# Patient Record
Sex: MALE | Race: WHITE | Employment: OTHER | URBAN - METROPOLITAN AREA
[De-identification: names, ages, dates, MRNs, and addresses within clinical notes are randomized per-mention and may not be internally consistent; named-entity substitution may affect disease eponyms.]

---

## 2017-01-18 ENCOUNTER — HOSPITAL ENCOUNTER (INPATIENT)
Age: 67
LOS: 1 days | Discharge: HOME | DRG: 287 | End: 2017-01-20
Attending: EMERGENCY MEDICINE | Admitting: INTERNAL MEDICINE
Payer: SELF-PAY

## 2017-01-18 ENCOUNTER — APPOINTMENT (OUTPATIENT)
Dept: GENERAL RADIOLOGY | Age: 67
DRG: 287 | End: 2017-01-18
Attending: EMERGENCY MEDICINE
Payer: SELF-PAY

## 2017-01-18 DIAGNOSIS — I50.9 CONGESTIVE HEART FAILURE, UNSPECIFIED CONGESTIVE HEART FAILURE CHRONICITY, UNSPECIFIED CONGESTIVE HEART FAILURE TYPE: Primary | ICD-10-CM

## 2017-01-18 LAB
NRBC # BLD: 0 K/UL (ref 0–0.01)
NRBC BLD-RTO: 0 PER 100 WBC

## 2017-01-18 PROCEDURE — 36415 COLL VENOUS BLD VENIPUNCTURE: CPT | Performed by: EMERGENCY MEDICINE

## 2017-01-18 PROCEDURE — 82550 ASSAY OF CK (CPK): CPT | Performed by: EMERGENCY MEDICINE

## 2017-01-18 PROCEDURE — 83880 ASSAY OF NATRIURETIC PEPTIDE: CPT | Performed by: EMERGENCY MEDICINE

## 2017-01-18 PROCEDURE — 80053 COMPREHEN METABOLIC PANEL: CPT | Performed by: EMERGENCY MEDICINE

## 2017-01-18 PROCEDURE — 51798 US URINE CAPACITY MEASURE: CPT

## 2017-01-18 PROCEDURE — 83036 HEMOGLOBIN GLYCOSYLATED A1C: CPT | Performed by: INTERNAL MEDICINE

## 2017-01-18 PROCEDURE — 93005 ELECTROCARDIOGRAM TRACING: CPT

## 2017-01-18 PROCEDURE — 83735 ASSAY OF MAGNESIUM: CPT | Performed by: EMERGENCY MEDICINE

## 2017-01-18 PROCEDURE — 84443 ASSAY THYROID STIM HORMONE: CPT | Performed by: INTERNAL MEDICINE

## 2017-01-18 PROCEDURE — 71020 XR CHEST PA LAT: CPT

## 2017-01-18 PROCEDURE — 99285 EMERGENCY DEPT VISIT HI MDM: CPT

## 2017-01-18 PROCEDURE — 85025 COMPLETE CBC W/AUTO DIFF WBC: CPT | Performed by: EMERGENCY MEDICINE

## 2017-01-18 NOTE — IP AVS SNAPSHOT
[address] [phone] Patient: Cassandra Robb. MRN: [id number] PVA:[id number] You are allergic to the following No active allergies Recent Documentation Height Weight BMI  
  
  
 1.765 m 81 kg 25.99 kg/m2 Emergency Contacts Name Discharge Info Relation Home Work Mobile Reji Members     [phone] About your hospitalization You were admitted on:  January 19, 2017 You last received care in the:  [facility] 3 PROG CARE TELE 1 You were discharged on:  January 20, 2017 Unit phone number:  [phone] Why you were hospitalized Your primary diagnosis was:  Acute Systolic (Congestive) Heart Failure (Hcc) Your diagnoses also included:  Bilateral Lower Extremity Edema, Shortness Of Breath, Acute Renal Failure (Arf) (Hcc), Hyperglycemia Due To Type 2 Diabetes Mellitus (Hcc), Venous Stasis Dermatitis Of Both Lower Extremities, Hypoxia, Pulmonary Edema, Sinus Tachycardia, Ischemic Cardiomyopathy Providers Seen During Your Hospitalizations Provider Role Specialty Primary office phone Maggy Faith MD Attending Provider Emergency Medicine [phone] Naveed Fall DO Attending Provider Emergency Medicine [phone] Dinesh Oswald DO Attending Provider Internal Medicine [phone] Patti Dallas MD Attending Provider Internal Medicine [phone] Your Primary Care Physician (PCP) Primary Care Physician Office Phone Office Fax NONE ** None ** ** None ** Follow-up Information Follow up With Details Comments Contact Info Shanique Estrada NP On 1/27/2017 9:30am [address] [facility] [address] [place] [address] Road [address] [phone] None   None ([phone]) Patient stated that they have no PCP Your Appointments  Friday January 27, 2017  9:30 AM EST  
ESTABLISHED PATIENT with Shanique Estrada NP  
 [facility] ([facility] SCHEDULING) [address] [id number] [facility]  
[phone] Current Discharge Medication List  
  
STOP taking these medications ADVIL 200 mg tablet Generic drug:  ibuprofen  
   
  
 hydroCHLOROthiazide 25 mg tablet Commonly known as:  HYDRODIURIL Discharge Instructions Learning About Coronary Artery Disease (CAD) What is coronary artery disease? Coronary artery disease (CAD) occurs when plaque builds up in the arteries that bring oxygen-rich blood to your heart. Plaque is a fatty substance made of cholesterol, calcium, and other substances in the blood. This process is called hardening of the arteries, or atherosclerosis. What happens when you have coronary artery disease? · Plaque may narrow the coronary arteries. Narrowed arteries cause poor blood flow. This can lead to angina symptoms such as chest pain or discomfort. If blood flow is completely blocked, you could have a heart attack. · You can slow CAD and reduce the risk of future problems by making changes in your lifestyle. These include quitting smoking and eating heart-healthy foods. · Treatments for CAD, along with changes in your lifestyle, can help you live a longer and healthier life. How can you prevent coronary artery disease? · Do not smoke. It may be the best thing you can do to prevent heart disease. If you need help quitting, talk to your doctor about stop-smoking programs and medicines. These can increase your chances of quitting for good. · Be active. Get at least 30 minutes of exercise on most days of the week. Walking is a good choice. You also may want to do other activities, such as running, swimming, cycling, or playing tennis or team sports. · Eat heart-healthy foods. Eat more fruits and vegetables and less foods that contain saturated and trans fats. Limit alcohol, sodium, and sweets. · Stay at a healthy weight. Lose weight if you need to. · Manage other health problems such as diabetes, high blood pressure, and high cholesterol. · Manage stress. Stress can hurt your heart. To keep stress low, talk about your problems and feelings. Don't keep your feelings hidden. · If you have talked about it with your doctor, take a low-dose aspirin every day. Aspirin can help certain people lower their risk of a heart attack or stroke. But taking aspirin isn't right for everyone, because it can cause serious bleeding. Do not start taking daily aspirin unless your doctor knows about it. How is coronary artery disease treated? · Your doctor will suggest that you make lifestyle changes. For example, your doctor may ask you to eat healthy foods, quit smoking, lose extra weight, and be more active. · You will have to take medicines. · Your doctor may suggest a procedure to open narrowed or blocked arteries. This is called angioplasty. Or your doctor may suggest using healthy blood vessels to create detours around narrowed or blocked arteries. This is called bypass surgery. Follow-up care is a key part of your treatment and safety. Be sure to make and go to all appointments, and call your doctor if you are having problems. It's also a good idea to know your test results and keep a list of the medicines you take. Where can you learn more? Go to http://erik-dianna.info/. Enter [phone] 6928 [id number] in the search box to learn more about \"Learning About Coronary Artery Disease (CAD). \" Current as of: January 27, 2016 Content Version: 11.1 © 9820-2808 [employer]. Care instructions adapted under license by [employer] (which disclaims liability or warranty for this information). If you have questions about a medical condition or this instruction, always ask your healthcare professional. [address] any warranty or liability for your use of this information. Avoiding Triggers With Heart Failure: Care Instructions Your Care Instructions Triggers are anything that make your heart failure flare up. A flare-up is also called \"sudden heart failure\" or \"acute heart failure. \" When you have a flare-up, fluid builds up in your lungs, and you have problems breathing. You might need to go to the hospital. By watching for changes in your condition and avoiding triggers, you can prevent heart failure flare-ups. Follow-up care is a key part of your treatment and safety. Be sure to make and go to all appointments, and call your doctor if you are having problems. It's also a good idea to know your test results and keep a list of the medicines you take. How can you care for yourself at home? Watch for changes in your weight and condition · Weigh yourself without clothing at the same time each day. Record your weight. Call your doctor if you gain 3 pounds or more in 2 to 3 days. A sudden weight gain may mean that your heart failure is getting worse. · Keep a daily record of your symptoms. Write down any changes in how you feel, such as new shortness of breath, cough, or problems eating. Also record if your ankles are more swollen than usual and if you have to urinate in the night more often. Note anything that you ate or did that could have triggered these changes. Limit sodium Sodium causes your body to hold on to water, making it harder for your heart to pump. People get most of their sodium from processed foods. Fast food and restaurant meals also tend to be very high in sodium. · Your doctor may suggest that you limit sodium to 2,000 milligrams (mg) a day or less. That is less than 1 teaspoon of salt a day, including all the salt you eat in cooking or in packaged foods. · Read food labels on cans and food packages. They tell you how much sodium you get in one serving. Check the serving size. If you eat more than one serving, you are getting more sodium. · Be aware that sodium can come in forms other than salt, including monosodium glutamate (MSG), sodium citrate, and sodium bicarbonate (baking soda). MSG is often added to Asian food. You can sometimes ask for food without MSG or salt. · Slowly reducing salt will help you adjust to the taste. Take the salt shaker off the table. · Flavor your food with garlic, lemon juice, onion, vinegar, herbs, and spices instead of salt. Do not use soy sauce, steak sauce, onion salt, garlic salt, mustard, or ketchup on your food, unless it is labeled \"low-sodium\" or \"low-salt. \" 
· Make your own salad dressings, sauces, and ketchup without adding salt. · Use fresh or frozen ingredients, instead of canned ones, whenever you can. Choose low-sodium canned goods. · Eat less processed food and food from restaurants, including fast food. Exercise as directed Moderate, regular exercise is very good for your heart. It improves your blood flow and helps control your weight. But too much exercise can stress your heart and cause a heart failure flare-up. · Check with your doctor before you start an exercise program. 
· Walking is an easy way to get exercise. Start out slowly. Gradually increase the length and pace of your walk. Swimming, riding a bike, and using a treadmill are also good forms of exercise. · When you exercise, watch for signs that your heart is working too hard. You are pushing yourself too hard if you cannot talk while you are exercising. If you become short of breath or dizzy or have chest pain, stop, sit down, and rest. 
· Do not exercise when you do not feel well. Take medicines correctly · Take your medicines exactly as prescribed. Call your doctor if you think you are having a problem with your medicine. · Make a list of all the medicines you take.  Include those prescribed to you by other doctors and any over-the-counter medicines, vitamins, or supplements you take. Take this list with you when you go to any doctor. · Take your medicines at the same time every day. It may help you to post a list of all the medicines you take every day and what time of day you take them. · Make taking your medicine as simple as you can. Plan times to take your medicines when you are doing other things, such as eating a meal or getting ready for bed. This will make it easier to remember to take your medicines. · Get organized. Use helpful tools, such as daily or weekly pill containers. When should you call for help? Call 911 if you have symptoms of sudden heart failure such as: 
· You have severe trouble breathing. · You cough up pink, foamy mucus. · You have a new irregular or rapid heartbeat. Call your doctor now or seek immediate medical care if: 
· You have new or increased shortness of breath. · You are dizzy or lightheaded, or you feel like you may faint. · You have sudden weight gain, such as 3 pounds or more in 2 to 3 days. · You have increased swelling in your legs, ankles, or feet. · You are suddenly so tired or weak that you cannot do your usual activities. Watch closely for changes in your health, and be sure to contact your doctor if you develop new symptoms. Where can you learn more? Go to http://erik-dianna.info/. Enter M075 in the search box to learn more about \"Avoiding Triggers With Heart Failure: Care Instructions. \" Current as of: April 27, 2016 Content Version: 11.1 © 8331-4388 [employer]. Care instructions adapted under license by [employer] (which disclaims liability or warranty for this information). If you have questions about a medical condition or this instruction, always ask your healthcare professional. [place] [address] any warranty or liability for your use of this information.  
Cardiology Follow up with Davida Henriquez NP on January 27th, 2017 at 9:30am. 
 [address] [facility] [address] 
[phone] Cardiac Catheterization/Angiography Discharge Instructions *Check the puncture site frequently for swelling or bleeding. If you see any bleeding, lie down and apply pressure over the area with a clean town or washcloth. Notify your doctor for any redness, swelling, drainage or oozing from the puncture site. Notify your doctor for any fever or chills. *If the leg or arm with the puncture becomes cold, numb or painful, call Dr Sania Pabon at  [facility] in [place] *Activity should be limited for the next 48 hours. Climb stairs as little as possible and avoid any stooping, bending or strenuous activity for 48 hours. No heavy lifting (anything over 10 pounds) for three days. *Do not drive for 48 hours. *You may resume your usual diet. Drink more fluids than usual. 
 
*Have a responsible person drive you home and stay with you for at least 24 hours after your heart catheterization/angiography. *You may remove the bandage from your {ARM/GROIN:74024} in 24 hours. You may shower in 24 hours. No tub baths, hot tubs or swimming for one week. Do not place any lotions, creams, powders, ointments over the puncture site for one week. You may place a clean band-aid over the puncture site each day for 5 days. Change this daily. Discharge Orders None [employer] Announcement We are excited to announce that we are making your provider's discharge notes available to you in AdYouNet. You will see these notes when they are completed and signed by the physician that discharged you from your recent hospital stay. If you have any questions or concerns about any information you see in AdYouNet, please call the Health Information Department where you were seen or reach out to your Primary Care Provider for more information about your plan of care. Introducing [facility]! Sindy Starks introduces MightyText patient portal. Now you can access parts of your medical record, email your doctor's office, and request medication refills online. 1. In your internet browser, go to [email or web address] 2. Click on the First Time User? Click Here link in the Sign In box. You will see the New Member Sign Up page. 3. Enter your MightyText Access Code exactly as it appears below. You will not need to use this code after youve completed the sign-up process. If you do not sign up before the expiration date, you must request a new code. · MightyText Access Code: [email or web address] Expires: 4/18/2017 10:53 PM 
 
4. Enter the last four digits of your Social Security Number (xxxx) and Date of Birth (mm/dd/yyyy) as indicated and click Submit. You will be taken to the next sign-up page. 5. Create a MightyText ID. This will be your MightyText login ID and cannot be changed, so think of one that is secure and easy to remember. 6. Create a MightyText password. You can change your password at any time. 7. Enter your Password Reset Question and Answer. This can be used at a later time if you forget your password. 8. Enter your e-mail address. You will receive e-mail notification when new information is available in [address] 9. Click Sign Up. You can now view and download portions of your medical record. 10. Click the Download Summary menu link to download a portable copy of your medical information. If you have questions, please visit the Frequently Asked Questions section of the MightyText website. Remember, MightyText is NOT to be used for urgent needs. For medical emergencies, dial 911. Now available from your iPhone and Android! General Information Please provide this summary of care documentation to your next provider. Patient Signature:  ____________________________________________________________ Date:  ____________________________________________________________  
  
Ashlie Audrey Provider Signature:  ____________________________________________________________ Date:  ____________________________________________________________

## 2017-01-19 ENCOUNTER — APPOINTMENT (OUTPATIENT)
Dept: ULTRASOUND IMAGING | Age: 67
DRG: 287 | End: 2017-01-19
Attending: INTERNAL MEDICINE
Payer: SELF-PAY

## 2017-01-19 ENCOUNTER — APPOINTMENT (OUTPATIENT)
Dept: CT IMAGING | Age: 67
DRG: 287 | End: 2017-01-19
Attending: INTERNAL MEDICINE
Payer: SELF-PAY

## 2017-01-19 PROBLEM — N17.9 ACUTE RENAL FAILURE (ARF) (HCC): Status: ACTIVE | Noted: 2017-01-19

## 2017-01-19 PROBLEM — J81.1 PULMONARY EDEMA: Status: ACTIVE | Noted: 2017-01-19

## 2017-01-19 PROBLEM — R00.0 SINUS TACHYCARDIA: Status: ACTIVE | Noted: 2017-01-19

## 2017-01-19 PROBLEM — I87.2 VENOUS STASIS DERMATITIS OF BOTH LOWER EXTREMITIES: Status: ACTIVE | Noted: 2017-01-19

## 2017-01-19 PROBLEM — R09.02 HYPOXIA: Status: ACTIVE | Noted: 2017-01-19

## 2017-01-19 PROBLEM — R60.0 BILATERAL LOWER EXTREMITY EDEMA: Status: ACTIVE | Noted: 2017-01-19

## 2017-01-19 PROBLEM — E11.65 HYPERGLYCEMIA DUE TO TYPE 2 DIABETES MELLITUS (HCC): Status: ACTIVE | Noted: 2017-01-19

## 2017-01-19 PROBLEM — I50.21 ACUTE SYSTOLIC (CONGESTIVE) HEART FAILURE (HCC): Status: ACTIVE | Noted: 2017-01-19

## 2017-01-19 PROBLEM — R06.02 SHORTNESS OF BREATH: Status: ACTIVE | Noted: 2017-01-19

## 2017-01-19 PROBLEM — I50.9 CONGESTIVE HEART FAILURE (HCC): Status: ACTIVE | Noted: 2017-01-19

## 2017-01-19 LAB
ALBUMIN SERPL BCP-MCNC: 2.9 G/DL (ref 3.5–5)
ALBUMIN/GLOB SERPL: 0.7 {RATIO} (ref 1.1–2.2)
ALP SERPL-CCNC: 83 U/L (ref 45–117)
ALT SERPL-CCNC: 13 U/L (ref 12–78)
ANION GAP BLD CALC-SCNC: 11 MMOL/L (ref 5–15)
APPEARANCE UR: CLEAR
AST SERPL W P-5'-P-CCNC: 10 U/L (ref 15–37)
ATRIAL RATE: 108 BPM
BACTERIA URNS QL MICRO: ABNORMAL /HPF
BASOPHILS # BLD AUTO: 0 K/UL (ref 0–0.1)
BASOPHILS # BLD: 0 % (ref 0–1)
BILIRUB SERPL-MCNC: 0.9 MG/DL (ref 0.2–1)
BILIRUB UR QL CFM: NEGATIVE
BNP SERPL-MCNC: 9250 PG/ML (ref 0–125)
BUN SERPL-MCNC: 40 MG/DL (ref 6–20)
BUN/CREAT SERPL: 17 (ref 12–20)
CALCIUM SERPL-MCNC: 8.8 MG/DL (ref 8.5–10.1)
CALCULATED P AXIS, ECG09: 56 DEGREES
CALCULATED R AXIS, ECG10: 23 DEGREES
CALCULATED T AXIS, ECG11: 124 DEGREES
CHLORIDE SERPL-SCNC: 90 MMOL/L (ref 97–108)
CHOLEST SERPL-MCNC: 120 MG/DL
CK MB CFR SERPL CALC: 5.7 % (ref 0–2.5)
CK MB SERPL-MCNC: 2.9 NG/ML (ref 5–25)
CK SERPL-CCNC: 51 U/L (ref 39–308)
CO2 SERPL-SCNC: 28 MMOL/L (ref 21–32)
COLOR UR: ABNORMAL
CREAT SERPL-MCNC: 2.33 MG/DL (ref 0.7–1.3)
CREAT UR-MCNC: 190.36 MG/DL
DIAGNOSIS, 93000: NORMAL
DIFFERENTIAL METHOD BLD: ABNORMAL
EOSINOPHIL # BLD: 0.1 K/UL (ref 0–0.4)
EOSINOPHIL NFR BLD: 1 % (ref 0–7)
EPITH CASTS URNS QL MICRO: ABNORMAL /LPF
ERYTHROCYTE [DISTWIDTH] IN BLOOD BY AUTOMATED COUNT: 15.7 % (ref 11.5–14.5)
EST. AVERAGE GLUCOSE BLD GHB EST-MCNC: 338 MG/DL
EST. AVERAGE GLUCOSE BLD GHB EST-MCNC: 341 MG/DL
GLOBULIN SER CALC-MCNC: 4 G/DL (ref 2–4)
GLUCOSE BLD STRIP.AUTO-MCNC: 127 MG/DL (ref 65–100)
GLUCOSE BLD STRIP.AUTO-MCNC: 182 MG/DL (ref 65–100)
GLUCOSE BLD STRIP.AUTO-MCNC: 200 MG/DL (ref 65–100)
GLUCOSE BLD STRIP.AUTO-MCNC: 423 MG/DL (ref 65–100)
GLUCOSE SERPL-MCNC: 491 MG/DL (ref 65–100)
GLUCOSE UR STRIP.AUTO-MCNC: >1000 MG/DL
HBA1C MFR BLD: 13.4 % (ref 4.2–6.3)
HBA1C MFR BLD: 13.5 % (ref 4.2–6.3)
HCT VFR BLD AUTO: 40.8 % (ref 36.6–50.3)
HDLC SERPL-MCNC: 19 MG/DL
HDLC SERPL: 6.3 {RATIO} (ref 0–5)
HGB BLD-MCNC: 13.6 G/DL (ref 12.1–17)
HGB UR QL STRIP: NEGATIVE
HYALINE CASTS URNS QL MICRO: ABNORMAL /LPF (ref 0–5)
KETONES UR QL STRIP.AUTO: NEGATIVE MG/DL
LDLC SERPL CALC-MCNC: 76 MG/DL (ref 0–100)
LEUKOCYTE ESTERASE UR QL STRIP.AUTO: NEGATIVE
LIPID PROFILE,FLP: ABNORMAL
LYMPHOCYTES # BLD AUTO: 7 % (ref 12–49)
LYMPHOCYTES # BLD: 0.6 K/UL (ref 0.8–3.5)
MAGNESIUM SERPL-MCNC: 1.6 MG/DL (ref 1.6–2.4)
MCH RBC QN AUTO: 25.6 PG (ref 26–34)
MCHC RBC AUTO-ENTMCNC: 33.3 G/DL (ref 30–36.5)
MCV RBC AUTO: 76.7 FL (ref 80–99)
MONOCYTES # BLD: 0.9 K/UL (ref 0–1)
MONOCYTES NFR BLD AUTO: 10 % (ref 5–13)
NEUTS SEG # BLD: 6.9 K/UL (ref 1.8–8)
NEUTS SEG NFR BLD AUTO: 82 % (ref 32–75)
NITRITE UR QL STRIP.AUTO: NEGATIVE
P-R INTERVAL, ECG05: 158 MS
PH UR STRIP: 5 [PH] (ref 5–8)
PLATELET # BLD AUTO: 254 K/UL (ref 150–400)
POTASSIUM SERPL-SCNC: 3.5 MMOL/L (ref 3.5–5.1)
PROT SERPL-MCNC: 6.9 G/DL (ref 6.4–8.2)
PROT UR STRIP-MCNC: 100 MG/DL
PROT UR-MCNC: 153 MG/DL (ref 0–11.9)
PROT/CREAT UR-RTO: 0.8
Q-T INTERVAL, ECG07: 374 MS
QRS DURATION, ECG06: 130 MS
QTC CALCULATION (BEZET), ECG08: 501 MS
RBC # BLD AUTO: 5.32 M/UL (ref 4.1–5.7)
RBC #/AREA URNS HPF: ABNORMAL /HPF (ref 0–5)
RBC MORPH BLD: ABNORMAL
SERVICE CMNT-IMP: ABNORMAL
SODIUM SERPL-SCNC: 129 MMOL/L (ref 136–145)
SODIUM UR-SCNC: 14 MMOL/L
SP GR UR REFRACTOMETRY: 1.03 (ref 1–1.03)
TRIGL SERPL-MCNC: 125 MG/DL (ref ?–150)
TROPONIN I SERPL-MCNC: 0.04 NG/ML
TROPONIN I SERPL-MCNC: <0.04 NG/ML
TROPONIN I SERPL-MCNC: <0.04 NG/ML
TSH SERPL DL<=0.05 MIU/L-ACNC: 1.49 UIU/ML (ref 0.36–3.74)
UA: UC IF INDICATED,UAUC: ABNORMAL
UROBILINOGEN UR QL STRIP.AUTO: 1 EU/DL (ref 0.2–1)
VENTRICULAR RATE, ECG03: 108 BPM
VLDLC SERPL CALC-MCNC: 25 MG/DL
WBC # BLD AUTO: 8.5 K/UL (ref 4.1–11.1)
WBC URNS QL MICRO: ABNORMAL /HPF (ref 0–4)

## 2017-01-19 PROCEDURE — 80061 LIPID PANEL: CPT | Performed by: INTERNAL MEDICINE

## 2017-01-19 PROCEDURE — 81001 URINALYSIS AUTO W/SCOPE: CPT | Performed by: EMERGENCY MEDICINE

## 2017-01-19 PROCEDURE — 74011250637 HC RX REV CODE- 250/637: Performed by: INTERNAL MEDICINE

## 2017-01-19 PROCEDURE — 74011250636 HC RX REV CODE- 250/636: Performed by: INTERNAL MEDICINE

## 2017-01-19 PROCEDURE — 74011250636 HC RX REV CODE- 250/636: Performed by: EMERGENCY MEDICINE

## 2017-01-19 PROCEDURE — 84156 ASSAY OF PROTEIN URINE: CPT | Performed by: INTERNAL MEDICINE

## 2017-01-19 PROCEDURE — 74011636637 HC RX REV CODE- 636/637: Performed by: INTERNAL MEDICINE

## 2017-01-19 PROCEDURE — C8923 2D TTE W OR W/O FOL W/CON,CO: HCPCS

## 2017-01-19 PROCEDURE — 87086 URINE CULTURE/COLONY COUNT: CPT | Performed by: EMERGENCY MEDICINE

## 2017-01-19 PROCEDURE — 74011000250 HC RX REV CODE- 250: Performed by: INTERNAL MEDICINE

## 2017-01-19 PROCEDURE — 65660000000 HC RM CCU STEPDOWN

## 2017-01-19 PROCEDURE — 82962 GLUCOSE BLOOD TEST: CPT

## 2017-01-19 PROCEDURE — 74011250636 HC RX REV CODE- 250/636: Performed by: SPECIALIST

## 2017-01-19 PROCEDURE — 71250 CT THORAX DX C-: CPT

## 2017-01-19 PROCEDURE — 84484 ASSAY OF TROPONIN QUANT: CPT | Performed by: INTERNAL MEDICINE

## 2017-01-19 PROCEDURE — 76770 US EXAM ABDO BACK WALL COMP: CPT

## 2017-01-19 PROCEDURE — 83036 HEMOGLOBIN GLYCOSYLATED A1C: CPT | Performed by: INTERNAL MEDICINE

## 2017-01-19 PROCEDURE — 36415 COLL VENOUS BLD VENIPUNCTURE: CPT | Performed by: INTERNAL MEDICINE

## 2017-01-19 PROCEDURE — 84300 ASSAY OF URINE SODIUM: CPT | Performed by: INTERNAL MEDICINE

## 2017-01-19 RX ORDER — ONDANSETRON 2 MG/ML
6 INJECTION INTRAMUSCULAR; INTRAVENOUS
Status: DISCONTINUED | OUTPATIENT
Start: 2017-01-19 | End: 2017-01-20 | Stop reason: HOSPADM

## 2017-01-19 RX ORDER — BUMETANIDE 0.25 MG/ML
1 INJECTION INTRAMUSCULAR; INTRAVENOUS 2 TIMES DAILY
Status: DISCONTINUED | OUTPATIENT
Start: 2017-01-19 | End: 2017-01-20 | Stop reason: HOSPADM

## 2017-01-19 RX ORDER — IBUPROFEN 200 MG
400 TABLET ORAL 2 TIMES DAILY
COMMUNITY
End: 2017-01-20

## 2017-01-19 RX ORDER — TRAMADOL HYDROCHLORIDE 50 MG/1
50 TABLET ORAL
Status: DISCONTINUED | OUTPATIENT
Start: 2017-01-19 | End: 2017-01-20 | Stop reason: HOSPADM

## 2017-01-19 RX ORDER — HYDROMORPHONE HYDROCHLORIDE 1 MG/ML
1 INJECTION, SOLUTION INTRAMUSCULAR; INTRAVENOUS; SUBCUTANEOUS
Status: DISCONTINUED | OUTPATIENT
Start: 2017-01-19 | End: 2017-01-20 | Stop reason: HOSPADM

## 2017-01-19 RX ORDER — ACETAMINOPHEN 325 MG/1
650 TABLET ORAL
Status: DISCONTINUED | OUTPATIENT
Start: 2017-01-19 | End: 2017-01-20 | Stop reason: HOSPADM

## 2017-01-19 RX ORDER — SODIUM CHLORIDE 0.9 % (FLUSH) 0.9 %
5-10 SYRINGE (ML) INJECTION AS NEEDED
Status: CANCELLED | OUTPATIENT
Start: 2017-01-19

## 2017-01-19 RX ORDER — MAGNESIUM SULFATE 100 %
4 CRYSTALS MISCELLANEOUS AS NEEDED
Status: DISCONTINUED | OUTPATIENT
Start: 2017-01-19 | End: 2017-01-20 | Stop reason: HOSPADM

## 2017-01-19 RX ORDER — MORPHINE SULFATE 2 MG/ML
2 INJECTION, SOLUTION INTRAMUSCULAR; INTRAVENOUS
Status: DISCONTINUED | OUTPATIENT
Start: 2017-01-19 | End: 2017-01-19

## 2017-01-19 RX ORDER — HYDROCHLOROTHIAZIDE 25 MG/1
25 TABLET ORAL DAILY
COMMUNITY
End: 2017-01-20

## 2017-01-19 RX ORDER — HYDROMORPHONE HYDROCHLORIDE 1 MG/ML
2 INJECTION, SOLUTION INTRAMUSCULAR; INTRAVENOUS; SUBCUTANEOUS
Status: DISCONTINUED | OUTPATIENT
Start: 2017-01-19 | End: 2017-01-20 | Stop reason: HOSPADM

## 2017-01-19 RX ORDER — DIPHENHYDRAMINE HCL 25 MG
25 CAPSULE ORAL
Status: DISCONTINUED | OUTPATIENT
Start: 2017-01-19 | End: 2017-01-20 | Stop reason: HOSPADM

## 2017-01-19 RX ORDER — CARVEDILOL 3.12 MG/1
3.12 TABLET ORAL 2 TIMES DAILY WITH MEALS
Status: DISCONTINUED | OUTPATIENT
Start: 2017-01-19 | End: 2017-01-20 | Stop reason: HOSPADM

## 2017-01-19 RX ORDER — INSULIN LISPRO 100 [IU]/ML
INJECTION, SOLUTION INTRAVENOUS; SUBCUTANEOUS
Status: DISCONTINUED | OUTPATIENT
Start: 2017-01-19 | End: 2017-01-20 | Stop reason: HOSPADM

## 2017-01-19 RX ORDER — ASPIRIN 81 MG/1
81 TABLET ORAL DAILY
Status: DISCONTINUED | OUTPATIENT
Start: 2017-01-19 | End: 2017-01-20 | Stop reason: HOSPADM

## 2017-01-19 RX ORDER — IBUPROFEN 200 MG
1 TABLET ORAL DAILY
Status: DISCONTINUED | OUTPATIENT
Start: 2017-01-19 | End: 2017-01-20 | Stop reason: HOSPADM

## 2017-01-19 RX ORDER — SODIUM CHLORIDE 0.9 % (FLUSH) 0.9 %
5-10 SYRINGE (ML) INJECTION EVERY 8 HOURS
Status: CANCELLED | OUTPATIENT
Start: 2017-01-19

## 2017-01-19 RX ORDER — HYDROMORPHONE HYDROCHLORIDE 2 MG/ML
1-2 INJECTION, SOLUTION INTRAMUSCULAR; INTRAVENOUS; SUBCUTANEOUS
Status: DISCONTINUED | OUTPATIENT
Start: 2017-01-19 | End: 2017-01-19

## 2017-01-19 RX ORDER — DEXTROSE 50 % IN WATER (D50W) INTRAVENOUS SYRINGE
12.5-25 AS NEEDED
Status: DISCONTINUED | OUTPATIENT
Start: 2017-01-19 | End: 2017-01-20 | Stop reason: HOSPADM

## 2017-01-19 RX ORDER — FUROSEMIDE 10 MG/ML
80 INJECTION INTRAMUSCULAR; INTRAVENOUS
Status: COMPLETED | OUTPATIENT
Start: 2017-01-19 | End: 2017-01-19

## 2017-01-19 RX ORDER — INSULIN GLARGINE 100 [IU]/ML
10 INJECTION, SOLUTION SUBCUTANEOUS DAILY
Status: DISCONTINUED | OUTPATIENT
Start: 2017-01-19 | End: 2017-01-20 | Stop reason: HOSPADM

## 2017-01-19 RX ORDER — HEPARIN SODIUM 5000 [USP'U]/ML
5000 INJECTION, SOLUTION INTRAVENOUS; SUBCUTANEOUS EVERY 8 HOURS
Status: DISCONTINUED | OUTPATIENT
Start: 2017-01-19 | End: 2017-01-20 | Stop reason: HOSPADM

## 2017-01-19 RX ADMIN — HEPARIN SODIUM 5000 UNITS: 5000 INJECTION, SOLUTION INTRAVENOUS; SUBCUTANEOUS at 08:41

## 2017-01-19 RX ADMIN — BUMETANIDE 1 MG: 0.25 INJECTION, SOLUTION INTRAMUSCULAR; INTRAVENOUS at 09:53

## 2017-01-19 RX ADMIN — ASPIRIN 81 MG: 81 TABLET, COATED ORAL at 09:55

## 2017-01-19 RX ADMIN — PERFLUTREN 2 ML: 6.52 INJECTION, SUSPENSION INTRAVENOUS at 09:22

## 2017-01-19 RX ADMIN — TRAMADOL HYDROCHLORIDE 50 MG: 50 TABLET, FILM COATED ORAL at 18:56

## 2017-01-19 RX ADMIN — CARVEDILOL 3.12 MG: 3.12 TABLET, FILM COATED ORAL at 09:55

## 2017-01-19 RX ADMIN — Medication 2 MG: at 06:29

## 2017-01-19 RX ADMIN — FUROSEMIDE 80 MG: 10 INJECTION, SOLUTION INTRAMUSCULAR; INTRAVENOUS at 01:40

## 2017-01-19 RX ADMIN — INSULIN LISPRO 12 UNITS: 100 INJECTION, SOLUTION INTRAVENOUS; SUBCUTANEOUS at 08:40

## 2017-01-19 RX ADMIN — Medication 2 MG: at 09:49

## 2017-01-19 RX ADMIN — ACETAMINOPHEN 650 MG: 325 TABLET ORAL at 10:45

## 2017-01-19 RX ADMIN — HYDROMORPHONE HYDROCHLORIDE 1 MG: 1 INJECTION, SOLUTION INTRAMUSCULAR; INTRAVENOUS; SUBCUTANEOUS at 14:25

## 2017-01-19 RX ADMIN — INSULIN GLARGINE 10 UNITS: 100 INJECTION, SOLUTION SUBCUTANEOUS at 10:42

## 2017-01-19 RX ADMIN — CARVEDILOL 3.12 MG: 3.12 TABLET, FILM COATED ORAL at 18:56

## 2017-01-19 RX ADMIN — HYDROMORPHONE HYDROCHLORIDE 1 MG: 1 INJECTION, SOLUTION INTRAMUSCULAR; INTRAVENOUS; SUBCUTANEOUS at 10:59

## 2017-01-19 RX ADMIN — INSULIN LISPRO 2 UNITS: 100 INJECTION, SOLUTION INTRAVENOUS; SUBCUTANEOUS at 12:05

## 2017-01-19 RX ADMIN — HEPARIN SODIUM 5000 UNITS: 5000 INJECTION, SOLUTION INTRAVENOUS; SUBCUTANEOUS at 16:21

## 2017-01-19 NOTE — PROGRESS NOTES
Physical Therapy:  Orders received and chart reviewed. Patient currently with 10/10 pain, patient receiving additional IV pain medication from nurse. He declined out of bed or participation with PT/OT due to pain. Agreed to participate once his pain we better managed. We will continue to follow and re-attempt later as able.   Thank you  Nahed Oglesby PT,DPT

## 2017-01-19 NOTE — CARDIO/PULMONARY
Cardiac Rehab: 62 yo male admitted with SOB (1/19). Per Dr Rachid Carlson, dx includes: acute CHF, ARF & new DM2. S/P cath with severe 3VD, per Dr Familia Moore (1/20). LVEF 5-10% by echo (1/19/17). 1/19/2017 Received consult for CHF teaching on pt in ED; awaiting bed assignment. Also consult for smoking cessation counseling. Info attached to AVS on CHF & smoking cessation. 1/20/2017 Pt off floor for cath. Plan for CTS consult, per Dr Familia Moore.

## 2017-01-19 NOTE — ED NOTES
11:34 PM  Change of shift. Care of patient taken over from Dr. Lalo Melvin by Dr. Ruma العلي; H&P reviewed, handoff complete. Awaiting admission. 12:20 AM - d/w Dr Corina Peacock - he will see pt.

## 2017-01-19 NOTE — CONSULTS
[address] Miguel Willis. YOB: 1950     Assessment & Plan:   1. Elevated CR: CHACORTA Vs CKD  · No prior cr  · Last MD visit > 3 yrs ago  · Might be CKD due to daily NSAID use and DM  · US: No hydro, ok size kidneys  · UA: Glycosuria and urine prt/cr < 1 gm  · ACE if cr remains stable  · Needs CKD visit  · No NSAIDs  · DM control dw pt  2. Hyponatremia with hyperglycemia  · Corrected : mild hyponatremia  · Edema: so hypervolemic hyponatremia  · Monitor    3. DM  · Not managed and treated    4. Ex smoker    5. Edema with low albumin, Pleural effusion  · Urine prt/ct 0.8  · Use loops   · CHF likley: Echo          Subjective:   CHIEF COMPLAIN: edema  HPI:  Mr. Mal Lawler is a 61 y.o.  male with no significant medical history and general avoidance of the healthcare system who is admitted with new onset congestive heart failure, acute renal failure and  diabetes mellitus type II. He has not seen MD for > 3 Yrs. He was admitted at [address] [place] [address] but no records. He takes NSAIDs daily. He denies CKD. No FH CKD. He was told to have Hep C but ? They can not find it:? May be neg serologies. He presented to the Emergency Department today complaining of a few weeks of worsening lower extremity swelling. He reports that the last time he sought medical care was for leg swelling and SOB over 3 yrs ago at [address] [place] [address] but cannot recall anything. He reports worsening NAVARRO/SOB/PND/4 pillow orthopnea that has waxed and waned for those 3 years but progressively worsened over 3 weeks. Also notes sternal chest pressure, radiating to back and arms. Happens in AM. Nothing makes better or worse. Cannot be provoked. Also notes worsening LE edema with weeping. We will admit for further work-up and management. Review of Systems  A comprehensive review of systems was negative except for that written in the HPI. History reviewed. No pertinent past medical history. History reviewed. No pertinent past surgical history. Social History     Social History    Marital status:      Spouse name: N/A    Number of children: N/A    Years of education: N/A     Occupational History    Not on file. Social History Main Topics    Smoking status: Not on file    Smokeless tobacco: Not on file    Alcohol use Not on file    Drug use: Not on file    Sexual activity: Not on file     Other Topics Concern    Not on file     Social History Narrative    No narrative on file      No family history on file. Prior to Admission medications    Medication Sig Start Date End Date Taking? Authorizing Provider   ibuprofen (ADVIL) 200 mg tablet Take 400 mg by mouth two (2) times a day. Yes Historical Provider   hydroCHLOROthiazide (HYDRODIURIL) 25 mg tablet Take 25 mg by mouth daily. Yes Historical Provider     No Known Allergies    Objective:     Vitals:  Blood pressure 110/69, pulse (!) 105, temperature 98.3 °F (36.8 °C), resp. rate 19, height 5' 9.5\" (1.765 m), weight 77.1 kg (170 lb), SpO2 (!) 86 %.   Temp (24hrs), Av.1 °F (36.7 °C), Min:97.8 °F (36.6 °C), Max:98.3 °F (36.8 °C)      Intake and Output:   0701 -  1900  In: -   Out: 80 [Urine:410]       Physical Exam:                Patient is intubated:  no    Physical Examination:   GENERAL ASSESSMENT: cachetic  SKIN: SKIN LESION  HEAD: normocephalic  EYES: normal eyes  NECK: normal  CHEST: normal air exchange, no rales, no rhonchi, no wheezes, respiratory effort normal with no retractions  HEART: regular rate and rhythm, normal S1/S2  ABDOMEN: soft, non-distended, no masses  :Gordillo: no  EXTREMITY: no joint swelling, 2 EDEMA  NEURO: gross motor exam normal by observation      ECG/rhythm[de-identified] Rev:yes  Xray/CT/US/MRI REV:yes  Data Review   Recent Results (from the past 72 hour(s))   EKG, 12 LEAD, INITIAL    Collection Time: 17 11:07 PM   Result Value Ref Range    Ventricular Rate 108 BPM    Atrial Rate 108 BPM    P-R Interval 158 ms    QRS Duration 130 ms    Q-T Interval 374 ms    QTC Calculation (Bezet) 501 ms    Calculated P Axis 56 degrees    Calculated R Axis 23 degrees    Calculated T Axis 124 degrees    Diagnosis       Sinus tachycardia with occasional premature ventricular complexes  Possible Left atrial enlargement  Nonspecific intraventricular block  Abnormal ECG  Confirmed by Doc Aguilar MD ([id number]) on 1/19/2017 9:39:27 AM     CBC WITH AUTOMATED DIFF    Collection Time: 01/18/17 11:11 PM   Result Value Ref Range    WBC 8.5 4.1 - 11.1 K/uL    RBC 5.32 4.10 - 5.70 M/uL    HGB 13.6 12.1 - 17.0 g/dL    HCT 40.8 36.6 - 50.3 %    MCV 76.7 (L) 80.0 - 99.0 FL    MCH 25.6 (L) 26.0 - 34.0 PG    MCHC 33.3 30.0 - 36.5 g/dL    RDW 15.7 (H) 11.5 - 14.5 %    PLATELET 182 701 - 243 K/uL    NEUTROPHILS 82 (H) 32 - 75 %    LYMPHOCYTES 7 (L) 12 - 49 %    MONOCYTES 10 5 - 13 %    EOSINOPHILS 1 0 - 7 %    BASOPHILS 0 0 - 1 %    ABS. NEUTROPHILS 6.9 1.8 - 8.0 K/UL    ABS. LYMPHOCYTES 0.6 (L) 0.8 - 3.5 K/UL    ABS. MONOCYTES 0.9 0.0 - 1.0 K/UL    ABS. EOSINOPHILS 0.1 0.0 - 0.4 K/UL    ABS. BASOPHILS 0.0 0.0 - 0.1 K/UL    DF SMEAR SCANNED      RBC COMMENTS ANISOCYTOSIS  1+       METABOLIC PANEL, COMPREHENSIVE    Collection Time: 01/18/17 11:11 PM   Result Value Ref Range    Sodium 129 (L) 136 - 145 mmol/L    Potassium 3.5 3.5 - 5.1 mmol/L    Chloride 90 (L) 97 - 108 mmol/L    CO2 28 21 - 32 mmol/L    Anion gap 11 5 - 15 mmol/L    Glucose 491 (H) 65 - 100 mg/dL    BUN 40 (H) 6 - 20 MG/DL    Creatinine 2.33 (H) 0.70 - 1.30 MG/DL    BUN/Creatinine ratio 17 12 - 20      GFR est AA 34 (L) >60 ml/min/1.73m2    GFR est non-AA 28 (L) >60 ml/min/1.73m2    Calcium 8.8 8.5 - 10.1 MG/DL    Bilirubin, total 0.9 0.2 - 1.0 MG/DL    ALT 13 12 - 78 U/L    AST 10 (L) 15 - 37 U/L    Alk.  phosphatase 83 45 - 117 U/L    Protein, total 6.9 6.4 - 8.2 g/dL    Albumin 2.9 (L) 3.5 - 5.0 g/dL    Globulin 4.0 2.0 - 4.0 g/dL    A-G Ratio 0.7 (L) 1.1 - 2.2     CK W/ CKMB & INDEX Collection Time: 01/18/17 11:11 PM   Result Value Ref Range    CK 51 39 - 308 U/L    CK - MB 2.9 <3.6 NG/ML    CK-MB Index 5.7 (H) 0 - 2.5     TROPONIN I    Collection Time: 01/18/17 11:11 PM   Result Value Ref Range    Troponin-I, Qt. <0.04 <0.05 ng/mL   PRO-BNP    Collection Time: 01/18/17 11:11 PM   Result Value Ref Range    NT pro-BNP 9250 (H) 0 - 125 PG/ML   MAGNESIUM    Collection Time: 01/18/17 11:11 PM   Result Value Ref Range    Magnesium 1.6 1.6 - 2.4 mg/dL   NUCLEATED RBC    Collection Time: 01/18/17 11:11 PM   Result Value Ref Range    NRBC 0.0 0  WBC    ABSOLUTE NRBC 0.00 0.00 - 0.01 K/uL   HEMOGLOBIN A1C WITH EAG    Collection Time: 01/18/17 11:11 PM   Result Value Ref Range    Hemoglobin A1c 13.4 (H) 4.2 - 6.3 %    Est. average glucose 338 mg/dL   TSH 3RD GENERATION    Collection Time: 01/18/17 11:12 PM   Result Value Ref Range    TSH 1.49 0.36 - 3.74 uIU/mL   URINALYSIS W/ REFLEX CULTURE    Collection Time: 01/19/17  1:35 AM   Result Value Ref Range    Color DARK YELLOW      Appearance CLEAR CLEAR      Specific gravity 1.029 1.003 - 1.030      pH (UA) 5.0 5.0 - 8.0      Protein 100 (A) NEG mg/dL    Glucose >1000 (A) NEG mg/dL    Ketone NEGATIVE  NEG mg/dL    Blood NEGATIVE  NEG      Urobilinogen 1.0 0.2 - 1.0 EU/dL    Nitrites NEGATIVE  NEG      Leukocyte Esterase NEGATIVE  NEG      WBC 0-4 0 - 4 /hpf    RBC 0-5 0 - 5 /hpf    Epithelial cells FEW FEW /lpf    Bacteria 1+ (A) NEG /hpf    UA:UC IF INDICATED URINE CULTURE ORDERED (A) CNI      Hyaline Cast 10-20 0 - 5 /lpf   SODIUM, UR, RANDOM    Collection Time: 01/19/17  1:35 AM   Result Value Ref Range    Sodium urine, random 14 MMOL/L   PROTEIN/CREATININE RATIO, URINE    Collection Time: 01/19/17  1:35 AM   Result Value Ref Range    Protein, urine random 153 (H) 0.0 - 11.9 mg/dL    Creatinine, urine 190.36 mg/dL    Protein/Creat.  urine Ratio 0.8     BILIRUBIN, CONFIRM    Collection Time: 01/19/17  1:35 AM   Result Value Ref Range    Bilirubin UA, confirm NEGATIVE  NEG     LIPID PANEL    Collection Time: 01/19/17  3:50 AM   Result Value Ref Range    LIPID PROFILE          Cholesterol, total 120 <200 MG/DL    Triglyceride 125 <150 MG/DL    HDL Cholesterol 19 MG/DL    LDL, calculated 76 0 - 100 MG/DL    VLDL, calculated 25 MG/DL    CHOL/HDL Ratio 6.3 (H) 0 - 5.0     GLUCOSE, POC    Collection Time: 01/19/17  8:01 AM   Result Value Ref Range    Glucose (POC) 423 (H) 65 - 100 mg/dL    Performed by Ai Kaplan    GLUCOSE, POC    Collection Time: 01/19/17 11:30 AM   Result Value Ref Range    Glucose (POC) 200 (H) 65 - 100 mg/dL    Performed by Caludette Fortune (PCT)    TROPONIN I    Collection Time: 01/19/17 11:33 AM   Result Value Ref Range    Troponin-I, Qt. <0.04 <0.05 ng/mL       Discussed with:    Patient, Family and Nurse  Thank you so much to allow us to participate in this patient's care. We will follow.  : Madeline Darby MD  1/19/2017      [facility]:  [email or web address]  Berenice Patel office:  [address], [address] [place] [address],[address]  [place], [address] [place] [address]  Phone: [phone]  Fax :     [phone]    [place] office:  [address] [facility]  Heber Victor  Phone - [phone]  Fax - [phone]

## 2017-01-19 NOTE — H&P
Admission History and Physical      NAME:  Steph Person. :   1950   MRN:  [id number]     PCP:  None     Date/Time:  2017           Assessment/Plan:       Active Problems:    Congestive heart failure ([facility] unspecified / Shortness of breath / Bilateral lower extremity edema . Likely systolic. Admit to telemetry. Cardiology consult. IV diuresis. TTE. Serial Mayco. Daily weight. Strict Is and Os. BP and ARF precludes standard measures. Will start low-dose coreg. Lipid panel and likely statin. Start ASA. Acute renal failure (ARF) ([facility]): likely secondary to CHF v. DM2. Check urine lytes. Renal US given hx of urinary retention. Monitor Is and Os and UOP closely. Renally dose meds and check serial BMPs. Hyperglycemia due to type 2 diabetes mellitus. New diagnosis. Will start conservative weight based lantus. FSBG. SSI. IP diabetes management team to assist with new diagnosis and meter/injection training. Can consider metformin v. Sulfonylurea if EF okay and renal function improves. Tobacco abuse: 2 ppd smoker for many years, Approximately 3 minutes in addition to HPI was spent solely on smoking cessation counseling. He is interested in smoking cessation and would like to try a nicotine patch. Left lower lobe and lingular pulmonary infiltrates: low suspicion for any air space disease but has pleural effusions that make parenchymal evaluation difficult. Will get CT chest to further evaluate. Hold on any ABx. Subjective:     CHIEF COMPLAINT: \"My legs have been swelling really bad for a few weeks\"    HISTORY OF PRESENT ILLNESS:     Mr. Soha Gaitan is a 61 y.o.  male with no significant medical history and general avoidance of the healthcare system who is admitted with new onset congestive heart failure, acute renal failure and new diagnosis of diabetes mellitus type II.   Mr. Soha Gaitan presented to the Emergency Department today complaining of a few weeks of worsening lower extremity swelling. He reports that the last time he sought medical care was for leg swelling and SOB over 3 yrs ago at [facility] but cannot recall anything. He has not seen a PCP since then. He takes no medication. He reports worsening NAVARRO/SOB/PND/4 pillow orthopnea that has waxed and waned for those 3 years but progressively worsened over 3 weeks. Also notes sternal chest pressure, radiating to back and arms. Happens in AM. Nothing makes better or worse. Cannot be provoked. Also notes worsening LE edema with weeping. We will admit for further work-up and management. Past medical History: Denies    Past surgical Hx: Denies    Social History   Substance Use Topics    Smoking status: Current smoker, 2 packs/day    Smokeless tobacco: Not on file    Alcohol use Denies        Denies family hx of early CAD    No Known Allergies     Prior to Admission medications    Not on File         Review of Systems:  (bold if positive, if negative)    Gen:  Weight gainEyes:  ENT:  CVS:  Palpitations, chest painedema, PNDPulm:  Cough, dyspneaGI:    :    MS:  Skin:  Psych:  Endo:    Hem:  Renal:    Neuro:            Objective:      VITALS:    Vital signs reviewed; most recent are:    Visit Vitals    /80 (BP 1 Location: Left arm, BP Patient Position: At rest)    Pulse 93    Temp 98.3 °F (36.8 °C)    Resp 22    Ht 5' 9.5\" (1.765 m)    Wt 77.1 kg (170 lb)    SpO2 92%    BMI 24.74 kg/m2     SpO2 Readings from Last 6 Encounters:   01/19/17 92%        No intake or output data in the 24 hours ending 01/19/17 0518         Exam:     Physical Exam:    Gen:  Well-developed, well-nourished, in no acute distress  HEENT:  Pink conjunctivae, PERRL, hearing intact to voice, moist mucous membranes  Neck:  Supple, without masses, ++JVD, + hepatojugular reflux  Resp:  No accessory muscle use, crackles appreciated bilaterally, no increased work of breathing  Card:  No murmurs, normal S1, S2 without thrills, bruits.  +3 weeping/pitting edema to above knees with chronic venous stasis changes  Abd:  Soft, non-tender, non-distended, normoactive bowel sounds are present  Lymph:  No cervical adenopathy  Musc:  No cyanosis or clubbing  Skin:  No rashes or ulcers, skin turgor is good  Neuro:  Cranial nerves 3-12 are grossly intact,  strength is 5/5 bilaterally, dorsi / plantarflexion strength is 5/5 bilaterally, follows commands appropriately  Psych:  Alert with good insight. Oriented to person, place, and time             Labs:    Recent Labs      01/18/17   2311   WBC  8.5   HGB  13.6   HCT  40.8   PLT  254     Recent Labs      01/18/17   2311   NA  129*   K  3.5   CL  90*   CO2  28   GLU  491*   BUN  40*   CREA  2.33*   CA  8.8   MG  1.6   ALB  2.9*   TBILI  0.9   SGOT  10*   ALT  13     No results found for: GLUCPOC  No results for input(s): PH, PCO2, PO2, HCO3, FIO2 in the last 72 hours. No results for input(s): INR in the last 72 hours. No lab exists for component: INREXT    Chest Xray:  I have personally evaluated this image. Left sided pleural effusion, ? infiltrates/consolidations on left  EKG reviewed:   Sinus tachycardia. PVC. Non-specific TW changes    Medical records reviewed in preparation for this admission: Nursing notes.     Surrogate decision maker:  patient and spouse    Total time spent with patient: [address] discussed with: Patient, Family, Nursing Staff and >50% of time spent in counseling and coordination of care    Discussed:  Care Plan and D/C Planning    Prophylaxis:  Hep SQ    Probable Disposition:  Home w/Family           ___________________________________________________    Attending Physician: Cindy Burgess,

## 2017-01-19 NOTE — ED NOTES
TRANSFER - OUT REPORT:    Verbal report given to Brodie Lester RN (name) on [facility].  being transferred to [facility] ([facility] for routine progression of care       Report consisted of patients Situation, Background, Assessment and   Recommendations(SBAR). Information from the following report(s) SBAR, ED Summary and MAR was reviewed with the receiving nurse. Lines:   Peripheral IV 01/18/17 Right Forearm (Active)   Site Assessment Clean, dry, & intact 1/18/2017 11:09 PM   Phlebitis Assessment 0 1/18/2017 11:09 PM   Infiltration Assessment 0 1/18/2017 11:09 PM   Dressing Status Clean, dry, & intact 1/18/2017 11:09 PM   Dressing Type Transparent 1/18/2017 11:09 PM   Hub Color/Line Status Pink 1/18/2017 11:09 PM        Opportunity for questions and clarification was provided.       Patient transported with:   Monitor  Registered Nurse

## 2017-01-19 NOTE — PROGRESS NOTES
[facility]: MED RECONCILIATION    Comments:   Patient has been using his significant other's Hydrochlorothiazide (probably 25 mg) once daily for last 2 months for noted swelling in the legs. He stated that he does not have a doctor and will get a PCP now. Allergies: Review of patient's allergies indicates no known allergies. Prior to Admission Medications   Prescriptions Last Dose Informant Patient Reported? Taking?   hydroCHLOROthiazide (HYDRODIURIL) 25 mg tablet  Self Yes Yes   Sig: Take 25 mg by mouth daily. ibuprofen (ADVIL) 200 mg tablet  Self Yes Yes   Sig: Take 400 mg by mouth two (2) times a day.            [address] [facility], PHARMD   [id number]

## 2017-01-19 NOTE — ED PROVIDER NOTES
HPI Comments: 61 y.o. male with past medical history significant for CHF who presents to the ED with chief complaint of leg swelling. Pt reports bilateral leg swelling \"from his feet up to his knees\" with drainage onset about 2 weeks ago accompanied by intermittent chest pain and chronic SOB. Pt states he has been unable to lay flat due to SOB for the past month. Pt states he has hx of leg swelling but says his current sx are worse. Pt states he has no PCP and has not been seen by a physician for his sx. Pt denies hx of MI. Pt denies taking any regular medications. Pt denies fever, abdominal pain, or abdominal swelling. There are no other acute medical complaints voiced at this time. Social Hx: Heavy smoker. Former EtOH use (not heavy). PCP: No primary care provider on file. Note written by Timmy Madrigal, as dictated by Joanne Connor MD 10:24 PM     The history is provided by the patient. No past medical history on file. No past surgical history on file. No family history on file. Social History     Social History    Marital status: N/A     Spouse name: N/A    Number of children: N/A    Years of education: N/A     Occupational History    Not on file. Social History Main Topics    Smoking status: Not on file    Smokeless tobacco: Not on file    Alcohol use Not on file    Drug use: Not on file    Sexual activity: Not on file     Other Topics Concern    Not on file     Social History Narrative         ALLERGIES: Review of patient's allergies indicates no known allergies. Review of Systems   Constitutional: Negative for fever. Respiratory: Positive for shortness of breath (chronic). Cardiovascular: Positive for chest pain (intermittent) and leg swelling (bilateral). Gastrointestinal: Negative for abdominal distention and abdominal pain. Skin:        +drainage from legs   All other systems reviewed and are negative.       Vitals:    01/18/17 2121   BP: 103/73   Pulse: (!) 116   Resp: 20   Temp: 97.8 °F (36.6 °C)   SpO2: 97%   Weight: 77.1 kg (170 lb)   Height: 5' 9.5\" (1.765 m)            Physical Exam   Physical Examination: General appearance - alert, chronically ill appearing, mild distress, oriented to person, place, and time and normal appearing weight  Eyes - pupils equal and reactive, extraocular eye movements intact  Neck - supple, no significant adenopathy  Chest - clear to auscultation, no wheezes, rales or rhonchi, symmetric air entry  Heart - regular tachycardia, normal S1, S2, no murmurs, rubs, clicks or gallops  Abdomen - soft, nontender, nondistended, no masses or organomegaly, reducible periumbilical hernia, nontender  Back exam - full range of motion, no tenderness, palpable spasm or pain on motion  Neurological - alert, oriented, normal speech, no focal findings or movement disorder noted  Musculoskeletal - no joint tenderness, deformity or swelling  Extremities - peripheral pulses normal, pitting edema to b/l LE, no clubbing or cyanosis  Skin - normal coloration and turgor, no rashes, no suspicious skin lesions noted  MDM  Number of Diagnoses or Management Options     Amount and/or Complexity of Data Reviewed  Clinical lab tests: ordered and reviewed  Tests in the radiology section of CPT®: ordered and reviewed  Obtain history from someone other than the patient: yes (wife)  Discuss the patient with other providers: yes (ED physician)  Independent visualization of images, tracings, or specimens: yes    Patient Progress  Patient progress: stable    ED Course       Procedures  EKG interpretation: (Preliminary)  Rhythm: sinus tachycardia; and irregular. Rate (approx.): 108; Axis: normal; AL interval: normal; QRS interval: prolonged; ST/T wave: non-specific changes; PVCs, no old EKG for comparison    11:43 PM  Pt signed out to Dr. Tonya Calles pending labs and dispo. Plan for admission.

## 2017-01-19 NOTE — PROGRESS NOTES
1480 Assumed care of pt. Pt. Resting in stretcher with call bell at bedside. Identified self as primary nurse. Answered questions and discussed plan of care at this time. Will continue to monitor. Admission orders released. 0345 AM Labs drawn. Database complete. Pt. Resting in bed with wife at bedside. Will continue to monitor. 5415 Pt. Complaining of burning, pain in legs. Spoke with Dr. Angela Sparrow, placed orders for pain medications. 8/10 pain scale. 0720 Bedside and Verbal shift change report given to [address] [place] [address] (oncoming nurse) by Vitaliy Pantoja RN (offgoing nurse). Report included the following information SBAR, ED Summary, MAR and Cardiac Rhythm nsr.

## 2017-01-19 NOTE — PROGRESS NOTES
1720 - TRANSFER - IN REPORT:    Verbal report received from [address] RN(name) on [employer].  being received from ER(unit) for routine progression of care      Report consisted of patients Situation, Background, Assessment and   Recommendations(SBAR). Information from the following report(s) SBAR, Kardex, Intake/Output, Accordion and Recent Results was reviewed with the receiving nurse. Opportunity for questions and clarification was provided. Assessment completed upon patients arrival to unit and care assumed. 1800 - Pt arrived to floor from ER. Pt accompanied by wife. Pt A&O x 4. Respirations even and unlabored at the moment but pt does c/o SOB with some exertion and when laying flat. Pt and wife oriented to staff and policies. Bed in lowest position and call bell within reach. 1846 - Pt BP 88/66 on L arm, 86/64 on R arm with bumex and coreg due and pt requesting Dilaudid. MD acknowledged. Phone order to hold Bumex and Dilaudid and give Coreg. Order also rec'd to give Ultram 50mg PO Q6H PRN for pain instead of Dilaudid. Orderd RB&V and entered. See Leonidastraeusebio 15 - Bedside and Verbal shift change report given to Melisa THOMAS (oncoming nurse) by Katrin Morales (offgoing nurse). Report included the following information SBAR, Kardex, Intake/Output, Accordion, Recent Results and Cardiac Rhythm NSR.

## 2017-01-19 NOTE — PROGRESS NOTES
Sudhakar Claudio vy Dickson [address]  [address] [facility], [place], [address] [place] [address]  [phone]      Medical Progress Note      NAME: Lauryn Tao. :  1950  MRM:  [id number]    Date/Time: 2017  12:44 PM       Assessment and Plan:     Acute systolic (congestive) heart failure / Sinus tachycardia - ECHO showed \"The ventricle was moderately dilated. Systolic function was severely reduced. Ejection fraction was estimated in the range of 5 % to 10 %. There was severe diffuse hypokinesis. \" [address] cardiology consult. Diuresis with bumex if tolerated. Started ASA, coreg    Pulmonary edema / Shortness of breath / Hypoxia - POA due to CHF. Oxygen as needed. Will need 6 minute walk    Acute renal failure - POA, unclear chronicity, likely related to DM, CHF, NSAID, HCTZ. Renal consulted. Monitor    Hyperglycemia due to type 2 diabetes mellitus, with vascular and renal complications - Diabetic/CHF diet and counseling. SSI per protocol. Start Lantus. A1c over 13. Bilateral lower extremity edema / Venous stasis dermatitis of both lower extremities - Supportive care. Wound consult    Tobacco abuse - Nicoderm patch. Subjective:     Chief Complaint:  Leg pain and mild dyspnea    ROS:  (bold if positive, if negative)    SOB/NAVARRO  Tolerating some PT  Tolerating Diet        Objective:     Last 24hrs VS reviewed since prior progress note.  Most recent are:    Visit Vitals    /86    Pulse (!) 107    Temp 98.5 °F (36.9 °C)    Resp 17    Ht 5' 9.5\" (1.765 m)    Wt 77.1 kg (170 lb)    SpO2 93%    BMI 24.74 kg/m2     SpO2 Readings from Last 6 Encounters:   17 93%          Intake/Output Summary (Last 24 hours) at 17 1244  Last data filed at 17 8115   Gross per 24 hour   Intake                0 ml   Output              410 ml   Net             -410 ml        Physical Exam:    Gen:  Well-developed, well-nourished, in mild acute distress  HEENT:  Pink conjunctivae, PERRL, hearing intact to voice, moist mucous membranes  Neck:  Supple, without masses, thyroid non-tender  Resp:  No accessory muscle use, clear breath sounds with rales  Card:  No murmurs, tachycardic S1, S2 without thrills, bruits, 1+ peripheral edema  Abd:  Soft, non-tender, non-distended, normoactive bowel sounds are present, no mass  Lymph:  No cervical or inguinal adenopathy  Musc:  No cyanosis or clubbing  Skin:  Bilateral chronic stasis dermatitis, skin turgor is good  Neuro:  Cranial nerves are grossly intact, mild motor weakness, follows commands appropriately  Psych:   Moderate insight, oriented to person, place and time, alert    Telemetry reviewed:   normal sinus rhythm  __________________________________________________________________  Medications Reviewed: (see below)  Medications:     Current Facility-Administered Medications   Medication Dose Route Frequency    bumetanide (BUMEX) injection 1 mg  1 mg IntraVENous BID    aspirin delayed-release tablet 81 mg  81 mg Oral DAILY    glucose chewable tablet 16 g  4 Tab Oral PRN    dextrose (D50W) injection syrg 12.5-25 g  12.5-25 g IntraVENous PRN    glucagon (GLUCAGEN) injection 1 mg  1 mg IntraMUSCular PRN    insulin lispro (HUMALOG) injection   SubCUTAneous AC&HS    carvedilol (COREG) tablet 3.125 mg  3.125 mg Oral BID WITH MEALS    insulin glargine (LANTUS) injection 10 Units  10 Units SubCUTAneous DAILY    nicotine (NICODERM CQ) 21 mg/24 hr patch 1 Patch  1 Patch TransDERmal DAILY    acetaminophen (TYLENOL) tablet 650 mg  650 mg Oral Q6H PRN    diphenhydrAMINE (BENADRYL) capsule 25 mg  25 mg Oral Q6H PRN    heparin (porcine) injection 5,000 Units  5,000 Units SubCUTAneous Q8H    ondansetron (ZOFRAN) injection 6 mg  6 mg IntraVENous Q6H PRN    HYDROmorphone (PF) (DILAUDID) injection 1 mg  1 mg IntraVENous Q4H PRN    Or    HYDROmorphone (PF) (DILAUDID) injection 2 mg  2 mg IntraVENous Q4H PRN     Current Outpatient Prescriptions Medication Sig    ibuprofen (ADVIL) 200 mg tablet Take 400 mg by mouth two (2) times a day.  hydroCHLOROthiazide (HYDRODIURIL) 25 mg tablet Take 25 mg by mouth daily. Lab Data Reviewed: (see below)  Lab Review:     Recent Labs      01/18/17   2311   WBC  8.5   HGB  13.6   HCT  40.8   PLT  254     Recent Labs      01/18/17   2311   NA  129*   K  3.5   CL  90*   CO2  28   GLU  491*   BUN  40*   CREA  2.33*   CA  8.8   MG  1.6   ALB  2.9*   TBILI  0.9   SGOT  10*   ALT  13     Lab Results   Component Value Date/Time    Glucose (POC) 200 01/19/2017 11:30 AM    Glucose (POC) 423 01/19/2017 08:01 AM     No results for input(s): PH, PCO2, PO2, HCO3, FIO2 in the last 72 hours. No results for input(s): INR in the last 72 hours. No lab exists for component: INREXT  All Micro Results     Procedure Component Value Units Date/Time    CULTURE, URINE [[id number]] Collected:  01/19/17 0135    Order Status:  Completed Specimen:  Urine Updated:  01/19/17 0905          I have reviewed notes of prior 24hr.     Other pertinent lab: none    Total time spent with patient: [address] discussed with: Patient, Family, Care Manager, Nursing Staff, Consultant/Specialist and >50% of time spent in counseling and coordination of care    Discussed:  Care Plan    Prophylaxis:  H2B/PPI    Disposition:  Home w/Family           ___________________________________________________    Attending Physician: Walker Degroot MD

## 2017-01-19 NOTE — ED NOTES
Verbal shift change report given to Jeramy Davis RN (oncoming nurse) by Cindy Torres RN (offgoing nurse). Report included the following information SBAR, ED Summary, MAR and Recent Results.

## 2017-01-19 NOTE — CONSULTS
Shon Lema MD, [address] [facility]  Office [phone]    Date of  Admission: 1/18/2017 10:16 PM         IMPRESSION and RECOMMENDATIONS     1. Dyspnea/edema:  Clinical picture looks to be CHF. Not much in the way of interstitial edema on CXR, left pleural effusion. Heart size normal.  Agree with coreg, iv loops, ASA, and echo. Further mgmt based on echo. Smoking cessation. I have discussed this plan with the patient. He appears to understand this plan and wishes to proceed ahead. Problem List  Date Reviewed: 1/19/2017          Codes Class Noted    Congestive heart failure ([facility] ICD-10-CM: I50.9  ICD-9-CM: 428.0  1/19/2017        Bilateral lower extremity edema ICD-10-CM: R60.0  ICD-9-CM: 782.3  1/19/2017        Shortness of breath ICD-10-CM: R06.02  ICD-9-CM: 786.05  1/19/2017        Acute renal failure (ARF) ([facility]) ICD-10-CM: N17.9  ICD-9-CM: 584.9  1/19/2017        Hyperglycemia due to type 2 diabetes mellitus ([facility] ICD-10-CM: E11.65  ICD-9-CM: 250.00  1/19/2017              History of Present Illness:     Fernanda Land. is a 61 y.o. male with the above problem list who was admitted for Congestive heart failure ([facility] Pt presents with SOB, NAVARRO, orthopnea, LE edema, worsening over the past 3 weeks. Apparently, has been seen for similar symptoms at [address] [place] [address] 3y ago, but no f/u and doesn't recall what happened. Now, writhing in pain due to left calf \"cramping\". LE edema began \"weeping\" so came to ER. He denies anginal-sounding chest pain/discomfort, palpitations, syncope, or near-syncope.     Current Facility-Administered Medications   Medication Dose Route Frequency    bumetanide (BUMEX) injection 1 mg  1 mg IntraVENous BID    aspirin delayed-release tablet 81 mg  81 mg Oral DAILY    glucose chewable tablet 16 g  4 Tab Oral PRN    dextrose (D50W) injection syrg 12.5-25 g  12.5-25 g IntraVENous PRN    glucagon (GLUCAGEN) injection 1 mg  1 mg IntraMUSCular PRN    insulin lispro (HUMALOG) injection   SubCUTAneous AC&HS    carvedilol (COREG) tablet 3.125 mg  3.125 mg Oral BID WITH MEALS    insulin glargine (LANTUS) injection 10 Units  10 Units SubCUTAneous DAILY    nicotine (NICODERM CQ) 21 mg/24 hr patch 1 Patch  1 Patch TransDERmal DAILY    acetaminophen (TYLENOL) tablet 650 mg  650 mg Oral Q6H PRN    diphenhydrAMINE (BENADRYL) capsule 25 mg  25 mg Oral Q6H PRN    heparin (porcine) injection 5,000 Units  5,000 Units SubCUTAneous Q8H    ondansetron (ZOFRAN) injection 6 mg  6 mg IntraVENous Q6H PRN    HYDROmorphone (PF) (DILAUDID) injection 1-2 mg  1-2 mg IntraVENous Q4H PRN     Current Outpatient Prescriptions   Medication Sig    ibuprofen (ADVIL) 200 mg tablet Take 400 mg by mouth two (2) times a day.  hydroCHLOROthiazide (HYDRODIURIL) 25 mg tablet Take 25 mg by mouth daily. No Known Allergies   No family history on file. Social History     Social History    Marital status:      Spouse name: N/A    Number of children: N/A    Years of education: N/A     Occupational History    Not on file.      Social History Main Topics    Smoking status: Not on file    Smokeless tobacco: Not on file    Alcohol use Not on file    Drug use: Not on file    Sexual activity: Not on file     Other Topics Concern    Not on file     Social History Narrative    No narrative on file       Physical Exam:     Patient Vitals for the past 16 hrs:   BP Temp Pulse Resp SpO2 Height Weight   01/19/17 0845 110/69 - (!) 105 19 (!) 86 % - -   01/19/17 0615 110/76 - (!) 110 24 92 % - -   01/19/17 0342 104/80 98.3 °F (36.8 °C) 93 22 92 % - -   01/19/17 0200 106/79 - (!) 102 26 91 % - -   01/19/17 0145 112/84 - (!) 103 25 (!) 89 % - -   01/19/17 0140 107/81 - (!) 102 - - - -   01/18/17 2330 - - - - 93 % - -   01/18/17 2330 103/74 - - - 93 % - -   01/18/17 2121 103/73 97.8 °F (36.6 °C) (!) 116 20 97 % 5' 9.5\" (1.765 m) 170 lb (77.1 kg)       HEENT Exam:     Normocephalic, atraumatic. EOMI. Oropharynx negative. Neck supple. No lymphadenopathy. Lung Exam:     The patient is not dyspneic. There is no cough. Decreased BS at left base. There are no wheezes, rales, rhonchi, or rubs heard on auscultation. Heart Exam:     The rhythm is regular. The PMI is in the 5th intercostal space of the MCL. Apical impulse is normal. S1 is regular. S2 is physiologic. There is no S3, S4 gallop, murmur, click, or rub. Abdomen Exam:     Bowel sounds are normoactive. Abdomen soft in all quadrants. No tenderness. No palpable masses. No organomegaly. No hernias noted. No bruits or pulsatile mass. Extremities Exam:     There is no clubbing, cyanosis, ulcers, varicose veins, noted in the extremities. Mild edema of legs with sig increased warmth and erythema. Vascular Exam:     The radial, brachial, are equal and strong bilaterally The carotids are equal bilaterally without bruits. Labs:     Lab Results   Component Value Date/Time    Glucose 491 01/18/2017 11:11 PM    Sodium 129 01/18/2017 11:11 PM    Potassium 3.5 01/18/2017 11:11 PM    Chloride 90 01/18/2017 11:11 PM    CO2 28 01/18/2017 11:11 PM    BUN 40 01/18/2017 11:11 PM    Creatinine 2.33 01/18/2017 11:11 PM    Calcium 8.8 01/18/2017 11:11 PM     Recent Labs      01/18/17 2311   WBC  8.5   HGB  13.6   HCT  40.8   PLT  254     Recent Labs      01/18/17 2311   SGOT  10*   ALT  13   AP  83   TBILI  0.9   TP  6.9   ALB  2.9*   GLOB  4.0     No results for input(s): INR, PTP, APTT in the last 72 hours.     No lab exists for component: INREXT   Recent Labs      01/18/17 2311   CPK  51   CKMB  2.9     Recent Labs      01/18/17 2311   TROIQ  <0.04     No results found for: CHOL, CHOLX, CHLST, CHOLV, HDL, LDL, DLDL, LDLC, DLDLP, TGL, TGLX, TRIGL, TRIGP, CHHD, CHHDX    EKG:  ST @ 108, PAC, PVC, ANGE, LBBB, NSSTTW abn.

## 2017-01-19 NOTE — PROGRESS NOTES
Occupational Therapy Note:  Orders acknowledged, chart reviewed, and spoke with nursing. Spoke with patient who is presenting with c/o excruciating pain to B LE in the form of cramping + burning. Patient reports he is unable to tolerate any OOB activity due to pain. RN in room administering additional pain medication. Will continue to follow for OT evaluation as patient is able to tolerate. Thank you.   Tapan Lott OTR/L

## 2017-01-19 NOTE — PROGRESS NOTES
1/19/2017   CARE MANAGEMENT NOTE:  CM is following pt in the ER for initial discharge planning. EMR reviewed. CM met with pt and his wife at bedside to obtain hx for this needs assessment. Reportedly, pt resides with his wife ([phone]) in a one story [place] home. PTA, pt was ambulatory, indepn with ADLs but has been doing limited driving recently. Pt is retired and he does not have insurance coverage. Wife states that the government insurance plans were not affordable. Pt has the Care Card info and was encouraged to complete the application as soon as possible once it is received. Pt does not have any home healthcare nor DME for home use. PCP - none. Pt has been refusing to see a physician. Plan is to return home when medically stable. He would benefit from [facility].   Hector

## 2017-01-19 NOTE — DIABETES MGMT
DTC Consult Note     POC Glucose last 24hrs:     Lab Results   Component Value Date/Time     (H) 01/18/2017 11:11 PM    GLUCPOC 423 (H) 01/19/2017 08:01 AM        Recommendations/ Comments: Given cost issues, please consider NPH twice a day (7 units), if pt A1C is >8. May wish to start with a sulfonylurea otherwise, such as Glimepiride 4mg - which is a good choice given current kidney function. Noted A1C ordered - result pending. Pt was agreeable to suggestions, engaged during interview. Was given an opportunity to ask questions. Verbalized understanding of suggestions. Wife of 21 years at bedside, states \"he won't do it. \" Echo also going on during consult. [facility]  [facility]    Assessment of home management  []    Meal planning  []    Meter / Monitoring  [x]    New Diagnosis  []    Insulin education  []    Insulin pump notification  []    Medication recommendations  []    Hospital glucose management  []    Nicolas Cota  []    Outpatient Education - Information forwarded to [employer] who will follow-up with pt 1 week after discharge     Hospital (inpatient) medications:  1. Correction Scale: Lispro (Humalog) High Sensitivity scale (thin, ESRD) to cover for glucose > 199 mg/dL  before meals and for glucose >199 at bedtime    2. Lantus 10 units daily     Assessment / Plan:     Chart reviewed and initial evaluation complete on Fernanda Willis. Gregoria Bush. is a 62 yo  male with newly diagnosed  DM, [facility].  Per patient and past medical records home medications are  -none for DM            Upon admission: Glucose 491, AG 11  UA: + Glucose, - Ketones     A1C:   No results found for: HBA1C, HGBE8, [id number]    Assessed and instructed patient on the following  · Diabetes: disease process   · Blood sugar goals   · Causes of high / low blood glucose and treatment  · Lab results: A1C - target - result pending. Discussed importance of long-term glucose management   · Blood sugar monitoring: discussed monitoring twice a day and values   · Sharps disposal  · Sick day guidelines  · Meal planning: currently, pt eats 1 large meals a day. There is conflicting information between Mr. And Mrs. Bhumika Glass as to how often and what he eats. · Activity guidelines   · Foot care  · Chronic complications and Standards of Care  · Delayed healing      Provided patient with the following: [x]    Diabetes Self-Care Guide                [x]    Outpatient DTC contact number               []    Glucometer                [x]    Insulin Education Guide               []    Carbohydrate Counting Guide               []    Sample meal plan                 []    Chair exercises guide               [x]    [employer] Reli-On Product Catalog      Will follow up for insulin instruction. Patient was able to give return demonstration of []    glucometer, []    saline injection with [x]    no/ []    minimal assistance needed. [x]    Nurse to have patient self inject prior to discharge. Thank you. Roberta Yadav, Certified Diabetes Educator    [phone]      Addendum: A1C resulted. Noted Lantus to be started. Pt does not have insurance, so Lantus is not feasible. Please consider the followin. Insulin resistant scale ac and hs  2. NPH 7 units before breakfast and before dinner   Pt will require prescriptions for the followin. Reli-On Meter  2. Reli-On Test Strips #100  3. Reli-On Insulin Syringes 31G #100  4. Reli-On Lancets - Ultra Thin Plus  5. Reli-On Humulin N  (NPH)  6. Reli-On Humulin R (insulin Regular)   Pt was able to return demonstrate insulin injection usage. Olivia Shine.  KATHRIN Gale, [address] [place] [address]   [phone] (office)  [phone] (pager)

## 2017-01-20 ENCOUNTER — APPOINTMENT (OUTPATIENT)
Dept: GENERAL RADIOLOGY | Age: 67
DRG: 215 | End: 2017-01-20
Attending: THORACIC SURGERY (CARDIOTHORACIC VASCULAR SURGERY)
Payer: SELF-PAY

## 2017-01-20 ENCOUNTER — HOSPITAL ENCOUNTER (INPATIENT)
Age: 67
LOS: 33 days | Discharge: HOME HEALTH | DRG: 215 | End: 2017-02-22
Attending: THORACIC SURGERY (CARDIOTHORACIC VASCULAR SURGERY) | Admitting: THORACIC SURGERY (CARDIOTHORACIC VASCULAR SURGERY)
Payer: SELF-PAY

## 2017-01-20 VITALS
DIASTOLIC BLOOD PRESSURE: 72 MMHG | WEIGHT: 178.57 LBS | RESPIRATION RATE: 18 BRPM | SYSTOLIC BLOOD PRESSURE: 104 MMHG | BODY MASS INDEX: 25.56 KG/M2 | OXYGEN SATURATION: 98 % | HEART RATE: 88 BPM | TEMPERATURE: 97.9 F | HEIGHT: 70 IN

## 2017-01-20 DIAGNOSIS — Z71.89 COUNSELING REGARDING ADVANCED DIRECTIVES AND GOALS OF CARE: ICD-10-CM

## 2017-01-20 DIAGNOSIS — R60.0 BILATERAL LOWER EXTREMITY EDEMA: ICD-10-CM

## 2017-01-20 DIAGNOSIS — R06.02 SHORTNESS OF BREATH: ICD-10-CM

## 2017-01-20 DIAGNOSIS — M79.605 PAIN IN BOTH LOWER EXTREMITIES: ICD-10-CM

## 2017-01-20 DIAGNOSIS — R76.8 HCV ANTIBODY POSITIVE: ICD-10-CM

## 2017-01-20 DIAGNOSIS — M79.604 PAIN IN BOTH LOWER EXTREMITIES: ICD-10-CM

## 2017-01-20 PROBLEM — I42.9 CARDIOMYOPATHY (HCC): Status: ACTIVE | Noted: 2017-01-20

## 2017-01-20 PROBLEM — I50.20 SYSTOLIC HEART FAILURE (HCC): Status: ACTIVE | Noted: 2017-01-20

## 2017-01-20 PROBLEM — I25.5 ISCHEMIC CARDIOMYOPATHY: Status: ACTIVE | Noted: 2017-01-20

## 2017-01-20 LAB
ANION GAP BLD CALC-SCNC: 13 MMOL/L (ref 5–15)
APTT PPP: 20.2 SEC (ref 22.1–32.5)
ARTERIAL PATENCY WRIST A: ABNORMAL
BACTERIA SPEC CULT: NORMAL
BASE DEFICIT BLDV-SCNC: 1 MMOL/L
BDY SITE: ABNORMAL
BILIRUB UR QL CFM: POSITIVE
BUN SERPL-MCNC: 45 MG/DL (ref 6–20)
BUN/CREAT SERPL: 21 (ref 12–20)
CALCIUM SERPL-MCNC: 8.7 MG/DL (ref 8.5–10.1)
CC UR VC: NORMAL
CHLORIDE SERPL-SCNC: 91 MMOL/L (ref 97–108)
CO2 SERPL-SCNC: 24 MMOL/L (ref 21–32)
CREAT SERPL-MCNC: 2.12 MG/DL (ref 0.7–1.3)
ERYTHROCYTE [DISTWIDTH] IN BLOOD BY AUTOMATED COUNT: 15.6 % (ref 11.5–14.5)
GAS FLOW.O2 O2 DELIVERY SYS: ABNORMAL L/MIN
GAS FLOW.O2 SETTING OXYMISER: 3 L/M
GLUCOSE BLD STRIP.AUTO-MCNC: 156 MG/DL (ref 65–100)
GLUCOSE BLD STRIP.AUTO-MCNC: 197 MG/DL (ref 65–100)
GLUCOSE BLD STRIP.AUTO-MCNC: 229 MG/DL (ref 65–100)
GLUCOSE BLD STRIP.AUTO-MCNC: 234 MG/DL (ref 65–100)
GLUCOSE SERPL-MCNC: 151 MG/DL (ref 65–100)
HCO3 BLDV-SCNC: 24.1 MMOL/L (ref 23–28)
HCT VFR BLD AUTO: 39.5 % (ref 36.6–50.3)
HGB BLD-MCNC: 13.3 G/DL (ref 12.1–17)
MAGNESIUM SERPL-MCNC: 1.6 MG/DL (ref 1.6–2.4)
MAGNESIUM SERPL-MCNC: 1.7 MG/DL (ref 1.6–2.4)
MCH RBC QN AUTO: 25.2 PG (ref 26–34)
MCHC RBC AUTO-ENTMCNC: 33.7 G/DL (ref 30–36.5)
MCV RBC AUTO: 75 FL (ref 80–99)
PCO2 BLDV: 40.6 MMHG (ref 41–51)
PH BLDV: 7.38 [PH] (ref 7.32–7.42)
PLATELET # BLD AUTO: 233 K/UL (ref 150–400)
PO2 BLDV: 24 MMHG (ref 25–40)
POTASSIUM SERPL-SCNC: 2.9 MMOL/L (ref 3.5–5.1)
POTASSIUM SERPL-SCNC: 4.1 MMOL/L (ref 3.5–5.1)
RBC # BLD AUTO: 5.27 M/UL (ref 4.1–5.7)
SAO2 % BLDV: 41 % (ref 65–88)
SERVICE CMNT-IMP: ABNORMAL
SERVICE CMNT-IMP: NORMAL
SODIUM SERPL-SCNC: 128 MMOL/L (ref 136–145)
SPECIMEN TYPE: ABNORMAL
THERAPEUTIC RANGE,PTTT: ABNORMAL SECS (ref 58–77)
WBC # BLD AUTO: 7 K/UL (ref 4.1–11.1)

## 2017-01-20 PROCEDURE — 71010 XR CHEST PORT: CPT

## 2017-01-20 PROCEDURE — 81001 URINALYSIS AUTO W/SCOPE: CPT | Performed by: THORACIC SURGERY (CARDIOTHORACIC VASCULAR SURGERY)

## 2017-01-20 PROCEDURE — 82962 GLUCOSE BLOOD TEST: CPT

## 2017-01-20 PROCEDURE — 05HM33Z INSERTION OF INFUSION DEVICE INTO RIGHT INTERNAL JUGULAR VEIN, PERCUTANEOUS APPROACH: ICD-10-PCS | Performed by: ANESTHESIOLOGY

## 2017-01-20 PROCEDURE — C1751 CATH, INF, PER/CENT/MIDLINE: HCPCS

## 2017-01-20 PROCEDURE — 74011250636 HC RX REV CODE- 250/636: Performed by: NURSE PRACTITIONER

## 2017-01-20 PROCEDURE — 93458 L HRT ARTERY/VENTRICLE ANGIO: CPT

## 2017-01-20 PROCEDURE — 74011250637 HC RX REV CODE- 250/637: Performed by: INTERNAL MEDICINE

## 2017-01-20 PROCEDURE — 85730 THROMBOPLASTIN TIME PARTIAL: CPT | Performed by: NURSE PRACTITIONER

## 2017-01-20 PROCEDURE — 74011636320 HC RX REV CODE- 636/320: Performed by: SPECIALIST

## 2017-01-20 PROCEDURE — 80048 BASIC METABOLIC PNL TOTAL CA: CPT | Performed by: INTERNAL MEDICINE

## 2017-01-20 PROCEDURE — 4A023N8 MEASUREMENT OF CARDIAC SAMPLING AND PRESSURE, BILATERAL, PERCUTANEOUS APPROACH: ICD-10-PCS | Performed by: SPECIALIST

## 2017-01-20 PROCEDURE — 74011000250 HC RX REV CODE- 250: Performed by: NURSE PRACTITIONER

## 2017-01-20 PROCEDURE — 74011000258 HC RX REV CODE- 258: Performed by: NURSE PRACTITIONER

## 2017-01-20 PROCEDURE — 36415 COLL VENOUS BLD VENIPUNCTURE: CPT | Performed by: INTERNAL MEDICINE

## 2017-01-20 PROCEDURE — 77030029211 HC GEL MEDIH TU INLC -B

## 2017-01-20 PROCEDURE — 77030013798 HC KT TRNSDUC PRSSR EDWD -B

## 2017-01-20 PROCEDURE — 74011250636 HC RX REV CODE- 250/636: Performed by: SPECIALIST

## 2017-01-20 PROCEDURE — 77030029065 HC DRSG HEMO QCLOT ZMED -B

## 2017-01-20 PROCEDURE — 65610000003 HC RM ICU SURGICAL

## 2017-01-20 PROCEDURE — 77030004870 HC CATH CV SWN EDWD -C

## 2017-01-20 PROCEDURE — 74011636637 HC RX REV CODE- 636/637: Performed by: NURSE PRACTITIONER

## 2017-01-20 PROCEDURE — 74011250637 HC RX REV CODE- 250/637: Performed by: NURSE PRACTITIONER

## 2017-01-20 PROCEDURE — 74011636637 HC RX REV CODE- 636/637: Performed by: THORACIC SURGERY (CARDIOTHORACIC VASCULAR SURGERY)

## 2017-01-20 PROCEDURE — 74011250637 HC RX REV CODE- 250/637: Performed by: SPECIALIST

## 2017-01-20 PROCEDURE — 82803 BLOOD GASES ANY COMBINATION: CPT

## 2017-01-20 PROCEDURE — 85027 COMPLETE CBC AUTOMATED: CPT | Performed by: INTERNAL MEDICINE

## 2017-01-20 PROCEDURE — 74011000250 HC RX REV CODE- 250: Performed by: SPECIALIST

## 2017-01-20 PROCEDURE — 83735 ASSAY OF MAGNESIUM: CPT | Performed by: INTERNAL MEDICINE

## 2017-01-20 PROCEDURE — 74011636637 HC RX REV CODE- 636/637: Performed by: INTERNAL MEDICINE

## 2017-01-20 PROCEDURE — B2111ZZ FLUOROSCOPY OF MULTIPLE CORONARY ARTERIES USING LOW OSMOLAR CONTRAST: ICD-10-PCS | Performed by: SPECIALIST

## 2017-01-20 PROCEDURE — 77030034850

## 2017-01-20 PROCEDURE — 77030011256 HC DRSG MEPILEX <16IN NO BORD MOLN -A

## 2017-01-20 PROCEDURE — 83735 ASSAY OF MAGNESIUM: CPT | Performed by: NURSE PRACTITIONER

## 2017-01-20 PROCEDURE — 84132 ASSAY OF SERUM POTASSIUM: CPT | Performed by: NURSE PRACTITIONER

## 2017-01-20 PROCEDURE — 36415 COLL VENOUS BLD VENIPUNCTURE: CPT | Performed by: NURSE PRACTITIONER

## 2017-01-20 PROCEDURE — 99152 MOD SED SAME PHYS/QHP 5/>YRS: CPT | Performed by: SPECIALIST

## 2017-01-20 RX ORDER — OXYCODONE AND ACETAMINOPHEN 5; 325 MG/1; MG/1
2 TABLET ORAL
Status: DISCONTINUED | OUTPATIENT
Start: 2017-01-20 | End: 2017-02-15

## 2017-01-20 RX ORDER — SODIUM CHLORIDE 450 MG/100ML
10 INJECTION, SOLUTION INTRAVENOUS CONTINUOUS
Status: DISCONTINUED | OUTPATIENT
Start: 2017-01-20 | End: 2017-01-22

## 2017-01-20 RX ORDER — DIPHENHYDRAMINE HCL 25 MG
25 CAPSULE ORAL
Status: DISCONTINUED | OUTPATIENT
Start: 2017-01-20 | End: 2017-02-22 | Stop reason: HOSPADM

## 2017-01-20 RX ORDER — HEPARIN SODIUM 200 [USP'U]/100ML
500 INJECTION, SOLUTION INTRAVENOUS ONCE
Status: COMPLETED | OUTPATIENT
Start: 2017-01-20 | End: 2017-01-20

## 2017-01-20 RX ORDER — LIDOCAINE HYDROCHLORIDE 10 MG/ML
10-30 INJECTION INFILTRATION; PERINEURAL
Status: DISCONTINUED | OUTPATIENT
Start: 2017-01-20 | End: 2017-01-20 | Stop reason: HOSPADM

## 2017-01-20 RX ORDER — LANOLIN ALCOHOL/MO/W.PET/CERES
400 CREAM (GRAM) TOPICAL DAILY
Status: DISCONTINUED | OUTPATIENT
Start: 2017-01-21 | End: 2017-02-22 | Stop reason: HOSPADM

## 2017-01-20 RX ORDER — MORPHINE SULFATE 2 MG/ML
2 INJECTION, SOLUTION INTRAMUSCULAR; INTRAVENOUS
Status: DISCONTINUED | OUTPATIENT
Start: 2017-01-20 | End: 2017-01-31

## 2017-01-20 RX ORDER — SODIUM CHLORIDE 9 MG/ML
75 INJECTION, SOLUTION INTRAVENOUS CONTINUOUS
Status: DISCONTINUED | OUTPATIENT
Start: 2017-01-20 | End: 2017-01-20 | Stop reason: HOSPADM

## 2017-01-20 RX ORDER — DEXTROSE 50 % IN WATER (D50W) INTRAVENOUS SYRINGE
12.5-25 AS NEEDED
Status: CANCELLED | OUTPATIENT
Start: 2017-01-20

## 2017-01-20 RX ORDER — BUMETANIDE 0.25 MG/ML
1 INJECTION INTRAMUSCULAR; INTRAVENOUS 2 TIMES DAILY
Status: DISCONTINUED | OUTPATIENT
Start: 2017-01-20 | End: 2017-01-20

## 2017-01-20 RX ORDER — TRAMADOL HYDROCHLORIDE 50 MG/1
50 TABLET ORAL
Status: CANCELLED | OUTPATIENT
Start: 2017-01-20

## 2017-01-20 RX ORDER — SODIUM CHLORIDE 9 MG/ML
3 INJECTION, SOLUTION INTRAVENOUS CONTINUOUS
Status: DISCONTINUED | OUTPATIENT
Start: 2017-01-20 | End: 2017-02-15

## 2017-01-20 RX ORDER — POTASSIUM CHLORIDE 750 MG/1
40 TABLET, FILM COATED, EXTENDED RELEASE ORAL ONCE
Status: CANCELLED | OUTPATIENT
Start: 2017-01-20 | End: 2017-01-21

## 2017-01-20 RX ORDER — INSULIN LISPRO 100 [IU]/ML
INJECTION, SOLUTION INTRAVENOUS; SUBCUTANEOUS
Status: CANCELLED | OUTPATIENT
Start: 2017-01-20

## 2017-01-20 RX ORDER — INSULIN GLARGINE 100 [IU]/ML
10 INJECTION, SOLUTION SUBCUTANEOUS DAILY
Status: DISCONTINUED | OUTPATIENT
Start: 2017-01-21 | End: 2017-01-24

## 2017-01-20 RX ORDER — ASPIRIN 81 MG/1
81 TABLET ORAL DAILY
Status: DISCONTINUED | OUTPATIENT
Start: 2017-01-21 | End: 2017-02-22 | Stop reason: HOSPADM

## 2017-01-20 RX ORDER — SODIUM CHLORIDE 0.9 % (FLUSH) 0.9 %
5-10 SYRINGE (ML) INJECTION AS NEEDED
Status: DISCONTINUED | OUTPATIENT
Start: 2017-01-20 | End: 2017-02-15

## 2017-01-20 RX ORDER — HEPARIN SODIUM 5000 [USP'U]/ML
5000 INJECTION, SOLUTION INTRAVENOUS; SUBCUTANEOUS EVERY 8 HOURS
Status: DISCONTINUED | OUTPATIENT
Start: 2017-01-20 | End: 2017-01-20

## 2017-01-20 RX ORDER — SODIUM CHLORIDE 0.9 % (FLUSH) 0.9 %
5-10 SYRINGE (ML) INJECTION EVERY 8 HOURS
Status: DISCONTINUED | OUTPATIENT
Start: 2017-01-20 | End: 2017-02-15

## 2017-01-20 RX ORDER — ALBUTEROL SULFATE 0.83 MG/ML
2.5 SOLUTION RESPIRATORY (INHALATION)
Status: DISCONTINUED | OUTPATIENT
Start: 2017-01-20 | End: 2017-02-19

## 2017-01-20 RX ORDER — POTASSIUM CHLORIDE 750 MG/1
40 TABLET, FILM COATED, EXTENDED RELEASE ORAL ONCE
Status: COMPLETED | OUTPATIENT
Start: 2017-01-20 | End: 2017-01-20

## 2017-01-20 RX ORDER — VANCOMYCIN 1.75 GRAM/500 ML IN 0.9 % SODIUM CHLORIDE INTRAVENOUS
1750 ONCE
Status: COMPLETED | OUTPATIENT
Start: 2017-01-20 | End: 2017-01-21

## 2017-01-20 RX ORDER — MILRINONE LACTATE 0.2 MG/ML
0.3 INJECTION, SOLUTION INTRAVENOUS CONTINUOUS
Status: DISCONTINUED | OUTPATIENT
Start: 2017-01-20 | End: 2017-01-31

## 2017-01-20 RX ORDER — SODIUM CHLORIDE 0.9 % (FLUSH) 0.9 %
SYRINGE (ML) INJECTION
Status: COMPLETED
Start: 2017-01-20 | End: 2017-01-20

## 2017-01-20 RX ORDER — LANOLIN ALCOHOL/MO/W.PET/CERES
400 CREAM (GRAM) TOPICAL DAILY
Status: CANCELLED | OUTPATIENT
Start: 2017-01-21

## 2017-01-20 RX ORDER — LANOLIN ALCOHOL/MO/W.PET/CERES
400 CREAM (GRAM) TOPICAL DAILY
Status: DISCONTINUED | OUTPATIENT
Start: 2017-01-20 | End: 2017-01-20 | Stop reason: HOSPADM

## 2017-01-20 RX ORDER — FAMOTIDINE 10 MG/ML
20 INJECTION INTRAVENOUS
Status: DISCONTINUED | OUTPATIENT
Start: 2017-01-20 | End: 2017-01-20 | Stop reason: HOSPADM

## 2017-01-20 RX ORDER — TRAMADOL HYDROCHLORIDE 50 MG/1
50 TABLET ORAL
Status: DISCONTINUED | OUTPATIENT
Start: 2017-01-20 | End: 2017-01-21 | Stop reason: SDUPTHER

## 2017-01-20 RX ORDER — HEPARIN SODIUM 5000 [USP'U]/ML
5000 INJECTION, SOLUTION INTRAVENOUS; SUBCUTANEOUS EVERY 8 HOURS
Status: CANCELLED | OUTPATIENT
Start: 2017-01-20

## 2017-01-20 RX ORDER — INSULIN LISPRO 100 [IU]/ML
INJECTION, SOLUTION INTRAVENOUS; SUBCUTANEOUS ONCE
Status: COMPLETED | OUTPATIENT
Start: 2017-01-20 | End: 2017-01-20

## 2017-01-20 RX ORDER — ASPIRIN 81 MG/1
81 TABLET ORAL DAILY
Status: CANCELLED | OUTPATIENT
Start: 2017-01-21

## 2017-01-20 RX ORDER — SODIUM CHLORIDE 0.9 % (FLUSH) 0.9 %
5-10 SYRINGE (ML) INJECTION EVERY 8 HOURS
Status: DISCONTINUED | OUTPATIENT
Start: 2017-01-20 | End: 2017-01-31

## 2017-01-20 RX ORDER — POTASSIUM CHLORIDE 7.45 MG/ML
10 INJECTION INTRAVENOUS
Status: DISPENSED | OUTPATIENT
Start: 2017-01-20 | End: 2017-01-20

## 2017-01-20 RX ORDER — CARVEDILOL 3.12 MG/1
3.12 TABLET ORAL 2 TIMES DAILY WITH MEALS
Status: DISCONTINUED | OUTPATIENT
Start: 2017-01-20 | End: 2017-01-20

## 2017-01-20 RX ORDER — HEPARIN SODIUM 10000 [USP'U]/100ML
12-25 INJECTION, SOLUTION INTRAVENOUS
Status: DISCONTINUED | OUTPATIENT
Start: 2017-01-20 | End: 2017-01-31

## 2017-01-20 RX ORDER — SODIUM CHLORIDE 0.9 % (FLUSH) 0.9 %
5-10 SYRINGE (ML) INJECTION EVERY 8 HOURS
Status: DISCONTINUED | OUTPATIENT
Start: 2017-01-20 | End: 2017-01-20 | Stop reason: HOSPADM

## 2017-01-20 RX ORDER — SODIUM CHLORIDE 0.9 % (FLUSH) 0.9 %
5-10 SYRINGE (ML) INJECTION AS NEEDED
Status: DISCONTINUED | OUTPATIENT
Start: 2017-01-20 | End: 2017-01-20 | Stop reason: HOSPADM

## 2017-01-20 RX ORDER — INSULIN GLARGINE 100 [IU]/ML
10 INJECTION, SOLUTION SUBCUTANEOUS DAILY
Status: CANCELLED | OUTPATIENT
Start: 2017-01-21

## 2017-01-20 RX ORDER — SODIUM CHLORIDE 9 MG/ML
75 INJECTION, SOLUTION INTRAVENOUS CONTINUOUS
Status: CANCELLED | OUTPATIENT
Start: 2017-01-20 | End: 2017-01-20

## 2017-01-20 RX ORDER — BUMETANIDE 0.25 MG/ML
1 INJECTION INTRAMUSCULAR; INTRAVENOUS 2 TIMES DAILY
Status: CANCELLED | OUTPATIENT
Start: 2017-01-20

## 2017-01-20 RX ORDER — ACETAMINOPHEN 325 MG/1
650 TABLET ORAL
Status: DISCONTINUED | OUTPATIENT
Start: 2017-01-20 | End: 2017-02-22 | Stop reason: HOSPADM

## 2017-01-20 RX ORDER — SODIUM CHLORIDE 0.9 % (FLUSH) 0.9 %
5-10 SYRINGE (ML) INJECTION AS NEEDED
Status: CANCELLED | OUTPATIENT
Start: 2017-01-20

## 2017-01-20 RX ORDER — IBUPROFEN 200 MG
1 TABLET ORAL EVERY 24 HOURS
Status: DISCONTINUED | OUTPATIENT
Start: 2017-01-21 | End: 2017-01-31

## 2017-01-20 RX ORDER — CARVEDILOL 3.12 MG/1
3.12 TABLET ORAL 2 TIMES DAILY WITH MEALS
Status: CANCELLED | OUTPATIENT
Start: 2017-01-20

## 2017-01-20 RX ORDER — ACETAMINOPHEN 325 MG/1
650 TABLET ORAL
Status: CANCELLED | OUTPATIENT
Start: 2017-01-20

## 2017-01-20 RX ORDER — MAGNESIUM SULFATE 100 %
4 CRYSTALS MISCELLANEOUS AS NEEDED
Status: CANCELLED | OUTPATIENT
Start: 2017-01-20

## 2017-01-20 RX ORDER — POTASSIUM CHLORIDE 750 MG/1
40 TABLET, FILM COATED, EXTENDED RELEASE ORAL ONCE
Status: DISCONTINUED | OUTPATIENT
Start: 2017-01-20 | End: 2017-01-20 | Stop reason: HOSPADM

## 2017-01-20 RX ORDER — DIPHENHYDRAMINE HYDROCHLORIDE 50 MG/ML
25 INJECTION, SOLUTION INTRAMUSCULAR; INTRAVENOUS
Status: DISCONTINUED | OUTPATIENT
Start: 2017-01-20 | End: 2017-01-20 | Stop reason: HOSPADM

## 2017-01-20 RX ORDER — FENTANYL CITRATE 50 UG/ML
25-200 INJECTION, SOLUTION INTRAMUSCULAR; INTRAVENOUS
Status: DISCONTINUED | OUTPATIENT
Start: 2017-01-20 | End: 2017-01-20 | Stop reason: HOSPADM

## 2017-01-20 RX ORDER — BUMETANIDE 0.25 MG/ML
2 INJECTION INTRAMUSCULAR; INTRAVENOUS 2 TIMES DAILY
Status: DISCONTINUED | OUTPATIENT
Start: 2017-01-20 | End: 2017-01-20

## 2017-01-20 RX ORDER — AMIODARONE HYDROCHLORIDE 200 MG/1
400 TABLET ORAL EVERY 12 HOURS
Status: CANCELLED | OUTPATIENT
Start: 2017-01-20

## 2017-01-20 RX ORDER — CEFAZOLIN SODIUM IN 0.9 % NACL 2 G/50 ML
2 INTRAVENOUS SOLUTION, PIGGYBACK (ML) INTRAVENOUS
Status: CANCELLED | OUTPATIENT
Start: 2017-01-20

## 2017-01-20 RX ORDER — IBUPROFEN 200 MG
1 TABLET ORAL DAILY
Status: CANCELLED | OUTPATIENT
Start: 2017-01-21

## 2017-01-20 RX ORDER — CHLORHEXIDINE GLUCONATE 1.2 MG/ML
15 RINSE ORAL EVERY 12 HOURS
Status: CANCELLED | OUTPATIENT
Start: 2017-01-20

## 2017-01-20 RX ORDER — HYDROCORTISONE SODIUM SUCCINATE 100 MG/2ML
100 INJECTION, POWDER, FOR SOLUTION INTRAMUSCULAR; INTRAVENOUS
Status: DISCONTINUED | OUTPATIENT
Start: 2017-01-20 | End: 2017-01-20 | Stop reason: HOSPADM

## 2017-01-20 RX ORDER — MAGNESIUM SULFATE 1 G/100ML
1 INJECTION INTRAVENOUS ONCE
Status: COMPLETED | OUTPATIENT
Start: 2017-01-21 | End: 2017-01-21

## 2017-01-20 RX ORDER — MAGNESIUM SULFATE 100 %
4 CRYSTALS MISCELLANEOUS AS NEEDED
Status: DISCONTINUED | OUTPATIENT
Start: 2017-01-20 | End: 2017-02-15 | Stop reason: SDUPTHER

## 2017-01-20 RX ORDER — POTASSIUM CHLORIDE 750 MG/1
40 TABLET, FILM COATED, EXTENDED RELEASE ORAL
Status: COMPLETED | OUTPATIENT
Start: 2017-01-20 | End: 2017-01-20

## 2017-01-20 RX ORDER — MIDAZOLAM HYDROCHLORIDE 1 MG/ML
.5-1 INJECTION, SOLUTION INTRAMUSCULAR; INTRAVENOUS
Status: DISCONTINUED | OUTPATIENT
Start: 2017-01-20 | End: 2017-01-20 | Stop reason: HOSPADM

## 2017-01-20 RX ORDER — SODIUM CHLORIDE 0.9 % (FLUSH) 0.9 %
5-10 SYRINGE (ML) INJECTION EVERY 8 HOURS
Status: CANCELLED | OUTPATIENT
Start: 2017-01-20

## 2017-01-20 RX ORDER — INSULIN LISPRO 100 [IU]/ML
INJECTION, SOLUTION INTRAVENOUS; SUBCUTANEOUS
Status: DISCONTINUED | OUTPATIENT
Start: 2017-01-20 | End: 2017-02-15

## 2017-01-20 RX ORDER — SODIUM CHLORIDE 0.9 % (FLUSH) 0.9 %
5-10 SYRINGE (ML) INJECTION AS NEEDED
Status: DISCONTINUED | OUTPATIENT
Start: 2017-01-20 | End: 2017-01-31

## 2017-01-20 RX ADMIN — LIDOCAINE HYDROCHLORIDE 20 ML: 10 INJECTION, SOLUTION INFILTRATION; PERINEURAL at 10:07

## 2017-01-20 RX ADMIN — Medication 2 MG: at 18:07

## 2017-01-20 RX ADMIN — MAGNESIUM GLUCONATE 500 MG ORAL TABLET 400 MG: 500 TABLET ORAL at 08:53

## 2017-01-20 RX ADMIN — SODIUM CHLORIDE 9 ML/HR: 900 INJECTION, SOLUTION INTRAVENOUS at 21:46

## 2017-01-20 RX ADMIN — Medication 10 ML: at 17:58

## 2017-01-20 RX ADMIN — VANCOMYCIN HYDROCHLORIDE 1750 MG: 10 INJECTION, POWDER, LYOPHILIZED, FOR SOLUTION INTRAVENOUS at 19:02

## 2017-01-20 RX ADMIN — Medication 10 ML: at 17:57

## 2017-01-20 RX ADMIN — HEPARIN SODIUM 1000 UNITS: 200 INJECTION, SOLUTION INTRAVENOUS at 09:52

## 2017-01-20 RX ADMIN — OXYCODONE HYDROCHLORIDE AND ACETAMINOPHEN 2 TABLET: 5; 325 TABLET ORAL at 21:17

## 2017-01-20 RX ADMIN — ASPIRIN 81 MG: 81 TABLET, COATED ORAL at 08:53

## 2017-01-20 RX ADMIN — MILRINONE LACTATE 0.38 MCG/KG/MIN: 200 INJECTION, SOLUTION INTRAVENOUS at 21:15

## 2017-01-20 RX ADMIN — MIDAZOLAM HYDROCHLORIDE 1 MG: 1 INJECTION, SOLUTION INTRAMUSCULAR; INTRAVENOUS at 10:06

## 2017-01-20 RX ADMIN — Medication 10 ML: at 22:47

## 2017-01-20 RX ADMIN — IOPAMIDOL 75 ML: 755 INJECTION, SOLUTION INTRAVENOUS at 10:27

## 2017-01-20 RX ADMIN — MIDAZOLAM HYDROCHLORIDE 1 MG: 1 INJECTION, SOLUTION INTRAMUSCULAR; INTRAVENOUS at 10:10

## 2017-01-20 RX ADMIN — Medication 10 ML: at 13:08

## 2017-01-20 RX ADMIN — FENTANYL CITRATE 25 MCG: 50 INJECTION, SOLUTION INTRAMUSCULAR; INTRAVENOUS at 10:19

## 2017-01-20 RX ADMIN — FENTANYL CITRATE 50 MCG: 50 INJECTION, SOLUTION INTRAMUSCULAR; INTRAVENOUS at 10:01

## 2017-01-20 RX ADMIN — SODIUM CHLORIDE 1 MG/HR: 900 INJECTION, SOLUTION INTRAVENOUS at 21:51

## 2017-01-20 RX ADMIN — POTASSIUM CHLORIDE 40 MEQ: 750 TABLET, FILM COATED, EXTENDED RELEASE ORAL at 08:54

## 2017-01-20 RX ADMIN — INSULIN LISPRO 2 UNITS: 100 INJECTION, SOLUTION INTRAVENOUS; SUBCUTANEOUS at 19:32

## 2017-01-20 RX ADMIN — POTASSIUM CHLORIDE 10 MEQ: 10 INJECTION, SOLUTION INTRAVENOUS at 08:59

## 2017-01-20 RX ADMIN — INSULIN LISPRO 1 UNITS: 100 INJECTION, SOLUTION INTRAVENOUS; SUBCUTANEOUS at 22:56

## 2017-01-20 RX ADMIN — POTASSIUM CHLORIDE 40 MEQ: 750 TABLET, FILM COATED, EXTENDED RELEASE ORAL at 17:57

## 2017-01-20 RX ADMIN — FENTANYL CITRATE 25 MCG: 50 INJECTION, SOLUTION INTRAMUSCULAR; INTRAVENOUS at 10:07

## 2017-01-20 RX ADMIN — HEPARIN SODIUM AND DEXTROSE 12 UNITS/KG/HR: 10000; 5 INJECTION INTRAVENOUS at 21:34

## 2017-01-20 RX ADMIN — SODIUM CHLORIDE 75 ML/HR: 900 INJECTION, SOLUTION INTRAVENOUS at 10:46

## 2017-01-20 RX ADMIN — INSULIN GLARGINE 10 UNITS: 100 INJECTION, SOLUTION SUBCUTANEOUS at 08:58

## 2017-01-20 RX ADMIN — CARVEDILOL 3.12 MG: 3.12 TABLET, FILM COATED ORAL at 08:53

## 2017-01-20 RX ADMIN — MIDAZOLAM HYDROCHLORIDE 1 MG: 1 INJECTION, SOLUTION INTRAMUSCULAR; INTRAVENOUS at 10:19

## 2017-01-20 RX ADMIN — Medication 10 ML: at 17:56

## 2017-01-20 NOTE — PROGRESS NOTES
Problem: Diabetes Self-Management  Goal: *Developing strategies to promote health/change behavior  Outcome: Progressing Towards Goal  Diet modifications  Goal: *Prevention, detection, treatment of acute complications  List symptoms of hyper- and hypoglycemia; describe how to treat low blood sugar and actions for lowering high blood glucose level.    Outcome: Progressing Towards Goal  Hyper/Hypoglycemia Signs and symptoms  Goal: *Developing strategies to address psychosocial issues  Describe feelings about living with diabetes; identify support needed and support network   Outcome: Progressing Towards Goal  Family assistance    Problem: Falls - Risk of  Goal: *Absence of falls  Outcome: Progressing Towards Goal  Informed to call for assistance

## 2017-01-20 NOTE — H&P
[facility] Consultation Note      NAME:  Mickey Castle. :   1950   MRN:   [id number]   PCP:  None  CARD:   Dr. Michaelle Castillo    Date:  2017     Fernanda Thornton. is a 61 y.o. male with a who presents for further evaluation of severe CAD and systolic heart failure. Subjective:   He was initially seen at [address] [place] [address] 3 years ago and told that he had heart failure with LVEF 30%. He was lost to follow up due to lack of insurance and has not been seen by a physician in the interim. He complains of progressive fatigue, NAVARRO, PND, and edema over the past year, prompting presentation to [facility]. He also complains of lower extremity bullae, weeping, and pain. He denies palpitations, presyncope, syncope, or chest pain. On admission to [facility], he was found to have diabetes with Hga1c 13.5 and severe native CAD on heart cath by Dr. Michaelle Castillo. He was transferred to [facility] for consideration of high risk surgical revascularization. 30+ minutes spent reviewing imaging and labs prior to the patient interview and examination. General Review of Systems: No nausea, indigestion, vomiting, pain, cough, sputum. No bleeding. No new neurological symptoms. Taking po. Appetite normal. All other systems are negative except as noted in the HPI. Objective:     Visit Vitals    /71 (BP 1 Location: Right arm)    Pulse 93    Temp 98.3 °F (36.8 °C)    Resp 20    SpO2 95%          General:  fatigued    HEENT: Normocephalic, EOMI, PERRLA, Hearing intact, trachea mid-line    Neck:  supple, no significant adenopathy, carotids upstroke normal bilaterally, no bruits    CVP:  12  cm  ( ++ ) HJR    Heart:  Diminished PMI    Normal S1 and S2, S3 gallop    Murmur: 2/6 systolic murmur    Lungs: rales bilaterally    Abdomen: soft, non-tender.  Bowel sounds normal. +HSM    Extremity: Warm, red on the right lower extremity with 4+ pitting edema bilaterally    Neuro: Alert and oriented to person, place, and time; normal strength and tone. Normal symmetric reflexes. Normal coordination and gait         O2 Flow Rate (L/min): 3 l/min O2 Device: Nasal cannula  Temp (24hrs), Av.2 °F (36.8 °C), Min:97.6 °F (36.4 °C), Max:98.5 °F (36.9 °C)        Care Plan discussed with:    Comments   Patient x    Family  x    RN x    Care Manager                    Consultant:          Past History:     Past Medical History   Diagnosis Date    Cardiomyopathy ([address] 2017     A. Echo (17):  EF 5-10% with severe GHK,. Mildly dil LA. Mild TR. PASP 46. No past surgical history on file. Social History   Substance Use Topics    Smoking status: Not on file    Smokeless tobacco: Not on file    Alcohol use Not on file        No family history on file. Allergies:   No Known Allergies       Data Review:     CXR:   CXR Results  (Last 48 hours)               17 2230  XR CHEST PA LAT Final result    Impression:  Impression: Small left effusion with lingular and left lower lobe infiltrates. Narrative:  Exam:  2 view chest       Indication: Chest pain, shortness of breath, lower extremity edema       PA and lateral views demonstrate normal heart size. There is a small left   pleural effusion with left lower lobe and lingular infiltrates. The osseous   structures are unremarkable.                    Echocardiogram:   Echo Results  (Last 48 hours)               17 0000  2D ECHO COMPLETE ADULT (TTE) W OR WO CONTR Final result    Narrative:  [address]   [address] [facility], [id number] [place] [address] [place]   [phone]       Transthoracic Echocardiogram       Patient: Linden Cagle   MRN: [id number]   ACCT #: [de-identified]   : 1950   Age: 61 years   Gender: Male   Height: 69 in   Weight: 169.6 lb   BSA: 1.93 m squared   BP: 110 / 76 mmHg   Study date: 2017   Status: Routine   Location: Emergency department   [facility] #: [id number]       Allergies: NO KNOWN ALLERGIES       Technician: Sarika Vargas [facility]   Ordering Physician:  Dr. Manisha Macedo   Reading Group:  *CAV Group   Reading Physician:  Giovanni Lyons MD       SUMMARY:   Procedure information: Intravenous contrast (Definity) was administered to   opacify the left ventricle. Left ventricle: The ventricle was moderately dilated. Systolic function   was severely reduced. Ejection fraction was estimated in the range of 5 %   to 10 %. There was severe diffuse hypokinesis. Left atrium: The atrium was mildly dilated. Tricuspid valve: There was mild regurgitation. There was mild pulmonary   hypertension. Inferior vena cava, hepatic veins: The inferior vena cava was dilated. The   respirophasic change in diameter was less than 50%. Pericardium: There was a left pleural effusion. INDICATIONS: New onset heart failure       HISTORY: Prior history: Bilateral LL edema, SOB, Acute renal failure, DM       PROCEDURE: This was a routine study. The study included complete 2D   imaging, M-mode, complete spectral Doppler, and color Doppler. The heart   rate was 102 bpm, at the start of the study. Systolic blood pressure was   110 mmHg, at the start of the study. Diastolic blood pressure was 76 mmHg,   at the start of the study. Intravenous contrast (Definity) was   administered to opacify the left ventricle. This was a technically   difficult study. LEFT VENTRICLE: The ventricle was moderately dilated. Systolic function   was severely reduced. Ejection fraction was estimated in the range of 5 %   to 10 %. There was severe diffuse hypokinesis. Wall thickness was normal.       RIGHT VENTRICLE: The size was normal.       LEFT ATRIUM: The atrium was mildly dilated. RIGHT ATRIUM: Size was normal.       MITRAL VALVE: Normal valve structure. There was normal leaflet separation. DOPPLER: The transmitral velocity was within the normal range. There was   no evidence for stenosis. There was no regurgitation. AORTIC VALVE: The valve was trileaflet. Leaflets exhibited normal   thickness and normal cuspal separation. DOPPLER: Transaortic velocity was   within the normal range. There was no stenosis. There was no regurgitation. TRICUSPID VALVE: Normal valve structure. There was normal leaflet   separation. DOPPLER: The transtricuspid velocity was within the normal   range. There was no evidence for tricuspid stenosis. There was mild   regurgitation. Pulmonary artery systolic pressure: 46 mmHg. There was mild   pulmonary hypertension. PULMONIC VALVE: Leaflets exhibited normal thickness, no calcification, and   normal cuspal separation. DOPPLER: The transpulmonic velocity was within   the normal range. There was no regurgitation. AORTA: The root exhibited normal size. SYSTEMIC VEINS: IVC: The inferior vena cava was dilated. The respirophasic   change in diameter was less than 50%. PERICARDIUM: There was no pericardial effusion. There was a left pleural   effusion. SYSTEM MEASUREMENT TABLES       2D   LVOT Diam: 2.1 cm   Ao Diam: 3.6 cm   LA Diam: 4 cm   IVSd: 1 cm   LVIDd: 5.8 cm   LVIDs: 5.3 cm   LVPWd: 0.9 cm   SV(Teich): 32.1 ml   RVIDd: 4.1 cm   LAESV Index (A-L): 44.2 ml/m2       CW   AV Vmax: 1 m/s   AV maxPG: 3.7 mmHg   TR Vmax: 2.6 m/s   TR maxP.2 mmHg   RAP: 8 mmHg       PW   LVOT Vmax: 0.5 m/s   RVSP: 36.2 mmHg       Prepared and E-signed by       Clover Santiago MD   Signed 2017 12:33:32                 No results found for this visit on 17. ECG:  EKG: ST with occasional PVC, NSIVCD, LAE    LABS:  Recent Results (from the past 24 hour(s))   GLUCOSE, POC    Collection Time: 17  9:17 PM   Result Value Ref Range    Glucose (POC) 182 (H) 65 - 100 mg/dL    Performed by Bianka Garcia    CBC W/O DIFF    Collection Time: 17  4:50 AM   Result Value Ref Range    WBC 7.0 4.1 - 11.1 K/uL    RBC 5.27 4. 10 - 5.70 M/uL    HGB 13.3 12.1 - 17.0 g/dL    HCT 39.5 36.6 - 50.3 %    MCV 75.0 (L) 80.0 - 99.0 FL    MCH 25.2 (L) 26.0 - 34.0 PG    MCHC 33.7 30.0 - 36.5 g/dL    RDW 15.6 (H) 11.5 - 14.5 %    PLATELET 760 332 - 470 K/uL   MAGNESIUM    Collection Time: 01/20/17  4:50 AM   Result Value Ref Range    Magnesium 1.6 1.6 - 2.4 mg/dL   METABOLIC PANEL, BASIC    Collection Time: 01/20/17  4:50 AM   Result Value Ref Range    Sodium 128 (L) 136 - 145 mmol/L    Potassium 2.9 (L) 3.5 - 5.1 mmol/L    Chloride 91 (L) 97 - 108 mmol/L    CO2 24 21 - 32 mmol/L    Anion gap 13 5 - 15 mmol/L    Glucose 151 (H) 65 - 100 mg/dL    BUN 45 (H) 6 - 20 MG/DL    Creatinine 2.12 (H) 0.70 - 1.30 MG/DL    BUN/Creatinine ratio 21 (H) 12 - 20      GFR est AA 38 (L) >60 ml/min/1.73m2    GFR est non-AA 32 (L) >60 ml/min/1.73m2    Calcium 8.7 8.5 - 10.1 MG/DL   GLUCOSE, POC    Collection Time: 01/20/17  7:28 AM   Result Value Ref Range    Glucose (POC) 156 (H) 65 - 100 mg/dL    Performed by [employer]    GLUCOSE, POC    Collection Time: 01/20/17 11:40 AM   Result Value Ref Range    Glucose (POC) 197 (H) 65 - 100 mg/dL    Performed by "[employer]    GLUCOSE, POC    Collection Time: 01/20/17  6:30 PM   Result Value Ref Range    Glucose (POC) 229 (H) 65 - 100 mg/dL    Performed by Channnig Henley          Medications reviewed:    Current Facility-Administered Medications   Medication Dose Route Frequency    sodium chloride (NS) flush 5-10 mL  5-10 mL InterCATHeter Q8H    sodium chloride (NS) flush 5-10 mL  5-10 mL InterCATHeter PRN    acetaminophen (TYLENOL) tablet 650 mg  650 mg Oral Q6H PRN    [START ON 1/21/2017] aspirin delayed-release tablet 81 mg  81 mg Oral DAILY    glucagon (GLUCAGEN) injection 1 mg  1 mg IntraMUSCular PRN    glucose chewable tablet 16 g  4 Tab Oral PRN    [START ON 1/21/2017] insulin glargine (LANTUS) injection 10 Units  10 Units SubCUTAneous DAILY    insulin lispro (HUMALOG) injection   SubCUTAneous AC&HS    [START ON 1/21/2017] magnesium oxide (MAG-OX) tablet 400 mg  400 mg Oral DAILY    [START ON 1/21/2017] nicotine (NICODERM CQ) 21 mg/24 hr patch 1 Patch  1 Patch TransDERmal Q24H    traMADol (ULTRAM) tablet 50 mg  50 mg Oral Q6H PRN    0.9% sodium chloride infusion  9 mL/hr IntraVENous CONTINUOUS    0.45% sodium chloride infusion  10 mL/hr IntraVENous CONTINUOUS    sodium chloride (NS) flush 5-10 mL  5-10 mL IntraVENous Q8H    sodium chloride (NS) flush 5-10 mL  5-10 mL IntraVENous PRN    albuterol (PROVENTIL VENTOLIN) nebulizer solution 2.5 mg  2.5 mg Nebulization Q4H PRN    heparin 25,000 units in D5W 250 ml infusion  12-25 Units/kg/hr IntraVENous TITRATE    morphine injection 2 mg  2 mg IntraVENous Q4H PRN    diphenhydrAMINE (BENADRYL) capsule 25 mg  25 mg Oral QHS PRN    oxyCODONE-acetaminophen (PERCOCET) 5-325 mg per tablet 2 Tab  2 Tab Oral Q4H PRN    milrinone (PRIMACOR) 20 MG/100 ML D5W infusion  0.375 mcg/kg/min IntraVENous CONTINUOUS    bumetanide (BUMEX) 12.5 mg in 0.9% sodium chloride 100 mL infusion  1 mg/hr IntraVENous CONTINUOUS    ELECTROLYTE REPLACEMENT PROTOCOL  1 Each Other PRN    vancomycin dosing per pharmacy  1 Each Other Rx Dosing/Monitoring    vancomycin (VANCOCIN) 1750 mg in  ml infusion  1,750 mg IntraVENous ONCE    insulin lispro (HUMALOG) injection   SubCUTAneous ONCE           IMPRESSION:   1. Acute on chronic systolic heart failure (ICM) - Stage D, NYHA Class IV  2. Severe native coronary artery disease  3. Diabetes Mellitus  4. History of tobacco abuse  5. Cellulitis  6. CHACORTA on CKD3  7. Pulmonary HTN       PLAN:   1. Start IV milrinone 0.3 mcg/kg/min and IV bumex 1 mg/hour, Follow I/O, Replete electrolytes, place Jaquelin Antione catheter for hemodynamic assessment. Hold ACE-I due to CHACORTA on CKD. Low dose BB   2. Start ASA, statin, low dose BB  3. Myocardial viability assessment once hemodynamically stable. 4. Start lantus and sliding scale insulin, accuchecks, diabetic education  5.  Schedule PFTs, carotid ultrasound, ABIs to further assess risk for surgical revascularization  6. IV vancomycin and consult infectious disease  7. Smoking cessation counseling                 Thank you for letting me see him with you,    Dorie Landis MD, [facility], [address] [place] [address], [address] [place] [address], [address] [place] [address]  Office: [phone]  Fax: [phone]  24 hour VAD/HF Pager: [phone]

## 2017-01-20 NOTE — WOUND CARE
Wound Care Consult:  Initial inpatient wound and skin consult. Patient is alert and oriented and sitting up in bed, wife at bedside. Per patient and wife his legs are much improved since admission. Assessment:  All skin folds and bony prominences assessed and no impairment noted. Bilateral lower legs with dark red chronic discoloration, dry and flaky skin. Right medial lower leg with clustered area of full thickness wounds, wound base with yellow/tan slough and constantine wound intact. No drainage or odor. Left leg medial knee with small area of weeping serous fluid, partial thickness wound. Left foot, 2nd-4th toes with dry scabbed areas. Bilateral heels dry and intact no redness noted. Buttock is red, blanches. No open areas of skin. Treatment:  Right leg wounds covered with medi honey gel and mepilex foam boarder. Left leg partial thickness wound covered with mepilex foam boarder. [place] waffle cushion given for offloading while sitting in chair. Betadine to 2nd-4th toes on left foot. Encouraged patient to turn Q2 hrs and prn.       Flash Sands RN

## 2017-01-20 NOTE — PROGRESS NOTES
Pharmacist Note - Vancomycin Dosing    Consult provided for this 61 y.o. male for indication of skin and soft tissue infection. Antibiotic regimen(s): none at this time    Recent Labs      17   0450  17   2311   WBC  7.0  8.5   CREA  2.12*  2.33*   BUN  45*  40*     Frequency of BMP: daily  Height: 177 cm  Weight: 81 kg  Est CrCl: 36 ml/min  Temp (24hrs), Av.2 °F (36.8 °C), Min:97.6 °F (36.4 °C), Max:98.5 °F (36.9 °C)    Cultures:pending    Goal trough = ~15    Therapy will be initiated with a loading dose of 1750 mg IV x 1   Maintenance dose will be every 24 hours. New dose after am labs. Pharmacy to follow patient daily and order levels / make dose adjustments as appropriate.

## 2017-01-20 NOTE — DISCHARGE SUMMARY
Physician Discharge Summary     Patient ID:  Ryan William  [id number]  61 y.o.  1950    Admit date: 1/18/2017    Discharge date and time: 1/20/2017    Admission Diagnoses: Congestive heart failure [facility])    Discharge Diagnoses:    Principal Diagnosis   Acute systolic (congestive) heart failure ([facility]                                             Other Diagnoses    Bilateral lower extremity edema (1/19/2017)    Shortness of breath (1/19/2017)    Acute renal failure (ARF) ([facility] (1/19/2017)    Hyperglycemia due to type 2 diabetes mellitus ([facility] (1/19/2017)    Venous stasis dermatitis of both lower extremities (1/19/2017)    Hypoxia (1/19/2017)    Pulmonary edema (1/19/2017)    Sinus tachycardia (1/19/2017)    Ischemic cardiomyopathy (1/20/2017)    Hospital Course:   Acute systolic (congestive) heart failure / Sinus tachycardia / Ischemic cardiomyopathy - ECHO showed \"The ventricle was moderately dilated. Systolic function was severely reduced. Ejection fraction was estimated in the range of 5 % to 10 %. There was severe diffuse hypokinesis. \" [address] cardiology consult. Cath today showed severe 3 vessel disease without option for stent. Will Dc to [facility] for cardiac surgery. Diuresis with bumex as tolerated, but IVf post cath. Started ASA, coreg     Pulmonary edema / Shortness of breath / Hypoxia - POA due to CHF. Oxygen as needed. Will need 6 minute walk prior to discharge.     Acute renal failure - POA, unclear chronicity, likely related to DM, CHF, NSAID, HCTZ. Renal consulted, but they do not think any RRT needed. Monitor     Hyperglycemia due to type 2 diabetes mellitus, with vascular and renal complications - Diabetic/CHF diet and counseling. SSI per protocol. Start Lantus. A1c over 13.     Bilateral lower extremity edema / Venous stasis dermatitis of both lower extremities - Supportive care.  Wound consult     Tobacco abuse - Nicoderm patch.       PCP: None    Consults: Cardiology, Nephrology and Cardiac Surgery    Significant Diagnostic Studies: See [facility] Course    Discharged [facility] in stable condition. Discharge Exam:   BP 96/68    Pulse 83    Temp 98.3 °F (36.8 °C)    Resp 18    Ht 5' 9.5\" (1.765 m)    Wt 81 kg (178 lb 9.2 oz)    SpO2 94%    BMI 25.99 kg/m2      Gen: Well-developed, well-nourished, in mild acute distress  HEENT: Pink conjunctivae, PERRL, hearing intact to voice, moist mucous membranes  Neck: Supple, without masses, thyroid non-tender  Resp: No accessory muscle use, clear breath sounds with rales  Card: No murmurs, tachycardic S1, S2 without thrills, bruits, 1+ peripheral edema  Abd: Soft, non-tender, non-distended, normoactive bowel sounds are present, no mass  Lymph: No cervical or inguinal adenopathy  Musc: No cyanosis or clubbing  Skin: Bilateral chronic stasis dermatitis, skin turgor is good  Neuro: Cranial nerves are grossly intact, mild motor weakness, follows commands appropriately  Psych: Moderate insight, oriented to person, place and time, alert    Patient Instructions:   Current Discharge Medication List      STOP taking these medications       ibuprofen (ADVIL) 200 mg tablet Comments:   Reason for Stopping:         hydroCHLOROthiazide (HYDRODIURIL) 25 mg tablet Comments:   Reason for Stopping:             Activity: Activity as tolerated and See surgical instructions  Diet: Cardiac Diet and Low fat, Low cholesterol  Wound Care: Reinforce dressing PRN and As directed    Follow-up with your PCP and cardiology in a few weeks.   Follow-up tests/labs - none    Signed:  Harriett Guzman MD  1/20/2017  11:46 AM

## 2017-01-20 NOTE — DIABETES MGMT
DTC: Pt remains on consult list even after completion. Discussed with Mason Blair RN. Confirmed no additional needs assessed. Asked Katlin Villalba to have Mr. Guevara People continue self injection of insulin to continue reinforcement of site selection and technique. Caden Peacock.  KATHRIN Foley, [address] [place] [address]   [phone] (office)  [phone] (pager)

## 2017-01-20 NOTE — IP AVS SNAPSHOT
[address] [facility] [address] 
[phone] Patient: Beatrice Marcial MRN: [id number] OVF:[id number] You are allergic to the following Allergen Reactions Heparin (Porcine) Unknown (comments) Recent Documentation Height 1.753 m Unresulted Labs Order Current Status HCV RNA ANDRES QUALITATIVE In process Emergency Contacts Name Discharge Info Relation Home Work Mobile Gin Salinas     [phone] About your hospitalization You were admitted on:  January 20, 2017 You last received care in the:  [facility] 4 CV SERVICES UNIT You were discharged on:  February 22, 2017 Unit phone number:  [phone] Why you were hospitalized Your primary diagnosis was:  Not on File Your diagnoses also included:  Systolic Heart Failure (Hcc) Providers Seen During Your Hospitalizations Provider Role Specialty Primary office phone Florence Brown MD Attending Provider Cardiothoracic Surgery [phone] Your Primary Care Physician (PCP) Primary Care Physician Office Phone Office Fax NONE ** None ** ** None ** Follow-up Information Follow up With Details Comments Contact Info Esdras Elkins MD On 3/6/2017 Appointment scheduled for 1:40 pm [address] [place] [address] [phone] [address] [place] [address] 
[phone] None   None ([phone]) Patient stated that they have no PCP [address] [facility] On 2/24/2017 11 am [facility] [address] 
[phone] [facility] 227 On 2/23/2017 skilled nursing services, please call if you have not heard from agency by noon 921 [facility] [address] [phone] [facility]: [address]  home infusion services [address] 
[phone] Your Appointments Thursday February 23, 2017 To Be Determined START OF CARE with Ester Varela RN  
[facility] [address] [facility] ([address]) [address] [facility] ([address]) Monday March 06, 2017  1:40 PM EST HOSPITAL DISCHARGE with Tricia Hernandez MD  
[facility] ([facility] SCHEDULING) [address] [place] [address] 0619 [facility]  
[phone] Current Discharge Medication List  
  
START taking these medications Dose & Instructions Dispensing Information Comments Morning Noon Evening Bedtime  
 amiodarone 200 mg tablet Commonly known as:  CORDARONE Your next dose is: Today, Tomorrow Other:  _________ Dose:  200 mg Take 1 Tab by mouth every twelve (12) hours. Quantity:  60 Tab Refills:  1  
     
   
   
   
  
 aspirin delayed-release 81 mg tablet Your next dose is: Today, Tomorrow Other:  _________ Dose:  81 mg Take 1 Tab by mouth daily. Quantity:  30 Tab Refills:  1  
     
   
   
   
  
 atorvastatin 10 mg tablet Commonly known as:  LIPITOR Your next dose is: Today, Tomorrow Other:  _________ Dose:  10 mg Take 1 Tab by mouth daily. Quantity:  30 Tab Refills:  1  
     
   
   
   
  
 bumetanide 2 mg tablet Commonly known as:  Janie Hove Your next dose is: Today, Tomorrow Other:  _________ Dose:  2 mg Take 1 Tab by mouth two (2) times a day. Quantity:  60 Tab Refills:  1  
     
   
   
   
  
 carvedilol 3.125 mg tablet Commonly known as:  Llana Milo Your next dose is: Today, Tomorrow Other:  _________ Dose:  3.125 mg Take 1 Tab by mouth two (2) times daily (with meals). Quantity:  60 Tab Refills:  1  
     
   
   
   
  
 clopidogrel 75 mg Tab Commonly known as:  PLAVIX Your next dose is: Today, Tomorrow Other:  _________ Dose:  75 mg Take 1 Tab by mouth daily. Quantity:  30 Tab Refills:  1  
     
   
   
   
  
 gabapentin 100 mg capsule Commonly known as:  NEURONTIN Your next dose is: Today, Tomorrow Other:  _________ Dose:  100 mg Take 1 Cap by mouth two (2) times a day. Quantity:  60 Cap Refills:  1  
     
   
   
   
  
 insulin NPH/insulin regular 100 unit/mL (70-30) injection Commonly known as:  NOVOLIN 70/30, HUMULIN 70/30 Your next dose is: Today, Tomorrow Other:  _________ 10 units w/ breakfast 8 units w/ dinner Quantity:  10 mL Refills:  2 Insulin Syringes (Disposable) 1 mL Syrg Your next dose is: Today, Tomorrow Other:  _________ Pt to take 10 units w/ breakfast and 8 units w/ dinner Quantity:  500 Syringe Refills:  1  
     
   
   
   
  
 magnesium oxide 400 mg tablet Commonly known as:  MAG-OX Your next dose is: Today, Tomorrow Other:  _________ Dose:  400 mg Take 1 Tab by mouth daily. Quantity:  30 Tab Refills:  1  
     
   
   
   
  
 milrinone 20 mg/100 mL (200 mcg/mL) infusion Commonly known as:  Yaw Banjessicater Your next dose is: Today, Tomorrow Other:  _________ Dose:  0.375 mcg/kg/min 28.425 mcg/min by IntraVENous route continuous. Quantity:  100 mL Refills:  1  
     
   
   
   
  
 nicotine 14 mg/24 hr patch Commonly known as:  Raffi Morale Your next dose is: Today, Tomorrow Other:  _________ Dose:  1 Patch 1 Patch by TransDERmal route daily for 30 days. Quantity:  30 Patch Refills:  1  
     
   
   
   
  
 spironolactone 25 mg tablet Commonly known as:  ALDACTONE Your next dose is: Today, Tomorrow Other:  _________ Dose:  25 mg Take 1 Tab by mouth daily. Quantity:  30 Tab Refills:  1 Where to Get Your Medications Information on where to get these meds will be given to you by the nurse or doctor. ! Ask your nurse or doctor about these medications  
  amiodarone 200 mg tablet  
 aspirin delayed-release 81 mg tablet  
 atorvastatin 10 mg tablet  
 bumetanide 2 mg tablet  
 carvedilol 3.125 mg tablet  
 clopidogrel 75 mg Tab  
 gabapentin 100 mg capsule  
 insulin NPH/insulin regular 100 unit/mL (70-30) injection Insulin Syringes (Disposable) 1 mL Syrg  
 magnesium oxide 400 mg tablet  
 milrinone 20 mg/100 mL (200 mcg/mL) infusion  
 nicotine 14 mg/24 hr patch  
 spironolactone 25 mg tablet Discharge Instructions [address] 
                                                [phone] Name: Prosper Ribeiro. Procedures: SVO2 swan placed via RFV Impella placed via LFA 
PCI performed via RFA 
   
Successful MIKE pRCA: 2.5x38 Xience + 2.75x12 Xience overlapping. Successful MIKE ost left main: 4.0x12 Xience post-dil with 4.5x12 NC. By Dr. Mcwilliams Shell 
  
Removal percutaneous ventricular  
assist device (Impella pump) at separate and distinct session from  
insertion (CPT CODE 29078) on 2/14/17 by Dr. Jessica Shelton 
  
Colonoscopy and EGT on 2/17/17 by Dr. Pascual Bush 
  
Capsule Study 2/21/17 
   
 
Discharge Date: No discharge date for patient encounter. MEDICATIONS: 
Please see your After Visit Summary for a list of medications. INSTRUCTIONS: 
1. NO SMOKING, NO ALCOHOL, NO DRUGS 2. Weigh yourself each morning. Call if you gain more than 2 pounds in a one day period or 5 pounds in a one week period. ACTIVITY No restrictions FOLLOW UP 
1.  You have a follow up appointment in the [place] [address] on Friday, February 24, 2016 at 11:00am.  Our address is [address] [place] [address], [address] Please contact [phone] if this appointment does not work for you 2. You will need to schedule a follow up appointment with Dr. Coleen Brand - on March 6, 2017 at 1:40pm.  Please call [phone] if this doesn't work for you. 3. If you have any issues, please call [phone] 
a. During office hours, call [phone] 
b. After hours or on weekends, please page [phone] 4. Please contact your primary care physician to make sure your pneumococcal vaccine and flu vaccines are up to date. 5. PLEASE bring a list of all medications you are currently taking with you to your follow up appointments. Signature:___________________________________________________ Discharge Instructions Attachments/References HEART FAILURE: AVOIDING TRIGGERS (ENGLISH) Discharge Orders None Introducing \[facility]\"" & [facility] SERVICES! [facility] introduces Directly patient portal. Now you can access parts of your medical record, email your doctor's office, and request medication refills online. 1. In your internet browser, go to [email or web address] 2. Click on the First Time User? Click Here link in the Sign In box. You will see the New Member Sign Up page. 3. Enter your Directly Access Code exactly as it appears below. You will not need to use this code after youve completed the sign-up process. If you do not sign up before the expiration date, you must request a new code. · Directly Access Code: [email or web address] Expires: 4/18/2017 10:53 PM 
 
4. Enter the last four digits of your Social Security Number (xxxx) and Date of Birth (mm/dd/yyyy) as indicated and click Submit. You will be taken to the next sign-up page. 5. Create a Directly ID. This will be your Directly login ID and cannot be changed, so think of one that is secure and easy to remember. 6. Create a [employer] password. You can change your password at any time. 7. Enter your Password Reset Question and Answer. This can be used at a later time if you forget your password. 8. Enter your e-mail address. You will receive e-mail notification when new information is available in [address] 9. Click Sign Up. You can now view and download portions of your medical record. 10. Click the Download Summary menu link to download a portable copy of your medical information. If you have questions, please visit the Frequently Asked Questions section of the [employer] website. Remember, [employer] is NOT to be used for urgent needs. For medical emergencies, dial 911. Now available from your iPhone and Android! General Information Please provide this summary of care documentation to your next provider. Patient Signature:  ____________________________________________________________ Date:  ____________________________________________________________  
  
Sara Collado Provider Signature:  ____________________________________________________________ Date:  ____________________________________________________________ More Information Avoiding Triggers With Heart Failure: Care Instructions Your Care Instructions Triggers are anything that make your heart failure flare up. A flare-up is also called \"sudden heart failure\" or \"acute heart failure. \" When you have a flare-up, fluid builds up in your lungs, and you have problems breathing. You might need to go to the hospital. By watching for changes in your condition and avoiding triggers, you can prevent heart failure flare-ups. Follow-up care is a key part of your treatment and safety. Be sure to make and go to all appointments, and call your doctor if you are having problems. It's also a good idea to know your test results and keep a list of the medicines you take. How can you care for yourself at home? Watch for changes in your weight and condition · Weigh yourself without clothing at the same time each day. Record your weight. Call your doctor if you gain 3 pounds or more in 2 to 3 days. A sudden weight gain may mean that your heart failure is getting worse. · Keep a daily record of your symptoms. Write down any changes in how you feel, such as new shortness of breath, cough, or problems eating. Also record if your ankles are more swollen than usual and if you have to urinate in the night more often. Note anything that you ate or did that could have triggered these changes. Limit sodium Sodium causes your body to hold on to water, making it harder for your heart to pump. People get most of their sodium from processed foods. Fast food and restaurant meals also tend to be very high in sodium. · Your doctor may suggest that you limit sodium to 2,000 milligrams (mg) a day or less. That is less than 1 teaspoon of salt a day, including all the salt you eat in cooking or in packaged foods. · Read food labels on cans and food packages. They tell you how much sodium you get in one serving. Check the serving size. If you eat more than one serving, you are getting more sodium. · Be aware that sodium can come in forms other than salt, including monosodium glutamate (MSG), sodium citrate, and sodium bicarbonate (baking soda). MSG is often added to Asian food. You can sometimes ask for food without MSG or salt. · Slowly reducing salt will help you adjust to the taste. Take the salt shaker off the table. · Flavor your food with garlic, lemon juice, onion, vinegar, herbs, and spices instead of salt. Do not use soy sauce, steak sauce, onion salt, garlic salt, mustard, or ketchup on your food, unless it is labeled \"low-sodium\" or \"low-salt. \" 
· Make your own salad dressings, sauces, and ketchup without adding salt.  
· Use fresh or frozen ingredients, instead of canned ones, whenever you can. Choose low-sodium canned goods. · Eat less processed food and food from restaurants, including fast food. Exercise as directed Moderate, regular exercise is very good for your heart. It improves your blood flow and helps control your weight. But too much exercise can stress your heart and cause a heart failure flare-up. · Check with your doctor before you start an exercise program. 
· Walking is an easy way to get exercise. Start out slowly. Gradually increase the length and pace of your walk. Swimming, riding a bike, and using a treadmill are also good forms of exercise. · When you exercise, watch for signs that your heart is working too hard. You are pushing yourself too hard if you cannot talk while you are exercising. If you become short of breath or dizzy or have chest pain, stop, sit down, and rest. 
· Do not exercise when you do not feel well. Take medicines correctly · Take your medicines exactly as prescribed. Call your doctor if you think you are having a problem with your medicine. · Make a list of all the medicines you take. Include those prescribed to you by other doctors and any over-the-counter medicines, vitamins, or supplements you take. Take this list with you when you go to any doctor. · Take your medicines at the same time every day. It may help you to post a list of all the medicines you take every day and what time of day you take them. · Make taking your medicine as simple as you can. Plan times to take your medicines when you are doing other things, such as eating a meal or getting ready for bed. This will make it easier to remember to take your medicines. · Get organized. Use helpful tools, such as daily or weekly pill containers. When should you call for help? Call 911 if you have symptoms of sudden heart failure such as: 
· You have severe trouble breathing. · You cough up pink, foamy mucus. · You have a new irregular or rapid heartbeat. Call your doctor now or seek immediate medical care if: 
· You have new or increased shortness of breath. · You are dizzy or lightheaded, or you feel like you may faint. · You have sudden weight gain, such as 3 pounds or more in 2 to 3 days. · You have increased swelling in your legs, ankles, or feet. · You are suddenly so tired or weak that you cannot do your usual activities. Watch closely for changes in your health, and be sure to contact your doctor if you develop new symptoms. Where can you learn more? Go to http://erik-dianna.info/. Enter L560 in the search box to learn more about \"Avoiding Triggers With Heart Failure: Care Instructions. \" Current as of: April 27, 2016 Content Version: 11.1 © 0724-6251 [employer]. Care instructions adapted under license by [employer] (which disclaims liability or warranty for this information). If you have questions about a medical condition or this instruction, always ask your healthcare professional. [address] any warranty or liability for your use of this information.

## 2017-01-20 NOTE — IP AVS SNAPSHOT
Current Discharge Medication List  
  
Take these medications at their scheduled times Dose & Instructions Dispensing Information Comments Morning Noon Evening Bedtime  
 amiodarone 200 mg tablet Commonly known as:  CORDARONE Your next dose is: Today, Tomorrow Other:  ____________ Dose:  200 mg Take 1 Tab by mouth every twelve (12) hours. Quantity:  60 Tab Refills:  1  
     
   
   
   
  
 aspirin delayed-release 81 mg tablet Your next dose is: Today, Tomorrow Other:  ____________ Dose:  81 mg Take 1 Tab by mouth daily. Quantity:  30 Tab Refills:  1  
     
   
   
   
  
 atorvastatin 10 mg tablet Commonly known as:  LIPITOR Your next dose is: Today, Tomorrow Other:  ____________ Dose:  10 mg Take 1 Tab by mouth daily. Quantity:  30 Tab Refills:  1  
     
   
   
   
  
 bumetanide 2 mg tablet Commonly known as:  Shirleyann Li Your next dose is: Today, Tomorrow Other:  ____________ Dose:  2 mg Take 1 Tab by mouth two (2) times a day. Quantity:  60 Tab Refills:  1  
     
   
   
   
  
 carvedilol 3.125 mg tablet Commonly known as:  Marcella Gravel Switch Your next dose is: Today, Tomorrow Other:  ____________ Dose:  3.125 mg Take 1 Tab by mouth two (2) times daily (with meals). Quantity:  60 Tab Refills:  1  
     
   
   
   
  
 clopidogrel 75 mg Tab Commonly known as:  PLAVIX Your next dose is: Today, Tomorrow Other:  ____________ Dose:  75 mg Take 1 Tab by mouth daily. Quantity:  30 Tab Refills:  1  
     
   
   
   
  
 gabapentin 100 mg capsule Commonly known as:  NEURONTIN Your next dose is: Today, Tomorrow Other:  ____________ Dose:  100 mg Take 1 Cap by mouth two (2) times a day. Quantity:  60 Cap Refills:  1  
     
   
   
   
  
 magnesium oxide 400 mg tablet Commonly known as:  MAG-OX Your next dose is: Today, Tomorrow Other:  ____________ Dose:  400 mg Take 1 Tab by mouth daily. Quantity:  30 Tab Refills:  1  
     
   
   
   
  
 nicotine 14 mg/24 hr patch Commonly known as:  Yoko Justice Your next dose is: Today, Tomorrow Other:  ____________ Dose:  1 Patch 1 Patch by TransDERmal route daily for 30 days. Quantity:  30 Patch Refills:  1  
     
   
   
   
  
 spironolactone 25 mg tablet Commonly known as:  ALDACTONE Your next dose is: Today, Tomorrow Other:  ____________ Dose:  25 mg Take 1 Tab by mouth daily. Quantity:  30 Tab Refills:  1 Take these medications as directed Dose & Instructions Dispensing Information Comments Morning Noon Evening Bedtime  
 insulin NPH/insulin regular 100 unit/mL (70-30) injection Commonly known as:  NOVOLIN 70/30, HUMULIN 70/30 Your next dose is: Today, Tomorrow Other:  ____________ 10 units w/ breakfast 8 units w/ dinner Quantity:  10 mL Refills:  2 Insulin Syringes (Disposable) 1 mL Syrg Your next dose is: Today, Tomorrow Other:  ____________ Pt to take 10 units w/ breakfast and 8 units w/ dinner Quantity:  500 Syringe Refills:  1  
     
   
   
   
  
 milrinone 20 mg/100 mL (200 mcg/mL) infusion Commonly known as:  Christine School Your next dose is: Today, Tomorrow Other:  ____________ Dose:  0.375 mcg/kg/min 28.425 mcg/min by IntraVENous route continuous. Quantity:  100 mL Refills:  1 Where to Get Your Medications Information about where to get these medications is not yet available ! Ask your nurse or doctor about these medications  
  amiodarone 200 mg tablet  
 aspirin delayed-release 81 mg tablet  
 atorvastatin 10 mg tablet bumetanide 2 mg tablet  
 carvedilol 3.125 mg tablet  
 clopidogrel 75 mg Tab  
 gabapentin 100 mg capsule  
 insulin NPH/insulin regular 100 unit/mL (70-30) injection Insulin Syringes (Disposable) 1 mL Syrg  
 magnesium oxide 400 mg tablet  
 milrinone 20 mg/100 mL (200 mcg/mL) infusion  
 nicotine 14 mg/24 hr patch  
 spironolactone 25 mg tablet

## 2017-01-20 NOTE — PROGRESS NOTES
1- CASE MANAGEMENT NOTE:  I met briefly with the pt and his wife, Lesvia Amaral ([phone]), to introduce myself and explain the role of case management. It was confirmed that the pt and his wife live in a one story house and he is independent with his ADL's and drives on a limited basis. He does not have a PCP and I gave them a list of [facility]  PCP's. The pt anticipates returning home today and his wife will transport him. Care Management Interventions  PCP Verified by CM:  Yes  Transition of Care Consult (CM Consult): Discharge Planning  Physical Therapy Consult: Yes  Occupational Therapy Consult: Yes  Current Support Network: Lives with Spouse  Plan discussed with Pt/Family/Caregiver: Yes  Discharge Location  Discharge Placement: Home    LYNN Ricardo, CM

## 2017-01-20 NOTE — PROGRESS NOTES
Creatinine stable. Cath today at 10am.  The risks (including but not limited to death, myocardial infarction, cerebrovascular accident, dysrhythmia, renal failure, vascular complication, allergy, and/or need for emergency surgery), benefits, and alternatives have been explained. Verbal informed consent has been obtained.

## 2017-01-20 NOTE — PROGRESS NOTES
1036 TRANSFER - IN REPORT:    Verbal report received from [employer](stephen) on Fernanda Esteban.  being received from cath(unit) for routine progression of care      Report consisted of patients Situation, Background, Assessment and   Recommendations(SBAR). Information from the following report(s) Procedure Summary was reviewed with the receiving nurse. Opportunity for questions and clarification was provided. Assessment completed upon patients arrival to unit and care assumed. 1042 Blood aspirated from sheath then Art and Venious sheath pulled 6 & 7 Fr R Groin. Judieth Kareen applied. Manual pressure held by Ivett Barbosa RN. 1052 Hemostasis achieved at 1052. Dressing applied. Pt voices understanding of post procedure bedrest instructions. 0 Dr Sofia Shaw at bedside speaking with patient. Looked for wife in waiting room to update. Not found    11:26 AM TRANSFER - OUT REPORT:    Verbal report given to Petty(stephen) on Fernanda Esteban.  being transferred to 318(unit) for routine progression of care       Report consisted of patients Situation, Background, Assessment and   Recommendations(SBAR). Information from the following report(s) SBAR, Kardex, Procedure Summary, Intake/Output, MAR and Cardiac Rhythm SR wqrs PVC was reviewed with the receiving nurse.     Lines:   Peripheral IV 01/18/17 Right Forearm (Active)   Site Assessment Clean, dry, & intact 1/20/2017 10:57 AM   Phlebitis Assessment 0 1/20/2017  7:45 AM   Infiltration Assessment 0 1/20/2017  7:45 AM   Dressing Status Clean, dry, & intact 1/20/2017  7:45 AM   Dressing Type Transparent 1/20/2017  7:45 AM   Hub Color/Line Status Pink;Capped;Flushed 1/20/2017  7:45 AM   Alcohol Cap Used Yes 1/20/2017  7:45 AM       Peripheral IV 01/20/17 Right Forearm (Active)   Site Assessment Clean, dry, & intact 1/20/2017 10:57 AM   Phlebitis Assessment 0 1/20/2017  9:48 AM   Dressing Status Clean, dry, & intact 1/20/2017  9:48 AM   Dressing Type Transparent 1/20/2017  9:48 AM        Opportunity for questions and clarification was provided.       Patient transported with:   Monitor  Registered Nurse

## 2017-01-20 NOTE — PROCEDURES
Cath (1/20/17):  LM ost70. LAD m50. D1 80. LCx d70; OM1 99, OM2 90. RCA p100. No AVG. PAP  53/27/36. Severe 3VD. CTSx consult for ? CABG. If not, med mgmt.

## 2017-01-20 NOTE — PROGRESS NOTES
Signed and held orders are for [facility] admission. Please do not release! Thanks.

## 2017-01-20 NOTE — PROGRESS NOTES
Sudhakar Shanon [address] [place] [address]  [address] [facility], [address] [place] [address]  [phone]      Medical Progress Note      NAME: Cassandra Robb. :  1950  MRM:  [id number]    Date/Time: 2017  11:42 AM       Assessment and Plan:     Acute systolic (congestive) heart failure / Sinus tachycardia - ECHO showed \"The ventricle was moderately dilated. Systolic function was severely reduced. Ejection fraction was estimated in the range of 5 % to 10 %. There was severe diffuse hypokinesis. \" [address] cardiology consult. Cath today showed severe 3 vessel disease without option for stent. Will Dc to [facility] for cardiac surgery. Diuresis with bumex as tolerated, but IVf post cath. Started ASA, coreg    Pulmonary edema / Shortness of breath / Hypoxia - POA due to CHF. Oxygen as needed. Will need 6 minute walk prior to discharge. Acute renal failure - POA, unclear chronicity, likely related to DM, CHF, NSAID, HCTZ. Renal consulted, but they do not think any RRT needed. Monitor    Hyperglycemia due to type 2 diabetes mellitus, with vascular and renal complications - Diabetic/CHF diet and counseling. SSI per protocol. Start Lantus. A1c over 13. Bilateral lower extremity edema / Venous stasis dermatitis of both lower extremities - Supportive care. Wound consult    Tobacco abuse - Nicoderm patch. Subjective:     Chief Complaint:  Tolerated cath with mild dyspnea    ROS:  (bold if positive, if negative)    SOB/NAVARRO  Tolerating some PT  NPO        Objective:     Last 24hrs VS reviewed since prior progress note.  Most recent are:    Visit Vitals    BP 96/68    Pulse 83    Temp 98.3 °F (36.8 °C)    Resp 18    Ht 5' 9.5\" (1.765 m)    Wt 81 kg (178 lb 9.2 oz)    SpO2 94%    BMI 25.99 kg/m2     SpO2 Readings from Last 6 Encounters:   17 94%    O2 Flow Rate (L/min): 2 l/min       Intake/Output Summary (Last 24 hours) at 17 8936  Last data filed at 17 5461   Gross per 24 hour   Intake              340 ml   Output              675 ml   Net             -335 ml        Physical Exam:    Gen:  Well-developed, well-nourished, in mild acute distress  HEENT:  Pink conjunctivae, PERRL, hearing intact to voice, moist mucous membranes  Neck:  Supple, without masses, thyroid non-tender  Resp:  No accessory muscle use, clear breath sounds with rales  Card:  No murmurs, tachycardic S1, S2 without thrills, bruits, 1+ peripheral edema  Abd:  Soft, non-tender, non-distended, normoactive bowel sounds are present, no mass  Lymph:  No cervical or inguinal adenopathy  Musc:  No cyanosis or clubbing  Skin:  Bilateral chronic stasis dermatitis, skin turgor is good  Neuro:  Cranial nerves are grossly intact, mild motor weakness, follows commands appropriately  Psych:   Moderate insight, oriented to person, place and time, alert    Telemetry reviewed:   normal sinus rhythm  __________________________________________________________________  Medications Reviewed: (see below)  Medications:     Current Facility-Administered Medications   Medication Dose Route Frequency    magnesium oxide (MAG-OX) tablet 400 mg  400 mg Oral DAILY    sodium chloride (NS) flush 5-10 mL  5-10 mL IntraVENous Q8H    sodium chloride (NS) flush 5-10 mL  5-10 mL IntraVENous PRN    diphenhydrAMINE (BENADRYL) injection 25 mg  25 mg IntraVENous ONCE PRN    famotidine (PF) (PEPCID) injection 20 mg  20 mg IntraVENous ONCE PRN    hydrocortisone Sod Succ (PF) (SOLU-CORTEF) injection 100 mg  100 mg IntraVENous ONCE PRN    0.9% sodium chloride infusion  75 mL/hr IntraVENous CONTINUOUS    potassium chloride SR (KLOR-CON 10) tablet 40 mEq  40 mEq Oral ONCE    bumetanide (BUMEX) injection 1 mg  1 mg IntraVENous BID    aspirin delayed-release tablet 81 mg  81 mg Oral DAILY    glucose chewable tablet 16 g  4 Tab Oral PRN    dextrose (D50W) injection syrg 12.5-25 g  12.5-25 g IntraVENous PRN    glucagon (GLUCAGEN) injection 1 mg  1 mg IntraMUSCular PRN    insulin lispro (HUMALOG) injection   SubCUTAneous AC&HS    carvedilol (COREG) tablet 3.125 mg  3.125 mg Oral BID WITH MEALS    insulin glargine (LANTUS) injection 10 Units  10 Units SubCUTAneous DAILY    nicotine (NICODERM CQ) 21 mg/24 hr patch 1 Patch  1 Patch TransDERmal DAILY    acetaminophen (TYLENOL) tablet 650 mg  650 mg Oral Q6H PRN    diphenhydrAMINE (BENADRYL) capsule 25 mg  25 mg Oral Q6H PRN    heparin (porcine) injection 5,000 Units  5,000 Units SubCUTAneous Q8H    ondansetron (ZOFRAN) injection 6 mg  6 mg IntraVENous Q6H PRN    HYDROmorphone (PF) (DILAUDID) injection 1 mg  1 mg IntraVENous Q4H PRN    Or    HYDROmorphone (PF) (DILAUDID) injection 2 mg  2 mg IntraVENous Q4H PRN    traMADol (ULTRAM) tablet 50 mg  50 mg Oral Q6H PRN        Lab Data Reviewed: (see below)  Lab Review:     Recent Labs      01/20/17   0450  01/18/17   2311   WBC  7.0  8.5   HGB  13.3  13.6   HCT  39.5  40.8   PLT  233  254     Recent Labs      01/20/17   0450  01/18/17   2311   NA  128*  129*   K  2.9*  3.5   CL  91*  90*   CO2  24  28   GLU  151*  491*   BUN  45*  40*   CREA  2.12*  2.33*   CA  8.7  8.8   MG  1.6  1.6   ALB   --   2.9*   TBILI   --   0.9   SGOT   --   10*   ALT   --   13     Lab Results   Component Value Date/Time    Glucose (POC) 156 01/20/2017 07:28 AM    Glucose (POC) 182 01/19/2017 09:17 PM    Glucose (POC) 127 01/19/2017 04:36 PM    Glucose (POC) 200 01/19/2017 11:30 AM    Glucose (POC) 423 01/19/2017 08:01 AM     No results for input(s): PH, PCO2, PO2, HCO3, FIO2 in the last 72 hours. No results for input(s): INR in the last 72 hours.     No lab exists for component: Kit Just  All Micro Results     Procedure Component Value Units Date/Time    CULTURE, URINE [[id number]] Collected:  01/19/17 0135    Order Status:  Completed Specimen:  Urine Updated:  01/20/17 1003     Special Requests: NO SPECIAL REQUESTS        [place] Count --        <10,000  COLONIES/mL Culture result: NO SIGNIFICANT GROWTH             I have reviewed notes of prior 24hr.     Other pertinent lab: none    Total time spent with patient: 79Wang Coronado discussed with: Patient, Family, Care Manager, Nursing Staff, Consultant/Specialist and >50% of time spent in counseling and coordination of care    Discussed:  Care Plan and D/C Planning    Prophylaxis:  H2B/PPI    Disposition:  Home w/Family           ___________________________________________________    Attending Physician: Fatoumata Galvan MD

## 2017-01-20 NOTE — PROGRESS NOTES
[address]. YOB: 1950          Assessment & Plan:   ARF/CKD   · Baseline Cr unknown  · Poor medical f/u  · Cr a bit better today. At risk of ARF due to cath etc    CMP  · Severely reduced LVEF    CAD  · MVCAD, to transfer to [facility] for consideration of CABG    DM  · Previously untreated       Subjective:   CC: f/u CKD  HPI: Renal function a bit better today. Cath showed MVCAD and he is going to [facility] for poss CABG.   ROS: no n/v/sob  Current Facility-Administered Medications   Medication Dose Route Frequency    magnesium oxide (MAG-OX) tablet 400 mg  400 mg Oral DAILY    sodium chloride (NS) flush 5-10 mL  5-10 mL IntraVENous Q8H    sodium chloride (NS) flush 5-10 mL  5-10 mL IntraVENous PRN    diphenhydrAMINE (BENADRYL) injection 25 mg  25 mg IntraVENous ONCE PRN    famotidine (PF) (PEPCID) injection 20 mg  20 mg IntraVENous ONCE PRN    hydrocortisone Sod Succ (PF) (SOLU-CORTEF) injection 100 mg  100 mg IntraVENous ONCE PRN    0.9% sodium chloride infusion  75 mL/hr IntraVENous CONTINUOUS    potassium chloride SR (KLOR-CON 10) tablet 40 mEq  40 mEq Oral ONCE    bumetanide (BUMEX) injection 1 mg  1 mg IntraVENous BID    aspirin delayed-release tablet 81 mg  81 mg Oral DAILY    glucose chewable tablet 16 g  4 Tab Oral PRN    dextrose (D50W) injection syrg 12.5-25 g  12.5-25 g IntraVENous PRN    glucagon (GLUCAGEN) injection 1 mg  1 mg IntraMUSCular PRN    insulin lispro (HUMALOG) injection   SubCUTAneous AC&HS    carvedilol (COREG) tablet 3.125 mg  3.125 mg Oral BID WITH MEALS    insulin glargine (LANTUS) injection 10 Units  10 Units SubCUTAneous DAILY    nicotine (NICODERM CQ) 21 mg/24 hr patch 1 Patch  1 Patch TransDERmal DAILY    acetaminophen (TYLENOL) tablet 650 mg  650 mg Oral Q6H PRN    diphenhydrAMINE (BENADRYL) capsule 25 mg  25 mg Oral Q6H PRN    heparin (porcine) injection 5,000 Units  5,000 Units SubCUTAneous Q8H    ondansetron (ZOFRAN) injection 6 mg  6 mg IntraVENous Q6H PRN    HYDROmorphone (PF) (DILAUDID) injection 1 mg  1 mg IntraVENous Q4H PRN    Or    HYDROmorphone (PF) (DILAUDID) injection 2 mg  2 mg IntraVENous Q4H PRN    traMADol (ULTRAM) tablet 50 mg  50 mg Oral Q6H PRN          Objective:     Vitals:  Blood pressure 104/72, pulse 88, temperature 97.9 °F (36.6 °C), resp. rate 18, height 5' 9.5\" (1.765 m), weight 81 kg (178 lb 9.2 oz), SpO2 98 %. Temp (24hrs), Av.1 °F (36.7 °C), Min:97.6 °F (36.4 °C), Max:98.5 °F (36.9 °C)      Intake and Output:   07 -  190  In: -   Out: 072 [RYMNC:200]  1901 -  0700  In: 340 [P.O.:340]  Out: 785 [Urine:785]    Physical Exam:               GENERAL ASSESSMENT: NAD  CHEST: CTA  HEART: S1S2  ABDOMEN: Soft,NT  EXTREMITY: +EDEMA          ECG/rhythm:    Data Review      No results for input(s): TNIPOC in the last 72 hours. No lab exists for component: ITNL   Recent Labs      17   1743  17   1133  17   2311   CPK   --    --   51   CKMB   --    --   2.9   TROIQ  0.04  <0.04  <0.04     Recent Labs      17   0450  17   2311   NA  128*  129*   K  2.9*  3.5   CL  91*  90*   CO2  24  28   BUN  45*  40*   CREA  2.12*  2.33*   GLU  151*  491*   MG  1.6  1.6   CA  8.7  8.8   ALB   --   2.9*   WBC  7.0  8.5   HGB  13.3  13.6   HCT  39.5  40.8   PLT  233  254      No results for input(s): INR, PTP, APTT in the last 72 hours. No lab exists for component: INREXT  Needs: urine analysis, urine sodium, protein and creatinine  Lab Results   Component Value Date/Time    Sodium urine, random 14 2017 01:35 AM    Creatinine, urine 190.36 2017 01:35 AM         : Lesvia Stovall MD  2017        [facility]:  [email or web address] [email or web address]  Beatriz Dollar office:  [address] [address] [place] [address], [address] [place] [address]  Phone: [phone]  Fax :     [phone] office:  [address] [facility] [place]  Phone - [phone]  Fax - [phone]

## 2017-01-20 NOTE — PROGRESS NOTES
Occupational Therapy order received and acknowledged. Chart reviewed, nursing consulted. Patient off unit in cath lab, will continue to follow and complete evaluation as available/appropriate.

## 2017-01-20 NOTE — PROGRESS NOTES
0715 - Bedside and Verbal shift change report given to Marshall Machado (oncoming nurse) by Jae Keys RN (offgoing nurse). Report included the following information SBAR, Kardex, Intake/Output, Accordion, Recent Results and Cardiac Rhythm NSR. Pt awake in bed. Respirations even and unlabored on RA. No acute distress noted. Pt aware of NPO status for scheduled cath. 0830 - Dr. Dean Ledbetter on floor notified re: pt low K of 2.9. IV and PO potassium received. 12 - Dr. Dean Ledbetter notified re: pt low BP and scheduled IV lasix. Per APRYL MAC to hold this dose. 1125 - TRANSFER - IN REPORT:    Verbal report received from [address] RN(name) on [employer].  being received from cath lab(unit) for routine progression of care      Report consisted of patients Situation, Background, Assessment and   Recommendations(SBAR). Information from the following report(s) SBAR, Kardex, Accordion, Recent Results and Cardiac Rhythm NSR with PVCs was reviewed with the receiving nurse. Opportunity for questions and clarification was provided. Assessment completed upon patients arrival to unit and care assumed. 1140 - Pt arrived back to floor from cath lab. Pt &O x 4. Pt currently on 2L O2 via NC. Wife at bedside. Cath site on R groin. Dressing CD&I. No bleeding or hematoma. Dr. Erin Clemente also in room. R pedal pulse not palpable. Pt R foot warm, able to move and sensation present. Hemostasis achieved at 1052 via clotting pad and manual pressure per WILLIAM Palm. Pt to be supine until 1152. Pt made aware. 1220 - Bed assignment rec'd from [facility]. Pt to got to CVICU room 32. Report given to Rangel Sweeney RN. EMTALA and face sheet faxed for transfer. Awaiting call back re: ETA for transport. 1339 - [facility] ambulance in to take pt to [facility]. Pt in no acute distress. Personal belongings accounted for and taken by wife.

## 2017-01-20 NOTE — PROGRESS NOTES
Physical Therapy:  Patient currently off the floor for a cardiac cath. We will continue to follow and re-attempt later as able.   Thank you  Vickie Alcaraz PT,DPT

## 2017-01-20 NOTE — NURSE NAVIGATOR
Chart reviewed by Heart Failure Nurse Navigator. Heart Failure database completed. Echo  1/19 shows EF 5-10% with LV moderately dilated and severe diffuse hypokinesis. ACEi/ARB: Currently contraindicated with Cr 2.12. BB: Carvedilol 3.125 mg BID. CRT -not indicated at this time. NYHA Functional Class III symptoms on admission with orthopnea, increasing edema, and increasing NAVARRO ongoing for about 2 weeks prior to admission. Heart Failure Teach Back in Patient Education. Heart Failure Avoiding Triggers on Discharge Instructions. Patient stated he was at [address] [place] [address] several years ago with SOB and edema but has not seen a medical provider since then. He is uninsured and per CM note his wife stated government insurance plans were not affordable. Per pharmacy note, medications prior to admission included ibuprofen daily and his wife's HCTZ of 25 mg daily which he started taking about 2 months ago. He was admitted to [facility] with new heart failure, new onset diabetes with HgBA1C of 13.5. Cardiac cath today has shown severe 3 vessel disease not amenable to stents. Cardiac surgery consulted and plan in place to transfer to [facility] for further management.

## 2017-01-20 NOTE — PROGRESS NOTES
SHIFT CHANGE REPORT:  1 Verbal report received from [facility]  Report consisted of patients Situation, Background, Assessment and    Recommendations(SBAR), Kardex, and Rhythm. Opportunity for questions and clarification was provided.       SHIFT SUMMARY:  3527 Pt has now voided 375  0000 Pt NPO  No significant events and no complaints. END OF SHIFT REPORT:  0700 Bedside shift change report given to [facility] (oncoming nurse) by Arianna Maradiaga RN (offgoing nurse).  Report included the following information SBAR, Kardex, Intake/Output, MAR, Recent Results and Cardiac Rhythm SR

## 2017-01-20 NOTE — DISCHARGE INSTRUCTIONS
Learning About Coronary Artery Disease (CAD)  What is coronary artery disease? Coronary artery disease (CAD) occurs when plaque builds up in the arteries that bring oxygen-rich blood to your heart. Plaque is a fatty substance made of cholesterol, calcium, and other substances in the blood. This process is called hardening of the arteries, or atherosclerosis. What happens when you have coronary artery disease? · Plaque may narrow the coronary arteries. Narrowed arteries cause poor blood flow. This can lead to angina symptoms such as chest pain or discomfort. If blood flow is completely blocked, you could have a heart attack. · You can slow CAD and reduce the risk of future problems by making changes in your lifestyle. These include quitting smoking and eating heart-healthy foods. · Treatments for CAD, along with changes in your lifestyle, can help you live a longer and healthier life. How can you prevent coronary artery disease? · Do not smoke. It may be the best thing you can do to prevent heart disease. If you need help quitting, talk to your doctor about stop-smoking programs and medicines. These can increase your chances of quitting for good. · Be active. Get at least 30 minutes of exercise on most days of the week. Walking is a good choice. You also may want to do other activities, such as running, swimming, cycling, or playing tennis or team sports. · Eat heart-healthy foods. Eat more fruits and vegetables and less foods that contain saturated and trans fats. Limit alcohol, sodium, and sweets. · Stay at a healthy weight. Lose weight if you need to. · Manage other health problems such as diabetes, high blood pressure, and high cholesterol. · Manage stress. Stress can hurt your heart. To keep stress low, talk about your problems and feelings. Don't keep your feelings hidden. · If you have talked about it with your doctor, take a low-dose aspirin every day.  Aspirin can help certain people lower their risk of a heart attack or stroke. But taking aspirin isn't right for everyone, because it can cause serious bleeding. Do not start taking daily aspirin unless your doctor knows about it. How is coronary artery disease treated? · Your doctor will suggest that you make lifestyle changes. For example, your doctor may ask you to eat healthy foods, quit smoking, lose extra weight, and be more active. · You will have to take medicines. · Your doctor may suggest a procedure to open narrowed or blocked arteries. This is called angioplasty. Or your doctor may suggest using healthy blood vessels to create detours around narrowed or blocked arteries. This is called bypass surgery. Follow-up care is a key part of your treatment and safety. Be sure to make and go to all appointments, and call your doctor if you are having problems. It's also a good idea to know your test results and keep a list of the medicines you take. Where can you learn more? Go to http://erikCitizenShipperdianna.info/. Enter [id number] in the search box to learn more about \"Learning About Coronary Artery Disease (CAD). \"  Current as of: January 27, 2016  Content Version: 11.1  © 7487-8350 [employer]. Care instructions adapted under license by [employer] (which disclaims liability or warranty for this information). If you have questions about a medical condition or this instruction, always ask your healthcare professional. [place] [address] any warranty or liability for your use of this information. Avoiding Triggers With Heart Failure: Care Instructions  Your Care Instructions  Triggers are anything that make your heart failure flare up. A flare-up is also called \"sudden heart failure\" or \"acute heart failure. \" When you have a flare-up, fluid builds up in your lungs, and you have problems breathing.  You might need to go to the hospital. By watching for changes in your condition and avoiding triggers, you can prevent heart failure flare-ups. Follow-up care is a key part of your treatment and safety. Be sure to make and go to all appointments, and call your doctor if you are having problems. It's also a good idea to know your test results and keep a list of the medicines you take. How can you care for yourself at home? Watch for changes in your weight and condition  · Weigh yourself without clothing at the same time each day. Record your weight. Call your doctor if you gain 3 pounds or more in 2 to 3 days. A sudden weight gain may mean that your heart failure is getting worse. · Keep a daily record of your symptoms. Write down any changes in how you feel, such as new shortness of breath, cough, or problems eating. Also record if your ankles are more swollen than usual and if you have to urinate in the night more often. Note anything that you ate or did that could have triggered these changes. Limit sodium  Sodium causes your body to hold on to water, making it harder for your heart to pump. People get most of their sodium from processed foods. Fast food and restaurant meals also tend to be very high in sodium. · Your doctor may suggest that you limit sodium to 2,000 milligrams (mg) a day or less. That is less than 1 teaspoon of salt a day, including all the salt you eat in cooking or in packaged foods. · Read food labels on cans and food packages. They tell you how much sodium you get in one serving. Check the serving size. If you eat more than one serving, you are getting more sodium. · Be aware that sodium can come in forms other than salt, including monosodium glutamate (MSG), sodium citrate, and sodium bicarbonate (baking soda). MSG is often added to Asian food. You can sometimes ask for food without MSG or salt. · Slowly reducing salt will help you adjust to the taste. Take the salt shaker off the table.   · Flavor your food with garlic, lemon juice, onion, vinegar, herbs, and spices instead of salt. Do not use soy sauce, steak sauce, onion salt, garlic salt, mustard, or ketchup on your food, unless it is labeled \"low-sodium\" or \"low-salt. \"  · Make your own salad dressings, sauces, and ketchup without adding salt. · Use fresh or frozen ingredients, instead of canned ones, whenever you can. Choose low-sodium canned goods. · Eat less processed food and food from restaurants, including fast food. Exercise as directed  Moderate, regular exercise is very good for your heart. It improves your blood flow and helps control your weight. But too much exercise can stress your heart and cause a heart failure flare-up. · Check with your doctor before you start an exercise program.  · Walking is an easy way to get exercise. Start out slowly. Gradually increase the length and pace of your walk. Swimming, riding a bike, and using a treadmill are also good forms of exercise. · When you exercise, watch for signs that your heart is working too hard. You are pushing yourself too hard if you cannot talk while you are exercising. If you become short of breath or dizzy or have chest pain, stop, sit down, and rest.  · Do not exercise when you do not feel well. Take medicines correctly  · Take your medicines exactly as prescribed. Call your doctor if you think you are having a problem with your medicine. · Make a list of all the medicines you take. Include those prescribed to you by other doctors and any over-the-counter medicines, vitamins, or supplements you take. Take this list with you when you go to any doctor. · Take your medicines at the same time every day. It may help you to post a list of all the medicines you take every day and what time of day you take them. · Make taking your medicine as simple as you can. Plan times to take your medicines when you are doing other things, such as eating a meal or getting ready for bed. This will make it easier to remember to take your medicines.   · Get organized. Use helpful tools, such as daily or weekly pill containers. When should you call for help? Call 911 if you have symptoms of sudden heart failure such as:  · You have severe trouble breathing. · You cough up pink, foamy mucus. · You have a new irregular or rapid heartbeat. Call your doctor now or seek immediate medical care if:  · You have new or increased shortness of breath. · You are dizzy or lightheaded, or you feel like you may faint. · You have sudden weight gain, such as 3 pounds or more in 2 to 3 days. · You have increased swelling in your legs, ankles, or feet. · You are suddenly so tired or weak that you cannot do your usual activities. Watch closely for changes in your health, and be sure to contact your doctor if you develop new symptoms. Where can you learn more? Go to http://erik-dianna.info/. Enter C681 in the search box to learn more about \"Avoiding Triggers With Heart Failure: Care Instructions. \"  Current as of: April 27, 2016  Content Version: 11.1  © 5604-8651 [employer]. Care instructions adapted under license by [employer] (which disclaims liability or warranty for this information). If you have questions about a medical condition or this instruction, always ask your healthcare professional. [place] [address] any warranty or liability for your use of this information. Cardiology Follow up with Deyanira Donahue NP on January 27th, 2017 at 9:30am.  [address] [facility]  [address]  [phone]                       Cardiac Catheterization/Angiography Discharge Instructions    *Check the puncture site frequently for swelling or bleeding. If you see any bleeding, lie down and apply pressure over the area with a clean town or washcloth. Notify your doctor for any redness, swelling, drainage or oozing from the puncture site. Notify your doctor for any fever or chills.     *If the leg or arm with the puncture becomes cold, numb or painful, call your physician    *Activity should be limited for the next 48 hours. Climb stairs as little as possible and avoid any stooping, bending or strenuous activity for 48 hours. No heavy lifting (anything over 10 pounds) for three days. *Do not drive for 48 hours. *You may resume your usual diet. Drink more fluids than usual.    *Have a responsible person drive you home and stay with you for at least 24 hours after your heart catheterization/angiography. *You may remove the bandage from your Right in 24 hours. You may shower in 24 hours. No tub baths, hot tubs or swimming for one week. Do not place any lotions, creams, powders, ointments over the puncture site for one week. You may place a clean band-aid over the puncture site each day for 5 days. Change this daily.

## 2017-01-20 NOTE — PROGRESS NOTES
1430- Pt arrived via EMS. No issues noted during transport per EMS. Patient arrived alert and oriented, on 2 liters nasal cannula, nicotine patch on left shoulder and two peripheral IV's on right arm  1435- Primary Nurse Consuelo Cedeño, RN and [facility], RN performed a dual skin assessment on this patient Impairment noted- see wound doc flow sheet  Trey score is 18.  1445- Stand test passed with no dificulties  1640- Spoke with Dr. Parmjit Cisse about orders for patient. Stated that patient needs a swan and arterial line catheters. Stated Paresh Lara NP would be over later to put in the rest of the orders. 1645- Obtained consent from patient. 1647- Anesthesia called. 46- Dr. Katya Martin at bedside. Updated patient and family. Discussed plan of care. [address] [facility] with Aki Lester NP about orders and condition of patient. Stated to not start the Heparin drip until after the placement of swan and arterial line. She will add orders for patient. [address] with Dr. Katya Martin. Stated we will start Bumex, Vanc, Milrinone and Heparin for patient. Patient will need a cardiac MRI next week. 2000- Bedside and Verbal shift change report given to Doctor Kimmie Zendejas (oncoming nurse) by Jaun Paz RN (offgoing nurse). Report included the following information SBAR, OR Summary, Procedure Summary, Intake/Output, Med Rec Status and Cardiac Rhythm NSR with PVC's.

## 2017-01-21 ENCOUNTER — APPOINTMENT (OUTPATIENT)
Dept: GENERAL RADIOLOGY | Age: 67
DRG: 215 | End: 2017-01-21
Attending: NURSE PRACTITIONER
Payer: SELF-PAY

## 2017-01-21 LAB
ALBUMIN SERPL BCP-MCNC: 2.7 G/DL (ref 3.5–5)
ALBUMIN/GLOB SERPL: 0.7 {RATIO} (ref 1.1–2.2)
ALP SERPL-CCNC: 91 U/L (ref 45–117)
ALT SERPL-CCNC: 9 U/L (ref 12–78)
ANION GAP BLD CALC-SCNC: 13 MMOL/L (ref 5–15)
APPEARANCE UR: ABNORMAL
APTT PPP: 31.8 SEC (ref 22.1–32.5)
APTT PPP: 33.8 SEC (ref 22.1–32.5)
APTT PPP: 43.1 SEC (ref 22.1–32.5)
ARTERIAL PATENCY WRIST A: ABNORMAL
AST SERPL W P-5'-P-CCNC: 12 U/L (ref 15–37)
BACTERIA URNS QL MICRO: NEGATIVE /HPF
BASE DEFICIT BLDV-SCNC: 1 MMOL/L
BDY SITE: ABNORMAL
BILIRUB SERPL-MCNC: 0.9 MG/DL (ref 0.2–1)
BNP SERPL-MCNC: 1209 PG/ML (ref 0–100)
BNP SERPL-MCNC: 7635 PG/ML (ref 0–125)
BUN SERPL-MCNC: 45 MG/DL (ref 6–20)
BUN/CREAT SERPL: 25 (ref 12–20)
CALCIUM SERPL-MCNC: 8.5 MG/DL (ref 8.5–10.1)
CHLORIDE SERPL-SCNC: 91 MMOL/L (ref 97–108)
CO2 SERPL-SCNC: 26 MMOL/L (ref 21–32)
COLOR UR: ABNORMAL
CREAT SERPL-MCNC: 1.82 MG/DL (ref 0.7–1.3)
EPITH CASTS URNS QL MICRO: ABNORMAL /LPF
ERYTHROCYTE [DISTWIDTH] IN BLOOD BY AUTOMATED COUNT: 15.6 % (ref 11.5–14.5)
GAS FLOW.O2 O2 DELIVERY SYS: ABNORMAL L/MIN
GAS FLOW.O2 SETTING OXYMISER: 3 L/M
GLOBULIN SER CALC-MCNC: 3.8 G/DL (ref 2–4)
GLUCOSE BLD STRIP.AUTO-MCNC: 189 MG/DL (ref 65–100)
GLUCOSE BLD STRIP.AUTO-MCNC: 267 MG/DL (ref 65–100)
GLUCOSE BLD STRIP.AUTO-MCNC: 292 MG/DL (ref 65–100)
GLUCOSE BLD STRIP.AUTO-MCNC: 311 MG/DL (ref 65–100)
GLUCOSE SERPL-MCNC: 187 MG/DL (ref 65–100)
GLUCOSE UR STRIP.AUTO-MCNC: NEGATIVE MG/DL
HCO3 BLDV-SCNC: 23.8 MMOL/L (ref 23–28)
HCT VFR BLD AUTO: 38.6 % (ref 36.6–50.3)
HGB BLD-MCNC: 12.7 G/DL (ref 12.1–17)
HGB UR QL STRIP: ABNORMAL
KETONES UR QL STRIP.AUTO: NEGATIVE MG/DL
LEUKOCYTE ESTERASE UR QL STRIP.AUTO: NEGATIVE
MAGNESIUM SERPL-MCNC: 1.7 MG/DL (ref 1.6–2.4)
MAGNESIUM SERPL-MCNC: 1.7 MG/DL (ref 1.6–2.4)
MAGNESIUM SERPL-MCNC: 2.3 MG/DL (ref 1.6–2.4)
MCH RBC QN AUTO: 24.7 PG (ref 26–34)
MCHC RBC AUTO-ENTMCNC: 32.9 G/DL (ref 30–36.5)
MCV RBC AUTO: 75.1 FL (ref 80–99)
NITRITE UR QL STRIP.AUTO: NEGATIVE
PCO2 BLDV: 38.8 MMHG (ref 41–51)
PH BLDV: 7.39 [PH] (ref 7.32–7.42)
PH UR STRIP: 5.5 [PH] (ref 5–8)
PLATELET # BLD AUTO: 226 K/UL (ref 150–400)
PO2 BLDV: 30 MMHG (ref 25–40)
POTASSIUM SERPL-SCNC: 3.1 MMOL/L (ref 3.5–5.1)
POTASSIUM SERPL-SCNC: 3.5 MMOL/L (ref 3.5–5.1)
POTASSIUM SERPL-SCNC: 4.1 MMOL/L (ref 3.5–5.1)
PROT SERPL-MCNC: 6.5 G/DL (ref 6.4–8.2)
PROT UR STRIP-MCNC: 100 MG/DL
RBC # BLD AUTO: 5.14 M/UL (ref 4.1–5.7)
RBC #/AREA URNS HPF: ABNORMAL /HPF (ref 0–5)
SAO2 % BLDV: 58 % (ref 65–88)
SERVICE CMNT-IMP: ABNORMAL
SODIUM SERPL-SCNC: 130 MMOL/L (ref 136–145)
SP GR UR REFRACTOMETRY: 1.03 (ref 1–1.03)
SPECIMEN TYPE: ABNORMAL
THERAPEUTIC RANGE,PTTT: ABNORMAL SECS (ref 58–77)
THERAPEUTIC RANGE,PTTT: ABNORMAL SECS (ref 58–77)
THERAPEUTIC RANGE,PTTT: NORMAL SECS (ref 58–77)
UA: UC IF INDICATED,UAUC: ABNORMAL
UROBILINOGEN UR QL STRIP.AUTO: 1 EU/DL (ref 0.2–1)
WBC # BLD AUTO: 8.7 K/UL (ref 4.1–11.1)
WBC URNS QL MICRO: ABNORMAL /HPF (ref 0–4)

## 2017-01-21 PROCEDURE — 82803 BLOOD GASES ANY COMBINATION: CPT

## 2017-01-21 PROCEDURE — 97162 PT EVAL MOD COMPLEX 30 MIN: CPT

## 2017-01-21 PROCEDURE — 65610000003 HC RM ICU SURGICAL

## 2017-01-21 PROCEDURE — 83880 ASSAY OF NATRIURETIC PEPTIDE: CPT | Performed by: THORACIC SURGERY (CARDIOTHORACIC VASCULAR SURGERY)

## 2017-01-21 PROCEDURE — 74011250636 HC RX REV CODE- 250/636: Performed by: THORACIC SURGERY (CARDIOTHORACIC VASCULAR SURGERY)

## 2017-01-21 PROCEDURE — 82962 GLUCOSE BLOOD TEST: CPT

## 2017-01-21 PROCEDURE — 74011000258 HC RX REV CODE- 258: Performed by: NURSE PRACTITIONER

## 2017-01-21 PROCEDURE — 85730 THROMBOPLASTIN TIME PARTIAL: CPT | Performed by: THORACIC SURGERY (CARDIOTHORACIC VASCULAR SURGERY)

## 2017-01-21 PROCEDURE — 83735 ASSAY OF MAGNESIUM: CPT | Performed by: NURSE PRACTITIONER

## 2017-01-21 PROCEDURE — 85730 THROMBOPLASTIN TIME PARTIAL: CPT | Performed by: NURSE PRACTITIONER

## 2017-01-21 PROCEDURE — 80053 COMPREHEN METABOLIC PANEL: CPT | Performed by: NURSE PRACTITIONER

## 2017-01-21 PROCEDURE — 36415 COLL VENOUS BLD VENIPUNCTURE: CPT | Performed by: NURSE PRACTITIONER

## 2017-01-21 PROCEDURE — 74011250637 HC RX REV CODE- 250/637: Performed by: NURSE PRACTITIONER

## 2017-01-21 PROCEDURE — 74011000258 HC RX REV CODE- 258: Performed by: THORACIC SURGERY (CARDIOTHORACIC VASCULAR SURGERY)

## 2017-01-21 PROCEDURE — 74011250636 HC RX REV CODE- 250/636: Performed by: NURSE PRACTITIONER

## 2017-01-21 PROCEDURE — 85027 COMPLETE CBC AUTOMATED: CPT | Performed by: NURSE PRACTITIONER

## 2017-01-21 PROCEDURE — 71010 XR CHEST PORT: CPT

## 2017-01-21 PROCEDURE — 83880 ASSAY OF NATRIURETIC PEPTIDE: CPT | Performed by: NURSE PRACTITIONER

## 2017-01-21 PROCEDURE — 03HB33Z INSERTION OF INFUSION DEVICE INTO RIGHT RADIAL ARTERY, PERCUTANEOUS APPROACH: ICD-10-PCS | Performed by: ANESTHESIOLOGY

## 2017-01-21 PROCEDURE — 74011000250 HC RX REV CODE- 250: Performed by: NURSE PRACTITIONER

## 2017-01-21 PROCEDURE — 84132 ASSAY OF SERUM POTASSIUM: CPT | Performed by: NURSE PRACTITIONER

## 2017-01-21 PROCEDURE — 74011636637 HC RX REV CODE- 636/637: Performed by: NURSE PRACTITIONER

## 2017-01-21 RX ORDER — HEPARIN SODIUM 5000 [USP'U]/ML
4000 INJECTION, SOLUTION INTRAVENOUS; SUBCUTANEOUS ONCE
Status: COMPLETED | OUTPATIENT
Start: 2017-01-21 | End: 2017-01-21

## 2017-01-21 RX ORDER — MAGNESIUM SULFATE 1 G/100ML
1 INJECTION INTRAVENOUS ONCE
Status: COMPLETED | OUTPATIENT
Start: 2017-01-21 | End: 2017-01-21

## 2017-01-21 RX ORDER — POTASSIUM CHLORIDE 29.8 MG/ML
20 INJECTION INTRAVENOUS
Status: COMPLETED | OUTPATIENT
Start: 2017-01-21 | End: 2017-01-21

## 2017-01-21 RX ORDER — LEVOFLOXACIN 5 MG/ML
750 INJECTION, SOLUTION INTRAVENOUS
Status: DISCONTINUED | OUTPATIENT
Start: 2017-01-21 | End: 2017-01-23

## 2017-01-21 RX ORDER — HEPARIN SODIUM 5000 [USP'U]/ML
2000 INJECTION, SOLUTION INTRAVENOUS; SUBCUTANEOUS ONCE
Status: COMPLETED | OUTPATIENT
Start: 2017-01-21 | End: 2017-01-21

## 2017-01-21 RX ORDER — POTASSIUM CHLORIDE 29.8 MG/ML
20 INJECTION INTRAVENOUS
Status: COMPLETED | OUTPATIENT
Start: 2017-01-21 | End: 2017-01-22

## 2017-01-21 RX ORDER — VANCOMYCIN HYDROCHLORIDE
1250 EVERY 24 HOURS
Status: DISCONTINUED | OUTPATIENT
Start: 2017-01-21 | End: 2017-01-22

## 2017-01-21 RX ADMIN — SODIUM CHLORIDE 1 MG/HR: 900 INJECTION, SOLUTION INTRAVENOUS at 08:05

## 2017-01-21 RX ADMIN — Medication 2 MG: at 13:11

## 2017-01-21 RX ADMIN — VASOPRESSIN 0.02 UNITS/MIN: 20 INJECTION INTRAVENOUS at 14:57

## 2017-01-21 RX ADMIN — Medication 81 MG: at 09:19

## 2017-01-21 RX ADMIN — Medication 10 ML: at 05:57

## 2017-01-21 RX ADMIN — HEPARIN SODIUM 2000 UNITS: 5000 INJECTION, SOLUTION INTRAVENOUS; SUBCUTANEOUS at 22:06

## 2017-01-21 RX ADMIN — INSULIN LISPRO 4 UNITS: 100 INJECTION, SOLUTION INTRAVENOUS; SUBCUTANEOUS at 16:37

## 2017-01-21 RX ADMIN — INSULIN GLARGINE 10 UNITS: 100 INJECTION, SOLUTION SUBCUTANEOUS at 09:18

## 2017-01-21 RX ADMIN — SODIUM CHLORIDE 10 ML/HR: 450 INJECTION, SOLUTION INTRAVENOUS at 00:36

## 2017-01-21 RX ADMIN — OXYCODONE HYDROCHLORIDE AND ACETAMINOPHEN 2 TABLET: 5; 325 TABLET ORAL at 11:40

## 2017-01-21 RX ADMIN — VANCOMYCIN HYDROCHLORIDE 1250 MG: 10 INJECTION, POWDER, LYOPHILIZED, FOR SOLUTION INTRAVENOUS at 19:52

## 2017-01-21 RX ADMIN — Medication 10 ML: at 15:34

## 2017-01-21 RX ADMIN — DIPHENHYDRAMINE HYDROCHLORIDE 25 MG: 25 CAPSULE ORAL at 19:53

## 2017-01-21 RX ADMIN — HEPARIN SODIUM AND DEXTROSE 20 UNITS/KG/HR: 10000; 5 INJECTION INTRAVENOUS at 16:55

## 2017-01-21 RX ADMIN — POTASSIUM CHLORIDE 20 MEQ: 400 INJECTION, SOLUTION INTRAVENOUS at 23:03

## 2017-01-21 RX ADMIN — INSULIN LISPRO 2 UNITS: 100 INJECTION, SOLUTION INTRAVENOUS; SUBCUTANEOUS at 13:12

## 2017-01-21 RX ADMIN — POTASSIUM CHLORIDE 20 MEQ: 400 INJECTION, SOLUTION INTRAVENOUS at 09:02

## 2017-01-21 RX ADMIN — HEPARIN SODIUM 4000 UNITS: 5000 INJECTION, SOLUTION INTRAVENOUS; SUBCUTANEOUS at 05:46

## 2017-01-21 RX ADMIN — SODIUM CHLORIDE 1 MG/HR: 900 INJECTION, SOLUTION INTRAVENOUS at 20:13

## 2017-01-21 RX ADMIN — HEPARIN SODIUM 4000 UNITS: 5000 INJECTION, SOLUTION INTRAVENOUS; SUBCUTANEOUS at 13:38

## 2017-01-21 RX ADMIN — POTASSIUM CHLORIDE 20 MEQ: 400 INJECTION, SOLUTION INTRAVENOUS at 07:04

## 2017-01-21 RX ADMIN — MILRINONE LACTATE 0.38 MCG/KG/MIN: 200 INJECTION, SOLUTION INTRAVENOUS at 19:50

## 2017-01-21 RX ADMIN — Medication 10 ML: at 21:48

## 2017-01-21 RX ADMIN — Medication 2 MG: at 17:56

## 2017-01-21 RX ADMIN — MAGNESIUM SULFATE HEPTAHYDRATE 1 G: 1 INJECTION, SOLUTION INTRAVENOUS at 00:38

## 2017-01-21 RX ADMIN — DIPHENHYDRAMINE HYDROCHLORIDE 25 MG: 25 CAPSULE ORAL at 00:34

## 2017-01-21 RX ADMIN — INSULIN LISPRO 2 UNITS: 100 INJECTION, SOLUTION INTRAVENOUS; SUBCUTANEOUS at 22:52

## 2017-01-21 RX ADMIN — MAGNESIUM SULFATE HEPTAHYDRATE 1 G: 1 INJECTION, SOLUTION INTRAVENOUS at 21:48

## 2017-01-21 RX ADMIN — OXYCODONE HYDROCHLORIDE AND ACETAMINOPHEN 2 TABLET: 5; 325 TABLET ORAL at 08:08

## 2017-01-21 RX ADMIN — MILRINONE LACTATE 0.38 MCG/KG/MIN: 200 INJECTION, SOLUTION INTRAVENOUS at 08:09

## 2017-01-21 RX ADMIN — LEVOFLOXACIN 750 MG: 5 INJECTION, SOLUTION INTRAVENOUS at 15:32

## 2017-01-21 RX ADMIN — Medication 400 MG: at 09:19

## 2017-01-21 RX ADMIN — POTASSIUM CHLORIDE 20 MEQ: 400 INJECTION, SOLUTION INTRAVENOUS at 05:54

## 2017-01-21 RX ADMIN — MAGNESIUM SULFATE HEPTAHYDRATE 1 G: 1 INJECTION, SOLUTION INTRAVENOUS at 13:34

## 2017-01-21 NOTE — PROCEDURES
Arterial Line  Risks, benefits, alternatives explained and patient agrees to proceed. Collateral perfusion verified. Sterile prep with Chlorhexidine. 1 mL 1% Lidocaine placed at insertion site. The right radial artery cannulated x 1 attempt utilizing the Seldinger technique. Sterile throughout. Catheter secured. Sterile dressing placed. Derrell and flushed with ease. Patient without complaints.

## 2017-01-21 NOTE — PROGRESS NOTES
Bedside and Verbal shift change report given to [address] [facility] (oncoming nurse) by Loy Velasquez (offgoing nurse). Report included the following information SBAR, Kardex, MAR, Recent Results and Cardiac Rhythm NSR.

## 2017-01-21 NOTE — PROGRESS NOTES
Day #1 of Levofloxacin  Indication: CAP  Current regimen: 808 mg IV Q 24 hours  ID Following ?: YES  Frequency of BMP?: Daily        Recent Labs      17  0352 17  0450 17  2311   WBC 8.7 7.0 8.5   CREA 1.82* 2.12* 2.33*   BUN 45* 45* 40*   Est CrCl: 41.7 ml/min; UO: >1 ml/kg/hr  Temp (24hrs), Av.1 °F (36.7 °C), Min:97.6 °F (36.4 °C), Max:98.5 °F (36.9 °C)     Cultures:    Urine - NG - Final  No new Cx this admission        Plan: Change to 750 mg IV Q 48 hours for CrCl < 50 ml/min. Continue to monitor.

## 2017-01-21 NOTE — PROGRESS NOTES
Problem: Mobility Impaired (Adult and Pediatric)  Goal: *Acute Goals and Plan of Care (Insert Text)  Physical Therapy Goals  Initiated 1/21/2017  1. Patient will move from supine to sit and sit to supine in bed with modified independence within 7 day(s). 2. Patient will transfer from bed to chair and chair to bed with modified independence using the least restrictive device within 7 day(s). 3. Patient will perform sit to stand with modified independence within 7 day(s). 4. Patient will ambulate with modified independence for 200 feet with the least restrictive device within 7 day(s). 5. Patient will ascend/descend 4 stairs with 0 handrail(s) with modified independence within 7 day(s). PHYSICAL THERAPY EVALUATION  Patient: Azalia Michael (64 y.o. male)  Date: 1/21/2017  Primary Diagnosis: CAD  Systolic heart failure (HCC)        Precautions: None         ASSESSMENT :  Based on the objective data described below, the patient presents with LE swelling and pain causing moderate difficulty with overall ROM and strength. He reports independence with mobility until recent illness that has caused increased difficulty over the last 2 weeks from increasing LE swelling and pain at the onset of cellulitis. He is currently limited by a Thana Avers cath making gait training and activity beyond transfers difficult. Encouraged LE activity while sitting and he is completing mild UE exercises independently. He is currently undergoing workup to assess for cardiac surgery needs. PT will follow at a lower frequency at this time due to higher PLOF and medical limitations during his cardiac assessment. Patient will benefit from skilled intervention to address the above impairments.   Patients rehabilitation potential is considered to be Good  Factors which may influence rehabilitation potential include:   [ ]         None noted  [ ]         Mental ability/status  [X]         Medical condition  [ ]         Home/family situation and support systems  [ ]         Safety awareness  [ ]         Pain tolerance/management  [X]         Other: lines/leads       PLAN :  Recommendations and Planned Interventions:  [X]           Bed Mobility Training             [ ]    Neuromuscular Re-Education  [X]           Transfer Training                   [ ]    Orthotic/Prosthetic Training  [X]           Gait Training                         [ ]    Modalities  [X]           Therapeutic Exercises           [ ]    Edema Management/Control  [X]           Therapeutic Activities            [ ]    Patient and Family Training/Education  [ ]           Other (comment):     Frequency/Duration: Patient will be followed by physical therapy  3 times a week to address goals. Discharge Recommendations: To Be Determined  Further Equipment Recommendations for Discharge: None       SUBJECTIVE:   Patient stated My legs hurt when I first stand up because of the swelling.       OBJECTIVE DATA SUMMARY:   HISTORY:    Past Medical History   Diagnosis Date    Cardiomyopathy ([facility] [address] 1/20/2017       A. Echo (1/19/17):  EF 5-10% with severe GHK,. Mildly dil LA. Mild TR. PASP 46. No past surgical history on file.   Prior Level of Function/Home Situation: independent and active, lives with his wife  Personal factors and/or comorbidities impacting plan of care:      Home Situation  Home Environment: Private residence  # Steps to Enter: 4  Rails to Enter: No  One/Two Story Residence: One story  Living Alone: No  Support Systems: Spouse/Significant Other/Partner, Child(duane)  Patient Expects to be Discharged to[de-identified] Private residence  Current DME Used/Available at Home: Blood pressure cuff, Raised toilet seat, Walker, rolling, Cane, straight     EXAMINATION/PRESENTATION/DECISION MAKING:   Critical Behavior:  Neurologic State: Alert  Orientation Level: Oriented X4        Strength:    Strength: Generally decreased, functional                    Tone & Sensation: Sensation: Impaired (decreased light touch B feet, ? neuropathy)               Range Of Motion:  AROM: Generally decreased, functional (painful LEs d/t swelling, UE limited by Wing Eaton)                       Coordination:  Coordination: Generally decreased, functional     Functional Mobility:  Bed Mobility:              Transfers:  Sit to Stand: Contact guard assistance;Minimum assistance  Stand to Sit: Contact guard assistance                       Balance:   Sitting: Intact  Standing: With support; Impaired  Standing - Static: Fair  Standing - Dynamic : Fair  Ambulation/Gait Training:  Distance (ft): 3 Feet (ft)     Ambulation - Level of Assistance: Minimal assistance     Gait Description (WDL): Exceptions to WDL           Base of Support: Narrowed     Speed/Veronica: Slow                                Side stepping from chair to bed                      Physical Therapy Evaluation Charge Determination   History Examination Presentation Decision-Making   HIGH Complexity :3+ comorbidities / personal factors will impact the outcome/ POC  MEDIUM Complexity : 3 Standardized tests and measures addressing body structure, function, activity limitation and / or participation in recreation  MEDIUM Complexity : Evolving with changing characteristics  MEDIUM Complexity : FOTO score of 26-74      Based on the above components, the patient evaluation is determined to be of the following complexity level: MEDIUM     Pain:  Pain Scale 1: Numeric (0 - 10)  Pain Intensity 1: 6  Pain Location 1: Leg  Pain Orientation 1: Other (comment) (BLE)  Pain Description 1: Aching  Pain Intervention(s) 1: Medication (see MAR)  Activity Tolerance:   Vitals stable per monitor.  No signs of orthostatic hypotension     After treatment:   [X]         Patient left in no apparent distress sitting up in chair  [ ]         Patient left in no apparent distress in bed  [X]         Call bell left within reach  [X]         Nursing notified  [ ] Caregiver present  [ ]         Bed alarm activated      COMMUNICATION/EDUCATION:   The patients plan of care was discussed with: Registered Nurse.  [X]         Fall prevention education was provided and the patient/caregiver indicated understanding. [X]         Patient/family have participated as able in goal setting and plan of care. [ ]         Patient/family agree to work toward stated goals and plan of care. [ ]         Patient understands intent and goals of therapy, but is neutral about his/her participation. [ ]         Patient is unable to participate in goal setting and plan of care.      Thank you for this referral.  Roxana Kaur, PT, DPT   Time Calculation: 16 mins

## 2017-01-21 NOTE — PROGRESS NOTES
2200 bumex, heparin, and milrinone initiated post verification of line placement. Initial CI 1.0-1.3, sv02 41-46%.  Gordillo catheter placed post bladder scan 300mL - no void since prior to admission and unable to void, also for strict intake and output while on bumex gtt      0100 CI >2, sv02 >65%

## 2017-01-21 NOTE — PROGRESS NOTES
[facility]  Progress Note      Date: 2017     Admit Date: 2017       Subjective:   Feels ok today. More tachy than yesterday. On Milrinone and Bumex.        Objective:     Visit Vitals    /74    Pulse (!) 112    Temp 97.6 °F (36.4 °C)    Resp 20    Wt 178 lb 2.1 oz (80.8 kg)    SpO2 96%    BMI 25.93 kg/m2       Temp (24hrs), Av.2 °F (36.8 °C), Min:97.6 °F (36.4 °C), Max:98.5 °F (36.9 °C)    Hemodynamics:   CO: CO (l/min): 4 l/min   CI: CI (l/min/m2): 2.3 l/min/m2   CVP: CVP (mmHg): 4 mmHg (17)   SVR: SVR (dyne*sec)/cm5: 1596 (dyne*sec)/cm5 (17 1054)   PAP Systolic: PAP Systolic: 59 (92/42/24 3710)   PAP Diastolic: PAP Diastolic: 27 (23/98/60 2106)   PVR:     SV02: SVO2 (%): 60 % (17)   SCV02:         Oxygen Therapy:  Oxygen Therapy  O2 Sat (%): 96 % (17)  Pulse via Oximetry: 112 beats per minute (17)  O2 Device: Nasal cannula (17)  O2 Flow Rate (L/min): 3 l/min (17)      Admission Weight: Last Weight   Weight: 178 lb 2.1 oz (80.8 kg) Weight: 178 lb 2.1 oz (80.8 kg)           Intake / Output / Drain:  Last 24 hrs.:   Intake/Output Summary (Last 24 hours) at 17  Last data filed at 17   Gross per 24 hour   Intake           582.05 ml   Output             4377 ml   Net         -3794.95 ml         EXAM:  Neuro: A&O  Resp: CTA  CV: tachy  GI: +BS Soft NT/ND  : voiding  Ext: erythematous right leg      Recent Results (from the past 24 hour(s))   GLUCOSE, POC    Collection Time: 17 11:40 AM   Result Value Ref Range    Glucose (POC) 197 (H) 65 - 100 mg/dL    Performed by [address] [place] [address], POC    Collection Time: 17  6:30 PM   Result Value Ref Range    Glucose (POC) 229 (H) 65 - 100 mg/dL    Performed by Niranjan Logan    PTT    Collection Time: 17  8:05 PM   Result Value Ref Range    aPTT 20.2 (L) 22.1 - 32.5 sec    aPTT, therapeutic range 58.0 - 77.0 SECS   POTASSIUM    Collection Time: 01/20/17  8:05 PM   Result Value Ref Range    Potassium 4.1 3.5 - 5.1 mmol/L   MAGNESIUM    Collection Time: 01/20/17  8:05 PM   Result Value Ref Range    Magnesium 1.7 1.6 - 2.4 mg/dL   POC VENOUS BLOOD GAS    Collection Time: 01/20/17  8:14 PM   Result Value Ref Range    Device: NASAL CANNULA      Flow rate (POC) 3 L/M    pH, venous (POC) 7.382 7.32 - 7.42      pCO2, venous (POC) 40.6 (L) 41 - 51 MMHG    pO2, venous (POC) 24 (L) 25 - 40 mmHg    HCO3, venous (POC) 24.1 23.0 - 28.0 MMOL/L    sO2, venous (POC) 41 (L) 65 - 88 %    Base deficit, venous (POC) 1 mmol/L    Allens test (POC) N/A      Site SWAN TAVIA      Specimen type (POC) MIXED VENOUS     GLUCOSE, POC    Collection Time: 01/20/17 10:53 PM   Result Value Ref Range    Glucose (POC) 234 (H) 65 - 100 mg/dL    Performed by SINDY SLAUGHTER    URINALYSIS W/ REFLEX CULTURE    Collection Time: 01/20/17 11:08 PM   Result Value Ref Range    Color YELLOW/STRAW      Appearance CLOUDY (A) CLEAR      Specific gravity 1.030 1.003 - 1.030      pH (UA) 5.5 5.0 - 8.0      Protein 100 (A) NEG mg/dL    Glucose NEGATIVE  NEG mg/dL    Ketone NEGATIVE  NEG mg/dL    Blood MODERATE (A) NEG      Urobilinogen 1.0 0.2 - 1.0 EU/dL    Nitrites NEGATIVE  NEG      Leukocyte Esterase NEGATIVE  NEG      WBC 0-4 0 - 4 /hpf    RBC 5-10 0 - 5 /hpf    Epithelial cells MODERATE (A) FEW /lpf    Bacteria NEGATIVE  NEG /hpf    UA:UC IF INDICATED CULTURE NOT INDICATED BY UA RESULT CNI     BILIRUBIN, CONFIRM    Collection Time: 01/20/17 11:08 PM   Result Value Ref Range    Bilirubin UA, confirm POSITIVE (A) NEG     METABOLIC PANEL, COMPREHENSIVE    Collection Time: 01/21/17  3:52 AM   Result Value Ref Range    Sodium 130 (L) 136 - 145 mmol/L    Potassium 3.1 (L) 3.5 - 5.1 mmol/L    Chloride 91 (L) 97 - 108 mmol/L    CO2 26 21 - 32 mmol/L    Anion gap 13 5 - 15 mmol/L    Glucose 187 (H) 65 - 100 mg/dL    BUN 45 (H) 6 - 20 MG/DL    Creatinine 1.82 (H) 0.70 - 1.30 MG/DL    BUN/Creatinine ratio 25 (H) 12 - 20      GFR est AA 46 (L) >60 ml/min/1.73m2    GFR est non-AA 38 (L) >60 ml/min/1.73m2    Calcium 8.5 8.5 - 10.1 MG/DL    Bilirubin, total 0.9 0.2 - 1.0 MG/DL    ALT 9 (L) 12 - 78 U/L    AST 12 (L) 15 - 37 U/L    Alk.  phosphatase 91 45 - 117 U/L    Protein, total 6.5 6.4 - 8.2 g/dL    Albumin 2.7 (L) 3.5 - 5.0 g/dL    Globulin 3.8 2.0 - 4.0 g/dL    A-G Ratio 0.7 (L) 1.1 - 2.2     MAGNESIUM    Collection Time: 01/21/17  3:52 AM   Result Value Ref Range    Magnesium 2.3 1.6 - 2.4 mg/dL   CBC W/O DIFF    Collection Time: 01/21/17  3:52 AM   Result Value Ref Range    WBC 8.7 4.1 - 11.1 K/uL    RBC 5.14 4.10 - 5.70 M/uL    HGB 12.7 12.1 - 17.0 g/dL    HCT 38.6 36.6 - 50.3 %    MCV 75.1 (L) 80.0 - 99.0 FL    MCH 24.7 (L) 26.0 - 34.0 PG    MCHC 32.9 30.0 - 36.5 g/dL    RDW 15.6 (H) 11.5 - 14.5 %    PLATELET 077 878 - 488 K/uL   PTT    Collection Time: 01/21/17  3:52 AM   Result Value Ref Range    aPTT 31.8 22.1 - 32.5 sec    aPTT, therapeutic range     58.0 - 77.0 SECS   PRO-BNP    Collection Time: 01/21/17  3:52 AM   Result Value Ref Range    NT pro-BNP 7635 (H) 0 - 125 PG/ML   POC VENOUS BLOOD GAS    Collection Time: 01/21/17  4:22 AM   Result Value Ref Range    Device: NASAL CANNULA      Flow rate (POC) 3 L/M    pH, venous (POC) 7.395 7.32 - 7.42      pCO2, venous (POC) 38.8 (L) 41 - 51 MMHG    pO2, venous (POC) 30 25 - 40 mmHg    HCO3, venous (POC) 23.8 23.0 - 28.0 MMOL/L    sO2, venous (POC) 58 (L) 65 - 88 %    Base deficit, venous (POC) 1 mmol/L    Allens test (POC) N/A      Site SWAN TAVIA      Specimen type (POC) MIXED VENOUS     GLUCOSE, POC    Collection Time: 01/21/17  9:07 AM   Result Value Ref Range    Glucose (POC) 189 (H) 65 - 100 mg/dL    Performed by Sophy Catalan          Current Facility-Administered Medications:     potassium chloride 20 mEq in 50 ml IVPB, 20 mEq, IntraVENous, Q1H, Charan Cordova MD, Last Rate: 50 mL/hr at 01/21/17 0902, 20 mEq at 01/21/17 0902    sodium chloride (NS) flush 5-10 mL, 5-10 mL, InterCATHeter, Q8H, Patrick Cheney MD, 10 mL at 01/21/17 0557    sodium chloride (NS) flush 5-10 mL, 5-10 mL, InterCATHeter, PRN, Patrick Cheney MD    acetaminophen (TYLENOL) tablet 650 mg, 650 mg, Oral, Q6H PRN, Ciara Newby NP    aspirin delayed-release tablet 81 mg, 81 mg, Oral, DAILY, Ciara Newby NP, 81 mg at 01/21/17 0919    glucagon (GLUCAGEN) injection 1 mg, 1 mg, IntraMUSCular, PRN, Ciara Newby NP    glucose chewable tablet 16 g, 4 Tab, Oral, PRN, Ciara Newby NP    insulin glargine (LANTUS) injection 10 Units, 10 Units, SubCUTAneous, DAILY, Ciara Newby NP, 10 Units at 01/21/17 0918    insulin lispro (HUMALOG) injection, , SubCUTAneous, AC&HS, Ciara Newby NP, Stopped at 01/21/17 0730    magnesium oxide (MAG-OX) tablet 400 mg, 400 mg, Oral, DAILY, George Peacock NP, 400 mg at 01/21/17 0919    nicotine (NICODERM CQ) 21 mg/24 hr patch 1 Patch, 1 Patch, TransDERmal, Q24H, Ciara Newby NP, 1 Patch at 01/21/17 0811    traMADol (ULTRAM) tablet 50 mg, 50 mg, Oral, Q6H PRN, Ciara Newby NP    0.9% sodium chloride infusion, 9 mL/hr, IntraVENous, CONTINUOUS, Ciara Newby NP, Last Rate: 9 mL/hr at 01/20/17 2146, 9 mL/hr at 01/20/17 2146    0.45% sodium chloride infusion, 10 mL/hr, IntraVENous, CONTINUOUS, Ciara Newby, NP, Last Rate: 10 mL/hr at 01/21/17 0812, 10 mL/hr at 01/21/17 0349    sodium chloride (NS) flush 5-10 mL, 5-10 mL, IntraVENous, Q8H, George Peacock NP, 10 mL at 01/21/17 0557    sodium chloride (NS) flush 5-10 mL, 5-10 mL, IntraVENous, PRN, Ciara Newby NP    albuterol (PROVENTIL VENTOLIN) nebulizer solution 2.5 mg, 2.5 mg, Nebulization, Q4H PRN, Ciara Newby NP    heparin 25,000 units in D5W 250 ml infusion, 12-25 Units/kg/hr, IntraVENous, TITRATE, Ciara Newby NP, Last Rate: 13 mL/hr at 01/21/17 0812, 16 Units/kg/hr at 01/21/17 0812    morphine injection 2 mg, 2 mg, IntraVENous, Q4H PRN, Darrold Yara, NP, 2 mg at 01/20/17 1807    diphenhydrAMINE (BENADRYL) capsule 25 mg, 25 mg, Oral, QHS PRN, Darrold Yara, NP, 25 mg at 01/21/17 0034    oxyCODONE-acetaminophen (PERCOCET) 5-325 mg per tablet 2 Tab, 2 Tab, Oral, Q4H PRN, Darrold Yara, NP, 2 Tab at 01/21/17 0808    milrinone (PRIMACOR) 20 MG/100 ML D5W infusion, 0.375 mcg/kg/min, IntraVENous, CONTINUOUS, Iris Hidden Polliard, NP, Last Rate: 9.1 mL/hr at 01/21/17 0809, 0.375 mcg/kg/min at 01/21/17 0809    bumetanide (BUMEX) 12.5 mg in 0.9% sodium chloride 100 mL infusion, 1 mg/hr, IntraVENous, CONTINUOUS, Iris Hidden Polliard, NP, Last Rate: 8 mL/hr at 01/21/17 0805, 1 mg/hr at 01/21/17 0805    ELECTROLYTE REPLACEMENT PROTOCOL, 1 Each, Other, PRN, Darrold Yara, NP    vancomycin dosing per pharmacy, 1 Each, Other, Rx Dosing/Monitoring, Darrold Yara, NP       IMPRESSION:   1. Acute on chronic systolic heart failure (ICM) - Stage D, NYHA Class IV  2. Severe native coronary artery disease  3. Diabetes Mellitus  4. History of tobacco abuse  5. Cellulitis  6. CHACORTA on CKD3  7. Pulmonary HTN      PLAN:   1. IV milrinone 0.3 mcg/kg/min and IV bumex 1 mg/hour, Follow I/O, Hold ACE-I due to CHACORTA on CKD. Low dose BB   2. ASA, statin, low dose BB  3. Myocardial viability assessment once hemodynamically stable. 4. Lantus and sliding scale insulin, accuchecks, diabetic education  5. Schedule PFTs, carotid ultrasound, ABIs to further assess risk for surgical revascularization  6. IV vancomycin and consult infectious disease  7.  Smoking cessation counseling              Risk of morbidity and mortality - high  Medical decision making - high complexity        Signed By: Layne Lopez MD

## 2017-01-21 NOTE — PROGRESS NOTES
Problem: Falls - Risk of  Goal: *Absence of falls  Outcome: Progressing Towards Goal  Low fall risk, non skid socks on, fall risk band on, belongings and call bell within reach, nursing rounding every hour        Problem: Pressure Ulcer - Risk of  Goal: *Prevention of pressure ulcer  Outcome: Progressing Towards Goal  Turning q2h, monitoring glucose ACHS, nutrition status good, on TCS bed        Problem: Heart Failure: Day 1  Goal: Activity/Safety  Outcome: Progressing Towards Goal  OOB with assistance, NAVARRO noted  Goal: Consults, if ordered  Outcome: Progressing Towards Goal  Renal consulted, needs diabetes management for new diagnosis, seen by HF MDs  Goal: Diagnostic Test/Procedures  Outcome: Progressing Towards Goal  Urinalysis sent post mendoza insertion,   Labs drawn daily  PTT q6 hr per heparin protocol  Goal: Nutrition/Diet  Outcome: Progressing Towards Goal  Tolerating cardiac/diabetic diet  Goal: Medications  Outcome: Progressing Towards Goal  On bumex gtt  No ace d/t renal insufficiency  Replacing potassium per protocol  On milrinone  Percocet for pain  Heparin gtt for anticoagulation  Goal: Respiratory  Outcome: Progressing Towards Goal  POX >94% on 3L NC

## 2017-01-21 NOTE — PROGRESS NOTES
PA Catheter Procedure Note    Indication: CHF    Risks, benefits, and alternatives explained and patient agrees to proceed. Patient positioned in Trendelenburg position. 7-Step Sterility Protocol followed. (cap, mask, sterile gown, sterile gloves, large sterile sheet, hand hygiene, 2% chlorhexidine for cutaneous antisepsis)  Local with 5 mL 1% Lidocaine injected at insertion site. Right internal jugular cannulated x 1 attempt(s) utilizing sterile Seldinger technique. Venous cannulation confirmed with column drop test.    MAC inserted and secured. PA catheter introduced to the PA. Catheter secured & Biopatch applied. Sterile Tegaderm placed. CXR pending.

## 2017-01-21 NOTE — PROGRESS NOTES
Day #2 of Vancomycin  Indication:  SSTI  Current regimen:  Maintenance dose to be determined  ID Following ?: YES  Concomitant nephrotoxic drugs (requires more frequent monitoring):  Loop diuretics  Frequency of BMP?: Daily  Recent Labs      17   0352  17   0450  17   2311   WBC  8.7  7.0  8.5   CREA  1.82*  2.12*  2.33*   BUN  45*  45*  40*   Est CrCl: 41.7 ml/min; UO: >1 ml/kg/hr  Temp (24hrs), Av.1 °F (36.7 °C), Min:97.6 °F (36.4 °C), Max:98.5 °F (36.9 °C)    Cultures:    Urine - NG - Final  No new Cx this admission      Goal trough = 10 - 15 mcg/mL    Recent trough history (date/time/level/dose/action taken):  None thus far    Plan: Initiate a Maintenance dose of 1250 mg Q 24hrs

## 2017-01-21 NOTE — PROGRESS NOTES
Spoke with the patient and the patient's wife at length. Mr Luke Fallon feels much better this afternoon. Still has this dull ache in his lower extremities, right worse than left. His breathing is much less labored overall. Denies any chest pain. The plan is to get his heart function better with IV milrinone and bumex; repeat his ECHO on Monday and if his EF is better, get a cardiac MRI to check for viability. Informed the patient and his wife that he would be at extremely high risk for any invasive procedure given his extremely poor EF (likely a 20-25% risk of mortality). They want to proceed. Should we decide to proceed with CABG, he will need either an Impella or IABP pre-op. Will need to take vein from the left side as the right side would have a very hard time healing. Mild TR and no MR on ECHO       Cardiac cath shows left main and 3 vessel disease     I/O's - (-) 900 cc since this am    Neuro - A and O x 3; moves all extremities, no deficits    Cardiac - acute on chronic systolic heart failure, NYHA class IV, stage D (EF 5-10%); pulmonary HTN; on milrinone 0.375, Bumex gtt @ 1; BP 90/50's, HR 90's, PA's 50/20's, CVP 10-15; serial troponins were negative on 1/19/2017; BNP 1200; occasional PVCs; pressure a little low, will start vasopressin if it drops any further; heparin for tight left main disease     Pulmonary - left lower lobe infiltrate (pneumonia) and left sided effusion on chest CT; will place him on levofloxacin in addition to Vancomycin; will need formal PFTs before considering any surgery.     Heme - Hct 39, platelets 005, PTT 33; on heparin     ID - WBCs 9; lower extremity cellulitis / pneumonia - on levofloxacin and Vancomycin    Renal - acute renal injury (GFR 38, stage III acute kidney disease); creatinine down to 1.82 (2.33)    M/S - cellulitis - very erythematous right lower extremity     Psych - overall in good spirits     GI - LFTs normal; albumin 2.7    Critical care time - 85 minutes ([facility] [address], [id number])

## 2017-01-22 ENCOUNTER — APPOINTMENT (OUTPATIENT)
Dept: ULTRASOUND IMAGING | Age: 67
DRG: 215 | End: 2017-01-22
Attending: INTERNAL MEDICINE
Payer: SELF-PAY

## 2017-01-22 ENCOUNTER — APPOINTMENT (OUTPATIENT)
Dept: GENERAL RADIOLOGY | Age: 67
DRG: 215 | End: 2017-01-22
Attending: NURSE PRACTITIONER
Payer: SELF-PAY

## 2017-01-22 LAB
ALBUMIN SERPL BCP-MCNC: 2.7 G/DL (ref 3.5–5)
ALBUMIN/GLOB SERPL: 0.8 {RATIO} (ref 1.1–2.2)
ALP SERPL-CCNC: 83 U/L (ref 45–117)
ALT SERPL-CCNC: 10 U/L (ref 12–78)
ANION GAP BLD CALC-SCNC: 9 MMOL/L (ref 5–15)
APTT PPP: 54.3 SEC (ref 22.1–32.5)
APTT PPP: 57.6 SEC (ref 22.1–32.5)
APTT PPP: 64.1 SEC (ref 22.1–32.5)
ARTERIAL PATENCY WRIST A: ABNORMAL
AST SERPL W P-5'-P-CCNC: 10 U/L (ref 15–37)
BASE EXCESS BLDV CALC-SCNC: 8 MMOL/L
BDY SITE: ABNORMAL
BILIRUB SERPL-MCNC: 1 MG/DL (ref 0.2–1)
BNP SERPL-MCNC: 6090 PG/ML (ref 0–125)
BUN SERPL-MCNC: 35 MG/DL (ref 6–20)
BUN/CREAT SERPL: 23 (ref 12–20)
CALCIUM SERPL-MCNC: 8.2 MG/DL (ref 8.5–10.1)
CHLORIDE SERPL-SCNC: 86 MMOL/L (ref 97–108)
CO2 SERPL-SCNC: 32 MMOL/L (ref 21–32)
CORTIS AM PEAK SERPL-MCNC: 29.3 UG/DL (ref 4.3–22.4)
CREAT SERPL-MCNC: 1.52 MG/DL (ref 0.7–1.3)
CREAT UR-MCNC: 42.3 MG/DL
ERYTHROCYTE [DISTWIDTH] IN BLOOD BY AUTOMATED COUNT: 15.5 % (ref 11.5–14.5)
GAS FLOW.O2 O2 DELIVERY SYS: ABNORMAL L/MIN
GAS FLOW.O2 SETTING OXYMISER: 3 L/M
GLOBULIN SER CALC-MCNC: 3.4 G/DL (ref 2–4)
GLUCOSE BLD STRIP.AUTO-MCNC: 234 MG/DL (ref 65–100)
GLUCOSE BLD STRIP.AUTO-MCNC: 253 MG/DL (ref 65–100)
GLUCOSE BLD STRIP.AUTO-MCNC: 298 MG/DL (ref 65–100)
GLUCOSE BLD STRIP.AUTO-MCNC: 303 MG/DL (ref 65–100)
GLUCOSE SERPL-MCNC: 248 MG/DL (ref 65–100)
HCO3 BLDV-SCNC: 31.5 MMOL/L (ref 23–28)
HCT VFR BLD AUTO: 36.2 % (ref 36.6–50.3)
HGB BLD-MCNC: 11.9 G/DL (ref 12.1–17)
MAGNESIUM SERPL-MCNC: 1.8 MG/DL (ref 1.6–2.4)
MAGNESIUM SERPL-MCNC: 1.8 MG/DL (ref 1.6–2.4)
MAGNESIUM SERPL-MCNC: 2 MG/DL (ref 1.6–2.4)
MCH RBC QN AUTO: 24.5 PG (ref 26–34)
MCHC RBC AUTO-ENTMCNC: 32.9 G/DL (ref 30–36.5)
MCV RBC AUTO: 74.5 FL (ref 80–99)
OSMOLALITY UR: 322 MOSM/KG H2O
PCO2 BLDV: 43.2 MMHG (ref 41–51)
PH BLDV: 7.47 [PH] (ref 7.32–7.42)
PLATELET # BLD AUTO: 205 K/UL (ref 150–400)
PO2 BLDV: 29 MMHG (ref 25–40)
POTASSIUM SERPL-SCNC: 3.7 MMOL/L (ref 3.5–5.1)
POTASSIUM SERPL-SCNC: 3.7 MMOL/L (ref 3.5–5.1)
POTASSIUM SERPL-SCNC: 4.3 MMOL/L (ref 3.5–5.1)
POTASSIUM UR-SCNC: 44 MMOL/L
PROT SERPL-MCNC: 6.1 G/DL (ref 6.4–8.2)
RBC # BLD AUTO: 4.86 M/UL (ref 4.1–5.7)
SAO2 % BLDV: 59 % (ref 65–88)
SERVICE CMNT-IMP: ABNORMAL
SODIUM SERPL-SCNC: 127 MMOL/L (ref 136–145)
SODIUM UR-SCNC: 44 MMOL/L
SPECIMEN TYPE: ABNORMAL
THERAPEUTIC RANGE,PTTT: ABNORMAL SECS (ref 58–77)
TSH SERPL DL<=0.05 MIU/L-ACNC: 2.04 UIU/ML (ref 0.36–3.74)
WBC # BLD AUTO: 8.5 K/UL (ref 4.1–11.1)

## 2017-01-22 PROCEDURE — 82570 ASSAY OF URINE CREATININE: CPT | Performed by: INTERNAL MEDICINE

## 2017-01-22 PROCEDURE — 74011250636 HC RX REV CODE- 250/636: Performed by: THORACIC SURGERY (CARDIOTHORACIC VASCULAR SURGERY)

## 2017-01-22 PROCEDURE — 77010033678 HC OXYGEN DAILY

## 2017-01-22 PROCEDURE — 36415 COLL VENOUS BLD VENIPUNCTURE: CPT | Performed by: NURSE PRACTITIONER

## 2017-01-22 PROCEDURE — 85730 THROMBOPLASTIN TIME PARTIAL: CPT | Performed by: NURSE PRACTITIONER

## 2017-01-22 PROCEDURE — 84133 ASSAY OF URINE POTASSIUM: CPT | Performed by: INTERNAL MEDICINE

## 2017-01-22 PROCEDURE — 74011000258 HC RX REV CODE- 258: Performed by: THORACIC SURGERY (CARDIOTHORACIC VASCULAR SURGERY)

## 2017-01-22 PROCEDURE — 65610000003 HC RM ICU SURGICAL

## 2017-01-22 PROCEDURE — 76705 ECHO EXAM OF ABDOMEN: CPT

## 2017-01-22 PROCEDURE — 84443 ASSAY THYROID STIM HORMONE: CPT | Performed by: INTERNAL MEDICINE

## 2017-01-22 PROCEDURE — 82803 BLOOD GASES ANY COMBINATION: CPT

## 2017-01-22 PROCEDURE — 83735 ASSAY OF MAGNESIUM: CPT | Performed by: NURSE PRACTITIONER

## 2017-01-22 PROCEDURE — 83880 ASSAY OF NATRIURETIC PEPTIDE: CPT | Performed by: NURSE PRACTITIONER

## 2017-01-22 PROCEDURE — 84300 ASSAY OF URINE SODIUM: CPT | Performed by: INTERNAL MEDICINE

## 2017-01-22 PROCEDURE — 85730 THROMBOPLASTIN TIME PARTIAL: CPT | Performed by: THORACIC SURGERY (CARDIOTHORACIC VASCULAR SURGERY)

## 2017-01-22 PROCEDURE — 74011250637 HC RX REV CODE- 250/637: Performed by: NURSE PRACTITIONER

## 2017-01-22 PROCEDURE — 82962 GLUCOSE BLOOD TEST: CPT

## 2017-01-22 PROCEDURE — 74011250636 HC RX REV CODE- 250/636: Performed by: NURSE PRACTITIONER

## 2017-01-22 PROCEDURE — 74011636637 HC RX REV CODE- 636/637: Performed by: NURSE PRACTITIONER

## 2017-01-22 PROCEDURE — 84132 ASSAY OF SERUM POTASSIUM: CPT | Performed by: NURSE PRACTITIONER

## 2017-01-22 PROCEDURE — 85027 COMPLETE CBC AUTOMATED: CPT | Performed by: NURSE PRACTITIONER

## 2017-01-22 PROCEDURE — 83935 ASSAY OF URINE OSMOLALITY: CPT | Performed by: INTERNAL MEDICINE

## 2017-01-22 PROCEDURE — 80053 COMPREHEN METABOLIC PANEL: CPT | Performed by: NURSE PRACTITIONER

## 2017-01-22 PROCEDURE — 71010 XR CHEST PORT: CPT

## 2017-01-22 PROCEDURE — 74011000258 HC RX REV CODE- 258: Performed by: NURSE PRACTITIONER

## 2017-01-22 PROCEDURE — 77030011256 HC DRSG MEPILEX <16IN NO BORD MOLN -A

## 2017-01-22 PROCEDURE — 82533 TOTAL CORTISOL: CPT | Performed by: INTERNAL MEDICINE

## 2017-01-22 RX ORDER — MAGNESIUM SULFATE 1 G/100ML
1 INJECTION INTRAVENOUS ONCE
Status: COMPLETED | OUTPATIENT
Start: 2017-01-22 | End: 2017-01-22

## 2017-01-22 RX ORDER — SODIUM CHLORIDE 9 MG/ML
10 INJECTION, SOLUTION INTRAVENOUS CONTINUOUS
Status: DISCONTINUED | OUTPATIENT
Start: 2017-01-22 | End: 2017-02-15

## 2017-01-22 RX ORDER — POTASSIUM CHLORIDE 29.8 MG/ML
20 INJECTION INTRAVENOUS
Status: COMPLETED | OUTPATIENT
Start: 2017-01-22 | End: 2017-01-22

## 2017-01-22 RX ORDER — VANCOMYCIN HYDROCHLORIDE
1250
Status: DISCONTINUED | OUTPATIENT
Start: 2017-01-22 | End: 2017-01-23

## 2017-01-22 RX ADMIN — Medication 10 ML: at 14:38

## 2017-01-22 RX ADMIN — Medication 2 MG: at 01:57

## 2017-01-22 RX ADMIN — MAGNESIUM SULFATE HEPTAHYDRATE 1 G: 1 INJECTION, SOLUTION INTRAVENOUS at 19:26

## 2017-01-22 RX ADMIN — VASOPRESSIN 0.01 UNITS/MIN: 20 INJECTION INTRAVENOUS at 06:07

## 2017-01-22 RX ADMIN — MILRINONE LACTATE 0.38 MCG/KG/MIN: 200 INJECTION, SOLUTION INTRAVENOUS at 18:46

## 2017-01-22 RX ADMIN — INSULIN LISPRO 1 UNITS: 100 INJECTION, SOLUTION INTRAVENOUS; SUBCUTANEOUS at 22:58

## 2017-01-22 RX ADMIN — INSULIN LISPRO 3 UNITS: 100 INJECTION, SOLUTION INTRAVENOUS; SUBCUTANEOUS at 08:42

## 2017-01-22 RX ADMIN — OXYCODONE HYDROCHLORIDE AND ACETAMINOPHEN 2 TABLET: 5; 325 TABLET ORAL at 02:28

## 2017-01-22 RX ADMIN — Medication 400 MG: at 08:42

## 2017-01-22 RX ADMIN — MAGNESIUM SULFATE HEPTAHYDRATE 1 G: 1 INJECTION, SOLUTION INTRAVENOUS at 06:09

## 2017-01-22 RX ADMIN — POTASSIUM CHLORIDE 20 MEQ: 400 INJECTION, SOLUTION INTRAVENOUS at 20:55

## 2017-01-22 RX ADMIN — VANCOMYCIN HYDROCHLORIDE 1250 MG: 10 INJECTION, POWDER, LYOPHILIZED, FOR SOLUTION INTRAVENOUS at 14:38

## 2017-01-22 RX ADMIN — Medication 10 ML: at 07:37

## 2017-01-22 RX ADMIN — POTASSIUM CHLORIDE 20 MEQ: 400 INJECTION, SOLUTION INTRAVENOUS at 22:38

## 2017-01-22 RX ADMIN — OXYCODONE HYDROCHLORIDE AND ACETAMINOPHEN 2 TABLET: 5; 325 TABLET ORAL at 22:38

## 2017-01-22 RX ADMIN — SODIUM CHLORIDE 10 ML/HR: 450 INJECTION, SOLUTION INTRAVENOUS at 06:09

## 2017-01-22 RX ADMIN — Medication 81 MG: at 08:42

## 2017-01-22 RX ADMIN — POTASSIUM CHLORIDE 20 MEQ: 400 INJECTION, SOLUTION INTRAVENOUS at 08:43

## 2017-01-22 RX ADMIN — Medication 10 ML: at 21:08

## 2017-01-22 RX ADMIN — MILRINONE LACTATE 0.38 MCG/KG/MIN: 200 INJECTION, SOLUTION INTRAVENOUS at 07:41

## 2017-01-22 RX ADMIN — Medication 2 MG: at 16:28

## 2017-01-22 RX ADMIN — INSULIN GLARGINE 10 UNITS: 100 INJECTION, SOLUTION SUBCUTANEOUS at 08:59

## 2017-01-22 RX ADMIN — POTASSIUM CHLORIDE 20 MEQ: 400 INJECTION, SOLUTION INTRAVENOUS at 00:14

## 2017-01-22 RX ADMIN — INSULIN LISPRO 4 UNITS: 100 INJECTION, SOLUTION INTRAVENOUS; SUBCUTANEOUS at 11:28

## 2017-01-22 RX ADMIN — OXYCODONE HYDROCHLORIDE AND ACETAMINOPHEN 2 TABLET: 5; 325 TABLET ORAL at 14:35

## 2017-01-22 RX ADMIN — OXYCODONE HYDROCHLORIDE AND ACETAMINOPHEN 2 TABLET: 5; 325 TABLET ORAL at 09:07

## 2017-01-22 RX ADMIN — OXYCODONE HYDROCHLORIDE AND ACETAMINOPHEN 2 TABLET: 5; 325 TABLET ORAL at 18:27

## 2017-01-22 RX ADMIN — SODIUM CHLORIDE 9 ML/HR: 900 INJECTION, SOLUTION INTRAVENOUS at 06:09

## 2017-01-22 RX ADMIN — DIPHENHYDRAMINE HYDROCHLORIDE 25 MG: 25 CAPSULE ORAL at 20:49

## 2017-01-22 RX ADMIN — HEPARIN SODIUM AND DEXTROSE 23 UNITS/KG/HR: 10000; 5 INJECTION INTRAVENOUS at 07:42

## 2017-01-22 RX ADMIN — HEPARIN SODIUM AND DEXTROSE 24 UNITS/KG/HR: 10000; 5 INJECTION INTRAVENOUS at 20:54

## 2017-01-22 RX ADMIN — Medication 2 MG: at 07:40

## 2017-01-22 RX ADMIN — POTASSIUM CHLORIDE 20 MEQ: 400 INJECTION, SOLUTION INTRAVENOUS at 07:35

## 2017-01-22 RX ADMIN — INSULIN LISPRO 3 UNITS: 100 INJECTION, SOLUTION INTRAVENOUS; SUBCUTANEOUS at 17:27

## 2017-01-22 RX ADMIN — SODIUM CHLORIDE 10 ML/HR: 900 INJECTION, SOLUTION INTRAVENOUS at 11:23

## 2017-01-22 NOTE — PROGRESS NOTES
2000 report received from [facility]; patient resting comfortably in bed at this time; gtts verified  0157 prn morphine given for 10/10 BLE pain  0228 pain not resolved, still 8/10; prn percocet given   0600 CI noted 1.6, SvO2 62; /60 (70); vaso weaned to 0.01 as SVR is high   0615 CI now 2; SVR 1282; ABP 100s/50s; will monitor     Bedside and Verbal shift change report given to [facility] by Claudy Fuchs RN. Report included the following information SBAR, Kardex, Intake/Output, MAR, Accordion, Recent Results and Cardiac Rhythm sinus rhythm to sinus tachycardia. Call bell and personal items within patient's reach.         Problem: Falls - Risk of  Goal: *Absence of falls  Outcome: Progressing Towards Goal  Bed in low and locked position; call bell and personal belongings within reach, side rails up x3, nonskid footwear, patient A/Ox4 and appropriately using call bell  Goal: *Knowledge of fall prevention  Outcome: Progressing Towards Goal  Patient educated on use of call bell and instructed to call for assistance; patient verbalized understanding of instructions and demonstrated appropriate use    Problem: Pressure Ulcer - Risk of  Goal: *Prevention of pressure ulcer  Outcome: Progressing Towards Goal  Pressure ulcer risk assessment tool in use; Patient turned q2 and as needed, pillows and pressure reducing mattress in use; blood glucose monitored AC/HS; wound and skin care performed as ordered        Problem: Heart Failure: Day 2  Goal: *Oxygen saturation within defined limits  Outcome: Progressing Towards Goal  SpO2 >94% on 3L NC  Goal: *Hemodynamically stable  Outcome: Progressing Towards Goal  Patient hemodynamically stable though requiring milrinone, vasopressin and a bumex gtt for constant diuresis  Goal: *Optimal pain control at patients stated goal  Outcome: Progressing Towards Goal  PRN pain medications provided as needed; patient reports constant pain to BLE

## 2017-01-22 NOTE — CONSULTS
[facility]   [address] [address], [facility] [address], [place]  Phone: [phone]   [phone]       Nephrology Progress Note  Yimi Pierre.     1950     [id number]  Date of Admission : 1/20/2017 01/22/17    CC: Follow up for CKD/ARF      Assessment and Plan   CHACORTA on CKD :  · CHACORTA 2/2 Cardiorenal syndrome   · Baseline degree of CKD - unknown, but likely has Diabetic Nephropathy given his Proteinuria and untreated Diabetes   · Cr trending down with Inotropes and Diuresis ---> good sign ! · Would continue diuresis at reduced rate     Hyponatremia :  · Suspect has baseline Chronic Hyponatremia from Chronic Alcoholism   · On presentation, he was in florid CHF and we cant differentiate the picture  · He has been well diuresed and Na is worse and I donot suspect its due to diuresis   · He has VENANCIO -PNA and can cause SIADH  · Check Urine chemistries, TSH, Cortisol, RUQ US for fatty liver/ cirrhosis   · Hyponatremia pre op will increase of complications for  Anesthesia and surgery. · Would like to see Na > 130 prior to CABG   · If all w/u negative, will give him  One dose of Tolvaptan     Acute on Chronic Systolic CHF w/ severe CMP  - echo with EF 5-10%, severely dilated LV  - Multivessel CAD : being evaluated for CABG   - On Milrinone     Cellulitis RLE w/ Pustular lesions     Pulm HTN     Chronic smoker w/VENANCIO -PNA     Interval History:  Seen and examined   Followed by my partners at [facility] prior to transfer   His Cr trended down , but Na also trended down   10L UOP in last 2 days since arrival on Bumex gtt   I was asked to follow up in his renal issues     Review of Systems: Pertinent items are noted in HPI.     Current Medications:   Current Facility-Administered Medications   Medication Dose Route Frequency    vancomycin (VANCOCIN) 1250 mg in  ml infusion  1,250 mg IntraVENous Q24H    vasopressin (VASOSTRICT) 20 Units in 0.9% sodium chloride 100 mL infusion  0.01-0.04 Units/min IntraVENous TITRATE    levoFLOXacin (LEVAQUIN) 750 mg in D5W IVPB  750 mg IntraVENous Q48H    sodium chloride (NS) flush 5-10 mL  5-10 mL InterCATHeter Q8H    sodium chloride (NS) flush 5-10 mL  5-10 mL InterCATHeter PRN    acetaminophen (TYLENOL) tablet 650 mg  650 mg Oral Q6H PRN    aspirin delayed-release tablet 81 mg  81 mg Oral DAILY    glucagon (GLUCAGEN) injection 1 mg  1 mg IntraMUSCular PRN    glucose chewable tablet 16 g  4 Tab Oral PRN    insulin glargine (LANTUS) injection 10 Units  10 Units SubCUTAneous DAILY    insulin lispro (HUMALOG) injection   SubCUTAneous AC&HS    magnesium oxide (MAG-OX) tablet 400 mg  400 mg Oral DAILY    nicotine (NICODERM CQ) 21 mg/24 hr patch 1 Patch  1 Patch TransDERmal Q24H    0.9% sodium chloride infusion  9 mL/hr IntraVENous CONTINUOUS    0.45% sodium chloride infusion  10 mL/hr IntraVENous CONTINUOUS    sodium chloride (NS) flush 5-10 mL  5-10 mL IntraVENous Q8H    sodium chloride (NS) flush 5-10 mL  5-10 mL IntraVENous PRN    albuterol (PROVENTIL VENTOLIN) nebulizer solution 2.5 mg  2.5 mg Nebulization Q4H PRN    heparin 25,000 units in D5W 250 ml infusion  12-25 Units/kg/hr IntraVENous TITRATE    morphine injection 2 mg  2 mg IntraVENous Q4H PRN    diphenhydrAMINE (BENADRYL) capsule 25 mg  25 mg Oral QHS PRN    oxyCODONE-acetaminophen (PERCOCET) 5-325 mg per tablet 2 Tab  2 Tab Oral Q4H PRN    milrinone (PRIMACOR) 20 MG/100 ML D5W infusion  0.375 mcg/kg/min IntraVENous CONTINUOUS    bumetanide (BUMEX) 12.5 mg in 0.9% sodium chloride 100 mL infusion  1 mg/hr IntraVENous CONTINUOUS    ELECTROLYTE REPLACEMENT PROTOCOL  1 Each Other PRN    vancomycin dosing per pharmacy  1 Each Other Rx Dosing/Monitoring      No Known Allergies    Objective:  Vitals:    Vitals:    01/22/17 0600 01/22/17 0700 01/22/17 0800 01/22/17 0900   BP:       Pulse: (!) 104 98 (!) 108 (!) 109   Resp: 15 19 14 21   Temp:   97.6 °F (36.4 °C)    SpO2: 95% 97% 95% 98% Weight:         Intake and Output:  01/22 0701 - 01/22 1900  In: 215.4 [I.V.:215.4]  Out: 640 [Urine:950]  01/20 1901 - 01/22 0700  In: 3031.9 [P.O.:440; I.V.:2591.9]  Out: 56562 [Urine:01254]    Physical Examination:  General: Awake, alert  Neck:  JVD +  Resp:  diminished on L side   CV:  RRR,  no murmur or rub,+ LE edema  GI:  Soft, NT, + Bowel sounds, no hepatosplenomegaly  Neurologic:  Non focal  Skin:  RLE cellulitis w/ pustules   :  No mendoza    []    High complexity decision making was performed  []    Patient is at high-risk of decompensation with multiple organ involvement    Lab Data Personally Reviewed: I have reviewed all the pertinent labs, microbiology data and radiology studies during assessment.     Recent Labs      01/22/17 0401 01/21/17 1922 01/21/17   1225 01/21/17 0352 01/20/17 2005 01/20/17 0450   NA  127*   --    --   130*   --   128*   K  3.7  3.5  4.1  3.1*  4.1  2.9*   CL  86*   --    --   91*   --   91*   CO2  32   --    --   26   --   24   GLU  248*   --    --   187*   --   151*   BUN  35*   --    --   45*   --   45*   CREA  1.52*   --    --   1.82*   --   2.12*   CA  8.2*   --    --   8.5   --   8.7   MG  1.8  1.7  1.7  2.3  1.7  1.6   ALB  2.7*   --    --   2.7*   --    --    SGOT  10*   --    --   12*   --    --    ALT  10*   --    --   9*   --    --      Recent Labs      01/22/17 0401 01/21/17 0352 01/20/17 0450   WBC  8.5  8.7  7.0   HGB  11.9*  12.7  13.3   HCT  36.2*  38.6  39.5   PLT  205  226  233     No results found for: SDES  Lab Results   Component Value Date/Time    Culture result: NO SIGNIFICANT GROWTH 01/19/2017 01:35 AM     Recent Results (from the past 24 hour(s))   GLUCOSE, POC    Collection Time: 01/21/17 12:24 PM   Result Value Ref Range    Glucose (POC) 267 (H) 65 - 100 mg/dL    Performed by Jimbo Quiles      POTASSIUM    Collection Time: 01/21/17 12:25 PM   Result Value Ref Range    Potassium 4.1 3.5 - 5.1 mmol/L   MAGNESIUM    Collection Time: 01/21/17 12:25 PM   Result Value Ref Range    Magnesium 1.7 1.6 - 2.4 mg/dL   PTT    Collection Time: 01/21/17 12:25 PM   Result Value Ref Range    aPTT 33.8 (H) 22.1 - 32.5 sec    aPTT, therapeutic range     58.0 - 77.0 SECS   BNP    Collection Time: 01/21/17 12:25 PM   Result Value Ref Range    BNP 1209 (H) 0 - 100 pg/mL   GLUCOSE, POC    Collection Time: 01/21/17  4:29 PM   Result Value Ref Range    Glucose (POC) 311 (H) 65 - 100 mg/dL    Performed by Gigi De Leon      POTASSIUM    Collection Time: 01/21/17  7:22 PM   Result Value Ref Range    Potassium 3.5 3.5 - 5.1 mmol/L   MAGNESIUM    Collection Time: 01/21/17  7:22 PM   Result Value Ref Range    Magnesium 1.7 1.6 - 2.4 mg/dL   PTT    Collection Time: 01/21/17  7:26 PM   Result Value Ref Range    aPTT 43.1 (H) 22.1 - 32.5 sec    aPTT, therapeutic range     58.0 - 77.0 SECS   GLUCOSE, POC    Collection Time: 01/21/17 10:08 PM   Result Value Ref Range    Glucose (POC) 292 (H) 65 - 100 mg/dL    Performed by Anamaria BRYANT VENOUS BLOOD GAS    Collection Time: 01/22/17  2:35 AM   Result Value Ref Range    Device: NASAL CANNULA      Flow rate (POC) 3 L/M    pH, venous (POC) 7.471 (H) 7.32 - 7.42      pCO2, venous (POC) 43.2 41 - 51 MMHG    pO2, venous (POC) 29 25 - 40 mmHg    HCO3, venous (POC) 31.5 (H) 23.0 - 28.0 MMOL/L    sO2, venous (POC) 59 (L) 65 - 88 %    Base excess, venous (POC) 8 mmol/L    Allens test (POC) N/A      Site [facility]      Specimen type (POC) MIXED VENOUS     METABOLIC PANEL, COMPREHENSIVE    Collection Time: 01/22/17  4:01 AM   Result Value Ref Range    Sodium 127 (L) 136 - 145 mmol/L    Potassium 3.7 3.5 - 5.1 mmol/L    Chloride 86 (L) 97 - 108 mmol/L    CO2 32 21 - 32 mmol/L    Anion gap 9 5 - 15 mmol/L    Glucose 248 (H) 65 - 100 mg/dL    BUN 35 (H) 6 - 20 MG/DL    Creatinine 1.52 (H) 0.70 - 1.30 MG/DL    BUN/Creatinine ratio 23 (H) 12 - 20      GFR est AA 56 (L) >60 ml/min/1.73m2    GFR est non-AA 46 (L) >60 ml/min/1.73m2    Calcium 8.2 (L) 8.5 - 10.1 MG/DL    Bilirubin, total 1.0 0.2 - 1.0 MG/DL    ALT 10 (L) 12 - 78 U/L    AST 10 (L) 15 - 37 U/L    Alk. phosphatase 83 45 - 117 U/L    Protein, total 6.1 (L) 6.4 - 8.2 g/dL    Albumin 2.7 (L) 3.5 - 5.0 g/dL    Globulin 3.4 2.0 - 4.0 g/dL    A-G Ratio 0.8 (L) 1.1 - 2.2     MAGNESIUM    Collection Time: 01/22/17  4:01 AM   Result Value Ref Range    Magnesium 1.8 1.6 - 2.4 mg/dL   CBC W/O DIFF    Collection Time: 01/22/17  4:01 AM   Result Value Ref Range    WBC 8.5 4.1 - 11.1 K/uL    RBC 4.86 4.10 - 5.70 M/uL    HGB 11.9 (L) 12.1 - 17.0 g/dL    HCT 36.2 (L) 36.6 - 50.3 %    MCV 74.5 (L) 80.0 - 99.0 FL    MCH 24.5 (L) 26.0 - 34.0 PG    MCHC 32.9 30.0 - 36.5 g/dL    RDW 15.5 (H) 11.5 - 14.5 %    PLATELET 891 012 - 633 K/uL   PTT    Collection Time: 01/22/17  4:01 AM   Result Value Ref Range    aPTT 54.3 (H) 22.1 - 32.5 sec    aPTT, therapeutic range     58.0 - 77.0 SECS   PRO-BNP    Collection Time: 01/22/17  4:01 AM   Result Value Ref Range    NT pro-BNP 6090 (H) 0 - 125 PG/ML   GLUCOSE, POC    Collection Time: 01/22/17  7:39 AM   Result Value Ref Range    Glucose (POC) 253 (H) 65 - 100 mg/dL    Performed by Gilberto Agrawal            I have reviewed the flowsheets. Chart and Pertinent Notes have been reviewed. No change in PMH ,family and social history from Consult note.       Billie Lott MD

## 2017-01-22 NOTE — PROGRESS NOTES
[facility]  Progress Note      Date: 2017     Admit Date: 2017       Subjective:   Looks better this am.  Milrinone and Bumex. Na 127.        Objective:     Visit Vitals    /63 (BP 1 Location: Left arm, BP Patient Position: At rest)    Pulse (!) 108    Temp 97.6 °F (36.4 °C)    Resp 14    Wt 178 lb 2.1 oz (80.8 kg)    SpO2 95%    BMI 25.93 kg/m2         Temp (24hrs), Av.9 °F (36.6 °C), Min:97.6 °F (36.4 °C), Max:98.4 °F (36.9 °C)    Oxygen Therapy:  Oxygen Therapy  O2 Sat (%): 95 % (17)  Pulse via Oximetry: 108 beats per minute (17)  O2 Device: Nasal cannula (17)  O2 Flow Rate (L/min): 3 l/min (17)      Admission Weight: Last Weight   Weight: 178 lb 2.1 oz (80.8 kg) Weight: 178 lb 2.1 oz (80.8 kg)           Intake / Output / Drain:  Last 24 hrs.:   Intake/Output Summary (Last 24 hours) at 17 0853  Last data filed at 17 0735   Gross per 24 hour   Intake          2499.81 ml   Output             6080 ml   Net         -3580.19 ml     EXAM:  Neuro: A&O  Resp: CTA  CV: tachy  GI: +BS Soft NT/ND  : voiding  Ext: erythematous right leg    Recent Results (from the past 24 hour(s))   GLUCOSE, POC    Collection Time: 17  9:07 AM   Result Value Ref Range    Glucose (POC) 189 (H) 65 - 100 mg/dL    Performed by Juanetta Route      GLUCOSE, POC    Collection Time: 17 12:24 PM   Result Value Ref Range    Glucose (POC) 267 (H) 65 - 100 mg/dL    Performed by Juanetta Route      POTASSIUM    Collection Time: 17 12:25 PM   Result Value Ref Range    Potassium 4.1 3.5 - 5.1 mmol/L   MAGNESIUM    Collection Time: 17 12:25 PM   Result Value Ref Range    Magnesium 1.7 1.6 - 2.4 mg/dL   PTT    Collection Time: 17 12:25 PM   Result Value Ref Range    aPTT 33.8 (H) 22.1 - 32.5 sec    aPTT, therapeutic range     58.0 - 77.0 SECS   BNP    Collection Time: 17 12:25 PM   Result Value Ref Range BNP 1209 (H) 0 - 100 pg/mL   GLUCOSE, POC    Collection Time: 01/21/17  4:29 PM   Result Value Ref Range    Glucose (POC) 311 (H) 65 - 100 mg/dL    Performed by Penny Garcia      POTASSIUM    Collection Time: 01/21/17  7:22 PM   Result Value Ref Range    Potassium 3.5 3.5 - 5.1 mmol/L   MAGNESIUM    Collection Time: 01/21/17  7:22 PM   Result Value Ref Range    Magnesium 1.7 1.6 - 2.4 mg/dL   PTT    Collection Time: 01/21/17  7:26 PM   Result Value Ref Range    aPTT 43.1 (H) 22.1 - 32.5 sec    aPTT, therapeutic range     58.0 - 77.0 SECS   GLUCOSE, POC    Collection Time: 01/21/17 10:08 PM   Result Value Ref Range    Glucose (POC) 292 (H) 65 - 100 mg/dL    Performed by Araceli Mccain    POC VENOUS BLOOD GAS    Collection Time: 01/22/17  2:35 AM   Result Value Ref Range    Device: NASAL CANNULA      Flow rate (POC) 3 L/M    pH, venous (POC) 7.471 (H) 7.32 - 7.42      pCO2, venous (POC) 43.2 41 - 51 MMHG    pO2, venous (POC) 29 25 - 40 mmHg    HCO3, venous (POC) 31.5 (H) 23.0 - 28.0 MMOL/L    sO2, venous (POC) 59 (L) 65 - 88 %    Base excess, venous (POC) 8 mmol/L    Allens test (POC) N/A      Site [facility]      Specimen type (POC) MIXED VENOUS     METABOLIC PANEL, COMPREHENSIVE    Collection Time: 01/22/17  4:01 AM   Result Value Ref Range    Sodium 127 (L) 136 - 145 mmol/L    Potassium 3.7 3.5 - 5.1 mmol/L    Chloride 86 (L) 97 - 108 mmol/L    CO2 32 21 - 32 mmol/L    Anion gap 9 5 - 15 mmol/L    Glucose 248 (H) 65 - 100 mg/dL    BUN 35 (H) 6 - 20 MG/DL    Creatinine 1.52 (H) 0.70 - 1.30 MG/DL    BUN/Creatinine ratio 23 (H) 12 - 20      GFR est AA 56 (L) >60 ml/min/1.73m2    GFR est non-AA 46 (L) >60 ml/min/1.73m2    Calcium 8.2 (L) 8.5 - 10.1 MG/DL    Bilirubin, total 1.0 0.2 - 1.0 MG/DL    ALT 10 (L) 12 - 78 U/L    AST 10 (L) 15 - 37 U/L    Alk.  phosphatase 83 45 - 117 U/L    Protein, total 6.1 (L) 6.4 - 8.2 g/dL    Albumin 2.7 (L) 3.5 - 5.0 g/dL    Globulin 3.4 2.0 - 4.0 g/dL    A-G Ratio 0.8 (L) 1.1 - 2.2 MAGNESIUM    Collection Time: 01/22/17  4:01 AM   Result Value Ref Range    Magnesium 1.8 1.6 - 2.4 mg/dL   CBC W/O DIFF    Collection Time: 01/22/17  4:01 AM   Result Value Ref Range    WBC 8.5 4.1 - 11.1 K/uL    RBC 4.86 4.10 - 5.70 M/uL    HGB 11.9 (L) 12.1 - 17.0 g/dL    HCT 36.2 (L) 36.6 - 50.3 %    MCV 74.5 (L) 80.0 - 99.0 FL    MCH 24.5 (L) 26.0 - 34.0 PG    MCHC 32.9 30.0 - 36.5 g/dL    RDW 15.5 (H) 11.5 - 14.5 %    PLATELET 017 752 - 146 K/uL   PTT    Collection Time: 01/22/17  4:01 AM   Result Value Ref Range    aPTT 54.3 (H) 22.1 - 32.5 sec    aPTT, therapeutic range     58.0 - 77.0 SECS   GLUCOSE, POC    Collection Time: 01/22/17  7:39 AM   Result Value Ref Range    Glucose (POC) 253 (H) 65 - 100 mg/dL    Performed by Merna Og        Current Facility-Administered Medications:     potassium chloride 20 mEq in 50 ml IVPB, 20 mEq, IntraVENous, Q1H, Rocío Hernandez MD, Last Rate: 50 mL/hr at 01/22/17 0843, 20 mEq at 01/22/17 0843    vancomycin (VANCOCIN) 1250 mg in  ml infusion, 1,250 mg, IntraVENous, Q24H, Bibi Peacock NP, Last Rate: 125 mL/hr at 01/1950, 1,250 mg at 01/1950    vasopressin (VASOSTRICT) 20 Units in 0.9% sodium chloride 100 mL infusion, 0.01-0.04 Units/min, IntraVENous, TITRATE, Rocío Hernandez MD, Last Rate: 3 mL/hr at 01/22/17 0751, 0.01 Units/min at 01/22/17 0751    levoFLOXacin (LEVAQUIN) 750 mg in D5W IVPB, 750 mg, IntraVENous, Q48H, Rocío Hernandez MD, Last Rate: 100 mL/hr at 01/21/17 1532, 750 mg at 01/21/17 1532    sodium chloride (NS) flush 5-10 mL, 5-10 mL, InterCATHeter, Q8H, Rocío Hernandez MD, 10 mL at 01/22/17 0737    sodium chloride (NS) flush 5-10 mL, 5-10 mL, InterCATHeter, PRN, Rocío Hernandez MD    acetaminophen (TYLENOL) tablet 650 mg, 650 mg, Oral, Q6H PRN, Fco Isaacs NP    aspirin delayed-release tablet 81 mg, 81 mg, Oral, DAILY, Bibi Peacock NP, 81 mg at 01/22/17 0842    glucagon (GLUCAGEN) injection 1 mg, 1 mg, IntraMUSCular, PRN, Linus Neff, NP    glucose chewable tablet 16 g, 4 Tab, Oral, PRN, Linus Neff, NP    insulin glargine (LANTUS) injection 10 Units, 10 Units, SubCUTAneous, DAILY, Linus Neff, MONICA, 10 Units at 01/21/17 0918    insulin lispro (HUMALOG) injection, , SubCUTAneous, AC&HS, Linus Neff, NP, 3 Units at 01/22/17 0842    magnesium oxide (MAG-OX) tablet 400 mg, 400 mg, Oral, DAILY, Linus Neff, NP, 400 mg at 01/22/17 0842    nicotine (NICODERM CQ) 21 mg/24 hr patch 1 Patch, 1 Patch, TransDERmal, Q24H, Linus Neff NP, 1 Patch at 01/22/17 0735    0.9% sodium chloride infusion, 9 mL/hr, IntraVENous, CONTINUOUS, Linus Neff NP, Last Rate: 9 mL/hr at 01/22/17 0753, 9 mL/hr at 01/22/17 0753    0.45% sodium chloride infusion, 10 mL/hr, IntraVENous, CONTINUOUS, Linus Neff NP, Last Rate: 10 mL/hr at 01/22/17 0752, 10 mL/hr at 01/22/17 0752    sodium chloride (NS) flush 5-10 mL, 5-10 mL, IntraVENous, Q8H, Fatuma Peacock, NP, 10 mL at 01/22/17 0737    sodium chloride (NS) flush 5-10 mL, 5-10 mL, IntraVENous, PRN, Linus Neff NP    albuterol (PROVENTIL VENTOLIN) nebulizer solution 2.5 mg, 2.5 mg, Nebulization, Q4H PRN, Linus Neff NP    heparin 25,000 units in D5W 250 ml infusion, 12-25 Units/kg/hr, IntraVENous, TITRATE, Linus Neff NP, Last Rate: 18.6 mL/hr at 01/22/17 0742, 23 Units/kg/hr at 01/22/17 0742    morphine injection 2 mg, 2 mg, IntraVENous, Q4H PRN, Linus Neff, MONICA, 2 mg at 01/22/17 0740    diphenhydrAMINE (BENADRYL) capsule 25 mg, 25 mg, Oral, QHS PRN, Linus Neff NP, 25 mg at 01/21/17 1953    oxyCODONE-acetaminophen (PERCOCET) 5-325 mg per tablet 2 Tab, 2 Tab, Oral, Q4H PRN, Linus Neff NP, 2 Tab at 01/22/17 0228    milrinone (PRIMACOR) 20 MG/100 ML D5W infusion, 0.375 mcg/kg/min, IntraVENous, CONTINUOUS, Fatuma Peacock NP, Last Rate: 9.1 mL/hr at 01/22/17 0751, 0.375 mcg/kg/min at 01/22/17 0751   bumetanide (BUMEX) 12.5 mg in 0.9% sodium chloride 100 mL infusion, 1 mg/hr, IntraVENous, CONTINUOUS, Ama Peacock NP, Last Rate: 8 mL/hr at 01/22/17 0752, 1 mg/hr at 01/22/17 0752    ELECTROLYTE REPLACEMENT PROTOCOL, 1 Each, Other, PRN, Ester Kee NP    vancomycin dosing per pharmacy, 1 Each, Other, Rx Dosing/Monitoring, Ester Kee NP       IMPRESSION:   1. Acute on chronic systolic heart failure (ICM) - Stage D, NYHA Class IV  2. Severe native coronary artery disease  3. Diabetes Mellitus  4. History of tobacco abuse  5. Cellulitis  6. CHACORTA on CKD3  7. Pulmonary HTN       PLAN:   1. IV milrinone 0.3 mcg/kg/min and IV bumex 1 mg/hour, Follow I/O, Hold ACE-I due to CHACORTA on CKD. Low dose BB   2. ASA, statin, low dose BB  3. Myocardial viability assessment once hemodynamically stable. 4. Lantus and sliding scale insulin, accuchecks, diabetic education  5. Schedule PFTs, carotid ultrasound, ABIs to further assess risk for surgical revascularization  6. IV vancomycin and levofloxacin   7.  Smoking cessation counseling             Risk of morbidity and mortality - high  Medical decision making - high complexity     Signed By: Jamie Lara MD

## 2017-01-22 NOTE — PROGRESS NOTES
Day #3 of Vancomycin  Indication:  SSTI  Current regimen:  1250 mg Q 24hrs  ID Following ?: YES  Concomitant nephrotoxic drugs (requires more frequent monitoring): Loop diuretics and Vasopressors  Frequency of BMP?: Daily  Recent Labs      17   0401  17   0352  17   0450   WBC  8.5  8.7  7.0   CREA  1.52*  1.82*  2.12*   BUN  35*  45*  45*   Est CrCl: 49.9 ml/min; UO: >1 ml/kg/hr  Temp (24hrs), Av.1 °F (36.7 °C), Min:97.6 °F (36.4 °C), Max:98.5 °F (36.9 °C)    Cultures:    Urine - NG - Final  No new Cx this admission      Goal trough = 10 - 15 mcg/mL    Recent trough history (date/time/level/dose/action taken):  None thus far    Plan:  Renal function continues to improve.   Will change regimen to 1250 mg Q 18hrs

## 2017-01-22 NOTE — PROGRESS NOTES
Bedside and Verbal shift change report given to Jeni Perez RN (oncoming nurse) by Nova Scheuermann RN (offgoing nurse). Report included the following information SBAR, Kardex, ED Summary, Procedure Summary, Intake/Output, MAR, Accordion, Recent Results, Med Rec Status, Cardiac Rhythm Sinus Tach and Alarm Parameters .

## 2017-01-22 NOTE — PROGRESS NOTES
0800: care of patient taken over. Patient up to the chair. Dr. Yoo Reap rounding. 0900: Eating breakfast.   0940:  at bedside for Renal consult. 1500: Abd Ultrasound performed. 1900: first therapeutic PTT value reached no change to heparin gtt. Will re-check PTT in 6 hrs per protocol. 2000: Bedside and Verbal shift change report given to Mi Whitehead RN (oncoming nurse) by Trenton Ennis RN (offgoing nurse). Report included the following information SBAR, Kardex, ED Summary, OR Summary, Procedure Summary, Intake/Output, MAR, Accordion, Recent Results, Med Rec Status, Cardiac Rhythm Sinus Tach and Alarm Parameters .

## 2017-01-23 ENCOUNTER — APPOINTMENT (OUTPATIENT)
Dept: GENERAL RADIOLOGY | Age: 67
DRG: 215 | End: 2017-01-23
Attending: NURSE PRACTITIONER
Payer: SELF-PAY

## 2017-01-23 LAB
ALBUMIN SERPL BCP-MCNC: 2.6 G/DL (ref 3.5–5)
ALBUMIN SERPL BCP-MCNC: 2.8 G/DL (ref 3.5–5)
ALBUMIN/GLOB SERPL: 0.7 {RATIO} (ref 1.1–2.2)
ALP SERPL-CCNC: 87 U/L (ref 45–117)
ALT SERPL-CCNC: 9 U/L (ref 12–78)
ANION GAP BLD CALC-SCNC: 6 MMOL/L (ref 5–15)
ANION GAP BLD CALC-SCNC: 7 MMOL/L (ref 5–15)
APTT PPP: 60 SEC (ref 22.1–32.5)
APTT PPP: 75.8 SEC (ref 22.1–32.5)
ARTERIAL PATENCY WRIST A: ABNORMAL
ARTERIAL PATENCY WRIST A: ABNORMAL
AST SERPL W P-5'-P-CCNC: 14 U/L (ref 15–37)
ATRIAL RATE: 99 BPM
BASE EXCESS BLDV CALC-SCNC: 10 MMOL/L
BASE EXCESS BLDV CALC-SCNC: 6 MMOL/L
BDY SITE: ABNORMAL
BDY SITE: ABNORMAL
BILIRUB SERPL-MCNC: 0.8 MG/DL (ref 0.2–1)
BNP SERPL-MCNC: 7086 PG/ML (ref 0–125)
BUN SERPL-MCNC: 27 MG/DL (ref 6–20)
BUN SERPL-MCNC: 28 MG/DL (ref 6–20)
BUN/CREAT SERPL: 17 (ref 12–20)
BUN/CREAT SERPL: 18 (ref 12–20)
CALCIUM SERPL-MCNC: 8.4 MG/DL (ref 8.5–10.1)
CALCIUM SERPL-MCNC: 8.5 MG/DL (ref 8.5–10.1)
CALCULATED P AXIS, ECG09: 53 DEGREES
CALCULATED R AXIS, ECG10: 73 DEGREES
CALCULATED T AXIS, ECG11: -63 DEGREES
CHLORIDE SERPL-SCNC: 84 MMOL/L (ref 97–108)
CHLORIDE SERPL-SCNC: 84 MMOL/L (ref 97–108)
CO2 SERPL-SCNC: 33 MMOL/L (ref 21–32)
CO2 SERPL-SCNC: 35 MMOL/L (ref 21–32)
CREAT SERPL-MCNC: 1.6 MG/DL (ref 0.7–1.3)
CREAT SERPL-MCNC: 1.63 MG/DL (ref 0.7–1.3)
DIAGNOSIS, 93000: NORMAL
ERYTHROCYTE [DISTWIDTH] IN BLOOD BY AUTOMATED COUNT: 15.4 % (ref 11.5–14.5)
GAS FLOW.O2 O2 DELIVERY SYS: ABNORMAL L/MIN
GAS FLOW.O2 O2 DELIVERY SYS: ABNORMAL L/MIN
GAS FLOW.O2 SETTING OXYMISER: 2 L/M
GAS FLOW.O2 SETTING OXYMISER: 3 L/M
GLOBULIN SER CALC-MCNC: 3.5 G/DL (ref 2–4)
GLUCOSE BLD STRIP.AUTO-MCNC: 218 MG/DL (ref 65–100)
GLUCOSE BLD STRIP.AUTO-MCNC: 222 MG/DL (ref 65–100)
GLUCOSE BLD STRIP.AUTO-MCNC: 238 MG/DL (ref 65–100)
GLUCOSE BLD STRIP.AUTO-MCNC: 309 MG/DL (ref 65–100)
GLUCOSE SERPL-MCNC: 214 MG/DL (ref 65–100)
GLUCOSE SERPL-MCNC: 244 MG/DL (ref 65–100)
HCO3 BLDV-SCNC: 29.7 MMOL/L (ref 23–28)
HCO3 BLDV-SCNC: 33.8 MMOL/L (ref 23–28)
HCT VFR BLD AUTO: 35.3 % (ref 36.6–50.3)
HGB BLD-MCNC: 11.8 G/DL (ref 12.1–17)
INR PPP: 1.3 (ref 0.9–1.1)
MAGNESIUM SERPL-MCNC: 1.7 MG/DL (ref 1.6–2.4)
MAGNESIUM SERPL-MCNC: 1.8 MG/DL (ref 1.6–2.4)
MAGNESIUM SERPL-MCNC: 2.1 MG/DL (ref 1.6–2.4)
MAGNESIUM SERPL-MCNC: 2.1 MG/DL (ref 1.6–2.4)
MCH RBC QN AUTO: 25 PG (ref 26–34)
MCHC RBC AUTO-ENTMCNC: 33.4 G/DL (ref 30–36.5)
MCV RBC AUTO: 74.8 FL (ref 80–99)
O2/TOTAL GAS SETTING VFR VENT: 28 %
P-R INTERVAL, ECG05: 172 MS
PCO2 BLDV: 42.2 MMHG (ref 41–51)
PCO2 BLDV: 45.9 MMHG (ref 41–51)
PH BLDV: 7.46 [PH] (ref 7.32–7.42)
PH BLDV: 7.47 [PH] (ref 7.32–7.42)
PHOSPHATE SERPL-MCNC: 2.5 MG/DL (ref 2.6–4.7)
PLATELET # BLD AUTO: 174 K/UL (ref 150–400)
PO2 BLDV: 25 MMHG (ref 25–40)
PO2 BLDV: 27 MMHG (ref 25–40)
POTASSIUM SERPL-SCNC: 3.9 MMOL/L (ref 3.5–5.1)
POTASSIUM SERPL-SCNC: 3.9 MMOL/L (ref 3.5–5.1)
POTASSIUM SERPL-SCNC: 4 MMOL/L (ref 3.5–5.1)
POTASSIUM SERPL-SCNC: 4.1 MMOL/L (ref 3.5–5.1)
PROT SERPL-MCNC: 6.1 G/DL (ref 6.4–8.2)
PROTHROMBIN TIME: 13.5 SEC (ref 9–11.1)
Q-T INTERVAL, ECG07: 358 MS
QRS DURATION, ECG06: 112 MS
QTC CALCULATION (BEZET), ECG08: 459 MS
RBC # BLD AUTO: 4.72 M/UL (ref 4.1–5.7)
SAO2 % BLDV: 49 % (ref 65–88)
SAO2 % BLDV: 54 % (ref 65–88)
SERVICE CMNT-IMP: ABNORMAL
SODIUM SERPL-SCNC: 124 MMOL/L (ref 136–145)
SODIUM SERPL-SCNC: 125 MMOL/L (ref 136–145)
SPECIMEN TYPE: ABNORMAL
SPECIMEN TYPE: ABNORMAL
THERAPEUTIC RANGE,PTTT: ABNORMAL SECS (ref 58–77)
THERAPEUTIC RANGE,PTTT: ABNORMAL SECS (ref 58–77)
TOTAL RESP. RATE, ITRR: 16
VENTRICULAR RATE, ECG03: 99 BPM
WBC # BLD AUTO: 6.6 K/UL (ref 4.1–11.1)

## 2017-01-23 PROCEDURE — 74011250636 HC RX REV CODE- 250/636: Performed by: NURSE PRACTITIONER

## 2017-01-23 PROCEDURE — 65610000003 HC RM ICU SURGICAL

## 2017-01-23 PROCEDURE — 36415 COLL VENOUS BLD VENIPUNCTURE: CPT | Performed by: NURSE PRACTITIONER

## 2017-01-23 PROCEDURE — 80053 COMPREHEN METABOLIC PANEL: CPT | Performed by: NURSE PRACTITIONER

## 2017-01-23 PROCEDURE — 93306 TTE W/DOPPLER COMPLETE: CPT

## 2017-01-23 PROCEDURE — 77010033678 HC OXYGEN DAILY

## 2017-01-23 PROCEDURE — 74011250637 HC RX REV CODE- 250/637: Performed by: NURSE PRACTITIONER

## 2017-01-23 PROCEDURE — 85730 THROMBOPLASTIN TIME PARTIAL: CPT | Performed by: THORACIC SURGERY (CARDIOTHORACIC VASCULAR SURGERY)

## 2017-01-23 PROCEDURE — 84132 ASSAY OF SERUM POTASSIUM: CPT | Performed by: NURSE PRACTITIONER

## 2017-01-23 PROCEDURE — 80069 RENAL FUNCTION PANEL: CPT | Performed by: INTERNAL MEDICINE

## 2017-01-23 PROCEDURE — 85610 PROTHROMBIN TIME: CPT | Performed by: NURSE PRACTITIONER

## 2017-01-23 PROCEDURE — 74011250636 HC RX REV CODE- 250/636

## 2017-01-23 PROCEDURE — 74011250636 HC RX REV CODE- 250/636: Performed by: THORACIC SURGERY (CARDIOTHORACIC VASCULAR SURGERY)

## 2017-01-23 PROCEDURE — 93005 ELECTROCARDIOGRAM TRACING: CPT

## 2017-01-23 PROCEDURE — 83880 ASSAY OF NATRIURETIC PEPTIDE: CPT | Performed by: NURSE PRACTITIONER

## 2017-01-23 PROCEDURE — 83735 ASSAY OF MAGNESIUM: CPT | Performed by: NURSE PRACTITIONER

## 2017-01-23 PROCEDURE — 74011000250 HC RX REV CODE- 250: Performed by: THORACIC SURGERY (CARDIOTHORACIC VASCULAR SURGERY)

## 2017-01-23 PROCEDURE — 74011000258 HC RX REV CODE- 258: Performed by: THORACIC SURGERY (CARDIOTHORACIC VASCULAR SURGERY)

## 2017-01-23 PROCEDURE — 82962 GLUCOSE BLOOD TEST: CPT

## 2017-01-23 PROCEDURE — 85027 COMPLETE CBC AUTOMATED: CPT | Performed by: NURSE PRACTITIONER

## 2017-01-23 PROCEDURE — 77030009526 HC GEL CARSYN MDII -A

## 2017-01-23 PROCEDURE — 74011250637 HC RX REV CODE- 250/637: Performed by: THORACIC SURGERY (CARDIOTHORACIC VASCULAR SURGERY)

## 2017-01-23 PROCEDURE — 74011636637 HC RX REV CODE- 636/637: Performed by: NURSE PRACTITIONER

## 2017-01-23 PROCEDURE — 97110 THERAPEUTIC EXERCISES: CPT

## 2017-01-23 PROCEDURE — 77030011256 HC DRSG MEPILEX <16IN NO BORD MOLN -A

## 2017-01-23 PROCEDURE — 71010 XR CHEST PORT: CPT

## 2017-01-23 PROCEDURE — 82803 BLOOD GASES ANY COMBINATION: CPT

## 2017-01-23 PROCEDURE — 97165 OT EVAL LOW COMPLEX 30 MIN: CPT

## 2017-01-23 PROCEDURE — 74011000258 HC RX REV CODE- 258: Performed by: NURSE PRACTITIONER

## 2017-01-23 RX ORDER — AMOXICILLIN 250 MG
1 CAPSULE ORAL DAILY
Status: DISCONTINUED | OUTPATIENT
Start: 2017-01-23 | End: 2017-01-31

## 2017-01-23 RX ORDER — POTASSIUM CHLORIDE 29.8 MG/ML
20 INJECTION INTRAVENOUS ONCE
Status: COMPLETED | OUTPATIENT
Start: 2017-01-23 | End: 2017-01-25

## 2017-01-23 RX ORDER — MAGNESIUM SULFATE 1 G/100ML
1 INJECTION INTRAVENOUS ONCE
Status: COMPLETED | OUTPATIENT
Start: 2017-01-23 | End: 2017-01-25

## 2017-01-23 RX ORDER — TRAZODONE HYDROCHLORIDE 50 MG/1
50 TABLET ORAL
Status: DISCONTINUED | OUTPATIENT
Start: 2017-01-23 | End: 2017-01-26

## 2017-01-23 RX ORDER — POTASSIUM CHLORIDE 29.8 MG/ML
INJECTION INTRAVENOUS
Status: COMPLETED
Start: 2017-01-23 | End: 2017-01-23

## 2017-01-23 RX ORDER — MAGNESIUM SULFATE 1 G/100ML
1 INJECTION INTRAVENOUS ONCE
Status: COMPLETED | OUTPATIENT
Start: 2017-01-23 | End: 2017-01-23

## 2017-01-23 RX ADMIN — OXYCODONE HYDROCHLORIDE AND ACETAMINOPHEN 2 TABLET: 5; 325 TABLET ORAL at 20:08

## 2017-01-23 RX ADMIN — VANCOMYCIN HYDROCHLORIDE 1250 MG: 10 INJECTION, POWDER, LYOPHILIZED, FOR SOLUTION INTRAVENOUS at 09:08

## 2017-01-23 RX ADMIN — MILRINONE LACTATE 0.38 MCG/KG/MIN: 200 INJECTION, SOLUTION INTRAVENOUS at 05:54

## 2017-01-23 RX ADMIN — INSULIN GLARGINE 10 UNITS: 100 INJECTION, SOLUTION SUBCUTANEOUS at 09:08

## 2017-01-23 RX ADMIN — Medication 10 ML: at 22:19

## 2017-01-23 RX ADMIN — HEPARIN SODIUM AND DEXTROSE 24 UNITS/KG/HR: 10000; 5 INJECTION INTRAVENOUS at 11:08

## 2017-01-23 RX ADMIN — POTASSIUM CHLORIDE 20 MEQ: 400 INJECTION, SOLUTION INTRAVENOUS at 20:13

## 2017-01-23 RX ADMIN — INSULIN LISPRO 2 UNITS: 100 INJECTION, SOLUTION INTRAVENOUS; SUBCUTANEOUS at 12:38

## 2017-01-23 RX ADMIN — Medication 10 ML: at 15:28

## 2017-01-23 RX ADMIN — POTASSIUM CHLORIDE 20 MEQ: 29.8 INJECTION INTRAVENOUS at 07:33

## 2017-01-23 RX ADMIN — DOCUSATE SODIUM AND SENNOSIDES 1 TABLET: 8.6; 5 TABLET, FILM COATED ORAL at 17:29

## 2017-01-23 RX ADMIN — MILRINONE LACTATE 0.38 MCG/KG/MIN: 200 INJECTION, SOLUTION INTRAVENOUS at 18:09

## 2017-01-23 RX ADMIN — AMIODARONE HYDROCHLORIDE 1 MG/MIN: 50 INJECTION, SOLUTION INTRAVENOUS at 17:16

## 2017-01-23 RX ADMIN — Medication 2 MG: at 22:05

## 2017-01-23 RX ADMIN — INSULIN LISPRO 4 UNITS: 100 INJECTION, SOLUTION INTRAVENOUS; SUBCUTANEOUS at 08:46

## 2017-01-23 RX ADMIN — POTASSIUM CHLORIDE 20 MEQ: 400 INJECTION, SOLUTION INTRAVENOUS at 07:33

## 2017-01-23 RX ADMIN — OXYCODONE HYDROCHLORIDE AND ACETAMINOPHEN 2 TABLET: 5; 325 TABLET ORAL at 16:13

## 2017-01-23 RX ADMIN — MAGNESIUM SULFATE HEPTAHYDRATE 1 G: 1 INJECTION, SOLUTION INTRAVENOUS at 03:06

## 2017-01-23 RX ADMIN — HEPARIN SODIUM AND DEXTROSE 24 UNITS/KG/HR: 10000; 5 INJECTION INTRAVENOUS at 23:41

## 2017-01-23 RX ADMIN — INSULIN LISPRO 2 UNITS: 100 INJECTION, SOLUTION INTRAVENOUS; SUBCUTANEOUS at 17:29

## 2017-01-23 RX ADMIN — SODIUM CHLORIDE 10 ML/HR: 900 INJECTION, SOLUTION INTRAVENOUS at 05:18

## 2017-01-23 RX ADMIN — OXYCODONE HYDROCHLORIDE AND ACETAMINOPHEN 2 TABLET: 5; 325 TABLET ORAL at 03:04

## 2017-01-23 RX ADMIN — OXYCODONE HYDROCHLORIDE AND ACETAMINOPHEN 2 TABLET: 5; 325 TABLET ORAL at 07:29

## 2017-01-23 RX ADMIN — Medication 10 ML: at 05:17

## 2017-01-23 RX ADMIN — OXYCODONE HYDROCHLORIDE AND ACETAMINOPHEN 2 TABLET: 5; 325 TABLET ORAL at 23:41

## 2017-01-23 RX ADMIN — OXYCODONE HYDROCHLORIDE AND ACETAMINOPHEN 2 TABLET: 5; 325 TABLET ORAL at 11:43

## 2017-01-23 RX ADMIN — Medication 81 MG: at 08:48

## 2017-01-23 RX ADMIN — Medication 400 MG: at 08:48

## 2017-01-23 RX ADMIN — LEVOFLOXACIN 750 MG: 5 INJECTION, SOLUTION INTRAVENOUS at 15:25

## 2017-01-23 RX ADMIN — TRAZODONE HYDROCHLORIDE 50 MG: 50 TABLET ORAL at 21:17

## 2017-01-23 RX ADMIN — AMIODARONE HYDROCHLORIDE 1 MG/MIN: 50 INJECTION, SOLUTION INTRAVENOUS at 09:54

## 2017-01-23 RX ADMIN — SODIUM CHLORIDE 10 ML/HR: 900 INJECTION, SOLUTION INTRAVENOUS at 05:54

## 2017-01-23 RX ADMIN — MAGNESIUM SULFATE HEPTAHYDRATE 1 G: 1 INJECTION, SOLUTION INTRAVENOUS at 20:53

## 2017-01-23 RX ADMIN — AMIODARONE HYDROCHLORIDE 150 MG: 50 INJECTION, SOLUTION INTRAVENOUS at 09:55

## 2017-01-23 RX ADMIN — SODIUM CHLORIDE 0.5 MG/HR: 900 INJECTION, SOLUTION INTRAVENOUS at 05:04

## 2017-01-23 RX ADMIN — INSULIN LISPRO 1 UNITS: 100 INJECTION, SOLUTION INTRAVENOUS; SUBCUTANEOUS at 21:25

## 2017-01-23 NOTE — PROGRESS NOTES
NUTRITION COMPLETE ASSESSMENT    RECOMMENDATIONS:   Increase insulin coverage (DTC recommended 16 units Lantus and adding Amaryl 2 mg)     Interventions/Plan:   Follow    Assessment:   Reason for Assessment:   [x] Provider Consult-General Nutrition management and supplements    Diet: Consistent carb 1800 kcal, Cardiac  Supplements:none  Nutritionally Significant Medications: [x] Reviewed & Includes: KCL, Lantus, magnesium oxide, correction scale insulin, Bumex drip  Meal Intake: Patient Vitals for the past 100 hrs:   % Diet Eaten   01/22/17 1500 100 %   01/21/17 1200 90 %       Subjective: On a scale of 0 to 100 I feel 85% better (since admission). No appetite PTA. Taste is returning since no longer smoking. Objective:  Mr Juwan Sanders was admitted with Systolic heart failure. Noted: Severe CAD and acute on chronic systolic heart failure (EF 5-10%); CHACORTA on CKD 2/2 cardiorenal syndrome; new diagnosis DM 2. PMHx: alcohol and tobacco abuse, CHF. MST negative on admission however pt reports poor appetite PTA. Pt has lost 5 kg (11# ) since admission due diuresis. UBW of 180# noted by patient but question if this includes edema. Currently within IBW range but still has 1+ edema of LE's (?dry weight). Appetite has returned (see above) since pt admitted to hospital. Patient reports feeling much better and food tasting better as well since no longer smoking. Appears motivated to take better care of himself. PO supplement not indicated at this time d/t good po intake. Add Glucerna shake prn. Blood glucose remains elevated-recommend increasing coverage per DTC recommendations earlier today. Magnesium and potassium being replaced. BUN and creatinine elevated but stable (?baseline). Estimated Nutrition Needs:   Kcals/day: 2030 Kcals/day  Protein:  (77-92 (1.0-1.2g/kg))  Fluid: 1540 ml (20 ml/kg)  Based On:  [facility] (MSJ x 1.3)  Weight Used: Actual wt    Pt expected to meet estimated nutrient needs:  [x]   Yes []  No  [] Unable to predict at this time    Nutrition Diagnosis:   1. Inadequate oral food/beverage intake related to severe CAD and acute on chronic heart failure as evidenced by poor appetite PTA (now resolved). 2. Altered nutrition-related lab values related to new diagnosis DM 2 as evidenced by BG >200 mg/dl. Goals:     Consume at least 75% of meals for next 5-7 days. Monitoring & Evaluation:    - Total energy intake   - Glucose profile, Weight/weight change    Previous Nutrition Goals Met:  N/A  Previous Recommendations:      N/A    Education & Discharge Needs:   [x] None Identified   [] Identified and addressed    [x] Participated in care plan, discharge planning, and/or interdisciplinary rounds        Cultural, Tenriism and ethnic food preferences identified:   None    Skin Integrity: [x]Intact  []Other  Edema: []None [x] Generalized trace, 1+ weeping BLE's  Last BM: 1/21  Food Allergies: [x]None []Other    Anthropometrics:    Weight Loss Metrics 1/23/2017 1/20/2017 1/20/2017 1/18/2017   Today's Wt 169 lb 12.1 oz - 178 lb 9.2 oz -   BMI - 24.71 kg/m2 - 25.99 kg/m2      Last 3 Recorded Weights in this Encounter    01/22/17 0800 01/23/17 0700 01/23/17 1338   Weight: 78.5 kg (173 lb 1 oz) 77 kg (169 lb 12.1 oz) 77 kg (169 lb 12.1 oz)      Weight Source: Standing scale (comment)  Height: 5' 9.5\" (176.5 cm),    Body mass index is 24.71 kg/(m^2).   IBW : 73.9 kg (163 lb), % IBW (Calculated): 104.14 %   ,      Labs:  Lab Results   Component Value Date/Time    Sodium 124 01/23/2017 12:09 PM    Potassium 4.1 01/23/2017 12:09 PM    Chloride 84 01/23/2017 12:09 PM    CO2 33 01/23/2017 12:09 PM    Glucose 214 01/23/2017 12:09 PM    BUN 28 01/23/2017 12:09 PM    Creatinine 1.60 01/23/2017 12:09 PM    Calcium 8.5 01/23/2017 12:09 PM    Magnesium 2.1 01/23/2017 12:09 PM    Phosphorus 2.5 01/23/2017 12:09 PM    Albumin 2.8 01/23/2017 12:09 PM     Lab Results   Component Value Date/Time    Hemoglobin A1c 13.5 01/19/2017 05:05 PM     Lab Results   Component Value Date/Time    Glucose (POC) 238 01/23/2017 12:18 PM      Lab Results   Component Value Date/Time    ALT 9 01/23/2017 05:46 AM    AST 14 01/23/2017 05:46 AM    Alk.  phosphatase 87 01/23/2017 05:46 AM    Bilirubin, total 0.8 01/23/2017 05:46 AM        [facility]in Ness [facility]

## 2017-01-23 NOTE — PROGRESS NOTES
Chart reviewed by Heart Failure Nurse Navigator. Heart Failure database completed. EF 5/10%. ACEi/ARB: Contraindicated CHACORTA. BB: Currently on Milrinone. CRT . NYHA Functional Class IV. Heart Failure Teach Back in Patient Education. Heart Failure Avoiding Triggers on Discharge Instructions.

## 2017-01-23 NOTE — PROGRESS NOTES
0800: Assumed care of patient from off going nurse Demetrius Conn. RN  0830: Ellen Schmidt NP and Dr. Lazara Garza  at bedside, update on patient status, orders to turn off vasopressin, and to give an amio bolus, start amiodarone drip at a rate of 1mg. Will have a bedside Echo today and ID will consult. 1000: Wound care nurse at bedside, Applied orme Rubye Guppy to bilateral lower legs, right leg has 4 mepilex borders applied, and left leg has 1 to left lateral ankle . Wound care instructions to follow with order  1200: Bedside echo, patient tolerated well  1300: Dr. Zoie De Leon at bedside, updated on patient status, no new orders at this time  1700: Afternoon rounds with Dr. Zabrina Rahman NP, and Dr. Lazara Garza, updated on patient status, no new orders at this time. 2000:Bedside and Verbal shift change report given to Doctor Kimmie Zendejas (oncoming nurse) by Farhan Moore RN (offgoing nurse). Report included the following information SBAR, Intake/Output, MAR and Recent Results.

## 2017-01-23 NOTE — PROGRESS NOTES
Cardiac Surgery Care Coordinator-  Met with Fernanda Rose. and his wife, Introduced role of the Cardiac Surgery Co-. Reviewed plan of care and began pre-op education, Mr Paola Pacheco has a good understanding of the plan to complete pre-op testing. Discussed day of surgery expectations for the pt and family. Encouraged Fernanda Kraft and his wife to verbalize and offered emotional support.  Lolita Cline RN

## 2017-01-23 NOTE — PROGRESS NOTES
CM met with patient and wife- patient gave verbal consent for information to be shared with him or his wife - they live in a one story rancher home with 4 steps to enter- patient uses no DME and has been independent with ADL's- he uses to be a  when he use to work - he has not been to an MD in 3 years since he was seen at [facility] - he has two adult children who live locally - has never had home health or rehab - had a inpatient substance tx stay over 25 years ago in [address] [place] [address] - uses [facility] in [place] for his meds- expressed no concerns regarding affording meds currently- he is agreeable to home care upon discharge if needed and choice obtained for [facility] post-op- patient would like to be screened for disability -referral made to [facility] in [facility] to screen for disability -provided website info for medication assistance if needed - patient given choice of free clinic vs [facility] and he would prefer [facility] and referral made to CM specialist to follow-up- wife works long hours during the week with her job- wife will be able to assist with transportation upon discharge to home. HERRERA Garcia      Care Management Interventions  PCP Verified by CM:  Yes (has no PCP- will need to explore free clinilc vs [facility])  MyChart Signup: No  Discharge Durable Medical Equipment: No (uses no DME)  Physical Therapy Consult: Yes  Occupational Therapy Consult: Yes  Speech Therapy Consult: No  Current Support Network: Lives with Spouse (Lives with wife who works long hours during the week outside the home - patient also has 2 adult children)  Confirm Follow Up Transport: Family  Plan discussed with Pt/Family/Caregiver: Yes  Freedom of Choice Offered: Yes  Discharge Location  Discharge Placement: Home with home health

## 2017-01-23 NOTE — PROGRESS NOTES
Bedside and Verbal shift change report given to Charbel Aguirre 84 and [employer] student nurse by Lorraine Beasley RN. Report included the following information SBAR, Kardex, Intake/Output, MAR, Accordion, Recent Results and Cardiac Rhythm sinus tachycardia. Call bell and personal items within patient's reach.       Problem: Falls - Risk of  Goal: *Absence of falls  Outcome: Progressing Towards Goal  Bed in low and locked position; call bell and personal belongings within reach, side rails up x3, nonskid footwear, fall precaution bracelet and door identifier in place  Goal: *Knowledge of fall prevention  Outcome: Progressing Towards Goal  Patient educated on use of call bell and instructed to call for assistance; patient verbalized understanding of instructions     Problem: Pressure Ulcer - Risk of  Goal: *Prevention of pressure ulcer  Outcome: Progressing Towards Goal  Pressure ulcer risk assessment tool in use; Patient turned q2, pillows and pressure reducing mattress in use; blood glucose monitored ac/hs; patient encouraged to move independently every two hours        Problem: Heart Failure: Day 3  Goal: *Oxygen saturation within defined limits  Outcome: Progressing Towards Goal  SpO2 maintained >94% on 1-2L NC  Goal: *Hemodynamically stable  Outcome: Progressing Towards Goal  Patient requiring milrinone and vasopressin to remain hemodynamically stable with CI 1.3-3; SvO2 55-60; PA pressures elevated but stable  Goal: *Optimal pain control at patients stated goal  Outcome: Progressing Towards Goal  Pain controlled with PRN percocet and morphine  Goal: *Demonstrates progressive activity  Outcome: Progressing Towards Goal  Able to ambulate OOB to chair with 2 assist; educated on future possibility of sternal precautions

## 2017-01-23 NOTE — PROGRESS NOTES
Problem: Self Care Deficits Care Plan (Adult)  Goal: *Acute Goals and Plan of Care (Insert Text)  Occupational Therapy Goals  Initiated 1/23/2017  1. Patient will perform lower body dressing with modified independence within 7 day(s). 2. Patient will perform standing ADLs 10 mins with supervision/set-up within 7 day(s). 3. Patient will perform bathing with supervision/set-up within 7 day(s). 4. Patient will perform PRN BSC / toilet transfers with supervision/set-up within 7 day(s). 5. Patient will perform all aspects of toileting with supervision/set-up within 7 day(s). 6. Patient will participate in upper extremity therapeutic exercise/activities with medium resistance band supervision/set-up for 5 minutes within 7 day(s). OCCUPATIONAL THERAPY EVALUATION  Patient: Magdaleno Nichols (64 y.o. male)  Date: 1/23/2017  Primary Diagnosis: CAD  Systolic heart failure ([address]        Precautions:          ASSESSMENT :  Based on the objective data described below, the patient presents with independent to moderate A ADLs. ADLs limited by endurance/cardiopulmonary tolerance (EF 10%), strength, ROM, neuropathy B feet, pain and swelling B LEs. Recommend with nursing patient to complete as able in order to maintain strength, endurance and independence: ADLs with supervision/setup, OOB to chair 3x/day and mobilizing to the bathroom for toileting with 1 assist. Thank you for your assistance. Patient will benefit from skilled intervention to address the above impairments.   Patients rehabilitation potential is considered to be Good  Factors which may influence rehabilitation potential include:   [X]             None noted  [ ]             Mental ability/status  [ ]             Medical condition  [ ]             Home/family situation and support systems  [ ]             Safety awareness  [ ]             Pain tolerance/management  [ ]             Other:        PLAN :  Recommendations and Planned Interventions:  [X] Self Care Training                  [X]        Therapeutic Activities  [X]               Functional Mobility Training    [ ]        Cognitive Retraining  [X]               Therapeutic Exercises           [X]        Endurance Activities  [X]               Balance Training                   [ ]        Neuromuscular Re-Education  [ ]               Visual/Perceptual Training     [X]   Home Safety Training  [X]               Patient Education                 [X]        Family Training/Education  [ ]               Other (comment):     Frequency/Duration: Patient will be followed by occupational therapy 5 times a week to address goals. Discharge Recommendations: Rehab and To Be Determined  Further Equipment Recommendations for Discharge: TBD       SUBJECTIVE:   Patient stated I have been in bed for a while.       OBJECTIVE DATA SUMMARY:   HISTORY:   Past Medical History   Diagnosis Date    Cardiomyopathy ([facility] [address] 1/20/2017       A. Echo (1/19/17):  EF 5-10% with severe GHK,. Mildly dil LA. Mild TR. PASP 46. No past surgical history on file. 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    Expanded or extensive additional review of patient history:      Home Situation  Home Environment: Private residence  # Steps to Enter: 4  Rails to Enter: No  One/Two Story Residence: One story  Living Alone: No  Support Systems: Spouse/Significant Other/Partner, Child(duane)  Patient Expects to be Discharged to[de-identified] Private residence  Current DME Used/Available at Home: Blood pressure cuff, Raised toilet seat, Walker, rolling, Cane, straight  Tub or Shower Type: Shower  [X]  Right hand dominant             [ ]  Left hand dominant     EXAMINATION OF PERFORMANCE DEFICITS:  Cognitive/Behavioral Status:  Neurologic State: Alert  Orientation Level: Oriented X4  Cognition: Appropriate decision making           Skin: intact R Hickman Val, R AIV, mendoza cath; B knees to feet red, yellow and purple  Edema: B knees-feet noted pitting edema however not tested 2* pain to touch  Vision/Perceptual:                           Acuity: Impaired near vision    Corrective Lenses: Reading glasses  Range of Motion:  B UEs shoulder flexion 90* 2* Hickman , elbows-digits WDL                          Strength:  B shoulder flexion -3/5, elbows-digits +3/4                 Coordination:     Fine Motor Skills-Upper: Left Intact; Right Intact    Gross Motor Skills-Upper: Left Intact; Right Intact  Tone & Sensation:        Sensation: Impaired (L hand cramps, B feet to ankles neuropathy)                       Balance:  Sitting: Intact; Without support     Functional Mobility and Transfers for ADLs:  Bed Mobility:  Rolling: Independent     Transfers:        ADL Assessment:  Feeding: Independent reported     Oral Facial Hygiene/Grooming: Supervision report setup on beside tray, sitting up in bed/chair     Bathing: Moderate assistance infer from ROM and weight shift in bed     Upper Body Dressing: Minimum assistance lines and leads limiting     Lower Body Dressing: Moderate assistance tailor sitting B LEs while in bed, able to touch B feet with hands in mock doff and don socks; hold 10 seconds each     Toileting: Moderate assistance weight shift side to side, B UE IR intact; unclear standing balance                 ADL Intervention and task modifications:                                                  Therapeutic Exercise:  Patient instructed therapeutic exercise shoulder - digits all planes with medium resistance band to increase functional transfers and independence with ADLs; patient stating no need to demonstrate as he feels he understands.  Patient provided with written and picture handout of exercise program.            Pain:  Pain Scale 1: Numeric (0 - 10)  Pain Intensity 1: 6  Pain Location 1: Ankle  Pain Orientation 1: Lateral  Pain Description 1: Aching;Constant; Hypersensitivity  Pain Intervention(s) 1: Medication (see MAR)  Activity Tolerance:   VSS  Please refer to the flowsheet for vital signs taken during this treatment. After treatment:   [ ] Patient left in no apparent distress sitting up in chair  [X] Patient left in no apparent distress in bed  [X] Call bell left within reach  [X] Nursing notified  [ ] Caregiver present  [ ] Bed alarm activated      COMMUNICATION/EDUCATION:   The patients plan of care was discussed with: Physical Therapist and Registered Nurse.  [X] Home safety education was provided and the patient/caregiver indicated understanding. [X] Patient/family have participated as able in goal setting and plan of care. [X] Patient/family agree to work toward stated goals and plan of care. [ ] Patient understands intent and goals of therapy, but is neutral about his/her participation. [ ] Patient is unable to participate in goal setting and plan of care. This patients plan of care is appropriate for delegation to [facility] [place].      Thank you for this referral.  Jami Somers  Time Calculation: 25 mins

## 2017-01-23 NOTE — WOUND CARE
Wound and Skin Consult / New Consult for bilateral lower extremities weeping wounds- reviewed chart, assessed patient and spoke with nurse, Jett Yoon. Jett Yoon present for assessment. Patient alert but question orientation at times. Complains of pain with leg movement but otherwise no pain. Glenna Jameson aware of wound recommendations. Assessment-supine in bed with head of bed elevated. 1. Sacrum is clear. 2. Heels are dry but geri. 3. Left and right lower leg with rash and wounds: right worse than left. Bilateral pulses are palpable. Left red rash is: 28.0cm x 24.0cm x 0.1cm / achilles with scattered minute sloughed areas. Leg not weeping. Right worse red rash: 36.0cm x 30.0cmx 0.1cm / shin with minute scattered areas of slough and also lower lateral      And medial leg. Cleaned both legs with Makayla Klenz Spray, applied small amount of Carrasyn Gel to sloughed areas and a mepilex      Border dressing. Patient and nurses agree legs are improving. Recommendations-    1. Patient is on a Total Care Upgrade for continuous air flow and pressure redistribution. Use only Air Flow chuxs and draw sheet under patient. 2. Reposition:  Continue to turn every 1-2hrs to reposition for pressure relief, with pillows to protect bony prominences/float heels  / needs assistance with legs. 3.Continence/ mendoza in and asks for bedpan. 4. Continue to monitor pain, nutrition and skin assessment. 5. Wound Care-      - daily clean bilateral lower extremities with Makayla Klenz Spray, dry, apply thin film of carrasyn gel to right shin, right        And left lower (lateral and medial) leg and left achilles.     Latricia Hartmann RN/WOCN

## 2017-01-23 NOTE — PROGRESS NOTES
Problem: Falls - Risk of  Goal: *Absence of falls  Outcome: Progressing Towards Goal  Demonstrates appropriate use of call bell and fall prevention             Goal: *Knowledge of fall prevention  Outcome: Progressing Towards Goal  Understands use of call bell and calls for help when needed to rpevent falls    Problem: Pressure Ulcer - Risk of  Goal: *Prevention of pressure ulcer  Outcome: Progressing Towards Goal  Areas of pressure offloaded with use of pillows and turning often    Problem: Heart Failure: Day 4  Goal: *Oxygen saturation within defined limits  Outcome: Progressing Towards Goal  Currently on 2L NC. Goal: *Hemodynamically stable  Outcome: Progressing Towards Goal  BP is stable.    Goal: *Optimal pain control at patients stated goal  Outcome: Progressing Towards Goal  Using numeric scale to rate pain

## 2017-01-23 NOTE — PROGRESS NOTES
[facility]  Progress Note      Date: January 23, 2017     Admit Date: 1/20/2017    Severe ischemic cardiomyopathy (EF 5-10%)       Subjective:   Feels fine this am.  No real issues overnight. Needs ECHO today.       Objective:     Visit Vitals    /63 (BP 1 Location: Left arm, BP Patient Position: At rest)    Pulse (!) 104    Temp 97.5 °F (36.4 °C)    Resp 20    Wt 169 lb 12.1 oz (77 kg)    SpO2 97%    BMI 24.71 kg/m2        Oxygen Therapy:  Oxygen Therapy  O2 Sat (%): 97 % (01/23/17 1200)  Pulse via Oximetry: 103 beats per minute (01/23/17 1200)  O2 Device: Nasal cannula (01/23/17 1200)  O2 Flow Rate (L/min): 2 l/min (01/23/17 1200)    CXR: persistent LLL opacification     Admission Weight: Last Weight   Weight: 178 lb 2.1 oz (80.8 kg) Weight: 169 lb 12.1 oz (77 kg)     Negative about 10 liters since admission          Intake / Output / Drain:  Last 24 hrs.:   Intake/Output Summary (Last 24 hours) at 01/23/17 1250  Last data filed at 01/23/17 1200   Gross per 24 hour   Intake          2215.87 ml   Output             3875 ml   Net         -1659.13 ml       EXAM:  Neuro: A&O  Resp: CTA  CV: tachy  GI: +BS Soft NT/ND  : voiding  Ext: erythematous right leg    Recent Results (from the past 24 hour(s))   GLUCOSE, POC    Collection Time: 01/22/17  5:09 PM   Result Value Ref Range    Glucose (POC) 298 (H) 65 - 100 mg/dL    Performed by Chloe Hope    POTASSIUM    Collection Time: 01/22/17  6:29 PM   Result Value Ref Range    Potassium 3.7 3.5 - 5.1 mmol/L   MAGNESIUM    Collection Time: 01/22/17  6:29 PM   Result Value Ref Range    Magnesium 1.8 1.6 - 2.4 mg/dL   PTT    Collection Time: 01/22/17  6:29 PM   Result Value Ref Range    aPTT 64.1 (H) 22.1 - 32.5 sec    aPTT, therapeutic range     58.0 - 77.0 SECS   GLUCOSE, POC    Collection Time: 01/22/17 10:45 PM   Result Value Ref Range    Glucose (POC) 234 (H) 65 - 100 mg/dL    Performed by Cristofer Ashton Collection Time: 01/23/17  1:11 AM   Result Value Ref Range    Potassium 4.0 3.5 - 5.1 mmol/L   MAGNESIUM    Collection Time: 01/23/17  1:11 AM   Result Value Ref Range    Magnesium 1.8 1.6 - 2.4 mg/dL   PTT    Collection Time: 01/23/17  1:11 AM   Result Value Ref Range    aPTT 75.8 (H) 22.1 - 32.5 sec    aPTT, therapeutic range     58.0 - 77.0 SECS   POC VENOUS BLOOD GAS    Collection Time: 01/23/17  5:45 AM   Result Value Ref Range    Device: NASAL CANNULA      Flow rate (POC) 3 L/M    pH, venous (POC) 7.475 (H) 7.32 - 7.42      pCO2, venous (POC) 45.9 41 - 51 MMHG    pO2, venous (POC) 27 25 - 40 mmHg    HCO3, venous (POC) 33.8 (H) 23.0 - 28.0 MMOL/L    sO2, venous (POC) 54 (L) 65 - 88 %    Base excess, venous (POC) 10 mmol/L    Allens test (POC) N/A      Site SWAN TAVIA      Specimen type (POC) MIXED VENOUS     METABOLIC PANEL, COMPREHENSIVE    Collection Time: 01/23/17  5:46 AM   Result Value Ref Range    Sodium 125 (L) 136 - 145 mmol/L    Potassium 3.9 3.5 - 5.1 mmol/L    Chloride 84 (L) 97 - 108 mmol/L    CO2 35 (H) 21 - 32 mmol/L    Anion gap 6 5 - 15 mmol/L    Glucose 244 (H) 65 - 100 mg/dL    BUN 27 (H) 6 - 20 MG/DL    Creatinine 1.63 (H) 0.70 - 1.30 MG/DL    BUN/Creatinine ratio 17 12 - 20      GFR est AA 52 (L) >60 ml/min/1.73m2    GFR est non-AA 43 (L) >60 ml/min/1.73m2    Calcium 8.4 (L) 8.5 - 10.1 MG/DL    Bilirubin, total 0.8 0.2 - 1.0 MG/DL    ALT 9 (L) 12 - 78 U/L    AST 14 (L) 15 - 37 U/L    Alk.  phosphatase 87 45 - 117 U/L    Protein, total 6.1 (L) 6.4 - 8.2 g/dL    Albumin 2.6 (L) 3.5 - 5.0 g/dL    Globulin 3.5 2.0 - 4.0 g/dL    A-G Ratio 0.7 (L) 1.1 - 2.2     MAGNESIUM    Collection Time: 01/23/17  5:46 AM   Result Value Ref Range    Magnesium 2.1 1.6 - 2.4 mg/dL   CBC W/O DIFF    Collection Time: 01/23/17  5:46 AM   Result Value Ref Range    WBC 6.6 4.1 - 11.1 K/uL    RBC 4.72 4.10 - 5.70 M/uL    HGB 11.8 (L) 12.1 - 17.0 g/dL    HCT 35.3 (L) 36.6 - 50.3 %    MCV 74.8 (L) 80.0 - 99.0 FL    MCH 25.0 (L) 26.0 - 34.0 PG    MCHC 33.4 30.0 - 36.5 g/dL    RDW 15.4 (H) 11.5 - 14.5 %    PLATELET 873 583 - 833 K/uL   PRO-BNP    Collection Time: 01/23/17  5:46 AM   Result Value Ref Range    NT pro-BNP 7086 (H) 0 - 125 PG/ML   GLUCOSE, POC    Collection Time: 01/23/17  7:09 AM   Result Value Ref Range    Glucose (POC) 309 (H) 65 - 100 mg/dL    Performed by Sherry Jackson    EKG, 12 LEAD, INITIAL    Collection Time: 01/23/17 10:13 AM   Result Value Ref Range    Ventricular Rate 99 BPM    Atrial Rate 99 BPM    P-R Interval 172 ms    QRS Duration 112 ms    Q-T Interval 358 ms    QTC Calculation (Bezet) 459 ms    Calculated P Axis 53 degrees    Calculated R Axis 73 degrees    Calculated T Axis -63 degrees    Diagnosis       Normal sinus rhythm  Possible Left atrial enlargement  T wave abnormality, consider inferolateral ischemia  When compared with ECG of 18-JAN-2017 23:07,  premature ventricular complexes are no longer present  QRS duration has decreased  Inverted T waves have replaced nonspecific T wave abnormality in Inferior   leads     GLUCOSE, POC    Collection Time: 01/23/17 12:18 PM   Result Value Ref Range    Glucose (POC) 238 (H) 65 - 100 mg/dL    Performed by Cherrie Hurd        Current Facility-Administered Medications:     amiodarone (CORDARONE) 450 mg in dextrose 5% 250 mL infusion, 0.5-1 mg/min, IntraVENous, TITRATE, Del Erika, NP, Last Rate: 33.3 mL/hr at 01/23/17 0954, 1 mg/min at 01/23/17 0954    traZODone (DESYREL) tablet 50 mg, 50 mg, Oral, QHS PRN, Jovan Jaems, NP  Vincenzo Oseguera  [START ON 1/24/2017] vancomycin trough on 1/24 03:00 - draw prior to dose administration, , Other, ONCE, Jovan James NP    vancomycin (VANCOCIN) 1250 mg in  ml infusion, 1,250 mg, IntraVENous, Q18H, Jovan James, NP, Last Rate: 125 mL/hr at 01/23/17 0908, 1,250 mg at 01/23/17 0908    0.9% sodium chloride infusion, 10 mL/hr, IntraVENous, CONTINUOUS, Anna Dice, MD, Last Rate: 10 mL/hr at 01/23/17 0800, 10 mL/hr at 01/23/17 0800    vasopressin (VASOSTRICT) 20 Units in 0.9% sodium chloride 100 mL infusion, 0.01-0.04 Units/min, IntraVENous, TITRATE, Radha Delatorre MD, Stopped at 01/23/17 1232    levoFLOXacin (LEVAQUIN) 750 mg in D5W IVPB, 750 mg, IntraVENous, Q48H, Radha Delatorre MD, Last Rate: 100 mL/hr at 01/21/17 1532, 750 mg at 01/21/17 1532    sodium chloride (NS) flush 5-10 mL, 5-10 mL, InterCATHeter, Q8H, Radha Delatorre MD, 10 mL at 01/23/17 0517    sodium chloride (NS) flush 5-10 mL, 5-10 mL, InterCATHeter, PRN, Radha Delatorre MD    acetaminophen (TYLENOL) tablet 650 mg, 650 mg, Oral, Q6H PRN, Fredo Simons NP    aspirin delayed-release tablet 81 mg, 81 mg, Oral, DAILY, Fredo Simons NP, 81 mg at 01/23/17 0848    glucagon (GLUCAGEN) injection 1 mg, 1 mg, IntraMUSCular, PRN, Fredo Simons NP    glucose chewable tablet 16 g, 4 Tab, Oral, PRN, Fredo Simons NP    insulin glargine (LANTUS) injection 10 Units, 10 Units, SubCUTAneous, DAILY, Fredo Simons NP, 10 Units at 01/23/17 0908    insulin lispro (HUMALOG) injection, , SubCUTAneous, AC&HS, Fredo Simons NP, 2 Units at 01/23/17 1238    magnesium oxide (MAG-OX) tablet 400 mg, 400 mg, Oral, DAILY, Fredo Simons NP, 400 mg at 01/23/17 0848    nicotine (NICODERM CQ) 21 mg/24 hr patch 1 Patch, 1 Patch, TransDERmal, Q24H, Fredo Simons NP, 1 Patch at 01/23/17 0730    0.9% sodium chloride infusion, 9 mL/hr, IntraVENous, CONTINUOUS, Frdeo Simons NP, Last Rate: 9 mL/hr at 01/23/17 0800, 9 mL/hr at 01/23/17 0800    sodium chloride (NS) flush 5-10 mL, 5-10 mL, IntraVENous, Q8H, Kori Peacock, MONICA, 10 mL at 01/23/17 0517    sodium chloride (NS) flush 5-10 mL, 5-10 mL, IntraVENous, PRN, Fredo Simons, NP    albuterol (PROVENTIL VENTOLIN) nebulizer solution 2.5 mg, 2.5 mg, Nebulization, Q4H PRN, Fredo Simons, NP    heparin 25,000 units in D5W 250 ml infusion, 12-25 Units/kg/hr, IntraVENous, TITRATE, Linus Neff NP, Last Rate: 19.4 mL/hr at 01/23/17 1108, 24 Units/kg/hr at 01/23/17 1108    morphine injection 2 mg, 2 mg, IntraVENous, Q4H PRN, Linus Neff NP, 2 mg at 01/22/17 1628    diphenhydrAMINE (BENADRYL) capsule 25 mg, 25 mg, Oral, QHS PRN, Linus Neff NP, 25 mg at 01/22/17 2049    oxyCODONE-acetaminophen (PERCOCET) 5-325 mg per tablet 2 Tab, 2 Tab, Oral, Q4H PRN, Linus Neff NP, 2 Tab at 01/23/17 1143    milrinone (PRIMACOR) 20 MG/100 ML D5W infusion, 0.375 mcg/kg/min, IntraVENous, CONTINUOUS, Fatuma Peacock NP, Last Rate: 9.1 mL/hr at 01/23/17 0800, 0.375 mcg/kg/min at 01/23/17 0800    bumetanide (BUMEX) 12.5 mg in 0.9% sodium chloride 100 mL infusion, 0.5 mg/hr, IntraVENous, CONTINUOUS, Lore Arias MD, Last Rate: 4 mL/hr at 01/23/17 0800, 0.5 mg/hr at 01/23/17 0800    ELECTROLYTE REPLACEMENT PROTOCOL, 1 Each, Other, PRN, Linus Neff NP    vancomycin dosing per pharmacy, 1 Each, Other, Rx Dosing/Monitoring, Linus Neff NP    IMPRESSION:   1. Acute on chronic systolic heart failure (ICM) - Stage D, NYHA Class IV  2. Severe native coronary artery disease  3. Diabetes Mellitus  4. History of tobacco abuse  5. Cellulitis  6. CHACORTA on CKD3  7. Pulmonary HTN  8. A-fib this am       PLAN:   1. IV milrinone 0.3 mcg/kg/min and IV bumex 1 mg/hour, Follow I/O, Hold ACE-I due to CHACORTA on CKD. Low dose BB   2. ASA, statin, low dose BB  3. Myocardial viability assessment soon. 4. Lantus and sliding scale insulin, accuchecks, diabetic education  5. PFTs, carotid ultrasound, ABIs this week  6. IV vancomycin and levoflox  7. Smoking cessation counseling  8. ECHO today  9.  Amiodarone for A-fib                   Risk of morbidity and mortality - high  Medical decision making - high complexity       Signed By: Lore Arias MD

## 2017-01-23 NOTE — PROGRESS NOTES
[facility]   [address] [facility], [address], [facility]  Phone: [phone]   [phone]       Nephrology Progress Note  Yimi Pierre.     1950     [id number]  Date of Admission : 1/20/2017 01/23/17    CC: Follow up for CKD/ARF      Assessment and Plan   CHACORTA on CKD :  - CHACORTA likely 2/2 cardiorenal syndrome  - Cr essentially stable since starting milrinone  - cont current care for now  - unclear of baseline Cr but likely has some degree of CKD at baseline from DM2  - at risk for CHACORTA over the next 24-48 hours due to his cardiac cath 1/20    Hyponatremia :  - likely has chronic hyponatremia from CHF + alcoholism  - his PNA can be playing a role as well  - cortisol, TSH ok, urine osms 322 - c/w some excess ADH production  - not a great candidate for tolvaptan given his alcoholism and possible liver disease, but if needed, we can administer to the patient while here    Acute on Chronic Systolic CHF w/ severe CMP  - echo with EF 5-10%, severely dilated LV  - Multivessel CAD : being evaluated for CABG   - On Milrinone     Cellulitis RLE w/ Pustular lesions     Pulm HTN     Chronic smoker w/VENANCIO -PNA     Interval History:  Seen and examined. Feeling ok this AM.  Cr stable at 1.6. UOP > 4.6 liters, net neg 2.6 liters in the past 24 hours. Review of Systems: Pertinent items are noted in HPI.     Current Medications:   Current Facility-Administered Medications   Medication Dose Route Frequency    vancomycin (VANCOCIN) 1250 mg in  ml infusion  1,250 mg IntraVENous Q18H    0.9% sodium chloride infusion  10 mL/hr IntraVENous CONTINUOUS    vasopressin (VASOSTRICT) 20 Units in 0.9% sodium chloride 100 mL infusion  0.01-0.04 Units/min IntraVENous TITRATE    levoFLOXacin (LEVAQUIN) 750 mg in D5W IVPB  750 mg IntraVENous Q48H    sodium chloride (NS) flush 5-10 mL  5-10 mL InterCATHeter Q8H    sodium chloride (NS) flush 5-10 mL  5-10 mL InterCATHeter PRN    acetaminophen (TYLENOL) tablet 650 mg  650 mg Oral Q6H PRN    aspirin delayed-release tablet 81 mg  81 mg Oral DAILY    glucagon (GLUCAGEN) injection 1 mg  1 mg IntraMUSCular PRN    glucose chewable tablet 16 g  4 Tab Oral PRN    insulin glargine (LANTUS) injection 10 Units  10 Units SubCUTAneous DAILY    insulin lispro (HUMALOG) injection   SubCUTAneous AC&HS    magnesium oxide (MAG-OX) tablet 400 mg  400 mg Oral DAILY    nicotine (NICODERM CQ) 21 mg/24 hr patch 1 Patch  1 Patch TransDERmal Q24H    0.9% sodium chloride infusion  9 mL/hr IntraVENous CONTINUOUS    sodium chloride (NS) flush 5-10 mL  5-10 mL IntraVENous Q8H    sodium chloride (NS) flush 5-10 mL  5-10 mL IntraVENous PRN    albuterol (PROVENTIL VENTOLIN) nebulizer solution 2.5 mg  2.5 mg Nebulization Q4H PRN    heparin 25,000 units in D5W 250 ml infusion  12-25 Units/kg/hr IntraVENous TITRATE    morphine injection 2 mg  2 mg IntraVENous Q4H PRN    diphenhydrAMINE (BENADRYL) capsule 25 mg  25 mg Oral QHS PRN    oxyCODONE-acetaminophen (PERCOCET) 5-325 mg per tablet 2 Tab  2 Tab Oral Q4H PRN    milrinone (PRIMACOR) 20 MG/100 ML D5W infusion  0.375 mcg/kg/min IntraVENous CONTINUOUS    bumetanide (BUMEX) 12.5 mg in 0.9% sodium chloride 100 mL infusion  0.5 mg/hr IntraVENous CONTINUOUS    ELECTROLYTE REPLACEMENT PROTOCOL  1 Each Other PRN    vancomycin dosing per pharmacy  1 Each Other Rx Dosing/Monitoring      No Known Allergies    Objective:  Vitals:    Vitals:    01/23/17 0400 01/23/17 0500 01/23/17 0600 01/23/17 0700   BP:       Pulse: (!) 110 (!) 111 (!) 112 (!) 111   Resp: 19 22 21 20   Temp: 98.5 °F (36.9 °C)      SpO2: 97% 93% 96% 94%   Weight:    77 kg (169 lb 12.1 oz)     Intake and Output:     01/21 1901 - 01/23 0700  In: 3317 [P.O.:440; I.V.:2877]  Out: 7795 [Urine:7795]    Physical Examination:  General: Awake, alert  Neck:  JVD +  Resp:  diminished on L side   CV:  RRR,  no murmur or rub,+ LE edema  GI:  Soft, NT, + Bowel sounds, no hepatosplenomegaly  Neurologic:  Non focal  Skin:  RLE cellulitis w/ pustules   :  No mendoza    []    High complexity decision making was performed  []    Patient is at high-risk of decompensation with multiple organ involvement    Lab Data Personally Reviewed: I have reviewed all the pertinent labs, microbiology data and radiology studies during assessment. Recent Labs      01/23/17   0546  01/23/17   0111  01/22/17   1829  01/22/17   1111  01/22/17   0401   01/21/17   0352   NA  125*   --    --    --   127*   --   130*   K  3.9  4.0  3.7  4.3  3.7   < >  3.1*   CL  84*   --    --    --   86*   --   91*   CO2  35*   --    --    --   32   --   26   GLU  244*   --    --    --   248*   --   187*   BUN  27*   --    --    --   35*   --   45*   CREA  1.63*   --    --    --   1.52*   --   1.82*   CA  8.4*   --    --    --   8.2*   --   8.5   MG  2.1  1.8  1.8  2.0  1.8   < >  2.3   ALB  2.6*   --    --    --   2.7*   --   2.7*   SGOT  14*   --    --    --   10*   --   12*   ALT  9*   --    --    --   10*   --   9*    < > = values in this interval not displayed.      Recent Labs      01/23/17   0546  01/22/17   0401  01/21/17   0352   WBC  6.6  8.5  8.7   HGB  11.8*  11.9*  12.7   HCT  35.3*  36.2*  38.6   PLT  174  205  226     No results found for: SDES  Lab Results   Component Value Date/Time    Culture result: NO SIGNIFICANT GROWTH 01/19/2017 01:35 AM     Recent Results (from the past 24 hour(s))   POTASSIUM    Collection Time: 01/22/17 11:11 AM   Result Value Ref Range    Potassium 4.3 3.5 - 5.1 mmol/L   MAGNESIUM    Collection Time: 01/22/17 11:11 AM   Result Value Ref Range    Magnesium 2.0 1.6 - 2.4 mg/dL   SODIUM, UR, RANDOM    Collection Time: 01/22/17 11:11 AM   Result Value Ref Range    Sodium urine, random 44 MMOL/L   CREATININE, UR, RANDOM    Collection Time: 01/22/17 11:11 AM   Result Value Ref Range    Creatinine, urine 42.30 mg/dL   OSMOLALITY, UR    Collection Time: 01/22/17 11:11 AM   Result Value Ref Range    Osmolality,urine 322 MOSM/kg H2O   TSH 3RD GENERATION    Collection Time: 01/22/17 11:11 AM   Result Value Ref Range    TSH 2.04 0.36 - 3.74 uIU/mL   CORTISOL, AM    Collection Time: 01/22/17 11:11 AM   Result Value Ref Range    Cortisol, a.m. 29.3 (H) 4.3 - 22.4 ug/dL   POTASSIUM, UR, RANDOM    Collection Time: 01/22/17 11:12 AM   Result Value Ref Range    Potassium urine, random 44 MMOL/L   GLUCOSE, POC    Collection Time: 01/22/17 11:18 AM   Result Value Ref Range    Glucose (POC) 303 (H) 65 - 100 mg/dL    Performed by Sophy Catalan      PTT    Collection Time: 01/22/17 11:19 AM   Result Value Ref Range    aPTT 57.6 (H) 22.1 - 32.5 sec    aPTT, therapeutic range     58.0 - 77.0 SECS   GLUCOSE, POC    Collection Time: 01/22/17  5:09 PM   Result Value Ref Range    Glucose (POC) 298 (H) 65 - 100 mg/dL    Performed by Elias Brunson    POTASSIUM    Collection Time: 01/22/17  6:29 PM   Result Value Ref Range    Potassium 3.7 3.5 - 5.1 mmol/L   MAGNESIUM    Collection Time: 01/22/17  6:29 PM   Result Value Ref Range    Magnesium 1.8 1.6 - 2.4 mg/dL   PTT    Collection Time: 01/22/17  6:29 PM   Result Value Ref Range    aPTT 64.1 (H) 22.1 - 32.5 sec    aPTT, therapeutic range     58.0 - 77.0 SECS   GLUCOSE, POC    Collection Time: 01/22/17 10:45 PM   Result Value Ref Range    Glucose (POC) 234 (H) 65 - 100 mg/dL    Performed by [place]    Collection Time: 01/23/17  1:11 AM   Result Value Ref Range    Potassium 4.0 3.5 - 5.1 mmol/L   MAGNESIUM    Collection Time: 01/23/17  1:11 AM   Result Value Ref Range    Magnesium 1.8 1.6 - 2.4 mg/dL   PTT    Collection Time: 01/23/17  1:11 AM   Result Value Ref Range    aPTT 75.8 (H) 22.1 - 32.5 sec    aPTT, therapeutic range     58.0 - 77.0 SECS   POC VENOUS BLOOD GAS    Collection Time: 01/23/17  5:45 AM   Result Value Ref Range    Device: NASAL CANNULA      Flow rate (POC) 3 L/M    pH, venous (POC) 7.475 (H) 7.32 - 7.42      pCO2, venous (POC) 45.9 41 - 51 MMHG    pO2, venous (POC) 27 25 - 40 mmHg    HCO3, venous (POC) 33.8 (H) 23.0 - 28.0 MMOL/L    sO2, venous (POC) 54 (L) 65 - 88 %    Base excess, venous (POC) 10 mmol/L    Allens test (POC) N/A      Site SWAN TAVIA      Specimen type (POC) MIXED VENOUS     METABOLIC PANEL, COMPREHENSIVE    Collection Time: 01/23/17  5:46 AM   Result Value Ref Range    Sodium 125 (L) 136 - 145 mmol/L    Potassium 3.9 3.5 - 5.1 mmol/L    Chloride 84 (L) 97 - 108 mmol/L    CO2 35 (H) 21 - 32 mmol/L    Anion gap 6 5 - 15 mmol/L    Glucose 244 (H) 65 - 100 mg/dL    BUN 27 (H) 6 - 20 MG/DL    Creatinine 1.63 (H) 0.70 - 1.30 MG/DL    BUN/Creatinine ratio 17 12 - 20      GFR est AA 52 (L) >60 ml/min/1.73m2    GFR est non-AA 43 (L) >60 ml/min/1.73m2    Calcium 8.4 (L) 8.5 - 10.1 MG/DL    Bilirubin, total 0.8 0.2 - 1.0 MG/DL    ALT 9 (L) 12 - 78 U/L    AST 14 (L) 15 - 37 U/L    Alk. phosphatase 87 45 - 117 U/L    Protein, total 6.1 (L) 6.4 - 8.2 g/dL    Albumin 2.6 (L) 3.5 - 5.0 g/dL    Globulin 3.5 2.0 - 4.0 g/dL    A-G Ratio 0.7 (L) 1.1 - 2.2     MAGNESIUM    Collection Time: 01/23/17  5:46 AM   Result Value Ref Range    Magnesium 2.1 1.6 - 2.4 mg/dL   CBC W/O DIFF    Collection Time: 01/23/17  5:46 AM   Result Value Ref Range    WBC 6.6 4.1 - 11.1 K/uL    RBC 4.72 4.10 - 5.70 M/uL    HGB 11.8 (L) 12.1 - 17.0 g/dL    HCT 35.3 (L) 36.6 - 50.3 %    MCV 74.8 (L) 80.0 - 99.0 FL    MCH 25.0 (L) 26.0 - 34.0 PG    MCHC 33.4 30.0 - 36.5 g/dL    RDW 15.4 (H) 11.5 - 14.5 %    PLATELET 204 516 - 577 K/uL   GLUCOSE, POC    Collection Time: 01/23/17  7:09 AM   Result Value Ref Range    Glucose (POC) 309 (H) 65 - 100 mg/dL    Performed by Carmelo Escoto I have reviewed the flowsheets. Chart and Pertinent Notes have been reviewed. No change in PMH ,family and social history from Consult note.       Leonid Villasenor MD

## 2017-01-23 NOTE — PROGRESS NOTES
Attended IDR in CVICU where patient's care was discussed. [facility] Staff  (Greg Alcantara Patient Care Specialist)   Paging Service [phone]

## 2017-01-23 NOTE — DIABETES MGMT
DTC Progress/Education Note    Recommendations/ Comments: Chart reviewed for hyperglycemia. If appropriate, please consider increasing Lantus dose to 16 units (currently 10 units). Patient has required 10 units of correction in the last 24 hours and blood sugars are ranging 214-309. Also, if patient is eating at least 50% of food on trays, please consider adding Amaryl 2 mg. Patient is newly diagnosed and education was begun at [facility]. Spoke with patient and wife today and discussed education plan, reviewed nutrition. Will follow up for further education once patient is out of ICU. Patient is a 61 y.o. male with newly diagnosed Type 2 Diabetes. A1c:   Lab Results   Component Value Date/Time    Hemoglobin A1c 13.5 01/19/2017 05:05 PM    Hemoglobin A1c 13.4 01/18/2017 11:11 PM       Recent Glucose Results: Lab Results   Component Value Date/Time     (H) 01/23/2017 12:09 PM     (H) 01/23/2017 05:46 AM    GLUCPOC 238 (H) 01/23/2017 12:18 PM    GLUCPOC 309 (H) 01/23/2017 07:09 AM    GLUCPOC 234 (H) 01/22/2017 10:45 PM        Lab Results   Component Value Date/Time    Creatinine 1.60 01/23/2017 12:09 PM       Active Orders   Diet    DIET CARDIAC Regular; Consistent Carb 1800kcal        PO intake: Patient Vitals for the past 72 hrs:   % Diet Eaten   01/22/17 1500 100 %   01/21/17 1200 90 %       Current hospital DM medication: Lantus 10 units, Humalog for correction - high sensitivity    Will continue to follow as needed.     Thank you  Adalgisa Alcantara, MS, RN, CDE

## 2017-01-23 NOTE — H&P
[facility] Progress Note      NAME:  Fernanda Leal :   1950   MRN:   [id number]   PCP:  None  CARD:   Dr. Keisha Beard    Date:  2017     Fernanda Leal is a 61 y.o. male with a who presented for further evaluation of severe CAD and systolic heart failure. Subjective:   He was initially seen at [facility] 3 years ago and told that he had heart failure with LVEF 30%. He was lost to follow up due to lack of insurance and has not been seen by a physician in the interim. He complains of progressive fatigue, NAVARRO, PND, and edema over the past year, prompting presentation to [facility]. He also complains of lower extremity bullae, weeping, and pain. He denies palpitations, presyncope, syncope, or chest pain. Since admission, he has diuresed well on IV milrinone and bumex. He required low dose IV vasopressin over the weekend due to hypotension. Objective:     Visit Vitals    /67 (BP 1 Location: Left leg, BP Patient Position: At rest)    Pulse (!) 105    Temp 98 °F (36.7 °C)    Resp 19    Ht 5' 9.5\" (1.765 m)    Wt 77 kg (169 lb 12.1 oz)    SpO2 98%    BMI 24.71 kg/m2          General:  fatigued    HEENT: Normocephalic, EOMI, PERRLA, Hearing intact, trachea mid-line    Neck:  supple, no significant adenopathy, carotids upstroke normal bilaterally, no bruits    CVP:  12  cm  ( ++ ) HJR    Heart:  Diminished PMI    Normal S1 and S2, S3 gallop    Murmur: 2/6 systolic murmur    Lungs: rales bilaterally    Abdomen: soft, non-tender. Bowel sounds normal. +HSM    Extremity: Warm, red on the right lower extremity with 4+ pitting edema bilaterally    Neuro: Alert and oriented to person, place, and time; normal strength and tone. Normal symmetric reflexes.  Normal coordination and gait       1901 -  0700  In: 4996 [P.O.:440; I.V.:2877]  Out: 7795 [Urine:7795]  O2 Flow Rate (L/min): 2 l/min O2 Device: Nasal cannula  Temp (24hrs), Av °F (36.7 °C), Min:97.5 °F (36.4 °C), Max:98.5 °F (36.9 °C)    Hemodynamics:  CO 4.3 Lpm  CI 2.2 L/min/m2  PAP 63/36 mmHg  SvO2 49%        Care Plan discussed with:    Comments   Patient x    Family  x    RN x    Care Manager                    Consultant:          Past History:     Past Medical History   Diagnosis Date    Cardiomyopathy ([address] 1/20/2017     A. Echo (1/19/17):  EF 5-10% with severe GHK,. Mildly dil LA. Mild TR. PASP 46. No past surgical history on file. Social History   Substance Use Topics    Smoking status: Not on file    Smokeless tobacco: Not on file    Alcohol use Not on file        No family history on file. Allergies:   No Known Allergies       Data Review:     CXR:   CXR Results  (Last 48 hours)               01/23/17 0450  XR CHEST PORT Final result    Impression:  IMPRESSION:    Persistent left lower opacification. Narrative:  INDICATION:  PAC. COMPARISON:  1/22/2017. FINDINGS:     An AP portable view was obtained of the chest.     The right [place]-Val catheter tip right main pulmonary artery is seen in place. .   The heart is enlarged. Persistent left lower opacification compatible with airspace disease and pleural   effusion is seen. 01/22/17 0439  XR CHEST PORT Final result    Impression:  IMPRESSION:    No significant change. Narrative:  INDICATION:    PAC       EXAMINATION:  AP CHEST, PORTABLE       COMPARISON: 1/21/2017       FINDINGS: Single AP portable view of the chest at 0400 hours demonstrates   unchanged right IJ Braz[place]-Val catheter with tip over the proximal right pulmonary   artery. The cardiomediastinal silhouette is stable. Left basilar airspace   disease/effusion is unchanged. No new airspace disease or pneumothorax.                    Echocardiogram:   Echo Results  (Last 48 hours)               01/23/17 1214  2D ECHO COMPLETE ADULT (TTE) W OR WO CONTR Final result    Narrative:  [address] [place], [place] [address]   [phone]       Transthoracic Echocardiogram       Patient: Erlin Lua   MRN: [id number]   ACCT #: [de-identified]   : 1950   Age: 61 years   Gender: Male   Height: 69 in   Weight: 168.7 lb   BSA: 1.92 m squared   BP: 111 / 60 mmHg   Study date: 2017   Status: Routine   Location: [facility] ACC #: [id number]       Allergies: NO KNOWN ALLERGIES       Ordering Physician:  Lauren Alexander. Roberta Sanchez MD   Reading Group:  *CAV Group   Technologist:  Kaila East RDCS   Reading Physician:  Kate Sanchez. STEVE Young Reynaldo:   Left ventricle: Systolic function was severely reduced by EF (biplane   method of disks). Ejection fraction was estimated to be 7 %. There was   severe diffuse hypokinesis. Ventricular septum: There was systolic and diastolic flattening. Right ventricle: The ventricle was moderately to severely dilated. Systolic function was mildly to moderately reduced. Left atrium: The atrium was moderately dilated. Right atrium: The atrium was moderately dilated. Mitral valve: There was mild thickening. 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 There was mild regurgitation. Aortic valve: The valve was trileaflet. Leaflets exhibited sclerosis   without stenosis. Pericardium: There was a large left pleural effusion. INDICATIONS: Assess left ventricular function. PROCEDURE: This was a routine study. The study included complete 2D   imaging, M-mode, complete spectral Doppler, and color Doppler. The heart   rate was 94 bpm, at the start of the study. Systolic blood pressure was   111 mmHg, at the start of the study. Diastolic blood pressure was 60 mmHg,   at the start of the study. Image quality was adequate. LEFT VENTRICLE: Size was normal. Systolic function was severely reduced by   EF (biplane method of disks). Ejection fraction was estimated to be 7 %. There was severe diffuse hypokinesis.  Burgess Carbajal thickness was normal.       VENTRICULAR SEPTUM: There was systolic and diastolic flattening. RIGHT VENTRICLE: The ventricle was moderately to severely dilated. Systolic function was mildly to moderately reduced. A pacing wire was   present. LEFT ATRIUM: The atrium was moderately dilated. ATRIAL SEPTUM: The atrial septum appeared intact. RIGHT ATRIUM: The atrium was moderately dilated. MITRAL VALVE: There was mild thickening. 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 DOPPLER: There was mild   regurgitation. AORTIC VALVE: The valve was trileaflet. Leaflets exhibited sclerosis   without stenosis. DOPPLER: Transaortic velocity was within the normal   range. There was no stenosis. There was no regurgitation. TRICUSPID VALVE: Normal valve structure. DOPPLER: There was trivial   regurgitation. PULMONIC VALVE: Not well visualized, but normal Doppler findings. AORTA: The root exhibited normal size. PERICARDIUM: There was no pericardial effusion. There was a large left   pleural effusion.        SYSTEM MEASUREMENT TABLES       2D   Ao Diam: 3.86 cm   LA Diam: 4.78 cm   LAAs A2C: 29.04 cm2   LAAs A4C: 25.47 cm2   LAESV A-L A2C: 112.72 ml   LAESV A-L A4C: 83.65 ml   LAESV Index (A-L): 51.48 ml/m2   LAESV MOD A2C: 111.33 ml   LAESV MOD A4C: 77.33 ml   LAESV(A-L): 98.85 ml   LALs A2C: 6.35 cm   LALs A4C: 6.58 cm   LVOT Diam: 2.34 cm   %FS: 4.07 %   EDV(Teich): 185.56 ml   EF Biplane: 6.61 %   EF(Teich): 9.07 %   ESV(Teich): 168.73 ml   IVSd: 1.11 cm   LVEDV MOD A2C: 301.28 ml   LVEDV MOD A4C: 199.89 ml   LVEDV MOD BP: 250.58 ml   LVEF MOD A2C: 7.36 %   LVEF MOD A4C: 5.3 %   LVESV MOD A2C: 279.1 ml   LVESV MOD A4C: 189.3 ml   LVESV MOD BP: 234.01 ml   LVIDd: 6.08 cm   LVIDs: 5.83 cm   LVLd A2C: 10.31 cm   LVLd A4C: 9.87 cm   LVLs A2C: 9.59 cm   LVLs A4C: 9.2 cm   LVPWd: 1.14 cm   SV MOD A2C: 22.18 ml   SV MOD A4C: 10.58 ml   SV(Teich): 16.83 ml RVIDd: 4.26 cm       CW   AV Env. Ti: 205.52 ms   AV VTI: 14.74 cm   AV Vmax: 0.89 m/s   AV Vmean: 0.72 m/s   AV maxPG: 3.14 mmHg   AV meanP.22 mmHg   PV Vmax: 0.71 m/s   PV maxP mmHg   TR Vmax: 3.16 m/s   TR maxP.88 mmHg       PW   MIRELLA (VTI): 2.68 cm2   MIRELLA Vmax: 3.27 cm2   AVAI (VTI): 0 cm2/m2   AVAI Vmax: 0 cm2/m2   LVOT Env. Ti: 194.1 ms   LVOT VTI: 9.18 cm   LVOT Vmax: 0.67 m/s   LVOT Vmean: 0.47 m/s   LVOT maxP.81 mmHg   LVOT meanP.05 mmHg   E' Lat: 0.06 m/s   E' Sept: 0.05 m/s   E/E' Lat: 17.82   E/E' Sept: 23.43   MV A Glenn: 0.39 m/s   MV Dec [place]: 12.17 m/s2   MV DecT: 91.66 ms   MV E Glenn: 1.12 m/s   MV E/A Ratio: 2.83   MV PHT: 26.58 ms   MVA By PHT: 8.28 cm2   LVSI Dopp: 20.55 ml/m2   LVSV Dopp: 39.46 ml       Prepared and E-signed by       Miesha Bran M.D. Signed 2017 13:56:52                 No results found for this visit on 17.       ECG:  EKG: ST with occasional PVC, NSIVCD, LAE    LABS:  Recent Results (from the past 24 hour(s))   POTASSIUM    Collection Time: 17  6:29 PM   Result Value Ref Range    Potassium 3.7 3.5 - 5.1 mmol/L   MAGNESIUM    Collection Time: 17  6:29 PM   Result Value Ref Range    Magnesium 1.8 1.6 - 2.4 mg/dL   PTT    Collection Time: 17  6:29 PM   Result Value Ref Range    aPTT 64.1 (H) 22.1 - 32.5 sec    aPTT, therapeutic range     58.0 - 77.0 SECS   GLUCOSE, POC    Collection Time: 17 10:45 PM   Result Value Ref Range    Glucose (POC) 234 (H) 65 - 100 mg/dL    Performed by [place]    Collection Time: 17  1:11 AM   Result Value Ref Range    Potassium 4.0 3.5 - 5.1 mmol/L   MAGNESIUM    Collection Time: 17  1:11 AM   Result Value Ref Range    Magnesium 1.8 1.6 - 2.4 mg/dL   PTT    Collection Time: 17  1:11 AM   Result Value Ref Range    aPTT 75.8 (H) 22.1 - 32.5 sec    aPTT, therapeutic range     58.0 - 77.0 SECS   POC VENOUS BLOOD GAS    Collection Time: 17  5:45 AM Result Value Ref Range    Device: NASAL CANNULA      Flow rate (POC) 3 L/M    pH, venous (POC) 7.475 (H) 7.32 - 7.42      pCO2, venous (POC) 45.9 41 - 51 MMHG    pO2, venous (POC) 27 25 - 40 mmHg    HCO3, venous (POC) 33.8 (H) 23.0 - 28.0 MMOL/L    sO2, venous (POC) 54 (L) 65 - 88 %    Base excess, venous (POC) 10 mmol/L    Allens test (POC) N/A      Site SWAN TAVIA      Specimen type (POC) MIXED VENOUS     METABOLIC PANEL, COMPREHENSIVE    Collection Time: 01/23/17  5:46 AM   Result Value Ref Range    Sodium 125 (L) 136 - 145 mmol/L    Potassium 3.9 3.5 - 5.1 mmol/L    Chloride 84 (L) 97 - 108 mmol/L    CO2 35 (H) 21 - 32 mmol/L    Anion gap 6 5 - 15 mmol/L    Glucose 244 (H) 65 - 100 mg/dL    BUN 27 (H) 6 - 20 MG/DL    Creatinine 1.63 (H) 0.70 - 1.30 MG/DL    BUN/Creatinine ratio 17 12 - 20      GFR est AA 52 (L) >60 ml/min/1.73m2    GFR est non-AA 43 (L) >60 ml/min/1.73m2    Calcium 8.4 (L) 8.5 - 10.1 MG/DL    Bilirubin, total 0.8 0.2 - 1.0 MG/DL    ALT 9 (L) 12 - 78 U/L    AST 14 (L) 15 - 37 U/L    Alk.  phosphatase 87 45 - 117 U/L    Protein, total 6.1 (L) 6.4 - 8.2 g/dL    Albumin 2.6 (L) 3.5 - 5.0 g/dL    Globulin 3.5 2.0 - 4.0 g/dL    A-G Ratio 0.7 (L) 1.1 - 2.2     MAGNESIUM    Collection Time: 01/23/17  5:46 AM   Result Value Ref Range    Magnesium 2.1 1.6 - 2.4 mg/dL   CBC W/O DIFF    Collection Time: 01/23/17  5:46 AM   Result Value Ref Range    WBC 6.6 4.1 - 11.1 K/uL    RBC 4.72 4.10 - 5.70 M/uL    HGB 11.8 (L) 12.1 - 17.0 g/dL    HCT 35.3 (L) 36.6 - 50.3 %    MCV 74.8 (L) 80.0 - 99.0 FL    MCH 25.0 (L) 26.0 - 34.0 PG    MCHC 33.4 30.0 - 36.5 g/dL    RDW 15.4 (H) 11.5 - 14.5 %    PLATELET 744 681 - 012 K/uL   PRO-BNP    Collection Time: 01/23/17  5:46 AM   Result Value Ref Range    NT pro-BNP 7086 (H) 0 - 125 PG/ML   GLUCOSE, POC    Collection Time: 01/23/17  7:09 AM   Result Value Ref Range    Glucose (POC) 309 (H) 65 - 100 mg/dL    Performed by Shayla Tilley    EKG, 12 LEAD, INITIAL    Collection Time: 01/23/17 10:13 AM   Result Value Ref Range    Ventricular Rate 99 BPM    Atrial Rate 99 BPM    P-R Interval 172 ms    QRS Duration 112 ms    Q-T Interval 358 ms    QTC Calculation (Bezet) 459 ms    Calculated P Axis 53 degrees    Calculated R Axis 73 degrees    Calculated T Axis -63 degrees    Diagnosis       Normal sinus rhythm  Possible Left atrial enlargement  T wave abnormality, consider inferolateral ischemia  When compared with ECG of 18-JAN-2017 23:07,  premature ventricular complexes are no longer present  QRS duration has decreased  Inverted T waves have replaced nonspecific T wave abnormality in Inferior   leads  Confirmed by Dominick Emanuel MD ([id number]) on 1/23/2017 5:55:46 PM     MAGNESIUM    Collection Time: 01/23/17 12:09 PM   Result Value Ref Range    Magnesium 2.1 1.6 - 2.4 mg/dL   PTT    Collection Time: 01/23/17 12:09 PM   Result Value Ref Range    aPTT 60.0 (H) 22.1 - 32.5 sec    aPTT, therapeutic range     58.0 - 77.0 SECS   RENAL FUNCTION PANEL    Collection Time: 01/23/17 12:09 PM   Result Value Ref Range    Sodium 124 (L) 136 - 145 mmol/L    Potassium 4.1 3.5 - 5.1 mmol/L    Chloride 84 (L) 97 - 108 mmol/L    CO2 33 (H) 21 - 32 mmol/L    Anion gap 7 5 - 15 mmol/L    Glucose 214 (H) 65 - 100 mg/dL    BUN 28 (H) 6 - 20 MG/DL    Creatinine 1.60 (H) 0.70 - 1.30 MG/DL    BUN/Creatinine ratio 18 12 - 20      GFR est AA 53 (L) >60 ml/min/1.73m2    GFR est non-AA 44 (L) >60 ml/min/1.73m2    Calcium 8.5 8.5 - 10.1 MG/DL    Phosphorus 2.5 (L) 2.6 - 4.7 MG/DL    Albumin 2.8 (L) 3.5 - 5.0 g/dL   PROTHROMBIN TIME + INR    Collection Time: 01/23/17 12:09 PM   Result Value Ref Range    INR 1.3 (H) 0.9 - 1.1      Prothrombin time 13.5 (H) 9.0 - 11.1 sec   GLUCOSE, POC    Collection Time: 01/23/17 12:18 PM   Result Value Ref Range    Glucose (POC) 238 (H) 65 - 100 mg/dL    Performed by Yanet HAYWOOD, POC    Collection Time: 01/23/17  5:20 PM   Result Value Ref Range    Glucose (POC) 222 (H) 65 - 100 mg/dL    Performed by Joanne Cha          Medications reviewed:    Current Facility-Administered Medications   Medication Dose Route Frequency    amiodarone (CORDARONE) 450 mg in dextrose 5% 250 mL infusion  0.5-1 mg/min IntraVENous TITRATE    traZODone (DESYREL) tablet 50 mg  50 mg Oral QHS PRN    [START ON 1/24/2017] vancomycin trough on 1/24 03:00 - draw prior to dose administration   Other ONCE    senna-docusate (PERICOLACE) 8.6-50 mg per tablet 1 Tab  1 Tab Oral DAILY    vancomycin (VANCOCIN) 1250 mg in  ml infusion  1,250 mg IntraVENous Q18H    0.9% sodium chloride infusion  10 mL/hr IntraVENous CONTINUOUS    vasopressin (VASOSTRICT) 20 Units in 0.9% sodium chloride 100 mL infusion  0.01-0.04 Units/min IntraVENous TITRATE    levoFLOXacin (LEVAQUIN) 750 mg in D5W IVPB  750 mg IntraVENous Q48H    sodium chloride (NS) flush 5-10 mL  5-10 mL InterCATHeter Q8H    sodium chloride (NS) flush 5-10 mL  5-10 mL InterCATHeter PRN    acetaminophen (TYLENOL) tablet 650 mg  650 mg Oral Q6H PRN    aspirin delayed-release tablet 81 mg  81 mg Oral DAILY    glucagon (GLUCAGEN) injection 1 mg  1 mg IntraMUSCular PRN    glucose chewable tablet 16 g  4 Tab Oral PRN    insulin glargine (LANTUS) injection 10 Units  10 Units SubCUTAneous DAILY    insulin lispro (HUMALOG) injection   SubCUTAneous AC&HS    magnesium oxide (MAG-OX) tablet 400 mg  400 mg Oral DAILY    nicotine (NICODERM CQ) 21 mg/24 hr patch 1 Patch  1 Patch TransDERmal Q24H    0.9% sodium chloride infusion  9 mL/hr IntraVENous CONTINUOUS    sodium chloride (NS) flush 5-10 mL  5-10 mL IntraVENous Q8H    sodium chloride (NS) flush 5-10 mL  5-10 mL IntraVENous PRN    albuterol (PROVENTIL VENTOLIN) nebulizer solution 2.5 mg  2.5 mg Nebulization Q4H PRN    heparin 25,000 units in D5W 250 ml infusion  12-25 Units/kg/hr IntraVENous TITRATE    morphine injection 2 mg  2 mg IntraVENous Q4H PRN    diphenhydrAMINE (BENADRYL) capsule 25 mg  25 mg Oral QHS PRN    oxyCODONE-acetaminophen (PERCOCET) 5-325 mg per tablet 2 Tab  2 Tab Oral Q4H PRN    milrinone (PRIMACOR) 20 MG/100 ML D5W infusion  0.375 mcg/kg/min IntraVENous CONTINUOUS    bumetanide (BUMEX) 12.5 mg in 0.9% sodium chloride 100 mL infusion  0.5 mg/hr IntraVENous CONTINUOUS    ELECTROLYTE REPLACEMENT PROTOCOL  1 Each Other PRN    vancomycin dosing per pharmacy  1 Each Other Rx Dosing/Monitoring           IMPRESSION:   1. Acute on chronic systolic heart failure (ICM) - Stage D, NYHA Class IV  2. Severe native coronary artery disease  3. Diabetes Mellitus  4. History of tobacco abuse  5. Cellulitis  6. CHACORTA on CKD3  7. Pulmonary HTN       PLAN:   1. Continue IV milrinone 0.3 mcg/kg/min and IV bumex 0.5 mg/hour, Follow I/O, Replete electrolytes, Hold ACE-I due to CHACORTA on CKD. Low dose BB  2. Continue ASA, statin, low dose BB; too high risk for CABG d/t low LVEF and diabetes out of control  3. Start lantus and sliding scale insulin, accuchecks, diabetic education  4. Schedule PFTs, carotid ultrasound, ABIs to further assess risk for surgical revascularization  5. Change IV vancomycin and levaquin to IV ancef  6. Smoking cessation counseling           Critical care was necessary to treat or prevent imminent or life threatening deterioration of the following conditions: cardiac failure, respiratory failure, and CNS failure or compromise. Total critical care time spent:  32 minutes. There was no overlap with other services    Services Provided:   1. Telemetry review and 12 lead ECG interpretation   2. Hemodynamic interpretation, assessment and management   3. Review and interpretation of CXR   4. Review and interpretation of lab values   5. Review and interpretation of microbiologic data and culture results   6. Review of medications and administration   7. Review and interpretation of nutrition requirements and management   8. Discussion of management with other consultants and services   9. Clinical update to family members      Thank you for letting me see him with you,    Dorie Valdez MD, [facility] - [place] [address] Director  Jennifer Roman [address] [place] [address]  Office: [phone]  Fax: [phone]  24 hour VAD/HF Pager: [phone]

## 2017-01-23 NOTE — PROGRESS NOTES
Spiritual Care Assessment/Progress Notes    Janay Curtis [id number]  [id number]    1950  61 y.o.  male    Patient Telephone Number: [phone] (home)   Hoahaoism Affiliation: Luan Free   Language: English   Extended Emergency Contact Information  Primary Emergency Contact: [address]  Mobile Phone: [phone]  Relation: None   Patient Active Problem List    Diagnosis Date Noted    Ischemic cardiomyopathy 50/51/5922    Systolic heart failure ([facility] 32/75/8389    Acute systolic (congestive) heart failure ([address] 01/19/2017    Bilateral lower extremity edema 01/19/2017    Shortness of breath 01/19/2017    Acute renal failure (ARF) ([facility] 01/19/2017    Hyperglycemia due to type 2 diabetes mellitus ([facility] 01/19/2017    Venous stasis dermatitis of both lower extremities 01/19/2017    Hypoxia 01/19/2017    Pulmonary edema 01/19/2017    Sinus tachycardia 01/19/2017        Date: 1/23/2017       Level of Hoahaoism/Spiritual Activity:  []         Involved in juan tradition/spiritual practice    []         Not involved in juan tradition/spiritual practice  [x]         Spiritually oriented    []         Claims no spiritual orientation    []         seeking spiritual identity  []         Feels alienated from Methodist practice/tradition  []         Feels angry about Methodist practice/tradition  [x]         Spirituality is a resource for coping at this time.   []         Not able to assess due to medical condition    Services Provided Today:  []         crisis intervention    []         reading Scriptures  [x]         spiritual assessment    []         prayer  [x]         empathic listening/emotional support  []         rites and rituals (cite in comments)  []         life review     []         Methodist support  []         theological development   []         advocacy  []         ethical dialog     []         blessing  []         bereavement support    []         support to family  []         anticipatory grief support   []         help with AMD  []         spiritual guidance    []         meditation      Spiritual Care Needs  []         Emotional Support  []         Spiritual/Orthodoxy Care  []         Loss/Adjustment  []         Advocacy/Referral                /Ethics  [x]         No needs expressed at               this time  []         Other: (note in               comments)  [address]  []         Follow up visits with               pt/family  []         Provide materials  []         Schedule sacraments  []         Contact Community               Clergy  [x]         Follow up as needed  []         Other: (note in               comments)     Initial spiritual assessment in [place] [address].  visit was welcomed by Mr. Parviz Hernadez (goes by \"Sp[employer]\"). He was alone in his room. Found him to be gracious, talkative, and transparent in dialogue. Spoke of an unfortunate first marriage of 21 years and a second marriage of 21 years that continues to be source of companionship and mutual support. He is unable to work, however is wife has been employed for many years with a local [address] He also has 3 children from his first marriage---2 sons survive. A daughter was killed in a car accident at 33 yo. He was raised in the [employer] tradition and witnessed a split in the Hindu at 17 yo that has permanently colored his opinion of organized Worship. He continues to be a person of deep juan---however he expresses that juan on a personal level---staying conscious of his relationship with God and praying or talking to God several times during the day and always before the conclusion of the day. Indicated some pride in his [employer] and [employer]. No immediate spiritual needs, though expressed gratitude for a promise of prayer. Will revisit as able or as needed. Reminded patient of  availability as needed. [facility] [facility]  ([place] [address] Patient Care Specialist)   Paging Service [phone])

## 2017-01-23 NOTE — PROGRESS NOTES
Chart reviewed. Case discussed with nursing staff. Pt is limited by [place]/ unable to ambulate on the unit. Nursing reporting that patient is mostly independent with transfers to and from chair, just needing assistance with lines. In to talk with patient who is concerned about swelling and \"soreness\" in his legs, most likely secondary to cellulitis. Pt just returned to bed. Will follow up later in the week to assess further. Pt is being worked up for possible CABG. Thanks!     Annie Fong PT, DPT

## 2017-01-24 ENCOUNTER — APPOINTMENT (OUTPATIENT)
Dept: GENERAL RADIOLOGY | Age: 67
DRG: 215 | End: 2017-01-24
Attending: NURSE PRACTITIONER
Payer: SELF-PAY

## 2017-01-24 LAB
ALBUMIN SERPL BCP-MCNC: 2.6 G/DL (ref 3.5–5)
ALBUMIN/GLOB SERPL: 0.7 {RATIO} (ref 1.1–2.2)
ALP SERPL-CCNC: 81 U/L (ref 45–117)
ALT SERPL-CCNC: 12 U/L (ref 12–78)
ANION GAP BLD CALC-SCNC: 7 MMOL/L (ref 5–15)
APTT PPP: 51.7 SEC (ref 22.1–32.5)
APTT PPP: 59 SEC (ref 22.1–32.5)
APTT PPP: 88.9 SEC (ref 22.1–32.5)
ARTERIAL PATENCY WRIST A: ABNORMAL
AST SERPL W P-5'-P-CCNC: 14 U/L (ref 15–37)
BASE EXCESS BLDV CALC-SCNC: 3 MMOL/L
BDY SITE: ABNORMAL
BILIRUB SERPL-MCNC: 0.9 MG/DL (ref 0.2–1)
BNP SERPL-MCNC: 6446 PG/ML (ref 0–125)
BUN SERPL-MCNC: 28 MG/DL (ref 6–20)
BUN/CREAT SERPL: 17 (ref 12–20)
CALCIUM SERPL-MCNC: 8.7 MG/DL (ref 8.5–10.1)
CHLORIDE SERPL-SCNC: 84 MMOL/L (ref 97–108)
CO2 SERPL-SCNC: 32 MMOL/L (ref 21–32)
CREAT SERPL-MCNC: 1.69 MG/DL (ref 0.7–1.3)
DATE LAST DOSE: ABNORMAL
ERYTHROCYTE [DISTWIDTH] IN BLOOD BY AUTOMATED COUNT: 15.6 % (ref 11.5–14.5)
GAS FLOW.O2 O2 DELIVERY SYS: ABNORMAL L/MIN
GAS FLOW.O2 SETTING OXYMISER: 2 L/M
GLOBULIN SER CALC-MCNC: 3.7 G/DL (ref 2–4)
GLUCOSE BLD STRIP.AUTO-MCNC: 182 MG/DL (ref 65–100)
GLUCOSE BLD STRIP.AUTO-MCNC: 194 MG/DL (ref 65–100)
GLUCOSE BLD STRIP.AUTO-MCNC: 196 MG/DL (ref 65–100)
GLUCOSE BLD STRIP.AUTO-MCNC: 201 MG/DL (ref 65–100)
GLUCOSE SERPL-MCNC: 176 MG/DL (ref 65–100)
HCO3 BLDV-SCNC: 26.7 MMOL/L (ref 23–28)
HCT VFR BLD AUTO: 35.8 % (ref 36.6–50.3)
HGB BLD-MCNC: 11.5 G/DL (ref 12.1–17)
MAGNESIUM SERPL-MCNC: 1.9 MG/DL (ref 1.6–2.4)
MAGNESIUM SERPL-MCNC: 2 MG/DL (ref 1.6–2.4)
MCH RBC QN AUTO: 24.2 PG (ref 26–34)
MCHC RBC AUTO-ENTMCNC: 32.1 G/DL (ref 30–36.5)
MCV RBC AUTO: 75.2 FL (ref 80–99)
O2/TOTAL GAS SETTING VFR VENT: 28 %
PCO2 BLDV: 38.5 MMHG (ref 41–51)
PH BLDV: 7.45 [PH] (ref 7.32–7.42)
PLATELET # BLD AUTO: 156 K/UL (ref 150–400)
PO2 BLDV: 31 MMHG (ref 25–40)
POTASSIUM SERPL-SCNC: 4.2 MMOL/L (ref 3.5–5.1)
POTASSIUM SERPL-SCNC: 4.4 MMOL/L (ref 3.5–5.1)
PROT SERPL-MCNC: 6.3 G/DL (ref 6.4–8.2)
RBC # BLD AUTO: 4.76 M/UL (ref 4.1–5.7)
REPORTED DOSE,DOSE: ABNORMAL UNITS
REPORTED DOSE/TIME,TMG: ABNORMAL
SAO2 % BLDV: 62 % (ref 65–88)
SERVICE CMNT-IMP: ABNORMAL
SODIUM SERPL-SCNC: 123 MMOL/L (ref 136–145)
SODIUM SERPL-SCNC: 123 MMOL/L (ref 136–145)
SODIUM SERPL-SCNC: 125 MMOL/L (ref 136–145)
SPECIMEN TYPE: ABNORMAL
THERAPEUTIC RANGE,PTTT: ABNORMAL SECS (ref 58–77)
TOTAL RESP. RATE, ITRR: 14
VANCOMYCIN TROUGH SERPL-MCNC: 20.2 UG/ML (ref 5–10)
WBC # BLD AUTO: 6.4 K/UL (ref 4.1–11.1)

## 2017-01-24 PROCEDURE — 74011000258 HC RX REV CODE- 258: Performed by: THORACIC SURGERY (CARDIOTHORACIC VASCULAR SURGERY)

## 2017-01-24 PROCEDURE — 74011250636 HC RX REV CODE- 250/636: Performed by: INTERNAL MEDICINE

## 2017-01-24 PROCEDURE — 83880 ASSAY OF NATRIURETIC PEPTIDE: CPT | Performed by: NURSE PRACTITIONER

## 2017-01-24 PROCEDURE — 77030011256 HC DRSG MEPILEX <16IN NO BORD MOLN -A

## 2017-01-24 PROCEDURE — 74011000250 HC RX REV CODE- 250: Performed by: THORACIC SURGERY (CARDIOTHORACIC VASCULAR SURGERY)

## 2017-01-24 PROCEDURE — 74011250637 HC RX REV CODE- 250/637: Performed by: THORACIC SURGERY (CARDIOTHORACIC VASCULAR SURGERY)

## 2017-01-24 PROCEDURE — 97535 SELF CARE MNGMENT TRAINING: CPT

## 2017-01-24 PROCEDURE — 65610000003 HC RM ICU SURGICAL

## 2017-01-24 PROCEDURE — 74011250637 HC RX REV CODE- 250/637: Performed by: NURSE PRACTITIONER

## 2017-01-24 PROCEDURE — 82962 GLUCOSE BLOOD TEST: CPT

## 2017-01-24 PROCEDURE — 36415 COLL VENOUS BLD VENIPUNCTURE: CPT | Performed by: NURSE PRACTITIONER

## 2017-01-24 PROCEDURE — 83735 ASSAY OF MAGNESIUM: CPT | Performed by: NURSE PRACTITIONER

## 2017-01-24 PROCEDURE — 97110 THERAPEUTIC EXERCISES: CPT

## 2017-01-24 PROCEDURE — 82803 BLOOD GASES ANY COMBINATION: CPT

## 2017-01-24 PROCEDURE — 74011250636 HC RX REV CODE- 250/636

## 2017-01-24 PROCEDURE — 80053 COMPREHEN METABOLIC PANEL: CPT | Performed by: NURSE PRACTITIONER

## 2017-01-24 PROCEDURE — 85027 COMPLETE CBC AUTOMATED: CPT | Performed by: NURSE PRACTITIONER

## 2017-01-24 PROCEDURE — 74011000258 HC RX REV CODE- 258: Performed by: NURSE PRACTITIONER

## 2017-01-24 PROCEDURE — 85730 THROMBOPLASTIN TIME PARTIAL: CPT | Performed by: THORACIC SURGERY (CARDIOTHORACIC VASCULAR SURGERY)

## 2017-01-24 PROCEDURE — 74011000258 HC RX REV CODE- 258: Performed by: INTERNAL MEDICINE

## 2017-01-24 PROCEDURE — 77010033678 HC OXYGEN DAILY

## 2017-01-24 PROCEDURE — 84295 ASSAY OF SERUM SODIUM: CPT | Performed by: INTERNAL MEDICINE

## 2017-01-24 PROCEDURE — 71010 XR CHEST PORT: CPT

## 2017-01-24 PROCEDURE — 74011250636 HC RX REV CODE- 250/636: Performed by: NURSE PRACTITIONER

## 2017-01-24 PROCEDURE — 80202 ASSAY OF VANCOMYCIN: CPT | Performed by: NURSE PRACTITIONER

## 2017-01-24 PROCEDURE — 74011250636 HC RX REV CODE- 250/636: Performed by: THORACIC SURGERY (CARDIOTHORACIC VASCULAR SURGERY)

## 2017-01-24 PROCEDURE — 74011636637 HC RX REV CODE- 636/637: Performed by: NURSE PRACTITIONER

## 2017-01-24 PROCEDURE — 84132 ASSAY OF SERUM POTASSIUM: CPT | Performed by: NURSE PRACTITIONER

## 2017-01-24 RX ORDER — ONDANSETRON 2 MG/ML
4 INJECTION INTRAMUSCULAR; INTRAVENOUS
Status: DISCONTINUED | OUTPATIENT
Start: 2017-01-24 | End: 2017-01-31

## 2017-01-24 RX ORDER — GLIMEPIRIDE 2 MG/1
2 TABLET ORAL
Status: DISCONTINUED | OUTPATIENT
Start: 2017-01-24 | End: 2017-02-01

## 2017-01-24 RX ORDER — ONDANSETRON 2 MG/ML
INJECTION INTRAMUSCULAR; INTRAVENOUS
Status: COMPLETED
Start: 2017-01-24 | End: 2017-01-24

## 2017-01-24 RX ORDER — TOLVAPTAN 15 MG/1
15 TABLET ORAL ONCE
Status: COMPLETED | OUTPATIENT
Start: 2017-01-24 | End: 2017-01-24

## 2017-01-24 RX ORDER — PANTOPRAZOLE SODIUM 40 MG/1
40 TABLET, DELAYED RELEASE ORAL
Status: DISCONTINUED | OUTPATIENT
Start: 2017-01-24 | End: 2017-01-31

## 2017-01-24 RX ORDER — MAGNESIUM SULFATE 1 G/100ML
INJECTION INTRAVENOUS
Status: COMPLETED
Start: 2017-01-24 | End: 2017-01-26

## 2017-01-24 RX ORDER — BUMETANIDE 0.25 MG/ML
2 INJECTION INTRAMUSCULAR; INTRAVENOUS 2 TIMES DAILY
Status: DISCONTINUED | OUTPATIENT
Start: 2017-01-24 | End: 2017-01-25

## 2017-01-24 RX ORDER — ATORVASTATIN CALCIUM 10 MG/1
10 TABLET, FILM COATED ORAL DAILY
Status: DISCONTINUED | OUTPATIENT
Start: 2017-01-24 | End: 2017-02-22 | Stop reason: HOSPADM

## 2017-01-24 RX ORDER — INSULIN GLARGINE 100 [IU]/ML
16 INJECTION, SOLUTION SUBCUTANEOUS DAILY
Status: DISCONTINUED | OUTPATIENT
Start: 2017-01-24 | End: 2017-02-01

## 2017-01-24 RX ADMIN — CEFAZOLIN SODIUM 1 G: 1 INJECTION, POWDER, FOR SOLUTION INTRAMUSCULAR; INTRAVENOUS at 22:16

## 2017-01-24 RX ADMIN — ATORVASTATIN CALCIUM 10 MG: 10 TABLET, FILM COATED ORAL at 09:10

## 2017-01-24 RX ADMIN — BUMETANIDE 2 MG: 0.25 INJECTION, SOLUTION INTRAMUSCULAR; INTRAVENOUS at 18:04

## 2017-01-24 RX ADMIN — CEFAZOLIN SODIUM 1 G: 1 INJECTION, POWDER, FOR SOLUTION INTRAMUSCULAR; INTRAVENOUS at 00:02

## 2017-01-24 RX ADMIN — Medication 10 ML: at 22:18

## 2017-01-24 RX ADMIN — AMIODARONE HYDROCHLORIDE 1 MG/MIN: 50 INJECTION, SOLUTION INTRAVENOUS at 09:44

## 2017-01-24 RX ADMIN — OXYCODONE HYDROCHLORIDE AND ACETAMINOPHEN 2 TABLET: 5; 325 TABLET ORAL at 09:09

## 2017-01-24 RX ADMIN — Medication 10 ML: at 09:45

## 2017-01-24 RX ADMIN — Medication 2 MG: at 19:57

## 2017-01-24 RX ADMIN — SODIUM CHLORIDE 0.5 MG/HR: 900 INJECTION, SOLUTION INTRAVENOUS at 02:02

## 2017-01-24 RX ADMIN — MILRINONE LACTATE 0.38 MCG/KG/MIN: 200 INJECTION, SOLUTION INTRAVENOUS at 16:21

## 2017-01-24 RX ADMIN — DOCUSATE SODIUM AND SENNOSIDES 1 TABLET: 8.6; 5 TABLET, FILM COATED ORAL at 09:11

## 2017-01-24 RX ADMIN — BUMETANIDE 2 MG: 0.25 INJECTION, SOLUTION INTRAMUSCULAR; INTRAVENOUS at 09:10

## 2017-01-24 RX ADMIN — PANTOPRAZOLE SODIUM 40 MG: 40 TABLET, DELAYED RELEASE ORAL at 12:25

## 2017-01-24 RX ADMIN — Medication 400 MG: at 09:10

## 2017-01-24 RX ADMIN — CEFAZOLIN SODIUM 1 G: 1 INJECTION, POWDER, FOR SOLUTION INTRAMUSCULAR; INTRAVENOUS at 08:26

## 2017-01-24 RX ADMIN — Medication 81 MG: at 09:10

## 2017-01-24 RX ADMIN — ONDANSETRON 4 MG: 2 INJECTION INTRAMUSCULAR; INTRAVENOUS at 18:53

## 2017-01-24 RX ADMIN — MILRINONE LACTATE 0.38 MCG/KG/MIN: 200 INJECTION, SOLUTION INTRAVENOUS at 04:19

## 2017-01-24 RX ADMIN — MAGNESIUM SULFATE HEPTAHYDRATE 1 G: 1 INJECTION, SOLUTION INTRAVENOUS at 19:32

## 2017-01-24 RX ADMIN — OXYCODONE HYDROCHLORIDE AND ACETAMINOPHEN 2 TABLET: 5; 325 TABLET ORAL at 22:16

## 2017-01-24 RX ADMIN — HEPARIN SODIUM AND DEXTROSE 22 UNITS/KG/HR: 10000; 5 INJECTION INTRAVENOUS at 14:11

## 2017-01-24 RX ADMIN — CEFAZOLIN SODIUM 1 G: 1 INJECTION, POWDER, FOR SOLUTION INTRAMUSCULAR; INTRAVENOUS at 14:32

## 2017-01-24 RX ADMIN — TOLVAPTAN 15 MG: 15 TABLET ORAL at 09:10

## 2017-01-24 RX ADMIN — OXYCODONE HYDROCHLORIDE AND ACETAMINOPHEN 2 TABLET: 5; 325 TABLET ORAL at 18:04

## 2017-01-24 RX ADMIN — AMIODARONE HYDROCHLORIDE 1 MG/MIN: 50 INJECTION, SOLUTION INTRAVENOUS at 01:20

## 2017-01-24 RX ADMIN — SODIUM CHLORIDE 9 ML/HR: 900 INJECTION, SOLUTION INTRAVENOUS at 04:23

## 2017-01-24 RX ADMIN — INSULIN GLARGINE 16 UNITS: 100 INJECTION, SOLUTION SUBCUTANEOUS at 09:55

## 2017-01-24 RX ADMIN — AMIODARONE HYDROCHLORIDE 1 MG/MIN: 50 INJECTION, SOLUTION INTRAVENOUS at 18:04

## 2017-01-24 RX ADMIN — INSULIN LISPRO 2 UNITS: 100 INJECTION, SOLUTION INTRAVENOUS; SUBCUTANEOUS at 09:37

## 2017-01-24 RX ADMIN — OXYCODONE HYDROCHLORIDE AND ACETAMINOPHEN 2 TABLET: 5; 325 TABLET ORAL at 04:17

## 2017-01-24 RX ADMIN — TRAZODONE HYDROCHLORIDE 50 MG: 50 TABLET ORAL at 22:16

## 2017-01-24 RX ADMIN — SODIUM CHLORIDE 10 ML/HR: 900 INJECTION, SOLUTION INTRAVENOUS at 04:23

## 2017-01-24 RX ADMIN — Medication 10 ML: at 16:22

## 2017-01-24 RX ADMIN — GLIMEPIRIDE 2 MG: 2 TABLET ORAL at 12:25

## 2017-01-24 RX ADMIN — Medication 2 MG: at 14:27

## 2017-01-24 NOTE — DIABETES MGMT
DTC Progress Note    Recommendations/ Comments: Blood sugars improved since yesterday. Lantus dose increased today and Amaryl was added around lunch time. DTC will continue to follow blood sugars. Chart reviewed on Fernanda Mc[facility]ain. .    Patient is a 61 y.o. male with newly diagnosed Type 2 Diabetes. A1c:   Lab Results   Component Value Date/Time    Hemoglobin A1c 13.5 01/19/2017 05:05 PM    Hemoglobin A1c 13.4 01/18/2017 11:11 PM       Recent Glucose Results: Lab Results   Component Value Date/Time     (H) 01/24/2017 03:56 AM    GLUCPOC 194 (H) 01/24/2017 12:24 PM    GLUCPOC 201 (H) 01/24/2017 08:31 AM    GLUCPOC 218 (H) 01/23/2017 09:17 PM        Lab Results   Component Value Date/Time    Creatinine 1.69 01/24/2017 03:56 AM       Active Orders   Diet    DIET CARDIAC Regular; Consistent Carb 1800kcal; FR 2000ML        PO intake: Patient Vitals for the past 72 hrs:   % Diet Eaten   01/23/17 1800 100 %   01/23/17 1200 100 %   01/23/17 0900 100 %   01/22/17 1500 100 %       Current hospital DM medication: Lantus 16 units, Amaryl 2 mg daily and Humalog for correction, high sensitivity    Will continue to follow as needed.     Thank you  Sunny Flores MS, RN, CDE

## 2017-01-24 NOTE — PROGRESS NOTES
Problem: Diabetes Self-Management  Goal: *Disease process and treatment process  Define diabetes and identify own type of diabetes; list 3 options for treating diabetes. Outcome: Progressing Towards Goal  Discussed oral and insulin meds and effect on blood sugars        Problem: Falls - Risk of  Goal: *Absence of falls  Outcome: Progressing Towards Goal  Remains safe from falls. Compliant with fall prevention measures. Oriented to risk. Problem: Pressure Ulcer - Risk of  Goal: *Prevention of pressure ulcer  Outcome: Progressing Towards Goal  Pt turned and repositioned regularly.      Problem: Heart Failure: Day 4  Goal: Off Pathway (Use only if patient is Off Pathway)  Variance: Patient slowly responding

## 2017-01-24 NOTE — PROGRESS NOTES
[facility]   [address] [address], [facility] [address], [facility] [place]  Phone: [phone]   [phone]       Nephrology Progress Note  Yimi Pierre.     1950     [id number]  Date of Admission : 1/20/2017 01/24/17    CC: Follow up for CKD/ARF      Assessment and Plan   CHACORTA on CKD :  - CHACORTA likely 2/2 cardiorenal syndrome  - Cr essentially stable since starting milrinone  - cont current care for now  - unclear of baseline Cr but likely has some degree of CKD at baseline from DM2    Hyponatremia :  - likely has chronic hyponatremia from CHF + alcoholism  - his PNA can be playing a role as well  - cortisol, TSH ok, urine osms 322 - c/w some excess ADH production  - Na continuing to drop  - tolvaptan 15mg x 1 now  - follow serial Na levels    Acute on Chronic Systolic CHF w/ severe CMP  - echo with EF 5-10%, severely dilated LV  - Multivessel CAD : being evaluated for CABG   - On Milrinone     Cellulitis RLE w/ Pustular lesions     Pulm HTN     Chronic smoker w/VENANCIO -PNA     Interval History:  Seen and examined. Feeling better this AM.  Cr stable. UOP > 3.7 liters, net neg 550cc liters in the past 24 hours. Na continues to drop. Review of Systems: Pertinent items are noted in HPI.     Current Medications:   Current Facility-Administered Medications   Medication Dose Route Frequency    bumetanide (BUMEX) injection 2 mg  2 mg IntraVENous BID    tolvaptan (SAMSCA) tablet 15 mg  15 mg Oral ONCE    amiodarone (CORDARONE) 450 mg in dextrose 5% 250 mL infusion  0.5-1 mg/min IntraVENous TITRATE    traZODone (DESYREL) tablet 50 mg  50 mg Oral QHS PRN    senna-docusate (PERICOLACE) 8.6-50 mg per tablet 1 Tab  1 Tab Oral DAILY    ceFAZolin (ANCEF) 1 g in 0.9% sodium chloride (MBP/ADV) 50 mL  1 g IntraVENous Q8H    0.9% sodium chloride infusion  10 mL/hr IntraVENous CONTINUOUS    vasopressin (VASOSTRICT) 20 Units in 0.9% sodium chloride 100 mL infusion  0.01-0.04 Units/min IntraVENous TITRATE  sodium chloride (NS) flush 5-10 mL  5-10 mL InterCATHeter Q8H    sodium chloride (NS) flush 5-10 mL  5-10 mL InterCATHeter PRN    acetaminophen (TYLENOL) tablet 650 mg  650 mg Oral Q6H PRN    aspirin delayed-release tablet 81 mg  81 mg Oral DAILY    glucagon (GLUCAGEN) injection 1 mg  1 mg IntraMUSCular PRN    glucose chewable tablet 16 g  4 Tab Oral PRN    insulin glargine (LANTUS) injection 10 Units  10 Units SubCUTAneous DAILY    insulin lispro (HUMALOG) injection   SubCUTAneous AC&HS    magnesium oxide (MAG-OX) tablet 400 mg  400 mg Oral DAILY    nicotine (NICODERM CQ) 21 mg/24 hr patch 1 Patch  1 Patch TransDERmal Q24H    0.9% sodium chloride infusion  9 mL/hr IntraVENous CONTINUOUS    sodium chloride (NS) flush 5-10 mL  5-10 mL IntraVENous Q8H    sodium chloride (NS) flush 5-10 mL  5-10 mL IntraVENous PRN    albuterol (PROVENTIL VENTOLIN) nebulizer solution 2.5 mg  2.5 mg Nebulization Q4H PRN    heparin 25,000 units in D5W 250 ml infusion  12-25 Units/kg/hr IntraVENous TITRATE    morphine injection 2 mg  2 mg IntraVENous Q4H PRN    diphenhydrAMINE (BENADRYL) capsule 25 mg  25 mg Oral QHS PRN    oxyCODONE-acetaminophen (PERCOCET) 5-325 mg per tablet 2 Tab  2 Tab Oral Q4H PRN    milrinone (PRIMACOR) 20 MG/100 ML D5W infusion  0.375 mcg/kg/min IntraVENous CONTINUOUS    ELECTROLYTE REPLACEMENT PROTOCOL  1 Each Other PRN      No Known Allergies    Objective:  Vitals:    Vitals:    01/24/17 0400 01/24/17 0500 01/24/17 0600 01/24/17 0700   BP:       Pulse: 99 89 95 91   Resp: 22 16 18 20   Temp: 97.6 °F (36.4 °C)      SpO2: 93% 97% 98% 95%   Weight:    78.3 kg (172 lb 9.9 oz)   Height:         Intake and Output:     01/22 1901 - 01/24 0700  In: 0677 [P.O.:800;  I.V.:3164]  Out: 5820 [Urine:5820]    Physical Examination:  General: Awake, alert  Neck:  JVD +  Resp:  diminished on L side   CV:  RRR,  no murmur or rub,+ LE edema  GI:  Soft, NT, + Bowel sounds, no hepatosplenomegaly  Neurologic:  Non focal  Skin:  RLE cellulitis w/ pustules   :  No mendoza    []    High complexity decision making was performed  []    Patient is at high-risk of decompensation with multiple organ involvement    Lab Data Personally Reviewed: I have reviewed all the pertinent labs, microbiology data and radiology studies during assessment. Recent Labs      01/24/17   0356  01/23/17   1811  01/23/17   1209  01/23/17   0546  01/23/17   0111   01/22/17   0401   NA  123*   --   124*  125*   --    --   127*   K  4.4  3.9  4.1  3.9  4.0   < >  3.7   CL  84*   --   84*  84*   --    --   86*   CO2  32   --   33*  35*   --    --   32   GLU  176*   --   214*  244*   --    --   248*   BUN  28*   --   28*  27*   --    --   35*   CREA  1.69*   --   1.60*  1.63*   --    --   1.52*   CA  8.7   --   8.5  8.4*   --    --   8.2*   MG  2.0  1.7  2.1  2.1  1.8   < >  1.8   PHOS   --    --   2.5*   --    --    --    --    ALB  2.6*   --   2.8*  2.6*   --    --   2.7*   SGOT  14*   --    --   14*   --    --   10*   ALT  12   --    --   9*   --    --   10*   INR   --    --   1.3*   --    --    --    --     < > = values in this interval not displayed.      Recent Labs      01/24/17   0356  01/23/17   0546  01/22/17   0401   WBC  6.4  6.6  8.5   HGB  11.5*  11.8*  11.9*   HCT  35.8*  35.3*  36.2*   PLT  156  174  205     No results found for: SDES  Lab Results   Component Value Date/Time    Culture result: NO SIGNIFICANT GROWTH 01/19/2017 01:35 AM     Recent Results (from the past 24 hour(s))   EKG, 12 LEAD, INITIAL    Collection Time: 01/23/17 10:13 AM   Result Value Ref Range    Ventricular Rate 99 BPM    Atrial Rate 99 BPM    P-R Interval 172 ms    QRS Duration 112 ms    Q-T Interval 358 ms    QTC Calculation (Bezet) 459 ms    Calculated P Axis 53 degrees    Calculated R Axis 73 degrees    Calculated T Axis -63 degrees    Diagnosis       Normal sinus rhythm  Possible Left atrial enlargement  T wave abnormality, consider inferolateral ischemia  When compared with ECG of 18-JAN-2017 23:07,  premature ventricular complexes are no longer present  QRS duration has decreased  Inverted T waves have replaced nonspecific T wave abnormality in Inferior   leads  Confirmed by Francisco Preston MD ([id number]) on 1/23/2017 5:55:46 PM     MAGNESIUM    Collection Time: 01/23/17 12:09 PM   Result Value Ref Range    Magnesium 2.1 1.6 - 2.4 mg/dL   PTT    Collection Time: 01/23/17 12:09 PM   Result Value Ref Range    aPTT 60.0 (H) 22.1 - 32.5 sec    aPTT, therapeutic range     58.0 - 77.0 SECS   RENAL FUNCTION PANEL    Collection Time: 01/23/17 12:09 PM   Result Value Ref Range    Sodium 124 (L) 136 - 145 mmol/L    Potassium 4.1 3.5 - 5.1 mmol/L    Chloride 84 (L) 97 - 108 mmol/L    CO2 33 (H) 21 - 32 mmol/L    Anion gap 7 5 - 15 mmol/L    Glucose 214 (H) 65 - 100 mg/dL    BUN 28 (H) 6 - 20 MG/DL    Creatinine 1.60 (H) 0.70 - 1.30 MG/DL    BUN/Creatinine ratio 18 12 - 20      GFR est AA 53 (L) >60 ml/min/1.73m2    GFR est non-AA 44 (L) >60 ml/min/1.73m2    Calcium 8.5 8.5 - 10.1 MG/DL    Phosphorus 2.5 (L) 2.6 - 4.7 MG/DL    Albumin 2.8 (L) 3.5 - 5.0 g/dL   PROTHROMBIN TIME + INR    Collection Time: 01/23/17 12:09 PM   Result Value Ref Range    INR 1.3 (H) 0.9 - 1.1      Prothrombin time 13.5 (H) 9.0 - 11.1 sec   GLUCOSE, POC    Collection Time: 01/23/17 12:18 PM   Result Value Ref Range    Glucose (POC) 238 (H) 65 - 100 mg/dL    Performed by Joanne Cha    GLUCOSE, POC    Collection Time: 01/23/17  5:20 PM   Result Value Ref Range    Glucose (POC) 222 (H) 65 - 100 mg/dL    Performed by Joanne Cha    POTASSIUM    Collection Time: 01/23/17  6:11 PM   Result Value Ref Range    Potassium 3.9 3.5 - 5.1 mmol/L   MAGNESIUM    Collection Time: 01/23/17  6:11 PM   Result Value Ref Range    Magnesium 1.7 1.6 - 2.4 mg/dL   POC VENOUS BLOOD GAS    Collection Time: 01/23/17  8:54 PM   Result Value Ref Range    Device: NASAL CANNULA      Flow rate (POC) 2 L/M    FIO2 (POC) 28 %    pH, venous (POC) 7.456 (H) 7.32 - 7.42      pCO2, venous (POC) 42.2 41 - 51 MMHG    pO2, venous (POC) 25 25 - 40 mmHg    HCO3, venous (POC) 29.7 (H) 23.0 - 28.0 MMOL/L    sO2, venous (POC) 49 (L) 65 - 88 %    Base excess, venous (POC) 6 mmol/L    Allens test (POC) N/A      Total resp. rate 16      Site SWAN TAVIA      Specimen type (POC) VENOUS BLOOD     GLUCOSE, POC    Collection Time: 01/23/17  9:17 PM   Result Value Ref Range    Glucose (POC) 218 (H) 65 - 100 mg/dL    Performed by Gino Covarrubias    METABOLIC PANEL, COMPREHENSIVE    Collection Time: 01/24/17  3:56 AM   Result Value Ref Range    Sodium 123 (L) 136 - 145 mmol/L    Potassium 4.4 3.5 - 5.1 mmol/L    Chloride 84 (L) 97 - 108 mmol/L    CO2 32 21 - 32 mmol/L    Anion gap 7 5 - 15 mmol/L    Glucose 176 (H) 65 - 100 mg/dL    BUN 28 (H) 6 - 20 MG/DL    Creatinine 1.69 (H) 0.70 - 1.30 MG/DL    BUN/Creatinine ratio 17 12 - 20      GFR est AA 50 (L) >60 ml/min/1.73m2    GFR est non-AA 41 (L) >60 ml/min/1.73m2    Calcium 8.7 8.5 - 10.1 MG/DL    Bilirubin, total 0.9 0.2 - 1.0 MG/DL    ALT 12 12 - 78 U/L    AST 14 (L) 15 - 37 U/L    Alk.  phosphatase 81 45 - 117 U/L    Protein, total 6.3 (L) 6.4 - 8.2 g/dL    Albumin 2.6 (L) 3.5 - 5.0 g/dL    Globulin 3.7 2.0 - 4.0 g/dL    A-G Ratio 0.7 (L) 1.1 - 2.2     MAGNESIUM    Collection Time: 01/24/17  3:56 AM   Result Value Ref Range    Magnesium 2.0 1.6 - 2.4 mg/dL   CBC W/O DIFF    Collection Time: 01/24/17  3:56 AM   Result Value Ref Range    WBC 6.4 4.1 - 11.1 K/uL    RBC 4.76 4.10 - 5.70 M/uL    HGB 11.5 (L) 12.1 - 17.0 g/dL    HCT 35.8 (L) 36.6 - 50.3 %    MCV 75.2 (L) 80.0 - 99.0 FL    MCH 24.2 (L) 26.0 - 34.0 PG    MCHC 32.1 30.0 - 36.5 g/dL    RDW 15.6 (H) 11.5 - 14.5 %    PLATELET 224 321 - 328 K/uL   PRO-BNP    Collection Time: 01/24/17  3:56 AM   Result Value Ref Range    NT pro-BNP 6446 (H) 0 - 125 PG/ML   VANCOMYCIN, TROUGH    Collection Time: 01/24/17  3:56 AM   Result Value Ref Range    Vancomycin,trough 20.2 (HH) 5.0 - 10.0 ug/mL    Reported dose date: NOT PROVIDED      Reported dose time: NOT PROVIDED      Reported dose: NOT PROVIDED UNITS   PTT    Collection Time: 01/24/17  4:31 AM   Result Value Ref Range    aPTT 88.9 (H) 22.1 - 32.5 sec    aPTT, therapeutic range     58.0 - 77.0 SECS   POC VENOUS BLOOD GAS    Collection Time: 01/24/17  5:00 AM   Result Value Ref Range    Device: NASAL CANNULA      Flow rate (POC) 2 L/M    FIO2 (POC) 28 %    pH, venous (POC) 7.448 (H) 7.32 - 7.42      pCO2, venous (POC) 38.5 (L) 41 - 51 MMHG    pO2, venous (POC) 31 25 - 40 mmHg    HCO3, venous (POC) 26.7 23.0 - 28.0 MMOL/L    sO2, venous (POC) 62 (L) 65 - 88 %    Base excess, venous (POC) 3 mmol/L    Allens test (POC) N/A      Total resp. rate 14      Site SWAN TAVIA      Specimen type (POC) VENOUS BLOOD             I have reviewed the flowsheets. Chart and Pertinent Notes have been reviewed. No change in PMH ,family and social history from Consult note.       Suri Benites MD

## 2017-01-24 NOTE — PROGRESS NOTES
[facility]  Progress Note      Date: January 24, 2017     Admit Date: 1/20/2017    Mr. Lori Joseph is a 61year old male patient admitted who was admitted to [facility] on 1/20 and transferred to [facility] on 1/23 for further evaluation of severe CAD and systolic heart failure. Subjective:     Seen with Dr. Abdullahi Garg and Dr. Jess Galvan    Last 24 hours:   Amio gtt initiated for afib  Na+ continues to trend down  (-) 550 mL w/bumex gtt  Abx changed to cefazolin per ID    This AM:   Up in chair, feels a lot better w/diuresis    Current gtts: Milrinone 0.375 mcg/kg/min, bumex 0.5 mg/min, amiodarone 1 mg/min, MIV      Objective:     Visit Vitals    /67 (BP 1 Location: Left leg, BP Patient Position: At rest)    Pulse 91    Temp 97.6 °F (36.4 °C)    Resp 20    Ht 5' 9.5\" (1.765 m)    Wt 172 lb 9.9 oz (78.3 kg)    SpO2 95%    BMI 25.13 kg/m2        Oxygen Therapy:  Oxygen Therapy  O2 Sat (%): 95 % (01/24/17 0700)  Pulse via Oximetry: 91 beats per minute (01/24/17 0700)  O2 Device: Nasal cannula (01/24/17 0400)  O2 Flow Rate (L/min): 2 l/min (01/24/17 0400)    CXR: persistent LLL opacification     Admission Weight: Last Weight   Weight: 178 lb 2.1 oz (80.8 kg) Weight: 172 lb 9.9 oz (78.3 kg)       Intake / Output / Drain:  Last 24 hrs.:     Intake/Output Summary (Last 24 hours) at 01/24/17 0839  Last data filed at 01/24/17 0659   Gross per 24 hour   Intake          3135.51 ml   Output             3545 ml   Net          -409.49 ml     Total: (-) 10.4L since admission    EXAM:  Neuro: A&O  Resp: bibasilar crackles  CV: tachy 100's. S1 S2.    GI: +BS Soft NT/ND  : voiding  Ext: erythematous BLE with weeping bullae covered w/dressings. Slightly improved from yesterday. +2 pitting BLE.      Recent Results (from the past 24 hour(s))   EKG, 12 LEAD, INITIAL    Collection Time: 01/23/17 10:13 AM   Result Value Ref Range    Ventricular Rate 99 BPM    Atrial Rate 99 BPM    P-R Interval 172 ms    QRS Duration 112 ms    Q-T Interval 358 ms    QTC Calculation (Bezet) 459 ms    Calculated P Axis 53 degrees    Calculated R Axis 73 degrees    Calculated T Axis -63 degrees    Diagnosis       Normal sinus rhythm  Possible Left atrial enlargement  T wave abnormality, consider inferolateral ischemia  When compared with ECG of 18-JAN-2017 23:07,  premature ventricular complexes are no longer present  QRS duration has decreased  Inverted T waves have replaced nonspecific T wave abnormality in Inferior   leads  Confirmed by Ady Virgen MD ([id number]) on 1/23/2017 5:55:46 PM     MAGNESIUM    Collection Time: 01/23/17 12:09 PM   Result Value Ref Range    Magnesium 2.1 1.6 - 2.4 mg/dL   PTT    Collection Time: 01/23/17 12:09 PM   Result Value Ref Range    aPTT 60.0 (H) 22.1 - 32.5 sec    aPTT, therapeutic range     58.0 - 77.0 SECS   RENAL FUNCTION PANEL    Collection Time: 01/23/17 12:09 PM   Result Value Ref Range    Sodium 124 (L) 136 - 145 mmol/L    Potassium 4.1 3.5 - 5.1 mmol/L    Chloride 84 (L) 97 - 108 mmol/L    CO2 33 (H) 21 - 32 mmol/L    Anion gap 7 5 - 15 mmol/L    Glucose 214 (H) 65 - 100 mg/dL    BUN 28 (H) 6 - 20 MG/DL    Creatinine 1.60 (H) 0.70 - 1.30 MG/DL    BUN/Creatinine ratio 18 12 - 20      GFR est AA 53 (L) >60 ml/min/1.73m2    GFR est non-AA 44 (L) >60 ml/min/1.73m2    Calcium 8.5 8.5 - 10.1 MG/DL    Phosphorus 2.5 (L) 2.6 - 4.7 MG/DL    Albumin 2.8 (L) 3.5 - 5.0 g/dL   PROTHROMBIN TIME + INR    Collection Time: 01/23/17 12:09 PM   Result Value Ref Range    INR 1.3 (H) 0.9 - 1.1      Prothrombin time 13.5 (H) 9.0 - 11.1 sec   GLUCOSE, POC    Collection Time: 01/23/17 12:18 PM   Result Value Ref Range    Glucose (POC) 238 (H) 65 - 100 mg/dL    Performed by Duane Herman    GLUCOSE, POC    Collection Time: 01/23/17  5:20 PM   Result Value Ref Range    Glucose (POC) 222 (H) 65 - 100 mg/dL    Performed by Duane Herman    POTASSIUM    Collection Time: 01/23/17  6:11 PM   Result Value Ref Range    Potassium 3.9 3.5 - 5.1 mmol/L   MAGNESIUM    Collection Time: 01/23/17  6:11 PM   Result Value Ref Range    Magnesium 1.7 1.6 - 2.4 mg/dL   POC VENOUS BLOOD GAS    Collection Time: 01/23/17  8:54 PM   Result Value Ref Range    Device: NASAL CANNULA      Flow rate (POC) 2 L/M    FIO2 (POC) 28 %    pH, venous (POC) 7.456 (H) 7.32 - 7.42      pCO2, venous (POC) 42.2 41 - 51 MMHG    pO2, venous (POC) 25 25 - 40 mmHg    HCO3, venous (POC) 29.7 (H) 23.0 - 28.0 MMOL/L    sO2, venous (POC) 49 (L) 65 - 88 %    Base excess, venous (POC) 6 mmol/L    Allens test (POC) N/A      Total resp. rate 16      Site SWAN TAVIA      Specimen type (POC) VENOUS BLOOD     GLUCOSE, POC    Collection Time: 01/23/17  9:17 PM   Result Value Ref Range    Glucose (POC) 218 (H) 65 - 100 mg/dL    Performed by Tova Folk    METABOLIC PANEL, COMPREHENSIVE    Collection Time: 01/24/17  3:56 AM   Result Value Ref Range    Sodium 123 (L) 136 - 145 mmol/L    Potassium 4.4 3.5 - 5.1 mmol/L    Chloride 84 (L) 97 - 108 mmol/L    CO2 32 21 - 32 mmol/L    Anion gap 7 5 - 15 mmol/L    Glucose 176 (H) 65 - 100 mg/dL    BUN 28 (H) 6 - 20 MG/DL    Creatinine 1.69 (H) 0.70 - 1.30 MG/DL    BUN/Creatinine ratio 17 12 - 20      GFR est AA 50 (L) >60 ml/min/1.73m2    GFR est non-AA 41 (L) >60 ml/min/1.73m2    Calcium 8.7 8.5 - 10.1 MG/DL    Bilirubin, total 0.9 0.2 - 1.0 MG/DL    ALT 12 12 - 78 U/L    AST 14 (L) 15 - 37 U/L    Alk.  phosphatase 81 45 - 117 U/L    Protein, total 6.3 (L) 6.4 - 8.2 g/dL    Albumin 2.6 (L) 3.5 - 5.0 g/dL    Globulin 3.7 2.0 - 4.0 g/dL    A-G Ratio 0.7 (L) 1.1 - 2.2     MAGNESIUM    Collection Time: 01/24/17  3:56 AM   Result Value Ref Range    Magnesium 2.0 1.6 - 2.4 mg/dL   CBC W/O DIFF    Collection Time: 01/24/17  3:56 AM   Result Value Ref Range    WBC 6.4 4.1 - 11.1 K/uL    RBC 4.76 4.10 - 5.70 M/uL    HGB 11.5 (L) 12.1 - 17.0 g/dL    HCT 35.8 (L) 36.6 - 50.3 %    MCV 75.2 (L) 80.0 - 99.0 FL    MCH 24.2 (L) 26.0 - 34.0 PG    MCHC 32.1 30.0 - 36.5 g/dL    RDW 15.6 (H) 11.5 - 14.5 %    PLATELET 945 654 - 814 K/uL   PRO-BNP    Collection Time: 01/24/17  3:56 AM   Result Value Ref Range    NT pro-BNP 6446 (H) 0 - 125 PG/ML   VANCOMYCIN, TROUGH    Collection Time: 01/24/17  3:56 AM   Result Value Ref Range    Vancomycin,trough 20.2 (HH) 5.0 - 10.0 ug/mL    Reported dose date: NOT PROVIDED      Reported dose time: NOT PROVIDED      Reported dose: NOT PROVIDED UNITS   PTT    Collection Time: 01/24/17  4:31 AM   Result Value Ref Range    aPTT 88.9 (H) 22.1 - 32.5 sec    aPTT, therapeutic range     58.0 - 77.0 SECS   POC VENOUS BLOOD GAS    Collection Time: 01/24/17  5:00 AM   Result Value Ref Range    Device: NASAL CANNULA      Flow rate (POC) 2 L/M    FIO2 (POC) 28 %    pH, venous (POC) 7.448 (H) 7.32 - 7.42      pCO2, venous (POC) 38.5 (L) 41 - 51 MMHG    pO2, venous (POC) 31 25 - 40 mmHg    HCO3, venous (POC) 26.7 23.0 - 28.0 MMOL/L    sO2, venous (POC) 62 (L) 65 - 88 %    Base excess, venous (POC) 3 mmol/L    Allens test (POC) N/A      Total resp.  rate 14      Site SWAN TAVIA      Specimen type (POC) VENOUS BLOOD       Current Facility-Administered Medications   Medication Dose Route Frequency    bumetanide (BUMEX) injection 2 mg  2 mg IntraVENous BID    tolvaptan (SAMSCA) tablet 15 mg  15 mg Oral ONCE    amiodarone (CORDARONE) 450 mg in dextrose 5% 250 mL infusion  0.5-1 mg/min IntraVENous TITRATE    traZODone (DESYREL) tablet 50 mg  50 mg Oral QHS PRN    senna-docusate (PERICOLACE) 8.6-50 mg per tablet 1 Tab  1 Tab Oral DAILY    ceFAZolin (ANCEF) 1 g in 0.9% sodium chloride (MBP/ADV) 50 mL  1 g IntraVENous Q8H    0.9% sodium chloride infusion  10 mL/hr IntraVENous CONTINUOUS    vasopressin (VASOSTRICT) 20 Units in 0.9% sodium chloride 100 mL infusion  0.01-0.04 Units/min IntraVENous TITRATE    sodium chloride (NS) flush 5-10 mL  5-10 mL InterCATHeter Q8H    sodium chloride (NS) flush 5-10 mL  5-10 mL InterCATHeter PRN    acetaminophen (TYLENOL) tablet 650 mg  650 mg Oral Q6H PRN    aspirin delayed-release tablet 81 mg  81 mg Oral DAILY    glucagon (GLUCAGEN) injection 1 mg  1 mg IntraMUSCular PRN    glucose chewable tablet 16 g  4 Tab Oral PRN    insulin glargine (LANTUS) injection 10 Units  10 Units SubCUTAneous DAILY    insulin lispro (HUMALOG) injection   SubCUTAneous AC&HS    magnesium oxide (MAG-OX) tablet 400 mg  400 mg Oral DAILY    nicotine (NICODERM CQ) 21 mg/24 hr patch 1 Patch  1 Patch TransDERmal Q24H    0.9% sodium chloride infusion  9 mL/hr IntraVENous CONTINUOUS    sodium chloride (NS) flush 5-10 mL  5-10 mL IntraVENous Q8H    sodium chloride (NS) flush 5-10 mL  5-10 mL IntraVENous PRN    albuterol (PROVENTIL VENTOLIN) nebulizer solution 2.5 mg  2.5 mg Nebulization Q4H PRN    heparin 25,000 units in D5W 250 ml infusion  12-25 Units/kg/hr IntraVENous TITRATE    morphine injection 2 mg  2 mg IntraVENous Q4H PRN    diphenhydrAMINE (BENADRYL) capsule 25 mg  25 mg Oral QHS PRN    oxyCODONE-acetaminophen (PERCOCET) 5-325 mg per tablet 2 Tab  2 Tab Oral Q4H PRN    milrinone (PRIMACOR) 20 MG/100 ML D5W infusion  0.375 mcg/kg/min IntraVENous CONTINUOUS    ELECTROLYTE REPLACEMENT PROTOCOL  1 Each Other PRN       ASSESSMENT/PLAN:    ICM, Stage D, NYHA Class IV, EF 7%: Continue IV milrinone at 0.3 mcg/kg/min. Transition bumex from gtt to bolus dosing - 2 mg BID. No BB d/t hypotension and no ACE-1/ARB d/t hypotension. NT pro-BNP 6446. Repeat TTE 1/23/17 shows EF 7% with severe diffuse HK, moderate to severe dilation of RV, biatrial enlargement, mild MR, trivial TR, and large left pleural effusion. Continue daily weights, strict I/O, Na+ restricted diet. HF education. Pulmonary hypertension: Continue milrinone. Hyponatremia: Na+ down to 123. Tolvaptan 15 mg x 1 this AM.  Transition bumex gtt to bolus dosing and remove fluid restriction while on Tolvaptan. Keep mendoza in for strict I/O.    Monitor closely for neurological changes. CAD with LM disease: Prohibitive surgical risk d/t low EF. Continue ASA, heparin gtt. Begin statin. No BB d/t hypotension. Consider cardiac MRI - if viability found, will need PFTs, carotids, and ABIs. Smoking cessation counseling in progress. Atrial fibrillation: Continue amiodarone and heparin gtts. DM Type II: A1C 13.5. DTC consulted - will increase Lantus to 16 units and add Amaryl 2 mg ACB per recommendations - appreciate input. Continue AccuChecks AC/HS, continue SSI. Consistent carb diet, continue diabetic education. Tobacco abuse: Nicotine patch. Continue smoking cessation education. Lower extremity venous stasis disease vs. Cellulitis: Improving on antibiotics. Therapy changed from vanc and levaquin to cefazolin 1g q8h per ID - appreciate input. Keep weeping areas clean, dry, and covered. Ppx: Begin pantoprazole for SUP, heparin gtt covers DVT ppx. Lines 1days old. Dispo: Remain in CVICU today.   Possible remove lines in AM.           Signed By: Ciara Newby NP      Saw patient, agree with above  Risk of morbidity and mortality - high  Medical decision making - high complexity

## 2017-01-24 NOTE — PROGRESS NOTES
0800 - Report received from Poly Antoine [facility]  1500 - Morphine given before dressing change, dressings to BLE changed per order, pt tolerated well. 1855 - Pt vomited moderate amount of mostly solid food. Reports he has been nauseated off and on all day. Zofran given, pt reports relief. 2000 - Bedside and Verbal shift change report given to Poly Antoine RN (oncoming nurse) by Magnolia Quintero RN (offgoing nurse). Report included the following information SBAR, Intake/Output, Recent Results and Cardiac Rhythm NSR.

## 2017-01-24 NOTE — ROUTINE PROCESS
Patient listed as not having a primary care physician. Presented patient with the [facility] packet to select a primary care physician within his residential area. Patient has selected [id number] [facility] [phone]. Will schedule follow up appointment prior to discharge.   Eligio Menendez, Care Management Specialist.

## 2017-01-24 NOTE — PROGRESS NOTES
16:45 - 17:25    Mr Boston Quinn is feeling fairly well however his ECHO showed an EF of only 7% despite being over 11 L negative since transfer to our ICU. He had moderate to severe RV dysfunction as well on the ECHO. Overall we still have a ways to go with Mr Boston Quinn. Will keep up the diuretics as long as his creatinine doesn't bump. If we can get his EF up to 20-25%, will get myocardial viability MRI. Until then will continue with current therapy.         Intake/Output Summary (Last 24 hours) at 01/24/17 1712  Last data filed at 01/24/17 1600   Gross per 24 hour   Intake          2678.14 ml   Output             3370 ml   Net          -691.86 ml       Neuro - A x O x 3    Pulmonary - pulmonary edema; still moderate pulmonary congestion on CXR; sat's 97% on 2 L    Cardiac - acute on chronic NYHA class IV systolic heart failure, left main and 3 vessel CAD, A-fib, moderate to severe right heart failure; pulmonary HTN; C.I. 2.0 - 2.3, PA pressures 50'/30's, CVP 10-15; continuing milrinone @ 0.375; on amiodarone gtt     Renal - CHACORTA, CKD (last creatinine 1.7); on Bumex 2 mg IV BID and torsemide; (-) 700 cc over last 24 hours    GI - tolerating regular diet; no issues    M/S - severe cellulitis and erythematous changes in lower extremities; 4+ lower extremity edema (R worse than L)    Psych - no obvious anxiety or depression     Endocrine - DM type II; on SSI and oral meds    Fluids / Electrolytes - hyponatremia (Na 123); started Tolvapten 15 mg today     Infection disease - ancef for cellulitis     Total critical care time - 40 minutes (CPT 05822)

## 2017-01-24 NOTE — PROGRESS NOTES
[facility] Progress Note      NAME:  Fernanda Green :   1950   MRN:   [id number]   PCP:  None  CARD:   Dr. Estee Anglin    Date:  2017     Fernanda Green is a 61 y.o. male with a who presented for further evaluation of severe CAD and systolic heart failure. Subjective:   He was initially seen at [facility] 3 years ago and told that he had heart failure with LVEF 30%. He was lost to follow up due to lack of insurance and has not been seen by a physician in the interim. He complains of progressive fatigue, NAVARRO, PND, and edema over the past year, prompting presentation to [facility]. He also complains of lower extremity bullae, weeping, and pain. He denies palpitations, presyncope, syncope, or chest pain. Since admission, he has diuresed well on IV milrinone and bumex. He was started on IV amiodarone for AF. Objective:     Visit Vitals    /67 (BP 1 Location: Left leg, BP Patient Position: At rest)    Pulse 87    Temp 97.6 °F (36.4 °C)    Resp 19    Ht 5' 9.5\" (1.765 m)    Wt 78.3 kg (172 lb 9.9 oz)    SpO2 97%    BMI 25.13 kg/m2          General:  fatigued    HEENT: Normocephalic, EOMI, PERRLA, Hearing intact, trachea mid-line    Neck:  supple, no significant adenopathy, carotids upstroke normal bilaterally, no bruits    CVP:  12  cm  ( ++ ) HJR    Heart:  Diminished PMI    Normal S1 and S2, S3 gallop    Murmur: 2/6 systolic murmur    Lungs: rales bilaterally    Abdomen: soft, non-tender. Bowel sounds normal. +HSM    Extremity: Warm, red on the right lower extremity with 4+ pitting edema bilaterally    Neuro: Alert and oriented to person, place, and time; normal strength and tone. Normal symmetric reflexes. Normal coordination and gait       1901 -  0700  In: 3964 [P.O.:800;  I.V.:3164]  Out: 5820 [Urine:5820]  O2 Flow Rate (L/min): 2 l/min O2 Device: Nasal cannula  Temp (24hrs), Av.7 °F (36.5 °C), Min:97.4 °F (36.3 °C), Max:98.2 °F (36.8 °C)    Hemodynamics:  CO 4.3 Lpm  CI 2.2 L/min/m2  PAP 63/36 mmHg  SvO2 49%        Care Plan discussed with:    Comments   Patient x    Family  x    RN x    Care Manager                    Consultant:          Past History:     Past Medical History   Diagnosis Date    Cardiomyopathy ([address] 1/20/2017     A. Echo (1/19/17):  EF 5-10% with severe GHK,. Mildly dil LA. Mild TR. PASP 46. No past surgical history on file. Social History   Substance Use Topics    Smoking status: Not on file    Smokeless tobacco: Not on file    Alcohol use Not on file        No family history on file. Allergies:   No Known Allergies       Data Review:     CXR:   CXR Results  (Last 48 hours)               01/24/17 0423  XR CHEST PORT Final result    Impression:  IMPRESSION:    Central pulmonary vascular congestive change. Left pleural effusion and basilar atelectasis/airspace disease. Narrative:  INDICATION:  PAC. COMPARISON:  1/23/2017. FINDINGS:     An AP portable view was obtained of the chest.     The right jugular Sanford-Val catheter remains in place. The heart is minimally enlarged     There is central pulmonary vascular congestive change. Persistent opacification of the left lung base by a left pleural effusion and   airspace disease/atelectasis is seen             01/23/17 0450  XR CHEST PORT Final result    Impression:  IMPRESSION:    Persistent left lower opacification. Narrative:  INDICATION:  PAC. COMPARISON:  1/22/2017. FINDINGS:     An AP portable view was obtained of the chest.     The right Sanford-Val catheter tip right main pulmonary artery is seen in place. .   The heart is enlarged. Persistent left lower opacification compatible with airspace disease and pleural   effusion is seen.                          Echocardiogram:   Echo Results  (Last 48 hours)               01/23/17 1214  2D ECHO COMPLETE ADULT (TTE) W OR WO CONTR Final result Narrative:  Darryl Thakur [address]   [place], [address]   [phone]       Transthoracic Echocardiogram       Patient: Sergey Obregon   MRN: [id number]   ACCT #: [de-identified]   : [id number]   Age: 61 years   Gender: Male   Height: 69 in   Weight: 168.7 lb   BSA: 1.92 m squared   BP: 111 / 60 mmHg   Study date: 2017   Status: Routine   Location: CVI   [facility] ACC #: [id number]       Allergies: NO KNOWN ALLERGIES       Ordering Physician:  Karli Silva. Ramya Brennan MD   Reading Group:  *CAV Group   Technologist:  Deidre Enriquez RDCS   Reading Physician:  Mario Pena. STEVE Ramos Kwabena:   Left ventricle: Systolic function was severely reduced by EF (biplane   method of disks). Ejection fraction was estimated to be 7 %. There was   severe diffuse hypokinesis. Ventricular septum: There was systolic and diastolic flattening. Right ventricle: The ventricle was moderately to severely dilated. Systolic function was mildly to moderately reduced. Left atrium: The atrium was moderately dilated. Right atrium: The atrium was moderately dilated. Mitral valve: There was mild thickening. 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 There was mild regurgitation. Aortic valve: The valve was trileaflet. Leaflets exhibited sclerosis   without stenosis. Pericardium: There was a large left pleural effusion. INDICATIONS: Assess left ventricular function. PROCEDURE: This was a routine study. The study included complete 2D   imaging, M-mode, complete spectral Doppler, and color Doppler. The heart   rate was 94 bpm, at the start of the study. Systolic blood pressure was   111 mmHg, at the start of the study. Diastolic blood pressure was 60 mmHg,   at the start of the study. Image quality was adequate. LEFT VENTRICLE: Size was normal. Systolic function was severely reduced by   EF (biplane method of disks).  Ejection fraction was estimated to be 7 %. There was severe diffuse hypokinesis. Wall thickness was normal.       VENTRICULAR SEPTUM: There was systolic and diastolic flattening. RIGHT VENTRICLE: The ventricle was moderately to severely dilated. Systolic function was mildly to moderately reduced. A pacing wire was   present. LEFT ATRIUM: The atrium was moderately dilated. ATRIAL SEPTUM: The atrial septum appeared intact. RIGHT ATRIUM: The atrium was moderately dilated. MITRAL VALVE: There was mild thickening. 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 DOPPLER: There was mild   regurgitation. AORTIC VALVE: The valve was trileaflet. Leaflets exhibited sclerosis   without stenosis. DOPPLER: Transaortic velocity was within the normal   range. There was no stenosis. There was no regurgitation. TRICUSPID VALVE: Normal valve structure. DOPPLER: There was trivial   regurgitation. PULMONIC VALVE: Not well visualized, but normal Doppler findings. AORTA: The root exhibited normal size. PERICARDIUM: There was no pericardial effusion. There was a large left   pleural effusion.        SYSTEM MEASUREMENT TABLES       2D   Ao Diam: 3.86 cm   LA Diam: 4.78 cm   LAAs A2C: 29.04 cm2   LAAs A4C: 25.47 cm2   LAESV A-L A2C: 112.72 ml   LAESV A-L A4C: 83.65 ml   LAESV Index (A-L): 51.48 ml/m2   LAESV MOD A2C: 111.33 ml   LAESV MOD A4C: 77.33 ml   LAESV(A-L): 98.85 ml   LALs A2C: 6.35 cm   LALs A4C: 6.58 cm   LVOT Diam: 2.34 cm   %FS: 4.07 %   EDV(Teich): 185.56 ml   EF Biplane: 6.61 %   EF(Teich): 9.07 %   ESV(Teich): 168.73 ml   IVSd: 1.11 cm   LVEDV MOD A2C: 301.28 ml   LVEDV MOD A4C: 199.89 ml   LVEDV MOD BP: 250.58 ml   LVEF MOD A2C: 7.36 %   LVEF MOD A4C: 5.3 %   LVESV MOD A2C: 279.1 ml   LVESV MOD A4C: 189.3 ml   LVESV MOD BP: 234.01 ml   LVIDd: 6.08 cm   LVIDs: 5.83 cm   LVLd A2C: 10.31 cm   LVLd A4C: 9.87 cm   LVLs A2C: 9.59 cm   LVLs A4C: 9.2 cm   LVPWd: 1.14 cm SV MOD A2C: 22.18 ml   SV MOD A4C: 10.58 ml   SV(Teich): 16.83 ml   RVIDd: 4.26 cm       CW   AV Env. Ti: 205.52 ms   AV VTI: 14.74 cm   AV Vmax: 0.89 m/s   AV Vmean: 0.72 m/s   AV maxPG: 3.14 mmHg   AV meanP.22 mmHg   PV Vmax: 0.71 m/s   PV maxP mmHg   TR Vmax: 3.16 m/s   TR maxP.88 mmHg       PW   MIRELLA (VTI): 2.68 cm2   MIRELLA Vmax: 3.27 cm2   AVAI (VTI): 0 cm2/m2   AVAI Vmax: 0 cm2/m2   LVOT Env. Ti: 194.1 ms   LVOT VTI: 9.18 cm   LVOT Vmax: 0.67 m/s   LVOT Vmean: 0.47 m/s   LVOT maxP.81 mmHg   LVOT meanP.05 mmHg   E' Lat: 0.06 m/s   E' Sept: 0.05 m/s   E/E' Lat: 17.82   E/E' Sept: 23.43   MV A Glenn: 0.39 m/s   MV Dec Bee: 12.17 m/s2   MV DecT: 91.66 ms   MV E Glenn: 1.12 m/s   MV E/A Ratio: 2.83   MV PHT: 26.58 ms   MVA By PHT: 8.28 cm2   LVSI Dopp: 20.55 ml/m2   LVSV Dopp: 39.46 ml       Prepared and E-signed by       Miesha Kent M.D. Signed 2017 13:56:52                 No results found for this visit on 17. ECG:  EKG: ST with occasional PVC, NSIVCD, LAE    LABS:  Recent Results (from the past 24 hour(s))   GLUCOSE, POC    Collection Time: 17  5:20 PM   Result Value Ref Range    Glucose (POC) 222 (H) 65 - 100 mg/dL    Performed by Jennifer Horan    POTASSIUM    Collection Time: 17  6:11 PM   Result Value Ref Range    Potassium 3.9 3.5 - 5.1 mmol/L   MAGNESIUM    Collection Time: 17  6:11 PM   Result Value Ref Range    Magnesium 1.7 1.6 - 2.4 mg/dL   POC VENOUS BLOOD GAS    Collection Time: 17  8:54 PM   Result Value Ref Range    Device: NASAL CANNULA      Flow rate (POC) 2 L/M    FIO2 (POC) 28 %    pH, venous (POC) 7.456 (H) 7.32 - 7.42      pCO2, venous (POC) 42.2 41 - 51 MMHG    pO2, venous (POC) 25 25 - 40 mmHg    HCO3, venous (POC) 29.7 (H) 23.0 - 28.0 MMOL/L    sO2, venous (POC) 49 (L) 65 - 88 %    Base excess, venous (POC) 6 mmol/L    Allens test (POC) N/A      Total resp.  rate 16      Site [facility]      Specimen type (POC) VENOUS BLOOD GLUCOSE, POC    Collection Time: 01/23/17  9:17 PM   Result Value Ref Range    Glucose (POC) 218 (H) 65 - 100 mg/dL    Performed by Ryder Ratliff    METABOLIC PANEL, COMPREHENSIVE    Collection Time: 01/24/17  3:56 AM   Result Value Ref Range    Sodium 123 (L) 136 - 145 mmol/L    Potassium 4.4 3.5 - 5.1 mmol/L    Chloride 84 (L) 97 - 108 mmol/L    CO2 32 21 - 32 mmol/L    Anion gap 7 5 - 15 mmol/L    Glucose 176 (H) 65 - 100 mg/dL    BUN 28 (H) 6 - 20 MG/DL    Creatinine 1.69 (H) 0.70 - 1.30 MG/DL    BUN/Creatinine ratio 17 12 - 20      GFR est AA 50 (L) >60 ml/min/1.73m2    GFR est non-AA 41 (L) >60 ml/min/1.73m2    Calcium 8.7 8.5 - 10.1 MG/DL    Bilirubin, total 0.9 0.2 - 1.0 MG/DL    ALT 12 12 - 78 U/L    AST 14 (L) 15 - 37 U/L    Alk.  phosphatase 81 45 - 117 U/L    Protein, total 6.3 (L) 6.4 - 8.2 g/dL    Albumin 2.6 (L) 3.5 - 5.0 g/dL    Globulin 3.7 2.0 - 4.0 g/dL    A-G Ratio 0.7 (L) 1.1 - 2.2     MAGNESIUM    Collection Time: 01/24/17  3:56 AM   Result Value Ref Range    Magnesium 2.0 1.6 - 2.4 mg/dL   CBC W/O DIFF    Collection Time: 01/24/17  3:56 AM   Result Value Ref Range    WBC 6.4 4.1 - 11.1 K/uL    RBC 4.76 4.10 - 5.70 M/uL    HGB 11.5 (L) 12.1 - 17.0 g/dL    HCT 35.8 (L) 36.6 - 50.3 %    MCV 75.2 (L) 80.0 - 99.0 FL    MCH 24.2 (L) 26.0 - 34.0 PG    MCHC 32.1 30.0 - 36.5 g/dL    RDW 15.6 (H) 11.5 - 14.5 %    PLATELET 973 613 - 150 K/uL   PRO-BNP    Collection Time: 01/24/17  3:56 AM   Result Value Ref Range    NT pro-BNP 6446 (H) 0 - 125 PG/ML   VANCOMYCIN, TROUGH    Collection Time: 01/24/17  3:56 AM   Result Value Ref Range    Vancomycin,trough 20.2 (HH) 5.0 - 10.0 ug/mL    Reported dose date: NOT PROVIDED      Reported dose time: NOT PROVIDED      Reported dose: NOT PROVIDED UNITS   PTT    Collection Time: 01/24/17  4:31 AM   Result Value Ref Range    aPTT 88.9 (H) 22.1 - 32.5 sec    aPTT, therapeutic range     58.0 - 77.0 SECS   POC VENOUS BLOOD GAS    Collection Time: 01/24/17  5:00 AM Result Value Ref Range    Device: NASAL CANNULA      Flow rate (POC) 2 L/M    FIO2 (POC) 28 %    pH, venous (POC) 7.448 (H) 7.32 - 7.42      pCO2, venous (POC) 38.5 (L) 41 - 51 MMHG    pO2, venous (POC) 31 25 - 40 mmHg    HCO3, venous (POC) 26.7 23.0 - 28.0 MMOL/L    sO2, venous (POC) 62 (L) 65 - 88 %    Base excess, venous (POC) 3 mmol/L    Allens test (POC) N/A      Total resp.  rate 14      Site [facility]      Specimen type (POC) VENOUS BLOOD     GLUCOSE, POC    Collection Time: 01/24/17  8:31 AM   Result Value Ref Range    Glucose (POC) 201 (H) 65 - 100 mg/dL    Performed by Royer Denney, POC    Collection Time: 01/24/17 12:24 PM   Result Value Ref Range    Glucose (POC) 194 (H) 65 - 100 mg/dL    Performed by Manish Cassidy    PTT    Collection Time: 01/24/17  1:19 PM   Result Value Ref Range    aPTT 59.0 (H) 22.1 - 32.5 sec    aPTT, therapeutic range     58.0 - 77.0 SECS   POTASSIUM    Collection Time: 01/24/17  1:19 PM   Result Value Ref Range    Potassium 4.2 3.5 - 5.1 mmol/L   MAGNESIUM    Collection Time: 01/24/17  1:19 PM   Result Value Ref Range    Magnesium 1.9 1.6 - 2.4 mg/dL   SODIUM    Collection Time: 01/24/17  1:19 PM   Result Value Ref Range    Sodium 123 (L) 136 - 145 mmol/L         Medications reviewed:    Current Facility-Administered Medications   Medication Dose Route Frequency    bumetanide (BUMEX) injection 2 mg  2 mg IntraVENous BID    atorvastatin (LIPITOR) tablet 10 mg  10 mg Oral DAILY    insulin glargine (LANTUS) injection 16 Units  16 Units SubCUTAneous DAILY    glimepiride (AMARYL) tablet 2 mg  2 mg Oral ACB    pantoprazole (PROTONIX) tablet 40 mg  40 mg Oral ACB    amiodarone (CORDARONE) 450 mg in dextrose 5% 250 mL infusion  0.5-1 mg/min IntraVENous TITRATE    traZODone (DESYREL) tablet 50 mg  50 mg Oral QHS PRN    senna-docusate (PERICOLACE) 8.6-50 mg per tablet 1 Tab  1 Tab Oral DAILY    ceFAZolin (ANCEF) 1 g in 0.9% sodium chloride (MBP/ADV) 50 mL  1 g IntraVENous Q8H    0.9% sodium chloride infusion  10 mL/hr IntraVENous CONTINUOUS    vasopressin (VASOSTRICT) 20 Units in 0.9% sodium chloride 100 mL infusion  0.01-0.04 Units/min IntraVENous TITRATE    sodium chloride (NS) flush 5-10 mL  5-10 mL InterCATHeter Q8H    sodium chloride (NS) flush 5-10 mL  5-10 mL InterCATHeter PRN    acetaminophen (TYLENOL) tablet 650 mg  650 mg Oral Q6H PRN    aspirin delayed-release tablet 81 mg  81 mg Oral DAILY    glucagon (GLUCAGEN) injection 1 mg  1 mg IntraMUSCular PRN    glucose chewable tablet 16 g  4 Tab Oral PRN    insulin lispro (HUMALOG) injection   SubCUTAneous AC&HS    magnesium oxide (MAG-OX) tablet 400 mg  400 mg Oral DAILY    nicotine (NICODERM CQ) 21 mg/24 hr patch 1 Patch  1 Patch TransDERmal Q24H    0.9% sodium chloride infusion  9 mL/hr IntraVENous CONTINUOUS    sodium chloride (NS) flush 5-10 mL  5-10 mL IntraVENous Q8H    sodium chloride (NS) flush 5-10 mL  5-10 mL IntraVENous PRN    albuterol (PROVENTIL VENTOLIN) nebulizer solution 2.5 mg  2.5 mg Nebulization Q4H PRN    heparin 25,000 units in D5W 250 ml infusion  12-25 Units/kg/hr IntraVENous TITRATE    morphine injection 2 mg  2 mg IntraVENous Q4H PRN    diphenhydrAMINE (BENADRYL) capsule 25 mg  25 mg Oral QHS PRN    oxyCODONE-acetaminophen (PERCOCET) 5-325 mg per tablet 2 Tab  2 Tab Oral Q4H PRN    milrinone (PRIMACOR) 20 MG/100 ML D5W infusion  0.375 mcg/kg/min IntraVENous CONTINUOUS    ELECTROLYTE REPLACEMENT PROTOCOL  1 Each Other PRN           IMPRESSION:   1. Acute on chronic systolic heart failure (ICM) - Stage D, NYHA Class IV  2. Severe native coronary artery disease  3. Diabetes Mellitus, Hga1c 13.5  4. History of tobacco abuse  5. Cellulitis  6. CHACORTA on CKD3  7. Pulmonary HTN  8. Persistent atrial fibrillation  9. Hyponatremia       PLAN:   1. Continue IV milrinone 0.3 mcg/kg/min, change IV bumex to 2 mg bid , Follow I/O, Replete electrolytes, Hold ACE-I due to CHACORTA on CKD.  Hold BB due to hypotension. Tolvaptan  2. Continue ASA, statin, low dose BB; too high risk for CABG d/t low LVEF and diabetes out of control  3. Start lantus and sliding scale insulin, accuchecks, diabetic education  4. Schedule PFTs, carotid ultrasound, ABIs to further assess risk for surgical revascularization  5. Change IV vancomycin and levaquin to IV cefazolin 1 gram q 8 hours  6. Smoking cessation counseling, nicotine patch  7. IV heparin infusion and IV amiodarone infusion           Critical care was necessary to treat or prevent imminent or life threatening deterioration of the following conditions: cardiac failure, respiratory failure, and CNS failure or compromise. Total critical care time spent:  33 minutes. There was no overlap with other services    Services Provided:   1. Telemetry review and 12 lead ECG interpretation   2. Hemodynamic interpretation, assessment and management   3. Review and interpretation of CXR   4. Review and interpretation of lab values   5. Review and interpretation of microbiologic data and culture results   6. Review of medications and administration   7. Review and interpretation of nutrition requirements and management   8. Discussion of management with other consultants and services   9. Clinical update to family members      Thank you for letting me see him with you,    Dorie Grimm MD, [place] [id number] Director  Jennifer Roman [address] [place] [address], [address] [place] [address]  Office: [phone]  Fax: [phone]  24 hour VAD/HF Pager: [phone]

## 2017-01-24 NOTE — CONSULTS
[address]       Name:  Roxanne Myers   MR#:  [id number]   :  1950   Account #:  [de-identified]    Date of Consultation:  2017   Date of Adm:  2017       LOCATION: The patient is in bed 34 of the CVICU. ATTENDING PHYSICIAN: Esme López MD.    CHIEF COMPLAINT: Swelling of both legs with erythema and weeping   of clear fluid. REASON FOR CONSULTATION: Cellulitis of both legs. The   consultation was requested by Dr. Melyssa Benitez. The history is   obtained by interview of the patient and review of the medical records. HISTORY OF PRESENT ILLNESS: This patient is a 81-year-old white   male with a very limited past medical history. Initially, he stated that he   had not seen a physician in 50 years. However, he then recalled that   he had been admitted to [facility] about 3 years ago. He was admitted for   about 3 days and apparently was told that he had congestive heart   failure. He did not undergo cardiac catheterization at that time, but   reportedly had a left ventricular ejection fraction of about 30%. This   patient has been a heavy cigarette smoker. He started smoking when   he was 7years old and smoked 2 to 2.5 packs per day up until the   time of his admission to the hospital. In addition, he used to drink   alcohol heavily. He drank 12 beers a day, but quit 15 to 20 years ago. The patient recalls that he did go to [facility] about 3 years ago because   of some shortness of breath. Recently, over the past \"month or 2\" he   has had intermittent swelling of both legs. He has taken one of his   wife's hydrochlorothiazide pills when his swelling occurs, and that   apparently helps to resolve the swelling. However, about 2 weeks ago,   the patient began to notice a significant increase in the swelling of both   legs.  Both legs developed an associated erythema, and he describes   weeping of clear fluid from multiple sites along both legs up to the hips. It got so bad that he would sit with his feet in a bucket to collect the   drainage. He had no associated fever, chills, sweats, or systemic   symptoms. On the morning of 01/19/2017, the patient went to the emergency room   at [place] [address]. He was admitted to the hospital. An   echocardiogram showed a left ventricular ejection fraction of 5% to   10% with severe diffuse hypokinesis. The patient underwent a cardiac   catheterization on 01/20/2017 that revealed severe diffuse 3-vessel   coronary artery disease and apparently confirmed the dramatically   reduced left ventricular ejection fraction. The patient was transferred to   [facility] on that day. Since arriving at [facility],   the patient has undergone aggressive diuresis, and his output is   currently about 10 liters ahead of his input. In addition, he was started   on vancomycin and Levaquin for possible cellulitis of both legs. On   admission, he was noted to have erythema of both legs, greater on the   right than the left. However, the patient had no fever, chills, sweats, or   systemic symptoms prior to admission, and he had a normal white   blood cell count. Over the past 3 days his legs have improved   dramatically, and much of this improvement is due to the diuresis. The   legs are no longer weeping fluid. He still has fairly prominent erythema   of the right leg greater than the left leg, but it appears somewhat   chronic and scaly instead of an acute cellulitis. We are now asked to   see the patient for possible bilateral lower extremity cellulitis. The   patient has no history of MRSA. PAST MEDICAL HISTORY    TOBACCO: The patient began smoking when he was 7years old and   smoked up to 2 to 2-1/2 packs per day up until the time he was   admitted to [place] [address] on 01/19/2017.      ALCOHOL: He used to drink 12 beers per day, but quit 15 to 20 years ago.    MEDICATIONS PRIOR TO ADMISSION   1. HCTZ that he would occasionally borrow from his wife to treat his   own lower extremity swelling over the past couple of months. 2. Hydrocodone - the patient states that he would purchase   hydrocodone off the street to help manage his chronic low back pain. ALLERGIES: NONE KNOWN. ILLNESSES   1. Organic heart disease - ischemic heart disease, coronary artery   disease, severe congestive heart failure, and pulmonary hypertension   diagnosed this admission. 2. NIDDM - diagnosed this admission, and his hemoglobin A1c was   about 13.4.   3. Probable chronic obstructive pulmonary disease - the patient has   had problems with shortness of breath and attributes this to cigarette   smoking. 4. Probable history of chronic renal failure with acute exacerbation this   admission. SURGERY: None. SOCIAL HISTORY: The patient was a , but he retired 2   years ago, primarily because of his shortness of breath. FAMILY HISTORY: His mother  at age 80 from \"old age\". His   father  of colon cancer at the age of 40. He has one brother who   had an MI in the past, and apparently had pancreatic cancer about 13   to 14 years ago. He had 1 sister who was significantly   mentally retarded and  about 6 months after her mother . Her   mother was her primary caregiver. REVIEW OF SYSTEMS   CONSTITUTIONAL: No fever, chills, sweats. HEENT: No headache or visual change and no sore throat. LYMPHATIC: No history of adenopathy. DERMATOLOGIC: As in HPI. RESPIRATORY: No cough or hemoptysis. CARDIOVASCULAR: No chest pains, but he has had significant   shortness of breath and dyspnea on exertion. GASTROINTESTINAL: No nausea, vomiting, abdominal   pain, diarrhea. GENITOURINARY: No dysuria. MUSCULOSKELETAL: No myalgias or arthralgias. NEUROLOGIC: No history of seizure or stroke.     PHYSICAL EXAMINATION   GENERAL: No acute distress; he looks older than his stated age. VITAL SIGNS: Blood pressure is 114/66, heart rate 96, respiratory rate   20. He is afebrile throughout his hospital stay. Weight 169 pounds. HEENT: Sclerae and conjunctivae benign, EOMI, PERRL. Oropharynx   is unremarkable. NECK: Supple. NODES: No adenopathy. SKIN: No rashes. LUNGS: Basilar rales. CARDIOVASCULAR: Regular rate and rhythm with no murmurs heard. ABDOMEN: Soft, mild tenderness in the right upper quadrant, no   masses are felt, bowel sounds are present. BACK: No CVAT. EXTREMITIES: Lower extremities: The pretibial edema is essentially   resolved. There are multiple  very small punched out ulcers on both   legs, probably from chronic venous insufficiency. There is scaly   somewhat chronic-appearing erythema, greater on the right leg than   the left leg. The areas are not warm and not really tender. I cannot feel   pedal pulses at this time. NEUROLOGIC: Alert and oriented. LABORATORY DATA: CBC reveals a hemoglobin 11.8 with an MCV   of 74.8, MCH 25.0, and MCHC 33.4. Platelet count is normal. White   blood cell count is 6600 and it was 8500 on admission. The chemistry   data shows a BUN of 28 and creatinine 1.60 with essentially normal   liver function tests. Glucose was about 240. Hemoglobin A1c was   about 13.4. BNP was 7086. On admission to the hospital, the BUN was   40, creatinine 2.33. IMPRESSION   1. Bilateral venous stasis disease of the lower extremities and possible   superimposed cellulitis: By description, his legs look much better after   the significant diuresis. He is 10 liters negative since admission. At this   time, I think it is difficult to tell how much of the changes in the lower   extremities are actually due to infection instead of venous stasis   disease. I think it is unlikely that he had a gram-negative cellulitis, and I   also think methicillin resistant Staphylococcus aureus is unlikely.    Therefore, I would like to simplify his antibiotic regimen down to Ancef   alone for now. 2. Organic heart disease - ischemic heart disease, coronary artery   disease, congestive heart failure, and pulmonary hypertension. 3. Chronic renal failure with an acute exacerbation. 4. Non insulin dependent diabetes mellitus - new diagnosis. 5. Probable chronic obstructive pulmonary disease - heavy cigarette   smoking since age. 6. Anemia - hypochromic microcytic. The patient has a positive family   history of colon cancer in his father at the age of 40. He may need a   gastrointestinal evaluation. This patient has never had a colonoscopy. RECOMMENDATIONS   1. Discontinue vancomycin and Levaquin. 2. Begin Ancef 1 gram IV q.8h. Thank you very much for letting us see this patient with you.         MD Chas Kolb / [place]   D:  01/23/2017   22:36   T:  01/24/2017   03:38   Job #:  [id number]

## 2017-01-24 NOTE — PROGRESS NOTES
Bedside and Verbal shift change report given to [facility] [address] (oncoming nurse) by Mary Myers (offgoing nurse). Report included the following information SBAR, Kardex, MAR, Recent Results and Cardiac Rhythm Sinus Rhythm.

## 2017-01-24 NOTE — PROGRESS NOTES
Problem: Self Care Deficits Care Plan (Adult)  Goal: *Acute Goals and Plan of Care (Insert Text)  Occupational Therapy Goals  Initiated 1/23/2017  1. Patient will perform lower body dressing with modified independence within 7 day(s). 2. Patient will perform standing ADLs 10 mins with supervision/set-up within 7 day(s). 3. Patient will perform bathing with supervision/set-up within 7 day(s). 4. Patient will perform PRN BSC / toilet transfers with supervision/set-up within 7 day(s). 5. Patient will perform all aspects of toileting with supervision/set-up within 7 day(s). 6. Patient will participate in upper extremity therapeutic exercise/activities with medium resistance band supervision/set-up for 5 minutes within 7 day(s). OCCUPATIONAL THERAPY TREATMENT  Patient: José Miguel Juárez (64 y.o. male)s   Date: 1/24/2017  Diagnosis: CAD  Systolic heart failure ([address] <principal problem not specified>       Precautions:        ASSESSMENT:  Patient progressing with all areas, standing 5 mins during grooming task min A, bed mobility mod I, and sit<>stand min A hand held A. ADLs limited by active ROM B LEs (edema and pain is decreased today so increased ROM compared to yesterday), standing tolerance, dynamic standing balance, EF 7% and overall endurance. Patient is highly motivated to be independent. Progression toward goals:  [X]       Improving appropriately and progressing toward goals  [ ]       Improving slowly and progressing toward goals  [ ]       Not making progress toward goals and plan of care will be adjusted       PLAN:  Patient continues to benefit from skilled intervention to address the above impairments. Continue treatment per established plan of care. Discharge Recommendations:  Rehab and To Be Determined  Further Equipment Recommendations for Discharge:  TBD       SUBJECTIVE:   Patient stated I will try, this line Sridevi Levy) only goes so far.       OBJECTIVE DATA SUMMARY:   Cognitive/Behavioral Status:  Neurologic State: Alert  Orientation Level: Oriented X4  Cognition: Appropriate decision making; Appropriate for age attention/concentration; Appropriate safety awareness  Perception: Appears intact  Perseveration: No perseveration noted  Safety/Judgement: Awareness of environment     Functional Mobility and Transfers for ADLs:  Bed Mobility:  Supine to Sit: Modified independent  Scooting: Supervision (to EOB)     Transfers:  Sit to Stand: Supervision  Stand to Sit: Supervision  Bed to Chair: Minimum assistance (hand held assist)     Balance:  Sitting: Intact  Standing: Impaired; Without support  Standing - Static: Good  Standing - Dynamic : Fair (one hand on supportive surface)     ADL Intervention:        Grooming  Brushing Teeth: Supervision/set-up (standing at chair 5 mins, dentures; [place] not reach sink)                       Lower Body Dressing Assistance  Socks: Maximum assistance tailor sitting while sitting up in bed           Cognitive Retraining  Safety/Judgement: Awareness of environment     Neuro Re-Education:           Therapeutic Exercises:      Pain:  Pain Scale 1: Numeric (0 - 10)  Pain Intensity 1: 2  Pain Location 1: Leg  Pain Orientation 1: Left;Right  Pain Description 1: Aching  Pain Intervention(s) 1: Declines  Activity Tolerance:   RR 16-28, HR , 98% 2 L NC  Please refer to the flowsheet for vital signs taken during this treatment.   After treatment:   [X] Patient left in no apparent distress sitting up in chair  [ ] Patient left in no apparent distress in bed  [X] Call bell left within reach  [X] Nursing notified  [ ] Caregiver present  [ ] Bed alarm activated      COMMUNICATION/COLLABORATION:   The patients plan of care was discussed with: Physical Therapist and Registered Nurse     Celio Darby  Time Calculation: 24 mins

## 2017-01-24 NOTE — PROGRESS NOTES
Attended IDR in CVICU where patient's care was discussed.     Antwan Simmons  [facility]  ([place] [address] Patient Care Specialist)   Paging Service [phone])

## 2017-01-24 NOTE — PROGRESS NOTES
Na reviewed, remains at 123  Repeat scheduled at 8pm  If still at 123 or less, will dose with tolvaptan again

## 2017-01-24 NOTE — PROGRESS NOTES
Problem: Falls - Risk of  Goal: *Absence of falls  Low fall risk, call bell and belongings within reach, nonskid socks and fall risk band on    Problem: Pressure Ulcer - Risk of  Goal: *Prevention of pressure ulcer  Outcome: Progressing Towards Goal  Turning q2h, monitoring glucose ACHS, on TCS bed, good nutrition.     Problem: Heart Failure: Day 3  Goal: Activity/Safety  Outcome: Progressing Towards Goal  Minimal ambulation d/t swan, ambulates to bedside commode   Goal: Diagnostic Test/Procedures  Outcome: Progressing Towards Goal  Bmp and CBC drawn daily, echo done yesterday  Goal: Nutrition/Diet  Outcome: Progressing Towards Goal  Tolerating low sodium diet, FR changed to 2000  Goal: Medications  Outcome: Progressing Towards Goal  No ACE or ARB d/t renal dysfunction, on milrinon, heparin gtt, stool softener started yesterday       Goal: Respiratory  Outcome: Progressing Towards Goal  On 2L NC, POX >92%

## 2017-01-24 NOTE — PROGRESS NOTES
Infectious Diseases Consultation     Please see dictated note     Impression      1. Bilateral venous stasis disease of the LEs +/- cellulitis: By description, his legs look much better after significant diuresis (10 liters negative since admission) and antibiotics. It is difficult to tell how much of this was due to infection. I would like to simplify his antibiotic regimen. GN cellulitis is unlikely so I think we can stop Levaquin. I also think MRSA is unlikely. I'd like to change him to Ancef alone. 2. OHD -- IHD/CAD; CHF; pulm HTN    3. CRF with acute exacerbation    4. NIDDM -- new diagnosis    5. Probable COPD -- heavy smoking since age 7     8. Anemia -- HC/MC -- positive family history of colon cancer -- may need GI W/U    Recommend:      1. Change to Ancef     Sorry for delay in consult.  I note it was written 1/20 but not called to me til 1/23     Thanks,   Sophie Morales MD  1/23/2017  9:47 PM

## 2017-01-24 NOTE — PROGRESS NOTES
Problem: Mobility Impaired (Adult and Pediatric)  Goal: *Acute Goals and Plan of Care (Insert Text)  Physical Therapy Goals  Initiated 1/21/2017  1. Patient will move from supine to sit and sit to supine in bed with modified independence within 7 day(s). 2. Patient will transfer from bed to chair and chair to bed with modified independence using the least restrictive device within 7 day(s). 3. Patient will perform sit to stand with modified independence within 7 day(s). 4. Patient will ambulate with modified independence for 200 feet with the least restrictive device within 7 day(s). 5. Patient will ascend/descend 4 stairs with 0 handrail(s) with modified independence within 7 day(s). PHYSICAL THERAPY TREATMENT  Patient: Yimi Stanton (64 y.o. male)  Date: 1/24/2017  Diagnosis: CAD  Systolic heart failure ([facility] [address] <principal problem not specified>       Precautions:        ASSESSMENT:  Chart reviewed. Pt deemed appropriate for treatment by nursing staff. Pt continues to progress towards functional physical therapy goals. Pt with Ashkan Fallen so unable to ambulate at this time. Pt c/o of ankle and calf pain. Pt sit to stand, SBA only needing assist with lines. Pt with good standing balance. Chair turned and around for standing exercises. Pt given a handout to perform with supervision of nursing staff to protect the lines. Pt marching, mini squats, hip abduction and attempted toe raises. Pt having too much pain with toe raises to complete. Pt instructed to keep active and limit functional decline while lines are in and patient is limited to bed to chair transfers. Recommend home without need for further services at this time, though disposition may change if patient has a surgery.       Progression toward goals:  [X]    Improving appropriately and progressing toward goals  [ ]    Improving slowly and progressing toward goals  [ ]    Not making progress toward goals and plan of care will be adjusted PLAN:  Patient continues to benefit from skilled intervention to address the above impairments. Continue treatment per established plan of care. Discharge Recommendations:  None  Further Equipment Recommendations for Discharge:  none       SUBJECTIVE:   Patient stated I havent been able to go up on my toes in years.       OBJECTIVE DATA SUMMARY:   Critical Behavior:  Neurologic State: Alert  Orientation Level: Oriented X4  Cognition: Appropriate decision making, Appropriate for age attention/concentration, Appropriate safety awareness, Follows commands     Functional Mobility Training:  Transfers:  Sit to Stand: Stand-by asssistance  Stand to Sit: Supervision  Balance:  Sitting: Intact  Standing: Intact; With support     Pain:  Pain Scale 1: Numeric (0 - 10)  Pain Intensity 1: 2  Pain Location 1: Leg  Pain Orientation 1: Left;Right  Pain Description 1: Aching  Pain Intervention(s) 1: Declines  Activity Tolerance:   Please refer to the flowsheet for vital signs taken during this treatment.   After treatment:   [X]    Patient left in no apparent distress sitting up in chair  [ ]    Patient left in no apparent distress in bed  [X]    Call bell left within reach  [X]    Nursing notified  [ ]    Caregiver present  [ ]    Bed alarm activated      COMMUNICATION/COLLABORATION:   The patients plan of care was discussed with: Registered Nurse     Alfreda Hashimoto PT, DPT   Time Calculation: 15 mins

## 2017-01-25 ENCOUNTER — APPOINTMENT (OUTPATIENT)
Dept: GENERAL RADIOLOGY | Age: 67
DRG: 215 | End: 2017-01-25
Attending: NURSE PRACTITIONER
Payer: SELF-PAY

## 2017-01-25 LAB
ALBUMIN SERPL BCP-MCNC: 2.6 G/DL (ref 3.5–5)
ALBUMIN/GLOB SERPL: 0.7 {RATIO} (ref 1.1–2.2)
ALP SERPL-CCNC: 83 U/L (ref 45–117)
ALT SERPL-CCNC: 10 U/L (ref 12–78)
ANION GAP BLD CALC-SCNC: 9 MMOL/L (ref 5–15)
APTT PPP: 60.2 SEC (ref 22.1–32.5)
APTT PPP: 70 SEC (ref 22.1–32.5)
ARTERIAL PATENCY WRIST A: ABNORMAL
AST SERPL W P-5'-P-CCNC: 11 U/L (ref 15–37)
BASE EXCESS BLD CALC-SCNC: 3 MMOL/L
BDY SITE: ABNORMAL
BILIRUB SERPL-MCNC: 0.6 MG/DL (ref 0.2–1)
BNP SERPL-MCNC: 5308 PG/ML (ref 0–125)
BUN SERPL-MCNC: 30 MG/DL (ref 6–20)
BUN/CREAT SERPL: 16 (ref 12–20)
CALCIUM SERPL-MCNC: 8.5 MG/DL (ref 8.5–10.1)
CHLORIDE SERPL-SCNC: 87 MMOL/L (ref 97–108)
CO2 SERPL-SCNC: 30 MMOL/L (ref 21–32)
CREAT SERPL-MCNC: 1.86 MG/DL (ref 0.7–1.3)
ERYTHROCYTE [DISTWIDTH] IN BLOOD BY AUTOMATED COUNT: 15.7 % (ref 11.5–14.5)
GAS FLOW.O2 O2 DELIVERY SYS: ABNORMAL L/MIN
GAS FLOW.O2 SETTING OXYMISER: 2 L/M
GLOBULIN SER CALC-MCNC: 3.8 G/DL (ref 2–4)
GLUCOSE BLD STRIP.AUTO-MCNC: 100 MG/DL (ref 65–100)
GLUCOSE BLD STRIP.AUTO-MCNC: 132 MG/DL (ref 65–100)
GLUCOSE BLD STRIP.AUTO-MCNC: 276 MG/DL (ref 65–100)
GLUCOSE BLD STRIP.AUTO-MCNC: 280 MG/DL (ref 65–100)
GLUCOSE SERPL-MCNC: 201 MG/DL (ref 65–100)
HCO3 BLD-SCNC: 27 MMOL/L (ref 22–26)
HCT VFR BLD AUTO: 34.9 % (ref 36.6–50.3)
HGB BLD-MCNC: 11.3 G/DL (ref 12.1–17)
MAGNESIUM SERPL-MCNC: 2.2 MG/DL (ref 1.6–2.4)
MCH RBC QN AUTO: 24.3 PG (ref 26–34)
MCHC RBC AUTO-ENTMCNC: 32.4 G/DL (ref 30–36.5)
MCV RBC AUTO: 75.1 FL (ref 80–99)
O2/TOTAL GAS SETTING VFR VENT: 28 %
PCO2 BLD: 41 MMHG (ref 35–45)
PH BLD: 7.43 [PH] (ref 7.35–7.45)
PLATELET # BLD AUTO: 147 K/UL (ref 150–400)
PO2 BLD: 29 MMHG (ref 80–100)
POTASSIUM SERPL-SCNC: 4.1 MMOL/L (ref 3.5–5.1)
PROT SERPL-MCNC: 6.4 G/DL (ref 6.4–8.2)
RBC # BLD AUTO: 4.65 M/UL (ref 4.1–5.7)
SAO2 % BLD: 57 % (ref 92–97)
SERVICE CMNT-IMP: ABNORMAL
SERVICE CMNT-IMP: NORMAL
SODIUM SERPL-SCNC: 122 MMOL/L (ref 136–145)
SODIUM SERPL-SCNC: 123 MMOL/L (ref 136–145)
SODIUM SERPL-SCNC: 126 MMOL/L (ref 136–145)
SPECIMEN TYPE: ABNORMAL
THERAPEUTIC RANGE,PTTT: ABNORMAL SECS (ref 58–77)
THERAPEUTIC RANGE,PTTT: ABNORMAL SECS (ref 58–77)
TOTAL RESP. RATE, ITRR: 15
WBC # BLD AUTO: 6.2 K/UL (ref 4.1–11.1)

## 2017-01-25 PROCEDURE — 77030013797 HC KT TRNSDUC PRSSR EDWD -A

## 2017-01-25 PROCEDURE — 77030020847 HC STATLOK BARD -A

## 2017-01-25 PROCEDURE — 87106 FUNGI IDENTIFICATION YEAST: CPT | Performed by: INTERNAL MEDICINE

## 2017-01-25 PROCEDURE — 87205 SMEAR GRAM STAIN: CPT | Performed by: INTERNAL MEDICINE

## 2017-01-25 PROCEDURE — 74011250636 HC RX REV CODE- 250/636: Performed by: INTERNAL MEDICINE

## 2017-01-25 PROCEDURE — 74011250637 HC RX REV CODE- 250/637: Performed by: THORACIC SURGERY (CARDIOTHORACIC VASCULAR SURGERY)

## 2017-01-25 PROCEDURE — 74011636637 HC RX REV CODE- 636/637: Performed by: NURSE PRACTITIONER

## 2017-01-25 PROCEDURE — 85027 COMPLETE CBC AUTOMATED: CPT | Performed by: NURSE PRACTITIONER

## 2017-01-25 PROCEDURE — 74011250636 HC RX REV CODE- 250/636: Performed by: THORACIC SURGERY (CARDIOTHORACIC VASCULAR SURGERY)

## 2017-01-25 PROCEDURE — 36415 COLL VENOUS BLD VENIPUNCTURE: CPT | Performed by: NURSE PRACTITIONER

## 2017-01-25 PROCEDURE — 77030011256 HC DRSG MEPILEX <16IN NO BORD MOLN -A

## 2017-01-25 PROCEDURE — 74011250637 HC RX REV CODE- 250/637: Performed by: INTERNAL MEDICINE

## 2017-01-25 PROCEDURE — 83735 ASSAY OF MAGNESIUM: CPT | Performed by: NURSE PRACTITIONER

## 2017-01-25 PROCEDURE — 84295 ASSAY OF SERUM SODIUM: CPT | Performed by: INTERNAL MEDICINE

## 2017-01-25 PROCEDURE — 76937 US GUIDE VASCULAR ACCESS: CPT

## 2017-01-25 PROCEDURE — 83880 ASSAY OF NATRIURETIC PEPTIDE: CPT | Performed by: NURSE PRACTITIONER

## 2017-01-25 PROCEDURE — 77030018719 HC DRSG PTCH ANTIMIC J&J -A

## 2017-01-25 PROCEDURE — 85730 THROMBOPLASTIN TIME PARTIAL: CPT | Performed by: NURSE PRACTITIONER

## 2017-01-25 PROCEDURE — 82803 BLOOD GASES ANY COMBINATION: CPT

## 2017-01-25 PROCEDURE — 74011250636 HC RX REV CODE- 250/636: Performed by: NURSE PRACTITIONER

## 2017-01-25 PROCEDURE — 74011000258 HC RX REV CODE- 258: Performed by: INTERNAL MEDICINE

## 2017-01-25 PROCEDURE — 87147 CULTURE TYPE IMMUNOLOGIC: CPT | Performed by: INTERNAL MEDICINE

## 2017-01-25 PROCEDURE — 74011000250 HC RX REV CODE- 250: Performed by: NURSE PRACTITIONER

## 2017-01-25 PROCEDURE — 74011250637 HC RX REV CODE- 250/637: Performed by: NURSE PRACTITIONER

## 2017-01-25 PROCEDURE — C1751 CATH, INF, PER/CENT/MIDLINE: HCPCS

## 2017-01-25 PROCEDURE — 02HV33Z INSERTION OF INFUSION DEVICE INTO SUPERIOR VENA CAVA, PERCUTANEOUS APPROACH: ICD-10-PCS | Performed by: NURSE PRACTITIONER

## 2017-01-25 PROCEDURE — C1894 INTRO/SHEATH, NON-LASER: HCPCS

## 2017-01-25 PROCEDURE — 85730 THROMBOPLASTIN TIME PARTIAL: CPT | Performed by: THORACIC SURGERY (CARDIOTHORACIC VASCULAR SURGERY)

## 2017-01-25 PROCEDURE — 71010 XR CHEST PORT: CPT

## 2017-01-25 PROCEDURE — 65610000003 HC RM ICU SURGICAL

## 2017-01-25 PROCEDURE — 74011000258 HC RX REV CODE- 258: Performed by: NURSE PRACTITIONER

## 2017-01-25 PROCEDURE — 82962 GLUCOSE BLOOD TEST: CPT

## 2017-01-25 PROCEDURE — 80053 COMPREHEN METABOLIC PANEL: CPT | Performed by: NURSE PRACTITIONER

## 2017-01-25 PROCEDURE — 36569 INSJ PICC 5 YR+ W/O IMAGING: CPT | Performed by: NURSE PRACTITIONER

## 2017-01-25 PROCEDURE — 77030020365 HC SOL INJ SOD CL 0.9% 50ML

## 2017-01-25 RX ORDER — BUMETANIDE 0.25 MG/ML
2 INJECTION INTRAMUSCULAR; INTRAVENOUS 3 TIMES DAILY
Status: DISCONTINUED | OUTPATIENT
Start: 2017-01-25 | End: 2017-01-26

## 2017-01-25 RX ORDER — BACITRACIN 500 UNIT/G
1 PACKET (EA) TOPICAL AS NEEDED
Status: DISCONTINUED | OUTPATIENT
Start: 2017-01-25 | End: 2017-02-15

## 2017-01-25 RX ORDER — TOLVAPTAN 15 MG/1
15 TABLET ORAL ONCE
Status: COMPLETED | OUTPATIENT
Start: 2017-01-25 | End: 2017-01-25

## 2017-01-25 RX ADMIN — BUMETANIDE 2 MG: 0.25 INJECTION, SOLUTION INTRAMUSCULAR; INTRAVENOUS at 14:06

## 2017-01-25 RX ADMIN — CEFAZOLIN SODIUM 1 G: 1 INJECTION, POWDER, FOR SOLUTION INTRAMUSCULAR; INTRAVENOUS at 16:10

## 2017-01-25 RX ADMIN — BUMETANIDE 2 MG: 0.25 INJECTION, SOLUTION INTRAMUSCULAR; INTRAVENOUS at 22:00

## 2017-01-25 RX ADMIN — GLIMEPIRIDE 2 MG: 2 TABLET ORAL at 09:45

## 2017-01-25 RX ADMIN — OXYCODONE HYDROCHLORIDE AND ACETAMINOPHEN 2 TABLET: 5; 325 TABLET ORAL at 10:00

## 2017-01-25 RX ADMIN — OXYCODONE HYDROCHLORIDE AND ACETAMINOPHEN 2 TABLET: 5; 325 TABLET ORAL at 04:38

## 2017-01-25 RX ADMIN — BUMETANIDE 2 MG: 0.25 INJECTION, SOLUTION INTRAMUSCULAR; INTRAVENOUS at 09:40

## 2017-01-25 RX ADMIN — DIPHENHYDRAMINE HYDROCHLORIDE 25 MG: 25 CAPSULE ORAL at 00:30

## 2017-01-25 RX ADMIN — HEPARIN SODIUM AND DEXTROSE 23 UNITS/KG/HR: 10000; 5 INJECTION INTRAVENOUS at 04:40

## 2017-01-25 RX ADMIN — TOLVAPTAN 15 MG: 15 TABLET ORAL at 18:17

## 2017-01-25 RX ADMIN — CEFAZOLIN SODIUM 1 G: 1 INJECTION, POWDER, FOR SOLUTION INTRAMUSCULAR; INTRAVENOUS at 23:03

## 2017-01-25 RX ADMIN — Medication 2 MG: at 05:14

## 2017-01-25 RX ADMIN — OXYCODONE HYDROCHLORIDE AND ACETAMINOPHEN 2 TABLET: 5; 325 TABLET ORAL at 14:06

## 2017-01-25 RX ADMIN — ATORVASTATIN CALCIUM 10 MG: 10 TABLET, FILM COATED ORAL at 09:41

## 2017-01-25 RX ADMIN — Medication 400 MG: at 09:40

## 2017-01-25 RX ADMIN — SODIUM CHLORIDE 9 ML/HR: 900 INJECTION, SOLUTION INTRAVENOUS at 04:41

## 2017-01-25 RX ADMIN — Medication 10 ML: at 09:46

## 2017-01-25 RX ADMIN — AMIODARONE HYDROCHLORIDE 0.5 MG/MIN: 50 INJECTION, SOLUTION INTRAVENOUS at 14:06

## 2017-01-25 RX ADMIN — PANTOPRAZOLE SODIUM 40 MG: 40 TABLET, DELAYED RELEASE ORAL at 09:45

## 2017-01-25 RX ADMIN — OXYCODONE HYDROCHLORIDE AND ACETAMINOPHEN 2 TABLET: 5; 325 TABLET ORAL at 20:29

## 2017-01-25 RX ADMIN — SODIUM CHLORIDE 10 ML/HR: 900 INJECTION, SOLUTION INTRAVENOUS at 04:40

## 2017-01-25 RX ADMIN — Medication 81 MG: at 09:41

## 2017-01-25 RX ADMIN — DOCUSATE SODIUM AND SENNOSIDES 1 TABLET: 8.6; 5 TABLET, FILM COATED ORAL at 09:40

## 2017-01-25 RX ADMIN — INSULIN GLARGINE 16 UNITS: 100 INJECTION, SOLUTION SUBCUTANEOUS at 09:40

## 2017-01-25 RX ADMIN — MILRINONE LACTATE 0.38 MCG/KG/MIN: 200 INJECTION, SOLUTION INTRAVENOUS at 02:13

## 2017-01-25 RX ADMIN — MILRINONE LACTATE 0.38 MCG/KG/MIN: 200 INJECTION, SOLUTION INTRAVENOUS at 18:00

## 2017-01-25 RX ADMIN — Medication 10 ML: at 18:18

## 2017-01-25 RX ADMIN — Medication 10 ML: at 22:00

## 2017-01-25 RX ADMIN — CEFAZOLIN SODIUM 1 G: 1 INJECTION, POWDER, FOR SOLUTION INTRAMUSCULAR; INTRAVENOUS at 08:06

## 2017-01-25 RX ADMIN — INSULIN LISPRO 3 UNITS: 100 INJECTION, SOLUTION INTRAVENOUS; SUBCUTANEOUS at 10:10

## 2017-01-25 RX ADMIN — Medication 2 MG: at 16:10

## 2017-01-25 RX ADMIN — AMIODARONE HYDROCHLORIDE 1 MG/MIN: 50 INJECTION, SOLUTION INTRAVENOUS at 02:50

## 2017-01-25 RX ADMIN — INSULIN LISPRO 3 UNITS: 100 INJECTION, SOLUTION INTRAVENOUS; SUBCUTANEOUS at 12:20

## 2017-01-25 NOTE — PROGRESS NOTES
Bedside and Verbal shift change report given to [address] [place] [address] [address] (oncoming nurse) by Yonas Lorenzo (offgoing nurse). Report included the following information SBAR, Kardex, MAR, Recent Results and Cardiac Rhythm Normal Sinus Rhythm.

## 2017-01-25 NOTE — PROGRESS NOTES
[facility]  Progress Note      Date: January 25, 2017     Admit Date: 1/20/2017    Mr. Dustin Velazquez is a 61year old male patient admitted who was admitted to [facility] on 1/20 and transferred to [facility] on 1/23 for further evaluation of severe CAD and systolic heart failure. Subjective:     Seen with Dr. Ramya Brennan, Dr. Jigar Ortiz, and Dr. Kevin Morales. Last 24 hours:   Remains in afib on amio gtt  Na+ 123 -> 126 on tolvaptan  Bumex gtt d/c'd -> BID dosing  Cr 1.86    This AM:   Up in chair, feels better every day. Current gtts: Milrinone 0.375 mcg/kg/min, amiodarone 0.5 mg/min, MIV      Objective:     Visit Vitals    /65    Pulse 94    Temp 97.9 °F (36.6 °C)    Resp 21    Ht 5' 9.5\" (1.765 m)    Wt 177 lb 0.5 oz (80.3 kg)    SpO2 92%    BMI 25.77 kg/m2        Oxygen Therapy:  Oxygen Therapy  O2 Sat (%): 92 % (01/25/17 1100)  Pulse via Oximetry: 94 beats per minute (01/25/17 1100)  O2 Device: Nasal cannula (01/25/17 1200)  O2 Flow Rate (L/min): 2 l/min (01/25/17 1200)    CXR: persistent LLL opacification     Admission Weight: Last Weight   Weight: 178 lb 2.1 oz (80.8 kg) Weight: 177 lb 0.5 oz (80.3 kg)       Intake / Output / Drain:  Last 24 hrs.:     Intake/Output Summary (Last 24 hours) at 01/25/17 1452  Last data filed at 01/25/17 1200   Gross per 24 hour   Intake          2143.33 ml   Output             1890 ml   Net           253.33 ml     Total: (-) 11L since admission    EXAM:  Neuro: A&O  Resp: bibasilar crackles - better  CV: tachy 100's, afib. S1 S2.    GI: +BS Soft NT/ND  : voiding  Ext: erythematous BLE with weeping bullae covered w/dressings. Slightly worse from yesterday. +2 pitting BLE.      Recent Results (from the past 24 hour(s))   GLUCOSE, POC    Collection Time: 01/24/17  5:54 PM   Result Value Ref Range    Glucose (POC) 196 (H) 65 - 100 mg/dL    Performed by Teressa Alvarado    PTT    Collection Time: 01/24/17  7:51 PM   Result Value Ref Range    aPTT 51.7 (H) 22.1 - 32.5 sec    aPTT, therapeutic range     58.0 - 77.0 SECS   SODIUM    Collection Time: 01/24/17  7:51 PM   Result Value Ref Range    Sodium 125 (L) 136 - 145 mmol/L   GLUCOSE, POC    Collection Time: 01/24/17 10:03 PM   Result Value Ref Range    Glucose (POC) 182 (H) 65 - 100 mg/dL    Performed by Dex Ortiz    POC G3 - PUL    Collection Time: 01/25/17  4:20 AM   Result Value Ref Range    FIO2 (POC) 28 %    pH (POC) 7.426 7.35 - 7.45      pCO2 (POC) 41.0 35.0 - 45.0 MMHG    pO2 (POC) 29 (LL) 80 - 100 MMHG    HCO3 (POC) 27.0 (H) 22 - 26 MMOL/L    sO2 (POC) 57 (L) 92 - 97 %    Base excess (POC) 3 mmol/L    Site SWAN TAVIA      Device: NASAL CANNULA      Flow rate (POC) 2.0 L/M    Allens test (POC) N/A      Specimen type (POC) ARTERIAL      Total resp. rate 15     METABOLIC PANEL, COMPREHENSIVE    Collection Time: 01/25/17  4:34 AM   Result Value Ref Range    Sodium 126 (L) 136 - 145 mmol/L    Potassium 4.1 3.5 - 5.1 mmol/L    Chloride 87 (L) 97 - 108 mmol/L    CO2 30 21 - 32 mmol/L    Anion gap 9 5 - 15 mmol/L    Glucose 201 (H) 65 - 100 mg/dL    BUN 30 (H) 6 - 20 MG/DL    Creatinine 1.86 (H) 0.70 - 1.30 MG/DL    BUN/Creatinine ratio 16 12 - 20      GFR est AA 45 (L) >60 ml/min/1.73m2    GFR est non-AA 37 (L) >60 ml/min/1.73m2    Calcium 8.5 8.5 - 10.1 MG/DL    Bilirubin, total 0.6 0.2 - 1.0 MG/DL    ALT 10 (L) 12 - 78 U/L    AST 11 (L) 15 - 37 U/L    Alk.  phosphatase 83 45 - 117 U/L    Protein, total 6.4 6.4 - 8.2 g/dL    Albumin 2.6 (L) 3.5 - 5.0 g/dL    Globulin 3.8 2.0 - 4.0 g/dL    A-G Ratio 0.7 (L) 1.1 - 2.2     MAGNESIUM    Collection Time: 01/25/17  4:34 AM   Result Value Ref Range    Magnesium 2.2 1.6 - 2.4 mg/dL   CBC W/O DIFF    Collection Time: 01/25/17  4:34 AM   Result Value Ref Range    WBC 6.2 4.1 - 11.1 K/uL    RBC 4.65 4.10 - 5.70 M/uL    HGB 11.3 (L) 12.1 - 17.0 g/dL    HCT 34.9 (L) 36.6 - 50.3 %    MCV 75.1 (L) 80.0 - 99.0 FL    MCH 24.3 (L) 26.0 - 34.0 PG    MCHC 32.4 30.0 - 36.5 g/dL    RDW 15.7 (H) 11.5 - 14.5 %    PLATELET 431 (L) 889 - 400 K/uL   PTT    Collection Time: 01/25/17  4:34 AM   Result Value Ref Range    aPTT 70.0 (H) 22.1 - 32.5 sec    aPTT, therapeutic range     58.0 - 77.0 SECS   PRO-BNP    Collection Time: 01/25/17  4:34 AM   Result Value Ref Range    NT pro-BNP 5308 (H) 0 - 125 PG/ML   GLUCOSE, POC    Collection Time: 01/25/17  8:32 AM   Result Value Ref Range    Glucose (POC) 280 (H) 65 - 100 mg/dL    Performed by Kenton Prieto    PTT    Collection Time: 01/25/17 10:18 AM   Result Value Ref Range    aPTT 60.2 (H) 22.1 - 32.5 sec    aPTT, therapeutic range     58.0 - 77.0 SECS   GLUCOSE, POC    Collection Time: 01/25/17 12:13 PM   Result Value Ref Range    Glucose (POC) 276 (H) 65 - 100 mg/dL    Performed by Shahzad Yanes    SODIUM    Collection Time: 01/25/17  2:10 PM   Result Value Ref Range    Sodium 122 (L) 136 - 145 mmol/L     Current Facility-Administered Medications   Medication Dose Route Frequency    bumetanide (BUMEX) injection 2 mg  2 mg IntraVENous TID    atorvastatin (LIPITOR) tablet 10 mg  10 mg Oral DAILY    insulin glargine (LANTUS) injection 16 Units  16 Units SubCUTAneous DAILY    glimepiride (AMARYL) tablet 2 mg  2 mg Oral ACB    pantoprazole (PROTONIX) tablet 40 mg  40 mg Oral ACB    ondansetron (ZOFRAN) injection 4 mg  4 mg IntraVENous Q6H PRN    amiodarone (CORDARONE) 450 mg in dextrose 5% 250 mL infusion  0.5-1 mg/min IntraVENous TITRATE    traZODone (DESYREL) tablet 50 mg  50 mg Oral QHS PRN    senna-docusate (PERICOLACE) 8.6-50 mg per tablet 1 Tab  1 Tab Oral DAILY    ceFAZolin (ANCEF) 1 g in 0.9% sodium chloride (MBP/ADV) 50 mL  1 g IntraVENous Q8H    0.9% sodium chloride infusion  10 mL/hr IntraVENous CONTINUOUS    vasopressin (VASOSTRICT) 20 Units in 0.9% sodium chloride 100 mL infusion  0.01-0.04 Units/min IntraVENous TITRATE    sodium chloride (NS) flush 5-10 mL  5-10 mL InterCATHeter Q8H    sodium chloride (NS) flush 5-10 mL  5-10 mL InterCATHeter PRN    acetaminophen (TYLENOL) tablet 650 mg  650 mg Oral Q6H PRN    aspirin delayed-release tablet 81 mg  81 mg Oral DAILY    glucagon (GLUCAGEN) injection 1 mg  1 mg IntraMUSCular PRN    glucose chewable tablet 16 g  4 Tab Oral PRN    insulin lispro (HUMALOG) injection   SubCUTAneous AC&HS    magnesium oxide (MAG-OX) tablet 400 mg  400 mg Oral DAILY    nicotine (NICODERM CQ) 21 mg/24 hr patch 1 Patch  1 Patch TransDERmal Q24H    0.9% sodium chloride infusion  9 mL/hr IntraVENous CONTINUOUS    sodium chloride (NS) flush 5-10 mL  5-10 mL IntraVENous Q8H    sodium chloride (NS) flush 5-10 mL  5-10 mL IntraVENous PRN    albuterol (PROVENTIL VENTOLIN) nebulizer solution 2.5 mg  2.5 mg Nebulization Q4H PRN    heparin 25,000 units in D5W 250 ml infusion  12-25 Units/kg/hr IntraVENous TITRATE    morphine injection 2 mg  2 mg IntraVENous Q4H PRN    diphenhydrAMINE (BENADRYL) capsule 25 mg  25 mg Oral QHS PRN    oxyCODONE-acetaminophen (PERCOCET) 5-325 mg per tablet 2 Tab  2 Tab Oral Q4H PRN    milrinone (PRIMACOR) 20 MG/100 ML D5W infusion  0.375 mcg/kg/min IntraVENous CONTINUOUS    ELECTROLYTE REPLACEMENT PROTOCOL  1 Each Other PRN       ASSESSMENT/PLAN:    ICM, Stage D, NYHA Class IV, EF 7%: Continue IV milrinone at 0.375 mcg/kg/min. Increase bumex to - 2 mg TID. No BB d/t hypotension and no ACE-1/ARB d/t hypotension. NT pro-BNP ~5000. Repeat TTE 1/23/17 shows EF 7% with severe diffuse HK, moderate to severe dilation of RV, biatrial enlargement, mild MR, trivial TR, and large left pleural effusion. Continue daily weights, strict I/O, Na+ restricted diet. HF education. Pulmonary hypertension: Continue milrinone. Hyponatremia: Na+ up to 126 after Tolvaptan 15 mg x 1 yesterday. Further dosing per renal. Monitor closely for neurological changes. CAD with LM disease: Prohibitive surgical risk d/t low EF. Continue ASA, heparin gtt, statin. No BB d/t hypotension.   Consider cardiac MRI ifEF > 20% - if viability found, will need PFTs, carotids, and ABIs. Smoking cessation counseling in progress. Atrial fibrillation: Continue amiodarone and heparin gtts. DM Type II: A1C 13.5. DTC consulted - increased Lantus to 16 units and added Amaryl 2 mg ACB per recommendations - appreciate input. Continue AccuChecks AC/HS, continue SSI. Consistent carb diet, continue diabetic education. Tobacco abuse: Nicotine patch. Continue smoking cessation education. Lower extremity venous stasis disease vs. Cellulitis: Improving on antibiotics. Continue cefazolin 1g q8h per ID - appreciate input. Keep weeping areas clean, dry, and covered. Ppx: Continue pantoprazole for SUP, heparin gtt covers DVT ppx. Place PICC, pull PAC.        Dispo: Remain in CVICU today.           Signed By: Sole Huang NP    Saw patient, agree with above  Risk of morbidity and mortality - high  Medical decision making - high complexity

## 2017-01-25 NOTE — PROGRESS NOTES
Na down to 122 this afternoon.   Will give tolvaptan 15mg now  Repeat Na at 8pm  May need daily tolvaptan for a few days

## 2017-01-25 NOTE — PROGRESS NOTES
Infectious Diseases Progress Note    Antibiotic Summary:  Levaquin  --   Vancomycin  --   Ancef    -- present      Subjective:     Feels better. Objective:     Vitals:   Visit Vitals    /67 (BP 1 Location: Left leg, BP Patient Position: At rest)    Pulse 93    Temp 97 °F (36.1 °C)    Resp 18    Ht 5' 9.5\" (1.765 m)    Wt 78.3 kg (172 lb 9.9 oz)    SpO2 97%    BMI 25.13 kg/m2        Tmax:  Temp (24hrs), Av.5 °F (36.4 °C), Min:97 °F (36.1 °C), Max:97.8 °F (36.6 °C)      Exam:  General appearance: alert, no distress  Lungs: clear to auscultation bilaterally  Heart: regular rate and rhythm  Abdomen: soft, non-tender. Bowel sounds normal. No masses,  no organomegaly  Extremities: both legs look better    IV Lines: RIJ inserted     Labs:    Recent Labs      17   0356  17   1209  17   0546  17   0401   WBC  6.4   --   6.6  8.5   HGB  11.5*   --   11.8*  11.9*   PLT  156   --   174  205   BUN  28*  28*  27*  35*   CREA  1.69*  1.60*  1.63*  1.52*   TBILI  0.9   --   0.8  1.0   SGOT  14*   --   14*  10*   AP  81   --   87  83       Assessment:     1. Bilateral venous stasis disease of the LEs +/- cellulitis: By description, his legs look much better after significant diuresis (10 liters negative since admission) and antibiotics. It is difficult to tell how much of this was due to infection. I would like to simplify his antibiotic regimen. I'd  changed him to Ancef alone.     2. OHD -- IHD/CAD; CHF; pulm HTN     3. CRF with acute exacerbation     4. NIDDM -- new diagnosis     5. Probable COPD -- heavy smoking since age 7      10. Anemia -- HC/MC -- positive family history of colon cancer -- may need GI W/U    Plan:     1.  Jess Rodriguez MD

## 2017-01-25 NOTE — PROGRESS NOTES
0800 - Report received from Baldev Stovall RN  1000 - JAN Peacock NP and Dr. Katya Martin at bedside, discussed labs, hemodynamics, I/O, plan to continue to diurese and place PICC and transduce CVP through PICC, DC [place]/MAC and art line when PICC in.  1200 - Gordillo removed at 0545 this morning, pt now able to void clear yellow urine in urinal.  1600 - Discussed pt with Dr. Mari aE Neville including sodium level. Order received to place a PICC, order for Samsca received and ok given for PICC line insertion today. 1615 - IV team here to place PICC. Pt requests morphine for leg pain and anticipated discomfort with PICC insertion. 1745 - PICC insertion complete. Sapiens technique used so line approved for immediate use per standard PICC orders through Dr Parmjit Cisse. Pt ready to sit up for dinner. [address] per protocol. Obturator cap placed. CVP transduced via PICC line. 2000 - Bedside and Verbal shift change report given to  Nicole Jones RN (oncoming nurse) by Anahy Torres RN (offgoing nurse). Report included the following information SBAR, Intake/Output, Recent Results and Cardiac Rhythm NSR.

## 2017-01-25 NOTE — PROGRESS NOTES
Attended IDR in CVICU where patient was discussed.     Antwan Simmons  [facility]  ([place] [phone] Patient Care Specialist)   Paging Service 6-Critical access [phone])

## 2017-01-25 NOTE — PROGRESS NOTES
[facility]   [id number] [address], [facility], [facility]  Phone: [phone]   [phone]       Nephrology Progress Note  Ramila Rivera.     1950     [id number]  Date of Admission : 1/20/2017 01/25/17    CC: Follow up for CKD/ARF      Assessment and Plan   CHACORTA on CKD :  - CHACORTA likely 2/2 cardiorenal syndrome  - Cr essentially stable since starting milrinone  - cont current diuretics for now  - if Cr rising tomorrow, will reduce dose    Hyponatremia :  - likely has chronic hyponatremia from CHF + alcoholism  - his PNA can be playing a role as well  - cortisol, TSH ok, urine osms 322 - c/w some excess ADH production  - repeat Na at noon, if not improving, will dose again with tolvaptan    Acute on Chronic Systolic CHF w/ severe CMP  - echo with EF 5-10%, severely dilated LV  - Multivessel CAD : being evaluated for CABG   - On Milrinone     Cellulitis RLE w/ Pustular lesions     Pulm HTN     Chronic smoker w/VENANCIO -PNA     Interval History:  Seen and examined. Feeling better this AM.  Cr stable. UOP stable. Na better at 126 this AM.  Received tolvaptan x 1 yesterday AM.    Review of Systems: Pertinent items are noted in HPI.     Current Medications:   Current Facility-Administered Medications   Medication Dose Route Frequency    bumetanide (BUMEX) injection 2 mg  2 mg IntraVENous BID    atorvastatin (LIPITOR) tablet 10 mg  10 mg Oral DAILY    insulin glargine (LANTUS) injection 16 Units  16 Units SubCUTAneous DAILY    glimepiride (AMARYL) tablet 2 mg  2 mg Oral ACB    pantoprazole (PROTONIX) tablet 40 mg  40 mg Oral ACB    ondansetron (ZOFRAN) injection 4 mg  4 mg IntraVENous Q6H PRN    amiodarone (CORDARONE) 450 mg in dextrose 5% 250 mL infusion  0.5-1 mg/min IntraVENous TITRATE    traZODone (DESYREL) tablet 50 mg  50 mg Oral QHS PRN    senna-docusate (PERICOLACE) 8.6-50 mg per tablet 1 Tab  1 Tab Oral DAILY    ceFAZolin (ANCEF) 1 g in 0.9% sodium chloride (MBP/ADV) 50 mL  1 g IntraVENous Q8H    0.9% sodium chloride infusion  10 mL/hr IntraVENous CONTINUOUS    vasopressin (VASOSTRICT) 20 Units in 0.9% sodium chloride 100 mL infusion  0.01-0.04 Units/min IntraVENous TITRATE    sodium chloride (NS) flush 5-10 mL  5-10 mL InterCATHeter Q8H    sodium chloride (NS) flush 5-10 mL  5-10 mL InterCATHeter PRN    acetaminophen (TYLENOL) tablet 650 mg  650 mg Oral Q6H PRN    aspirin delayed-release tablet 81 mg  81 mg Oral DAILY    glucagon (GLUCAGEN) injection 1 mg  1 mg IntraMUSCular PRN    glucose chewable tablet 16 g  4 Tab Oral PRN    insulin lispro (HUMALOG) injection   SubCUTAneous AC&HS    magnesium oxide (MAG-OX) tablet 400 mg  400 mg Oral DAILY    nicotine (NICODERM CQ) 21 mg/24 hr patch 1 Patch  1 Patch TransDERmal Q24H    0.9% sodium chloride infusion  9 mL/hr IntraVENous CONTINUOUS    sodium chloride (NS) flush 5-10 mL  5-10 mL IntraVENous Q8H    sodium chloride (NS) flush 5-10 mL  5-10 mL IntraVENous PRN    albuterol (PROVENTIL VENTOLIN) nebulizer solution 2.5 mg  2.5 mg Nebulization Q4H PRN    heparin 25,000 units in D5W 250 ml infusion  12-25 Units/kg/hr IntraVENous TITRATE    morphine injection 2 mg  2 mg IntraVENous Q4H PRN    diphenhydrAMINE (BENADRYL) capsule 25 mg  25 mg Oral QHS PRN    oxyCODONE-acetaminophen (PERCOCET) 5-325 mg per tablet 2 Tab  2 Tab Oral Q4H PRN    milrinone (PRIMACOR) 20 MG/100 ML D5W infusion  0.375 mcg/kg/min IntraVENous CONTINUOUS    ELECTROLYTE REPLACEMENT PROTOCOL  1 Each Other PRN      No Known Allergies    Objective:  Vitals:    Vitals:    01/25/17 0100 01/25/17 0200 01/25/17 0300 01/25/17 0727   BP:       Pulse: 84 93 93    Resp: 17 25 28    Temp:       SpO2: 96% 97% 95%    Weight:    80.3 kg (177 lb 0.5 oz)   Height:         Intake and Output:     01/23 1901 - 01/25 0700  In: 3358.1 [P.O.:240;  I.V.:3118.1]  Out: 4500 [Urine:4500]    Physical Examination:  General: Awake, alert  Neck:  JVD +  Resp:  diminished on L side CV:  RRR,  no murmur or rub,+ LE edema  GI:  Soft, NT, + Bowel sounds, no hepatosplenomegaly  Neurologic:  Non focal  Skin:  RLE cellulitis w/ pustules   :  No mendoza    []    High complexity decision making was performed  []    Patient is at high-risk of decompensation with multiple organ involvement    Lab Data Personally Reviewed: I have reviewed all the pertinent labs, microbiology data and radiology studies during assessment.     Recent Labs      01/25/17 0434 01/24/17 1951 01/24/17   1319  01/24/17   0356  01/23/17   1811  01/23/17   1209  01/23/17   0546   NA  126*  125*  123*  123*   --   124*  125*   K  4.1   --   4.2  4.4  3.9  4.1  3.9   CL  87*   --    --   84*   --   84*  84*   CO2  30   --    --   32   --   33*  35*   GLU  201*   --    --   176*   --   214*  244*   BUN  30*   --    --   28*   --   28*  27*   CREA  1.86*   --    --   1.69*   --   1.60*  1.63*   CA  8.5   --    --   8.7   --   8.5  8.4*   MG  2.2   --   1.9  2.0  1.7  2.1  2.1   PHOS   --    --    --    --    --   2.5*   --    ALB  2.6*   --    --   2.6*   --   2.8*  2.6*   SGOT  11*   --    --   14*   --    --   14*   ALT  10*   --    --   12   --    --   9*   INR   --    --    --    --    --   1.3*   --      Recent Labs      01/25/17 0434 01/24/17 0356 01/23/17   0546   WBC  6.2  6.4  6.6   HGB  11.3*  11.5*  11.8*   HCT  34.9*  35.8*  35.3*   PLT  147*  156  174     No results found for: SDES  Lab Results   Component Value Date/Time    Culture result: NO SIGNIFICANT GROWTH 01/19/2017 01:35 AM     Recent Results (from the past 24 hour(s))   GLUCOSE, POC    Collection Time: 01/24/17 12:24 PM   Result Value Ref Range    Glucose (POC) 194 (H) 65 - 100 mg/dL    Performed by [facility]    PTT    Collection Time: 01/24/17  1:19 PM   Result Value Ref Range    aPTT 59.0 (H) 22.1 - 32.5 sec    aPTT, therapeutic range     58.0 - 77.0 SECS   POTASSIUM    Collection Time: 01/24/17  1:19 PM   Result Value Ref Range    Potassium 4.2 3.5 - 5.1 mmol/L   MAGNESIUM    Collection Time: 01/24/17  1:19 PM   Result Value Ref Range    Magnesium 1.9 1.6 - 2.4 mg/dL   SODIUM    Collection Time: 01/24/17  1:19 PM   Result Value Ref Range    Sodium 123 (L) 136 - 145 mmol/L   GLUCOSE, POC    Collection Time: 01/24/17  5:54 PM   Result Value Ref Range    Glucose (POC) 196 (H) 65 - 100 mg/dL    Performed by Koko Pollack    PTT    Collection Time: 01/24/17  7:51 PM   Result Value Ref Range    aPTT 51.7 (H) 22.1 - 32.5 sec    aPTT, therapeutic range     58.0 - 77.0 SECS   SODIUM    Collection Time: 01/24/17  7:51 PM   Result Value Ref Range    Sodium 125 (L) 136 - 145 mmol/L   GLUCOSE, POC    Collection Time: 01/24/17 10:03 PM   Result Value Ref Range    Glucose (POC) 182 (H) 65 - 100 mg/dL    Performed by Paris Gordon    POC G3 - PUL    Collection Time: 01/25/17  4:20 AM   Result Value Ref Range    FIO2 (POC) 28 %    pH (POC) 7.426 7.35 - 7.45      pCO2 (POC) 41.0 35.0 - 45.0 MMHG    pO2 (POC) 29 (LL) 80 - 100 MMHG    HCO3 (POC) 27.0 (H) 22 - 26 MMOL/L    sO2 (POC) 57 (L) 92 - 97 %    Base excess (POC) 3 mmol/L    Site SWAN TAVIA      Device: NASAL CANNULA      Flow rate (POC) 2.0 L/M    Allens test (POC) N/A      Specimen type (POC) ARTERIAL      Total resp. rate 15     METABOLIC PANEL, COMPREHENSIVE    Collection Time: 01/25/17  4:34 AM   Result Value Ref Range    Sodium 126 (L) 136 - 145 mmol/L    Potassium 4.1 3.5 - 5.1 mmol/L    Chloride 87 (L) 97 - 108 mmol/L    CO2 30 21 - 32 mmol/L    Anion gap 9 5 - 15 mmol/L    Glucose 201 (H) 65 - 100 mg/dL    BUN 30 (H) 6 - 20 MG/DL    Creatinine 1.86 (H) 0.70 - 1.30 MG/DL    BUN/Creatinine ratio 16 12 - 20      GFR est AA 45 (L) >60 ml/min/1.73m2    GFR est non-AA 37 (L) >60 ml/min/1.73m2    Calcium 8.5 8.5 - 10.1 MG/DL    Bilirubin, total 0.6 0.2 - 1.0 MG/DL    ALT 10 (L) 12 - 78 U/L    AST 11 (L) 15 - 37 U/L    Alk.  phosphatase 83 45 - 117 U/L    Protein, total 6.4 6.4 - 8.2 g/dL    Albumin 2.6 (L) 3.5 - 5.0 g/dL    Globulin 3.8 2.0 - 4.0 g/dL    A-G Ratio 0.7 (L) 1.1 - 2.2     MAGNESIUM    Collection Time: 01/25/17  4:34 AM   Result Value Ref Range    Magnesium 2.2 1.6 - 2.4 mg/dL   CBC W/O DIFF    Collection Time: 01/25/17  4:34 AM   Result Value Ref Range    WBC 6.2 4.1 - 11.1 K/uL    RBC 4.65 4.10 - 5.70 M/uL    HGB 11.3 (L) 12.1 - 17.0 g/dL    HCT 34.9 (L) 36.6 - 50.3 %    MCV 75.1 (L) 80.0 - 99.0 FL    MCH 24.3 (L) 26.0 - 34.0 PG    MCHC 32.4 30.0 - 36.5 g/dL    RDW 15.7 (H) 11.5 - 14.5 %    PLATELET 100 (L) 595 - 400 K/uL   PTT    Collection Time: 01/25/17  4:34 AM   Result Value Ref Range    aPTT 70.0 (H) 22.1 - 32.5 sec    aPTT, therapeutic range     58.0 - 77.0 SECS   PRO-BNP    Collection Time: 01/25/17  4:34 AM   Result Value Ref Range    NT pro-BNP 5308 (H) 0 - 125 PG/ML   GLUCOSE, POC    Collection Time: 01/25/17  8:32 AM   Result Value Ref Range    Glucose (POC) 280 (H) 65 - 100 mg/dL    Performed by Boris Medellin            I have reviewed the flowsheets. Chart and Pertinent Notes have been reviewed. No change in PMH ,family and social history from Consult note.       Abimael Taylor MD

## 2017-01-25 NOTE — PROCEDURES
PICC Placement Note    PRE-PROCEDURE VERIFICATION  Correct Procedure: yes  Correct Site:  yes  Temperature: Temp: 97.9 °F (36.6 °C), Temperature Source: Temp Source: Pulmonary Artery  Recent Labs      01/25/17   0434   01/23/17   1209   BUN  30*   < >  28*   CREA  1.86*   < >  1.60*   PLT  147*   < >   --    INR   --    --   1.3*   WBC  6.2   < >   --     < > = values in this interval not displayed. Allergies: Review of patient's allergies indicates no known allergies. Education materials, including PICC Booklet, for PICC Care given to patient: yes. See Patient Education activity for further details. PROCEDURE DETAIL  A triple lumen PICC line was started for vascular access. The following documentation is in addition to the PICC properties in the lines/airways flowsheet :  Lot #: [id number]  Was xylocaine 1% used intradermally:  yes  Catheter Length: 44 (cm)  Vein Selection for PICC:left brachial  Central Line Bundle followed yes  Complication Related to Insertion: accessed right basilic vein but difficulty in placing dilator/ case aborted. Cephalic and brachial vein too small. Arm cirm 28 cm. Left brachial accessed successfully. The placement was verified by ECG/Sapiens technology: The  tip location is on the left side and the tip is in the  superior vena cava. See ECG results for PICC tip placement. Report given to nurse Radha Chandler. Line is okay to use.     Juan Mohs, RN

## 2017-01-25 NOTE — DIABETES MGMT
DTC Progress Note    Recommendations/ Comments:   Lantus dose increased yesterday from 10 units to 16 units in the morning and Amaryl was added. Noted BS today > 270 mg/dl today. Pt received 6 units of correction insulin in the past 24 hours. If appropriate, please consider:    - Changing correction insulin to normal sensitivity scale. -  Increase Lantus to 24 units    DTC will continue to follow    Chart reviewed on Fernanda Melara .    Patient is a 61 y.o. male with newly diagnosed Type 2 Diabetes. A1c:   Lab Results   Component Value Date/Time    Hemoglobin A1c 13.5 01/19/2017 05:05 PM    Hemoglobin A1c 13.4 01/18/2017 11:11 PM       Recent Glucose Results:   Lab Results   Component Value Date/Time     (H) 01/25/2017 04:34 AM    GLUCPOC 276 (H) 01/25/2017 12:13 PM    GLUCPOC 280 (H) 01/25/2017 08:32 AM    GLUCPOC 182 (H) 01/24/2017 10:03 PM        Lab Results   Component Value Date/Time    Creatinine 1.86 01/25/2017 04:34 AM       Active Orders   Diet    DIET CARDIAC Regular; Consistent Carb 1800kcal; FR 2000ML        PO intake:   Patient Vitals for the past 72 hrs:   % Diet Eaten   01/25/17 0900 100 %   01/23/17 1800 100 %   01/23/17 1200 100 %   01/23/17 0900 100 %       Current hospital DM medication: Lantus 16 units, Amaryl 2 mg daily and Humalog for correction, high sensitivity    Will continue to follow as needed.     Thank you  Mary Linn RD

## 2017-01-25 NOTE — PROGRESS NOTES
Met with the patient today to introduce role of [facility] . Patient shared he has not worked recently; last worked in 2003 "[employer]" for approximately 30 years. His wife, Lani Hensley, still works full time and the patient is alone during the day time. Shared there is a potential for him to be discharged on inotrope and  will work with him on the financial piece and assisting with home inotrope care. Fernanda's current monthly income consists of his SSA and his wife's employment. [facility] screened him for disability and if his case is accepted by disability unit per [employer] worker he will be screened for Medicaid.  will continue to follow and assist with plan of care.     Gaurav Meadows, MSW, LCSW    Clinical    Jennifer Roman [id number]

## 2017-01-25 NOTE — PROGRESS NOTES
Problem: Falls - Risk of  Goal: *Absence of falls  Outcome: Progressing Towards Goal  Pt remains safe from falls. Compliant with fall prevention measures. Problem: Pressure Ulcer - Risk of  Goal: *Prevention of pressure ulcer  Outcome: Progressing Towards Goal  Pt resistant to turning but does turn self with verbal prompts.     Problem: Heart Failure: Day 4  Goal: Off Pathway (Use only if patient is Off Pathway)  Variance: Patient slowly responding

## 2017-01-26 ENCOUNTER — APPOINTMENT (OUTPATIENT)
Dept: ULTRASOUND IMAGING | Age: 67
DRG: 215 | End: 2017-01-26
Attending: NURSE PRACTITIONER
Payer: SELF-PAY

## 2017-01-26 ENCOUNTER — APPOINTMENT (OUTPATIENT)
Dept: GENERAL RADIOLOGY | Age: 67
DRG: 215 | End: 2017-01-26
Attending: NURSE PRACTITIONER
Payer: SELF-PAY

## 2017-01-26 ENCOUNTER — APPOINTMENT (OUTPATIENT)
Dept: GENERAL RADIOLOGY | Age: 67
DRG: 215 | End: 2017-01-26
Attending: RADIOLOGY
Payer: SELF-PAY

## 2017-01-26 LAB
ALBUMIN SERPL BCP-MCNC: 2.7 G/DL (ref 3.5–5)
ALBUMIN/GLOB SERPL: 0.7 {RATIO} (ref 1.1–2.2)
ALP SERPL-CCNC: 77 U/L (ref 45–117)
ALT SERPL-CCNC: 8 U/L (ref 12–78)
ANION GAP BLD CALC-SCNC: 6 MMOL/L (ref 5–15)
APTT PPP: 47.9 SEC (ref 22.1–32.5)
APTT PPP: 71.4 SEC (ref 22.1–32.5)
AST SERPL W P-5'-P-CCNC: 10 U/L (ref 15–37)
BILIRUB SERPL-MCNC: 0.5 MG/DL (ref 0.2–1)
BNP SERPL-MCNC: 5150 PG/ML (ref 0–125)
BUN SERPL-MCNC: 35 MG/DL (ref 6–20)
BUN/CREAT SERPL: 16 (ref 12–20)
CALCIUM SERPL-MCNC: 8.4 MG/DL (ref 8.5–10.1)
CHLORIDE SERPL-SCNC: 85 MMOL/L (ref 97–108)
CO2 SERPL-SCNC: 32 MMOL/L (ref 21–32)
CREAT SERPL-MCNC: 2.23 MG/DL (ref 0.7–1.3)
ERYTHROCYTE [DISTWIDTH] IN BLOOD BY AUTOMATED COUNT: 15.8 % (ref 11.5–14.5)
GLOBULIN SER CALC-MCNC: 3.7 G/DL (ref 2–4)
GLUCOSE BLD STRIP.AUTO-MCNC: 108 MG/DL (ref 65–100)
GLUCOSE BLD STRIP.AUTO-MCNC: 130 MG/DL (ref 65–100)
GLUCOSE BLD STRIP.AUTO-MCNC: 151 MG/DL (ref 65–100)
GLUCOSE BLD STRIP.AUTO-MCNC: 160 MG/DL (ref 65–100)
GLUCOSE BLD STRIP.AUTO-MCNC: 85 MG/DL (ref 65–100)
GLUCOSE SERPL-MCNC: 122 MG/DL (ref 65–100)
HCT VFR BLD AUTO: 33.3 % (ref 36.6–50.3)
HGB BLD-MCNC: 10.7 G/DL (ref 12.1–17)
MAGNESIUM SERPL-MCNC: 1.9 MG/DL (ref 1.6–2.4)
MCH RBC QN AUTO: 24.3 PG (ref 26–34)
MCHC RBC AUTO-ENTMCNC: 32.1 G/DL (ref 30–36.5)
MCV RBC AUTO: 75.7 FL (ref 80–99)
OSMOLALITY UR: 165 MOSM/KG H2O
PLATELET # BLD AUTO: 139 K/UL (ref 150–400)
POTASSIUM SERPL-SCNC: 4.1 MMOL/L (ref 3.5–5.1)
PROT FLD-MCNC: 2.4 G/DL
PROT SERPL-MCNC: 6.4 G/DL (ref 6.4–8.2)
RBC # BLD AUTO: 4.4 M/UL (ref 4.1–5.7)
SERVICE CMNT-IMP: ABNORMAL
SERVICE CMNT-IMP: NORMAL
SODIUM SERPL-SCNC: 123 MMOL/L (ref 136–145)
SODIUM SERPL-SCNC: 124 MMOL/L (ref 136–145)
SODIUM SERPL-SCNC: 126 MMOL/L (ref 136–145)
SPECIMEN SOURCE FLD: NORMAL
THERAPEUTIC RANGE,PTTT: ABNORMAL SECS (ref 58–77)
THERAPEUTIC RANGE,PTTT: ABNORMAL SECS (ref 58–77)
WBC # BLD AUTO: 6.3 K/UL (ref 4.1–11.1)

## 2017-01-26 PROCEDURE — 83935 ASSAY OF URINE OSMOLALITY: CPT | Performed by: INTERNAL MEDICINE

## 2017-01-26 PROCEDURE — 74011250637 HC RX REV CODE- 250/637: Performed by: INTERNAL MEDICINE

## 2017-01-26 PROCEDURE — 74011250637 HC RX REV CODE- 250/637: Performed by: THORACIC SURGERY (CARDIOTHORACIC VASCULAR SURGERY)

## 2017-01-26 PROCEDURE — 87205 SMEAR GRAM STAIN: CPT | Performed by: NURSE PRACTITIONER

## 2017-01-26 PROCEDURE — 74011636637 HC RX REV CODE- 636/637: Performed by: NURSE PRACTITIONER

## 2017-01-26 PROCEDURE — 74011250636 HC RX REV CODE- 250/636: Performed by: NURSE PRACTITIONER

## 2017-01-26 PROCEDURE — 71010 XR CHEST PORT: CPT

## 2017-01-26 PROCEDURE — 84295 ASSAY OF SERUM SODIUM: CPT | Performed by: INTERNAL MEDICINE

## 2017-01-26 PROCEDURE — 77010033678 HC OXYGEN DAILY

## 2017-01-26 PROCEDURE — 74011250636 HC RX REV CODE- 250/636: Performed by: THORACIC SURGERY (CARDIOTHORACIC VASCULAR SURGERY)

## 2017-01-26 PROCEDURE — 88112 CYTOPATH CELL ENHANCE TECH: CPT | Performed by: THORACIC SURGERY (CARDIOTHORACIC VASCULAR SURGERY)

## 2017-01-26 PROCEDURE — 32555 ASPIRATE PLEURA W/ IMAGING: CPT

## 2017-01-26 PROCEDURE — 74011000258 HC RX REV CODE- 258: Performed by: INTERNAL MEDICINE

## 2017-01-26 PROCEDURE — 74011250636 HC RX REV CODE- 250/636: Performed by: INTERNAL MEDICINE

## 2017-01-26 PROCEDURE — 85027 COMPLETE CBC AUTOMATED: CPT | Performed by: NURSE PRACTITIONER

## 2017-01-26 PROCEDURE — 88305 TISSUE EXAM BY PATHOLOGIST: CPT | Performed by: THORACIC SURGERY (CARDIOTHORACIC VASCULAR SURGERY)

## 2017-01-26 PROCEDURE — 83880 ASSAY OF NATRIURETIC PEPTIDE: CPT | Performed by: NURSE PRACTITIONER

## 2017-01-26 PROCEDURE — 80053 COMPREHEN METABOLIC PANEL: CPT | Performed by: NURSE PRACTITIONER

## 2017-01-26 PROCEDURE — 0W9B3ZX DRAINAGE OF LEFT PLEURAL CAVITY, PERCUTANEOUS APPROACH, DIAGNOSTIC: ICD-10-PCS | Performed by: RADIOLOGY

## 2017-01-26 PROCEDURE — 97116 GAIT TRAINING THERAPY: CPT

## 2017-01-26 PROCEDURE — 85730 THROMBOPLASTIN TIME PARTIAL: CPT | Performed by: NURSE PRACTITIONER

## 2017-01-26 PROCEDURE — 65610000003 HC RM ICU SURGICAL

## 2017-01-26 PROCEDURE — 83735 ASSAY OF MAGNESIUM: CPT | Performed by: NURSE PRACTITIONER

## 2017-01-26 PROCEDURE — 82962 GLUCOSE BLOOD TEST: CPT

## 2017-01-26 PROCEDURE — 84157 ASSAY OF PROTEIN OTHER: CPT | Performed by: NURSE PRACTITIONER

## 2017-01-26 PROCEDURE — 36415 COLL VENOUS BLD VENIPUNCTURE: CPT | Performed by: NURSE PRACTITIONER

## 2017-01-26 PROCEDURE — 74011000258 HC RX REV CODE- 258: Performed by: NURSE PRACTITIONER

## 2017-01-26 PROCEDURE — 77030011256 HC DRSG MEPILEX <16IN NO BORD MOLN -A

## 2017-01-26 PROCEDURE — 85730 THROMBOPLASTIN TIME PARTIAL: CPT | Performed by: THORACIC SURGERY (CARDIOTHORACIC VASCULAR SURGERY)

## 2017-01-26 PROCEDURE — 74011250637 HC RX REV CODE- 250/637: Performed by: NURSE PRACTITIONER

## 2017-01-26 RX ORDER — TOLVAPTAN 15 MG/1
15 TABLET ORAL ONCE
Status: COMPLETED | OUTPATIENT
Start: 2017-01-26 | End: 2017-01-26

## 2017-01-26 RX ORDER — AMIODARONE HYDROCHLORIDE 200 MG/1
400 TABLET ORAL EVERY 12 HOURS
Status: DISCONTINUED | OUTPATIENT
Start: 2017-01-26 | End: 2017-01-27

## 2017-01-26 RX ORDER — TRAZODONE HYDROCHLORIDE 100 MG/1
100 TABLET ORAL
Status: DISCONTINUED | OUTPATIENT
Start: 2017-01-26 | End: 2017-01-31

## 2017-01-26 RX ORDER — HEPARIN SODIUM 1000 [USP'U]/ML
2000 INJECTION, SOLUTION INTRAVENOUS; SUBCUTANEOUS ONCE
Status: COMPLETED | OUTPATIENT
Start: 2017-01-26 | End: 2017-01-26

## 2017-01-26 RX ORDER — POLYETHYLENE GLYCOL 3350 17 G/17G
17 POWDER, FOR SOLUTION ORAL DAILY
Status: DISCONTINUED | OUTPATIENT
Start: 2017-01-26 | End: 2017-01-31

## 2017-01-26 RX ADMIN — POLYETHYLENE GLYCOL 3350 17 G: 17 POWDER, FOR SOLUTION ORAL at 12:29

## 2017-01-26 RX ADMIN — OXYCODONE HYDROCHLORIDE AND ACETAMINOPHEN 2 TABLET: 5; 325 TABLET ORAL at 12:56

## 2017-01-26 RX ADMIN — Medication 400 MG: at 09:03

## 2017-01-26 RX ADMIN — PANTOPRAZOLE SODIUM 40 MG: 40 TABLET, DELAYED RELEASE ORAL at 09:03

## 2017-01-26 RX ADMIN — CEFAZOLIN SODIUM 1 G: 1 INJECTION, POWDER, FOR SOLUTION INTRAMUSCULAR; INTRAVENOUS at 07:48

## 2017-01-26 RX ADMIN — Medication 10 ML: at 20:51

## 2017-01-26 RX ADMIN — TOLVAPTAN 15 MG: 15 TABLET ORAL at 09:04

## 2017-01-26 RX ADMIN — AMIODARONE HYDROCHLORIDE 0.5 MG/MIN: 50 INJECTION, SOLUTION INTRAVENOUS at 09:14

## 2017-01-26 RX ADMIN — OXYCODONE HYDROCHLORIDE AND ACETAMINOPHEN 2 TABLET: 5; 325 TABLET ORAL at 00:12

## 2017-01-26 RX ADMIN — OXYCODONE HYDROCHLORIDE AND ACETAMINOPHEN 2 TABLET: 5; 325 TABLET ORAL at 22:05

## 2017-01-26 RX ADMIN — OXYCODONE HYDROCHLORIDE AND ACETAMINOPHEN 2 TABLET: 5; 325 TABLET ORAL at 04:20

## 2017-01-26 RX ADMIN — MILRINONE LACTATE 0.38 MCG/KG/MIN: 200 INJECTION, SOLUTION INTRAVENOUS at 12:35

## 2017-01-26 RX ADMIN — HEPARIN SODIUM AND DEXTROSE 25 UNITS/KG/HR: 10000; 5 INJECTION INTRAVENOUS at 22:08

## 2017-01-26 RX ADMIN — CEFAZOLIN SODIUM 1 G: 1 INJECTION, POWDER, FOR SOLUTION INTRAMUSCULAR; INTRAVENOUS at 16:02

## 2017-01-26 RX ADMIN — OXYCODONE HYDROCHLORIDE AND ACETAMINOPHEN 2 TABLET: 5; 325 TABLET ORAL at 07:47

## 2017-01-26 RX ADMIN — DOCUSATE SODIUM AND SENNOSIDES 1 TABLET: 8.6; 5 TABLET, FILM COATED ORAL at 09:03

## 2017-01-26 RX ADMIN — AMIODARONE HYDROCHLORIDE 400 MG: 200 TABLET ORAL at 20:50

## 2017-01-26 RX ADMIN — INSULIN GLARGINE 16 UNITS: 100 INJECTION, SOLUTION SUBCUTANEOUS at 09:26

## 2017-01-26 RX ADMIN — GLIMEPIRIDE 2 MG: 2 TABLET ORAL at 09:03

## 2017-01-26 RX ADMIN — ATORVASTATIN CALCIUM 10 MG: 10 TABLET, FILM COATED ORAL at 09:03

## 2017-01-26 RX ADMIN — HEPARIN SODIUM AND DEXTROSE 23 UNITS/KG/HR: 10000; 5 INJECTION INTRAVENOUS at 07:49

## 2017-01-26 RX ADMIN — Medication 81 MG: at 09:03

## 2017-01-26 RX ADMIN — TRAZODONE HYDROCHLORIDE 50 MG: 50 TABLET ORAL at 00:13

## 2017-01-26 RX ADMIN — MILRINONE LACTATE 0.38 MCG/KG/MIN: 200 INJECTION, SOLUTION INTRAVENOUS at 22:09

## 2017-01-26 RX ADMIN — HEPARIN SODIUM 2000 UNITS: 1000 INJECTION, SOLUTION INTRAVENOUS; SUBCUTANEOUS at 22:18

## 2017-01-26 RX ADMIN — MILRINONE LACTATE 0.38 MCG/KG/MIN: 200 INJECTION, SOLUTION INTRAVENOUS at 00:17

## 2017-01-26 RX ADMIN — ONDANSETRON 4 MG: 2 INJECTION INTRAMUSCULAR; INTRAVENOUS at 09:03

## 2017-01-26 RX ADMIN — CEFAZOLIN SODIUM 1 G: 1 INJECTION, POWDER, FOR SOLUTION INTRAMUSCULAR; INTRAVENOUS at 22:06

## 2017-01-26 RX ADMIN — AMIODARONE HYDROCHLORIDE 400 MG: 200 TABLET ORAL at 12:30

## 2017-01-26 RX ADMIN — OXYCODONE HYDROCHLORIDE AND ACETAMINOPHEN 2 TABLET: 5; 325 TABLET ORAL at 18:02

## 2017-01-26 NOTE — PROGRESS NOTES
Received a phone call from patient's wife that was somewhat alarming. She reports the patient shared 's contact information with her and she is very distressed about his potential discharge. She works full time and reportedly has a bad back and cannot assist the patient. Shared with her that per the last PT note the patient requires SBA and  will ask PT/OT to assess him prior to discharge to ensure a safe discharge. Patient's wife indicated her boss gave her an \"ultimateum\" that she has to either quit her job or her  needs to go to a rehab facility.  reiterated the therapists will make recommendations based on their assessment of the patient. Also discussed the patient being screened for disability and potential to be screened for Medicaid. Will continue to follow and assist with plan of care.     Karon Villatoro, MSW, LCSW    Clinical    Jennifer Roman [phone]

## 2017-01-26 NOTE — PROGRESS NOTES
[facility]   [address] [address], [facility]  Phone: [phone]   [phone]       Nephrology Progress Note  Ashanti De Luna     1950     [id number]  Date of Admission : 1/20/2017 01/26/17    CC: Follow up for CKD/ARF      Assessment and Plan   CHACORTA on CKD :  - CHACORTA likely 2/2 cardiorenal syndrome  - Cr rising today  - likely intravascularly dry  - suggest hold diuretics  - follow UOP  - labs in AM    Hyponatremia :  - likely has chronic hyponatremia from CHF + alcoholism  - his PNA can be playing a role as well   - tolvaptan 15mg x 1 now  - hold diuretics as above  - Na levels Q8 for now    Acute on Chronic Systolic CHF w/ severe CMP  - echo with EF 5-10%, severely dilated LV  - Multivessel CAD : being evaluated for CABG   - On Milrinone     Cellulitis RLE w/ Pustular lesions     Pulm HTN     Chronic smoker w/VENANCIO -PNA    Discussed with Dr. Ramya Brennan and nursing     Interval History:  Seen and examined. Feeling ok. UOP stable 2.8 liters. Na only 123. No cp, sob, n/v/d reported. Review of Systems: Pertinent items are noted in HPI.     Current Medications:   Current Facility-Administered Medications   Medication Dose Route Frequency    tolvaptan (SAMSCA) tablet 15 mg  15 mg Oral ONCE    bacitracin 500 unit/gram packet 1 Packet  1 Packet Topical PRN    atorvastatin (LIPITOR) tablet 10 mg  10 mg Oral DAILY    insulin glargine (LANTUS) injection 16 Units  16 Units SubCUTAneous DAILY    glimepiride (AMARYL) tablet 2 mg  2 mg Oral ACB    pantoprazole (PROTONIX) tablet 40 mg  40 mg Oral ACB    ondansetron (ZOFRAN) injection 4 mg  4 mg IntraVENous Q6H PRN    amiodarone (CORDARONE) 450 mg in dextrose 5% 250 mL infusion  0.5-1 mg/min IntraVENous TITRATE    traZODone (DESYREL) tablet 50 mg  50 mg Oral QHS PRN    senna-docusate (PERICOLACE) 8.6-50 mg per tablet 1 Tab  1 Tab Oral DAILY    ceFAZolin (ANCEF) 1 g in 0.9% sodium chloride (MBP/ADV) 50 mL  1 g IntraVENous Q8H    0.9% sodium chloride infusion  10 mL/hr IntraVENous CONTINUOUS    vasopressin (VASOSTRICT) 20 Units in 0.9% sodium chloride 100 mL infusion  0.01-0.04 Units/min IntraVENous TITRATE    sodium chloride (NS) flush 5-10 mL  5-10 mL InterCATHeter Q8H    sodium chloride (NS) flush 5-10 mL  5-10 mL InterCATHeter PRN    acetaminophen (TYLENOL) tablet 650 mg  650 mg Oral Q6H PRN    aspirin delayed-release tablet 81 mg  81 mg Oral DAILY    glucagon (GLUCAGEN) injection 1 mg  1 mg IntraMUSCular PRN    glucose chewable tablet 16 g  4 Tab Oral PRN    insulin lispro (HUMALOG) injection   SubCUTAneous AC&HS    magnesium oxide (MAG-OX) tablet 400 mg  400 mg Oral DAILY    nicotine (NICODERM CQ) 21 mg/24 hr patch 1 Patch  1 Patch TransDERmal Q24H    0.9% sodium chloride infusion  9 mL/hr IntraVENous CONTINUOUS    sodium chloride (NS) flush 5-10 mL  5-10 mL IntraVENous Q8H    sodium chloride (NS) flush 5-10 mL  5-10 mL IntraVENous PRN    albuterol (PROVENTIL VENTOLIN) nebulizer solution 2.5 mg  2.5 mg Nebulization Q4H PRN    heparin 25,000 units in D5W 250 ml infusion  12-25 Units/kg/hr IntraVENous TITRATE    morphine injection 2 mg  2 mg IntraVENous Q4H PRN    diphenhydrAMINE (BENADRYL) capsule 25 mg  25 mg Oral QHS PRN    oxyCODONE-acetaminophen (PERCOCET) 5-325 mg per tablet 2 Tab  2 Tab Oral Q4H PRN    milrinone (PRIMACOR) 20 MG/100 ML D5W infusion  0.375 mcg/kg/min IntraVENous CONTINUOUS    ELECTROLYTE REPLACEMENT PROTOCOL  1 Each Other PRN      No Known Allergies    Objective:  Vitals:    Vitals:    01/26/17 0400 01/26/17 0500 01/26/17 0600 01/26/17 0640   BP: 93/56 93/61 91/63    Pulse: 87 86 99    Resp: 20 18 23    Temp: 98.4 °F (36.9 °C)      SpO2: 91% 97% 94%    Weight:    81 kg (178 lb 9.2 oz)   Height:         Intake and Output:     01/24 1901 - 01/26 0700  In: 3821.3 [P.O.:1200;  I.V.:2621.3]  Out: 3605 [Urine:3605]    Physical Examination:  General: Awake, alert  Resp:  Lungs mostly clear  CV:  RRR,  no murmur or rub,+ LE edema  GI:  Soft, NT, + Bowel sounds, no hepatosplenomegaly  Neurologic:  Non focal  Skin:  RLE cellulitis   :  No mendoza    []    High complexity decision making was performed  []    Patient is at high-risk of decompensation with multiple organ involvement    Lab Data Personally Reviewed: I have reviewed all the pertinent labs, microbiology data and radiology studies during assessment.     Recent Labs      01/26/17 0345 01/25/17   2134  01/25/17   1410  01/25/17   0434  01/24/17   1951 01/24/17   1319  01/24/17   0356  01/23/17   1811  01/23/17   1209   NA  123*  123*  122*  126*  125*  123*  123*   --   124*   K  4.1   --    --   4.1   --   4.2  4.4  3.9  4.1   CL  85*   --    --   87*   --    --   84*   --   84*   CO2  32   --    --   30   --    --   32   --   33*   GLU  122*   --    --   201*   --    --   176*   --   214*   BUN  35*   --    --   30*   --    --   28*   --   28*   CREA  2.23*   --    --   1.86*   --    --   1.69*   --   1.60*   CA  8.4*   --    --   8.5   --    --   8.7   --   8.5   MG  1.9   --    --   2.2   --   1.9  2.0  1.7  2.1   PHOS   --    --    --    --    --    --    --    --   2.5*   ALB  2.7*   --    --   2.6*   --    --   2.6*   --   2.8*   SGOT  10*   --    --   11*   --    --   14*   --    --    ALT  8*   --    --   10*   --    --   12   --    --    INR   --    --    --    --    --    --    --    --   1.3*     Recent Labs      01/26/17 0345 01/25/17 0434 01/24/17   0356   WBC  6.3  6.2  6.4   HGB  10.7*  11.3*  11.5*   HCT  33.3*  34.9*  35.8*   PLT  139*  147*  156     No results found for: SDES  Lab Results   Component Value Date/Time    Culture result: PENDING 01/25/2017 09:34 PM    Culture result: NO SIGNIFICANT GROWTH 01/19/2017 01:35 AM     Recent Results (from the past 24 hour(s))   GLUCOSE, POC    Collection Time: 01/25/17  8:32 AM   Result Value Ref Range    Glucose (POC) 280 (H) 65 - 100 mg/dL    Performed by [place] Oscar    PTT    Collection Time: 01/25/17 10:18 AM   Result Value Ref Range    aPTT 60.2 (H) 22.1 - 32.5 sec    aPTT, therapeutic range     58.0 - 77.0 SECS   GLUCOSE, POC    Collection Time: 01/25/17 12:13 PM   Result Value Ref Range    Glucose (POC) 276 (H) 65 - 100 mg/dL    Performed by Rajan Holcomb    SODIUM    Collection Time: 01/25/17  2:10 PM   Result Value Ref Range    Sodium 122 (L) 136 - 145 mmol/L   GLUCOSE, POC    Collection Time: 01/25/17  6:06 PM   Result Value Ref Range    Glucose (POC) 100 65 - 100 mg/dL    Performed by Clover Alfaro    SODIUM    Collection Time: 01/25/17  9:34 PM   Result Value Ref Range    Sodium 123 (L) 136 - 145 mmol/L   CULTURE, WOUND W GRAM STAIN    Collection Time: 01/25/17  9:34 PM   Result Value Ref Range    Special Requests: NO SPECIAL REQUESTS      GRAM STAIN RARE  WBCS SEEN        GRAM STAIN NO ORGANISMS SEEN      Culture result: PENDING    GLUCOSE, POC    Collection Time: 01/25/17 10:29 PM   Result Value Ref Range    Glucose (POC) 132 (H) 65 - 100 mg/dL    Performed by Sylvester Petersen     METABOLIC PANEL, COMPREHENSIVE    Collection Time: 01/26/17  3:45 AM   Result Value Ref Range    Sodium 123 (L) 136 - 145 mmol/L    Potassium 4.1 3.5 - 5.1 mmol/L    Chloride 85 (L) 97 - 108 mmol/L    CO2 32 21 - 32 mmol/L    Anion gap 6 5 - 15 mmol/L    Glucose 122 (H) 65 - 100 mg/dL    BUN 35 (H) 6 - 20 MG/DL    Creatinine 2.23 (H) 0.70 - 1.30 MG/DL    BUN/Creatinine ratio 16 12 - 20      GFR est AA 36 (L) >60 ml/min/1.73m2    GFR est non-AA 30 (L) >60 ml/min/1.73m2    Calcium 8.4 (L) 8.5 - 10.1 MG/DL    Bilirubin, total 0.5 0.2 - 1.0 MG/DL    ALT 8 (L) 12 - 78 U/L    AST 10 (L) 15 - 37 U/L    Alk.  phosphatase 77 45 - 117 U/L    Protein, total 6.4 6.4 - 8.2 g/dL    Albumin 2.7 (L) 3.5 - 5.0 g/dL    Globulin 3.7 2.0 - 4.0 g/dL    A-G Ratio 0.7 (L) 1.1 - 2.2     MAGNESIUM    Collection Time: 01/26/17  3:45 AM   Result Value Ref Range    Magnesium 1.9 1.6 - 2.4 mg/dL   CBC W/O DIFF Collection Time: 01/26/17  3:45 AM   Result Value Ref Range    WBC 6.3 4.1 - 11.1 K/uL    RBC 4.40 4. 10 - 5.70 M/uL    HGB 10.7 (L) 12.1 - 17.0 g/dL    HCT 33.3 (L) 36.6 - 50.3 %    MCV 75.7 (L) 80.0 - 99.0 FL    MCH 24.3 (L) 26.0 - 34.0 PG    MCHC 32.1 30.0 - 36.5 g/dL    RDW 15.8 (H) 11.5 - 14.5 %    PLATELET 902 (L) 067 - 400 K/uL   PTT    Collection Time: 01/26/17  3:45 AM   Result Value Ref Range    aPTT 71.4 (H) 22.1 - 32.5 sec    aPTT, therapeutic range     58.0 - 77.0 SECS   GLUCOSE, POC    Collection Time: 01/26/17  4:33 AM   Result Value Ref Range    Glucose (POC) 151 (H) 65 - 100 mg/dL    Performed by Rosamaria Burden             I have reviewed the flowsheets. Chart and Pertinent Notes have been reviewed. No change in PMH ,family and social history from Consult note.       Rocío Ly MD

## 2017-01-26 NOTE — PROGRESS NOTES
[facility] Progress Note      NAME:  Estil Antonetta Peabody. :   1950   MRN:   [id number]   PCP:  None  CARD:   Dr. La Elizabeth    Date:  2017     Estil Antonetta Peabody. is a 61 y.o. male with a who presented for further evaluation of severe CAD and systolic heart failure. Subjective:   He was initially seen at [facility] years ago and told that he had heart failure with LVEF 30%. He was lost to follow up due to lack of insurance and has not been seen by a physician in the interim. He complains of progressive fatigue, NAVARRO, PND, and edema over the past year, prompting presentation to [facility]. He also complains of lower extremity bullae, weeping, and pain. He denies palpitations, presyncope, syncope, or chest pain. Past 24 hours:  Complains of productive cough - looks like thick \"prune juice\"  CVP 11  CXR with moderate left pleural effusion  Continues to have edema  Dyspnea has improved  Good appetite        Objective:     Visit Vitals    /66    Pulse 97    Temp 98.6 °F (37 °C)    Resp 17    Ht 5' 9.5\" (1.765 m)    Wt 81 kg (178 lb 9.2 oz)    SpO2 94%    BMI 25.99 kg/m2          General:  fatigued    HEENT: Normocephalic, EOMI, PERRLA, Hearing intact, trachea mid-line    Neck:  supple, no significant adenopathy, carotids upstroke normal bilaterally, no bruits    CVP:  12  cm  ( ++ ) HJR    Heart:  Diminished PMI    Normal S1 and S2, S3 gallop    Murmur: 2/6 systolic murmur    Lungs: Diminished breath sounds left lower lung    Abdomen: soft, non-tender. Bowel sounds normal. +HSM    Extremity: Warm, red on the right lower extremity with 4+ pitting edema bilaterally    Neuro: Alert and oriented to person, place, and time; normal strength and tone. Normal symmetric reflexes. Normal coordination and gait      1901 -  0700  In: 3821.3 [P.O.:1200;  I.V.:2621.3]  Out: 3605 [Urine:3605]  O2 Flow Rate (L/min): 2 l/min O2 Device: Nasal cannula  Temp (24hrs), Av.4 °F (36.9 °C), Min:97.8 °F (36.6 °C), Max:98.6 °F (37 °C)          Care Plan discussed with:    Comments   Patient x    Family      RN x    Care Manager                    Consultant:          Past History:     Past Medical History   Diagnosis Date    Cardiomyopathy ([address] 1/20/2017     A. Echo (1/19/17):  EF 5-10% with severe GHK,. Mildly dil LA. Mild TR. PASP 46. No past surgical history on file. Social History   Substance Use Topics    Smoking status: Not on file    Smokeless tobacco: Not on file    Alcohol use Not on file        No family history on file. Allergies:   No Known Allergies       Data Review:     CXR:   CXR Results  (Last 48 hours)               01/26/17 0451  XR CHEST PORT Final result    Impression:  IMPRESSION: Dense left lower lobe consolidation with left effusion. Appropriate   PICC placement. Narrative:  EXAM:  XR CHEST PORT       INDICATION:  PAC       COMPARISON:  January 25       FINDINGS: A portable AP radiograph of the chest was obtained at 0419 hours. The   patient is on a cardiac monitor. A left upper extremity PICC line tip is in the   region of the superior vena cava. The [place]-Val catheter has been removed. There   is dense left lower lobe consolidation with left effusion. The cardiac and   mediastinal contours and pulmonary vascularity are normal.  The bones and soft   tissues are grossly within normal limits. 01/25/17 0445  XR CHEST PORT Final result    Impression:  IMPRESSION:    Central pulmonary vascular congestive change. Left pleural effusion and basilar atelectasis/airspace disease. Narrative:  Clinical history: Systolic heart failure       INDICATION:  Heart failure. COMPARISON:  1/24/2017. FINDINGS:     An AP portable view was obtained of the chest.     The right jugular [place]-Val catheter remains in place.      The heart is minimally enlarged     There is central pulmonary vascular congestive change. Persistent opacification of the left lung base by a left pleural effusion and   airspace disease/atelectasis is seen                     Echocardiogram:   Echo Results  (Last 48 hours)    None          No results found for this visit on 01/20/17. ECG:  EKG: ST with occasional PVC, NSIVCD, LAE    LABS:  Recent Results (from the past 24 hour(s))   SODIUM    Collection Time: 01/25/17  2:10 PM   Result Value Ref Range    Sodium 122 (L) 136 - 145 mmol/L   GLUCOSE, POC    Collection Time: 01/25/17  6:06 PM   Result Value Ref Range    Glucose (POC) 100 65 - 100 mg/dL    Performed by Scooter Hand    SODIUM    Collection Time: 01/25/17  9:34 PM   Result Value Ref Range    Sodium 123 (L) 136 - 145 mmol/L   CULTURE, WOUND W GRAM STAIN    Collection Time: 01/25/17  9:34 PM   Result Value Ref Range    Special Requests: NO SPECIAL REQUESTS      GRAM STAIN RARE  WBCS SEEN        GRAM STAIN NO ORGANISMS SEEN      Culture result:        LIGHT  STREPTOCOCCI, BETA HEMOLYTIC GROUP C  . .. Penicillin and ampicillin are drugs of choice for treatment of beta-hemolytic streptococcal infections. Susceptibility testing of penicillins and beta-lactams approved by the FDA for treatment of beta-hemolytic streptococcal infections need not be performed routinely, because nonsusceptible isolates are extremely rare.  CLSI 2012      Culture result: LIGHT  YEAST       GLUCOSE, POC    Collection Time: 01/25/17 10:29 PM   Result Value Ref Range    Glucose (POC) 132 (H) 65 - 100 mg/dL    Performed by Mary Kaba     METABOLIC PANEL, COMPREHENSIVE    Collection Time: 01/26/17  3:45 AM   Result Value Ref Range    Sodium 123 (L) 136 - 145 mmol/L    Potassium 4.1 3.5 - 5.1 mmol/L    Chloride 85 (L) 97 - 108 mmol/L    CO2 32 21 - 32 mmol/L    Anion gap 6 5 - 15 mmol/L    Glucose 122 (H) 65 - 100 mg/dL    BUN 35 (H) 6 - 20 MG/DL    Creatinine 2.23 (H) 0.70 - 1.30 MG/DL    BUN/Creatinine ratio 16 12 - 20      GFR est AA 36 (L) >60 ml/min/1.73m2 GFR est non-AA 30 (L) >60 ml/min/1.73m2    Calcium 8.4 (L) 8.5 - 10.1 MG/DL    Bilirubin, total 0.5 0.2 - 1.0 MG/DL    ALT 8 (L) 12 - 78 U/L    AST 10 (L) 15 - 37 U/L    Alk. phosphatase 77 45 - 117 U/L    Protein, total 6.4 6.4 - 8.2 g/dL    Albumin 2.7 (L) 3.5 - 5.0 g/dL    Globulin 3.7 2.0 - 4.0 g/dL    A-G Ratio 0.7 (L) 1.1 - 2.2     MAGNESIUM    Collection Time: 01/26/17  3:45 AM   Result Value Ref Range    Magnesium 1.9 1.6 - 2.4 mg/dL   CBC W/O DIFF    Collection Time: 01/26/17  3:45 AM   Result Value Ref Range    WBC 6.3 4.1 - 11.1 K/uL    RBC 4.40 4. 10 - 5.70 M/uL    HGB 10.7 (L) 12.1 - 17.0 g/dL    HCT 33.3 (L) 36.6 - 50.3 %    MCV 75.7 (L) 80.0 - 99.0 FL    MCH 24.3 (L) 26.0 - 34.0 PG    MCHC 32.1 30.0 - 36.5 g/dL    RDW 15.8 (H) 11.5 - 14.5 %    PLATELET 907 (L) 411 - 400 K/uL   PTT    Collection Time: 01/26/17  3:45 AM   Result Value Ref Range    aPTT 71.4 (H) 22.1 - 32.5 sec    aPTT, therapeutic range     58.0 - 77.0 SECS   PRO-BNP    Collection Time: 01/26/17  3:45 AM   Result Value Ref Range    NT pro-BNP 5150 (H) 0 - 125 PG/ML   GLUCOSE, POC    Collection Time: 01/26/17  4:33 AM   Result Value Ref Range    Glucose (POC) 151 (H) 65 - 100 mg/dL    Performed by Enrique Portillo     GLUCOSE, POC    Collection Time: 01/26/17  8:04 AM   Result Value Ref Range    Glucose (POC) 108 (H) 65 - 100 mg/dL    Performed by Jocelin Guerrero    GLUCOSE, POC    Collection Time: 01/26/17 12:16 PM   Result Value Ref Range    Glucose (POC) 160 (H) 65 - 100 mg/dL    Performed by Jocelin Benavides*          Medications reviewed:    Current Facility-Administered Medications   Medication Dose Route Frequency    traZODone (DESYREL) tablet 100 mg  100 mg Oral QHS PRN    polyethylene glycol (MIRALAX) packet 17 g  17 g Oral DAILY    amiodarone (CORDARONE) tablet 400 mg  400 mg Oral Q12H    bacitracin 500 unit/gram packet 1 Packet  1 Packet Topical PRN    atorvastatin (LIPITOR) tablet 10 mg  10 mg Oral DAILY    insulin glargine (LANTUS) injection 16 Units  16 Units SubCUTAneous DAILY    glimepiride (AMARYL) tablet 2 mg  2 mg Oral ACB    pantoprazole (PROTONIX) tablet 40 mg  40 mg Oral ACB    ondansetron (ZOFRAN) injection 4 mg  4 mg IntraVENous Q6H PRN    senna-docusate (PERICOLACE) 8.6-50 mg per tablet 1 Tab  1 Tab Oral DAILY    ceFAZolin (ANCEF) 1 g in 0.9% sodium chloride (MBP/ADV) 50 mL  1 g IntraVENous Q8H    0.9% sodium chloride infusion  10 mL/hr IntraVENous CONTINUOUS    sodium chloride (NS) flush 5-10 mL  5-10 mL InterCATHeter Q8H    sodium chloride (NS) flush 5-10 mL  5-10 mL InterCATHeter PRN    acetaminophen (TYLENOL) tablet 650 mg  650 mg Oral Q6H PRN    aspirin delayed-release tablet 81 mg  81 mg Oral DAILY    glucagon (GLUCAGEN) injection 1 mg  1 mg IntraMUSCular PRN    glucose chewable tablet 16 g  4 Tab Oral PRN    insulin lispro (HUMALOG) injection   SubCUTAneous AC&HS    magnesium oxide (MAG-OX) tablet 400 mg  400 mg Oral DAILY    nicotine (NICODERM CQ) 21 mg/24 hr patch 1 Patch  1 Patch TransDERmal Q24H    0.9% sodium chloride infusion  3 mL/hr IntraVENous CONTINUOUS    sodium chloride (NS) flush 5-10 mL  5-10 mL IntraVENous Q8H    sodium chloride (NS) flush 5-10 mL  5-10 mL IntraVENous PRN    albuterol (PROVENTIL VENTOLIN) nebulizer solution 2.5 mg  2.5 mg Nebulization Q4H PRN    heparin 25,000 units in D5W 250 ml infusion  12-25 Units/kg/hr IntraVENous TITRATE    morphine injection 2 mg  2 mg IntraVENous Q4H PRN    diphenhydrAMINE (BENADRYL) capsule 25 mg  25 mg Oral QHS PRN    oxyCODONE-acetaminophen (PERCOCET) 5-325 mg per tablet 2 Tab  2 Tab Oral Q4H PRN    milrinone (PRIMACOR) 20 MG/100 ML D5W infusion  0.375 mcg/kg/min IntraVENous CONTINUOUS    ELECTROLYTE REPLACEMENT PROTOCOL  1 Each Other PRN           IMPRESSION:   1. Acute on chronic systolic heart failure (ICM) - Stage D, NYHA Class IV  2. Severe native coronary artery disease  3. Diabetes Mellitus, Hga1c 13.5  4.  History of tobacco abuse  5. Cellulitis  6. CHACORTA on CKD3  7. Pulmonary HTN  8. Persistent atrial fibrillation  9. Hyponatremia  10. Left pleural effusion       PLAN:   1. Continue IV milrinone 0.3 mcg/kg/min, Follow I/O, Replete electrolytes, Hold ACE-I due to CHACORTA on CKD. Hold BB due to hypotension. Hold bumex today  2. Continue ASA, statin, low dose BB; too high risk for CABG d/t low LVEF and diabetes out of control  3. Start lantus and sliding scale insulin, accuchecks, diabetic education  4. Schedule PFTs, carotid ultrasound, ABIs to further assess risk for surgical revascularization  5. IV cefazolin 1 gram q 8 hours  6. Smoking cessation counseling, nicotine patch  7. IV heparin infusion and IV amiodarone infusion  8. Consult IR to perform left thoracentesis, send fluid for gram stain, culture, and cytology - hold IV heparin for thoracentesis           Thank you for letting me see him with you,    Dorie Aggarwal MD, [place] [address] Director  Jennifer Roman [address] [place] [address], [address] [place] [address]  Office: [phone]  Fax: [phone]  24 hour VAD/HF Pager: [phone]

## 2017-01-26 NOTE — PROGRESS NOTES
Problem: Falls - Risk of  Goal: *Absence of falls  Outcome: Progressing Towards Goal  Incorporates appropriate ambulating technique    Problem: Heart Failure: Discharge Outcomes  Goal: *Demonstrates ability to perform prescribed activity without shortness of breath or discomfort  Outcome: Progressing Towards Goal  Improving ambulation today.    Goal: *Verbalizes understanding and describes prescribed diet  Outcome: Progressing Towards Goal  Minimally understands need to monitor diet  Goal: *Understands and describes signs and symptoms to report to providers(Stroke Metric)  Outcome: Progressing Towards Goal  Understands need to weigh self and notify provider for increasing SOB/ Edema

## 2017-01-26 NOTE — PROGRESS NOTES
Bedside and Verbal shift change report given to Madelyn Higuera (oncoming nurse) by   Ck De Jesus (offgoing nurse). Report included the following information SBAR, Kardex, MAR and Cardiac Rhythm NSR.

## 2017-01-26 NOTE — PROGRESS NOTES
0800- bedside report received. Assessment performed. Patient up in chair. 1300- Heparin gtt stopped per / Gregg Russell NP.    [address] at bedside- Removed 1200cc from Thoracentesis- Patient tolerated well. Heparin gtt restarted. 1600- Bedside and Verbal shift change report given to Kayla Mcelroy RN (oncoming nurse) by Dariela Noel RN (offgoing nurse). Report given with SBAR, Kardex, Intake/Output and MAR.

## 2017-01-26 NOTE — PROGRESS NOTES
Chart reviewed, patient received supine in bed. Informed patient now with Sarika Pastures out it opens up options for therapy. Patient stating no therapy at this time as he is emotionally trying to process all of the bad news he just received and that he feels it would also not be good to participate in therapy prior to the procedure. Informed of the benefits of participating pre-op. Patient declining. Instruction on benefits with nursing OOB, toileting at the real commode, completing ADLs as able to prevent further weakness, patient agreed. Will f/u later today if patient available if not tomorrow.  Ebenezer Shelby M.S., OTR/L

## 2017-01-26 NOTE — PROGRESS NOTES
Infectious Diseases Progress Note    Antibiotic Summary:  Levaquin  --   Vancomycin  --   Ancef    -- present      Subjective:     No new symptoms; sat in the chair but legs eventually began to ache. Objective:     Vitals:   Visit Vitals    BP 97/52    Pulse 91    Temp 97.8 °F (36.6 °C)    Resp 16    Ht 5' 9.5\" (1.765 m)    Wt 80.3 kg (177 lb 0.5 oz)    SpO2 98%    BMI 25.77 kg/m2        Tmax:  Temp (24hrs), Av.8 °F (36.6 °C), Min:97.2 °F (36.2 °C), Max:98.2 °F (36.8 °C)      Exam:  General appearance: alert, no distress  Lungs: few basilar rales  Heart: RRR  Abdomen: soft and nontender  Left leg: tiny ulcers are drying up and erythema is much better  Right leg: ulcers range from ~3 mm up to 14 mm with serous ooze and scant purulence at base; erythema is about the same. IV Lines: RIJ inserted     Labs:    Recent Labs      17   0434  17   0356  17   1209  17   0546   WBC  6.2  6.4   --   6.6   HGB  11.3*  11.5*   --   11.8*   PLT  147*  156   --   174   BUN  30*  28*  28*  27*   CREA  1.86*  1.69*  1.60*  1.63*   TBILI  0.6  0.9   --   0.8   SGOT  11*  14*   --   14*   AP  83  81   --   87       Assessment:     1. Bilateral venous stasis disease of the LEs +/- cellulitis: The left leg has improved nicely but the right leg is lagging behind     2. OHD -- IHD/CAD; CHF; pulm HTN     3. CRF with acute exacerbation     4. NIDDM -- new diagnosis     5. Probable COPD -- heavy smoking since age 7      10. Anemia -- HC/MC -- positive family history of colon cancer -- may need GI W/U    Plan:     1. Continue Ancef    2.  I sent a culture of the right leg ulcers    Kathleen Zapien MD

## 2017-01-26 NOTE — CARDIO/PULMONARY
Cardiac Wellness: Heart Failure education folder given to Ambreen. Ray Starks Avoiding Hidden Sodium in Foods education added to folder. Educated using teach back method. Discussed diagnosis definition and assessed patient understanding. Reviewed importance of daily weight monitoring and low sodium diet (less than 1500 mg. daily). Encouraged activity and rest periods within symptom limitations and as ordered by physician. .  Reviewed the \"heart failure zones\" with patient and determined he is in the yellow zone. Discussed importance of reporting signs and symptoms of exacerbation  (moving into the next zone) and when to report them to the doctor to prevent re-hospitalization. Fernanda Kelley. was encouraged to keep all appointments with doctor. Discussed ability to obtain prescription meds and encouraged conversations with physician if unable to do so. Smoking history assessed. Pt is a former smoker. HF teachback questions answered by patient.     Navneet Coronel RN

## 2017-01-26 NOTE — PROGRESS NOTES
Attended IDR in CVICU where care of patient was discussed. [facility] Staff  (Greg Alcantara Patient Care Specialist)   Paging Service [phone])

## 2017-01-26 NOTE — DIABETES MGMT
DTC Progress Note    Recommendations/ Comments: Blood sugars improved since yesterday. DTC will continue to follow and will follow up for further education when patient no longer in ICU. Chart reviewed on Fernanda Nascimento .    Patient is a 61 y.o. male with newly diagnosed Type 2 Diabetes. A1c:   Lab Results   Component Value Date/Time    Hemoglobin A1c 13.5 01/19/2017 05:05 PM    Hemoglobin A1c 13.4 01/18/2017 11:11 PM       Recent Glucose Results:   Lab Results   Component Value Date/Time     (H) 01/26/2017 03:45 AM    GLUCPOC 160 (H) 01/26/2017 12:16 PM    GLUCPOC 108 (H) 01/26/2017 08:04 AM    GLUCPOC 151 (H) 01/26/2017 04:33 AM        Lab Results   Component Value Date/Time    Creatinine 2.23 01/26/2017 03:45 AM       Active Orders   Diet    DIET CARDIAC Regular; Consistent Carb 1800kcal; FR 2000ML        PO intake:   Patient Vitals for the past 72 hrs:   % Diet Eaten   01/25/17 0900 100 %   01/23/17 1800 100 %       Current hospital DM medication: Lantus 16 units, Amaryl 2 mg daily and Humalog for correction, high sensitivity    Will continue to follow as needed.     Thank you  Giovanni Zamarripa, MS, RN, CDE

## 2017-01-26 NOTE — PROGRESS NOTES
Follow up visit with patient in CVICU 34 after the conclusion of IDR. Visit warranted by patient's stated desire for clarity regarding clinical care and interest in dialogue. Mr. Kenneth Rivera heightened state of anxiety appears to be due to his wife, Kingsley Files pressing desire for a clearly defined clinical plan of care and discharge. Mr. RODRIGUES shares a sense of responsibility for the hospitalization and is reactive to his wife's frustration. When offered to provide a forum to air her frustration her responded that she is unlikely to benefit from conversation. Views her is primarily action-oriented in nature, whereas he has a more contemplative temperament. He went on to express aspects of his theological underpinnings that support his spiritual and emotional health even in light of declining physical well-being. Found his explanation to be quite profound.   Provided assurance of prayer and reminded of continued  availability upon request.    [address] [facility],Third Floor  [id number] ([id number])

## 2017-01-26 NOTE — PROGRESS NOTES
Bedside shift change report given to Stephanie Rousseau RN (oncoming nurse) by Rea Rdz RN (offgoing nurse). Report included the following information SBAR, Kardex, Intake/Output, MAR, Recent Results and Cardiac Rhythm NSR.

## 2017-01-26 NOTE — PROGRESS NOTES
[facility]  Progress Note      Date: January 26, 2017     Admit Date: 1/20/2017    Mr. Ileana Strauss is a 61year old male patient admitted who was admitted to [facility] on 1/20 and transferred to [facility] on 1/23 for further evaluation of severe CAD and systolic heart failure. Subjective:     Seen with Dr. Naeem Ro and Dr. Indio Pinedo. Last 24 hours:   Na+ down despite Entresto  Cr up to 2.2 w/TID diuresis  PICC placed, PAC d/c'd    This AM:   Up in chair, feels good, but anxious about plan of care. Current gtts: Milrinone 0.375 mcg/kg/min, MIV      Objective:     Visit Vitals    /66    Pulse 97    Temp 98.6 °F (37 °C)    Resp 17    Ht 5' 9.5\" (1.765 m)    Wt 178 lb 9.2 oz (81 kg)    SpO2 94%    BMI 25.99 kg/m2        Oxygen Therapy:  Oxygen Therapy  O2 Sat (%): 94 % (01/26/17 1000)  Pulse via Oximetry: 97 beats per minute (01/26/17 1000)  O2 Device: Nasal cannula (01/26/17 0800)  O2 Flow Rate (L/min): 2 l/min (01/26/17 0800)    CXR: 01/26/17: LLL effusion, worse than yesterday. Pulmonary edema. Admission Weight: Last Weight   Weight: 178 lb 2.1 oz (80.8 kg) Weight: 178 lb 9.2 oz (81 kg)       Intake / Output / Drain:  Last 24 hrs.:      Intake/Output Summary (Last 24 hours) at 01/26/17 1120  Last data filed at 01/26/17 0907   Gross per 24 hour   Intake          2540.86 ml   Output             3075 ml   Net          -534.14 ml     Total: (-) 11L since admission    EXAM:  Neuro: A&O  Resp: bibasilar crackles and diminished, L>R  CV: NSR 80's. S1/S2. No murmur auscultated. GI: +BS Soft NT/ND  : voiding  Ext: erythematous BLE, R worse than L. Multiple dressings over weeping bullae.  +3 pitting RLE, +2 pitting LLE.       Recent Results (from the past 24 hour(s))   GLUCOSE, POC    Collection Time: 01/25/17 12:13 PM   Result Value Ref Range    Glucose (POC) 276 (H) 65 - 100 mg/dL    Performed by Nhung HARRIS    Collection Time: 01/25/17  2:10 PM   Result Value Ref Range Sodium 122 (L) 136 - 145 mmol/L   GLUCOSE, POC    Collection Time: 01/25/17  6:06 PM   Result Value Ref Range    Glucose (POC) 100 65 - 100 mg/dL    Performed by Jennifer Horan    SODIUM    Collection Time: 01/25/17  9:34 PM   Result Value Ref Range    Sodium 123 (L) 136 - 145 mmol/L   CULTURE, WOUND W GRAM STAIN    Collection Time: 01/25/17  9:34 PM   Result Value Ref Range    Special Requests: NO SPECIAL REQUESTS      GRAM STAIN RARE  WBCS SEEN        GRAM STAIN NO ORGANISMS SEEN      Culture result: PENDING    GLUCOSE, POC    Collection Time: 01/25/17 10:29 PM   Result Value Ref Range    Glucose (POC) 132 (H) 65 - 100 mg/dL    Performed by Rosita Cloud     METABOLIC PANEL, COMPREHENSIVE    Collection Time: 01/26/17  3:45 AM   Result Value Ref Range    Sodium 123 (L) 136 - 145 mmol/L    Potassium 4.1 3.5 - 5.1 mmol/L    Chloride 85 (L) 97 - 108 mmol/L    CO2 32 21 - 32 mmol/L    Anion gap 6 5 - 15 mmol/L    Glucose 122 (H) 65 - 100 mg/dL    BUN 35 (H) 6 - 20 MG/DL    Creatinine 2.23 (H) 0.70 - 1.30 MG/DL    BUN/Creatinine ratio 16 12 - 20      GFR est AA 36 (L) >60 ml/min/1.73m2    GFR est non-AA 30 (L) >60 ml/min/1.73m2    Calcium 8.4 (L) 8.5 - 10.1 MG/DL    Bilirubin, total 0.5 0.2 - 1.0 MG/DL    ALT 8 (L) 12 - 78 U/L    AST 10 (L) 15 - 37 U/L    Alk. phosphatase 77 45 - 117 U/L    Protein, total 6.4 6.4 - 8.2 g/dL    Albumin 2.7 (L) 3.5 - 5.0 g/dL    Globulin 3.7 2.0 - 4.0 g/dL    A-G Ratio 0.7 (L) 1.1 - 2.2     MAGNESIUM    Collection Time: 01/26/17  3:45 AM   Result Value Ref Range    Magnesium 1.9 1.6 - 2.4 mg/dL   CBC W/O DIFF    Collection Time: 01/26/17  3:45 AM   Result Value Ref Range    WBC 6.3 4.1 - 11.1 K/uL    RBC 4.40 4. 10 - 5.70 M/uL    HGB 10.7 (L) 12.1 - 17.0 g/dL    HCT 33.3 (L) 36.6 - 50.3 %    MCV 75.7 (L) 80.0 - 99.0 FL    MCH 24.3 (L) 26.0 - 34.0 PG    MCHC 32.1 30.0 - 36.5 g/dL    RDW 15.8 (H) 11.5 - 14.5 %    PLATELET 844 (L) 699 - 400 K/uL   PTT    Collection Time: 01/26/17  3:45 AM Result Value Ref Range    aPTT 71.4 (H) 22.1 - 32.5 sec    aPTT, therapeutic range     58.0 - 77.0 SECS   PRO-BNP    Collection Time: 01/26/17  3:45 AM   Result Value Ref Range    NT pro-BNP 5150 (H) 0 - 125 PG/ML   GLUCOSE, POC    Collection Time: 01/26/17  4:33 AM   Result Value Ref Range    Glucose (POC) 151 (H) 65 - 100 mg/dL    Performed by Jessica Oliveros     GLUCOSE, POC    Collection Time: 01/26/17  8:04 AM   Result Value Ref Range    Glucose (POC) 108 (H) 65 - 100 mg/dL    Performed by Thalia Samuels*      Current Facility-Administered Medications   Medication Dose Route Frequency    traZODone (DESYREL) tablet 100 mg  100 mg Oral QHS PRN    polyethylene glycol (MIRALAX) packet 17 g  17 g Oral DAILY    amiodarone (CORDARONE) tablet 400 mg  400 mg Oral Q12H    bacitracin 500 unit/gram packet 1 Packet  1 Packet Topical PRN    atorvastatin (LIPITOR) tablet 10 mg  10 mg Oral DAILY    insulin glargine (LANTUS) injection 16 Units  16 Units SubCUTAneous DAILY    glimepiride (AMARYL) tablet 2 mg  2 mg Oral ACB    pantoprazole (PROTONIX) tablet 40 mg  40 mg Oral ACB    ondansetron (ZOFRAN) injection 4 mg  4 mg IntraVENous Q6H PRN    senna-docusate (PERICOLACE) 8.6-50 mg per tablet 1 Tab  1 Tab Oral DAILY    ceFAZolin (ANCEF) 1 g in 0.9% sodium chloride (MBP/ADV) 50 mL  1 g IntraVENous Q8H    0.9% sodium chloride infusion  10 mL/hr IntraVENous CONTINUOUS    sodium chloride (NS) flush 5-10 mL  5-10 mL InterCATHeter Q8H    sodium chloride (NS) flush 5-10 mL  5-10 mL InterCATHeter PRN    acetaminophen (TYLENOL) tablet 650 mg  650 mg Oral Q6H PRN    aspirin delayed-release tablet 81 mg  81 mg Oral DAILY    glucagon (GLUCAGEN) injection 1 mg  1 mg IntraMUSCular PRN    glucose chewable tablet 16 g  4 Tab Oral PRN    insulin lispro (HUMALOG) injection   SubCUTAneous AC&HS    magnesium oxide (MAG-OX) tablet 400 mg  400 mg Oral DAILY    nicotine (NICODERM CQ) 21 mg/24 hr patch 1 Patch  1 Patch TransDERmal Q24H    0.9% sodium chloride infusion  3 mL/hr IntraVENous CONTINUOUS    sodium chloride (NS) flush 5-10 mL  5-10 mL IntraVENous Q8H    sodium chloride (NS) flush 5-10 mL  5-10 mL IntraVENous PRN    albuterol (PROVENTIL VENTOLIN) nebulizer solution 2.5 mg  2.5 mg Nebulization Q4H PRN    heparin 25,000 units in D5W 250 ml infusion  12-25 Units/kg/hr IntraVENous TITRATE    morphine injection 2 mg  2 mg IntraVENous Q4H PRN    diphenhydrAMINE (BENADRYL) capsule 25 mg  25 mg Oral QHS PRN    oxyCODONE-acetaminophen (PERCOCET) 5-325 mg per tablet 2 Tab  2 Tab Oral Q4H PRN    milrinone (PRIMACOR) 20 MG/100 ML D5W infusion  0.375 mcg/kg/min IntraVENous CONTINUOUS    ELECTROLYTE REPLACEMENT PROTOCOL  1 Each Other PRN       ASSESSMENT/PLAN:    ICM, Stage D, NYHA Class IV, EF 7%: Continue IV milrinone at 0.375 mcg/kg/min. Hold diuretics today d/t worsening renal function. No BB d/t hypotension and no ACE-1/ARB d/t hypotension. NT pro-BNP ~5150. Repeat TTE 1/23/17 shows EF 7% with severe diffuse HK, moderate to severe dilation of RV, biatrial enlargement, mild MR, trivial TR, and large left pleural effusion. Continue daily weights, strict I/O, Na+ restricted diet. HF education. Pulmonary hypertension: Continue milrinone. Hyponatremia: Na+ back down to 123 despite Tolvaptan yesterday - repeat. Further dosing per renal. Monitor closely for neurological changes. CAD with LM disease: Prohibitive surgical risk d/t low EF. Continue ASA, heparin gtt, statin. No BB d/t hypotension. Consider cardiac MRI ifEF > 20% - if viability found, will need PFTs, carotids, and ABIs. Smoking cessation counseling in progress. Atrial fibrillation: Transition amiodarone from IV to PO. Continue heparin gtts. DM Type II: A1C 13.5. DTC consulted - increased Lantus to 16 units and added Amaryl 2 mg ACB per recommendations - appreciate input. Continue AccuChecks AC/HS, continue SSI.   Consistent carb diet, continue diabetic education. Thrombocytopenia: Platelets slowly drifting down - 139 today - on ASA, heparin gtt. Monitor. Tobacco abuse: Nicotine patch. Continue smoking cessation education. Constipation: Add Miralax today. Continue docusate. Lower extremity venous stasis disease vs. Cellulitis: Improving on antibiotics. Continue cefazolin 1g q8h per ID - appreciate input. RLE ulcer wound pending. Keep weeping areas clean, dry, and covered. Ppx: Continue pantoprazole for SUP, heparin gtt covers DVT ppx. PICC placed yesterday. Dispo: Remain in CVICU today.   Wife updated with plan of care.          Signed By: Jean Jackson NP    Saw patient, agree with above  Risk of morbidity and mortality - high  Medical decision making - high complexity

## 2017-01-26 NOTE — PROGRESS NOTES
[facility] Progress Note      NAME:  Fernanda Reece. :   1950   MRN:   [id number]   PCP:  None  CARD:   Dr. Albert Brown    Date:  2017     Fernanda Reece. is a 61 y.o. male with a who presented for further evaluation of severe CAD and systolic heart failure. Subjective:   He was initially seen at [facility] 3 years ago and told that he had heart failure with LVEF 30%. He was lost to follow up due to lack of insurance and has not been seen by a physician in the interim. He complains of progressive fatigue, NAVARRO, PND, and edema over the past year, prompting presentation to [facility]. He also complains of lower extremity bullae, weeping, and pain. He denies palpitations, presyncope, syncope, or chest pain. Past 24 hours:  Received tolvaptan 15 mg yesterday  Continues to have edema  Dyspnea has improved  Good appetite  Complains of leg pain      Objective:     Visit Vitals    /63    Pulse 91    Temp 98.6 °F (37 °C)    Resp 18    Ht 5' 9.5\" (1.765 m)    Wt 177 lb 0.5 oz (80.3 kg)    SpO2 98%    BMI 25.77 kg/m2          General:  fatigued    HEENT: Normocephalic, EOMI, PERRLA, Hearing intact, trachea mid-line    Neck:  supple, no significant adenopathy, carotids upstroke normal bilaterally, no bruits    CVP:  12  cm  ( ++ ) HJR    Heart:  Diminished PMI    Normal S1 and S2, S3 gallop    Murmur: 2/6 systolic murmur    Lungs: rales bilaterally    Abdomen: soft, non-tender. Bowel sounds normal. +HSM    Extremity: Warm, red on the right lower extremity with 4+ pitting edema bilaterally    Neuro: Alert and oriented to person, place, and time; normal strength and tone. Normal symmetric reflexes.  Normal coordination and gait      701 -  1900  In: 3516.7 [P.O.:480; I.V.:3036.7]  Out: 3150 [Urine:3150]  O2 Flow Rate (L/min): 2 l/min O2 Device: Nasal cannula  Temp (24hrs), Av.9 °F (36.6 °C), Min:97.2 °F (36.2 °C), Max:98.6 °F (37 °C)    Hemodynamics:  CI 2.3 L/min/m2  PAP 55/25 mmHg  SvO2 53%        Care Plan discussed with:    Comments   Patient x    Family  x    RN x    Care Manager                    Consultant:          Past History:     Past Medical History   Diagnosis Date    Cardiomyopathy ([address] 1/20/2017     A. Echo (1/19/17):  EF 5-10% with severe GHK,. Mildly dil LA. Mild TR. PASP 46. No past surgical history on file. Social History   Substance Use Topics    Smoking status: Not on file    Smokeless tobacco: Not on file    Alcohol use Not on file        No family history on file. Allergies:   No Known Allergies       Data Review:     CXR:   CXR Results  (Last 48 hours)               01/25/17 0445  XR CHEST PORT Final result    Impression:  IMPRESSION:    Central pulmonary vascular congestive change. Left pleural effusion and basilar atelectasis/airspace disease. Narrative:  Clinical history: Systolic heart failure       INDICATION:  Heart failure. COMPARISON:  1/24/2017. FINDINGS:     An AP portable view was obtained of the chest.     The right jugular [place]-Val catheter remains in place. The heart is minimally enlarged     There is central pulmonary vascular congestive change. Persistent opacification of the left lung base by a left pleural effusion and   airspace disease/atelectasis is seen             01/24/17 0423  XR CHEST PORT Final result    Impression:  IMPRESSION:    Central pulmonary vascular congestive change. Left pleural effusion and basilar atelectasis/airspace disease. Narrative:  INDICATION:  PAC. COMPARISON:  1/23/2017. FINDINGS:     An AP portable view was obtained of the chest.     The right jugular [place]-Val catheter remains in place. The heart is minimally enlarged     There is central pulmonary vascular congestive change.    Persistent opacification of the left lung base by a left pleural effusion and   airspace disease/atelectasis is seen                     Echocardiogram:   Echo Results  (Last 48 hours)    None          No results found for this visit on 01/20/17. ECG:  EKG: ST with occasional PVC, NSIVCD, LAE    LABS:  Recent Results (from the past 24 hour(s))   POC G3 - PUL    Collection Time: 01/25/17  4:20 AM   Result Value Ref Range    FIO2 (POC) 28 %    pH (POC) 7.426 7.35 - 7.45      pCO2 (POC) 41.0 35.0 - 45.0 MMHG    pO2 (POC) 29 (LL) 80 - 100 MMHG    HCO3 (POC) 27.0 (H) 22 - 26 MMOL/L    sO2 (POC) 57 (L) 92 - 97 %    Base excess (POC) 3 mmol/L    Site SWAN TAVIA      Device: NASAL CANNULA      Flow rate (POC) 2.0 L/M    Allens test (POC) N/A      Specimen type (POC) ARTERIAL      Total resp. rate 15     METABOLIC PANEL, COMPREHENSIVE    Collection Time: 01/25/17  4:34 AM   Result Value Ref Range    Sodium 126 (L) 136 - 145 mmol/L    Potassium 4.1 3.5 - 5.1 mmol/L    Chloride 87 (L) 97 - 108 mmol/L    CO2 30 21 - 32 mmol/L    Anion gap 9 5 - 15 mmol/L    Glucose 201 (H) 65 - 100 mg/dL    BUN 30 (H) 6 - 20 MG/DL    Creatinine 1.86 (H) 0.70 - 1.30 MG/DL    BUN/Creatinine ratio 16 12 - 20      GFR est AA 45 (L) >60 ml/min/1.73m2    GFR est non-AA 37 (L) >60 ml/min/1.73m2    Calcium 8.5 8.5 - 10.1 MG/DL    Bilirubin, total 0.6 0.2 - 1.0 MG/DL    ALT 10 (L) 12 - 78 U/L    AST 11 (L) 15 - 37 U/L    Alk.  phosphatase 83 45 - 117 U/L    Protein, total 6.4 6.4 - 8.2 g/dL    Albumin 2.6 (L) 3.5 - 5.0 g/dL    Globulin 3.8 2.0 - 4.0 g/dL    A-G Ratio 0.7 (L) 1.1 - 2.2     MAGNESIUM    Collection Time: 01/25/17  4:34 AM   Result Value Ref Range    Magnesium 2.2 1.6 - 2.4 mg/dL   CBC W/O DIFF    Collection Time: 01/25/17  4:34 AM   Result Value Ref Range    WBC 6.2 4.1 - 11.1 K/uL    RBC 4.65 4.10 - 5.70 M/uL    HGB 11.3 (L) 12.1 - 17.0 g/dL    HCT 34.9 (L) 36.6 - 50.3 %    MCV 75.1 (L) 80.0 - 99.0 FL    MCH 24.3 (L) 26.0 - 34.0 PG    MCHC 32.4 30.0 - 36.5 g/dL    RDW 15.7 (H) 11.5 - 14.5 %    PLATELET 107 (L) 357 - 400 K/uL   PTT    Collection Time: 01/25/17  4:34 AM   Result Value Ref Range    aPTT 70.0 (H) 22.1 - 32.5 sec    aPTT, therapeutic range     58.0 - 77.0 SECS   PRO-BNP    Collection Time: 01/25/17  4:34 AM   Result Value Ref Range    NT pro-BNP 5308 (H) 0 - 125 PG/ML   GLUCOSE, POC    Collection Time: 01/25/17  8:32 AM   Result Value Ref Range    Glucose (POC) 280 (H) 65 - 100 mg/dL    Performed by Nicky Velez    PTT    Collection Time: 01/25/17 10:18 AM   Result Value Ref Range    aPTT 60.2 (H) 22.1 - 32.5 sec    aPTT, therapeutic range     58.0 - 77.0 SECS   GLUCOSE, POC    Collection Time: 01/25/17 12:13 PM   Result Value Ref Range    Glucose (POC) 276 (H) 65 - 100 mg/dL    Performed by Almon Hodgkins    SODIUM    Collection Time: 01/25/17  2:10 PM   Result Value Ref Range    Sodium 122 (L) 136 - 145 mmol/L   GLUCOSE, POC    Collection Time: 01/25/17  6:06 PM   Result Value Ref Range    Glucose (POC) 100 65 - 100 mg/dL    Performed by Tiffanie Swenson          Medications reviewed:    Current Facility-Administered Medications   Medication Dose Route Frequency    bumetanide (BUMEX) injection 2 mg  2 mg IntraVENous TID    bacitracin 500 unit/gram packet 1 Packet  1 Packet Topical PRN    atorvastatin (LIPITOR) tablet 10 mg  10 mg Oral DAILY    insulin glargine (LANTUS) injection 16 Units  16 Units SubCUTAneous DAILY    glimepiride (AMARYL) tablet 2 mg  2 mg Oral ACB    pantoprazole (PROTONIX) tablet 40 mg  40 mg Oral ACB    ondansetron (ZOFRAN) injection 4 mg  4 mg IntraVENous Q6H PRN    amiodarone (CORDARONE) 450 mg in dextrose 5% 250 mL infusion  0.5-1 mg/min IntraVENous TITRATE    traZODone (DESYREL) tablet 50 mg  50 mg Oral QHS PRN    senna-docusate (PERICOLACE) 8.6-50 mg per tablet 1 Tab  1 Tab Oral DAILY    ceFAZolin (ANCEF) 1 g in 0.9% sodium chloride (MBP/ADV) 50 mL  1 g IntraVENous Q8H    0.9% sodium chloride infusion  10 mL/hr IntraVENous CONTINUOUS    vasopressin (VASOSTRICT) 20 Units in 0.9% sodium chloride 100 mL infusion  0.01-0.04 Units/min IntraVENous TITRATE    sodium chloride (NS) flush 5-10 mL  5-10 mL InterCATHeter Q8H    sodium chloride (NS) flush 5-10 mL  5-10 mL InterCATHeter PRN    acetaminophen (TYLENOL) tablet 650 mg  650 mg Oral Q6H PRN    aspirin delayed-release tablet 81 mg  81 mg Oral DAILY    glucagon (GLUCAGEN) injection 1 mg  1 mg IntraMUSCular PRN    glucose chewable tablet 16 g  4 Tab Oral PRN    insulin lispro (HUMALOG) injection   SubCUTAneous AC&HS    magnesium oxide (MAG-OX) tablet 400 mg  400 mg Oral DAILY    nicotine (NICODERM CQ) 21 mg/24 hr patch 1 Patch  1 Patch TransDERmal Q24H    0.9% sodium chloride infusion  9 mL/hr IntraVENous CONTINUOUS    sodium chloride (NS) flush 5-10 mL  5-10 mL IntraVENous Q8H    sodium chloride (NS) flush 5-10 mL  5-10 mL IntraVENous PRN    albuterol (PROVENTIL VENTOLIN) nebulizer solution 2.5 mg  2.5 mg Nebulization Q4H PRN    heparin 25,000 units in D5W 250 ml infusion  12-25 Units/kg/hr IntraVENous TITRATE    morphine injection 2 mg  2 mg IntraVENous Q4H PRN    diphenhydrAMINE (BENADRYL) capsule 25 mg  25 mg Oral QHS PRN    oxyCODONE-acetaminophen (PERCOCET) 5-325 mg per tablet 2 Tab  2 Tab Oral Q4H PRN    milrinone (PRIMACOR) 20 MG/100 ML D5W infusion  0.375 mcg/kg/min IntraVENous CONTINUOUS    ELECTROLYTE REPLACEMENT PROTOCOL  1 Each Other PRN           IMPRESSION:   1. Acute on chronic systolic heart failure (ICM) - Stage D, NYHA Class IV  2. Severe native coronary artery disease  3. Diabetes Mellitus, Hga1c 13.5  4. History of tobacco abuse  5. Cellulitis  6. CHACORTA on CKD3  7. Pulmonary HTN  8. Persistent atrial fibrillation  9. Hyponatremia       PLAN:   1. Continue IV milrinone 0.3 mcg/kg/min, change IV bumex to 2 mg bid , Follow I/O, Replete electrolytes, Hold ACE-I due to CHACORTA on CKD. Hold BB due to hypotension. Redose Tolvaptan  2. Remove Karlynn Jeison catheter  3.  Continue ASA, statin, low dose BB; too high risk for CABG d/t low LVEF and diabetes out of control  4. Start lantus and sliding scale insulin, accuchecks, diabetic education  5. Schedule PFTs, carotid ultrasound, ABIs to further assess risk for surgical revascularization  6. IV cefazolin 1 gram q 8 hours  7. Smoking cessation counseling, nicotine patch  8. IV heparin infusion and IV amiodarone infusion           Thank you for letting me see him with you,    Roberta C. Lolly Schirmer, MD, [place] [address] Director  Jennifer Roman [address] [place] [address], [address] [place] [address]  Office: [phone]  Fax: [phone]  24 hour VAD/HF Pager: [phone]

## 2017-01-26 NOTE — PROGRESS NOTES
Problem: Heart Failure: Day 5  Goal: Off Pathway (Use only if patient is Off Pathway)  Outcome: Progressing Towards Goal  Discussed Dietary restrictions and choices. Also discussed Lifestyle modifications and activity pacing.

## 2017-01-26 NOTE — PROGRESS NOTES
Problem: Mobility Impaired (Adult and Pediatric)  Goal: *Acute Goals and Plan of Care (Insert Text)  Physical Therapy Goals  Initiated 1/21/2017  1. Patient will move from supine to sit and sit to supine in bed with modified independence within 7 day(s). 2. Patient will transfer from bed to chair and chair to bed with modified independence using the least restrictive device within 7 day(s). 3. Patient will perform sit to stand with modified independence within 7 day(s). 4. Patient will ambulate with modified independence for 200 feet with the least restrictive device within 7 day(s). 5. Patient will ascend/descend 4 stairs with 0 handrail(s) with modified independence within 7 day(s). PHYSICAL THERAPY TREATMENT  Patient: Pat Lane. (64 y.o. male)  Date: 1/26/2017  Diagnosis: CAD  Systolic heart failure ([facility] [address] <principal problem not specified>       Precautions:        ASSESSMENT:  Chart reviewed. Pt deemed appropriate for treatment by nursing staff. Pt continues to progress towards functional physical therapy goals. [place] removed and patient able to ambulate on the unit. Pt amb x 220 feet, CGA/SBA without device. Some scissoring noted, however no episodes of LOB. Educated on slowing down and widening MESERET to decrease fall risk with good reception and results. Pt demonstrating directional changes and obstacle negotiation without assist. Pt scored a 25/28 on Tinetti balance, indicating low fall risk. Recommend home discharge without need for further services at this time. Nursing and  notified. Discussed discharge disposition with OT. OT considered a rehab placement upon evaluation when patient was limited by the Cassidy Merino, however agrees that patient is too high level at this time and recommends a home discharge.       Progression toward goals:  [X]    Improving appropriately and progressing toward goals  [ ]    Improving slowly and progressing toward goals  [ ]    Not making progress toward goals and plan of care will be adjusted       PLAN:  Patient continues to benefit from skilled intervention to address the above impairments. Continue treatment per established plan of care. Discharge Recommendations:  None  Further Equipment Recommendations for Discharge:  none       SUBJECTIVE:   Patient stated I only fall if the ladder comes out from under me.       OBJECTIVE DATA SUMMARY:   Critical Behavior:  Neurologic State: Alert, Appropriate for age  Orientation Level: Oriented X4  Cognition: Appropriate decision making, Appropriate for age attention/concentration, Appropriate safety awareness  Safety/Judgement: Awareness of environment  Functional Mobility Training:     Transfers:  Sit to Stand: Stand-by asssistance  Stand to Sit: Stand-by asssistance  Bed to Chair: Stand-by asssistance  Balance:  Sitting: Intact  Standing - Static: Good  Standing - Dynamic : Good  Ambulation/Gait Training:  Distance (ft): 220 Feet (ft)  Ambulation - Level of Assistance: Contact guard assistance;Stand-by asssistance  Base of Support: Narrowed  Neuro Re-Education:  Tinetti test:      Sitting Balance: 1  Arises: 2  Attempts to Rise: 2  Immediate Standing Balance: 2  Standing Balance: 2  Nudged: 2  Eyes Closed: 1  Turn 360 Degrees - Continuous/Discontinuous: 1  Turn 360 Degrees - Steady/Unsteady: 1  Sitting Down: 1  Balance Score: 15  Indication of Gait: 1  R Step Length/Height: 1  L Step Length/Height: 1  R Foot Clearance: 1  L Foot Clearance: 1  Step Symmetry: 1  Step Continuity: 1  Path: 1  Trunk: 1  Walking Time: 1  Gait Score: 10  Total Score: 25         Tinetti Test and G-code impairment scale:  Percentage of Impairment CH     0%    CI     1-19% CJ     20-39% CK     40-59% CL     60-79% CM     80-99% CN      100%   Tinetti  Score 0-28 28 23-27 17-22 12-16 6-11 1-5 0          Tinetti Tool Score Risk of Falls  <19 = High Fall Risk  19-24 = Moderate Fall Risk  25-28 = Low Fall Risk  Tinetti ME.  Performance-Oriented Assessment of Mobility Problems in Elderly Patients. [facility] 66; [id number]. (Scoring Description: PT Bulletin Feb. 10, 1993)     Older adults: Karis Kaba et al, 2009; n = 1000 [facility] elderly evaluated with ABC, YUSRA, ADL, and IADL)  · Mean YUSRA score for males aged 69-68 years = 26.21(3.40)  · Mean YUSRA score for females age 69-68 years = 25.16(4.30)  · Mean YUSRA score for males over 80 years = 23.29(6.02)  · Mean YUSRA score for females over 80 years = 17.20(8.32)         Pain:  Pain Scale 1: Numeric (0 - 10)  Pain Intensity 1: 3  Pain Location 1: Leg  Pain Orientation 1: Left;Right  Pain Description 1: Constant  Pain Intervention(s) 1: Medication (see MAR)  Activity Tolerance: On 2L O2. Will need to wean to RA prior to discharge. Please refer to the flowsheet for vital signs taken during this treatment.   After treatment:   [X]    Patient left in no apparent distress sitting up in chair  [ ]    Patient left in no apparent distress in bed  [X]    Call bell left within reach  [X]    Nursing notified  [ ]    Caregiver present  [ ]    Bed alarm activated      COMMUNICATION/COLLABORATION:   The patients plan of care was discussed with: Registered Nurse     Jarrett Lino PT, DPT   Time Calculation: 14 mins

## 2017-01-26 NOTE — PROGRESS NOTES
Cardiac Surgery Care Coordinator- Met with Fernanda Doe. Reviewed plan of care, he has an excellent understanding of his plan of care. Using the teachback method reviewed heart failure diagnosis. He understands he has heart failure and the importance of following a low sodium diet. Discussed daily weights,  he is able to verbalize the importance of daily weight monitoring and the potential ramifications of untreated weight gain. Cardiac Wellness nurse at the bedside to review HF education. Will continue to follow and update.  Marily Yee RN

## 2017-01-27 LAB
ALBUMIN SERPL BCP-MCNC: 2.8 G/DL (ref 3.5–5)
ALBUMIN/GLOB SERPL: 0.7 {RATIO} (ref 1.1–2.2)
ALP SERPL-CCNC: 85 U/L (ref 45–117)
ALT SERPL-CCNC: 6 U/L (ref 12–78)
ANION GAP BLD CALC-SCNC: 7 MMOL/L (ref 5–15)
APTT PPP: 57.4 SEC (ref 22.1–32.5)
APTT PPP: 59.6 SEC (ref 22.1–32.5)
AST SERPL W P-5'-P-CCNC: 15 U/L (ref 15–37)
BACTERIA SPEC CULT: NORMAL
BACTERIA SPEC CULT: NORMAL
BILIRUB SERPL-MCNC: 0.5 MG/DL (ref 0.2–1)
BNP SERPL-MCNC: 5427 PG/ML (ref 0–125)
BUN SERPL-MCNC: 34 MG/DL (ref 6–20)
BUN/CREAT SERPL: 19 (ref 12–20)
CALCIUM SERPL-MCNC: 8.9 MG/DL (ref 8.5–10.1)
CHLORIDE SERPL-SCNC: 91 MMOL/L (ref 97–108)
CO2 SERPL-SCNC: 33 MMOL/L (ref 21–32)
CREAT SERPL-MCNC: 1.83 MG/DL (ref 0.7–1.3)
ERYTHROCYTE [DISTWIDTH] IN BLOOD BY AUTOMATED COUNT: 15.8 % (ref 11.5–14.5)
GLOBULIN SER CALC-MCNC: 3.9 G/DL (ref 2–4)
GLUCOSE BLD STRIP.AUTO-MCNC: 103 MG/DL (ref 65–100)
GLUCOSE BLD STRIP.AUTO-MCNC: 121 MG/DL (ref 65–100)
GLUCOSE BLD STRIP.AUTO-MCNC: 72 MG/DL (ref 65–100)
GLUCOSE BLD STRIP.AUTO-MCNC: 83 MG/DL (ref 65–100)
GLUCOSE SERPL-MCNC: 74 MG/DL (ref 65–100)
GRAM STN SPEC: NORMAL
GRAM STN SPEC: NORMAL
HCT VFR BLD AUTO: 33.9 % (ref 36.6–50.3)
HGB BLD-MCNC: 10.8 G/DL (ref 12.1–17)
MAGNESIUM SERPL-MCNC: 2 MG/DL (ref 1.6–2.4)
MCH RBC QN AUTO: 24.4 PG (ref 26–34)
MCHC RBC AUTO-ENTMCNC: 31.9 G/DL (ref 30–36.5)
MCV RBC AUTO: 76.5 FL (ref 80–99)
PLATELET # BLD AUTO: 142 K/UL (ref 150–400)
POTASSIUM SERPL-SCNC: 4.1 MMOL/L (ref 3.5–5.1)
PROT SERPL-MCNC: 6.7 G/DL (ref 6.4–8.2)
RBC # BLD AUTO: 4.43 M/UL (ref 4.1–5.7)
SERVICE CMNT-IMP: ABNORMAL
SERVICE CMNT-IMP: ABNORMAL
SERVICE CMNT-IMP: NORMAL
SODIUM SERPL-SCNC: 131 MMOL/L (ref 136–145)
THERAPEUTIC RANGE,PTTT: ABNORMAL SECS (ref 58–77)
THERAPEUTIC RANGE,PTTT: ABNORMAL SECS (ref 58–77)
WBC # BLD AUTO: 8 K/UL (ref 4.1–11.1)

## 2017-01-27 PROCEDURE — 74011000258 HC RX REV CODE- 258: Performed by: INTERNAL MEDICINE

## 2017-01-27 PROCEDURE — 65610000003 HC RM ICU SURGICAL

## 2017-01-27 PROCEDURE — 85027 COMPLETE CBC AUTOMATED: CPT | Performed by: NURSE PRACTITIONER

## 2017-01-27 PROCEDURE — 83880 ASSAY OF NATRIURETIC PEPTIDE: CPT | Performed by: NURSE PRACTITIONER

## 2017-01-27 PROCEDURE — 74011250637 HC RX REV CODE- 250/637: Performed by: THORACIC SURGERY (CARDIOTHORACIC VASCULAR SURGERY)

## 2017-01-27 PROCEDURE — 74011636637 HC RX REV CODE- 636/637: Performed by: NURSE PRACTITIONER

## 2017-01-27 PROCEDURE — 85730 THROMBOPLASTIN TIME PARTIAL: CPT | Performed by: THORACIC SURGERY (CARDIOTHORACIC VASCULAR SURGERY)

## 2017-01-27 PROCEDURE — 36415 COLL VENOUS BLD VENIPUNCTURE: CPT | Performed by: NURSE PRACTITIONER

## 2017-01-27 PROCEDURE — 74011250636 HC RX REV CODE- 250/636: Performed by: THORACIC SURGERY (CARDIOTHORACIC VASCULAR SURGERY)

## 2017-01-27 PROCEDURE — 74011250636 HC RX REV CODE- 250/636: Performed by: NURSE PRACTITIONER

## 2017-01-27 PROCEDURE — 85730 THROMBOPLASTIN TIME PARTIAL: CPT | Performed by: NURSE PRACTITIONER

## 2017-01-27 PROCEDURE — 36592 COLLECT BLOOD FROM PICC: CPT

## 2017-01-27 PROCEDURE — 80053 COMPREHEN METABOLIC PANEL: CPT | Performed by: NURSE PRACTITIONER

## 2017-01-27 PROCEDURE — 82962 GLUCOSE BLOOD TEST: CPT

## 2017-01-27 PROCEDURE — 83735 ASSAY OF MAGNESIUM: CPT | Performed by: NURSE PRACTITIONER

## 2017-01-27 PROCEDURE — 74011250636 HC RX REV CODE- 250/636: Performed by: INTERNAL MEDICINE

## 2017-01-27 PROCEDURE — 97530 THERAPEUTIC ACTIVITIES: CPT

## 2017-01-27 PROCEDURE — 93306 TTE W/DOPPLER COMPLETE: CPT

## 2017-01-27 PROCEDURE — 77010033678 HC OXYGEN DAILY

## 2017-01-27 PROCEDURE — 74011250637 HC RX REV CODE- 250/637: Performed by: NURSE PRACTITIONER

## 2017-01-27 RX ORDER — CARVEDILOL 3.12 MG/1
3.12 TABLET ORAL 2 TIMES DAILY WITH MEALS
Status: DISCONTINUED | OUTPATIENT
Start: 2017-01-27 | End: 2017-02-22 | Stop reason: HOSPADM

## 2017-01-27 RX ORDER — AMIODARONE HYDROCHLORIDE 200 MG/1
200 TABLET ORAL EVERY 12 HOURS
Status: DISCONTINUED | OUTPATIENT
Start: 2017-01-27 | End: 2017-02-22 | Stop reason: HOSPADM

## 2017-01-27 RX ADMIN — OXYCODONE HYDROCHLORIDE AND ACETAMINOPHEN 2 TABLET: 5; 325 TABLET ORAL at 09:08

## 2017-01-27 RX ADMIN — AMIODARONE HYDROCHLORIDE 400 MG: 200 TABLET ORAL at 09:02

## 2017-01-27 RX ADMIN — DIPHENHYDRAMINE HYDROCHLORIDE 25 MG: 25 CAPSULE ORAL at 09:02

## 2017-01-27 RX ADMIN — Medication 10 ML: at 15:08

## 2017-01-27 RX ADMIN — Medication 10 ML: at 06:20

## 2017-01-27 RX ADMIN — CEFAZOLIN SODIUM 1 G: 1 INJECTION, POWDER, FOR SOLUTION INTRAMUSCULAR; INTRAVENOUS at 15:08

## 2017-01-27 RX ADMIN — INSULIN GLARGINE 16 UNITS: 100 INJECTION, SOLUTION SUBCUTANEOUS at 09:03

## 2017-01-27 RX ADMIN — HEPARIN SODIUM AND DEXTROSE 25 UNITS/KG/HR: 10000; 5 INJECTION INTRAVENOUS at 20:39

## 2017-01-27 RX ADMIN — MILRINONE LACTATE 0.3 MCG/KG/MIN: 200 INJECTION, SOLUTION INTRAVENOUS at 12:42

## 2017-01-27 RX ADMIN — AMIODARONE HYDROCHLORIDE 200 MG: 200 TABLET ORAL at 20:18

## 2017-01-27 RX ADMIN — Medication 10 ML: at 06:15

## 2017-01-27 RX ADMIN — CARVEDILOL 3.12 MG: 3.12 TABLET, FILM COATED ORAL at 18:42

## 2017-01-27 RX ADMIN — SODIUM CHLORIDE 10 ML/HR: 900 INJECTION, SOLUTION INTRAVENOUS at 20:39

## 2017-01-27 RX ADMIN — Medication 20 ML: at 04:01

## 2017-01-27 RX ADMIN — POLYETHYLENE GLYCOL 3350 17 G: 17 POWDER, FOR SOLUTION ORAL at 09:03

## 2017-01-27 RX ADMIN — ATORVASTATIN CALCIUM 10 MG: 10 TABLET, FILM COATED ORAL at 09:02

## 2017-01-27 RX ADMIN — Medication 400 MG: at 09:02

## 2017-01-27 RX ADMIN — CEFAZOLIN SODIUM 1 G: 1 INJECTION, POWDER, FOR SOLUTION INTRAMUSCULAR; INTRAVENOUS at 06:03

## 2017-01-27 RX ADMIN — OXYCODONE HYDROCHLORIDE AND ACETAMINOPHEN 2 TABLET: 5; 325 TABLET ORAL at 20:18

## 2017-01-27 RX ADMIN — GLIMEPIRIDE 2 MG: 2 TABLET ORAL at 07:06

## 2017-01-27 RX ADMIN — PANTOPRAZOLE SODIUM 40 MG: 40 TABLET, DELAYED RELEASE ORAL at 07:06

## 2017-01-27 RX ADMIN — HEPARIN SODIUM AND DEXTROSE 25 UNITS/KG/HR: 10000; 5 INJECTION INTRAVENOUS at 10:46

## 2017-01-27 RX ADMIN — DOCUSATE SODIUM AND SENNOSIDES 1 TABLET: 8.6; 5 TABLET, FILM COATED ORAL at 09:02

## 2017-01-27 RX ADMIN — Medication 81 MG: at 09:02

## 2017-01-27 RX ADMIN — MILRINONE LACTATE 0.3 MCG/KG/MIN: 200 INJECTION, SOLUTION INTRAVENOUS at 20:40

## 2017-01-27 NOTE — PROGRESS NOTES
0800 - Bedside report received from Trinity Skinner RN.   0900 - PRN benadryl given for L eye irritation, runny nose. PRN percocet given for bilat LE pain. 0945 - Milrinone decreased to 0.3 mcg/kg/min per Arelis Welch NP.   1200 - ECHO completed. 2030 - Bedside shift change report given to Mick Plascencia RN (oncoming nurse) by Meghan Santacruz RN (offgoing nurse). Report included the following information SBAR, Kardex, Intake/Output, MAR, Recent Results and Cardiac Rhythm NSR.

## 2017-01-27 NOTE — PROGRESS NOTES
Problem: Self Care Deficits Care Plan (Adult)  Goal: *Acute Goals and Plan of Care (Insert Text)  Occupational Therapy Goals  Initiated 1/23/2017  1. Patient will perform lower body dressing with modified independence within 7 day(s). 2. Patient will perform standing ADLs 10 mins with supervision/set-up within 7 day(s). 3. Patient will perform bathing with supervision/set-up within 7 day(s). 4. Patient will perform PRN BSC / toilet transfers with supervision/set-up within 7 day(s). 5. Patient will perform all aspects of toileting with supervision/set-up within 7 day(s). 6. Patient will participate in upper extremity therapeutic exercise/activities with medium resistance band supervision/set-up for 5 minutes within 7 day(s). OCCUPATIONAL THERAPY TREATMENT/DISCHARGE  Patient: José Miguel Juárez (64 y.o. male)  Date: 1/27/2017  Diagnosis: CAD  Systolic heart failure ([address] <principal problem not specified>       Precautions:        ASSESSMENT:  Patient progressing with all basic ADLs is modified independence with functional mobility and ADLs. Patient to discharge home with wife A PRN for instrumental ADLs. Education completed. Progression toward goals:  [X]            Improving appropriately and progressing toward goals  [ ]            Improving slowly and progressing toward goals  [ ]            Not making progress toward goals and plan of care will be adjusted       PLAN:  Patient will be discharged from occupational therapy at this time. Rationale for discharge:  [X]   Goals Achieved  [ ]   Monserrat Gomez  [ ]   Patient not participating in therapy  [ ]   Other:  Discharge Recommendations:  None  Further Equipment Recommendations for Discharge:  None noted       SUBJECTIVE:   Patient stated Sure let's go.       OBJECTIVE DATA SUMMARY:   Cognitive/Behavioral Status:  Neurologic State: Appropriate for age  Orientation Level: Oriented X4  Cognition: Appropriate decision making; Appropriate for age attention/concentration; Appropriate safety awareness; Follows commands              Functional Mobility and Transfers for ADLs:  Bed Mobility:  Supine to Sit: Modified independent  Scooting: Modified independent     Transfers:  Sit to Stand: Modified independent  Bed to Chair: Modified independent     Balance:  Sitting: Intact; Without support  Standing: Without support; Intact     ADL Intervention:     Patient demonstrated standing 5 mins to groom, reach in cabinets high and low as mock for gathering ADL objects. Grooming  Brushing Teeth: Modified independent  Patient stating he is now completing all ADLs on own/with nursing supervision s/p [place] removal.                     Lower Body Dressing Assistance  Socks: Modified independent  Leg Crossed Method Used: Yes  Position Performed: Seated in chair       Patient instructed and indicated understanding energy conservation techniques to increase independence and safety during ADLs with visual handout provided. Neuro Re-Education:           Therapeutic Exercises:   Patient instructed and demonstrated standing 10 reps 1 set flexion, abduction and then same with 1 lb 5 mins standing with modified independence. Encouragement to complete daily to decrease O2 supplemental use, maintain endurance for ADLs. Pain:  Pain Scale 1: Numeric (0 - 10)  Pain Intensity 1: 0  Pain Location 1: Leg  Pain Orientation 1: Left;Right  Pain Description 1: Aching  Pain Intervention(s) 1: Medication (see MAR)  Activity Tolerance:   2L RR 16-21, HR 96,   Please refer to the flowsheet for vital signs taken during this treatment.   After treatment:   [X] Patient left in no apparent distress sitting up in chair  [ ] Patient left in no apparent distress in bed  [X] Call bell left within reach  [X] Nursing notified  [ ] Caregiver present  [ ] Bed alarm activated      COMMUNICATION/COLLABORATION:   The patients plan of care was discussed with: Physical Therapist and Registered Nurse Florecita Pack  Time Calculation: 18 mins

## 2017-01-27 NOTE — PROGRESS NOTES
[facility]  Progress Note      Date: January 27, 2017     Admit Date: 1/20/2017    Mr. Ashley Kc is a 61year old male patient admitted who was admitted to [facility] on 1/20 and transferred to [facility] on 1/23 for further evaluation of severe CAD and systolic heart failure. Subjective:     Seen with Dr. Man Keyes and Dr. Anshu Cortez. Last 24 hours:    Thoracentesis - 1.2L   Na+ up with Tolvaptan  Cr improved (1.8) - diuretics held   RLE wound cx pending     This AM:   In bed, c/o rhinorrhea. Legs feel better. Current gtts: Milrinone 0.375 mcg/kg/min, MIV      Objective:     Visit Vitals    /59    Pulse 98    Temp 97.7 °F (36.5 °C)    Resp 17    Ht 5' 9.5\" (1.765 m)    Wt 177 lb 7.5 oz (80.5 kg)    SpO2 97%    BMI 25.83 kg/m2        Oxygen Therapy:  Oxygen Therapy  O2 Sat (%): 97 % (01/27/17 1000)  Pulse via Oximetry: 98 beats per minute (01/27/17 1000)  O2 Device: Nasal cannula (01/27/17 0700)  O2 Flow Rate (L/min): 2 l/min (01/27/17 0700)    CXR: 01/27/17: LLL effusion, worse than yesterday. Pulmonary edema. Admission Weight: Last Weight   Weight: 178 lb 2.1 oz (80.8 kg) Weight: 177 lb 7.5 oz (80.5 kg)       Intake / Output / Drain:  Last 24 hrs.:      Intake/Output Summary (Last 24 hours) at 01/27/17 1124  Last data filed at 01/27/17 0900   Gross per 24 hour   Intake          1679.03 ml   Output             3100 ml   Net         -1420.97 ml     Total: (-) 12L since admission    EXAM:  Neuro: A&O  Resp: improved  CV: 's. S1/S2. GI: +BS Soft NT/ND  : voiding  Ext: erythematous BLE, skin flaky, peeling. R worse than L. Multiple dressings over weeping bullae.  +3 pitting RLE, +2 pitting LLE.       Recent Results (from the past 24 hour(s))   OSMOLALITY, UR    Collection Time: 01/26/17 12:07 PM   Result Value Ref Range    Osmolality,urine 165 MOSM/kg H2O   SODIUM    Collection Time: 01/26/17 12:07 PM   Result Value Ref Range    Sodium 124 (L) 136 - 145 mmol/L   GLUCOSE, POC    Collection Time: 01/26/17 12:16 PM   Result Value Ref Range    Glucose (POC) 160 (H) 65 - 100 mg/dL    Performed by [address] [place] [address], BODY FLUID [address]    Collection Time: 01/26/17  3:05 PM   Result Value Ref Range    Special Requests: NO SPECIAL REQUESTS      GRAM STAIN OCCASIONAL  WBCS SEEN        GRAM STAIN NO ORGANISMS SEEN      Culture result: PENDING    PROTEIN TOTAL, FLUID    Collection Time: 01/26/17  3:05 PM   Result Value Ref Range    Fluid Type: THORCENFLD     Protein total, body fld.  2.4 g/dL   GLUCOSE, POC    Collection Time: 01/26/17  4:10 PM   Result Value Ref Range    Glucose (POC) 130 (H) 65 - 100 mg/dL    Performed by Thomas Pickett    PTT    Collection Time: 01/26/17  8:34 PM   Result Value Ref Range    aPTT 47.9 (H) 22.1 - 32.5 sec    aPTT, therapeutic range     58.0 - 77.0 SECS   SODIUM    Collection Time: 01/26/17  8:34 PM   Result Value Ref Range    Sodium 126 (L) 136 - 145 mmol/L   GLUCOSE, POC    Collection Time: 01/26/17  9:14 PM   Result Value Ref Range    Glucose (POC) 85 65 - 100 mg/dL    Performed by Merna Og    CBC W/O DIFF    Collection Time: 01/27/17  3:56 AM   Result Value Ref Range    WBC 8.0 4.1 - 11.1 K/uL    RBC 4.43 4.10 - 5.70 M/uL    HGB 10.8 (L) 12.1 - 17.0 g/dL    HCT 33.9 (L) 36.6 - 50.3 %    MCV 76.5 (L) 80.0 - 99.0 FL    MCH 24.4 (L) 26.0 - 34.0 PG    MCHC 31.9 30.0 - 36.5 g/dL    RDW 15.8 (H) 11.5 - 14.5 %    PLATELET 215 (L) 993 - 400 K/uL   MAGNESIUM    Collection Time: 01/27/17  3:56 AM   Result Value Ref Range    Magnesium 2.0 1.6 - 2.4 mg/dL   METABOLIC PANEL, COMPREHENSIVE    Collection Time: 01/27/17  3:56 AM   Result Value Ref Range    Sodium 131 (L) 136 - 145 mmol/L    Potassium 4.1 3.5 - 5.1 mmol/L    Chloride 91 (L) 97 - 108 mmol/L    CO2 33 (H) 21 - 32 mmol/L    Anion gap 7 5 - 15 mmol/L    Glucose 74 65 - 100 mg/dL    BUN 34 (H) 6 - 20 MG/DL    Creatinine 1.83 (H) 0.70 - 1.30 MG/DL    BUN/Creatinine ratio 19 12 - 20      GFR est AA 45 (L) >60 ml/min/1.73m2    GFR est non-AA 37 (L) >60 ml/min/1.73m2    Calcium 8.9 8.5 - 10.1 MG/DL    Bilirubin, total 0.5 0.2 - 1.0 MG/DL    ALT 6 (L) 12 - 78 U/L    AST 15 15 - 37 U/L    Alk.  phosphatase 85 45 - 117 U/L    Protein, total 6.7 6.4 - 8.2 g/dL    Albumin 2.8 (L) 3.5 - 5.0 g/dL    Globulin 3.9 2.0 - 4.0 g/dL    A-G Ratio 0.7 (L) 1.1 - 2.2     PTT    Collection Time: 01/27/17  3:56 AM   Result Value Ref Range    aPTT 57.4 (H) 22.1 - 32.5 sec    aPTT, therapeutic range     58.0 - 77.0 SECS   GLUCOSE, POC    Collection Time: 01/27/17  7:01 AM   Result Value Ref Range    Glucose (POC) 83 65 - 100 mg/dL    Performed by Demarco Guzman    PTT    Collection Time: 01/27/17 10:01 AM   Result Value Ref Range    aPTT 59.6 (H) 22.1 - 32.5 sec    aPTT, therapeutic range     58.0 - 77.0 SECS     Current Facility-Administered Medications   Medication Dose Route Frequency    carvedilol (COREG) tablet 3.125 mg  3.125 mg Oral BID WITH MEALS    amiodarone (CORDARONE) tablet 200 mg  200 mg Oral Q12H    traZODone (DESYREL) tablet 100 mg  100 mg Oral QHS PRN    polyethylene glycol (MIRALAX) packet 17 g  17 g Oral DAILY    bacitracin 500 unit/gram packet 1 Packet  1 Packet Topical PRN    atorvastatin (LIPITOR) tablet 10 mg  10 mg Oral DAILY    insulin glargine (LANTUS) injection 16 Units  16 Units SubCUTAneous DAILY    glimepiride (AMARYL) tablet 2 mg  2 mg Oral ACB    pantoprazole (PROTONIX) tablet 40 mg  40 mg Oral ACB    ondansetron (ZOFRAN) injection 4 mg  4 mg IntraVENous Q6H PRN    senna-docusate (PERICOLACE) 8.6-50 mg per tablet 1 Tab  1 Tab Oral DAILY    ceFAZolin (ANCEF) 1 g in 0.9% sodium chloride (MBP/ADV) 50 mL  1 g IntraVENous Q8H    0.9% sodium chloride infusion  10 mL/hr IntraVENous CONTINUOUS    sodium chloride (NS) flush 5-10 mL  5-10 mL InterCATHeter Q8H    sodium chloride (NS) flush 5-10 mL  5-10 mL InterCATHeter PRN    acetaminophen (TYLENOL) tablet 650 mg  650 mg Oral Q6H PRN    aspirin delayed-release tablet 81 mg  81 mg Oral DAILY    glucagon (GLUCAGEN) injection 1 mg  1 mg IntraMUSCular PRN    glucose chewable tablet 16 g  4 Tab Oral PRN    insulin lispro (HUMALOG) injection   SubCUTAneous AC&HS    magnesium oxide (MAG-OX) tablet 400 mg  400 mg Oral DAILY    nicotine (NICODERM CQ) 21 mg/24 hr patch 1 Patch  1 Patch TransDERmal Q24H    0.9% sodium chloride infusion  3 mL/hr IntraVENous CONTINUOUS    sodium chloride (NS) flush 5-10 mL  5-10 mL IntraVENous Q8H    sodium chloride (NS) flush 5-10 mL  5-10 mL IntraVENous PRN    albuterol (PROVENTIL VENTOLIN) nebulizer solution 2.5 mg  2.5 mg Nebulization Q4H PRN    heparin 25,000 units in D5W 250 ml infusion  12-25 Units/kg/hr IntraVENous TITRATE    morphine injection 2 mg  2 mg IntraVENous Q4H PRN    diphenhydrAMINE (BENADRYL) capsule 25 mg  25 mg Oral QHS PRN    oxyCODONE-acetaminophen (PERCOCET) 5-325 mg per tablet 2 Tab  2 Tab Oral Q4H PRN    milrinone (PRIMACOR) 20 MG/100 ML D5W infusion  0.3 mcg/kg/min IntraVENous CONTINUOUS    ELECTROLYTE REPLACEMENT PROTOCOL  1 Each Other PRN       ASSESSMENT/PLAN:    ICM, Stage D, NYHA Class IV, EF 7%: Wean milrinone to 0.3 mcg/kg/min. Continue to hold diuretics today - may resume PO over weekend PRN. Begin low dose carvedilol - hold addition of ACE-1/ARB until BB tolerates and renal function stabilizes. TTE today to re-eval EF today - last TTE 1/23/17 shows EF 7% with severe diffuse HK, moderate to severe dilation of RV, biatrial enlargement, mild MR, trivial TR, and large left pleural effusion. Continue daily weights, strict I/O, Na+ restricted diet. HF education. Left pleural effusion: S/p thoracentesis, -1200 mL. Fluid sent for cytology, protein, and culture/gram stain. PCXR in AM.     Pulmonary hypertension: Weaning milrinone slowly. Hyponatremia: Due to HF vs. IVVD vs. Former ETOH abuse. Improved to 131 p 3 doses Tolvaptan.   Hold diuretic today - may resume PO PRN over the weekend. Appreciate renal input. CAD with LM disease: Repeat TTE today to assess EF - if it has not improved, will not be a candidate for CABG. Scheduled for cardiac MRI 11:00 am at [facility] to assess viability for high risk PCI. Continue ASA, heparin gtt, statin. Begin low dose BB. Smoking cessation counseling in progress. Atrial fibrillation: Continue amio PO. Continue heparin gtt until decision made re: PCI - will need warfarin long term. DM Type II: A1C 13.5. DTC consulted - continue Lantus and Amaryl ACB per recommendations - appreciate input. Continue AccuChecks AC/HS, continue SSI. Consistent carb diet, continue diabetic education. Thrombocytopenia: Platelets stable - 543 today - on ASA, heparin gtt. Monitor. Tobacco abuse: Nicotine patch. Continue smoking cessation education. Constipation: Resolved. Continue miralax, docusate. Lower extremity venous stasis disease vs. Cellulitis: Improving on antibiotics. Continue cefazolin 1g q8h per ID - appreciate input. RLE ulcer wound pending. Keep weeping areas clean, dry, and covered. Ppx: Continue pantoprazole for SUP, heparin gtt covers DVT ppx. PICC placed yesterday. Dispo: Remain in CVICU today.   Wife updated with plan of care.          Signed By: Krystin Livingston NP    Saw patient, agree with above  Risk of morbidity and mortality - high  Medical decision making - high complexity

## 2017-01-27 NOTE — PROGRESS NOTES
[facility] Progress Note      NAME:  Fernanda Sebastian :   1950   MRN:   [id number]   PCP:  None  CARD:   Dr. Lelia Gutierrez    Date:  2017     Fernanda Sebastian is a 61 y.o. male with a who presented for further evaluation of severe CAD and systolic heart failure. Subjective:   He was initially seen at [facility] 3 years ago and told that he had heart failure with LVEF 30%. He was lost to follow up due to lack of insurance and has not been seen by a physician in the interim. He complains of progressive fatigue, NAVARRO, PND, and edema over the past year, prompting presentation to [facility]. He also complains of lower extremity bullae, weeping, and pain. He denies palpitations, presyncope, syncope, or chest pain. Past 24 hours:  Left sided thoracentesis with removal of 1200 cc  CVP 18  CXR with significant reduction in left pleural effusion  Continues to have edema  Dyspnea has improved  Good appetite  Denies chest pain        Objective:     Visit Vitals    BP 99/82    Pulse 100    Temp 97.7 °F (36.5 °C)    Resp 22    Ht 5' 9.5\" (1.765 m)    Wt 80.5 kg (177 lb 7.5 oz)    SpO2 98%    BMI 25.83 kg/m2          General:  fatigued    HEENT: Normocephalic, EOMI, PERRLA, Hearing intact, trachea mid-line    Neck:  supple, no significant adenopathy, carotids upstroke normal bilaterally, no bruits    CVP:  12  cm  ( ++ ) HJR    Heart:  Diminished PMI    Normal S1 and S2, S3 gallop    Murmur: 2/6 systolic murmur    Lungs: Diminished breath sounds left lower lung    Abdomen: soft, non-tender. Bowel sounds normal. +HSM    Extremity: Warm, red on the right lower extremity with 4+ pitting edema bilaterally    Neuro: Alert and oriented to person, place, and time; normal strength and tone. Normal symmetric reflexes. Normal coordination and gait      1901 -  0700  In: 3621.3 [P.O.:1800;  I.V.:1821.3]  Out: 4925 [Urine:4925]  O2 Flow Rate (L/min): 2 l/min O2 Device: Nasal cannula  Temp (24hrs), Av.6 °F (36.4 °C), Min:96.1 °F (35.6 °C), Max:98.4 °F (36.9 °C)          Care Plan discussed with:    Comments   Patient x    Family      RN x    Care Manager                    Consultant:          Past History:     Past Medical History   Diagnosis Date    Cardiomyopathy ([address] 2017     A. Echo (17):  EF 5-10% with severe GHK,. Mildly dil LA. Mild TR. PASP 46. No past surgical history on file. Social History   Substance Use Topics    Smoking status: Not on file    Smokeless tobacco: Not on file    Alcohol use Not on file        No family history on file. Allergies:   No Known Allergies       Data Review:     CXR:   CXR Results  (Last 48 hours)               17 1448  XR CHEST PORT Final result    Impression:  IMPRESSION: Significant interval improvement in left pleural effusion and left   basilar atelectasis following thoracentesis. No pneumothorax. Narrative:  INDICATION: Status post left thoracentesis       COMPARISON: 2017 at 4:19 AM       FINDINGS: AP portable imaging of the chest performed at 2:37 PM demonstrates a   stable cardiomediastinal silhouette. There is a small left pleural effusion,   which is significantly decreased in size since the prior study. There is   significantly improved left basilar atelectasis. No pneumothorax is seen. Left   arm PICC line is unchanged in position. No significant osseous abnormalities are   seen. 17 0451  XR CHEST PORT Final result    Impression:  IMPRESSION: Dense left lower lobe consolidation with left effusion. Appropriate   PICC placement. Narrative:  EXAM:  XR CHEST PORT       INDICATION:  PAC       COMPARISON:         FINDINGS: A portable AP radiograph of the chest was obtained at 0419 hours. The   patient is on a cardiac monitor. A left upper extremity PICC line tip is in the   region of the superior vena cava.  The [place]-Val catheter has been removed. There   is dense left lower lobe consolidation with left effusion. The cardiac and   mediastinal contours and pulmonary vascularity are normal.  The bones and soft   tissues are grossly within normal limits. Echocardiogram:   Echo Results  (Last 48 hours)               17 1146  2D ECHO COMPLETE ADULT (TTE) W OR WO CONTR Final result    Narrative:  Darryl Thakur [address]   [address], [address]   [phone]       Transthoracic Echocardiogram       Patient: Alona Serrano   MRN: [id number]   ACCT #: [de-identified]   : [id number]   Age: 61 years   Gender: Male   Height: 69 in   Weight: 176.7 lb   BSA: 1.96 m squared   BP: 113 / 59 mmHg   Study date: 2017   Status: Routine   Location: [facility] ACC #: [id number]       Allergies: NO KNOWN ALLERGIES       Ordering Physician:  Roseanne Canseco. Sebas Henderson MD   Reading Group:  *CAV Group   Technologist:  Adin Matos RDCS   Reading Physician:  Mary Salas. STEVE Goodwin Miles:   Left ventricle: The ventricle was severely dilated. Systolic function was   severely reduced. Ejection fraction was estimated in the range of 5 % to   10 %. There was severe diffuse hypokinesis. Doppler parameters were   consistent with a reversible restrictive pattern, indicative of decreased   left ventricular diastolic compliance and/or increased left atrial   pressure (grade 3 diastolic dysfunction). Right ventricle: The ventricle was mildly dilated. Systolic function was   reduced. Mitral valve: There was mild regurgitation. Tricuspid valve: There was mild to moderate regurgitation. Pulmonary   artery systolic pressure: 50 mmHg. There was mild to moderate pulmonary   hypertension. INDICATIONS: Assess left ventricular function. PROCEDURE: This was a routine study. The study included complete 2D   imaging, M-mode, complete spectral Doppler, and color Doppler. The heart   rate was 90 bpm, at the start of the study. Systolic blood pressure was   113 mmHg, at the start of the study. Diastolic blood pressure was 59 mmHg,   at the start of the study. Image quality was adequate. LEFT VENTRICLE: The ventricle was severely dilated. Systolic function was   severely reduced. Ejection fraction was estimated in the range of 5 % to   10 %. There was severe diffuse hypokinesis. Wall thickness was normal.   DOPPLER: Doppler parameters were consistent with a reversible restrictive   pattern, indicative of decreased left ventricular diastolic compliance   and/or increased left atrial pressure (grade 3 diastolic dysfunction). RIGHT VENTRICLE: The ventricle was mildly dilated. Systolic function was   reduced. Wall thickness was normal.       LEFT ATRIUM: The atrium was mildly to moderately dilated. RIGHT ATRIUM: The atrium was mildly to moderately dilated. MITRAL VALVE: Normal valve structure. There was normal leaflet separation. DOPPLER: The transmitral velocity was within the normal range. There was   no evidence for stenosis. There was mild regurgitation. AORTIC VALVE: The valve was trileaflet. Leaflets exhibited normal   thickness and normal cuspal separation. DOPPLER: Transaortic velocity was   within the normal range. There was no stenosis. There was no regurgitation. TRICUSPID VALVE: Normal valve structure. There was normal leaflet   separation. DOPPLER: The transtricuspid velocity was within the normal   range. There was no evidence for tricuspid stenosis. There was mild to   moderate regurgitation. Pulmonary artery systolic pressure: 50 mmHg. There   was mild to moderate pulmonary hypertension. PULMONIC VALVE: Not well visualized, but normal Doppler findings. AORTA: The root exhibited normal size. PERICARDIUM: Insignificant pericardial effusion and/or pericardial fat was   present.        SYSTEM MEASUREMENT TABLES       2D   Ao Diam: 4.08 cm   LA Diam: 4.72 cm   LAAs A2C: 24.82 cm2 LAAs A4C: 27.13 cm2   LAESV A-L A2C: 91.65 ml   LAESV A-L A4C: 91.2 ml   LAESV Index (A-L): 51.11 ml/m2   LAESV MOD A2C: 89.14 ml   LAESV MOD A4C: 85.8 ml   LAESV(A-L): 100.18 ml   LALs A2C: 5.71 cm   LALs A4C: 6.85 cm   LVOT Diam: 2.52 cm   %FS: 7.2 %   EDV(Teich): 240.5 ml   EF Biplane: 10.73 %   EF(Teich): 15.59 %   ESV(Teich): 203.01 ml   IVSd: 1.03 cm   LVEDV MOD A2C: 260.49 ml   LVEDV MOD A4C: 177.33 ml   LVEDV MOD BP: 214.43 ml   LVEF MOD A2C: 16.02 %   LVEF MOD A4C: 8.53 %   LVESV MOD A2C: 218.76 ml   LVESV MOD A4C: 162.19 ml   LVESV MOD BP: 191.43 ml   LVIDd: 6.82 cm   LVIDs: 6.32 cm   LVLd A2C: 10.04 cm   LVLd A4C: 10.05 cm   LVLs A2C: 9.55 cm   LVLs A4C: 9.21 cm   LVPWd: 1.07 cm   SV MOD A2C: 41.73 ml   SV MOD A4C: 15.13 ml   SV(Teich): 37.5 ml   RA Diam: 5.21 cm   RVIDd: 5.59 cm       CW   AV Env. Ti: 251.19 ms   AV VTI: 17.84 cm   AV Vmax: 0.89 m/s   AV Vmean: 0.71 m/s   AV maxPG: 3.2 mmHg   AV meanP.13 mmHg   MR Vmax: 4.4 m/s   MR maxP.55 mmHg   PV Vmax: 0.75 m/s   PV maxP.27 mmHg   TR Vmax: 3 m/s   TR maxP mmHg       MM   TAPSE: 1.39 cm       PW   MIRELLA (VTI): 3.09 cm2   MIRELLA Vmax: 3.78 cm2   AVAI (VTI): 0 cm2/m2   AVAI Vmax: 0 cm2/m2   LVOT Env. Ti: 213.13 ms   LVOT VTI: 11.04 cm   LVOT Vmax: 0.68 m/s   LVOT Vmean: 0.52 m/s   LVOT maxP.83 mmHg   LVOT meanP.19 mmHg   E' Lat: 0.1 m/s   E' Sept: 0.04 m/s   E/E' Lat: 12.1   E/E' Sept: 27.48   MV A Glenn: 0.52 m/s   MV Dec [place]: 8.28 m/s2   MV DecT: 142.54 ms   MV E Glenn: 1.18 m/s   MV E/A Ratio: 2.36   MV PHT: 41.34 ms   MVA By PHT: 5.32 cm2   LVSI Dopp: 28.11 ml/m2   LVSV Dopp: 55.11 ml       Prepared and E-signed by       Katelin Jacob. Jose Montano M.D. Signed 2017 14:22:17                 No results found for this visit on 17.       ECG:  EKG: ST with occasional PVC, NSIVCD, LAE    LABS:  Recent Results (from the past 24 hour(s))   GLUCOSE, POC    Collection Time: 17  4:10 PM   Result Value Ref Range    Glucose (POC) 130 (H) 65 - 100 mg/dL    Performed by Mckenzie Lewis    PTT    Collection Time: 01/26/17  8:34 PM   Result Value Ref Range    aPTT 47.9 (H) 22.1 - 32.5 sec    aPTT, therapeutic range     58.0 - 77.0 SECS   SODIUM    Collection Time: 01/26/17  8:34 PM   Result Value Ref Range    Sodium 126 (L) 136 - 145 mmol/L   GLUCOSE, POC    Collection Time: 01/26/17  9:14 PM   Result Value Ref Range    Glucose (POC) 85 65 - 100 mg/dL    Performed by Cristobal Vasquez    PRO-BNP    Collection Time: 01/27/17  3:56 AM   Result Value Ref Range    NT pro-BNP 5427 (H) 0 - 125 PG/ML   CBC W/O DIFF    Collection Time: 01/27/17  3:56 AM   Result Value Ref Range    WBC 8.0 4.1 - 11.1 K/uL    RBC 4.43 4.10 - 5.70 M/uL    HGB 10.8 (L) 12.1 - 17.0 g/dL    HCT 33.9 (L) 36.6 - 50.3 %    MCV 76.5 (L) 80.0 - 99.0 FL    MCH 24.4 (L) 26.0 - 34.0 PG    MCHC 31.9 30.0 - 36.5 g/dL    RDW 15.8 (H) 11.5 - 14.5 %    PLATELET 528 (L) 714 - 400 K/uL   MAGNESIUM    Collection Time: 01/27/17  3:56 AM   Result Value Ref Range    Magnesium 2.0 1.6 - 2.4 mg/dL   METABOLIC PANEL, COMPREHENSIVE    Collection Time: 01/27/17  3:56 AM   Result Value Ref Range    Sodium 131 (L) 136 - 145 mmol/L    Potassium 4.1 3.5 - 5.1 mmol/L    Chloride 91 (L) 97 - 108 mmol/L    CO2 33 (H) 21 - 32 mmol/L    Anion gap 7 5 - 15 mmol/L    Glucose 74 65 - 100 mg/dL    BUN 34 (H) 6 - 20 MG/DL    Creatinine 1.83 (H) 0.70 - 1.30 MG/DL    BUN/Creatinine ratio 19 12 - 20      GFR est AA 45 (L) >60 ml/min/1.73m2    GFR est non-AA 37 (L) >60 ml/min/1.73m2    Calcium 8.9 8.5 - 10.1 MG/DL    Bilirubin, total 0.5 0.2 - 1.0 MG/DL    ALT 6 (L) 12 - 78 U/L    AST 15 15 - 37 U/L    Alk.  phosphatase 85 45 - 117 U/L    Protein, total 6.7 6.4 - 8.2 g/dL    Albumin 2.8 (L) 3.5 - 5.0 g/dL    Globulin 3.9 2.0 - 4.0 g/dL    A-G Ratio 0.7 (L) 1.1 - 2.2     PTT    Collection Time: 01/27/17  3:56 AM   Result Value Ref Range    aPTT 57.4 (H) 22.1 - 32.5 sec    aPTT, therapeutic range     58.0 - 77.0 SECS GLUCOSE, POC    Collection Time: 01/27/17  7:01 AM   Result Value Ref Range    Glucose (POC) 83 65 - 100 mg/dL    Performed by Yahaira Juarez    PTT    Collection Time: 01/27/17 10:01 AM   Result Value Ref Range    aPTT 59.6 (H) 22.1 - 32.5 sec    aPTT, therapeutic range     58.0 - 77.0 SECS   GLUCOSE, POC    Collection Time: 01/27/17 12:44 PM   Result Value Ref Range    Glucose (POC) 103 (H) 65 - 100 mg/dL    Performed by Mendez Bhatia          Medications reviewed:    Current Facility-Administered Medications   Medication Dose Route Frequency    carvedilol (COREG) tablet 3.125 mg  3.125 mg Oral BID WITH MEALS    amiodarone (CORDARONE) tablet 200 mg  200 mg Oral Q12H    traZODone (DESYREL) tablet 100 mg  100 mg Oral QHS PRN    polyethylene glycol (MIRALAX) packet 17 g  17 g Oral DAILY    bacitracin 500 unit/gram packet 1 Packet  1 Packet Topical PRN    atorvastatin (LIPITOR) tablet 10 mg  10 mg Oral DAILY    insulin glargine (LANTUS) injection 16 Units  16 Units SubCUTAneous DAILY    glimepiride (AMARYL) tablet 2 mg  2 mg Oral ACB    pantoprazole (PROTONIX) tablet 40 mg  40 mg Oral ACB    ondansetron (ZOFRAN) injection 4 mg  4 mg IntraVENous Q6H PRN    senna-docusate (PERICOLACE) 8.6-50 mg per tablet 1 Tab  1 Tab Oral DAILY    ceFAZolin (ANCEF) 1 g in 0.9% sodium chloride (MBP/ADV) 50 mL  1 g IntraVENous Q8H    0.9% sodium chloride infusion  10 mL/hr IntraVENous CONTINUOUS    sodium chloride (NS) flush 5-10 mL  5-10 mL InterCATHeter Q8H    sodium chloride (NS) flush 5-10 mL  5-10 mL InterCATHeter PRN    acetaminophen (TYLENOL) tablet 650 mg  650 mg Oral Q6H PRN    aspirin delayed-release tablet 81 mg  81 mg Oral DAILY    glucagon (GLUCAGEN) injection 1 mg  1 mg IntraMUSCular PRN    glucose chewable tablet 16 g  4 Tab Oral PRN    insulin lispro (HUMALOG) injection   SubCUTAneous AC&HS    magnesium oxide (MAG-OX) tablet 400 mg  400 mg Oral DAILY    nicotine (NICODERM CQ) 21 mg/24 hr patch 1 Patch  1 Patch TransDERmal Q24H    0.9% sodium chloride infusion  3 mL/hr IntraVENous CONTINUOUS    sodium chloride (NS) flush 5-10 mL  5-10 mL IntraVENous Q8H    sodium chloride (NS) flush 5-10 mL  5-10 mL IntraVENous PRN    albuterol (PROVENTIL VENTOLIN) nebulizer solution 2.5 mg  2.5 mg Nebulization Q4H PRN    heparin 25,000 units in D5W 250 ml infusion  12-25 Units/kg/hr IntraVENous TITRATE    morphine injection 2 mg  2 mg IntraVENous Q4H PRN    diphenhydrAMINE (BENADRYL) capsule 25 mg  25 mg Oral QHS PRN    oxyCODONE-acetaminophen (PERCOCET) 5-325 mg per tablet 2 Tab  2 Tab Oral Q4H PRN    milrinone (PRIMACOR) 20 MG/100 ML D5W infusion  0.3 mcg/kg/min IntraVENous CONTINUOUS    ELECTROLYTE REPLACEMENT PROTOCOL  1 Each Other PRN           IMPRESSION:   1. Acute on chronic systolic heart failure (ICM) - Stage D, NYHA Class IV  2. Severe native coronary artery disease  3. Diabetes Mellitus, Hga1c 13.5  4. History of tobacco abuse  5. Cellulitis  6. CHACORTA on CKD3  7. Pulmonary HTN  8. Persistent atrial fibrillation  9. Hyponatremia  10. Left pleural effusion       PLAN:   1. Continue IV milrinone 0.3 mcg/kg/min, Follow I/O, Replete electrolytes, Hold ACE-I due to CHACORTA on CKD. Start low dose carvedilol. Hold bumex today  2. Continue ASA, statin, low dose BB; too high risk for CABG d/t low LVEF and diabetes out of control  3. Consider protected left main PCI once medically optimized  4. Lantus and sliding scale insulin, accuchecks, diabetic education  5. IV cefazolin 1 gram q 8 hours  6. Smoking cessation counseling, nicotine patch  7. Change IV amiodarone to po amiodarone  8. Sent pleural fluid for gram stain, culture, and cytology          Thank you for letting me see him with you,    Dorie Wharton MD, [place] [address] Director  Jennifer Roman [phone] [facility], [address] [place] [address], [address] [place] [address]  Office: [phone]  Fax: [phone]  24 hour VAD/HF Pager: [phone]

## 2017-01-27 NOTE — PROGRESS NOTES
Infectious Diseases Progress Note    Antibiotic Summary:  Levaquin  --   Vancomycin  --   Ancef    -- present      Subjective:     Legs feel better    Objective:     Vitals:   Visit Vitals    /64    Pulse 93    Temp 96.1 °F (35.6 °C)    Resp 19    Ht 5' 9.5\" (1.765 m)    Wt 81 kg (178 lb 9.2 oz)    SpO2 98%    BMI 25.99 kg/m2        Tmax:  Temp (24hrs), Av.9 °F (36.6 °C), Min:96.1 °F (35.6 °C), Max:98.6 °F (37 °C)      Exam:  General appearance: alert, no distress  Lungs: few rales  Heart: RRR  Abdomen: soft; nontender  Left leg: looks better  Right leg: looks better today    IV Lines: RIJ inserted     Labs:    Recent Labs      17   0345  17   0434  17   0356   WBC  6.3  6.2  6.4   HGB  10.7*  11.3*  11.5*   PLT  139*  147*  156   BUN  35*  30*  28*   CREA  2.23*  1.86*  1.69*   TBILI  0.5  0.6  0.9   SGOT  10*  11*  14*   AP  77  83  81     Culture right leg ulcers  = group C Streptococcus    Assessment:     1. Bilateral venous stasis disease of the LEs +/- cellulitis: Improving     2. OHD -- IHD/CAD; CHF; pulm HTN     3. CRF with acute exacerbation     4. NIDDM -- new diagnosis     5. Probable COPD -- heavy smoking since age 7      10. Anemia -- HC/MC -- positive family history of colon cancer -- may need GI W/U    Plan:     1.  Susan Strong MD

## 2017-01-27 NOTE — PROGRESS NOTES
2030 Received report from [address] [place] [address] and assumed care. VSS, denies pain. PTT and Na labs drawn. No needs or complaints at this time. 2200 PTT 47.9, Heparin drip increased to 25u/kg/hr and 2000u bolus given. Percocet given for LE pain. 2330 Bedside and Verbal shift change report given to [address] [place] [address] (oncoming nurse) by Sheridan Crain (offgoing nurse). Report included the following information SBAR, Kardex, Intake/Output, MAR, Recent Results and Cardiac Rhythm NSR.

## 2017-01-27 NOTE — PROGRESS NOTES
Problem: Diabetes Self-Management  Goal: *Incorporating physical activity into lifestyle  Outcome: Progressing Towards Goal  Up ad amelie    Problem: Falls - Risk of  Goal: *Absence of falls  Outcome: Progressing Towards Goal  Steady gate    Problem: Heart Failure: Discharge Outcomes  Goal: *Demonstrates ability to perform prescribed activity without shortness of breath or discomfort  Outcome: Progressing Towards Goal  Increasing activity

## 2017-01-27 NOTE — PROGRESS NOTES
Contacted the patient's wife today and shared that per the PT he is functionally not going to require any heavy lifting once d/c to home. Currently, no home health recommendations from therapy or DME procurement necessary. Discussed the need to complete a card card application and patient's wife to bring in the necessary documentation to complete the application. Will continue to follow and assist with plan of care. Provided support to the patient's wife over the phone as she verbalized frustration with working while her  faces critical medical needs.      Yuniel Sheffield, MSW, LCSW    Clinical    Jennifer Roman [phone]

## 2017-01-27 NOTE — PROGRESS NOTES
16:20 - 16:52    Despite aggressive diuresis (over 10 liters off since admission) and inotropes, we cannot improve Mr Vora's EF from 5-10% based on today's ECHO. Just do not feel comfortable taking him to surgery given his overwhelmingly high risk of death. I think we should at least evaluate him for high risk left main stenting at this point. Contacted Dr Joanna Mendez at [facility] for cardiac MRI early next week. If his heart looks viable, probably worth high risk left main stenting with Impella support. If not viable, will go with medical therapy. Will try to increase coreg over the weekend; will try to wean his milrinone if possible; LFTs elevated so decreasing amiodarone; on PO Bumex      Intake/Output Summary (Last 24 hours) at 01/27/17 1647  Last data filed at 01/27/17 1200   Gross per 24 hour   Intake          1143.67 ml   Output             2475 ml   Net         -1331.33 ml       Neuro - A x O x 3      Pulmonary - pulmonary edema / pleural effusion; significant improvement in pleural effusion following thoracentesis; pulmonary congestion is much improved; sat's 97% on 2 L     Cardiac - acute on chronic NYHA class IV systolic heart failure (EF 7%), left main and 3 vessel CAD, A-fib, moderate to severe right heart failure; pulmonary HTN; C.I., CVP 13-19; continuing milrinone @ 0.375; on PO amiodarone 200 BID     Renal - CHACORTA, CKD (last creatinine 1.8); holding Bumex for now; still Tolvapten; (-) 1300 cc over last 24 hours     GI - tolerating regular diet; no issues     M/S - severe cellulitis and erythematous changes in lower extremities; 2+ lower extremity edema (much improved since admisison);  wound dressings applied to lower extremity blisters     Psych - no obvious anxiety or depression      Endocrine - DM type II; on SSI and oral meds     Fluids / Electrolytes - hyponatremia (Na 131);  Tolvapten      Infectious disease - Ancef for cellulitis     Heme - Hct 34, platelets 111     Total critical care time - 32 minutes

## 2017-01-27 NOTE — PROGRESS NOTES
Problem: Falls - Risk of  Goal: *Absence of falls  Outcome: Progressing Towards Goal  steady    Problem: Pressure Ulcer - Risk of  Goal: *Prevention of pressure ulcer  Outcome: Progressing Towards Goal  LE wound care for cellulitis, wound care following

## 2017-01-27 NOTE — PROGRESS NOTES
[facility]   [address] [facility], [facility] [address], [facility]  Phone: [phone]   [phone]       Nephrology Progress Note  David Howell.     1950     [id number]  Date of Admission : 1/20/2017 01/27/17    CC: Follow up for CKD/ARF      Assessment and Plan   CHACORTA on CKD :  - CHACORTA likely 2/2 cardiorenal syndrome + mild volume depletion from diuretics  - Cr stable  - cont current care  - labs in AM    Hyponatremia :  - likely has chronic hyponatremia from CHF + alcoholism  - his PNA can be playing a role as well   - Na up to 131 (8meq in 24 hours) which is appropriate correction  - hold on tolvaptan  - repeat Na in AM    Acute on Chronic Systolic CHF w/ severe CMP  - echo with EF 5-10%, severely dilated LV  - Multivessel CAD : being evaluated for CABG   - On Milrinone     Cellulitis RLE w/ Pustular lesions     Pulm HTN     Chronic smoker w/VENANCIO -PNA    Discussed with Dr. Sebas Henderson and nursing     Interval History:  Seen and examined. Feeling ok. Stable UOP off diuretics. Na up to 131 this AM.  No cp, sob, n/v/d reported. Review of Systems: Pertinent items are noted in HPI.     Current Medications:   Current Facility-Administered Medications   Medication Dose Route Frequency    carvedilol (COREG) tablet 3.125 mg  3.125 mg Oral BID WITH MEALS    amiodarone (CORDARONE) tablet 200 mg  200 mg Oral Q12H    traZODone (DESYREL) tablet 100 mg  100 mg Oral QHS PRN    polyethylene glycol (MIRALAX) packet 17 g  17 g Oral DAILY    bacitracin 500 unit/gram packet 1 Packet  1 Packet Topical PRN    atorvastatin (LIPITOR) tablet 10 mg  10 mg Oral DAILY    insulin glargine (LANTUS) injection 16 Units  16 Units SubCUTAneous DAILY    glimepiride (AMARYL) tablet 2 mg  2 mg Oral ACB    pantoprazole (PROTONIX) tablet 40 mg  40 mg Oral ACB    ondansetron (ZOFRAN) injection 4 mg  4 mg IntraVENous Q6H PRN    senna-docusate (PERICOLACE) 8.6-50 mg per tablet 1 Tab  1 Tab Oral DAILY    ceFAZolin (ANCEF) 1 g in 0.9% sodium chloride (MBP/ADV) 50 mL  1 g IntraVENous Q8H    0.9% sodium chloride infusion  10 mL/hr IntraVENous CONTINUOUS    sodium chloride (NS) flush 5-10 mL  5-10 mL InterCATHeter Q8H    sodium chloride (NS) flush 5-10 mL  5-10 mL InterCATHeter PRN    acetaminophen (TYLENOL) tablet 650 mg  650 mg Oral Q6H PRN    aspirin delayed-release tablet 81 mg  81 mg Oral DAILY    glucagon (GLUCAGEN) injection 1 mg  1 mg IntraMUSCular PRN    glucose chewable tablet 16 g  4 Tab Oral PRN    insulin lispro (HUMALOG) injection   SubCUTAneous AC&HS    magnesium oxide (MAG-OX) tablet 400 mg  400 mg Oral DAILY    nicotine (NICODERM CQ) 21 mg/24 hr patch 1 Patch  1 Patch TransDERmal Q24H    0.9% sodium chloride infusion  3 mL/hr IntraVENous CONTINUOUS    sodium chloride (NS) flush 5-10 mL  5-10 mL IntraVENous Q8H    sodium chloride (NS) flush 5-10 mL  5-10 mL IntraVENous PRN    albuterol (PROVENTIL VENTOLIN) nebulizer solution 2.5 mg  2.5 mg Nebulization Q4H PRN    heparin 25,000 units in D5W 250 ml infusion  12-25 Units/kg/hr IntraVENous TITRATE    morphine injection 2 mg  2 mg IntraVENous Q4H PRN    diphenhydrAMINE (BENADRYL) capsule 25 mg  25 mg Oral QHS PRN    oxyCODONE-acetaminophen (PERCOCET) 5-325 mg per tablet 2 Tab  2 Tab Oral Q4H PRN    milrinone (PRIMACOR) 20 MG/100 ML D5W infusion  0.3 mcg/kg/min IntraVENous CONTINUOUS    ELECTROLYTE REPLACEMENT PROTOCOL  1 Each Other PRN      No Known Allergies    Objective:  Vitals:    Vitals:    01/27/17 0700 01/27/17 0800 01/27/17 0900 01/27/17 1000   BP: 113/75 106/60  113/59   Pulse: 97 92 (!) 102 98   Resp: 21 17 23 17   Temp:  97.7 °F (36.5 °C)     SpO2: 97% 98% 98% 97%   Weight:       Height:         Intake and Output:  01/27 0701 - 01/27 1900  In: -   Out: 450 [Urine:450]  01/25 1901 - 01/27 0700  In: 3612.3 [P.O.:1800;  I.V.:1812.3]  Out: 2897 [Urine:4925]    Physical Examination:  General: Awake, alert  Resp:  Lungs mostly clear  CV:  RRR, no murmur or rub,+ LE edema  GI:  Soft, NT, + Bowel sounds, no hepatosplenomegaly  Neurologic:  Non focal  Skin:  RLE cellulitis   :  No mendoza    []    High complexity decision making was performed  []    Patient is at high-risk of decompensation with multiple organ involvement    Lab Data Personally Reviewed: I have reviewed all the pertinent labs, microbiology data and radiology studies during assessment. Recent Labs      01/27/17   0356  01/26/17 2034  01/26/17   1207  01/26/17 0345 01/25/17   2134   01/25/17   0434   01/24/17   1319   NA  131*  126*  124*  123*  123*   < >  126*   < >  123*   K  4.1   --    --   4.1   --    --   4.1   --   4.2   CL  91*   --    --   85*   --    --   87*   --    --    CO2  33*   --    --   32   --    --   30   --    --    GLU  74   --    --   122*   --    --   201*   --    --    BUN  34*   --    --   35*   --    --   30*   --    --    CREA  1.83*   --    --   2.23*   --    --   1.86*   --    --    CA  8.9   --    --   8.4*   --    --   8.5   --    --    MG  2.0   --    --   1.9   --    --   2.2   --   1.9   ALB  2.8*   --    --   2.7*   --    --   2.6*   --    --    SGOT  15   --    --   10*   --    --   11*   --    --    ALT  6*   --    --   8*   --    --   10*   --    --     < > = values in this interval not displayed. Recent Labs      01/27/17   0356 01/26/17 0345 01/25/17   0434   WBC  8.0  6.3  6.2   HGB  10.8*  10.7*  11.3*   HCT  33.9*  33.3*  34.9*   PLT  142*  139*  147*     No results found for: SDES  Lab Results   Component Value Date/Time    Culture result: PENDING 01/26/2017 03:05 PM    Culture result:  01/25/2017 09:34 PM     LIGHT  STREPTOCOCCI, BETA HEMOLYTIC GROUP C  . .. Penicillin and ampicillin are drugs of choice for treatment of beta-hemolytic streptococcal infections.  Susceptibility testing of penicillins and beta-lactams approved by the FDA for treatment of beta-hemolytic streptococcal infections need not be performed routinely, because nonsusceptible isolates are extremely rare. CLSI 2012      Culture result: LIGHT  YEAST   01/25/2017 09:34 PM    Culture result: NO SIGNIFICANT GROWTH 01/19/2017 01:35 AM     Recent Results (from the past 24 hour(s))   OSMOLALITY, UR    Collection Time: 01/26/17 12:07 PM   Result Value Ref Range    Osmolality,urine 165 MOSM/kg H2O   SODIUM    Collection Time: 01/26/17 12:07 PM   Result Value Ref Range    Sodium 124 (L) 136 - 145 mmol/L   GLUCOSE, POC    Collection Time: 01/26/17 12:16 PM   Result Value Ref Range    Glucose (POC) 160 (H) 65 - 100 mg/dL    Performed by 2 Max Zander    Collection Time: 01/26/17  3:05 PM   Result Value Ref Range    Special Requests: NO SPECIAL REQUESTS      GRAM STAIN OCCASIONAL  WBCS SEEN        GRAM STAIN NO ORGANISMS SEEN      Culture result: PENDING    PROTEIN TOTAL, FLUID    Collection Time: 01/26/17  3:05 PM   Result Value Ref Range    Fluid Type: THORCENFLD     Protein total, body fld.  2.4 g/dL   GLUCOSE, POC    Collection Time: 01/26/17  4:10 PM   Result Value Ref Range    Glucose (POC) 130 (H) 65 - 100 mg/dL    Performed by Clementine Flowers    PTT    Collection Time: 01/26/17  8:34 PM   Result Value Ref Range    aPTT 47.9 (H) 22.1 - 32.5 sec    aPTT, therapeutic range     58.0 - 77.0 SECS   SODIUM    Collection Time: 01/26/17  8:34 PM   Result Value Ref Range    Sodium 126 (L) 136 - 145 mmol/L   GLUCOSE, POC    Collection Time: 01/26/17  9:14 PM   Result Value Ref Range    Glucose (POC) 85 65 - 100 mg/dL    Performed by [facility]    CBC W/O DIFF    Collection Time: 01/27/17  3:56 AM   Result Value Ref Range    WBC 8.0 4.1 - 11.1 K/uL    RBC 4.43 4.10 - 5.70 M/uL    HGB 10.8 (L) 12.1 - 17.0 g/dL    HCT 33.9 (L) 36.6 - 50.3 %    MCV 76.5 (L) 80.0 - 99.0 FL    MCH 24.4 (L) 26.0 - 34.0 PG    MCHC 31.9 30.0 - 36.5 g/dL    RDW 15.8 (H) 11.5 - 14.5 %    PLATELET 309 (L) 794 - 400 K/uL   MAGNESIUM    Collection Time: 01/27/17  3:56 AM Result Value Ref Range    Magnesium 2.0 1.6 - 2.4 mg/dL   METABOLIC PANEL, COMPREHENSIVE    Collection Time: 01/27/17  3:56 AM   Result Value Ref Range    Sodium 131 (L) 136 - 145 mmol/L    Potassium 4.1 3.5 - 5.1 mmol/L    Chloride 91 (L) 97 - 108 mmol/L    CO2 33 (H) 21 - 32 mmol/L    Anion gap 7 5 - 15 mmol/L    Glucose 74 65 - 100 mg/dL    BUN 34 (H) 6 - 20 MG/DL    Creatinine 1.83 (H) 0.70 - 1.30 MG/DL    BUN/Creatinine ratio 19 12 - 20      GFR est AA 45 (L) >60 ml/min/1.73m2    GFR est non-AA 37 (L) >60 ml/min/1.73m2    Calcium 8.9 8.5 - 10.1 MG/DL    Bilirubin, total 0.5 0.2 - 1.0 MG/DL    ALT 6 (L) 12 - 78 U/L    AST 15 15 - 37 U/L    Alk. phosphatase 85 45 - 117 U/L    Protein, total 6.7 6.4 - 8.2 g/dL    Albumin 2.8 (L) 3.5 - 5.0 g/dL    Globulin 3.9 2.0 - 4.0 g/dL    A-G Ratio 0.7 (L) 1.1 - 2.2     PTT    Collection Time: 01/27/17  3:56 AM   Result Value Ref Range    aPTT 57.4 (H) 22.1 - 32.5 sec    aPTT, therapeutic range     58.0 - 77.0 SECS   GLUCOSE, POC    Collection Time: 01/27/17  7:01 AM   Result Value Ref Range    Glucose (POC) 83 65 - 100 mg/dL    Performed by Zurdo Agosto    PTT    Collection Time: 01/27/17 10:01 AM   Result Value Ref Range    aPTT 59.6 (H) 22.1 - 32.5 sec    aPTT, therapeutic range     58.0 - 77.0 SECS           I have reviewed the flowsheets. Chart and Pertinent Notes have been reviewed. No change in PMH ,family and social history from Consult note.       Jasvir Yanes MD

## 2017-01-27 NOTE — PROGRESS NOTES
0000- Bedside and Verbal shift change report given to [address] (oncoming nurse) by Jana Moran RN (offgoing nurse). Report included the following information SBAR, Procedure Summary, Intake/Output, Recent Results and Cardiac Rhythm Sinus Rythm. 0200- Pt had large BM. Stated he does not want a stool softener in AM.  0300- Pt resting quietly. 0400- AM labs retrieved. Pt tolerated well  0600- Pt weighed on standing scale. Tolerated movement and transferring to chair with one stand by assist.  0800- Bedside and Verbal shift change report given to Rickie Am[address] (oncoming nurse) by Juliana Walker RN (offgoing nurse). Report included the following information SBAR, Intake/Output, Recent Results and Cardiac Rhythm Sinus Rythm with BBB.

## 2017-01-27 NOTE — PROGRESS NOTES
1600 - Bedside report received from Mikaela Gonzales RN.   2000 - Bedside shift change report given to Jeimy Rodriguez RN (oncoming nurse) by Ehsan Aldridge RN (offgoing nurse). Report included the following information SBAR, Kardex, OR Summary, Procedure Summary, Intake/Output, MAR, Recent Results and Cardiac Rhythm NSR.

## 2017-01-27 NOTE — DIABETES MGMT
DTC Progress Note    Recommendations/ Comments: Pt hypoglycemic on chemistry this am and now running low normal. If appropriate, please consider decreasing Lantus dose to 10 units. DTC will continue to follow and will follow up for further education when patient no longer in ICU. Chart reviewed on Fern[facility] Rose. .    Patient is a 61 y.o. male with newly diagnosed Type 2 Diabetes. A1c:   Lab Results   Component Value Date/Time    Hemoglobin A1c 13.5 01/19/2017 05:05 PM    Hemoglobin A1c 13.4 01/18/2017 11:11 PM       Recent Glucose Results:   Lab Results   Component Value Date/Time    GLU 74 01/27/2017 03:56 AM    GLUCPOC 103 (H) 01/27/2017 12:44 PM    GLUCPOC 83 01/27/2017 07:01 AM    GLUCPOC 85 01/26/2017 09:14 PM        Lab Results   Component Value Date/Time    Creatinine 1.83 01/27/2017 03:56 AM       Active Orders   Diet    DIET CARDIAC Regular; Consistent Carb 1800kcal; FR 2000ML        PO intake:   Patient Vitals for the past 72 hrs:   % Diet Eaten   01/26/17 1800 100 %   01/26/17 0802 5 %   01/25/17 0900 100 %       Current hospital DM medication: Lantus 16 units, Amaryl 2 mg daily and Humalog for correction, high sensitivity    Will continue to follow as needed.     Thank you  Da Bowling, MS, RN, CDE

## 2017-01-28 ENCOUNTER — APPOINTMENT (OUTPATIENT)
Dept: GENERAL RADIOLOGY | Age: 67
DRG: 215 | End: 2017-01-28
Attending: NURSE PRACTITIONER
Payer: SELF-PAY

## 2017-01-28 LAB
ALBUMIN SERPL BCP-MCNC: 2.4 G/DL (ref 3.5–5)
ALBUMIN/GLOB SERPL: 0.6 {RATIO} (ref 1.1–2.2)
ALP SERPL-CCNC: 79 U/L (ref 45–117)
ALT SERPL-CCNC: 7 U/L (ref 12–78)
ANION GAP BLD CALC-SCNC: 8 MMOL/L (ref 5–15)
APTT PPP: 72.5 SEC (ref 22.1–32.5)
AST SERPL W P-5'-P-CCNC: 11 U/L (ref 15–37)
BILIRUB SERPL-MCNC: 0.5 MG/DL (ref 0.2–1)
BNP SERPL-MCNC: 6876 PG/ML (ref 0–125)
BUN SERPL-MCNC: 31 MG/DL (ref 6–20)
BUN/CREAT SERPL: 19 (ref 12–20)
CALCIUM SERPL-MCNC: 8.4 MG/DL (ref 8.5–10.1)
CHLORIDE SERPL-SCNC: 93 MMOL/L (ref 97–108)
CO2 SERPL-SCNC: 30 MMOL/L (ref 21–32)
CREAT SERPL-MCNC: 1.67 MG/DL (ref 0.7–1.3)
ERYTHROCYTE [DISTWIDTH] IN BLOOD BY AUTOMATED COUNT: 16.2 % (ref 11.5–14.5)
GLOBULIN SER CALC-MCNC: 3.9 G/DL (ref 2–4)
GLUCOSE BLD STRIP.AUTO-MCNC: 113 MG/DL (ref 65–100)
GLUCOSE BLD STRIP.AUTO-MCNC: 129 MG/DL (ref 65–100)
GLUCOSE BLD STRIP.AUTO-MCNC: 141 MG/DL (ref 65–100)
GLUCOSE BLD STRIP.AUTO-MCNC: 226 MG/DL (ref 65–100)
GLUCOSE SERPL-MCNC: 124 MG/DL (ref 65–100)
HCT VFR BLD AUTO: 31.7 % (ref 36.6–50.3)
HGB BLD-MCNC: 10 G/DL (ref 12.1–17)
MAGNESIUM SERPL-MCNC: 1.9 MG/DL (ref 1.6–2.4)
MCH RBC QN AUTO: 24.2 PG (ref 26–34)
MCHC RBC AUTO-ENTMCNC: 31.5 G/DL (ref 30–36.5)
MCV RBC AUTO: 76.8 FL (ref 80–99)
PLATELET # BLD AUTO: 131 K/UL (ref 150–400)
POTASSIUM SERPL-SCNC: 4 MMOL/L (ref 3.5–5.1)
PROT SERPL-MCNC: 6.3 G/DL (ref 6.4–8.2)
RBC # BLD AUTO: 4.13 M/UL (ref 4.1–5.7)
SERVICE CMNT-IMP: ABNORMAL
SODIUM SERPL-SCNC: 131 MMOL/L (ref 136–145)
THERAPEUTIC RANGE,PTTT: ABNORMAL SECS (ref 58–77)
WBC # BLD AUTO: 6.7 K/UL (ref 4.1–11.1)

## 2017-01-28 PROCEDURE — 85027 COMPLETE CBC AUTOMATED: CPT | Performed by: NURSE PRACTITIONER

## 2017-01-28 PROCEDURE — 74011636637 HC RX REV CODE- 636/637: Performed by: NURSE PRACTITIONER

## 2017-01-28 PROCEDURE — 85730 THROMBOPLASTIN TIME PARTIAL: CPT | Performed by: NURSE PRACTITIONER

## 2017-01-28 PROCEDURE — 74011250636 HC RX REV CODE- 250/636: Performed by: THORACIC SURGERY (CARDIOTHORACIC VASCULAR SURGERY)

## 2017-01-28 PROCEDURE — 74011000258 HC RX REV CODE- 258: Performed by: INTERNAL MEDICINE

## 2017-01-28 PROCEDURE — 83735 ASSAY OF MAGNESIUM: CPT | Performed by: NURSE PRACTITIONER

## 2017-01-28 PROCEDURE — 74011250637 HC RX REV CODE- 250/637: Performed by: THORACIC SURGERY (CARDIOTHORACIC VASCULAR SURGERY)

## 2017-01-28 PROCEDURE — 82962 GLUCOSE BLOOD TEST: CPT

## 2017-01-28 PROCEDURE — 71010 XR CHEST PORT: CPT

## 2017-01-28 PROCEDURE — 80053 COMPREHEN METABOLIC PANEL: CPT | Performed by: NURSE PRACTITIONER

## 2017-01-28 PROCEDURE — 77010033678 HC OXYGEN DAILY

## 2017-01-28 PROCEDURE — 74011250636 HC RX REV CODE- 250/636: Performed by: NURSE PRACTITIONER

## 2017-01-28 PROCEDURE — 74011250637 HC RX REV CODE- 250/637: Performed by: NURSE PRACTITIONER

## 2017-01-28 PROCEDURE — 83880 ASSAY OF NATRIURETIC PEPTIDE: CPT | Performed by: NURSE PRACTITIONER

## 2017-01-28 PROCEDURE — 77030011256 HC DRSG MEPILEX <16IN NO BORD MOLN -A

## 2017-01-28 PROCEDURE — 74011250636 HC RX REV CODE- 250/636: Performed by: INTERNAL MEDICINE

## 2017-01-28 PROCEDURE — 36415 COLL VENOUS BLD VENIPUNCTURE: CPT | Performed by: NURSE PRACTITIONER

## 2017-01-28 PROCEDURE — 65610000003 HC RM ICU SURGICAL

## 2017-01-28 RX ORDER — MAGNESIUM SULFATE 1 G/100ML
1 INJECTION INTRAVENOUS ONCE
Status: COMPLETED | OUTPATIENT
Start: 2017-01-28 | End: 2017-01-30

## 2017-01-28 RX ADMIN — CEFAZOLIN SODIUM 1 G: 1 INJECTION, POWDER, FOR SOLUTION INTRAMUSCULAR; INTRAVENOUS at 00:03

## 2017-01-28 RX ADMIN — AMIODARONE HYDROCHLORIDE 200 MG: 200 TABLET ORAL at 08:45

## 2017-01-28 RX ADMIN — SODIUM CHLORIDE 3 ML/HR: 900 INJECTION, SOLUTION INTRAVENOUS at 20:44

## 2017-01-28 RX ADMIN — Medication 10 ML: at 06:44

## 2017-01-28 RX ADMIN — AMIODARONE HYDROCHLORIDE 200 MG: 200 TABLET ORAL at 22:07

## 2017-01-28 RX ADMIN — CEFAZOLIN SODIUM 1 G: 1 INJECTION, POWDER, FOR SOLUTION INTRAMUSCULAR; INTRAVENOUS at 15:01

## 2017-01-28 RX ADMIN — CARVEDILOL 3.12 MG: 3.12 TABLET, FILM COATED ORAL at 08:44

## 2017-01-28 RX ADMIN — Medication 81 MG: at 08:44

## 2017-01-28 RX ADMIN — SODIUM CHLORIDE 10 ML/HR: 900 INJECTION, SOLUTION INTRAVENOUS at 01:31

## 2017-01-28 RX ADMIN — TRAZODONE HYDROCHLORIDE 100 MG: 50 TABLET ORAL at 23:46

## 2017-01-28 RX ADMIN — MAGNESIUM SULFATE 1 G: 1 INJECTION INTRAVENOUS at 07:40

## 2017-01-28 RX ADMIN — CARVEDILOL 3.12 MG: 3.12 TABLET, FILM COATED ORAL at 16:51

## 2017-01-28 RX ADMIN — ATORVASTATIN CALCIUM 10 MG: 10 TABLET, FILM COATED ORAL at 08:45

## 2017-01-28 RX ADMIN — SODIUM CHLORIDE 10 ML/HR: 900 INJECTION, SOLUTION INTRAVENOUS at 20:47

## 2017-01-28 RX ADMIN — DOCUSATE SODIUM AND SENNOSIDES 1 TABLET: 8.6; 5 TABLET, FILM COATED ORAL at 08:45

## 2017-01-28 RX ADMIN — OXYCODONE HYDROCHLORIDE AND ACETAMINOPHEN 2 TABLET: 5; 325 TABLET ORAL at 19:52

## 2017-01-28 RX ADMIN — MILRINONE LACTATE 0.3 MCG/KG/MIN: 200 INJECTION, SOLUTION INTRAVENOUS at 01:32

## 2017-01-28 RX ADMIN — OXYCODONE HYDROCHLORIDE AND ACETAMINOPHEN 2 TABLET: 5; 325 TABLET ORAL at 00:28

## 2017-01-28 RX ADMIN — Medication 400 MG: at 08:45

## 2017-01-28 RX ADMIN — INSULIN LISPRO 2 UNITS: 100 INJECTION, SOLUTION INTRAVENOUS; SUBCUTANEOUS at 13:09

## 2017-01-28 RX ADMIN — INSULIN GLARGINE 16 UNITS: 100 INJECTION, SOLUTION SUBCUTANEOUS at 08:42

## 2017-01-28 RX ADMIN — HEPARIN SODIUM AND DEXTROSE 25 UNITS/KG/HR: 10000; 5 INJECTION INTRAVENOUS at 00:09

## 2017-01-28 RX ADMIN — MILRINONE LACTATE 0.2 MCG/KG/MIN: 200 INJECTION, SOLUTION INTRAVENOUS at 19:28

## 2017-01-28 RX ADMIN — POLYETHYLENE GLYCOL 3350 17 G: 17 POWDER, FOR SOLUTION ORAL at 08:44

## 2017-01-28 RX ADMIN — CEFAZOLIN SODIUM 1 G: 1 INJECTION, POWDER, FOR SOLUTION INTRAMUSCULAR; INTRAVENOUS at 23:06

## 2017-01-28 RX ADMIN — CEFAZOLIN SODIUM 1 G: 1 INJECTION, POWDER, FOR SOLUTION INTRAMUSCULAR; INTRAVENOUS at 06:40

## 2017-01-28 RX ADMIN — PANTOPRAZOLE SODIUM 40 MG: 40 TABLET, DELAYED RELEASE ORAL at 07:33

## 2017-01-28 RX ADMIN — TRAZODONE HYDROCHLORIDE 100 MG: 50 TABLET ORAL at 00:03

## 2017-01-28 RX ADMIN — OXYCODONE HYDROCHLORIDE AND ACETAMINOPHEN 2 TABLET: 5; 325 TABLET ORAL at 14:58

## 2017-01-28 RX ADMIN — Medication 10 ML: at 00:03

## 2017-01-28 RX ADMIN — GLIMEPIRIDE 2 MG: 2 TABLET ORAL at 07:37

## 2017-01-28 RX ADMIN — HEPARIN SODIUM AND DEXTROSE 25 UNITS/KG/HR: 10000; 5 INJECTION INTRAVENOUS at 13:01

## 2017-01-28 RX ADMIN — OXYCODONE HYDROCHLORIDE AND ACETAMINOPHEN 2 TABLET: 5; 325 TABLET ORAL at 08:24

## 2017-01-28 NOTE — PROGRESS NOTES
Problem: Heart Failure: Discharge Outcomes  Goal: *Demonstrates ability to perform prescribed activity without shortness of breath or discomfort  Outcome: Progressing Towards Goal  Slowly the patient is increasing and tolerating his ADL. He does sleep a lot of the day  Goal: *Left ventricular function assessment completed prior to or during stay, or planned for post-discharge  Outcome: Not Progressing Towards Goal  The patient is still on milrinone  Goal: *Verbalizes understanding and describes prescribed diet  Outcome: Not Progressing Towards Goal  The patient has a fair understanding of his heart failure medications and the necessary things he needs to monitor such as weight and fluid intake  Goal: *Describes/verbalizes understanding of follow-up/return appt  (eg: to physicians, diabetes treatment coordinator, and other resources   Outcome: Not Progressing Towards Goal  Patient is still a long way from discharge.  In the past he has not seeked out medical treatments    Problem: Impaired Skin Integrity/Pressure Ulcer Treatment  Goal: *Improvement of existing pressure ulcer  Outcome: Progressing Towards Goal  Wounds are slowly improving

## 2017-01-28 NOTE — PROGRESS NOTES
[facility]  Progress Note      Date: 2017     Admit Date: 2017    Mr. Sharath Caruso is a 61year old male patient admitted who was admitted to [facility] on  and transferred to [facility] on  for further evaluation of severe left main and 3 vessel CAD and systolic heart failure (EF 5-10%). Subjective:   No events overnight. Resting comfortably. Objective:     Visit Vitals    BP 90/56    Pulse (!) 101    Temp 98.2 °F (36.8 °C)    Resp 19    Ht 5' 9.5\" (1.765 m)    Wt 183 lb 13.8 oz (83.4 kg)    SpO2 91%    BMI 26.76 kg/m2         Temp (24hrs), Av.1 °F (36.7 °C), Min:97.7 °F (36.5 °C), Max:98.4 °F (36.9 °C)    Oxygen Therapy:  Oxygen Therapy  O2 Sat (%): 91 % (17)  Pulse via Oximetry: 101 beats per minute (17)  O2 Device: Nasal cannula (17)  O2 Flow Rate (L/min): 2 l/min (pt keeps taking off nasal cannula) (17)    Admission Weight: Last Weight   Weight: 178 lb 2.1 oz (80.8 kg) Weight: 183 lb 13.8 oz (83.4 kg)     Intake / Output / Drain:  Last 24 hrs.:   Intake/Output Summary (Last 24 hours) at 17 0659  Last data filed at 17 0656   Gross per 24 hour   Intake          2900.84 ml   Output             1700 ml   Net          1200.84 ml     EXAM:  Neuro: A&O  Resp: improved  CV: 's. S1/S2. GI: +BS Soft NT/ND  : voiding  Ext: erythematous BLE, skin flaky, peeling. R worse than L. Multiple dressings over weeping bullae. +3 pitting RLE, +2 pitting LLE.      CXR - mild pulmonary congestion       Recent Results (from the past 24 hour(s))   PTT    Collection Time: 17 10:01 AM   Result Value Ref Range    aPTT 59.6 (H) 22.1 - 32.5 sec    aPTT, therapeutic range     58.0 - 77.0 SECS   GLUCOSE, POC    Collection Time: 17 12:44 PM   Result Value Ref Range    Glucose (POC) 103 (H) 65 - 100 mg/dL    Performed by Sean Lam    GLUCOSE, POC    Collection Time: 17  6:37 PM   Result Value Ref Range Glucose (POC) 72 65 - 100 mg/dL    Performed by [facility] POC    Collection Time: 01/27/17 10:05 PM   Result Value Ref Range    Glucose (POC) 121 (H) 65 - 100 mg/dL    Performed by [address] [place] [address]    Collection Time: 01/28/17  3:38 AM   Result Value Ref Range    Sodium 131 (L) 136 - 145 mmol/L    Potassium 4.0 3.5 - 5.1 mmol/L    Chloride 93 (L) 97 - 108 mmol/L    CO2 30 21 - 32 mmol/L    Anion gap 8 5 - 15 mmol/L    Glucose 124 (H) 65 - 100 mg/dL    BUN 31 (H) 6 - 20 MG/DL    Creatinine 1.67 (H) 0.70 - 1.30 MG/DL    BUN/Creatinine ratio 19 12 - 20      GFR est AA 50 (L) >60 ml/min/1.73m2    GFR est non-AA 42 (L) >60 ml/min/1.73m2    Calcium 8.4 (L) 8.5 - 10.1 MG/DL    Bilirubin, total 0.5 0.2 - 1.0 MG/DL    ALT 7 (L) 12 - 78 U/L    AST 11 (L) 15 - 37 U/L    Alk.  phosphatase 79 45 - 117 U/L    Protein, total 6.3 (L) 6.4 - 8.2 g/dL    Albumin 2.4 (L) 3.5 - 5.0 g/dL    Globulin 3.9 2.0 - 4.0 g/dL    A-G Ratio 0.6 (L) 1.1 - 2.2     MAGNESIUM    Collection Time: 01/28/17  3:38 AM   Result Value Ref Range    Magnesium 1.9 1.6 - 2.4 mg/dL   CBC W/O DIFF    Collection Time: 01/28/17  3:38 AM   Result Value Ref Range    WBC 6.7 4.1 - 11.1 K/uL    RBC 4.13 4.10 - 5.70 M/uL    HGB 10.0 (L) 12.1 - 17.0 g/dL    HCT 31.7 (L) 36.6 - 50.3 %    MCV 76.8 (L) 80.0 - 99.0 FL    MCH 24.2 (L) 26.0 - 34.0 PG    MCHC 31.5 30.0 - 36.5 g/dL    RDW 16.2 (H) 11.5 - 14.5 %    PLATELET 446 (L) 060 - 400 K/uL   PTT    Collection Time: 01/28/17  3:38 AM   Result Value Ref Range    aPTT 72.5 (H) 22.1 - 32.5 sec    aPTT, therapeutic range     58.0 - 77.0 SECS       Current Facility-Administered Medications:     magnesium sulfate 1 g/100 ml IVPB (premix or compounded), 1 g, IntraVENous, ONCE, Kristopher Villarreal MD    carvedilol (COREG) tablet 3.125 mg, 3.125 mg, Oral, BID WITH MEALS, Ruth Peacock NP, 3.125 mg at 01/27/17 1842    amiodarone (CORDARONE) tablet 200 mg, 200 mg, Oral, Q12H, Chrissy Kruse T Polliard, NP, 200 mg at 01/27/17 2018    traZODone (DESYREL) tablet 100 mg, 100 mg, Oral, QHS PRN, Adam People, NP, 100 mg at 01/28/17 0003    polyethylene glycol (MIRALAX) packet 17 g, 17 g, Oral, DAILY, Magalene Hives Polliard, NP, 17 g at 01/27/17 0903    bacitracin 500 unit/gram packet 1 Packet, 1 Packet, Topical, PRN, Adam People, NP    atorvastatin (LIPITOR) tablet 10 mg, 10 mg, Oral, DAILY, Magalene Hives Polliard, NP, 10 mg at 01/27/17 0902    insulin glargine (LANTUS) injection 16 Units, 16 Units, SubCUTAneous, DAILY, Adam People, NP, 16 Units at 01/27/17 0903    glimepiride (AMARYL) tablet 2 mg, 2 mg, Oral, ACB, Magalene Hives Polliard, NP, 2 mg at 01/27/17 0706    pantoprazole (PROTONIX) tablet 40 mg, 40 mg, Oral, ACB, Magalene Hives Polliard, NP, 40 mg at 01/27/17 0706    ondansetron (ZOFRAN) injection 4 mg, 4 mg, IntraVENous, Q6H PRN, Florence Brown MD, 4 mg at 01/26/17 0903    senna-docusate (PERICOLACE) 8.6-50 mg per tablet 1 Tab, 1 Tab, Oral, DAILY, Florence Brown MD, 1 Tab at 01/27/17 0902    ceFAZolin (ANCEF) 1 g in 0.9% sodium chloride (MBP/ADV) 50 mL, 1 g, IntraVENous, Q8H, Helon Sacks., MD, Last Rate: 100 mL/hr at 01/28/17 0640, 1 g at 01/28/17 0640    0.9% sodium chloride infusion, 10 mL/hr, IntraVENous, CONTINUOUS, Florence Brown MD, Last Rate: 10 mL/hr at 01/28/17 0131, 10 mL/hr at 01/28/17 0131    sodium chloride (NS) flush 5-10 mL, 5-10 mL, InterCATHeter, Q8H, Florence Brown MD, 10 mL at 01/28/17 0644    sodium chloride (NS) flush 5-10 mL, 5-10 mL, InterCATHeter, PRN, Florence Brown MD, 10 mL at 01/26/17 2051    acetaminophen (TYLENOL) tablet 650 mg, 650 mg, Oral, Q6H PRN, Adam People, NP    aspirin delayed-release tablet 81 mg, 81 mg, Oral, DAILY, Ruben Peacock, MONICA, 81 mg at 01/27/17 0902    glucagon (GLUCAGEN) injection 1 mg, 1 mg, IntraMUSCular, PRN, Adam People, NP    glucose chewable tablet 16 g, 4 Tab, Oral, PRN, Adam People, NP    insulin lispro (HUMALOG) injection, , SubCUTAneous, AC&HS, Cordelia Pink NP, Stopped at 01/25/17 1630    magnesium oxide (MAG-OX) tablet 400 mg, 400 mg, Oral, DAILY, Alexus Peacock NP, 400 mg at 01/27/17 0902    nicotine (NICODERM CQ) 21 mg/24 hr patch 1 Patch, 1 Patch, TransDERmal, Q24H, Cordelia Pink NP, 1 Patch at 01/28/17 0644    0.9% sodium chloride infusion, 3 mL/hr, IntraVENous, CONTINUOUS, Cordelia Pink NP, Last Rate: 3 mL/hr at 01/27/17 0820, 3 mL/hr at 01/27/17 0820    sodium chloride (NS) flush 5-10 mL, 5-10 mL, IntraVENous, Q8H, Cordelia Pink NP, Stopped at 01/28/17 0600    sodium chloride (NS) flush 5-10 mL, 5-10 mL, IntraVENous, PRN, Alexus Peacock NP, 20 mL at 01/27/17 0401    albuterol (PROVENTIL VENTOLIN) nebulizer solution 2.5 mg, 2.5 mg, Nebulization, Q4H PRN, Cordelia Pink NP    heparin 25,000 units in D5W 250 ml infusion, 12-25 Units/kg/hr, IntraVENous, TITRATE, Cordelia Pink NP, Last Rate: 20.3 mL/hr at 01/28/17 0009, 25 Units/kg/hr at 01/28/17 0009    morphine injection 2 mg, 2 mg, IntraVENous, Q4H PRN, Cordelia Pink NP, 2 mg at 01/25/17 1610    diphenhydrAMINE (BENADRYL) capsule 25 mg, 25 mg, Oral, QHS PRN, Cordelia Pink NP, 25 mg at 01/27/17 0902    oxyCODONE-acetaminophen (PERCOCET) 5-325 mg per tablet 2 Tab, 2 Tab, Oral, Q4H PRN, Cordelia Pink NP, 2 Tab at 01/28/17 0028    milrinone (PRIMACOR) 20 MG/100 ML D5W infusion, 0.3 mcg/kg/min, IntraVENous, CONTINUOUS, Alexus Peacock NP, Last Rate: 7.3 mL/hr at 01/28/17 0132, 0.3 mcg/kg/min at 01/28/17 0132    ELECTROLYTE REPLACEMENT PROTOCOL, 1 Each, Other, PRN, Cordelia Pink NP     ASSESSMENT/PLAN:    ICM, Stage D, NYHA Class IV, EF 7%: Wean milrinone to 0.2 mcg/kg/min today. Continue to hold diuretics - PO over weekend PRN. On low dose carvedilol - hold ACE-I / ARB until BB tolerates and renal function stabilizes. Continue daily weights, strict I/O, Na+ restricted diet.  HF education.      Left pleural effusion: recent thoracentesis, -1200 mL. Check cytology, protein, and culture/gram stain.       Pulmonary hypertension: Weaning milrinone.      Hyponatremia: Due to HF vs. IVVD vs. Former ETOH abuse. Na 131 today. Holding diuretic - may resume PO PRN over the weekend. Appreciate renal input.       CAD with LM disease:  Cardiac MRI 11:00 am [facility] on Monday to assess viability for high risk PCI. Continue ASA, heparin gtt, statin, and low dose Coreg. Smoking cessation counseling in progress.      Atrial fibrillation: Continue Amio  gm BID. Continue heparin gtt until decision made re: PCI - will need warfarin long term.      DM Type II: A1C 13.5. DTC consulted - continue Lantus and Amaryl ACB per recommendations - appreciate input. Continue AccuChecks AC/HS, continue SSI. Consistent carb diet, continue diabetic education.      Thrombocytopenia: Platelets stable - 316 today - on ASA, heparin gtt. Monitor.      Tobacco abuse: Nicotine patch. Continue smoking cessation education.      Constipation: Resolved. Continue miralax, docusate.      Lower extremity venous stasis disease vs. Cellulitis: Improving on antibiotics. Continue cefazolin 1g q8h per ID - appreciate input. RLE ulcer wound pending. Keep weeping areas clean, dry, and covered.      Ppx: Continue pantoprazole for SUP, heparin gtt covers DVT ppx.  PICC placed yesterday.      Dispo: Remain in CVICU today.           Risk of morbidity and mortality - high  Medical decision making - high complexity      Signed By: Alexi Ignacio MD

## 2017-01-28 NOTE — PROGRESS NOTES
RNA F/U Noted     CC:  CHACORTA on CKD      -  Cr is better  - Na is stable   - legs hurt but he reports this is a chronic problem  - no sob/cp  - no f/c/ns     ROS: otherwise negative     Meds: Reviewed from EHR     PE:  VS noted  Gen: NAD  HEENT: OP clear   Chest: CTAB  Heart: rrr  Abdomen: soft, NT  Extrem: + edema  Neuro: axox3  Skin warm and dry     Labs:   Reviewed from EHR     Impression:  CHACORTA - better  Hyponatremia - stalbe  A/C systolic HF  Cellulitis  Edema  Pulm HTN     Plan:  Diuretics PRN if edema worse   ICU care and support

## 2017-01-28 NOTE — PROGRESS NOTES
Infectious Diseases Progress Note    Antibiotic Summary:  Levaquin  --   Vancomycin  --   Ancef    -- present      Subjective:     No new complaints. Leg swelling seems better    Objective:     Vitals:   Visit Vitals    BP 98/61 (BP 1 Location: Right arm, BP Patient Position: At rest)    Pulse (!) 102    Temp 98.4 °F (36.9 °C)    Resp 20    Ht 5' 9.5\" (1.765 m)    Wt 80.5 kg (177 lb 7.5 oz)    SpO2 95%    BMI 25.83 kg/m2        Tmax:  Temp (24hrs), Av.2 °F (36.8 °C), Min:97.7 °F (36.5 °C), Max:98.4 °F (36.9 °C)      Exam:  General appearance: alert, no distress  Lungs: rales at lung bases  Heart: RRR  Abdomen: soft and nontender  Left leg: erythema improving  Right leg: less weeping and looks drier    IV Lines: RIJ inserted     Labs:    Recent Labs      17   0356  17   0345  17   0434   WBC  8.0  6.3  6.2   HGB  10.8*  10.7*  11.3*   PLT  142*  139*  147*   BUN  34*  35*  30*   CREA  1.83*  2.23*  1.86*   TBILI  0.5  0.5  0.6   SGOT  15  10*  11*   AP  85  77  83     Culture right leg ulcers  = group C Streptococcus    Assessment:     1. Bilateral venous stasis disease of the LEs +/- cellulitis: Improving     2. OHD -- IHD/CAD; CHF; pulm HTN     3. CRF with acute exacerbation     4. NIDDM -- new diagnosis     5. Probable COPD -- heavy smoking since age 7      10. Anemia -- HC/MC -- positive family history of colon cancer -- may need GI W/U    Plan:     1. Continue Ancef      I'll check the patient again on Monday. Please call if problems arise over the weekend.     Gabbi Haas MD

## 2017-01-28 NOTE — PROGRESS NOTES
0815: Bedside and Verbal shift change report given to Tracee RN (oncoming nurse) by Magda Gunderson RN (offgoing nurse). Report included the following information SBAR, Kardex, Procedure Summary, Intake/Output, MAR, Accordion, Recent Results, Med Rec Status and Cardiac Rhythm Sinus Tach/ Sinus Rhythm.

## 2017-01-29 LAB
ALBUMIN SERPL BCP-MCNC: 2.5 G/DL (ref 3.5–5)
ALBUMIN/GLOB SERPL: 0.6 {RATIO} (ref 1.1–2.2)
ALP SERPL-CCNC: 81 U/L (ref 45–117)
ALT SERPL-CCNC: <6 U/L (ref 12–78)
ANION GAP BLD CALC-SCNC: 9 MMOL/L (ref 5–15)
APTT PPP: 76.1 SEC (ref 22.1–32.5)
AST SERPL W P-5'-P-CCNC: 12 U/L (ref 15–37)
BILIRUB SERPL-MCNC: 0.5 MG/DL (ref 0.2–1)
BNP SERPL-MCNC: 6168 PG/ML (ref 0–125)
BUN SERPL-MCNC: 37 MG/DL (ref 6–20)
BUN/CREAT SERPL: 21 (ref 12–20)
CALCIUM SERPL-MCNC: 8.8 MG/DL (ref 8.5–10.1)
CHLORIDE SERPL-SCNC: 94 MMOL/L (ref 97–108)
CO2 SERPL-SCNC: 29 MMOL/L (ref 21–32)
CREAT SERPL-MCNC: 1.76 MG/DL (ref 0.7–1.3)
ERYTHROCYTE [DISTWIDTH] IN BLOOD BY AUTOMATED COUNT: 16.2 % (ref 11.5–14.5)
GLOBULIN SER CALC-MCNC: 4.1 G/DL (ref 2–4)
GLUCOSE BLD STRIP.AUTO-MCNC: 105 MG/DL (ref 65–100)
GLUCOSE BLD STRIP.AUTO-MCNC: 137 MG/DL (ref 65–100)
GLUCOSE BLD STRIP.AUTO-MCNC: 175 MG/DL (ref 65–100)
GLUCOSE BLD STRIP.AUTO-MCNC: 200 MG/DL (ref 65–100)
GLUCOSE SERPL-MCNC: 96 MG/DL (ref 65–100)
HCT VFR BLD AUTO: 32.8 % (ref 36.6–50.3)
HGB BLD-MCNC: 10.3 G/DL (ref 12.1–17)
MAGNESIUM SERPL-MCNC: 2.1 MG/DL (ref 1.6–2.4)
MCH RBC QN AUTO: 24.1 PG (ref 26–34)
MCHC RBC AUTO-ENTMCNC: 31.4 G/DL (ref 30–36.5)
MCV RBC AUTO: 76.6 FL (ref 80–99)
PLATELET # BLD AUTO: 122 K/UL (ref 150–400)
POTASSIUM SERPL-SCNC: 4 MMOL/L (ref 3.5–5.1)
PROT SERPL-MCNC: 6.6 G/DL (ref 6.4–8.2)
RBC # BLD AUTO: 4.28 M/UL (ref 4.1–5.7)
SERVICE CMNT-IMP: ABNORMAL
SODIUM SERPL-SCNC: 132 MMOL/L (ref 136–145)
THERAPEUTIC RANGE,PTTT: ABNORMAL SECS (ref 58–77)
WBC # BLD AUTO: 5.8 K/UL (ref 4.1–11.1)

## 2017-01-29 PROCEDURE — 77030011256 HC DRSG MEPILEX <16IN NO BORD MOLN -A

## 2017-01-29 PROCEDURE — 82962 GLUCOSE BLOOD TEST: CPT

## 2017-01-29 PROCEDURE — 74011250637 HC RX REV CODE- 250/637: Performed by: NURSE PRACTITIONER

## 2017-01-29 PROCEDURE — 83880 ASSAY OF NATRIURETIC PEPTIDE: CPT | Performed by: NURSE PRACTITIONER

## 2017-01-29 PROCEDURE — 65610000003 HC RM ICU SURGICAL

## 2017-01-29 PROCEDURE — 85730 THROMBOPLASTIN TIME PARTIAL: CPT | Performed by: NURSE PRACTITIONER

## 2017-01-29 PROCEDURE — 74011250636 HC RX REV CODE- 250/636: Performed by: INTERNAL MEDICINE

## 2017-01-29 PROCEDURE — 74011250636 HC RX REV CODE- 250/636: Performed by: THORACIC SURGERY (CARDIOTHORACIC VASCULAR SURGERY)

## 2017-01-29 PROCEDURE — 74011000258 HC RX REV CODE- 258: Performed by: INTERNAL MEDICINE

## 2017-01-29 PROCEDURE — 80053 COMPREHEN METABOLIC PANEL: CPT | Performed by: NURSE PRACTITIONER

## 2017-01-29 PROCEDURE — 74011636637 HC RX REV CODE- 636/637: Performed by: NURSE PRACTITIONER

## 2017-01-29 PROCEDURE — 74011250637 HC RX REV CODE- 250/637: Performed by: THORACIC SURGERY (CARDIOTHORACIC VASCULAR SURGERY)

## 2017-01-29 PROCEDURE — 85027 COMPLETE CBC AUTOMATED: CPT | Performed by: NURSE PRACTITIONER

## 2017-01-29 PROCEDURE — 74011250636 HC RX REV CODE- 250/636: Performed by: NURSE PRACTITIONER

## 2017-01-29 PROCEDURE — 36592 COLLECT BLOOD FROM PICC: CPT

## 2017-01-29 PROCEDURE — 83735 ASSAY OF MAGNESIUM: CPT | Performed by: NURSE PRACTITIONER

## 2017-01-29 PROCEDURE — 36415 COLL VENOUS BLD VENIPUNCTURE: CPT | Performed by: NURSE PRACTITIONER

## 2017-01-29 RX ORDER — BUMETANIDE 1 MG/1
2 TABLET ORAL 2 TIMES DAILY
Status: DISCONTINUED | OUTPATIENT
Start: 2017-01-29 | End: 2017-01-31

## 2017-01-29 RX ADMIN — BUMETANIDE 2 MG: 1 TABLET ORAL at 09:34

## 2017-01-29 RX ADMIN — AMIODARONE HYDROCHLORIDE 200 MG: 200 TABLET ORAL at 22:09

## 2017-01-29 RX ADMIN — SODIUM CHLORIDE 10 ML/HR: 900 INJECTION, SOLUTION INTRAVENOUS at 14:36

## 2017-01-29 RX ADMIN — MILRINONE LACTATE 0.2 MCG/KG/MIN: 200 INJECTION, SOLUTION INTRAVENOUS at 18:14

## 2017-01-29 RX ADMIN — CEFAZOLIN SODIUM 1 G: 1 INJECTION, POWDER, FOR SOLUTION INTRAMUSCULAR; INTRAVENOUS at 14:36

## 2017-01-29 RX ADMIN — INSULIN GLARGINE 16 UNITS: 100 INJECTION, SOLUTION SUBCUTANEOUS at 09:26

## 2017-01-29 RX ADMIN — OXYCODONE HYDROCHLORIDE AND ACETAMINOPHEN 2 TABLET: 5; 325 TABLET ORAL at 09:34

## 2017-01-29 RX ADMIN — HEPARIN SODIUM AND DEXTROSE 25 UNITS/KG/HR: 10000; 5 INJECTION INTRAVENOUS at 01:17

## 2017-01-29 RX ADMIN — SODIUM CHLORIDE 3 ML/HR: 900 INJECTION, SOLUTION INTRAVENOUS at 21:00

## 2017-01-29 RX ADMIN — Medication 400 MG: at 09:29

## 2017-01-29 RX ADMIN — GLIMEPIRIDE 2 MG: 2 TABLET ORAL at 08:25

## 2017-01-29 RX ADMIN — CEFAZOLIN SODIUM 1 G: 1 INJECTION, POWDER, FOR SOLUTION INTRAMUSCULAR; INTRAVENOUS at 06:36

## 2017-01-29 RX ADMIN — TRAZODONE HYDROCHLORIDE 100 MG: 50 TABLET ORAL at 22:09

## 2017-01-29 RX ADMIN — ATORVASTATIN CALCIUM 10 MG: 10 TABLET, FILM COATED ORAL at 09:27

## 2017-01-29 RX ADMIN — PANTOPRAZOLE SODIUM 40 MG: 40 TABLET, DELAYED RELEASE ORAL at 08:25

## 2017-01-29 RX ADMIN — Medication 10 ML: at 16:58

## 2017-01-29 RX ADMIN — CARVEDILOL 3.12 MG: 3.12 TABLET, FILM COATED ORAL at 09:28

## 2017-01-29 RX ADMIN — POLYETHYLENE GLYCOL 3350 17 G: 17 POWDER, FOR SOLUTION ORAL at 09:27

## 2017-01-29 RX ADMIN — OXYCODONE HYDROCHLORIDE AND ACETAMINOPHEN 2 TABLET: 5; 325 TABLET ORAL at 14:37

## 2017-01-29 RX ADMIN — OXYCODONE HYDROCHLORIDE AND ACETAMINOPHEN 2 TABLET: 5; 325 TABLET ORAL at 04:24

## 2017-01-29 RX ADMIN — DOCUSATE SODIUM AND SENNOSIDES 1 TABLET: 8.6; 5 TABLET, FILM COATED ORAL at 09:28

## 2017-01-29 RX ADMIN — CARVEDILOL 3.12 MG: 3.12 TABLET, FILM COATED ORAL at 16:55

## 2017-01-29 RX ADMIN — AMIODARONE HYDROCHLORIDE 200 MG: 200 TABLET ORAL at 09:28

## 2017-01-29 RX ADMIN — BUMETANIDE 2 MG: 1 TABLET ORAL at 16:55

## 2017-01-29 RX ADMIN — CEFAZOLIN SODIUM 1 G: 1 INJECTION, POWDER, FOR SOLUTION INTRAMUSCULAR; INTRAVENOUS at 23:29

## 2017-01-29 RX ADMIN — OXYCODONE HYDROCHLORIDE AND ACETAMINOPHEN 2 TABLET: 5; 325 TABLET ORAL at 20:28

## 2017-01-29 RX ADMIN — INSULIN LISPRO 2 UNITS: 100 INJECTION, SOLUTION INTRAVENOUS; SUBCUTANEOUS at 17:06

## 2017-01-29 RX ADMIN — HEPARIN SODIUM AND DEXTROSE 25 UNITS/KG/HR: 10000; 5 INJECTION INTRAVENOUS at 13:12

## 2017-01-29 RX ADMIN — Medication 81 MG: at 09:28

## 2017-01-29 NOTE — PROGRESS NOTES
[facility]  Progress Note      Date: 2017     Admit Date: 2017    Mr. Juwan Sanders is a 61year old male patient admitted who was admitted to [facility] on  and transferred to [facility] on  for further evaluation of severe left main and 3 vessel CAD and systolic heart failure (EF 5-10%).         Subjective:   No events overnight. Resting comfortably. Up on weight and fluid status over the last 24 hours. Sat's dropped last night when he slept. Heparin, milrinone 0.2, Amio 200 BID, Coreg 3.125 BID, ASA 81 QD, Bumex held, lipitor, creatinine 1.8.       Objective:     Visit Vitals    /69    Pulse 96    Temp 98 °F (36.7 °C)    Resp 23    Ht 5' 9.5\" (1.765 m)    Wt 184 lb 15.5 oz (83.9 kg)    SpO2 (!) 89%    BMI 26.92 kg/m2       Temp (24hrs), Av.1 °F (36.7 °C), Min:97.9 °F (36.6 °C), Max:98.5 °F (36.9 °C)       Oxygen Therapy:  Oxygen Therapy  O2 Sat (%): (!) 89 % (17 0700)  Pulse via Oximetry: 95 beats per minute (17 0700)  O2 Device: Room air (17 0400)  O2 Flow Rate (L/min): 2 l/min (17 0800)      Admission Weight: Last Weight   Weight: 178 lb 2.1 oz (80.8 kg) Weight: 184 lb 15.5 oz (83.9 kg)     Intake / Output / Drain:  Last 24 hrs.:   Intake/Output Summary (Last 24 hours) at 17 0716  Last data filed at 17 0700   Gross per 24 hour   Intake          2208.08 ml   Output              850 ml   Net          1358.08 ml     EXAM:  Neuro: A&O x 3  Resp: some crackles in the bases  CV: tachy   GI: soft   : voiding  Ext: erythematous BLE, R worse than L, multiple dressings over weeping bullae. +2 pitting RLE, +1 pitting LLE.      CXR - mild pulmonary congestion      Recent Results (from the past 24 hour(s))   GLUCOSE, POC    Collection Time: 17  7:32 AM   Result Value Ref Range    Glucose (POC) 141 (H) 65 - 100 mg/dL    Performed by Sonia Youssef ([address])  Erinn    GLUCOSE, POC    Collection Time: 17  1:05 PM   Result Value Ref Range    Glucose (POC) 226 (H) 65 - 100 mg/dL    Performed by Royer Denney, POC    Collection Time: 01/28/17  4:48 PM   Result Value Ref Range    Glucose (POC) 129 (H) 65 - 100 mg/dL    Performed by Royer Denney, POC    Collection Time: 01/28/17 10:01 PM   Result Value Ref Range    Glucose (POC) 113 (H) 65 - 100 mg/dL    Performed by MARY JANE AMBROCIO    METABOLIC PANEL, COMPREHENSIVE    Collection Time: 01/29/17  4:18 AM   Result Value Ref Range    Sodium 132 (L) 136 - 145 mmol/L    Potassium 4.0 3.5 - 5.1 mmol/L    Chloride 94 (L) 97 - 108 mmol/L    CO2 29 21 - 32 mmol/L    Anion gap 9 5 - 15 mmol/L    Glucose 96 65 - 100 mg/dL    BUN 37 (H) 6 - 20 MG/DL    Creatinine 1.76 (H) 0.70 - 1.30 MG/DL    BUN/Creatinine ratio 21 (H) 12 - 20      GFR est AA 47 (L) >60 ml/min/1.73m2    GFR est non-AA 39 (L) >60 ml/min/1.73m2    Calcium 8.8 8.5 - 10.1 MG/DL    Bilirubin, total 0.5 0.2 - 1.0 MG/DL    ALT <6 (L) 12 - 78 U/L    AST 12 (L) 15 - 37 U/L    Alk.  phosphatase 81 45 - 117 U/L    Protein, total 6.6 6.4 - 8.2 g/dL    Albumin 2.5 (L) 3.5 - 5.0 g/dL    Globulin 4.1 (H) 2.0 - 4.0 g/dL    A-G Ratio 0.6 (L) 1.1 - 2.2     MAGNESIUM    Collection Time: 01/29/17  4:18 AM   Result Value Ref Range    Magnesium 2.1 1.6 - 2.4 mg/dL   CBC W/O DIFF    Collection Time: 01/29/17  4:18 AM   Result Value Ref Range    WBC 5.8 4.1 - 11.1 K/uL    RBC 4.28 4.10 - 5.70 M/uL    HGB 10.3 (L) 12.1 - 17.0 g/dL    HCT 32.8 (L) 36.6 - 50.3 %    MCV 76.6 (L) 80.0 - 99.0 FL    MCH 24.1 (L) 26.0 - 34.0 PG    MCHC 31.4 30.0 - 36.5 g/dL    RDW 16.2 (H) 11.5 - 14.5 %    PLATELET 141 (L) 757 - 400 K/uL   PTT    Collection Time: 01/29/17  4:18 AM   Result Value Ref Range    aPTT 76.1 (H) 22.1 - 32.5 sec    aPTT, therapeutic range     58.0 - 77.0 SECS       Current Facility-Administered Medications:     carvedilol (COREG) tablet 3.125 mg, 3.125 mg, Oral, BID WITH MEALS, George Peacock NP, 3.125 mg at 01/28/17 7619   amiodarone (CORDARONE) tablet 200 mg, 200 mg, Oral, Q12H, Tierra Peacock, NP, 200 mg at 01/28/17 2207    traZODone (DESYREL) tablet 100 mg, 100 mg, Oral, QHS PRN, Bossman German, MONICA, 100 mg at 01/28/17 2346    polyethylene glycol (MIRALAX) packet 17 g, 17 g, Oral, DAILY, Kaylae Alissa Riverod, NP, 17 g at 01/28/17 0844    bacitracin 500 unit/gram packet 1 Packet, 1 Packet, Topical, PRN, Bossman German, NP    atorvastatin (LIPITOR) tablet 10 mg, 10 mg, Oral, DAILY, Tierra Peacock, NP, 10 mg at 01/28/17 0845    insulin glargine (LANTUS) injection 16 Units, 16 Units, SubCUTAneous, DAILY, Tierra Peacock NP, 16 Units at 01/28/17 0842    glimepiride (AMARYL) tablet 2 mg, 2 mg, Oral, ACB, Tierra Peacock, NP, 2 mg at 01/28/17 0737    pantoprazole (PROTONIX) tablet 40 mg, 40 mg, Oral, ACB, Tierra Peacock, NP, 40 mg at 01/28/17 0733    ondansetron (ZOFRAN) injection 4 mg, 4 mg, IntraVENous, Q6H PRN, Sam Pacheco MD, 4 mg at 01/26/17 0903    senna-docusate (PERICOLACE) 8.6-50 mg per tablet 1 Tab, 1 Tab, Oral, DAILY, Sam Pacheco MD, 1 Tab at 01/28/17 0845    ceFAZolin (ANCEF) 1 g in 0.9% sodium chloride (MBP/ADV) 50 mL, 1 g, IntraVENous, Q8H, Cosme Lay MD, Last Rate: 100 mL/hr at 01/29/17 0636, 1 g at 01/29/17 0636    0.9% sodium chloride infusion, 10 mL/hr, IntraVENous, CONTINUOUS, Sam Pacheco MD, Last Rate: 10 mL/hr at 01/28/17 2047, 10 mL/hr at 01/28/17 2047    sodium chloride (NS) flush 5-10 mL, 5-10 mL, InterCATHeter, Q8H, Sam Pacheco MD, Stopped at 01/28/17 1400    sodium chloride (NS) flush 5-10 mL, 5-10 mL, InterCATHeter, PRN, Sam Pacheco MD, 10 mL at 01/26/17 2051    acetaminophen (TYLENOL) tablet 650 mg, 650 mg, Oral, Q6H PRN, Bossman German NP    aspirin delayed-release tablet 81 mg, 81 mg, Oral, DAILY, Tierra Peacock NP, 81 mg at 01/28/17 0844    glucagon (GLUCAGEN) injection 1 mg, 1 mg, IntraMUSCular, PRN, Bossman German NP    glucose chewable tablet 16 g, 4 Tab, Oral, PRN, Quin Wasserman NP    insulin lispro (HUMALOG) injection, , SubCUTAneous, AC&HS, Quin Wasserman NP, Stopped at 01/28/17 1630    magnesium oxide (MAG-OX) tablet 400 mg, 400 mg, Oral, DAILY, Floyd Scales Polliard, NP, 400 mg at 01/28/17 0845    nicotine (NICODERM CQ) 21 mg/24 hr patch 1 Patch, 1 Patch, TransDERmal, Q24H, Quin Wasserman NP, 1 Patch at 01/29/17 0610    0.9% sodium chloride infusion, 3 mL/hr, IntraVENous, CONTINUOUS, Quin Wasserman NP, Last Rate: 3 mL/hr at 01/28/17 2044, 3 mL/hr at 01/28/17 2044    sodium chloride (NS) flush 5-10 mL, 5-10 mL, IntraVENous, Q8H, Quin Wasserman NP, Stopped at 01/28/17 0600    sodium chloride (NS) flush 5-10 mL, 5-10 mL, IntraVENous, PRN, Quin Wasserman NP, 20 mL at 01/27/17 0401    albuterol (PROVENTIL VENTOLIN) nebulizer solution 2.5 mg, 2.5 mg, Nebulization, Q4H PRN, Quin Wasserman NP    heparin 25,000 units in D5W 250 ml infusion, 12-25 Units/kg/hr, IntraVENous, TITRATE, Quin Wasserman NP, Last Rate: 20.3 mL/hr at 01/29/17 0545, 25 Units/kg/hr at 01/29/17 0545    morphine injection 2 mg, 2 mg, IntraVENous, Q4H PRN, Quin Wasserman NP, 2 mg at 01/25/17 1610    diphenhydrAMINE (BENADRYL) capsule 25 mg, 25 mg, Oral, QHS PRN, Quin Wasserman NP, 25 mg at 01/27/17 0902    oxyCODONE-acetaminophen (PERCOCET) 5-325 mg per tablet 2 Tab, 2 Tab, Oral, Q4H PRN, Quin Wasserman NP, 2 Tab at 01/29/17 0424    milrinone (PRIMACOR) 20 MG/100 ML D5W infusion, 0.3 mcg/kg/min, IntraVENous, CONTINUOUS, Quin Wasserman NP, Last Rate: 4.9 mL/hr at 01/28/17 1953, 0.2 mcg/kg/min at 01/28/17 1953    ELECTROLYTE REPLACEMENT PROTOCOL, 1 Each, Other, PRN, Quin Wasserman NP    ASSESSMENT/PLAN:    ICM, Stage D, NYHA Class IV, EF 7%: milrinone 0.2 mcg/kg/min (holding off on weaning milrinone due to fluid and weight gain), carvedilol 3.125 BID, starting Bumex 2 mg PO BID due to fluid retention and weight gain.  Holding ACE-I / ARB until tolerates BB and renal function stabilizes. Continue daily weights, strict I/O, Na+ restricted diet. HF education.       Left pleural effusion: recent thoracentesis, -1200 mL. Check cytology, protein, and culture/gram stain.      Pulmonary hypertension: milrinone.       Hyponatremia: Due to HF vs. IVVD vs. Former ETOH abuse. Na 132 today. Appreciate renal input.       CAD with LM disease:  Cardiac MRI 11:00 am at [facility] on Monday to assess viability for high risk PCI. Continue ASA, heparin gtt, statin, and low dose Coreg. Smoking cessation counseling in progress.       Atrial fibrillation: Continue Amio  gm BID. Continue heparin gtt until decision made re: PCI - will need warfarin long term.       DM Type II: A1C 13.5. DTC consulted - continue Lantus and Amaryl ACB per recommendations - appreciate input. Continue AccuChecks AC/HS, continue SSI. Consistent carb diet, continue diabetic education.       Thrombocytopenia: Platelets stable - 047 today - on ASA, heparin gtt. Monitor.       Tobacco abuse: Nicotine patch. Continue smoking cessation education.       Constipation: Resolved. Continue miralax, docusate.       Lower extremity venous stasis disease vs. Cellulitis: Improving on antibiotics. Continue cefazolin 1g q8h per ID - appreciate input. RLE ulcer wound pending. Keep weeping areas clean, dry, and covered.       Ppx: Continue pantoprazole for SUP, heparin gtt covers DVT ppx.  PICC placed yesterday.       Dispo: Remain in CVICU today.             Risk of morbidity and mortality - high  Medical decision making - high complexity           Signed By: Anna Chun MD

## 2017-01-29 NOTE — PROGRESS NOTES
2000 Received report and assumed care of patient. Drips verified. Medicated with Percocet for bilateral leg pain. 2100 Up to [facility] with assistance one person to help with IV lines. Patient had soft formed BM per patient. [id number] Dozing intermittently as well as watching TV.  0400 Noted during the night when patient sleeping pulse oxygen saturation drops to 85-89 % but comes back to baseline 93-96 %  on own.  0845 Bedside and Verbal shift change report given to Arturo Kaur (oncoming nurse) by Bran Huang (offgoing nurse). Report included the following information SBAR, Kardex, Procedure Summary, Intake/Output, Recent Results and Cardiac Rhythm Sinus rhythm with occasional PVCs noted. Deacon Davis

## 2017-01-29 NOTE — PROGRESS NOTES
Bedside shift change report given to Beba Armenta (oncoming nurse) by Bradley Russell (offgoing nurse). Report included the following information SBAR, Kardex, Intake/Output, MAR and Recent Results.

## 2017-01-29 NOTE — PROGRESS NOTES
RNA F/U Noted      CC:  CHACORTA on CKD       - Cr is stable   - Na is stable   - back on bumex  - no sob/cp  - no f/c/ns      ROS: otherwise negative      Meds: Reviewed from EHR      PE:  VS noted  Gen: NAD  HEENT: OP clear   Chest: CTAB  Heart: rrr  Abdomen: soft, NT  Extrem: + edema  Neuro: axox3  Skin warm and dry      Labs:   Reviewed from EHR      Impression:  CHACORTA - better  Hyponatremia - stable  A/C systolic HF  Cellulitis  Edema  Pulm HTN     Plan:  Continue diuretics   ICU care and support

## 2017-01-29 NOTE — PROGRESS NOTES
Problem: Falls - Risk of  Goal: *Absence of falls  Outcome: Progressing Towards Goal  Patient has been without fall this shift. Goal: *Knowledge of fall prevention  Outcome: Progressing Towards Goal  Bed in lowest position. Call bell, bedside table within easy reach. Patient uses call bell appropriately for assistance. Problem: Heart Failure: Day 5  Goal: Off Pathway (Use only if patient is Off Pathway)  Outcome: Progressing Towards Goal  Patient is slowly being weaned off Primacor drip. Patient increasing activity to walk in unit occasionally. Patient is aware of importance of monitoring weight daily, following up with MDS even though has not done so in past. Patient needs reinforcement with use of Coreg, side effects. Problem: Impaired Skin Integrity/Pressure Ulcer Treatment  Goal: *Improvement of existing pressure ulcer  Outcome: Progressing Towards Goal  Bilateral lower leg wounds slowly improving with daily wound care. Right leg weeping less.

## 2017-01-29 NOTE — PROGRESS NOTES
Contacted Transport Team at 56. Spoke with Aundrea Adkins RN about plans to transport patient to [facility] in AM. Arrival time 1030.

## 2017-01-29 NOTE — PROGRESS NOTES
Problem: Diabetes Self-Management  Goal: *Disease process and treatment process  Define diabetes and identify own type of diabetes; list 3 options for treating diabetes. Outcome: Progressing Towards Goal  Patient verbalized understanding of a slower healing process of his leg wounds due to his diabetes. Goal: *Incorporating nutritional management into lifestyle  Describe effect of type, amount and timing of food on blood glucose; list 3 methods for planning meals. Outcome: Progressing Towards Goal  Patient educated on importance of eating well-balanced, nutritious meals 3x a day. Goal: *Incorporating physical activity into lifestyle  Outcome: Progressing Towards Goal  Patient educated on importance of walking or exercising everyday for at least 30 minutes a day  Goal: *Developing strategies to promote health/change behavior  Outcome: Progressing Towards Goal  Patient is becoming more conscientious about what he is eating as evidenced by refusing salted peanuts as a snack because of the sodium level. Goal: *Using medications safely  State effect of diabetes medications on diabetes; name diabetes medication taking, action and side effects. Outcome: Progressing Towards Goal  Patient educated on long acting insulin coverage and short acting. Goal: *Monitoring blood glucose, interpreting and using results  Identify recommended blood glucose targets and personal targets. Outcome: Progressing Towards Goal  Patient educated on healthy blood glucose target levels. Goal: *Prevention, detection, treatment of acute complications  List symptoms of hyper- and hypoglycemia; describe how to treat low blood sugar and actions for lowering high blood glucose level. Outcome: Progressing Towards Goal  Educated on signs of hypoglycemia such as perspiration, dizziness and nausea.   Goal: *Prevention, detection and treatment of chronic complications  Define the natural course of diabetes and describe the relationship of blood glucose levels to long term complications of diabetes.    Outcome: Progressing Towards Goal  Educated on the importance of monitoring blood glucose levels between meals to maintain blood glucose within healthy levels  Goal: *Developing strategies to address psychosocial issues  Describe feelings about living with diabetes; identify support needed and support network   Outcome: Progressing Towards Goal  Patient seems motivated to learn to eat healthier    Problem: Falls - Risk of  Goal: *Absence of falls  Outcome: Progressing Towards Goal  No falls on my shift  Goal: *Knowledge of fall prevention  Outcome: Progressing Towards Goal  Patient calls for assistance    Problem: Pressure Ulcer - Risk of  Goal: *Prevention of pressure ulcer  Outcome: Progressing Towards Goal  No signs of pressure ulcers    Problem: Impaired Skin Integrity/Pressure Ulcer Treatment  Goal: *Improvement of existing pressure ulcer  Outcome: Progressing Towards Goal  No signs of pressure ulcers    Problem: Discharge Planning  Goal: *Discharge to safe environment  Outcome: Progressing Towards Goal  Patient understands that home health will be needed when discharged

## 2017-01-30 ENCOUNTER — APPOINTMENT (OUTPATIENT)
Dept: GENERAL RADIOLOGY | Age: 67
DRG: 215 | End: 2017-01-30
Attending: INTERNAL MEDICINE
Payer: SELF-PAY

## 2017-01-30 ENCOUNTER — APPOINTMENT (OUTPATIENT)
Dept: GENERAL RADIOLOGY | Age: 67
DRG: 215 | End: 2017-01-30
Attending: PHYSICIAN ASSISTANT
Payer: SELF-PAY

## 2017-01-30 LAB
ALBUMIN SERPL BCP-MCNC: 2.5 G/DL (ref 3.5–5)
ALBUMIN/GLOB SERPL: 0.6 {RATIO} (ref 1.1–2.2)
ALP SERPL-CCNC: 85 U/L (ref 45–117)
ALT SERPL-CCNC: 8 U/L (ref 12–78)
ANION GAP BLD CALC-SCNC: 8 MMOL/L (ref 5–15)
APTT PPP: 72.9 SEC (ref 22.1–32.5)
AST SERPL W P-5'-P-CCNC: 14 U/L (ref 15–37)
BACTERIA SPEC CULT: NORMAL
BILIRUB SERPL-MCNC: 0.5 MG/DL (ref 0.2–1)
BNP SERPL-MCNC: 6094 PG/ML (ref 0–125)
BUN SERPL-MCNC: 40 MG/DL (ref 6–20)
BUN/CREAT SERPL: 21 (ref 12–20)
CALCIUM SERPL-MCNC: 8.5 MG/DL (ref 8.5–10.1)
CHLORIDE SERPL-SCNC: 94 MMOL/L (ref 97–108)
CO2 SERPL-SCNC: 29 MMOL/L (ref 21–32)
CREAT SERPL-MCNC: 1.94 MG/DL (ref 0.7–1.3)
ERYTHROCYTE [DISTWIDTH] IN BLOOD BY AUTOMATED COUNT: 16.3 % (ref 11.5–14.5)
GLOBULIN SER CALC-MCNC: 3.9 G/DL (ref 2–4)
GLUCOSE BLD STRIP.AUTO-MCNC: 106 MG/DL (ref 65–100)
GLUCOSE BLD STRIP.AUTO-MCNC: 107 MG/DL (ref 65–100)
GLUCOSE BLD STRIP.AUTO-MCNC: 127 MG/DL (ref 65–100)
GLUCOSE BLD STRIP.AUTO-MCNC: 88 MG/DL (ref 65–100)
GLUCOSE SERPL-MCNC: 82 MG/DL (ref 65–100)
GRAM STN SPEC: NORMAL
GRAM STN SPEC: NORMAL
HCT VFR BLD AUTO: 31.8 % (ref 36.6–50.3)
HEMOCCULT STL QL: NEGATIVE
HGB BLD-MCNC: 10 G/DL (ref 12.1–17)
MAGNESIUM SERPL-MCNC: 2 MG/DL (ref 1.6–2.4)
MCH RBC QN AUTO: 24 PG (ref 26–34)
MCHC RBC AUTO-ENTMCNC: 31.4 G/DL (ref 30–36.5)
MCV RBC AUTO: 76.4 FL (ref 80–99)
PLATELET # BLD AUTO: 111 K/UL (ref 150–400)
POTASSIUM SERPL-SCNC: 3.9 MMOL/L (ref 3.5–5.1)
PROT SERPL-MCNC: 6.4 G/DL (ref 6.4–8.2)
RBC # BLD AUTO: 4.16 M/UL (ref 4.1–5.7)
SERVICE CMNT-IMP: ABNORMAL
SERVICE CMNT-IMP: NORMAL
SERVICE CMNT-IMP: NORMAL
SODIUM SERPL-SCNC: 131 MMOL/L (ref 136–145)
THERAPEUTIC RANGE,PTTT: ABNORMAL SECS (ref 58–77)
WBC # BLD AUTO: 5.7 K/UL (ref 4.1–11.1)

## 2017-01-30 PROCEDURE — 36415 COLL VENOUS BLD VENIPUNCTURE: CPT | Performed by: NURSE PRACTITIONER

## 2017-01-30 PROCEDURE — 74011250636 HC RX REV CODE- 250/636: Performed by: THORACIC SURGERY (CARDIOTHORACIC VASCULAR SURGERY)

## 2017-01-30 PROCEDURE — 74011000258 HC RX REV CODE- 258: Performed by: INTERNAL MEDICINE

## 2017-01-30 PROCEDURE — 85027 COMPLETE CBC AUTOMATED: CPT | Performed by: NURSE PRACTITIONER

## 2017-01-30 PROCEDURE — 74011250636 HC RX REV CODE- 250/636: Performed by: INTERNAL MEDICINE

## 2017-01-30 PROCEDURE — 74011250637 HC RX REV CODE- 250/637: Performed by: THORACIC SURGERY (CARDIOTHORACIC VASCULAR SURGERY)

## 2017-01-30 PROCEDURE — 74011250637 HC RX REV CODE- 250/637: Performed by: NURSE PRACTITIONER

## 2017-01-30 PROCEDURE — 77030011256 HC DRSG MEPILEX <16IN NO BORD MOLN -A

## 2017-01-30 PROCEDURE — 74000 XR ABD PORT  1 V: CPT

## 2017-01-30 PROCEDURE — 74011250636 HC RX REV CODE- 250/636: Performed by: NURSE PRACTITIONER

## 2017-01-30 PROCEDURE — 86022 PLATELET ANTIBODIES: CPT | Performed by: INTERNAL MEDICINE

## 2017-01-30 PROCEDURE — 97116 GAIT TRAINING THERAPY: CPT

## 2017-01-30 PROCEDURE — 74011636637 HC RX REV CODE- 636/637: Performed by: NURSE PRACTITIONER

## 2017-01-30 PROCEDURE — 85730 THROMBOPLASTIN TIME PARTIAL: CPT | Performed by: NURSE PRACTITIONER

## 2017-01-30 PROCEDURE — 82272 OCCULT BLD FECES 1-3 TESTS: CPT | Performed by: PHYSICIAN ASSISTANT

## 2017-01-30 PROCEDURE — 71020 XR CHEST PA LAT: CPT

## 2017-01-30 PROCEDURE — 82962 GLUCOSE BLOOD TEST: CPT

## 2017-01-30 PROCEDURE — 83880 ASSAY OF NATRIURETIC PEPTIDE: CPT | Performed by: NURSE PRACTITIONER

## 2017-01-30 PROCEDURE — 80053 COMPREHEN METABOLIC PANEL: CPT | Performed by: NURSE PRACTITIONER

## 2017-01-30 PROCEDURE — 65660000000 HC RM CCU STEPDOWN

## 2017-01-30 PROCEDURE — 83735 ASSAY OF MAGNESIUM: CPT | Performed by: NURSE PRACTITIONER

## 2017-01-30 RX ADMIN — DOCUSATE SODIUM AND SENNOSIDES 1 TABLET: 8.6; 5 TABLET, FILM COATED ORAL at 09:59

## 2017-01-30 RX ADMIN — AMIODARONE HYDROCHLORIDE 200 MG: 200 TABLET ORAL at 20:46

## 2017-01-30 RX ADMIN — AMIODARONE HYDROCHLORIDE 200 MG: 200 TABLET ORAL at 09:59

## 2017-01-30 RX ADMIN — BUMETANIDE 2 MG: 1 TABLET ORAL at 20:39

## 2017-01-30 RX ADMIN — CARVEDILOL 3.12 MG: 3.12 TABLET, FILM COATED ORAL at 20:39

## 2017-01-30 RX ADMIN — SODIUM CHLORIDE 10 ML/HR: 900 INJECTION, SOLUTION INTRAVENOUS at 14:04

## 2017-01-30 RX ADMIN — PANTOPRAZOLE SODIUM 40 MG: 40 TABLET, DELAYED RELEASE ORAL at 07:14

## 2017-01-30 RX ADMIN — MILRINONE LACTATE 0.2 MCG/KG/MIN: 200 INJECTION, SOLUTION INTRAVENOUS at 10:03

## 2017-01-30 RX ADMIN — CEFAZOLIN SODIUM 1 G: 1 INJECTION, POWDER, FOR SOLUTION INTRAMUSCULAR; INTRAVENOUS at 07:11

## 2017-01-30 RX ADMIN — INSULIN GLARGINE 16 UNITS: 100 INJECTION, SOLUTION SUBCUTANEOUS at 09:59

## 2017-01-30 RX ADMIN — CEFAZOLIN SODIUM 1 G: 1 INJECTION, POWDER, FOR SOLUTION INTRAMUSCULAR; INTRAVENOUS at 16:00

## 2017-01-30 RX ADMIN — TRAZODONE HYDROCHLORIDE 100 MG: 50 TABLET ORAL at 23:03

## 2017-01-30 RX ADMIN — OXYCODONE HYDROCHLORIDE AND ACETAMINOPHEN 2 TABLET: 5; 325 TABLET ORAL at 10:03

## 2017-01-30 RX ADMIN — BUMETANIDE 2 MG: 1 TABLET ORAL at 09:59

## 2017-01-30 RX ADMIN — ATORVASTATIN CALCIUM 10 MG: 10 TABLET, FILM COATED ORAL at 09:59

## 2017-01-30 RX ADMIN — GLIMEPIRIDE 2 MG: 2 TABLET ORAL at 07:14

## 2017-01-30 RX ADMIN — Medication 81 MG: at 09:59

## 2017-01-30 RX ADMIN — OXYCODONE HYDROCHLORIDE AND ACETAMINOPHEN 2 TABLET: 5; 325 TABLET ORAL at 04:23

## 2017-01-30 RX ADMIN — Medication 10 ML: at 23:07

## 2017-01-30 RX ADMIN — HEPARIN SODIUM AND DEXTROSE 25 UNITS/KG/HR: 10000; 5 INJECTION INTRAVENOUS at 01:58

## 2017-01-30 RX ADMIN — POLYETHYLENE GLYCOL 3350 17 G: 17 POWDER, FOR SOLUTION ORAL at 09:59

## 2017-01-30 RX ADMIN — HEPARIN SODIUM AND DEXTROSE 25 UNITS/KG/HR: 10000; 5 INJECTION INTRAVENOUS at 14:03

## 2017-01-30 RX ADMIN — CEFAZOLIN SODIUM 1 G: 1 INJECTION, POWDER, FOR SOLUTION INTRAMUSCULAR; INTRAVENOUS at 23:03

## 2017-01-30 RX ADMIN — CARVEDILOL 3.12 MG: 3.12 TABLET, FILM COATED ORAL at 09:59

## 2017-01-30 RX ADMIN — OXYCODONE HYDROCHLORIDE AND ACETAMINOPHEN 2 TABLET: 5; 325 TABLET ORAL at 16:09

## 2017-01-30 RX ADMIN — OXYCODONE HYDROCHLORIDE AND ACETAMINOPHEN 2 TABLET: 5; 325 TABLET ORAL at 20:46

## 2017-01-30 RX ADMIN — Medication 400 MG: at 09:59

## 2017-01-30 NOTE — WOUND CARE
Wound and Skin Consult / reassessment for bilateral lower extremities weeping wounds- reviewed chart, assessed patient and spoke with nurse, Solo Leslie. Solo Leslie present for assessment. Patient alert but question orientation at times. Complains of pain with leg movement but otherwise no pain. No change in wound recommendations, due to improvement.     Assessment-supine in bed with head of bed elevated.     1. Sacrum is clear. 2. Heels are dry but geri. 3. Left and right lower leg with rash and wounds: right worse than left. Bilateral pulses are palpable. Left lower leg red rash is: 24.0cm x 20.0cm x 0.1cm / achilles with scattered minute sloughed areas. Leg not weeping.  right worse red rash: 30.0cm x 26.0cmx 0.1cm / right medial lower leg and left posterior lower leg improving but  still angry looking. Cleaned both legs with Makayla Klenz Spray, applied small amount of Carrasyn Gel to sloughed areas and a mepilex Border dressing. Patient and nurses agree legs are much improved.        Recommendations-     1. Patient is on a Total Care Upgrade for continuous air flow and pressure redistribution. Use only Air Flow chuxs and draw sheet under patient. 2. Reposition: Continue to turn every 1-2hrs to reposition for pressure relief, with pillows to protect bony prominences/float heels / needs assistance with legs. 3.Continence/ mendoza in and asks for bedpan. 4. Continue to monitor pain, nutrition and skin assessment.   5. Wound Care-  - daily clean bilateral lower extremities with Makayla Klenz Spray, dry, apply thin film of carrasyn gel to right shin, right medial and left lower posterior leg and left achilles.     Brody Yang RN/WOCN

## 2017-01-30 NOTE — PROGRESS NOTES
NUTRITION COMPLETE ASSESSMENT    RECOMMENDATIONS:   Continue current diet     Interventions/Plan:   Food/Nutrient Delivery:  Modify diet/texture/consistency/nutrients            Assessment:   Reason for Assessment:   [x]Reassessment     Diet: Cardiac Consistent carb 1800 kcal, 2000 ml Fluid Restriciton  Supplements: none  Nutritionally Significant Medications: [x] Reviewed & Includes: Bumex, Lantus, correction scale insulin, magnesium oxide, Miralax, Pericolace  Meal Intake: Patient Vitals for the past 100 hrs:   % Diet Eaten   01/30/17 0800 (P) 100 %   01/29/17 1800 100 %   01/29/17 1230 100 %   01/29/17 0800 100 %   01/28/17 1800 100 %   01/28/17 1303 100 %   01/28/17 0800 200 %   01/27/17 1600 100 %   01/27/17 1200 100 %   01/27/17 0900 100 %   01/26/17 1800 100 %   01/26/17 0802 5 %       Subjective:  Now that I can breathe and taste my food better I'm able to eat. Does not always receive tray with all selected items. Objective:  Chart reviewed for follow-up; discussed with RN. Noted: ICM, Stage D, NYHA Class IV, EF 7%-medical management; new dx DM 2; CHACORTA on CKD; SIADH. Appetite is very good (see above). Patient reports having BM's now which helps. No nutritional intervention indicated at this time. Weight up 7.6 kg in the past week; being diuresed. Remains edematous. Hyponatremia-renal following. Fluid restriction ordered. Blood sugar fairly well controlled. Estimated Nutrition Needs:   Kcals/day: 2030 Kcals/day  Protein:  (77-92 (1.0-1.2g/kg))  Fluid: 1540 ml (20 ml/kg)  Based On: [place] (MSJ x 1.3)  Weight Used: Actual wt    Pt expected to meet estimated nutrient needs:  [x]   Yes     []  No  [] Unable to predict at this time    Nutrition Diagnosis:   1. Inadequate oral food/beverage intake related to severe CAD and acute on chronic heart failure as evidenced by poor appetite PTA (now resolved).     2. Altered nutrition-related lab values related to new diagnosis DM 2 as evidenced by BG >200 mg/dl. Goals:     Consume at least 75% of meals for next 5-7 days. Monitoring & Evaluation:    - Total energy intake   - Glucose profile, Weight/weight change    Previous Nutrition Goals Met:  N/A  Previous Recommendations:      N/A    Education & Discharge Needs:   [x] None Identified   [] Identified and addressed    [x] Participated in care plan, discharge planning, and/or interdisciplinary rounds        Cultural, Druze and ethnic food preferences identified:   None    Skin Integrity: [x]Intact  []Other  Edema: []None [x] NP Generalized, BUE's, 2+ BLE's  Last BM: 1/30  Food Allergies: [x]None []Other    Anthropometrics:    Weight Loss Metrics 1/30/2017 1/20/2017 1/20/2017 1/18/2017   Today's Wt 186 lb 8.2 oz - 178 lb 9.2 oz -   BMI - 27.54 kg/m2 - 25.99 kg/m2      Last 3 Recorded Weights in this Encounter    01/30/17 0030 01/30/17 0725 01/30/17 1154   Weight: 85 kg (187 lb 6.3 oz) 84.6 kg (186 lb 8.2 oz) 84.6 kg (186 lb 8.2 oz)      Weight Source: Bed  Height: 5' 9\" (175.3 cm),    Body mass index is 27.54 kg/(m^2).   IBW : 72.6 kg (160 lb), % IBW (Calculated): 116.57 %   ,      Labs:  Lab Results   Component Value Date/Time    Sodium 131 01/30/2017 04:16 AM    Potassium 3.9 01/30/2017 04:16 AM    Chloride 94 01/30/2017 04:16 AM    CO2 29 01/30/2017 04:16 AM    Glucose 82 01/30/2017 04:16 AM    BUN 40 01/30/2017 04:16 AM    Creatinine 1.94 01/30/2017 04:16 AM    Calcium 8.5 01/30/2017 04:16 AM    Magnesium 2.0 01/30/2017 04:16 AM    Phosphorus 2.5 01/23/2017 12:09 PM    Albumin 2.5 01/30/2017 04:16 AM     Lab Results   Component Value Date/Time    Hemoglobin A1c 13.5 01/19/2017 05:05 PM     Lab Results   Component Value Date/Time    Glucose (POC) 88 01/30/2017 07:16 AM         [facility], AMANDA TYLER

## 2017-01-30 NOTE — PROGRESS NOTES
[facility]   [address] [facility], [facility] [address], [facility]  Phone: [phone]   [phone]       Nephrology Progress Note  Magdaleno James.     1950     [id number]  Date of Admission : 1/20/2017 01/30/17    CC: Follow up for CKD/ARF      Assessment and Plan   CHACORTA on CKD :  - CHACORTA likely 2/2 cardiorenal syndrome + IVVD from diuretics   - Cr stable with diuresis . - He should not be off diuretics at this time. Continue Bumex 2 mg BID     Worsening Thrombocytopenia :  - although no prior Heparin exposure-- > still at risk for CECILIA  - CECILIA ab screen , if +ve --> REJI  - probably has some hypersplenism at baseline     Hyponatremia :  - combination of  CHF + SIADH from chronic alcoholism  - Na at 131 after 2 doses of Tolvaptan   - Diuresis w/ loops . AVOID THIAZIDES   - Tolvaptan when Na < 130    Acute on Chronic Systolic CHF w/ severe CMP  - echo with EF 5-10%, severely dilated LV  - Multivessel CAD : being evaluated for CABG   - weaning down on Milrinone     Cellulitis RLE w/ Pustular lesions : On Ancef per ID    Pulm HTN     Chronic smoker w/VENANCIO -PNA    Discussed with Nursing     Interval History:  Seen and examined. For cardiac MRI today. Bumex restarted yesterday after weight increased. Abdominal pain and distended   No cp, sob, n/v/d reported. Review of Systems: Pertinent items are noted in HPI.     Current Medications:   Current Facility-Administered Medications   Medication Dose Route Frequency    bumetanide (BUMEX) tablet 2 mg  2 mg Oral BID    carvedilol (COREG) tablet 3.125 mg  3.125 mg Oral BID WITH MEALS    amiodarone (CORDARONE) tablet 200 mg  200 mg Oral Q12H    traZODone (DESYREL) tablet 100 mg  100 mg Oral QHS PRN    polyethylene glycol (MIRALAX) packet 17 g  17 g Oral DAILY    bacitracin 500 unit/gram packet 1 Packet  1 Packet Topical PRN    atorvastatin (LIPITOR) tablet 10 mg  10 mg Oral DAILY    insulin glargine (LANTUS) injection 16 Units  16 Units SubCUTAneous DAILY    glimepiride (AMARYL) tablet 2 mg  2 mg Oral ACB    pantoprazole (PROTONIX) tablet 40 mg  40 mg Oral ACB    ondansetron (ZOFRAN) injection 4 mg  4 mg IntraVENous Q6H PRN    senna-docusate (PERICOLACE) 8.6-50 mg per tablet 1 Tab  1 Tab Oral DAILY    ceFAZolin (ANCEF) 1 g in 0.9% sodium chloride (MBP/ADV) 50 mL  1 g IntraVENous Q8H    0.9% sodium chloride infusion  10 mL/hr IntraVENous CONTINUOUS    sodium chloride (NS) flush 5-10 mL  5-10 mL InterCATHeter Q8H    sodium chloride (NS) flush 5-10 mL  5-10 mL InterCATHeter PRN    acetaminophen (TYLENOL) tablet 650 mg  650 mg Oral Q6H PRN    aspirin delayed-release tablet 81 mg  81 mg Oral DAILY    glucagon (GLUCAGEN) injection 1 mg  1 mg IntraMUSCular PRN    glucose chewable tablet 16 g  4 Tab Oral PRN    insulin lispro (HUMALOG) injection   SubCUTAneous AC&HS    magnesium oxide (MAG-OX) tablet 400 mg  400 mg Oral DAILY    nicotine (NICODERM CQ) 21 mg/24 hr patch 1 Patch  1 Patch TransDERmal Q24H    0.9% sodium chloride infusion  3 mL/hr IntraVENous CONTINUOUS    sodium chloride (NS) flush 5-10 mL  5-10 mL IntraVENous Q8H    sodium chloride (NS) flush 5-10 mL  5-10 mL IntraVENous PRN    albuterol (PROVENTIL VENTOLIN) nebulizer solution 2.5 mg  2.5 mg Nebulization Q4H PRN    heparin 25,000 units in D5W 250 ml infusion  12-25 Units/kg/hr IntraVENous TITRATE    morphine injection 2 mg  2 mg IntraVENous Q4H PRN    diphenhydrAMINE (BENADRYL) capsule 25 mg  25 mg Oral QHS PRN    oxyCODONE-acetaminophen (PERCOCET) 5-325 mg per tablet 2 Tab  2 Tab Oral Q4H PRN    milrinone (PRIMACOR) 20 MG/100 ML D5W infusion  0.3 mcg/kg/min IntraVENous CONTINUOUS    ELECTROLYTE REPLACEMENT PROTOCOL  1 Each Other PRN      No Known Allergies    Objective:  Vitals:    Vitals:    01/30/17 0300 01/30/17 0400 01/30/17 0423 01/30/17 0500   BP: 102/63 110/73  108/63   Pulse: 89 94 90 91   Resp: 20 21 20 20   Temp:  98.3 °F (36.8 °C)     SpO2: 93% 94% 91% 90%   Weight:       Height:         Intake and Output:  01/29 1901 - 01/30 0700  In: 070 [P.O.:290; I.V.:432]  Out: 850 [Urine:850]  01/28 0701 - 01/29 1900  In: 3256.5 [P.O.:1540; I.V.:1716.5]  Out: 2050 [Urine:2050]    Physical Examination:  General: Awake, alert  Resp:  Lungs mostly clear  CV:  RRR,  no murmur or rub,+ LE edema  GI:  Soft, NT, + Bowel sounds, no hepatosplenomegaly  Neurologic:  Non focal  Skin:  RLE cellulitis   :  No mendoza    []    High complexity decision making was performed  []    Patient is at high-risk of decompensation with multiple organ involvement    Lab Data Personally Reviewed: I have reviewed all the pertinent labs, microbiology data and radiology studies during assessment. Recent Labs      01/30/17 0416 01/29/17 0418 01/28/17 0338   NA  131*  132*  131*   K  3.9  4.0  4.0   CL  94*  94*  93*   CO2  29  29  30   GLU  82  96  124*   BUN  40*  37*  31*   CREA  1.94*  1.76*  1.67*   CA  8.5  8.8  8.4*   MG  2.0  2.1  1.9   ALB  2.5*  2.5*  2.4*   SGOT  14*  12*  11*   ALT  8*  <6*  7*     Recent Labs      01/30/17 0416 01/29/17 0418 01/28/17 0338   WBC  5.7  5.8  6.7   HGB  10.0*  10.3*  10.0*   HCT  31.8*  32.8*  31.7*   PLT  111*  122*  131*     No results found for: SDES  Lab Results   Component Value Date/Time    Culture result: NO GROWTH 3 DAYS 01/26/2017 03:05 PM    Culture result:  01/25/2017 09:34 PM     LIGHT  STREPTOCOCCI, BETA HEMOLYTIC GROUP C  . .. Penicillin and ampicillin are drugs of choice for treatment of beta-hemolytic streptococcal infections. Susceptibility testing of penicillins and beta-lactams approved by the FDA for treatment of beta-hemolytic streptococcal infections need not be performed routinely, because nonsusceptible isolates are extremely rare.  CLSI 2012      Culture result: MODERATE  CANDIDA TROPICALIS   01/25/2017 09:34 PM    Culture result: NO SIGNIFICANT GROWTH 01/19/2017 01:35 AM     Recent Results (from the past 24 hour(s)) GLUCOSE, POC    Collection Time: 01/29/17  8:14 AM   Result Value Ref Range    Glucose (POC) 105 (H) 65 - 100 mg/dL    Performed by [address], POC    Collection Time: 01/29/17 12:38 PM   Result Value Ref Range    Glucose (POC) 175 (H) 65 - 100 mg/dL    Performed by [facility], POC    Collection Time: 01/29/17  4:57 PM   Result Value Ref Range    Glucose (POC) 200 (H) 65 - 100 mg/dL    Performed by [facility], POC    Collection Time: 01/29/17 10:12 PM   Result Value Ref Range    Glucose (POC) 137 (H) 65 - 100 mg/dL    Performed by MARY JANE AMBROCIO    METABOLIC PANEL, COMPREHENSIVE    Collection Time: 01/30/17  4:16 AM   Result Value Ref Range    Sodium 131 (L) 136 - 145 mmol/L    Potassium 3.9 3.5 - 5.1 mmol/L    Chloride 94 (L) 97 - 108 mmol/L    CO2 29 21 - 32 mmol/L    Anion gap 8 5 - 15 mmol/L    Glucose 82 65 - 100 mg/dL    BUN 40 (H) 6 - 20 MG/DL    Creatinine 1.94 (H) 0.70 - 1.30 MG/DL    BUN/Creatinine ratio 21 (H) 12 - 20      GFR est AA 42 (L) >60 ml/min/1.73m2    GFR est non-AA 35 (L) >60 ml/min/1.73m2    Calcium 8.5 8.5 - 10.1 MG/DL    Bilirubin, total 0.5 0.2 - 1.0 MG/DL    ALT 8 (L) 12 - 78 U/L    AST 14 (L) 15 - 37 U/L    Alk.  phosphatase 85 45 - 117 U/L    Protein, total 6.4 6.4 - 8.2 g/dL    Albumin 2.5 (L) 3.5 - 5.0 g/dL    Globulin 3.9 2.0 - 4.0 g/dL    A-G Ratio 0.6 (L) 1.1 - 2.2     MAGNESIUM    Collection Time: 01/30/17  4:16 AM   Result Value Ref Range    Magnesium 2.0 1.6 - 2.4 mg/dL   CBC W/O DIFF    Collection Time: 01/30/17  4:16 AM   Result Value Ref Range    WBC 5.7 4.1 - 11.1 K/uL    RBC 4.16 4.10 - 5.70 M/uL    HGB 10.0 (L) 12.1 - 17.0 g/dL    HCT 31.8 (L) 36.6 - 50.3 %    MCV 76.4 (L) 80.0 - 99.0 FL    MCH 24.0 (L) 26.0 - 34.0 PG    MCHC 31.4 30.0 - 36.5 g/dL    RDW 16.3 (H) 11.5 - 14.5 %    PLATELET 946 (L) 287 - 400 K/uL   PTT    Collection Time: 01/30/17  4:16 AM   Result Value Ref Range    aPTT 72.9 (H) 22.1 - 32.5 sec    aPTT, therapeutic range     58.0 - 77.0 SECS           I have reviewed the flowsheets. Chart and Pertinent Notes have been reviewed. No change in PMH ,family and social history from Consult note.       Glenn Fabian MD

## 2017-01-30 NOTE — PROGRESS NOTES
Problem: Falls - Risk of  Goal: *Knowledge of fall prevention  Outcome: Progressing Towards Goal  Patient has been without falls this shift. Bed in lowest position. Call bell, bedside table within easy reach. Problem: Heart Failure: Day 5  Goal: Off Pathway (Use only if patient is Off Pathway)  Outcome: Progressing Towards Goal  Patient up to chair and around in room. Edema improving bilateral lower legs. Patient scheduled for cardiac MRI today. Patient needs reinforcement for heart failure meds such as Coreg. Patient on room air with pulse oxygen saturations adequate. Problem: Impaired Skin Integrity/Pressure Ulcer Treatment  Goal: *Improvement of existing pressure ulcer  Outcome: Progressing Towards Goal  Patient with improving lesions bilateral lower legs. Being followed by wound care management with daily wound care ordered.

## 2017-01-30 NOTE — PROGRESS NOTES
Attended IDR in CVICU where care of patient was discussed.     Antwan Simmons  [facility]  ([place] [id number] Patient Care Specialist)   Paging Service [phone]

## 2017-01-30 NOTE — PROGRESS NOTES
[facility]  Progress Note      Date: 2017     Admit Date: 2017    Mr. Abimael Quach is a 61year old male patient admitted who was admitted to [facility] on  and transferred to [facility] on  for further evaluation of severe left main and 3 vessel CAD and systolic heart failure (EF 5-10%).         Subjective:   Pt sitting up eating breakfast. Denies c/o. No SOB or CP. Ready to have test performed.  Weight down 1 lb     Objective:     Visit Vitals    BP 98/63    Pulse 94    Temp 97.7 °F (36.5 °C)    Resp 20    Ht 5' 9\" (1.753 m)    Wt 186 lb 8.2 oz (84.6 kg)  Comment: RN weighed    SpO2 96%    BMI 27.54 kg/m2       Temp (24hrs), Av °F (36.7 °C), Min:97.7 °F (36.5 °C), Max:98.3 °F (36.8 °C)       Oxygen Therapy:  Oxygen Therapy  O2 Sat (%): 96 % (17 1300)  Pulse via Oximetry: 94 beats per minute (17 1300)  O2 Device: Room air (17 0800)  O2 Flow Rate (L/min): 2 l/min (17 0800)      Admission Weight: Last Weight   Weight: 178 lb 2.1 oz (80.8 kg) Weight: 186 lb 8.2 oz (84.6 kg) (RN weighed)     Intake / Output / Drain:  Last 24 hrs.:     Intake/Output Summary (Last 24 hours) at 17 1404  Last data filed at 17 0800   Gross per 24 hour   Intake           1534.6 ml   Output             1225 ml   Net            309.6 ml     EXAM:  Gen:  WA, WN, NAD  HEENT:   EOMI  CVS:  S1/S2  Pul:  Slightly decreased b/l bases  Abd:  +BS, soft, NT  Ext:  1+ b/l LE edema, multiple dressings in place, +erythema  Neuro:  No obvious deficits    Recent Results (from the past 24 hour(s))   GLUCOSE, POC    Collection Time: 17  4:57 PM   Result Value Ref Range    Glucose (POC) 200 (H) 65 - 100 mg/dL    Performed by Greg POC    Collection Time: 17 10:12 PM   Result Value Ref Range    Glucose (POC) 137 (H) 65 - 100 mg/dL    Performed by FERN Suárez    Collection Time: 17  4:16 AM   Result Value Ref Range    Sodium 131 (L) 136 - 145 mmol/L    Potassium 3.9 3.5 - 5.1 mmol/L    Chloride 94 (L) 97 - 108 mmol/L    CO2 29 21 - 32 mmol/L    Anion gap 8 5 - 15 mmol/L    Glucose 82 65 - 100 mg/dL    BUN 40 (H) 6 - 20 MG/DL    Creatinine 1.94 (H) 0.70 - 1.30 MG/DL    BUN/Creatinine ratio 21 (H) 12 - 20      GFR est AA 42 (L) >60 ml/min/1.73m2    GFR est non-AA 35 (L) >60 ml/min/1.73m2    Calcium 8.5 8.5 - 10.1 MG/DL    Bilirubin, total 0.5 0.2 - 1.0 MG/DL    ALT 8 (L) 12 - 78 U/L    AST 14 (L) 15 - 37 U/L    Alk. phosphatase 85 45 - 117 U/L    Protein, total 6.4 6.4 - 8.2 g/dL    Albumin 2.5 (L) 3.5 - 5.0 g/dL    Globulin 3.9 2.0 - 4.0 g/dL    A-G Ratio 0.6 (L) 1.1 - 2.2     MAGNESIUM    Collection Time: 01/30/17  4:16 AM   Result Value Ref Range    Magnesium 2.0 1.6 - 2.4 mg/dL   CBC W/O DIFF    Collection Time: 01/30/17  4:16 AM   Result Value Ref Range    WBC 5.7 4.1 - 11.1 K/uL    RBC 4.16 4.10 - 5.70 M/uL    HGB 10.0 (L) 12.1 - 17.0 g/dL    HCT 31.8 (L) 36.6 - 50.3 %    MCV 76.4 (L) 80.0 - 99.0 FL    MCH 24.0 (L) 26.0 - 34.0 PG    MCHC 31.4 30.0 - 36.5 g/dL    RDW 16.3 (H) 11.5 - 14.5 %    PLATELET 696 (L) 314 - 400 K/uL   PRO-BNP    Collection Time: 01/30/17  4:16 AM   Result Value Ref Range    NT pro-BNP 6094 (H) 0 - 125 PG/ML   PTT    Collection Time: 01/30/17  4:16 AM   Result Value Ref Range    aPTT 72.9 (H) 22.1 - 32.5 sec    aPTT, therapeutic range     58.0 - 77.0 SECS   GLUCOSE, POC    Collection Time: 01/30/17  7:16 AM   Result Value Ref Range    Glucose (POC) 88 65 - 100 mg/dL    Performed by [address], POC    Collection Time: 01/30/17 12:20 PM   Result Value Ref Range    Glucose (POC) 107 (H) 65 - 100 mg/dL    Performed by Jose Alberto Damico            ASSESSMENT/PLAN:    ICM, Stage D, NYHA Class IV, EF 7%: will increase milrinone to .3 mcg/kg/min. Cont. Diuretics. Consult palliative care. Hyponatremia:  Due to HF vs IVVD - Tolvaptan for sodiums below 130. Cont.  To monitor    Anemia: Will heme check stools, trend H/H    Left pleural effusion: repeat PA&lateral CXR tomorrow. F/U  cytology, protein, and culture/gram stain.      Pulmonary hypertension: cont. milrinone.       CAD with LM disease:  Cardiac MRI moved to Tuesday at [facility] d/t scheduling issues. Cont. ASA, Coreg, and statin for now. Is on Heparin gtt. CHACORTA on CKD:  Cont. Diuretics, renal following - appreciate input. Trend labs      Atrial fibrillation: remains on Amiodarone 200 mg BID. To remain on Heparin for now.       DM Type II: DTC following - appreciate input, cont. accuchecks and SSIC.     Thrombocytopenia: Platelets dropping, CECILIA panel sent, will cont. ASA and Heparin for now      Tobacco abuse: Nicotine patch. Encourage pt. To abstain from smoking.      Lower extremity venous stasis disease vs. Cellulitis: cont. Ancef per ID, keep areas covered.      Ppx: on Protonix and Heparin gtt.       Dispo: Transfer to stepdown unit today           Pt seen with Dr. Leia Chanel and Dr. Renée Dunn By: Mell Zhao PAFRANKY    Saw patient, agree with above  Risk of morbidity and mortality - high  Medical decision making - high complexity

## 2017-01-30 NOTE — CONSULTS
Palliative Medicine Consult  [place]: [phone] ([phone])    Patient Name: Lety Landis. YOB: 1950    Date of Initial Consult: 1/31/17  Reason for Consult: Advanced heart failure  Requesting Provider: Opal Huffman  Primary Care Physician: None      SUMMARY:   Lety Landis. \"Spud\"  is a 61y.o. year old with a past history of heart failure dx 3 years ago at [address] [place] [address] who was lost to follow up due to lack of insurance who was admitted on 1/17/16  to [facility] with SOB and fatigue. Found to have severe ischemic cardiomyopathy with severe 3 vessel disease on cath and EF 5-10%, uncontrolled DM (A1c 13.5), LE cellulitis. Pt tx to [facility] on 1/20/17 for surgical evaluation. Medical management has yet to improve pt's EF, high risk of mortality with surgery. On Milrinone. Cardiac viability study 1/31 showing viable areas in LAD and RCA territories, LCx w/ limited viability. Deciding on PCI. Current medical issues leading to Palliative Medicine involvement include: care decisions given severe ICM and other comorbidities. PALLIATIVE DIAGNOSES:   1. Shortness of breath  2. Edema b/l LE w/ pain   3. Fatigue        PLAN:   1. Meet pt along w/ Bne BUENROSTROW- alert and engaging in conversation. He has a good understanding of situation, discusses the viability MRI results, the 50:50 chances of mortality w/ PCI at this point. 2. Life has been difficult the past 6 months. Pt did not seek medical care most of his life and the primary reason behind this was financial. Was resigned to dying at home because he did not have insurance and he never wanted to leave debt for his wife and 2 sons that he could not pay off. However finally went to the ED upon assistance from his wife and since has had time to think- he does want to live, is not ready to give up on himself any longer because realizes he wants to live for his family incl 15 grandchildren and some great grands.  Wants to be an example to his family- as to why they should not smoke and drink. 3. Having open discussions w/ his wife Ricardo Swann and is not surprised by our conversation surrounding AMD and code status. Has had friends who have been DNR. Wants to have quality of life. 4. Plan: Cont to follow for support, will touch base w/ pt tmrw after he has had a chance to speak to his wife to see if they want to complete advanced directives. 5. Initial consult note routed to primary continuity provider  6. Communicated plan of care with: Palliative IDT; Uma Wright care management; Sarai THOMAS       GOALS OF CARE / TREATMENT PREFERENCES:   [====Goals of Care====]  GOALS OF CARE:  Patient / health care proxy stated goals: recovery as possible      TREATMENT PREFERENCES:   Code Status: Full Code    Advance Care Planning:  Advance Care Planning 1/20/2017   Patient's Healthcare Decision Maker is: Legal Next of Onur 71   Primary Decision Maker Name Miesha Samuels   Primary Decision Maker Phone Number [phone]   Primary Decision Maker Relationship to Patient Spouse   Confirm Advance Directive None       Other:    The palliative care team has discussed with patient / health care proxy about goals of care / treatment preferences for patient.  [====Goals of Care====]         HISTORY:     History obtained from: Pt, chart, staff    CHIEF COMPLAINT: I'm trying to get stronger     HPI/SUBJECTIVE:    The patient is:   [x] Verbal and participatory  [] Non-participatory due to:     Pt in NAD sitting in bed, today worked w/ PT and walked a few loops around hallway.      Clinical Pain Assessment (nonverbal scale for severity on nonverbal patients):   [++++ Clinical Pain Assessment++++]  [++++Pain Severity++++]: Pain: 2  [++++Pain Character++++]: aching   [++++Pain Duration++++]: several weeks  [++++Pain Effect++++]: Decr function   [++++Pain Factors++++]: edema from HF  [++++Pain Frequency++++]: constant   [++++Pain Location++++]: b/l LE  [++++ Clinical Pain Assessment++++]     FUNCTIONAL ASSESSMENT:     Palliative Performance Scale (PPS):  PPS: 50       PSYCHOSOCIAL/SPIRITUAL SCREENING:     Advance Care Planning:  Advance Care Planning 1/20/2017   Patient's Healthcare Decision Maker is: Legal Next of Onur Ruiz   Primary Decision Maker Name Martha Cabrera   Primary Decision Maker Phone Number [phone]   Primary Decision Maker Relationship to Patient Spouse   Confirm Advance Directive None        Any spiritual / Episcopalian concerns:  [] Yes /  [x] No    Caregiver Burnout:  [] Yes /  [x] No /  [] No Caregiver Present      Anticipatory grief assessment:   [x] Normal  / [] Maladaptive       ESAS Anxiety: Anxiety: 0    ESAS Depression: Depression: 0        REVIEW OF SYSTEMS:     Positive and pertinent negative findings in ROS are noted above in HPI. The following systems were [x] reviewed / [] unable to be reviewed as noted in HPI  Other findings are noted below. Systems: constitutional, ears/nose/mouth/throat, respiratory, gastrointestinal, genitourinary, musculoskeletal, integumentary, neurologic, psychiatric, endocrine. Positive findings noted below. Modified ESAS Completed by: provider   Fatigue: 3 Drowsiness: 0   Depression: 0 Pain: 2   Anxiety: 0 Nausea: 0   Anorexia: 0 Dyspnea: 2     Constipation: No     Stool Occurrence(s): 1        PHYSICAL EXAM:     From RN flowsheet:  Wt Readings from Last 3 Encounters:   02/01/17 189 lb 6 oz (85.9 kg)   01/20/17 178 lb 9.2 oz (81 kg)     Blood pressure 104/67, pulse 85, temperature 98.4 °F (36.9 °C), resp. rate 18, height 5' 9\" (1.753 m), weight 189 lb 6 oz (85.9 kg), SpO2 94 %.     Pain Scale 1: Numeric (0 - 10)  Pain Intensity 1: 4  Pain Onset 1: chronic  Pain Location 1: Leg, Foot  Pain Orientation 1: Left, Right  Pain Description 1: Constant, Throbbing  Pain Intervention(s) 1: Other (comment), Encouraged PCA (Pt will wait for percocet @1310)    Constitutional: awake, alert, engaging   Eyes: pupils equal, anicteric  ENMT: no nasal discharge, moist mucous membranes  Respiratory: breathing not labored at rest  Musculoskeletal: no deformity  Skin: warm, dry  Ext: ++ edema b/l LE w/ some erythema   Neurologic: following commands, moving all extremities  Psychiatric: full affect, no hallucinations         HISTORY:     Active Problems:    Systolic heart failure ([facility] [address] (1/20/2017)      Past Medical History   Diagnosis Date    Cardiomyopathy ([facility] [address] 1/20/2017     A. Echo (1/19/17):  EF 5-10% with severe GHK,. Mildly dil LA. Mild TR. PASP 46. No past surgical history on file. No family history on file. History reviewed, no pertinent family history.   Social History   Substance Use Topics    Smoking status: Not on file    Smokeless tobacco: Not on file    Alcohol use Not on file     No Known Allergies   Current Facility-Administered Medications   Medication Dose Route Frequency    [START ON 2/2/2017] glimepiride (AMARYL) tablet 4 mg  4 mg Oral ACB    [START ON 2/2/2017] insulin glargine (LANTUS) injection 10 Units  10 Units SubCUTAneous DAILY    gabapentin (NEURONTIN) capsule 100 mg  100 mg Oral BID    bumetanide (BUMEX) tablet 2 mg  2 mg Oral TID    milrinone (PRIMACOR) 20 MG/100 ML D5W infusion  0.3 mcg/kg/min IntraVENous CONTINUOUS    carvedilol (COREG) tablet 3.125 mg  3.125 mg Oral BID WITH MEALS    amiodarone (CORDARONE) tablet 200 mg  200 mg Oral Q12H    bacitracin 500 unit/gram packet 1 Packet  1 Packet Topical PRN    atorvastatin (LIPITOR) tablet 10 mg  10 mg Oral DAILY    ceFAZolin (ANCEF) 1 g in 0.9% sodium chloride (MBP/ADV) 50 mL  1 g IntraVENous Q8H    0.9% sodium chloride infusion  10 mL/hr IntraVENous CONTINUOUS    acetaminophen (TYLENOL) tablet 650 mg  650 mg Oral Q6H PRN    aspirin delayed-release tablet 81 mg  81 mg Oral DAILY    glucagon (GLUCAGEN) injection 1 mg  1 mg IntraMUSCular PRN    glucose chewable tablet 16 g  4 Tab Oral PRN    insulin lispro (HUMALOG) injection   SubCUTAneous AC&HS    magnesium oxide (MAG-OX) tablet 400 mg  400 mg Oral DAILY    0.9% sodium chloride infusion  3 mL/hr IntraVENous CONTINUOUS    sodium chloride (NS) flush 5-10 mL  5-10 mL IntraVENous Q8H    sodium chloride (NS) flush 5-10 mL  5-10 mL IntraVENous PRN    albuterol (PROVENTIL VENTOLIN) nebulizer solution 2.5 mg  2.5 mg Nebulization Q4H PRN    diphenhydrAMINE (BENADRYL) capsule 25 mg  25 mg Oral QHS PRN    oxyCODONE-acetaminophen (PERCOCET) 5-325 mg per tablet 2 Tab  2 Tab Oral Q4H PRN    ELECTROLYTE REPLACEMENT PROTOCOL  1 Each Other PRN          LAB AND IMAGING FINDINGS:     Lab Results   Component Value Date/Time    WBC 5.8 02/01/2017 05:27 AM    HGB 10.5 02/01/2017 05:27 AM    PLATELET 99 27/80/5706 05:27 AM     Lab Results   Component Value Date/Time    Sodium 132 02/01/2017 05:27 AM    Potassium 4.1 02/01/2017 05:27 AM    Chloride 96 02/01/2017 05:27 AM    CO2 27 02/01/2017 05:27 AM    BUN 38 02/01/2017 05:27 AM    Creatinine 1.84 02/01/2017 05:27 AM    Calcium 9.1 02/01/2017 05:27 AM    Magnesium 2.1 02/01/2017 05:27 AM    Phosphorus 2.5 01/23/2017 12:09 PM      Lab Results   Component Value Date/Time    AST (SGOT) 13 02/01/2017 05:27 AM    Alk. phosphatase 89 02/01/2017 05:27 AM    Protein, total 6.5 02/01/2017 05:27 AM    Albumin 2.6 02/01/2017 05:27 AM    Globulin 3.9 02/01/2017 05:27 AM     Lab Results   Component Value Date/Time    INR 1.3 01/23/2017 12:09 PM    Prothrombin time 13.5 01/23/2017 12:09 PM    aPTT 30.3 02/01/2017 05:27 AM      No results found for: IRON, FE, TIBC, IBCT, PSAT, FERR   No results found for: PH, PCO2, PO2  No components found for: [place]   Lab Results   Component Value Date/Time    CK 51 01/18/2017 11:11 PM    CK - MB 2.9 01/18/2017 11:11 PM                Total time: 70 min   Counseling / coordination time: 50 min   > 50% counseling / coordination?: yes    Prolonged service was provided for  []30 min   []75 min in face to face time in the presence of the patient.   Time Start:   Time End:   Note: this can only be billed with 42686 (initial) or 90396 (follow up). If multiple start / stop times, list each separately.

## 2017-01-30 NOTE — CONSULTS
Palliative Medicine: Thank you for this consult- chart reviewed, pt w/ NYHA class IV CHF, EF 7% on milrinone currently. Mult comorbidities. Plan on seeing pt tmrw w/ social work. Note that pt does not have advanced medical directive on file.

## 2017-01-30 NOTE — PROGRESS NOTES
Bedside and Verbal shift change report given to ELVIRA London  (oncoming nurse) by Luiza Shaw RN (offgoing nurse). Report included the following information SBAR, OR Summary, Intake/Output, MAR, Recent Results and Cardiac Rhythm NSR.

## 2017-01-30 NOTE — PROGRESS NOTES
0800: Assumed care of patient from off going nurse   1000: Dr. Roberta Sanchez, Dr. Guanako Hayes, and Maria E JENKINS at bedside, updated on patient status. Order to increase milrinone drip to 0.3. Patient will remain on Heparin at this time. 1145: KUB at bedside, updated Dr. Roberta Sanchez that it was a single view KUB. No new orders at this time. 1300: Wound care nurse at bedside, dressings changed bilateral lower extremities. 1630: RN took patient to radiology for PA and Lateral Chest xray per AA. [facility].  2000: Bedside and Verbal shift change report given to Harmeet Madrid RN (oncoming nurse) by Saul Torres RN (offgoing nurse). Report included the following information SBAR, Intake/Output, MAR and Recent Results.

## 2017-01-30 NOTE — PROGRESS NOTES
2000 Received report and assumed care of patient. Drips verified. Patient comfortable watching TV and eating snack. 2215 Medicated with Trazodone 100 mg for sleep. 0000 Up to [facility]. Had BM and voided in toilet per patient. 0300 Noted while sleeping pulse oxygenation saturations on room air drop briefly to mid 80s but return to baseline 92-95 % before any treatment is necessary,  0630 Checked with MRI at [facility] concerning whether pt needed to be NPO for cardiac MRI later this AM. Spoke with Mani who stated that patient could eat and have breakfast. Dr. Morales Shirts in to see patient and informed him of patient's continued drop in platelet count While on heparin drip and pt's increase in abdominal distension. Orders received. 0800 Bedside and Verbal shift change report given to Nicole Wasserman (oncoming nurse) by Saad Hodges (offgoing nurse). Report included the following information SBAR, Kardex, Procedure Summary, Intake/Output, MAR and Recent Results. Cardiac monitor showing sinus rhythm with occasional PVCs noted. Caden White

## 2017-01-30 NOTE — PROGRESS NOTES
[facility] Progress Note      NAME:  Fernanda Graham. :   1950   MRN:   [id number]   PCP:  None  CARD:   Dr. Fernando Mclean    Date:  2017     Fernanda Graham. is a 61 y.o. male with a who presented for further evaluation of severe CAD and systolic heart failure. Subjective:   He was initially seen at [facility] 3 years ago and told that he had heart failure with LVEF 30%. He was lost to follow up due to lack of insurance and has not been seen by a physician in the interim. He complains of progressive fatigue, NAVARRO, PND, and edema over the past year, prompting presentation to [facility]. He also complains of lower extremity bullae, weeping, and pain. He denies palpitations, presyncope, syncope, or chest pain. Past 24 hours:  Denies orthopnea, PND  Less dyspnea  Denies chest pain  Ambulated in ICU today      Objective:     Visit Vitals    /69    Pulse 95    Temp 98.1 °F (36.7 °C)    Resp 24    Ht 5' 9\" (1.753 m)    Wt 84.6 kg (186 lb 8.2 oz)    SpO2 95%    BMI 27.54 kg/m2          General:  fatigued    HEENT: Normocephalic, EOMI, PERRLA, Hearing intact, trachea mid-line    Neck:  supple, no significant adenopathy, carotids upstroke normal bilaterally, no bruits    CVP:  10  cm  ( ++ ) HJR    Heart:  Diminished PMI    Normal S1 and S2, S3 gallop    Murmur: 2/6 systolic murmur    Lungs: Diminished breath sounds left lower lung    Abdomen: soft, non-tender. Bowel sounds normal. +HSM    Extremity: Warm, red on the right lower extremity with 2-3+ pitting edema bilaterally    Neuro: Alert and oriented to person, place, and time; normal strength and tone. Normal symmetric reflexes. Normal coordination and gait      1901 -  0700  In: 2723.4 [P.O.:1110;  I.V.:1613.4]  Out: 2475 [Urine:2475]  O2 Flow Rate (L/min): 2 l/min O2 Device: Room air  Temp (24hrs), Av.1 °F (36.7 °C), Min:97.9 °F (36.6 °C), Max:98.3 °F (36.8 °C)          Care Plan discussed with: Comments   Patient x    Family      RN x    Care Manager                    Consultant:          Past History:     Past Medical History   Diagnosis Date    Cardiomyopathy ([address] 1/20/2017     A. Echo (1/19/17):  EF 5-10% with severe GHK,. Mildly dil LA. Mild TR. PASP 46. No past surgical history on file. Social History   Substance Use Topics    Smoking status: Not on file    Smokeless tobacco: Not on file    Alcohol use Not on file        No family history on file. Allergies:   No Known Allergies       Data Review:     CXR:   CXR Results  (Last 48 hours)    None            Echocardiogram:   Echo Results  (Last 48 hours)    None          No results found for this visit on 01/20/17. ECG:  EKG: ST with occasional PVC, NSIVCD, LAE    LABS:  Recent Results (from the past 24 hour(s))   GLUCOSE, POC    Collection Time: 01/29/17 12:38 PM   Result Value Ref Range    Glucose (POC) 175 (H) 65 - 100 mg/dL    Performed by [facility], POC    Collection Time: 01/29/17  4:57 PM   Result Value Ref Range    Glucose (POC) 200 (H) 65 - 100 mg/dL    Performed by [facility], POC    Collection Time: 01/29/17 10:12 PM   Result Value Ref Range    Glucose (POC) 137 (H) 65 - 100 mg/dL    Performed by [facility]    METABOLIC PANEL, COMPREHENSIVE    Collection Time: 01/30/17  4:16 AM   Result Value Ref Range    Sodium 131 (L) 136 - 145 mmol/L    Potassium 3.9 3.5 - 5.1 mmol/L    Chloride 94 (L) 97 - 108 mmol/L    CO2 29 21 - 32 mmol/L    Anion gap 8 5 - 15 mmol/L    Glucose 82 65 - 100 mg/dL    BUN 40 (H) 6 - 20 MG/DL    Creatinine 1.94 (H) 0.70 - 1.30 MG/DL    BUN/Creatinine ratio 21 (H) 12 - 20      GFR est AA 42 (L) >60 ml/min/1.73m2    GFR est non-AA 35 (L) >60 ml/min/1.73m2    Calcium 8.5 8.5 - 10.1 MG/DL    Bilirubin, total 0.5 0.2 - 1.0 MG/DL    ALT 8 (L) 12 - 78 U/L    AST 14 (L) 15 - 37 U/L    Alk.  phosphatase 85 45 - 117 U/L    Protein, total 6.4 6.4 - 8.2 g/dL    Albumin 2.5 (L) 3.5 - 5.0 g/dL    Globulin 3.9 2.0 - 4.0 g/dL    A-G Ratio 0.6 (L) 1.1 - 2.2     MAGNESIUM    Collection Time: 01/30/17  4:16 AM   Result Value Ref Range    Magnesium 2.0 1.6 - 2.4 mg/dL   CBC W/O DIFF    Collection Time: 01/30/17  4:16 AM   Result Value Ref Range    WBC 5.7 4.1 - 11.1 K/uL    RBC 4.16 4.10 - 5.70 M/uL    HGB 10.0 (L) 12.1 - 17.0 g/dL    HCT 31.8 (L) 36.6 - 50.3 %    MCV 76.4 (L) 80.0 - 99.0 FL    MCH 24.0 (L) 26.0 - 34.0 PG    MCHC 31.4 30.0 - 36.5 g/dL    RDW 16.3 (H) 11.5 - 14.5 %    PLATELET 337 (L) 006 - 400 K/uL   PTT    Collection Time: 01/30/17  4:16 AM   Result Value Ref Range    aPTT 72.9 (H) 22.1 - 32.5 sec    aPTT, therapeutic range     58.0 - 77.0 SECS   GLUCOSE, POC    Collection Time: 01/30/17  7:16 AM   Result Value Ref Range    Glucose (POC) 88 65 - 100 mg/dL    Performed by [facility] [place]      All Micro Results     Procedure Component Value Units Date/Time    CULTURE, BODY FLUID Jaquelin Champagne STAIN [[id number]] Collected:  01/26/17 1505    Order Status:  Completed Specimen:  Thoracentesis Updated:  01/30/17 1057     Special Requests: NO SPECIAL REQUESTS        GRAM STAIN OCCASIONAL  WBCS SEEN         NO ORGANISMS SEEN        Culture result: NO GROWTH 4 DAYS       CULTURE, Berkley Sorto STAIN [[id number]] Collected:  01/25/17 2134    Order Status:  Completed Specimen:  Leg Updated:  01/27/17 1440     Special Requests: NO SPECIAL REQUESTS        GRAM STAIN RARE  WBCS SEEN         NO ORGANISMS SEEN        Culture result: LIGHT  STREPTOCOCCI, BETA HEMOLYTIC GROUP C  . .. Penicillin and ampicillin are drugs of choice for treatment of beta-hemolytic streptococcal infections. Susceptibility testing of penicillins and beta-lactams approved by the FDA for treatment of beta-hemolytic streptococcal infections need not be performed routinely, because nonsusceptible isolates are extremely rare.  CLSI 2012         MODERATE  BETTIE TROPICALIS               Medications reviewed:    Current Facility-Administered Medications   Medication Dose Route Frequency    bumetanide (BUMEX) tablet 2 mg  2 mg Oral BID    carvedilol (COREG) tablet 3.125 mg  3.125 mg Oral BID WITH MEALS    amiodarone (CORDARONE) tablet 200 mg  200 mg Oral Q12H    traZODone (DESYREL) tablet 100 mg  100 mg Oral QHS PRN    polyethylene glycol (MIRALAX) packet 17 g  17 g Oral DAILY    bacitracin 500 unit/gram packet 1 Packet  1 Packet Topical PRN    atorvastatin (LIPITOR) tablet 10 mg  10 mg Oral DAILY    insulin glargine (LANTUS) injection 16 Units  16 Units SubCUTAneous DAILY    glimepiride (AMARYL) tablet 2 mg  2 mg Oral ACB    pantoprazole (PROTONIX) tablet 40 mg  40 mg Oral ACB    ondansetron (ZOFRAN) injection 4 mg  4 mg IntraVENous Q6H PRN    senna-docusate (PERICOLACE) 8.6-50 mg per tablet 1 Tab  1 Tab Oral DAILY    ceFAZolin (ANCEF) 1 g in 0.9% sodium chloride (MBP/ADV) 50 mL  1 g IntraVENous Q8H    0.9% sodium chloride infusion  10 mL/hr IntraVENous CONTINUOUS    sodium chloride (NS) flush 5-10 mL  5-10 mL InterCATHeter Q8H    sodium chloride (NS) flush 5-10 mL  5-10 mL InterCATHeter PRN    acetaminophen (TYLENOL) tablet 650 mg  650 mg Oral Q6H PRN    aspirin delayed-release tablet 81 mg  81 mg Oral DAILY    glucagon (GLUCAGEN) injection 1 mg  1 mg IntraMUSCular PRN    glucose chewable tablet 16 g  4 Tab Oral PRN    insulin lispro (HUMALOG) injection   SubCUTAneous AC&HS    magnesium oxide (MAG-OX) tablet 400 mg  400 mg Oral DAILY    nicotine (NICODERM CQ) 21 mg/24 hr patch 1 Patch  1 Patch TransDERmal Q24H    0.9% sodium chloride infusion  3 mL/hr IntraVENous CONTINUOUS    sodium chloride (NS) flush 5-10 mL  5-10 mL IntraVENous Q8H    sodium chloride (NS) flush 5-10 mL  5-10 mL IntraVENous PRN    albuterol (PROVENTIL VENTOLIN) nebulizer solution 2.5 mg  2.5 mg Nebulization Q4H PRN    heparin 25,000 units in D5W 250 ml infusion  12-25 Units/kg/hr IntraVENous TITRATE    morphine injection 2 mg  2 mg IntraVENous Q4H PRN    diphenhydrAMINE (BENADRYL) capsule 25 mg  25 mg Oral QHS PRN    oxyCODONE-acetaminophen (PERCOCET) 5-325 mg per tablet 2 Tab  2 Tab Oral Q4H PRN    milrinone (PRIMACOR) 20 MG/100 ML D5W infusion  0.3 mcg/kg/min IntraVENous CONTINUOUS    ELECTROLYTE REPLACEMENT PROTOCOL  1 Each Other PRN           IMPRESSION:   1. Acute on chronic systolic heart failure (ICM) - Stage D, NYHA Class IV  2. Severe native coronary artery disease  3. Diabetes Mellitus, Hga1c 13.5  4. History of tobacco abuse  5. Cellulitis  6. CHACORTA on CKD3  7. Pulmonary HTN  8. Persistent atrial fibrillation  9. Hyponatremia  10. Left pleural effusion       PLAN:   1. Increase IV milrinone 0.3 mcg/kg/min, Follow I/O, Replete electrolytes, Hold ACE-I due to CHACORTA on CKD. Continue low dose coreg and bumex  2. Continue ASA, statin, low dose BB; too high risk for CABG d/t low LVEF and diabetes out of control  3. Awaiting cardiac MRI for viability  4. Consider protected left main PCI once medically optimized  5. Lantus and sliding scale insulin, accuchecks, diabetic education  6. IV cefazolin 1 gram q 8 hours  7. Smoking cessation counseling, nicotine patch  8. Cytology for pleural fluid is still pending           Thank you for letting me see him with you,    Dorie Acevedo MD, [place] [address] Director  Jennifer Roman [address] [place] [address], [address] [place], [place] [address]  Office: [phone]  Fax: [phone]  24 hour VAD/HF Pager: [phone]

## 2017-01-30 NOTE — PROGRESS NOTES
I have read and agree with Katrin Cortes RN documentation and care. I have reviewed the [facility] and all flowsheets associated with patient's care today.

## 2017-01-30 NOTE — PROGRESS NOTES
Problem: Mobility Impaired (Adult and Pediatric)  Goal: *Acute Goals and Plan of Care (Insert Text)  Physical Therapy Goals  Initiated 1/21/2017  1. Patient will move from supine to sit and sit to supine in bed with modified independence within 7 day(s). 2. Patient will transfer from bed to chair and chair to bed with modified independence using the least restrictive device within 7 day(s). 3. Patient will perform sit to stand with modified independence within 7 day(s). 4. Patient will ambulate with modified independence for 200 feet with the least restrictive device within 7 day(s). 5. Patient will ascend/descend 4 stairs with 0 handrail(s) with modified independence within 7 day(s). PHYSICAL THERAPY TREATMENT  Patient: Gabriella Hauser. (64 y.o. male)  Date: 1/30/2017  Diagnosis: CAD  Systolic heart failure (HCC) <principal problem not specified>       Precautions:        ASSESSMENT:  Pt is a 62 y/o man presenting with CAD however no c/o SOB or NAVARRO today. Pt recd sitting in chair agreeable to therapy today. Pt is making good progress with therapy and was Supervision today with mobility and gait training. Pt gait trained 200' and performed 2 steps twice CGA. Pt had some buckling first time ascending stairs with no rails stating it was weakness. Pt performed stairs again this time using B/L rails with no buckling or struggle noted. Pt has B/L rails going in the front of his house, but none in the back and uses the door for support going up. Pt c/o pain on the bottoms of his feet while ambulating and thinks it's d/t lack of support the socks give him. Pt left sitting in chair with all needs in reach. Pt will benefit from skilled therapy to increase leg strength to be more independent with stairs and increase his overall independence with ADLs.       Progression toward goals:  [X]    Improving appropriately and progressing toward goals  [ ]    Improving slowly and progressing toward goals  [ ]    Not making progress toward goals and plan of care will be adjusted       PLAN:  Patient continues to benefit from skilled intervention to address the above impairments. Continue treatment per established plan of care. Discharge Recommendations:  None  Further Equipment Recommendations for Discharge:  None       SUBJECTIVE:   Patient stated It's just weakness in my legs.       OBJECTIVE DATA SUMMARY:   Critical Behavior:  Neurologic State: Alert, Appropriate for age  Orientation Level: Oriented X4  Cognition: Appropriate decision making, Appropriate for age attention/concentration, Appropriate safety awareness, Follows commands  Safety/Judgement: Awareness of environment  Functional Mobility Training:  Bed Mobility:  Transfers:  Sit to Stand: Supervision  Stand to Sit: Supervision        Balance:  Sitting: Intact  Standing: Intact  Standing - Static: Good  Standing - Dynamic : Good  Ambulation/Gait Training:  Distance (ft): 200 Feet (ft)  Assistive Device: Gait belt (only used during stairs)  Ambulation - Level of Assistance: Supervision     Gait Description (WDL): Exceptions to WDL  Gait Abnormalities: Trunk sway increased        Base of Support: Narrowed     Speed/Veronica: Pace decreased (<100 feet/min) (limited by lines)        Stairs:  Number of Stairs Trained: 4 (2 stairs completed twice)  Stairs - Level of Assistance: Contact guard assistance              Rail Use: Both (tried none first with noted weakness ascending)     Pain:  Pain Scale 1: Numeric (0 - 10)  Pain Intensity 1: 4  Pain Location 1: Leg  Pain Orientation 1: Lower;Left;Right  Pain Description 1: Aching;Constant  Pain Intervention(s) 1: Relaxation technique  Activity Tolerance:   Good  Please refer to the flowsheet for vital signs taken during this treatment.   After treatment:   [X]    Patient left in no apparent distress sitting up in chair  [ ]    Patient left in no apparent distress in bed  [X]    Call bell left within reach  [X]    Nursing notified  [ ]    Caregiver present  [ ]    Bed alarm activated      COMMUNICATION/COLLABORATION:   The patients plan of care was discussed with: Registered Nurse     Gordon Abbasi PT,DPT

## 2017-01-31 ENCOUNTER — HOSPITAL ENCOUNTER (OUTPATIENT)
Dept: MRI IMAGING | Age: 67
Discharge: HOME | End: 2017-01-31
Attending: NURSE PRACTITIONER
Payer: COMMERCIAL

## 2017-01-31 LAB
ALBUMIN SERPL BCP-MCNC: 2.6 G/DL (ref 3.5–5)
ALBUMIN/GLOB SERPL: 0.6 {RATIO} (ref 1.1–2.2)
ALP SERPL-CCNC: 86 U/L (ref 45–117)
ALT SERPL-CCNC: 7 U/L (ref 12–78)
ANION GAP BLD CALC-SCNC: 8 MMOL/L (ref 5–15)
APTT PPP: 66.7 SEC (ref 22.1–32.5)
AST SERPL W P-5'-P-CCNC: 12 U/L (ref 15–37)
BILIRUB SERPL-MCNC: 0.4 MG/DL (ref 0.2–1)
BNP SERPL-MCNC: 6258 PG/ML (ref 0–125)
BUN SERPL-MCNC: 42 MG/DL (ref 6–20)
BUN/CREAT SERPL: 21 (ref 12–20)
CALCIUM SERPL-MCNC: 9.1 MG/DL (ref 8.5–10.1)
CHLORIDE SERPL-SCNC: 97 MMOL/L (ref 97–108)
CO2 SERPL-SCNC: 30 MMOL/L (ref 21–32)
CREAT SERPL-MCNC: 1.98 MG/DL (ref 0.7–1.3)
ERYTHROCYTE [DISTWIDTH] IN BLOOD BY AUTOMATED COUNT: 16.5 % (ref 11.5–14.5)
GLOBULIN SER CALC-MCNC: 4.1 G/DL (ref 2–4)
GLUCOSE BLD STRIP.AUTO-MCNC: 142 MG/DL (ref 65–100)
GLUCOSE BLD STRIP.AUTO-MCNC: 174 MG/DL (ref 65–100)
GLUCOSE BLD STRIP.AUTO-MCNC: 197 MG/DL (ref 65–100)
GLUCOSE SERPL-MCNC: 109 MG/DL (ref 65–100)
HCT VFR BLD AUTO: 31.9 % (ref 36.6–50.3)
HGB BLD-MCNC: 10 G/DL (ref 12.1–17)
MAGNESIUM SERPL-MCNC: 1.9 MG/DL (ref 1.6–2.4)
MCH RBC QN AUTO: 24 PG (ref 26–34)
MCHC RBC AUTO-ENTMCNC: 31.3 G/DL (ref 30–36.5)
MCV RBC AUTO: 76.5 FL (ref 80–99)
PF4 HEPARIN CMPLX AB SER-ACNC: 2.73 OD (ref 0–0.4)
PLATELET # BLD AUTO: 86 K/UL (ref 150–400)
POTASSIUM SERPL-SCNC: 4.5 MMOL/L (ref 3.5–5.1)
PROT SERPL-MCNC: 6.7 G/DL (ref 6.4–8.2)
RBC # BLD AUTO: 4.17 M/UL (ref 4.1–5.7)
SERVICE CMNT-IMP: ABNORMAL
SODIUM SERPL-SCNC: 135 MMOL/L (ref 136–145)
THERAPEUTIC RANGE,PTTT: ABNORMAL SECS (ref 58–77)
WBC # BLD AUTO: 5.4 K/UL (ref 4.1–11.1)

## 2017-01-31 PROCEDURE — 74011250636 HC RX REV CODE- 250/636: Performed by: THORACIC SURGERY (CARDIOTHORACIC VASCULAR SURGERY)

## 2017-01-31 PROCEDURE — 83880 ASSAY OF NATRIURETIC PEPTIDE: CPT | Performed by: NURSE PRACTITIONER

## 2017-01-31 PROCEDURE — A9579 GAD-BASE MR CONTRAST NOS,1ML: HCPCS | Performed by: NURSE PRACTITIONER

## 2017-01-31 PROCEDURE — 74011250637 HC RX REV CODE- 250/637: Performed by: NURSE PRACTITIONER

## 2017-01-31 PROCEDURE — 74011250637 HC RX REV CODE- 250/637: Performed by: INTERNAL MEDICINE

## 2017-01-31 PROCEDURE — 74011250636 HC RX REV CODE- 250/636: Performed by: INTERNAL MEDICINE

## 2017-01-31 PROCEDURE — 65660000000 HC RM CCU STEPDOWN

## 2017-01-31 PROCEDURE — 74011250636 HC RX REV CODE- 250/636

## 2017-01-31 PROCEDURE — 74011250636 HC RX REV CODE- 250/636: Performed by: NURSE PRACTITIONER

## 2017-01-31 PROCEDURE — 74011636320 HC RX REV CODE- 636/320: Performed by: NURSE PRACTITIONER

## 2017-01-31 PROCEDURE — 36415 COLL VENOUS BLD VENIPUNCTURE: CPT | Performed by: NURSE PRACTITIONER

## 2017-01-31 PROCEDURE — 85730 THROMBOPLASTIN TIME PARTIAL: CPT | Performed by: NURSE PRACTITIONER

## 2017-01-31 PROCEDURE — 74011636637 HC RX REV CODE- 636/637: Performed by: NURSE PRACTITIONER

## 2017-01-31 PROCEDURE — 75561 CARDIAC MRI FOR MORPH W/DYE: CPT

## 2017-01-31 PROCEDURE — 74011000258 HC RX REV CODE- 258: Performed by: INTERNAL MEDICINE

## 2017-01-31 PROCEDURE — 83735 ASSAY OF MAGNESIUM: CPT | Performed by: NURSE PRACTITIONER

## 2017-01-31 PROCEDURE — 76450000000

## 2017-01-31 PROCEDURE — 85027 COMPLETE CBC AUTOMATED: CPT | Performed by: NURSE PRACTITIONER

## 2017-01-31 PROCEDURE — 74011250637 HC RX REV CODE- 250/637: Performed by: THORACIC SURGERY (CARDIOTHORACIC VASCULAR SURGERY)

## 2017-01-31 PROCEDURE — 82962 GLUCOSE BLOOD TEST: CPT

## 2017-01-31 PROCEDURE — 80053 COMPREHEN METABOLIC PANEL: CPT | Performed by: NURSE PRACTITIONER

## 2017-01-31 RX ORDER — GABAPENTIN 100 MG/1
100 CAPSULE ORAL 2 TIMES DAILY
Status: DISCONTINUED | OUTPATIENT
Start: 2017-01-31 | End: 2017-02-22 | Stop reason: HOSPADM

## 2017-01-31 RX ORDER — MAGNESIUM SULFATE 1 G/100ML
1 INJECTION INTRAVENOUS ONCE
Status: COMPLETED | OUTPATIENT
Start: 2017-01-31 | End: 2017-01-31

## 2017-01-31 RX ORDER — MILRINONE LACTATE 0.2 MG/ML
0.38 INJECTION, SOLUTION INTRAVENOUS CONTINUOUS
Status: DISCONTINUED | OUTPATIENT
Start: 2017-01-31 | End: 2017-02-12

## 2017-01-31 RX ORDER — MAGNESIUM SULFATE 1 G/100ML
INJECTION INTRAVENOUS
Status: COMPLETED
Start: 2017-01-31 | End: 2017-01-31

## 2017-01-31 RX ORDER — BUMETANIDE 1 MG/1
2 TABLET ORAL 3 TIMES DAILY
Status: DISCONTINUED | OUTPATIENT
Start: 2017-01-31 | End: 2017-02-03

## 2017-01-31 RX ADMIN — ATORVASTATIN CALCIUM 10 MG: 10 TABLET, FILM COATED ORAL at 08:28

## 2017-01-31 RX ADMIN — HEPARIN SODIUM AND DEXTROSE 25 UNITS/KG/HR: 10000; 5 INJECTION INTRAVENOUS at 03:08

## 2017-01-31 RX ADMIN — POLYETHYLENE GLYCOL 3350 17 G: 17 POWDER, FOR SOLUTION ORAL at 08:29

## 2017-01-31 RX ADMIN — Medication 10 ML: at 22:24

## 2017-01-31 RX ADMIN — OXYCODONE HYDROCHLORIDE AND ACETAMINOPHEN 2 TABLET: 5; 325 TABLET ORAL at 04:49

## 2017-01-31 RX ADMIN — CARVEDILOL 3.12 MG: 3.12 TABLET, FILM COATED ORAL at 08:29

## 2017-01-31 RX ADMIN — PANTOPRAZOLE SODIUM 40 MG: 40 TABLET, DELAYED RELEASE ORAL at 07:34

## 2017-01-31 RX ADMIN — OXYCODONE HYDROCHLORIDE AND ACETAMINOPHEN 2 TABLET: 5; 325 TABLET ORAL at 19:53

## 2017-01-31 RX ADMIN — Medication 81 MG: at 08:28

## 2017-01-31 RX ADMIN — GABAPENTIN 100 MG: 100 CAPSULE ORAL at 10:04

## 2017-01-31 RX ADMIN — GADOPENTETATE DIMEGLUMINE 20 ML: 469.01 INJECTION INTRAVENOUS at 13:32

## 2017-01-31 RX ADMIN — SODIUM CHLORIDE 10 ML/HR: 900 INJECTION, SOLUTION INTRAVENOUS at 05:03

## 2017-01-31 RX ADMIN — Medication 10 ML: at 06:49

## 2017-01-31 RX ADMIN — AMIODARONE HYDROCHLORIDE 200 MG: 200 TABLET ORAL at 22:36

## 2017-01-31 RX ADMIN — SODIUM CHLORIDE 3 ML/HR: 900 INJECTION, SOLUTION INTRAVENOUS at 05:01

## 2017-01-31 RX ADMIN — BUMETANIDE 2 MG: 1 TABLET ORAL at 22:37

## 2017-01-31 RX ADMIN — MILRINONE LACTATE 0.3 MCG/KG/MIN: 200 INJECTION, SOLUTION INTRAVENOUS at 00:47

## 2017-01-31 RX ADMIN — Medication 400 MG: at 08:29

## 2017-01-31 RX ADMIN — GABAPENTIN 100 MG: 100 CAPSULE ORAL at 18:22

## 2017-01-31 RX ADMIN — INSULIN GLARGINE 10 UNITS: 100 INJECTION, SOLUTION SUBCUTANEOUS at 10:04

## 2017-01-31 RX ADMIN — MAGNESIUM SULFATE 1 G: 1 INJECTION INTRAVENOUS at 07:35

## 2017-01-31 RX ADMIN — DOCUSATE SODIUM AND SENNOSIDES 1 TABLET: 8.6; 5 TABLET, FILM COATED ORAL at 08:29

## 2017-01-31 RX ADMIN — CARVEDILOL 3.12 MG: 3.12 TABLET, FILM COATED ORAL at 16:54

## 2017-01-31 RX ADMIN — AMIODARONE HYDROCHLORIDE 200 MG: 200 TABLET ORAL at 08:29

## 2017-01-31 RX ADMIN — CEFAZOLIN SODIUM 1 G: 1 INJECTION, POWDER, FOR SOLUTION INTRAMUSCULAR; INTRAVENOUS at 06:48

## 2017-01-31 RX ADMIN — OXYCODONE HYDROCHLORIDE AND ACETAMINOPHEN 2 TABLET: 5; 325 TABLET ORAL at 10:04

## 2017-01-31 RX ADMIN — BUMETANIDE 2 MG: 1 TABLET ORAL at 08:28

## 2017-01-31 RX ADMIN — GLIMEPIRIDE 2 MG: 2 TABLET ORAL at 07:34

## 2017-01-31 RX ADMIN — CEFAZOLIN SODIUM 1 G: 1 INJECTION, POWDER, FOR SOLUTION INTRAMUSCULAR; INTRAVENOUS at 15:33

## 2017-01-31 RX ADMIN — BUMETANIDE 2 MG: 1 TABLET ORAL at 15:33

## 2017-01-31 RX ADMIN — CEFAZOLIN SODIUM 1 G: 1 INJECTION, POWDER, FOR SOLUTION INTRAMUSCULAR; INTRAVENOUS at 22:23

## 2017-01-31 RX ADMIN — OXYCODONE HYDROCHLORIDE AND ACETAMINOPHEN 2 TABLET: 5; 325 TABLET ORAL at 16:53

## 2017-01-31 RX ADMIN — DIPHENHYDRAMINE HYDROCHLORIDE 25 MG: 25 CAPSULE ORAL at 22:23

## 2017-01-31 NOTE — PROGRESS NOTES
Primary Nurse Syl Ortega and Ghulam Grossman, RN & Mercedes Snow RN performed a dual skin assessment on this patient No impairment noted  Trey score is 19

## 2017-01-31 NOTE — PROGRESS NOTES
TRANSFER - OUT REPORT:    Verbal report given to Karlos Mead RN(name) on [employer].  being transferred to [facility] [id number] [facility] (unit) for ordered procedure       Report consisted of patients Situation, Background, Assessment and   Recommendations(SBAR). Information from the following report(s) SBAR was reviewed with the receiving nurse. Lines:   PICC Triple Lumen 01/25/17 Left;Brachial (Active)   Central Line Being Utilized Yes 1/31/2017  8:00 AM   Criteria for Appropriate Use Limited/no vessel suitable for conventional peripheral access 1/31/2017  8:00 AM   Site Assessment Other (Comment) 1/31/2017  8:00 AM   Phlebitis Assessment 0 1/31/2017  8:00 AM   Infiltration Assessment 0 1/31/2017  8:00 AM   Date of Last Dressing Change 01/25/17 1/31/2017  8:00 AM   Dressing Status Clean, dry, & intact 1/31/2017  8:00 AM   External Catheter Length (cm) 0 centimeters 1/31/2017  8:00 AM   Dressing Type Disk with Chlorhexadine Gluconate (CHG); Transparent 1/31/2017  8:00 AM   Action Taken Open ports on tubing capped 1/31/2017  8:00 AM   Hub Color/Line Status White; Infusing 1/31/2017  8:00 AM   Positive Blood Return (Site #1) Yes 1/31/2017  8:00 AM   Hub Color/Line Status [place]; Infusing 1/31/2017  8:00 AM   Positive Blood Return (Site #2) Yes 1/31/2017  8:00 AM   Hub Color/Line Status Red 1/31/2017  8:00 AM   Positive Blood Return (Site #3) Yes 1/31/2017  8:00 AM   Alcohol Cap Used Yes 1/31/2017  8:00 AM       Peripheral IV 01/18/17 Right Forearm (Active)   Site Assessment Clean, dry, & intact 1/31/2017  8:00 AM   Phlebitis Assessment 0 1/31/2017  8:00 AM   Infiltration Assessment 0 1/31/2017  8:00 AM   Dressing Status Clean, dry, & intact 1/31/2017  8:00 AM   Dressing Type Transparent 1/31/2017  8:00 AM   Hub Color/Line Status Pink;Capped 1/31/2017  8:00 AM   Action Taken Open ports on tubing capped 1/31/2017  8:00 AM   Alcohol Cap Used Yes 1/31/2017  8:00 AM        Opportunity for questions and clarification was provided. Patient transported with:   Monitor  Registered Nurse

## 2017-01-31 NOTE — PROGRESS NOTES
[facility]  Progress Note      Date: 2017     Admit Date: 2017    Mr. Paola Pacheco is a 61year old male patient admitted who was admitted to [facility] on  and transferred to [facility] on  for further evaluation of severe left main and 3 vessel CAD and systolic heart failure (EF 5-10%).         Subjective:   Pt sitting up in bed. Had some pain in the bottom of his feet last night with standing. Denies pain now. Denies CP or SOB. Nurse reports he was apneic some during the night while asleep.  Weight up 2 lbs from yesterday but I/O negative a little bit     Objective:     Visit Vitals    BP 98/54 (BP 1 Location: Right arm, BP Patient Position: At rest)    Pulse 94    Temp 98.1 °F (36.7 °C)    Resp 19    Ht 5' 9\" (1.753 m)    Wt 188 lb 11.4 oz (85.6 kg)    SpO2 91%    BMI 27.87 kg/m2       Temp (24hrs), Av °F (36.7 °C), Min:97.7 °F (36.5 °C), Max:98.3 °F (36.8 °C)       Oxygen Therapy:  Oxygen Therapy  O2 Sat (%): 91 % (17 08)  Pulse via Oximetry: 94 beats per minute (17 0800)  O2 Device: Room air (17 08)  O2 Flow Rate (L/min): 2 l/min (17 08)      Admission Weight: Last Weight   Weight: 178 lb 2.1 oz (80.8 kg) Weight: 188 lb 11.4 oz (85.6 kg)     Intake / Output / Drain:  Last 24 hrs.:     Intake/Output Summary (Last 24 hours) at 17 1629  Last data filed at 17 0800   Gross per 24 hour   Intake          1427.12 ml   Output             1900 ml   Net          -472.88 ml     EXAM:  Gen:  WA, WN, NAD  HEENT:   EOMI  CVS:  S1/S2,  +S3  Pul:  Slightly decreased L base  Abd:  +BS, soft, NT  Ext:  1-2+ b/l LE edema, multiple dressings in place, +erythema  Neuro:  No obvious deficits    Recent Results (from the past 24 hour(s))   GLUCOSE, POC    Collection Time: 17 12:20 PM   Result Value Ref Range    Glucose (POC) 107 (H) 65 - 100 mg/dL    Performed by [facility] [employer],[address], STOOL    Collection Time: 17  5:20 PM   Result Value Ref Range    Occult blood, stool NEGATIVE  NEG     GLUCOSE, POC    Collection Time: 01/30/17  5:33 PM   Result Value Ref Range    Glucose (POC) 106 (H) 65 - 100 mg/dL    Performed by Monik Denney, POC    Collection Time: 01/30/17 10:05 PM   Result Value Ref Range    Glucose (POC) 127 (H) 65 - 100 mg/dL    Performed by Harinder Dorsey    METABOLIC PANEL, COMPREHENSIVE    Collection Time: 01/31/17  4:52 AM   Result Value Ref Range    Sodium 135 (L) 136 - 145 mmol/L    Potassium 4.5 3.5 - 5.1 mmol/L    Chloride 97 97 - 108 mmol/L    CO2 30 21 - 32 mmol/L    Anion gap 8 5 - 15 mmol/L    Glucose 109 (H) 65 - 100 mg/dL    BUN 42 (H) 6 - 20 MG/DL    Creatinine 1.98 (H) 0.70 - 1.30 MG/DL    BUN/Creatinine ratio 21 (H) 12 - 20      GFR est AA 41 (L) >60 ml/min/1.73m2    GFR est non-AA 34 (L) >60 ml/min/1.73m2    Calcium 9.1 8.5 - 10.1 MG/DL    Bilirubin, total 0.4 0.2 - 1.0 MG/DL    ALT 7 (L) 12 - 78 U/L    AST 12 (L) 15 - 37 U/L    Alk.  phosphatase 86 45 - 117 U/L    Protein, total 6.7 6.4 - 8.2 g/dL    Albumin 2.6 (L) 3.5 - 5.0 g/dL    Globulin 4.1 (H) 2.0 - 4.0 g/dL    A-G Ratio 0.6 (L) 1.1 - 2.2     MAGNESIUM    Collection Time: 01/31/17  4:52 AM   Result Value Ref Range    Magnesium 1.9 1.6 - 2.4 mg/dL   CBC W/O DIFF    Collection Time: 01/31/17  4:52 AM   Result Value Ref Range    WBC 5.4 4.1 - 11.1 K/uL    RBC 4.17 4.10 - 5.70 M/uL    HGB 10.0 (L) 12.1 - 17.0 g/dL    HCT 31.9 (L) 36.6 - 50.3 %    MCV 76.5 (L) 80.0 - 99.0 FL    MCH 24.0 (L) 26.0 - 34.0 PG    MCHC 31.3 30.0 - 36.5 g/dL    RDW 16.5 (H) 11.5 - 14.5 %    PLATELET 86 (L) 962 - 400 K/uL   PTT    Collection Time: 01/31/17  4:52 AM   Result Value Ref Range    aPTT 66.7 (H) 22.1 - 32.5 sec    aPTT, therapeutic range     58.0 - 77.0 SECS   PRO-BNP    Collection Time: 01/31/17  4:52 AM   Result Value Ref Range    NT pro-BNP 6258 (H) 0 - 125 PG/ML   GLUCOSE, POC    Collection Time: 01/31/17  8:55 AM   Result Value Ref Range    Glucose (POC) 142 (H) 65 - 100 mg/dL    Performed by Ramya Horan      CXR on 1/30/17 - Persistent left basilar airspace disease, compatible with pneumonia or  atelectasis. New small to moderate left pleural effusion. ASSESSMENT/PLAN:    ICM, Stage D, NYHA Class IV, EF 7%: cont. Milrinone and diuretics. Increased diuretics to Bumex 2 mg po TID  Trend labs. Palliative care following. Pro-BNP slightly increased. Hemodynamics stable    Hyponatremia:  Due to HF vs IVVD -  Na improving, monitor, will dose Tolvaptan for sodiums below 130. Cont. To monitor    Anemia:  Hemoccult was negative - stable H/H, cont. To monitor    Thrombocytopenia: Platelets dropping, CECILIA pending, will d/c Heparin and cont. ASA. Left pleural effusion: small re-accumulation. Monitor. Cont. Increase diuretics to TID      Pulmonary hypertension: cont. milrinone.       CAD with LM disease:  Cardiac MRI today. Pt has not had CP - will stop Heparin, Pt on milrinone, ASA, Coreg and statin, No ACEi d/t hypotension and CHACORTA. CHACORTA on CKD:  Increased diuretics, renal following - appreciate input. BUN/Cr - stable      Atrial fibrillation: remains on Amiodarone 200 mg BID. Stop Heparin gtt    B/l Feet pain - gabapentin started. Monitor      DM Type II: DTC following - appreciate input, cont. accuchecks and SSIC.       Tobacco abuse: Nicotine patch. Smoking cessation      Lower extremity venous stasis disease vs. Cellulitis: leg growing group C strep, Ancef per ID, keep areas covered. Ppx: on Protonix, stop Heparin for now.   Order b/l SCDs      Dispo: Transferred to stepdown yesterday - no bed availability           Pt seen with Dr. Stephanie Wills d/w Dr. Karina Rodriguez By: Renate Briones PA-C    Saw patient, agree with above  Risk of morbidity and mortality - high  Medical decision making - high complexity

## 2017-01-31 NOTE — PROGRESS NOTES
[facility] Progress Note      NAME:  Fernanda Kelley. :   1950   MRN:   [id number]   PCP:  None  CARD:   Dr. Asa Cannon    Date:  2017     Fernanda Kelley. is a 61 y.o. male with a who presented for further evaluation of severe CAD and systolic heart failure. Subjective:   He was initially seen at [facility] 3 years ago and told that he had heart failure with LVEF 30%. He was lost to follow up due to lack of insurance and has not been seen by a physician in the interim. He complains of progressive fatigue, NAVARRO, PND, and edema over the past year, prompting presentation to [facility]. He also complains of lower extremity bullae, weeping, and pain. He denies palpitations, presyncope, syncope, or chest pain. Past 24 hours:  Cardiac MRI with significant viability  No malignant cells in pleural fluid  Denies dyspnea  Complains of fatigue      Objective:     Visit Vitals    /69 (BP 1 Location: Right arm, BP Patient Position: Sitting)    Pulse 87    Temp 97.4 °F (36.3 °C)    Resp 20    Ht 5' 9\" (1.753 m)    Wt 85.6 kg (188 lb 11.4 oz)    SpO2 98%    BMI 27.87 kg/m2          General:  fatigued    HEENT: Normocephalic, EOMI, PERRLA, Hearing intact, trachea mid-line    Neck:  supple, no significant adenopathy, carotids upstroke normal bilaterally, no bruits    CVP:  10  cm  ( + ) HJR    Heart:  Diminished PMI    Normal S1 and S2, S3 gallop    Murmur: 2/6 systolic murmur    Lungs: Diminished breath sounds left lower lung    Abdomen: soft, non-tender. Bowel sounds normal. +HSM    Extremity: Warm, red on the right lower extremity with 2+ pitting edema bilaterally    Neuro: Alert and oriented to person, place, and time; normal strength and tone. Normal symmetric reflexes. Normal coordination and gait       1901 -  0700  In: 2553.7 [P.O.:890;  I.V.:1663.7]  Out: 2725 [Urine:2725]  O2 Flow Rate (L/min): 2 l/min O2 Device: Room air  Temp (24hrs), Av.8 °F (36.6 °C), Min:97.4 °F (36.3 °C), Max:98.1 °F (36.7 °C)          Care Plan discussed with:    Comments   Patient x    Family      RN x    Care Manager                    Consultant:          Past History:     Past Medical History   Diagnosis Date    Cardiomyopathy ([address] 1/20/2017     A. Echo (1/19/17):  EF 5-10% with severe GHK,. Mildly dil LA. Mild TR. PASP 46. No past surgical history on file. Social History   Substance Use Topics    Smoking status: Not on file    Smokeless tobacco: Not on file    Alcohol use Not on file        No family history on file. Allergies:   No Known Allergies       Data Review:     CXR:   CXR Results  (Last 48 hours)               01/30/17 1702  XR CHEST PA LAT Final result    Impression:  IMPRESSION:    Persistent left basilar airspace disease, compatible with pneumonia or   atelectasis. New small to moderate left pleural effusion. Narrative:  INDICATION: CHF       COMPARISON: January 28, 2017       FINDINGS: PA and lateral views of the chest demonstrate a stable   cardiomediastinal silhouette. Left arm PICC line is unchanged in position, with   its tip overlying the superior vena cava. Left basilar airspace disease is   unchanged. There is a new small to moderate left pleural effusion. The   visualized osseous structures are unremarkable. Echocardiogram:   Echo Results  (Last 48 hours)    None          No results found for this visit on 01/20/17.       ECG:  EKG: ST with occasional PVC, NSIVCD, LAE    LABS:  Recent Results (from the past 24 hour(s))   OCCULT BLOOD, STOOL    Collection Time: 01/30/17  5:20 PM   Result Value Ref Range    Occult blood, stool NEGATIVE  NEG     GLUCOSE, POC    Collection Time: 01/30/17  5:33 PM   Result Value Ref Range    Glucose (POC) 106 (H) 65 - 100 mg/dL    Performed by Monik DailyBurn Олег, POC    Collection Time: 01/30/17 10:05 PM   Result Value Ref Range    Glucose (POC) 127 (H) 65 - 100 mg/dL    Performed by JIM Mcpherson    METABOLIC PANEL, COMPREHENSIVE    Collection Time: 01/31/17  4:52 AM   Result Value Ref Range    Sodium 135 (L) 136 - 145 mmol/L    Potassium 4.5 3.5 - 5.1 mmol/L    Chloride 97 97 - 108 mmol/L    CO2 30 21 - 32 mmol/L    Anion gap 8 5 - 15 mmol/L    Glucose 109 (H) 65 - 100 mg/dL    BUN 42 (H) 6 - 20 MG/DL    Creatinine 1.98 (H) 0.70 - 1.30 MG/DL    BUN/Creatinine ratio 21 (H) 12 - 20      GFR est AA 41 (L) >60 ml/min/1.73m2    GFR est non-AA 34 (L) >60 ml/min/1.73m2    Calcium 9.1 8.5 - 10.1 MG/DL    Bilirubin, total 0.4 0.2 - 1.0 MG/DL    ALT 7 (L) 12 - 78 U/L    AST 12 (L) 15 - 37 U/L    Alk.  phosphatase 86 45 - 117 U/L    Protein, total 6.7 6.4 - 8.2 g/dL    Albumin 2.6 (L) 3.5 - 5.0 g/dL    Globulin 4.1 (H) 2.0 - 4.0 g/dL    A-G Ratio 0.6 (L) 1.1 - 2.2     MAGNESIUM    Collection Time: 01/31/17  4:52 AM   Result Value Ref Range    Magnesium 1.9 1.6 - 2.4 mg/dL   CBC W/O DIFF    Collection Time: 01/31/17  4:52 AM   Result Value Ref Range    WBC 5.4 4.1 - 11.1 K/uL    RBC 4.17 4.10 - 5.70 M/uL    HGB 10.0 (L) 12.1 - 17.0 g/dL    HCT 31.9 (L) 36.6 - 50.3 %    MCV 76.5 (L) 80.0 - 99.0 FL    MCH 24.0 (L) 26.0 - 34.0 PG    MCHC 31.3 30.0 - 36.5 g/dL    RDW 16.5 (H) 11.5 - 14.5 %    PLATELET 86 (L) 781 - 400 K/uL   PTT    Collection Time: 01/31/17  4:52 AM   Result Value Ref Range    aPTT 66.7 (H) 22.1 - 32.5 sec    aPTT, therapeutic range     58.0 - 77.0 SECS   PRO-BNP    Collection Time: 01/31/17  4:52 AM   Result Value Ref Range    NT pro-BNP 6258 (H) 0 - 125 PG/ML   GLUCOSE, POC    Collection Time: 01/31/17  8:55 AM   Result Value Ref Range    Glucose (POC) 142 (H) 65 - 100 mg/dL    Performed by Eder Godinez      All Micro Results     Procedure Component Value Units Date/Time    CULTURE, BODY FLUID W Zabrina Waters [[id number]] Collected:  01/26/17 1505    Order Status:  Completed Specimen:  Thoracentesis Updated:  01/30/17 1057     Special Requests: NO SPECIAL REQUESTS        GRAM STAIN OCCASIONAL  WBCS SEEN         NO ORGANISMS SEEN        Culture result: NO GROWTH 4 DAYS       CULTURE, Roberto Arredondo [[id number]] Collected:  01/25/17 7700    Order Status:  Completed Specimen:  Leg Updated:  01/27/17 1440     Special Requests: NO SPECIAL REQUESTS        GRAM STAIN RARE  WBCS SEEN         NO ORGANISMS SEEN        Culture result: LIGHT  STREPTOCOCCI, BETA HEMOLYTIC GROUP C  . .. Penicillin and ampicillin are drugs of choice for treatment of beta-hemolytic streptococcal infections. Susceptibility testing of penicillins and beta-lactams approved by the FDA for treatment of beta-hemolytic streptococcal infections need not be performed routinely, because nonsusceptible isolates are extremely rare. CLSI 2012         MODERATE  BETTIE TROPICALIS             Specimen Source   1: Pleural Fluid   CYTOLOGIC INTERPRETATION:   Scattered mesothelial cells with degenerative changes and mixed inflammatory cells   General Categorization   No cells diagnostic for malignancy   Specimen Adequacy   Satisfactory for evaluation        IMPRESSION  1. Markedly dilated left ventricle with 3-D end-diastolic volume index to body  surface area of 153 mL/sq m. Mild eccentric left ventricular hypertrophy with  3-D mass index to body surface area of 85 g/sq m. Severe left ventricular  systolic dysfunction. Severe global hypokinesis with regional variation. 3-D  LVEF 10%. 2. Moderately dilated right ventricle by 3-D volumetric assessment. RV end  diastolic volume indexed to body surface area of 138 mL/sq m. Moderate to severe  right ventricular systolic dysfunction. Global hypokinesis. 3-D RVEF 25%. 3. Apical a tethered mitral valve leaflets. Mild mitral regurgitation. 4. Moderately severe 3+ tricuspid regurgitation. 5. On LGE study, the anterior wall, anteroseptal wall, anteroapical wall, and  anterior lateral wall are largely viable without significant myocardial  infarction.  The entire inferior wall and inferoseptal wall are completely viable  without any infarct. The base to mid inferolateral wall demonstrate a near  transmural greater than 75% thickness infarct with minimal or no viable  myocardium in this territory. The distal inferolateral wall, apical lateral wall  does not demonstrate any infarct and are largely viable. Based on the viability  imaging, the entire LAD territory is largely viable and should recover upon  revascularization. The entire RCA territory is largely viable and should recover  upon revascularization. The LCx territory demonstrate medium-size infarct with  limited viability. 6. Large left-sided pleural effusion with passive atelectasis of the left lung. 7. Dilated branch pulmonary arteries suggest pulmonary hypertension. 8. Markedly dilated left atrium measuring 59 x 55 mm. Moderately dilated right  atrium measuring 46 x 56 mm.     Medications reviewed:    Current Facility-Administered Medications   Medication Dose Route Frequency    gabapentin (NEURONTIN) capsule 100 mg  100 mg Oral BID    bumetanide (BUMEX) tablet 2 mg  2 mg Oral TID    carvedilol (COREG) tablet 3.125 mg  3.125 mg Oral BID WITH MEALS    amiodarone (CORDARONE) tablet 200 mg  200 mg Oral Q12H    traZODone (DESYREL) tablet 100 mg  100 mg Oral QHS PRN    polyethylene glycol (MIRALAX) packet 17 g  17 g Oral DAILY    bacitracin 500 unit/gram packet 1 Packet  1 Packet Topical PRN    atorvastatin (LIPITOR) tablet 10 mg  10 mg Oral DAILY    insulin glargine (LANTUS) injection 16 Units  16 Units SubCUTAneous DAILY    glimepiride (AMARYL) tablet 2 mg  2 mg Oral ACB    pantoprazole (PROTONIX) tablet 40 mg  40 mg Oral ACB    ondansetron (ZOFRAN) injection 4 mg  4 mg IntraVENous Q6H PRN    senna-docusate (PERICOLACE) 8.6-50 mg per tablet 1 Tab  1 Tab Oral DAILY    ceFAZolin (ANCEF) 1 g in 0.9% sodium chloride (MBP/ADV) 50 mL  1 g IntraVENous Q8H    0.9% sodium chloride infusion  10 mL/hr IntraVENous CONTINUOUS    sodium chloride (NS) flush 5-10 mL  5-10 mL InterCATHeter Q8H    sodium chloride (NS) flush 5-10 mL  5-10 mL InterCATHeter PRN    acetaminophen (TYLENOL) tablet 650 mg  650 mg Oral Q6H PRN    aspirin delayed-release tablet 81 mg  81 mg Oral DAILY    glucagon (GLUCAGEN) injection 1 mg  1 mg IntraMUSCular PRN    glucose chewable tablet 16 g  4 Tab Oral PRN    insulin lispro (HUMALOG) injection   SubCUTAneous AC&HS    magnesium oxide (MAG-OX) tablet 400 mg  400 mg Oral DAILY    nicotine (NICODERM CQ) 21 mg/24 hr patch 1 Patch  1 Patch TransDERmal Q24H    0.9% sodium chloride infusion  3 mL/hr IntraVENous CONTINUOUS    sodium chloride (NS) flush 5-10 mL  5-10 mL IntraVENous Q8H    sodium chloride (NS) flush 5-10 mL  5-10 mL IntraVENous PRN    albuterol (PROVENTIL VENTOLIN) nebulizer solution 2.5 mg  2.5 mg Nebulization Q4H PRN    morphine injection 2 mg  2 mg IntraVENous Q4H PRN    diphenhydrAMINE (BENADRYL) capsule 25 mg  25 mg Oral QHS PRN    oxyCODONE-acetaminophen (PERCOCET) 5-325 mg per tablet 2 Tab  2 Tab Oral Q4H PRN    milrinone (PRIMACOR) 20 MG/100 ML D5W infusion  0.3 mcg/kg/min IntraVENous CONTINUOUS    ELECTROLYTE REPLACEMENT PROTOCOL  1 Each Other PRN           IMPRESSION:   1. Acute on chronic systolic heart failure (ICM) - Stage D, NYHA Class IV  2. Severe native coronary artery disease  3. Diabetes Mellitus, Hga1c 13.5  4. History of tobacco abuse  5. Cellulitis  6. CHACORTA on CKD3  7. Pulmonary HTN  8. Persistent atrial fibrillation  9. Hyponatremia  10. Left pleural effusion       PLAN:   1. Increase IV milrinone 0.3 mcg/kg/min, Follow I/O, Replete electrolytes, Hold ACE-I due to CHACORTA on CKD. Continue low dose coreg; increase IV bumex to 2 mg tid  2. Continue ASA, statin, low dose BB; too high risk for CABG d/t low LVEF and diabetes out of control  3. Viable LAD and RCA territory and limited LCx viability - will discuss high risk PCI with Mary López and Vishal Douglass  4.  Lantus and sliding scale insulin, accuchecks, diabetic education  5. IV cefazolin 1 gram q 8 hours  6. Smoking cessation counseling, nicotine patch         Thank you for letting me see him with you,    Dorie Wen MD, [place] [address] Director  Jennifer Roman 172  [address]  Office: [phone]  Fax: [phone]  24 hour VAD/HF Pager: [phone]

## 2017-01-31 NOTE — PROGRESS NOTES
Infectious Diseases Progress Note    Antibiotic Summary:  Levaquin  --   Vancomycin  --   Ancef    -- present      Subjective:     His legs feel better. He has noted SOB at times. Objective:     Vitals:   Visit Vitals    BP (P) 107/66 (BP 1 Location: Right arm, BP Patient Position: At rest)    Pulse 92    Temp 98.3 °F (36.8 °C)    Resp 18    Ht 5' 9\" (1.753 m)    Wt 84.6 kg (186 lb 8.2 oz)  Comment: RN weighed    SpO2 94%    BMI 27.54 kg/m2        Tmax:  Temp (24hrs), Av.1 °F (36.7 °C), Min:97.7 °F (36.5 °C), Max:98.3 °F (36.8 °C)      Exam:  General appearance: alert, no distress  Lungs: rales at bases  Heart: RRR; HR 90s  Abdomen: soft and nontender  Left leg: erythema improving; no ulcers  Right leg: erythema better; superficial ulcers on the right leg are drying up. IV Lines: Left PICC inserted     Labs:    Recent Labs      17   0416  17   0418  17   0338   WBC  5.7  5.8  6.7   HGB  10.0*  10.3*  10.0*   PLT  111*  122*  131*   BUN  40*  37*  31*   CREA  1.94*  1.76*  1.67*   TBILI  0.5  0.5  0.5   SGOT  14*  12*  11*   AP  85  81  79     Culture right leg ulcers  = group C Streptococcus    Assessment:     1. Bilateral venous stasis disease of the LEs +/- cellulitis: Improving     2. OHD -- IHD/CAD; CHF; pulm HTN     3. CRF with acute exacerbation     4. NIDDM -- new diagnosis     5. Probable COPD -- heavy smoking since age 7      10. Anemia -- HC/MC -- positive family history of colon cancer -- may need GI W/U    Plan:     1.  Shmuel Vaz MD

## 2017-01-31 NOTE — PROGRESS NOTES
[facility]   [address] [facility], [facility] [address], [facility]  Phone: [phone]   [phone]       Nephrology Progress Note  Magdaleno James.     1950     [id number]  Date of Admission : 1/20/2017 01/31/17    CC: Follow up for CKD/ARF      Assessment and Plan   CHACORTA on CKD :  - CHACORTA likely 2/2 cardiorenal syndrome + IVVD from diuretics   - Cr stable with diuresis . - Increased Bumex to 2 mg TID    Worsening Thrombocytopenia :  - exclude CECILIA  - possibly 2/2 Ancef   - may have hypersplenism from alcoholism    Peripheral Neuropathy : start low dose Gabapentin     Hyponatremia :  - combination of  CHF + SIADH from chronic alcoholism  - Na at 135 today   - Diuresis w/ loops . AVOID THIAZIDES   - Tolvaptan when Na < 130    Acute on Chronic Systolic CHF w/ severe CMP  - echo with EF 5-10%, severely dilated LV  - Multivessel CAD : being evaluated for CABG   - on Milrinone     Cellulitis RLE w/ Pustular lesions : On Ancef per ID    Pulm HTN     Chronic smoker w/VENANCIO -PNA    Discussed with Nursing     Interval History: For cardiac MRI   Reports Neuropathy pain in both feet   No cp, sob, n/v/d reported. Review of Systems: Pertinent items are noted in HPI.     Current Medications:   Current Facility-Administered Medications   Medication Dose Route Frequency    gabapentin (NEURONTIN) capsule 100 mg  100 mg Oral BID    bumetanide (BUMEX) tablet 2 mg  2 mg Oral TID    carvedilol (COREG) tablet 3.125 mg  3.125 mg Oral BID WITH MEALS    amiodarone (CORDARONE) tablet 200 mg  200 mg Oral Q12H    traZODone (DESYREL) tablet 100 mg  100 mg Oral QHS PRN    polyethylene glycol (MIRALAX) packet 17 g  17 g Oral DAILY    bacitracin 500 unit/gram packet 1 Packet  1 Packet Topical PRN    atorvastatin (LIPITOR) tablet 10 mg  10 mg Oral DAILY    insulin glargine (LANTUS) injection 16 Units  16 Units SubCUTAneous DAILY    glimepiride (AMARYL) tablet 2 mg  2 mg Oral ACB    pantoprazole (PROTONIX) tablet 40 mg  40 mg Oral ACB    ondansetron (ZOFRAN) injection 4 mg  4 mg IntraVENous Q6H PRN    senna-docusate (PERICOLACE) 8.6-50 mg per tablet 1 Tab  1 Tab Oral DAILY    ceFAZolin (ANCEF) 1 g in 0.9% sodium chloride (MBP/ADV) 50 mL  1 g IntraVENous Q8H    0.9% sodium chloride infusion  10 mL/hr IntraVENous CONTINUOUS    sodium chloride (NS) flush 5-10 mL  5-10 mL InterCATHeter Q8H    sodium chloride (NS) flush 5-10 mL  5-10 mL InterCATHeter PRN    acetaminophen (TYLENOL) tablet 650 mg  650 mg Oral Q6H PRN    aspirin delayed-release tablet 81 mg  81 mg Oral DAILY    glucagon (GLUCAGEN) injection 1 mg  1 mg IntraMUSCular PRN    glucose chewable tablet 16 g  4 Tab Oral PRN    insulin lispro (HUMALOG) injection   SubCUTAneous AC&HS    magnesium oxide (MAG-OX) tablet 400 mg  400 mg Oral DAILY    nicotine (NICODERM CQ) 21 mg/24 hr patch 1 Patch  1 Patch TransDERmal Q24H    0.9% sodium chloride infusion  3 mL/hr IntraVENous CONTINUOUS    sodium chloride (NS) flush 5-10 mL  5-10 mL IntraVENous Q8H    sodium chloride (NS) flush 5-10 mL  5-10 mL IntraVENous PRN    albuterol (PROVENTIL VENTOLIN) nebulizer solution 2.5 mg  2.5 mg Nebulization Q4H PRN    heparin 25,000 units in D5W 250 ml infusion  12-25 Units/kg/hr IntraVENous TITRATE    morphine injection 2 mg  2 mg IntraVENous Q4H PRN    diphenhydrAMINE (BENADRYL) capsule 25 mg  25 mg Oral QHS PRN    oxyCODONE-acetaminophen (PERCOCET) 5-325 mg per tablet 2 Tab  2 Tab Oral Q4H PRN    milrinone (PRIMACOR) 20 MG/100 ML D5W infusion  0.3 mcg/kg/min IntraVENous CONTINUOUS    ELECTROLYTE REPLACEMENT PROTOCOL  1 Each Other PRN      No Known Allergies    Objective:  Vitals:    Vitals:    01/31/17 0500 01/31/17 0600 01/31/17 0700 01/31/17 0800   BP: 111/68 99/52 97/55 98/54   Pulse: 92 83 84 94   Resp: 22 17 19 19   Temp:    98.1 °F (36.7 °C)   SpO2: 93% (!) 86% (!) 85% 91%   Weight:       Height:         Intake and Output:  01/31 0701 - 01/31 1900  In: - Out: 400 [Urine:400]  01/29 1901 - 01/31 0700  In: 2553.7 [P.O.:890; I.V.:1663.7]  Out: 0531 [Urine:2725]    Physical Examination:  General: Awake, alert  Resp:  Lungs mostly clear  CV:  RRR,  no murmur or rub,+ LE edema  GI:  Soft, NT, + Bowel sounds, no hepatosplenomegaly  Neurologic:  Non focal  Skin:  RLE cellulitis   :  No mendoza    []    High complexity decision making was performed  []    Patient is at high-risk of decompensation with multiple organ involvement    Lab Data Personally Reviewed: I have reviewed all the pertinent labs, microbiology data and radiology studies during assessment. Recent Labs      01/31/17 0452 01/30/17 0416 01/29/17 0418   NA  135*  131*  132*   K  4.5  3.9  4.0   CL  97  94*  94*   CO2  30  29  29   GLU  109*  82  96   BUN  42*  40*  37*   CREA  1.98*  1.94*  1.76*   CA  9.1  8.5  8.8   MG  1.9  2.0  2.1   ALB  2.6*  2.5*  2.5*   SGOT  12*  14*  12*   ALT  7*  8*  <6*     Recent Labs      01/31/17 0452 01/30/17 0416 01/29/17 0418   WBC  5.4  5.7  5.8   HGB  10.0*  10.0*  10.3*   HCT  31.9*  31.8*  32.8*   PLT  86*  111*  122*     No results found for: SDES  Lab Results   Component Value Date/Time    Culture result: NO GROWTH 4 DAYS 01/26/2017 03:05 PM    Culture result:  01/25/2017 09:34 PM     LIGHT  STREPTOCOCCI, BETA HEMOLYTIC GROUP C  . .. Penicillin and ampicillin are drugs of choice for treatment of beta-hemolytic streptococcal infections. Susceptibility testing of penicillins and beta-lactams approved by the FDA for treatment of beta-hemolytic streptococcal infections need not be performed routinely, because nonsusceptible isolates are extremely rare.  CLSI 2012      Culture result: MODERATE  CANDIDA TROPICALIS   01/25/2017 09:34 PM    Culture result: NO SIGNIFICANT GROWTH 01/19/2017 01:35 AM     Recent Results (from the past 24 hour(s))   GLUCOSE, POC    Collection Time: 01/30/17 12:20 PM   Result Value Ref Range    Glucose (POC) 107 (H) 65 - 100 mg/dL Performed by Litzy Dailey    OCCULT BLOOD, STOOL    Collection Time: 01/30/17  5:20 PM   Result Value Ref Range    Occult blood, stool NEGATIVE  NEG     GLUCOSE, POC    Collection Time: 01/30/17  5:33 PM   Result Value Ref Range    Glucose (POC) 106 (H) 65 - 100 mg/dL    Performed by Litzy Dailey    GLUCOSE, POC    Collection Time: 01/30/17 10:05 PM   Result Value Ref Range    Glucose (POC) 127 (H) 65 - 100 mg/dL    Performed by Cassidy Tao    METABOLIC PANEL, COMPREHENSIVE    Collection Time: 01/31/17  4:52 AM   Result Value Ref Range    Sodium 135 (L) 136 - 145 mmol/L    Potassium 4.5 3.5 - 5.1 mmol/L    Chloride 97 97 - 108 mmol/L    CO2 30 21 - 32 mmol/L    Anion gap 8 5 - 15 mmol/L    Glucose 109 (H) 65 - 100 mg/dL    BUN 42 (H) 6 - 20 MG/DL    Creatinine 1.98 (H) 0.70 - 1.30 MG/DL    BUN/Creatinine ratio 21 (H) 12 - 20      GFR est AA 41 (L) >60 ml/min/1.73m2    GFR est non-AA 34 (L) >60 ml/min/1.73m2    Calcium 9.1 8.5 - 10.1 MG/DL    Bilirubin, total 0.4 0.2 - 1.0 MG/DL    ALT 7 (L) 12 - 78 U/L    AST 12 (L) 15 - 37 U/L    Alk.  phosphatase 86 45 - 117 U/L    Protein, total 6.7 6.4 - 8.2 g/dL    Albumin 2.6 (L) 3.5 - 5.0 g/dL    Globulin 4.1 (H) 2.0 - 4.0 g/dL    A-G Ratio 0.6 (L) 1.1 - 2.2     MAGNESIUM    Collection Time: 01/31/17  4:52 AM   Result Value Ref Range    Magnesium 1.9 1.6 - 2.4 mg/dL   CBC W/O DIFF    Collection Time: 01/31/17  4:52 AM   Result Value Ref Range    WBC 5.4 4.1 - 11.1 K/uL    RBC 4.17 4.10 - 5.70 M/uL    HGB 10.0 (L) 12.1 - 17.0 g/dL    HCT 31.9 (L) 36.6 - 50.3 %    MCV 76.5 (L) 80.0 - 99.0 FL    MCH 24.0 (L) 26.0 - 34.0 PG    MCHC 31.3 30.0 - 36.5 g/dL    RDW 16.5 (H) 11.5 - 14.5 %    PLATELET 86 (L) 122 - 400 K/uL   PTT    Collection Time: 01/31/17  4:52 AM   Result Value Ref Range    aPTT 66.7 (H) 22.1 - 32.5 sec    aPTT, therapeutic range     58.0 - 77.0 SECS   PRO-BNP    Collection Time: 01/31/17  4:52 AM   Result Value Ref Range    NT pro-BNP 6258 (H) 0 - 125 PG/ML GLUCOSE, POC    Collection Time: 01/31/17  8:55 AM   Result Value Ref Range    Glucose (POC) 142 (H) 65 - 100 mg/dL    Performed by Home Monge I have reviewed the flowsheets. Chart and Pertinent Notes have been reviewed. No change in PMH ,family and social history from Consult note.       Quin Camarena MD

## 2017-01-31 NOTE — PROGRESS NOTES
Brief visit with Mr. Parviz Hernadez ([address]) in CVICU 0057. He appeared to be upbeat today. Looking forward to transferring out to a step down unit. Shared that his wife was feeling better about the situation. Realizes that there is a shortage of bed space and that staff are doing all they can to help. Spoke again of how is juan continues to sustain and support him. Desires continued  support. [facility]'s Staff  ([facility] 5 Patient Care Specialist)   Paging Service [phone])

## 2017-01-31 NOTE — PROGRESS NOTES
Cardiac Surgery Care Coordinator- Met with Mr Abimael Quach, reviewed plan of care, he has an excellent understanding of the plan for the day. He will be traveling to [address] [place] [address] to have his cardiac MRI. Will continue to follow for educational and emotional support.  Kwesi Bahena RN

## 2017-01-31 NOTE — PROGRESS NOTES
Physical Therapy    Pt currently transferred to [facility] for procedure. Will follow up with patient tomorrow.     Thank Rene Elias PT,DPT

## 2017-01-31 NOTE — PROGRESS NOTES
Met with the patient this morning. He has no major questions or concerns for  at this time. Requested palliative care consult to follow up with the patient on his plan of care/ wishes for future medical care (currently no advance directive on file). Per Stanley Vazquez of [facility] he will be screened for Medicaid as the disability team has accepted his case. Informed him of the f/u for Medicaid screening. Will continue to follow and assist with plan of care. Referral made to [facility] should the patient need inotrope upon d/c.     Jess Neff, MSW, LCSW    Clinical    Jennifer Roman [phone]

## 2017-01-31 NOTE — PALLIATIVE CARE
PALLIATIVE MEDICINE          Consult noted and appreciated. Arrived to see the pt but pt transferred to [facility] for a procedure. Will follow up tomorrow. [facility] Duty.  [address] MSN, FNP-BC, [facility]

## 2017-01-31 NOTE — PROGRESS NOTES
2000 report received from Charbel Aguirre 82  00 report given to Akhil THOMAS    Problem: Falls - Risk of  Goal: *Absence of falls  Outcome: Progressing Towards Goal  Bed in low and locked position; call bell and personal belongings within reach, side rails up x3, nonskid footwear, fall precaution bracelet and door identifier in place  Goal: *Knowledge of fall prevention  Outcome: Progressing Towards Goal  Patient educated on use of call bell and instructed to call for assistance; patient verbalized understanding of instructions     Problem: Pressure Ulcer - Risk of  Goal: *Prevention of pressure ulcer  Outcome: Progressing Towards Goal  Pressure ulcer risk assessment tool in use; Patient turned as needed, pillows and pressure reducing mattress in use; blood glucose monitored ac/hs; skin monitored for breakdown

## 2017-02-01 LAB
ALBUMIN SERPL BCP-MCNC: 2.6 G/DL (ref 3.5–5)
ALBUMIN/GLOB SERPL: 0.7 {RATIO} (ref 1.1–2.2)
ALP SERPL-CCNC: 89 U/L (ref 45–117)
ALT SERPL-CCNC: <6 U/L (ref 12–78)
ANION GAP BLD CALC-SCNC: 9 MMOL/L (ref 5–15)
APTT PPP: 30.3 SEC (ref 22.1–32.5)
AST SERPL W P-5'-P-CCNC: 13 U/L (ref 15–37)
BILIRUB SERPL-MCNC: 0.4 MG/DL (ref 0.2–1)
BUN SERPL-MCNC: 38 MG/DL (ref 6–20)
BUN/CREAT SERPL: 21 (ref 12–20)
CALCIUM SERPL-MCNC: 9.1 MG/DL (ref 8.5–10.1)
CHLORIDE SERPL-SCNC: 96 MMOL/L (ref 97–108)
CO2 SERPL-SCNC: 27 MMOL/L (ref 21–32)
CREAT SERPL-MCNC: 1.84 MG/DL (ref 0.7–1.3)
ERYTHROCYTE [DISTWIDTH] IN BLOOD BY AUTOMATED COUNT: 16.5 % (ref 11.5–14.5)
GLOBULIN SER CALC-MCNC: 3.9 G/DL (ref 2–4)
GLUCOSE BLD STRIP.AUTO-MCNC: 121 MG/DL (ref 65–100)
GLUCOSE BLD STRIP.AUTO-MCNC: 165 MG/DL (ref 65–100)
GLUCOSE BLD STRIP.AUTO-MCNC: 90 MG/DL (ref 65–100)
GLUCOSE BLD STRIP.AUTO-MCNC: 93 MG/DL (ref 65–100)
GLUCOSE SERPL-MCNC: 99 MG/DL (ref 65–100)
HCT VFR BLD AUTO: 33.5 % (ref 36.6–50.3)
HGB BLD-MCNC: 10.5 G/DL (ref 12.1–17)
MAGNESIUM SERPL-MCNC: 2.1 MG/DL (ref 1.6–2.4)
MCH RBC QN AUTO: 24.1 PG (ref 26–34)
MCHC RBC AUTO-ENTMCNC: 31.3 G/DL (ref 30–36.5)
MCV RBC AUTO: 77 FL (ref 80–99)
PLATELET # BLD AUTO: 99 K/UL (ref 150–400)
POTASSIUM SERPL-SCNC: 4.1 MMOL/L (ref 3.5–5.1)
PROT SERPL-MCNC: 6.5 G/DL (ref 6.4–8.2)
RBC # BLD AUTO: 4.35 M/UL (ref 4.1–5.7)
SERVICE CMNT-IMP: ABNORMAL
SERVICE CMNT-IMP: ABNORMAL
SERVICE CMNT-IMP: NORMAL
SERVICE CMNT-IMP: NORMAL
SODIUM SERPL-SCNC: 132 MMOL/L (ref 136–145)
THERAPEUTIC RANGE,PTTT: NORMAL SECS (ref 58–77)
WBC # BLD AUTO: 5.8 K/UL (ref 4.1–11.1)

## 2017-02-01 PROCEDURE — 36415 COLL VENOUS BLD VENIPUNCTURE: CPT | Performed by: PHYSICIAN ASSISTANT

## 2017-02-01 PROCEDURE — 74011250636 HC RX REV CODE- 250/636: Performed by: THORACIC SURGERY (CARDIOTHORACIC VASCULAR SURGERY)

## 2017-02-01 PROCEDURE — 85027 COMPLETE CBC AUTOMATED: CPT | Performed by: PHYSICIAN ASSISTANT

## 2017-02-01 PROCEDURE — 80053 COMPREHEN METABOLIC PANEL: CPT | Performed by: PHYSICIAN ASSISTANT

## 2017-02-01 PROCEDURE — 74011636637 HC RX REV CODE- 636/637: Performed by: NURSE PRACTITIONER

## 2017-02-01 PROCEDURE — 74011250637 HC RX REV CODE- 250/637: Performed by: NURSE PRACTITIONER

## 2017-02-01 PROCEDURE — 82962 GLUCOSE BLOOD TEST: CPT

## 2017-02-01 PROCEDURE — 83735 ASSAY OF MAGNESIUM: CPT | Performed by: PHYSICIAN ASSISTANT

## 2017-02-01 PROCEDURE — 82542 COL CHROMOTOGRAPHY QUAL/QUAN: CPT | Performed by: INTERNAL MEDICINE

## 2017-02-01 PROCEDURE — 74011250637 HC RX REV CODE- 250/637: Performed by: INTERNAL MEDICINE

## 2017-02-01 PROCEDURE — 94010 BREATHING CAPACITY TEST: CPT

## 2017-02-01 PROCEDURE — 74011250636 HC RX REV CODE- 250/636: Performed by: INTERNAL MEDICINE

## 2017-02-01 PROCEDURE — 85730 THROMBOPLASTIN TIME PARTIAL: CPT | Performed by: PHYSICIAN ASSISTANT

## 2017-02-01 PROCEDURE — 97116 GAIT TRAINING THERAPY: CPT

## 2017-02-01 PROCEDURE — 65660000000 HC RM CCU STEPDOWN

## 2017-02-01 PROCEDURE — 74011000258 HC RX REV CODE- 258: Performed by: INTERNAL MEDICINE

## 2017-02-01 RX ORDER — GLIMEPIRIDE 2 MG/1
4 TABLET ORAL
Status: DISCONTINUED | OUTPATIENT
Start: 2017-02-02 | End: 2017-02-15

## 2017-02-01 RX ORDER — INSULIN GLARGINE 100 [IU]/ML
10 INJECTION, SOLUTION SUBCUTANEOUS DAILY
Status: DISCONTINUED | OUTPATIENT
Start: 2017-02-02 | End: 2017-02-15

## 2017-02-01 RX ADMIN — GABAPENTIN 100 MG: 100 CAPSULE ORAL at 09:05

## 2017-02-01 RX ADMIN — SODIUM CHLORIDE 10 ML/HR: 900 INJECTION, SOLUTION INTRAVENOUS at 13:23

## 2017-02-01 RX ADMIN — MILRINONE LACTATE 0.3 MCG/KG/MIN: 200 INJECTION, SOLUTION INTRAVENOUS at 00:31

## 2017-02-01 RX ADMIN — CEFAZOLIN SODIUM 1 G: 1 INJECTION, POWDER, FOR SOLUTION INTRAMUSCULAR; INTRAVENOUS at 14:40

## 2017-02-01 RX ADMIN — Medication 400 MG: at 09:05

## 2017-02-01 RX ADMIN — OXYCODONE HYDROCHLORIDE AND ACETAMINOPHEN 2 TABLET: 5; 325 TABLET ORAL at 13:21

## 2017-02-01 RX ADMIN — MILRINONE LACTATE 0.3 MCG/KG/MIN: 200 INJECTION, SOLUTION INTRAVENOUS at 14:40

## 2017-02-01 RX ADMIN — GLIMEPIRIDE 2 MG: 2 TABLET ORAL at 07:13

## 2017-02-01 RX ADMIN — OXYCODONE HYDROCHLORIDE AND ACETAMINOPHEN 2 TABLET: 5; 325 TABLET ORAL at 09:09

## 2017-02-01 RX ADMIN — Medication 10 ML: at 13:20

## 2017-02-01 RX ADMIN — Medication 10 ML: at 22:21

## 2017-02-01 RX ADMIN — Medication 10 ML: at 12:14

## 2017-02-01 RX ADMIN — ATORVASTATIN CALCIUM 10 MG: 10 TABLET, FILM COATED ORAL at 09:05

## 2017-02-01 RX ADMIN — INSULIN GLARGINE 16 UNITS: 100 INJECTION, SOLUTION SUBCUTANEOUS at 09:05

## 2017-02-01 RX ADMIN — OXYCODONE HYDROCHLORIDE AND ACETAMINOPHEN 2 TABLET: 5; 325 TABLET ORAL at 00:32

## 2017-02-01 RX ADMIN — Medication 10 ML: at 05:30

## 2017-02-01 RX ADMIN — BUMETANIDE 2 MG: 1 TABLET ORAL at 18:00

## 2017-02-01 RX ADMIN — CARVEDILOL 3.12 MG: 3.12 TABLET, FILM COATED ORAL at 18:00

## 2017-02-01 RX ADMIN — AMIODARONE HYDROCHLORIDE 200 MG: 200 TABLET ORAL at 22:16

## 2017-02-01 RX ADMIN — AMIODARONE HYDROCHLORIDE 200 MG: 200 TABLET ORAL at 09:05

## 2017-02-01 RX ADMIN — OXYCODONE HYDROCHLORIDE AND ACETAMINOPHEN 2 TABLET: 5; 325 TABLET ORAL at 22:17

## 2017-02-01 RX ADMIN — GABAPENTIN 100 MG: 100 CAPSULE ORAL at 18:00

## 2017-02-01 RX ADMIN — CEFAZOLIN SODIUM 1 G: 1 INJECTION, POWDER, FOR SOLUTION INTRAMUSCULAR; INTRAVENOUS at 22:16

## 2017-02-01 RX ADMIN — CARVEDILOL 3.12 MG: 3.12 TABLET, FILM COATED ORAL at 09:05

## 2017-02-01 RX ADMIN — BUMETANIDE 2 MG: 1 TABLET ORAL at 22:16

## 2017-02-01 RX ADMIN — Medication 81 MG: at 09:04

## 2017-02-01 RX ADMIN — CEFAZOLIN SODIUM 1 G: 1 INJECTION, POWDER, FOR SOLUTION INTRAMUSCULAR; INTRAVENOUS at 07:14

## 2017-02-01 RX ADMIN — BUMETANIDE 2 MG: 1 TABLET ORAL at 09:04

## 2017-02-01 NOTE — PROGRESS NOTES
0730 Bedside shift change report given to Albert Granados RN (oncoming nurse) by Nereida Roldan RN (offgoing nurse). Report included the following information SBAR, Kardex, Procedure Summary, Intake/Output, MAR, Recent Results and Med Rec Status. 0950 Pt KIKE for PFTs. Returned @6889.    [phone] [id number] Bedside shift change report given to Nereida Roldan RN (oncoming nurse) by Albert Granados RN (offgoing nurse). Report included the following information SBAR, Kardex, ED Summary, Procedure Summary, Intake/Output, MAR, Recent Results and Med Rec Status. Problem: Diabetes Self-Management  Goal: *Using medications safely  State effect of diabetes medications on diabetes; name diabetes medication taking, action and side effects. Outcome: Progressing Towards Goal  Pt safely uses medications to manage his DM. Goal: *Monitoring blood glucose, interpreting and using results  Identify recommended blood glucose targets and personal targets. Outcome: Progressing Towards Goal  Pt is monitoring his blood glucose levels to determine need for SS insulin while in the inpatient setting. Problem: Falls - Risk of  Goal: *Absence of falls  Outcome: Progressing Towards Goal  Pt has remained free of falls since admission. He is up with assistance. Goal: *Knowledge of fall prevention  Outcome: Progressing Towards Goal  Pt demonstrates appropriate safety awareness. He calls for assistance when needed. Problem: Pressure Ulcer - Risk of  Goal: *Prevention of pressure ulcer  Outcome: Progressing Towards Goal  No ulcer noted. Problem: Heart Failure: Discharge Outcomes  Goal: *Left ventricular function assessment completed prior to or during stay, or planned for post-discharge  Outcome: Resolved/Met Date Met:  02/01/17  LVEF 5-10%  Goal: *Verbalizes understanding/describes prescribed medications  Outcome: Progressing Towards Goal  Pt is continuing to learn and verbalize medications for his diagnosis.    Goal: *Describes smoking cessation resources  Outcome: Progressing Towards Goal  Pt tells nurses that he has quit smoking.

## 2017-02-01 NOTE — DIABETES MGMT
DTC Consult / Follow up Note    Recommendations/ Comments: Recommendations provided to Orville JENKINS: decreasing Lantus from 16 to 10 units daily and increase glimepiride to 4 mg daily. DTC will continue to follow. Consult received for:       [x]      Medication Recommendations and recent DM diagnosis, additional education provided today  Will return if pt discharged on insulin for insulin instruction. Chart reviewed and initial evaluation complete on Fernanda Ross. Patient is a 61 y.o. male with new onset of  Type 2 Diabetes on none at home.      Assessed and instructed patient on the following:   ·  interpretation of lab results, blood sugar goals, hypoglycemia prevention and treatment, SMBG skills, nutrition, site rotation     Encouraged the following:   · dietary modifications: avoid concentrated sweets (eliminate regular soda, fruit juice, etc), regular blood sugar monitorin-3 times daily    Provided patient with the following: [x]             Survival skills education materials                           [x]             Outpatient DTC contact number               [x]             Glucometer - Free Style- instructed pt on use of meter                    A1c:   Lab Results   Component Value Date/Time    Hemoglobin A1c 13.5 2017 05:05 PM       Recent Glucose Results: Lab Results   Component Value Date/Time    GLU 99 2017 05:27 AM    GLUCPOC 165 (H) 2017 12:07 PM    GLUCPOC 121 (H) 2017 07:12 AM    GLUCPOC 174 (H) 2017 09:32 PM        Lab Results   Component Value Date/Time    Creatinine 1.84 2017 05:27 AM       Active Orders   Diet    DIET CARDIAC Regular; Consistent Carb 1800kcal; FR 2000ML        PO intake: Patient Vitals for the past 72 hrs:   % Diet Eaten   17 0906 100 %   17 0800 100 %   17 1800 100 %   17 1200 100 %   17 0800 100 %   17 1800 100 %       Current hospital DM medication: Lantus 16 units daily and Humalog correction, normal sensitivity scale, Glimepiride 2 mg daily    Will continue to follow as needed. Thank you.     Robinson Linn, RD

## 2017-02-01 NOTE — PALLIATIVE CARE
Palliative Medicine Social Work    Dr. Benitez Beavers and I met with patient. He was alert, oriented and engaging. He related an accurate understanding of his heart failure and option for stent. He related, as well, an accurate understanding of his high risk for dying (50%). He has some regrets for neglecting his health for so long. He states the last year has been very difficult. He was quite aware of his declining health and was resigned to his \"terminal\" condition, \"just waiting to die\". He states that financial concerns were the main reason he did not seek medical care, as he was fearful of leaving his wife with debt. He is thankful he made decision to come to the hospital and for this new perspective on life. He has a desire to live and enjoy his family. He is well supported by his wife with whom he lives. He has two sons who live locally and are supportive, as well as 15 grandchildren. He lost a daughter several years ago in MVA. He worked as a  until his health forced him to quite about a year ago. His goal for now is to get stronger so that he can take PO medications and increase the odds for surviving surgery. He plans to talk more with his wife tonight about all decisions. Engaged in discussion about the importance of completing an AMD.  [address] Directive for Healthcare for [place] and Living Will. Also discussed realities and consequences of resuscitative efforts in the context of advanced heart failure. Patient asked for second copy of document for his wife to complete. They will talk more tonight and know that we are available to assist if needed. Will follow up as needed. Thank you for the opportunity to be involved in the care of Mr. Abimael Quach (ud). Blaire Reese, PORTERW, [facility]-  Palliative Medicine   Respecting Choices ® ACP Facilitator   [phone]

## 2017-02-01 NOTE — PROGRESS NOTES
[facility] Progress Note      NAME:  Fernanda Nascimento :   1950   MRN:   [id number]   PCP:  None  CARD:   Dr. Hernandez De La Rosa    Date:  2017     Fernanda Nascimento is a 61 y.o. male with a who presented for further evaluation of severe CAD and systolic heart failure. Subjective:   He was initially seen at [facility] 3 years ago and told that he had heart failure with LVEF 30%. He was lost to follow up due to lack of insurance and has not been seen by a physician in the interim. He complains of progressive fatigue, NAVARRO, PND, and edema over the past year, prompting presentation to [facility]. Currently he is resting comfortably without complaints. He continues with lower extremity bullae which are slightly improved on antibiotics. He denies CP, SOB, lightheadedness, or dizziness. Past 24 hours:  Hemoydnamics stable on milrinone gtt  Weight increased 1 lb from yesterday      Objective:     Visit Vitals    /76 (BP 1 Location: Right arm, BP Patient Position: At rest)    Pulse 94    Temp 96.6 °F (35.9 °C)    Resp 18    Ht 5' 9\" (1.753 m)    Wt 189 lb 6 oz (85.9 kg)    SpO2 97%    BMI 27.97 kg/m2        Physical Exam:    Gen:  WA, WN, NAD  HEENT:  EOMI  Neck:  +JVD  CVS:  S1/S2,  +S3  Pul:  Decreased L base, R side clear  Abd:  Soft, NT/ND  Ext:  1-2+ b/l LE edema, multiple bandages in place, +erythema  Neuro:  No obvious deficits    1901 -  0700  In: 1351.9 [P.O.:599; I.V.:752.9]  Out: 3150 [Urine:3150]  O2 Flow Rate (L/min): 2 l/min O2 Device: Room air  Temp (24hrs), Av.4 °F (36.3 °C), Min:96.6 °F (35.9 °C), Max:97.8 °F (36.6 °C)          Care Plan discussed with:    Comments   Patient x    Family      RN x    Care Manager                    Consultant:          Past History:     Past Medical History   Diagnosis Date    Cardiomyopathy ([address] 2017     A. Echo (17):  EF 5-10% with severe GHK,. Mildly dil LA. Mild TR. PASP 46.        No past surgical history on file. Social History   Substance Use Topics    Smoking status: Not on file    Smokeless tobacco: Not on file    Alcohol use Not on file        No family history on file. Allergies:   No Known Allergies       Data Review:     CXR:   CXR Results  (Last 48 hours)               01/30/17 1702  XR CHEST PA LAT Final result    Impression:  IMPRESSION:    Persistent left basilar airspace disease, compatible with pneumonia or   atelectasis. New small to moderate left pleural effusion. Narrative:  INDICATION: CHF       COMPARISON: January 28, 2017       FINDINGS: PA and lateral views of the chest demonstrate a stable   cardiomediastinal silhouette. Left arm PICC line is unchanged in position, with   its tip overlying the superior vena cava. Left basilar airspace disease is   unchanged. There is a new small to moderate left pleural effusion. The   visualized osseous structures are unremarkable. Echocardiogram:   Echo Results  (Last 48 hours)    None          No results found for this visit on 01/20/17.       ECG:  EKG: ST with occasional PVC, NSIVCD, LAE    LABS:  Recent Results (from the past 24 hour(s))   GLUCOSE, POC    Collection Time: 01/31/17  4:51 PM   Result Value Ref Range    Glucose (POC) 197 (H) 65 - 100 mg/dL    Performed by [address], POC    Collection Time: 01/31/17  9:32 PM   Result Value Ref Range    Glucose (POC) 174 (H) 65 - 100 mg/dL    Performed by Unkown     METABOLIC PANEL, COMPREHENSIVE    Collection Time: 02/01/17  5:27 AM   Result Value Ref Range    Sodium 132 (L) 136 - 145 mmol/L    Potassium 4.1 3.5 - 5.1 mmol/L    Chloride 96 (L) 97 - 108 mmol/L    CO2 27 21 - 32 mmol/L    Anion gap 9 5 - 15 mmol/L    Glucose 99 65 - 100 mg/dL    BUN 38 (H) 6 - 20 MG/DL    Creatinine 1.84 (H) 0.70 - 1.30 MG/DL    BUN/Creatinine ratio 21 (H) 12 - 20      GFR est AA 45 (L) >60 ml/min/1.73m2    GFR est non-AA 37 (L) >60 ml/min/1.73m2 Calcium 9.1 8.5 - 10.1 MG/DL    Bilirubin, total 0.4 0.2 - 1.0 MG/DL    ALT <6 (L) 12 - 78 U/L    AST 13 (L) 15 - 37 U/L    Alk. phosphatase 89 45 - 117 U/L    Protein, total 6.5 6.4 - 8.2 g/dL    Albumin 2.6 (L) 3.5 - 5.0 g/dL    Globulin 3.9 2.0 - 4.0 g/dL    A-G Ratio 0.7 (L) 1.1 - 2.2     MAGNESIUM    Collection Time: 02/01/17  5:27 AM   Result Value Ref Range    Magnesium 2.1 1.6 - 2.4 mg/dL   CBC W/O DIFF    Collection Time: 02/01/17  5:27 AM   Result Value Ref Range    WBC 5.8 4.1 - 11.1 K/uL    RBC 4.35 4.10 - 5.70 M/uL    HGB 10.5 (L) 12.1 - 17.0 g/dL    HCT 33.5 (L) 36.6 - 50.3 %    MCV 77.0 (L) 80.0 - 99.0 FL    MCH 24.1 (L) 26.0 - 34.0 PG    MCHC 31.3 30.0 - 36.5 g/dL    RDW 16.5 (H) 11.5 - 14.5 %    PLATELET 99 (L) 004 - 400 K/uL   PTT    Collection Time: 02/01/17  5:27 AM   Result Value Ref Range    aPTT 30.3 22.1 - 32.5 sec    aPTT, therapeutic range     58.0 - 77.0 SECS   GLUCOSE, POC    Collection Time: 02/01/17  7:12 AM   Result Value Ref Range    Glucose (POC) 121 (H) 65 - 100 mg/dL    Performed by Norm Hull      All Micro Results     Procedure Component Value Units Date/Time    CULTURE, BODY FLUID Canda Oven STAIN [[id number]] Collected:  01/26/17 1505    Order Status:  Completed Specimen:  Thoracentesis Updated:  01/30/17 1057     Special Requests: NO SPECIAL REQUESTS        GRAM STAIN OCCASIONAL  WBCS SEEN         NO ORGANISMS SEEN        Culture result: NO GROWTH 4 DAYS       CULTURE, Shalini Solitario STAIN [[id number]] Collected:  01/25/17 6176    Order Status:  Completed Specimen:  Leg Updated:  01/27/17 1440     Special Requests: NO SPECIAL REQUESTS        GRAM STAIN RARE  WBCS SEEN         NO ORGANISMS SEEN        Culture result: LIGHT  STREPTOCOCCI, BETA HEMOLYTIC GROUP C  . .. Penicillin and ampicillin are drugs of choice for treatment of beta-hemolytic streptococcal infections.  Susceptibility testing of penicillins and beta-lactams approved by the FDA for treatment of beta-hemolytic streptococcal infections need not be performed routinely, because nonsusceptible isolates are extremely rare. CLSI 2012         MODERATE  BETTIE TROPICALIS             Specimen Source   1: Pleural Fluid   CYTOLOGIC INTERPRETATION:   Scattered mesothelial cells with degenerative changes and mixed inflammatory cells   General Categorization   No cells diagnostic for malignancy   Specimen Adequacy   Satisfactory for evaluation        IMPRESSION  1. Markedly dilated left ventricle with 3-D end-diastolic volume index to body  surface area of 153 mL/sq m. Mild eccentric left ventricular hypertrophy with  3-D mass index to body surface area of 85 g/sq m. Severe left ventricular  systolic dysfunction. Severe global hypokinesis with regional variation. 3-D  LVEF 10%. 2. Moderately dilated right ventricle by 3-D volumetric assessment. RV end  diastolic volume indexed to body surface area of 138 mL/sq m. Moderate to severe  right ventricular systolic dysfunction. Global hypokinesis. 3-D RVEF 25%. 3. Apical a tethered mitral valve leaflets. Mild mitral regurgitation. 4. Moderately severe 3+ tricuspid regurgitation. 5. On LGE study, the anterior wall, anteroseptal wall, anteroapical wall, and  anterior lateral wall are largely viable without significant myocardial  infarction. The entire inferior wall and inferoseptal wall are completely viable  without any infarct. The base to mid inferolateral wall demonstrate a near  transmural greater than 75% thickness infarct with minimal or no viable  myocardium in this territory. The distal inferolateral wall, apical lateral wall  does not demonstrate any infarct and are largely viable. Based on the viability  imaging, the entire LAD territory is largely viable and should recover upon  revascularization. The entire RCA territory is largely viable and should recover  upon revascularization. The LCx territory demonstrate medium-size infarct with  limited viability.   6. Large left-sided pleural effusion with passive atelectasis of the left lung. 7. Dilated branch pulmonary arteries suggest pulmonary hypertension. 8. Markedly dilated left atrium measuring 59 x 55 mm. Moderately dilated right  atrium measuring 46 x 56 mm.     Medications reviewed:    Current Facility-Administered Medications   Medication Dose Route Frequency    gabapentin (NEURONTIN) capsule 100 mg  100 mg Oral BID    bumetanide (BUMEX) tablet 2 mg  2 mg Oral TID    milrinone (PRIMACOR) 20 MG/100 ML D5W infusion  0.3 mcg/kg/min IntraVENous CONTINUOUS    carvedilol (COREG) tablet 3.125 mg  3.125 mg Oral BID WITH MEALS    amiodarone (CORDARONE) tablet 200 mg  200 mg Oral Q12H    bacitracin 500 unit/gram packet 1 Packet  1 Packet Topical PRN    atorvastatin (LIPITOR) tablet 10 mg  10 mg Oral DAILY    insulin glargine (LANTUS) injection 16 Units  16 Units SubCUTAneous DAILY    glimepiride (AMARYL) tablet 2 mg  2 mg Oral ACB    ceFAZolin (ANCEF) 1 g in 0.9% sodium chloride (MBP/ADV) 50 mL  1 g IntraVENous Q8H    0.9% sodium chloride infusion  10 mL/hr IntraVENous CONTINUOUS    acetaminophen (TYLENOL) tablet 650 mg  650 mg Oral Q6H PRN    aspirin delayed-release tablet 81 mg  81 mg Oral DAILY    glucagon (GLUCAGEN) injection 1 mg  1 mg IntraMUSCular PRN    glucose chewable tablet 16 g  4 Tab Oral PRN    insulin lispro (HUMALOG) injection   SubCUTAneous AC&HS    magnesium oxide (MAG-OX) tablet 400 mg  400 mg Oral DAILY    0.9% sodium chloride infusion  3 mL/hr IntraVENous CONTINUOUS    sodium chloride (NS) flush 5-10 mL  5-10 mL IntraVENous Q8H    sodium chloride (NS) flush 5-10 mL  5-10 mL IntraVENous PRN    albuterol (PROVENTIL VENTOLIN) nebulizer solution 2.5 mg  2.5 mg Nebulization Q4H PRN    diphenhydrAMINE (BENADRYL) capsule 25 mg  25 mg Oral QHS PRN    oxyCODONE-acetaminophen (PERCOCET) 5-325 mg per tablet 2 Tab  2 Tab Oral Q4H PRN    ELECTROLYTE REPLACEMENT PROTOCOL  1 Each Other PRN IMPRESSION:   1. Acute on chronic systolic heart failure (ICM) - Stage D, NYHA Class IV  2. Severe native coronary artery disease w/ viability  3. Heparin induced antibody +  4. Diabetes Mellitus, A1C of 13.5  5. History of tobacco abuse  6. Cellulitis  7. CHACORTA on CKD3  8. Pulmonary HTN  9. Persistent atrial fibrillation  10. Hyponatremia  11. Left pleural effusion  12. Anemia       PLAN:   1. Continue milrinone at 0.3 mcg/kg/min, Continue low dose Coreg and Bumex, Strict I/Os, Replete electrolytes prn, Hold ACE-I due to CHACORTA on CKD. Daily weights  2. Continue ASA, statin, low dose BB; no ACEi d/t renal dysfunction,  too high risk for CABG d/t low LVEF and diabetes out of control - considering high risk PCI Dr. Rosalio Rios and Dr. Eve Plascencia d/w cardiology  3. Hep antibody positive - REJI sent. 4. Consult DTC clinic for recommendations  5. IV cefazolin 1 gram q 8 hours per ID  6. Smoking cessation counseling, nicotine patch  7. B/L foot pumps for DVT prophylaxis - pt will not tolerate SCDs d/t bullae on lower legs, f/u REJI results  8. F/U PFTs  9.   Trend H/H has remained stable       Pt seen with Dr. Eve Plascencia and Dr. Mani Martinez    Thank you for letting me see him with you,    Rivka Short PA-C    Saw patient, agree with above  Risk of morbidity and mortality - high  Medical decision making - high complexity

## 2017-02-01 NOTE — PROGRESS NOTES
Problem: Falls - Risk of  Goal: *Absence of falls  Outcome: Progressing Towards Goal  Using call bell to ask for assistance, gripper socks on    Problem: Pressure Ulcer - Risk of  Goal: *Prevention of pressure ulcer  Outcome: Progressing Towards Goal  Up to chair, turned, areas of pressure offloaded.

## 2017-02-01 NOTE — PROGRESS NOTES
1930: Bedside shift change report given to Cami Gee (oncoming nurse) by Radha Jordan RN (offgoing nurse). Report included the following information SBAR, Kardex, ED Summary, Procedure Summary, Intake/Output, MAR, Recent Results, Med Rec Status, Cardiac Rhythm NSR and Alarm Parameters . 2330:Bedside shift change report given to Gabriel Monaco (oncoming nurse) by Cami Gee (offgoing nurse). Report included the following information SBAR, Kardex, ED Summary, Procedure Summary, Intake/Output, MAR, Accordion, Recent Results, Med Rec Status, Cardiac Rhythm NSR and Alarm Parameters .

## 2017-02-01 NOTE — PROGRESS NOTES
[facility]   [address] [facility], [address] [place] [address], [facility]  Phone: [phone]   [phone]       Nephrology Progress Note  Prosper Ribeiro.     1950     [id number]  Date of Admission : 1/20/2017 02/01/17    CC: Follow up for CKD/ARF      Assessment and Plan   CHACORTA on CKD :  - CHACORTA likely 2/2 cardiorenal syndrome + IVVD from diuretics   - Cr stable with diuresis . - continue Bumex to 2 mg TID    Thrombocytopenia :  -  CECILIA Ab +ve. Ordered REJI  -  Other possibilities :  Ancef , hypersplenism from alcoholism    Peripheral Neuropathy :  On Gabapentin     Hyponatremia :  - combination of  CHF + SIADH from chronic alcoholism  - Na at 132 today   - Diuresis w/ loops . AVOID THIAZIDES   - Tolvaptan when Na < 130    Acute on Chronic Systolic CHF w/ severe CMP  - echo with EF 5-10%, severely dilated LV, severe TR  - Multivessel CAD : MRI showed viable myocardium   - on Milrinone     Cellulitis RLE w/ Pustular lesions : On Ancef per ID    Pulm HTN     Chronic smoker w/VENANCIO -PNA         Interval History:  Cardiac MRI findings noted   He is on the phone with Dr Oj Collazo discussing PCI risk   Abdominal distension improving  No cp, sob, n/v/d reported. Review of Systems: Pertinent items are noted in HPI.     Current Medications:   Current Facility-Administered Medications   Medication Dose Route Frequency    gabapentin (NEURONTIN) capsule 100 mg  100 mg Oral BID    bumetanide (BUMEX) tablet 2 mg  2 mg Oral TID    milrinone (PRIMACOR) 20 MG/100 ML D5W infusion  0.3 mcg/kg/min IntraVENous CONTINUOUS    carvedilol (COREG) tablet 3.125 mg  3.125 mg Oral BID WITH MEALS    amiodarone (CORDARONE) tablet 200 mg  200 mg Oral Q12H    bacitracin 500 unit/gram packet 1 Packet  1 Packet Topical PRN    atorvastatin (LIPITOR) tablet 10 mg  10 mg Oral DAILY    insulin glargine (LANTUS) injection 16 Units  16 Units SubCUTAneous DAILY    glimepiride (AMARYL) tablet 2 mg  2 mg Oral ACB    ceFAZolin (ANCEF) 1 g in 0.9% sodium chloride (MBP/ADV) 50 mL  1 g IntraVENous Q8H    0.9% sodium chloride infusion  10 mL/hr IntraVENous CONTINUOUS    acetaminophen (TYLENOL) tablet 650 mg  650 mg Oral Q6H PRN    aspirin delayed-release tablet 81 mg  81 mg Oral DAILY    glucagon (GLUCAGEN) injection 1 mg  1 mg IntraMUSCular PRN    glucose chewable tablet 16 g  4 Tab Oral PRN    insulin lispro (HUMALOG) injection   SubCUTAneous AC&HS    magnesium oxide (MAG-OX) tablet 400 mg  400 mg Oral DAILY    0.9% sodium chloride infusion  3 mL/hr IntraVENous CONTINUOUS    sodium chloride (NS) flush 5-10 mL  5-10 mL IntraVENous Q8H    sodium chloride (NS) flush 5-10 mL  5-10 mL IntraVENous PRN    albuterol (PROVENTIL VENTOLIN) nebulizer solution 2.5 mg  2.5 mg Nebulization Q4H PRN    diphenhydrAMINE (BENADRYL) capsule 25 mg  25 mg Oral QHS PRN    oxyCODONE-acetaminophen (PERCOCET) 5-325 mg per tablet 2 Tab  2 Tab Oral Q4H PRN    ELECTROLYTE REPLACEMENT PROTOCOL  1 Each Other PRN      No Known Allergies    Objective:  Vitals:    Vitals:    01/31/17 2337 02/01/17 0028 02/01/17 0450 02/01/17 0831   BP: 105/64  120/68 116/76   Pulse: 86 84 87 94   Resp: 18  18 18   Temp: 97.4 °F (36.3 °C)  97.8 °F (36.6 °C) 96.6 °F (35.9 °C)   SpO2: 95%  92% 97%   Weight:   85.9 kg (189 lb 6 oz)    Height:         Intake and Output:  02/01 0701 - 02/01 1900  In: 647.4 [P.O.:240;  I.V.:407.4]  Out: 450 [Urine:450]  01/30 1901 - 02/01 0700  In: 1351.9 [P.O.:599; I.V.:752.9]  Out: 3150 [Urine:3150]    Physical Examination:  General: Awake, alert  Resp:  Lungs mostly clear  CV:  RRR,  no murmur or rub,+ LE edema  GI:  Soft, NT, + Bowel sounds, no hepatosplenomegaly  Neurologic:  Non focal  Skin:  RLE cellulitis   :  No mendoza    []    High complexity decision making was performed  []    Patient is at high-risk of decompensation with multiple organ involvement    Lab Data Personally Reviewed: I have reviewed all the pertinent labs, microbiology data and radiology studies during assessment. Recent Labs      02/01/17 0527 01/31/17 0452  01/30/17   0416   NA  132*  135*  131*   K  4.1  4.5  3.9   CL  96*  97  94*   CO2  27  30  29   GLU  99  109*  82   BUN  38*  42*  40*   CREA  1.84*  1.98*  1.94*   CA  9.1  9.1  8.5   MG  2.1  1.9  2.0   ALB  2.6*  2.6*  2.5*   SGOT  13*  12*  14*   ALT  <6*  7*  8*     Recent Labs      02/01/17 0527 01/31/17 0452 01/30/17   0416   WBC  5.8  5.4  5.7   HGB  10.5*  10.0*  10.0*   HCT  33.5*  31.9*  31.8*   PLT  99*  86*  111*     No results found for: SDES  Lab Results   Component Value Date/Time    Culture result: NO GROWTH 4 DAYS 01/26/2017 03:05 PM    Culture result:  01/25/2017 09:34 PM     LIGHT  STREPTOCOCCI, BETA HEMOLYTIC GROUP C  . .. Penicillin and ampicillin are drugs of choice for treatment of beta-hemolytic streptococcal infections. Susceptibility testing of penicillins and beta-lactams approved by the FDA for treatment of beta-hemolytic streptococcal infections need not be performed routinely, because nonsusceptible isolates are extremely rare.  CLSI 2012      Culture result: MODERATE  CANDIDA TROPICALIS   01/25/2017 09:34 PM    Culture result: NO SIGNIFICANT GROWTH 01/19/2017 01:35 AM     Recent Results (from the past 24 hour(s))   GLUCOSE, POC    Collection Time: 01/31/17  4:51 PM   Result Value Ref Range    Glucose (POC) 197 (H) 65 - 100 mg/dL    Performed by Monik Denney, POC    Collection Time: 01/31/17  9:32 PM   Result Value Ref Range    Glucose (POC) 174 (H) 65 - 100 mg/dL    Performed by Scottwvivian     METABOLIC PANEL, COMPREHENSIVE    Collection Time: 02/01/17  5:27 AM   Result Value Ref Range    Sodium 132 (L) 136 - 145 mmol/L    Potassium 4.1 3.5 - 5.1 mmol/L    Chloride 96 (L) 97 - 108 mmol/L    CO2 27 21 - 32 mmol/L    Anion gap 9 5 - 15 mmol/L    Glucose 99 65 - 100 mg/dL    BUN 38 (H) 6 - 20 MG/DL    Creatinine 1.84 (H) 0.70 - 1.30 MG/DL    BUN/Creatinine ratio 21 (H) 12 - 20      GFR est AA 45 (L) >60 ml/min/1.73m2    GFR est non-AA 37 (L) >60 ml/min/1.73m2    Calcium 9.1 8.5 - 10.1 MG/DL    Bilirubin, total 0.4 0.2 - 1.0 MG/DL    ALT <6 (L) 12 - 78 U/L    AST 13 (L) 15 - 37 U/L    Alk. phosphatase 89 45 - 117 U/L    Protein, total 6.5 6.4 - 8.2 g/dL    Albumin 2.6 (L) 3.5 - 5.0 g/dL    Globulin 3.9 2.0 - 4.0 g/dL    A-G Ratio 0.7 (L) 1.1 - 2.2     MAGNESIUM    Collection Time: 02/01/17  5:27 AM   Result Value Ref Range    Magnesium 2.1 1.6 - 2.4 mg/dL   CBC W/O DIFF    Collection Time: 02/01/17  5:27 AM   Result Value Ref Range    WBC 5.8 4.1 - 11.1 K/uL    RBC 4.35 4.10 - 5.70 M/uL    HGB 10.5 (L) 12.1 - 17.0 g/dL    HCT 33.5 (L) 36.6 - 50.3 %    MCV 77.0 (L) 80.0 - 99.0 FL    MCH 24.1 (L) 26.0 - 34.0 PG    MCHC 31.3 30.0 - 36.5 g/dL    RDW 16.5 (H) 11.5 - 14.5 %    PLATELET 99 (L) 349 - 400 K/uL   PTT    Collection Time: 02/01/17  5:27 AM   Result Value Ref Range    aPTT 30.3 22.1 - 32.5 sec    aPTT, therapeutic range     58.0 - 77.0 SECS   GLUCOSE, POC    Collection Time: 02/01/17  7:12 AM   Result Value Ref Range    Glucose (POC) 121 (H) 65 - 100 mg/dL    Performed by Ty Barragan            I have reviewed the flowsheets. Chart and Pertinent Notes have been reviewed. No change in PMH ,family and social history from Consult note.       Roddy Sandoval MD

## 2017-02-01 NOTE — PROGRESS NOTES
Problem: Falls - Risk of  Goal: *Absence of falls  Outcome: Progressing Towards Goal  Call bell within reach, personal belongings in reach, bed locked and in low position. Non skid footwear in placed. Goal: *Knowledge of fall prevention  Outcome: Progressing Towards Goal  Uses the call bell appropriately to call for assistance         Problem: Pressure Ulcer - Risk of  Goal: *Prevention of pressure ulcer  Outcome: Progressing Towards Goal  Q4H skin assessed, Turns self at appropriate intervals and blood glucose controlled         Bedside and Verbal shift change report given to Quan Dennis RN (oncoming nurse) by Charan Ji RN (offgoing nurse). Report included the following information SBAR, Kardex, ED Summary, Intake/Output, MAR, Recent Results, Med Rec Status and Cardiac Rhythm NSR.

## 2017-02-01 NOTE — PROGRESS NOTES
Problem: Mobility Impaired (Adult and Pediatric)  Goal: *Acute Goals and Plan of Care (Insert Text)  Physical Therapy Goals  1/30/2017-Goals re-evaluated on 1/30/2017      1. Patient will ambulate with modified independence for 300 feet with the least restrictive device within 7 day(s). 2. Patient will ascend/descend 4 stairs with 0 handrail(s) with modified independence within 7 day(s). Initiated 1/21/2017  1. Patient will move from supine to sit and sit to supine in bed with modified independence within 7 day(s). -MET  2. Patient will transfer from bed to chair and chair to bed with modified independence using the least restrictive device within 7 day(s). -MET  3. Patient will perform sit to stand with modified independence within 7 day(s). -MET  4. Patient will ambulate with modified independence for 200 feet with the least restrictive device within 7 day(s). -MET  5. Patient will ascend/descend 4 stairs with 0 handrail(s) with modified independence within 7 day(s). -continue, LOB with stairs on 1/30/2017     PHYSICAL THERAPY TREATMENT  Patient: Daphne Fuller (64 y.o. male)  Date: 2/1/2017  Diagnosis: CAD  Systolic heart failure (HCC) <principal problem not specified>       Precautions:  standard      ASSESSMENT: Patient standing in room on arrival.  Patient able to ambulate 200 feet with supervision and negotiate 4 steps with 2 rails with supervision. Patient safe to ambulate in hallway with nursing. He is close to independent. Progression toward goals:  [X]    Improving appropriately and progressing toward goals  [ ]    Improving slowly and progressing toward goals  [ ]    Not making progress toward goals and plan of care will be adjusted       PLAN:  Patient continues to benefit from skilled intervention to address the above impairments. Continue treatment per established plan of care.   Discharge Recommendations:  Outpatient, None and To Be Determined  Further Equipment Recommendations for Discharge: none       SUBJECTIVE:   Patient stated I can walk.       OBJECTIVE DATA SUMMARY:   Critical Behavior:  Neurologic State: Alert  Orientation Level: Oriented X4  Cognition: Appropriate for age attention/concentration, Appropriate safety awareness, Follows commands  Safety/Judgement: Awareness of environment  Functional Mobility Training:  Bed Mobility:   Not assessed, patient standing in room on arrival and returned to chair   Transfers:  Sit to Stand: Supervision  Stand to Sit: Supervision      Balance:  Sitting: Intact  Standing: Intact  Standing - Static: Good  Standing - Dynamic : Good  Ambulation/Gait Training:  Distance (ft): 200 Feet (ft)   Ambulation - Level of Assistance: Supervision   Gait Abnormalities: Decreased step clearance   Base of Support: Narrowed   Speed/Veronica: Slow      Stairs:  Number of Stairs Trained: 4  Stairs - Level of Assistance: Supervision              Rail Use: Both     Pain:  Pain Scale 1: Numeric (0 - 10)  Pain Intensity 1: 4  Pain Location 1: Leg;Foot  Pain Orientation 1: Left;Right  Pain Description 1: Constant; Throbbing  Pain Intervention(s) 1: Medication (see MAR)  Activity Tolerance:      Please refer to the flowsheet for vital signs taken during this treatment.   After treatment:   [X]    Patient left in no apparent distress sitting up in chair  [ ]    Patient left in no apparent distress in bed  [X]    Call bell left within reach  [X]    Nursing notified  [ ]    Caregiver present  [ ]    Bed alarm activated      COMMUNICATION/COLLABORATION:   The patients plan of care was discussed with: Registered Nurse     Beverly Forte PT   Time Calculation: 16 mins

## 2017-02-01 NOTE — PROGRESS NOTES
1900: TRANSFER - IN REPORT:    Verbal report received from Mick (name) on Fernanda Booth.  being received from [facility] (unit) for routine progression of care      Report consisted of patients Situation, Background, Assessment and   Recommendations(SBAR). Information from the following report(s) SBAR, Kardex, [facility] and Recent Results was reviewed with the receiving nurse. Opportunity for questions and clarification was provided. Assessment completed upon patients arrival to unit and care assumed. 1915: Received pt from [facility] via wheelchair, tele reapplied and vitals obtained. Oriented to new room and surroundings, no needs. Call bell in reach. 1930: Bedside and Verbal shift change report given to Cathy Barajas (oncoming nurse) by Bj Gann (offgoing nurse). Report included the following information SBAR, Kardex, MAR and Recent Results.

## 2017-02-01 NOTE — PROGRESS NOTES
I spoke with Mr. Javier Chung via phone regarding high-risk PCI of left main with Impella. He is currently thinking about it. No decision thus far.

## 2017-02-01 NOTE — PROGRESS NOTES
Infectious Diseases Progress Note    Antibiotic Summary:  Levaquin  --   Vancomycin  --   Ancef    -- present      Subjective:     He says his legs feel better daily    Objective:     Vitals:   Visit Vitals    /62 (BP 1 Location: Right arm, BP Patient Position: Sitting)    Pulse 80    Temp 97.7 °F (36.5 °C)    Resp 18    Ht 5' 9\" (1.753 m)    Wt 85.6 kg (188 lb 11.4 oz)    SpO2 95%    BMI 27.87 kg/m2        Tmax:  Temp (24hrs), Av.8 °F (36.6 °C), Min:97.4 °F (36.3 °C), Max:98.1 °F (36.7 °C)      Exam:  General appearance: alert, no distress  Lungs: few rales at bases  Heart: RRR  Abdomen: soft and nontender  Left leg: erythema improving; no ulcers  Right leg: erythema better; superficial ulcers on the right leg are drying up. IV Lines: Left PICC inserted     Labs:    Recent Labs      17   0452  17   0416  17   0418   WBC  5.4  5.7  5.8   HGB  10.0*  10.0*  10.3*   PLT  86*  111*  122*   BUN  42*  40*  37*   CREA  1.98*  1.94*  1.76*   TBILI  0.4  0.5  0.5   SGOT  12*  14*  12*   AP  86  85  81     Culture right leg ulcers  = group C Streptococcus    Assessment:     1. Bilateral venous stasis disease of the LEs +/- cellulitis: Improving     2. OHD -- IHD/CAD; CHF; pulm HTN     3. CRF with acute exacerbation     4. NIDDM -- new diagnosis     5. Probable COPD -- heavy smoking since age 7      10. Anemia -- HC/MC -- positive family history of colon cancer -- may need GI W/U    Plan:     1.  Ellen Taylor MD

## 2017-02-01 NOTE — PROGRESS NOTES
[facility] Progress Note      NAME:  Fernanda Funk. :   1950   MRN:   [id number]   PCP:  None  CARD:   Dr. Hannah Real    Date:  2017     Fernanda Funk. is a 61 y.o. male with a who presented for further evaluation of severe CAD and systolic heart failure. Subjective:   He was initially seen at [address] [place] [address] 3 years ago and told that he had heart failure with LVEF 30%. He was lost to follow up due to lack of insurance and has not been seen by a physician in the interim. He complains of progressive fatigue, NAVARRO, PND, and edema over the past year, prompting presentation to [facility]. He also complains of lower extremity bullae, weeping, and pain. He denies palpitations, presyncope, syncope, or chest pain. Past 24 hours:  Cardiac MRI with significant viability  No malignant cells in pleural fluid  Denies dyspnea  Complains of fatigue      Objective:     Visit Vitals    /73 (BP 1 Location: Right arm, BP Patient Position: Sitting; At rest)    Pulse 92    Temp 97.6 °F (36.4 °C)    Resp 20    Ht 5' 9\" (1.753 m)    Wt 85.9 kg (189 lb 6 oz)    SpO2 92%    BMI 27.97 kg/m2          General:  fatigued    HEENT: Normocephalic, EOMI, PERRLA, Hearing intact, trachea mid-line    Neck:  supple, no significant adenopathy, carotids upstroke normal bilaterally, no bruits    CVP:  10  cm  ( + ) HJR    Heart:  Diminished PMI    Normal S1 and S2, S3 gallop    Murmur: 2/6 systolic murmur    Lungs: Diminished breath sounds left lower lung    Abdomen: soft, non-tender. Bowel sounds normal. +HSM    Extremity: Warm, red on the right lower extremity with 2+ pitting edema bilaterally    Neuro: Alert and oriented to person, place, and time; normal strength and tone. Normal symmetric reflexes.  Normal coordination and gait      1901 -  0700  In: 1351.9 [P.O.:599; I.V.:752.9]  Out: 3150 [Urine:3150]  O2 Flow Rate (L/min): 2 l/min O2 Device: Room air  Temp (24hrs), Av.6 °F (36.4 °C), Min:96.6 °F (35.9 °C), Max:98.4 °F (36.9 °C)          Care Plan discussed with:    Comments   Patient x    Family      RN x    Care Manager                    Consultant:          Past History:     Past Medical History   Diagnosis Date    Cardiomyopathy ([address] 1/20/2017     A. Echo (1/19/17):  EF 5-10% with severe GHK,. Mildly dil LA. Mild TR. PASP 46. No past surgical history on file. Social History   Substance Use Topics    Smoking status: Not on file    Smokeless tobacco: Not on file    Alcohol use Not on file        No family history on file. Allergies:   No Known Allergies       Data Review:     CXR:   CXR Results  (Last 48 hours)    None            Echocardiogram:   Echo Results  (Last 48 hours)    None          No results found for this visit on 01/20/17. ECG:  EKG: ST with occasional PVC, NSIVCD, LAE    LABS:  Recent Results (from the past 24 hour(s))   GLUCOSE, POC    Collection Time: 01/31/17  9:32 PM   Result Value Ref Range    Glucose (POC) 174 (H) 65 - 100 mg/dL    Performed by Unkown     METABOLIC PANEL, COMPREHENSIVE    Collection Time: 02/01/17  5:27 AM   Result Value Ref Range    Sodium 132 (L) 136 - 145 mmol/L    Potassium 4.1 3.5 - 5.1 mmol/L    Chloride 96 (L) 97 - 108 mmol/L    CO2 27 21 - 32 mmol/L    Anion gap 9 5 - 15 mmol/L    Glucose 99 65 - 100 mg/dL    BUN 38 (H) 6 - 20 MG/DL    Creatinine 1.84 (H) 0.70 - 1.30 MG/DL    BUN/Creatinine ratio 21 (H) 12 - 20      GFR est AA 45 (L) >60 ml/min/1.73m2    GFR est non-AA 37 (L) >60 ml/min/1.73m2    Calcium 9.1 8.5 - 10.1 MG/DL    Bilirubin, total 0.4 0.2 - 1.0 MG/DL    ALT (SGPT) <6 (L) 12 - 78 U/L    AST (SGOT) 13 (L) 15 - 37 U/L    Alk.  phosphatase 89 45 - 117 U/L    Protein, total 6.5 6.4 - 8.2 g/dL    Albumin 2.6 (L) 3.5 - 5.0 g/dL    Globulin 3.9 2.0 - 4.0 g/dL    A-G Ratio 0.7 (L) 1.1 - 2.2     MAGNESIUM    Collection Time: 02/01/17  5:27 AM   Result Value Ref Range    Magnesium 2.1 1.6 - 2.4 mg/dL   CBC W/O DIFF    Collection Time: 02/01/17  5:27 AM   Result Value Ref Range    WBC 5.8 4.1 - 11.1 K/uL    RBC 4.35 4.10 - 5.70 M/uL    HGB 10.5 (L) 12.1 - 17.0 g/dL    HCT 33.5 (L) 36.6 - 50.3 %    MCV 77.0 (L) 80.0 - 99.0 FL    MCH 24.1 (L) 26.0 - 34.0 PG    MCHC 31.3 30.0 - 36.5 g/dL    RDW 16.5 (H) 11.5 - 14.5 %    PLATELET 99 (L) 189 - 400 K/uL   PTT    Collection Time: 02/01/17  5:27 AM   Result Value Ref Range    aPTT 30.3 22.1 - 32.5 sec    aPTT, therapeutic range     58.0 - 77.0 SECS   GLUCOSE, POC    Collection Time: 02/01/17  7:12 AM   Result Value Ref Range    Glucose (POC) 121 (H) 65 - 100 mg/dL    Performed by Bernie Tovar    GLUCOSE, POC    Collection Time: 02/01/17 12:07 PM   Result Value Ref Range    Glucose (POC) 165 (H) 65 - 100 mg/dL    Performed by Héctor Mg 92, POC    Collection Time: 02/01/17  4:40 PM   Result Value Ref Range    Glucose (POC) 93 65 - 100 mg/dL    Performed by Unkown       All Micro Results     Procedure Component Value Units Date/Time    CULTURE, BODY FLUID Earlis Lines STAIN [[id number]] Collected:  01/26/17 1505    Order Status:  Completed Specimen:  Thoracentesis Updated:  01/30/17 1057     Special Requests: NO SPECIAL REQUESTS        GRAM STAIN OCCASIONAL  WBCS SEEN         NO ORGANISMS SEEN        Culture result: NO GROWTH 4 DAYS       CULTURE, Hilda Mo STAIN [[id number]] Collected:  01/25/17 2134    Order Status:  Completed Specimen:  Leg Updated:  01/27/17 1440     Special Requests: NO SPECIAL REQUESTS        GRAM STAIN RARE  WBCS SEEN         NO ORGANISMS SEEN        Culture result: LIGHT  STREPTOCOCCI, BETA HEMOLYTIC GROUP C  . .. Penicillin and ampicillin are drugs of choice for treatment of beta-hemolytic streptococcal infections. Susceptibility testing of penicillins and beta-lactams approved by the FDA for treatment of beta-hemolytic streptococcal infections need not be performed routinely, because nonsusceptible isolates are extremely rare.  CLSI 2012         MODERATE  BETTIE TROPICALIS             Specimen Source   1: Pleural Fluid   CYTOLOGIC INTERPRETATION:   Scattered mesothelial cells with degenerative changes and mixed inflammatory cells   General Categorization   No cells diagnostic for malignancy   Specimen Adequacy   Satisfactory for evaluation        IMPRESSION  1. Markedly dilated left ventricle with 3-D end-diastolic volume index to body  surface area of 153 mL/sq m. Mild eccentric left ventricular hypertrophy with  3-D mass index to body surface area of 85 g/sq m. Severe left ventricular  systolic dysfunction. Severe global hypokinesis with regional variation. 3-D  LVEF 10%. 2. Moderately dilated right ventricle by 3-D volumetric assessment. RV end  diastolic volume indexed to body surface area of 138 mL/sq m. Moderate to severe  right ventricular systolic dysfunction. Global hypokinesis. 3-D RVEF 25%. 3. Apical a tethered mitral valve leaflets. Mild mitral regurgitation. 4. Moderately severe 3+ tricuspid regurgitation. 5. On LGE study, the anterior wall, anteroseptal wall, anteroapical wall, and  anterior lateral wall are largely viable without significant myocardial  infarction. The entire inferior wall and inferoseptal wall are completely viable  without any infarct. The base to mid inferolateral wall demonstrate a near  transmural greater than 75% thickness infarct with minimal or no viable  myocardium in this territory. The distal inferolateral wall, apical lateral wall  does not demonstrate any infarct and are largely viable. Based on the viability  imaging, the entire LAD territory is largely viable and should recover upon  revascularization. The entire RCA territory is largely viable and should recover  upon revascularization. The LCx territory demonstrate medium-size infarct with  limited viability. 6. Large left-sided pleural effusion with passive atelectasis of the left lung.   7. Dilated branch pulmonary arteries suggest pulmonary hypertension. 8. Markedly dilated left atrium measuring 59 x 55 mm. Moderately dilated right  atrium measuring 46 x 56 mm. Medications reviewed:    Current Facility-Administered Medications   Medication Dose Route Frequency    [START ON 2/2/2017] glimepiride (AMARYL) tablet 4 mg  4 mg Oral ACB    [START ON 2/2/2017] insulin glargine (LANTUS) injection 10 Units  10 Units SubCUTAneous DAILY    gabapentin (NEURONTIN) capsule 100 mg  100 mg Oral BID    bumetanide (BUMEX) tablet 2 mg  2 mg Oral TID    milrinone (PRIMACOR) 20 MG/100 ML D5W infusion  0.3 mcg/kg/min IntraVENous CONTINUOUS    carvedilol (COREG) tablet 3.125 mg  3.125 mg Oral BID WITH MEALS    amiodarone (CORDARONE) tablet 200 mg  200 mg Oral Q12H    bacitracin 500 unit/gram packet 1 Packet  1 Packet Topical PRN    atorvastatin (LIPITOR) tablet 10 mg  10 mg Oral DAILY    ceFAZolin (ANCEF) 1 g in 0.9% sodium chloride (MBP/ADV) 50 mL  1 g IntraVENous Q8H    0.9% sodium chloride infusion  10 mL/hr IntraVENous CONTINUOUS    acetaminophen (TYLENOL) tablet 650 mg  650 mg Oral Q6H PRN    aspirin delayed-release tablet 81 mg  81 mg Oral DAILY    glucagon (GLUCAGEN) injection 1 mg  1 mg IntraMUSCular PRN    glucose chewable tablet 16 g  4 Tab Oral PRN    insulin lispro (HUMALOG) injection   SubCUTAneous AC&HS    magnesium oxide (MAG-OX) tablet 400 mg  400 mg Oral DAILY    0.9% sodium chloride infusion  3 mL/hr IntraVENous CONTINUOUS    sodium chloride (NS) flush 5-10 mL  5-10 mL IntraVENous Q8H    sodium chloride (NS) flush 5-10 mL  5-10 mL IntraVENous PRN    albuterol (PROVENTIL VENTOLIN) nebulizer solution 2.5 mg  2.5 mg Nebulization Q4H PRN    diphenhydrAMINE (BENADRYL) capsule 25 mg  25 mg Oral QHS PRN    oxyCODONE-acetaminophen (PERCOCET) 5-325 mg per tablet 2 Tab  2 Tab Oral Q4H PRN    ELECTROLYTE REPLACEMENT PROTOCOL  1 Each Other PRN           IMPRESSION:   1.  Acute on chronic systolic heart failure (ICM) - Stage D, NYHA Class IV  2. Severe native coronary artery disease  3. Diabetes Mellitus, Hga1c 13.5  4. History of tobacco abuse  5. Cellulitis  6. CHACORTA on CKD3  7. Pulmonary HTN  8. Persistent atrial fibrillation  9. Hyponatremia  10. Left pleural effusion       PLAN:   1. Increase IV milrinone 0.3 mcg/kg/min, Follow I/O, Replete electrolytes, Hold ACE-I due to CHACORTA on CKD. Continue low dose coreg and IV bumex to 2 mg tid  2. Continue ASA, statin, low dose BB; too high risk for CABG d/t low LVEF and diabetes out of control  3. Viable LAD and RCA territory and limited LCx viability - discussed high risk Impella supported Left Main PCI with Mary López and Asa Cannon  4. Lantus and sliding scale insulin, accuchecks, diabetic education  5. IV cefazolin 1 gram q 8 hours  6. Smoking cessation counseling, nicotine patch         Thank you for letting me see him with you,    Dorie Cortez MD, [place] [address] Director  Jennifer Roman [address] [place] [address], [address] [place] [address]  Office: [phone]  Fax: [phone]  24 hour VAD/HF Pager: [phone]

## 2017-02-01 NOTE — PROGRESS NOTES
0800: Assumed care of patient from off going nurse. 1000: Dr. Carmen Gatica at bedside, updated on patient status, new orders placed. 1010: IVANNA JENKINS at bedside, updated on patient status, updated on patient status, no new orders at this time. 1040: Patient taken by critical care transport team to [facility] for a cardiac MRI. Patient taken on a milrinone drip. 1450: patient returned to unit. 1800: Dr. Kehinde Crawford at bedside, updated on patient status. Order for PFT's tomorrow morning. 1845:TRANSFER - OUT REPORT:    Verbal report given to [facility] RN(name) on [employer].  being transferred to [facility](unit) for routine progression of care       Report consisted of patients Situation, Background, Assessment and   Recommendations(SBAR). Information from the following report(s) SBAR and ED Summary was reviewed with the receiving nurse. Lines:   PICC Triple Lumen 01/25/17 Left;Brachial (Active)   Central Line Being Utilized Yes 1/31/2017  8:00 AM   Criteria for Appropriate Use Limited/no vessel suitable for conventional peripheral access 1/31/2017  8:00 AM   Site Assessment Other (Comment) 1/31/2017  8:00 AM   Phlebitis Assessment 0 1/31/2017  8:00 AM   Infiltration Assessment 0 1/31/2017  8:00 AM   Date of Last Dressing Change 01/25/17 1/31/2017  8:00 AM   Dressing Status Clean, dry, & intact 1/31/2017  8:00 AM   External Catheter Length (cm) 0 centimeters 1/31/2017  8:00 AM   Dressing Type Disk with Chlorhexadine Gluconate (CHG); Transparent 1/31/2017  8:00 AM   Action Taken Open ports on tubing capped 1/31/2017  8:00 AM   Hub Color/Line Status White; Infusing 1/31/2017  8:00 AM   Positive Blood Return (Site #1) Yes 1/31/2017  8:00 AM   Hub Color/Line Status Elbridge Prophet; Infusing 1/31/2017  8:00 AM   Positive Blood Return (Site #2) Yes 1/31/2017  8:00 AM   Hub Color/Line Status Red 1/31/2017  8:00 AM   Positive Blood Return (Site #3) Yes 1/31/2017  8:00 AM   Alcohol Cap Used Yes 1/31/2017  8:00 AM       Peripheral IV 01/18/17 Right Forearm (Active)   Site Assessment Other (Comment) 1/31/2017  3:00 PM   Phlebitis Assessment 0 1/31/2017  8:00 AM   Infiltration Assessment 0 1/31/2017  8:00 AM   Dressing Status Clean, dry, & intact 1/31/2017  8:00 AM   Dressing Type Transparent 1/31/2017  8:00 AM   Hub Color/Line Status Pink;Capped 1/31/2017  8:00 AM   Action Taken Open ports on tubing capped 1/31/2017  8:00 AM   Alcohol Cap Used Yes 1/31/2017  8:00 AM        Opportunity for questions and clarification was provided.       Patient transported with:   Monitor  Registered Nurse

## 2017-02-02 LAB
ALBUMIN SERPL BCP-MCNC: 2.6 G/DL (ref 3.5–5)
ALBUMIN/GLOB SERPL: 0.6 {RATIO} (ref 1.1–2.2)
ALP SERPL-CCNC: 99 U/L (ref 45–117)
ALT SERPL-CCNC: <6 U/L (ref 12–78)
ANION GAP BLD CALC-SCNC: 7 MMOL/L (ref 5–15)
APTT PPP: 28.1 SEC (ref 22.1–32.5)
AST SERPL W P-5'-P-CCNC: 11 U/L (ref 15–37)
BILIRUB SERPL-MCNC: 0.5 MG/DL (ref 0.2–1)
BUN SERPL-MCNC: 40 MG/DL (ref 6–20)
BUN/CREAT SERPL: 20 (ref 12–20)
CALCIUM SERPL-MCNC: 9.2 MG/DL (ref 8.5–10.1)
CHLORIDE SERPL-SCNC: 97 MMOL/L (ref 97–108)
CO2 SERPL-SCNC: 29 MMOL/L (ref 21–32)
CREAT SERPL-MCNC: 1.96 MG/DL (ref 0.7–1.3)
ERYTHROCYTE [DISTWIDTH] IN BLOOD BY AUTOMATED COUNT: 16.4 % (ref 11.5–14.5)
GLOBULIN SER CALC-MCNC: 4.3 G/DL (ref 2–4)
GLUCOSE BLD STRIP.AUTO-MCNC: 107 MG/DL (ref 65–100)
GLUCOSE BLD STRIP.AUTO-MCNC: 113 MG/DL (ref 65–100)
GLUCOSE BLD STRIP.AUTO-MCNC: 80 MG/DL (ref 65–100)
GLUCOSE BLD STRIP.AUTO-MCNC: 91 MG/DL (ref 65–100)
GLUCOSE SERPL-MCNC: 168 MG/DL (ref 65–100)
HCT VFR BLD AUTO: 31.8 % (ref 36.6–50.3)
HGB BLD-MCNC: 10 G/DL (ref 12.1–17)
MAGNESIUM SERPL-MCNC: 1.8 MG/DL (ref 1.6–2.4)
MCH RBC QN AUTO: 24.2 PG (ref 26–34)
MCHC RBC AUTO-ENTMCNC: 31.4 G/DL (ref 30–36.5)
MCV RBC AUTO: 76.8 FL (ref 80–99)
PLATELET # BLD AUTO: 113 K/UL (ref 150–400)
POTASSIUM SERPL-SCNC: 4.2 MMOL/L (ref 3.5–5.1)
PROT SERPL-MCNC: 6.9 G/DL (ref 6.4–8.2)
RBC # BLD AUTO: 4.14 M/UL (ref 4.1–5.7)
SERVICE CMNT-IMP: ABNORMAL
SERVICE CMNT-IMP: ABNORMAL
SERVICE CMNT-IMP: NORMAL
SERVICE CMNT-IMP: NORMAL
SODIUM SERPL-SCNC: 133 MMOL/L (ref 136–145)
THERAPEUTIC RANGE,PTTT: NORMAL SECS (ref 58–77)
WBC # BLD AUTO: 8.7 K/UL (ref 4.1–11.1)

## 2017-02-02 PROCEDURE — 97116 GAIT TRAINING THERAPY: CPT

## 2017-02-02 PROCEDURE — 82962 GLUCOSE BLOOD TEST: CPT

## 2017-02-02 PROCEDURE — 74011250637 HC RX REV CODE- 250/637: Performed by: NURSE PRACTITIONER

## 2017-02-02 PROCEDURE — 85027 COMPLETE CBC AUTOMATED: CPT | Performed by: PHYSICIAN ASSISTANT

## 2017-02-02 PROCEDURE — 65660000000 HC RM CCU STEPDOWN

## 2017-02-02 PROCEDURE — 83735 ASSAY OF MAGNESIUM: CPT | Performed by: PHYSICIAN ASSISTANT

## 2017-02-02 PROCEDURE — 74011000258 HC RX REV CODE- 258: Performed by: INTERNAL MEDICINE

## 2017-02-02 PROCEDURE — 36415 COLL VENOUS BLD VENIPUNCTURE: CPT | Performed by: PHYSICIAN ASSISTANT

## 2017-02-02 PROCEDURE — 85730 THROMBOPLASTIN TIME PARTIAL: CPT | Performed by: PHYSICIAN ASSISTANT

## 2017-02-02 PROCEDURE — 74011250637 HC RX REV CODE- 250/637: Performed by: INTERNAL MEDICINE

## 2017-02-02 PROCEDURE — 80053 COMPREHEN METABOLIC PANEL: CPT | Performed by: PHYSICIAN ASSISTANT

## 2017-02-02 PROCEDURE — 74011250636 HC RX REV CODE- 250/636: Performed by: INTERNAL MEDICINE

## 2017-02-02 PROCEDURE — 74011250637 HC RX REV CODE- 250/637: Performed by: PHYSICIAN ASSISTANT

## 2017-02-02 PROCEDURE — 74011636637 HC RX REV CODE- 636/637: Performed by: PHYSICIAN ASSISTANT

## 2017-02-02 RX ORDER — IBUPROFEN 200 MG
1 TABLET ORAL DAILY
Status: DISCONTINUED | OUTPATIENT
Start: 2017-02-02 | End: 2017-02-12

## 2017-02-02 RX ORDER — CEPHALEXIN 250 MG/1
250 CAPSULE ORAL 4 TIMES DAILY
Status: DISCONTINUED | OUTPATIENT
Start: 2017-02-03 | End: 2017-02-04

## 2017-02-02 RX ADMIN — Medication 10 ML: at 13:20

## 2017-02-02 RX ADMIN — INSULIN GLARGINE 10 UNITS: 100 INJECTION, SOLUTION SUBCUTANEOUS at 08:59

## 2017-02-02 RX ADMIN — GABAPENTIN 100 MG: 100 CAPSULE ORAL at 16:53

## 2017-02-02 RX ADMIN — GABAPENTIN 100 MG: 100 CAPSULE ORAL at 08:59

## 2017-02-02 RX ADMIN — OXYCODONE HYDROCHLORIDE AND ACETAMINOPHEN 2 TABLET: 5; 325 TABLET ORAL at 04:34

## 2017-02-02 RX ADMIN — BUMETANIDE 2 MG: 1 TABLET ORAL at 21:08

## 2017-02-02 RX ADMIN — OXYCODONE HYDROCHLORIDE AND ACETAMINOPHEN 2 TABLET: 5; 325 TABLET ORAL at 16:55

## 2017-02-02 RX ADMIN — OXYCODONE HYDROCHLORIDE AND ACETAMINOPHEN 2 TABLET: 5; 325 TABLET ORAL at 11:25

## 2017-02-02 RX ADMIN — Medication 10 ML: at 06:00

## 2017-02-02 RX ADMIN — OXYCODONE HYDROCHLORIDE AND ACETAMINOPHEN 2 TABLET: 5; 325 TABLET ORAL at 21:17

## 2017-02-02 RX ADMIN — AMIODARONE HYDROCHLORIDE 200 MG: 200 TABLET ORAL at 21:09

## 2017-02-02 RX ADMIN — CARVEDILOL 3.12 MG: 3.12 TABLET, FILM COATED ORAL at 08:59

## 2017-02-02 RX ADMIN — BUMETANIDE 2 MG: 1 TABLET ORAL at 08:59

## 2017-02-02 RX ADMIN — BUMETANIDE 2 MG: 1 TABLET ORAL at 16:53

## 2017-02-02 RX ADMIN — MILRINONE LACTATE 0.3 MCG/KG/MIN: 200 INJECTION, SOLUTION INTRAVENOUS at 17:43

## 2017-02-02 RX ADMIN — GLIMEPIRIDE 4 MG: 2 TABLET ORAL at 07:08

## 2017-02-02 RX ADMIN — CEFAZOLIN SODIUM 1 G: 1 INJECTION, POWDER, FOR SOLUTION INTRAMUSCULAR; INTRAVENOUS at 22:28

## 2017-02-02 RX ADMIN — MILRINONE LACTATE 0.3 MCG/KG/MIN: 200 INJECTION, SOLUTION INTRAVENOUS at 04:13

## 2017-02-02 RX ADMIN — CARVEDILOL 3.12 MG: 3.12 TABLET, FILM COATED ORAL at 16:53

## 2017-02-02 RX ADMIN — AMIODARONE HYDROCHLORIDE 200 MG: 200 TABLET ORAL at 08:59

## 2017-02-02 RX ADMIN — Medication 10 ML: at 21:09

## 2017-02-02 RX ADMIN — CEFAZOLIN SODIUM 1 G: 1 INJECTION, POWDER, FOR SOLUTION INTRAMUSCULAR; INTRAVENOUS at 07:06

## 2017-02-02 RX ADMIN — Medication 10 ML: at 13:24

## 2017-02-02 RX ADMIN — CEFAZOLIN SODIUM 1 G: 1 INJECTION, POWDER, FOR SOLUTION INTRAMUSCULAR; INTRAVENOUS at 15:07

## 2017-02-02 RX ADMIN — ATORVASTATIN CALCIUM 10 MG: 10 TABLET, FILM COATED ORAL at 08:59

## 2017-02-02 RX ADMIN — Medication 81 MG: at 08:59

## 2017-02-02 RX ADMIN — Medication 10 ML: at 13:25

## 2017-02-02 RX ADMIN — Medication 400 MG: at 08:59

## 2017-02-02 NOTE — PROGRESS NOTES
[facility] Progress Note      NAME:  Fernanda Rose. :   1950   MRN:   [id number]   PCP:  None  CARD:   Dr. Main Mccann    Date:  2017     Fernanda Rose. is a 61 y.o. male with a who presented for further evaluation of severe CAD and systolic heart failure. Subjective:   He was initially seen at [facility] 3 years ago and told that he had heart failure with LVEF 30%. He was lost to follow up due to lack of insurance and has not been seen by a physician in the interim. He complains of progressive fatigue, NAVARRO, PND, and edema over the past year, prompting presentation to [facility]. Currently he is resting comfortably without complaints. His legs are a little better today. He is worried about making a decision about what to do. Past 24 hours:  Denies CP or SOB  Remains on milrinone gtt    Objective:     Visit Vitals    /64 (BP 1 Location: Right arm, BP Patient Position: Sitting)    Pulse 84    Temp 98 °F (36.7 °C)    Resp 18    Ht 5' 9\" (1.753 m)    Wt 188 lb 11.4 oz (85.6 kg)    SpO2 97%    BMI 27.87 kg/m2        Physical Exam:    Gen:  WA, WN, NAD  HEENT:  EOMI  Neck:  (+) JVD  CVS:  S1/S2,  +S3  Pul:  Decreased L base, R side clear  Abd:  Soft, NT/ND  Ext:  1-2+ b/l LE edema, multiple bandages in place, +erythema  Neuro:  No obvious deficits    1901 -  0700  In: 1974.8 [P.O.:1324; I.V.:650.8]  Out: 3475 [Urine:3475]  O2 Flow Rate (L/min): 1 l/min O2 Device: Room air  Temp (24hrs), Av.2 °F (36.8 °C), Min:97.4 °F (36.3 °C), Max:98.7 °F (37.1 °C)          Care Plan discussed with:    Comments   Patient x    Family      RN x    Care Manager                    Consultant:          Past History:     Past Medical History   Diagnosis Date    Cardiomyopathy ([facility] [address] 2017     A. Echo (17):  EF 5-10% with severe GHK,. Mildly dil LA. Mild TR. PASP 46. No past surgical history on file.     Social History   Substance Use Topics    Smoking status: Not on file    Smokeless tobacco: Not on file    Alcohol use Not on file        No family history on file. Allergies:   No Known Allergies       Data Review:     CXR:   CXR Results  (Last 48 hours)    None            Echocardiogram:   Echo Results  (Last 48 hours)    None          No results found for this visit on 01/20/17. ECG:  EKG: ST with occasional PVC, NSIVCD, LAE    LABS:  Recent Results (from the past 24 hour(s))   GLUCOSE, POC    Collection Time: 02/01/17  4:40 PM   Result Value Ref Range    Glucose (POC) 93 65 - 100 mg/dL    Performed by Unkown     GLUCOSE, POC    Collection Time: 02/01/17  9:40 PM   Result Value Ref Range    Glucose (POC) 90 65 - 100 mg/dL    Performed by Unkown     METABOLIC PANEL, COMPREHENSIVE    Collection Time: 02/02/17  4:29 AM   Result Value Ref Range    Sodium 133 (L) 136 - 145 mmol/L    Potassium 4.2 3.5 - 5.1 mmol/L    Chloride 97 97 - 108 mmol/L    CO2 29 21 - 32 mmol/L    Anion gap 7 5 - 15 mmol/L    Glucose 168 (H) 65 - 100 mg/dL    BUN 40 (H) 6 - 20 MG/DL    Creatinine 1.96 (H) 0.70 - 1.30 MG/DL    BUN/Creatinine ratio 20 12 - 20      GFR est AA 42 (L) >60 ml/min/1.73m2    GFR est non-AA 35 (L) >60 ml/min/1.73m2    Calcium 9.2 8.5 - 10.1 MG/DL    Bilirubin, total 0.5 0.2 - 1.0 MG/DL    ALT (SGPT) <6 (L) 12 - 78 U/L    AST (SGOT) 11 (L) 15 - 37 U/L    Alk.  phosphatase 99 45 - 117 U/L    Protein, total 6.9 6.4 - 8.2 g/dL    Albumin 2.6 (L) 3.5 - 5.0 g/dL    Globulin 4.3 (H) 2.0 - 4.0 g/dL    A-G Ratio 0.6 (L) 1.1 - 2.2     MAGNESIUM    Collection Time: 02/02/17  4:29 AM   Result Value Ref Range    Magnesium 1.8 1.6 - 2.4 mg/dL   CBC W/O DIFF    Collection Time: 02/02/17  4:29 AM   Result Value Ref Range    WBC 8.7 4.1 - 11.1 K/uL    RBC 4.14 4.10 - 5.70 M/uL    HGB 10.0 (L) 12.1 - 17.0 g/dL    HCT 31.8 (L) 36.6 - 50.3 %    MCV 76.8 (L) 80.0 - 99.0 FL    MCH 24.2 (L) 26.0 - 34.0 PG    MCHC 31.4 30.0 - 36.5 g/dL    RDW 16.4 (H) 11.5 - 14.5 %    PLATELET 331 (L) 205 - 400 K/uL   PTT    Collection Time: 02/02/17  4:29 AM   Result Value Ref Range    aPTT 28.1 22.1 - 32.5 sec    aPTT, therapeutic range     58.0 - 77.0 SECS   GLUCOSE, POC    Collection Time: 02/02/17  7:04 AM   Result Value Ref Range    Glucose (POC) 113 (H) 65 - 100 mg/dL    Performed by Gillian Torres    GLUCOSE, POC    Collection Time: 02/02/17 11:41 AM   Result Value Ref Range    Glucose (POC) 91 65 - 100 mg/dL    Performed by Zabrina Lepe      All Micro Results     Procedure Component Value Units Date/Time    CULTURE, BODY FLUID Quillian Chill STAIN [[id number]] Collected:  01/26/17 1505    Order Status:  Completed Specimen:  Thoracentesis Updated:  01/30/17 1057     Special Requests: NO SPECIAL REQUESTS        GRAM STAIN OCCASIONAL  WBCS SEEN         NO ORGANISMS SEEN        Culture result: NO GROWTH 4 DAYS       CULTURE, Thomrox Reidck STAIN [[id number]] Collected:  01/25/17 2134    Order Status:  Completed Specimen:  Leg Updated:  01/27/17 1440     Special Requests: NO SPECIAL REQUESTS        GRAM STAIN RARE  WBCS SEEN         NO ORGANISMS SEEN        Culture result: LIGHT  STREPTOCOCCI, BETA HEMOLYTIC GROUP C  . .. Penicillin and ampicillin are drugs of choice for treatment of beta-hemolytic streptococcal infections. Susceptibility testing of penicillins and beta-lactams approved by the FDA for treatment of beta-hemolytic streptococcal infections need not be performed routinely, because nonsusceptible isolates are extremely rare. CLSI 2012         MODERATE  BETTIE TROPICALIS             Specimen Source   1: Pleural Fluid   CYTOLOGIC INTERPRETATION:   Scattered mesothelial cells with degenerative changes and mixed inflammatory cells   General Categorization   No cells diagnostic for malignancy   Specimen Adequacy   Satisfactory for evaluation        IMPRESSION  1. Markedly dilated left ventricle with 3-D end-diastolic volume index to body  surface area of 153 mL/sq m.  Mild eccentric left ventricular hypertrophy with  3-D mass index to body surface area of 85 g/sq m. Severe left ventricular  systolic dysfunction. Severe global hypokinesis with regional variation. 3-D  LVEF 10%. 2. Moderately dilated right ventricle by 3-D volumetric assessment. RV end  diastolic volume indexed to body surface area of 138 mL/sq m. Moderate to severe  right ventricular systolic dysfunction. Global hypokinesis. 3-D RVEF 25%. 3. Apical a tethered mitral valve leaflets. Mild mitral regurgitation. 4. Moderately severe 3+ tricuspid regurgitation. 5. On LGE study, the anterior wall, anteroseptal wall, anteroapical wall, and  anterior lateral wall are largely viable without significant myocardial  infarction. The entire inferior wall and inferoseptal wall are completely viable  without any infarct. The base to mid inferolateral wall demonstrate a near  transmural greater than 75% thickness infarct with minimal or no viable  myocardium in this territory. The distal inferolateral wall, apical lateral wall  does not demonstrate any infarct and are largely viable. Based on the viability  imaging, the entire LAD territory is largely viable and should recover upon  revascularization. The entire RCA territory is largely viable and should recover  upon revascularization. The LCx territory demonstrate medium-size infarct with  limited viability. 6. Large left-sided pleural effusion with passive atelectasis of the left lung. 7. Dilated branch pulmonary arteries suggest pulmonary hypertension. 8. Markedly dilated left atrium measuring 59 x 55 mm. Moderately dilated right  atrium measuring 46 x 56 mm.     Medications reviewed:    Current Facility-Administered Medications   Medication Dose Route Frequency    nicotine (NICODERM CQ) 21 mg/24 hr patch 1 Patch  1 Patch TransDERmal DAILY    glimepiride (AMARYL) tablet 4 mg  4 mg Oral ACB    insulin glargine (LANTUS) injection 10 Units  10 Units SubCUTAneous DAILY    gabapentin (NEURONTIN) capsule 100 mg  100 mg Oral BID    bumetanide (BUMEX) tablet 2 mg  2 mg Oral TID    milrinone (PRIMACOR) 20 MG/100 ML D5W infusion  0.3 mcg/kg/min IntraVENous CONTINUOUS    carvedilol (COREG) tablet 3.125 mg  3.125 mg Oral BID WITH MEALS    amiodarone (CORDARONE) tablet 200 mg  200 mg Oral Q12H    bacitracin 500 unit/gram packet 1 Packet  1 Packet Topical PRN    atorvastatin (LIPITOR) tablet 10 mg  10 mg Oral DAILY    ceFAZolin (ANCEF) 1 g in 0.9% sodium chloride (MBP/ADV) 50 mL  1 g IntraVENous Q8H    0.9% sodium chloride infusion  10 mL/hr IntraVENous CONTINUOUS    acetaminophen (TYLENOL) tablet 650 mg  650 mg Oral Q6H PRN    aspirin delayed-release tablet 81 mg  81 mg Oral DAILY    glucagon (GLUCAGEN) injection 1 mg  1 mg IntraMUSCular PRN    glucose chewable tablet 16 g  4 Tab Oral PRN    insulin lispro (HUMALOG) injection   SubCUTAneous AC&HS    magnesium oxide (MAG-OX) tablet 400 mg  400 mg Oral DAILY    0.9% sodium chloride infusion  3 mL/hr IntraVENous CONTINUOUS    sodium chloride (NS) flush 5-10 mL  5-10 mL IntraVENous Q8H    sodium chloride (NS) flush 5-10 mL  5-10 mL IntraVENous PRN    albuterol (PROVENTIL VENTOLIN) nebulizer solution 2.5 mg  2.5 mg Nebulization Q4H PRN    diphenhydrAMINE (BENADRYL) capsule 25 mg  25 mg Oral QHS PRN    oxyCODONE-acetaminophen (PERCOCET) 5-325 mg per tablet 2 Tab  2 Tab Oral Q4H PRN    ELECTROLYTE REPLACEMENT PROTOCOL  1 Each Other PRN     PFTs  FVC 2.62 60% predicted             FEV1 1.51 50% predicted             FEV1/FVC 54.57      IMPRESSION:   1. Acute on chronic systolic heart failure (ICM) - Stage D, NYHA Class IV  2. Severe native coronary artery disease w/ viability  3. Heparin induced antibody +  4. Diabetes Mellitus, A1C of 13.5  5. History of tobacco abuse  6. Cellulitis  7. CHACORTA on CKD3  8. Pulmonary HTN  9. Persistent atrial fibrillation  10. Hyponatremia  11. Left pleural effusion  12. Anemia       PLAN:   1.  Continue milrinone at 0.3 mcg/kg/min, Continue low dose Coreg and Bumex, Strict I/Os, Replete electrolytes prn, Hold ACE-I due to CHACORTA on CKD. Daily weights  2. Continue ASA, statin, low dose BB; no ACEi d/t renal dysfunction,  too high risk for CABG d/t low LVEF and diabetes out of control - considering high risk PCI it would be performed next week. 3. Hep antibody positive - REJI pending. 4. DTC recommendation for BS managament  5. IV cefazolin 1 gram q 8 hours per ID  6. Smoking cessation counseling, nicotine patch  7. B/L foot pumps for DVT prophylaxis - pt will not tolerate SCDs d/t bullae on lower legs, f/u REJI results  8. Trend H/H has remained stable  9.  Monitor renal function       Pt seen with Dr. Katya Martin and Dr. Parmjit Cisse    Thank you for letting me see him with you,    GREGORY Davis    Saw patient, agree with above  Risk of morbidity and mortality - high  Medical decision making - high complexity

## 2017-02-02 NOTE — PROGRESS NOTES
Palliative Medicine Consult  Harjeet: [phone] ([phone])    Patient Name: Mary Gan. YOB: 1950    Date of Initial Consult: 1/31/17  Reason for Consult: Advanced heart failure  Requesting Provider: Steven Pate  Primary Care Physician: None      SUMMARY:   Mary Gan. \"Edwar\"  is a 61y.o. year old with a past history of heart failure dx 3 years ago at [facility] who was lost to follow up due to lack of insurance who was admitted on 1/17/16  to [facility] with SOB and fatigue. Found to have severe ischemic cardiomyopathy with severe 3 vessel disease on cath and EF 5-10%, uncontrolled DM (A1c 13.5), LE cellulitis. Pt tx to [facility] on 1/20/17 for surgical evaluation. Medical management has yet to improve pt's EF, high risk of mortality with surgery. On Milrinone. Cardiac viability study 1/31 showing viable areas in LAD and RCA territories, LCx w/ limited viability. Deciding on PCI. Current medical issues leading to Palliative Medicine involvement include: care decisions given severe ICM and other comorbidities. PALLIATIVE DIAGNOSES:   1. Shortness of breath  2. Edema b/l LE w/ pain   3. Fatigue        PLAN:   1. Talk w/ pt along w/ Yecenia Roger LCSW. Feeling less hopeful than he did yesterday. Spent time talking about the procedure and AMD w/ his wife yest and she was very overwhelmed and did not want to be involved- her way of dealing w/ events is not talking about them, he says. 2. Pt is DNR and signed the DDNR. Does not want resuscitation in the event of a cardiopulmonary arrest. We talked about how code status is reversed during any cardiac procedure or surgery and he is okay w/ this. He is clear that he does not want to live prolonged on machines- but does not want to complete AMD because his wife does not want to be part of it. 3. Pt still wishes to cont all measures for recovery as possible.  See Blaire's note for more info regarding family dynamics, lack of support emotionally perhaps. 4. Will cont to follow for support. 5. Plan: Cont all measures and interventions that lead to good function; DNR (but reverse during PCI)l support. 6. Initial consult note routed to primary continuity provider  7. Communicated plan of care with: Palliative IDT; Lily THOMAS; Dr Dmitriy Edwards; Ying Sahu; Al JENKINS       GOALS OF CARE / TREATMENT PREFERENCES:   [====Goals of Care====]  GOALS OF CARE:  Patient / health care proxy stated goals: recovery as possible      TREATMENT PREFERENCES:   Code Status: Full Code    Advance Care Planning:  Advance Care Planning 2/2/2017   Patient's Healthcare Decision Maker is: Legal Next of Onur Ruiz   Primary Decision Maker Name Nasreen Plascencia   Primary Decision Maker Phone Number [phone]   Primary Decision Maker Relationship to Patient -   Confirm Advance Directive None   Patient Would Like to Complete Advance Directive No   Does the patient have other document types Do Not Resuscitate       Other:    The palliative care team has discussed with patient / health care proxy about goals of care / treatment preferences for patient.  [====Goals of Care====]         HISTORY:       HPI/SUBJECTIVE:    The patient is:   [x] Verbal and participatory  [] Non-participatory due to:     Pt feeling a little sad today, but remains hopeful about all procedures and is doing all he can to get better. Walking the hallways w/ PT.     Clinical Pain Assessment (nonverbal scale for severity on nonverbal patients):   [++++ Clinical Pain Assessment++++]  [++++Pain Severity++++]: Pain: 2  [++++Pain Character++++]: aching   [++++Pain Duration++++]: several weeks  [++++Pain Effect++++]: Decr function   [++++Pain Factors++++]: edema from HF  [++++Pain Frequency++++]: constant   [++++Pain Location++++]: b/l LE  [++++ Clinical Pain Assessment++++]     FUNCTIONAL ASSESSMENT:     Palliative Performance Scale (PPS):  PPS: 60       PSYCHOSOCIAL/SPIRITUAL SCREENING:     Advance Care Planning:  Advance Care Planning 2/2/2017   Patient's Healthcare Decision Maker is: Legal Next of Onur Ruiz   Primary Decision Maker Name Queen Sulema   Primary Decision Maker Phone Number [phone]   Primary Decision Maker Relationship to Patient -   Confirm Advance Directive None   Patient Would Like to Complete Advance Directive No   Does the patient have other document types Do Not Resuscitate        Any spiritual / Mormonism concerns:  [] Yes /  [x] No    Caregiver Burnout:  [] Yes /  [x] No /  [] No Caregiver Present      Anticipatory grief assessment:   [x] Normal  / [] Maladaptive       ESAS Anxiety: Anxiety: 0    ESAS Depression: Depression: 0        REVIEW OF SYSTEMS:     Positive and pertinent negative findings in ROS are noted above in HPI. The following systems were [x] reviewed / [] unable to be reviewed as noted in HPI  Other findings are noted below. Systems: constitutional, ears/nose/mouth/throat, respiratory, gastrointestinal, genitourinary, musculoskeletal, integumentary, neurologic, psychiatric, endocrine. Positive findings noted below. Modified ESAS Completed by: provider   Fatigue: 3 Drowsiness: 0   Depression: 0 Pain: 2   Anxiety: 0 Nausea: 0   Anorexia: 0 Dyspnea: 2     Constipation: No     Stool Occurrence(s): 1        PHYSICAL EXAM:     From RN flowsheet:  Wt Readings from Last 3 Encounters:   02/02/17 188 lb 11.4 oz (85.6 kg)   01/20/17 178 lb 9.2 oz (81 kg)     Blood pressure 105/71, pulse 89, temperature 98.2 °F (36.8 °C), resp. rate 18, height 5' 9\" (1.753 m), weight 188 lb 11.4 oz (85.6 kg), SpO2 98 %.     Pain Scale 1: Numeric (0 - 10)  Pain Intensity 1: 2  Pain Onset 1: chronic  Pain Location 1: Ankle, Foot  Pain Orientation 1: Left, Right  Pain Description 1: Constant, Throbbing  Pain Intervention(s) 1: Medication (see MAR)    Constitutional: awake, alert, engaging   Eyes: pupils equal, anicteric  ENMT: no nasal discharge, moist mucous membranes  Respiratory: breathing not labored at rest  Musculoskeletal: no deformity  Skin: warm, dry  Ext: ++ edema b/l LE w/ some erythema   Neurologic: following commands, moving all extremities  Psychiatric: full affect, no hallucinations         HISTORY:     Active Problems:    Systolic heart failure ([facility] [address] (1/20/2017)      Past Medical History   Diagnosis Date    Cardiomyopathy ([facility] [address] 1/20/2017     A. Echo (1/19/17):  EF 5-10% with severe GHK,. Mildly dil LA. Mild TR. PASP 46. No past surgical history on file. No family history on file. History reviewed, no pertinent family history.   Social History   Substance Use Topics    Smoking status: Not on file    Smokeless tobacco: Not on file    Alcohol use Not on file     No Known Allergies   Current Facility-Administered Medications   Medication Dose Route Frequency    glimepiride (AMARYL) tablet 4 mg  4 mg Oral ACB    insulin glargine (LANTUS) injection 10 Units  10 Units SubCUTAneous DAILY    gabapentin (NEURONTIN) capsule 100 mg  100 mg Oral BID    bumetanide (BUMEX) tablet 2 mg  2 mg Oral TID    milrinone (PRIMACOR) 20 MG/100 ML D5W infusion  0.3 mcg/kg/min IntraVENous CONTINUOUS    carvedilol (COREG) tablet 3.125 mg  3.125 mg Oral BID WITH MEALS    amiodarone (CORDARONE) tablet 200 mg  200 mg Oral Q12H    bacitracin 500 unit/gram packet 1 Packet  1 Packet Topical PRN    atorvastatin (LIPITOR) tablet 10 mg  10 mg Oral DAILY    ceFAZolin (ANCEF) 1 g in 0.9% sodium chloride (MBP/ADV) 50 mL  1 g IntraVENous Q8H    0.9% sodium chloride infusion  10 mL/hr IntraVENous CONTINUOUS    acetaminophen (TYLENOL) tablet 650 mg  650 mg Oral Q6H PRN    aspirin delayed-release tablet 81 mg  81 mg Oral DAILY    glucagon (GLUCAGEN) injection 1 mg  1 mg IntraMUSCular PRN    glucose chewable tablet 16 g  4 Tab Oral PRN    insulin lispro (HUMALOG) injection   SubCUTAneous AC&HS    magnesium oxide (MAG-OX) tablet 400 mg  400 mg Oral DAILY    0.9% sodium chloride infusion  3 mL/hr IntraVENous CONTINUOUS    sodium chloride (NS) flush 5-10 mL  5-10 mL IntraVENous Q8H    sodium chloride (NS) flush 5-10 mL  5-10 mL IntraVENous PRN    albuterol (PROVENTIL VENTOLIN) nebulizer solution 2.5 mg  2.5 mg Nebulization Q4H PRN    diphenhydrAMINE (BENADRYL) capsule 25 mg  25 mg Oral QHS PRN    oxyCODONE-acetaminophen (PERCOCET) 5-325 mg per tablet 2 Tab  2 Tab Oral Q4H PRN    ELECTROLYTE REPLACEMENT PROTOCOL  1 Each Other PRN          LAB AND IMAGING FINDINGS:     Lab Results   Component Value Date/Time    WBC 8.7 02/02/2017 04:29 AM    HGB 10.0 02/02/2017 04:29 AM    PLATELET 964 15/78/3069 04:29 AM     Lab Results   Component Value Date/Time    Sodium 133 02/02/2017 04:29 AM    Potassium 4.2 02/02/2017 04:29 AM    Chloride 97 02/02/2017 04:29 AM    CO2 29 02/02/2017 04:29 AM    BUN 40 02/02/2017 04:29 AM    Creatinine 1.96 02/02/2017 04:29 AM    Calcium 9.2 02/02/2017 04:29 AM    Magnesium 1.8 02/02/2017 04:29 AM    Phosphorus 2.5 01/23/2017 12:09 PM      Lab Results   Component Value Date/Time    AST (SGOT) 11 02/02/2017 04:29 AM    Alk. phosphatase 99 02/02/2017 04:29 AM    Protein, total 6.9 02/02/2017 04:29 AM    Albumin 2.6 02/02/2017 04:29 AM    Globulin 4.3 02/02/2017 04:29 AM     Lab Results   Component Value Date/Time    INR 1.3 01/23/2017 12:09 PM    Prothrombin time 13.5 01/23/2017 12:09 PM    aPTT 28.1 02/02/2017 04:29 AM      No results found for: IRON, FE, TIBC, IBCT, PSAT, FERR   No results found for: PH, PCO2, PO2  No components found for: [place]   Lab Results   Component Value Date/Time    CK 51 01/18/2017 11:11 PM    CK - MB 2.9 01/18/2017 11:11 PM                Total time: 35 min   Counseling / coordination time: 25 min   > 50% counseling / coordination?: yes    Prolonged service was provided for  []30 min   []75 min in face to face time in the presence of the patient. Time Start:   Time End:   Note: this can only be billed with 09599 (initial) or 71730 (follow up).   If multiple start / stop times, list each separately.

## 2017-02-02 NOTE — PROGRESS NOTES
Infectious Diseases Progress Note    Antibiotic Summary:  Levaquin  --   Vancomycin  --   Ancef    -- present      Subjective:     No new complaints    Objective:     Vitals:   Visit Vitals    /71 (BP 1 Location: Right arm, BP Patient Position: At rest)    Pulse 91    Temp 97.4 °F (36.3 °C)    Resp 18    Ht 5' 9\" (1.753 m)    Wt 85.9 kg (189 lb 6 oz)    SpO2 97%    BMI 27.97 kg/m2        Tmax:  Temp (24hrs), Av.5 °F (36.4 °C), Min:96.6 °F (35.9 °C), Max:98.4 °F (36.9 °C)      Exam:  General appearance: alert, no distress  Lungs: few basilar rales  Heart: RRR  Abdomen: nontender  Left leg: erythema improving; no ulcers  Right leg: erythema better; superficial ulcers on the right leg are drying up. IV Lines: Left PICC inserted     Labs:    Recent Labs      17   0527  17   0452  17   0416   WBC  5.8  5.4  5.7   HGB  10.5*  10.0*  10.0*   PLT  99*  86*  111*   BUN  38*  42*  40*   CREA  1.84*  1.98*  1.94*   TBILI  0.4  0.4  0.5   SGOT  13*  12*  14*   AP  89  86  85     Culture right leg ulcers  = group C Streptococcus    Assessment:     1. Bilateral venous stasis disease of the LEs +/- cellulitis: Improving     2. OHD -- IHD/CAD; CHF; pulm HTN     3. CRF with acute exacerbation     4. NIDDM -- new diagnosis     5. Probable COPD -- heavy smoking since age 7      10. Anemia -- HC/MC -- positive family history of colon cancer -- may need GI W/U    Plan:     1.  Continue Ancef -- will plan to change to po Keflex soon      Esme Neil MD

## 2017-02-02 NOTE — ACP (ADVANCE CARE PLANNING)
Dr. Liz Holley and I met with patient this morning. He was alert and oriented. He states that he and his wife, Hilda Lopez, reviewed AMD we left for him. He states she was very uncomfortable in assuming the role of mPOA. He states she does not want to be responsible for any decisions that may cause or lead to his death. Patient has made a decision for DNR and signed DDNR. Talked more about the importance of naming mPOA if he does not want his wife in that position, as she is his NOK and would legally assume that role if he loses capacity. Patient states he does not trust anyone with that role. He plans to make his own decisions and hopes that the DNR will be sufficient to communicate his wishes if he is not able to direct his care. He states he does not want to have life prolonged in a vegetative state. DDNR placed in chart. ACP tab updated to reflect changes.

## 2017-02-02 NOTE — PROGRESS NOTES
Problem: Mobility Impaired (Adult and Pediatric)  Goal: *Acute Goals and Plan of Care (Insert Text)  Physical Therapy Goals  1/30/2017-Goals re-evaluated on 1/30/2017      1. Patient will ambulate with modified independence for 300 feet with the least restrictive device within 7 day(s). 2. Patient will ascend/descend 4 stairs with 0 handrail(s) with modified independence within 7 day(s). Initiated 1/21/2017  1. Patient will move from supine to sit and sit to supine in bed with modified independence within 7 day(s). -MET  2. Patient will transfer from bed to chair and chair to bed with modified independence using the least restrictive device within 7 day(s). -MET  3. Patient will perform sit to stand with modified independence within 7 day(s). -MET  4. Patient will ambulate with modified independence for 200 feet with the least restrictive device within 7 day(s). -MET  5. Patient will ascend/descend 4 stairs with 0 handrail(s) with modified independence within 7 day(s). -continue, LOB with stairs on 1/30/2017     PHYSICAL THERAPY TREATMENT WITH 6MWT RESULTS     Patient: Martin Spence (64 y.o. male)  Date: 2/2/2017  Diagnosis: CAD  Systolic heart failure ([facility]) <principal problem not specified>       Precautions:    Chart, physical therapy assessment, plan of care and goals were reviewed. ASSESSMENT:  Pt reports mobilizing in the hallway last night. Pt is mobilizing at a independent to supervision level. Pt slightly unsteady but not overt LOB. Pt was able to complete 6MWT 583feet. Pt did reports calf and hamstring \"burning\" with gait. Will continue to progess as able .  Pt can ambualate with nursing   Progression toward goals:  [X]      Improving appropriately and progressing toward goals  [ ]      Improving slowly and progressing toward goals  [ ]      Not making progress toward goals and plan of care will be adjusted       PLAN:  Patient continues to benefit from skilled intervention to address the above impairments. Continue treatment per established plan of care. Discharge Recommendations:  Outpatient and None  Further Equipment Recommendations for Discharge:  none       SUBJECTIVE:   Patient stated I am ready .       OBJECTIVE DATA SUMMARY:   Critical Behavior:  Neurologic State: Alert, Appropriate for age  Orientation Level: Oriented X4  Cognition: Appropriate for age attention/concentration, Appropriate safety awareness, Appropriate decision making  Safety/Judgement: Awareness of environment  Functional Mobility Training:  Bed Mobility:     Supine to Sit: Modified independent                          Transfers:  Sit to Stand: Modified independent  Stand to Sit: Modified independent                             Balance:  Sitting: Intact  Standing: Intact  Ambulation/Gait Training:  Distance (ft): 583 Feet (ft)  Assistive Device: Gait belt  Ambulation - Level of Assistance: Independent;Supervision        Gait Abnormalities: Decreased step clearance        Base of Support: Narrowed     Speed/Veronica: Pace decreased (<100 feet/min); Slow                                  Stairs:              6 MWT results:  Distance Walked in Feet (ft): 583 ft. Pre O2 Saturation: 97% (on room air)           t       Post O2 Saturation: 95%  (on room air)     Assistive device used: Assistive Device: Gait belt            Normative data: 30-57 years old = 0 feet; Men 39-80 years old = 1889 feet; Women 3680 years ums=4895 feet  Modified 10 point Evans RPE scale utilized where 0 = no breathlessness at all; 10 = maximum exertion  Please refer to the flowsheet for any additional vital signs taken during this treatment.             Neuro Re-Education:     Therapeutic Exercises:      Pain:  Pain Scale 1: Numeric (0 - 10)  Pain Intensity 1: 5  Pain Location 1: Leg  Pain Orientation 1: Left;Right  Pain Description 1: Constant  Pain Intervention(s) 1: Medication (see MAR)  Activity Tolerance:   Limited   Please refer to the flowsheet for vital signs taken during this treatment.   After treatment:   [X] Patient left in no apparent distress sitting up in chair  [ ] Patient left in no apparent distress in bed  [X] Call bell left within reach  [X] Nursing notified  [ ] Caregiver present  [ ] Bed alarm activated      COMMUNICATION/COLLABORATION:   The patients plan of care was discussed with: Registered Nurse     Claire Quintana PTA   Time Calculation: 15 mins

## 2017-02-02 NOTE — PROGRESS NOTES
Problem: Falls - Risk of  Goal: *Absence of falls  Outcome: Progressing Towards Goal  Call bell within reach, personal belongings in reach, bed locked and in low position. Non skid footwear in placed. Goal: *Knowledge of fall prevention  Outcome: Progressing Towards Goal  Uses the call bell appropriately to call for assistance         Problem: Pressure Ulcer - Risk of  Goal: *Prevention of pressure ulcer  Outcome: Progressing Towards Goal  Q4H skin assessed, Turns self at appropriate intervals and blood glucose controlled         Bedside and Verbal shift change report given to Joie Dorman RN (oncoming nurse) by Paulo Grover RN (offgoing nurse). Report included the following information SBAR, Kardex, ED Summary, Procedure Summary, Intake/Output, MAR, Recent Results and Med Rec Status.

## 2017-02-02 NOTE — PROGRESS NOTES
[facility]   [address] [facility], [address] [place] [address], [facility]  Phone: [phone]   [phone]       Nephrology Progress Note  Martin Kilgore.     1950     [id number]  Date of Admission : 1/20/2017 02/02/17    CC: Follow up for CKD/ARF      Assessment and Plan   CHACORTA on CKD :  - CHACORTA likely 2/2 cardiorenal syndrome + IVVD from diuretics   - continue Bumex to 2 mg TID    CKD :  · Baseline Cr unknown   · Presumed to be 2/2 untreated DM, HTN and CMP at this time     Thrombocytopenia :  -  CECILIA Ab +ve. Ordered REJI  -  Other possibilities :  Ancef , hypersplenism from alcoholism    Peripheral Neuropathy :  On Gabapentin     Hyponatremia :  - combination of  CHF + SIADH from chronic alcoholism  - Na stable after 2 doses of Tolvaptan   - Diuresis w/ loops . AVOID THIAZIDES     Acute on Chronic Systolic CHF w/ severe CMP  - echo with EF 5-10%, severely dilated LV, severe TR  - Multivessel CAD : MRI showed viable myocardium   - on Milrinone  - High PCI planned      Cellulitis RLE w/ Pustular lesions : On Ancef per ID    Pulm HTN     Chronic smoker w/VENANCIO -PNA         Interval History:  He chose to proceed with high risk PCI  Reports legs feeling better   No cp, sob, n/v/d reported. Review of Systems: Pertinent items are noted in HPI.     Current Medications:   Current Facility-Administered Medications   Medication Dose Route Frequency    glimepiride (AMARYL) tablet 4 mg  4 mg Oral ACB    insulin glargine (LANTUS) injection 10 Units  10 Units SubCUTAneous DAILY    gabapentin (NEURONTIN) capsule 100 mg  100 mg Oral BID    bumetanide (BUMEX) tablet 2 mg  2 mg Oral TID    milrinone (PRIMACOR) 20 MG/100 ML D5W infusion  0.3 mcg/kg/min IntraVENous CONTINUOUS    carvedilol (COREG) tablet 3.125 mg  3.125 mg Oral BID WITH MEALS    amiodarone (CORDARONE) tablet 200 mg  200 mg Oral Q12H    bacitracin 500 unit/gram packet 1 Packet  1 Packet Topical PRN    atorvastatin (LIPITOR) tablet 10 mg  10 mg Oral DAILY    ceFAZolin (ANCEF) 1 g in 0.9% sodium chloride (MBP/ADV) 50 mL  1 g IntraVENous Q8H    0.9% sodium chloride infusion  10 mL/hr IntraVENous CONTINUOUS    acetaminophen (TYLENOL) tablet 650 mg  650 mg Oral Q6H PRN    aspirin delayed-release tablet 81 mg  81 mg Oral DAILY    glucagon (GLUCAGEN) injection 1 mg  1 mg IntraMUSCular PRN    glucose chewable tablet 16 g  4 Tab Oral PRN    insulin lispro (HUMALOG) injection   SubCUTAneous AC&HS    magnesium oxide (MAG-OX) tablet 400 mg  400 mg Oral DAILY    0.9% sodium chloride infusion  3 mL/hr IntraVENous CONTINUOUS    sodium chloride (NS) flush 5-10 mL  5-10 mL IntraVENous Q8H    sodium chloride (NS) flush 5-10 mL  5-10 mL IntraVENous PRN    albuterol (PROVENTIL VENTOLIN) nebulizer solution 2.5 mg  2.5 mg Nebulization Q4H PRN    diphenhydrAMINE (BENADRYL) capsule 25 mg  25 mg Oral QHS PRN    oxyCODONE-acetaminophen (PERCOCET) 5-325 mg per tablet 2 Tab  2 Tab Oral Q4H PRN    ELECTROLYTE REPLACEMENT PROTOCOL  1 Each Other PRN      No Known Allergies    Objective:  Vitals:    Vitals:    02/01/17 2336 02/02/17 0047 02/02/17 0414 02/02/17 0825   BP: 106/61  113/67 105/71   Pulse: 94 94 95 89   Resp: 18  18 18   Temp: 98.6 °F (37 °C)  98.7 °F (37.1 °C) 98.2 °F (36.8 °C)   SpO2: 94%  96% 98%   Weight:   85.6 kg (188 lb 11.4 oz)    Height:         Intake and Output:     01/31 1901 - 02/02 0700  In: 1974.8 [P.O.:1324;  I.V.:650.8]  Out: 4045 [Urine:3475]    Physical Examination:  General: Appears stated age  Resp:  Lungs mostly clear  CV:  RRR,  no murmur or rub,+ LE edema  GI:  Soft, distended, NT, + Bowel sounds, no hepatosplenomegaly  Neurologic:  Non focal  Skin:  RLE cellulitis - resolving   :  No mendoza    []    High complexity decision making was performed  []    Patient is at high-risk of decompensation with multiple organ involvement    Lab Data Personally Reviewed: I have reviewed all the pertinent labs, microbiology data and radiology studies during assessment. Recent Labs      02/02/17 0429 02/01/17 0527 01/31/17   0452   NA  133*  132*  135*   K  4.2  4.1  4.5   CL  97  96*  97   CO2  29  27  30   GLU  168*  99  109*   BUN  40*  38*  42*   CREA  1.96*  1.84*  1.98*   CA  9.2  9.1  9.1   MG  1.8  2.1  1.9   ALB  2.6*  2.6*  2.6*   SGOT  11*  13*  12*   ALT  <6*  <6*  7*     Recent Labs      02/02/17 0429 02/01/17 0527 01/31/17   0452   WBC  8.7  5.8  5.4   HGB  10.0*  10.5*  10.0*   HCT  31.8*  33.5*  31.9*   PLT  113*  99*  86*     No results found for: SDES  Lab Results   Component Value Date/Time    Culture result: NO GROWTH 4 DAYS 01/26/2017 03:05 PM    Culture result:  01/25/2017 09:34 PM     LIGHT  STREPTOCOCCI, BETA HEMOLYTIC GROUP C  . .. Penicillin and ampicillin are drugs of choice for treatment of beta-hemolytic streptococcal infections. Susceptibility testing of penicillins and beta-lactams approved by the FDA for treatment of beta-hemolytic streptococcal infections need not be performed routinely, because nonsusceptible isolates are extremely rare.  CLSI 2012      Culture result: MODERATE  CANDIDA TROPICALIS   01/25/2017 09:34 PM    Culture result: NO SIGNIFICANT GROWTH 01/19/2017 01:35 AM     Recent Results (from the past 24 hour(s))   GLUCOSE, POC    Collection Time: 02/01/17 12:07 PM   Result Value Ref Range    Glucose (POC) 165 (H) 65 - 100 mg/dL    Performed by Kristina Sanchez, POC    Collection Time: 02/01/17  4:40 PM   Result Value Ref Range    Glucose (POC) 93 65 - 100 mg/dL    Performed by Unkown     GLUCOSE, POC    Collection Time: 02/01/17  9:40 PM   Result Value Ref Range    Glucose (POC) 90 65 - 100 mg/dL    Performed by Unkown     METABOLIC PANEL, COMPREHENSIVE    Collection Time: 02/02/17  4:29 AM   Result Value Ref Range    Sodium 133 (L) 136 - 145 mmol/L    Potassium 4.2 3.5 - 5.1 mmol/L    Chloride 97 97 - 108 mmol/L    CO2 29 21 - 32 mmol/L    Anion gap 7 5 - 15 mmol/L Glucose 168 (H) 65 - 100 mg/dL    BUN 40 (H) 6 - 20 MG/DL    Creatinine 1.96 (H) 0.70 - 1.30 MG/DL    BUN/Creatinine ratio 20 12 - 20      GFR est AA 42 (L) >60 ml/min/1.73m2    GFR est non-AA 35 (L) >60 ml/min/1.73m2    Calcium 9.2 8.5 - 10.1 MG/DL    Bilirubin, total 0.5 0.2 - 1.0 MG/DL    ALT (SGPT) <6 (L) 12 - 78 U/L    AST (SGOT) 11 (L) 15 - 37 U/L    Alk. phosphatase 99 45 - 117 U/L    Protein, total 6.9 6.4 - 8.2 g/dL    Albumin 2.6 (L) 3.5 - 5.0 g/dL    Globulin 4.3 (H) 2.0 - 4.0 g/dL    A-G Ratio 0.6 (L) 1.1 - 2.2     MAGNESIUM    Collection Time: 02/02/17  4:29 AM   Result Value Ref Range    Magnesium 1.8 1.6 - 2.4 mg/dL   CBC W/O DIFF    Collection Time: 02/02/17  4:29 AM   Result Value Ref Range    WBC 8.7 4.1 - 11.1 K/uL    RBC 4.14 4.10 - 5.70 M/uL    HGB 10.0 (L) 12.1 - 17.0 g/dL    HCT 31.8 (L) 36.6 - 50.3 %    MCV 76.8 (L) 80.0 - 99.0 FL    MCH 24.2 (L) 26.0 - 34.0 PG    MCHC 31.4 30.0 - 36.5 g/dL    RDW 16.4 (H) 11.5 - 14.5 %    PLATELET 554 (L) 848 - 400 K/uL   PTT    Collection Time: 02/02/17  4:29 AM   Result Value Ref Range    aPTT 28.1 22.1 - 32.5 sec    aPTT, therapeutic range     58.0 - 77.0 SECS   GLUCOSE, POC    Collection Time: 02/02/17  7:04 AM   Result Value Ref Range    Glucose (POC) 113 (H) 65 - 100 mg/dL    Performed by [facility]            I have reviewed the flowsheets. Chart and Pertinent Notes have been reviewed. No change in PMH ,family and social history from Consult note.       Quin Camarena MD

## 2017-02-02 NOTE — PROCEDURES
[address]   [address], [address]   PULMONARY FUNCTION       Name:  Adolph Gilmore   MR#:  [id number]   :  1950   Account #:  [de-identified]    Date of Procedure:  2017   Date of Adm:  2017       Spirometry reveals moderate airflow obstruction. Shape of the flow   volume loop indicates both expiratory airflow obstruction and possible   variable inspiratory airflow obstruction. Further clinical correlation is   advised. Cause of inspiratory airflow obstruction include vocal cord   dysfunction or glottic stenosis.          MD NAEEM Hung / Tj.Remberto   D:  2017   15:23   T:  2017   15:37   Job #:  [id number]

## 2017-02-02 NOTE — PROGRESS NOTES
Discussed plan of care for this patient with palliative care  as Mr. Lori Joseph has decided to become a DNR.  will continue to follow for plan of care. Due to financial constraints and lack of social support the patient appears to be a very poor LVAD candidate.     Niranjan Alcazar, MSW, LCSW    Clinical    Jennifer Roman [phone]

## 2017-02-02 NOTE — PALLIATIVE CARE
Palliative Medicine Social Work    Dr. Trey Peralta and I met with patient this morning. He was alert and very pointed in his conversation. States he was just provided some more information about procedure and plan for stent. Related a frustrating visit with his wife last night. States they reviewed the AMD and she become overwhelmed anticipating her responsibility as decision-maker. He states he plans to make all decisions on his own. He trusts no one else in this role. He does not feel well supported by his two sons who live \"just down the road\". He states they have been consumed with their own lives and haven't been around for months. He is not sure they know about his condition or hospitalization. They have not come to see him, and he states he has no desire for them to come now. Inquired more about the importance of emotional resolve/forgiveness in the chance that he may not survive procedure. He states, Richard Franz will just have to live with regrets\". He feels he may have desire to re-engage with family if he makes it through. Patient made decision for DNR and signed DDNR. He is not willing to name anyone as mPOA. Knows that this role defaults to his wife if he loses capacity. He does not plan to lost capacity and hopes the DNR will convey his wishes otherwise. Communicated information to LVAD Program . Thank you for the opportunity to be involved in the care of Spud. Blaire Reese, PORTERW, [facility]-  Palliative Medicine   Respecting Choices ® ACP Facilitator   [phone]

## 2017-02-02 NOTE — PROGRESS NOTES
Follow up visit at patient's request.  He discussed the surgery that he is expecting to have next week. Expressed understandable worry associated with heart surgery. Is reluctant to speak with his sons about it. Younger son, tends to become quite emotional, while older son is increasingly distant and unresponsive. Both live in the [facility]. Has a sense of being alone in his decision making and in practical terms. Shared regrets for having treated his body with disregard while trying to make a living as a younger man. Reported a sense of ambiguity---desiring to live in this world, though his body is failing---while looking forward to meeting God in the world beyond. Came to the difficult decision to attempt surgery after much soul-searching. Seeking to trust surgery, while understanding the limitations of medical technology. Shared a stated desire for continued  support while hospitalized. Will continue to visit as able to provide emotional and spiritual support. [phone]'s Staff  (Greg Alcantara Patient Care Specialist)   Paging Service [phone])

## 2017-02-02 NOTE — CARDIO/PULMONARY
Cardiac Wellness: Visited with Fernanda Oates to provide Krames Coronary Artery procedures education and review plan of care. Fernanda Oates is aware that he will have a Impella supported PCI sometime next week. Discussed the purpose of the procedure and the routine including npo status, moderate sedation, possible treatments/interventions and post arterial site management with bedrest. Fernanda Oates verbalized understanding and questions answered. Encouraged enrollment into the Cardiac Wellness program after discharge as well as the importance of medication compliance, regular follow up with cardiologist, as well as following a Heart Healthy low sodium diet and daily weight checks. Fernanda Oates verbalized full understanding and will continue to follow to educate. Danae Edwards RN

## 2017-02-03 LAB
ALBUMIN SERPL BCP-MCNC: 2.7 G/DL (ref 3.5–5)
ALBUMIN/GLOB SERPL: 0.6 {RATIO} (ref 1.1–2.2)
ALP SERPL-CCNC: 161 U/L (ref 45–117)
ALT SERPL-CCNC: 10 U/L (ref 12–78)
ANION GAP BLD CALC-SCNC: 7 MMOL/L (ref 5–15)
APTT PPP: 28.7 SEC (ref 22.1–32.5)
AST SERPL W P-5'-P-CCNC: 26 U/L (ref 15–37)
BILIRUB SERPL-MCNC: 0.5 MG/DL (ref 0.2–1)
BUN SERPL-MCNC: 45 MG/DL (ref 6–20)
BUN/CREAT SERPL: 24 (ref 12–20)
CALCIUM SERPL-MCNC: 8.6 MG/DL (ref 8.5–10.1)
CHLORIDE SERPL-SCNC: 92 MMOL/L (ref 97–108)
CO2 SERPL-SCNC: 30 MMOL/L (ref 21–32)
CREAT SERPL-MCNC: 1.85 MG/DL (ref 0.7–1.3)
ERYTHROCYTE [DISTWIDTH] IN BLOOD BY AUTOMATED COUNT: 16.4 % (ref 11.5–14.5)
GLOBULIN SER CALC-MCNC: 4.2 G/DL (ref 2–4)
GLUCOSE BLD STRIP.AUTO-MCNC: 178 MG/DL (ref 65–100)
GLUCOSE BLD STRIP.AUTO-MCNC: 182 MG/DL (ref 65–100)
GLUCOSE BLD STRIP.AUTO-MCNC: 84 MG/DL (ref 65–100)
GLUCOSE SERPL-MCNC: 180 MG/DL (ref 65–100)
HCT VFR BLD AUTO: 31.2 % (ref 36.6–50.3)
HGB BLD-MCNC: 9.7 G/DL (ref 12.1–17)
MAGNESIUM SERPL-MCNC: 2 MG/DL (ref 1.6–2.4)
MCH RBC QN AUTO: 23.9 PG (ref 26–34)
MCHC RBC AUTO-ENTMCNC: 31.1 G/DL (ref 30–36.5)
MCV RBC AUTO: 76.8 FL (ref 80–99)
PLATELET # BLD AUTO: 103 K/UL (ref 150–400)
POTASSIUM SERPL-SCNC: 4 MMOL/L (ref 3.5–5.1)
PROT SERPL-MCNC: 6.9 G/DL (ref 6.4–8.2)
RBC # BLD AUTO: 4.06 M/UL (ref 4.1–5.7)
SERVICE CMNT-IMP: ABNORMAL
SERVICE CMNT-IMP: ABNORMAL
SERVICE CMNT-IMP: NORMAL
SODIUM SERPL-SCNC: 129 MMOL/L (ref 136–145)
SODIUM SERPL-SCNC: 131 MMOL/L (ref 136–145)
SRA 100IU/ML UFH SER-ACNC: 2 % (ref 0–20)
SRA UFH SER-IMP: ABNORMAL
THERAPEUTIC RANGE,PTTT: NORMAL SECS (ref 58–77)
UNFRAC HEPARIN LOW DOSE: 75 % (ref 0–20)
WBC # BLD AUTO: 7.1 K/UL (ref 4.1–11.1)

## 2017-02-03 PROCEDURE — 74011250637 HC RX REV CODE- 250/637: Performed by: NURSE PRACTITIONER

## 2017-02-03 PROCEDURE — 74011250637 HC RX REV CODE- 250/637: Performed by: INTERNAL MEDICINE

## 2017-02-03 PROCEDURE — 85027 COMPLETE CBC AUTOMATED: CPT | Performed by: PHYSICIAN ASSISTANT

## 2017-02-03 PROCEDURE — 83735 ASSAY OF MAGNESIUM: CPT | Performed by: PHYSICIAN ASSISTANT

## 2017-02-03 PROCEDURE — 80053 COMPREHEN METABOLIC PANEL: CPT | Performed by: PHYSICIAN ASSISTANT

## 2017-02-03 PROCEDURE — 85730 THROMBOPLASTIN TIME PARTIAL: CPT | Performed by: PHYSICIAN ASSISTANT

## 2017-02-03 PROCEDURE — 84295 ASSAY OF SERUM SODIUM: CPT | Performed by: INTERNAL MEDICINE

## 2017-02-03 PROCEDURE — 82962 GLUCOSE BLOOD TEST: CPT

## 2017-02-03 PROCEDURE — 36415 COLL VENOUS BLD VENIPUNCTURE: CPT | Performed by: PHYSICIAN ASSISTANT

## 2017-02-03 PROCEDURE — 74011250636 HC RX REV CODE- 250/636: Performed by: INTERNAL MEDICINE

## 2017-02-03 PROCEDURE — 74011250637 HC RX REV CODE- 250/637: Performed by: PHYSICIAN ASSISTANT

## 2017-02-03 PROCEDURE — 65660000000 HC RM CCU STEPDOWN

## 2017-02-03 PROCEDURE — 74011636637 HC RX REV CODE- 636/637: Performed by: PHYSICIAN ASSISTANT

## 2017-02-03 RX ORDER — TOLVAPTAN 15 MG/1
15 TABLET ORAL ONCE
Status: COMPLETED | OUTPATIENT
Start: 2017-02-03 | End: 2017-02-03

## 2017-02-03 RX ORDER — BUMETANIDE 1 MG/1
2 TABLET ORAL 2 TIMES DAILY
Status: DISCONTINUED | OUTPATIENT
Start: 2017-02-03 | End: 2017-02-04

## 2017-02-03 RX ADMIN — Medication 81 MG: at 09:19

## 2017-02-03 RX ADMIN — Medication 10 ML: at 21:21

## 2017-02-03 RX ADMIN — MILRINONE LACTATE 0.3 MCG/KG/MIN: 200 INJECTION, SOLUTION INTRAVENOUS at 17:08

## 2017-02-03 RX ADMIN — GABAPENTIN 100 MG: 100 CAPSULE ORAL at 17:18

## 2017-02-03 RX ADMIN — CEPHALEXIN 250 MG: 250 CAPSULE ORAL at 21:21

## 2017-02-03 RX ADMIN — INSULIN GLARGINE 10 UNITS: 100 INJECTION, SOLUTION SUBCUTANEOUS at 09:18

## 2017-02-03 RX ADMIN — Medication 400 MG: at 09:19

## 2017-02-03 RX ADMIN — CARVEDILOL 3.12 MG: 3.12 TABLET, FILM COATED ORAL at 17:19

## 2017-02-03 RX ADMIN — BUMETANIDE 2 MG: 1 TABLET ORAL at 17:18

## 2017-02-03 RX ADMIN — TOLVAPTAN 15 MG: 15 TABLET ORAL at 10:24

## 2017-02-03 RX ADMIN — OXYCODONE HYDROCHLORIDE AND ACETAMINOPHEN 2 TABLET: 5; 325 TABLET ORAL at 21:21

## 2017-02-03 RX ADMIN — ATORVASTATIN CALCIUM 10 MG: 10 TABLET, FILM COATED ORAL at 09:19

## 2017-02-03 RX ADMIN — BUMETANIDE 2 MG: 1 TABLET ORAL at 09:19

## 2017-02-03 RX ADMIN — GLIMEPIRIDE 4 MG: 2 TABLET ORAL at 06:25

## 2017-02-03 RX ADMIN — MILRINONE LACTATE 0.3 MCG/KG/MIN: 200 INJECTION, SOLUTION INTRAVENOUS at 06:13

## 2017-02-03 RX ADMIN — AMIODARONE HYDROCHLORIDE 200 MG: 200 TABLET ORAL at 21:21

## 2017-02-03 RX ADMIN — CEPHALEXIN 250 MG: 250 CAPSULE ORAL at 17:19

## 2017-02-03 RX ADMIN — CARVEDILOL 3.12 MG: 3.12 TABLET, FILM COATED ORAL at 06:24

## 2017-02-03 RX ADMIN — GABAPENTIN 100 MG: 100 CAPSULE ORAL at 09:19

## 2017-02-03 RX ADMIN — OXYCODONE HYDROCHLORIDE AND ACETAMINOPHEN 2 TABLET: 5; 325 TABLET ORAL at 12:41

## 2017-02-03 RX ADMIN — OXYCODONE HYDROCHLORIDE AND ACETAMINOPHEN 2 TABLET: 5; 325 TABLET ORAL at 17:22

## 2017-02-03 RX ADMIN — CEPHALEXIN 250 MG: 250 CAPSULE ORAL at 12:41

## 2017-02-03 RX ADMIN — AMIODARONE HYDROCHLORIDE 200 MG: 200 TABLET ORAL at 09:19

## 2017-02-03 RX ADMIN — CEPHALEXIN 250 MG: 250 CAPSULE ORAL at 09:20

## 2017-02-03 RX ADMIN — Medication 10 ML: at 17:08

## 2017-02-03 RX ADMIN — Medication 10 ML: at 06:13

## 2017-02-03 RX ADMIN — OXYCODONE HYDROCHLORIDE AND ACETAMINOPHEN 2 TABLET: 5; 325 TABLET ORAL at 02:57

## 2017-02-03 RX ADMIN — CARVEDILOL 3.12 MG: 3.12 TABLET, FILM COATED ORAL at 09:24

## 2017-02-03 RX ADMIN — OXYCODONE HYDROCHLORIDE AND ACETAMINOPHEN 2 TABLET: 5; 325 TABLET ORAL at 09:24

## 2017-02-03 NOTE — WOUND CARE
Wound and Skin Consult / New Consult- for bilateral weepy leg wounds. Following patient since 1-23-17, with wound care orders in place. Actually wounds improved except for right medial lower ankle, which has had the most slough and very tender. Wound care orders adjusted for that area. Continue to follow and assess wounds.  Cayla Ny RN/WOCN

## 2017-02-03 NOTE — PROGRESS NOTES
Infectious Diseases Progress Note    Antibiotic Summary:  Levaquin  --   Vancomycin  --   Ancef    -- 2/3  Keflex   To begin 2/3      Subjective:     No new problems    Objective:     Vitals:   Visit Vitals    BP 97/65 (BP 1 Location: Right arm, BP Patient Position: At rest;Sitting)    Pulse 89    Temp 98.6 °F (37 °C)    Resp 18    Ht 5' 9\" (1.753 m)    Wt 85.6 kg (188 lb 11.4 oz)    SpO2 98%    BMI 27.87 kg/m2        Tmax:  Temp (24hrs), Av.4 °F (36.9 °C), Min:98 °F (36.7 °C), Max:98.7 °F (37.1 °C)      Exam:  General appearance: alert, no distress  Lungs: few rales at bases  Heart: RRR  Abdomen: nontender  Left leg: improving  Right leg: improving    IV Lines: Left PICC inserted     Labs:    Recent Labs      17   0429  17   0527  17   0452   WBC  8.7  5.8  5.4   HGB  10.0*  10.5*  10.0*   PLT  113*  99*  86*   BUN  40*  38*  42*   CREA  1.96*  1.84*  1.98*   TBILI  0.5  0.4  0.4   SGOT  11*  13*  12*   AP  99  89  86     Culture right leg ulcers  = group C Streptococcus    Assessment:     1. Bilateral venous stasis disease of the LEs +/- cellulitis: Improving     2. OHD -- IHD/CAD; CHF; pulm HTN     3. CRF with acute exacerbation     4. NIDDM -- new diagnosis     5. Probable COPD -- heavy smoking since age 7      10. Anemia -- HC/MC -- positive family history of colon cancer -- may need GI W/U    Plan:     1.  Change to po Keflex 250 mg po qid      Kathleen Zapien MD

## 2017-02-03 NOTE — PROGRESS NOTES
Spoke at length with Mr Loida Velazquez and his wife who was on the telephone. Based on his MRI, his anterior and lateral wall are viable. Unfortunately, his EF is still in the 5-10% range and he has moderate to severe RV dysfunction despite milrinone and aggressive diuresis. At this stage I think his best chance for survival is a high risk left main stent with Impella support. Currently organizing a team for this approach. Shooting for getting this done hopefully on Tuesday. Patient has some specific requests. He does not want to be on prolonged support or be a burden to his family. He is willing to undergo intubation. If he goes into V-tach he is willing to be shocked. Should he require CPR he does not want it to be prolonged.

## 2017-02-03 NOTE — PROGRESS NOTES
[facility] Progress Note      NAME:  Fernanda Booth. :   1950   MRN:   [id number]   PCP:  None  CARD:   Dr. Thea Allen    Date:  February 3, 2017     Fernanda Booth. is a 61 y.o. male with a who presented for further evaluation of severe CAD and systolic heart failure. Subjective:   He was initially seen at [facility] 3 years ago and told that he had heart failure with LVEF 30%. He was lost to follow up due to lack of insurance and has not been seen by a physician in the interim. He complains of progressive fatigue, NAVARRO, PND, and edema over the past year, prompting presentation to [facility]. Resting comfortable - denies c/o    Past 24 hours:  Denies significant c/o - lots of questions about PCI procedure  Remains on milrinone gtt  Hemodynamics stable    Objective:     Visit Vitals    /59 (BP 1 Location: Right arm, BP Patient Position: At rest)    Pulse 84    Temp 97.7 °F (36.5 °C)    Resp 16    Ht 5' 9\" (1.753 m)    Wt 189 lb 9.5 oz (86 kg)    SpO2 94%    BMI 28 kg/m2        Physical Exam:    Gen:  WA, WN, NAD  HEENT:  EOMI  Neck:  (+) JVD  CVS:  S1/S2,  +S3  Pul:  Decreased L base, R side clear  Abd:  Soft, NT/ND  Ext:  1+ b/l LE edema, multiple bandages in place, +erythema  Neuro:  No obvious deficits     1901 -  0700  In: 3109.5 [P.O.:2468; I.V.:641.5]  Out: 3400 [Urine:3400]  O2 Flow Rate (L/min): 1 l/min O2 Device: Nasal cannula  Temp (24hrs), Av.8 °F (36.6 °C), Min:97.4 °F (36.3 °C), Max:98.6 °F (37 °C)          Care Plan discussed with:    Comments   Patient x    Family      RN x    Care Manager                    Consultant:          Past History:     Past Medical History   Diagnosis Date    Cardiomyopathy ([facility] [address] 2017     A. Echo (17):  EF 5-10% with severe GHK,. Mildly dil LA. Mild TR. PASP 46. No past surgical history on file.     Social History   Substance Use Topics    Smoking status: Not on file    Smokeless tobacco: Not on file    Alcohol use Not on file        No family history on file. Allergies:   No Known Allergies       Data Review:     CXR:   CXR Results  (Last 48 hours)    None        Echocardiogram:   Echo Results  (Last 48 hours)    None        No results found for this visit on 01/20/17. ECG: sinus rhythm on telemetry  LABS:  Recent Results (from the past 24 hour(s))   GLUCOSE, POC    Collection Time: 02/02/17  4:28 PM   Result Value Ref Range    Glucose (POC) 80 65 - 100 mg/dL    Performed by Unkown     GLUCOSE, POC    Collection Time: 02/02/17  9:22 PM   Result Value Ref Range    Glucose (POC) 107 (H) 65 - 100 mg/dL    Performed by Unkown     METABOLIC PANEL, COMPREHENSIVE    Collection Time: 02/03/17  2:59 AM   Result Value Ref Range    Sodium 129 (L) 136 - 145 mmol/L    Potassium 4.0 3.5 - 5.1 mmol/L    Chloride 92 (L) 97 - 108 mmol/L    CO2 30 21 - 32 mmol/L    Anion gap 7 5 - 15 mmol/L    Glucose 180 (H) 65 - 100 mg/dL    BUN 45 (H) 6 - 20 MG/DL    Creatinine 1.85 (H) 0.70 - 1.30 MG/DL    BUN/Creatinine ratio 24 (H) 12 - 20      GFR est AA 45 (L) >60 ml/min/1.73m2    GFR est non-AA 37 (L) >60 ml/min/1.73m2    Calcium 8.6 8.5 - 10.1 MG/DL    Bilirubin, total 0.5 0.2 - 1.0 MG/DL    ALT (SGPT) 10 (L) 12 - 78 U/L    AST (SGOT) 26 15 - 37 U/L    Alk.  phosphatase 161 (H) 45 - 117 U/L    Protein, total 6.9 6.4 - 8.2 g/dL    Albumin 2.7 (L) 3.5 - 5.0 g/dL    Globulin 4.2 (H) 2.0 - 4.0 g/dL    A-G Ratio 0.6 (L) 1.1 - 2.2     MAGNESIUM    Collection Time: 02/03/17  2:59 AM   Result Value Ref Range    Magnesium 2.0 1.6 - 2.4 mg/dL   CBC W/O DIFF    Collection Time: 02/03/17  2:59 AM   Result Value Ref Range    WBC 7.1 4.1 - 11.1 K/uL    RBC 4.06 (L) 4.10 - 5.70 M/uL    HGB 9.7 (L) 12.1 - 17.0 g/dL    HCT 31.2 (L) 36.6 - 50.3 %    MCV 76.8 (L) 80.0 - 99.0 FL    MCH 23.9 (L) 26.0 - 34.0 PG    MCHC 31.1 30.0 - 36.5 g/dL    RDW 16.4 (H) 11.5 - 14.5 %    PLATELET 803 (L) 509 - 400 K/uL   PTT    Collection Time: 02/03/17  2:59 AM   Result Value Ref Range    aPTT 28.7 22.1 - 32.5 sec    aPTT, therapeutic range     58.0 - 77.0 SECS   GLUCOSE, POC    Collection Time: 02/03/17  6:23 AM   Result Value Ref Range    Glucose (POC) 84 65 - 100 mg/dL    Performed by SAADIA DONALD(LPN student)    GLUCOSE, POC    Collection Time: 02/03/17 11:42 AM   Result Value Ref Range    Glucose (POC) 182 (H) 65 - 100 mg/dL    Performed by Hal Michelle    SODIUM    Collection Time: 02/03/17 12:44 PM   Result Value Ref Range    Sodium 131 (L) 136 - 145 mmol/L     All Micro Results     Procedure Component Value Units Date/Time    CULTURE, BODY FLUID Quillian Chill STAIN [[id number]] Collected:  01/26/17 1505    Order Status:  Completed Specimen:  Thoracentesis Updated:  01/30/17 1057     Special Requests: NO SPECIAL REQUESTS        GRAM STAIN OCCASIONAL  WBCS SEEN         NO ORGANISMS SEEN        Culture result: NO GROWTH 4 DAYS       CULTURE, Sai Reidck STAIN [[id number]] Collected:  01/25/17 2134    Order Status:  Completed Specimen:  Leg Updated:  01/27/17 1440     Special Requests: NO SPECIAL REQUESTS        GRAM STAIN RARE  WBCS SEEN         NO ORGANISMS SEEN        Culture result: LIGHT  STREPTOCOCCI, BETA HEMOLYTIC GROUP C  . .. Penicillin and ampicillin are drugs of choice for treatment of beta-hemolytic streptococcal infections. Susceptibility testing of penicillins and beta-lactams approved by the FDA for treatment of beta-hemolytic streptococcal infections need not be performed routinely, because nonsusceptible isolates are extremely rare. CLSI 2012         MODERATE  BETTIE TROPICALIS             Specimen Source   1: Pleural Fluid   CYTOLOGIC INTERPRETATION:   Scattered mesothelial cells with degenerative changes and mixed inflammatory cells   General Categorization   No cells diagnostic for malignancy   Specimen Adequacy   Satisfactory for evaluation        IMPRESSION  1.  Markedly dilated left ventricle with 3-D end-diastolic volume index to body  surface area of 153 mL/sq m. Mild eccentric left ventricular hypertrophy with  3-D mass index to body surface area of 85 g/sq m. Severe left ventricular  systolic dysfunction. Severe global hypokinesis with regional variation. 3-D  LVEF 10%. 2. Moderately dilated right ventricle by 3-D volumetric assessment. RV end  diastolic volume indexed to body surface area of 138 mL/sq m. Moderate to severe  right ventricular systolic dysfunction. Global hypokinesis. 3-D RVEF 25%. 3. Apical a tethered mitral valve leaflets. Mild mitral regurgitation. 4. Moderately severe 3+ tricuspid regurgitation. 5. On LGE study, the anterior wall, anteroseptal wall, anteroapical wall, and  anterior lateral wall are largely viable without significant myocardial  infarction. The entire inferior wall and inferoseptal wall are completely viable  without any infarct. The base to mid inferolateral wall demonstrate a near  transmural greater than 75% thickness infarct with minimal or no viable  myocardium in this territory. The distal inferolateral wall, apical lateral wall  does not demonstrate any infarct and are largely viable. Based on the viability  imaging, the entire LAD territory is largely viable and should recover upon  revascularization. The entire RCA territory is largely viable and should recover  upon revascularization. The LCx territory demonstrate medium-size infarct with  limited viability. 6. Large left-sided pleural effusion with passive atelectasis of the left lung. 7. Dilated branch pulmonary arteries suggest pulmonary hypertension. 8. Markedly dilated left atrium measuring 59 x 55 mm. Moderately dilated right  atrium measuring 46 x 56 mm.     Medications reviewed:    Current Facility-Administered Medications   Medication Dose Route Frequency    bumetanide (BUMEX) tablet 2 mg  2 mg Oral BID    nicotine (NICODERM CQ) 21 mg/24 hr patch 1 Patch  1 Patch TransDERmal DAILY    cephALEXin (KEFLEX) capsule 250 mg  250 mg Oral QID    glimepiride (AMARYL) tablet 4 mg  4 mg Oral ACB    insulin glargine (LANTUS) injection 10 Units  10 Units SubCUTAneous DAILY    gabapentin (NEURONTIN) capsule 100 mg  100 mg Oral BID    milrinone (PRIMACOR) 20 MG/100 ML D5W infusion  0.3 mcg/kg/min IntraVENous CONTINUOUS    carvedilol (COREG) tablet 3.125 mg  3.125 mg Oral BID WITH MEALS    amiodarone (CORDARONE) tablet 200 mg  200 mg Oral Q12H    bacitracin 500 unit/gram packet 1 Packet  1 Packet Topical PRN    atorvastatin (LIPITOR) tablet 10 mg  10 mg Oral DAILY    0.9% sodium chloride infusion  10 mL/hr IntraVENous CONTINUOUS    acetaminophen (TYLENOL) tablet 650 mg  650 mg Oral Q6H PRN    aspirin delayed-release tablet 81 mg  81 mg Oral DAILY    glucagon (GLUCAGEN) injection 1 mg  1 mg IntraMUSCular PRN    glucose chewable tablet 16 g  4 Tab Oral PRN    insulin lispro (HUMALOG) injection   SubCUTAneous AC&HS    magnesium oxide (MAG-OX) tablet 400 mg  400 mg Oral DAILY    0.9% sodium chloride infusion  3 mL/hr IntraVENous CONTINUOUS    sodium chloride (NS) flush 5-10 mL  5-10 mL IntraVENous Q8H    sodium chloride (NS) flush 5-10 mL  5-10 mL IntraVENous PRN    albuterol (PROVENTIL VENTOLIN) nebulizer solution 2.5 mg  2.5 mg Nebulization Q4H PRN    diphenhydrAMINE (BENADRYL) capsule 25 mg  25 mg Oral QHS PRN    oxyCODONE-acetaminophen (PERCOCET) 5-325 mg per tablet 2 Tab  2 Tab Oral Q4H PRN    ELECTROLYTE REPLACEMENT PROTOCOL  1 Each Other PRN     PFTs  FVC 2.62 60% predicted             FEV1 1.51 50% predicted             FEV1/FVC 54.57      IMPRESSION/Plan:   Acute on chronic systolic heart failure (ICM) - Stage D, NYHA Class IV - Continue milrinone at   0.3 mcg/kg/min, Continue low dose Coreg and Bumex, Strict I/Os, Replete electrolytes prn, Hold ACE-I due to CHACORTA on CKD.  Daily weight    Severe native coronary artery disease w/ viability - Continue ASA, statin, low dose BB; no ACEi d/t renal dysfunction, tentative plan for high risk PCI w/ Impella support on Tuesday. Pt had larissa discussion w/ Dr. Sebas Henderson and Dr. Kehinde Crawford and potential risks to PCI. Elevated Alk Phos w/ RUQ tednerness - abdominal ultrasound    CECILIA - REJI positive, platlets 103 - close f/u    Hyponatremia - sodium down today - one dose of Tolvaptan per renal.  Close monitoring    Diabetes Mellitus - cont. Current treatment coverage, close monitor of BS. DTC following    Cellulitis - abx changed to po per ID.   Legs improving    H/o Tobacco abuse - smoking cessation, nicotine patch    CHACORTA on CKD3 - renal following, diuretic decreased per renal, slight decrease in BUN/Cr    Anemia - monitor    Pulmonary HTN -          Pt seen with Dr. Kehinde Crawford and Dr. Sebas Henderson    Thank you for letting me see him with you,    KENYETTA Schwartz 1721      Saw patient, agree with above  Risk of morbidity and mortality - high  Medical decision making - high complexity

## 2017-02-03 NOTE — PROGRESS NOTES
Patient was appreciative of 's visit. Patient preferred to speak with  Mirian Nieto as they have already covered significant subject and built the trust needed to talk about sensitive issues. Patient overall was in good spirits and enjoying a pop cicle at time of chaplains visit. Court Lockhart M.S., M.Div.   [address] ([id number])

## 2017-02-03 NOTE — PROGRESS NOTES
0620:pt given morning medications, pt's BS 84, pt requesting cereal and popsicle, pt refused bath this morning stated he wanted it at a later time,pt urinated 450 ml, standing weight done on pt, pt sitting on the side of the bed, cardiac monitor on. Bedside and Verbal shift change report given to hortencia albarran (oncoming nurse) by Jenn Medina (offgoing nurse). Report included the following information SBAR, Kardex, ED Summary, MAR, Recent Results and Cardiac Rhythm nsr bbb.

## 2017-02-03 NOTE — PROGRESS NOTES
[facility]   [address] [address] [address], Formerly named [facility]  Phone: [phone]   [phone]       Nephrology Progress Note  Martin Kilgore.     1950     [id number]  Date of Admission : 1/20/2017 02/03/17    CC: Follow up for CKD/ARF      Assessment and Plan   CHACORTA on CKD :  - CHACORTA likely 2/2 cardiorenal syndrome + IVVD from diuretics   - reduced Bumex to 2 mg BID today as we are giving him a dose of tolvaptan     CKD :  · Baseline Cr unknown   · Presumed to be 2/2 untreated DM, HTN and CMP at this time     Thrombocytopenia :  -  CECILIA Ab +ve. REJI pending   -  Other possibilities :  Ancef , hypersplenism from alcoholism    Peripheral Neuropathy :  On Gabapentin     Hyponatremia :  - combination of  CHF + SIADH from chronic alcoholism  - Na stable after 2 doses of Tolvaptan   - Diuresis w/ loops . AVOID THIAZIDES   - Na at 129 and more fluid overloaded  -- one dose of Tolvaptan today     Acute on Chronic Systolic CHF w/ severe CMP  - echo with EF 5-10%, severely dilated LV, severe TR  - Multivessel CAD : MRI showed viable myocardium   - on Milrinone  - High PCI planned from 2/7    Cellulitis RLE w/ Pustular lesions : On Ancef per ID    Pulm HTN     Chronic smoker w/VENANCIO -PNA         Interval History:  High risk PCI planned for next week   1 lb weight gain and edema despite good diuresis   Na dropped   No cp, sob, n/v/d reported. Review of Systems: Pertinent items are noted in HPI.     Current Medications:   Current Facility-Administered Medications   Medication Dose Route Frequency    bumetanide (BUMEX) tablet 2 mg  2 mg Oral BID    tolvaptan (SAMSCA) tablet 15 mg  15 mg Oral ONCE    nicotine (NICODERM CQ) 21 mg/24 hr patch 1 Patch  1 Patch TransDERmal DAILY    cephALEXin (KEFLEX) capsule 250 mg  250 mg Oral QID    glimepiride (AMARYL) tablet 4 mg  4 mg Oral ACB    insulin glargine (LANTUS) injection 10 Units  10 Units SubCUTAneous DAILY    gabapentin (NEURONTIN) capsule 100 mg  100 mg Oral BID    milrinone (PRIMACOR) 20 MG/100 ML D5W infusion  0.3 mcg/kg/min IntraVENous CONTINUOUS    carvedilol (COREG) tablet 3.125 mg  3.125 mg Oral BID WITH MEALS    amiodarone (CORDARONE) tablet 200 mg  200 mg Oral Q12H    bacitracin 500 unit/gram packet 1 Packet  1 Packet Topical PRN    atorvastatin (LIPITOR) tablet 10 mg  10 mg Oral DAILY    0.9% sodium chloride infusion  10 mL/hr IntraVENous CONTINUOUS    acetaminophen (TYLENOL) tablet 650 mg  650 mg Oral Q6H PRN    aspirin delayed-release tablet 81 mg  81 mg Oral DAILY    glucagon (GLUCAGEN) injection 1 mg  1 mg IntraMUSCular PRN    glucose chewable tablet 16 g  4 Tab Oral PRN    insulin lispro (HUMALOG) injection   SubCUTAneous AC&HS    magnesium oxide (MAG-OX) tablet 400 mg  400 mg Oral DAILY    0.9% sodium chloride infusion  3 mL/hr IntraVENous CONTINUOUS    sodium chloride (NS) flush 5-10 mL  5-10 mL IntraVENous Q8H    sodium chloride (NS) flush 5-10 mL  5-10 mL IntraVENous PRN    albuterol (PROVENTIL VENTOLIN) nebulizer solution 2.5 mg  2.5 mg Nebulization Q4H PRN    diphenhydrAMINE (BENADRYL) capsule 25 mg  25 mg Oral QHS PRN    oxyCODONE-acetaminophen (PERCOCET) 5-325 mg per tablet 2 Tab  2 Tab Oral Q4H PRN    ELECTROLYTE REPLACEMENT PROTOCOL  1 Each Other PRN      No Known Allergies    Objective:  Vitals:    Vitals:    02/02/17 2339 02/03/17 0256 02/03/17 0621 02/03/17 0743   BP: 114/71 115/75  105/65   Pulse: 85 87  87   Resp: 18 20  18   Temp: 97.4 °F (36.3 °C) 97.5 °F (36.4 °C)  97.7 °F (36.5 °C)   SpO2: 95% 97%  98%   Weight:   86 kg (189 lb 9.5 oz)    Height:         Intake and Output:     02/01 1901 - 02/03 0700  In: 3109.5 [P.O.:2468; I.V.:641.5]  Out: 3400 [Urine:3400]    Physical Examination:  General: Appears stated age  Resp:  Lungs mostly clear  CV:  RRR,  no murmur or rub,+ LE edema  GI:  Soft, distended, NT, + Bowel sounds, no hepatosplenomegaly  Neurologic:  Non focal  Skin:  RLE cellulitis - resolving :  No mendoza    []    High complexity decision making was performed  []    Patient is at high-risk of decompensation with multiple organ involvement    Lab Data Personally Reviewed: I have reviewed all the pertinent labs, microbiology data and radiology studies during assessment. Recent Labs      02/03/17 0259 02/02/17 0429 02/01/17 0527   NA  129*  133*  132*   K  4.0  4.2  4.1   CL  92*  97  96*   CO2  30  29  27   GLU  180*  168*  99   BUN  45*  40*  38*   CREA  1.85*  1.96*  1.84*   CA  8.6  9.2  9.1   MG  2.0  1.8  2.1   ALB  2.7*  2.6*  2.6*   SGOT  26  11*  13*   ALT  10*  <6*  <6*     Recent Labs      02/03/17 0259 02/02/17 0429 02/01/17 0527   WBC  7.1  8.7  5.8   HGB  9.7*  10.0*  10.5*   HCT  31.2*  31.8*  33.5*   PLT  103*  113*  99*     No results found for: SDES  Lab Results   Component Value Date/Time    Culture result: NO GROWTH 4 DAYS 01/26/2017 03:05 PM    Culture result:  01/25/2017 09:34 PM     LIGHT  STREPTOCOCCI, BETA HEMOLYTIC GROUP C  . .. Penicillin and ampicillin are drugs of choice for treatment of beta-hemolytic streptococcal infections. Susceptibility testing of penicillins and beta-lactams approved by the FDA for treatment of beta-hemolytic streptococcal infections need not be performed routinely, because nonsusceptible isolates are extremely rare.  CLSI 2012      Culture result: MODERATE  CANDIDA TROPICALIS   01/25/2017 09:34 PM    Culture result: NO SIGNIFICANT GROWTH 01/19/2017 01:35 AM     Recent Results (from the past 24 hour(s))   GLUCOSE, POC    Collection Time: 02/02/17 11:41 AM   Result Value Ref Range    Glucose (POC) 91 65 - 100 mg/dL    Performed by Lesly Ridley, POC    Collection Time: 02/02/17  4:28 PM   Result Value Ref Range    Glucose (POC) 80 65 - 100 mg/dL    Performed by Unkown     GLUCOSE, POC    Collection Time: 02/02/17  9:22 PM   Result Value Ref Range    Glucose (POC) 107 (H) 65 - 100 mg/dL    Performed by Unkown  METABOLIC PANEL, COMPREHENSIVE    Collection Time: 02/03/17  2:59 AM   Result Value Ref Range    Sodium 129 (L) 136 - 145 mmol/L    Potassium 4.0 3.5 - 5.1 mmol/L    Chloride 92 (L) 97 - 108 mmol/L    CO2 30 21 - 32 mmol/L    Anion gap 7 5 - 15 mmol/L    Glucose 180 (H) 65 - 100 mg/dL    BUN 45 (H) 6 - 20 MG/DL    Creatinine 1.85 (H) 0.70 - 1.30 MG/DL    BUN/Creatinine ratio 24 (H) 12 - 20      GFR est AA 45 (L) >60 ml/min/1.73m2    GFR est non-AA 37 (L) >60 ml/min/1.73m2    Calcium 8.6 8.5 - 10.1 MG/DL    Bilirubin, total 0.5 0.2 - 1.0 MG/DL    ALT (SGPT) 10 (L) 12 - 78 U/L    AST (SGOT) 26 15 - 37 U/L    Alk. phosphatase 161 (H) 45 - 117 U/L    Protein, total 6.9 6.4 - 8.2 g/dL    Albumin 2.7 (L) 3.5 - 5.0 g/dL    Globulin 4.2 (H) 2.0 - 4.0 g/dL    A-G Ratio 0.6 (L) 1.1 - 2.2     MAGNESIUM    Collection Time: 02/03/17  2:59 AM   Result Value Ref Range    Magnesium 2.0 1.6 - 2.4 mg/dL   CBC W/O DIFF    Collection Time: 02/03/17  2:59 AM   Result Value Ref Range    WBC 7.1 4.1 - 11.1 K/uL    RBC 4.06 (L) 4.10 - 5.70 M/uL    HGB 9.7 (L) 12.1 - 17.0 g/dL    HCT 31.2 (L) 36.6 - 50.3 %    MCV 76.8 (L) 80.0 - 99.0 FL    MCH 23.9 (L) 26.0 - 34.0 PG    MCHC 31.1 30.0 - 36.5 g/dL    RDW 16.4 (H) 11.5 - 14.5 %    PLATELET 488 (L) 814 - 400 K/uL   PTT    Collection Time: 02/03/17  2:59 AM   Result Value Ref Range    aPTT 28.7 22.1 - 32.5 sec    aPTT, therapeutic range     58.0 - 77.0 SECS   GLUCOSE, POC    Collection Time: 02/03/17  6:23 AM   Result Value Ref Range    Glucose (POC) 84 65 - 100 mg/dL    Performed by SAADIA DONALD(LPN student)            I have reviewed the flowsheets. Chart and Pertinent Notes have been reviewed. No change in PMH ,family and social history from Consult note.       Jose L Whipple MD

## 2017-02-04 ENCOUNTER — APPOINTMENT (OUTPATIENT)
Dept: ULTRASOUND IMAGING | Age: 67
DRG: 215 | End: 2017-02-04
Attending: PHYSICIAN ASSISTANT
Payer: SELF-PAY

## 2017-02-04 ENCOUNTER — APPOINTMENT (OUTPATIENT)
Dept: GENERAL RADIOLOGY | Age: 67
DRG: 215 | End: 2017-02-04
Attending: PHYSICIAN ASSISTANT
Payer: SELF-PAY

## 2017-02-04 LAB
ALBUMIN SERPL BCP-MCNC: 2.7 G/DL (ref 3.5–5)
ALBUMIN/GLOB SERPL: 0.6 {RATIO} (ref 1.1–2.2)
ALP SERPL-CCNC: 171 U/L (ref 45–117)
ALT SERPL-CCNC: 11 U/L (ref 12–78)
ANION GAP BLD CALC-SCNC: 9 MMOL/L (ref 5–15)
APTT PPP: 29.4 SEC (ref 22.1–32.5)
AST SERPL W P-5'-P-CCNC: 25 U/L (ref 15–37)
BILIRUB SERPL-MCNC: 0.5 MG/DL (ref 0.2–1)
BUN SERPL-MCNC: 49 MG/DL (ref 6–20)
BUN/CREAT SERPL: 27 (ref 12–20)
CALCIUM SERPL-MCNC: 8.9 MG/DL (ref 8.5–10.1)
CHLORIDE SERPL-SCNC: 95 MMOL/L (ref 97–108)
CO2 SERPL-SCNC: 28 MMOL/L (ref 21–32)
CREAT SERPL-MCNC: 1.82 MG/DL (ref 0.7–1.3)
ERYTHROCYTE [DISTWIDTH] IN BLOOD BY AUTOMATED COUNT: 16.5 % (ref 11.5–14.5)
GLOBULIN SER CALC-MCNC: 4.3 G/DL (ref 2–4)
GLUCOSE BLD STRIP.AUTO-MCNC: 125 MG/DL (ref 65–100)
GLUCOSE BLD STRIP.AUTO-MCNC: 130 MG/DL (ref 65–100)
GLUCOSE BLD STRIP.AUTO-MCNC: 181 MG/DL (ref 65–100)
GLUCOSE BLD STRIP.AUTO-MCNC: 201 MG/DL (ref 65–100)
GLUCOSE SERPL-MCNC: 161 MG/DL (ref 65–100)
HCT VFR BLD AUTO: 32.3 % (ref 36.6–50.3)
HGB BLD-MCNC: 10.1 G/DL (ref 12.1–17)
MAGNESIUM SERPL-MCNC: 2.1 MG/DL (ref 1.6–2.4)
MCH RBC QN AUTO: 24.2 PG (ref 26–34)
MCHC RBC AUTO-ENTMCNC: 31.3 G/DL (ref 30–36.5)
MCV RBC AUTO: 77.5 FL (ref 80–99)
PLATELET # BLD AUTO: 124 K/UL (ref 150–400)
POTASSIUM SERPL-SCNC: 4.4 MMOL/L (ref 3.5–5.1)
PROT SERPL-MCNC: 7 G/DL (ref 6.4–8.2)
RBC # BLD AUTO: 4.17 M/UL (ref 4.1–5.7)
SERVICE CMNT-IMP: ABNORMAL
SODIUM SERPL-SCNC: 132 MMOL/L (ref 136–145)
THERAPEUTIC RANGE,PTTT: NORMAL SECS (ref 58–77)
WBC # BLD AUTO: 5.3 K/UL (ref 4.1–11.1)

## 2017-02-04 PROCEDURE — 74011250636 HC RX REV CODE- 250/636: Performed by: PHYSICIAN ASSISTANT

## 2017-02-04 PROCEDURE — 36415 COLL VENOUS BLD VENIPUNCTURE: CPT | Performed by: PHYSICIAN ASSISTANT

## 2017-02-04 PROCEDURE — 74011000258 HC RX REV CODE- 258: Performed by: PHYSICIAN ASSISTANT

## 2017-02-04 PROCEDURE — 74011250637 HC RX REV CODE- 250/637: Performed by: PHYSICIAN ASSISTANT

## 2017-02-04 PROCEDURE — 65660000000 HC RM CCU STEPDOWN

## 2017-02-04 PROCEDURE — 82962 GLUCOSE BLOOD TEST: CPT

## 2017-02-04 PROCEDURE — 80053 COMPREHEN METABOLIC PANEL: CPT | Performed by: PHYSICIAN ASSISTANT

## 2017-02-04 PROCEDURE — 77010033678 HC OXYGEN DAILY

## 2017-02-04 PROCEDURE — 74011250637 HC RX REV CODE- 250/637: Performed by: INTERNAL MEDICINE

## 2017-02-04 PROCEDURE — 74011250636 HC RX REV CODE- 250/636: Performed by: INTERNAL MEDICINE

## 2017-02-04 PROCEDURE — 74011636637 HC RX REV CODE- 636/637: Performed by: PHYSICIAN ASSISTANT

## 2017-02-04 PROCEDURE — 76705 ECHO EXAM OF ABDOMEN: CPT

## 2017-02-04 PROCEDURE — 85027 COMPLETE CBC AUTOMATED: CPT | Performed by: PHYSICIAN ASSISTANT

## 2017-02-04 PROCEDURE — 74011636637 HC RX REV CODE- 636/637: Performed by: NURSE PRACTITIONER

## 2017-02-04 PROCEDURE — 74011250637 HC RX REV CODE- 250/637: Performed by: NURSE PRACTITIONER

## 2017-02-04 PROCEDURE — 83735 ASSAY OF MAGNESIUM: CPT | Performed by: PHYSICIAN ASSISTANT

## 2017-02-04 PROCEDURE — 71010 XR CHEST PORT: CPT

## 2017-02-04 PROCEDURE — 85730 THROMBOPLASTIN TIME PARTIAL: CPT | Performed by: PHYSICIAN ASSISTANT

## 2017-02-04 RX ORDER — BUMETANIDE 1 MG/1
2 TABLET ORAL 3 TIMES DAILY
Status: DISCONTINUED | OUTPATIENT
Start: 2017-02-04 | End: 2017-02-07

## 2017-02-04 RX ADMIN — ATORVASTATIN CALCIUM 10 MG: 10 TABLET, FILM COATED ORAL at 09:45

## 2017-02-04 RX ADMIN — Medication 10 ML: at 18:55

## 2017-02-04 RX ADMIN — CARVEDILOL 3.12 MG: 3.12 TABLET, FILM COATED ORAL at 09:45

## 2017-02-04 RX ADMIN — BUMETANIDE 2 MG: 1 TABLET ORAL at 09:45

## 2017-02-04 RX ADMIN — OXYCODONE HYDROCHLORIDE AND ACETAMINOPHEN 2 TABLET: 5; 325 TABLET ORAL at 20:17

## 2017-02-04 RX ADMIN — AMIODARONE HYDROCHLORIDE 200 MG: 200 TABLET ORAL at 09:45

## 2017-02-04 RX ADMIN — CEPHALEXIN 250 MG: 250 CAPSULE ORAL at 09:45

## 2017-02-04 RX ADMIN — INSULIN GLARGINE 10 UNITS: 100 INJECTION, SOLUTION SUBCUTANEOUS at 09:46

## 2017-02-04 RX ADMIN — Medication 81 MG: at 09:45

## 2017-02-04 RX ADMIN — GABAPENTIN 100 MG: 100 CAPSULE ORAL at 17:23

## 2017-02-04 RX ADMIN — CARVEDILOL 3.12 MG: 3.12 TABLET, FILM COATED ORAL at 17:23

## 2017-02-04 RX ADMIN — INSULIN LISPRO 2 UNITS: 100 INJECTION, SOLUTION INTRAVENOUS; SUBCUTANEOUS at 17:23

## 2017-02-04 RX ADMIN — OXYCODONE HYDROCHLORIDE AND ACETAMINOPHEN 2 TABLET: 5; 325 TABLET ORAL at 01:06

## 2017-02-04 RX ADMIN — PIPERACILLIN AND TAZOBACTAM 3.38 G: 3; .375 INJECTION, POWDER, FOR SOLUTION INTRAVENOUS at 22:11

## 2017-02-04 RX ADMIN — BUMETANIDE 2 MG: 1 TABLET ORAL at 22:11

## 2017-02-04 RX ADMIN — Medication 10 ML: at 22:11

## 2017-02-04 RX ADMIN — OXYCODONE HYDROCHLORIDE AND ACETAMINOPHEN 2 TABLET: 5; 325 TABLET ORAL at 16:14

## 2017-02-04 RX ADMIN — GABAPENTIN 100 MG: 100 CAPSULE ORAL at 09:45

## 2017-02-04 RX ADMIN — BUMETANIDE 2 MG: 1 TABLET ORAL at 16:13

## 2017-02-04 RX ADMIN — Medication 10 ML: at 14:36

## 2017-02-04 RX ADMIN — Medication 400 MG: at 09:45

## 2017-02-04 RX ADMIN — PIPERACILLIN AND TAZOBACTAM 3.38 G: 3; .375 INJECTION, POWDER, FOR SOLUTION INTRAVENOUS at 14:35

## 2017-02-04 RX ADMIN — MILRINONE LACTATE 0.3 MCG/KG/MIN: 200 INJECTION, SOLUTION INTRAVENOUS at 04:52

## 2017-02-04 RX ADMIN — AMIODARONE HYDROCHLORIDE 200 MG: 200 TABLET ORAL at 20:17

## 2017-02-04 RX ADMIN — OXYCODONE HYDROCHLORIDE AND ACETAMINOPHEN 2 TABLET: 5; 325 TABLET ORAL at 09:45

## 2017-02-04 RX ADMIN — OXYCODONE HYDROCHLORIDE AND ACETAMINOPHEN 2 TABLET: 5; 325 TABLET ORAL at 04:52

## 2017-02-04 RX ADMIN — MILRINONE LACTATE 0.3 MCG/KG/MIN: 200 INJECTION, SOLUTION INTRAVENOUS at 20:17

## 2017-02-04 NOTE — PROGRESS NOTES
[facility] Progress Note      NAME:  Fernanda Zeng. :   1950   MRN:   [id number]   PCP:  None  CARD:   Dr. Venessa Chandler    Date:  2017     Fernanda Oates is a 61 y.o. male with a who presented for further evaluation of severe CAD and systolic heart failure. Subjective:   He was initially seen at [facility] 3 years ago and told that he had heart failure with LVEF 30%. He was lost to follow up due to lack of insurance and has not been seen by a physician in the interim. He complains of progressive fatigue, NAVARRO, PND, and edema over the past year, prompting presentation to [facility]. Pt c/o upper abdominal pain - reports \"it feels like burning across the top of my belly\". Hemodynamics stable    Past 24 hours:  Increased abd. Pain  Ultrasound today - pt deferred last evening b/c wanted to eat dinner  Remains on milrinone gtt    Objective:     Visit Vitals    /73 (BP 1 Location: Right arm, BP Patient Position: Sitting)    Pulse 90    Temp 98 °F (36.7 °C)    Resp 18    Ht 5' 9\" (1.753 m)    Wt 189 lb 9.5 oz (86 kg)    SpO2 99%    BMI 28 kg/m2        Physical Exam:    Gen:  WA, WN, NAD  HEENT:  EOMI  Neck:  (+) JVD  CVS:  S1/S2,  +S3  Pul:  Decreased L base, R side clear  Abd:  Soft,+ tenderness upper extremity to palp. Ext:  1+ b/l LE edema, multiple bandages in place, +erythema  Neuro:  No obvious deficits     1901 -  0700  In: 2947.6 [P.O.:2343; I.V.:604.6]  Out: 3975 [Urine:3975]  O2 Flow Rate (L/min): 2 l/min O2 Device: Room air  Temp (24hrs), Av.1 °F (36.7 °C), Min:97.7 °F (36.5 °C), Max:98.7 °F (37.1 °C)    Past History:     Past Medical History   Diagnosis Date    Cardiomyopathy ([address] 2017     A. Echo (17):  EF 5-10% with severe GHK,. Mildly dil LA. Mild TR. PASP 46. No past surgical history on file.     Social History   Substance Use Topics    Smoking status: Not on file    Smokeless tobacco: Not on file    Alcohol use Not on file        No family history on file. Allergies:   No Known Allergies       Data Review:     CXR:   CXR Results  (Last 48 hours)               02/04/17 0421  XR CHEST PORT Final result    Impression:  IMPRESSION: Worsening left basilar consolidation and increased left basilar   pleural effusion. Narrative:  EXAM:  XR CHEST PORT       INDICATION:  L pleural effusion, HF       COMPARISON:  January 30, 2017       FINDINGS: A portable AP radiograph of the chest was obtained at 0354 hours. Left   PICC line position is unchanged terminating in the superior vena cava. There is   increased opacification at the left mid and lower lung with increased pleural   effusion and basilar consolidation/atelectasis. The right lung is clear. There   is no pneumothorax. The visualized cardiac and mediastinal contours are stable. Echocardiogram:   Echo Results  (Last 48 hours)    None        No results found for this visit on 01/20/17.       ECG: sinus rhythm on telemetry  LABS:  Recent Results (from the past 24 hour(s))   GLUCOSE, POC    Collection Time: 02/03/17 11:42 AM   Result Value Ref Range    Glucose (POC) 182 (H) 65 - 100 mg/dL    Performed by [facility]    SODIUM    Collection Time: 02/03/17 12:44 PM   Result Value Ref Range    Sodium 131 (L) 136 - 145 mmol/L   GLUCOSE, POC    Collection Time: 02/03/17  5:07 PM   Result Value Ref Range    Glucose (POC) 178 (H) 65 - 100 mg/dL    Performed by [facility]    METABOLIC PANEL, COMPREHENSIVE    Collection Time: 02/04/17  4:36 AM   Result Value Ref Range    Sodium 132 (L) 136 - 145 mmol/L    Potassium 4.4 3.5 - 5.1 mmol/L    Chloride 95 (L) 97 - 108 mmol/L    CO2 28 21 - 32 mmol/L    Anion gap 9 5 - 15 mmol/L    Glucose 161 (H) 65 - 100 mg/dL    BUN 49 (H) 6 - 20 MG/DL    Creatinine 1.82 (H) 0.70 - 1.30 MG/DL    BUN/Creatinine ratio 27 (H) 12 - 20      GFR est AA 46 (L) >60 ml/min/1.73m2    GFR est non-AA 38 (L) >60 ml/min/1.73m2 Calcium 8.9 8.5 - 10.1 MG/DL    Bilirubin, total 0.5 0.2 - 1.0 MG/DL    ALT (SGPT) 11 (L) 12 - 78 U/L    AST (SGOT) 25 15 - 37 U/L    Alk. phosphatase 171 (H) 45 - 117 U/L    Protein, total 7.0 6.4 - 8.2 g/dL    Albumin 2.7 (L) 3.5 - 5.0 g/dL    Globulin 4.3 (H) 2.0 - 4.0 g/dL    A-G Ratio 0.6 (L) 1.1 - 2.2     MAGNESIUM    Collection Time: 02/04/17  4:36 AM   Result Value Ref Range    Magnesium 2.1 1.6 - 2.4 mg/dL   CBC W/O DIFF    Collection Time: 02/04/17  4:36 AM   Result Value Ref Range    WBC 5.3 4.1 - 11.1 K/uL    RBC 4.17 4.10 - 5.70 M/uL    HGB 10.1 (L) 12.1 - 17.0 g/dL    HCT 32.3 (L) 36.6 - 50.3 %    MCV 77.5 (L) 80.0 - 99.0 FL    MCH 24.2 (L) 26.0 - 34.0 PG    MCHC 31.3 30.0 - 36.5 g/dL    RDW 16.5 (H) 11.5 - 14.5 %    PLATELET 615 (L) 223 - 400 K/uL   PTT    Collection Time: 02/04/17  4:36 AM   Result Value Ref Range    aPTT 29.4 22.1 - 32.5 sec    aPTT, therapeutic range     58.0 - 77.0 SECS   GLUCOSE, POC    Collection Time: 02/04/17  7:01 AM   Result Value Ref Range    Glucose (POC) 125 (H) 65 - 100 mg/dL    Performed by [facility]      All Micro Results     Procedure Component Value Units Date/Time    CULTURE, BODY FLUID Summer Leighickson STAIN [[id number]] Collected:  01/26/17 1505    Order Status:  Completed Specimen:  Thoracentesis Updated:  01/30/17 5828     Special Requests: NO SPECIAL REQUESTS        GRAM STAIN OCCASIONAL  WBCS SEEN         NO ORGANISMS SEEN        Culture result: NO GROWTH 4 DAYS       CULTURE, Ryan Marvin STAIN [[id number]] Collected:  01/25/17 1798    Order Status:  Completed Specimen:  Leg Updated:  01/27/17 9561     Special Requests: NO SPECIAL REQUESTS        GRAM STAIN RARE  WBCS SEEN         NO ORGANISMS SEEN        Culture result: LIGHT  STREPTOCOCCI, BETA HEMOLYTIC GROUP C  . .. Penicillin and ampicillin are drugs of choice for treatment of beta-hemolytic streptococcal infections.  Susceptibility testing of penicillins and beta-lactams approved by the FDA for treatment of beta-hemolytic streptococcal infections need not be performed routinely, because nonsusceptible isolates are extremely rare. CLSI 2012         MODERATE  BETTIE TROPICALIS             Specimen Source   1: Pleural Fluid   CYTOLOGIC INTERPRETATION:   Scattered mesothelial cells with degenerative changes and mixed inflammatory cells   General Categorization   No cells diagnostic for malignancy   Specimen Adequacy   Satisfactory for evaluation        IMPRESSION  1. Markedly dilated left ventricle with 3-D end-diastolic volume index to body  surface area of 153 mL/sq m. Mild eccentric left ventricular hypertrophy with  3-D mass index to body surface area of 85 g/sq m. Severe left ventricular  systolic dysfunction. Severe global hypokinesis with regional variation. 3-D  LVEF 10%. 2. Moderately dilated right ventricle by 3-D volumetric assessment. RV end  diastolic volume indexed to body surface area of 138 mL/sq m. Moderate to severe  right ventricular systolic dysfunction. Global hypokinesis. 3-D RVEF 25%. 3. Apical a tethered mitral valve leaflets. Mild mitral regurgitation. 4. Moderately severe 3+ tricuspid regurgitation. 5. On LGE study, the anterior wall, anteroseptal wall, anteroapical wall, and  anterior lateral wall are largely viable without significant myocardial  infarction. The entire inferior wall and inferoseptal wall are completely viable  without any infarct. The base to mid inferolateral wall demonstrate a near  transmural greater than 75% thickness infarct with minimal or no viable  myocardium in this territory. The distal inferolateral wall, apical lateral wall  does not demonstrate any infarct and are largely viable. Based on the viability  imaging, the entire LAD territory is largely viable and should recover upon  revascularization. The entire RCA territory is largely viable and should recover  upon revascularization. The LCx territory demonstrate medium-size infarct with  limited viability.   6. Large left-sided pleural effusion with passive atelectasis of the left lung. 7. Dilated branch pulmonary arteries suggest pulmonary hypertension. 8. Markedly dilated left atrium measuring 59 x 55 mm. Moderately dilated right  atrium measuring 46 x 56 mm.     Medications reviewed:    Current Facility-Administered Medications   Medication Dose Route Frequency    bumetanide (BUMEX) tablet 2 mg  2 mg Oral BID    nicotine (NICODERM CQ) 21 mg/24 hr patch 1 Patch  1 Patch TransDERmal DAILY    cephALEXin (KEFLEX) capsule 250 mg  250 mg Oral QID    glimepiride (AMARYL) tablet 4 mg  4 mg Oral ACB    insulin glargine (LANTUS) injection 10 Units  10 Units SubCUTAneous DAILY    gabapentin (NEURONTIN) capsule 100 mg  100 mg Oral BID    milrinone (PRIMACOR) 20 MG/100 ML D5W infusion  0.3 mcg/kg/min IntraVENous CONTINUOUS    carvedilol (COREG) tablet 3.125 mg  3.125 mg Oral BID WITH MEALS    amiodarone (CORDARONE) tablet 200 mg  200 mg Oral Q12H    bacitracin 500 unit/gram packet 1 Packet  1 Packet Topical PRN    atorvastatin (LIPITOR) tablet 10 mg  10 mg Oral DAILY    0.9% sodium chloride infusion  10 mL/hr IntraVENous CONTINUOUS    acetaminophen (TYLENOL) tablet 650 mg  650 mg Oral Q6H PRN    aspirin delayed-release tablet 81 mg  81 mg Oral DAILY    glucagon (GLUCAGEN) injection 1 mg  1 mg IntraMUSCular PRN    glucose chewable tablet 16 g  4 Tab Oral PRN    insulin lispro (HUMALOG) injection   SubCUTAneous AC&HS    magnesium oxide (MAG-OX) tablet 400 mg  400 mg Oral DAILY    0.9% sodium chloride infusion  3 mL/hr IntraVENous CONTINUOUS    sodium chloride (NS) flush 5-10 mL  5-10 mL IntraVENous Q8H    sodium chloride (NS) flush 5-10 mL  5-10 mL IntraVENous PRN    albuterol (PROVENTIL VENTOLIN) nebulizer solution 2.5 mg  2.5 mg Nebulization Q4H PRN    diphenhydrAMINE (BENADRYL) capsule 25 mg  25 mg Oral QHS PRN    oxyCODONE-acetaminophen (PERCOCET) 5-325 mg per tablet 2 Tab  2 Tab Oral Q4H PRN    ELECTROLYTE REPLACEMENT PROTOCOL  1 Each Other PRN     PFTs  FVC 2.62 60% predicted             FEV1 1.51 50% predicted             FEV1/FVC 54.57    Ltd abd ultrasouond - FINDINGS: The gallbladder is moderately distended. The gallbladder wall appears  thickened measuring 5 mm. There is a small amount of pericholecystic fluid. There is tenderness to probe palpation over the gallbladder fossa. No gallstone  is shown. No intrahepatic biliary ductal dilation is demonstrated. Common bile  duct diameter measures 10 mm, similar to that measured previously.     The liver is again shown to be enlarged and diffusely echogenic in texture  though without indication of focal hepatic mass lesion. Portal venous flow is  hepatopedal. The visualized pancreatic head and right kidney appear normal with  right renal length 10.6 cm.     IMPRESSION  IMPRESSION: Distended gallbladder with gallbladder wall thickening,  pericholecystic fluid and tenderness to probe palpation during exam. Findings  are consistent with acalculus cholecystitis. IMPRESSION/Plan:   Acute on chronic systolic heart failure (ICM) - Stage D, NYHA Class IV - Stable, Continue milrinone at   0.3 mcg/kg/min, Continue low dose Coreg and Bumex, Strict I/Os, Replete electrolytes prn, Hold ACE-I due to CHACORTA on CKD. Daily weights - unchanged today    Severe native coronary artery disease w/ viability - on ASA, statin, low dose BB; no ACEi d/t renal dysfunction, tentative plan for high risk PCI w/ Impella support on Tuesday. L pleural effusion - fluid is re-accumulating, had thoracentesis earlier. Follow w/ x-rays - increase diuretics    Acalculus Cholecystitis - check blood cultures, abx changed to Zosyn per ID recommendations - pt would not be good surgical candidate - if necessary would place drain by SASCHA BROOKS - REJI positive, platlets 124 - close f/u    Hyponatremia - sodium improved w/ dose of Tolvaptan, still low - monitor    Diabetes Mellitus - cont.  Current treatment coverage, close monitor of BS. DTC following    Cellulitis - cont. Po abx per ID.   Legs improving    H/o Tobacco abuse - smoking cessation, nicotine patch    CHACORTA on CKD3 - renal following, stable w/ decreased diuretic dose    Anemia - monitor    Pulmonary HTN -          Pt d/w Dr. Manish Barragan and Dr Mark Pelletier    Thank you for letting me see him with you,    KENYETTA Yusuf Roman 1721        Saw patient, agree with above  Risk of morbidity and mortality - high  Medical decision making - high complexity

## 2017-02-04 NOTE — PROGRESS NOTES
[facility] Progress Note      NAME:  Fernanda Lowery :   1950   MRN:   [id number]   PCP:  None  CARD:   Dr. Shade Alford    Date:  2017     Fernanda Lowery is a 61 y.o. male with a who presented for further evaluation of severe CAD and systolic heart failure. Subjective:   He was initially seen at [address] [place] [address] 3 years ago and told that he had heart failure with LVEF 30%. He was lost to follow up due to lack of insurance and has not been seen by a physician in the interim. He complains of progressive fatigue, NAVARRO, PND, and edema over the past year, prompting presentation to [facility]. He also complains of lower extremity bullae, weeping, and pain. He denies palpitations, presyncope, syncope, or chest pain. Past 24 hours:  Denies dyspnea  Complains of fatigue  Tearful when discussing the risks of unprotected left main stent - would like to proceed and rescind his DNR status during the procedure      Objective:     Visit Vitals    /64 (BP 1 Location: Right arm, BP Patient Position: At rest)    Pulse 83    Temp 98 °F (36.7 °C)    Resp 20    Ht 5' 9\" (1.753 m)    Wt 86 kg (189 lb 9.5 oz)    SpO2 94%    BMI 28 kg/m2          General:  fatigued    HEENT: Normocephalic, EOMI, PERRLA, Hearing intact, trachea mid-line    Neck:  supple, no significant adenopathy, carotids upstroke normal bilaterally, no bruits    CVP:  10  cm  ( + ) HJR    Heart:  Diminished PMI    Normal S1 and S2, S3 gallop    Murmur: 2/6 systolic murmur    Lungs: Diminished breath sounds left lower lung    Abdomen: soft, non-tender. Bowel sounds normal. +HSM    Extremity: Mild erythema bilateral lower extremities with 1-2+ pitting edema bilaterally    Neuro: Alert and oriented to person, place, and time; normal strength and tone. Normal symmetric reflexes.  Normal coordination and gait      701 -  1900  In: 3122.6 [P.O.:2518; I.V.:604.6]  Out: 3525 [Urine:3525]  O2 Flow Rate (L/min): 1.5 l/min O2 Device: Nasal cannula  Temp (24hrs), Av.9 °F (36.6 °C), Min:97.5 °F (36.4 °C), Max:98.3 °F (36.8 °C)          Care Plan discussed with:    Comments   Patient x    Family      RN x    Care Manager                    Consultant:          Past History:     Past Medical History   Diagnosis Date    Cardiomyopathy ([address] 2017     A. Echo (17):  EF 5-10% with severe GHK,. Mildly dil LA. Mild TR. PASP 46. No past surgical history on file. Social History   Substance Use Topics    Smoking status: Not on file    Smokeless tobacco: Not on file    Alcohol use Not on file        No family history on file. Allergies:   No Known Allergies       Data Review:     CXR:   CXR Results  (Last 48 hours)    None            Echocardiogram:   Echo Results  (Last 48 hours)    None          No results found for this visit on 17. ECG:  EKG: ST with occasional PVC, NSIVCD, LAE    LABS:  Recent Results (from the past 24 hour(s))   METABOLIC PANEL, COMPREHENSIVE    Collection Time: 17  2:59 AM   Result Value Ref Range    Sodium 129 (L) 136 - 145 mmol/L    Potassium 4.0 3.5 - 5.1 mmol/L    Chloride 92 (L) 97 - 108 mmol/L    CO2 30 21 - 32 mmol/L    Anion gap 7 5 - 15 mmol/L    Glucose 180 (H) 65 - 100 mg/dL    BUN 45 (H) 6 - 20 MG/DL    Creatinine 1.85 (H) 0.70 - 1.30 MG/DL    BUN/Creatinine ratio 24 (H) 12 - 20      GFR est AA 45 (L) >60 ml/min/1.73m2    GFR est non-AA 37 (L) >60 ml/min/1.73m2    Calcium 8.6 8.5 - 10.1 MG/DL    Bilirubin, total 0.5 0.2 - 1.0 MG/DL    ALT (SGPT) 10 (L) 12 - 78 U/L    AST (SGOT) 26 15 - 37 U/L    Alk.  phosphatase 161 (H) 45 - 117 U/L    Protein, total 6.9 6.4 - 8.2 g/dL    Albumin 2.7 (L) 3.5 - 5.0 g/dL    Globulin 4.2 (H) 2.0 - 4.0 g/dL    A-G Ratio 0.6 (L) 1.1 - 2.2     MAGNESIUM    Collection Time: 17  2:59 AM   Result Value Ref Range    Magnesium 2.0 1.6 - 2.4 mg/dL   CBC W/O DIFF    Collection Time: 17  2:59 AM   Result Value Ref Range WBC 7.1 4.1 - 11.1 K/uL    RBC 4.06 (L) 4.10 - 5.70 M/uL    HGB 9.7 (L) 12.1 - 17.0 g/dL    HCT 31.2 (L) 36.6 - 50.3 %    MCV 76.8 (L) 80.0 - 99.0 FL    MCH 23.9 (L) 26.0 - 34.0 PG    MCHC 31.1 30.0 - 36.5 g/dL    RDW 16.4 (H) 11.5 - 14.5 %    PLATELET 890 (L) 245 - 400 K/uL   PTT    Collection Time: 02/03/17  2:59 AM   Result Value Ref Range    aPTT 28.7 22.1 - 32.5 sec    aPTT, therapeutic range     58.0 - 77.0 SECS   GLUCOSE, POC    Collection Time: 02/03/17  6:23 AM   Result Value Ref Range    Glucose (POC) 84 65 - 100 mg/dL    Performed by SAADIA DONALD(N student)    GLUCOSE, POC    Collection Time: 02/03/17 11:42 AM   Result Value Ref Range    Glucose (POC) 182 (H) 65 - 100 mg/dL    Performed by Gorge Villagran    SODIUM    Collection Time: 02/03/17 12:44 PM   Result Value Ref Range    Sodium 131 (L) 136 - 145 mmol/L   GLUCOSE, POC    Collection Time: 02/03/17  5:07 PM   Result Value Ref Range    Glucose (POC) 178 (H) 65 - 100 mg/dL    Performed by Gorge Villagran      All Micro Results     Procedure Component Value Units Date/Time    CULTURE, BODY FLUID Lisa Irving STAIN [[id number]] Collected:  01/26/17 1505    Order Status:  Completed Specimen:  Thoracentesis Updated:  01/30/17 1057     Special Requests: NO SPECIAL REQUESTS        GRAM STAIN OCCASIONAL  WBCS SEEN         NO ORGANISMS SEEN        Culture result: NO GROWTH 4 DAYS       SIENNA, Reynaldo Basurto STAIN [[id number]] Collected:  01/25/17 2134    Order Status:  Completed Specimen:  Leg Updated:  01/27/17 1440     Special Requests: NO SPECIAL REQUESTS        GRAM STAIN RARE  WBCS SEEN         NO ORGANISMS SEEN        Culture result: LIGHT  STREPTOCOCCI, BETA HEMOLYTIC GROUP C  . .. Penicillin and ampicillin are drugs of choice for treatment of beta-hemolytic streptococcal infections.  Susceptibility testing of penicillins and beta-lactams approved by the FDA for treatment of beta-hemolytic streptococcal infections need not be performed routinely, because nonsusceptible isolates are extremely rare. CLSI 2012         MODERATE  BETTIE TROPICALIS             Specimen Source   1: Pleural Fluid   CYTOLOGIC INTERPRETATION:   Scattered mesothelial cells with degenerative changes and mixed inflammatory cells   General Categorization   No cells diagnostic for malignancy   Specimen Adequacy   Satisfactory for evaluation        IMPRESSION  1. Markedly dilated left ventricle with 3-D end-diastolic volume index to body  surface area of 153 mL/sq m. Mild eccentric left ventricular hypertrophy with  3-D mass index to body surface area of 85 g/sq m. Severe left ventricular  systolic dysfunction. Severe global hypokinesis with regional variation. 3-D  LVEF 10%. 2. Moderately dilated right ventricle by 3-D volumetric assessment. RV end  diastolic volume indexed to body surface area of 138 mL/sq m. Moderate to severe  right ventricular systolic dysfunction. Global hypokinesis. 3-D RVEF 25%. 3. Apical a tethered mitral valve leaflets. Mild mitral regurgitation. 4. Moderately severe 3+ tricuspid regurgitation. 5. On LGE study, the anterior wall, anteroseptal wall, anteroapical wall, and  anterior lateral wall are largely viable without significant myocardial  infarction. The entire inferior wall and inferoseptal wall are completely viable  without any infarct. The base to mid inferolateral wall demonstrate a near  transmural greater than 75% thickness infarct with minimal or no viable  myocardium in this territory. The distal inferolateral wall, apical lateral wall  does not demonstrate any infarct and are largely viable. Based on the viability  imaging, the entire LAD territory is largely viable and should recover upon  revascularization. The entire RCA territory is largely viable and should recover  upon revascularization. The LCx territory demonstrate medium-size infarct with  limited viability. 6. Large left-sided pleural effusion with passive atelectasis of the left lung.   7. Dilated branch pulmonary arteries suggest pulmonary hypertension. 8. Markedly dilated left atrium measuring 59 x 55 mm. Moderately dilated right  atrium measuring 46 x 56 mm. Medications reviewed:    Current Facility-Administered Medications   Medication Dose Route Frequency    bumetanide (BUMEX) tablet 2 mg  2 mg Oral BID    nicotine (NICODERM CQ) 21 mg/24 hr patch 1 Patch  1 Patch TransDERmal DAILY    cephALEXin (KEFLEX) capsule 250 mg  250 mg Oral QID    glimepiride (AMARYL) tablet 4 mg  4 mg Oral ACB    insulin glargine (LANTUS) injection 10 Units  10 Units SubCUTAneous DAILY    gabapentin (NEURONTIN) capsule 100 mg  100 mg Oral BID    milrinone (PRIMACOR) 20 MG/100 ML D5W infusion  0.3 mcg/kg/min IntraVENous CONTINUOUS    carvedilol (COREG) tablet 3.125 mg  3.125 mg Oral BID WITH MEALS    amiodarone (CORDARONE) tablet 200 mg  200 mg Oral Q12H    bacitracin 500 unit/gram packet 1 Packet  1 Packet Topical PRN    atorvastatin (LIPITOR) tablet 10 mg  10 mg Oral DAILY    0.9% sodium chloride infusion  10 mL/hr IntraVENous CONTINUOUS    acetaminophen (TYLENOL) tablet 650 mg  650 mg Oral Q6H PRN    aspirin delayed-release tablet 81 mg  81 mg Oral DAILY    glucagon (GLUCAGEN) injection 1 mg  1 mg IntraMUSCular PRN    glucose chewable tablet 16 g  4 Tab Oral PRN    insulin lispro (HUMALOG) injection   SubCUTAneous AC&HS    magnesium oxide (MAG-OX) tablet 400 mg  400 mg Oral DAILY    0.9% sodium chloride infusion  3 mL/hr IntraVENous CONTINUOUS    sodium chloride (NS) flush 5-10 mL  5-10 mL IntraVENous Q8H    sodium chloride (NS) flush 5-10 mL  5-10 mL IntraVENous PRN    albuterol (PROVENTIL VENTOLIN) nebulizer solution 2.5 mg  2.5 mg Nebulization Q4H PRN    diphenhydrAMINE (BENADRYL) capsule 25 mg  25 mg Oral QHS PRN    oxyCODONE-acetaminophen (PERCOCET) 5-325 mg per tablet 2 Tab  2 Tab Oral Q4H PRN    ELECTROLYTE REPLACEMENT PROTOCOL  1 Each Other PRN           IMPRESSION:   1.  Acute on chronic systolic heart failure (ICM) - Stage D, NYHA Class IV  2. Severe native coronary artery disease  3. Diabetes Mellitus, Hga1c 13.5  4. History of tobacco abuse  5. Cellulitis  6. CHACORTA on CKD3  7. Pulmonary HTN  8. Persistent atrial fibrillation  9. Hyponatremia  10. Left pleural effusion       PLAN:   1. Continue  IV milrinone 0.3 mcg/kg/min, Follow I/O, Replete electrolytes, Hold ACE-I due to CHACORTA on CKD. Continue low dose coreg and IV bumex to 2 mg tid  2. Continue ASA, statin, low dose BB; too high risk for CABG d/t low LVEF and diabetes out of control  3. Viable LAD and RCA territory and limited LCx viability - discussed high risk Impella supported Left Main PCI with Mary López and Johan Jackson  4. Discussed risks/benefits including death for left main stent as well as the risk of death with medical therapy alone - encouraged patient to discuss his ACP with his wife and family  11. Lantus and sliding scale insulin, accuchecks, diabetic education  6. IV cefazolin 1 gram q 8 hours  7. Smoking cessation counseling, nicotine patch         Thank you for letting me see him with you,    Roberta C. Marianna Sacks, MD, [facility] [address] Director  Jennifer Roman [address] [place] [address], [facility] E Josephine Norton, [facility]  Office: [phone]  Fax: [phone]  24 hour VAD/HF Pager: [phone]

## 2017-02-04 NOTE — PROGRESS NOTES
0118 - Patient requested pain meds for legs    0219 - Patient resting quietly     Bedside and Verbal shift change report given to Manuel Primrose (oncoming nurse) by Earnest Ryan RN (offgoing nurse). Report included the following information SBAR, Kardex, MAR, Accordion, Recent Results and Cardiac Rhythm NSR BBB.

## 2017-02-04 NOTE — PROGRESS NOTES
Infectious Diseases Progress Note    Antibiotic Summary:  Levaquin  --   Vancomycin  --   Ancef    -- 2/3  Keflex   2/3 -- present      Subjective:     Comfortable at rest without SOB    Objective:     Vitals:   Visit Vitals    /64 (BP 1 Location: Right arm, BP Patient Position: At rest)    Pulse 82    Temp 98.3 °F (36.8 °C)    Resp 18    Ht 5' 9\" (1.753 m)    Wt 86 kg (189 lb 9.5 oz)    SpO2 98%    BMI 28 kg/m2        Tmax:  Temp (24hrs), Av.8 °F (36.6 °C), Min:97.4 °F (36.3 °C), Max:98.3 °F (36.8 °C)      Exam:  General appearance: alert, no distress  Lungs: few rales at bases  Heart: RRR  Abdomen: nontender  Left leg: much improved  Right leg: much improved    IV Lines: Left PICC inserted     Labs:    Recent Labs      17   0259  17   0429  17   0527   WBC  7.1  8.7  5.8   HGB  9.7*  10.0*  10.5*   PLT  103*  113*  99*   BUN  45*  40*  38*   CREA  1.85*  1.96*  1.84*   TBILI  0.5  0.5  0.4   SGOT  26  11*  13*   AP  161*  99  89     Culture right leg ulcers  = group C Streptococcus    Assessment:     1. Bilateral venous stasis disease of the LEs +/- cellulitis: Much better and changed to Keflex today     2. OHD -- IHD/CAD; CHF; pulm HTN     3. CRF with acute exacerbation     4. NIDDM -- new diagnosis     5. Probable COPD -- heavy smoking since age 7      10. Anemia -- HC/MC -- positive family history of colon cancer -- may need GI W/U    Plan:     1.  Continue Keflex 250 mg po qid      Jocelyn Leone MD

## 2017-02-04 NOTE — PROGRESS NOTES
[facility]   [address] [facility], [address] [place] [address], [facility]  Phone: [phone]   [phone]       Nephrology Progress Note  Jenna Mcwilliams.     1950     [id number]  Date of Admission : 1/20/2017 02/04/17    CC: Follow up for CKD/ARF      Assessment and Plan   CHACORTA on CKD :  - CHACORTA likely 2/2 cardiorenal syndrome + IVVD from diuretics   - Cr stable  - cont with current diuresis     CKD :  · Baseline Cr unknown   · Presumed to be 2/2 untreated DM, HTN and CMP at this time     Thrombocytopenia :  -  CECILIA Ab +ve. REJI pending   -  Other possibilities :  Ancef , hypersplenism from alcoholism    Peripheral Neuropathy :  On Gabapentin     Hyponatremia :  - combination of  CHF + SIADH from chronic alcoholism  - No further tolvaptan for now  - daily Na levels for now    Acute on Chronic Systolic CHF w/ severe CMP  - echo with EF 5-10%, severely dilated LV, severe TR  - Multivessel CAD : MRI showed viable myocardium   - on Milrinone  - High PCI planned from 2/7    Cellulitis RLE w/ Pustular lesions : On Ancef per ID    Pulm HTN     Chronic smoker w/VENANCIO -PNA         Interval History:  Seen and examined. Feeling ok. Na better today at 132. No cp, sob, n/v/d, fever or chills. Review of Systems: Pertinent items are noted in HPI.     Current Medications:   Current Facility-Administered Medications   Medication Dose Route Frequency    piperacillin-tazobactam (ZOSYN) 3.375 g in 0.9% sodium chloride (MBP/ADV) 100 mL  3.375 g IntraVENous Q8H    bumetanide (BUMEX) tablet 2 mg  2 mg Oral TID    nicotine (NICODERM CQ) 21 mg/24 hr patch 1 Patch  1 Patch TransDERmal DAILY    glimepiride (AMARYL) tablet 4 mg  4 mg Oral ACB    insulin glargine (LANTUS) injection 10 Units  10 Units SubCUTAneous DAILY    gabapentin (NEURONTIN) capsule 100 mg  100 mg Oral BID    milrinone (PRIMACOR) 20 MG/100 ML D5W infusion  0.3 mcg/kg/min IntraVENous CONTINUOUS    carvedilol (COREG) tablet 3.125 mg  3.125 mg Oral BID WITH MEALS    amiodarone (CORDARONE) tablet 200 mg  200 mg Oral Q12H    bacitracin 500 unit/gram packet 1 Packet  1 Packet Topical PRN    atorvastatin (LIPITOR) tablet 10 mg  10 mg Oral DAILY    0.9% sodium chloride infusion  10 mL/hr IntraVENous CONTINUOUS    acetaminophen (TYLENOL) tablet 650 mg  650 mg Oral Q6H PRN    aspirin delayed-release tablet 81 mg  81 mg Oral DAILY    glucagon (GLUCAGEN) injection 1 mg  1 mg IntraMUSCular PRN    glucose chewable tablet 16 g  4 Tab Oral PRN    insulin lispro (HUMALOG) injection   SubCUTAneous AC&HS    magnesium oxide (MAG-OX) tablet 400 mg  400 mg Oral DAILY    0.9% sodium chloride infusion  3 mL/hr IntraVENous CONTINUOUS    sodium chloride (NS) flush 5-10 mL  5-10 mL IntraVENous Q8H    sodium chloride (NS) flush 5-10 mL  5-10 mL IntraVENous PRN    albuterol (PROVENTIL VENTOLIN) nebulizer solution 2.5 mg  2.5 mg Nebulization Q4H PRN    diphenhydrAMINE (BENADRYL) capsule 25 mg  25 mg Oral QHS PRN    oxyCODONE-acetaminophen (PERCOCET) 5-325 mg per tablet 2 Tab  2 Tab Oral Q4H PRN    ELECTROLYTE REPLACEMENT PROTOCOL  1 Each Other PRN      No Known Allergies    Objective:  Vitals:    Vitals:    02/04/17 0358 02/04/17 0405 02/04/17 0754 02/04/17 1116   BP: 100/65  110/73 115/76   Pulse: 88  90 85   Resp: 18  18 18   Temp: 98.7 °F (37.1 °C)  98 °F (36.7 °C) 97.8 °F (36.6 °C)   SpO2: 99%  99% 100%   Weight:  86 kg (189 lb 9.5 oz)     Height:         Intake and Output:  02/04 0701 - 02/04 1900  In: -   Out: 500 [Urine:500]  02/02 1901 - 02/04 0700  In: 2947.6 [P.O.:2343; I.V.:604.6]  Out: 6620 [Urine:3975]    Physical Examination:  General: Appears stated age  Resp:  Lungs mostly clear  CV:  RRR,  no murmur or rub,+ LE edema  GI:  Soft, distended, NT, + Bowel sounds, no hepatosplenomegaly  Neurologic:  Non focal  Skin:  RLE cellulitis - resolving   :  No mendoza    []    High complexity decision making was performed  []    Patient is at high-risk of decompensation with multiple organ involvement    Lab Data Personally Reviewed: I have reviewed all the pertinent labs, microbiology data and radiology studies during assessment. Recent Labs      02/04/17 0436 02/03/17 1244  02/03/17 0259 02/02/17 0429   NA  132*  131*  129*  133*   K  4.4   --   4.0  4.2   CL  95*   --   92*  97   CO2  28   --   30  29   GLU  161*   --   180*  168*   BUN  49*   --   45*  40*   CREA  1.82*   --   1.85*  1.96*   CA  8.9   --   8.6  9.2   MG  2.1   --   2.0  1.8   ALB  2.7*   --   2.7*  2.6*   SGOT  25   --   26  11*   ALT  11*   --   10*  <6*     Recent Labs      02/04/17 0436 02/03/17 0259 02/02/17 0429   WBC  5.3  7.1  8.7   HGB  10.1*  9.7*  10.0*   HCT  32.3*  31.2*  31.8*   PLT  124*  103*  113*     No results found for: SDES  Lab Results   Component Value Date/Time    Culture result: NO GROWTH 4 DAYS 01/26/2017 03:05 PM    Culture result:  01/25/2017 09:34 PM     LIGHT  STREPTOCOCCI, BETA HEMOLYTIC GROUP C  . .. Penicillin and ampicillin are drugs of choice for treatment of beta-hemolytic streptococcal infections. Susceptibility testing of penicillins and beta-lactams approved by the FDA for treatment of beta-hemolytic streptococcal infections need not be performed routinely, because nonsusceptible isolates are extremely rare.  CLSI 2012      Culture result: MODERATE  CANDIDA TROPICALIS   01/25/2017 09:34 PM    Culture result: NO SIGNIFICANT GROWTH 01/19/2017 01:35 AM     Recent Results (from the past 24 hour(s))   GLUCOSE, POC    Collection Time: 02/03/17 11:42 AM   Result Value Ref Range    Glucose (POC) 182 (H) 65 - 100 mg/dL    Performed by Hal Michelle    SODIUM    Collection Time: 02/03/17 12:44 PM   Result Value Ref Range    Sodium 131 (L) 136 - 145 mmol/L   GLUCOSE, POC    Collection Time: 02/03/17  5:07 PM   Result Value Ref Range    Glucose (POC) 178 (H) 65 - 100 mg/dL    Performed by [facility], COMPREHENSIVE    Collection Time: 02/04/17  4:36 AM   Result Value Ref Range    Sodium 132 (L) 136 - 145 mmol/L    Potassium 4.4 3.5 - 5.1 mmol/L    Chloride 95 (L) 97 - 108 mmol/L    CO2 28 21 - 32 mmol/L    Anion gap 9 5 - 15 mmol/L    Glucose 161 (H) 65 - 100 mg/dL    BUN 49 (H) 6 - 20 MG/DL    Creatinine 1.82 (H) 0.70 - 1.30 MG/DL    BUN/Creatinine ratio 27 (H) 12 - 20      GFR est AA 46 (L) >60 ml/min/1.73m2    GFR est non-AA 38 (L) >60 ml/min/1.73m2    Calcium 8.9 8.5 - 10.1 MG/DL    Bilirubin, total 0.5 0.2 - 1.0 MG/DL    ALT (SGPT) 11 (L) 12 - 78 U/L    AST (SGOT) 25 15 - 37 U/L    Alk. phosphatase 171 (H) 45 - 117 U/L    Protein, total 7.0 6.4 - 8.2 g/dL    Albumin 2.7 (L) 3.5 - 5.0 g/dL    Globulin 4.3 (H) 2.0 - 4.0 g/dL    A-G Ratio 0.6 (L) 1.1 - 2.2     MAGNESIUM    Collection Time: 02/04/17  4:36 AM   Result Value Ref Range    Magnesium 2.1 1.6 - 2.4 mg/dL   CBC W/O DIFF    Collection Time: 02/04/17  4:36 AM   Result Value Ref Range    WBC 5.3 4.1 - 11.1 K/uL    RBC 4.17 4.10 - 5.70 M/uL    HGB 10.1 (L) 12.1 - 17.0 g/dL    HCT 32.3 (L) 36.6 - 50.3 %    MCV 77.5 (L) 80.0 - 99.0 FL    MCH 24.2 (L) 26.0 - 34.0 PG    MCHC 31.3 30.0 - 36.5 g/dL    RDW 16.5 (H) 11.5 - 14.5 %    PLATELET 246 (L) 691 - 400 K/uL   PTT    Collection Time: 02/04/17  4:36 AM   Result Value Ref Range    aPTT 29.4 22.1 - 32.5 sec    aPTT, therapeutic range     58.0 - 77.0 SECS   GLUCOSE, POC    Collection Time: 02/04/17  7:01 AM   Result Value Ref Range    Glucose (POC) 125 (H) 65 - 100 mg/dL    Performed by [address] [place] [address], POC    Collection Time: 02/04/17 11:19 AM   Result Value Ref Range    Glucose (POC) 181 (H) 65 - 100 mg/dL    Performed by [facility]            I have reviewed the flowsheets. Chart and Pertinent Notes have been reviewed. No change in PMH ,family and social history from Consult note.       Lizy Gracia MD

## 2017-02-04 NOTE — PROGRESS NOTES
Problem: Heart Failure: Day 5  Goal: Activity/Safety  Outcome: Progressing Towards Goal  Sitting on side of bed;has not ambulated in hallway  Goal: Diagnostic Test/Procedures  Outcome: Progressing Towards Goal  For possible diagnostic/interventional cath early next week  Goal: Nutrition/Diet  Outcome: Progressing Towards Goal  Cooperating with [facility] Of [address] [place] [address]  Goal: Treatments/Interventions/Procedures  Outcome: Progressing Towards Goal  Blood sugar monitoring continues and Pt participating  In using lancet to obtain sample

## 2017-02-05 ENCOUNTER — APPOINTMENT (OUTPATIENT)
Dept: GENERAL RADIOLOGY | Age: 67
DRG: 215 | End: 2017-02-05
Attending: PHYSICIAN ASSISTANT
Payer: SELF-PAY

## 2017-02-05 LAB
ALBUMIN SERPL BCP-MCNC: 2.8 G/DL (ref 3.5–5)
ALBUMIN/GLOB SERPL: 0.7 {RATIO} (ref 1.1–2.2)
ALP SERPL-CCNC: 175 U/L (ref 45–117)
ALT SERPL-CCNC: 17 U/L (ref 12–78)
ANION GAP BLD CALC-SCNC: 10 MMOL/L (ref 5–15)
APTT PPP: 29.3 SEC (ref 22.1–32.5)
AST SERPL W P-5'-P-CCNC: 24 U/L (ref 15–37)
BILIRUB SERPL-MCNC: 0.5 MG/DL (ref 0.2–1)
BUN SERPL-MCNC: 50 MG/DL (ref 6–20)
BUN/CREAT SERPL: 27 (ref 12–20)
CALCIUM SERPL-MCNC: 9 MG/DL (ref 8.5–10.1)
CHLORIDE SERPL-SCNC: 97 MMOL/L (ref 97–108)
CO2 SERPL-SCNC: 29 MMOL/L (ref 21–32)
CREAT SERPL-MCNC: 1.86 MG/DL (ref 0.7–1.3)
ERYTHROCYTE [DISTWIDTH] IN BLOOD BY AUTOMATED COUNT: 16.4 % (ref 11.5–14.5)
GLOBULIN SER CALC-MCNC: 4.3 G/DL (ref 2–4)
GLUCOSE BLD STRIP.AUTO-MCNC: 119 MG/DL (ref 65–100)
GLUCOSE BLD STRIP.AUTO-MCNC: 148 MG/DL (ref 65–100)
GLUCOSE BLD STRIP.AUTO-MCNC: 163 MG/DL (ref 65–100)
GLUCOSE BLD STRIP.AUTO-MCNC: 163 MG/DL (ref 65–100)
GLUCOSE SERPL-MCNC: 130 MG/DL (ref 65–100)
HCT VFR BLD AUTO: 32.7 % (ref 36.6–50.3)
HGB BLD-MCNC: 10.2 G/DL (ref 12.1–17)
MAGNESIUM SERPL-MCNC: 2.1 MG/DL (ref 1.6–2.4)
MCH RBC QN AUTO: 24.2 PG (ref 26–34)
MCHC RBC AUTO-ENTMCNC: 31.2 G/DL (ref 30–36.5)
MCV RBC AUTO: 77.5 FL (ref 80–99)
PLATELET # BLD AUTO: 153 K/UL (ref 150–400)
POTASSIUM SERPL-SCNC: 4.2 MMOL/L (ref 3.5–5.1)
PROT SERPL-MCNC: 7.1 G/DL (ref 6.4–8.2)
RBC # BLD AUTO: 4.22 M/UL (ref 4.1–5.7)
SERVICE CMNT-IMP: ABNORMAL
SODIUM SERPL-SCNC: 136 MMOL/L (ref 136–145)
THERAPEUTIC RANGE,PTTT: NORMAL SECS (ref 58–77)
WBC # BLD AUTO: 5.6 K/UL (ref 4.1–11.1)

## 2017-02-05 PROCEDURE — 74011250636 HC RX REV CODE- 250/636: Performed by: PHYSICIAN ASSISTANT

## 2017-02-05 PROCEDURE — 83735 ASSAY OF MAGNESIUM: CPT | Performed by: PHYSICIAN ASSISTANT

## 2017-02-05 PROCEDURE — 87040 BLOOD CULTURE FOR BACTERIA: CPT | Performed by: PHYSICIAN ASSISTANT

## 2017-02-05 PROCEDURE — 74011250637 HC RX REV CODE- 250/637: Performed by: THORACIC SURGERY (CARDIOTHORACIC VASCULAR SURGERY)

## 2017-02-05 PROCEDURE — 74011250637 HC RX REV CODE- 250/637: Performed by: PHYSICIAN ASSISTANT

## 2017-02-05 PROCEDURE — 82962 GLUCOSE BLOOD TEST: CPT

## 2017-02-05 PROCEDURE — 74011250637 HC RX REV CODE- 250/637: Performed by: INTERNAL MEDICINE

## 2017-02-05 PROCEDURE — 65660000000 HC RM CCU STEPDOWN

## 2017-02-05 PROCEDURE — 74011250636 HC RX REV CODE- 250/636: Performed by: INTERNAL MEDICINE

## 2017-02-05 PROCEDURE — 74011250637 HC RX REV CODE- 250/637: Performed by: NURSE PRACTITIONER

## 2017-02-05 PROCEDURE — 85730 THROMBOPLASTIN TIME PARTIAL: CPT | Performed by: PHYSICIAN ASSISTANT

## 2017-02-05 PROCEDURE — 85027 COMPLETE CBC AUTOMATED: CPT | Performed by: PHYSICIAN ASSISTANT

## 2017-02-05 PROCEDURE — 36415 COLL VENOUS BLD VENIPUNCTURE: CPT | Performed by: PHYSICIAN ASSISTANT

## 2017-02-05 PROCEDURE — 74011636637 HC RX REV CODE- 636/637: Performed by: PHYSICIAN ASSISTANT

## 2017-02-05 PROCEDURE — 71010 XR CHEST PORT: CPT

## 2017-02-05 PROCEDURE — 74011000258 HC RX REV CODE- 258: Performed by: PHYSICIAN ASSISTANT

## 2017-02-05 PROCEDURE — 77010033678 HC OXYGEN DAILY

## 2017-02-05 PROCEDURE — 80053 COMPREHEN METABOLIC PANEL: CPT | Performed by: PHYSICIAN ASSISTANT

## 2017-02-05 RX ORDER — DOCUSATE SODIUM 100 MG/1
100 CAPSULE, LIQUID FILLED ORAL
Status: DISCONTINUED | OUTPATIENT
Start: 2017-02-05 | End: 2017-02-22 | Stop reason: HOSPADM

## 2017-02-05 RX ADMIN — CARVEDILOL 3.12 MG: 3.12 TABLET, FILM COATED ORAL at 17:27

## 2017-02-05 RX ADMIN — PIPERACILLIN AND TAZOBACTAM 3.38 G: 3; .375 INJECTION, POWDER, FOR SOLUTION INTRAVENOUS at 14:16

## 2017-02-05 RX ADMIN — MILRINONE LACTATE 0.3 MCG/KG/MIN: 200 INJECTION, SOLUTION INTRAVENOUS at 23:43

## 2017-02-05 RX ADMIN — OXYCODONE HYDROCHLORIDE AND ACETAMINOPHEN 2 TABLET: 5; 325 TABLET ORAL at 21:42

## 2017-02-05 RX ADMIN — GABAPENTIN 100 MG: 100 CAPSULE ORAL at 09:03

## 2017-02-05 RX ADMIN — AMIODARONE HYDROCHLORIDE 200 MG: 200 TABLET ORAL at 09:03

## 2017-02-05 RX ADMIN — ATORVASTATIN CALCIUM 10 MG: 10 TABLET, FILM COATED ORAL at 09:03

## 2017-02-05 RX ADMIN — BUMETANIDE 2 MG: 1 TABLET ORAL at 17:27

## 2017-02-05 RX ADMIN — Medication 400 MG: at 09:03

## 2017-02-05 RX ADMIN — OXYCODONE HYDROCHLORIDE AND ACETAMINOPHEN 2 TABLET: 5; 325 TABLET ORAL at 00:09

## 2017-02-05 RX ADMIN — PIPERACILLIN AND TAZOBACTAM 3.38 G: 3; .375 INJECTION, POWDER, FOR SOLUTION INTRAVENOUS at 05:41

## 2017-02-05 RX ADMIN — Medication 10 ML: at 21:35

## 2017-02-05 RX ADMIN — BUMETANIDE 2 MG: 1 TABLET ORAL at 09:03

## 2017-02-05 RX ADMIN — BUMETANIDE 2 MG: 1 TABLET ORAL at 21:31

## 2017-02-05 RX ADMIN — PIPERACILLIN AND TAZOBACTAM 3.38 G: 3; .375 INJECTION, POWDER, FOR SOLUTION INTRAVENOUS at 21:32

## 2017-02-05 RX ADMIN — Medication 10 ML: at 06:45

## 2017-02-05 RX ADMIN — AMIODARONE HYDROCHLORIDE 200 MG: 200 TABLET ORAL at 21:31

## 2017-02-05 RX ADMIN — CARVEDILOL 3.12 MG: 3.12 TABLET, FILM COATED ORAL at 09:03

## 2017-02-05 RX ADMIN — OXYCODONE HYDROCHLORIDE AND ACETAMINOPHEN 2 TABLET: 5; 325 TABLET ORAL at 14:24

## 2017-02-05 RX ADMIN — INSULIN GLARGINE 10 UNITS: 100 INJECTION, SOLUTION SUBCUTANEOUS at 09:03

## 2017-02-05 RX ADMIN — GABAPENTIN 100 MG: 100 CAPSULE ORAL at 17:28

## 2017-02-05 RX ADMIN — DOCUSATE SODIUM 100 MG: 100 CAPSULE, LIQUID FILLED ORAL at 17:28

## 2017-02-05 RX ADMIN — Medication 10 ML: at 14:00

## 2017-02-05 RX ADMIN — Medication 81 MG: at 09:03

## 2017-02-05 RX ADMIN — OXYCODONE HYDROCHLORIDE AND ACETAMINOPHEN 2 TABLET: 5; 325 TABLET ORAL at 05:40

## 2017-02-05 RX ADMIN — MILRINONE LACTATE 0.3 MCG/KG/MIN: 200 INJECTION, SOLUTION INTRAVENOUS at 10:28

## 2017-02-05 RX ADMIN — GLIMEPIRIDE 4 MG: 2 TABLET ORAL at 06:45

## 2017-02-05 RX ADMIN — OXYCODONE HYDROCHLORIDE AND ACETAMINOPHEN 2 TABLET: 5; 325 TABLET ORAL at 10:28

## 2017-02-05 NOTE — PROGRESS NOTES
2330: Bedside shift change report [facility] [address] [facility]coming nurse[facility] by Stefany Montejo RN (offgoing nurse). Report included the following information SBAR, Intake/Output, MAR, Recent Results and Cardiac Rhythm NSR. Care assumed of pt, pt reports pain of 6/10 in lower legs/feet. Pt requested pain medication and a popsicle. 0009: Pt given popsicle and diet ginger ale with percocet. 7422: Pt awake, called out for urinal to be emptied. Antibiotic completed. Morning labs and paired blood cultures drawn on pt. Pt given cereal and milk. 0730: Bedside shift change report given to [address] [place] [address] (oncoming nurse) by [address] (offgoing nurse). Report included the following information SBAR, Intake/Output, MAR, Recent Results and Cardiac Rhythm NSR.

## 2017-02-05 NOTE — PROGRESS NOTES
Infectious Diseases Progress Note    Antibiotic Summary:  Levaquin  --   Vancomycin  --   Ancef    -- 2/3  Keflex   2/3 --   Zosyn   -- present      Subjective:     He notes RUQ pain and tenderness but says this has been present for years and has not recently increased or changed    Objective:     Vitals:   Visit Vitals    /72 (BP 1 Location: Right arm, BP Patient Position: Sitting)    Pulse 81    Temp 97.8 °F (36.6 °C)    Resp 18    Ht 5' 9\" (1.753 m)    Wt 86 kg (189 lb 9.5 oz)    SpO2 98%    BMI 28 kg/m2        Tmax:  Temp (24hrs), Av °F (36.7 °C), Min:97.6 °F (36.4 °C), Max:98.7 °F (37.1 °C)      Exam:  General appearance: alert, no distress  Lungs: few rales at bases  Heart: RRR  Abdomen: tender RUQ  Left leg: much improved  Right leg: much improved    IV Lines: Left PICC inserted     Labs:    Recent Labs      17   0436  17   0259  17   0429   WBC  5.3  7.1  8.7   HGB  10.1*  9.7*  10.0*   PLT  124*  103*  113*   BUN  49*  45*  40*   CREA  1.82*  1.85*  1.96*   TBILI  0.5  0.5  0.5   SGOT  25  26  11*   AP  171*  161*  99     Culture right leg ulcers  = group C Streptococcus    US abdomen 2/3 = \"Distended gallbladder with gallbladder wall thickening,  pericholecystic fluid and tenderness to probe palpation during exam. Findings  are consistent with acalculus cholecystitis\"    Assessment:     1. Bilateral venous stasis disease of the LEs +/- cellulitis: Much better     2. RUQ tenderness and abnormal GB US: Clinically, he says the symptoms are unchanged for months or even years     3. OHD -- IHD/CAD; CHF; pulm HTN     4. CRF with acute exacerbation     5. NIDDM -- new diagnosis     6. Probable COPD -- heavy smoking since age 7      9. Anemia -- HC/MC -- positive family history of colon cancer -- may need GI W/U    Plan:     1. Changed to Zosyn due to concerns for possible cholecystitis    2.  Cholecystosomy tube would be a potential option      Frances Pulido MD

## 2017-02-05 NOTE — PROGRESS NOTES
[facility]   [address] [facility], [facility] [address], [facility]  Phone: [phone]   [phone]       Nephrology Progress Note  Josh Jeffrey.     1950     [id number]  Date of Admission : 1/20/2017 02/05/17    CC: Follow up for CKD/ARF      Assessment and Plan   CHACORTA on CKD :  - CHACORTA likely 2/2 cardiorenal syndrome + IVVD from diuretics   - Cr stable  - cont with current care for now  - daily labs     CKD :  · Baseline Cr unknown   · Presumed to be 2/2 untreated DM, HTN and CMP at this time     Thrombocytopenia :  -  CECILIA Ab +ve. REJI pending   -  Other possibilities :  Ancef , hypersplenism from alcoholism    Peripheral Neuropathy :  On Gabapentin     Hyponatremia :  - combination of  CHF + SIADH from chronic alcoholism  - Na stable at 136 this AM  - No further tolvaptan for now  - daily Na levels for now    Acute on Chronic Systolic CHF w/ severe CMP  - echo with EF 5-10%, severely dilated LV, severe TR  - Multivessel CAD : MRI showed viable myocardium   - on Milrinone  - High PCI planned from 2/7    Cellulitis RLE w/ Pustular lesions : On Ancef per ID    Pulm HTN     Chronic smoker w/VENANCIO -PNA         Interval History:  Seen and examined. Feeling ok. Na better today at 136. UOP > 4 liters, net neg 3 liters in the past 24 hours. No cp, sob, n/v/d, fever or chills. Review of Systems: Pertinent items are noted in HPI.     Current Medications:   Current Facility-Administered Medications   Medication Dose Route Frequency    piperacillin-tazobactam (ZOSYN) 3.375 g in 0.9% sodium chloride (MBP/ADV) 100 mL  3.375 g IntraVENous Q8H    bumetanide (BUMEX) tablet 2 mg  2 mg Oral TID    nicotine (NICODERM CQ) 21 mg/24 hr patch 1 Patch  1 Patch TransDERmal DAILY    glimepiride (AMARYL) tablet 4 mg  4 mg Oral ACB    insulin glargine (LANTUS) injection 10 Units  10 Units SubCUTAneous DAILY    gabapentin (NEURONTIN) capsule 100 mg  100 mg Oral BID    milrinone (PRIMACOR) 20 MG/100 ML D5W infusion  0.3 mcg/kg/min IntraVENous CONTINUOUS    carvedilol (COREG) tablet 3.125 mg  3.125 mg Oral BID WITH MEALS    amiodarone (CORDARONE) tablet 200 mg  200 mg Oral Q12H    bacitracin 500 unit/gram packet 1 Packet  1 Packet Topical PRN    atorvastatin (LIPITOR) tablet 10 mg  10 mg Oral DAILY    0.9% sodium chloride infusion  10 mL/hr IntraVENous CONTINUOUS    acetaminophen (TYLENOL) tablet 650 mg  650 mg Oral Q6H PRN    aspirin delayed-release tablet 81 mg  81 mg Oral DAILY    glucagon (GLUCAGEN) injection 1 mg  1 mg IntraMUSCular PRN    glucose chewable tablet 16 g  4 Tab Oral PRN    insulin lispro (HUMALOG) injection   SubCUTAneous AC&HS    magnesium oxide (MAG-OX) tablet 400 mg  400 mg Oral DAILY    0.9% sodium chloride infusion  3 mL/hr IntraVENous CONTINUOUS    sodium chloride (NS) flush 5-10 mL  5-10 mL IntraVENous Q8H    sodium chloride (NS) flush 5-10 mL  5-10 mL IntraVENous PRN    albuterol (PROVENTIL VENTOLIN) nebulizer solution 2.5 mg  2.5 mg Nebulization Q4H PRN    diphenhydrAMINE (BENADRYL) capsule 25 mg  25 mg Oral QHS PRN    oxyCODONE-acetaminophen (PERCOCET) 5-325 mg per tablet 2 Tab  2 Tab Oral Q4H PRN    ELECTROLYTE REPLACEMENT PROTOCOL  1 Each Other PRN      No Known Allergies    Objective:  Vitals:    Vitals:    02/04/17 2300 02/04/17 2319 02/05/17 0305 02/05/17 0836   BP:  110/64 120/66 107/59   Pulse: 85 83 86 79   Resp:  18 18 18   Temp:  97.9 °F (36.6 °C) 97.9 °F (36.6 °C) 98 °F (36.7 °C)   SpO2:  100% 100% 97%   Weight:   86.3 kg (190 lb 4.1 oz)    Height:         Intake and Output:     02/03 1901 - 02/05 0700  In: 2106.1 [P.O.:1625;  I.V.:481.1]  Out: 5650 [Urine:5650]    Physical Examination:  General: Appears stated age  Resp:  Lungs mostly clear  CV:  RRR,  no murmur or rub,+ LE edema  GI:  Soft, distended, NT, + Bowel sounds, no hepatosplenomegaly  Neurologic:  Non focal  Skin:  RLE cellulitis - resolving   :  No mendoza    []    High complexity decision making was performed  []    Patient is at high-risk of decompensation with multiple organ involvement    Lab Data Personally Reviewed: I have reviewed all the pertinent labs, microbiology data and radiology studies during assessment. Recent Labs      02/05/17 0322 02/04/17 0436 02/03/17   1244  02/03/17 0259   NA  136  132*  131*  129*   K  4.2  4.4   --   4.0   CL  97  95*   --   92*   CO2  29  28   --   30   GLU  130*  161*   --   180*   BUN  50*  49*   --   45*   CREA  1.86*  1.82*   --   1.85*   CA  9.0  8.9   --   8.6   MG  2.1  2.1   --   2.0   ALB  2.8*  2.7*   --   2.7*   SGOT  24  25   --   26   ALT  17  11*   --   10*     Recent Labs      02/05/17 0322 02/04/17 0436 02/03/17   0259   WBC  5.6  5.3  7.1   HGB  10.2*  10.1*  9.7*   HCT  32.7*  32.3*  31.2*   PLT  153  124*  103*     No results found for: SDES  Lab Results   Component Value Date/Time    Culture result: NO GROWTH 4 DAYS 01/26/2017 03:05 PM    Culture result:  01/25/2017 09:34 PM     LIGHT  STREPTOCOCCI, BETA HEMOLYTIC GROUP C  . .. Penicillin and ampicillin are drugs of choice for treatment of beta-hemolytic streptococcal infections. Susceptibility testing of penicillins and beta-lactams approved by the FDA for treatment of beta-hemolytic streptococcal infections need not be performed routinely, because nonsusceptible isolates are extremely rare.  CLSI 2012      Culture result: MODERATE  CANDIDA TROPICALIS   01/25/2017 09:34 PM    Culture result: NO SIGNIFICANT GROWTH 01/19/2017 01:35 AM     Recent Results (from the past 24 hour(s))   GLUCOSE, POC    Collection Time: 02/04/17 11:19 AM   Result Value Ref Range    Glucose (POC) 181 (H) 65 - 100 mg/dL    Performed by Gilford Neer, POC    Collection Time: 02/04/17  4:41 PM   Result Value Ref Range    Glucose (POC) 201 (H) 65 - 100 mg/dL    Performed by [address] [place] [address], POC    Collection Time: 02/04/17  9:46 PM   Result Value Ref Range    Glucose (POC) 130 (H) 65 - 100 mg/dL    Performed by FERN Espinal    Collection Time: 02/05/17  3:22 AM   Result Value Ref Range    Sodium 136 136 - 145 mmol/L    Potassium 4.2 3.5 - 5.1 mmol/L    Chloride 97 97 - 108 mmol/L    CO2 29 21 - 32 mmol/L    Anion gap 10 5 - 15 mmol/L    Glucose 130 (H) 65 - 100 mg/dL    BUN 50 (H) 6 - 20 MG/DL    Creatinine 1.86 (H) 0.70 - 1.30 MG/DL    BUN/Creatinine ratio 27 (H) 12 - 20      GFR est AA 45 (L) >60 ml/min/1.73m2    GFR est non-AA 37 (L) >60 ml/min/1.73m2    Calcium 9.0 8.5 - 10.1 MG/DL    Bilirubin, total 0.5 0.2 - 1.0 MG/DL    ALT (SGPT) 17 12 - 78 U/L    AST (SGOT) 24 15 - 37 U/L    Alk. phosphatase 175 (H) 45 - 117 U/L    Protein, total 7.1 6.4 - 8.2 g/dL    Albumin 2.8 (L) 3.5 - 5.0 g/dL    Globulin 4.3 (H) 2.0 - 4.0 g/dL    A-G Ratio 0.7 (L) 1.1 - 2.2     MAGNESIUM    Collection Time: 02/05/17  3:22 AM   Result Value Ref Range    Magnesium 2.1 1.6 - 2.4 mg/dL   CBC W/O DIFF    Collection Time: 02/05/17  3:22 AM   Result Value Ref Range    WBC 5.6 4.1 - 11.1 K/uL    RBC 4.22 4.10 - 5.70 M/uL    HGB 10.2 (L) 12.1 - 17.0 g/dL    HCT 32.7 (L) 36.6 - 50.3 %    MCV 77.5 (L) 80.0 - 99.0 FL    MCH 24.2 (L) 26.0 - 34.0 PG    MCHC 31.2 30.0 - 36.5 g/dL    RDW 16.4 (H) 11.5 - 14.5 %    PLATELET 882 670 - 481 K/uL   PTT    Collection Time: 02/05/17  3:22 AM   Result Value Ref Range    aPTT 29.3 22.1 - 32.5 sec    aPTT, therapeutic range     58.0 - 77.0 SECS   GLUCOSE, POC    Collection Time: 02/05/17  6:40 AM   Result Value Ref Range    Glucose (POC) 163 (H) 65 - 100 mg/dL    Performed by Harman Chaparro I have reviewed the flowsheets. Chart and Pertinent Notes have been reviewed. No change in PMH ,family and social history from Consult note.       Mario Orellana MD

## 2017-02-05 NOTE — PROGRESS NOTES
[facility] Progress Note      NAME:  Fernanda Sadler. :   1950   MRN:   [id number]   PCP:  None  CARD:   Dr. Morgan Sheldon    Date:  2017     Fernanda rGoss is a 61 y.o. male with a who presented for further evaluation of severe CAD and systolic heart failure. Subjective:   He was initially seen at [facility] 3 years ago and told that he had heart failure with LVEF 30%. He was lost to follow up due to lack of insurance and has not been seen by a physician in the interim. He complains of progressive fatigue, NAVARRO, PND, and edema over the past year, prompting presentation to [facility]. Pt c/o upper abdominal pain - reports \"it feels like burning across the top of my belly\". Abd. pain slightly worse than yesterday - states it's worse after eating. +BM yesterday. Pt denies CP or SOB. Past 24 hours:  Slightly increased abd. Pain  Remains on milrinone gtt    Objective:     Visit Vitals    /69 (BP 1 Location: Right arm, BP Patient Position: At rest)    Pulse 80    Temp 98 °F (36.7 °C)    Resp 16    Ht 5' 9\" (1.753 m)    Wt 190 lb 4.1 oz (86.3 kg)    SpO2 97%    BMI 28.1 kg/m2        Physical Exam:    Gen:  WA, WN, NAD  HEENT:  EOMI  Neck:  (+) JVD  CVS:  S1/S2,  +S3  Pul:  Decreased L base, R side clear  Abd:  Soft,+ tenderness upper quad. to palp. Increased from yesterday, +BS  Ext:  1+ b/l LE edema, multiple bandages in place, +erythema  Neuro:  No obvious deficits    1901 -  0700  In: 2106.1 [P.O.:1625; I.V.:481.1]  Out: 5650 [Urine:5650]  O2 Flow Rate (L/min): 2 l/min O2 Device: Nasal cannula  Temp (24hrs), Av.8 °F (36.6 °C), Min:97.6 °F (36.4 °C), Max:98 °F (36.7 °C)    Past History:     Past Medical History   Diagnosis Date    Cardiomyopathy ([address] 2017     A. Echo (17):  EF 5-10% with severe GHK,. Mildly dil LA. Mild TR. PASP 46. No past surgical history on file.     Social History   Substance Use Topics    Smoking status: Not on file    Smokeless tobacco: Not on file    Alcohol use Not on file        No family history on file. Allergies:   No Known Allergies       Data Review:     CXR:   CXR Results  (Last 48 hours)               02/05/17 0425  XR CHEST PORT Final result    Impression:  IMPRESSION: No significant change. Narrative:  INDICATION:  Chest tube       EXAM: CXR Portable. FINDINGS: Portable chest shows stable appearance including PICC line since   yesterday. There is no apparent pneumothorax. Lungs show left base   consolidation. Heart size is large. There is no overt pulmonary edema. 02/04/17 0421  XR CHEST PORT Final result    Impression:  IMPRESSION: Worsening left basilar consolidation and increased left basilar   pleural effusion. Narrative:  EXAM:  XR CHEST PORT       INDICATION:  L pleural effusion, HF       COMPARISON:  January 30, 2017       FINDINGS: A portable AP radiograph of the chest was obtained at 0354 hours. Left   PICC line position is unchanged terminating in the superior vena cava. There is   increased opacification at the left mid and lower lung with increased pleural   effusion and basilar consolidation/atelectasis. The right lung is clear. There   is no pneumothorax. The visualized cardiac and mediastinal contours are stable. Echocardiogram:   Echo Results  (Last 48 hours)    None        No results found for this visit on 01/20/17.       ECG: sinus rhythm on telemetry  LABS:  Recent Results (from the past 24 hour(s))   GLUCOSE, POC    Collection Time: 02/04/17  4:41 PM   Result Value Ref Range    Glucose (POC) 201 (H) 65 - 100 mg/dL    Performed by [address] [place] [address], POC    Collection Time: 02/04/17  9:46 PM   Result Value Ref Range    Glucose (POC) 130 (H) 65 - 100 mg/dL    Performed by FERN Espinal    Collection Time: 02/05/17  3:22 AM   Result Value Ref Range    Sodium 136 136 - 145 mmol/L    Potassium 4.2 3.5 - 5.1 mmol/L    Chloride 97 97 - 108 mmol/L    CO2 29 21 - 32 mmol/L    Anion gap 10 5 - 15 mmol/L    Glucose 130 (H) 65 - 100 mg/dL    BUN 50 (H) 6 - 20 MG/DL    Creatinine 1.86 (H) 0.70 - 1.30 MG/DL    BUN/Creatinine ratio 27 (H) 12 - 20      GFR est AA 45 (L) >60 ml/min/1.73m2    GFR est non-AA 37 (L) >60 ml/min/1.73m2    Calcium 9.0 8.5 - 10.1 MG/DL    Bilirubin, total 0.5 0.2 - 1.0 MG/DL    ALT (SGPT) 17 12 - 78 U/L    AST (SGOT) 24 15 - 37 U/L    Alk.  phosphatase 175 (H) 45 - 117 U/L    Protein, total 7.1 6.4 - 8.2 g/dL    Albumin 2.8 (L) 3.5 - 5.0 g/dL    Globulin 4.3 (H) 2.0 - 4.0 g/dL    A-G Ratio 0.7 (L) 1.1 - 2.2     MAGNESIUM    Collection Time: 02/05/17  3:22 AM   Result Value Ref Range    Magnesium 2.1 1.6 - 2.4 mg/dL   CBC W/O DIFF    Collection Time: 02/05/17  3:22 AM   Result Value Ref Range    WBC 5.6 4.1 - 11.1 K/uL    RBC 4.22 4.10 - 5.70 M/uL    HGB 10.2 (L) 12.1 - 17.0 g/dL    HCT 32.7 (L) 36.6 - 50.3 %    MCV 77.5 (L) 80.0 - 99.0 FL    MCH 24.2 (L) 26.0 - 34.0 PG    MCHC 31.2 30.0 - 36.5 g/dL    RDW 16.4 (H) 11.5 - 14.5 %    PLATELET 471 260 - 800 K/uL   PTT    Collection Time: 02/05/17  3:22 AM   Result Value Ref Range    aPTT 29.3 22.1 - 32.5 sec    aPTT, therapeutic range     58.0 - 77.0 SECS   GLUCOSE, POC    Collection Time: 02/05/17  6:40 AM   Result Value Ref Range    Glucose (POC) 163 (H) 65 - 100 mg/dL    Performed by Avis May    GLUCOSE, POC    Collection Time: 02/05/17 11:00 AM   Result Value Ref Range    Glucose (POC) 148 (H) 65 - 100 mg/dL    Performed by Mikey Villafana      All Micro Results     Procedure Component Value Units Date/Time    CULTURE, BLOOD, PAIRED [[id number]] Collected:  02/05/17 0322    Order Status:  Completed Specimen:  Blood Updated:  02/05/17 0647    CULTURE, BODY FLUID NIKOLAY Merrillstefanie Man [[id number]] Collected:  01/26/17 1505    Order Status:  Completed Specimen:  Thoracentesis Updated:  01/30/17 1057     Special Requests: NO SPECIAL REQUESTS        GRAM STAIN OCCASIONAL  WBCS SEEN         NO ORGANISMS SEEN        Culture result: NO GROWTH 4 DAYS       CULTURE, Tyra Keys [[id number]] Collected:  01/25/17 2138    Order Status:  Completed Specimen:  Leg Updated:  01/27/17 1440     Special Requests: NO SPECIAL REQUESTS        GRAM STAIN RARE  WBCS SEEN         NO ORGANISMS SEEN        Culture result: LIGHT  STREPTOCOCCI, BETA HEMOLYTIC GROUP C  . .. Penicillin and ampicillin are drugs of choice for treatment of beta-hemolytic streptococcal infections. Susceptibility testing of penicillins and beta-lactams approved by the FDA for treatment of beta-hemolytic streptococcal infections need not be performed routinely, because nonsusceptible isolates are extremely rare. CLSI 2012         MODERATE  BETTIE TROPICALIS             Specimen Source   1: Pleural Fluid   CYTOLOGIC INTERPRETATION:   Scattered mesothelial cells with degenerative changes and mixed inflammatory cells   General Categorization   No cells diagnostic for malignancy   Specimen Adequacy   Satisfactory for evaluation        IMPRESSION  1. Markedly dilated left ventricle with 3-D end-diastolic volume index to body  surface area of 153 mL/sq m. Mild eccentric left ventricular hypertrophy with  3-D mass index to body surface area of 85 g/sq m. Severe left ventricular  systolic dysfunction. Severe global hypokinesis with regional variation. 3-D  LVEF 10%. 2. Moderately dilated right ventricle by 3-D volumetric assessment. RV end  diastolic volume indexed to body surface area of 138 mL/sq m. Moderate to severe  right ventricular systolic dysfunction. Global hypokinesis. 3-D RVEF 25%. 3. Apical a tethered mitral valve leaflets. Mild mitral regurgitation. 4. Moderately severe 3+ tricuspid regurgitation. 5.  On LGE study, the anterior wall, anteroseptal wall, anteroapical wall, and  anterior lateral wall are largely viable without significant myocardial  infarction. The entire inferior wall and inferoseptal wall are completely viable  without any infarct. The base to mid inferolateral wall demonstrate a near  transmural greater than 75% thickness infarct with minimal or no viable  myocardium in this territory. The distal inferolateral wall, apical lateral wall  does not demonstrate any infarct and are largely viable. Based on the viability  imaging, the entire LAD territory is largely viable and should recover upon  revascularization. The entire RCA territory is largely viable and should recover  upon revascularization. The LCx territory demonstrate medium-size infarct with  limited viability. 6. Large left-sided pleural effusion with passive atelectasis of the left lung. 7. Dilated branch pulmonary arteries suggest pulmonary hypertension. 8. Markedly dilated left atrium measuring 59 x 55 mm. Moderately dilated right  atrium measuring 46 x 56 mm.     Medications reviewed:    Current Facility-Administered Medications   Medication Dose Route Frequency    piperacillin-tazobactam (ZOSYN) 3.375 g in 0.9% sodium chloride (MBP/ADV) 100 mL  3.375 g IntraVENous Q8H    bumetanide (BUMEX) tablet 2 mg  2 mg Oral TID    nicotine (NICODERM CQ) 21 mg/24 hr patch 1 Patch  1 Patch TransDERmal DAILY    glimepiride (AMARYL) tablet 4 mg  4 mg Oral ACB    insulin glargine (LANTUS) injection 10 Units  10 Units SubCUTAneous DAILY    gabapentin (NEURONTIN) capsule 100 mg  100 mg Oral BID    milrinone (PRIMACOR) 20 MG/100 ML D5W infusion  0.3 mcg/kg/min IntraVENous CONTINUOUS    carvedilol (COREG) tablet 3.125 mg  3.125 mg Oral BID WITH MEALS    amiodarone (CORDARONE) tablet 200 mg  200 mg Oral Q12H    bacitracin 500 unit/gram packet 1 Packet  1 Packet Topical PRN    atorvastatin (LIPITOR) tablet 10 mg  10 mg Oral DAILY    0.9% sodium chloride infusion  10 mL/hr IntraVENous CONTINUOUS    acetaminophen (TYLENOL) tablet 650 mg  650 mg Oral Q6H PRN    aspirin delayed-release tablet 81 mg  81 mg Oral DAILY    glucagon (GLUCAGEN) injection 1 mg  1 mg IntraMUSCular PRN    glucose chewable tablet 16 g  4 Tab Oral PRN    insulin lispro (HUMALOG) injection   SubCUTAneous AC&HS    magnesium oxide (MAG-OX) tablet 400 mg  400 mg Oral DAILY    0.9% sodium chloride infusion  3 mL/hr IntraVENous CONTINUOUS    sodium chloride (NS) flush 5-10 mL  5-10 mL IntraVENous Q8H    sodium chloride (NS) flush 5-10 mL  5-10 mL IntraVENous PRN    albuterol (PROVENTIL VENTOLIN) nebulizer solution 2.5 mg  2.5 mg Nebulization Q4H PRN    diphenhydrAMINE (BENADRYL) capsule 25 mg  25 mg Oral QHS PRN    oxyCODONE-acetaminophen (PERCOCET) 5-325 mg per tablet 2 Tab  2 Tab Oral Q4H PRN    ELECTROLYTE REPLACEMENT PROTOCOL  1 Each Other PRN     PFTs  FVC 2.62 60% predicted             FEV1 1.51 50% predicted             FEV1/FVC 54.57    Ltd abd ultrasouond - FINDINGS: The gallbladder is moderately distended. The gallbladder wall appears  thickened measuring 5 mm. There is a small amount of pericholecystic fluid. There is tenderness to probe palpation over the gallbladder fossa. No gallstone  is shown. No intrahepatic biliary ductal dilation is demonstrated. Common bile  duct diameter measures 10 mm, similar to that measured previously.     The liver is again shown to be enlarged and diffusely echogenic in texture  though without indication of focal hepatic mass lesion. Portal venous flow is  hepatopedal. The visualized pancreatic head and right kidney appear normal with  right renal length 10.6 cm.     IMPRESSION  IMPRESSION: Distended gallbladder with gallbladder wall thickening,  pericholecystic fluid and tenderness to probe palpation during exam. Findings  are consistent with acalculus cholecystitis.       IMPRESSION/Plan:   Acute on chronic systolic heart failure (ICM) - Stage D, NYHA Class IV - continue Milrinone at   0.3 mcg/kg/min, Continue low dose Coreg and Bumex, Strict I/Os, Replete electrolytes prn, Hold ACE-I due to CHACORTA on CKD. Daily weights +1 lb today     Severe native coronary artery disease w/ viability - on ASA, statin, low dose BB; no ACEi d/t renal dysfunction, tentative plan for high risk PCI w/ Impella support on Tuesday. Acalculus Cholecystitis - slightly more tender today, cont. Zosyn per ID recommendations - pt would not be good surgical candidate - if necessary would place drain by IR. GI consult tomorrow    L pleural effusion - stable, cont. Current dose of diuretics. Follow w/ x-rays    CECILIA - REJI positive, platlets 130 - close f/u    Hyponatremia - imiproved, close monitoring    Diabetes Mellitus - cont. Current treatment coverage, close monitor of BS. DTC following    Cellulitis - cont. Po abx per ID.   Legs improving    H/o Tobacco abuse - smoking cessation, nicotine patch    CHACORTA on CKD3 - renal following, stable w/ decreased diuretic dose    Anemia - stable, monitor    Pulmonary HTN -          Pt d/w Dr. Gisell Moyer     Thank you for letting me see him with you,    KENYETTA Estrada 1721        Saw patient, agree with above  Risk of morbidity and mortality - high  Medical decision making - high complexity

## 2017-02-06 LAB
ALBUMIN SERPL BCP-MCNC: 2.7 G/DL (ref 3.5–5)
ALBUMIN/GLOB SERPL: 0.6 {RATIO} (ref 1.1–2.2)
ALP SERPL-CCNC: 180 U/L (ref 45–117)
ALT SERPL-CCNC: 19 U/L (ref 12–78)
ANION GAP BLD CALC-SCNC: 9 MMOL/L (ref 5–15)
APTT PPP: 28.7 SEC (ref 22.1–32.5)
AST SERPL W P-5'-P-CCNC: 30 U/L (ref 15–37)
BILIRUB SERPL-MCNC: 0.5 MG/DL (ref 0.2–1)
BUN SERPL-MCNC: 45 MG/DL (ref 6–20)
BUN/CREAT SERPL: 24 (ref 12–20)
CALCIUM SERPL-MCNC: 8.8 MG/DL (ref 8.5–10.1)
CHLORIDE SERPL-SCNC: 97 MMOL/L (ref 97–108)
CO2 SERPL-SCNC: 27 MMOL/L (ref 21–32)
CREAT SERPL-MCNC: 1.89 MG/DL (ref 0.7–1.3)
ERYTHROCYTE [DISTWIDTH] IN BLOOD BY AUTOMATED COUNT: 16.3 % (ref 11.5–14.5)
GLOBULIN SER CALC-MCNC: 4.4 G/DL (ref 2–4)
GLUCOSE BLD STRIP.AUTO-MCNC: 144 MG/DL (ref 65–100)
GLUCOSE BLD STRIP.AUTO-MCNC: 148 MG/DL (ref 65–100)
GLUCOSE BLD STRIP.AUTO-MCNC: 203 MG/DL (ref 65–100)
GLUCOSE BLD STRIP.AUTO-MCNC: 215 MG/DL (ref 65–100)
GLUCOSE SERPL-MCNC: 173 MG/DL (ref 65–100)
HCT VFR BLD AUTO: 31.9 % (ref 36.6–50.3)
HGB BLD-MCNC: 9.9 G/DL (ref 12.1–17)
MAGNESIUM SERPL-MCNC: 2.1 MG/DL (ref 1.6–2.4)
MCH RBC QN AUTO: 24.1 PG (ref 26–34)
MCHC RBC AUTO-ENTMCNC: 31 G/DL (ref 30–36.5)
MCV RBC AUTO: 77.6 FL (ref 80–99)
PLATELET # BLD AUTO: 163 K/UL (ref 150–400)
POTASSIUM SERPL-SCNC: 3.9 MMOL/L (ref 3.5–5.1)
PROT SERPL-MCNC: 7.1 G/DL (ref 6.4–8.2)
RBC # BLD AUTO: 4.11 M/UL (ref 4.1–5.7)
SERVICE CMNT-IMP: ABNORMAL
SODIUM SERPL-SCNC: 133 MMOL/L (ref 136–145)
THERAPEUTIC RANGE,PTTT: NORMAL SECS (ref 58–77)
WBC # BLD AUTO: 5.9 K/UL (ref 4.1–11.1)

## 2017-02-06 PROCEDURE — 74011250636 HC RX REV CODE- 250/636: Performed by: PHYSICIAN ASSISTANT

## 2017-02-06 PROCEDURE — 74011636637 HC RX REV CODE- 636/637: Performed by: NURSE PRACTITIONER

## 2017-02-06 PROCEDURE — 85730 THROMBOPLASTIN TIME PARTIAL: CPT | Performed by: PHYSICIAN ASSISTANT

## 2017-02-06 PROCEDURE — 74011250637 HC RX REV CODE- 250/637: Performed by: INTERNAL MEDICINE

## 2017-02-06 PROCEDURE — 74011250637 HC RX REV CODE- 250/637: Performed by: PHYSICIAN ASSISTANT

## 2017-02-06 PROCEDURE — 82962 GLUCOSE BLOOD TEST: CPT

## 2017-02-06 PROCEDURE — 65660000000 HC RM CCU STEPDOWN

## 2017-02-06 PROCEDURE — 74011250636 HC RX REV CODE- 250/636: Performed by: INTERNAL MEDICINE

## 2017-02-06 PROCEDURE — 74011250637 HC RX REV CODE- 250/637: Performed by: NURSE PRACTITIONER

## 2017-02-06 PROCEDURE — 74011636637 HC RX REV CODE- 636/637: Performed by: PHYSICIAN ASSISTANT

## 2017-02-06 PROCEDURE — 74011000258 HC RX REV CODE- 258: Performed by: PHYSICIAN ASSISTANT

## 2017-02-06 PROCEDURE — 36415 COLL VENOUS BLD VENIPUNCTURE: CPT | Performed by: PHYSICIAN ASSISTANT

## 2017-02-06 PROCEDURE — 83735 ASSAY OF MAGNESIUM: CPT | Performed by: PHYSICIAN ASSISTANT

## 2017-02-06 PROCEDURE — 85027 COMPLETE CBC AUTOMATED: CPT | Performed by: PHYSICIAN ASSISTANT

## 2017-02-06 PROCEDURE — 80053 COMPREHEN METABOLIC PANEL: CPT | Performed by: PHYSICIAN ASSISTANT

## 2017-02-06 RX ADMIN — OXYCODONE HYDROCHLORIDE AND ACETAMINOPHEN 2 TABLET: 5; 325 TABLET ORAL at 08:46

## 2017-02-06 RX ADMIN — INSULIN LISPRO 1 UNITS: 100 INJECTION, SOLUTION INTRAVENOUS; SUBCUTANEOUS at 21:23

## 2017-02-06 RX ADMIN — MILRINONE LACTATE 0.3 MCG/KG/MIN: 200 INJECTION, SOLUTION INTRAVENOUS at 13:33

## 2017-02-06 RX ADMIN — CARVEDILOL 3.12 MG: 3.12 TABLET, FILM COATED ORAL at 08:47

## 2017-02-06 RX ADMIN — INSULIN LISPRO 2 UNITS: 100 INJECTION, SOLUTION INTRAVENOUS; SUBCUTANEOUS at 17:56

## 2017-02-06 RX ADMIN — Medication 81 MG: at 08:47

## 2017-02-06 RX ADMIN — OXYCODONE HYDROCHLORIDE AND ACETAMINOPHEN 2 TABLET: 5; 325 TABLET ORAL at 13:41

## 2017-02-06 RX ADMIN — Medication 10 ML: at 06:36

## 2017-02-06 RX ADMIN — ATORVASTATIN CALCIUM 10 MG: 10 TABLET, FILM COATED ORAL at 08:46

## 2017-02-06 RX ADMIN — PIPERACILLIN AND TAZOBACTAM 3.38 G: 3; .375 INJECTION, POWDER, FOR SOLUTION INTRAVENOUS at 21:20

## 2017-02-06 RX ADMIN — GABAPENTIN 100 MG: 100 CAPSULE ORAL at 08:46

## 2017-02-06 RX ADMIN — AMIODARONE HYDROCHLORIDE 200 MG: 200 TABLET ORAL at 21:17

## 2017-02-06 RX ADMIN — GLIMEPIRIDE 4 MG: 2 TABLET ORAL at 06:41

## 2017-02-06 RX ADMIN — Medication 400 MG: at 08:46

## 2017-02-06 RX ADMIN — BUMETANIDE 2 MG: 1 TABLET ORAL at 08:46

## 2017-02-06 RX ADMIN — CARVEDILOL 3.12 MG: 3.12 TABLET, FILM COATED ORAL at 17:55

## 2017-02-06 RX ADMIN — INSULIN GLARGINE 10 UNITS: 100 INJECTION, SOLUTION SUBCUTANEOUS at 10:02

## 2017-02-06 RX ADMIN — GABAPENTIN 100 MG: 100 CAPSULE ORAL at 17:56

## 2017-02-06 RX ADMIN — PIPERACILLIN AND TAZOBACTAM 3.38 G: 3; .375 INJECTION, POWDER, FOR SOLUTION INTRAVENOUS at 15:12

## 2017-02-06 RX ADMIN — BUMETANIDE 2 MG: 1 TABLET ORAL at 15:12

## 2017-02-06 RX ADMIN — OXYCODONE HYDROCHLORIDE AND ACETAMINOPHEN 2 TABLET: 5; 325 TABLET ORAL at 21:17

## 2017-02-06 RX ADMIN — PIPERACILLIN AND TAZOBACTAM 3.38 G: 3; .375 INJECTION, POWDER, FOR SOLUTION INTRAVENOUS at 06:33

## 2017-02-06 RX ADMIN — OXYCODONE HYDROCHLORIDE AND ACETAMINOPHEN 2 TABLET: 5; 325 TABLET ORAL at 01:08

## 2017-02-06 RX ADMIN — Medication 10 ML: at 21:24

## 2017-02-06 RX ADMIN — BUMETANIDE 2 MG: 1 TABLET ORAL at 21:17

## 2017-02-06 RX ADMIN — Medication 10 ML: at 15:12

## 2017-02-06 RX ADMIN — AMIODARONE HYDROCHLORIDE 200 MG: 200 TABLET ORAL at 08:46

## 2017-02-06 NOTE — PROGRESS NOTES
[facility] Progress Note      NAME:  Fernanda Jean :   1950   MRN:   [id number]   PCP:  None  CARD:   Dr. Zoe Han    Date:  2017     Fernanda Jean is a 61 y.o. male with a who presented for further evaluation of severe CAD and systolic heart failure. Subjective:   He was initially seen at [facility] 3 years ago and told that he had heart failure with LVEF 30%. He was lost to follow up due to lack of insurance and has not been seen by a physician in the interim. He complains of progressive fatigue, NAVARRO, PND, and edema over the past year, prompting presentation to [facility]. Pt reports abdominal pain is less today. Denies N/V. Has some SOB when oxygen is off +orthopnea. Peeled a piece of skin off his heel which is causing a significant amount of pain. Past 24 hours:  Abdominal pain lessening  Remains on milrinone gtt    Objective:     Visit Vitals    /66 (BP 1 Location: Right arm, BP Patient Position: Head of bed elevated (Comment degrees))    Pulse 76    Temp 97.7 °F (36.5 °C)    Resp 18    Ht 5' 9\" (1.753 m)    Wt 187 lb 6.3 oz (85 kg)    SpO2 99%    BMI 27.67 kg/m2        Physical Exam:    Gen:  WA, WN, NAD  HEENT:  EOMI  Neck:  (+) JVD  CVS:  S1/S2,  +S3  Pul:  Decreased L base, R side clear  Abd:  Soft, mild tenderness upper quadrants, decreased since yesterday  Ext:  1+ b/l LE edema, multiple bandages in place, +erythema  Neuro:  No obvious deficits    1901 -  0700  In: 1966.8 [P.O.:1170; I.V.:796.8]  Out: 5375 [Urine:5375]  O2 Flow Rate (L/min): 2 l/min O2 Device: Nasal cannula  Temp (24hrs), Av.8 °F (36.6 °C), Min:97.2 °F (36.2 °C), Max:98.6 °F (37 °C)    Past History:     Past Medical History   Diagnosis Date    Cardiomyopathy ([address] 2017     A. Echo (17):  EF 5-10% with severe GHK,. Mildly dil LA. Mild TR. PASP 46. No past surgical history on file.     Social History   Substance Use Topics    Smoking status: Not on file    Smokeless tobacco: Not on file    Alcohol use Not on file        No family history on file. Allergies:   No Known Allergies       Data Review:     CXR:   CXR Results  (Last 48 hours)               02/05/17 0425  XR CHEST PORT Final result    Impression:  IMPRESSION: No significant change. Narrative:  INDICATION:  Chest tube       EXAM: CXR Portable. FINDINGS: Portable chest shows stable appearance including PICC line since   yesterday. There is no apparent pneumothorax. Lungs show left base   consolidation. Heart size is large. There is no overt pulmonary edema. Echocardiogram:   Echo Results  (Last 48 hours)    None        No results found for this visit on 01/20/17. ECG: sinus rhythm on telemetry  LABS:  Recent Results (from the past 24 hour(s))   GLUCOSE, POC    Collection Time: 02/05/17  4:34 PM   Result Value Ref Range    Glucose (POC) 119 (H) 65 - 100 mg/dL    Performed by [id number] [place] [address], POC    Collection Time: 02/05/17  9:46 PM   Result Value Ref Range    Glucose (POC) 163 (H) 65 - 100 mg/dL    Performed by [id number] [place] [address], COMPREHENSIVE    Collection Time: 02/06/17  3:26 AM   Result Value Ref Range    Sodium 133 (L) 136 - 145 mmol/L    Potassium 3.9 3.5 - 5.1 mmol/L    Chloride 97 97 - 108 mmol/L    CO2 27 21 - 32 mmol/L    Anion gap 9 5 - 15 mmol/L    Glucose 173 (H) 65 - 100 mg/dL    BUN 45 (H) 6 - 20 MG/DL    Creatinine 1.89 (H) 0.70 - 1.30 MG/DL    BUN/Creatinine ratio 24 (H) 12 - 20      GFR est AA 44 (L) >60 ml/min/1.73m2    GFR est non-AA 36 (L) >60 ml/min/1.73m2    Calcium 8.8 8.5 - 10.1 MG/DL    Bilirubin, total 0.5 0.2 - 1.0 MG/DL    ALT (SGPT) 19 12 - 78 U/L    AST (SGOT) 30 15 - 37 U/L    Alk.  phosphatase 180 (H) 45 - 117 U/L    Protein, total 7.1 6.4 - 8.2 g/dL    Albumin 2.7 (L) 3.5 - 5.0 g/dL    Globulin 4.4 (H) 2.0 - 4.0 g/dL    A-G Ratio 0.6 (L) 1.1 - 2.2     MAGNESIUM    Collection Time: 02/06/17  3:26 AM   Result Value Ref Range    Magnesium 2.1 1.6 - 2.4 mg/dL   CBC W/O DIFF    Collection Time: 02/06/17  3:26 AM   Result Value Ref Range    WBC 5.9 4.1 - 11.1 K/uL    RBC 4.11 4.10 - 5.70 M/uL    HGB 9.9 (L) 12.1 - 17.0 g/dL    HCT 31.9 (L) 36.6 - 50.3 %    MCV 77.6 (L) 80.0 - 99.0 FL    MCH 24.1 (L) 26.0 - 34.0 PG    MCHC 31.0 30.0 - 36.5 g/dL    RDW 16.3 (H) 11.5 - 14.5 %    PLATELET 750 037 - 697 K/uL   PTT    Collection Time: 02/06/17  3:26 AM   Result Value Ref Range    aPTT 28.7 22.1 - 32.5 sec    aPTT, therapeutic range     58.0 - 77.0 SECS   GLUCOSE, POC    Collection Time: 02/06/17  6:41 AM   Result Value Ref Range    Glucose (POC) 144 (H) 65 - 100 mg/dL    Performed by [address] [place] [address], POC    Collection Time: 02/06/17 11:41 AM   Result Value Ref Range    Glucose (POC) 148 (H) 65 - 100 mg/dL    Performed by Zabrina Lepe      All Micro Results     Procedure Component Value Units Date/Time    CULTURE, BLOOD, PAIRED [[id number]] Collected:  02/05/17 0322    Order Status:  Completed Specimen:  Blood Updated:  02/06/17 0510     Special Requests: NO SPECIAL REQUESTS        Culture result: NO GROWTH AFTER 22 HOURS       CULTURE, BODY FLUID Quillian Chill STAIN [[id number]] Collected:  01/26/17 1505    Order Status:  Completed Specimen:  Thoracentesis Updated:  01/30/17 1057     Special Requests: NO SPECIAL REQUESTS        GRAM STAIN OCCASIONAL  WBCS SEEN         NO ORGANISMS SEEN        Culture result: NO GROWTH 4 DAYS       CULTURE, Sai Dorado STAIN [[id number]] Collected:  01/25/17 2137    Order Status:  Completed Specimen:  Leg Updated:  01/27/17 1440     Special Requests: NO SPECIAL REQUESTS        GRAM STAIN RARE  WBCS SEEN         NO ORGANISMS SEEN        Culture result: LIGHT  STREPTOCOCCI, BETA HEMOLYTIC GROUP C  . .. Penicillin and ampicillin are drugs of choice for treatment of beta-hemolytic streptococcal infections.  Susceptibility testing of penicillins and beta-lactams approved by the FDA for treatment of beta-hemolytic streptococcal infections need not be performed routinely, because nonsusceptible isolates are extremely rare. CLSI 2012         MODERATE  BETTIE TROPICALIS             Specimen Source   1: Pleural Fluid   CYTOLOGIC INTERPRETATION:   Scattered mesothelial cells with degenerative changes and mixed inflammatory cells   General Categorization   No cells diagnostic for malignancy   Specimen Adequacy   Satisfactory for evaluation        IMPRESSION  1. Markedly dilated left ventricle with 3-D end-diastolic volume index to body  surface area of 153 mL/sq m. Mild eccentric left ventricular hypertrophy with  3-D mass index to body surface area of 85 g/sq m. Severe left ventricular  systolic dysfunction. Severe global hypokinesis with regional variation. 3-D  LVEF 10%. 2. Moderately dilated right ventricle by 3-D volumetric assessment. RV end  diastolic volume indexed to body surface area of 138 mL/sq m. Moderate to severe  right ventricular systolic dysfunction. Global hypokinesis. 3-D RVEF 25%. 3. Apical a tethered mitral valve leaflets. Mild mitral regurgitation. 4. Moderately severe 3+ tricuspid regurgitation. 5. On LGE study, the anterior wall, anteroseptal wall, anteroapical wall, and  anterior lateral wall are largely viable without significant myocardial  infarction. The entire inferior wall and inferoseptal wall are completely viable  without any infarct. The base to mid inferolateral wall demonstrate a near  transmural greater than 75% thickness infarct with minimal or no viable  myocardium in this territory. The distal inferolateral wall, apical lateral wall  does not demonstrate any infarct and are largely viable. Based on the viability  imaging, the entire LAD territory is largely viable and should recover upon  revascularization. The entire RCA territory is largely viable and should recover  upon revascularization.  The LCx territory demonstrate medium-size infarct with  limited viability. 6. Large left-sided pleural effusion with passive atelectasis of the left lung. 7. Dilated branch pulmonary arteries suggest pulmonary hypertension. 8. Markedly dilated left atrium measuring 59 x 55 mm. Moderately dilated right  atrium measuring 46 x 56 mm.     Medications reviewed:    Current Facility-Administered Medications   Medication Dose Route Frequency    docusate sodium (COLACE) capsule 100 mg  100 mg Oral DAILY PRN    piperacillin-tazobactam (ZOSYN) 3.375 g in 0.9% sodium chloride (MBP/ADV) 100 mL  3.375 g IntraVENous Q8H    bumetanide (BUMEX) tablet 2 mg  2 mg Oral TID    nicotine (NICODERM CQ) 21 mg/24 hr patch 1 Patch  1 Patch TransDERmal DAILY    glimepiride (AMARYL) tablet 4 mg  4 mg Oral ACB    insulin glargine (LANTUS) injection 10 Units  10 Units SubCUTAneous DAILY    gabapentin (NEURONTIN) capsule 100 mg  100 mg Oral BID    milrinone (PRIMACOR) 20 MG/100 ML D5W infusion  0.3 mcg/kg/min IntraVENous CONTINUOUS    carvedilol (COREG) tablet 3.125 mg  3.125 mg Oral BID WITH MEALS    amiodarone (CORDARONE) tablet 200 mg  200 mg Oral Q12H    bacitracin 500 unit/gram packet 1 Packet  1 Packet Topical PRN    atorvastatin (LIPITOR) tablet 10 mg  10 mg Oral DAILY    0.9% sodium chloride infusion  10 mL/hr IntraVENous CONTINUOUS    acetaminophen (TYLENOL) tablet 650 mg  650 mg Oral Q6H PRN    aspirin delayed-release tablet 81 mg  81 mg Oral DAILY    glucagon (GLUCAGEN) injection 1 mg  1 mg IntraMUSCular PRN    glucose chewable tablet 16 g  4 Tab Oral PRN    insulin lispro (HUMALOG) injection   SubCUTAneous AC&HS    magnesium oxide (MAG-OX) tablet 400 mg  400 mg Oral DAILY    0.9% sodium chloride infusion  3 mL/hr IntraVENous CONTINUOUS    sodium chloride (NS) flush 5-10 mL  5-10 mL IntraVENous Q8H    sodium chloride (NS) flush 5-10 mL  5-10 mL IntraVENous PRN    albuterol (PROVENTIL VENTOLIN) nebulizer solution 2.5 mg  2.5 mg Nebulization Q4H PRN    diphenhydrAMINE (BENADRYL) capsule 25 mg  25 mg Oral QHS PRN    oxyCODONE-acetaminophen (PERCOCET) 5-325 mg per tablet 2 Tab  2 Tab Oral Q4H PRN    ELECTROLYTE REPLACEMENT PROTOCOL  1 Each Other PRN     PFTs  FVC 2.62 60% predicted             FEV1 1.51 50% predicted             FEV1/FVC 54.57    Ltd abd ultrasouond - FINDINGS: The gallbladder is moderately distended. The gallbladder wall appears  thickened measuring 5 mm. There is a small amount of pericholecystic fluid. There is tenderness to probe palpation over the gallbladder fossa. No gallstone  is shown. No intrahepatic biliary ductal dilation is demonstrated. Common bile  duct diameter measures 10 mm, similar to that measured previously.     The liver is again shown to be enlarged and diffusely echogenic in texture  though without indication of focal hepatic mass lesion. Portal venous flow is  hepatopedal. The visualized pancreatic head and right kidney appear normal with  right renal length 10.6 cm.     IMPRESSION  IMPRESSION: Distended gallbladder with gallbladder wall thickening,  pericholecystic fluid and tenderness to probe palpation during exam. Findings  are consistent with acalculus cholecystitis. IMPRESSION/Plan:   Acute on chronic systolic heart failure (ICM) - Stage D, NYHA Class IV - continue Milrinone at   0.3 mcg/kg/min, Continue low dose Coreg and Bumex, Strict I/Os, Replete electrolytes prn, Hold ACE-I due to CHACORTA on CKD. Daily weights. Weight down 3 lbs today. Severe native coronary artery disease w/ viability - on ASA, statin, low dose BB; no ACEi d/t renal dysfunction, tentative plan for high risk PCI w/ Impella support on Thursday    Acalculus Cholecystitis - consult GI, Consult IR - for drain placement. Cont. Zosyn    L pleural effusion - stable, cont. Current dose of diuretics.  Check CXR tomorrow    CECILIA - REJI positive, platlets 130 - close f/u NO HEPARIN    Hyponatremia - slightly decreased, close monitoring    Diabetes Mellitus - cont. Current treatment coverage, close monitor of BS. DTC following    Cellulitis - cont. Po abx per ID.   Legs improving    H/o Tobacco abuse - smoking cessation, nicotine patch    CHACORTA on CKD3 - renal following - stable on current dose of diuretics    Anemia - stable, monitor    Pulmonary HTN -          Pt seen with Dr. Ester Aggarwal and Dr. Demetria Fonseca    Thank you for letting me see him with you,    KENYETTA Nam Roman 1721    Saw patient, agree with above  Risk of morbidity and mortality - high  Medical decision making - high complexity

## 2017-02-06 NOTE — PROGRESS NOTES
0730:  Bedside shift change report given to Oz Nguyen (oncoming nurse) by Yisel Bauer (offgoing nurse). Report included the following information SBAR, Kardex, MAR and Recent Results.

## 2017-02-06 NOTE — PROGRESS NOTES
[facility] Progress Note      NAME:  Fernanda Nascimento :   1950   MRN:   [id number]   PCP:  None  CARD:   Dr. Hernandez De La Rosa    Date:  2017     Fernanda Nascimento is a 61 y.o. male with a who presented for further evaluation of severe CAD and systolic heart failure. Subjective:   He was initially seen at [facility] 3 years ago and told that he had heart failure with LVEF 30%. He was lost to follow up due to lack of insurance and has not been seen by a physician in the interim. He complains of progressive fatigue, NAVARRO, PND, and edema over the past year, prompting presentation to [facility]. He also complained of lower extremity bullae, weeping, and pain. He denied palpitations, presyncope, syncope, or chest pain. Past 24 hours:  Denies dyspnea  Complains of fatigue  RUQ pain over the weekend with acalculous cholecystitis on US - improved with IV zosyn  Denies nausea and vomitting      Objective:     Visit Vitals    /66 (BP 1 Location: Right arm, BP Patient Position: Head of bed elevated (Comment degrees))    Pulse 76    Temp 97.7 °F (36.5 °C)    Resp 18    Ht 5' 9\" (1.753 m)    Wt 187 lb 6.3 oz (85 kg)    SpO2 99%    BMI 27.67 kg/m2          General:  fatigued    HEENT: Normocephalic, EOMI, PERRLA, Hearing intact, trachea mid-line    Neck:  supple, no significant adenopathy, carotids upstroke normal bilaterally, no bruits    CVP:  10  cm  ( + ) HJR    Heart:  Diminished PMI    Normal S1 and S2, S3 gallop    Murmur: 2/6 systolic murmur    Lungs: Diminished breath sounds left lower lung    Abdomen: soft, non-tender. Bowel sounds normal. +HSM    Extremity: Mild erythema bilateral lower extremities with 1-2+ pitting edema bilaterally    Neuro: Alert and oriented to person, place, and time; normal strength and tone. Normal symmetric reflexes. Normal coordination and gait       1901 -  0700  In: 1966.8 [P.O.:1170;  I.V.:796.8]  Out: 5375 [Urine:5375]  O2 Flow Rate (L/min): 2 l/min O2 Device: Nasal cannula  Temp (24hrs), Av.8 °F (36.6 °C), Min:97.2 °F (36.2 °C), Max:98.6 °F (37 °C)          Care Plan discussed with:    Comments   Patient x    Family      RN x    Care Manager                    Consultant:          Past History:     Past Medical History   Diagnosis Date    Cardiomyopathy ([address] 2017     A. Echo (17):  EF 5-10% with severe GHK,. Mildly dil LA. Mild TR. PASP 46. No past surgical history on file. Social History   Substance Use Topics    Smoking status: Not on file    Smokeless tobacco: Not on file    Alcohol use Not on file        No family history on file. Allergies:   No Known Allergies       Data Review:     CXR:   CXR Results  (Last 48 hours)               17 0425  XR CHEST PORT Final result    Impression:  IMPRESSION: No significant change. Narrative:  INDICATION:  Chest tube       EXAM: CXR Portable. FINDINGS: Portable chest shows stable appearance including PICC line since   yesterday. There is no apparent pneumothorax. Lungs show left base   consolidation. Heart size is large. There is no overt pulmonary edema. Echocardiogram:   Echo Results  (Last 48 hours)    None          No results found for this visit on 17.       ECG:  EKG: ST with occasional PVC, NSIVCD, LAE    LABS:  Recent Results (from the past 24 hour(s))   GLUCOSE, POC    Collection Time: 17  4:34 PM   Result Value Ref Range    Glucose (POC) 119 (H) 65 - 100 mg/dL    Performed by [id number] [place] [address], POC    Collection Time: 17  9:46 PM   Result Value Ref Range    Glucose (POC) 163 (H) 65 - 100 mg/dL    Performed by [id number] [facility], COMPREHENSIVE    Collection Time: 17  3:26 AM   Result Value Ref Range    Sodium 133 (L) 136 - 145 mmol/L    Potassium 3.9 3.5 - 5.1 mmol/L    Chloride 97 97 - 108 mmol/L    CO2 27 21 - 32 mmol/L    Anion gap 9 5 - 15 mmol/L    Glucose 173 (H) 65 - 100 mg/dL    BUN 45 (H) 6 - 20 MG/DL    Creatinine 1.89 (H) 0.70 - 1.30 MG/DL    BUN/Creatinine ratio 24 (H) 12 - 20      GFR est AA 44 (L) >60 ml/min/1.73m2    GFR est non-AA 36 (L) >60 ml/min/1.73m2    Calcium 8.8 8.5 - 10.1 MG/DL    Bilirubin, total 0.5 0.2 - 1.0 MG/DL    ALT (SGPT) 19 12 - 78 U/L    AST (SGOT) 30 15 - 37 U/L    Alk.  phosphatase 180 (H) 45 - 117 U/L    Protein, total 7.1 6.4 - 8.2 g/dL    Albumin 2.7 (L) 3.5 - 5.0 g/dL    Globulin 4.4 (H) 2.0 - 4.0 g/dL    A-G Ratio 0.6 (L) 1.1 - 2.2     MAGNESIUM    Collection Time: 02/06/17  3:26 AM   Result Value Ref Range    Magnesium 2.1 1.6 - 2.4 mg/dL   CBC W/O DIFF    Collection Time: 02/06/17  3:26 AM   Result Value Ref Range    WBC 5.9 4.1 - 11.1 K/uL    RBC 4.11 4.10 - 5.70 M/uL    HGB 9.9 (L) 12.1 - 17.0 g/dL    HCT 31.9 (L) 36.6 - 50.3 %    MCV 77.6 (L) 80.0 - 99.0 FL    MCH 24.1 (L) 26.0 - 34.0 PG    MCHC 31.0 30.0 - 36.5 g/dL    RDW 16.3 (H) 11.5 - 14.5 %    PLATELET 090 111 - 692 K/uL   PTT    Collection Time: 02/06/17  3:26 AM   Result Value Ref Range    aPTT 28.7 22.1 - 32.5 sec    aPTT, therapeutic range     58.0 - 77.0 SECS   GLUCOSE, POC    Collection Time: 02/06/17  6:41 AM   Result Value Ref Range    Glucose (POC) 144 (H) 65 - 100 mg/dL    Performed by [address] [place] [address], POC    Collection Time: 02/06/17 11:41 AM   Result Value Ref Range    Glucose (POC) 148 (H) 65 - 100 mg/dL    Performed by [employer]      All Micro Results     Procedure Component Value Units Date/Time    CULTURE, BLOOD, PAIRED [[id number]] Collected:  02/05/17 0322    Order Status:  Completed Specimen:  Blood Updated:  02/06/17 0510     Special Requests: NO SPECIAL REQUESTS        Culture result: NO GROWTH AFTER 22 HOURS       CULTURE, BODY FLUID Linford Lama STAIN [[id number]] Collected:  01/26/17 1505    Order Status:  Completed Specimen:  Thoracentesis Updated:  01/30/17 1057     Special Requests: NO SPECIAL REQUESTS        GRAM STAIN OCCASIONAL  WBCS SEEN NO ORGANISMS SEEN        Culture result: NO GROWTH 4 DAYS       CULTURE, Silvestre Thomas [[id number]] Collected:  01/25/17 2134    Order Status:  Completed Specimen:  Leg Updated:  01/27/17 1440     Special Requests: NO SPECIAL REQUESTS        GRAM STAIN RARE  WBCS SEEN         NO ORGANISMS SEEN        Culture result: LIGHT  STREPTOCOCCI, BETA HEMOLYTIC GROUP C  . .. Penicillin and ampicillin are drugs of choice for treatment of beta-hemolytic streptococcal infections. Susceptibility testing of penicillins and beta-lactams approved by the FDA for treatment of beta-hemolytic streptococcal infections need not be performed routinely, because nonsusceptible isolates are extremely rare. CLSI 2012         MODERATE  BETTIE TROPICALIS           Abdominal Ultrasound 2/4/17  IMPRESSION: Distended gallbladder with gallbladder wall thickening,  pericholecystic fluid and tenderness to probe palpation during exam. Findings  are consistent with acalculus cholecystitis. Thoracentesis 1/26/17  Specimen Source   1: Pleural Fluid   CYTOLOGIC INTERPRETATION:   Scattered mesothelial cells with degenerative changes and mixed inflammatory cells   General Categorization   No cells diagnostic for malignancy   Specimen Adequacy   Satisfactory for evaluation       Cardiac MRI 1/31/17  IMPRESSION  1. Markedly dilated left ventricle with 3-D end-diastolic volume index to body  surface area of 153 mL/sq m. Mild eccentric left ventricular hypertrophy with  3-D mass index to body surface area of 85 g/sq m. Severe left ventricular  systolic dysfunction. Severe global hypokinesis with regional variation. 3-D  LVEF 10%. 2. Moderately dilated right ventricle by 3-D volumetric assessment. RV end  diastolic volume indexed to body surface area of 138 mL/sq m. Moderate to severe  right ventricular systolic dysfunction. Global hypokinesis. 3-D RVEF 25%. 3. Apical a tethered mitral valve leaflets. Mild mitral regurgitation.   4. Moderately severe 3+ tricuspid regurgitation. 5. On LGE study, the anterior wall, anteroseptal wall, anteroapical wall, and  anterior lateral wall are largely viable without significant myocardial  infarction. The entire inferior wall and inferoseptal wall are completely viable  without any infarct. The base to mid inferolateral wall demonstrate a near  transmural greater than 75% thickness infarct with minimal or no viable  myocardium in this territory. The distal inferolateral wall, apical lateral wall  does not demonstrate any infarct and are largely viable. Based on the viability  imaging, the entire LAD territory is largely viable and should recover upon  revascularization. The entire RCA territory is largely viable and should recover  upon revascularization. The LCx territory demonstrate medium-size infarct with  limited viability. 6. Large left-sided pleural effusion with passive atelectasis of the left lung. 7. Dilated branch pulmonary arteries suggest pulmonary hypertension. 8. Markedly dilated left atrium measuring 59 x 55 mm. Moderately dilated right  atrium measuring 46 x 56 mm.     Medications reviewed:    Current Facility-Administered Medications   Medication Dose Route Frequency    docusate sodium (COLACE) capsule 100 mg  100 mg Oral DAILY PRN    piperacillin-tazobactam (ZOSYN) 3.375 g in 0.9% sodium chloride (MBP/ADV) 100 mL  3.375 g IntraVENous Q8H    bumetanide (BUMEX) tablet 2 mg  2 mg Oral TID    nicotine (NICODERM CQ) 21 mg/24 hr patch 1 Patch  1 Patch TransDERmal DAILY    glimepiride (AMARYL) tablet 4 mg  4 mg Oral ACB    insulin glargine (LANTUS) injection 10 Units  10 Units SubCUTAneous DAILY    gabapentin (NEURONTIN) capsule 100 mg  100 mg Oral BID    milrinone (PRIMACOR) 20 MG/100 ML D5W infusion  0.3 mcg/kg/min IntraVENous CONTINUOUS    carvedilol (COREG) tablet 3.125 mg  3.125 mg Oral BID WITH MEALS    amiodarone (CORDARONE) tablet 200 mg  200 mg Oral Q12H    bacitracin 500 unit/gram packet 1 Packet  1 Packet Topical PRN    atorvastatin (LIPITOR) tablet 10 mg  10 mg Oral DAILY    0.9% sodium chloride infusion  10 mL/hr IntraVENous CONTINUOUS    acetaminophen (TYLENOL) tablet 650 mg  650 mg Oral Q6H PRN    aspirin delayed-release tablet 81 mg  81 mg Oral DAILY    glucagon (GLUCAGEN) injection 1 mg  1 mg IntraMUSCular PRN    glucose chewable tablet 16 g  4 Tab Oral PRN    insulin lispro (HUMALOG) injection   SubCUTAneous AC&HS    magnesium oxide (MAG-OX) tablet 400 mg  400 mg Oral DAILY    0.9% sodium chloride infusion  3 mL/hr IntraVENous CONTINUOUS    sodium chloride (NS) flush 5-10 mL  5-10 mL IntraVENous Q8H    sodium chloride (NS) flush 5-10 mL  5-10 mL IntraVENous PRN    albuterol (PROVENTIL VENTOLIN) nebulizer solution 2.5 mg  2.5 mg Nebulization Q4H PRN    diphenhydrAMINE (BENADRYL) capsule 25 mg  25 mg Oral QHS PRN    oxyCODONE-acetaminophen (PERCOCET) 5-325 mg per tablet 2 Tab  2 Tab Oral Q4H PRN    ELECTROLYTE REPLACEMENT PROTOCOL  1 Each Other PRN           IMPRESSION:   1. Acute on chronic systolic heart failure (ICM) - Stage D, NYHA Class IV  2. Severe native coronary artery disease  3. Diabetes Mellitus, Hga1c 13.5  4. History of tobacco abuse  5. Cellulitis  6. CHACORTA on CKD3  7. Pulmonary HTN  8. Persistent atrial fibrillation  9. Hyponatremia  10. Left pleural effusion  11. Acalculous Cholecystitis       PLAN:   1. Continue  IV milrinone 0.3 mcg/kg/min, Follow I/O, Replete electrolytes, Hold ACE-I due to CHACORTA on CKD. Continue low dose coreg and IV bumex to 2 mg tid  2. Continue ASA, statin, low dose BB; too high risk for CABG d/t low LVEF and diabetes out of control  3. Viable LAD and RCA territory and limited LCx viability - discussed high risk Impella supported Left Main PCI with Mary López and Miriam Reed, tenatively scheduled for Thursday 2/9/17  4.  Discussed risks/benefits including death for left main stent as well as the risk of death with medical therapy alone - encouraged patient to discuss his ACP with his wife and family  11. Lantus and sliding scale insulin, accuchecks, diabetic education  6. IV cefazolin 1 gram q 8 hours for cellulitis and IV zosyn 3.375 g q 8 hours for choleycystitis  7. Smoking cessation counseling, nicotine patch         Thank you for letting me see him with you,    Dorie Galvan MD, [place] [address] Director  Jennifer Roman [address] [place] [address]  Office: [phone]  Fax: [phone]  24 hour VAD/HF Pager: [phone]

## 2017-02-06 NOTE — PROGRESS NOTES
Infectious Diseases Progress Note    Antibiotic Summary:  Levaquin  --   Vancomycin  --   Ancef    -- 2/3  Keflex   2/3 --   Zosyn   -- present      Subjective:     Still has epigastric pain but appetite is excellent and he has no N or V.    Objective:     Vitals:   Visit Vitals    /69 (BP 1 Location: Right arm, BP Patient Position: At rest)    Pulse 80    Temp 97.5 °F (36.4 °C)    Resp 18    Ht 5' 9\" (1.753 m)    Wt 86.3 kg (190 lb 4.1 oz)    SpO2 99%    BMI 28.1 kg/m2        Tmax:  Temp (24hrs), Av.8 °F (36.6 °C), Min:97.5 °F (36.4 °C), Max:98 °F (36.7 °C)      Exam:  General appearance: alert, no distress  Lungs: few rales at bases  Heart: RRR  Abdomen: tender RUQ and epigastrium  Left leg: much improved  Right leg: much improved    IV Lines: Left PICC inserted     Labs:    Recent Labs      17   0322  17   0436  17   0259   WBC  5.6  5.3  7.1   HGB  10.2*  10.1*  9.7*   PLT  153  124*  103*   BUN  50*  49*  45*   CREA  1.86*  1.82*  1.85*   TBILI  0.5  0.5  0.5   SGOT  24  25  26   AP  175*  171*  161*     Culture right leg ulcers  = group C Streptococcus    US abdomen 2/3 = \"Distended gallbladder with gallbladder wall thickening,  pericholecystic fluid and tenderness to probe palpation during exam. Findings  are consistent with acalculus cholecystitis\"    Assessment:     1. Bilateral venous stasis disease of the LEs +/- cellulitis: Much better     2. RUQ tenderness and abnormal GB US: Clinically, he says the symptoms are unchanged for months or even years. Now on Zosyn     3. OHD -- IHD/CAD; CHF; pulm HTN     4. CRF with acute exacerbation     5. NIDDM -- new diagnosis     6. Probable COPD -- heavy smoking since age 7      9. Anemia -- HC/MC -- positive family history of colon cancer -- may need GI W/U    Plan:     1.  Brionna Garcia MD

## 2017-02-06 NOTE — PROGRESS NOTES
0800 Bedside shift change report given to Aba Franklin RN  (oncoming nurse) by Albert Granados RN (offgoing nurse). Report included the following information SBAR, Kardex, Procedure Summary, Intake/Output, MAR, Recent Results, Med Rec Status and Cardiac Rhythm NSR with BBB. Problem: Diabetes Self-Management  Goal: *Disease process and treatment process  Define diabetes and identify own type of diabetes; list 3 options for treating diabetes. Outcome: Progressing Towards Goal  Blood glucose is being managed. Problem: Falls - Risk of  Goal: *Absence of falls  Outcome: Progressing Towards Goal  Patient is free from falls. Problem: Pressure Ulcer - Risk of  Goal: *Prevention of pressure ulcer  Outcome: Progressing Towards Goal  Patient moves side to side with ease.

## 2017-02-06 NOTE — PROGRESS NOTES
[facility]   [address] [facility], [facility] [address], [facility]  Phone: [phone]   [phone]       Nephrology Progress Note  Beatrice Marcial     1950     [id number]  Date of Admission : 1/20/2017 02/06/17    CC: Follow up for CKD/ARF      Assessment and Plan   CHACORTA on CKD :  - CHACORTA likely 2/2 cardiorenal syndrome  - Cr stable w/ diuresis  - continue Bumex 2 mg TID    CKD :  · Baseline Cr unknown   · Presumed to be 2/2 untreated DM, HTN and CMP at this time     Thrombocytopenia :  -  CECILIA Ab +ve. REJI +VE -- avoid Heparin in future     Hyponatremia :  - combination of  CHF + SIADH from chronic alcoholism  - Na at 133 today  - No further tolvaptan for now  - daily Na levels for now    Acute on Chronic Systolic CHF w/ severe CMP  - echo with EF 5-10%, severely dilated LV, severe TR  - Multivessel CAD : MRI showed viable myocardium   - on Milrinone  - High PCI planned from 2/7    Cellulitis RLE w/ Pustular lesions : On Ancef per ID    Pulm HTN     Chronic smoker w/VENANCIO -PNA         Interval History:  Seen and examined. Abdomen feels better  No cp, sob, n/v/d, fever or chills. Review of Systems: Pertinent items are noted in HPI.     Current Medications:   Current Facility-Administered Medications   Medication Dose Route Frequency    docusate sodium (COLACE) capsule 100 mg  100 mg Oral DAILY PRN    piperacillin-tazobactam (ZOSYN) 3.375 g in 0.9% sodium chloride (MBP/ADV) 100 mL  3.375 g IntraVENous Q8H    bumetanide (BUMEX) tablet 2 mg  2 mg Oral TID    nicotine (NICODERM CQ) 21 mg/24 hr patch 1 Patch  1 Patch TransDERmal DAILY    glimepiride (AMARYL) tablet 4 mg  4 mg Oral ACB    insulin glargine (LANTUS) injection 10 Units  10 Units SubCUTAneous DAILY    gabapentin (NEURONTIN) capsule 100 mg  100 mg Oral BID    milrinone (PRIMACOR) 20 MG/100 ML D5W infusion  0.3 mcg/kg/min IntraVENous CONTINUOUS    carvedilol (COREG) tablet 3.125 mg  3.125 mg Oral BID WITH MEALS    amiodarone (CORDARONE) tablet 200 mg  200 mg Oral Q12H    bacitracin 500 unit/gram packet 1 Packet  1 Packet Topical PRN    atorvastatin (LIPITOR) tablet 10 mg  10 mg Oral DAILY    0.9% sodium chloride infusion  10 mL/hr IntraVENous CONTINUOUS    acetaminophen (TYLENOL) tablet 650 mg  650 mg Oral Q6H PRN    aspirin delayed-release tablet 81 mg  81 mg Oral DAILY    glucagon (GLUCAGEN) injection 1 mg  1 mg IntraMUSCular PRN    glucose chewable tablet 16 g  4 Tab Oral PRN    insulin lispro (HUMALOG) injection   SubCUTAneous AC&HS    magnesium oxide (MAG-OX) tablet 400 mg  400 mg Oral DAILY    0.9% sodium chloride infusion  3 mL/hr IntraVENous CONTINUOUS    sodium chloride (NS) flush 5-10 mL  5-10 mL IntraVENous Q8H    sodium chloride (NS) flush 5-10 mL  5-10 mL IntraVENous PRN    albuterol (PROVENTIL VENTOLIN) nebulizer solution 2.5 mg  2.5 mg Nebulization Q4H PRN    diphenhydrAMINE (BENADRYL) capsule 25 mg  25 mg Oral QHS PRN    oxyCODONE-acetaminophen (PERCOCET) 5-325 mg per tablet 2 Tab  2 Tab Oral Q4H PRN    ELECTROLYTE REPLACEMENT PROTOCOL  1 Each Other PRN      No Known Allergies    Objective:  Vitals:    Vitals:    02/05/17 1926 02/05/17 2341 02/06/17 0321 02/06/17 0752   BP: 111/69 113/64 107/54 126/74   Pulse: 80 90 85 86   Resp: 18 20 20 18   Temp: 97.5 °F (36.4 °C) 98.3 °F (36.8 °C) 98.6 °F (37 °C) 97.2 °F (36.2 °C)   SpO2: 99% 96% 99% 98%   Weight:   85 kg (187 lb 6.3 oz)    Height:         Intake and Output:     02/04 1901 - 02/06 0700  In: 1966.8 [P.O.:1170;  I.V.:796.8]  Out: 5375 [Urine:5375]    Physical Examination:  General: Appears stated age  Resp:  Lungs mostly clear  CV:  RRR,  no murmur or rub,+ LE edema  GI:  Soft, distended, NT, + Bowel sounds, no hepatosplenomegaly  Neurologic:  Non focal  Skin:  RLE cellulitis - resolving   :  No mendoza    []    High complexity decision making was performed  []    Patient is at high-risk of decompensation with multiple organ involvement    Lab Data Personally Reviewed: I have reviewed all the pertinent labs, microbiology data and radiology studies during assessment. Recent Labs      02/06/17 0326 02/05/17 0322 02/04/17   0436  02/03/17   1244   NA  133*  136  132*  131*   K  3.9  4.2  4.4   --    CL  97  97  95*   --    CO2  27  29  28   --    GLU  173*  130*  161*   --    BUN  45*  50*  49*   --    CREA  1.89*  1.86*  1.82*   --    CA  8.8  9.0  8.9   --    MG  2.1  2.1  2.1   --    ALB  2.7*  2.8*  2.7*   --    SGOT  30  24  25   --    ALT  19  17  11*   --      Recent Labs      02/06/17 0326 02/05/17 0322 02/04/17   0436   WBC  5.9  5.6  5.3   HGB  9.9*  10.2*  10.1*   HCT  31.9*  32.7*  32.3*   PLT  163  153  124*     No results found for: SDES  Lab Results   Component Value Date/Time    Culture result: NO GROWTH AFTER 22 HOURS 02/05/2017 03:22 AM    Culture result: NO GROWTH 4 DAYS 01/26/2017 03:05 PM    Culture result:  01/25/2017 09:34 PM     LIGHT  STREPTOCOCCI, BETA HEMOLYTIC GROUP C  . .. Penicillin and ampicillin are drugs of choice for treatment of beta-hemolytic streptococcal infections. Susceptibility testing of penicillins and beta-lactams approved by the FDA for treatment of beta-hemolytic streptococcal infections need not be performed routinely, because nonsusceptible isolates are extremely rare.  CLSI 2012      Culture result: MODERATE  CANDIDA TROPICALIS   01/25/2017 09:34 PM    Culture result: NO SIGNIFICANT GROWTH 01/19/2017 01:35 AM     Recent Results (from the past 24 hour(s))   GLUCOSE, POC    Collection Time: 02/05/17 11:00 AM   Result Value Ref Range    Glucose (POC) 148 (H) 65 - 100 mg/dL    Performed by [facility], POC    Collection Time: 02/05/17  4:34 PM   Result Value Ref Range    Glucose (POC) 119 (H) 65 - 100 mg/dL    Performed by [address] [place] [address], POC    Collection Time: 02/05/17  9:46 PM   Result Value Ref Range    Glucose (POC) 163 (H) 65 - 100 mg/dL    Performed by Suzan Selby Graceila Mar    METABOLIC PANEL, COMPREHENSIVE    Collection Time: 02/06/17  3:26 AM   Result Value Ref Range    Sodium 133 (L) 136 - 145 mmol/L    Potassium 3.9 3.5 - 5.1 mmol/L    Chloride 97 97 - 108 mmol/L    CO2 27 21 - 32 mmol/L    Anion gap 9 5 - 15 mmol/L    Glucose 173 (H) 65 - 100 mg/dL    BUN 45 (H) 6 - 20 MG/DL    Creatinine 1.89 (H) 0.70 - 1.30 MG/DL    BUN/Creatinine ratio 24 (H) 12 - 20      GFR est AA 44 (L) >60 ml/min/1.73m2    GFR est non-AA 36 (L) >60 ml/min/1.73m2    Calcium 8.8 8.5 - 10.1 MG/DL    Bilirubin, total 0.5 0.2 - 1.0 MG/DL    ALT (SGPT) 19 12 - 78 U/L    AST (SGOT) 30 15 - 37 U/L    Alk. phosphatase 180 (H) 45 - 117 U/L    Protein, total 7.1 6.4 - 8.2 g/dL    Albumin 2.7 (L) 3.5 - 5.0 g/dL    Globulin 4.4 (H) 2.0 - 4.0 g/dL    A-G Ratio 0.6 (L) 1.1 - 2.2     MAGNESIUM    Collection Time: 02/06/17  3:26 AM   Result Value Ref Range    Magnesium 2.1 1.6 - 2.4 mg/dL   CBC W/O DIFF    Collection Time: 02/06/17  3:26 AM   Result Value Ref Range    WBC 5.9 4.1 - 11.1 K/uL    RBC 4.11 4.10 - 5.70 M/uL    HGB 9.9 (L) 12.1 - 17.0 g/dL    HCT 31.9 (L) 36.6 - 50.3 %    MCV 77.6 (L) 80.0 - 99.0 FL    MCH 24.1 (L) 26.0 - 34.0 PG    MCHC 31.0 30.0 - 36.5 g/dL    RDW 16.3 (H) 11.5 - 14.5 %    PLATELET 561 134 - 020 K/uL   PTT    Collection Time: 02/06/17  3:26 AM   Result Value Ref Range    aPTT 28.7 22.1 - 32.5 sec    aPTT, therapeutic range     58.0 - 77.0 SECS   GLUCOSE, POC    Collection Time: 02/06/17  6:41 AM   Result Value Ref Range    Glucose (POC) 144 (H) 65 - 100 mg/dL    Performed by Flo Salas            I have reviewed the flowsheets. Chart and Pertinent Notes have been reviewed. No change in PMH ,family and social history from Consult note.       Sahra Mars MD

## 2017-02-06 NOTE — CONSULTS
Surgery Consult    Subjective:      Surgical consultation was called on Fernanda Vargas. who is a 61 y.o. male with acute on Chronic CHF, LVH/ CAD, pulmonary HTN, DM  who is admitted to [facility] Cardiac service for treatment of systolic heart failure and work up for CABG vs. Stenting . During this admission pt reported a long hx of  Intermittent  RUQ abdominal pain. Abdominal US has suggested acalculous cholecystitis. Pt is not currently a cardiac surgical candidate  to due cardiac dysfunction, he has been started on IV abx, has a HIDA scan pending to see if perc cholecystostomy tube is warranted and GI is following. Patient's pain is located RUQ and under right ribs. .   Pt reports no nausea and no vomiting and no fever or chills. The pain is described as \"ghost pain\" \"shadow pain, it only hurts sometimes if I push on it or I eat a really big meal\"  and is 5/10 in intensity. Pain is nonradiating Onset was a few years ago. Symptoms have been gradually improving since, he reports the pain is much better and less frequent since this admission,  Excellent appetite, no changes in his bowel movements, voiding ok. Currently no CP, no SOB on O2 , appears comfortable, tolerating regular food. This evening he reports that he feels quite well. Denies melena or hematochezia. Pt denies any significant previous abdominal surgery. Past Medical History   Diagnosis Date    Cardiomyopathy ([address] 1/20/2017     A. Echo (1/19/17):  EF 5-10% with severe GHK,. Mildly dil LA. Mild TR. PASP 46. No past surgical history on file.    Social History     Social History    Marital status:      Spouse name: N/A    Number of children: N/A    Years of education: N/A     Social History Main Topics    Smoking status: Not on file    Smokeless tobacco: Not on file    Alcohol use Not on file    Drug use: Not on file    Sexual activity: Not on file     Other Topics Concern    Not on file     Social History Narrative      Current Facility-Administered Medications   Medication Dose Route Frequency    docusate sodium (COLACE) capsule 100 mg  100 mg Oral DAILY PRN    piperacillin-tazobactam (ZOSYN) 3.375 g in 0.9% sodium chloride (MBP/ADV) 100 mL  3.375 g IntraVENous Q8H    bumetanide (BUMEX) tablet 2 mg  2 mg Oral TID    nicotine (NICODERM CQ) 21 mg/24 hr patch 1 Patch  1 Patch TransDERmal DAILY    glimepiride (AMARYL) tablet 4 mg  4 mg Oral ACB    insulin glargine (LANTUS) injection 10 Units  10 Units SubCUTAneous DAILY    gabapentin (NEURONTIN) capsule 100 mg  100 mg Oral BID    milrinone (PRIMACOR) 20 MG/100 ML D5W infusion  0.3 mcg/kg/min IntraVENous CONTINUOUS    carvedilol (COREG) tablet 3.125 mg  3.125 mg Oral BID WITH MEALS    amiodarone (CORDARONE) tablet 200 mg  200 mg Oral Q12H    bacitracin 500 unit/gram packet 1 Packet  1 Packet Topical PRN    atorvastatin (LIPITOR) tablet 10 mg  10 mg Oral DAILY    0.9% sodium chloride infusion  10 mL/hr IntraVENous CONTINUOUS    acetaminophen (TYLENOL) tablet 650 mg  650 mg Oral Q6H PRN    aspirin delayed-release tablet 81 mg  81 mg Oral DAILY    glucagon (GLUCAGEN) injection 1 mg  1 mg IntraMUSCular PRN    glucose chewable tablet 16 g  4 Tab Oral PRN    insulin lispro (HUMALOG) injection   SubCUTAneous AC&HS    magnesium oxide (MAG-OX) tablet 400 mg  400 mg Oral DAILY    0.9% sodium chloride infusion  3 mL/hr IntraVENous CONTINUOUS    sodium chloride (NS) flush 5-10 mL  5-10 mL IntraVENous Q8H    sodium chloride (NS) flush 5-10 mL  5-10 mL IntraVENous PRN    albuterol (PROVENTIL VENTOLIN) nebulizer solution 2.5 mg  2.5 mg Nebulization Q4H PRN    diphenhydrAMINE (BENADRYL) capsule 25 mg  25 mg Oral QHS PRN    oxyCODONE-acetaminophen (PERCOCET) 5-325 mg per tablet 2 Tab  2 Tab Oral Q4H PRN    ELECTROLYTE REPLACEMENT PROTOCOL  1 Each Other PRN        No Known Allergies    Review of Systems:  Pertinent items are noted in the History of Present Illness. Objective:      Patient Vitals for the past 8 hrs:   BP Temp Pulse Resp SpO2   17 1548 108/63 98.5 °F (36.9 °C) 79 18 94 %   17 1142 117/66 97.7 °F (36.5 °C) 76 18 99 %       Temp (24hrs), Av °F (36.7 °C), Min:97.2 °F (36.2 °C), Max:98.6 °F (37 °C)      Physical Exam:   alert, cooperative, no distress, appears comfortable, O2 in place via NC   Cardiac exam:  Regular rate                         Lungs: clear anterior   Abdomen: soft, protuberant, nondistended, normal bowel sounds, nontender, without guarding, without rebound  Incisions:  na  Neuro: alert, oriented x 3, no defects noted in general exam.  Extremities:  edema BLE with erythema and skin breakdown bilateral shins. Labs:   CBC: Lab Results   Component Value Date/Time    WBC 5.9 2017 03:26 AM    RBC 4.11 2017 03:26 AM    HGB 9.9 2017 03:26 AM    HCT 31.9 2017 03:26 AM    PLATELET 622  03:26 AM     BMP: Lab Results   Component Value Date/Time    Glucose 173 2017 03:26 AM    Sodium 133 2017 03:26 AM    Potassium 3.9 2017 03:26 AM    Chloride 97 2017 03:26 AM    CO2 27 2017 03:26 AM    BUN 45 2017 03:26 AM    Creatinine 1.89 2017 03:26 AM    Calcium 8.8 2017 03:26 AM     CMP:  Lab Results   Component Value Date/Time    Glucose 173 2017 03:26 AM    Sodium 133 2017 03:26 AM    Potassium 3.9 2017 03:26 AM    Chloride 97 2017 03:26 AM    CO2 27 2017 03:26 AM    BUN 45 2017 03:26 AM    Creatinine 1.89 2017 03:26 AM    Calcium 8.8 2017 03:26 AM    Anion gap 9 2017 03:26 AM    BUN/Creatinine ratio 24 2017 03:26 AM    Alk.  phosphatase 180 2017 03:26 AM    Protein, total 7.1 2017 03:26 AM    Albumin 2.7 2017 03:26 AM    Globulin 4.4 2017 03:26 AM    A-G Ratio 0.6 2017 03:26 AM       Radiology review: US    IMPRESSION  IMPRESSION: Distended gallbladder with gallbladder wall thickening,  pericholecystic fluid and tenderness to probe palpation during exam. Findings  are consistent with acalculus cholecystitis.       Assessment:     61 y.o. male who presents with decompensated  CHF, LVH, CAD    DM  Hx RUQ pain, US with findings suggestive of acalculous cholecystitis          Plan:   He is quite comfortable with benign abdominal exam this evening  HIDA is pending   Diet:as tolerated   Antibiotics: Zosyn  Meds: continue cardiac meds   No acute surgical plan, F/U HIDA results   TC Cohn NP     Further plan per Dr. Azael Laura PA-C  Signed By: GREGORY Correa     February 6, 2017

## 2017-02-06 NOTE — CONSULTS
[address] [address]        GASTROENTEROLOGY CONSULTATION NOTE  Massiel Siemens, [facility]  [phone] office  [phone] NP in-hospital cell phone M-F until 4:30  After 5pm or on weekends, please call  for physician on call        NAME:  Estil Patric Mohs. :   1950   MRN:   [id number]       Referring Physician: GREGORY Rodriguez    Consult Date: 2017 4:39 PM    Chief Complaint: abdominal pain     History of Present Illness:  Patient is a 61 y.o. who is seen in consultation at the request of Kwabena Rodriguez for acalculous cholecystitis. Pt PMH as below. Pt presented for CHF decompensation. Having abdominal pain in band across abdomen. Does not get worse with eating, but does with palpation. Pt denies gray/white stool or dark urine. No nausea or vomiting. Stated has had this pain for years but did not know what it was. Denies any known history of gallbladder stones. I have reviewed the emergency room note, hospital admission note, notes by all other physicians who have seen the patient during this hospitalization to date. I have reviewed the problem list and the reason for this hospitalization. I have reviewed the allergies and the medications the patient was taking at home prior to this hospitalization. PMH:  Past Medical History   Diagnosis Date    Cardiomyopathy ([address] 2017     A. Echo (17):  EF 5-10% with severe GHK,. Mildly dil LA. Mild TR. PASP 46. PSH:  No past surgical history on file.     Allergies:  No Known Allergies    Home Medications:  None       Hospital Medications:  Current Facility-Administered Medications   Medication Dose Route Frequency    docusate sodium (COLACE) capsule 100 mg  100 mg Oral DAILY PRN    piperacillin-tazobactam (ZOSYN) 3.375 g in 0.9% sodium chloride (MBP/ADV) 100 mL  3.375 g IntraVENous Q8H    bumetanide (BUMEX) tablet 2 mg  2 mg Oral TID    nicotine (NICODERM CQ) 21 mg/24 hr patch 1 Patch  1 Patch TransDERmal DAILY    glimepiride (AMARYL) tablet 4 mg  4 mg Oral ACB    insulin glargine (LANTUS) injection 10 Units  10 Units SubCUTAneous DAILY    gabapentin (NEURONTIN) capsule 100 mg  100 mg Oral BID    milrinone (PRIMACOR) 20 MG/100 ML D5W infusion  0.3 mcg/kg/min IntraVENous CONTINUOUS    carvedilol (COREG) tablet 3.125 mg  3.125 mg Oral BID WITH MEALS    amiodarone (CORDARONE) tablet 200 mg  200 mg Oral Q12H    bacitracin 500 unit/gram packet 1 Packet  1 Packet Topical PRN    atorvastatin (LIPITOR) tablet 10 mg  10 mg Oral DAILY    0.9% sodium chloride infusion  10 mL/hr IntraVENous CONTINUOUS    acetaminophen (TYLENOL) tablet 650 mg  650 mg Oral Q6H PRN    aspirin delayed-release tablet 81 mg  81 mg Oral DAILY    glucagon (GLUCAGEN) injection 1 mg  1 mg IntraMUSCular PRN    glucose chewable tablet 16 g  4 Tab Oral PRN    insulin lispro (HUMALOG) injection   SubCUTAneous AC&HS    magnesium oxide (MAG-OX) tablet 400 mg  400 mg Oral DAILY    0.9% sodium chloride infusion  3 mL/hr IntraVENous CONTINUOUS    sodium chloride (NS) flush 5-10 mL  5-10 mL IntraVENous Q8H    sodium chloride (NS) flush 5-10 mL  5-10 mL IntraVENous PRN    albuterol (PROVENTIL VENTOLIN) nebulizer solution 2.5 mg  2.5 mg Nebulization Q4H PRN    diphenhydrAMINE (BENADRYL) capsule 25 mg  25 mg Oral QHS PRN    oxyCODONE-acetaminophen (PERCOCET) 5-325 mg per tablet 2 Tab  2 Tab Oral Q4H PRN    ELECTROLYTE REPLACEMENT PROTOCOL  1 Each Other PRN       Social History:  Social History   Substance Use Topics    Smoking status: Not on file    Smokeless tobacco: Not on file    Alcohol use Not on file       Family History:  No family history on file.     Review of Systems:  Constitutional: negative fever, negative chills, negative weight loss  Eyes:   negative visual changes  ENT:   negative sore throat, tongue or lip swelling  Respiratory:  negative cough, negative dyspnea (above baseline)  Cards: negative for chest pain, palpitations, lower extremity edema  GI:   See HPI  :  negative for frequency, dysuria  Integument:  negative for rash and pruritus  Heme:  negative for easy bruising and gum/nose bleeding  Musculoskel: negative for myalgias, back pain and muscle weakness  Neuro: negative for headaches, dizziness, vertigo  Psych:  negative for feelings of anxiety, depression     Objective:   Patient Vitals for the past 8 hrs:   BP Temp Pulse Resp SpO2   02/06/17 1548 108/63 98.5 °F (36.9 °C) 79 18 94 %   02/06/17 1142 117/66 97.7 °F (36.5 °C) 76 18 99 %     02/06 0701 - 02/06 1900  In: 1753.6 [P.O.:480; I.V.:1273.6]  Out: 1475 [Urine:1475]  02/04 1901 - 02/06 0700  In: 1966.8 [P.O.:1170; I.V.:796.8]  Out: 5375 [Urine:5375]    EXAM:     CONST:  Pleasant male in bed in no acute distress   NEURO:  alert and oriented x 3   HEENT: EOMI, no scleral icterus   LUNGS: clear to ausculation, (-) wheeze   CARD:  regular rate and rhythm, S1 S2   ABD:  soft, tender, no rebound, bowel sounds (+) all 4 quadrants, no masses, non distended   EXT:  ++ edema, warm   PSYCH: full, not anxious     Data Review     Recent Labs      02/06/17 0326 02/05/17 0322   WBC  5.9  5.6   HGB  9.9*  10.2*   HCT  31.9*  32.7*   PLT  163  153     Recent Labs      02/06/17 0326 02/05/17 0322   NA  133*  136   K  3.9  4.2   CL  97  97   CO2  27  29   BUN  45*  50*   CREA  1.89*  1.86*   GLU  173*  130*   CA  8.8  9.0     Recent Labs      02/06/17 0326 02/05/17 0322   SGOT  30  24   AP  180*  175*   TP  7.1  7.1   ALB  2.7*  2.8*   GLOB  4.4*  4.3*     Recent Labs      02/06/17 0326 02/05/17 0322   APTT  28.7  29.3          Assessment:   · Acalculous cholecystitis: no signs of obstruction, obvious cholecystitis on ultrasound. MRCP and IR rell drain placement ordered. No laboratory findings of obstruction.      Patient Active Problem List   Diagnosis Code    Acute systolic (congestive) heart failure (HCC) I50.21    Bilateral lower extremity edema R60.0    Shortness of breath R06.02    Acute renal failure (ARF) (HCC) N17.9    Hyperglycemia due to type 2 diabetes mellitus (HCC) E11.65    Venous stasis dermatitis of both lower extremities I83.11, I83.12    Hypoxia R09.02    Pulmonary edema J81.1    Sinus tachycardia R00.0    Ischemic cardiomyopathy R72.8    Systolic heart failure (HCC) I50.20     Plan:   · Follow up on MRCP findings  · Agree with drain placement  · Surgery consult ordered  · Pain medication as needed  · Thank you for allowing me to participate in care of Kacey Caro. Signed By: Shannon Raza. Janet Heath NP     2/6/2017  4:39 PM       GI Attending: Patient seen and examined. TTP in RUQ. US with acalculous cholecystitis. Will request surgery consultation. If patient is not a candidate for cholecystectomy, would consider IR-guided percutaneous cholecystostomy tube. Will defer to surgery about further imaging. Would consider HIDA scan over MRCP based on clinical presentation and labs. Will follow. Evangelist Angela MD

## 2017-02-06 NOTE — PROGRESS NOTES
NUTRITION- DIETETIC tECHnICIAN    Pt seen for:       [x]                  Rescreen  []                  Food preferences/tolerances  []                  Food Allergies  []                  PO intake check  []                  Supplements  []                  Diet order clarification  []                  Education  []                  Other     Rescreen:    [x]                  Not at Nutrition Risk, rescreen per screening protocol  []                  At Nutrition Risk- RD referral         SUBJECTIVE/OBJECTIVE:     Information obtained from:  patient      Diet:  Regular Consistent Carbohydrate 2000 Kcal, 2 gm Na, 2000 ml FR - Double proteins      Intake: excellent    Patient Vitals for the past 100 hrs:   % Diet Eaten   02/05/17 1102 80 %   02/05/17 0836 50 %   02/04/17 1724 100 %   02/04/17 0945 100 %   02/02/17 1859 100 %   02/02/17 1302 100 %       Weight Changes: Wt Readings from Last 3 Encounters:   02/06/17 85 kg (187 lb 6.3 oz)   01/20/17 81 kg (178 lb 9.2 oz)     Problems Identified      [x]                  Poor fitting dentures makes eating some foods difficult but refused a texture change. Complains of being hungry frequently . Suggested patient order double protein at meals as recommended previously by RD. Will change texture of some snacks per patient preference as too difficult to chew.    []                  Specified food preferences   []                  Dislikes supplements              []                  Allergies:   []                  Difficulty chewing      []                  Dentition    []                  Nausea/Vomiting   []                  Constipation   []                  Diarrhea    PLAN:     [x]                   Continue current diet and encourage intake  [x]                   Change texture of snack foods   []                   Dislikes supplements will try a substitution  []                   Modify diet for food allergies  []                   Adjust texture due to difficulty chewing   []                   Educated patient  []                   RD Referral  [x]                   Rescreen per screening protocol          Shireen Tapia DTR

## 2017-02-06 NOTE — PROGRESS NOTES
Follow up visit with patient who has had surgery delayed. Shared some disappointment about that turn of events. Also shared some memories of his childhood, early marriage, and past indiscretions. Remains hopeful while aware of his physical frailty. Desires continued visits. Will visit as able.      [facility]  [id number] ([id number])

## 2017-02-06 NOTE — WOUND CARE
WOCN Note:   reconsult placed by RN for leg wound care; Chart reviewed prior to assessment;     Assessment:   Patient is alert, verbal, patient stated right lower leg wound is tender; Patient repositioned from sitting on side of bed to supine on total care bed; Pt tolerated assessment and procedure well. Not able to palpate DP pulses bilaterally, feet warm, pink with good capillary refill; Wound Assessment: present on admission. 1. Right medial lower leg wound measured 4.5cm x 2.4cm x 0.1cm area is soft, yellow scab that is blistered on edges, small serous drainage; cleansed with Carraklenz, applied Carrasyn gel, covered with mepilex;   2. Left lower leg with scattered dry, stable scabs;   3.  Right heel with minute red places with scant blood from where patient reported that he peeled skin; mepilex applied; Wound Care Recommendations:    1. Daily cleanse right medial lower leg with Carraklenz, apply Carrasyn gel, cover with mepilex; Skin Care/Prevention Recommendations:  1. Continue continence checks;    2. Reposition patient approximately every 2 hours. 3. Assess/protect skin in contact with medical devices. Keep skin moisturized. 4. Float Heels with pillow under calves. 5. Continue on total care bed for pressure redistribution;   6.  Ensure that patient is repositioning in chair shifting weight approximately every 15 minutes and standing up every hour;       Discussed above assessment and recommendations with Sulaiman Suresh (RN);    Caden Snow RN, BSN, [facility]  Certified Wound, Ostomy, Continence Nurse  office [phone]  pager [phone] [facility], RN                    Makayla Meyer Spray, dry, apply thin film of carrasyn gel to right shin, right medial

## 2017-02-06 NOTE — PROGRESS NOTES
Met with the patient to touch base. He reports his feelings have not changed; he is content with remaining a DNR and is hopeful for his upcoming surgery. Shared it is likely postponed due to abdominal pain.  offered emotional support. Completed a care card application with the patient. He informed  his wife received a phone call indicating he is not being screened for disability/ Medicaid due to being over the income threshold.  spoke with Choco Naranjo of [facility] who confirmed this information. Will follow up regarding disability determination and Medicaid application.     Jeanette De Leon, MSW, LCSW    Clinical    Jennifer Roman [phone]

## 2017-02-06 NOTE — PROGRESS NOTES
Bedside shift change report given to Neda Perez (oncoming nurse) by Solo Bueno (offgoing nurse). Report included the following information SBAR, Procedure Summary, Intake/Output, MAR and Recent Results.

## 2017-02-07 ENCOUNTER — APPOINTMENT (OUTPATIENT)
Dept: NUCLEAR MEDICINE | Age: 67
DRG: 215 | End: 2017-02-07
Attending: PHYSICIAN ASSISTANT
Payer: SELF-PAY

## 2017-02-07 ENCOUNTER — APPOINTMENT (OUTPATIENT)
Dept: GENERAL RADIOLOGY | Age: 67
DRG: 215 | End: 2017-02-07
Attending: PHYSICIAN ASSISTANT
Payer: SELF-PAY

## 2017-02-07 LAB
ALBUMIN SERPL BCP-MCNC: 2.7 G/DL (ref 3.5–5)
ALBUMIN/GLOB SERPL: 0.7 {RATIO} (ref 1.1–2.2)
ALP SERPL-CCNC: 161 U/L (ref 45–117)
ALT SERPL-CCNC: 19 U/L (ref 12–78)
ANION GAP BLD CALC-SCNC: 6 MMOL/L (ref 5–15)
APTT PPP: 28.3 SEC (ref 22.1–32.5)
AST SERPL W P-5'-P-CCNC: 23 U/L (ref 15–37)
BILIRUB SERPL-MCNC: 0.4 MG/DL (ref 0.2–1)
BUN SERPL-MCNC: 45 MG/DL (ref 6–20)
BUN/CREAT SERPL: 22 (ref 12–20)
CALCIUM SERPL-MCNC: 8.8 MG/DL (ref 8.5–10.1)
CHLORIDE SERPL-SCNC: 98 MMOL/L (ref 97–108)
CO2 SERPL-SCNC: 30 MMOL/L (ref 21–32)
CREAT SERPL-MCNC: 2.06 MG/DL (ref 0.7–1.3)
ERYTHROCYTE [DISTWIDTH] IN BLOOD BY AUTOMATED COUNT: 16.4 % (ref 11.5–14.5)
GLOBULIN SER CALC-MCNC: 4 G/DL (ref 2–4)
GLUCOSE BLD STRIP.AUTO-MCNC: 175 MG/DL (ref 65–100)
GLUCOSE BLD STRIP.AUTO-MCNC: 283 MG/DL (ref 65–100)
GLUCOSE BLD STRIP.AUTO-MCNC: 80 MG/DL (ref 65–100)
GLUCOSE SERPL-MCNC: 96 MG/DL (ref 65–100)
HCT VFR BLD AUTO: 30.6 % (ref 36.6–50.3)
HGB BLD-MCNC: 9.4 G/DL (ref 12.1–17)
MAGNESIUM SERPL-MCNC: 2 MG/DL (ref 1.6–2.4)
MCH RBC QN AUTO: 24 PG (ref 26–34)
MCHC RBC AUTO-ENTMCNC: 30.7 G/DL (ref 30–36.5)
MCV RBC AUTO: 78.1 FL (ref 80–99)
PLATELET # BLD AUTO: 164 K/UL (ref 150–400)
POTASSIUM SERPL-SCNC: 3.7 MMOL/L (ref 3.5–5.1)
PROT SERPL-MCNC: 6.7 G/DL (ref 6.4–8.2)
RBC # BLD AUTO: 3.92 M/UL (ref 4.1–5.7)
SERVICE CMNT-IMP: ABNORMAL
SERVICE CMNT-IMP: ABNORMAL
SERVICE CMNT-IMP: NORMAL
SODIUM SERPL-SCNC: 134 MMOL/L (ref 136–145)
THERAPEUTIC RANGE,PTTT: NORMAL SECS (ref 58–77)
WBC # BLD AUTO: 5.2 K/UL (ref 4.1–11.1)

## 2017-02-07 PROCEDURE — 74011250637 HC RX REV CODE- 250/637: Performed by: NURSE PRACTITIONER

## 2017-02-07 PROCEDURE — 74011250636 HC RX REV CODE- 250/636: Performed by: THORACIC SURGERY (CARDIOTHORACIC VASCULAR SURGERY)

## 2017-02-07 PROCEDURE — 74011250636 HC RX REV CODE- 250/636: Performed by: PHYSICIAN ASSISTANT

## 2017-02-07 PROCEDURE — 74011250637 HC RX REV CODE- 250/637: Performed by: INTERNAL MEDICINE

## 2017-02-07 PROCEDURE — 71020 XR CHEST PA LAT: CPT

## 2017-02-07 PROCEDURE — 78227 HEPATOBIL SYST IMAGE W/DRUG: CPT

## 2017-02-07 PROCEDURE — 77010033678 HC OXYGEN DAILY

## 2017-02-07 PROCEDURE — 74011000258 HC RX REV CODE- 258: Performed by: THORACIC SURGERY (CARDIOTHORACIC VASCULAR SURGERY)

## 2017-02-07 PROCEDURE — 80053 COMPREHEN METABOLIC PANEL: CPT | Performed by: PHYSICIAN ASSISTANT

## 2017-02-07 PROCEDURE — 83735 ASSAY OF MAGNESIUM: CPT | Performed by: PHYSICIAN ASSISTANT

## 2017-02-07 PROCEDURE — 65660000000 HC RM CCU STEPDOWN

## 2017-02-07 PROCEDURE — 74011250636 HC RX REV CODE- 250/636: Performed by: INTERNAL MEDICINE

## 2017-02-07 PROCEDURE — 82962 GLUCOSE BLOOD TEST: CPT

## 2017-02-07 PROCEDURE — 74011636637 HC RX REV CODE- 636/637: Performed by: NURSE PRACTITIONER

## 2017-02-07 PROCEDURE — 85027 COMPLETE CBC AUTOMATED: CPT | Performed by: PHYSICIAN ASSISTANT

## 2017-02-07 PROCEDURE — 36415 COLL VENOUS BLD VENIPUNCTURE: CPT | Performed by: PHYSICIAN ASSISTANT

## 2017-02-07 PROCEDURE — 74011000258 HC RX REV CODE- 258: Performed by: PHYSICIAN ASSISTANT

## 2017-02-07 PROCEDURE — 74011250637 HC RX REV CODE- 250/637: Performed by: PHYSICIAN ASSISTANT

## 2017-02-07 PROCEDURE — 85730 THROMBOPLASTIN TIME PARTIAL: CPT | Performed by: PHYSICIAN ASSISTANT

## 2017-02-07 RX ORDER — SODIUM CHLORIDE 9 MG/ML
25 INJECTION, SOLUTION INTRAVENOUS
Status: COMPLETED | OUTPATIENT
Start: 2017-02-07 | End: 2017-02-09

## 2017-02-07 RX ORDER — BUMETANIDE 1 MG/1
2 TABLET ORAL 2 TIMES DAILY
Status: DISCONTINUED | OUTPATIENT
Start: 2017-02-07 | End: 2017-02-08

## 2017-02-07 RX ORDER — SODIUM CHLORIDE 0.9 % (FLUSH) 0.9 %
10 SYRINGE (ML) INJECTION
Status: COMPLETED | OUTPATIENT
Start: 2017-02-07 | End: 2017-02-07

## 2017-02-07 RX ADMIN — PIPERACILLIN AND TAZOBACTAM 3.38 G: 3; .375 INJECTION, POWDER, FOR SOLUTION INTRAVENOUS at 21:25

## 2017-02-07 RX ADMIN — OXYCODONE HYDROCHLORIDE AND ACETAMINOPHEN 2 TABLET: 5; 325 TABLET ORAL at 06:06

## 2017-02-07 RX ADMIN — PIPERACILLIN AND TAZOBACTAM 3.38 G: 3; .375 INJECTION, POWDER, FOR SOLUTION INTRAVENOUS at 14:24

## 2017-02-07 RX ADMIN — Medication 10 ML: at 14:25

## 2017-02-07 RX ADMIN — BUMETANIDE 2 MG: 1 TABLET ORAL at 14:26

## 2017-02-07 RX ADMIN — OXYCODONE HYDROCHLORIDE AND ACETAMINOPHEN 2 TABLET: 5; 325 TABLET ORAL at 18:47

## 2017-02-07 RX ADMIN — AMIODARONE HYDROCHLORIDE 200 MG: 200 TABLET ORAL at 21:25

## 2017-02-07 RX ADMIN — OXYCODONE HYDROCHLORIDE AND ACETAMINOPHEN 2 TABLET: 5; 325 TABLET ORAL at 22:33

## 2017-02-07 RX ADMIN — AMIODARONE HYDROCHLORIDE 200 MG: 200 TABLET ORAL at 14:25

## 2017-02-07 RX ADMIN — ATORVASTATIN CALCIUM 10 MG: 10 TABLET, FILM COATED ORAL at 14:25

## 2017-02-07 RX ADMIN — BUMETANIDE 2 MG: 1 TABLET ORAL at 18:47

## 2017-02-07 RX ADMIN — CARVEDILOL 3.12 MG: 3.12 TABLET, FILM COATED ORAL at 14:26

## 2017-02-07 RX ADMIN — OXYCODONE HYDROCHLORIDE AND ACETAMINOPHEN 2 TABLET: 5; 325 TABLET ORAL at 14:24

## 2017-02-07 RX ADMIN — GABAPENTIN 100 MG: 100 CAPSULE ORAL at 14:25

## 2017-02-07 RX ADMIN — PIPERACILLIN AND TAZOBACTAM 3.38 G: 3; .375 INJECTION, POWDER, FOR SOLUTION INTRAVENOUS at 06:04

## 2017-02-07 RX ADMIN — GLIMEPIRIDE 4 MG: 2 TABLET ORAL at 14:26

## 2017-02-07 RX ADMIN — Medication 81 MG: at 14:26

## 2017-02-07 RX ADMIN — Medication 10 ML: at 21:25

## 2017-02-07 RX ADMIN — Medication 400 MG: at 14:25

## 2017-02-07 RX ADMIN — INSULIN LISPRO 3 UNITS: 100 INJECTION, SOLUTION INTRAVENOUS; SUBCUTANEOUS at 19:09

## 2017-02-07 RX ADMIN — MILRINONE LACTATE 0.3 MCG/KG/MIN: 200 INJECTION, SOLUTION INTRAVENOUS at 18:45

## 2017-02-07 RX ADMIN — MILRINONE LACTATE 0.3 MCG/KG/MIN: 200 INJECTION, SOLUTION INTRAVENOUS at 02:38

## 2017-02-07 RX ADMIN — GABAPENTIN 100 MG: 100 CAPSULE ORAL at 18:47

## 2017-02-07 RX ADMIN — Medication 10 ML: at 06:04

## 2017-02-07 RX ADMIN — SINCALIDE 1.86 MCG: 5 INJECTION, POWDER, LYOPHILIZED, FOR SOLUTION INTRAVENOUS at 11:32

## 2017-02-07 RX ADMIN — SODIUM CHLORIDE 25 ML: 900 INJECTION, SOLUTION INTRAVENOUS at 12:00

## 2017-02-07 RX ADMIN — OXYCODONE HYDROCHLORIDE AND ACETAMINOPHEN 2 TABLET: 5; 325 TABLET ORAL at 01:08

## 2017-02-07 NOTE — PROGRESS NOTES
[address] at patient bedside. Okay to hold all morning medications until after Nuclear med scan. Hold this morning Lantus 10units due to BG of 80mg/dl. Chest XR will be done today. 3006- Patient's oxygen via nasal cannula titrated to 1 liter- patient sat 99 to 100%  0950- Patient's oxygen via nasal cannula removed- Patient is on room air with 100% oxygen saturation. Patient uses ncentive spirometer going as high as 2500.   1030- Oxygen remains 100% on room air. Patient says he would like us to check it tonight. He is worried he may need oxygen while sleeping.

## 2017-02-07 NOTE — PROGRESS NOTES
[facility] Progress Note      NAME:  Fernanda Wang. :   1950   MRN:   [id number]   PCP:  None  CARD:   Dr. Theron Escamilla    Date:  2017     Fernanda Wang. is a 61 y.o. male with a who presented for further evaluation of severe CAD and systolic heart failure. Subjective:   He was initially seen at [facility] 3 years ago and told that he had heart failure with LVEF 30%. He was lost to follow up due to lack of insurance and has not been seen by a physician in the interim. He complains of progressive fatigue, NAVARRO, PND, and edema over the past year, prompting presentation to [facility]. Pt has some SOB which hasn't increased. Denies N/V. Past 24 hours:  No significant changes - HIDA scan today  Remains on milrinone gtt    Objective:     Visit Vitals    /67 (BP 1 Location: Right arm, BP Patient Position: Sitting)    Pulse 82    Temp 98.3 °F (36.8 °C)    Resp 18    Ht 5' 9\" (1.753 m)    Wt 205 lb 0.4 oz (93 kg)    SpO2 96%    BMI 30.28 kg/m2        Physical Exam:    Gen:  WA, WN, NAD  HEENT:  EOMI  Neck:  (+) JVD  CVS:  S1/S2,  +S3  Pul:  Decreased L base, soft R basilar crackles  Abd:  Soft, minimal tenderness upper quadrants, decreased since yesterday  Ext:  1+ b/l LE edema, multiple bandages in place, +erythema  Neuro:  No obvious deficits     1901 -  0700  In: .7 [P.O.:720; I.V.:1308.7]  Out: 5375 [Urine:5375]  O2 Flow Rate (L/min): 2 l/min (pt puts on self when sleeping ) O2 Device: Nasal cannula  Temp (24hrs), Av.1 °F (36.7 °C), Min:97.5 °F (36.4 °C), Max:98.5 °F (36.9 °C)    Past History:     Past Medical History   Diagnosis Date    Cardiomyopathy ([address] 2017     A. Echo (1/19/17):  EF 5-10% with severe GHK,. Mildly dil LA. Mild TR. PASP 46. No past surgical history on file.     Social History   Substance Use Topics    Smoking status: Not on file    Smokeless tobacco: Not on file    Alcohol use Not on file        No family history on file. Allergies:   No Known Allergies       Data Review:     CXR:   CXR Results  (Last 48 hours)    None        Echocardiogram:   Echo Results  (Last 48 hours)    None        No results found for this visit on 01/20/17. ECG: sinus rhythm on telemetry  LABS:  Recent Results (from the past 24 hour(s))   GLUCOSE, POC    Collection Time: 02/06/17 11:41 AM   Result Value Ref Range    Glucose (POC) 148 (H) 65 - 100 mg/dL    Performed by Héctor Velasquez, POC    Collection Time: 02/06/17  4:57 PM   Result Value Ref Range    Glucose (POC) 203 (H) 65 - 100 mg/dL    Performed by Logan Juarez    GLUCOSE, POC    Collection Time: 02/06/17  9:19 PM   Result Value Ref Range    Glucose (POC) 215 (H) 65 - 100 mg/dL    Performed by [address] [place] [address]    Collection Time: 02/07/17  2:49 AM   Result Value Ref Range    Sodium 134 (L) 136 - 145 mmol/L    Potassium 3.7 3.5 - 5.1 mmol/L    Chloride 98 97 - 108 mmol/L    CO2 30 21 - 32 mmol/L    Anion gap 6 5 - 15 mmol/L    Glucose 96 65 - 100 mg/dL    BUN 45 (H) 6 - 20 MG/DL    Creatinine 2.06 (H) 0.70 - 1.30 MG/DL    BUN/Creatinine ratio 22 (H) 12 - 20      GFR est AA 40 (L) >60 ml/min/1.73m2    GFR est non-AA 33 (L) >60 ml/min/1.73m2    Calcium 8.8 8.5 - 10.1 MG/DL    Bilirubin, total 0.4 0.2 - 1.0 MG/DL    ALT (SGPT) 19 12 - 78 U/L    AST (SGOT) 23 15 - 37 U/L    Alk.  phosphatase 161 (H) 45 - 117 U/L    Protein, total 6.7 6.4 - 8.2 g/dL    Albumin 2.7 (L) 3.5 - 5.0 g/dL    Globulin 4.0 2.0 - 4.0 g/dL    A-G Ratio 0.7 (L) 1.1 - 2.2     MAGNESIUM    Collection Time: 02/07/17  2:49 AM   Result Value Ref Range    Magnesium 2.0 1.6 - 2.4 mg/dL   CBC W/O DIFF    Collection Time: 02/07/17  2:49 AM   Result Value Ref Range    WBC 5.2 4.1 - 11.1 K/uL    RBC 3.92 (L) 4.10 - 5.70 M/uL    HGB 9.4 (L) 12.1 - 17.0 g/dL    HCT 30.6 (L) 36.6 - 50.3 %    MCV 78.1 (L) 80.0 - 99.0 FL    MCH 24.0 (L) 26.0 - 34.0 PG    MCHC 30.7 30.0 - 36.5 g/dL    RDW 16.4 (H) 11.5 - 14.5 %    PLATELET 832 812 - 975 K/uL   PTT    Collection Time: 02/07/17  2:49 AM   Result Value Ref Range    aPTT 28.3 22.1 - 32.5 sec    aPTT, therapeutic range     58.0 - 77.0 SECS   GLUCOSE, POC    Collection Time: 02/07/17  7:19 AM   Result Value Ref Range    Glucose (POC) 80 65 - 100 mg/dL    Performed by Fan Ritter      All Micro Results     Procedure Component Value Units Date/Time    CULTURE, BLOOD, PAIRED [[id number]] Collected:  02/05/17 0322    Order Status:  Completed Specimen:  Blood Updated:  02/07/17 0618     Special Requests: NO SPECIAL REQUESTS        Culture result: NO GROWTH 2 DAYS       CULTURE, BODY FLUID Lay Clark [[id number]] Collected:  01/26/17 1505    Order Status:  Completed Specimen:  Thoracentesis Updated:  01/30/17 1057     Special Requests: NO SPECIAL REQUESTS        GRAM STAIN OCCASIONAL  WBCS SEEN         NO ORGANISMS SEEN        Culture result: NO GROWTH 4 DAYS       CULTURE, Rhonda Smart STAIN [[id number]] Collected:  01/25/17 2134    Order Status:  Completed Specimen:  Leg Updated:  01/27/17 1440     Special Requests: NO SPECIAL REQUESTS        GRAM STAIN RARE  WBCS SEEN         NO ORGANISMS SEEN        Culture result: LIGHT  STREPTOCOCCI, BETA HEMOLYTIC GROUP C  . .. Penicillin and ampicillin are drugs of choice for treatment of beta-hemolytic streptococcal infections. Susceptibility testing of penicillins and beta-lactams approved by the FDA for treatment of beta-hemolytic streptococcal infections need not be performed routinely, because nonsusceptible isolates are extremely rare. CLSI 2012         MODERATE  BETTIE TROPICALIS             Specimen Source   1: Pleural Fluid   CYTOLOGIC INTERPRETATION:   Scattered mesothelial cells with degenerative changes and mixed inflammatory cells   General Categorization   No cells diagnostic for malignancy   Specimen Adequacy   Satisfactory for evaluation        IMPRESSION  1.  Markedly dilated left ventricle with 3-D end-diastolic volume index to body  surface area of 153 mL/sq m. Mild eccentric left ventricular hypertrophy with  3-D mass index to body surface area of 85 g/sq m. Severe left ventricular  systolic dysfunction. Severe global hypokinesis with regional variation. 3-D  LVEF 10%. 2. Moderately dilated right ventricle by 3-D volumetric assessment. RV end  diastolic volume indexed to body surface area of 138 mL/sq m. Moderate to severe  right ventricular systolic dysfunction. Global hypokinesis. 3-D RVEF 25%. 3. Apical a tethered mitral valve leaflets. Mild mitral regurgitation. 4. Moderately severe 3+ tricuspid regurgitation. 5. On LGE study, the anterior wall, anteroseptal wall, anteroapical wall, and  anterior lateral wall are largely viable without significant myocardial  infarction. The entire inferior wall and inferoseptal wall are completely viable  without any infarct. The base to mid inferolateral wall demonstrate a near  transmural greater than 75% thickness infarct with minimal or no viable  myocardium in this territory. The distal inferolateral wall, apical lateral wall  does not demonstrate any infarct and are largely viable. Based on the viability  imaging, the entire LAD territory is largely viable and should recover upon  revascularization. The entire RCA territory is largely viable and should recover  upon revascularization. The LCx territory demonstrate medium-size infarct with  limited viability. 6. Large left-sided pleural effusion with passive atelectasis of the left lung. 7. Dilated branch pulmonary arteries suggest pulmonary hypertension. 8. Markedly dilated left atrium measuring 59 x 55 mm. Moderately dilated right  atrium measuring 46 x 56 mm.     Medications reviewed:    Current Facility-Administered Medications   Medication Dose Route Frequency    docusate sodium (COLACE) capsule 100 mg  100 mg Oral DAILY PRN    piperacillin-tazobactam (ZOSYN) 3.375 g in 0.9% sodium chloride (MBP/ADV) 100 mL  3.375 g IntraVENous Q8H    bumetanide (BUMEX) tablet 2 mg  2 mg Oral TID    nicotine (NICODERM CQ) 21 mg/24 hr patch 1 Patch  1 Patch TransDERmal DAILY    glimepiride (AMARYL) tablet 4 mg  4 mg Oral ACB    insulin glargine (LANTUS) injection 10 Units  10 Units SubCUTAneous DAILY    gabapentin (NEURONTIN) capsule 100 mg  100 mg Oral BID    milrinone (PRIMACOR) 20 MG/100 ML D5W infusion  0.3 mcg/kg/min IntraVENous CONTINUOUS    carvedilol (COREG) tablet 3.125 mg  3.125 mg Oral BID WITH MEALS    amiodarone (CORDARONE) tablet 200 mg  200 mg Oral Q12H    bacitracin 500 unit/gram packet 1 Packet  1 Packet Topical PRN    atorvastatin (LIPITOR) tablet 10 mg  10 mg Oral DAILY    0.9% sodium chloride infusion  10 mL/hr IntraVENous CONTINUOUS    acetaminophen (TYLENOL) tablet 650 mg  650 mg Oral Q6H PRN    aspirin delayed-release tablet 81 mg  81 mg Oral DAILY    glucagon (GLUCAGEN) injection 1 mg  1 mg IntraMUSCular PRN    glucose chewable tablet 16 g  4 Tab Oral PRN    insulin lispro (HUMALOG) injection   SubCUTAneous AC&HS    magnesium oxide (MAG-OX) tablet 400 mg  400 mg Oral DAILY    0.9% sodium chloride infusion  3 mL/hr IntraVENous CONTINUOUS    sodium chloride (NS) flush 5-10 mL  5-10 mL IntraVENous Q8H    sodium chloride (NS) flush 5-10 mL  5-10 mL IntraVENous PRN    albuterol (PROVENTIL VENTOLIN) nebulizer solution 2.5 mg  2.5 mg Nebulization Q4H PRN    diphenhydrAMINE (BENADRYL) capsule 25 mg  25 mg Oral QHS PRN    oxyCODONE-acetaminophen (PERCOCET) 5-325 mg per tablet 2 Tab  2 Tab Oral Q4H PRN    ELECTROLYTE REPLACEMENT PROTOCOL  1 Each Other PRN     PFTs  FVC 2.62 60% predicted             FEV1 1.51 50% predicted             FEV1/FVC 54.57    Ltd abd ultrasouond - FINDINGS: The gallbladder is moderately distended. The gallbladder wall appears  thickened measuring 5 mm. There is a small amount of pericholecystic fluid.   There is tenderness to probe palpation over the gallbladder fossa. No gallstone  is shown. No intrahepatic biliary ductal dilation is demonstrated. Common bile  duct diameter measures 10 mm, similar to that measured previously.     The liver is again shown to be enlarged and diffusely echogenic in texture  though without indication of focal hepatic mass lesion. Portal venous flow is  hepatopedal. The visualized pancreatic head and right kidney appear normal with  right renal length 10.6 cm.     IMPRESSION  IMPRESSION: Distended gallbladder with gallbladder wall thickening,  pericholecystic fluid and tenderness to probe palpation during exam. Findings  are consistent with acalculus cholecystitis. IMPRESSION/Plan:   Acute on chronic systolic heart failure (ICM) - Stage D, NYHA Class IV - continue Milrinone at   0.3 mcg/kg/min, Continue low dose Coreg and Bumex, Strict I/Os, Replete electrolytes prn, Hold ACE-I due to CHACORTA on CKD. Daily weights - don't trust weight from today - will have nursing staff repeat. Severe native coronary artery disease w/ viability - on ASA, statin, low dose BB; no ACEi d/t renal dysfunction, tentative plan for high risk PCI w/ Impella support on Thursday at 8:15am    Acalculus Cholecystitis - seen by GI, surgery and IR - HIDA scan pending. Treatment plan pending results of scan. L pleural effusion - stable, cont. Current dose of diuretics. PA&lateral CXR today    CECILIA - REJI positive, platlets 164 - close f/u NO HEPARIN    Hyponatremia - stable, cont. To monitor    Diabetes Mellitus - cont. Current treatment coverage, close monitor of BS. DTC following - hold AM Lantus as patient is NPO    Cellulitis - cont. Po abx per ID.   Legs improving    H/o Tobacco abuse - smoking cessation, nicotine patch    CHACORTA on CKD3 - renal following - decreased diuretics to BID bump in BUN/Cr    Anemia - stable, monitor    Pulmonary HTN -        Pt seen with Dr. Javier Valdez and Dr. Shonna Carmona, PA-C  [address] patient, agree with above  Risk of morbidity and mortality - high  Medical decision making - high complexity

## 2017-02-07 NOTE — PROGRESS NOTES
[address]  [address], [address]       GI PROGRESS NOTE  Anand Mejía, [facility]  [phone] office  [phone] NP in-hospital cell phone M-F until 4:30  After 5pm or on weekends, please call  for physician on call      NAME: Fernanda Tran. :  1950   MRN:  [id number]       Subjective:     Complaining of foot pain after wound care. Objective:     VITALS:   Last 24hrs VS reviewed since prior progress note. Most recent are:  Visit Vitals    /67 (BP 1 Location: Right arm, BP Patient Position: Sitting)    Pulse 82    Temp 98.3 °F (36.8 °C)    Resp 18    Ht 5' 9\" (1.753 m)    Wt 93 kg (205 lb 0.4 oz)    SpO2 96%    BMI 30.28 kg/m2       PHYSICAL EXAM:  General: Cooperative, no acute distress    Neurologic:  Alert and oriented X 3. HEENT: PERRL, EOMI. Lungs:  CTA bilaterally. No wheezing  Heart:  S1 S2, regular rhythm, no murmur   Abdomen: Soft, distended, tender with deep palpation. +Bowel sounds  Extremities: No edema  Psych:   Good insight. Not anxious or agitated.     Lab Data Reviewed:     Recent Results (from the past 24 hour(s))   GLUCOSE, POC    Collection Time: 17  4:57 PM   Result Value Ref Range    Glucose (POC) 203 (H) 65 - 100 mg/dL    Performed by Miguel Banks    GLUCOSE, POC    Collection Time: 17  9:19 PM   Result Value Ref Range    Glucose (POC) 215 (H) 65 - 100 mg/dL    Performed by [address] [place] [address]    Collection Time: 17  2:49 AM   Result Value Ref Range    Sodium 134 (L) 136 - 145 mmol/L    Potassium 3.7 3.5 - 5.1 mmol/L    Chloride 98 97 - 108 mmol/L    CO2 30 21 - 32 mmol/L    Anion gap 6 5 - 15 mmol/L    Glucose 96 65 - 100 mg/dL    BUN 45 (H) 6 - 20 MG/DL    Creatinine 2.06 (H) 0.70 - 1.30 MG/DL    BUN/Creatinine ratio 22 (H) 12 - 20      GFR est AA 40 (L) >60 ml/min/1.73m2    GFR est non-AA 33 (L) >60 ml/min/1.73m2    Calcium 8.8 8.5 - 10.1 MG/DL    Bilirubin, total 0.4 0.2 - 1.0 MG/DL    ALT (SGPT) 19 12 - 78 U/L    AST (SGOT) 23 15 - 37 U/L    Alk. phosphatase 161 (H) 45 - 117 U/L    Protein, total 6.7 6.4 - 8.2 g/dL    Albumin 2.7 (L) 3.5 - 5.0 g/dL    Globulin 4.0 2.0 - 4.0 g/dL    A-G Ratio 0.7 (L) 1.1 - 2.2     MAGNESIUM    Collection Time: 02/07/17  2:49 AM   Result Value Ref Range    Magnesium 2.0 1.6 - 2.4 mg/dL   CBC W/O DIFF    Collection Time: 02/07/17  2:49 AM   Result Value Ref Range    WBC 5.2 4.1 - 11.1 K/uL    RBC 3.92 (L) 4.10 - 5.70 M/uL    HGB 9.4 (L) 12.1 - 17.0 g/dL    HCT 30.6 (L) 36.6 - 50.3 %    MCV 78.1 (L) 80.0 - 99.0 FL    MCH 24.0 (L) 26.0 - 34.0 PG    MCHC 30.7 30.0 - 36.5 g/dL    RDW 16.4 (H) 11.5 - 14.5 %    PLATELET 206 075 - 149 K/uL   PTT    Collection Time: 02/07/17  2:49 AM   Result Value Ref Range    aPTT 28.3 22.1 - 32.5 sec    aPTT, therapeutic range     58.0 - 77.0 SECS   GLUCOSE, POC    Collection Time: 02/07/17  7:19 AM   Result Value Ref Range    Glucose (POC) 80 65 - 100 mg/dL    Performed by Alden Felder          ________________________________________________________________________       Assessment:   · Cholecystitis: HIDA abnormal     Patient Active Problem List   Diagnosis Code    Acute systolic (congestive) heart failure (HCC) I50.21    Bilateral lower extremity edema R60.0    Shortness of breath R06.02    Acute renal failure (ARF) (HCC) N17.9    Hyperglycemia due to type 2 diabetes mellitus (HCC) E11.65    Venous stasis dermatitis of both lower extremities I83.11, I83.12    Hypoxia R09.02    Pulmonary edema J81.1    Sinus tachycardia R00.0    Ischemic cardiomyopathy Y34.2    Systolic heart failure (HCC) I50.20     Plan:   · Appreciate surgical recs  · Likely IR placed tube     Signed By: Tyron Velazquez. Nasir Lopez NP     2/7/2017  11:42 AM           GI Attending: Will await surgical team's input regarding next step. HIDA scan abnormal. Alk blayne minimally elevated. Other LFT's normal. Will follow. Evangelist Valencia, MD

## 2017-02-07 NOTE — PALLIATIVE CARE
Palliative Medicine Social Work      Reviewed chart and spoke to team on unit about his progress. Nursing noted code status order incorrect. Patient should be listed as Partial code, as willing to be intubated in context of respiratory failure. Also willing to receive pressors. Patient's surgery is delayed secondary to abdominal pain suspicious for cholecystitis. He is on antibiotics now and hopeful for resolve soon. Will continue to support as he awaits surgery. Thank you for the opportunity to be involved in the care of Spud. Blaire Reese, SAM, [facility]-  Palliative Medicine   [employer] ® ACP Facilitator   [phone]

## 2017-02-07 NOTE — PROGRESS NOTES
Problem: Diabetes Self-Management  Goal: *Monitoring blood glucose, interpreting and using results  Identify recommended blood glucose targets and personal targets. Outcome: Progressing Towards Goal  Discussed pt's goal BS control and how often to check at home        Problem: Falls - Risk of  Goal: *Absence of falls  Outcome: Progressing Towards Goal  Pt is up ad amelie, walks with steady gait and no assistance. Grippy socks on, call bell within reach     Problem: Pressure Ulcer - Risk of  Goal: *Prevention of pressure ulcer  Outcome: Progressing Towards Goal  Pt able to turn and ambulate self independently. Pt applying lotion to dry areas and blood glucose monitoring is under control. Problem: Infection - Risk of, Central Venous Catheter-Associated Bloodstream Infection  Goal: *Absence of infection signs and symptoms  Outcome: Progressing Towards Goal  PICC line dressing c/d/i. Pt encouraged to use hand  after using bathroom.

## 2017-02-07 NOTE — PROGRESS NOTES
[facility] Progress Note      NAME:  Fernanda Cain. :   1950   MRN:   [id number]   PCP:  None  CARD:   Dr. Sara Song    Date:  2017     Fernanda Cain. is a 61 y.o. male with a who presented for further evaluation of severe CAD and systolic heart failure. Subjective:   He was initially seen at [address] [place] [address] 3 years ago and told that he had heart failure with LVEF 30%. He was lost to follow up due to lack of insurance and has not been seen by a physician in the interim. He complains of progressive fatigue, NAVARRO, PND, and edema over the past year, prompting presentation to [facility]. He also complained of lower extremity bullae, weeping, and pain. He denied palpitations, presyncope, syncope, or chest pain. Past 24 hours:  Abdominal pain has resolved  Denies fever, nausea, chills  Edema continues to improve  Anxious about upcoming left main stent    Objective:     Visit Vitals    /69 (BP 1 Location: Right arm, BP Patient Position: At rest;Sitting)    Pulse 91    Temp 97.6 °F (36.4 °C)    Resp 18    Ht 5' 9\" (1.753 m)    Wt 205 lb 0.4 oz (93 kg)    SpO2 96%    BMI 30.28 kg/m2          General:  fatigued    HEENT: Normocephalic, EOMI, PERRLA, Hearing intact, trachea mid-line    Neck:  supple, no significant adenopathy, carotids upstroke normal bilaterally, no bruits    CVP:  8  cm  ( + ) HJR    Heart:  Diminished PMI    Normal S1 and S2, S3 gallop    Murmur: 2/6 systolic murmur    Lungs: Diminished breath sounds left lower lung    Abdomen: soft, non-tender. Bowel sounds normal. +HSM    Extremity: Mild erythema bilateral lower extremities with 1+ pitting edema bilaterally    Neuro: Alert and oriented to person, place, and time; normal strength and tone. Normal symmetric reflexes. Normal coordination and gait       1901 -  0700  In: .7 [P.O.:720;  I.V.:1308.7]  Out: 5375 [Urine:5375]  O2 Flow Rate (L/min): 2 l/min O2 Device: Room air  Temp (24hrs), Av.1 °F (36.7 °C), Min:97.5 °F (36.4 °C), Max:98.5 °F (36.9 °C)          Care Plan discussed with:    Comments   Patient x    Family      RN x    Care Manager                    Consultant:          Past History:     Past Medical History   Diagnosis Date    Cardiomyopathy ([address] 2017     A. Echo (17):  EF 5-10% with severe GHK,. Mildly dil LA. Mild TR. PASP 46. No past surgical history on file. Social History   Substance Use Topics    Smoking status: Not on file    Smokeless tobacco: Not on file    Alcohol use Not on file        No family history on file. Allergies: Allergies   Allergen Reactions    Heparin (Porcine) Unknown (comments)          Data Review:     CXR:   CXR Results  (Last 48 hours)    None            Echocardiogram:   Echo Results  (Last 48 hours)    None          No results found for this visit on 17. ECG:  EKG: ST with occasional PVC, NSIVCD, LAE    LABS:  Recent Results (from the past 24 hour(s))   GLUCOSE, POC    Collection Time: 17  4:57 PM   Result Value Ref Range    Glucose (POC) 203 (H) 65 - 100 mg/dL    Performed by Logan Juarez    GLUCOSE, POC    Collection Time: 17  9:19 PM   Result Value Ref Range    Glucose (POC) 215 (H) 65 - 100 mg/dL    Performed by [address] [place] [address]    Collection Time: 17  2:49 AM   Result Value Ref Range    Sodium 134 (L) 136 - 145 mmol/L    Potassium 3.7 3.5 - 5.1 mmol/L    Chloride 98 97 - 108 mmol/L    CO2 30 21 - 32 mmol/L    Anion gap 6 5 - 15 mmol/L    Glucose 96 65 - 100 mg/dL    BUN 45 (H) 6 - 20 MG/DL    Creatinine 2.06 (H) 0.70 - 1.30 MG/DL    BUN/Creatinine ratio 22 (H) 12 - 20      GFR est AA 40 (L) >60 ml/min/1.73m2    GFR est non-AA 33 (L) >60 ml/min/1.73m2    Calcium 8.8 8.5 - 10.1 MG/DL    Bilirubin, total 0.4 0.2 - 1.0 MG/DL    ALT (SGPT) 19 12 - 78 U/L    AST (SGOT) 23 15 - 37 U/L    Alk.  phosphatase 161 (H) 45 - 117 U/L    Protein, total 6.7 6.4 - 8.2 g/dL    Albumin 2.7 (L) 3.5 - 5.0 g/dL    Globulin 4.0 2.0 - 4.0 g/dL    A-G Ratio 0.7 (L) 1.1 - 2.2     MAGNESIUM    Collection Time: 02/07/17  2:49 AM   Result Value Ref Range    Magnesium 2.0 1.6 - 2.4 mg/dL   CBC W/O DIFF    Collection Time: 02/07/17  2:49 AM   Result Value Ref Range    WBC 5.2 4.1 - 11.1 K/uL    RBC 3.92 (L) 4.10 - 5.70 M/uL    HGB 9.4 (L) 12.1 - 17.0 g/dL    HCT 30.6 (L) 36.6 - 50.3 %    MCV 78.1 (L) 80.0 - 99.0 FL    MCH 24.0 (L) 26.0 - 34.0 PG    MCHC 30.7 30.0 - 36.5 g/dL    RDW 16.4 (H) 11.5 - 14.5 %    PLATELET 112 609 - 019 K/uL   PTT    Collection Time: 02/07/17  2:49 AM   Result Value Ref Range    aPTT 28.3 22.1 - 32.5 sec    aPTT, therapeutic range     58.0 - 77.0 SECS   GLUCOSE, POC    Collection Time: 02/07/17  7:19 AM   Result Value Ref Range    Glucose (POC) 80 65 - 100 mg/dL    Performed by Celsa Xie      All Micro Results     Procedure Component Value Units Date/Time    CULTURE, BLOOD, PAIRED [[id number]] Collected:  02/05/17 0322    Order Status:  Completed Specimen:  Blood Updated:  02/07/17 0618     Special Requests: NO SPECIAL REQUESTS        Culture result: NO GROWTH 2 DAYS       CULTURE, BODY FLUID Joe Lama STAIN [[id number]] Collected:  01/26/17 1505    Order Status:  Completed Specimen:  Thoracentesis Updated:  01/30/17 1057     Special Requests: NO SPECIAL REQUESTS        GRAM STAIN OCCASIONAL  WBCS SEEN         NO ORGANISMS SEEN        Culture result: NO GROWTH 4 DAYS       CULTURE, Star Dami STAIN [[id number]] Collected:  01/25/17 5821    Order Status:  Completed Specimen:  Leg Updated:  01/27/17 1440     Special Requests: NO SPECIAL REQUESTS        GRAM STAIN RARE  WBCS SEEN         NO ORGANISMS SEEN        Culture result: LIGHT  STREPTOCOCCI, BETA HEMOLYTIC GROUP C  . .. Penicillin and ampicillin are drugs of choice for treatment of beta-hemolytic streptococcal infections.  Susceptibility testing of penicillins and beta-lactams approved by the FDA for treatment of beta-hemolytic streptococcal infections need not be performed routinely, because nonsusceptible isolates are extremely rare. CLSI 2012         MODERATE  BETTIE TROPICALIS           Abdominal Ultrasound 2/4/17  IMPRESSION: Distended gallbladder with gallbladder wall thickening,  pericholecystic fluid and tenderness to probe palpation during exam. Findings  are consistent with acalculus cholecystitis. Thoracentesis 1/26/17  Specimen Source   1: Pleural Fluid   CYTOLOGIC INTERPRETATION:   Scattered mesothelial cells with degenerative changes and mixed inflammatory cells   General Categorization   No cells diagnostic for malignancy   Specimen Adequacy   Satisfactory for evaluation       Cardiac MRI 1/31/17  IMPRESSION  1. Markedly dilated left ventricle with 3-D end-diastolic volume index to body  surface area of 153 mL/sq m. Mild eccentric left ventricular hypertrophy with  3-D mass index to body surface area of 85 g/sq m. Severe left ventricular  systolic dysfunction. Severe global hypokinesis with regional variation. 3-D  LVEF 10%. 2. Moderately dilated right ventricle by 3-D volumetric assessment. RV end  diastolic volume indexed to body surface area of 138 mL/sq m. Moderate to severe  right ventricular systolic dysfunction. Global hypokinesis. 3-D RVEF 25%. 3. Apical a tethered mitral valve leaflets. Mild mitral regurgitation. 4. Moderately severe 3+ tricuspid regurgitation. 5. On LGE study, the anterior wall, anteroseptal wall, anteroapical wall, and  anterior lateral wall are largely viable without significant myocardial  infarction. The entire inferior wall and inferoseptal wall are completely viable  without any infarct. The base to mid inferolateral wall demonstrate a near  transmural greater than 75% thickness infarct with minimal or no viable  myocardium in this territory. The distal inferolateral wall, apical lateral wall  does not demonstrate any infarct and are largely viable.  Based on the viability  imaging, the entire LAD territory is largely viable and should recover upon  revascularization. The entire RCA territory is largely viable and should recover  upon revascularization. The LCx territory demonstrate medium-size infarct with  limited viability. 6. Large left-sided pleural effusion with passive atelectasis of the left lung. 7. Dilated branch pulmonary arteries suggest pulmonary hypertension. 8. Markedly dilated left atrium measuring 59 x 55 mm. Moderately dilated right  atrium measuring 46 x 56 mm.     Medications reviewed:    Current Facility-Administered Medications   Medication Dose Route Frequency    bumetanide (BUMEX) tablet 2 mg  2 mg Oral BID    docusate sodium (COLACE) capsule 100 mg  100 mg Oral DAILY PRN    piperacillin-tazobactam (ZOSYN) 3.375 g in 0.9% sodium chloride (MBP/ADV) 100 mL  3.375 g IntraVENous Q8H    nicotine (NICODERM CQ) 21 mg/24 hr patch 1 Patch  1 Patch TransDERmal DAILY    glimepiride (AMARYL) tablet 4 mg  4 mg Oral ACB    insulin glargine (LANTUS) injection 10 Units  10 Units SubCUTAneous DAILY    gabapentin (NEURONTIN) capsule 100 mg  100 mg Oral BID    milrinone (PRIMACOR) 20 MG/100 ML D5W infusion  0.3 mcg/kg/min IntraVENous CONTINUOUS    carvedilol (COREG) tablet 3.125 mg  3.125 mg Oral BID WITH MEALS    amiodarone (CORDARONE) tablet 200 mg  200 mg Oral Q12H    bacitracin 500 unit/gram packet 1 Packet  1 Packet Topical PRN    atorvastatin (LIPITOR) tablet 10 mg  10 mg Oral DAILY    0.9% sodium chloride infusion  10 mL/hr IntraVENous CONTINUOUS    acetaminophen (TYLENOL) tablet 650 mg  650 mg Oral Q6H PRN    aspirin delayed-release tablet 81 mg  81 mg Oral DAILY    glucagon (GLUCAGEN) injection 1 mg  1 mg IntraMUSCular PRN    glucose chewable tablet 16 g  4 Tab Oral PRN    insulin lispro (HUMALOG) injection   SubCUTAneous AC&HS    magnesium oxide (MAG-OX) tablet 400 mg  400 mg Oral DAILY    0.9% sodium chloride infusion  3 mL/hr IntraVENous CONTINUOUS    sodium chloride (NS) flush 5-10 mL  5-10 mL IntraVENous Q8H    sodium chloride (NS) flush 5-10 mL  5-10 mL IntraVENous PRN    albuterol (PROVENTIL VENTOLIN) nebulizer solution 2.5 mg  2.5 mg Nebulization Q4H PRN    diphenhydrAMINE (BENADRYL) capsule 25 mg  25 mg Oral QHS PRN    oxyCODONE-acetaminophen (PERCOCET) 5-325 mg per tablet 2 Tab  2 Tab Oral Q4H PRN    ELECTROLYTE REPLACEMENT PROTOCOL  1 Each Other PRN     HIDA Scan 2/7/17  Gallbladder emptying study was performed per protocol utilizing 5. 2mCi Tc-99 M  Choletec and 1.86 mcg CCK intravenously. There is normal tracer distribution  throughout the liver. Activity is noted in the gallbladder, common bile duct,  and small bowel. The gallbladder ejection fraction is 31% (normal greater than  or equal to 35%).    IMPRESSION  IMPRESSION: Abnormal gallbladder emptying study with an EF of only 31%.        IMPRESSION:   1. Acute on chronic systolic heart failure (ICM) - Stage D, NYHA Class IV  2. Severe native coronary artery disease  3. Diabetes Mellitus, Hga1c 13.5  4. History of tobacco abuse  5. Cellulitis  6. CHACORTA on CKD3  7. Pulmonary HTN  8. Persistent atrial fibrillation  9. Hyponatremia  10. Left pleural effusion  11. Acalculous Cholecystitis       PLAN:   1. Continue  IV milrinone 0.3 mcg/kg/min, Follow I/O, Replete electrolytes, Hold ACE-I due to CHACORTA on CKD. Continue low dose coreg and reduce to po bumex 2 mg bid  2. Continue ASA, statin, low dose BB; too high risk for CABG d/t low LVEF and diabetes out of control  3. Viable LAD and RCA territory and limited LCx viability - discussed high risk Impella supported Left Main PCI with Mary López and Shade Alford, tenatively scheduled for Thursday 2/9/17  4. Discussed risks/benefits including death for left main stent as well as the risk of death with medical therapy alone - encouraged patient to discuss his ACP with his wife and family  11.  Lantus and sliding scale insulin, accuchecks, diabetic education  6. IV cefazolin 1 gram q 8 hours for cellulitis and IV zosyn 3.375 g q 8 hours for choleycystitis  7.   8. Smoking cessation counseling, nicotine patch         Thank you for letting me see him with you,    Dorie Galvan MD, [place] [address] Director  [facility]  [address]  Office: [phone]  Fax: [phone]  24 hour VAD/HF Pager: [phone]

## 2017-02-07 NOTE — PROGRESS NOTES
Infectious Diseases Progress Note    Antibiotic Summary:  Levaquin  --   Vancomycin  --   Ancef    -- 2/3  Keflex   2/3 --   Zosyn   -- present      Subjective:     No new symptoms    Objective:     Vitals:   Visit Vitals    /65 (BP 1 Location: Right arm, BP Patient Position: At rest)    Pulse 85    Temp 97.9 °F (36.6 °C)    Resp 18    Ht 5' 9\" (1.753 m)    Wt 85 kg (187 lb 6.3 oz)    SpO2 96%    BMI 27.67 kg/m2        Tmax:  Temp (24hrs), Av °F (36.7 °C), Min:97.2 °F (36.2 °C), Max:98.6 °F (37 °C)      Exam:  General appearance: alert, no distress  Lungs: few rales at bases  Heart: RRR  Abdomen: tender in the RUQ and epigastrium  Left leg: much improved  Right leg: much improved    IV Lines: Left PICC inserted     Labs:    Recent Labs      17   0326  17   0322  17   0436   WBC  5.9  5.6  5.3   HGB  9.9*  10.2*  10.1*   PLT  163  153  124*   BUN  45*  50*  49*   CREA  1.89*  1.86*  1.82*   TBILI  0.5  0.5  0.5   SGOT  30  24  25   AP  180*  175*  171*     Culture right leg ulcers  = group C Streptococcus    US abdomen 2/3 = \"Distended gallbladder with gallbladder wall thickening,  pericholecystic fluid and tenderness to probe palpation during exam. Findings  are consistent with acalculus cholecystitis\"    Assessment:     1. Bilateral venous stasis disease of the LEs +/- cellulitis: Much better     2. RUQ tenderness and abnormal GB US: Clinically, he says the symptoms are unchanged for months or even years. Remains on Zosyn     3. OHD -- IHD/CAD; CHF; pulm HTN     4. CRF with acute exacerbation     5. NIDDM -- new diagnosis     6. Probable COPD -- heavy smoking since age 7      9. Anemia -- HC/MC -- positive family history of colon cancer -- may need GI W/U    Plan:     1.  Reinaldo Munoz MD

## 2017-02-07 NOTE — PROGRESS NOTES
Physical Therapy  2/7/17    Per PT/PTA, pt is at a supervision level for ambulation. Continue to recommend ambulation with nursing staff 3x/day. Will discontinue orders for now. Please re-consult (new orders) for PT re-evaluation following Thursday's procedure (High risk PCI with Impella Support). Thank you.   Mary Ahn, PT, DPT

## 2017-02-07 NOTE — PROGRESS NOTES
[facility]   [address] [facility], [address] [place] [address], [facility]  Phone: [phone]   [phone]       Nephrology Progress Note  José Miguel De La O.     1950     [id number]  Date of Admission : 1/20/2017 02/07/17    CC: Follow up for CKD/ARF      Assessment and Plan   CHACORTA on CKD :  - Cr up a little, watch for now  - continue Bumex 2 mg TID    CKD :  Baseline Cr unknown   Presumed to be 2/2 untreated DM, HTN and CMP at this time     Thrombocytopenia :  -  CECILIA Ab +ve. REJI +VE --> avoid Heparin in future     Hyponatremia :  - combination of  CHF + SIADH from chronic alcoholism  - Na at 134 today  - No further tolvaptan for now  - daily Na levels for now    Acute on Chronic Systolic CHF w/ severe CMP  - echo with EF 5-10%, severely dilated LV, severe TR  - Multivessel CAD : MRI showed viable myocardium   - on Milrinone  - High PCI planned from 2/7    Cellulitis RLE w/ Pustular lesions : resolved    Pulm HTN     Chronic smoker w/VENANCIO -PNA    Acalculous Cholecystitis : per surgery       Interval History:  Noted plans for HIDA   PCI postponed to Thursday   Good diuresis and has not been weighed yet   C/o Knot on left side of neck and neck pain       Review of Systems: Pertinent items are noted in HPI.     Current Medications:   Current Facility-Administered Medications   Medication Dose Route Frequency    docusate sodium (COLACE) capsule 100 mg  100 mg Oral DAILY PRN    piperacillin-tazobactam (ZOSYN) 3.375 g in 0.9% sodium chloride (MBP/ADV) 100 mL  3.375 g IntraVENous Q8H    bumetanide (BUMEX) tablet 2 mg  2 mg Oral TID    nicotine (NICODERM CQ) 21 mg/24 hr patch 1 Patch  1 Patch TransDERmal DAILY    glimepiride (AMARYL) tablet 4 mg  4 mg Oral ACB    insulin glargine (LANTUS) injection 10 Units  10 Units SubCUTAneous DAILY    gabapentin (NEURONTIN) capsule 100 mg  100 mg Oral BID    milrinone (PRIMACOR) 20 MG/100 ML D5W infusion  0.3 mcg/kg/min IntraVENous CONTINUOUS    carvedilol (COREG) tablet 3.125 mg  3.125 mg Oral BID WITH MEALS    amiodarone (CORDARONE) tablet 200 mg  200 mg Oral Q12H    bacitracin 500 unit/gram packet 1 Packet  1 Packet Topical PRN    atorvastatin (LIPITOR) tablet 10 mg  10 mg Oral DAILY    0.9% sodium chloride infusion  10 mL/hr IntraVENous CONTINUOUS    acetaminophen (TYLENOL) tablet 650 mg  650 mg Oral Q6H PRN    aspirin delayed-release tablet 81 mg  81 mg Oral DAILY    glucagon (GLUCAGEN) injection 1 mg  1 mg IntraMUSCular PRN    glucose chewable tablet 16 g  4 Tab Oral PRN    insulin lispro (HUMALOG) injection   SubCUTAneous AC&HS    magnesium oxide (MAG-OX) tablet 400 mg  400 mg Oral DAILY    0.9% sodium chloride infusion  3 mL/hr IntraVENous CONTINUOUS    sodium chloride (NS) flush 5-10 mL  5-10 mL IntraVENous Q8H    sodium chloride (NS) flush 5-10 mL  5-10 mL IntraVENous PRN    albuterol (PROVENTIL VENTOLIN) nebulizer solution 2.5 mg  2.5 mg Nebulization Q4H PRN    diphenhydrAMINE (BENADRYL) capsule 25 mg  25 mg Oral QHS PRN    oxyCODONE-acetaminophen (PERCOCET) 5-325 mg per tablet 2 Tab  2 Tab Oral Q4H PRN    ELECTROLYTE REPLACEMENT PROTOCOL  1 Each Other PRN      No Known Allergies    Objective:  Vitals:    Vitals:    02/06/17 1943 02/07/17 0028 02/07/17 0240 02/07/17 0609   BP: 113/65 113/62 106/60    Pulse: 85 83 81    Resp: 18 18 18    Temp: 97.9 °F (36.6 °C) 97.5 °F (36.4 °C) 98.5 °F (36.9 °C)    SpO2: 96% 95% 97%    Weight:    93 kg (205 lb 0.4 oz)   Height:         Intake and Output:     02/05 1901 - 02/07 0700  In: 2028.7 [P.O.:720;  I.V.:1308.7]  Out: 5375 [Urine:5375]    Physical Examination:  General: Appears stated age  Resp:  Lungs mostly clear  CV:  RRR,  no murmur or rub,+ LE edema  GI:  Soft, distended, NT, + Bowel sounds, no hepatosplenomegaly  Neurologic:  Non focal  Skin:  RLE cellulitis - resolving   :  No mendoza    []    High complexity decision making was performed  []    Patient is at high-risk of decompensation with multiple organ involvement    Lab Data Personally Reviewed: I have reviewed all the pertinent labs, microbiology data and radiology studies during assessment. Recent Labs      02/07/17   0249 02/06/17 0326  02/05/17 0322   NA  134*  133*  136   K  3.7  3.9  4.2   CL  98  97  97   CO2  30  27  29   GLU  96  173*  130*   BUN  45*  45*  50*   CREA  2.06*  1.89*  1.86*   CA  8.8  8.8  9.0   MG  2.0  2.1  2.1   ALB  2.7*  2.7*  2.8*   SGOT  23  30  24   ALT  19  19  17     Recent Labs      02/07/17 0249 02/06/17 0326 02/05/17 0322   WBC  5.2  5.9  5.6   HGB  9.4*  9.9*  10.2*   HCT  30.6*  31.9*  32.7*   PLT  164  163  153     No results found for: SDES  Lab Results   Component Value Date/Time    Culture result: NO GROWTH 2 DAYS 02/05/2017 03:22 AM    Culture result: NO GROWTH 4 DAYS 01/26/2017 03:05 PM    Culture result:  01/25/2017 09:34 PM     LIGHT  STREPTOCOCCI, BETA HEMOLYTIC GROUP C  . .. Penicillin and ampicillin are drugs of choice for treatment of beta-hemolytic streptococcal infections. Susceptibility testing of penicillins and beta-lactams approved by the FDA for treatment of beta-hemolytic streptococcal infections need not be performed routinely, because nonsusceptible isolates are extremely rare.  CLSI 2012      Culture result: MODERATE  CANDIDA TROPICALIS   01/25/2017 09:34 PM    Culture result: NO SIGNIFICANT GROWTH 01/19/2017 01:35 AM     Recent Results (from the past 24 hour(s))   GLUCOSE, POC    Collection Time: 02/06/17 11:41 AM   Result Value Ref Range    Glucose (POC) 148 (H) 65 - 100 mg/dL    Performed by Jaguar Bailon, POC    Collection Time: 02/06/17  4:57 PM   Result Value Ref Range    Glucose (POC) 203 (H) 65 - 100 mg/dL    Performed by [address] [place] [address], POC    Collection Time: 02/06/17  9:19 PM   Result Value Ref Range    Glucose (POC) 215 (H) 65 - 100 mg/dL    Performed by [address] [place] [address], COMPREHENSIVE    Collection Time: 02/07/17 2:49 AM   Result Value Ref Range    Sodium 134 (L) 136 - 145 mmol/L    Potassium 3.7 3.5 - 5.1 mmol/L    Chloride 98 97 - 108 mmol/L    CO2 30 21 - 32 mmol/L    Anion gap 6 5 - 15 mmol/L    Glucose 96 65 - 100 mg/dL    BUN 45 (H) 6 - 20 MG/DL    Creatinine 2.06 (H) 0.70 - 1.30 MG/DL    BUN/Creatinine ratio 22 (H) 12 - 20      GFR est AA 40 (L) >60 ml/min/1.73m2    GFR est non-AA 33 (L) >60 ml/min/1.73m2    Calcium 8.8 8.5 - 10.1 MG/DL    Bilirubin, total 0.4 0.2 - 1.0 MG/DL    ALT (SGPT) 19 12 - 78 U/L    AST (SGOT) 23 15 - 37 U/L    Alk. phosphatase 161 (H) 45 - 117 U/L    Protein, total 6.7 6.4 - 8.2 g/dL    Albumin 2.7 (L) 3.5 - 5.0 g/dL    Globulin 4.0 2.0 - 4.0 g/dL    A-G Ratio 0.7 (L) 1.1 - 2.2     MAGNESIUM    Collection Time: 02/07/17  2:49 AM   Result Value Ref Range    Magnesium 2.0 1.6 - 2.4 mg/dL   CBC W/O DIFF    Collection Time: 02/07/17  2:49 AM   Result Value Ref Range    WBC 5.2 4.1 - 11.1 K/uL    RBC 3.92 (L) 4.10 - 5.70 M/uL    HGB 9.4 (L) 12.1 - 17.0 g/dL    HCT 30.6 (L) 36.6 - 50.3 %    MCV 78.1 (L) 80.0 - 99.0 FL    MCH 24.0 (L) 26.0 - 34.0 PG    MCHC 30.7 30.0 - 36.5 g/dL    RDW 16.4 (H) 11.5 - 14.5 %    PLATELET 329 649 - 776 K/uL   PTT    Collection Time: 02/07/17  2:49 AM   Result Value Ref Range    aPTT 28.3 22.1 - 32.5 sec    aPTT, therapeutic range     58.0 - 77.0 SECS   GLUCOSE, POC    Collection Time: 02/07/17  7:19 AM   Result Value Ref Range    Glucose (POC) 80 65 - 100 mg/dL    Performed by Dennie Sieving            I have reviewed the flowsheets. Chart and Pertinent Notes have been reviewed. No change in PMH ,family and social history from Consult note.       Diego Valentino MD

## 2017-02-07 NOTE — PROGRESS NOTES
1930: Bedside shift change report given to [address] (oncoming nurse) by Neda Perez RN (offgoing nurse). Report included the following information SBAR, Procedure Summary, Intake/Output, MAR, Recent Results and Cardiac Rhythm NSR.    0028: Pt complained of unrelenting pain. Informed pt that he couldn't get percocet until 0115. When offered to get him something he informed RN that he couldn't have anything. Pt was very adamant that he was having an\"MRI\" and he couldn't eat or drink anything. Will continue to monitor. 0700: Uneventful shift. Pt accepted percocet at [place]. Nuclear med called and said no medications this morning before the scan, they would be up to get him at 1030am.     0730: Bedside shift change report given to Mayela Forbes 90 (oncoming nurse) by [address] (offgoing nurse). Report included the following information SBAR, Intake/Output, MAR, Recent Results and Cardiac Rhythm NSR.

## 2017-02-08 LAB
ALBUMIN SERPL BCP-MCNC: 2.8 G/DL (ref 3.5–5)
ALBUMIN/GLOB SERPL: 0.6 {RATIO} (ref 1.1–2.2)
ALP SERPL-CCNC: 158 U/L (ref 45–117)
ALT SERPL-CCNC: 21 U/L (ref 12–78)
ANION GAP BLD CALC-SCNC: 9 MMOL/L (ref 5–15)
APTT PPP: 27.7 SEC (ref 22.1–32.5)
AST SERPL W P-5'-P-CCNC: 20 U/L (ref 15–37)
BILIRUB SERPL-MCNC: 0.4 MG/DL (ref 0.2–1)
BUN SERPL-MCNC: 42 MG/DL (ref 6–20)
BUN/CREAT SERPL: 23 (ref 12–20)
CALCIUM SERPL-MCNC: 8.9 MG/DL (ref 8.5–10.1)
CHLORIDE SERPL-SCNC: 97 MMOL/L (ref 97–108)
CO2 SERPL-SCNC: 29 MMOL/L (ref 21–32)
CREAT SERPL-MCNC: 1.83 MG/DL (ref 0.7–1.3)
ERYTHROCYTE [DISTWIDTH] IN BLOOD BY AUTOMATED COUNT: 16.4 % (ref 11.5–14.5)
GLOBULIN SER CALC-MCNC: 4.4 G/DL (ref 2–4)
GLUCOSE BLD STRIP.AUTO-MCNC: 128 MG/DL (ref 65–100)
GLUCOSE BLD STRIP.AUTO-MCNC: 156 MG/DL (ref 65–100)
GLUCOSE BLD STRIP.AUTO-MCNC: 216 MG/DL (ref 65–100)
GLUCOSE BLD STRIP.AUTO-MCNC: 219 MG/DL (ref 65–100)
GLUCOSE SERPL-MCNC: 181 MG/DL (ref 65–100)
HCT VFR BLD AUTO: 34.3 % (ref 36.6–50.3)
HGB BLD-MCNC: 10.3 G/DL (ref 12.1–17)
MAGNESIUM SERPL-MCNC: 2.3 MG/DL (ref 1.6–2.4)
MCH RBC QN AUTO: 23.7 PG (ref 26–34)
MCHC RBC AUTO-ENTMCNC: 30 G/DL (ref 30–36.5)
MCV RBC AUTO: 78.9 FL (ref 80–99)
PLATELET # BLD AUTO: 170 K/UL (ref 150–400)
POTASSIUM SERPL-SCNC: 3.9 MMOL/L (ref 3.5–5.1)
PROT SERPL-MCNC: 7.2 G/DL (ref 6.4–8.2)
RBC # BLD AUTO: 4.35 M/UL (ref 4.1–5.7)
SERVICE CMNT-IMP: ABNORMAL
SODIUM SERPL-SCNC: 135 MMOL/L (ref 136–145)
THERAPEUTIC RANGE,PTTT: NORMAL SECS (ref 58–77)
WBC # BLD AUTO: 5.4 K/UL (ref 4.1–11.1)

## 2017-02-08 PROCEDURE — 74011250636 HC RX REV CODE- 250/636: Performed by: INTERNAL MEDICINE

## 2017-02-08 PROCEDURE — 74011636637 HC RX REV CODE- 636/637: Performed by: PHYSICIAN ASSISTANT

## 2017-02-08 PROCEDURE — 82962 GLUCOSE BLOOD TEST: CPT

## 2017-02-08 PROCEDURE — 80053 COMPREHEN METABOLIC PANEL: CPT | Performed by: PHYSICIAN ASSISTANT

## 2017-02-08 PROCEDURE — 36415 COLL VENOUS BLD VENIPUNCTURE: CPT | Performed by: PHYSICIAN ASSISTANT

## 2017-02-08 PROCEDURE — 74011250636 HC RX REV CODE- 250/636: Performed by: PHYSICIAN ASSISTANT

## 2017-02-08 PROCEDURE — 85730 THROMBOPLASTIN TIME PARTIAL: CPT | Performed by: PHYSICIAN ASSISTANT

## 2017-02-08 PROCEDURE — 74011000258 HC RX REV CODE- 258: Performed by: PHYSICIAN ASSISTANT

## 2017-02-08 PROCEDURE — 65660000000 HC RM CCU STEPDOWN

## 2017-02-08 PROCEDURE — 74011250637 HC RX REV CODE- 250/637: Performed by: NURSE PRACTITIONER

## 2017-02-08 PROCEDURE — 85027 COMPLETE CBC AUTOMATED: CPT | Performed by: PHYSICIAN ASSISTANT

## 2017-02-08 PROCEDURE — 74011250637 HC RX REV CODE- 250/637: Performed by: PHYSICIAN ASSISTANT

## 2017-02-08 PROCEDURE — 77010033678 HC OXYGEN DAILY

## 2017-02-08 PROCEDURE — 74011636637 HC RX REV CODE- 636/637: Performed by: NURSE PRACTITIONER

## 2017-02-08 PROCEDURE — 83735 ASSAY OF MAGNESIUM: CPT | Performed by: PHYSICIAN ASSISTANT

## 2017-02-08 PROCEDURE — 74011250637 HC RX REV CODE- 250/637: Performed by: INTERNAL MEDICINE

## 2017-02-08 RX ORDER — BUMETANIDE 1 MG/1
2 TABLET ORAL 3 TIMES DAILY
Status: DISCONTINUED | OUTPATIENT
Start: 2017-02-08 | End: 2017-02-13

## 2017-02-08 RX ADMIN — BUMETANIDE 2 MG: 1 TABLET ORAL at 21:32

## 2017-02-08 RX ADMIN — CARVEDILOL 3.12 MG: 3.12 TABLET, FILM COATED ORAL at 16:39

## 2017-02-08 RX ADMIN — PIPERACILLIN AND TAZOBACTAM 3.38 G: 3; .375 INJECTION, POWDER, FOR SOLUTION INTRAVENOUS at 14:25

## 2017-02-08 RX ADMIN — INSULIN LISPRO 2 UNITS: 100 INJECTION, SOLUTION INTRAVENOUS; SUBCUTANEOUS at 11:37

## 2017-02-08 RX ADMIN — PIPERACILLIN AND TAZOBACTAM 3.38 G: 3; .375 INJECTION, POWDER, FOR SOLUTION INTRAVENOUS at 21:32

## 2017-02-08 RX ADMIN — BUMETANIDE 2 MG: 1 TABLET ORAL at 16:39

## 2017-02-08 RX ADMIN — ATORVASTATIN CALCIUM 10 MG: 10 TABLET, FILM COATED ORAL at 08:15

## 2017-02-08 RX ADMIN — MILRINONE LACTATE 0.3 MCG/KG/MIN: 200 INJECTION, SOLUTION INTRAVENOUS at 06:05

## 2017-02-08 RX ADMIN — INSULIN LISPRO 2 UNITS: 100 INJECTION, SOLUTION INTRAVENOUS; SUBCUTANEOUS at 07:18

## 2017-02-08 RX ADMIN — Medication 81 MG: at 08:15

## 2017-02-08 RX ADMIN — Medication 10 ML: at 14:25

## 2017-02-08 RX ADMIN — INSULIN GLARGINE 10 UNITS: 100 INJECTION, SOLUTION SUBCUTANEOUS at 08:15

## 2017-02-08 RX ADMIN — Medication 10 ML: at 21:32

## 2017-02-08 RX ADMIN — GABAPENTIN 100 MG: 100 CAPSULE ORAL at 08:15

## 2017-02-08 RX ADMIN — AMIODARONE HYDROCHLORIDE 200 MG: 200 TABLET ORAL at 21:32

## 2017-02-08 RX ADMIN — GABAPENTIN 100 MG: 100 CAPSULE ORAL at 18:08

## 2017-02-08 RX ADMIN — OXYCODONE HYDROCHLORIDE AND ACETAMINOPHEN 2 TABLET: 5; 325 TABLET ORAL at 06:14

## 2017-02-08 RX ADMIN — Medication 400 MG: at 08:15

## 2017-02-08 RX ADMIN — GLIMEPIRIDE 4 MG: 2 TABLET ORAL at 07:18

## 2017-02-08 RX ADMIN — OXYCODONE HYDROCHLORIDE AND ACETAMINOPHEN 2 TABLET: 5; 325 TABLET ORAL at 21:32

## 2017-02-08 RX ADMIN — OXYCODONE HYDROCHLORIDE AND ACETAMINOPHEN 2 TABLET: 5; 325 TABLET ORAL at 18:08

## 2017-02-08 RX ADMIN — Medication 10 ML: at 06:14

## 2017-02-08 RX ADMIN — CARVEDILOL 3.12 MG: 3.12 TABLET, FILM COATED ORAL at 08:15

## 2017-02-08 RX ADMIN — MILRINONE LACTATE 0.3 MCG/KG/MIN: 200 INJECTION, SOLUTION INTRAVENOUS at 16:39

## 2017-02-08 RX ADMIN — AMIODARONE HYDROCHLORIDE 200 MG: 200 TABLET ORAL at 08:15

## 2017-02-08 RX ADMIN — OXYCODONE HYDROCHLORIDE AND ACETAMINOPHEN 2 TABLET: 5; 325 TABLET ORAL at 02:30

## 2017-02-08 RX ADMIN — OXYCODONE HYDROCHLORIDE AND ACETAMINOPHEN 2 TABLET: 5; 325 TABLET ORAL at 11:37

## 2017-02-08 RX ADMIN — BUMETANIDE 2 MG: 1 TABLET ORAL at 08:15

## 2017-02-08 RX ADMIN — PIPERACILLIN AND TAZOBACTAM 3.38 G: 3; .375 INJECTION, POWDER, FOR SOLUTION INTRAVENOUS at 06:00

## 2017-02-08 NOTE — PROGRESS NOTES
[facility] Progress Note      NAME:  Fernanda Jena :   1950   MRN:   [id number]   PCP:  None  CARD:   Dr. Zoe Han    Date:  2017     Fernanda Jean is a 61 y.o. male with a who presented for further evaluation of severe CAD and systolic heart failure. Subjective:   He was initially seen at [address] [place] [address] 3 years ago and told that he had heart failure with LVEF 30%. He was lost to follow up due to lack of insurance and has not been seen by a physician in the interim. He complains of progressive fatigue, NAVARRO, PND, and edema over the past year, prompting presentation to [facility]. He also complained of lower extremity bullae, weeping, and pain. He denied palpitations, presyncope, syncope, or chest pain. Past 24 hours:  Increase edema today  Anxious about upcoming left main stent    Objective:     Visit Vitals    /69 (BP 1 Location: Right arm, BP Patient Position: At rest)    Pulse 85    Temp 98.6 °F (37 °C)    Resp 18    Ht 5' 9\" (1.753 m)    Wt 86 kg (189 lb 9.5 oz)    SpO2 96%    BMI 28 kg/m2          General:  fatigued    HEENT: Normocephalic, EOMI, PERRLA, Hearing intact, trachea mid-line    Neck:  supple, no significant adenopathy, carotids upstroke normal bilaterally, no bruits    CVP:  8  cm  ( + ) HJR    Heart:  Diminished PMI    Normal S1 and S2, S3 gallop    Murmur: 2/6 systolic murmur    Lungs: Diminished breath sounds left lower lung    Abdomen: soft, non-tender. Bowel sounds normal. +HSM    Extremity: Mild erythema bilateral lower extremities with 1-2+ pitting edema bilaterally    Neuro: Alert and oriented to person, place, and time; normal strength and tone. Normal symmetric reflexes.  Normal coordination and gait       1901 -  0700  In: 1205.6 [P.O.:1080; I.V.:125.6]  Out: 2530 [Urine:2530]  O2 Flow Rate (L/min): 2 l/min O2 Device: Room air  Temp (24hrs), Av.9 °F (36.6 °C), Min:97.5 °F (36.4 °C), Max:98.6 °F (37 °C)          Care Plan discussed with:    Comments   Patient x    Family      RN x    Care Manager                    Consultant:          Past History:     Past Medical History   Diagnosis Date    Cardiomyopathy ([address] 1/20/2017     A. Echo (1/19/17):  EF 5-10% with severe GHK,. Mildly dil LA. Mild TR. PASP 46. No past surgical history on file. Social History   Substance Use Topics    Smoking status: Not on file    Smokeless tobacco: Not on file    Alcohol use Not on file        No family history on file. Allergies: Allergies   Allergen Reactions    Heparin (Porcine) Unknown (comments)          Data Review:     CXR:   CXR Results  (Last 48 hours)               02/07/17 1620  XR CHEST PA LAT Final result    Impression:  Impression: Some improvement in the left effusion and improved aeration in the   left lower lobe. Narrative:  Exam:  2 view chest       Indication: Follow-up left pleural effusion       Comparison to 2/5/2017. PA and lateral views demonstrate normal heart size. The left effusion has   decreased in size compared to the prior exam and there has been an improvement   in aeration in the left lower lobe in the interval. PICC line is unchanged in   position. Echocardiogram:   Echo Results  (Last 48 hours)    None          No results found for this visit on 01/20/17.       ECG:  EKG: ST with occasional PVC, NSIVCD, LAE    LABS:  Recent Results (from the past 24 hour(s))   GLUCOSE, POC    Collection Time: 02/07/17  6:58 PM   Result Value Ref Range    Glucose (POC) 283 (H) 65 - 100 mg/dL    Performed by Rafa Zee    GLUCOSE, POC    Collection Time: 02/07/17  9:25 PM   Result Value Ref Range    Glucose (POC) 175 (H) 65 - 100 mg/dL    Performed by Selena Phliippe    METABOLIC PANEL, COMPREHENSIVE    Collection Time: 02/08/17  3:53 AM   Result Value Ref Range    Sodium 135 (L) 136 - 145 mmol/L    Potassium 3.9 3.5 - 5.1 mmol/L    Chloride 97 97 - 108 mmol/L    CO2 29 21 - 32 mmol/L    Anion gap 9 5 - 15 mmol/L    Glucose 181 (H) 65 - 100 mg/dL    BUN 42 (H) 6 - 20 MG/DL    Creatinine 1.83 (H) 0.70 - 1.30 MG/DL    BUN/Creatinine ratio 23 (H) 12 - 20      GFR est AA 45 (L) >60 ml/min/1.73m2    GFR est non-AA 37 (L) >60 ml/min/1.73m2    Calcium 8.9 8.5 - 10.1 MG/DL    Bilirubin, total 0.4 0.2 - 1.0 MG/DL    ALT (SGPT) 21 12 - 78 U/L    AST (SGOT) 20 15 - 37 U/L    Alk.  phosphatase 158 (H) 45 - 117 U/L    Protein, total 7.2 6.4 - 8.2 g/dL    Albumin 2.8 (L) 3.5 - 5.0 g/dL    Globulin 4.4 (H) 2.0 - 4.0 g/dL    A-G Ratio 0.6 (L) 1.1 - 2.2     MAGNESIUM    Collection Time: 02/08/17  3:53 AM   Result Value Ref Range    Magnesium 2.3 1.6 - 2.4 mg/dL   CBC W/O DIFF    Collection Time: 02/08/17  3:53 AM   Result Value Ref Range    WBC 5.4 4.1 - 11.1 K/uL    RBC 4.35 4.10 - 5.70 M/uL    HGB 10.3 (L) 12.1 - 17.0 g/dL    HCT 34.3 (L) 36.6 - 50.3 %    MCV 78.9 (L) 80.0 - 99.0 FL    MCH 23.7 (L) 26.0 - 34.0 PG    MCHC 30.0 30.0 - 36.5 g/dL    RDW 16.4 (H) 11.5 - 14.5 %    PLATELET 063 057 - 997 K/uL   PTT    Collection Time: 02/08/17  3:53 AM   Result Value Ref Range    aPTT 27.7 22.1 - 32.5 sec    aPTT, therapeutic range     58.0 - 77.0 SECS   GLUCOSE, POC    Collection Time: 02/08/17  6:53 AM   Result Value Ref Range    Glucose (POC) 219 (H) 65 - 100 mg/dL    Performed by [address] [place] [address] Results     Procedure Component Value Units Date/Time    CULTURE, BLOOD, PAIRED [[id number]] Collected:  02/05/17 0322    Order Status:  Completed Specimen:  Blood Updated:  02/08/17 0502     Special Requests: NO SPECIAL REQUESTS        Culture result: NO GROWTH 3 DAYS       CULTURE, BODY FLUID Gui Gonzalez STAIN [[id number]] Collected:  01/26/17 1505    Order Status:  Completed Specimen:  Thoracentesis Updated:  01/30/17 1057     Special Requests: NO SPECIAL REQUESTS        GRAM STAIN OCCASIONAL  WBCS SEEN         NO ORGANISMS SEEN        Culture result: NO GROWTH 4 DAYS       CULTURE, WOUND Gui Gonzalez STAIN [[id number]] Collected:  01/25/17 2134    Order Status:  Completed Specimen:  Leg Updated:  01/27/17 1440     Special Requests: NO SPECIAL REQUESTS        GRAM STAIN RARE  WBCS SEEN         NO ORGANISMS SEEN        Culture result: LIGHT  STREPTOCOCCI, BETA HEMOLYTIC GROUP C  . .. Penicillin and ampicillin are drugs of choice for treatment of beta-hemolytic streptococcal infections. Susceptibility testing of penicillins and beta-lactams approved by the FDA for treatment of beta-hemolytic streptococcal infections need not be performed routinely, because nonsusceptible isolates are extremely rare. CLSI 2012         MODERATE  BETTIE TROPICALIS           Abdominal Ultrasound 2/4/17  IMPRESSION: Distended gallbladder with gallbladder wall thickening,  pericholecystic fluid and tenderness to probe palpation during exam. Findings  are consistent with acalculus cholecystitis. Thoracentesis 1/26/17  Specimen Source   1: Pleural Fluid   CYTOLOGIC INTERPRETATION:   Scattered mesothelial cells with degenerative changes and mixed inflammatory cells   General Categorization   No cells diagnostic for malignancy   Specimen Adequacy   Satisfactory for evaluation       Cardiac MRI 1/31/17  IMPRESSION  1. Markedly dilated left ventricle with 3-D end-diastolic volume index to body  surface area of 153 mL/sq m. Mild eccentric left ventricular hypertrophy with  3-D mass index to body surface area of 85 g/sq m. Severe left ventricular  systolic dysfunction. Severe global hypokinesis with regional variation. 3-D  LVEF 10%. 2. Moderately dilated right ventricle by 3-D volumetric assessment. RV end  diastolic volume indexed to body surface area of 138 mL/sq m. Moderate to severe  right ventricular systolic dysfunction. Global hypokinesis. 3-D RVEF 25%. 3. Apical a tethered mitral valve leaflets. Mild mitral regurgitation. 4. Moderately severe 3+ tricuspid regurgitation. 5.  On LGE study, the anterior wall, anteroseptal wall, anteroapical wall, and  anterior lateral wall are largely viable without significant myocardial  infarction. The entire inferior wall and inferoseptal wall are completely viable  without any infarct. The base to mid inferolateral wall demonstrate a near  transmural greater than 75% thickness infarct with minimal or no viable  myocardium in this territory. The distal inferolateral wall, apical lateral wall  does not demonstrate any infarct and are largely viable. Based on the viability  imaging, the entire LAD territory is largely viable and should recover upon  revascularization. The entire RCA territory is largely viable and should recover  upon revascularization. The LCx territory demonstrate medium-size infarct with  limited viability. 6. Large left-sided pleural effusion with passive atelectasis of the left lung. 7. Dilated branch pulmonary arteries suggest pulmonary hypertension. 8. Markedly dilated left atrium measuring 59 x 55 mm. Moderately dilated right  atrium measuring 46 x 56 mm.     Medications reviewed:    Current Facility-Administered Medications   Medication Dose Route Frequency    bumetanide (BUMEX) tablet 2 mg  2 mg Oral TID    docusate sodium (COLACE) capsule 100 mg  100 mg Oral DAILY PRN    piperacillin-tazobactam (ZOSYN) 3.375 g in 0.9% sodium chloride (MBP/ADV) 100 mL  3.375 g IntraVENous Q8H    nicotine (NICODERM CQ) 21 mg/24 hr patch 1 Patch  1 Patch TransDERmal DAILY    glimepiride (AMARYL) tablet 4 mg  4 mg Oral ACB    insulin glargine (LANTUS) injection 10 Units  10 Units SubCUTAneous DAILY    gabapentin (NEURONTIN) capsule 100 mg  100 mg Oral BID    milrinone (PRIMACOR) 20 MG/100 ML D5W infusion  0.3 mcg/kg/min IntraVENous CONTINUOUS    carvedilol (COREG) tablet 3.125 mg  3.125 mg Oral BID WITH MEALS    amiodarone (CORDARONE) tablet 200 mg  200 mg Oral Q12H    bacitracin 500 unit/gram packet 1 Packet  1 Packet Topical PRN    atorvastatin (LIPITOR) tablet 10 mg  10 mg Oral DAILY    0.9% sodium chloride infusion  10 mL/hr IntraVENous CONTINUOUS    acetaminophen (TYLENOL) tablet 650 mg  650 mg Oral Q6H PRN    aspirin delayed-release tablet 81 mg  81 mg Oral DAILY    glucagon (GLUCAGEN) injection 1 mg  1 mg IntraMUSCular PRN    glucose chewable tablet 16 g  4 Tab Oral PRN    insulin lispro (HUMALOG) injection   SubCUTAneous AC&HS    magnesium oxide (MAG-OX) tablet 400 mg  400 mg Oral DAILY    0.9% sodium chloride infusion  3 mL/hr IntraVENous CONTINUOUS    sodium chloride (NS) flush 5-10 mL  5-10 mL IntraVENous Q8H    sodium chloride (NS) flush 5-10 mL  5-10 mL IntraVENous PRN    albuterol (PROVENTIL VENTOLIN) nebulizer solution 2.5 mg  2.5 mg Nebulization Q4H PRN    diphenhydrAMINE (BENADRYL) capsule 25 mg  25 mg Oral QHS PRN    oxyCODONE-acetaminophen (PERCOCET) 5-325 mg per tablet 2 Tab  2 Tab Oral Q4H PRN    ELECTROLYTE REPLACEMENT PROTOCOL  1 Each Other PRN     HIDA Scan 2/7/17  Gallbladder emptying study was performed per protocol utilizing 5. 2mCi Tc-99 M  Choletec and 1.86 mcg CCK intravenously. There is normal tracer distribution  throughout the liver. Activity is noted in the gallbladder, common bile duct,  and small bowel. The gallbladder ejection fraction is 31% (normal greater than  or equal to 35%).    IMPRESSION: Abnormal gallbladder emptying study with an EF of only 31%.        IMPRESSION:   1. Acute on chronic systolic heart failure (ICM) - Stage D, NYHA Class IV  2. Severe native coronary artery disease  3. Diabetes Mellitus, Hga1c 13.5  4. History of tobacco abuse  5. Cellulitis  6. CHACORTA on CKD3  7. Pulmonary HTN  8. Persistent atrial fibrillation  9. Hyponatremia  10. Left pleural effusion  11. Acalculous Cholecystitis       PLAN:   1. Continue  IV milrinone 0.3 mcg/kg/min, Follow I/O, Replete electrolytes, Hold ACE-I due to CHACORTA on CKD. Continue low dose coreg and reduce to po bumex 2 mg bid  2.  Continue ASA, statin, low dose BB; too high risk for CABG d/t low LVEF and diabetes out of control  3. Viable LAD and RCA territory and limited LCx viability - discussed high risk Impella supported Left Main PCI with Mary López and Katty, tenatively scheduled for Thursday 2/9/17  4. Discussed risks/benefits including death for left main stent as well as the risk of death with medical therapy alone - encouraged patient to discuss his ACP with his wife and family  11. Lantus and sliding scale insulin, accuchecks, diabetic education  6. IV cefazolin 1 gram q 8 hours for cellulitis and IV zosyn 3.375 g q 8 hours for choleycystitis  7. Smoking cessation counseling, nicotine patch         30 minutes of time were spent with Mr. David Cole explaining the impella supported left main PCI produre and the potential risks and benefits including the risk of VT, cardiac arrest, and even death. He is currently IV milrinone dependent and has considerable viability of myocardium but too high risk for CABG. He understands the risks and benefits as well as alternatives of medical therapy alone and wishes to process with high risk PCI. Thank you for letting me see him with you,    Dorie Rosas MD, [place] [address] Director  Jennifer Roman [address] [place] [address], [address] [place] [address], [address] [place] [address]  Office: [phone]  Fax: [phone]  24 hour VAD/HF Pager: [phone]

## 2017-02-08 NOTE — PROGRESS NOTES
[facility] Progress Note      NAME:  Fernanda Palm. :   1950   MRN:   [id number]   PCP:  None  CARD:   Dr. Courtney Lorenzo    Date:  2017     Fernanda Mijares is a 61 y.o. male with a who presented for further evaluation of severe CAD and systolic heart failure. Subjective:   He was initially seen at [facility] 3 years ago and told that he had heart failure with LVEF 30%. He was lost to follow up due to lack of insurance and has not been seen by a physician in the interim. He complains of progressive fatigue, NAVARRO, PND, and edema over the past year, prompting presentation to [facility]. Pt denies SOB at rest, some NAVARRO when walks to bathroom. Remains on milrinone gtt. Legs improving. Denies N/V or abdominal pain. Past 24 hours:  No significant changes   Remains on milrinone gtt    Objective:     Visit Vitals    /63 (BP 1 Location: Right arm, BP Patient Position: At rest)    Pulse 76    Temp 97.6 °F (36.4 °C)    Resp 16    Ht 5' 9\" (1.753 m)    Wt 189 lb 9.5 oz (86 kg)    SpO2 97%    BMI 28 kg/m2        Physical Exam:    Gen:  WA, WN, NAD  HEENT:  EOMI  Neck:  (+) JVD  CVS:  S1/S2,  +S3  Pul:  Decreased L base, R base occ. Crackles  Abd:  Soft, no tenderness today  Ext:  1+ b/l LE edema, multiple bandages in place, +erythema  Neuro:  No obvious deficits     1901 -  0700  In: 1205.6 [P.O.:1080; I.V.:125.6]  Out: 2530 [Urine:2530]  O2 Flow Rate (L/min): 2 l/min O2 Device: Room air  Temp (24hrs), Av.9 °F (36.6 °C), Min:97.5 °F (36.4 °C), Max:98.6 °F (37 °C)    Past History:     Past Medical History   Diagnosis Date    Cardiomyopathy ([facility] [address] 2017     A. Echo (17):  EF 5-10% with severe GHK,. Mildly dil LA. Mild TR. PASP 46. No past surgical history on file. Social History   Substance Use Topics    Smoking status: Not on file    Smokeless tobacco: Not on file    Alcohol use Not on file        No family history on file. Allergies: Allergies   Allergen Reactions    Heparin (Porcine) Unknown (comments)          Data Review:     CXR:   CXR Results  (Last 48 hours)               02/07/17 1620  XR CHEST PA LAT Final result    Impression:  Impression: Some improvement in the left effusion and improved aeration in the   left lower lobe. Narrative:  Exam:  2 view chest       Indication: Follow-up left pleural effusion       Comparison to 2/5/2017. PA and lateral views demonstrate normal heart size. The left effusion has   decreased in size compared to the prior exam and there has been an improvement   in aeration in the left lower lobe in the interval. PICC line is unchanged in   position. Echocardiogram:   Echo Results  (Last 48 hours)    None        No results found for this visit on 01/20/17. ECG: sinus rhythm on telemetry  LABS:  Recent Results (from the past 24 hour(s))   GLUCOSE, POC    Collection Time: 02/07/17  6:58 PM   Result Value Ref Range    Glucose (POC) 283 (H) 65 - 100 mg/dL    Performed by Toya Gaitan    GLUCOSE, POC    Collection Time: 02/07/17  9:25 PM   Result Value Ref Range    Glucose (POC) 175 (H) 65 - 100 mg/dL    Performed by Unkown     METABOLIC PANEL, COMPREHENSIVE    Collection Time: 02/08/17  3:53 AM   Result Value Ref Range    Sodium 135 (L) 136 - 145 mmol/L    Potassium 3.9 3.5 - 5.1 mmol/L    Chloride 97 97 - 108 mmol/L    CO2 29 21 - 32 mmol/L    Anion gap 9 5 - 15 mmol/L    Glucose 181 (H) 65 - 100 mg/dL    BUN 42 (H) 6 - 20 MG/DL    Creatinine 1.83 (H) 0.70 - 1.30 MG/DL    BUN/Creatinine ratio 23 (H) 12 - 20      GFR est AA 45 (L) >60 ml/min/1.73m2    GFR est non-AA 37 (L) >60 ml/min/1.73m2    Calcium 8.9 8.5 - 10.1 MG/DL    Bilirubin, total 0.4 0.2 - 1.0 MG/DL    ALT (SGPT) 21 12 - 78 U/L    AST (SGOT) 20 15 - 37 U/L    Alk.  phosphatase 158 (H) 45 - 117 U/L    Protein, total 7.2 6.4 - 8.2 g/dL    Albumin 2.8 (L) 3.5 - 5.0 g/dL    Globulin 4.4 (H) 2.0 - 4.0 g/dL    A-G Ratio 0.6 (L) 1.1 - 2.2     MAGNESIUM    Collection Time: 02/08/17  3:53 AM   Result Value Ref Range    Magnesium 2.3 1.6 - 2.4 mg/dL   CBC W/O DIFF    Collection Time: 02/08/17  3:53 AM   Result Value Ref Range    WBC 5.4 4.1 - 11.1 K/uL    RBC 4.35 4.10 - 5.70 M/uL    HGB 10.3 (L) 12.1 - 17.0 g/dL    HCT 34.3 (L) 36.6 - 50.3 %    MCV 78.9 (L) 80.0 - 99.0 FL    MCH 23.7 (L) 26.0 - 34.0 PG    MCHC 30.0 30.0 - 36.5 g/dL    RDW 16.4 (H) 11.5 - 14.5 %    PLATELET 703 725 - 247 K/uL   PTT    Collection Time: 02/08/17  3:53 AM   Result Value Ref Range    aPTT 27.7 22.1 - 32.5 sec    aPTT, therapeutic range     58.0 - 77.0 SECS   GLUCOSE, POC    Collection Time: 02/08/17  6:53 AM   Result Value Ref Range    Glucose (POC) 219 (H) 65 - 100 mg/dL    Performed by [address] [place] [address], POC    Collection Time: 02/08/17 11:33 AM   Result Value Ref Range    Glucose (POC) 216 (H) 65 - 100 mg/dL    Performed by Sridhar Nelson      All Micro Results     Procedure Component Value Units Date/Time    CULTURE, BLOOD, PAIRED [[id number]] Collected:  02/05/17 0322    Order Status:  Completed Specimen:  Blood Updated:  02/08/17 0502     Special Requests: NO SPECIAL REQUESTS        Culture result: NO GROWTH 3 DAYS       CULTURE, BODY FLUID Remonia Glascock STAIN [[id number]] Collected:  01/26/17 1505    Order Status:  Completed Specimen:  Thoracentesis Updated:  01/30/17 1057     Special Requests: NO SPECIAL REQUESTS        GRAM STAIN OCCASIONAL  WBCS SEEN         NO ORGANISMS SEEN        Culture result: NO GROWTH 4 DAYS       CULTURE, Dex Thorpe STAIN [[id number]] Collected:  01/25/17 2134    Order Status:  Completed Specimen:  Leg Updated:  01/27/17 1440     Special Requests: NO SPECIAL REQUESTS        GRAM STAIN RARE  WBCS SEEN         NO ORGANISMS SEEN        Culture result: LIGHT  STREPTOCOCCI, BETA HEMOLYTIC GROUP C  . .. Penicillin and ampicillin are drugs of choice for treatment of beta-hemolytic streptococcal infections. Susceptibility testing of penicillins and beta-lactams approved by the FDA for treatment of beta-hemolytic streptococcal infections need not be performed routinely, because nonsusceptible isolates are extremely rare. CLSI 2012         MODERATE  BETTIE TROPICALIS             Specimen Source   1: Pleural Fluid   CYTOLOGIC INTERPRETATION:   Scattered mesothelial cells with degenerative changes and mixed inflammatory cells   General Categorization   No cells diagnostic for malignancy   Specimen Adequacy   Satisfactory for evaluation        IMPRESSION  1. Markedly dilated left ventricle with 3-D end-diastolic volume index to body  surface area of 153 mL/sq m. Mild eccentric left ventricular hypertrophy with  3-D mass index to body surface area of 85 g/sq m. Severe left ventricular  systolic dysfunction. Severe global hypokinesis with regional variation. 3-D  LVEF 10%. 2. Moderately dilated right ventricle by 3-D volumetric assessment. RV end  diastolic volume indexed to body surface area of 138 mL/sq m. Moderate to severe  right ventricular systolic dysfunction. Global hypokinesis. 3-D RVEF 25%. 3. Apical a tethered mitral valve leaflets. Mild mitral regurgitation. 4. Moderately severe 3+ tricuspid regurgitation. 5. On LGE study, the anterior wall, anteroseptal wall, anteroapical wall, and  anterior lateral wall are largely viable without significant myocardial  infarction. The entire inferior wall and inferoseptal wall are completely viable  without any infarct. The base to mid inferolateral wall demonstrate a near  transmural greater than 75% thickness infarct with minimal or no viable  myocardium in this territory. The distal inferolateral wall, apical lateral wall  does not demonstrate any infarct and are largely viable. Based on the viability  imaging, the entire LAD territory is largely viable and should recover upon  revascularization.  The entire RCA territory is largely viable and should recover  upon revascularization. The LCx territory demonstrate medium-size infarct with  limited viability. 6. Large left-sided pleural effusion with passive atelectasis of the left lung. 7. Dilated branch pulmonary arteries suggest pulmonary hypertension. 8. Markedly dilated left atrium measuring 59 x 55 mm. Moderately dilated right  atrium measuring 46 x 56 mm.     Medications reviewed:    Current Facility-Administered Medications   Medication Dose Route Frequency    bumetanide (BUMEX) tablet 2 mg  2 mg Oral TID    docusate sodium (COLACE) capsule 100 mg  100 mg Oral DAILY PRN    piperacillin-tazobactam (ZOSYN) 3.375 g in 0.9% sodium chloride (MBP/ADV) 100 mL  3.375 g IntraVENous Q8H    nicotine (NICODERM CQ) 21 mg/24 hr patch 1 Patch  1 Patch TransDERmal DAILY    glimepiride (AMARYL) tablet 4 mg  4 mg Oral ACB    insulin glargine (LANTUS) injection 10 Units  10 Units SubCUTAneous DAILY    gabapentin (NEURONTIN) capsule 100 mg  100 mg Oral BID    milrinone (PRIMACOR) 20 MG/100 ML D5W infusion  0.3 mcg/kg/min IntraVENous CONTINUOUS    carvedilol (COREG) tablet 3.125 mg  3.125 mg Oral BID WITH MEALS    amiodarone (CORDARONE) tablet 200 mg  200 mg Oral Q12H    bacitracin 500 unit/gram packet 1 Packet  1 Packet Topical PRN    atorvastatin (LIPITOR) tablet 10 mg  10 mg Oral DAILY    0.9% sodium chloride infusion  10 mL/hr IntraVENous CONTINUOUS    acetaminophen (TYLENOL) tablet 650 mg  650 mg Oral Q6H PRN    aspirin delayed-release tablet 81 mg  81 mg Oral DAILY    glucagon (GLUCAGEN) injection 1 mg  1 mg IntraMUSCular PRN    glucose chewable tablet 16 g  4 Tab Oral PRN    insulin lispro (HUMALOG) injection   SubCUTAneous AC&HS    magnesium oxide (MAG-OX) tablet 400 mg  400 mg Oral DAILY    0.9% sodium chloride infusion  3 mL/hr IntraVENous CONTINUOUS    sodium chloride (NS) flush 5-10 mL  5-10 mL IntraVENous Q8H    sodium chloride (NS) flush 5-10 mL  5-10 mL IntraVENous PRN    albuterol (PROVENTIL VENTOLIN) nebulizer solution 2.5 mg  2.5 mg Nebulization Q4H PRN    diphenhydrAMINE (BENADRYL) capsule 25 mg  25 mg Oral QHS PRN    oxyCODONE-acetaminophen (PERCOCET) 5-325 mg per tablet 2 Tab  2 Tab Oral Q4H PRN    ELECTROLYTE REPLACEMENT PROTOCOL  1 Each Other PRN     PFTs  FVC 2.62 60% predicted             FEV1 1.51 50% predicted             FEV1/FVC 54.57    Ltd abd ultrasouond - FINDINGS: The gallbladder is moderately distended. The gallbladder wall appears  thickened measuring 5 mm. There is a small amount of pericholecystic fluid. There is tenderness to probe palpation over the gallbladder fossa. No gallstone  is shown. No intrahepatic biliary ductal dilation is demonstrated. Common bile  duct diameter measures 10 mm, similar to that measured previously.     The liver is again shown to be enlarged and diffusely echogenic in texture  though without indication of focal hepatic mass lesion. Portal venous flow is  hepatopedal. The visualized pancreatic head and right kidney appear normal with  right renal length 10.6 cm.     IMPRESSION  IMPRESSION: Distended gallbladder with gallbladder wall thickening,  pericholecystic fluid and tenderness to probe palpation during exam. Findings  are consistent with acalculus cholecystitis. CXR pa/lateral from 2/7/17 - Impression: Some improvement in the left effusion and improved aeration in the  left lower lobe.       IMPRESSION/Plan:   Acute on chronic systolic heart failure (ICM) - Stage D, NYHA Class IV - continue Milrinone at   0.3 mcg/kg/min, Continue low dose Coreg, Bumex to 2 mg TID, Strict I/Os, Replete electrolytes prn, Hold ACE-I due to CHACORTA on CKD. Daily weights - weight up 1 lb from yesterday. Severe native coronary artery disease w/ viability - on ASA, statin, low dose BB; no ACEi d/t renal dysfunction, tentative plan for high risk PCI w/ Impella support on Thursday at 8:15am by Dr. Ralph Kirkland.   Pt w/ multiple questions concerning risks of procedure, will see if  Emmy Bliss can call patient to discuss. Dr. Indio Pinedo    Acalculus Cholecystitis - no drain required for no, close monitor, continue Zosyn. GI and surgery following. ALP improved. HIDA showed EF 31%. L pleural effusion - slightly improved, cont. Diuretics, monitor      CECILIA - REJI positive, platlets 170 today - close f/u NO HEPARIN    Hyponatremia - improved, monitor    Diabetes Mellitus - cont. Current treatment coverage, close monitor of BS. DTC following    Cellulitis - cont. Po abx per ID.   Legs improving - abx per ID    H/o Tobacco abuse - smoking cessation, nicotine patch    CHACORTA on CKD3 - renal following - Bumex to 3 mg TID    Anemia - stable, monitor    Pulmonary HTN -      Pt seen with Dr. Naeem Ro and d/w Dr. Raza , PA-C  Jennifer Roman [id number]    Saw patient, agree with above  Risk of morbidity and mortality - high  Medical decision making - high complexity

## 2017-02-08 NOTE — PROGRESS NOTES
Infectious Diseases Progress Note    Antibiotic Summary:  Levaquin  --   Vancomycin  --   Ancef    -- 2/3  Keflex   2/3 --   Zosyn   -- present      Subjective:     Epigastric and RUQ pain is about the same and has been present for months    Objective:     Vitals:   Visit Vitals    /76 (BP 1 Location: Right arm, BP Patient Position: At rest;Sitting)    Pulse 85    Temp 98.4 °F (36.9 °C)    Resp 18    Ht 5' 9\" (1.753 m)    Wt 85.6 kg (188 lb 11.4 oz)    SpO2 100%    BMI 27.87 kg/m2        Tmax:  Temp (24hrs), Av °F (36.7 °C), Min:97.5 °F (36.4 °C), Max:98.5 °F (36.9 °C)      Exam:  General appearance: alert, no distress  Lungs: few rales at bases  Heart: RRR  Abdomen: tender in the RUQ  epigastrium  Left leg: much improved  Right leg: much improved    IV Lines: Left PICC inserted     Labs:    Recent Labs      17   0249  17   0326  17   0322   WBC  5.2  5.9  5.6   HGB  9.4*  9.9*  10.2*   PLT  164  163  153   BUN  45*  45*  50*   CREA  2.06*  1.89*  1.86*   TBILI  0.4  0.5  0.5   SGOT  23  30  24   AP  161*  180*  175*     Culture right leg ulcers  = group C Streptococcus    US abdomen 2/3 = \"Distended gallbladder with gallbladder wall thickening,  pericholecystic fluid and tenderness to probe palpation during exam. Findings  are consistent with acalculus cholecystitis\"    HIDA on  = visualization with filling of the GB, CBD, and small bowel -- GB EF 31%    Assessment:     1. Bilateral venous stasis disease of the LEs +/- cellulitis: Much better     2. RUQ tenderness and abnormal GB US: Clinically, he says the symptoms are unchanged for months or even years. Remains on Zosyn. HIDA shows visualization of the GB with decreased EF     3. OHD -- IHD/CAD; CHF; pulm HTN     4. CRF with acute exacerbation     5. NIDDM -- new diagnosis     6. Probable COPD -- heavy smoking since age 7      9.  Anemia -- HC/MC -- positive family history of colon cancer -- may need GI W/U    Plan:     1.  Sandoval Vivas MD

## 2017-02-08 NOTE — PROGRESS NOTES
1930: Bedside and Verbal shift change report given to Vishal Calles RN (oncoming nurse) by Marizol Gaines (offgoing nurse). Report included the following information SBAR, Kardex, Procedure Summary, Intake/Output, MAR and Recent Results.

## 2017-02-08 NOTE — PROGRESS NOTES
[facility]   [address] [address] [facility] [address], [facility] [address], [facility]  Phone: [phone]   [phone]       Nephrology Progress Note  Martin Kilgore.     1950     [id number]  Date of Admission : 1/20/2017 02/08/17    CC: Follow up for CKD/ARF      Assessment and Plan   CHACORTA on CKD :  -changed him back to Bumex 2 mg TID  - serial labs     CKD :  Baseline Cr unknown   Presumed to be 2/2 untreated DM, HTN and CMP at this time     Thrombocytopenia :  -  CECILIA Ab +ve. REJI +VE --> avoid Heparin in future     Hyponatremia :  - combination of  CHF + SIADH from chronic alcoholism  - Na at 134 today  - No further tolvaptan for now  - daily Na levels for now    Acute on Chronic Systolic CHF w/ severe CMP  - echo with EF 5-10%, severely dilated LV, severe TR  - Multivessel CAD : MRI showed viable myocardium   - on Milrinone  - High PCI planned from 2/7    Cellulitis RLE w/ Pustular lesions : resolved    Pulm HTN     Chronic smoker w/VENANCIO -PNA    Acalculous Cholecystitis : per surgery       Interval History:  Seen and examined   D/w Dr Thorpe Seen   He has more edema today and UOP dropped  No N/V/CP      Review of Systems: Pertinent items are noted in HPI.     Current Medications:   Current Facility-Administered Medications   Medication Dose Route Frequency    bumetanide (BUMEX) tablet 2 mg  2 mg Oral TID    docusate sodium (COLACE) capsule 100 mg  100 mg Oral DAILY PRN    piperacillin-tazobactam (ZOSYN) 3.375 g in 0.9% sodium chloride (MBP/ADV) 100 mL  3.375 g IntraVENous Q8H    nicotine (NICODERM CQ) 21 mg/24 hr patch 1 Patch  1 Patch TransDERmal DAILY    glimepiride (AMARYL) tablet 4 mg  4 mg Oral ACB    insulin glargine (LANTUS) injection 10 Units  10 Units SubCUTAneous DAILY    gabapentin (NEURONTIN) capsule 100 mg  100 mg Oral BID    milrinone (PRIMACOR) 20 MG/100 ML D5W infusion  0.3 mcg/kg/min IntraVENous CONTINUOUS    carvedilol (COREG) tablet 3.125 mg  3.125 mg Oral BID WITH MEALS    amiodarone (CORDARONE) tablet 200 mg  200 mg Oral Q12H    bacitracin 500 unit/gram packet 1 Packet  1 Packet Topical PRN    atorvastatin (LIPITOR) tablet 10 mg  10 mg Oral DAILY    0.9% sodium chloride infusion  10 mL/hr IntraVENous CONTINUOUS    acetaminophen (TYLENOL) tablet 650 mg  650 mg Oral Q6H PRN    aspirin delayed-release tablet 81 mg  81 mg Oral DAILY    glucagon (GLUCAGEN) injection 1 mg  1 mg IntraMUSCular PRN    glucose chewable tablet 16 g  4 Tab Oral PRN    insulin lispro (HUMALOG) injection   SubCUTAneous AC&HS    magnesium oxide (MAG-OX) tablet 400 mg  400 mg Oral DAILY    0.9% sodium chloride infusion  3 mL/hr IntraVENous CONTINUOUS    sodium chloride (NS) flush 5-10 mL  5-10 mL IntraVENous Q8H    sodium chloride (NS) flush 5-10 mL  5-10 mL IntraVENous PRN    albuterol (PROVENTIL VENTOLIN) nebulizer solution 2.5 mg  2.5 mg Nebulization Q4H PRN    diphenhydrAMINE (BENADRYL) capsule 25 mg  25 mg Oral QHS PRN    oxyCODONE-acetaminophen (PERCOCET) 5-325 mg per tablet 2 Tab  2 Tab Oral Q4H PRN    ELECTROLYTE REPLACEMENT PROTOCOL  1 Each Other PRN      Allergies   Allergen Reactions    Heparin (Porcine) Unknown (comments)       Objective:  Vitals:    Vitals:    02/08/17 0345 02/08/17 0349 02/08/17 0738 02/08/17 1106   BP: 116/74  118/69 116/63   Pulse: 85  85 76   Resp: 18  18 16   Temp: 97.6 °F (36.4 °C)  98.6 °F (37 °C) 97.6 °F (36.4 °C)   SpO2: 96%  96% 97%   Weight:  86 kg (189 lb 9.5 oz)     Height:         Intake and Output:  02/08 0701 - 02/08 1900  In: 887.9 [P.O.:720;  I.V.:167.9]  Out: 200 [Urine:200]  02/06 1901 - 02/08 0700  In: 1205.6 [P.O.:1080; I.V.:125.6]  Out: 2530 [Urine:2530]    Physical Examination:  General: Appears stated age  Resp:  Lungs mostly clear  CV:  RRR,  no murmur or rub,+ LE edema  GI:  Soft, distended, NT, + Bowel sounds, no hepatosplenomegaly  Neurologic:  Non focal  Skin:  RLE cellulitis - resolving   :  No mendoza    []    High complexity decision making was performed  []    Patient is at high-risk of decompensation with multiple organ involvement    Lab Data Personally Reviewed: I have reviewed all the pertinent labs, microbiology data and radiology studies during assessment. Recent Labs      02/08/17 0353  02/07/17 0249 02/06/17 0326   NA  135*  134*  133*   K  3.9  3.7  3.9   CL  97  98  97   CO2  29  30  27   GLU  181*  96  173*   BUN  42*  45*  45*   CREA  1.83*  2.06*  1.89*   CA  8.9  8.8  8.8   MG  2.3  2.0  2.1   ALB  2.8*  2.7*  2.7*   SGOT  20  23  30   ALT  21  19  19     Recent Labs      02/08/17 0353 02/07/17 0249 02/06/17   0326   WBC  5.4  5.2  5.9   HGB  10.3*  9.4*  9.9*   HCT  34.3*  30.6*  31.9*   PLT  170  164  163     No results found for: SDES  Lab Results   Component Value Date/Time    Culture result: NO GROWTH 3 DAYS 02/05/2017 03:22 AM    Culture result: NO GROWTH 4 DAYS 01/26/2017 03:05 PM    Culture result:  01/25/2017 09:34 PM     LIGHT  STREPTOCOCCI, BETA HEMOLYTIC GROUP C  . .. Penicillin and ampicillin are drugs of choice for treatment of beta-hemolytic streptococcal infections. Susceptibility testing of penicillins and beta-lactams approved by the FDA for treatment of beta-hemolytic streptococcal infections need not be performed routinely, because nonsusceptible isolates are extremely rare.  CLSI 2012      Culture result: MODERATE  CANDIDA TROPICALIS   01/25/2017 09:34 PM    Culture result: NO SIGNIFICANT GROWTH 01/19/2017 01:35 AM     Recent Results (from the past 24 hour(s))   GLUCOSE, POC    Collection Time: 02/07/17  6:58 PM   Result Value Ref Range    Glucose (POC) 283 (H) 65 - 100 mg/dL    Performed by Lanny Ngo    GLUCOSE, POC    Collection Time: 02/07/17  9:25 PM   Result Value Ref Range    Glucose (POC) 175 (H) 65 - 100 mg/dL    Performed by Scottwvivian Philippe    METABOLIC PANEL, COMPREHENSIVE    Collection Time: 02/08/17  3:53 AM   Result Value Ref Range    Sodium 135 (L) 136 - 145 mmol/L    Potassium 3.9 3.5 - 5.1 mmol/L    Chloride 97 97 - 108 mmol/L    CO2 29 21 - 32 mmol/L    Anion gap 9 5 - 15 mmol/L    Glucose 181 (H) 65 - 100 mg/dL    BUN 42 (H) 6 - 20 MG/DL    Creatinine 1.83 (H) 0.70 - 1.30 MG/DL    BUN/Creatinine ratio 23 (H) 12 - 20      GFR est AA 45 (L) >60 ml/min/1.73m2    GFR est non-AA 37 (L) >60 ml/min/1.73m2    Calcium 8.9 8.5 - 10.1 MG/DL    Bilirubin, total 0.4 0.2 - 1.0 MG/DL    ALT (SGPT) 21 12 - 78 U/L    AST (SGOT) 20 15 - 37 U/L    Alk. phosphatase 158 (H) 45 - 117 U/L    Protein, total 7.2 6.4 - 8.2 g/dL    Albumin 2.8 (L) 3.5 - 5.0 g/dL    Globulin 4.4 (H) 2.0 - 4.0 g/dL    A-G Ratio 0.6 (L) 1.1 - 2.2     MAGNESIUM    Collection Time: 02/08/17  3:53 AM   Result Value Ref Range    Magnesium 2.3 1.6 - 2.4 mg/dL   CBC W/O DIFF    Collection Time: 02/08/17  3:53 AM   Result Value Ref Range    WBC 5.4 4.1 - 11.1 K/uL    RBC 4.35 4.10 - 5.70 M/uL    HGB 10.3 (L) 12.1 - 17.0 g/dL    HCT 34.3 (L) 36.6 - 50.3 %    MCV 78.9 (L) 80.0 - 99.0 FL    MCH 23.7 (L) 26.0 - 34.0 PG    MCHC 30.0 30.0 - 36.5 g/dL    RDW 16.4 (H) 11.5 - 14.5 %    PLATELET 138 033 - 042 K/uL   PTT    Collection Time: 02/08/17  3:53 AM   Result Value Ref Range    aPTT 27.7 22.1 - 32.5 sec    aPTT, therapeutic range     58.0 - 77.0 SECS   GLUCOSE, POC    Collection Time: 02/08/17  6:53 AM   Result Value Ref Range    Glucose (POC) 219 (H) 65 - 100 mg/dL    Performed by [id number], POC    Collection Time: 02/08/17 11:33 AM   Result Value Ref Range    Glucose (POC) 216 (H) 65 - 100 mg/dL    Performed by Armand Dejesus            I have reviewed the flowsheets. Chart and Pertinent Notes have been reviewed. No change in PMH ,family and social history from Consult note.       Black Gallego MD

## 2017-02-08 NOTE — PROGRESS NOTES
Follow up visit at patient request.  Delon Lam reported that he is scheduled for heart cath procedure tomorrow morning at 8:15. Expressed some concern about the procedure while maintaining a hopeful demeanor. Shared that he has been praying more than usual regarding this procedure. Requested and was provided spoken  prayer. Will attempt to visit tomorrow post surgery. [facility]'s Staff  (Greg Alcantara Patient Care Specialist)   Paging Service [phone])

## 2017-02-08 NOTE — PROGRESS NOTES
Problem: Falls - Risk of  Goal: *Absence of falls  Outcome: Progressing Towards Goal  Pt remained free from falls overnight. Goal: *Knowledge of fall prevention  Outcome: Progressing Towards Goal  Pt aware of all fall prevention methods in place and acts accordingly. Problem: Pressure Ulcer - Risk of  Goal: *Prevention of pressure ulcer  Outcome: Progressing Towards Goal  Pt aware of pressure reducing methods in place and acts in a way demonstrating knowledge and compliance. Problem: Heart Failure: Day 5  Goal: Off Pathway (Use only if patient is Off Pathway)  Outcome: Progressing Towards Goal  Pt is hemodynamically stable, tolerating the diet and progressively ambulating and participating in his care.

## 2017-02-08 NOTE — PROGRESS NOTES
[address]  [address], [address]       GI PROGRESS NOTE  Kathy Saleh, [facility]  [phone] office  [phone] NP in-hospital cell phone M-F until 4:30  After 5pm or on weekends, please call  for physician on call      NAME: Fernanda Wang. :  1950   MRN:  [id number]       Subjective:     No complaints    Objective:     VITALS:   Last 24hrs VS reviewed since prior progress note. Most recent are:  Visit Vitals    /72 (BP 1 Location: Right arm, BP Patient Position: At rest)    Pulse 79    Temp 97.8 °F (36.6 °C)    Resp 16    Ht 5' 9\" (1.753 m)    Wt 86 kg (189 lb 9.5 oz)    SpO2 95%    BMI 28 kg/m2       PHYSICAL EXAM:  General: Cooperative, no acute distress    Neurologic:  Alert and oriented X 3. HEENT: EOMI. Lungs:  CTA bilaterally. No wheezing  Heart:  S1 S2, regular rhythm, no murmur   Abdomen: Soft, distended, non-tender. +Bowel sounds  Extremities: ++ edema  Psych:   Good insight. Not anxious or agitated.     Lab Data Reviewed:     Recent Results (from the past 24 hour(s))   GLUCOSE, POC    Collection Time: 17  6:58 PM   Result Value Ref Range    Glucose (POC) 283 (H) 65 - 100 mg/dL    Performed by Hillary López    GLUCOSE, POC    Collection Time: 17  9:25 PM   Result Value Ref Range    Glucose (POC) 175 (H) 65 - 100 mg/dL    Performed by Selena Philippe    METABOLIC PANEL, COMPREHENSIVE    Collection Time: 17  3:53 AM   Result Value Ref Range    Sodium 135 (L) 136 - 145 mmol/L    Potassium 3.9 3.5 - 5.1 mmol/L    Chloride 97 97 - 108 mmol/L    CO2 29 21 - 32 mmol/L    Anion gap 9 5 - 15 mmol/L    Glucose 181 (H) 65 - 100 mg/dL    BUN 42 (H) 6 - 20 MG/DL    Creatinine 1.83 (H) 0.70 - 1.30 MG/DL    BUN/Creatinine ratio 23 (H) 12 - 20      GFR est AA 45 (L) >60 ml/min/1.73m2    GFR est non-AA 37 (L) >60 ml/min/1.73m2    Calcium 8.9 8.5 - 10.1 MG/DL    Bilirubin, total 0.4 0.2 - 1.0 MG/DL    ALT (SGPT) 21 12 - 78 U/L AST (SGOT) 20 15 - 37 U/L    Alk. phosphatase 158 (H) 45 - 117 U/L    Protein, total 7.2 6.4 - 8.2 g/dL    Albumin 2.8 (L) 3.5 - 5.0 g/dL    Globulin 4.4 (H) 2.0 - 4.0 g/dL    A-G Ratio 0.6 (L) 1.1 - 2.2     MAGNESIUM    Collection Time: 02/08/17  3:53 AM   Result Value Ref Range    Magnesium 2.3 1.6 - 2.4 mg/dL   CBC W/O DIFF    Collection Time: 02/08/17  3:53 AM   Result Value Ref Range    WBC 5.4 4.1 - 11.1 K/uL    RBC 4.35 4.10 - 5.70 M/uL    HGB 10.3 (L) 12.1 - 17.0 g/dL    HCT 34.3 (L) 36.6 - 50.3 %    MCV 78.9 (L) 80.0 - 99.0 FL    MCH 23.7 (L) 26.0 - 34.0 PG    MCHC 30.0 30.0 - 36.5 g/dL    RDW 16.4 (H) 11.5 - 14.5 %    PLATELET 625 299 - 536 K/uL   PTT    Collection Time: 02/08/17  3:53 AM   Result Value Ref Range    aPTT 27.7 22.1 - 32.5 sec    aPTT, therapeutic range     58.0 - 77.0 SECS   GLUCOSE, POC    Collection Time: 02/08/17  6:53 AM   Result Value Ref Range    Glucose (POC) 219 (H) 65 - 100 mg/dL    Performed by [address] [place] [address], POC    Collection Time: 02/08/17 11:33 AM   Result Value Ref Range    Glucose (POC) 216 (H) 65 - 100 mg/dL    Performed by Ab Alexander, POC    Collection Time: 02/08/17  4:45 PM   Result Value Ref Range    Glucose (POC) 156 (H) 65 - 100 mg/dL    Performed by Kiley Powers           Assessment:   · Cholecystitis: HIDA abnormal  · Elevated AP: improving     Patient Active Problem List   Diagnosis Code    Acute systolic (congestive) heart failure (HCC) I50.21    Bilateral lower extremity edema R60.0    Shortness of breath R06.02    Acute renal failure (ARF) ([address] N17.9    Hyperglycemia due to type 2 diabetes mellitus ([facility] E11.65    Venous stasis dermatitis of both lower extremities I83.11, I83.12    Hypoxia R09.02    Pulmonary edema J81.1    Sinus tachycardia R00.0    Ischemic cardiomyopathy X96.1    Systolic heart failure (HCC) I50.20     Plan:   · AP improved  · Continue to trend  · No surgical intervention     Signed By: Chapman Phalen. Niki Jones NP     2/8/2017  11:42 AM         GI Attending: No plan for intervention per Surgery team. No indication for further GI studies at this time. Will sign off for now. Please call back with questions. Evangelist Hong MD

## 2017-02-08 NOTE — DIABETES MGMT
DTC Progress and Education Note    Chart reviewed on Fernanda Jesus Lee. Due to hyperglycemia and carbohydrate counting education provided. Pt will be NPO starting midnight, prior to surgery. Recommendations/ Comments: If appropriate, consider: changing correction insulin to normal sensitivity scale. Patient is a 61 y.o. male with new onset of diabetes. Visited pt today to provide insulin education. Pt stated that he has been practicing while in the hospital. Also, he was provided insulin instruction on 1/19 and he remembers well. Today discussed carbohydrate counting, pt voiced understanding. A1c:   Lab Results   Component Value Date/Time    Hemoglobin A1c 13.5 01/19/2017 05:05 PM    Hemoglobin A1c 13.4 01/18/2017 11:11 PM       Recent Glucose Results: Lab Results   Component Value Date/Time     (H) 02/08/2017 03:53 AM    GLUCPOC 216 (H) 02/08/2017 11:33 AM    GLUCPOC 219 (H) 02/08/2017 06:53 AM    GLUCPOC 175 (H) 02/07/2017 09:25 PM        Lab Results   Component Value Date/Time    Creatinine 1.83 02/08/2017 03:53 AM       Active Orders   Diet    DIET DIABETIC WITH OPTIONS Consistent Carb 2200kcal; Regular; 2 GM NA (House Low NA); FR 2000ML        PO intake: Patient Vitals for the past 72 hrs:   % Diet Eaten   02/08/17 1043 100 %   02/07/17 1400 100 %   02/07/17 0900 0 %   02/06/17 1240 100 %   02/06/17 0852 100 %       Current hospital DM medication: Lantus 10 units, Glimepiride 4 mg daily and Humalog for correction, high sensitivity scale. Will continue to follow as needed.     Thank you,  Justin Linn RD

## 2017-02-08 NOTE — PROGRESS NOTES
Called to pt's room for PRN respiratory TX. Pt sitting on edge of bed eating dinner. States he wahts to continue eating at this time instead of receiving respiratory tx. Pt in NAD.  Will return later at pt's request.

## 2017-02-08 NOTE — PROGRESS NOTES
Tolerating diet without increase in pain. No indication for cholecystectomy or cholecystostomy. Continue current treatment plan. Will sign off.

## 2017-02-08 NOTE — PROGRESS NOTES
0730: Received report from Toma Aragon, assumed care. Pt sitting up in chair watching tv, no needs. 1930: Uneventful shift. Bedside and Verbal shift change report given to Toma Aragon (oncoming nurse) by Jenna Castañeda (offgoing nurse). Report included the following information SBAR, Kardex, MAR and Recent Results. 1945: Wound care completed per orders.

## 2017-02-08 NOTE — PROGRESS NOTES
Reduced UOP   Worsening LE edema   HIDA scan noted and surgery opinion pending   Increased Bumex to 2 mg TID  Full note to follow   Will d/w F team       Maria Isabel Chan MD, [facility].   Office :[phone]  Fax: [phone]

## 2017-02-09 ENCOUNTER — APPOINTMENT (OUTPATIENT)
Dept: GENERAL RADIOLOGY | Age: 67
DRG: 215 | End: 2017-02-09
Attending: PHYSICIAN ASSISTANT
Payer: SELF-PAY

## 2017-02-09 LAB
ACT BLD: 301 SECS (ref 79–138)
ACT BLD: 363 SECS (ref 79–138)
ALBUMIN SERPL BCP-MCNC: 2.8 G/DL (ref 3.5–5)
ALBUMIN SERPL BCP-MCNC: 2.9 G/DL (ref 3.5–5)
ALBUMIN/GLOB SERPL: 0.7 {RATIO} (ref 1.1–2.2)
ALBUMIN/GLOB SERPL: 0.7 {RATIO} (ref 1.1–2.2)
ALP SERPL-CCNC: 139 U/L (ref 45–117)
ALP SERPL-CCNC: 155 U/L (ref 45–117)
ALT SERPL-CCNC: 18 U/L (ref 12–78)
ALT SERPL-CCNC: 20 U/L (ref 12–78)
ANION GAP BLD CALC-SCNC: 10 MMOL/L (ref 5–15)
ANION GAP BLD CALC-SCNC: 8 MMOL/L (ref 5–15)
APTT PPP: 113.9 SEC (ref 22.1–32.5)
APTT PPP: 28 SEC (ref 22.1–32.5)
APTT PPP: 65 SEC (ref 22.1–32.5)
APTT PPP: 75.8 SEC (ref 22.1–32.5)
APTT PPP: 86.1 SEC (ref 22.1–32.5)
AST SERPL W P-5'-P-CCNC: 17 U/L (ref 15–37)
AST SERPL W P-5'-P-CCNC: 26 U/L (ref 15–37)
BILIRUB SERPL-MCNC: 0.5 MG/DL (ref 0.2–1)
BILIRUB SERPL-MCNC: 0.8 MG/DL (ref 0.2–1)
BUN SERPL-MCNC: 41 MG/DL (ref 6–20)
BUN SERPL-MCNC: 46 MG/DL (ref 6–20)
BUN/CREAT SERPL: 22 (ref 12–20)
BUN/CREAT SERPL: 22 (ref 12–20)
CALCIUM SERPL-MCNC: 8.1 MG/DL (ref 8.5–10.1)
CALCIUM SERPL-MCNC: 8.7 MG/DL (ref 8.5–10.1)
CHLORIDE SERPL-SCNC: 98 MMOL/L (ref 97–108)
CHLORIDE SERPL-SCNC: 98 MMOL/L (ref 97–108)
CO2 SERPL-SCNC: 27 MMOL/L (ref 21–32)
CO2 SERPL-SCNC: 28 MMOL/L (ref 21–32)
CREAT SERPL-MCNC: 1.85 MG/DL (ref 0.7–1.3)
CREAT SERPL-MCNC: 2.05 MG/DL (ref 0.7–1.3)
ERYTHROCYTE [DISTWIDTH] IN BLOOD BY AUTOMATED COUNT: 16.6 % (ref 11.5–14.5)
ERYTHROCYTE [DISTWIDTH] IN BLOOD BY AUTOMATED COUNT: 16.6 % (ref 11.5–14.5)
GLOBULIN SER CALC-MCNC: 3.9 G/DL (ref 2–4)
GLOBULIN SER CALC-MCNC: 4.4 G/DL (ref 2–4)
GLUCOSE BLD STRIP.AUTO-MCNC: 101 MG/DL (ref 65–100)
GLUCOSE BLD STRIP.AUTO-MCNC: 106 MG/DL (ref 65–100)
GLUCOSE BLD STRIP.AUTO-MCNC: 116 MG/DL (ref 65–100)
GLUCOSE BLD STRIP.AUTO-MCNC: 130 MG/DL (ref 65–100)
GLUCOSE SERPL-MCNC: 109 MG/DL (ref 65–100)
GLUCOSE SERPL-MCNC: 133 MG/DL (ref 65–100)
HCT VFR BLD AUTO: 30.9 % (ref 36.6–50.3)
HCT VFR BLD AUTO: 31.7 % (ref 36.6–50.3)
HGB BLD-MCNC: 9.4 G/DL (ref 12.1–17)
HGB BLD-MCNC: 9.8 G/DL (ref 12.1–17)
LDH SERPL L TO P-CCNC: 327 U/L (ref 85–241)
MAGNESIUM SERPL-MCNC: 2.2 MG/DL (ref 1.6–2.4)
MCH RBC QN AUTO: 23.5 PG (ref 26–34)
MCH RBC QN AUTO: 23.9 PG (ref 26–34)
MCHC RBC AUTO-ENTMCNC: 30.4 G/DL (ref 30–36.5)
MCHC RBC AUTO-ENTMCNC: 30.9 G/DL (ref 30–36.5)
MCV RBC AUTO: 77.3 FL (ref 80–99)
MCV RBC AUTO: 77.3 FL (ref 80–99)
PLATELET # BLD AUTO: 155 K/UL (ref 150–400)
PLATELET # BLD AUTO: 161 K/UL (ref 150–400)
POTASSIUM SERPL-SCNC: 3.7 MMOL/L (ref 3.5–5.1)
POTASSIUM SERPL-SCNC: 4 MMOL/L (ref 3.5–5.1)
PROT SERPL-MCNC: 6.7 G/DL (ref 6.4–8.2)
PROT SERPL-MCNC: 7.3 G/DL (ref 6.4–8.2)
RBC # BLD AUTO: 4 M/UL (ref 4.1–5.7)
RBC # BLD AUTO: 4.1 M/UL (ref 4.1–5.7)
SERVICE CMNT-IMP: ABNORMAL
SODIUM SERPL-SCNC: 134 MMOL/L (ref 136–145)
SODIUM SERPL-SCNC: 135 MMOL/L (ref 136–145)
THERAPEUTIC RANGE,PTTT: ABNORMAL SECS (ref 58–77)
THERAPEUTIC RANGE,PTTT: NORMAL SECS (ref 58–77)
WBC # BLD AUTO: 5.7 K/UL (ref 4.1–11.1)
WBC # BLD AUTO: 5.8 K/UL (ref 4.1–11.1)

## 2017-02-09 PROCEDURE — C1887 CATHETER, GUIDING: HCPCS

## 2017-02-09 PROCEDURE — 83615 LACTATE (LD) (LDH) ENZYME: CPT | Performed by: PHYSICIAN ASSISTANT

## 2017-02-09 PROCEDURE — 74011250636 HC RX REV CODE- 250/636: Performed by: INTERNAL MEDICINE

## 2017-02-09 PROCEDURE — C1894 INTRO/SHEATH, NON-LASER: HCPCS

## 2017-02-09 PROCEDURE — C1769 GUIDE WIRE: HCPCS

## 2017-02-09 PROCEDURE — 74011000258 HC RX REV CODE- 258: Performed by: PHYSICIAN ASSISTANT

## 2017-02-09 PROCEDURE — C1725 CATH, TRANSLUMIN NON-LASER: HCPCS

## 2017-02-09 PROCEDURE — 77030004870 HC CATH CV SWN EDWD -C

## 2017-02-09 PROCEDURE — 80053 COMPREHEN METABOLIC PANEL: CPT | Performed by: PHYSICIAN ASSISTANT

## 2017-02-09 PROCEDURE — 93306 TTE W/DOPPLER COMPLETE: CPT

## 2017-02-09 PROCEDURE — 027137Z DILATION OF CORONARY ARTERY, TWO ARTERIES WITH FOUR OR MORE DRUG-ELUTING INTRALUMINAL DEVICES, PERCUTANEOUS APPROACH: ICD-10-PCS | Performed by: SPECIALIST

## 2017-02-09 PROCEDURE — 85027 COMPLETE CBC AUTOMATED: CPT | Performed by: PHYSICIAN ASSISTANT

## 2017-02-09 PROCEDURE — 77030013744

## 2017-02-09 PROCEDURE — C1751 CATH, INF, PER/CENT/MIDLINE: HCPCS

## 2017-02-09 PROCEDURE — 77030029641 HC PMP CARD PERC IMPELLA CP ABIM -L

## 2017-02-09 PROCEDURE — 85730 THROMBOPLASTIN TIME PARTIAL: CPT | Performed by: SPECIALIST

## 2017-02-09 PROCEDURE — 74011250637 HC RX REV CODE- 250/637: Performed by: PHYSICIAN ASSISTANT

## 2017-02-09 PROCEDURE — 65610000003 HC RM ICU SURGICAL

## 2017-02-09 PROCEDURE — 5A0221D ASSISTANCE WITH CARDIAC OUTPUT USING IMPELLER PUMP, CONTINUOUS: ICD-10-PCS | Performed by: SPECIALIST

## 2017-02-09 PROCEDURE — 85730 THROMBOPLASTIN TIME PARTIAL: CPT | Performed by: THORACIC SURGERY (CARDIOTHORACIC VASCULAR SURGERY)

## 2017-02-09 PROCEDURE — 36415 COLL VENOUS BLD VENIPUNCTURE: CPT | Performed by: THORACIC SURGERY (CARDIOTHORACIC VASCULAR SURGERY)

## 2017-02-09 PROCEDURE — 99152 MOD SED SAME PHYS/QHP 5/>YRS: CPT

## 2017-02-09 PROCEDURE — C1874 STENT, COATED/COV W/DEL SYS: HCPCS

## 2017-02-09 PROCEDURE — 93005 ELECTROCARDIOGRAM TRACING: CPT

## 2017-02-09 PROCEDURE — 74011636320 HC RX REV CODE- 636/320: Performed by: SPECIALIST

## 2017-02-09 PROCEDURE — 85730 THROMBOPLASTIN TIME PARTIAL: CPT | Performed by: PHYSICIAN ASSISTANT

## 2017-02-09 PROCEDURE — 74011000258 HC RX REV CODE- 258: Performed by: SPECIALIST

## 2017-02-09 PROCEDURE — 71010 XR CHEST PORT: CPT

## 2017-02-09 PROCEDURE — 74011250637 HC RX REV CODE- 250/637: Performed by: NURSE PRACTITIONER

## 2017-02-09 PROCEDURE — 82962 GLUCOSE BLOOD TEST: CPT

## 2017-02-09 PROCEDURE — 77030018729 HC ELECTRD DEFIB PAD CARD -B

## 2017-02-09 PROCEDURE — 74011250637 HC RX REV CODE- 250/637: Performed by: INTERNAL MEDICINE

## 2017-02-09 PROCEDURE — 83735 ASSAY OF MAGNESIUM: CPT | Performed by: PHYSICIAN ASSISTANT

## 2017-02-09 PROCEDURE — 77030002996 HC SUT SLK J&J -A

## 2017-02-09 PROCEDURE — 74011250636 HC RX REV CODE- 250/636: Performed by: PHYSICIAN ASSISTANT

## 2017-02-09 PROCEDURE — 85347 COAGULATION TIME ACTIVATED: CPT

## 2017-02-09 PROCEDURE — 74011636637 HC RX REV CODE- 636/637: Performed by: PHYSICIAN ASSISTANT

## 2017-02-09 PROCEDURE — 74011000250 HC RX REV CODE- 250: Performed by: SPECIALIST

## 2017-02-09 PROCEDURE — 74011250636 HC RX REV CODE- 250/636: Performed by: SPECIALIST

## 2017-02-09 PROCEDURE — 4A023N8 MEASUREMENT OF CARDIAC SAMPLING AND PRESSURE, BILATERAL, PERCUTANEOUS APPROACH: ICD-10-PCS | Performed by: SPECIALIST

## 2017-02-09 PROCEDURE — 02HA3RZ INSERTION OF SHORT-TERM EXTERNAL HEART ASSIST SYSTEM INTO HEART, PERCUTANEOUS APPROACH: ICD-10-PCS | Performed by: SPECIALIST

## 2017-02-09 RX ORDER — CLOPIDOGREL 300 MG/1
600 TABLET, FILM COATED ORAL ONCE
Status: DISCONTINUED | OUTPATIENT
Start: 2017-02-09 | End: 2017-02-09 | Stop reason: HOSPADM

## 2017-02-09 RX ORDER — SODIUM CHLORIDE 0.9 % (FLUSH) 0.9 %
5-10 SYRINGE (ML) INJECTION AS NEEDED
Status: DISCONTINUED | OUTPATIENT
Start: 2017-02-09 | End: 2017-02-09

## 2017-02-09 RX ORDER — LIDOCAINE HYDROCHLORIDE 10 MG/ML
10-30 INJECTION INFILTRATION; PERINEURAL
Status: DISCONTINUED | OUTPATIENT
Start: 2017-02-09 | End: 2017-02-09

## 2017-02-09 RX ORDER — DEXTROSE MONOHYDRATE 50 MG/ML
14 INJECTION, SOLUTION INTRAVENOUS CONTINUOUS
Status: DISCONTINUED | OUTPATIENT
Start: 2017-02-09 | End: 2017-02-15

## 2017-02-09 RX ORDER — SODIUM CHLORIDE 0.9 % (FLUSH) 0.9 %
5-10 SYRINGE (ML) INJECTION AS NEEDED
Status: DISCONTINUED | OUTPATIENT
Start: 2017-02-09 | End: 2017-02-15

## 2017-02-09 RX ORDER — NITROGLYCERIN 0.4 MG/1
0.4 TABLET SUBLINGUAL
Status: DISCONTINUED | OUTPATIENT
Start: 2017-02-09 | End: 2017-02-15

## 2017-02-09 RX ORDER — SODIUM CHLORIDE 0.9 % (FLUSH) 0.9 %
5-10 SYRINGE (ML) INJECTION EVERY 8 HOURS
Status: DISCONTINUED | OUTPATIENT
Start: 2017-02-09 | End: 2017-02-09 | Stop reason: HOSPADM

## 2017-02-09 RX ORDER — CLOPIDOGREL BISULFATE 75 MG/1
75 TABLET ORAL DAILY
Status: DISCONTINUED | OUTPATIENT
Start: 2017-02-10 | End: 2017-02-22 | Stop reason: HOSPADM

## 2017-02-09 RX ORDER — SODIUM CHLORIDE 9 MG/ML
1.5 INJECTION, SOLUTION INTRAVENOUS CONTINUOUS
Status: DISCONTINUED | OUTPATIENT
Start: 2017-02-09 | End: 2017-02-09 | Stop reason: HOSPADM

## 2017-02-09 RX ORDER — HEPARIN SODIUM 1000 [USP'U]/ML
1000-5000 INJECTION, SOLUTION INTRAVENOUS; SUBCUTANEOUS
Status: DISCONTINUED | OUTPATIENT
Start: 2017-02-09 | End: 2017-02-09

## 2017-02-09 RX ORDER — HYDROCORTISONE SODIUM SUCCINATE 100 MG/2ML
100 INJECTION, POWDER, FOR SOLUTION INTRAMUSCULAR; INTRAVENOUS
Status: DISCONTINUED | OUTPATIENT
Start: 2017-02-09 | End: 2017-02-15

## 2017-02-09 RX ORDER — ATROPINE SULFATE 0.1 MG/ML
.5-1 INJECTION INTRAVENOUS AS NEEDED
Status: DISCONTINUED | OUTPATIENT
Start: 2017-02-09 | End: 2017-02-09

## 2017-02-09 RX ORDER — SODIUM CHLORIDE 9 MG/ML
3 INJECTION, SOLUTION INTRAVENOUS CONTINUOUS
Status: DISCONTINUED | OUTPATIENT
Start: 2017-02-09 | End: 2017-02-09 | Stop reason: HOSPADM

## 2017-02-09 RX ORDER — MIDAZOLAM HYDROCHLORIDE 1 MG/ML
.5-1 INJECTION, SOLUTION INTRAMUSCULAR; INTRAVENOUS
Status: DISCONTINUED | OUTPATIENT
Start: 2017-02-09 | End: 2017-02-09

## 2017-02-09 RX ORDER — HEPARIN SODIUM 200 [USP'U]/100ML
2000 INJECTION, SOLUTION INTRAVENOUS AS NEEDED
Status: DISCONTINUED | OUTPATIENT
Start: 2017-02-09 | End: 2017-02-09

## 2017-02-09 RX ORDER — FENTANYL CITRATE 50 UG/ML
25-200 INJECTION, SOLUTION INTRAMUSCULAR; INTRAVENOUS
Status: DISCONTINUED | OUTPATIENT
Start: 2017-02-09 | End: 2017-02-09

## 2017-02-09 RX ORDER — SODIUM CHLORIDE 0.9 % (FLUSH) 0.9 %
5-10 SYRINGE (ML) INJECTION EVERY 8 HOURS
Status: DISCONTINUED | OUTPATIENT
Start: 2017-02-09 | End: 2017-02-15

## 2017-02-09 RX ADMIN — Medication 10 ML: at 17:19

## 2017-02-09 RX ADMIN — BIVALIRUDIN 151.55 MG/HR: 250 INJECTION, POWDER, LYOPHILIZED, FOR SOLUTION INTRAVENOUS at 09:35

## 2017-02-09 RX ADMIN — OXYCODONE HYDROCHLORIDE AND ACETAMINOPHEN 2 TABLET: 5; 325 TABLET ORAL at 18:19

## 2017-02-09 RX ADMIN — MIDAZOLAM HYDROCHLORIDE 2 MG: 1 INJECTION, SOLUTION INTRAMUSCULAR; INTRAVENOUS at 10:14

## 2017-02-09 RX ADMIN — BUMETANIDE 2 MG: 1 TABLET ORAL at 18:09

## 2017-02-09 RX ADMIN — OXYCODONE HYDROCHLORIDE AND ACETAMINOPHEN 2 TABLET: 5; 325 TABLET ORAL at 22:01

## 2017-02-09 RX ADMIN — NITROGLYCERIN 100 MCG: 5 INJECTION, SOLUTION INTRAVENOUS at 10:29

## 2017-02-09 RX ADMIN — FENTANYL CITRATE 25 MCG: 50 INJECTION, SOLUTION INTRAMUSCULAR; INTRAVENOUS at 10:14

## 2017-02-09 RX ADMIN — OXYCODONE HYDROCHLORIDE AND ACETAMINOPHEN 2 TABLET: 5; 325 TABLET ORAL at 13:27

## 2017-02-09 RX ADMIN — BUMETANIDE 2 MG: 1 TABLET ORAL at 22:01

## 2017-02-09 RX ADMIN — MIDAZOLAM HYDROCHLORIDE 2 MG: 1 INJECTION, SOLUTION INTRAMUSCULAR; INTRAVENOUS at 09:06

## 2017-02-09 RX ADMIN — MIDAZOLAM HYDROCHLORIDE 2 MG: 1 INJECTION, SOLUTION INTRAMUSCULAR; INTRAVENOUS at 10:57

## 2017-02-09 RX ADMIN — AMIODARONE HYDROCHLORIDE 200 MG: 200 TABLET ORAL at 22:01

## 2017-02-09 RX ADMIN — BIVALIRUDIN 1.75 MG/KG/HR: 250 INJECTION, POWDER, LYOPHILIZED, FOR SOLUTION INTRAVENOUS at 14:01

## 2017-02-09 RX ADMIN — Medication 81 MG: at 14:30

## 2017-02-09 RX ADMIN — PIPERACILLIN AND TAZOBACTAM 3.38 G: 3; .375 INJECTION, POWDER, FOR SOLUTION INTRAVENOUS at 14:19

## 2017-02-09 RX ADMIN — FENTANYL CITRATE 50 MCG: 50 INJECTION, SOLUTION INTRAMUSCULAR; INTRAVENOUS at 09:06

## 2017-02-09 RX ADMIN — ATORVASTATIN CALCIUM 10 MG: 10 TABLET, FILM COATED ORAL at 14:30

## 2017-02-09 RX ADMIN — GABAPENTIN 100 MG: 100 CAPSULE ORAL at 18:09

## 2017-02-09 RX ADMIN — FENTANYL CITRATE 25 MCG: 50 INJECTION, SOLUTION INTRAMUSCULAR; INTRAVENOUS at 09:37

## 2017-02-09 RX ADMIN — AMIODARONE HYDROCHLORIDE 200 MG: 200 TABLET ORAL at 14:30

## 2017-02-09 RX ADMIN — Medication 400 MG: at 14:30

## 2017-02-09 RX ADMIN — INSULIN GLARGINE 10 UNITS: 100 INJECTION, SOLUTION SUBCUTANEOUS at 14:31

## 2017-02-09 RX ADMIN — MIDAZOLAM HYDROCHLORIDE 1 MG: 1 INJECTION, SOLUTION INTRAMUSCULAR; INTRAVENOUS at 09:37

## 2017-02-09 RX ADMIN — SODIUM CHLORIDE 3 ML/KG/HR: 900 INJECTION, SOLUTION INTRAVENOUS at 08:49

## 2017-02-09 RX ADMIN — BIVALIRUDIN 151.55 MG/HR: 250 INJECTION, POWDER, LYOPHILIZED, FOR SOLUTION INTRAVENOUS at 10:45

## 2017-02-09 RX ADMIN — LIDOCAINE HYDROCHLORIDE 10 ML: 10 INJECTION, SOLUTION INFILTRATION; PERINEURAL at 09:06

## 2017-02-09 RX ADMIN — BIVALIRUDIN 1 MG/KG/HR: 250 INJECTION, POWDER, LYOPHILIZED, FOR SOLUTION INTRAVENOUS at 17:13

## 2017-02-09 RX ADMIN — BUMETANIDE 2 MG: 1 TABLET ORAL at 14:37

## 2017-02-09 RX ADMIN — IOPAMIDOL 50 ML: 755 INJECTION, SOLUTION INTRAVENOUS at 09:55

## 2017-02-09 RX ADMIN — PIPERACILLIN AND TAZOBACTAM 3.38 G: 3; .375 INJECTION, POWDER, FOR SOLUTION INTRAVENOUS at 22:00

## 2017-02-09 RX ADMIN — OXYCODONE HYDROCHLORIDE AND ACETAMINOPHEN 2 TABLET: 5; 325 TABLET ORAL at 04:07

## 2017-02-09 RX ADMIN — MILRINONE LACTATE 0.3 MCG/KG/MIN: 200 INJECTION, SOLUTION INTRAVENOUS at 06:28

## 2017-02-09 RX ADMIN — CARVEDILOL 3.12 MG: 3.12 TABLET, FILM COATED ORAL at 18:09

## 2017-02-09 RX ADMIN — SODIUM CHLORIDE 1.5 ML/KG/HR: 900 INJECTION, SOLUTION INTRAVENOUS at 11:44

## 2017-02-09 RX ADMIN — GABAPENTIN 100 MG: 100 CAPSULE ORAL at 14:30

## 2017-02-09 RX ADMIN — MILRINONE LACTATE 0.38 MCG/KG/MIN: 200 INJECTION, SOLUTION INTRAVENOUS at 19:38

## 2017-02-09 NOTE — PROGRESS NOTES
[facility] Progress Note      NAME:  Fernanda Bhatt. :   1950   MRN:   [id number]   PCP:  None  CARD:   Dr. Green Found    Date:  2017     Fernanda Phillips is a 61 y.o. male with a who presented for further evaluation of severe CAD and systolic heart failure. Subjective:   He was initially seen at [facility] 3 years ago and told that he had heart failure with LVEF 30%. He was lost to follow up due to lack of insurance and has not been seen by a physician in the interim. He complains of progressive fatigue, NAVARRO, PND, and edema over the past year, prompting presentation to [facility]. He also complained of lower extremity bullae, weeping, and pain. He denied palpitations, presyncope, syncope, or chest pain. Past 24 hours:  Impella supported PCI to RCA and Left Main - hemodynamically stable  Denies chest pain or SOB  Impella in place with P7    Objective:     Visit Vitals    /78    Pulse 84    Temp 97 °F (36.1 °C)    Resp 21    Ht 5' 9\" (1.753 m)    Wt 190 lb 14.7 oz (86.6 kg)    SpO2 97%    BMI 28.19 kg/m2          General:  fatigued    HEENT: Normocephalic, EOMI, PERRLA, Hearing intact, trachea mid-line    Neck:  supple, no significant adenopathy, carotids upstroke normal bilaterally, no bruits    CVP:  8  cm  ( + ) HJR    Heart:  Diminished PMI    Normal S1 and S2, S3 gallop    Impella at P7 - Flow 2.7 L    Murmur: 2/6 systolic murmur    Lungs: Diminished breath sounds left lower lung    Abdomen: soft, non-tender. Bowel sounds normal. +HSM    Extremity: Mild erythema bilateral lower extremities with 1-2+ pitting edema bilaterally    Neuro: Alert and oriented to person, place, and time; normal strength and tone. Normal symmetric reflexes.  Normal coordination and gait       1901 -  0700  In: 2297 [P.O.:2520; I.V.:224]  Out: 3580 [Urine:3580]  O2 Flow Rate (L/min): 4 l/min O2 Device: Nasal cannula  Temp (24hrs), Av.6 °F (36.4 °C), Min:97 °F (36.1 °C), Max:97.8 °F (36.6 °C)          Care Plan discussed with:    Comments   Patient x    Family      RN x    Care Manager                    Consultant:          Past History:     Past Medical History   Diagnosis Date    Cardiomyopathy ([address] 2017     A. Echo (17):  EF 5-10% with severe GHK,. Mildly dil LA. Mild TR. PASP 46. No past surgical history on file. Social History   Substance Use Topics    Smoking status: Not on file    Smokeless tobacco: Not on file    Alcohol use Not on file        No family history on file. Allergies: Allergies   Allergen Reactions    Heparin (Porcine) Unknown (comments)          Data Review:     CXR:   CXR Results  (Last 48 hours)    None            Echocardiogram:   Echo Results  (Last 48 hours)               17 0000  2D ECHO LIMTED ADULT W OR WO CONTR Final result    Narrative:  [address]   [address], [address]   [phone]       Transthoracic Echocardiogram       Patient: Gaurav Perdue   MRN: [id number]   ACCT #: [de-identified]   : 1950   Age: 61 years   Gender: Male   Height: 69 in   Weight: 189.6 lb   BSA: 2.02 m squared   BP: 112 / 78 mmHg   Study date: 2017   Status: Routine   Location: [address] ACC #: [id number]       Allergies: HEPARIN (PORCINE)       Ordering Physician:  Mehul Torres. Jessica Mendez MD   Reading Group:  *[facility] Group   Technologist:  Breana Saunders   Reading Physician:  Elvin Madrid. STEVE Foxzarine:   Left ventricle: The ventricle was dilated. Systolic function was severely   reduced. Ejection fraction was estimated to be 10 %. There was severe   diffuse hypokinesis. Ventricular septum: There was moderate paradoxical motion. Right ventricle: Systolic function was mildly reduced. Left atrium: The atrium was dilated. Right atrium: The atrium was mildly dilated. Mitral valve: There was moderate regurgitation. Tricuspid valve:  There was mild to moderate regurgitation. INDICATIONS: Impelia placement       PROCEDURE: This was a routine study. The study included complete 2D   imaging, complete spectral Doppler, and color Doppler. The heart rate was   158 bpm, at the start of the study. Systolic blood pressure was 112 mmHg,   at the start of the study. Diastolic blood pressure was 78 mmHg, at the   start of the study. Image quality was suboptimal.       LEFT VENTRICLE: The ventricle was dilated. Systolic function was severely   reduced. Ejection fraction was estimated to be 10 %. There was severe   diffuse hypokinesis. VENTRICULAR SEPTUM: There was moderate paradoxical motion. RIGHT VENTRICLE: The size was normal. Systolic function was mildly   reduced. LEFT ATRIUM: The atrium was dilated. RIGHT ATRIUM: The atrium was mildly dilated. MITRAL VALVE: There was mild-moderate diffuse thickening. There was mildly   reduced leaflet separation. DOPPLER: There was moderate regurgitation. AORTIC VALVE: Not well visualized due to catheter across valve. TRICUSPID VALVE: Normal valve structure. There was normal leaflet   separation. DOPPLER: The transtricuspid velocity was within the normal   range. There was no evidence for tricuspid stenosis. There was mild to   moderate regurgitation. Pulmonary artery systolic pressure: 50 mmHg. PULMONIC VALVE: Not well visualized. AORTA: The root exhibited normal size. PERICARDIUM: There was no pericardial effusion. The pericardium was normal   in appearance.        SYSTEM MEASUREMENT TABLES       2D   Ao Diam: 3.95 cm   LA Diam: 5.03 cm   LAAs A2C: 39.04 cm2   LAAs A4C: 32.91 cm2   LAESV A-L A2C: 186.27 ml   LAESV A-L A4C: 130.25 ml   LAESV Index (A-L): 77.74 ml/m2   LAESV MOD A2C: 179.91 ml   LAESV MOD A4C: 121.13 ml   LAESV(A-L): 157.03 ml   LALs A2C: 6.94 cm   LALs A4C: 7.06 cm   %FS: 7.49 %   EDV(Teich): 173.63 ml   EF Biplane: 22.81 %   EF(Teich): 16.38 % ESV(Teich): 145.18 ml   IVSd: 1.06 cm   LVEDV MOD A2C: 249.39 ml   LVEDV MOD A4C: 157.73 ml   LVEDV MOD BP: 204.78 ml   LVEF MOD A2C: 29.37 %   LVEF MOD A4C: 14.61 %   LVESV MOD A2C: 176.14 ml   LVESV MOD A4C: 134.69 ml   LVESV MOD BP: 158.07 ml   LVIDd: 5.91 cm   LVIDs: 5.46 cm   LVLd A2C: 10.43 cm   LVLd A4C: 9.75 cm   LVLs A2C: 9.41 cm   LVLs A4C: 8.9 cm   LVPWd: 0.62 cm   SV MOD A2C: 73.25 ml   SV MOD A4C: 23.05 ml   SV(Teich): 28.45 ml   RVIDd: 4.33 cm       CW   TR Vmax: 3.07 m/s   TR maxP.62 mmHg       PW   E' Lat: 0.09 m/s   E' Sept: 0.04 m/s   E/E' Lat: 9.56   E/E' Sept: 21.11   MV A Glenn: 0.87 m/s   MV Dec [place]: 8.98 m/s2   MV DecT: 100.15 ms   MV E Glenn: 0.9 m/s   MV E/A Ratio: 1.03   MV PHT: 29.04 ms   MVA By PHT: 7.57 cm2   PV Vmax: 0.41 m/s   PV maxP.67 mmHg       Prepared and E-signed by       Ace Otero M.D. Signed 2017 15:47:55                 No results found for this visit on 17.       ECG:  EKG: ST with occasional PVC, NSIVCD, LAE    LABS:  Recent Results (from the past 24 hour(s))   GLUCOSE, POC    Collection Time: 17  4:45 PM   Result Value Ref Range    Glucose (POC) 156 (H) 65 - 100 mg/dL    Performed by [address] [place] [address], POC    Collection Time: 17  9:21 PM   Result Value Ref Range    Glucose (POC) 128 (H) 65 - 100 mg/dL    Performed by [facility]    METABOLIC PANEL, COMPREHENSIVE    Collection Time: 17  4:01 AM   Result Value Ref Range    Sodium 135 (L) 136 - 145 mmol/L    Potassium 3.7 3.5 - 5.1 mmol/L    Chloride 98 97 - 108 mmol/L    CO2 27 21 - 32 mmol/L    Anion gap 10 5 - 15 mmol/L    Glucose 109 (H) 65 - 100 mg/dL    BUN 46 (H) 6 - 20 MG/DL    Creatinine 2.05 (H) 0.70 - 1.30 MG/DL    BUN/Creatinine ratio 22 (H) 12 - 20      GFR est AA 40 (L) >60 ml/min/1.73m2    GFR est non-AA 33 (L) >60 ml/min/1.73m2    Calcium 8.7 8.5 - 10.1 MG/DL    Bilirubin, total 0.5 0.2 - 1.0 MG/DL    ALT (SGPT) 20 12 - 78 U/L    AST (SGOT) 17 15 - 37 U/L    Alk. phosphatase 155 (H) 45 - 117 U/L    Protein, total 7.3 6.4 - 8.2 g/dL    Albumin 2.9 (L) 3.5 - 5.0 g/dL    Globulin 4.4 (H) 2.0 - 4.0 g/dL    A-G Ratio 0.7 (L) 1.1 - 2.2     MAGNESIUM    Collection Time: 02/09/17  4:01 AM   Result Value Ref Range    Magnesium 2.2 1.6 - 2.4 mg/dL   CBC W/O DIFF    Collection Time: 02/09/17  4:01 AM   Result Value Ref Range    WBC 5.8 4.1 - 11.1 K/uL    RBC 4.10 4. 10 - 5.70 M/uL    HGB 9.8 (L) 12.1 - 17.0 g/dL    HCT 31.7 (L) 36.6 - 50.3 %    MCV 77.3 (L) 80.0 - 99.0 FL    MCH 23.9 (L) 26.0 - 34.0 PG    MCHC 30.9 30.0 - 36.5 g/dL    RDW 16.6 (H) 11.5 - 14.5 %    PLATELET 171 553 - 791 K/uL   PTT    Collection Time: 02/09/17  4:01 AM   Result Value Ref Range    aPTT 28.0 22.1 - 32.5 sec    aPTT, therapeutic range     58.0 - 77.0 SECS   GLUCOSE, POC    Collection Time: 02/09/17  7:56 AM   Result Value Ref Range    Glucose (POC) 106 (H) 65 - 100 mg/dL    Performed by Evans Montejo    POC ACTIVATED CLOTTING TIME    Collection Time: 02/09/17  9:42 AM   Result Value Ref Range    Activated Clotting Time (POC) 301 (H) 79 - 138 SECS   POC ACTIVATED CLOTTING TIME    Collection Time: 02/09/17 11:27 AM   Result Value Ref Range    Activated Clotting Time (POC) 363 (H) 79 - 138 SECS   GLUCOSE, POC    Collection Time: 02/09/17  1:57 PM   Result Value Ref Range    Glucose (POC) 101 (H) 65 - 100 mg/dL    Performed by Ck Sims*    PTT    Collection Time: 02/09/17  2:16 PM   Result Value Ref Range    aPTT 86.1 (H) 22.1 - 32.5 sec    aPTT, therapeutic range     58.0 - 77.0 SECS     All Micro Results     Procedure Component Value Units Date/Time    CULTURE, BLOOD, PAIRED [[id number]] Collected:  02/05/17 0322    Order Status:  Completed Specimen:  Blood Updated:  02/09/17 0504     Special Requests: NO SPECIAL REQUESTS        Culture result: NO GROWTH 4 DAYS       CULTURE, BODY FLUID Chadwick Neri [[id number]] Collected:  01/26/17 1505    Order Status:  Completed Specimen: Thoracentesis Updated:  01/30/17 1057     Special Requests: NO SPECIAL REQUESTS        GRAM STAIN OCCASIONAL  WBCS SEEN         NO ORGANISMS SEEN        Culture result: NO GROWTH 4 DAYS       CULTURE, Freddy Cruz [[id number]] Collected:  01/25/17 2134    Order Status:  Completed Specimen:  Leg Updated:  01/27/17 1440     Special Requests: NO SPECIAL REQUESTS        GRAM STAIN RARE  WBCS SEEN         NO ORGANISMS SEEN        Culture result: LIGHT  STREPTOCOCCI, BETA HEMOLYTIC GROUP C  . .. Penicillin and ampicillin are drugs of choice for treatment of beta-hemolytic streptococcal infections. Susceptibility testing of penicillins and beta-lactams approved by the FDA for treatment of beta-hemolytic streptococcal infections need not be performed routinely, because nonsusceptible isolates are extremely rare. CLSI 2012         MODERATE  BETTIE TROPICALIS           Abdominal Ultrasound 2/4/17  IMPRESSION: Distended gallbladder with gallbladder wall thickening,  pericholecystic fluid and tenderness to probe palpation during exam. Findings  are consistent with acalculus cholecystitis. Thoracentesis 1/26/17  Specimen Source   1: Pleural Fluid   CYTOLOGIC INTERPRETATION:   Scattered mesothelial cells with degenerative changes and mixed inflammatory cells   General Categorization   No cells diagnostic for malignancy   Specimen Adequacy   Satisfactory for evaluation       Cardiac MRI 1/31/17  IMPRESSION  1. Markedly dilated left ventricle with 3-D end-diastolic volume index to body  surface area of 153 mL/sq m. Mild eccentric left ventricular hypertrophy with  3-D mass index to body surface area of 85 g/sq m. Severe left ventricular  systolic dysfunction. Severe global hypokinesis with regional variation. 3-D  LVEF 10%. 2. Moderately dilated right ventricle by 3-D volumetric assessment. RV end  diastolic volume indexed to body surface area of 138 mL/sq m. Moderate to severe  right ventricular systolic dysfunction. Global hypokinesis. 3-D RVEF 25%. 3. Apical a tethered mitral valve leaflets. Mild mitral regurgitation. 4. Moderately severe 3+ tricuspid regurgitation. 5. On LGE study, the anterior wall, anteroseptal wall, anteroapical wall, and  anterior lateral wall are largely viable without significant myocardial  infarction. The entire inferior wall and inferoseptal wall are completely viable  without any infarct. The base to mid inferolateral wall demonstrate a near  transmural greater than 75% thickness infarct with minimal or no viable  myocardium in this territory. The distal inferolateral wall, apical lateral wall  does not demonstrate any infarct and are largely viable. Based on the viability  imaging, the entire LAD territory is largely viable and should recover upon  revascularization. The entire RCA territory is largely viable and should recover  upon revascularization. The LCx territory demonstrate medium-size infarct with  limited viability. 6. Large left-sided pleural effusion with passive atelectasis of the left lung. 7. Dilated branch pulmonary arteries suggest pulmonary hypertension. 8. Markedly dilated left atrium measuring 59 x 55 mm. Moderately dilated right  atrium measuring 46 x 56 mm.     Medications reviewed:    Current Facility-Administered Medications   Medication Dose Route Frequency    sodium chloride (NS) flush 5-10 mL  5-10 mL IntraVENous Q8H    sodium chloride (NS) flush 5-10 mL  5-10 mL IntraVENous PRN    hydrocortisone Sod Succ (PF) (SOLU-CORTEF) injection 100 mg  100 mg IntraVENous ONCE PRN    nitroglycerin (NITROSTAT) tablet 0.4 mg  0.4 mg SubLINGual Q5MIN PRN    [START ON 2/10/2017] clopidogrel (PLAVIX) tablet 75 mg  75 mg Oral DAILY    bivalirudin (ANGIOMAX) 250 mg in 0.9% sodium chloride (MBP/ADV) 50 mL  0.2-2.5 mg/kg/hr IntraVENous CONTINUOUS    bumetanide (BUMEX) tablet 2 mg  2 mg Oral TID    docusate sodium (COLACE) capsule 100 mg  100 mg Oral DAILY PRN    piperacillin-tazobactam (ZOSYN) 3.375 g in 0.9% sodium chloride (MBP/ADV) 100 mL  3.375 g IntraVENous Q8H    nicotine (NICODERM CQ) 21 mg/24 hr patch 1 Patch  1 Patch TransDERmal DAILY    glimepiride (AMARYL) tablet 4 mg  4 mg Oral ACB    insulin glargine (LANTUS) injection 10 Units  10 Units SubCUTAneous DAILY    gabapentin (NEURONTIN) capsule 100 mg  100 mg Oral BID    milrinone (PRIMACOR) 20 MG/100 ML D5W infusion  0.3 mcg/kg/min IntraVENous CONTINUOUS    carvedilol (COREG) tablet 3.125 mg  3.125 mg Oral BID WITH MEALS    amiodarone (CORDARONE) tablet 200 mg  200 mg Oral Q12H    bacitracin 500 unit/gram packet 1 Packet  1 Packet Topical PRN    atorvastatin (LIPITOR) tablet 10 mg  10 mg Oral DAILY    0.9% sodium chloride infusion  10 mL/hr IntraVENous CONTINUOUS    acetaminophen (TYLENOL) tablet 650 mg  650 mg Oral Q6H PRN    aspirin delayed-release tablet 81 mg  81 mg Oral DAILY    glucagon (GLUCAGEN) injection 1 mg  1 mg IntraMUSCular PRN    glucose chewable tablet 16 g  4 Tab Oral PRN    insulin lispro (HUMALOG) injection   SubCUTAneous AC&HS    magnesium oxide (MAG-OX) tablet 400 mg  400 mg Oral DAILY    0.9% sodium chloride infusion  3 mL/hr IntraVENous CONTINUOUS    sodium chloride (NS) flush 5-10 mL  5-10 mL IntraVENous Q8H    sodium chloride (NS) flush 5-10 mL  5-10 mL IntraVENous PRN    albuterol (PROVENTIL VENTOLIN) nebulizer solution 2.5 mg  2.5 mg Nebulization Q4H PRN    diphenhydrAMINE (BENADRYL) capsule 25 mg  25 mg Oral QHS PRN    oxyCODONE-acetaminophen (PERCOCET) 5-325 mg per tablet 2 Tab  2 Tab Oral Q4H PRN    ELECTROLYTE REPLACEMENT PROTOCOL  1 Each Other PRN     HIDA Scan 2/7/17  Gallbladder emptying study was performed per protocol utilizing 5. 2mCi Tc-99 M  Choletec and 1.86 mcg CCK intravenously. There is normal tracer distribution  throughout the liver. Activity is noted in the gallbladder, common bile duct,  and small bowel.  The gallbladder ejection fraction is 31% (normal greater than  or equal to 35%).    IMPRESSION: Abnormal gallbladder emptying study with an EF of only 31%.        IMPRESSION:   1. Acute on chronic systolic heart failure (ICM) - Stage D, NYHA Class IV  2. Severe native coronary artery disease - s/p PCI to RCA and Left Main with Impella support  3. Diabetes Mellitus, Hga1c 13.5  4. History of tobacco abuse  5. Cellulitis  6. CHACORTA on CKD3  7. Pulmonary HTN  8. Persistent atrial fibrillation  9. Hyponatremia  10. Left pleural effusion  11. Acalculous Cholecystitis       PLAN:   1. Increasee  IV milrinone 0.375 mcg/kg/min, Follow I/O, Replete electrolytes, Hold ACE-I due to CHACORTA on CKD. Continue low dose coreg and reduce to po bumex 2 mg bid  2. Continue ASA, clopidogrel, bivalrudin, statin, low dose BB  3. Continue Impella support at P7  4. Lantus and sliding scale insulin, accuchecks, diabetic education  5. IV cefazolin 1 gram q 8 hours for cellulitis and IV zosyn 3.375 g q 8 hours for choleycystitis  6. Smoking cessation counseling, nicotine patch         Thank you for letting me see him with you,    Dorie Alcantar MD, [facility] [address] Director  Jennifer Roman [address] [place] [address]  Office: [phone]  Fax: [phone]  24 hour VAD/HF Pager: [phone]

## 2017-02-09 NOTE — PROGRESS NOTES
[facility]   [address] [facility], [address] [place] [address], [facility]  Phone: [phone]   [phone]       Nephrology Progress Note  Barron Veloz.     1950     [id number]  Date of Admission : 1/20/2017 02/09/17    CC: Follow up for CKD/ARF      Assessment and Plan   CHACORTA   - Continue Bumex 2 mg TID today   - labs in am     CKD :  Baseline Cr unknown   Presumed to be 2/2 untreated DM, HTN    Thrombocytopenia :  -  CECILIA Ab +ve. 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     Current Medications:   Current Facility-Administered Medications   Medication Dose Route Frequency    sodium chloride (NS) flush 5-10 mL  5-10 mL IntraVENous Q8H    sodium chloride (NS) flush 5-10 mL  5-10 mL IntraVENous PRN    hydrocortisone Sod Succ (PF) (SOLU-CORTEF) injection 100 mg  100 mg IntraVENous ONCE PRN    nitroglycerin (NITROSTAT) tablet 0.4 mg  0.4 mg SubLINGual Q5MIN PRN    [START ON 2/10/2017] clopidogrel (PLAVIX) tablet 75 mg  75 mg Oral DAILY    bivalirudin (ANGIOMAX) 250 mg in 0.9% sodium chloride (MBP/ADV) 50 mL  0.2-2.5 mg/kg/hr IntraVENous CONTINUOUS    bumetanide (BUMEX) tablet 2 mg  2 mg Oral TID    docusate sodium (COLACE) capsule 100 mg  100 mg Oral DAILY PRN    piperacillin-tazobactam (ZOSYN) 3.375 g in 0.9% sodium chloride (MBP/ADV) 100 mL  3.375 g IntraVENous Q8H    nicotine (NICODERM CQ) 21 mg/24 hr patch 1 Patch  1 Patch TransDERmal DAILY    glimepiride (AMARYL) tablet 4 mg  4 mg Oral ACB    insulin glargine (LANTUS) injection 10 Units  10 Units SubCUTAneous DAILY    gabapentin (NEURONTIN) capsule 100 mg  100 mg Oral BID    milrinone (PRIMACOR) 20 MG/100 ML D5W infusion  0.3 mcg/kg/min IntraVENous CONTINUOUS    carvedilol (COREG) tablet 3.125 mg  3.125 mg Oral BID WITH MEALS    amiodarone (CORDARONE) tablet 200 mg  200 mg Oral Q12H    bacitracin 500 unit/gram packet 1 Packet  1 Packet Topical PRN    atorvastatin (LIPITOR) tablet 10 mg  10 mg Oral DAILY    0.9% sodium chloride infusion  10 mL/hr IntraVENous CONTINUOUS    acetaminophen (TYLENOL) tablet 650 mg  650 mg Oral Q6H PRN    aspirin delayed-release tablet 81 mg  81 mg Oral DAILY    glucagon (GLUCAGEN) injection 1 mg  1 mg IntraMUSCular PRN    glucose chewable tablet 16 g  4 Tab Oral PRN    insulin lispro (HUMALOG) injection   SubCUTAneous AC&HS    magnesium oxide (MAG-OX) tablet 400 mg  400 mg Oral DAILY    0.9% sodium chloride infusion  3 mL/hr IntraVENous CONTINUOUS    sodium chloride (NS) flush 5-10 mL  5-10 mL IntraVENous Q8H    sodium chloride (NS) flush 5-10 mL  5-10 mL IntraVENous PRN    albuterol (PROVENTIL VENTOLIN) nebulizer solution 2.5 mg  2.5 mg Nebulization Q4H PRN    diphenhydrAMINE (BENADRYL) capsule 25 mg  25 mg Oral QHS PRN    oxyCODONE-acetaminophen (PERCOCET) 5-325 mg per tablet 2 Tab  2 Tab Oral Q4H PRN    ELECTROLYTE REPLACEMENT PROTOCOL  1 Each Other PRN      Allergies   Allergen Reactions    Heparin (Porcine) Unknown (comments)       Objective:  Vitals:    Vitals:    02/09/17 1330 02/09/17 1400 02/09/17 1430 02/09/17 1500   BP:       Pulse: 86 86 87 84   Resp: 20 19 20 21   Temp:       SpO2: 93% 97% 96% 97%   Weight:       Height:         Intake and Output:  02/09 0701 - 02/09 1900  In: -   Out: 300 [Urine:300]  02/07 1901 - 02/09 0700  In: 2744 [P.O.:2520; I.V.:224]  Out: 3580 [Urine:3580]    Physical Examination:  General: Appears stated age  Resp:  Lungs CTA  CV:  RRR,  no murmur or rub,+ LE edema  GI:  Soft, distended, NT, + Bowel sounds, no hepatosplenomegaly  Neurologic:  Non focal  Skin:  RLE cellulitis - resolving   :  No mendoza    []    High complexity decision making was performed  []    Patient is at high-risk of decompensation with multiple organ involvement    Lab Data Personally Reviewed: I have reviewed all the pertinent labs, microbiology data and radiology studies during assessment. Recent Labs      02/09/17   0401  02/08/17   0353  02/07/17   0249   NA  135*  135*  134*   K  3.7  3.9  3.7   CL  98  97  98   CO2  27  29  30   GLU  109*  181*  96   BUN  46*  42*  45*   CREA  2.05*  1.83*  2.06*   CA  8.7  8.9  8.8   MG  2.2  2.3  2.0   ALB  2.9*  2.8*  2.7*   SGOT  17  20  23   ALT  20  21  19     Recent Labs      02/09/17   0401  02/08/17   0353  02/07/17   0249   WBC  5.8  5.4  5.2   HGB  9.8*  10.3*  9.4*   HCT  31.7*  34.3*  30.6*   PLT  161  170  164     No results found for: SDES  Lab Results   Component Value Date/Time    Culture result: NO GROWTH 4 DAYS 02/05/2017 03:22 AM    Culture result: NO GROWTH 4 DAYS 01/26/2017 03:05 PM    Culture result:  01/25/2017 09:34 PM     LIGHT  STREPTOCOCCI, BETA HEMOLYTIC GROUP C  . .. Penicillin and ampicillin are drugs of choice for treatment of beta-hemolytic streptococcal infections. Susceptibility testing of penicillins and beta-lactams approved by the FDA for treatment of beta-hemolytic streptococcal infections need not be performed routinely, because nonsusceptible isolates are extremely rare.  CLSI 2012      Culture result: MODERATE  CANDIDA TROPICALIS   01/25/2017 09:34 PM    Culture result: NO SIGNIFICANT GROWTH 01/19/2017 01:35 AM     Recent Results (from the past 24 hour(s))   GLUCOSE, POC    Collection Time: 02/08/17  4:45 PM   Result Value Ref Range    Glucose (POC) 156 (H) 65 - 100 mg/dL    Performed by [address] [place] [address], POC    Collection Time: 02/08/17  9:21 PM   Result Value Ref Range    Glucose (POC) 128 (H) 65 - 100 mg/dL    Performed by Unkown     METABOLIC PANEL, COMPREHENSIVE    Collection Time: 02/09/17  4:01 AM   Result Value Ref Range    Sodium 135 (L) 136 - 145 mmol/L    Potassium 3.7 3.5 - 5.1 mmol/L    Chloride 98 97 - 108 mmol/L    CO2 27 21 - 32 mmol/L    Anion gap 10 5 - 15 mmol/L    Glucose 109 (H) 65 - 100 mg/dL    BUN 46 (H) 6 - 20 MG/DL    Creatinine 2.05 (H) 0.70 - 1.30 MG/DL    BUN/Creatinine ratio 22 (H) 12 - 20      GFR est AA 40 (L) >60 ml/min/1.73m2    GFR est non-AA 33 (L) >60 ml/min/1.73m2    Calcium 8.7 8.5 - 10.1 MG/DL    Bilirubin, total 0.5 0.2 - 1.0 MG/DL    ALT (SGPT) 20 12 - 78 U/L    AST (SGOT) 17 15 - 37 U/L    Alk. phosphatase 155 (H) 45 - 117 U/L    Protein, total 7.3 6.4 - 8.2 g/dL    Albumin 2.9 (L) 3.5 - 5.0 g/dL    Globulin 4.4 (H) 2.0 - 4.0 g/dL    A-G Ratio 0.7 (L) 1.1 - 2.2     MAGNESIUM    Collection Time: 02/09/17  4:01 AM   Result Value Ref Range    Magnesium 2.2 1.6 - 2.4 mg/dL   CBC W/O DIFF    Collection Time: 02/09/17  4:01 AM   Result Value Ref Range    WBC 5.8 4.1 - 11.1 K/uL    RBC 4.10 4. 10 - 5.70 M/uL    HGB 9.8 (L) 12.1 - 17.0 g/dL    HCT 31.7 (L) 36.6 - 50.3 %    MCV 77.3 (L) 80.0 - 99.0 FL    MCH 23.9 (L) 26.0 - 34.0 PG    MCHC 30.9 30.0 - 36.5 g/dL    RDW 16.6 (H) 11.5 - 14.5 %    PLATELET 777 105 - 876 K/uL   PTT    Collection Time: 02/09/17  4:01 AM   Result Value Ref Range    aPTT 28.0 22.1 - 32.5 sec    aPTT, therapeutic range     58.0 - 77.0 SECS   GLUCOSE, POC    Collection Time: 02/09/17  7:56 AM   Result Value Ref Range    Glucose (POC) 106 (H) 65 - 100 mg/dL    Performed by Sudeep Patel    POC ACTIVATED CLOTTING TIME    Collection Time: 02/09/17  9:42 AM   Result Value Ref Range    Activated Clotting Time (POC) 301 (H) 79 - 138 SECS   POC ACTIVATED CLOTTING TIME    Collection Time: 02/09/17 11:27 AM   Result Value Ref Range    Activated Clotting Time (POC) 363 (H) 79 - 138 SECS   GLUCOSE, POC Collection Time: 02/09/17  1:57 PM   Result Value Ref Range    Glucose (POC) 101 (H) 65 - 100 mg/dL    Performed by Kiana Varghese*    PTT    Collection Time: 02/09/17  2:16 PM   Result Value Ref Range    aPTT 86.1 (H) 22.1 - 32.5 sec    aPTT, therapeutic range     58.0 - 77.0 SECS           I have reviewed the flowsheets. Chart and Pertinent Notes have been reviewed. No change in PMH ,family and social history from Consult note.       Billie Lott MD

## 2017-02-09 NOTE — PROGRESS NOTES
Per Anibal Mendozas of [employer] the patient's case will proceed with DDS for medical necessity disability review per [facility] 's request. Will continue to follow; patient determination will take quite some time followed by likely Medicaid spend down. Will inform the patient's wife of this information and follow up on care card application income verifications.     Ashlie Ojeda, MSW, LCSW    Clinical    Jennifer Roman [phone]

## 2017-02-09 NOTE — PROGRESS NOTES
Cardiac Cath Lab Recovery Arrival Note:      Fernanda Quintero. arrived to Cardiac Cath Lab, Recovery Area. Staff introduced to patient. Patient identifiers verified with NAME and DATE OF BIRTH. Procedure verified with patient. Consent forms reviewed and signed by patient or authorized representative and verified. Allergies verified. Patient and family oriented to department. Patient and family informed of procedure and plan of care. Questions answered with review. Patient prepped for procedure, per orders from physician, prior to arrival.    Patient on cardiac monitor, non-invasive blood pressure, SPO2 monitor. On O2@ LPM via N/C. Patient is A&Ox 4. Patient reports no pain. Patient in stretcher, in low position, with side rails up, call bell within reach, patient instructed to call if assistance as needed. Patient prep in: [address], [place] [address]. Patient family has pager #  Family in: . Prep by: CAMPOS Lambert RN

## 2017-02-09 NOTE — PROGRESS NOTES
1930: Bedside and Verbal shift change report given to Gayatri Gray RN (oncoming nurse) by Danielle Keane RN (offgoing nurse). Report included the following information SBAR, Kardex, Procedure Summary, Intake/Output, MAR and Recent Results. 7399: TRANSFER - OUT REPORT:    Verbal report given to Ama Herzog RN(name) on [employer].  being transferred to Cath Lab(unit) for ordered procedure       Report consisted of patients Situation, Background, Assessment and   Recommendations(SBAR). Information from the following report(s) SBAR, Kardex, ED Summary, Procedure Summary, Intake/Output, MAR and Recent Results was reviewed with the receiving nurse. Lines:   PICC Triple Lumen 01/25/17 Left;Brachial (Active)   Central Line Being Utilized Yes 2/8/2017  7:35 PM   Criteria for Appropriate Use Long term IV/antibiotic administration 2/8/2017  7:35 PM   Site Assessment Clean, dry, & intact 2/8/2017  7:35 PM   Phlebitis Assessment 0 2/8/2017  7:35 PM   Infiltration Assessment 0 2/8/2017  7:35 PM   Date of Last Dressing Change 02/08/17 2/8/2017  7:35 PM   Dressing Status Clean, dry, & intact 2/8/2017  7:35 PM   External Catheter Length (cm) 0 centimeters 2/3/2017  4:47 AM   Dressing Type Disk with Chlorhexadine Gluconate (CHG); Transparent 2/8/2017  7:35 PM   Action Taken Open ports on tubing capped 2/8/2017  7:35 PM   Hub Color/Line Status White;Capped 2/8/2017  7:35 PM   Positive Blood Return (Site #1) Yes 2/8/2017  7:35 PM   Hub Color/Line Status Gray;Capped 2/8/2017  7:35 PM   Positive Blood Return (Site #2) Yes 2/8/2017  7:35 PM   Hub Color/Line Status Red; Infusing 2/8/2017  7:35 PM   Positive Blood Return (Site #3) Yes 2/8/2017  7:35 PM   Alcohol Cap Used Yes 2/8/2017  7:35 PM       Peripheral IV 01/18/17 Right Forearm (Active)   Site Assessment Clean, dry, & intact 2/8/2017  7:35 PM   Phlebitis Assessment 0 2/8/2017  7:35 PM   Infiltration Assessment 0 2/8/2017  7:35 PM   Dressing Status Clean, dry, & intact 2/8/2017  7:35 PM   Dressing Type Transparent;Tape 2/8/2017  7:35 PM   Hub Color/Line Status Pink;Capped 2/8/2017  7:35 PM   Action Taken Open ports on tubing capped 2/8/2017  7:35 PM   Alcohol Cap Used Yes 2/8/2017  7:35 PM        Opportunity for questions and clarification was provided. Patient transported with:   Monitor  O2 @ 1 liters    0730: Bedside and Verbal shift change report given to [address] [place] [address] (oncoming nurse) by Damion Umaña RN (offgoing nurse). Report included the following information SBAR, Kardex, ED Summary, Procedure Summary, Intake/Output, MAR and Recent Results.

## 2017-02-09 NOTE — PROGRESS NOTES
Advanced Heart Failure ICU Note      Admit Date: 2017       Procedure:  SVO2 swan placed via RFV  Impella placed via LFA  PCI performed via RFA     Successful MIKE pRCA: 2.5x38 Xience + 2.75x12 Xience overlapping. Successful MIKE ost left main: 4.0x12 Xience post-dil with 4.5x12 NC. By Dr. Scooter English        Subjective:   Pt resting comfortably following procedure. Reports he feels better. On Milrinone at .3 mcg/kg min and Angiomax gtt. He reports his belly does not hurt any more. He denies CP or SOB.       IMPRESSION/Plan:   s/p PCI w/ Impella support - Currently at P7 level of support on Impella. On Angiomax d/t CECILIA, on ASA and Plavix per cardiology, continue milrinone. Remain on statin, low dose BB; no ACEi d/t renal dysfunction. Will plan to wean Impella tomorrow. Acute on chronic systolic heart failure (ICM) - Stage D, NYHA Class IV - remain on Milrinone at  0.3 mcg/kg/min, diurese today, continue low dose Coreg, Bumex to 2 mg TID, Strict I/Os, Replete electrolytes prn, Hold ACE-I due to CHACORTA on CKD. Daily weights      Acalculus Cholecystitis - no drain required, ALP improving, pts. Symptoms improving. continue Zosyn. HIDA showed EF 31%.      L pleural effusion - slightly improved, cont. Diuretics, monitor      CECILIA - REJI positive, platelets 945 today - close f/u NO HEPARIN     Hyponatremia - improved, monitor     Diabetes Mellitus - cont. Current treatment coverage, close monitor of BS. DTC following     Cellulitis - cont. Po abx per ID. Legs improving - abx per ID     H/o Tobacco abuse - smoking cessation, nicotine patch     CHACORTA on CKD3 - renal following - Bumex to 3 mg TID     Anemia - stable, monitor     Pulmonary HTN -           Objective:   Vitals:  Blood pressure 112/78, pulse 88, temperature 98.2 °F (36.8 °C), resp. rate 18, height 5' 9\" (1.753 m), weight 190 lb 14.7 oz (86.6 kg), SpO2 96 %.   Temp (24hrs), Av.7 °F (36.5 °C), Min:97 °F (36.1 °C), Max:98.2 °F (36.8 °C)      Hemodynamics:   CO: CO (l/min): 4.6 l/min   CI: CI (l/min/m2): 2.7 l/min/m2   CVP: CVP (mmHg): 16 mmHg (02/09/17 1800)   SVR: SVR (dyne*sec)/cm5: 1173 (dyne*sec)/cm5 (02/09/17 6244)   PAP Systolic: PAP Systolic: 59 (38/25/83 7067)   PAP Diastolic: PAP Diastolic: 27 (74/00/18 9859)   PVR:     SV02: SVO2 (%): 50 % (02/09/17 1800)   SCV02:           Oxygen Therapy:  Oxygen Therapy  O2 Sat (%): 96 % (02/09/17 1800)  Pulse via Oximetry: 87 beats per minute (02/09/17 1800)  O2 Device: Nasal cannula (02/09/17 1300)  O2 Flow Rate (L/min): 4 l/min (02/09/17 1300)  ETCO2 (mmHg): 1 mmHg (02/07/17 0821)    EKG: sinus tach    Admission Weight: Last Weight   Weight: 178 lb 2.1 oz (80.8 kg) Weight: 190 lb 14.7 oz (86.6 kg)     Intake / Output / Drain:  Current Shift: 02/09 0701 - 02/09 1900  In: 1999.2 [P.O.:480; I.V.:1519.2]  Out: 1500 [Urine:1500]  Last 24 hrs.: 02/07 1901 - 02/09 0700  In: 2784 [P.O.:2520; I.V.:224]  Out: 3580 [Urine:3580]      EXAM:    HEENT:  EOMI       CVS:  S1/S2       LUNGS:  Clear b/l ant.                   ABD:  +BS, soft, NT                  EXT:  1+ b/l LE edema, +erythema       Labs:   Lab Results   Component Value Date/Time    Glucose (POC) 116 02/09/2017 04:51 PM    Glucose (POC) 101 02/09/2017 01:57 PM    Glucose (POC) 106 02/09/2017 07:56 AM    Glucose (POC) 128 02/08/2017 09:21 PM    Glucose (POC) 156 02/08/2017 04:45 PM     Recent Labs      02/09/17   0401  02/08/17   0353  02/07/17   0249   NA  135*  135*  134*   K  3.7  3.9  3.7   CL  98  97  98   CO2  27  29  30   BUN  46*  42*  45*   CREA  2.05*  1.83*  2.06*   GLU  109*  181*  96   CA  8.7  8.9  8.8   ALB  2.9*  2.8*  2.7*   WBC  5.8  5.4  5.2   HGB  9.8*  10.3*  9.4*   HCT  31.7*  34.3*  30.6*   PLT  161  170  164       Pt seen w/ Dr. Micah Wharton    Signed By: Caleb Keating PA-C    Saw patient, agree with above  Risk of morbidity and mortality - high  Medical decision making - high complexity

## 2017-02-09 NOTE — PROGRESS NOTES
Rounded on [employer] patients and provided Anointing of the Sick at request of patient    Fr. Nicole Combs

## 2017-02-09 NOTE — PROCEDURES
PCI:  SVO2 swan placed via RFV  Impella placed via LFA  PCI performed via RFA    Successful MIKE pRCA:  2.5x38 Xience + 2.75x12 Xience overlapping. Successful MIKE ost left main: 4.0x12 Xience post-dil with 4.5x12 NC. All sheaths sewn in. Run angiomax until impella out (likely overnight). ASA/plavix, statin.

## 2017-02-09 NOTE — DIABETES MGMT
DTC Progress Note    Recommendations/ Comments: Chart reviewed for glucose management with patient now post op. Blood sugars currently in target. If appropriate, please consider changing diet to consistent carbohydrate. Chart reviewed on Fernanda Ruiz. .    Patient is a 61 y.o. male with newly diagnosed Type 2 Diabetes. A1c:   Lab Results   Component Value Date/Time    Hemoglobin A1c 13.5 01/19/2017 05:05 PM    Hemoglobin A1c 13.4 01/18/2017 11:11 PM       Recent Glucose Results:   Lab Results   Component Value Date/Time     (H) 02/09/2017 04:01 AM    GLUCPOC 101 (H) 02/09/2017 01:57 PM    GLUCPOC 106 (H) 02/09/2017 07:56 AM    GLUCPOC 128 (H) 02/08/2017 09:21 PM        Lab Results   Component Value Date/Time    Creatinine 2.05 02/09/2017 04:01 AM       Active Orders   Diet    DIET CARDIAC Regular        PO intake:   Patient Vitals for the past 72 hrs:   % Diet Eaten   02/08/17 1907 75 %   02/08/17 1300 100 %   02/08/17 1043 100 %   02/07/17 1400 100 %   02/07/17 0900 0 %       Current hospital DM medication: Lantus 10 units, Amaryl 4 mg daily and Humalog for correction, high sensitivity    Will continue to follow as needed.     Thank you  Rashida Jacques MS, RN, CDE

## 2017-02-09 NOTE — PROGRESS NOTES
0730: Received report from [facility], pt already down in cath lab at this time. 1038: Pt to CVI after procedure.

## 2017-02-09 NOTE — ROUTINE PROCESS
TRANSFER - OUT REPORT:    Verbal report given to WILLIAM Zayas(name) on [employer].  being transferred to CVICU(unit) for routine post - op       Report consisted of patients Situation, Background, Assessment and   Recommendations(SBAR). Information from the following report(s) SBAR, Kardex, Procedure Summary, MAR and Cardiac Rhythm NSR was reviewed with the receiving nurse. Lines:   PICC Triple Lumen 01/25/17 Left;Brachial (Active)   Central Line Being Utilized Yes 2/8/2017  7:35 PM   Criteria for Appropriate Use Long term IV/antibiotic administration 2/8/2017  7:35 PM   Site Assessment Clean, dry, & intact 2/8/2017  7:35 PM   Phlebitis Assessment 0 2/8/2017  7:35 PM   Infiltration Assessment 0 2/8/2017  7:35 PM   Date of Last Dressing Change 02/08/17 2/8/2017  7:35 PM   Dressing Status Clean, dry, & intact 2/8/2017  7:35 PM   External Catheter Length (cm) 0 centimeters 2/3/2017  4:47 AM   Dressing Type Disk with Chlorhexadine Gluconate (CHG); Transparent 2/8/2017  7:35 PM   Action Taken Open ports on tubing capped 2/8/2017  7:35 PM   Hub Color/Line Status White;Capped 2/8/2017  7:35 PM   Positive Blood Return (Site #1) Yes 2/8/2017  7:35 PM   Hub Color/Line Status Gray;Capped 2/8/2017  7:35 PM   Positive Blood Return (Site #2) Yes 2/8/2017  7:35 PM   Hub Color/Line Status Red; Infusing 2/8/2017  7:35 PM   Positive Blood Return (Site #3) Yes 2/8/2017  7:35 PM   Alcohol Cap Used Yes 2/8/2017  7:35 PM       Peripheral IV 01/18/17 Right Forearm (Active)   Site Assessment Clean, dry, & intact 2/8/2017  7:35 PM   Phlebitis Assessment 0 2/8/2017  7:35 PM   Infiltration Assessment 0 2/8/2017  7:35 PM   Dressing Status Clean, dry, & intact 2/8/2017  7:35 PM   Dressing Type Transparent;Tape 2/8/2017  7:35 PM   Hub Color/Line Status Pink;Capped 2/8/2017  7:35 PM   Action Taken Open ports on tubing capped 2/8/2017  7:35 PM   Alcohol Cap Used Yes 2/8/2017  7:35 PM        Opportunity for questions and clarification was provided.       Patient transported with:   O2 @ 6 liters  Registered Nurse  [place]

## 2017-02-09 NOTE — PROGRESS NOTES
Cardiac Cath Lab Procedure Area Arrival Note:    Fernanda Palm. arrived to Cardiac Cath Lab, Procedure Area. Patient identifiers verified with NAME and DATE OF BIRTH. Procedure verified with patient. Consent forms verified. Allergies verified. Patient informed of procedure and plan of care. Questions answered with review. Patient voiced understanding of procedure and plan of care. Patient on cardiac monitor, non-invasive blood pressure, SPO2 monitor. On O2 @ 2 lpm via nasal cannula. IV of normal saline on pump at 10 ml/hr. Patient status doing well without problems. Patient is A&Ox 4. Patient reports no pain. Patient medicated during procedure with orders obtained and verified by Dr. Mag Carson. Refer to patients Cardiac Cath Lab PROCEDURE REPORT for vital signs, assessment, status, and response during procedure, printed at end of case. Printed report on chart or scanned into chart.

## 2017-02-09 NOTE — PROGRESS NOTES
Post-surgery follow up visit. Edwar was in very good spirits. Offered affirmation of juan and assurance of prayer. Spoke with wife Fly Chávez about recent health-related events. Will visit as able. [phone] Staff  (Greg Alcantara Patient Care Specialist)   Paging Service [address] [place] [phone])

## 2017-02-10 ENCOUNTER — ANESTHESIA EVENT (OUTPATIENT)
Dept: ENDOSCOPY | Age: 67
DRG: 215 | End: 2017-02-10
Payer: SELF-PAY

## 2017-02-10 ENCOUNTER — ANESTHESIA (OUTPATIENT)
Dept: ENDOSCOPY | Age: 67
DRG: 215 | End: 2017-02-10
Payer: SELF-PAY

## 2017-02-10 ENCOUNTER — APPOINTMENT (OUTPATIENT)
Dept: GENERAL RADIOLOGY | Age: 67
DRG: 215 | End: 2017-02-10
Attending: PHYSICIAN ASSISTANT
Payer: SELF-PAY

## 2017-02-10 LAB
ALBUMIN SERPL BCP-MCNC: 2.8 G/DL (ref 3.5–5)
ALBUMIN/GLOB SERPL: 0.7 {RATIO} (ref 1.1–2.2)
ALP SERPL-CCNC: 139 U/L (ref 45–117)
ALT SERPL-CCNC: 19 U/L (ref 12–78)
ANION GAP BLD CALC-SCNC: 9 MMOL/L (ref 5–15)
APTT PPP: 46.2 SEC (ref 22.1–32.5)
APTT PPP: 66.4 SEC (ref 22.1–32.5)
APTT PPP: 72.2 SEC (ref 22.1–32.5)
APTT PPP: 77.6 SEC (ref 22.1–32.5)
APTT PPP: 79.8 SEC (ref 22.1–32.5)
ARTERIAL PATENCY WRIST A: ABNORMAL
AST SERPL W P-5'-P-CCNC: 30 U/L (ref 15–37)
ATRIAL RATE: 85 BPM
BACTERIA SPEC CULT: NORMAL
BASE EXCESS BLD CALC-SCNC: 1 MMOL/L
BDY SITE: ABNORMAL
BILIRUB SERPL-MCNC: 0.6 MG/DL (ref 0.2–1)
BUN SERPL-MCNC: 39 MG/DL (ref 6–20)
BUN/CREAT SERPL: 22 (ref 12–20)
CALCIUM SERPL-MCNC: 8.6 MG/DL (ref 8.5–10.1)
CALCULATED P AXIS, ECG09: 50 DEGREES
CALCULATED R AXIS, ECG10: 7 DEGREES
CALCULATED T AXIS, ECG11: 140 DEGREES
CHLORIDE SERPL-SCNC: 99 MMOL/L (ref 97–108)
CO2 SERPL-SCNC: 27 MMOL/L (ref 21–32)
CREAT SERPL-MCNC: 1.78 MG/DL (ref 0.7–1.3)
DIAGNOSIS, 93000: NORMAL
ERYTHROCYTE [DISTWIDTH] IN BLOOD BY AUTOMATED COUNT: 16.8 % (ref 11.5–14.5)
GAS FLOW.O2 O2 DELIVERY SYS: ABNORMAL L/MIN
GAS FLOW.O2 SETTING OXYMISER: 4 L/M
GLOBULIN SER CALC-MCNC: 3.9 G/DL (ref 2–4)
GLUCOSE BLD STRIP.AUTO-MCNC: 116 MG/DL (ref 65–100)
GLUCOSE BLD STRIP.AUTO-MCNC: 141 MG/DL (ref 65–100)
GLUCOSE BLD STRIP.AUTO-MCNC: 83 MG/DL (ref 65–100)
GLUCOSE BLD STRIP.AUTO-MCNC: 88 MG/DL (ref 65–100)
GLUCOSE SERPL-MCNC: 86 MG/DL (ref 65–100)
HCO3 BLD-SCNC: 25.1 MMOL/L (ref 22–26)
HCT VFR BLD AUTO: 29.8 % (ref 36.6–50.3)
HCT VFR BLD AUTO: 30 % (ref 36.6–50.3)
HCT VFR BLD AUTO: 31 % (ref 36.6–50.3)
HGB BLD-MCNC: 9.2 G/DL (ref 12.1–17)
HGB BLD-MCNC: 9.2 G/DL (ref 12.1–17)
HGB BLD-MCNC: 9.6 G/DL (ref 12.1–17)
LDH SERPL L TO P-CCNC: 366 U/L (ref 85–241)
LDH SERPL L TO P-CCNC: 391 U/L (ref 85–241)
LDH SERPL L TO P-CCNC: 433 U/L (ref 85–241)
MAGNESIUM SERPL-MCNC: 2.3 MG/DL (ref 1.6–2.4)
MCH RBC QN AUTO: 23.9 PG (ref 26–34)
MCHC RBC AUTO-ENTMCNC: 30.7 G/DL (ref 30–36.5)
MCV RBC AUTO: 77.9 FL (ref 80–99)
O2/TOTAL GAS SETTING VFR VENT: 36 %
P-R INTERVAL, ECG05: 190 MS
PCO2 BLD: 39 MMHG (ref 35–45)
PH BLD: 7.42 [PH] (ref 7.35–7.45)
PLATELET # BLD AUTO: 155 K/UL (ref 150–400)
PO2 BLD: 25 MMHG (ref 80–100)
POTASSIUM SERPL-SCNC: 3.6 MMOL/L (ref 3.5–5.1)
PROT SERPL-MCNC: 6.7 G/DL (ref 6.4–8.2)
Q-T INTERVAL, ECG07: 414 MS
QRS DURATION, ECG06: 128 MS
QTC CALCULATION (BEZET), ECG08: 492 MS
RBC # BLD AUTO: 3.85 M/UL (ref 4.1–5.7)
SAO2 % BLD: 47 % (ref 92–97)
SERVICE CMNT-IMP: ABNORMAL
SERVICE CMNT-IMP: ABNORMAL
SERVICE CMNT-IMP: NORMAL
SODIUM SERPL-SCNC: 135 MMOL/L (ref 136–145)
SPECIMEN TYPE: ABNORMAL
THERAPEUTIC RANGE,PTTT: ABNORMAL SECS (ref 58–77)
TOTAL RESP. RATE, ITRR: 15
VENTRICULAR RATE, ECG03: 85 BPM
WBC # BLD AUTO: 5.9 K/UL (ref 4.1–11.1)

## 2017-02-10 PROCEDURE — 93306 TTE W/DOPPLER COMPLETE: CPT

## 2017-02-10 PROCEDURE — 74011250637 HC RX REV CODE- 250/637: Performed by: NURSE PRACTITIONER

## 2017-02-10 PROCEDURE — 77030011256 HC DRSG MEPILEX <16IN NO BORD MOLN -A

## 2017-02-10 PROCEDURE — 83735 ASSAY OF MAGNESIUM: CPT | Performed by: PHYSICIAN ASSISTANT

## 2017-02-10 PROCEDURE — 77010033678 HC OXYGEN DAILY

## 2017-02-10 PROCEDURE — 74011250636 HC RX REV CODE- 250/636: Performed by: NURSE PRACTITIONER

## 2017-02-10 PROCEDURE — 85730 THROMBOPLASTIN TIME PARTIAL: CPT | Performed by: THORACIC SURGERY (CARDIOTHORACIC VASCULAR SURGERY)

## 2017-02-10 PROCEDURE — 71010 XR CHEST PORT: CPT

## 2017-02-10 PROCEDURE — 82803 BLOOD GASES ANY COMBINATION: CPT

## 2017-02-10 PROCEDURE — 74011250637 HC RX REV CODE- 250/637: Performed by: PHYSICIAN ASSISTANT

## 2017-02-10 PROCEDURE — 65610000003 HC RM ICU SURGICAL

## 2017-02-10 PROCEDURE — 85018 HEMOGLOBIN: CPT | Performed by: PHYSICIAN ASSISTANT

## 2017-02-10 PROCEDURE — 85027 COMPLETE CBC AUTOMATED: CPT | Performed by: PHYSICIAN ASSISTANT

## 2017-02-10 PROCEDURE — 74011250636 HC RX REV CODE- 250/636: Performed by: PHYSICIAN ASSISTANT

## 2017-02-10 PROCEDURE — 74011250636 HC RX REV CODE- 250/636: Performed by: THORACIC SURGERY (CARDIOTHORACIC VASCULAR SURGERY)

## 2017-02-10 PROCEDURE — 74011250636 HC RX REV CODE- 250/636

## 2017-02-10 PROCEDURE — 85730 THROMBOPLASTIN TIME PARTIAL: CPT | Performed by: PHYSICIAN ASSISTANT

## 2017-02-10 PROCEDURE — 74011000258 HC RX REV CODE- 258: Performed by: PHYSICIAN ASSISTANT

## 2017-02-10 PROCEDURE — 80053 COMPREHEN METABOLIC PANEL: CPT | Performed by: PHYSICIAN ASSISTANT

## 2017-02-10 PROCEDURE — 74011250637 HC RX REV CODE- 250/637: Performed by: SPECIALIST

## 2017-02-10 PROCEDURE — 36415 COLL VENOUS BLD VENIPUNCTURE: CPT | Performed by: PHYSICIAN ASSISTANT

## 2017-02-10 PROCEDURE — 82962 GLUCOSE BLOOD TEST: CPT

## 2017-02-10 PROCEDURE — 83615 LACTATE (LD) (LDH) ENZYME: CPT | Performed by: PHYSICIAN ASSISTANT

## 2017-02-10 PROCEDURE — 74011250637 HC RX REV CODE- 250/637: Performed by: THORACIC SURGERY (CARDIOTHORACIC VASCULAR SURGERY)

## 2017-02-10 PROCEDURE — 74011250636 HC RX REV CODE- 250/636: Performed by: INTERNAL MEDICINE

## 2017-02-10 PROCEDURE — 74011250637 HC RX REV CODE- 250/637: Performed by: INTERNAL MEDICINE

## 2017-02-10 PROCEDURE — 74011636637 HC RX REV CODE- 636/637: Performed by: PHYSICIAN ASSISTANT

## 2017-02-10 RX ORDER — FACIAL-BODY WIPES
10 EACH TOPICAL DAILY PRN
Status: DISCONTINUED | OUTPATIENT
Start: 2017-02-10 | End: 2017-02-22 | Stop reason: HOSPADM

## 2017-02-10 RX ORDER — POTASSIUM CHLORIDE 29.8 MG/ML
20 INJECTION INTRAVENOUS
Status: COMPLETED | OUTPATIENT
Start: 2017-02-10 | End: 2017-02-10

## 2017-02-10 RX ORDER — MORPHINE SULFATE 2 MG/ML
1-2 INJECTION, SOLUTION INTRAMUSCULAR; INTRAVENOUS
Status: ACTIVE | OUTPATIENT
Start: 2017-02-10 | End: 2017-02-13

## 2017-02-10 RX ORDER — ADHESIVE BANDAGE
30 BANDAGE TOPICAL DAILY PRN
Status: DISCONTINUED | OUTPATIENT
Start: 2017-02-10 | End: 2017-02-22 | Stop reason: HOSPADM

## 2017-02-10 RX ADMIN — INSULIN GLARGINE 10 UNITS: 100 INJECTION, SOLUTION SUBCUTANEOUS at 09:11

## 2017-02-10 RX ADMIN — MILRINONE LACTATE 0.38 MCG/KG/MIN: 200 INJECTION, SOLUTION INTRAVENOUS at 18:41

## 2017-02-10 RX ADMIN — Medication 10 ML: at 22:11

## 2017-02-10 RX ADMIN — OXYCODONE HYDROCHLORIDE AND ACETAMINOPHEN 2 TABLET: 5; 325 TABLET ORAL at 07:45

## 2017-02-10 RX ADMIN — BUMETANIDE 2 MG: 1 TABLET ORAL at 22:19

## 2017-02-10 RX ADMIN — AMIODARONE HYDROCHLORIDE 200 MG: 200 TABLET ORAL at 22:19

## 2017-02-10 RX ADMIN — ATORVASTATIN CALCIUM 10 MG: 10 TABLET, FILM COATED ORAL at 09:12

## 2017-02-10 RX ADMIN — Medication 81 MG: at 09:12

## 2017-02-10 RX ADMIN — GABAPENTIN 100 MG: 100 CAPSULE ORAL at 18:42

## 2017-02-10 RX ADMIN — BIVALIRUDIN 0.38 MG/KG/HR: 250 INJECTION, POWDER, LYOPHILIZED, FOR SOLUTION INTRAVENOUS at 18:41

## 2017-02-10 RX ADMIN — BUMETANIDE 2 MG: 1 TABLET ORAL at 09:12

## 2017-02-10 RX ADMIN — BIVALIRUDIN 0.38 MG/KG/HR: 250 INJECTION, POWDER, LYOPHILIZED, FOR SOLUTION INTRAVENOUS at 10:36

## 2017-02-10 RX ADMIN — CLOPIDOGREL BISULFATE 75 MG: 75 TABLET, FILM COATED ORAL at 09:12

## 2017-02-10 RX ADMIN — Medication 10 ML: at 06:05

## 2017-02-10 RX ADMIN — BISACODYL 10 MG: 10 SUPPOSITORY RECTAL at 16:31

## 2017-02-10 RX ADMIN — OXYCODONE HYDROCHLORIDE AND ACETAMINOPHEN 2 TABLET: 5; 325 TABLET ORAL at 15:48

## 2017-02-10 RX ADMIN — OXYCODONE HYDROCHLORIDE AND ACETAMINOPHEN 2 TABLET: 5; 325 TABLET ORAL at 11:35

## 2017-02-10 RX ADMIN — POTASSIUM CHLORIDE 20 MEQ: 400 INJECTION, SOLUTION INTRAVENOUS at 07:48

## 2017-02-10 RX ADMIN — BUMETANIDE 2 MG: 1 TABLET ORAL at 15:48

## 2017-02-10 RX ADMIN — BIVALIRUDIN 0.5 MG/KG/HR: 250 INJECTION, POWDER, LYOPHILIZED, FOR SOLUTION INTRAVENOUS at 03:12

## 2017-02-10 RX ADMIN — OXYCODONE HYDROCHLORIDE AND ACETAMINOPHEN 2 TABLET: 5; 325 TABLET ORAL at 03:21

## 2017-02-10 RX ADMIN — SODIUM CHLORIDE 9 ML/HR: 900 INJECTION, SOLUTION INTRAVENOUS at 21:00

## 2017-02-10 RX ADMIN — CARVEDILOL 3.12 MG: 3.12 TABLET, FILM COATED ORAL at 16:31

## 2017-02-10 RX ADMIN — AMIODARONE HYDROCHLORIDE 200 MG: 200 TABLET ORAL at 09:12

## 2017-02-10 RX ADMIN — SODIUM CHLORIDE 3 ML/HR: 900 INJECTION, SOLUTION INTRAVENOUS at 14:38

## 2017-02-10 RX ADMIN — OXYCODONE HYDROCHLORIDE AND ACETAMINOPHEN 2 TABLET: 5; 325 TABLET ORAL at 19:27

## 2017-02-10 RX ADMIN — POTASSIUM CHLORIDE 20 MEQ: 400 INJECTION, SOLUTION INTRAVENOUS at 09:12

## 2017-02-10 RX ADMIN — CARVEDILOL 3.12 MG: 3.12 TABLET, FILM COATED ORAL at 09:12

## 2017-02-10 RX ADMIN — PIPERACILLIN AND TAZOBACTAM 3.38 G: 3; .375 INJECTION, POWDER, FOR SOLUTION INTRAVENOUS at 14:30

## 2017-02-10 RX ADMIN — GABAPENTIN 100 MG: 100 CAPSULE ORAL at 09:12

## 2017-02-10 RX ADMIN — SODIUM CHLORIDE 10 ML/HR: 900 INJECTION, SOLUTION INTRAVENOUS at 06:06

## 2017-02-10 RX ADMIN — Medication 10 ML: at 15:48

## 2017-02-10 RX ADMIN — GLIMEPIRIDE 4 MG: 2 TABLET ORAL at 09:12

## 2017-02-10 RX ADMIN — BIVALIRUDIN 0.45 MG/KG/HR: 250 INJECTION, POWDER, LYOPHILIZED, FOR SOLUTION INTRAVENOUS at 23:52

## 2017-02-10 RX ADMIN — PIPERACILLIN AND TAZOBACTAM 3.38 G: 3; .375 INJECTION, POWDER, FOR SOLUTION INTRAVENOUS at 22:19

## 2017-02-10 RX ADMIN — PIPERACILLIN AND TAZOBACTAM 3.38 G: 3; .375 INJECTION, POWDER, FOR SOLUTION INTRAVENOUS at 06:04

## 2017-02-10 RX ADMIN — Medication 400 MG: at 09:12

## 2017-02-10 NOTE — PROGRESS NOTES
0800 - Report received from [place], [address] [facility] Flow reduced to P-5, discussed hemodynamics. No titration parameters received, leave flow at p-5 for now. 1000 - Dr. Corea, Dr. Myra Hebert and Daniel Matutema at bedside. Flow reduced to P-4,order received for bedside echo and will receive further weaning orders after echo. Echo tech called to notify test is needed ASAP.  1300 - Dr. Corea at bedside, flow turned down to P-3.  1350 - Echo tech here for bedside echo. 1440 - Echo complete. Flow increased to P-6 by Dr Corea. 1600 - Pt resistant to turning, states it gives him back pain. Encouraged pt to turn and used pillows to support entire body to keep his spine straight, he states this is ok. 1630 - Pt expressing frustration with constipation. Suppository given. 1830 - Pt has been successful in having BM.

## 2017-02-10 NOTE — PROGRESS NOTES
Bedside and Verbal shift change report given to [address] [place] [address] [address] (oncoming nurse) by Dinorah Serrano (offgoing nurse). Report included the following information SBAR, Kardex, MAR and Recent Results.

## 2017-02-10 NOTE — PROGRESS NOTES
Problem: Pressure Ulcer - Risk of  Goal: *Prevention of pressure ulcer  Outcome: Progressing Towards Goal  Understands need to turn Q2H    Problem: Heart Failure: Discharge Outcomes  Goal: *Verbalizes understanding and describes prescribed diet  Outcome: Progressing Towards Goal  Patient practices and understands need to minimize NA intake

## 2017-02-10 NOTE — PROGRESS NOTES
1500 - 15:35    S/P high risk PCI for  of RCA and left main stent with Impella support. Cardiac index is improved and SvO2 looks better. Weaned Impella down to P3 over the course of the day. Unfortunately had ECHO performed this afternoon which still showed an EF in the 5-10% range. Will hold off on pulling the Impella for now. Will plan on GETA and groin cutdown for Impella removal sometime next week. Patient concerned about having a BM with the Impella in. Will try to work with him on this. Increased his Impella back up to P6 this afternoon. 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, stage D); ECHO today showed EF 5-10%; moderate to severe failure;  Impella at P6; milrinone at 0.375; PO Bumex 2 mg TID; will leave on Impella support over the weekend to rest the heart; cardiac index 2.5, SvO2 50's; PA 40/20's    Pulmonary - severe COPD (FEV-1 51% predicted); pulmonary HTN; no acute problems    Renal - acute on chronic renal insufficiency (stage III CKD)    Intake/Output Summary (Last 24 hours) at 02/10/17 1520  Last data filed at 02/10/17 1500   Gross per 24 hour   Intake          3736.87 ml   Output             5000 ml   Net         -1263.13 ml     Has been negative 800 cc over the course of the day    GI - acalculous cholecystitis / biliary akinesia; on Zosyn for now; alkaline phosphatase down to 139; abdominal pain much less today    Heme - + CECILIA; Hgb 9.2, platelets 402; on bivalirudin for Impella; ASA / plavix for stents    ID - Zosyn for acalculous cholecystitis; WBC 5.9; lower extremity cellulitis much improved    F/E - hyponatremia (Na 134) likely due to diuresis; hypokalemia (K 3.6) on replacement protocol    Nutrition - tolerating a normal diet    Endocrine - DM; DTC following       Total critical care time - 35 minutes

## 2017-02-10 NOTE — PROGRESS NOTES
[facility] Progress Note      NAME:  Fernanda Funk. :   1950   MRN:   [id number]   PCP:  None  CARD:   Dr. Hannah Real    Date:  February 10, 2017     Fernanda Funk. is a 61 y.o. male with a who presented for further evaluation of severe CAD and systolic heart failure. Subjective:   He was initially seen at [address] [place] [address] 3 years ago and told that he had heart failure with LVEF 30%. He was lost to follow up due to lack of insurance and has not been seen by a physician in the interim. He complains of progressive fatigue, NAVARRO, PND, and edema over the past year, prompting presentation to [facility]. He also complained of lower extremity bullae, weeping, and pain. He denied palpitations, presyncope, syncope, or chest pain. Past 24 hours:  Impella supported PCI to  of RCA and Left Main - hemodynamically stable  Impella in place at P5  Weaned to P3 - bedside echo without any improvement in LVEF    Pt complains of constipation and difficulty having a BM while lying flat  Also complains of lower back discomfort with turning    Objective:     Visit Vitals    /78    Pulse 88    Temp 98.4 °F (36.9 °C)    Resp 21    Ht 5' 9\" (1.753 m)    Wt 83.4 kg (183 lb 13.8 oz)    SpO2 98%    BMI 27.15 kg/m2      Hemodynamics:  Cardiac Index 3.4 L/min/m2  PAP 51/20 31 mmHg  SvO2 52%  SVR 1099 dynes*sec/cm5    General:  fatigued    HEENT: Normocephalic, EOMI, PERRLA, Hearing intact, trachea mid-line    Neck:  supple, no significant adenopathy, carotids upstroke normal bilaterally, no bruits    CVP:  8 cm  ( + ) HJR    Heart:  Diminished PMI    Normal S1 and S2, S3 gallop    Impella at P7 - Flow 2.7 L    Murmur: 2/6 systolic murmur    Lungs: Diminished breath sounds left lower lung    Abdomen: soft, non-tender.  Bowel sounds normal. +HSM    Extremity: Mild erythema bilateral lower extremities with 1-2+ pitting edema bilaterally    Neuro: Alert and oriented to person, place, and time; normal strength and tone. Normal symmetric reflexes. Normal coordination and gait       1901 - 02/10 0700  In: 3692.5 [P.O.:1680; I.V.:2012.5]  Out: 5475 [Urine:5475]  O2 Flow Rate (L/min): 4 l/min O2 Device: Nasal cannula  Temp (24hrs), Av.1 °F (36.7 °C), Min:97.6 °F (36.4 °C), Max:98.5 °F (36.9 °C)          Care Plan discussed with:    Comments   Patient x    Family      RN x    Care Manager                    Consultant:          Past History:     Past Medical History   Diagnosis Date    Cardiomyopathy ([address] 2017     A. Echo (17):  EF 5-10% with severe GHK,. Mildly dil LA. Mild TR. PASP 46. No past surgical history on file. Social History   Substance Use Topics    Smoking status: Not on file    Smokeless tobacco: Not on file    Alcohol use Not on file        No family history on file. Allergies: Allergies   Allergen Reactions    Heparin (Porcine) Unknown (comments)          Data Review:     CXR:   CXR Results  (Last 48 hours)               02/10/17 0429  XR CHEST PORT Final result    Impression:  IMPRESSION:    Unchanged life support lines and tubes including a an patella device. Slight   increase in left-sided pleural effusion may be accentuated by positional   differences. Narrative:  INDICATION:    impella, CHF,       EXAMINATION:  AP CHEST, PORTABLE       COMPARISON: 2017       FINDINGS: Single AP portable view of the chest at 0353 hours demonstrates no   change in position of the lines and tubes. The cardiomediastinal silhouette is   stable. Slight increase in left pleural effusion though this could be due to or   accentuated by change in positioning of the patient. No evidence of   pneumothorax. 17 1827  XR CHEST PORT Final result    Impression:  IMPRESSION:    1. Cardiomegaly. 2. Left-sided airspace disease. 3. Impella device in expected position. 4. Femoral Carbon-Val catheter with tip over the right pulmonary artery. Narrative:  EXAM:  XR CHEST PORT. INDICATION: Status post PCI within Impella support. COMPARISON: 2017. FINDINGS:    A portable AP radiograph of the chest was obtained at 1823 hours. Lines and tubes: The patient is on a cardiac monitor. There is a left arm PICC   line with tip over the superior vena cava which is unchanged. There is a new   intrapelvic device overlying the left ventricle and aortic outflow tract. There   is a Newcomb-Val catheter in the inferior vena cava with tip in the right   pulmonary artery. Lungs: There is left-sided airspace disease. Pleura: There is no pneumothorax or pleural effusion. Mediastinum: The cardiac silhouette is enlarged and the aorta is   atherosclerotic. Bones and soft tissues: The bones and soft tissues are grossly within normal   limits. Echocardiogram:   Echo Results  (Last 48 hours)               02/10/17 1349  2D ECHO COMPLETE ADULT (TTE) W OR WO CONTR Final result    Narrative:  Darryl Thakur [address]   [address], [address]   [phone]       Transthoracic Echocardiogram       Patient: Erlin Lua   MRN: [id number]   ACCT #: [de-identified]   : 1950   Age: 61 years   Gender: Male   Height: 69 in   Weight: 189.6 lb   BSA: 2.02 m squared   BP: 112 / 78 mmHg   Study date: 10-Feb-2017   Status: Routine   Location: [address] ACC #: [id number]       Allergies: HEPARIN (PORCINE)       Ordering Physician:  Lauren Alexander. Roberta Sanchez MD   Reading Group:  *CAV Group   Technologist:  Stephen Preston   Reading Physician:  Shivani Gutiérrez MD       SUMMARY:   Left ventricle: What appears to be an Impella catheter is seen crossing   the aortic valve and ending in the LV cavity with continous flow by   Doppler out the LV outflow tract. The ventricle was moderately dilated. Systolic function was severely reduced. Ejection fraction was estimated to   be 10 %. There was severe diffuse hypokinesis. Left atrium:  The atrium was moderately dilated. Mitral valve: There was moderate to severe regurgitation. Tricuspid valve: There was mild to moderate regurgitation with mildly   increased velocity suggesting RV systolic pressure about 78UOA above RA. INDICATIONS: Re eval impelia       PROCEDURE: This was a routine study. The study included complete 2D   imaging, complete spectral Doppler, and color Doppler. The heart rate was   81 bpm, at the start of the study. Systolic blood pressure was 112 mmHg,   at the start of the study. Diastolic blood pressure was 78 mmHg, at the   start of the study. LEFT VENTRICLE: What appears to be an Impella catheter is seen crossing   the aortic valve and ending in the LV cavity with continous flow by   Doppler out the LV outflow tract. The ventricle was moderately dilated. Systolic function was severely reduced. Ejection fraction was estimated to   be 10 %. There was severe diffuse hypokinesis. Wall thickness was normal.       RIGHT VENTRICLE: The size was normal. Systolic function was normal. Wall   thickness was normal. A catheter was present. LEFT ATRIUM: The atrium was moderately dilated. RIGHT ATRIUM: Size was normal. A catheter was present. MITRAL VALVE: There was mild thickening. DOPPLER: There was moderate to   severe regurgitation. AORTIC VALVE: Not well visualized. TRICUSPID VALVE: Normal valve structure. DOPPLER: There was mild to   moderate regurgitation with mildly increased velocity suggesting RV   systolic pressure about 36ETT above RA. PULMONIC VALVE: Not well visualized. AORTA: The root exhibited normal size. PERICARDIUM: There was no pericardial effusion. The pericardium was normal   in appearance.        MEASUREMENT TABLES       M-mode measurements   Left atrium   (Reference normals)   AP dim   46 mm   (19-40)       SYSTEM MEASUREMENT TABLES       2D   Ao Diam: 3.94 cm   LA Diam: 4.6 cm   LAAs A2C: 38.06 cm2 LAAs A4C: 38.34 cm2   LAESV A-L A2C: 169.29 ml   LAESV A-L A4C: 173.45 ml   LAESV Index (A-L): 85.23 ml/m2   LAESV MOD A2C: 164.13 ml   LAESV MOD A4C: 158.79 ml   LAESV(A-L): 172.16 ml   LALs A2C: 7.26 cm   LALs A4C: 7.19 cm   %FS: 10.06 %   EDV(Teich): 212.15 ml   EF(Teich): 21.49 %   ESV(Teich): 166.56 ml   IVSd: 0.81 cm   LVIDd: 6.45 cm   LVIDs: 5.8 cm   LVPWd: 0.69 cm   SV(Teich): 45.59 ml   RA Diam: 6.09 cm   RVIDd: 4.12 cm       CW   AV Vmax: 0.86 m/s   AV maxP.96 mmHg   TR Vmax: 3.09 m/s   TR maxP.2 mmHg       PW   E' Lat: 0.09 m/s   E' Sept: 0.05 m/s   E/E' Lat: 11.9   E/E' Sept: 19.33   MV A Glenn: 0.72 m/s   MV Dec Marshall: 4.75 m/s2   MV DecT: 213.2 ms   MV E Glenn: 1.01 m/s   MV E/A Ratio: 1.41   MV PHT: 61.83 ms   MVA By PHT: 3.56 cm2       Prepared and E-signed by       Arturo Duggan MD   Signed 10-Feb-2017 16:24:30           17 0000  2D ECHO LIMTED ADULT W OR WO CONTR Final result    Narrative:  Darryl Thakur [address]   [address]   [phone]       Transthoracic Echocardiogram       Patient: Josseline Soto   MRN: [id number]   ACCT #: [de-identified]   : 1950   Age: 61 years   Gender: Male   Height: 69 in   Weight: 189.6 lb   BSA: 2.02 m squared   BP: 112 / 78 mmHg   Study date: 2017   Status: Routine   Location: [facility]   [facility] ACC #: [id number]       Allergies: HEPARIN (PORCINE)       Ordering Physician:  Spencer Matamoros. Manish Barragan MD   Reading Group:  *CAV Group   Technologist:  Rain Turpin   Reading Physician:  La Carrillo. Magaly Murillo M.D.       Darrell Larose:   Left ventricle: The ventricle was dilated. Systolic function was severely   reduced. Ejection fraction was estimated to be 10 %. There was severe   diffuse hypokinesis. Ventricular septum: There was moderate paradoxical motion. Right ventricle: Systolic function was mildly reduced. Left atrium: The atrium was dilated. Right atrium: The atrium was mildly dilated.        Mitral valve: There was moderate regurgitation. Tricuspid valve: There was mild to moderate regurgitation. INDICATIONS: Impelia placement       PROCEDURE: This was a routine study. The study included complete 2D   imaging, complete spectral Doppler, and color Doppler. The heart rate was   158 bpm, at the start of the study. Systolic blood pressure was 112 mmHg,   at the start of the study. Diastolic blood pressure was 78 mmHg, at the   start of the study. Image quality was suboptimal.       LEFT VENTRICLE: The ventricle was dilated. Systolic function was severely   reduced. Ejection fraction was estimated to be 10 %. There was severe   diffuse hypokinesis. VENTRICULAR SEPTUM: There was moderate paradoxical motion. RIGHT VENTRICLE: The size was normal. Systolic function was mildly   reduced. LEFT ATRIUM: The atrium was dilated. RIGHT ATRIUM: The atrium was mildly dilated. MITRAL VALVE: There was mild-moderate diffuse thickening. There was mildly   reduced leaflet separation. DOPPLER: There was moderate regurgitation. AORTIC VALVE: Not well visualized due to catheter across valve. TRICUSPID VALVE: Normal valve structure. There was normal leaflet   separation. DOPPLER: The transtricuspid velocity was within the normal   range. There was no evidence for tricuspid stenosis. There was mild to   moderate regurgitation. Pulmonary artery systolic pressure: 50 mmHg. PULMONIC VALVE: Not well visualized. AORTA: The root exhibited normal size. PERICARDIUM: There was no pericardial effusion. The pericardium was normal   in appearance.        SYSTEM MEASUREMENT TABLES       2D   Ao Diam: 3.95 cm   LA Diam: 5.03 cm   LAAs A2C: 39.04 cm2   LAAs A4C: 32.91 cm2   LAESV A-L A2C: 186.27 ml   LAESV A-L A4C: 130.25 ml   LAESV Index (A-L): 77.74 ml/m2   LAESV MOD A2C: 179.91 ml   LAESV MOD A4C: 121.13 ml   LAESV(A-L): 157.03 ml   LALs A2C: 6.94 cm   LALs A4C: 7.06 cm   %FS: 7.49 % EDV(Teich): 173.63 ml   EF Biplane: 22.81 %   EF(Teich): 16.38 %   ESV(Teich): 145.18 ml   IVSd: 1.06 cm   LVEDV MOD A2C: 249.39 ml   LVEDV MOD A4C: 157.73 ml   LVEDV MOD BP: 204.78 ml   LVEF MOD A2C: 29.37 %   LVEF MOD A4C: 14.61 %   LVESV MOD A2C: 176.14 ml   LVESV MOD A4C: 134.69 ml   LVESV MOD BP: 158.07 ml   LVIDd: 5.91 cm   LVIDs: 5.46 cm   LVLd A2C: 10.43 cm   LVLd A4C: 9.75 cm   LVLs A2C: 9.41 cm   LVLs A4C: 8.9 cm   LVPWd: 0.62 cm   SV MOD A2C: 73.25 ml   SV MOD A4C: 23.05 ml   SV(Teich): 28.45 ml   RVIDd: 4.33 cm       CW   TR Vmax: 3.07 m/s   TR maxP.62 mmHg       PW   E' Lat: 0.09 m/s   E' Sept: 0.04 m/s   E/E' Lat: 9.56   E/E' Sept: 21.11   MV A Glenn: 0.87 m/s   MV Dec Walsh: 8.98 m/s2   MV DecT: 100.15 ms   MV E Glenn: 0.9 m/s   MV E/A Ratio: 1.03   MV PHT: 29.04 ms   MVA By PHT: 7.57 cm2   PV Vmax: 0.41 m/s   PV maxP.67 mmHg       Prepared and E-signed by       Shalini Gilbert. Leopold Corrente, M.D. Signed 2017 15:47:55                 No results found for this visit on 17.       ECG:  EKG: ST with occasional PVC, NSIVCD, LAE    LABS:  Recent Results (from the past 24 hour(s))   LD    Collection Time: 17  6:46 PM   Result Value Ref Range     (H) 85 - 241 U/L   CBC W/O DIFF    Collection Time: 17  6:46 PM   Result Value Ref Range    WBC 5.7 4.1 - 11.1 K/uL    RBC 4.00 (L) 4.10 - 5.70 M/uL    HGB 9.4 (L) 12.1 - 17.0 g/dL    HCT 30.9 (L) 36.6 - 50.3 %    MCV 77.3 (L) 80.0 - 99.0 FL    MCH 23.5 (L) 26.0 - 34.0 PG    MCHC 30.4 30.0 - 36.5 g/dL    RDW 16.6 (H) 11.5 - 14.5 %    PLATELET 414 274 - 575 K/uL   METABOLIC PANEL, COMPREHENSIVE    Collection Time: 17  6:46 PM   Result Value Ref Range    Sodium 134 (L) 136 - 145 mmol/L    Potassium 4.0 3.5 - 5.1 mmol/L    Chloride 98 97 - 108 mmol/L    CO2 28 21 - 32 mmol/L    Anion gap 8 5 - 15 mmol/L    Glucose 133 (H) 65 - 100 mg/dL    BUN 41 (H) 6 - 20 MG/DL    Creatinine 1.85 (H) 0.70 - 1.30 MG/DL    BUN/Creatinine ratio 22 (H) 12 - 20      GFR est AA 45 (L) >60 ml/min/1.73m2    GFR est non-AA 37 (L) >60 ml/min/1.73m2    Calcium 8.1 (L) 8.5 - 10.1 MG/DL    Bilirubin, total 0.8 0.2 - 1.0 MG/DL    ALT (SGPT) 18 12 - 78 U/L    AST (SGOT) 26 15 - 37 U/L    Alk.  phosphatase 139 (H) 45 - 117 U/L    Protein, total 6.7 6.4 - 8.2 g/dL    Albumin 2.8 (L) 3.5 - 5.0 g/dL    Globulin 3.9 2.0 - 4.0 g/dL    A-G Ratio 0.7 (L) 1.1 - 2.2     PTT    Collection Time: 02/09/17  8:19 PM   Result Value Ref Range    aPTT 75.8 (H) 22.1 - 32.5 sec    aPTT, therapeutic range     58.0 - 77.0 SECS   EKG, 12 LEAD, INITIAL    Collection Time: 02/09/17  8:25 PM   Result Value Ref Range    Ventricular Rate 85 BPM    Atrial Rate 85 BPM    P-R Interval 190 ms    QRS Duration 128 ms    Q-T Interval 414 ms    QTC Calculation (Bezet) 492 ms    Calculated P Axis 50 degrees    Calculated R Axis 7 degrees    Calculated T Axis 140 degrees    Diagnosis       Sinus rhythm with fusion complexes  Possible Left atrial enlargement  Nonspecific intraventricular block  Cannot rule out Anterior infarct , age undetermined  When compared with ECG of 23-JAN-2017 10:13,  fusion complexes are now present  QRS duration has increased  Nonspecific T wave abnormality has replaced inverted T waves in Inferior   leads  Confirmed by Karmen Almeida MD, [place] ([id number]) on 2/10/2017 9:37:13 AM     GLUCOSE, POC    Collection Time: 02/09/17  9:19 PM   Result Value Ref Range    Glucose (POC) 130 (H) 65 - 100 mg/dL    Performed by Wendi Lopez    PTT    Collection Time: 02/09/17 10:10 PM   Result Value Ref Range    aPTT 65.0 (H) 22.1 - 32.5 sec    aPTT, therapeutic range     58.0 - 41.5 SECS   METABOLIC PANEL, COMPREHENSIVE    Collection Time: 02/10/17  3:25 AM   Result Value Ref Range    Sodium 135 (L) 136 - 145 mmol/L    Potassium 3.6 3.5 - 5.1 mmol/L    Chloride 99 97 - 108 mmol/L    CO2 27 21 - 32 mmol/L    Anion gap 9 5 - 15 mmol/L    Glucose 86 65 - 100 mg/dL    BUN 39 (H) 6 - 20 MG/DL    Creatinine 1.78 (H) 0.70 - 1.30 MG/DL    BUN/Creatinine ratio 22 (H) 12 - 20      GFR est AA 47 (L) >60 ml/min/1.73m2    GFR est non-AA 39 (L) >60 ml/min/1.73m2    Calcium 8.6 8.5 - 10.1 MG/DL    Bilirubin, total 0.6 0.2 - 1.0 MG/DL    ALT (SGPT) 19 12 - 78 U/L    AST (SGOT) 30 15 - 37 U/L    Alk. phosphatase 139 (H) 45 - 117 U/L    Protein, total 6.7 6.4 - 8.2 g/dL    Albumin 2.8 (L) 3.5 - 5.0 g/dL    Globulin 3.9 2.0 - 4.0 g/dL    A-G Ratio 0.7 (L) 1.1 - 2.2     MAGNESIUM    Collection Time: 02/10/17  3:25 AM   Result Value Ref Range    Magnesium 2.3 1.6 - 2.4 mg/dL   CBC W/O DIFF    Collection Time: 02/10/17  3:25 AM   Result Value Ref Range    WBC 5.9 4.1 - 11.1 K/uL    RBC 3.85 (L) 4.10 - 5.70 M/uL    HGB 9.2 (L) 12.1 - 17.0 g/dL    HCT 30.0 (L) 36.6 - 50.3 %    MCV 77.9 (L) 80.0 - 99.0 FL    MCH 23.9 (L) 26.0 - 34.0 PG    MCHC 30.7 30.0 - 36.5 g/dL    RDW 16.8 (H) 11.5 - 14.5 %    PLATELET 801 926 - 814 K/uL   PTT    Collection Time: 02/10/17  3:25 AM   Result Value Ref Range    aPTT 77.6 (H) 22.1 - 32.5 sec    aPTT, therapeutic range     58.0 - 77.0 SECS   LD    Collection Time: 02/10/17  3:25 AM   Result Value Ref Range     (H) 85 - 241 U/L   POC G3 - PUL    Collection Time: 02/10/17  3:37 AM   Result Value Ref Range    FIO2 (POC) 36 %    pH (POC) 7.416 7.35 - 7.45      pCO2 (POC) 39.0 35.0 - 45.0 MMHG    pO2 (POC) 25 (LL) 80 - 100 MMHG    HCO3 (POC) 25.1 22 - 26 MMOL/L    sO2 (POC) 47 (L) 92 - 97 %    Base excess (POC) 1 mmol/L    Site SWAN TAVIA      Device: NASAL CANNULA      Flow rate (POC) 4 L/M    Allens test (POC) N/A      Specimen type (POC) ARTERIAL      Total resp.  rate 15     PTT    Collection Time: 02/10/17  5:42 AM   Result Value Ref Range    aPTT 79.8 (H) 22.1 - 32.5 sec    aPTT, therapeutic range     58.0 - 77.0 SECS   PTT    Collection Time: 02/10/17 10:15 AM   Result Value Ref Range    aPTT 72.2 (H) 22.1 - 32.5 sec    aPTT, therapeutic range     58.0 - 77.0 SECS   HGB & HCT    Collection Time: 02/10/17 10:15 AM   Result Value Ref Range    HGB 9.2 (L) 12.1 - 17.0 g/dL    HCT 29.8 (L) 36.6 - 50.3 %   LD    Collection Time: 02/10/17 10:15 AM   Result Value Ref Range     (H) 85 - 241 U/L   GLUCOSE, POC    Collection Time: 02/10/17  1:31 PM   Result Value Ref Range    Glucose (POC) 141 (H) 65 - 100 mg/dL    Performed by Gerg, POC    Collection Time: 02/10/17  5:44 PM   Result Value Ref Range    Glucose (POC) 88 65 - 100 mg/dL    Performed by Lenore Olsen      All Micro Results     Procedure Component Value Units Date/Time    CULTURE, BLOOD, PAIRED [[id number]] Collected:  02/05/17 0322    Order Status:  Completed Specimen:  Blood Updated:  02/10/17 0542     Special Requests: NO SPECIAL REQUESTS        Culture result: NO GROWTH 5 DAYS       CULTURE, BODY FLUID Car Agosto STAIN [[id number]] Collected:  01/26/17 1505    Order Status:  Completed Specimen:  Thoracentesis Updated:  01/30/17 1057     Special Requests: NO SPECIAL REQUESTS        GRAM STAIN OCCASIONAL  WBCS SEEN         NO ORGANISMS SEEN        Culture result: NO GROWTH 4 DAYS       CULTURE, Franco Diana STAIN [[id number]] Collected:  01/25/17 2134    Order Status:  Completed Specimen:  Leg Updated:  01/27/17 1440     Special Requests: NO SPECIAL REQUESTS        GRAM STAIN RARE  WBCS SEEN         NO ORGANISMS SEEN        Culture result: LIGHT  STREPTOCOCCI, BETA HEMOLYTIC GROUP C  . .. Penicillin and ampicillin are drugs of choice for treatment of beta-hemolytic streptococcal infections. Susceptibility testing of penicillins and beta-lactams approved by the FDA for treatment of beta-hemolytic streptococcal infections need not be performed routinely, because nonsusceptible isolates are extremely rare.  CLSI 2012         MODERATE  BETTIE TROPICALIS           Abdominal Ultrasound 2/4/17  IMPRESSION: Distended gallbladder with gallbladder wall thickening,  pericholecystic fluid and tenderness to probe palpation during exam. Findings  are consistent with acalculus cholecystitis. Thoracentesis 1/26/17  Specimen Source   1: Pleural Fluid   CYTOLOGIC INTERPRETATION:   Scattered mesothelial cells with degenerative changes and mixed inflammatory cells   General Categorization   No cells diagnostic for malignancy   Specimen Adequacy   Satisfactory for evaluation       Cardiac MRI 1/31/17  IMPRESSION  1. Markedly dilated left ventricle with 3-D end-diastolic volume index to body  surface area of 153 mL/sq m. Mild eccentric left ventricular hypertrophy with  3-D mass index to body surface area of 85 g/sq m. Severe left ventricular  systolic dysfunction. Severe global hypokinesis with regional variation. 3-D  LVEF 10%. 2. Moderately dilated right ventricle by 3-D volumetric assessment. RV end  diastolic volume indexed to body surface area of 138 mL/sq m. Moderate to severe  right ventricular systolic dysfunction. Global hypokinesis. 3-D RVEF 25%. 3. Apical a tethered mitral valve leaflets. Mild mitral regurgitation. 4. Moderately severe 3+ tricuspid regurgitation. 5. On LGE study, the anterior wall, anteroseptal wall, anteroapical wall, and  anterior lateral wall are largely viable without significant myocardial  infarction. The entire inferior wall and inferoseptal wall are completely viable  without any infarct. The base to mid inferolateral wall demonstrate a near  transmural greater than 75% thickness infarct with minimal or no viable  myocardium in this territory. The distal inferolateral wall, apical lateral wall  does not demonstrate any infarct and are largely viable. Based on the viability  imaging, the entire LAD territory is largely viable and should recover upon  revascularization. The entire RCA territory is largely viable and should recover  upon revascularization. The LCx territory demonstrate medium-size infarct with  limited viability. 6. Large left-sided pleural effusion with passive atelectasis of the left lung.   7. Dilated branch pulmonary arteries suggest pulmonary hypertension. 8. Markedly dilated left atrium measuring 59 x 55 mm. Moderately dilated right  atrium measuring 46 x 56 mm.     Medications reviewed:    Current Facility-Administered Medications   Medication Dose Route Frequency    bisacodyl (DULCOLAX) suppository 10 mg  10 mg Rectal DAILY PRN    magnesium hydroxide (MILK OF MAGNESIA) 400 mg/5 mL oral suspension 30 mL  30 mL Oral DAILY PRN    morphine injection 1-2 mg  1-2 mg IntraVENous Q3H PRN    sodium chloride (NS) flush 5-10 mL  5-10 mL IntraVENous Q8H    sodium chloride (NS) flush 5-10 mL  5-10 mL IntraVENous PRN    hydrocortisone Sod Succ (PF) (SOLU-CORTEF) injection 100 mg  100 mg IntraVENous ONCE PRN    nitroglycerin (NITROSTAT) tablet 0.4 mg  0.4 mg SubLINGual Q5MIN PRN    clopidogrel (PLAVIX) tablet 75 mg  75 mg Oral DAILY    bivalirudin (ANGIOMAX) 250 mg in 0.9% sodium chloride (MBP/ADV) 50 mL  0.2-2.5 mg/kg/hr IntraVENous CONTINUOUS    dextrose 5% infusion  14 mL/hr IntraVENous CONTINUOUS    bumetanide (BUMEX) tablet 2 mg  2 mg Oral TID    docusate sodium (COLACE) capsule 100 mg  100 mg Oral DAILY PRN    piperacillin-tazobactam (ZOSYN) 3.375 g in 0.9% sodium chloride (MBP/ADV) 100 mL  3.375 g IntraVENous Q8H    nicotine (NICODERM CQ) 21 mg/24 hr patch 1 Patch  1 Patch TransDERmal DAILY    glimepiride (AMARYL) tablet 4 mg  4 mg Oral ACB    insulin glargine (LANTUS) injection 10 Units  10 Units SubCUTAneous DAILY    gabapentin (NEURONTIN) capsule 100 mg  100 mg Oral BID    milrinone (PRIMACOR) 20 MG/100 ML D5W infusion  0.375 mcg/kg/min IntraVENous CONTINUOUS    carvedilol (COREG) tablet 3.125 mg  3.125 mg Oral BID WITH MEALS    amiodarone (CORDARONE) tablet 200 mg  200 mg Oral Q12H    bacitracin 500 unit/gram packet 1 Packet  1 Packet Topical PRN    atorvastatin (LIPITOR) tablet 10 mg  10 mg Oral DAILY    0.9% sodium chloride infusion  10 mL/hr IntraVENous CONTINUOUS    acetaminophen (TYLENOL) tablet 650 mg  650 mg Oral Q6H PRN    aspirin delayed-release tablet 81 mg  81 mg Oral DAILY    glucagon (GLUCAGEN) injection 1 mg  1 mg IntraMUSCular PRN    glucose chewable tablet 16 g  4 Tab Oral PRN    insulin lispro (HUMALOG) injection   SubCUTAneous AC&HS    magnesium oxide (MAG-OX) tablet 400 mg  400 mg Oral DAILY    0.9% sodium chloride infusion  3 mL/hr IntraVENous CONTINUOUS    sodium chloride (NS) flush 5-10 mL  5-10 mL IntraVENous Q8H    sodium chloride (NS) flush 5-10 mL  5-10 mL IntraVENous PRN    albuterol (PROVENTIL VENTOLIN) nebulizer solution 2.5 mg  2.5 mg Nebulization Q4H PRN    diphenhydrAMINE (BENADRYL) capsule 25 mg  25 mg Oral QHS PRN    oxyCODONE-acetaminophen (PERCOCET) 5-325 mg per tablet 2 Tab  2 Tab Oral Q4H PRN    ELECTROLYTE REPLACEMENT PROTOCOL  1 Each Other PRN     HIDA Scan 2/7/17  Gallbladder emptying study was performed per protocol utilizing 5. 2mCi Tc-99 M  Choletec and 1.86 mcg CCK intravenously. There is normal tracer distribution  throughout the liver. Activity is noted in the gallbladder, common bile duct,  and small bowel. The gallbladder ejection fraction is 31% (normal greater than  or equal to 35%).    IMPRESSION: Abnormal gallbladder emptying study with an EF of only 31%.        IMPRESSION:   1. Acute on chronic systolic heart failure (ICM) - Stage D, NYHA Class IV  2. Severe native coronary artery disease - s/p PCI to RCA and Left Main with Impella support  3. Diabetes Mellitus, Hga1c 13.5  4. History of tobacco abuse  5. Cellulitis  6. CHACORTA on CKD3  7. Pulmonary HTN  8. Persistent atrial fibrillation  9. Hyponatremia  10. Left pleural effusion  11. Acalculous Cholecystitis       PLAN:   1. Increasee  IV milrinone 0.375 mcg/kg/min, Follow I/O, Replete electrolytes, Hold ACE-I due to CHACORTA on CKD. Continue low dose coreg and reduce to po bumex 2 mg bid  2. Continue ASA, clopidogrel, bivalrudin, statin, low dose BB  3.  Continue Impella support at P6 - plan to remove on Monday with groin cutdown  4. Lantus and sliding scale insulin, accuchecks, diabetic education  5. IV cefazolin 1 gram q 8 hours for cellulitis and IV zosyn 3.375 g q 8 hours for choleycystitis  6. Smoking cessation counseling, nicotine patch         Critical care was necessary to treat or prevent imminent or life threatening deterioration of the following conditions: cardiac failure, respiratory failure and CNS failure or compromise    Total Critical Care time spent:  45 minutes. There was no overlap with other services    Services Provided:  1. Telemetry review and 12 lead ECG interpretation  2. Hemodynamic interpretation, assessment, and management  3. Review and interpretation of CXR  4. Review and interpretation of lab values  5. Review and interpretation of microbiologic data and culture results  6. Review of medications and administration  7. Review and interpretation of nutrition requirements and management  8. Discussion of management withother consultants and services  9. Clinical update to family members    Yung Alcantar MD, [place] [address] Director    [address] [place] [address], [address] [place] [address]  Office [phone]  Fax [phone]  24 hour VAD/HF Pager: [phone]

## 2017-02-10 NOTE — PALLIATIVE CARE
Palliative Medicine Social Work    Checked on patient today. He was resting comfortably and glad to have surgery behind him. Glad as well that family was here. He is eager for catheters to be removed later today. No other issues for our team to address at present. Will sign off. Please re-consult if needed. Thank you for the opportunity to be involved in the care of Spud. Blaire Reees, SAM, [facility]-SW  Palliative Medicine   Respecting Choices ® ACP Facilitator   [phone]

## 2017-02-10 NOTE — PROGRESS NOTES
[facility]   [address] [facility], [facility] [place] [facility], [facility]  Phone: [phone]   [phone]       Nephrology Progress Note  Gwyn Shukla.     1950     [id number]  Date of Admission : 1/20/2017  02/10/17    CC: Follow up for CKD/ARF      Assessment and Plan   CHACORTA   - Cr stable post PCI   - Continue Bumex 2 mg TID today as well   - May be able to lower bumex dose tomorrow, will see how it goes after removing impella   - labs in  The  afternoon     Lytes : replete K and Mg     CKD :  Baseline Cr unknown   Presumed to be 2/2 untreated DM, HTN    Thrombocytopenia :  -  CECILIA Ab +ve. REJI +VE --> avoid Heparin     Hyponatremia :  - combination of  CHF + SIADH from chronic alcoholism  - Na stable    Acute on Chronic Systolic CHF w/ severe CMP  - echo with EF 5-10%, severely dilated LV, severe TR  - Multivessel CAD : MRI showed viable myocardium   - on Milrinone and has Impella   - s/p LAD and RCA stents 2/9    Cellulitis RLE w/ Pustular lesions : resolved    Pulm HTN     Chronic smoker w/VENANCIO -PNA    Biliary Akinesia        Interval History:  DID WELL overnight   Good diuresis   Renalo function stable   Remained On impella support   R foot- DP +, PT- not felt-- chronic   No N/V/CP      Review of Systems: Pertinent items are noted in HPI.     Current Medications:   Current Facility-Administered Medications   Medication Dose Route Frequency    potassium chloride 20 mEq in 50 ml IVPB  20 mEq IntraVENous Q1H    sodium chloride (NS) flush 5-10 mL  5-10 mL IntraVENous Q8H    sodium chloride (NS) flush 5-10 mL  5-10 mL IntraVENous PRN    hydrocortisone Sod Succ (PF) (SOLU-CORTEF) injection 100 mg  100 mg IntraVENous ONCE PRN    nitroglycerin (NITROSTAT) tablet 0.4 mg  0.4 mg SubLINGual Q5MIN PRN    clopidogrel (PLAVIX) tablet 75 mg  75 mg Oral DAILY    bivalirudin (ANGIOMAX) 250 mg in 0.9% sodium chloride (MBP/ADV) 50 mL  0.2-2.5 mg/kg/hr IntraVENous CONTINUOUS    dextrose 5% infusion 14 mL/hr IntraVENous CONTINUOUS    bumetanide (BUMEX) tablet 2 mg  2 mg Oral TID    docusate sodium (COLACE) capsule 100 mg  100 mg Oral DAILY PRN    piperacillin-tazobactam (ZOSYN) 3.375 g in 0.9% sodium chloride (MBP/ADV) 100 mL  3.375 g IntraVENous Q8H    nicotine (NICODERM CQ) 21 mg/24 hr patch 1 Patch  1 Patch TransDERmal DAILY    glimepiride (AMARYL) tablet 4 mg  4 mg Oral ACB    insulin glargine (LANTUS) injection 10 Units  10 Units SubCUTAneous DAILY    gabapentin (NEURONTIN) capsule 100 mg  100 mg Oral BID    milrinone (PRIMACOR) 20 MG/100 ML D5W infusion  0.3 mcg/kg/min IntraVENous CONTINUOUS    carvedilol (COREG) tablet 3.125 mg  3.125 mg Oral BID WITH MEALS    amiodarone (CORDARONE) tablet 200 mg  200 mg Oral Q12H    bacitracin 500 unit/gram packet 1 Packet  1 Packet Topical PRN    atorvastatin (LIPITOR) tablet 10 mg  10 mg Oral DAILY    0.9% sodium chloride infusion  10 mL/hr IntraVENous CONTINUOUS    acetaminophen (TYLENOL) tablet 650 mg  650 mg Oral Q6H PRN    aspirin delayed-release tablet 81 mg  81 mg Oral DAILY    glucagon (GLUCAGEN) injection 1 mg  1 mg IntraMUSCular PRN    glucose chewable tablet 16 g  4 Tab Oral PRN    insulin lispro (HUMALOG) injection   SubCUTAneous AC&HS    magnesium oxide (MAG-OX) tablet 400 mg  400 mg Oral DAILY    0.9% sodium chloride infusion  3 mL/hr IntraVENous CONTINUOUS    sodium chloride (NS) flush 5-10 mL  5-10 mL IntraVENous Q8H    sodium chloride (NS) flush 5-10 mL  5-10 mL IntraVENous PRN    albuterol (PROVENTIL VENTOLIN) nebulizer solution 2.5 mg  2.5 mg Nebulization Q4H PRN    diphenhydrAMINE (BENADRYL) capsule 25 mg  25 mg Oral QHS PRN    oxyCODONE-acetaminophen (PERCOCET) 5-325 mg per tablet 2 Tab  2 Tab Oral Q4H PRN    ELECTROLYTE REPLACEMENT PROTOCOL  1 Each Other PRN      Allergies   Allergen Reactions    Heparin (Porcine) Unknown (comments)       Objective:  Vitals:    Vitals:    02/10/17 0500 02/10/17 0544 02/10/17 0600 02/10/17 0800   BP:       Pulse: 80  80 80   Resp: 17 17 17   Temp:       SpO2: 96%  95% 96%   Weight:  83.4 kg (183 lb 13.8 oz)     Height:         Intake and Output:     02/08 1901 - 02/10 0700  In: 3692.5 [P.O.:1680; I.V.:2012.5]  Out: 5475 [Urine:5475]    Physical Examination:  General: Appears stated age  Resp:  Lungs CTA  CV:  RRR,  no murmur or rub,+ LE edema  GI:  Soft, distended, NT, + Bowel sounds, no hepatosplenomegaly  Neurologic:  Non focal  Skin:  RLE cellulitis - resolving   Ext                   Edema +, R groin impella catheter       []    High complexity decision making was performed  []    Patient is at high-risk of decompensation with multiple organ involvement    Lab Data Personally Reviewed: I have reviewed all the pertinent labs, microbiology data and radiology studies during assessment. Recent Labs      02/10/17   0325  02/09/17   1846  02/09/17   0401 02/08/17   0353   NA  135*  134*  135*  135*   K  3.6  4.0  3.7  3.9   CL  99  98  98  97   CO2  27  28  27  29   GLU  86  133*  109*  181*   BUN  39*  41*  46*  42*   CREA  1.78*  1.85*  2.05*  1.83*   CA  8.6  8.1*  8.7  8.9   MG  2.3   --   2.2  2.3   ALB  2.8*  2.8*  2.9*  2.8*   SGOT  30  26  17  20   ALT  19  18  20  21     Recent Labs      02/10/17   0325  02/09/17   1846  02/09/17   0401  02/08/17   0353   WBC  5.9  5.7  5.8  5.4   HGB  9.2*  9.4*  9.8*  10.3*   HCT  30.0*  30.9*  31.7*  34.3*   PLT  155  155  161  170     No results found for: SDES  Lab Results   Component Value Date/Time    Culture result: NO GROWTH 5 DAYS 02/05/2017 03:22 AM    Culture result: NO GROWTH 4 DAYS 01/26/2017 03:05 PM    Culture result:  01/25/2017 09:34 PM     LIGHT  STREPTOCOCCI, BETA HEMOLYTIC GROUP C  . .. Penicillin and ampicillin are drugs of choice for treatment of beta-hemolytic streptococcal infections.  Susceptibility testing of penicillins and beta-lactams approved by the FDA for treatment of beta-hemolytic streptococcal infections need not be performed routinely, because nonsusceptible isolates are extremely rare.  CLSI 2012      Culture result: MODERATE  CANDIDA TROPICALIS   01/25/2017 09:34 PM    Culture result: NO SIGNIFICANT GROWTH 01/19/2017 01:35 AM     Recent Results (from the past 24 hour(s))   POC ACTIVATED CLOTTING TIME    Collection Time: 02/09/17  9:42 AM   Result Value Ref Range    Activated Clotting Time (POC) 301 (H) 79 - 138 SECS   POC ACTIVATED CLOTTING TIME    Collection Time: 02/09/17 11:27 AM   Result Value Ref Range    Activated Clotting Time (POC) 363 (H) 79 - 138 SECS   GLUCOSE, POC    Collection Time: 02/09/17  1:57 PM   Result Value Ref Range    Glucose (POC) 101 (H) 65 - 100 mg/dL    Performed by Maryan Laguna*    PTT    Collection Time: 02/09/17  2:16 PM   Result Value Ref Range    aPTT 86.1 (H) 22.1 - 32.5 sec    aPTT, therapeutic range     58.0 - 77.0 SECS   GLUCOSE, POC    Collection Time: 02/09/17  4:51 PM   Result Value Ref Range    Glucose (POC) 116 (H) 65 - 100 mg/dL    Performed by Maryan Laguna*    PTT    Collection Time: 02/09/17  4:52 PM   Result Value Ref Range    aPTT 113.9 (HH) 22.1 - 32.5 sec    aPTT, therapeutic range     58.0 - 77.0 SECS   LD    Collection Time: 02/09/17  6:46 PM   Result Value Ref Range     (H) 85 - 241 U/L   CBC W/O DIFF    Collection Time: 02/09/17  6:46 PM   Result Value Ref Range    WBC 5.7 4.1 - 11.1 K/uL    RBC 4.00 (L) 4.10 - 5.70 M/uL    HGB 9.4 (L) 12.1 - 17.0 g/dL    HCT 30.9 (L) 36.6 - 50.3 %    MCV 77.3 (L) 80.0 - 99.0 FL    MCH 23.5 (L) 26.0 - 34.0 PG    MCHC 30.4 30.0 - 36.5 g/dL    RDW 16.6 (H) 11.5 - 14.5 %    PLATELET 959 646 - 673 K/uL   METABOLIC PANEL, COMPREHENSIVE    Collection Time: 02/09/17  6:46 PM   Result Value Ref Range    Sodium 134 (L) 136 - 145 mmol/L    Potassium 4.0 3.5 - 5.1 mmol/L    Chloride 98 97 - 108 mmol/L    CO2 28 21 - 32 mmol/L    Anion gap 8 5 - 15 mmol/L    Glucose 133 (H) 65 - 100 mg/dL    BUN 41 (H) 6 - 20 MG/DL    Creatinine 1.85 (H) 0.70 - 1.30 MG/DL    BUN/Creatinine ratio 22 (H) 12 - 20      GFR est AA 45 (L) >60 ml/min/1.73m2    GFR est non-AA 37 (L) >60 ml/min/1.73m2    Calcium 8.1 (L) 8.5 - 10.1 MG/DL    Bilirubin, total 0.8 0.2 - 1.0 MG/DL    ALT (SGPT) 18 12 - 78 U/L    AST (SGOT) 26 15 - 37 U/L    Alk.  phosphatase 139 (H) 45 - 117 U/L    Protein, total 6.7 6.4 - 8.2 g/dL    Albumin 2.8 (L) 3.5 - 5.0 g/dL    Globulin 3.9 2.0 - 4.0 g/dL    A-G Ratio 0.7 (L) 1.1 - 2.2     PTT    Collection Time: 02/09/17  8:19 PM   Result Value Ref Range    aPTT 75.8 (H) 22.1 - 32.5 sec    aPTT, therapeutic range     58.0 - 77.0 SECS   EKG, 12 LEAD, INITIAL    Collection Time: 02/09/17  8:25 PM   Result Value Ref Range    Ventricular Rate 85 BPM    Atrial Rate 85 BPM    P-R Interval 190 ms    QRS Duration 128 ms    Q-T Interval 414 ms    QTC Calculation (Bezet) 492 ms    Calculated P Axis 50 degrees    Calculated R Axis 7 degrees    Calculated T Axis 140 degrees    Diagnosis       Sinus rhythm with fusion complexes  Possible Left atrial enlargement  Nonspecific intraventricular block  Cannot rule out Anterior infarct , age undetermined  When compared with ECG of 23-JAN-2017 10:13,  fusion complexes are now present  QRS duration has increased  Nonspecific T wave abnormality has replaced inverted T waves in Inferior   leads     GLUCOSE, POC    Collection Time: 02/09/17  9:19 PM   Result Value Ref Range    Glucose (POC) 130 (H) 65 - 100 mg/dL    Performed by Yrn Hernández    PTT    Collection Time: 02/09/17 10:10 PM   Result Value Ref Range    aPTT 65.0 (H) 22.1 - 32.5 sec    aPTT, therapeutic range     58.0 - 06.1 SECS   METABOLIC PANEL, COMPREHENSIVE    Collection Time: 02/10/17  3:25 AM   Result Value Ref Range    Sodium 135 (L) 136 - 145 mmol/L    Potassium 3.6 3.5 - 5.1 mmol/L    Chloride 99 97 - 108 mmol/L    CO2 27 21 - 32 mmol/L    Anion gap 9 5 - 15 mmol/L    Glucose 86 65 - 100 mg/dL    BUN 39 (H) 6 - 20 MG/DL    Creatinine 1.78 (H) 0.70 - 1.30 MG/DL    BUN/Creatinine ratio 22 (H) 12 - 20      GFR est AA 47 (L) >60 ml/min/1.73m2    GFR est non-AA 39 (L) >60 ml/min/1.73m2    Calcium 8.6 8.5 - 10.1 MG/DL    Bilirubin, total 0.6 0.2 - 1.0 MG/DL    ALT (SGPT) 19 12 - 78 U/L    AST (SGOT) 30 15 - 37 U/L    Alk. phosphatase 139 (H) 45 - 117 U/L    Protein, total 6.7 6.4 - 8.2 g/dL    Albumin 2.8 (L) 3.5 - 5.0 g/dL    Globulin 3.9 2.0 - 4.0 g/dL    A-G Ratio 0.7 (L) 1.1 - 2.2     MAGNESIUM    Collection Time: 02/10/17  3:25 AM   Result Value Ref Range    Magnesium 2.3 1.6 - 2.4 mg/dL   CBC W/O DIFF    Collection Time: 02/10/17  3:25 AM   Result Value Ref Range    WBC 5.9 4.1 - 11.1 K/uL    RBC 3.85 (L) 4.10 - 5.70 M/uL    HGB 9.2 (L) 12.1 - 17.0 g/dL    HCT 30.0 (L) 36.6 - 50.3 %    MCV 77.9 (L) 80.0 - 99.0 FL    MCH 23.9 (L) 26.0 - 34.0 PG    MCHC 30.7 30.0 - 36.5 g/dL    RDW 16.8 (H) 11.5 - 14.5 %    PLATELET 340 815 - 011 K/uL   PTT    Collection Time: 02/10/17  3:25 AM   Result Value Ref Range    aPTT 77.6 (H) 22.1 - 32.5 sec    aPTT, therapeutic range     58.0 - 77.0 SECS   LD    Collection Time: 02/10/17  3:25 AM   Result Value Ref Range     (H) 85 - 241 U/L   POC G3 - PUL    Collection Time: 02/10/17  3:37 AM   Result Value Ref Range    FIO2 (POC) 36 %    pH (POC) 7.416 7.35 - 7.45      pCO2 (POC) 39.0 35.0 - 45.0 MMHG    pO2 (POC) 25 (LL) 80 - 100 MMHG    HCO3 (POC) 25.1 22 - 26 MMOL/L    sO2 (POC) 47 (L) 92 - 97 %    Base excess (POC) 1 mmol/L    Site SWAN TAVIA      Device: NASAL CANNULA      Flow rate (POC) 4 L/M    Allens test (POC) N/A      Specimen type (POC) ARTERIAL      Total resp. rate 15     PTT    Collection Time: 02/10/17  5:42 AM   Result Value Ref Range    aPTT 79.8 (H) 22.1 - 32.5 sec    aPTT, therapeutic range     58.0 - 77.0 SECS           I have reviewed the flowsheets. Chart and Pertinent Notes have been reviewed. No change in PMH ,family and social history from Consult note.       Leslie Gibson MD

## 2017-02-10 NOTE — PROGRESS NOTES
Advanced Heart Failure ICU Note      Admit Date: 2017       Procedure:  SVO2 swan placed via RFV  Impella placed via LFA  PCI performed via RFA     Successful MIKE pRCA: 2.5x38 Xience + 2.75x12 Xience overlapping. Successful MIKE ost left main: 4.0x12 Xience post-dil with 4.5x12 NC. By Dr. Michaelle Castillo        Subjective:     Pt reports he feels good today - anxious to get Impella out today. Remains on Milrinone at .375 mcg/kg/min and Angiomax. Hemodynamics stable. Weight down 7 lbs, negative 677.     IMPRESSION/Plan:   s/p PCI w/ Impella support - Impella support decreased to P5 this morning - will turn down Impella support and check echocardiogram.  Cont. Angiomax d/t CECILIA, on ASA and Plavix per cardiology, continue milrinone. Remain on statin, low dose BB; no ACEi d/t renal dysfunction. Possible removal of Impella today depending on echo. Acute on chronic systolic heart failure (ICM) - Stage D, NYHA Class IV - increase   Milrinone 0.375 mcg/kg/min, cont. Current dosage of Bumex 2 mg TID, continue low dose Coreg, Strict I/Os, Replete electrolytes prn, Hold ACE-I due to CHACORTA on CKD. Daily weights      Acalculus Cholecystitis - improving, cont. To monitor, continue Zosyn. HIDA showed EF 31%.      L pleural effusion - check upright x-ray when Impella removed, cont. Diuretics     CECILIA - REJI positive, platelets 310 today - close f/u NO HEPARIN     Hyponatremia - improving, trend     Diabetes Mellitus - cont. Current treatment coverage, close monitor of BS. DTC following     Cellulitis - cont. Po abx per ID. Legs improving - abx per ID     H/o Tobacco abuse - smoking cessation, nicotine patch     CHACORTA on CKD3 - stable, renal following - Bumex to 3 mg TID for now      Anemia - stable, monitor     Pulmonary HTN -           Objective:   Vitals:  Blood pressure 112/78, pulse 85, temperature 97.6 °F (36.4 °C), resp. rate 20, height 5' 9\" (1.753 m), weight 183 lb 13.8 oz (83.4 kg), SpO2 95 %.   Temp (24hrs), Av °F (36.7 °C), Min:97 °F (36.1 °C), Max:98.5 °F (36.9 °C)      Hemodynamics:   CO: CO (l/min): 5.7 l/min   CI: CI (l/min/m2): 2.5 l/min/m2   CVP: CVP (mmHg): 11 mmHg (02/10/17 0900)   SVR: SVR (dyne*sec)/cm5: 1069 (dyne*sec)/cm5 (02/10/17 2776)   PAP Systolic: PAP Systolic: 56 (47/62/80 5619)   PAP Diastolic: PAP Diastolic: 25 (52/92/03 2263)   PVR:     SV02: SVO2 (%): 47 % (02/10/17 0900)   SCV02:           Oxygen Therapy:  Oxygen Therapy  O2 Sat (%): 95 % (02/10/17 0900)  Pulse via Oximetry: 85 beats per minute (02/10/17 0900)  O2 Device: Nasal cannula (02/10/17 0800)  O2 Flow Rate (L/min): 4 l/min (02/10/17 0800)  ETCO2 (mmHg): 1 mmHg (02/07/17 0821)    EKG: sinus tach    CXR - Unchanged life support lines and tubes including a an Impella device. Slight  increase in left-sided pleural effusion may be accentuated by positional  differences. Admission Weight: Last Weight   Weight: 178 lb 2.1 oz (80.8 kg) Weight: 183 lb 13.8 oz (83.4 kg)     Intake / Output / Drain:  Current Shift:    Last 24 hrs.: 02/08 1901 - 02/10 0700  In: 3692.5 [P.O.:1680; I.V.:2012.5]  Out: 5475 [Urine:5475]      EXAM:    HEENT:  EOMI       CVS:  S1/S2       LUNGS:  B/l crackles anter.                   ABD:  +BS, soft, NT/ND                  EXT:  1+ b/l LE edema, +erythema       Labs:   Lab Results   Component Value Date/Time    Glucose (POC) 130 02/09/2017 09:19 PM    Glucose (POC) 116 02/09/2017 04:51 PM    Glucose (POC) 101 02/09/2017 01:57 PM    Glucose (POC) 106 02/09/2017 07:56 AM    Glucose (POC) 128 02/08/2017 09:21 PM     Recent Labs      02/10/17   0325  02/09/17   1846  02/09/17   0401   NA  135*  134*  135*   K  3.6  4.0  3.7   CL  99  98  98   CO2  27  28  27   BUN  39*  41*  46*   CREA  1.78*  1.85*  2.05*   GLU  86  133*  109*   CA  8.6  8.1*  8.7   ALB  2.8*  2.8*  2.9*   WBC  5.9  5.7  5.8   HGB  9.2*  9.4*  9.8*   HCT  30.0*  30.9*  31.7*   PLT  155  155  161       Pt seen w/ Dr. Jessica Mendez and Dr. Dmitriy Edwards    Signed By: Bobbi Jimenez Vanessa Brooks PA-C    Saw patient, agree with above  Risk of morbidity and mortality - high  Medical decision making - high complexity

## 2017-02-10 NOTE — PROGRESS NOTES
Problem: Falls - Risk of  Goal: *Absence of falls  Currently on bedrest, alert/oriented x4- no attempts to arise from bed. No falls on admission thus far, belongings and call bell within reach     Problem: Pressure Ulcer - Risk of  Goal: *Prevention of pressure ulcer  Outcome: Progressing Towards Goal  Appetite good, on TCS bed, monitoring glucose ACHS, turning q2h    Problem: Heart Failure: Day 5  Goal: Off Pathway (Use only if patient is Off Pathway)  Outcome: Not Progressing Towards Goal  In ICU on impella support post cath, CI >2.0, SV02 >45% majority of night.  Remains on milrinone for inotrope support

## 2017-02-11 ENCOUNTER — APPOINTMENT (OUTPATIENT)
Dept: GENERAL RADIOLOGY | Age: 67
DRG: 215 | End: 2017-02-11
Attending: PHYSICIAN ASSISTANT
Payer: SELF-PAY

## 2017-02-11 LAB
ALBUMIN SERPL BCP-MCNC: 2.8 G/DL (ref 3.5–5)
ALBUMIN/GLOB SERPL: 0.8 {RATIO} (ref 1.1–2.2)
ALP SERPL-CCNC: 118 U/L (ref 45–117)
ALT SERPL-CCNC: 17 U/L (ref 12–78)
ANION GAP BLD CALC-SCNC: 8 MMOL/L (ref 5–15)
APTT PPP: 68.8 SEC (ref 22.1–32.5)
APTT PPP: 71.7 SEC (ref 22.1–32.5)
APTT PPP: 72.3 SEC (ref 22.1–32.5)
APTT PPP: 78 SEC (ref 22.1–32.5)
ARTERIAL PATENCY WRIST A: ABNORMAL
AST SERPL W P-5'-P-CCNC: 22 U/L (ref 15–37)
BASE EXCESS BLDV CALC-SCNC: 5 MMOL/L
BDY SITE: ABNORMAL
BILIRUB SERPL-MCNC: 0.7 MG/DL (ref 0.2–1)
BUN SERPL-MCNC: 35 MG/DL (ref 6–20)
BUN/CREAT SERPL: 22 (ref 12–20)
CALCIUM SERPL-MCNC: 8.5 MG/DL (ref 8.5–10.1)
CHLORIDE SERPL-SCNC: 97 MMOL/L (ref 97–108)
CO2 SERPL-SCNC: 30 MMOL/L (ref 21–32)
CREAT SERPL-MCNC: 1.62 MG/DL (ref 0.7–1.3)
ERYTHROCYTE [DISTWIDTH] IN BLOOD BY AUTOMATED COUNT: 17 % (ref 11.5–14.5)
GAS FLOW.O2 O2 DELIVERY SYS: ABNORMAL L/MIN
GAS FLOW.O2 SETTING OXYMISER: 3 L/M
GLOBULIN SER CALC-MCNC: 3.7 G/DL (ref 2–4)
GLUCOSE BLD STRIP.AUTO-MCNC: 103 MG/DL (ref 65–100)
GLUCOSE BLD STRIP.AUTO-MCNC: 132 MG/DL (ref 65–100)
GLUCOSE BLD STRIP.AUTO-MCNC: 159 MG/DL (ref 65–100)
GLUCOSE BLD STRIP.AUTO-MCNC: 160 MG/DL (ref 65–100)
GLUCOSE SERPL-MCNC: 186 MG/DL (ref 65–100)
HCO3 BLDV-SCNC: 30.3 MMOL/L (ref 23–28)
HCT VFR BLD AUTO: 29.3 % (ref 36.6–50.3)
HCT VFR BLD AUTO: 29.7 % (ref 36.6–50.3)
HCT VFR BLD AUTO: 29.8 % (ref 36.6–50.3)
HCT VFR BLD AUTO: 30.4 % (ref 36.6–50.3)
HGB BLD-MCNC: 9.1 G/DL (ref 12.1–17)
HGB BLD-MCNC: 9.2 G/DL (ref 12.1–17)
HGB BLD-MCNC: 9.2 G/DL (ref 12.1–17)
HGB BLD-MCNC: 9.3 G/DL (ref 12.1–17)
LDH SERPL L TO P-CCNC: 396 U/L (ref 85–241)
LDH SERPL L TO P-CCNC: 404 U/L (ref 85–241)
LDH SERPL L TO P-CCNC: 502 U/L (ref 85–241)
MAGNESIUM SERPL-MCNC: 2.2 MG/DL (ref 1.6–2.4)
MCH RBC QN AUTO: 24 PG (ref 26–34)
MCHC RBC AUTO-ENTMCNC: 30.9 G/DL (ref 30–36.5)
MCV RBC AUTO: 77.8 FL (ref 80–99)
O2/TOTAL GAS SETTING VFR VENT: 32 %
PCO2 BLDV: 48.8 MMHG (ref 41–51)
PH BLDV: 7.4 [PH] (ref 7.32–7.42)
PLATELET # BLD AUTO: 145 K/UL (ref 150–400)
PO2 BLDV: 32 MMHG (ref 25–40)
POTASSIUM SERPL-SCNC: 3.5 MMOL/L (ref 3.5–5.1)
PROT SERPL-MCNC: 6.5 G/DL (ref 6.4–8.2)
RBC # BLD AUTO: 3.83 M/UL (ref 4.1–5.7)
SAO2 % BLDV: 60 % (ref 65–88)
SERVICE CMNT-IMP: ABNORMAL
SODIUM SERPL-SCNC: 135 MMOL/L (ref 136–145)
SPECIMEN TYPE: ABNORMAL
THERAPEUTIC RANGE,PTTT: ABNORMAL SECS (ref 58–77)
TOTAL RESP. RATE, ITRR: 16
WBC # BLD AUTO: 7.6 K/UL (ref 4.1–11.1)

## 2017-02-11 PROCEDURE — 83615 LACTATE (LD) (LDH) ENZYME: CPT | Performed by: PHYSICIAN ASSISTANT

## 2017-02-11 PROCEDURE — 82803 BLOOD GASES ANY COMBINATION: CPT

## 2017-02-11 PROCEDURE — 71010 XR CHEST PORT: CPT

## 2017-02-11 PROCEDURE — 74011000258 HC RX REV CODE- 258: Performed by: PHYSICIAN ASSISTANT

## 2017-02-11 PROCEDURE — 77010033678 HC OXYGEN DAILY

## 2017-02-11 PROCEDURE — 74011250637 HC RX REV CODE- 250/637: Performed by: INTERNAL MEDICINE

## 2017-02-11 PROCEDURE — 82962 GLUCOSE BLOOD TEST: CPT

## 2017-02-11 PROCEDURE — 74011636637 HC RX REV CODE- 636/637: Performed by: PHYSICIAN ASSISTANT

## 2017-02-11 PROCEDURE — 83735 ASSAY OF MAGNESIUM: CPT | Performed by: PHYSICIAN ASSISTANT

## 2017-02-11 PROCEDURE — 74011000258 HC RX REV CODE- 258: Performed by: SPECIALIST

## 2017-02-11 PROCEDURE — 74011250636 HC RX REV CODE- 250/636: Performed by: NURSE PRACTITIONER

## 2017-02-11 PROCEDURE — 85730 THROMBOPLASTIN TIME PARTIAL: CPT | Performed by: PHYSICIAN ASSISTANT

## 2017-02-11 PROCEDURE — 85027 COMPLETE CBC AUTOMATED: CPT | Performed by: PHYSICIAN ASSISTANT

## 2017-02-11 PROCEDURE — 65610000003 HC RM ICU SURGICAL

## 2017-02-11 PROCEDURE — 74011250636 HC RX REV CODE- 250/636: Performed by: THORACIC SURGERY (CARDIOTHORACIC VASCULAR SURGERY)

## 2017-02-11 PROCEDURE — 74011250637 HC RX REV CODE- 250/637: Performed by: NURSE PRACTITIONER

## 2017-02-11 PROCEDURE — 74011250637 HC RX REV CODE- 250/637: Performed by: PHYSICIAN ASSISTANT

## 2017-02-11 PROCEDURE — 85730 THROMBOPLASTIN TIME PARTIAL: CPT | Performed by: THORACIC SURGERY (CARDIOTHORACIC VASCULAR SURGERY)

## 2017-02-11 PROCEDURE — 74011250637 HC RX REV CODE- 250/637: Performed by: SPECIALIST

## 2017-02-11 PROCEDURE — 36415 COLL VENOUS BLD VENIPUNCTURE: CPT | Performed by: PHYSICIAN ASSISTANT

## 2017-02-11 PROCEDURE — 74011250636 HC RX REV CODE- 250/636: Performed by: INTERNAL MEDICINE

## 2017-02-11 PROCEDURE — 85018 HEMOGLOBIN: CPT | Performed by: PHYSICIAN ASSISTANT

## 2017-02-11 PROCEDURE — 80053 COMPREHEN METABOLIC PANEL: CPT | Performed by: PHYSICIAN ASSISTANT

## 2017-02-11 PROCEDURE — 74011250636 HC RX REV CODE- 250/636: Performed by: PHYSICIAN ASSISTANT

## 2017-02-11 PROCEDURE — 77030013797 HC KT TRNSDUC PRSSR EDWD -A

## 2017-02-11 RX ORDER — POTASSIUM CHLORIDE 29.8 MG/ML
20 INJECTION INTRAVENOUS
Status: COMPLETED | OUTPATIENT
Start: 2017-02-11 | End: 2017-02-11

## 2017-02-11 RX ORDER — POTASSIUM CHLORIDE 750 MG/1
40 TABLET, FILM COATED, EXTENDED RELEASE ORAL
Status: COMPLETED | OUTPATIENT
Start: 2017-02-11 | End: 2017-02-11

## 2017-02-11 RX ORDER — SPIRONOLACTONE 25 MG/1
25 TABLET ORAL DAILY
Status: DISCONTINUED | OUTPATIENT
Start: 2017-02-12 | End: 2017-02-22 | Stop reason: HOSPADM

## 2017-02-11 RX ADMIN — BUMETANIDE 2 MG: 1 TABLET ORAL at 18:27

## 2017-02-11 RX ADMIN — OXYCODONE HYDROCHLORIDE AND ACETAMINOPHEN 2 TABLET: 5; 325 TABLET ORAL at 20:23

## 2017-02-11 RX ADMIN — Medication 10 ML: at 06:21

## 2017-02-11 RX ADMIN — PIPERACILLIN AND TAZOBACTAM 3.38 G: 3; .375 INJECTION, POWDER, FOR SOLUTION INTRAVENOUS at 15:31

## 2017-02-11 RX ADMIN — SODIUM CHLORIDE 10 ML/HR: 900 INJECTION, SOLUTION INTRAVENOUS at 06:00

## 2017-02-11 RX ADMIN — OXYCODONE HYDROCHLORIDE AND ACETAMINOPHEN 2 TABLET: 5; 325 TABLET ORAL at 06:06

## 2017-02-11 RX ADMIN — AMIODARONE HYDROCHLORIDE 200 MG: 200 TABLET ORAL at 09:13

## 2017-02-11 RX ADMIN — AMIODARONE HYDROCHLORIDE 200 MG: 200 TABLET ORAL at 21:43

## 2017-02-11 RX ADMIN — BIVALIRUDIN 0.37 MG/KG/HR: 250 INJECTION, POWDER, LYOPHILIZED, FOR SOLUTION INTRAVENOUS at 14:18

## 2017-02-11 RX ADMIN — ATORVASTATIN CALCIUM 10 MG: 10 TABLET, FILM COATED ORAL at 09:13

## 2017-02-11 RX ADMIN — GABAPENTIN 100 MG: 100 CAPSULE ORAL at 18:24

## 2017-02-11 RX ADMIN — Medication 10 ML: at 14:00

## 2017-02-11 RX ADMIN — POTASSIUM CHLORIDE 20 MEQ: 400 INJECTION, SOLUTION INTRAVENOUS at 11:58

## 2017-02-11 RX ADMIN — POTASSIUM CHLORIDE 40 MEQ: 750 TABLET, FILM COATED, EXTENDED RELEASE ORAL at 09:13

## 2017-02-11 RX ADMIN — OXYCODONE HYDROCHLORIDE AND ACETAMINOPHEN 2 TABLET: 5; 325 TABLET ORAL at 00:15

## 2017-02-11 RX ADMIN — BIVALIRUDIN 0.37 MG/KG/HR: 250 INJECTION, POWDER, LYOPHILIZED, FOR SOLUTION INTRAVENOUS at 22:59

## 2017-02-11 RX ADMIN — BUMETANIDE 2 MG: 1 TABLET ORAL at 09:12

## 2017-02-11 RX ADMIN — GABAPENTIN 100 MG: 100 CAPSULE ORAL at 09:13

## 2017-02-11 RX ADMIN — SODIUM CHLORIDE 9 ML/HR: 900 INJECTION, SOLUTION INTRAVENOUS at 05:55

## 2017-02-11 RX ADMIN — GLIMEPIRIDE 4 MG: 2 TABLET ORAL at 08:30

## 2017-02-11 RX ADMIN — MILRINONE LACTATE 0.38 MCG/KG/MIN: 200 INJECTION, SOLUTION INTRAVENOUS at 16:36

## 2017-02-11 RX ADMIN — MILRINONE LACTATE 0.38 MCG/KG/MIN: 200 INJECTION, SOLUTION INTRAVENOUS at 08:09

## 2017-02-11 RX ADMIN — CLOPIDOGREL BISULFATE 75 MG: 75 TABLET, FILM COATED ORAL at 09:13

## 2017-02-11 RX ADMIN — Medication 81 MG: at 09:12

## 2017-02-11 RX ADMIN — BIVALIRUDIN 0.37 MG/KG/HR: 250 INJECTION, POWDER, LYOPHILIZED, FOR SOLUTION INTRAVENOUS at 07:58

## 2017-02-11 RX ADMIN — PIPERACILLIN AND TAZOBACTAM 3.38 G: 3; .375 INJECTION, POWDER, FOR SOLUTION INTRAVENOUS at 21:43

## 2017-02-11 RX ADMIN — MILRINONE LACTATE 0.38 MCG/KG/MIN: 200 INJECTION, SOLUTION INTRAVENOUS at 05:30

## 2017-02-11 RX ADMIN — PIPERACILLIN AND TAZOBACTAM 3.38 G: 3; .375 INJECTION, POWDER, FOR SOLUTION INTRAVENOUS at 06:04

## 2017-02-11 RX ADMIN — DEXTROSE MONOHYDRATE 14 ML/HR: 5 INJECTION, SOLUTION INTRAVENOUS at 18:22

## 2017-02-11 RX ADMIN — CARVEDILOL 3.12 MG: 3.12 TABLET, FILM COATED ORAL at 18:24

## 2017-02-11 RX ADMIN — DEXTROSE MONOHYDRATE 14 ML/HR: 5 INJECTION, SOLUTION INTRAVENOUS at 04:05

## 2017-02-11 RX ADMIN — OXYCODONE HYDROCHLORIDE AND ACETAMINOPHEN 2 TABLET: 5; 325 TABLET ORAL at 16:28

## 2017-02-11 RX ADMIN — Medication 400 MG: at 09:13

## 2017-02-11 RX ADMIN — INSULIN GLARGINE 10 UNITS: 100 INJECTION, SOLUTION SUBCUTANEOUS at 09:59

## 2017-02-11 RX ADMIN — DEXTROSE MONOHYDRATE 14 ML/HR: 5 INJECTION, SOLUTION INTRAVENOUS at 08:10

## 2017-02-11 RX ADMIN — POTASSIUM CHLORIDE 20 MEQ: 400 INJECTION, SOLUTION INTRAVENOUS at 09:12

## 2017-02-11 RX ADMIN — CARVEDILOL 3.12 MG: 3.12 TABLET, FILM COATED ORAL at 09:13

## 2017-02-11 RX ADMIN — BUMETANIDE 2 MG: 1 TABLET ORAL at 21:43

## 2017-02-11 NOTE — CARDIO/PULMONARY
Cardiac Wellness; Several attempts have been made to see pt for follow up education reinforcement. His nurse has finished dressing change but he is now sleeping soundly.

## 2017-02-11 NOTE — PROGRESS NOTES
Infectious Diseases Progress Note    Antibiotic Summary:  Levaquin  --   Vancomycin  --   Ancef    -- 2/3  Keflex   2/3 --   Zosyn   -- present      Subjective:     Remains in the ICU with no new symptoms. Objective:     Vitals:   Visit Vitals    /78    Pulse 81    Temp 99.3 °F (37.4 °C)    Resp 18    Ht 5' 9\" (1.753 m)    Wt 83.4 kg (183 lb 13.8 oz)    SpO2 96%    BMI 27.15 kg/m2        Tmax:  Temp (24hrs), Av.3 °F (36.8 °C), Min:97.6 °F (36.4 °C), Max:99.3 °F (37.4 °C)      Exam:  General appearance: alert, no distress  Lungs: clear  Heart: RRR  Abdomen: nontender  Left leg: much improved; no active cellulitis  Right leg: much improved; no active cellulitis    IV Lines: Left PICC inserted     Labs:    Recent Labs      02/10/17   1833  02/10/17   1015  02/10/17   0325  17   1846  17   0401  17   0353   WBC   --    --   5.9  5.7  5.8  5.4   HGB  9.6*  9.2*  9.2*  9.4*  9.8*  10.3*   PLT   --    --   155  155  161  170   BUN   --    --   39*  41*  46*  42*   CREA   --    --   1.78*  1.85*  2.05*  1.83*   TBILI   --    --   0.6  0.8  0.5  0.4   SGOT   --    --   30  26  17  20   AP   --    --   139*  139*  155*  158*     Culture right leg ulcers  = group C Streptococcus    US abdomen 2/3 = \"Distended gallbladder with gallbladder wall thickening,  pericholecystic fluid and tenderness to probe palpation during exam. Findings  are consistent with acalculus cholecystitis\"    HIDA on  = visualization with filling of the GB, CBD, and small bowel -- GB EF 31%    Assessment:     1. Bilateral venous stasis disease of the LEs +/- cellulitis: Much better     2. RUQ tenderness and abnormal GB US: Clinically, he says the symptoms have been for months or even years. Remains on Zosyn. HIDA shows visualization of the GB with mildly decreased EF of 31%. The pain seems to have resolved     3. OHD -- IHD/CAD; CHF; pulm HTN     4.  CRF with acute exacerbation     5. NIDDM -- new diagnosis     6. Probable COPD -- heavy smoking since age 7      9. Anemia -- HC/MC -- positive family history of colon cancer -- may need GI W/U    Plan:     1. Continue Zosyn -- hope to stop it ~ 2/14      I'll check the patient again on Monday. Please call if problems arise over the weekend.     Jocelyn Leone MD

## 2017-02-11 NOTE — PROGRESS NOTES
[address] saying that the to replace D5 purge bag. Bag replaced per instructions. 1825 now impella saying placement signal lumen blocked. Called the impella rep and he gave advice to troubleshoot over the phone. Minimal blood aspirated when attempted with 20cc syringe. Placement signal now without waveform on impella moitor screen. Purge pressure 459 purge flow 13.6 with flow 2.5. Will continue to monitor. Per Mirtha Veloz RN similar event the other day when impella placed. Agreed to watch for now and see if it corrects itself as it did the other day. CHUNG Lux    2157 relayed to Impella rep that above did not resolve issue.  Dean Posada said that it could be a micro clot in the line but go by other numbers on the monitor such as purge flow, motor current etc. Chung Lux

## 2017-02-11 NOTE — PROGRESS NOTES
Problem: Falls - Risk of  Goal: *Absence of falls  Outcome: Progressing Towards Goal  Compliant with fall prevention measures. Remains safe from falls. Problem: Pressure Ulcer - Risk of  Goal: *Prevention of pressure ulcer  Outcome: Progressing Towards Goal  Turned q 2 hours, pt does not like to turn, treated pain with PRN meds and used pillows for support to make turning more tolerable for pt. Problem: Heart Failure: Day 5  Goal: Activity/Safety  Outcome: Progressing Towards Goal  Pt unable to advance activity d/t having impella in place. Goal: Diagnostic Test/Procedures  Outcome: Progressing Towards Goal  Echo performed at bedside. Goal: Nutrition/Diet  Outcome: Progressing Towards Goal  Pt tolerates diet, reports good appetite. Goal: Medications  Outcome: Progressing Towards Goal  Pt trying to remember his meds and understand their purpose. Goal: Respiratory  Outcome: Progressing Towards Goal  O2 sats remain 90's on 4lpm O2, SVO2 40-60. Goal: Psychosocial  Outcome: Progressing Towards Goal  Reporting frustration with setbacks. Problem: Heart Failure: Discharge Outcomes  Goal: *Demonstrates ability to perform prescribed activity without shortness of breath or discomfort  Outcome: Progressing Towards Goal  Tolerating activity in bed with minor SOA.

## 2017-02-11 NOTE — PROGRESS NOTES
[facility]   [address] [facility], [address] [place] [address], [facility] [place]  Phone: [phone]   [phone]       Nephrology Progress Note  Beatrice Marcial     1950     [id number]  Date of Admission : 1/20/2017 02/11/17    CC: Follow up for CKD/ARF      Assessment and Plan   CHACORTA   - Cr stable post PCI   - Continue Bumex 2 mg TID today as well       Hypokalemia  Po kdur      Hyponatremia :  - combination of  CHF + SIADH from chronic alcoholism  - Na stable    Acute on Chronic Systolic CHF w/ severe CMP  - echo with EF 5-10%, severely dilated LV, severe TR  - 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 Stable  Having bleeding from groin  No sob  No nausea  No fever      Review of Systems: Pertinent items are noted in HPI.     Current Medications:   Current Facility-Administered Medications   Medication Dose Route Frequency    potassium chloride 20 mEq in 50 ml IVPB  20 mEq IntraVENous Q1H    bisacodyl (DULCOLAX) suppository 10 mg  10 mg Rectal DAILY PRN    magnesium hydroxide (MILK OF MAGNESIA) 400 mg/5 mL oral suspension 30 mL  30 mL Oral DAILY PRN    morphine injection 1-2 mg  1-2 mg IntraVENous Q3H PRN    sodium chloride (NS) flush 5-10 mL  5-10 mL IntraVENous Q8H    sodium chloride (NS) flush 5-10 mL  5-10 mL IntraVENous PRN    hydrocortisone Sod Succ (PF) (SOLU-CORTEF) injection 100 mg  100 mg IntraVENous ONCE PRN    nitroglycerin (NITROSTAT) tablet 0.4 mg  0.4 mg SubLINGual Q5MIN PRN    clopidogrel (PLAVIX) tablet 75 mg  75 mg Oral DAILY    bivalirudin (ANGIOMAX) 250 mg in 0.9% sodium chloride (MBP/ADV) 50 mL  0.2-2.5 mg/kg/hr IntraVENous CONTINUOUS    dextrose 5% infusion  14 mL/hr IntraVENous CONTINUOUS    bumetanide (BUMEX) tablet 2 mg  2 mg Oral TID    docusate sodium (COLACE) capsule 100 mg 100 mg Oral DAILY PRN    piperacillin-tazobactam (ZOSYN) 3.375 g in 0.9% sodium chloride (MBP/ADV) 100 mL  3.375 g IntraVENous Q8H    nicotine (NICODERM CQ) 21 mg/24 hr patch 1 Patch  1 Patch TransDERmal DAILY    glimepiride (AMARYL) tablet 4 mg  4 mg Oral ACB    insulin glargine (LANTUS) injection 10 Units  10 Units SubCUTAneous DAILY    gabapentin (NEURONTIN) capsule 100 mg  100 mg Oral BID    milrinone (PRIMACOR) 20 MG/100 ML D5W infusion  0.375 mcg/kg/min IntraVENous CONTINUOUS    carvedilol (COREG) tablet 3.125 mg  3.125 mg Oral BID WITH MEALS    amiodarone (CORDARONE) tablet 200 mg  200 mg Oral Q12H    bacitracin 500 unit/gram packet 1 Packet  1 Packet Topical PRN    atorvastatin (LIPITOR) tablet 10 mg  10 mg Oral DAILY    0.9% sodium chloride infusion  10 mL/hr IntraVENous CONTINUOUS    acetaminophen (TYLENOL) tablet 650 mg  650 mg Oral Q6H PRN    aspirin delayed-release tablet 81 mg  81 mg Oral DAILY    glucagon (GLUCAGEN) injection 1 mg  1 mg IntraMUSCular PRN    glucose chewable tablet 16 g  4 Tab Oral PRN    insulin lispro (HUMALOG) injection   SubCUTAneous AC&HS    magnesium oxide (MAG-OX) tablet 400 mg  400 mg Oral DAILY    0.9% sodium chloride infusion  3 mL/hr IntraVENous CONTINUOUS    sodium chloride (NS) flush 5-10 mL  5-10 mL IntraVENous Q8H    sodium chloride (NS) flush 5-10 mL  5-10 mL IntraVENous PRN    albuterol (PROVENTIL VENTOLIN) nebulizer solution 2.5 mg  2.5 mg Nebulization Q4H PRN    diphenhydrAMINE (BENADRYL) capsule 25 mg  25 mg Oral QHS PRN    oxyCODONE-acetaminophen (PERCOCET) 5-325 mg per tablet 2 Tab  2 Tab Oral Q4H PRN    ELECTROLYTE REPLACEMENT PROTOCOL  1 Each Other PRN      Allergies   Allergen Reactions    Heparin (Porcine) Unknown (comments)       Objective:  Vitals:    Vitals:    02/11/17 0500 02/11/17 0600 02/11/17 0700 02/11/17 0800   BP:       Pulse: 89 89 87 89   Resp: 15 19 15 17   Temp:    98.7 °F (37.1 °C)   SpO2: 95% 95% 96% 96%   Weight: 78.8 kg (173 lb 11.6 oz)     Height:         Intake and Output:     02/09 1901 - 02/11 0700  In: 4508.5 [P.O.:2160; I.V.:2348.5]  Out: 8925 [Urine:8925]    Physical Examination:  General: Appears stated age  Resp:  Lungs CTA  CV:  RRR,  no murmur or rub,+ LE edema  GI:  Soft, NT, + Bowel sounds,  Neurologic:  Non focal  Skin:  RLE cellulitis - resolving   Ext                   Edema +,      []    High complexity decision making was performed  []    Patient is at high-risk of decompensation with multiple organ involvement    Lab Data Personally Reviewed: I have reviewed all the pertinent labs, microbiology data and radiology studies during assessment. Recent Labs      02/11/17   0413  02/10/17   0325  02/09/17   1846 02/09/17   0401   NA  135*  135*  134*  135*   K  3.5  3.6  4.0  3.7   CL  97  99  98  98   CO2  30  27  28  27   GLU  186*  86  133*  109*   BUN  35*  39*  41*  46*   CREA  1.62*  1.78*  1.85*  2.05*   CA  8.5  8.6  8.1*  8.7   MG  2.2  2.3   --   2.2   ALB  2.8*  2.8*  2.8*  2.9*   SGOT  22  30  26  17   ALT  17  19  18  20     Recent Labs      02/11/17   0413  02/11/17   0233  02/10/17   1833  02/10/17   1015  02/10/17   0325  02/09/17   1846 02/09/17   0401   WBC  7.6   --    --    --   5.9  5.7  5.8   HGB  9.2*  9.3*  9.6*  9.2*  9.2*  9.4*  9.8*   HCT  29.8*  30.4*  31.0*  29.8*  30.0*  30.9*  31.7*   PLT  145*   --    --    --   155  155  161     No results found for: SDES  Lab Results   Component Value Date/Time    Culture result: NO GROWTH 5 DAYS 02/05/2017 03:22 AM    Culture result: NO GROWTH 4 DAYS 01/26/2017 03:05 PM    Culture result:  01/25/2017 09:34 PM     LIGHT  STREPTOCOCCI, BETA HEMOLYTIC GROUP C  . .. Penicillin and ampicillin are drugs of choice for treatment of beta-hemolytic streptococcal infections.  Susceptibility testing of penicillins and beta-lactams approved by the FDA for treatment of beta-hemolytic streptococcal infections need not be performed routinely, because nonsusceptible isolates are extremely rare.  CLSI 2012      Culture result: MODERATE  CANDIDA TROPICALIS   01/25/2017 09:34 PM    Culture result: NO SIGNIFICANT GROWTH 01/19/2017 01:35 AM     Recent Results (from the past 24 hour(s))   PTT    Collection Time: 02/10/17 10:15 AM   Result Value Ref Range    aPTT 72.2 (H) 22.1 - 32.5 sec    aPTT, therapeutic range     58.0 - 77.0 SECS   HGB & HCT    Collection Time: 02/10/17 10:15 AM   Result Value Ref Range    HGB 9.2 (L) 12.1 - 17.0 g/dL    HCT 29.8 (L) 36.6 - 50.3 %   LD    Collection Time: 02/10/17 10:15 AM   Result Value Ref Range     (H) 85 - 241 U/L   GLUCOSE, POC    Collection Time: 02/10/17  1:31 PM   Result Value Ref Range    Glucose (POC) 141 (H) 65 - 100 mg/dL    Performed by [facility], POC    Collection Time: 02/10/17  5:44 PM   Result Value Ref Range    Glucose (POC) 88 65 - 100 mg/dL    Performed by Franklyn Esparza    HGB & HCT    Collection Time: 02/10/17  6:33 PM   Result Value Ref Range    HGB 9.6 (L) 12.1 - 17.0 g/dL    HCT 31.0 (L) 36.6 - 50.3 %   LD    Collection Time: 02/10/17  6:33 PM   Result Value Ref Range     (H) 85 - 241 U/L   PTT    Collection Time: 02/10/17  6:33 PM   Result Value Ref Range    aPTT 46.2 (H) 22.1 - 32.5 sec    aPTT, therapeutic range     58.0 - 77.0 SECS   PTT    Collection Time: 02/10/17 10:05 PM   Result Value Ref Range    aPTT 66.4 (H) 22.1 - 32.5 sec    aPTT, therapeutic range     58.0 - 77.0 SECS   GLUCOSE, POC    Collection Time: 02/10/17 10:10 PM   Result Value Ref Range    Glucose (POC) 83 65 - 100 mg/dL    Performed by [address] [place] [address], POC    Collection Time: 02/10/17 11:14 PM   Result Value Ref Range    Glucose (POC) 116 (H) 65 - 100 mg/dL    Performed by Loli Westbrook    HGB & HCT    Collection Time: 02/11/17  2:33 AM   Result Value Ref Range    HGB 9.3 (L) 12.1 - 17.0 g/dL    HCT 30.4 (L) 36.6 - 50.3 %   LD    Collection Time: 02/11/17  2:33 AM   Result Value Ref Range    LD 396 (H) 85 - 241 U/L   PTT    Collection Time: 02/11/17  2:33 AM   Result Value Ref Range    aPTT 78.0 (H) 22.1 - 32.5 sec    aPTT, therapeutic range     58.0 - 19.4 SECS   METABOLIC PANEL, COMPREHENSIVE    Collection Time: 02/11/17  4:13 AM   Result Value Ref Range    Sodium 135 (L) 136 - 145 mmol/L    Potassium 3.5 3.5 - 5.1 mmol/L    Chloride 97 97 - 108 mmol/L    CO2 30 21 - 32 mmol/L    Anion gap 8 5 - 15 mmol/L    Glucose 186 (H) 65 - 100 mg/dL    BUN 35 (H) 6 - 20 MG/DL    Creatinine 1.62 (H) 0.70 - 1.30 MG/DL    BUN/Creatinine ratio 22 (H) 12 - 20      GFR est AA 52 (L) >60 ml/min/1.73m2    GFR est non-AA 43 (L) >60 ml/min/1.73m2    Calcium 8.5 8.5 - 10.1 MG/DL    Bilirubin, total 0.7 0.2 - 1.0 MG/DL    ALT (SGPT) 17 12 - 78 U/L    AST (SGOT) 22 15 - 37 U/L    Alk.  phosphatase 118 (H) 45 - 117 U/L    Protein, total 6.5 6.4 - 8.2 g/dL    Albumin 2.8 (L) 3.5 - 5.0 g/dL    Globulin 3.7 2.0 - 4.0 g/dL    A-G Ratio 0.8 (L) 1.1 - 2.2     MAGNESIUM    Collection Time: 02/11/17  4:13 AM   Result Value Ref Range    Magnesium 2.2 1.6 - 2.4 mg/dL   CBC W/O DIFF    Collection Time: 02/11/17  4:13 AM   Result Value Ref Range    WBC 7.6 4.1 - 11.1 K/uL    RBC 3.83 (L) 4.10 - 5.70 M/uL    HGB 9.2 (L) 12.1 - 17.0 g/dL    HCT 29.8 (L) 36.6 - 50.3 %    MCV 77.8 (L) 80.0 - 99.0 FL    MCH 24.0 (L) 26.0 - 34.0 PG    MCHC 30.9 30.0 - 36.5 g/dL    RDW 17.0 (H) 11.5 - 14.5 %    PLATELET 873 (L) 056 - 400 K/uL   PTT    Collection Time: 02/11/17  4:13 AM   Result Value Ref Range    aPTT 68.8 (H) 22.1 - 32.5 sec    aPTT, therapeutic range     58.0 - 77.0 SECS   POC VENOUS BLOOD GAS    Collection Time: 02/11/17  4:49 AM   Result Value Ref Range    Device: NASAL CANNULA      Flow rate (POC) 3 L/M    FIO2 (POC) 32 %    pH, venous (POC) 7.400 7.32 - 7.42      pCO2, venous (POC) 48.8 41 - 51 MMHG    pO2, venous (POC) 32 25 - 40 mmHg    HCO3, venous (POC) 30.3 (H) 23.0 - 28.0 MMOL/L    sO2, venous (POC) 60 (L) 65 - 88 %    Base excess, venous (POC) 5 mmol/L    Allens test (POC) N/A      Total resp. rate 16      Site AN TAVIA      Specimen type (POC) MIXED VENOUS     PTT    Collection Time: 02/11/17  8:14 AM   Result Value Ref Range    aPTT 71.7 (H) 22.1 - 32.5 sec    aPTT, therapeutic range     58.0 - 77.0 SECS   GLUCOSE, POC    Collection Time: 02/11/17  8:17 AM   Result Value Ref Range    Glucose (POC) 103 (H) 65 - 100 mg/dL    Performed by Lucio Vee I have reviewed the flowsheets. Chart and Pertinent Notes have been reviewed. No change in PMH ,family and social history from Consult note.       Olga Walden MD

## 2017-02-11 NOTE — PROGRESS NOTES
Bedside and Verbal shift change report given to Herrera Johnston RN (oncoming nurse) by Hany Kramer (offgoing nurse).  Report included the following information SBAR, Kardex, Procedure Summary, Intake/Output, MAR, Recent Results and Cardiac Rhythm SR.

## 2017-02-11 NOTE — PROGRESS NOTES
15:00 - 15:35    S/P high risk PCI for  of RCA and left main stent with Impella support. Resting heart on Impella support. Will try to pull Impella early next week. Had several BMs yesterday so he feels much more comfortable. Pulled PA catheter this am due to frayed cable. CVP in the 12-15 range (PICC line). Creatinine down to 1.62. Alkaline phosphatase down to 118. EF still 5-10% on ECHO yesterday. Resting comfortably this afternoon. Neuro - A and O x 3     Cardiovascular - acute on chronic systolic heart (NYHA class IV, stage D); EF 5-10%; RV failure improved;  Impella at P6; milrinone at 0.375; PO Bumex 2 mg BID; spironolactone will start tomorrow; extremity with Impella is warm      Pulmonary - severe COPD (FEV-1 51% predicted); pulmonary HTN; no acute problems     Renal - acute on chronic renal insufficiency (stage III CKD); creatinine 1.62; has had good diuresis so lowering diuretic dose     Intake/Output Summary (Last 24 hours) at 02/11/17 1528  Last data filed at 02/11/17 1300   Gross per 24 hour   Intake          2389.91 ml   Output             6925 ml   Net         -4535.09 ml     Is negative 1500 cc since this am.    GI - acalculous cholecystitis / biliary akinesia; on Zosyn ; alkaline phosphatase 118; minimal abdominal pain today; + several BMs yesterday.       Heme - + CECILIA; Hgb 9.1, platelets 920; on bivalirudin for Impella; ASA / plavix for stents     ID - Zosyn for acalculous cholecystitis; WBC 7.6; mild lower extremity cellulitis; 1-2+ lower extremity edema      F/E - hyponatremia (Na 135) likely due to diuresis; hypokalemia (K 3.5) on replacement protocol     Nutrition - tolerating a normal diet     Endocrine - DM; DTC following       Total critical care time - 35 minutes (CPT 89780)

## 2017-02-11 NOTE — PROGRESS NOTES
Problem: Heart Failure: Day 5  Goal: Off Pathway (Use only if patient is Off Pathway)  Outcome: Not Progressing Towards Goal  Patient continues on Milrinone @ 0.375 mg /kg/hr and Impella @ P6 . Cardiac output and Cardiac index WNL. Continues to diuresis from Bumex 2mg po x3 daily.

## 2017-02-11 NOTE — PROGRESS NOTES
Problem: Diabetes Self-Management  Goal: *Disease process and treatment process  Define diabetes and identify own type of diabetes; list 3 options for treating diabetes.    Outcome: Progressing Towards Goal  Talked about sugar on frosted flakes could effect sugar        Problem: Pressure Ulcer - Risk of  Goal: *Prevention of pressure ulcer  Outcome: Progressing Towards Goal  No pressure ulcer noted

## 2017-02-12 ENCOUNTER — APPOINTMENT (OUTPATIENT)
Dept: GENERAL RADIOLOGY | Age: 67
DRG: 215 | End: 2017-02-12
Attending: PHYSICIAN ASSISTANT
Payer: SELF-PAY

## 2017-02-12 LAB
ALBUMIN SERPL BCP-MCNC: 2.9 G/DL (ref 3.5–5)
ALBUMIN/GLOB SERPL: 0.7 {RATIO} (ref 1.1–2.2)
ALP SERPL-CCNC: 120 U/L (ref 45–117)
ALT SERPL-CCNC: 18 U/L (ref 12–78)
ANION GAP BLD CALC-SCNC: 10 MMOL/L (ref 5–15)
APTT PPP: 73.4 SEC (ref 22.1–32.5)
APTT PPP: 76.7 SEC (ref 22.1–32.5)
AST SERPL W P-5'-P-CCNC: 26 U/L (ref 15–37)
BILIRUB SERPL-MCNC: 0.7 MG/DL (ref 0.2–1)
BUN SERPL-MCNC: 32 MG/DL (ref 6–20)
BUN/CREAT SERPL: 20 (ref 12–20)
CALCIUM SERPL-MCNC: 8.5 MG/DL (ref 8.5–10.1)
CHLORIDE SERPL-SCNC: 97 MMOL/L (ref 97–108)
CO2 SERPL-SCNC: 28 MMOL/L (ref 21–32)
CREAT SERPL-MCNC: 1.62 MG/DL (ref 0.7–1.3)
ERYTHROCYTE [DISTWIDTH] IN BLOOD BY AUTOMATED COUNT: 17.2 % (ref 11.5–14.5)
GLOBULIN SER CALC-MCNC: 3.9 G/DL (ref 2–4)
GLUCOSE BLD STRIP.AUTO-MCNC: 112 MG/DL (ref 65–100)
GLUCOSE BLD STRIP.AUTO-MCNC: 167 MG/DL (ref 65–100)
GLUCOSE BLD STRIP.AUTO-MCNC: 202 MG/DL (ref 65–100)
GLUCOSE BLD STRIP.AUTO-MCNC: 226 MG/DL (ref 65–100)
GLUCOSE SERPL-MCNC: 143 MG/DL (ref 65–100)
HCT VFR BLD AUTO: 28 % (ref 36.6–50.3)
HCT VFR BLD AUTO: 28.9 % (ref 36.6–50.3)
HCT VFR BLD AUTO: 30 % (ref 36.6–50.3)
HGB BLD-MCNC: 8.5 G/DL (ref 12.1–17)
HGB BLD-MCNC: 9 G/DL (ref 12.1–17)
HGB BLD-MCNC: 9.3 G/DL (ref 12.1–17)
LDH SERPL L TO P-CCNC: 431 U/L (ref 85–241)
LDH SERPL L TO P-CCNC: 444 U/L (ref 85–241)
LDH SERPL L TO P-CCNC: 455 U/L (ref 85–241)
MAGNESIUM SERPL-MCNC: 2.1 MG/DL (ref 1.6–2.4)
MCH RBC QN AUTO: 24 PG (ref 26–34)
MCHC RBC AUTO-ENTMCNC: 31 G/DL (ref 30–36.5)
MCV RBC AUTO: 77.5 FL (ref 80–99)
PLATELET # BLD AUTO: 142 K/UL (ref 150–400)
POTASSIUM SERPL-SCNC: 3.5 MMOL/L (ref 3.5–5.1)
PROT SERPL-MCNC: 6.8 G/DL (ref 6.4–8.2)
RBC # BLD AUTO: 3.87 M/UL (ref 4.1–5.7)
SERVICE CMNT-IMP: ABNORMAL
SODIUM SERPL-SCNC: 135 MMOL/L (ref 136–145)
THERAPEUTIC RANGE,PTTT: ABNORMAL SECS (ref 58–77)
THERAPEUTIC RANGE,PTTT: ABNORMAL SECS (ref 58–77)
WBC # BLD AUTO: 8.7 K/UL (ref 4.1–11.1)

## 2017-02-12 PROCEDURE — 74011636637 HC RX REV CODE- 636/637: Performed by: PHYSICIAN ASSISTANT

## 2017-02-12 PROCEDURE — 74011000258 HC RX REV CODE- 258: Performed by: SPECIALIST

## 2017-02-12 PROCEDURE — 82962 GLUCOSE BLOOD TEST: CPT

## 2017-02-12 PROCEDURE — 74011250636 HC RX REV CODE- 250/636: Performed by: PHYSICIAN ASSISTANT

## 2017-02-12 PROCEDURE — 85730 THROMBOPLASTIN TIME PARTIAL: CPT | Performed by: THORACIC SURGERY (CARDIOTHORACIC VASCULAR SURGERY)

## 2017-02-12 PROCEDURE — 71010 XR CHEST PORT: CPT

## 2017-02-12 PROCEDURE — 74011250637 HC RX REV CODE- 250/637: Performed by: SPECIALIST

## 2017-02-12 PROCEDURE — 85027 COMPLETE CBC AUTOMATED: CPT | Performed by: PHYSICIAN ASSISTANT

## 2017-02-12 PROCEDURE — 83735 ASSAY OF MAGNESIUM: CPT | Performed by: PHYSICIAN ASSISTANT

## 2017-02-12 PROCEDURE — 74011250637 HC RX REV CODE- 250/637: Performed by: INTERNAL MEDICINE

## 2017-02-12 PROCEDURE — 74011000258 HC RX REV CODE- 258: Performed by: PHYSICIAN ASSISTANT

## 2017-02-12 PROCEDURE — P9047 ALBUMIN (HUMAN), 25%, 50ML: HCPCS | Performed by: THORACIC SURGERY (CARDIOTHORACIC VASCULAR SURGERY)

## 2017-02-12 PROCEDURE — 74011250636 HC RX REV CODE- 250/636: Performed by: THORACIC SURGERY (CARDIOTHORACIC VASCULAR SURGERY)

## 2017-02-12 PROCEDURE — 83615 LACTATE (LD) (LDH) ENZYME: CPT | Performed by: PHYSICIAN ASSISTANT

## 2017-02-12 PROCEDURE — 65610000003 HC RM ICU SURGICAL

## 2017-02-12 PROCEDURE — 74011250637 HC RX REV CODE- 250/637: Performed by: NURSE PRACTITIONER

## 2017-02-12 PROCEDURE — 77010033678 HC OXYGEN DAILY

## 2017-02-12 PROCEDURE — 74011250636 HC RX REV CODE- 250/636: Performed by: INTERNAL MEDICINE

## 2017-02-12 PROCEDURE — 77030011256 HC DRSG MEPILEX <16IN NO BORD MOLN -A

## 2017-02-12 PROCEDURE — 80053 COMPREHEN METABOLIC PANEL: CPT | Performed by: PHYSICIAN ASSISTANT

## 2017-02-12 PROCEDURE — 36415 COLL VENOUS BLD VENIPUNCTURE: CPT | Performed by: PHYSICIAN ASSISTANT

## 2017-02-12 PROCEDURE — 85018 HEMOGLOBIN: CPT | Performed by: PHYSICIAN ASSISTANT

## 2017-02-12 PROCEDURE — 74011636637 HC RX REV CODE- 636/637: Performed by: NURSE PRACTITIONER

## 2017-02-12 PROCEDURE — 74011250636 HC RX REV CODE- 250/636: Performed by: NURSE PRACTITIONER

## 2017-02-12 PROCEDURE — 85730 THROMBOPLASTIN TIME PARTIAL: CPT | Performed by: PHYSICIAN ASSISTANT

## 2017-02-12 PROCEDURE — 74011250637 HC RX REV CODE- 250/637: Performed by: PHYSICIAN ASSISTANT

## 2017-02-12 RX ORDER — ALBUMIN HUMAN 250 G/1000ML
12.5 SOLUTION INTRAVENOUS ONCE
Status: COMPLETED | OUTPATIENT
Start: 2017-02-12 | End: 2017-02-12

## 2017-02-12 RX ORDER — IBUPROFEN 200 MG
1 TABLET ORAL DAILY
Status: DISCONTINUED | OUTPATIENT
Start: 2017-02-12 | End: 2017-02-22 | Stop reason: HOSPADM

## 2017-02-12 RX ORDER — MILRINONE LACTATE 0.2 MG/ML
0.38 INJECTION, SOLUTION INTRAVENOUS CONTINUOUS
Status: DISCONTINUED | OUTPATIENT
Start: 2017-02-12 | End: 2017-02-22 | Stop reason: HOSPADM

## 2017-02-12 RX ORDER — POTASSIUM CHLORIDE 29.8 MG/ML
20 INJECTION INTRAVENOUS
Status: COMPLETED | OUTPATIENT
Start: 2017-02-12 | End: 2017-02-12

## 2017-02-12 RX ADMIN — OXYCODONE HYDROCHLORIDE AND ACETAMINOPHEN 2 TABLET: 5; 325 TABLET ORAL at 23:25

## 2017-02-12 RX ADMIN — CLOPIDOGREL BISULFATE 75 MG: 75 TABLET, FILM COATED ORAL at 08:50

## 2017-02-12 RX ADMIN — ATORVASTATIN CALCIUM 10 MG: 10 TABLET, FILM COATED ORAL at 08:51

## 2017-02-12 RX ADMIN — INSULIN LISPRO 2 UNITS: 100 INJECTION, SOLUTION INTRAVENOUS; SUBCUTANEOUS at 17:56

## 2017-02-12 RX ADMIN — OXYCODONE HYDROCHLORIDE AND ACETAMINOPHEN 2 TABLET: 5; 325 TABLET ORAL at 12:21

## 2017-02-12 RX ADMIN — BIVALIRUDIN 0.37 MG/KG/HR: 250 INJECTION, POWDER, LYOPHILIZED, FOR SOLUTION INTRAVENOUS at 14:01

## 2017-02-12 RX ADMIN — Medication 10 ML: at 14:05

## 2017-02-12 RX ADMIN — GABAPENTIN 100 MG: 100 CAPSULE ORAL at 17:08

## 2017-02-12 RX ADMIN — PIPERACILLIN AND TAZOBACTAM 3.38 G: 3; .375 INJECTION, POWDER, FOR SOLUTION INTRAVENOUS at 22:08

## 2017-02-12 RX ADMIN — OXYCODONE HYDROCHLORIDE AND ACETAMINOPHEN 2 TABLET: 5; 325 TABLET ORAL at 03:12

## 2017-02-12 RX ADMIN — MILRINONE LACTATE 0.38 MCG/KG/MIN: 200 INJECTION, SOLUTION INTRAVENOUS at 17:01

## 2017-02-12 RX ADMIN — BUMETANIDE 2 MG: 1 TABLET ORAL at 08:50

## 2017-02-12 RX ADMIN — POTASSIUM CHLORIDE 20 MEQ: 400 INJECTION, SOLUTION INTRAVENOUS at 08:49

## 2017-02-12 RX ADMIN — Medication 400 MG: at 08:51

## 2017-02-12 RX ADMIN — BIVALIRUDIN 0.37 MG/KG/HR: 250 INJECTION, POWDER, LYOPHILIZED, FOR SOLUTION INTRAVENOUS at 23:28

## 2017-02-12 RX ADMIN — AMIODARONE HYDROCHLORIDE 200 MG: 200 TABLET ORAL at 08:51

## 2017-02-12 RX ADMIN — INSULIN LISPRO 2 UNITS: 100 INJECTION, SOLUTION INTRAVENOUS; SUBCUTANEOUS at 12:21

## 2017-02-12 RX ADMIN — INSULIN GLARGINE 10 UNITS: 100 INJECTION, SOLUTION SUBCUTANEOUS at 08:51

## 2017-02-12 RX ADMIN — OXYCODONE HYDROCHLORIDE AND ACETAMINOPHEN 2 TABLET: 5; 325 TABLET ORAL at 08:54

## 2017-02-12 RX ADMIN — OXYCODONE HYDROCHLORIDE AND ACETAMINOPHEN 2 TABLET: 5; 325 TABLET ORAL at 19:36

## 2017-02-12 RX ADMIN — SODIUM CHLORIDE 3 ML/HR: 900 INJECTION, SOLUTION INTRAVENOUS at 19:51

## 2017-02-12 RX ADMIN — Medication 81 MG: at 08:50

## 2017-02-12 RX ADMIN — SPIRONOLACTONE 25 MG: 25 TABLET, FILM COATED ORAL at 08:51

## 2017-02-12 RX ADMIN — POTASSIUM CHLORIDE 20 MEQ: 400 INJECTION, SOLUTION INTRAVENOUS at 09:54

## 2017-02-12 RX ADMIN — GABAPENTIN 100 MG: 100 CAPSULE ORAL at 08:50

## 2017-02-12 RX ADMIN — BUMETANIDE 2 MG: 1 TABLET ORAL at 22:08

## 2017-02-12 RX ADMIN — PIPERACILLIN AND TAZOBACTAM 3.38 G: 3; .375 INJECTION, POWDER, FOR SOLUTION INTRAVENOUS at 14:01

## 2017-02-12 RX ADMIN — DEXTROSE MONOHYDRATE 14 ML/HR: 5 INJECTION, SOLUTION INTRAVENOUS at 10:06

## 2017-02-12 RX ADMIN — CARVEDILOL 3.12 MG: 3.12 TABLET, FILM COATED ORAL at 17:08

## 2017-02-12 RX ADMIN — Medication 10 ML: at 14:00

## 2017-02-12 RX ADMIN — ALBUMIN (HUMAN) 12.5 G: 0.25 INJECTION, SOLUTION INTRAVENOUS at 10:02

## 2017-02-12 RX ADMIN — AMIODARONE HYDROCHLORIDE 200 MG: 200 TABLET ORAL at 22:08

## 2017-02-12 RX ADMIN — PIPERACILLIN AND TAZOBACTAM 3.38 G: 3; .375 INJECTION, POWDER, FOR SOLUTION INTRAVENOUS at 06:14

## 2017-02-12 RX ADMIN — CARVEDILOL 3.12 MG: 3.12 TABLET, FILM COATED ORAL at 08:50

## 2017-02-12 RX ADMIN — Medication 10 ML: at 23:22

## 2017-02-12 RX ADMIN — MILRINONE LACTATE 0.38 MCG/KG/MIN: 200 INJECTION, SOLUTION INTRAVENOUS at 04:44

## 2017-02-12 RX ADMIN — BUMETANIDE 2 MG: 1 TABLET ORAL at 17:03

## 2017-02-12 RX ADMIN — GLIMEPIRIDE 4 MG: 2 TABLET ORAL at 08:51

## 2017-02-12 RX ADMIN — BIVALIRUDIN 0.37 MG/KG/HR: 250 INJECTION, POWDER, LYOPHILIZED, FOR SOLUTION INTRAVENOUS at 08:43

## 2017-02-12 NOTE — PROGRESS NOTES
[address]     NAME:Fernanda Fleming. OEW:[id number]   SQT:09/44/4547   -   Labs reviewed. I/o net negative  Cr. Stable  No new suggestions. Nargis Walker, [address] [facility]  [facility]  [address]enn[address], Joy Lucie Ernstu, [address] [facility]  Phone - [phone]         Fax - [phone] [facility] Office  [address] [place] [address]  Phone - [phone]        Fax - [phone]     [email or web address]

## 2017-02-12 NOTE — PROGRESS NOTES
[facility] Progress Note      NAME:  Fernnada Bonner. :   1950   MRN:   [id number]   PCP:  None  CARD:   Dr. Coleen Brand    Date:  2017     Fernanda Burnett is a 61 y.o. male with a who presented for further evaluation of severe CAD and systolic heart failure. Subjective:   He was initially seen at [address] [place] [address] 3 years ago and told that he had heart failure with LVEF 30%. He was lost to follow up due to lack of insurance and has not been seen by a physician in the interim. He complains of progressive fatigue, NAVARRO, PND, and edema over the past year, prompting presentation to [facility]. He also complained of lower extremity bullae, weeping, and pain. He denied palpitations, presyncope, syncope, or chest pain. Past 24 hours:  Impella supported PCI to  of RCA and Left Main - hemodynamically stable  Impella in place at P6 - 2.5 Liters   [place] removed yesterday, CVP 8-9  Feels much better  Diuresed well yesterday    Objective:     Visit Vitals    BP 96/62 (BP 1 Location: Right arm, BP Patient Position: At rest)    Pulse 84    Temp 98.1 °F (36.7 °C)    Resp 18    Ht 5' 9\" (1.753 m)    Wt 75.8 kg (167 lb 1.7 oz)    SpO2 98%    BMI 24.68 kg/m2      Hemodynamics:  CVP 8-9 mmHg    General:  fatigued    HEENT: Normocephalic, EOMI, PERRLA, Hearing intact, trachea mid-line    Neck:  supple, no significant adenopathy, carotids upstroke normal bilaterally, no bruits    CVP:  8 cm      Heart:  Diminished PMI    Normal S1 and S2, S3 gallop    Impella at P6 - Flow 2.5 L    Murmur: 1/6 systolic murmur    Lungs: Diminished breath sounds left lower lung    Abdomen: soft, non-tender. Bowel sounds normal. +HSM    Extremity: Trace edema bilaterally    Neuro: Alert and oriented to person, place, and time; normal strength and tone. Normal symmetric reflexes. Normal coordination and gait      02/10 1901 -  0700  In: 4038.7 [P.O.:1640;  I.V.:2398.7]  Out: 9800 [Urine:9800]  O2 Flow Rate (L/min): 3 l/min O2 Device: Nasal cannula  Temp (24hrs), Av.2 °F (36.8 °C), Min:98 °F (36.7 °C), Max:98.7 °F (37.1 °C)          Care Plan discussed with:    Comments   Patient x    Family      RN x    Care Manager                    Consultant:          Past History:     Past Medical History   Diagnosis Date    Cardiomyopathy ([address] 2017     A. Echo (17):  EF 5-10% with severe GHK,. Mildly dil LA. Mild TR. PASP 46. No past surgical history on file. Social History   Substance Use Topics    Smoking status: Not on file    Smokeless tobacco: Not on file    Alcohol use Not on file        No family history on file. Allergies: Allergies   Allergen Reactions    Heparin (Porcine) Unknown (comments)          Data Review:     CXR:   CXR Results  (Last 48 hours)               17 0441  XR CHEST PORT Final result    Impression:  Impression: No interval change. Narrative: Indication: Follow-up abnormal chest x-ray, pleural effusion       Comparison to 2017. Portable exam obtained at 359 demonstrates little   change in the left pleural effusion and underlying consolidation compared to   prior examination. 17 0423  XR CHEST PORT Final result    Impression:  IMPRESSION:        Slight improvement in left basilar fluid and atelectasis. Narrative:  EXAM:  XR CHEST PORT       INDICATION:  impella, CHF,       COMPARISON:  2/10/2017       FINDINGS:       A portable AP radiograph of the chest was obtained at 355 hours. The tip of the   leg PICC line remains in the region of the SVC. There is a catheter ascending   from the IVC the tip of which is in the region of the right hilar pulmonary   artery. There has been slight interval improvement in left basilar pleural   fluid and atelectasis. The right lung remains clear. The cardiomediastinal   silhouette is unchanged.                    Echocardiogram:   Echo Results  (Last 48 hours)               02/10/17 1349  2D ECHO COMPLETE ADULT (TTE) W OR WO CONTR Final result    Narrative:  Dar[address] Th[address]   [address], [address]   [phone]       Transthoracic Echocardiogram       Patient: Jorge L Grover   MRN: [id number]   ACCT #: [de-identified]   : 1950   Age: 61 years   Gender: Male   Height: 69 in   Weight: 189.6 lb   BSA: 2.02 m squared   BP: 112 / 78 mmHg   Study date: 10-Feb-2017   Status: Routine   Location: CVI [facility]   [facility] ACC #: [id number]       Allergies: HEPARIN (PORCINE)       Ordering Physician:  Esme Florence. Stephanie Wills MD   Reading Group:  *CAV Group   Technologist:  Loi Esparza   Reading Physician:  Mary Gutierrez MD       SUMMARY:   Left ventricle: What appears to be an Impella catheter is seen crossing   the aortic valve and ending in the LV cavity with continous flow by   Doppler out the LV outflow tract. The ventricle was moderately dilated. Systolic function was severely reduced. Ejection fraction was estimated to   be 10 %. There was severe diffuse hypokinesis. Left atrium: The atrium was moderately dilated. Mitral valve: There was moderate to severe regurgitation. Tricuspid valve: There was mild to moderate regurgitation with mildly   increased velocity suggesting RV systolic pressure about 03NKS above RA. INDICATIONS: Re eval impelia       PROCEDURE: This was a routine study. The study included complete 2D   imaging, complete spectral Doppler, and color Doppler. The heart rate was   81 bpm, at the start of the study. Systolic blood pressure was 112 mmHg,   at the start of the study. Diastolic blood pressure was 78 mmHg, at the   start of the study. LEFT VENTRICLE: What appears to be an Impella catheter is seen crossing   the aortic valve and ending in the LV cavity with continous flow by   Doppler out the LV outflow tract. The ventricle was moderately dilated. Systolic function was severely reduced.  Ejection fraction was estimated to   be 10 %. There was severe diffuse hypokinesis. Wall thickness was normal.       RIGHT VENTRICLE: The size was normal. Systolic function was normal. Wall   thickness was normal. A catheter was present. LEFT ATRIUM: The atrium was moderately dilated. RIGHT ATRIUM: Size was normal. A catheter was present. MITRAL VALVE: There was mild thickening. DOPPLER: There was moderate to   severe regurgitation. AORTIC VALVE: Not well visualized. TRICUSPID VALVE: Normal valve structure. DOPPLER: There was mild to   moderate regurgitation with mildly increased velocity suggesting RV   systolic pressure about 60FGN above RA. PULMONIC VALVE: Not well visualized. AORTA: The root exhibited normal size. PERICARDIUM: There was no pericardial effusion. The pericardium was normal   in appearance.        MEASUREMENT TABLES       M-mode measurements   Left atrium   (Reference normals)   AP dim   46 mm   (19-40)       SYSTEM MEASUREMENT TABLES       2D   Ao Diam: 3.94 cm   LA Diam: 4.6 cm   LAAs A2C: 38.06 cm2   LAAs A4C: 38.34 cm2   LAESV A-L A2C: 169.29 ml   LAESV A-L A4C: 173.45 ml   LAESV Index (A-L): 85.23 ml/m2   LAESV MOD A2C: 164.13 ml   LAESV MOD A4C: 158.79 ml   LAESV(A-L): 172.16 ml   LALs A2C: 7.26 cm   LALs A4C: 7.19 cm   %FS: 10.06 %   EDV(Teich): 212.15 ml   EF(Teich): 21.49 %   ESV(Teich): 166.56 ml   IVSd: 0.81 cm   LVIDd: 6.45 cm   LVIDs: 5.8 cm   LVPWd: 0.69 cm   SV(Teich): 45.59 ml   RA Diam: 6.09 cm   RVIDd: 4.12 cm       CW   AV Vmax: 0.86 m/s   AV maxP.96 mmHg   TR Vmax: 3.09 m/s   TR maxP.2 mmHg       PW   E' Lat: 0.09 m/s   E' Sept: 0.05 m/s   E/E' Lat: 11.9   E/E' Sept: 19.33   MV A Glenn: 0.72 m/s   MV Dec Banner: 4.75 m/s2   MV DecT: 213.2 ms   MV E Glenn: 1.01 m/s   MV E/A Ratio: 1.41   MV PHT: 61.83 ms   MVA By PHT: 3.56 cm2       Prepared and E-signed by       Saturnino Moore MD   Signed 10-Feb-2017 16:24:30                 No results found for this visit on 01/20/17. ECG:  EKG: ST with occasional PVC, NSIVCD, LAE    LABS:  Recent Results (from the past 24 hour(s))   HGB & HCT    Collection Time: 02/11/17 11:50 AM   Result Value Ref Range    HGB 9.1 (L) 12.1 - 17.0 g/dL    HCT 29.3 (L) 36.6 - 50.3 %   LD    Collection Time: 02/11/17 11:50 AM   Result Value Ref Range     (H) 85 - 241 U/L   PTT    Collection Time: 02/11/17 11:53 AM   Result Value Ref Range    aPTT 72.3 (H) 22.1 - 32.5 sec    aPTT, therapeutic range     58.0 - 77.0 SECS   GLUCOSE, POC    Collection Time: 02/11/17 11:55 AM   Result Value Ref Range    Glucose (POC) 159 (H) 65 - 100 mg/dL    Performed by Lupe Frances    GLUCOSE, POC    Collection Time: 02/11/17  6:26 PM   Result Value Ref Range    Glucose (POC) 132 (H) 65 - 100 mg/dL    Performed by Lupe Frances    HGB & HCT    Collection Time: 02/11/17  8:00 PM   Result Value Ref Range    HGB 9.2 (L) 12.1 - 17.0 g/dL    HCT 29.7 (L) 36.6 - 50.3 %   LD    Collection Time: 02/11/17  8:00 PM   Result Value Ref Range     (H) 85 - 241 U/L   GLUCOSE, POC    Collection Time: 02/11/17  9:48 PM   Result Value Ref Range    Glucose (POC) 160 (H) 65 - 100 mg/dL    Performed by Marli Iyer     PTT    Collection Time: 02/12/17  2:04 AM   Result Value Ref Range    aPTT 73.4 (H) 22.1 - 32.5 sec    aPTT, therapeutic range     58.0 - 58.4 SECS   METABOLIC PANEL, COMPREHENSIVE    Collection Time: 02/12/17  4:18 AM   Result Value Ref Range    Sodium 135 (L) 136 - 145 mmol/L    Potassium 3.5 3.5 - 5.1 mmol/L    Chloride 97 97 - 108 mmol/L    CO2 28 21 - 32 mmol/L    Anion gap 10 5 - 15 mmol/L    Glucose 143 (H) 65 - 100 mg/dL    BUN 32 (H) 6 - 20 MG/DL    Creatinine 1.62 (H) 0.70 - 1.30 MG/DL    BUN/Creatinine ratio 20 12 - 20      GFR est AA 52 (L) >60 ml/min/1.73m2    GFR est non-AA 43 (L) >60 ml/min/1.73m2    Calcium 8.5 8.5 - 10.1 MG/DL    Bilirubin, total 0.7 0.2 - 1.0 MG/DL    ALT (SGPT) 18 12 - 78 U/L    AST (SGOT) 26 15 - 37 U/L    Alk.  phosphatase 120 (H) 45 - 117 U/L    Protein, total 6.8 6.4 - 8.2 g/dL    Albumin 2.9 (L) 3.5 - 5.0 g/dL    Globulin 3.9 2.0 - 4.0 g/dL    A-G Ratio 0.7 (L) 1.1 - 2.2     MAGNESIUM    Collection Time: 02/12/17  4:18 AM   Result Value Ref Range    Magnesium 2.1 1.6 - 2.4 mg/dL   CBC W/O DIFF    Collection Time: 02/12/17  4:18 AM   Result Value Ref Range    WBC 8.7 4.1 - 11.1 K/uL    RBC 3.87 (L) 4.10 - 5.70 M/uL    HGB 9.3 (L) 12.1 - 17.0 g/dL    HCT 30.0 (L) 36.6 - 50.3 %    MCV 77.5 (L) 80.0 - 99.0 FL    MCH 24.0 (L) 26.0 - 34.0 PG    MCHC 31.0 30.0 - 36.5 g/dL    RDW 17.2 (H) 11.5 - 14.5 %    PLATELET 019 (L) 256 - 400 K/uL   LD    Collection Time: 02/12/17  4:18 AM   Result Value Ref Range     (H) 85 - 241 U/L   GLUCOSE, POC    Collection Time: 02/12/17  8:38 AM   Result Value Ref Range    Glucose (POC) 167 (H) 65 - 100 mg/dL    Performed by Mickey Pedro      All Micro Results     Procedure Component Value Units Date/Time    CULTURE, BLOOD, PAIRED [[id number]] Collected:  02/05/17 0322    Order Status:  Completed Specimen:  Blood Updated:  02/10/17 0542     Special Requests: NO SPECIAL REQUESTS        Culture result: NO GROWTH 5 DAYS       CULTURE, BODY FLUID Car Agosto STAIN [[id number]] Collected:  01/26/17 1505    Order Status:  Completed Specimen:  Thoracentesis Updated:  01/30/17 1057     Special Requests: NO SPECIAL REQUESTS        GRAM STAIN OCCASIONAL  WBCS SEEN         NO ORGANISMS SEEN        Culture result: NO GROWTH 4 DAYS       CULTURE, Franco Diana STAIN [[id number]] Collected:  01/25/17 2134    Order Status:  Completed Specimen:  Leg Updated:  01/27/17 1440     Special Requests: NO SPECIAL REQUESTS        GRAM STAIN RARE  WBCS SEEN         NO ORGANISMS SEEN        Culture result: LIGHT  STREPTOCOCCI, BETA HEMOLYTIC GROUP C  . .. Penicillin and ampicillin are drugs of choice for treatment of beta-hemolytic streptococcal infections.  Susceptibility testing of penicillins and beta-lactams approved by the FDA for treatment of beta-hemolytic streptococcal infections need not be performed routinely, because nonsusceptible isolates are extremely rare. CLSI 2012         MODERATE  BETTIE TROPICALIS           Abdominal Ultrasound 2/4/17  IMPRESSION: Distended gallbladder with gallbladder wall thickening,  pericholecystic fluid and tenderness to probe palpation during exam. Findings  are consistent with acalculus cholecystitis. Thoracentesis 1/26/17  Specimen Source   1: Pleural Fluid   CYTOLOGIC INTERPRETATION:   Scattered mesothelial cells with degenerative changes and mixed inflammatory cells   General Categorization   No cells diagnostic for malignancy   Specimen Adequacy   Satisfactory for evaluation       Cardiac MRI 1/31/17  IMPRESSION  1. Markedly dilated left ventricle with 3-D end-diastolic volume index to body  surface area of 153 mL/sq m. Mild eccentric left ventricular hypertrophy with  3-D mass index to body surface area of 85 g/sq m. Severe left ventricular  systolic dysfunction. Severe global hypokinesis with regional variation. 3-D  LVEF 10%. 2. Moderately dilated right ventricle by 3-D volumetric assessment. RV end  diastolic volume indexed to body surface area of 138 mL/sq m. Moderate to severe  right ventricular systolic dysfunction. Global hypokinesis. 3-D RVEF 25%. 3. Apical a tethered mitral valve leaflets. Mild mitral regurgitation. 4. Moderately severe 3+ tricuspid regurgitation. 5. On LGE study, the anterior wall, anteroseptal wall, anteroapical wall, and  anterior lateral wall are largely viable without significant myocardial  infarction. The entire inferior wall and inferoseptal wall are completely viable  without any infarct. The base to mid inferolateral wall demonstrate a near  transmural greater than 75% thickness infarct with minimal or no viable  myocardium in this territory. The distal inferolateral wall, apical lateral wall  does not demonstrate any infarct and are largely viable.  Based on the viability  imaging, the entire LAD territory is largely viable and should recover upon  revascularization. The entire RCA territory is largely viable and should recover  upon revascularization. The LCx territory demonstrate medium-size infarct with  limited viability. 6. Large left-sided pleural effusion with passive atelectasis of the left lung. 7. Dilated branch pulmonary arteries suggest pulmonary hypertension. 8. Markedly dilated left atrium measuring 59 x 55 mm. Moderately dilated right  atrium measuring 46 x 56 mm.     Medications reviewed:    Current Facility-Administered Medications   Medication Dose Route Frequency    potassium chloride 20 mEq in 50 ml IVPB  20 mEq IntraVENous Q1H    spironolactone (ALDACTONE) tablet 25 mg  25 mg Oral DAILY    bisacodyl (DULCOLAX) suppository 10 mg  10 mg Rectal DAILY PRN    magnesium hydroxide (MILK OF MAGNESIA) 400 mg/5 mL oral suspension 30 mL  30 mL Oral DAILY PRN    morphine injection 1-2 mg  1-2 mg IntraVENous Q3H PRN    sodium chloride (NS) flush 5-10 mL  5-10 mL IntraVENous Q8H    sodium chloride (NS) flush 5-10 mL  5-10 mL IntraVENous PRN    hydrocortisone Sod Succ (PF) (SOLU-CORTEF) injection 100 mg  100 mg IntraVENous ONCE PRN    nitroglycerin (NITROSTAT) tablet 0.4 mg  0.4 mg SubLINGual Q5MIN PRN    clopidogrel (PLAVIX) tablet 75 mg  75 mg Oral DAILY    bivalirudin (ANGIOMAX) 250 mg in 0.9% sodium chloride (MBP/ADV) 50 mL  0.2-2.5 mg/kg/hr IntraVENous CONTINUOUS    dextrose 5% infusion  14 mL/hr IntraVENous CONTINUOUS    bumetanide (BUMEX) tablet 2 mg  2 mg Oral TID    docusate sodium (COLACE) capsule 100 mg  100 mg Oral DAILY PRN    piperacillin-tazobactam (ZOSYN) 3.375 g in 0.9% sodium chloride (MBP/ADV) 100 mL  3.375 g IntraVENous Q8H    nicotine (NICODERM CQ) 21 mg/24 hr patch 1 Patch  1 Patch TransDERmal DAILY    glimepiride (AMARYL) tablet 4 mg  4 mg Oral ACB    insulin glargine (LANTUS) injection 10 Units  10 Units SubCUTAneous DAILY  gabapentin (NEURONTIN) capsule 100 mg  100 mg Oral BID    milrinone (PRIMACOR) 20 MG/100 ML D5W infusion  0.375 mcg/kg/min IntraVENous CONTINUOUS    carvedilol (COREG) tablet 3.125 mg  3.125 mg Oral BID WITH MEALS    amiodarone (CORDARONE) tablet 200 mg  200 mg Oral Q12H    bacitracin 500 unit/gram packet 1 Packet  1 Packet Topical PRN    atorvastatin (LIPITOR) tablet 10 mg  10 mg Oral DAILY    0.9% sodium chloride infusion  10 mL/hr IntraVENous CONTINUOUS    acetaminophen (TYLENOL) tablet 650 mg  650 mg Oral Q6H PRN    aspirin delayed-release tablet 81 mg  81 mg Oral DAILY    glucagon (GLUCAGEN) injection 1 mg  1 mg IntraMUSCular PRN    glucose chewable tablet 16 g  4 Tab Oral PRN    insulin lispro (HUMALOG) injection   SubCUTAneous AC&HS    magnesium oxide (MAG-OX) tablet 400 mg  400 mg Oral DAILY    0.9% sodium chloride infusion  3 mL/hr IntraVENous CONTINUOUS    sodium chloride (NS) flush 5-10 mL  5-10 mL IntraVENous Q8H    sodium chloride (NS) flush 5-10 mL  5-10 mL IntraVENous PRN    albuterol (PROVENTIL VENTOLIN) nebulizer solution 2.5 mg  2.5 mg Nebulization Q4H PRN    diphenhydrAMINE (BENADRYL) capsule 25 mg  25 mg Oral QHS PRN    oxyCODONE-acetaminophen (PERCOCET) 5-325 mg per tablet 2 Tab  2 Tab Oral Q4H PRN    ELECTROLYTE REPLACEMENT PROTOCOL  1 Each Other PRN     HIDA Scan 2/7/17  Gallbladder emptying study was performed per protocol utilizing 5. 2mCi Tc-99 M  Choletec and 1.86 mcg CCK intravenously. There is normal tracer distribution  throughout the liver. Activity is noted in the gallbladder, common bile duct,  and small bowel. The gallbladder ejection fraction is 31% (normal greater than  or equal to 35%).    IMPRESSION: Abnormal gallbladder emptying study with an EF of only 31%.        IMPRESSION / PLAN:    1. CAD - severe native vessel disease, s/p PCI for  of RCA and left main with Impella support   Stable on ASA, clopidogrel, bivalrudin, BB, and statin    2.  Acute on chronic systolic heart failure (ICM with LVEF 5-10%) - Stage D, NYHA Class IV   Currently inotropic dependent   On Iv milrinone 0.375, bumex 2 mg tid, and spironolactone 25 mg daily   Continue Impella support at P6 with plans to remove by Dr. Radha Roberson tomorrow - stable , Hgb 9.3   Readjusted milrinone to reflect new weigh   IV albumin due to asymptomatic hypotension following aggressive diuresis    3. Persistent atrial fibrillation   On IV bivalrudin   Resume warfarin prior to discharge    4. DM2 with Hga1c 13.5   On lantus, SSI, and glimepiride   Diabetic education   Accuchecks    5. Cellutitis   Improving on IV cefazolin    6. CKD3   Serum creatinine improving on inotropic support and Impella   Continue diuresis    7. COPD with FEV1 51% predicted    8. Acalculous CHolecystitis   On IV zosyn     9. Hyponatremia   Reflects severity of heart failure and volume overload    10. Nicotine Addiction   Smoking cessation counseling   Nicotine patch - reduce dose to 14 mg/24 hour    11. CECILIA    Stable on IV bivalirudin    Hgb 9.3, plt 142      Critical care was necessary to treat or prevent imminent or life threatening deterioration of the following conditions: cardiac failure, respiratory failure and CNS failure or compromise    Total Critical Care time spent:  47 minutes. There was no overlap with other services  [id number]    Services Provided:  1. Telemetry review and 12 lead ECG interpretation  2. Hemodynamic interpretation, assessment, and management  3. Review and interpretation of CXR  4. Review and interpretation of lab values  5. Review and interpretation of microbiologic data and culture results  6. Review of medications and administration  7. Review and interpretation of nutrition requirements and management  8. Discussion of management withother consultants and services  9. Clinical update to family members    Cruz Lizbeth.  Janie Borja MD, [facility] - [address] [facility] [place]  [address] [place] [address], [address] [facility], [address] [place] [address]  Office [phone]  Fax [phone]  24 hour VAD/HF Pager: [phone]

## 2017-02-12 NOTE — PROGRESS NOTES
2/11/17 2000 Received report and assumed care of patient.  -Impella 3.5/CP, flow 2.4-2.6 L/m  -Impella catheter at 89 cm.  -Impella placement signal unknown. Abiomed aware and have trouble shooted, without success. Flows within normal range and hemodynamics stable at this time. Will continue to trend placement signal readings and flows. 2/12/17    0400 Left groin oozing blood. Light pressure applied and dressing changed. Bleeding control at this time. 0800 Bedside shift change report given to Karlene Mcrae RN (oncoming nurse) by Samira Tejeda RN (offgoing nurse). Report included the following information SBAR, Intake/Output, MAR, Recent Results and Cardiac Rhythm NSR.

## 2017-02-12 NOTE — PROGRESS NOTES
1202 [facility] here and updated on pt condition. Pt a little hypotensive with BP's in the 70's with maps on the 55 range. Order received to update milrinone dose with todays weight and a to give a small bottle of Albumin. If pressure not better call Dorys and she may start something else.  Shakeel Pérez

## 2017-02-12 NOTE — PROGRESS NOTES
Problem: Heart Failure: Day 5  Goal: Off Pathway (Use only if patient is Off Pathway)  Outcome: Progressing Towards Goal  Discussed activity pacing and body mechanics, but patient remains on bed rest due to Impella. Discussed diet and lifestyle changes and choices. Also discussed importance of daily weights and fluid restriction.

## 2017-02-13 ENCOUNTER — APPOINTMENT (OUTPATIENT)
Dept: GENERAL RADIOLOGY | Age: 67
DRG: 215 | End: 2017-02-13
Attending: PHYSICIAN ASSISTANT
Payer: SELF-PAY

## 2017-02-13 LAB
ALBUMIN SERPL BCP-MCNC: 3 G/DL (ref 3.5–5)
ALBUMIN/GLOB SERPL: 0.8 {RATIO} (ref 1.1–2.2)
ALP SERPL-CCNC: 113 U/L (ref 45–117)
ALT SERPL-CCNC: 19 U/L (ref 12–78)
ANION GAP BLD CALC-SCNC: 8 MMOL/L (ref 5–15)
APTT PPP: 73.5 SEC (ref 22.1–32.5)
APTT PPP: 76.1 SEC (ref 22.1–32.5)
AST SERPL W P-5'-P-CCNC: 24 U/L (ref 15–37)
BILIRUB SERPL-MCNC: 0.7 MG/DL (ref 0.2–1)
BUN SERPL-MCNC: 36 MG/DL (ref 6–20)
BUN/CREAT SERPL: 20 (ref 12–20)
CALCIUM SERPL-MCNC: 8.5 MG/DL (ref 8.5–10.1)
CHLORIDE SERPL-SCNC: 98 MMOL/L (ref 97–108)
CO2 SERPL-SCNC: 29 MMOL/L (ref 21–32)
CREAT SERPL-MCNC: 1.84 MG/DL (ref 0.7–1.3)
ERYTHROCYTE [DISTWIDTH] IN BLOOD BY AUTOMATED COUNT: 17.4 % (ref 11.5–14.5)
GLOBULIN SER CALC-MCNC: 3.9 G/DL (ref 2–4)
GLUCOSE BLD STRIP.AUTO-MCNC: 119 MG/DL (ref 65–100)
GLUCOSE BLD STRIP.AUTO-MCNC: 173 MG/DL (ref 65–100)
GLUCOSE BLD STRIP.AUTO-MCNC: 299 MG/DL (ref 65–100)
GLUCOSE BLD STRIP.AUTO-MCNC: 97 MG/DL (ref 65–100)
GLUCOSE SERPL-MCNC: 86 MG/DL (ref 65–100)
HCT VFR BLD AUTO: 27.8 % (ref 36.6–50.3)
HCT VFR BLD AUTO: 28.2 % (ref 36.6–50.3)
HEMOCCULT STL QL: POSITIVE
HGB BLD-MCNC: 8.7 G/DL (ref 12.1–17)
HGB BLD-MCNC: 8.7 G/DL (ref 12.1–17)
LDH SERPL L TO P-CCNC: 412 U/L (ref 85–241)
LDH SERPL L TO P-CCNC: 432 U/L (ref 85–241)
MAGNESIUM SERPL-MCNC: 2.3 MG/DL (ref 1.6–2.4)
MCH RBC QN AUTO: 24 PG (ref 26–34)
MCHC RBC AUTO-ENTMCNC: 30.9 G/DL (ref 30–36.5)
MCV RBC AUTO: 77.9 FL (ref 80–99)
PLATELET # BLD AUTO: 130 K/UL (ref 150–400)
POTASSIUM SERPL-SCNC: 3.8 MMOL/L (ref 3.5–5.1)
PROT SERPL-MCNC: 6.9 G/DL (ref 6.4–8.2)
RBC # BLD AUTO: 3.62 M/UL (ref 4.1–5.7)
SERVICE CMNT-IMP: ABNORMAL
SERVICE CMNT-IMP: NORMAL
SODIUM SERPL-SCNC: 135 MMOL/L (ref 136–145)
THERAPEUTIC RANGE,PTTT: ABNORMAL SECS (ref 58–77)
THERAPEUTIC RANGE,PTTT: ABNORMAL SECS (ref 58–77)
WBC # BLD AUTO: 8.9 K/UL (ref 4.1–11.1)

## 2017-02-13 PROCEDURE — 74011250637 HC RX REV CODE- 250/637: Performed by: SPECIALIST

## 2017-02-13 PROCEDURE — 85730 THROMBOPLASTIN TIME PARTIAL: CPT | Performed by: THORACIC SURGERY (CARDIOTHORACIC VASCULAR SURGERY)

## 2017-02-13 PROCEDURE — 74011250636 HC RX REV CODE- 250/636: Performed by: THORACIC SURGERY (CARDIOTHORACIC VASCULAR SURGERY)

## 2017-02-13 PROCEDURE — 74011636637 HC RX REV CODE- 636/637: Performed by: NURSE PRACTITIONER

## 2017-02-13 PROCEDURE — 74011250637 HC RX REV CODE- 250/637: Performed by: INTERNAL MEDICINE

## 2017-02-13 PROCEDURE — 74011250637 HC RX REV CODE- 250/637: Performed by: PHYSICIAN ASSISTANT

## 2017-02-13 PROCEDURE — 74011636637 HC RX REV CODE- 636/637: Performed by: PHYSICIAN ASSISTANT

## 2017-02-13 PROCEDURE — 65610000003 HC RM ICU SURGICAL

## 2017-02-13 PROCEDURE — 83735 ASSAY OF MAGNESIUM: CPT | Performed by: PHYSICIAN ASSISTANT

## 2017-02-13 PROCEDURE — 77030018719 HC DRSG PTCH ANTIMIC J&J -A

## 2017-02-13 PROCEDURE — 83615 LACTATE (LD) (LDH) ENZYME: CPT | Performed by: THORACIC SURGERY (CARDIOTHORACIC VASCULAR SURGERY)

## 2017-02-13 PROCEDURE — 80053 COMPREHEN METABOLIC PANEL: CPT | Performed by: PHYSICIAN ASSISTANT

## 2017-02-13 PROCEDURE — 71010 XR CHEST PORT: CPT

## 2017-02-13 PROCEDURE — 85027 COMPLETE CBC AUTOMATED: CPT | Performed by: PHYSICIAN ASSISTANT

## 2017-02-13 PROCEDURE — 74011250637 HC RX REV CODE- 250/637: Performed by: NURSE PRACTITIONER

## 2017-02-13 PROCEDURE — 77030013798 HC KT TRNSDUC PRSSR EDWD -B

## 2017-02-13 PROCEDURE — 74011250636 HC RX REV CODE- 250/636: Performed by: INTERNAL MEDICINE

## 2017-02-13 PROCEDURE — 82962 GLUCOSE BLOOD TEST: CPT

## 2017-02-13 PROCEDURE — 82272 OCCULT BLD FECES 1-3 TESTS: CPT | Performed by: THORACIC SURGERY (CARDIOTHORACIC VASCULAR SURGERY)

## 2017-02-13 PROCEDURE — 74011250636 HC RX REV CODE- 250/636: Performed by: NURSE PRACTITIONER

## 2017-02-13 PROCEDURE — 77010033678 HC OXYGEN DAILY

## 2017-02-13 PROCEDURE — 93306 TTE W/DOPPLER COMPLETE: CPT

## 2017-02-13 PROCEDURE — 85018 HEMOGLOBIN: CPT | Performed by: THORACIC SURGERY (CARDIOTHORACIC VASCULAR SURGERY)

## 2017-02-13 PROCEDURE — 36415 COLL VENOUS BLD VENIPUNCTURE: CPT | Performed by: PHYSICIAN ASSISTANT

## 2017-02-13 PROCEDURE — 74011000258 HC RX REV CODE- 258: Performed by: PHYSICIAN ASSISTANT

## 2017-02-13 PROCEDURE — 74011250636 HC RX REV CODE- 250/636: Performed by: PHYSICIAN ASSISTANT

## 2017-02-13 PROCEDURE — 77030014007 HC SPNG HEMSTAT J&J -B

## 2017-02-13 RX ORDER — HEPARIN SOD,PORCINE/0.9 % NACL 30K/1000ML
50-1000 INTRAVENOUS SOLUTION INTRAVENOUS AS NEEDED
Status: DISCONTINUED | OUTPATIENT
Start: 2017-02-14 | End: 2017-02-14 | Stop reason: HOSPADM

## 2017-02-13 RX ORDER — POTASSIUM CHLORIDE 29.8 MG/ML
20 INJECTION INTRAVENOUS ONCE
Status: DISCONTINUED | OUTPATIENT
Start: 2017-02-14 | End: 2017-02-14 | Stop reason: HOSPADM

## 2017-02-13 RX ORDER — BUMETANIDE 1 MG/1
2 TABLET ORAL 2 TIMES DAILY
Status: DISCONTINUED | OUTPATIENT
Start: 2017-02-13 | End: 2017-02-16

## 2017-02-13 RX ORDER — MAGNESIUM SULFATE HEPTAHYDRATE 40 MG/ML
2 INJECTION, SOLUTION INTRAVENOUS ONCE
Status: DISCONTINUED | OUTPATIENT
Start: 2017-02-14 | End: 2017-02-14 | Stop reason: HOSPADM

## 2017-02-13 RX ORDER — PROTAMINE SULFATE 10 MG/ML
500 INJECTION, SOLUTION INTRAVENOUS ONCE
Status: DISCONTINUED | OUTPATIENT
Start: 2017-02-14 | End: 2017-02-14 | Stop reason: HOSPADM

## 2017-02-13 RX ORDER — ALBUMIN HUMAN 50 G/1000ML
25 SOLUTION INTRAVENOUS ONCE
Status: DISCONTINUED | OUTPATIENT
Start: 2017-02-14 | End: 2017-02-14 | Stop reason: HOSPADM

## 2017-02-13 RX ORDER — DOBUTAMINE HYDROCHLORIDE 200 MG/100ML
2.5-1 INJECTION INTRAVENOUS
Status: DISCONTINUED | OUTPATIENT
Start: 2017-02-14 | End: 2017-02-14

## 2017-02-13 RX ORDER — DOPAMINE HYDROCHLORIDE 320 MG/100ML
5-20 INJECTION, SOLUTION INTRAVENOUS
Status: DISCONTINUED | OUTPATIENT
Start: 2017-02-14 | End: 2017-02-14

## 2017-02-13 RX ORDER — NITROGLYCERIN 20 MG/100ML
16.5 INJECTION INTRAVENOUS CONTINUOUS
Status: DISCONTINUED | OUTPATIENT
Start: 2017-02-14 | End: 2017-02-14

## 2017-02-13 RX ADMIN — PIPERACILLIN AND TAZOBACTAM 3.38 G: 3; .375 INJECTION, POWDER, FOR SOLUTION INTRAVENOUS at 14:24

## 2017-02-13 RX ADMIN — SODIUM CHLORIDE 10 ML/HR: 900 INJECTION, SOLUTION INTRAVENOUS at 19:23

## 2017-02-13 RX ADMIN — GABAPENTIN 100 MG: 100 CAPSULE ORAL at 10:30

## 2017-02-13 RX ADMIN — GABAPENTIN 100 MG: 100 CAPSULE ORAL at 19:05

## 2017-02-13 RX ADMIN — ATORVASTATIN CALCIUM 10 MG: 10 TABLET, FILM COATED ORAL at 10:30

## 2017-02-13 RX ADMIN — CLOPIDOGREL BISULFATE 75 MG: 75 TABLET, FILM COATED ORAL at 10:30

## 2017-02-13 RX ADMIN — CARVEDILOL 3.12 MG: 3.12 TABLET, FILM COATED ORAL at 10:32

## 2017-02-13 RX ADMIN — GLIMEPIRIDE 4 MG: 2 TABLET ORAL at 07:15

## 2017-02-13 RX ADMIN — OXYCODONE HYDROCHLORIDE AND ACETAMINOPHEN 2 TABLET: 5; 325 TABLET ORAL at 07:19

## 2017-02-13 RX ADMIN — AMIODARONE HYDROCHLORIDE 200 MG: 200 TABLET ORAL at 10:30

## 2017-02-13 RX ADMIN — BUMETANIDE 2 MG: 1 TABLET ORAL at 10:30

## 2017-02-13 RX ADMIN — BIVALIRUDIN 0.37 MG/KG/HR: 250 INJECTION, POWDER, LYOPHILIZED, FOR SOLUTION INTRAVENOUS at 20:26

## 2017-02-13 RX ADMIN — Medication 10 ML: at 15:24

## 2017-02-13 RX ADMIN — PIPERACILLIN AND TAZOBACTAM 3.38 G: 3; .375 INJECTION, POWDER, FOR SOLUTION INTRAVENOUS at 22:14

## 2017-02-13 RX ADMIN — MILRINONE LACTATE 0.38 MCG/KG/MIN: 200 INJECTION, SOLUTION INTRAVENOUS at 15:51

## 2017-02-13 RX ADMIN — SODIUM CHLORIDE 3 ML/HR: 900 INJECTION, SOLUTION INTRAVENOUS at 19:23

## 2017-02-13 RX ADMIN — INSULIN GLARGINE 10 UNITS: 100 INJECTION, SOLUTION SUBCUTANEOUS at 10:31

## 2017-02-13 RX ADMIN — OXYCODONE HYDROCHLORIDE AND ACETAMINOPHEN 2 TABLET: 5; 325 TABLET ORAL at 20:34

## 2017-02-13 RX ADMIN — SPIRONOLACTONE 25 MG: 25 TABLET, FILM COATED ORAL at 10:30

## 2017-02-13 RX ADMIN — CARVEDILOL 3.12 MG: 3.12 TABLET, FILM COATED ORAL at 19:05

## 2017-02-13 RX ADMIN — Medication 81 MG: at 10:30

## 2017-02-13 RX ADMIN — Medication 400 MG: at 10:30

## 2017-02-13 RX ADMIN — INSULIN LISPRO 2 UNITS: 100 INJECTION, SOLUTION INTRAVENOUS; SUBCUTANEOUS at 19:05

## 2017-02-13 RX ADMIN — MILRINONE LACTATE 0.38 MCG/KG/MIN: 200 INJECTION, SOLUTION INTRAVENOUS at 03:52

## 2017-02-13 RX ADMIN — BIVALIRUDIN 0.37 MG/KG/HR: 250 INJECTION, POWDER, LYOPHILIZED, FOR SOLUTION INTRAVENOUS at 09:15

## 2017-02-13 RX ADMIN — PIPERACILLIN AND TAZOBACTAM 3.38 G: 3; .375 INJECTION, POWDER, FOR SOLUTION INTRAVENOUS at 06:03

## 2017-02-13 RX ADMIN — OXYCODONE HYDROCHLORIDE AND ACETAMINOPHEN 2 TABLET: 5; 325 TABLET ORAL at 15:21

## 2017-02-13 RX ADMIN — BUMETANIDE 2 MG: 1 TABLET ORAL at 19:05

## 2017-02-13 RX ADMIN — AMIODARONE HYDROCHLORIDE 200 MG: 200 TABLET ORAL at 20:34

## 2017-02-13 NOTE — PROGRESS NOTES
[facility] Progress Note      NAME:  Fernanda Mason. :   1950   MRN:   [id number]   PCP:  None  CARD:   Dr. Miriam Reed    Date:  2017     Fernanda Mason. is a 61 y.o. male with a who presented for further evaluation of severe CAD and systolic heart failure. Subjective:   He was initially seen at [facility] 3 years ago and told that he had heart failure with LVEF 30%. He was lost to follow up due to lack of insurance and has not been seen by a physician in the interim. He complains of progressive fatigue, NAVARRO, PND, and edema over the past year, prompting presentation to [facility]. He also complained of lower extremity bullae, weeping, and pain. He denied palpitations, presyncope, syncope, or chest pain. Past 24 hours:  Impella supported PCI to  of RCA and Left Main - hemodynamically stable  Impella in place at P6 - 2.5 Liters   Feels much better  Diuresed well yesterday    Objective:     Visit Vitals    BP 94/53 (BP 1 Location: Right arm, BP Patient Position: At rest)    Pulse 83    Temp 98.1 °F (36.7 °C)    Resp 19    Ht 5' 9\" (1.753 m)    Wt 76.2 kg (167 lb 15.9 oz)    SpO2 97%    BMI 24.81 kg/m2      Hemodynamics:  CVP 8-9 mmHg    General:  fatigued    HEENT: Normocephalic, EOMI, PERRLA, Hearing intact, trachea mid-line    Neck:  supple, no significant adenopathy, carotids upstroke normal bilaterally, no bruits    CVP:  8 cm      Heart:  Diminished PMI    Normal S1 and S2, S3 gallop    Impella at P6 - Flow 2.5 L    Murmur: 1/6 systolic murmur    Lungs: Diminished breath sounds left lower lung    Abdomen: soft, non-tender. Bowel sounds normal. +HSM    Extremity: Trace edema bilaterally    Neuro: Alert and oriented to person, place, and time; normal strength and tone. Normal symmetric reflexes.  Normal coordination and gait       1901 -  0700  In: 4496.5 [P.O.:2180; I.V.:2316.5]  Out: 8325 [Urine:8325]  O2 Flow Rate (L/min): 1 l/min O2 Device: Nasal cannula  Temp (24hrs), Av.2 °F (36.8 °C), Min:98 °F (36.7 °C), Max:98.3 °F (36.8 °C)          Care Plan discussed with:    Comments   Patient x    Family      RN x    Care Manager                    Consultant:          Past History:     Past Medical History   Diagnosis Date    Cardiomyopathy ([address] 2017     A. Echo (17):  EF 5-10% with severe GHK,. Mildly dil LA. Mild TR. PASP 46. No past surgical history on file. Social History   Substance Use Topics    Smoking status: Not on file    Smokeless tobacco: Not on file    Alcohol use Not on file        No family history on file. Allergies: Allergies   Allergen Reactions    Heparin (Porcine) Unknown (comments)          Data Review:     CXR:   CXR Results  (Last 48 hours)               17 0422  XR CHEST PORT Final result    Impression:  IMPRESSION:   1. No change left pleural effusion or left lower lobe atelectasis. Narrative:  EXAM:  XR CHEST PORT       INDICATION:  impella, CHF,       COMPARISON:  2017       FINDINGS: A portable AP radiograph of the chest was obtained at 0353 hours. The   patient is on a cardiac monitor. The Impella cardiac assist device is noted. The   PICC line overlies the SVC. The heart size is at the upper limits of normal.       The left pleural effusion and left lower lobe atelectasis are unchanged. Right   lung is clear. No pulmonary edema. 17 0441  XR CHEST PORT Final result    Impression:  Impression: No interval change. Narrative: Indication: Follow-up abnormal chest x-ray, pleural effusion       Comparison to 2017. Portable exam obtained at 359 demonstrates little   change in the left pleural effusion and underlying consolidation compared to   prior examination.                    Echocardiogram:   Echo Results  (Last 48 hours)               17 0000  2D ECHO COMPLETE ADULT (TTE) W OR WO CONTR Final result    Narrative:  Marty Mayes [address] [place] [address]   [phone]       Transthoracic Echocardiogram       Patient: Josseline Soto   MRN: [id number]   ACCT #: [de-identified]   : [id number]   Age: 61 years   Gender: Male   Height: 70 in   Weight: 179.5 lb   BSA: 2 m squared   BP: 94 / 53 mmHg   Study date: 2017   Status: Routine   Location: [address] ACC #: [id number]       Allergies: HEPARIN (PORCINE)       Reading Group:  *CA[facility] Group   Technologist:  Leilani Rm [facility]   Reading Physician:  [place] Carrillo. STEVE Watkins:   Left ventricle: The ventricle was moderately to severely dilated. Systolic   function was severely reduced. Ejection fraction was estimated to be 10 %. There was severe diffuse hypokinesis. There was moderate spontaneous echo   contrast, indicative of stasis. Ventricular septum: There was moderate paradoxical motion. Left atrium: The atrium was dilated. Mitral valve: There was moderate regurgitation. Tricuspid valve: There was mild to moderate regurgitation. Pericardium: There was a moderate-sized left pleural effusion. INDICATIONS: Assess left ventricular function. HISTORY: Prior history: Eval EF       PROCEDURE: This was a routine study. The study included complete 2D   imaging, M-mode, complete spectral Doppler, and color Doppler. The heart   rate was 83 bpm, at the start of the study. Systolic blood pressure was 94   mmHg, at the start of the study. Diastolic blood pressure was 53 mmHg, at   the start of the study. Image quality was fair. LEFT VENTRICLE: The ventricle was moderately to severely dilated. Systolic   function was severely reduced. Ejection fraction was estimated to be 10 %. There was severe diffuse hypokinesis. There was moderate spontaneous echo   contrast, indicative of stasis. VENTRICULAR SEPTUM: There was moderate paradoxical motion.        RIGHT VENTRICLE: The size was normal. Systolic function was normal. Wall   thickness was normal.       LEFT ATRIUM: The atrium was dilated. RIGHT ATRIUM: Size was normal.       MITRAL VALVE: Normal valve structure. There was normal leaflet separation. DOPPLER: The transmitral velocity was within the normal range. There was   no evidence for stenosis. There was moderate regurgitation. AORTIC VALVE: Not well visualized due to Impella caatheter. TRICUSPID VALVE: Normal valve structure. There was normal leaflet   separation. DOPPLER: The transtricuspid velocity was within the normal   range. There was no evidence for tricuspid stenosis. There was mild to   moderate regurgitation. PULMONIC VALVE: Leaflets exhibited normal thickness, no calcification, and   normal cuspal separation. DOPPLER: The transpulmonic velocity was within   the normal range. There was no regurgitation. AORTA: The root exhibited normal size. PERICARDIUM: There was a moderate-sized left pleural effusion. SYSTEM MEASUREMENT TABLES       2D   %FS: 4.11 %   EDV(Teich): 214.26 ml   EF(Teich): 9.11 %   ESV(Teich): 194.75 ml   IVSd: 1.11 cm   LVEDV MOD A4C: 142.48 ml   LVEF MOD A4C: 23.62 %   LVESV MOD A4C: 108.83 ml   LVIDd: 6.48 cm   LVIDs: 6.21 cm   LVLd A4C: 9.49 cm   LVLs A4C: 8.08 cm   LVPWd: 0.86 cm   SV MOD A4C: 33.66 ml   SV(Teich): 19.51 ml       CW   AV Vmax: 0.93 m/s   AV maxPG: 3.48 mmHg   TR Vmax: 1.62 m/s   TR maxPG: 10.95 mmHg       MM   Ao Diam: 2.41 cm   LA Diam: 3.81 cm   LA/Ao: 1.58       PW   LVOT Vmax: 0.9 m/s   LVOT maxPG: 3.22 mmHg   E' Lat: 0.1 m/s   E' Sept: 0.04 m/s   E/E' Lat: 8.86   E/E' Sept: 22.31   MV A Glenn: 1.05 m/s   MV Dec [place]: 3.07 m/s2   MV DecT: 274.81 ms   MV E Glenn: 0.84 m/s   MV E/A Ratio: 0.8   MV PHT: 79.7 ms   MVA By PHT: 2.76 cm2   PV Vmax: 0.86 m/s   PV maxP.96 mmHg       Prepared and E-signed by       Jason Reyes. Joey Santiago M.D. Signed 2017 11:37:08                 No results found for this visit on 17.       ECG: EKG: ST with occasional PVC, NSIVCD, LAE    LABS:  Recent Results (from the past 24 hour(s))   GLUCOSE, POC    Collection Time: 02/12/17  5:19 PM   Result Value Ref Range    Glucose (POC) 226 (H) 65 - 100 mg/dL    Performed by Fátiam Mehta    HGB & HCT    Collection Time: 02/12/17  7:54 PM   Result Value Ref Range    HGB 8.5 (L) 12.1 - 17.0 g/dL    HCT 28.0 (L) 36.6 - 50.3 %   LD    Collection Time: 02/12/17  7:54 PM   Result Value Ref Range     (H) 85 - 241 U/L   GLUCOSE, POC    Collection Time: 02/12/17  9:35 PM   Result Value Ref Range    Glucose (POC) 112 (H) 65 - 100 mg/dL    Performed by FERN Poe    Collection Time: 02/13/17  3:54 AM   Result Value Ref Range    Sodium 135 (L) 136 - 145 mmol/L    Potassium 3.8 3.5 - 5.1 mmol/L    Chloride 98 97 - 108 mmol/L    CO2 29 21 - 32 mmol/L    Anion gap 8 5 - 15 mmol/L    Glucose 86 65 - 100 mg/dL    BUN 36 (H) 6 - 20 MG/DL    Creatinine 1.84 (H) 0.70 - 1.30 MG/DL    BUN/Creatinine ratio 20 12 - 20      GFR est AA 45 (L) >60 ml/min/1.73m2    GFR est non-AA 37 (L) >60 ml/min/1.73m2    Calcium 8.5 8.5 - 10.1 MG/DL    Bilirubin, total 0.7 0.2 - 1.0 MG/DL    ALT (SGPT) 19 12 - 78 U/L    AST (SGOT) 24 15 - 37 U/L    Alk.  phosphatase 113 45 - 117 U/L    Protein, total 6.9 6.4 - 8.2 g/dL    Albumin 3.0 (L) 3.5 - 5.0 g/dL    Globulin 3.9 2.0 - 4.0 g/dL    A-G Ratio 0.8 (L) 1.1 - 2.2     MAGNESIUM    Collection Time: 02/13/17  3:54 AM   Result Value Ref Range    Magnesium 2.3 1.6 - 2.4 mg/dL   CBC W/O DIFF    Collection Time: 02/13/17  3:54 AM   Result Value Ref Range    WBC 8.9 4.1 - 11.1 K/uL    RBC 3.62 (L) 4.10 - 5.70 M/uL    HGB 8.7 (L) 12.1 - 17.0 g/dL    HCT 28.2 (L) 36.6 - 50.3 %    MCV 77.9 (L) 80.0 - 99.0 FL    MCH 24.0 (L) 26.0 - 34.0 PG    MCHC 30.9 30.0 - 36.5 g/dL    RDW 17.4 (H) 11.5 - 14.5 %    PLATELET 610 (L) 217 - 400 K/uL   PTT    Collection Time: 02/13/17  3:54 AM   Result Value Ref Range    aPTT 76.1 (H) 22.1 - 32.5 sec    aPTT, therapeutic range     58.0 - 77.0 SECS   LD    Collection Time: 02/13/17  3:54 AM   Result Value Ref Range     (H) 85 - 241 U/L   GLUCOSE, POC    Collection Time: 02/13/17  7:13 AM   Result Value Ref Range    Glucose (POC) 97 65 - 100 mg/dL    Performed by Kristyn [facility], POC    Collection Time: 02/13/17 12:30 PM   Result Value Ref Range    Glucose (POC) 119 (H) 65 - 100 mg/dL    Performed by Franklyn Esparza      All Micro Results     Procedure Component Value Units Date/Time    CULTURE, BLOOD, PAIRED [[id number]] Collected:  02/05/17 0322    Order Status:  Completed Specimen:  Blood Updated:  02/10/17 0542     Special Requests: NO SPECIAL REQUESTS        Culture result: NO GROWTH 5 DAYS       CULTURE, BODY FLUID Chadwick Neri [[id number]] Collected:  01/26/17 1505    Order Status:  Completed Specimen:  Thoracentesis Updated:  01/30/17 1057     Special Requests: NO SPECIAL REQUESTS        GRAM STAIN OCCASIONAL  WBCS SEEN         NO ORGANISMS SEEN        Culture result: NO GROWTH 4 DAYS       CULTURE, Ezequiel Huh STAIN [[id number]] Collected:  01/25/17 2134    Order Status:  Completed Specimen:  Leg Updated:  01/27/17 1440     Special Requests: NO SPECIAL REQUESTS        GRAM STAIN RARE  WBCS SEEN         NO ORGANISMS SEEN        Culture result: LIGHT  STREPTOCOCCI, BETA HEMOLYTIC GROUP C  . .. Penicillin and ampicillin are drugs of choice for treatment of beta-hemolytic streptococcal infections. Susceptibility testing of penicillins and beta-lactams approved by the FDA for treatment of beta-hemolytic streptococcal infections need not be performed routinely, because nonsusceptible isolates are extremely rare.  CLSI 2012         MODERATE  BETTIE TROPICALIS           Abdominal Ultrasound 2/4/17  IMPRESSION: Distended gallbladder with gallbladder wall thickening,  pericholecystic fluid and tenderness to probe palpation during exam. Findings  are consistent with acalculus cholecystitis. Thoracentesis 1/26/17  Specimen Source   1: Pleural Fluid   CYTOLOGIC INTERPRETATION:   Scattered mesothelial cells with degenerative changes and mixed inflammatory cells   General Categorization   No cells diagnostic for malignancy   Specimen Adequacy   Satisfactory for evaluation       Cardiac MRI 1/31/17  IMPRESSION  1. Markedly dilated left ventricle with 3-D end-diastolic volume index to body  surface area of 153 mL/sq m. Mild eccentric left ventricular hypertrophy with  3-D mass index to body surface area of 85 g/sq m. Severe left ventricular  systolic dysfunction. Severe global hypokinesis with regional variation. 3-D  LVEF 10%. 2. Moderately dilated right ventricle by 3-D volumetric assessment. RV end  diastolic volume indexed to body surface area of 138 mL/sq m. Moderate to severe  right ventricular systolic dysfunction. Global hypokinesis. 3-D RVEF 25%. 3. Apical a tethered mitral valve leaflets. Mild mitral regurgitation. 4. Moderately severe 3+ tricuspid regurgitation. 5. On LGE study, the anterior wall, anteroseptal wall, anteroapical wall, and  anterior lateral wall are largely viable without significant myocardial  infarction. The entire inferior wall and inferoseptal wall are completely viable  without any infarct. The base to mid inferolateral wall demonstrate a near  transmural greater than 75% thickness infarct with minimal or no viable  myocardium in this territory. The distal inferolateral wall, apical lateral wall  does not demonstrate any infarct and are largely viable. Based on the viability  imaging, the entire LAD territory is largely viable and should recover upon  revascularization. The entire RCA territory is largely viable and should recover  upon revascularization. The LCx territory demonstrate medium-size infarct with  limited viability. 6. Large left-sided pleural effusion with passive atelectasis of the left lung.   7. Dilated branch pulmonary arteries suggest pulmonary hypertension. 8. Markedly dilated left atrium measuring 59 x 55 mm. Moderately dilated right  atrium measuring 46 x 56 mm.     Medications reviewed:    Current Facility-Administered Medications   Medication Dose Route Frequency    bumetanide (BUMEX) tablet 2 mg  2 mg Oral BID    milrinone (PRIMACOR) 20 MG/100 ML D5W infusion  0.375 mcg/kg/min IntraVENous CONTINUOUS    nicotine (NICODERM CQ) 14 mg/24 hr patch 1 Patch  1 Patch TransDERmal DAILY    spironolactone (ALDACTONE) tablet 25 mg  25 mg Oral DAILY    bisacodyl (DULCOLAX) suppository 10 mg  10 mg Rectal DAILY PRN    magnesium hydroxide (MILK OF MAGNESIA) 400 mg/5 mL oral suspension 30 mL  30 mL Oral DAILY PRN    morphine injection 1-2 mg  1-2 mg IntraVENous Q3H PRN    sodium chloride (NS) flush 5-10 mL  5-10 mL IntraVENous Q8H    sodium chloride (NS) flush 5-10 mL  5-10 mL IntraVENous PRN    hydrocortisone Sod Succ (PF) (SOLU-CORTEF) injection 100 mg  100 mg IntraVENous ONCE PRN    nitroglycerin (NITROSTAT) tablet 0.4 mg  0.4 mg SubLINGual Q5MIN PRN    clopidogrel (PLAVIX) tablet 75 mg  75 mg Oral DAILY    bivalirudin (ANGIOMAX) 250 mg in 0.9% sodium chloride (MBP/ADV) 50 mL  0.2-2.5 mg/kg/hr IntraVENous CONTINUOUS    dextrose 5% infusion  14 mL/hr IntraVENous CONTINUOUS    docusate sodium (COLACE) capsule 100 mg  100 mg Oral DAILY PRN    piperacillin-tazobactam (ZOSYN) 3.375 g in 0.9% sodium chloride (MBP/ADV) 100 mL  3.375 g IntraVENous Q8H    glimepiride (AMARYL) tablet 4 mg  4 mg Oral ACB    insulin glargine (LANTUS) injection 10 Units  10 Units SubCUTAneous DAILY    gabapentin (NEURONTIN) capsule 100 mg  100 mg Oral BID    carvedilol (COREG) tablet 3.125 mg  3.125 mg Oral BID WITH MEALS    amiodarone (CORDARONE) tablet 200 mg  200 mg Oral Q12H    bacitracin 500 unit/gram packet 1 Packet  1 Packet Topical PRN    atorvastatin (LIPITOR) tablet 10 mg  10 mg Oral DAILY    0.9% sodium chloride infusion  10 mL/hr IntraVENous CONTINUOUS    acetaminophen (TYLENOL) tablet 650 mg  650 mg Oral Q6H PRN    aspirin delayed-release tablet 81 mg  81 mg Oral DAILY    glucagon (GLUCAGEN) injection 1 mg  1 mg IntraMUSCular PRN    glucose chewable tablet 16 g  4 Tab Oral PRN    insulin lispro (HUMALOG) injection   SubCUTAneous AC&HS    magnesium oxide (MAG-OX) tablet 400 mg  400 mg Oral DAILY    0.9% sodium chloride infusion  3 mL/hr IntraVENous CONTINUOUS    sodium chloride (NS) flush 5-10 mL  5-10 mL IntraVENous Q8H    sodium chloride (NS) flush 5-10 mL  5-10 mL IntraVENous PRN    albuterol (PROVENTIL VENTOLIN) nebulizer solution 2.5 mg  2.5 mg Nebulization Q4H PRN    diphenhydrAMINE (BENADRYL) capsule 25 mg  25 mg Oral QHS PRN    oxyCODONE-acetaminophen (PERCOCET) 5-325 mg per tablet 2 Tab  2 Tab Oral Q4H PRN    ELECTROLYTE REPLACEMENT PROTOCOL  1 Each Other PRN     HIDA Scan 2/7/17  Gallbladder emptying study was performed per protocol utilizing 5. 2mCi Tc-99 M  Choletec and 1.86 mcg CCK intravenously. There is normal tracer distribution  throughout the liver. Activity is noted in the gallbladder, common bile duct,  and small bowel. The gallbladder ejection fraction is 31% (normal greater than  or equal to 35%).    IMPRESSION: Abnormal gallbladder emptying study with an EF of only 31%.        IMPRESSION / PLAN:    1. CAD - severe native vessel disease, s/p PCI for  of RCA and left main with Impella support   Stable on ASA, clopidogrel, bivalrudin, BB, and statin    2. Acute on chronic systolic heart failure (ICM with LVEF 5-10%) - Stage D, NYHA Class IV   Currently inotropic dependent, repeat echo with improved LVEF 20-24%   On IV milrinone 0.375, bumex 2 mg tid, and spironolactone 25 mg daily   Continue Impella support at P6 with plans to remove by Dr. Ashkan Green tomorrow am  - stable , Hgb 8.7   Hold bivalrudin at 0300   Readjusted milrinone to reflect new weight   Decrease bumex to 2 mg bid    3.  Persistent atrial fibrillation   On IV bivalrudin   Resume warfarin prior to discharge    4. DM2 with Hga1c 13.5   On lantus, SSI, and glimepiride   Diabetic education   Accuchecks    5. Cellutitis   Improving on IV cefazolin    6. CKD3   Serum creatinine improving on inotropic support and Impella   Continue diuresis    7. COPD with FEV1 51% predicted    8. Acalculous CHolecystitis   On IV zosyn     9. Hyponatremia   Reflects severity of heart failure and volume overload    10. Nicotine Addiction   Smoking cessation counseling   Nicotine patch - reduce dose to 14 mg/24 hour    11. CECILIA    Stable on IV bivalirudin    Hgb 9.3, plt 142      Critical care was necessary to treat or prevent imminent or life threatening deterioration of the following conditions: cardiac failure, respiratory failure and CNS failure or compromise    Total Critical Care time spent:  65 minutes. There was no overlap with other services  5607-3021, 6401-2749    Services Provided:  1. Telemetry review and 12 lead ECG interpretation  2. Hemodynamic interpretation, assessment, and management  3. Review and interpretation of CXR  4. Review and interpretation of lab values  5. Review and interpretation of microbiologic data and culture results  6. Review of medications and administration  7. Review and interpretation of nutrition requirements and management  8. Discussion of management withother consultants and services  9. Clinical update to family members    Josh Rogers.  Pan Grimm MD, [place] [address] Director     [facility], [address] [place] [address], [address] [place] [address]  Office [phone]  Fax [phone]  24 hour VAD/HF Pager: [phone]

## 2017-02-13 NOTE — PROGRESS NOTES
16:30 - 17:03    Repeated ECHO and EF up to 10-15% range today compared to 5-10% range last week. He feels a little bit better but is tired of laying on his back and has been having trouble having BM's. Overall he looks pretty good. Not sure if we are going to be able to improve his EF much more at this point. Will plan on holding his angiomax tomorrow at 4 am for Impella removal / cutdown at 7:30. Will try and have the Impella rep's present for cutdown. Neuro - A and O x 3      Cardiovascular - acute on chronic systolic heart (NYHA class IV, stage D); EF 10-15%; ; Impella at P6; milrinone at 0.375; PO Bumex 2 mg PO BID; spironolactone 25 mg QD; extremity with Impella is warm       Pulmonary - severe COPD (FEV-1 51% predicted); pulmonary HTN; CXR - shows persistent small left pleural effusion      Renal - acute on chronic renal insufficiency (stage III CKD); creatinine 1.84       Intake/Output Summary (Last 24 hours) at 02/13/17 1656  Last data filed at 02/13/17 1400   Gross per 24 hour   Intake           2782.1 ml   Output             5525 ml   Net          -2742.9 ml     Is (-) 1300 cc since this am       GI - acalculous cholecystitis / biliary akinesia; on Zosyn ; alkaline phosphatase 113; minimal abdominal pain; has had several BMs.        Heme - + CECILIA; Hgb 8.7, platelets 230; on bivalirudin for Impella; ASA / plavix for stents      ID - Zosyn for acalculous cholecystitis; WBC 8.9; minimal lower extremity cellulitis; 1+ lower extremity edema       F/E - hyponatremia (Na 135) likely due to diuresis / CHF; hypokalemia (K 3.8) on replacement protocol      Nutrition - tolerating a normal diet      Endocrine - DM; DTC following      Total critical care time - 33 minutes (CPT 11788)

## 2017-02-13 NOTE — PROGRESS NOTES
Problem: Heart Failure: Day 5  Goal: Activity/Safety  Outcome: Progressing Towards Goal  Continues to stay on bed rest due to Impella on left groin and lines on right groin. Patient understands fall precautions and assists in turning.

## 2017-02-13 NOTE — WOUND CARE
Wound and Skin Consult / reassessment for bilateral lower extremities weeping wounds- reviewed chart, assessed patient and spoke with nurse, Nai More. Nai More present for assessment. Patient alert but question orientation at times. Complains of pain with leg movement but otherwise no pain. No change in wound recommendations, due to improvement.      Assessment-supine in bed with head of bed elevated.      1. Sacrum is clear. 2. Heels are dry but geri. Right heel peeling skin noted because patient stated awhile ago he was peeling the skin       While here in the hospital.  3. Left and right lower legs much improved, not red a little dry. Ulcers are healed except for right medial lower leg. 4. Right medial lower leg removed mepilex border dressing with scant yellow drainage, no odor. Intact red skin with scabbing covering an area of: 6.0cm x 3.0cm x 0.0cm. With in this red scabbed area in the center      Is a wound: 1.2cm x 1.0cm x 0.0cm / 100% slough. Cleaned with Makayla Klenz Spray, applied, Carrasyn Gel and       mepilex border dressing daily.         Recommendations-      1. Patient is on a Total Care Sport for continuous air flow and pressure redistribution. Use only Air Flow chuxs and draw sheet under patient. 2. Reposition: Continue to turn every 1-2hrs to reposition for pressure relief, with pillows to protect bony prominences/float heels / needs assistance with legs/ 2 assist post-op. 3.Continence/ mendoza in and asks for bedpan. 4. Continue to monitor pain, nutrition and skin assessment. 5. Wound Care-  - daily clean right medial lower extremity with Makayla Klenz Spray, dry, apply thin film of carrasyn gel to right  medial     Lower leg and mepilex border dressing. Change daily.     Kike Ramos, WILLIAM/WOCN

## 2017-02-13 NOTE — PROGRESS NOTES
Called the impella rep. To dicuss current impella values. Concern was the purge flow gradually decreasing. The rep. Stated it is normal for purge flow to decreas 2-3ml in a day. But continue to monitor.

## 2017-02-13 NOTE — PROGRESS NOTES
2/12/17 2000 Received report and assumed care of patient.  -Impella 3.5/CP flowing at 2.4-2.5 L/m  -Purge flow decreased, but normal.  -No placement signal, but trending placement readings. Otherwise hemodynamically stable. 2/13/17    0330 Left groin sheath (Impella) bleeding constantly. Dressing removed and site cleaned. Surgecel applied with 4x4's, abd bad, and transparent dressings using sterile technique. Bleeding controlled at this time. 4240 Dr Man Keyes and Vitaliy Field NP at bedside for morning rounds. Plan to have ECHO today with Impella on P3. Will reevaluate need for Impella support after Echo.    0800 Bedside shift change report given to Sierra Rajput RN (oncoming nurse) by Simi Hope RN (offgoing nurse). Report included the following information SBAR, Kardex, Procedure Summary, Intake/Output, MAR, Recent Results and Cardiac Rhythm NSR.

## 2017-02-13 NOTE — PROGRESS NOTES
[facility]   [address] [facility], [address] [place] [address], [facility]  Phone: [phone]   [phone]       Nephrology Progress Note  Era Friend.     1950     [id number]  Date of Admission : 1/20/2017 02/13/17    CC: Follow up for CKD/ARF      Assessment and Plan   CHACORTA on CKD:  - Cr appears stable and likely at baseline  - cont current diuretics for now  - daily labs while here    Hypokalemia:  - K stable  - replete as needed    Hyponatremia :  - combination of  CHF + SIADH from chronic alcoholism  - Na stable    Acute on Chronic Systolic CHF w/ severe CMP  - echo with EF 5-10%, severely dilated LV, severe TR  - Multivessel CAD : MRI showed viable myocardium   - on Milrinone and has Impella   - s/p LAD and RCA stents 2/9    Cellulitis RLE w/ Pustular lesions : resolved       Interval History:  Seen and examined. Stable. Good UOP, Cr stable. No cp, sob, n/v/d reported. Review of Systems: Pertinent items are noted in HPI.     Current Medications:   Current Facility-Administered Medications   Medication Dose Route Frequency    bumetanide (BUMEX) tablet 2 mg  2 mg Oral BID    milrinone (PRIMACOR) 20 MG/100 ML D5W infusion  0.375 mcg/kg/min IntraVENous CONTINUOUS    nicotine (NICODERM CQ) 14 mg/24 hr patch 1 Patch  1 Patch TransDERmal DAILY    spironolactone (ALDACTONE) tablet 25 mg  25 mg Oral DAILY    bisacodyl (DULCOLAX) suppository 10 mg  10 mg Rectal DAILY PRN    magnesium hydroxide (MILK OF MAGNESIA) 400 mg/5 mL oral suspension 30 mL  30 mL Oral DAILY PRN    morphine injection 1-2 mg  1-2 mg IntraVENous Q3H PRN    sodium chloride (NS) flush 5-10 mL  5-10 mL IntraVENous Q8H    sodium chloride (NS) flush 5-10 mL  5-10 mL IntraVENous PRN    hydrocortisone Sod Succ (PF) (SOLU-CORTEF) injection 100 mg  100 mg IntraVENous ONCE PRN    nitroglycerin (NITROSTAT) tablet 0.4 mg  0.4 mg SubLINGual Q5MIN PRN    clopidogrel (PLAVIX) tablet 75 mg  75 mg Oral DAILY    bivalirudin (ANGIOMAX) 250 mg in 0.9% sodium chloride (MBP/ADV) 50 mL  0.2-2.5 mg/kg/hr IntraVENous CONTINUOUS    dextrose 5% infusion  14 mL/hr IntraVENous CONTINUOUS    docusate sodium (COLACE) capsule 100 mg  100 mg Oral DAILY PRN    piperacillin-tazobactam (ZOSYN) 3.375 g in 0.9% sodium chloride (MBP/ADV) 100 mL  3.375 g IntraVENous Q8H    glimepiride (AMARYL) tablet 4 mg  4 mg Oral ACB    insulin glargine (LANTUS) injection 10 Units  10 Units SubCUTAneous DAILY    gabapentin (NEURONTIN) capsule 100 mg  100 mg Oral BID    carvedilol (COREG) tablet 3.125 mg  3.125 mg Oral BID WITH MEALS    amiodarone (CORDARONE) tablet 200 mg  200 mg Oral Q12H    bacitracin 500 unit/gram packet 1 Packet  1 Packet Topical PRN    atorvastatin (LIPITOR) tablet 10 mg  10 mg Oral DAILY    0.9% sodium chloride infusion  10 mL/hr IntraVENous CONTINUOUS    acetaminophen (TYLENOL) tablet 650 mg  650 mg Oral Q6H PRN    aspirin delayed-release tablet 81 mg  81 mg Oral DAILY    glucagon (GLUCAGEN) injection 1 mg  1 mg IntraMUSCular PRN    glucose chewable tablet 16 g  4 Tab Oral PRN    insulin lispro (HUMALOG) injection   SubCUTAneous AC&HS    magnesium oxide (MAG-OX) tablet 400 mg  400 mg Oral DAILY    0.9% sodium chloride infusion  3 mL/hr IntraVENous CONTINUOUS    sodium chloride (NS) flush 5-10 mL  5-10 mL IntraVENous Q8H    sodium chloride (NS) flush 5-10 mL  5-10 mL IntraVENous PRN    albuterol (PROVENTIL VENTOLIN) nebulizer solution 2.5 mg  2.5 mg Nebulization Q4H PRN    diphenhydrAMINE (BENADRYL) capsule 25 mg  25 mg Oral QHS PRN    oxyCODONE-acetaminophen (PERCOCET) 5-325 mg per tablet 2 Tab  2 Tab Oral Q4H PRN    ELECTROLYTE REPLACEMENT PROTOCOL  1 Each Other PRN      Allergies   Allergen Reactions    Heparin (Porcine) Unknown (comments)       Objective:  Vitals:    Vitals:    02/13/17 0700 02/13/17 0756 02/13/17 0800 02/13/17 0900   BP:  94/53     Pulse: 83  88 84   Resp: 17  15 16   Temp:   98 °F (36.7 °C)    SpO2: 96%  95% 95%   Weight:       Height:         Intake and Output:  02/13 0701 - 02/13 1900  In: 83.8 [I.V.:83.8]  Out: 600 [Urine:600]  02/11 1901 - 02/13 0700  In: 4496.5 [P.O.:2180; I.V.:2316.5]  Out: 8325 [Urine:8325]    Physical Examination:  General: In NAD  Resp:  Lungs CTA  CV:  RRR,  no murmur or rub,+ LE edema  GI:  Soft, NT, + Bowel sounds,  Neurologic:  Non focal  Skin:  RLE cellulitis - resolving   Ext                   Edema +,      []    High complexity decision making was performed  []    Patient is at high-risk of decompensation with multiple organ involvement    Lab Data Personally Reviewed: I have reviewed all the pertinent labs, microbiology data and radiology studies during assessment. Recent Labs      02/13/17 0354 02/12/17 0418 02/11/17 0413   NA  135*  135*  135*   K  3.8  3.5  3.5   CL  98  97  97   CO2  29  28  30   GLU  86  143*  186*   BUN  36*  32*  35*   CREA  1.84*  1.62*  1.62*   CA  8.5  8.5  8.5   MG  2.3  2.1  2.2   ALB  3.0*  2.9*  2.8*   SGOT  24  26  22   ALT  19  18  17     Recent Labs      02/13/17   0354  02/12/17 1954 02/12/17   1226  02/12/17 0418  02/11/17 2000 02/11/17 0413   WBC  8.9   --    --   8.7   --    --   7.6   HGB  8.7*  8.5*  9.0*  9.3*  9.2*   < >  9.2*   HCT  28.2*  28.0*  28.9*  30.0*  29.7*   < >  29.8*   PLT  130*   --    --   142*   --    --   145*    < > = values in this interval not displayed. No results found for: SDES  Lab Results   Component Value Date/Time    Culture result: NO GROWTH 5 DAYS 02/05/2017 03:22 AM    Culture result: NO GROWTH 4 DAYS 01/26/2017 03:05 PM    Culture result:  01/25/2017 09:34 PM     LIGHT  STREPTOCOCCI, BETA HEMOLYTIC GROUP C  . .. Penicillin and ampicillin are drugs of choice for treatment of beta-hemolytic streptococcal infections.  Susceptibility testing of penicillins and beta-lactams approved by the FDA for treatment of beta-hemolytic streptococcal infections need not be performed routinely, because nonsusceptible isolates are extremely rare. CLSI 2012      Culture result: MODERATE  CANDIDA TROPICALIS   01/25/2017 09:34 PM    Culture result: NO SIGNIFICANT GROWTH 01/19/2017 01:35 AM     Recent Results (from the past 24 hour(s))   GLUCOSE, POC    Collection Time: 02/12/17 12:15 PM   Result Value Ref Range    Glucose (POC) 202 (H) 65 - 100 mg/dL    Performed by Tigist Muro    HGB & HCT    Collection Time: 02/12/17 12:26 PM   Result Value Ref Range    HGB 9.0 (L) 12.1 - 17.0 g/dL    HCT 28.9 (L) 36.6 - 50.3 %   LD    Collection Time: 02/12/17 12:26 PM   Result Value Ref Range     (H) 85 - 241 U/L   PTT    Collection Time: 02/12/17  3:43 PM   Result Value Ref Range    aPTT 76.7 (H) 22.1 - 32.5 sec    aPTT, therapeutic range     58.0 - 77.0 SECS   GLUCOSE, POC    Collection Time: 02/12/17  5:19 PM   Result Value Ref Range    Glucose (POC) 226 (H) 65 - 100 mg/dL    Performed by Leydi Marcus    HGB & HCT    Collection Time: 02/12/17  7:54 PM   Result Value Ref Range    HGB 8.5 (L) 12.1 - 17.0 g/dL    HCT 28.0 (L) 36.6 - 50.3 %   LD    Collection Time: 02/12/17  7:54 PM   Result Value Ref Range     (H) 85 - 241 U/L   GLUCOSE, POC    Collection Time: 02/12/17  9:35 PM   Result Value Ref Range    Glucose (POC) 112 (H) 65 - 100 mg/dL    Performed by Tima Barron, COMPREHENSIVE    Collection Time: 02/13/17  3:54 AM   Result Value Ref Range    Sodium 135 (L) 136 - 145 mmol/L    Potassium 3.8 3.5 - 5.1 mmol/L    Chloride 98 97 - 108 mmol/L    CO2 29 21 - 32 mmol/L    Anion gap 8 5 - 15 mmol/L    Glucose 86 65 - 100 mg/dL    BUN 36 (H) 6 - 20 MG/DL    Creatinine 1.84 (H) 0.70 - 1.30 MG/DL    BUN/Creatinine ratio 20 12 - 20      GFR est AA 45 (L) >60 ml/min/1.73m2    GFR est non-AA 37 (L) >60 ml/min/1.73m2    Calcium 8.5 8.5 - 10.1 MG/DL    Bilirubin, total 0.7 0.2 - 1.0 MG/DL    ALT (SGPT) 19 12 - 78 U/L    AST (SGOT) 24 15 - 37 U/L    Alk.  phosphatase 113 45 - 117 U/L Protein, total 6.9 6.4 - 8.2 g/dL    Albumin 3.0 (L) 3.5 - 5.0 g/dL    Globulin 3.9 2.0 - 4.0 g/dL    A-G Ratio 0.8 (L) 1.1 - 2.2     MAGNESIUM    Collection Time: 02/13/17  3:54 AM   Result Value Ref Range    Magnesium 2.3 1.6 - 2.4 mg/dL   CBC W/O DIFF    Collection Time: 02/13/17  3:54 AM   Result Value Ref Range    WBC 8.9 4.1 - 11.1 K/uL    RBC 3.62 (L) 4.10 - 5.70 M/uL    HGB 8.7 (L) 12.1 - 17.0 g/dL    HCT 28.2 (L) 36.6 - 50.3 %    MCV 77.9 (L) 80.0 - 99.0 FL    MCH 24.0 (L) 26.0 - 34.0 PG    MCHC 30.9 30.0 - 36.5 g/dL    RDW 17.4 (H) 11.5 - 14.5 %    PLATELET 797 (L) 217 - 400 K/uL   PTT    Collection Time: 02/13/17  3:54 AM   Result Value Ref Range    aPTT 76.1 (H) 22.1 - 32.5 sec    aPTT, therapeutic range     58.0 - 77.0 SECS   LD    Collection Time: 02/13/17  3:54 AM   Result Value Ref Range     (H) 85 - 241 U/L   GLUCOSE, POC    Collection Time: 02/13/17  7:13 AM   Result Value Ref Range    Glucose (POC) 97 65 - 100 mg/dL    Performed by Rosamaria Burden             I have reviewed the flowsheets. Chart and Pertinent Notes have been reviewed. No change in PMH ,family and social history from Consult note.       Rocío Ly MD

## 2017-02-13 NOTE — PROGRESS NOTES
Attended IDR in CVICU where patient's condition and care were discussed.  [facility]'s Staff  ([place] [address] Patient Care Specialist)   Paging Service [phone])

## 2017-02-13 NOTE — CARDIO/PULMONARY
Cardiac Wellness: CAD education folder is at the bedside of Kacey Cowart Dalton Carol Ann He had a repeat echo today post stenting and hopes to have the results soon so that the Impella can be removed. Educated using teach back method. Reviewed CAD diagnosis definition and purpose of intervention. This is a new diagnosis for Fernanda Balderas., he has a fair understanding of CAD diagnosis and treatment. Identified pertinent CAD risk factors including diabetes, high cholesterol. .  Patient is a current smoker. Reviewed importance of medication compliance and follow up appointments with cardiologist.  Discussed purpose of plavix and potential side effects, signs and symptoms to report to physician after discharge. Emphasized value of cardiac rehab, discussed Cardiac Wellness Program and encouraged enrollment. Fernanda Kna is interested in the program. The  helped to fill out the Care Card application since he is uninsured. Patti Soliz. verbalized understanding with questions answered.     Harriet Houston RN

## 2017-02-13 NOTE — PROGRESS NOTES
Advanced Heart Failure ICU Note      Admit Date: 2017       Procedure:  SVO2 swan placed via RFV  Impella placed via LFA  PCI performed via RFA     Successful MIKE pRCA: 2.5x38 Xience + 2.75x12 Xience overlapping. Successful MIKE ost left main: 4.0x12 Xience post-dil with 4.5x12 NC. By Dr. Zoe Han        Subjective:     Pt lying in bed - anxious to get Impella out. Hemoydynamics stable - remains on Milrinone at .375 mcg/kg/min and Angiomax. Denies N/V, CP or SOB. Impella at P6 this morning.     IMPRESSION/Plan:   s/p PCI w/ Impella support - repeat echo with Impella turned to P3 - no significant change in EF. Will hold Angiomax for possible plan for removal of Impella in OR w/ Dr. Yoo Reap this afternoon. Cont. ASA and Plavix per cardiology, continue milrinone. Remain on statin, low dose BB; no ACEi d/t renal dysfunction. Acute on chronic systolic heart failure (ICM) - Stage D, NYHA Class IV - maintian Milrinone   0.375 mcg/kg/min, cont. Will reduce Bumex to 2 mg BID continue low dose Coreg, Strict I/Os, Replete electrolytes prn, Hold ACE-I due to CHACORTA on CKD. Daily weights      Acalculus Cholecystitis - improving, cont. To monitor, continue Zosyn. HIDA showed EF 31%.      L pleural effusion - check upright x-ray when Impella removed, cont. Diuretics     CECILIA - REJI positive, platelets 600 today - close f/u NO HEPARIN     Hyponatremia - improving, trend     Diabetes Mellitus - cont. Current treatment coverage, close monitor of BS. DTC following     Cellulitis - on Ancef and Zosyn per ID. Legs look much better today     H/o Tobacco abuse - smoking cessation, nicotine patch     CHACORTA on CKD3 - stable, renal following - decrease Bumex to 2 mg TID for now      Anemia - stable, monitor     Pulmonary HTN -           Objective:   Vitals:  Blood pressure 94/53, pulse 83, temperature 98 °F (36.7 °C), resp. rate 12, height 5' 9\" (1.753 m), weight 167 lb 15.9 oz (76.2 kg), SpO2 95 %.   Temp (24hrs), Av.2 °F (36.8 °C), Min:98 °F (36.7 °C), Max:98.3 °F (36.8 °C)    Oxygen Therapy:  Oxygen Therapy  O2 Sat (%): 95 % (02/13/17 1000)  Pulse via Oximetry: 83 beats per minute (02/13/17 1000)  O2 Device: Nasal cannula (02/13/17 0800)  O2 Flow Rate (L/min): 1 l/min (02/13/17 0800)  ETCO2 (mmHg): 1 mmHg (02/07/17 0821)    EKG: sinus tach    CXR -No change left pleural effusion or left lower lobe atelectasis. Admission Weight: Last Weight   Weight: 178 lb 2.1 oz (80.8 kg) Weight: 167 lb 15.9 oz (76.2 kg)     Intake / Output / Drain:  Current Shift: 02/13 0701 - 02/13 1900  In: 83.8 [I.V.:83.8]  Out: 600 [Urine:600]  Last 24 hrs.: 02/11 1901 - 02/13 0700  In: 4496.5 [P.O.:2180; I.V.:2316.5]  Out: 8325 [Urine:8325]      EXAM:    HEENT:  EOMI       CVS:  S1/S2       LUNGS:  Decreased L side axillary, R side clear                  ABD:  +BS, soft, NT/ND                  EXT:  1+ b/l LE edema, +erythema                  Neuro:  No obvious deficits       Labs:   Lab Results   Component Value Date/Time    Glucose (POC) 97 02/13/2017 07:13 AM    Glucose (POC) 112 02/12/2017 09:35 PM    Glucose (POC) 226 02/12/2017 05:19 PM    Glucose (POC) 202 02/12/2017 12:15 PM    Glucose (POC) 167 02/12/2017 08:38 AM     Recent Labs      02/13/17   0354  02/12/17   1954  02/12/17   1226  02/12/17   0418   02/11/17   0413   NA  135*   --    --   135*   --   135*   K  3.8   --    --   3.5   --   3.5   CL  98   --    --   97   --   97   CO2  29   --    --   28   --   30   BUN  36*   --    --   32*   --   35*   CREA  1.84*   --    --   1.62*   --   1.62*   GLU  86   --    --   143*   --   186*   CA  8.5   --    --   8.5   --   8.5   ALB  3.0*   --    --   2.9*   --   2.8*   WBC  8.9   --    --   8.7   --   7.6   HGB  8.7*  8.5*  9.0*  9.3*   < >  9.2*   HCT  28.2*  28.0*  28.9*  30.0*   < >  29.8*   PLT  130*   --    --   142*   --   145*    < > = values in this interval not displayed.        Pt seen w/ Dr. Sebas Henderson and Dr. Marco A Cottrell By: Poncho aMya, PA-TIMOTHY      Saw patient, agree with above  Risk of morbidity and mortality - high  Medical decision making - high complexity

## 2017-02-13 NOTE — PROGRESS NOTES
0800 - Report received from Maribel Rubio [address]. 54. - Echo tech here to perform echo. Daniel Hatchma at bedside, order received to reduce Impella to P-3 during echo then increase back to P-5.  0915 - Echo complete. Impella increased back to P-5. Dr. Micah Wharton at bedside, order received to hold Angiomax for possible Impella removal this afternoon. 0930 - Impella decreased to p-4 by Dr. Micah Wharton.  1200 - Spoke with THANIA [place] regarding pt's coagulation status, clarified that angiomax is to remain off at this time. This was confirmed. 80 - Dr. Shauna Aquino at bedside, order received to restart Angiomax for now, Impella to come out tomorrow. Pt had stool that appeared maroon in color, order received from Dr. Shauna Aquino to send ia. 1730 - ptt therapeutic, no change to angiomax. 1800 - Bilateral groin dressings changed, old dressings saturated with serosanguinous fluid. 2000 - Bedside and Verbal shift change report given to Emir Harmon RN (oncoming nurse) by Yasmin Michaud RN (offgoing nurse). Report included the following information SBAR, Intake/Output, Recent Results and Cardiac Rhythm NSR.

## 2017-02-13 NOTE — PROGRESS NOTES
Bedside shift change report given to Lazaro Ji  (oncoming nurse) by Ileana Rehman (offgoing nurse). Report included the following information SBAR, Kardex, MAR and Recent Results.

## 2017-02-14 ENCOUNTER — ANESTHESIA EVENT (OUTPATIENT)
Dept: CARDIOTHORACIC SURGERY | Age: 67
DRG: 215 | End: 2017-02-14
Payer: SELF-PAY

## 2017-02-14 ENCOUNTER — APPOINTMENT (OUTPATIENT)
Dept: GENERAL RADIOLOGY | Age: 67
DRG: 215 | End: 2017-02-14
Attending: PHYSICIAN ASSISTANT
Payer: SELF-PAY

## 2017-02-14 ENCOUNTER — ANESTHESIA (OUTPATIENT)
Dept: CARDIOTHORACIC SURGERY | Age: 67
DRG: 215 | End: 2017-02-14
Payer: SELF-PAY

## 2017-02-14 LAB
ALBUMIN SERPL BCP-MCNC: 2.9 G/DL (ref 3.5–5)
ALBUMIN/GLOB SERPL: 0.8 {RATIO} (ref 1.1–2.2)
ALP SERPL-CCNC: 103 U/L (ref 45–117)
ALT SERPL-CCNC: 19 U/L (ref 12–78)
AMPHET UR QL SCN: NEGATIVE
ANION GAP BLD CALC-SCNC: 7 MMOL/L (ref 5–15)
APTT PPP: 80.2 SEC (ref 22.1–32.5)
AST SERPL W P-5'-P-CCNC: 21 U/L (ref 15–37)
BARBITURATES UR QL SCN: NEGATIVE
BENZODIAZ UR QL: NEGATIVE
BILIRUB SERPL-MCNC: 0.6 MG/DL (ref 0.2–1)
BUN SERPL-MCNC: 38 MG/DL (ref 6–20)
BUN/CREAT SERPL: 24 (ref 12–20)
CALCIUM SERPL-MCNC: 8.6 MG/DL (ref 8.5–10.1)
CANNABINOIDS UR QL SCN: NEGATIVE
CHLORIDE SERPL-SCNC: 98 MMOL/L (ref 97–108)
CO2 SERPL-SCNC: 30 MMOL/L (ref 21–32)
COCAINE UR QL SCN: NEGATIVE
CREAT SERPL-MCNC: 1.61 MG/DL (ref 0.7–1.3)
DRUG SCRN COMMENT,DRGCM: ABNORMAL
ERYTHROCYTE [DISTWIDTH] IN BLOOD BY AUTOMATED COUNT: 17.7 % (ref 11.5–14.5)
GLOBULIN SER CALC-MCNC: 3.8 G/DL (ref 2–4)
GLUCOSE BLD STRIP.AUTO-MCNC: 112 MG/DL (ref 65–100)
GLUCOSE BLD STRIP.AUTO-MCNC: 177 MG/DL (ref 65–100)
GLUCOSE BLD STRIP.AUTO-MCNC: 211 MG/DL (ref 65–100)
GLUCOSE SERPL-MCNC: 107 MG/DL (ref 65–100)
HCT VFR BLD AUTO: 24.8 % (ref 36.6–50.3)
HCT VFR BLD AUTO: 25.5 % (ref 36.6–50.3)
HGB BLD-MCNC: 7.7 G/DL (ref 12.1–17)
HGB BLD-MCNC: 7.9 G/DL (ref 12.1–17)
LDH SERPL L TO P-CCNC: 367 U/L (ref 85–241)
MAGNESIUM SERPL-MCNC: 2.4 MG/DL (ref 1.6–2.4)
MCH RBC QN AUTO: 24.2 PG (ref 26–34)
MCHC RBC AUTO-ENTMCNC: 31 G/DL (ref 30–36.5)
MCV RBC AUTO: 78.2 FL (ref 80–99)
METHADONE UR QL: NEGATIVE
OPIATES UR QL: POSITIVE
PCP UR QL: NEGATIVE
PLATELET # BLD AUTO: 113 K/UL (ref 150–400)
POTASSIUM SERPL-SCNC: 3.6 MMOL/L (ref 3.5–5.1)
PROT SERPL-MCNC: 6.7 G/DL (ref 6.4–8.2)
RBC # BLD AUTO: 3.26 M/UL (ref 4.1–5.7)
SERVICE CMNT-IMP: ABNORMAL
SODIUM SERPL-SCNC: 135 MMOL/L (ref 136–145)
THERAPEUTIC RANGE,PTTT: ABNORMAL SECS (ref 58–77)
WBC # BLD AUTO: 8.4 K/UL (ref 4.1–11.1)

## 2017-02-14 PROCEDURE — 74011000258 HC RX REV CODE- 258: Performed by: PHYSICIAN ASSISTANT

## 2017-02-14 PROCEDURE — 77030020061 HC IV BLD WRMR ADMIN SET 3M -B: Performed by: NURSE ANESTHETIST, CERTIFIED REGISTERED

## 2017-02-14 PROCEDURE — 77030014008 HC SPNG HEMSTAT J&J -C: Performed by: THORACIC SURGERY (CARDIOTHORACIC VASCULAR SURGERY)

## 2017-02-14 PROCEDURE — 76010000129 HC CV SURG 1.5 TO 2 HR: Performed by: THORACIC SURGERY (CARDIOTHORACIC VASCULAR SURGERY)

## 2017-02-14 PROCEDURE — 74011250637 HC RX REV CODE- 250/637: Performed by: NURSE PRACTITIONER

## 2017-02-14 PROCEDURE — 85027 COMPLETE CBC AUTOMATED: CPT | Performed by: PHYSICIAN ASSISTANT

## 2017-02-14 PROCEDURE — 77030018822 HC SLV COMPR FT COVD -B

## 2017-02-14 PROCEDURE — 74011000250 HC RX REV CODE- 250

## 2017-02-14 PROCEDURE — 74011000272 HC RX REV CODE- 272: Performed by: THORACIC SURGERY (CARDIOTHORACIC VASCULAR SURGERY)

## 2017-02-14 PROCEDURE — 82962 GLUCOSE BLOOD TEST: CPT

## 2017-02-14 PROCEDURE — 74011636637 HC RX REV CODE- 636/637: Performed by: PHYSICIAN ASSISTANT

## 2017-02-14 PROCEDURE — 86702 HIV-2 ANTIBODY: CPT | Performed by: PHYSICIAN ASSISTANT

## 2017-02-14 PROCEDURE — 74011250636 HC RX REV CODE- 250/636

## 2017-02-14 PROCEDURE — 77010033678 HC OXYGEN DAILY

## 2017-02-14 PROCEDURE — 77030018836 HC SOL IRR NACL ICUM -A: Performed by: THORACIC SURGERY (CARDIOTHORACIC VASCULAR SURGERY)

## 2017-02-14 PROCEDURE — 83735 ASSAY OF MAGNESIUM: CPT | Performed by: PHYSICIAN ASSISTANT

## 2017-02-14 PROCEDURE — 80074 ACUTE HEPATITIS PANEL: CPT | Performed by: PHYSICIAN ASSISTANT

## 2017-02-14 PROCEDURE — 74011000250 HC RX REV CODE- 250: Performed by: THORACIC SURGERY (CARDIOTHORACIC VASCULAR SURGERY)

## 2017-02-14 PROCEDURE — 80053 COMPREHEN METABOLIC PANEL: CPT | Performed by: PHYSICIAN ASSISTANT

## 2017-02-14 PROCEDURE — 85018 HEMOGLOBIN: CPT | Performed by: THORACIC SURGERY (CARDIOTHORACIC VASCULAR SURGERY)

## 2017-02-14 PROCEDURE — 74011250636 HC RX REV CODE- 250/636: Performed by: INTERNAL MEDICINE

## 2017-02-14 PROCEDURE — 74011250637 HC RX REV CODE- 250/637: Performed by: INTERNAL MEDICINE

## 2017-02-14 PROCEDURE — 85730 THROMBOPLASTIN TIME PARTIAL: CPT | Performed by: THORACIC SURGERY (CARDIOTHORACIC VASCULAR SURGERY)

## 2017-02-14 PROCEDURE — 80307 DRUG TEST PRSMV CHEM ANLYZR: CPT | Performed by: PHYSICIAN ASSISTANT

## 2017-02-14 PROCEDURE — 02PA3RZ REMOVAL OF SHORT-TERM EXTERNAL HEART ASSIST SYSTEM FROM HEART, PERCUTANEOUS APPROACH: ICD-10-PCS | Performed by: THORACIC SURGERY (CARDIOTHORACIC VASCULAR SURGERY)

## 2017-02-14 PROCEDURE — 74011250636 HC RX REV CODE- 250/636: Performed by: THORACIC SURGERY (CARDIOTHORACIC VASCULAR SURGERY)

## 2017-02-14 PROCEDURE — 74011636637 HC RX REV CODE- 636/637: Performed by: NURSE PRACTITIONER

## 2017-02-14 PROCEDURE — 86900 BLOOD TYPING SEROLOGIC ABO: CPT | Performed by: THORACIC SURGERY (CARDIOTHORACIC VASCULAR SURGERY)

## 2017-02-14 PROCEDURE — 77030002933 HC SUT MCRYL J&J -A: Performed by: THORACIC SURGERY (CARDIOTHORACIC VASCULAR SURGERY)

## 2017-02-14 PROCEDURE — 71010 XR CHEST PORT: CPT

## 2017-02-14 PROCEDURE — 86923 COMPATIBILITY TEST ELECTRIC: CPT | Performed by: THORACIC SURGERY (CARDIOTHORACIC VASCULAR SURGERY)

## 2017-02-14 PROCEDURE — 65610000003 HC RM ICU SURGICAL

## 2017-02-14 PROCEDURE — 77030026438 HC STYL ET INTUB CARD -A: Performed by: NURSE ANESTHETIST, CERTIFIED REGISTERED

## 2017-02-14 PROCEDURE — 83615 LACTATE (LD) (LDH) ENZYME: CPT | Performed by: PHYSICIAN ASSISTANT

## 2017-02-14 PROCEDURE — 36415 COLL VENOUS BLD VENIPUNCTURE: CPT | Performed by: PHYSICIAN ASSISTANT

## 2017-02-14 PROCEDURE — 77030008684 HC TU ET CUF COVD -B: Performed by: NURSE ANESTHETIST, CERTIFIED REGISTERED

## 2017-02-14 PROCEDURE — 77030003029 HC SUT VCRL J&J -B: Performed by: THORACIC SURGERY (CARDIOTHORACIC VASCULAR SURGERY)

## 2017-02-14 PROCEDURE — 77030011640 HC PAD GRND REM COVD -A: Performed by: THORACIC SURGERY (CARDIOTHORACIC VASCULAR SURGERY)

## 2017-02-14 PROCEDURE — 76060000034 HC ANESTHESIA 1.5 TO 2 HR: Performed by: THORACIC SURGERY (CARDIOTHORACIC VASCULAR SURGERY)

## 2017-02-14 PROCEDURE — 74011250636 HC RX REV CODE- 250/636: Performed by: PHYSICIAN ASSISTANT

## 2017-02-14 PROCEDURE — 77030002986 HC SUT PROL J&J -A: Performed by: THORACIC SURGERY (CARDIOTHORACIC VASCULAR SURGERY)

## 2017-02-14 PROCEDURE — 74011250637 HC RX REV CODE- 250/637: Performed by: SPECIALIST

## 2017-02-14 RX ORDER — FENTANYL CITRATE 50 UG/ML
INJECTION, SOLUTION INTRAMUSCULAR; INTRAVENOUS AS NEEDED
Status: DISCONTINUED | OUTPATIENT
Start: 2017-02-14 | End: 2017-02-14 | Stop reason: HOSPADM

## 2017-02-14 RX ORDER — ROCURONIUM BROMIDE 10 MG/ML
INJECTION, SOLUTION INTRAVENOUS AS NEEDED
Status: DISCONTINUED | OUTPATIENT
Start: 2017-02-14 | End: 2017-02-14 | Stop reason: HOSPADM

## 2017-02-14 RX ORDER — HYDROMORPHONE HYDROCHLORIDE 1 MG/ML
0.2 INJECTION, SOLUTION INTRAMUSCULAR; INTRAVENOUS; SUBCUTANEOUS
Status: CANCELLED | OUTPATIENT
Start: 2017-02-14 | End: 2017-02-14

## 2017-02-14 RX ORDER — DIPHENHYDRAMINE HYDROCHLORIDE 50 MG/ML
12.5 INJECTION, SOLUTION INTRAMUSCULAR; INTRAVENOUS AS NEEDED
Status: CANCELLED | OUTPATIENT
Start: 2017-02-14 | End: 2017-02-14

## 2017-02-14 RX ORDER — MORPHINE SULFATE 4 MG/ML
INJECTION, SOLUTION INTRAMUSCULAR; INTRAVENOUS
Status: DISPENSED
Start: 2017-02-14 | End: 2017-02-14

## 2017-02-14 RX ORDER — SODIUM CHLORIDE 9 MG/ML
25 INJECTION, SOLUTION INTRAVENOUS CONTINUOUS
Status: CANCELLED | OUTPATIENT
Start: 2017-02-14

## 2017-02-14 RX ORDER — GLYCOPYRROLATE 0.2 MG/ML
INJECTION INTRAMUSCULAR; INTRAVENOUS AS NEEDED
Status: DISCONTINUED | OUTPATIENT
Start: 2017-02-14 | End: 2017-02-14 | Stop reason: HOSPADM

## 2017-02-14 RX ORDER — ROPIVACAINE HYDROCHLORIDE 5 MG/ML
150 INJECTION, SOLUTION EPIDURAL; INFILTRATION; PERINEURAL AS NEEDED
Status: CANCELLED | OUTPATIENT
Start: 2017-02-14

## 2017-02-14 RX ORDER — MIDAZOLAM HYDROCHLORIDE 1 MG/ML
1 INJECTION, SOLUTION INTRAMUSCULAR; INTRAVENOUS AS NEEDED
Status: CANCELLED | OUTPATIENT
Start: 2017-02-14

## 2017-02-14 RX ORDER — TRAMADOL HYDROCHLORIDE 50 MG/1
50-100 TABLET ORAL
Status: DISCONTINUED | OUTPATIENT
Start: 2017-02-14 | End: 2017-02-22 | Stop reason: HOSPADM

## 2017-02-14 RX ORDER — FENTANYL CITRATE 50 UG/ML
50 INJECTION, SOLUTION INTRAMUSCULAR; INTRAVENOUS AS NEEDED
Status: CANCELLED | OUTPATIENT
Start: 2017-02-14

## 2017-02-14 RX ORDER — SODIUM CHLORIDE 0.9 % (FLUSH) 0.9 %
5-10 SYRINGE (ML) INJECTION EVERY 8 HOURS
Status: CANCELLED | OUTPATIENT
Start: 2017-02-14

## 2017-02-14 RX ORDER — SODIUM CHLORIDE 9 MG/ML
250 INJECTION, SOLUTION INTRAVENOUS AS NEEDED
Status: DISCONTINUED | OUTPATIENT
Start: 2017-02-14 | End: 2017-02-15

## 2017-02-14 RX ORDER — PHENYLEPHRINE HCL IN 0.9% NACL 0.4MG/10ML
SYRINGE (ML) INTRAVENOUS AS NEEDED
Status: DISCONTINUED | OUTPATIENT
Start: 2017-02-14 | End: 2017-02-14 | Stop reason: HOSPADM

## 2017-02-14 RX ORDER — LIDOCAINE HYDROCHLORIDE 10 MG/ML
0.1 INJECTION, SOLUTION EPIDURAL; INFILTRATION; INTRACAUDAL; PERINEURAL AS NEEDED
Status: CANCELLED | OUTPATIENT
Start: 2017-02-14

## 2017-02-14 RX ORDER — SODIUM CHLORIDE, SODIUM LACTATE, POTASSIUM CHLORIDE, CALCIUM CHLORIDE 600; 310; 30; 20 MG/100ML; MG/100ML; MG/100ML; MG/100ML
100 INJECTION, SOLUTION INTRAVENOUS CONTINUOUS
Status: CANCELLED | OUTPATIENT
Start: 2017-02-14

## 2017-02-14 RX ORDER — SODIUM CHLORIDE 0.9 % (FLUSH) 0.9 %
5-10 SYRINGE (ML) INJECTION AS NEEDED
Status: CANCELLED | OUTPATIENT
Start: 2017-02-14

## 2017-02-14 RX ORDER — SODIUM CHLORIDE 9 MG/ML
25 INJECTION, SOLUTION INTRAVENOUS CONTINUOUS
Status: CANCELLED | OUTPATIENT
Start: 2017-02-14 | End: 2017-02-15

## 2017-02-14 RX ORDER — SODIUM CHLORIDE, SODIUM LACTATE, POTASSIUM CHLORIDE, CALCIUM CHLORIDE 600; 310; 30; 20 MG/100ML; MG/100ML; MG/100ML; MG/100ML
1000 INJECTION, SOLUTION INTRAVENOUS CONTINUOUS
Status: CANCELLED | OUTPATIENT
Start: 2017-02-14 | End: 2017-02-15

## 2017-02-14 RX ORDER — OXYCODONE HYDROCHLORIDE 5 MG/1
5 TABLET ORAL AS NEEDED
Status: CANCELLED | OUTPATIENT
Start: 2017-02-14

## 2017-02-14 RX ORDER — ONDANSETRON 2 MG/ML
4 INJECTION INTRAMUSCULAR; INTRAVENOUS AS NEEDED
Status: CANCELLED | OUTPATIENT
Start: 2017-02-14

## 2017-02-14 RX ORDER — POTASSIUM CHLORIDE 29.8 MG/ML
20 INJECTION INTRAVENOUS
Status: COMPLETED | OUTPATIENT
Start: 2017-02-14 | End: 2017-02-14

## 2017-02-14 RX ORDER — ONDANSETRON 2 MG/ML
INJECTION INTRAMUSCULAR; INTRAVENOUS AS NEEDED
Status: DISCONTINUED | OUTPATIENT
Start: 2017-02-14 | End: 2017-02-14 | Stop reason: HOSPADM

## 2017-02-14 RX ORDER — MORPHINE SULFATE 10 MG/ML
2 INJECTION, SOLUTION INTRAMUSCULAR; INTRAVENOUS
Status: CANCELLED | OUTPATIENT
Start: 2017-02-14

## 2017-02-14 RX ORDER — SODIUM CHLORIDE 9 MG/ML
INJECTION, SOLUTION INTRAVENOUS
Status: DISCONTINUED | OUTPATIENT
Start: 2017-02-14 | End: 2017-02-14 | Stop reason: HOSPADM

## 2017-02-14 RX ORDER — NEOSTIGMINE METHYLSULFATE 1 MG/ML
INJECTION INTRAVENOUS AS NEEDED
Status: DISCONTINUED | OUTPATIENT
Start: 2017-02-14 | End: 2017-02-14 | Stop reason: HOSPADM

## 2017-02-14 RX ORDER — LIDOCAINE HYDROCHLORIDE 20 MG/ML
INJECTION, SOLUTION EPIDURAL; INFILTRATION; INTRACAUDAL; PERINEURAL AS NEEDED
Status: DISCONTINUED | OUTPATIENT
Start: 2017-02-14 | End: 2017-02-14 | Stop reason: HOSPADM

## 2017-02-14 RX ORDER — SUCCINYLCHOLINE CHLORIDE 20 MG/ML
INJECTION INTRAMUSCULAR; INTRAVENOUS AS NEEDED
Status: DISCONTINUED | OUTPATIENT
Start: 2017-02-14 | End: 2017-02-14 | Stop reason: HOSPADM

## 2017-02-14 RX ORDER — FENTANYL CITRATE 50 UG/ML
25 INJECTION, SOLUTION INTRAMUSCULAR; INTRAVENOUS
Status: CANCELLED | OUTPATIENT
Start: 2017-02-14

## 2017-02-14 RX ORDER — PROPOFOL 10 MG/ML
INJECTION, EMULSION INTRAVENOUS AS NEEDED
Status: DISCONTINUED | OUTPATIENT
Start: 2017-02-14 | End: 2017-02-14 | Stop reason: HOSPADM

## 2017-02-14 RX ORDER — MIDAZOLAM HYDROCHLORIDE 1 MG/ML
0.5 INJECTION, SOLUTION INTRAMUSCULAR; INTRAVENOUS
Status: CANCELLED | OUTPATIENT
Start: 2017-02-14

## 2017-02-14 RX ORDER — MORPHINE SULFATE 4 MG/ML
4 INJECTION, SOLUTION INTRAMUSCULAR; INTRAVENOUS
Status: DISCONTINUED | OUTPATIENT
Start: 2017-02-14 | End: 2017-02-15

## 2017-02-14 RX ADMIN — PROPOFOL 100 MG: 10 INJECTION, EMULSION INTRAVENOUS at 07:53

## 2017-02-14 RX ADMIN — MILRINONE LACTATE 0.38 MCG/KG/MIN: 200 INJECTION, SOLUTION INTRAVENOUS at 02:21

## 2017-02-14 RX ADMIN — Medication 80 MCG: at 07:56

## 2017-02-14 RX ADMIN — CLOPIDOGREL BISULFATE 75 MG: 75 TABLET, FILM COATED ORAL at 11:25

## 2017-02-14 RX ADMIN — POTASSIUM CHLORIDE 20 MEQ: 400 INJECTION, SOLUTION INTRAVENOUS at 05:36

## 2017-02-14 RX ADMIN — Medication 120 MCG: at 08:04

## 2017-02-14 RX ADMIN — SODIUM CHLORIDE: 9 INJECTION, SOLUTION INTRAVENOUS at 07:53

## 2017-02-14 RX ADMIN — BUMETANIDE 2 MG: 1 TABLET ORAL at 18:40

## 2017-02-14 RX ADMIN — Medication 120 MCG: at 08:00

## 2017-02-14 RX ADMIN — PIPERACILLIN AND TAZOBACTAM 3.38 G: 3; .375 INJECTION, POWDER, FOR SOLUTION INTRAVENOUS at 14:46

## 2017-02-14 RX ADMIN — ROCURONIUM BROMIDE 10 MG: 10 INJECTION, SOLUTION INTRAVENOUS at 07:53

## 2017-02-14 RX ADMIN — SPIRONOLACTONE 25 MG: 25 TABLET, FILM COATED ORAL at 11:25

## 2017-02-14 RX ADMIN — POTASSIUM CHLORIDE 20 MEQ: 400 INJECTION, SOLUTION INTRAVENOUS at 04:43

## 2017-02-14 RX ADMIN — Medication 81 MG: at 11:25

## 2017-02-14 RX ADMIN — OXYCODONE HYDROCHLORIDE AND ACETAMINOPHEN 2 TABLET: 5; 325 TABLET ORAL at 13:02

## 2017-02-14 RX ADMIN — Medication 80 MCG: at 07:53

## 2017-02-14 RX ADMIN — NEOSTIGMINE METHYLSULFATE 2.5 MG: 1 INJECTION INTRAVENOUS at 08:44

## 2017-02-14 RX ADMIN — LIDOCAINE HYDROCHLORIDE 100 MG: 20 INJECTION, SOLUTION EPIDURAL; INFILTRATION; INTRACAUDAL; PERINEURAL at 07:53

## 2017-02-14 RX ADMIN — PIPERACILLIN AND TAZOBACTAM 3.38 G: 3; .375 INJECTION, POWDER, FOR SOLUTION INTRAVENOUS at 05:31

## 2017-02-14 RX ADMIN — SUCCINYLCHOLINE CHLORIDE 140 MG: 20 INJECTION INTRAMUSCULAR; INTRAVENOUS at 07:53

## 2017-02-14 RX ADMIN — ROCURONIUM BROMIDE 25 MG: 10 INJECTION, SOLUTION INTRAVENOUS at 08:10

## 2017-02-14 RX ADMIN — AMIODARONE HYDROCHLORIDE 200 MG: 200 TABLET ORAL at 22:06

## 2017-02-14 RX ADMIN — FENTANYL CITRATE 100 MCG: 50 INJECTION, SOLUTION INTRAMUSCULAR; INTRAVENOUS at 07:53

## 2017-02-14 RX ADMIN — INSULIN LISPRO 2 UNITS: 100 INJECTION, SOLUTION INTRAVENOUS; SUBCUTANEOUS at 16:50

## 2017-02-14 RX ADMIN — Medication 400 MG: at 11:24

## 2017-02-14 RX ADMIN — AMIODARONE HYDROCHLORIDE 200 MG: 200 TABLET ORAL at 11:25

## 2017-02-14 RX ADMIN — CARVEDILOL 3.12 MG: 3.12 TABLET, FILM COATED ORAL at 11:32

## 2017-02-14 RX ADMIN — BUMETANIDE 2 MG: 1 TABLET ORAL at 11:25

## 2017-02-14 RX ADMIN — GLYCOPYRROLATE 0.4 MG: 0.2 INJECTION INTRAMUSCULAR; INTRAVENOUS at 08:44

## 2017-02-14 RX ADMIN — GABAPENTIN 100 MG: 100 CAPSULE ORAL at 18:40

## 2017-02-14 RX ADMIN — CARVEDILOL 3.12 MG: 3.12 TABLET, FILM COATED ORAL at 18:40

## 2017-02-14 RX ADMIN — Medication 10 ML: at 22:07

## 2017-02-14 RX ADMIN — OXYCODONE HYDROCHLORIDE AND ACETAMINOPHEN 2 TABLET: 5; 325 TABLET ORAL at 01:25

## 2017-02-14 RX ADMIN — MORPHINE SULFATE 4 MG: 4 INJECTION, SOLUTION INTRAMUSCULAR; INTRAVENOUS at 09:31

## 2017-02-14 RX ADMIN — ONDANSETRON 4 MG: 2 INJECTION INTRAMUSCULAR; INTRAVENOUS at 08:44

## 2017-02-14 RX ADMIN — MILRINONE LACTATE 0.38 MCG/KG/MIN: 200 INJECTION, SOLUTION INTRAVENOUS at 13:28

## 2017-02-14 RX ADMIN — GABAPENTIN 100 MG: 100 CAPSULE ORAL at 11:24

## 2017-02-14 RX ADMIN — ATORVASTATIN CALCIUM 10 MG: 10 TABLET, FILM COATED ORAL at 11:25

## 2017-02-14 RX ADMIN — Medication 5 ML: at 22:07

## 2017-02-14 RX ADMIN — OXYCODONE HYDROCHLORIDE AND ACETAMINOPHEN 2 TABLET: 5; 325 TABLET ORAL at 05:31

## 2017-02-14 RX ADMIN — INSULIN GLARGINE 10 UNITS: 100 INJECTION, SOLUTION SUBCUTANEOUS at 11:47

## 2017-02-14 NOTE — PROGRESS NOTES
Attended IDR in CVICU where patient's condition and care were discussed.  Cindy Simmons  [place]'s Staff  ([facility])   Paging Service [phone])

## 2017-02-14 NOTE — PROGRESS NOTES
2/13/17 2000 Received report and assumed care of patient.  -Discontinue Angiomax at 0300  -Patient to OR at 0730 for groin cutdown and removal of Impella  -Will consent patient. 2/14/17    0000 Patient NPO for procedure in morning. Consent signed and witnessed by RN.    Lennox Rodriguez Impella weaned from P6 to P5.    0400 Angiomax discontinued per MD order for Impella removal this morning. Impella decreased from P5 to P4.    0600 Impella decreased from P4 to P3.     0645 Spoke to Dr David Hawthorne (Anesthesia). Update given on patient, current lines/drips. History of patient discussed and current Impella settings. 657 Estefani Higuera RN (OR) at bedside speaking to patient. Update given on patient. Per Dr Ailin Cummings, order 2 units pRBC and keep ahead two units. WILLIAM Tsai to  two units for OR.    0740 Patient transported to OR, accompanied by Perfusion, Transporters, and primary RN. 0800 Bedside shift change report given to Albert Raya RN (oncoming nurse) by Bharathi Leonard RN (offgoing nurse). Report included the following information SBAR, Kardex, Procedure Summary, Intake/Output, MAR, Recent Results and Cardiac Rhythm NSR.

## 2017-02-14 NOTE — PERIOP NOTES
Patient arrived to the operating room via stretcher. All wheels locked and patient transferred to the OR table with the assistance of four people. [facility] on OR table. Unit # [id number]. Pressure set according to the patient's weight. Temp set at 39 C and adjusted as needed. Fibrillar used. Exp:06/30/2019 RONNIE#[id number]    Fluids:  0.9 % Sodium Chloride:  2000 ml was used for warm irrigation   2000 ml was used to produce slush. Sterile water:  1000 ml in splash basin for instrument care.

## 2017-02-14 NOTE — BRIEF OP NOTE
BRIEF OPERATIVE NOTE    Date of Procedure: 2/14/2017   Preoperative Diagnosis: congestive heart failure  Postoperative Diagnosis: congestive heart failure    Procedure(s):  LEFT FEMORAL CUT DOWN AND IMPALLA REMOVAL  Surgeon(s) and Role:     * Layne Lopez MD - Primary       Physician Assistant: Frances Ledesma PA-C    Event Time In   Incision Start 3723   Incision Close 0903     Anesthesia: General   Estimated Blood Loss: 10cc  Specimens: * No specimens in log *   Findings: see op note  Complications: none

## 2017-02-14 NOTE — OP NOTES
[address]   [address] [place] [address], [address]   OP NOTE       Name:  Jovani Garibay   MR#:  [id number]   :  1950   Account #:  [de-identified]    Surgery Date:  2017   Date of Adm:  2017       PREOPERATIVE DIAGNOSIS: Previous insertion of percutaneous left   ventricular assist device (Impella device). POSTOPERATIVE DIAGNOSIS: Previous insertion of percutaneous   left ventricular assist device (Impella device). PROCEDURES PERFORMED: Removal percutaneous ventricular   assist device (Impella pump) at separate and distinct session from   insertion (CPT CODE 32742). SURGEON: Wilner Brown. Naeem Ro MD    COMPLICATIONS: None. ASSISTANT: Esha Aj PA-C    ANESTHESIA: General endotracheal anesthesia. ESTIMATED BLOOD LOSS: 10 mL. SPECIMENS REMOVED: None. PROCEDURE: The patient is a very pleasant 62-year-old gentleman   who underwent high risk PCI last week, who has a percutaneous   ventricular assist device (Impella device) for support following the   intervention. He is now being brought to the operating room to have   this removed from open exposure. The patient was brought to the   operating room and had a right radial A line placed without   complication. Next, the patient's groin was prepped and draped in the   usual sterile fashion. A counter incision was made in the patient's left   groin and cautery dissection used to dissect down to the level of the   common femoral artery, common femoral artery was then dissected   out. Next, 5-0 pursestring sutures were then placed around the Impella   device. We then turned the Impella down to P2, removed it to the edge   of the sheath and then pulled the sheath and the Impella device out   concurrently. The pursestring suture was tied down at that time. Hemostasis was applied. A layer Vicryl suture closure was then   performed in the left groin.         MD JAVI Seay / ARUNA   D:  2017   10:40 T:  02/14/2017   11:17   Job #:  [id number]

## 2017-02-14 NOTE — PROGRESS NOTES
Infectious Diseases Progress Note    Antibiotic Summary:  Levaquin  --   Vancomycin  --   Ancef    -- 2/3  Keflex   2/3 --   Zosyn   -- present      Subjective:     No new symptoms    Objective:     Vitals:   Visit Vitals    BP 94/53 (BP 1 Location: Right arm, BP Patient Position: At rest)    Pulse 83    Temp 97.9 °F (36.6 °C)    Resp 21    Ht 5' 9\" (1.753 m)    Wt 76.2 kg (167 lb 15.9 oz)    SpO2 98%    BMI 24.81 kg/m2        Tmax:  Temp (24hrs), Av.1 °F (36.7 °C), Min:97.9 °F (36.6 °C), Max:98.3 °F (36.8 °C)      Exam:  General appearance: alert, no distress  Lungs: clear  Heart: RRR  Abdomen: nontender  Left leg: no cellulitis  Right leg: no cellulitis    IV Lines: Left PICC inserted     Labs:    Recent Labs      17   1701  17   0354  17   1954  17   1226  17   0418   17   0413   WBC   --   8.9   --    --   8.7   --   7.6   HGB  8.7*  8.7*  8.5*  9.0*  9.3*   < >  9.2*   PLT   --   130*   --    --   142*   --   145*   BUN   --   36*   --    --   32*   --   35*   CREA   --   1.84*   --    --   1.62*   --   1.62*   TBILI   --   0.7   --    --   0.7   --   0.7   SGOT   --   24   --    --   26   --   22   AP   --   113   --    --   120*   --   118*    < > = values in this interval not displayed. Culture right leg ulcers  = group C Streptococcus    US abdomen 2/3 = \"Distended gallbladder with gallbladder wall thickening,  pericholecystic fluid and tenderness to probe palpation during exam. Findings  are consistent with acalculus cholecystitis\"    HIDA on  = visualization with filling of the GB, CBD, and small bowel -- GB EF 31%    Assessment:     1. Bilateral venous stasis disease of the LEs +/- cellulitis: Much better     2. RUQ tenderness and abnormal GB US: Clinically, he says the symptoms have been for months or even years. Remains on Zosyn. HIDA shows visualization of the GB with mildly decreased EF of 31%.  The pain has resolved     3. OHD -- IHD/CAD; CHF; pulm HTN     4. CRF with acute exacerbation     5. NIDDM -- new diagnosis     6. Probable COPD -- heavy smoking since age 7      9. Anemia -- HC/MC -- positive family history of colon cancer -- may need GI W/U    Plan:     1.  Continue Zosyn -- plan to stop it 2/14      Arianna Cagle MD

## 2017-02-14 NOTE — PROGRESS NOTES
Post-procedure follow up visit with patient and wife Johanne Mcnulty. Mr. RODRIGUES continues to exude gratitude for successful surgery and is looking forward to returning home. Offered he and [facility] of support and affirmation. Mr. RODRIGUES requests continued  visits. Will follow as able. [phone] Staff  (Greg Alcantara Patient Care Specialist)   Paging Service [phone])

## 2017-02-14 NOTE — PROGRESS NOTES
16:45 - 17:25    Mr Sharath Caruso had his Impella removed by femoral artery cutdown early this am.  Have been closely monitoring his BP over the course of the day. He dropped his MAPs down about 15-20 mmHg and dropped his systolic BP 26-04 mmHg since we pulled the Impella which is a little concerning. I think he definitely misses the support. Still alert and mentating well. Groin site does not show any significant hematoma. Milrinone is at 0.375. We pulled his right femoral sheath this am in the ICU using a fem-stop. There is no significant hematoma at the site. Will add vasopressin if his BP continues to drop. Will also get a Hct to make sure he is not bleeding from any of the surgical / interventional sites. Will continue the Bumex for now as I think this is really benefiting his heart failure. Family member relayed to our NP today that Mr Garry Maradiaga recreational drug use may be a little more significant than previously thought. Not sure we can send him home with a PICC line. Very complicated situation. Will need an open discussion with him about this situation prior to discharge.     Neuro - A and O x 3      Cardiovascular - acute on chronic systolic heart (NYHA class IV, stage D); EF 10-15%; milrinone at 0.375; PO Bumex 2 mg PO BID; spironolactone 25 mg QD      Pulmonary - severe COPD (FEV-1 51% predicted); pulmonary HTN; CXR - small left pleural effusion      Renal - acute on chronic renal insufficiency (stage III CKD); creatinine 1.61      Intake/Output Summary (Last 24 hours) at 02/14/17 1710  Last data filed at 02/14/17 1556   Gross per 24 hour   Intake          1021.57 ml   Output             2425 ml   Net         -1403.43 ml     (-) 700 cc over the course of the day    GI - acalculous cholecystitis / biliary akinesia; on Zosyn ; alkaline phosphatase 103; minimal abdominal pain.       Heme - + CECILIA; Hgb 7.9, platelets 276; ASA / plavix for stents      ID - Zosyn for acalculous cholecystitis; WBC 8.4; minimal lower extremity cellulitis; 1+ lower extremity edema       F/E - hyponatremia (Na 135) likely due to diuresis / CHF; hypokalemia (K 3.6) on replacement protocol      Nutrition - tolerating a normal diet      Endocrine - DM; DTC following      Total critical care time - 40 minutes (CPT 36172)

## 2017-02-14 NOTE — PROGRESS NOTES
[facility]   [address] [address] [facility], [facility] [facility], [facility]  Phone: [phone]   [phone]       Nephrology Progress Note  Marli Myles.     1950     [id number]  Date of Admission : 1/20/2017 02/14/17    CC: Follow up for CKD/ARF      Assessment and Plan   CHACORTA on CKD:  - Cr appears stable and likely at baseline  - cont current care  - diuretics reduced yesterday    Hypokalemia:  - K stable  - replete as needed    Hyponatremia :  - combination of  CHF + SIADH from chronic alcoholism  - Na stable    Acute on Chronic Systolic CHF w/ severe CMP  - echo with EF 5-10%, severely dilated LV, severe TR  - Multivessel CAD : MRI showed viable myocardium   - on Milrinone  - s/p LAD and RCA stents 2/9    Cellulitis RLE w/ Pustular lesions : resolved       Interval History:  Seen and examined. Impella removed this AM.   Good UOP, Cr stable. No cp, sob, n/v/d reported. Review of Systems: Pertinent items are noted in HPI.     Current Medications:   Current Facility-Administered Medications   Medication Dose Route Frequency    0.9% sodium chloride infusion 250 mL  250 mL IntraVENous PRN    morphine injection 4 mg  4 mg IntraVENous Q4H PRN    morphine 4 mg/mL injection        bumetanide (BUMEX) tablet 2 mg  2 mg Oral BID    DOBUTamine (DOBUTREX) 500 mg/250 mL (2,000 mcg/mL) infusion  2.5-10 mcg/kg/min IntraVENous TITRATE    DOPamine (INTROPIN) 800 mg/250 mL (3,200 mcg/mL) infusion  5-20 mcg/kg/min IntraVENous TITRATE    nitroglycerin (Tridil) 200 mcg/ml infusion  16.5 mcg/min IntraVENous CONTINUOUS    dexmedetomidine (PRECEDEX) 400 mcg in 0.9% sodium chloride 100 mL infusion  0.1-0.9 mcg/kg/hr IntraVENous TITRATE    insulin regular (NOVOLIN R, HUMULIN R) 100 Units in 0.9% sodium chloride 100 mL infusion  1-50 Units/hr IntraVENous TITRATE    PHENYLephrine (NEOSYNEPHRINE) 30,000 mcg in 0.9% sodium chloride 250 mL infusion   mcg/min IntraVENous TITRATE    niCARdipine (CARDENE) 50 mg in 0.9% sodium chloride 100 mL infusion  5-15 mg/hr IntraVENous TITRATE    amiodarone (CORDARONE) 450 mg in dextrose 5% 250 mL infusion  0.5-1 mg/min IntraVENous TITRATE    milrinone (PRIMACOR) 20 MG/100 ML D5W infusion  0.375 mcg/kg/min IntraVENous CONTINUOUS    nicotine (NICODERM CQ) 14 mg/24 hr patch 1 Patch  1 Patch TransDERmal DAILY    spironolactone (ALDACTONE) tablet 25 mg  25 mg Oral DAILY    bisacodyl (DULCOLAX) suppository 10 mg  10 mg Rectal DAILY PRN    magnesium hydroxide (MILK OF MAGNESIA) 400 mg/5 mL oral suspension 30 mL  30 mL Oral DAILY PRN    sodium chloride (NS) flush 5-10 mL  5-10 mL IntraVENous Q8H    sodium chloride (NS) flush 5-10 mL  5-10 mL IntraVENous PRN    hydrocortisone Sod Succ (PF) (SOLU-CORTEF) injection 100 mg  100 mg IntraVENous ONCE PRN    nitroglycerin (NITROSTAT) tablet 0.4 mg  0.4 mg SubLINGual Q5MIN PRN    clopidogrel (PLAVIX) tablet 75 mg  75 mg Oral DAILY    dextrose 5% infusion  14 mL/hr IntraVENous CONTINUOUS    docusate sodium (COLACE) capsule 100 mg  100 mg Oral DAILY PRN    piperacillin-tazobactam (ZOSYN) 3.375 g in 0.9% sodium chloride (MBP/ADV) 100 mL  3.375 g IntraVENous Q8H    glimepiride (AMARYL) tablet 4 mg  4 mg Oral ACB    insulin glargine (LANTUS) injection 10 Units  10 Units SubCUTAneous DAILY    gabapentin (NEURONTIN) capsule 100 mg  100 mg Oral BID    carvedilol (COREG) tablet 3.125 mg  3.125 mg Oral BID WITH MEALS    amiodarone (CORDARONE) tablet 200 mg  200 mg Oral Q12H    bacitracin 500 unit/gram packet 1 Packet  1 Packet Topical PRN    atorvastatin (LIPITOR) tablet 10 mg  10 mg Oral DAILY    0.9% sodium chloride infusion  10 mL/hr IntraVENous CONTINUOUS    acetaminophen (TYLENOL) tablet 650 mg  650 mg Oral Q6H PRN    aspirin delayed-release tablet 81 mg  81 mg Oral DAILY    glucagon (GLUCAGEN) injection 1 mg  1 mg IntraMUSCular PRN    glucose chewable tablet 16 g  4 Tab Oral PRN    insulin lispro (HUMALOG) injection   SubCUTAneous AC&HS    magnesium oxide (MAG-OX) tablet 400 mg  400 mg Oral DAILY    0.9% sodium chloride infusion  3 mL/hr IntraVENous CONTINUOUS    sodium chloride (NS) flush 5-10 mL  5-10 mL IntraVENous Q8H    sodium chloride (NS) flush 5-10 mL  5-10 mL IntraVENous PRN    albuterol (PROVENTIL VENTOLIN) nebulizer solution 2.5 mg  2.5 mg Nebulization Q4H PRN    diphenhydrAMINE (BENADRYL) capsule 25 mg  25 mg Oral QHS PRN    oxyCODONE-acetaminophen (PERCOCET) 5-325 mg per tablet 2 Tab  2 Tab Oral Q4H PRN    ELECTROLYTE REPLACEMENT PROTOCOL  1 Each Other PRN      Allergies   Allergen Reactions    Heparin (Porcine) Unknown (comments)       Objective:  Vitals:    Vitals:    02/14/17 0925 02/14/17 1000 02/14/17 1100 02/14/17 1200   BP:       Pulse: 90 92 93 93   Resp: 20 15 17 17   Temp: 97.4 °F (36.3 °C)   97.9 °F (36.6 °C)   SpO2: 96% 97% 97% 98%   Weight:       Height:         Intake and Output:  02/14 0701 - 02/14 1900  In: 100 [I.V.:100]  Out: 620 [Urine:600]  02/12 1901 - 02/14 0700  In: 3126.6 [P.O.:960; I.V.:2166.6]  Out: 6905 [Urine:6905]    Physical Examination:  General: In NAD  Resp:  Lungs CTA  CV:  RRR,  no murmur or rub,+ LE edema  GI:  Soft, NT, + Bowel sounds,  Neurologic:  Non focal  Skin:  RLE cellulitis - resolving   Ext                   Edema +,      []    High complexity decision making was performed  []    Patient is at high-risk of decompensation with multiple organ involvement    Lab Data Personally Reviewed: I have reviewed all the pertinent labs, microbiology data and radiology studies during assessment.     Recent Labs      02/14/17   0303  02/13/17   0354  02/12/17   0418   NA  135*  135*  135*   K  3.6  3.8  3.5   CL  98  98  97   CO2  30  29  28   GLU  107*  86  143*   BUN  38*  36*  32*   CREA  1.61*  1.84*  1.62*   CA  8.6  8.5  8.5   MG  2.4  2.3  2.1   ALB  2.9*  3.0*  2.9*   SGOT  21  24  26   ALT  19  19  18     Recent Labs      02/14/17   0303  02/13/17   1703 02/13/17   0354  02/12/17   1954  02/12/17   1226  02/12/17   0418   WBC  8.4   --   8.9   --    --   8.7   HGB  7.9*  8.7*  8.7*  8.5*  9.0*  9.3*   HCT  25.5*  27.8*  28.2*  28.0*  28.9*  30.0*   PLT  113*   --   130*   --    --   142*     No results found for: SDES  Lab Results   Component Value Date/Time    Culture result: NO GROWTH 5 DAYS 02/05/2017 03:22 AM    Culture result: NO GROWTH 4 DAYS 01/26/2017 03:05 PM    Culture result:  01/25/2017 09:34 PM     LIGHT  STREPTOCOCCI, BETA HEMOLYTIC GROUP C  . .. Penicillin and ampicillin are drugs of choice for treatment of beta-hemolytic streptococcal infections. Susceptibility testing of penicillins and beta-lactams approved by the FDA for treatment of beta-hemolytic streptococcal infections need not be performed routinely, because nonsusceptible isolates are extremely rare.  CLSI 2012      Culture result: MODERATE  CANDIDA TROPICALIS   01/25/2017 09:34 PM    Culture result: NO SIGNIFICANT GROWTH 01/19/2017 01:35 AM     Recent Results (from the past 24 hour(s))   GLUCOSE, POC    Collection Time: 02/13/17 12:30 PM   Result Value Ref Range    Glucose (POC) 119 (H) 65 - 100 mg/dL    Performed by [facility]    OCCULT BLOOD, STOOL    Collection Time: 02/13/17  4:55 PM   Result Value Ref Range    Occult blood, stool POSITIVE (A) NEG     HGB & HCT    Collection Time: 02/13/17  5:01 PM   Result Value Ref Range    HGB 8.7 (L) 12.1 - 17.0 g/dL    HCT 27.8 (L) 36.6 - 50.3 %   LD    Collection Time: 02/13/17  5:01 PM   Result Value Ref Range     (H) 85 - 241 U/L   PTT    Collection Time: 02/13/17  5:01 PM   Result Value Ref Range    aPTT 73.5 (H) 22.1 - 32.5 sec    aPTT, therapeutic range     58.0 - 77.0 SECS   GLUCOSE, POC    Collection Time: 02/13/17  6:13 PM   Result Value Ref Range    Glucose (POC) 299 (H) 65 - 100 mg/dL    Performed by [id number], POC    Collection Time: 02/13/17  9:22 PM   Result Value Ref Range    Glucose (POC) 173 (H) 65 - 100 mg/dL    Performed by [facility]     METABOLIC PANEL, COMPREHENSIVE    Collection Time: 02/14/17  3:03 AM   Result Value Ref Range    Sodium 135 (L) 136 - 145 mmol/L    Potassium 3.6 3.5 - 5.1 mmol/L    Chloride 98 97 - 108 mmol/L    CO2 30 21 - 32 mmol/L    Anion gap 7 5 - 15 mmol/L    Glucose 107 (H) 65 - 100 mg/dL    BUN 38 (H) 6 - 20 MG/DL    Creatinine 1.61 (H) 0.70 - 1.30 MG/DL    BUN/Creatinine ratio 24 (H) 12 - 20      GFR est AA 53 (L) >60 ml/min/1.73m2    GFR est non-AA 43 (L) >60 ml/min/1.73m2    Calcium 8.6 8.5 - 10.1 MG/DL    Bilirubin, total 0.6 0.2 - 1.0 MG/DL    ALT (SGPT) 19 12 - 78 U/L    AST (SGOT) 21 15 - 37 U/L    Alk.  phosphatase 103 45 - 117 U/L    Protein, total 6.7 6.4 - 8.2 g/dL    Albumin 2.9 (L) 3.5 - 5.0 g/dL    Globulin 3.8 2.0 - 4.0 g/dL    A-G Ratio 0.8 (L) 1.1 - 2.2     MAGNESIUM    Collection Time: 02/14/17  3:03 AM   Result Value Ref Range    Magnesium 2.4 1.6 - 2.4 mg/dL   CBC W/O DIFF    Collection Time: 02/14/17  3:03 AM   Result Value Ref Range    WBC 8.4 4.1 - 11.1 K/uL    RBC 3.26 (L) 4.10 - 5.70 M/uL    HGB 7.9 (L) 12.1 - 17.0 g/dL    HCT 25.5 (L) 36.6 - 50.3 %    MCV 78.2 (L) 80.0 - 99.0 FL    MCH 24.2 (L) 26.0 - 34.0 PG    MCHC 31.0 30.0 - 36.5 g/dL    RDW 17.7 (H) 11.5 - 14.5 %    PLATELET 045 (L) 874 - 400 K/uL   LD    Collection Time: 02/14/17  3:03 AM   Result Value Ref Range     (H) 85 - 241 U/L   TYPE & SCREEN    Collection Time: 02/14/17  3:03 AM   Result Value Ref Range    Crossmatch Expiration 02/17/2017     ABO/Rh(D) A POSITIVE     Antibody screen NEG     Unit number [id number]     Blood component type RC LR AS1     Unit division 00     Status of unit REL FROM [facility]     Crossmatch result Compatible     Unit number [id number]     Blood component type RC LR AS3     Unit division 00     Status of unit REL FROM [facility]     Crossmatch result Compatible    PTT    Collection Time: 02/14/17  3:03 AM   Result Value Ref Range    aPTT 80.2 (H) 22.1 - 32.5 sec aPTT, therapeutic range     58.0 - 77.0 SECS   GLUCOSE, POC    Collection Time: 02/14/17 11:42 AM   Result Value Ref Range    Glucose (POC) 112 (H) 65 - 100 mg/dL    Performed by Joanne Cha            I have reviewed the flowsheets. Chart and Pertinent Notes have been reviewed. No change in PMH ,family and social history from Consult note.       Ivone Monique MD

## 2017-02-14 NOTE — ANESTHESIA PREPROCEDURE EVALUATION
Anesthetic History   No history of anesthetic complications            Review of Systems / Medical History  Patient summary reviewed, nursing notes reviewed and pertinent labs reviewed    Pulmonary  Within defined limits                 Neuro/Psych   Within defined limits           Cardiovascular          CHF    CAD      Comments: ICM - EF 10%   GI/Hepatic/Renal         Renal disease: CRI       Endo/Other  Within defined limits           Other Findings            Physical Exam    Airway  Mallampati: II  TM Distance: > 6 cm  Neck ROM: normal range of motion   Mouth opening: Normal     Cardiovascular  Regular rate and rhythm,  S1 and S2 normal,  no murmur, click, rub, or gallop             Dental  No notable dental hx       Pulmonary  Breath sounds clear to auscultation               Abdominal  GI exam deferred       Other Findings            Anesthetic Plan    ASA: 4  Anesthesia type: general    Monitoring Plan: Arterial line, LUÍS, CVP and Herndon-Val      Induction: Intravenous  Anesthetic plan and risks discussed with: Patient

## 2017-02-14 NOTE — PROGRESS NOTES
Advanced Heart Failure ICU Note      Admit Date: 1/20/2017       Procedures:  SVO2 swan placed via RFV  Impella placed via LFA  PCI performed via RFA     Successful MIKE pRCA: 2.5x38 Xience + 2.75x12 Xience overlapping. Successful MIKE ost left main: 4.0x12 Xience post-dil with 4.5x12 NC. By Dr. Shade Alford    Removal percutaneous ventricular   assist device (Impella pump) at separate and distinct session from   insertion (CPT CODE 59610) on 2/14/17 by Dr. Ramya Brennan        Subjective:     Pt returned from OR this morning - anxious to get right arterial sheath out. Denies CP or SOB. Remains on   Milrinone . 375 mc/gk/gmin     IMPRESSION/Plan:   s/p PCI w/ Impella support - Imperlla removed today via femoral cut down. Cont. ASA and Plavix. On low dose Coreg,  No ACEi d/t renal dysfunction. Continue statin. Acute on chronic systolic heart failure (ICM) - Stage D, NYHA Class IV - maintian Milrinone   0.375 mcg/kg/min, cont. Attempt slow wean of milrinone, will continue low dose Coreg, no ACEi d/t renal insufficiency, Daily weights      Acalculus Cholecystitis - resolved, last day of Zosyn today per ID.      L pleural effusion - check upright x-ray tomorrow.     CECILIA - REJI positive, platelets 679 today - close f/u NO HEPARIN     Hyponatremia - stable, cont. To trend     Diabetes Mellitus - cont. Current treatment coverage, close monitor of BS. DTC following     Cellulitis - on Ancef and Zosyn to stop today per ID, Legs look much better today     H/o Tobacco abuse - smoking cessation, nicotine patch     CHACORTA on CKD3 - stable, renal following - Bumex 2 mg BID    Anemia - decreased today, trend, transfuse < 7.0     Pulmonary HTN -      Removed right femoral arterial sheath and MAC cathether without issues earlier this morning. Bleeding controlled with fem stop. 2+ R DPP pulses. Nurse will decrease fem stop per protocol. Objective:   Vitals:  Blood pressure 94/53, pulse 83, temperature 98 °F (36.7 °C), resp.  rate 12, height 5' 9\" (1.753 m), weight 167 lb 15.9 oz (76.2 kg), SpO2 95 %.   Temp (24hrs), Av.2 °F (36.8 °C), Min:98 °F (36.7 °C), Max:98.3 °F (36.8 °C)    Oxygen Therapy:  Oxygen Therapy  O2 Sat (%): 95 % (17 1000)  Pulse via Oximetry: 83 beats per minute (17 1000)  O2 Device: Nasal cannula (17)  O2 Flow Rate (L/min): 1 l/min (17)  ETCO2 (mmHg): 1 mmHg (17)    EKG: sinus tach    CXR -Minimal decreased left pleural effusion with some improved aeration in the  left lower lobe    Admission Weight: Last Weight   Weight: 178 lb 2.1 oz (80.8 kg) Weight: 167 lb 15.9 oz (76.2 kg)     Intake / Output / Drain:  Current Shift: 701 - 1900  In: 83.8 [I.V.:83.8]  Out: 600 [Urine:600]  Last 24 hrs.: 1901 - 700  In: 4496.5 [P.O.:2180; I.V.:2316.5]  Out: 8325 [Urine:8325]      EXAM:    HEENT:  EOMI       CVS:  S1/S2       LUNGS:  Decreased b/l bases, axillary                  ABD:  +BS, soft, NT/ND                  EXT:  1+ b/l LE edema, +erythema                  Neuro:  No obvious deficits       Labs:   Lab Results   Component Value Date/Time    Glucose (POC) 97 2017 07:13 AM    Glucose (POC) 112 2017 09:35 PM    Glucose (POC) 226 2017 05:19 PM    Glucose (POC) 202 2017 12:15 PM    Glucose (POC) 167 2017 08:38 AM     Recent Labs      17   0354  17   1954  17   1226  17   0418   17   0413   NA  135*   --    --   135*   --   135*   K  3.8   --    --   3.5   --   3.5   CL  98   --    --   97   --   97   CO2  29   --    --   28   --   30   BUN  36*   --    --   32*   --   35*   CREA  1.84*   --    --   1.62*   --   1.62*   GLU  86   --    --   143*   --   186*   CA  8.5   --    --   8.5   --   8.5   ALB  3.0*   --    --   2.9*   --   2.8*   WBC  8.9   --    --   8.7   --   7.6   HGB  8.7*  8.5*  9.0*  9.3*   < >  9.2*   HCT  28.2*  28.0*  28.9*  30.0*   < >  29.8*   PLT  130*   --    --   142*   --   145* < > = values in this interval not displayed.        Pt seen w/ Dr. Caryle Na and Dr. Farhat Fitch By: Karine Mcwilliams PA-C    Saw patient, agree with above  Risk of morbidity and mortality - high  Medical decision making - high complexity

## 2017-02-14 NOTE — PROGRESS NOTES
TRANSFER - IN [id number]    Verbal report received from [facility]) on [employer].  being received from OR(unit) for routine progression of care      Report consisted of patients Situation, Background, Assessment and   Recommendations(SBAR). Information from the following report(s) SBAR, OR Summary, Procedure Summary, Intake/Output and MAR was reviewed with the receiving nurse. Opportunity for questions and clarification was provided. Assessment completed upon patients arrival to unit and care assumed. 0920: Patient arrived to unit. Left groin incision is intact, no bleeding, or hematoma. 1030: ALVA Patterson at bedside, removing right femoral arterial sheath and double lumen catheter. Fem stop applied, no hematoma, pulses palpable   1300: Dr Ines Posada at bedside, updated on patient status. No new orders  1700: Dr Ines Posada at bedside, updated on patient status. New orders for H & H  1800: ALVA JENKINS updated on patient status, orders to apply duncan hose bilaterally and use foot pump.    1900: Patient complaining of groin pain, offered tramadol per ALVA JENKINS. Patient refused stating he would rather not have anything if he couldn't have percocets. 2000: Bedside and Verbal shift change [facility] [address] [place] (oncoming nurse) by Pamela Hedrick RN (offgoing nurse). Report included the following information SBAR, Intake/Output, Recent Results and Cardiac Rhythm Sinus.

## 2017-02-14 NOTE — ANESTHESIA POSTPROCEDURE EVALUATION
Post-Anesthesia Evaluation and Assessment    Patient: Jerod Vinson MRN: [id number]  SSN: [id number]    YOB: 1950  Age: 61 y.o. Sex: male       Cardiovascular Function/Vital Signs  Visit Vitals    /51    Pulse 92    Temp 36.3 °C (97.4 °F)    Resp 15    Ht 5' 9\" (1.753 m)    Wt 74.1 kg (163 lb 5.8 oz)    SpO2 97%    BMI 24.12 kg/m2       Patient is status post general anesthesia for Procedure(s):  LEFT FEMORAL CUT DOWN AND IMPALLA REMOVAL. Nausea/Vomiting: None    Postoperative hydration reviewed and adequate. Pain:  Pain Scale 1: Numeric (0 - 10) (02/14/17 0600)  Pain Intensity 1: 2 (02/14/17 0600)   Managed    Neurological Status:   Neuro  Neurologic State: Alert (02/13/17 2000)  Orientation Level: Oriented X4 (02/13/17 2000)  Cognition: Appropriate decision making; Appropriate for age attention/concentration; Appropriate safety awareness; Follows commands (02/13/17 2000)  Speech: Clear (02/13/17 2000)  Assessment L Pupil: Brisk;Round (02/03/17 0027)  Assessment R Pupil: Brisk;Round (02/03/17 0027)  LUE Motor Response: Purposeful (02/13/17 2000)  LLE Motor Response: Purposeful (02/13/17 2000)  RUE Motor Response: Purposeful (02/13/17 2000)  RLE Motor Response: Purposeful (02/13/17 2000)   At baseline    Mental Status and Level of Consciousness: Arousable    Pulmonary Status:   O2 Device: Nasal cannula (02/14/17 0922)   Adequate oxygenation and airway patent    Complications related to anesthesia: None    Post-anesthesia assessment completed.  No concerns    Signed By: Stephen Tyler MD     February 14, 2017

## 2017-02-14 NOTE — PROGRESS NOTES
Problem: Falls - Risk of  Goal: *Absence of falls  Outcome: Progressing Towards Goal  Patient verbalizes and understands bed rest due to Impella. Problem: Heart Failure: Day 5  Goal: Off Pathway (Use only if patient is Off Pathway)  Outcome: Progressing Towards Goal  Patient remains on bed rest due to Impella device and groin lines. Education provided on medications, weight monitoring, HF medications, and psychological impact. Patient on 1 L/m NC with normal O2 saturation.

## 2017-02-15 ENCOUNTER — APPOINTMENT (OUTPATIENT)
Dept: GENERAL RADIOLOGY | Age: 67
DRG: 215 | End: 2017-02-15
Attending: PHYSICIAN ASSISTANT
Payer: SELF-PAY

## 2017-02-15 ENCOUNTER — APPOINTMENT (OUTPATIENT)
Dept: CT IMAGING | Age: 67
DRG: 215 | End: 2017-02-15
Attending: PHYSICIAN ASSISTANT
Payer: SELF-PAY

## 2017-02-15 LAB
ALBUMIN SERPL BCP-MCNC: 2.8 G/DL (ref 3.5–5)
ALBUMIN/GLOB SERPL: 0.7 {RATIO} (ref 1.1–2.2)
ALP SERPL-CCNC: 98 U/L (ref 45–117)
ALT SERPL-CCNC: 17 U/L (ref 12–78)
ANION GAP BLD CALC-SCNC: 9 MMOL/L (ref 5–15)
APTT PPP: 28.2 SEC (ref 22.1–32.5)
AST SERPL W P-5'-P-CCNC: 15 U/L (ref 15–37)
BILIRUB SERPL-MCNC: 0.4 MG/DL (ref 0.2–1)
BUN SERPL-MCNC: 41 MG/DL (ref 6–20)
BUN/CREAT SERPL: 21 (ref 12–20)
CALCIUM SERPL-MCNC: 8.5 MG/DL (ref 8.5–10.1)
CHLORIDE SERPL-SCNC: 97 MMOL/L (ref 97–108)
CO2 SERPL-SCNC: 27 MMOL/L (ref 21–32)
CREAT SERPL-MCNC: 1.98 MG/DL (ref 0.7–1.3)
ERYTHROCYTE [DISTWIDTH] IN BLOOD BY AUTOMATED COUNT: 17.9 % (ref 11.5–14.5)
GLOBULIN SER CALC-MCNC: 3.9 G/DL (ref 2–4)
GLUCOSE BLD STRIP.AUTO-MCNC: 165 MG/DL (ref 65–100)
GLUCOSE BLD STRIP.AUTO-MCNC: 181 MG/DL (ref 65–100)
GLUCOSE BLD STRIP.AUTO-MCNC: 194 MG/DL (ref 65–100)
GLUCOSE BLD STRIP.AUTO-MCNC: 219 MG/DL (ref 65–100)
GLUCOSE SERPL-MCNC: 167 MG/DL (ref 65–100)
HAV IGM SERPL QL IA: NONREACTIVE
HBV CORE IGM SER QL: NONREACTIVE
HBV SURFACE AG SER QL: 0.25 INDEX
HBV SURFACE AG SER QL: NEGATIVE
HCT VFR BLD AUTO: 23.1 % (ref 36.6–50.3)
HCT VFR BLD AUTO: 24.6 % (ref 36.6–50.3)
HCV AB SER IA-ACNC: >11.9 INDEX
HCV AB SERPL QL IA: REACTIVE
HCV COMMENT,HCGAC: ABNORMAL
HGB BLD-MCNC: 7.4 G/DL (ref 12.1–17)
HGB BLD-MCNC: 7.7 G/DL (ref 12.1–17)
INR PPP: 1.2 (ref 0.9–1.1)
MAGNESIUM SERPL-MCNC: 2.5 MG/DL (ref 1.6–2.4)
MCH RBC QN AUTO: 24.9 PG (ref 26–34)
MCHC RBC AUTO-ENTMCNC: 32 G/DL (ref 30–36.5)
MCV RBC AUTO: 77.8 FL (ref 80–99)
PLATELET # BLD AUTO: 97 K/UL (ref 150–400)
POTASSIUM SERPL-SCNC: 4.3 MMOL/L (ref 3.5–5.1)
PROT SERPL-MCNC: 6.7 G/DL (ref 6.4–8.2)
PROTHROMBIN TIME: 12 SEC (ref 9–11.1)
RBC # BLD AUTO: 2.97 M/UL (ref 4.1–5.7)
SERVICE CMNT-IMP: ABNORMAL
SODIUM SERPL-SCNC: 133 MMOL/L (ref 136–145)
SP1: ABNORMAL
SP2: ABNORMAL
SP3: ABNORMAL
THERAPEUTIC RANGE,PTTT: NORMAL SECS (ref 58–77)
WBC # BLD AUTO: 7.7 K/UL (ref 4.1–11.1)

## 2017-02-15 PROCEDURE — 30233N1 TRANSFUSION OF NONAUTOLOGOUS RED BLOOD CELLS INTO PERIPHERAL VEIN, PERCUTANEOUS APPROACH: ICD-10-PCS | Performed by: THORACIC SURGERY (CARDIOTHORACIC VASCULAR SURGERY)

## 2017-02-15 PROCEDURE — 74176 CT ABD & PELVIS W/O CONTRAST: CPT

## 2017-02-15 PROCEDURE — 74011250637 HC RX REV CODE- 250/637: Performed by: THORACIC SURGERY (CARDIOTHORACIC VASCULAR SURGERY)

## 2017-02-15 PROCEDURE — 74011250637 HC RX REV CODE- 250/637: Performed by: PHYSICIAN ASSISTANT

## 2017-02-15 PROCEDURE — 82962 GLUCOSE BLOOD TEST: CPT

## 2017-02-15 PROCEDURE — 85018 HEMOGLOBIN: CPT | Performed by: PHYSICIAN ASSISTANT

## 2017-02-15 PROCEDURE — 74011250637 HC RX REV CODE- 250/637: Performed by: INTERNAL MEDICINE

## 2017-02-15 PROCEDURE — 77030013797 HC KT TRNSDUC PRSSR EDWD -A

## 2017-02-15 PROCEDURE — 74011250637 HC RX REV CODE- 250/637: Performed by: NURSE PRACTITIONER

## 2017-02-15 PROCEDURE — 74011250637 HC RX REV CODE- 250/637: Performed by: SPECIALIST

## 2017-02-15 PROCEDURE — 85027 COMPLETE CBC AUTOMATED: CPT | Performed by: PHYSICIAN ASSISTANT

## 2017-02-15 PROCEDURE — 80053 COMPREHEN METABOLIC PANEL: CPT | Performed by: PHYSICIAN ASSISTANT

## 2017-02-15 PROCEDURE — 74011636637 HC RX REV CODE- 636/637: Performed by: NURSE PRACTITIONER

## 2017-02-15 PROCEDURE — 77010033678 HC OXYGEN DAILY

## 2017-02-15 PROCEDURE — 71010 XR CHEST PORT: CPT

## 2017-02-15 PROCEDURE — 74011636637 HC RX REV CODE- 636/637: Performed by: PHYSICIAN ASSISTANT

## 2017-02-15 PROCEDURE — 36430 TRANSFUSION BLD/BLD COMPNT: CPT

## 2017-02-15 PROCEDURE — 74011250636 HC RX REV CODE- 250/636: Performed by: INTERNAL MEDICINE

## 2017-02-15 PROCEDURE — 65660000000 HC RM CCU STEPDOWN

## 2017-02-15 PROCEDURE — P9016 RBC LEUKOCYTES REDUCED: HCPCS | Performed by: THORACIC SURGERY (CARDIOTHORACIC VASCULAR SURGERY)

## 2017-02-15 PROCEDURE — 36415 COLL VENOUS BLD VENIPUNCTURE: CPT | Performed by: PHYSICIAN ASSISTANT

## 2017-02-15 PROCEDURE — 85610 PROTHROMBIN TIME: CPT | Performed by: PHYSICIAN ASSISTANT

## 2017-02-15 PROCEDURE — 83735 ASSAY OF MAGNESIUM: CPT | Performed by: PHYSICIAN ASSISTANT

## 2017-02-15 PROCEDURE — 85730 THROMBOPLASTIN TIME PARTIAL: CPT | Performed by: THORACIC SURGERY (CARDIOTHORACIC VASCULAR SURGERY)

## 2017-02-15 RX ORDER — MAGNESIUM SULFATE 100 %
4 CRYSTALS MISCELLANEOUS AS NEEDED
Status: DISCONTINUED | OUTPATIENT
Start: 2017-02-15 | End: 2017-02-22 | Stop reason: HOSPADM

## 2017-02-15 RX ORDER — INSULIN LISPRO 100 [IU]/ML
INJECTION, SOLUTION INTRAVENOUS; SUBCUTANEOUS
Status: DISCONTINUED | OUTPATIENT
Start: 2017-02-15 | End: 2017-02-15 | Stop reason: SDUPTHER

## 2017-02-15 RX ORDER — SODIUM CHLORIDE 0.9 % (FLUSH) 0.9 %
SYRINGE (ML) INJECTION
Status: DISPENSED
Start: 2017-02-15 | End: 2017-02-16

## 2017-02-15 RX ORDER — INSULIN LISPRO 100 [IU]/ML
INJECTION, SOLUTION INTRAVENOUS; SUBCUTANEOUS
Status: DISCONTINUED | OUTPATIENT
Start: 2017-02-15 | End: 2017-02-22 | Stop reason: HOSPADM

## 2017-02-15 RX ORDER — OXYCODONE AND ACETAMINOPHEN 5; 325 MG/1; MG/1
2 TABLET ORAL
Status: DISCONTINUED | OUTPATIENT
Start: 2017-02-15 | End: 2017-02-22 | Stop reason: HOSPADM

## 2017-02-15 RX ORDER — DEXTROSE 50 % IN WATER (D50W) INTRAVENOUS SYRINGE
12.5-25 AS NEEDED
Status: DISCONTINUED | OUTPATIENT
Start: 2017-02-15 | End: 2017-02-22 | Stop reason: HOSPADM

## 2017-02-15 RX ORDER — SODIUM CHLORIDE 9 MG/ML
250 INJECTION, SOLUTION INTRAVENOUS AS NEEDED
Status: DISCONTINUED | OUTPATIENT
Start: 2017-02-15 | End: 2017-02-15

## 2017-02-15 RX ADMIN — ATORVASTATIN CALCIUM 10 MG: 10 TABLET, FILM COATED ORAL at 09:37

## 2017-02-15 RX ADMIN — OXYCODONE HYDROCHLORIDE AND ACETAMINOPHEN 2 TABLET: 5; 325 TABLET ORAL at 12:19

## 2017-02-15 RX ADMIN — GABAPENTIN 100 MG: 100 CAPSULE ORAL at 09:37

## 2017-02-15 RX ADMIN — MILRINONE LACTATE 0.38 MCG/KG/MIN: 200 INJECTION, SOLUTION INTRAVENOUS at 00:38

## 2017-02-15 RX ADMIN — BUMETANIDE 2 MG: 1 TABLET ORAL at 18:19

## 2017-02-15 RX ADMIN — GLIMEPIRIDE 4 MG: 2 TABLET ORAL at 07:13

## 2017-02-15 RX ADMIN — Medication 10 ML: at 14:03

## 2017-02-15 RX ADMIN — AMIODARONE HYDROCHLORIDE 200 MG: 200 TABLET ORAL at 09:37

## 2017-02-15 RX ADMIN — OXYCODONE HYDROCHLORIDE AND ACETAMINOPHEN 2 TABLET: 5; 325 TABLET ORAL at 21:49

## 2017-02-15 RX ADMIN — OXYCODONE HYDROCHLORIDE AND ACETAMINOPHEN 1 TABLET: 5; 325 TABLET ORAL at 07:22

## 2017-02-15 RX ADMIN — GABAPENTIN 100 MG: 100 CAPSULE ORAL at 18:19

## 2017-02-15 RX ADMIN — INSULIN LISPRO 2 UNITS: 100 INJECTION, SOLUTION INTRAVENOUS; SUBCUTANEOUS at 12:20

## 2017-02-15 RX ADMIN — Medication 81 MG: at 09:37

## 2017-02-15 RX ADMIN — OXYCODONE HYDROCHLORIDE AND ACETAMINOPHEN 1 TABLET: 5; 325 TABLET ORAL at 04:53

## 2017-02-15 RX ADMIN — SPIRONOLACTONE 25 MG: 25 TABLET, FILM COATED ORAL at 09:37

## 2017-02-15 RX ADMIN — AMIODARONE HYDROCHLORIDE 200 MG: 200 TABLET ORAL at 20:33

## 2017-02-15 RX ADMIN — Medication 10 ML: at 06:13

## 2017-02-15 RX ADMIN — INSULIN GLARGINE 10 UNITS: 100 INJECTION, SOLUTION SUBCUTANEOUS at 09:48

## 2017-02-15 RX ADMIN — CARVEDILOL 3.12 MG: 3.12 TABLET, FILM COATED ORAL at 18:19

## 2017-02-15 RX ADMIN — Medication 400 MG: at 09:37

## 2017-02-15 RX ADMIN — CARVEDILOL 3.12 MG: 3.12 TABLET, FILM COATED ORAL at 09:37

## 2017-02-15 RX ADMIN — BUMETANIDE 2 MG: 1 TABLET ORAL at 09:37

## 2017-02-15 RX ADMIN — CLOPIDOGREL BISULFATE 75 MG: 75 TABLET, FILM COATED ORAL at 09:37

## 2017-02-15 NOTE — PROGRESS NOTES
[facility] Progress Note      NAME:  Fernanda Doe. :   1950   MRN:   [id number]   PCP:  None  CARD:   Dr. Jessica Fraga    Date:  February 15, 2017     Fernanda Low is a 61 y.o. male with a who presented for further evaluation of severe CAD and systolic heart failure. Subjective:   He was initially seen at [facility] 3 years ago and told that he had heart failure with LVEF 30%. He was lost to follow up due to lack of insurance and has not been seen by a physician in the interim. He complains of progressive fatigue, NAAVRRO, PND, and edema over the past year, prompting presentation to [facility]. He also complained of lower extremity bullae, weeping, and pain. He denied palpitations, presyncope, syncope, or chest pain. Past 24 hours:  Sitting in chair  Denies dyspnea, edema, groin pain      Objective:     Visit Vitals    /58 (BP 1 Location: Right arm, BP Patient Position: At rest)    Pulse 80    Temp 97.1 °F (36.2 °C)    Resp 18    Ht 5' 9\" (1.753 m)    Wt 75.1 kg (165 lb 9.1 oz)    SpO2 95%    BMI 24.45 kg/m2      Hemodynamics:  CVP 5 mmHg    General:  fatigued    HEENT: Normocephalic, EOMI, PERRLA, Hearing intact, trachea mid-line    Neck:  supple, no significant adenopathy, carotids upstroke normal bilaterally, no bruits    CVP:  5 cm      Heart:  Diminished PMI    Normal S1 and S2, S3 gallop    Murmur: 1/6 systolic murmur    Lungs: Diminished breath sounds left lower lung    Abdomen: soft, non-tender. Bowel sounds normal. +HSM    Extremity: Trace edema bilaterally    Neuro: Alert and oriented to person, place, and time; normal strength and tone. Normal symmetric reflexes. Normal coordination and gait      1901 - 02/15 0700  In: 0971 [P.O.:240;  I.V.:1403]  Out: 3700 [Urine:3680]  O2 Flow Rate (L/min): 2 l/min O2 Device: Room air  Temp (24hrs), Av.8 °F (36.6 °C), Min:97.1 °F (36.2 °C), Max:98.9 °F (37.2 °C)          Care Plan discussed with:    Comments Patient x    Family      RN x    Care Manager                    Consultant:          Past History:     Past Medical History   Diagnosis Date    Cardiomyopathy ([address] 1/20/2017     A. Echo (1/19/17):  EF 5-10% with severe GHK,. Mildly dil LA. Mild TR. PASP 46. History reviewed. No pertinent past surgical history. Social History   Substance Use Topics    Smoking status: Not on file    Smokeless tobacco: Not on file    Alcohol use Not on file        History reviewed. No pertinent family history. Allergies: Allergies   Allergen Reactions    Heparin (Porcine) Unknown (comments)          Data Review:     CXR:   CXR Results  (Last 48 hours)               02/15/17 0432  XR CHEST PORT Final result    Impression:  IMPRESSION:   1. No significant change in the appearance of the chest and remaining support   hardware. Narrative:  INDICATION: . impella, CHF,   COMPARISON: Previous chest xray, yesterday. LIMITATIONS: Portable technique. Manisha Nuris FINDINGS: Single frontal view of the chest.    .   Lines/tubes/surgical: Left-sided PICC. Interval removal of a balloon pump. Heart/mediastinum: Calcifications in the aortic arch. Lungs/pleura: Diffuse interstitial prominence. Opacity of the left midlung and   left base with obscured left hemidiaphragm. No visible pneumothorax. Additional Comments: None. Manisha Nuris 02/14/17 0416  XR CHEST PORT Final result    Impression:  IMPRESSION:   1. Minimal decreased left pleural effusion with some improved aeration in the   left lower lobe. Narrative:  EXAM:  XR CHEST PORT       INDICATION:  impella, CHF,       COMPARISON:  2/13/2017       FINDINGS: A portable AP radiograph of the chest was obtained at 0357 hours. The   patient is on a cardiac monitor. The PICC line overlies the SVC. The cardiac   assist device, Impella, is unchanged.  The left pleural effusion and left lower   lobe atelectasis persists but there has been some improvement in both.                   Echocardiogram:   Echo Results  (Last 48 hours)    None          No results found for this visit on 01/20/17. ECG:  EKG: ST with occasional PVC, NSIVCD, LAE    LABS:  Recent Results (from the past 24 hour(s))   DRUG SCREEN, URINE    Collection Time: 02/14/17  5:24 PM   Result Value Ref Range    AMPHETAMINE NEGATIVE  NEG      BARBITURATES NEGATIVE  NEG      BENZODIAZEPINE NEGATIVE  NEG      COCAINE NEGATIVE  NEG      METHADONE NEGATIVE  NEG      OPIATES POSITIVE (A) NEG      PCP(PHENCYCLIDINE) NEGATIVE  NEG      THC (TH-CANNABINOL) NEGATIVE  NEG      Drug screen comment (NOTE)    HGB & HCT    Collection Time: 02/14/17  5:40 PM   Result Value Ref Range    HGB 7.7 (L) 12.1 - 17.0 g/dL    HCT 24.8 (L) 36.6 - 50.3 %   GLUCOSE, POC    Collection Time: 02/14/17 10:07 PM   Result Value Ref Range    Glucose (POC) 177 (H) 65 - 100 mg/dL    Performed by Dex Ortiz    METABOLIC PANEL, COMPREHENSIVE    Collection Time: 02/15/17  4:24 AM   Result Value Ref Range    Sodium 133 (L) 136 - 145 mmol/L    Potassium 4.3 3.5 - 5.1 mmol/L    Chloride 97 97 - 108 mmol/L    CO2 27 21 - 32 mmol/L    Anion gap 9 5 - 15 mmol/L    Glucose 167 (H) 65 - 100 mg/dL    BUN 41 (H) 6 - 20 MG/DL    Creatinine 1.98 (H) 0.70 - 1.30 MG/DL    BUN/Creatinine ratio 21 (H) 12 - 20      GFR est AA 41 (L) >60 ml/min/1.73m2    GFR est non-AA 34 (L) >60 ml/min/1.73m2    Calcium 8.5 8.5 - 10.1 MG/DL    Bilirubin, total 0.4 0.2 - 1.0 MG/DL    ALT (SGPT) 17 12 - 78 U/L    AST (SGOT) 15 15 - 37 U/L    Alk.  phosphatase 98 45 - 117 U/L    Protein, total 6.7 6.4 - 8.2 g/dL    Albumin 2.8 (L) 3.5 - 5.0 g/dL    Globulin 3.9 2.0 - 4.0 g/dL    A-G Ratio 0.7 (L) 1.1 - 2.2     MAGNESIUM    Collection Time: 02/15/17  4:24 AM   Result Value Ref Range    Magnesium 2.5 (H) 1.6 - 2.4 mg/dL   CBC W/O DIFF    Collection Time: 02/15/17  4:24 AM   Result Value Ref Range    WBC 7.7 4.1 - 11.1 K/uL    RBC 2.97 (L) 4.10 - 5.70 M/uL    HGB 7.4 (L) 12.1 - 17.0 g/dL    HCT 23.1 (L) 36.6 - 50.3 %    MCV 77.8 (L) 80.0 - 99.0 FL    MCH 24.9 (L) 26.0 - 34.0 PG    MCHC 32.0 30.0 - 36.5 g/dL    RDW 17.9 (H) 11.5 - 14.5 %    PLATELET 97 (L) 158 - 400 K/uL   PTT    Collection Time: 02/15/17  4:24 AM   Result Value Ref Range    aPTT 28.2 22.1 - 32.5 sec    aPTT, therapeutic range     58.0 - 77.0 SECS   GLUCOSE, POC    Collection Time: 02/15/17  7:18 AM   Result Value Ref Range    Glucose (POC) 181 (H) 65 - 100 mg/dL    Performed by Brittany Bryant, POC    Collection Time: 02/15/17 12:00 PM   Result Value Ref Range    Glucose (POC) 219 (H) 65 - 100 mg/dL    Performed by Napoleon Diego    GLUCOSE, POC    Collection Time: 02/15/17  4:48 PM   Result Value Ref Range    Glucose (POC) 194 (H) 65 - 100 mg/dL    Performed by [address] Results     Procedure Component Value Units Date/Time    CULTURE, BLOOD, PAIRED [[id number]] Collected:  02/05/17 0322    Order Status:  Completed Specimen:  Blood Updated:  02/10/17 0542     Special Requests: NO SPECIAL REQUESTS        Culture result: NO GROWTH 5 DAYS       CULTURE, BODY FLUID Carole Nicholson STAIN [[id number]] Collected:  01/26/17 1505    Order Status:  Completed Specimen:  Thoracentesis Updated:  01/30/17 1057     Special Requests: NO SPECIAL REQUESTS        GRAM STAIN OCCASIONAL  WBCS SEEN         NO ORGANISMS SEEN        Culture result: NO GROWTH 4 DAYS       CULTURE, Elsie Delgado STAIN [[id number]] Collected:  01/25/17 2134    Order Status:  Completed Specimen:  Leg Updated:  01/27/17 1440     Special Requests: NO SPECIAL REQUESTS        GRAM STAIN RARE  WBCS SEEN         NO ORGANISMS SEEN        Culture result: LIGHT  STREPTOCOCCI, BETA HEMOLYTIC GROUP C  . .. Penicillin and ampicillin are drugs of choice for treatment of beta-hemolytic streptococcal infections.  Susceptibility testing of penicillins and beta-lactams approved by the FDA for treatment of beta-hemolytic streptococcal infections need not be performed routinely, because nonsusceptible isolates are extremely rare. CLSI 2012         MODERATE  BETTIE TROPICALIS           Abdominal Ultrasound 2/4/17  IMPRESSION: Distended gallbladder with gallbladder wall thickening,  pericholecystic fluid and tenderness to probe palpation during exam. Findings  are consistent with acalculus cholecystitis. Thoracentesis 1/26/17  Specimen Source   1: Pleural Fluid   CYTOLOGIC INTERPRETATION:   Scattered mesothelial cells with degenerative changes and mixed inflammatory cells   General Categorization   No cells diagnostic for malignancy   Specimen Adequacy   Satisfactory for evaluation       Cardiac MRI 1/31/17  IMPRESSION  1. Markedly dilated left ventricle with 3-D end-diastolic volume index to body  surface area of 153 mL/sq m. Mild eccentric left ventricular hypertrophy with  3-D mass index to body surface area of 85 g/sq m. Severe left ventricular  systolic dysfunction. Severe global hypokinesis with regional variation. 3-D  LVEF 10%. 2. Moderately dilated right ventricle by 3-D volumetric assessment. RV end  diastolic volume indexed to body surface area of 138 mL/sq m. Moderate to severe  right ventricular systolic dysfunction. Global hypokinesis. 3-D RVEF 25%. 3. Apical a tethered mitral valve leaflets. Mild mitral regurgitation. 4. Moderately severe 3+ tricuspid regurgitation. 5. On LGE study, the anterior wall, anteroseptal wall, anteroapical wall, and  anterior lateral wall are largely viable without significant myocardial  infarction. The entire inferior wall and inferoseptal wall are completely viable  without any infarct. The base to mid inferolateral wall demonstrate a near  transmural greater than 75% thickness infarct with minimal or no viable  myocardium in this territory. The distal inferolateral wall, apical lateral wall  does not demonstrate any infarct and are largely viable.  Based on the viability  imaging, the entire LAD territory is largely viable and should recover upon  revascularization. The entire RCA territory is largely viable and should recover  upon revascularization. The LCx territory demonstrate medium-size infarct with  limited viability. 6. Large left-sided pleural effusion with passive atelectasis of the left lung. 7. Dilated branch pulmonary arteries suggest pulmonary hypertension. 8. Markedly dilated left atrium measuring 59 x 55 mm. Moderately dilated right  atrium measuring 46 x 56 mm.     Medications reviewed:    Current Facility-Administered Medications   Medication Dose Route Frequency    [START ON 2/16/2017] insulin lispro protamine/insulin lispro (HUMALOG MIX 75/25) injection 10 Units  10 Units SubCUTAneous ACB    [START ON 2/16/2017] insulin lispro protamine/insulin lispro (HUMALOG MIX 75/25) injection 8 Units  8 Units SubCUTAneous ACD    glucose chewable tablet 16 g  4 Tab Oral PRN    dextrose (D50W) injection syrg 12.5-25 g  12.5-25 g IntraVENous PRN    glucagon (GLUCAGEN) injection 1 mg  1 mg IntraMUSCular PRN    insulin lispro (HUMALOG) injection   SubCUTAneous AC&HS    traMADol (ULTRAM) tablet  mg   mg Oral Q4H PRN    bumetanide (BUMEX) tablet 2 mg  2 mg Oral BID    milrinone (PRIMACOR) 20 MG/100 ML D5W infusion  0.375 mcg/kg/min IntraVENous CONTINUOUS    nicotine (NICODERM CQ) 14 mg/24 hr patch 1 Patch  1 Patch TransDERmal DAILY    spironolactone (ALDACTONE) tablet 25 mg  25 mg Oral DAILY    bisacodyl (DULCOLAX) suppository 10 mg  10 mg Rectal DAILY PRN    magnesium hydroxide (MILK OF MAGNESIA) 400 mg/5 mL oral suspension 30 mL  30 mL Oral DAILY PRN    clopidogrel (PLAVIX) tablet 75 mg  75 mg Oral DAILY    docusate sodium (COLACE) capsule 100 mg  100 mg Oral DAILY PRN    gabapentin (NEURONTIN) capsule 100 mg  100 mg Oral BID    carvedilol (COREG) tablet 3.125 mg  3.125 mg Oral BID WITH MEALS    amiodarone (CORDARONE) tablet 200 mg  200 mg Oral Q12H    atorvastatin (LIPITOR) tablet 10 mg  10 mg Oral DAILY    acetaminophen (TYLENOL) tablet 650 mg  650 mg Oral Q6H PRN    aspirin delayed-release tablet 81 mg  81 mg Oral DAILY    magnesium oxide (MAG-OX) tablet 400 mg  400 mg Oral DAILY    albuterol (PROVENTIL VENTOLIN) nebulizer solution 2.5 mg  2.5 mg Nebulization Q4H PRN    diphenhydrAMINE (BENADRYL) capsule 25 mg  25 mg Oral QHS PRN    ELECTROLYTE REPLACEMENT PROTOCOL  1 Each Other PRN     HIDA Scan 2/7/17  Gallbladder emptying study was performed per protocol utilizing 5. 2mCi Tc-99 M  Choletec and 1.86 mcg CCK intravenously. There is normal tracer distribution  throughout the liver. Activity is noted in the gallbladder, common bile duct,  and small bowel. The gallbladder ejection fraction is 31% (normal greater than  or equal to 35%).    IMPRESSION: Abnormal gallbladder emptying study with an EF of only 31%.        IMPRESSION / PLAN:    1. CAD - severe native vessel disease, s/p PCI for  of RCA and left main with Impella support   Stable on ASA, clopidogrel, BB, and statin    2. Acute on chronic systolic heart failure (ICM with LVEF 5-10%) - Stage D, NYHA Class IV   Currently inotropic dependent, repeat echo with improved LVEF 20-24%   On IV milrinone 0.375, bumex 2 mg bid, and spironolactone 25 mg daily   Transfer to CVSU    3. Paroxysmal atrial fibrillation   Resume warfarin - goal INR 2-3    4. DM2 with Hga1c 13.5   On lantus, SSI, and glimepiride   Diabetic education   Accuchecks    5. Cellutitis   Resolved after treatment with cefazolin    6. CKD3   Serum creatinine improving on inotropic support and Impella   Continue diuresis    7. COPD with FEV1 51% predicted    8. Acalculous CHolecystitis   Completed course of IV zosyn     9. Hyponatremia   Improved with diuresis    10. Nicotine Addiction   Smoking cessation counseling   Nicotine patch - reduce dose to 14 mg/24 hour    11. CECILIA    Hgb 7.4, plt 97   Transfuse 1 unit prbcs    Dorie Ellis MD, [address] [address] Director    Daysi Bhat  [address] [place] [address], [address] [facility], [address] [place] [address]  Office [phone]  Fax [phone]  24 hour VAD/HF Pager: [phone]

## 2017-02-15 NOTE — PROGRESS NOTES
Problem: Falls - Risk of  Goal: *Absence of falls  Outcome: Progressing Towards Goal  Pt us x 1 for standby , call bell within reach, non slip footwear in place    Problem: Pressure Ulcer - Risk of  Goal: *Prevention of pressure ulcer  Outcome: Progressing Towards Goal  Pt remains free of pressure ulcers    Problem: Patient Education: Go to Patient Education Activity  Goal: Patient/Family Education  Outcome: Progressing Towards Goal  Pt moves independently in bed

## 2017-02-15 NOTE — PROGRESS NOTES
Primary Nurse Geovanni Robert RN and Antionette Coates RN performed a dual skin assessment on this patient No impairment noted  Trey score is 17

## 2017-02-15 NOTE — PROGRESS NOTES
Infectious Diseases Progress Note    Antibiotic Summary:  Levaquin  --   Vancomycin  --   Ancef    -- 2/3  Keflex   2/3 --   Zosyn   --       Subjective:     He is resting comfortably and he feels well. No SOB at rest.    Objective:     Vitals:   Visit Vitals    /51    Pulse 86    Temp 97.9 °F (36.6 °C)    Resp 23    Ht 5' 9\" (1.753 m)    Wt 74.1 kg (163 lb 5.8 oz)    SpO2 100%    BMI 24.12 kg/m2        Tmax:  Temp (24hrs), Av.9 °F (36.6 °C), Min:97.4 °F (36.3 °C), Max:98.3 °F (36.8 °C)      Exam:  General appearance: alert, no distress  Lungs: clear  Heart: RRR  Abdomen: nontender  Left leg: cellulitis resolved  Right leg: cellulitis resolved    IV Lines: Left PICC inserted     Labs:    Recent Labs      17   1740  17   0303  17   1701  17   0354  17   1954   17   0418   WBC   --   8.4   --   8.9   --    --   8.7   HGB  7.7*  7.9*  8.7*  8.7*  8.5*   < >  9.3*   PLT   --   113*   --   130*   --    --   142*   BUN   --   38*   --   36*   --    --   32*   CREA   --   1.61*   --   1.84*   --    --   1.62*   TBILI   --   0.6   --   0.7   --    --   0.7   SGOT   --   21   --   24   --    --   26   AP   --   103   --   113   --    --   120*    < > = values in this interval not displayed. Culture right leg ulcers  = group C Streptococcus    US abdomen 2/3 = \"Distended gallbladder with gallbladder wall thickening,  pericholecystic fluid and tenderness to probe palpation during exam. Findings  are consistent with acalculus cholecystitis\"    HIDA on  = visualization with filling of the GB, CBD, and small bowel -- GB EF 31%    Assessment:     1. Bilateral venous stasis disease of the LEs +/- cellulitis: Much better     2. RUQ tenderness and abnormal GB US: Clinically, he says the symptoms have been for months or even years. Remains on Zosyn. HIDA shows visualization of the GB with mildly decreased EF of 31%.  The pain has resolved for the first time in \"5 years\"     3. OHD -- IHD/CAD; CHF; pulm HTN     4. CRF with acute exacerbation     5. NIDDM -- new diagnosis     6. Probable COPD -- heavy smoking since age 7      9. Anemia -- HC/MC -- positive family history of colon cancer -- may need GI W/U    Plan:     1.  Digna Alcantar MD

## 2017-02-15 NOTE — PROGRESS NOTES
Per Dr. Heather Villareal, pt can be transferred to step down today. Arterial line also removed per verbal order given by Dr. Heather Villareal. Occlusive dressing applied after pressure held. No s/sx of hematoma or bleeding.

## 2017-02-15 NOTE — PROGRESS NOTES
[facility] Progress Note      NAME:  Fernanda Villela. :   1950   MRN:   [id number]   PCP:  None  CARD:   Dr. Ranjana Villalba    Date:  2017     Fernanda Villela. is a 61 y.o. male with a who presented for further evaluation of severe CAD and systolic heart failure. Subjective:   He was initially seen at [facility] 3 years ago and told that he had heart failure with LVEF 30%. He was lost to follow up due to lack of insurance and has not been seen by a physician in the interim. He complains of progressive fatigue, NAVARRO, PND, and edema over the past year, prompting presentation to [facility]. He also complained of lower extremity bullae, weeping, and pain. He denied palpitations, presyncope, syncope, or chest pain. Past 24 hours:  Removed Impella in the OR by Dr. Nghia Curran in right groin pulled - hemostasis achieved with manual compression  Currently sitting up    Objective:     Visit Vitals    /51    Pulse 83    Temp 97.9 °F (36.6 °C)    Resp 19    Ht 5' 9\" (1.753 m)    Wt 163 lb 5.8 oz (74.1 kg)    SpO2 100%    BMI 24.12 kg/m2      Hemodynamics:  CVP 8-9 mmHg    General:  fatigued    HEENT: Normocephalic, EOMI, PERRLA, Hearing intact, trachea mid-line    Neck:  supple, no significant adenopathy, carotids upstroke normal bilaterally, no bruits    CVP:  5 cm      Heart:  Diminished PMI    Normal S1 and S2, S3 gallop    Murmur: 1/6 systolic murmur    Lungs: Diminished breath sounds left lower lung    Abdomen: soft, non-tender. Bowel sounds normal. +HSM    Extremity: Trace edema bilaterally    Neuro: Alert and oriented to person, place, and time; normal strength and tone. Normal symmetric reflexes.  Normal coordination and gait      701 -  1900  In: 1740.2 [I.V.:1740.2]  Out: 4725 [Urine:4705]  O2 Flow Rate (L/min): 6 l/min O2 Device: Nasal cannula  Temp (24hrs), Av.9 °F (36.6 °C), Min:97.4 °F (36.3 °C), Max:98.3 °F (36.8 °C)          Care Plan discussed with:    Comments   Patient x    Family      RN x    Care Manager                    Consultant:          Past History:     Past Medical History   Diagnosis Date    Cardiomyopathy ([address] 1/20/2017     A. Echo (1/19/17):  EF 5-10% with severe GHK,. Mildly dil LA. Mild TR. PASP 46. History reviewed. No pertinent past surgical history. Social History   Substance Use Topics    Smoking status: Not on file    Smokeless tobacco: Not on file    Alcohol use Not on file        History reviewed. No pertinent family history. Allergies: Allergies   Allergen Reactions    Heparin (Porcine) Unknown (comments)          Data Review:     CXR:   CXR Results  (Last 48 hours)               02/14/17 0416  XR CHEST PORT Final result    Impression:  IMPRESSION:   1. Minimal decreased left pleural effusion with some improved aeration in the   left lower lobe. Narrative:  EXAM:  XR CHEST PORT       INDICATION:  impella, CHF,       COMPARISON:  2/13/2017       FINDINGS: A portable AP radiograph of the chest was obtained at 0357 hours. The   patient is on a cardiac monitor. The PICC line overlies the SVC. The cardiac   assist device, Impella, is unchanged. The left pleural effusion and left lower   lobe atelectasis persists but there has been some improvement in both.           02/13/17 0422  XR CHEST PORT Final result    Impression:  IMPRESSION:   1. No change left pleural effusion or left lower lobe atelectasis. Narrative:  EXAM:  XR CHEST PORT       INDICATION:  impella, CHF,       COMPARISON:  February 12, 2017       FINDINGS: A portable AP radiograph of the chest was obtained at 0353 hours. The   patient is on a cardiac monitor. The Impella cardiac assist device is noted. The   PICC line overlies the SVC. The heart size is at the upper limits of normal.       The left pleural effusion and left lower lobe atelectasis are unchanged. Right   lung is clear. No pulmonary edema. Echocardiogram:   Echo Results  (Last 48 hours)               17 0000  2D ECHO COMPLETE ADULT (TTE) W OR WO CONTR Final result    Narrative:  Darryl Thakur [address]   [address], [address]   [phone]       Transthoracic Echocardiogram       Patient: Travis Braga   MRN: [id number]   ACCT #: [de-identified]   : 1950   Age: 61 years   Gender: Male   Height: 70 in   Weight: 179.5 lb   BSA: 2 m squared   BP: 94 / 53 mmHg   Study date: 2017   Status: Routine   Location: [facility]   [facility] ACC #: [id number]       Allergies: HEPARIN (PORCINE)       Reading Group:  *[facility]   Technologist:  TIFFANY Harris   Reading Physician:  Deedee Smith. STEVE Garcia:   Left ventricle: The ventricle was moderately to severely dilated. Systolic   function was severely reduced. Ejection fraction was estimated to be 10 %. There was severe diffuse hypokinesis. There was moderate spontaneous echo   contrast, indicative of stasis. Ventricular septum: There was moderate paradoxical motion. Left atrium: The atrium was dilated. Mitral valve: There was moderate regurgitation. Tricuspid valve: There was mild to moderate regurgitation. Pericardium: There was a moderate-sized left pleural effusion. INDICATIONS: Assess left ventricular function. HISTORY: Prior history: Eval EF       PROCEDURE: This was a routine study. The study included complete 2D   imaging, M-mode, complete spectral Doppler, and color Doppler. The heart   rate was 83 bpm, at the start of the study. Systolic blood pressure was 94   mmHg, at the start of the study. Diastolic blood pressure was 53 mmHg, at   the start of the study. Image quality was fair. LEFT VENTRICLE: The ventricle was moderately to severely dilated. Systolic   function was severely reduced. Ejection fraction was estimated to be 10 %. There was severe diffuse hypokinesis.  There was moderate spontaneous echo contrast, indicative of stasis. VENTRICULAR SEPTUM: There was moderate paradoxical motion. RIGHT VENTRICLE: The size was normal. Systolic function was normal. Wall   thickness was normal.       LEFT ATRIUM: The atrium was dilated. RIGHT ATRIUM: Size was normal.       MITRAL VALVE: Normal valve structure. There was normal leaflet separation. DOPPLER: The transmitral velocity was within the normal range. There was   no evidence for stenosis. There was moderate regurgitation. AORTIC VALVE: Not well visualized due to Impella caatheter. TRICUSPID VALVE: Normal valve structure. There was normal leaflet   separation. DOPPLER: The transtricuspid velocity was within the normal   range. There was no evidence for tricuspid stenosis. There was mild to   moderate regurgitation. PULMONIC VALVE: Leaflets exhibited normal thickness, no calcification, and   normal cuspal separation. DOPPLER: The transpulmonic velocity was within   the normal range. There was no regurgitation. AORTA: The root exhibited normal size. PERICARDIUM: There was a moderate-sized left pleural effusion.        SYSTEM MEASUREMENT TABLES       2D   %FS: 4.11 %   EDV(Teich): 214.26 ml   EF(Teich): 9.11 %   ESV(Teich): 194.75 ml   IVSd: 1.11 cm   LVEDV MOD A4C: 142.48 ml   LVEF MOD A4C: 23.62 %   LVESV MOD A4C: 108.83 ml   LVIDd: 6.48 cm   LVIDs: 6.21 cm   LVLd A4C: 9.49 cm   LVLs A4C: 8.08 cm   LVPWd: 0.86 cm   SV MOD A4C: 33.66 ml   SV(Teich): 19.51 ml       CW   AV Vmax: 0.93 m/s   AV maxPG: 3.48 mmHg   TR Vmax: 1.62 m/s   TR maxPG: 10.95 mmHg       MM   Ao Diam: 2.41 cm   LA Diam: 3.81 cm   LA/Ao: 1.58       PW   LVOT Vmax: 0.9 m/s   LVOT maxPG: 3.22 mmHg   E' Lat: 0.1 m/s   E' Sept: 0.04 m/s   E/E' Lat: 8.86   E/E' Sept: 22.31   MV A Glenn: 1.05 m/s   MV Dec [place]: 3.07 m/s2   MV DecT: 274.81 ms   MV E Glenn: 0.84 m/s   MV E/A Ratio: 0.8   MV PHT: 79.7 ms   MVA By PHT: 2.76 cm2   PV Vmax: 0.86 m/s   PV maxPG: 2.96 mmHg       Prepared and E-signed by       Young Rios. Nicholas Youngblood M.D. Signed 13-Feb-2017 11:37:08                 No results found for this visit on 01/20/17. ECG:  EKG: ST with occasional PVC, NSIVCD, LAE    LABS:  Recent Results (from the past 24 hour(s))   GLUCOSE, POC    Collection Time: 02/13/17  9:22 PM   Result Value Ref Range    Glucose (POC) 173 (H) 65 - 100 mg/dL    Performed by Albert Esquivel     METABOLIC PANEL, COMPREHENSIVE    Collection Time: 02/14/17  3:03 AM   Result Value Ref Range    Sodium 135 (L) 136 - 145 mmol/L    Potassium 3.6 3.5 - 5.1 mmol/L    Chloride 98 97 - 108 mmol/L    CO2 30 21 - 32 mmol/L    Anion gap 7 5 - 15 mmol/L    Glucose 107 (H) 65 - 100 mg/dL    BUN 38 (H) 6 - 20 MG/DL    Creatinine 1.61 (H) 0.70 - 1.30 MG/DL    BUN/Creatinine ratio 24 (H) 12 - 20      GFR est AA 53 (L) >60 ml/min/1.73m2    GFR est non-AA 43 (L) >60 ml/min/1.73m2    Calcium 8.6 8.5 - 10.1 MG/DL    Bilirubin, total 0.6 0.2 - 1.0 MG/DL    ALT (SGPT) 19 12 - 78 U/L    AST (SGOT) 21 15 - 37 U/L    Alk.  phosphatase 103 45 - 117 U/L    Protein, total 6.7 6.4 - 8.2 g/dL    Albumin 2.9 (L) 3.5 - 5.0 g/dL    Globulin 3.8 2.0 - 4.0 g/dL    A-G Ratio 0.8 (L) 1.1 - 2.2     MAGNESIUM    Collection Time: 02/14/17  3:03 AM   Result Value Ref Range    Magnesium 2.4 1.6 - 2.4 mg/dL   CBC W/O DIFF    Collection Time: 02/14/17  3:03 AM   Result Value Ref Range    WBC 8.4 4.1 - 11.1 K/uL    RBC 3.26 (L) 4.10 - 5.70 M/uL    HGB 7.9 (L) 12.1 - 17.0 g/dL    HCT 25.5 (L) 36.6 - 50.3 %    MCV 78.2 (L) 80.0 - 99.0 FL    MCH 24.2 (L) 26.0 - 34.0 PG    MCHC 31.0 30.0 - 36.5 g/dL    RDW 17.7 (H) 11.5 - 14.5 %    PLATELET 314 (L) 957 - 400 K/uL   LD    Collection Time: 02/14/17  3:03 AM   Result Value Ref Range     (H) 85 - 241 U/L   TYPE & SCREEN    Collection Time: 02/14/17  3:03 AM   Result Value Ref Range    Crossmatch Expiration 02/17/2017     ABO/Rh(D) A POSITIVE     Antibody screen NEG     Unit number [id number] Blood component type RC LR AS1     Unit division 00     Status of unit REL FROM [facility]     Crossmatch result Compatible     Unit number [id number]     Blood component type RC LR AS3     Unit division 00     Status of unit REL FROM [facility]     Crossmatch result Compatible    PTT    Collection Time: 02/14/17  3:03 AM   Result Value Ref Range    aPTT 80.2 (H) 22.1 - 32.5 sec    aPTT, therapeutic range     58.0 - 77.0 SECS   GLUCOSE, POC    Collection Time: 02/14/17 11:42 AM   Result Value Ref Range    Glucose (POC) 112 (H) 65 - 100 mg/dL    Performed by Harriet Carvalho    GLUCOSE, POC    Collection Time: 02/14/17  4:43 PM   Result Value Ref Range    Glucose (POC) 211 (H) 65 - 100 mg/dL    Performed by [employer], URINE    Collection Time: 02/14/17  5:24 PM   Result Value Ref Range    AMPHETAMINE NEGATIVE  NEG      BARBITURATES NEGATIVE  NEG      BENZODIAZEPINE NEGATIVE  NEG      COCAINE NEGATIVE  NEG      METHADONE NEGATIVE  NEG      OPIATES POSITIVE (A) NEG      PCP(PHENCYCLIDINE) NEGATIVE  NEG      THC (TH-CANNABINOL) NEGATIVE  NEG      Drug screen comment (NOTE)    HGB & HCT    Collection Time: 02/14/17  5:40 PM   Result Value Ref Range    HGB 7.7 (L) 12.1 - 17.0 g/dL    HCT 24.8 (L) 36.6 - 50.3 %     All Micro Results     Procedure Component Value Units Date/Time    CULTURE, BLOOD, PAIRED [[id number]] Collected:  02/05/17 0322    Order Status:  Completed Specimen:  Blood Updated:  02/10/17 0542     Special Requests: NO SPECIAL REQUESTS        Culture result: NO GROWTH 5 DAYS       CULTURE, BODY FLUID [place]is [place] STAIN [[id number]] Collected:  01/26/17 1505    Order Status:  Completed Specimen:  Thoracentesis Updated:  01/30/17 1057     Special Requests: NO SPECIAL REQUESTS        GRAM STAIN OCCASIONAL  WBCS SEEN         NO ORGANISMS SEEN        Culture result: NO GROWTH 4 DAYS       CULTURE, Madison Poe STAIN [[id number]] Collected:  01/25/17 2134    Order Status:  Completed Specimen:  Leg Updated:  01/27/17 1440     Special Requests: NO SPECIAL REQUESTS        GRAM STAIN RARE  WBCS SEEN         NO ORGANISMS SEEN        Culture result: LIGHT  STREPTOCOCCI, BETA HEMOLYTIC GROUP C  . .. Penicillin and ampicillin are drugs of choice for treatment of beta-hemolytic streptococcal infections. Susceptibility testing of penicillins and beta-lactams approved by the FDA for treatment of beta-hemolytic streptococcal infections need not be performed routinely, because nonsusceptible isolates are extremely rare. CLSI 2012         MODERATE  BETTIE TROPICALIS           Abdominal Ultrasound 2/4/17  IMPRESSION: Distended gallbladder with gallbladder wall thickening,  pericholecystic fluid and tenderness to probe palpation during exam. Findings  are consistent with acalculus cholecystitis. Thoracentesis 1/26/17  Specimen Source   1: Pleural Fluid   CYTOLOGIC INTERPRETATION:   Scattered mesothelial cells with degenerative changes and mixed inflammatory cells   General Categorization   No cells diagnostic for malignancy   Specimen Adequacy   Satisfactory for evaluation       Cardiac MRI 1/31/17  IMPRESSION  1. Markedly dilated left ventricle with 3-D end-diastolic volume index to body  surface area of 153 mL/sq m. Mild eccentric left ventricular hypertrophy with  3-D mass index to body surface area of 85 g/sq m. Severe left ventricular  systolic dysfunction. Severe global hypokinesis with regional variation. 3-D  LVEF 10%. 2. Moderately dilated right ventricle by 3-D volumetric assessment. RV end  diastolic volume indexed to body surface area of 138 mL/sq m. Moderate to severe  right ventricular systolic dysfunction. Global hypokinesis. 3-D RVEF 25%. 3. Apical a tethered mitral valve leaflets. Mild mitral regurgitation. 4. Moderately severe 3+ tricuspid regurgitation. 5. On LGE study, the anterior wall, anteroseptal wall, anteroapical wall, and  anterior lateral wall are largely viable without significant myocardial  infarction. The entire inferior wall and inferoseptal wall are completely viable  without any infarct. The base to mid inferolateral wall demonstrate a near  transmural greater than 75% thickness infarct with minimal or no viable  myocardium in this territory. The distal inferolateral wall, apical lateral wall  does not demonstrate any infarct and are largely viable. Based on the viability  imaging, the entire LAD territory is largely viable and should recover upon  revascularization. The entire RCA territory is largely viable and should recover  upon revascularization. The LCx territory demonstrate medium-size infarct with  limited viability. 6. Large left-sided pleural effusion with passive atelectasis of the left lung. 7. Dilated branch pulmonary arteries suggest pulmonary hypertension. 8. Markedly dilated left atrium measuring 59 x 55 mm. Moderately dilated right  atrium measuring 46 x 56 mm.     Medications reviewed:    Current Facility-Administered Medications   Medication Dose Route Frequency    0.9% sodium chloride infusion 250 mL  250 mL IntraVENous PRN    morphine injection 4 mg  4 mg IntraVENous Q4H PRN    morphine 4 mg/mL injection        traMADol (ULTRAM) tablet  mg   mg Oral Q4H PRN    bumetanide (BUMEX) tablet 2 mg  2 mg Oral BID    milrinone (PRIMACOR) 20 MG/100 ML D5W infusion  0.375 mcg/kg/min IntraVENous CONTINUOUS    nicotine (NICODERM CQ) 14 mg/24 hr patch 1 Patch  1 Patch TransDERmal DAILY    spironolactone (ALDACTONE) tablet 25 mg  25 mg Oral DAILY    bisacodyl (DULCOLAX) suppository 10 mg  10 mg Rectal DAILY PRN    magnesium hydroxide (MILK OF MAGNESIA) 400 mg/5 mL oral suspension 30 mL  30 mL Oral DAILY PRN    sodium chloride (NS) flush 5-10 mL  5-10 mL IntraVENous Q8H    sodium chloride (NS) flush 5-10 mL  5-10 mL IntraVENous PRN    hydrocortisone Sod Succ (PF) (SOLU-CORTEF) injection 100 mg  100 mg IntraVENous ONCE PRN    nitroglycerin (NITROSTAT) tablet 0.4 mg  0.4 mg SubLINGual Q5MIN PRN    clopidogrel (PLAVIX) tablet 75 mg  75 mg Oral DAILY    dextrose 5% infusion  14 mL/hr IntraVENous CONTINUOUS    docusate sodium (COLACE) capsule 100 mg  100 mg Oral DAILY PRN    glimepiride (AMARYL) tablet 4 mg  4 mg Oral ACB    insulin glargine (LANTUS) injection 10 Units  10 Units SubCUTAneous DAILY    gabapentin (NEURONTIN) capsule 100 mg  100 mg Oral BID    carvedilol (COREG) tablet 3.125 mg  3.125 mg Oral BID WITH MEALS    amiodarone (CORDARONE) tablet 200 mg  200 mg Oral Q12H    bacitracin 500 unit/gram packet 1 Packet  1 Packet Topical PRN    atorvastatin (LIPITOR) tablet 10 mg  10 mg Oral DAILY    0.9% sodium chloride infusion  10 mL/hr IntraVENous CONTINUOUS    acetaminophen (TYLENOL) tablet 650 mg  650 mg Oral Q6H PRN    aspirin delayed-release tablet 81 mg  81 mg Oral DAILY    glucagon (GLUCAGEN) injection 1 mg  1 mg IntraMUSCular PRN    glucose chewable tablet 16 g  4 Tab Oral PRN    insulin lispro (HUMALOG) injection   SubCUTAneous AC&HS    magnesium oxide (MAG-OX) tablet 400 mg  400 mg Oral DAILY    0.9% sodium chloride infusion  3 mL/hr IntraVENous CONTINUOUS    sodium chloride (NS) flush 5-10 mL  5-10 mL IntraVENous Q8H    sodium chloride (NS) flush 5-10 mL  5-10 mL IntraVENous PRN    albuterol (PROVENTIL VENTOLIN) nebulizer solution 2.5 mg  2.5 mg Nebulization Q4H PRN    diphenhydrAMINE (BENADRYL) capsule 25 mg  25 mg Oral QHS PRN    oxyCODONE-acetaminophen (PERCOCET) 5-325 mg per tablet 2 Tab  2 Tab Oral Q4H PRN    ELECTROLYTE REPLACEMENT PROTOCOL  1 Each Other PRN     HIDA Scan 2/7/17  Gallbladder emptying study was performed per protocol utilizing 5. 2mCi Tc-99 M  Choletec and 1.86 mcg CCK intravenously. There is normal tracer distribution  throughout the liver. Activity is noted in the gallbladder, common bile duct,  and small bowel. The gallbladder ejection fraction is 31% (normal greater than  or equal to 35%).      IMPRESSION: Abnormal gallbladder emptying study with an EF of only 31%.        IMPRESSION / PLAN:    1. CAD - severe native vessel disease, s/p PCI for  of RCA and left main with Impella support   Stable on ASA, clopidogrel, bivalrudin, BB, and statin    2. Acute on chronic systolic heart failure (ICM with LVEF 5-10%) - Stage D, NYHA Class IV   Currently inotropic dependent, repeat echo with improved LVEF 20-24%   On IV milrinone 0.375, bumex 2 mg bid, and spironolactone 25 mg daily    3. Paroxysmal atrial fibrillation   Resume warfarin     4. DM2 with Hga1c 13.5   On lantus, SSI, and glimepiride   Diabetic education   Accuchecks    5. Cellutitis   Resolved after treatment with cefazolin    6. CKD3   Serum creatinine improving on inotropic support and Impella   Continue diuresis    7. COPD with FEV1 51% predicted    8. Acalculous CHolecystitis   Completed course of IV zosyn     9. Hyponatremia   Improved with diuresis    10. Nicotine Addiction   Smoking cessation counseling   Nicotine patch - reduce dose to 14 mg/24 hour    11. CECILIA    Hgb 7.7, plt 113    Dorie Galvan MD, [facility], [place] [address] Director    [address] [place] [address], [address] [facility], [address] [place] [address]  Office [phone]  Fax [phone]  24 hour VAD/HF Pager: [phone]

## 2017-02-15 NOTE — PROGRESS NOTES
Music Therapy Assessment    Jessika [facility] [id number]  [id number]    1950  61 y.o.  male    Patient Telephone Number: [phone] (home)   Amish Affiliation: [place]   Language: English   Extended Emergency Contact Information  Primary Emergency Contact: [address]  Mobile Phone: [phone]  Relation: None   Patient Active Problem List    Diagnosis Date Noted    Ischemic cardiomyopathy 46/38/9506    Systolic heart failure ([facility] 62/85/3043    Acute systolic (congestive) heart failure ([address] 01/19/2017    Bilateral lower extremity edema 01/19/2017    Shortness of breath 01/19/2017    Acute renal failure (ARF) ([facility] 01/19/2017    Hyperglycemia due to type 2 diabetes mellitus ([facility] [address] 01/19/2017    Venous stasis dermatitis of both lower extremities 01/19/2017    Hypoxia 01/19/2017    Pulmonary edema 01/19/2017    Sinus tachycardia 01/19/2017        Date: 2/15/2017       Mental Status:   [x] Alert [  ] Forgetful [  ]  Confused     Communication Status: [  ] Impaired Speech [  ] Nonverbal     Physical Status:   [  ] Hard of Hearing [  ] Oxygen in use [  ] Ambulatory   [  ] Ambulatory with assistance  [  ] Non-ambulatory -N/A    Music Preferences, Background: Rock from the 1960s and 76s, including [employer], The Beatles, The Rolling Stones, [employer]. Clinical Problem addressed: Positive social interaction, promote self-expression, improve/enhance mood. Goal(s) met in session:  Physical/Pain management (Scale of 1-10):    Pre-session rating: Pt denied pain. Post-session rating: Pt denied pain.     [  ] Increased relaxation   [  ] Regulated breathing patterns  [  ] Minimized physical distress   [  ] Decreased nausea discomfort     Emotional/Psychological:  [  ] Expression of feelings   [  ] Decreased aggressive behavior   [  ] Decreased sadness   [  ] Increased range of coping skills     [  ] Improved mood    [  ] Decreased withdrawn behavior     Social:  [  ] Decreased feelings of isolation  [x] Positive social interaction   [  ] Improved strained family relationships    Spiritual:  [  ] Spiritual support    [  ] Expressed peace     Techniques Utilized (Check all that apply):   [  ] Procedural support MT [  ] Music for relaxation [  ] Patient preferred music  [  ] Cleo analysis  [  ] Lord Myles choice  [  ] Music for validation  [  ] Music listening  [  ] Progressive relaxation [  ] Guided imagery  [  ] Bobby Friday  [  ] Evins Effort   [  ] Lord Myles writing  [  ] Sophia Catching along   [  ] Manuela Han  [  ] Sensory stimulation  [x] Active Listening  [  ] Music for spiritual support [  ] Making of CDs as gifts    Session Observations:  Referral from [address]. The patient (pt) was observed to be sitting comfortably in a chair and he denied pain. This music therapist (MT) observed that a black and white movie was playing on the pt's television and asked if he enjoyed old movies. The pt increased self-expression in response to this, as evidenced by (AEB) saying they're his passion and sharing about some of his favorites. The MT provided active listening, and then asked about the pt's music preferences. He shared these and about concerts he's enjoyed attending. The MT offered a music therapy session, but the pt politely declined this. He expressed gratitude for the MT's visit. Will follow as able.     Cole Chen MT-BC (Music Therapist-Board Certified)  Spiritual Care Department  Referral-based service

## 2017-02-15 NOTE — PROGRESS NOTES
Bedside report received from  Sharlene Billy, [address] [place] [address] (offgoing nurse). Assumed care of patient. No immediate concerns, patient resting with call bell within reach.

## 2017-02-15 NOTE — DIABETES MGMT
DTC Progress Note    Recommendations/ Comments: Chart reviewed for glucose management. Spoke with GREGORY Becerra and due to patient not having insurance, plan is to transition patient to  Humalog 75/25 so that he can go home on a generic 70/30 insulin. Lantus and Amaryl will be discontinued and tomorrow he will start 75/25 10 units with breakfast and 8 units with dinner. Chart reviewed on [facility]. .    Patient is a 61 y.o. male with newly diagnosed Type 2 Diabetes. A1c:   Lab Results   Component Value Date/Time    Hemoglobin A1c 13.5 01/19/2017 05:05 PM    Hemoglobin A1c 13.4 01/18/2017 11:11 PM       Recent Glucose Results:   Lab Results   Component Value Date/Time     (H) 02/15/2017 04:24 AM    GLUCPOC 181 (H) 02/15/2017 07:18 AM    GLUCPOC 177 (H) 02/14/2017 10:07 PM    GLUCPOC 211 (H) 02/14/2017 04:43 PM        Lab Results   Component Value Date/Time    Creatinine 1.98 02/15/2017 04:24 AM       Active Orders   Diet    DIET CARDIAC Regular; No Conc. Sweets        PO intake:   Patient Vitals for the past 72 hrs:   % Diet Eaten   02/15/17 0929 100 %   02/12/17 1700 100 %       Current hospital DM medication: Lantus 10 units, Amaryl 4 mg daily and Humalog for correction, high sensitivity    Will continue to follow as needed.     Thank you  Maral Morelos, MS, RN, CDE

## 2017-02-15 NOTE — PROGRESS NOTES
1212: TRANSFER - IN REPORT:    Verbal report received from [address] [place] [address] Rd rn(name) on [employer].  being received from cvicu(unit) for routine progression of care. Report consisted of patients Situation, Background, Assessment and   Recommendations(SBAR). Information from the following report(s) SBAR, Kardex, ED Summary, Procedure Summary, Intake/Output, MAR and Recent Results was reviewed with the receiving nurse. Opportunity for questions and clarification was provided. Assessment completed upon patients arrival to unit and care assumed. 1930:Bedside and Verbal shift change report given to Lena Meagan rn (oncoming nurse) by Kirti Thurman rn (offgoing nurse). Report included the following information SBAR, Kardex, ED Summary, Procedure Summary, Intake/Output, MAR and Recent Results.

## 2017-02-15 NOTE — PROGRESS NOTES
[facility]   [address] [facility], [address] [place] [address], [facility]  Phone: [phone]   [phone]       Nephrology Progress Note  [facility].     1950     [id number]  Date of Admission : 1/20/2017  02/15/17    CC: Follow up for CKD/ARF      Assessment and Plan   CHACORTA on CKD:  - Cr essentially stable and still within his baseline  - BP stable and diuresing well  - cont current dose of po bumex  - labs in AM    Hypokalemia:  - K stable  - replete as needed    Hyponatremia :  - combination of  CHF + SIADH from chronic alcoholism  - Na stable    Acute on Chronic Systolic CHF w/ severe CMP  - echo with EF 5-10%, severely dilated LV, severe TR  - Multivessel CAD : MRI showed viable myocardium   - on Milrinone  - s/p LAD and RCA stents 2/9       Interval History:  Seen and examined. Feeling well. Good UOP overnight. Cr up slightly. BP stable over the past 24 hours. No cp, sob, n/v/d reported. Review of Systems: Pertinent items are noted in HPI.     Current Medications:   Current Facility-Administered Medications   Medication Dose Route Frequency    0.9% sodium chloride infusion 250 mL  250 mL IntraVENous PRN    morphine injection 4 mg  4 mg IntraVENous Q4H PRN    traMADol (ULTRAM) tablet  mg   mg Oral Q4H PRN    bumetanide (BUMEX) tablet 2 mg  2 mg Oral BID    milrinone (PRIMACOR) 20 MG/100 ML D5W infusion  0.375 mcg/kg/min IntraVENous CONTINUOUS    nicotine (NICODERM CQ) 14 mg/24 hr patch 1 Patch  1 Patch TransDERmal DAILY    spironolactone (ALDACTONE) tablet 25 mg  25 mg Oral DAILY    bisacodyl (DULCOLAX) suppository 10 mg  10 mg Rectal DAILY PRN    magnesium hydroxide (MILK OF MAGNESIA) 400 mg/5 mL oral suspension 30 mL  30 mL Oral DAILY PRN    sodium chloride (NS) flush 5-10 mL  5-10 mL IntraVENous Q8H    sodium chloride (NS) flush 5-10 mL  5-10 mL IntraVENous PRN    hydrocortisone Sod Succ (PF) (SOLU-CORTEF) injection 100 mg  100 mg IntraVENous ONCE PRN  nitroglycerin (NITROSTAT) tablet 0.4 mg  0.4 mg SubLINGual Q5MIN PRN    clopidogrel (PLAVIX) tablet 75 mg  75 mg Oral DAILY    dextrose 5% infusion  14 mL/hr IntraVENous CONTINUOUS    docusate sodium (COLACE) capsule 100 mg  100 mg Oral DAILY PRN    glimepiride (AMARYL) tablet 4 mg  4 mg Oral ACB    insulin glargine (LANTUS) injection 10 Units  10 Units SubCUTAneous DAILY    gabapentin (NEURONTIN) capsule 100 mg  100 mg Oral BID    carvedilol (COREG) tablet 3.125 mg  3.125 mg Oral BID WITH MEALS    amiodarone (CORDARONE) tablet 200 mg  200 mg Oral Q12H    bacitracin 500 unit/gram packet 1 Packet  1 Packet Topical PRN    atorvastatin (LIPITOR) tablet 10 mg  10 mg Oral DAILY    0.9% sodium chloride infusion  10 mL/hr IntraVENous CONTINUOUS    acetaminophen (TYLENOL) tablet 650 mg  650 mg Oral Q6H PRN    aspirin delayed-release tablet 81 mg  81 mg Oral DAILY    glucagon (GLUCAGEN) injection 1 mg  1 mg IntraMUSCular PRN    glucose chewable tablet 16 g  4 Tab Oral PRN    insulin lispro (HUMALOG) injection   SubCUTAneous AC&HS    magnesium oxide (MAG-OX) tablet 400 mg  400 mg Oral DAILY    0.9% sodium chloride infusion  3 mL/hr IntraVENous CONTINUOUS    sodium chloride (NS) flush 5-10 mL  5-10 mL IntraVENous Q8H    sodium chloride (NS) flush 5-10 mL  5-10 mL IntraVENous PRN    albuterol (PROVENTIL VENTOLIN) nebulizer solution 2.5 mg  2.5 mg Nebulization Q4H PRN    diphenhydrAMINE (BENADRYL) capsule 25 mg  25 mg Oral QHS PRN    oxyCODONE-acetaminophen (PERCOCET) 5-325 mg per tablet 2 Tab  2 Tab Oral Q4H PRN    ELECTROLYTE REPLACEMENT PROTOCOL  1 Each Other PRN      Allergies   Allergen Reactions    Heparin (Porcine) Unknown (comments)       Objective:  Vitals:    Vitals:    02/15/17 0400 02/15/17 0500 02/15/17 0600 02/15/17 0700   BP: 98/56      Pulse: 92 92 91 92   Resp: 19 22 19 22   Temp: 98.9 °F (37.2 °C)      SpO2: 100% 99% 99% 97%   Weight: 75.1 kg (165 lb 9.1 oz)      Height: Intake and Output:  02/15 0701 - 02/15 1900  In: -   Out: 400 [Urine:400]  02/13 1901 - 02/15 0700  In: 7242 [P.O.:240; I.V.:1403]  Out: 3700 [Urine:3680]    Physical Examination:  General: In NAD  Resp:  Lungs CTA  CV:  RRR,  no murmur or rub,+ LE edema  GI:  Soft, NT, + Bowel sounds,  Neurologic:  Non focal  Skin:  RLE cellulitis - resolving   Ext                   Edema +,      []    High complexity decision making was performed  []    Patient is at high-risk of decompensation with multiple organ involvement    Lab Data Personally Reviewed: I have reviewed all the pertinent labs, microbiology data and radiology studies during assessment. Recent Labs      02/15/17   0424  02/14/17   0303  02/13/17   0354   NA  133*  135*  135*   K  4.3  3.6  3.8   CL  97  98  98   CO2  27  30  29   GLU  167*  107*  86   BUN  41*  38*  36*   CREA  1.98*  1.61*  1.84*   CA  8.5  8.6  8.5   MG  2.5*  2.4  2.3   ALB  2.8*  2.9*  3.0*   SGOT  15  21  24   ALT  17  19  19     Recent Labs      02/15/17   0424  02/14/17   1740  02/14/17 0303 02/13/17   1701  02/13/17   0354   WBC  7.7   --   8.4   --   8.9   HGB  7.4*  7.7*  7.9*  8.7*  8.7*   HCT  23.1*  24.8*  25.5*  27.8*  28.2*   PLT  97*   --   113*   --   130*     No results found for: SDES  Lab Results   Component Value Date/Time    Culture result: NO GROWTH 5 DAYS 02/05/2017 03:22 AM    Culture result: NO GROWTH 4 DAYS 01/26/2017 03:05 PM    Culture result:  01/25/2017 09:34 PM     LIGHT  STREPTOCOCCI, BETA HEMOLYTIC GROUP C  . .. Penicillin and ampicillin are drugs of choice for treatment of beta-hemolytic streptococcal infections. Susceptibility testing of penicillins and beta-lactams approved by the FDA for treatment of beta-hemolytic streptococcal infections need not be performed routinely, because nonsusceptible isolates are extremely rare.  CLSI 2012      Culture result: MODERATE  CANDIDA TROPICALIS   01/25/2017 09:34 PM    Culture result: NO SIGNIFICANT GROWTH 01/19/2017 01:35 AM     Recent Results (from the past 24 hour(s))   GLUCOSE, POC    Collection Time: 02/14/17 11:42 AM   Result Value Ref Range    Glucose (POC) 112 (H) 65 - 100 mg/dL    Performed by Xiomara Newton    GLUCOSE, POC    Collection Time: 02/14/17  4:43 PM   Result Value Ref Range    Glucose (POC) 211 (H) 65 - 100 mg/dL    Performed by [address] [place] [address], URINE    Collection Time: 02/14/17  5:24 PM   Result Value Ref Range    AMPHETAMINE NEGATIVE  NEG      BARBITURATES NEGATIVE  NEG      BENZODIAZEPINE NEGATIVE  NEG      COCAINE NEGATIVE  NEG      METHADONE NEGATIVE  NEG      OPIATES POSITIVE (A) NEG      PCP(PHENCYCLIDINE) NEGATIVE  NEG      THC (TH-CANNABINOL) NEGATIVE  NEG      Drug screen comment (NOTE)    HGB & HCT    Collection Time: 02/14/17  5:40 PM   Result Value Ref Range    HGB 7.7 (L) 12.1 - 17.0 g/dL    HCT 24.8 (L) 36.6 - 50.3 %   GLUCOSE, POC    Collection Time: 02/14/17 10:07 PM   Result Value Ref Range    Glucose (POC) 177 (H) 65 - 100 mg/dL    Performed by Merna Phoenix    METABOLIC PANEL, COMPREHENSIVE    Collection Time: 02/15/17  4:24 AM   Result Value Ref Range    Sodium 133 (L) 136 - 145 mmol/L    Potassium 4.3 3.5 - 5.1 mmol/L    Chloride 97 97 - 108 mmol/L    CO2 27 21 - 32 mmol/L    Anion gap 9 5 - 15 mmol/L    Glucose 167 (H) 65 - 100 mg/dL    BUN 41 (H) 6 - 20 MG/DL    Creatinine 1.98 (H) 0.70 - 1.30 MG/DL    BUN/Creatinine ratio 21 (H) 12 - 20      GFR est AA 41 (L) >60 ml/min/1.73m2    GFR est non-AA 34 (L) >60 ml/min/1.73m2    Calcium 8.5 8.5 - 10.1 MG/DL    Bilirubin, total 0.4 0.2 - 1.0 MG/DL    ALT (SGPT) 17 12 - 78 U/L    AST (SGOT) 15 15 - 37 U/L    Alk.  phosphatase 98 45 - 117 U/L    Protein, total 6.7 6.4 - 8.2 g/dL    Albumin 2.8 (L) 3.5 - 5.0 g/dL    Globulin 3.9 2.0 - 4.0 g/dL    A-G Ratio 0.7 (L) 1.1 - 2.2     MAGNESIUM    Collection Time: 02/15/17  4:24 AM   Result Value Ref Range    Magnesium 2.5 (H) 1.6 - 2.4 mg/dL   CBC W/O DIFF    Collection Time: 02/15/17  4:24 AM   Result Value Ref Range    WBC 7.7 4.1 - 11.1 K/uL    RBC 2.97 (L) 4.10 - 5.70 M/uL    HGB 7.4 (L) 12.1 - 17.0 g/dL    HCT 23.1 (L) 36.6 - 50.3 %    MCV 77.8 (L) 80.0 - 99.0 FL    MCH 24.9 (L) 26.0 - 34.0 PG    MCHC 32.0 30.0 - 36.5 g/dL    RDW 17.9 (H) 11.5 - 14.5 %    PLATELET 97 (L) 285 - 400 K/uL   PTT    Collection Time: 02/15/17  4:24 AM   Result Value Ref Range    aPTT 28.2 22.1 - 32.5 sec    aPTT, therapeutic range     58.0 - 77.0 SECS   GLUCOSE, POC    Collection Time: 02/15/17  7:18 AM   Result Value Ref Range    Glucose (POC) 181 (H) 65 - 100 mg/dL    Performed by Rosana Alcantara            I have reviewed the flowsheets. Chart and Pertinent Notes have been reviewed. No change in PMH ,family and social history from Consult note.       Syed Head MD

## 2017-02-15 NOTE — PROGRESS NOTES
TRANSFER - OUT REPORT:    Verbal report given to [facility], RN(name) on [employer].  being transferred to CVSU(unit) for routine progression of care       Report consisted of patients Situation, Background, Assessment and   Recommendations(SBAR). Information from the following report(s) SBAR, Kardex, Intake/Output, MAR, Recent Results and Cardiac Rhythm NSR was reviewed with the receiving nurse. Lines:   PICC Triple Lumen 01/25/17 Left;Brachial (Active)   Central Line Being Utilized Yes 2/15/2017  8:27 AM   Criteria for Appropriate Use Hemodynamically unstable, requiring monitoring lines, vasopressors, or volume resuscitation 2/15/2017  8:27 AM   Site Assessment Clean, dry, & intact 2/15/2017  8:27 AM   Phlebitis Assessment 0 2/15/2017  8:27 AM   Infiltration Assessment 0 2/15/2017  8:27 AM   Date of Last Dressing Change 02/14/17 2/15/2017  8:27 AM   Dressing Status Clean, dry, & intact 2/15/2017  8:27 AM   External Catheter Length (cm) 0 centimeters 2/15/2017  8:27 AM   Dressing Type Disk with Chlorhexadine Gluconate (CHG); Transparent 2/15/2017  8:27 AM   Action Taken Open ports on tubing capped 2/15/2017  8:27 AM   Hub Color/Line Status White; Infusing;Patent 2/15/2017  8:27 AM   Positive Blood Return (Site #1) Yes 2/14/2017 10:00 AM   Hub Color/Line Status Gray;Capped 2/15/2017  8:27 AM   Positive Blood Return (Site #2) Yes 2/14/2017  8:00 PM   Hub Color/Line Status Red; Infusing;Patent 2/15/2017  8:27 AM   Positive Blood Return (Site #3) Yes 2/14/2017  8:00 PM   Alcohol Cap Used Yes 2/15/2017  8:27 AM        Opportunity for questions and clarification was provided.       Patient transported with:   Monitor  O2 @ 2 liters  Registered Nurse

## 2017-02-15 NOTE — PROGRESS NOTES
Bedside and Verbal shift change report given to Jari Peabody (oncoming nurse) .  Report included the following information SBAR, Kardex, Procedure Summary, Intake/Output, MAR, Recent Results and Cardiac Rhythm SR.

## 2017-02-15 NOTE — PROGRESS NOTES
Advanced Heart Failure ICU Note      Admit Date: 2017       Procedures:  SVO2 swan placed via RFV  Impella placed via LFA  PCI performed via RFA     Successful MIKE pRCA: 2.5x38 Xience + 2.75x12 Xience overlapping. Successful MIKE ost left main: 4.0x12 Xience post-dil with 4.5x12 NC. By Dr. Lelia Gutierrez    Removal percutaneous ventricular   assist device (Impella pump) at separate and distinct session from   insertion (CPT CODE 96505) on 17 by Dr. Demetria Fonseca        Subjective:     Pt sitting up in chair - feels good this morning. Denies CP or SOB.     IMPRESSION/Plan:   s/p PCI w/ Impella support -  Cont. ASA and Plavix. On low dose Coreg,  No ACEi d/t renal dysfunction. Continue statin. Acute on chronic systolic heart failure (ICM) - Stage D, NYHA Class IV - Will continue Milrinone   0.375 mcg/kg/min - no wean today d/t hypotension and worsening renal function,  will continue low dose Coreg, no ACEi d/t renal insufficiency, Daily weights - transfer to stepdown unit.     Acalculus Cholecystitis - resolved, Zosyn complete per ID     L pleural effusion - stable - monitor    Anemia - transfuse 1 U PRBC today, trend H/H    H/O PAF - no recent AF but will start Coumadin. Daily PT/INR     CECILIA - REJI positive, platelets 97 today - close f/u NO HEPARIN     Hyponatremia - stable, cont. To trend     Diabetes Mellitus - will transition to 75/25 insulin starting tomorrow 10 units every morning and 8 units every evening. Will d/c Amaryl per DTC recommendations. Appreciate input.      Cellulitis - on Ancef ID, Legs look much better today     H/o Tobacco abuse - smoking cessation, nicotine patch     CHACORTA on CKD3 - stable, renal following - continue current dose of Bumex 2 mg BID    Pulmonary HTN -           Objective:   Vitals:  Blood pressure 94/53, pulse 83, temperature 98 °F (36.7 °C), resp. rate 12, height 5' 9\" (1.753 m), weight 167 lb 15.9 oz (76.2 kg), SpO2 95 %.   Temp (24hrs), Av.2 °F (36.8 °C), Min:98 °F (36.7 °C), Max:98.3 °F (36.8 °C)    Oxygen Therapy:  Oxygen Therapy  O2 Sat (%): 95 % (02/13/17 1000)  Pulse via Oximetry: 83 beats per minute (02/13/17 1000)  O2 Device: Nasal cannula (02/13/17 0800)  O2 Flow Rate (L/min): 1 l/min (02/13/17 0800)  ETCO2 (mmHg): 1 mmHg (02/07/17 0821)    EKG: sinus tach    CXR -Lines/tubes/surgical: Left-sided PICC. Interval removal of a balloon pump. Heart/mediastinum: Calcifications in the aortic arch. Lungs/pleura: Diffuse interstitial prominence. Opacity of the left midlung and  left base with obscured left hemidiaphragm. No visible pneumothorax.         Admission Weight: Last Weight   Weight: 178 lb 2.1 oz (80.8 kg) Weight: 167 lb 15.9 oz (76.2 kg)     Intake / Output / Drain:  Current Shift: 02/13 0701 - 02/13 1900  In: 83.8 [I.V.:83.8]  Out: 600 [Urine:600]  Last 24 hrs.: 02/11 1901 - 02/13 0700  In: 4496.5 [P.O.:2180; I.V.:2316.5]  Out: 8325 [Urine:8325]      EXAM:    HEENT:  EOMI       CVS:  S1/S2       LUNGS:  Decreased L base, R clear                   ABD:  +BS, soft, NT/ND                  EXT:  1+ b/l LE edema, +erythema                  Neuro:  No obvious deficits       Labs:   Lab Results   Component Value Date/Time    Glucose (POC) 97 02/13/2017 07:13 AM    Glucose (POC) 112 02/12/2017 09:35 PM    Glucose (POC) 226 02/12/2017 05:19 PM    Glucose (POC) 202 02/12/2017 12:15 PM    Glucose (POC) 167 02/12/2017 08:38 AM     Recent Labs      02/13/17   0354  02/12/17   1954  02/12/17   1226  02/12/17   0418   02/11/17   0413   NA  135*   --    --   135*   --   135*   K  3.8   --    --   3.5   --   3.5   CL  98   --    --   97   --   97   CO2  29   --    --   28   --   30   BUN  36*   --    --   32*   --   35*   CREA  1.84*   --    --   1.62*   --   1.62*   GLU  86   --    --   143*   --   186*   CA  8.5   --    --   8.5   --   8.5   ALB  3.0*   --    --   2.9*   --   2.8*   WBC  8.9   --    --   8.7   --   7.6   HGB  8.7*  8.5*  9.0*  9.3*   < >  9.2*   HCT  28.2*  28.0* 28.9*  30.0*   < >  29.8*   PLT  130*   --    --   142*   --   145*    < > = values in this interval not displayed.        Pt seen w/ Dr. Parmjit Cisse and Dr. Lendon Opitz By: Salina Stark PA-C    Saw patient, agree with above  Risk of morbidity and mortality - high  Medical decision making - high complexity

## 2017-02-15 NOTE — PROGRESS NOTES
Problem: Heart Failure: Day 5  Goal: Off Pathway (Use only if patient is Off Pathway)  Outcome: Progressing Towards Goal  Impella removed ,hemodynamics stable. Continues on Milrinone @ 0.375mcg/kg/min

## 2017-02-16 LAB
ALBUMIN SERPL BCP-MCNC: 2.8 G/DL (ref 3.5–5)
ALBUMIN/GLOB SERPL: 0.7 {RATIO} (ref 1.1–2.2)
ALP SERPL-CCNC: 95 U/L (ref 45–117)
ALT SERPL-CCNC: 16 U/L (ref 12–78)
ANION GAP BLD CALC-SCNC: 9 MMOL/L (ref 5–15)
AST SERPL W P-5'-P-CCNC: 16 U/L (ref 15–37)
BILIRUB SERPL-MCNC: 0.5 MG/DL (ref 0.2–1)
BUN SERPL-MCNC: 44 MG/DL (ref 6–20)
BUN/CREAT SERPL: 23 (ref 12–20)
CALCIUM SERPL-MCNC: 8.8 MG/DL (ref 8.5–10.1)
CHLORIDE SERPL-SCNC: 92 MMOL/L (ref 97–108)
CO2 SERPL-SCNC: 28 MMOL/L (ref 21–32)
CREAT SERPL-MCNC: 1.94 MG/DL (ref 0.7–1.3)
ERYTHROCYTE [DISTWIDTH] IN BLOOD BY AUTOMATED COUNT: 18.2 % (ref 11.5–14.5)
GLOBULIN SER CALC-MCNC: 4 G/DL (ref 2–4)
GLUCOSE BLD STRIP.AUTO-MCNC: 155 MG/DL (ref 65–100)
GLUCOSE BLD STRIP.AUTO-MCNC: 169 MG/DL (ref 65–100)
GLUCOSE BLD STRIP.AUTO-MCNC: 174 MG/DL (ref 65–100)
GLUCOSE BLD STRIP.AUTO-MCNC: 93 MG/DL (ref 65–100)
GLUCOSE SERPL-MCNC: 209 MG/DL (ref 65–100)
HCT VFR BLD AUTO: 23.6 % (ref 36.6–50.3)
HEMOCCULT STL QL: POSITIVE
HGB BLD-MCNC: 7.6 G/DL (ref 12.1–17)
HIV 2 ANTIBODY,HIV2: NEGATIVE
INR PPP: 1.2 (ref 0.9–1.1)
MAGNESIUM SERPL-MCNC: 2.2 MG/DL (ref 1.6–2.4)
MCH RBC QN AUTO: 25.4 PG (ref 26–34)
MCHC RBC AUTO-ENTMCNC: 32.2 G/DL (ref 30–36.5)
MCV RBC AUTO: 78.9 FL (ref 80–99)
PLATELET # BLD AUTO: 123 K/UL (ref 150–400)
POTASSIUM SERPL-SCNC: 3.8 MMOL/L (ref 3.5–5.1)
PROT SERPL-MCNC: 6.8 G/DL (ref 6.4–8.2)
PROTHROMBIN TIME: 12.1 SEC (ref 9–11.1)
RBC # BLD AUTO: 2.99 M/UL (ref 4.1–5.7)
SERVICE CMNT-IMP: ABNORMAL
SERVICE CMNT-IMP: NORMAL
SODIUM SERPL-SCNC: 129 MMOL/L (ref 136–145)
WBC # BLD AUTO: 6.5 K/UL (ref 4.1–11.1)

## 2017-02-16 PROCEDURE — 74011250637 HC RX REV CODE- 250/637: Performed by: PHYSICIAN ASSISTANT

## 2017-02-16 PROCEDURE — 80053 COMPREHEN METABOLIC PANEL: CPT | Performed by: PHYSICIAN ASSISTANT

## 2017-02-16 PROCEDURE — 85027 COMPLETE CBC AUTOMATED: CPT | Performed by: PHYSICIAN ASSISTANT

## 2017-02-16 PROCEDURE — 74011250637 HC RX REV CODE- 250/637: Performed by: NURSE PRACTITIONER

## 2017-02-16 PROCEDURE — 74011250637 HC RX REV CODE- 250/637: Performed by: INTERNAL MEDICINE

## 2017-02-16 PROCEDURE — 74011636637 HC RX REV CODE- 636/637: Performed by: PHYSICIAN ASSISTANT

## 2017-02-16 PROCEDURE — 74011250637 HC RX REV CODE- 250/637: Performed by: THORACIC SURGERY (CARDIOTHORACIC VASCULAR SURGERY)

## 2017-02-16 PROCEDURE — 85610 PROTHROMBIN TIME: CPT | Performed by: PHYSICIAN ASSISTANT

## 2017-02-16 PROCEDURE — 74011000250 HC RX REV CODE- 250: Performed by: NURSE PRACTITIONER

## 2017-02-16 PROCEDURE — 82272 OCCULT BLD FECES 1-3 TESTS: CPT | Performed by: PHYSICIAN ASSISTANT

## 2017-02-16 PROCEDURE — 36415 COLL VENOUS BLD VENIPUNCTURE: CPT | Performed by: PHYSICIAN ASSISTANT

## 2017-02-16 PROCEDURE — 82962 GLUCOSE BLOOD TEST: CPT

## 2017-02-16 PROCEDURE — 74011250637 HC RX REV CODE- 250/637: Performed by: SPECIALIST

## 2017-02-16 PROCEDURE — 74011250636 HC RX REV CODE- 250/636: Performed by: INTERNAL MEDICINE

## 2017-02-16 PROCEDURE — 65660000000 HC RM CCU STEPDOWN

## 2017-02-16 PROCEDURE — 83735 ASSAY OF MAGNESIUM: CPT | Performed by: PHYSICIAN ASSISTANT

## 2017-02-16 RX ORDER — SODIUM CHLORIDE 0.9 % (FLUSH) 0.9 %
5-10 SYRINGE (ML) INJECTION EVERY 8 HOURS
Status: DISCONTINUED | OUTPATIENT
Start: 2017-02-16 | End: 2017-02-22 | Stop reason: HOSPADM

## 2017-02-16 RX ORDER — BUMETANIDE 1 MG/1
2 TABLET ORAL 3 TIMES DAILY
Status: DISCONTINUED | OUTPATIENT
Start: 2017-02-16 | End: 2017-02-19

## 2017-02-16 RX ADMIN — GABAPENTIN 100 MG: 100 CAPSULE ORAL at 18:18

## 2017-02-16 RX ADMIN — MILRINONE LACTATE 0.38 MCG/KG/MIN: 200 INJECTION, SOLUTION INTRAVENOUS at 03:00

## 2017-02-16 RX ADMIN — OXYCODONE HYDROCHLORIDE AND ACETAMINOPHEN 2 TABLET: 5; 325 TABLET ORAL at 22:28

## 2017-02-16 RX ADMIN — Medication 20 ML: at 15:26

## 2017-02-16 RX ADMIN — Medication 10 ML: at 22:28

## 2017-02-16 RX ADMIN — CARVEDILOL 3.12 MG: 3.12 TABLET, FILM COATED ORAL at 17:16

## 2017-02-16 RX ADMIN — BUMETANIDE 2 MG: 1 TABLET ORAL at 17:16

## 2017-02-16 RX ADMIN — Medication 10 ML: at 05:30

## 2017-02-16 RX ADMIN — Medication 400 MG: at 09:07

## 2017-02-16 RX ADMIN — ATORVASTATIN CALCIUM 10 MG: 10 TABLET, FILM COATED ORAL at 09:10

## 2017-02-16 RX ADMIN — Medication 81 MG: at 09:06

## 2017-02-16 RX ADMIN — OXYCODONE HYDROCHLORIDE AND ACETAMINOPHEN 2 TABLET: 5; 325 TABLET ORAL at 05:40

## 2017-02-16 RX ADMIN — OXYCODONE HYDROCHLORIDE AND ACETAMINOPHEN 2 TABLET: 5; 325 TABLET ORAL at 09:48

## 2017-02-16 RX ADMIN — POLYETHYLENE GLYCOL 3350, SODIUM SULFATE ANHYDROUS, SODIUM BICARBONATE, SODIUM CHLORIDE, POTASSIUM CHLORIDE 4000 ML: 236; 22.74; 6.74; 5.86; 2.97 POWDER, FOR SOLUTION ORAL at 15:06

## 2017-02-16 RX ADMIN — GABAPENTIN 100 MG: 100 CAPSULE ORAL at 09:11

## 2017-02-16 RX ADMIN — SPIRONOLACTONE 25 MG: 25 TABLET, FILM COATED ORAL at 09:10

## 2017-02-16 RX ADMIN — OXYCODONE HYDROCHLORIDE AND ACETAMINOPHEN 2 TABLET: 5; 325 TABLET ORAL at 14:24

## 2017-02-16 RX ADMIN — CARVEDILOL 3.12 MG: 3.12 TABLET, FILM COATED ORAL at 09:07

## 2017-02-16 RX ADMIN — Medication 10 ML: at 15:24

## 2017-02-16 RX ADMIN — AMIODARONE HYDROCHLORIDE 200 MG: 200 TABLET ORAL at 20:51

## 2017-02-16 RX ADMIN — BUMETANIDE 2 MG: 1 TABLET ORAL at 22:27

## 2017-02-16 RX ADMIN — CLOPIDOGREL BISULFATE 75 MG: 75 TABLET, FILM COATED ORAL at 09:09

## 2017-02-16 RX ADMIN — BUMETANIDE 2 MG: 1 TABLET ORAL at 09:06

## 2017-02-16 RX ADMIN — MILRINONE LACTATE 0.38 MCG/KG/MIN: 200 INJECTION, SOLUTION INTRAVENOUS at 15:27

## 2017-02-16 RX ADMIN — AMIODARONE HYDROCHLORIDE 200 MG: 200 TABLET ORAL at 09:09

## 2017-02-16 RX ADMIN — OXYCODONE HYDROCHLORIDE AND ACETAMINOPHEN 2 TABLET: 5; 325 TABLET ORAL at 18:19

## 2017-02-16 RX ADMIN — INSULIN LISPRO 10 UNITS: 100 INJECTION, SUSPENSION SUBCUTANEOUS at 07:30

## 2017-02-16 NOTE — PROGRESS NOTES
Infectious Diseases Progress Note    Antibiotic Summary:  Levaquin  --   Vancomycin  --   Ancef    -- 2/3  Keflex   2/3 --   Zosyn   --       Subjective:     No complaints. He has had a good day. Objective:     Vitals:   Visit Vitals    /59 (BP 1 Location: Right arm, BP Patient Position: At rest;Head of bed elevated (Comment degrees))    Pulse 82    Temp 96.5 °F (35.8 °C)  Comment: eating ice    Resp 18    Ht 5' 9\" (1.753 m)    Wt 75.1 kg (165 lb 9.1 oz)    SpO2 97%    BMI 24.45 kg/m2        Tmax:  Temp (24hrs), Av.7 °F (36.5 °C), Min:96.5 °F (35.8 °C), Max:98.9 °F (37.2 °C)      Exam:  General appearance: alert, no distress  Lungs: clear  Heart: RRR  Abdomen: nontender  Left leg: cellulitis resolved  Right leg: cellulitis resolved    IV Lines: Left PICC inserted     Labs:    Recent Labs      02/15/17   1935  02/15/17   0424  17   1740  17   0303  17   1701  17   0354   WBC   --   7.7   --   8.4   --   8.9   HGB  7.7*  7.4*  7.7*  7.9*  8.7*  8.7*   PLT   --   97*   --   113*   --   130*   BUN   --   41*   --   38*   --   36*   CREA   --   1.98*   --   1.61*   --   1.84*   TBILI   --   0.4   --   0.6   --   0.7   SGOT   --   15   --   21   --   24   AP   --   98   --   103   --   113     Culture right leg ulcers  = group C Streptococcus    US abdomen 2/3 = \"Distended gallbladder with gallbladder wall thickening,  pericholecystic fluid and tenderness to probe palpation during exam. Findings  are consistent with acalculus cholecystitis\"    HIDA on  = visualization with filling of the GB, CBD, and small bowel -- GB EF 31%    Assessment:     1. Bilateral venous stasis disease of the LEs +/- cellulitis: Resolved     2. RUQ tenderness and abnormal GB US: Clinically, he says the symptoms have been for months or even years. Remains on Zosyn. HIDA shows visualization of the GB with mildly decreased EF of 31%.  The pain has resolved for the first time in \"5 years\"     3. OHD -- IHD/CAD; CHF; pulm HTN     4. CRF with acute exacerbation     5. NIDDM -- new diagnosis     6. Probable COPD -- heavy smoking since age 7      9. Anemia -- HC/MC -- positive family history of colon cancer -- may need GI W/U    Plan:     1.  Watch off antibiotics      Daphne Patten MD

## 2017-02-16 NOTE — PROGRESS NOTES
Advanced Heart Failure Progress Note      Admit Date: 1/20/2017       Procedures:  SVO2 swan placed via RFV  Impella placed via LFA  PCI performed via RFA     Successful MIKE pRCA: 2.5x38 Xience + 2.75x12 Xience overlapping. Successful MIKE ost left main: 4.0x12 Xience post-dil with 4.5x12 NC. By Dr. Theron Escamilla    Removal percutaneous ventricular   assist device (Impella pump) at separate and distinct session from   insertion (CPT CODE 89632) on 2/14/17 by Dr. Griffin Jacobs        Subjective:     Seen with Dr. Griffin Jacobs. Last 24 hours:   Hgb stable p 1 unit PRBCs  Cr up slightly 1.65  Increasing activity    This AM: Sitting in bed, eating lunch. Frustrated that he has been hospitalized for one month.       IMPRESSION/Plan:   s/p PCI w/ Impella support -  Cont. ASA and Plavix. On low dose Coreg,  No ACEi d/t renal dysfunction. Continue statin. Acute on chronic systolic heart failure (ICM) - Stage D, NYHA Class IV - continue Milrinone   0.375 mcg/kg/min - no wean now until we determine cause of anemia and Cr stabilizes. Sodium stable, wieght up 1 lb - continue current dose diuretic. Consider increasing in AM if weight continues to trend up. Will continue to dose Coreg, no ACEi d/t renal insufficiency, Daily weights. Strict I/Os     Anemia - Hgb 8.5 p 1 unit PRBCs. Full liquid diet tomorrow in anticipation of M2A on Monday. Acalculous Cholecystitis - resolved, Zosyn complete per ID.     L pleural effusion - Improved post thoracentesis. Monitor. H/O PAF - no recent AF - no anticoagulation for now d/t anemia - will start Coumadin when appropriate and when M2A capsule results are back.       CECILIA - REJI positive, platelets 566 today - close f/u NO HEPARIN     Hyponatremia - decreased today, will increase diuretic, cont.  To monitor     Diabetes Mellitus - continue current treatments - Appreciate  DTC input.      Cellulitis - on Ancef ID, improved     H/o Tobacco abuse - smoking cessation, nicotine patch     CHACORTA on CKD3 - stable, renal following - increase current dose of to Bumex 2 mg TID            Objective:   Vitals:  Patient Vitals for the past 24 hrs:   Temp Pulse Resp BP SpO2   02/18/17 1624 97.5 °F (36.4 °C) 84 18 94/61 100 %   02/18/17 1355 98 °F (36.7 °C) 89 18 112/67 97 %   02/18/17 0702 97.5 °F (36.4 °C) 86 18 118/69 100 %   02/18/17 0349 98.7 °F (37.1 °C) 92 16 108/71 97 %   02/17/17 2340 98.6 °F (37 °C) 91 16 108/65 95 %   02/17/17 2240 98.4 °F (36.9 °C) 93 16 109/64 95 %   02/17/17 2204 98.5 °F (36.9 °C) 93 16 102/65 96 %   02/17/17 2115 98.6 °F (37 °C) 95 16 106/59 95 %   02/17/17 2025 98.3 °F (36.8 °C) 91 16 100/65 96 %   02/17/17 2010 98.7 °F (37.1 °C) 89 16 98/57 98 %   02/17/17 1936 98.1 °F (36.7 °C) 90 16 98/58 99 %     EKG: sinus tach    Last 3 Recorded Weights in this Encounter    02/16/17 0532 02/17/17 0411 02/18/17 0345   Weight: 169 lb 1.5 oz (76.7 kg) 170 lb 10.2 oz (77.4 kg) 171 lb 8.3 oz (77.8 kg)       Intake / Output / Drain:    Intake/Output Summary (Last 24 hours) at 02/18/17 1729  Last data filed at 02/18/17 1623   Gross per 24 hour   Intake          1190.62 ml   Output             2130 ml   Net          -939.38 ml       EXAM:    HEENT:  EOMI       CVS:  S1/S2       LUNGS: soft bibasilar crackles                  ABD:  +BS, soft, NT/ND                  EXT:  Trace - 1+ b/l LE edema. TEDs in place.                     Neuro:  No obvious deficits       Labs:   Lab Results   Component Value Date/Time    Sodium 129 02/16/2017 05:13 AM    Potassium 3.8 02/16/2017 05:13 AM    Chloride 92 02/16/2017 05:13 AM    CO2 28 02/16/2017 05:13 AM    Anion gap 9 02/16/2017 05:13 AM    Glucose 209 02/16/2017 05:13 AM    BUN 44 02/16/2017 05:13 AM    Creatinine 1.94 02/16/2017 05:13 AM    BUN/Creatinine ratio 23 02/16/2017 05:13 AM    GFR est AA 42 02/16/2017 05:13 AM    GFR est non-AA 35 02/16/2017 05:13 AM    Calcium 8.8 02/16/2017 05:13 AM     Lab Results   Component Value Date/Time    WBC 6.5 02/16/2017 05:13 AM    HGB 7.6 02/16/2017 05:13 AM    HCT 23.6 02/16/2017 05:13 AM    PLATELET 861 36/32/9966 05:13 AM    MCV 78.9 02/16/2017 05:13 AM         Abd/pelvis CT - LUNG BASES: There is a small left pleural effusion with left lower lobe  atelectasis. INCIDENTALLY IMAGED HEART AND MEDIASTINUM: Unremarkable. LIVER: No mass or biliary dilatation. GALLBLADDER: Unremarkable. SPLEEN: There is a 17 mm splenic cyst.  PANCREAS: No mass or ductal dilatation. ADRENALS: Unremarkable. KIDNEYS/URETERS: Punctate nonobstructing left renal stone. No mass lesion or  hydronephrosis. STOMACH: Unremarkable. SMALL BOWEL: No dilatation or wall thickening. COLON: Moderate fecal stasis is noted. APPENDIX: Unremarkable. PERITONEUM: No ascites or pneumoperitoneum. RETROPERITONEUM: No evidence of retroperitoneal hematoma. REPRODUCTIVE ORGANS: No pelvic mass or lymphadenopathy. URINARY BLADDER: No mass or calculus. BONES: Degenerative changes are seen in the thoracolumbar spine. ADDITIONAL COMMENTS: Postprocedural changes in the left inguinal region without  evidence of significant hematoma.     IMPRESSION  IMPRESSION:  No evidence of inguinal or retroperitoneal hematoma. Small left pleural effusion  with left lower lobe atelectasis.       Signed By: [facility]Paul Burleson [facility]                      [facility]      Saw patient, agree with above  Risk of morbidity and mortality - high  Medical decision making - high complexity

## 2017-02-16 NOTE — CONSULTS
[address] [address]        GASTROENTEROLOGY CONSULTATION NOTE  Nannette Montiel, [facility]  [phone] office  [phone] NP in-hospital cell phone M-F until 4:30  After 5pm or on weekends, please call  for physician on call        NAME:  Fernanda Quintero. :   1950   MRN:   [id number]       Referring Physician: GREGORY Taylor    Consult Date: 2017 10:10 AM    Chief Complaint: anemia     History of Present Illness:  Patient is a 61 y.o. who is seen in consultation at the request of GREGORY Taylor for GI bleed. Pt PMH as below. Pt known to us from previous admission. Pt is anemic and was transfused a unit without expected increase in hemoglobin. Checked his stool and was hemoccult positive. Pt is on Plavix. He had a nosebleed this admission and pt believes that is why his stool is heme positive. Pt states he takes Advil \"by the handful\", denies: abdominal pain, black stool, reflux, heartburn, nausea, vomiting, dysphagia, alcohol, smoking. No previous EGD, colonoscopy \"years ago. \"    I have reviewed the emergency room note, hospital admission note, notes by all other physicians who have seen the patient during this hospitalization to date. I have reviewed the problem list and the reason for this hospitalization. I have reviewed the allergies and the medications the patient was taking at home prior to this hospitalization. PMH:  Past Medical History   Diagnosis Date    Cardiomyopathy ([address] 2017     A. Echo (17):  EF 5-10% with severe GHK,. Mildly dil LA. Mild TR. PASP 46. PSH:  History reviewed. No pertinent past surgical history. Allergies:   Allergies   Allergen Reactions    Heparin (Porcine) Unknown (comments)       Home Medications:  None       Hospital Medications:  Current Facility-Administered Medications   Medication Dose Route Frequency    sodium chloride (NS) flush 5-10 mL  5-10 mL InterCATHeter Q8H    insulin lispro protamine/insulin lispro (HUMALOG MIX 75/25) injection 10 Units  10 Units SubCUTAneous ACB    insulin lispro protamine/insulin lispro (HUMALOG MIX 75/25) injection 8 Units  8 Units SubCUTAneous ACD    glucose chewable tablet 16 g  4 Tab Oral PRN    dextrose (D50W) injection syrg 12.5-25 g  12.5-25 g IntraVENous PRN    glucagon (GLUCAGEN) injection 1 mg  1 mg IntraMUSCular PRN    insulin lispro (HUMALOG) injection   SubCUTAneous AC&HS    oxyCODONE-acetaminophen (PERCOCET) 5-325 mg per tablet 2 Tab  2 Tab Oral Q4H PRN    traMADol (ULTRAM) tablet  mg   mg Oral Q4H PRN    bumetanide (BUMEX) tablet 2 mg  2 mg Oral BID    milrinone (PRIMACOR) 20 MG/100 ML D5W infusion  0.375 mcg/kg/min IntraVENous CONTINUOUS    nicotine (NICODERM CQ) 14 mg/24 hr patch 1 Patch  1 Patch TransDERmal DAILY    spironolactone (ALDACTONE) tablet 25 mg  25 mg Oral DAILY    bisacodyl (DULCOLAX) suppository 10 mg  10 mg Rectal DAILY PRN    magnesium hydroxide (MILK OF MAGNESIA) 400 mg/5 mL oral suspension 30 mL  30 mL Oral DAILY PRN    clopidogrel (PLAVIX) tablet 75 mg  75 mg Oral DAILY    docusate sodium (COLACE) capsule 100 mg  100 mg Oral DAILY PRN    gabapentin (NEURONTIN) capsule 100 mg  100 mg Oral BID    carvedilol (COREG) tablet 3.125 mg  3.125 mg Oral BID WITH MEALS    amiodarone (CORDARONE) tablet 200 mg  200 mg Oral Q12H    atorvastatin (LIPITOR) tablet 10 mg  10 mg Oral DAILY    acetaminophen (TYLENOL) tablet 650 mg  650 mg Oral Q6H PRN    aspirin delayed-release tablet 81 mg  81 mg Oral DAILY    magnesium oxide (MAG-OX) tablet 400 mg  400 mg Oral DAILY    albuterol (PROVENTIL VENTOLIN) nebulizer solution 2.5 mg  2.5 mg Nebulization Q4H PRN    diphenhydrAMINE (BENADRYL) capsule 25 mg  25 mg Oral QHS PRN    ELECTROLYTE REPLACEMENT PROTOCOL  1 Each Other PRN       Social History:  Social History   Substance Use Topics    Smoking status: Not on file    Smokeless tobacco: Not on file    Alcohol use Not on file       Family History:  History reviewed. No pertinent family history.     Review of Systems:  Constitutional: negative fever, negative chills, negative weight loss  Eyes:   negative visual changes  ENT:   negative sore throat, tongue or lip swelling  Respiratory:  negative cough, + dyspnea  Cards:  negative for chest pain, palpitations, + lower extremity edema  GI:   See HPI  :  negative for frequency, dysuria  Integument:  negative for rash and pruritus  Heme:  negative for easy bruising and gum/nose bleeding  Musculoskel: negative for myalgias, back pain and muscle weakness  Neuro: negative for headaches, dizziness, vertigo  Psych:  negative for feelings of anxiety, depression     Objective:   Patient Vitals for the past 8 hrs:   BP Temp Pulse Resp SpO2 Weight   02/16/17 0906 102/65 - 86 - - -   02/16/17 0810 94/62 98.3 °F (36.8 °C) 87 18 96 % -   02/16/17 0807 151/79 98.7 °F (37.1 °C) 97 16 95 % -   02/16/17 0532 - - - - - 76.7 kg (169 lb 1.5 oz)   02/16/17 0459 107/70 98.6 °F (37 °C) 92 16 95 % -     02/16 0701 - 02/16 1900  In: 440 [P.O.:440]  Out: -   02/14 1901 - 02/16 0700  In: 1859.7 [P.O.:1560; I.V.:299.7]  Out: 4050 [Urine:4050]    EXAM:     CONST:  Chronically-ill male in bed in no acute distress   NEURO:  alert and oriented x 3   HEENT: EOMI, no scleral icterus   LUNGS: clear to ausculation, (-) wheeze   CARD:  regular rate and rhythm, S1 S2   ABD:  soft, no tenderness, no rebound, bowel sounds (+) all 4 quadrants, no masses, non distended   EXT:  no edema, warm   PSYCH: full, not anxious     Data Review     Recent Labs      02/16/17   0513  02/15/17   1935  02/15/17   0424   WBC  6.5   --   7.7   HGB  7.6*  7.7*  7.4*   HCT  23.6*  24.6*  23.1*   PLT  123*   --   97*     Recent Labs      02/16/17   0513  02/15/17   0424   NA  129*  133*   K  3.8  4.3   CL  92*  97   CO2  28  27   BUN  44*  41*   CREA  1.94*  1.98*   GLU  209*  167*   CA  8.8  8.5 Recent Labs      02/16/17   0513  02/15/17   0424   SGOT  16  15   AP  95  98   TP  6.8  6.7   ALB  2.8*  2.8*   GLOB  4.0  3.9     Recent Labs      02/16/17   0513  02/15/17   1935  02/15/17   0424  02/14/17   0303   INR  1.2*  1.2*   --    --    PTP  12.1*  12.0*   --    --    APTT   --    --   28.2  80.2*          Assessment:   · Possible GI bleed: anemic with hemoccult positive stool. Patient Active Problem List   Diagnosis Code    Acute systolic (congestive) heart failure ([facility]) I50.21    Bilateral lower extremity edema R60.0    Shortness of breath R06.02    Acute renal failure (ARF) ([facility]) N17.9    Hyperglycemia due to type 2 diabetes mellitus ([facility]) E11.65    Venous stasis dermatitis of both lower extremities I83.11, I83.12    Hypoxia R09.02    Pulmonary edema J81.1    Sinus tachycardia R00.0    Ischemic cardiomyopathy O10.1    Systolic heart failure ([facility]) I50.20     Plan:   · EGD and colonoscopy tomorrow with Dr. Cassandra Salcedo  · Likely unable to hold Plavix due to fresh stent  · NPO at midnight  · Clears today  · BID PPI  · Trend H/H  · Transfuse as necessary  · If pt begins to bleed briskly, order CT scan GI bleed protocol and immediately consult IR for intervention if the bleeding scan is positive  · Thank you for allowing me to participate in care of Kacey Caro. Signed By: Zara Fung. Unique Way NP     2/16/2017  10:10 AM         GI Attending: Agree with above. Patient reports last colonoscopy 12 years ago at which time a large polyp was removed that was located \"high up in the colon. \" He endorses significant OTC NSAID use since his last hospitalization. He is having melenic stool this afternoon. Plan for EGD and colonoscopy tomorrow. Evangelist Salcedo MD

## 2017-02-16 NOTE — ROUTINE PROCESS
0730- Verbal shift change report given to Carmelita Nelson RN & Ankit Smith, RN (oncoming nurse) by Magy Whitehead RN (offgoing nurse). Report included the following information SBAR, Kardex, Procedure Summary, Intake/Output, MAR, Accordion and Recent Results. 0230- Provided patient with medication education. Handouts were provided, reviewed, and placed in patient's chart. Patient expressed concern about having to take so many medications as he is not used to having to do so. We discussed the use of a pill box to help him keep organized. 9288- Patient met with Jarod Virgen NP for GI consult. No new orders. 1115-  Patient had a BM in the hat. Difficulty with managing pain in groin. Nephrology came by. MD stated interest in blood transfusion. 1430- Go-Lytely started. 1500- Consents signed for colonoscopy and endoscopy tomorrow. 1700- Wound care completed. Dressing changed. 1945- Bedside and Verbal shift change report given to Magy Whitehead RN (oncoming nurse) by Carmelita Nelson RN & Ankit Smith RN (offgoing nurse). Report included the following information SBAR, Kardex, Intake/Output, MAR, Accordion and Recent Results.

## 2017-02-16 NOTE — PROGRESS NOTES
[facility]   [address] [address], [address], [facility]  Phone: [phone]   [phone]       Nephrology Progress Note  Alvaro Keenan.     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 Cr stable. C/o fatigue.  hgb unchanged after a unit of blood yesterday. GI eval appreciated. No cp or sob, fevers or chills reported. Review of Systems: Pertinent items are noted in HPI.     Current Medications:   Current Facility-Administered Medications   Medication Dose Route Frequency    sodium chloride (NS) flush 5-10 mL  5-10 mL InterCATHeter Q8H    peg 3350-electrolytes (COLYTE) 4000 mL  4,000 mL Oral ONCE    insulin lispro protamine/insulin lispro (HUMALOG MIX 75/25) injection 10 Units  10 Units SubCUTAneous ACB    insulin lispro protamine/insulin lispro (HUMALOG MIX 75/25) injection 8 Units  8 Units SubCUTAneous ACD    glucose chewable tablet 16 g  4 Tab Oral PRN    dextrose (D50W) injection syrg 12.5-25 g  12.5-25 g IntraVENous PRN    glucagon (GLUCAGEN) injection 1 mg  1 mg IntraMUSCular PRN    insulin lispro (HUMALOG) injection   SubCUTAneous AC&HS    oxyCODONE-acetaminophen (PERCOCET) 5-325 mg per tablet 2 Tab  2 Tab Oral Q4H PRN    traMADol (ULTRAM) tablet  mg   mg Oral Q4H PRN    bumetanide (BUMEX) tablet 2 mg  2 mg Oral BID    milrinone (PRIMACOR) 20 MG/100 ML D5W infusion  0.375 mcg/kg/min IntraVENous CONTINUOUS    nicotine (NICODERM CQ) 14 mg/24 hr patch 1 Patch  1 Patch TransDERmal DAILY    spironolactone (ALDACTONE) tablet 25 mg  25 mg Oral DAILY    bisacodyl (DULCOLAX) suppository 10 mg  10 mg Rectal DAILY PRN    magnesium hydroxide (MILK OF MAGNESIA) 400 mg/5 mL oral suspension 30 mL  30 mL Oral DAILY PRN    clopidogrel (PLAVIX) tablet 75 mg  75 mg Oral DAILY    docusate sodium (COLACE) capsule 100 mg  100 mg Oral DAILY PRN    gabapentin (NEURONTIN) capsule 100 mg  100 mg Oral BID    carvedilol (COREG) tablet 3.125 mg  3.125 mg Oral BID WITH MEALS    amiodarone (CORDARONE) tablet 200 mg  200 mg Oral Q12H    atorvastatin (LIPITOR) tablet 10 mg  10 mg Oral DAILY    acetaminophen (TYLENOL) tablet 650 mg  650 mg Oral Q6H PRN    aspirin delayed-release tablet 81 mg  81 mg Oral DAILY    magnesium oxide (MAG-OX) tablet 400 mg  400 mg Oral DAILY    albuterol (PROVENTIL VENTOLIN) nebulizer solution 2.5 mg  2.5 mg Nebulization Q4H PRN    diphenhydrAMINE (BENADRYL) capsule 25 mg  25 mg Oral QHS PRN    ELECTROLYTE REPLACEMENT PROTOCOL  1 Each Other PRN      Allergies   Allergen Reactions    Heparin (Porcine) Unknown (comments)       Objective:  Vitals:    Vitals:    02/16/17 0807 02/16/17 0810 02/16/17 0906 02/16/17 1110   BP: 151/79 94/62 102/65 92/57   Pulse: 97 87 86 83   Resp: 16 18 16   Temp: 98.7 °F (37.1 °C) 98.3 °F (36.8 °C)  97.7 °F (36.5 °C)   SpO2: 95% 96%  97%   Weight:       Height:         Intake and Output:  02/16 0701 - 02/16 1900  In: 440 [P.O.:440]  Out: 525 [Urine:525]  02/14 1901 - 02/16 0700  In: 1859.7 [P.O.:1560; I.V.:299.7]  Out: 4050 [Urine:4050]    Physical Examination:  General: In NAD  Resp:  Lungs CTA  CV:  RRR,  no murmur or rub,+ LE edema  GI:  Soft, NT, + Bowel sounds,  Neurologic:  Non focal  Skin:  RLE cellulitis - resolving   Ext                   Edema +,      []    High complexity decision making was performed  []    Patient is at high-risk of decompensation with multiple organ involvement    Lab Data Personally Reviewed: I have reviewed all the pertinent labs, microbiology data and radiology studies during assessment. Recent Labs      02/16/17   0513  02/15/17   1935  02/15/17   0424  02/14/17   0303   NA  129*   --   133*  135*   K  3.8   --   4.3  3.6   CL  92*   --   97  98   CO2  28   --   27  30   GLU  209*   --   167*  107*   BUN  44*   --   41*  38*   CREA  1.94*   --   1.98*  1.61*   CA  8.8   --   8.5  8.6   MG  2.2   --   2.5*  2.4   ALB  2.8*   --   2.8*  2.9*   SGOT  16   --   15  21   ALT  16   --   17  19   INR  1.2*  1.2*   --    --      Recent Labs      02/16/17   0513  02/15/17   1935  02/15/17   0424  02/14/17   1740  02/14/17   0303   WBC  6.5   --   7.7   --   8.4   HGB  7.6*  7.7*  7.4*  7.7*  7.9*   HCT  23.6*  24.6*  23.1*  24.8*  25.5*   PLT  123*   --   97*   --   113*     No results found for: SDES  Lab Results   Component Value Date/Time    Culture result: NO GROWTH 5 DAYS 02/05/2017 03:22 AM    Culture result: NO GROWTH 4 DAYS 01/26/2017 03:05 PM    Culture result:  01/25/2017 09:34 PM     LIGHT  STREPTOCOCCI, BETA HEMOLYTIC GROUP C  . .. Penicillin and ampicillin are drugs of choice for treatment of beta-hemolytic streptococcal infections. Susceptibility testing of penicillins and beta-lactams approved by the FDA for treatment of beta-hemolytic streptococcal infections need not be performed routinely, because nonsusceptible isolates are extremely rare.  CLSI 2012      Culture result: MODERATE  CANDIDA TROPICALIS   01/25/2017 09:34 PM    Culture result: NO SIGNIFICANT GROWTH 01/19/2017 01:35 AM     Recent Results (from the past 24 hour(s))   GLUCOSE, POC    Collection Time: 02/15/17 12:00 PM   Result Value Ref Range    Glucose (POC) 219 (H) 65 - 100 mg/dL Performed by Michael Gipson    GLUCOSE, POC    Collection Time: 02/15/17  4:48 PM   Result Value Ref Range    Glucose (POC) 194 (H) 65 - 100 mg/dL    Performed by Brian Higuera + INR    Collection Time: 02/15/17  7:35 PM   Result Value Ref Range    INR 1.2 (H) 0.9 - 1.1      Prothrombin time 12.0 (H) 9.0 - 11.1 sec   HGB & HCT    Collection Time: 02/15/17  7:35 PM   Result Value Ref Range    HGB 7.7 (L) 12.1 - 17.0 g/dL    HCT 24.6 (L) 36.6 - 50.3 %   GLUCOSE, POC    Collection Time: 02/15/17  9:35 PM   Result Value Ref Range    Glucose (POC) 165 (H) 65 - 100 mg/dL    Performed by Teresa García    METABOLIC PANEL, COMPREHENSIVE    Collection Time: 02/16/17  5:13 AM   Result Value Ref Range    Sodium 129 (L) 136 - 145 mmol/L    Potassium 3.8 3.5 - 5.1 mmol/L    Chloride 92 (L) 97 - 108 mmol/L    CO2 28 21 - 32 mmol/L    Anion gap 9 5 - 15 mmol/L    Glucose 209 (H) 65 - 100 mg/dL    BUN 44 (H) 6 - 20 MG/DL    Creatinine 1.94 (H) 0.70 - 1.30 MG/DL    BUN/Creatinine ratio 23 (H) 12 - 20      GFR est AA 42 (L) >60 ml/min/1.73m2    GFR est non-AA 35 (L) >60 ml/min/1.73m2    Calcium 8.8 8.5 - 10.1 MG/DL    Bilirubin, total 0.5 0.2 - 1.0 MG/DL    ALT (SGPT) 16 12 - 78 U/L    AST (SGOT) 16 15 - 37 U/L    Alk.  phosphatase 95 45 - 117 U/L    Protein, total 6.8 6.4 - 8.2 g/dL    Albumin 2.8 (L) 3.5 - 5.0 g/dL    Globulin 4.0 2.0 - 4.0 g/dL    A-G Ratio 0.7 (L) 1.1 - 2.2     MAGNESIUM    Collection Time: 02/16/17  5:13 AM   Result Value Ref Range    Magnesium 2.2 1.6 - 2.4 mg/dL   CBC W/O DIFF    Collection Time: 02/16/17  5:13 AM   Result Value Ref Range    WBC 6.5 4.1 - 11.1 K/uL    RBC 2.99 (L) 4.10 - 5.70 M/uL    HGB 7.6 (L) 12.1 - 17.0 g/dL    HCT 23.6 (L) 36.6 - 50.3 %    MCV 78.9 (L) 80.0 - 99.0 FL    MCH 25.4 (L) 26.0 - 34.0 PG    MCHC 32.2 30.0 - 36.5 g/dL    RDW 18.2 (H) 11.5 - 14.5 %    PLATELET 980 (L) 820 - 400 K/uL   PROTHROMBIN TIME + INR    Collection Time: 02/16/17  5:13 AM   Result Value Ref Range    INR 1.2 (H) 0.9 - 1.1      Prothrombin time 12.1 (H) 9.0 - 11.1 sec   GLUCOSE, POC    Collection Time: 02/16/17  6:40 AM   Result Value Ref Range    Glucose (POC) 155 (H) 65 - 100 mg/dL    Performed by Shane Hudson I have reviewed the flowsheets. Chart and Pertinent Notes have been reviewed. No change in PMH ,family and social history from Consult note.       Leonid Villasenor MD

## 2017-02-16 NOTE — PROGRESS NOTES
NUTRITION- DIETETIC tECHnICIAN    Pt seen for:       [x]                  Rescreen  []                  Food preferences/tolerances  []                  Food Allergies  []                  PO intake check  []                  Supplements  []                  Diet order clarification  []                  Education  []                  Other     Rescreen:    [x]                  Not at Nutrition Risk, rescreen per screening protocol  []                  At Nutrition Risk- RD referral         SUBJECTIVE/OBJECTIVE:     Information obtained from:  patient      Diet:  Clear Liquid (Was Regular Cardiac NCS)    Intake: excellent    Patient Vitals for the past 100 hrs:   % Diet Eaten   02/16/17 0942 100 %   02/15/17 1820 100 %   02/15/17 1325 100 %   02/15/17 0929 100 %   02/12/17 1700 100 %       Weight Changes:       Wt Readings from Last 3 Encounters:   02/16/17 76.7 kg (169 lb 1.5 oz)   01/20/17 81 kg (178 lb 9.2 oz)     Problems Identified      [x]                  No problems identified    []                  Specified food preferences   []                  Dislikes supplements              []                  Allergies:   []                  Difficulty chewing      []                  Dentition    []                  Nausea/Vomiting   []                  Constipation   []                  Diarrhea    PLAN:     [x]                   Continue current diet and encourage intake  []                   Obtained/adjusted food preferences/tolerances and/or snacks options   []                   Dislikes supplements will try a substitution  []                   Modify diet for food allergies  []                   Adjust texture due to difficulty chewing   []                   Educated patient  []                   RD Referral  [x]                   Rescreen per screening protocol          Ciara Garcia DTR

## 2017-02-16 NOTE — DIABETES MGMT
DTC Education Note    Recommendations/ Comments: No recommendations at this time. DTC will continue to follow. Chart reviewed on Fernanda Balderas. Patient is a 61 y.o. male with newly diagnosed Type 2 Diabetes. Pt has been seen by DTC in multiple occasions during admission and extensive DM education have been provided. Today saw patient again to educate him on Humulin 70/30. Discussed/ encouraged the following:  - Insulin action, when to take it (30 minutes before breakfast and dinner)  - Importance of having regular meals and having snacks available if unable to eat at regular time  - Treatment of hypoglycemia  - Provided information on REliOn meter and possibility of buying the Humulin 70/30 from [facility] for $25     A1c:   Lab Results   Component Value Date/Time    Hemoglobin A1c 13.5 01/19/2017 05:05 PM    Hemoglobin A1c 13.4 01/18/2017 11:11 PM       Recent Glucose Results: Lab Results   Component Value Date/Time     (H) 02/16/2017 05:13 AM    GLUCPOC 93 02/16/2017 12:10 PM    GLUCPOC 155 (H) 02/16/2017 06:40 AM    GLUCPOC 165 (H) 02/15/2017 09:35 PM        Lab Results   Component Value Date/Time    Creatinine 1.94 02/16/2017 05:13 AM       Active Orders   Diet    DIET CLEAR LIQUID    DIET NPO        PO intake: Patient Vitals for the past 72 hrs:   % Diet Eaten   02/16/17 0942 100 %   02/15/17 1820 100 %   02/15/17 1325 100 %   02/15/17 0929 100 %       Current hospital DM medication: Humalog 75/25 10 units before breakfast and 8 units before dinner, Humalog for correction, high sensitivity scale. Will continue to follow as needed.     Thank you,    Karlene Linn RD

## 2017-02-16 NOTE — PROGRESS NOTES
1600: I have read and agree with the RN ORIENTEE'S ASSESSMENT AND documentation.     Juan Herring RN

## 2017-02-16 NOTE — PROGRESS NOTES
1845: Betsey Arellano for INR and HgB results. Will check hemoccult if has BM and monitor groin sites. No coumadin ordered at this time. Merlyn Campos to be in hospital in AM to f/u with H&H results. 2020: Groin sites are soft to touch, no hematoma present. LEFT groin site very tender to touch but remains clean and dry and intact with no s/sxs of infection or hematoma. 2115: Pt off unit to CT with RN.  2130: Paged Dr Lavern Walden for pt c/o pain, percocet d/c'd from tx to [facility]. Given order to renew percocet order. 0730: Bedside and Verbal shift change report given to Boston Luong (oncoming nurse) by Heike Moseley (offgoing nurse). Report included the following information SBAR, Kardex, ED Summary, Procedure Summary, Intake/Output, MAR, Recent Results and Cardiac Rhythm NSR.

## 2017-02-16 NOTE — PROGRESS NOTES
Follow up visit with patient in [facility]. He shared concern about recent medical changes and expected diagnostic procedures. Continues to express gratitude for  support and reliance on prayer and God's mercy and commitment.   Provided assurance of  prayer and reminded of continued  availability upon request.    [facility],Third Floor  [address] (1450)

## 2017-02-16 NOTE — PROGRESS NOTES
6463- Provided patient with medication education. Handouts were provided, reviewed, and placed in patient's chart. Patient expressed concern about having to take so many medications as he is not used to having to do so. We discussed the use of a pill box to help him keep organized.

## 2017-02-17 ENCOUNTER — APPOINTMENT (OUTPATIENT)
Dept: GENERAL RADIOLOGY | Age: 67
DRG: 215 | End: 2017-02-17
Attending: PHYSICIAN ASSISTANT
Payer: SELF-PAY

## 2017-02-17 LAB
ALBUMIN SERPL BCP-MCNC: 3 G/DL (ref 3.5–5)
ALBUMIN/GLOB SERPL: 0.7 {RATIO} (ref 1.1–2.2)
ALP SERPL-CCNC: 99 U/L (ref 45–117)
ALT SERPL-CCNC: 20 U/L (ref 12–78)
ANION GAP BLD CALC-SCNC: 9 MMOL/L (ref 5–15)
AST SERPL W P-5'-P-CCNC: 18 U/L (ref 15–37)
BILIRUB SERPL-MCNC: 0.6 MG/DL (ref 0.2–1)
BUN SERPL-MCNC: 37 MG/DL (ref 6–20)
BUN/CREAT SERPL: 23 (ref 12–20)
CALCIUM SERPL-MCNC: 8.6 MG/DL (ref 8.5–10.1)
CHLORIDE SERPL-SCNC: 93 MMOL/L (ref 97–108)
CO2 SERPL-SCNC: 30 MMOL/L (ref 21–32)
CREAT SERPL-MCNC: 1.58 MG/DL (ref 0.7–1.3)
ERYTHROCYTE [DISTWIDTH] IN BLOOD BY AUTOMATED COUNT: 18.6 % (ref 11.5–14.5)
GLOBULIN SER CALC-MCNC: 4.2 G/DL (ref 2–4)
GLUCOSE BLD STRIP.AUTO-MCNC: 113 MG/DL (ref 65–100)
GLUCOSE BLD STRIP.AUTO-MCNC: 248 MG/DL (ref 65–100)
GLUCOSE BLD STRIP.AUTO-MCNC: 255 MG/DL (ref 65–100)
GLUCOSE SERPL-MCNC: 98 MG/DL (ref 65–100)
HCT VFR BLD AUTO: 24.8 % (ref 36.6–50.3)
HGB BLD-MCNC: 7.9 G/DL (ref 12.1–17)
INR PPP: 1.2 (ref 0.9–1.1)
MAGNESIUM SERPL-MCNC: 2.2 MG/DL (ref 1.6–2.4)
MCH RBC QN AUTO: 25.4 PG (ref 26–34)
MCHC RBC AUTO-ENTMCNC: 31.9 G/DL (ref 30–36.5)
MCV RBC AUTO: 79.7 FL (ref 80–99)
PLATELET # BLD AUTO: 161 K/UL (ref 150–400)
POTASSIUM SERPL-SCNC: 3.5 MMOL/L (ref 3.5–5.1)
PROT SERPL-MCNC: 7.2 G/DL (ref 6.4–8.2)
PROTHROMBIN TIME: 12.4 SEC (ref 9–11.1)
RBC # BLD AUTO: 3.11 M/UL (ref 4.1–5.7)
SERVICE CMNT-IMP: ABNORMAL
SODIUM SERPL-SCNC: 132 MMOL/L (ref 136–145)
WBC # BLD AUTO: 5.8 K/UL (ref 4.1–11.1)

## 2017-02-17 PROCEDURE — 74011250637 HC RX REV CODE- 250/637: Performed by: PHYSICIAN ASSISTANT

## 2017-02-17 PROCEDURE — 77030027957 HC TBNG IRR ENDOGTR BUSS -B: Performed by: INTERNAL MEDICINE

## 2017-02-17 PROCEDURE — 76060000031 HC ANESTHESIA FIRST 0.5 HR: Performed by: INTERNAL MEDICINE

## 2017-02-17 PROCEDURE — 80053 COMPREHEN METABOLIC PANEL: CPT | Performed by: PHYSICIAN ASSISTANT

## 2017-02-17 PROCEDURE — 74011250637 HC RX REV CODE- 250/637: Performed by: INTERNAL MEDICINE

## 2017-02-17 PROCEDURE — 71020 XR CHEST PA LAT: CPT

## 2017-02-17 PROCEDURE — 76040000019: Performed by: INTERNAL MEDICINE

## 2017-02-17 PROCEDURE — 85027 COMPLETE CBC AUTOMATED: CPT | Performed by: PHYSICIAN ASSISTANT

## 2017-02-17 PROCEDURE — 74011250636 HC RX REV CODE- 250/636: Performed by: INTERNAL MEDICINE

## 2017-02-17 PROCEDURE — 83735 ASSAY OF MAGNESIUM: CPT | Performed by: PHYSICIAN ASSISTANT

## 2017-02-17 PROCEDURE — 85610 PROTHROMBIN TIME: CPT | Performed by: PHYSICIAN ASSISTANT

## 2017-02-17 PROCEDURE — 0DJ08ZZ INSPECTION OF UPPER INTESTINAL TRACT, VIA NATURAL OR ARTIFICIAL OPENING ENDOSCOPIC: ICD-10-PCS | Performed by: INTERNAL MEDICINE

## 2017-02-17 PROCEDURE — 74011250637 HC RX REV CODE- 250/637: Performed by: THORACIC SURGERY (CARDIOTHORACIC VASCULAR SURGERY)

## 2017-02-17 PROCEDURE — 36415 COLL VENOUS BLD VENIPUNCTURE: CPT | Performed by: PHYSICIAN ASSISTANT

## 2017-02-17 PROCEDURE — P9016 RBC LEUKOCYTES REDUCED: HCPCS | Performed by: THORACIC SURGERY (CARDIOTHORACIC VASCULAR SURGERY)

## 2017-02-17 PROCEDURE — 0DJD8ZZ INSPECTION OF LOWER INTESTINAL TRACT, VIA NATURAL OR ARTIFICIAL OPENING ENDOSCOPIC: ICD-10-PCS | Performed by: INTERNAL MEDICINE

## 2017-02-17 PROCEDURE — 74011250636 HC RX REV CODE- 250/636

## 2017-02-17 PROCEDURE — 82962 GLUCOSE BLOOD TEST: CPT

## 2017-02-17 PROCEDURE — 36430 TRANSFUSION BLD/BLD COMPNT: CPT

## 2017-02-17 PROCEDURE — 74011250637 HC RX REV CODE- 250/637: Performed by: NURSE PRACTITIONER

## 2017-02-17 PROCEDURE — 74011636637 HC RX REV CODE- 636/637: Performed by: PHYSICIAN ASSISTANT

## 2017-02-17 PROCEDURE — 65660000000 HC RM CCU STEPDOWN

## 2017-02-17 RX ORDER — FLUMAZENIL 0.1 MG/ML
0.2 INJECTION INTRAVENOUS
Status: DISCONTINUED | OUTPATIENT
Start: 2017-02-17 | End: 2017-02-17 | Stop reason: HOSPADM

## 2017-02-17 RX ORDER — FENTANYL CITRATE 50 UG/ML
100 INJECTION, SOLUTION INTRAMUSCULAR; INTRAVENOUS
Status: DISCONTINUED | OUTPATIENT
Start: 2017-02-17 | End: 2017-02-17 | Stop reason: HOSPADM

## 2017-02-17 RX ORDER — SODIUM CHLORIDE 0.9 % (FLUSH) 0.9 %
5-10 SYRINGE (ML) INJECTION EVERY 8 HOURS
Status: COMPLETED | OUTPATIENT
Start: 2017-02-17 | End: 2017-02-17

## 2017-02-17 RX ORDER — SODIUM CHLORIDE 9 MG/ML
100 INJECTION, SOLUTION INTRAVENOUS CONTINUOUS
Status: DISPENSED | OUTPATIENT
Start: 2017-02-17 | End: 2017-02-17

## 2017-02-17 RX ORDER — DIPHENHYDRAMINE HYDROCHLORIDE 50 MG/ML
50 INJECTION, SOLUTION INTRAMUSCULAR; INTRAVENOUS ONCE
Status: DISCONTINUED | OUTPATIENT
Start: 2017-02-17 | End: 2017-02-17 | Stop reason: HOSPADM

## 2017-02-17 RX ORDER — SODIUM CHLORIDE 9 MG/ML
250 INJECTION, SOLUTION INTRAVENOUS AS NEEDED
Status: DISCONTINUED | OUTPATIENT
Start: 2017-02-17 | End: 2017-02-18

## 2017-02-17 RX ORDER — ATROPINE SULFATE 0.1 MG/ML
0.5 INJECTION INTRAVENOUS
Status: DISCONTINUED | OUTPATIENT
Start: 2017-02-17 | End: 2017-02-17 | Stop reason: HOSPADM

## 2017-02-17 RX ORDER — SODIUM CHLORIDE 9 MG/ML
INJECTION, SOLUTION INTRAVENOUS
Status: DISCONTINUED | OUTPATIENT
Start: 2017-02-17 | End: 2017-02-17 | Stop reason: HOSPADM

## 2017-02-17 RX ORDER — MIDAZOLAM HYDROCHLORIDE 1 MG/ML
.25-1 INJECTION, SOLUTION INTRAMUSCULAR; INTRAVENOUS
Status: DISCONTINUED | OUTPATIENT
Start: 2017-02-17 | End: 2017-02-17 | Stop reason: HOSPADM

## 2017-02-17 RX ORDER — PROPOFOL 10 MG/ML
INJECTION, EMULSION INTRAVENOUS AS NEEDED
Status: DISCONTINUED | OUTPATIENT
Start: 2017-02-17 | End: 2017-02-17 | Stop reason: HOSPADM

## 2017-02-17 RX ORDER — NALOXONE HYDROCHLORIDE 0.4 MG/ML
0.4 INJECTION, SOLUTION INTRAMUSCULAR; INTRAVENOUS; SUBCUTANEOUS
Status: DISCONTINUED | OUTPATIENT
Start: 2017-02-17 | End: 2017-02-17 | Stop reason: HOSPADM

## 2017-02-17 RX ORDER — EPINEPHRINE 0.1 MG/ML
1 INJECTION INTRACARDIAC; INTRAVENOUS
Status: DISCONTINUED | OUTPATIENT
Start: 2017-02-17 | End: 2017-02-17 | Stop reason: HOSPADM

## 2017-02-17 RX ORDER — PHENYLEPHRINE HCL IN 0.9% NACL 0.4MG/10ML
SYRINGE (ML) INTRAVENOUS AS NEEDED
Status: DISCONTINUED | OUTPATIENT
Start: 2017-02-17 | End: 2017-02-17 | Stop reason: HOSPADM

## 2017-02-17 RX ORDER — DEXTROMETHORPHAN/PSEUDOEPHED 2.5-7.5/.8
1.2 DROPS ORAL
Status: DISCONTINUED | OUTPATIENT
Start: 2017-02-17 | End: 2017-02-17 | Stop reason: HOSPADM

## 2017-02-17 RX ORDER — SODIUM CHLORIDE 0.9 % (FLUSH) 0.9 %
5-10 SYRINGE (ML) INJECTION AS NEEDED
Status: ACTIVE | OUTPATIENT
Start: 2017-02-17 | End: 2017-02-17

## 2017-02-17 RX ADMIN — PROPOFOL 20 MG: 10 INJECTION, EMULSION INTRAVENOUS at 11:30

## 2017-02-17 RX ADMIN — MILRINONE LACTATE 0.38 MCG/KG/MIN: 200 INJECTION, SOLUTION INTRAVENOUS at 01:01

## 2017-02-17 RX ADMIN — SODIUM CHLORIDE: 9 INJECTION, SOLUTION INTRAVENOUS at 11:10

## 2017-02-17 RX ADMIN — PROPOFOL 20 MG: 10 INJECTION, EMULSION INTRAVENOUS at 11:34

## 2017-02-17 RX ADMIN — Medication 40 MCG: at 11:44

## 2017-02-17 RX ADMIN — Medication 40 MCG: at 11:30

## 2017-02-17 RX ADMIN — PROPOFOL 20 MG: 10 INJECTION, EMULSION INTRAVENOUS at 11:44

## 2017-02-17 RX ADMIN — Medication 10 ML: at 14:00

## 2017-02-17 RX ADMIN — Medication 10 ML: at 06:41

## 2017-02-17 RX ADMIN — OXYCODONE HYDROCHLORIDE AND ACETAMINOPHEN 2 TABLET: 5; 325 TABLET ORAL at 21:23

## 2017-02-17 RX ADMIN — OXYCODONE HYDROCHLORIDE AND ACETAMINOPHEN 2 TABLET: 5; 325 TABLET ORAL at 06:13

## 2017-02-17 RX ADMIN — SPIRONOLACTONE 25 MG: 25 TABLET, FILM COATED ORAL at 09:13

## 2017-02-17 RX ADMIN — PROPOFOL 40 MG: 10 INJECTION, EMULSION INTRAVENOUS at 11:29

## 2017-02-17 RX ADMIN — PROPOFOL 20 MG: 10 INJECTION, EMULSION INTRAVENOUS at 11:31

## 2017-02-17 RX ADMIN — OXYCODONE HYDROCHLORIDE AND ACETAMINOPHEN 2 TABLET: 5; 325 TABLET ORAL at 16:37

## 2017-02-17 RX ADMIN — AMIODARONE HYDROCHLORIDE 200 MG: 200 TABLET ORAL at 21:23

## 2017-02-17 RX ADMIN — MILRINONE LACTATE 0.38 MCG/KG/MIN: 200 INJECTION, SOLUTION INTRAVENOUS at 12:12

## 2017-02-17 RX ADMIN — BUMETANIDE 2 MG: 1 TABLET ORAL at 16:37

## 2017-02-17 RX ADMIN — CARVEDILOL 3.12 MG: 3.12 TABLET, FILM COATED ORAL at 09:13

## 2017-02-17 RX ADMIN — GABAPENTIN 100 MG: 100 CAPSULE ORAL at 22:10

## 2017-02-17 RX ADMIN — Medication 80 MCG: at 11:43

## 2017-02-17 RX ADMIN — BUMETANIDE 2 MG: 1 TABLET ORAL at 21:23

## 2017-02-17 RX ADMIN — Medication 80 MCG: at 11:37

## 2017-02-17 RX ADMIN — INSULIN LISPRO 1 UNITS: 100 INJECTION, SOLUTION INTRAVENOUS; SUBCUTANEOUS at 22:16

## 2017-02-17 RX ADMIN — MILRINONE LACTATE 0.38 MCG/KG/MIN: 200 INJECTION, SOLUTION INTRAVENOUS at 23:58

## 2017-02-17 RX ADMIN — AMIODARONE HYDROCHLORIDE 200 MG: 200 TABLET ORAL at 09:13

## 2017-02-17 RX ADMIN — CARVEDILOL 3.12 MG: 3.12 TABLET, FILM COATED ORAL at 16:37

## 2017-02-17 NOTE — PROCEDURES
[address] [facility] 12, [address] [facility]        Colonoscopy Operative Report    Opal Ambrocio  [id number]  1950      Procedure Type:   Colonoscopy --diagnostic     Indications:  Anemia, melena, history of colon polyps        Pre-operative Diagnosis: see indication above    Post-operative Diagnosis:  See findings below    :  Mason Lennon. Margoth Drummond MD      Referring Provider: Leon Alcantara MD    Sedation:  MAC anesthesia Propofol      Procedure Details:  After informed consent was obtained with all risks and benefits of procedure explained and preoperative exam completed, the patient was taken to the endoscopy suite and placed in the left lateral decubitus position. Upon sequential sedation as per above, a digital rectal exam was performed demonstrating internal hemorrhoids. The Olympus adult videocolonoscope  was inserted in the rectum and carefully advanced to the cecum, which was identified by the ileocecal valve and appendiceal orifice. The cecum was identified by the ileocecal valve and appendiceal orifice. The quality of preparation was adequate. The colonoscope was slowly withdrawn with careful evaluation between folds. Retroflexion in the rectum was completed . Findings:   Rectum: small internal hemorrhoids  Sigmoid: mild diverticulosis  Descending Colon: normal  Transverse Colon: normal  Ascending Colon: single, sessile 3-4 mm polyp in distal ascending colon - not removed in setting of Plavix  Cecum: non-bleeding AVM 2-3 mm in size - not ablated in setting of Plavix  Terminal Ileum: not intubated      Specimen Removed:  none    Complications: None. EBL:  None. Impression:    1. Ascending colon polyp - not removed  2. 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 Internal hemorhoids  4.  Mild left-sided diverticulosis    Recommendations:  Repeat colonoscopy when he can have Plavix held for 5 days  If melena/anemia persist over the weekend, we can proceed with capsule endoscopy on Monday. Signed By: Debo Medina.  Rodrigue Macedo MD     2/17/2017  12:49 PM

## 2017-02-17 NOTE — PROGRESS NOTES
0800 Bedside shift change report given to Margie Short RN  (oncoming nurse) by Sherlyn Jones RN (offgoing nurse). Report included the following information SBAR, ED Summary, OR Summary, Procedure Summary, Intake/Output, MAR, Recent Results, Med Rec Status and Cardiac Rhythm NSR. Problem: Diabetes Self-Management  Goal: *Disease process and treatment process  Define diabetes and identify own type of diabetes; list 3 options for treating diabetes. Outcome: Progressing Towards Goal  Patient's blood glucose is being monitored. Problem: Falls - Risk of  Goal: *Absence of falls  Outcome: Progressing Towards Goal  Patient is free from falls. Problem: Pressure Ulcer - Risk of  Goal: *Prevention of pressure ulcer  Outcome: Progressing Towards Goal  Patient moves side to side with ease.

## 2017-02-17 NOTE — PROGRESS NOTES
[facility]   [address] [facility], [facility] [address], [facility]  Phone: [phone]   [phone]       Nephrology Progress Note  Kehinde Diamond.     1950     [id number]  Date of Admission : 1/20/2017 02/17/17    CC: Follow up for CKD/ARF      Assessment and Plan   CHACORTA on CKD:  - Cr essentially stable and still within his baseline  - BP stable and diuresing well  - cont current dose of po bumex  - labs in AM    Hypokalemia:  - K stable  - replete as needed    Hyponatremia :  - combination of  CHF + SIADH from chronic alcoholism  - Na stable at 132    Acute on Chronic Systolic CHF w/ severe CMP  - echo with EF 5-10%, severely dilated LV, severe TR  - Multivessel CAD : MRI showed viable myocardium   - s/p LAD and RCA stents 2/9 with Impella support  - on milrinone now    Anemia:  - transfused 1 unit on 2/15  - EGD/Colon with no obvious bleeding - polyp in colon that was not removed  - transfusion per CTS     Interval History:  Seen and examined. Cr stable. EGD and colon results noted. Feeling better today. Good UOP. BP stable. On milrinone. Review of Systems: Pertinent items are noted in HPI.     Current Medications:   Current Facility-Administered Medications   Medication Dose Route Frequency    0.9% sodium chloride infusion  100 mL/hr IntraVENous CONTINUOUS    sodium chloride (NS) flush 5-10 mL  5-10 mL IntraVENous Q8H    sodium chloride (NS) flush 5-10 mL  5-10 mL IntraVENous PRN    sodium chloride (NS) flush 5-10 mL  5-10 mL InterCATHeter Q8H    bumetanide (BUMEX) tablet 2 mg  2 mg Oral TID    insulin lispro protamine/insulin lispro (HUMALOG MIX 75/25) injection 10 Units  10 Units SubCUTAneous ACB    insulin lispro protamine/insulin lispro (HUMALOG MIX 75/25) injection 8 Units  8 Units SubCUTAneous ACD    glucose chewable tablet 16 g  4 Tab Oral PRN    dextrose (D50W) injection syrg 12.5-25 g  12.5-25 g IntraVENous PRN    glucagon (GLUCAGEN) injection 1 mg  1 mg IntraMUSCular PRN    insulin lispro (HUMALOG) injection   SubCUTAneous AC&HS    oxyCODONE-acetaminophen (PERCOCET) 5-325 mg per tablet 2 Tab  2 Tab Oral Q4H PRN    traMADol (ULTRAM) tablet  mg   mg Oral Q4H PRN    milrinone (PRIMACOR) 20 MG/100 ML D5W infusion  0.375 mcg/kg/min IntraVENous CONTINUOUS    nicotine (NICODERM CQ) 14 mg/24 hr patch 1 Patch  1 Patch TransDERmal DAILY    spironolactone (ALDACTONE) tablet 25 mg  25 mg Oral DAILY    bisacodyl (DULCOLAX) suppository 10 mg  10 mg Rectal DAILY PRN    magnesium hydroxide (MILK OF MAGNESIA) 400 mg/5 mL oral suspension 30 mL  30 mL Oral DAILY PRN    clopidogrel (PLAVIX) tablet 75 mg  75 mg Oral DAILY    docusate sodium (COLACE) capsule 100 mg  100 mg Oral DAILY PRN    gabapentin (NEURONTIN) capsule 100 mg  100 mg Oral BID    carvedilol (COREG) tablet 3.125 mg  3.125 mg Oral BID WITH MEALS    amiodarone (CORDARONE) tablet 200 mg  200 mg Oral Q12H    atorvastatin (LIPITOR) tablet 10 mg  10 mg Oral DAILY    acetaminophen (TYLENOL) tablet 650 mg  650 mg Oral Q6H PRN    aspirin delayed-release tablet 81 mg  81 mg Oral DAILY    magnesium oxide (MAG-OX) tablet 400 mg  400 mg Oral DAILY    albuterol (PROVENTIL VENTOLIN) nebulizer solution 2.5 mg  2.5 mg Nebulization Q4H PRN    diphenhydrAMINE (BENADRYL) capsule 25 mg  25 mg Oral QHS PRN    ELECTROLYTE REPLACEMENT PROTOCOL  1 Each Other PRN      Allergies   Allergen Reactions    Heparin (Porcine) Unknown (comments)       Objective:  Vitals:    Vitals:    02/17/17 1212 02/17/17 1223 02/17/17 1229 02/17/17 1237   BP: 107/67 102/61 102/61 100/58   Pulse: 82 82  85   Resp:    16   Temp:    98.4 °F (36.9 °C)   SpO2: 99% 96%  95%   Weight:       Height:         Intake and Output:  02/17 0701 - 02/17 1900  In: 190.2 [I.V.:190.2]  Out: -   02/15 1901 - 02/17 0700  In: 1599.4 [P.O.:1380; I.V.:219.4]  Out: 3275 [Urine:3275]    Physical Examination:  General: In NAD  Resp:  Lungs CTA  CV:  RRR,  no murmur or rub,+ LE edema  GI:  Soft, NT, + Bowel sounds,  Neurologic:  Non focal  Skin:  RLE cellulitis - resolving   Ext                   Edema +,      []    High complexity decision making was performed  []    Patient is at high-risk of decompensation with multiple organ involvement    Lab Data Personally Reviewed: I have reviewed all the pertinent labs, microbiology data and radiology studies during assessment. Recent Labs      02/17/17   0320  02/16/17   0513  02/15/17   1935  02/15/17   0424   NA  132*  129*   --   133*   K  3.5  3.8   --   4.3   CL  93*  92*   --   97   CO2  30  28   --   27   GLU  98  209*   --   167*   BUN  37*  44*   --   41*   CREA  1.58*  1.94*   --   1.98*   CA  8.6  8.8   --   8.5   MG  2.2  2.2   --   2.5*   ALB  3.0*  2.8*   --   2.8*   SGOT  18  16   --   15   ALT  20  16   --   17   INR  1.2*  1.2*  1.2*   --      Recent Labs      02/17/17   0320  02/16/17   0513  02/15/17   1935  02/15/17   0424  02/14/17   1740   WBC  5.8  6.5   --   7.7   --    HGB  7.9*  7.6*  7.7*  7.4*  7.7*   HCT  24.8*  23.6*  24.6*  23.1*  24.8*   PLT  161  123*   --   97*   --      No results found for: SDES  Lab Results   Component Value Date/Time    Culture result: NO GROWTH 5 DAYS 02/05/2017 03:22 AM    Culture result: NO GROWTH 4 DAYS 01/26/2017 03:05 PM    Culture result:  01/25/2017 09:34 PM     LIGHT  STREPTOCOCCI, BETA HEMOLYTIC GROUP C  . .. Penicillin and ampicillin are drugs of choice for treatment of beta-hemolytic streptococcal infections. Susceptibility testing of penicillins and beta-lactams approved by the FDA for treatment of beta-hemolytic streptococcal infections need not be performed routinely, because nonsusceptible isolates are extremely rare.  CLSI 2012      Culture result: MODERATE  CANDIDA TROPICALIS   01/25/2017 09:34 PM    Culture result: NO SIGNIFICANT GROWTH 01/19/2017 01:35 AM     Recent Results (from the past 24 hour(s))   GLUCOSE, POC    Collection Time: 02/16/17  5:08 PM Result Value Ref Range    Glucose (POC) 174 (H) 65 - 100 mg/dL    Performed by Giovanna Manzano (S0N)    GLUCOSE, POC    Collection Time: 02/16/17  9:45 PM   Result Value Ref Range    Glucose (POC) 169 (H) 65 - 100 mg/dL    Performed by Unkown     METABOLIC PANEL, COMPREHENSIVE    Collection Time: 02/17/17  3:20 AM   Result Value Ref Range    Sodium 132 (L) 136 - 145 mmol/L    Potassium 3.5 3.5 - 5.1 mmol/L    Chloride 93 (L) 97 - 108 mmol/L    CO2 30 21 - 32 mmol/L    Anion gap 9 5 - 15 mmol/L    Glucose 98 65 - 100 mg/dL    BUN 37 (H) 6 - 20 MG/DL    Creatinine 1.58 (H) 0.70 - 1.30 MG/DL    BUN/Creatinine ratio 23 (H) 12 - 20      GFR est AA 54 (L) >60 ml/min/1.73m2    GFR est non-AA 44 (L) >60 ml/min/1.73m2    Calcium 8.6 8.5 - 10.1 MG/DL    Bilirubin, total 0.6 0.2 - 1.0 MG/DL    ALT (SGPT) 20 12 - 78 U/L    AST (SGOT) 18 15 - 37 U/L    Alk. phosphatase 99 45 - 117 U/L    Protein, total 7.2 6.4 - 8.2 g/dL    Albumin 3.0 (L) 3.5 - 5.0 g/dL    Globulin 4.2 (H) 2.0 - 4.0 g/dL    A-G Ratio 0.7 (L) 1.1 - 2.2     MAGNESIUM    Collection Time: 02/17/17  3:20 AM   Result Value Ref Range    Magnesium 2.2 1.6 - 2.4 mg/dL   CBC W/O DIFF    Collection Time: 02/17/17  3:20 AM   Result Value Ref Range    WBC 5.8 4.1 - 11.1 K/uL    RBC 3.11 (L) 4.10 - 5.70 M/uL    HGB 7.9 (L) 12.1 - 17.0 g/dL    HCT 24.8 (L) 36.6 - 50.3 %    MCV 79.7 (L) 80.0 - 99.0 FL    MCH 25.4 (L) 26.0 - 34.0 PG    MCHC 31.9 30.0 - 36.5 g/dL    RDW 18.6 (H) 11.5 - 14.5 %    PLATELET 617 553 - 677 K/uL   PROTHROMBIN TIME + INR    Collection Time: 02/17/17  3:20 AM   Result Value Ref Range    INR 1.2 (H) 0.9 - 1.1      Prothrombin time 12.4 (H) 9.0 - 11.1 sec   GLUCOSE, POC    Collection Time: 02/17/17  6:41 AM   Result Value Ref Range    Glucose (POC) 113 (H) 65 - 100 mg/dL    Performed by [place]            I have reviewed the flowsheets. Chart and Pertinent Notes have been reviewed.    No change in PMH ,family and social history from Consult note.       Abimael Taylor MD

## 2017-02-17 NOTE — PROGRESS NOTES
[facility] Progress Note      NAME:  Fernanda Leal :   1950   MRN:   [id number]   PCP:  None  CARD:   Dr. Keisha Beard    Date:  2017     Fernanda Leal is a 61 y.o. male with a who presented for further evaluation of severe CAD and systolic heart failure. Subjective:   He was initially seen at [facility] 3 years ago and told that he had heart failure with LVEF 30%. He was lost to follow up due to lack of insurance and has not been seen by a physician in the interim. He complains of progressive fatigue, NAVARRO, PND, and edema over the past year, prompting presentation to [facility]. He also complained of lower extremity bullae, weeping, and pain. He denied palpitations, presyncope, syncope, or chest pain. Past 24 hours:  Complains of melena  Starting prep for colonoscopy    Objective:     Visit Vitals    /72 (BP 1 Location: Left arm, BP Patient Position: Sitting)    Pulse 82    Temp 97.5 °F (36.4 °C)    Resp 16    Ht 5' 9\" (1.753 m)    Wt 76.7 kg (169 lb 1.5 oz)    SpO2 98%    BMI 24.97 kg/m2      Hemodynamics:  CVP 5 mmHg    General:  fatigued    HEENT: Normocephalic, EOMI, PERRLA, Hearing intact, trachea mid-line    Neck:  supple, no significant adenopathy, carotids upstroke normal bilaterally, no bruits    CVP:  5 cm      Heart:  Diminished PMI    Normal S1 and S2, S3 gallop    Murmur: 1/6 systolic murmur    Lungs: Diminished breath sounds left lower lung    Abdomen: soft, non-tender. Bowel sounds normal. +HSM    Extremity: Trace edema bilaterally    Neuro: Alert and oriented to person, place, and time; normal strength and tone. Normal symmetric reflexes. Normal coordination and gait      02/15 0701 -  1900  In: 1859.2 [P.O.:1760;  I.V.:99.2]  Out: 3600 [Urine:3600]  O2 Flow Rate (L/min): 2 l/min O2 Device: Room air  Temp (24hrs), Av.1 °F (36.7 °C), Min:97.5 °F (36.4 °C), Max:98.7 °F (37.1 °C)          Care Plan discussed with:    Comments   Patient x    Family      RN x    Care Manager                    Consultant:          Past History:     Past Medical History   Diagnosis Date    Cardiomyopathy ([address] 1/20/2017     A. Echo (1/19/17):  EF 5-10% with severe GHK,. Mildly dil LA. Mild TR. PASP 46. History reviewed. No pertinent past surgical history. Social History   Substance Use Topics    Smoking status: Not on file    Smokeless tobacco: Not on file    Alcohol use Not on file        History reviewed. No pertinent family history. Allergies: Allergies   Allergen Reactions    Heparin (Porcine) Unknown (comments)          Data Review:     CXR:   CXR Results  (Last 48 hours)               02/15/17 0432  XR CHEST PORT Final result    Impression:  IMPRESSION:   1. No significant change in the appearance of the chest and remaining support   hardware. Narrative:  INDICATION: . impella, CHF,   COMPARISON: Previous chest xray, yesterday. LIMITATIONS: Portable technique. Zafar Green FINDINGS: Single frontal view of the chest.    .   Lines/tubes/surgical: Left-sided PICC. Interval removal of a balloon pump. Heart/mediastinum: Calcifications in the aortic arch. Lungs/pleura: Diffuse interstitial prominence. Opacity of the left midlung and   left base with obscured left hemidiaphragm. No visible pneumothorax. Additional Comments: None. .               Echocardiogram:   Echo Results  (Last 48 hours)    None          No results found for this visit on 01/20/17.       ECG:  EKG: ST with occasional PVC, NSIVCD, LAE    LABS:  Recent Results (from the past 24 hour(s))   METABOLIC PANEL, COMPREHENSIVE    Collection Time: 02/16/17  5:13 AM   Result Value Ref Range    Sodium 129 (L) 136 - 145 mmol/L    Potassium 3.8 3.5 - 5.1 mmol/L    Chloride 92 (L) 97 - 108 mmol/L    CO2 28 21 - 32 mmol/L    Anion gap 9 5 - 15 mmol/L    Glucose 209 (H) 65 - 100 mg/dL    BUN 44 (H) 6 - 20 MG/DL    Creatinine 1.94 (H) 0.70 - 1.30 MG/DL BUN/Creatinine ratio 23 (H) 12 - 20      GFR est AA 42 (L) >60 ml/min/1.73m2    GFR est non-AA 35 (L) >60 ml/min/1.73m2    Calcium 8.8 8.5 - 10.1 MG/DL    Bilirubin, total 0.5 0.2 - 1.0 MG/DL    ALT (SGPT) 16 12 - 78 U/L    AST (SGOT) 16 15 - 37 U/L    Alk.  phosphatase 95 45 - 117 U/L    Protein, total 6.8 6.4 - 8.2 g/dL    Albumin 2.8 (L) 3.5 - 5.0 g/dL    Globulin 4.0 2.0 - 4.0 g/dL    A-G Ratio 0.7 (L) 1.1 - 2.2     MAGNESIUM    Collection Time: 02/16/17  5:13 AM   Result Value Ref Range    Magnesium 2.2 1.6 - 2.4 mg/dL   CBC W/O DIFF    Collection Time: 02/16/17  5:13 AM   Result Value Ref Range    WBC 6.5 4.1 - 11.1 K/uL    RBC 2.99 (L) 4.10 - 5.70 M/uL    HGB 7.6 (L) 12.1 - 17.0 g/dL    HCT 23.6 (L) 36.6 - 50.3 %    MCV 78.9 (L) 80.0 - 99.0 FL    MCH 25.4 (L) 26.0 - 34.0 PG    MCHC 32.2 30.0 - 36.5 g/dL    RDW 18.2 (H) 11.5 - 14.5 %    PLATELET 401 (L) 172 - 400 K/uL   PROTHROMBIN TIME + INR    Collection Time: 02/16/17  5:13 AM   Result Value Ref Range    INR 1.2 (H) 0.9 - 1.1      Prothrombin time 12.1 (H) 9.0 - 11.1 sec   GLUCOSE, POC    Collection Time: 02/16/17  6:40 AM   Result Value Ref Range    Glucose (POC) 155 (H) 65 - 100 mg/dL    Performed by Sindy Patino, POC    Collection Time: 02/16/17 12:10 PM   Result Value Ref Range    Glucose (POC) 93 65 - 100 mg/dL    Performed by Adalgisa Chan (S0N)    OCCULT BLOOD, STOOL    Collection Time: 02/16/17 12:29 PM   Result Value Ref Range    Occult blood, stool POSITIVE (A) NEG     GLUCOSE, POC    Collection Time: 02/16/17  5:08 PM   Result Value Ref Range    Glucose (POC) 174 (H) 65 - 100 mg/dL    Performed by Adalgisa Chan (S0N)    GLUCOSE, POC    Collection Time: 02/16/17  9:45 PM   Result Value Ref Range    Glucose (POC) 169 (H) 65 - 100 mg/dL    Performed by Unkown       All Micro Results     Procedure Component Value Units Date/Time    CULTURE, BLOOD, PAIRED [[id number]] Collected:  02/05/17 0322    Order Status:  Completed Specimen:  Blood Updated:  02/10/17 0542     Special Requests: NO SPECIAL REQUESTS        Culture result: NO GROWTH 5 DAYS       CULTURE, BODY FLUID Janie Palm STAIN [[id number]] Collected:  01/26/17 1505    Order Status:  Completed Specimen:  Thoracentesis Updated:  01/30/17 1057     Special Requests: NO SPECIAL REQUESTS        GRAM STAIN OCCASIONAL  WBCS SEEN         NO ORGANISMS SEEN        Culture result: NO GROWTH 4 DAYS       CULTURE, Alex Rogers STAIN [[id number]] Collected:  01/25/17 2134    Order Status:  Completed Specimen:  Leg Updated:  01/27/17 1440     Special Requests: NO SPECIAL REQUESTS        GRAM STAIN RARE  WBCS SEEN         NO ORGANISMS SEEN        Culture result: LIGHT  STREPTOCOCCI, BETA HEMOLYTIC GROUP C  . .. Penicillin and ampicillin are drugs of choice for treatment of beta-hemolytic streptococcal infections. Susceptibility testing of penicillins and beta-lactams approved by the FDA for treatment of beta-hemolytic streptococcal infections need not be performed routinely, because nonsusceptible isolates are extremely rare. CLSI 2012         MODERATE  BETTIE TROPICALIS           Abdominal Ultrasound 2/4/17  IMPRESSION: Distended gallbladder with gallbladder wall thickening,  pericholecystic fluid and tenderness to probe palpation during exam. Findings  are consistent with acalculus cholecystitis. Thoracentesis 1/26/17  Specimen Source   1: Pleural Fluid   CYTOLOGIC INTERPRETATION:   Scattered mesothelial cells with degenerative changes and mixed inflammatory cells   General Categorization   No cells diagnostic for malignancy   Specimen Adequacy   Satisfactory for evaluation       Cardiac MRI 1/31/17  IMPRESSION  1. Markedly dilated left ventricle with 3-D end-diastolic volume index to body  surface area of 153 mL/sq m. Mild eccentric left ventricular hypertrophy with  3-D mass index to body surface area of 85 g/sq m. Severe left ventricular  systolic dysfunction.  Severe global hypokinesis with regional variation. 3-D  LVEF 10%. 2. Moderately dilated right ventricle by 3-D volumetric assessment. RV end  diastolic volume indexed to body surface area of 138 mL/sq m. Moderate to severe  right ventricular systolic dysfunction. Global hypokinesis. 3-D RVEF 25%. 3. Apical a tethered mitral valve leaflets. Mild mitral regurgitation. 4. Moderately severe 3+ tricuspid regurgitation. 5. On LGE study, the anterior wall, anteroseptal wall, anteroapical wall, and  anterior lateral wall are largely viable without significant myocardial  infarction. The entire inferior wall and inferoseptal wall are completely viable  without any infarct. The base to mid inferolateral wall demonstrate a near  transmural greater than 75% thickness infarct with minimal or no viable  myocardium in this territory. The distal inferolateral wall, apical lateral wall  does not demonstrate any infarct and are largely viable. Based on the viability  imaging, the entire LAD territory is largely viable and should recover upon  revascularization. The entire RCA territory is largely viable and should recover  upon revascularization. The LCx territory demonstrate medium-size infarct with  limited viability. 6. Large left-sided pleural effusion with passive atelectasis of the left lung. 7. Dilated branch pulmonary arteries suggest pulmonary hypertension. 8. Markedly dilated left atrium measuring 59 x 55 mm. Moderately dilated right  atrium measuring 46 x 56 mm.     Medications reviewed:    Current Facility-Administered Medications   Medication Dose Route Frequency    sodium chloride (NS) flush 5-10 mL  5-10 mL InterCATHeter Q8H    bumetanide (BUMEX) tablet 2 mg  2 mg Oral TID    insulin lispro protamine/insulin lispro (HUMALOG MIX 75/25) injection 10 Units  10 Units SubCUTAneous ACB    insulin lispro protamine/insulin lispro (HUMALOG MIX 75/25) injection 8 Units  8 Units SubCUTAneous ACD    glucose chewable tablet 16 g  4 Tab Oral PRN  dextrose (D50W) injection syrg 12.5-25 g  12.5-25 g IntraVENous PRN    glucagon (GLUCAGEN) injection 1 mg  1 mg IntraMUSCular PRN    insulin lispro (HUMALOG) injection   SubCUTAneous AC&HS    oxyCODONE-acetaminophen (PERCOCET) 5-325 mg per tablet 2 Tab  2 Tab Oral Q4H PRN    traMADol (ULTRAM) tablet  mg   mg Oral Q4H PRN    milrinone (PRIMACOR) 20 MG/100 ML D5W infusion  0.375 mcg/kg/min IntraVENous CONTINUOUS    nicotine (NICODERM CQ) 14 mg/24 hr patch 1 Patch  1 Patch TransDERmal DAILY    spironolactone (ALDACTONE) tablet 25 mg  25 mg Oral DAILY    bisacodyl (DULCOLAX) suppository 10 mg  10 mg Rectal DAILY PRN    magnesium hydroxide (MILK OF MAGNESIA) 400 mg/5 mL oral suspension 30 mL  30 mL Oral DAILY PRN    clopidogrel (PLAVIX) tablet 75 mg  75 mg Oral DAILY    docusate sodium (COLACE) capsule 100 mg  100 mg Oral DAILY PRN    gabapentin (NEURONTIN) capsule 100 mg  100 mg Oral BID    carvedilol (COREG) tablet 3.125 mg  3.125 mg Oral BID WITH MEALS    amiodarone (CORDARONE) tablet 200 mg  200 mg Oral Q12H    atorvastatin (LIPITOR) tablet 10 mg  10 mg Oral DAILY    acetaminophen (TYLENOL) tablet 650 mg  650 mg Oral Q6H PRN    aspirin delayed-release tablet 81 mg  81 mg Oral DAILY    magnesium oxide (MAG-OX) tablet 400 mg  400 mg Oral DAILY    albuterol (PROVENTIL VENTOLIN) nebulizer solution 2.5 mg  2.5 mg Nebulization Q4H PRN    diphenhydrAMINE (BENADRYL) capsule 25 mg  25 mg Oral QHS PRN    ELECTROLYTE REPLACEMENT PROTOCOL  1 Each Other PRN     HIDA Scan 2/7/17  Gallbladder emptying study was performed per protocol utilizing 5. 2mCi Tc-99 M  Choletec and 1.86 mcg CCK intravenously. There is normal tracer distribution  throughout the liver. Activity is noted in the gallbladder, common bile duct,  and small bowel. The gallbladder ejection fraction is 31% (normal greater than  or equal to 35%).      IMPRESSION: Abnormal gallbladder emptying study with an EF of only 31%.        IMPRESSION / PLAN:    1. CAD - severe native vessel disease, s/p PCI for  of RCA and left main with Impella support   Stable on ASA, clopidogrel, BB, and statin    2. Acute on chronic systolic heart failure (ICM with LVEF 5-10%) - Stage D, NYHA Class IV   Currently inotropic dependent, repeat echo with improved LVEF 20-24%   On IV milrinone 0.375, bumex 2 mg bid, and spironolactone 25 mg daily   Transfer to CVSU    3. Paroxysmal atrial fibrillation   Resume warfarin - goal INR 2-3    4. DM2 with Hga1c 13.5   On lantus, SSI, and glimepiride   Diabetic education   Accuchecks    5. Cellutitis   Resolved after treatment with cefazolin    6. CKD3   Serum creatinine improving on inotropic support and Impella   Continue diuresis    7. COPD with FEV1 51% predicted    8. Acalculous CHolecystitis   Completed course of IV zosyn     9. Hyponatremia   Improved with diuresis    10. Nicotine Addiction   Smoking cessation counseling   Nicotine patch - reduce dose to 14 mg/24 hour    11. CECILIA    Hgb 7.6, plt 123   Transfuse 1 unit prbcs    12. Anemia due to gi bleeding   Transfused 1 unit prbc   CT of abdomen/pelvis is negative for retroperitoneal bleed   Colonscopy tomorrow      Dorie Cortez MD, [place] [address] Director    [address] [place] [address], [address] [place] [address], [address] [place] [address]  Office [phone]  Fax [phone]  24 hour VAD/HF Pager: [phone]

## 2017-02-17 NOTE — PROGRESS NOTES
Jenna Mcwilliams.  1950  [id number]    Situation:    Scheduled Procedure: Procedure(s):  ESOPHAGOGASTRODUODENOSCOPY (EGD)  COLONOSCOPY  Verbal report received from: Smiley Wayne, WILLIAM  Preoperative diagnosis: Anemia  Blood in stool    Background:    Procedure: Procedure(s):  ESOPHAGOGASTRODUODENOSCOPY (EGD)  COLONOSCOPY  Physician performing procedure; Dr. Nader Man PO Intake: 2/16/17    Pregnancy Test:Not applicable If yes, result: none    Is the patient taking Blood Thinners: YES If yes, list: plavix, asa and last taken 2/16/17  Is the patient diabetic:yes       If yes, what was the last BS:  113  Time taken?  9810  Anything given? no           Does the patient have a Pacemaker/Defibrillator in place?: no   Does the patient need antibiotics before/during/after procedure: no   If the patient is having a colon, How much prep was drank? 100   What were the Colon prep results? Clear per RN   Does the patient have SCD in place:no   Is patient on CONTACT precautions:no        If yes, what kind of CONTACT precautions:     Assessment:  Are the vital signs stable prior to patient coming to ENDO?  yes  Is the patient alert/oriented and able to sign consent for the procedures:yes  How does the patient's abdomen feel prior to coming to ENDO? round and soft yes   Does the patient have a patient IV in place?  Yes, picc       Recommendation:  Family or Friend present yes     Permission to share finding with Family or Friend yes

## 2017-02-17 NOTE — ANESTHESIA POSTPROCEDURE EVALUATION
Post-Anesthesia Evaluation and Assessment    Patient: Yana Kent. MRN: [id number]  SSN: [id number]    YOB: 1950  Age: 61 y.o. Sex: male       Cardiovascular Function/Vital Signs  Visit Vitals    BP 90/48    Pulse 77    Temp 36.7 °C (98.1 °F)    Resp 16    Ht 5' 9\" (1.753 m)    Wt 77.4 kg (170 lb 10.2 oz)    SpO2 100%    BMI 25.2 kg/m2       Patient is status post MAC anesthesia for Procedure(s):  ESOPHAGOGASTRODUODENOSCOPY (EGD)  COLONOSCOPY. Nausea/Vomiting: None    Postoperative hydration reviewed and adequate. Pain:  Pain Scale 1: Numeric (0 - 10) (02/17/17 0316)  Pain Intensity 1: 2 (02/17/17 0316)   Managed    Neurological Status:   Neuro  Neurologic State: Alert; Appropriate for age (02/16/17 1359)  Orientation Level: Oriented X4 (02/16/17 0810)  Cognition: Appropriate decision making; Appropriate for age attention/concentration; Follows commands (02/16/17 0810)  Speech: Clear (02/16/17 0810)  Assessment L Pupil: Brisk;Round (02/03/17 0027)  Assessment R Pupil: Brisk;Round (02/03/17 0027)  LUE Motor Response: Purposeful (02/16/17 0810)  LLE Motor Response: Purposeful (02/16/17 0810)  RUE Motor Response: Purposeful (02/16/17 0810)  RLE Motor Response: Purposeful (02/16/17 0810)   At baseline    Mental Status and Level of Consciousness: Arousable    Pulmonary Status:   O2 Device: Nasal cannula (02/17/17 1150)   Adequate oxygenation and airway patent    Complications related to anesthesia: None    Post-anesthesia assessment completed.  No concerns    Signed By: Roni Mejía MD     February 17, 2017

## 2017-02-17 NOTE — ANESTHESIA POSTPROCEDURE EVALUATION
Post-Anesthesia Evaluation and Assessment    Patient: Gretchen Edwards. MRN: [id number]  SSN: [id number]    YOB: 1950  Age: 61 y.o. Sex: male       Cardiovascular Function/Vital Signs  Visit Vitals    BP 90/48    Pulse 77    Temp 36.7 °C (98.1 °F)    Resp 16    Ht 5' 9\" (1.753 m)    Wt 77.4 kg (170 lb 10.2 oz)    SpO2 100%    BMI 25.2 kg/m2       Patient is status post MAC anesthesia for Procedure(s):  ESOPHAGOGASTRODUODENOSCOPY (EGD)  COLONOSCOPY. Nausea/Vomiting: None    Postoperative hydration reviewed and adequate. Pain:  Pain Scale 1: Numeric (0 - 10) (02/17/17 0316)  Pain Intensity 1: 2 (02/17/17 0316)   Managed    Neurological Status:   Neuro  Neurologic State: Alert; Appropriate for age (02/16/17 1755)  Orientation Level: Oriented X4 (02/16/17 0810)  Cognition: Appropriate decision making; Appropriate for age attention/concentration; Follows commands (02/16/17 0810)  Speech: Clear (02/16/17 0810)  Assessment L Pupil: Brisk;Round (02/03/17 0027)  Assessment R Pupil: Brisk;Round (02/03/17 0027)  LUE Motor Response: Purposeful (02/16/17 0810)  LLE Motor Response: Purposeful (02/16/17 0810)  RUE Motor Response: Purposeful (02/16/17 0810)  RLE Motor Response: Purposeful (02/16/17 0810)   At baseline    Mental Status and Level of Consciousness: Arousable    Pulmonary Status:   O2 Device: Nasal cannula (02/17/17 1150)   Adequate oxygenation and airway patent    Complications related to anesthesia: None    Post-anesthesia assessment completed.  No concerns    Signed By: Luana Hand MD     February 17, 2017

## 2017-02-17 NOTE — ANESTHESIA PREPROCEDURE EVALUATION
Anesthetic History        Pertinent negatives: No increased risk of difficult airway, PONV, pseudocholinesterase deficiency and history of awareness of surgery under anesthesia       Review of Systems / Medical History  Patient summary reviewed, nursing notes reviewed and pertinent labs reviewed    Pulmonary              Pertinent negatives: No pneumonia, COPD, asthma, shortness of breath, recent URI, sleep apnea and smoker     Neuro/Psych           Pertinent negatives: No seizures, neuromuscular disease, TIA, CVA, headaches, psychiatric history and dementia   Cardiovascular                Pertinent negatives: No pacemaker  Exercise tolerance: <4 METS  Comments: He is on a milrinone drip. EF 5-10% per intraop LUÍS. GI/Hepatic/Renal             Pertinent negatives: No hiatal hernia, GERD, PUD, hepatitis, liver disease and renal disease  Comments: Anemia of unknown etiology Endo/Other    Diabetes: well controlled, type 2      Pertinent negatives: No hypothyroidism, hyperthyroidism, obesity, morbid obesity, blood dyscrasia, arthritis, cancer and anemia  Comments: anemia Other Findings   Comments: S/p femoral artery surgery 2/14/2017. No complications. LUÍS done intraop EF 10%. Pt states he is active and can walk to the mailbox. Breathing is baseline this morning. Pt does have a mild cough. He does not appear to be in distress.          Physical Exam    Airway  Mallampati: III  TM Distance: > 6 cm  Neck ROM: normal range of motion   Mouth opening: Normal     Cardiovascular    Rhythm: regular  Rate: normal         Dental    Dentition: Full lower dentures and Full upper dentures     Pulmonary      Decreased breath sounds           Abdominal  Abdominal exam normal       Other Findings            Anesthetic Plan    ASA: 4  Anesthesia type: MAC          Induction: Intravenous  Anesthetic plan and risks discussed with: Patient and Spouse

## 2017-02-17 NOTE — PROGRESS NOTES
TRANSFER - OUT REPORT:    Verbal report given to Savanah(name) on Fernanda Holley.  being transferred to [facility](unit) for routine progression of care       Report consisted of patients Situation, Background, Assessment and   Recommendations(SBAR). Information from the following report(s) Procedure Summary was reviewed with the receiving nurse. Lines:   PICC Triple Lumen 01/25/17 Left;Brachial (Active)   Central Line Being Utilized Yes 2/17/2017 11:37 AM   Criteria for Appropriate Use Long term IV/antibiotic administration 2/17/2017 11:37 AM   Site Assessment Clean, dry, & intact 2/17/2017 11:37 AM   Phlebitis Assessment 0 2/16/2017  8:40 PM   Infiltration Assessment 0 2/16/2017  8:40 PM   Date of Last Dressing Change 02/14/17 2/16/2017  8:40 PM   Dressing Status Clean, dry, & intact 2/16/2017  8:40 PM   External Catheter Length (cm) 0 centimeters 2/15/2017  8:27 AM   Dressing Type Disk with Chlorhexadine Gluconate (CHG); Transparent 2/16/2017  8:40 PM   Action Taken Open ports on tubing capped 2/16/2017  8:40 PM   Hub Color/Line Status White;Flushed;Capped 2/16/2017  8:40 PM   Positive Blood Return (Site #1) Yes 2/16/2017  8:40 PM   Hub Color/Line Status Sabine Saritha; Infusing 2/17/2017 11:37 AM   Positive Blood Return (Site #2) Yes 2/16/2017  8:40 PM   Hub Color/Line Status Red;Flushed;Capped 2/16/2017  8:40 PM   Positive Blood Return (Site #3) Yes 2/16/2017  8:40 PM   Alcohol Cap Used Yes 2/16/2017  8:40 PM        Opportunity for questions and clarification was provided.       Patient transported with:   Registered Nurse

## 2017-02-17 NOTE — PROCEDURES
[address] [facility] 12, [address] (EGD) Procedure Note    Azalia Pacheco.  1950  [id number]      Procedure: Endoscopic Gastroduodenoscopy --diagnostic    Indication:  melena     Pre-operative Diagnosis: see indication above    Post-operative Diagnosis: see findings below    : Lian Salter. Aditi Angela MD    Referring Provider:  None      Anesthesia/Sedation:  MAC anesthesia Propofol        Procedure Details     After informed consent was obtained for the procedure, with all risks and benefits of procedure explained the patient was taken to the endoscopy suite and placed in the left lateral decubitus position. Following sequential administration of sedation as per above, the endoscope was inserted into the mouth and advanced under direct vision to second portion of the duodenum. A careful inspection was made as the gastroscope was withdrawn, including a retroflexed view of the proximal stomach; findings and interventions are described below. Findings:   Esophagus:normal  Stomach: normal   Duodenum: normal      Therapies:  none    Specimens: none         EBL: None      Complications:   None; patient tolerated the procedure well. Impression:    -Normal upper endoscopy, with no endoscopic evidence of neoplasia or mucosal abnormality. Recommendations:  -Proceed to colonoscopy    Signed By: Lian Salter.  Aditi Angela MD     2/17/2017  12:47 PM

## 2017-02-17 NOTE — ROUTINE PROCESS
Blair Elliott.  1950  [id number]    Situation:  Verbal report received from: Santos Valdes  Procedure: Procedure(s):  ESOPHAGOGASTRODUODENOSCOPY (EGD)  COLONOSCOPY    Background:    Preoperative diagnosis: Anemia  Blood in stool  Postoperative diagnosis: 1. normal egd  2.. diverticulosis  3. ascending colon polyp not removed  4. cecal AVM non-bleeding  5. internal hemorrhoids      :  Dr. Katrin Gonzales  Assistant(s): Endoscopy RN-1: Missy Mcneill RN  Endoscopy RN-2: Linwood Dias RN    Specimens: * No specimens in log *  H. Pylori  no    Assessment:  Intra-procedure medications   Anesthesia gave intra-procedure sedation and medications, see anesthesia flow sheet yes    Intravenous fluids: NS@ Lutricia Spruce     Vital signs stable yes    Abdominal assessment: round and soft yes    Recommendation:  Discharge patient per MD order na  Return to floor yes  Family or Friend na  Permission to share finding with family or friend yes and n/a

## 2017-02-17 NOTE — PROGRESS NOTES
Advanced Heart Failure Progress Note      Admit Date: 2017       Procedures:  SVO2 swan placed via RFV  Impella placed via LFA  PCI performed via RFA     Successful MIKE pRCA: 2.5x38 Xience + 2.75x12 Xience overlapping. Successful MIKE ost left main: 4.0x12 Xience post-dil with 4.5x12 NC. By Dr. Theron Escamilla    Removal percutaneous ventricular   assist device (Impella pump) at separate and distinct session from   insertion (CPT CODE 29094) on 17 by Dr. Griffin Jacobs        Subjective:     Pt heading to have his endoscopy performed - denies   IMPRESSION/Plan:   s/p PCI w/ Impella support -  Cont. ASA and Plavix. On low dose Coreg,  No ACEi d/t renal dysfunction. Continue statin. Acute on chronic systolic heart failure (ICM) - Stage D, NYHA Class IV - continue Milrinone   0.375 mcg/kg/min - sodium improved  today, wieght up 1 lbs will increase diuretics. Will continue to dose Coreg, no ACEi d/t renal insufficiency, Daily weights. Strict I/Os     Anemia - no significant increase in Hg w/ 1 U PRBC - CT negative for retroperitoneal bleed. Stool heme +, consult GI. Acalculus Cholecystitis - resolved, Zosyn complete per ID     L pleural effusion - will check PA & lateral CXR tomorrow     H/O PAF - no recent AF - no anticoagulation for now d/t anemia - will start Coumadin when appropriate     CECILIA - REJI positive, platelets 201 today - close f/u NO HEPARIN     Hyponatremia - decreased today, will increase diuretic, cont. To monitor     Diabetes Mellitus - continue current treatments - Appreciate  DTC input.      Cellulitis - on Ancef ID, improved     H/o Tobacco abuse - smoking cessation, nicotine patch     CHACORTA on CKD3 - stable, renal following - increase current dose of to Bumex 2 mg TID    Pulmonary HTN -           Objective:   Vitals:  Blood pressure 94/53, pulse 83, temperature 98 °F (36.7 °C), resp. rate 12, height 5' 9\" (1.753 m), weight 167 lb 15.9 oz (76.2 kg), SpO2 95 %.   Temp (24hrs), Av.2 °F (36.8 °C), Min:98 °F (36.7 °C), Max:98.3 °F (36.8 °C)    Oxygen Therapy:  Oxygen Therapy  O2 Sat (%): 95 % (02/13/17 1000)  Pulse via Oximetry: 83 beats per minute (02/13/17 1000)  O2 Device: Nasal cannula (02/13/17 0800)  O2 Flow Rate (L/min): 1 l/min (02/13/17 0800)  ETCO2 (mmHg): 1 mmHg (02/07/17 0821)    EKG: sinus tach    Admission Weight: Last Weight   Weight: 178 lb 2.1 oz (80.8 kg) Weight: 167 lb 15.9 oz (76.2 kg)     Intake / Output / Drain:  Current Shift: 02/13 0701 - 02/13 1900  In: 83.8 [I.V.:83.8]  Out: 600 [Urine:600]  Last 24 hrs.: 02/11 1901 - 02/13 0700  In: 4496.5 [P.O.:2180; I.V.:2316.5]  Out: 8325 [Urine:8325]      EXAM:    HEENT:  EOMI       CVS:  S1/S2       LUNGS: soft bibasilar crackles                  ABD:  +BS, soft, NT/ND                  EXT:  Trace -b/l LE edema, + mild erythema                  Neuro:  No obvious deficits       Labs:   Lab Results   Component Value Date/Time    Sodium 132 02/17/2017 03:20 AM    Potassium 3.5 02/17/2017 03:20 AM    Chloride 93 02/17/2017 03:20 AM    CO2 30 02/17/2017 03:20 AM    Anion gap 9 02/17/2017 03:20 AM    Glucose 98 02/17/2017 03:20 AM    BUN 37 02/17/2017 03:20 AM    Creatinine 1.58 02/17/2017 03:20 AM    BUN/Creatinine ratio 23 02/17/2017 03:20 AM    GFR est AA 54 02/17/2017 03:20 AM    GFR est non-AA 44 02/17/2017 03:20 AM    Calcium 8.6 02/17/2017 03:20 AM     Lab Results   Component Value Date/Time    WBC 5.8 02/17/2017 03:20 AM    HGB 7.9 02/17/2017 03:20 AM    HCT 24.8 02/17/2017 03:20 AM    PLATELET 192 74/68/2425 03:20 AM    MCV 79.7 02/17/2017 03:20 AM       Pt seen w/ Dr. Abdullahi Garg and Dr. Adeline Woodard By: KENYETTA Beck 1721    Saw patient, agree with above  Risk of morbidity and mortality - high  Medical decision making - high complexity

## 2017-02-17 NOTE — PROGRESS NOTES
Infectious Diseases Progress Note    Antibiotic Summary:  Levaquin  --   Vancomycin  --   Ancef    -- 2/3  Keflex   2/3 --   Zosyn   --       Subjective:     Events noted with evidence of GI blood loss. No abd pain, N, V, D    Objective:     Vitals:   Visit Vitals    /72 (BP 1 Location: Left arm, BP Patient Position: Sitting)    Pulse 82    Temp 97.5 °F (36.4 °C)    Resp 16    Ht 5' 9\" (1.753 m)    Wt 76.7 kg (169 lb 1.5 oz)    SpO2 98%    BMI 24.97 kg/m2        Tmax:  Temp (24hrs), Av.1 °F (36.7 °C), Min:97.5 °F (36.4 °C), Max:98.7 °F (37.1 °C)      Exam:  General appearance: alert, no distress  Lungs: clear  Heart: RRR  Abdomen: nontender  Left leg: cellulitis resolved  Right leg: cellulitis resolved    IV Lines: Left PICC inserted     Labs:    Recent Labs      17   0513  02/15/17   1935  02/15/17   0424  17   1740  17   0303   WBC  6.5   --   7.7   --   8.4   HGB  7.6*  7.7*  7.4*  7.7*  7.9*   PLT  123*   --   97*   --   113*   BUN  44*   --   41*   --   38*   CREA  1.94*   --   1.98*   --   1.61*   TBILI  0.5   --   0.4   --   0.6   SGOT  16   --   15   --   21   AP  95   --   98   --   103     Culture right leg ulcers  = group C Streptococcus    US abdomen 2/3 = \"Distended gallbladder with gallbladder wall thickening,  pericholecystic fluid and tenderness to probe palpation during exam. Findings  are consistent with acalculus cholecystitis\"    HIDA on  = visualization with filling of the GB, CBD, and small bowel -- GB EF 31%    Assessment:     1. Bilateral venous stasis disease of the LEs +/- cellulitis: Resolved     2. RUQ tenderness and abnormal GB US: Clinically, he says the symptoms have been for months or even years. Remains on Zosyn. HIDA shows visualization of the GB with mildly decreased EF of 31%. The pain has resolved for the first time in \"5 years\"     3. OHD -- IHD/CAD; CHF; pulm HTN     4.  CRF with acute exacerbation     5. NIDDM -- new diagnosis     6. Probable COPD -- heavy smoking since age 7      9. Anemia -- HC/MC -- positive family history of colon cancer -- may need GI W/U    Plan:     1.  Watch off antibiotics      Kathleen Zapien MD

## 2017-02-18 LAB
ABO + RH BLD: NORMAL
ALBUMIN SERPL BCP-MCNC: 2.8 G/DL (ref 3.5–5)
ALBUMIN/GLOB SERPL: 0.7 {RATIO} (ref 1.1–2.2)
ALP SERPL-CCNC: 97 U/L (ref 45–117)
ALT SERPL-CCNC: 23 U/L (ref 12–78)
ANION GAP BLD CALC-SCNC: 13 MMOL/L (ref 5–15)
AST SERPL W P-5'-P-CCNC: 26 U/L (ref 15–37)
BILIRUB SERPL-MCNC: 0.7 MG/DL (ref 0.2–1)
BLD PROD TYP BPU: NORMAL
BLOOD GROUP ANTIBODIES SERPL: NORMAL
BPU ID: NORMAL
BUN SERPL-MCNC: 32 MG/DL (ref 6–20)
BUN/CREAT SERPL: 19 (ref 12–20)
CALCIUM SERPL-MCNC: 8.4 MG/DL (ref 8.5–10.1)
CHLORIDE SERPL-SCNC: 94 MMOL/L (ref 97–108)
CO2 SERPL-SCNC: 27 MMOL/L (ref 21–32)
CREAT SERPL-MCNC: 1.65 MG/DL (ref 0.7–1.3)
CROSSMATCH RESULT,%XM: NORMAL
ERYTHROCYTE [DISTWIDTH] IN BLOOD BY AUTOMATED COUNT: 18.7 % (ref 11.5–14.5)
GLOBULIN SER CALC-MCNC: 4 G/DL (ref 2–4)
GLUCOSE BLD STRIP.AUTO-MCNC: 149 MG/DL (ref 65–100)
GLUCOSE BLD STRIP.AUTO-MCNC: 160 MG/DL (ref 65–100)
GLUCOSE BLD STRIP.AUTO-MCNC: 165 MG/DL (ref 65–100)
GLUCOSE BLD STRIP.AUTO-MCNC: 210 MG/DL (ref 65–100)
GLUCOSE SERPL-MCNC: 122 MG/DL (ref 65–100)
HCT VFR BLD AUTO: 26.8 % (ref 36.6–50.3)
HGB BLD-MCNC: 8.5 G/DL (ref 12.1–17)
INR PPP: 1.3 (ref 0.9–1.1)
MAGNESIUM SERPL-MCNC: 2.2 MG/DL (ref 1.6–2.4)
MCH RBC QN AUTO: 25.4 PG (ref 26–34)
MCHC RBC AUTO-ENTMCNC: 31.7 G/DL (ref 30–36.5)
MCV RBC AUTO: 80.2 FL (ref 80–99)
PLATELET # BLD AUTO: 201 K/UL (ref 150–400)
POTASSIUM SERPL-SCNC: 4.1 MMOL/L (ref 3.5–5.1)
PROT SERPL-MCNC: 6.8 G/DL (ref 6.4–8.2)
PROTHROMBIN TIME: 12.8 SEC (ref 9–11.1)
RBC # BLD AUTO: 3.34 M/UL (ref 4.1–5.7)
SERVICE CMNT-IMP: ABNORMAL
SODIUM SERPL-SCNC: 134 MMOL/L (ref 136–145)
SPECIMEN EXP DATE BLD: NORMAL
STATUS OF UNIT,%ST: NORMAL
UNIT DIVISION, %UDIV: 0
WBC # BLD AUTO: 6.5 K/UL (ref 4.1–11.1)

## 2017-02-18 PROCEDURE — 85027 COMPLETE CBC AUTOMATED: CPT | Performed by: THORACIC SURGERY (CARDIOTHORACIC VASCULAR SURGERY)

## 2017-02-18 PROCEDURE — 74011250637 HC RX REV CODE- 250/637: Performed by: THORACIC SURGERY (CARDIOTHORACIC VASCULAR SURGERY)

## 2017-02-18 PROCEDURE — 74011250637 HC RX REV CODE- 250/637: Performed by: NURSE PRACTITIONER

## 2017-02-18 PROCEDURE — 74011636637 HC RX REV CODE- 636/637: Performed by: PHYSICIAN ASSISTANT

## 2017-02-18 PROCEDURE — 65660000000 HC RM CCU STEPDOWN

## 2017-02-18 PROCEDURE — 74011250636 HC RX REV CODE- 250/636: Performed by: INTERNAL MEDICINE

## 2017-02-18 PROCEDURE — 74011250637 HC RX REV CODE- 250/637: Performed by: PHYSICIAN ASSISTANT

## 2017-02-18 PROCEDURE — 74011250637 HC RX REV CODE- 250/637: Performed by: INTERNAL MEDICINE

## 2017-02-18 PROCEDURE — 80053 COMPREHEN METABOLIC PANEL: CPT | Performed by: PHYSICIAN ASSISTANT

## 2017-02-18 PROCEDURE — 82962 GLUCOSE BLOOD TEST: CPT

## 2017-02-18 PROCEDURE — 83735 ASSAY OF MAGNESIUM: CPT | Performed by: PHYSICIAN ASSISTANT

## 2017-02-18 PROCEDURE — 74011250637 HC RX REV CODE- 250/637: Performed by: SPECIALIST

## 2017-02-18 PROCEDURE — 36415 COLL VENOUS BLD VENIPUNCTURE: CPT | Performed by: PHYSICIAN ASSISTANT

## 2017-02-18 PROCEDURE — 85610 PROTHROMBIN TIME: CPT | Performed by: PHYSICIAN ASSISTANT

## 2017-02-18 RX ADMIN — INSULIN LISPRO 8 UNITS: 100 INJECTION, SUSPENSION SUBCUTANEOUS at 18:38

## 2017-02-18 RX ADMIN — Medication 400 MG: at 09:50

## 2017-02-18 RX ADMIN — Medication 81 MG: at 09:50

## 2017-02-18 RX ADMIN — CARVEDILOL 3.12 MG: 3.12 TABLET, FILM COATED ORAL at 09:50

## 2017-02-18 RX ADMIN — SPIRONOLACTONE 25 MG: 25 TABLET, FILM COATED ORAL at 09:51

## 2017-02-18 RX ADMIN — Medication 10 ML: at 06:00

## 2017-02-18 RX ADMIN — OXYCODONE HYDROCHLORIDE AND ACETAMINOPHEN 2 TABLET: 5; 325 TABLET ORAL at 04:09

## 2017-02-18 RX ADMIN — BUMETANIDE 2 MG: 1 TABLET ORAL at 09:49

## 2017-02-18 RX ADMIN — INSULIN LISPRO 10 UNITS: 100 INJECTION, SUSPENSION SUBCUTANEOUS at 09:50

## 2017-02-18 RX ADMIN — OXYCODONE HYDROCHLORIDE AND ACETAMINOPHEN 2 TABLET: 5; 325 TABLET ORAL at 09:49

## 2017-02-18 RX ADMIN — MILRINONE LACTATE 0.38 MCG/KG/MIN: 200 INJECTION, SOLUTION INTRAVENOUS at 10:52

## 2017-02-18 RX ADMIN — Medication 10 ML: at 14:00

## 2017-02-18 RX ADMIN — OXYCODONE HYDROCHLORIDE AND ACETAMINOPHEN 2 TABLET: 5; 325 TABLET ORAL at 18:37

## 2017-02-18 RX ADMIN — OXYCODONE HYDROCHLORIDE AND ACETAMINOPHEN 2 TABLET: 5; 325 TABLET ORAL at 23:42

## 2017-02-18 RX ADMIN — CLOPIDOGREL BISULFATE 75 MG: 75 TABLET, FILM COATED ORAL at 09:50

## 2017-02-18 RX ADMIN — ATORVASTATIN CALCIUM 10 MG: 10 TABLET, FILM COATED ORAL at 09:51

## 2017-02-18 RX ADMIN — AMIODARONE HYDROCHLORIDE 200 MG: 200 TABLET ORAL at 22:00

## 2017-02-18 RX ADMIN — BUMETANIDE 2 MG: 1 TABLET ORAL at 19:15

## 2017-02-18 RX ADMIN — INSULIN LISPRO 2 UNITS: 100 INJECTION, SOLUTION INTRAVENOUS; SUBCUTANEOUS at 13:26

## 2017-02-18 RX ADMIN — MILRINONE LACTATE 0.38 MCG/KG/MIN: 200 INJECTION, SOLUTION INTRAVENOUS at 22:07

## 2017-02-18 RX ADMIN — GABAPENTIN 100 MG: 100 CAPSULE ORAL at 18:37

## 2017-02-18 RX ADMIN — GABAPENTIN 100 MG: 100 CAPSULE ORAL at 09:50

## 2017-02-18 RX ADMIN — CARVEDILOL 3.12 MG: 3.12 TABLET, FILM COATED ORAL at 18:37

## 2017-02-18 RX ADMIN — AMIODARONE HYDROCHLORIDE 200 MG: 200 TABLET ORAL at 09:50

## 2017-02-18 RX ADMIN — Medication 10 ML: at 22:11

## 2017-02-18 NOTE — PROGRESS NOTES
Problem: Falls - Risk of  Goal: *Absence of falls  Outcome: Progressing Towards Goal  Pt bedside table and call bell within reach. Pt aware to call for assistance as needed. Pt wearing gripper socks.  Pt has steady gait and up walking in hallway

## 2017-02-18 NOTE — PROGRESS NOTES
Infectious Diseases Progress Note    Antibiotic Summary:  Levaquin  --   Vancomycin  --   Ancef    -- 2/3  Keflex   2/3 --   Zosyn   --       Subjective:     No complaints    Objective:     Vitals:   Visit Vitals    /59 (BP 1 Location: Right arm, BP Patient Position: At rest)    Pulse 95    Temp 98.6 °F (37 °C)    Resp 16    Ht 5' 9\" (1.753 m)    Wt 77.4 kg (170 lb 10.2 oz)    SpO2 95%    BMI 25.2 kg/m2        Tmax:  Temp (24hrs), Av.1 °F (36.7 °C), Min:97.6 °F (36.4 °C), Max:98.7 °F (37.1 °C)      Exam:  General appearance: alert, no distress  Lungs: clear  Heart: RRR  Abdomen: nontender  Left leg: cellulitis resolved  Right leg: cellulitis resolved    IV Lines: Left PICC inserted     Labs:    Recent Labs      17   0320  17   0513  02/15/17   1935  02/15/17   0424   WBC  5.8  6.5   --   7.7   HGB  7.9*  7.6*  7.7*  7.4*   PLT  161  123*   --   97*   BUN  37*  44*   --   41*   CREA  1.58*  1.94*   --   1.98*   TBILI  0.6  0.5   --   0.4   SGOT  18  16   --   15   AP  99  95   --   98     Culture right leg ulcers  = group C Streptococcus    US abdomen 2/3 = \"Distended gallbladder with gallbladder wall thickening,  pericholecystic fluid and tenderness to probe palpation during exam. Findings  are consistent with acalculus cholecystitis\"    HIDA on  = visualization with filling of the GB, CBD, and small bowel -- GB EF 31%    Assessment:     1. Bilateral venous stasis disease of the LEs +/- cellulitis: Resolved     2. RUQ tenderness and abnormal GB US: Clinically, he says the symptoms have been for months or even years. Remains on Zosyn. HIDA shows visualization of the GB with mildly decreased EF of 31%. The pain has resolved for the first time in \"5 years\"     3. OHD -- IHD/CAD; CHF; pulm HTN     4. CRF with acute exacerbation     5. NIDDM -- new diagnosis     6. Probable COPD -- heavy smoking since age 7      9.  Anemia -- HC/MC -- positive family history of colon cancer -- may need GI W/U    Plan:     1. Watch off antibiotics      I'll check the patient again on Monday. Please call if problems arise over the weekend.     Kathleen Zapien MD

## 2017-02-18 NOTE — PROGRESS NOTES
2240: Blood transfusion complete without any issues. 2300: Pt sitting in bed with head down. Asked pt if he's doing ok and he states \"yeah I'm fine. I'm just really lonesome\". Spoke with pt about option of pastoral care visiting and pt declined. 0540: CBC clotted. Redraw with peripheral stick and sent. 0730: Bedside and Verbal shift change report given to Loida Sims (oncoming nurse) by Judy Hughes (offgoing nurse). Report included the following information SBAR, Kardex, Procedure Summary, Intake/Output, MAR, Recent Results and Cardiac Rhythm NSR.

## 2017-02-18 NOTE — PROGRESS NOTES
[facility] [address] M.D.  [phone]               GASTROENTEROLOGY PROGRESS NOTE        NAME: José Miguel Juárez :  1950   MRN:  [id number]       Subjective:   No BMs since EGD/colonoscopy. Objective:   VITALS:   Last 24hrs VS reviewed. Most recent are:  Visit Vitals    /69 (BP 1 Location: Right arm, BP Patient Position: Sitting)    Pulse 86    Temp 97.5 °F (36.4 °C)    Resp 18    Ht 5' 9\" (1.753 m)    Wt 77.8 kg (171 lb 8.3 oz)    SpO2 100%    BMI 25.33 kg/m2       Intake/Output Summary (Last 24 hours) at 17 0956  Last data filed at 17 0703   Gross per 24 hour   Intake          1618.44 ml   Output             1650 ml   Net           -31.56 ml       PHYSICAL EXAM:  General: Alert, in no acute distress    Lungs:            CTA Bilaterally  Heart:  Normal S1, S2    Abdomen: Soft, Non distended, Non tender. Normoactive bowel sounds, no HSM,   no rebound/guarding  MSK:   Normal muscle tone  Skin:   Warm to touch  Psych:   Good insight. Not anxious nor agitated. Lab Data Reviewed:   Recent Labs      17   0530  17   0320   WBC  6.5  5.8   HGB  8.5*  7.9*   HCT  26.8*  24.8*   PLT  201  161     Recent Labs      17   0352  17   0320   NA  134*  132*   K  4.1  3.5   CL  94*  93*   CO2  27  30   BUN  32*  37*   CREA  1.65*  1.58*   GLU  122*  98   CA  8.4*  8.6   MG  2.2  2.2     Recent Labs      17   0352  17   0320   SGOT  26  18   AP  97  99   TP  6.8  7.2   ALB  2.8*  3.0*   GLOB  4.0  4.2*     Recent Labs      17   0352  17   0320   INR  1.3*  1.2*   PTP  12.8*  12.4*      No results for input(s): FE, TIBC, PSAT, FERR in the last 72 hours. No results for input(s): CPK, CKMB in the last 72 hours.     No lab exists for component: SIS    See Electronic Medical Record for all procedure/radiology reports and details which were not copied into this note but were reviewed prior to the creation of the Plan. Assessment:   · Anemia with + FOBT and possible melena. No obvious source from EGD/colonoscopy. · Systolic congestive heart failure-ASA/Plavix  · HCV Ab positive. Will see if active infection. Patient Active Problem List   Diagnosis Code    Acute systolic (congestive) heart failure (HCC) I50.21    Bilateral lower extremity edema R60.0    Shortness of breath R06.02    Acute renal failure (ARF) (HCC) N17.9    Hyperglycemia due to type 2 diabetes mellitus ([facility] [address] E11.65    Venous stasis dermatitis of both lower extremities I83.11, I83.12    Hypoxia R09.02    Pulmonary edema J81.1    Sinus tachycardia R00.0    Ischemic cardiomyopathy X71.7    Systolic heart failure (HCC) I50.20       Plan:   · FLD tomorrow in preparation for M2A capsule on Monday. · Daily hemoglobin, transfuse PRN. · HCV viral load       Signed by:  Libra Foy MD         2/18/2017  9:56 AM

## 2017-02-19 LAB
ALBUMIN SERPL BCP-MCNC: 2.8 G/DL (ref 3.5–5)
ALBUMIN/GLOB SERPL: 0.7 {RATIO} (ref 1.1–2.2)
ALP SERPL-CCNC: 101 U/L (ref 45–117)
ALT SERPL-CCNC: 20 U/L (ref 12–78)
ANION GAP BLD CALC-SCNC: 13 MMOL/L (ref 5–15)
AST SERPL W P-5'-P-CCNC: 16 U/L (ref 15–37)
BILIRUB SERPL-MCNC: 0.4 MG/DL (ref 0.2–1)
BUN SERPL-MCNC: 35 MG/DL (ref 6–20)
BUN/CREAT SERPL: 18 (ref 12–20)
CALCIUM SERPL-MCNC: 8.9 MG/DL (ref 8.5–10.1)
CHLORIDE SERPL-SCNC: 95 MMOL/L (ref 97–108)
CO2 SERPL-SCNC: 26 MMOL/L (ref 21–32)
CREAT SERPL-MCNC: 1.99 MG/DL (ref 0.7–1.3)
ERYTHROCYTE [DISTWIDTH] IN BLOOD BY AUTOMATED COUNT: 18.6 % (ref 11.5–14.5)
GLOBULIN SER CALC-MCNC: 4.2 G/DL (ref 2–4)
GLUCOSE BLD STRIP.AUTO-MCNC: 153 MG/DL (ref 65–100)
GLUCOSE BLD STRIP.AUTO-MCNC: 164 MG/DL (ref 65–100)
GLUCOSE BLD STRIP.AUTO-MCNC: 222 MG/DL (ref 65–100)
GLUCOSE BLD STRIP.AUTO-MCNC: 72 MG/DL (ref 65–100)
GLUCOSE BLD STRIP.AUTO-MCNC: 82 MG/DL (ref 65–100)
GLUCOSE SERPL-MCNC: 165 MG/DL (ref 65–100)
HCT VFR BLD AUTO: 27.2 % (ref 36.6–50.3)
HGB BLD-MCNC: 8.6 G/DL (ref 12.1–17)
INR PPP: 1.3 (ref 0.9–1.1)
MAGNESIUM SERPL-MCNC: 2.2 MG/DL (ref 1.6–2.4)
MCH RBC QN AUTO: 25.7 PG (ref 26–34)
MCHC RBC AUTO-ENTMCNC: 31.6 G/DL (ref 30–36.5)
MCV RBC AUTO: 81.2 FL (ref 80–99)
PLATELET # BLD AUTO: 222 K/UL (ref 150–400)
POTASSIUM SERPL-SCNC: 3.6 MMOL/L (ref 3.5–5.1)
PROT SERPL-MCNC: 7 G/DL (ref 6.4–8.2)
PROTHROMBIN TIME: 12.8 SEC (ref 9–11.1)
RBC # BLD AUTO: 3.35 M/UL (ref 4.1–5.7)
SERVICE CMNT-IMP: ABNORMAL
SERVICE CMNT-IMP: NORMAL
SERVICE CMNT-IMP: NORMAL
SODIUM SERPL-SCNC: 134 MMOL/L (ref 136–145)
WBC # BLD AUTO: 5.5 K/UL (ref 4.1–11.1)

## 2017-02-19 PROCEDURE — 74011000250 HC RX REV CODE- 250: Performed by: THORACIC SURGERY (CARDIOTHORACIC VASCULAR SURGERY)

## 2017-02-19 PROCEDURE — 74011250637 HC RX REV CODE- 250/637: Performed by: INTERNAL MEDICINE

## 2017-02-19 PROCEDURE — 74011250636 HC RX REV CODE- 250/636: Performed by: THORACIC SURGERY (CARDIOTHORACIC VASCULAR SURGERY)

## 2017-02-19 PROCEDURE — 65660000000 HC RM CCU STEPDOWN

## 2017-02-19 PROCEDURE — 36415 COLL VENOUS BLD VENIPUNCTURE: CPT | Performed by: PHYSICIAN ASSISTANT

## 2017-02-19 PROCEDURE — 74011250637 HC RX REV CODE- 250/637: Performed by: THORACIC SURGERY (CARDIOTHORACIC VASCULAR SURGERY)

## 2017-02-19 PROCEDURE — 85610 PROTHROMBIN TIME: CPT | Performed by: PHYSICIAN ASSISTANT

## 2017-02-19 PROCEDURE — 3E03317 INTRODUCTION OF OTHER THROMBOLYTIC INTO PERIPHERAL VEIN, PERCUTANEOUS APPROACH: ICD-10-PCS | Performed by: THORACIC SURGERY (CARDIOTHORACIC VASCULAR SURGERY)

## 2017-02-19 PROCEDURE — 85027 COMPLETE CBC AUTOMATED: CPT | Performed by: PHYSICIAN ASSISTANT

## 2017-02-19 PROCEDURE — 74011250637 HC RX REV CODE- 250/637: Performed by: PHYSICIAN ASSISTANT

## 2017-02-19 PROCEDURE — 74011250637 HC RX REV CODE- 250/637: Performed by: NURSE PRACTITIONER

## 2017-02-19 PROCEDURE — 87522 HEPATITIS C REVRS TRNSCRPJ: CPT | Performed by: INTERNAL MEDICINE

## 2017-02-19 PROCEDURE — 74011250637 HC RX REV CODE- 250/637: Performed by: SPECIALIST

## 2017-02-19 PROCEDURE — 83735 ASSAY OF MAGNESIUM: CPT | Performed by: PHYSICIAN ASSISTANT

## 2017-02-19 PROCEDURE — 74011636637 HC RX REV CODE- 636/637: Performed by: PHYSICIAN ASSISTANT

## 2017-02-19 PROCEDURE — 74011250636 HC RX REV CODE- 250/636: Performed by: INTERNAL MEDICINE

## 2017-02-19 PROCEDURE — 82962 GLUCOSE BLOOD TEST: CPT

## 2017-02-19 PROCEDURE — 80053 COMPREHEN METABOLIC PANEL: CPT | Performed by: PHYSICIAN ASSISTANT

## 2017-02-19 RX ORDER — BUMETANIDE 1 MG/1
2 TABLET ORAL 2 TIMES DAILY
Status: DISCONTINUED | OUTPATIENT
Start: 2017-02-19 | End: 2017-02-22 | Stop reason: HOSPADM

## 2017-02-19 RX ADMIN — ALTEPLASE 1 MG: 2.2 INJECTION, POWDER, LYOPHILIZED, FOR SOLUTION INTRAVENOUS at 23:54

## 2017-02-19 RX ADMIN — OXYCODONE HYDROCHLORIDE AND ACETAMINOPHEN 2 TABLET: 5; 325 TABLET ORAL at 16:17

## 2017-02-19 RX ADMIN — OXYCODONE HYDROCHLORIDE AND ACETAMINOPHEN 2 TABLET: 5; 325 TABLET ORAL at 08:51

## 2017-02-19 RX ADMIN — OXYCODONE HYDROCHLORIDE AND ACETAMINOPHEN 2 TABLET: 5; 325 TABLET ORAL at 04:00

## 2017-02-19 RX ADMIN — INSULIN LISPRO 8 UNITS: 100 INJECTION, SUSPENSION SUBCUTANEOUS at 18:49

## 2017-02-19 RX ADMIN — INSULIN LISPRO 10 UNITS: 100 INJECTION, SUSPENSION SUBCUTANEOUS at 08:50

## 2017-02-19 RX ADMIN — SPIRONOLACTONE 25 MG: 25 TABLET, FILM COATED ORAL at 08:51

## 2017-02-19 RX ADMIN — MILRINONE LACTATE 0.38 MCG/KG/MIN: 200 INJECTION, SOLUTION INTRAVENOUS at 20:56

## 2017-02-19 RX ADMIN — Medication 10 ML: at 07:00

## 2017-02-19 RX ADMIN — Medication 10 ML: at 23:24

## 2017-02-19 RX ADMIN — MILRINONE LACTATE 0.38 MCG/KG/MIN: 200 INJECTION, SOLUTION INTRAVENOUS at 08:52

## 2017-02-19 RX ADMIN — CARVEDILOL 3.12 MG: 3.12 TABLET, FILM COATED ORAL at 16:17

## 2017-02-19 RX ADMIN — OXYCODONE HYDROCHLORIDE AND ACETAMINOPHEN 2 TABLET: 5; 325 TABLET ORAL at 20:59

## 2017-02-19 RX ADMIN — GABAPENTIN 100 MG: 100 CAPSULE ORAL at 18:49

## 2017-02-19 RX ADMIN — AMIODARONE HYDROCHLORIDE 200 MG: 200 TABLET ORAL at 08:51

## 2017-02-19 RX ADMIN — Medication 400 MG: at 08:51

## 2017-02-19 RX ADMIN — Medication 81 MG: at 08:51

## 2017-02-19 RX ADMIN — INSULIN LISPRO 2 UNITS: 100 INJECTION, SOLUTION INTRAVENOUS; SUBCUTANEOUS at 07:12

## 2017-02-19 RX ADMIN — ATORVASTATIN CALCIUM 10 MG: 10 TABLET, FILM COATED ORAL at 08:51

## 2017-02-19 RX ADMIN — CARVEDILOL 3.12 MG: 3.12 TABLET, FILM COATED ORAL at 08:51

## 2017-02-19 RX ADMIN — AMIODARONE HYDROCHLORIDE 200 MG: 200 TABLET ORAL at 20:56

## 2017-02-19 RX ADMIN — Medication 10 ML: at 14:00

## 2017-02-19 RX ADMIN — BUMETANIDE 2 MG: 1 TABLET ORAL at 18:50

## 2017-02-19 RX ADMIN — CLOPIDOGREL BISULFATE 75 MG: 75 TABLET, FILM COATED ORAL at 08:51

## 2017-02-19 RX ADMIN — GABAPENTIN 100 MG: 100 CAPSULE ORAL at 08:51

## 2017-02-19 RX ADMIN — BUMETANIDE 2 MG: 1 TABLET ORAL at 08:51

## 2017-02-19 NOTE — PROGRESS NOTES
0800 Bedside shift change report given to Tim Villegas RN  (oncoming nurse) by Aixa Wick RN (offgoing nurse). Report included the following information SBAR, Kardex, ED Summary, OR Summary, Procedure Summary, Intake/Output, MAR, Recent Results, Med Rec Status and Cardiac Rhythm NSR.     1150 Dr. Ines Posada at bedside. Verbal for CathFlow. Patient's blood glucose 72 gave Orange Juice. Will recheck in 15 min. 1600 Patient bathed, lines changed, wound care, lotion to legs and new TEDs hose. Problem: Diabetes Self-Management  Goal: *Disease process and treatment process  Define diabetes and identify own type of diabetes; list 3 options for treating diabetes. Outcome: Progressing Towards Goal  Patient's blood glucose is being controlled. Problem: Falls - Risk of  Goal: *Absence of falls  Outcome: Progressing Towards Goal  Patient is free from falls. Problem: Infection - Risk of, Central Venous Catheter-Associated Bloodstream Infection  Goal: *Absence of infection signs and symptoms  Outcome: Progressing Towards Goal  No signs of infections.

## 2017-02-19 NOTE — PROGRESS NOTES
Advanced Heart Failure Progress Note      Admit Date: 1/20/2017       Procedures:  SVO2 swan placed via RFV  Impella placed via LFA  PCI performed via RFA     Successful MIKE pRCA: 2.5x38 Xience + 2.75x12 Xience overlapping. Successful MIKE ost left main: 4.0x12 Xience post-dil with 4.5x12 NC. By Dr. Courtney Lorenzo    Removal percutaneous ventricular   assist device (Impella pump) at separate and distinct session from   insertion (CPT CODE 92244) on 2/14/17 by Dr. Parmjit Cisse        Subjective:     Seen with Dr. Parmjit Cisse. Last 24 hrs:   Cr worsening  (-)1.3L on TID bumex  Hgb stable    This AM:   Ambulated around unit. Assessed sitting in bed. Frustrated with progress. IMPRESSION/Plan:   s/p PCI w/ Impella support -  Cont. ASA and Plavix. On low dose Coreg,  No ACEi d/t renal dysfunction. Continue statin. Acute on chronic systolic heart failure (ICM) - Stage D, NYHA Class IV - continue Milrinone   0.375 mcg/kg/min - sodium improving but Cr worse. Decrease Bumex to 2 mg BID. Continue Coreg. No ACE-I d/t renal insufficiency. Daily weights. Strict I/Os     Anemia - Hgb/Hct stable. FLD for M2A - NPO p MN for M2A study in AM.  Further recs per GI. Acalculous Cholecystitis - resolved, Zosyn complete per ID.     L pleural effusion - improved on CXR. H/O PAF - no recent AF - no anticoagulation for now - will await results of M2A prior to beginning Coumadin. CECILIA - REJI positive, platelets OK today - close f/u NO HEPARIN     Hyponatremia - Stable - decrease Bumex to BID. Continue to monitor     Diabetes Mellitus - continue current treatments - Appreciate  DTC input.      Cellulitis - on Ancef ID, improved     H/o Tobacco abuse - smoking cessation, nicotine patch.     CHACORTA on CKD3 - stable, renal following - decrease Bumex to BID.   Urology consult to r/o post-renal cause of cr elevation - pt reports difficulty with urine stream.              Objective:   Vitals:  Patient Vitals for the past 12 hrs:   Temp Pulse Resp BP SpO2   02/19/17 1143 98.1 °F (36.7 °C) 76 18 - 98 %   02/19/17 0749 97.8 °F (36.6 °C) 79 18 104/63 97 %   02/19/17 0353 98.1 °F (36.7 °C) 83 18 107/68 96 %     EKG: sinus tach    Admission Weight: Last Weight   Weight: 178 lb 2.1 oz (80.8 kg) Weight: 167 lb 15.9 oz (76.2 kg)     Intake / Output / Drain:    Intake/Output Summary (Last 24 hours) at 02/19/17 1435  Last data filed at 02/19/17 1143   Gross per 24 hour   Intake            960.6 ml   Output             3005 ml   Net          -2044.4 ml         EXAM:    HEENT:  EOMI       CVS:  S1/S2        LUNGS: crackles bilateral bases. Good inspiratory effort. RA.                   ABD:  +BS, NTTP. EXT:  +1 non-pitting LE edema. TEDs in place.                    Neuro:  No obvious deficits    Labs:   Lab Results   Component Value Date/Time    Sodium 134 02/19/2017 03:48 AM    Potassium 3.6 02/19/2017 03:48 AM    Chloride 95 02/19/2017 03:48 AM    CO2 26 02/19/2017 03:48 AM    Anion gap 13 02/19/2017 03:48 AM    Glucose 165 02/19/2017 03:48 AM    BUN 35 02/19/2017 03:48 AM    Creatinine 1.99 02/19/2017 03:48 AM    BUN/Creatinine ratio 18 02/19/2017 03:48 AM    GFR est AA 41 02/19/2017 03:48 AM    GFR est non-AA 34 02/19/2017 03:48 AM    Calcium 8.9 02/19/2017 03:48 AM     Lab Results   Component Value Date/Time    WBC 5.5 02/19/2017 03:48 AM    HGB 8.6 02/19/2017 03:48 AM    HCT 27.2 02/19/2017 03:48 AM    PLATELET 466 87/28/7361 03:48 AM    MCV 81.2 02/19/2017 03:48 AM       Signed By: ZENAIDA Rollins-BC    Saw patient, agree with above  Risk of morbidity and mortality - high  Medical decision making - high complexity

## 2017-02-19 NOTE — PROGRESS NOTES
Jennifer Mccollum [address] M.D.  [phone]               GASTROENTEROLOGY PROGRESS NOTE        NAME: Lenny Calvert. :  1950   MRN:  [id number]       Subjective:   No BMs since EGD/colonoscopy. Objective:   VITALS:   Last 24hrs VS reviewed. Most recent are:  Visit Vitals    /63 (BP 1 Location: Right arm, BP Patient Position: At rest)    Pulse 79    Temp 97.8 °F (36.6 °C)    Resp 18    Ht 5' 9\" (1.753 m)    Wt 77.6 kg (171 lb 1.2 oz)    SpO2 97%    BMI 25.26 kg/m2       Intake/Output Summary (Last 24 hours) at 17 0956  Last data filed at 17 0530   Gross per 24 hour   Intake           894.01 ml   Output             2005 ml   Net         -1110.99 ml       PHYSICAL EXAM:  General: Alert, in no acute distress    Lungs:            CTA Bilaterally  Heart:  Normal S1, S2    Abdomen: Soft, Non distended, Non tender. Normoactive bowel sounds, no HSM,   no rebound/guarding  MSK:   Normal muscle tone  Skin:   Warm to touch  Psych:   Good insight. Not anxious nor agitated. Lab Data Reviewed:   Recent Labs      178  17   0530   WBC  5.5  6.5   HGB  8.6*  8.5*   HCT  27.2*  26.8*   PLT  222  201     Recent Labs      178  17   0352   NA  134*  134*   K  3.6  4.1   CL  95*  94*   CO2  26  27   BUN  35*  32*   CREA  1.99*  1.65*   GLU  165*  122*   CA  8.9  8.4*   MG  2.2  2.2     Recent Labs      178  17   0352   SGOT  16  26   AP  101  97   TP  7.0  6.8   ALB  2.8*  2.8*   GLOB  4.2*  4.0     Recent Labs      178  17   0352   INR  1.3*  1.3*   PTP  12.8*  12.8*      No results for input(s): FE, TIBC, PSAT, FERR in the last 72 hours. No results for input(s): CPK, CKMB in the last 72 hours.     No lab exists for component: SIS    See Electronic Medical Record for all procedure/radiology reports and details which were not copied into this note but were reviewed prior to the creation of the Plan. Assessment:   · Anemia with + FOBT and possible melena. No obvious source from EGD/colonoscopy. · Systolic congestive heart failure-ASA/Plavix  · HCV Ab positive. Will see if active infection. Patient Active Problem List   Diagnosis Code    Acute systolic (congestive) heart failure (HCC) I50.21    Bilateral lower extremity edema R60.0    Shortness of breath R06.02    Acute renal failure (ARF) (HCC) N17.9    Hyperglycemia due to type 2 diabetes mellitus ([facility] [address] E11.65    Venous stasis dermatitis of both lower extremities I83.11, I83.12    Hypoxia R09.02    Pulmonary edema J81.1    Sinus tachycardia R00.0    Ischemic cardiomyopathy W09.5    Systolic heart failure (HCC) I50.20       Plan:   · NPO after midnight for M2A capsule on Monday by Dr. Marcel Ho. · Daily hemoglobin, transfuse PRN. · HCV viral load pending       Signed by:  Julia Drake MD         2/19/2017  9:56 AM

## 2017-02-19 NOTE — PROGRESS NOTES
Problem: Falls - Risk of  Goal: *Absence of falls  Outcome: Progressing Towards Goal  Call bell within reach, personal belongings in reach, bed locked and in low position. Non skid footwear in placed. Goal: *Knowledge of fall prevention  Outcome: Progressing Towards Goal  Uses the call bell appropriately to call for assistance         Problem: Pressure Ulcer - Risk of  Goal: *Prevention of pressure ulcer  Outcome: Progressing Towards Goal  Q4H skin assessed, Turns self at appropriate intervals and blood glucose controlled         Bedside and Verbal shift change report given to Damien Stiles RN (oncoming nurse) by Paulo Grover RN (offgoing nurse). Report included the following information SBAR, Kardex, ED Summary, Procedure Summary, Intake/Output, MAR, Recent Results and Med Rec Status.

## 2017-02-19 NOTE — PROGRESS NOTES
Advanced Heart Failure Progress Note      Admit Date: 2017       Procedures:  SVO2 swan placed via RFV  Impella placed via LFA  PCI performed via RFA     Successful MIKE pRCA: 2.5x38 Xience + 2.75x12 Xience overlapping. Successful MIKE ost left main: 4.0x12 Xience post-dil with 4.5x12 NC. By Dr. Jessica Fraga    Removal percutaneous ventricular   assist device (Impella pump) at separate and distinct session from   insertion (CPT CODE 62330) on 17 by Dr. Crespo Hearing        Subjective:     Pt heading to have his endoscopy performed - denies   IMPRESSION/Plan:   s/p PCI w/ Impella support -  Cont. ASA and Plavix. On low dose Coreg,  No ACEi d/t renal dysfunction. Continue statin. Acute on chronic systolic heart failure (ICM) - Stage D, NYHA Class IV - continue Milrinone   0.375 mcg/kg/min - sodium continues to improve - continue current dose of Bumex. Will continue to dose Coreg, no ACEi d/t renal insufficiency, Daily weights. Strict I/Os     Anemia - Hgb/Hct stable. CT negative for retroperitoneal bleed. FLD in AM for M2A Monday per GI. Acalculous Cholecystitis - resolved, Zosyn complete per ID     L pleural effusion - improved on CXR. H/O PAF - no recent AF - no anticoagulation for now - will await results of M2A prior to beginning Coumadin. CECILIA - REJI positive, platelets OK today - close f/u NO HEPARIN     Hyponatremia - Stable. Continue current dose diuretic. Cont. To monitor     Diabetes Mellitus - continue current treatments - Appreciate  DTC input.      Cellulitis - on Ancef ID, improved     H/o Tobacco abuse - smoking cessation, nicotine patch     CHACORTA on CKD3 - stable, renal following - increase current dose of to Bumex 2 mg TID            Objective:   Vitals:  Blood pressure 94/53, pulse 83, temperature 98 °F (36.7 °C), resp. rate 12, height 5' 9\" (1.753 m), weight 167 lb 15.9 oz (76.2 kg), SpO2 95 %.   Temp (24hrs), Av.2 °F (36.8 °C), Min:98 °F (36.7 °C), Max:98.3 °F (36.8 °C)    Oxygen Therapy:  Oxygen Therapy  O2 Sat (%): 95 % (02/13/17 1000)  Pulse via Oximetry: 83 beats per minute (02/13/17 1000)  O2 Device: Nasal cannula (02/13/17 0800)  O2 Flow Rate (L/min): 1 l/min (02/13/17 0800)  ETCO2 (mmHg): 1 mmHg (02/07/17 0821)    EKG: sinus tach    Admission Weight: Last Weight   Weight: 178 lb 2.1 oz (80.8 kg) Weight: 167 lb 15.9 oz (76.2 kg)     Intake / Output / Drain:  Current Shift: 02/13 0701 - 02/13 1900  In: 83.8 [I.V.:83.8]  Out: 600 [Urine:600]  Last 24 hrs.: 02/11 1901 - 02/13 0700  In: 4496.5 [P.O.:2180; I.V.:2316.5]  Out: 8325 [Urine:8325]      EXAM:    HEENT:  EOMI       CVS:  S1/S2       LUNGS: soft bibasilar crackles                  ABD:  +BS, soft, NT/ND                  EXT:  Trace -b/l LE edema, + mild erythema                  Neuro:  No obvious deficits       Labs:   Lab Results   Component Value Date/Time    Sodium 134 02/19/2017 03:48 AM    Potassium 3.6 02/19/2017 03:48 AM    Chloride 95 02/19/2017 03:48 AM    CO2 26 02/19/2017 03:48 AM    Anion gap 13 02/19/2017 03:48 AM    Glucose 165 02/19/2017 03:48 AM    BUN 35 02/19/2017 03:48 AM    Creatinine 1.99 02/19/2017 03:48 AM    BUN/Creatinine ratio 18 02/19/2017 03:48 AM    GFR est AA 41 02/19/2017 03:48 AM    GFR est non-AA 34 02/19/2017 03:48 AM    Calcium 8.9 02/19/2017 03:48 AM     Lab Results   Component Value Date/Time    WBC 5.5 02/19/2017 03:48 AM    HGB 8.6 02/19/2017 03:48 AM    HCT 27.2 02/19/2017 03:48 AM    PLATELET 308 10/16/8590 03:48 AM    MCV 81.2 02/19/2017 03:48 AM         Signed By: ANGELA RollinsCNP-BC

## 2017-02-20 ENCOUNTER — APPOINTMENT (OUTPATIENT)
Dept: ULTRASOUND IMAGING | Age: 67
DRG: 215 | End: 2017-02-20
Attending: UROLOGY
Payer: SELF-PAY

## 2017-02-20 LAB
ALBUMIN SERPL BCP-MCNC: 3.1 G/DL (ref 3.5–5)
ALBUMIN/GLOB SERPL: 0.8 {RATIO} (ref 1.1–2.2)
ALP SERPL-CCNC: 105 U/L (ref 45–117)
ALT SERPL-CCNC: 21 U/L (ref 12–78)
ANION GAP BLD CALC-SCNC: 8 MMOL/L (ref 5–15)
AST SERPL W P-5'-P-CCNC: 18 U/L (ref 15–37)
BILIRUB SERPL-MCNC: 0.6 MG/DL (ref 0.2–1)
BUN SERPL-MCNC: 27 MG/DL (ref 6–20)
BUN/CREAT SERPL: 16 (ref 12–20)
CALCIUM SERPL-MCNC: 9 MG/DL (ref 8.5–10.1)
CHLORIDE SERPL-SCNC: 94 MMOL/L (ref 97–108)
CO2 SERPL-SCNC: 30 MMOL/L (ref 21–32)
CREAT SERPL-MCNC: 1.66 MG/DL (ref 0.7–1.3)
ERYTHROCYTE [DISTWIDTH] IN BLOOD BY AUTOMATED COUNT: 18.6 % (ref 11.5–14.5)
GLOBULIN SER CALC-MCNC: 4 G/DL (ref 2–4)
GLUCOSE BLD STRIP.AUTO-MCNC: 115 MG/DL (ref 65–100)
GLUCOSE BLD STRIP.AUTO-MCNC: 174 MG/DL (ref 65–100)
GLUCOSE BLD STRIP.AUTO-MCNC: 198 MG/DL (ref 65–100)
GLUCOSE BLD STRIP.AUTO-MCNC: 212 MG/DL (ref 65–100)
GLUCOSE SERPL-MCNC: 175 MG/DL (ref 65–100)
HCT VFR BLD AUTO: 27.8 % (ref 36.6–50.3)
HCV GENOTYPE: NORMAL
HCV RNA SERPL NAA+PROBE-ACNC: NORMAL IU/ML
HCV RNA SERPL NAA+PROBE-LOG IU: NORMAL LOG10 IU/ML
HGB BLD-MCNC: 8.8 G/DL (ref 12.1–17)
INR PPP: 1.3 (ref 0.9–1.1)
MAGNESIUM SERPL-MCNC: 2.1 MG/DL (ref 1.6–2.4)
MCH RBC QN AUTO: 25.4 PG (ref 26–34)
MCHC RBC AUTO-ENTMCNC: 31.7 G/DL (ref 30–36.5)
MCV RBC AUTO: 80.3 FL (ref 80–99)
PLATELET # BLD AUTO: 246 K/UL (ref 150–400)
POTASSIUM SERPL-SCNC: 3.5 MMOL/L (ref 3.5–5.1)
PROT SERPL-MCNC: 7.1 G/DL (ref 6.4–8.2)
PROTHROMBIN TIME: 12.9 SEC (ref 9–11.1)
RBC # BLD AUTO: 3.46 M/UL (ref 4.1–5.7)
SERVICE CMNT-IMP: ABNORMAL
SODIUM SERPL-SCNC: 132 MMOL/L (ref 136–145)
TEST INFORMATION, 550045: NORMAL
WBC # BLD AUTO: 5.5 K/UL (ref 4.1–11.1)

## 2017-02-20 PROCEDURE — 85027 COMPLETE CBC AUTOMATED: CPT | Performed by: PHYSICIAN ASSISTANT

## 2017-02-20 PROCEDURE — 74011250637 HC RX REV CODE- 250/637: Performed by: INTERNAL MEDICINE

## 2017-02-20 PROCEDURE — 82962 GLUCOSE BLOOD TEST: CPT

## 2017-02-20 PROCEDURE — 85610 PROTHROMBIN TIME: CPT | Performed by: PHYSICIAN ASSISTANT

## 2017-02-20 PROCEDURE — 51798 US URINE CAPACITY MEASURE: CPT

## 2017-02-20 PROCEDURE — 65660000000 HC RM CCU STEPDOWN

## 2017-02-20 PROCEDURE — 80053 COMPREHEN METABOLIC PANEL: CPT | Performed by: PHYSICIAN ASSISTANT

## 2017-02-20 PROCEDURE — 74011250636 HC RX REV CODE- 250/636: Performed by: INTERNAL MEDICINE

## 2017-02-20 PROCEDURE — 74011250637 HC RX REV CODE- 250/637: Performed by: THORACIC SURGERY (CARDIOTHORACIC VASCULAR SURGERY)

## 2017-02-20 PROCEDURE — 83735 ASSAY OF MAGNESIUM: CPT | Performed by: PHYSICIAN ASSISTANT

## 2017-02-20 PROCEDURE — 74011250637 HC RX REV CODE- 250/637: Performed by: NURSE PRACTITIONER

## 2017-02-20 PROCEDURE — 76040000019: Performed by: INTERNAL MEDICINE

## 2017-02-20 PROCEDURE — 74011250637 HC RX REV CODE- 250/637: Performed by: SPECIALIST

## 2017-02-20 PROCEDURE — 74011636637 HC RX REV CODE- 636/637: Performed by: PHYSICIAN ASSISTANT

## 2017-02-20 PROCEDURE — 36415 COLL VENOUS BLD VENIPUNCTURE: CPT | Performed by: PHYSICIAN ASSISTANT

## 2017-02-20 PROCEDURE — 77030035622: Performed by: INTERNAL MEDICINE

## 2017-02-20 RX ADMIN — MILRINONE LACTATE 0.38 MCG/KG/MIN: 200 INJECTION, SOLUTION INTRAVENOUS at 20:33

## 2017-02-20 RX ADMIN — Medication 20 ML: at 22:58

## 2017-02-20 RX ADMIN — INSULIN LISPRO 8 UNITS: 100 INJECTION, SUSPENSION SUBCUTANEOUS at 17:30

## 2017-02-20 RX ADMIN — INSULIN LISPRO 10 UNITS: 100 INJECTION, SUSPENSION SUBCUTANEOUS at 11:09

## 2017-02-20 RX ADMIN — CARVEDILOL 3.12 MG: 3.12 TABLET, FILM COATED ORAL at 11:13

## 2017-02-20 RX ADMIN — Medication 81 MG: at 11:13

## 2017-02-20 RX ADMIN — CLOPIDOGREL BISULFATE 75 MG: 75 TABLET, FILM COATED ORAL at 11:11

## 2017-02-20 RX ADMIN — SPIRONOLACTONE 25 MG: 25 TABLET, FILM COATED ORAL at 11:12

## 2017-02-20 RX ADMIN — Medication 400 MG: at 11:11

## 2017-02-20 RX ADMIN — CARVEDILOL 3.12 MG: 3.12 TABLET, FILM COATED ORAL at 17:31

## 2017-02-20 RX ADMIN — MILRINONE LACTATE 0.38 MCG/KG/MIN: 200 INJECTION, SOLUTION INTRAVENOUS at 08:47

## 2017-02-20 RX ADMIN — BUMETANIDE 2 MG: 1 TABLET ORAL at 17:30

## 2017-02-20 RX ADMIN — Medication 10 ML: at 16:19

## 2017-02-20 RX ADMIN — AMIODARONE HYDROCHLORIDE 200 MG: 200 TABLET ORAL at 11:15

## 2017-02-20 RX ADMIN — AMIODARONE HYDROCHLORIDE 200 MG: 200 TABLET ORAL at 22:57

## 2017-02-20 RX ADMIN — Medication 10 ML: at 07:13

## 2017-02-20 RX ADMIN — OXYCODONE HYDROCHLORIDE AND ACETAMINOPHEN 2 TABLET: 5; 325 TABLET ORAL at 19:11

## 2017-02-20 RX ADMIN — INSULIN LISPRO 1 UNITS: 100 INJECTION, SOLUTION INTRAVENOUS; SUBCUTANEOUS at 22:57

## 2017-02-20 RX ADMIN — GABAPENTIN 100 MG: 100 CAPSULE ORAL at 08:47

## 2017-02-20 RX ADMIN — GABAPENTIN 100 MG: 100 CAPSULE ORAL at 17:31

## 2017-02-20 RX ADMIN — ATORVASTATIN CALCIUM 10 MG: 10 TABLET, FILM COATED ORAL at 11:12

## 2017-02-20 RX ADMIN — BUMETANIDE 2 MG: 1 TABLET ORAL at 11:10

## 2017-02-20 RX ADMIN — OXYCODONE HYDROCHLORIDE AND ACETAMINOPHEN 2 TABLET: 5; 325 TABLET ORAL at 22:56

## 2017-02-20 RX ADMIN — OXYCODONE HYDROCHLORIDE AND ACETAMINOPHEN 2 TABLET: 5; 325 TABLET ORAL at 08:47

## 2017-02-20 NOTE — PROGRESS NOTES
[address]  [address]       GI PROGRESS NOTE  Dolores Nys, [facility]  [phone] office  [phone] NP in-hospital cell phone M-F until 4:30  After 5pm or on weekends, please call  for physician on call      NAME: Fernanda Mason. :  1950   MRN:  [id number]       Subjective:     Pt has no complaints. Hgb stable. Objective:     VITALS:   Last 24hrs VS reviewed since prior progress note. Most recent are:  Visit Vitals    /70 (BP 1 Location: Right arm, BP Patient Position: At rest)    Pulse 88    Temp 97.6 °F (36.4 °C)    Resp 16    Ht 5' 9\" (1.753 m)    Wt 76.4 kg (168 lb 6.9 oz)    SpO2 96%    BMI 24.87 kg/m2       PHYSICAL EXAM:  CONST:  Chronically-ill male in bed in no acute distress.  Pale  NEURO:  alert and oriented x 3  HEENT: EOMI, no scleral icterus  LUNGS: clear to ausculation, (-) wheeze  CARD:  regular rate and rhythm, S1 S2  ABD:  soft, no tenderness, no rebound, bowel sounds (+) all 4 quadrants, no masses, non distended  EXT:  no edema, warm  PSYCH: full, not anxious    Lab Data Reviewed:     Recent Results (from the past 24 hour(s))   GLUCOSE, POC    Collection Time: 17  4:36 PM   Result Value Ref Range    Glucose (POC) 164 (H) 65 - 100 mg/dL    Performed by Yolanda, POC    Collection Time: 17  9:54 PM   Result Value Ref Range    Glucose (POC) 153 (H) 65 - 100 mg/dL    Performed by [address] [place] [address], [facility]    Collection Time: 17  4:00 AM   Result Value Ref Range    Sodium 132 (L) 136 - 145 mmol/L    Potassium 3.5 3.5 - 5.1 mmol/L    Chloride 94 (L) 97 - 108 mmol/L    CO2 30 21 - 32 mmol/L    Anion gap 8 5 - 15 mmol/L    Glucose 175 (H) 65 - 100 mg/dL    BUN 27 (H) 6 - 20 MG/DL    Creatinine 1.66 (H) 0.70 - 1.30 MG/DL    BUN/Creatinine ratio 16 12 - 20      GFR est AA 51 (L) >60 ml/min/1.73m2    GFR est non-AA 42 (L) >60 ml/min/1.73m2    Calcium 9.0 8.5 - 10.1 MG/DL    Bilirubin, total 0.6 0.2 - 1.0 MG/DL    ALT (SGPT) 21 12 - 78 U/L    AST (SGOT) 18 15 - 37 U/L    Alk. phosphatase 105 45 - 117 U/L    Protein, total 7.1 6.4 - 8.2 g/dL    Albumin 3.1 (L) 3.5 - 5.0 g/dL    Globulin 4.0 2.0 - 4.0 g/dL    A-G Ratio 0.8 (L) 1.1 - 2.2     MAGNESIUM    Collection Time: 02/20/17  4:00 AM   Result Value Ref Range    Magnesium 2.1 1.6 - 2.4 mg/dL   CBC W/O DIFF    Collection Time: 02/20/17  4:00 AM   Result Value Ref Range    WBC 5.5 4.1 - 11.1 K/uL    RBC 3.46 (L) 4.10 - 5.70 M/uL    HGB 8.8 (L) 12.1 - 17.0 g/dL    HCT 27.8 (L) 36.6 - 50.3 %    MCV 80.3 80.0 - 99.0 FL    MCH 25.4 (L) 26.0 - 34.0 PG    MCHC 31.7 30.0 - 36.5 g/dL    RDW 18.6 (H) 11.5 - 14.5 %    PLATELET 731 577 - 117 K/uL   PROTHROMBIN TIME + INR    Collection Time: 02/20/17  4:00 AM   Result Value Ref Range    INR 1.3 (H) 0.9 - 1.1      Prothrombin time 12.9 (H) 9.0 - 11.1 sec   GLUCOSE, POC    Collection Time: 02/20/17  7:12 AM   Result Value Ref Range    Glucose (POC) 115 (H) 65 - 100 mg/dL    Performed by [address], POC    Collection Time: 02/20/17 12:07 PM   Result Value Ref Range    Glucose (POC) 198 (H) 65 - 100 mg/dL    Performed by Francisco Peralta        Assessment:   · Anemia: no obvious source of bleeding from EGD/CY     Patient Active Problem List   Diagnosis Code    Acute systolic (congestive) heart failure (HCC) I50.21    Bilateral lower extremity edema R60.0    Shortness of breath R06.02    Acute renal failure (ARF) ([facility] [address] N17.9    Hyperglycemia due to type 2 diabetes mellitus ([facility] [address] E11.65    Venous stasis dermatitis of both lower extremities I83.11, I83.12    Hypoxia R09.02    Pulmonary edema J81.1    Sinus tachycardia R00.0    Ischemic cardiomyopathy X55.4    Systolic heart failure (HCC) I50.20     Plan:   · Capsule endoscopy today; will read tomorrow  · Trend H/H  · Transfuse per primary team  · HCV quant pending  · Following     Signed By: Ajit Laughlin.  Leonarda Nicole NP     2/20/2017 12:34 PM         GI Attending: Agree with above. Will read M2A capsule endoscopy tomorrow. Will follow. Evangelist Rios MD

## 2017-02-20 NOTE — PROGRESS NOTES
5737 - in for VS and to assess patient, ENDO nurse Hussain Goodwin, RN) at bedside educating patient on capsule study, OK per Hussein Perry RN to give patient percocet and Neurontin as patient is c/o pain (6/10) in his feet. Informed must hold all other meds x2 hours. 3122 - Capsule study started by Hussein Perry (ENDO RN). [address], [place] & Dr. Ciara Sullivan at bedside, informed that must hold meds [id number]. 1108 - bladder scan completed - post void residual of 28 ml.  1126 - monitor tech reported a 11 beat run of v tach, patient asymptomatic, Dr. Jordyn Salinas at the bedside. 2035 - spoke with GREGORY Basurto, informed of 11 beat run.

## 2017-02-20 NOTE — ROUTINE PROCESS
Patient swallowed endoscopic capsule without difficulty. Pt / Nurse given written instructions regarding diet, activity, and end of exam time.

## 2017-02-20 NOTE — PROGRESS NOTES
Capsule data recording device removed. .  Patient denies any complaints at this time in regards to capsule procedure. Unit nurses notified of removal.  Device returned to unit.

## 2017-02-20 NOTE — CONSULTS
Urology Consult    Subjective:     Date of Consultation:  February 20, 2017    Referring Physician: Jessica Shelton    Reason for Consultation:  Renal failure    Patient Name: Prosper Ribeiro. MRN: [id number]    History of Present Illness:   Patient is a 61 y.o.  male who is being seen for renal failure. He was admitted to the hospital for CAD  Systolic heart failure ([facility] [address]  unknown  UNKNOWN  Anemia Blood in stool  Anemia. Past Medical History   Diagnosis Date    Cardiomyopathy ([address] 1/20/2017     A. Echo (1/19/17):  EF 5-10% with severe GHK,. Mildly dil LA. Mild TR. PASP 46.      Past Surgical History   Procedure Laterality Date    Colonoscopy N/A 2/17/2017     COLONOSCOPY performed by Leilani Schultz MD at [facility] ENDOSCOPY      History reviewed. No pertinent family history. Social History   Substance Use Topics    Smoking status: Not on file    Smokeless tobacco: Not on file    Alcohol use Not on file     Allergies   Allergen Reactions    Heparin (Porcine) Unknown (comments)      Prior to Admission medications    Not on File         Review of Systems:  A comprehensive review of systems was negative except for that written in the HPI.     Objective:     Data Review (Labs):    Recent Labs      02/20/17   0400  02/19/17   0348  02/18/17   0530  02/18/17   0352   WBC  5.5  5.5  6.5   --    HGB  8.8*  8.6*  8.5*   --    MCV  80.3  81.2  80.2   --    PLT  246  222  201   --    NA  132*  134*   --   134*   K  3.5  3.6   --   4.1   CREA  1.66*  1.99*   --   1.65*   BUN  27*  35*   --   32*   ALB  3.1*  2.8*   --   2.8*   TBILI  0.6  0.4   --   0.7   SGOT  18  16   --   26   ALT  21  20   --   23   AP  105  101   --   97   INR  1.3*  1.3*   --   1.3*       Patient Vitals for the past 8 hrs:   BP Temp Pulse Resp SpO2 Weight   02/20/17 0854 125/65 97.9 °F (36.6 °C) 91 18 95 % -   02/20/17 0847 125/65 97.9 °F (36.6 °C) 89 18 99 % -   02/20/17 0357 121/74 97.6 °F (36.4 °C) 91 18 99 % 76.4 kg (168 lb 6.9 oz) Temp (24hrs), Av.8 °F (36.6 °C), Min:97.6 °F (36.4 °C), Max:98.1 °F (36.7 °C)      Intake and Output:    1901 -  0700  In: 987.2 [P.O.:685; I.V.:302.2]  Out: 5300 [Urine:5300]    Physical Exam:            General:    alert, cooperative, no distress, appears stated age                     Skin:  Normal.   Lymph nodes:  Cervical, supraclavicular, and axillary nodes normal.             Abdomen[de-identified]  soft, non-tender. Bowel sounds normal. No masses,  no organomegaly             Genitalia:  defer exam. Pt refused to have prostate exam today. May agree later          Extremities:  negative       Assessment:     Active Problems:    Systolic heart failure ([address] (2017)          Has had rise in serum creat although better today. Pt reports slow urine stream .  Stable. Has never had prostate checked and refuses today. Review of CT shows full bladder and dilated upper tracts. Plan:     Bladder scan post void to check emptying. May be a candidate for alpha blocker therapy if ok from cardiol standpoint. Should get a RUPALI whenever he will agree.       Signed By: Thea Abbasi MD                         2017

## 2017-02-20 NOTE — PROGRESS NOTES
Problem: Falls - Risk of  Goal: *Absence of falls  Outcome: Progressing Towards Goal  Pt bedside table and call bell within reach. Pt aware to call for assistance as needed. Pt wearing gripper socks. Problem: Infection - Risk of, Central Venous Catheter-Associated Bloodstream Infection  Goal: *Absence of infection signs and symptoms  Outcome: Progressing Towards Goal  No s/sx of infection. Cathflow used in white and grey ports, pos blood return     0730: Bedside and Verbal shift change report given to Ginna (oncoming nurse) by [facility] (offgoing nurse). Report included the following information SBAR, Kardex, ED Summary, OR Summary, Intake/Output, MAR, Recent Results and Cardiac Rhythm NSR.

## 2017-02-20 NOTE — PROGRESS NOTES
[facility] Progress Note      NAME:  Fernanda Mason. :   1950   MRN:   [id number]   PCP:  None  CARD:   Dr. Miriam Reed    Date:  2017     Fernanda Mason. is a 61 y.o. male with a who presented for further evaluation of severe CAD and systolic heart failure. Subjective:   He was initially seen at [facility] 3 years ago and told that he had heart failure with LVEF 30%. He was lost to follow up due to lack of insurance and has not been seen by a physician in the interim. He complains of progressive fatigue, NAVARRO, PND, and edema over the past year, prompting presentation to [facility]. He also complained of lower extremity bullae, weeping, and pain. He denied palpitations, presyncope, syncope, or chest pain. Past 24 hours:  Currently having capsule study performed  No BM since colonoscopy  Denies dyspnea    Objective:     Visit Vitals    /64    Pulse 87    Temp 97.9 °F (36.6 °C)    Resp 16    Ht 5' 9\" (1.753 m)    Wt 76.4 kg (168 lb 6.9 oz)    SpO2 95%    BMI 24.87 kg/m2          General:  fatigued    HEENT: Normocephalic, EOMI, PERRLA, Hearing intact, trachea mid-line    Neck:  supple, no significant adenopathy, carotids upstroke normal bilaterally, no bruits    CVP:  5 cm      Heart:  Diminished PMI    Normal S1 and S2, S3 gallop    Murmur: 1/6 systolic murmur    Lungs: Diminished breath sounds left lower lung    Abdomen: soft, non-tender. Bowel sounds normal. +HSM, camera attached to abdomen    Extremity: No edema bilaterally    Neuro: Alert and oriented to person, place, and time; normal strength and tone. Normal symmetric reflexes.  Normal coordination and gait      1901 -  0700  In: 1013 [P.O.:685; I.V.:328]  Out: 5300 [Urine:5300]  O2 Flow Rate (L/min): 2 l/min O2 Device: Room air  Temp (24hrs), Av.8 °F (36.6 °C), Min:97.6 °F (36.4 °C), Max:97.9 °F (36.6 °C)          Care Plan discussed with:    Comments   Patient x    Family      RN x Care Manager                    Consultant:          Past History:     Past Medical History   Diagnosis Date    Cardiomyopathy ([address] 1/20/2017     A. Echo (1/19/17):  EF 5-10% with severe GHK,. Mildly dil LA. Mild TR. PASP 46.       Past Surgical History   Procedure Laterality Date    Colonoscopy N/A 2/17/2017     COLONOSCOPY performed by Cassie Robert MD at [facility] ENDOSCOPY       Social History   Substance Use Topics    Smoking status: Not on file    Smokeless tobacco: Not on file    Alcohol use Not on file        History reviewed. No pertinent family history. Allergies: Allergies   Allergen Reactions    Heparin (Porcine) Unknown (comments)          Data Review:     CXR:   CXR Results  (Last 48 hours)    None            Echocardiogram:   Echo Results  (Last 48 hours)    None          No results found for this visit on 01/20/17. ECG:  EKG: ST with occasional PVC, NSIVCD, LAE    LABS:  Recent Results (from the past 24 hour(s))   GLUCOSE, POC    Collection Time: 02/19/17  9:54 PM   Result Value Ref Range    Glucose (POC) 153 (H) 65 - 100 mg/dL    Performed by [address] [place] [address], [facility]    Collection Time: 02/20/17  4:00 AM   Result Value Ref Range    Sodium 132 (L) 136 - 145 mmol/L    Potassium 3.5 3.5 - 5.1 mmol/L    Chloride 94 (L) 97 - 108 mmol/L    CO2 30 21 - 32 mmol/L    Anion gap 8 5 - 15 mmol/L    Glucose 175 (H) 65 - 100 mg/dL    BUN 27 (H) 6 - 20 MG/DL    Creatinine 1.66 (H) 0.70 - 1.30 MG/DL    BUN/Creatinine ratio 16 12 - 20      GFR est AA 51 (L) >60 ml/min/1.73m2    GFR est non-AA 42 (L) >60 ml/min/1.73m2    Calcium 9.0 8.5 - 10.1 MG/DL    Bilirubin, total 0.6 0.2 - 1.0 MG/DL    ALT (SGPT) 21 12 - 78 U/L    AST (SGOT) 18 15 - 37 U/L    Alk.  phosphatase 105 45 - 117 U/L    Protein, total 7.1 6.4 - 8.2 g/dL    Albumin 3.1 (L) 3.5 - 5.0 g/dL    Globulin 4.0 2.0 - 4.0 g/dL    A-G Ratio 0.8 (L) 1.1 - 2.2     MAGNESIUM    Collection Time: 02/20/17  4:00 AM Result Value Ref Range    Magnesium 2.1 1.6 - 2.4 mg/dL   CBC W/O DIFF    Collection Time: 02/20/17  4:00 AM   Result Value Ref Range    WBC 5.5 4.1 - 11.1 K/uL    RBC 3.46 (L) 4.10 - 5.70 M/uL    HGB 8.8 (L) 12.1 - 17.0 g/dL    HCT 27.8 (L) 36.6 - 50.3 %    MCV 80.3 80.0 - 99.0 FL    MCH 25.4 (L) 26.0 - 34.0 PG    MCHC 31.7 30.0 - 36.5 g/dL    RDW 18.6 (H) 11.5 - 14.5 %    PLATELET 511 989 - 887 K/uL   PROTHROMBIN TIME + INR    Collection Time: 02/20/17  4:00 AM   Result Value Ref Range    INR 1.3 (H) 0.9 - 1.1      Prothrombin time 12.9 (H) 9.0 - 11.1 sec   GLUCOSE, POC    Collection Time: 02/20/17  7:12 AM   Result Value Ref Range    Glucose (POC) 115 (H) 65 - 100 mg/dL    Performed by [address], [facility]    Collection Time: 02/20/17 12:07 PM   Result Value Ref Range    Glucose (POC) 198 (H) 65 - 100 mg/dL    Performed by Joyce Benitez        Results for Chico Brewer (MRN [id number]) as of 2/20/2017 16:43   Ref. Range 2/14/2017 16:29   Hepatitis A, IgM Latest Ref Range: NR   NONREACTIVE   Hepatitis B surface Ag Latest Units: Index 0.25   Hep B surface Ag Interp. Latest Ref Range: NEG   NEGATIVE   Hepatitis B core, IgM Latest Ref Range: NR   NONREACTIVE   Hepatitis C virus Ab Latest Units: Index >11.90   Hep C  virus Ab Interp. Latest Ref Range: NR   REACTIVE (A)   Hep C  virus Ab comment Latest Units:   Method used is Si. ..        All Micro Results     Procedure Component Value Units Date/Time    CULTURE, BLOOD, PAIRED [[id number]] Collected:  02/05/17 0322    Order Status:  Completed Specimen:  Blood Updated:  02/10/17 0542     Special Requests: NO SPECIAL REQUESTS        Culture result: NO GROWTH 5 DAYS       CULTURE, BODY FLUID Caitlin Dillon [[id number]] Collected:  01/26/17 1505    Order Status:  Completed Specimen:  Thoracentesis Updated:  01/30/17 1057     Special Requests: NO SPECIAL REQUESTS        GRAM STAIN OCCASIONAL  WBCS SEEN         NO ORGANISMS SEEN        Culture result: NO GROWTH 4 DAYS       CULTURE, Scottie Cake STAIN [[id number]] Collected:  01/25/17 2134    Order Status:  Completed Specimen:  Leg Updated:  01/27/17 1440     Special Requests: NO SPECIAL REQUESTS        GRAM STAIN RARE  WBCS SEEN         NO ORGANISMS SEEN        Culture result: LIGHT  STREPTOCOCCI, BETA HEMOLYTIC GROUP C  . .. Penicillin and ampicillin are drugs of choice for treatment of beta-hemolytic streptococcal infections. Susceptibility testing of penicillins and beta-lactams approved by the FDA for treatment of beta-hemolytic streptococcal infections need not be performed routinely, because nonsusceptible isolates are extremely rare. CLSI 2012         MODERATE  BETTIE TROPICALIS           Abdominal Ultrasound 2/4/17  IMPRESSION: Distended gallbladder with gallbladder wall thickening,  pericholecystic fluid and tenderness to probe palpation during exam. Findings  are consistent with acalculus cholecystitis. Thoracentesis 1/26/17  Specimen Source   1: Pleural Fluid   CYTOLOGIC INTERPRETATION:   Scattered mesothelial cells with degenerative changes and mixed inflammatory cells   General Categorization   No cells diagnostic for malignancy   Specimen Adequacy   Satisfactory for evaluation       Cardiac MRI 1/31/17  IMPRESSION  1. Markedly dilated left ventricle with 3-D end-diastolic volume index to body  surface area of 153 mL/sq m. Mild eccentric left ventricular hypertrophy with  3-D mass index to body surface area of 85 g/sq m. Severe left ventricular  systolic dysfunction. Severe global hypokinesis with regional variation. 3-D  LVEF 10%. 2. Moderately dilated right ventricle by 3-D volumetric assessment. RV end  diastolic volume indexed to body surface area of 138 mL/sq m. Moderate to severe  right ventricular systolic dysfunction. Global hypokinesis. 3-D RVEF 25%. 3. Apical a tethered mitral valve leaflets. Mild mitral regurgitation. 4. Moderately severe 3+ tricuspid regurgitation. 5.  On LGE study, the anterior wall, anteroseptal wall, anteroapical wall, and  anterior lateral wall are largely viable without significant myocardial  infarction. The entire inferior wall and inferoseptal wall are completely viable  without any infarct. The base to mid inferolateral wall demonstrate a near  transmural greater than 75% thickness infarct with minimal or no viable  myocardium in this territory. The distal inferolateral wall, apical lateral wall  does not demonstrate any infarct and are largely viable. Based on the viability  imaging, the entire LAD territory is largely viable and should recover upon  revascularization. The entire RCA territory is largely viable and should recover  upon revascularization. The LCx territory demonstrate medium-size infarct with  limited viability. 6. Large left-sided pleural effusion with passive atelectasis of the left lung. 7. Dilated branch pulmonary arteries suggest pulmonary hypertension. 8. Markedly dilated left atrium measuring 59 x 55 mm. Moderately dilated right  atrium measuring 46 x 56 mm.     Medications reviewed:    Current Facility-Administered Medications   Medication Dose Route Frequency    bumetanide (BUMEX) tablet 2 mg  2 mg Oral BID    alteplase (CATHFLO) 1 mg in sterile water (preservative free) 1 mL injection  1 mg InterCATHeter PRN    sodium chloride (NS) flush 5-10 mL  5-10 mL InterCATHeter Q8H    insulin lispro protamine/insulin lispro (HUMALOG MIX 75/25) injection 10 Units  10 Units SubCUTAneous ACB    insulin lispro protamine/insulin lispro (HUMALOG MIX 75/25) injection 8 Units  8 Units SubCUTAneous ACD    glucose chewable tablet 16 g  4 Tab Oral PRN    dextrose (D50W) injection syrg 12.5-25 g  12.5-25 g IntraVENous PRN    glucagon (GLUCAGEN) injection 1 mg  1 mg IntraMUSCular PRN    insulin lispro (HUMALOG) injection   SubCUTAneous AC&HS    oxyCODONE-acetaminophen (PERCOCET) 5-325 mg per tablet 2 Tab  2 Tab Oral Q4H PRN    traMADol (ULTRAM) tablet  mg   mg Oral Q4H PRN    milrinone (PRIMACOR) 20 MG/100 ML D5W infusion  0.375 mcg/kg/min IntraVENous CONTINUOUS    nicotine (NICODERM CQ) 14 mg/24 hr patch 1 Patch  1 Patch TransDERmal DAILY    spironolactone (ALDACTONE) tablet 25 mg  25 mg Oral DAILY    bisacodyl (DULCOLAX) suppository 10 mg  10 mg Rectal DAILY PRN    magnesium hydroxide (MILK OF MAGNESIA) 400 mg/5 mL oral suspension 30 mL  30 mL Oral DAILY PRN    clopidogrel (PLAVIX) tablet 75 mg  75 mg Oral DAILY    docusate sodium (COLACE) capsule 100 mg  100 mg Oral DAILY PRN    gabapentin (NEURONTIN) capsule 100 mg  100 mg Oral BID    carvedilol (COREG) tablet 3.125 mg  3.125 mg Oral BID WITH MEALS    amiodarone (CORDARONE) tablet 200 mg  200 mg Oral Q12H    atorvastatin (LIPITOR) tablet 10 mg  10 mg Oral DAILY    acetaminophen (TYLENOL) tablet 650 mg  650 mg Oral Q6H PRN    aspirin delayed-release tablet 81 mg  81 mg Oral DAILY    magnesium oxide (MAG-OX) tablet 400 mg  400 mg Oral DAILY    diphenhydrAMINE (BENADRYL) capsule 25 mg  25 mg Oral QHS PRN    ELECTROLYTE REPLACEMENT PROTOCOL  1 Each Other PRN     HIDA Scan 2/7/17  Gallbladder emptying study was performed per protocol utilizing 5. 2mCi Tc-99 M  Choletec and 1.86 mcg CCK intravenously. There is normal tracer distribution  throughout the liver. Activity is noted in the gallbladder, common bile duct,  and small bowel. The gallbladder ejection fraction is 31% (normal greater than  or equal to 35%).      IMPRESSION: Abnormal gallbladder emptying study with an EF of only 31%.      Colonsocopy (2/17/17) - Dr. Keli Mares  Rectum: small internal hemorrhoids  Sigmoid: mild diverticulosis  Descending Colon: normal  Transverse Colon: normal  Ascending Colon: single, sessile 3-4 mm polyp in distal ascending colon - not removed in setting of Plavix  Cecum: non-bleeding AVM 2-3 mm in size - not ablated in setting of Plavix  Terminal Ileum: not intubated         IMPRESSION / PLAN:    1. CAD - severe native vessel disease, s/p PCI for  of RCA and left main with Impella support   Stable on ASA, clopidogrel, BB, and statin    2. Acute on chronic systolic heart failure (ICM with LVEF 5-10%) - Stage D, NYHA Class IV   Currently inotropic dependent, repeat echo with improved LVEF 20-24%   On IV milrinone 0.375, bumex 2 mg bid, and spironolactone 25 mg daily    3. Paroxysmal atrial fibrillation   Hold on warfarin due to gi bleeding   Maintaining SR on amiodarone    4. DM2 with Hga1c 13.5   On lantus, SSI, and glimepiride   Diabetic education   Accuchecks    5. Cellutitis   Resolved after treatment with cefazolin    6. CKD3   Serum creatinine improving on inotropic support and Impella   Continue diuresis    7. COPD with FEV1 51% predicted    8. Acalculous Cholecystitis   Completed course of IV zosyn     9. Hyponatremia   Improved with diuresis    10. Nicotine Addiction   Smoking cessation counseling   Nicotine patch - reduce dose to 14 mg/24 hour    11. CECILIA    Hgb 8.8, plt 246   No heparin    12. Anemia due to gi bleeding   Await capsule study    13. Hepatitis C   Discussed hepatitis screening results with patient   Await PCR of hepatitis C to assess viral load   Will refer to [facility] for further therapy as outpatient    Dorie Martin MD, [facility] [place] [id number] Director     [facility]  [address] [facility], [address] [place] [address], [address] [place] [address]  Office [phone]  Fax [phone]  24 hour VAD/HF Pager: [phone]

## 2017-02-20 NOTE — PROGRESS NOTES
[facility]   [address] [address], [facility] [address], [facility]  Phone: [phone]   [phone]       Nephrology Progress Note  Gabriella Hauser.     1950     [id number]  Date of Admission : 1/20/2017 02/20/17    CC: Follow up for CHACORTA on CKD    Assessment and Plan   CHACORTA on CKD:  - Cr stable, appears to be at baseline  - cont current diuretics  - labs in AM    Hypokalemia:  - K stable  - replete as needed    Hyponatremia :  - combination of  CHF + SIADH from chronic alcoholism  - Na stable at 132    Acute on Chronic Systolic CHF w/ severe CMP  - echo with EF 5-10%, severely dilated LV, severe TR  - Multivessel CAD : MRI showed viable myocardium   - s/p LAD and RCA stents 2/9 with Impella support  - on milrinone now    Anemia:  - transfused 1 unit on 2/15  - EGD/Colon with no obvious bleeding - polyp in colon that was not removed  - capsule study underway  - transfusion per CTS     Interval History:  Seen and examined. Cr stable today. Mild bump over the weekend. No cp or sob. For capsule study today. Review of Systems: Pertinent items are noted in HPI.     Current Medications:   Current Facility-Administered Medications   Medication Dose Route Frequency    bumetanide (BUMEX) tablet 2 mg  2 mg Oral BID    alteplase (CATHFLO) 1 mg in sterile water (preservative free) 1 mL injection  1 mg InterCATHeter PRN    sodium chloride (NS) flush 5-10 mL  5-10 mL InterCATHeter Q8H    insulin lispro protamine/insulin lispro (HUMALOG MIX 75/25) injection 10 Units  10 Units SubCUTAneous ACB    insulin lispro protamine/insulin lispro (HUMALOG MIX 75/25) injection 8 Units  8 Units SubCUTAneous ACD    glucose chewable tablet 16 g  4 Tab Oral PRN    dextrose (D50W) injection syrg 12.5-25 g  12.5-25 g IntraVENous PRN    glucagon (GLUCAGEN) injection 1 mg  1 mg IntraMUSCular PRN    insulin lispro (HUMALOG) injection   SubCUTAneous AC&HS    oxyCODONE-acetaminophen (PERCOCET) 5-325 mg per tablet 2 Tab  2 Tab Oral Q4H PRN    traMADol (ULTRAM) tablet  mg   mg Oral Q4H PRN    milrinone (PRIMACOR) 20 MG/100 ML D5W infusion  0.375 mcg/kg/min IntraVENous CONTINUOUS    nicotine (NICODERM CQ) 14 mg/24 hr patch 1 Patch  1 Patch TransDERmal DAILY    spironolactone (ALDACTONE) tablet 25 mg  25 mg Oral DAILY    bisacodyl (DULCOLAX) suppository 10 mg  10 mg Rectal DAILY PRN    magnesium hydroxide (MILK OF MAGNESIA) 400 mg/5 mL oral suspension 30 mL  30 mL Oral DAILY PRN    clopidogrel (PLAVIX) tablet 75 mg  75 mg Oral DAILY    docusate sodium (COLACE) capsule 100 mg  100 mg Oral DAILY PRN    gabapentin (NEURONTIN) capsule 100 mg  100 mg Oral BID    carvedilol (COREG) tablet 3.125 mg  3.125 mg Oral BID WITH MEALS    amiodarone (CORDARONE) tablet 200 mg  200 mg Oral Q12H    atorvastatin (LIPITOR) tablet 10 mg  10 mg Oral DAILY    acetaminophen (TYLENOL) tablet 650 mg  650 mg Oral Q6H PRN    aspirin delayed-release tablet 81 mg  81 mg Oral DAILY    magnesium oxide (MAG-OX) tablet 400 mg  400 mg Oral DAILY    diphenhydrAMINE (BENADRYL) capsule 25 mg  25 mg Oral QHS PRN    ELECTROLYTE REPLACEMENT PROTOCOL  1 Each Other PRN      Allergies   Allergen Reactions    Heparin (Porcine) Unknown (comments)       Objective:  Vitals:    Vitals:    02/20/17 0357 02/20/17 0847 02/20/17 0854 02/20/17 1110   BP: 121/74 125/65 125/65 110/70   Pulse: 91 89 91 88   Resp: 18 18 18 16   Temp: 97.6 °F (36.4 °C) 97.9 °F (36.6 °C) 97.9 °F (36.6 °C) 97.6 °F (36.4 °C)   SpO2: 99% 99% 95% 96%   Weight: 76.4 kg (168 lb 6.9 oz)      Height:         Intake and Output:  02/20 0701 - 02/20 1900  In: 535.6 [P.O.:500;  I.V.:35.6]  Out: 950 [Urine:950]  02/18 1901 - 02/20 0700  In: 1013 [P.O.:685; I.V.:328]  Out: 5300 [Urine:5300]    Physical Examination:  General: In NAD  Resp:  Lungs CTA  CV:  RRR,  no murmur or rub,+ LE edema  GI:  Soft, NT, + Bowel sounds,  Neurologic:  Non focal  Skin:  RLE cellulitis - resolving Ext                   Edema +,      []    High complexity decision making was performed  []    Patient is at high-risk of decompensation with multiple organ involvement    Lab Data Personally Reviewed: I have reviewed all the pertinent labs, microbiology data and radiology studies during assessment. Recent Labs      02/20/17 0400 02/19/17 0348 02/18/17   0352   NA  132*  134*  134*   K  3.5  3.6  4.1   CL  94*  95*  94*   CO2  30  26  27   GLU  175*  165*  122*   BUN  27*  35*  32*   CREA  1.66*  1.99*  1.65*   CA  9.0  8.9  8.4*   MG  2.1  2.2  2.2   ALB  3.1*  2.8*  2.8*   SGOT  18  16  26   ALT  21  20  23   INR  1.3*  1.3*  1.3*     Recent Labs      02/20/17   0400 02/19/17   0348 02/18/17   0530   WBC  5.5  5.5  6.5   HGB  8.8*  8.6*  8.5*   HCT  27.8*  27.2*  26.8*   PLT  246  222  201     No results found for: SDES  Lab Results   Component Value Date/Time    Culture result: NO GROWTH 5 DAYS 02/05/2017 03:22 AM    Culture result: NO GROWTH 4 DAYS 01/26/2017 03:05 PM    Culture result:  01/25/2017 09:34 PM     LIGHT  STREPTOCOCCI, BETA HEMOLYTIC GROUP C  . .. Penicillin and ampicillin are drugs of choice for treatment of beta-hemolytic streptococcal infections. Susceptibility testing of penicillins and beta-lactams approved by the FDA for treatment of beta-hemolytic streptococcal infections need not be performed routinely, because nonsusceptible isolates are extremely rare.  CLSI 2012      Culture result: MODERATE  CANDIDA TROPICALIS   01/25/2017 09:34 PM    Culture result: NO SIGNIFICANT GROWTH 01/19/2017 01:35 AM     Recent Results (from the past 24 hour(s))   GLUCOSE, POC    Collection Time: 02/19/17 11:55 AM   Result Value Ref Range    Glucose (POC) 72 65 - 100 mg/dL    Performed by Yolanda POC    Collection Time: 02/19/17 12:18 PM   Result Value Ref Range    Glucose (POC) 82 65 - 100 mg/dL    Performed by Yolanda, POC    Collection Time: 02/19/17  4:36 PM   Result Value Ref Range    Glucose (POC) 164 (H) 65 - 100 mg/dL    Performed by MANNY Guerrero    Collection Time: 02/19/17  9:54 PM   Result Value Ref Range    Glucose (POC) 153 (H) 65 - 100 mg/dL    Performed by FERN Mahajan    Collection Time: 02/20/17  4:00 AM   Result Value Ref Range    Sodium 132 (L) 136 - 145 mmol/L    Potassium 3.5 3.5 - 5.1 mmol/L    Chloride 94 (L) 97 - 108 mmol/L    CO2 30 21 - 32 mmol/L    Anion gap 8 5 - 15 mmol/L    Glucose 175 (H) 65 - 100 mg/dL    BUN 27 (H) 6 - 20 MG/DL    Creatinine 1.66 (H) 0.70 - 1.30 MG/DL    BUN/Creatinine ratio 16 12 - 20      GFR est AA 51 (L) >60 ml/min/1.73m2    GFR est non-AA 42 (L) >60 ml/min/1.73m2    Calcium 9.0 8.5 - 10.1 MG/DL    Bilirubin, total 0.6 0.2 - 1.0 MG/DL    ALT (SGPT) 21 12 - 78 U/L    AST (SGOT) 18 15 - 37 U/L    Alk. phosphatase 105 45 - 117 U/L    Protein, total 7.1 6.4 - 8.2 g/dL    Albumin 3.1 (L) 3.5 - 5.0 g/dL    Globulin 4.0 2.0 - 4.0 g/dL    A-G Ratio 0.8 (L) 1.1 - 2.2     MAGNESIUM    Collection Time: 02/20/17  4:00 AM   Result Value Ref Range    Magnesium 2.1 1.6 - 2.4 mg/dL   CBC W/O DIFF    Collection Time: 02/20/17  4:00 AM   Result Value Ref Range    WBC 5.5 4.1 - 11.1 K/uL    RBC 3.46 (L) 4.10 - 5.70 M/uL    HGB 8.8 (L) 12.1 - 17.0 g/dL    HCT 27.8 (L) 36.6 - 50.3 %    MCV 80.3 80.0 - 99.0 FL    MCH 25.4 (L) 26.0 - 34.0 PG    MCHC 31.7 30.0 - 36.5 g/dL    RDW 18.6 (H) 11.5 - 14.5 %    PLATELET 206 520 - 978 K/uL   PROTHROMBIN TIME + INR    Collection Time: 02/20/17  4:00 AM   Result Value Ref Range    INR 1.3 (H) 0.9 - 1.1      Prothrombin time 12.9 (H) 9.0 - 11.1 sec   GLUCOSE, POC    Collection Time: 02/20/17  7:12 AM   Result Value Ref Range    Glucose (POC) 115 (H) 65 - 100 mg/dL    Performed by Andrea Shafer            I have reviewed the flowsheets. Chart and Pertinent Notes have been reviewed.    No change in PMH ,family and social history from Consult note.      Rachelle Young MD

## 2017-02-20 NOTE — DIABETES MGMT
DTC Progress Note    Recommendations/ Comments: Chart reviewed for hypoglycemia yesterday. It appears patient may have had food prior to blood sugar check and blood sugar improved today, no changes suggested at this time. Chart reviewed on Fernanda Aviles. .    Patient is a 61 y.o. male with newly diagnosed Type 2 Diabetes. A1c:   Lab Results   Component Value Date/Time    Hemoglobin A1c 13.5 01/19/2017 05:05 PM    Hemoglobin A1c 13.4 01/18/2017 11:11 PM       Recent Glucose Results:   Lab Results   Component Value Date/Time     (H) 02/20/2017 04:00 AM    GLUCPOC 198 (H) 02/20/2017 12:07 PM    GLUCPOC 115 (H) 02/20/2017 07:12 AM    GLUCPOC 153 (H) 02/19/2017 09:54 PM        Lab Results   Component Value Date/Time    Creatinine 1.66 02/20/2017 04:00 AM       Active Orders   Diet    DIET CARDIAC Regular; 2 GM NA (House Low NA)        PO intake:   Patient Vitals for the past 72 hrs:   % Diet Eaten   02/20/17 0847 0 %   02/19/17 0749 100 %   02/18/17 1624 100 %   02/17/17 1638 100 %       Current hospital DM medication:Humalog 75/25 10 units with breakfast, 8 units with dinner Humalog for correction, high sensitivity    Will continue to follow as needed.     Thank you  Juan Arnett, MS, RN, CDE

## 2017-02-20 NOTE — PROGRESS NOTES
Advanced Heart Failure Progress Note      Admit Date: 1/20/2017       Procedures:  SVO2 swan placed via RFV  Impella placed via LFA  PCI performed via RFA     Successful MIKE pRCA: 2.5x38 Xience + 2.75x12 Xience overlapping. Successful MIKE ost left main: 4.0x12 Xience post-dil with 4.5x12 NC. By Dr. Venessa Chandler    Removal percutaneous ventricular   assist device (Impella pump) at separate and distinct session from   insertion (CPT CODE 47599) on 2/14/17 by Dr. Angelito Ramirez        Subjective:     Pt resting in bed - capsule study in progress. No melena or BRBPR. Pt denies SOB or CP. No BM since last week. Negative 3 Liters, weight down 3 lbs. Remains on Milrinone . 375 mcg/kg/min    IMPRESSION/Plan:   s/p PCI w/ Impella support - On ASA and Plavix. Cont. low dose Coreg,  No ACEi d/t renal dysfunction. Continue statin. Acute on chronic systolic heart failure (ICM) - Stage D, NYHA Class IV - continue Milrinone   0.375 mcg/kg/min - pt is inotrope dependent and will require milrinone as outpatient. Trying to arrange. He will also need a Life Vest.  Cont. Bumex to 2 mg BID and low dose Coreg. No ACEi d/t renal insufficiency. Daily weights. Strict I/Os. Trend labs. [facility] LCSW working on home inotropes and life vest.     Chronic Anemia - Hg stable, capsule study in progress, GI following - appreciate input. +Hep C Ab - further studies pending, consult to Dr. Heike Lott    Acalculous Cholecystitis - resolved treated w/ Zosyn     L pleural effusion - improved on last CXR. H/O PAF - no recent AF - no anticoagulation d/t anemia and possible GI bleeding. CECILIA - REJI positive, platelets good - close f/u NO HEPARIN     Hyponatremia - slightly decreased, cont. Diuretics, trend     Diabetes Mellitus - continue current treatments - Appreciate  DTC input.      Cellulitis - on Ancef per ID, improved     H/o Tobacco abuse - smoking cessation, nicotine patch.     CHACORTA on CKD3 - stable, renal following, cont.  Bumex and current dose     Weak urinary stream - urology consult - pt refused RUPALI to assess prostate.   Will check post-void residual.               Objective:   Vitals:  Patient Vitals for the past 12 hrs:   Temp Pulse Resp BP SpO2   02/20/17 1110 97.6 °F (36.4 °C) 88 16 110/70 96 %   02/20/17 0854 97.9 °F (36.6 °C) 91 18 125/65 95 %   02/20/17 0847 97.9 °F (36.6 °C) 89 18 125/65 99 %   02/20/17 0357 97.6 °F (36.4 °C) 91 18 121/74 99 %     EKG: sinus tach    Admission Weight: Last Weight   Weight: 178 lb 2.1 oz (80.8 kg) Weight: 167 lb 15.9 oz (76.2 kg)     Intake / Output / Drain:    Intake/Output Summary (Last 24 hours) at 02/20/17 1540  Last data filed at 02/20/17 1419   Gross per 24 hour   Intake          1019.32 ml   Output             3725 ml   Net         -2705.68 ml         EXAM:    HEENT:  Anicteric, EOMI       CVS:  S1/S2        LUNGS: bibasilar crackles, slightly decreased L base                  ABD:  +BS, NT/ND                  EXT:  Trace b/l LE edema, b/l TEDs in place                  Neuro:  No obvious deficits    Labs:   Lab Results   Component Value Date/Time    Sodium 132 02/20/2017 04:00 AM    Potassium 3.5 02/20/2017 04:00 AM    Chloride 94 02/20/2017 04:00 AM    CO2 30 02/20/2017 04:00 AM    Anion gap 8 02/20/2017 04:00 AM    Glucose 175 02/20/2017 04:00 AM    BUN 27 02/20/2017 04:00 AM    Creatinine 1.66 02/20/2017 04:00 AM    BUN/Creatinine ratio 16 02/20/2017 04:00 AM    GFR est AA 51 02/20/2017 04:00 AM    GFR est non-AA 42 02/20/2017 04:00 AM    Calcium 9.0 02/20/2017 04:00 AM     Lab Results   Component Value Date/Time    WBC 5.5 02/20/2017 04:00 AM    HGB 8.8 02/20/2017 04:00 AM    HCT 27.8 02/20/2017 04:00 AM    PLATELET 191 64/93/3659 04:00 AM    MCV 80.3 02/20/2017 04:00 AM     Pt seen with Dr. Morena Sky By: Jane Ruiz, [facility]    Saw patient, agree with above  Risk of morbidity and mortality - high  Medical decision making - high complexity

## 2017-02-21 ENCOUNTER — HOME HEALTH ADMISSION (OUTPATIENT)
Dept: HOME HEALTH SERVICES | Facility: HOME HEALTH | Age: 67
End: 2017-02-21

## 2017-02-21 LAB
ALBUMIN SERPL BCP-MCNC: 2.9 G/DL (ref 3.5–5)
ALBUMIN/GLOB SERPL: 0.7 {RATIO} (ref 1.1–2.2)
ALP SERPL-CCNC: 101 U/L (ref 45–117)
ALT SERPL-CCNC: 20 U/L (ref 12–78)
ANION GAP BLD CALC-SCNC: 11 MMOL/L (ref 5–15)
AST SERPL W P-5'-P-CCNC: 16 U/L (ref 15–37)
BILIRUB SERPL-MCNC: 0.5 MG/DL (ref 0.2–1)
BUN SERPL-MCNC: 29 MG/DL (ref 6–20)
BUN/CREAT SERPL: 16 (ref 12–20)
CALCIUM SERPL-MCNC: 8.6 MG/DL (ref 8.5–10.1)
CHLORIDE SERPL-SCNC: 96 MMOL/L (ref 97–108)
CO2 SERPL-SCNC: 26 MMOL/L (ref 21–32)
CREAT SERPL-MCNC: 1.82 MG/DL (ref 0.7–1.3)
ERYTHROCYTE [DISTWIDTH] IN BLOOD BY AUTOMATED COUNT: 18.2 % (ref 11.5–14.5)
GLOBULIN SER CALC-MCNC: 4.2 G/DL (ref 2–4)
GLUCOSE BLD STRIP.AUTO-MCNC: 140 MG/DL (ref 65–100)
GLUCOSE BLD STRIP.AUTO-MCNC: 145 MG/DL (ref 65–100)
GLUCOSE BLD STRIP.AUTO-MCNC: 176 MG/DL (ref 65–100)
GLUCOSE BLD STRIP.AUTO-MCNC: 224 MG/DL (ref 65–100)
GLUCOSE SERPL-MCNC: 132 MG/DL (ref 65–100)
HCT VFR BLD AUTO: 27.4 % (ref 36.6–50.3)
HGB BLD-MCNC: 8.6 G/DL (ref 12.1–17)
INR PPP: 1.3 (ref 0.9–1.1)
MAGNESIUM SERPL-MCNC: 2.2 MG/DL (ref 1.6–2.4)
MCH RBC QN AUTO: 25.3 PG (ref 26–34)
MCHC RBC AUTO-ENTMCNC: 31.4 G/DL (ref 30–36.5)
MCV RBC AUTO: 80.6 FL (ref 80–99)
PLATELET # BLD AUTO: 262 K/UL (ref 150–400)
POTASSIUM SERPL-SCNC: 3.9 MMOL/L (ref 3.5–5.1)
PROT SERPL-MCNC: 7.1 G/DL (ref 6.4–8.2)
PROTHROMBIN TIME: 13.3 SEC (ref 9–11.1)
RBC # BLD AUTO: 3.4 M/UL (ref 4.1–5.7)
SERVICE CMNT-IMP: ABNORMAL
SODIUM SERPL-SCNC: 133 MMOL/L (ref 136–145)
WBC # BLD AUTO: 5.5 K/UL (ref 4.1–11.1)

## 2017-02-21 PROCEDURE — 74011250637 HC RX REV CODE- 250/637: Performed by: INTERNAL MEDICINE

## 2017-02-21 PROCEDURE — 74011250637 HC RX REV CODE- 250/637: Performed by: NURSE PRACTITIONER

## 2017-02-21 PROCEDURE — 74011250637 HC RX REV CODE- 250/637: Performed by: THORACIC SURGERY (CARDIOTHORACIC VASCULAR SURGERY)

## 2017-02-21 PROCEDURE — 74011636637 HC RX REV CODE- 636/637: Performed by: PHYSICIAN ASSISTANT

## 2017-02-21 PROCEDURE — 0DJ07ZZ INSPECTION OF UPPER INTESTINAL TRACT, VIA NATURAL OR ARTIFICIAL OPENING: ICD-10-PCS | Performed by: INTERNAL MEDICINE

## 2017-02-21 PROCEDURE — 36415 COLL VENOUS BLD VENIPUNCTURE: CPT | Performed by: PHYSICIAN ASSISTANT

## 2017-02-21 PROCEDURE — 74011250636 HC RX REV CODE- 250/636: Performed by: INTERNAL MEDICINE

## 2017-02-21 PROCEDURE — 82962 GLUCOSE BLOOD TEST: CPT

## 2017-02-21 PROCEDURE — 85027 COMPLETE CBC AUTOMATED: CPT | Performed by: PHYSICIAN ASSISTANT

## 2017-02-21 PROCEDURE — 85610 PROTHROMBIN TIME: CPT | Performed by: PHYSICIAN ASSISTANT

## 2017-02-21 PROCEDURE — 80053 COMPREHEN METABOLIC PANEL: CPT | Performed by: PHYSICIAN ASSISTANT

## 2017-02-21 PROCEDURE — 74011250637 HC RX REV CODE- 250/637: Performed by: SPECIALIST

## 2017-02-21 PROCEDURE — 65660000000 HC RM CCU STEPDOWN

## 2017-02-21 PROCEDURE — 83735 ASSAY OF MAGNESIUM: CPT | Performed by: PHYSICIAN ASSISTANT

## 2017-02-21 RX ADMIN — OXYCODONE HYDROCHLORIDE AND ACETAMINOPHEN 2 TABLET: 5; 325 TABLET ORAL at 03:07

## 2017-02-21 RX ADMIN — Medication 400 MG: at 09:05

## 2017-02-21 RX ADMIN — CLOPIDOGREL BISULFATE 75 MG: 75 TABLET, FILM COATED ORAL at 09:05

## 2017-02-21 RX ADMIN — OXYCODONE HYDROCHLORIDE AND ACETAMINOPHEN 2 TABLET: 5; 325 TABLET ORAL at 14:48

## 2017-02-21 RX ADMIN — MILRINONE LACTATE 0.38 MCG/KG/MIN: 200 INJECTION, SOLUTION INTRAVENOUS at 07:28

## 2017-02-21 RX ADMIN — SPIRONOLACTONE 25 MG: 25 TABLET, FILM COATED ORAL at 09:05

## 2017-02-21 RX ADMIN — CARVEDILOL 3.12 MG: 3.12 TABLET, FILM COATED ORAL at 09:05

## 2017-02-21 RX ADMIN — AMIODARONE HYDROCHLORIDE 200 MG: 200 TABLET ORAL at 20:28

## 2017-02-21 RX ADMIN — AMIODARONE HYDROCHLORIDE 200 MG: 200 TABLET ORAL at 09:05

## 2017-02-21 RX ADMIN — GABAPENTIN 100 MG: 100 CAPSULE ORAL at 18:06

## 2017-02-21 RX ADMIN — Medication 10 ML: at 14:48

## 2017-02-21 RX ADMIN — INSULIN LISPRO 2 UNITS: 100 INJECTION, SOLUTION INTRAVENOUS; SUBCUTANEOUS at 13:21

## 2017-02-21 RX ADMIN — BUMETANIDE 2 MG: 1 TABLET ORAL at 09:05

## 2017-02-21 RX ADMIN — INSULIN LISPRO 2 UNITS: 100 INJECTION, SOLUTION INTRAVENOUS; SUBCUTANEOUS at 18:06

## 2017-02-21 RX ADMIN — Medication 10 ML: at 23:42

## 2017-02-21 RX ADMIN — Medication 81 MG: at 09:05

## 2017-02-21 RX ADMIN — Medication 10 ML: at 07:00

## 2017-02-21 RX ADMIN — GABAPENTIN 100 MG: 100 CAPSULE ORAL at 09:05

## 2017-02-21 RX ADMIN — INSULIN LISPRO 8 UNITS: 100 INJECTION, SUSPENSION SUBCUTANEOUS at 18:06

## 2017-02-21 RX ADMIN — BUMETANIDE 2 MG: 1 TABLET ORAL at 20:28

## 2017-02-21 RX ADMIN — CARVEDILOL 3.12 MG: 3.12 TABLET, FILM COATED ORAL at 18:06

## 2017-02-21 RX ADMIN — ATORVASTATIN CALCIUM 10 MG: 10 TABLET, FILM COATED ORAL at 09:05

## 2017-02-21 RX ADMIN — OXYCODONE HYDROCHLORIDE AND ACETAMINOPHEN 2 TABLET: 5; 325 TABLET ORAL at 19:30

## 2017-02-21 NOTE — PROGRESS NOTES
Met with the patient yesterday and called his wife this morning to share discharge will likely occur tomorrow 2/22 to home. Referrals made to [facility] for home inotrope and to [facility] for lifevest. Awaiting contract to be signed in order for [facility] to process and fit the patient. Referral made to [address] [place] [address] for skilled nursing. Patient has no questions at this time & his wife's only concern is ensuring she is available for training/education on inotrope and lifevest.  will continue to follow and assist with plan of care.     Leesa Bush, MSW, LCSW    Clinical    Jennifer Roman [phone]

## 2017-02-21 NOTE — WOUND CARE
Wound reassessment for right lower extremity- reviewed chart, assessed patient and spoke with nurse, Karlos Mcintyre RN;   Patient alert experiencing pain rated 3/10 and requested pain relief medication; Karlos Mcintyre RN aware;  Patient positioned to left visiting with spiritual care at completion of assessment;       1. Right medial lower leg:  removed mepilex border dressing with scant yellow drainage, no odor. Cleansed with normal saline, applied Mepilex dressing;     2. Right heel area 4cm x 5cm x 0.1cm is an area that patient peeled dry skin off; stable dry skin peeling with fissures;       Recommendations-      1. Continue on current surface;   2. Encourage patient to reposition approximately every 2 hours;   3. Float heels on pillows;   4. Protect skin from device injury; Wound Care:   - daily clean right medial lower extremity with Makayla Klenz Spray, dry, apply thin film of carrasyn gel to right medial   Lower leg and mepilex border dressing. Change daily.     Discussed with Karlos Mcintyre RN;     KAHTRIN Boles, RN, [facility]  PAGER [phone] [facility] WILLIAM Velez

## 2017-02-21 NOTE — PROGRESS NOTES
[facility]    Home Infusions Order    Pt will require Milrinone 0.375 mck/kg/min continuously via PICC line. Management per home infusion and home health care nursing. Marya Matt PA-C      Patient seen and examined. Data and note reviewed. I have discussed and agree with the plans as noted. Thank you for allowing us to participate in your patient's care. Dorie Valdez MD, [place] [phone] Director    [address] [place] [address]  Office [phone]  Fax [phone]

## 2017-02-21 NOTE — PROGRESS NOTES
[facility]   [address] [facility], [facility] [address], [facility]  Phone: [phone]   [phone]       Nephrology Progress Note  Pat Lane.     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 BP dropped yesterday evening, stable this AM.  Cr stable, good UOP. Review of Systems: Pertinent items are noted in HPI.     Current Medications:   Current Facility-Administered Medications   Medication Dose Route Frequency    bumetanide (BUMEX) tablet 2 mg  2 mg Oral BID    alteplase (CATHFLO) 1 mg in sterile water (preservative free) 1 mL injection  1 mg InterCATHeter PRN    sodium chloride (NS) flush 5-10 mL  5-10 mL InterCATHeter Q8H    insulin lispro protamine/insulin lispro (HUMALOG MIX 75/25) injection 10 Units  10 Units SubCUTAneous ACB    insulin lispro protamine/insulin lispro (HUMALOG MIX 75/25) injection 8 Units  8 Units SubCUTAneous ACD    glucose chewable tablet 16 g  4 Tab Oral PRN    dextrose (D50W) injection syrg 12.5-25 g  12.5-25 g IntraVENous PRN    glucagon (GLUCAGEN) injection 1 mg  1 mg IntraMUSCular PRN    insulin lispro (HUMALOG) injection   SubCUTAneous AC&HS    oxyCODONE-acetaminophen (PERCOCET) 5-325 mg per tablet 2 Tab  2 Tab Oral Q4H PRN    traMADol (ULTRAM) tablet  mg   mg Oral Q4H PRN    milrinone (PRIMACOR) 20 MG/100 ML D5W infusion  0.375 mcg/kg/min IntraVENous CONTINUOUS    nicotine (NICODERM CQ) 14 mg/24 hr patch 1 Patch  1 Patch TransDERmal DAILY    spironolactone (ALDACTONE) tablet 25 mg  25 mg Oral DAILY    bisacodyl (DULCOLAX) suppository 10 mg  10 mg Rectal DAILY PRN    magnesium hydroxide (MILK OF MAGNESIA) 400 mg/5 mL oral suspension 30 mL  30 mL Oral DAILY PRN    clopidogrel (PLAVIX) tablet 75 mg  75 mg Oral DAILY    docusate sodium (COLACE) capsule 100 mg  100 mg Oral DAILY PRN    gabapentin (NEURONTIN) capsule 100 mg  100 mg Oral BID    carvedilol (COREG) tablet 3.125 mg  3.125 mg Oral BID WITH MEALS    amiodarone (CORDARONE) tablet 200 mg  200 mg Oral Q12H    atorvastatin (LIPITOR) tablet 10 mg  10 mg Oral DAILY    acetaminophen (TYLENOL) tablet 650 mg  650 mg Oral Q6H PRN    aspirin delayed-release tablet 81 mg  81 mg Oral DAILY    magnesium oxide (MAG-OX) tablet 400 mg  400 mg Oral DAILY    diphenhydrAMINE (BENADRYL) capsule 25 mg  25 mg Oral QHS PRN    ELECTROLYTE REPLACEMENT PROTOCOL  1 Each Other PRN      Allergies   Allergen Reactions    Heparin (Porcine) Unknown (comments)       Objective:  Vitals:    Vitals:    02/20/17 2055 02/20/17 2320 02/21/17 0308 02/21/17 0802   BP: 111/63 110/74 106/66 115/68   Pulse: 88 84 83 79   Resp:  16 18 18   Temp:  97.9 °F (36.6 °C) 97.8 °F (36.6 °C)    SpO2:  99% 99% 98%   Weight:   76.8 kg (169 lb 5 oz)    Height:         Intake and Output:  02/21 0701 - 02/21 1900  In: 281.7 [P.O.:240; I.V.:41.7]  Out: 530 [Urine:530]  02/19 1901 - 02/21 0700  In: 1945.8 [P.O.:1649;  I.V.:296.8]  Out: 4500 [Urine:4500]    Physical Examination:  General: In NAD  Resp:  Lungs CTA  CV:  RRR,  no murmur or rub,+ LE edema  GI:  Soft, NT, + Bowel sounds,  Neurologic:  Non focal  Skin:  RLE cellulitis - resolving   Ext                   Edema +,      []    High complexity decision making was performed  []    Patient is at high-risk of decompensation with multiple organ involvement    Lab Data Personally Reviewed: I have reviewed all the pertinent labs, microbiology data and radiology studies during assessment. Recent Labs      02/21/17 0303 02/20/17 0400 02/19/17 0348   NA  133*  132*  134*   K  3.9  3.5  3.6   CL  96*  94*  95*   CO2  26  30  26   GLU  132*  175*  165*   BUN  29*  27*  35*   CREA  1.82*  1.66*  1.99*   CA  8.6  9.0  8.9   MG  2.2  2.1  2.2   ALB  2.9*  3.1*  2.8*   SGOT  16  18  16   ALT  20  21  20   INR  1.3*  1.3*  1.3*     Recent Labs      02/21/17 0303 02/20/17 0400 02/19/17   0348   WBC  5.5  5.5  5.5   HGB  8.6*  8.8*  8.6*   HCT  27.4*  27.8*  27.2*   PLT  262  246  222     No results found for: SDES  Lab Results   Component Value Date/Time    Culture result: NO GROWTH 5 DAYS 02/05/2017 03:22 AM    Culture result: NO GROWTH 4 DAYS 01/26/2017 03:05 PM    Culture result:  01/25/2017 09:34 PM     LIGHT  STREPTOCOCCI, BETA HEMOLYTIC GROUP C  . .. Penicillin and ampicillin are drugs of choice for treatment of beta-hemolytic streptococcal infections. Susceptibility testing of penicillins and beta-lactams approved by the FDA for treatment of beta-hemolytic streptococcal infections need not be performed routinely, because nonsusceptible isolates are extremely rare.  CLSI 2012      Culture result: MODERATE  CANDIDA TROPICALIS   01/25/2017 09:34 PM    Culture result: NO SIGNIFICANT GROWTH 01/19/2017 01:35 AM     Recent Results (from the past 24 hour(s))   GLUCOSE, POC    Collection Time: 02/20/17 12:07 PM   Result Value Ref Range    Glucose (POC) 198 (H) 65 - 100 mg/dL    Performed by Héctor Mg , POC    Collection Time: 02/20/17  5:12 PM   Result Value Ref Range    Glucose (POC) 174 (H) 65 - 100 mg/dL    Performed by [address] [place] [address], POC    Collection Time: 02/20/17 10:47 PM   Result Value Ref Range    Glucose (POC) 212 (H) 65 - 100 mg/dL    Performed by Abrahan Rodriguez    METABOLIC PANEL, COMPREHENSIVE    Collection Time: 02/21/17  3:03 AM   Result Value Ref Range    Sodium 133 (L) 136 - 145 mmol/L    Potassium 3.9 3.5 - 5.1 mmol/L    Chloride 96 (L) 97 - 108 mmol/L    CO2 26 21 - 32 mmol/L    Anion gap 11 5 - 15 mmol/L    Glucose 132 (H) 65 - 100 mg/dL    BUN 29 (H) 6 - 20 MG/DL    Creatinine 1.82 (H) 0.70 - 1.30 MG/DL    BUN/Creatinine ratio 16 12 - 20      GFR est AA 46 (L) >60 ml/min/1.73m2    GFR est non-AA 38 (L) >60 ml/min/1.73m2    Calcium 8.6 8.5 - 10.1 MG/DL    Bilirubin, total 0.5 0.2 - 1.0 MG/DL    ALT (SGPT) 20 12 - 78 U/L    AST (SGOT) 16 15 - 37 U/L    Alk. phosphatase 101 45 - 117 U/L    Protein, total 7.1 6.4 - 8.2 g/dL    Albumin 2.9 (L) 3.5 - 5.0 g/dL    Globulin 4.2 (H) 2.0 - 4.0 g/dL    A-G Ratio 0.7 (L) 1.1 - 2.2     MAGNESIUM    Collection Time: 02/21/17  3:03 AM   Result Value Ref Range    Magnesium 2.2 1.6 - 2.4 mg/dL   CBC W/O DIFF    Collection Time: 02/21/17  3:03 AM   Result Value Ref Range    WBC 5.5 4.1 - 11.1 K/uL    RBC 3.40 (L) 4.10 - 5.70 M/uL    HGB 8.6 (L) 12.1 - 17.0 g/dL    HCT 27.4 (L) 36.6 - 50.3 %    MCV 80.6 80.0 - 99.0 FL    MCH 25.3 (L) 26.0 - 34.0 PG    MCHC 31.4 30.0 - 36.5 g/dL    RDW 18.2 (H) 11.5 - 14.5 %    PLATELET 370 601 - 903 K/uL   PROTHROMBIN TIME + INR    Collection Time: 02/21/17  3:03 AM   Result Value Ref Range    INR 1.3 (H) 0.9 - 1.1      Prothrombin time 13.3 (H) 9.0 - 11.1 sec   GLUCOSE, POC    Collection Time: 02/21/17  7:15 AM   Result Value Ref Range    Glucose (POC) 145 (H) 65 - 100 mg/dL    Performed by Sara Burton I have reviewed the flowsheets. Chart and Pertinent Notes have been reviewed. No change in PMH ,family and social history from Consult note.       Issa Cruz MD

## 2017-02-21 NOTE — PROGRESS NOTES
Advanced Heart Failure Progress Note      Admit Date: 1/20/2017       Procedures:  SVO2 swan placed via RFV  Impella placed via LFA  PCI performed via RFA     Successful MIKE pRCA: 2.5x38 Xience + 2.75x12 Xience overlapping. Successful MIKE ost left main: 4.0x12 Xience post-dil with 4.5x12 NC. By Dr. Paul Faulkner    Removal percutaneous ventricular   assist device (Impella pump) at separate and distinct session from   insertion (CPT CODE 85502) on 2/14/17 by Dr. Radha Roberson    Colonoscopy and EGT on 2/17/17 by Dr. Lia Alcantar Study 2/21/17        Subjective:     Pt resting in bed on Milrinone . 375mcg/kg/min - denies CP, SOB, BRBPR, or melena. Feels good - ready to go home. IMPRESSION/Plan:   s/p PCI w/ Impella support - On ASA and Plavix. Cont. low dose Coreg,  No ACEi d/t renal dysfunction. Continue statin. Will need to f/u with cardiology as outpatient. Acute on chronic systolic heart failure (ICM) - Stage D, NYHA Class IV - continue Milrinone   0.375 mcg/kg/min - pt is inotrope dependent and will require milrinone as outpatient. Discharge w/ Life Vest - pt needs GDMT and if EF remains low will proceeds with ICD. Cont. Current dose of Bumex at 2 mg BID and low dose Coreg. No ACEi d/t renal insufficiency. Daily weights. Strict I/Os. Trend labs. Hope to have Life Vest arranged for tomorrow - pt will be discharged once this is available. He will require close f/u in [facility].     Chronic Anemia - Hg stable, no active bleeding. Capsule study was negative for active bleeding. Pt will need cauterizing of AVM and polyp removal at some point as outpatient - GI would like to do off Plavix and we are unable to stop Plavix at this time d/t recent stents. Pt will need to f/u w/ GI as outpatient. +Hep C Ab - HCV RNA negative - Dr. Sherrie Sebastian feels this was exposure w/ clearance of Hep C infection. Pt needs one more negative HCV RNA to confirm no Hep C infection.     Acalculous Cholecystitis - resolved treated w/ Zosyn     L pleural effusion - improved on last CXR. H/O PAF - no recent AF - no anticoagulation d/t anemia and GI bleeding    CECILIA - REJI positive, platelets good - close f/u NO HEPARIN     Hyponatremia - stable, cont. Current dose of diuretics     Diabetes Mellitus - continue current treatments - Appreciate  DTC input.      Cellulitis - on Ancef per ID, improved     H/o Tobacco abuse - smoking cessation, nicotine patch.     CHACORTA on CKD3 - remains fairly stable, renal following, cont. Bumex and current dose     Weak urinary stream - postvoid residual ok.   No further w/u recommended per renal            Objective:   Vitals:  Patient Vitals for the past 12 hrs:   Temp Pulse Resp BP SpO2   02/21/17 1125 97.5 °F (36.4 °C) 85 18 107/65 95 %   02/21/17 0802 - 79 18 115/68 98 %   02/21/17 0308 97.8 °F (36.6 °C) 83 18 106/66 99 %     EKG: sinus tach    Admission Weight: Last Weight   Weight: 178 lb 2.1 oz (80.8 kg) Weight: 167 lb 15.9 oz (76.2 kg)     Intake / Output / Drain:    Intake/Output Summary (Last 24 hours) at 02/21/17 1240  Last data filed at 02/21/17 1127   Gross per 24 hour   Intake          1366.37 ml   Output             2405 ml   Net         -1038.63 ml         EXAM:    HEENT:  Anicteric, EOMI       CVS:  S1/S2        LUNGS: slightly decreased L base - otherwise clear                  ABD:  +BS, NT/ND                  EXT:  Trace-1+ b/l LE edema, b/l TEDs in place                  Neuro:  No obvious deficits    Labs:   Lab Results   Component Value Date/Time    Sodium 133 02/21/2017 03:03 AM    Potassium 3.9 02/21/2017 03:03 AM    Chloride 96 02/21/2017 03:03 AM    CO2 26 02/21/2017 03:03 AM    Anion gap 11 02/21/2017 03:03 AM    Glucose 132 02/21/2017 03:03 AM    BUN 29 02/21/2017 03:03 AM    Creatinine 1.82 02/21/2017 03:03 AM    BUN/Creatinine ratio 16 02/21/2017 03:03 AM    GFR est AA 46 02/21/2017 03:03 AM    GFR est non-AA 38 02/21/2017 03:03 AM    Calcium 8.6 02/21/2017 03:03 AM     Lab Results Component Value Date/Time    WBC 5.5 02/21/2017 03:03 AM    HGB 8.6 02/21/2017 03:03 AM    HCT 27.4 02/21/2017 03:03 AM    PLATELET 727 41/04/3116 03:03 AM    MCV 80.6 02/21/2017 03:03 AM     Pt seen with Dr. Marely Lucio and Dr. Leal Box By: Lisa Cohn, [facility]    Saw patient, agree with above  Risk of morbidity and mortality - high  Medical decision making - high complexity

## 2017-02-21 NOTE — PROGRESS NOTES
Infectious Diseases Progress Note    Antibiotic Summary:  Levaquin  --   Vancomycin  --   Ancef    -- 2/3  Keflex   2/3 --   Zosyn   --       Subjective:     Pain on bottom of feet; no other complaints    Objective:     Vitals:   Visit Vitals    /63 (BP 1 Location: Right arm, BP Patient Position: At rest)    Pulse 88    Temp 97.5 °F (36.4 °C)    Resp 16    Ht 5' 9\" (1.753 m)    Wt 76.4 kg (168 lb 6.9 oz)    SpO2 96%    BMI 24.87 kg/m2        Tmax:  Temp (24hrs), Av.7 °F (36.5 °C), Min:97.5 °F (36.4 °C), Max:97.9 °F (36.6 °C)      Exam:  General appearance: alert, no distress  Lungs: clear  Heart: RRR  Abdomen: nontender  Left leg: cellulitis resolved  Right leg: cellulitis resolved    IV Lines: Left PICC inserted     Labs:    Recent Labs      17   0400  17   0348  17   0530  17   0352   WBC  5.5  5.5  6.5   --    HGB  8.8*  8.6*  8.5*   --    PLT  246  222  201   --    BUN  27*  35*   --   32*   CREA  1.66*  1.99*   --   1.65*   TBILI  0.6  0.4   --   0.7   SGOT  18  16   --   26   AP  105  101   --   97     Culture right leg ulcers  = group C Streptococcus    US abdomen 2/3 = \"Distended gallbladder with gallbladder wall thickening,  pericholecystic fluid and tenderness to probe palpation during exam. Findings  are consistent with acalculus cholecystitis\"    HIDA on  = visualization with filling of the GB, CBD, and small bowel -- GB EF 31%    Assessment:     1. Bilateral venous stasis disease of the LEs +/- cellulitis: Resolved     2. RUQ tenderness and abnormal GB US: Clinically, he says the symptoms have been for months or even years. Remains on Zosyn. HIDA shows visualization of the GB with mildly decreased EF of 31%. The pain has resolved for the first time in \"5 years\"     3. OHD -- IHD/CAD; CHF; pulm HTN     4. CRF with acute exacerbation     5. NIDDM -- new diagnosis     6. Probable COPD -- heavy smoking since age 7      9. Anemia -- HC/MC -- positive family history of colon cancer -- may need GI W/U    Plan:     1.  Watch off antibiotics      Karla Chery MD

## 2017-02-21 NOTE — PROGRESS NOTES
[address]  174 [facility], [address]       GI PROGRESS NOTE  Kunal Fleming [facility]  [phone]    NAME: Alvaro Keenan. :  1950   MRN:  [id number]       Subjective:     \"Trying to sleep\", no GI complaints or signs of bleeding overnight    Objective:     VITALS:   Last 24hrs VS reviewed since prior progress note. Most recent are:  Visit Vitals    /68 (BP 1 Location: Right arm, BP Patient Position: At rest)    Pulse 79    Temp 97.8 °F (36.6 °C)    Resp 18    Ht 5' 9\" (1.753 m)    Wt 76.8 kg (169 lb 5 oz)    SpO2 98%    BMI 25 kg/m2       PHYSICAL EXAM:  General: Cooperative, no acute distress    Neurologic:  Alert and oriented X 3. HEENT: PERRLA, EOMI. Lungs:  CTA Bilaterally. No Wheezing  Heart:  s1 s2  Abdomen: Soft, Non distended, Non tender.  +Bowel sounds  Extremities: No edema  Psych:   Good insight. Not anxious nor agitated.     Lab Data Reviewed:     Recent Results (from the past 24 hour(s))   GLUCOSE, POC    Collection Time: 17 12:07 PM   Result Value Ref Range    Glucose (POC) 198 (H) 65 - 100 mg/dL    Performed by Santino Kumar    GLUCOSE, POC    Collection Time: 17  5:12 PM   Result Value Ref Range    Glucose (POC) 174 (H) 65 - 100 mg/dL    Performed by [address] [place] [address], POC    Collection Time: 17 10:47 PM   Result Value Ref Range    Glucose (POC) 212 (H) 65 - 100 mg/dL    Performed by Blanca Patino    METABOLIC PANEL, COMPREHENSIVE    Collection Time: 17  3:03 AM   Result Value Ref Range    Sodium 133 (L) 136 - 145 mmol/L    Potassium 3.9 3.5 - 5.1 mmol/L    Chloride 96 (L) 97 - 108 mmol/L    CO2 26 21 - 32 mmol/L    Anion gap 11 5 - 15 mmol/L    Glucose 132 (H) 65 - 100 mg/dL    BUN 29 (H) 6 - 20 MG/DL    Creatinine 1.82 (H) 0.70 - 1.30 MG/DL    BUN/Creatinine ratio 16 12 - 20      GFR est AA 46 (L) >60 ml/min/1.73m2    GFR est non-AA 38 (L) >60 ml/min/1.73m2    Calcium 8.6 8.5 - 10.1 MG/DL    Bilirubin, total 0.5 0.2 - 1.0 MG/DL    ALT (SGPT) 20 12 - 78 U/L    AST (SGOT) 16 15 - 37 U/L    Alk. phosphatase 101 45 - 117 U/L    Protein, total 7.1 6.4 - 8.2 g/dL    Albumin 2.9 (L) 3.5 - 5.0 g/dL    Globulin 4.2 (H) 2.0 - 4.0 g/dL    A-G Ratio 0.7 (L) 1.1 - 2.2     MAGNESIUM    Collection Time: 02/21/17  3:03 AM   Result Value Ref Range    Magnesium 2.2 1.6 - 2.4 mg/dL   CBC W/O DIFF    Collection Time: 02/21/17  3:03 AM   Result Value Ref Range    WBC 5.5 4.1 - 11.1 K/uL    RBC 3.40 (L) 4.10 - 5.70 M/uL    HGB 8.6 (L) 12.1 - 17.0 g/dL    HCT 27.4 (L) 36.6 - 50.3 %    MCV 80.6 80.0 - 99.0 FL    MCH 25.3 (L) 26.0 - 34.0 PG    MCHC 31.4 30.0 - 36.5 g/dL    RDW 18.2 (H) 11.5 - 14.5 %    PLATELET 113 949 - 010 K/uL   PROTHROMBIN TIME + INR    Collection Time: 02/21/17  3:03 AM   Result Value Ref Range    INR 1.3 (H) 0.9 - 1.1      Prothrombin time 13.3 (H) 9.0 - 11.1 sec   GLUCOSE, POC    Collection Time: 02/21/17  7:15 AM   Result Value Ref Range    Glucose (POC) 145 (H) 65 - 100 mg/dL    Performed by Lani Amador          ________________________________________________________________________       Assessment:   · Heme + Anemia - EGD/Colonoscopy/ M2A capsule all negative for active bleeding. A colon polyp and cecal AVM found on colonoscopy and he will need an out patient colonoscopy off Plavix.      Patient Active Problem List   Diagnosis Code    Acute systolic (congestive) heart failure (HCC) I50.21    Bilateral lower extremity edema R60.0    Shortness of breath R06.02    Acute renal failure (ARF) (HCC) N17.9    Hyperglycemia due to type 2 diabetes mellitus ([facility] E11.65    Venous stasis dermatitis of both lower extremities I83.11, I83.12    Hypoxia R09.02    Pulmonary edema J81.1    Sinus tachycardia R00.0    Ischemic cardiomyopathy X33.1    Systolic heart failure (HCC) I50.20     Plan:   · No signs of active bleeding, no hgb check since 2/18  · Out patient Colonoscopy for polypectomy and AVM ablation off plavix   · OK to DC home per GI standpoint     Signed By: Tessie Choe NP     2/21/2017  9:56 AM

## 2017-02-21 NOTE — CONSULTS
60 Moisés Gentile MD, [address]     April MONICA Epperson PA-C Rip Cleaver, MD, MD Luisa Dior NP Pleas Pluck, NP Laan [facility] N[facility]w [facility], [address] [address]  22.     [phone]     FAX: [address] [place] [address], [address] [place] [address], [address]     [phone]     FAX: [phone]         HEPATOLOGY CONSULT NOTE  I was asked to see this patient in consultation by Marcel JENKINS for management of liver disease. I have reviewed the Emergency room note, Hospital admission note, Notes by all other physicians who have seen the patient during this hospitalization to date. I have reviewed the problem list and the reason for this hospitalization. I have reviewed the allergies and the medications the patient was taking at home prior to this hospitalization. HISTORY:  The patient is a 61year old  male who was hospitalized at [facility] in mid-January 2017 with CHF. A cardiac catheterization demonstrated extensive 3 vessel CAD. EF was 10-15%. He was transferred to [facility] for heart failure management. He was tested for HCV and found to be positive for anti-HCV. He does have risk factors for HCV using IVDA in his 25s. There was no history of ever having acute icteric hepatitis. He has not used IVD in over 25 years. HCV RNA was undetected. The liver enzymes have been normal.  The platelet count is normal.    ASSESSMENT AND PLAN:  Anti-HCV positive  The patient is anti-HCV positive and has therefore been exposed to HCV but is HCV RNA undetectable. This means he had spontaneous clearance of HCV after exposure. He does not have HCV. Will repeat HCV RNA to make certain the first test was not false negative.   Once he has 2 negative HCV RNA tests than no further testing for HCV is needed. SYSTEM REVIEW:  Constitution systems: Negative for fever, chills, weight gain, weight loss. Eyes: Negative for visual changes. ENT: Negative for sore throat, painful swallowing. Respiratory: Negative for cough, hemoptysis, SOB. Cardiology: Negative for chest pain, palpitations. GI:  Negative for constipation or diarrhea. : Negative for urinary frequency, dysuria, hematuria, nocturia. Skin: Negative for rash. Hematology: Negative for easy bruising, blood clots. Musculo-skelatal: Negative for back pain, muscle pain, weakness. Neurologic: Negative for headaches, dizziness, vertigo, memory problems not related to HE. Psychology: Negative for anxiety, depression. FAMILY HISTORY:  The father  of colon cancer. The mother  of CHF. There is no family history of liver disease. SOCIAL HISTORY:  The patient is . The patient has 2 living children, and 1 child who  in [address] The patient stopped using tobacco products in 2017 when he was first hospitalized. The patient has never consumed significant amounts of alcohol. The patient used to work as a . He stopped working in  because of SOB and NAVARRO. PHYSICAL EXAMINATION:  VS: per nursing note  General:  No acute distress. Eyes:  Sclera anicteric. ENT:  No oral lesions. Thyroid normal.  Nodes:  No adenopathy. Skin:  No spider angiomata. No jaundice. Respiratory:  Lungs clear to auscultation. Cardiovascular:  Regular heart rate. Abdomen:  Soft non-tender, No obvious ascites. Extremities:  No lower extremity edema. Neurologic:  Alert and oriented. Cranial nerves grossly intact. No asterixis. LABORATORY:  Results for Miriam Brothers (MRN [id number]) as of 2017 06:18   Ref.  Range 2017 03:52 2017 03:48 2017 04:00 2017 03:03   WBC Latest Ref Range: 4.1 - 11.1 K/uL 6.5 5.5 5.5 5.5   HGB Latest Ref Range: 12.1 - 17.0 g/dL 8.5 (L) 8.6 (L) 8.8 (L) 8.6 (L)   PLATELET Latest Ref Range: 150 - 400 K/uL 201 222 246 262   INR Latest Ref Range: 0.9 - 1.1   1.3 (H) 1.3 (H) 1.3 (H) 1.3 (H)   Sodium Latest Ref Range: 136 - 145 mmol/L 134 (L) 134 (L) 132 (L) 133 (L)   Potassium Latest Ref Range: 3.5 - 5.1 mmol/L 4.1 3.6 3.5 3.9   CO2 Latest Ref Range: 21 - 32 mmol/L 27 26 30 26   Chloride Latest Ref Range: 97 - 108 mmol/L 94 (L) 95 (L) 94 (L) 96 (L)   Glucose Latest Ref Range: 65 - 100 mg/dL 122 (H) 165 (H) 175 (H) 132 (H)   BUN Latest Ref Range: 6 - 20 MG/DL 32 (H) 35 (H) 27 (H) 29 (H)   Creatinine Latest Ref Range: 0.70 - 1.30 MG/DL 1.65 (H) 1.99 (H) 1.66 (H) 1.82 (H)   Bilirubin, total Latest Ref Range: 0.2 - 1.0 MG/DL 0.7 0.4 0.6 0.5   Albumin Latest Ref Range: 3.5 - 5.0 g/dL 2.8 (L) 2.8 (L) 3.1 (L) 2.9 (L)   ALT Latest Ref Range: 12 - 78 U/L 23 20 21 20   AST Latest Ref Range: 15 - 37 U/L 26 16 18 16   Alk. phosphatase Latest Ref Range: 45 - 117 U/L 97 101 105 101       RADIOLOGY:  CT scan abdomen without IV contrast.  Normal appearing liver. No liver mass lesions. Normal spleen. No ascites.     Javad Funes MD  [facility] [place] [facility] [employer] [address] [facility] 7  [address] [facility] 22. [phone]  '

## 2017-02-21 NOTE — PROGRESS NOTES
Spiritual Care Partner Volunteer visited patient in [place] [address] on 2/21/17. Documented by:  Slade Dugan M.Div.    Paging Service [phone])

## 2017-02-21 NOTE — PROGRESS NOTES
[facility] Progress Note      NAME:  Fernanda Ruiz. :   1950   MRN:   [id number]   PCP:  None  CARD:   Dr. Keisha Beard    Date:  2017     Fernanda Leal is a 61 y.o. male with a who presented for further evaluation of severe CAD and systolic heart failure. Subjective:   He was initially seen at [facility] 3 years ago and told that he had heart failure with LVEF 30%. He was lost to follow up due to lack of insurance and has not been seen by a physician in the interim. He complains of progressive fatigue, NAVARRO, PND, and edema over the past year, prompting presentation to [facility]. He also complained of lower extremity bullae, weeping, and pain. He denied palpitations, presyncope, syncope, or chest pain. Past 24 hours:  Negative capsule study  Denies dyspnea  Ambulating in hallway    Objective:     Visit Vitals    /65 (BP 1 Location: Right arm, BP Patient Position: At rest)    Pulse 85    Temp 97.5 °F (36.4 °C)    Resp 18    Ht 5' 9\" (1.753 m)    Wt 169 lb 5 oz (76.8 kg)    SpO2 95%    BMI 25 kg/m2          General:  fatigued    HEENT: Normocephalic, EOMI, PERRLA, Hearing intact, trachea mid-line    Neck:  supple, no significant adenopathy, carotids upstroke normal bilaterally, no bruits    CVP:  5 cm      Heart:  Diminished PMI    Normal S1 and S2, S3 gallop    Murmur: 1/6 systolic murmur    Lungs: Diminished breath sounds left lower lung    Abdomen: soft, non-tender. Bowel sounds normal. +HSM, camera attached to abdomen    Extremity: No edema bilaterally    Neuro: Alert and oriented to person, place, and time; normal strength and tone. Normal symmetric reflexes. Normal coordination and gait      1901 -  0700  In: 1945.8 [P.O.:1649;  I.V.:296.8]  Out: 4500 [Urine:4500]  O2 Flow Rate (L/min): 2 l/min O2 Device: Room air  Temp (24hrs), Av.7 °F (36.5 °C), Min:97.5 °F (36.4 °C), Max:97.9 °F (36.6 °C)          Care Plan discussed with:    Comments Patient x    Family      RN x    Care Manager                    Consultant:          Past History:     Past Medical History   Diagnosis Date    Cardiomyopathy ([address] 1/20/2017     A. Echo (1/19/17):  EF 5-10% with severe GHK,. Mildly dil LA. Mild TR. PASP 46.       Past Surgical History   Procedure Laterality Date    Colonoscopy N/A 2/17/2017     COLONOSCOPY performed by Mason Lennon. Margoth Drummond MD at [facility] ENDOSCOPY       Social History   Substance Use Topics    Smoking status: Not on file    Smokeless tobacco: Not on file    Alcohol use Not on file        History reviewed. No pertinent family history. Allergies: Allergies   Allergen Reactions    Heparin (Porcine) Unknown (comments)          Data Review:     CXR:   CXR Results  (Last 48 hours)    None        Echocardiogram:   Echo Results  (Last 48 hours)    None        No results found for this visit on 01/20/17. ECG:  EKG: ST with occasional PVC, NSIVCD, LAE    LABS:  Recent Results (from the past 24 hour(s))   GLUCOSE, POC    Collection Time: 02/20/17  5:12 PM   Result Value Ref Range    Glucose (POC) 174 (H) 65 - 100 mg/dL    Performed by [address] [place] [address], POC    Collection Time: 02/20/17 10:47 PM   Result Value Ref Range    Glucose (POC) 212 (H) 65 - 100 mg/dL    Performed by [employer]    METABOLIC PANEL, COMPREHENSIVE    Collection Time: 02/21/17  3:03 AM   Result Value Ref Range    Sodium 133 (L) 136 - 145 mmol/L    Potassium 3.9 3.5 - 5.1 mmol/L    Chloride 96 (L) 97 - 108 mmol/L    CO2 26 21 - 32 mmol/L    Anion gap 11 5 - 15 mmol/L    Glucose 132 (H) 65 - 100 mg/dL    BUN 29 (H) 6 - 20 MG/DL    Creatinine 1.82 (H) 0.70 - 1.30 MG/DL    BUN/Creatinine ratio 16 12 - 20      GFR est AA 46 (L) >60 ml/min/1.73m2    GFR est non-AA 38 (L) >60 ml/min/1.73m2    Calcium 8.6 8.5 - 10.1 MG/DL    Bilirubin, total 0.5 0.2 - 1.0 MG/DL    ALT (SGPT) 20 12 - 78 U/L    AST (SGOT) 16 15 - 37 U/L    Alk.  phosphatase 101 45 - 117 U/L Protein, total 7.1 6.4 - 8.2 g/dL    Albumin 2.9 (L) 3.5 - 5.0 g/dL    Globulin 4.2 (H) 2.0 - 4.0 g/dL    A-G Ratio 0.7 (L) 1.1 - 2.2     MAGNESIUM    Collection Time: 02/21/17  3:03 AM   Result Value Ref Range    Magnesium 2.2 1.6 - 2.4 mg/dL   CBC W/O DIFF    Collection Time: 02/21/17  3:03 AM   Result Value Ref Range    WBC 5.5 4.1 - 11.1 K/uL    RBC 3.40 (L) 4.10 - 5.70 M/uL    HGB 8.6 (L) 12.1 - 17.0 g/dL    HCT 27.4 (L) 36.6 - 50.3 %    MCV 80.6 80.0 - 99.0 FL    MCH 25.3 (L) 26.0 - 34.0 PG    MCHC 31.4 30.0 - 36.5 g/dL    RDW 18.2 (H) 11.5 - 14.5 %    PLATELET 897 955 - 942 K/uL   PROTHROMBIN TIME + INR    Collection Time: 02/21/17  3:03 AM   Result Value Ref Range    INR 1.3 (H) 0.9 - 1.1      Prothrombin time 13.3 (H) 9.0 - 11.1 sec   GLUCOSE, POC    Collection Time: 02/21/17  7:15 AM   Result Value Ref Range    Glucose (POC) 145 (H) 65 - 100 mg/dL    Performed by [facility]    GLUCOSE, POC    Collection Time: 02/21/17 11:25 AM   Result Value Ref Range    Glucose (POC) 224 (H) 65 - 100 mg/dL    Performed by [facility]        Results for Shefali Lyons (MRN [id number]) as of 2/20/2017 16:43   Ref. Range 2/14/2017 16:29   Hepatitis A, IgM Latest Ref Range: NR   NONREACTIVE   Hepatitis B surface Ag Latest Units: Index 0.25   Hep B surface Ag Interp. Latest Ref Range: NEG   NEGATIVE   Hepatitis B core, IgM Latest Ref Range: NR   NONREACTIVE   Hepatitis C virus Ab Latest Units: Index >11.90   Hep C  virus Ab Interp. Latest Ref Range: NR   REACTIVE (A)   Hep C  virus Ab comment Latest Units:   Method used is Si. ..        All Micro Results     Procedure Component Value Units Date/Time    CULTURE, BLOOD, PAIRED [[id number]] Collected:  02/05/17 0322    Order Status:  Completed Specimen:  Blood Updated:  02/10/17 0542     Special Requests: NO SPECIAL REQUESTS        Culture result: NO GROWTH 5 DAYS       CULTURE, BODY FLUID Ramonita Bentley [[id number]] Collected:  01/26/17 1505    Order Status:  Completed Specimen:  Thoracentesis Updated:  01/30/17 1057     Special Requests: NO SPECIAL REQUESTS        GRAM STAIN OCCASIONAL  WBCS SEEN         NO ORGANISMS SEEN        Culture result: NO GROWTH 4 DAYS       CULTURE, Joaquin Aguirre [[id number]] Collected:  01/25/17 2134    Order Status:  Completed Specimen:  Leg Updated:  01/27/17 1440     Special Requests: NO SPECIAL REQUESTS        GRAM STAIN RARE  WBCS SEEN         NO ORGANISMS SEEN        Culture result: LIGHT  STREPTOCOCCI, BETA HEMOLYTIC GROUP C  . .. Penicillin and ampicillin are drugs of choice for treatment of beta-hemolytic streptococcal infections. Susceptibility testing of penicillins and beta-lactams approved by the FDA for treatment of beta-hemolytic streptococcal infections need not be performed routinely, because nonsusceptible isolates are extremely rare. CLSI 2012         MODERATE  BETTIE TROPICALIS           Abdominal Ultrasound 2/4/17  IMPRESSION: Distended gallbladder with gallbladder wall thickening,  pericholecystic fluid and tenderness to probe palpation during exam. Findings  are consistent with acalculus cholecystitis. Thoracentesis 1/26/17  Specimen Source   1: Pleural Fluid   CYTOLOGIC INTERPRETATION:   Scattered mesothelial cells with degenerative changes and mixed inflammatory cells   General Categorization   No cells diagnostic for malignancy   Specimen Adequacy   Satisfactory for evaluation       Cardiac MRI 1/31/17  IMPRESSION  1. Markedly dilated left ventricle with 3-D end-diastolic volume index to body  surface area of 153 mL/sq m. Mild eccentric left ventricular hypertrophy with  3-D mass index to body surface area of 85 g/sq m. Severe left ventricular  systolic dysfunction. Severe global hypokinesis with regional variation. 3-D  LVEF 10%. 2. Moderately dilated right ventricle by 3-D volumetric assessment. RV end  diastolic volume indexed to body surface area of 138 mL/sq m.  Moderate to severe  right ventricular systolic dysfunction. Global hypokinesis. 3-D RVEF 25%. 3. Apical a tethered mitral valve leaflets. Mild mitral regurgitation. 4. Moderately severe 3+ tricuspid regurgitation. 5. On LGE study, the anterior wall, anteroseptal wall, anteroapical wall, and  anterior lateral wall are largely viable without significant myocardial  infarction. The entire inferior wall and inferoseptal wall are completely viable  without any infarct. The base to mid inferolateral wall demonstrate a near  transmural greater than 75% thickness infarct with minimal or no viable  myocardium in this territory. The distal inferolateral wall, apical lateral wall  does not demonstrate any infarct and are largely viable. Based on the viability  imaging, the entire LAD territory is largely viable and should recover upon  revascularization. The entire RCA territory is largely viable and should recover  upon revascularization. The LCx territory demonstrate medium-size infarct with  limited viability. 6. Large left-sided pleural effusion with passive atelectasis of the left lung. 7. Dilated branch pulmonary arteries suggest pulmonary hypertension. 8. Markedly dilated left atrium measuring 59 x 55 mm. Moderately dilated right  atrium measuring 46 x 56 mm.     Medications reviewed:    Current Facility-Administered Medications   Medication Dose Route Frequency    bumetanide (BUMEX) tablet 2 mg  2 mg Oral BID    alteplase (CATHFLO) 1 mg in sterile water (preservative free) 1 mL injection  1 mg InterCATHeter PRN    sodium chloride (NS) flush 5-10 mL  5-10 mL InterCATHeter Q8H    insulin lispro protamine/insulin lispro (HUMALOG MIX 75/25) injection 10 Units  10 Units SubCUTAneous ACB    insulin lispro protamine/insulin lispro (HUMALOG MIX 75/25) injection 8 Units  8 Units SubCUTAneous ACD    glucose chewable tablet 16 g  4 Tab Oral PRN    dextrose (D50W) injection syrg 12.5-25 g  12.5-25 g IntraVENous PRN    glucagon (GLUCAGEN) injection 1 mg  1 mg IntraMUSCular PRN    insulin lispro (HUMALOG) injection   SubCUTAneous AC&HS    oxyCODONE-acetaminophen (PERCOCET) 5-325 mg per tablet 2 Tab  2 Tab Oral Q4H PRN    traMADol (ULTRAM) tablet  mg   mg Oral Q4H PRN    milrinone (PRIMACOR) 20 MG/100 ML D5W infusion  0.375 mcg/kg/min IntraVENous CONTINUOUS    nicotine (NICODERM CQ) 14 mg/24 hr patch 1 Patch  1 Patch TransDERmal DAILY    spironolactone (ALDACTONE) tablet 25 mg  25 mg Oral DAILY    bisacodyl (DULCOLAX) suppository 10 mg  10 mg Rectal DAILY PRN    magnesium hydroxide (MILK OF MAGNESIA) 400 mg/5 mL oral suspension 30 mL  30 mL Oral DAILY PRN    clopidogrel (PLAVIX) tablet 75 mg  75 mg Oral DAILY    docusate sodium (COLACE) capsule 100 mg  100 mg Oral DAILY PRN    gabapentin (NEURONTIN) capsule 100 mg  100 mg Oral BID    carvedilol (COREG) tablet 3.125 mg  3.125 mg Oral BID WITH MEALS    amiodarone (CORDARONE) tablet 200 mg  200 mg Oral Q12H    atorvastatin (LIPITOR) tablet 10 mg  10 mg Oral DAILY    acetaminophen (TYLENOL) tablet 650 mg  650 mg Oral Q6H PRN    aspirin delayed-release tablet 81 mg  81 mg Oral DAILY    magnesium oxide (MAG-OX) tablet 400 mg  400 mg Oral DAILY    diphenhydrAMINE (BENADRYL) capsule 25 mg  25 mg Oral QHS PRN    ELECTROLYTE REPLACEMENT PROTOCOL  1 Each Other PRN     HIDA Scan 2/7/17  Gallbladder emptying study was performed per protocol utilizing 5. 2mCi Tc-99 M  Choletec and 1.86 mcg CCK intravenously. There is normal tracer distribution  throughout the liver. Activity is noted in the gallbladder, common bile duct,  and small bowel. The gallbladder ejection fraction is 31% (normal greater than  or equal to 35%).      IMPRESSION: Abnormal gallbladder emptying study with an EF of only 31%.      Colonsocopy (2/17/17) - Dr. Margoth Drummond  Rectum: small internal hemorrhoids  Sigmoid: mild diverticulosis  Descending Colon: normal  Transverse Colon: normal  Ascending Colon: single, sessile 3-4 mm polyp in distal ascending colon - not removed in setting of Plavix  Cecum: non-bleeding AVM 2-3 mm in size - not ablated in setting of Plavix  Terminal Ileum: not intubated         IMPRESSION / PLAN:    1. CAD - severe native vessel disease, s/p PCI for  of RCA and left main with Impella support   Stable on ASA, clopidogrel, BB, and statin    2. Acute on chronic systolic heart failure (ICM with LVEF 5-10%) - Stage D, NYHA Class IV   Currently inotropic dependent, repeat echo with improved LVEF 20-24%   On IV milrinone 0.375 mcg/kg/min, bumex 2 mg bid, and spironolactone 25 mg daily    3. Paroxysmal atrial fibrillation   No warfarin due to gi bleeding   Maintaining SR on amiodarone    4. DM2 with Hga1c 13.5   On lantus, SSI, and glimepiride   Diabetic education   Accuchecks    5. Cellutitis   Resolved after treatment with cefazolin    6. CKD3   Serum creatinine improving on inotropic support and Impella   Continue diuresis    7. COPD with FEV1 51% predicted    8. Acalculous Cholecystitis   Completed course of IV zosyn     9. Hyponatremia   Improved with diuresis    10. Nicotine Addiction   Smoking cessation counseling   Nicotine patch - reduce dose to 14 mg/24 hour    11. CECILIA    Hgb 8.8, plt 246   No heparin    12. Anemia due to gi bleeding   One colonic AVM and one colonic polyp - no further therapy until off plavix    13. Hepatitis C   Hepatitis C RNA undetectable   Recheck Hep C RNA as outpatient to confirm negative results are not a false negative      Dorie Cortez MD, [facility] [place] [address] Director    [address] [place] [address], [address] [place] [address], [address] [place] [address]  Office [phone]  Fax [phone]  24 hour VAD/HF Pager: [phone]

## 2017-02-21 NOTE — PROGRESS NOTES
Problem: Falls - Risk of  Goal: *Absence of falls  Outcome: Progressing Towards Goal  Call bell within reach, personal belongings in reach, bed locked and in low position. Non skid footwear in placed. Goal: *Knowledge of fall prevention  Outcome: Progressing Towards Goal  Uses the call bell appropriately to call for assistance         Problem: Pressure Ulcer - Risk of  Goal: *Prevention of pressure ulcer  Outcome: Progressing Towards Goal  Q4H skin assessed, Turns self at appropriate intervals and blood glucose controlled         Bedside and Verbal shift change report given to Carolina Gilmore RN (oncoming nurse) by Man Mandujano RN (offgoing nurse). Report included the following information SBAR, Kardex, ED Summary, Procedure Summary, Intake/Output, MAR, Recent Results and Med Rec Status.

## 2017-02-21 NOTE — PROCEDURES
[address]  [phone]        M2A Capsule Endoscopy Procedure    NAME:  Daphne Aguilar. :   1950   MRN:   [id number]       Date/Time:  2017   Procedure Type: M2A Capsule  Indications:  Melena, anemia, normal EGD, colonoscopy with non-bleeding cecal AVM    Pre-operative Diagnosis: See indication above    Post-operative Diagnosis:  See findings below    : Alesia Richardson. Marcel Ho MD    Referring Provider: None    Anethesia/Sedation: none      Procedure Details   PLEASE REFER TO THE SCANNED REPORT FOR MORE DETAILS    Findings:     First duodenal image = 00:28:15  First ileocecal valve image= 06:43:00  First cecal image=06:44:00  Small Bowel Passage time: 06:16:00    No blood or high risk lesion was seen in small bowel. Preparation was adequate. Impression:            1. As above    Recommendations:  1. Colonoscopy off of Plavix to remove colon polyp and ablate cecal AVM  2. Monitor CBC  3. Advance diet  4. Will follow      Alesia Richardson.  Marcel Ho MD  2017  7:56 AM

## 2017-02-21 NOTE — PROGRESS NOTES
0730:  Bedside shift change report given to Lore Lay (oncoming nurse) by Tala Capps (offgoing nurse). Report included the following information SBAR, Kardex, MAR and Recent Results. 1930:  Bedside shift change report given to Chiquita Rangel (oncoming nurse) by Lore Lay (offgoing nurse). Report included the following information SBAR, Kardex, MAR and Recent Results.

## 2017-02-22 VITALS
SYSTOLIC BLOOD PRESSURE: 96 MMHG | TEMPERATURE: 98.2 F | DIASTOLIC BLOOD PRESSURE: 60 MMHG | HEART RATE: 81 BPM | OXYGEN SATURATION: 96 % | HEIGHT: 69 IN | WEIGHT: 168.65 LBS | BODY MASS INDEX: 24.98 KG/M2 | RESPIRATION RATE: 18 BRPM

## 2017-02-22 LAB
ALBUMIN SERPL BCP-MCNC: 2.9 G/DL (ref 3.5–5)
ALBUMIN/GLOB SERPL: 0.7 {RATIO} (ref 1.1–2.2)
ALP SERPL-CCNC: 99 U/L (ref 45–117)
ALT SERPL-CCNC: 21 U/L (ref 12–78)
ANION GAP BLD CALC-SCNC: 9 MMOL/L (ref 5–15)
AST SERPL W P-5'-P-CCNC: 14 U/L (ref 15–37)
BILIRUB SERPL-MCNC: 0.5 MG/DL (ref 0.2–1)
BUN SERPL-MCNC: 28 MG/DL (ref 6–20)
BUN/CREAT SERPL: 16 (ref 12–20)
CALCIUM SERPL-MCNC: 8.9 MG/DL (ref 8.5–10.1)
CHLORIDE SERPL-SCNC: 96 MMOL/L (ref 97–108)
CO2 SERPL-SCNC: 28 MMOL/L (ref 21–32)
CREAT SERPL-MCNC: 1.8 MG/DL (ref 0.7–1.3)
ERYTHROCYTE [DISTWIDTH] IN BLOOD BY AUTOMATED COUNT: 18.1 % (ref 11.5–14.5)
GLOBULIN SER CALC-MCNC: 4.4 G/DL (ref 2–4)
GLUCOSE BLD STRIP.AUTO-MCNC: 163 MG/DL (ref 65–100)
GLUCOSE BLD STRIP.AUTO-MCNC: 164 MG/DL (ref 65–100)
GLUCOSE SERPL-MCNC: 155 MG/DL (ref 65–100)
HCT VFR BLD AUTO: 28.6 % (ref 36.6–50.3)
HGB BLD-MCNC: 9 G/DL (ref 12.1–17)
INR PPP: 1.3 (ref 0.9–1.1)
MAGNESIUM SERPL-MCNC: 2.2 MG/DL (ref 1.6–2.4)
MCH RBC QN AUTO: 25.4 PG (ref 26–34)
MCHC RBC AUTO-ENTMCNC: 31.5 G/DL (ref 30–36.5)
MCV RBC AUTO: 80.6 FL (ref 80–99)
PLATELET # BLD AUTO: 282 K/UL (ref 150–400)
POTASSIUM SERPL-SCNC: 3.7 MMOL/L (ref 3.5–5.1)
PROT SERPL-MCNC: 7.3 G/DL (ref 6.4–8.2)
PROTHROMBIN TIME: 12.9 SEC (ref 9–11.1)
RBC # BLD AUTO: 3.55 M/UL (ref 4.1–5.7)
SERVICE CMNT-IMP: ABNORMAL
SERVICE CMNT-IMP: ABNORMAL
SODIUM SERPL-SCNC: 133 MMOL/L (ref 136–145)
WBC # BLD AUTO: 5 K/UL (ref 4.1–11.1)

## 2017-02-22 PROCEDURE — 74011250637 HC RX REV CODE- 250/637: Performed by: INTERNAL MEDICINE

## 2017-02-22 PROCEDURE — 74011250636 HC RX REV CODE- 250/636: Performed by: INTERNAL MEDICINE

## 2017-02-22 PROCEDURE — 74011250637 HC RX REV CODE- 250/637: Performed by: SPECIALIST

## 2017-02-22 PROCEDURE — 85027 COMPLETE CBC AUTOMATED: CPT | Performed by: PHYSICIAN ASSISTANT

## 2017-02-22 PROCEDURE — 74011250637 HC RX REV CODE- 250/637: Performed by: THORACIC SURGERY (CARDIOTHORACIC VASCULAR SURGERY)

## 2017-02-22 PROCEDURE — 74011250637 HC RX REV CODE- 250/637: Performed by: NURSE PRACTITIONER

## 2017-02-22 PROCEDURE — 80053 COMPREHEN METABOLIC PANEL: CPT | Performed by: PHYSICIAN ASSISTANT

## 2017-02-22 PROCEDURE — 74011636637 HC RX REV CODE- 636/637: Performed by: PHYSICIAN ASSISTANT

## 2017-02-22 PROCEDURE — 83735 ASSAY OF MAGNESIUM: CPT | Performed by: PHYSICIAN ASSISTANT

## 2017-02-22 PROCEDURE — 36415 COLL VENOUS BLD VENIPUNCTURE: CPT | Performed by: PHYSICIAN ASSISTANT

## 2017-02-22 PROCEDURE — 87521 HEPATITIS C PROBE&RVRS TRNSC: CPT | Performed by: INTERNAL MEDICINE

## 2017-02-22 PROCEDURE — 82962 GLUCOSE BLOOD TEST: CPT

## 2017-02-22 PROCEDURE — 85610 PROTHROMBIN TIME: CPT | Performed by: PHYSICIAN ASSISTANT

## 2017-02-22 RX ORDER — MILRINONE LACTATE 0.2 MG/ML
0.38 INJECTION, SOLUTION INTRAVENOUS CONTINUOUS
Qty: 100 ML | Refills: 1 | Status: SHIPPED | OUTPATIENT
Start: 2017-02-22 | End: 2017-07-25 | Stop reason: SDUPTHER

## 2017-02-22 RX ORDER — BUMETANIDE 2 MG/1
2 TABLET ORAL 2 TIMES DAILY
Qty: 60 TAB | Refills: 1 | Status: SHIPPED | OUTPATIENT
Start: 2017-02-22 | End: 2017-03-01 | Stop reason: SDUPTHER

## 2017-02-22 RX ORDER — AMIODARONE HYDROCHLORIDE 200 MG/1
200 TABLET ORAL EVERY 12 HOURS
Qty: 60 TAB | Refills: 1 | Status: SHIPPED | OUTPATIENT
Start: 2017-02-22 | End: 2017-04-19 | Stop reason: SDUPTHER

## 2017-02-22 RX ORDER — ASPIRIN 81 MG/1
81 TABLET ORAL DAILY
Qty: 30 TAB | Refills: 1 | Status: SHIPPED | OUTPATIENT
Start: 2017-02-22 | End: 2018-03-13

## 2017-02-22 RX ORDER — ATORVASTATIN CALCIUM 10 MG/1
10 TABLET, FILM COATED ORAL DAILY
Qty: 30 TAB | Refills: 1 | Status: SHIPPED | OUTPATIENT
Start: 2017-02-22 | End: 2017-04-19 | Stop reason: SDUPTHER

## 2017-02-22 RX ORDER — SPIRONOLACTONE 25 MG/1
25 TABLET ORAL DAILY
Qty: 30 TAB | Refills: 1 | Status: SHIPPED | OUTPATIENT
Start: 2017-02-22 | End: 2017-04-19 | Stop reason: SDUPTHER

## 2017-02-22 RX ORDER — CARVEDILOL 3.12 MG/1
3.12 TABLET ORAL 2 TIMES DAILY WITH MEALS
Qty: 60 TAB | Refills: 1 | Status: SHIPPED | OUTPATIENT
Start: 2017-02-22 | End: 2017-04-19 | Stop reason: SDUPTHER

## 2017-02-22 RX ORDER — IBUPROFEN 200 MG
1 TABLET ORAL DAILY
Qty: 30 PATCH | Refills: 1 | Status: SHIPPED | OUTPATIENT
Start: 2017-02-22 | End: 2017-03-24

## 2017-02-22 RX ORDER — LANOLIN ALCOHOL/MO/W.PET/CERES
400 CREAM (GRAM) TOPICAL DAILY
Qty: 30 TAB | Refills: 1 | Status: SHIPPED | OUTPATIENT
Start: 2017-02-22 | End: 2017-09-05 | Stop reason: ALTCHOICE

## 2017-02-22 RX ORDER — GABAPENTIN 100 MG/1
100 CAPSULE ORAL 2 TIMES DAILY
Qty: 60 CAP | Refills: 1 | Status: SHIPPED | OUTPATIENT
Start: 2017-02-22 | End: 2017-02-24 | Stop reason: DRUGHIGH

## 2017-02-22 RX ORDER — CLOPIDOGREL BISULFATE 75 MG/1
75 TABLET ORAL DAILY
Qty: 30 TAB | Refills: 1 | Status: SHIPPED | OUTPATIENT
Start: 2017-02-22 | End: 2017-04-19 | Stop reason: SDUPTHER

## 2017-02-22 RX ADMIN — SPIRONOLACTONE 25 MG: 25 TABLET, FILM COATED ORAL at 08:13

## 2017-02-22 RX ADMIN — Medication 10 ML: at 06:03

## 2017-02-22 RX ADMIN — Medication 400 MG: at 08:13

## 2017-02-22 RX ADMIN — GABAPENTIN 100 MG: 100 CAPSULE ORAL at 08:12

## 2017-02-22 RX ADMIN — ATORVASTATIN CALCIUM 10 MG: 10 TABLET, FILM COATED ORAL at 08:13

## 2017-02-22 RX ADMIN — AMIODARONE HYDROCHLORIDE 200 MG: 200 TABLET ORAL at 08:13

## 2017-02-22 RX ADMIN — Medication 81 MG: at 08:13

## 2017-02-22 RX ADMIN — CARVEDILOL 3.12 MG: 3.12 TABLET, FILM COATED ORAL at 08:13

## 2017-02-22 RX ADMIN — INSULIN LISPRO 8 UNITS: 100 INJECTION, SUSPENSION SUBCUTANEOUS at 18:40

## 2017-02-22 RX ADMIN — INSULIN LISPRO 10 UNITS: 100 INJECTION, SUSPENSION SUBCUTANEOUS at 08:12

## 2017-02-22 RX ADMIN — OXYCODONE HYDROCHLORIDE AND ACETAMINOPHEN 2 TABLET: 5; 325 TABLET ORAL at 07:01

## 2017-02-22 RX ADMIN — BUMETANIDE 2 MG: 1 TABLET ORAL at 08:13

## 2017-02-22 RX ADMIN — MILRINONE LACTATE 0.38 MCG/KG/MIN: 200 INJECTION, SOLUTION INTRAVENOUS at 02:00

## 2017-02-22 RX ADMIN — CLOPIDOGREL BISULFATE 75 MG: 75 TABLET, FILM COATED ORAL at 08:13

## 2017-02-22 NOTE — PROGRESS NOTES
0730:  Bedside shift change report given to [address] (oncoming nurse) by Christiano Nunez (offgoing nurse). Report included the following information SBAR, Kardex, MAR and Recent Results. I have reviewed   1830:  discharge instructions with the patient and spouse. The patient and spouse verbalized understanding. Discharge medications reviewed with patient and spouse and appropriate educational materials and side effects teaching were provided.

## 2017-02-22 NOTE — PROGRESS NOTES
[facility] Progress Note      NAME:  Fernanda Wang. :   1950   MRN:   [id number]   PCP:  None  CARD:   Dr. Theron Escamilla    Date:  2017     Fernanda Wang. is a 61 y.o. male with a who presented for further evaluation of severe CAD and systolic heart failure. Subjective:   He was initially seen at [facility] 3 years ago and told that he had heart failure with LVEF 30%. He was lost to follow up due to lack of insurance and has not been seen by a physician in the interim. He complains of progressive fatigue, NAVARRO, PND, and edema over the past year, prompting presentation to [facility]. He also complained of lower extremity bullae, weeping, and pain. He denied palpitations, presyncope, syncope, or chest pain. Past 24 hours:  Negative capsule study  Denies dyspnea  Ambulating in hallway    Objective:     Visit Vitals    BP 96/60 (BP 1 Location: Right arm, BP Patient Position: At rest)    Pulse 81    Temp 98.2 °F (36.8 °C)    Resp 18    Ht 5' 9\" (1.753 m)    Wt 76.5 kg (168 lb 10.4 oz)    SpO2 96%    BMI 24.91 kg/m2          General:  fatigued    HEENT: Normocephalic, EOMI, PERRLA, Hearing intact, trachea mid-line    Neck:  supple, no significant adenopathy, carotids upstroke normal bilaterally, no bruits    CVP:  5 cm      Heart:  Diminished PMI    Normal S1 and S2, S3 gallop    Murmur: 1/6 systolic murmur    Lungs: Diminished breath sounds left lower lung    Abdomen: soft, non-tender. Bowel sounds normal. +HSM, camera attached to abdomen    Extremity: No edema bilaterally    Neuro: Alert and oriented to person, place, and time; normal strength and tone. Normal symmetric reflexes.  Normal coordination and gait      1 -  0700  In: 7298 [P.O.:949; I.V.:244]  Out: 3080 [Urine:3080]  O2 Flow Rate (L/min): 2 l/min O2 Device: Room air  Temp (24hrs), Av.1 °F (36.7 °C), Min:97.8 °F (36.6 °C), Max:98.3 °F (36.8 °C)          Care Plan discussed with:    Comments Patient x    Family      RN x    Care Manager                    Consultant:          Past History:     Past Medical History:   Diagnosis Date    Cardiomyopathy ([address] 1/20/2017    A. Echo (1/19/17):  EF 5-10% with severe GHK,. Mildly dil LA. Mild TR. PASP 46.       Past Surgical History:   Procedure Laterality Date    COLONOSCOPY N/A 2/17/2017    COLONOSCOPY performed by Nader Simental. Anisa Calvo MD at [facility] ENDOSCOPY       Social History   Substance Use Topics    Smoking status: Not on file    Smokeless tobacco: Not on file    Alcohol use Not on file        History reviewed. No pertinent family history. Allergies: Allergies   Allergen Reactions    Heparin (Porcine) Unknown (comments)          Data Review:     CXR:   CXR Results  (Last 48 hours)    None        Echocardiogram:   Echo Results  (Last 48 hours)    None        No results found for this visit on 01/20/17. ECG:  EKG: ST with occasional PVC, NSIVCD, LAE    LABS:  Recent Results (from the past 24 hour(s))   GLUCOSE, POC    Collection Time: 02/21/17  5:42 PM   Result Value Ref Range    Glucose (POC) 176 (H) 65 - 100 mg/dL    Performed by [employer], POC    Collection Time: 02/21/17  9:12 PM   Result Value Ref Range    Glucose (POC) 140 (H) 65 - 100 mg/dL    Performed by [facility]    METABOLIC PANEL, COMPREHENSIVE    Collection Time: 02/22/17  3:16 AM   Result Value Ref Range    Sodium 133 (L) 136 - 145 mmol/L    Potassium 3.7 3.5 - 5.1 mmol/L    Chloride 96 (L) 97 - 108 mmol/L    CO2 28 21 - 32 mmol/L    Anion gap 9 5 - 15 mmol/L    Glucose 155 (H) 65 - 100 mg/dL    BUN 28 (H) 6 - 20 MG/DL    Creatinine 1.80 (H) 0.70 - 1.30 MG/DL    BUN/Creatinine ratio 16 12 - 20      GFR est AA 46 (L) >60 ml/min/1.73m2    GFR est non-AA 38 (L) >60 ml/min/1.73m2    Calcium 8.9 8.5 - 10.1 MG/DL    Bilirubin, total 0.5 0.2 - 1.0 MG/DL    ALT (SGPT) 21 12 - 78 U/L    AST (SGOT) 14 (L) 15 - 37 U/L    Alk.  phosphatase 99 45 - 117 U/L Protein, total 7.3 6.4 - 8.2 g/dL    Albumin 2.9 (L) 3.5 - 5.0 g/dL    Globulin 4.4 (H) 2.0 - 4.0 g/dL    A-G Ratio 0.7 (L) 1.1 - 2.2     MAGNESIUM    Collection Time: 02/22/17  3:16 AM   Result Value Ref Range    Magnesium 2.2 1.6 - 2.4 mg/dL   CBC W/O DIFF    Collection Time: 02/22/17  3:16 AM   Result Value Ref Range    WBC 5.0 4.1 - 11.1 K/uL    RBC 3.55 (L) 4.10 - 5.70 M/uL    HGB 9.0 (L) 12.1 - 17.0 g/dL    HCT 28.6 (L) 36.6 - 50.3 %    MCV 80.6 80.0 - 99.0 FL    MCH 25.4 (L) 26.0 - 34.0 PG    MCHC 31.5 30.0 - 36.5 g/dL    RDW 18.1 (H) 11.5 - 14.5 %    PLATELET 445 090 - 172 K/uL   PROTHROMBIN TIME + INR    Collection Time: 02/22/17  3:16 AM   Result Value Ref Range    INR 1.3 (H) 0.9 - 1.1      Prothrombin time 12.9 (H) 9.0 - 11.1 sec   GLUCOSE, POC    Collection Time: 02/22/17  6:56 AM   Result Value Ref Range    Glucose (POC) 164 (H) 65 - 100 mg/dL    Performed by Kristi Busatmante, POC    Collection Time: 02/22/17 11:01 AM   Result Value Ref Range    Glucose (POC) 163 (H) 65 - 100 mg/dL    Performed by Johnie Almeida        Results for Nhung Raza (MRN [id number]) as of 2/20/2017 16:43   Ref. Range 2/14/2017 16:29   Hepatitis A, IgM Latest Ref Range: NR   NONREACTIVE   Hepatitis B surface Ag Latest Units: Index 0.25   Hep B surface Ag Interp. Latest Ref Range: NEG   NEGATIVE   Hepatitis B core, IgM Latest Ref Range: NR   NONREACTIVE   Hepatitis C virus Ab Latest Units: Index >11.90   Hep C  virus Ab Interp. Latest Ref Range: NR   REACTIVE (A)   Hep C  virus Ab comment Latest Units:   Method used is Si. ..        All Micro Results     Procedure Component Value Units Date/Time    CULTURE, BLOOD, PAIRED [[id number]] Collected:  02/05/17 0322    Order Status:  Completed Specimen:  Blood Updated:  02/10/17 0542     Special Requests: NO SPECIAL REQUESTS        Culture result: NO GROWTH 5 DAYS       CULTURE, BODY FLUID Jennifer Trammell [[id number]] Collected:  01/26/17 1505    Order Status:  Completed Specimen:  Thoracentesis Updated:  01/30/17 1057     Special Requests: NO SPECIAL REQUESTS        GRAM STAIN OCCASIONAL  WBCS SEEN         NO ORGANISMS SEEN        Culture result: NO GROWTH 4 DAYS       CULTURE, Tyra Keys [[id number]] Collected:  01/25/17 2134    Order Status:  Completed Specimen:  Leg Updated:  01/27/17 1440     Special Requests: NO SPECIAL REQUESTS        GRAM STAIN RARE  WBCS SEEN         NO ORGANISMS SEEN        Culture result:       LIGHT  STREPTOCOCCI, BETA HEMOLYTIC GROUP C  . .. Penicillin and ampicillin are drugs of choice for treatment of beta-hemolytic streptococcal infections. Susceptibility testing of penicillins and beta-lactams approved by the FDA for treatment of beta-hemolytic streptococcal infections need not be performed routinely, because nonsusceptible isolates are extremely rare. CLSI 2012        MODERATE  BETTIE TROPICALIS           Abdominal Ultrasound 2/4/17  IMPRESSION: Distended gallbladder with gallbladder wall thickening,  pericholecystic fluid and tenderness to probe palpation during exam. Findings  are consistent with acalculus cholecystitis. Thoracentesis 1/26/17  Specimen Source   1: Pleural Fluid   CYTOLOGIC INTERPRETATION:   Scattered mesothelial cells with degenerative changes and mixed inflammatory cells   General Categorization   No cells diagnostic for malignancy   Specimen Adequacy   Satisfactory for evaluation       Cardiac MRI 1/31/17  IMPRESSION  1. Markedly dilated left ventricle with 3-D end-diastolic volume index to body  surface area of 153 mL/sq m. Mild eccentric left ventricular hypertrophy with  3-D mass index to body surface area of 85 g/sq m. Severe left ventricular  systolic dysfunction. Severe global hypokinesis with regional variation. 3-D  LVEF 10%. 2. Moderately dilated right ventricle by 3-D volumetric assessment. RV end  diastolic volume indexed to body surface area of 138 mL/sq m.  Moderate to severe  right ventricular systolic dysfunction. Global hypokinesis. 3-D RVEF 25%. 3. Apical a tethered mitral valve leaflets. Mild mitral regurgitation. 4. Moderately severe 3+ tricuspid regurgitation. 5. On LGE study, the anterior wall, anteroseptal wall, anteroapical wall, and  anterior lateral wall are largely viable without significant myocardial  infarction. The entire inferior wall and inferoseptal wall are completely viable  without any infarct. The base to mid inferolateral wall demonstrate a near  transmural greater than 75% thickness infarct with minimal or no viable  myocardium in this territory. The distal inferolateral wall, apical lateral wall  does not demonstrate any infarct and are largely viable. Based on the viability  imaging, the entire LAD territory is largely viable and should recover upon  revascularization. The entire RCA territory is largely viable and should recover  upon revascularization. The LCx territory demonstrate medium-size infarct with  limited viability. 6. Large left-sided pleural effusion with passive atelectasis of the left lung. 7. Dilated branch pulmonary arteries suggest pulmonary hypertension. 8. Markedly dilated left atrium measuring 59 x 55 mm. Moderately dilated right  atrium measuring 46 x 56 mm.     Medications reviewed:    Current Facility-Administered Medications   Medication Dose Route Frequency    bumetanide (BUMEX) tablet 2 mg  2 mg Oral BID    alteplase (CATHFLO) 1 mg in sterile water (preservative free) 1 mL injection  1 mg InterCATHeter PRN    sodium chloride (NS) flush 5-10 mL  5-10 mL InterCATHeter Q8H    insulin lispro protamine/insulin lispro (HUMALOG MIX 75/25) injection 10 Units  10 Units SubCUTAneous ACB    insulin lispro protamine/insulin lispro (HUMALOG MIX 75/25) injection 8 Units  8 Units SubCUTAneous ACD    glucose chewable tablet 16 g  4 Tab Oral PRN    dextrose (D50W) injection syrg 12.5-25 g  12.5-25 g IntraVENous PRN    glucagon (GLUCAGEN) injection 1 mg  1 mg IntraMUSCular PRN    insulin lispro (HUMALOG) injection   SubCUTAneous AC&HS    oxyCODONE-acetaminophen (PERCOCET) 5-325 mg per tablet 2 Tab  2 Tab Oral Q4H PRN    traMADol (ULTRAM) tablet  mg   mg Oral Q4H PRN    milrinone (PRIMACOR) 20 MG/100 ML D5W infusion  0.375 mcg/kg/min IntraVENous CONTINUOUS    nicotine (NICODERM CQ) 14 mg/24 hr patch 1 Patch  1 Patch TransDERmal DAILY    spironolactone (ALDACTONE) tablet 25 mg  25 mg Oral DAILY    bisacodyl (DULCOLAX) suppository 10 mg  10 mg Rectal DAILY PRN    magnesium hydroxide (MILK OF MAGNESIA) 400 mg/5 mL oral suspension 30 mL  30 mL Oral DAILY PRN    clopidogrel (PLAVIX) tablet 75 mg  75 mg Oral DAILY    docusate sodium (COLACE) capsule 100 mg  100 mg Oral DAILY PRN    gabapentin (NEURONTIN) capsule 100 mg  100 mg Oral BID    carvedilol (COREG) tablet 3.125 mg  3.125 mg Oral BID WITH MEALS    amiodarone (CORDARONE) tablet 200 mg  200 mg Oral Q12H    atorvastatin (LIPITOR) tablet 10 mg  10 mg Oral DAILY    acetaminophen (TYLENOL) tablet 650 mg  650 mg Oral Q6H PRN    aspirin delayed-release tablet 81 mg  81 mg Oral DAILY    magnesium oxide (MAG-OX) tablet 400 mg  400 mg Oral DAILY    diphenhydrAMINE (BENADRYL) capsule 25 mg  25 mg Oral QHS PRN    ELECTROLYTE REPLACEMENT PROTOCOL  1 Each Other PRN     HIDA Scan 2/7/17  Gallbladder emptying study was performed per protocol utilizing 5. 2mCi Tc-99 M  Choletec and 1.86 mcg CCK intravenously. There is normal tracer distribution  throughout the liver. Activity is noted in the gallbladder, common bile duct,  and small bowel. The gallbladder ejection fraction is 31% (normal greater than  or equal to 35%).      IMPRESSION: Abnormal gallbladder emptying study with an EF of only 31%.      Colonsocopy (2/17/17) - Dr. Pj Delarosa  Rectum: small internal hemorrhoids  Sigmoid: mild diverticulosis  Descending Colon: normal  Transverse Colon: normal  Ascending Colon: single, sessile 3-4 mm polyp in distal ascending colon - not removed in setting of Plavix  Cecum: non-bleeding AVM 2-3 mm in size - not ablated in setting of Plavix  Terminal Ileum: not intubated         IMPRESSION / PLAN:    1. CAD - severe native vessel disease, s/p PCI for  of RCA and left main with Impella support   Stable on ASA, clopidogrel, BB, and statin    2. Acute on chronic systolic heart failure (ICM with LVEF 5-10%) - Stage D, NYHA Class IV   Currently inotropic dependent, repeat echo with improved LVEF 20-24%   On IV milrinone 0.375 mcg/kg/min, bumex 2 mg bid, and spironolactone 25 mg daily   Discharge home today    3. Paroxysmal atrial fibrillation   No warfarin due to gi bleeding   Maintaining SR on amiodarone    4. DM2 with Hga1c 13.5   On lantus, SSI, and glimepiride   Diabetic education   Accuchecks    5. Cellutitis   Resolved after treatment with cefazolin    6. CKD3   Serum creatinine improving on inotropic support and Impella   Continue diuresis    7. COPD with FEV1 51% predicted    8. Acalculous Cholecystitis   Completed course of IV zosyn     9. Hyponatremia   Improved with diuresis    10. Nicotine Addiction   Smoking cessation counseling   Nicotine patch - reduce dose to 14 mg/24 hour    11. CECILIA    Hgb 8.8, plt 246   No heparin    12. Anemia due to gi bleeding   One colonic AVM and one colonic polyp - no further therapy until off plavix    13. Hepatitis C   Hepatitis C RNA undetectable   Recheck Hep C RNA as outpatient to confirm negative results are not a false negative    14. High Risk of SCD   Life Vest    Dorie Plascencia MD, [facility] [place] [address] Director     [facility] [address], [address] [place] [address], [address] [place] [address]  Office [phone]  Fax [phone]  24 hour VAD/HF Pager: [phone]

## 2017-02-22 NOTE — PROGRESS NOTES
Problem: Falls - Risk of  Goal: *Absence of falls  Outcome: Progressing Towards Goal  Up ad amelie with steady gait. Call bell and personal effects within reach. Bed locked and in low position. Non skid footwear in place. Goal: *Knowledge of fall prevention  Outcome: Progressing Towards Goal  Uses call bell appropriately to request assistance. Problem: Pressure Ulcer - Risk of  Goal: *Prevention of pressure ulcer  Outcome: Progressing Towards Goal  Turns self at appropriate intervals; skin assessed Q4H; blood glucose controlled        0730: Bedside and Verbal shift change report given to Yoanna Thorpe (oncoming nurse) by Bernadette Brewer (offgoing nurse). Report included the following information SBAR, Kardex, Procedure Summary, Intake/Output, MAR, Recent Results and Cardiac Rhythm NSR with BBB.

## 2017-02-22 NOTE — DIABETES MGMT
DTC Follow up/Progress Note    Recommendations/ Comments: Met with patient and wife to review diabetes material that has been taught over the last month. Reviewed monitoring, blood sugar goals, insulin dose and timing, and hypoglycemia. Also, due to lack of insurance, discussed purchasing generic insulin, meter and test strips. Patient without further questions and has DTC # for questions. Chart reviewed on Fern[facility]. .    Patient is a 61 y.o. male with newly diagnosed Type 2 Diabetes. A1c:   Lab Results   Component Value Date/Time    Hemoglobin A1c 13.5 01/19/2017 05:05 PM    Hemoglobin A1c 13.4 01/18/2017 11:11 PM       Recent Glucose Results:   Lab Results   Component Value Date/Time     (H) 02/22/2017 03:16 AM    GLUCPOC 163 (H) 02/22/2017 11:01 AM    GLUCPOC 164 (H) 02/22/2017 06:56 AM    GLUCPOC 140 (H) 02/21/2017 09:12 PM        Lab Results   Component Value Date/Time    Creatinine 1.80 02/22/2017 03:16 AM       Active Orders   Diet    DIET CARDIAC Regular; 2 GM NA (House Low NA)        PO intake:   Patient Vitals for the past 72 hrs:   % Diet Eaten   02/22/17 1310 100 %   02/22/17 0905 100 %   02/21/17 0915 100 %   02/20/17 1830 100 %   02/20/17 1419 100 %   02/20/17 0847 0 %       Current hospital DM medication:Humalog 75/25 10 units with breakfast, 8 units with dinner Humalog for correction, high sensitivity    Will continue to follow as needed.     Thank you  Kan Urrutia, MS, RN, CDE

## 2017-02-22 NOTE — DISCHARGE INSTRUCTIONS
[address] [address]                                                  [phone]      Name: Malissa Henriquez. Procedures: SVO2 swan placed via RFV  Impella placed via LFA  PCI performed via RFA      Successful MIKE pRCA: 2.5x38 Xience + 2.75x12 Xience overlapping. Successful MIKE ost left main: 4.0x12 Xience post-dil with 4.5x12 NC. By Dr. Lelia Gutierrez     Removal percutaneous ventricular   assist device (Impella pump) at separate and distinct session from   insertion (CPT CODE 69786) on 2/14/17 by Dr. Demetria Fonseca     Colonoscopy and EGT on 2/17/17 by Dr. Chivo Lay     Capsule Study 2/21/17        Discharge Date: No discharge date for patient encounter. MEDICATIONS:  Please see your After Visit Summary for a list of medications. INSTRUCTIONS:  1. NO SMOKING, NO ALCOHOL, NO DRUGS  2. Weigh yourself each morning. Call if you gain more than 2 pounds in a one day period or 5 pounds in a one week period. ACTIVITY  No restrictions         FOLLOW UP  1. You have a follow up appointment in the [place] [address] on Friday, February 24, 2016 at 11:00am.  Our address is [address] [place] [address], [address] [place] [address], [address] [place] [address]. Please contact [phone] if this appointment does not work for you  2. You will need to schedule a follow up appointment with Dr. Lelia Gutierrez - on March 6, 2017 at 1:40pm.  Please call [phone] if this doesn't work for you. 3. If you have any issues, please call [phone]  a. During office hours, call [phone]  b. After hours or on weekends, please page [phone]  4. Please contact your primary care physician to make sure your pneumococcal vaccine and flu vaccines are up to date. 5. PLEASE bring a list of all medications you are currently taking with you to your follow up appointments. Signature:___________________________________________________

## 2017-02-22 NOTE — PROGRESS NOTES
[facility]   [address] [facility], [facility] [address], [facility]  Phone: [phone]   [phone]       Nephrology Progress Note  Gwyn Shukla.     1950     [id number]  Date of Admission : 1/20/2017 02/22/17    CC: Follow up for CHACORTA on CKD    Assessment and Plan   CHACORTA on CKD:  - Cr fluctuating with diuresis but remaining overall stable  - ok for d/c from renal standpoint  - will set him up for follow up within a week after his d/c  - would cont current diuretics at d/c    Hypokalemia:  - K stable    Hyponatremia :  - combination of  CHF + SIADH from chronic alcoholism  - Na stable     Acute on Chronic Systolic CHF w/ severe CMP  - echo with EF 5-10%, severely dilated LV, severe TR  - Multivessel CAD : MRI showed viable myocardium   - s/p LAD and RCA stents 2/9 with Impella support  - on milrinone now    Anemia:  - hgb stable  - transfused 1 unit on 2/15  - EGD/Colon with no obvious bleeding - polyp in colon that was not removed  - capsule study normal     Interval History:  Seen and examined. Possible d/c today. Cr stable. Feeling ok. No n/v/d, cp or sob reported. .      Review of Systems: Pertinent items are noted in HPI.     Current Medications:   Current Facility-Administered Medications   Medication Dose Route Frequency    bumetanide (BUMEX) tablet 2 mg  2 mg Oral BID    alteplase (CATHFLO) 1 mg in sterile water (preservative free) 1 mL injection  1 mg InterCATHeter PRN    sodium chloride (NS) flush 5-10 mL  5-10 mL InterCATHeter Q8H    insulin lispro protamine/insulin lispro (HUMALOG MIX 75/25) injection 10 Units  10 Units SubCUTAneous ACB    insulin lispro protamine/insulin lispro (HUMALOG MIX 75/25) injection 8 Units  8 Units SubCUTAneous ACD    glucose chewable tablet 16 g  4 Tab Oral PRN    dextrose (D50W) injection syrg 12.5-25 g  12.5-25 g IntraVENous PRN    glucagon (GLUCAGEN) injection 1 mg  1 mg IntraMUSCular PRN    insulin lispro (HUMALOG) injection SubCUTAneous AC&HS    oxyCODONE-acetaminophen (PERCOCET) 5-325 mg per tablet 2 Tab  2 Tab Oral Q4H PRN    traMADol (ULTRAM) tablet  mg   mg Oral Q4H PRN    milrinone (PRIMACOR) 20 MG/100 ML D5W infusion  0.375 mcg/kg/min IntraVENous CONTINUOUS    nicotine (NICODERM CQ) 14 mg/24 hr patch 1 Patch  1 Patch TransDERmal DAILY    spironolactone (ALDACTONE) tablet 25 mg  25 mg Oral DAILY    bisacodyl (DULCOLAX) suppository 10 mg  10 mg Rectal DAILY PRN    magnesium hydroxide (MILK OF MAGNESIA) 400 mg/5 mL oral suspension 30 mL  30 mL Oral DAILY PRN    clopidogrel (PLAVIX) tablet 75 mg  75 mg Oral DAILY    docusate sodium (COLACE) capsule 100 mg  100 mg Oral DAILY PRN    gabapentin (NEURONTIN) capsule 100 mg  100 mg Oral BID    carvedilol (COREG) tablet 3.125 mg  3.125 mg Oral BID WITH MEALS    amiodarone (CORDARONE) tablet 200 mg  200 mg Oral Q12H    atorvastatin (LIPITOR) tablet 10 mg  10 mg Oral DAILY    acetaminophen (TYLENOL) tablet 650 mg  650 mg Oral Q6H PRN    aspirin delayed-release tablet 81 mg  81 mg Oral DAILY    magnesium oxide (MAG-OX) tablet 400 mg  400 mg Oral DAILY    diphenhydrAMINE (BENADRYL) capsule 25 mg  25 mg Oral QHS PRN    ELECTROLYTE REPLACEMENT PROTOCOL  1 Each Other PRN      Allergies   Allergen Reactions    Heparin (Porcine) Unknown (comments)       Objective:  Vitals:    Vitals:    02/21/17 2029 02/21/17 2339 02/22/17 0330 02/22/17 0757   BP:  102/62 124/76 119/65   Pulse: 84 81 86 86   Resp: 16  16 18   Temp:  98.2 °F (36.8 °C) 97.8 °F (36.6 °C) 98.3 °F (36.8 °C)   SpO2:  97% 99% 97%   Weight:   76.5 kg (168 lb 10.4 oz)    Height:         Intake and Output:     02/20 1901 - 02/22 0700  In: 1193 [P.O.:949; I.V.:244]  Out: 3080 [Urine:3080]    Physical Examination:  General: In NAD  Resp:  Lungs CTA  CV:  RRR,  no murmur or rub,+ LE edema  GI:  Soft, NT, + Bowel sounds,  Neurologic:  Non focal  Skin:  RLE cellulitis - resolving   Ext                   Edema +,      []    High complexity decision making was performed  []    Patient is at high-risk of decompensation with multiple organ involvement    Lab Data Personally Reviewed: I have reviewed all the pertinent labs, microbiology data and radiology studies during assessment. Recent Labs      02/22/17 0316 02/21/17   0303  02/20/17   0400   NA  133*  133*  132*   K  3.7  3.9  3.5   CL  96*  96*  94*   CO2  28  26  30   GLU  155*  132*  175*   BUN  28*  29*  27*   CREA  1.80*  1.82*  1.66*   CA  8.9  8.6  9.0   MG  2.2  2.2  2.1   ALB  2.9*  2.9*  3.1*   SGOT  14*  16  18   ALT  21  20  21   INR  1.3*  1.3*  1.3*     Recent Labs      02/22/17 0316 02/21/17   0303  02/20/17   0400   WBC  5.0  5.5  5.5   HGB  9.0*  8.6*  8.8*   HCT  28.6*  27.4*  27.8*   PLT  282  262  246     No results found for: SDES  Lab Results   Component Value Date/Time    Culture result: NO GROWTH 5 DAYS 02/05/2017 03:22 AM    Culture result: NO GROWTH 4 DAYS 01/26/2017 03:05 PM    Culture result:  01/25/2017 09:34 PM     LIGHT  STREPTOCOCCI, BETA HEMOLYTIC GROUP C  . .. Penicillin and ampicillin are drugs of choice for treatment of beta-hemolytic streptococcal infections. Susceptibility testing of penicillins and beta-lactams approved by the FDA for treatment of beta-hemolytic streptococcal infections need not be performed routinely, because nonsusceptible isolates are extremely rare.  CLSI 2012      Culture result: MODERATE  CANDIDA TROPICALIS   01/25/2017 09:34 PM    Culture result: NO SIGNIFICANT GROWTH 01/19/2017 01:35 AM     Recent Results (from the past 24 hour(s))   GLUCOSE, POC    Collection Time: 02/21/17 11:25 AM   Result Value Ref Range    Glucose (POC) 224 (H) 65 - 100 mg/dL    Performed by [address] [place] [address], POC    Collection Time: 02/21/17  5:42 PM   Result Value Ref Range    Glucose (POC) 176 (H) 65 - 100 mg/dL    Performed by Rosanna Pinedo, POC    Collection Time: 02/21/17  9:12 PM   Result Value Ref Range    Glucose (POC) 140 (H) 65 - 100 mg/dL    Performed by [facility]    METABOLIC PANEL, COMPREHENSIVE    Collection Time: 02/22/17  3:16 AM   Result Value Ref Range    Sodium 133 (L) 136 - 145 mmol/L    Potassium 3.7 3.5 - 5.1 mmol/L    Chloride 96 (L) 97 - 108 mmol/L    CO2 28 21 - 32 mmol/L    Anion gap 9 5 - 15 mmol/L    Glucose 155 (H) 65 - 100 mg/dL    BUN 28 (H) 6 - 20 MG/DL    Creatinine 1.80 (H) 0.70 - 1.30 MG/DL    BUN/Creatinine ratio 16 12 - 20      GFR est AA 46 (L) >60 ml/min/1.73m2    GFR est non-AA 38 (L) >60 ml/min/1.73m2    Calcium 8.9 8.5 - 10.1 MG/DL    Bilirubin, total 0.5 0.2 - 1.0 MG/DL    ALT (SGPT) 21 12 - 78 U/L    AST (SGOT) 14 (L) 15 - 37 U/L    Alk. phosphatase 99 45 - 117 U/L    Protein, total 7.3 6.4 - 8.2 g/dL    Albumin 2.9 (L) 3.5 - 5.0 g/dL    Globulin 4.4 (H) 2.0 - 4.0 g/dL    A-G Ratio 0.7 (L) 1.1 - 2.2     MAGNESIUM    Collection Time: 02/22/17  3:16 AM   Result Value Ref Range    Magnesium 2.2 1.6 - 2.4 mg/dL   CBC W/O DIFF    Collection Time: 02/22/17  3:16 AM   Result Value Ref Range    WBC 5.0 4.1 - 11.1 K/uL    RBC 3.55 (L) 4.10 - 5.70 M/uL    HGB 9.0 (L) 12.1 - 17.0 g/dL    HCT 28.6 (L) 36.6 - 50.3 %    MCV 80.6 80.0 - 99.0 FL    MCH 25.4 (L) 26.0 - 34.0 PG    MCHC 31.5 30.0 - 36.5 g/dL    RDW 18.1 (H) 11.5 - 14.5 %    PLATELET 125 796 - 476 K/uL   PROTHROMBIN TIME + INR    Collection Time: 02/22/17  3:16 AM   Result Value Ref Range    INR 1.3 (H) 0.9 - 1.1      Prothrombin time 12.9 (H) 9.0 - 11.1 sec   GLUCOSE, POC    Collection Time: 02/22/17  6:56 AM   Result Value Ref Range    Glucose (POC) 164 (H) 65 - 100 mg/dL    Performed by Arnie Khan            I have reviewed the flowsheets. Chart and Pertinent Notes have been reviewed. No change in PMH ,family and social history from Consult note.       Rocío Ly MD

## 2017-02-22 NOTE — PROGRESS NOTES
Infectious Diseases Progress Note    Antibiotic Summary:  Levaquin  --   Vancomycin  --   Ancef    -- 2/3  Keflex   2/3 --   Zosyn   --       Subjective:     He has had a good day with no abd pain, N, V, SOB. Objective:     Vitals:   Visit Vitals    /71 (BP 1 Location: Right arm, BP Patient Position: Head of bed elevated (Comment degrees))    Pulse 84    Temp 97.9 °F (36.6 °C)    Resp 16    Ht 5' 9\" (1.753 m)    Wt 76.8 kg (169 lb 5 oz)    SpO2 98%    BMI 25 kg/m2        Tmax:  Temp (24hrs), Av.8 °F (36.6 °C), Min:97.5 °F (36.4 °C), Max:97.9 °F (36.6 °C)      Exam:  General appearance: alert, no distress  Lungs: clear  Heart: RRR  Abdomen: nontender  Left leg: cellulitis resolved  Right leg: cellulitis resolved    IV Lines: Left PICC inserted     Labs:    Recent Labs      17   0303  17   0400  17   0348   WBC  5.5  5.5  5.5   HGB  8.6*  8.8*  8.6*   PLT  262  246  222   BUN  29*  27*  35*   CREA  1.82*  1.66*  1.99*   TBILI  0.5  0.6  0.4   SGOT  16  18  16   AP  101  105  101     Culture right leg ulcers  = group C Streptococcus    Assessment:     1. Bilateral venous stasis disease of the LEs +/- cellulitis: Resolved     2. RUQ tenderness and abnormal GB US: Clinically, he says the symptoms have been for months or even years. Remains on Zosyn. HIDA shows visualization of the GB with mildly decreased EF of 31%. The pain has resolved for the first time in \"5 years\"     3. OHD -- IHD/CAD; CHF; pulm HTN     4. CRF with acute exacerbation     5. NIDDM -- new diagnosis     6. Probable COPD -- heavy smoking since age 7      9. Anemia -- HC/MC -- positive family history of colon cancer -- may need GI W/U    Plan:     1.  Doing well off antibiotics; note plans for possible discharge      Carlos Enrique Snow MD

## 2017-02-22 NOTE — PROGRESS NOTES
Advanced Heart Failure Progress Note      Admit Date: 1/20/2017       Procedures:  SVO2 swan placed via RFV  Impella placed via LFA  PCI performed via RFA     Successful MIKE pRCA: 2.5x38 Xience + 2.75x12 Xience overlapping. Successful MIKE ost left main: 4.0x12 Xience post-dil with 4.5x12 NC. By Dr. Paul Faulkner    Removal percutaneous ventricular   assist device (Impella pump) at separate and distinct session from   insertion (CPT CODE 43682) on 2/14/17 by Dr. Radha Roberson    Colonoscopy and EGT on 2/17/17 by Dr. Lia Alcantar Study 2/21/17        Subjective:     Pt  Eating breakfast - denies SOB. Ready to go home. Remains on Milrinone 0.375 mcg/kg/min. No issues w/ GI bleeding. IMPRESSION/Plan:   s/p PCI w/ Impella support - On ASA and Plavix. Cont. low dose Coreg,  No ACEi d/t renal dysfunction. Continue statin. Will determine timing of f/u with cardiology as outpatient. Acute on chronic systolic heart failure (ICM) - Stage D, NYHA Class IV - discharge on  Milrinone   0.375 mcg/kg/min - pt and family need training on infusion pump, arranging for Life Vest - pt needs GDMT for 90 days and if EF remains low will proceeds with ICD. Cont. Current dose of Bumex at 2 mg BID and low dose Coreg. No ACEi d/t renal insufficiency. Plan to [facility] today if Life Vest can be arranged. As outpatient continue daily weights, low sodium diet and fluid restriction to 2 liters daily. Stressed importance of no drinking, smoking or illegal drug use. Chronic Anemia - Hg stable - no further bleeding. Pt and family aware he will need f/u with GI as outpatient. cauterizing of AVM and polyp removal at some point as outpatient - GI would like to do off Plavix and we are unable to stop Plavix at this time d/t recent stents. Pt will need to f/u w/ GI as outpatient.        +Hep C Ab - HCV RNA negative - needs one more negative HCV RNA to confirm no Hep C infection per Dr. Sherrie Sebastian     Acalculous Cholecystitis - resolved treated w/ Zosyn     L pleural effusion - stable, improved on last CXR. H/O PAF - no recent AF - no anticoagulation d/t anemia and GI bleeding    CECILIA - REJI positive, platelets good - close f/u NO HEPARIN     Hyponatremia - stable, cont. Current dose of diuretics     Diabetes Mellitus - continue current treatments - Appreciate  DTC input.      Cellulitis - resolved     H/o Tobacco abuse - smoking cessation, nicotine patch.     CHACORTA on CKD3 - stable, renal following, cont.  Bumex and current dose                 Objective:   Vitals:  Patient Vitals for the past 12 hrs:   Temp Pulse Resp BP SpO2   02/22/17 1100 98.2 °F (36.8 °C) 81 18 96/60 96 %   02/22/17 0757 98.3 °F (36.8 °C) 86 18 119/65 97 %   02/22/17 0330 97.8 °F (36.6 °C) 86 16 124/76 99 %     EKG: sinus tach    Admission Weight: Last Weight   Weight: 178 lb 2.1 oz (80.8 kg) Weight: 167 lb 15.9 oz (76.2 kg)     Intake / Output / Drain:    Intake/Output Summary (Last 24 hours) at 02/22/17 1350  Last data filed at 02/22/17 1107   Gross per 24 hour   Intake           534.25 ml   Output             2150 ml   Net         -1615.75 ml         EXAM:    HEENT:  Anicteric, EOMI       CVS:  S1/S2        LUNGS: slightly decreased L base - otherwise clear                  ABD:  +BS, NT/ND                  EXT:  Trace-1+ b/l LE edema, b/l TEDs in place                  Neuro:  No obvious deficits    Labs:   Lab Results   Component Value Date/Time    Sodium 133 02/22/2017 03:16 AM    Potassium 3.7 02/22/2017 03:16 AM    Chloride 96 02/22/2017 03:16 AM    CO2 28 02/22/2017 03:16 AM    Anion gap 9 02/22/2017 03:16 AM    Glucose 155 02/22/2017 03:16 AM    BUN 28 02/22/2017 03:16 AM    Creatinine 1.80 02/22/2017 03:16 AM    BUN/Creatinine ratio 16 02/22/2017 03:16 AM    GFR est AA 46 02/22/2017 03:16 AM    GFR est non-AA 38 02/22/2017 03:16 AM    Calcium 8.9 02/22/2017 03:16 AM     Lab Results   Component Value Date/Time    WBC 5.0 02/22/2017 03:16 AM    HGB 9.0 02/22/2017 03:16 AM HCT 28.6 02/22/2017 03:16 AM    PLATELET 049 05/69/3925 03:16 AM    MCV 80.6 02/22/2017 03:16 AM     Pt seen with Dr. Ramya Brennan and Dr. Lulú Gallardo By: Pee Kemp, [facility]    Saw patient, agree with above  Risk of morbidity and mortality - high  Medical decision making - high complexity

## 2017-02-22 NOTE — PROGRESS NOTES
[address]  [address], [address]       GI PROGRESS NOTE  Yoon Lundberg, [facility]  [phone] office  [phone] NP in-hospital cell phone M-F until 4:30  After 5pm or on weekends, please call  for physician on call      NAME: Estil Antonetta Peabody. :  1950   MRN:  [id number]       Subjective:     No complaints    Objective:     VITALS:   Last 24hrs VS reviewed since prior progress note. Most recent are:  Visit Vitals    BP 96/60 (BP 1 Location: Right arm, BP Patient Position: At rest)    Pulse 81    Temp 98.2 °F (36.8 °C)    Resp 18    Ht 5' 9\" (1.753 m)    Wt 76.5 kg (168 lb 10.4 oz)    SpO2 96%    BMI 24.91 kg/m2       PHYSICAL EXAM:  CONST: Chronically-ill male in bed in no acute distress.  Pale  NEURO: alert and oriented x 3  HEENT: EOMI, no scleral icterus  LUNGS: clear to ausculation, (-) wheeze  CARD: regular rate and rhythm, S1 S2  ABD: soft, no tenderness, no rebound, bowel sounds (+) all 4 quadrants, no masses, non distended  EXT: no edema, warm  PSYCH: full, not anxious    Lab Data Reviewed:     Recent Results (from the past 24 hour(s))   GLUCOSE, POC    Collection Time: 17  5:42 PM   Result Value Ref Range    Glucose (POC) 176 (H) 65 - 100 mg/dL    Performed by Irene 44, POC    Collection Time: 17  9:12 PM   Result Value Ref Range    Glucose (POC) 140 (H) 65 - 100 mg/dL    Performed by Lancaster Sol    METABOLIC PANEL, COMPREHENSIVE    Collection Time: 17  3:16 AM   Result Value Ref Range    Sodium 133 (L) 136 - 145 mmol/L    Potassium 3.7 3.5 - 5.1 mmol/L    Chloride 96 (L) 97 - 108 mmol/L    CO2 28 21 - 32 mmol/L    Anion gap 9 5 - 15 mmol/L    Glucose 155 (H) 65 - 100 mg/dL    BUN 28 (H) 6 - 20 MG/DL    Creatinine 1.80 (H) 0.70 - 1.30 MG/DL    BUN/Creatinine ratio 16 12 - 20      GFR est AA 46 (L) >60 ml/min/1.73m2    GFR est non-AA 38 (L) >60 ml/min/1.73m2    Calcium 8.9 8.5 - 10.1 MG/DL    Bilirubin, total 0.5 0.2 - 1.0 MG/DL    ALT (SGPT) 21 12 - 78 U/L    AST (SGOT) 14 (L) 15 - 37 U/L    Alk. phosphatase 99 45 - 117 U/L    Protein, total 7.3 6.4 - 8.2 g/dL    Albumin 2.9 (L) 3.5 - 5.0 g/dL    Globulin 4.4 (H) 2.0 - 4.0 g/dL    A-G Ratio 0.7 (L) 1.1 - 2.2     MAGNESIUM    Collection Time: 02/22/17  3:16 AM   Result Value Ref Range    Magnesium 2.2 1.6 - 2.4 mg/dL   CBC W/O DIFF    Collection Time: 02/22/17  3:16 AM   Result Value Ref Range    WBC 5.0 4.1 - 11.1 K/uL    RBC 3.55 (L) 4.10 - 5.70 M/uL    HGB 9.0 (L) 12.1 - 17.0 g/dL    HCT 28.6 (L) 36.6 - 50.3 %    MCV 80.6 80.0 - 99.0 FL    MCH 25.4 (L) 26.0 - 34.0 PG    MCHC 31.5 30.0 - 36.5 g/dL    RDW 18.1 (H) 11.5 - 14.5 %    PLATELET 915 429 - 949 K/uL   PROTHROMBIN TIME + INR    Collection Time: 02/22/17  3:16 AM   Result Value Ref Range    INR 1.3 (H) 0.9 - 1.1      Prothrombin time 12.9 (H) 9.0 - 11.1 sec   GLUCOSE, POC    Collection Time: 02/22/17  6:56 AM   Result Value Ref Range    Glucose (POC) 164 (H) 65 - 100 mg/dL    Performed by Kristi Bustamante, POC    Collection Time: 02/22/17 11:01 AM   Result Value Ref Range    Glucose (POC) 163 (H) 65 - 100 mg/dL    Performed by Malini Cramer          ________________________________________________________________________       Assessment:   · Anemia: all GI work up negative; no signs of active bleeding. · Colon polyp: colon polyp and cecal AVM found. Will need outpatient colon to do intervention off Plavix.      Patient Active Problem List   Diagnosis Code    Acute systolic (congestive) heart failure (HCC) I50.21    Bilateral lower extremity edema R60.0    Shortness of breath R06.02    Acute renal failure (ARF) ([facility]) N17.9    Hyperglycemia due to type 2 diabetes mellitus ([facility]) E11.65    Venous stasis dermatitis of both lower extremities I83.11, I83.12    Hypoxia R09.02    Pulmonary edema J81.1    Sinus tachycardia R00.0    Ischemic cardiomyopathy E32.2    Systolic heart failure (HCC) I50.20 Plan:   · GI will sign off  · F/u with office in 3 months. · Needs cardiac tune up and treatment; CY is not urgent. Signed By: Dianelys Cardenas. Roberto Torres NP     2/22/2017  3:06 PM           GI Attending: Agree with above. Plan for colonoscopy when patient can hold Plavix for 5 days. Evangelist Sears MD

## 2017-02-22 NOTE — PROGRESS NOTES
LifeVest authorization received; fitting to occur at 5:00pm for the patient to be discharged to home. Patient, his wife and sister-in-law completed inotrope [facility] provide skilled nursing services. Patient signed choice letter on this date. Patient completed deborah application for [facility] [employer] ([address]) and given 30 day supply of medications. Advised to stick to generic medications at [facility] for future refills.  will continue to follow the patient at [facility].     Dima Woodward, MSW, LCSW    Clinical    Jennifer Roman [phone]

## 2017-02-23 ENCOUNTER — HOME CARE VISIT (OUTPATIENT)
Dept: SCHEDULING | Facility: HOME HEALTH | Age: 67
End: 2017-02-23

## 2017-02-23 VITALS
DIASTOLIC BLOOD PRESSURE: 74 MMHG | RESPIRATION RATE: 18 BRPM | OXYGEN SATURATION: 99 % | TEMPERATURE: 99.1 F | SYSTOLIC BLOOD PRESSURE: 110 MMHG | HEART RATE: 87 BPM

## 2017-02-23 PROCEDURE — 400013 HH SOC

## 2017-02-23 PROCEDURE — A6212 FOAM DRG <=16 SQ IN W/BORDER: HCPCS

## 2017-02-23 PROCEDURE — G0299 HHS/HOSPICE OF RN EA 15 MIN: HCPCS

## 2017-02-24 ENCOUNTER — TELEPHONE (OUTPATIENT)
Dept: CARDIOLOGY | Age: 67
End: 2017-02-24

## 2017-02-24 ENCOUNTER — OFFICE VISIT (OUTPATIENT)
Dept: CARDIOLOGY CLINIC | Age: 67
End: 2017-02-24

## 2017-02-24 VITALS
BODY MASS INDEX: 24.05 KG/M2 | DIASTOLIC BLOOD PRESSURE: 76 MMHG | RESPIRATION RATE: 18 BRPM | WEIGHT: 168 LBS | HEIGHT: 70 IN | SYSTOLIC BLOOD PRESSURE: 110 MMHG | HEART RATE: 84 BPM

## 2017-02-24 DIAGNOSIS — E11.40 CONTROLLED TYPE 2 DIABETES MELLITUS WITH DIABETIC NEUROPATHY, WITHOUT LONG-TERM CURRENT USE OF INSULIN (HCC): ICD-10-CM

## 2017-02-24 DIAGNOSIS — I50.41 ACUTE COMBINED SYSTOLIC AND DIASTOLIC ACC/AHA STAGE C CONGESTIVE HEART FAILURE (HCC): ICD-10-CM

## 2017-02-24 DIAGNOSIS — I25.5 ISCHEMIC CARDIOMYOPATHY: Primary | ICD-10-CM

## 2017-02-24 DIAGNOSIS — E13.40 NEUROPATHY DUE TO SECONDARY DIABETES (HCC): ICD-10-CM

## 2017-02-24 DIAGNOSIS — K92.2 LOWER GI BLEEDING: ICD-10-CM

## 2017-02-24 LAB — HCV RNA SERPL QL NAA+PROBE: NEGATIVE

## 2017-02-24 RX ORDER — GABAPENTIN 300 MG/1
300 CAPSULE ORAL 3 TIMES DAILY
Qty: 90 CAP | Refills: 3 | Status: SHIPPED | OUTPATIENT
Start: 2017-02-24 | End: 2017-02-24

## 2017-02-24 RX ORDER — GABAPENTIN 300 MG/1
300 CAPSULE ORAL 3 TIMES DAILY
Qty: 90 CAP | Refills: 5 | Status: SHIPPED | OUTPATIENT
Start: 2017-02-24 | End: 2017-03-06 | Stop reason: DRUGHIGH

## 2017-02-24 RX ORDER — GABAPENTIN 300 MG/1
300 CAPSULE ORAL 3 TIMES DAILY
Qty: 90 CAP | Refills: 5 | Status: SHIPPED | OUTPATIENT
Start: 2017-02-24 | End: 2017-02-24 | Stop reason: SDUPTHER

## 2017-02-24 NOTE — PATIENT INSTRUCTIONS
1. Increase neurontin to 100 mg three times a day x 1 day then 200 mg three times a day x 2 days then 300 mg three times a day  2. Take tylenol 500 mg, 2 tablets every 6 hours as needed for pain  3. Start entresto 24/26 mg, 1 tablet twice daily  4. Blood work next week  5.  Follow up in the Advanced Heart Failure clinic next week

## 2017-02-24 NOTE — PROGRESS NOTES
[facility] Consultation Note      NAME:  Angelo Urrutia. :   1950   MRN:   [id number]   PCP:  None    Date:  2017     IMPRESSION/PLAN:  1. CAD s/p PCI ( of RCA, Left main): ASA, clopidogrel, BB, statin     2. ICM (LVEF 10%): on IV milrinone, carvedilol, spironolactone, bumex        Start entresto 24/26 mg bid    3. PAF: maintaining SR on amiodarone, no anticoagulation d/t gi bleeding    4. Lower GI Bleed: colonic polyp and AVM, no melena since hospital discharge    5. Nicotine Addiction: abstaining, continue nicotine patch 14 mg/hr    6. Diabetes: on insulin    7. Diabetic neuropathy: increase neurontin to 300 mg tid    8. CKD3: stable    9. Hepatitis C: undetectable viral load        HPI  61year old male with a history of CAD s/p PCI to left main and  of RCA 0n 17,  ICM (LVEF 10%), PAF, nicotine addiction, Hep C (undetectable viral load), gi bleed with colonic polyp and AVM, who presents to clinic for follow up after a recent hospital admission for ADHF and treatment with Impella supported PCIs. He was discharged home on IV milrinone and continues to have orthopnea requiring him to sleep in a recliner. He denies any increase in his baseline dyspnea and worsening edema. He continues to have bilateral leg pain (6/10 intensity) and neck pain (6/10). He is compliant with wearing his LifeVest.          Subjective:   Cardiac ROS: Patient denies any current exertional chest pain, dyspnea at rest, palpitations, syncope. He admits to orthopnea, mild edema, and paroxysmal nocturnal dyspnea. General Review of Systems: No nausea, indigestion, vomiting, pain, cough, sputum. No bleeding. No new neurological symptoms. Taking po. Appetite normal. All other systems are negative except as noted in the HPI.         Objective:     Visit Vitals    /76    Pulse 84    Resp 18    Ht 5' 10\" (1.778 m)    Wt 168 lb (76.2 kg)    BMI 24.11 kg/m2 General:  alert, cooperative, no distress    HEENT: Normocephalic, EOMI, PERRLA, Hearing intact, trachea mid-line    Neck:  supple, no significant adenopathy, carotids upstroke normal bilaterally, no bruits    CVP:  8 cm  ( + ) HJR    Heart:  Diminished PMI    No normal inspection and palpation, prominent apical impulse    regular rate and rhythm, S1, S2 normal, S3 gallop    Murmur: Grade 2/6 sysolic murmur    Lungs: normal percussion bilaterally, diminished breath sounds bilaterally    Abdomen: soft, non-tender. Bowel sounds normal. Mild hepatomegaly    Extremity: edema 1+ bilaterally    Neuro: Alert and oriented to person, place, and time; normal strength and tone. Normal symmetric reflexes. Normal coordination and gait             Past History:     Past Medical History:   Diagnosis Date    Cardiomyopathy ([address] 1/20/2017    A. Echo (1/19/17):  EF 5-10% with severe GHK,. Mildly dil LA. Mild TR. PASP 46.       Past Surgical History:   Procedure Laterality Date    COLONOSCOPY N/A 2/17/2017    COLONOSCOPY performed by Jolene Walker MD at [facility] ENDOSCOPY       Social History   Substance Use Topics    Smoking status: Former Smoker     Packs/day: 2.00     Years: 45.00     Quit date: 1/20/2017    Smokeless tobacco: Not on file    Alcohol use No        No family history on file. Allergies: Allergies   Allergen Reactions    Heparin (Porcine) Unknown (comments)        Data Review:       Echocardiogram (2/14/17): Left ventricle: The ventricle was moderately to severely dilated. Systolic  function was severely reduced. Ejection fraction was estimated to be 10 %. There was severe diffuse hypokinesis. There was moderate spontaneous echo  contrast, indicative of stasis. Ventricular septum: There was moderate paradoxical motion. Left atrium: The atrium was dilated. Mitral valve: There was moderate regurgitation. Tricuspid valve: There was mild to moderate regurgitation.     Pericardium: There was a moderate-sized left pleural effusion      ECG:   Sinus rhythm with fusion complexes   Possible Left atrial enlargement   Nonspecific intraventricular block   Cannot rule out Anterior infarct , age undetermined   When compared with ECG of 23-JAN-2017 10:13,   fusion complexes are now present   QRS duration has increased   Nonspecific T wave abnormality has replaced inverted T waves in Inferior   leads     CMP:   Lab Results   Component Value Date/Time    Glucose 155 02/22/2017 03:16 AM    Sodium 133 02/22/2017 03:16 AM    Potassium 3.7 02/22/2017 03:16 AM    Chloride 96 02/22/2017 03:16 AM    CO2 28 02/22/2017 03:16 AM    BUN 28 02/22/2017 03:16 AM    Creatinine 1.80 02/22/2017 03:16 AM    Calcium 8.9 02/22/2017 03:16 AM    Anion gap 9 02/22/2017 03:16 AM    BUN/Creatinine ratio 16 02/22/2017 03:16 AM    Bilirubin, total 0.5 02/22/2017 03:16 AM    ALT (SGPT) 21 02/22/2017 03:16 AM    AST (SGOT) 14 02/22/2017 03:16 AM    Alk. phosphatase 99 02/22/2017 03:16 AM    Protein, total 7.3 02/22/2017 03:16 AM     02/14/2017 03:03 AM    Albumin 2.9 02/22/2017 03:16 AM    Globulin 4.4 02/22/2017 03:16 AM    A-G Ratio 0.7 02/22/2017 03:16 AM   , PHOS:   Lab Results   Component Value Date/Time    Phosphorus 2.5 01/23/2017 12:09 PM   ,  Recent Labs      02/22/17   0316   NA  133*   K  3.7   CO2  28   BUN  28*   CREA  1.80*   GLU  155*   MG  2.2   WBC  5.0   HGB  9.0*   HCT  28.6*   PLT  282     Recent Labs      02/22/17   0316   INR  1.3*   PTP  12.9*     No lab exists for component: PBNP  No results for input(s): BNP in the last 72 hours. Medications reviewed:    Current Outpatient Prescriptions   Medication Sig    amiodarone (CORDARONE) 200 mg tablet Take 1 Tab by mouth every twelve (12) hours.  aspirin delayed-release 81 mg tablet Take 1 Tab by mouth daily.  atorvastatin (LIPITOR) 10 mg tablet Take 1 Tab by mouth daily.     carvedilol (COREG) 3.125 mg tablet Take 1 Tab by mouth two (2) times daily (with meals).  clopidogrel (PLAVIX) 75 mg tab Take 1 Tab by mouth daily.  bumetanide (BUMEX) 2 mg tablet Take 1 Tab by mouth two (2) times a day.  gabapentin (NEURONTIN) 100 mg capsule Take 1 Cap by mouth two (2) times a day.  magnesium oxide (MAG-OX) 400 mg tablet Take 1 Tab by mouth daily.  nicotine (NICODERM CQ) 14 mg/24 hr patch 1 Patch by TransDERmal route daily for 30 days.  spironolactone (ALDACTONE) 25 mg tablet Take 1 Tab by mouth daily.  milrinone (PRIMACOR) 20 mg/100 mL (200 mcg/mL) infusion 28.425 mcg/min by IntraVENous route continuous.  insulin NPH/insulin regular (NOVOLIN 70/30, HUMULIN 70/30) 100 unit/mL (70-30) injection 10 units w/ breakfast  8 units w/ dinner    Insulin Syringes, Disposable, 1 mL syrg Pt to take 10 units w/ breakfast and 8 units w/ dinner     No current facility-administered medications for this visit. Thank you for letting me see him with you,      Thank you for letting us see him with you,    Dorie Alba M.D.  [facility] [place] [address]  [address] [address]  Dept: [phone]  Dept Fax: [phone]: [phone] Fax: [phone]  24 hour VAD/HF Pager: [phone]

## 2017-02-24 NOTE — MR AVS SNAPSHOT
Visit Information Date & Time Provider Department Dept. Phone Encounter #  
 2/24/2017 11:00 AM Damaris Bonner MD [address] [id number] Follow-up Instructions Return in about 1 week (around 3/3/2017). Your Appointments 3/6/2017  1:40 PM  
HOSPITAL DISCHARGE with Maggy Pacheco MD  
[facility] ([facility] SCHEDULING) Appt Note: [phone] [facility]  
54 [facility] [place] [address] [address] Upcoming Health Maintenance Date Due  
 FOOT EXAM Q1 11/23/1962 MICROALBUMIN Q1 11/23/1962 EYE EXAM RETINAL OR DILATED Q1 11/23/1962 Pneumococcal 19-64 Highest Risk (1 of 3 - PCV13) 11/23/1971 DTaP/Tdap/Td series (1 - Tdap) 11/23/1973 ZOSTER VACCINE AGE 60> 11/23/2012 INFLUENZA AGE 9 TO ADULT 8/1/2016 HEMOGLOBIN A1C Q6M 7/19/2017 LIPID PANEL Q1 1/19/2018 FOBT Q 1 YEAR AGE 50-75 2/16/2018 Allergies as of 2/24/2017  Review Complete On: 2/24/2017 By: Damaris Bonner MD  
  
 Severity Noted Reaction Type Reactions Heparin (Porcine)  02/07/2017    Unknown (comments) Current Immunizations  Reviewed on 2/14/2017 No immunizations on file. Not reviewed this visit You Were Diagnosed With   
  
 Codes Comments Ischemic cardiomyopathy    -  Primary ICD-10-CM: I25.5 ICD-9-CM: 414.8 Acute combined systolic and diastolic ACC/AHA stage C congestive heart failure (HCC)     ICD-10-CM: I50.41 ICD-9-CM: 428.41, 428.0 Neuropathy due to secondary diabetes ([facility] [address]     ICD-10-CM: E13.40 ICD-9-CM: 249.60, 357.2 Controlled type 2 diabetes mellitus with diabetic neuropathy, without long-term current use of insulin (HCC)     ICD-10-CM: E11.40 ICD-9-CM: 250.60, 357.2 Lower GI bleeding     ICD-10-CM: K92.2 ICD-9-CM: 578.9 Vitals BP  
  
  
  
  
  
 110/76 BMI and BSA Data Body Mass Index Body Surface Area  
 24.11 kg/m 2 1.94 m 2 Preferred Pharmacy Pharmacy Name Phone [facility] [address] [facility] [phone] Your Updated Medication List  
  
   
This list is accurate as of: 2/24/17 12:44 PM.  Always use your most recent med list.  
  
  
  
  
 amiodarone 200 mg tablet Commonly known as:  CORDARONE Take 1 Tab by mouth every twelve (12) hours. aspirin delayed-release 81 mg tablet Take 1 Tab by mouth daily. atorvastatin 10 mg tablet Commonly known as:  LIPITOR Take 1 Tab by mouth daily. bumetanide 2 mg tablet Commonly known as:  Jett Grupo Take 1 Tab by mouth two (2) times a day. carvedilol 3.125 mg tablet Commonly known as:  Kelle Locket Take 1 Tab by mouth two (2) times daily (with meals). clopidogrel 75 mg Tab Commonly known as:  PLAVIX Take 1 Tab by mouth daily. gabapentin 300 mg capsule Commonly known as:  NEURONTIN Take 1 Cap by mouth three (3) times daily. insulin NPH/insulin regular 100 unit/mL (70-30) injection Commonly known as:  NOVOLIN 70/30, HUMULIN 70/30  
10 units w/ breakfast 8 units w/ dinner Insulin Syringes (Disposable) 1 mL Syrg Pt to take 10 units w/ breakfast and 8 units w/ dinner  
  
 magnesium oxide 400 mg tablet Commonly known as:  MAG-OX Take 1 Tab by mouth daily. milrinone 20 mg/100 mL (200 mcg/mL) infusion Commonly known as:  PRIMACOR  
28.425 mcg/min by IntraVENous route continuous. nicotine 14 mg/24 hr patch Commonly known as:  NICODERM CQ  
1 Patch by TransDERmal route daily for 30 days. sacubitril-valsartan 24-26 mg tablet Commonly known as:  ENTRESTO Take 1 Tab by mouth two (2) times a day. spironolactone 25 mg tablet Commonly known as:  ALDACTONE Take 1 Tab by mouth daily. Prescriptions Sent to Pharmacy Refills  
 sacubitril-valsartan (ENTRESTO) 24 mg/26 mg tablet 3 Sig: Take 1 Tab by mouth two (2) times a day. Class: Normal  
 Pharmacy: [id number] [facility] mail order [address], 600 [facility] [place] mail services Ph #: [phone] Route: Oral  
 gabapentin (NEURONTIN) 300 mg capsule 5 Sig: Take 1 Cap by mouth three (3) times daily. Class: Normal  
 Pharmacy: [id number] [facility] mail order [address], 600 [facility] [place] mail services Ph #: [phone] Route: Oral  
  
We Performed the Following CBC W/O DIFF [86202 CPT(R)] METABOLIC PANEL, BASIC [11841 CPT(R)] PRO-BNP [id number] CPT(R)] Follow-up Instructions Return in about 1 week (around 3/3/2017). To-Do List   
 02/26/2017 To Be Determined Appointment with Nathan Emanuel RN at [place] [address]  
  
 03/02/2017 To Be Determined Appointment with Nathan Emanuel RN at [place] [address]  
  
 03/06/2017 To Be Determined Appointment with Nathan Emanuel RN at [place] [address]  
  
 03/09/2017 To Be Determined Appointment with Nathan Emanuel RN at [place] [address]  
  
 03/16/2017 To Be Determined Appointment with Nathan Emanuel RN at [place] [address]  
  
 03/23/2017 To Be Determined Appointment with Nathan Emanuel RN at [place] [address]  
  
 03/30/2017 To Be Determined Appointment with Nathan Emanuel RN at [place] [address]  
  
 04/06/2017 To Be Determined Appointment with Nathan Emanuel RN at [place] [address]  
  
 04/13/2017 To Be Determined Appointment with Nathan Emanuel RN at [place] [address]  
  
 04/20/2017 To Be Determined Appointment with Magan Holman RN at [place] [address] Patient Instructions 1. Increase neurontin to 100 mg three times a day x 1 day then 200 mg three times a day x 2 days then 300 mg three times a day 2. Take tylenol 500 mg, 2 tablets every 6 hours as needed for pain 3. Start entresto 24/26 mg, 1 tablet twice daily 4. Blood work next week 5. Follow up in the [facility] clinic next week Introducing [facility]! [facility] introduces Shop pirate patient portal. Now you can access parts of your medical record, email your doctor's office, and request medication refills online. 1. In your internet browser, go to [email or web address] 2. Click on the First Time User? Click Here link in the Sign In box. You will see the New Member Sign Up page. 3. Enter your Shop pirate Access Code exactly as it appears below. You will not need to use this code after youve completed the sign-up process. If you do not sign up before the expiration date, you must request a new code. · Shop pirate Access Code: [email or web address] Expires: 4/18/2017 10:53 PM 
 
4. Enter the last four digits of your Social Security Number (xxxx) and Date of Birth (mm/dd/yyyy) as indicated and click Submit. You will be taken to the next sign-up page. 5. Create a Shop pirate ID. This will be your Shop pirate login ID and cannot be changed, so think of one that is secure and easy to remember. 6. Create a Shop pirate password. You can change your password at any time. 7. Enter your Password Reset Question and Answer. This can be used at a later time if you forget your password. 8. Enter your e-mail address. You will receive e-mail notification when new information is available in [address] 9. Click Sign Up. You can now view and download portions of your medical record. 10. Click the Download Summary menu link to download a portable copy of your medical information. If you have questions, please visit the Frequently Asked Questions section of the Jellycoastert website. Remember, [employer]arMed is NOT to be used for urgent needs. For medical emergencies, dial 911. Now available from your iPhone and Android! Please provide this summary of care documentation to your next provider. Your primary care clinician is listed as NONE. If you have any questions after today's visit, please call [phone].

## 2017-02-26 ENCOUNTER — HOME CARE VISIT (OUTPATIENT)
Dept: SCHEDULING | Facility: HOME HEALTH | Age: 67
End: 2017-02-26

## 2017-02-26 PROCEDURE — G0299 HHS/HOSPICE OF RN EA 15 MIN: HCPCS

## 2017-02-27 ENCOUNTER — HOME CARE VISIT (OUTPATIENT)
Dept: HOME HEALTH SERVICES | Facility: HOME HEALTH | Age: 67
End: 2017-02-27

## 2017-02-27 VITALS
TEMPERATURE: 98.3 F | RESPIRATION RATE: 18 BRPM | HEART RATE: 87 BPM | OXYGEN SATURATION: 99 % | SYSTOLIC BLOOD PRESSURE: 106 MMHG | DIASTOLIC BLOOD PRESSURE: 72 MMHG

## 2017-02-28 ENCOUNTER — TELEPHONE (OUTPATIENT)
Dept: CARDIOLOGY CLINIC | Age: 67
End: 2017-02-28

## 2017-02-28 LAB
BUN SERPL-MCNC: 31 MG/DL (ref 8–27)
BUN/CREAT SERPL: 19 (ref 10–22)
CALCIUM SERPL-MCNC: 9.2 MG/DL (ref 8.6–10.2)
CHLORIDE SERPL-SCNC: 93 MMOL/L (ref 96–106)
CO2 SERPL-SCNC: 22 MMOL/L (ref 18–29)
CREAT SERPL-MCNC: 1.64 MG/DL (ref 0.76–1.27)
ERYTHROCYTE [DISTWIDTH] IN BLOOD BY AUTOMATED COUNT: 19.2 % (ref 12.3–15.4)
GLUCOSE SERPL-MCNC: 204 MG/DL (ref 65–99)
HCT VFR BLD AUTO: 34.4 % (ref 37.5–51)
HGB BLD-MCNC: 10.9 G/DL (ref 12.6–17.7)
MCH RBC QN AUTO: 24.9 PG (ref 26.6–33)
MCHC RBC AUTO-ENTMCNC: 31.7 G/DL (ref 31.5–35.7)
MCV RBC AUTO: 79 FL (ref 79–97)
NT-PROBNP SERPL-MCNC: 2681 PG/ML (ref 0–210)
PLATELET # BLD AUTO: 291 X10E3/UL (ref 150–379)
POTASSIUM SERPL-SCNC: 4.6 MMOL/L (ref 3.5–5.2)
RBC # BLD AUTO: 4.37 X10E6/UL (ref 4.14–5.8)
SODIUM SERPL-SCNC: 134 MMOL/L (ref 134–144)
WBC # BLD AUTO: 7 X10E3/UL (ref 3.4–10.8)

## 2017-02-28 NOTE — TELEPHONE ENCOUNTER
Reviewed recent labwork with patient. Will determine next repeat at his next visit.     Lab Results   Component Value Date/Time    Sodium 134 02/27/2017 01:30 PM    Potassium 4.6 02/27/2017 01:30 PM    Chloride 93 02/27/2017 01:30 PM    CO2 22 02/27/2017 01:30 PM    Anion gap 9 02/22/2017 03:16 AM    Glucose 204 02/27/2017 01:30 PM    BUN 31 02/27/2017 01:30 PM    Creatinine 1.64 02/27/2017 01:30 PM    BUN/Creatinine ratio 19 02/27/2017 01:30 PM    GFR est AA 50 02/27/2017 01:30 PM    GFR est non-AA 44 02/27/2017 01:30 PM    Calcium 9.2 02/27/2017 01:30 PM

## 2017-03-01 ENCOUNTER — OFFICE VISIT (OUTPATIENT)
Dept: CARDIOLOGY CLINIC | Age: 67
End: 2017-03-01

## 2017-03-01 VITALS
DIASTOLIC BLOOD PRESSURE: 60 MMHG | HEART RATE: 80 BPM | HEIGHT: 70 IN | SYSTOLIC BLOOD PRESSURE: 100 MMHG | BODY MASS INDEX: 23.71 KG/M2 | RESPIRATION RATE: 16 BRPM | WEIGHT: 165.6 LBS | OXYGEN SATURATION: 96 %

## 2017-03-01 DIAGNOSIS — E13.40 NEUROPATHY DUE TO SECONDARY DIABETES (HCC): ICD-10-CM

## 2017-03-01 DIAGNOSIS — I50.41 ACUTE COMBINED SYSTOLIC AND DIASTOLIC ACC/AHA STAGE C CONGESTIVE HEART FAILURE (HCC): ICD-10-CM

## 2017-03-01 DIAGNOSIS — I25.5 ISCHEMIC CARDIOMYOPATHY: ICD-10-CM

## 2017-03-01 DIAGNOSIS — E11.40 CONTROLLED TYPE 2 DIABETES MELLITUS WITH DIABETIC NEUROPATHY, WITHOUT LONG-TERM CURRENT USE OF INSULIN (HCC): ICD-10-CM

## 2017-03-01 DIAGNOSIS — K92.2 LOWER GI BLEEDING: ICD-10-CM

## 2017-03-01 RX ORDER — BUMETANIDE 2 MG/1
2 TABLET ORAL DAILY
Qty: 60 TAB | Refills: 1 | Status: SHIPPED | OUTPATIENT
Start: 2017-03-01 | End: 2017-03-14 | Stop reason: SDUPTHER

## 2017-03-01 RX ORDER — DULOXETIN HYDROCHLORIDE 30 MG/1
30 CAPSULE, DELAYED RELEASE ORAL DAILY
Qty: 30 CAP | Refills: 5 | Status: SHIPPED | OUTPATIENT
Start: 2017-03-01 | End: 2018-03-06 | Stop reason: SDUPTHER

## 2017-03-01 NOTE — PATIENT INSTRUCTIONS
1. Reduce bumex to 2 mg, 1 tablet daily  2. Check BMP, pro BNP prior to next clinic visit  3. Start cymbalta 30 mg daily  4. Low sodium diet  5.  Follow up in the [facility] in 2 weeks

## 2017-03-01 NOTE — MR AVS SNAPSHOT
Visit Information Date & Time Provider Department Dept. Phone Encounter #  
 3/1/2017  3:30 PM Damaris Bonner MD [address] [id number] Follow-up Instructions Return in about 2 weeks (around 3/15/2017). Follow-up and Disposition History Your Appointments 3/6/2017  1:40 PM  
HOSPITAL DISCHARGE with Maggy Pacheco MD  
[facility] ([facility] SCHEDULING) Appt Note: [address] [facility]  
54 [facility] [place] [address] [address] Upcoming Health Maintenance Date Due  
 FOOT EXAM Q1 11/23/1962 MICROALBUMIN Q1 11/23/1962 EYE EXAM RETINAL OR DILATED Q1 11/23/1962 Pneumococcal 19-64 Highest Risk (1 of 3 - PCV13) 11/23/1971 DTaP/Tdap/Td series (1 - Tdap) 11/23/1973 ZOSTER VACCINE AGE 60> 11/23/2012 INFLUENZA AGE 9 TO ADULT 8/1/2016 HEMOGLOBIN A1C Q6M 7/19/2017 LIPID PANEL Q1 1/19/2018 FOBT Q 1 YEAR AGE 50-75 2/16/2018 Allergies as of 3/1/2017  Review Complete On: 3/1/2017 By: Damaris Bonner MD  
  
 Severity Noted Reaction Type Reactions Heparin (Porcine)  02/07/2017    Unknown (comments) Current Immunizations  Reviewed on 2/14/2017 No immunizations on file. Not reviewed this visit You Were Diagnosed With   
  
 Codes Comments Ischemic cardiomyopathy     ICD-10-CM: I25.5 ICD-9-CM: 414.8 Acute combined systolic and diastolic ACC/AHA stage C congestive heart failure (HCC)     ICD-10-CM: I50.41 ICD-9-CM: 428.41, 428.0 Neuropathy due to secondary diabetes ([facility] [address]     ICD-10-CM: E13.40 ICD-9-CM: 249.60, 357.2 Controlled type 2 diabetes mellitus with diabetic neuropathy, without long-term current use of insulin (HCC)     ICD-10-CM: E11.40 ICD-9-CM: 250.60, 357.2 Lower GI bleeding     ICD-10-CM: K92.2 ICD-9-CM: 578.9 Vitals  BP  
  
  
  
  
  
 100/60 (BP 1 Location: Left arm, BP Patient Position: Sitting) Vitals History BMI and BSA Data Body Mass Index Body Surface Area  
 23.76 kg/m 2 1.93 m 2 Preferred Pharmacy Pharmacy Name Phone [facility] -[phone] Your Updated Medication List  
  
   
This list is accurate as of: 3/1/17  4:25 PM.  Always use your most recent med list.  
  
  
  
  
 amiodarone 200 mg tablet Commonly known as:  CORDARONE Take 1 Tab by mouth every twelve (12) hours. aspirin delayed-release 81 mg tablet Take 1 Tab by mouth daily. atorvastatin 10 mg tablet Commonly known as:  LIPITOR Take 1 Tab by mouth daily. bumetanide 2 mg tablet Commonly known as:  Romana  Take 1 Tab by mouth daily. carvedilol 3.125 mg tablet Commonly known as:  Ari [place] Take 1 Tab by mouth two (2) times daily (with meals). clopidogrel 75 mg Tab Commonly known as:  PLAVIX Take 1 Tab by mouth daily. DULoxetine 30 mg capsule Commonly known as:  CYMBALTA Take 1 Cap by mouth daily. gabapentin 300 mg capsule Commonly known as:  NEURONTIN Take 1 Cap by mouth three (3) times daily. insulin NPH/insulin regular 100 unit/mL (70-30) injection Commonly known as:  NOVOLIN 70/30, HUMULIN 70/30  
10 units w/ breakfast 8 units w/ dinner Insulin Syringes (Disposable) 1 mL Syrg Pt to take 10 units w/ breakfast and 8 units w/ dinner  
  
 magnesium oxide 400 mg tablet Commonly known as:  MAG-OX Take 1 Tab by mouth daily. milrinone 20 mg/100 mL (200 mcg/mL) infusion Commonly known as:  PRIMACOR  
28.425 mcg/min by IntraVENous route continuous. nicotine 14 mg/24 hr patch Commonly known as:  NICODERM CQ  
1 Patch by TransDERmal route daily for 30 days. sacubitril-valsartan 24-26 mg tablet Commonly known as:  ENTRESTO Take 1 Tab by mouth two (2) times a day. spironolactone 25 mg tablet Commonly known as:  ALDACTONE Take 1 Tab by mouth daily. Prescriptions Sent to Pharmacy Refills  
 sacubitril-valsartan (ENTRESTO) 24 mg/26 mg tablet 3 Sig: Take 1 Tab by mouth two (2) times a day. Class: Normal  
 Pharmacy: [facility] [address] [facility] [address] Ph #: [phone] Route: Oral  
 gabapentin (NEURONTIN) 300 mg capsule 5 Sig: Take 1 Cap by mouth three (3) times daily. Class: Normal  
 Pharmacy: [facility] [address] [facility] [address], [address] Ph #: [phone] Route: Oral  
 DULoxetine (CYMBALTA) 30 mg capsule 5 Sig: Take 1 Cap by mouth daily. Class: Normal  
 Pharmacy: [facility] [address] [facility] [address], [address] Ph #: [phone] Route: Oral  
 bumetanide (BUMEX) 2 mg tablet 1 Sig: Take 1 Tab by mouth daily. Class: Normal  
 Pharmacy: [facility] [address] [facility] [address], [address] Ph #: [phone] Route: Oral  
  
Follow-up Instructions Return in about 2 weeks (around 3/15/2017). To-Do List   
 03/02/2017 To Be Determined Appointment with Lindy Alcantar RN at [place] [address]  
  
 03/06/2017 To Be Determined Appointment with Lindy Alcantar RN at [place] [address]  
  
 03/09/2017 To Be Determined Appointment with Lindy Alcantar RN at [place] [address]  
  
 03/16/2017 To Be Determined Appointment with Lindy Alcantar RN at [place] [address]  
  
 03/23/2017 To Be Determined Appointment with Lindy Alcantar RN at [place] [address]  
  
 03/30/2017 To Be Determined Appointment with Lindy Alcantar RN at [place] [address]  
  
 04/06/2017 To Be Determined   Appointment with Lindy Alcantar RN at [place] [address]  
  
 04/13/2017 To Be Determined Appointment with Bj Mcdermott RN at [place] [address]  
  
 04/20/2017 To Be Determined Appointment with Bj Mcdermott RN at [place] [address] Patient Instructions 1. Reduce bumex to 2 mg, 1 tablet daily 2. Check BMP, pro BNP prior to next clinic visit 3. Start cymbalta 30 mg daily 4. Low sodium diet 5. Follow up in the [facility] in 2 weeks Patient Instructions History Introducing [facility]! [employer]ohn introduces Easy Voyage patient portal. Now you can access parts of your medical record, email your doctor's office, and request medication refills online. 1. In your internet browser, go to [email or web address] 2. Click on the First Time User? Click Here link in the Sign In box. You will see the New Member Sign Up page. 3. Enter your Easy Voyage Access Code exactly as it appears below. You will not need to use this code after youve completed the sign-up process. If you do not sign up before the expiration date, you must request a new code. · Easy Voyage Access Code: [email or web address] Expires: 4/18/2017 10:53 PM 
 
4. Enter the last four digits of your Social Security Number (xxxx) and Date of Birth (mm/dd/yyyy) as indicated and click Submit. You will be taken to the next sign-up page. 5. Create a Easy Voyage ID. This will be your Easy Voyage login ID and cannot be changed, so think of one that is secure and easy to remember. 6. Create a Easy Voyage password. You can change your password at any time. 7. Enter your Password Reset Question and Answer. This can be used at a later time if you forget your password. 8. Enter your e-mail address. You will receive e-mail notification when new information is available in [address] 9. Click Sign Up. You can now view and download portions of your medical record.  
10. Click the Download Summary menu link to download a portable copy of your medical information. If you have questions, please visit the Frequently Asked Questions section of the Reqlut website. Remember, Reqlut is NOT to be used for urgent needs. For medical emergencies, dial 911. Now available from your iPhone and Android! Please provide this summary of care documentation to your next provider. Your primary care clinician is listed as NONE. If you have any questions after today's visit, please call [phone].

## 2017-03-01 NOTE — COMMUNICATION BODY
[facility] Clinic Note      NAME:  Fernanda De La Vega. :   1950   MRN:   [id number]   PCP:  None    Date:  2017     IMPRESSION/PLAN:  1. CAD s/p PCI ( of RCA, Left main): ASA, clopidogrel, BB, statin     2. ICM (LVEF 10%): on IV milrinone, carvedilol, spironolactone, bumex        Start entresto 24/26 mg bid        Reduce bumex to 2 mg, take 1 tablet daily in the morning    3. PAF: maintaining SR on amiodarone, no anticoagulation d/t gi bleeding    4. Lower GI Bleed: colonic polyp and AVM, no melena since hospital discharge    5. Nicotine Addiction: abstaining, continue nicotine patch 14 mg/hr    6. Diabetes: on insulin    7. Diabetic neuropathy: increase neurontin to 300 mg tid        Start cymbalta 30 mg daily    8. CKD3: stable    9. Hepatitis C: undetectable viral load        HPI  61year old male with a history of CAD s/p PCI to left main and  of RCA 0n 17,  ICM (LVEF 10%), PAF, nicotine addiction, Hep C (undetectable viral load), gi bleed with colonic polyp and AVM, who presents to clinic for follow up after a recent hospital admission for ADHF and treatment with Impella supported PCIs. He was discharged home on IV milrinone and continues to have orthopnea requiring him to sleep in a recliner. He denies any increase in his baseline dyspnea and worsening edema. He continues to have bilateral leg pain (6/10 intensity) and neck pain (6/10). He is compliant with wearing his LifeVest.          Subjective:   Cardiac ROS: Patient denies any current exertional chest pain, dyspnea at rest, palpitations, syncope. He admits to orthopnea, mild edema, and paroxysmal nocturnal dyspnea. He continues to have diabetic neuropathy pain, 6/10, in both legs and feet. He has not had any improvement with an increase in his neurontin dose. General Review of Systems: No nausea, indigestion, vomiting, pain, cough, sputum. No bleeding.  No new neurological symptoms. Taking po. Appetite normal. All other systems are negative except as noted in the HPI. Objective:     Visit Vitals    /60 (BP 1 Location: Left arm, BP Patient Position: Sitting)    Pulse 80    Resp 16    Ht 5' 10\" (1.778 m)    Wt 165 lb 9.6 oz (75.1 kg)    SpO2 96%    BMI 23.76 kg/m2          General:  alert, cooperative, no distress    HEENT: Normocephalic, EOMI, PERRLA, Hearing intact, trachea mid-line    Neck:  supple, no significant adenopathy, carotids upstroke normal bilaterally, no bruits    CVP:  8 cm  ( + ) HJR    Heart:  Diminished PMI    No normal inspection and palpation, prominent apical impulse    regular rate and rhythm, S1, S2 normal, S3 gallop    Murmur: Grade 2/6 sysolic murmur    Lungs: normal percussion bilaterally, diminished breath sounds bilaterally    Abdomen: soft, non-tender. Bowel sounds normal. Mild hepatomegaly    Extremity: No edema bilaterally    Neuro: Alert and oriented to person, place, and time; normal strength and tone. Normal symmetric reflexes. Normal coordination and gait             Past History:     Past Medical History:   Diagnosis Date    Cardiomyopathy ([facility] [address] 1/20/2017    A. Echo (1/19/17):  EF 5-10% with severe GHK,. Mildly dil LA. Mild TR. PASP 46.       Past Surgical History:   Procedure Laterality Date    COLONOSCOPY N/A 2/17/2017    COLONOSCOPY performed by Pepito Kumar. Charley Cam MD at [facility] ENDOSCOPY       Social History   Substance Use Topics    Smoking status: Former Smoker     Packs/day: 2.00     Years: 45.00     Quit date: 1/20/2017    Smokeless tobacco: Not on file    Alcohol use No        History reviewed. No pertinent family history. Allergies: Allergies   Allergen Reactions    Heparin (Porcine) Unknown (comments)        Data Review:       Echocardiogram (2/14/17): Left ventricle: The ventricle was moderately to severely dilated. Systolic  function was severely reduced.  Ejection fraction was estimated to be 10 %.  There was severe diffuse hypokinesis. There was moderate spontaneous echo  contrast, indicative of stasis. Ventricular septum: There was moderate paradoxical motion. Left atrium: The atrium was dilated. Mitral valve: There was moderate regurgitation. Tricuspid valve: There was mild to moderate regurgitation. Pericardium: There was a moderate-sized left pleural effusion      ECG:   Sinus rhythm with fusion complexes   Possible Left atrial enlargement   Nonspecific intraventricular block   Cannot rule out Anterior infarct , age undetermined   When compared with ECG of 23-JAN-2017 10:13,   fusion complexes are now present   QRS duration has increased   Nonspecific T wave abnormality has replaced inverted T waves in Inferior   leads     CMP:   Lab Results   Component Value Date/Time    Glucose 204 02/27/2017 01:30 PM    Sodium 134 02/27/2017 01:30 PM    Potassium 4.6 02/27/2017 01:30 PM    Chloride 93 02/27/2017 01:30 PM    CO2 22 02/27/2017 01:30 PM    BUN 31 02/27/2017 01:30 PM    Creatinine 1.64 02/27/2017 01:30 PM    Calcium 9.2 02/27/2017 01:30 PM    Anion gap 9 02/22/2017 03:16 AM    BUN/Creatinine ratio 19 02/27/2017 01:30 PM    Bilirubin, total 0.5 02/22/2017 03:16 AM    ALT (SGPT) 21 02/22/2017 03:16 AM    AST (SGOT) 14 02/22/2017 03:16 AM    Alk. phosphatase 99 02/22/2017 03:16 AM    Protein, total 7.3 02/22/2017 03:16 AM     02/14/2017 03:03 AM    Albumin 2.9 02/22/2017 03:16 AM    Globulin 4.4 02/22/2017 03:16 AM    A-G Ratio 0.7 02/22/2017 03:16 AM   , PHOS:   Lab Results   Component Value Date/Time    Phosphorus 2.5 01/23/2017 12:09 PM   ,  Recent Labs      02/27/17   1330   NA  134   K  4.6   CO2  22   BUN  31*   CREA  1.64*   GLU  204*   WBC  7.0   HGB  10.9*   HCT  34.4*   PLT  291     No results for input(s): INR, PTP, APTT in the last 72 hours.     No lab exists for component: INREXT, INREXT  No lab exists for component: PBNP  No results for input(s): BNP in the last 72 hours.      Medications reviewed:    Current Outpatient Prescriptions   Medication Sig    sacubitril-valsartan (ENTRESTO) 24 mg/26 mg tablet Take 1 Tab by mouth two (2) times a day.  gabapentin (NEURONTIN) 300 mg capsule Take 1 Cap by mouth three (3) times daily.  amiodarone (CORDARONE) 200 mg tablet Take 1 Tab by mouth every twelve (12) hours.  aspirin delayed-release 81 mg tablet Take 1 Tab by mouth daily.  atorvastatin (LIPITOR) 10 mg tablet Take 1 Tab by mouth daily.  carvedilol (COREG) 3.125 mg tablet Take 1 Tab by mouth two (2) times daily (with meals).  clopidogrel (PLAVIX) 75 mg tab Take 1 Tab by mouth daily.  bumetanide (BUMEX) 2 mg tablet Take 1 Tab by mouth two (2) times a day.  magnesium oxide (MAG-OX) 400 mg tablet Take 1 Tab by mouth daily.  nicotine (NICODERM CQ) 14 mg/24 hr patch 1 Patch by TransDERmal route daily for 30 days.  spironolactone (ALDACTONE) 25 mg tablet Take 1 Tab by mouth daily.  milrinone (PRIMACOR) 20 mg/100 mL (200 mcg/mL) infusion 28.425 mcg/min by IntraVENous route continuous.  insulin NPH/insulin regular (NOVOLIN 70/30, HUMULIN 70/30) 100 unit/mL (70-30) injection 10 units w/ breakfast  8 units w/ dinner    Insulin Syringes, Disposable, 1 mL syrg Pt to take 10 units w/ breakfast and 8 units w/ dinner     No current facility-administered medications for this visit. Thank you for letting us see him with you,    Dorie [facility] Mera M.D.  [facility] [address] [address]  Dept: [phone]  Dept Fax: [phone]: [phone] Fax: [phone]  24 hour VAD/HF Pager: [phone]

## 2017-03-01 NOTE — PROGRESS NOTES
[facility] Clinic Note      NAME:  Fernanda Freeman. :   1950   MRN:   [id number]   PCP:  None    Date:  2017     IMPRESSION/PLAN:  1. CAD s/p PCI ( of RCA, Left main): ASA, clopidogrel, BB, statin     2. ICM (LVEF 10%): on IV milrinone, carvedilol, spironolactone, bumex        Start entresto 24/26 mg bid        Reduce bumex to 2 mg, take 1 tablet daily in the morning    3. PAF: maintaining SR on amiodarone, no anticoagulation d/t gi bleeding    4. Lower GI Bleed: colonic polyp and AVM, no melena since hospital discharge    5. Nicotine Addiction: abstaining, continue nicotine patch 14 mg/hr    6. Diabetes: on insulin    7. Diabetic neuropathy: increase neurontin to 300 mg tid        Start cymbalta 30 mg daily    8. CKD3: stable    9. Hepatitis C: undetectable viral load        HPI  61year old male with a history of CAD s/p PCI to left main and  of RCA 0n 17,  ICM (LVEF 10%), PAF, nicotine addiction, Hep C (undetectable viral load), gi bleed with colonic polyp and AVM, who presents to clinic for follow up after a recent hospital admission for ADHF and treatment with Impella supported PCIs. He was discharged home on IV milrinone and continues to have orthopnea requiring him to sleep in a recliner. He denies any increase in his baseline dyspnea and worsening edema. He continues to have bilateral leg pain (6/10 intensity) and neck pain (6/10). He is compliant with wearing his LifeVest.          Subjective:   Cardiac ROS: Patient denies any current exertional chest pain, dyspnea at rest, palpitations, syncope. He admits to orthopnea, mild edema, and paroxysmal nocturnal dyspnea. He continues to have diabetic neuropathy pain, 6/10, in both legs and feet. He has not had any improvement with an increase in his neurontin dose. General Review of Systems: No nausea, indigestion, vomiting, pain, cough, sputum. No bleeding.  No new neurological symptoms. Taking po. Appetite normal. All other systems are negative except as noted in the HPI. Objective:     Visit Vitals    /60 (BP 1 Location: Left arm, BP Patient Position: Sitting)    Pulse 80    Resp 16    Ht 5' 10\" (1.778 m)    Wt 165 lb 9.6 oz (75.1 kg)    SpO2 96%    BMI 23.76 kg/m2          General:  alert, cooperative, no distress    HEENT: Normocephalic, EOMI, PERRLA, Hearing intact, trachea mid-line    Neck:  supple, no significant adenopathy, carotids upstroke normal bilaterally, no bruits    CVP:  8 cm  ( + ) HJR    Heart:  Diminished PMI    No normal inspection and palpation, prominent apical impulse    regular rate and rhythm, S1, S2 normal, S3 gallop    Murmur: Grade 2/6 sysolic murmur    Lungs: normal percussion bilaterally, diminished breath sounds bilaterally    Abdomen: soft, non-tender. Bowel sounds normal. Mild hepatomegaly    Extremity: No edema bilaterally    Neuro: Alert and oriented to person, place, and time; normal strength and tone. Normal symmetric reflexes. Normal coordination and gait             Past History:     Past Medical History:   Diagnosis Date    Cardiomyopathy ([facility] [address] 1/20/2017    A. Echo (1/19/17):  EF 5-10% with severe GHK,. Mildly dil LA. Mild TR. PASP 46.       Past Surgical History:   Procedure Laterality Date    COLONOSCOPY N/A 2/17/2017    COLONOSCOPY performed by Sameer Ya. Yang Harris MD at [facility] ENDOSCOPY       Social History   Substance Use Topics    Smoking status: Former Smoker     Packs/day: 2.00     Years: 45.00     Quit date: 1/20/2017    Smokeless tobacco: Not on file    Alcohol use No        History reviewed. No pertinent family history. Allergies: Allergies   Allergen Reactions    Heparin (Porcine) Unknown (comments)        Data Review:       Echocardiogram (2/14/17): Left ventricle: The ventricle was moderately to severely dilated. Systolic  function was severely reduced.  Ejection fraction was estimated to be 10 %.  There was severe diffuse hypokinesis. There was moderate spontaneous echo  contrast, indicative of stasis. Ventricular septum: There was moderate paradoxical motion. Left atrium: The atrium was dilated. Mitral valve: There was moderate regurgitation. Tricuspid valve: There was mild to moderate regurgitation. Pericardium: There was a moderate-sized left pleural effusion      ECG:   Sinus rhythm with fusion complexes   Possible Left atrial enlargement   Nonspecific intraventricular block   Cannot rule out Anterior infarct , age undetermined   When compared with ECG of 23-JAN-2017 10:13,   fusion complexes are now present   QRS duration has increased   Nonspecific T wave abnormality has replaced inverted T waves in Inferior   leads     CMP:   Lab Results   Component Value Date/Time    Glucose 204 02/27/2017 01:30 PM    Sodium 134 02/27/2017 01:30 PM    Potassium 4.6 02/27/2017 01:30 PM    Chloride 93 02/27/2017 01:30 PM    CO2 22 02/27/2017 01:30 PM    BUN 31 02/27/2017 01:30 PM    Creatinine 1.64 02/27/2017 01:30 PM    Calcium 9.2 02/27/2017 01:30 PM    Anion gap 9 02/22/2017 03:16 AM    BUN/Creatinine ratio 19 02/27/2017 01:30 PM    Bilirubin, total 0.5 02/22/2017 03:16 AM    ALT (SGPT) 21 02/22/2017 03:16 AM    AST (SGOT) 14 02/22/2017 03:16 AM    Alk. phosphatase 99 02/22/2017 03:16 AM    Protein, total 7.3 02/22/2017 03:16 AM     02/14/2017 03:03 AM    Albumin 2.9 02/22/2017 03:16 AM    Globulin 4.4 02/22/2017 03:16 AM    A-G Ratio 0.7 02/22/2017 03:16 AM   , PHOS:   Lab Results   Component Value Date/Time    Phosphorus 2.5 01/23/2017 12:09 PM   ,  Recent Labs      02/27/17   1330   NA  134   K  4.6   CO2  22   BUN  31*   CREA  1.64*   GLU  204*   WBC  7.0   HGB  10.9*   HCT  34.4*   PLT  291     No results for input(s): INR, PTP, APTT in the last 72 hours.     No lab exists for component: INREXT, INREXT  No lab exists for component: PBNP  No results for input(s): BNP in the last 72 hours.      Medications reviewed:    Current Outpatient Prescriptions   Medication Sig    sacubitril-valsartan (ENTRESTO) 24 mg/26 mg tablet Take 1 Tab by mouth two (2) times a day.  gabapentin (NEURONTIN) 300 mg capsule Take 1 Cap by mouth three (3) times daily.  amiodarone (CORDARONE) 200 mg tablet Take 1 Tab by mouth every twelve (12) hours.  aspirin delayed-release 81 mg tablet Take 1 Tab by mouth daily.  atorvastatin (LIPITOR) 10 mg tablet Take 1 Tab by mouth daily.  carvedilol (COREG) 3.125 mg tablet Take 1 Tab by mouth two (2) times daily (with meals).  clopidogrel (PLAVIX) 75 mg tab Take 1 Tab by mouth daily.  bumetanide (BUMEX) 2 mg tablet Take 1 Tab by mouth two (2) times a day.  magnesium oxide (MAG-OX) 400 mg tablet Take 1 Tab by mouth daily.  nicotine (NICODERM CQ) 14 mg/24 hr patch 1 Patch by TransDERmal route daily for 30 days.  spironolactone (ALDACTONE) 25 mg tablet Take 1 Tab by mouth daily.  milrinone (PRIMACOR) 20 mg/100 mL (200 mcg/mL) infusion 28.425 mcg/min by IntraVENous route continuous.  insulin NPH/insulin regular (NOVOLIN 70/30, HUMULIN 70/30) 100 unit/mL (70-30) injection 10 units w/ breakfast  8 units w/ dinner    Insulin Syringes, Disposable, 1 mL syrg Pt to take 10 units w/ breakfast and 8 units w/ dinner     No current facility-administered medications for this visit. Thank you for letting us see him with you,    Dorie Cisneros M.D.  [address] [address]  [address] [address]  Dept: [phone]  Dept Fax: [phone]: [phone] Fax: [phone]  24 hour VAD/HF Pager: [phone]

## 2017-03-02 ENCOUNTER — HOME CARE VISIT (OUTPATIENT)
Dept: SCHEDULING | Facility: HOME HEALTH | Age: 67
End: 2017-03-02

## 2017-03-02 PROCEDURE — G0299 HHS/HOSPICE OF RN EA 15 MIN: HCPCS

## 2017-03-03 ENCOUNTER — HOME CARE VISIT (OUTPATIENT)
Dept: HOME HEALTH SERVICES | Facility: HOME HEALTH | Age: 67
End: 2017-03-03

## 2017-03-03 VITALS
WEIGHT: 160.4 LBS | BODY MASS INDEX: 23.02 KG/M2 | DIASTOLIC BLOOD PRESSURE: 56 MMHG | SYSTOLIC BLOOD PRESSURE: 94 MMHG | TEMPERATURE: 98 F | OXYGEN SATURATION: 95 % | RESPIRATION RATE: 18 BRPM | HEART RATE: 78 BPM

## 2017-03-03 PROCEDURE — G0155 HHCP-SVS OF CSW,EA 15 MIN: HCPCS

## 2017-03-04 NOTE — DISCHARGE SUMMARY
[facility] Discharge Summary     Patient ID:  Estrella Reynoso  [id number]  61 y.o.  1950    Admit date: 1/20/2017    Discharge date: 3/3/2017     Admitting Physician: Kristopher Villarreal MD     Referring Cardiologist:  Idalia Mcmullen. sAa Cannon    PCP:  none    Admitting Diagnoses: CAD, chronic heart failure, ICM EF 10%    Discharge Diagnoses:     Hospital Problems  Date Reviewed: 2/17/2017          Codes Class Noted POA    Systolic heart failure ([facility] [address] ICD-10-CM: I50.20  ICD-9-CM: 428.20  1/20/2017 Unknown              Discharged Condition:  improved    Procedures for this admission:    2/9/17 SVO2 swan placed via RFV, Impella placed via LFA, PCI performed via RFA   Successful MIKE pRCA: 2.5x38 Xience + 2.75x12 Xience overlapping. Successful MIKE ost left main: 4.0x12 Xience post-dil with 4.5x12 NC. By Dr. Asa Cannon    2/14/17 Removal percutaneous ventricular   assist device (Impella pump) at separate and distinct session from   Insertion by Dr. Man Keyes     2/17/17 EGD by Dr. Aditi Angela    2/17/17 Colonoscopy by Dr. Aditi Angela    2/21/17 Capsule study by Dr. Aditi Angela    Discharge Medications:   Discharge Medication List as of 2/22/2017  4:39 PM      START taking these medications    Details   amiodarone (CORDARONE) 200 mg tablet Take 1 Tab by mouth every twelve (12) hours. , Print, Disp-60 Tab, R-1      aspirin delayed-release 81 mg tablet Take 1 Tab by mouth daily. , Print, Disp-30 Tab, R-1      atorvastatin (LIPITOR) 10 mg tablet Take 1 Tab by mouth daily. , Print, Disp-30 Tab, R-1      carvedilol (COREG) 3.125 mg tablet Take 1 Tab by mouth two (2) times daily (with meals). , Print, Disp-60 Tab, R-1      clopidogrel (PLAVIX) 75 mg tab Take 1 Tab by mouth daily. , Print, Disp-30 Tab, R-1      magnesium oxide (MAG-OX) 400 mg tablet Take 1 Tab by mouth daily. , Print, Disp-30 Tab, R-1      nicotine (NICODERM CQ) 14 mg/24 hr patch 1 Patch by TransDERmal route daily for 30 days. , Print, Disp-30 Patch, R-1      spironolactone (ALDACTONE) 25 mg tablet Take 1 Tab by mouth daily. , Print, Disp-30 Tab, R-1      milrinone (PRIMACOR) 20 mg/100 mL (200 mcg/mL) infusion 28.425 mcg/min by IntraVENous route continuous. , Print, Disp-100 mL, R-1      insulin NPH/insulin regular (NOVOLIN 70/30, HUMULIN 70/30) 100 unit/mL (70-30) injection 10 units w/ breakfast  8 units w/ dinner, Print, Disp-10 mL, R-2      Insulin Syringes, Disposable, 1 mL syrg Pt to take 10 units w/ breakfast and 8 units w/ dinner, Print, Disp-500 Syringe, R-1      bumetanide (BUMEX) 2 mg tablet Take 1 Tab by mouth two (2) times a day., Print, Disp-60 Tab, R-1      gabapentin (NEURONTIN) 100 mg capsule Take 1 Cap by mouth two (2) times a day., Print, Disp-60 Cap, R-1           HPI:  He was initially seen at [facility] 3 years ago and told that he had heart failure with LVEF 30%. He was lost to follow up due to lack of insurance and has not been seen by a physician in the interim. He complains of progressive fatigue, NAVARRO, PND, and edema over the past year, prompting presentation to [facility]. He also complains of lower extremity bullae, weeping, and pain. He denies palpitations, presyncope, syncope, or chest pain.     On admission to [facility], he was found to have diabetes with Hga1c 13.5 and severe native CAD on heart cath by Dr. Norma Roe. He was transferred to [facility] for consideration of high risk surgical revascularization.     30+ minutes spent reviewing imaging and labs prior to the patient interview and examination.     General Review of Systems: No nausea, indigestion, vomiting, pain, cough, sputum. No bleeding. No new neurological symptoms. Taking po. Appetite normal. All other systems are negative except as noted in the HPI. Hospital Course:   Mr. Tati Moran was trasnferred from [facility] for evaluation for CABG d/t significant CAD. He was started on milrinone and had an MRI performed on 1/31/17. Please see results listed below.   Due to his EF of 10% and other issues it was determined he was too high surgical risk for open heart surgical revascularization. He remained in the hospital awaiting timing of his PCI. His diuretics were adjusted to volume overload. He was treated for some cellulitis by ID in his b/l LE extremities. During this period, he developed some abdominal tenderness and elevated LFTs. The pt had  an abdominal ultraosound. It was thought he could have acalculous cholecystitis. A surgical consult was obtained, and he was not a surgical candidate. His abx were adjusted by ID and his abdominal tenderness eventually resolved after treatment with Zosyn by ID. His LFTs decreased. 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 This was managed by using Angiomax instead of Heparin. On 2/9/17, he was taken to the cath lab where he underwent placement of an Impella device and two high risk PCI by Dr. Mulugeta Taylor. Please see full procedure note for details. He was transferred to CVICU in stable condition. Due to his significantly reduced ejection fraction, he remained on Impella support and inotropes. He was taken to the operating room on 2/14/17 where he underwent removal of his Impella device by Dr. Corea. Please see full operative note for details. He remained in the CVICU on milrinone in stable condition. Due to the severity of his heart failure, it was determined that the patient was inotrope dependent so his milrinone was continued. Once transferred to the CVSU, he continued to experience a low Hg count and an eventual GI consult was obtained w/ an EGD and colonoscopy being performed. The colonoscopy discovered an AVM and colonic polyp that was not removed since pt. Was on Plavix. It was determined this could be dealt with as an outpatient. He continued to have dark stools so a capsule study was performed which was negative for active bleeding.    Repeat echos, determined the pts EF to remain significantly reduced and he was fitted with a Life Vest. He also received a PICC line. His medications were adjusted based on his labwork and BP. He was discaharged to home with a Life Vest and home milrinone. The patient will be followed closely in the [facility] clinic. Discharge Vital Signs:   Visit Vitals    BP 96/60 (BP 1 Location: Right arm, BP Patient Position: At rest)    Pulse 81    Temp 98.2 °F (36.8 °C)    Resp 18    Ht 5' 9\" (1.753 m)    Wt 168 lb 10.4 oz (76.5 kg)    SpO2 96%    BMI 24.91 kg/m2     Diagnostics:   MRI 1/31/17 -    1. Markedly dilated left ventricle with 3-D end-diastolic volume index to body  surface area of 153 mL/sq m. Mild eccentric left ventricular hypertrophy with  3-D mass index to body surface area of 85 g/sq m. Severe left ventricular  systolic dysfunction. Severe global hypokinesis with regional variation. 3-D  LVEF 10%. 2. Moderately dilated right ventricle by 3-D volumetric assessment. RV end  diastolic volume indexed to body surface area of 138 mL/sq m. Moderate to severe  right ventricular systolic dysfunction. Global hypokinesis. 3-D RVEF 25%. 3. Apical a tethered mitral valve leaflets. Mild mitral regurgitation. 4. Moderately severe 3+ tricuspid regurgitation. 5. On LGE study, the anterior wall, anteroseptal wall, anteroapical wall, and  anterior lateral wall are largely viable without significant myocardial  infarction. The entire inferior wall and inferoseptal wall are completely viable  without any infarct. The base to mid inferolateral wall demonstrate a near  transmural greater than 75% thickness infarct with minimal or no viable  myocardium in this territory. The distal inferolateral wall, apical lateral wall  does not demonstrate any infarct and are largely viable. Based on the viability  imaging, the entire LAD territory is largely viable and should recover upon  revascularization. The entire RCA territory is largely viable and should recover  upon revascularization.  The LCx territory demonstrate medium-size infarct with  limited viability. 6. Large left-sided pleural effusion with passive atelectasis of the left lung. 7. Dilated branch pulmonary arteries suggest pulmonary hypertension. 8. Markedly dilated left atrium measuring 59 x 55 mm. Moderately dilated right  atrium measuring 46 x 56 mm.     CARDIAC CHAMBERS:     Left Atrium: 59 x 55 mm (<40mm)  Left Ventricular Size: LVEDD: 62 mm (Normal 37-55mm)  Left Ventricular Size: LVESD: 55 mm (Normal <40mm)  LVEF: 10 % (Normal 55-75%)  LV Hypertrophy: Mild eccentric left ventricular hypertrophy. LV septal wall = 8  mm, LV posterior wall = 7 mm (Normal <11mm)      Right Atrium: 46 mm (<40mm)  Right Ventricular Size: RVEDD: 50 mm (Normal 26-45mm)  Right Ventricular Size: RVESD: 44 mm (Normal <25mm)  RVEF: 25 % (Normal 50-60%)  RV Hypertrophy: None     GREAT VESSELS       Ascending Aorta: 36 mm (normal 22-37mm)   Aortic Arch: 28 mm (normal 18-28mm)   Descending Aorta: 27 mm (normal 14-26mm)   MPA: 31 mm (normal 16-33mm)   LPA: 26 mm (normal 13-22mm)   RPA: 25 mm (normal 12-23mm)       Mass/Tumor/Thrombus: None. Dissection: None. Atherosclerosis: None. Inferior Vena Cava: Normal.  Inter Atrial Septum: Normal.     VALVULAR:     Mitral Valve: Normal mitral valve leaflets. Mild mitral regurgitation. Aortic Valve: Trileaflet aortic valve. Normal leaflet mobility. Tricuspid Valve: Normal tricuspid valve. Moderate to severe 3 + tricuspid  regurgitation. Pulmonary Valve: Normal pulmonic valve.     Pericardium: Normal. (<3.5mm)   Pericardial Effusion: None.   Pleural Effusion: Large left-sided pleural effusion with atelectasis of the  left lung.     VOLUMETRIC DATA:     LVEDVI: 153 ml/m2   LVESVI: 138 ml/m2   LVSVI: 15 ml/m2   LVMI: 85 g/m2        RVEDVI: 138 ml/m2   RVESVI: 103 ml/m2   RVSVI: 35 ml/m2     2/17/17 Interval improvement of left basilar patchy airspace disease and  small left pleural effusion.            Patient Instructions/Follow up care:  Discharge instructions were reviewed with the patient and family present. Questions were also answered at this time. Prescriptions and medications were reviewed. The patient has a follow up appointment with [address] [place] [address] on February 24, 2017 at 11:00am.  The patient was also instructed to follow up with his primary care physician as needed. The patient and family were encouraged to call with any questions or concerns.         Signed:  Hasmukh Dietrich PA-C    Saw patient, agree with above  Risk of morbidity and mortality - high  Medical decision making - high complexity

## 2017-03-06 ENCOUNTER — OFFICE VISIT (OUTPATIENT)
Dept: CARDIOLOGY CLINIC | Age: 67
End: 2017-03-06

## 2017-03-06 ENCOUNTER — HOME CARE VISIT (OUTPATIENT)
Dept: SCHEDULING | Facility: HOME HEALTH | Age: 67
End: 2017-03-06

## 2017-03-06 VITALS
DIASTOLIC BLOOD PRESSURE: 62 MMHG | HEIGHT: 70 IN | SYSTOLIC BLOOD PRESSURE: 106 MMHG | WEIGHT: 167 LBS | HEART RATE: 75 BPM | BODY MASS INDEX: 23.91 KG/M2 | OXYGEN SATURATION: 99 % | RESPIRATION RATE: 18 BRPM

## 2017-03-06 DIAGNOSIS — I25.5 ISCHEMIC CARDIOMYOPATHY: ICD-10-CM

## 2017-03-06 DIAGNOSIS — E78.5 DYSLIPIDEMIA: ICD-10-CM

## 2017-03-06 DIAGNOSIS — N18.9 CHRONIC RENAL INSUFFICIENCY, UNSPECIFIED STAGE: ICD-10-CM

## 2017-03-06 DIAGNOSIS — I48.0 PAROXYSMAL ATRIAL FIBRILLATION (HCC): Primary | ICD-10-CM

## 2017-03-06 DIAGNOSIS — E11.8 TYPE 2 DIABETES MELLITUS WITH COMPLICATION, UNSPECIFIED LONG TERM INSULIN USE STATUS: ICD-10-CM

## 2017-03-06 PROBLEM — B19.20 HEPATITIS C: Status: ACTIVE | Noted: 2017-03-06

## 2017-03-06 PROBLEM — N17.9 ACUTE RENAL FAILURE (ARF) (HCC): Status: RESOLVED | Noted: 2017-01-19 | Resolved: 2017-03-06

## 2017-03-06 PROBLEM — E11.9 DIABETES MELLITUS (HCC): Status: ACTIVE | Noted: 2017-03-06

## 2017-03-06 PROBLEM — R09.02 HYPOXIA: Status: RESOLVED | Noted: 2017-01-19 | Resolved: 2017-03-06

## 2017-03-06 PROBLEM — I87.2 VENOUS STASIS DERMATITIS OF BOTH LOWER EXTREMITIES: Status: RESOLVED | Noted: 2017-01-19 | Resolved: 2017-03-06

## 2017-03-06 PROBLEM — R60.0 BILATERAL LOWER EXTREMITY EDEMA: Status: RESOLVED | Noted: 2017-01-19 | Resolved: 2017-03-06

## 2017-03-06 PROBLEM — I50.21 ACUTE SYSTOLIC (CONGESTIVE) HEART FAILURE (HCC): Status: RESOLVED | Noted: 2017-01-19 | Resolved: 2017-03-06

## 2017-03-06 PROBLEM — J81.1 PULMONARY EDEMA: Status: RESOLVED | Noted: 2017-01-19 | Resolved: 2017-03-06

## 2017-03-06 PROBLEM — Z87.19 H/O: GI BLEED: Status: ACTIVE | Noted: 2017-03-06

## 2017-03-06 PROBLEM — R00.0 SINUS TACHYCARDIA: Status: RESOLVED | Noted: 2017-01-19 | Resolved: 2017-03-06

## 2017-03-06 PROBLEM — I50.20 SYSTOLIC HEART FAILURE (HCC): Status: RESOLVED | Noted: 2017-01-20 | Resolved: 2017-03-06

## 2017-03-06 PROBLEM — R06.02 SHORTNESS OF BREATH: Status: RESOLVED | Noted: 2017-01-19 | Resolved: 2017-03-06

## 2017-03-06 PROBLEM — E11.65 HYPERGLYCEMIA DUE TO TYPE 2 DIABETES MELLITUS (HCC): Status: RESOLVED | Noted: 2017-01-19 | Resolved: 2017-03-06

## 2017-03-06 RX ORDER — GABAPENTIN 300 MG/1
900 CAPSULE ORAL 3 TIMES DAILY
COMMUNITY
End: 2017-05-24

## 2017-03-06 NOTE — MR AVS SNAPSHOT
Visit Information Date & Time Provider Department Dept. Phone Encounter #  
 3/6/2017  1:40 PM Herman Stiles MD [facility] Abner Valderrama [phone] [id number] Upcoming Health Maintenance Date Due  
 FOOT EXAM Q1 11/23/1962 MICROALBUMIN Q1 11/23/1962 EYE EXAM RETINAL OR DILATED Q1 11/23/1962 Pneumococcal 19-64 Medium Risk (1 of 1 - PPSV23) 11/23/1971 DTaP/Tdap/Td series (1 - Tdap) 11/23/1973 ZOSTER VACCINE AGE 60> 11/23/2012 INFLUENZA AGE 9 TO ADULT 8/1/2016 HEMOGLOBIN A1C Q6M 7/19/2017 LIPID PANEL Q1 1/19/2018 FOBT Q 1 YEAR AGE 50-75 2/16/2018 Allergies as of 3/6/2017  Review Complete On: 3/6/2017 By: Herman Stiles MD  
  
 Severity Noted Reaction Type Reactions Heparin (Porcine)  02/07/2017    Unknown (comments) Current Immunizations  Reviewed on 2/14/2017 No immunizations on file. Not reviewed this visit You Were Diagnosed With   
  
 Codes Comments Paroxysmal atrial fibrillation (HCC)    -  Primary ICD-10-CM: I48.0 ICD-9-CM: 427.31 Vitals BP Pulse Resp Height(growth percentile) Weight(growth percentile) SpO2  
 106/62 (BP 1 Location: Left arm) 75 18 5' 10\" (1.778 m) 167 lb (75.8 kg) 99% BMI Smoking Status 23.96 kg/m2 Former Smoker Vitals History BMI and BSA Data Body Mass Index Body Surface Area  
 23.96 kg/m 2 1.93 m 2 Preferred Pharmacy Pharmacy Name Phone [facility] [place], [address] [phone] Your Updated Medication List  
  
   
This list is accurate as of: 3/6/17  2:06 PM.  Always use your most recent med list.  
  
  
  
  
 amiodarone 200 mg tablet Commonly known as:  CORDARONE Take 1 Tab by mouth every twelve (12) hours. aspirin delayed-release 81 mg tablet Take 1 Tab by mouth daily. atorvastatin 10 mg tablet Commonly known as:  LIPITOR Take 1 Tab by mouth daily. bumetanide 2 mg tablet Commonly known as:  Severiano Bue Take 1 Tab by mouth daily. carvedilol 3.125 mg tablet Commonly known as:  Haskel Scarce Take 1 Tab by mouth two (2) times daily (with meals). clopidogrel 75 mg Tab Commonly known as:  PLAVIX Take 1 Tab by mouth daily. DULoxetine 30 mg capsule Commonly known as:  CYMBALTA Take 1 Cap by mouth daily. gabapentin 300 mg capsule Commonly known as:  NEURONTIN Take 900 mg by mouth three (3) times daily. insulin NPH/insulin regular 100 unit/mL (70-30) injection Commonly known as:  NOVOLIN 70/30, HUMULIN 70/30  
10 units w/ breakfast 8 units w/ dinner Insulin Syringes (Disposable) 1 mL Syrg Pt to take 10 units w/ breakfast and 8 units w/ dinner  
  
 magnesium oxide 400 mg tablet Commonly known as:  MAG-OX Take 1 Tab by mouth daily. milrinone 20 mg/100 mL (200 mcg/mL) infusion Commonly known as:  PRIMACOR  
28.425 mcg/min by IntraVENous route continuous. nicotine 14 mg/24 hr patch Commonly known as:  NICODERM CQ  
1 Patch by TransDERmal route daily for 30 days. sacubitril-valsartan 24-26 mg tablet Commonly known as:  ENTRESTO Take 1 Tab by mouth two (2) times a day. spironolactone 25 mg tablet Commonly known as:  ALDACTONE Take 1 Tab by mouth daily. We Performed the Following AMB POC EKG ROUTINE W/ 12 LEADS, INTER & REP [08658 CPT(R)] To-Do List   
 03/09/2017 To Be Determined Appointment with Jossue Vera RN at [place] [address]  
  
 03/16/2017 To Be Determined Appointment with Jossue Vera RN at [place] [address]  
  
 03/23/2017 To Be Determined Appointment with Jossue Vera RN at [place] [address]  
  
 03/30/2017 To Be Determined Appointment with Jossue Vera RN at [place] [address]  
  
 04/06/2017 To Be Determined Appointment with Rich Mejía RN at [place] [address]  
  
 04/13/2017 To Be Determined Appointment with Rich Mejía RN at [place] [address]  
  
 04/20/2017 To Be Determined Appointment with Rich Mejía RN at [address] [facility]! [facility] introduces [employer] patient portal. Now you can access parts of your medical record, email your doctor's office, and request medication refills online. 1. In your internet browser, go to [email or web address] [email or web address] 2. Click on the First Time User? Click Here link in the Sign In box. You will see the New Member Sign Up page. 3. Enter your [employer] Access Code exactly as it appears below. You will not need to use this code after youve completed the sign-up process. If you do not sign up before the expiration date, you must request a new code. · [employer] Access Code: [email or web address] Expires: 4/18/2017 10:53 PM 
 
4. Enter the last four digits of your Social Security Number (xxxx) and Date of Birth (mm/dd/yyyy) as indicated and click Submit. You will be taken to the next sign-up page. 5. Create a [employer] ID. This will be your [employer] login ID and cannot be changed, so think of one that is secure and easy to remember. 6. Create a [employer] password. You can change your password at any time. 7. Enter your Password Reset Question and Answer. This can be used at a later time if you forget your password. 8. Enter your e-mail address. You will receive e-mail notification when new information is available in [address] 9. Click Sign Up. You can now view and download portions of your medical record. 10. Click the Download Summary menu link to download a portable copy of your medical information.  
 
If you have questions, please visit the Frequently Asked Questions section of the [employer]. Remember, [facility] is NOT to be used for urgent needs. For medical emergencies, dial 911. Now available from your iPhone and Android! Please provide this summary of care documentation to your next provider. Your primary care clinician is listed as NONE. If you have any questions after today's visit, please call [phone].

## 2017-03-06 NOTE — PROGRESS NOTES
Subjective:     Problem List  Date Reviewed: 3/6/2017          Codes Class Noted    Paroxysmal atrial fibrillation (HCC) ICD-10-CM: I48.0  ICD-9-CM: 427.31  3/6/2017        H/O: GI bleed ICD-10-CM: Z87.19  ICD-9-CM: V12.79  3/6/2017        Diabetes mellitus ([facility] [address] ICD-10-CM: E11.9  ICD-9-CM: 250.00  3/6/2017        Hepatitis C ICD-10-CM: B19.20  ICD-9-CM: 070.70  3/6/2017        Chronic renal insufficiency ICD-10-CM: N18.9  ICD-9-CM: 585.9  3/6/2017        Dyslipidemia ICD-10-CM: E78.5  ICD-9-CM: 272.4  3/6/2017    Overview Signed 3/6/2017  1:47 PM by MD SILVER Patricio. FLP (1/19/17): Tot 120, , HDL 19, LDL 76 (no Rx). Ischemic cardiomyopathy ICD-10-CM: I25.5  ICD-9-CM: 414.8  1/20/2017    Overview Addendum 3/6/2017  1:40 PM by MD SILVER Patricio. Echo (1/19/17):  EF 5-10% with severe GHK,. Mildly dil LA. Mild TR. PASP 46. B. Cath (1/20/17):  LM ost70. LAD m50. D1 80. LCx d70; OM1 99, OM2 90. RCA p100. No AVG. PAP  53/27/36. C.  PCI (2/9/17): pRCA 2.5x38 Xience + 2.75x12 Xience. ostLM 4.0x12 Xience post-dil with 4.5x12 NC. D.  Echo (2/13/17):  EF 10% with severe GHK, PSM. Dil LA. Mod MR. Mild to mod TR. Left pleural effusion. Mr. Paola Reaves is a 61 y.o. man with the above past medical history, who presents for follow up of his ischemic cardiomyopathy. He is doing well. He is wearing his Milrinone infusion, as well as his Life Vest.  He does some mild physical activity without any active cardiopulmonary symptoms. He does get some bilateral calf pain. He likely has some peripheral vascular disease, but this may be something to visit another day. He denies any chest pain or chest discomfort, shortness of breath, orthopnea, paroxysmal nocturnal dyspnea, lower extremity swelling, palpitations, syncope or near syncope.            History   Smoking Status    Former Smoker    Packs/day: 2.00    Years: 45.00    Quit date: 1/20/2017   Smokeless Tobacco    Not on file       Current Outpatient Prescriptions   Medication Sig Dispense Refill    DULoxetine (CYMBALTA) 30 mg capsule Take 1 Cap by mouth daily. 30 Cap 5    bumetanide (BUMEX) 2 mg tablet Take 1 Tab by mouth daily. 60 Tab 1    sacubitril-valsartan (ENTRESTO) 24 mg/26 mg tablet Take 1 Tab by mouth two (2) times a day. 60 Tab 3    amiodarone (CORDARONE) 200 mg tablet Take 1 Tab by mouth every twelve (12) hours. 60 Tab 1    aspirin delayed-release 81 mg tablet Take 1 Tab by mouth daily. 30 Tab 1    atorvastatin (LIPITOR) 10 mg tablet Take 1 Tab by mouth daily. 30 Tab 1    carvedilol (COREG) 3.125 mg tablet Take 1 Tab by mouth two (2) times daily (with meals). 60 Tab 1    clopidogrel (PLAVIX) 75 mg tab Take 1 Tab by mouth daily. 30 Tab 1    magnesium oxide (MAG-OX) 400 mg tablet Take 1 Tab by mouth daily. 30 Tab 1    nicotine (NICODERM CQ) 14 mg/24 hr patch 1 Patch by TransDERmal route daily for 30 days. 30 Patch 1    spironolactone (ALDACTONE) 25 mg tablet Take 1 Tab by mouth daily. 30 Tab 1    milrinone (PRIMACOR) 20 mg/100 mL (200 mcg/mL) infusion 28.425 mcg/min by IntraVENous route continuous. 100 mL 1    insulin NPH/insulin regular (NOVOLIN 70/30, HUMULIN 70/30) 100 unit/mL (70-30) injection 10 units w/ breakfast  8 units w/ dinner 10 mL 2    Insulin Syringes, Disposable, 1 mL syrg Pt to take 10 units w/ breakfast and 8 units w/ dinner 500 Syringe 1    gabapentin (NEURONTIN) 300 mg capsule Take 900 mg by mouth three (3) times daily. Objective:     Visit Vitals    /62 (BP 1 Location: Left arm)    Pulse 75    Resp 18    Ht 5' 10\" (1.778 m)    Wt 167 lb (75.8 kg)    SpO2 99%    BMI 23.96 kg/m2       HEENT Exam:     Normocephalic, atraumatic. EOMI. Oropharynx negative. Neck supple. No lymphadenopathy. Lung Exam:     The patient is not dyspneic. There is no cough. The lungs are clear to percussion. Breath sounds are heard equally in all lung fields.  There are no wheezes, rales, rhonchi, or rubs heard on auscultation. Heart Exam:     The rhythm is regular. The PMI is in the 5th intercostal space of the MCL. Apical impulse is normal. S1 is regular. S2 is physiologic. There is no S3, S4 gallop, murmur, click, or rub. Abdomen Exam:     Bowel sounds are normoactive. Abdomen benign. Extremities Exam:     The extremities are atraumatic appearing. There is no clubbing, cyanosis, edema, ulcers, varicose veins, rash, swelling, erythemia noted in the extremities. The neurovascular status is grossly intact with normal distal sensation and pulses. Vascular Exam:     The radial, brachial, dorsalis pedis, posterior tibial, are equal and strong bilaterally The carotids are equal bilaterally without bruits. EKG: NSR @ 75, IVCD, NSSTTW abn. Assessment/Plan:       Mr. Yury Kirby appears stable from a cardiac standpoint. We are going to stay the course with his current medication regimen, and he is going to follow up with me in five months time. We had a discussion with to his Milrinone drip, as well as his Life Vest.  It is likely that Dr. Sunny Benitez will perform a transthoracic echocardiogram in the future to reassess his left ventricular systolic function and make assessment with regard to ICD placement and/or discontinuation of Milrinone. Plan:  1. Continue outpatient medication regimen. 2. Follow up with me in five months time. 3. Diet and exercise. 4. Call my office, call his primary care physician, or return to the hospital should any concerning symptomatology arise. Mr. Yury Kirby indicated that he understood this plan and wished to proceed ahead.           Patient Care Team:  None as PCP - Luis Shi MD as Referring Provider (Cardiology)  Derek Elmore MD as Surgeon (Cardiothoracic Surgery)  Nimesh Gautam MD (Cardiology)  Daniel Hodges MD (Nephrology)

## 2017-03-06 NOTE — PATIENT INSTRUCTIONS
Drybar Activation    Thank you for requesting access to Drybar. Please follow the instructions below to securely access and download your online medical record. Drybar allows you to send messages to your doctor, view your test results, renew your prescriptions, schedule appointments, and more. How Do I Sign Up? 1. In your internet browser, go to [email or web address]  2. Click on the First Time User? Click Here link in the Sign In box. You will be redirect to the New Member Sign Up page. 3. Enter your Drybar Access Code exactly as it appears below. You will not need to use this code after youve completed the sign-up process. If you do not sign up before the expiration date, you must request a new code. Drybar Access Code: [email or web address]  Expires: 2017 10:53 PM (This is the date your Drybar access code will )    4. Enter the last four digits of your Social Security Number (xxxx) and Date of Birth (mm/dd/yyyy) as indicated and click Submit. You will be taken to the next sign-up page. 5. Create a Drybar ID. This will be your Drybar login ID and cannot be changed, so think of one that is secure and easy to remember. 6. Create a Drybar password. You can change your password at any time. 7. Enter your Password Reset Question and Answer. This can be used at a later time if you forget your password. 8. Enter your e-mail address. You will receive e-mail notification when new information is available in [address]. 9. Click Sign Up. You can now view and download portions of your medical record. 10. Click the Download Summary menu link to download a portable copy of your medical information. Additional Information    If you have questions, please visit the Frequently Asked Questions section of the Drybar website at [email or web address] Remember, Drybar is NOT to be used for urgent needs. For medical emergencies, dial 911.            Atrial Fibrillation: Care Instructions  Your Care Instructions    Atrial fibrillation is an irregular and often fast heartbeat. Treating this condition is important for several reasons. It can cause blood clots, which can travel from your heart to your brain and cause a stroke. If you have a fast heartbeat, you may feel lightheaded, dizzy, and weak. An irregular heartbeat can also increase your risk for heart failure. Atrial fibrillation is often the result of another heart condition, such as high blood pressure or coronary artery disease. Making changes to improve your heart condition will help you stay healthy and active. Follow-up care is a key part of your treatment and safety. Be sure to make and go to all appointments, and call your doctor if you are having problems. It's also a good idea to know your test results and keep a list of the medicines you take. How can you care for yourself at home? Medicines  · Take your medicines exactly as prescribed. Call your doctor if you think you are having a problem with your medicine. You will get more details on the specific medicines your doctor prescribes. · If your doctor has given you a blood thinner to prevent a stroke, be sure you get instructions about how to take your medicine safely. Blood thinners can cause serious bleeding problems. · Do not take any vitamins, over-the-counter drugs, or herbal products without talking to your doctor first.  Lifestyle changes  · Do not smoke. Smoking can increase your chance of a stroke and heart attack. If you need help quitting, talk to your doctor about stop-smoking programs and medicines. These can increase your chances of quitting for good. · Eat a heart-healthy diet. · Stay at a healthy weight. Lose weight if you need to. · Limit alcohol to 2 drinks a day for men and 1 drink a day for women. Too much alcohol can cause health problems. · Avoid colds and flu. Get a pneumococcal vaccine shot.  If you have had one before, ask your doctor whether you need another dose. Get a flu shot every year. If you must be around people with colds or flu, wash your hands often. Activity  · If your doctor recommends it, get more exercise. Walking is a good choice. Bit by bit, increase the amount you walk every day. Try for at least 30 minutes on most days of the week. You also may want to swim, bike, or do other activities. Your doctor may suggest that you join a cardiac rehabilitation program so that you can have help increasing your physical activity safely. · Start light exercise if your doctor says it is okay. Even a small amount will help you get stronger, have more energy, and manage stress. Walking is an easy way to get exercise. Start out by walking a little more than you did in the hospital. Gradually increase the amount you walk. · When you exercise, watch for signs that your heart is working too hard. You are pushing too hard if you cannot talk while you are exercising. If you become short of breath or dizzy or have chest pain, sit down and rest immediately. · Check your pulse regularly. Place two fingers on the artery at the palm side of your wrist, in line with your thumb. If your heartbeat seems uneven or fast, talk to your doctor. When should you call for help? Call 911 anytime you think you may need emergency care. For example, call if:  · You have symptoms of a heart attack. These may include:  ¨ Chest pain or pressure, or a strange feeling in the chest.  ¨ Sweating. ¨ Shortness of breath. ¨ Nausea or vomiting. ¨ Pain, pressure, or a strange feeling in the back, neck, jaw, or upper belly or in one or both shoulders or arms. ¨ Lightheadedness or sudden weakness. ¨ A fast or irregular heartbeat. After you call 911, the  may tell you to chew 1 adult-strength or 2 to 4 low-dose aspirin. Wait for an ambulance. Do not try to drive yourself. · You have symptoms of a stroke.  These may include:  ¨ Sudden numbness, tingling, weakness, or loss of movement in your face, arm, or leg, especially on only one side of your body. ¨ Sudden vision changes. ¨ Sudden trouble speaking. ¨ Sudden confusion or trouble understanding simple statements. ¨ Sudden problems with walking or balance. ¨ A sudden, severe headache that is different from past headaches. · You passed out (lost consciousness). Call your doctor now or seek immediate medical care if:  · You have new or increased shortness of breath. · You feel dizzy or lightheaded, or you feel like you may faint. · Your heart rate becomes irregular. · You can feel your heart flutter in your chest or skip heartbeats. Tell your doctor if these symptoms are new or worse. Watch closely for changes in your health, and be sure to contact your doctor if you have any problems. Where can you learn more? Go to http://erik-dianna.info/. Enter U020 in the search box to learn more about \"Atrial Fibrillation: Care Instructions. \"  Current as of: January 27, 2016  Content Version: 11.1  © 5362-8032 [employer]tleData. Care instructions adapted under license by [employer] (which disclaims liability or warranty for this information). If you have questions about a medical condition or this instruction, always ask your healthcare professional. [address] any warranty or liability for your use of this information.

## 2017-03-09 ENCOUNTER — TELEPHONE (OUTPATIENT)
Dept: CARDIOLOGY CLINIC | Age: 67
End: 2017-03-09

## 2017-03-09 ENCOUNTER — TELEPHONE (OUTPATIENT)
Dept: CARDIOTHORACIC SURGERY | Age: 67
End: 2017-03-09

## 2017-03-09 ENCOUNTER — HOME CARE VISIT (OUTPATIENT)
Dept: SCHEDULING | Facility: HOME HEALTH | Age: 67
End: 2017-03-09

## 2017-03-09 VITALS
DIASTOLIC BLOOD PRESSURE: 64 MMHG | HEART RATE: 79 BPM | TEMPERATURE: 97.9 F | OXYGEN SATURATION: 99 % | SYSTOLIC BLOOD PRESSURE: 104 MMHG

## 2017-03-09 PROCEDURE — G0299 HHS/HOSPICE OF RN EA 15 MIN: HCPCS

## 2017-03-09 NOTE — TELEPHONE ENCOUNTER
Received lab results from Dr. Landon Been office,  They did not include labs ordered by Dr. Gui Green,  Left  for [facility] C Replaced by [facility] (home health nurse) that the labs ordered by Dr. Gui Green will have to be drawn as well and to call.

## 2017-03-09 NOTE — TELEPHONE ENCOUNTER
Left 2nd VM for Farideh (Dr Bharathi Johnson nurse) stating that we would like a copy of labs sent to office.

## 2017-03-10 ENCOUNTER — TELEPHONE (OUTPATIENT)
Dept: CARDIOLOGY CLINIC | Age: 67
End: 2017-03-10

## 2017-03-10 ENCOUNTER — HOME CARE VISIT (OUTPATIENT)
Dept: SCHEDULING | Facility: HOME HEALTH | Age: 67
End: 2017-03-10

## 2017-03-10 PROCEDURE — G0299 HHS/HOSPICE OF RN EA 15 MIN: HCPCS

## 2017-03-10 NOTE — TELEPHONE ENCOUNTER
Spoke with patient to schedule his next follow up with Dr. Dylan Villela.     Patient is scheduled for Tuesday March 14th

## 2017-03-10 NOTE — TELEPHONE ENCOUNTER
Received call from [facility] regarding labs for patient. She will draw his Pro-BNP and fax results to our office prior to patient's appointment with Dr. Fernanda Vasquez.

## 2017-03-12 VITALS
RESPIRATION RATE: 18 BRPM | DIASTOLIC BLOOD PRESSURE: 58 MMHG | OXYGEN SATURATION: 98 % | TEMPERATURE: 98 F | HEART RATE: 76 BPM | SYSTOLIC BLOOD PRESSURE: 92 MMHG

## 2017-03-14 ENCOUNTER — OFFICE VISIT (OUTPATIENT)
Dept: CARDIOLOGY CLINIC | Age: 67
End: 2017-03-14

## 2017-03-14 VITALS
SYSTOLIC BLOOD PRESSURE: 110 MMHG | HEIGHT: 70 IN | DIASTOLIC BLOOD PRESSURE: 62 MMHG | HEART RATE: 74 BPM | OXYGEN SATURATION: 99 % | TEMPERATURE: 97.6 F | WEIGHT: 163.4 LBS | BODY MASS INDEX: 23.39 KG/M2

## 2017-03-14 DIAGNOSIS — K92.2 LOWER GI BLEEDING: ICD-10-CM

## 2017-03-14 DIAGNOSIS — I25.5 ISCHEMIC CARDIOMYOPATHY: ICD-10-CM

## 2017-03-14 DIAGNOSIS — I73.9 CLAUDICATION (HCC): Primary | ICD-10-CM

## 2017-03-14 DIAGNOSIS — E11.40 CONTROLLED TYPE 2 DIABETES MELLITUS WITH DIABETIC NEUROPATHY, WITHOUT LONG-TERM CURRENT USE OF INSULIN (HCC): ICD-10-CM

## 2017-03-14 DIAGNOSIS — I50.41 ACUTE COMBINED SYSTOLIC AND DIASTOLIC ACC/AHA STAGE C CONGESTIVE HEART FAILURE (HCC): ICD-10-CM

## 2017-03-14 DIAGNOSIS — E13.40 NEUROPATHY DUE TO SECONDARY DIABETES (HCC): ICD-10-CM

## 2017-03-14 RX ORDER — NICOTINE 7MG/24HR
1 PATCH, TRANSDERMAL 24 HOURS TRANSDERMAL EVERY 24 HOURS
Qty: 30 PATCH | Refills: 3 | Status: SHIPPED | OUTPATIENT
Start: 2017-03-14 | End: 2017-04-13

## 2017-03-14 RX ORDER — BUMETANIDE 2 MG/1
1 TABLET ORAL DAILY
Qty: 60 TAB | Refills: 1 | Status: ON HOLD | OUTPATIENT
Start: 2017-03-14 | End: 2017-06-12 | Stop reason: CLARIF

## 2017-03-14 NOTE — LETTER
3/14/2017 10:21 PM 
 
Patient:  Chelle Paulson. YOB: 1950 Date of Visit: 3/14/2017 Dear Cindy Holliday MD 
[facility] [address] [id number] VIA Facsimile: [phone] Suzan Ariza MD 
[address] [facility] [phone] [place] [address] VIA In Basket 
 : Thank you for referring Mr. Kateryna Grossman to me for evaluation/treatment. Below are the relevant portions of my assessment and plan of care. [facility]:  Chelle Paulson. :   1950 MRN:   [id number] PCP:  None Date:  2017 IMPRESSION/PLAN: 
 
ICM, EF 10%, home inotrope - cont. Milrinone, Entresto, Coreg, and Spironolactone Plan to increase Entresto at next visit - last creat was 1.81 on 3/8/17 Decrease Bumex to 1 mg daily Will repeat echo towards the end of April first of May - last one was 17 - to re-evaluate EF and determine if      ICD is appropriate Pt To continue with Life Vest 
Pt to return to clinic in 4 weeks, will check labs prior to that visit CAD s/p PCI ( of RCA, Left main): continue ASA, Plavix, Coreg and statin Claudication:  Check ABIs CKD:  Followed by renal, trend renal fxn PAF: remains in SR, cont amiodarone H/O lower GIB:  Will need colonic polyp and AVM treated at some point - denies melena H/O Tobacco Abuse:  Pt doing well with nicotine patch, states no urge to smoke, will decrease patch to 7 mg/hr Diabetes: on insulin, management by PCP Diabetic neuropathy: cont. neurontin 300 mg tid and Cymbalta 30 mg daily Hepatitis C: undetectable viral load, will repeat HCV RNA with next lab draw per hepatologist recommendations Subjective:  
 
Mr. Janell Ware is a 60 yo, C male w/ PMH CAD s/p PCI to left main and  of RCA 0n 17,  ICM (LVEF 10%), PAF, nicotine addiction, Hep C (undetectable viral load), h/o GIB w/ colonic polyp and AVM, who presents today for follow up. He reports overall he is doing better. He is doing well on the milrinone. He is compliant with wearing his LifeVest.  He denies any shocks. He is not smoking and doing well on the nicotine patch, he feels we can decrease the dosage. He denies drug or alcohol use. He is compliant with all his medications. He recently was seen by Dr. Radha Mcintosh and told everything was ok. He is performing daily weights and maintaining between 157-158 lbs. He is watching his sodium and fluid intake. He is sleeping on one pillow and denies orthopnea. He no longer is sleeping in the recliner and is able to lay flat without SOB. When he walks a long way, his calves ache from exertion - he said this is different than his neuropathy pain. He reports the Cymbalta and increased Neurontin has helped with his neuropathy. Pt c/o lightheadedness, non productive cough, and claudication. Pt denies fatigue, fevers, chills, dizziness, syncope, headaches, visual changes, N/V, changes to his appetite, changes to his weight, early satiety, chest pain, palpitations, SOB, constipation/diarrhea, edema, orthopena, melena, or BRBPR, depression or anxiety. Objective:  
 
Visit Vitals  /62 (BP 1 Location: Right arm, BP Patient Position: Sitting)  Pulse 74  Temp 97.6 °F (36.4 °C) (Oral)  Ht 5' 10\" (1.778 m)  Wt 163 lb 6.4 oz (74.1 kg)  SpO2 99%  BMI 23.45 kg/m2 Gen:   WA, WN, NAD HEENT:  EOMI, no obvious oral lesions Neck:  supple, (-) adenopathy (-) JVD 
CVS:     Distant heart sounds, S1/S2, S3 gallop Pul:  CTA b/l (-) r,r,w 
Abd:  +BS, soft, NT Ext:  (-) edema Neuro:   No obvious deficits Past History:  
 
Past Medical History:  
Diagnosis Date  [facility] 1/20/2017 A. Echo (1/19/17):  EF 5-10% with severe GHK,. Mildly dil LA. Mild TR. PASP 46.  
 Chronic renal insufficiency 3/6/2017  Diabetes mellitus ([facility] 3/6/2017  Dyslipidemia 3/6/2017 A. FLP (1/19/17): Tot 120, , HDL 19, LDL 76 (no Rx).  H/O: GI bleed 3/6/2017  Hepatitis C 3/6/2017  Paroxysmal atrial fibrillation ([address] 3/6/2017 Past Surgical History:  
Procedure Laterality Date  COLONOSCOPY N/A 2/17/2017 COLONOSCOPY performed by Sameer Ya. Yang Harris MD at [facility] ENDOSCOPY Social History Substance Use Topics  Smoking status: Former Smoker Packs/day: 2.00 Years: 45.00 Quit date: 1/20/2017  Smokeless tobacco: Not on file  Alcohol use No  
  
 
No family history on file. Allergies: Allergies Allergen Reactions  Heparin (Porcine) Unknown (comments) Data Review:  
 
 
CMP:  
Lab Results Component Value Date/Time Glucose 204 02/27/2017 01:30 PM  
 Sodium 134 02/27/2017 01:30 PM  
 Potassium 4.6 02/27/2017 01:30 PM  
 Chloride 93 02/27/2017 01:30 PM  
 CO2 22 02/27/2017 01:30 PM  
 BUN 31 02/27/2017 01:30 PM  
 Creatinine 1.64 02/27/2017 01:30 PM  
 Calcium 9.2 02/27/2017 01:30 PM  
 Anion gap 9 02/22/2017 03:16 AM  
 BUN/Creatinine ratio 19 02/27/2017 01:30 PM  
 Bilirubin, total 0.5 02/22/2017 03:16 AM  
 ALT (SGPT) 21 02/22/2017 03:16 AM  
 AST (SGOT) 14 02/22/2017 03:16 AM  
 Alk. phosphatase 99 02/22/2017 03:16 AM  
 Protein, total 7.3 02/22/2017 03:16 AM  
  02/14/2017 03:03 AM  
 Albumin 2.9 02/22/2017 03:16 AM  
 Globulin 4.4 02/22/2017 03:16 AM  
 A-G Ratio 0.7 02/22/2017 03:16 AM  
, PHOS:  
Lab Results Component Value Date/Time Phosphorus 2.5 01/23/2017 12:09 PM  
 
 
Medications reviewed: 
 
Current Outpatient Prescriptions Medication Sig  
 sacubitril-valsartan (ENTRESTO) 24 mg/26 mg tablet Take 1 Tab by mouth two (2) times a day.  bumetanide (BUMEX) 2 mg tablet Take 0.5 Tabs by mouth daily.  nicotine (NICODERM CQ) 7 mg/24 hr 1 Patch by TransDERmal route every twenty-four (24) hours for 30 days.   
 gabapentin (NEURONTIN) 300 mg capsule Take 900 mg by mouth three (3) times daily.  DULoxetine (CYMBALTA) 30 mg capsule Take 1 Cap by mouth daily.  sacubitril-valsartan (ENTRESTO) 24 mg/26 mg tablet Take 1 Tab by mouth two (2) times a day.  amiodarone (CORDARONE) 200 mg tablet Take 1 Tab by mouth every twelve (12) hours.  aspirin delayed-release 81 mg tablet Take 1 Tab by mouth daily.  atorvastatin (LIPITOR) 10 mg tablet Take 1 Tab by mouth daily.  carvedilol (COREG) 3.125 mg tablet Take 1 Tab by mouth two (2) times daily (with meals).  clopidogrel (PLAVIX) 75 mg tab Take 1 Tab by mouth daily.  magnesium oxide (MAG-OX) 400 mg tablet Take 1 Tab by mouth daily.  nicotine (NICODERM CQ) 14 mg/24 hr patch 1 Patch by TransDERmal route daily for 30 days.  spironolactone (ALDACTONE) 25 mg tablet Take 1 Tab by mouth daily.  milrinone (PRIMACOR) 20 mg/100 mL (200 mcg/mL) infusion 28.425 mcg/min by IntraVENous route continuous.  insulin NPH/insulin regular (NOVOLIN 70/30, HUMULIN 70/30) 100 unit/mL (70-30) injection 10 units w/ breakfast 
8 units w/ dinner  Insulin Syringes, Disposable, 1 mL syrg Pt to take 10 units w/ breakfast and 8 units w/ dinner No current facility-administered medications for this visit. Pt seen under the direct supervision of KENYETTA Zaragoza [id number] If you have questions, please do not hesitate to call me. I look forward to following Mr. Moseleyanahy Clementeosman along with you. Sincerely, Yanely Ellis MD

## 2017-03-14 NOTE — MR AVS SNAPSHOT
Visit Information Date & Time Provider Department Dept. Phone Encounter #  
 3/14/2017  9:00 AM Arturo Melgar MD [address] [id number] Your Appointments 8/15/2017  3:00 PM  
ESTABLISHED PATIENT with Narciso Lyons MD  
[facility] (S[facility] SCHEDULING) Appt Note: 5 mo fup  
 [address] [facility] [address]  
[address] [place] [address] [id number] [place] [address] Upcoming Health Maintenance Date Due  
 FOOT EXAM Q1 11/23/1962 MICROALBUMIN Q1 11/23/1962 EYE EXAM RETINAL OR DILATED Q1 11/23/1962 Pneumococcal 19-64 Medium Risk (1 of 1 - PPSV23) 11/23/1971 DTaP/Tdap/Td series (1 - Tdap) 11/23/1973 ZOSTER VACCINE AGE 60> 11/23/2012 INFLUENZA AGE 9 TO ADULT 8/1/2016 HEMOGLOBIN A1C Q6M 7/19/2017 LIPID PANEL Q1 1/19/2018 FOBT Q 1 YEAR AGE 50-75 2/16/2018 Allergies as of 3/14/2017  Review Complete On: 3/23/9555 By: GREGORY Ingram Severity Noted Reaction Type Reactions Heparin (Porcine)  02/07/2017    Unknown (comments) Current Immunizations  Reviewed on 2/14/2017 No immunizations on file. Not reviewed this visit You Were Diagnosed With   
  
 Codes Comments Claudication [facility])    -  Primary ICD-10-CM: I73.9 ICD-9-CM: 443.9 Ischemic cardiomyopathy     ICD-10-CM: I25.5 ICD-9-CM: 414.8 Acute combined systolic and diastolic ACC/AHA stage C congestive heart failure (HCC)     ICD-10-CM: I50.41 ICD-9-CM: 428.41, 428.0 Neuropathy due to secondary diabetes ([facility]     ICD-10-CM: E13.40 ICD-9-CM: 249.60, 357.2 Controlled type 2 diabetes mellitus with diabetic neuropathy, without long-term current use of insulin (HCC)     ICD-10-CM: E11.40 ICD-9-CM: 250.60, 357.2 Lower GI bleeding     ICD-10-CM: K92.2 ICD-9-CM: 578.9 Vitals BP Pulse Temp Height(growth percentile) Weight(growth percentile) SpO2  
 110/62 (BP 1 Location: Right arm, BP Patient Position: Sitting) 74 97.6 °F (36.4 °C) (Oral) 5' 10\" (1.778 m) 163 lb 6.4 oz (74.1 kg) 99% BMI Smoking Status 23.45 kg/m2 Former Smoker BMI and BSA Data Body Mass Index Body Surface Area  
 23.45 kg/m 2 1.91 m 2 Preferred Pharmacy Pharmacy Name Phone [facility] Leonor [facility] [place] [phone] Your Updated Medication List  
  
   
This list is accurate as of: 3/14/17 10:15 AM.  Always use your most recent med list.  
  
  
  
  
 amiodarone 200 mg tablet Commonly known as:  CORDARONE Take 1 Tab by mouth every twelve (12) hours. aspirin delayed-release 81 mg tablet Take 1 Tab by mouth daily. atorvastatin 10 mg tablet Commonly known as:  LIPITOR Take 1 Tab by mouth daily. bumetanide 2 mg tablet Commonly known as:  Alecia Baar Take 0.5 Tabs by mouth daily. carvedilol 3.125 mg tablet Commonly known as:  Stanford Lawman Take 1 Tab by mouth two (2) times daily (with meals). clopidogrel 75 mg Tab Commonly known as:  PLAVIX Take 1 Tab by mouth daily. DULoxetine 30 mg capsule Commonly known as:  CYMBALTA Take 1 Cap by mouth daily. gabapentin 300 mg capsule Commonly known as:  NEURONTIN Take 900 mg by mouth three (3) times daily. insulin NPH/insulin regular 100 unit/mL (70-30) injection Commonly known as:  NOVOLIN 70/30, HUMULIN 70/30  
10 units w/ breakfast 8 units w/ dinner Insulin Syringes (Disposable) 1 mL Syrg Pt to take 10 units w/ breakfast and 8 units w/ dinner  
  
 magnesium oxide 400 mg tablet Commonly known as:  MAG-OX Take 1 Tab by mouth daily. milrinone 20 mg/100 mL (200 mcg/mL) infusion Commonly known as:  PRIMACOR  
28.425 mcg/min by IntraVENous route continuous. * nicotine 14 mg/24 hr patch Commonly known as:  NICODERM CQ  
1 Patch by TransDERmal route daily for 30 days. * nicotine 7 mg/24 hr  
Commonly known as:  NICODERM CQ  
1 Patch by TransDERmal route every twenty-four (24) hours for 30 days. * sacubitril-valsartan 24-26 mg tablet Commonly known as:  ENTRESTO Take 1 Tab by mouth two (2) times a day. * sacubitril-valsartan 24-26 mg tablet Commonly known as:  ENTRESTO Take 1 Tab by mouth two (2) times a day. spironolactone 25 mg tablet Commonly known as:  ALDACTONE Take 1 Tab by mouth daily. * Notice: This list has 4 medication(s) that are the same as other medications prescribed for you. Read the directions carefully, and ask your doctor or other care provider to review them with you. Prescriptions Sent to Pharmacy Refills  
 bumetanide (BUMEX) 2 mg tablet 1 Sig: Take 0.5 Tabs by mouth daily. Class: Normal  
 Pharmacy: [address] Ph #: [phone] Route: Oral  
 nicotine (NICODERM CQ) 7 mg/24 hr 3 Si Patch by TransDERmal route every twenty-four (24) hours for 30 days. Class: Normal  
 Pharmacy: [place] [id number] [address] [place] [address] Ph #: [phone] Route: TransDERmal  
  
To-Do List   
 2017 Imaging:  ANKLE BRACHIAL INDEX   
  
 2017 To Be Determined Appointment with lA Valenzuela RN at [place] [address]  
  
 2017 To Be Determined Appointment with Al Valenzuela RN at [place] [address]  
  
 2017 To Be Determined Appointment with Al Valenzuela RN at [place] [address]  
  
 2017 To Be Determined Appointment with Al Valenzuela RN at [place] [address]  
  
 2017 To Be Determined   Appointment with Al Valenzuela RN at [place] [address]  
  
 04/20/2017 To Be Determined Appointment with María Garcia RN at [place] [address] Patient Instructions 1. Decrease Bumex to 1 mg every day 2. Continue all medications as ordered. 3.  Will order RICKY (testing for your leg pain) 4. Continue w/ daily weights, if weight increases let us know 5. Will get labs prior to next visit 6. Return to clinic in 4 weeks 7. Decrease nicotine patch to 7 mg/hr Introducing [facility]! [address] introduces Prime Grid patient portal. Now you can access parts of your medical record, email your doctor's office, and request medication refills online. 1. In your internet browser, go to [email or web address] 2. Click on the First Time User? Click Here link in the Sign In box. You will see the New Member Sign Up page. 3. Enter your Prime Grid Access Code exactly as it appears below. You will not need to use this code after youve completed the sign-up process. If you do not sign up before the expiration date, you must request a new code. · Prime Grid Access Code: [email or web address] Expires: 4/18/2017 11:53 PM 
 
4. Enter the last four digits of your Social Security Number (xxxx) and Date of Birth (mm/dd/yyyy) as indicated and click Submit. You will be taken to the next sign-up page. 5. Create a Prime Grid ID. This will be your Prime Grid login ID and cannot be changed, so think of one that is secure and easy to remember. 6. Create a Prime Grid password. You can change your password at any time. 7. Enter your Password Reset Question and Answer. This can be used at a later time if you forget your password. 8. Enter your e-mail address. You will receive e-mail notification when new information is available in [address] 9. Click Sign Up. You can now view and download portions of your medical record. 10. Click the Download Summary menu link to download a portable copy of your medical information. If you have questions, please visit the Frequently Asked Questions section of the H[employer] website. Remember, [employer] is NOT to be used for urgent needs. For medical emergencies, dial 911. Now available from your iPhone and Android! Please provide this summary of care documentation to your next provider. Your primary care clinician is listed as NONE. If you have any questions after today's visit, please call [phone].

## 2017-03-14 NOTE — PROGRESS NOTES
[facility] Clinic Note      NAME:  Fernanda Terry. :   1950   MRN:   [id number]   PCP:  None    Date:  2017     IMPRESSION/PLAN:    ICM, EF 10%, home inotrope - cont. Milrinone, Entresto, Coreg, and Spironolactone   Plan to increase Entresto at next visit - last creat was 1.81 on 3/8/17  Decrease Bumex to 1 mg daily  Will repeat echo towards the end of April first of May - last one was 17 - to re-evaluate EF and determine if      ICD is appropriate  Pt To continue with Life Vest  Pt to return to clinic in 4 weeks, will check labs prior to that visit    CAD s/p PCI ( of RCA, Left main): continue ASA, Plavix, Coreg and statin          Claudication:  Check ABIs    CKD:  Followed by renal, trend renal fxn    PAF: remains in SR, cont amiodarone    H/O lower GIB:  Will need colonic polyp and AVM treated at some point - denies melena    H/O Tobacco Abuse:  Pt doing well with nicotine patch, states no urge to smoke, will decrease patch to 7 mg/hr    Diabetes: on insulin, management by PCP    Diabetic neuropathy: cont. neurontin 300 mg tid and Cymbalta 30 mg daily        Hepatitis C: undetectable viral load, will repeat HCV RNA with next lab draw per hepatologist recommendations        Subjective:     Mr. Alvaro Snyder is a 60 yo, C male w/ PMH CAD s/p PCI to left main and  of RCA 0n 17,  ICM (LVEF 10%), PAF, nicotine addiction, Hep C (undetectable viral load), h/o GIB w/ colonic polyp and AVM, who presents today for follow up. He reports overall he is doing better. He is doing well on the milrinone. He is compliant with wearing his LifeVest.  He denies any shocks. He is not smoking and doing well on the nicotine patch, he feels we can decrease the dosage. He denies drug or alcohol use. He is compliant with all his medications. He recently was seen by Dr. Raymundo De Los Santos and told everything was ok. He is performing daily weights and maintaining between 157-158 lbs.   He is watching his sodium and fluid intake. He is sleeping on one pillow and denies orthopnea. He no longer is sleeping in the recliner and is able to lay flat without SOB. When he walks a long way, his calves ache from exertion - he said this is different than his neuropathy pain. He reports the Cymbalta and increased Neurontin has helped with his neuropathy. Pt c/o lightheadedness, non productive cough, and claudication. Pt denies fatigue, fevers, chills, dizziness, syncope, headaches, visual changes, N/V, changes to his appetite, changes to his weight, early satiety, chest pain, palpitations, SOB, constipation/diarrhea, edema, orthopena, melena, or BRBPR, depression or anxiety. Objective:     Visit Vitals    /62 (BP 1 Location: Right arm, BP Patient Position: Sitting)    Pulse 74    Temp 97.6 °F (36.4 °C) (Oral)    Ht 5' 10\" (1.778 m)    Wt 163 lb 6.4 oz (74.1 kg)    SpO2 99%    BMI 23.45 kg/m2          Gen:   WA, WN, NAD  HEENT:  EOMI, no obvious oral lesions  Neck:  supple, (-) adenopathy (-) JVD  CVS:     Distant heart sounds, S1/S2, S3 gallop  Pul:  CTA b/l (-) r,r,w  Abd:  +BS, soft, NT  Ext:  (-) edema  Neuro:   No obvious deficits           Past History:     Past Medical History:   Diagnosis Date    Cardiomyopathy ([address] 1/20/2017    A. Echo (1/19/17):  EF 5-10% with severe GHK,. Mildly dil LA. Mild TR. PASP 46.    Chronic renal insufficiency 3/6/2017    Diabetes mellitus ([address] 3/6/2017    Dyslipidemia 3/6/2017    A. FLP (1/19/17): Tot 120, , HDL 19, LDL 76 (no Rx).  H/O: GI bleed 3/6/2017    Hepatitis C 3/6/2017    Paroxysmal atrial fibrillation ([address] 3/6/2017       Past Surgical History:   Procedure Laterality Date    COLONOSCOPY N/A 2/17/2017    COLONOSCOPY performed by Obed Myers.  Yulia Nails MD at [facility]       Social History   Substance Use Topics    Smoking status: Former Smoker     Packs/day: 2.00     Years: 45.00     Quit date: 1/20/2017    Smokeless tobacco: Not on file    Alcohol use No        No family history on file. Allergies: Allergies   Allergen Reactions    Heparin (Porcine) Unknown (comments)        Data Review:       CMP:   Lab Results   Component Value Date/Time    Glucose 204 02/27/2017 01:30 PM    Sodium 134 02/27/2017 01:30 PM    Potassium 4.6 02/27/2017 01:30 PM    Chloride 93 02/27/2017 01:30 PM    CO2 22 02/27/2017 01:30 PM    BUN 31 02/27/2017 01:30 PM    Creatinine 1.64 02/27/2017 01:30 PM    Calcium 9.2 02/27/2017 01:30 PM    Anion gap 9 02/22/2017 03:16 AM    BUN/Creatinine ratio 19 02/27/2017 01:30 PM    Bilirubin, total 0.5 02/22/2017 03:16 AM    ALT (SGPT) 21 02/22/2017 03:16 AM    AST (SGOT) 14 02/22/2017 03:16 AM    Alk. phosphatase 99 02/22/2017 03:16 AM    Protein, total 7.3 02/22/2017 03:16 AM     02/14/2017 03:03 AM    Albumin 2.9 02/22/2017 03:16 AM    Globulin 4.4 02/22/2017 03:16 AM    A-G Ratio 0.7 02/22/2017 03:16 AM   , PHOS:   Lab Results   Component Value Date/Time    Phosphorus 2.5 01/23/2017 12:09 PM       Medications reviewed:    Current Outpatient Prescriptions   Medication Sig    sacubitril-valsartan (ENTRESTO) 24 mg/26 mg tablet Take 1 Tab by mouth two (2) times a day.  bumetanide (BUMEX) 2 mg tablet Take 0.5 Tabs by mouth daily.  nicotine (NICODERM CQ) 7 mg/24 hr 1 Patch by TransDERmal route every twenty-four (24) hours for 30 days.  gabapentin (NEURONTIN) 300 mg capsule Take 900 mg by mouth three (3) times daily.  DULoxetine (CYMBALTA) 30 mg capsule Take 1 Cap by mouth daily.  sacubitril-valsartan (ENTRESTO) 24 mg/26 mg tablet Take 1 Tab by mouth two (2) times a day.  amiodarone (CORDARONE) 200 mg tablet Take 1 Tab by mouth every twelve (12) hours.  aspirin delayed-release 81 mg tablet Take 1 Tab by mouth daily.  atorvastatin (LIPITOR) 10 mg tablet Take 1 Tab by mouth daily.  carvedilol (COREG) 3.125 mg tablet Take 1 Tab by mouth two (2) times daily (with meals).  clopidogrel (PLAVIX) 75 mg tab Take 1 Tab by mouth daily.  magnesium oxide (MAG-OX) 400 mg tablet Take 1 Tab by mouth daily.  nicotine (NICODERM CQ) 14 mg/24 hr patch 1 Patch by TransDERmal route daily for 30 days.  spironolactone (ALDACTONE) 25 mg tablet Take 1 Tab by mouth daily.  milrinone (PRIMACOR) 20 mg/100 mL (200 mcg/mL) infusion 28.425 mcg/min by IntraVENous route continuous.  insulin NPH/insulin regular (NOVOLIN 70/30, HUMULIN 70/30) 100 unit/mL (70-30) injection 10 units w/ breakfast  8 units w/ dinner    Insulin Syringes, Disposable, 1 mL syrg Pt to take 10 units w/ breakfast and 8 units w/ dinner     No current facility-administered medications for this visit.         Pt seen under the direct supervision of Dr. Jamey Khan, PA-C  Jennifer Roman [phone]

## 2017-03-14 NOTE — PATIENT INSTRUCTIONS
1.  Decrease Bumex to 1 mg every day    2. Continue all medications as ordered. 3.  Will order RICKY (testing for your leg pain)    4. Continue w/ daily weights, if weight increases let us know    5. Will get labs prior to next visit    6. Return to clinic in 4 weeks    7.   Decrease nicotine patch to 7 mg/hr

## 2017-03-15 ENCOUNTER — TELEPHONE (OUTPATIENT)
Dept: CARDIOLOGY CLINIC | Age: 67
End: 2017-03-15

## 2017-03-15 NOTE — TELEPHONE ENCOUNTER
----- Message from Josemanuel Blount sent at 3/15/2017  9:06 AM EDT -----  Deejay Alas, can you handle this ? Thanks,    Rick Cruz       ----- Message -----     From: GREGORY Eqsueda     Sent: 3/14/2017  10:11 PM       To: Anaya Resendez    I have scheduled this patient to have ABIs - can we please get them scheduled?     Mika Aguilar

## 2017-03-15 NOTE — COMMUNICATION BODY
[facility] Clinic Note      NAME:  Fernanda Acharya. :   1950   MRN:   [id number]   PCP:  None    Date:  2017     IMPRESSION/PLAN:    ICM, EF 10%, home inotrope - cont. Milrinone, Entresto, Coreg, and Spironolactone   Plan to increase Entresto at next visit - last creat was 1.81 on 3/8/17  Decrease Bumex to 1 mg daily  Will repeat echo towards the end of April first of May - last one was 17 - to re-evaluate EF and determine if      ICD is appropriate  Pt To continue with Life Vest  Pt to return to clinic in 4 weeks, will check labs prior to that visit    CAD s/p PCI ( of RCA, Left main): continue ASA, Plavix, Coreg and statin          Claudication:  Check ABIs    CKD:  Followed by renal, trend renal fxn    PAF: remains in SR, cont amiodarone    H/O lower GIB:  Will need colonic polyp and AVM treated at some point - denies melena    H/O Tobacco Abuse:  Pt doing well with nicotine patch, states no urge to smoke, will decrease patch to 7 mg/hr    Diabetes: on insulin, management by PCP    Diabetic neuropathy: cont. neurontin 300 mg tid and Cymbalta 30 mg daily        Hepatitis C: undetectable viral load, will repeat HCV RNA with next lab draw per hepatologist recommendations        Subjective:     Mr. Ivania Gottlieb is a 60 yo, C male w/ PMH CAD s/p PCI to left main and  of RCA 0n 17,  ICM (LVEF 10%), PAF, nicotine addiction, Hep C (undetectable viral load), h/o GIB w/ colonic polyp and AVM, who presents today for follow up. He reports overall he is doing better. He is doing well on the milrinone. He is compliant with wearing his LifeVest.  He denies any shocks. He is not smoking and doing well on the nicotine patch, he feels we can decrease the dosage. He denies drug or alcohol use. He is compliant with all his medications. He recently was seen by Dr. Rut Ignacio and told everything was ok. He is performing daily weights and maintaining between 157-158 lbs.   He is watching his sodium and fluid intake. He is sleeping on one pillow and denies orthopnea. He no longer is sleeping in the recliner and is able to lay flat without SOB. When he walks a long way, his calves ache from exertion - he said this is different than his neuropathy pain. He reports the Cymbalta and increased Neurontin has helped with his neuropathy. Pt c/o lightheadedness, non productive cough, and claudication. Pt denies fatigue, fevers, chills, dizziness, syncope, headaches, visual changes, N/V, changes to his appetite, changes to his weight, early satiety, chest pain, palpitations, SOB, constipation/diarrhea, edema, orthopena, melena, or BRBPR, depression or anxiety. Objective:     Visit Vitals    /62 (BP 1 Location: Right arm, BP Patient Position: Sitting)    Pulse 74    Temp 97.6 °F (36.4 °C) (Oral)    Ht 5' 10\" (1.778 m)    Wt 163 lb 6.4 oz (74.1 kg)    SpO2 99%    BMI 23.45 kg/m2          Gen:   WA, WN, NAD  HEENT:  EOMI, no obvious oral lesions  Neck:  supple, (-) adenopathy (-) JVD  CVS:     Distant heart sounds, S1/S2, S3 gallop  Pul:  CTA b/l (-) r,r,w  Abd:  +BS, soft, NT  Ext:  (-) edema  Neuro:   No obvious deficits           Past History:     Past Medical History:   Diagnosis Date    Cardiomyopathy ([address] 1/20/2017    A. Echo (1/19/17):  EF 5-10% with severe GHK,. Mildly dil LA. Mild TR. PASP 46.    Chronic renal insufficiency 3/6/2017    Diabetes mellitus ([address] 3/6/2017    Dyslipidemia 3/6/2017    A. FLP (1/19/17): Tot 120, , HDL 19, LDL 76 (no Rx).  H/O: GI bleed 3/6/2017    Hepatitis C 3/6/2017    Paroxysmal atrial fibrillation ([address] 3/6/2017       Past Surgical History:   Procedure Laterality Date    COLONOSCOPY N/A 2/17/2017    COLONOSCOPY performed by Stephanie Villegas.  Samira Philip MD at [facility]       Social History   Substance Use Topics    Smoking status: Former Smoker     Packs/day: 2.00     Years: 45.00     Quit date: 1/20/2017    Smokeless tobacco: Not on file    Alcohol use No        No family history on file. Allergies: Allergies   Allergen Reactions    Heparin (Porcine) Unknown (comments)        Data Review:       CMP:   Lab Results   Component Value Date/Time    Glucose 204 02/27/2017 01:30 PM    Sodium 134 02/27/2017 01:30 PM    Potassium 4.6 02/27/2017 01:30 PM    Chloride 93 02/27/2017 01:30 PM    CO2 22 02/27/2017 01:30 PM    BUN 31 02/27/2017 01:30 PM    Creatinine 1.64 02/27/2017 01:30 PM    Calcium 9.2 02/27/2017 01:30 PM    Anion gap 9 02/22/2017 03:16 AM    BUN/Creatinine ratio 19 02/27/2017 01:30 PM    Bilirubin, total 0.5 02/22/2017 03:16 AM    ALT (SGPT) 21 02/22/2017 03:16 AM    AST (SGOT) 14 02/22/2017 03:16 AM    Alk. phosphatase 99 02/22/2017 03:16 AM    Protein, total 7.3 02/22/2017 03:16 AM     02/14/2017 03:03 AM    Albumin 2.9 02/22/2017 03:16 AM    Globulin 4.4 02/22/2017 03:16 AM    A-G Ratio 0.7 02/22/2017 03:16 AM   , PHOS:   Lab Results   Component Value Date/Time    Phosphorus 2.5 01/23/2017 12:09 PM       Medications reviewed:    Current Outpatient Prescriptions   Medication Sig    sacubitril-valsartan (ENTRESTO) 24 mg/26 mg tablet Take 1 Tab by mouth two (2) times a day.  bumetanide (BUMEX) 2 mg tablet Take 0.5 Tabs by mouth daily.  nicotine (NICODERM CQ) 7 mg/24 hr 1 Patch by TransDERmal route every twenty-four (24) hours for 30 days.  gabapentin (NEURONTIN) 300 mg capsule Take 900 mg by mouth three (3) times daily.  DULoxetine (CYMBALTA) 30 mg capsule Take 1 Cap by mouth daily.  sacubitril-valsartan (ENTRESTO) 24 mg/26 mg tablet Take 1 Tab by mouth two (2) times a day.  amiodarone (CORDARONE) 200 mg tablet Take 1 Tab by mouth every twelve (12) hours.  aspirin delayed-release 81 mg tablet Take 1 Tab by mouth daily.  atorvastatin (LIPITOR) 10 mg tablet Take 1 Tab by mouth daily.  carvedilol (COREG) 3.125 mg tablet Take 1 Tab by mouth two (2) times daily (with meals).  clopidogrel (PLAVIX) 75 mg tab Take 1 Tab by mouth daily.  magnesium oxide (MAG-OX) 400 mg tablet Take 1 Tab by mouth daily.  nicotine (NICODERM CQ) 14 mg/24 hr patch 1 Patch by TransDERmal route daily for 30 days.  spironolactone (ALDACTONE) 25 mg tablet Take 1 Tab by mouth daily.  milrinone (PRIMACOR) 20 mg/100 mL (200 mcg/mL) infusion 28.425 mcg/min by IntraVENous route continuous.  insulin NPH/insulin regular (NOVOLIN 70/30, HUMULIN 70/30) 100 unit/mL (70-30) injection 10 units w/ breakfast  8 units w/ dinner    Insulin Syringes, Disposable, 1 mL syrg Pt to take 10 units w/ breakfast and 8 units w/ dinner     No current facility-administered medications for this visit.         Pt seen under the direct supervision of Dr. Corina Gregory, PAMargaretC  Jennifer Roman [phone]

## 2017-03-16 ENCOUNTER — HOME CARE VISIT (OUTPATIENT)
Dept: SCHEDULING | Facility: HOME HEALTH | Age: 67
End: 2017-03-16

## 2017-03-16 PROCEDURE — G0299 HHS/HOSPICE OF RN EA 15 MIN: HCPCS

## 2017-03-18 VITALS
DIASTOLIC BLOOD PRESSURE: 62 MMHG | WEIGHT: 158 LBS | SYSTOLIC BLOOD PRESSURE: 103 MMHG | OXYGEN SATURATION: 98 % | TEMPERATURE: 98.7 F | RESPIRATION RATE: 18 BRPM | BODY MASS INDEX: 22.67 KG/M2 | HEART RATE: 78 BPM

## 2017-03-20 ENCOUNTER — HOSPITAL ENCOUNTER (OUTPATIENT)
Dept: VASCULAR SURGERY | Age: 67
Discharge: HOME | End: 2017-03-20
Attending: PHYSICIAN ASSISTANT
Payer: SELF-PAY

## 2017-03-20 DIAGNOSIS — I73.9 CLAUDICATION (HCC): ICD-10-CM

## 2017-03-20 PROCEDURE — 93923 UPR/LXTR ART STDY 3+ LVLS: CPT

## 2017-03-20 NOTE — PROCEDURES
[address]  *** FINAL REPORT ***    Name: Harlan Mcdonald  MRN: [id number]    Outpatient  : 1950  HIS Order #: [id number]  [id number] [employer] Visit #: [id number]  Date: 20 Mar 2017    TYPE OF TEST: Peripheral Arterial Testing    REASON FOR TEST  Claudication (both sides)    Right Leg  Segmentals: Abnormal                     mmHg  Brachial         107  High thigh  Low thigh         70  Calf              55  Posterior tibial  51  Dorsalis pedis    62  Peroneal  Metatarsal  Toe pressure  Doppler:  PVR:        Abnormal  Ankle/Brachial: 0.58    Left Leg  Segmentals: Abnormal                     mmHg  Brachial  High thigh  Low thigh         88  Calf              38  Posterior tibial  51  Dorsalis pedis    60  Peroneal  Metatarsal  Toe pressure  Doppler:  PVR:        Abnormal  Ankle/Brachial: 0.56    INTERPRETATION/FINDINGS  PROCEDURE:  Evaluation of lower extremity arteries with multilevel  systolic blood pressure measurements and pulse volume recording (PVR)  plethysmography. Includes calculation of the ankle/brachial pressure  indices (RICKY's). IMPRESSION:  1. Moderate peripheral arterial disease indicated at rest in the right   leg. 2. Moderate peripheral arterial disease indicated at rest in the left  leg. 3. The right ankle/brachial index is 0.58 and the left ankle/brachial  index is 0.56.  4. The right toe/brachial index is 0.34 and the left toe/brachial  index is 0.24. ADDITIONAL COMMENTS    I have personally reviewed the data relevant to the interpretation of  this  study. TECHNOLOGIST: Maxx Hammond RDCS  Signed: 2017 10:46 AM    PHYSICIAN: Madelyn Cheney.  Rick Han MD  Signed: 2017 03:16 PM

## 2017-03-23 ENCOUNTER — TELEPHONE (OUTPATIENT)
Dept: CARDIOLOGY CLINIC | Age: 67
End: 2017-03-23

## 2017-03-23 ENCOUNTER — HOME CARE VISIT (OUTPATIENT)
Dept: SCHEDULING | Facility: HOME HEALTH | Age: 67
End: 2017-03-23

## 2017-03-23 VITALS
DIASTOLIC BLOOD PRESSURE: 62 MMHG | HEART RATE: 75 BPM | TEMPERATURE: 99 F | RESPIRATION RATE: 18 BRPM | BODY MASS INDEX: 22.38 KG/M2 | WEIGHT: 156 LBS | OXYGEN SATURATION: 99 % | SYSTOLIC BLOOD PRESSURE: 108 MMHG

## 2017-03-23 PROCEDURE — G0299 HHS/HOSPICE OF RN EA 15 MIN: HCPCS

## 2017-03-23 NOTE — TELEPHONE ENCOUNTER
Please call nurse back with lab orders for patient. The next time I see him scheduled his April 10th.     Pedro Luis Metcalf can be reached at #[phone]

## 2017-03-30 ENCOUNTER — HOME CARE VISIT (OUTPATIENT)
Dept: SCHEDULING | Facility: HOME HEALTH | Age: 67
End: 2017-03-30

## 2017-03-30 VITALS
DIASTOLIC BLOOD PRESSURE: 51 MMHG | HEART RATE: 73 BPM | WEIGHT: 156.8 LBS | BODY MASS INDEX: 22.5 KG/M2 | RESPIRATION RATE: 18 BRPM | TEMPERATURE: 98.6 F | SYSTOLIC BLOOD PRESSURE: 101 MMHG | OXYGEN SATURATION: 98 %

## 2017-03-30 PROCEDURE — G0299 HHS/HOSPICE OF RN EA 15 MIN: HCPCS

## 2017-04-05 ENCOUNTER — TELEPHONE (OUTPATIENT)
Dept: CARDIAC REHAB | Age: 67
End: 2017-04-05

## 2017-04-05 DIAGNOSIS — B18.2 CHRONIC HEPATITIS C WITHOUT HEPATIC COMA (HCC): ICD-10-CM

## 2017-04-05 DIAGNOSIS — E78.5 DYSLIPIDEMIA: ICD-10-CM

## 2017-04-05 DIAGNOSIS — I25.5 ISCHEMIC CARDIOMYOPATHY: ICD-10-CM

## 2017-04-05 DIAGNOSIS — I48.0 PAROXYSMAL ATRIAL FIBRILLATION (HCC): Primary | ICD-10-CM

## 2017-04-05 NOTE — TELEPHONE ENCOUNTER
4/5/2017 Cardiac Wellness: Called Mr. Daphne Aguilar.  to discuss participation in the Cardiac Wellness Program following shf/stents on 1/20/2017 . Spoke with pt. He has not heard back from [employer] and/or SS disability. Agreed to follow up with Adv.  HF  Rowan Salazar  To check status and we will call on 4/13/2017 Jessica Ceja RN

## 2017-04-07 ENCOUNTER — HOME CARE VISIT (OUTPATIENT)
Dept: SCHEDULING | Facility: HOME HEALTH | Age: 67
End: 2017-04-07

## 2017-04-07 VITALS
HEART RATE: 81 BPM | DIASTOLIC BLOOD PRESSURE: 68 MMHG | SYSTOLIC BLOOD PRESSURE: 108 MMHG | OXYGEN SATURATION: 98 % | WEIGHT: 157 LBS | TEMPERATURE: 98.5 F | BODY MASS INDEX: 22.53 KG/M2 | RESPIRATION RATE: 18 BRPM

## 2017-04-07 PROCEDURE — G0299 HHS/HOSPICE OF RN EA 15 MIN: HCPCS

## 2017-04-11 ENCOUNTER — OFFICE VISIT (OUTPATIENT)
Dept: CARDIOLOGY CLINIC | Age: 67
End: 2017-04-11

## 2017-04-11 VITALS
DIASTOLIC BLOOD PRESSURE: 60 MMHG | OXYGEN SATURATION: 98 % | HEIGHT: 70 IN | HEART RATE: 76 BPM | SYSTOLIC BLOOD PRESSURE: 94 MMHG | WEIGHT: 168.4 LBS | BODY MASS INDEX: 24.11 KG/M2 | RESPIRATION RATE: 20 BRPM | TEMPERATURE: 97.8 F

## 2017-04-11 DIAGNOSIS — I25.5 ISCHEMIC CARDIOMYOPATHY: Primary | ICD-10-CM

## 2017-04-11 DIAGNOSIS — E11.40 CONTROLLED TYPE 2 DIABETES MELLITUS WITH DIABETIC NEUROPATHY, WITHOUT LONG-TERM CURRENT USE OF INSULIN (HCC): ICD-10-CM

## 2017-04-11 DIAGNOSIS — I48.0 PAROXYSMAL ATRIAL FIBRILLATION (HCC): ICD-10-CM

## 2017-04-11 DIAGNOSIS — E78.5 DYSLIPIDEMIA: ICD-10-CM

## 2017-04-11 DIAGNOSIS — K92.2 LOWER GI BLEEDING: ICD-10-CM

## 2017-04-11 DIAGNOSIS — I73.9 CLAUDICATION (HCC): ICD-10-CM

## 2017-04-11 DIAGNOSIS — R76.8 HCV ANTIBODY POSITIVE: ICD-10-CM

## 2017-04-11 NOTE — PATIENT INSTRUCTIONS
Please continue your current medications. Continue to use your Life Vest.    We will schedule an echocardiogram for you and notify you of the date and time. Home Health will draw your labs on Thursday. Please follow up with us in two weeks.

## 2017-04-11 NOTE — MR AVS SNAPSHOT
Visit Information Date & Time Provider Department Dept. Phone Encounter #  
 4/11/2017  9:30 AM Chao Fuller MD [address] [id number] Your Appointments 8/15/2017  3:00 PM  
ESTABLISHED PATIENT with Johny High MD  
[facility] ([facility] SCHEDULING) Appt Note: 5 mo fup  
 [address] [facility] [address]  
[address] [place] [address] [id number] [place] [address] Upcoming Health Maintenance Date Due  
 FOOT EXAM Q1 11/23/1962 MICROALBUMIN Q1 11/23/1962 EYE EXAM RETINAL OR DILATED Q1 11/23/1962 Pneumococcal 19-64 Medium Risk (1 of 1 - PPSV23) 11/23/1971 DTaP/Tdap/Td series (1 - Tdap) 11/23/1973 ZOSTER VACCINE AGE 60> 11/23/2012 INFLUENZA AGE 9 TO ADULT 8/1/2016 HEMOGLOBIN A1C Q6M 7/19/2017 LIPID PANEL Q1 1/19/2018 FOBT Q 1 YEAR AGE 50-75 2/16/2018 Allergies as of 4/11/2017  Review Complete On: 4/11/2017 By: Rachel Rod LPN Severity Noted Reaction Type Reactions Heparin (Porcine)  02/07/2017    Unknown (comments) Current Immunizations  Reviewed on 2/14/2017 No immunizations on file. Not reviewed this visit You Were Diagnosed With   
  
 Codes Comments Ischemic cardiomyopathy    -  Primary ICD-10-CM: I25.5 ICD-9-CM: 414.8 Vitals BP Pulse Temp Resp Height(growth percentile) Weight(growth percentile) 94/60 (BP 1 Location: Right arm, BP Patient Position: Sitting) 76 97.8 °F (36.6 °C) (Oral) 20 5' 10\" (1.778 m) 168 lb 6.4 oz (76.4 kg) SpO2 BMI Smoking Status 98% 24.16 kg/m2 Former Smoker Vitals History BMI and BSA Data Body Mass Index Body Surface Area  
 24.16 kg/m 2 1.94 m 2 Preferred Pharmacy Pharmacy Name Phone [facility] [id number], [address] [phone] Your Updated Medication List  
  
   
 This list is accurate as of: 4/11/17 10:39 AM.  Always use your most recent med list.  
  
  
  
  
 amiodarone 200 mg tablet Commonly known as:  CORDARONE Take 1 Tab by mouth every twelve (12) hours. aspirin delayed-release 81 mg tablet Take 1 Tab by mouth daily. atorvastatin 10 mg tablet Commonly known as:  LIPITOR Take 1 Tab by mouth daily. bumetanide 2 mg tablet Commonly known as:  Tennis Pancake Take 0.5 Tabs by mouth daily. carvedilol 3.125 mg tablet Commonly known as:  [place] Take 1 Tab by mouth two (2) times daily (with meals). clopidogrel 75 mg Tab Commonly known as:  PLAVIX Take 1 Tab by mouth daily. DULoxetine 30 mg capsule Commonly known as:  CYMBALTA Take 1 Cap by mouth daily. gabapentin 300 mg capsule Commonly known as:  NEURONTIN Take 900 mg by mouth three (3) times daily. insulin NPH/insulin regular 100 unit/mL (70-30) injection Commonly known as:  NOVOLIN 70/30, HUMULIN 70/30  
10 units w/ breakfast 8 units w/ dinner Insulin Syringes (Disposable) 1 mL Syrg Pt to take 10 units w/ breakfast and 8 units w/ dinner  
  
 magnesium oxide 400 mg tablet Commonly known as:  MAG-OX Take 1 Tab by mouth daily. milrinone 20 mg/100 mL (200 mcg/mL) infusion Commonly known as:  PRIMACOR  
28.425 mcg/min by IntraVENous route continuous. nicotine 7 mg/24 hr  
Commonly known as:  NICODERM CQ  
1 Patch by TransDERmal route every twenty-four (24) hours for 30 days. * sacubitril-valsartan 24-26 mg tablet Commonly known as:  ENTRESTO Take 1 Tab by mouth two (2) times a day. * sacubitril-valsartan 24-26 mg tablet Commonly known as:  ENTRESTO Take 1 Tab by mouth two (2) times a day. spironolactone 25 mg tablet Commonly known as:  ALDACTONE Take 1 Tab by mouth daily. * Notice:   This list has 2 medication(s) that are the same as other medications prescribed for you. Read the directions carefully, and ask your doctor or other care provider to review them with you. To-Do List   
 04/13/2017 To Be Determined Appointment with Emiliano Newman LPN at [facility]  
  
 04/20/2017 To Be Determined Appointment with Quang Mejía RN at [place] [address]  
  
 04/24/2017 ECHO:  2D ECHO COMPLETE ADULT (TTE) W OR WO CONTR Patient Instructions Please continue your current medications. Continue to use your Life Vest. 
 
We will schedule an echocardiogram for you and notify you of the date and time. [facility] will draw your labs on Thursday. Please follow up with us in two weeks. Introducing [facility]! Dea Cao introduces [employer] patient portal. Now you can access parts of your medical record, email your doctor's office, and request medication refills online. 1. In your internet browser, go to [email or web address] 2. Click on the First Time User? Click Here link in the Sign In box. You will see the New Member Sign Up page. 3. Enter your [employer] Access Code exactly as it appears below. You will not need to use this code after youve completed the sign-up process. If you do not sign up before the expiration date, you must request a new code. · [employer] Access Code: [email or web address] Expires: 4/18/2017 11:53 PM 
 
4. Enter the last four digits of your Social Security Number (xxxx) and Date of Birth (mm/dd/yyyy) as indicated and click Submit. You will be taken to the next sign-up page. 5. Create a [employer] ID. This will be your [employer] login ID and cannot be changed, so think of one that is secure and easy to remember. 6. Create a [employer] password. You can change your password at any time. 7. Enter your Password Reset Question and Answer. This can be used at a later time if you forget your password. 8. Enter your e-mail address. You will receive e-mail notification when new information is available in [address] 9. Click Sign Up. You can now view and download portions of your medical record. 10. Click the Download Summary menu link to download a portable copy of your medical information. If you have questions, please visit the Frequently Asked Questions section of the [employer] website. Remember, [employer] is NOT to be used for urgent needs. For medical emergencies, dial 911. Now available from your iPhone and Android! Please provide this summary of care documentation to your next provider. Your primary care clinician is listed as MICHAEL BALLARD. If you have any questions after today's visit, please call [phone].

## 2017-04-11 NOTE — PROGRESS NOTES
[facility] Clinic Note      NAME:  Fernanda Fleming. :   1950   MRN:   [id number]   PCP:  Kiesha Chapin MD    Date:  2017     IMPRESSION/PLAN:    ICM, EF 10%, home inotrope   Continue milrinone, Entresto, Coreg, and Spironolactone   BP marginal today - repeat in 2 weeks and hopefully increase Entresto at that time  Decrease Bumex to 1 mg qOD  TTE after 90 days of OMM to eval for ICD  Follow up in 2 weeks    CAD s/p PCI  Continue ASA, Plavix, Coreg and statin          Claudication  ABIs c/w moderate PAD bilaterally     CKD  Followed by renal  BMP pending    PAF  Remains in NSR  Continue amiodarone, Coreg    H/O lower GIB  Will need colonic polyp and AVM treated at some point - denies melena    H/O Tobacco Abuse  Pt doing well - has stopped using nicotine patch     Diabetes  Insulin management by PCP    Diabetic neuropathy  Continue neurontin 300 mg TID and Cymbalta 30 mg daily        Hepatitis C  Undetectable viral load, will repeat HCV RNA with next lab draw per hepatologist recommendations      Subjective:     Mr. Johanne Gibson is a 62 yo, C male w/ PMH CAD s/p PCI to left main and  of RCA 0n 17,  ICM (LVEF 10%), PAF, nicotine addiction, Hep C (undetectable viral load), h/o GIB w/ colonic polyp and AVM, who presents today for follow up. He reports overall he is doing better every day. He is doing well on the milrinone and compliant with wearing his LifeVest.  He denies any shocks. He is not smoking and has stopped using the nicotine patch. He denies drug or alcohol use. He is compliant with all his medications. He is staying busy, working around his house, doing yard work, taking out [employer]. He weighs himself daily and has noticed a gradual increase in his weight, but attributes this to a \"great appetite\" and increased PO intake . He reports claudication, occasional NAVARRO, AM dizziness, cough, trace lower extremity edema.   He denies CP, palpitations, orthopnea, syncope, PND. He continues to watch his Na+ intake and fluid status. ROS:   Reports: claudication, NAVARRO, dizziness, cough, trace lower extremity edema  Denies: CP, palpitations, orthopnea, syncope, PND, nausea, vomiting, early satiety, bowel or bladder changes, fevers, chills, malaise. Objective:     Visit Vitals    BP 94/60 (BP 1 Location: Right arm, BP Patient Position: Sitting)    Pulse 76    Temp 97.8 °F (36.6 °C) (Oral)    Resp 20    Ht 5' 10\" (1.778 m)    Wt 168 lb 6.4 oz (76.4 kg)    SpO2 98%    BMI 24.16 kg/m2          Gen:   WA, WN, NAD  HEENT:  EOMI, no obvious oral lesions  Neck:  supple, (-) adenopathy (-) JVD  CVS:     Distant heart sounds, S1/S2  Pul:  CTA b/l (-) r,r,w  Abd:  +BS, soft, NT  Ext:  (-) edema  Neuro:   No obvious deficits           Past History:     Past Medical History:   Diagnosis Date    Cardiomyopathy ([address] 1/20/2017    A. Echo (1/19/17):  EF 5-10% with severe GHK,. Mildly dil LA. Mild TR. PASP 46.    Chronic renal insufficiency 3/6/2017    Diabetes mellitus ([address] 3/6/2017    Dyslipidemia 3/6/2017    A. FLP (1/19/17): Tot 120, , HDL 19, LDL 76 (no Rx).  H/O: GI bleed 3/6/2017    Hepatitis C 3/6/2017    Paroxysmal atrial fibrillation ([address] 3/6/2017     No family history on file. Past Surgical History:   Procedure Laterality Date    COLONOSCOPY N/A 2/17/2017    COLONOSCOPY performed by Carrol Felix. Yoan Luther MD at [facility] ENDOSCOPY       Social History   Substance Use Topics    Smoking status: Former Smoker     Packs/day: 2.00     Years: 45.00     Quit date: 1/20/2017    Smokeless tobacco: Not on file    Alcohol use No        No family history on file. Allergies:      Allergies   Allergen Reactions    Heparin (Porcine) Unknown (comments)        Data Review:       CMP:   Lab Results   Component Value Date/Time    Glucose 204 02/27/2017 01:30 PM    Sodium 134 02/27/2017 01:30 PM    Potassium 4.6 02/27/2017 01:30 PM    Chloride 93 02/27/2017 01:30 PM    CO2 22 02/27/2017 01:30 PM    BUN 31 02/27/2017 01:30 PM    Creatinine 1.64 02/27/2017 01:30 PM    Calcium 9.2 02/27/2017 01:30 PM    Anion gap 9 02/22/2017 03:16 AM    BUN/Creatinine ratio 19 02/27/2017 01:30 PM    Bilirubin, total 0.5 02/22/2017 03:16 AM    ALT (SGPT) 21 02/22/2017 03:16 AM    AST (SGOT) 14 02/22/2017 03:16 AM    Alk. phosphatase 99 02/22/2017 03:16 AM    Protein, total 7.3 02/22/2017 03:16 AM     02/14/2017 03:03 AM    Albumin 2.9 02/22/2017 03:16 AM    Globulin 4.4 02/22/2017 03:16 AM    A-G Ratio 0.7 02/22/2017 03:16 AM   , PHOS:   Lab Results   Component Value Date/Time    Phosphorus 2.5 01/23/2017 12:09 PM       Medications reviewed:    Current Outpatient Prescriptions   Medication Sig    bumetanide (BUMEX) 2 mg tablet Take 0.5 Tabs by mouth daily.  gabapentin (NEURONTIN) 300 mg capsule Take 900 mg by mouth three (3) times daily.  DULoxetine (CYMBALTA) 30 mg capsule Take 1 Cap by mouth daily.  sacubitril-valsartan (ENTRESTO) 24 mg/26 mg tablet Take 1 Tab by mouth two (2) times a day.  amiodarone (CORDARONE) 200 mg tablet Take 1 Tab by mouth every twelve (12) hours.  aspirin delayed-release 81 mg tablet Take 1 Tab by mouth daily.  atorvastatin (LIPITOR) 10 mg tablet Take 1 Tab by mouth daily.  carvedilol (COREG) 3.125 mg tablet Take 1 Tab by mouth two (2) times daily (with meals).  clopidogrel (PLAVIX) 75 mg tab Take 1 Tab by mouth daily.  magnesium oxide (MAG-OX) 400 mg tablet Take 1 Tab by mouth daily.  spironolactone (ALDACTONE) 25 mg tablet Take 1 Tab by mouth daily.  milrinone (PRIMACOR) 20 mg/100 mL (200 mcg/mL) infusion 28.425 mcg/min by IntraVENous route continuous.     insulin NPH/insulin regular (NOVOLIN 70/30, HUMULIN 70/30) 100 unit/mL (70-30) injection 10 units w/ breakfast  8 units w/ dinner    Insulin Syringes, Disposable, 1 mL syrg Pt to take 10 units w/ breakfast and 8 units w/ dinner    nicotine (NICODERM CQ) 7 mg/24 hr 1 Patch by TransDERmal route every twenty-four (24) hours for 30 days. No current facility-administered medications for this visit. Follow-up Disposition:  Return in about 2 weeks (around 4/25/2017).     Thank you for letting us see Mr Sharath Caruso with you,    Jabier Coleman, [facility]  Jennifer Barron Roman [phone]

## 2017-04-11 NOTE — COMMUNICATION BODY
[facility] Clinic Note      NAME:  Fernanda Graham. :   1950   MRN:   [id number]   PCP:  Fabricio Morel MD    Date:  2017     IMPRESSION/PLAN:    ICM, EF 10%, home inotrope   Continue milrinone, Entresto, Coreg, and Spironolactone   BP marginal today - repeat in 2 weeks and hopefully increase Entresto at that time  Decrease Bumex to 1 mg qOD  TTE after 90 days of OMM to eval for ICD  Follow up in 2 weeks    CAD s/p PCI  Continue ASA, Plavix, Coreg and statin          Claudication  ABIs c/w moderate PAD bilaterally     CKD  Followed by renal  BMP pending    PAF  Remains in NSR  Continue amiodarone, Coreg    H/O lower GIB  Will need colonic polyp and AVM treated at some point - denies melena    H/O Tobacco Abuse  Pt doing well - has stopped using nicotine patch     Diabetes  Insulin management by PCP    Diabetic neuropathy  Continue neurontin 300 mg TID and Cymbalta 30 mg daily        Hepatitis C  Undetectable viral load, will repeat HCV RNA with next lab draw per hepatologist recommendations      Subjective:     Mr. Nathen Vinson is a 60 yo, C male w/ PMH CAD s/p PCI to left main and  of RCA 0n 17,  ICM (LVEF 10%), PAF, nicotine addiction, Hep C (undetectable viral load), h/o GIB w/ colonic polyp and AVM, who presents today for follow up. He reports overall he is doing better every day. He is doing well on the milrinone and compliant with wearing his LifeVest.  He denies any shocks. He is not smoking and has stopped using the nicotine patch. He denies drug or alcohol use. He is compliant with all his medications. He is staying busy, working around his house, doing yard work, taking out [employer]. He weighs himself daily and has noticed a gradual increase in his weight, but attributes this to a \"great appetite\" and increased PO intake . He reports claudication, occasional NAVARRO, AM dizziness, cough, trace lower extremity edema.   He denies CP, palpitations, orthopnea, syncope, PND. He continues to watch his Na+ intake and fluid status. ROS:   Reports: claudication, NAVARRO, dizziness, cough, trace lower extremity edema  Denies: CP, palpitations, orthopnea, syncope, PND, nausea, vomiting, early satiety, bowel or bladder changes, fevers, chills, malaise. Objective:     Visit Vitals    BP 94/60 (BP 1 Location: Right arm, BP Patient Position: Sitting)    Pulse 76    Temp 97.8 °F (36.6 °C) (Oral)    Resp 20    Ht 5' 10\" (1.778 m)    Wt 168 lb 6.4 oz (76.4 kg)    SpO2 98%    BMI 24.16 kg/m2          Gen:   WA, WN, NAD  HEENT:  EOMI, no obvious oral lesions  Neck:  supple, (-) adenopathy (-) JVD  CVS:     Distant heart sounds, S1/S2  Pul:  CTA b/l (-) r,r,w  Abd:  +BS, soft, NT  Ext:  (-) edema  Neuro:   No obvious deficits           Past History:     Past Medical History:   Diagnosis Date    Cardiomyopathy ([address] 1/20/2017    A. Echo (1/19/17):  EF 5-10% with severe GHK,. Mildly dil LA. Mild TR. PASP 46.    Chronic renal insufficiency 3/6/2017    Diabetes mellitus ([address] 3/6/2017    Dyslipidemia 3/6/2017    A. FLP (1/19/17): Tot 120, , HDL 19, LDL 76 (no Rx).  H/O: GI bleed 3/6/2017    Hepatitis C 3/6/2017    Paroxysmal atrial fibrillation ([address] 3/6/2017     No family history on file. Past Surgical History:   Procedure Laterality Date    COLONOSCOPY N/A 2/17/2017    COLONOSCOPY performed by Carrol Felix. Yoan Luther MD at [facility] ENDOSCOPY       Social History   Substance Use Topics    Smoking status: Former Smoker     Packs/day: 2.00     Years: 45.00     Quit date: 1/20/2017    Smokeless tobacco: Not on file    Alcohol use No        No family history on file. Allergies:      Allergies   Allergen Reactions    Heparin (Porcine) Unknown (comments)        Data Review:       CMP:   Lab Results   Component Value Date/Time    Glucose 204 02/27/2017 01:30 PM    Sodium 134 02/27/2017 01:30 PM    Potassium 4.6 02/27/2017 01:30 PM    Chloride 93 02/27/2017 01:30 PM    CO2 22 02/27/2017 01:30 PM    BUN 31 02/27/2017 01:30 PM    Creatinine 1.64 02/27/2017 01:30 PM    Calcium 9.2 02/27/2017 01:30 PM    Anion gap 9 02/22/2017 03:16 AM    BUN/Creatinine ratio 19 02/27/2017 01:30 PM    Bilirubin, total 0.5 02/22/2017 03:16 AM    ALT (SGPT) 21 02/22/2017 03:16 AM    AST (SGOT) 14 02/22/2017 03:16 AM    Alk. phosphatase 99 02/22/2017 03:16 AM    Protein, total 7.3 02/22/2017 03:16 AM     02/14/2017 03:03 AM    Albumin 2.9 02/22/2017 03:16 AM    Globulin 4.4 02/22/2017 03:16 AM    A-G Ratio 0.7 02/22/2017 03:16 AM   , PHOS:   Lab Results   Component Value Date/Time    Phosphorus 2.5 01/23/2017 12:09 PM       Medications reviewed:    Current Outpatient Prescriptions   Medication Sig    bumetanide (BUMEX) 2 mg tablet Take 0.5 Tabs by mouth daily.  gabapentin (NEURONTIN) 300 mg capsule Take 900 mg by mouth three (3) times daily.  DULoxetine (CYMBALTA) 30 mg capsule Take 1 Cap by mouth daily.  sacubitril-valsartan (ENTRESTO) 24 mg/26 mg tablet Take 1 Tab by mouth two (2) times a day.  amiodarone (CORDARONE) 200 mg tablet Take 1 Tab by mouth every twelve (12) hours.  aspirin delayed-release 81 mg tablet Take 1 Tab by mouth daily.  atorvastatin (LIPITOR) 10 mg tablet Take 1 Tab by mouth daily.  carvedilol (COREG) 3.125 mg tablet Take 1 Tab by mouth two (2) times daily (with meals).  clopidogrel (PLAVIX) 75 mg tab Take 1 Tab by mouth daily.  magnesium oxide (MAG-OX) 400 mg tablet Take 1 Tab by mouth daily.  spironolactone (ALDACTONE) 25 mg tablet Take 1 Tab by mouth daily.  milrinone (PRIMACOR) 20 mg/100 mL (200 mcg/mL) infusion 28.425 mcg/min by IntraVENous route continuous.     insulin NPH/insulin regular (NOVOLIN 70/30, HUMULIN 70/30) 100 unit/mL (70-30) injection 10 units w/ breakfast  8 units w/ dinner    Insulin Syringes, Disposable, 1 mL syrg Pt to take 10 units w/ breakfast and 8 units w/ dinner    nicotine (NICODERM CQ) 7 mg/24 hr 1 Patch by TransDERmal route every twenty-four (24) hours for 30 days. No current facility-administered medications for this visit. Follow-up Disposition:  Return in about 2 weeks (around 4/25/2017).     Thank you for letting us see Mr Luke Fallon with you,    Niki Darby, AGAP-BC  Jennifer Roman [phone]

## 2017-04-12 ENCOUNTER — DOCUMENTATION ONLY (OUTPATIENT)
Dept: CARDIOLOGY CLINIC | Age: 67
End: 2017-04-12

## 2017-04-12 ENCOUNTER — TELEPHONE (OUTPATIENT)
Dept: CARDIOLOGY CLINIC | Age: 67
End: 2017-04-12

## 2017-04-12 ENCOUNTER — HOME CARE VISIT (OUTPATIENT)
Dept: SCHEDULING | Facility: HOME HEALTH | Age: 67
End: 2017-04-12

## 2017-04-12 PROCEDURE — G0300 HHS/HOSPICE OF LPN EA 15 MIN: HCPCS

## 2017-04-12 NOTE — PROGRESS NOTES
Spoke with the patient a few weeks ago about his Medicaid denial. Shared that per the [employer] worker he is being denied for being over the resource threshold. According to the [employer] worker the patient and his wife have over $20,000 in a money market account. The patient reports this is an inheritance given to his wife by her mother when she passed away.  shared that for a couple the income threshold is $3,000 and they will need to use resources towards medical bills to show the need for Medicaid. The patient reports he cannot do that to his wife and he will just have to figure out another way to pay his medical bills. He has applied for the care card and  helped submit his application for cymbalta PAP. Shared he will need to enroll in Medicare once he turns 74 and pay for a secondary insurance before he can be considered for a VAD. Will continue to follow to provide support.     Bossman Gonzales, MSW, LCSW    Clinical    Jennifer Roman [phone]

## 2017-04-13 ENCOUNTER — DOCUMENTATION ONLY (OUTPATIENT)
Dept: CARDIOLOGY CLINIC | Age: 67
End: 2017-04-13

## 2017-04-14 ENCOUNTER — TELEPHONE (OUTPATIENT)
Dept: CARDIOLOGY CLINIC | Age: 67
End: 2017-04-14

## 2017-04-14 NOTE — TELEPHONE ENCOUNTER
Called patient to review labs. Cr elevated (1.74) but stable at baseline. Will repeat in 2 weeks. Patient verbalizes understanding.

## 2017-04-16 VITALS
TEMPERATURE: 98 F | DIASTOLIC BLOOD PRESSURE: 58 MMHG | OXYGEN SATURATION: 97 % | BODY MASS INDEX: 23.45 KG/M2 | RESPIRATION RATE: 20 BRPM | SYSTOLIC BLOOD PRESSURE: 124 MMHG | HEART RATE: 85 BPM | WEIGHT: 163.4 LBS

## 2017-04-19 ENCOUNTER — HOME CARE VISIT (OUTPATIENT)
Dept: SCHEDULING | Facility: HOME HEALTH | Age: 67
End: 2017-04-19

## 2017-04-19 VITALS
WEIGHT: 160.4 LBS | HEART RATE: 77 BPM | OXYGEN SATURATION: 99 % | BODY MASS INDEX: 23.02 KG/M2 | TEMPERATURE: 98 F | RESPIRATION RATE: 18 BRPM | DIASTOLIC BLOOD PRESSURE: 64 MMHG | SYSTOLIC BLOOD PRESSURE: 118 MMHG

## 2017-04-19 PROCEDURE — G0299 HHS/HOSPICE OF RN EA 15 MIN: HCPCS

## 2017-04-19 RX ORDER — CLOPIDOGREL BISULFATE 75 MG/1
75 TABLET ORAL DAILY
Qty: 30 TAB | Refills: 1 | Status: SHIPPED | OUTPATIENT
Start: 2017-04-19 | End: 2017-06-25 | Stop reason: SDUPTHER

## 2017-04-19 RX ORDER — ATORVASTATIN CALCIUM 10 MG/1
10 TABLET, FILM COATED ORAL DAILY
Qty: 30 TAB | Refills: 1 | Status: SHIPPED | OUTPATIENT
Start: 2017-04-19 | End: 2017-06-25 | Stop reason: SDUPTHER

## 2017-04-19 RX ORDER — AMIODARONE HYDROCHLORIDE 200 MG/1
200 TABLET ORAL EVERY 12 HOURS
Qty: 60 TAB | Refills: 1 | Status: SHIPPED | OUTPATIENT
Start: 2017-04-19 | End: 2017-06-25 | Stop reason: SDUPTHER

## 2017-04-19 RX ORDER — CARVEDILOL 3.12 MG/1
3.12 TABLET ORAL 2 TIMES DAILY WITH MEALS
Qty: 60 TAB | Refills: 1 | Status: SHIPPED | OUTPATIENT
Start: 2017-04-19 | End: 2017-06-25 | Stop reason: SDUPTHER

## 2017-04-19 RX ORDER — SPIRONOLACTONE 25 MG/1
25 TABLET ORAL DAILY
Qty: 30 TAB | Refills: 1 | Status: SHIPPED | OUTPATIENT
Start: 2017-04-19 | End: 2017-06-25 | Stop reason: SDUPTHER

## 2017-04-25 ENCOUNTER — TELEPHONE (OUTPATIENT)
Dept: CARDIOLOGY CLINIC | Age: 67
End: 2017-04-25

## 2017-04-25 ENCOUNTER — OFFICE VISIT (OUTPATIENT)
Dept: CARDIOLOGY CLINIC | Age: 67
End: 2017-04-25

## 2017-04-25 VITALS
SYSTOLIC BLOOD PRESSURE: 100 MMHG | OXYGEN SATURATION: 97 % | HEART RATE: 82 BPM | DIASTOLIC BLOOD PRESSURE: 62 MMHG | WEIGHT: 170 LBS | TEMPERATURE: 97.8 F | BODY MASS INDEX: 24.39 KG/M2

## 2017-04-25 DIAGNOSIS — I25.5 ISCHEMIC CARDIOMYOPATHY: Primary | ICD-10-CM

## 2017-04-25 NOTE — TELEPHONE ENCOUNTER
Called  Kinga Douglasrobbin to make him aware I scheduled his echocardiogram at Dr. Virgilio Salas' office for Monday, May 15th at [address] [place] [address].  If this doesn't work for him he may reschedule. Made him aware Dr. Gautam Serrano wanted him to have his echo prior to his next visit.

## 2017-04-25 NOTE — MR AVS SNAPSHOT
Visit Information Date & Time Provider Department Dept. Phone Encounter #  
 4/25/2017 11:00 AM Nathalia Obrien MD [address] [id number] Your Appointments 8/15/2017  3:00 PM  
ESTABLISHED PATIENT with Danika Santizo MD  
[facility] ([facility] SCHEDULING) Appt Note: 5 mo fup  
 [address] [facility] [address] [facility]  
[address] [facility] [address] [address] Upcoming Health Maintenance Date Due  
 FOOT EXAM Q1 11/23/1962 MICROALBUMIN Q1 11/23/1962 EYE EXAM RETINAL OR DILATED Q1 11/23/1962 Pneumococcal 19-64 Medium Risk (1 of 1 - PPSV23) 11/23/1971 DTaP/Tdap/Td series (1 - Tdap) 11/23/1973 ZOSTER VACCINE AGE 60> 11/23/2012 INFLUENZA AGE 9 TO ADULT 8/1/2016 HEMOGLOBIN A1C Q6M 7/19/2017 LIPID PANEL Q1 1/19/2018 FOBT Q 1 YEAR AGE 50-75 2/16/2018 Allergies as of 4/25/2017  Review Complete On: 5/89/4494 By: Cindy Sandhoff, PA Severity Noted Reaction Type Reactions Heparin (Porcine)  02/07/2017    Unknown (comments) Current Immunizations  Reviewed on 2/14/2017 No immunizations on file. Not reviewed this visit Vitals BP Pulse Temp Weight(growth percentile) SpO2 BMI  
 100/62 82 97.8 °F (36.6 °C) 170 lb (77.1 kg) 97% 24.39 kg/m2 Smoking Status Former Smoker Vitals History BMI and BSA Data Body Mass Index Body Surface Area  
 24.39 kg/m 2 1.95 m 2 Preferred Pharmacy Pharmacy Name Phone [id number] [place], [address] [phone] Your Updated Medication List  
  
   
This list is accurate as of: 4/25/17 11:40 AM.  Always use your most recent med list.  
  
  
  
  
 amiodarone 200 mg tablet Commonly known as:  CORDARONE Take 1 Tab by mouth every twelve (12) hours. aspirin delayed-release 81 mg tablet Take 1 Tab by mouth daily. atorvastatin 10 mg tablet Commonly known as:  LIPITOR Take 1 Tab by mouth daily. bumetanide 2 mg tablet Commonly known as:  Ardyth Maffucci Take 0.5 Tabs by mouth daily. carvedilol 3.125 mg tablet Commonly known as:  Render Blonder Take 1 Tab by mouth two (2) times daily (with meals). clopidogrel 75 mg Tab Commonly known as:  PLAVIX Take 1 Tab by mouth daily. DULoxetine 30 mg capsule Commonly known as:  CYMBALTA Take 1 Cap by mouth daily. gabapentin 300 mg capsule Commonly known as:  NEURONTIN Take 900 mg by mouth three (3) times daily. insulin NPH/insulin regular 100 unit/mL (70-30) injection Commonly known as:  NOVOLIN 70/30, HUMULIN 70/30  
10 units w/ breakfast 8 units w/ dinner Insulin Syringes (Disposable) 1 mL Syrg Pt to take 10 units w/ breakfast and 8 units w/ dinner  
  
 magnesium oxide 400 mg tablet Commonly known as:  MAG-OX Take 1 Tab by mouth daily. milrinone 20 mg/100 mL (200 mcg/mL) infusion Commonly known as:  PRIMACOR  
28.425 mcg/min by IntraVENous route continuous. * sacubitril-valsartan 24-26 mg tablet Commonly known as:  ENTRESTO Take 1 Tab by mouth two (2) times a day. * sacubitril-valsartan 24-26 mg tablet Commonly known as:  ENTRESTO Take 1 Tab by mouth two (2) times a day. spironolactone 25 mg tablet Commonly known as:  ALDACTONE Take 1 Tab by mouth daily. * Notice: This list has 2 medication(s) that are the same as other medications prescribed for you. Read the directions carefully, and ask your doctor or other care provider to review them with you. To-Do List   
 04/27/2017 To Be Determined Appointment with Pepe Blanc RN at [place] [address]  
  
 05/03/2017 To Be Determined Appointment with Pepe Blanc RN at [facility] [address]  
  
 05/10/2017 To Be Determined Appointment with Millicent Kim RN at [place] [address]  
  
 05/17/2017 To Be Determined Appointment with Millicent Kim RN at [place] [address]  
  
 05/24/2017 To Be Determined Appointment with Millicent Kim RN at [place] [address] Patient Instructions 1. Decrease Bumex 1 mg every other day - if  Your weight changes significantly in 1 day (greater than 2 lbs) or you develop symptoms - contact the office. 2.  Will talk with Dr. Kehinde Crawford about timing of echo - whether it will be before or after your next visit. 3.  Continue all other medications as ordered. 4.  Return to clinic in 4 weeks. Introducing [facility]! Heriberto Flores introduces [employer] patient portal. Now you can access parts of your medical record, email your doctor's office, and request medication refills online. 1. In your internet browser, go to [email or web address] 2. Click on the First Time User? Click Here link in the Sign In box. You will see the New Member Sign Up page. 3. Enter your [employer] Access Code exactly as it appears below. You will not need to use this code after youve completed the sign-up process. If you do not sign up before the expiration date, you must request a new code. · [employer] Access Code: [email or web address] Expires: 7/24/2017 11:40 AM 
 
4. Enter the last four digits of your Social Security Number (xxxx) and Date of Birth (mm/dd/yyyy) as indicated and click Submit. You will be taken to the next sign-up page. 5. Create a [employer] ID. This will be your [employer] login ID and cannot be changed, so think of one that is secure and easy to remember. 6. Create a [employer] password. You can change your password at any time. 7. Enter your Password Reset Question and Answer. This can be used at a later time if you forget your password. 8. Enter your e-mail address. You will receive e-mail notification when new information is available in [address] 9. Click Sign Up. You can now view and download portions of your medical record. 10. Click the Download Summary menu link to download a portable copy of your medical information. If you have questions, please visit the Frequently Asked Questions section of the [employer] website. Remember, [employer] is NOT to be used for urgent needs. For medical emergencies, dial 911. Now available from your iPhone and Android! Please provide this summary of care documentation to your next provider. Your primary care clinician is listed as MICHAEL BALLARD. If you have any questions after today's visit, please call [phone].

## 2017-04-25 NOTE — COMMUNICATION BODY
[facility] Clinic Note      NAME:  Fernanda Reece. :   1950   MRN:   [id number]   PCP:  Velasquez Carbajal MD    Date:  2017     IMPRESSION/PLAN:    ICM, EF 10%, home inotrope, NYHA class III  Continue milrinone, Entresto, Coreg, and Spironolactone - unable to increase Entresto d/t hypotension  Decrease Bumex to 1 mg every other day - pt did not do at last visit as instructed. Repeat echo at Dr. Albert Brown' office scheduled for May 15, 2017 at 9am  Return to [facility] in 4 weeks    CAD s/p PCI  Continue ASA, Plavix, Coreg and statin per cardiology          Claudication  ABIs c/w moderate PAD bilaterally   If symptoms worsen, we can arrange appt. w vascular surgery    CKD  Followed by renal  Stable  Recommended decreased Bumex to 1 mg every other day    PAF  SR today  Continue amiodarone, Coreg    H/O lower GIB  Will need colonic polyp and AVM treated at some point - denies BRBPR, melena or any issues  Does not have f/u appt. W/ GI at thistime    H/O Tobacco Abuse  Pt doing well - has stopped using nicotine patch     Diabetes  Insulin management by PCP    Diabetic neuropathy  Continue neurontin 300 mg TID and Cymbalta 30 mg daily  Pt provided w/ free samples of Cymblata today that were sent to our office through the Patient Assitance program        Hepatitis C  Undetectable viral load, will repeat HCV RNA with next lab draw per hepatologist recommendations      Subjective:     Mr. Aditi Mercer is a 60 yo, C male w/ PMH CAD s/p PCI to left main and  of RCA 0n 17,  ICM (LVEF 10%), PAF, nicotine addiction, Hep C (undetectable viral load), h/o GIB w/ colonic polyp and AVM, who presents today for follow up. He reports overall he is doing well and feels better every day. He reports he is performing daily weights and that his weight is \"creeping up\" but he is eating a lot. He does not feel like he is retaining fluid.   He is active doing chores around the house - doing laundry, cleaning etc.  He reports he feels much better. His biggest c/o is pain in his legs when he walks. He reports if he rests the pain goes away. He is compliant with all his medications. He is wearing the Life Vest daily and denies any discharges from the vest.  He is sleeping on 1 pillow and denies orthopnea. Pt denies smoking, drinking or illegal drug use. He stopped using the nictoine patch about 2 weeks ago. He said he \"thinkgs about smoking\" but he doesn't crave it. He reports he is gaining weight about 10 lbs since he left the hospital - he states he is \"eating like a horse\". ROS:   Pt c/o lightheadedness, occ. Cough, pain in legs w/ exertion. Pt denies fatigue, fevers, chills, diaphoresis, dizziness, syncope, N/V, early satiety, chest pain, palpitations, SOB, constipation/diarrhea, edema, depression or anxiety, BRBPR, or melena    Objective:     Visit Vitals    /62    Pulse 82    Temp 97.8 °F (36.6 °C)    Wt 170 lb (77.1 kg)    SpO2 97%    BMI 24.39 kg/m2      Physical Exam  Gen:   WA, WN, NAD  HEENT:  EOMI, no obvious oral lesions  Neck:   supple, (-) adenopathy (-) JVD  CVS:      S1/S2  Pul:  CTA b/l (-) r,r,w - life vest in place  Abd:  +BS, soft, NT  Ext:  (-) edema, L PICC line site (-) erythema (-) swelling  Neuro:   No obvious deficits           Past History:     Past Medical History:   Diagnosis Date    Cardiomyopathy ([facility] [address] 1/20/2017    A. Echo (1/19/17):  EF 5-10% with severe GHK,. Mildly dil LA. Mild TR. PASP 46.    Chronic renal insufficiency 3/6/2017    Diabetes mellitus ([address] 3/6/2017    Dyslipidemia 3/6/2017    A. FLP (1/19/17): Tot 120, , HDL 19, LDL 76 (no Rx).  H/O: GI bleed 3/6/2017    Hepatitis C 3/6/2017    Paroxysmal atrial fibrillation ([address] 3/6/2017     No family history on file. Past Surgical History:   Procedure Laterality Date    COLONOSCOPY N/A 2/17/2017    COLONOSCOPY performed by Alesia Richardson.  Marcel Ho MD at [facility] ENDOSCOPY Social History   Substance Use Topics    Smoking status: Former Smoker     Packs/day: 2.00     Years: 45.00     Quit date: 1/20/2017    Smokeless tobacco: Not on file    Alcohol use No        No family history on file. Allergies: Allergies   Allergen Reactions    Heparin (Porcine) Unknown (comments)        Data Review:    Please see recent labwork scanned in media from 4/12/17      Medications reviewed:    Current Outpatient Prescriptions   Medication Sig    amiodarone (CORDARONE) 200 mg tablet Take 1 Tab by mouth every twelve (12) hours.  spironolactone (ALDACTONE) 25 mg tablet Take 1 Tab by mouth daily.  carvedilol (COREG) 3.125 mg tablet Take 1 Tab by mouth two (2) times daily (with meals).  atorvastatin (LIPITOR) 10 mg tablet Take 1 Tab by mouth daily.  clopidogrel (PLAVIX) 75 mg tab Take 1 Tab by mouth daily.  sacubitril-valsartan (ENTRESTO) 24 mg/26 mg tablet Take 1 Tab by mouth two (2) times a day.  bumetanide (BUMEX) 2 mg tablet Take 0.5 Tabs by mouth daily.  gabapentin (NEURONTIN) 300 mg capsule Take 900 mg by mouth three (3) times daily.  DULoxetine (CYMBALTA) 30 mg capsule Take 1 Cap by mouth daily.  sacubitril-valsartan (ENTRESTO) 24 mg/26 mg tablet Take 1 Tab by mouth two (2) times a day.  aspirin delayed-release 81 mg tablet Take 1 Tab by mouth daily.  magnesium oxide (MAG-OX) 400 mg tablet Take 1 Tab by mouth daily.  milrinone (PRIMACOR) 20 mg/100 mL (200 mcg/mL) infusion 28.425 mcg/min by IntraVENous route continuous.  insulin NPH/insulin regular (NOVOLIN 70/30, HUMULIN 70/30) 100 unit/mL (70-30) injection 10 units w/ breakfast  8 units w/ dinner    Insulin Syringes, Disposable, 1 mL syrg Pt to take 10 units w/ breakfast and 8 units w/ dinner     No current facility-administered medications for this visit. Follow-up Disposition:  Return in about 4 weeks (around 5/23/2017).     Pt seen under the direct supervision of Dr. Chinyere Patel    Thank you for letting us see Mr Ashley Kc with you,    KENYETTA Turcios [id number]

## 2017-04-25 NOTE — PATIENT INSTRUCTIONS
1.  Decrease Bumex 1 mg every other day - if  Your weight changes significantly in 1 day (greater than 2 lbs) or you develop symptoms - contact the office. 2.  Will talk with Dr. Katherine Holcomb about timing of echo - whether it will be before or after your next visit. 3.  Continue all other medications as ordered. 4.  Return to clinic in 4 weeks.

## 2017-04-25 NOTE — PROGRESS NOTES
[facility] Clinic Note      NAME:  Fernanda Quintero. :   1950   MRN:   [id number]   PCP:  Dolly Caballero MD    Date:  2017     IMPRESSION/PLAN:    ICM, EF 10%, home inotrope, NYHA class III  Continue milrinone, Entresto, Coreg, and Spironolactone - unable to increase Entresto d/t hypotension  Decrease Bumex to 1 mg every other day - pt did not do at last visit as instructed. Repeat echo at Dr. Paul Faulkner' office scheduled for May 15, 2017 at 9am  Pt to continue daily weights, low sodium diet and fluid consumption to 2 L/daily  Return to [facility] in 4 weeks    CAD s/p PCI  Continue ASA, Plavix, Coreg and statin per cardiology          Claudication  ABIs c/w moderate PAD bilaterally   If symptoms worsen, we can arrange appt. w vascular surgery    CKD  Followed by renal  Stable  Recommended decreased Bumex to 1 mg every other day    PAF  SR today  Continue amiodarone, Coreg    H/O lower GIB  Will need colonic polyp and AVM treated at some point - denies BRBPR, melena or any issues  Does not have f/u appt. W/ GI at thistime    H/O Tobacco Abuse  Pt doing well - has stopped using nicotine patch     Diabetes  Insulin management by PCP    Diabetic neuropathy  Continue neurontin 300 mg TID and Cymbalta 30 mg daily  Pt provided w/ free samples of Cymblata today that were sent to our office through the Patient Assitance program        Hepatitis C  Undetectable viral load, will repeat HCV RNA with next lab draw per hepatologist recommendations      Subjective:     Mr. Kwadwo Moraes is a 60 yo, C male w/ PMH CAD s/p PCI to left main and  of RCA 0n 17,  ICM (LVEF 10%), PAF, nicotine addiction, Hep C (undetectable viral load), h/o GIB w/ colonic polyp and AVM, who presents today for follow up. He reports overall he is doing well and feels better every day. He reports he is performing daily weights and that his weight is \"creeping up\" but he is eating a lot.   He does not feel like he is retaining fluid. He is active doing chores around the house - doing laundry, cleaning etc.  He reports he feels much better. His biggest c/o is pain in his legs when he walks. He reports if he rests the pain goes away. He is compliant with all his medications. He is watching his salt and fluid consumption. He is wearing the Life Vest daily and denies any discharges from the vest.  He is sleeping on 1 pillow and denies orthopnea. Pt denies smoking, drinking or illegal drug use. He stopped using the nictoine patch about 2 weeks ago. He said he \"thinkgs about smoking\" but he doesn't crave it. He reports he is gaining weight about 10 lbs since he left the hospital - he states he is \"eating like a horse\". ROS:   Pt c/o lightheadedness, occ. Cough, pain in legs w/ exertion. Pt denies fatigue, fevers, chills, diaphoresis, dizziness, syncope, N/V, early satiety, chest pain, palpitations, SOB, constipation/diarrhea, edema, depression or anxiety, BRBPR, or melena    Objective:     Visit Vitals    /62    Pulse 82    Temp 97.8 °F (36.6 °C)    Wt 170 lb (77.1 kg)    SpO2 97%    BMI 24.39 kg/m2      Physical Exam  Gen:   WA, WN, NAD  HEENT:  EOMI, no obvious oral lesions  Neck:   supple, (-) adenopathy (-) JVD  CVS:      S1/S2  Pul:  CTA b/l (-) r,r,w - life vest in place  Abd:  +BS, soft, NT  Ext:  (-) edema, L PICC line site (-) erythema (-) swelling  Neuro:   No obvious deficits           Past History:     Past Medical History:   Diagnosis Date    [facility] 1/20/2017    A. Echo (1/19/17):  EF 5-10% with severe GHK,. Mildly dil LA. Mild TR. PASP 46.    Chronic renal insufficiency 3/6/2017    Diabetes mellitus ([facility] [address] 3/6/2017    Dyslipidemia 3/6/2017    A. FLP (1/19/17): Tot 120, , HDL 19, LDL 76 (no Rx).  H/O: GI bleed 3/6/2017    Hepatitis C 3/6/2017    Paroxysmal atrial fibrillation ([facility] [address] 3/6/2017     No family history on file.       Past Surgical History: Procedure Laterality Date    COLONOSCOPY N/A 2/17/2017    COLONOSCOPY performed by Nader Simental. Anisa Calvo MD at [facility] ENDOSCOPY       Social History   Substance Use Topics    Smoking status: Former Smoker     Packs/day: 2.00     Years: 45.00     Quit date: 1/20/2017    Smokeless tobacco: Not on file    Alcohol use No        No family history on file. Allergies: Allergies   Allergen Reactions    Heparin (Porcine) Unknown (comments)        Data Review:    Please see recent labwork scanned in media from 4/12/17      Medications reviewed:    Current Outpatient Prescriptions   Medication Sig    amiodarone (CORDARONE) 200 mg tablet Take 1 Tab by mouth every twelve (12) hours.  spironolactone (ALDACTONE) 25 mg tablet Take 1 Tab by mouth daily.  carvedilol (COREG) 3.125 mg tablet Take 1 Tab by mouth two (2) times daily (with meals).  atorvastatin (LIPITOR) 10 mg tablet Take 1 Tab by mouth daily.  clopidogrel (PLAVIX) 75 mg tab Take 1 Tab by mouth daily.  sacubitril-valsartan (ENTRESTO) 24 mg/26 mg tablet Take 1 Tab by mouth two (2) times a day.  bumetanide (BUMEX) 2 mg tablet Take 0.5 Tabs by mouth daily.  gabapentin (NEURONTIN) 300 mg capsule Take 900 mg by mouth three (3) times daily.  DULoxetine (CYMBALTA) 30 mg capsule Take 1 Cap by mouth daily.  sacubitril-valsartan (ENTRESTO) 24 mg/26 mg tablet Take 1 Tab by mouth two (2) times a day.  aspirin delayed-release 81 mg tablet Take 1 Tab by mouth daily.  magnesium oxide (MAG-OX) 400 mg tablet Take 1 Tab by mouth daily.  milrinone (PRIMACOR) 20 mg/100 mL (200 mcg/mL) infusion 28.425 mcg/min by IntraVENous route continuous.  insulin NPH/insulin regular (NOVOLIN 70/30, HUMULIN 70/30) 100 unit/mL (70-30) injection 10 units w/ breakfast  8 units w/ dinner    Insulin Syringes, Disposable, 1 mL syrg Pt to take 10 units w/ breakfast and 8 units w/ dinner     No current facility-administered medications for this visit. Follow-up Disposition:  Return in about 4 weeks (around 5/23/2017).     Pt seen under the direct supervision of Dr. Dmitriy Edwards    Thank you for letting us see Mr Damaris Isaias with you,    KENYETTA Elder [id number]

## 2017-04-27 ENCOUNTER — HOME CARE VISIT (OUTPATIENT)
Dept: SCHEDULING | Facility: HOME HEALTH | Age: 67
End: 2017-04-27

## 2017-04-27 PROCEDURE — G0300 HHS/HOSPICE OF LPN EA 15 MIN: HCPCS

## 2017-04-28 VITALS
RESPIRATION RATE: 20 BRPM | BODY MASS INDEX: 23.68 KG/M2 | HEART RATE: 78 BPM | DIASTOLIC BLOOD PRESSURE: 58 MMHG | TEMPERATURE: 98.5 F | WEIGHT: 165 LBS | OXYGEN SATURATION: 98 % | SYSTOLIC BLOOD PRESSURE: 108 MMHG

## 2017-05-04 ENCOUNTER — HOME CARE VISIT (OUTPATIENT)
Dept: SCHEDULING | Facility: HOME HEALTH | Age: 67
End: 2017-05-04

## 2017-05-04 PROCEDURE — G0300 HHS/HOSPICE OF LPN EA 15 MIN: HCPCS

## 2017-05-05 VITALS
TEMPERATURE: 98 F | BODY MASS INDEX: 23.1 KG/M2 | DIASTOLIC BLOOD PRESSURE: 62 MMHG | WEIGHT: 161 LBS | SYSTOLIC BLOOD PRESSURE: 100 MMHG | OXYGEN SATURATION: 98 % | RESPIRATION RATE: 20 BRPM | HEART RATE: 85 BPM

## 2017-05-08 ENCOUNTER — DOCUMENTATION ONLY (OUTPATIENT)
Dept: CARDIOLOGY CLINIC | Age: 67
End: 2017-05-08

## 2017-05-08 NOTE — PROGRESS NOTES
Received phone call from NICHOLASHAYNE with [facility] ([phone]) wanting to know if any labs needed to be drawn for this patient on a weekly basis. Shared this information with Smith Lady who indicated she wants a BMP, CBC without diff and a tox screen completed.  Notified Vazquez of Lisa's request.    Holly Vanessa, MSW, LCSW    Clinical    Jennifer Roman [phone]

## 2017-05-11 ENCOUNTER — HOME CARE VISIT (OUTPATIENT)
Dept: SCHEDULING | Facility: HOME HEALTH | Age: 67
End: 2017-05-11

## 2017-05-11 PROCEDURE — G0300 HHS/HOSPICE OF LPN EA 15 MIN: HCPCS

## 2017-05-14 VITALS
WEIGHT: 161 LBS | TEMPERATURE: 98.3 F | DIASTOLIC BLOOD PRESSURE: 72 MMHG | OXYGEN SATURATION: 98 % | SYSTOLIC BLOOD PRESSURE: 124 MMHG | HEART RATE: 80 BPM | RESPIRATION RATE: 20 BRPM | BODY MASS INDEX: 23.1 KG/M2

## 2017-05-15 ENCOUNTER — CLINICAL SUPPORT (OUTPATIENT)
Dept: CARDIOLOGY CLINIC | Age: 67
End: 2017-05-15

## 2017-05-15 DIAGNOSIS — I25.5 ISCHEMIC CARDIOMYOPATHY: Primary | ICD-10-CM

## 2017-05-17 ENCOUNTER — HOME CARE VISIT (OUTPATIENT)
Dept: SCHEDULING | Facility: HOME HEALTH | Age: 67
End: 2017-05-17

## 2017-05-17 PROCEDURE — G0299 HHS/HOSPICE OF RN EA 15 MIN: HCPCS

## 2017-05-22 ENCOUNTER — OFFICE VISIT (OUTPATIENT)
Dept: CARDIOLOGY CLINIC | Age: 67
End: 2017-05-22

## 2017-05-22 VITALS
SYSTOLIC BLOOD PRESSURE: 118 MMHG | RESPIRATION RATE: 16 BRPM | WEIGHT: 169.6 LBS | HEART RATE: 74 BPM | BODY MASS INDEX: 24.28 KG/M2 | DIASTOLIC BLOOD PRESSURE: 72 MMHG | OXYGEN SATURATION: 98 % | TEMPERATURE: 97.5 F | HEIGHT: 70 IN

## 2017-05-22 DIAGNOSIS — E11.8 TYPE 2 DIABETES MELLITUS WITH COMPLICATION, UNSPECIFIED LONG TERM INSULIN USE STATUS: ICD-10-CM

## 2017-05-22 DIAGNOSIS — N18.9 CHRONIC RENAL INSUFFICIENCY, UNSPECIFIED STAGE: ICD-10-CM

## 2017-05-22 DIAGNOSIS — I25.5 ISCHEMIC CARDIOMYOPATHY: Primary | ICD-10-CM

## 2017-05-22 NOTE — LETTER
2017 2:01 PM 
 
Patient:  Blair Elliott. YOB: 1950 Date of Visit: 2017 Dear Mickie Scott MD 
[address]  [address] [address] VIA In Basket Judi Chapman MD 
[facility] [address] VIA Facsimile: [phone] Merlin Alba, MD 
[address] [facility] [address] [facility] [address] [address] VIA In Basket 
 : Thank you for referring Mr. Ksenia Salcedo to me for evaluation/treatment. Below are the relevant portions of my assessment and plan of care. [facility]:  Blair Elliott. :   1950 MRN:   [id number] PCP:  Mickie Scott MD 
 
Date:  May 22, 2017 IMPRESSION/PLAN: 
 
ICM, EF 25%, home inotrope, NYHA class II-III Continue milrinone, Entresto, Coreg, and Spironolactone - unable to increase Entresto d/t hypotension Cont Bumex to 1 mg every other day - pt did not do at last visit as instructed. Repeat TTE- showed EF improved to 25%, will discuss timing of AICD with Dr. Claus Dixon. Pt to continue daily weights, low sodium diet and fluid consumption to 2 L/daily Return to [facility] in 4-6 weeks CAD s/p PCI Continue ASA, Plavix, Coreg and statin per cardiology Claudication ABIs c/w moderate PAD bilaterally If symptoms worsen, we can arrange appt. w vascular surgery CKD Followed by renal 
Stable, repeat labs today Bumex 1 mg every other day PAF Continue amiodarone, Coreg H/O lower GIB Will need colonic polyp and AVM treated at some point - denies BRBPR, melena or any issues Will schedule follow up with GI after he is off plavix H/O Tobacco Abuse Remains off nicotine patch Diabetes Insulin management by PCP Diabetic neuropathy Continue neurontin 300 mg TID and Cymbalta 30 mg daily Pt provided w/ free samples of Cymblata today that were sent to our office through the Patient Assitance program 
     
Hepatitis C 
 Undetectable viral load, will repeat HCV RNA with next lab draw per hepatologist recommendations Subjective:  
 
Mr. Monique Torres is a 60 yo, C male w/ PMH CAD s/p PCI to left main and  of RCA 0n 2/9/17,  ICM (LVEF 10%), PAF, nicotine addiction, Hep C (undetectable viral load), h/o GIB w/ colonic polyp and AVM, who presents today for follow up. He reports overall he is doing well, he has good days where he can do yard work as well as bad days where he stays in bed. He reports he is performing daily weights and that his weight is trending up but he continues to eat well and he does not feel like he is retaining fluid. He is compliant with all his medications. He is watching his salt and fluid consumption. He is wearing the Life Vest daily and denies any discharges from the vest.  He is sleeping on 1 pillow and denies orthopnea. Pt denies smoking, drinking or illegal drug use. He has done well off the nicotine patch. ROS:  
Pt c/o leg pain with walking long distances, rare headaches when he hasn't eaten. Pt denies HA, fatigue, fevers, chills, diaphoresis, dizziness, syncope, N/V, early satiety, chest pain, palpitations, SOB, constipation/diarrhea, edema, depression or anxiety, BRBPR, or melena Objective:  
 
Visit Vitals  /72 (BP 1 Location: Left arm, BP Patient Position: Sitting)  Pulse 74  Temp 97.5 °F (36.4 °C)  Resp 16  
 Ht 5' 10\" (1.778 m)  Wt 169 lb 9.6 oz (76.9 kg)  SpO2 98%  BMI 24.34 kg/m2 Physical Exam 
Gen:   WA, WN, NAD HEENT:  EOMI, Neck:   supple, (-) adenopathy (-) JVD 
CVS:      S1/S2, no murmur. Pul:  CTA b/l (-) r,r,w, life vest in place Abd:  +BS, soft, NT Ext:  (-) edema, L PICC line site (-) erythema (-) swelling Neuro:   No obvious deficits Past History:  
 
Past Medical History:  
Diagnosis Date  Cardiomyopathy ([facility] [address] 1/20/2017 A. Echo (1/19/17):  EF 5-10% with severe GHK,. Mildly dil LA. Mild TR. PASP 46.  
 Chronic renal insufficiency 3/6/2017  Diabetes mellitus ([facility] [address] 3/6/2017  Dyslipidemia 3/6/2017 A. FLP (1/19/17): Tot 120, , HDL 19, LDL 76 (no Rx).  H/O: GI bleed 3/6/2017  Hepatitis C 3/6/2017  Paroxysmal atrial fibrillation ([facility] [address] 3/6/2017 No family history on file. Past Surgical History:  
Procedure Laterality Date  COLONOSCOPY N/A 2/17/2017 COLONOSCOPY performed by Alexander Paulson. Norris Mcdonough MD at [facility] ENDOSCOPY Social History Substance Use Topics  Smoking status: Former Smoker Packs/day: 2.00 Years: 45.00 Quit date: 1/20/2017  Smokeless tobacco: Never Used  Alcohol use No  
  
 
No family history on file. Allergies: Allergies Allergen Reactions  Heparin (Porcine) Unknown (comments) Data Review: BMP pending today Medications reviewed: 
 
Current Outpatient Prescriptions Medication Sig  
 amiodarone (CORDARONE) 200 mg tablet Take 1 Tab by mouth every twelve (12) hours.  spironolactone (ALDACTONE) 25 mg tablet Take 1 Tab by mouth daily.  carvedilol (COREG) 3.125 mg tablet Take 1 Tab by mouth two (2) times daily (with meals).  atorvastatin (LIPITOR) 10 mg tablet Take 1 Tab by mouth daily.  clopidogrel (PLAVIX) 75 mg tab Take 1 Tab by mouth daily.  bumetanide (BUMEX) 2 mg tablet Take 0.5 Tabs by mouth daily. (Patient taking differently: Take 0.5 Tabs by mouth every other day.)  DULoxetine (CYMBALTA) 30 mg capsule Take 1 Cap by mouth daily.  sacubitril-valsartan (ENTRESTO) 24 mg/26 mg tablet Take 1 Tab by mouth two (2) times a day.  aspirin delayed-release 81 mg tablet Take 1 Tab by mouth daily.  magnesium oxide (MAG-OX) 400 mg tablet Take 1 Tab by mouth daily.  milrinone (PRIMACOR) 20 mg/100 mL (200 mcg/mL) infusion 28.425 mcg/min by IntraVENous route continuous.   
 insulin NPH/insulin regular (NOVOLIN 70/30, HUMULIN 70/30) 100 unit/mL (70-30) injection 10 units w/ breakfast 
 8 units w/ dinner  Insulin Syringes, Disposable, 1 mL syrg Pt to take 10 units w/ breakfast and 8 units w/ dinner  gabapentin (NEURONTIN) 300 mg capsule Take 900 mg by mouth three (3) times daily. No current facility-administered medications for this visit. Follow-up Disposition: 
Return in about 5 weeks (around 6/26/2017). Thank you for letting us see Mr David Cole with you, MONICA Zuluaga Roman [phone] If you have questions, please do not hesitate to call me. I look forward to following Mr. David Cole along with you. Sincerely, Lazaro Nicole MD

## 2017-05-22 NOTE — MR AVS SNAPSHOT
Visit Information Date & Time Provider Department Dept. Phone Encounter #  
 5/22/2017  1:00 PM Nilsa Heath MD [address] [id number] Follow-up Instructions Return in about 5 weeks (around 6/26/2017). Your Appointments 8/15/2017  3:00 PM  
ESTABLISHED PATIENT with Mark Anthony Membreno MD  
[facility] ([facility] SCHEDULING) Appt Note: 5 mo fup  
 320 [facility] [address] [place] [address] Road  
54 [facility] [address] [address] Upcoming Health Maintenance Date Due  
 FOOT EXAM Q1 11/23/1962 MICROALBUMIN Q1 11/23/1962 EYE EXAM RETINAL OR DILATED Q1 11/23/1962 Pneumococcal 19-64 Medium Risk (1 of 1 - PPSV23) 11/23/1971 DTaP/Tdap/Td series (1 - Tdap) 11/23/1973 ZOSTER VACCINE AGE 60> 11/23/2012 HEMOGLOBIN A1C Q6M 7/19/2017 INFLUENZA AGE 9 TO ADULT 8/1/2017 LIPID PANEL Q1 1/19/2018 FOBT Q 1 YEAR AGE 50-75 2/16/2018 Allergies as of 5/22/2017  Review Complete On: 5/22/2017 By: Adalberto Burciaga NP Severity Noted Reaction Type Reactions Heparin (Porcine)  02/07/2017    Unknown (comments) Current Immunizations  Reviewed on 2/14/2017 No immunizations on file. Not reviewed this visit You Were Diagnosed With   
  
 Codes Comments Ischemic cardiomyopathy    -  Primary ICD-10-CM: I25.5 ICD-9-CM: 414.8 Chronic renal insufficiency, unspecified stage     ICD-10-CM: N18.9 ICD-9-CM: 688. 9 Type 2 diabetes mellitus with complication, unspecified long term insulin use status     ICD-10-CM: E11.8 ICD-9-CM: 250.90 Vitals BP Pulse Temp Resp Height(growth percentile) Weight(growth percentile) 118/72 (BP 1 Location: Left arm, BP Patient Position: Sitting) 74 97.5 °F (36.4 °C) 16 5' 10\" (1.778 m) 169 lb 9.6 oz (76.9 kg) SpO2 BMI Smoking Status 98% 24.34 kg/m2 Former Smoker Vitals History BMI and BSA Data Body Mass Index Body Surface Area  
 24.34 kg/m 2 1.95 m 2 Preferred Pharmacy Pharmacy Name Phone [facility] Leonor LOU Malakoff [phone] Your Updated Medication List  
  
   
This list is accurate as of: 5/22/17  1:51 PM.  Always use your most recent med list.  
  
  
  
  
 amiodarone 200 mg tablet Commonly known as:  CORDARONE Take 1 Tab by mouth every twelve (12) hours. aspirin delayed-release 81 mg tablet Take 1 Tab by mouth daily. atorvastatin 10 mg tablet Commonly known as:  LIPITOR Take 1 Tab by mouth daily. bumetanide 2 mg tablet Commonly known as:  Beckey Pancoast Take 0.5 Tabs by mouth daily. carvedilol 3.125 mg tablet Commonly known as:  Geoffrey Shy Take 1 Tab by mouth two (2) times daily (with meals). clopidogrel 75 mg Tab Commonly known as:  PLAVIX Take 1 Tab by mouth daily. DULoxetine 30 mg capsule Commonly known as:  CYMBALTA Take 1 Cap by mouth daily. gabapentin 300 mg capsule Commonly known as:  NEURONTIN Take 900 mg by mouth three (3) times daily. insulin NPH/insulin regular 100 unit/mL (70-30) injection Commonly known as:  NOVOLIN 70/30, HUMULIN 70/30  
10 units w/ breakfast 8 units w/ dinner Insulin Syringes (Disposable) 1 mL Syrg Pt to take 10 units w/ breakfast and 8 units w/ dinner  
  
 magnesium oxide 400 mg tablet Commonly known as:  MAG-OX Take 1 Tab by mouth daily. milrinone 20 mg/100 mL (200 mcg/mL) infusion Commonly known as:  PRIMACOR  
28.425 mcg/min by IntraVENous route continuous. sacubitril-valsartan 24-26 mg tablet Commonly known as:  ENTRESTO Take 1 Tab by mouth two (2) times a day. spironolactone 25 mg tablet Commonly known as:  ALDACTONE Take 1 Tab by mouth daily. We Performed the Following METABOLIC PANEL, BASIC [41548 CPT(R)] Follow-up Instructions Return in about 5 weeks (around 6/26/2017). To-Do List   
 05/24/2017 To Be Determined Appointment with Donnie Bautista RN at [place] [address] Patient Instructions No changes to medications today Please cont your milrinone We will talk with Dr. Albert Brown about the possibility of AICD implant Follow up in 4-6 weeks Labs today Introducing [facility] SERVICES! Miguel Archibald introduces [employer] patient portal. Now you can access parts of your medical record, email your doctor's office, and request medication refills online. 1. In your internet browser, go to [email or web address] 2. Click on the First Time User? Click Here link in the Sign In box. You will see the New Member Sign Up page. 3. Enter your [employer] Access Code exactly as it appears below. You will not need to use this code after youve completed the sign-up process. If you do not sign up before the expiration date, you must request a new code. · [employer] Access Code: [email or web address] Expires: 7/24/2017 11:40 AM 
 
4. Enter the last four digits of your Social Security Number (xxxx) and Date of Birth (mm/dd/yyyy) as indicated and click Submit. You will be taken to the next sign-up page. 5. Create a [employer] ID. This will be your [employer] login ID and cannot be changed, so think of one that is secure and easy to remember. 6. Create a [employer] password. You can change your password at any time. 7. Enter your Password Reset Question and Answer. This can be used at a later time if you forget your password. 8. Enter your e-mail address. You will receive e-mail notification when new information is available in [address] 9. Click Sign Up. You can now view and download portions of your medical record. 10. Click the Download Summary menu link to download a portable copy of your medical information. If you have questions, please visit the Frequently Asked Questions section of the [employer] website. Remember, [employer] is NOT to be used for urgent needs. For medical emergencies, dial 911. Now available from your iPhone and Android! Please provide this summary of care documentation to your next provider. Your primary care clinician is listed as MICHAEL BALLARD. If you have any questions after today's visit, please call [phone].

## 2017-05-22 NOTE — COMMUNICATION BODY
[facility] Clinic Note      NAME:  Fernanda Branham. :   1950   MRN:   [id number]   PCP:  Thalia Samuels MD    Date:  May 22, 2017     IMPRESSION/PLAN:    ICM, EF 25%, home inotrope, NYHA class II-III  Continue milrinone, Entresto, Coreg, and Spironolactone - unable to increase Entresto d/t hypotension  Cont Bumex to 1 mg every other day - pt did not do at last visit as instructed. Repeat TTE- showed EF improved to 25%, will discuss timing of AICD with Dr. Ralph Kirkland. Pt to continue daily weights, low sodium diet and fluid consumption to 2 L/daily  Return to [facility] in 4-6 weeks    CAD s/p PCI  Continue ASA, Plavix, Coreg and statin per cardiology          Claudication  ABIs c/w moderate PAD bilaterally   If symptoms worsen, we can arrange appt. w vascular surgery    CKD  Followed by renal  Stable, repeat labs today    Bumex 1 mg every other day    PAF  Continue amiodarone, Coreg    H/O lower GIB  Will need colonic polyp and AVM treated at some point - denies BRBPR, melena or any issues  Will schedule follow up with GI after he is off plavix    H/O Tobacco Abuse  Remains off nicotine patch     Diabetes  Insulin management by PCP    Diabetic neuropathy  Continue neurontin 300 mg TID and Cymbalta 30 mg daily  Pt provided w/ free samples of Cymblata today that were sent to our office through the Patient Assitance program        Hepatitis C  Undetectable viral load, will repeat HCV RNA with next lab draw per hepatologist recommendations      Subjective:     Mr. Ariadne Edmonds is a 60 yo, C male w/ PMH CAD s/p PCI to left main and  of RCA 0n 17,  ICM (LVEF 10%), PAF, nicotine addiction, Hep C (undetectable viral load), h/o GIB w/ colonic polyp and AVM, who presents today for follow up. He reports overall he is doing well, he has good days where he can do yard work as well as bad days where he stays in bed.     He reports he is performing daily weights and that his weight is trending up but he continues to eat well and he does not feel like he is retaining fluid. He is compliant with all his medications. He is watching his salt and fluid consumption. He is wearing the Life Vest daily and denies any discharges from the vest.  He is sleeping on 1 pillow and denies orthopnea. Pt denies smoking, drinking or illegal drug use. He has done well off the nicotine patch. ROS:   Pt c/o leg pain with walking long distances, rare headaches when he hasn't eaten. Pt denies HA, fatigue, fevers, chills, diaphoresis, dizziness, syncope, N/V, early satiety, chest pain, palpitations, SOB, constipation/diarrhea, edema, depression or anxiety, BRBPR, or melena    Objective:     Visit Vitals    /72 (BP 1 Location: Left arm, BP Patient Position: Sitting)    Pulse 74    Temp 97.5 °F (36.4 °C)    Resp 16    Ht 5' 10\" (1.778 m)    Wt 169 lb 9.6 oz (76.9 kg)    SpO2 98%    BMI 24.34 kg/m2      Physical Exam  Gen:   WA, WN, NAD  HEENT:  EOMI,   Neck:   supple, (-) adenopathy (-) JVD  CVS:      S1/S2, no murmur. Pul:  CTA b/l (-) r,r,w, life vest in place  Abd:  +BS, soft, NT  Ext:  (-) edema, L PICC line site (-) erythema (-) swelling  Neuro:   No obvious deficits           Past History:     Past Medical History:   Diagnosis Date    Cardiomyopathy ([facility] [address] 1/20/2017    A. Echo (1/19/17):  EF 5-10% with severe GHK,. Mildly dil LA. Mild TR. PASP 46.    Chronic renal insufficiency 3/6/2017    Diabetes mellitus ([address] 3/6/2017    Dyslipidemia 3/6/2017    A. FLP (1/19/17): Tot 120, , HDL 19, LDL 76 (no Rx).  H/O: GI bleed 3/6/2017    Hepatitis C 3/6/2017    Paroxysmal atrial fibrillation ([address] 3/6/2017     No family history on file. Past Surgical History:   Procedure Laterality Date    COLONOSCOPY N/A 2/17/2017    COLONOSCOPY performed by Leilani cShultz MD at [facility] ENDOSCOPY       Social History   Substance Use Topics    Smoking status: Former Smoker     Packs/day: 2.00 Years: 37.1     Quit date: 1/20/2017    Smokeless tobacco: Never Used    Alcohol use No        No family history on file. Allergies: Allergies   Allergen Reactions    Heparin (Porcine) Unknown (comments)        Data Review:   BMP pending today       Medications reviewed:    Current Outpatient Prescriptions   Medication Sig    amiodarone (CORDARONE) 200 mg tablet Take 1 Tab by mouth every twelve (12) hours.  spironolactone (ALDACTONE) 25 mg tablet Take 1 Tab by mouth daily.  carvedilol (COREG) 3.125 mg tablet Take 1 Tab by mouth two (2) times daily (with meals).  atorvastatin (LIPITOR) 10 mg tablet Take 1 Tab by mouth daily.  clopidogrel (PLAVIX) 75 mg tab Take 1 Tab by mouth daily.  bumetanide (BUMEX) 2 mg tablet Take 0.5 Tabs by mouth daily. (Patient taking differently: Take 0.5 Tabs by mouth every other day.)    DULoxetine (CYMBALTA) 30 mg capsule Take 1 Cap by mouth daily.  sacubitril-valsartan (ENTRESTO) 24 mg/26 mg tablet Take 1 Tab by mouth two (2) times a day.  aspirin delayed-release 81 mg tablet Take 1 Tab by mouth daily.  magnesium oxide (MAG-OX) 400 mg tablet Take 1 Tab by mouth daily.  milrinone (PRIMACOR) 20 mg/100 mL (200 mcg/mL) infusion 28.425 mcg/min by IntraVENous route continuous.  insulin NPH/insulin regular (NOVOLIN 70/30, HUMULIN 70/30) 100 unit/mL (70-30) injection 10 units w/ breakfast  8 units w/ dinner    Insulin Syringes, Disposable, 1 mL syrg Pt to take 10 units w/ breakfast and 8 units w/ dinner    gabapentin (NEURONTIN) 300 mg capsule Take 900 mg by mouth three (3) times daily. No current facility-administered medications for this visit. Follow-up Disposition:  Return in about 5 weeks (around 6/26/2017).       Thank you for letting us see Mr Boston Quinn with you,    Mikki Kerns, MONICA Roman [phone]

## 2017-05-22 NOTE — PROGRESS NOTES
[facility] Clinic Note      NAME:  Fernanda Nascimento :   1950   MRN:   [id number]   PCP:  Gus Stratton MD    Date:  May 22, 2017     IMPRESSION/PLAN:    ICM, EF 25%, home inotrope, NYHA class II-III  Continue milrinone, Entresto, Coreg, and Spironolactone - unable to increase Entresto d/t hypotension  Cont Bumex to 1 mg every other day - pt did not do at last visit as instructed. Repeat TTE- showed EF improved to 25%, will discuss timing of AICD with Dr. Hernandez De La Rosa. Pt to continue daily weights, low sodium diet and fluid consumption to 2 L/daily  Return to [facility] in 4-6 weeks    CAD s/p PCI  Continue ASA, Plavix, Coreg and statin per cardiology          Claudication  ABIs c/w moderate PAD bilaterally   If symptoms worsen, we can arrange appt. w vascular surgery    CKD  Followed by renal  Stable, repeat labs today    Bumex 1 mg every other day    PAF  Continue amiodarone, Coreg    H/O lower GIB  Will need colonic polyp and AVM treated at some point - denies BRBPR, melena or any issues  Will schedule follow up with GI after he is off plavix    H/O Tobacco Abuse  Remains off nicotine patch     Diabetes  Insulin management by PCP    Diabetic neuropathy  Continue neurontin 300 mg TID and Cymbalta 30 mg daily  Pt provided w/ free samples of Cymblata today that were sent to our office through the Patient Assitance program        Hepatitis C  Undetectable viral load, will repeat HCV RNA with next lab draw per hepatologist recommendations      Subjective:     Mr. Javier Chung is a 60 yo, C male w/ PMH CAD s/p PCI to left main and  of RCA 0n 17,  ICM (LVEF 10%), PAF, nicotine addiction, Hep C (undetectable viral load), h/o GIB w/ colonic polyp and AVM, who presents today for follow up. He reports overall he is doing well, he has good days where he can do yard work as well as bad days where he stays in bed.     He reports he is performing daily weights and that his weight is trending up but he continues to eat well and he does not feel like he is retaining fluid. He is compliant with all his medications. He is watching his salt and fluid consumption. He is wearing the Life Vest daily and denies any discharges from the vest.  He is sleeping on 1 pillow and denies orthopnea. Pt denies smoking, drinking or illegal drug use. He has done well off the nicotine patch. ROS:   Pt c/o leg pain with walking long distances, rare headaches when he hasn't eaten. Pt denies HA, fatigue, fevers, chills, diaphoresis, dizziness, syncope, N/V, early satiety, chest pain, palpitations, SOB, constipation/diarrhea, edema, depression or anxiety, BRBPR, or melena    Objective:     Visit Vitals    /72 (BP 1 Location: Left arm, BP Patient Position: Sitting)    Pulse 74    Temp 97.5 °F (36.4 °C)    Resp 16    Ht 5' 10\" (1.778 m)    Wt 169 lb 9.6 oz (76.9 kg)    SpO2 98%    BMI 24.34 kg/m2      Physical Exam  Gen:   WA, WN, NAD  HEENT:  EOMI,   Neck:   supple, (-) adenopathy (-) JVD  CVS:      S1/S2, no murmur. Pul:  CTA b/l (-) r,r,w, life vest in place  Abd:  +BS, soft, NT  Ext:  (-) edema, L PICC line site (-) erythema (-) swelling  Neuro:   No obvious deficits           Past History:     Past Medical History:   Diagnosis Date    Cardiomyopathy ([facility] [address] 1/20/2017    A. Echo (1/19/17):  EF 5-10% with severe GHK,. Mildly dil LA. Mild TR. PASP 46.    Chronic renal insufficiency 3/6/2017    Diabetes mellitus ([address] 3/6/2017    Dyslipidemia 3/6/2017    A. FLP (1/19/17): Tot 120, , HDL 19, LDL 76 (no Rx).  H/O: GI bleed 3/6/2017    Hepatitis C 3/6/2017    Paroxysmal atrial fibrillation ([address] 3/6/2017     No family history on file. Past Surgical History:   Procedure Laterality Date    COLONOSCOPY N/A 2/17/2017    COLONOSCOPY performed by Nader Lange Ego, MD at [facility] ENDOSCOPY       Social History   Substance Use Topics    Smoking status: Former Smoker     Packs/day: 2.00 Years: 37.1     Quit date: 1/20/2017    Smokeless tobacco: Never Used    Alcohol use No        No family history on file. Allergies: Allergies   Allergen Reactions    Heparin (Porcine) Unknown (comments)        Data Review:   BMP pending today       Medications reviewed:    Current Outpatient Prescriptions   Medication Sig    amiodarone (CORDARONE) 200 mg tablet Take 1 Tab by mouth every twelve (12) hours.  spironolactone (ALDACTONE) 25 mg tablet Take 1 Tab by mouth daily.  carvedilol (COREG) 3.125 mg tablet Take 1 Tab by mouth two (2) times daily (with meals).  atorvastatin (LIPITOR) 10 mg tablet Take 1 Tab by mouth daily.  clopidogrel (PLAVIX) 75 mg tab Take 1 Tab by mouth daily.  bumetanide (BUMEX) 2 mg tablet Take 0.5 Tabs by mouth daily. (Patient taking differently: Take 0.5 Tabs by mouth every other day.)    DULoxetine (CYMBALTA) 30 mg capsule Take 1 Cap by mouth daily.  sacubitril-valsartan (ENTRESTO) 24 mg/26 mg tablet Take 1 Tab by mouth two (2) times a day.  aspirin delayed-release 81 mg tablet Take 1 Tab by mouth daily.  magnesium oxide (MAG-OX) 400 mg tablet Take 1 Tab by mouth daily.  milrinone (PRIMACOR) 20 mg/100 mL (200 mcg/mL) infusion 28.425 mcg/min by IntraVENous route continuous.  insulin NPH/insulin regular (NOVOLIN 70/30, HUMULIN 70/30) 100 unit/mL (70-30) injection 10 units w/ breakfast  8 units w/ dinner    Insulin Syringes, Disposable, 1 mL syrg Pt to take 10 units w/ breakfast and 8 units w/ dinner    gabapentin (NEURONTIN) 300 mg capsule Take 900 mg by mouth three (3) times daily. No current facility-administered medications for this visit. Follow-up Disposition:  Return in about 5 weeks (around 6/26/2017).       Thank you for letting us see Mr Ariadne Edmonds with you,    MONICA Moreno [phone]

## 2017-05-22 NOTE — PATIENT INSTRUCTIONS
No changes to medications today    Please cont your milrinone    We will talk with Dr. Courtney Lorenzo about the possibility of AICD implant    Follow up in 4-6 weeks     Labs today

## 2017-05-23 ENCOUNTER — TELEPHONE (OUTPATIENT)
Dept: CARDIOLOGY CLINIC | Age: 67
End: 2017-05-23

## 2017-05-23 LAB
BUN SERPL-MCNC: 41 MG/DL (ref 8–27)
BUN/CREAT SERPL: 26 (ref 10–24)
CALCIUM SERPL-MCNC: 9.2 MG/DL (ref 8.6–10.2)
CHLORIDE SERPL-SCNC: 98 MMOL/L (ref 96–106)
CO2 SERPL-SCNC: 21 MMOL/L (ref 18–29)
CREAT SERPL-MCNC: 1.58 MG/DL (ref 0.76–1.27)
GLUCOSE SERPL-MCNC: 157 MG/DL (ref 65–99)
POTASSIUM SERPL-SCNC: 4.6 MMOL/L (ref 3.5–5.2)
SODIUM SERPL-SCNC: 140 MMOL/L (ref 134–144)

## 2017-05-24 ENCOUNTER — OFFICE VISIT (OUTPATIENT)
Dept: CARDIOLOGY CLINIC | Age: 67
End: 2017-05-24

## 2017-05-24 VITALS
WEIGHT: 171.6 LBS | SYSTOLIC BLOOD PRESSURE: 136 MMHG | HEART RATE: 72 BPM | HEIGHT: 70 IN | DIASTOLIC BLOOD PRESSURE: 74 MMHG | OXYGEN SATURATION: 98 % | RESPIRATION RATE: 16 BRPM | BODY MASS INDEX: 24.57 KG/M2

## 2017-05-24 DIAGNOSIS — I25.5 ISCHEMIC CARDIOMYOPATHY: ICD-10-CM

## 2017-05-24 DIAGNOSIS — I48.0 PAROXYSMAL ATRIAL FIBRILLATION (HCC): Primary | ICD-10-CM

## 2017-05-24 NOTE — PROGRESS NOTES
Visit Vitals    /74 (BP 1 Location: Right arm, BP Patient Position: Sitting)    Pulse 72    Resp 16    Ht 5' 10\" (1.778 m)    Wt 171 lb 9.6 oz (77.8 kg)    SpO2 98%    BMI 24.62 kg/m2

## 2017-05-24 NOTE — PROGRESS NOTES
HISTORY OF PRESENTING ILLNESS      Fernanda Burden. is a 61 y.o. male with ICM, LVEF 25%, CKD, PAF (Hx GIB), DM, Hepatitis C, CAD s/p  PCI whose LVEF has failed to improve post-revascularization. He is currently on milrinone for NYHA class III symptoms. ACTIVE PROBLEM LIST     Patient Active Problem List    Diagnosis Date Noted    Paroxysmal atrial fibrillation ([address] 03/06/2017    H/O: GI bleed 03/06/2017    Diabetes mellitus ([address] 03/06/2017    Hepatitis C 03/06/2017    Chronic renal insufficiency 03/06/2017    Dyslipidemia 03/06/2017    Ischemic cardiomyopathy 01/20/2017           PAST MEDICAL HISTORY     Past Medical History:   Diagnosis Date    Cardiomyopathy ([address] 1/20/2017    A. Echo (1/19/17):  EF 5-10% with severe GHK,. Mildly dil LA. Mild TR. PASP 46.    Chronic renal insufficiency 3/6/2017    Diabetes mellitus ([address] 3/6/2017    Dyslipidemia 3/6/2017    A. FLP (1/19/17): Tot 120, , HDL 19, LDL 76 (no Rx).  H/O: GI bleed 3/6/2017    Hepatitis C 3/6/2017    Paroxysmal atrial fibrillation ([address] 3/6/2017           PAST SURGICAL HISTORY     Past Surgical History:   Procedure Laterality Date    COLONOSCOPY N/A 2/17/2017    COLONOSCOPY performed by Kamryn Olsen MD at [facility] ENDOSCOPY          ALLERGIES     Allergies   Allergen Reactions    Heparin (Porcine) Unknown (comments)          FAMILY HISTORY     History reviewed. No pertinent family history.  negative for cardiac disease       SOCIAL HISTORY     Social History     Social History    Marital status:      Spouse name: N/A    Number of children: N/A    Years of education: N/A     Social History Main Topics    Smoking status: Former Smoker     Packs/day: 2.00     Years: 45.00     Quit date: 1/20/2017    Smokeless tobacco: Never Used    Alcohol use No    Drug use: None    Sexual activity: Yes     Partners: Female     Other Topics Concern    None     Social History Narrative MEDICATIONS     Current Outpatient Prescriptions   Medication Sig    amiodarone (CORDARONE) 200 mg tablet Take 1 Tab by mouth every twelve (12) hours.  spironolactone (ALDACTONE) 25 mg tablet Take 1 Tab by mouth daily.  carvedilol (COREG) 3.125 mg tablet Take 1 Tab by mouth two (2) times daily (with meals).  atorvastatin (LIPITOR) 10 mg tablet Take 1 Tab by mouth daily.  clopidogrel (PLAVIX) 75 mg tab Take 1 Tab by mouth daily.  bumetanide (BUMEX) 2 mg tablet Take 0.5 Tabs by mouth daily. (Patient taking differently: Take 0.5 Tabs by mouth every other day.)    DULoxetine (CYMBALTA) 30 mg capsule Take 1 Cap by mouth daily.  sacubitril-valsartan (ENTRESTO) 24 mg/26 mg tablet Take 1 Tab by mouth two (2) times a day.  aspirin delayed-release 81 mg tablet Take 1 Tab by mouth daily.  magnesium oxide (MAG-OX) 400 mg tablet Take 1 Tab by mouth daily.  milrinone (PRIMACOR) 20 mg/100 mL (200 mcg/mL) infusion 28.425 mcg/min by IntraVENous route continuous.  insulin NPH/insulin regular (NOVOLIN 70/30, HUMULIN 70/30) 100 unit/mL (70-30) injection 10 units w/ breakfast  8 units w/ dinner    Insulin Syringes, Disposable, 1 mL syrg Pt to take 10 units w/ breakfast and 8 units w/ dinner     No current facility-administered medications for this visit. I have reviewed the nurses notes, vitals, problem list, allergy list, medical history, family, social history and medications. REVIEW OF SYMPTOMS      General: Pt denies excessive weight gain or loss. Pt is able to conduct ADL's  HEENT: Denies blurred vision, headaches, hearing loss, epistaxis and difficulty swallowing. Respiratory: Denies cough, congestion, shortness of breath, NAVARRO, wheezing or stridor.   Cardiovascular: Denies precordial pain, palpitations, edema or PND  Gastrointestinal: Denies poor appetite, indigestion, abdominal pain or blood in stool  Genitourinary: Denies hematuria, dysuria, increased urinary frequency  Musculoskeletal: Denies joint pain or swelling from muscles or joints  Neurologic: Denies tremor, paresthesias, headache, or sensory motor disturbance  Psychiatric: Denies confusion, insomnia, depression  Integumentray: Denies rash, itching or ulcers. Hematologic: Denies easy bruising, bleeding       PHYSICAL EXAMINATION      Vitals:    05/24/17 1535   BP: 136/74   Pulse: 72   Resp: 16   SpO2: 98%   Weight: 171 lb 9.6 oz (77.8 kg)   Height: 5' 10\" (1.778 m)     General: Well developed, in no acute distress. HEENT: No jaundice, oral mucosa moist, no oral ulcers  Neck: Supple, no stiffness, no lymphadenopathy, supple  Heart:  Normal S1/S2 negative S3 or S4. Regular, no murmur, gallop or rub, no jugular venous distention  Respiratory: Clear bilaterally x 4, no wheezing or rales  Abdomen:   Soft, non-tender, bowel sounds are active.   Extremities:  No edema, normal cap refill, no cyanosis. Musculoskeletal: No clubbing, no deformities  Neuro: A&Ox3, speech clear, gait stable, cooperative, no focal neurologic deficits  Skin: Skin color is normal. No rashes or lesions.  Non diaphoretic, moist.  Vascular: 2+ pulses symmetric in all extremities       DIAGNOSTIC DATA      EKG: Sinus rhythm       LABORATORY DATA      Lab Results   Component Value Date/Time    WBC 7.0 02/27/2017 01:30 PM    HGB 10.9 02/27/2017 01:30 PM    HCT 34.4 02/27/2017 01:30 PM    PLATELET 712 49/76/4624 01:30 PM    MCV 79 02/27/2017 01:30 PM      Lab Results   Component Value Date/Time    Sodium 140 05/22/2017 12:00 AM    Potassium 4.6 05/22/2017 12:00 AM    Chloride 98 05/22/2017 12:00 AM    CO2 21 05/22/2017 12:00 AM    Anion gap 9 02/22/2017 03:16 AM    Glucose 157 05/22/2017 12:00 AM    BUN 41 05/22/2017 12:00 AM    Creatinine 1.58 05/22/2017 12:00 AM    BUN/Creatinine ratio 26 05/22/2017 12:00 AM    GFR est AA 53 05/22/2017 12:00 AM    GFR est non-AA 46 05/22/2017 12:00 AM    Calcium 9.2 05/22/2017 12:00 AM    Bilirubin, total 0.5 02/22/2017 03:16 AM    AST (SGOT) 14 02/22/2017 03:16 AM    Alk. phosphatase 99 02/22/2017 03:16 AM    Protein, total 7.3 02/22/2017 03:16 AM    Albumin 2.9 02/22/2017 03:16 AM    Globulin 4.4 02/22/2017 03:16 AM    A-G Ratio 0.7 02/22/2017 03:16 AM    ALT (SGPT) 21 02/22/2017 03:16 AM           ASSESSMENT      1. Cardiomyopathy     A. Ischemic   B. NYHA class III  2. Atrial fibrillation   A. Paroxysmal  3. CAD, native  4. Percutaneous transluminal angioplasty       PLAN     Plan for implantation of a single chamber ICD for primary prevention of sudden death with [employer]. DC LifeVest post procedure. Consider anticoagulation once safe. FOLLOW-UP     1 week post procedure     Thank you, Magda Taylor MD and Dr. Ave Babinski for allowing me to participate in the care of this extraordinarily pleasant male. Please do not hesitate to contact me for further questions/concerns.          Maria Eugenia Zelaya MD  Cardiac Electrophysiology / Cardiology    [facility].  [address] [facility], [facility], [address], [facility] [address] Marti [place].    Marni Victor  [phone] / [phone] Fax   [phone] / [phone] Fax

## 2017-05-24 NOTE — MR AVS SNAPSHOT
Visit Information Date & Time Provider Department Dept. Phone Encounter #  
 5/24/2017  3:40 PM Damian Carmona MD [facility] [phone] [id number] Your Appointments 6/27/2017  2:00 PM  
Follow Up with Coral Heath MD  
[address] [facility] ([address]) [address]  
[phone] [address]  
  
    
 8/15/2017  3:00 PM  
ESTABLISHED PATIENT with Shayne Diamond MD  
[facility] ([facility] SCHEDULING) Appt Note: 5 mo fup  
 [address] [facility] [address] [facility]  
[address] [facility] [place] [id number] [address] Upcoming Health Maintenance Date Due  
 FOOT EXAM Q1 11/23/1962 MICROALBUMIN Q1 11/23/1962 EYE EXAM RETINAL OR DILATED Q1 11/23/1962 Pneumococcal 19-64 Medium Risk (1 of 1 - PPSV23) 11/23/1971 DTaP/Tdap/Td series (1 - Tdap) 11/23/1973 ZOSTER VACCINE AGE 60> 11/23/2012 HEMOGLOBIN A1C Q6M 7/19/2017 INFLUENZA AGE 9 TO ADULT 8/1/2017 LIPID PANEL Q1 1/19/2018 FOBT Q 1 YEAR AGE 50-75 2/16/2018 Allergies as of 5/24/2017  Review Complete On: 5/24/2017 By: Damian Carmona MD  
  
 Severity Noted Reaction Type Reactions Heparin (Porcine)  02/07/2017    Unknown (comments) Current Immunizations  Reviewed on 2/14/2017 No immunizations on file. Not reviewed this visit You Were Diagnosed With   
  
 Codes Comments Paroxysmal atrial fibrillation (HCC)    -  Primary ICD-10-CM: I48.0 ICD-9-CM: 427.31 Ischemic cardiomyopathy     ICD-10-CM: I25.5 ICD-9-CM: 414.8 Vitals BP Pulse Resp Height(growth percentile) Weight(growth percentile) SpO2  
 136/74 (BP 1 Location: Right arm, BP Patient Position: Sitting) 72 16 5' 10\" (1.778 m) 171 lb 9.6 oz (77.8 kg) 98% BMI Smoking Status 24.62 kg/m2 Former Smoker Vitals History BMI and BSA Data Body Mass Index Body Surface Area  
 24.62 kg/m 2 1.96 m 2 Preferred Pharmacy Pharmacy [facility] [phone] Your Updated Medication List  
  
   
This list is accurate as of: 5/24/17  3:55 PM.  Always use your most recent med list.  
  
  
  
  
 amiodarone 200 mg tablet Commonly known as:  CORDARONE Take 1 Tab by mouth every twelve (12) hours. aspirin delayed-release 81 mg tablet Take 1 Tab by mouth daily. atorvastatin 10 mg tablet Commonly known as:  LIPITOR Take 1 Tab by mouth daily. bumetanide 2 mg tablet Commonly known as:  Merribeth Schneiders Take 0.5 Tabs by mouth daily. carvedilol 3.125 mg tablet Commonly known as:  Rosina Breed Take 1 Tab by mouth two (2) times daily (with meals). clopidogrel 75 mg Tab Commonly known as:  PLAVIX Take 1 Tab by mouth daily. DULoxetine 30 mg capsule Commonly known as:  CYMBALTA Take 1 Cap by mouth daily. insulin NPH/insulin regular 100 unit/mL (70-30) injection Commonly known as:  NOVOLIN 70/30, HUMULIN 70/30  
10 units w/ breakfast 8 units w/ dinner Insulin Syringes (Disposable) 1 mL Syrg Pt to take 10 units w/ breakfast and 8 units w/ dinner  
  
 magnesium oxide 400 mg tablet Commonly known as:  MAG-OX Take 1 Tab by mouth daily. milrinone 20 mg/100 mL (200 mcg/mL) infusion Commonly known as:  PRIMACOR  
28.425 mcg/min by IntraVENous route continuous. sacubitril-valsartan 24-26 mg tablet Commonly known as:  ENTRESTO Take 1 Tab by mouth two (2) times a day. spironolactone 25 mg tablet Commonly known as:  ALDACTONE Take 1 Tab by mouth daily. We Performed the Following AMB POC EKG ROUTINE W/ 12 LEADS, INTER & REP [86160 CPT(R)] To-Do List   
 05/25/2017 To Be Determined Appointment with Helen Bernardo RN at [place] [address] Introducing [facility] [place] [facility]! [employer] introduces [employer] patient portal. Now you can access parts of your medical record, email your doctor's office, and request medication refills online. 1. In your internet browser, go to [email or web address] 2. Click on the First Time User? Click Here link in the Sign In box. You will see the New Member Sign Up page. 3. Enter your [employer] Access Code exactly as it appears below. You will not need to use this code after youve completed the sign-up process. If you do not sign up before the expiration date, you must request a new code. · [employer] Access Code: [email or web address] Expires: 7/24/2017 11:40 AM 
 
4. Enter the last four digits of your Social Security Number (xxxx) and Date of Birth (mm/dd/yyyy) as indicated and click Submit. You will be taken to the next sign-up page. 5. Create a [employer] ID. This will be your [employer] login ID and cannot be changed, so think of one that is secure and easy to remember. 6. Create a [employer] password. You can change your password at any time. 7. Enter your Password Reset Question and Answer. This can be used at a later time if you forget your password. 8. Enter your e-mail address. You will receive e-mail notification when new information is available in [address] 9. Click Sign Up. You can now view and download portions of your medical record. 10. Click the Download Summary menu link to download a portable copy of your medical information. If you have questions, please visit the Frequently Asked Questions section of the [employer] website. Remember, [employer] is NOT to be used for urgent needs. For medical emergencies, dial 911. Now available from your iPhone and Android! Please provide this summary of care documentation to your next provider. Your primary care clinician is listed as MICHAEL BALLARD. If you have any questions after today's visit, please call [phone].

## 2017-05-25 ENCOUNTER — HOME CARE VISIT (OUTPATIENT)
Dept: SCHEDULING | Facility: HOME HEALTH | Age: 67
End: 2017-05-25

## 2017-05-25 VITALS
RESPIRATION RATE: 18 BRPM | TEMPERATURE: 99 F | HEART RATE: 83 BPM | WEIGHT: 165 LBS | BODY MASS INDEX: 23.68 KG/M2 | DIASTOLIC BLOOD PRESSURE: 64 MMHG | SYSTOLIC BLOOD PRESSURE: 104 MMHG | OXYGEN SATURATION: 98 %

## 2017-05-25 PROCEDURE — G0299 HHS/HOSPICE OF RN EA 15 MIN: HCPCS

## 2017-05-30 ENCOUNTER — TELEPHONE (OUTPATIENT)
Dept: CARDIOLOGY CLINIC | Age: 67
End: 2017-05-30

## 2017-05-30 DIAGNOSIS — Z01.818 PREOP TESTING: ICD-10-CM

## 2017-05-30 DIAGNOSIS — I25.5 ISCHEMIC CARDIOMYOPATHY: Primary | ICD-10-CM

## 2017-05-31 ENCOUNTER — TELEPHONE (OUTPATIENT)
Dept: CARDIOLOGY CLINIC | Age: 67
End: 2017-05-31

## 2017-05-31 VITALS
RESPIRATION RATE: 18 BRPM | TEMPERATURE: 97.3 F | DIASTOLIC BLOOD PRESSURE: 60 MMHG | OXYGEN SATURATION: 98 % | HEART RATE: 72 BPM | SYSTOLIC BLOOD PRESSURE: 130 MMHG

## 2017-06-01 ENCOUNTER — HOME CARE VISIT (OUTPATIENT)
Dept: SCHEDULING | Facility: HOME HEALTH | Age: 67
End: 2017-06-01

## 2017-06-01 VITALS
OXYGEN SATURATION: 98 % | BODY MASS INDEX: 23.68 KG/M2 | DIASTOLIC BLOOD PRESSURE: 62 MMHG | WEIGHT: 165 LBS | SYSTOLIC BLOOD PRESSURE: 92 MMHG | TEMPERATURE: 99.3 F | HEART RATE: 80 BPM

## 2017-06-01 PROCEDURE — G0299 HHS/HOSPICE OF RN EA 15 MIN: HCPCS

## 2017-06-08 ENCOUNTER — HOME CARE VISIT (OUTPATIENT)
Dept: SCHEDULING | Facility: HOME HEALTH | Age: 67
End: 2017-06-08

## 2017-06-08 ENCOUNTER — TELEPHONE (OUTPATIENT)
Dept: CARDIOLOGY CLINIC | Age: 67
End: 2017-06-08

## 2017-06-08 PROCEDURE — G0299 HHS/HOSPICE OF RN EA 15 MIN: HCPCS

## 2017-06-08 NOTE — TELEPHONE ENCOUNTER
Camryn the [address] Nurse need a call back about a bad cough the pt is having. She can be reached at [phone].  [employer]

## 2017-06-08 NOTE — TELEPHONE ENCOUNTER
Called the patient to share  received his unpaid [employer] and reached out to Kelechi Suresh to collaborate on financial assistance. He reports [employer] contacted him directly following the 's phone call to resolve the high bill. He also reports his ICD surgery will be Monday at [facility].  informed Dr. Giuliana Panda of this information.     Ashly Lino, MSW, LCSW    Clinical    Jennifer Roman [phone]

## 2017-06-08 NOTE — TELEPHONE ENCOUNTER
Spoke with Evelio. Stated Mr. Monique Torres has been having a productive cough. Has been taking mucinex for 1 week with some improvement in symptoms. States lungs are clear and no signs of volume overload. Will have Dr. Mell Guidry review lab from 6/1. Scheduled for defibrillator implant on 6/12.

## 2017-06-09 RX ORDER — SODIUM CHLORIDE 0.9 % (FLUSH) 0.9 %
5-10 SYRINGE (ML) INJECTION EVERY 8 HOURS
Status: CANCELLED | OUTPATIENT
Start: 2017-06-09

## 2017-06-09 RX ORDER — SODIUM CHLORIDE 0.9 % (FLUSH) 0.9 %
5-10 SYRINGE (ML) INJECTION AS NEEDED
Status: CANCELLED | OUTPATIENT
Start: 2017-06-09

## 2017-06-10 VITALS
HEART RATE: 84 BPM | SYSTOLIC BLOOD PRESSURE: 138 MMHG | TEMPERATURE: 98.4 F | RESPIRATION RATE: 18 BRPM | OXYGEN SATURATION: 96 % | DIASTOLIC BLOOD PRESSURE: 68 MMHG | WEIGHT: 168 LBS | BODY MASS INDEX: 24.11 KG/M2

## 2017-06-12 ENCOUNTER — HOSPITAL ENCOUNTER (OUTPATIENT)
Dept: NON INVASIVE DIAGNOSTICS | Age: 67
Setting detail: OBSERVATION
Discharge: HOME | End: 2017-06-13
Attending: INTERNAL MEDICINE | Admitting: INTERNAL MEDICINE
Payer: SELF-PAY

## 2017-06-12 ENCOUNTER — APPOINTMENT (OUTPATIENT)
Dept: GENERAL RADIOLOGY | Age: 67
End: 2017-06-12
Attending: INTERNAL MEDICINE
Payer: SELF-PAY

## 2017-06-12 LAB
ERYTHROCYTE [DISTWIDTH] IN BLOOD BY AUTOMATED COUNT: 15.6 % (ref 11.5–14.5)
GLUCOSE BLD STRIP.AUTO-MCNC: 119 MG/DL (ref 65–100)
GLUCOSE BLD STRIP.AUTO-MCNC: 133 MG/DL (ref 65–100)
GLUCOSE BLD STRIP.AUTO-MCNC: 290 MG/DL (ref 65–100)
HCT VFR BLD AUTO: 33.2 % (ref 36.6–50.3)
HGB BLD-MCNC: 10.8 G/DL (ref 12.1–17)
MCH RBC QN AUTO: 26.6 PG (ref 26–34)
MCHC RBC AUTO-ENTMCNC: 32.5 G/DL (ref 30–36.5)
MCV RBC AUTO: 81.8 FL (ref 80–99)
PLATELET # BLD AUTO: 161 K/UL (ref 150–400)
RBC # BLD AUTO: 4.06 M/UL (ref 4.1–5.7)
SERVICE CMNT-IMP: ABNORMAL
WBC # BLD AUTO: 7.7 K/UL (ref 4.1–11.1)

## 2017-06-12 PROCEDURE — 74011000250 HC RX REV CODE- 250: Performed by: INTERNAL MEDICINE

## 2017-06-12 PROCEDURE — 71020 XR CHEST PA LAT: CPT

## 2017-06-12 PROCEDURE — C1777 LEAD, AICD, ENDO SINGLE COIL: HCPCS | Performed by: INTERNAL MEDICINE

## 2017-06-12 PROCEDURE — 77030012935 HC DRSG AQUACEL BMS -B

## 2017-06-12 PROCEDURE — 74011000272 HC RX REV CODE- 272: Performed by: INTERNAL MEDICINE

## 2017-06-12 PROCEDURE — 77030018673

## 2017-06-12 PROCEDURE — C1892 INTRO/SHEATH,FIXED,PEEL-AWAY: HCPCS

## 2017-06-12 PROCEDURE — 74011636637 HC RX REV CODE- 636/637: Performed by: INTERNAL MEDICINE

## 2017-06-12 PROCEDURE — 74011250636 HC RX REV CODE- 250/636: Performed by: INTERNAL MEDICINE

## 2017-06-12 PROCEDURE — 99153 MOD SED SAME PHYS/QHP EA: CPT | Performed by: INTERNAL MEDICINE

## 2017-06-12 PROCEDURE — 33249 INSJ/RPLCMT DEFIB W/LEAD(S): CPT

## 2017-06-12 PROCEDURE — 99152 MOD SED SAME PHYS/QHP 5/>YRS: CPT | Performed by: INTERNAL MEDICINE

## 2017-06-12 PROCEDURE — 85027 COMPLETE CBC AUTOMATED: CPT | Performed by: INTERNAL MEDICINE

## 2017-06-12 PROCEDURE — 77030018729 HC ELECTRD DEFIB PAD CARD -B

## 2017-06-12 PROCEDURE — 36415 COLL VENOUS BLD VENIPUNCTURE: CPT | Performed by: INTERNAL MEDICINE

## 2017-06-12 PROCEDURE — 74011250637 HC RX REV CODE- 250/637: Performed by: INTERNAL MEDICINE

## 2017-06-12 PROCEDURE — 77030011640 HC PAD GRND REM COVD -A

## 2017-06-12 PROCEDURE — C1722 AICD, SINGLE CHAMBER: HCPCS | Performed by: INTERNAL MEDICINE

## 2017-06-12 PROCEDURE — A4565 SLINGS: HCPCS

## 2017-06-12 PROCEDURE — 99218 HC RM OBSERVATION: CPT

## 2017-06-12 PROCEDURE — 74011000258 HC RX REV CODE- 258: Performed by: SPECIALIST

## 2017-06-12 PROCEDURE — 82962 GLUCOSE BLOOD TEST: CPT

## 2017-06-12 PROCEDURE — 77030010507 HC ADH SKN DERMBND J&J -B

## 2017-06-12 RX ORDER — LIDOCAINE HYDROCHLORIDE AND EPINEPHRINE 10; 10 MG/ML; UG/ML
20 INJECTION, SOLUTION INFILTRATION; PERINEURAL ONCE
Status: COMPLETED | OUTPATIENT
Start: 2017-06-12 | End: 2017-06-12

## 2017-06-12 RX ORDER — FENTANYL CITRATE 50 UG/ML
25-200 INJECTION, SOLUTION INTRAMUSCULAR; INTRAVENOUS
Status: DISCONTINUED | OUTPATIENT
Start: 2017-06-12 | End: 2017-06-12 | Stop reason: HOSPADM

## 2017-06-12 RX ORDER — BUPIVACAINE HYDROCHLORIDE 5 MG/ML
20 INJECTION, SOLUTION EPIDURAL; INTRACAUDAL ONCE
Status: COMPLETED | OUTPATIENT
Start: 2017-06-12 | End: 2017-06-12

## 2017-06-12 RX ORDER — LANOLIN ALCOHOL/MO/W.PET/CERES
400 CREAM (GRAM) TOPICAL DAILY
Status: DISCONTINUED | OUTPATIENT
Start: 2017-06-13 | End: 2017-06-13 | Stop reason: HOSPADM

## 2017-06-12 RX ORDER — SODIUM CHLORIDE 0.9 % (FLUSH) 0.9 %
5-10 SYRINGE (ML) INJECTION EVERY 8 HOURS
Status: DISCONTINUED | OUTPATIENT
Start: 2017-06-12 | End: 2017-06-13 | Stop reason: HOSPADM

## 2017-06-12 RX ORDER — ASPIRIN 81 MG/1
81 TABLET ORAL DAILY
Status: DISCONTINUED | OUTPATIENT
Start: 2017-06-13 | End: 2017-06-13 | Stop reason: HOSPADM

## 2017-06-12 RX ORDER — CARVEDILOL 3.12 MG/1
3.12 TABLET ORAL 2 TIMES DAILY WITH MEALS
Status: DISCONTINUED | OUTPATIENT
Start: 2017-06-12 | End: 2017-06-13 | Stop reason: HOSPADM

## 2017-06-12 RX ORDER — ACETAMINOPHEN 325 MG/1
650 TABLET ORAL
Status: DISCONTINUED | OUTPATIENT
Start: 2017-06-12 | End: 2017-06-13 | Stop reason: HOSPADM

## 2017-06-12 RX ORDER — SODIUM CHLORIDE 0.9 % (FLUSH) 0.9 %
5-10 SYRINGE (ML) INJECTION EVERY 8 HOURS
Status: DISCONTINUED | OUTPATIENT
Start: 2017-06-12 | End: 2017-06-12 | Stop reason: HOSPADM

## 2017-06-12 RX ORDER — AMIODARONE HYDROCHLORIDE 200 MG/1
200 TABLET ORAL 2 TIMES DAILY WITH MEALS
Status: DISCONTINUED | OUTPATIENT
Start: 2017-06-12 | End: 2017-06-13 | Stop reason: HOSPADM

## 2017-06-12 RX ORDER — SODIUM CHLORIDE 0.9 % (FLUSH) 0.9 %
5-10 SYRINGE (ML) INJECTION AS NEEDED
Status: DISCONTINUED | OUTPATIENT
Start: 2017-06-12 | End: 2017-06-13 | Stop reason: HOSPADM

## 2017-06-12 RX ORDER — DULOXETIN HYDROCHLORIDE 30 MG/1
30 CAPSULE, DELAYED RELEASE ORAL DAILY
Status: DISCONTINUED | OUTPATIENT
Start: 2017-06-12 | End: 2017-06-13 | Stop reason: HOSPADM

## 2017-06-12 RX ORDER — ACETAMINOPHEN 500 MG
1000 TABLET ORAL
COMMUNITY
End: 2019-01-01

## 2017-06-12 RX ORDER — SPIRONOLACTONE 25 MG/1
25 TABLET ORAL DAILY
Status: DISCONTINUED | OUTPATIENT
Start: 2017-06-12 | End: 2017-06-13 | Stop reason: HOSPADM

## 2017-06-12 RX ORDER — MILRINONE LACTATE 0.2 MG/ML
0.38 INJECTION, SOLUTION INTRAVENOUS CONTINUOUS
Status: DISPENSED | OUTPATIENT
Start: 2017-06-12 | End: 2017-06-13

## 2017-06-12 RX ORDER — HYDROCODONE BITARTRATE AND ACETAMINOPHEN 5; 325 MG/1; MG/1
1 TABLET ORAL
Status: DISCONTINUED | OUTPATIENT
Start: 2017-06-12 | End: 2017-06-13 | Stop reason: HOSPADM

## 2017-06-12 RX ORDER — BUMETANIDE 1 MG/1
1 TABLET ORAL EVERY OTHER DAY
Status: DISCONTINUED | OUTPATIENT
Start: 2017-06-13 | End: 2017-06-13 | Stop reason: HOSPADM

## 2017-06-12 RX ORDER — MILRINONE LACTATE 0.2 MG/ML
0.38 INJECTION, SOLUTION INTRAVENOUS CONTINUOUS
Status: DISCONTINUED | OUTPATIENT
Start: 2017-06-12 | End: 2017-06-12

## 2017-06-12 RX ORDER — MILRINONE LACTATE 0.2 MG/ML
0.38 INJECTION, SOLUTION INTRAVENOUS CONTINUOUS
Status: DISCONTINUED | OUTPATIENT
Start: 2017-06-12 | End: 2017-06-12 | Stop reason: CLARIF

## 2017-06-12 RX ORDER — LORATADINE 10 MG/1
10 TABLET ORAL DAILY
Status: DISCONTINUED | OUTPATIENT
Start: 2017-06-13 | End: 2017-06-13 | Stop reason: HOSPADM

## 2017-06-12 RX ORDER — ATORVASTATIN CALCIUM 10 MG/1
10 TABLET, FILM COATED ORAL DAILY
Status: DISCONTINUED | OUTPATIENT
Start: 2017-06-12 | End: 2017-06-13 | Stop reason: HOSPADM

## 2017-06-12 RX ORDER — CEFAZOLIN SODIUM IN 0.9 % NACL 2 G/50 ML
2 INTRAVENOUS SOLUTION, PIGGYBACK (ML) INTRAVENOUS ONCE
Status: COMPLETED | OUTPATIENT
Start: 2017-06-12 | End: 2017-06-12

## 2017-06-12 RX ORDER — GENTAMICIN SULFATE 80 MG/100ML
80 INJECTION, SOLUTION INTRAVENOUS ONCE
Status: COMPLETED | OUTPATIENT
Start: 2017-06-12 | End: 2017-06-12

## 2017-06-12 RX ORDER — BUMETANIDE 2 MG/1
1 TABLET ORAL EVERY OTHER DAY
COMMUNITY
End: 2017-07-07 | Stop reason: DRUGHIGH

## 2017-06-12 RX ORDER — CEFAZOLIN SODIUM IN 0.9 % NACL 2 G/50 ML
2 INTRAVENOUS SOLUTION, PIGGYBACK (ML) INTRAVENOUS EVERY 8 HOURS
Status: COMPLETED | OUTPATIENT
Start: 2017-06-12 | End: 2017-06-13

## 2017-06-12 RX ORDER — SODIUM CHLORIDE 0.9 % (FLUSH) 0.9 %
5-10 SYRINGE (ML) INJECTION AS NEEDED
Status: DISCONTINUED | OUTPATIENT
Start: 2017-06-12 | End: 2017-06-12 | Stop reason: HOSPADM

## 2017-06-12 RX ORDER — CLOPIDOGREL BISULFATE 75 MG/1
75 TABLET ORAL DAILY
Status: DISCONTINUED | OUTPATIENT
Start: 2017-06-13 | End: 2017-06-13 | Stop reason: HOSPADM

## 2017-06-12 RX ORDER — MIDAZOLAM HYDROCHLORIDE 1 MG/ML
.5-1 INJECTION, SOLUTION INTRAMUSCULAR; INTRAVENOUS
Status: DISCONTINUED | OUTPATIENT
Start: 2017-06-12 | End: 2017-06-12 | Stop reason: HOSPADM

## 2017-06-12 RX ADMIN — BUPIVACAINE HYDROCHLORIDE 100 MG: 5 INJECTION, SOLUTION EPIDURAL; INTRACAUDAL at 14:42

## 2017-06-12 RX ADMIN — FENTANYL CITRATE 25 MCG: 50 INJECTION, SOLUTION INTRAMUSCULAR; INTRAVENOUS at 14:41

## 2017-06-12 RX ADMIN — FENTANYL CITRATE 50 MCG: 50 INJECTION, SOLUTION INTRAMUSCULAR; INTRAVENOUS at 14:20

## 2017-06-12 RX ADMIN — BACITRACIN: 50000 INJECTION, POWDER, FOR SOLUTION INTRAMUSCULAR at 14:41

## 2017-06-12 RX ADMIN — MIDAZOLAM HYDROCHLORIDE 1 MG: 1 INJECTION, SOLUTION INTRAMUSCULAR; INTRAVENOUS at 14:48

## 2017-06-12 RX ADMIN — SPIRONOLACTONE 25 MG: 25 TABLET ORAL at 17:35

## 2017-06-12 RX ADMIN — Medication 10 ML: at 22:01

## 2017-06-12 RX ADMIN — CARVEDILOL 3.12 MG: 3.12 TABLET, FILM COATED ORAL at 17:33

## 2017-06-12 RX ADMIN — LIDOCAINE HYDROCHLORIDE,EPINEPHRINE BITARTRATE 200 MG: 10; .01 INJECTION, SOLUTION INFILTRATION; PERINEURAL at 14:42

## 2017-06-12 RX ADMIN — MIDAZOLAM HYDROCHLORIDE 1 MG: 1 INJECTION, SOLUTION INTRAMUSCULAR; INTRAVENOUS at 14:40

## 2017-06-12 RX ADMIN — MIDAZOLAM HYDROCHLORIDE 1 MG: 1 INJECTION, SOLUTION INTRAMUSCULAR; INTRAVENOUS at 14:35

## 2017-06-12 RX ADMIN — ATORVASTATIN CALCIUM 10 MG: 10 TABLET, FILM COATED ORAL at 17:33

## 2017-06-12 RX ADMIN — DULOXETINE HYDROCHLORIDE 30 MG: 30 CAPSULE, DELAYED RELEASE ORAL at 17:34

## 2017-06-12 RX ADMIN — HYDROCODONE BITARTRATE AND ACETAMINOPHEN 1 TABLET: 5; 325 TABLET ORAL at 17:41

## 2017-06-12 RX ADMIN — Medication 10 ML: at 15:25

## 2017-06-12 RX ADMIN — SODIUM CHLORIDE 200 ML: 900 INJECTION, SOLUTION INTRAVENOUS at 21:07

## 2017-06-12 RX ADMIN — CEFAZOLIN 2 G: 1 INJECTION, POWDER, FOR SOLUTION INTRAMUSCULAR; INTRAVENOUS; PARENTERAL at 14:14

## 2017-06-12 RX ADMIN — MIDAZOLAM HYDROCHLORIDE 1 MG: 1 INJECTION, SOLUTION INTRAMUSCULAR; INTRAVENOUS at 14:20

## 2017-06-12 RX ADMIN — MIDAZOLAM HYDROCHLORIDE 2 MG: 1 INJECTION, SOLUTION INTRAMUSCULAR; INTRAVENOUS at 14:25

## 2017-06-12 RX ADMIN — CEFAZOLIN 2 G: 1 INJECTION, POWDER, FOR SOLUTION INTRAMUSCULAR; INTRAVENOUS; PARENTERAL at 21:17

## 2017-06-12 RX ADMIN — AMIODARONE HYDROCHLORIDE 200 MG: 200 TABLET ORAL at 17:32

## 2017-06-12 RX ADMIN — MILRINONE LACTATE 0.38 MCG/KG/MIN: 200 INJECTION, SOLUTION INTRAVENOUS at 16:00

## 2017-06-12 RX ADMIN — INSULIN LISPRO 8 UNITS: 100 INJECTION, SUSPENSION SUBCUTANEOUS at 17:30

## 2017-06-12 RX ADMIN — GENTAMICIN SULFATE 80 MG: 80 INJECTION, SOLUTION INTRAVENOUS at 15:07

## 2017-06-12 RX ADMIN — SACUBITRIL AND VALSARTAN 1 TABLET: 24; 26 TABLET, FILM COATED ORAL at 17:34

## 2017-06-12 RX ADMIN — FENTANYL CITRATE 25 MCG: 50 INJECTION, SOLUTION INTRAMUSCULAR; INTRAVENOUS at 14:47

## 2017-06-12 RX ADMIN — HYDROCODONE BITARTRATE AND ACETAMINOPHEN 1 TABLET: 5; 325 TABLET ORAL at 22:00

## 2017-06-12 RX ADMIN — Medication 10 ML: at 13:05

## 2017-06-12 NOTE — IP AVS SNAPSHOT
[address] [facility] [address] [place] [address] 
[phone] Patient: Esthela Gutierrez MRN: [id number] You are allergic to the following Allergen Reactions Heparin (Porcine) Unknown (comments) Recent Documentation Height Weight BMI Smoking Status 1.778 m 77.7 kg 24.58 kg/m2 Former Smoker Emergency Contacts Name Discharge Info Relation Home Work Mobile Ofelia Vora DISCHARGE CAREGIVER [3] Spouse [3] [phone] About your hospitalization You were admitted on:  June 12, 2017 You last received care in the:  [facility] 3 PROG CARE TELE 2 You were discharged on:  June 13, 2017 Unit phone number:  [phone] Why you were hospitalized Your primary diagnosis was:  Not on File Providers Seen During Your Hospitalizations Provider Role Specialty Primary office phone Jenn Kelly MD Attending Provider Cardiology [phone] Your Primary Care Physician (PCP) Primary Care Physician Office Phone Office Fax [facility]ly [facility] [phone] Follow-up Information Follow up With Details Comments Contact Info Jenn Kelly MD On 6/19/2017 9 am  5[address] [facility] [address] [place] [address] 
[phone] Gabriela Webber MD   [id number] [place] [address] 
[phone] Your Appointments Thursday Judit 15, 2017 To Be Determined ROUTINE with Ester Varela RN  
[facility] 1740 [facility] 1400 ([address]) 1740 [facility] 1400 ([address]) Monday June 19, 2017  9:00 AM EDT  
ESTABLISHED PATIENT with Jenn Kelly MD  
[facility] ([facility]) 320 [facility] [address] 600 1007 [facility]  
[phone] Thursday June 22, 2017 To Be Determined SN RECERTIFICATION with Carissa Castro RN  
[facility] 1740 [facility],[address] ([address]) [facility] Tuesday June 27, 2017  2:00 PM EDT Follow Up with Oneil Srinivasan MD  
1229 [facility] ([facility]) [facility] 1400 [address] [place] [address]  
[phone] Current Discharge Medication List  
  
START taking these medications Dose & Instructions Dispensing Information Comments Morning Noon Evening Bedtime  
 cephALEXin 500 mg capsule Commonly known as:  Larina Grise Your last dose was: Your next dose is:    
   
   
 Dose:  500 mg Take 1 Cap by mouth three (3) times daily for 5 days. Quantity:  15 Cap Refills:  0 HYDROcodone-acetaminophen 5-325 mg per tablet Commonly known as:  Gaurav Massac Your last dose was: Your next dose is:    
   
   
 Dose:  1 Tab Take 1 Tab by mouth every six (6) hours as needed. Max Daily Amount: 4 Tabs. Quantity:  10 Tab Refills:  0 CONTINUE these medications which have NOT CHANGED Dose & Instructions Dispensing Information Comments Morning Noon Evening Bedtime  
 amiodarone 200 mg tablet Commonly known as:  CORDARONE Your last dose was: Your next dose is:    
   
   
 Dose:  200 mg Take 1 Tab by mouth every twelve (12) hours. Quantity:  60 Tab Refills:  1  
     
   
   
   
  
 aspirin delayed-release 81 mg tablet Your last dose was: Your next dose is:    
   
   
 Dose:  81 mg Take 1 Tab by mouth daily. Quantity:  30 Tab Refills:  1  
     
   
   
   
  
 atorvastatin 10 mg tablet Commonly known as:  LIPITOR Your last dose was: Your next dose is:    
   
   
 Dose:  10 mg Take 1 Tab by mouth daily. Quantity:  30 Tab Refills:  1 bumetanide 2 mg tablet Commonly known as:  Velton Sandifer Your last dose was: Your next dose is:    
   
   
 Dose:  1 mg Take 1 mg by mouth every other day. Refills:  0  
     
   
   
   
  
 carvedilol 3.125 mg tablet Commonly known as:  Alma Ly Your last dose was: Your next dose is:    
   
   
 Dose:  3.125 mg Take 1 Tab by mouth two (2) times daily (with meals). Quantity:  60 Tab Refills:  1  
     
   
   
   
  
 clopidogrel 75 mg Tab Commonly known as:  PLAVIX Your last dose was: Your next dose is:    
   
   
 Dose:  75 mg Take 1 Tab by mouth daily. Quantity:  30 Tab Refills:  1 DULoxetine 30 mg capsule Commonly known as:  CYMBALTA Your last dose was: Your next dose is:    
   
   
 Dose:  30 mg Take 1 Cap by mouth daily. Quantity:  30 Cap Refills:  5  
     
   
   
   
  
 * insulin NPH/insulin regular 100 unit/mL (70-30) injection Commonly known as:  NOVOLIN 70/30, HUMULIN 70/30 Your last dose was: Your next dose is:    
   
   
 Dose:  8 Units 8 Units by SubCUTAneous route daily (with dinner). Refills:  0  
     
   
   
   
  
 * insulin NPH/insulin regular 100 unit/mL (70-30) injection Commonly known as:  NOVOLIN 70/30, HUMULIN 70/30 Your last dose was: Your next dose is:    
   
   
 Dose:  10 Units 10 Units by SubCUTAneous route daily (with breakfast). Refills:  0 Insulin Syringes (Disposable) 1 mL Syrg Your last dose was: Your next dose is:    
   
   
 Pt to take 10 units w/ breakfast and 8 units w/ dinner Quantity:  500 Syringe Refills:  1  
     
   
   
   
  
 loratadine 10 mg tablet Commonly known as:  Chantel Saldana Your last dose was: Your next dose is:    
   
   
 Dose:  10 mg Take 10 mg by mouth daily. Refills:  0  
     
   
   
   
  
 magnesium oxide 400 mg tablet Commonly known as:  MAG-OX Your last dose was: Your next dose is:    
   
   
 Dose:  400 mg Take 1 Tab by mouth daily. Quantity:  30 Tab Refills:  1  
     
   
   
   
  
 milrinone 20 mg/100 mL (200 mcg/mL) infusion Commonly known as:  Christa Granados Your last dose was: Your next dose is:    
   
   
 Dose:  0.375 mcg/kg/min 28.425 mcg/min by IntraVENous route continuous. Quantity:  100 mL Refills:  1 MUCINEX PO Your last dose was: Your next dose is:    
   
   
 Dose:  600 mg Take 600 mg by mouth daily as needed. Refills:  0  
     
   
   
   
  
 sacubitril-valsartan 24-26 mg tablet Commonly known as:  ENTRESTO Your last dose was: Your next dose is:    
   
   
 Dose:  1 Tab Take 1 Tab by mouth two (2) times a day. Quantity:  60 Tab Refills:  3  
     
   
   
   
  
 spironolactone 25 mg tablet Commonly known as:  ALDACTONE Your last dose was: Your next dose is:    
   
   
 Dose:  25 mg Take 1 Tab by mouth daily. Quantity:  30 Tab Refills:  1 TYLENOL EXTRA STRENGTH 500 mg tablet Generic drug:  acetaminophen Your last dose was: Your next dose is:    
   
   
 Dose:  1000 mg Take 1,000 mg by mouth every six (6) hours as needed for Pain. Indications: BACK PAIN Refills:  0  
     
   
   
   
  
 * Notice: This list has 2 medication(s) that are the same as other medications prescribed for you. Read the directions carefully, and ask your doctor or other care provider to review them with you. Where to Get Your Medications These medications were sent to [address] [address] Phone:  [phone]  
  cephALEXin 500 mg capsule Information on where to get these meds will be given to you by the nurse or doctor. ! Ask your nurse or doctor about these medications HYDROcodone-acetaminophen 5-325 mg per tablet Discharge Instructions ICD Discharge Instructions Please make sure you have received your Temporary ICD identification card with your discharge instructions MEDICATIONS ? Take only the medications prescribed to you at discharge. ACTIVITY ? Return to your normal activity, except as noted below. o Do not lift anything heavier than 10 pounds for 4 weeks with the affected arm. This is how long it takes the muscles to heal, and the leads inside your heart to stabilize their position. o Do not reach above your head with the affected arm for 4 weeks, doing so increases the risk of lead dislodgement. o It is, however, important to move the affected arm to prevent shoulder stiffness and locking. o Avoid tight clothes or unnecessary pressure over your incision (such as bra straps or seat belts). If it is tender or sensitive to clothing, cover the incision with a soft dressing or pad. 
o Questions about driving are individualized and should be discussed with one of the EP Physicians prior to discharge. SHOWERING  
  
 
? Leave the bandage over your incision for 7 days after the ICD implant. You bandage will be removed in clinic in 7 days. ? It is important to keep the bandaged area clean and dry. You may shower around the site until the bandage is removed in clinic. Thereafter, you may shower after the bandage is removed, washing it gently with soap and water. Do not apply any lotions, powders, or perfumes to the incision line. ? Avoid submerging your incision in water (tub baths, hot tubs, or swimming) for four weeks. ? Underneath the dressing. o If you have white steri-strips over your incision (underneath the gauze dressing), they will curl up at the end and fall off, usually within 10 days.   Do not pull them off. 
- OR -  
 o You may have a different type of closure for the incision. If Dermabond Adhesive was used to close your incision, you will receive a separate instruction sheet. DISCHARGE PRECAUTIONS ? Record your temperature every day, at the same time, for 3 weeks after your implant. A temperature of 100.5 F, or higher, can be the first sign of infection. This should be reported to your Doctor immediately. ? You cannot have an MRI. You must be aware that any strong magnet or magnetic field can affect your ICD. In general, be careful of metal detectors, heavy machinery, and any area where arc-welding is performed. Avoid metal detectors such as the ones in security checkpoints at [facility] or [address] [place] [address]. When approaching a security checkpoint show your ICD Identification Card to security personnel and ask to be hand searched. ? Always tell your doctor or dentist that you have an ICD. Antibiotics may be prescribed before certain procedures. ? If you use a cell phone, hold it on the opposite side from where your ICD is implanted. ? Your temporary identification will be given to you with these instructions. Keep your ICD card in your wallet or on your person at all times. You should receive your permanent card in 8 weeks. If you do not receive your permanent card, please call the office at [phone]. TAKING YOUR PULSE ? Take your pulse the same time every day, preferably in the morning. ? Sit down and rest for 5 minutes prior to taking your pulse. ? Take your pulse for 1 full minute, use a clock or stop watch with a second hand. ? To feel your pulse, use the first two fingers of one hand; place them on the thumb side of the wrist of the opposite hand. The pulse will be steady, regular and throbbing. ? Call the office if your pulse is less than 40 beats per minute. SYMPTOMS THAT NEED TO BE REPORTED IMMEDIATELY ? Temperature more than 100.4 F ? Redness or warmth at the incision site, or pain for longer than the first 5 days after the implant. ? Drainage from the incision site. ? Swelling around the incision site. ? Shortness of breath. ? Rapid heart rate or palpitations. ? Dizziness, lightheadedness, fainting. ? Slow pulse below 40 beats per minute. ? REMEMBER: If you feel something is an emergency or cannot be handled over the phone, call 911 or go to the closest emergency room. WHAT TO DO IF YOUR ICD DELIVERS A SHOCK · If you ICD delivers a shock, you should seek attention at the nearest emergency room, call our office or call 911. Eusebio Rowe MD 
Cardiac Electrophysiology / Cardiology 9 [facility] 
15[facility], [address] [place] [address], [address]         14[address] [facility] 
[phone] / [phone] Fax       [phone] / [phone] Fax Implantable Cardioverter-Defibrillator Placement: What to Expect at Home Your Recovery Implantable cardioverter-defibrillator (ICD) placement is surgery to put an ICD in your chest. An ICD is a small, battery-powered device that fixes life-threatening changes in your heartbeat. If the ICD detects a life-threatening rapid heart rhythm, it tries to slow the rhythm to get it back to normal. If the dangerous rhythm does not stop, the ICD sends an electric shock to the heart to restore a normal rhythm. The device then goes back to its watchful mode. Your chest may be sore where the doctor made the cut (incision) and put in the ICD. You also may have a bruise and mild swelling. These symptoms usually get better in 1 to 2 weeks. You may feel a hard ridge along the incision. This usually gets softer in the months after surgery. You probably will be able to see and feel the outline of the ICD under your skin. You will probably be able to go back to work or your usual routine 1 to 2 weeks after surgery. Your doctor will talk to you about how often you will need to have the ICD checked. When you have an ICD, it is important to avoid electrical devices that can stop your ICD from working right. Check with your doctor about what you need to stay away from, what you need to use with care, and what is okay to use. You will need to stay away from things with strong magnetic and electrical fields such as MRI machines, welding equipment, and power generators. You can use a cell phone, but keep it at least 6 inches away from your ICD. You can safely use most household and office electronics such as kitchen appliances, electric power tools, and computers. This care sheet gives you a general idea about how long it will take for you to recover. But each person recovers at a different pace. Follow the steps below to get better as quickly as possible. How can you care for yourself at home? Activity · Rest when you feel tired. Getting enough sleep will help you recover. · Try to walk each day. Start by walking a little more than you did the day before. Bit by bit, increase the amount you walk. Walking boosts blood flow and helps prevent pneumonia and constipation. · For 4 to 6 weeks: ¨ Avoid activities that strain your chest or upper arm muscles. This includes pushing a  or vacuum, mopping floors, swimming, or swinging a golf club or tennis racquet. ¨ Do not raise your arm, on the side of your body where the ICD is located, above shoulder level. ¨ Avoid strenuous activities, such as bicycle riding, jogging, weight lifting, or heavy aerobic exercise. ¨ Avoid lifting anything that would make you strain. This may include heavy grocery bags and milk containers, a heavy briefcase or backpack, cat litter or dog food bags, or a child. · Ask your doctor when you can drive again. · You will probably need to take about 1 to 2 weeks off from work. It depends on the type of work you do and how you feel. · Ask your doctor when it is okay for you to have sex. Diet · You can eat your normal diet. If your stomach is upset, try bland, low-fat foods like plain rice, broiled chicken, toast, and yogurt. · Drink enough fluids to stay well-hydrated, while avoiding fluid overload. Medicines · Your doctor will tell you if and when you can restart your medicines. He or she will also give you instructions about taking any new medicines. · If you take blood thinners, such as warfarin (Coumadin), prasurgel (Effient), or aspirin, be sure to talk to your doctor. He or she will tell you if and when to start taking those medicines again. Make sure that you understand exactly what your doctor wants you to do. · Take pain medicines exactly as directed. ¨ If the doctor gave you a prescription medicine for pain, take it as prescribed. ¨ If you are not taking a prescription pain medicine, ask your doctor if you can take an over-the-counter medicine. ¨ Do not take aspirin, ibuprofen (Advil, Motrin), naproxen (Aleve), or other nonsteroidal anti-inflammatory drugs (NSAIDs) unless your doctor says it is okay. · If you think your pain medicine is making you sick to your stomach: 
¨ Take your medicine after meals (unless your doctor has told you not to). ¨ Ask your doctor for a different pain medicine. · Be sure to take your prescribed antibiotic as directed. Do not stop taking them just because you feel better. You need to take the full course of antibiotics. Incision care · Keep the area clean and dry. Do not take a bath or go in a swimming pool, until approved by your cardiologist.  
· You may take a shower, but be careful to keep the spray to your back. Do not soak the dressing. · Your dressing will be removed at your next cardiology appointment. · Once the dressing is removed, following instructions for incision care. Exercise · Check with your doctor before you start an exercise program. Your doctor may recommend that you work with a physical therapist to learn how to exercise safely with an ICD. Other instructions · Carry your ICD identification card with you at all times. The card should include the ICD  and model information. · Wear medical alert jewelry that states you have an ICD. You can buy this at most drugstores. · Have your ICD checked as often as your doctor recommends. In some cases, this may be done over the phone or the Internet. Your doctor will give you instructions about how to do this. · Talk with your doctor about the possibility of turning off the ICD at the end of life. You can put your wishes about the ICD in an advance directive. Follow-up care is a key part of your treatment and safety. Be sure to make and go to all appointments, and call your doctor if you are having problems. It's also a good idea to know your test results and keep a list of the medicines you take. When should you call for help? Call 911 anytime you think you may need emergency care. For example, call if: 
· You passed out (lost consciousness). · You have severe trouble breathing. · You have sudden chest pain and shortness of breath, or you cough up blood. · You receive more than 1 shock from your ICD. · You have symptoms of a heart attack. These may include: ¨ Chest pain or pressure, or a strange feeling in the chest. 
¨ Sweating. ¨ Shortness of breath. ¨ Nausea or vomiting. ¨ Pain, pressure, or a strange feeling in the back, neck, jaw, or upper belly or in one or both shoulders or arms. ¨ Lightheadedness or sudden weakness. ¨ A fast or irregular heartbeat. After you call 911, the  may tell you to chew 1 adult-strength or 2 to 4 low-dose aspirin. Wait for an ambulance. Do not try to drive yourself. Call your doctor now or seek immediate medical care if: 
· You receive 1 shock from your ICD. · Your heartbeat feels very fast or slow, skips beats, or flutters. · You are dizzy or lightheaded, or you feel like you may faint. · You have new or increased shortness of breath. · You have pain that does not get better after you take pain medicine. · You have loose stitches, or your incision comes open. · Bright red blood has soaked through the bandage over your incision. · You have signs of infection, such as: 
¨ Increased pain, swelling, warmth, or redness. ¨ Red streaks leading from the incision. ¨ Pus draining from the incision. ¨ A fever. · You have signs of a blood clot, such as: 
¨ Pain in your calf, back of the knee, thigh, or groin. ¨ Redness and swelling in your leg or groin. Watch closely for changes in your health, and be sure to contact your doctor if: 
· You have any problems with your ICD. Where can you learn more? Go to http://erikFluoresentricdianna.info/ Enter K184 in the search box to learn more about \"Implantable Cardioverter-Defibrillator Placement: What to Expect at Home. \" 
© 5864-8636 [employer]. Care instructions adapted under license by [employer] (which disclaims liability or warranty for this information). This care instruction is for use with your licensed healthcare professional. If you have questions about a medical condition or this instruction, always ask your healthcare professional. [address] [address] any warranty or liability for your use of this information. Content Version: 16.2.659406; Current as of: January 27, 2016 (modified 10/25/16). Avoiding Triggers With Heart Failure: Care Instructions Your Care Instructions Triggers are anything that make your heart failure flare up. A flare-up is also called \"sudden heart failure\" or \"acute heart failure. \" When you have a flare-up, fluid builds up in your lungs, and you have problems breathing. You might need to go to the hospital. By watching for changes in your condition and avoiding triggers, you can prevent heart failure flare-ups. Follow-up care is a key part of your treatment and safety. Be sure to make and go to all appointments, and call your doctor if you are having problems. It's also a good idea to know your test results and keep a list of the medicines you take. How can you care for yourself at home? Watch for changes in your weight and condition · Weigh yourself without clothing at the same time each day. Record your weight. Call your doctor if you gain 3 pounds or more in 24 hrs or 5 pounds in one week. A sudden weight gain may mean that your heart failure is getting worse. · Keep a daily record of your symptoms. Write down any changes in how you feel, such as new shortness of breath, cough, or problems eating. Also record if your ankles are more swollen than usual and if you have to urinate in the night more often. Note anything that you ate or did that could have triggered these changes. Limit sodium Sodium causes your body to hold on to water, making it harder for your heart to pump. People get most of their sodium from processed foods. Fast food and restaurant meals also tend to be very high in sodium. · Your doctor may suggest that you limit sodium to 1,500 milligrams (mg) a day. That is less than 1 teaspoon of salt a day, including all the salt you eat in cooking or in packaged foods. · Read food labels on cans and food packages. They tell you how much sodium you get in one serving. Check the serving size. If you eat more than one serving, you are getting more sodium. · Be aware that sodium can come in forms other than salt, including monosodium glutamate (MSG), sodium citrate, and sodium bicarbonate (baking soda). MSG is often added to Asian food. You can sometimes ask for food without MSG or salt. · Slowly reducing salt will help you adjust to the taste. Take the salt shaker off the table. · Flavor your food with garlic, lemon juice, onion, vinegar, herbs, and spices instead of salt. Do not use soy sauce, steak sauce, onion salt, garlic salt, mustard, or ketchup on your food, unless it is labeled \"low-sodium\" or \"low-salt. \" 
· Make your own salad dressings, sauces, and ketchup without adding salt. · Use fresh or frozen ingredients, instead of canned ones, whenever you can. Choose low-sodium canned goods. · Eat less processed food and food from restaurants, including fast food. Exercise as directed Moderate, regular exercise is very good for your heart. It improves your blood flow and helps control your weight. But too much exercise can stress your heart and cause a heart failure flare-up. · Check with your doctor before you start an exercise program. 
· Walking is an easy way to get exercise. Start out slowly. Gradually increase the length and pace of your walk. Swimming, riding a bike, and using a treadmill are also good forms of exercise. · When you exercise, watch for signs that your heart is working too hard. You are pushing yourself too hard if you cannot talk while you are exercising. If you become short of breath or dizzy or have chest pain, stop, sit down, and rest. 
· Do not exercise when you do not feel well. Take medicines correctly · Take your medicines exactly as prescribed. Call your doctor if you think you are having a problem with your medicine. · Make a list of all the medicines you take. Include those prescribed to you by other doctors and any over-the-counter medicines, vitamins, or supplements you take. Take this list with you when you go to any doctor. · Take your medicines at the same time every day. It may help you to post a list of all the medicines you take every day and what time of day you take them. · Make taking your medicine as simple as you can. Plan times to take your medicines when you are doing other things, such as eating a meal or getting ready for bed. This will make it easier to remember to take your medicines. · Get organized. Use helpful tools, such as daily or weekly pill containers. When should you call for help? Call 911 if you have symptoms of sudden heart failure such as: 
· You have severe trouble breathing. · You cough up pink, foamy mucus. · You have a new irregular or rapid heartbeat. Call your doctor now or seek immediate medical care if: 
· You have new or increased shortness of breath. · You are dizzy or lightheaded, or you feel like you may faint. · You have sudden weight gain, such as 3 pounds in 24 hours, or 5 pounds in one week. · You have increased swelling in your legs, ankles, or feet. · You are suddenly so tired or weak that you cannot do your usual activities. Watch closely for changes in your health, and be sure to contact your doctor if you develop new symptoms. Where can you learn more? Go to http://erikFishBraindianna.info/ Enter G515 in the search box to learn more about \"Avoiding Triggers With Heart Failure: Care Instructions. \" 
© 1230-9630 Healthwise, Incorporated. Care instructions adapted under license by [employer] (which disclaims liability or warranty for this information). This care instruction is for use with your licensed healthcare professional. If you have questions about a medical condition or this instruction, always ask your healthcare professional. [place] [address] any warranty or liability for your use of this information. Content Version: 56.9.808762; Current as of: January 27, 2016 (modified 10/10/16). Discharge Instructions Attachments/References MEFS - CEPHALEXIN (BIO-CEF, KEFLEX) - (BY MOUTH) (ENGLISH) MEFS - HYDROCODONE/ACETAMINOPHEN (VICODIN, Gillie Breed, LORTAB) - (BY MOUTH) (ENGLISH) Discharge Orders None Introducing [facility]! [employer] introduces [employer]Ov[employer] patient portal. Now you can access parts of your medical record, email your doctor's office, and request medication refills online. 1. In your internet browser, go to [email or web address]://[employer]. [employer] 2. Click on the First Time User? Click Here link in the Sign In box. You will see the New Member Sign Up page. 3. Enter your TriOviz Access Code exactly as it appears below. You will not need to use this code after youve completed the sign-up process. If you do not sign up before the expiration date, you must request a new code. · TriOviz Access Code: [email or web address] Expires: 7/24/2017 11:40 AM 
 
4. Enter the last four digits of your Social Security Number (xxxx) and Date of Birth (mm/dd/yyyy) as indicated and click Submit. You will be taken to the next sign-up page. 5. Create a TriOviz ID. This will be your TriOviz login ID and cannot be changed, so think of one that is secure and easy to remember. 6. Create a TriOviz password. You can change your password at any time. 7. Enter your Password Reset Question and Answer. This can be used at a later time if you forget your password. 8. Enter your e-mail address. You will receive e-mail notification when new information is available in [address] 9. Click Sign Up. You can now view and download portions of your medical record. 10. Click the Download Summary menu link to download a portable copy of your medical information. If you have questions, please visit the Frequently Asked Questions section of the TriOviz website. Remember, [employer]Oviz is NOT to be used for urgent needs. For medical emergencies, dial 911. Now available from your iPhone and Android! General Information Please provide this summary of care documentation to your next provider. Patient Signature:  ____________________________________________________________ Date:  ____________________________________________________________  
  
Michelle Logan Provider Signature:  ____________________________________________________________ Date:  ____________________________________________________________ More Information Cephalexin (Bio-Cef, Keflex) - (By mouth) Why this medicine is used:  
Treats infections. Contact a nurse or doctor right away if you have: · Blistering, peeling, or red skin rash · Severe or bloody diarrhea Common side effects: · Mild diarrhea or nausea © 2017 [address] Information is for End User's use only and may not be sold, redistributed or otherwise used for commercial purposes. Hydrocodone/Acetaminophen (Vicodin, Norco, Lortab) - (By mouth) Why this medicine is used:  
Treats pain. Contact a nurse or doctor right away if you have: · Blistering, peeling, red skin rash · Fast or slow heartbeat, shallow breathing, blue lips, fingernails, or skin · Anxiety, restlessness, muscle spasms, twitching, seeing or hearing things that are not there · Dark urine or pale stools, yellow skin or eyes · Extreme weakness, sweating, seizures, cold or clammy skin · Lightheadedness, dizziness, fainting, fever, sweating Common side effects: 
· Constipation, nausea, vomiting, loss of appetite, stomach pain · Tiredness or sleepiness © 2017 [address] Information is for End User's use only and may not be sold, redistributed or otherwise used for commercial purposes.

## 2017-06-12 NOTE — CARDIO/PULMONARY
Cardiac Rehab: Received consult for cardiac teaching on post-ICD instructions. S/P ICD, by Dr Robinson Jeong (6/12/17). Patient is familiar to Cardiac Rehab RN from prolonged admission with acute CHF & severe CAD (1/20/17), and subsequent transfer to [facility]. S/P PCI with stents to LM & RCA (2/9/17). LVEF 25%. Cardiologist is Dr Miriam Reed. Pt is followed closely at the [facility] clinic. Currently on milrinone gtt for NYHA class III symptoms. Sleeps in recliner. Hx also includes PAF; no anticoag due to GIB. Former smoker (quit 1/20/17). Patient is off floor. Will follow. Cardiac Meds:  ACE/ARB - Entresto  BB - carvedilol  Statin - atorvastatin  ASA - 81 mg  Prior to admission meds also included: Plavix, amiodarone, spironolactone, Bumex, and mag oxide.

## 2017-06-12 NOTE — PROGRESS NOTES
Bedside shift change report given to Luisa RN (oncoming nurse) by Aurelio Dumont RN (offgoing nurse). Report included the following information SBAR, Kardex, Intake/Output, MAR and Recent Results.

## 2017-06-12 NOTE — PROGRESS NOTES
TRANSFER - OUT REPORT:    Verbal report given to Santiago RN (name) on Aravind Hebert.  being transferred to EP Lab (unit) for ordered procedure       Report consisted of patients Situation, Background, Assessment and   Recommendations(SBAR). Information from the following report(s) SBAR was reviewed with the receiving nurse. Lines:   PICC Triple Lumen 01/25/17 Left;Brachial (Active)   Central Line Being Utilized Yes 6/12/2017  1:31 PM   Dressing Type Transparent 6/12/2017  1:31 PM   Positive Blood Return (Site #1) Yes 6/12/2017  1:31 PM   Hub Color/Line Status Red 6/12/2017  1:31 PM        Opportunity for questions and clarification was provided. Patient transported with:   Registered Nurse X 2    FSBS prior to procedure 133.

## 2017-06-12 NOTE — PROGRESS NOTES
Patient received ambulatory from waiting area, verbalizes understanding for scheduled procedure, questions answered, and consent signed. Pt presents with a Life Vest on as well as a home infusion pump for Milrinone with the Milrinone infusing. Infusion interrupted for getting changed into the hospital gown and for ordered lab draw. 1 Dr. Morales Gamma in to see the pt, discussed the procedure, answered his questions, and signed the consent.

## 2017-06-12 NOTE — PROGRESS NOTES
TRANSFER - IN REPORT:    Verbal report received from Jesus THOMAS on [address].  being received from EP Lab for ordered procedure      Report consisted of patients Situation, Background, Assessment and   Recommendations(SBAR). Information from the following report(s) Procedure Summary and MAR was reviewed with the receiving nurse. Opportunity for questions and clarification was provided. Assessment completed upon patients arrival to unit and care assumed. Swelling noted around surgical/implant site. Ice pack and sling applied. 1600 Spouse in to sit with pt.   1630 Received meal tray and consumed all. 1730 Ice pack refreshed, swelling improved. 1800 TRANSFER - OUT REPORT:    Verbal report given to [employer] (name) on [address].  being transferred to [facility] (unit) for routine progression of care       Report consisted of patients Situation, Background, Assessment and   Recommendations(SBAR). Information from the following report(s) Procedure Summary, MAR and Cardiac Rhythm NSR was reviewed with the receiving nurse. Lines:   PICC Triple Lumen 01/25/17 Left;Brachial (Active)   Central Line Being Utilized Yes 6/12/2017  1:31 PM   Dressing Type Transparent 6/12/2017  1:31 PM   Positive Blood Return (Site #1) Yes 6/12/2017  1:31 PM   Hub Color/Line Status Red 6/12/2017  1:31 PM       Peripheral IV 06/12/17 Left Forearm (Active)   Site Assessment Clean, dry, & intact 6/12/2017  2:00 PM   Phlebitis Assessment 0 6/12/2017  2:00 PM   Infiltration Assessment 0 6/12/2017  2:00 PM   Dressing Status New 6/12/2017  2:00 PM   Dressing Type Tape;Transparent 6/12/2017  2:00 PM   Hub Color/Line Status Blue 6/12/2017  2:00 PM   Action Taken Open ports on tubing capped 6/12/2017  2:00 PM   Alcohol Cap Used Yes 6/12/2017  2:00 PM        Opportunity for questions and clarification was provided. Patient transported with:   Monitor  Registered Nurse X 2 @ [phone].

## 2017-06-12 NOTE — H&P
HISTORY OF PRESENTING ILLNESS       Fernanda Phillips is a 61 y.o. male with ICM, LVEF 25%, CKD, PAF (Hx GIB), DM, Hepatitis C, CAD s/p  PCI whose LVEF has failed to improve post-revascularization. He is currently on milrinone for NYHA class III symptoms.         ACTIVE PROBLEM LIST           Patient Active Problem List     Diagnosis Date Noted    Paroxysmal atrial fibrillation ([address] 03/06/2017    H/O: GI bleed 03/06/2017    Diabetes mellitus ([address] 03/06/2017    Hepatitis C 03/06/2017    Chronic renal insufficiency 03/06/2017    Dyslipidemia 03/06/2017    Ischemic cardiomyopathy 01/20/2017            PAST MEDICAL HISTORY           Past Medical History:   Diagnosis Date    Cardiomyopathy ([address] 1/20/2017     A. Echo (1/19/17): EF 5-10% with severe GHK,. Mildly dil LA. Mild TR. PASP 46.    Chronic renal insufficiency 3/6/2017    Diabetes mellitus ([address] 3/6/2017    Dyslipidemia 3/6/2017     A. FLP (1/19/17): Tot 120, , HDL 19, LDL 76 (no Rx).  H/O: GI bleed 3/6/2017    Hepatitis C 3/6/2017    Paroxysmal atrial fibrillation ([address] 3/6/2017            PAST SURGICAL HISTORY            Past Surgical History:   Procedure Laterality Date    COLONOSCOPY N/A 2/17/2017     COLONOSCOPY performed by Alesia Richardson. Marcel oH MD at [facility] ENDOSCOPY            ALLERGIES           Allergies   Allergen Reactions    Heparin (Porcine) Unknown (comments)            FAMILY HISTORY      History reviewed. No pertinent family history.  negative for cardiac disease        SOCIAL HISTORY       Social History                Social History    Marital status:        Spouse name: N/A    Number of children: N/A    Years of education: N/A            Social History Main Topics    Smoking status: Former Smoker       Packs/day: 2.00       Years: 45.00       Quit date: 1/20/2017    Smokeless tobacco: Never Used    Alcohol use No    Drug use: None    Sexual activity: Yes       Partners: Female           Other Topics Concern    None      Social History Narrative               MEDICATIONS           Current Outpatient Prescriptions   Medication Sig    amiodarone (CORDARONE) 200 mg tablet Take 1 Tab by mouth every twelve (12) hours.  spironolactone (ALDACTONE) 25 mg tablet Take 1 Tab by mouth daily.  carvedilol (COREG) 3.125 mg tablet Take 1 Tab by mouth two (2) times daily (with meals).  atorvastatin (LIPITOR) 10 mg tablet Take 1 Tab by mouth daily.  clopidogrel (PLAVIX) 75 mg tab Take 1 Tab by mouth daily.  bumetanide (BUMEX) 2 mg tablet Take 0.5 Tabs by mouth daily. (Patient taking differently: Take 0.5 Tabs by mouth every other day.)    DULoxetine (CYMBALTA) 30 mg capsule Take 1 Cap by mouth daily.  sacubitril-valsartan (ENTRESTO) 24 mg/26 mg tablet Take 1 Tab by mouth two (2) times a day.  aspirin delayed-release 81 mg tablet Take 1 Tab by mouth daily.  magnesium oxide (MAG-OX) 400 mg tablet Take 1 Tab by mouth daily.  milrinone (PRIMACOR) 20 mg/100 mL (200 mcg/mL) infusion 28.425 mcg/min by IntraVENous route continuous.  insulin NPH/insulin regular (NOVOLIN 70/30, HUMULIN 70/30) 100 unit/mL (70-30) injection 10 units w/ breakfast  8 units w/ dinner    Insulin Syringes, Disposable, 1 mL syrg Pt to take 10 units w/ breakfast and 8 units w/ dinner      No current facility-administered medications for this visit.          I have reviewed the nurses notes, vitals, problem list, allergy list, medical history, family, social history and medications.        REVIEW OF SYMPTOMS       General: Pt denies excessive weight gain or loss. Pt is able to conduct ADL's  HEENT: Denies blurred vision, headaches, hearing loss, epistaxis and difficulty swallowing. Respiratory: Denies cough, congestion, shortness of breath, NAVARRO, wheezing or stridor.   Cardiovascular: Denies precordial pain, palpitations, edema or PND  Gastrointestinal: Denies poor appetite, indigestion, abdominal pain or blood in stool  Genitourinary: Denies hematuria, dysuria, increased urinary frequency  Musculoskeletal: Denies joint pain or swelling from muscles or joints  Neurologic: Denies tremor, paresthesias, headache, or sensory motor disturbance  Psychiatric: Denies confusion, insomnia, depression  Integumentray: Denies rash, itching or ulcers. Hematologic: Denies easy bruising, bleeding        PHYSICAL EXAMINATION           Vitals:     05/24/17 1535   BP: 136/74   Pulse: 72   Resp: 16   SpO2: 98%   Weight: 171 lb 9.6 oz (77.8 kg)   Height: 5' 10\" (1.778 m)      General: Well developed, in no acute distress. HEENT: No jaundice, oral mucosa moist, no oral ulcers  Neck: Supple, no stiffness, no lymphadenopathy, supple  Heart:  Normal S1/S2 negative S3 or S4. Regular, no murmur, gallop or rub, no jugular venous distention  Respiratory: Clear bilaterally x 4, no wheezing or rales  Abdomen:   Soft, non-tender, bowel sounds are active.   Extremities: No edema, normal cap refill, no cyanosis. Musculoskeletal: No clubbing, no deformities  Neuro: A&Ox3, speech clear, gait stable, cooperative, no focal neurologic deficits  Skin: Skin color is normal. No rashes or lesions.  Non diaphoretic, moist.  Vascular: 2+ pulses symmetric in all extremities        DIAGNOSTIC DATA       EKG: Sinus rhythm        LABORATORY DATA             Lab Results   Component Value Date/Time     WBC 7.0 02/27/2017 01:30 PM     HGB 10.9 02/27/2017 01:30 PM     HCT 34.4 02/27/2017 01:30 PM     PLATELET 320 98/63/7564 01:30 PM     MCV 79 02/27/2017 01:30 PM            Lab Results   Component Value Date/Time     Sodium 140 05/22/2017 12:00 AM     Potassium 4.6 05/22/2017 12:00 AM     Chloride 98 05/22/2017 12:00 AM     CO2 21 05/22/2017 12:00 AM     Anion gap 9 02/22/2017 03:16 AM     Glucose 157 05/22/2017 12:00 AM     BUN 41 05/22/2017 12:00 AM     Creatinine 1.58 05/22/2017 12:00 AM     BUN/Creatinine ratio 26 05/22/2017 12:00 AM     GFR est AA 53 05/22/2017 12:00 AM   GFR est non-AA 46 05/22/2017 12:00 AM     Calcium 9.2 05/22/2017 12:00 AM     Bilirubin, total 0.5 02/22/2017 03:16 AM     AST (SGOT) 14 02/22/2017 03:16 AM     Alk. phosphatase 99 02/22/2017 03:16 AM     Protein, total 7.3 02/22/2017 03:16 AM     Albumin 2.9 02/22/2017 03:16 AM     Globulin 4.4 02/22/2017 03:16 AM     A-G Ratio 0.7 02/22/2017 03:16 AM     ALT (SGPT) 21 02/22/2017 03:16 AM            ASSESSMENT       1. Cardiomyopathy    A. Ischemic  B. NYHA class III  2. Atrial fibrillation  A. Paroxysmal  3. CAD, native  4. Percutaneous transluminal angioplasty        PLAN      Plan for implantation of a single chamber ICD for primary prevention of sudden death with [employer]. DC LifeVest post procedure. Consider anticoagulation once safe.         FOLLOW-UP      1 week post procedure      Thank you, Dahval Siegel MD and Dr. Drake Weiss for allowing me to participate in the care of this extraordinarily pleasant male. Please do not hesitate to contact me for further questions/concerns.            Fausto Hernandez MD  Cardiac Electrophysiology / Cardiology     [facility].  [address] [facility], [facility], [address] [place] [address], [address] [place] [address]  [phone] / [phone] Fax   [phone] / [phone] Fax           No changes in H/P. Proceed with ICD.     Fausto Hernandez MD

## 2017-06-12 NOTE — PROGRESS NOTES
Consult for pharmacist to continue patients home milrinone infusion following ICD placement  Date: 6/12/2017  Physician: Dr Momo Butts home pump with RN  Concentration of Milrinone bag = 0.4 mg/ml  Dose  = 0.375 mcg/kg/min  Rate of home milrinone = 4.3 ml/hr    In the hospital - we will use a standard concentration premix bag per hospital policy  Concentration = 0.2 mg/ml  Dose = 0.375 mcg/kg/min  Rate of Milrinone in the hospital = 8.7 ml/hr    Note that the dose is exactly the same    Thank you  Amparo Guerrero, PharmD  [phone]

## 2017-06-12 NOTE — PROGRESS NOTES
SHIFT CHANGE:  1930 Bedside and Verbal shift change report given to Luisa THOMAS (oncoming nurse) by Sherice Garrison (offgoing nurse). Report included the following information SBAR, Kardex, MAR and Recent Results. SHIFT SUMMARY:  2000 called Dr Chan Barros about patients BP in 06'A systolic x3. Order given for 200cc bolus. 2300 fresh ice pack applied to incision site  0200  Patient given norco for pain, reporting 4/10 on numeric pain score. 0231  No labs ordered for this am.    END OF SHIFT REPORT:  0730  Bedside and Verbal shift change report given to Dale High (oncoming nurse) by Dragan Han (offgoing nurse). Report included the following information SBAR, Kardex, MAR and Recent Results.

## 2017-06-12 NOTE — PROCEDURES
Cardiac Electrophysiology Report        PATIENT INFORMATION     Patient Name: Alem Mauro. MRN: [id number]     Study Date: 2017    YOB: 1950        Age: 61 y.o. Gender: male      Procedure:  ICD Implantation    Referring Physician:  Antonia Cadena MD and Dr. Bryanna Arzola     Duty Name   Electrophysiologist Haroldo Soriano MD   Monitor Lashell Hill RN   Circulator Anika Kilgore RN; Taniya Mcelroy RN       PATIENT HISTORY     Fernanda Estevez is a 61 y.o. male with ICM, LVEF 25%, CKD, PAF (Hx GIB), DM, Hepatitis C, CAD s/p  PCI whose LVEF has failed to improve post-revascularization. He is currently on milrinone for NYHA class III symptoms. He presents for implantation of an ICD. PROCEDURE     The patient was brought to the Cardiac Electrophysiology laboratory in a post-absorptive, fasting state. Informed consent was obtained. A peripheral IV was in place. Continuous electrocardiographic, blood pressure, O2 saturation and  CO2 monitoring was initiated. Intravenous antibiotics were administered pre-operatively. Self-adhesive cardioversion patches were positioned on the chest.  Conscious sedation was effectuated according to protocol. The patient was then prepped and draped in the usual sterile fashion. A left subclavian venogram was performed and demonstrated patency of the vein. A 50/50 mixture of lidocaine (1%) with epinephrine and bupivicaine (0.5%) was utilized for local anesthesia. An incision was performed medial to the left delto-pectoral groove. Sharp and blunt dissection was carried down to the level of the pre-pectoralis fascia. Hemostasis was maintained with electrocautery. Extra-thoracic, subclavian venous access was obtained and a sheath was placed using the modified Seldinger technique.  A single-coil, DF4 ICD lead was advanced under fluoroscopic guidance and positioned in the right ventricle where stable pacing and sensing characteristics were encountered. The sheath was peeled away and the lead was anchored to the pre-pectoralis fascia using O-ethibond non-absorbable suture material. A device pocket was then fashioned and flushed copiously with antibiotic solution. An ICD generator was connected to the lead and the generator was placed into the pocket. The device was secured to the pocket using O-ethibond, non-absorbable suture material. The pocket was then closed in three layers using 2-O vicryl, 3-O vicryl absorbable suture material and Dermabond. The skin was closed using a sub-cuticular technique. A bio-occlusive dressing were applied to the skin. The patient remained hemodynamically stable, tolerated the procedure well and was transferred in stable condition. There were no immediate complications encountered during the procedure. No specimen were removed; there was minimal blood loss. FINAL PROGRAMMING     Pacing Mode Lower Rate (ppm) Upper Rate (ppm)   VVI 40    VF Rate (bpm) # Antitachycardia Pacing First [employer] (J)   210 Quick Convert 41 x 8   VT Rate (bpm) # Antitachycardia Pacing First [employer] Energy (J)   185 Busts (3) 41 x 6         DEFIBRILLATION THRESHOLD TESTING     Deferred      MEDICATION SUMMARY     Medication Route Unit Total   Gentamycin IV mg 80   Ancef IV grams 2         CONCLUSIONS     1. Successful implantation of a single-chamber ICD. 2. IV antibiotics x 24 hours for 3 doses followed by Keflex 500 mg po tid x 5 days. 3. Monitor overnight on telemetry. 4. CXR (PA & lateral) and device interrogation in AM.  5. Possible discharge in AM.  6. Wound check in EP clinic in 7 days. 7. Follow up in EP clinic in 1 month or earlier if necessary. 8. Follow up with Antonia Cadena MD and Dr. Oliver Moreno as scheduled. Thank you, Antonia Cadena MD and Dr. Oliver Moreno for involving me in the care of this extraordinarily pleasant male.               Haroldo Soriano MD  Cardiac Electrophysiology / Cardiology    [address]  [address] [facility], [address], [address]  Dariel López [address] [place] [address], [facility]  [phone] / [phone] Fax     [phone] / [phone] Fax

## 2017-06-12 NOTE — PROGRESS NOTES
TRANSFER - OUT REPORT:    Verbal report given to Anand Pacheco RN on Fernanda Bonner.  being transferred to [facility] for routine progression of care       Report consisted of patients Situation, Background, Assessment and   Recommendations(SBAR). Information from the following report(s) SBAR was reviewed with the receiving nurse. Lines:   PICC Triple Lumen 01/25/17 Left;Brachial (Active)   Central Line Being Utilized Yes 6/12/2017  1:31 PM   Dressing Type Transparent 6/12/2017  1:31 PM   Positive Blood Return (Site #1) Yes 6/12/2017  1:31 PM   Hub Color/Line Status Red 6/12/2017  1:31 PM        Opportunity for questions and clarification was provided.       Patient transported with:   Registered Nurse

## 2017-06-12 NOTE — IP AVS SNAPSHOT
Current Discharge Medication List  
  
START taking these medications Dose & Instructions Dispensing Information Comments Morning Noon Evening Bedtime  
 cephALEXin 500 mg capsule Commonly known as:  [place] Your last dose was: Your next dose is:    
   
   
 Dose:  500 mg Take 1 Cap by mouth three (3) times daily for 5 days. Quantity:  15 Cap Refills:  0 HYDROcodone-acetaminophen 5-325 mg per tablet Commonly known as:  Crystal Mario Your last dose was: Your next dose is:    
   
   
 Dose:  1 Tab Take 1 Tab by mouth every six (6) hours as needed. Max Daily Amount: 4 Tabs. Quantity:  10 Tab Refills:  0 CONTINUE these medications which have NOT CHANGED Dose & Instructions Dispensing Information Comments Morning Noon Evening Bedtime  
 amiodarone 200 mg tablet Commonly known as:  CORDARONE Your last dose was: Your next dose is:    
   
   
 Dose:  200 mg Take 1 Tab by mouth every twelve (12) hours. Quantity:  60 Tab Refills:  1  
     
   
   
   
  
 aspirin delayed-release 81 mg tablet Your last dose was: Your next dose is:    
   
   
 Dose:  81 mg Take 1 Tab by mouth daily. Quantity:  30 Tab Refills:  1  
     
   
   
   
  
 atorvastatin 10 mg tablet Commonly known as:  LIPITOR Your last dose was: Your next dose is:    
   
   
 Dose:  10 mg Take 1 Tab by mouth daily. Quantity:  30 Tab Refills:  1  
     
   
   
   
  
 bumetanide 2 mg tablet Commonly known as:  Asa [place] Your last dose was: Your next dose is:    
   
   
 Dose:  1 mg Take 1 mg by mouth every other day. Refills:  0  
     
   
   
   
  
 carvedilol 3.125 mg tablet Commonly known as:  Claudette [place] Your last dose was: Your next dose is:    
   
   
 Dose:  3.125 mg Take 1 Tab by mouth two (2) times daily (with meals). Quantity:  60 Tab Refills:  1  
     
   
   
   
  
 clopidogrel 75 mg Tab Commonly known as:  PLAVIX Your last dose was: Your next dose is:    
   
   
 Dose:  75 mg Take 1 Tab by mouth daily. Quantity:  30 Tab Refills:  1 DULoxetine 30 mg capsule Commonly known as:  CYMBALTA Your last dose was: Your next dose is:    
   
   
 Dose:  30 mg Take 1 Cap by mouth daily. Quantity:  30 Cap Refills:  5  
     
   
   
   
  
 * insulin NPH/insulin regular 100 unit/mL (70-30) injection Commonly known as:  NOVOLIN 70/30, HUMULIN 70/30 Your last dose was: Your next dose is:    
   
   
 Dose:  8 Units 8 Units by SubCUTAneous route daily (with dinner). Refills:  0  
     
   
   
   
  
 * insulin NPH/insulin regular 100 unit/mL (70-30) injection Commonly known as:  NOVOLIN 70/30, HUMULIN 70/30 Your last dose was: Your next dose is:    
   
   
 Dose:  10 Units 10 Units by SubCUTAneous route daily (with breakfast). Refills:  0 Insulin Syringes (Disposable) 1 mL Syrg Your last dose was: Your next dose is:    
   
   
 Pt to take 10 units w/ breakfast and 8 units w/ dinner Quantity:  500 Syringe Refills:  1  
     
   
   
   
  
 loratadine 10 mg tablet Commonly known as:  Chantel Saldana Your last dose was: Your next dose is:    
   
   
 Dose:  10 mg Take 10 mg by mouth daily. Refills:  0  
     
   
   
   
  
 magnesium oxide 400 mg tablet Commonly known as:  MAG-OX Your last dose was: Your next dose is:    
   
   
 Dose:  400 mg Take 1 Tab by mouth daily. Quantity:  30 Tab Refills:  1  
     
   
   
   
  
 milrinone 20 mg/100 mL (200 mcg/mL) infusion Commonly known as:  Yvon Grahamin Your last dose was: Your next dose is:    
   
   
 Dose:  0.375 mcg/kg/min 28.425 mcg/min by IntraVENous route continuous. Quantity:  100 mL Refills:  1 MUCINEX PO Your last dose was: Your next dose is:    
   
   
 Dose:  600 mg Take 600 mg by mouth daily as needed. Refills:  0  
     
   
   
   
  
 sacubitril-valsartan 24-26 mg tablet Commonly known as:  ENTRESTO Your last dose was: Your next dose is:    
   
   
 Dose:  1 Tab Take 1 Tab by mouth two (2) times a day. Quantity:  60 Tab Refills:  3  
     
   
   
   
  
 spironolactone 25 mg tablet Commonly known as:  ALDACTONE Your last dose was: Your next dose is:    
   
   
 Dose:  25 mg Take 1 Tab by mouth daily. Quantity:  30 Tab Refills:  1 TYLENOL EXTRA STRENGTH 500 mg tablet Generic drug:  acetaminophen Your last dose was: Your next dose is:    
   
   
 Dose:  1000 mg Take 1,000 mg by mouth every six (6) hours as needed for Pain. Indications: BACK PAIN Refills:  0  
     
   
   
   
  
 * Notice: This list has 2 medication(s) that are the same as other medications prescribed for you. Read the directions carefully, and ask your doctor or other care provider to review them with you. Where to Get Your Medications These medications were sent to [address] [address] Phone:  [phone]  
  cephALEXin 500 mg capsule Information on where to get these meds will be given to you by the nurse or doctor. ! Ask your nurse or doctor about these medications HYDROcodone-acetaminophen 5-325 mg per tablet

## 2017-06-13 VITALS
HEIGHT: 70 IN | RESPIRATION RATE: 18 BRPM | SYSTOLIC BLOOD PRESSURE: 135 MMHG | DIASTOLIC BLOOD PRESSURE: 65 MMHG | BODY MASS INDEX: 24.52 KG/M2 | TEMPERATURE: 98 F | HEART RATE: 78 BPM | WEIGHT: 171.3 LBS | OXYGEN SATURATION: 96 %

## 2017-06-13 LAB
GLUCOSE BLD STRIP.AUTO-MCNC: 148 MG/DL (ref 65–100)
GLUCOSE BLD STRIP.AUTO-MCNC: 226 MG/DL (ref 65–100)
SERVICE CMNT-IMP: ABNORMAL
SERVICE CMNT-IMP: ABNORMAL

## 2017-06-13 PROCEDURE — 82962 GLUCOSE BLOOD TEST: CPT

## 2017-06-13 PROCEDURE — 77030008459 HC STPLR SKN COOP -B

## 2017-06-13 PROCEDURE — 74011000258 HC RX REV CODE- 258: Performed by: INTERNAL MEDICINE

## 2017-06-13 PROCEDURE — 74011250637 HC RX REV CODE- 250/637: Performed by: INTERNAL MEDICINE

## 2017-06-13 PROCEDURE — 74011636637 HC RX REV CODE- 636/637: Performed by: INTERNAL MEDICINE

## 2017-06-13 PROCEDURE — 74011250636 HC RX REV CODE- 250/636: Performed by: INTERNAL MEDICINE

## 2017-06-13 PROCEDURE — 99218 HC RM OBSERVATION: CPT

## 2017-06-13 RX ORDER — HYDROCODONE BITARTRATE AND ACETAMINOPHEN 5; 325 MG/1; MG/1
1 TABLET ORAL
Qty: 10 TAB | Refills: 0 | Status: SHIPPED | OUTPATIENT
Start: 2017-06-13 | End: 2017-06-19 | Stop reason: ALTCHOICE

## 2017-06-13 RX ORDER — CEPHALEXIN 500 MG/1
500 CAPSULE ORAL 3 TIMES DAILY
Qty: 15 CAP | Refills: 0 | Status: SHIPPED | OUTPATIENT
Start: 2017-06-13 | End: 2017-06-19 | Stop reason: ALTCHOICE

## 2017-06-13 RX ADMIN — INSULIN LISPRO 10 UNITS: 100 INJECTION, SUSPENSION SUBCUTANEOUS at 07:55

## 2017-06-13 RX ADMIN — AMIODARONE HYDROCHLORIDE 200 MG: 200 TABLET ORAL at 07:55

## 2017-06-13 RX ADMIN — CLOPIDOGREL 75 MG: 75 TABLET, FILM COATED ORAL at 09:11

## 2017-06-13 RX ADMIN — SACUBITRIL AND VALSARTAN 1 TABLET: 24; 26 TABLET, FILM COATED ORAL at 06:02

## 2017-06-13 RX ADMIN — MAGNESIUM GLUCONATE 500 MG ORAL TABLET 400 MG: 500 TABLET ORAL at 09:11

## 2017-06-13 RX ADMIN — CEFAZOLIN 2 G: 1 INJECTION, POWDER, FOR SOLUTION INTRAMUSCULAR; INTRAVENOUS; PARENTERAL at 04:18

## 2017-06-13 RX ADMIN — SPIRONOLACTONE 25 MG: 25 TABLET ORAL at 09:11

## 2017-06-13 RX ADMIN — CEFAZOLIN 2 G: 1 INJECTION, POWDER, FOR SOLUTION INTRAMUSCULAR; INTRAVENOUS; PARENTERAL at 11:01

## 2017-06-13 RX ADMIN — ASPIRIN 81 MG: 81 TABLET, COATED ORAL at 09:11

## 2017-06-13 RX ADMIN — Medication 10 ML: at 06:02

## 2017-06-13 RX ADMIN — LORATADINE 10 MG: 10 TABLET ORAL at 09:11

## 2017-06-13 RX ADMIN — HYDROCODONE BITARTRATE AND ACETAMINOPHEN 1 TABLET: 5; 325 TABLET ORAL at 09:11

## 2017-06-13 RX ADMIN — SODIUM CHLORIDE 0.38 MCG/KG/MIN: 900 INJECTION, SOLUTION INTRAVENOUS at 01:54

## 2017-06-13 RX ADMIN — CARVEDILOL 3.12 MG: 3.12 TABLET, FILM COATED ORAL at 07:54

## 2017-06-13 RX ADMIN — HYDROCODONE BITARTRATE AND ACETAMINOPHEN 1 TABLET: 5; 325 TABLET ORAL at 01:58

## 2017-06-13 RX ADMIN — BUMETANIDE 1 MG: 1 TABLET ORAL at 07:55

## 2017-06-13 NOTE — PROGRESS NOTES
Cardiology Progress Note    [facility]     NAME:  Treva Jaeger :   1950   MRN:   [id number]     Assessment/Plan:   1. POD # 1 single chamber ICD for prevention of SCD. No issues on chest xray or with device interrogation. Home after last dose of IV abx today. Kelfelx 5 dys post op. Wound ck arranged. 2. ICM:  LVEF 25% cont Entresto , coreg, primacor . 3. CKD         Subjective:   Treva Jaeger is a 61 y.o. male with ICM, LVEF 25%, CKD, PAF (Hx GIB), DM, Hepatitis C, CAD s/p  PCI whose LVEF has failed to improve post-revascularization. He is currently on milrinone for NYHA class III symptoms.             Cardiac ROS: Patient denies any exertional chest pain, dyspnea, palpitations, syncope, orthopnea, edema or paroxysmal nocturnal dyspnea. Overnight events:   none      Review of Systems: No nausea, indigestion, vomiting, pain, cough, sputum. No bleeding. Taking po. Appetite ok. OOB in room. Objective:     Visit Vitals    /65 (BP 1 Location: Right arm, BP Patient Position: At rest)    Pulse 78    Temp 98 °F (36.7 °C)    Resp 18    Ht 5' 10\" (1.778 m)    Wt 171 lb 4.8 oz (77.7 kg)    SpO2 96%    BMI 24.58 kg/m2      O2 Device: Room air    Temp (24hrs), Av.7 °F (36.5 °C), Min:97.4 °F (36.3 °C), Max:98 °F (36.7 °C)           1901 -  0700  In: 768.1 [P.O.:360; I.V.:308.1]  Out: 1150 [Urine:1150]  TELE:  SR     General: AAOx3 cooperative, no acute distress. HEENT: Atraumatic. Pink and moist.  Anicteric sclerae. Neck : Supple, no thyromegaly. Lungs: CTA bilaterally. No wheezing/rhonchi/rales. Heart: R arm sling on. ICD R infraclavicular space dsg D/I. Mild swelling,  no erythema, some tenderness. Regular rhythm, no murmur, no rubs, no gallops. No JVD. Abdomen: Soft, non-distended, non-tender. + Bowel sounds. No bruits. Extremities: No edema, no clubbing, no cyanosis. No calf tenderness  Neurologic: Grossly intact.   Alert and oriented X 3. No acute neurological distress. Psych: Good insight. Not anxious or agitated. Care Plan discussed with:    Comments   Patient x    Family  x spouse   RN x    Care Manager                    Consultant:          Data Review:     No lab exists for component: ITNL   No results for input(s): CPK, CKMB, TROIQ in the last 72 hours. Recent Labs      06/12/17   1317   WBC  7.7   HGB  10.8*   HCT  33.2*   PLT  161     No results for input(s): INR, PTP, APTT in the last 72 hours.     No lab exists for component: INREXT    Medications reviewed  Current Facility-Administered Medications   Medication Dose Route Frequency    sodium chloride (NS) flush 5-10 mL  5-10 mL IntraVENous Q8H    sodium chloride (NS) flush 5-10 mL  5-10 mL IntraVENous PRN    acetaminophen (TYLENOL) tablet 650 mg  650 mg Oral Q4H PRN    HYDROcodone-acetaminophen (NORCO) 5-325 mg per tablet 1 Tab  1 Tab Oral Q4H PRN    ceFAZolin in 0.9% NS (ANCEF) IVPB soln 2 g  2 g IntraVENous Q8H    loratadine (CLARITIN) tablet 10 mg  10 mg Oral DAILY    amiodarone (CORDARONE) tablet 200 mg  200 mg Oral BID WITH MEALS    atorvastatin (LIPITOR) tablet 10 mg  10 mg Oral DAILY    carvedilol (COREG) tablet 3.125 mg  3.125 mg Oral BID WITH MEALS    clopidogrel (PLAVIX) tablet 75 mg  75 mg Oral DAILY    spironolactone (ALDACTONE) tablet 25 mg  25 mg Oral DAILY    bumetanide (BUMEX) tablet 1 mg  1 mg Oral EVERY OTHER DAY    DULoxetine (CYMBALTA) capsule 30 mg  30 mg Oral DAILY    sacubitril-valsartan (ENTRESTO) 24-26 mg tablet 1 Tab  1 Tab Oral BID    aspirin delayed-release tablet 81 mg  81 mg Oral DAILY    magnesium oxide (MAG-OX) tablet 400 mg  400 mg Oral DAILY    insulin lispro protamine/insulin lispro (HUMALOG MIX 75/25) injection 8 Units  8 Units SubCUTAneous DAILY WITH DINNER    insulin lispro protamine/insulin lispro (HUMALOG MIX 75/25) injection 10 Units  10 Units SubCUTAneous DAILY WITH BREAKFAST    milrinone (PRIMACOR) 20 mg/100 mL NS infusion  0.375 mcg/kg/min IntraVENous CONTINUOUS         Fabian Lua NP

## 2017-06-13 NOTE — PROGRESS NOTES
Discharge instructions, including information on post ICD care and information on new medications, were reviewed with patient and his wife. All questions were answered. Heart monitor has been removed. Patient will be discharged home with his PICC line and will continue his Milrinone drip. Patient received a copy on his discharge papers and 1 prescription ( i other prescription was electronically sent to his pharmacy). Patient will be discharged home with his wife.

## 2017-06-13 NOTE — DISCHARGE INSTRUCTIONS
ICD  Discharge Instructions    Please make sure you have received your Temporary ICD identification card with your discharge instructions      MEDICATIONS         Take only the medications prescribed to you at discharge. ACTIVITY         Return to your normal activity, except as noted below. o Do not lift anything heavier than 10 pounds for 4 weeks with the affected arm. This is how long it takes the muscles to heal, and the leads inside your heart to stabilize their position. o Do not reach above your head with the affected arm for 4 weeks, doing so increases the risk of lead dislodgement.    o It is, however, important to move the affected arm to prevent shoulder stiffness and locking. o Avoid tight clothes or unnecessary pressure over your incision (such as bra straps or seat belts). If it is tender or sensitive to clothing, cover the incision with a soft dressing or pad.  o Questions about driving are individualized and should be discussed with one of the EP Physicians prior to discharge. SHOWERING         Leave the bandage over your incision for 7 days after the ICD implant. You bandage will be removed in clinic in 7 days.  It is important to keep the bandaged area clean and dry. You may shower around the site until the bandage is removed in clinic. Thereafter, you may shower after the bandage is removed, washing it gently with soap and water. Do not apply any lotions, powders, or perfumes to the incision line.  Avoid submerging your incision in water (tub baths, hot tubs, or swimming) for four weeks.  Underneath the dressing. o If you have white steri-strips over your incision (underneath the gauze dressing), they will curl up at the end and fall off, usually within 10 days. Do not pull them off.  - OR -   o You may have a different type of closure for the incision.   If Dermabond Adhesive was used to close your incision, you will receive a separate instruction sheet.      DISCHARGE PRECAUTIONS         Record your temperature every day, at the same time, for 3 weeks after your implant. A temperature of 100.5 F, or higher, can be the first sign of infection. This should be reported to your Doctor immediately.  You cannot have an MRI. You must be aware that any strong magnet or magnetic field can affect your ICD. In general, be careful of metal detectors, heavy machinery, and any area where arc-welding is performed. Avoid metal detectors such as the ones in security checkpoints at [facility] or [address] [place] [address]. When approaching a security checkpoint show your ICD Identification Card to security personnel and ask to be hand searched.  Always tell your doctor or dentist that you have an ICD. Antibiotics may be prescribed before certain procedures.  If you use a cell phone, hold it on the opposite side from where your ICD is implanted.  Your temporary identification will be given to you with these instructions. Keep your ICD card in your wallet or on your person at all times. You should receive your permanent card in 8 weeks. If you do not receive your permanent card, please call the office at [phone]. TAKING YOUR PULSE         Take your pulse the same time every day, preferably in the morning.  Sit down and rest for 5 minutes prior to taking your pulse.  Take your pulse for 1 full minute, use a clock or stop watch with a second hand.  To feel your pulse, use the first two fingers of one hand; place them on the thumb side of the wrist of the opposite hand. The pulse will be steady, regular and throbbing.  Call the office if your pulse is less than 40 beats per minute. SYMPTOMS THAT NEED TO BE REPORTED IMMEDIATELY         Temperature more than 100.4 F     Redness or warmth at the incision site, or pain for longer than the first 5 days after the implant.  Drainage from the incision site.      Swelling around the incision site.  Shortness of breath.  Rapid heart rate or palpitations.  Dizziness, lightheadedness, fainting.  Slow pulse below 40 beats per minute.  REMEMBER: If you feel something is an emergency or cannot be handled over the phone, call 911 or go to the closest emergency room. WHAT TO DO IF YOUR ICD DELIVERS A SHOCK     · If you ICD delivers a shock, you should seek attention at the nearest emergency room, call our office or call 911. Eva Cano MD  Cardiac Electrophysiology / Cardiology    [facility].  [address] [place] [address], [facility] [address], [address], [facility]. Marti Westbrook.         [facility], [facility]  [phone] / [phone] Fax       [phone] / [phone] Fax        Implantable Cardioverter-Defibrillator Placement: What to Expect at Home  Your Recovery     Implantable cardioverter-defibrillator (ICD) placement is surgery to put an ICD in your chest. An ICD is a small, battery-powered device that fixes life-threatening changes in your heartbeat. If the ICD detects a life-threatening rapid heart rhythm, it tries to slow the rhythm to get it back to normal. If the dangerous rhythm does not stop, the ICD sends an electric shock to the heart to restore a normal rhythm. The device then goes back to its watchful mode. Your chest may be sore where the doctor made the cut (incision) and put in the ICD. You also may have a bruise and mild swelling. These symptoms usually get better in 1 to 2 weeks. You may feel a hard ridge along the incision. This usually gets softer in the months after surgery. You probably will be able to see and feel the outline of the ICD under your skin. You will probably be able to go back to work or your usual routine 1 to 2 weeks after surgery.  Your doctor will talk to you about how often you will need to have the ICD checked. When you have an ICD, it is important to avoid electrical devices that can stop your ICD from working right. Check with your doctor about what you need to stay away from, what you need to use with care, and what is okay to use. You will need to stay away from things with strong magnetic and electrical fields such as MRI machines, welding equipment, and power generators. You can use a cell phone, but keep it at least 6 inches away from your ICD. You can safely use most household and office electronics such as kitchen appliances, electric power tools, and computers. This care sheet gives you a general idea about how long it will take for you to recover. But each person recovers at a different pace. Follow the steps below to get better as quickly as possible. How can you care for yourself at home? Activity  · Rest when you feel tired. Getting enough sleep will help you recover. · Try to walk each day. Start by walking a little more than you did the day before. Bit by bit, increase the amount you walk. Walking boosts blood flow and helps prevent pneumonia and constipation. · For 4 to 6 weeks:  ¨ Avoid activities that strain your chest or upper arm muscles. This includes pushing a  or vacuum, mopping floors, swimming, or swinging a golf club or tennis racquet. ¨ Do not raise your arm, on the side of your body where the ICD is located, above shoulder level. ¨ Avoid strenuous activities, such as bicycle riding, jogging, weight lifting, or heavy aerobic exercise. ¨ Avoid lifting anything that would make you strain. This may include heavy grocery bags and milk containers, a heavy briefcase or backpack, cat litter or dog food bags, or a child. · Ask your doctor when you can drive again. · You will probably need to take about 1 to 2 weeks off from work. It depends on the type of work you do and how you feel. · Ask your doctor when it is okay for you to have sex.   Diet  · You can eat your normal diet. If your stomach is upset, try bland, low-fat foods like plain rice, broiled chicken, toast, and yogurt. · Drink enough fluids to stay well-hydrated, while avoiding fluid overload. Medicines  · Your doctor will tell you if and when you can restart your medicines. He or she will also give you instructions about taking any new medicines. · If you take blood thinners, such as warfarin (Coumadin), prasurgel (Effient), or aspirin, be sure to talk to your doctor. He or she will tell you if and when to start taking those medicines again. Make sure that you understand exactly what your doctor wants you to do. · Take pain medicines exactly as directed. ¨ If the doctor gave you a prescription medicine for pain, take it as prescribed. ¨ If you are not taking a prescription pain medicine, ask your doctor if you can take an over-the-counter medicine. ¨ Do not take aspirin, ibuprofen (Advil, Motrin), naproxen (Aleve), or other nonsteroidal anti-inflammatory drugs (NSAIDs) unless your doctor says it is okay. · If you think your pain medicine is making you sick to your stomach:  ¨ Take your medicine after meals (unless your doctor has told you not to). ¨ Ask your doctor for a different pain medicine. · Be sure to take your prescribed antibiotic as directed. Do not stop taking them just because you feel better. You need to take the full course of antibiotics. Incision care   · Keep the area clean and dry. Do not take a bath or go in a swimming pool, until approved by your cardiologist.   · You may take a shower, but be careful to keep the spray to your back. Do not soak the dressing. · Your dressing will be removed at your next cardiology appointment. · Once the dressing is removed, following instructions for incision care.    Exercise  · Check with your doctor before you start an exercise program. Your doctor may recommend that you work with a physical therapist to learn how to exercise safely with an ICD.  Other instructions  · Carry your ICD identification card with you at all times. The card should include the ICD  and model information. · Wear medical alert jewelry that states you have an ICD. You can buy this at most drugstores. · Have your ICD checked as often as your doctor recommends. In some cases, this may be done over the phone or the Internet. Your doctor will give you instructions about how to do this. · Talk with your doctor about the possibility of turning off the ICD at the end of life. You can put your wishes about the ICD in an advance directive. Follow-up care is a key part of your treatment and safety. Be sure to make and go to all appointments, and call your doctor if you are having problems. It's also a good idea to know your test results and keep a list of the medicines you take. When should you call for help? Call 911 anytime you think you may need emergency care. For example, call if:  · You passed out (lost consciousness). · You have severe trouble breathing. · You have sudden chest pain and shortness of breath, or you cough up blood. · You receive more than 1 shock from your ICD. · You have symptoms of a heart attack. These may include:  ¨ Chest pain or pressure, or a strange feeling in the chest.  ¨ Sweating. ¨ Shortness of breath. ¨ Nausea or vomiting. ¨ Pain, pressure, or a strange feeling in the back, neck, jaw, or upper belly or in one or both shoulders or arms. ¨ Lightheadedness or sudden weakness. ¨ A fast or irregular heartbeat. After you call 911, the  may tell you to chew 1 adult-strength or 2 to 4 low-dose aspirin. Wait for an ambulance. Do not try to drive yourself. Call your doctor now or seek immediate medical care if:  · You receive 1 shock from your ICD. · Your heartbeat feels very fast or slow, skips beats, or flutters. · You are dizzy or lightheaded, or you feel like you may faint.   · You have new or increased shortness of breath. · You have pain that does not get better after you take pain medicine. · You have loose stitches, or your incision comes open. · Bright red blood has soaked through the bandage over your incision. · You have signs of infection, such as:  ¨ Increased pain, swelling, warmth, or redness. ¨ Red streaks leading from the incision. ¨ Pus draining from the incision. ¨ A fever. · You have signs of a blood clot, such as:  ¨ Pain in your calf, back of the knee, thigh, or groin. ¨ Redness and swelling in your leg or groin. Watch closely for changes in your health, and be sure to contact your doctor if:  · You have any problems with your ICD. Where can you learn more? Go to http://erik-dianna.info/  Enter K184 in the search box to learn more about \"Implantable Cardioverter-Defibrillator Placement: What to Expect at Home. \"  © 6727-3162 [employer]. Care instructions adapted under license by [employer] (which disclaims liability or warranty for this information). This care instruction is for use with your licensed healthcare professional. If you have questions about a medical condition or this instruction, always ask your healthcare professional. [place] [address] any warranty or liability for your use of this information. Content Version: 52.5.700533; Current as of: January 27, 2016 (modified 10/25/16). Avoiding Triggers With Heart Failure: Care Instructions  Your Care Instructions  Triggers are anything that make your heart failure flare up. A flare-up is also called \"sudden heart failure\" or \"acute heart failure. \" When you have a flare-up, fluid builds up in your lungs, and you have problems breathing. You might need to go to the hospital. By watching for changes in your condition and avoiding triggers, you can prevent heart failure flare-ups. Follow-up care is a key part of your treatment and safety.  Be sure to make and go to all appointments, and call your doctor if you are having problems. It's also a good idea to know your test results and keep a list of the medicines you take. How can you care for yourself at home? Watch for changes in your weight and condition  · Weigh yourself without clothing at the same time each day. Record your weight. Call your doctor if you gain 3 pounds or more in 24 hrs or 5 pounds in one week. A sudden weight gain may mean that your heart failure is getting worse. · Keep a daily record of your symptoms. Write down any changes in how you feel, such as new shortness of breath, cough, or problems eating. Also record if your ankles are more swollen than usual and if you have to urinate in the night more often. Note anything that you ate or did that could have triggered these changes. Limit sodium  Sodium causes your body to hold on to water, making it harder for your heart to pump. People get most of their sodium from processed foods. Fast food and restaurant meals also tend to be very high in sodium. · Your doctor may suggest that you limit sodium to 1,500 milligrams (mg) a day. That is less than 1 teaspoon of salt a day, including all the salt you eat in cooking or in packaged foods. · Read food labels on cans and food packages. They tell you how much sodium you get in one serving. Check the serving size. If you eat more than one serving, you are getting more sodium. · Be aware that sodium can come in forms other than salt, including monosodium glutamate (MSG), sodium citrate, and sodium bicarbonate (baking soda). MSG is often added to Asian food. You can sometimes ask for food without MSG or salt. · Slowly reducing salt will help you adjust to the taste. Take the salt shaker off the table. · Flavor your food with garlic, lemon juice, onion, vinegar, herbs, and spices instead of salt.  Do not use soy sauce, steak sauce, onion salt, garlic salt, mustard, or ketchup on your food, unless it is labeled \"low-sodium\" or \"low-salt. \"  · Make your own salad dressings, sauces, and ketchup without adding salt. · Use fresh or frozen ingredients, instead of canned ones, whenever you can. Choose low-sodium canned goods. · Eat less processed food and food from restaurants, including fast food. Exercise as directed  Moderate, regular exercise is very good for your heart. It improves your blood flow and helps control your weight. But too much exercise can stress your heart and cause a heart failure flare-up. · Check with your doctor before you start an exercise program.  · Walking is an easy way to get exercise. Start out slowly. Gradually increase the length and pace of your walk. Swimming, riding a bike, and using a treadmill are also good forms of exercise. · When you exercise, watch for signs that your heart is working too hard. You are pushing yourself too hard if you cannot talk while you are exercising. If you become short of breath or dizzy or have chest pain, stop, sit down, and rest.  · Do not exercise when you do not feel well. Take medicines correctly  · Take your medicines exactly as prescribed. Call your doctor if you think you are having a problem with your medicine. · Make a list of all the medicines you take. Include those prescribed to you by other doctors and any over-the-counter medicines, vitamins, or supplements you take. Take this list with you when you go to any doctor. · Take your medicines at the same time every day. It may help you to post a list of all the medicines you take every day and what time of day you take them. · Make taking your medicine as simple as you can. Plan times to take your medicines when you are doing other things, such as eating a meal or getting ready for bed. This will make it easier to remember to take your medicines. · Get organized. Use helpful tools, such as daily or weekly pill containers. When should you call for help?   Call 911 if you have symptoms of sudden heart failure such as:  · You have severe trouble breathing. · You cough up pink, foamy mucus. · You have a new irregular or rapid heartbeat. Call your doctor now or seek immediate medical care if:  · You have new or increased shortness of breath. · You are dizzy or lightheaded, or you feel like you may faint. · You have sudden weight gain, such as 3 pounds in 24 hours, or 5 pounds in one week. · You have increased swelling in your legs, ankles, or feet. · You are suddenly so tired or weak that you cannot do your usual activities. Watch closely for changes in your health, and be sure to contact your doctor if you develop new symptoms. Where can you learn more? Go to http://erik-dianna.info/  Enter V089 in the search box to learn more about \"Avoiding Triggers With Heart Failure: Care Instructions. \"  © 1638-0495 [employer]. Care instructions adapted under license by [employer] (which disclaims liability or warranty for this information). This care instruction is for use with your licensed healthcare professional. If you have questions about a medical condition or this instruction, always ask your healthcare professional. [place] [address] any warranty or liability for your use of this information. Content Version: 92.8.315441; Current as of: January 27, 2016 (modified 10/10/16).

## 2017-06-13 NOTE — PROGRESS NOTES
6- CASE MANAGEMENT NOTE:  I met with the pt and his wife, Veronika Phelps ([phone]), to determine potential discharge needs. The pt and his wife live in a one story house with 3 entry steps. He has been independent with his ADL's and driving. He has been receiving IV Milrinone at home supplied by [place] and either his wife or neighbor change the IV bag as needed. He stated he was denied for Social Security disability and when I checked with our [employer] representative was told that he was denied as he had already been receiving regular Social Security for the past several years. Per [employer], the pt's Care Card application was also denied as they are over resource limits. I explained thei to the pt and his wife. The pt's PCP is Dr. Izaiah Mcgregor and I notified his nurse navigators of this admission. He has no health insurance (will be eligible for Medicare in Nov., 2017) and gets his medications from [facility] on [address] [facility] [place]. I explained the Observation status, he signed the [employer], was given a copy and the original was placed on his chart. No discharge needs were identified. Care Management Interventions  PCP Verified by CM:  Yes (Dr. Price Corona navigators notified of admission)  Discharge Durable Medical Equipment: No  Physical Therapy Consult: No  Occupational Therapy Consult: No  Speech Therapy Consult: No  Current Support Network: Lives with Spouse, Own Home  Confirm Follow Up Transport: Family  Plan discussed with Pt/Family/Caregiver: Yes  Discharge Location  Discharge Placement:  (Home with wife)    [place], [place], CM

## 2017-06-13 NOTE — CARDIO/PULMONARY
Cardiac rehab:  6/13/2017 @ 0845:  Received consult for cardiac teaching on post-ICD instructions. S/P ICD, by Dr Fahad Angela (6/12/17). Patient is familiar to Cardiac Rehab RN from prolonged admission with acute CHF & severe CAD (1/20/17), and subsequent transfer to [facility]. S/P PCI with stents to LM & RCA (2/9/17). LVEF 25%. Cardiologist is Dr Norma Roe. Pt is followed closely at the [facility] clinic. Currently on milrinone gtt for NYHA class III symptoms. Sleeps in recliner. Hx also includes PAF; no anticoag due to GIB. Former smoker (quit 1/20/17). Met with patient, who was sitting up in bed on [facility]. Wife is present in room and received permission to discuss health issues with wife present. Explained educational role of Cardiac Rehab RN. Cardiac Meds:  ACE/ARB - Entresto  BB - carvedilol  Statin - atorvastatin  ASA - 81 mg  Prior to admission meds also included: Plavix, amiodarone, spironolactone, Bumex, and mag oxide. Discussed pt's understanding of procedure and plan of care. Pt reported awareness of sHF hx and need for ICD   Pt already has ICD booklet/card. Pt reported he has had chest xray and ICD was interrogated this AM.  Reviewed post-ICD insertion instructions, with emphasis on temporary restrictions, signs/symptoms of infection, wound care,  f/u with MD, what to do if device delivers a shock and importance of taking all meds as ordered. Discussed new meds: Keflex and Norco.   Discussed purpose and side effects Attached information on new PO meds to AVS.   Reviewed Heart failure info including heart health/low Na diet, daily weights and taking meds as perscibed. Pt reported he has pill container and is organized with taking meds and monitoring his PICC line and milrinone. Also provided information from [employer] on Eating for Healthier Heart and low Na diet management. Patient/wife verbalized understanding of info provided, and all questions were answered.

## 2017-06-14 ENCOUNTER — HOME CARE VISIT (OUTPATIENT)
Dept: SCHEDULING | Facility: HOME HEALTH | Age: 67
End: 2017-06-14

## 2017-06-14 PROCEDURE — G0299 HHS/HOSPICE OF RN EA 15 MIN: HCPCS

## 2017-06-15 VITALS
HEART RATE: 77 BPM | TEMPERATURE: 98.5 F | OXYGEN SATURATION: 98 % | WEIGHT: 169 LBS | SYSTOLIC BLOOD PRESSURE: 136 MMHG | DIASTOLIC BLOOD PRESSURE: 64 MMHG | BODY MASS INDEX: 24.25 KG/M2 | RESPIRATION RATE: 18 BRPM

## 2017-06-19 ENCOUNTER — OFFICE VISIT (OUTPATIENT)
Dept: CARDIOLOGY CLINIC | Age: 67
End: 2017-06-19

## 2017-06-19 VITALS
DIASTOLIC BLOOD PRESSURE: 76 MMHG | SYSTOLIC BLOOD PRESSURE: 118 MMHG | WEIGHT: 173.4 LBS | RESPIRATION RATE: 16 BRPM | BODY MASS INDEX: 24.82 KG/M2 | HEIGHT: 70 IN | HEART RATE: 78 BPM

## 2017-06-19 DIAGNOSIS — I25.5 ISCHEMIC CARDIOMYOPATHY: Primary | ICD-10-CM

## 2017-06-19 NOTE — PROGRESS NOTES
Patient presents for wound check post-device implantation. The dressing was removed and the site was inspected. The site appeared to be well-healing without ecchymosis/tenderness/erythema. Denies pain, fevers, discharge. Plan:    Steri strip applied to superior portion of incision. Continue follow up in device clinic as planned.      Christina Barcenas NP

## 2017-06-22 ENCOUNTER — HOME CARE VISIT (OUTPATIENT)
Dept: SCHEDULING | Facility: HOME HEALTH | Age: 67
End: 2017-06-22

## 2017-06-22 ENCOUNTER — TELEPHONE (OUTPATIENT)
Dept: CARDIOLOGY CLINIC | Age: 67
End: 2017-06-22

## 2017-06-22 VITALS
OXYGEN SATURATION: 98 % | BODY MASS INDEX: 23.93 KG/M2 | TEMPERATURE: 98.9 F | WEIGHT: 166.8 LBS | RESPIRATION RATE: 18 BRPM | HEART RATE: 83 BPM | SYSTOLIC BLOOD PRESSURE: 122 MMHG | DIASTOLIC BLOOD PRESSURE: 68 MMHG

## 2017-06-22 PROCEDURE — G0299 HHS/HOSPICE OF RN EA 15 MIN: HCPCS

## 2017-06-23 ENCOUNTER — TELEPHONE (OUTPATIENT)
Dept: CARDIOLOGY CLINIC | Age: 67
End: 2017-06-23

## 2017-06-23 NOTE — TELEPHONE ENCOUNTER
Chandler Bronson called cardiac surgery requesting a return call regarding patient.       Please call her back at #[phone]

## 2017-06-23 NOTE — TELEPHONE ENCOUNTER
Returned call to Holden POWELL RN. Gave her orders for weekly labs: CMP, CBC, pro-BNP. He has been re-certified for [place] for an additional 60 days.

## 2017-06-27 ENCOUNTER — TELEPHONE (OUTPATIENT)
Dept: CARDIOLOGY CLINIC | Age: 67
End: 2017-06-27

## 2017-06-27 ENCOUNTER — OFFICE VISIT (OUTPATIENT)
Dept: CARDIOLOGY CLINIC | Age: 67
End: 2017-06-27

## 2017-06-27 VITALS
SYSTOLIC BLOOD PRESSURE: 140 MMHG | HEART RATE: 87 BPM | RESPIRATION RATE: 16 BRPM | WEIGHT: 176.2 LBS | TEMPERATURE: 97.7 F | BODY MASS INDEX: 25.22 KG/M2 | HEIGHT: 70 IN | OXYGEN SATURATION: 98 % | DIASTOLIC BLOOD PRESSURE: 70 MMHG

## 2017-06-27 DIAGNOSIS — E11.40 CONTROLLED TYPE 2 DIABETES MELLITUS WITH DIABETIC NEUROPATHY, WITHOUT LONG-TERM CURRENT USE OF INSULIN (HCC): ICD-10-CM

## 2017-06-27 DIAGNOSIS — I25.5 ISCHEMIC CARDIOMYOPATHY: ICD-10-CM

## 2017-06-27 DIAGNOSIS — K92.2 LOWER GI BLEEDING: ICD-10-CM

## 2017-06-27 DIAGNOSIS — E13.40 NEUROPATHY DUE TO SECONDARY DIABETES (HCC): ICD-10-CM

## 2017-06-27 DIAGNOSIS — I50.41 ACUTE COMBINED SYSTOLIC AND DIASTOLIC ACC/AHA STAGE C CONGESTIVE HEART FAILURE (HCC): ICD-10-CM

## 2017-06-27 NOTE — MR AVS SNAPSHOT
Visit Information Date & Time Provider Department Dept. Phone Encounter #  
 6/27/2017  2:00 PM Raúl Elias MD [address] [id number] Follow-up Instructions Return in about 4 weeks (around 7/25/2017). Follow-up and Disposition History Your Appointments 7/12/2017  9:15 AM  
PACEMAKER with PACEMAKER, ALYSSA  
[facility] ([place] SCHEDULING) Appt Note: 1 mo hosp and device check  sll [address] [facility] [address]  
[phone]  
  
   
 [address] [facility] [address]  
  
    
 7/12/2017  9:20 AM  
HOSPITAL DISCHARGE with Denice Restrepo MD  
[facility] ([facility]) Appt Note: 1 mo hosp and device check  sll [address] [facility] [facility]  
[phone]  
  
   
 [address] [place] [address]  
  
    
 8/15/2017  3:00 PM  
ESTABLISHED PATIENT with Tk Vega MD  
[facility] ([place] SCHEDULING) Appt Note: 5 mo fup  
 [address] [facility]  
[address] [place] [address] [address] [place] Upcoming Health Maintenance Date Due  
 FOOT EXAM Q1 11/23/1962 MICROALBUMIN Q1 11/23/1962 EYE EXAM RETINAL OR DILATED Q1 11/23/1962 Pneumococcal 19-64 Medium Risk (1 of 1 - PPSV23) 11/23/1971 DTaP/Tdap/Td series (1 - Tdap) 11/23/1973 ZOSTER VACCINE AGE 60> 11/23/2012 HEMOGLOBIN A1C Q6M 7/19/2017 INFLUENZA AGE 9 TO ADULT 8/1/2017 LIPID PANEL Q1 1/19/2018 FOBT Q 1 YEAR AGE 50-75 2/16/2018 Allergies as of 6/27/2017  Review Complete On: 6/27/2017 By: Keisha Bentley RN Severity Noted Reaction Type Reactions Heparin (Porcine)  02/07/2017    Unknown (comments) Current Immunizations  Reviewed on 2/14/2017 No immunizations on file. Not reviewed this visit You Were Diagnosed With   
  
 Codes Comments Ischemic cardiomyopathy     ICD-10-CM: I25.5 ICD-9-CM: 414.8 Acute combined systolic and diastolic ACC/AHA stage C congestive heart failure (HCC)     ICD-10-CM: I50.41 ICD-9-CM: 428.41, 428.0 Neuropathy due to secondary diabetes ([facility]     ICD-10-CM: E13.40 ICD-9-CM: 249.60, 357.2 Controlled type 2 diabetes mellitus with diabetic neuropathy, without long-term current use of insulin (HCC)     ICD-10-CM: E11.40 ICD-9-CM: 250.60, 357.2 Lower GI bleeding     ICD-10-CM: K92.2 ICD-9-CM: 578.9 Vitals BP Pulse Temp Resp Height(growth percentile) Weight(growth percentile) 140/70 (BP 1 Location: Right arm, BP Patient Position: Sitting) 87 97.7 °F (36.5 °C) 16 5' 10\" (1.778 m) 176 lb 3.2 oz (79.9 kg) SpO2 BMI Smoking Status 98% 25.28 kg/m2 Former Smoker BMI and BSA Data Body Mass Index Body Surface Area  
 25.28 kg/m 2 1.99 m 2 Preferred Pharmacy Pharmacy Name Phone [facility] [address] [place] [address] [phone] Your Updated Medication List  
  
   
This list is accurate as of: 6/27/17  3:03 PM.  Always use your most recent med list.  
  
  
  
  
 amiodarone 200 mg tablet Commonly known as:  CORDARONE Take 1 Tab by mouth every twelve (12) hours. aspirin delayed-release 81 mg tablet Take 1 Tab by mouth daily. atorvastatin 10 mg tablet Commonly known as:  LIPITOR Take 1 Tab by mouth daily. bumetanide 2 mg tablet Commonly known as:  Merribeth Schneiders Take 1 mg by mouth every other day. carvedilol 3.125 mg tablet Commonly known as:  Rosina Breed Take 1 Tab by mouth two (2) times daily (with meals). clopidogrel 75 mg Tab Commonly known as:  PLAVIX Take 1 Tab by mouth daily. DULoxetine 30 mg capsule Commonly known as:  CYMBALTA Take 1 Cap by mouth daily. insulin NPH/insulin regular 100 unit/mL (70-30) injection Commonly known as:  NOVOLIN 70/30, HUMULIN 70/30 Take 10 units in the morning and 8 units in the evening. Insulin Syringes (Disposable) 1 mL Syrg Pt to take 10 units w/ breakfast and 8 units w/ dinner  
  
 loratadine 10 mg tablet Commonly known as:  Theodora Amble Take 10 mg by mouth daily. magnesium oxide 400 mg tablet Commonly known as:  MAG-OX Take 1 Tab by mouth daily. milrinone 20 mg/100 mL (200 mcg/mL) infusion Commonly known as:  PRIMACOR  
28.425 mcg/min by IntraVENous route continuous. sacubitril-valsartan 49-51 mg Tab tablet Commonly known as:  ENTRESTO Take 1 Tab by mouth two (2) times a day. spironolactone 25 mg tablet Commonly known as:  ALDACTONE Take 1 Tab by mouth daily. TYLENOL EXTRA STRENGTH 500 mg tablet Generic drug:  acetaminophen Take 1,000 mg by mouth every six (6) hours as needed for Pain. Indications: BACK PAIN Prescriptions Sent to Pharmacy Refills  
 sacubitril-valsartan (ENTRESTO) 49 mg/51 mg tablet (Discontinued) 3 Sig: Take 1 Tab by mouth two (2) times a day. Class: Normal  
 Pharmacy: [address] [place] [address], [address] [place] [address] Ph #: [phone] Route: Oral  
 Reason for Discontinue: Reorder Follow-up Instructions Return in about 4 weeks (around 7/25/2017). To-Do List   
 06/29/2017 To Be Determined Appointment with Cathy Torrez RN at [place] [address]  
  
 07/05/2017 To Be Determined Appointment with Cathy Torrez RN at [place] [address]  
  
 07/12/2017 To Be Determined Appointment with Cathy Torrez RN at [place] [address]  
  
 07/19/2017 To Be Determined Appointment with Cathy Torrez RN at [place] [address]  
  
 07/26/2017 To Be Determined Appointment with Marilyn Daly RN at [place] [address]  
  
 08/02/2017 To Be Determined Appointment with Marilyn Daly RN at [place] [address]  
  
 08/09/2017 To Be Determined Appointment with Marilyn Daly RN at [place] [address]  
  
 08/16/2017 To Be Determined Appointment with Marilyn aDly RN at [place] [address] Patient Instructions 1. Increase Entresto 49/51 1 tablet twice a day. 2.  Continue Coreg and Aldactone. 3.  Change Bumex to as needed. Contact the office if you develop swelling, increase in your weight or shortness of breath 4. Continue daily weights, low sodium diet and watching fluid intake 5. Daily exercise. 6.  Return to [place] [address] in 4 weeks. Patient Instructions History Introducing [facility] SERVICES! Brooklyn Alcantara introduces [employer]-Generator patient portal. Now you can access parts of your medical record, email your doctor's office, and request medication refills online. 1. In your internet browser, go to [email or web address] 2. Click on the First Time User? Click Here link in the Sign In box. You will see the New Member Sign Up page. 3. Enter your E-Generator Access Code exactly as it appears below. You will not need to use this code after youve completed the sign-up process. If you do not sign up before the expiration date, you must request a new code. · E-Generator Access Code: [email or web address] Expires: 7/24/2017 11:40 AM 
 
4. Enter the last four digits of your Social Security Number (xxxx) and Date of Birth (mm/dd/yyyy) as indicated and click Submit. You will be taken to the next sign-up page. 5. Create a E-Generator ID. This will be your E-Generator login ID and cannot be changed, so think of one that is secure and easy to remember. 6. Create a [employer] password. You can change your password at any time. 7. Enter your Password Reset Question and Answer. This can be used at a later time if you forget your password. 8. Enter your e-mail address. You will receive e-mail notification when new information is available in [address] 9. Click Sign Up. You can now view and download portions of your medical record. 10. Click the Download Summary menu link to download a portable copy of your medical information. If you have questions, please visit the Frequently Asked Questions section of the [employer] website. Remember, [employer] is NOT to be used for urgent needs. For medical emergencies, dial 911. Now available from your iPhone and Android! Please provide this summary of care documentation to your next provider. Your primary care clinician is listed as MICHAEL BALLARD. If you have any questions after today's visit, please call [phone].

## 2017-06-27 NOTE — LETTER
2017 2:56 PM 
 
Patient:  Aravind Hebert. YOB: 1950 Date of Visit: 2017 Dear Angel Moore MD 
[address] [address] VIA In Basket Lasha Jarrell MD 
[address] [address] VIA In Basket Jasvir Yanes MD 
[facility] [address] [facility] [address] [address] VIA Facsimile: [phone] Han Pretty MD 
[address] [place] [phone] [address] [address] VIA In Basket 
 : Thank you for referring Mr. Kevin Gibson to me for evaluation/treatment. Below are the relevant portions of my assessment and plan of care. [facility] Note NAME:  Aravind Hebert. :   1950 MRN:   [id number] PCP:  Lasha Jarrell MD 
 
Date:  2017 IMPRESSION/PLAN: 
 
ICM, EF 25%, home inotrope, NYHA class II-III Continue milrinone, Coreg, and Spironolactone Increase Entresto 49/51 1 tab po BID Change Bumex to prn Check labs in 2 weeks Pt to continue daily weights, low sodium diet and fluid consumption to 2 L/daily Return to [facility] in 4 weeks HTN Increase Entresto 49/51 1 tab po BID Cont. Coreg at current dose CAD s/p PCI Continue ASA, Plavix, Coreg and statin per cardiology Claudication ABIs c/w moderate PAD bilaterally Refer to vascular surgery if symptoms worsen CKD Followed by renal 
Stable, repeat labs in 2 weeks on higher dose of Entresto PAF Continue amiodarone and Coreg - managed per cardiology H/O lower GIB Will need colonic polyp and AVM treated at some point - denies any bleeding issues Will arrange for f/u once off Plavix H/O Tobacco Abuse Pt denies smoking Reinforced importance of smoking cessation Diabetes Insulin management by PCP Diabetic neuropathy He states neuropathy has resolved and he no longer requires neurontin Cymbalta 30 mg daily Hepatitis C 
 Undetectable viral load, needs second HCV RNA - will repeat with next lab draw per Dr. Pepper De Jesus recommendations. Subjective:  
 
Mr. Ghazala Dominguez is a 62 yo, C male w/ PMH CAD s/p PCI to left main and  of RCA 0n 2/9/17,  ICM (LVEF 10%), h/o PAF, s/p ICD, h/o Tob abuse, Hep C (undetectable viral load), h/o GIB w/ colonic polyp and AVM, who presents today for follow up. He reports overall he feels great. He is cleaning around the house and reports he is very active. He had his ICD placed a few weeks ago. He denies any shocks from it. He is sleeping on one pillow and denies orthopnea. He is performing daily weights. His weight is stable. He has a healthy appetite. He is watching his salt intakes and reading labels. He is drinking about 64 ounces a day. He is compliant with all his medications. He denies any tobacco use. He denies any alcohol or illicit drug use. He has stopped taking the gabapentin - he states he doesn't need it anymore. ROS Pt c/o headaches, cough productive clear sputum, diarrhea, claudication w/ \"great distance\". Pt denies fatigue, fevers, chills, diaphoresis, lightheadedness, dizziness, syncope, N/V, changes to appetite, changes to weight, early satiety, CP, palpitations, SOB, orthopnea, PND, constipation, edema, abdominal fullness, depression or anxiety, hematuria, BRBPR, melena. Objective:  
 
Visit Vitals  /70 (BP 1 Location: Right arm, BP Patient Position: Sitting)  Pulse 87  Temp 97.7 °F (36.5 °C)  Resp 16  
 Ht 5' 10\" (1.778 m)  Wt 176 lb 3.2 oz (79.9 kg)  SpO2 98%  BMI 25.28 kg/m2 Physical Exam 
Gen:   WA, WN, NAD HEENT:  EOMI, Neck:   supple, trache midline, (-) JVD 
CVS:      S1/S2 Pul:  CTA b/l (-) r,r,w 
Abd:  +BS, soft, NT Ext:  (-) edema, L PICC line site (-) erythema (-) swelling Neuro:   No obvious deficits ICD site R SCV:  Inc c/d/i, healing well, (-( erythema Past History: Past Medical History:  
Diagnosis Date  Cardiomyopathy ([facility] 1/20/2017 A. Echo (1/19/17):  EF 5-10% with severe GHK,. Mildly dil LA. Mild TR. PASP 46.  
 Chronic renal insufficiency 3/6/2017  Diabetes mellitus ([facility] 3/6/2017  Dyslipidemia 3/6/2017 A. FLP (1/19/17): Tot 120, , HDL 19, LDL 76 (no Rx).  H/O: GI bleed 3/6/2017  Hepatitis C 3/6/2017  Paroxysmal atrial fibrillation ([facility] 3/6/2017 No family history on file. Past Surgical History:  
Procedure Laterality Date  COLONOSCOPY N/A 2/17/2017 COLONOSCOPY performed by Willi Leahy. Juliocesar Mancini MD at [facility] ENDOSCOPY Social History Substance Use Topics  Smoking status: Former Smoker Packs/day: 2.00 Years: 45.00 Quit date: 1/20/2017  Smokeless tobacco: Never Used  Alcohol use No  
  
 
No family history on file. Allergies: Allergies Allergen Reactions  Heparin (Porcine) Unknown (comments) Data Review:  
 
Medications reviewed: 
 
Current Outpatient Prescriptions Medication Sig  
 acetaminophen (TYLENOL EXTRA STRENGTH) 500 mg tablet Take 1,000 mg by mouth every six (6) hours as needed for Pain. Indications: BACK PAIN  
 bumetanide (BUMEX) 2 mg tablet Take 1 mg by mouth every other day.  insulin NPH/insulin regular (NOVOLIN 70/30, HUMULIN 70/30) 100 unit/mL (70-30) injection Take 10 units in the morning and 8 units in the evening.  amiodarone (CORDARONE) 200 mg tablet Take 1 Tab by mouth every twelve (12) hours.  spironolactone (ALDACTONE) 25 mg tablet Take 1 Tab by mouth daily.  carvedilol (COREG) 3.125 mg tablet Take 1 Tab by mouth two (2) times daily (with meals).  atorvastatin (LIPITOR) 10 mg tablet Take 1 Tab by mouth daily.  clopidogrel (PLAVIX) 75 mg tab Take 1 Tab by mouth daily.  DULoxetine (CYMBALTA) 30 mg capsule Take 1 Cap by mouth daily.  sacubitril-valsartan (ENTRESTO) 24 mg/26 mg tablet Take 1 Tab by mouth two (2) times a day.  aspirin delayed-release 81 mg tablet Take 1 Tab by mouth daily.  magnesium oxide (MAG-OX) 400 mg tablet Take 1 Tab by mouth daily.  milrinone (PRIMACOR) 20 mg/100 mL (200 mcg/mL) infusion 28.425 mcg/min by IntraVENous route continuous.  Insulin Syringes, Disposable, 1 mL syrg Pt to take 10 units w/ breakfast and 8 units w/ dinner  loratadine (CLARITIN) 10 mg tablet Take 10 mg by mouth daily. No current facility-administered medications for this visit. Follow-up Disposition: 
Return in about 4 weeks (around 7/25/2017). Thank you for letting us see Mr Lit Lopez with you, KENYETTA Talley [address] If you have questions, please do not hesitate to call me. I look forward to following Mr. Lit Lopez along with you. Sincerely, Chao Fuller MD

## 2017-06-27 NOTE — COMMUNICATION BODY
[facility] Clinic Note      NAME:  Sudeep Dumont. :   1950   MRN:   [id number]   PCP:  Maryan Laguna MD    Date:  2017     IMPRESSION/PLAN:    ICM, EF 25%, home inotrope, NYHA class II-III  Continue milrinone, Coreg, and Spironolactone   Increase Entresto 49/51 1 tab po BID   Change Bumex to prn  Check labs in 2 weeks  Pt to continue daily weights, low sodium diet and fluid consumption to 2 L/daily  Return to [facility] in 4 weeks    HTN  Increase Entresto 49/51 1 tab po BID  Cont. Coreg at current dose    CAD s/p PCI  Continue ASA, Plavix, Coreg and statin per cardiology          Claudication  ABIs c/w moderate PAD bilaterally   Refer to vascular surgery if symptoms worsen    CKD  Followed by renal  Stable, repeat labs in 2 weeks on higher dose of Entresto    PAF  Continue amiodarone and Coreg - managed per cardiology    H/O lower GIB  Will need colonic polyp and AVM treated at some point - denies any bleeding issues  Will arrange for f/u once off Plavix    H/O Tobacco Abuse  Pt denies smoking  Reinforced importance of smoking cessation    Diabetes  Insulin management by PCP    Diabetic neuropathy  He states neuropathy has resolved and he no longer requires neurontin  Cymbalta 30 mg daily        Hepatitis C  Undetectable viral load, needs second HCV RNA - will repeat with next lab draw per Dr. Parker recommendations. Subjective:     Mr. Gary Hackett is a 60 yo, C male w/ PMH CAD s/p PCI to left main and  of RCA 0n 17,  ICM (LVEF 10%), h/o PAF, s/p ICD, h/o Tob abuse, Hep C (undetectable viral load), h/o GIB w/ colonic polyp and AVM, who presents today for follow up. He reports overall he feels great. He is cleaning around the house and reports he is very active. He had his ICD placed a few weeks ago. He denies any shocks from it. He is sleeping on one pillow and denies orthopnea. He is performing daily weights. His weight is stable.   He has a healthy appetite. He is watching his salt intakes and reading labels. He is drinking about 64 ounces a day. He is compliant with all his medications. He denies any tobacco use. He denies any alcohol or illicit drug use. He has stopped taking the gabapentin - he states he doesn't need it anymore. ROS  Pt c/o headaches, cough productive clear sputum, diarrhea, claudication w/ \"great distance\". Pt denies fatigue, fevers, chills, diaphoresis, lightheadedness, dizziness, syncope, N/V, changes to appetite, changes to weight, early satiety, CP, palpitations, SOB, orthopnea, PND, constipation, edema, abdominal fullness, depression or anxiety, hematuria, BRBPR, melena. Objective:     Visit Vitals    /70 (BP 1 Location: Right arm, BP Patient Position: Sitting)    Pulse 87    Temp 97.7 °F (36.5 °C)    Resp 16    Ht 5' 10\" (1.778 m)    Wt 176 lb 3.2 oz (79.9 kg)    SpO2 98%    BMI 25.28 kg/m2      Physical Exam  Gen:   WA, WN, NAD  HEENT:  EOMI,   Neck:   supple, trache midline, (-) JVD  CVS:      S1/S2  Pul:  CTA b/l (-) r,r,w  Abd:  +BS, soft, NT  Ext:  (-) edema, L PICC line site (-) erythema (-) swelling  Neuro:   No obvious deficits  ICD site R SCV:  Inc c/d/i, healing well, (-( erythema           Past History:     Past Medical History:   Diagnosis Date    Cardiomyopathy ([facility] [address] 1/20/2017    A. Echo (1/19/17):  EF 5-10% with severe GHK,. Mildly dil LA. Mild TR. PASP 46.    Chronic renal insufficiency 3/6/2017    Diabetes mellitus ([address] 3/6/2017    Dyslipidemia 3/6/2017    A. FLP (1/19/17): Tot 120, , HDL 19, LDL 76 (no Rx).  H/O: GI bleed 3/6/2017    Hepatitis C 3/6/2017    Paroxysmal atrial fibrillation ([address] 3/6/2017     No family history on file. Past Surgical History:   Procedure Laterality Date    COLONOSCOPY N/A 2/17/2017    COLONOSCOPY performed by Kamryn Perry.  Augustin Olsen MD at [facility] ENDOSCOPY       Social History   Substance Use Topics    Smoking status: Former Smoker Packs/day: 2.00     Years: 37.1     Quit date: 1/20/2017    Smokeless tobacco: Never Used    Alcohol use No        No family history on file. Allergies: Allergies   Allergen Reactions    Heparin (Porcine) Unknown (comments)        Data Review:     Medications reviewed:    Current Outpatient Prescriptions   Medication Sig    acetaminophen (TYLENOL EXTRA STRENGTH) 500 mg tablet Take 1,000 mg by mouth every six (6) hours as needed for Pain. Indications: BACK PAIN    bumetanide (BUMEX) 2 mg tablet Take 1 mg by mouth every other day.  insulin NPH/insulin regular (NOVOLIN 70/30, HUMULIN 70/30) 100 unit/mL (70-30) injection Take 10 units in the morning and 8 units in the evening.  amiodarone (CORDARONE) 200 mg tablet Take 1 Tab by mouth every twelve (12) hours.  spironolactone (ALDACTONE) 25 mg tablet Take 1 Tab by mouth daily.  carvedilol (COREG) 3.125 mg tablet Take 1 Tab by mouth two (2) times daily (with meals).  atorvastatin (LIPITOR) 10 mg tablet Take 1 Tab by mouth daily.  clopidogrel (PLAVIX) 75 mg tab Take 1 Tab by mouth daily.  DULoxetine (CYMBALTA) 30 mg capsule Take 1 Cap by mouth daily.  sacubitril-valsartan (ENTRESTO) 24 mg/26 mg tablet Take 1 Tab by mouth two (2) times a day.  aspirin delayed-release 81 mg tablet Take 1 Tab by mouth daily.  magnesium oxide (MAG-OX) 400 mg tablet Take 1 Tab by mouth daily.  milrinone (PRIMACOR) 20 mg/100 mL (200 mcg/mL) infusion 28.425 mcg/min by IntraVENous route continuous.  Insulin Syringes, Disposable, 1 mL syrg Pt to take 10 units w/ breakfast and 8 units w/ dinner    loratadine (CLARITIN) 10 mg tablet Take 10 mg by mouth daily. No current facility-administered medications for this visit. Follow-up Disposition:  Return in about 4 weeks (around 7/25/2017).       Thank you for letting us see Mr Javier Chung with you,    KENYETTA Adams [address]

## 2017-06-27 NOTE — PROGRESS NOTES
[facility] Clinic Note      NAME:  Maurice Valenzuela. :   1950   MRN:   [id number]   PCP:  Luz Marina Ojeda MD    Date:  2017     IMPRESSION/PLAN:    ICM, EF 25%, home inotrope, NYHA class II-III  Continue milrinone, Coreg, and Spironolactone   Increase Entresto 49/51 1 tab po BID   Will attempt to wean milrinone once on max dose of Entresto  Change Bumex to prn  Check labs in 2 weeks  Pt to continue daily weights, low sodium diet and fluid consumption to 2 L/daily  Return to [facility] in 4 weeks    HTN  Increase Entresto 49/51 1 tab po BID  Cont. Coreg at current dose    CAD s/p PCI  Continue ASA, Plavix, Coreg and statin per cardiology          Claudication  ABIs c/w moderate PAD bilaterally   Refer to vascular surgery if symptoms worsen    CKD  Followed by renal  Stable, repeat labs in 2 weeks on higher dose of Entresto    PAF  Continue amiodarone and Coreg - managed per cardiology    H/O lower GIB  Will need colonic polyp and AVM treated at some point - denies any bleeding issues  Will arrange for f/u once off Plavix    H/O Tobacco Abuse  Pt denies smoking  Reinforced importance of smoking cessation    Diabetes  Insulin management by PCP    Diabetic neuropathy  He states neuropathy has resolved and he no longer requires neurontin  Cymbalta 30 mg daily        Hepatitis C  Undetectable viral load, needs second HCV RNA - will repeat with next lab draw per Dr. Milton Veliz recommendations. Subjective:     Mr. Honey Miller is a 62 yo, C male w/ PMH CAD s/p PCI to left main and  of RCA 0n 17,  ICM (LVEF 10%), h/o PAF, s/p ICD, h/o Tob abuse, Hep C (undetectable viral load), h/o GIB w/ colonic polyp and AVM, who presents today for follow up. He reports overall he feels great. He is cleaning around the house and reports he is very active. He had his ICD placed a few weeks ago. He denies any shocks from it. He is sleeping on one pillow and denies orthopnea.     He is performing daily weights. His weight is stable. He has a healthy appetite. He is watching his salt intakes and reading labels. He is drinking about 64 ounces a day. He is compliant with all his medications. He denies any tobacco use. He denies any alcohol or illicit drug use. He has stopped taking the gabapentin - he states he doesn't need it anymore. ROS  Pt c/o headaches, cough productive clear sputum, diarrhea, claudication w/ \"great distance\". Pt denies fatigue, fevers, chills, diaphoresis, lightheadedness, dizziness, syncope, N/V, changes to appetite, changes to weight, early satiety, CP, palpitations, SOB, orthopnea, PND, constipation, edema, abdominal fullness, depression or anxiety, hematuria, BRBPR, melena. Objective:     Visit Vitals    /70 (BP 1 Location: Right arm, BP Patient Position: Sitting)    Pulse 87    Temp 97.7 °F (36.5 °C)    Resp 16    Ht 5' 10\" (1.778 m)    Wt 176 lb 3.2 oz (79.9 kg)    SpO2 98%    BMI 25.28 kg/m2      Physical Exam  Gen:   WA, WN, NAD  HEENT:  EOMI,   Neck:   supple, trache midline, (-) JVD  CVS:      S1/S2  Pul:  CTA b/l (-) r,r,w  Abd:  +BS, soft, NT  Ext:  (-) edema, L PICC line site (-) erythema (-) swelling  Neuro:   No obvious deficits  ICD site R SCV:  Inc c/d/i, healing well, (-( erythema           Past History:     Past Medical History:   Diagnosis Date    Cardiomyopathy ([facility] 1/20/2017    A. Echo (1/19/17):  EF 5-10% with severe GHK,. Mildly dil LA. Mild TR. PASP 46.    Chronic renal insufficiency 3/6/2017    Diabetes mellitus ([address] 3/6/2017    Dyslipidemia 3/6/2017    A. FLP (1/19/17): Tot 120, , HDL 19, LDL 76 (no Rx).  H/O: GI bleed 3/6/2017    Hepatitis C 3/6/2017    Paroxysmal atrial fibrillation ([address] 3/6/2017     No family history on file. Past Surgical History:   Procedure Laterality Date    COLONOSCOPY N/A 2/17/2017    COLONOSCOPY performed by Lian Salter.  Aditi Angela MD at [facility] History   Substance Use Topics    Smoking status: Former Smoker     Packs/day: 2.00     Years: 45.00     Quit date: 1/20/2017    Smokeless tobacco: Never Used    Alcohol use No        No family history on file. Allergies: Allergies   Allergen Reactions    Heparin (Porcine) Unknown (comments)        Data Review:     Medications reviewed:    Current Outpatient Prescriptions   Medication Sig    acetaminophen (TYLENOL EXTRA STRENGTH) 500 mg tablet Take 1,000 mg by mouth every six (6) hours as needed for Pain. Indications: BACK PAIN    bumetanide (BUMEX) 2 mg tablet Take 1 mg by mouth every other day.  insulin NPH/insulin regular (NOVOLIN 70/30, HUMULIN 70/30) 100 unit/mL (70-30) injection Take 10 units in the morning and 8 units in the evening.  amiodarone (CORDARONE) 200 mg tablet Take 1 Tab by mouth every twelve (12) hours.  spironolactone (ALDACTONE) 25 mg tablet Take 1 Tab by mouth daily.  carvedilol (COREG) 3.125 mg tablet Take 1 Tab by mouth two (2) times daily (with meals).  atorvastatin (LIPITOR) 10 mg tablet Take 1 Tab by mouth daily.  clopidogrel (PLAVIX) 75 mg tab Take 1 Tab by mouth daily.  DULoxetine (CYMBALTA) 30 mg capsule Take 1 Cap by mouth daily.  sacubitril-valsartan (ENTRESTO) 24 mg/26 mg tablet Take 1 Tab by mouth two (2) times a day.  aspirin delayed-release 81 mg tablet Take 1 Tab by mouth daily.  magnesium oxide (MAG-OX) 400 mg tablet Take 1 Tab by mouth daily.  milrinone (PRIMACOR) 20 mg/100 mL (200 mcg/mL) infusion 28.425 mcg/min by IntraVENous route continuous.  Insulin Syringes, Disposable, 1 mL syrg Pt to take 10 units w/ breakfast and 8 units w/ dinner    loratadine (CLARITIN) 10 mg tablet Take 10 mg by mouth daily. No current facility-administered medications for this visit. Follow-up Disposition:  Return in about 4 weeks (around 7/25/2017).      Pt seen under the direct supervision of Dr. Micah Wharton      Thank you for letting us see Mr Echeverria Shearing with you,    Jerrel Goodell, PA-C [address]

## 2017-06-27 NOTE — PATIENT INSTRUCTIONS
1.  Increase Entresto 49/51 1 tablet twice a day. 2.  Continue Coreg and Aldactone. 3.  Change Bumex to as needed. Contact the office if you develop swelling, increase in your weight or shortness of breath    4. Continue daily weights, low sodium diet and watching fluid intake    5. Daily exercise. 6.  Return to [place] [address] in 4 weeks.

## 2017-06-28 ENCOUNTER — TELEPHONE (OUTPATIENT)
Dept: CARDIOLOGY CLINIC | Age: 67
End: 2017-06-28

## 2017-06-28 RX ORDER — ATORVASTATIN CALCIUM 10 MG/1
TABLET, FILM COATED ORAL
Qty: 30 TAB | Refills: 0 | Status: SHIPPED | OUTPATIENT
Start: 2017-06-28 | End: 2017-07-21 | Stop reason: SDUPTHER

## 2017-06-28 RX ORDER — CLOPIDOGREL BISULFATE 75 MG/1
TABLET ORAL
Qty: 30 TAB | Refills: 0 | Status: SHIPPED | OUTPATIENT
Start: 2017-06-28 | End: 2017-07-21 | Stop reason: SDUPTHER

## 2017-06-28 RX ORDER — SPIRONOLACTONE 25 MG/1
TABLET ORAL
Qty: 30 TAB | Refills: 0 | Status: SHIPPED | OUTPATIENT
Start: 2017-06-28 | End: 2017-07-12 | Stop reason: DRUGHIGH

## 2017-06-28 RX ORDER — CARVEDILOL 3.12 MG/1
TABLET ORAL
Qty: 60 TAB | Refills: 0 | Status: SHIPPED | OUTPATIENT
Start: 2017-06-28 | End: 2017-07-21 | Stop reason: SDUPTHER

## 2017-06-28 RX ORDER — AMIODARONE HYDROCHLORIDE 200 MG/1
TABLET ORAL
Qty: 60 TAB | Refills: 0 | Status: SHIPPED | OUTPATIENT
Start: 2017-06-28 | End: 2017-07-21 | Stop reason: SDUPTHER

## 2017-06-28 NOTE — TELEPHONE ENCOUNTER
Patient identified with 2 identifiers  Patient needing refills on plavix, cordarone, lipitor, I gave him number to Dr. Keisha Beard to request these refills. He will call back if he continues to have difficulty.

## 2017-06-28 NOTE — TELEPHONE ENCOUNTER
Patient has been out of these medications for 2 days now.      Pharmacy verified    90 day supply with refills     Thank you, Re Hernandez

## 2017-06-29 ENCOUNTER — HOME CARE VISIT (OUTPATIENT)
Dept: SCHEDULING | Facility: HOME HEALTH | Age: 67
End: 2017-06-29

## 2017-06-29 PROCEDURE — G0299 HHS/HOSPICE OF RN EA 15 MIN: HCPCS

## 2017-06-30 ENCOUNTER — TELEPHONE (OUTPATIENT)
Dept: CARDIOLOGY CLINIC | Age: 67
End: 2017-06-30

## 2017-06-30 VITALS
BODY MASS INDEX: 24.39 KG/M2 | RESPIRATION RATE: 18 BRPM | SYSTOLIC BLOOD PRESSURE: 118 MMHG | DIASTOLIC BLOOD PRESSURE: 64 MMHG | OXYGEN SATURATION: 98 % | WEIGHT: 170 LBS | HEART RATE: 90 BPM | TEMPERATURE: 98.9 F

## 2017-07-05 ENCOUNTER — TELEPHONE (OUTPATIENT)
Dept: CARDIOLOGY CLINIC | Age: 67
End: 2017-07-05

## 2017-07-05 NOTE — TELEPHONE ENCOUNTER
----- Message from Moraima Calderon NP sent at 7/5/2017  4:21 PM EDT -----  Regarding: RE: [facility]  CMP please!     ----- Message -----     From: Mary Vega RN     Sent: 7/5/2017  10:53 AM       To: Moraima Calderon NP  Subject: FW: [facility]                          What labs would you like me to order on him?    ----- Message -----     From: Jana Wright     Sent: 3/8/4825  10:19 AM       To: Mary Vega RN  Subject: Charlett Carmen labs                              Hi Daisha,    Could you please call Silva Obregon back with [facility] to discuss lab draws on Mr. Denice Clark for tomorrow when she goes to his home? Silva Obregon can be reached at [phone]. Thank you,  Johnie Currie       Returned call to Silva Obregon, she states phone call on 6-23-17 to [facility] states she is to draw weekly CBC, CMP and PRO-BNP. She will continue this until told otherwise. Results found in media-she will fax tomorrow.

## 2017-07-06 ENCOUNTER — HOME CARE VISIT (OUTPATIENT)
Dept: SCHEDULING | Facility: HOME HEALTH | Age: 67
End: 2017-07-06

## 2017-07-06 VITALS
TEMPERATURE: 99 F | RESPIRATION RATE: 18 BRPM | WEIGHT: 170 LBS | BODY MASS INDEX: 24.39 KG/M2 | HEART RATE: 79 BPM | DIASTOLIC BLOOD PRESSURE: 74 MMHG | SYSTOLIC BLOOD PRESSURE: 124 MMHG | OXYGEN SATURATION: 98 %

## 2017-07-06 PROCEDURE — G0299 HHS/HOSPICE OF RN EA 15 MIN: HCPCS

## 2017-07-07 ENCOUNTER — HOME CARE VISIT (OUTPATIENT)
Dept: HOME HEALTH SERVICES | Facility: HOME HEALTH | Age: 67
End: 2017-07-07

## 2017-07-10 DIAGNOSIS — I25.5 ISCHEMIC CARDIOMYOPATHY: ICD-10-CM

## 2017-07-10 DIAGNOSIS — I48.0 PAROXYSMAL ATRIAL FIBRILLATION (HCC): Primary | ICD-10-CM

## 2017-07-11 ENCOUNTER — HOME CARE VISIT (OUTPATIENT)
Dept: SCHEDULING | Facility: HOME HEALTH | Age: 67
End: 2017-07-11

## 2017-07-11 PROCEDURE — G0299 HHS/HOSPICE OF RN EA 15 MIN: HCPCS

## 2017-07-12 ENCOUNTER — CLINICAL SUPPORT (OUTPATIENT)
Dept: CARDIOLOGY CLINIC | Age: 67
End: 2017-07-12

## 2017-07-12 ENCOUNTER — OFFICE VISIT (OUTPATIENT)
Dept: CARDIOLOGY CLINIC | Age: 67
End: 2017-07-12

## 2017-07-12 ENCOUNTER — TELEPHONE (OUTPATIENT)
Dept: CARDIOLOGY CLINIC | Age: 67
End: 2017-07-12

## 2017-07-12 VITALS
OXYGEN SATURATION: 96 % | DIASTOLIC BLOOD PRESSURE: 62 MMHG | RESPIRATION RATE: 16 BRPM | HEART RATE: 76 BPM | HEIGHT: 70 IN | BODY MASS INDEX: 26.63 KG/M2 | WEIGHT: 186 LBS | SYSTOLIC BLOOD PRESSURE: 110 MMHG

## 2017-07-12 VITALS
WEIGHT: 172 LBS | DIASTOLIC BLOOD PRESSURE: 82 MMHG | HEART RATE: 84 BPM | TEMPERATURE: 99.2 F | SYSTOLIC BLOOD PRESSURE: 132 MMHG | BODY MASS INDEX: 24.68 KG/M2 | OXYGEN SATURATION: 99 % | RESPIRATION RATE: 18 BRPM

## 2017-07-12 DIAGNOSIS — I48.0 PAROXYSMAL ATRIAL FIBRILLATION (HCC): ICD-10-CM

## 2017-07-12 DIAGNOSIS — Z95.810 ICD (IMPLANTABLE CARDIOVERTER-DEFIBRILLATOR), SINGLE, IN SITU: ICD-10-CM

## 2017-07-12 DIAGNOSIS — Z95.810 CARDIAC DEFIBRILLATOR IN SITU: Primary | ICD-10-CM

## 2017-07-12 DIAGNOSIS — Z95.810 ICD (IMPLANTABLE CARDIOVERTER-DEFIBRILLATOR) IN PLACE: Primary | ICD-10-CM

## 2017-07-12 RX ORDER — SPIRONOLACTONE 25 MG/1
25 TABLET ORAL DAILY
COMMUNITY
End: 2017-08-22 | Stop reason: SDUPTHER

## 2017-07-12 NOTE — PROGRESS NOTES
HISTORY OF PRESENTING ILLNESS      Gemini Merchant Jr. is a 61 y. o. male with ICM, LVEF 25%, CKD, PAF (Hx GIB), DM, Hepatitis C, CAD s/p  PCI whose LVEF has failed to improve post-revascularization. He is currently on milrinone for NYHA class III symptoms. He underwent implantation of an ICD and now presents for follow up post-procedure. He is doing well without complaints. ACTIVE PROBLEM LIST     Patient Active Problem List    Diagnosis Date Noted    Paroxysmal atrial fibrillation ([address] 03/06/2017    H/O: GI bleed 03/06/2017    Diabetes mellitus ([address] 03/06/2017    Hepatitis C 03/06/2017    Chronic renal insufficiency 03/06/2017    Dyslipidemia 03/06/2017    Ischemic cardiomyopathy 01/20/2017           PAST MEDICAL HISTORY     Past Medical History:   Diagnosis Date    Cardiomyopathy ([facility] 1/20/2017    A. Echo (1/19/17):  EF 5-10% with severe GHK,. Mildly dil LA. Mild TR. PASP 46.    Chronic renal insufficiency 3/6/2017    Diabetes mellitus ([address] 3/6/2017    Dyslipidemia 3/6/2017    A. FLP (1/19/17): Tot 120, , HDL 19, LDL 76 (no Rx).  H/O: GI bleed 3/6/2017    Hepatitis C 3/6/2017    Paroxysmal atrial fibrillation ([address] 3/6/2017           PAST SURGICAL HISTORY     Past Surgical History:   Procedure Laterality Date    COLONOSCOPY N/A 2/17/2017    COLONOSCOPY performed by Jeremy Pressley. Rivka Lomax MD at [facility] ENDOSCOPY          ALLERGIES     Allergies   Allergen Reactions    Heparin (Porcine) Unknown (comments)          FAMILY HISTORY     No family history on file.  negative for cardiac disease       SOCIAL HISTORY     Social History     Social History    Marital status:      Spouse name: N/A    Number of children: N/A    Years of education: N/A     Social History Main Topics    Smoking status: Former Smoker     Packs/day: 2.00     Years: 45.00     Quit date: 1/20/2017    Smokeless tobacco: Never Used    Alcohol use No    Drug use: Not on file    Sexual activity: Yes     Partners: Female     Other Topics Concern    Not on file     Social History Narrative         MEDICATIONS     Current Outpatient Prescriptions   Medication Sig    bumetanide (BUMEX) 1 mg tablet Take 1 mg by mouth as needed.  spironolactone (ALDACTONE) 25 mg tablet Take 12.5 mg by mouth daily.  sodium chloride (NORMAL SALINE FLUSH) 0.9 % 5-10 mL by IntraVENous Push route as needed.  spironolactone (ALDACTONE) 25 mg tablet TAKE ONE TABLET BY MOUTH ONCE DAILY (Patient not taking: No sig reported)    atorvastatin (LIPITOR) 10 mg tablet TAKE ONE TABLET BY MOUTH ONCE DAILY    clopidogrel (PLAVIX) 75 mg tab TAKE ONE TABLET BY MOUTH ONCE DAILY    amiodarone (CORDARONE) 200 mg tablet TAKE ONE TABLET BY MOUTH EVERY 12 HOURS    carvedilol (COREG) 3.125 mg tablet TAKE ONE TABLET BY MOUTH TWICE DAILY WITH MEALS    sacubitril-valsartan (ENTRESTO) 49 mg/51 mg tablet Take 1 Tab by mouth two (2) times a day.  acetaminophen (TYLENOL EXTRA STRENGTH) 500 mg tablet Take 1,000 mg by mouth every six (6) hours as needed for Pain. Indications: BACK PAIN    insulin NPH/insulin regular (NOVOLIN 70/30, HUMULIN 70/30) 100 unit/mL (70-30) injection Take 10 units in the morning and 8 units in the evening.  loratadine (CLARITIN) 10 mg tablet Take 10 mg by mouth daily.  DULoxetine (CYMBALTA) 30 mg capsule Take 1 Cap by mouth daily.  aspirin delayed-release 81 mg tablet Take 1 Tab by mouth daily.  magnesium oxide (MAG-OX) 400 mg tablet Take 1 Tab by mouth daily.  milrinone (PRIMACOR) 20 mg/100 mL (200 mcg/mL) infusion 28.425 mcg/min by IntraVENous route continuous.  Insulin Syringes, Disposable, 1 mL syrg Pt to take 10 units w/ breakfast and 8 units w/ dinner     No current facility-administered medications for this visit. I have reviewed the nurses notes, vitals, problem list, allergy list, medical history, family, social history and medications.        REVIEW OF SYMPTOMS      General: Pt denies excessive weight gain or loss. Pt is able to conduct ADL's  HEENT: Denies blurred vision, headaches, hearing loss, epistaxis and difficulty swallowing. Respiratory: Denies cough, congestion, shortness of breath, NAVARRO, wheezing or stridor. Cardiovascular: Denies precordial pain, palpitations, edema or PND  Gastrointestinal: Denies poor appetite, indigestion, abdominal pain or blood in stool  Genitourinary: Denies hematuria, dysuria, increased urinary frequency  Musculoskeletal: Denies joint pain or swelling from muscles or joints  Neurologic: Denies tremor, paresthesias, headache, or sensory motor disturbance  Psychiatric: Denies confusion, insomnia, depression  Integumentray: Denies rash, itching or ulcers. Hematologic: Denies easy bruising, bleeding       PHYSICAL EXAMINATION      There were no vitals filed for this visit. General: Well developed, in no acute distress. HEENT: No jaundice, oral mucosa moist, no oral ulcers  Neck: Supple, no stiffness, no lymphadenopathy, supple  Heart:  Normal S1/S2 negative S3 or S4. Regular, no murmur, gallop or rub, no jugular venous distention  Respiratory: Clear bilaterally x 4, no wheezing or rales  Abdomen:   Soft, non-tender, bowel sounds are active.   Extremities:  No edema, normal cap refill, no cyanosis. Musculoskeletal: No clubbing, no deformities  Neuro: A&Ox3, speech clear, gait stable, cooperative, no focal neurologic deficits  Skin: Skin color is normal. No rashes or lesions.  Non diaphoretic, moist.  Vascular: 2+ pulses symmetric in all extremities       DIAGNOSTIC DATA      EKG: sinus rhythm with LBBB       LABORATORY DATA      Lab Results   Component Value Date/Time    WBC 7.7 06/12/2017 01:17 PM    HGB 10.8 06/12/2017 01:17 PM    HCT 33.2 06/12/2017 01:17 PM    PLATELET 126 51/43/1858 01:17 PM    MCV 81.8 06/12/2017 01:17 PM      Lab Results   Component Value Date/Time    Sodium 140 05/22/2017 12:00 AM    Potassium 4.6 05/22/2017 12:00 AM    Chloride 98 05/22/2017 12:00 AM    CO2 21 05/22/2017 12:00 AM    Anion gap 9 02/22/2017 03:16 AM    Glucose 157 05/22/2017 12:00 AM    BUN 41 05/22/2017 12:00 AM    Creatinine 1.58 05/22/2017 12:00 AM    BUN/Creatinine ratio 26 05/22/2017 12:00 AM    GFR est AA 53 05/22/2017 12:00 AM    GFR est non-AA 46 05/22/2017 12:00 AM    Calcium 9.2 05/22/2017 12:00 AM    Bilirubin, total 0.5 02/22/2017 03:16 AM    AST (SGOT) 14 02/22/2017 03:16 AM    Alk. phosphatase 99 02/22/2017 03:16 AM    Protein, total 7.3 02/22/2017 03:16 AM    Albumin 2.9 02/22/2017 03:16 AM    Globulin 4.4 02/22/2017 03:16 AM    A-G Ratio 0.7 02/22/2017 03:16 AM    ALT (SGPT) 21 02/22/2017 03:16 AM           ASSESSMENT      1. Cardiomyopathy     A. Ischemic   B. NYHA class III  2. Atrial fibrillation   A. Paroxysmal  3. CAD, native  4. Percutaneous transluminal angioplasty  5. ICD    A. [address] Single chamber       PLAN     Continue with current medical therapy an remote device downloads. FOLLOW-UP   1 year    Thank you, Nisha Brice MD and Dr. Richie De La Rosa for allowing me to participate in the care of this extraordinarily pleasant male. Please do not hesitate to contact me for further questions/concerns.      MONICA Xiao MD  Cardiac Electrophysiology / Cardiology    [address] [place] [address], [address], [address] [place] [address], [facility]  [phone] / [phone] Fax   [phone] / [phone] Fax

## 2017-07-12 NOTE — PROGRESS NOTES
Visit Vitals    /62 (BP 1 Location: Right arm, BP Patient Position: Sitting)    Pulse 76    Resp 16    Ht 5' 10\" (1.778 m)    Wt 186 lb (84.4 kg)    SpO2 96%    BMI 26.69 kg/m2

## 2017-07-12 NOTE — PROGRESS NOTES
A thorough screening of the patient chart was completed to identify inclusion/exclusion criteria per protocol. No exclusions were noted and the patient meets study enrollment requirements to the best of my knowledge. The consent and study expectations were reviewed with the patient and all questions and concerns were addressed. The patient was given as much time as needed to review the consent form and make an informed decision. The patient is willing to participate in the study and has signed the consent form. A copy was provided to the patient and will be scanned into the medical record. The patient is willing to comply with follow-up evaluations as outlined by the study protocol. No study procedures were initiated prior to consent.

## 2017-07-13 ENCOUNTER — TELEPHONE (OUTPATIENT)
Dept: CARDIOLOGY CLINIC | Age: 67
End: 2017-07-13

## 2017-07-13 ENCOUNTER — HOME CARE VISIT (OUTPATIENT)
Dept: SCHEDULING | Facility: HOME HEALTH | Age: 67
End: 2017-07-13

## 2017-07-13 DIAGNOSIS — I50.22 SYSTOLIC CHF, CHRONIC (HCC): Primary | ICD-10-CM

## 2017-07-13 NOTE — TELEPHONE ENCOUNTER
Received call from patient inquiring about lab results. Reviewed BMP with him - notable for improvement in Cr. Will resume lower dose Entresto, 24/26 mg PO BID. Patient to  samples today. Will repeat labs in 1 week.

## 2017-07-19 ENCOUNTER — TELEPHONE (OUTPATIENT)
Dept: CARDIOLOGY CLINIC | Age: 67
End: 2017-07-19

## 2017-07-19 NOTE — TELEPHONE ENCOUNTER
Pt called to see when his next appointment was scheduled - provided him with appointment date and time.

## 2017-07-20 ENCOUNTER — HOME CARE VISIT (OUTPATIENT)
Dept: SCHEDULING | Facility: HOME HEALTH | Age: 67
End: 2017-07-20

## 2017-07-20 PROCEDURE — G0299 HHS/HOSPICE OF RN EA 15 MIN: HCPCS

## 2017-07-21 ENCOUNTER — TELEPHONE (OUTPATIENT)
Dept: CARDIOLOGY CLINIC | Age: 67
End: 2017-07-21

## 2017-07-21 ENCOUNTER — DOCUMENTATION ONLY (OUTPATIENT)
Dept: CARDIOLOGY CLINIC | Age: 67
End: 2017-07-21

## 2017-07-21 VITALS
HEART RATE: 88 BPM | RESPIRATION RATE: 18 BRPM | TEMPERATURE: 98.8 F | SYSTOLIC BLOOD PRESSURE: 118 MMHG | WEIGHT: 169 LBS | OXYGEN SATURATION: 98 % | BODY MASS INDEX: 24.25 KG/M2 | DIASTOLIC BLOOD PRESSURE: 66 MMHG

## 2017-07-21 DIAGNOSIS — I50.22 SYSTOLIC CHF, CHRONIC (HCC): ICD-10-CM

## 2017-07-21 RX ORDER — CARVEDILOL 3.12 MG/1
TABLET ORAL
Qty: 60 TAB | Refills: 4 | Status: SHIPPED | OUTPATIENT
Start: 2017-07-21 | End: 2018-01-09 | Stop reason: SDUPTHER

## 2017-07-21 RX ORDER — ATORVASTATIN CALCIUM 10 MG/1
TABLET, FILM COATED ORAL
Qty: 30 TAB | Refills: 4 | Status: SHIPPED | OUTPATIENT
Start: 2017-07-21 | End: 2018-01-09 | Stop reason: SDUPTHER

## 2017-07-21 RX ORDER — CLOPIDOGREL BISULFATE 75 MG/1
75 TABLET ORAL DAILY
Qty: 30 TAB | Refills: 4 | Status: SHIPPED | OUTPATIENT
Start: 2017-07-21 | End: 2018-01-09 | Stop reason: SDUPTHER

## 2017-07-21 RX ORDER — AMIODARONE HYDROCHLORIDE 200 MG/1
TABLET ORAL
Qty: 60 TAB | Refills: 4 | Status: SHIPPED | OUTPATIENT
Start: 2017-07-21 | End: 2018-01-11 | Stop reason: SDUPTHER

## 2017-07-24 ENCOUNTER — TELEPHONE (OUTPATIENT)
Dept: CARDIOLOGY CLINIC | Age: 67
End: 2017-07-24

## 2017-07-24 NOTE — TELEPHONE ENCOUNTER
Telephone Call RE:  Appointment reminder     Outcome:     [] Patient confirmed appointment   [] Patient rescheduled appointment for    [] Unable to reach   [x] Left message              [] Other:       Ashli Combs

## 2017-07-25 ENCOUNTER — OFFICE VISIT (OUTPATIENT)
Dept: CARDIOLOGY CLINIC | Age: 67
End: 2017-07-25

## 2017-07-25 VITALS
HEIGHT: 70 IN | HEART RATE: 90 BPM | RESPIRATION RATE: 16 BRPM | BODY MASS INDEX: 23.91 KG/M2 | OXYGEN SATURATION: 98 % | SYSTOLIC BLOOD PRESSURE: 106 MMHG | TEMPERATURE: 98.3 F | WEIGHT: 167 LBS | DIASTOLIC BLOOD PRESSURE: 72 MMHG

## 2017-07-25 DIAGNOSIS — I50.22 SYSTOLIC CHF, CHRONIC (HCC): Primary | ICD-10-CM

## 2017-07-25 DIAGNOSIS — R76.8 HCV ANTIBODY POSITIVE: ICD-10-CM

## 2017-07-25 DIAGNOSIS — Z95.810 ICD (IMPLANTABLE CARDIOVERTER-DEFIBRILLATOR) IN PLACE: ICD-10-CM

## 2017-07-25 DIAGNOSIS — N18.9 CHRONIC RENAL INSUFFICIENCY, UNSPECIFIED STAGE: ICD-10-CM

## 2017-07-25 DIAGNOSIS — I73.9 CLAUDICATION (HCC): ICD-10-CM

## 2017-07-25 DIAGNOSIS — I25.5 ISCHEMIC CARDIOMYOPATHY: ICD-10-CM

## 2017-07-25 RX ORDER — MILRINONE LACTATE 0.2 MG/ML
0.2 INJECTION, SOLUTION INTRAVENOUS CONTINUOUS
Qty: 100 ML | Refills: 1 | Status: SHIPPED | OUTPATIENT
Start: 2017-07-25 | End: 2017-07-25 | Stop reason: SDUPTHER

## 2017-07-25 RX ORDER — MILRINONE LACTATE 0.2 MG/ML
0.3 INJECTION, SOLUTION INTRAVENOUS CONTINUOUS
Qty: 100 ML | Refills: 1 | Status: SHIPPED | OUTPATIENT
Start: 2017-07-25 | End: 2017-08-25 | Stop reason: SDUPTHER

## 2017-07-25 NOTE — PATIENT INSTRUCTIONS
We will decrease the rate of your milrinone to 0.2 mcg/kg/min. Please begin taking Veltassa, 1 packet each morning. Continue your other medications as prescribed. Stop taking your magnesium for now. Please stay hydrated. We will set you up for a sleep consultation. Follow up in 2 weeks for a follow up visit.

## 2017-07-25 NOTE — MR AVS SNAPSHOT
Visit Information Date & Time Provider Department Dept. Phone Encounter #  
 7/25/2017  2:00 PM Dodie Olmos MD [address] [id number] Your Appointments 9/18/2017  2:20 PM  
ESTABLISHED PATIENT with Arash Tao MD  
[facility] ([facility] SCHEDULING) Appt Note: 5 mo fup; 5 mo [place]  [address] [facility]  
[phone]  
  
   
 [address] [place] [address]  
  
    
 7/25/2018 10:00 AM  
ESTABLISHED PATIENT with Konrad Guerrero MD  
[facility] ([address]) Appt Note: annual  
 [address] [facility] [address] [facility]  
[address] [address] [address] Upcoming Health Maintenance Date Due  
 FOOT EXAM Q1 11/23/1962 MICROALBUMIN Q1 11/23/1962 EYE EXAM RETINAL OR DILATED Q1 11/23/1962 Pneumococcal 19-64 Medium Risk (1 of 1 - PPSV23) 11/23/1971 DTaP/Tdap/Td series (1 - Tdap) 11/23/1973 ZOSTER VACCINE AGE 60> 9/23/2012 HEMOGLOBIN A1C Q6M 7/19/2017 INFLUENZA AGE 9 TO ADULT 8/1/2017 LIPID PANEL Q1 1/19/2018 FOBT Q 1 YEAR AGE 50-75 2/16/2018 Allergies as of 7/25/2017  Review Complete On: 7/12/2017 By: Konrad Guerrero MD  
  
 Severity Noted Reaction Type Reactions Heparin (Porcine)  02/07/2017    Unknown (comments) Current Immunizations  Reviewed on 2/14/2017 No immunizations on file. Not reviewed this visit Vitals BP Pulse Temp Resp Height(growth percentile) Weight(growth percentile) 106/72 (BP 1 Location: Right arm, BP Patient Position: Sitting) 90 98.3 °F (36.8 °C) (Oral) 16 5' 10\" (1.778 m) 167 lb (75.8 kg) SpO2 BMI Smoking Status 98% 23.96 kg/m2 Former Smoker Vitals History BMI and BSA Data  Body Mass Index Body Surface Area  
 23.96 kg/m 2 1.93 m 2  
  
  
 Preferred Pharmacy Pharmacy Name Phone Replaced by [facility] [phone] Your Updated Medication List  
  
   
This list is accurate as of: 7/25/17  3:24 PM.  Always use your most recent med list.  
  
  
  
  
 amiodarone 200 mg tablet Commonly known as:  CORDARONE  
TAKE ONE TABLET BY MOUTH EVERY 12 HOURS  
  
 aspirin delayed-release 81 mg tablet Take 1 Tab by mouth daily. atorvastatin 10 mg tablet Commonly known as:  LIPITOR  
TAKE ONE TABLET BY MOUTH ONCE DAILY  
  
 bumetanide 1 mg tablet Commonly known as:  Collene Rocher Take 1 mg by mouth as needed. carvedilol 3.125 mg tablet Commonly known as:  COREG  
TAKE ONE TABLET BY MOUTH TWICE DAILY WITH MEALS  
  
 clopidogrel 75 mg Tab Commonly known as:  PLAVIX Take 1 Tab by mouth daily. DULoxetine 30 mg capsule Commonly known as:  CYMBALTA Take 1 Cap by mouth daily. insulin NPH/insulin regular 100 unit/mL (70-30) injection Commonly known as:  NOVOLIN 70/30, HUMULIN 70/30 Take 10 units in the morning and 8 units in the evening. Insulin Syringes (Disposable) 1 mL Syrg Pt to take 10 units w/ breakfast and 8 units w/ dinner  
  
 magnesium oxide 400 mg tablet Commonly known as:  MAG-OX Take 1 Tab by mouth daily. milrinone 20 mg/100 mL (200 mcg/mL) infusion Commonly known as:  PRIMACOR  
15.16 mcg/min by IntraVENous route continuous. NORMAL SALINE FLUSH 0.9 % Generic drug:  sodium chloride 5-10 mL by IntraVENous Push route as needed. sacubitril-valsartan 24-26 mg tablet Commonly known as:  ENTRESTO Take 1 Tab by mouth two (2) times a day. spironolactone 25 mg tablet Commonly known as:  ALDACTONE Take 25 mg by mouth daily. TYLENOL EXTRA STRENGTH 500 mg tablet Generic drug:  acetaminophen Take 1,000 mg by mouth every six (6) hours as needed for Pain.  Indications: BACK PAIN  
  
  
  
  
 Prescriptions Sent to Pharmacy Refills  
 milrinone (PRIMACOR) 20 mg/100 mL (200 mcg/mL) infusion 1 Sig: 15.16 mcg/min by IntraVENous route continuous. Class: Normal  
 Pharmacy: [facility] [address]  #: [phone] Route: IntraVENous To-Do List   
 07/26/2017 To Be Determined Appointment with Gill Epley, RN at [place] [address]  
  
 08/02/2017 To Be Determined Appointment with Gill Epley, RN at [place] [address]  
  
 08/09/2017 To Be Determined Appointment with Gill Epley, RN at [place] [address]  
  
 08/16/2017 To Be Determined Appointment with Gill Epley, RN at [place] [address] Patient Instructions We will decrease the rate of your milrinone to 0.2 mcg/kg/min. Please begin taking Veltassa, 1 packet each morning. Continue your other medications as prescribed. Stop taking your magnesium for now. Please stay hydrated. We will set you up for a sleep consultation. Follow up in 2 weeks for a follow up visit. Introducing \[facility] [place] [facility]\"" & [facility] SERVICES! Shweta Shah introduces Topmission patient portal. Now you can access parts of your medical record, email your doctor's office, and request medication refills online. 1. In your internet browser, go to [email or web address] 2. Click on the First Time User? Click Here link in the Sign In box. You will see the New Member Sign Up page. 3. Enter your Topmission Access Code exactly as it appears below. You will not need to use this code after youve completed the sign-up process. If you do not sign up before the expiration date, you must request a new code. · Topmission Access Code: [email or web address] Expires: 10/22/2017 12:10 PM 
 
4.  Enter the last four digits of your Social Security Number (xxxx) and Date of Birth (mm/dd/yyyy) as indicated and click Submit. You will be taken to the next sign-up page. 5. Create a Refinder by [employer] ID. This will be your [employer] login ID and cannot be changed, so think of one that is secure and easy to remember. 6. Create a [employer] password. You can change your password at any time. 7. Enter your Password Reset Question and Answer. This can be used at a later time if you forget your password. 8. Enter your e-mail address. You will receive e-mail notification when new information is available in [address] 9. Click Sign Up. You can now view and download portions of your medical record. 10. Click the Download Summary menu link to download a portable copy of your medical information. If you have questions, please visit the Frequently Asked Questions section of the [employer] website. Remember, [employer] is NOT to be used for urgent needs. For medical emergencies, dial 911. Now available from your iPhone and Android! Please provide this summary of care documentation to your next provider. Your primary care clinician is listed as MICHAEL BALLARD. If you have any questions after today's visit, please call [phone].

## 2017-07-25 NOTE — PROGRESS NOTES
[facility] Clinic Note      NAME:  Krysten Engel. :   1950   MRN:   [id number]   PCP:  Ilya Zayas MD    Date:  2017     IMPRESSION/PLAN:    ICM, EF 25%, home inotrope, NYHA class II-III  Decrease milrinone to 0.3 mcg/kg/min  Continue Coreg and Spironolactone   Continue Entresto 24/26 mg    Plans to decrease milrinone to 0.25 mcg/kg/min at next visit  Eloy Magana to allow uptitration of Entresto at next visit  Continue Bumex PRN  Check labs in 1 week  Pt to continue daily weights, low sodium diet and fluid consumption to 2 L/daily  Return to [facility] in 2 weeks    HTN  Continue Entresto 24/, Coreg    CAD s/p PCI  Continue ASA, Plavix, Coreg and statin per cardiology          Claudication  ABIs c/w moderate PAD bilaterally   Refer to vascular surgery due to bilateral foot numbness    CKD  Worsening  Repeat Thursday    PAF  Continue amiodarone and Coreg - managed per cardiology    H/O lower GIB  Will need colonic polyp and AVM treated at some point - denies any bleeding issues  Will arrange for f/u once off Plavix    H/O Tobacco Abuse  Pt denies smoking  Reinforced importance of smoking cessation    Diabetes  Insulin management by PCP    Diabetic neuropathy  He states neuropathy has resolved and he no longer requires neurontin  Cymbalta 30 mg daily        Hepatitis C  Undetectable viral load    Subjective:     Mr. Geraldine Daniel is a 62 yo, C male w/ PMH CAD s/p PCI to left main and  of RCA 0n 17,  ICM (LVEF 10%), h/o PAF, s/p ICD, h/o Tob abuse, Hep C (undetectable viral load), h/o GIB w/ colonic polyp and AVM, who presents today for follow up. He presents today for a f/u appt. He reports consistent fatigue which is the same as during his last visit. Mr Geraldine Daniel reports some new bilateral loss of sensation of his feet and persistent claudication. He has not had any troubles with his ICD and follows with Dr Elana Ch as OP.   He sleeps on one pillow and denies orthopnea. He is performing daily weights. His weight is stable. He has a healthy appetite. He is watching his salt intakes and reading labels. He is drinking about 64 ounces a day. He is compliant with all his medications. He denies any tobacco use. He denies any alcohol or illicit drug use. ROS  Pt c/o occasional diarrhea, claudication w/ \"great distance\", fatigue    Pt denies fatigue, fevers, chills, diaphoresis, lightheadedness, dizziness, syncope, N/V, changes to appetite, changes to weight, early satiety, CP, palpitations, SOB, orthopnea, PND, constipation, edema, abdominal fullness, depression or anxiety, hematuria, BRBPR, melena. Objective:     Visit Vitals    /72 (BP 1 Location: Right arm, BP Patient Position: Sitting)    Pulse 90    Temp 98.3 °F (36.8 °C) (Oral)    Resp 16    Ht 5' 10\" (1.778 m)    Wt 167 lb (75.8 kg)    SpO2 98%    BMI 23.96 kg/m2      Physical Exam  Gen:   WA, WN, NAD  HEENT:  EOMI,   Neck:   supple, trache midline, (-) JVD  CVS:      S1/S2  Pul:  CTA b/l (-) r,r,w  Abd:  +BS, soft, NT  Ext:  (-) edema, L PICC line site (-) erythema (-) swelling  Neuro:   No obvious deficits  ICD site R SCV:  Inc c/d/i, healing well, (-( erythema           Past History:     Past Medical History:   Diagnosis Date    [facility] 1/20/2017    A. Echo (1/19/17):  EF 5-10% with severe GHK,. Mildly dil LA. Mild TR. PASP 46.    Chronic renal insufficiency 3/6/2017    Diabetes mellitus ([facility] [address] 3/6/2017    Dyslipidemia 3/6/2017    A. FLP (1/19/17): Tot 120, , HDL 19, LDL 76 (no Rx).  H/O: GI bleed 3/6/2017    Hepatitis C 3/6/2017    Paroxysmal atrial fibrillation ([facility] [address] 3/6/2017     No family history on file. Past Surgical History:   Procedure Laterality Date    COLONOSCOPY N/A 2/17/2017    COLONOSCOPY performed by Rafael Reddy.  Yani Alcantara MD at [facility] ENDOSCOPY       Social History   Substance Use Topics    Smoking status: Former Smoker     Packs/day: 2.00     Years: 37.1     Quit date: 1/20/2017    Smokeless tobacco: Never Used    Alcohol use No        No family history on file. Allergies: Allergies   Allergen Reactions    Heparin (Porcine) Unknown (comments)        Data Review:     Medications reviewed:    Current Outpatient Prescriptions   Medication Sig    sacubitril-valsartan (ENTRESTO) 24 mg/26 mg tablet Take 1 Tab by mouth two (2) times a day.  clopidogrel (PLAVIX) 75 mg tab Take 1 Tab by mouth daily.  amiodarone (CORDARONE) 200 mg tablet TAKE ONE TABLET BY MOUTH EVERY 12 HOURS    atorvastatin (LIPITOR) 10 mg tablet TAKE ONE TABLET BY MOUTH ONCE DAILY    carvedilol (COREG) 3.125 mg tablet TAKE ONE TABLET BY MOUTH TWICE DAILY WITH MEALS    spironolactone (ALDACTONE) 25 mg tablet Take 25 mg by mouth daily.  bumetanide (BUMEX) 1 mg tablet Take 1 mg by mouth as needed.  sodium chloride (NORMAL SALINE FLUSH) 0.9 % 5-10 mL by IntraVENous Push route as needed.  acetaminophen (TYLENOL EXTRA STRENGTH) 500 mg tablet Take 1,000 mg by mouth every six (6) hours as needed for Pain. Indications: BACK PAIN    insulin NPH/insulin regular (NOVOLIN 70/30, HUMULIN 70/30) 100 unit/mL (70-30) injection Take 10 units in the morning and 8 units in the evening.  DULoxetine (CYMBALTA) 30 mg capsule Take 1 Cap by mouth daily.  aspirin delayed-release 81 mg tablet Take 1 Tab by mouth daily.  magnesium oxide (MAG-OX) 400 mg tablet Take 1 Tab by mouth daily.  milrinone (PRIMACOR) 20 mg/100 mL (200 mcg/mL) infusion 28.425 mcg/min by IntraVENous route continuous.  Insulin Syringes, Disposable, 1 mL syrg Pt to take 10 units w/ breakfast and 8 units w/ dinner     No current facility-administered medications for this visit. Follow-up Disposition:  Return in about 2 weeks (around 8/8/2017).     Thank you for letting us see Mr Natalia Elam with you,    Yasmin Rushing, [facility]  Jennifer Roman [phone]

## 2017-07-26 NOTE — COMMUNICATION BODY
[facility] Clinic Note      NAME:  Alta Lemons. :   1950   MRN:   [id number]   PCP:  Omar Rivera MD    Date:  2017     IMPRESSION/PLAN:    ICM, EF 25%, home inotrope, NYHA class II-III  Decrease milrinone to 0.3 mcg/kg/min  Continue Coreg and Spironolactone   Continue Entresto 24/ mg    Plans to decrease milrinone to 0.25 mcg/kg/min at next visit  Media Ran to allow uptitration of Entresto at next visit  Continue Bumex PRN  Check labs in 1 week  Pt to continue daily weights, low sodium diet and fluid consumption to 2 L/daily  Return to [facility] in 2 weeks    HTN  Continue Entresto , Coreg    CAD s/p PCI  Continue ASA, Plavix, Coreg and statin per cardiology          Claudication  ABIs c/w moderate PAD bilaterally   Refer to vascular surgery due to bilateral foot numbness    CKD  Worsening  Repeat Thursday    PAF  Continue amiodarone and Coreg - managed per cardiology    H/O lower GIB  Will need colonic polyp and AVM treated at some point - denies any bleeding issues  Will arrange for f/u once off Plavix    H/O Tobacco Abuse  Pt denies smoking  Reinforced importance of smoking cessation    Diabetes  Insulin management by PCP    Diabetic neuropathy  He states neuropathy has resolved and he no longer requires neurontin  Cymbalta 30 mg daily        Hepatitis C  Undetectable viral load    Subjective:     Mr. Shante Overton is a 62 yo, C male w/ PMH CAD s/p PCI to left main and  of RCA 0n 17,  ICM (LVEF 10%), h/o PAF, s/p ICD, h/o Tob abuse, Hep C (undetectable viral load), h/o GIB w/ colonic polyp and AVM, who presents today for follow up. He presents today for a f/u appt. He reports consistent fatigue which is the same as during his last visit. Mr Shante Overton reports some new bilateral loss of sensation of his feet and persistent claudication. He has not had any troubles with his ICD and follows with Dr Moody Perez as OP.   He sleeps on one pillow and denies orthopnea. He is performing daily weights. His weight is stable. He has a healthy appetite. He is watching his salt intakes and reading labels. He is drinking about 64 ounces a day. He is compliant with all his medications. He denies any tobacco use. He denies any alcohol or illicit drug use. ROS  Pt c/o occasional diarrhea, claudication w/ \"great distance\", fatigue    Pt denies fatigue, fevers, chills, diaphoresis, lightheadedness, dizziness, syncope, N/V, changes to appetite, changes to weight, early satiety, CP, palpitations, SOB, orthopnea, PND, constipation, edema, abdominal fullness, depression or anxiety, hematuria, BRBPR, melena. Objective:     Visit Vitals    /72 (BP 1 Location: Right arm, BP Patient Position: Sitting)    Pulse 90    Temp 98.3 °F (36.8 °C) (Oral)    Resp 16    Ht 5' 10\" (1.778 m)    Wt 167 lb (75.8 kg)    SpO2 98%    BMI 23.96 kg/m2      Physical Exam  Gen:   WA, WN, NAD  HEENT:  EOMI,   Neck:   supple, trache midline, (-) JVD  CVS:      S1/S2  Pul:  CTA b/l (-) r,r,w  Abd:  +BS, soft, NT  Ext:  (-) edema, L PICC line site (-) erythema (-) swelling  Neuro:   No obvious deficits  ICD site R SCV:  Inc c/d/i, healing well, (-( erythema           Past History:     Past Medical History:   Diagnosis Date    Cardiomyopathy ([facility] [address] 1/20/2017    A. Echo (1/19/17):  EF 5-10% with severe GHK,. Mildly dil LA. Mild TR. PASP 46.    Chronic renal insufficiency 3/6/2017    Diabetes mellitus ([address] 3/6/2017    Dyslipidemia 3/6/2017    A. FLP (1/19/17): Tot 120, , HDL 19, LDL 76 (no Rx).  H/O: GI bleed 3/6/2017    Hepatitis C 3/6/2017    Paroxysmal atrial fibrillation ([facility] 3/6/2017     No family history on file. Past Surgical History:   Procedure Laterality Date    COLONOSCOPY N/A 2/17/2017    COLONOSCOPY performed by Isaac Mckinney.  Brianda Bhatia MD at [facility] ENDOSCOPY       Social History   Substance Use Topics    Smoking status: Former Smoker     Packs/day: 2.00     Years: 37.1     Quit date: 1/20/2017    Smokeless tobacco: Never Used    Alcohol use No        No family history on file. Allergies: Allergies   Allergen Reactions    Heparin (Porcine) Unknown (comments)        Data Review:     Medications reviewed:    Current Outpatient Prescriptions   Medication Sig    sacubitril-valsartan (ENTRESTO) 24 mg/26 mg tablet Take 1 Tab by mouth two (2) times a day.  clopidogrel (PLAVIX) 75 mg tab Take 1 Tab by mouth daily.  amiodarone (CORDARONE) 200 mg tablet TAKE ONE TABLET BY MOUTH EVERY 12 HOURS    atorvastatin (LIPITOR) 10 mg tablet TAKE ONE TABLET BY MOUTH ONCE DAILY    carvedilol (COREG) 3.125 mg tablet TAKE ONE TABLET BY MOUTH TWICE DAILY WITH MEALS    spironolactone (ALDACTONE) 25 mg tablet Take 25 mg by mouth daily.  bumetanide (BUMEX) 1 mg tablet Take 1 mg by mouth as needed.  sodium chloride (NORMAL SALINE FLUSH) 0.9 % 5-10 mL by IntraVENous Push route as needed.  acetaminophen (TYLENOL EXTRA STRENGTH) 500 mg tablet Take 1,000 mg by mouth every six (6) hours as needed for Pain. Indications: BACK PAIN    insulin NPH/insulin regular (NOVOLIN 70/30, HUMULIN 70/30) 100 unit/mL (70-30) injection Take 10 units in the morning and 8 units in the evening.  DULoxetine (CYMBALTA) 30 mg capsule Take 1 Cap by mouth daily.  aspirin delayed-release 81 mg tablet Take 1 Tab by mouth daily.  magnesium oxide (MAG-OX) 400 mg tablet Take 1 Tab by mouth daily.  milrinone (PRIMACOR) 20 mg/100 mL (200 mcg/mL) infusion 28.425 mcg/min by IntraVENous route continuous.  Insulin Syringes, Disposable, 1 mL syrg Pt to take 10 units w/ breakfast and 8 units w/ dinner     No current facility-administered medications for this visit. Follow-up Disposition:  Return in about 2 weeks (around 8/8/2017).     Thank you for letting us see Mr Dani High with you,    Caitlin Huerta, [facility]-BC  Jennifer Roman [phone]

## 2017-07-27 ENCOUNTER — HOME CARE VISIT (OUTPATIENT)
Dept: SCHEDULING | Facility: HOME HEALTH | Age: 67
End: 2017-07-27

## 2017-07-27 VITALS
BODY MASS INDEX: 24.11 KG/M2 | HEART RATE: 81 BPM | RESPIRATION RATE: 18 BRPM | DIASTOLIC BLOOD PRESSURE: 78 MMHG | OXYGEN SATURATION: 99 % | SYSTOLIC BLOOD PRESSURE: 128 MMHG | WEIGHT: 168 LBS | TEMPERATURE: 98.5 F

## 2017-07-27 PROCEDURE — G0299 HHS/HOSPICE OF RN EA 15 MIN: HCPCS

## 2017-08-03 ENCOUNTER — HOME CARE VISIT (OUTPATIENT)
Dept: SCHEDULING | Facility: HOME HEALTH | Age: 67
End: 2017-08-03

## 2017-08-03 VITALS
SYSTOLIC BLOOD PRESSURE: 114 MMHG | TEMPERATURE: 99.6 F | OXYGEN SATURATION: 98 % | HEART RATE: 89 BPM | RESPIRATION RATE: 18 BRPM | WEIGHT: 169.2 LBS | DIASTOLIC BLOOD PRESSURE: 66 MMHG | BODY MASS INDEX: 24.28 KG/M2

## 2017-08-03 PROCEDURE — G0299 HHS/HOSPICE OF RN EA 15 MIN: HCPCS

## 2017-08-07 ENCOUNTER — TELEPHONE (OUTPATIENT)
Dept: CARDIOLOGY CLINIC | Age: 67
End: 2017-08-07

## 2017-08-07 DIAGNOSIS — R06.02 SHORTNESS OF BREATH: ICD-10-CM

## 2017-08-07 DIAGNOSIS — I25.5 ISCHEMIC CARDIOMYOPATHY: Primary | ICD-10-CM

## 2017-08-07 NOTE — TELEPHONE ENCOUNTER
Telephone Call RE:  Appointment reminder     Outcome:     [] Patient confirmed appointment   [] Patient rescheduled appointment for    [] Unable to reach   [x] Left message              [] Other:       Shara Webber

## 2017-08-08 ENCOUNTER — OFFICE VISIT (OUTPATIENT)
Dept: CARDIOLOGY CLINIC | Age: 67
End: 2017-08-08

## 2017-08-08 VITALS
WEIGHT: 177 LBS | HEART RATE: 78 BPM | RESPIRATION RATE: 16 BRPM | TEMPERATURE: 97.6 F | OXYGEN SATURATION: 98 % | BODY MASS INDEX: 25.34 KG/M2 | SYSTOLIC BLOOD PRESSURE: 124 MMHG | HEIGHT: 70 IN | DIASTOLIC BLOOD PRESSURE: 76 MMHG

## 2017-08-08 DIAGNOSIS — E11.8 TYPE 2 DIABETES MELLITUS WITH COMPLICATION, UNSPECIFIED LONG TERM INSULIN USE STATUS: ICD-10-CM

## 2017-08-08 DIAGNOSIS — N18.9 CHRONIC RENAL INSUFFICIENCY, UNSPECIFIED STAGE: ICD-10-CM

## 2017-08-08 DIAGNOSIS — I25.5 ISCHEMIC CARDIOMYOPATHY: Primary | ICD-10-CM

## 2017-08-08 NOTE — PROGRESS NOTES
[facility] Clinic Note      NAME:  Chaz Toussaint. :   1950   MRN:   [id number]   PCP:  Nicky Butler MD    Date:  2017     IMPRESSION/PLAN:    ICM, EF 25%, home inotrope, NYHA class II-III  Cont milrinone  0.3 mcg/kg/min  Continue Coreg and Spironolactone   Will try to increase Entresto again to 49/51mg- watch renal function closely  Plans to decrease milrinone to 0.25 mcg/kg/min at next visit if renal function stabilized. Cont [place]  Continue Bumex PRN  Check labs in 1 week  Pt to continue daily weights, low sodium diet and fluid consumption to 2 L/daily  [place] [facility] [place]    CAD s/p PCI  Continue ASA, Plavix, Coreg and statin per cardiology          Claudication  ABIs c/w moderate PAD bilaterally   Refer to vascular surgery due to bilateral foot numbness- patient wants to wait. CKD  Slightly improved  Repeat     PAF  Continue amiodarone and Coreg - managed per cardiology    H/O lower GIB  Will need colonic polyp and AVM treated at some point - denies any bleeding issues  Will arrange for f/u once off Plavix    H/O Tobacco Abuse  Pt denies smoking  Reinforced importance of smoking cessation    Diabetes  Insulin management by PCP- has not seen Dr. Quan Marina yet  Appt scheduled for     Diabetic neuropathy  Cymbalta 30 mg daily        Hepatitis C  Undetectable viral load    Subjective:     Mr. Silvia Anguiano is a 60 yo, C male w/ PMH CAD s/p PCI to left main and  of RCA 0n 17,  ICM (LVEF 10%), h/o PAF, s/p ICD, h/o Tob abuse, Hep C (undetectable viral load), h/o GIB w/ colonic polyp and AVM, who presents today for follow up. He presents today for HF follow up. Since his last appointment he continues to feel well and states that other then his claudication, he feels well. Mr Silvia Anguiano reports some new bilateral loss of sensation of his feet and persistent claudication.   He has not had any troubles with his ICD and follows with  Anup as OP. He sleeps on one pillow and denies orthopnea. He is performing daily weights. His weight is stable. He has a healthy appetite. He is watching his salt intakes and reading labels. He is drinking about 64 ounces a day. He is compliant with all his medications. He denies any tobacco use. He denies any alcohol or illicit drug use. ROS  Pt c/o claudication w/ \"great distance\", fatigue     Pt denies HA, fevers, chills, diaphoresis, lightheadedness, dizziness, syncope, N/V, changes to appetite, changes to weight, early satiety, CP, palpitations, SOB, orthopnea, PND, constipation, edema, abdominal fullness, depression or anxiety, hematuria, BRBPR, melena. Objective:     Visit Vitals    /76 (BP 1 Location: Right arm, BP Patient Position: Sitting)    Pulse 78    Temp 97.6 °F (36.4 °C)    Resp 16    Ht 5' 10\" (1.778 m)    Wt 177 lb (80.3 kg)    SpO2 98%    BMI 25.4 kg/m2      Physical Exam  Gen:   WA, WN, NAD  HEENT:  EOMI,   Neck:   supple, trache midline, (-) JVD  CVS:      S1/S2  Pul:  CTA b/l (-) r,r,w  Abd:  +BS, soft, NT  Ext:  (-) edema, L PICC line site (-) erythema (-) swelling  Neuro:   No obvious deficits  ICD site R SCV:  Inc c/d/i, healing well, (-( erythema           Past History:     Past Medical History:   Diagnosis Date    Cardiomyopathy ([facility] 1/20/2017    A. Echo (1/19/17):  EF 5-10% with severe GHK,. Mildly dil LA. Mild TR. PASP 46.    Chronic renal insufficiency 3/6/2017    Diabetes mellitus ([address] 3/6/2017    Dyslipidemia 3/6/2017    A. FLP (1/19/17): Tot 120, , HDL 19, LDL 76 (no Rx).  H/O: GI bleed 3/6/2017    Hepatitis C 3/6/2017    Paroxysmal atrial fibrillation ([address] 3/6/2017       Past Surgical History:   Procedure Laterality Date    COLONOSCOPY N/A 2/17/2017    COLONOSCOPY performed by Lord Gutierrez  Marcial Matt MD at [facility] ENDOSCOPY       Social History   Substance Use Topics    Smoking status: Former Smoker     Packs/day: 2.00 Years: 37.1     Quit date: 1/20/2017    Smokeless tobacco: Never Used    Alcohol use No        No family history on file. Family history is not known per patient     Allergies: Allergies   Allergen Reactions    Heparin (Porcine) Unknown (comments)        Data Review:     Medications reviewed:    Current Outpatient Prescriptions   Medication Sig    sacubitril-valsartan (ENTRESTO) 49 mg/51 mg tablet Take 1 Tab by mouth two (2) times a day.  milrinone (PRIMACOR) 20 mg/100 mL (200 mcg/mL) infusion 22.74 mcg/min by IntraVENous route continuous.  clopidogrel (PLAVIX) 75 mg tab Take 1 Tab by mouth daily.  amiodarone (CORDARONE) 200 mg tablet TAKE ONE TABLET BY MOUTH EVERY 12 HOURS    atorvastatin (LIPITOR) 10 mg tablet TAKE ONE TABLET BY MOUTH ONCE DAILY    carvedilol (COREG) 3.125 mg tablet TAKE ONE TABLET BY MOUTH TWICE DAILY WITH MEALS    spironolactone (ALDACTONE) 25 mg tablet Take 25 mg by mouth daily.  bumetanide (BUMEX) 1 mg tablet Take 1 mg by mouth as needed.  sodium chloride (NORMAL SALINE FLUSH) 0.9 % 5-10 mL by IntraVENous Push route as needed.  acetaminophen (TYLENOL EXTRA STRENGTH) 500 mg tablet Take 1,000 mg by mouth every six (6) hours as needed for Pain. Indications: BACK PAIN    insulin NPH/insulin regular (NOVOLIN 70/30, HUMULIN 70/30) 100 unit/mL (70-30) injection Take 10 units in the morning and 8 units in the evening.  DULoxetine (CYMBALTA) 30 mg capsule Take 1 Cap by mouth daily.  aspirin delayed-release 81 mg tablet Take 1 Tab by mouth daily.  Insulin Syringes, Disposable, 1 mL syrg Pt to take 10 units w/ breakfast and 8 units w/ dinner    PATIROMER CALCIUM SORBITEX (VELTASSA PO) Take 8.4 mg by mouth daily.  magnesium oxide (MAG-OX) 400 mg tablet Take 1 Tab by mouth daily. No current facility-administered medications for this visit. Follow-up Disposition:  Return in about 3 weeks (around 8/29/2017).     Thank you for letting us see Mr Natalia Elam with you,    Yasmin Rushing, [facility]  Jennifer Roman [phone]

## 2017-08-08 NOTE — LETTER
2017 1:19 PM 
 
Patient:  Ludwin Khan. YOB: 1950 Date of Visit: 2017 Dear Chao Carlin MD 
[address] [address] [facility] [address] VIA In Basket Nicole Medellin MD 
[address] [address] VIA In Basket Beryle Ferns, MD 
[address] Shivam [phone] [address] [address] VIA In Basket Ryder Sawyer MD 
[facility] [address] [address] VIA Facsimile: [phone] 
 : Thank you for referring Mr. New Juárez to me for evaluation/treatment. Below are the relevant portions of my assessment and plan of care. [facility] Note NAME:  Ludwin Khan. :   1950 MRN:   [id number] PCP:  Chao Carlin MD 
 
Date:  2017 IMPRESSION/PLAN: 
 
ICM, EF 25%, home inotrope, NYHA class II-III Cont milrinone  0.3 mcg/kg/min Continue Coreg and Spironolactone Will try to increase Entresto again to 49/51mg- watch renal function closely Plans to decrease milrinone to 0.25 mcg/kg/min at next visit if renal function stabilized. Cont Veltassa Continue Bumex PRN Check labs in 1 week Pt to continue daily weights, low sodium diet and fluid consumption to 2 L/daily Return to [facility] in 2-3 weeks CAD s/p PCI Continue ASA, Plavix, Coreg and statin per cardiology Claudication ABIs c/w moderate PAD bilaterally Refer to vascular surgery due to bilateral foot numbness- patient wants to wait. CKD Slightly improved Repeat  PAF Continue amiodarone and Coreg - managed per cardiology H/O lower GIB Will need colonic polyp and AVM treated at some point - denies any bleeding issues Will arrange for f/u once off Plavix H/O Tobacco Abuse Pt denies smoking Reinforced importance of smoking cessation Diabetes Insulin management by PCP- has not seen Dr. Celsa Eldridge yet Appt scheduled for  Diabetic neuropathy Cymbalta 30 mg daily Hepatitis C Undetectable viral load Subjective:  
 
Mr. Rudy Cain is a 62 yo, C male w/ PMH CAD s/p PCI to left main and  of RCA 0n 2/9/17,  ICM (LVEF 10%), h/o PAF, s/p ICD, h/o Tob abuse, Hep C (undetectable viral load), h/o GIB w/ colonic polyp and AVM, who presents today for follow up. He presents today for HF follow up. Since his last appointment he continues to feel well and states that other then his claudication, he feels well. Mr Rudy Cain reports some new bilateral loss of sensation of his feet and persistent claudication. He has not had any troubles with his ICD and follows with Dr Cristopher Tello as OP. He sleeps on one pillow and denies orthopnea. He is performing daily weights. His weight is stable. He has a healthy appetite. He is watching his salt intakes and reading labels. He is drinking about 64 ounces a day. He is compliant with all his medications. He denies any tobacco use. He denies any alcohol or illicit drug use. ROS Pt c/o claudication w/ \"great distance\", fatigue Pt denies HA, fevers, chills, diaphoresis, lightheadedness, dizziness, syncope, N/V, changes to appetite, changes to weight, early satiety, CP, palpitations, SOB, orthopnea, PND, constipation, edema, abdominal fullness, depression or anxiety, hematuria, BRBPR, melena. Objective:  
 
Visit Vitals  /76 (BP 1 Location: Right arm, BP Patient Position: Sitting)  Pulse 78  Temp 97.6 °F (36.4 °C)  Resp 16  
 Ht 5' 10\" (1.778 m)  Wt 177 lb (80.3 kg)  SpO2 98%  BMI 25.4 kg/m2 Physical Exam 
Gen:   WA, WN, NAD HEENT:  EOMI, Neck:   supple, trache midline, (-) JVD 
CVS:      S1/S2 Pul:  CTA b/l (-) r,r,w 
Abd:  +BS, soft, NT Ext:  (-) edema, L PICC line site (-) erythema (-) swelling Neuro:   No obvious deficits ICD site R SCV:  Inc c/d/i, healing well, (-( erythema Past History:  
 
Past Medical History:  
Diagnosis Date  Cardiomyopathy ([facility] 1/20/2017 A. Echo (1/19/17):  EF 5-10% with severe GHK,. Mildly dil LA. Mild TR. PASP 46.  
 Chronic renal insufficiency 3/6/2017  Diabetes mellitus ([facility] 3/6/2017  Dyslipidemia 3/6/2017 A. FLP (1/19/17): Tot 120, , HDL 19, LDL 76 (no Rx).  H/O: GI bleed 3/6/2017  Hepatitis C 3/6/2017  Paroxysmal atrial fibrillation ([facility] 3/6/2017 Past Surgical History:  
Procedure Laterality Date  COLONOSCOPY N/A 2/17/2017 COLONOSCOPY performed by Mikal Moulton. Lucille De MD at [facility] ENDOSCOPY Social History Substance Use Topics  Smoking status: Former Smoker Packs/day: 2.00 Years: 45.00 Quit date: 1/20/2017  Smokeless tobacco: Never Used  Alcohol use No  
  
 
No family history on file. Family history is not known per patient Allergies: Allergies Allergen Reactions  Heparin (Porcine) Unknown (comments) Data Review:  
 
Medications reviewed: 
 
Current Outpatient Prescriptions Medication Sig  
 sacubitril-valsartan (ENTRESTO) 49 mg/51 mg tablet Take 1 Tab by mouth two (2) times a day.  milrinone (PRIMACOR) 20 mg/100 mL (200 mcg/mL) infusion 22.74 mcg/min by IntraVENous route continuous.  clopidogrel (PLAVIX) 75 mg tab Take 1 Tab by mouth daily.  amiodarone (CORDARONE) 200 mg tablet TAKE ONE TABLET BY MOUTH EVERY 12 HOURS  atorvastatin (LIPITOR) 10 mg tablet TAKE ONE TABLET BY MOUTH ONCE DAILY  carvedilol (COREG) 3.125 mg tablet TAKE ONE TABLET BY MOUTH TWICE DAILY WITH MEALS  spironolactone (ALDACTONE) 25 mg tablet Take 25 mg by mouth daily.  bumetanide (BUMEX) 1 mg tablet Take 1 mg by mouth as needed.  sodium chloride (NORMAL SALINE FLUSH) 0.9 % 5-10 mL by IntraVENous Push route as needed.  acetaminophen (TYLENOL EXTRA STRENGTH) 500 mg tablet Take 1,000 mg by mouth every six (6) hours as needed for Pain.  Indications: BACK PAIN  
  insulin NPH/insulin regular (NOVOLIN 70/30, HUMULIN 70/30) 100 unit/mL (70-30) injection Take 10 units in the morning and 8 units in the evening.  DULoxetine (CYMBALTA) 30 mg capsule Take 1 Cap by mouth daily.  aspirin delayed-release 81 mg tablet Take 1 Tab by mouth daily.  Insulin Syringes, Disposable, 1 mL syrg Pt to take 10 units w/ breakfast and 8 units w/ dinner  PATIROMER CALCIUM SORBITEX (VELTASSA PO) Take 8.4 mg by mouth daily.  magnesium oxide (MAG-OX) 400 mg tablet Take 1 Tab by mouth daily. No current facility-administered medications for this visit. Follow-up Disposition: 
Return in about 3 weeks (around 8/29/2017). Thank you for letting us see Mr Caballero Angry with you, Marti Milner, [facility] Jennifer Roman 0419 If you have questions, please do not hesitate to call me. I look forward to following Mr. Caballero Angry along with you. Sincerely, Dodie Olmos MD

## 2017-08-08 NOTE — MR AVS SNAPSHOT
Visit Information Date & Time Provider Department Dept. Phone Encounter #  
 8/8/2017 11:00 AM Devan Yadav MD [address] [id number] Your Appointments 9/18/2017  2:20 PM  
ESTABLISHED PATIENT with Galdino Yanes MD  
[facility] ([facility] SCHEDULING) Appt Note: 5 mo fup; 5 mo [address] [place] [phone]  
  
   
 [address] [address]  
  
    
 7/25/2018 10:00 AM  
ESTABLISHED PATIENT with Emelina Preston MD  
[facility] ([address]) Appt Note: annual  
 320 [facility] [address] 1007 [facility]  
[address] [facility] [address] [address] Upcoming Health Maintenance Date Due  
 FOOT EXAM Q1 11/23/1962 MICROALBUMIN Q1 11/23/1962 EYE EXAM RETINAL OR DILATED Q1 11/23/1962 Pneumococcal 19-64 Medium Risk (1 of 1 - PPSV23) 11/23/1971 DTaP/Tdap/Td series (1 - Tdap) 11/23/1973 ZOSTER VACCINE AGE 60> 9/23/2012 HEMOGLOBIN A1C Q6M 7/19/2017 INFLUENZA AGE 9 TO ADULT 8/1/2017 LIPID PANEL Q1 1/19/2018 FOBT Q 1 YEAR AGE 50-75 2/16/2018 Allergies as of 8/8/2017  Review Complete On: 8/8/2017 By: Leanna Moran RN Severity Noted Reaction Type Reactions Heparin (Porcine)  02/07/2017    Unknown (comments) Current Immunizations  Reviewed on 2/14/2017 No immunizations on file. Not reviewed this visit You Were Diagnosed With   
  
 Codes Comments Ischemic cardiomyopathy    -  Primary ICD-10-CM: I25.5 ICD-9-CM: 414.8 Type 2 diabetes mellitus with complication, unspecified long term insulin use status (HCC)     ICD-10-CM: E11.8 ICD-9-CM: 250.90 Chronic renal insufficiency, unspecified stage     ICD-10-CM: N18.9 ICD-9-CM: 645. 9 Vitals BP Pulse Temp Resp Height(growth percentile) Weight(growth percentile) 124/76 (BP 1 Location: Right arm, BP Patient Position: Sitting) 78 97.6 °F (36.4 °C) 16 5' 10\" (1.778 m) 177 lb (80.3 kg) SpO2 BMI Smoking Status 98% 25.4 kg/m2 Former Smoker Vitals History BMI and BSA Data Body Mass Index Body Surface Area  
 25.4 kg/m 2 1.99 m 2 Preferred Pharmacy Pharmacy Name Phone [facility] [address] [place] [phone] Your Updated Medication List  
  
   
This list is accurate as of: 8/8/17 11:56 AM.  Always use your most recent med list.  
  
  
  
  
 amiodarone 200 mg tablet Commonly known as:  CORDARONE  
TAKE ONE TABLET BY MOUTH EVERY 12 HOURS  
  
 aspirin delayed-release 81 mg tablet Take 1 Tab by mouth daily. atorvastatin 10 mg tablet Commonly known as:  LIPITOR  
TAKE ONE TABLET BY MOUTH ONCE DAILY  
  
 bumetanide 1 mg tablet Commonly known as:  Jenna Lathe Take 1 mg by mouth as needed. carvedilol 3.125 mg tablet Commonly known as:  COREG  
TAKE ONE TABLET BY MOUTH TWICE DAILY WITH MEALS  
  
 clopidogrel 75 mg Tab Commonly known as:  PLAVIX Take 1 Tab by mouth daily. DULoxetine 30 mg capsule Commonly known as:  CYMBALTA Take 1 Cap by mouth daily. insulin NPH/insulin regular 100 unit/mL (70-30) injection Commonly known as:  NOVOLIN 70/30, HUMULIN 70/30 Take 10 units in the morning and 8 units in the evening. Insulin Syringes (Disposable) 1 mL Syrg Pt to take 10 units w/ breakfast and 8 units w/ dinner  
  
 magnesium oxide 400 mg tablet Commonly known as:  MAG-OX Take 1 Tab by mouth daily. milrinone 20 mg/100 mL (200 mcg/mL) infusion Commonly known as:  PRIMACOR  
22.74 mcg/min by IntraVENous route continuous. NORMAL SALINE FLUSH 0.9 % Generic drug:  sodium chloride 5-10 mL by IntraVENous Push route as needed. sacubitril-valsartan 49-51 mg Tab tablet Commonly known as:  ENTRESTO Take 1 Tab by mouth two (2) times a day. spironolactone 25 mg tablet Commonly known as:  ALDACTONE Take 25 mg by mouth daily. TYLENOL EXTRA STRENGTH 500 mg tablet Generic drug:  acetaminophen Take 1,000 mg by mouth every six (6) hours as needed for Pain. Indications: BACK PAIN  
  
 VELTASSA PO Take 8.4 mg by mouth daily. To-Do List   
 08/10/2017 To Be Determined Appointment with Micah Torres, RN at [place] [address]  
  
 08/16/2017 To Be Determined Appointment with Lula Savage RN at [place] [address] Patient Instructions Please increase your entresto to 49/51mg (1 pill) twice a day Follow up in 2-3 weeks Labs in 1 week- can be drawn by [facility] nurse Please call the office if you noticed an increase in dizziness or lightheadedness. Introducing [facility]! Jennifer Russell introduces [email or web address] patient portal. Now you can access parts of your medical record, email your doctor's office, and request medication refills online. 1. In your internet browser, go to [email or web address] 2. Click on the First Time User? Click Here link in the Sign In box. You will see the New Member Sign Up page. 3. Enter your [email or web address] Access Code exactly as it appears below. You will not need to use this code after youve completed the sign-up process. If you do not sign up before the expiration date, you must request a new code. · [email or web address] Access Code: [email or web address] Expires: 10/22/2017 12:10 PM 
 
4. Enter the last four digits of your Social Security Number (xxxx) and Date of Birth (mm/dd/yyyy) as indicated and click Submit. You will be taken to the next sign-up page. 5. Create a [email or web address] ID. This will be your [email or web address] login ID and cannot be changed, so think of one that is secure and easy to remember. 6. Create a [employer] password. You can change your password at any time. 7. Enter your Password Reset Question and Answer. This can be used at a later time if you forget your password. 8. Enter your e-mail address. You will receive e-mail notification when new information is available in [address] 9. Click Sign Up. You can now view and download portions of your medical record. 10. Click the Download Summary menu link to download a portable copy of your medical information. If you have questions, please visit the Frequently Asked Questions section of the [employer] website. Remember, [employer] is NOT to be used for urgent needs. For medical emergencies, dial 911. Now available from your iPhone and Android! Please provide this summary of care documentation to your next provider. Your primary care clinician is listed as MICHAEL BALLARD. If you have any questions after today's visit, please call [phone].

## 2017-08-08 NOTE — COMMUNICATION BODY
[facility] Note      NAME:  Mandy Costello. :   1950   MRN:   [id number]   PCP:  Maribell Peters MD    Date:  2017     IMPRESSION/PLAN:    ICM, EF 25%, home inotrope, NYHA class II-III  Cont milrinone  0.3 mcg/kg/min  Continue Coreg and Spironolactone   Will try to increase Entresto again to 49/51mg- watch renal function closely  Plans to decrease milrinone to 0.25 mcg/kg/min at next visit if renal function stabilized. Cont Veltassa  Continue Bumex PRN  Check labs in 1 week  Pt to continue daily weights, low sodium diet and fluid consumption to 2 L/daily  Return to [facility] in 2-3 weeks    CAD s/p PCI  Continue ASA, Plavix, Coreg and statin per cardiology          Claudication  ABIs c/w moderate PAD bilaterally   Refer to vascular surgery due to bilateral foot numbness- patient wants to wait. CKD  Slightly improved  Repeat     PAF  Continue amiodarone and Coreg - managed per cardiology    H/O lower GIB  Will need colonic polyp and AVM treated at some point - denies any bleeding issues  Will arrange for f/u once off Plavix    H/O Tobacco Abuse  Pt denies smoking  Reinforced importance of smoking cessation    Diabetes  Insulin management by PCP- has not seen Dr. Emmy Briggs yet  Appt scheduled for     Diabetic neuropathy  Cymbalta 30 mg daily        Hepatitis C  Undetectable viral load    Subjective:     Mr. Edgar Echevarria is a 62 yo, C male w/ PMH CAD s/p PCI to left main and  of RCA 0n 17,  ICM (LVEF 10%), h/o PAF, s/p ICD, h/o Tob abuse, Hep C (undetectable viral load), h/o GIB w/ colonic polyp and AVM, who presents today for follow up. He presents today for HF follow up. Since his last appointment he continues to feel well and states that other then his claudication, he feels well. Mr Edgar Echevarria reports some new bilateral loss of sensation of his feet and persistent claudication.   He has not had any troubles with his ICD and follows with  Anup as OP. He sleeps on one pillow and denies orthopnea. He is performing daily weights. His weight is stable. He has a healthy appetite. He is watching his salt intakes and reading labels. He is drinking about 64 ounces a day. He is compliant with all his medications. He denies any tobacco use. He denies any alcohol or illicit drug use. ROS  Pt c/o claudication w/ \"great distance\", fatigue     Pt denies HA, fevers, chills, diaphoresis, lightheadedness, dizziness, syncope, N/V, changes to appetite, changes to weight, early satiety, CP, palpitations, SOB, orthopnea, PND, constipation, edema, abdominal fullness, depression or anxiety, hematuria, BRBPR, melena. Objective:     Visit Vitals    /76 (BP 1 Location: Right arm, BP Patient Position: Sitting)    Pulse 78    Temp 97.6 °F (36.4 °C)    Resp 16    Ht 5' 10\" (1.778 m)    Wt 177 lb (80.3 kg)    SpO2 98%    BMI 25.4 kg/m2      Physical Exam  Gen:   WA, WN, NAD  HEENT:  EOMI,   Neck:   supple, trache midline, (-) JVD  CVS:      S1/S2  Pul:  CTA b/l (-) r,r,w  Abd:  +BS, soft, NT  Ext:  (-) edema, L PICC line site (-) erythema (-) swelling  Neuro:   No obvious deficits  ICD site R SCV:  Inc c/d/i, healing well, (-( erythema           Past History:     Past Medical History:   Diagnosis Date    Cardiomyopathy ([facility] 1/20/2017    A. Echo (1/19/17):  EF 5-10% with severe GHK,. Mildly dil LA. Mild TR. PASP 46.    Chronic renal insufficiency 3/6/2017    Diabetes mellitus ([address] 3/6/2017    Dyslipidemia 3/6/2017    A. FLP (1/19/17): Tot 120, , HDL 19, LDL 76 (no Rx).  H/O: GI bleed 3/6/2017    Hepatitis C 3/6/2017    Paroxysmal atrial fibrillation ([address] 3/6/2017       Past Surgical History:   Procedure Laterality Date    COLONOSCOPY N/A 2/17/2017    COLONOSCOPY performed by Humaira Monaco.  Rafa Baum MD at [facility] ENDOSCOPY       Social History   Substance Use Topics    Smoking status: Former Smoker     Packs/day: 2.00 Years: 37.1     Quit date: 1/20/2017    Smokeless tobacco: Never Used    Alcohol use No        No family history on file. Family history is not known per patient     Allergies: Allergies   Allergen Reactions    Heparin (Porcine) Unknown (comments)        Data Review:     Medications reviewed:    Current Outpatient Prescriptions   Medication Sig    sacubitril-valsartan (ENTRESTO) 49 mg/51 mg tablet Take 1 Tab by mouth two (2) times a day.  milrinone (PRIMACOR) 20 mg/100 mL (200 mcg/mL) infusion 22.74 mcg/min by IntraVENous route continuous.  clopidogrel (PLAVIX) 75 mg tab Take 1 Tab by mouth daily.  amiodarone (CORDARONE) 200 mg tablet TAKE ONE TABLET BY MOUTH EVERY 12 HOURS    atorvastatin (LIPITOR) 10 mg tablet TAKE ONE TABLET BY MOUTH ONCE DAILY    carvedilol (COREG) 3.125 mg tablet TAKE ONE TABLET BY MOUTH TWICE DAILY WITH MEALS    spironolactone (ALDACTONE) 25 mg tablet Take 25 mg by mouth daily.  bumetanide (BUMEX) 1 mg tablet Take 1 mg by mouth as needed.  sodium chloride (NORMAL SALINE FLUSH) 0.9 % 5-10 mL by IntraVENous Push route as needed.  acetaminophen (TYLENOL EXTRA STRENGTH) 500 mg tablet Take 1,000 mg by mouth every six (6) hours as needed for Pain. Indications: BACK PAIN    insulin NPH/insulin regular (NOVOLIN 70/30, HUMULIN 70/30) 100 unit/mL (70-30) injection Take 10 units in the morning and 8 units in the evening.  DULoxetine (CYMBALTA) 30 mg capsule Take 1 Cap by mouth daily.  aspirin delayed-release 81 mg tablet Take 1 Tab by mouth daily.  Insulin Syringes, Disposable, 1 mL syrg Pt to take 10 units w/ breakfast and 8 units w/ dinner    PATIROMER CALCIUM SORBITEX (VELTASSA PO) Take 8.4 mg by mouth daily.  magnesium oxide (MAG-OX) 400 mg tablet Take 1 Tab by mouth daily. No current facility-administered medications for this visit. Follow-up Disposition:  Return in about 3 weeks (around 8/29/2017).     Thank you for letting us see Mr Stella Oh with you,    Austin Rivers, [facility]  Jennifer Roman [address]1

## 2017-08-08 NOTE — PATIENT INSTRUCTIONS
Please increase your entresto to 49/51mg (1 pill) twice a day    Follow up in 2-3 weeks    Labs in 1 week- can be drawn by [facility] nurse    Please call the office if you noticed an increase in dizziness or lightheadedness.

## 2017-08-10 ENCOUNTER — HOME CARE VISIT (OUTPATIENT)
Dept: SCHEDULING | Facility: HOME HEALTH | Age: 67
End: 2017-08-10

## 2017-08-10 ENCOUNTER — TELEPHONE (OUTPATIENT)
Dept: CARDIOLOGY CLINIC | Age: 67
End: 2017-08-10

## 2017-08-10 VITALS
SYSTOLIC BLOOD PRESSURE: 132 MMHG | BODY MASS INDEX: 24.39 KG/M2 | DIASTOLIC BLOOD PRESSURE: 76 MMHG | HEART RATE: 78 BPM | RESPIRATION RATE: 14 BRPM | WEIGHT: 170 LBS | OXYGEN SATURATION: 98 % | TEMPERATURE: 98.7 F

## 2017-08-10 PROCEDURE — G0299 HHS/HOSPICE OF RN EA 15 MIN: HCPCS

## 2017-08-16 ENCOUNTER — HOME CARE VISIT (OUTPATIENT)
Dept: SCHEDULING | Facility: HOME HEALTH | Age: 67
End: 2017-08-16

## 2017-08-16 PROCEDURE — G0299 HHS/HOSPICE OF RN EA 15 MIN: HCPCS

## 2017-08-17 ENCOUNTER — HOME CARE VISIT (OUTPATIENT)
Dept: SCHEDULING | Facility: HOME HEALTH | Age: 67
End: 2017-08-17

## 2017-08-17 ENCOUNTER — OFFICE VISIT (OUTPATIENT)
Dept: FAMILY MEDICINE CLINIC | Age: 67
End: 2017-08-17

## 2017-08-17 ENCOUNTER — TELEPHONE (OUTPATIENT)
Dept: CARDIOLOGY CLINIC | Age: 67
End: 2017-08-17

## 2017-08-17 VITALS
OXYGEN SATURATION: 97 % | RESPIRATION RATE: 14 BRPM | TEMPERATURE: 98.7 F | BODY MASS INDEX: 24.82 KG/M2 | WEIGHT: 173 LBS | HEART RATE: 84 BPM | DIASTOLIC BLOOD PRESSURE: 62 MMHG | SYSTOLIC BLOOD PRESSURE: 114 MMHG

## 2017-08-17 VITALS
BODY MASS INDEX: 25.62 KG/M2 | SYSTOLIC BLOOD PRESSURE: 130 MMHG | DIASTOLIC BLOOD PRESSURE: 73 MMHG | RESPIRATION RATE: 20 BRPM | OXYGEN SATURATION: 99 % | HEIGHT: 70 IN | WEIGHT: 179 LBS | HEART RATE: 88 BPM | TEMPERATURE: 98 F

## 2017-08-17 DIAGNOSIS — I25.5 ISCHEMIC CARDIOMYOPATHY: ICD-10-CM

## 2017-08-17 DIAGNOSIS — N18.9 CHRONIC RENAL INSUFFICIENCY, UNSPECIFIED STAGE: ICD-10-CM

## 2017-08-17 DIAGNOSIS — B18.2 CHRONIC HEPATITIS C WITHOUT HEPATIC COMA (HCC): ICD-10-CM

## 2017-08-17 DIAGNOSIS — I50.22 SYSTOLIC CHF, CHRONIC (HCC): ICD-10-CM

## 2017-08-17 DIAGNOSIS — E11.8 TYPE 2 DIABETES MELLITUS WITH COMPLICATION, UNSPECIFIED LONG TERM INSULIN USE STATUS: ICD-10-CM

## 2017-08-17 DIAGNOSIS — I25.10 CORONARY ARTERY DISEASE INVOLVING NATIVE HEART WITHOUT ANGINA PECTORIS, UNSPECIFIED VESSEL OR LESION TYPE: Primary | ICD-10-CM

## 2017-08-17 DIAGNOSIS — E78.5 DYSLIPIDEMIA: ICD-10-CM

## 2017-08-17 DIAGNOSIS — I48.0 PAROXYSMAL ATRIAL FIBRILLATION (HCC): ICD-10-CM

## 2017-08-17 PROBLEM — E11.9 DIABETES MELLITUS (HCC): Status: RESOLVED | Noted: 2017-03-06 | Resolved: 2017-08-17

## 2017-08-17 LAB — HBA1C MFR BLD HPLC: 6.4 %

## 2017-08-17 PROCEDURE — G0299 HHS/HOSPICE OF RN EA 15 MIN: HCPCS

## 2017-08-17 NOTE — MR AVS SNAPSHOT
Visit Information Date & Time Provider Department Dept. Phone Encounter #  
 8/17/2017 10:00 AM Darleen Davis MD [address] [facility] [phone] [id number] Follow-up Instructions Return in about 4 months (around 12/17/2017). Your Appointments 8/21/2017 10:30 AM  
Follow Up with Alicia Marie MD  
[address] [facility] ([facility] [address]  
[phone]  
  
   
 [address] [facility] 401 [facility] [address]  
  
    
 9/18/2017  2:20 PM  
ESTABLISHED PATIENT with Marvin Rivera MD  
[facility] ([facility] SCHEDULING) Appt Note: 5 mo fup; 5 mo [address] [place] [address] [facility]  
[phone]  
  
   
 [address] [facility] [place] [facility] [address]  
  
    
 7/25/2018 10:00 AM  
ESTABLISHED PATIENT with Rhonda Moseley MD  
[facility] ([facility]) Appt Note: annual  
 [address] [facility] [address] 1007 [facility]  
[address] [place] [address] [facility] [address] Upcoming Health Maintenance Date Due  
 FOOT EXAM Q1 11/23/1962 MICROALBUMIN Q1 11/23/1962 EYE EXAM RETINAL OR DILATED Q1 11/23/1962 Pneumococcal 19-64 Medium Risk (1 of 1 - PPSV23) 11/23/1971 DTaP/Tdap/Td series (1 - Tdap) 11/23/1973 ZOSTER VACCINE AGE 60> 9/23/2012 HEMOGLOBIN A1C Q6M 7/19/2017 INFLUENZA AGE 9 TO ADULT 8/1/2017 LIPID PANEL Q1 1/19/2018 FOBT Q 1 YEAR AGE 50-75 2/16/2018 Allergies as of 8/17/2017  Review Complete On: 8/17/2017 By: Darleen Davis MD  
  
 Severity Noted Reaction Type Reactions Heparin (Porcine)  02/07/2017    Unknown (comments) Current Immunizations  Reviewed on 2/14/2017 No immunizations on file. Not reviewed this visit You Were Diagnosed With   
  
 Codes Comments Coronary artery disease involving native heart without angina pectoris, unspecified vessel or lesion type    -  Primary ICD-10-CM: I25.10 ICD-9-CM: 414.01 Systolic CHF, chronic (HCC)     ICD-10-CM: I50.22 ICD-9-CM: 428.22, 428.0 Type 2 diabetes mellitus with complication, unspecified long term insulin use status (HCC)     ICD-10-CM: E11.8 ICD-9-CM: 250.90 Dyslipidemia     ICD-10-CM: E78.5 ICD-9-CM: 272.4 Chronic renal insufficiency, unspecified stage     ICD-10-CM: N18.9 ICD-9-CM: 585.9 Ischemic cardiomyopathy     ICD-10-CM: I25.5 ICD-9-CM: 414.8 Chronic hepatitis C without hepatic coma (HCC)     ICD-10-CM: B18.2 ICD-9-CM: 070.54 Paroxysmal atrial fibrillation (HCC)     ICD-10-CM: I48.0 ICD-9-CM: 427.31 Vitals BP Pulse Temp Resp Height(growth percentile) Weight(growth percentile) 130/73 88 98 °F (36.7 °C) 20 5' 10\" (1.778 m) 179 lb (81.2 kg) SpO2 BMI Smoking Status 99% 25.68 kg/m2 Former Smoker Vitals History BMI and BSA Data Body Mass Index Body Surface Area  
 25.68 kg/m 2 2 m 2 Preferred Pharmacy Pharmacy Name Phone [address] [phone] Your Updated Medication List  
  
   
This list is accurate as of: 8/17/17 10:55 AM.  Always use your most recent med list.  
  
  
  
  
 amiodarone 200 mg tablet Commonly known as:  CORDARONE  
TAKE ONE TABLET BY MOUTH EVERY 12 HOURS  
  
 aspirin delayed-release 81 mg tablet Take 1 Tab by mouth daily. atorvastatin 10 mg tablet Commonly known as:  LIPITOR  
TAKE ONE TABLET BY MOUTH ONCE DAILY  
  
 bumetanide 1 mg tablet Commonly known as:  Leanord Miu Take 1 mg by mouth as needed. carvedilol 3.125 mg tablet Commonly known as:  COREG  
TAKE ONE TABLET BY MOUTH TWICE DAILY WITH MEALS  
  
 clopidogrel 75 mg Tab Commonly known as:  PLAVIX Take 1 Tab by mouth daily. DULoxetine 30 mg capsule Commonly known as:  CYMBALTA Take 1 Cap by mouth daily. insulin NPH/insulin regular 100 unit/mL (70-30) injection Commonly known as:  NOVOLIN 70/30, HUMULIN 70/30  
10 Units by SubCUTAneous route two (2) times a day. Take 10 units in the morning and 8 units in the evening. Insulin Syringes (Disposable) 1 mL Syrg Pt to take 10 units w/ breakfast and 8 units w/ dinner  
  
 magnesium oxide 400 mg tablet Commonly known as:  MAG-OX Take 1 Tab by mouth daily. milrinone 20 mg/100 mL (200 mcg/mL) infusion Commonly known as:  PRIMACOR  
22.74 mcg/min by IntraVENous route continuous. NORMAL SALINE FLUSH 0.9 % Generic drug:  sodium chloride 5-10 mL by IntraVENous Push route as needed. patiromer calcium sorbitex 8.4 gram powder Commonly known as:  VELTASSA Take 8.4 g by mouth daily. sacubitril-valsartan 49-51 mg Tab tablet Commonly known as:  ENTRESTO Take 1 Tab by mouth two (2) times a day. spironolactone 25 mg tablet Commonly known as:  ALDACTONE Take 25 mg by mouth daily. TYLENOL EXTRA STRENGTH 500 mg tablet Generic drug:  acetaminophen Take 1,000 mg by mouth every six (6) hours as needed for Pain. Indications: BACK PAIN We Performed the Following AMB POC HEMOGLOBIN A1C [11000 CPT(R)] Follow-up Instructions Return in about 4 months (around 12/17/2017). To-Do List   
 08/18/2017 To Be Determined Appointment with Ann Naranjo RN at [address] [facility]! Cindy Bauer introduces [employer] patient portal. Now you can access parts of your medical record, email your doctor's office, and request medication refills online. 1. In your internet browser, go to [email or web address] 2. Click on the First Time User? Click Here link in the Sign In box. You will see the New Member Sign Up page. 3. Enter your Mc[employer] Access Code exactly as it appears below. You will not need to use this code after youve completed the sign-up process. If you do not sign up before the expiration date, you must request a new code. · Mc4 Access Code: [email or web address] Expires: 10/22/2017 12:10 PM 
 
4. Enter the last four digits of your Social Security Number (xxxx) and Date of Birth (mm/dd/yyyy) as indicated and click Submit. You will be taken to the next sign-up page. 5. Create a Mc4 ID. This will be your Mc4 login ID and cannot be changed, so think of one that is secure and easy to remember. 6. Create a Mc4 password. You can change your password at any time. 7. Enter your Password Reset Question and Answer. This can be used at a later time if you forget your password. 8. Enter your e-mail address. You will receive e-mail notification when new information is available in [address] 9. Click Sign Up. You can now view and download portions of your medical record. 10. Click the Download Summary menu link to download a portable copy of your medical information. If you have questions, please visit the Frequently Asked Questions section of the Mc[employer] website. Remember, Mc4 is NOT to be used for urgent needs. For medical emergencies, dial 911. Now available from your iPhone and Android! Please provide this summary of care documentation to your next provider. Your primary care clinician is listed as MICHAEL BALLARD. If you have any questions after today's visit, please call [phone].

## 2017-08-17 NOTE — PROGRESS NOTES
New patient here to [facility]. He was admitted to [facility] in 2/2017 for edema and advanced heart failure. It was 5% working. He had surgery, ICD placed and two stents placed while he was admitted. He was discharged with Milrinone through PICC line in left upper arm. [address] come and change dressings weekly. Patient states he feels ok and has desire to do things but the low blood flow in his legs make it hard for him to walk for a long period of time. Otherwise, feeling ok. Cardiologist is Dr. Ian Grant. Has hx of severe CHF and has an ICD    EF was 5% and now at 25%    Has DM and needs A1C and is on insulin          No results found for: Rebecca Bustamante, MCA2, MCA3, MCAU   Chief Complaint   Patient presents with   [address] [facility] patient here to [facility]. he is a 61y.o. year old male who presents for evaluation. See Diabetic Report Card listed above. Patient Active Problem List    Diagnosis    Coronary artery disease involving native heart without angina pectoris    Systolic CHF, chronic (HCC)    Type 2 diabetes mellitus with complication ([facility]    ICD (implantable cardioverter-defibrillator), single, in situ    Paroxysmal atrial fibrillation ([facility]    H/O: GI bleed    Hepatitis C    Chronic renal insufficiency    Dyslipidemia     A. FLP (1/19/17): Tot 120, , HDL 19, LDL 76 (no Rx).  Ischemic cardiomyopathy     A.  Echo (1/19/17):  EF 5-10% with severe GHK,. Mildly dil LA. Mild TR. PASP 46. B. Cath (1/20/17):  LM ost70. LAD m50. D1 80. LCx d70; OM1 99, OM2 90. RCA p100. No AVG. PAP  53/27/36. C.  PCI (2/9/17): pRCA 2.5x38 Xience + 2.75x12 Xience. ostLM 4.0x12 Xience post-dil with 4.5x12 NC. D.  Echo (2/13/17):  EF 10% with severe GHK, PSM. Dil LA. Mod MR. Mild to mod TR. Left pleural effusion. E.  Echo (5/15/17): EF 25% with severe GHK and basl-mid inf AK, mildly dil LV, DD. Sev dil LA. Mod MR. Mild TR. PASP 35.          Nurses notes were copied in to this note. Reviewed PmHx, RxHx, FmHx, SocHx, AllgHx--dated and updated in the chart. Review of Systems - negative except as listed above in the HPI    Objective:     Vitals:    08/17/17 1024   BP: 130/73   Pulse: 88   Resp: 20   Temp: 98 °F (36.7 °C)   SpO2: 99%   Weight: 179 lb (81.2 kg)   Height: 5' 10\" (1.778 m)     Physical Examination: General appearance - alert, well appearing, and in no distress  Eyes - pupils equal and reactive, extraocular eye movements intact  Ears - bilateral TM's and external ear canals normal  Nose - normal and patent, no erythema, discharge or polyps  Mouth - mucous membranes moist, pharynx normal without lesions  Neck - supple, no significant adenopathy  Chest - clear to auscultation, no wheezes, rales or rhonchi, symmetric air entry  Heart - normal rate, regular rhythm, normal S1, S2, no murmurs, rubs, clicks or gallops  Abdomen - soft, nontender, nondistended, no masses or organomegaly  Extremities - pic line left arm    Assessment/ Plan:   Diagnoses and all orders for this visit:    1. Coronary artery disease involving native heart without angina pectoris, unspecified vessel or lesion type  2. Systolic CHF, chronic ([address]  3. Type 2 diabetes mellitus with complication, unspecified long term insulin use status  -     AMB POC HEMOGLOBIN A1C  -  Results for orders placed or performed in visit on 08/17/17   AMB POC HEMOGLOBIN A1C   Result Value Ref Range    Hemoglobin A1c (POC) 6.4 %     -at goal    4. Dyslipidemia  5. Chronic renal insufficiency, unspecified stage  6. Ischemic cardiomyopathy  7. Chronic hepatitis C without hepatic coma (HCC)  8. Paroxysmal atrial fibrillation ([address]  -hold labs due to no insurance until October       Follow-up Disposition:  Return in about 4 months (around 12/17/2017).   Lab Results   Component Value Date/Time    Cholesterol, total 120 01/19/2017 03:50 AM    HDL Cholesterol 19 01/19/2017 03:50 AM    LDL, calculated 76 01/19/2017 03:50 AM    Triglyceride 125 01/19/2017 03:50 AM    CHOL/HDL Ratio 6.3 01/19/2017 03:50 AM     Lab Results   Component Value Date/Time    Hemoglobin A1c 13.5 01/19/2017 05:05 PM    Hemoglobin A1c 13.4 01/18/2017 11:11 PM    LDL, calculated 76 01/19/2017 03:50 AM    Creatinine 1.58 05/22/2017 12:00 AM          Discussed with patient goal of Diabetes to include:  HgA1C <7, LDL cholesterol <70, Blood pressure <130/80. Discussed with patient diet and weight management and to get regular exercise. Recommend yearly eye exams and daily foot care. The patient understands and agrees with the plan. I have discussed the diagnosis with the patient and the intended plan as seen in the above orders. The patient has received an after-visit summary and questions were answered concerning future plans. Medication Side Effects and Warnings were discussed with patient  Patient Labs were reviewed and or requested  Patient Past Records were reviewed and or requested    Adalberto Lee M.D. [address] [facility]    There are no Patient Instructions on file for this visit.

## 2017-08-17 NOTE — TELEPHONE ENCOUNTER
Telephone Call RE:  Appointment reminder     Outcome:     [x] Patient confirmed appointment   [] Patient rescheduled appointment for    [] Unable to reach   [] Left message              [] Other:       Mariaa Menendez

## 2017-08-21 ENCOUNTER — HOME CARE VISIT (OUTPATIENT)
Dept: SCHEDULING | Facility: HOME HEALTH | Age: 67
End: 2017-08-21

## 2017-08-21 PROCEDURE — G0299 HHS/HOSPICE OF RN EA 15 MIN: HCPCS

## 2017-08-22 ENCOUNTER — OFFICE VISIT (OUTPATIENT)
Dept: CARDIOLOGY CLINIC | Age: 67
End: 2017-08-22

## 2017-08-22 VITALS
HEIGHT: 70 IN | TEMPERATURE: 97.9 F | RESPIRATION RATE: 16 BRPM | BODY MASS INDEX: 25.14 KG/M2 | OXYGEN SATURATION: 98 % | HEART RATE: 75 BPM | WEIGHT: 175.6 LBS | SYSTOLIC BLOOD PRESSURE: 92 MMHG | DIASTOLIC BLOOD PRESSURE: 62 MMHG

## 2017-08-22 DIAGNOSIS — Z95.810 ICD (IMPLANTABLE CARDIOVERTER-DEFIBRILLATOR) IN PLACE: ICD-10-CM

## 2017-08-22 DIAGNOSIS — M79.605 PAIN IN BOTH LOWER EXTREMITIES: ICD-10-CM

## 2017-08-22 DIAGNOSIS — E13.40 NEUROPATHY DUE TO SECONDARY DIABETES (HCC): ICD-10-CM

## 2017-08-22 DIAGNOSIS — I25.5 ISCHEMIC CARDIOMYOPATHY: Primary | ICD-10-CM

## 2017-08-22 DIAGNOSIS — E11.8 TYPE 2 DIABETES MELLITUS WITH COMPLICATION, UNSPECIFIED LONG TERM INSULIN USE STATUS: ICD-10-CM

## 2017-08-22 DIAGNOSIS — I48.0 PAROXYSMAL ATRIAL FIBRILLATION (HCC): ICD-10-CM

## 2017-08-22 DIAGNOSIS — M79.604 PAIN IN BOTH LOWER EXTREMITIES: ICD-10-CM

## 2017-08-22 RX ORDER — SPIRONOLACTONE 25 MG/1
25 TABLET ORAL DAILY
Qty: 90 TAB | Refills: 1 | Status: SHIPPED | OUTPATIENT
Start: 2017-08-22 | End: 2018-03-29 | Stop reason: SDUPTHER

## 2017-08-22 NOTE — PROGRESS NOTES
[facility] Note      NAME:  Radha Nayak. :   1950   MRN:   [id number]   PCP:  Tereza Macias MD    Date:  2017     IMPRESSION/PLAN:    ICM, EF 25%, home inotrope, NYHA class II-III  Cont milrinone  0.3 mcg/kg/min  Continue Coreg and Spironolactone   Will try to increase Entresto again to 49/51mg- watch renal function closely  Plans to decrease milrinone to 0.25 mcg/kg/min at next visit if renal function stabilized. Cont Veltassa  Continue Bumex PRN  Check labs in 1 week  Pt to continue daily weights, low sodium diet and fluid consumption to 2 L/daily  Return to [facility] in 2-3 weeks    CAD s/p PCI  Continue ASA, Plavix, Coreg and statin per cardiology          Claudication  ABIs c/w moderate PAD bilaterally   Refer to vascular surgery due to bilateral foot numbness- patient wants to wait. CKD  Creatinine 1.9        PAF  Continue amiodarone and Coreg - managed per cardiology. Taper down protocol for    H/O lower GIB  Will need colonic polyp and AVM treated at some point - denies any bleeding issues  Will arrange for f/u once off Plavix    H/O Tobacco Abuse  Pt denies smoking  Reinforced importance of smoking cessation    Diabetes  Insulin management by PCP- has not seen Dr. Trevino Ours   Diabetic neuropathy Monitor Glucose; Discussed low sugar foods  Cymbalta 30 mg daily        Hepatitis C  Undetectable viral load    Subjective:     Mr. Ajay Kurtz is a 62 yo, C male w/ PMH CAD s/p PCI to left main and  of RCA 0n 17,  ICM (LVEF 10%), h/o PAF, s/p ICD, h/o Tob abuse, Hep C (undetectable viral load), h/o GIB w/ colonic polyp and AVM, who presents today for follow up. He presents today for HF follow up. Since his last appointment he continues to feel well and states that other then his claudication, he feels well. Mr Ajay Kurtz reports some new bilateral loss of sensation of his feet and persistent claudication.   He has not had any troubles with his ICD and follows with Dr Martins Said as OP. He sleeps on one pillow and denies orthopnea. He is performing daily weights. His weight is stable. He has a healthy appetite. He is watching his salt intakes and reading labels. He is drinking about 64 ounces a day. He is compliant with all his medications. He denies any tobacco use. He denies any alcohol or illicit drug use. ROS  Pt is admits he is not as active as he would like. He stated he enjoys fishing. He notes his BP and weight are down, but denies orthostatic sx. Pt denies HA, fevers, chills, diaphoresis, lightheadedness, dizziness, syncope, N/V, changes to appetite, changes to weight, early satiety, CP, palpitations, SOB, orthopnea, PND, constipation, edema, abdominal fullness, depression or anxiety, hematuria, BRBPR, melena. Objective:     Visit Vitals    BP 92/62 (BP 1 Location: Left arm, BP Patient Position: Sitting)    Pulse 75    Temp 97.9 °F (36.6 °C) (Oral)    Resp 16    Ht 5' 10\" (1.778 m)    Wt 175 lb 9.6 oz (79.7 kg)    SpO2 98%    BMI 25.2 kg/m2      Physical Exam  Gen:   WA, WN, NAD  HEENT:  EOMI,   Neck:   supple, trache midline, (-) JVD  CVS:      S1/S2  Pul:  CTA b/l (-) r,r,w  Abd:  +BS, soft, NT  Ext:  (-) edema, L PICC line site (-) erythema (-) swelling  Neuro:   No obvious deficits  ICD site R SCV:  Inc c/d/i, healing well, (-( erythema           Past History:     Past Medical History:   Diagnosis Date    Cardiomyopathy [facility] 1/20/2017    A. Echo (1/19/17):  EF 5-10% with severe GHK,. Mildly dil LA. Mild TR. PASP 46.    Chronic renal insufficiency 3/6/2017    Diabetes mellitus ([facility] 3/6/2017    Dyslipidemia 3/6/2017    A. FLP (1/19/17): Tot 120, , HDL 19, LDL 76 (no Rx).  H/O: GI bleed 3/6/2017    Hepatitis C 3/6/2017    Paroxysmal atrial fibrillation ([facility] 3/6/2017       Past Surgical History:   Procedure Laterality Date    COLONOSCOPY N/A 2/17/2017    COLONOSCOPY performed by Al Lee.  Vadim Medina MD at [facility] ENDOSCOPY    HX COLONOSCOPY      [facility] 2/2017       Social History   Substance Use Topics    Smoking status: Former Smoker     Packs/day: 2.00     Years: 45.00     Quit date: 1/20/2017    Smokeless tobacco: Never Used    Alcohol use No        Family History   Problem Relation Age of Onset    No Known Problems Mother     Cancer Father     Colon Cancer Father      Family history is not known per patient     Allergies: Allergies   Allergen Reactions    Heparin (Porcine) Unknown (comments)        Data Review:     Medications reviewed:    Current Outpatient Prescriptions   Medication Sig    spironolactone (ALDACTONE) 25 mg tablet Take 1 Tab by mouth daily.  insulin NPH/insulin regular (NOVOLIN 70/30, HUMULIN 70/30) 100 unit/mL (70-30) injection 10 Units by SubCUTAneous route two (2) times a day. Take 10 units in the morning and 8 units in the evening.  sacubitril-valsartan (ENTRESTO) 49 mg/51 mg tablet Take 1 Tab by mouth two (2) times a day.  milrinone (PRIMACOR) 20 mg/100 mL (200 mcg/mL) infusion 22.74 mcg/min by IntraVENous route continuous.  clopidogrel (PLAVIX) 75 mg tab Take 1 Tab by mouth daily.  amiodarone (CORDARONE) 200 mg tablet TAKE ONE TABLET BY MOUTH EVERY 12 HOURS    atorvastatin (LIPITOR) 10 mg tablet TAKE ONE TABLET BY MOUTH ONCE DAILY    carvedilol (COREG) 3.125 mg tablet TAKE ONE TABLET BY MOUTH TWICE DAILY WITH MEALS    bumetanide (BUMEX) 1 mg tablet Take 1 mg by mouth as needed.  sodium chloride (NORMAL SALINE FLUSH) 0.9 % 5-10 mL by IntraVENous Push route as needed.  acetaminophen (TYLENOL EXTRA STRENGTH) 500 mg tablet Take 1,000 mg by mouth every six (6) hours as needed for Pain. Indications: BACK PAIN    DULoxetine (CYMBALTA) 30 mg capsule Take 1 Cap by mouth daily.  aspirin delayed-release 81 mg tablet Take 1 Tab by mouth daily.     Insulin Syringes, Disposable, 1 mL syrg Pt to take 10 units w/ breakfast and 8 units w/ dinner    patiromer calcium sorbitex (VELTASSA) 8.4 gram powder Take 8.4 g by mouth daily.  magnesium oxide (MAG-OX) 400 mg tablet Take 1 Tab by mouth daily. No current facility-administered medications for this visit. Follow-up Disposition:  Return in about 2 weeks (around 9/5/2017).       Thank you for letting us see Mr Stella Oh with you,    Elina Cook, [facility] [address]

## 2017-08-24 NOTE — COMMUNICATION BODY
[facility] Clinic Note      NAME:  Grey Valenzuela. :   1950   MRN:   [id number]   PCP:  Karon Ho MD    Date:  2017     IMPRESSION/PLAN:    ICM, EF 25%, home inotrope, NYHA class II-III  Cont milrinone  0.3 mcg/kg/min  Continue Coreg and Spironolactone   Will try to increase Entresto again to 49/51mg- watch renal function closely  Plans to decrease milrinone to 0.25 mcg/kg/min at next visit if renal function stabilized. Cont Veltassa  Continue Bumex PRN  Check labs in 1 week  Pt to continue daily weights, low sodium diet and fluid consumption to 2 L/daily  Return to [facility] in 2-3 weeks    CAD s/p PCI  Continue ASA, Plavix, Coreg and statin per cardiology          Claudication  ABIs c/w moderate PAD bilaterally   Refer to vascular surgery due to bilateral foot numbness- patient wants to wait. CKD  Creatinine 1.9        PAF  Continue amiodarone and Coreg - managed per cardiology. Taper down protocol for    H/O lower GIB  Will need colonic polyp and AVM treated at some point - denies any bleeding issues  Will arrange for f/u once off Plavix    H/O Tobacco Abuse  Pt denies smoking  Reinforced importance of smoking cessation    Diabetes  Insulin management by PCP- has not seen Dr. Vazquez Reasons   Diabetic neuropathy Monitor Glucose; Discussed low sugar foods  Cymbalta 30 mg daily        Hepatitis C  Undetectable viral load    Subjective:     Mr. Stella Oh is a 60 yo, C male w/ PMH CAD s/p PCI to left main and  of RCA 0n 17,  ICM (LVEF 10%), h/o PAF, s/p ICD, h/o Tob abuse, Hep C (undetectable viral load), h/o GIB w/ colonic polyp and AVM, who presents today for follow up. He presents today for HF follow up. Since his last appointment he continues to feel well and states that other then his claudication, he feels well. Mr Stella Oh reports some new bilateral loss of sensation of his feet and persistent claudication.   He has not had any troubles with his ICD and follows with Dr Esthela Montalvo as OP. He sleeps on one pillow and denies orthopnea. He is performing daily weights. His weight is stable. He has a healthy appetite. He is watching his salt intakes and reading labels. He is drinking about 64 ounces a day. He is compliant with all his medications. He denies any tobacco use. He denies any alcohol or illicit drug use. ROS  Pt is admits he is not as active as he would like. He stated he enjoys fishing. He notes his BP and weight are down, but denies orthostatic sx. Pt denies HA, fevers, chills, diaphoresis, lightheadedness, dizziness, syncope, N/V, changes to appetite, changes to weight, early satiety, CP, palpitations, SOB, orthopnea, PND, constipation, edema, abdominal fullness, depression or anxiety, hematuria, BRBPR, melena. Objective:     Visit Vitals    BP 92/62 (BP 1 Location: Left arm, BP Patient Position: Sitting)    Pulse 75    Temp 97.9 °F (36.6 °C) (Oral)    Resp 16    Ht 5' 10\" (1.778 m)    Wt 175 lb 9.6 oz (79.7 kg)    SpO2 98%    BMI 25.2 kg/m2      Physical Exam  Gen:   WA, WN, NAD  HEENT:  EOMI,   Neck:   supple, trache midline, (-) JVD  CVS:      S1/S2  Pul:  CTA b/l (-) r,r,w  Abd:  +BS, soft, NT  Ext:  (-) edema, L PICC line site (-) erythema (-) swelling  Neuro:   No obvious deficits  ICD site R SCV:  Inc c/d/i, healing well, (-( erythema           Past History:     Past Medical History:   Diagnosis Date    [facility]    A. Echo (1/19/17):  EF 5-10% with severe GHK,. Mildly dil LA. Mild TR. PASP 46.    Chronic renal insufficiency 3/6/2017    Diabetes mellitus ([facility] 3/6/2017    Dyslipidemia 3/6/2017    A. FLP (1/19/17): Tot 120, , HDL 19, LDL 76 (no Rx).  H/O: GI bleed 3/6/2017    Hepatitis C 3/6/2017    Paroxysmal atrial fibrillation ([facility] 3/6/2017       Past Surgical History:   Procedure Laterality Date    COLONOSCOPY N/A 2/17/2017    COLONOSCOPY performed by Glorianne Livers. Marylee Awkward, MD at [facility] ENDOSCOPY    HX COLONOSCOPY      [facility] 2/2017       Social History   Substance Use Topics    Smoking status: Former Smoker     Packs/day: 2.00     Years: 45.00     Quit date: 1/20/2017    Smokeless tobacco: Never Used    Alcohol use No        Family History   Problem Relation Age of Onset    No Known Problems Mother     Cancer Father     Colon Cancer Father      Family history is not known per patient     Allergies: Allergies   Allergen Reactions    Heparin (Porcine) Unknown (comments)        Data Review:     Medications reviewed:    Current Outpatient Prescriptions   Medication Sig    spironolactone (ALDACTONE) 25 mg tablet Take 1 Tab by mouth daily.  insulin NPH/insulin regular (NOVOLIN 70/30, HUMULIN 70/30) 100 unit/mL (70-30) injection 10 Units by SubCUTAneous route two (2) times a day. Take 10 units in the morning and 8 units in the evening.  sacubitril-valsartan (ENTRESTO) 49 mg/51 mg tablet Take 1 Tab by mouth two (2) times a day.  milrinone (PRIMACOR) 20 mg/100 mL (200 mcg/mL) infusion 22.74 mcg/min by IntraVENous route continuous.  clopidogrel (PLAVIX) 75 mg tab Take 1 Tab by mouth daily.  amiodarone (CORDARONE) 200 mg tablet TAKE ONE TABLET BY MOUTH EVERY 12 HOURS    atorvastatin (LIPITOR) 10 mg tablet TAKE ONE TABLET BY MOUTH ONCE DAILY    carvedilol (COREG) 3.125 mg tablet TAKE ONE TABLET BY MOUTH TWICE DAILY WITH MEALS    bumetanide (BUMEX) 1 mg tablet Take 1 mg by mouth as needed.  sodium chloride (NORMAL SALINE FLUSH) 0.9 % 5-10 mL by IntraVENous Push route as needed.  acetaminophen (TYLENOL EXTRA STRENGTH) 500 mg tablet Take 1,000 mg by mouth every six (6) hours as needed for Pain. Indications: BACK PAIN    DULoxetine (CYMBALTA) 30 mg capsule Take 1 Cap by mouth daily.  aspirin delayed-release 81 mg tablet Take 1 Tab by mouth daily.     Insulin Syringes, Disposable, 1 mL syrg Pt to take 10 units w/ breakfast and 8 units w/ dinner    patiromer calcium sorbitex (VELTASSA) 8.4 gram powder Take 8.4 g by mouth daily.  magnesium oxide (MAG-OX) 400 mg tablet Take 1 Tab by mouth daily. No current facility-administered medications for this visit. Follow-up Disposition:  Return in about 2 weeks (around 9/5/2017).       Thank you for letting us see Mr Erica Monroe with you,    Valeriy Castañeda, [facility]-BC  Jennifer Roman [phone]

## 2017-08-25 ENCOUNTER — HOME CARE VISIT (OUTPATIENT)
Dept: HOME HEALTH SERVICES | Facility: HOME HEALTH | Age: 67
End: 2017-08-25

## 2017-08-25 DIAGNOSIS — I25.5 ISCHEMIC CARDIOMYOPATHY: Primary | ICD-10-CM

## 2017-08-25 RX ORDER — MILRINONE LACTATE 0.2 MG/ML
0.25 INJECTION, SOLUTION INTRAVENOUS CONTINUOUS
Qty: 100 ML | Refills: 1 | Status: SHIPPED | OUTPATIENT
Start: 2017-08-25 | End: 2017-09-06 | Stop reason: SDUPTHER

## 2017-08-26 ENCOUNTER — HOME CARE VISIT (OUTPATIENT)
Dept: SCHEDULING | Facility: HOME HEALTH | Age: 67
End: 2017-08-26

## 2017-08-26 VITALS
HEART RATE: 78 BPM | OXYGEN SATURATION: 98 % | RESPIRATION RATE: 16 BRPM | DIASTOLIC BLOOD PRESSURE: 65 MMHG | TEMPERATURE: 98.1 F | SYSTOLIC BLOOD PRESSURE: 119 MMHG

## 2017-08-26 PROCEDURE — G0299 HHS/HOSPICE OF RN EA 15 MIN: HCPCS

## 2017-08-28 VITALS
SYSTOLIC BLOOD PRESSURE: 104 MMHG | HEART RATE: 82 BPM | TEMPERATURE: 97.9 F | DIASTOLIC BLOOD PRESSURE: 68 MMHG | RESPIRATION RATE: 18 BRPM | OXYGEN SATURATION: 97 %

## 2017-08-29 ENCOUNTER — TELEPHONE (OUTPATIENT)
Dept: CARDIOLOGY CLINIC | Age: 67
End: 2017-08-29

## 2017-08-29 ENCOUNTER — DOCUMENTATION ONLY (OUTPATIENT)
Dept: CARDIOLOGY CLINIC | Age: 67
End: 2017-08-29

## 2017-08-29 NOTE — TELEPHONE ENCOUNTER
I spoke with patient regarding his patient assistance for Veltassa. Per Marilin Steward, patient has been approved. He needs to call the company and arrange for delivery of the medication. Patient has the needed information and he will call for his delivery.

## 2017-08-29 NOTE — PROGRESS NOTES
called [employer] and was informed the patient was approved for free veltassa and they have made multiple attempts to reach the patient and schedule his first shipment. They have been unable to get in touch with the patient.  confirmed they have the correct phone numbers on file for the patient.  called and left the patient a voicemail asking him to return 's phone call. He called back and shared he doesn't answer phone calls/voicemails from people he doesn't know as he does not like telemarketers.  gave him the [employer] phone number and asked him to call and schedule his first shipment. He reports he will do so today.     Ciara Segura, MSW, LCSW    Clinical    Jennifer Roman [phone]

## 2017-08-31 ENCOUNTER — HOME CARE VISIT (OUTPATIENT)
Dept: SCHEDULING | Facility: HOME HEALTH | Age: 67
End: 2017-08-31

## 2017-08-31 ENCOUNTER — TELEPHONE (OUTPATIENT)
Dept: CARDIOLOGY CLINIC | Age: 67
End: 2017-08-31

## 2017-08-31 VITALS
HEART RATE: 66 BPM | OXYGEN SATURATION: 98 % | WEIGHT: 173 LBS | RESPIRATION RATE: 14 BRPM | SYSTOLIC BLOOD PRESSURE: 118 MMHG | DIASTOLIC BLOOD PRESSURE: 58 MMHG | TEMPERATURE: 98.4 F | BODY MASS INDEX: 24.82 KG/M2

## 2017-08-31 PROCEDURE — G0299 HHS/HOSPICE OF RN EA 15 MIN: HCPCS

## 2017-08-31 NOTE — TELEPHONE ENCOUNTER
Telephone Call RE:  Appointment reminder     Outcome:     [x] Patient confirmed appointment   [] Patient rescheduled appointment for    [] Unable to reach   [] Left message              [] Other:     Patient had to be added on Tuesday.   He was on my calendar and not the other      [facility]

## 2017-09-05 ENCOUNTER — OFFICE VISIT (OUTPATIENT)
Dept: CARDIOLOGY CLINIC | Age: 67
End: 2017-09-05

## 2017-09-05 VITALS
OXYGEN SATURATION: 98 % | RESPIRATION RATE: 16 BRPM | HEIGHT: 70 IN | HEART RATE: 80 BPM | BODY MASS INDEX: 25.34 KG/M2 | DIASTOLIC BLOOD PRESSURE: 64 MMHG | TEMPERATURE: 98 F | SYSTOLIC BLOOD PRESSURE: 108 MMHG | WEIGHT: 177 LBS

## 2017-09-05 DIAGNOSIS — E11.40 CONTROLLED TYPE 2 DIABETES MELLITUS WITH DIABETIC NEUROPATHY, WITHOUT LONG-TERM CURRENT USE OF INSULIN (HCC): ICD-10-CM

## 2017-09-05 DIAGNOSIS — I25.5 ISCHEMIC CARDIOMYOPATHY: Primary | ICD-10-CM

## 2017-09-05 DIAGNOSIS — R76.8 HCV ANTIBODY POSITIVE: ICD-10-CM

## 2017-09-05 DIAGNOSIS — E87.5 HYPERKALEMIA: ICD-10-CM

## 2017-09-05 DIAGNOSIS — Z95.810 ICD (IMPLANTABLE CARDIOVERTER-DEFIBRILLATOR) IN PLACE: ICD-10-CM

## 2017-09-05 NOTE — MR AVS SNAPSHOT
Visit Information Date & Time Provider Department Dept. Phone Encounter #  
 9/5/2017  1:00 PM Filiberto Ashton MD [address] [id number] Your Appointments 9/18/2017  2:20 PM  
ESTABLISHED PATIENT with Harpreet Craft MD  
[facility] ([facility] SCHEDULING) Appt Note: 5 mo fup; 5 mo [address] [facility] [address]  
[phone]  
  
   
 [address] [facility] [address] [facility] [address]  
  
    
 12/4/2017  9:30 AM  
ESTABLISHED PATIENT with Rodríguez Willis MD  
[address] [facility]) Appt Note: 4mo fuv for labs [address] [address]  
[phone]  
  
   
 [address] [address]  
  
    
 7/25/2018 10:00 AM  
ESTABLISHED PATIENT with Panchito Hendricks MD  
[facility] ([facility]) Appt Note: annual  
 [address] [facility] [address] [facility]  
[address] [facility] [id number] [place] [address] Upcoming Health Maintenance Date Due  
 FOOT EXAM Q1 11/23/1962 MICROALBUMIN Q1 11/23/1962 EYE EXAM RETINAL OR DILATED Q1 11/23/1962 Pneumococcal 19-64 Medium Risk (1 of 1 - PPSV23) 11/23/1971 DTaP/Tdap/Td series (1 - Tdap) 11/23/1973 ZOSTER VACCINE AGE 60> 9/23/2012 INFLUENZA AGE 9 TO ADULT 8/1/2017 LIPID PANEL Q1 1/19/2018 FOBT Q 1 YEAR AGE 50-75 2/16/2018 HEMOGLOBIN A1C Q6M 2/17/2018 Allergies as of 9/5/2017  Review Complete On: 9/5/2017 By: Zenobia Salas RN Severity Noted Reaction Type Reactions Heparin (Porcine)  02/07/2017    Unknown (comments) Current Immunizations  Reviewed on 2/14/2017 No immunizations on file. Not reviewed this visit You Were Diagnosed With   
  
 Codes Comments Ischemic cardiomyopathy    -  Primary ICD-10-CM: I25.5 ICD-9-CM: 414.8 Hyperkalemia     ICD-10-CM: E87.5 ICD-9-CM: 276.7 Vitals BP Pulse Temp Resp Height(growth percentile) Weight(growth percentile) 108/64 (BP 1 Location: Right arm, BP Patient Position: Sitting) 80 98 °F (36.7 °C) 16 5' 10\" (1.778 m) 177 lb (80.3 kg) SpO2 BMI Smoking Status 98% 25.4 kg/m2 Former Smoker BMI and BSA Data Body Mass Index Body Surface Area  
 25.4 kg/m 2 1.99 m 2 Preferred Pharmacy Pharmacy Name Phone [facility] [phone] Your Updated Medication List  
  
   
This list is accurate as of: 9/5/17  2:13 PM.  Always use your most recent med list.  
  
  
  
  
 amiodarone 200 mg tablet Commonly known as:  CORDARONE  
TAKE ONE TABLET BY MOUTH EVERY 12 HOURS  
  
 aspirin delayed-release 81 mg tablet Take 1 Tab by mouth daily. atorvastatin 10 mg tablet Commonly known as:  LIPITOR  
TAKE ONE TABLET BY MOUTH ONCE DAILY  
  
 bumetanide 1 mg tablet Commonly known as:  Garfield Cowen Take 1 mg by mouth as needed. carvedilol 3.125 mg tablet Commonly known as:  COREG  
TAKE ONE TABLET BY MOUTH TWICE DAILY WITH MEALS  
  
 clopidogrel 75 mg Tab Commonly known as:  PLAVIX Take 1 Tab by mouth daily. DULoxetine 30 mg capsule Commonly known as:  CYMBALTA Take 1 Cap by mouth daily. insulin NPH/insulin regular 100 unit/mL (70-30) injection Commonly known as:  NOVOLIN 70/30, HUMULIN 70/30  
10 Units by SubCUTAneous route two (2) times a day. Take 10 units in the morning and 8 units in the evening. Insulin Syringes (Disposable) 1 mL Syrg Pt to take 10 units w/ breakfast and 8 units w/ dinner  
  
 milrinone 20 mg/100 mL (200 mcg/mL) infusion Commonly known as:  PRIMACOR  
18.95 mcg/min by IntraVENous route continuous. NORMAL SALINE FLUSH 0.9 % Generic drug:  sodium chloride 5-10 mL by IntraVENous Push route as needed. * patiromer calcium sorbitex 8.4 gram powder Commonly known as:  VELTASSA Take 8.4 g by mouth daily. * patiromer calcium sorbitex 8.4 gram powder Commonly known as:  VELTASSA Take 8.4 g by mouth now for 1 dose. sacubitril-valsartan 49-51 mg Tab tablet Commonly known as:  ENTRESTO Take 1 Tab by mouth two (2) times a day. spironolactone 25 mg tablet Commonly known as:  ALDACTONE Take 1 Tab by mouth daily. TYLENOL EXTRA STRENGTH 500 mg tablet Generic drug:  acetaminophen Take 1,000 mg by mouth every six (6) hours as needed for Pain. Indications: BACK PAIN  
  
 * Notice: This list has 2 medication(s) that are the same as other medications prescribed for you. Read the directions carefully, and ask your doctor or other care provider to review them with you. We Performed the Following METABOLIC PANEL, COMPREHENSIVE [88254 CPT(R)] NT-PRO BNP [id number] CPT(R)] To-Do List   
 09/07/2017 To Be Determined Appointment with Ann Naranjo RN at [place] [address]  
  
 09/11/2017 To Be Determined Appointment with Ann Naranjo RN at [place] [address]  
  
 09/18/2017 To Be Determined Appointment with Ann Naranjo RN at [place] [address]  
  
 09/25/2017 To Be Determined Appointment with Ann Naranjo RN at [place] [address]  
  
 10/02/2017 To Be Determined Appointment with Ann Naranjo RN at [place] [address]  
  
 10/09/2017 To Be Determined Appointment with Ann Naranjo RN at [place] [address]  
  
 10/16/2017 To Be Determined Appointment with Ann Naranjo RN at [place] [address] Patient Instructions Continue your current medications. We will review your lab results with you tomorrow - if they are acceptable, your milrinone will be decreased. Continue to weigh yourself daily. Call us for weight gain > 2 lbs in one night or > 5 lbs in one week. Please call the [address] [place] [address] representatives to receive your shipment. Call our office if you are unable to reach them. Follow up in 2 weeks. Introducing [facility]! [facility] introduces [employer] patient portal. Now you can access parts of your medical record, email your doctor's office, and request medication refills online. 1. In your internet browser, go to [email or web address] 2. Click on the First Time User? Click Here link in the Sign In box. You will see the New Member Sign Up page. 3. Enter your [employer] Access Code exactly as it appears below. You will not need to use this code after youve completed the sign-up process. If you do not sign up before the expiration date, you must request a new code. · [employer] Access Code: [email or web address] Expires: 10/22/2017 12:10 PM 
 
4. Enter the last four digits of your Social Security Number (xxxx) and Date of Birth (mm/dd/yyyy) as indicated and click Submit. You will be taken to the next sign-up page. 5. Create a [employer] ID. This will be your [employer] login ID and cannot be changed, so think of one that is secure and easy to remember. 6. Create a [employer] password. You can change your password at any time. 7. Enter your Password Reset Question and Answer. This can be used at a later time if you forget your password. 8. Enter your e-mail address. You will receive e-mail notification when new information is available in [address] 9. Click Sign Up. You can now view and download portions of your medical record. 10. Click the Download Summary menu link to download a portable copy of your medical information.  
 
If you have questions, please visit the Frequently Asked Questions section of the [facility]. Remember, [employer] is NOT to be used for urgent needs. For medical emergencies, dial 911. Now available from your iPhone and Android! Please provide this summary of care documentation to your next provider. Your primary care clinician is listed as MICHAEL BALLARD. If you have any questions after today's visit, please call [phone].

## 2017-09-05 NOTE — PROGRESS NOTES
[facility] Clinic Note      NAME:  Krysten Engel. :   1950   MRN:   [id number]   PCP:  Ilya Zayas MD    Date:  2017     IMPRESSION/PLAN:    ICM, EF 25%, home inotrope, NYHA class II-III  Decrease milrinone to 0.2 mcg/kg/min if Cr OK  Continue Coreg and Spironolactone   Continue Entresto 49/51mg - watch renal function closely  Cont Veltassa - samples given, patient instructed to complete his requirements for patient assistance program  Continue Bumex PRN - rarely uses  Labs today  Pt to continue daily weights, low sodium diet and fluid consumption to 2 L/daily  Return to [facility] in 2    CAD s/p PCI  Continue ASA, Plavix, Coreg and statin per cardiology          Claudication  ABIs c/w moderate PAD bilaterally   Refer to vascular surgery due to bilateral foot numbness- patient wants to wait. CKD  Creatinine 1.58  Monitor closely while weaning milrinone     PAF  Continue amiodarone and Coreg - managed per cardiology. H/O lower GIB  Will need colonic polyp and AVM treated at some point - denies any bleeding issues  Will arrange for f/u once off Plavix    H/O Tobacco Abuse  Pt denies smoking  Reinforced importance of smoking cessation    Diabetes  Insulin management by PCP- has not seen Dr. Dueñas Locus   Cymbalta 30 mg daily        Hepatitis C  Undetectable viral load    Subjective:     Mr. Geraldine Daniel is a 60 yo, C male w/ PMH CAD s/p PCI to left main and  of RCA 0n 17,  ICM (LVEF 10%), h/o PAF, s/p ICD, h/o Tob abuse, Hep C (undetectable viral load), h/o GIB w/ colonic polyp and AVM, who presents today for follow up. Mr Geraldine Daniel feels \"better each time\" his milrinone is decreased. He has increased energy levels and his leg pain has resolved. He does note some palpitations with exercise. He stays active throughout the day by walking around [employer], performing housework, and grilling for his family. He is compliant with his medications and daily weights. He denies NAVARRO, dizziness, CP, PND, edema, orthopnea, early satiety, or N/V. He has a healthy appetite. He is watching his salt intakes and reading labels. He is drinking about 64 ounces a day. He denies any tobacco use. He denies any alcohol or illicit drug use. ROS  Pt denies HA, fevers, chills, diaphoresis, lightheadedness, dizziness, syncope, N/V, changes to appetite, changes to weight, early satiety, CP, palpitations, SOB, orthopnea, PND, constipation, edema, abdominal fullness, depression or anxiety, hematuria, BRBPR, melena. Objective:     Visit Vitals    /64 (BP 1 Location: Right arm, BP Patient Position: Sitting)    Pulse 80    Temp 98 °F (36.7 °C)    Resp 16    Ht 5' 10\" (1.778 m)    Wt 177 lb (80.3 kg)    SpO2 98%    BMI 25.4 kg/m2      Physical Exam  Gen:   WA, WN, NAD  HEENT:  EOMI,   Neck:   supple, trache midline, (-) JVD  CVS:      S1/S2  Pul:  CTA b/l (-) r,r,w  Abd:  +BS, soft, NT  Ext:  (-) edema, L PICC line site (-) erythema (-) swelling  Neuro:   No obvious deficits  ICD site R SCV:  Inc c/d/i, healing well, (-( erythema           Past History:     Past Medical History:   Diagnosis Date    Cardiomyopathy ([facility] 1/20/2017    A. Echo (1/19/17):  EF 5-10% with severe GHK,. Mildly dil LA. Mild TR. PASP 46.    Chronic renal insufficiency 3/6/2017    Diabetes mellitus ([address] 3/6/2017    Dyslipidemia 3/6/2017    A. FLP (1/19/17): Tot 120, , HDL 19, LDL 76 (no Rx).  H/O: GI bleed 3/6/2017    Hepatitis C 3/6/2017    Paroxysmal atrial fibrillation ([facility] 3/6/2017       Past Surgical History:   Procedure Laterality Date    COLONOSCOPY N/A 2/17/2017    COLONOSCOPY performed by Lord Garcia.  Joseph Jackson MD at [facility] ENDOSCOPY    HX COLONOSCOPY      [facility] 2/2017       Social History   Substance Use Topics    Smoking status: Former Smoker     Packs/day: 2.00     Years: 45.00     Quit date: 1/20/2017    Smokeless tobacco: Never Used    Alcohol use No        Family History   Problem Relation Age of Onset    No Known Problems Mother     Cancer Father     Colon Cancer Father      Family history is not known per patient     Allergies: Allergies   Allergen Reactions    Heparin (Porcine) Unknown (comments)        Data Review:     Medications reviewed:    Current Outpatient Prescriptions   Medication Sig    patiromer calcium sorbitex (VELTASSA) 8.4 gram powder Take 8.4 g by mouth daily.  milrinone (PRIMACOR) 20 mg/100 mL (200 mcg/mL) infusion 18.95 mcg/min by IntraVENous route continuous.  spironolactone (ALDACTONE) 25 mg tablet Take 1 Tab by mouth daily.  insulin NPH/insulin regular (NOVOLIN 70/30, HUMULIN 70/30) 100 unit/mL (70-30) injection 10 Units by SubCUTAneous route two (2) times a day. Take 10 units in the morning and 8 units in the evening.  sacubitril-valsartan (ENTRESTO) 49 mg/51 mg tablet Take 1 Tab by mouth two (2) times a day.  clopidogrel (PLAVIX) 75 mg tab Take 1 Tab by mouth daily.  amiodarone (CORDARONE) 200 mg tablet TAKE ONE TABLET BY MOUTH EVERY 12 HOURS    atorvastatin (LIPITOR) 10 mg tablet TAKE ONE TABLET BY MOUTH ONCE DAILY    carvedilol (COREG) 3.125 mg tablet TAKE ONE TABLET BY MOUTH TWICE DAILY WITH MEALS    bumetanide (BUMEX) 1 mg tablet Take 1 mg by mouth as needed.  sodium chloride (NORMAL SALINE FLUSH) 0.9 % 5-10 mL by IntraVENous Push route as needed.  acetaminophen (TYLENOL EXTRA STRENGTH) 500 mg tablet Take 1,000 mg by mouth every six (6) hours as needed for Pain. Indications: BACK PAIN    DULoxetine (CYMBALTA) 30 mg capsule Take 1 Cap by mouth daily.  aspirin delayed-release 81 mg tablet Take 1 Tab by mouth daily.  Insulin Syringes, Disposable, 1 mL syrg Pt to take 10 units w/ breakfast and 8 units w/ dinner     No current facility-administered medications for this visit. Follow-up Disposition:  Return in about 2 weeks (around 9/19/2017).       Thank you for letting us see Mr Tom Serna with hanane,    Jocelyn Kebede, [facility]-BC  Jennifer Roman 1721

## 2017-09-05 NOTE — PATIENT INSTRUCTIONS
Continue your current medications. We will review your lab results with you tomorrow - if they are acceptable, your milrinone will be decreased. Continue to weigh yourself daily. Call us for weight gain > 2 lbs in one night or > 5 lbs in one week. Please call the [address] [place] [address] representatives to receive your shipment. Call our office if you are unable to reach them. Follow up in 2 weeks.

## 2017-09-06 ENCOUNTER — TELEPHONE (OUTPATIENT)
Dept: CARDIOLOGY CLINIC | Age: 67
End: 2017-09-06

## 2017-09-06 DIAGNOSIS — I25.5 ISCHEMIC CARDIOMYOPATHY: ICD-10-CM

## 2017-09-06 LAB
ALBUMIN SERPL-MCNC: 4 G/DL (ref 3.6–4.8)
ALBUMIN/GLOB SERPL: 1.5 {RATIO} (ref 1.2–2.2)
ALP SERPL-CCNC: 71 IU/L (ref 39–117)
ALT SERPL-CCNC: 30 IU/L (ref 0–44)
AST SERPL-CCNC: 25 IU/L (ref 0–40)
BILIRUB SERPL-MCNC: 0.4 MG/DL (ref 0–1.2)
BUN SERPL-MCNC: 29 MG/DL (ref 8–27)
BUN/CREAT SERPL: 18 (ref 10–24)
CALCIUM SERPL-MCNC: 8.8 MG/DL (ref 8.6–10.2)
CHLORIDE SERPL-SCNC: 106 MMOL/L (ref 96–106)
CO2 SERPL-SCNC: 21 MMOL/L (ref 18–29)
CREAT SERPL-MCNC: 1.65 MG/DL (ref 0.76–1.27)
GLOBULIN SER CALC-MCNC: 2.6 G/DL (ref 1.5–4.5)
GLUCOSE SERPL-MCNC: 165 MG/DL (ref 65–99)
NT-PROBNP SERPL-MCNC: 598 PG/ML (ref 0–210)
POTASSIUM SERPL-SCNC: 4.3 MMOL/L (ref 3.5–5.2)
PROT SERPL-MCNC: 6.6 G/DL (ref 6–8.5)
SODIUM SERPL-SCNC: 142 MMOL/L (ref 134–144)

## 2017-09-06 RX ORDER — MILRINONE LACTATE 0.2 MG/ML
0.2 INJECTION, SOLUTION INTRAVENOUS CONTINUOUS
Qty: 100 ML | Refills: 1 | Status: SHIPPED | OUTPATIENT
Start: 2017-09-06 | End: 2017-10-19 | Stop reason: ALTCHOICE

## 2017-09-06 NOTE — TELEPHONE ENCOUNTER
----- Message from Vic Jarrell NP sent at 9/6/2017  1:35 PM EDT -----  Correction - his milrinone is currently 0.25 mcg/kg/min. I'm decreasing it to 0.2 mcg/kg/min. Thanks! I spoke with [facility] and updated the order with them-they will have [facility] RN adjust the infusion tomorrow.

## 2017-09-09 ENCOUNTER — HOME CARE VISIT (OUTPATIENT)
Dept: SCHEDULING | Facility: HOME HEALTH | Age: 67
End: 2017-09-09

## 2017-09-09 PROCEDURE — G0300 HHS/HOSPICE OF LPN EA 15 MIN: HCPCS

## 2017-09-11 VITALS
OXYGEN SATURATION: 99 % | TEMPERATURE: 98.3 F | HEART RATE: 79 BPM | SYSTOLIC BLOOD PRESSURE: 110 MMHG | DIASTOLIC BLOOD PRESSURE: 62 MMHG

## 2017-09-12 ENCOUNTER — HOME CARE VISIT (OUTPATIENT)
Dept: SCHEDULING | Facility: HOME HEALTH | Age: 67
End: 2017-09-12

## 2017-09-12 PROCEDURE — G0299 HHS/HOSPICE OF RN EA 15 MIN: HCPCS

## 2017-09-14 ENCOUNTER — TELEPHONE (OUTPATIENT)
Dept: CARDIOLOGY CLINIC | Age: 67
End: 2017-09-14

## 2017-09-14 NOTE — TELEPHONE ENCOUNTER
Called patient to notify him that his cymbalta has arrived. He will pick it up next week when he comes for an appointment.

## 2017-09-15 ENCOUNTER — HOME CARE VISIT (OUTPATIENT)
Dept: SCHEDULING | Facility: HOME HEALTH | Age: 67
End: 2017-09-15

## 2017-09-15 PROCEDURE — G0299 HHS/HOSPICE OF RN EA 15 MIN: HCPCS

## 2017-09-17 VITALS
OXYGEN SATURATION: 98 % | HEART RATE: 76 BPM | WEIGHT: 173 LBS | SYSTOLIC BLOOD PRESSURE: 102 MMHG | TEMPERATURE: 98.1 F | RESPIRATION RATE: 18 BRPM | DIASTOLIC BLOOD PRESSURE: 52 MMHG | BODY MASS INDEX: 24.82 KG/M2

## 2017-09-17 VITALS
HEART RATE: 80 BPM | TEMPERATURE: 98.4 F | RESPIRATION RATE: 18 BRPM | OXYGEN SATURATION: 98 % | DIASTOLIC BLOOD PRESSURE: 64 MMHG | SYSTOLIC BLOOD PRESSURE: 120 MMHG

## 2017-09-18 ENCOUNTER — OFFICE VISIT (OUTPATIENT)
Dept: CARDIOLOGY CLINIC | Age: 67
End: 2017-09-18

## 2017-09-18 VITALS
SYSTOLIC BLOOD PRESSURE: 112 MMHG | RESPIRATION RATE: 16 BRPM | DIASTOLIC BLOOD PRESSURE: 62 MMHG | BODY MASS INDEX: 25.43 KG/M2 | WEIGHT: 177.6 LBS | HEIGHT: 70 IN | OXYGEN SATURATION: 99 % | HEART RATE: 68 BPM

## 2017-09-18 DIAGNOSIS — I25.10 CORONARY ARTERY DISEASE INVOLVING NATIVE HEART WITHOUT ANGINA PECTORIS, UNSPECIFIED VESSEL OR LESION TYPE: Primary | ICD-10-CM

## 2017-09-18 DIAGNOSIS — I73.9 PERIPHERAL VASCULAR DISEASE (HCC): ICD-10-CM

## 2017-09-18 DIAGNOSIS — I25.5 ISCHEMIC CARDIOMYOPATHY: ICD-10-CM

## 2017-09-18 DIAGNOSIS — E78.5 DYSLIPIDEMIA: ICD-10-CM

## 2017-09-18 DIAGNOSIS — E11.8 TYPE 2 DIABETES MELLITUS WITH COMPLICATION, UNSPECIFIED LONG TERM INSULIN USE STATUS: ICD-10-CM

## 2017-09-18 DIAGNOSIS — I48.0 PAROXYSMAL ATRIAL FIBRILLATION (HCC): ICD-10-CM

## 2017-09-18 PROBLEM — Z95.810 ICD (IMPLANTABLE CARDIOVERTER-DEFIBRILLATOR), SINGLE, IN SITU: Status: RESOLVED | Noted: 2017-07-12 | Resolved: 2017-09-18

## 2017-09-18 NOTE — PATIENT INSTRUCTIONS
popAD Activation    Thank you for requesting access to popAD. Please follow the instructions below to securely access and download your online medical record. popAD allows you to send messages to your doctor, view your test results, renew your prescriptions, schedule appointments, and more. How Do I Sign Up? 1. In your internet browser, go to [email or web address]  2. Click on the First Time User? Click Here link in the Sign In box. You will be redirect to the New Member Sign Up page. 3. Enter your popAD Access Code exactly as it appears below. You will not need to use this code after youve completed the sign-up process. If you do not sign up before the expiration date, you must request a new code. popAD Access Code: [email or web address]  Expires: 10/22/2017 12:10 PM (This is the date your popAD access code will )    4. Enter the last four digits of your Social Security Number (xxxx) and Date of Birth (mm/dd/yyyy) as indicated and click Submit. You will be taken to the next sign-up page. 5. Create a popAD ID. This will be your popAD login ID and cannot be changed, so think of one that is secure and easy to remember. 6. Create a popAD password. You can change your password at any time. 7. Enter your Password Reset Question and Answer. This can be used at a later time if you forget your password. 8. Enter your e-mail address. You will receive e-mail notification when new information is available in [address]. 9. Click Sign Up. You can now view and download portions of your medical record. 10. Click the Download Summary menu link to download a portable copy of your medical information. Additional Information    If you have questions, please visit the Frequently Asked Questions section of the popAD website at [email or web address] Remember, popAD is NOT to be used for urgent needs. For medical emergencies, dial 911.            Learning About Coronary Artery Disease (CAD)  What is coronary artery disease? Coronary artery disease (CAD) occurs when plaque builds up in the arteries that bring oxygen-rich blood to your heart. Plaque is a fatty substance made of cholesterol, calcium, and other substances in the blood. This process is called hardening of the arteries, or atherosclerosis. What happens when you have coronary artery disease? · Plaque may narrow the coronary arteries. Narrowed arteries cause poor blood flow. This can lead to angina symptoms such as chest pain or discomfort. If blood flow is completely blocked, you could have a heart attack. · You can slow CAD and reduce the risk of future problems by making changes in your lifestyle. These include quitting smoking and eating heart-healthy foods. · Treatments for CAD, along with changes in your lifestyle, can help you live a longer and healthier life. How can you prevent coronary artery disease? · Do not smoke. It may be the best thing you can do to prevent heart disease. If you need help quitting, talk to your doctor about stop-smoking programs and medicines. These can increase your chances of quitting for good. · Be active. Get at least 30 minutes of exercise on most days of the week. Walking is a good choice. You also may want to do other activities, such as running, swimming, cycling, or playing tennis or team sports. · Eat heart-healthy foods. Eat more fruits and vegetables and less foods that contain saturated and trans fats. Limit alcohol, sodium, and sweets. · Stay at a healthy weight. Lose weight if you need to. · Manage other health problems such as diabetes, high blood pressure, and high cholesterol. · Manage stress. Stress can hurt your heart. To keep stress low, talk about your problems and feelings. Don't keep your feelings hidden. · If you have talked about it with your doctor, take a low-dose aspirin every day.  Aspirin can help certain people lower their risk of a heart attack or stroke. But taking aspirin isn't right for everyone, because it can cause serious bleeding. Do not start taking daily aspirin unless your doctor knows about it. How is coronary artery disease treated? · Your doctor will suggest that you make lifestyle changes. For example, your doctor may ask you to eat healthy foods, quit smoking, lose extra weight, and be more active. · You will have to take medicines. · Your doctor may suggest a procedure to open narrowed or blocked arteries. This is called angioplasty. Or your doctor may suggest using healthy blood vessels to create detours around narrowed or blocked arteries. This is called bypass surgery. Follow-up care is a key part of your treatment and safety. Be sure to make and go to all appointments, and call your doctor if you are having problems. It's also a good idea to know your test results and keep a list of the medicines you take. Where can you learn more? Go to http://erik-dianna.info/. Enter [phone] in the search box to learn more about \"Learning About Coronary Artery Disease (CAD). \"  Current as of: December 28, 2016  Content Version: 11.3  © 7728-2378 [employer]. Care instructions adapted under license by [employer] (which disclaims liability or warranty for this information). If you have questions about a medical condition or this instruction, always ask your healthcare professional. [address] any warranty or liability for your use of this information.

## 2017-09-18 NOTE — PROGRESS NOTES
Subjective:     Problem List  Date Reviewed: 9/18/2017          Codes Class Noted    Peripheral vascular disease ([facility] ICD-10-CM: I73.9  ICD-9-CM: 443.9  9/18/2017    Overview Signed 9/18/2017  3:05 PM by MD JOSSIE Anderson  RICKY (3/20/17): Right 0.58, Left 0.56. Coronary artery disease involving native heart without angina pectoris ICD-10-CM: I25.10  ICD-9-CM: 414.01  3/73/1385        Systolic CHF, chronic (HCC) ICD-10-CM: I50.22  ICD-9-CM: 428.22, 428.0  8/17/2017        Type 2 diabetes mellitus with complication ([facility] BQV-07-GH: E11.8  ICD-9-CM: 250.90  8/17/2017        Paroxysmal atrial fibrillation (HCC) ICD-10-CM: I48.0  ICD-9-CM: 427.31  3/6/2017        H/O: GI bleed ICD-10-CM: Z87.19  ICD-9-CM: V12.79  3/6/2017        Hepatitis C ICD-10-CM: B19.20  ICD-9-CM: 070.70  3/6/2017        Chronic renal insufficiency ICD-10-CM: N18.9  ICD-9-CM: 585.9  3/6/2017        Dyslipidemia ICD-10-CM: E78.5  ICD-9-CM: 272.4  3/6/2017    Overview Signed 3/6/2017  1:47 PM by MD SILVER Anderson. FLP (1/19/17): Tot 120, , HDL 19, LDL 76 (no Rx). Ischemic cardiomyopathy ICD-10-CM: I25.5  ICD-9-CM: 414.8  1/20/2017    Overview Addendum 9/18/2017  2:24 PM by MD JOSSIE Anderson Echo (1/19/17):  EF 5-10% with severe GHK,. Mildly dil LA. Mild TR. PASP 46. B. Cath (1/20/17):  LM ost70. LAD m50. D1 80. LCx d70; OM1 99, OM2 90. RCA p100. No AVG. PAP  53/27/36. C.  PCI (2/9/17): pRCA 2.5x38 Xience + 2.75x12 Xience. ostLM 4.0x12 Xience post-dil with 4.5x12 NC. D.  Echo (2/13/17):  EF 10% with severe GHK, PSM. Dil LA. Mod MR. Mild to mod TR. Left pleural effusion. E.  Echo (5/15/17): EF 25% with severe GHK and basl-mid inf AK, mildly dil LV, DD. Sev dil LA. Mod MR. Mild TR. PASP 35. F.  ICD (6/12/17, Anup): [employer].                    Mr. Tiffanie Avila is a 61 y.o. man with the above past medical history, who presents for follow up of his ischemic cardiomyopathy He is doing well from a cardiac standpoint. He has slowly gotten much better. His left ventricular systolic function has slowly improved, and he has no significant symptoms of heart failure at this time. He is currently on a Milrinone drip chronically. They are slowly weaning this off. He denies any chest pain, chest discomfort, shortness of breath, orthopnea, paroxysmal nocturnal dyspnea, lower extremity swelling, palpitations, syncope or near syncope. He has some claudication after walking several blocks. History   Smoking Status    Former Smoker    Packs/day: 2.00    Years: 45.00    Quit date: 1/20/2017   Smokeless Tobacco    Never Used       Current Outpatient Prescriptions   Medication Sig Dispense Refill    milrinone (PRIMACOR) 20 mg/100 mL (200 mcg/mL) infusion 15.16 mcg/min by IntraVENous route continuous. 100 mL 1    patiromer calcium sorbitex (VELTASSA) 8.4 gram powder Take 8.4 g by mouth daily. 4 Packet 0    spironolactone (ALDACTONE) 25 mg tablet Take 1 Tab by mouth daily. 90 Tab 1    insulin NPH/insulin regular (NOVOLIN 70/30, HUMULIN 70/30) 100 unit/mL (70-30) injection 10 Units by SubCUTAneous route two (2) times a day. Take 10 units in the morning and 8 units in the evening. 10 mL 0    sacubitril-valsartan (ENTRESTO) 49 mg/51 mg tablet Take 1 Tab by mouth two (2) times a day. 60 Tab 6    clopidogrel (PLAVIX) 75 mg tab Take 1 Tab by mouth daily. 30 Tab 4    amiodarone (CORDARONE) 200 mg tablet TAKE ONE TABLET BY MOUTH EVERY 12 HOURS 60 Tab 4    atorvastatin (LIPITOR) 10 mg tablet TAKE ONE TABLET BY MOUTH ONCE DAILY 30 Tab 4    carvedilol (COREG) 3.125 mg tablet TAKE ONE TABLET BY MOUTH TWICE DAILY WITH MEALS 60 Tab 4    bumetanide (BUMEX) 1 mg tablet Take 1 mg by mouth as needed.  sodium chloride (NORMAL SALINE FLUSH) 0.9 % 5-10 mL by IntraVENous Push route as needed.       acetaminophen (TYLENOL EXTRA STRENGTH) 500 mg tablet Take 1,000 mg by mouth every six (6) hours as needed for Pain. Indications: BACK PAIN      DULoxetine (CYMBALTA) 30 mg capsule Take 1 Cap by mouth daily. 30 Cap 5    aspirin delayed-release 81 mg tablet Take 1 Tab by mouth daily. 30 Tab 1    Insulin Syringes, Disposable, 1 mL syrg Pt to take 10 units w/ breakfast and 8 units w/ dinner 500 Syringe 1       Objective:     Visit Vitals    /62 (BP 1 Location: Right arm)    Pulse 68    Resp 16    Ht 5' 10\" (1.778 m)    Wt 177 lb 9.6 oz (80.6 kg)    SpO2 99%    BMI 25.48 kg/m2       HEENT Exam:     Normocephalic, atraumatic. EOMI. Oropharynx negative. Neck supple. No lymphadenopathy. Lung Exam:     The patient is not dyspneic. There is no cough. The lungs are clear to percussion. Breath sounds are heard equally in all lung fields. There are no wheezes, rales, rhonchi, or rubs heard on auscultation. Heart Exam:     The rhythm is regular. The PMI is in the 5th intercostal space of the MCL. Apical impulse is normal. S1 is regular. S2 is physiologic. There is no S3, S4 gallop, murmur, click, or rub. Abdomen Exam:     Bowel sounds are normoactive. Abdomen benign. Extremities Exam:     The extremities are atraumatic appearing. There is no clubbing, cyanosis, edema, ulcers, varicose veins, rash, erythemia noted in the extremities. The neurovascular status is grossly intact with normal distal sensation and pulses. Vascular Exam:     The radial, brachial, dorsalis pedis, posterior tibial, are equal and strong bilaterally The carotids are equal bilaterally without bruits. EKG: Ismael Murillo Assessment/Plan:     Mr. Jorge A Haney appears stable from a cardiac standpoint. We are going to stay the course with his current medication regimen, and he is going to follow up with me in the future. We are not going to set a follow up time at this moment because he is following up with the [address] [place] [address] every two week.   At some point he will likely follow up with me. We discussed diet and exercise, and his not smoking. Plan:  1. Continue outpatient medication regimen. 2. Follow up with me in the future. 3. Diet and exercise. 4. Call my office, call his primary care physician, or return to the hospital should any concerning symptomatology arise. Mr. Tony Henley indicated that he understood this plan and wished to proceed ahead.             Patient Care Team:  Sandra Tuttle MD as PCP - General (Family Practice)  Yury Garcia MD as Referring Provider (Cardiology)  Charlesetta Angelucci, MD as Surgeon (Cardiothoracic Surgery)  Juan Hodge MD (Cardiology)  Reji Manning MD (Nephrology)

## 2017-09-18 NOTE — MR AVS SNAPSHOT
Visit Information Date & Time Provider Department Dept. Phone Encounter #  
 9/18/2017  2:20 PM Marni Jason MD [facility] Zaina [facility] [phone] [id number] Your Appointments 9/21/2017  9:00 AM  
Follow Up with Louis Bolanos MD  
[address] [facility] ([facility]) [facility] [address]  
[phone] [address] [address]  
  
    
 12/4/2017  9:30 AM  
ESTABLISHED PATIENT with Karon Ho MD  
[address] [facility]) Appt Note: 4mo fuv for labs [id number] [place] [address] Road [id number] [phone]  
  
   
 [address] [facility] [id number] [place] [address] Road [address]  
  
    
 7/25/2018 10:00 AM  
ESTABLISHED PATIENT with Zeb Hamlin MD  
[facility] ([facility]) Appt Note: annual  
 [address] [facility] [address] [place] [address] Road  
[address] [facility] [address] [address] Upcoming Health Maintenance Date Due  
 FOOT EXAM Q1 11/23/1962 MICROALBUMIN Q1 11/23/1962 EYE EXAM RETINAL OR DILATED Q1 11/23/1962 Pneumococcal 19-64 Medium Risk (1 of 1 - PPSV23) 11/23/1971 DTaP/Tdap/Td series (1 - Tdap) 11/23/1973 ZOSTER VACCINE AGE 60> 9/23/2012 INFLUENZA AGE 9 TO ADULT 8/1/2017 LIPID PANEL Q1 1/19/2018 FOBT Q 1 YEAR AGE 50-75 2/16/2018 HEMOGLOBIN A1C Q6M 2/17/2018 Allergies as of 9/18/2017  Review Complete On: 9/18/2017 By: Ruben Kirby Severity Noted Reaction Type Reactions Heparin (Porcine)  02/07/2017    Unknown (comments) Current Immunizations  Reviewed on 2/14/2017 No immunizations on file. Not reviewed this visit You Were Diagnosed With   
  
 Codes Comments Ischemic cardiomyopathy     ICD-10-CM: I25.5 ICD-9-CM: 414.8 Vitals BP Pulse Resp Height(growth percentile) Weight(growth percentile) SpO2 112/62 (BP 1 Location: Right arm) 68 16 5' 10\" (1.778 m) 177 lb 9.6 oz (80.6 kg) 99% BMI Smoking Status 25.48 kg/m2 Former Smoker Vitals History BMI and BSA Data Body Mass Index Body Surface Area  
 25.48 kg/m 2 2 m 2 Preferred Pharmacy Pharmacy Name Phone [facility] [place] [phone] Your Updated Medication List  
  
   
This list is accurate as of: 9/18/17  2:28 PM.  Always use your most recent med list.  
  
  
  
  
 amiodarone 200 mg tablet Commonly known as:  CORDARONE  
TAKE ONE TABLET BY MOUTH EVERY 12 HOURS  
  
 aspirin delayed-release 81 mg tablet Take 1 Tab by mouth daily. atorvastatin 10 mg tablet Commonly known as:  LIPITOR  
TAKE ONE TABLET BY MOUTH ONCE DAILY  
  
 bumetanide 1 mg tablet Commonly known as:  Buddy Pack Take 1 mg by mouth as needed. carvedilol 3.125 mg tablet Commonly known as:  COREG  
TAKE ONE TABLET BY MOUTH TWICE DAILY WITH MEALS  
  
 clopidogrel 75 mg Tab Commonly known as:  PLAVIX Take 1 Tab by mouth daily. DULoxetine 30 mg capsule Commonly known as:  CYMBALTA Take 1 Cap by mouth daily. insulin NPH/insulin regular 100 unit/mL (70-30) injection Commonly known as:  NOVOLIN 70/30, HUMULIN 70/30  
10 Units by SubCUTAneous route two (2) times a day. Take 10 units in the morning and 8 units in the evening. Insulin Syringes (Disposable) 1 mL Syrg Pt to take 10 units w/ breakfast and 8 units w/ dinner  
  
 milrinone 20 mg/100 mL (200 mcg/mL) infusion Commonly known as:  PRIMACOR  
15.16 mcg/min by IntraVENous route continuous. NORMAL SALINE FLUSH 0.9 % Generic drug:  sodium chloride 5-10 mL by IntraVENous Push route as needed. patiromer calcium sorbitex 8.4 gram powder Commonly known as:  VELTASSA Take 8.4 g by mouth daily. sacubitril-valsartan 49-51 mg Tab tablet Commonly known as:  ENTRESTO Take 1 Tab by mouth two (2) times a day. spironolactone 25 mg tablet Commonly known as:  ALDACTONE Take 1 Tab by mouth daily. TYLENOL EXTRA STRENGTH 500 mg tablet Generic drug:  acetaminophen Take 1,000 mg by mouth every six (6) hours as needed for Pain. Indications: BACK PAIN To-Do List   
 09/21/2017 To Be Determined Appointment with Todd Street RN at [place] [address]  
  
 09/25/2017 To Be Determined Appointment with Todd Street RN at [place] [address]  
  
 10/02/2017 To Be Determined Appointment with Todd Street RN at [place] [address]  
  
 10/09/2017 To Be Determined Appointment with Todd Street RN at [place] [address]  
  
 10/16/2017 To Be Determined Appointment with Todd Street RN at [place] [address] Introducing [facility]! [facility] introduces Paytrail patient portal. Now you can access parts of your medical record, email your doctor's office, and request medication refills online. 1. In your internet browser, go to [email or web address] 2. Click on the First Time User? Click Here link in the Sign In box. You will see the New Member Sign Up page. 3. Enter your Paytrail Access Code exactly as it appears below. You will not need to use this code after youve completed the sign-up process. If you do not sign up before the expiration date, you must request a new code. · Paytrail Access Code: [email or web address] Expires: 10/22/2017 12:10 PM 
 
4. Enter the last four digits of your Social Security Number (xxxx) and Date of Birth (mm/dd/yyyy) as indicated and click Submit. You will be taken to the next sign-up page. 5. Create a Paytrail ID. This will be your Paytrail login ID and cannot be changed, so think of one that is secure and easy to remember. 6. Create a [employer] password. You can change your password at any time. 7. Enter your Password Reset Question and Answer. This can be used at a later time if you forget your password. 8. Enter your e-mail address. You will receive e-mail notification when new information is available in [address] 9. Click Sign Up. You can now view and download portions of your medical record. 10. Click the Download Summary menu link to download a portable copy of your medical information. If you have questions, please visit the Frequently Asked Questions section of the [employer] website. Remember, [employer] is NOT to be used for urgent needs. For medical emergencies, dial 911. Now available from your iPhone and Android! Please provide this summary of care documentation to your next provider. Your primary care clinician is listed as MICHAEL BALLARD. If you have any questions after today's visit, please call [phone].

## 2017-09-19 ENCOUNTER — TELEPHONE (OUTPATIENT)
Dept: CARDIOLOGY CLINIC | Age: 67
End: 2017-09-19

## 2017-09-21 ENCOUNTER — HOME CARE VISIT (OUTPATIENT)
Dept: SCHEDULING | Facility: HOME HEALTH | Age: 67
End: 2017-09-21

## 2017-09-21 ENCOUNTER — OFFICE VISIT (OUTPATIENT)
Dept: CARDIOLOGY CLINIC | Age: 67
End: 2017-09-21

## 2017-09-21 VITALS
HEART RATE: 71 BPM | OXYGEN SATURATION: 94 % | DIASTOLIC BLOOD PRESSURE: 65 MMHG | RESPIRATION RATE: 14 BRPM | SYSTOLIC BLOOD PRESSURE: 110 MMHG | TEMPERATURE: 98.5 F

## 2017-09-21 VITALS
HEIGHT: 70 IN | OXYGEN SATURATION: 91 % | BODY MASS INDEX: 25.71 KG/M2 | RESPIRATION RATE: 20 BRPM | HEART RATE: 75 BPM | WEIGHT: 179.6 LBS | TEMPERATURE: 97.8 F | DIASTOLIC BLOOD PRESSURE: 64 MMHG | SYSTOLIC BLOOD PRESSURE: 128 MMHG

## 2017-09-21 DIAGNOSIS — I25.5 ISCHEMIC CARDIOMYOPATHY: Primary | ICD-10-CM

## 2017-09-21 DIAGNOSIS — I50.22 SYSTOLIC CHF, CHRONIC (HCC): ICD-10-CM

## 2017-09-21 DIAGNOSIS — N18.9 CHRONIC RENAL INSUFFICIENCY, UNSPECIFIED STAGE: ICD-10-CM

## 2017-09-21 DIAGNOSIS — I73.9 PERIPHERAL VASCULAR DISEASE (HCC): ICD-10-CM

## 2017-09-21 PROCEDURE — G0299 HHS/HOSPICE OF RN EA 15 MIN: HCPCS

## 2017-09-21 NOTE — PROGRESS NOTES
[facility] Consultation Note      DOS:  9/21/2017  PRIMARY CARE PHYSICIAN: Kenney Aden MD      HPI: 61y.o. year old male with a history of chronic systolic heart failure secondary to ICM. His history is also pertinent for CAD s/p PCI of LM,  of RCA, PAF s/p ICD, Hep C, past GI bleed, and tobacco abuse. He is on home milrinone and presents to clinic today for follow up. He was last seen in our clinic 2 weeks ago and at that time was feeling well and had his milrinone infusion decreased from 0.25mcg/kg/min to 0.2mcg/kg/min. Today he reports feeling well, denies dyspnea, fatigue, orthopnea, PND, edema. Reports a healthy appetite and normal bowel movements. ROS:  As noted in HPI    Chief Complaint   Patient presents with    Follow-up     no complaints today       Impression / Plan:  ICM, EF 25%, home inotrope, NYHA class II-III  Pt denies any heart failure symptoms  Decrease milrinone to 0.125mcg/kg/min if Cr stable  Continue Coreg and Spironolactone   Continue Entresto 49/51mg - with very close monitoring of renal function  Cont Veltassa  Continue Bumex PRN  Labs today BMP  Continue daily weights, sodium and fluid restriction  Follow up in 2 weeks     CAD s/p PCI  Continue ASA, Plavix, Coreg and statin per cardiology      CKD  Last Creatinine slightly up to 1.65, will recheck today  Monitor closely while weaning milrinone and on Entresto      PAF  managed per cardiology  Continue amiodarone and Coreg      H/O lower GIB  Still has colonic polyp and AVM which will require treatment at some point      Diabetes  Insulin management by PCP      History:  Past Medical History:   Diagnosis Date    Cardiomyopathy ([address] 1/20/2017    A. Echo (1/19/17):  EF 5-10% with severe GHK,. Mildly dil LA. Mild TR. PASP 46.    Chronic renal insufficiency 3/6/2017    Diabetes mellitus ([address] 3/6/2017    Dyslipidemia 3/6/2017    A. FLP (1/19/17): Tot 120, , HDL 19, LDL 76 (no Rx).  H/O: GI bleed 3/6/2017    Hepatitis C 3/6/2017    [facility] 3/6/2017    Peripheral vascular disease ([facility] 9/18/2017    A. RICKY (3/20/17): Right 0.58, Left 0.56. Past Surgical History:   Procedure Laterality Date    COLONOSCOPY N/A 2/17/2017    COLONOSCOPY performed by Bri Shaikh. Slava Gonzalez MD at [facility] ENDOSCOPY   [facility] COLONOSCOPY      [facility] 2/2017     Social History     Social History    Marital status:      Spouse name: N/A    Number of children: N/A    Years of education: N/A     Occupational History    Not on file. Social History Main Topics    Smoking status: Former Smoker     Packs/day: 2.00     Years: 45.00     Quit date: 1/20/2017    Smokeless tobacco: Never Used    Alcohol use No    Drug use: No    Sexual activity: No     Other Topics Concern    Not on file     Social History Narrative     Family History   Problem Relation Age of Onset    No Known Problems Mother     Cancer Father     Colon Cancer Father        Current Medications:   Current Outpatient Prescriptions   Medication Sig Dispense Refill    milrinone (PRIMACOR) 20 mg/100 mL (200 mcg/mL) infusion 15.16 mcg/min by IntraVENous route continuous. 100 mL 1    patiromer calcium sorbitex (VELTASSA) 8.4 gram powder Take 8.4 g by mouth daily. 4 Packet 0    spironolactone (ALDACTONE) 25 mg tablet Take 1 Tab by mouth daily. 90 Tab 1    insulin NPH/insulin regular (NOVOLIN 70/30, HUMULIN 70/30) 100 unit/mL (70-30) injection 10 Units by SubCUTAneous route two (2) times a day. Take 10 units in the morning and 8 units in the evening. 10 mL 0    sacubitril-valsartan (ENTRESTO) 49 mg/51 mg tablet Take 1 Tab by mouth two (2) times a day. 60 Tab 6    clopidogrel (PLAVIX) 75 mg tab Take 1 Tab by mouth daily.  30 Tab 4    atorvastatin (LIPITOR) 10 mg tablet TAKE ONE TABLET BY MOUTH ONCE DAILY 30 Tab 4    carvedilol (COREG) 3.125 mg tablet TAKE ONE TABLET BY MOUTH TWICE DAILY WITH MEALS 60 Tab 4    bumetanide (BUMEX) 1 mg tablet Take 1 mg by mouth as needed.  sodium chloride (NORMAL SALINE FLUSH) 0.9 % 5-10 mL by IntraVENous Push route as needed.  acetaminophen (TYLENOL EXTRA STRENGTH) 500 mg tablet Take 1,000 mg by mouth every six (6) hours as needed for Pain. Indications: BACK PAIN      DULoxetine (CYMBALTA) 30 mg capsule Take 1 Cap by mouth daily. 30 Cap 5    aspirin delayed-release 81 mg tablet Take 1 Tab by mouth daily. 30 Tab 1    Insulin Syringes, Disposable, 1 mL syrg Pt to take 10 units w/ breakfast and 8 units w/ dinner 500 Syringe 1    amiodarone (CORDARONE) 200 mg tablet TAKE ONE TABLET BY MOUTH EVERY 12 HOURS 60 Tab 4       Allergies: Allergies   Allergen Reactions    Heparin (Porcine) Unknown (comments)       Physical Exam:   Vitals:    Visit Vitals    /64 (BP 1 Location: Right arm, BP Patient Position: Sitting)    Pulse 75    Temp 97.8 °F (36.6 °C) (Oral)    Resp 20    Ht 5' 10\" (1.778 m)    Wt 179 lb 9.6 oz (81.5 kg)    SpO2 91%    BMI 25.77 kg/m2       General:  alert, cooperative, no distress  HEENT: PERRLA and Sclera clear, anicteric  Neck:  supple, no significant adenopathy  Cardiac: regular rate and rhythm, S1, S2 normal, no murmur, click, rub or gallop. No JVD appreciated, no hepatojugular reflux. Chest: breath sounds clear and equal bilaterally  Abdomen: soft, non-tender.  Bowel sounds normal.   Extremity: extremities normal, atraumatic, no cyanosis or edema  Neuro: Grossly normal      Recent Labs:   Labs Latest Ref Rng & Units 9/5/2017   Albumin 3.6 - 4.8 g/dL 4.0   Calcium 8.6 - 10.2 mg/dL 8.8   SGOT 0 - 40 IU/L 25   Glucose 65 - 99 mg/dL 165(H)   BUN 8 - 27 mg/dL 29(H)   Creatinine 0.76 - 1.27 mg/dL 1.65(H)   Sodium 134 - 144 mmol/L 142   Potassium 3.5 - 5.2 mmol/L 4.3   Some recent data might be hidden       MONICA [address]  [address] [facility] [address] [address] [place] [address] [address]  24 hour VAD/HF Pager: [phone]

## 2017-09-21 NOTE — PATIENT INSTRUCTIONS
We will follow up on your lab results and call you when they are in. If your kidney function is stable, we will decrease the dose of your milrinone. Continue to weigh yourself every day and watch your salt and fluid intake. Call us if you gain more than 2 lbs in a day or 5 lbs in a week, or have any worsening shortness of breath or swelling. Learning About Heart Failure Zones  What are heart failure zones? Heart failure zones give you an easy way to see changes in your heart failure symptoms. They also tell you when you need to get help. Check every day to see which zone you are in. Green zone. You are doing well. This is where you want to be. · Your weight is stable. This means it is not going up or down. · You breathe easily. · You are sleeping well. You are able to lie flat without shortness of breath. · You can do your usual activities. Yellow zone. Be careful. Your symptoms are changing. Call your doctor. · You have new or increased shortness of breath. · You are dizzy or lightheaded, or you feel like you may faint. · You have sudden weight gain, such as more than 2 to 3 pounds in a day or 5 pounds in a week. (Your doctor may suggest a different range of weight gain.)  · You have increased swelling in your legs, ankles, or feet. · You are so tired or weak that you cannot do your usual activities. · You are not sleeping well. Shortness of breath wakes you up at night. You need extra pillows. Your doctor's name: ____________________________________________________________  Your doctor's contact information: _________________________________________________  Red zone. This is an emergency. Call 911. You have symptoms of sudden heart failure, such as:  · You have severe trouble breathing. · You cough up pink, foamy mucus. · You have a new irregular or fast heartbeat. You have symptoms of a heart attack.  These may include:  · Chest pain or pressure, or a strange feeling in the chest.  · Sweating. · Shortness of breath. · Nausea or vomiting. · Pain, pressure, or a strange feeling in the back, neck, jaw, or upper belly or in one or both shoulders or arms. · Lightheadedness or sudden weakness. · A fast or irregular heartbeat. If you have symptoms of a heart attack: After you call 911, the  may tell you to chew 1 adult-strength or 2 to 4 low-dose aspirin. Wait for an ambulance. Do not try to drive yourself. Follow-up care is a key part of your treatment and safety. Be sure to make and go to all appointments, and call your doctor if you are having problems. It's also a good idea to know your test results and keep a list of the medicines you take. Where can you learn more? Go to http://erik-dianna.info/. Enter T174 in the search box to learn more about \"Learning About Heart Failure Zones. \"  Current as of: February 23, 2017  Content Version: 11.3  © 8112-0599 [employer]. Care instructions adapted under license by [employer] (which disclaims liability or warranty for this information). If you have questions about a medical condition or this instruction, always ask your healthcare professional. [place] [address] any warranty or liability for your use of this information. Peripheral Arterial Disease of the Leg: Care Instructions  Your Care Instructions  Peripheral arterial disease (PAD) occurs when the blood vessels (arteries) that supply blood to the legs, belly, pelvis, arms, or neck get too narrow. This reduces blood flow to that area. The legs are affected most often. Fatty buildup (plaque) in the arteries usually is the cause of PAD. This buildup is also called \"hardening\" of the arteries. Your risk of PAD increases if you smoke or have high cholesterol, high blood pressure, diabetes, or a family history of PAD. One of the main symptoms of PAD is intermittent claudication.  This is a tight, aching, or squeezing pain in the calf, foot, thigh, or buttock that occurs during exercise. The pain usually gets worse during exercise and goes away when you rest. But as PAD gets worse, you may feel pain even at rest.  Medicines and lifestyle changes may help your symptoms and lower your risk of heart attack and stroke. In some cases, surgery or other treatment is needed. It is important that you follow up with your doctor. Follow-up care is a key part of your treatment and safety. Be sure to make and go to all appointments, and call your doctor if you are having problems. It's also a good idea to know your test results and keep a list of the medicines you take. How can you care for yourself at home? · Do not smoke. Smoking can make PAD worse. If you need help quitting, talk to your doctor about stop-smoking programs and medicines. These can increase your chances of quitting for good. · Take your medicines exactly as prescribed. Call your doctor if you think you are having a problem with your medicine. · If you take a blood thinner, such as aspirin, be sure to get instructions about how to take your medicine safely. Blood thinners can cause serious bleeding problems. · Ask your doctor if a cardiac rehab program is right for you. Cardiac rehab can help you make lifestyle changes. In cardiac rehab, a team of health professionals provides education and support to help you make new, healthy habits. · Eat heart-healthy foods such as fruits, vegetables, whole grains, fish, lean meats, and low-fat or nonfat dairy foods. Limit sodium, sugar, and alcohol. · If your doctor recommends it, get more exercise. Walking is a good choice. Bit by bit, increase the amount you walk every day. Try for at least 30 minutes on most days of the week. · Stay at a healthy weight. Lose weight if you need to. · Take good care of your feet. ¨ Treat cuts and scrapes on your legs right away.  Poor blood flow prevents (or slows) quick healing of even small cuts or scrapes. This is even more important if you have diabetes. ¨ Avoid shoes that are too tight or that rub your feet. Shoes should be comfortable and fit well. ¨ Avoid socks or stockings that are tight enough to leave elastic-band marks on your legs. Tight socks can make circulation problems worse. ¨ Keep your feet clean and moisturized to prevent drying and cracking. Place cotton or lamb's wool between your toes to prevent rubbing and to absorb moisture. ¨ If you have a sore on your leg or foot, keep it dry and cover it with a nonstick bandage until you see your doctor. When should you call for help? Call 911 anytime you think you may need emergency care. For example, call if:  · You have symptoms of a heart attack. These may include:  ¨ Chest pain or pressure, or a strange feeling in the chest.  ¨ Sweating. ¨ Shortness of breath. ¨ Nausea or vomiting. ¨ Pain, pressure, or a strange feeling in the back, neck, jaw, or upper belly or in one or both shoulders or arms. ¨ Lightheadedness or sudden weakness. ¨ A fast or irregular heartbeat. After you call 911, the  may tell you to chew 1 adult-strength or 2 to 4 low-dose aspirin. Wait for an ambulance. Do not try to drive yourself. · You have sudden, severe leg pain, and your leg is cool and pale. · You have symptoms of a stroke. These may include:  ¨ Sudden numbness, tingling, weakness, or loss of movement in your face, arm, or leg, especially on only one side of your body. ¨ Sudden vision changes. ¨ Sudden trouble speaking. ¨ Sudden confusion or trouble understanding simple statements. ¨ Sudden problems with walking or balance. ¨ A sudden, severe headache that is different from past headaches. Call your doctor now or seek immediate medical care if:  · You have leg pain that does not go away even if you rest.  · Your leg pain changes or gets worse.  For example, if you have more pain with normal activity or the same pain with decreased activity, you should call. · You have an open sore on your leg or foot that is infected. Signs of infection include:  ¨ Increased pain, swelling, warmth, or redness. ¨ Red streaks leading from the sore. ¨ Pus draining from the sore. ¨ A fever. Watch closely for changes in your health, and be sure to contact your doctor if you have any problems. Where can you learn more? Go to http://erik-dianna.info/. Enter 056 [id number] in the search box to learn more about \"Peripheral Arterial Disease of the Leg: Care Instructions. \"  Current as of: July 19, 2016  Content Version: 11.3  © 8701-5051 [employer]. Care instructions adapted under license by [employer] (which disclaims liability or warranty for this information). If you have questions about a medical condition or this instruction, always ask your healthcare professional. [address] any warranty or liability for your use of this information.

## 2017-09-22 ENCOUNTER — TELEPHONE (OUTPATIENT)
Dept: CARDIOLOGY CLINIC | Age: 67
End: 2017-09-22

## 2017-09-22 LAB
BUN SERPL-MCNC: 38 MG/DL (ref 8–27)
BUN/CREAT SERPL: 21 (ref 10–24)
CALCIUM SERPL-MCNC: 8.8 MG/DL (ref 8.6–10.2)
CHLORIDE SERPL-SCNC: 105 MMOL/L (ref 96–106)
CO2 SERPL-SCNC: 21 MMOL/L (ref 18–29)
CREAT SERPL-MCNC: 1.82 MG/DL (ref 0.76–1.27)
GLUCOSE SERPL-MCNC: 180 MG/DL (ref 65–99)
POTASSIUM SERPL-SCNC: 4.4 MMOL/L (ref 3.5–5.2)
SODIUM SERPL-SCNC: 141 MMOL/L (ref 134–144)

## 2017-09-22 NOTE — COMMUNICATION BODY
[facility] Consultation Note      DOS:  9/21/2017  PRIMARY CARE PHYSICIAN: Karon Ho MD      HPI: 61y.o. year old male with a history of chronic systolic heart failure secondary to ICM. His history is also pertinent for CAD s/p PCI of LM,  of RCA, PAF s/p ICD, Hep C, past GI bleed, and tobacco abuse. He is on home milrinone and presents to clinic today for follow up. He was last seen in our clinic 2 weeks ago and at that time was feeling well and had his milrinone infusion decreased from 0.25mcg/kg/min to 0.2mcg/kg/min. Today he reports feeling well, denies dyspnea, fatigue, orthopnea, PND, edema. Reports a healthy appetite and normal bowel movements. ROS:  As noted in HPI    Chief Complaint   Patient presents with    Follow-up     no complaints today       Impression / Plan:  ICM, EF 25%, home inotrope, NYHA class II-III  Pt denies any heart failure symptoms  Decrease milrinone to 0.125mcg/kg/min if Cr stable  Continue Coreg and Spironolactone   Continue Entresto 49/51mg - with very close monitoring of renal function  Cont Veltassa  Continue Bumex PRN  Labs today BMP  Continue daily weights, sodium and fluid restriction  Follow up in 2 weeks     CAD s/p PCI  Continue ASA, Plavix, Coreg and statin per cardiology      CKD  Last Creatinine slightly up to 1.65, will recheck today  Monitor closely while weaning milrinone and on Entresto      PAF  managed per cardiology  Continue amiodarone and Coreg      H/O lower GIB  Still has colonic polyp and AVM which will require treatment at some point      Diabetes  Insulin management by PCP      History:  Past Medical History:   Diagnosis Date    Cardiomyopathy ([address] 1/20/2017    A. Echo (1/19/17):  EF 5-10% with severe GHK,. Mildly dil LA. Mild TR. PASP 46.    Chronic renal insufficiency 3/6/2017    Diabetes mellitus ([facility] 3/6/2017    Dyslipidemia 3/6/2017    A. FLP (1/19/17): Tot 120, , HDL 19, LDL 76 (no Rx).  H/O: GI bleed 3/6/2017    Hepatitis C 3/6/2017    [facility]ox[facility]mal [facility] ([facility] 3/6/2017    Peripheral vascular disease ([facility] 9/18/2017    A. RICKY (3/20/17): Right 0.58, Left 0.56. Past Surgical History:   Procedure Laterality Date    COLONOSCOPY N/A 2/17/2017    COLONOSCOPY performed by Chasity Carvalho. Mikal Farfan MD at [facility] ENDOSCOPY   [facility] COLONOSCOPY      [facility] 2/2017     Social History     Social History    Marital status:      Spouse name: N/A    Number of children: N/A    Years of education: N/A     Occupational History    Not on file. Social History Main Topics    Smoking status: Former Smoker     Packs/day: 2.00     Years: 45.00     Quit date: 1/20/2017    Smokeless tobacco: Never Used    Alcohol use No    Drug use: No    Sexual activity: No     Other Topics Concern    Not on file     Social History Narrative     Family History   Problem Relation Age of Onset    No Known Problems Mother     Cancer Father     Colon Cancer Father        Current Medications:   Current Outpatient Prescriptions   Medication Sig Dispense Refill    milrinone (PRIMACOR) 20 mg/100 mL (200 mcg/mL) infusion 15.16 mcg/min by IntraVENous route continuous. 100 mL 1    patiromer calcium sorbitex (VELTASSA) 8.4 gram powder Take 8.4 g by mouth daily. 4 Packet 0    spironolactone (ALDACTONE) 25 mg tablet Take 1 Tab by mouth daily. 90 Tab 1    insulin NPH/insulin regular (NOVOLIN 70/30, HUMULIN 70/30) 100 unit/mL (70-30) injection 10 Units by SubCUTAneous route two (2) times a day. Take 10 units in the morning and 8 units in the evening. 10 mL 0    sacubitril-valsartan (ENTRESTO) 49 mg/51 mg tablet Take 1 Tab by mouth two (2) times a day. 60 Tab 6    clopidogrel (PLAVIX) 75 mg tab Take 1 Tab by mouth daily.  30 Tab 4    atorvastatin (LIPITOR) 10 mg tablet TAKE ONE TABLET BY MOUTH ONCE DAILY 30 Tab 4    carvedilol (COREG) 3.125 mg tablet TAKE ONE TABLET BY MOUTH TWICE DAILY WITH MEALS 60 Tab 4    bumetanide (BUMEX) 1 mg tablet Take 1 mg by mouth as needed.  sodium chloride (NORMAL SALINE FLUSH) 0.9 % 5-10 mL by IntraVENous Push route as needed.  acetaminophen (TYLENOL EXTRA STRENGTH) 500 mg tablet Take 1,000 mg by mouth every six (6) hours as needed for Pain. Indications: BACK PAIN      DULoxetine (CYMBALTA) 30 mg capsule Take 1 Cap by mouth daily. 30 Cap 5    aspirin delayed-release 81 mg tablet Take 1 Tab by mouth daily. 30 Tab 1    Insulin Syringes, Disposable, 1 mL syrg Pt to take 10 units w/ breakfast and 8 units w/ dinner 500 Syringe 1    amiodarone (CORDARONE) 200 mg tablet TAKE ONE TABLET BY MOUTH EVERY 12 HOURS 60 Tab 4       Allergies: Allergies   Allergen Reactions    Heparin (Porcine) Unknown (comments)       Physical Exam:   Vitals:    Visit Vitals    /64 (BP 1 Location: Right arm, BP Patient Position: Sitting)    Pulse 75    Temp 97.8 °F (36.6 °C) (Oral)    Resp 20    Ht 5' 10\" (1.778 m)    Wt 179 lb 9.6 oz (81.5 kg)    SpO2 91%    BMI 25.77 kg/m2       General:  alert, cooperative, no distress  HEENT: PERRLA and Sclera clear, anicteric  Neck:  supple, no significant adenopathy  Cardiac: regular rate and rhythm, S1, S2 normal, no murmur, click, rub or gallop. No JVD appreciated, no hepatojugular reflux. Chest: breath sounds clear and equal bilaterally  Abdomen: soft, non-tender.  Bowel sounds normal.   Extremity: extremities normal, atraumatic, no cyanosis or edema  Neuro: Grossly normal      Recent Labs:   Labs Latest Ref Rng & Units 9/5/2017   Albumin 3.6 - 4.8 g/dL 4.0   Calcium 8.6 - 10.2 mg/dL 8.8   SGOT 0 - 40 IU/L 25   Glucose 65 - 99 mg/dL 165(H)   BUN 8 - 27 mg/dL 29(H)   Creatinine 0.76 - 1.27 mg/dL 1.65(H)   Sodium 134 - 144 mmol/L 142   Potassium 3.5 - 5.2 mmol/L 4.3   Some recent data might be hidden       MONICA Del Toro [address] [place] [address] [id number] [place] [address] [address]  24 hour VAD/HF Pager: [phone]

## 2017-09-22 NOTE — TELEPHONE ENCOUNTER
Reviewed labs from 9/21/17. Cr up slightly again to 1.82. Will hold off on any changes to milrinone for now and reassess in 2 weeks at his next follow up visit. He clinically looked great and denies any heart failure symptoms. Will continue Entresto at current dose for now with diuretics only PRN (hasn't needed them). Called and notified pt, verbalized understanding.

## 2017-09-27 ENCOUNTER — HOME CARE VISIT (OUTPATIENT)
Dept: SCHEDULING | Facility: HOME HEALTH | Age: 67
End: 2017-09-27

## 2017-09-27 VITALS
RESPIRATION RATE: 16 BRPM | HEART RATE: 78 BPM | DIASTOLIC BLOOD PRESSURE: 76 MMHG | OXYGEN SATURATION: 98 % | SYSTOLIC BLOOD PRESSURE: 118 MMHG | TEMPERATURE: 98.7 F

## 2017-09-27 PROCEDURE — G0299 HHS/HOSPICE OF RN EA 15 MIN: HCPCS

## 2017-09-28 ENCOUNTER — TELEPHONE (OUTPATIENT)
Dept: CARDIOLOGY CLINIC | Age: 67
End: 2017-09-28

## 2017-09-28 NOTE — TELEPHONE ENCOUNTER
----- Message from Ludmila Liu NP sent at 9/28/2017  2:30 PM EDT -----  Please let him know that his Cr has gone up slightly but I have reviewed his labs w/Dr Rogers Nowak and we will continue to monitor on decreased milrinone dose. Increase PO fluid intake. Thanks!    ----- Message -----     From: Jose Aranda MD     Sent: 9/26/2017   6:42 PM       To: Ludmila Liu NP     Can he tolerate a reduction in his bumex dose?    ----- Message -----     From: Pili Lopez Lab Results In     Sent: 9/22/2017   6:43 AM       To: Jose Aranda MD          Called patient and notified him of above. He states understanding and has no further questions. Has follow up appointment next week, 10/5/17.

## 2017-10-04 ENCOUNTER — TELEPHONE (OUTPATIENT)
Dept: CARDIOLOGY CLINIC | Age: 67
End: 2017-10-04

## 2017-10-04 NOTE — TELEPHONE ENCOUNTER
Telephone Call RE:  Appointment reminder     Outcome:     [] Patient confirmed appointment   [] Patient rescheduled appointment for    [] Unable to reach   [x] Left message              [] Other:       Manish Narayan

## 2017-10-05 ENCOUNTER — OFFICE VISIT (OUTPATIENT)
Dept: CARDIOLOGY CLINIC | Age: 67
End: 2017-10-05

## 2017-10-05 ENCOUNTER — HOME CARE VISIT (OUTPATIENT)
Dept: SCHEDULING | Facility: HOME HEALTH | Age: 67
End: 2017-10-05

## 2017-10-05 VITALS
HEIGHT: 70 IN | SYSTOLIC BLOOD PRESSURE: 134 MMHG | TEMPERATURE: 97.9 F | OXYGEN SATURATION: 96 % | BODY MASS INDEX: 25.25 KG/M2 | DIASTOLIC BLOOD PRESSURE: 70 MMHG | WEIGHT: 176.4 LBS | RESPIRATION RATE: 20 BRPM | HEART RATE: 67 BPM

## 2017-10-05 DIAGNOSIS — I25.5 ISCHEMIC CARDIOMYOPATHY: Primary | ICD-10-CM

## 2017-10-05 DIAGNOSIS — E13.40 NEUROPATHY DUE TO SECONDARY DIABETES (HCC): ICD-10-CM

## 2017-10-05 DIAGNOSIS — R76.8 HCV ANTIBODY POSITIVE: ICD-10-CM

## 2017-10-05 DIAGNOSIS — I73.9 PERIPHERAL VASCULAR DISEASE (HCC): ICD-10-CM

## 2017-10-05 DIAGNOSIS — E87.5 HYPERKALEMIA: ICD-10-CM

## 2017-10-05 DIAGNOSIS — Z95.810 ICD (IMPLANTABLE CARDIOVERTER-DEFIBRILLATOR) IN PLACE: ICD-10-CM

## 2017-10-05 DIAGNOSIS — N18.9 CHRONIC RENAL INSUFFICIENCY, UNSPECIFIED STAGE: ICD-10-CM

## 2017-10-05 DIAGNOSIS — I48.0 PAROXYSMAL ATRIAL FIBRILLATION (HCC): ICD-10-CM

## 2017-10-05 PROCEDURE — G0300 HHS/HOSPICE OF LPN EA 15 MIN: HCPCS

## 2017-10-05 NOTE — PATIENT INSTRUCTIONS
We will have Tiara Smith draw your labs today. If your kidney function is acceptable, we can discontinue the milrinone. Continue to drink plenty of water. Continue to weigh yourself daily, and notify our office for weight gain > 2 lbs in 1 day. Follow up in 2 weeks.

## 2017-10-05 NOTE — PROGRESS NOTES
[facility] Consultation Note      DOS:  10/5/2017  PRIMARY CARE PHYSICIAN: Carolyn Lind MD    HPI: 61y.o. year old male with a history of chronic systolic heart failure secondary to ICM. His history is also pertinent for CAD s/p PCI of LM,  of RCA, PAF s/p ICD, Hep C, past GI bleed, and tobacco abuse. He is on home milrinone and presents to clinic today for follow up. He was last seen in our clinic 2 weeks ago and at that time was feeling well and had his milrinone infusion decreased from 0.2 mcg/kg/min to 0.125 mcg/kg/min. His Cr has been slowly rising but remains < 2.0. Today he reports feeling well, denies dyspnea, fatigue, orthopnea, PND, edema. He has been working outside and feels \"great\". BG are well controlled and he is feeling great. ROS:  As noted in HPI    Chief Complaint   Patient presents with    Follow-up     no complaints       Impression / Plan:  ICM, EF 25%, home inotrope, NYHA class II-III  Pt denies any heart failure symptoms  Consider d/c'ding milrinone if labs are stable  Continue Coreg and Spironolactone   Continue Entresto 49/51mg - with very close monitoring of renal function  Cont V[place]  Continue Bumex PRN - no recent use  Labs today BMP  Continue daily weights, sodium and fluid restriction  Follow up in 2 weeks     CAD s/p PCI  Continue ASA, Plavix, Coreg and statin per cardiology      CKD  Last Creatinine slightly up to 1.8, will recheck today  Monitor closely while weaning milrinone and on Entresto      PAF  Managed per cardiology  Continue amiodarone and Coreg      H/O lower GIB  Still has colonic polyp and AVM which will require treatment at some point      Diabetes  Insulin management by PCP      History:  Past Medical History:   Diagnosis Date    [facility]    A. Echo (1/19/17):  EF 5-10% with severe GHK,. Mildly dil LA. Mild TR.   PASP 46.    Chronic renal insufficiency 3/6/2017    Diabetes mellitus ([facility] 3/6/2017    Dyslipidemia 3/6/2017    A. FLP (1/19/17): Tot 120, , HDL 19, LDL 76 (no Rx).  H/O: GI bleed 3/6/2017    Hepatitis C 3/6/2017    Paroxysmal atrial fibrillation ([facility] 3/6/2017    Peripheral vascular disease ([facility] 9/18/2017    A. RICKY (3/20/17): Right 0.58, Left 0.56. Past Surgical History:   Procedure Laterality Date    COLONOSCOPY N/A 2/17/2017    COLONOSCOPY performed by Merlinda Och. Bartholomew Hasten, MD at [facility] ENDOSCOPY   Benuel Hard COLONOSCOPY      [facility] 2/2017     Social History     Social History    Marital status:      Spouse name: N/A    Number of children: N/A    Years of education: N/A     Occupational History    Not on file. Social History Main Topics    Smoking status: Former Smoker     Packs/day: 2.00     Years: 45.00     Quit date: 1/20/2017    Smokeless tobacco: Never Used    Alcohol use No    Drug use: No    Sexual activity: No     Other Topics Concern    Not on file     Social History Narrative     Family History   Problem Relation Age of Onset    No Known Problems Mother     Cancer Father     Colon Cancer Father        Current Medications:   Current Outpatient Prescriptions   Medication Sig Dispense Refill    milrinone (PRIMACOR) 20 mg/100 mL (200 mcg/mL) infusion 15.16 mcg/min by IntraVENous route continuous. 100 mL 1    patiromer calcium sorbitex (VELTASSA) 8.4 gram powder Take 8.4 g by mouth daily. 4 Packet 0    spironolactone (ALDACTONE) 25 mg tablet Take 1 Tab by mouth daily. 90 Tab 1    insulin NPH/insulin regular (NOVOLIN 70/30, HUMULIN 70/30) 100 unit/mL (70-30) injection 10 Units by SubCUTAneous route two (2) times a day. Take 10 units in the morning and 8 units in the evening. 10 mL 0    sacubitril-valsartan (ENTRESTO) 49 mg/51 mg tablet Take 1 Tab by mouth two (2) times a day. 60 Tab 6    clopidogrel (PLAVIX) 75 mg tab Take 1 Tab by mouth daily.  30 Tab 4    amiodarone (CORDARONE) 200 mg tablet TAKE ONE TABLET BY MOUTH EVERY 12 HOURS 60 Tab 4    atorvastatin (LIPITOR) 10 mg tablet TAKE ONE TABLET BY MOUTH ONCE DAILY 30 Tab 4    carvedilol (COREG) 3.125 mg tablet TAKE ONE TABLET BY MOUTH TWICE DAILY WITH MEALS 60 Tab 4    bumetanide (BUMEX) 1 mg tablet Take 1 mg by mouth as needed.  sodium chloride (NORMAL SALINE FLUSH) 0.9 % 5-10 mL by IntraVENous Push route as needed.  acetaminophen (TYLENOL EXTRA STRENGTH) 500 mg tablet Take 1,000 mg by mouth every six (6) hours as needed for Pain. Indications: BACK PAIN      DULoxetine (CYMBALTA) 30 mg capsule Take 1 Cap by mouth daily. 30 Cap 5    Insulin Syringes, Disposable, 1 mL syrg Pt to take 10 units w/ breakfast and 8 units w/ dinner 500 Syringe 1    aspirin delayed-release 81 mg tablet Take 1 Tab by mouth daily. 30 Tab 1       Allergies: Allergies   Allergen Reactions    Heparin (Porcine) Unknown (comments)       Physical Exam:   Vitals:    Visit Vitals    /70    Pulse 67    Temp 97.9 °F (36.6 °C) (Oral)    Resp 20    Ht 5' 10\" (1.778 m)    Wt 176 lb 6.4 oz (80 kg)    SpO2 96%    BMI 25.31 kg/m2       General:  alert, cooperative, no distress  HEENT: PERRLA and Sclera clear, anicteric  Neck:  supple, no significant adenopathy  Cardiac: regular rate and rhythm, S1, S2 normal, no murmur, click, rub or gallop. No JVD appreciated, no hepatojugular reflux. Chest: breath sounds clear and equal bilaterally  Abdomen: soft, non-tender.  Bowel sounds normal.   Extremity: extremities normal, atraumatic, no cyanosis or edema  Neuro: Grossly normal      Recent Labs:   Labs Latest Ref Rng & Units 9/21/2017 9/5/2017   Albumin 3.6 - 4.8 g/dL - 4.0   Calcium 8.6 - 10.2 mg/dL 8.8 8.8   SGOT 0 - 40 IU/L - 25   Glucose 65 - 99 mg/dL 180(H) 165(H)   BUN 8 - 27 mg/dL 38(H) 29(H)   Creatinine 0.76 - 1.27 mg/dL 1.82(H) 1.65(H)   Sodium 134 - 144 mmol/L 141 142   Potassium 3.5 - 5.2 mmol/L 4.4 4.3   Some recent data might be hidden     Follow-up Disposition:  Return in about 2 weeks (around 10/19/2017).       MONICA Bansal [address] [place] [address]   24 hour VAD/HF Pager: [phone]

## 2017-10-05 NOTE — MR AVS SNAPSHOT
Visit Information Date & Time Provider Department Dept. Phone Encounter #  
 10/5/2017 10:00 AM Yanely Ellis MD [address] [id number] Your Appointments 12/4/2017  9:30 AM  
ESTABLISHED PATIENT with Demetrice Pham MD  
[address] [facility]) Appt Note: 4mo fuv for labs [address] [address] [address] [address]  
[phone]  
  
   
 [address] [address] [address] [address]  
  
    
 7/25/2018 10:00 AM  
ESTABLISHED PATIENT with Kevin Clinton MD  
[facility] ([facility]) Appt Note: annual  
 [address] [facility] [place] [address] [facility]  
[address] [facility] [place] [address] [address] Upcoming Health Maintenance Date Due  
 FOOT EXAM Q1 11/23/1962 MICROALBUMIN Q1 11/23/1962 EYE EXAM RETINAL OR DILATED Q1 11/23/1962 Pneumococcal 19-64 Medium Risk (1 of 1 - PPSV23) 11/23/1971 DTaP/Tdap/Td series (1 - Tdap) 11/23/1973 ZOSTER VACCINE AGE 60> 9/23/2012 INFLUENZA AGE 9 TO ADULT 8/1/2017 LIPID PANEL Q1 1/19/2018 FOBT Q 1 YEAR AGE 50-75 2/16/2018 HEMOGLOBIN A1C Q6M 2/17/2018 Allergies as of 10/5/2017  Review Complete On: 10/5/2017 By: Dea Romano Severity Noted Reaction Type Reactions Heparin (Porcine)  02/07/2017    Unknown (comments) Current Immunizations  Reviewed on 2/14/2017 No immunizations on file. Not reviewed this visit Vitals BP Pulse Temp Resp Height(growth percentile) Weight(growth percentile) 134/70 67 97.9 °F (36.6 °C) (Oral) 20 5' 10\" (1.778 m) 176 lb 6.4 oz (80 kg) SpO2 BMI Smoking Status 96% 25.31 kg/m2 Former Smoker Vitals History BMI and BSA Data Body Mass Index Body Surface Area  
 25.31 kg/m 2 1.99 m 2 Preferred Pharmacy Pharmacy Name Phone [address] [phone] Your Updated Medication List  
  
   
This list is accurate as of: 10/5/17 10:30 AM.  Always use your most recent med list.  
  
  
  
  
 amiodarone 200 mg tablet Commonly known as:  CORDARONE  
TAKE ONE TABLET BY MOUTH EVERY 12 HOURS  
  
 aspirin delayed-release 81 mg tablet Take 1 Tab by mouth daily. atorvastatin 10 mg tablet Commonly known as:  LIPITOR  
TAKE ONE TABLET BY MOUTH ONCE DAILY  
  
 bumetanide 1 mg tablet Commonly known as:  Rebollar Alanna Take 1 mg by mouth as needed. carvedilol 3.125 mg tablet Commonly known as:  COREG  
TAKE ONE TABLET BY MOUTH TWICE DAILY WITH MEALS  
  
 clopidogrel 75 mg Tab Commonly known as:  PLAVIX Take 1 Tab by mouth daily. DULoxetine 30 mg capsule Commonly known as:  CYMBALTA Take 1 Cap by mouth daily. insulin NPH/insulin regular 100 unit/mL (70-30) injection Commonly known as:  NOVOLIN 70/30, HUMULIN 70/30  
10 Units by SubCUTAneous route two (2) times a day. Take 10 units in the morning and 8 units in the evening. Insulin Syringes (Disposable) 1 mL Syrg Pt to take 10 units w/ breakfast and 8 units w/ dinner  
  
 milrinone 20 mg/100 mL (200 mcg/mL) infusion Commonly known as:  PRIMACOR  
15.16 mcg/min by IntraVENous route continuous. NORMAL SALINE FLUSH 0.9 % Generic drug:  sodium chloride 5-10 mL by IntraVENous Push route as needed. patiromer calcium sorbitex 8.4 gram powder Commonly known as:  VELTASSA Take 8.4 g by mouth daily. sacubitril-valsartan 49-51 mg Tab tablet Commonly known as:  ENTRESTO Take 1 Tab by mouth two (2) times a day. spironolactone 25 mg tablet Commonly known as:  ALDACTONE Take 1 Tab by mouth daily. TYLENOL EXTRA STRENGTH 500 mg tablet Generic drug:  acetaminophen Take 1,000 mg by mouth every six (6) hours as needed for Pain.  Indications: BACK PAIN  
  
  
  
  
 To-Do List   
 10/09/2017 To Be Determined Appointment with Wade Bonilla RN at [place] [address]  
  
 10/16/2017 To Be Determined Appointment with Wade Bonilla RN at [place] [address] Patient Instructions We will have Vinicius Hue draw your labs today. If your kidney function is acceptable, we can discontinue the milrinone. Continue to drink plenty of water. Continue to weigh yourself daily, and notify our office for weight gain > 2 lbs in 1 day. Follow up in 2 weeks. Introducing [facility]! [facility] introduces [employer] patient portal. Now you can access parts of your medical record, email your doctor's office, and request medication refills online. 1. In your internet browser, go to [email or web address] 2. Click on the First Time User? Click Here link in the Sign In box. You will see the New Member Sign Up page. 3. Enter your [employer] Access Code exactly as it appears below. You will not need to use this code after youve completed the sign-up process. If you do not sign up before the expiration date, you must request a new code. · [employer] Access Code: [email or web address] Expires: 10/22/2017 12:10 PM 
 
4. Enter the last four digits of your Social Security Number (xxxx) and Date of Birth (mm/dd/yyyy) as indicated and click Submit. You will be taken to the next sign-up page. 5. Create a ithinksportt ID. This will be your [employer] login ID and cannot be changed, so think of one that is secure and easy to remember. 6. Create a [employer] password. You can change your password at any time. 7. Enter your Password Reset Question and Answer. This can be used at a later time if you forget your password. 8. Enter your e-mail address. You will receive e-mail notification when new information is available in [address]. 9. Click Sign Up.  You can now view and download portions of your medical record. 10. Click the Download Summary menu link to download a portable copy of your medical information. If you have questions, please visit the Frequently Asked Questions section of the [employer] website. Remember, [employer] is NOT to be used for urgent needs. For medical emergencies, dial 911. Now available from your iPhone and Android! Please provide this summary of care documentation to your next provider. Your primary care clinician is listed as MICHAEL BALLARD. If you have any questions after today's visit, please call [phone].

## 2017-10-06 ENCOUNTER — TELEPHONE (OUTPATIENT)
Dept: CARDIOLOGY CLINIC | Age: 67
End: 2017-10-06

## 2017-10-06 NOTE — TELEPHONE ENCOUNTER
Patient calling looking for lab results. Please advise, thank you    I called [facility] and they have no record of lab results from labs drawn on 10/5/17 although [place] nurse has documented lab draw. I called [facility] and they will investigate for lab results. Received lab results:  Per Frances August may d/c milrinone and PICC line-I e-mailed [facility] to make a visit tomorrow to do this and have notified patient. He has a follow up visit in [facility] on 10/17.

## 2017-10-07 VITALS
RESPIRATION RATE: 20 BRPM | OXYGEN SATURATION: 98 % | BODY MASS INDEX: 24.39 KG/M2 | DIASTOLIC BLOOD PRESSURE: 76 MMHG | HEART RATE: 65 BPM | TEMPERATURE: 97.9 F | WEIGHT: 170 LBS | SYSTOLIC BLOOD PRESSURE: 120 MMHG

## 2017-10-09 ENCOUNTER — TELEPHONE (OUTPATIENT)
Dept: CARDIOLOGY CLINIC | Age: 67
End: 2017-10-09

## 2017-10-09 RX ORDER — INSULIN HUMAN 100 [IU]/ML
INJECTION, SUSPENSION SUBCUTANEOUS
Qty: 10 VIAL | Refills: 0 | Status: SHIPPED | OUTPATIENT
Start: 2017-10-09 | End: 2017-10-09 | Stop reason: CLARIF

## 2017-10-09 NOTE — TELEPHONE ENCOUNTER
Patient calling in requesting refill for insulin. I advised that Cherelle Mistry has already refilled it but is requesting that he get future refills from PCP. He states that he has an appointment with PCP in December.

## 2017-10-09 NOTE — TELEPHONE ENCOUNTER
Returned call to [address], they needed order ammended, patient is on Novolin, not Humulin. Order was ammended verbally, then in computer.

## 2017-10-09 NOTE — TELEPHONE ENCOUNTER
Received voice message regarding needing clarification on recent order for insulin.     Please call [place] at # [phone]

## 2017-10-10 ENCOUNTER — TELEPHONE (OUTPATIENT)
Dept: CARDIOLOGY CLINIC | Age: 67
End: 2017-10-10

## 2017-10-10 NOTE — TELEPHONE ENCOUNTER
Patient called because home health nurse has not shown up to stop his milrinone and pull his PICC line. [facility]. I called patient and they had already called him and have scheduled him for tomorrow morning.

## 2017-10-10 NOTE — TELEPHONE ENCOUNTER
Returned call to [place] at [facility], informed her that I have notified [facility] to pull PICC line and stop Milrinone today.

## 2017-10-10 NOTE — TELEPHONE ENCOUNTER
Received voice message from Kumar Argueta from [facility] wanting to confirm if patient still needs to continue or stop   Milrinone.     Please return call at # [phone]

## 2017-10-11 ENCOUNTER — TELEPHONE (OUTPATIENT)
Dept: CARDIOLOGY CLINIC | Age: 67
End: 2017-10-11

## 2017-10-11 ENCOUNTER — HOME CARE VISIT (OUTPATIENT)
Dept: SCHEDULING | Facility: HOME HEALTH | Age: 67
End: 2017-10-11

## 2017-10-11 VITALS
HEART RATE: 64 BPM | DIASTOLIC BLOOD PRESSURE: 82 MMHG | RESPIRATION RATE: 15 BRPM | SYSTOLIC BLOOD PRESSURE: 134 MMHG | TEMPERATURE: 98.6 F | OXYGEN SATURATION: 98 %

## 2017-10-11 PROCEDURE — G0299 HHS/HOSPICE OF RN EA 15 MIN: HCPCS

## 2017-10-11 NOTE — TELEPHONE ENCOUNTER
Call from Sheldon with [facility] [phone]. She wanted to be sure it was ok to discharge patient form Home Health. Per Cherelle Mistry, this is fine.

## 2017-10-18 ENCOUNTER — TELEPHONE (OUTPATIENT)
Dept: CARDIOLOGY CLINIC | Age: 67
End: 2017-10-18

## 2017-10-18 NOTE — TELEPHONE ENCOUNTER
Telephone Call RE:  Appointment reminder     Outcome:     [] Patient confirmed appointment   [] Patient rescheduled appointment for    [] Unable to reach   [x] Left message              [] Other:       Vincent Fernandez

## 2017-10-19 ENCOUNTER — DOCUMENTATION ONLY (OUTPATIENT)
Dept: CARDIOLOGY CLINIC | Age: 67
End: 2017-10-19

## 2017-10-19 ENCOUNTER — OFFICE VISIT (OUTPATIENT)
Dept: CARDIOLOGY CLINIC | Age: 67
End: 2017-10-19

## 2017-10-19 VITALS
HEART RATE: 64 BPM | OXYGEN SATURATION: 97 % | HEIGHT: 70 IN | BODY MASS INDEX: 25.17 KG/M2 | RESPIRATION RATE: 16 BRPM | WEIGHT: 175.8 LBS | DIASTOLIC BLOOD PRESSURE: 82 MMHG | TEMPERATURE: 97.6 F | SYSTOLIC BLOOD PRESSURE: 152 MMHG

## 2017-10-19 DIAGNOSIS — I25.5 ISCHEMIC CARDIOMYOPATHY: ICD-10-CM

## 2017-10-19 DIAGNOSIS — I50.22 SYSTOLIC CHF, CHRONIC (HCC): Primary | ICD-10-CM

## 2017-10-19 DIAGNOSIS — N18.9 CHRONIC RENAL INSUFFICIENCY, UNSPECIFIED STAGE: ICD-10-CM

## 2017-10-19 NOTE — PATIENT INSTRUCTIONS
Please restart your Entresto 49/51mg, 1 tablet by mouth twice a week. Continue to monitor your weight every day. Take your bumex as needed if you gain more than 2 lbs in a day or more than 5 lbs in a week. Learning About Heart Failure Zones  What are heart failure zones? Heart failure zones give you an easy way to see changes in your heart failure symptoms. They also tell you when you need to get help. Check every day to see which zone you are in. Green zone. You are doing well. This is where you want to be. · Your weight is stable. This means it is not going up or down. · You breathe easily. · You are sleeping well. You are able to lie flat without shortness of breath. · You can do your usual activities. Yellow zone. Be careful. Your symptoms are changing. Call your doctor. · You have new or increased shortness of breath. · You are dizzy or lightheaded, or you feel like you may faint. · You have sudden weight gain, such as more than 2 to 3 pounds in a day or 5 pounds in a week. (Your doctor may suggest a different range of weight gain.)  · You have increased swelling in your legs, ankles, or feet. · You are so tired or weak that you cannot do your usual activities. · You are not sleeping well. Shortness of breath wakes you up at night. You need extra pillows. Your doctor's name: ____________________________________________________________  Your doctor's contact information: _________________________________________________  Red zone. This is an emergency. Call 911. You have symptoms of sudden heart failure, such as:  · You have severe trouble breathing. · You cough up pink, foamy mucus. · You have a new irregular or fast heartbeat. You have symptoms of a heart attack. These may include:  · Chest pain or pressure, or a strange feeling in the chest.  · Sweating. · Shortness of breath. · Nausea or vomiting.   · Pain, pressure, or a strange feeling in the back, neck, jaw, or upper belly or in one or both shoulders or arms. · Lightheadedness or sudden weakness. · A fast or irregular heartbeat. If you have symptoms of a heart attack: After you call 911, the  may tell you to chew 1 adult-strength or 2 to 4 low-dose aspirin. Wait for an ambulance. Do not try to drive yourself. Follow-up care is a key part of your treatment and safety. Be sure to make and go to all appointments, and call your doctor if you are having problems. It's also a good idea to know your test results and keep a list of the medicines you take. Where can you learn more? Go to http://erik-dianna.info/. Enter T174 in the search box to learn more about \"Learning About Heart Failure Zones. \"  Current as of: February 23, 2017  Content Version: 11.3  © 3194-8510 [employer]. Care instructions adapted under license by [employer] (which disclaims liability or warranty for this information). If you have questions about a medical condition or this instruction, always ask your healthcare professional. [place] [address] any warranty or liability for your use of this information.

## 2017-10-19 NOTE — MR AVS SNAPSHOT
Visit Information Date & Time Provider Department Dept. Phone Encounter #  
 10/19/2017  8:30 AM Damaris Bonner MD [address] [id number] Follow-up Instructions Return in about 1 month (around 11/19/2017) for Heart Failure. Your Appointments 12/4/2017  9:30 AM  
ESTABLISHED PATIENT with Lester Handy MD  
[address] [facility] [address]) Appt Note: 4mo fuv for labs [address] [address] [address]  
[phone]  
  
   
 [address] [address] [address] [address]  
  
    
 7/25/2018 10:00 AM  
ESTABLISHED PATIENT with Jason Simon MD  
[facility] ([address]) Appt Note: annual  
 [address] [facility] [address] [facility]  
[address] [facility] [facility] [address] [address] [address] Upcoming Health Maintenance Date Due  
 FOOT EXAM Q1 11/23/1962 MICROALBUMIN Q1 11/23/1962 EYE EXAM RETINAL OR DILATED Q1 11/23/1962 Pneumococcal 19-64 Medium Risk (1 of 1 - PPSV23) 11/23/1971 DTaP/Tdap/Td series (1 - Tdap) 11/23/1973 ZOSTER VACCINE AGE 60> 9/23/2012 INFLUENZA AGE 9 TO ADULT 8/1/2017 LIPID PANEL Q1 1/19/2018 FOBT Q 1 YEAR AGE 50-75 2/16/2018 HEMOGLOBIN A1C Q6M 2/17/2018 Allergies as of 10/19/2017  Review Complete On: 10/19/2017 By: Eugene Billings NP Severity Noted Reaction Type Reactions Heparin (Porcine)  02/07/2017    Unknown (comments) Current Immunizations  Reviewed on 2/14/2017 No immunizations on file. Not reviewed this visit You Were Diagnosed With   
  
 Codes Comments Systolic CHF, chronic (HCC)    -  Primary ICD-10-CM: V69.72 ICD-9-CM: 428.22, 428.0 Ischemic cardiomyopathy     ICD-10-CM: I25.5 ICD-9-CM: 414.8 Chronic renal insufficiency, unspecified stage     ICD-10-CM: N18.9 ICD-9-CM: 459. 9 Vitals BP Pulse Temp Resp Height(growth percentile) Weight(growth percentile) 152/82 (BP 1 Location: Right arm, BP Patient Position: Sitting) 64 97.6 °F (36.4 °C) (Oral) 16 5' 10\" (1.778 m) 175 lb 12.8 oz (79.7 kg) SpO2 BMI Smoking Status 97% 25.22 kg/m2 Former Smoker Vitals History BMI and BSA Data Body Mass Index Body Surface Area  
 25.22 kg/m 2 1.98 m 2 Preferred Pharmacy Pharmacy Name Phone [facility] [place] [phone] Your Updated Medication List  
  
   
This list is accurate as of: 10/19/17  9:06 AM.  Always use your most recent med list.  
  
  
  
  
 amiodarone 200 mg tablet Commonly known as:  CORDARONE  
TAKE ONE TABLET BY MOUTH EVERY 12 HOURS  
  
 aspirin delayed-release 81 mg tablet Take 1 Tab by mouth daily. atorvastatin 10 mg tablet Commonly known as:  LIPITOR  
TAKE ONE TABLET BY MOUTH ONCE DAILY  
  
 bumetanide 1 mg tablet Commonly known as:  Garlon Salen Take 1 mg by mouth as needed. carvedilol 3.125 mg tablet Commonly known as:  COREG  
TAKE ONE TABLET BY MOUTH TWICE DAILY WITH MEALS  
  
 clopidogrel 75 mg Tab Commonly known as:  PLAVIX Take 1 Tab by mouth daily. DULoxetine 30 mg capsule Commonly known as:  CYMBALTA Take 1 Cap by mouth daily. insulin NPH/insulin regular 100 unit/mL (70-30) injection Commonly known as:  NovoLIN 70/30  
10 units in am and 8 units in pm  
  
 Insulin Syringes (Disposable) 1 mL Syrg Pt to take 10 units w/ breakfast and 8 units w/ dinner NORMAL SALINE FLUSH 0.9 % Generic drug:  sodium chloride 5-10 mL by IntraVENous Push route as needed. patiromer calcium sorbitex 8.4 gram powder Commonly known as:  VELTASSA Take 8.4 g by mouth daily. sacubitril-valsartan 49-51 mg Tab tablet Commonly known as:  ENTRESTO Take 1 Tab by mouth two (2) times a day. spironolactone 25 mg tablet Commonly known as:  ALDACTONE Take 1 Tab by mouth daily. TYLENOL EXTRA STRENGTH 500 mg tablet Generic drug:  acetaminophen Take 1,000 mg by mouth every six (6) hours as needed for Pain. Indications: BACK PAIN We Performed the Following METABOLIC PANEL, BASIC [08475 CPT(R)] Follow-up Instructions Return in about 1 month (around 11/19/2017) for Heart Failure. Patient Instructions Please restart your Entresto 49/51mg, 1 tablet by mouth twice a week. Continue to monitor your weight every day. Take your bumex as needed if you gain more than 2 lbs in a day or more than 5 lbs in a week. Learning About Heart Failure Zones What are heart failure zones? Heart failure zones give you an easy way to see changes in your heart failure symptoms. They also tell you when you need to get help. Check every day to see which zone you are in. Green zone. You are doing well. This is where you want to be. · Your weight is stable. This means it is not going up or down. · You breathe easily. · You are sleeping well. You are able to lie flat without shortness of breath. · You can do your usual activities. Yellow zone. Be careful. Your symptoms are changing. Call your doctor. · You have new or increased shortness of breath. · You are dizzy or lightheaded, or you feel like you may faint. · You have sudden weight gain, such as more than 2 to 3 pounds in a day or 5 pounds in a week. (Your doctor may suggest a different range of weight gain.) · You have increased swelling in your legs, ankles, or feet. · You are so tired or weak that you cannot do your usual activities. · You are not sleeping well. Shortness of breath wakes you up at night. You need extra pillows. Your doctor's name: ____________________________________________________________ Your doctor's contact information: _________________________________________________ Red zone. This is an emergency. Call 911. You have symptoms of sudden heart failure, such as: 
· You have severe trouble breathing. · You cough up pink, foamy mucus. · You have a new irregular or fast heartbeat. You have symptoms of a heart attack. These may include: · Chest pain or pressure, or a strange feeling in the chest. 
· Sweating. · Shortness of breath. · Nausea or vomiting. · Pain, pressure, or a strange feeling in the back, neck, jaw, or upper belly or in one or both shoulders or arms. · Lightheadedness or sudden weakness. · A fast or irregular heartbeat. If you have symptoms of a heart attack: After you call 911, the  may tell you to chew 1 adult-strength or 2 to 4 low-dose aspirin. Wait for an ambulance. Do not try to drive yourself. Follow-up care is a key part of your treatment and safety. Be sure to make and go to all appointments, and call your doctor if you are having problems. It's also a good idea to know your test results and keep a list of the medicines you take. Where can you learn more? Go to http://erik-dianna.info/. Enter T174 in the search box to learn more about \"Learning About Heart Failure Zones. \" Current as of: February 23, 2017 Content Version: 11.3 © 6307-9010 [employer]. Care instructions adapted under license by [employer] (which disclaims liability or warranty for this information). If you have questions about a medical condition or this instruction, always ask your healthcare professional. [place] [address] any warranty or liability for your use of this information. Introducing [facility]! [employer] introduces [employer] patient portal. Now you can access parts of your medical record, email your doctor's office, and request medication refills online. 1. In your internet browser, go to [email or web address] 2. Click on the First Time User? Click Here link in the Sign In box. You will see the New Member Sign Up page. 3. Enter your Schedulicity Access Code exactly as it appears below. You will not need to use this code after youve completed the sign-up process. If you do not sign up before the expiration date, you must request a new code. · [employer] Access Code: [email or web address] Expires: 10/22/2017 12:10 PM 
 
4. Enter the last four digits of your Social Security Number (xxxx) and Date of Birth (mm/dd/yyyy) as indicated and click Submit. You will be taken to the next sign-up page. 5. Create a Schedulicity ID. This will be your Schedulicity login ID and cannot be changed, so think of one that is secure and easy to remember. 6. Create a Schedulicity password. You can change your password at any time. 7. Enter your Password Reset Question and Answer. This can be used at a later time if you forget your password. 8. Enter your e-mail address. You will receive e-mail notification when new information is available in [address] 9. Click Sign Up. You can now view and download portions of your medical record. 10. Click the Download Summary menu link to download a portable copy of your medical information. If you have questions, please visit the Frequently Asked Questions section of the Schedulicity website. Remember, [employer]icity is NOT to be used for urgent needs. For medical emergencies, dial 911. Now available from your iPhone and Android! Please provide this summary of care documentation to your next provider. Your primary care clinician is listed as MICHAEL BALLARD. If you have any questions after today's visit, please call [phone].

## 2017-10-19 NOTE — LETTER
10/19/2017 9:29 AM 
 
Patient:  Disha Workman. YOB: 1950 Date of Visit: 10/19/2017 Dear Lauren Galvan MD 
[address]  [address] [facility] [address] VIA In Basket Harshad Ames MD 
[address] [place] [address] VIA In Basket 
 : Thank you for referring Mr. Bruce Portillo to me for evaluation/treatment. Below are the relevant portions of my assessment and plan of care. [facility] Progress Note DOS:  10/19/2017 REFERRING PROVIDER:  Harshad Ames MD 
PRIMARY CARE PHYSICIAN: Lauren Galvan MD 
 
 
HPI: 61y.o. year old male with a history of chronic systolic heart failure secondary to ICM. His history is also pertinent for CAD s/p PCI of LM,  of RCA, PAF s/p ICD, Hep C, past GI bleed, and tobacco abuse. He had been on home milrinone infusion through a PICC and has been slowly being weaned from this. He was last seen in our clinic 2 weeks ago and after reviewing labs, had the milrinone stopped and the PICC lien removed by home health on 10/11/17. He presents to clinic today for follow up of his heart failure. Since being off the milrinone, he reports that he has been feeling great, has been able to walk through the grocery store, do light work outside and denies any heart failure symptoms such as fatigue, dyspnea, orthopnea, edema, or lightheadedness. He does report that he has not been taking the entresto for about 2 weeks though, as he ran out. Chief Complaint Patient presents with  Follow-up  
  no complaints ROS:   
General:  negative HEENT: negative Pulmonary: no cough, shortness of breath, or wheezing Cardiac: negative for chest pain, chest pressure/discomfort, dyspnea, palpitations, fatigue, orthopnea, paroxysmal nocturnal dyspnea, lower extremity edema, dyspnea on exertion, dizziness GI:  no abdominal pain, change in bowel habits, or black or bloody stools Musculo: negative Neuro: negative Skin:  negative History: 
Past Medical History:  
Diagnosis Date  [facility] 1/20/2017 A. Echo (1/19/17):  EF 5-10% with severe GHK,. Mildly dil LA. Mild TR. PASP 46.  
 Chronic renal insufficiency 3/6/2017  Diabetes mellitus ([facility] [address] 3/6/2017  Dyslipidemia 3/6/2017 A. FLP (1/19/17): Tot 120, , HDL 19, LDL 76 (no Rx).  H/O: GI bleed 3/6/2017  Hepatitis C 3/6/2017  Paroxysmal atrial fibrillation ([facility] 3/6/2017  Peripheral vascular disease ([facility] 9/18/2017 A. RICKY (3/20/17): Right 0.58, Left 0.56. Past Surgical History:  
Procedure Laterality Date  COLONOSCOPY N/A 2/17/2017 COLONOSCOPY performed by Winford Phoenix. Laine Huff MD at [facility] ENDOSCOPY  
 HX COLONOSCOPY [facility] 2/2017 Social History Social History  Marital status:  Spouse name: N/A  
 Number of children: N/A  
 Years of education: N/A Occupational History  Not on file. Social History Main Topics  Smoking status: Former Smoker Packs/day: 2.00 Years: 45.00 Quit date: 1/20/2017  Smokeless tobacco: Never Used  Alcohol use No  
 Drug use: No  
 Sexual activity: No  
 
Other Topics Concern  Not on file Social History Narrative Family History Problem Relation Age of Onset  No Known Problems Mother  Cancer Father  Colon Cancer Father Current Medications:  
Current Outpatient Prescriptions Medication Sig Dispense Refill  insulin NPH/insulin regular (NOVOLIN 70/30) 100 unit/mL (70-30) injection 10 units in am and 8 units in pm 10 mL 1  
 patiromer calcium sorbitex (VELTASSA) 8.4 gram powder Take 8.4 g by mouth daily. 4 Packet 0  
 spironolactone (ALDACTONE) 25 mg tablet Take 1 Tab by mouth daily. 90 Tab 1  clopidogrel (PLAVIX) 75 mg tab Take 1 Tab by mouth daily. 30 Tab 4  
 amiodarone (CORDARONE) 200 mg tablet TAKE ONE TABLET BY MOUTH EVERY 12 HOURS 60 Tab 4  atorvastatin (LIPITOR) 10 mg tablet TAKE ONE TABLET BY MOUTH ONCE DAILY 30 Tab 4  carvedilol (COREG) 3.125 mg tablet TAKE ONE TABLET BY MOUTH TWICE DAILY WITH MEALS 60 Tab 4  
 bumetanide (BUMEX) 1 mg tablet Take 1 mg by mouth as needed.  acetaminophen (TYLENOL EXTRA STRENGTH) 500 mg tablet Take 1,000 mg by mouth every six (6) hours as needed for Pain. Indications: BACK PAIN    
 DULoxetine (CYMBALTA) 30 mg capsule Take 1 Cap by mouth daily. 30 Cap 5  
 aspirin delayed-release 81 mg tablet Take 1 Tab by mouth daily. 30 Tab 1  
 Insulin Syringes, Disposable, 1 mL syrg Pt to take 10 units w/ breakfast and 8 units w/ dinner 500 Syringe 1  
 sacubitril-valsartan (ENTRESTO) 49 mg/51 mg tablet Take 1 Tab by mouth two (2) times a day. 60 Tab 6  
 sodium chloride (NORMAL SALINE FLUSH) 0.9 % 5-10 mL by IntraVENous Push route as needed. Allergies: Allergies Allergen Reactions  Heparin (Porcine) Unknown (comments) Physical Exam:  
Physical Exam  
Constitutional: He is oriented to person, place, and time. He appears well-developed and well-nourished. No distress. HENT:  
Head: Normocephalic and atraumatic. Neck: Neck supple. No JVD present. Cardiovascular: Normal rate, regular rhythm, normal heart sounds and intact distal pulses. Exam reveals no gallop and no friction rub. No murmur heard. Pulmonary/Chest: Effort normal and breath sounds normal. No respiratory distress. Abdominal: Soft. He exhibits no distension. There is no tenderness. Neurological: He is alert and oriented to person, place, and time. Skin: Skin is warm and dry. Psychiatric: He has a normal mood and affect. Vitals:   
Visit Vitals  /82 (BP 1 Location: Right arm, BP Patient Position: Sitting)  Pulse 64  Temp 97.6 °F (36.4 °C) (Oral)  Resp 16  
 Ht 5' 10\" (1.778 m)  Wt 175 lb 12.8 oz (79.7 kg)  SpO2 97%  BMI 25.22 kg/m2 Recent Labs: Labs Latest Ref Rng & Units 9/21/2017 9/5/2017 Albumin 3.6 - 4.8 g/dL - 4.0 Calcium 8.6 - 10.2 mg/dL 8.8 8.8 SGOT 0 - 40 IU/L - 25 Glucose 65 - 99 mg/dL 180(H) 165(H) BUN 8 - 27 mg/dL 38(H) 29(H) Creatinine 0.76 - 1.27 mg/dL 1.82(H) 1.65(H) Sodium 134 - 144 mmol/L 141 142 Potassium 3.5 - 5.2 mmol/L 4.4 4.3 Some recent data might be hidden Impression / Plan: 
Chronic systolic heart failure (EF 25%), d/t ICM, ACC/AHA Stage D, NYHA class II Off milrinone now about 1 week Currently denies any heart failure symptoms Continue Coreg and Spironolactone Has note been taking Entresto for [employer] Resume Entresto 49/51mg - monitor renal function closely BMP today Cont Veltassa Continue Bumex PRN - no recent use Continue daily weights, sodium and fluid restriction Follow up in 1 month 
   
CAD s/p PCI Continue ASA, Plavix, Coreg and statin per cardiology 
   
CKD Last Creatinine slightly up-1.8, will recheck today Monitor closely with recent discontinuation of milrinone and restarting Entresto 
   
PAF Rate controlled Managed per cardiology Continue amiodarone and Coreg  
   
H/O lower GIB Still has colonic polyp and AVM which will require treatment at some point once cardiac situation is stabilized 
   
Diabetes Insulin management by PCP MONICA Dykes [address] [facility] [address] [address] [phone] 
83 hour VAD/HF Pager: [phone] If you have questions, please do not hesitate to call me. I look forward to following Mr. Teresa Roberts along with you. Sincerely, Larry Connor MD

## 2017-10-19 NOTE — COMMUNICATION BODY
[facility] Progress Note      DOS:  10/19/2017  REFERRING PROVIDER:  Mikel Harris MD  PRIMARY CARE PHYSICIAN: Lisa Hook MD      HPI: 61y.o. year old male with a history of chronic systolic heart failure secondary to ICM. His history is also pertinent for CAD s/p PCI of LM,  of RCA, PAF s/p ICD, Hep C, past GI bleed, and tobacco abuse. He had been on home milrinone infusion through a PICC and has been slowly being weaned from this. He was last seen in our clinic 2 weeks ago and after reviewing labs, had the milrinone stopped and the PICC lien removed by home health on 10/11/17. He presents to clinic today for follow up of his heart failure. Since being off the milrinone, he reports that he has been feeling great, has been able to walk through the grocery store, do light work outside and denies any heart failure symptoms such as fatigue, dyspnea, orthopnea, edema, or lightheadedness. He does report that he has not been taking the entresto for about 2 weeks though, as he ran out. Chief Complaint   Patient presents with    Follow-up     no complaints       ROS:    General:  negative  HEENT: negative  Pulmonary: no cough, shortness of breath, or wheezing  Cardiac: negative for chest pain, chest pressure/discomfort, dyspnea, palpitations, fatigue, orthopnea, paroxysmal nocturnal dyspnea, lower extremity edema, dyspnea on exertion, dizziness  GI:  no abdominal pain, change in bowel habits, or black or bloody stools  Musculo: negative  Neuro: negative  Skin:  negative      History:  Past Medical History:   Diagnosis Date    Cardiomyopathy ([facility] 1/20/2017    A. Echo (1/19/17):  EF 5-10% with severe GHK,. Mildly dil LA. Mild TR. PASP 46.    Chronic renal insufficiency 3/6/2017    Diabetes mellitus ([address] 3/6/2017    Dyslipidemia 3/6/2017    A. FLP (1/19/17): Tot 120, , HDL 19, LDL 76 (no Rx).     H/O: GI bleed 3/6/2017    Hepatitis C 3/6/2017    Paroxysmal atrial fibrillation ([facility] 3/6/2017    Peripheral vascular disease ([facility] 9/18/2017    A. RICKY (3/20/17): Right 0.58, Left 0.56. Past Surgical History:   Procedure Laterality Date    COLONOSCOPY N/A 2/17/2017    COLONOSCOPY performed by Cee Aguilar. Teressa Pink MD at [facility] ENDOSCOPY   Concha Schilder COLONOSCOPY      [facility] 2/2017     Social History     Social History    Marital status:      Spouse name: N/A    Number of children: N/A    Years of education: N/A     Occupational History    Not on file. Social History Main Topics    Smoking status: Former Smoker     Packs/day: 2.00     Years: 45.00     Quit date: 1/20/2017    Smokeless tobacco: Never Used    Alcohol use No    Drug use: No    Sexual activity: No     Other Topics Concern    Not on file     Social History Narrative     Family History   Problem Relation Age of Onset    No Known Problems Mother     Cancer Father     Colon Cancer Father        Current Medications:   Current Outpatient Prescriptions   Medication Sig Dispense Refill    insulin NPH/insulin regular (NOVOLIN 70/30) 100 unit/mL (70-30) injection 10 units in am and 8 units in pm 10 mL 1    patiromer calcium sorbitex (VELTASSA) 8.4 gram powder Take 8.4 g by mouth daily. 4 Packet 0    spironolactone (ALDACTONE) 25 mg tablet Take 1 Tab by mouth daily. 90 Tab 1    clopidogrel (PLAVIX) 75 mg tab Take 1 Tab by mouth daily. 30 Tab 4    amiodarone (CORDARONE) 200 mg tablet TAKE ONE TABLET BY MOUTH EVERY 12 HOURS 60 Tab 4    atorvastatin (LIPITOR) 10 mg tablet TAKE ONE TABLET BY MOUTH ONCE DAILY 30 Tab 4    carvedilol (COREG) 3.125 mg tablet TAKE ONE TABLET BY MOUTH TWICE DAILY WITH MEALS 60 Tab 4    bumetanide (BUMEX) 1 mg tablet Take 1 mg by mouth as needed.  acetaminophen (TYLENOL EXTRA STRENGTH) 500 mg tablet Take 1,000 mg by mouth every six (6) hours as needed for Pain. Indications: BACK PAIN      DULoxetine (CYMBALTA) 30 mg capsule Take 1 Cap by mouth daily.  30 Cap 5    aspirin delayed-release 81 mg tablet Take 1 Tab by mouth daily. 30 Tab 1    Insulin Syringes, Disposable, 1 mL syrg Pt to take 10 units w/ breakfast and 8 units w/ dinner 500 Syringe 1    sacubitril-valsartan (ENTRESTO) 49 mg/51 mg tablet Take 1 Tab by mouth two (2) times a day. 60 Tab 6    sodium chloride (NORMAL SALINE FLUSH) 0.9 % 5-10 mL by IntraVENous Push route as needed. Allergies: Allergies   Allergen Reactions    Heparin (Porcine) Unknown (comments)           Physical Exam:   Physical Exam   Constitutional: He is oriented to person, place, and time. He appears well-developed and well-nourished. No distress. HENT:   Head: Normocephalic and atraumatic. Neck: Neck supple. No JVD present. Cardiovascular: Normal rate, regular rhythm, normal heart sounds and intact distal pulses. Exam reveals no gallop and no friction rub. No murmur heard. Pulmonary/Chest: Effort normal and breath sounds normal. No respiratory distress. Abdominal: Soft. He exhibits no distension. There is no tenderness. Neurological: He is alert and oriented to person, place, and time. Skin: Skin is warm and dry. Psychiatric: He has a normal mood and affect.        Vitals:    Visit Vitals    /82 (BP 1 Location: Right arm, BP Patient Position: Sitting)    Pulse 64    Temp 97.6 °F (36.4 °C) (Oral)    Resp 16    Ht 5' 10\" (1.778 m)    Wt 175 lb 12.8 oz (79.7 kg)    SpO2 97%    BMI 25.22 kg/m2           Recent Labs:   Labs Latest Ref Rng & Units 9/21/2017 9/5/2017   Albumin 3.6 - 4.8 g/dL - 4.0   Calcium 8.6 - 10.2 mg/dL 8.8 8.8   SGOT 0 - 40 IU/L - 25   Glucose 65 - 99 mg/dL 180(H) 165(H)   BUN 8 - 27 mg/dL 38(H) 29(H)   Creatinine 0.76 - 1.27 mg/dL 1.82(H) 1.65(H)   Sodium 134 - 144 mmol/L 141 142   Potassium 3.5 - 5.2 mmol/L 4.4 4.3   Some recent data might be hidden         Impression / Plan:  Chronic systolic heart failure (EF 25%), d/t ICM, ACC/AHA Stage D, NYHA class II  Off milrinone now about 1 week  Currently denies any heart failure symptoms  Continue Coreg and Spironolactone   Has note been taking Entresto for [employer] 49/51mg - monitor renal function closely  BMP today  Cont Veltassa  Continue Bumex PRN - no recent use  Continue daily weights, sodium and fluid restriction  Follow up in 1 month      CAD s/p PCI  Continue ASA, Plavix, Coreg and statin per cardiology      CKD  Last Creatinine slightly up-1.8, will recheck today  Monitor closely with recent discontinuation of milrinone and restarting Entresto      PAF  Rate controlled  Managed per cardiology  Continue amiodarone and Coreg       H/O lower GIB  Still has colonic polyp and AVM which will require treatment at some point once cardiac situation is stabilized      Diabetes  Insulin management by PCP      MONICA Nichols [address] [place] [address]   24 hour VAD/HF Pager: [phone]

## 2017-10-19 NOTE — PROGRESS NOTES
[facility] Progress Note      DOS:  10/19/2017  REFERRING PROVIDER:  Celio Miller MD  PRIMARY CARE PHYSICIAN: Brayden Ulloa MD      HPI: 61y.o. year old male with a history of chronic systolic heart failure secondary to ICM. His history is also pertinent for CAD s/p PCI of LM,  of RCA, PAF s/p ICD, Hep C, past GI bleed, and tobacco abuse. He had been on home milrinone infusion through a PICC and has been slowly being weaned from this. He was last seen in our clinic 2 weeks ago and after reviewing labs, had the milrinone stopped and the PICC lien removed by home health on 10/11/17. He presents to clinic today for follow up of his heart failure. Since being off the milrinone, he reports that he has been feeling great, has been able to walk through the grocery store, do light work outside and denies any heart failure symptoms such as fatigue, dyspnea, orthopnea, edema, or lightheadedness. He does report that he has not been taking the entresto for about 2 weeks though, as he ran out. Chief Complaint   Patient presents with    Follow-up     no complaints       ROS:    General:  negative  HEENT: negative  Pulmonary: no cough, shortness of breath, or wheezing  Cardiac: negative for chest pain, chest pressure/discomfort, dyspnea, palpitations, fatigue, orthopnea, paroxysmal nocturnal dyspnea, lower extremity edema, dyspnea on exertion, dizziness  GI:  no abdominal pain, change in bowel habits, or black or bloody stools  Musculo: negative  Neuro: negative  Skin:  negative      History:  Past Medical History:   Diagnosis Date    Cardiomyopathy ([facility] 1/20/2017    A. Echo (1/19/17):  EF 5-10% with severe GHK,. Mildly dil LA. Mild TR. PASP 46.    Chronic renal insufficiency 3/6/2017    Diabetes mellitus ([address] 3/6/2017    Dyslipidemia 3/6/2017    A. FLP (1/19/17): Tot 120, , HDL 19, LDL 76 (no Rx).     H/O: GI bleed 3/6/2017    Hepatitis C 3/6/2017    Paroxysmal atrial fibrillation ([facility] [address] 3/6/2017    Peripheral vascular disease ([facility] [address] 9/18/2017    A. RICKY (3/20/17): Right 0.58, Left 0.56. Past Surgical History:   Procedure Laterality Date    COLONOSCOPY N/A 2/17/2017    COLONOSCOPY performed by Jeanna Arriaga. Coni Bagley MD at [facility] ENDOSCOPY   [facility] COLONOSCOPY      [facility] 2/2017     Social History     Social History    Marital status:      Spouse name: N/A    Number of children: N/A    Years of education: N/A     Occupational History    Not on file. Social History Main Topics    Smoking status: Former Smoker     Packs/day: 2.00     Years: 45.00     Quit date: 1/20/2017    Smokeless tobacco: Never Used    Alcohol use No    Drug use: No    Sexual activity: No     Other Topics Concern    Not on file     Social History Narrative     Family History   Problem Relation Age of Onset    No Known Problems Mother     Cancer Father     Colon Cancer Father        Current Medications:   Current Outpatient Prescriptions   Medication Sig Dispense Refill    insulin NPH/insulin regular (NOVOLIN 70/30) 100 unit/mL (70-30) injection 10 units in am and 8 units in pm 10 mL 1    patiromer calcium sorbitex (VELTASSA) 8.4 gram powder Take 8.4 g by mouth daily. 4 Packet 0    spironolactone (ALDACTONE) 25 mg tablet Take 1 Tab by mouth daily. 90 Tab 1    clopidogrel (PLAVIX) 75 mg tab Take 1 Tab by mouth daily. 30 Tab 4    amiodarone (CORDARONE) 200 mg tablet TAKE ONE TABLET BY MOUTH EVERY 12 HOURS 60 Tab 4    atorvastatin (LIPITOR) 10 mg tablet TAKE ONE TABLET BY MOUTH ONCE DAILY 30 Tab 4    carvedilol (COREG) 3.125 mg tablet TAKE ONE TABLET BY MOUTH TWICE DAILY WITH MEALS 60 Tab 4    bumetanide (BUMEX) 1 mg tablet Take 1 mg by mouth as needed.  acetaminophen (TYLENOL EXTRA STRENGTH) 500 mg tablet Take 1,000 mg by mouth every six (6) hours as needed for Pain. Indications: BACK PAIN      DULoxetine (CYMBALTA) 30 mg capsule Take 1 Cap by mouth daily.  30 Cap 5    aspirin delayed-release 81 mg tablet Take 1 Tab by mouth daily. 30 Tab 1    Insulin Syringes, Disposable, 1 mL syrg Pt to take 10 units w/ breakfast and 8 units w/ dinner 500 Syringe 1    sacubitril-valsartan (ENTRESTO) 49 mg/51 mg tablet Take 1 Tab by mouth two (2) times a day. 60 Tab 6    sodium chloride (NORMAL SALINE FLUSH) 0.9 % 5-10 mL by IntraVENous Push route as needed. Allergies: Allergies   Allergen Reactions    Heparin (Porcine) Unknown (comments)           Physical Exam:   Physical Exam   Constitutional: He is oriented to person, place, and time. He appears well-developed and well-nourished. No distress. HENT:   Head: Normocephalic and atraumatic. Neck: Neck supple. No JVD present. Cardiovascular: Normal rate, regular rhythm, normal heart sounds and intact distal pulses. Exam reveals no gallop and no friction rub. No murmur heard. Pulmonary/Chest: Effort normal and breath sounds normal. No respiratory distress. Abdominal: Soft. He exhibits no distension. There is no tenderness. Neurological: He is alert and oriented to person, place, and time. Skin: Skin is warm and dry. Psychiatric: He has a normal mood and affect.        Vitals:    Visit Vitals    /82 (BP 1 Location: Right arm, BP Patient Position: Sitting)    Pulse 64    Temp 97.6 °F (36.4 °C) (Oral)    Resp 16    Ht 5' 10\" (1.778 m)    Wt 175 lb 12.8 oz (79.7 kg)    SpO2 97%    BMI 25.22 kg/m2           Recent Labs:   Labs Latest Ref Rng & Units 9/21/2017 9/5/2017   Albumin 3.6 - 4.8 g/dL - 4.0   Calcium 8.6 - 10.2 mg/dL 8.8 8.8   SGOT 0 - 40 IU/L - 25   Glucose 65 - 99 mg/dL 180(H) 165(H)   BUN 8 - 27 mg/dL 38(H) 29(H)   Creatinine 0.76 - 1.27 mg/dL 1.82(H) 1.65(H)   Sodium 134 - 144 mmol/L 141 142   Potassium 3.5 - 5.2 mmol/L 4.4 4.3   Some recent data might be hidden         Impression / Plan:  Chronic systolic heart failure (EF 25%), d/t ICM, ACC/AHA Stage D, NYHA class II  Off milrinone now about 1 week  Currently denies any heart failure symptoms  Continue Coreg and Spironolactone   Has note been taking Entresto for SNAPP' 49/51mg - monitor renal function closely  BMP today  Cont Veltassa  Continue Bumex PRN - no recent use  Continue daily weights, sodium and fluid restriction  Follow up in 1 month      CAD s/p PCI  Continue ASA, Plavix, Coreg and statin per cardiology      CKD  Last Creatinine slightly up-1.8, will recheck today  Monitor closely with recent discontinuation of milrinone and restarting Entresto      PAF  Rate controlled  Managed per cardiology  Continue amiodarone and Coreg       H/O lower GIB  Still has colonic polyp and AVM which will require treatment at some point once cardiac situation is stabilized      Diabetes  Insulin management by PCP      Jones Red, MONICA Roman [address]  [address]   24 hour VAD/HF Pager: [phone]

## 2017-10-19 NOTE — PROGRESS NOTES
The patient notified the provider he has not been taking his entresto as he's been awaiting the [facility] calling to have him  his shipment from the office.  shared with the patient that he's never picked up the medication in the past from the office - it's always been delivered to his home from [place].  provided the patient with the [facility] contact number to call and order his entresto.     Terrell Shaw, MSW, LCSW    Clinical    Jennifer Roman [phone]

## 2017-10-20 ENCOUNTER — TELEPHONE (OUTPATIENT)
Dept: CARDIOLOGY CLINIC | Age: 67
End: 2017-10-20

## 2017-10-20 LAB
BUN SERPL-MCNC: 30 MG/DL (ref 6–24)
BUN/CREAT SERPL: 20 (ref 9–20)
CALCIUM SERPL-MCNC: 9.7 MG/DL (ref 8.7–10.2)
CHLORIDE SERPL-SCNC: 101 MMOL/L (ref 96–106)
CO2 SERPL-SCNC: 23 MMOL/L (ref 18–29)
CREAT SERPL-MCNC: 1.47 MG/DL (ref 0.76–1.27)
GFR SERPLBLD CREATININE-BSD FMLA CKD-EPI: 53 ML/MIN/1.73
GFR SERPLBLD CREATININE-BSD FMLA CKD-EPI: 62 ML/MIN/1.73
GLUCOSE SERPL-MCNC: 160 MG/DL (ref 65–99)
POTASSIUM SERPL-SCNC: 4.3 MMOL/L (ref 3.5–5.2)
SODIUM SERPL-SCNC: 142 MMOL/L (ref 134–144)

## 2017-10-20 NOTE — TELEPHONE ENCOUNTER
Called patient and gave him lab results per Errol Blevins:  BUN and Creatinine much better, recheck in one month. Placed on lab list.  Patient states he called patient assistance and arranged for entresto to be delivered.

## 2017-11-13 DIAGNOSIS — R06.02 SHORTNESS OF BREATH: ICD-10-CM

## 2017-11-13 DIAGNOSIS — I50.22 SYSTOLIC CHF, CHRONIC (HCC): Primary | ICD-10-CM

## 2017-11-14 ENCOUNTER — TELEPHONE (OUTPATIENT)
Dept: CARDIOLOGY CLINIC | Age: 67
End: 2017-11-14

## 2017-11-15 ENCOUNTER — TELEPHONE (OUTPATIENT)
Dept: CARDIOLOGY CLINIC | Age: 67
End: 2017-11-15

## 2017-11-15 NOTE — TELEPHONE ENCOUNTER
Telephone Call RE:  Appointment reminder     Outcome:     [] Patient confirmed appointment   [] Patient rescheduled appointment for    [] Unable to reach   [x] Left message              [] Other:       Antonella Juárez

## 2017-11-16 ENCOUNTER — OFFICE VISIT (OUTPATIENT)
Dept: CARDIOLOGY CLINIC | Age: 67
End: 2017-11-16

## 2017-11-16 VITALS
BODY MASS INDEX: 25.88 KG/M2 | DIASTOLIC BLOOD PRESSURE: 76 MMHG | OXYGEN SATURATION: 99 % | WEIGHT: 180.8 LBS | SYSTOLIC BLOOD PRESSURE: 118 MMHG | HEIGHT: 70 IN | TEMPERATURE: 97.6 F | RESPIRATION RATE: 16 BRPM | HEART RATE: 67 BPM

## 2017-11-16 DIAGNOSIS — N18.9 CHRONIC RENAL IMPAIRMENT, UNSPECIFIED CKD STAGE: ICD-10-CM

## 2017-11-16 DIAGNOSIS — I25.5 ISCHEMIC CARDIOMYOPATHY: Primary | ICD-10-CM

## 2017-11-16 DIAGNOSIS — I50.22 SYSTOLIC CHF, CHRONIC (HCC): ICD-10-CM

## 2017-11-16 DIAGNOSIS — I73.9 PERIPHERAL VASCULAR DISEASE (HCC): ICD-10-CM

## 2017-11-16 DIAGNOSIS — E11.8 TYPE 2 DIABETES MELLITUS WITH COMPLICATION, UNSPECIFIED LONG TERM INSULIN USE STATUS: ICD-10-CM

## 2017-11-16 NOTE — COMMUNICATION BODY
[facility] Progress Note      DOS:  11/16/2017  REFERRING PROVIDER:  Celio Miller MD  PRIMARY CARE PHYSICIAN: Brayden Ulloa MD      HPI: 61y.o. year old male with a history of chronic systolic heart failure secondary to ICM. His history is also pertinent for CAD s/p PCI of LM,  of RCA, PAF s/p ICD, Hep C, past GI bleed, and tobacco abuse. He had been on home milrinone infusion through a PICC and has been slowly being weaned from this. He was last seen in our clinic 2 weeks ago and after reviewing labs, had the milrinone stopped and the PICC lien removed by home health on 10/11/17. He presents to clinic today for follow up of his heart failure. Since his last visit, he has been doing well, no acute complaints, doing his usual activities, follow a low sodium diet as best he can. Compliant with his medications. He has been off on milrinone for 6 weeks, states he has more energy, denies dizziness or lightheadedness. Chief Complaint   Patient presents with    Follow-up     no complaints       ROS:    General:  negative  HEENT: negative  Pulmonary: no cough, shortness of breath, or wheezing  Cardiac: negative   GI:  Negative   Musculo: negative  Neuro: negative  Skin:  negative      History:  Past Medical History:   Diagnosis Date    Cardiomyopathy ([address] 1/20/2017    A. Echo (1/19/17):  EF 5-10% with severe GHK,. Mildly dil LA. Mild TR. PASP 46.    Chronic renal insufficiency 3/6/2017    Diabetes mellitus ([address] 3/6/2017    Dyslipidemia 3/6/2017    A. FLP (1/19/17): Tot 120, , HDL 19, LDL 76 (no Rx).  H/O: GI bleed 3/6/2017    Hepatitis C 3/6/2017    Paroxysmal atrial fibrillation ([address] 3/6/2017    Peripheral vascular disease ([address] 9/18/2017    A. RICKY (3/20/17): Right 0.58, Left 0.56. Past Surgical History:   Procedure Laterality Date    COLONOSCOPY N/A 2/17/2017    COLONOSCOPY performed by Ada Potter.  Matt Moser MD at [facility] ENDOSCOPY    HX [facility]ONOSCOPY [facility] 2/2017     Social History     Social History    Marital status:      Spouse name: N/A    Number of children: N/A    Years of education: N/A     Occupational History    Not on file. Social History Main Topics    Smoking status: Former Smoker     Packs/day: 2.00     Years: 45.00     Quit date: 1/20/2017    Smokeless tobacco: Never Used    Alcohol use No    Drug use: No    Sexual activity: No     Other Topics Concern    Not on file     Social History Narrative     Family History   Problem Relation Age of Onset    No Known Problems Mother     Cancer Father     Colon Cancer Father        Current Medications:   Current Outpatient Prescriptions   Medication Sig Dispense Refill    insulin NPH/insulin regular (NOVOLIN 70/30) 100 unit/mL (70-30) injection 10 units in am and 8 units in pm 10 mL 1    patiromer calcium sorbitex (VELTASSA) 8.4 gram powder Take 8.4 g by mouth daily. 4 Packet 0    spironolactone (ALDACTONE) 25 mg tablet Take 1 Tab by mouth daily. 90 Tab 1    sacubitril-valsartan (ENTRESTO) 49 mg/51 mg tablet Take 1 Tab by mouth two (2) times a day. 60 Tab 6    clopidogrel (PLAVIX) 75 mg tab Take 1 Tab by mouth daily. 30 Tab 4    amiodarone (CORDARONE) 200 mg tablet TAKE ONE TABLET BY MOUTH EVERY 12 HOURS 60 Tab 4    atorvastatin (LIPITOR) 10 mg tablet TAKE ONE TABLET BY MOUTH ONCE DAILY 30 Tab 4    carvedilol (COREG) 3.125 mg tablet TAKE ONE TABLET BY MOUTH TWICE DAILY WITH MEALS 60 Tab 4    bumetanide (BUMEX) 1 mg tablet Take 1 mg by mouth as needed.  acetaminophen (TYLENOL EXTRA STRENGTH) 500 mg tablet Take 1,000 mg by mouth every six (6) hours as needed for Pain. Indications: BACK PAIN      DULoxetine (CYMBALTA) 30 mg capsule Take 1 Cap by mouth daily. 30 Cap 5    aspirin delayed-release 81 mg tablet Take 1 Tab by mouth daily.  30 Tab 1    Insulin Syringes, Disposable, 1 mL syrg Pt to take 10 units w/ breakfast and 8 units w/ dinner 500 Syringe 1       Allergies: Allergies   Allergen Reactions    Heparin (Porcine) Unknown (comments)       Physical Exam:   Physical Exam   Constitutional: He is oriented to person, place, and time. He appears well-developed and well-nourished. No distress. HENT:   Head: Normocephalic and atraumatic. Neck: Neck supple. No JVD present. Cardiovascular: Normal rate, regular rhythm, normal heart sounds and intact distal pulses. No murmur heard. Pulmonary/Chest: Effort normal and breath sounds normal. No respiratory distress. Abdominal: Soft. He exhibits no distension. There is no tenderness. Neurological: He is alert and oriented to person, place, and time. Skin: Skin is warm and dry. Psychiatric: He has a normal mood and affect.        Vitals:    Visit Vitals    /76 (BP 1 Location: Right arm, BP Patient Position: Sitting)    Pulse 67    Temp 97.6 °F (36.4 °C) (Oral)    Resp 16    Ht 5' 10\" (1.778 m)    Wt 180 lb 12.8 oz (82 kg)    SpO2 99%    BMI 25.94 kg/m2           Recent Labs:   Labs Latest Ref Rng & Units 10/19/2017 9/21/2017   Calcium 8.7 - 10.2 mg/dL 9.7 8.8   Glucose 65 - 99 mg/dL 160(H) 180(H)   BUN 6 - 24 mg/dL 30(H) 38(H)   Creatinine 0.76 - 1.27 mg/dL 1.47(H) 1.82(H)   Sodium 134 - 144 mmol/L 142 141   Potassium 3.5 - 5.2 mmol/L 4.3 4.4   Some recent data might be hidden         Impression / Plan:  Chronic systolic heart failure (EF 25%), d/t ICM, ACC/AHA Stage D, NYHA class II  Off milrinone now about 6 weeks  Currently denies any heart failure symptoms  Continue Coreg and Spironolactone   Increase Entresto to 97/103mg - monitor renal function closely  CMP today  Cont Veltassa  Continue Bumex PRN - last used months ago  Continue daily weights, sodium and fluid restriction  Follow up in 1 month      CAD s/p PCI  Continue ASA, Plavix, Coreg and statin per cardiology       CKD  Last Creatinine at baseline of 1.47 will recheck today  Monitor closely with Entresto      PAF  Rate controlled  Managed per cardiology  Continue amiodarone and Coreg       H/O lower GIB  Still has colonic polyp and AVM which will require treatment at some point once cardiac situation is stabilized likely Jan/Feb, will need to hold Plavix       Diabetes  Insulin management by PCP      Mariaelena Nikita, NP    Jennifer Anderson Roman [address]  [address] [place] [address]  [phone]  24 hour VAD/HF Pager: [phone]

## 2017-11-16 NOTE — PATIENT INSTRUCTIONS
Increase your Entresto to 97/103mg-  You can double up on the 49/51mg until the new prescription arrives    Labs today and in 2 weeks    Follow up in 1 month    Please cont to use a low sodium diet and be as active as tolerated    Ok to have colonoscopy around Jan/Feb

## 2017-11-16 NOTE — LETTER
11/16/2017 9:56 AM 
 
Patient:  Bina Miles. YOB: 1950 Date of Visit: 11/16/2017 Dear Alethea Herring MD 
[address] [address] VIA In Basket Karmen Sweet MD 
[address] [facility] [address] [facility] [address] [address] VIA In Basket Janie Abarca MD 
[facility] [address] [facility] [address] [address] VIA Facsimile: [phone] Babatunde Siegel MD 
[address] [facility] [address] [facility] [address] [address] VIA In Basket 
 : Thank you for referring Mr. Antwan Grace to me for evaluation/treatment. Below are the relevant portions of my assessment and plan of care. [facility] Progress Note DOS:  11/16/2017 REFERRING PROVIDER:  Karmen Sweet MD 
PRIMARY CARE PHYSICIAN: Alethea Herring MD 
 
 
HPI: 61y.o. year old male with a history of chronic systolic heart failure secondary to ICM. His history is also pertinent for CAD s/p PCI of LM,  of RCA, PAF s/p ICD, Hep C, past GI bleed, and tobacco abuse. He had been on home milrinone infusion through a PICC and has been slowly being weaned from this. He was last seen in our clinic 2 weeks ago and after reviewing labs, had the milrinone stopped and the PICC lien removed by home health on 10/11/17. He presents to clinic today for follow up of his heart failure. Since his last visit, he has been doing well, no acute complaints, doing his usual activities, follow a low sodium diet as best he can. Compliant with his medications. He has been off on milrinone for 6 weeks, states he has more energy, denies dizziness or lightheadedness. Chief Complaint Patient presents with  Follow-up  
  no complaints ROS:   
General:  negative HEENT: negative Pulmonary: no cough, shortness of breath, or wheezing Cardiac: negative GI:  Negative Musculo: negative Neuro: negative Skin:  negative History: 
Past Medical History:  
Diagnosis Date  Cardiomyopathy ([facility] 1/20/2017 A. Echo (1/19/17):  EF 5-10% with severe GHK,. Mildly dil LA. Mild TR. PASP 46.  
 Chronic renal insufficiency 3/6/2017  Diabetes mellitus ([facility] 3/6/2017  Dyslipidemia 3/6/2017 A. FLP (1/19/17): Tot 120, , HDL 19, LDL 76 (no Rx).  H/O: GI bleed 3/6/2017  Hepatitis C 3/6/2017  Paroxysmal atrial fibrillation ([facility] 3/6/2017  Peripheral vascular disease ([facility] 9/18/2017 A. RICKY (3/20/17): Right 0.58, Left 0.56. Past Surgical History:  
Procedure Laterality Date  COLONOSCOPY N/A 2/17/2017 COLONOSCOPY performed by Momo Connelly. Niranjan Vogel MD at [facility] ENDOSCOPY  
 HX COLONOSCOPY [facility] 2/2017 Social History Social History  Marital status:  Spouse name: N/A  
 Number of children: N/A  
 Years of education: N/A Occupational History  Not on file. Social History Main Topics  Smoking status: Former Smoker Packs/day: 2.00 Years: 45.00 Quit date: 1/20/2017  Smokeless tobacco: Never Used  Alcohol use No  
 Drug use: No  
 Sexual activity: No  
 
Other Topics Concern  Not on file Social History Narrative Family History Problem Relation Age of Onset  No Known Problems Mother  Cancer Father  Colon Cancer Father Current Medications:  
Current Outpatient Prescriptions Medication Sig Dispense Refill  insulin NPH/insulin regular (NOVOLIN 70/30) 100 unit/mL (70-30) injection 10 units in am and 8 units in pm 10 mL 1  
 patiromer calcium sorbitex (VELTASSA) 8.4 gram powder Take 8.4 g by mouth daily. 4 Packet 0  
 spironolactone (ALDACTONE) 25 mg tablet Take 1 Tab by mouth daily. 90 Tab 1  
 sacubitril-valsartan (ENTRESTO) 49 mg/51 mg tablet Take 1 Tab by mouth two (2) times a day. 60 Tab 6  clopidogrel (PLAVIX) 75 mg tab Take 1 Tab by mouth daily. 30 Tab 4  
 amiodarone (CORDARONE) 200 mg tablet TAKE ONE TABLET BY MOUTH EVERY 12 HOURS 60 Tab 4  atorvastatin (LIPITOR) 10 mg tablet TAKE ONE TABLET BY MOUTH ONCE DAILY 30 Tab 4  carvedilol (COREG) 3.125 mg tablet TAKE ONE TABLET BY MOUTH TWICE DAILY WITH MEALS 60 Tab 4  
 bumetanide (BUMEX) 1 mg tablet Take 1 mg by mouth as needed.  acetaminophen (TYLENOL EXTRA STRENGTH) 500 mg tablet Take 1,000 mg by mouth every six (6) hours as needed for Pain. Indications: BACK PAIN    
 DULoxetine (CYMBALTA) 30 mg capsule Take 1 Cap by mouth daily. 30 Cap 5  
 aspirin delayed-release 81 mg tablet Take 1 Tab by mouth daily. 30 Tab 1  
 Insulin Syringes, Disposable, 1 mL syrg Pt to take 10 units w/ breakfast and 8 units w/ dinner 500 Syringe 1 Allergies: Allergies Allergen Reactions  Heparin (Porcine) Unknown (comments) Physical Exam:  
Physical Exam  
Constitutional: He is oriented to person, place, and time. He appears well-developed and well-nourished. No distress. HENT:  
Head: Normocephalic and atraumatic. Neck: Neck supple. No JVD present. Cardiovascular: Normal rate, regular rhythm, normal heart sounds and intact distal pulses. No murmur heard. Pulmonary/Chest: Effort normal and breath sounds normal. No respiratory distress. Abdominal: Soft. He exhibits no distension. There is no tenderness. Neurological: He is alert and oriented to person, place, and time. Skin: Skin is warm and dry. Psychiatric: He has a normal mood and affect. Vitals:   
Visit Vitals  /76 (BP 1 Location: Right arm, BP Patient Position: Sitting)  Pulse 67  Temp 97.6 °F (36.4 °C) (Oral)  Resp 16  
 Ht 5' 10\" (1.778 m)  Wt 180 lb 12.8 oz (82 kg)  SpO2 99%  BMI 25.94 kg/m2 Recent Labs:  
Labs Latest Ref Rng & Units 10/19/2017 9/21/2017 Calcium 8.7 - 10.2 mg/dL 9.7 8.8 Glucose 65 - 99 mg/dL 160(H) 180(H) BUN 6 - 24 mg/dL 30(H) 38(H) Creatinine 0.76 - 1.27 mg/dL 1.47(H) 1.82(H) Sodium 134 - 144 mmol/L 142 141 Potassium 3.5 - 5.2 mmol/L 4.3 4.4 Some recent data might be hidden Impression / Plan: 
Chronic systolic heart failure (EF 25%), d/t ICM, ACC/AHA Stage D, NYHA class II Off milrinone now about 6 weeks Currently denies any heart failure symptoms Continue Coreg and Spironolactone Increase Entresto to 97/103mg - monitor renal function closely CMP today Cont Veltassa Continue Bumex PRN - last used months ago Continue daily weights, sodium and fluid restriction Follow up in 1 month 
   
CAD s/p PCI Continue ASA, Plavix, Coreg and statin per cardiology  
   
CKD Last Creatinine at baseline of 1.47 will recheck today Monitor closely with Milka Carr 
PAF Rate controlled Managed per cardiology Continue amiodarone and Coreg  
   
H/O lower GIB Still has colonic polyp and AVM which will require treatment at some point once cardiac situation is stabilized likely Jan/Feb, will need to hold Plavix  
   
Diabetes Insulin management by PCP MONICA Young Roman [address] [facility] [address] [address] 
[phone] 
23 hour VAD/HF Pager: [phone] If you have questions, please do not hesitate to call me. I look forward to following Mr. Itzel Daniels along with you. Sincerely, Shay Vasquez MD

## 2017-11-16 NOTE — MR AVS SNAPSHOT
Visit Information Date & Time Provider Department Dept. Phone Encounter #  
 11/16/2017  9:30 AM Marzena Savage MD [address] [id number] Your Appointments 12/4/2017  9:30 AM  
ESTABLISHED PATIENT with Jose Ramirez MD  
[address] [facility]) Appt Note: 4mo fuv for labs [address] [address] [address] [address]  
[phone]  
  
   
 [address] [address]  
  
    
 7/25/2018 10:00 AM  
ESTABLISHED PATIENT with Cherylene Dull, MD  
[facility] ([facility]) Appt Note: annual  
 [address] [facility] [address] [facility]  
[address] [facility] [place] [address] [address] Upcoming Health Maintenance Date Due  
 FOOT EXAM Q1 11/23/1962 MICROALBUMIN Q1 11/23/1962 EYE EXAM RETINAL OR DILATED Q1 11/23/1962 Pneumococcal 19-64 Medium Risk (1 of 1 - PPSV23) 11/23/1971 DTaP/Tdap/Td series (1 - Tdap) 11/23/1973 ZOSTER VACCINE AGE 60> 9/23/2012 Influenza Age 5 to Adult 8/1/2017 LIPID PANEL Q1 1/19/2018 FOBT Q 1 YEAR AGE 50-75 2/16/2018 HEMOGLOBIN A1C Q6M 2/17/2018 Allergies as of 11/16/2017  Review Complete On: 11/16/2017 By: Loulou Linn Severity Noted Reaction Type Reactions Heparin (Porcine)  02/07/2017    Unknown (comments) Current Immunizations  Reviewed on 2/14/2017 No immunizations on file. Not reviewed this visit You Were Diagnosed With   
  
 Codes Comments Ischemic cardiomyopathy    -  Primary ICD-10-CM: I25.5 ICD-9-CM: 414.8 Systolic CHF, chronic (HCC)     ICD-10-CM: I50.22 ICD-9-CM: 428.22, 428.0 Chronic renal impairment, unspecified CKD stage     ICD-10-CM: N18.9 ICD-9-CM: 910. 9 Type 2 diabetes mellitus with complication, unspecified long term insulin use status (HCC)     ICD-10-CM: E11.8 ICD-9-CM: 250.90 Peripheral vascular disease ([facility]     ICD-10-CM: I73.9 ICD-9-CM: 443. 9 Vitals BP Pulse Temp Resp Height(growth percentile) Weight(growth percentile) 118/76 (BP 1 Location: Right arm, BP Patient Position: Sitting) 67 97.6 °F (36.4 °C) (Oral) 16 5' 10\" (1.778 m) 180 lb 12.8 oz (82 kg) SpO2 BMI Smoking Status 99% 25.94 kg/m2 Former Smoker Vitals History BMI and BSA Data Body Mass Index Body Surface Area  
 25.94 kg/m 2 2.01 m 2 Preferred Pharmacy Pharmacy Name Phone [facility] [place], [address] [phone] Your Updated Medication List  
  
   
This list is accurate as of: 11/16/17  9:52 AM.  Always use your most recent med list.  
  
  
  
  
 amiodarone 200 mg tablet Commonly known as:  CORDARONE  
TAKE ONE TABLET BY MOUTH EVERY 12 HOURS  
  
 aspirin delayed-release 81 mg tablet Take 1 Tab by mouth daily. atorvastatin 10 mg tablet Commonly known as:  LIPITOR  
TAKE ONE TABLET BY MOUTH ONCE DAILY  
  
 bumetanide 1 mg tablet Commonly known as:  May Leslie Take 1 mg by mouth as needed. carvedilol 3.125 mg tablet Commonly known as:  COREG  
TAKE ONE TABLET BY MOUTH TWICE DAILY WITH MEALS  
  
 clopidogrel 75 mg Tab Commonly known as:  PLAVIX Take 1 Tab by mouth daily. DULoxetine 30 mg capsule Commonly known as:  CYMBALTA Take 1 Cap by mouth daily. insulin NPH/insulin regular 100 unit/mL (70-30) injection Commonly known as:  NovoLIN 70/30  
10 units in am and 8 units in pm  
  
 Insulin Syringes (Disposable) 1 mL Syrg Pt to take 10 units w/ breakfast and 8 units w/ dinner  
  
 patiromer calcium sorbitex 8.4 gram powder Commonly known as:  VELTASSA Take 8.4 g by mouth daily. sacubitril-valsartan  mg tablet Commonly known as:  ENTRESTO Take 1 Tab by mouth two (2) times a day. spironolactone 25 mg tablet Commonly known as:  ALDACTONE  
 Take 1 Tab by mouth daily. TYLENOL EXTRA STRENGTH 500 mg tablet Generic drug:  acetaminophen Take 1,000 mg by mouth every six (6) hours as needed for Pain. Indications: BACK PAIN Patient Instructions Increase your Entresto to 97/103mg-  You can double up on the 49/51mg until the new prescription arrives Labs today and in 2 weeks Follow up in 1 month Please cont to use a low sodium diet and be as active as tolerated Ok to have colonoscopy around [facility]! Joana Foreman introduces [employer] patient portal. Now you can access parts of your medical record, email your doctor's office, and request medication refills online. 1. In your internet browser, go to [email or web address] 2. Click on the First Time User? Click Here link in the Sign In box. You will see the New Member Sign Up page. 3. Enter your [employer] Access Code exactly as it appears below. You will not need to use this code after youve completed the sign-up process. If you do not sign up before the expiration date, you must request a new code. · [employer] Access Code: [email or web address] Expires: 1/21/2018  7:14 AM 
 
4. Enter the last four digits of your Social Security Number (xxxx) and Date of Birth (mm/dd/yyyy) as indicated and click Submit. You will be taken to the next sign-up page. 5. Create a [employer] ID. This will be your [employer] login ID and cannot be changed, so think of one that is secure and easy to remember. 6. Create a [employer] password. You can change your password at any time. 7. Enter your Password Reset Question and Answer. This can be used at a later time if you forget your password. 8. Enter your e-mail address. You will receive e-mail notification when new information is available in [address] 9. Click Sign Up. You can now view and download portions of your medical record.  
10. Click the Download Summary menu link to download a portable copy of your medical information. If you have questions, please visit the Frequently Asked Questions section of the [employer] website. Remember, [employer] is NOT to be used for urgent needs. For medical emergencies, dial 911. Now available from your iPhone and Android! Please provide this summary of care documentation to your next provider. Your primary care clinician is listed as MICHAEL BALLARD. If you have any questions after today's visit, please call [phone].

## 2017-11-16 NOTE — PROGRESS NOTES
[facility] Progress Note      DOS:  11/16/2017  REFERRING PROVIDER:  Javier Tolliver MD  PRIMARY CARE PHYSICIAN: Sandy Bray MD      HPI: 61y.o. year old male with a history of chronic systolic heart failure secondary to ICM. His history is also pertinent for CAD s/p PCI of LM,  of RCA, PAF s/p ICD, Hep C, past GI bleed, and tobacco abuse. He had been on home milrinone infusion through a PICC and has been slowly being weaned from this. He was last seen in our clinic 2 weeks ago and after reviewing labs, had the milrinone stopped and the PICC lien removed by home health on 10/11/17. He presents to clinic today for follow up of his heart failure. Since his last visit, he has been doing well, no acute complaints, doing his usual activities, follow a low sodium diet as best he can. Compliant with his medications. He has been off on milrinone for 6 weeks, states he has more energy, denies dizziness or lightheadedness. Chief Complaint   Patient presents with    Follow-up     no complaints       ROS:    General:  negative  HEENT: negative  Pulmonary: no cough, shortness of breath, or wheezing  Cardiac: negative   GI:  Negative   Musculo: negative  Neuro: negative  Skin:  negative      History:  Past Medical History:   Diagnosis Date    Cardiomyopathy ([address] 1/20/2017    A. Echo (1/19/17):  EF 5-10% with severe GHK,. Mildly dil LA. Mild TR. PASP 46.    Chronic renal insufficiency 3/6/2017    Diabetes mellitus ([address] 3/6/2017    Dyslipidemia 3/6/2017    A. FLP (1/19/17): Tot 120, , HDL 19, LDL 76 (no Rx).  H/O: GI bleed 3/6/2017    Hepatitis C 3/6/2017    Paroxysmal atrial fibrillation ([address] 3/6/2017    Peripheral vascular disease ([address] 9/18/2017    A. RICKY (3/20/17): Right 0.58, Left 0.56. Past Surgical History:   Procedure Laterality Date    COLONOSCOPY N/A 2/17/2017    COLONOSCOPY performed by Momo Connelly.  Niranjan Vogel MD at [facility] ENDOSCOPY    HX COLONOSCOPY [facility] [place] 2/2017     Social History     Social History    Marital status:      Spouse name: N/A    Number of children: N/A    Years of education: N/A     Occupational History    Not on file. Social History Main Topics    Smoking status: Former Smoker     Packs/day: 2.00     Years: 45.00     Quit date: 1/20/2017    Smokeless tobacco: Never Used    Alcohol use No    Drug use: No    Sexual activity: No     Other Topics Concern    Not on file     Social History Narrative     Family History   Problem Relation Age of Onset    No Known Problems Mother     Cancer Father     Colon Cancer Father        Current Medications:   Current Outpatient Prescriptions   Medication Sig Dispense Refill    insulin NPH/insulin regular (NOVOLIN 70/30) 100 unit/mL (70-30) injection 10 units in am and 8 units in pm 10 mL 1    patiromer calcium sorbitex (VELTASSA) 8.4 gram powder Take 8.4 g by mouth daily. 4 Packet 0    spironolactone (ALDACTONE) 25 mg tablet Take 1 Tab by mouth daily. 90 Tab 1    sacubitril-valsartan (ENTRESTO) 49 mg/51 mg tablet Take 1 Tab by mouth two (2) times a day. 60 Tab 6    clopidogrel (PLAVIX) 75 mg tab Take 1 Tab by mouth daily. 30 Tab 4    amiodarone (CORDARONE) 200 mg tablet TAKE ONE TABLET BY MOUTH EVERY 12 HOURS 60 Tab 4    atorvastatin (LIPITOR) 10 mg tablet TAKE ONE TABLET BY MOUTH ONCE DAILY 30 Tab 4    carvedilol (COREG) 3.125 mg tablet TAKE ONE TABLET BY MOUTH TWICE DAILY WITH MEALS 60 Tab 4    bumetanide (BUMEX) 1 mg tablet Take 1 mg by mouth as needed.  acetaminophen (TYLENOL EXTRA STRENGTH) 500 mg tablet Take 1,000 mg by mouth every six (6) hours as needed for Pain. Indications: BACK PAIN      DULoxetine (CYMBALTA) 30 mg capsule Take 1 Cap by mouth daily. 30 Cap 5    aspirin delayed-release 81 mg tablet Take 1 Tab by mouth daily.  30 Tab 1    Insulin Syringes, Disposable, 1 mL syrg Pt to take 10 units w/ breakfast and 8 units w/ dinner 500 Syringe 1       Allergies: Allergies   Allergen Reactions    Heparin (Porcine) Unknown (comments)       Physical Exam:   Physical Exam   Constitutional: He is oriented to person, place, and time. He appears well-developed and well-nourished. No distress. HENT:   Head: Normocephalic and atraumatic. Neck: Neck supple. No JVD present. Cardiovascular: Normal rate, regular rhythm, normal heart sounds and intact distal pulses. No murmur heard. Pulmonary/Chest: Effort normal and breath sounds normal. No respiratory distress. Abdominal: Soft. He exhibits no distension. There is no tenderness. Neurological: He is alert and oriented to person, place, and time. Skin: Skin is warm and dry. Psychiatric: He has a normal mood and affect.        Vitals:    Visit Vitals    /76 (BP 1 Location: Right arm, BP Patient Position: Sitting)    Pulse 67    Temp 97.6 °F (36.4 °C) (Oral)    Resp 16    Ht 5' 10\" (1.778 m)    Wt 180 lb 12.8 oz (82 kg)    SpO2 99%    BMI 25.94 kg/m2           Recent Labs:   Labs Latest Ref Rng & Units 10/19/2017 9/21/2017   Calcium 8.7 - 10.2 mg/dL 9.7 8.8   Glucose 65 - 99 mg/dL 160(H) 180(H)   BUN 6 - 24 mg/dL 30(H) 38(H)   Creatinine 0.76 - 1.27 mg/dL 1.47(H) 1.82(H)   Sodium 134 - 144 mmol/L 142 141   Potassium 3.5 - 5.2 mmol/L 4.3 4.4   Some recent data might be hidden         Impression / Plan:  Chronic systolic heart failure (EF 25%), d/t ICM, ACC/AHA Stage D, NYHA class II  Off milrinone now about 6 weeks  Currently denies any heart failure symptoms  Continue Coreg and Spironolactone   Increase Entresto to 97/103mg - monitor renal function closely  CMP today  Cont [place]  Continue Bumex PRN - last used months ago  Continue daily weights, sodium and fluid restriction  Follow up in 1 month      CAD s/p PCI  Continue ASA, Plavix, Coreg and statin per cardiology       CKD  Last Creatinine at baseline of 1.47 will recheck today  Monitor closely with Entresto      PAF  Rate controlled  Managed per cardiology  Continue amiodarone and Coreg       H/O lower GIB  Still has colonic polyp and AVM which will require treatment at some point once cardiac situation is stabilized likely Jan/Feb, will need to hold Plavix       Diabetes  Insulin management by PCP      MONICA Elizabeth [address] [address]  [phone]  24 hour VAD/HF Pager: [phone]

## 2017-11-18 LAB
ALBUMIN SERPL-MCNC: 4.5 G/DL (ref 3.6–4.8)
ALBUMIN/GLOB SERPL: 1.6 {RATIO} (ref 1.2–2.2)
ALP SERPL-CCNC: 66 IU/L (ref 39–117)
ALT SERPL-CCNC: 29 IU/L (ref 0–44)
AST SERPL-CCNC: 24 IU/L (ref 0–40)
BILIRUB SERPL-MCNC: 0.3 MG/DL (ref 0–1.2)
BUN SERPL-MCNC: 42 MG/DL (ref 8–27)
BUN/CREAT SERPL: 24 (ref 10–24)
CALCIUM SERPL-MCNC: 9.1 MG/DL (ref 8.6–10.2)
CHLORIDE SERPL-SCNC: 97 MMOL/L (ref 96–106)
CO2 SERPL-SCNC: 19 MMOL/L (ref 18–29)
CREAT SERPL-MCNC: 1.75 MG/DL (ref 0.76–1.27)
GFR SERPLBLD CREATININE-BSD FMLA CKD-EPI: 40 ML/MIN/1.73
GFR SERPLBLD CREATININE-BSD FMLA CKD-EPI: 47 ML/MIN/1.73
GLOBULIN SER CALC-MCNC: 2.9 G/DL (ref 1.5–4.5)
GLUCOSE SERPL-MCNC: 79 MG/DL (ref 65–99)
NT-PROBNP SERPL-MCNC: 953 PG/ML (ref 0–210)
POTASSIUM SERPL-SCNC: 5.4 MMOL/L (ref 3.5–5.2)
PROT SERPL-MCNC: 7.4 G/DL (ref 6–8.5)
SODIUM SERPL-SCNC: 144 MMOL/L (ref 134–144)

## 2017-11-20 ENCOUNTER — TELEPHONE (OUTPATIENT)
Dept: CARDIOLOGY CLINIC | Age: 67
End: 2017-11-20

## 2017-11-20 NOTE — TELEPHONE ENCOUNTER
I called patient and reviewed lab results. He will try to stay hydrated-states he is already drinking, but will have labs re-drawn next Wednesday. Patient states understanding and has no further questions.

## 2017-11-24 DIAGNOSIS — I50.22 SYSTOLIC CHF, CHRONIC (HCC): Primary | ICD-10-CM

## 2017-11-28 ENCOUNTER — TELEPHONE (OUTPATIENT)
Dept: CARDIOLOGY CLINIC | Age: 67
End: 2017-11-28

## 2017-11-28 NOTE — TELEPHONE ENCOUNTER
I left a message on patient's voice mail asking that he call back with the [facility] location he will go to tomorrow so that his orders can be faxed.

## 2017-12-01 ENCOUNTER — TELEPHONE (OUTPATIENT)
Dept: CARDIOLOGY CLINIC | Age: 67
End: 2017-12-01

## 2017-12-02 LAB
ALBUMIN SERPL-MCNC: 4.1 G/DL (ref 3.6–4.8)
ALBUMIN/GLOB SERPL: 1.7 {RATIO} (ref 1.2–2.2)
ALP SERPL-CCNC: 60 IU/L (ref 39–117)
ALT SERPL-CCNC: 30 IU/L (ref 0–44)
AST SERPL-CCNC: 20 IU/L (ref 0–40)
BILIRUB SERPL-MCNC: 0.3 MG/DL (ref 0–1.2)
BUN SERPL-MCNC: 30 MG/DL (ref 8–27)
BUN/CREAT SERPL: 18 (ref 10–24)
CALCIUM SERPL-MCNC: 8.9 MG/DL (ref 8.6–10.2)
CHLORIDE SERPL-SCNC: 102 MMOL/L (ref 96–106)
CO2 SERPL-SCNC: 25 MMOL/L (ref 18–29)
CREAT SERPL-MCNC: 1.65 MG/DL (ref 0.76–1.27)
GFR SERPLBLD CREATININE-BSD FMLA CKD-EPI: 43 ML/MIN/1.73
GFR SERPLBLD CREATININE-BSD FMLA CKD-EPI: 50 ML/MIN/1.73
GLOBULIN SER CALC-MCNC: 2.4 G/DL (ref 1.5–4.5)
GLUCOSE SERPL-MCNC: 121 MG/DL (ref 65–99)
POTASSIUM SERPL-SCNC: 4.7 MMOL/L (ref 3.5–5.2)
PROT SERPL-MCNC: 6.5 G/DL (ref 6–8.5)
SODIUM SERPL-SCNC: 141 MMOL/L (ref 134–144)

## 2017-12-04 ENCOUNTER — OFFICE VISIT (OUTPATIENT)
Dept: FAMILY MEDICINE CLINIC | Age: 67
End: 2017-12-04

## 2017-12-04 ENCOUNTER — HOSPITAL ENCOUNTER (OUTPATIENT)
Dept: LAB | Age: 67
Discharge: HOME | End: 2017-12-04
Payer: MEDICARE

## 2017-12-04 ENCOUNTER — TELEPHONE (OUTPATIENT)
Dept: CARDIOLOGY CLINIC | Age: 67
End: 2017-12-04

## 2017-12-04 VITALS
WEIGHT: 180 LBS | HEART RATE: 73 BPM | HEIGHT: 70 IN | DIASTOLIC BLOOD PRESSURE: 77 MMHG | SYSTOLIC BLOOD PRESSURE: 122 MMHG | RESPIRATION RATE: 20 BRPM | OXYGEN SATURATION: 98 % | TEMPERATURE: 97.9 F | BODY MASS INDEX: 25.77 KG/M2

## 2017-12-04 DIAGNOSIS — I50.22 SYSTOLIC CHF, CHRONIC (HCC): ICD-10-CM

## 2017-12-04 DIAGNOSIS — I73.9 PERIPHERAL VASCULAR DISEASE (HCC): ICD-10-CM

## 2017-12-04 DIAGNOSIS — Z95.810 ICD (IMPLANTABLE CARDIOVERTER-DEFIBRILLATOR) IN PLACE: ICD-10-CM

## 2017-12-04 DIAGNOSIS — E78.5 DYSLIPIDEMIA: ICD-10-CM

## 2017-12-04 DIAGNOSIS — N18.9 CHRONIC RENAL IMPAIRMENT, UNSPECIFIED CKD STAGE: ICD-10-CM

## 2017-12-04 DIAGNOSIS — E11.8 TYPE 2 DIABETES MELLITUS WITH COMPLICATION, UNSPECIFIED LONG TERM INSULIN USE STATUS: Primary | ICD-10-CM

## 2017-12-04 DIAGNOSIS — I48.0 PAROXYSMAL ATRIAL FIBRILLATION (HCC): ICD-10-CM

## 2017-12-04 PROCEDURE — 80053 COMPREHEN METABOLIC PANEL: CPT

## 2017-12-04 PROCEDURE — 83036 HEMOGLOBIN GLYCOSYLATED A1C: CPT

## 2017-12-04 PROCEDURE — 80061 LIPID PANEL: CPT

## 2017-12-04 PROCEDURE — 85025 COMPLETE CBC W/AUTO DIFF WBC: CPT

## 2017-12-04 PROCEDURE — 84443 ASSAY THYROID STIM HORMONE: CPT

## 2017-12-04 PROCEDURE — 82043 UR ALBUMIN QUANTITATIVE: CPT

## 2017-12-04 NOTE — LETTER
12/6/2017 3:24 PM 
 
Mr. Radha Dixon  
[address] [address] Dear Hina Rushing.: 
 
Please find your most recent results below. Resulted Orders LIPID PANEL Result Value Ref Range Cholesterol, total 171 100 - 199 mg/dL Triglyceride 165 (H) 0 - 149 mg/dL HDL Cholesterol 37 (L) >39 mg/dL VLDL, calculated 33 5 - 40 mg/dL LDL, calculated 101 (H) 0 - 99 mg/dL Narrative Performed at:  [address]  [id number] : Clara Jim MD, Phone:  [phone] METABOLIC PANEL, COMPREHENSIVE Result Value Ref Range Glucose 111 (H) 65 - 99 mg/dL BUN 28 (H) 8 - 27 mg/dL Creatinine 2.07 (H) 0.76 - 1.27 mg/dL GFR est non-AA 33 (L) >59 mL/min/1.73 GFR est AA 38 (L) >59 mL/min/1.73  
 BUN/Creatinine ratio 14 10 - 24 Sodium 140 134 - 144 mmol/L Potassium 4.9 3.5 - 5.2 mmol/L Chloride 101 96 - 106 mmol/L  
 CO2 25 18 - 29 mmol/L Calcium 9.6 8.6 - 10.2 mg/dL Protein, total 7.0 6.0 - 8.5 g/dL Albumin 4.4 3.6 - 4.8 g/dL GLOBULIN, TOTAL 2.6 1.5 - 4.5 g/dL A-G Ratio 1.7 1.2 - 2.2 Bilirubin, total 0.4 0.0 - 1.2 mg/dL Alk. phosphatase 66 39 - 117 IU/L  
 AST (SGOT) 31 0 - 40 IU/L  
 ALT (SGPT) 35 0 - 44 IU/L Narrative Performed at:  [address]  [id number] : Clara Jim MD, Phone:  [phone] CBC WITH AUTOMATED DIFF Result Value Ref Range WBC 7.5 3.4 - 10.8 x10E3/uL  
 RBC 4.92 4.14 - 5.80 x10E6/uL HGB 13.5 13.0 - 17.7 g/dL Comment: **Please note reference interval change** HCT 39.8 37.5 - 51.0 % MCV 81 79 - 97 fL  
 MCH 27.4 26.6 - 33.0 pg  
 MCHC 33.9 31.5 - 35.7 g/dL  
 RDW 15.4 12.3 - 15.4 % PLATELET 933 773 - 387 x10E3/uL NEUTROPHILS 65 Not Estab. % Lymphocytes 18 Not Estab. % MONOCYTES 12 Not Estab. %  EOSINOPHILS 4 Not Estab. %  
 BASOPHILS 1 Not Estab. %  
 ABS. NEUTROPHILS 4.9 1.4 - 7.0 x10E3/uL Abs Lymphocytes 1.4 0.7 - 3.1 x10E3/uL  
 ABS. MONOCYTES 0.9 0.1 - 0.9 x10E3/uL  
 ABS. EOSINOPHILS 0.3 0.0 - 0.4 x10E3/uL  
 ABS. BASOPHILS 0.1 0.0 - 0.2 x10E3/uL IMMATURE GRANULOCYTES 0 Not Estab. %  
 ABS. IMM. GRANS. 0.0 0.0 - 0.1 x10E3/uL Narrative Performed at:  [address]  [id number] : Reyna Parks MD, Phone:  [phone] TSH 3RD GENERATION Result Value Ref Range TSH 3.040 0.450 - 4.500 uIU/mL Narrative Performed at:  [address]  [id number] : Reyna Parks MD, Phone:  [phone] MICROALBUMIN, UR, RAND W/ MICROALBUMIN/CREA RATIO Result Value Ref Range Creatinine, urine 108.6 Not Estab. mg/dL Microalbumin, urine 115.7 Not Estab. ug/mL Microalb/Creat ratio (ug/mg creat.) 106.5 (H) 0.0 - 30.0 mg/g creat Narrative Performed at:  [address]  [id number] : Reyna Parks MD, Phone:  [phone] HEMOGLOBIN A1C WITH EAG Result Value Ref Range Hemoglobin A1c 6.2 (H) 4.8 - 5.6 % Comment:  
            Pre-diabetes: 5.7 - 6.4 Diabetes: >6.4 Glycemic control for adults with diabetes: <7.0 Estimated average glucose 131 mg/dL Narrative Performed at:  [address]  [id number] : Reyna Parks MD, Phone:  [phone] CKD REPORT Result Value Ref Range Interpretation Note Comment:  
   Supplemental report is available. Narrative Performed at:  [address] [place] [address]  [id number] : Maximiliano Santillan PhD, Phone:  [phone] CVD REPORT Result Value Ref Range INTERPRETATION Note Comment:  
   Supplemental report is available. PDF IMAGE Not applicable Narrative Performed at:  [address] [place] [address]  [id number] : Elvis Mayfield PhD, Phone:  [phone] DIABETES PATIENT EDUCATION Result Value Ref Range PDF Image Not applicable Narrative Performed at:  [address] [place] [address]  [id number] : Elvis Mayfield PhD, Phone:  [phone] RECOMMENDATIONS: 
After reviewing your labs, I believe they are within normal  
limits for your age and let us stay with our current plan of action. In addition, you may receive a Press Ganey Survey from our office.    
Thank you in advance for filling this out for us, and if you cannot  
give a 10 on our ratings, please reach out to us and let us know  
what we can do better for you!  We strive for a ten, and while we  
may not be perfect 100% of the time, we always try our best for  
our patients! Anamaria Avitia M.D. Good Help to Those in Need Please call me if you have any questions: [phone] Sincerely, Marcos Huerta MD

## 2017-12-04 NOTE — PROGRESS NOTES
Patient here for dm, fasting labs. Patient also c/o cough, congestion since Thanksgiving. 1. Have you been to the ER, urgent care clinic since your last visit? Hospitalized since your last visit? No    2. Have you seen or consulted any other health care providers outside of the [address] [place] [address] since your last visit? Include any pap smears or colon screening. No     Mirna Carey.  12/4/2017  Provider:   Danny:  Diabetes Report Card   1) Have you seen the eye doctor in past year?no    2) How would you  rate your Diabetic Diet?good   3) How well do you take care of your feet?well  Numb in spots   4) Do you keep your Primary Care Follow Up Appts? yes    5) Do you know your A1C goal?yes    6) Do you take your medications daily? yes    7) Do you check your blood sugars? yes    8) Have you gained weight?no       9) Do you follow an exercise program?no    10) Can you do better?yes      Lab Results   Component Value Date/Time    Cholesterol, total 120 01/19/2017 03:50 AM    HDL Cholesterol 19 01/19/2017 03:50 AM    LDL, calculated 76 01/19/2017 03:50 AM    Triglyceride 125 01/19/2017 03:50 AM    CHOL/HDL Ratio 6.3 01/19/2017 03:50 AM     Lab Results   Component Value Date/Time    Hemoglobin A1c 13.5 01/19/2017 05:05 PM    Hemoglobin A1c 13.4 01/18/2017 11:11 PM    Glucose 121 12/01/2017 01:49 PM    Glucose (POC) 226 06/13/2017 11:39 AM    LDL, calculated 76 01/19/2017 03:50 AM    Creatinine 1.65 12/01/2017 01:49 PM      No results found for: Kaitlyn Lua, MCA2, MCA3, MCAU   Chief Complaint   Patient presents with    Diabetes    Nasal Congestion     chest congestion, cough since Thanksgiving. he is a 74y.o. year old male who presents for evaluation. See Diabetic Report Card listed above. Patient Active Problem List    Diagnosis    Peripheral vascular disease ([facility]     A. RICKY (3/20/17): Right 0.58, Left 0.56.       Coronary artery disease involving native heart without angina pectoris  Systolic CHF, chronic (HCC)    Type 2 diabetes mellitus with complication (HCC)    Paroxysmal atrial fibrillation (HCC)    H/O: GI bleed    Hepatitis C    Chronic renal insufficiency    Dyslipidemia     A. FLP (1/19/17): Tot 120, , HDL 19, LDL 76 (no Rx).  Ischemic cardiomyopathy     A.  Echo (1/19/17):  EF 5-10% with severe GHK,. Mildly dil LA. Mild TR. PASP 46. B. Cath (1/20/17):  LM ost70. LAD m50. D1 80. LCx d70; OM1 99, OM2 90. RCA p100. No AVG. PAP  53/27/36. C.  PCI (2/9/17): pRCA 2.5x38 Xience + 2.75x12 Xience. ostLM 4.0x12 Xience post-dil with 4.5x12 NC. D.  Echo (2/13/17):  EF 10% with severe GHK, PSM. Dil LA. Mod MR. Mild to mod TR. Left pleural effusion. E.  Echo (5/15/17): EF 25% with severe GHK and basl-mid inf AK, mildly dil LV, DD. Sev dil LA. Mod MR. Mild TR. PASP 35. F.  ICD (6/12/17, Anup): [employer]. Reviewed PmHx, RxHx, FmHx, SocHx, AllgHx--dated and updated in the chart. Review of Systems - negative except as listed above in the HPI    Objective:     Vitals:    12/04/17 0943   BP: 122/77   Pulse: 73   Resp: 20   Temp: 97.9 °F (36.6 °C)   SpO2: 98%   Weight: 180 lb (81.6 kg)   Height: 5' 10\" (1.778 m)     Physical Examination: General appearance - alert, well appearing, and in no distress  Neck - supple, no significant adenopathy  Chest - clear to auscultation, no wheezes, rales or rhonchi, symmetric air entry  Heart - normal rate, regular rhythm, normal S1, S2, no murmurs, rubs, clicks or gallops  Abdomen - soft, nontender, nondistended, no masses or organomegaly  Extremities - peripheral pulses normal, no pedal edema, no clubbing or cyanosis  Foot Exam:  Feet were examined, no sensory or circulatory issues, no ulcers noted  Assessment/ Plan:   Diagnoses and all orders for this visit:    1.  Type 2 diabetes mellitus with complication, unspecified long term insulin use status (HCC)  -     LIPID PANEL  -     METABOLIC PANEL, COMPREHENSIVE  -     CBC WITH AUTOMATED DIFF  -     TSH 3RD GENERATION  -     MICROALBUMIN, UR, RAND W/ MICROALBUMIN/CREA RATIO  -     HEMOGLOBIN A1C WITH EAG  -     REFERRAL TO OPHTHALMOLOGY  -doing well on insulin and sugars 100-120 at home      2. Paroxysmal atrial fibrillation (HCC)  -     TSH 3RD GENERATION  -seeing cardio and no palp    3. Systolic CHF, chronic (HCC)  -     METABOLIC PANEL, COMPREHENSIVE  -no chf sxs and has ICD    4. Peripheral vascular disease (HCC)  -stable    5. Chronic renal impairment, unspecified CKD stage  -     METABOLIC PANEL, COMPREHENSIVE  -stable    6. Dyslipidemia  -     LIPID PANEL  -     METABOLIC PANEL, COMPREHENSIVE       Follow-up Disposition:  Return in about 3 months (around 3/4/2018) for dm. Lab Results   Component Value Date/Time    Cholesterol, total 120 01/19/2017 03:50 AM    HDL Cholesterol 19 01/19/2017 03:50 AM    LDL, calculated 76 01/19/2017 03:50 AM    Triglyceride 125 01/19/2017 03:50 AM    CHOL/HDL Ratio 6.3 01/19/2017 03:50 AM     Lab Results   Component Value Date/Time    Hemoglobin A1c 13.5 01/19/2017 05:05 PM    Hemoglobin A1c 13.4 01/18/2017 11:11 PM    LDL, calculated 76 01/19/2017 03:50 AM    Creatinine 1.65 12/01/2017 01:49 PM          Discussed with patient goal of Diabetes to include:  HgA1C <7, LDL cholesterol <70, Blood pressure <130/80. Discussed with patient diet and weight management and to get regular exercise. Recommend yearly eye exams and daily foot care. The patient understands and agrees with the plan. I have discussed the diagnosis with the patient and the intended plan as seen in the above orders. The patient has received an after-visit summary and questions were answered concerning future plans. Medication Side Effects and Warnings were discussed with patient  Patient Labs were reviewed and or requested  Patient Past Records were reviewed and or requested    Venita Pena M.D.   [address]    There are no Patient Instructions on file for this visit.

## 2017-12-04 NOTE — MR AVS SNAPSHOT
Visit Information Date & Time Provider Department Dept. Phone Encounter #  
 12/4/2017  9:30 AM Ubaldo Tejeda MD [address] [facility] [phone] [id number] Follow-up Instructions Return in about 3 months (around 3/4/2018) for dm. Your Appointments 12/14/2017  1:00 PM  
Follow Up with Marta Denny MD  
[address] [facility] ([facility]) [place] [address]  
  
    
 7/25/2018 10:00 AM  
ESTABLISHED PATIENT with Angela Basurto MD  
[facility] ([facility]) Appt Note: annual  
 [address] [facility]  
[phone]  
  
   
 [address] [facility] [address] [facility] [address]  
  
    
 7/25/2018 10:00 AM  
PACEMAKER with PACEMAKER, STFRANCES  
[facility] ([facility] SCHEDULING) Appt Note: jen sci ICD/stf/study  sherin [address] [facility] [address]  
[phone]  
  
   
 [address] [facility] [address] [address] [address] [facility]  
  
    
  
 12/7/2017  9:15 AM  
REMOTE OFFICE VISIT with Huong Mccloud [facility] ([facility] SCHEDULING) Appt Note: Lat ICD/stf/study [address] [facility] 99 [address]  
[phone]  
  
   
 [address] [facility] [address] [facility] [address] [place] [address]  
  
    
 3/22/2018 10:45 AM  
REMOTE OFFICE VISIT with Huong Mccloud [facility] ([facility] SCH[facility]) Appt Note: Lat ICD/stf/study [address] 1007 [facility]  
[phone] Upcoming Health Maintenance Date Due  
 FOOT EXAM Q1 11/23/1962 MICROALBUMIN Q1 11/23/1962 EYE EXAM RETINAL OR DILATED Q1 11/23/1962 DTaP/Tdap/Td series (1 - Tdap) 11/23/1973 ZOSTER VACCINE AGE 60> 9/23/2012 Influenza Age 5 to Adult 8/1/2017 GLAUCOMA SCREENING Q2Y 11/23/2017 Pneumococcal 65+ Low/Medium Risk (1 of 2 - PCV13) 11/23/2017 MEDICARE YEARLY EXAM 11/23/2017 LIPID PANEL Q1 1/19/2018 FOBT Q 1 YEAR AGE 50-75 2/16/2018 HEMOGLOBIN A1C Q6M 2/17/2018 Allergies as of 12/4/2017  Review Complete On: 12/4/2017 By: Marcos Huerta MD  
  
 Severity Noted Reaction Type Reactions Heparin (Porcine)  02/07/2017    Unknown (comments) Current Immunizations  Reviewed on 12/4/2017 Name Date Influenza Nasal Vaccine 11/15/2017 Pneumococcal Conjugate (PCV-13) 11/15/2017 Reviewed by Ciara Reddy LPN on 57/4/2343 at  9:49 AM  
You Were Diagnosed With   
  
 Codes Comments Type 2 diabetes mellitus with complication, unspecified long term insulin use status (HCC)    -  Primary ICD-10-CM: E11.8 ICD-9-CM: 250.90 Paroxysmal atrial fibrillation (HCC)     ICD-10-CM: I48.0 ICD-9-CM: 427.31 Systolic CHF, chronic (HCC)     ICD-10-CM: I50.22 ICD-9-CM: 428.22, 428.0 Peripheral vascular disease ([facility]     ICD-10-CM: I73.9 ICD-9-CM: 443. 9 Chronic renal impairment, unspecified CKD stage     ICD-10-CM: N18.9 ICD-9-CM: 463. 9 Dyslipidemia     ICD-10-CM: E78.5 ICD-9-CM: 272.4 Vitals BP Pulse Temp Resp Height(growth percentile) Weight(growth percentile) 122/77 73 97.9 °F (36.6 °C) 20 5' 10\" (1.778 m) 180 lb (81.6 kg) SpO2 BMI Smoking Status 98% 25.83 kg/m2 Former Smoker Vitals History BMI and BSA Data Body Mass Index Body Surface Area  
 25.83 kg/m 2 2.01 m 2 Preferred Pharmacy Pharmacy Name Phone [facility] [place], [address] [phone] Your Updated Medication List  
  
   
This list is accurate as of: 12/4/17 10:13 AM.  Always use your most recent med list.  
  
  
  
  
 amiodarone 200 mg tablet Commonly known as:  CORDARONE  
TAKE ONE TABLET BY MOUTH EVERY 12 HOURS  
  
 aspirin delayed-release 81 mg tablet Take 1 Tab by mouth daily. atorvastatin 10 mg tablet Commonly known as:  LIPITOR  
TAKE ONE TABLET BY MOUTH ONCE DAILY  
  
 bumetanide 1 mg tablet Commonly known as:  Angelo Yung Take 1 mg by mouth as needed. carvedilol 3.125 mg tablet Commonly known as:  COREG  
TAKE ONE TABLET BY MOUTH TWICE DAILY WITH MEALS  
  
 clopidogrel 75 mg Tab Commonly known as:  PLAVIX Take 1 Tab by mouth daily. DULoxetine 30 mg capsule Commonly known as:  CYMBALTA Take 1 Cap by mouth daily. insulin NPH/insulin regular 100 unit/mL (70-30) injection Commonly known as:  NovoLIN 70/30  
10 units in am and 8 units in pm  
  
 Insulin Syringes (Disposable) 1 mL Syrg Pt to take 10 units w/ breakfast and 8 units w/ dinner  
  
 patiromer calcium sorbitex 8.4 gram powder Commonly known as:  VELTASSA Take 8.4 g by mouth daily. sacubitril-valsartan  mg tablet Commonly known as:  ENTRESTO Take 1 Tab by mouth two (2) times a day. spironolactone 25 mg tablet Commonly known as:  ALDACTONE Take 1 Tab by mouth daily. TYLENOL EXTRA STRENGTH 500 mg tablet Generic drug:  acetaminophen Take 1,000 mg by mouth every six (6) hours as needed for Pain. Indications: BACK PAIN We Performed the Following CBC WITH AUTOMATED DIFF [60152 CPT(R)] HEMOGLOBIN A1C WITH EAG [79559 CPT(R)] LIPID PANEL [35282 CPT(R)] METABOLIC PANEL, COMPREHENSIVE [73023 CPT(R)] MICROALBUMIN, UR, RAND W/ MICROALBUMIN/CREA RATIO [id number] CPT(R)] REFERRAL TO OPHTHALMOLOGY [REF57 Custom] TSH 3RD GENERATION [75451 CPT(R)] Follow-up Instructions Return in about 3 months (around 3/4/2018) for dm. Referral Information Referral ID Referred By Referred To  
  
 [id number] MICHAEL BALLARD Not Available Visits Status Start Date End Date 1 New Request 12/4/17 12/4/18 If your referral has a status of pending review or denied, additional information will be sent to support the outcome of this decision. Introducing [facility] & HEALTH SERVICES! [employer] introduces [place] [employer] patient portal. Now you can access parts of your medical record, email your doctor's office, and request medication refills online. 1. In your internet browser, go to [email or web address] 2. Click on the First Time User? Click Here link in the Sign In box. You will see the New Member Sign Up page. 3. Enter your [place] [employer] Access Code exactly as it appears below. You will not need to use this code after youve completed the sign-up process. If you do not sign up before the expiration date, you must request a new code. · [place] [employer] Access Code: [email or web address] Expires: 1/21/2018  7:14 AM 
 
4. Enter the last four digits of your Social Security Number (xxxx) and Date of Birth (mm/dd/yyyy) as indicated and click Submit. You will be taken to the next sign-up page. 5. Create a [place] [employer] ID. This will be your [place] [employer] login ID and cannot be changed, so think of one that is secure and easy to remember. 6. Create a [place] [employer] password. You can change your password at any time. 7. Enter your Password Reset Question and Answer. This can be used at a later time if you forget your password. 8. Enter your e-mail address. You will receive e-mail notification when new information is available in [address] 9. Click Sign Up. You can now view and download portions of your medical record. 10. Click the Download Summary menu link to download a portable copy of your medical information. If you have questions, please visit the Frequently Asked Questions section of the [place] [employer] website. Remember, [place] [employer] is NOT to be used for urgent needs. For medical emergencies, dial 911. Now available from your iPhone and Android! Please provide this summary of care documentation to your next provider. Your primary care clinician is listed as MICHAEL BALLARD. If you have any questions after today's visit, please call [phone].

## 2017-12-05 LAB
ALBUMIN SERPL-MCNC: 4.4 G/DL (ref 3.6–4.8)
ALBUMIN/CREAT UR: 106.5 MG/G CREAT (ref 0–30)
ALBUMIN/GLOB SERPL: 1.7 {RATIO} (ref 1.2–2.2)
ALP SERPL-CCNC: 66 IU/L (ref 39–117)
ALT SERPL-CCNC: 35 IU/L (ref 0–44)
AST SERPL-CCNC: 31 IU/L (ref 0–40)
BASOPHILS # BLD AUTO: 0.1 X10E3/UL (ref 0–0.2)
BASOPHILS NFR BLD AUTO: 1 %
BILIRUB SERPL-MCNC: 0.4 MG/DL (ref 0–1.2)
BUN SERPL-MCNC: 28 MG/DL (ref 8–27)
BUN/CREAT SERPL: 14 (ref 10–24)
CALCIUM SERPL-MCNC: 9.6 MG/DL (ref 8.6–10.2)
CHLORIDE SERPL-SCNC: 101 MMOL/L (ref 96–106)
CHOLEST SERPL-MCNC: 171 MG/DL (ref 100–199)
CO2 SERPL-SCNC: 25 MMOL/L (ref 18–29)
CREAT SERPL-MCNC: 2.07 MG/DL (ref 0.76–1.27)
CREAT UR-MCNC: 108.6 MG/DL
EOSINOPHIL # BLD AUTO: 0.3 X10E3/UL (ref 0–0.4)
EOSINOPHIL NFR BLD AUTO: 4 %
ERYTHROCYTE [DISTWIDTH] IN BLOOD BY AUTOMATED COUNT: 15.4 % (ref 12.3–15.4)
EST. AVERAGE GLUCOSE BLD GHB EST-MCNC: 131 MG/DL
GFR SERPLBLD CREATININE-BSD FMLA CKD-EPI: 33 ML/MIN/1.73
GFR SERPLBLD CREATININE-BSD FMLA CKD-EPI: 38 ML/MIN/1.73
GLOBULIN SER CALC-MCNC: 2.6 G/DL (ref 1.5–4.5)
GLUCOSE SERPL-MCNC: 111 MG/DL (ref 65–99)
HBA1C MFR BLD: 6.2 % (ref 4.8–5.6)
HCT VFR BLD AUTO: 39.8 % (ref 37.5–51)
HDLC SERPL-MCNC: 37 MG/DL
HGB BLD-MCNC: 13.5 G/DL (ref 13–17.7)
IMM GRANULOCYTES # BLD: 0 X10E3/UL (ref 0–0.1)
IMM GRANULOCYTES NFR BLD: 0 %
INTERPRETATION, 910389: NORMAL
INTERPRETATION: NORMAL
LDLC SERPL CALC-MCNC: 101 MG/DL (ref 0–99)
LYMPHOCYTES # BLD AUTO: 1.4 X10E3/UL (ref 0.7–3.1)
LYMPHOCYTES NFR BLD AUTO: 18 %
Lab: NORMAL
MCH RBC QN AUTO: 27.4 PG (ref 26.6–33)
MCHC RBC AUTO-ENTMCNC: 33.9 G/DL (ref 31.5–35.7)
MCV RBC AUTO: 81 FL (ref 79–97)
MICROALBUMIN UR-MCNC: 115.7 UG/ML
MONOCYTES # BLD AUTO: 0.9 X10E3/UL (ref 0.1–0.9)
MONOCYTES NFR BLD AUTO: 12 %
NEUTROPHILS # BLD AUTO: 4.9 X10E3/UL (ref 1.4–7)
NEUTROPHILS NFR BLD AUTO: 65 %
PDF IMAGE, 910387: NORMAL
PLATELET # BLD AUTO: 158 X10E3/UL (ref 150–379)
POTASSIUM SERPL-SCNC: 4.9 MMOL/L (ref 3.5–5.2)
PROT SERPL-MCNC: 7 G/DL (ref 6–8.5)
RBC # BLD AUTO: 4.92 X10E6/UL (ref 4.14–5.8)
SODIUM SERPL-SCNC: 140 MMOL/L (ref 134–144)
TRIGL SERPL-MCNC: 165 MG/DL (ref 0–149)
TSH SERPL DL<=0.005 MIU/L-ACNC: 3.04 UIU/ML (ref 0.45–4.5)
VLDLC SERPL CALC-MCNC: 33 MG/DL (ref 5–40)
WBC # BLD AUTO: 7.5 X10E3/UL (ref 3.4–10.8)

## 2017-12-06 ENCOUNTER — TELEPHONE (OUTPATIENT)
Dept: CARDIOLOGY CLINIC | Age: 67
End: 2017-12-06

## 2017-12-06 NOTE — PROGRESS NOTES
After reviewing your labs, I believe they are within normal  limits for your age and let us stay with our current plan of action. In addition, you may receive a [employer] from our office. Thank you in advance for filling this out for us, and if you cannot   give a 10 on our ratings, please reach out to us and let us know  what we can do better for you! We strive for a ten, and while we   may not be perfect 100% of the time, we always try our best for  our patients! Myah Washburn M.D.   Good Help to Those in Need

## 2017-12-06 NOTE — PROGRESS NOTES
A letter was sent to the patient at the address on file, with lab results and Dr. Chrystal Escamilla recommendations for the patient.

## 2017-12-06 NOTE — TELEPHONE ENCOUNTER
Left message for patient to return call regarding his Cymbalta coming to office and for him to . Patient returned call and I advised him that Cymbalta is ready to be picked up. He states he has plenty of cymbalta, he needs Entresto. I gave him number to call for refill from patient assistance.

## 2017-12-07 ENCOUNTER — OFFICE VISIT (OUTPATIENT)
Dept: CARDIOLOGY CLINIC | Age: 67
End: 2017-12-07

## 2017-12-07 ENCOUNTER — DOCUMENTATION ONLY (OUTPATIENT)
Dept: CARDIOLOGY CLINIC | Age: 67
End: 2017-12-07

## 2017-12-07 DIAGNOSIS — Z95.810 CARDIAC DEFIBRILLATOR IN SITU: Primary | ICD-10-CM

## 2017-12-07 NOTE — PROGRESS NOTES
Updated entresto patient assistance prescription for 97/103 entresto faxed to [employer] - signed by Carley Diaz NP.     Charmaine Romero, MSW, LCSW    Clinical    Jennifer Roman [phone]   [employer] ® ACP Facilitator

## 2017-12-13 ENCOUNTER — TELEPHONE (OUTPATIENT)
Dept: CARDIOLOGY CLINIC | Age: 67
End: 2017-12-13

## 2017-12-13 NOTE — TELEPHONE ENCOUNTER
Telephone Call RE:  Appointment reminder     Outcome:     [] Patient confirmed appointment   [] Patient rescheduled appointment for    [] Unable to reach   [x] Left message              [] Other:       Oneal Anders

## 2017-12-14 ENCOUNTER — OFFICE VISIT (OUTPATIENT)
Dept: CARDIOLOGY CLINIC | Age: 67
End: 2017-12-14

## 2017-12-14 VITALS
BODY MASS INDEX: 25.88 KG/M2 | HEART RATE: 69 BPM | OXYGEN SATURATION: 96 % | RESPIRATION RATE: 20 BRPM | SYSTOLIC BLOOD PRESSURE: 150 MMHG | WEIGHT: 180.8 LBS | DIASTOLIC BLOOD PRESSURE: 82 MMHG | HEIGHT: 70 IN | TEMPERATURE: 97.8 F

## 2017-12-14 DIAGNOSIS — I25.5 ISCHEMIC CARDIOMYOPATHY: Primary | ICD-10-CM

## 2017-12-14 DIAGNOSIS — I50.22 SYSTOLIC CHF, CHRONIC (HCC): ICD-10-CM

## 2017-12-14 DIAGNOSIS — N18.9 CHRONIC RENAL IMPAIRMENT, UNSPECIFIED CKD STAGE: ICD-10-CM

## 2017-12-14 DIAGNOSIS — E78.5 DYSLIPIDEMIA: ICD-10-CM

## 2017-12-14 DIAGNOSIS — I48.0 PAROXYSMAL ATRIAL FIBRILLATION (HCC): ICD-10-CM

## 2017-12-14 NOTE — LETTER
12/14/2017 3:31 PM 
 
Patient:  Preet Ernandez. YOB: 1950 Date of Visit: 12/14/2017 Dear Jose Dior MD 
[address] [address] [address] [address] VIA In Basket Narciso Lyons MD 
[address] [facility] [address] [facility] [address] [facility] [address] VIA In Basket 
 : Thank you for referring Mr. Paco [place] to me for evaluation/treatment. Below are the relevant portions of my assessment and plan of care. [facility] Progress Note DOS:  12/14/2017 REFERRING PROVIDER:  Narciso Lyons MD 
PRIMARY CARE PHYSICIAN: Jose Dior MD 
 
 
HPI: 74y.o. year old male with a history of chronic systolic heart failure secondary to ICM. His history is also pertinent for CAD s/p PCI of LM,  of RCA, PAF s/p ICD, Hep C, past GI bleed, and tobacco abuse. He had been on home milrinone infusion through a PICC and has been slowly being weaned from this. He was last seen in our clinic 2 weeks ago and after reviewing labs, had the milrinone stopped and the PICC lien removed by home health on 10/11/17. He presents to clinic today for follow up of his heart failure. Since his last visit, he has been doing well, no acute complaints, doing his usual activities, follow a low sodium diet as best he can. Stopped taking both Entresto and Valtessa when he ran out 2 weeks ago but hasn't been able to call the company for a new shipment. He has been off on milrinone for 10 weeks, states he has more energy, denies dizziness or lightheadedness. Chief Complaint Patient presents with  Follow-up  Headache ROS:   
General:  negative HEENT: negative Pulmonary: no cough, shortness of breath, or wheezing Cardiac: negative GI:  Negative Musculo: negative Neuro: negative Skin:  negative History: 
Past Medical History:  
Diagnosis Date  Cardiomyopathy ([address] 1/20/2017 A. Echo (1/19/17):  EF 5-10% with severe GHK,. Mildly dil LA. Mild TR. PASP 46.  Chronic renal insufficiency 3/6/2017  Diabetes mellitus ([facility] 3/6/2017  Dyslipidemia 3/6/2017 A. FLP (1/19/17): Tot 120, , HDL 19, LDL 76 (no Rx).  H/O: GI bleed 3/6/2017  Hepatitis C 3/6/2017  Paroxysmal atrial fibrillation ([facility] 3/6/2017  Peripheral vascular disease ([facility] 9/18/2017 A. RICKY (3/20/17): Right 0.58, Left 0.56. Past Surgical History:  
Procedure Laterality Date  COLONOSCOPY N/A 2/17/2017 COLONOSCOPY performed by Princess Andre. Gloria Miller MD at [facility] ENDOSCOPY  
 HX COLONOSCOPY [facility] 2/2017 Social History Social History  Marital status:  Spouse name: N/A  
 Number of children: N/A  
 Years of education: N/A Occupational History  Not on file. Social History Main Topics  Smoking status: Former Smoker Packs/day: 2.00 Years: 45.00 Quit date: 1/20/2017  Smokeless tobacco: Never Used  Alcohol use No  
 Drug use: No  
 Sexual activity: No  
 
Other Topics Concern  Not on file Social History Narrative Family History Problem Relation Age of Onset  No Known Problems Mother  Cancer Father  Colon Cancer Father Current Medications:  
Current Outpatient Prescriptions Medication Sig Dispense Refill  insulin NPH/insulin regular (NOVOLIN 70/30) 100 unit/mL (70-30) injection 10 units in am and 8 units in pm 10 mL 5  
 spironolactone (ALDACTONE) 25 mg tablet Take 1 Tab by mouth daily. 90 Tab 1  clopidogrel (PLAVIX) 75 mg tab Take 1 Tab by mouth daily. 30 Tab 4  
 amiodarone (CORDARONE) 200 mg tablet TAKE ONE TABLET BY MOUTH EVERY 12 HOURS 60 Tab 4  
 atorvastatin (LIPITOR) 10 mg tablet TAKE ONE TABLET BY MOUTH ONCE DAILY 30 Tab 4  carvedilol (COREG) 3.125 mg tablet TAKE ONE TABLET BY MOUTH TWICE DAILY WITH MEALS 60 Tab 4  
 bumetanide (BUMEX) 1 mg tablet Take 1 mg by mouth as needed.     
 acetaminophen (TYLENOL EXTRA STRENGTH) 500 mg tablet Take 1,000 mg by mouth every six (6) hours as needed for Pain. Indications: BACK PAIN    
 DULoxetine (CYMBALTA) 30 mg capsule Take 1 Cap by mouth daily. 30 Cap 5  
 aspirin delayed-release 81 mg tablet Take 1 Tab by mouth daily. 30 Tab 1  
 Insulin Syringes, Disposable, 1 mL syrg Pt to take 10 units w/ breakfast and 8 units w/ dinner 500 Syringe 1  
 sacubitril-valsartan (ENTRESTO) 97 mg/103 mg tablet Take 1 Tab by mouth two (2) times a day. 180 Tab 4  patiromer calcium sorbitex (VELTASSA) 8.4 gram powder Take 8.4 g by mouth daily. 4 Packet 0 Allergies: Allergies Allergen Reactions  Heparin (Porcine) Unknown (comments) Physical Exam:  
Physical Exam  
Constitutional: He is oriented to person, place, and time. He appears well-developed and well-nourished. No distress. HENT:  
Head: Normocephalic and atraumatic. Neck: Neck supple. No JVD present. Cardiovascular: Normal rate, regular rhythm, normal heart sounds and intact distal pulses. No murmur heard. Pulmonary/Chest: Effort normal and breath sounds normal. No respiratory distress. Abdominal: Soft. He exhibits no distension. There is no tenderness. Neurological: He is alert and oriented to person, place, and time. Skin: Skin is warm and dry. Psychiatric: He has a normal mood and affect. Vitals:   
Visit Vitals  /82 (BP 1 Location: Left arm, BP Patient Position: Sitting)  Pulse 69  Temp 97.8 °F (36.6 °C) (Oral)  Resp 20  
 Ht 5' 10\" (1.778 m)  Wt 180 lb 12.8 oz (82 kg)  SpO2 96%  BMI 25.94 kg/m2 Recent Labs:  
Labs Latest Ref Rng & Units 12/4/2017 12/1/2017 11/16/2017 10/19/2017 WBC 3.4 - 10.8 x10E3/uL 7.5 - - -  
RBC 4.14 - 5.80 x10E6/uL 4.92 - - - Hemoglobin 13.0 - 17.7 g/dL 13.5 - - - Hematocrit 37.5 - 51.0 % 39.8 - - -  
MCV 79 - 97 fL 81 - - - Platelets 685 - 189 U09J7/ - - - Monocytes Not Estab. % 12 - - - Eosinophils Not Estab. % 4 - - - Basophils Not Estab. % 1 - - -  
 Albumin 3.6 - 4.8 g/dL 4.4 4.1 4.5 - Calcium 8.6 - 10.2 mg/dL 9.6 8.9 9.1 9.7 SGOT 0 - 40 IU/L 31 20 24 - Glucose 65 - 99 mg/dL 111(H) 121(H) 79 160(H) BUN 8 - 27 mg/dL 28(H) 30(H) 42(H) 30(H) Creatinine 0.76 - 1.27 mg/dL 2.07(H) 1.65(H) 1.75(H) 1.47(H) Sodium 134 - 144 mmol/L 140 141 144 142 Potassium 3.5 - 5.2 mmol/L 4.9 4.7 5.4(H) 4.3 TSH 0.450 - 4.500 uIU/mL 3.040 - - - Some recent data might be hidden Impression / Plan: 
Chronic systolic heart failure (EF 25%), d/t ICM, ACC/AHA Stage D, NYHA class II Off milrinone now about 10 weeks Currently denies any heart failure symptoms Continue Coreg and Spironolactone Cont Entresto to 97/103mg - monitor renal function closely Labs in 3 weeks Will resume Veltassa Continue Bumex PRN - last used months ago Continue daily weights, sodium and fluid restriction Follow up in 1 month 
   
CAD s/p PCI Continue ASA, Plavix, Coreg and statin per cardiology  
   
CKD Last Creatinine slightly increased at 2.0 Has not been taking diuretic Will recheck in 2 weeks Monitor closely with Kristofer Steven 
PAF Rate controlled Managed per cardiology Continue amiodarone and Coreg  
   
H/O lower GIB Still has colonic polyp and AVM which will require treatment at some point once cardiac situation is stabilized likely Jan/Feb, will need to hold Plavix  
   
Diabetes Insulin management by PCP Timmy Pandya, NP Jennifer Anderson Roman [address] [facility] [address] [phone] 
40 hour VAD/HF Pager: [phone] If you have questions, please do not hesitate to call me. I look forward to following Mr. Colletta Britany along with you. Sincerely, Michelle Putnam MD

## 2017-12-14 NOTE — MR AVS SNAPSHOT
Visit Information Date & Time Provider Department Dept. Phone Encounter #  
 12/14/2017  1:00 PM Sky Zelaya MD [address] [id number] Your Appointments 3/5/2018  9:30 AM  
ESTABLISHED PATIENT with Alexi Montgomery MD  
[address] [facility] [address]) Appt Note: 3mo fuv  
 [address] [address]  
[phone]  
  
   
 [address] [address]  
  
    
 7/25/2018 10:00 AM  
ESTABLISHED PATIENT with Gunnar Solis MD  
[facility] ([facility]) Appt Note: annual  
 [address] [facility]  
[phone]  
  
   
 [address] [facility] [address] [facility] [address] [place] [address]  
  
    
 7/25/2018 10:00 AM  
PACEMAKER with PACEMAKER, TAYANCES  
[facility] ([facility] SCHEDUL[facility]) Appt Note: jen sci ICD/stf/study  sherin [address] [facility] [address] [facility] 99 [address]  
[phone]  
  
   
 [address] [place] [address]  
  
    
  
 3/22/2018 10:45 AM  
REMOTE OFFICE VISIT with Prasanth Davalos [facility] ([facility] SCHEDULING) Appt Note: Lat ICD/stf/study [address] [facility] [address] 100[address] [facility]  
[address] [facility] [address] [address] [address] Upcoming Health Maintenance Date Due  
 FOOT EXAM Q1 11/23/1962 EYE EXAM RETINAL OR DILATED Q1 11/23/1962 DTaP/Tdap/Td series (1 - Tdap) 11/23/1973 ZOSTER VACCINE AGE 60> 9/23/2012 GLAUCOMA SCREENING Q2Y 11/23/2017 MEDICARE YEARLY EXAM 11/23/2017 FOBT Q 1 YEAR AGE 50-75 2/16/2018 HEMOGLOBIN A1C Q6M 6/4/2018 Pneumococcal 65+ Low/Medium Risk (2 of 2 - PPSV23) 11/15/2018 MICROALBUMIN Q1 12/4/2018 LIPID PANEL Q1 12/4/2018 Allergies as of 12/14/2017  Review Complete On: 12/14/2017 By: Jerry Villalpando sacubitril-valsartan  mg tablet Commonly known as:  ENTRESTO Take 1 Tab by mouth two (2) times a day. spironolactone 25 mg tablet Commonly known as:  ALDACTONE Take 1 Tab by mouth daily. TYLENOL EXTRA STRENGTH 500 mg tablet Generic drug:  acetaminophen Take 1,000 mg by mouth every six (6) hours as needed for Pain. Indications: BACK PAIN Patient Instructions Please call GI specialists to schedule follow colonoscopy Labs in 3 weeks Follow up in 1 month Please resume your Harmeet Mclean today and call to get your next shipment Please resume your Priti Neff today Introducing [facility] & HEALTH SERVICES! [address] introduces [employer] patient portal. Now you can access parts of your medical record, email your doctor's office, and request medication refills online. 1. In your internet browser, go to [email or web address] 2. Click on the First Time User? Click Here link in the Sign In box. You will see the New Member Sign Up page. 3. Enter your [employer] Access Code exactly as it appears below. You will not need to use this code after youve completed the sign-up process. If you do not sign up before the expiration date, you must request a new code. · [employer] Access Code: [email or web address] Expires: 1/21/2018  7:14 AM 
 
4. Enter the last four digits of your Social Security Number (xxxx) and Date of Birth (mm/dd/yyyy) as indicated and click Submit. You will be taken to the next sign-up page. 5. Create a [email or web address] ID. This will be your [employer] login ID and cannot be changed, so think of one that is secure and easy to remember. 6. Create a [employer] password. You can change your password at any time. 7. Enter your Password Reset Question and Answer. This can be used at a later time if you forget your password. 8. Enter your e-mail address. You will receive e-mail notification when new information is available in [address] 9. Click Sign Up. You can now view and download portions of your medical record. 10. Click the Download Summary menu link to download a portable copy of your medical information. If you have questions, please visit the Frequently Asked Questions section of the [employer] website. Remember, [employer] is NOT to be used for urgent needs. For medical emergencies, dial 911. Now available from your iPhone and Android! Please provide this summary of care documentation to your next provider. Your primary care clinician is listed as MICHAEL BALLARD. If you have any questions after today's visit, please call [phone].

## 2017-12-14 NOTE — PATIENT INSTRUCTIONS
Please call GI specialists to schedule follow colonoscopy    Labs in 3 weeks    Follow up in 1 month    Please resume your Entresto today and call to get your next shipment    Please resume your Melvin Hasting today

## 2017-12-14 NOTE — COMMUNICATION BODY
[facility] Progress Note      DOS:  12/14/2017  REFERRING PROVIDER:  Deedee Carrillo MD  PRIMARY CARE PHYSICIAN: Carolyn Lind MD      HPI: 74y.o. year old male with a history of chronic systolic heart failure secondary to ICM. His history is also pertinent for CAD s/p PCI of LM,  of RCA, PAF s/p ICD, Hep C, past GI bleed, and tobacco abuse. He had been on home milrinone infusion through a PICC and has been slowly being weaned from this. He was last seen in our clinic 2 weeks ago and after reviewing labs, had the milrinone stopped and the PICC lien removed by home health on 10/11/17. He presents to clinic today for follow up of his heart failure. Since his last visit, he has been doing well, no acute complaints, doing his usual activities, follow a low sodium diet as best he can. Stopped taking both Entresto and Valtessa when he ran out 2 weeks ago but hasn't been able to call the company for a new shipment. He has been off on milrinone for 10 weeks, states he has more energy, denies dizziness or lightheadedness. Chief Complaint   Patient presents with    Follow-up    Headache       ROS:    General:  negative  HEENT: negative  Pulmonary: no cough, shortness of breath, or wheezing  Cardiac: negative   GI:  Negative   Musculo: negative  Neuro: negative  Skin:  negative      History:  Past Medical History:   Diagnosis Date    Cardiomyopathy ([facility] [address] 1/20/2017    A. Echo (1/19/17):  EF 5-10% with severe GHK,. Mildly dil LA. Mild TR. PASP 46.    Chronic renal insufficiency 3/6/2017    Diabetes mellitus ([address] 3/6/2017    Dyslipidemia 3/6/2017    A. FLP (1/19/17): Tot 120, , HDL 19, LDL 76 (no Rx).  H/O: GI bleed 3/6/2017    Hepatitis C 3/6/2017    Paroxysmal atrial fibrillation ([address] 3/6/2017    Peripheral vascular disease ([facility] [address] 9/18/2017    A. RICKY (3/20/17): Right 0.58, Left 0.56.      Past Surgical History:   Procedure Laterality Date    COLONOSCOPY N/A 2/17/2017    COLONOSCOPY performed by Omi Mcclelland. Leona White MD at [facility] ENDOSCOPY   Kiah Booth COLONOSCOPY      [facility] 2/2017     Social History     Social History    Marital status:      Spouse name: N/A    Number of children: N/A    Years of education: N/A     Occupational History    Not on file. Social History Main Topics    Smoking status: Former Smoker     Packs/day: 2.00     Years: 45.00     Quit date: 1/20/2017    Smokeless tobacco: Never Used    Alcohol use No    Drug use: No    Sexual activity: No     Other Topics Concern    Not on file     Social History Narrative     Family History   Problem Relation Age of Onset    No Known Problems Mother     Cancer Father     Colon Cancer Father        Current Medications:   Current Outpatient Prescriptions   Medication Sig Dispense Refill    insulin NPH/insulin regular (NOVOLIN 70/30) 100 unit/mL (70-30) injection 10 units in am and 8 units in pm 10 mL 5    spironolactone (ALDACTONE) 25 mg tablet Take 1 Tab by mouth daily. 90 Tab 1    clopidogrel (PLAVIX) 75 mg tab Take 1 Tab by mouth daily. 30 Tab 4    amiodarone (CORDARONE) 200 mg tablet TAKE ONE TABLET BY MOUTH EVERY 12 HOURS 60 Tab 4    atorvastatin (LIPITOR) 10 mg tablet TAKE ONE TABLET BY MOUTH ONCE DAILY 30 Tab 4    carvedilol (COREG) 3.125 mg tablet TAKE ONE TABLET BY MOUTH TWICE DAILY WITH MEALS 60 Tab 4    bumetanide (BUMEX) 1 mg tablet Take 1 mg by mouth as needed.  acetaminophen (TYLENOL EXTRA STRENGTH) 500 mg tablet Take 1,000 mg by mouth every six (6) hours as needed for Pain. Indications: BACK PAIN      DULoxetine (CYMBALTA) 30 mg capsule Take 1 Cap by mouth daily. 30 Cap 5    aspirin delayed-release 81 mg tablet Take 1 Tab by mouth daily. 30 Tab 1    Insulin Syringes, Disposable, 1 mL syrg Pt to take 10 units w/ breakfast and 8 units w/ dinner 500 Syringe 1    sacubitril-valsartan (ENTRESTO) 97 mg/103 mg tablet Take 1 Tab by mouth two (2) times a day.  180 Tab 4    patiromer calcium sorbitex (VELTASSA) 8.4 gram powder Take 8.4 g by mouth daily. 4 Packet 0       Allergies: Allergies   Allergen Reactions    Heparin (Porcine) Unknown (comments)       Physical Exam:   Physical Exam   Constitutional: He is oriented to person, place, and time. He appears well-developed and well-nourished. No distress. HENT:   Head: Normocephalic and atraumatic. Neck: Neck supple. No JVD present. Cardiovascular: Normal rate, regular rhythm, normal heart sounds and intact distal pulses. No murmur heard. Pulmonary/Chest: Effort normal and breath sounds normal. No respiratory distress. Abdominal: Soft. He exhibits no distension. There is no tenderness. Neurological: He is alert and oriented to person, place, and time. Skin: Skin is warm and dry. Psychiatric: He has a normal mood and affect.        Vitals:    Visit Vitals    /82 (BP 1 Location: Left arm, BP Patient Position: Sitting)    Pulse 69    Temp 97.8 °F (36.6 °C) (Oral)    Resp 20    Ht 5' 10\" (1.778 m)    Wt 180 lb 12.8 oz (82 kg)    SpO2 96%    BMI 25.94 kg/m2           Recent Labs:   Labs Latest Ref Rng & Units 12/4/2017 12/1/2017 11/16/2017 10/19/2017   WBC 3.4 - 10.8 x10E3/uL 7.5 - - -   RBC 4.14 - 5.80 x10E6/uL 4.92 - - -   Hemoglobin 13.0 - 17.7 g/dL 13.5 - - -   Hematocrit 37.5 - 51.0 % 39.8 - - -   MCV 79 - 97 fL 81 - - -   Platelets 713 - 938 Q76J0/ - - -   Monocytes Not Estab. % 12 - - -   Eosinophils Not Estab. % 4 - - -   Basophils Not Estab. % 1 - - -   Albumin 3.6 - 4.8 g/dL 4.4 4.1 4.5 -   Calcium 8.6 - 10.2 mg/dL 9.6 8.9 9.1 9.7   SGOT 0 - 40 IU/L 31 20 24 -   Glucose 65 - 99 mg/dL 111(H) 121(H) 79 160(H)   BUN 8 - 27 mg/dL 28(H) 30(H) 42(H) 30(H)   Creatinine 0.76 - 1.27 mg/dL 2.07(H) 1.65(H) 1.75(H) 1.47(H)   Sodium 134 - 144 mmol/L 140 141 144 142   Potassium 3.5 - 5.2 mmol/L 4.9 4.7 5.4(H) 4.3   TSH 0.450 - 4.500 uIU/mL 3.040 - - -   Some recent data might be hidden         Impression / Plan:  Chronic systolic heart failure (EF 25%), d/t ICM, ACC/AHA Stage D, NYHA class II  Off milrinone now about 10 weeks  Currently denies any heart failure symptoms  Continue Coreg and Spironolactone   Cont Entresto to 97/103mg - monitor renal function closely  Labs in 3 weeks   Will resume Veltassa  Continue Bumex PRN - last used months ago  Continue daily weights, sodium and fluid restriction  Follow up in 1 month      CAD s/p PCI  Continue ASA, Plavix, Coreg and statin per cardiology       CKD  Last Creatinine slightly increased at 2.0  Has not been taking diuretic  Will recheck in 2 weeks  Monitor closely with Entresto      PAF  Rate controlled  Managed per cardiology  Continue amiodarone and Coreg       H/O lower GIB  Still has colonic polyp and AVM which will require treatment at some point once cardiac situation is stabilized likely Jan/Feb, will need to hold Plavix       Diabetes  Insulin management by PCP      Riccardo Cockayne, NP [address]  [address] [phone]  24 hour VAD/HF Pager: [phone]

## 2017-12-14 NOTE — PROGRESS NOTES
[facility] Progress Note      DOS:  12/14/2017  REFERRING PROVIDER:  Sofie Elias MD  PRIMARY CARE PHYSICIAN: Mary Hawkins MD      HPI: 74y.o. year old male with a history of chronic systolic heart failure secondary to ICM. His history is also pertinent for CAD s/p PCI of LM,  of RCA, PAF s/p ICD, Hep C, past GI bleed, and tobacco abuse. He had been on home milrinone infusion through a PICC and has been slowly being weaned from this. He was last seen in our clinic 2 weeks ago and after reviewing labs, had the milrinone stopped and the PICC lien removed by home health on 10/11/17. He presents to clinic today for follow up of his heart failure. Since his last visit, he has been doing well, no acute complaints, doing his usual activities, follow a low sodium diet as best he can. Stopped taking both Entresto and Valtessa when he ran out 2 weeks ago but hasn't been able to call the company for a new shipment. He has been off on milrinone for 10 weeks, states he has more energy, denies dizziness or lightheadedness. Chief Complaint   Patient presents with    Follow-up    Headache       ROS:    General:  negative  HEENT: negative  Pulmonary: no cough, shortness of breath, or wheezing  Cardiac: negative   GI:  Negative   Musculo: negative  Neuro: negative  Skin:  negative      History:  Past Medical History:   Diagnosis Date    Cardiomyopathy ([facility] [address] 1/20/2017    A. Echo (1/19/17):  EF 5-10% with severe GHK,. Mildly dil LA. Mild TR. PASP 46.    Chronic renal insufficiency 3/6/2017    Diabetes mellitus ([address] 3/6/2017    Dyslipidemia 3/6/2017    A. FLP (1/19/17): Tot 120, , HDL 19, LDL 76 (no Rx).  H/O: GI bleed 3/6/2017    Hepatitis C 3/6/2017    Paroxysmal atrial fibrillation ([address] 3/6/2017    Peripheral vascular disease ([facility] [address] 9/18/2017    A. RICKY (3/20/17): Right 0.58, Left 0.56.      Past Surgical History:   Procedure Laterality Date    COLONOSCOPY N/A 2/17/2017    COLONOSCOPY performed by Osvaldo Cerda. Coco Shaw MD at [facility] ENDOSCOPY   [facility] COLONOSCOPY      [facility] 2/2017     Social History     Social History    Marital status:      Spouse name: N/A    Number of children: N/A    Years of education: N/A     Occupational History    Not on file. Social History Main Topics    Smoking status: Former Smoker     Packs/day: 2.00     Years: 45.00     Quit date: 1/20/2017    Smokeless tobacco: Never Used    Alcohol use No    Drug use: No    Sexual activity: No     Other Topics Concern    Not on file     Social History Narrative     Family History   Problem Relation Age of Onset    No Known Problems Mother     Cancer Father     Colon Cancer Father        Current Medications:   Current Outpatient Prescriptions   Medication Sig Dispense Refill    insulin NPH/insulin regular (NOVOLIN 70/30) 100 unit/mL (70-30) injection 10 units in am and 8 units in pm 10 mL 5    spironolactone (ALDACTONE) 25 mg tablet Take 1 Tab by mouth daily. 90 Tab 1    clopidogrel (PLAVIX) 75 mg tab Take 1 Tab by mouth daily. 30 Tab 4    amiodarone (CORDARONE) 200 mg tablet TAKE ONE TABLET BY MOUTH EVERY 12 HOURS 60 Tab 4    atorvastatin (LIPITOR) 10 mg tablet TAKE ONE TABLET BY MOUTH ONCE DAILY 30 Tab 4    carvedilol (COREG) 3.125 mg tablet TAKE ONE TABLET BY MOUTH TWICE DAILY WITH MEALS 60 Tab 4    bumetanide (BUMEX) 1 mg tablet Take 1 mg by mouth as needed.  acetaminophen (TYLENOL EXTRA STRENGTH) 500 mg tablet Take 1,000 mg by mouth every six (6) hours as needed for Pain. Indications: BACK PAIN      DULoxetine (CYMBALTA) 30 mg capsule Take 1 Cap by mouth daily. 30 Cap 5    aspirin delayed-release 81 mg tablet Take 1 Tab by mouth daily. 30 Tab 1    Insulin Syringes, Disposable, 1 mL syrg Pt to take 10 units w/ breakfast and 8 units w/ dinner 500 Syringe 1    sacubitril-valsartan (ENTRESTO) 97 mg/103 mg tablet Take 1 Tab by mouth two (2) times a day.  180 Tab 4    patiromer calcium sorbitex (VELTASSA) 8.4 gram powder Take 8.4 g by mouth daily. 4 Packet 0       Allergies: Allergies   Allergen Reactions    Heparin (Porcine) Unknown (comments)       Physical Exam:   Physical Exam   Constitutional: He is oriented to person, place, and time. He appears well-developed and well-nourished. No distress. HENT:   Head: Normocephalic and atraumatic. Neck: Neck supple. No JVD present. Cardiovascular: Normal rate, regular rhythm, normal heart sounds and intact distal pulses. No murmur heard. Pulmonary/Chest: Effort normal and breath sounds normal. No respiratory distress. Abdominal: Soft. He exhibits no distension. There is no tenderness. Neurological: He is alert and oriented to person, place, and time. Skin: Skin is warm and dry. Psychiatric: He has a normal mood and affect.        Vitals:    Visit Vitals    /82 (BP 1 Location: Left arm, BP Patient Position: Sitting)    Pulse 69    Temp 97.8 °F (36.6 °C) (Oral)    Resp 20    Ht 5' 10\" (1.778 m)    Wt 180 lb 12.8 oz (82 kg)    SpO2 96%    BMI 25.94 kg/m2           Recent Labs:   Labs Latest Ref Rng & Units 12/4/2017 12/1/2017 11/16/2017 10/19/2017   WBC 3.4 - 10.8 x10E3/uL 7.5 - - -   RBC 4.14 - 5.80 x10E6/uL 4.92 - - -   Hemoglobin 13.0 - 17.7 g/dL 13.5 - - -   Hematocrit 37.5 - 51.0 % 39.8 - - -   MCV 79 - 97 fL 81 - - -   Platelets 872 - 280 T12H9/ - - -   Monocytes Not Estab. % 12 - - -   Eosinophils Not Estab. % 4 - - -   Basophils Not Estab. % 1 - - -   Albumin 3.6 - 4.8 g/dL 4.4 4.1 4.5 -   Calcium 8.6 - 10.2 mg/dL 9.6 8.9 9.1 9.7   SGOT 0 - 40 IU/L 31 20 24 -   Glucose 65 - 99 mg/dL 111(H) 121(H) 79 160(H)   BUN 8 - 27 mg/dL 28(H) 30(H) 42(H) 30(H)   Creatinine 0.76 - 1.27 mg/dL 2.07(H) 1.65(H) 1.75(H) 1.47(H)   Sodium 134 - 144 mmol/L 140 141 144 142   Potassium 3.5 - 5.2 mmol/L 4.9 4.7 5.4(H) 4.3   TSH 0.450 - 4.500 uIU/mL 3.040 - - -   Some recent data might be hidden         Impression / Plan:  Chronic systolic heart failure (EF 25%), d/t ICM, ACC/AHA Stage D, NYHA class II  Off milrinone now about 10 weeks  Currently denies any heart failure symptoms  Continue Coreg and Spironolactone   Cont Entresto to 97/103mg - monitor renal function closely  Labs in 3 weeks   Will resume Veltassa  Continue Bumex PRN - last used months ago  Continue daily weights, sodium and fluid restriction  Follow up in 1 month      CAD s/p PCI  Continue ASA, Plavix, Coreg and statin per cardiology       CKD  Last Creatinine slightly increased at 2.0  Has not been taking diuretic  Will recheck in 2 weeks  Monitor closely with Entresto      PAF  Rate controlled  Managed per cardiology  Continue amiodarone and Coreg       H/O lower GIB  Still has colonic polyp and AVM which will require treatment at some point once cardiac situation is stabilized likely Jan/Feb, will need to hold Plavix       Diabetes  Insulin management by PCP      MONICA Cornelius [address] [place] [address]   24 hour VAD/HF Pager: [phone]

## 2017-12-29 DIAGNOSIS — N18.9 CHRONIC RENAL IMPAIRMENT, UNSPECIFIED CKD STAGE: ICD-10-CM

## 2017-12-29 DIAGNOSIS — Z87.19 H/O: GI BLEED: ICD-10-CM

## 2017-12-29 DIAGNOSIS — I50.22 SYSTOLIC CHF, CHRONIC (HCC): Primary | ICD-10-CM

## 2018-01-02 ENCOUNTER — TELEPHONE (OUTPATIENT)
Dept: CARDIOLOGY CLINIC | Age: 68
End: 2018-01-02

## 2018-01-03 LAB
ALBUMIN SERPL-MCNC: 4.2 G/DL (ref 3.6–4.8)
ALBUMIN/GLOB SERPL: 1.7 {RATIO} (ref 1.2–2.2)
ALP SERPL-CCNC: 61 IU/L (ref 39–117)
ALT SERPL-CCNC: 26 IU/L (ref 0–44)
AST SERPL-CCNC: 20 IU/L (ref 0–40)
BILIRUB SERPL-MCNC: 0.2 MG/DL (ref 0–1.2)
BUN SERPL-MCNC: 38 MG/DL (ref 8–27)
BUN/CREAT SERPL: 21 (ref 10–24)
CALCIUM SERPL-MCNC: 9.1 MG/DL (ref 8.6–10.2)
CHLORIDE SERPL-SCNC: 104 MMOL/L (ref 96–106)
CO2 SERPL-SCNC: 23 MMOL/L (ref 18–29)
CREAT SERPL-MCNC: 1.77 MG/DL (ref 0.76–1.27)
GLOBULIN SER CALC-MCNC: 2.5 G/DL (ref 1.5–4.5)
GLUCOSE SERPL-MCNC: 130 MG/DL (ref 65–99)
POTASSIUM SERPL-SCNC: 5.2 MMOL/L (ref 3.5–5.2)
PROT SERPL-MCNC: 6.7 G/DL (ref 6–8.5)
SODIUM SERPL-SCNC: 139 MMOL/L (ref 134–144)
SPECIMEN STATUS REPORT, ROLRST: NORMAL

## 2018-01-04 ENCOUNTER — TELEPHONE (OUTPATIENT)
Dept: CARDIOLOGY CLINIC | Age: 68
End: 2018-01-04

## 2018-01-04 NOTE — TELEPHONE ENCOUNTER
I called patient and reviewed lab results with him. He states understanding and has no further questions.

## 2018-01-09 RX ORDER — ATORVASTATIN CALCIUM 10 MG/1
TABLET, FILM COATED ORAL
Qty: 30 TAB | Refills: 4 | Status: SHIPPED | OUTPATIENT
Start: 2018-01-09 | End: 2018-04-17 | Stop reason: SDUPTHER

## 2018-01-09 RX ORDER — CARVEDILOL 3.12 MG/1
TABLET ORAL
Qty: 60 TAB | Refills: 4 | Status: SHIPPED | OUTPATIENT
Start: 2018-01-09 | End: 2018-05-23 | Stop reason: CLARIF

## 2018-01-09 RX ORDER — CLOPIDOGREL BISULFATE 75 MG/1
75 TABLET ORAL DAILY
Qty: 30 TAB | Refills: 4 | Status: SHIPPED | OUTPATIENT
Start: 2018-01-09 | End: 2018-03-13

## 2018-01-09 NOTE — TELEPHONE ENCOUNTER
----- Message from Lisbet Mosqueda sent at 1/9/2018 12:56 PM EST -----  Regarding: Dr. Chandan Ryan  Pt is requesting a call back confirming refills for \"Atorvastatin 10 mg\", \"Clopidogrel 75 mg\" and \"Carvedilol 3.125 mg\". Pt uses the [facility] on file. Pt best contact [phone].

## 2018-01-10 ENCOUNTER — TELEPHONE (OUTPATIENT)
Dept: CARDIOLOGY CLINIC | Age: 68
End: 2018-01-10

## 2018-01-11 ENCOUNTER — OFFICE VISIT (OUTPATIENT)
Dept: CARDIOLOGY CLINIC | Age: 68
End: 2018-01-11

## 2018-01-11 VITALS
BODY MASS INDEX: 26.69 KG/M2 | SYSTOLIC BLOOD PRESSURE: 130 MMHG | HEART RATE: 76 BPM | DIASTOLIC BLOOD PRESSURE: 80 MMHG | WEIGHT: 186 LBS

## 2018-01-11 DIAGNOSIS — I50.22 CHRONIC SYSTOLIC CHF (CONGESTIVE HEART FAILURE), NYHA CLASS 2 (HCC): Primary | ICD-10-CM

## 2018-01-11 DIAGNOSIS — I48.0 PAF (PAROXYSMAL ATRIAL FIBRILLATION) (HCC): ICD-10-CM

## 2018-01-11 DIAGNOSIS — N18.9 CHRONIC RENAL IMPAIRMENT, UNSPECIFIED CKD STAGE: ICD-10-CM

## 2018-01-11 DIAGNOSIS — I25.10 CORONARY ARTERY DISEASE INVOLVING NATIVE CORONARY ARTERY OF NATIVE HEART WITHOUT ANGINA PECTORIS: ICD-10-CM

## 2018-01-11 DIAGNOSIS — I25.5 ISCHEMIC CARDIOMYOPATHY: ICD-10-CM

## 2018-01-11 DIAGNOSIS — E87.6 HYPOKALEMIA: ICD-10-CM

## 2018-01-11 PROBLEM — E11.21 TYPE 2 DIABETES MELLITUS WITH NEPHROPATHY (HCC): Status: ACTIVE | Noted: 2018-01-11

## 2018-01-11 RX ORDER — AMIODARONE HYDROCHLORIDE 200 MG/1
200 TABLET ORAL DAILY
Qty: 30 TAB | Refills: 3 | Status: SHIPPED | OUTPATIENT
Start: 2018-01-11 | End: 2018-01-11 | Stop reason: SDUPTHER

## 2018-01-11 RX ORDER — AMIODARONE HYDROCHLORIDE 200 MG/1
200 TABLET ORAL DAILY
Qty: 30 TAB | Refills: 3 | Status: SHIPPED | OUTPATIENT
Start: 2018-01-11 | End: 2018-02-26 | Stop reason: SDUPTHER

## 2018-01-11 NOTE — MR AVS SNAPSHOT
Visit Information Date & Time Provider Department Dept. Phone Encounter #  
 1/11/2018  1:30 PM Leonarda Rinne, MD [address] [id number] Follow-up Instructions Return in about 2 months (around 3/11/2018). Your Appointments 3/5/2018  9:30 AM  
ESTABLISHED PATIENT with Fabian Benavides MD  
[address] [facility] [address]) Appt Note: 3mo fuv  
 [address] [address]  
[phone]  
  
   
 [address] [address]  
  
    
 7/25/2018 10:00 AM  
ESTABLISHED PATIENT with Stephen Whalen MD  
[facility] ([address]) Appt Note: annual  
 [address] [facility]  
[phone]  
  
   
 [address] [facility] [address] [place] [phone]  
  
    
 7/25/2018 10:00 AM  
PACEMAKER with PACEMAKER, STFRANCES  
[facility] ([facility] SCHEDULING) Appt Note: jen sci ICD/stf/study  sherin [address] [facility] [facility] [address] [address]  
[phone]  
  
   
 [address] [facility] [address] [place] [phone]  
  
    
  
 3/22/2018 10:45 AM  
REMOTE OFFICE VISIT with Becca Silveira [facility] ([facility] SCHEDULING) Appt Note: Lat ICD/stf/study [address] [facility] [address] [place] [address]  
[address] [facility] [address] [address] [address] Upcoming Health Maintenance Date Due  
 FOOT EXAM Q1 11/23/1962 EYE EXAM RETINAL OR DILATED Q1 11/23/1962 DTaP/Tdap/Td series (1 - Tdap) 11/23/1973 ZOSTER VACCINE AGE 60> 9/23/2012 GLAUCOMA SCREENING Q2Y 11/23/2017 MEDICARE YEARLY EXAM 11/23/2017 FOBT Q 1 YEAR AGE 50-75 2/16/2018 HEMOGLOBIN A1C Q6M 6/4/2018 Pneumococcal 65+ Low/Medium Risk (2 of 2 - PPSV23) 11/15/2018 MICROALBUMIN Q1 12/4/2018 LIPID PANEL Q1 12/4/2018 Allergies as of 1/11/2018  Review Complete On: 1/11/2018 By: JENI De La Torre Severity Noted Reaction Type Reactions Heparin (Porcine)  02/07/2017    Unknown (comments) Current Immunizations  Reviewed on 12/4/2017 Name Date Influenza Nasal Vaccine 11/15/2017 Pneumococcal Conjugate (PCV-13) 11/15/2017 Not reviewed this visit You Were Diagnosed With   
  
 Codes Comments Chronic systolic CHF (congestive heart failure), NYHA class 2 ([facility])    -  Primary ICD-10-CM: M68.22 ICD-9-CM: 428.22, 428.0 PAF (paroxysmal atrial fibrillation) ([facility])     ICD-10-CM: I48.0 ICD-9-CM: 427.31 Ischemic cardiomyopathy     ICD-10-CM: I25.5 ICD-9-CM: 414.8 Coronary artery disease involving native coronary artery of native heart without angina pectoris     ICD-10-CM: I25.10 ICD-9-CM: 414.01 Chronic renal impairment, unspecified CKD stage     ICD-10-CM: N18.9 ICD-9-CM: 704. 9 Hypokalemia     ICD-10-CM: E87.6 ICD-9-CM: 276.8 Vitals BP Pulse Weight(growth percentile) BMI Smoking Status 130/80 76 186 lb (84.4 kg) 26.69 kg/m2 Former Smoker BMI and BSA Data Body Mass Index Body Surface Area  
 26.69 kg/m 2 2.04 m 2 Preferred Pharmacy Pharmacy Name Phone [address] [place] [phone] Your Updated Medication List  
  
   
This list is accurate as of: 1/11/18  1:42 PM.  Always use your most recent med list.  
  
  
  
  
 amiodarone 200 mg tablet Commonly known as:  CORDARONE Take 1 Tab by mouth daily. TAKE ONE TABLET BY MOUTH EVERY 12 HOURS  Indications: PREVENTION OF RECURRENT ATRIAL FIBRILLATION  
  
 aspirin delayed-release 81 mg tablet Take 1 Tab by mouth daily. atorvastatin 10 mg tablet Commonly known as:  LIPITOR  
TAKE ONE TABLET BY MOUTH ONCE DAILY  
  
 bumetanide 1 mg tablet Commonly known as:  Epimenio Watton Take 1 mg by mouth as needed. carvedilol 3.125 mg tablet Commonly known as:  COREG  
TAKE ONE TABLET BY MOUTH TWICE DAILY WITH MEALS  
  
 clopidogrel 75 mg Tab Commonly known as:  PLAVIX Take 1 Tab by mouth daily. DULoxetine 30 mg capsule Commonly known as:  CYMBALTA Take 1 Cap by mouth daily. insulin NPH/insulin regular 100 unit/mL (70-30) injection Commonly known as:  NovoLIN 70/30  
10 units in am and 8 units in pm  
  
 Insulin Syringes (Disposable) 1 mL Syrg Pt to take 10 units w/ breakfast and 8 units w/ dinner  
  
 patiromer calcium sorbitex 8.4 gram powder Commonly known as:  VELTASSA Take 8.4 g by mouth daily. sacubitril-valsartan  mg tablet Commonly known as:  ENTRESTO Take 1 Tab by mouth two (2) times a day. spironolactone 25 mg tablet Commonly known as:  ALDACTONE Take 1 Tab by mouth daily. TYLENOL EXTRA STRENGTH 500 mg tablet Generic drug:  acetaminophen Take 1,000 mg by mouth every six (6) hours as needed for Pain. Indications: BACK PAIN Prescriptions Sent to Pharmacy Refills  
 amiodarone (CORDARONE) 200 mg tablet 3 Sig: Take 1 Tab by mouth daily. TAKE ONE TABLET BY MOUTH EVERY 12 HOURS  Indications: PREVENTION OF RECURRENT ATRIAL FIBRILLATION Class: Normal  
 Pharmacy: [facility] [address] [address], [address] [place] [address] Ph #: [phone] Route: Oral  
  
We Performed the Following AMB POC EKG ROUTINE W/ 12 LEADS, INTER & REP [85471 CPT(R)] AMIODARONE & METABOLITE [id number] CPT(R)] METABOLIC PANEL, BASIC [26449 CPT(R)] Follow-up Instructions Return in about 2 months (around 3/11/2018). To-Do List   
 01/11/2018 ECG:  EKG, 12 LEAD, INITIAL Patient Instructions 1. decrease amiodarone to 1 tablet daily ( 200 mg) 2. Continue other meds 3. See Dr Brittany Bear or Dr Hiro Gilmore at the [address] [facility] location [phone] 4. Lose weight :) 5. Take bumex if needed 6.  Continue daily weights (in the morning, after passing your urine). Notify HF team of overnight weight gains > 2 lbs or weekly >5 lbs or if any of the following Sx. Continue to limit sodium intake & monitor your fluid intake (not to exceed 2L per day). 7. Labs today [facility] for any problems, questions or concerns Introducing [facility]! [employer] introduces [employer] patient portal. Now you can access parts of your medical record, email your doctor's office, and request medication refills online. 1. In your internet browser, go to [email or web address] 2. Click on the First Time User? Click Here link in the Sign In box. You will see the New Member Sign Up page. 3. Enter your Social Strategy 1 Access Code exactly as it appears below. You will not need to use this code after youve completed the sign-up process. If you do not sign up before the expiration date, you must request a new code. · Social Strategy 1 Access Code: [email or web address] Expires: 1/21/2018  7:14 AM 
 
4. Enter the last four digits of your Social Security Number (xxxx) and Date of Birth (mm/dd/yyyy) as indicated and click Submit. You will be taken to the next sign-up page. 5. Create a Social Strategy 1 ID. This will be your Social Strategy 1 login ID and cannot be changed, so think of one that is secure and easy to remember. 6. Create a [employer] 1 password. You can change your password at any time. 7. Enter your Password Reset Question and Answer. This can be used at a later time if you forget your password. 8. Enter your e-mail address. You will receive e-mail notification when new information is available in [address] 9. Click Sign Up. You can now view and download portions of your medical record. 10. Click the Download Summary menu link to download a portable copy of your medical information.  
 
If you have questions, please visit the Frequently Asked Questions section of the [facility]. Remember, [employer] is NOT to be used for urgent needs. For medical emergencies, dial 911. Now available from your iPhone and Android! Please provide this summary of care documentation to your next provider. Your primary care clinician is listed as MICHAEL BALLARD. If you have any questions after today's visit, please call [phone].

## 2018-01-11 NOTE — PROGRESS NOTES
[facility] Progress Note      DOS:  1/11/2018  REFERRING PROVIDER:  Deo Queen MD  PRIMARY CARE PHYSICIAN: Narendra Rueda MD  EP: Norbert Addison MD       Impression / Plan:  1. Chronic systolic heart failure (EF 25%), d/t ICM, ACC/AHA Stage D, NYHA class II- off milrinone sine May   Continue coreg, entresto, aldactone   Prn bumex   Labs today johnny K     Continue Veltassa   Continue Bumex PRN - last used months ago   Continue daily weights, sodium and fluid restriction   Follow up in 2 months, sooner for problems       2. CAD s/p PCI   Continue ASA, Plavix, Coreg and statin per cardiology. I have referred him to Dr Jam Card or Dr. Pierre Hazel at the [facility] office          3. CKD- Cr stable 1.77   monitor      4. PAF-- remains SR   Decrease amiodarone to 200 mg daily- LFT, TSH OK, will ck amio level    Dr Christian Cardona to follow    No anticoag 2/2 GI bleed/AVMs         5. H/O lower GIB   Still has colonic polyp and AVM -plan for colonoscopy 2/5/18- probably OK to stop plavix, but defer to cardiology. His stent was placed 2/9/17    6. Diabetes   Insulin management by PCP  7. XOL- per PCP    Thank you for allowing me to participate in the care of this delightfully pleasant gentleman. Please do not hesitate to call with any questions. Opal Cummings RN, ACNP-BC, [facility]        Chief Complaint   Patient presents with    Follow Up Chronic Condition    Weight Gain     HPI: 74y.o. year old male with a history of chronic systolic heart failure secondary to ICM. His history is also pertinent for CAD s/p PCI of LM,  of RCA, PAF (amiodarone)s/p ICD, Hep C, past GI bleed, and remote tobacco abuse. He presents to clinic today for follow up of his heart failure. He is feeling well. He has been off Milrinone since End of May 2017 and feeling well. He remains active, denies exertional sx. He does not exercise regularly but stays busy with errands, house and yard maintenance.  He is not interested in cardiac rehab. His weight had increased 2/2 food intake. He has not used bumex for past 2 months. Denies exertional chest pain, palpitations, orthopnea, PND, syncope, near syncope, no AICD shocks, no bleeding. He is compliant with medications, low NA diet. He limits his K rich food intake. He is sleeping well, appetite good. He is looking for a new cardiologist now ht at Dr Tamiko Rivas has left [facility]. He is scheduled for colonoscopy 2/5 and asking about stopping plavix. He had upper and lower endo 2/2017, found poly did not remove 2/2 stent and need for DAPT. Capsule (-) as well. CARDIAC EVALUATION   ECHO (2/13/17): EF 10% severe GHK, PSM. Dil LA. Mod MR. Mild to mod TR. Left pleural effusion  ECHO (1/19/17): EF 5-10% with severe GHK,. Mildly dil LA. Mild TR. PASP 46. ECHO: (5/15/17) EF 25%, severe GHK, basal-mid inferior AK, severe LAE, Mod MR, Mild TR,      CATH (1/20/17): LM ost70. LAD m50. D1 80. LCx d70; OM1 99, OM2 90. RCA p100. No AVG. PAP 53/27/36. --- s/p PCI (2/9/17): pRCA 2.5x38 Xience + 2.75x12 Xience. ostLM 4.0x12 Xience post-dil with 4.5x12 NC. ICD (6/12/17, Anup): [employer]    CARDIAC MRI 1/31/17:LVEF 10%, LVH, RV dilated RVEF 25% GHK, marked LAE, mod CB, mild MR, mod-severe TR,  The entire LAD territory : largely viable and should recover upon  revascularization. The entire RCA territory is largely viable and should recover  upon revascularization. The LCx territory demonstrate medium-size infarct with  limited viability. Large left-sided pleural effusion with passive atelectasis of the left lung. RICKY (3/20/17): R: 0.58, L: 0.56      ROS:    General:  negative  HEENT: Constant runny nose   Pulmonary: no cough, shortness of breath, or wheezing  Cardiac: negative   GI:  Negative   Musculo: negative  Neuro: negative  Skin:  negative      History:  Past Medical History:   Diagnosis Date    Cardiomyopathy ([facility] 1/20/2017    A. Echo (1/19/17):  EF 5-10% with severe GHK,. Mildly dil LA. Mild TR. PASP 46.    Chronic renal insufficiency 3/6/2017    Diabetes mellitus ([facility] 3/6/2017    Dyslipidemia 3/6/2017    A. FLP (1/19/17): Tot 120, , HDL 19, LDL 76 (no Rx).  H/O: GI bleed 3/6/2017    Hepatitis C 3/6/2017    [facility] 3/6/2017    Peripheral vascular disease ([facility] 9/18/2017    A. RICKY (3/20/17): Right 0.58, Left 0.56. Past Surgical History:   Procedure Laterality Date    COLONOSCOPY N/A 2/17/2017    COLONOSCOPY performed by Lillian Han. Davion Payan MD at [facility] ENDOSCOPY   Psychiatric COLONOSCOPY      [facility] 2/2017     Social History     Social History    Marital status:      Spouse name: N/A    Number of children: N/A    Years of education: N/A     Occupational History    Not on file. Social History Main Topics    Smoking status: Former Smoker     Packs/day: 2.00     Years: 45.00     Quit date: 1/20/2017    Smokeless tobacco: Never Used    Alcohol use No    Drug use: No    Sexual activity: No     Other Topics Concern    Not on file     Social History Narrative     Family History   Problem Relation Age of Onset    No Known Problems Mother     Cancer Father     Colon Cancer Father        Current Medications:   Current Outpatient Prescriptions   Medication Sig Dispense Refill    carvedilol (COREG) 3.125 mg tablet TAKE ONE TABLET BY MOUTH TWICE DAILY WITH MEALS 60 Tab 4    atorvastatin (LIPITOR) 10 mg tablet TAKE ONE TABLET BY MOUTH ONCE DAILY 30 Tab 4    clopidogrel (PLAVIX) 75 mg tab Take 1 Tab by mouth daily. 30 Tab 4    insulin NPH/insulin regular (NOVOLIN 70/30) 100 unit/mL (70-30) injection 10 units in am and 8 units in pm 10 mL 5    sacubitril-valsartan (ENTRESTO) 97 mg/103 mg tablet Take 1 Tab by mouth two (2) times a day. 180 Tab 4    patiromer calcium sorbitex (VELTASSA) 8.4 gram powder Take 8.4 g by mouth daily. 4 Packet 0    spironolactone (ALDACTONE) 25 mg tablet Take 1 Tab by mouth daily.  90 Tab 1    amiodarone (CORDARONE) 200 mg tablet TAKE ONE TABLET BY MOUTH EVERY 12 HOURS 60 Tab 4    acetaminophen (TYLENOL EXTRA STRENGTH) 500 mg tablet Take 1,000 mg by mouth every six (6) hours as needed for Pain. Indications: BACK PAIN      DULoxetine (CYMBALTA) 30 mg capsule Take 1 Cap by mouth daily. 30 Cap 5    aspirin delayed-release 81 mg tablet Take 1 Tab by mouth daily. 30 Tab 1    Insulin Syringes, Disposable, 1 mL syrg Pt to take 10 units w/ breakfast and 8 units w/ dinner 500 Syringe 1    bumetanide (BUMEX) 1 mg tablet Take 1 mg by mouth as needed. Allergies: Allergies   Allergen Reactions    Heparin (Porcine) Unknown (comments)       Physical Exam:   Physical Exam   Constitutional: A&O x 2, well developed, WM in NAD  -older than his stated age   HENT[de-identified] Normocephalic and atraumatic. mucous membranes pink, moist   Neck: Neck supple. No lymphadenopathy JVD present. Cardiovascular: PMI nondisplaced, AICD R infraclavicular space, RRR, S1 & S2, 2/6 systolic murmur at apex, no S3, JVP 10 cm, no HJR. Pulmonary/Chest: Effort normal and breath sounds normal. No respiratory distress. Abdominal: Soft. He exhibits no distension. There is no tenderness. Neurological: He is alert and oriented to person, place, and time. Skin: Skin is warm and dry. Psychiatric: He has a normal mood and affect.        Vitals:    Visit Vitals    /80    Pulse 76    Wt 186 lb (84.4 kg)    BMI 26.69 kg/m2           Recent Labs:   Lab Results   Component Value Date/Time    WBC 7.5 12/04/2017 10:17 AM    HGB 13.5 12/04/2017 10:17 AM    HCT 39.8 12/04/2017 10:17 AM    PLATELET 708 98/70/7246 10:17 AM    MCV 81 12/04/2017 10:17 AM     Lab Results   Component Value Date/Time    TSH 3.040 12/04/2017 10:17 AM      Lab Results   Component Value Date/Time    BNP 1209 01/21/2017 12:25 PM    NT pro-BNP 6258 01/31/2017 04:52 AM    NT pro-BNP 6094 01/30/2017 04:16 AM    NT pro-BNP 6168 01/29/2017 04:18 AM    NT pro-BNP 6876 01/28/2017 03:38 AM    NT pro-BNP 5427 01/27/2017 03:56 AM      Lab Results   Component Value Date/Time    Sodium 139 01/03/2018 03:38 PM    Potassium 5.2 01/03/2018 03:38 PM    Chloride 104 01/03/2018 03:38 PM    CO2 23 01/03/2018 03:38 PM    Anion gap 9 02/22/2017 03:16 AM    Glucose 130 01/03/2018 03:38 PM    BUN 38 01/03/2018 03:38 PM    Creatinine 1.77 01/03/2018 03:38 PM    BUN/Creatinine ratio 21 01/03/2018 03:38 PM    GFR est AA 46 01/03/2018 03:38 PM    GFR est non-AA 39 01/03/2018 03:38 PM    Calcium 9.1 01/03/2018 03:38 PM    Bilirubin, total 0.2 01/03/2018 03:38 PM    ALT (SGPT) 26 01/03/2018 03:38 PM    AST (SGOT) 20 01/03/2018 03:38 PM    Alk.  phosphatase 61 01/03/2018 03:38 PM    Protein, total 6.7 01/03/2018 03:38 PM    Albumin 4.2 01/03/2018 03:38 PM    Globulin 4.4 02/22/2017 03:16 AM    A-G Ratio 1.7 01/03/2018 03:38 PM      Lab Results   Component Value Date/Time    Hemoglobin A1c 6.2 12/04/2017 10:17 AM    Hemoglobin A1c (POC) 6.4 08/17/2017 11:00 AM        Labs Latest Ref Rng & Units 1/3/2018 12/4/2017 12/1/2017 11/16/2017   WBC 3.4 - 10.8 x10E3/uL - 7.5 - -   RBC 4.14 - 5.80 x10E6/uL - 4.92 - -   Hemoglobin 13.0 - 17.7 g/dL - 13.5 - -   Hematocrit 37.5 - 51.0 % - 39.8 - -   MCV 79 - 97 fL - 81 - -   Platelets 205 - 694 Y25V4/KR - 158 - -   Monocytes Not Estab. % - 12 - -   Eosinophils Not Estab. % - 4 - -   Basophils Not Estab. % - 1 - -   Albumin 3.6 - 4.8 g/dL 4.2 4.4 4.1 4.5   Calcium 8.6 - 10.2 mg/dL 9.1 9.6 8.9 9.1   SGOT 0 - 40 IU/L 20 31 20 24   Glucose 65 - 99 mg/dL 130(H) 111(H) 121(H) 79   BUN 8 - 27 mg/dL 38(H) 28(H) 30(H) 42(H)   Creatinine 0.76 - 1.27 mg/dL 1.77(H) 2.07(H) 1.65(H) 1.75(H)   Sodium 134 - 144 mmol/L 139 140 141 144   Potassium 3.5 - 5.2 mmol/L 5.2 4.9 4.7 5.4(H)   TSH 0.450 - 4.500 uIU/mL - 3.040 - -   Some recent data might be hidden             Yasmeen Blevins, [address] [place] [address]  [phone]  24 hour VAD/HF Pager: [phone]

## 2018-01-11 NOTE — COMMUNICATION BODY
[facility] Progress Note      DOS:  1/11/2018  REFERRING PROVIDER:  Beto Whitehead MD  PRIMARY CARE PHYSICIAN: Mickie Swanson MD  EP: Mone Lunsford MD       Impression / Plan:  1. Chronic systolic heart failure (EF 25%), d/t ICM, ACC/AHA Stage D, NYHA class II- off milrinone sine May   Continue coreg, entresto, aldactone   Prn bumex   Labs today johnny K     Continue Veltassa   Continue Bumex PRN - last used months ago   Continue daily weights, sodium and fluid restriction   Follow up in 2 months, sooner for problems       2. CAD s/p PCI   Continue ASA, Plavix, Coreg and statin per cardiology. I have referred him to Dr Dereck Blackwood or Dr. Jorge Luis Briones at the [facility] office          3. CKD- Cr stable 1.77   monitor      4. PAF-- remains SR   Decrease amiodarone to 200 mg daily- LFT, TSH OK, will ck amio level    Dr Natasha Medina to follow    No anticoag 2/2 GI bleed/AVMs         5. H/O lower GIB   Still has colonic polyp and AVM -plan for colonoscopy 2/5/18- probably OK to stop plavix, but defer to cardiology. His stent was placed 2/9/17    6. Diabetes   Insulin management by PCP  7. XOL- per PCP    Thank you for allowing me to participate in the care of this delightfully pleasant gentleman. Please do not hesitate to call with any questions. Opal Denney  RN, ACNP-BC, [facility]        Chief Complaint   Patient presents with    Follow Up Chronic Condition    Weight Gain     HPI: 74y.o. year old male with a history of chronic systolic heart failure secondary to ICM. His history is also pertinent for CAD s/p PCI of LM,  of RCA, PAF (amiodarone)s/p ICD, Hep C, past GI bleed, and remote tobacco abuse. He presents to clinic today for follow up of his heart failure. He is feeling well. He has been off Milrinone since End of May 2017 and feeling well. He remains active, denies exertional sx. He does not exercise regularly but stays busy with errands, house and yard maintenance.  He is not interested in cardiac rehab. His weight had increased 2/2 food intake. He has not used bumex for past 2 months. Denies exertional chest pain, palpitations, orthopnea, PND, syncope, near syncope, no AICD shocks, no bleeding. He is compliant with medications, low NA diet. He limits his K rich food intake. He is sleeping well, appetite good. He is looking for a new cardiologist now ht at Dr Ute Donohue has left [facility]. He is scheduled for colonoscopy 2/5 and asking about stopping plavix. He had upper and lower endo 2/2017, found poly did not remove 2/2 stent and need for DAPT. Capsule (-) as well. CARDIAC EVALUATION   ECHO (2/13/17): EF 10% severe GHK, PSM. Dil LA. Mod MR. Mild to mod TR. Left pleural effusion  ECHO (1/19/17): EF 5-10% with severe GHK,. Mildly dil LA. Mild TR. PASP 46. ECHO: (5/15/17) EF 25%, severe GHK, basal-mid inferior AK, severe LAE, Mod MR, Mild TR,      CATH (1/20/17): LM ost70. LAD m50. D1 80. LCx d70; OM1 99, OM2 90. RCA p100. No AVG. PAP 53/27/36. --- s/p PCI (2/9/17): pRCA 2.5x38 Xience + 2.75x12 Xience. ostLM 4.0x12 Xience post-dil with 4.5x12 NC. ICD (6/12/17, Anup): [employer]    CARDIAC MRI 1/31/17:LVEF 10%, LVH, RV dilated RVEF 25% GHK, marked LAE, mod CB, mild MR, mod-severe TR,  The entire LAD territory : largely viable and should recover upon  revascularization. The entire RCA territory is largely viable and should recover  upon revascularization. The LCx territory demonstrate medium-size infarct with  limited viability. Large left-sided pleural effusion with passive atelectasis of the left lung. RICKY (3/20/17): R: 0.58, L: 0.56      ROS:    General:  negative  HEENT: Constant runny nose   Pulmonary: no cough, shortness of breath, or wheezing  Cardiac: negative   GI:  Negative   Musculo: negative  Neuro: negative  Skin:  negative      History:  Past Medical History:   Diagnosis Date    Cardiomyopathy ([facility] 1/20/2017    A. Echo (1/19/17):  EF 5-10% with severe GHK,. Mildly dil LA. Mild TR. PASP 46.    Chronic renal insufficiency 3/6/2017    Diabetes mellitus ([facility] 3/6/2017    Dyslipidemia 3/6/2017    A. FLP (17): Tot 120, , HDL 19, LDL 76 (no Rx).  H/O: GI bleed 3/6/2017    Hepatitis C 3/6/2017    [facility] 3/6/2017    [facility] ([email or web address] 2017    A. RICKY (3/20/17): Right 0.58, Left 0.56. Past Surgical History:   Procedure Laterality Date    COLONOSCOPY N/A 2017    COLONOSCOPY performed by Corinne Mireles MD at [facility] ENDOSCOPY   [facility] COLONOSCOPY      [facility] 2017     Social History     Social History    Marital status:      Spouse name: N/A    Number of children: N/A    Years of education: N/A     Occupational History    Not on file. Social History Main Topics    Smoking status: Former Smoker     Packs/day: 2.00     Years: 45.00     Quit date: 2017    Smokeless tobacco: Never Used    Alcohol use No    Drug use: No    Sexual activity: No     Other Topics Concern    Not on file     Social History Narrative     Family History   Problem Relation Age of Onset    No Known Problems Mother     Cancer Father     Colon Cancer Father        Current Medications:   Current Outpatient Prescriptions   Medication Sig Dispense Refill    carvedilol (COREG) 3.125 mg tablet TAKE ONE TABLET BY MOUTH TWICE DAILY WITH MEALS 60 Tab 4    atorvastatin (LIPITOR) 10 mg tablet TAKE ONE TABLET BY MOUTH ONCE DAILY 30 Tab 4    clopidogrel (PLAVIX) 75 mg tab Take 1 Tab by mouth daily. 30 Tab 4    insulin NPH/insulin regular (NOVOLIN 70/30) 100 unit/mL (70-30) injection 10 units in am and 8 units in pm 10 mL 5    sacubitril-valsartan (ENTRESTO) 97 mg/103 mg tablet Take 1 Tab by mouth two (2) times a day. 180 Tab 4    patiromer calcium sorbitex (VELTASSA) 8.4 gram powder Take 8.4 g by mouth daily. 4 Packet 0    spironolactone (ALDACTONE) 25 mg tablet Take 1 Tab by mouth daily.  90 Tab 1    amiodarone (CORDARONE) 200 mg tablet TAKE ONE TABLET BY MOUTH EVERY 12 HOURS 60 Tab 4    acetaminophen (TYLENOL EXTRA STRENGTH) 500 mg tablet Take 1,000 mg by mouth every six (6) hours as needed for Pain. Indications: BACK PAIN      DULoxetine (CYMBALTA) 30 mg capsule Take 1 Cap by mouth daily. 30 Cap 5    aspirin delayed-release 81 mg tablet Take 1 Tab by mouth daily. 30 Tab 1    Insulin Syringes, Disposable, 1 mL syrg Pt to take 10 units w/ breakfast and 8 units w/ dinner 500 Syringe 1    bumetanide (BUMEX) 1 mg tablet Take 1 mg by mouth as needed. Allergies: Allergies   Allergen Reactions    Heparin (Porcine) Unknown (comments)       Physical Exam:   Physical Exam   Constitutional: A&O x 2, well developed, WM in NAD  -older than his stated age   HENT[de-identified] Normocephalic and atraumatic. mucous membranes pink, moist   Neck: Neck supple. No lymphadenopathy JVD present. Cardiovascular: PMI nondisplaced, AICD R infraclavicular space, RRR, S1 & S2, 2/6 systolic murmur at apex, no S3, JVP 10 cm, no HJR. Pulmonary/Chest: Effort normal and breath sounds normal. No respiratory distress. Abdominal: Soft. He exhibits no distension. There is no tenderness. Neurological: He is alert and oriented to person, place, and time. Skin: Skin is warm and dry. Psychiatric: He has a normal mood and affect.        Vitals:    Visit Vitals    /80    Pulse 76    Wt 186 lb (84.4 kg)    BMI 26.69 kg/m2           Recent Labs:   Lab Results   Component Value Date/Time    WBC 7.5 12/04/2017 10:17 AM    HGB 13.5 12/04/2017 10:17 AM    HCT 39.8 12/04/2017 10:17 AM    PLATELET 681 33/80/3294 10:17 AM    MCV 81 12/04/2017 10:17 AM     Lab Results   Component Value Date/Time    TSH 3.040 12/04/2017 10:17 AM      Lab Results   Component Value Date/Time    BNP 1209 01/21/2017 12:25 PM    NT pro-BNP 6258 01/31/2017 04:52 AM    NT pro-BNP 6094 01/30/2017 04:16 AM    NT pro-BNP 6168 01/29/2017 04:18 AM    NT pro-BNP 6876 01/28/2017 03:38 AM    NT pro-BNP 5427 01/27/2017 03:56 AM      Lab Results   Component Value Date/Time    Sodium 139 01/03/2018 03:38 PM    Potassium 5.2 01/03/2018 03:38 PM    Chloride 104 01/03/2018 03:38 PM    CO2 23 01/03/2018 03:38 PM    Anion gap 9 02/22/2017 03:16 AM    Glucose 130 01/03/2018 03:38 PM    BUN 38 01/03/2018 03:38 PM    Creatinine 1.77 01/03/2018 03:38 PM    BUN/Creatinine ratio 21 01/03/2018 03:38 PM    GFR est AA 46 01/03/2018 03:38 PM    GFR est non-AA 39 01/03/2018 03:38 PM    Calcium 9.1 01/03/2018 03:38 PM    Bilirubin, total 0.2 01/03/2018 03:38 PM    ALT (SGPT) 26 01/03/2018 03:38 PM    AST (SGOT) 20 01/03/2018 03:38 PM    Alk.  phosphatase 61 01/03/2018 03:38 PM    Protein, total 6.7 01/03/2018 03:38 PM    Albumin 4.2 01/03/2018 03:38 PM    Globulin 4.4 02/22/2017 03:16 AM    A-G Ratio 1.7 01/03/2018 03:38 PM      Lab Results   Component Value Date/Time    Hemoglobin A1c 6.2 12/04/2017 10:17 AM    Hemoglobin A1c (POC) 6.4 08/17/2017 11:00 AM        Labs Latest Ref Rng & Units 1/3/2018 12/4/2017 12/1/2017 11/16/2017   WBC 3.4 - 10.8 x10E3/uL - 7.5 - -   RBC 4.14 - 5.80 x10E6/uL - 4.92 - -   Hemoglobin 13.0 - 17.7 g/dL - 13.5 - -   Hematocrit 37.5 - 51.0 % - 39.8 - -   MCV 79 - 97 fL - 81 - -   Platelets 027 - 990 J19T1/RC - 158 - -   Monocytes Not Estab. % - 12 - -   Eosinophils Not Estab. % - 4 - -   Basophils Not Estab. % - 1 - -   Albumin 3.6 - 4.8 g/dL 4.2 4.4 4.1 4.5   Calcium 8.6 - 10.2 mg/dL 9.1 9.6 8.9 9.1   SGOT 0 - 40 IU/L 20 31 20 24   Glucose 65 - 99 mg/dL 130(H) 111(H) 121(H) 79   BUN 8 - 27 mg/dL 38(H) 28(H) 30(H) 42(H)   Creatinine 0.76 - 1.27 mg/dL 1.77(H) 2.07(H) 1.65(H) 1.75(H)   Sodium 134 - 144 mmol/L 139 140 141 144   Potassium 3.5 - 5.2 mmol/L 5.2 4.9 4.7 5.4(H)   TSH 0.450 - 4.500 uIU/mL - 3.040 - -   Some recent data might be hidden             Ruy Gaines, [address]  [address] [place] [address]  [phone]  24 hour VAD/HF Pager: [phone]

## 2018-01-11 NOTE — PATIENT INSTRUCTIONS
1. decrease amiodarone to 1 tablet daily ( 200 mg)  2. Continue other meds  3. See Dr Kathy German or Dr Little Chery at the [address] [facility] location [phone]  4. Lose weight :)  5. Take bumex if needed   6. Continue daily weights (in the morning, after passing your urine). Notify HF team of overnight weight gains > 2 lbs or weekly >5 lbs or if any of the following Sx. Continue to limit sodium intake & monitor your fluid intake (not to exceed 2L per day). 7. Labs today     Callus for any problems, questions or concerns    You have an appointment scheduled for Friday, March 9, 9:40am with Dr. Litlte Chery at [facility], [address] [facility] [address]  Phone number is [phone]

## 2018-01-12 LAB
AMIODARONE SERPL-MCNC: NORMAL UG/ML
DESETHYLAMIODARONE SERPL-MCNC: NORMAL MG/L
GLUCOSE SERPL-MCNC: NORMAL MG/DL
SPECIMEN STATUS REPORT, ROLRST: NORMAL

## 2018-01-16 LAB — SPECIMEN STATUS REPORT, ROLRST: NORMAL

## 2018-01-18 ENCOUNTER — TELEPHONE (OUTPATIENT)
Dept: CARDIOLOGY CLINIC | Age: 68
End: 2018-01-18

## 2018-01-18 NOTE — TELEPHONE ENCOUNTER
Tima Lomax called from [place] [id number] stating pt is having surgery and needs clearance since he is on Plavix  Please call [phone]     KWX#[phone]

## 2018-01-18 NOTE — TELEPHONE ENCOUNTER
I returned call to Cassie Maya at [facility] and advised her per Solomon Due to contact Dr. Leverette Boast' office since he manages Plavix due to placement of stents. She states understanding.

## 2018-01-18 NOTE — TELEPHONE ENCOUNTER
Lisa Mclean with Dr cleaning's office called, patient is due to have a colonoscopy 2.5.18 and they need a cardiac clearance for him to come off his blood thinner   Phone: [phone]  Fax: [phone]

## 2018-01-19 NOTE — TELEPHONE ENCOUNTER
Unable to get through to the GI doc office. I did reach the pt on his mobile number and gave instructions.

## 2018-01-19 NOTE — TELEPHONE ENCOUNTER
Mr. Jae Taylor can hold his plavix 5 days prior to colonoscopy and resume as soon as it is safe. His aspirin 81 mg daily needs to continue uninterrupted.      Thanks,   SK (Routing comment)

## 2018-02-02 ENCOUNTER — DOCUMENTATION ONLY (OUTPATIENT)
Dept: CARDIOLOGY CLINIC | Age: 68
End: 2018-02-02

## 2018-02-02 NOTE — PROGRESS NOTES
Called the patient to notify him  received a fax from [employer] that his entresto patient assistance application needs to be renewed. Inquired when he can come to [facility] to sign a new application and asked him to bring proof of income for the income verification. He shared he has a colonoscopy scheduled for Monday but that he can come to the office later in the week.  asked that he call in advance to notify  of when he plans to fill out the paperwork. Will await a return phone call from the patient.     Trena Sumner, MSW, LCSW    Clinical    Jennifer [phone]   [employer] ® ACP Facilitator

## 2018-02-05 ENCOUNTER — ANESTHESIA EVENT (OUTPATIENT)
Dept: ENDOSCOPY | Age: 68
End: 2018-02-05
Payer: MEDICARE

## 2018-02-05 ENCOUNTER — ANESTHESIA (OUTPATIENT)
Dept: ENDOSCOPY | Age: 68
End: 2018-02-05
Payer: MEDICARE

## 2018-02-05 ENCOUNTER — HOSPITAL ENCOUNTER (OUTPATIENT)
Age: 68
Setting detail: OUTPATIENT SURGERY
Discharge: HOME | End: 2018-02-05
Attending: INTERNAL MEDICINE | Admitting: INTERNAL MEDICINE
Payer: MEDICARE

## 2018-02-05 VITALS
WEIGHT: 176 LBS | HEART RATE: 71 BPM | DIASTOLIC BLOOD PRESSURE: 69 MMHG | OXYGEN SATURATION: 99 % | BODY MASS INDEX: 25.2 KG/M2 | HEIGHT: 70 IN | RESPIRATION RATE: 18 BRPM | TEMPERATURE: 97.5 F | SYSTOLIC BLOOD PRESSURE: 116 MMHG

## 2018-02-05 LAB
GLUCOSE BLD STRIP.AUTO-MCNC: 76 MG/DL (ref 65–100)
GLUCOSE BLD STRIP.AUTO-MCNC: 77 MG/DL (ref 65–100)
GLUCOSE BLD STRIP.AUTO-MCNC: 80 MG/DL (ref 65–100)
SERVICE CMNT-IMP: NORMAL

## 2018-02-05 PROCEDURE — 77030013992 HC SNR POLYP ENDOSC BSC -B: Performed by: INTERNAL MEDICINE

## 2018-02-05 PROCEDURE — 77030011640 HC PAD GRND REM COVD -A: Performed by: INTERNAL MEDICINE

## 2018-02-05 PROCEDURE — 82962 GLUCOSE BLOOD TEST: CPT

## 2018-02-05 PROCEDURE — 74011250636 HC RX REV CODE- 250/636: Performed by: INTERNAL MEDICINE

## 2018-02-05 PROCEDURE — 77030009426 HC FCPS BIOP ENDOSC BSC -B: Performed by: INTERNAL MEDICINE

## 2018-02-05 PROCEDURE — 77030027957 HC TBNG IRR ENDOGTR BUSS -B: Performed by: INTERNAL MEDICINE

## 2018-02-05 PROCEDURE — 76060000033 HC ANESTHESIA 1 TO 1.5 HR: Performed by: INTERNAL MEDICINE

## 2018-02-05 PROCEDURE — 88305 TISSUE EXAM BY PATHOLOGIST: CPT | Performed by: INTERNAL MEDICINE

## 2018-02-05 PROCEDURE — 76040000008: Performed by: INTERNAL MEDICINE

## 2018-02-05 PROCEDURE — 74011250636 HC RX REV CODE- 250/636

## 2018-02-05 PROCEDURE — 74011000250 HC RX REV CODE- 250

## 2018-02-05 RX ORDER — SODIUM CHLORIDE 0.9 % (FLUSH) 0.9 %
5-10 SYRINGE (ML) INJECTION AS NEEDED
Status: DISCONTINUED | OUTPATIENT
Start: 2018-02-05 | End: 2018-02-05 | Stop reason: HOSPADM

## 2018-02-05 RX ORDER — SODIUM CHLORIDE 0.9 % (FLUSH) 0.9 %
5-10 SYRINGE (ML) INJECTION EVERY 8 HOURS
Status: DISCONTINUED | OUTPATIENT
Start: 2018-02-05 | End: 2018-02-05 | Stop reason: HOSPADM

## 2018-02-05 RX ORDER — FENTANYL CITRATE 50 UG/ML
100 INJECTION, SOLUTION INTRAMUSCULAR; INTRAVENOUS
Status: DISCONTINUED | OUTPATIENT
Start: 2018-02-05 | End: 2018-02-05 | Stop reason: HOSPADM

## 2018-02-05 RX ORDER — FLUMAZENIL 0.1 MG/ML
0.2 INJECTION INTRAVENOUS
Status: DISCONTINUED | OUTPATIENT
Start: 2018-02-05 | End: 2018-02-05 | Stop reason: HOSPADM

## 2018-02-05 RX ORDER — LIDOCAINE HYDROCHLORIDE 20 MG/ML
INJECTION, SOLUTION EPIDURAL; INFILTRATION; INTRACAUDAL; PERINEURAL AS NEEDED
Status: DISCONTINUED | OUTPATIENT
Start: 2018-02-05 | End: 2018-02-05 | Stop reason: HOSPADM

## 2018-02-05 RX ORDER — EPINEPHRINE 0.1 MG/ML
1 INJECTION INTRACARDIAC; INTRAVENOUS
Status: DISCONTINUED | OUTPATIENT
Start: 2018-02-05 | End: 2018-02-05 | Stop reason: HOSPADM

## 2018-02-05 RX ORDER — ATROPINE SULFATE 0.1 MG/ML
0.5 INJECTION INTRAVENOUS
Status: DISCONTINUED | OUTPATIENT
Start: 2018-02-05 | End: 2018-02-05 | Stop reason: HOSPADM

## 2018-02-05 RX ORDER — NALOXONE HYDROCHLORIDE 0.4 MG/ML
0.4 INJECTION, SOLUTION INTRAMUSCULAR; INTRAVENOUS; SUBCUTANEOUS
Status: DISCONTINUED | OUTPATIENT
Start: 2018-02-05 | End: 2018-02-05 | Stop reason: HOSPADM

## 2018-02-05 RX ORDER — DEXTROMETHORPHAN/PSEUDOEPHED 2.5-7.5/.8
1.2 DROPS ORAL
Status: DISCONTINUED | OUTPATIENT
Start: 2018-02-05 | End: 2018-02-05 | Stop reason: HOSPADM

## 2018-02-05 RX ORDER — DIPHENHYDRAMINE HYDROCHLORIDE 50 MG/ML
50 INJECTION, SOLUTION INTRAMUSCULAR; INTRAVENOUS ONCE
Status: DISCONTINUED | OUTPATIENT
Start: 2018-02-05 | End: 2018-02-05 | Stop reason: HOSPADM

## 2018-02-05 RX ORDER — SODIUM CHLORIDE 9 MG/ML
100 INJECTION, SOLUTION INTRAVENOUS CONTINUOUS
Status: DISCONTINUED | OUTPATIENT
Start: 2018-02-05 | End: 2018-02-05 | Stop reason: HOSPADM

## 2018-02-05 RX ORDER — MIDAZOLAM HYDROCHLORIDE 1 MG/ML
.25-1 INJECTION, SOLUTION INTRAMUSCULAR; INTRAVENOUS
Status: DISCONTINUED | OUTPATIENT
Start: 2018-02-05 | End: 2018-02-05 | Stop reason: HOSPADM

## 2018-02-05 RX ORDER — PROPOFOL 10 MG/ML
INJECTION, EMULSION INTRAVENOUS AS NEEDED
Status: DISCONTINUED | OUTPATIENT
Start: 2018-02-05 | End: 2018-02-05 | Stop reason: HOSPADM

## 2018-02-05 RX ORDER — SODIUM CHLORIDE 9 MG/ML
INJECTION, SOLUTION INTRAVENOUS
Status: DISCONTINUED | OUTPATIENT
Start: 2018-02-05 | End: 2018-02-05 | Stop reason: HOSPADM

## 2018-02-05 RX ADMIN — PROPOFOL 30 MG: 10 INJECTION, EMULSION INTRAVENOUS at 09:42

## 2018-02-05 RX ADMIN — PROPOFOL 20 MG: 10 INJECTION, EMULSION INTRAVENOUS at 09:44

## 2018-02-05 RX ADMIN — PROPOFOL 50 MG: 10 INJECTION, EMULSION INTRAVENOUS at 09:19

## 2018-02-05 RX ADMIN — PROPOFOL 30 MG: 10 INJECTION, EMULSION INTRAVENOUS at 09:30

## 2018-02-05 RX ADMIN — PROPOFOL 40 MG: 10 INJECTION, EMULSION INTRAVENOUS at 09:18

## 2018-02-05 RX ADMIN — PROPOFOL 20 MG: 10 INJECTION, EMULSION INTRAVENOUS at 09:47

## 2018-02-05 RX ADMIN — PROPOFOL 30 MG: 10 INJECTION, EMULSION INTRAVENOUS at 09:23

## 2018-02-05 RX ADMIN — SODIUM CHLORIDE: 9 INJECTION, SOLUTION INTRAVENOUS at 08:51

## 2018-02-05 RX ADMIN — PROPOFOL 30 MG: 10 INJECTION, EMULSION INTRAVENOUS at 09:34

## 2018-02-05 RX ADMIN — PROPOFOL 30 MG: 10 INJECTION, EMULSION INTRAVENOUS at 09:26

## 2018-02-05 RX ADMIN — LIDOCAINE HYDROCHLORIDE 20 MG: 20 INJECTION, SOLUTION EPIDURAL; INFILTRATION; INTRACAUDAL; PERINEURAL at 09:18

## 2018-02-05 RX ADMIN — PROPOFOL 30 MG: 10 INJECTION, EMULSION INTRAVENOUS at 09:39

## 2018-02-05 RX ADMIN — PROPOFOL 30 MG: 10 INJECTION, EMULSION INTRAVENOUS at 09:37

## 2018-02-05 NOTE — IP AVS SNAPSHOT
[address] [facility] [address] 
[phone] Patient: Lisa Butler MRN: [id number] IO:[id number]/[id number] About your hospitalization You were admitted on:  February 5, 2018 You last received care in the:  [facility] ENDOSCOPY You were discharged on:  February 5, 2018 Why you were hospitalized Your primary diagnosis was:  Not on File Follow-up Information None Your Scheduled Appointments Monday March 05, 2018  9:30 AM EST  
ESTABLISHED PATIENT with Divina Martinez MD  
[address] [facility]  [address] [address] [phone] Friday March 09, 2018  9:40 AM EST  
ESTABLISHED PATIENT with Debi Miramontes MD  
[facility] ([facility]) [address] [place] [address]  
[phone] Tuesday March 13, 2018  9:30 AM EDT Follow Up with JENI Sutton [address] [facility] ([facility]  
[phone] Thursday March 22, 2018 10:45 AM EDT  
REMOTE OFFICE VISIT with Minesh Hamilton [facility] ([facility]) [address] [place] [address]  
[phone] Discharge Orders None A check vitaliy indicates which time of day the medication should be taken. My Medications CONTINUE taking these medications Instructions Each Dose to Equal  
 Morning Noon Evening Bedtime  
 amiodarone 200 mg tablet Commonly known as:  CORDARONE Your last dose was: Your next dose is: Take 1 Tab by mouth daily. Indications: PREVENTION OF RECURRENT ATRIAL FIBRILLATION  
 200 mg  
    
   
   
   
  
 aspirin delayed-release 81 mg tablet Your last dose was: Your next dose is: Take 1 Tab by mouth daily. 81 mg  
    
   
   
   
  
 atorvastatin 10 mg tablet Commonly known as:  LIPITOR Your last dose was: Your next dose is: TAKE ONE TABLET BY MOUTH ONCE DAILY  
     
   
   
   
  
 bumetanide 1 mg tablet Commonly known as:  Theodora Grange Your last dose was: Your next dose is: Take 0.5 mg by mouth as needed (fluid retention). 0.5 mg  
    
   
   
   
  
 carvedilol 3.125 mg tablet Commonly known as:  [place] Follette Dine Your last dose was: Your next dose is: TAKE ONE TABLET BY MOUTH TWICE DAILY WITH MEALS  
     
   
   
   
  
 clopidogrel 75 mg Tab Commonly known as:  PLAVIX Your last dose was: Your next dose is: Take 1 Tab by mouth daily. 75 mg DULoxetine 30 mg capsule Commonly known as:  CYMBALTA Your last dose was: Your next dose is: Take 1 Cap by mouth daily. 30 mg Insulin Syringes (Disposable) 1 mL Syrg Your last dose was: Your next dose is:    
   
   
 Pt to take 10 units w/ breakfast and 8 units w/ dinner * NovoLIN 70/30 100 unit/mL (70-30) injection Generic drug:  insulin NPH/insulin regular Your last dose was: Your next dose is:    
   
   
 10 Units by SubCUTAneous route Daily (before breakfast). 10 Units * NovoLIN 70/30 100 unit/mL (70-30) injection Generic drug:  insulin NPH/insulin regular Your last dose was: Your next dose is:    
   
   
 8 Units by SubCUTAneous route Daily (before dinner). 8 Units  
    
   
   
   
  
 patiromer calcium sorbitex 8.4 gram powder Commonly known as:  VELTASSA Your last dose was: Your next dose is: Take 8.4 g by mouth daily. 8.4 g  
    
   
   
   
  
 sacubitril-valsartan  mg tablet Commonly known as:  ENTRESTO Your last dose was: Your next dose is: Take 1 Tab by mouth two (2) times a day. 1 Tab  
    
   
   
   
  
 spironolactone 25 mg tablet Commonly known as:  ALDACTONE Your last dose was: Your next dose is: Take 1 Tab by mouth daily. 25 mg  
    
   
   
   
  
 TYLENOL EXTRA STRENGTH 500 mg tablet Generic drug:  acetaminophen Your last dose was: Your next dose is: Take 1,000 mg by mouth every six (6) hours as needed for Pain. Indications: BACK PAIN  
 1000 mg * Notice: This list has 2 medication(s) that are the same as other medications prescribed for you. Read the directions carefully, and ask your doctor or other care provider to review them with you. Discharge Instructions [address] 
[address] [facility], [address] [place] [address] COLON DISCHARGE INSTRUCTIONS Tiki Ramirez 
[id number] 
1950 Discomfort: 
Redness at IV site- apply warm compress to area; if redness or soreness persist- contact your physician There may be a slight amount of blood passed from the rectum Gaseous discomfort- walking, belching will help relieve any discomfort You may not operate a vehicle for 12 hours You may not engage in an occupation involving machinery or appliances for rest of today You may not drink alcoholic beverages for at least 12 hours Avoid making any critical decisions for at least 24 hour DIET: 
You may resume your regular diet  however -  remember your colon is empty and a heavy meal will produce gas. Avoid these foods:  vegetables, fried / greasy foods, carbonated drinks ACTIVITY: 
You may  resume your normal daily activities it is recommended that you spend the remainder of the day resting -  avoid any strenuous activity. CALL M.D. ANY SIGN OF: Increasing pain, nausea, vomiting Abdominal distension (swelling) New increased bleeding (oral or rectal) Fever (chills) Pain in chest area Bloody discharge from nose or mouth Shortness of breath Follow-up Instructions: 
 Call Dr. Roddie Curling for any questions or problems at [phone] ENDOSCOPY FINDINGS: 
 Your colonoscopy showed 14 polyps, which were moved today. We will contact you about the pathology results and when your next colonoscopy will be due (likely one year). You may resume your aspirin tomorrow. Your may resume your Plavix in 3 days. A benign appearing polyp was seen in the anorectal region. We will refer you to a colorectal surgeon for evaluation. Additionally, given the number of polyps you have, we will refer you for genetic testing for a colon polyposis syndrome. Telephone # [phone] Signed By: Quinn Corona MD   
 2/5/2018  10:01 AM 
  
 
DISCHARGE SUMMARY from Nurse The following personal items collected during your admission are returned to you:  
Dental Appliance: Dental Appliances: Partials, Uppers, Lowers Vision:   
Hearing Aid:   
Jewelry:   
Clothing:   
Other Valuables:   
Valuables sent to safe:   
 
 
 
  
  
  
Introducing [facility]! Fabricioedwina Ana introduces Doist patient portal. Now you can access parts of your medical record, email your doctor's office, and request medication refills online. 1. In your internet browser, go to [email or web address] 2. Click on the First Time User? Click Here link in the Sign In box. You will see the New Member Sign Up page. 3. Enter your Doist Access Code exactly as it appears below. You will not need to use this code after youve completed the sign-up process. If you do not sign up before the expiration date, you must request a new code. · Doist Access Code: [email or web address] Expires: 5/6/2018 10:20 AM 
 
4.  Enter the last four digits of your Social Security Number (xxxx) and Date of Birth (mm/dd/yyyy) as indicated and click Submit. You will be taken to the next sign-up page. 5. Create a Open Me ID. This will be your Open Me login ID and cannot be changed, so think of one that is secure and easy to remember. 6. Create a Open Me password. You can change your password at any time. 7. Enter your Password Reset Question and Answer. This can be used at a later time if you forget your password. 8. Enter your e-mail address. You will receive e-mail notification when new information is available in [address] 9. Click Sign Up. You can now view and download portions of your medical record. 10. Click the Download Summary menu link to download a portable copy of your medical information. If you have questions, please visit the Frequently Asked Questions section of the Open Me website. Remember, Open Me is NOT to be used for urgent needs. For medical emergencies, dial 911. Now available from your iPhone and Android! Providers Seen During Your Hospitalization Provider Specialty Primary office phone Evangelist Nicole MD Gastroenterology [phone] Your Primary Care Physician (PCP) Primary Care Physician Office Phone Office Fax [address] [facility] [phone] You are allergic to the following Allergen Reactions Heparin (Porcine) Other (comments) Was told when in the hospital that if he received heparin again that it would be deadly. Recent Documentation Height Weight BMI Smoking Status 1.778 m 79.8 kg 25.25 kg/m2 Former Smoker Emergency Contacts Name Discharge Info Relation Home Work Mobile Ofelia Vora DISCHARGE CAREGIVER [3] Spouse [3] [phone] Patient Belongings The following personal items are in your possession at time of discharge: 
  Dental Appliances: Partials, Uppers, Lowers Please provide this summary of care documentation to your next provider. Signatures-by signing, you are acknowledging that this After Visit Summary has been reviewed with you and you have received a copy. Patient Signature:  ____________________________________________________________ Date:  ____________________________________________________________  
  
Cherylle Cumins Provider Signature:  ____________________________________________________________ Date:  ____________________________________________________________

## 2018-02-05 NOTE — PROCEDURES
[address], [address] [place] [address]        Colonoscopy Operative Report    Richa Nelson  [id number]  1950      Procedure Type:   Colonoscopy with polypectomy (snare cautery), polypectomy (cold biopsy)     Indications:    Family history of coloretal cancer (screening only), Personal history of colon polyps (screening only)         Pre-operative Diagnosis: see indication above    Post-operative Diagnosis:  See findings below    :  Cam Terry. Valentine Nicole MD      Referring Provider: Divina Martinez MD      Sedation:  MAC anesthesia Propofol      Procedure Details:  After informed consent was obtained with all risks and benefits of procedure explained and preoperative exam completed, the patient was taken to the endoscopy suite and placed in the left lateral decubitus position. Upon sequential sedation as per above, a digital rectal exam was performed demonstrating internal hemorrhoids. The Olympus adult videocolonoscope  was inserted in the rectum and carefully advanced to the cecum, which was identified by the ileocecal valve and appendiceal orifice. The cecum was identified by the ileocecal valve and appendiceal orifice. The quality of preparation was good. The colonoscope was slowly withdrawn with careful evaluation between folds. Retroflexion in the rectum was completed . Findings:   Rectum: below the dentate line, a polypoid lesion was present with a background of internal hemorrhoids  Sigmoid: a single pedunculated 12 mm polyp was removed with hot snare, mild diverticulosis  Descending Colon: two sessile 8-10 mm polyps were removed with hot snare  Transverse Colon: normal  Ascending Colon: eleven sessile polyps 3 mm to 12 mm were removed with a combination of cold biopsy forceps and hot snare polypectomy. Cecum: normal  Terminal Ileum: not intubated      Specimen Removed:  1. Ascending colon polyps, 2. Descending colon polyps, 3.  Sigmoid colon polyp    Complications: None. EBL:  None. Impression:    1. Colon polyps - removed (14 in total)  2. Mild sigmoid diverticulosis  3. Polypoid lesion below dentate line (with internal hemorrhoids)    Recommendations:  1. Follow up surgical pathology  2. Repeat colonoscopy in one year  3. Colorectal surgery evaluation for lesion below dentate line  4. High fiber diet education  5. Restart ASA 81 tomorrow  6. Restart Plavix in three days  7. Referral to Medical Genetics for possible polyposis syndrome and family history of colon cancer    Signed By: Lillian Han.  Davion Payan MD     2/5/2018  10:04 AM

## 2018-02-05 NOTE — DISCHARGE INSTRUCTIONS
[address], [address] [place] [address]    COLON DISCHARGE INSTRUCTIONS    Jonny Bay  [id number]  1950    Discomfort:  Redness at IV site- apply warm compress to area; if redness or soreness persist- contact your physician  There may be a slight amount of blood passed from the rectum  Gaseous discomfort- walking, belching will help relieve any discomfort  You may not operate a vehicle for 12 hours  You may not engage in an occupation involving machinery or appliances for rest of today  You may not drink alcoholic beverages for at least 12 hours  Avoid making any critical decisions for at least 24 hour  DIET:  You may resume your regular diet - however -  remember your colon is empty and a heavy meal will produce gas. Avoid these foods:  vegetables, fried / greasy foods, carbonated drinks     ACTIVITY:  You may  resume your normal daily activities it is recommended that you spend the remainder of the day resting -  avoid any strenuous activity. CALL M.D. ANY SIGN OF:   Increasing pain, nausea, vomiting  Abdominal distension (swelling)  New increased bleeding (oral or rectal)  Fever (chills)  Pain in chest area  Bloody discharge from nose or mouth  Shortness of breath      Follow-up Instructions:   Call Dr. Joshua Balbuena for any questions or problems at [address] [place] [address] St:   Your colonoscopy showed 14 polyps, which were moved today. We will contact you about the pathology results and when your next colonoscopy will be due (likely one year). You may resume your aspirin tomorrow. Your may resume your Plavix in 3 days. A benign appearing polyp was seen in the anorectal region. We will refer you to a colorectal surgeon for evaluation. Additionally, given the number of polyps you have, we will refer you for genetic testing for a colon polyposis syndrome. Telephone # [phone]      Signed By: Kylie Rodríguez MD     2/5/2018  10:01 AM DISCHARGE SUMMARY from Nurse    The following personal items collected during your admission are returned to you:   Dental Appliance: Dental Appliances: Partials, Uppers, Lowers  Vision:    Hearing Aid:    Jewelry:    Clothing:    Other Valuables:    Valuables sent to safe:

## 2018-02-05 NOTE — PERIOP NOTES
Patient has been evaluated by anesthesia pre-procedure. Patient alert and oriented. Vital signs will not be charted by the Endoscopy nurse. All vitals, airway, and loc are monitored by anesthesia staff throughout procedure. Pt denies feeling weak with blood sugar of 80.

## 2018-02-05 NOTE — ANESTHESIA POSTPROCEDURE EVALUATION
Post-Anesthesia Evaluation and Assessment    Patient: Steve Shetty. MRN: [id number]  SSN: [id number]    YOB: 1950  Age: 74 y.o. Sex: male       Cardiovascular Function/Vital Signs  Visit Vitals    BP (!) 81/51    Pulse 62    Temp 36.4 °C (97.6 °F)    Resp 22    Ht 5' 10\" (1.778 m)    Wt 79.8 kg (176 lb)    SpO2 100%    BMI 25.25 kg/m2       Patient is status post MAC anesthesia for Procedure(s):  COLONOSCOPY  ENDOSCOPIC POLYPECTOMY. Nausea/Vomiting: None    Postoperative hydration reviewed and adequate. Pain:  Pain Scale 1: Numeric (0 - 10) (02/05/18 0859)  Pain Intensity 1: 0 (02/05/18 0859)   Managed    Neurological Status: At baseline    Mental Status and Level of Consciousness: Arousable    Pulmonary Status:   O2 Device: Nasal cannula (02/05/18 1003)   Adequate oxygenation and airway patent    Complications related to anesthesia: None    Post-anesthesia assessment completed.  No concerns    Signed By: Maggie Estrada MD     February 5, 2018

## 2018-02-05 NOTE — PROGRESS NOTES
Pt BS 77 after drink 2cans of Ginger Ale and 1pk of peanut butter cracker. Pt states feel fine. Informed Dr. Alfredito Bradshaw and ok to d/c pt to home.

## 2018-02-05 NOTE — ROUTINE PROCESS
Maria Eugenia Galloway.  1950  [id number]    Situation:  Verbal report received from: Pete Cherry RN  Procedure: Procedure(s):  COLONOSCOPY  ENDOSCOPIC POLYPECTOMY    Background:    Preoperative diagnosis: SCREENING COLONOSCOPY  Postoperative diagnosis: Colon polyps, anal rectal polypoid lesion in the presence of hemorrhoids     :  Dr. Hugo Rondon  Assistant(s): Endoscopy Technician-1: Narendra Snyder  Endoscopy RN-1: Laurie Mccoy RN    Specimens:   ID Type Source Tests Collected by Time Destination   1 : ascending colon polyps. 11 Preservative   Evangelist Rondon MD 2/5/2018 8592 Pathology   2 : descending colon polyps, 2 Preservative   Evangelist Rondon MD 2/5/2018 8226 Pathology   3 : sigmoid colon polyp Preservative   Evangelist Rondon MD 2/5/2018 0878 Pathology     H. Pylori  no    Assessment:  Intra-procedure medications   Anesthesia gave intra-procedure sedation and medications, see anesthesia flow sheet yes    Intravenous fluids: NS@ KVO     Vital signs stable     Abdominal assessment: round and soft     Recommendation:  Discharge patient per MD order.     Family or Friend   Permission to share finding with family or friend yes

## 2018-02-05 NOTE — H&P
[address]  174 [facility], [address] [place] [address]      History and Physical       NAME:  Sahil Cifuentes :   1950   MRN:   [id number]             History of Present Illness:  Patient is a 74 y.o. who is seen for history of colon polyps. Last colonoscopy was in  at which time an ascending colon polyp was seen but not removed in the setting of anticoagulation. PMH:  Past Medical History:   Diagnosis Date    Arthritis     hands/fingers    CAD (coronary artery disease)     involving native coronary artery of native heart without angina pectoris    Cardiomyopathy ([facility] 2017    A. Echo (17):  EF 5-10% with severe GHK,. Mildly dil LA. Mild TR. PASP 46./systemic cardiomyopathy    Chronic renal insufficiency 2017    Diabetes mellitus ([facility] 3/6/2017    Dyslipidemia 3/6/2017    A. FLP (17): Tot 120, , HDL 19, LDL 76 (no Rx).  H/O: GI bleed 3/6/2017    Heart failure (HCC)     chronic systolic CHF    Hepatitis C 3/6/2017    Ill-defined condition     hypokalemia    Ill-defined condition     flu 2018     Paroxysmal atrial fibrillation ([facility] 3/6/2017    [facility]    A. RICKY (3/20/17): Right 0.58, Left 0.56. PSH:  Past Surgical History:   Procedure Laterality Date    COLONOSCOPY N/A 2017    COLONOSCOPY performed by Rosalva Terry. Cheli Frances MD at [address] HX COLONOSCOPY      [facility] 2017    HX GI      colonoscopy x 3 - last 2017 - polyp    HX HEART CATHETERIZATION      3 heart stents       Allergies: Allergies   Allergen Reactions    Heparin (Porcine) Other (comments)     Was told when in the hospital that if he received heparin again that it would be deadly. Home Medications:  Prior to Admission Medications   Prescriptions Last Dose Informant Patient Reported? Taking? DULoxetine (CYMBALTA) 30 mg capsule 2018 at Unknown time  No Yes   Sig: Take 1 Cap by mouth daily. Insulin Syringes, Disposable, 1 mL syrg 2018 at Unknown time  No Yes   Sig: Pt to take 10 units w/ breakfast and 8 units w/ dinner   acetaminophen (TYLENOL EXTRA STRENGTH) 500 mg tablet 2/3/2018  Yes Yes   Sig: Take 1,000 mg by mouth every six (6) hours as needed for Pain. Indications: BACK PAIN   amiodarone (CORDARONE) 200 mg tablet 2018 at Unknown time  No Yes   Sig: Take 1 Tab by mouth daily. Indications: PREVENTION OF RECURRENT ATRIAL FIBRILLATION   aspirin delayed-release 81 mg tablet 2018  No Yes   Sig: Take 1 Tab by mouth daily. atorvastatin (LIPITOR) 10 mg tablet   No Yes   Sig: TAKE ONE TABLET BY MOUTH ONCE DAILY   bumetanide (BUMEX) 1 mg tablet Not Taking at Unknown time  Yes No   Sig: Take 0.5 mg by mouth as needed (fluid retention). carvedilol (COREG) 3.125 mg tablet 2018 at Unknown time  No Yes   Sig: TAKE ONE TABLET BY MOUTH TWICE DAILY WITH MEALS   clopidogrel (PLAVIX) 75 mg tab 2018 at Unknown time  No No   Sig: Take 1 Tab by mouth daily. insulin NPH/insulin regular (NOVOLIN 70/30) 100 unit/mL (70-30) injection 2018 at Unknown time  Yes Yes   Sig: 10 Units by SubCUTAneous route Daily (before breakfast). insulin NPH/insulin regular (NOVOLIN 70/30) 100 unit/mL (70-30) injection   Yes Yes   Si Units by SubCUTAneous route Daily (before dinner). patiromer calcium sorbitex (VELTASSA) 8.4 gram powder 2018 at Unknown time  No Yes   Sig: Take 8.4 g by mouth daily. sacubitril-valsartan (ENTRESTO) 97 mg/103 mg tablet 2018 at Unknown time  No Yes   Sig: Take 1 Tab by mouth two (2) times a day. spironolactone (ALDACTONE) 25 mg tablet 2018 at Unknown time  No Yes   Sig: Take 1 Tab by mouth daily.       Facility-Administered Medications: None       Hospital Medications:  Current Facility-Administered Medications   Medication Dose Route Frequency    0.9% sodium chloride infusion  100 mL/hr IntraVENous CONTINUOUS    sodium chloride (NS) flush 5-10 mL  5-10 mL IntraVENous Q8H    sodium chloride (NS) flush 5-10 mL  5-10 mL IntraVENous PRN    midazolam (VERSED) injection 0.25-10 mg  0.25-10 mg IntraVENous Multiple    fentaNYL citrate (PF) injection 100 mcg  100 mcg IntraVENous Multiple    naloxone (NARCAN) injection 0.4 mg  0.4 mg IntraVENous Multiple    flumazenil (ROMAZICON) 0.1 mg/mL injection 0.2 mg  0.2 mg IntraVENous Multiple    simethicone (MYLICON) 40PT/7.6LI oral drops 80 mg  1.2 mL Oral Multiple    diphenhydrAMINE (BENADRYL) injection 50 mg  50 mg IntraVENous ONCE    atropine injection 0.5 mg  0.5 mg IntraVENous ONCE PRN    EPINEPHrine (ADRENALIN) 0.1 mg/mL syringe 1 mg  1 mg Endoscopically ONCE PRN     Facility-Administered Medications Ordered in Other Encounters   Medication Dose Route Frequency    0.9% sodium chloride infusion   IntraVENous CONTINUOUS       Social History:  Social History   Substance Use Topics    Smoking status: Former Smoker     Packs/day: 2.00     Years: 45.00     Quit date: 2017    Smokeless tobacco: Never Used    Alcohol use Yes      Comment: 2 beers in a year/very rare       Family History:  Family History   Problem Relation Age of Onset    Hypertension Mother     Cancer Father     Colon Cancer Father     Other Sister      born totally handicapped/epileptic    Kidney Disease Sister       from renal shutdown    Heart Disease Brother     Other Brother      ?pancreatic cancer or ?pancreatitis    Cancer Maternal Uncle      toes and spread    Cancer Paternal Uncle      stomach    Heart Attack Maternal Grandfather 47    Cancer Paternal Grandfather      bone    Cancer Maternal Uncle      stomach    Cancer Maternal Uncle      lung             Review of Systems:      Constitutional: negative fever, negative chills, negative weight loss  Eyes:   negative visual changes  ENT:   negative sore throat, tongue or lip swelling  Respiratory:  negative cough, negative dyspnea  Cards:  negative for chest pain, palpitations, lower extremity edema  GI:   See HPI  :  negative for frequency, dysuria  Integument:  negative for rash and pruritus  Heme:  negative for easy bruising and gum/nose bleeding  Musculoskel: negative for myalgias,  back pain and muscle weakness  Neuro: negative for headaches, dizziness, vertigo  Psych:  negative for feelings of anxiety, depression       Objective:   Patient Vitals for the past 8 hrs:   BP Temp Pulse SpO2 Height Weight   02/05/18 0859 156/77 97.6 °F (36.4 °C) 75 97 % - -   02/05/18 0700 - - - - 5' 10\" (1.778 m) 79.8 kg (176 lb)             EXAM:     NEURO-a&o   HEENT-wnl   LUNGS-clear    COR-regular rate and rhythym     ABD-soft , no tenderness, no rebound, good bs     EXT-no edema     Data Review     No results for input(s): WBC, HGB, HCT, PLT, HGBEXT, HCTEXT, PLTEXT in the last 72 hours. No results for input(s): NA, K, CL, CO2, BUN, CREA, GLU, PHOS, CA in the last 72 hours. No results for input(s): SGOT, GPT, AP, TBIL, TP, ALB, GLOB, GGT, AML, LPSE in the last 72 hours. No lab exists for component: AMYP, HLPSE  No results for input(s): INR, PTP, APTT in the last 72 hours. No lab exists for component: INREXT       Assessment:   · History of colon polyps     Patient Active Problem List   Diagnosis Code    Ischemic cardiomyopathy I25.5    Paroxysmal atrial fibrillation (HCC) I48.0    H/O: GI bleed Z87.19    Hepatitis C B19.20    Chronic renal insufficiency N18.9    Dyslipidemia E78.5    Coronary artery disease involving native heart without angina pectoris R32.36    Systolic CHF, chronic (HCC) I50.22    Type 2 diabetes mellitus with complication (HCC) G48.4    Peripheral vascular disease (HCC) I73.9    ICD (implantable cardioverter-defibrillator) in place Z95.810    Type 2 diabetes mellitus with nephropathy ([facility] E11.21    Hypokalemia E87.6     Plan:   · Endoscopic procedure with MAC     Signed By: Jeanna Smith.  Aislinn Lunsford MD     2/5/2018  9:13 AM

## 2018-02-23 RX ORDER — AMIODARONE HYDROCHLORIDE 200 MG/1
TABLET ORAL
Qty: 60 TAB | Refills: 4 | OUTPATIENT
Start: 2018-02-23

## 2018-02-26 RX ORDER — AMIODARONE HYDROCHLORIDE 200 MG/1
200 TABLET ORAL DAILY
Qty: 30 TAB | Refills: 6 | Status: SHIPPED | OUTPATIENT
Start: 2018-02-26 | End: 2019-04-29

## 2018-02-28 ENCOUNTER — DOCUMENTATION ONLY (OUTPATIENT)
Dept: CARDIOLOGY CLINIC | Age: 68
End: 2018-02-28

## 2018-02-28 NOTE — PROGRESS NOTES
Called the patient back about completing patient assistance paperwork. He shared he will stop by tomorrow morning with his income verifications to complete the paperwork.     Brianna Jimenez, MSW, LCSW    Clinical    [phone]   [employer] ® ACP Facilitator

## 2018-03-01 ENCOUNTER — DOCUMENTATION ONLY (OUTPATIENT)
Dept: FAMILY MEDICINE CLINIC | Age: 68
End: 2018-03-01

## 2018-03-01 ENCOUNTER — DOCUMENTATION ONLY (OUTPATIENT)
Dept: CARDIOLOGY CLINIC | Age: 68
End: 2018-03-01

## 2018-03-01 NOTE — PROGRESS NOTES
Patient brought in [address] forms to be completed for Cymbalta.     Please call when ready   [phone]    Patient wants to  and submit

## 2018-03-01 NOTE — PROGRESS NOTES
met with the patient today to discuss his applications for entresto patient assistance and cymbalta. Shared Laron Mary would like his PCP to prescribe the cymbalta- provided him with the patient assistance program application to take to his PCP. He completed part of the entresto patient assist application - shared he was uncertain about the household income information. He will take the paperwork home to complete and return it to . He shared he has enough of the medication to make it through until he returns the paperwork.     Fredi Otero, MSW, LCSW    Clinical    [phone]   [employer] ® ACP Facilitator

## 2018-03-05 ENCOUNTER — HOSPITAL ENCOUNTER (OUTPATIENT)
Dept: LAB | Age: 68
Discharge: HOME | End: 2018-03-05
Payer: MEDICARE

## 2018-03-05 ENCOUNTER — OFFICE VISIT (OUTPATIENT)
Dept: FAMILY MEDICINE CLINIC | Age: 68
End: 2018-03-05

## 2018-03-05 VITALS
WEIGHT: 190.1 LBS | HEART RATE: 66 BPM | RESPIRATION RATE: 16 BRPM | OXYGEN SATURATION: 97 % | SYSTOLIC BLOOD PRESSURE: 123 MMHG | BODY MASS INDEX: 27.22 KG/M2 | HEIGHT: 70 IN | DIASTOLIC BLOOD PRESSURE: 72 MMHG | TEMPERATURE: 97.3 F

## 2018-03-05 DIAGNOSIS — E11.8 TYPE 2 DIABETES MELLITUS WITH COMPLICATION, UNSPECIFIED LONG TERM INSULIN USE STATUS: ICD-10-CM

## 2018-03-05 DIAGNOSIS — Z71.89 ADVANCE CARE PLANNING: ICD-10-CM

## 2018-03-05 DIAGNOSIS — E78.5 DYSLIPIDEMIA: ICD-10-CM

## 2018-03-05 DIAGNOSIS — K63.5 POLYP OF COLON, UNSPECIFIED PART OF COLON, UNSPECIFIED TYPE: ICD-10-CM

## 2018-03-05 DIAGNOSIS — E11.21 TYPE 2 DIABETES MELLITUS WITH NEPHROPATHY (HCC): ICD-10-CM

## 2018-03-05 DIAGNOSIS — I73.9 PERIPHERAL VASCULAR DISEASE (HCC): ICD-10-CM

## 2018-03-05 DIAGNOSIS — Z00.00 ROUTINE GENERAL MEDICAL EXAMINATION AT A HEALTH CARE FACILITY: Primary | ICD-10-CM

## 2018-03-05 DIAGNOSIS — I48.0 PAROXYSMAL ATRIAL FIBRILLATION (HCC): ICD-10-CM

## 2018-03-05 DIAGNOSIS — I50.22 SYSTOLIC CHF, CHRONIC (HCC): ICD-10-CM

## 2018-03-05 PROCEDURE — 80061 LIPID PANEL: CPT

## 2018-03-05 PROCEDURE — 83036 HEMOGLOBIN GLYCOSYLATED A1C: CPT

## 2018-03-05 PROCEDURE — 80053 COMPREHEN METABOLIC PANEL: CPT

## 2018-03-05 NOTE — MR AVS SNAPSHOT
[address] [address] 
[phone] Patient: Grey Song. MRN: [id number] Visit Information Date & Time Provider Department Dept. Phone Encounter #  
 3/5/2018  9:30 AM Katia Hobbs MD [address] [facility] [phone] [id number] Follow-up Instructions Return in about 3 months (around 6/5/2018). Your Appointments 3/9/2018  9:40 AM  
ESTABLISHED PATIENT with Gabi Keating MD  
[facility] ([address]) Appt Note: fu per shaan sancehz kna  
 [address] [facility] [address] [facility] [place] [address]  
[address] [place] [address]  
  
    
 3/13/2018  9:30 AM  
Follow Up with Christiano Castro [facility] [address] [facility] ([address]) [place] [address]  
  
    
 7/25/2018 10:00 AM  
ESTABLISHED PATIENT with Klelie Sanchez MD  
[facility] ([facility]) Appt Note: annual  
 [address] [facility] [address] [facility]  
[phone]  
  
   
 [address] [address]  
  
    
 7/25/2018 10:00 AM  
PACEMAKER with ALYSSA LARIOS  
[facility] ([facility]) Appt Note: jen sci ICD/stf/study  sherin [address] [facility] [address] [facility] [address]  
[phone]  
  
   
 [address] [facility] [address] [place] [address]  
  
    
  
 3/22/2018 10:45 AM  
REMOTE OFFICE VISIT with Oren Landry [facility] ([facility] SCHEDUL[facility]) Appt Note: Lat ICD/stf/study [address] [facility] [address] [facility]  
[address] [address] [address] Upcoming Health Maintenance Date Due  
 FOOT EXAM Q1 11/23/1962 EYE EXAM RETINAL OR DILATED Q1 11/23/1962 DTaP/Tdap/Td series (1 - Tdap) 11/23/1973 ZOSTER VACCINE AGE 60> 9/23/2012 GLAUCOMA SCREENING Q2Y 11/23/2017 MEDICARE YEARLY EXAM 11/23/2017 HEMOGLOBIN A1C Q6M 6/4/2018 Pneumococcal 65+ Low/Medium Risk (2 of 2 - PPSV23) 11/15/2018 MICROALBUMIN Q1 12/4/2018 LIPID PANEL Q1 12/4/2018 COLONOSCOPY 2/5/2019 Allergies as of 3/5/2018  Review Complete On: 3/5/2018 By: Madison Acharya MD  
  
 Severity Noted Reaction Type Reactions Heparin (Porcine) High 02/07/2017    Other (comments) Was told when in the hospital that if he received heparin again that it would be deadly. Current Immunizations  Reviewed on 12/4/2017 Name Date Influenza Nasal Vaccine 11/15/2017 Pneumococcal Conjugate (PCV-13) 11/15/2017 Not reviewed this visit You Were Diagnosed With   
  
 Codes Comments Routine general medical examination at a health care facility    -  Primary ICD-10-CM: Z00.00 ICD-9-CM: V70.0 Type 2 diabetes mellitus with nephropathy (HCC)     ICD-10-CM: E11.21 
ICD-9-CM: 250.40, 583.81 Type 2 diabetes mellitus with complication, unspecified long term insulin use status (HCC)     ICD-10-CM: E11.8 ICD-9-CM: 250.90 Dyslipidemia     ICD-10-CM: E78.5 ICD-9-CM: 272.4 Systolic CHF, chronic (HCC)     ICD-10-CM: I50.22 ICD-9-CM: 428.22, 428.0 Peripheral vascular disease ([facility] [address]     ICD-10-CM: I73.9 ICD-9-CM: 443.9 Paroxysmal atrial fibrillation (HCC)     ICD-10-CM: I48.0 ICD-9-CM: 427.31 Polyp of colon, unspecified part of colon, unspecified type     ICD-10-CM: K63.5 ICD-9-CM: 211.3 Advance care planning     ICD-10-CM: Z71.89 ICD-9-CM: V65.49 Vitals BP Pulse Temp Resp Height(growth percentile) Weight(growth percentile) 123/72 66 97.3 °F (36.3 °C) (Oral) 16 5' 10\" (1.778 m) 190 lb 1.6 oz (86.2 kg) SpO2 BMI Smoking Status 97% 27.28 kg/m2 Former Smoker Vitals History BMI and BSA Data Body Mass Index Body Surface Area  
 27.28 kg/m 2 2.06 m 2 Preferred Pharmacy Pharmacy Name Phone [facility] [phone] Your Updated Medication List  
  
   
This list is accurate as of 3/5/18 10:04 AM.  Always use your most recent med list.  
  
  
  
  
 amiodarone 200 mg tablet Commonly known as:  CORDARONE Take 1 Tab by mouth daily. Indications: PREVENTION OF RECURRENT ATRIAL FIBRILLATION  
  
 aspirin delayed-release 81 mg tablet Take 1 Tab by mouth daily. atorvastatin 10 mg tablet Commonly known as:  LIPITOR  
TAKE ONE TABLET BY MOUTH ONCE DAILY  
  
 bumetanide 1 mg tablet Commonly known as:  Theodora Grange Take 0.5 mg by mouth as needed (fluid retention). carvedilol 3.125 mg tablet Commonly known as:  COREG  
TAKE ONE TABLET BY MOUTH TWICE DAILY WITH MEALS  
  
 clopidogrel 75 mg Tab Commonly known as:  PLAVIX Take 1 Tab by mouth daily. DULoxetine 30 mg capsule Commonly known as:  CYMBALTA Take 1 Cap by mouth daily. Insulin Syringes (Disposable) 1 mL Syrg Pt to take 10 units w/ breakfast and 8 units w/ dinner * NovoLIN 70/30 U-100 Insulin 100 unit/mL (70-30) injection Generic drug:  insulin NPH/insulin regular 10 Units by SubCUTAneous route Daily (before breakfast). * NovoLIN 70/30 U-100 Insulin 100 unit/mL (70-30) injection Generic drug:  insulin NPH/insulin regular  
8 Units by SubCUTAneous route Daily (before dinner). patiromer calcium sorbitex 8.4 gram powder Commonly known as:  VELTASSA Take 8.4 g by mouth daily. sacubitril-valsartan  mg tablet Commonly known as:  ENTRESTO Take 1 Tab by mouth two (2) times a day. spironolactone 25 mg tablet Commonly known as:  ALDACTONE Take 1 Tab by mouth daily. TYLENOL EXTRA STRENGTH 500 mg tablet Generic drug:  acetaminophen Take 1,000 mg by mouth every six (6) hours as needed for Pain. Indications: BACK PAIN  
  
 * Notice: This list has 2 medication(s) that are the same as other medications prescribed for you. Read the directions carefully, and ask your doctor or other care provider to review them with you. We Performed the Following HEMOGLOBIN A1C WITH EAG [82646 CPT(R)] LIPID PANEL [97116 CPT(R)] METABOLIC PANEL, COMPREHENSIVE [47719 CPT(R)] REFERRAL TO OPHTHALMOLOGY [REF57 Custom] Follow-up Instructions Return in about 3 months (around 6/5/2018). To-Do List   
 03/08/2018 2:00 PM  
  Appointment with [facility] PAT EXAM RM 2 at [address] [facility] ([phone]) Referral Information Referral ID Referred By Referred To  
  
 [id number] MICHAEL BALLARD Not Available Visits Status Start Date End Date 1 New Request 3/5/18 3/5/19 If your referral has a status of pending review or denied, additional information will be sent to support the outcome of this decision. Introducing [facility]! Kishan Simpson introduces [employer] patient portal. Now you can access parts of your medical record, email your doctor's office, and request medication refills online. 1. In your internet browser, go to [email or web address] 2. Click on the First Time User? Click Here link in the Sign In box. You will see the New Member Sign Up page. 3. Enter your [employer] Access Code exactly as it appears below. You will not need to use this code after youve completed the sign-up process. If you do not sign up before the expiration date, you must request a new code. · [employer] Access Code: [email or web address] Expires: 5/6/2018 10:20 AM 
 
4. Enter the last four digits of your Social Security Number (xxxx) and Date of Birth (mm/dd/yyyy) as indicated and click Submit. You will be taken to the next sign-up page. 5. Create a [employer] ID. This will be your [employer] login ID and cannot be changed, so think of one that is secure and easy to remember. 6. Create a [employer] password. You can change your password at any time. 7. Enter your Password Reset Question and Answer. This can be used at a later time if you forget your password. 8. Enter your e-mail address. You will receive e-mail notification when new information is available in [address] 9. Click Sign Up. You can now view and download portions of your medical record. 10. Click the Download Summary menu link to download a portable copy of your medical information. If you have questions, please visit the Frequently Asked Questions section of the [employer] website. Remember, [employer] is NOT to be used for urgent needs. For medical emergencies, dial 911. Now available from your iPhone and Android! Please provide this summary of care documentation to your next provider. Your primary care clinician is listed as MICHAEL BALLARD. If you have any questions after today's visit, please call [phone].

## 2018-03-05 NOTE — PROGRESS NOTES
Chief Complaint   Patient presents with    Irregular Heart Beat    Diabetes     1. Have you been to the ER, urgent care clinic since your last visit? Hospitalized since your last visit? No    2. Have you seen or consulted any other health care providers outside of the [address] [place] [address] since your last visit? Include any pap smears or colon screening. No       Barbarasandeep Gonzalez.  3/5/2018  Provider:   Danny:  Diabetes Report Card   1) Have you seen the eye doctor in past year?no    2) How would you  rate your Diabetic Diet? GOOD   3) How well do you take care of your feet? WELL   4) Do you keep your Primary Care Follow Up Appts? yes    5) Do you know your A1C goal?yes    6) Do you take your medications daily? yes    7) Do you check your blood sugars? yes    8) Have you gained weight?yes       9) Do you follow an exercise program?no    10) Can you do better?yes      Lab Results   Component Value Date/Time    Cholesterol, total 171 12/04/2017 10:17 AM    HDL Cholesterol 37 (L) 12/04/2017 10:17 AM    LDL, calculated 101 (H) 12/04/2017 10:17 AM    Triglyceride 165 (H) 12/04/2017 10:17 AM    CHOL/HDL Ratio 6.3 (H) 01/19/2017 03:50 AM     Lab Results   Component Value Date/Time    Hemoglobin A1c 6.2 (H) 12/04/2017 10:17 AM    Hemoglobin A1c 13.5 (H) 01/19/2017 05:05 PM    Hemoglobin A1c 13.4 (H) 01/18/2017 11:11 PM    Glucose CANCELED 01/11/2018 02:00 PM    Glucose (POC) 77 02/05/2018 10:30 AM    Microalb/Creat ratio (ug/mg creat.) 106.5 (H) 12/04/2017 10:17 AM    LDL, calculated 101 (H) 12/04/2017 10:17 AM    Creatinine 1.77 (H) 01/03/2018 03:38 PM        Medicare Wellness Exam:    Chief Complaint   Patient presents with    Irregular Heart Beat    Diabetes     he is a 74y.o. year old male who presents for evaluation for their Medicare Wellness Visit.     Fall Screen is completed and assessed=yes  Depression Screen is completed and assessed=yes  Medication list reviewed and adjusted for accuracy=yes  Immunizations reviewed and updated=yes  Health/Preventative Screenings reviewed and updated=yes  ADL Functions reviewed=yes    Patient Active Problem List    Diagnosis    Polyp of colon    Advance care planning    Type 2 diabetes mellitus with nephropathy ([facility]    Hypokalemia    ICD (implantable cardioverter-defibrillator) in place    Peripheral vascular disease ([facility]     A. RICKY (3/20/17): Right 0.58, Left 0.56.  Coronary artery disease involving native heart without angina pectoris    Systolic CHF, chronic (HCC)    Type 2 diabetes mellitus with complication (HCC)    Paroxysmal atrial fibrillation (HCC)    H/O: GI bleed    Hepatitis C    Chronic renal insufficiency    Dyslipidemia     A. FLP (1/19/17): Tot 120, , HDL 19, LDL 76 (no Rx).  Ischemic cardiomyopathy     A.  Echo (1/19/17):  EF 5-10% with severe GHK,. Mildly dil LA. Mild TR. PASP 46. B. Cath (1/20/17):  LM ost70. LAD m50. D1 80. LCx d70; OM1 99, OM2 90. RCA p100. No AVG. PAP  53/27/36. C.  PCI (2/9/17): pRCA 2.5x38 Xience + 2.75x12 Xience. ostLM 4.0x12 Xience post-dil with 4.5x12 NC. D.  Echo (2/13/17):  EF 10% with severe GHK, PSM. Dil LA. Mod MR. Mild to mod TR. Left pleural effusion. E.  Echo (5/15/17): EF 25% with severe GHK and basl-mid inf AK, mildly dil LV, DD. Sev dil LA. Mod MR. Mild TR. PASP 35. F.  ICD (6/12/17, Anup): [employer]. Reviewed PmHx, RxHx, FmHx, SocHx, AllgHx and updated and dated in the chart.     Review of Systems - negative except as listed above in the HPI    Objective:     Vitals:    03/05/18 0936   BP: 123/72   Pulse: 66   Resp: 16   Temp: 97.3 °F (36.3 °C)   TempSrc: Oral   SpO2: 97%   Weight: 190 lb 1.6 oz (86.2 kg)   Height: 5' 10\" (1.778 m)     Physical Examination: General appearance - alert, well appearing, and in no distress  Neck - supple, no significant adenopathy  Chest - clear to auscultation, no wheezes, rales or rhonchi, symmetric air entry  Heart - normal rate, regular rhythm, normal S1, S2, no murmurs, rubs, clicks or gallops  Abdomen - soft, nontender, nondistended, no masses or organomegaly    Assessment/ Plan:   Diagnoses and all orders for this visit:    1. Routine general medical examination at a health care facility  -see below    2. Type 2 diabetes mellitus with nephropathy (HCC)  -     LIPID PANEL  -     METABOLIC PANEL, COMPREHENSIVE  -     HEMOGLOBIN A1C WITH EAG  -     REFERRAL TO OPHTHALMOLOGY    3. Type 2 diabetes mellitus with complication, unspecified long term insulin use status (HCC)  -     LIPID PANEL  -     METABOLIC PANEL, COMPREHENSIVE  -     HEMOGLOBIN A1C WITH EAG  -     REFERRAL TO OPHTHALMOLOGY    4. Dyslipidemia  -     LIPID PANEL  -     METABOLIC PANEL, COMPREHENSIVE    5. Systolic CHF, chronic (HCC)  -stble    6. Peripheral vascular disease ([facility]  -no sxs    7. Paroxysmal atrial fibrillation (HCC)  -occ palp    8. Polyp of colon, unspecified part of colon, unspecified type  -just completed scope    9.  Advance care planning  -I discussed with patient living will and gave a legal form for patient to fill out and bring back to the office.         -Pain evaluation performed in office  -Cognitive Screen performed in office  -Depression Screen, Fall risks (by up and go test)  and ADL functionality were addressed  -Medication list updated and reviewed for any changes   -A comprehensive review of medical issues and a plan was formulated  -End of life planning was addressed with pt   -Health Screenings for preventions were addressed and a plan was formulated  -Shingles Vaccine was recommended  -Discussed with patient cancer risk factors and appropriate screenings for age  -Patient evaluated for colonoscopy and referred if needed per screeing criteria  -Labs from previous visits were discussed with patient   -Discussed with patient diet and exercise and formulated a plan as needed  -An Advanced care plan was developed with the patient.  -Alcohol screening performed and was negative    -Follow-up Disposition: Not on File    I have discussed the diagnosis with the patient and the intended plan as seen in the above orders. The patient understands and agrees with the plan. The patient has received an after-visit summary and questions were answered concerning future plans. Medication Side Effects and Warnings were discussed with patien  Patient Labs were reviewed and or requested  Patient Past Records were reviewed and or requested    There are no Patient Instructions on file for this visit.       Dayanna Gifford M.D.

## 2018-03-06 ENCOUNTER — DOCUMENTATION ONLY (OUTPATIENT)
Dept: FAMILY MEDICINE CLINIC | Age: 68
End: 2018-03-06

## 2018-03-06 DIAGNOSIS — E11.40 CONTROLLED TYPE 2 DIABETES MELLITUS WITH DIABETIC NEUROPATHY, WITHOUT LONG-TERM CURRENT USE OF INSULIN (HCC): ICD-10-CM

## 2018-03-06 DIAGNOSIS — I25.5 ISCHEMIC CARDIOMYOPATHY: ICD-10-CM

## 2018-03-06 DIAGNOSIS — I50.41 ACUTE COMBINED SYSTOLIC AND DIASTOLIC ACC/AHA STAGE C CONGESTIVE HEART FAILURE (HCC): ICD-10-CM

## 2018-03-06 DIAGNOSIS — K92.2 LOWER GI BLEEDING: ICD-10-CM

## 2018-03-06 DIAGNOSIS — E13.40 NEUROPATHY DUE TO SECONDARY DIABETES (HCC): ICD-10-CM

## 2018-03-06 LAB
ALBUMIN SERPL-MCNC: 4.2 G/DL (ref 3.6–4.8)
ALBUMIN/GLOB SERPL: 1.6 {RATIO} (ref 1.2–2.2)
ALP SERPL-CCNC: 59 IU/L (ref 39–117)
ALT SERPL-CCNC: 25 IU/L (ref 0–44)
AST SERPL-CCNC: 24 IU/L (ref 0–40)
BILIRUB SERPL-MCNC: 0.4 MG/DL (ref 0–1.2)
BUN SERPL-MCNC: 39 MG/DL (ref 8–27)
BUN/CREAT SERPL: 20 (ref 10–24)
CALCIUM SERPL-MCNC: 9.3 MG/DL (ref 8.6–10.2)
CHLORIDE SERPL-SCNC: 101 MMOL/L (ref 96–106)
CHOLEST SERPL-MCNC: 188 MG/DL (ref 100–199)
CO2 SERPL-SCNC: 24 MMOL/L (ref 18–29)
CREAT SERPL-MCNC: 2 MG/DL (ref 0.76–1.27)
EST. AVERAGE GLUCOSE BLD GHB EST-MCNC: 137 MG/DL
GFR SERPLBLD CREATININE-BSD FMLA CKD-EPI: 34 ML/MIN/1.73
GFR SERPLBLD CREATININE-BSD FMLA CKD-EPI: 39 ML/MIN/1.73
GLOBULIN SER CALC-MCNC: 2.7 G/DL (ref 1.5–4.5)
GLUCOSE SERPL-MCNC: 116 MG/DL (ref 65–99)
HBA1C MFR BLD: 6.4 % (ref 4.8–5.6)
HDLC SERPL-MCNC: 39 MG/DL
INTERPRETATION, 910389: NORMAL
INTERPRETATION: NORMAL
LDLC SERPL CALC-MCNC: 122 MG/DL (ref 0–99)
Lab: NORMAL
PDF IMAGE, 910387: NORMAL
POTASSIUM SERPL-SCNC: 5.2 MMOL/L (ref 3.5–5.2)
PROT SERPL-MCNC: 6.9 G/DL (ref 6–8.5)
SODIUM SERPL-SCNC: 138 MMOL/L (ref 134–144)
TRIGL SERPL-MCNC: 137 MG/DL (ref 0–149)
VLDLC SERPL CALC-MCNC: 27 MG/DL (ref 5–40)

## 2018-03-06 RX ORDER — DULOXETIN HYDROCHLORIDE 30 MG/1
30 CAPSULE, DELAYED RELEASE ORAL DAILY
Qty: 90 CAP | Refills: 3 | Status: SHIPPED | OUTPATIENT
Start: 2018-03-06 | End: 2019-01-08

## 2018-03-06 NOTE — PROGRESS NOTES
Called and spoke to patient via mobile number([phone]) Patient ,(Estil) States understanding regarding lab results per Dr Carina Murillo: DM at goal, the cholesterol is mildly increased--States willwork on diet changes and keep f/u appt. Yazmin Perez

## 2018-03-08 ENCOUNTER — HOSPITAL ENCOUNTER (OUTPATIENT)
Dept: GENERAL RADIOLOGY | Age: 68
Discharge: HOME | End: 2018-03-08
Attending: COLON & RECTAL SURGERY
Payer: MEDICARE

## 2018-03-08 ENCOUNTER — HOSPITAL ENCOUNTER (OUTPATIENT)
Dept: PREADMISSION TESTING | Age: 68
Discharge: HOME | End: 2018-03-08
Payer: MEDICARE

## 2018-03-08 VITALS
HEART RATE: 74 BPM | WEIGHT: 189 LBS | SYSTOLIC BLOOD PRESSURE: 163 MMHG | TEMPERATURE: 97.9 F | BODY MASS INDEX: 27.06 KG/M2 | HEIGHT: 70 IN | DIASTOLIC BLOOD PRESSURE: 78 MMHG

## 2018-03-08 LAB
ANION GAP SERPL CALC-SCNC: 6 MMOL/L (ref 5–15)
BUN SERPL-MCNC: 28 MG/DL (ref 6–20)
BUN/CREAT SERPL: 15 (ref 12–20)
CALCIUM SERPL-MCNC: 9.1 MG/DL (ref 8.5–10.1)
CHLORIDE SERPL-SCNC: 106 MMOL/L (ref 97–108)
CO2 SERPL-SCNC: 28 MMOL/L (ref 21–32)
CREAT SERPL-MCNC: 1.85 MG/DL (ref 0.7–1.3)
ERYTHROCYTE [DISTWIDTH] IN BLOOD BY AUTOMATED COUNT: 15.1 % (ref 11.5–14.5)
GLUCOSE SERPL-MCNC: 89 MG/DL (ref 65–100)
HCT VFR BLD AUTO: 42 % (ref 36.6–50.3)
HGB BLD-MCNC: 13.8 G/DL (ref 12.1–17)
MCH RBC QN AUTO: 28.6 PG (ref 26–34)
MCHC RBC AUTO-ENTMCNC: 32.9 G/DL (ref 30–36.5)
MCV RBC AUTO: 87 FL (ref 80–99)
NRBC # BLD: 0 K/UL (ref 0–0.01)
NRBC BLD-RTO: 0 PER 100 WBC
PLATELET # BLD AUTO: 161 K/UL (ref 150–400)
PMV BLD AUTO: 10.3 FL (ref 8.9–12.9)
POTASSIUM SERPL-SCNC: 4.8 MMOL/L (ref 3.5–5.1)
RBC # BLD AUTO: 4.83 M/UL (ref 4.1–5.7)
SODIUM SERPL-SCNC: 140 MMOL/L (ref 136–145)
WBC # BLD AUTO: 7.8 K/UL (ref 4.1–11.1)

## 2018-03-08 PROCEDURE — 36415 COLL VENOUS BLD VENIPUNCTURE: CPT | Performed by: COLON & RECTAL SURGERY

## 2018-03-08 PROCEDURE — 71046 X-RAY EXAM CHEST 2 VIEWS: CPT

## 2018-03-08 PROCEDURE — 80048 BASIC METABOLIC PNL TOTAL CA: CPT | Performed by: COLON & RECTAL SURGERY

## 2018-03-08 PROCEDURE — 85027 COMPLETE CBC AUTOMATED: CPT | Performed by: COLON & RECTAL SURGERY

## 2018-03-08 NOTE — PERIOP NOTES
PAT instructions reviewed with patient and given the opportunity to ask questions. Patient given surgical site information FAQs handout and reviewed. Notified Augusto Weber office re: patient has AICD.

## 2018-03-09 ENCOUNTER — TELEPHONE (OUTPATIENT)
Dept: CARDIOLOGY CLINIC | Age: 68
End: 2018-03-09

## 2018-03-09 ENCOUNTER — OFFICE VISIT (OUTPATIENT)
Dept: CARDIOLOGY CLINIC | Age: 68
End: 2018-03-09

## 2018-03-09 VITALS
OXYGEN SATURATION: 99 % | BODY MASS INDEX: 27.35 KG/M2 | HEART RATE: 66 BPM | SYSTOLIC BLOOD PRESSURE: 150 MMHG | RESPIRATION RATE: 16 BRPM | DIASTOLIC BLOOD PRESSURE: 78 MMHG | HEIGHT: 70 IN | WEIGHT: 191 LBS

## 2018-03-09 DIAGNOSIS — E78.5 DYSLIPIDEMIA: ICD-10-CM

## 2018-03-09 DIAGNOSIS — Z87.19 H/O: GI BLEED: ICD-10-CM

## 2018-03-09 DIAGNOSIS — I25.10 CORONARY ARTERY DISEASE INVOLVING NATIVE CORONARY ARTERY OF NATIVE HEART WITHOUT ANGINA PECTORIS: ICD-10-CM

## 2018-03-09 DIAGNOSIS — I48.0 PAROXYSMAL ATRIAL FIBRILLATION (HCC): ICD-10-CM

## 2018-03-09 DIAGNOSIS — I25.5 ISCHEMIC CARDIOMYOPATHY: Primary | ICD-10-CM

## 2018-03-09 NOTE — PROGRESS NOTES
Chief Complaint   Patient presents with    Follow-up     Ischemic cardiomyopathy, PAF, Systolic CHF, chronic      1. Have you been to the ER, urgent care clinic since your last visit? Hospitalized since your last visit? No    2. Have you seen or consulted any other health care providers outside of the [address] [place] [address] since your last visit? Include any pap smears or colon screening.  No\

## 2018-03-09 NOTE — MR AVS SNAPSHOT
[address] 
[phone] Patient: Gigi Funk. MRN: [id number] UO Visit Information Date & Time Provider Department Dept. Phone Encounter #  
 3/9/2018  9:40 AM Yao Byers MD [facility] [phone] [id number] Your Appointments 3/13/2018  9:30 AM  
Follow Up with Kamla Butler, [place] [id number] [facility] [address]  
[phone]  
  
   
 [address] [facility] [address] [place] [address]  
  
    
 2018  9:30 AM  
ESTABLISHED PATIENT with Marysol Steven MD  
[address] [facility] Appt Note: 3mo fuv  
 [address] [address]  
[phone]  
  
   
 [address] [address]  
  
    
 2018 10:00 AM  
ESTABLISHED PATIENT with Lainey Garcia MD  
[facility] ([facility]) Appt Note: annual  
 [address]  
[phone]  
  
   
 [address] [address]  
  
    
 2018 10:00 AM  
PACEMAKER with PACEMAKER, ALYSSA  
[facility] ([facility]) Appt Note: jen sci ICD/stf/study  sherin [address] [facility] [address]  
[phone]  
  
   
 [address] [address]  
  
    
 2018 10:20 AM  
ESTABLISHED PATIENT with Yao Byers MD  
[facility] ([facility]) Appt Note: 6 mo fu  
 [address] [address]  
[phone]  
  
   
 [address] [address]  
  
    
  
 3/22/2018 10:45 AM  
REMOTE OFFICE VISIT with Araceli Suarez [facility] ([facility]) Appt Note: Lat ICD/stf/study [address] [facility] [address] [facility] [address] [address] Upcoming Health Maintenance Date Due  
 FOOT EXAM Q1 11/23/1962 EYE EXAM RETINAL OR DILATED Q1 11/23/1962 DTaP/Tdap/Td series (1 - Tdap) 11/23/1973 ZOSTER VACCINE AGE 60> 9/23/2012 GLAUCOMA SCREENING Q2Y 11/23/2017 Bone Densitometry (Dexa) Screening 11/23/2017 HEMOGLOBIN A1C Q6M 9/5/2018 Pneumococcal 65+ Low/Medium Risk (2 of 2 - PPSV23) 11/15/2018 MICROALBUMIN Q1 12/4/2018 COLONOSCOPY 2/5/2019 LIPID PANEL Q1 3/5/2019 MEDICARE YEARLY EXAM 3/6/2019 Allergies as of 3/9/2018  Review Complete On: 3/9/2018 By: Nadine Almaguer MD  
  
 Severity Noted Reaction Type Reactions Heparin (Porcine) High 02/07/2017    Other (comments) Was told when in the hospital that if he received heparin again that it would be deadly. Current Immunizations  Reviewed on 12/4/2017 Name Date Influenza Nasal Vaccine 11/15/2017 Pneumococcal Conjugate (PCV-13) 11/15/2017 Not reviewed this visit Vitals BP Pulse Resp Height(growth percentile) Weight(growth percentile) SpO2  
 150/78 (BP 1 Location: Left arm, BP Patient Position: Sitting) 66 16 5' 10\" (1.778 m) 191 lb (86.6 kg) 99% BMI Smoking Status 27.41 kg/m2 Former Smoker Vitals History BMI and BSA Data Body Mass Index Body Surface Area  
 27.41 kg/m 2 2.07 m 2 [facility] [place] [place] [address] [phone] Your Updated Medication List  
  
   
This list is accurate as of 3/9/18 11:00 AM.  Always use your most recent med list.  
  
  
  
  
 amiodarone 200 mg tablet Commonly known as:  CORDARONE Take 1 Tab by mouth daily.  Indications: PREVENTION OF RECURRENT ATRIAL FIBRILLATION  
  
 aspirin delayed-release 81 mg tablet Take 1 Tab by mouth daily. atorvastatin 10 mg tablet Commonly known as:  LIPITOR  
TAKE ONE TABLET BY MOUTH ONCE DAILY  
  
 bumetanide 1 mg tablet Commonly known as:  Lenora Crafts Take 0.5 mg by mouth as needed (fluid retention). carvedilol 3.125 mg tablet Commonly known as:  COREG  
TAKE ONE TABLET BY MOUTH TWICE DAILY WITH MEALS  
  
 clopidogrel 75 mg Tab Commonly known as:  PLAVIX Take 1 Tab by mouth daily. DULoxetine 30 mg capsule Commonly known as:  CYMBALTA Take 1 Cap by mouth daily. Insulin Syringes (Disposable) 1 mL Syrg Pt to take 10 units w/ breakfast and 8 units w/ dinner * NovoLIN 70/30 U-100 Insulin 100 unit/mL (70-30) injection Generic drug:  insulin NPH/insulin regular 10 Units by SubCUTAneous route Daily (before breakfast). * NovoLIN 70/30 U-100 Insulin 100 unit/mL (70-30) injection Generic drug:  insulin NPH/insulin regular  
8 Units by SubCUTAneous route Daily (before dinner). patiromer calcium sorbitex 8.4 gram powder Commonly known as:  VELTASSA Take 8.4 g by mouth daily. sacubitril-valsartan  mg tablet Commonly known as:  ENTRESTO Take 1 Tab by mouth two (2) times a day. spironolactone 25 mg tablet Commonly known as:  ALDACTONE Take 1 Tab by mouth daily. TYLENOL EXTRA STRENGTH 500 mg tablet Generic drug:  acetaminophen Take 1,000 mg by mouth every six (6) hours as needed for Pain. Indications: BACK PAIN  
  
 * Notice: This list has 2 medication(s) that are the same as other medications prescribed for you. Read the directions carefully, and ask your doctor or other care provider to review them with you. Introducing [facility]! Beatrice Blackburn introduces [employer] patient portal. Now you can access parts of your medical record, email your doctor's office, and request medication refills online.    
 
1. In your internet browser, go to [email or web address] 2. Click on the First Time User? Click Here link in the Sign In box. You will see the New Member Sign Up page. 3. Enter your [employer] Access Code exactly as it appears below. You will not need to use this code after youve completed the sign-up process. If you do not sign up before the expiration date, you must request a new code. · [employer] Access Code: [email or web address] Expires: 5/6/2018 10:20 AM 
 
4. Enter the last four digits of your Social Security Number (xxxx) and Date of Birth (mm/dd/yyyy) as indicated and click Submit. You will be taken to the next sign-up page. 5. Create a [employer] ID. This will be your [employer] login ID and cannot be changed, so think of one that is secure and easy to remember. 6. Create a [employer] password. You can change your password at any time. 7. Enter your Password Reset Question and Answer. This can be used at a later time if you forget your password. 8. Enter your e-mail address. You will receive e-mail notification when new information is available in [address] 9. Click Sign Up. You can now view and download portions of your medical record. 10. Click the Download Summary menu link to download a portable copy of your medical information. If you have questions, please visit the Frequently Asked Questions section of the [employer] website. Remember, [employer] is NOT to be used for urgent needs. For medical emergencies, dial 911. Now available from your iPhone and Android! Please provide this summary of care documentation to your next provider. Your primary care clinician is listed as MICHAEL BALLARD. If you have any questions after today's visit, please call [phone].

## 2018-03-09 NOTE — PERIOP NOTES
Faxed PAT testing reports to Dr. Sascha Cummings . NOTIFIED Carry Mariner ( DR FAITH`S OFFICE) RE: abnormal BUN ,CREATININE.

## 2018-03-09 NOTE — TELEPHONE ENCOUNTER
Patient is due to have total rectal surgery this coming Tuesday @1:00pm with dr Clinton Linn at [facility] they need the okay from dr Daryle    Phone(pt):[phone]  Fax(dr): [phone]

## 2018-03-09 NOTE — PROGRESS NOTES
Cindy Kuo MD. [facility]              Patient: Sophie Verde. : 1950      Today's Date: 3/9/2018            HISTORY OF PRESENT ILLNESS:     History of Present Illness:  Here for follow-up. No CP or SOB. No orthopnea. No dizziness or lightheadedness. Off of ASA and plavix for colorectal surgery next week ([facility]). Has chronic claudication. PAST MEDICAL HISTORY:     Past Medical History:   Diagnosis Date    Arthritis     hands/fingers    CAD (coronary artery disease)     involving native coronary artery of native heart without angina pectoris    Cardiomyopathy ([facility] 2017    A. Echo (17):  EF 5-10% with severe GHK,. Mildly dil LA. Mild TR. PASP 46./systemic cardiomyopathy    Cardiomyopathy ([facility] [facility]     Chronic kidney disease     Chronic renal insufficiency 2017    Diabetes mellitus ([facility] [facility] 3/6/2017    IDDM    Dyslipidemia 3/6/2017    A. FLP (17): Tot 120, , HDL 19, LDL 76 (no Rx).  H/O: GI bleed 3/6/2017    Heart failure (HCC)     chronic systolic CHF most recent     Hepatitis C 3/6/2017    Ill-defined condition     hypokalemia    Ill-defined condition     flu 2018     Paroxysmal atrial fibrillation ([facility] [facility] 3/6/2017    Peripheral vascular disease ([facility] [address] [facility] 2017    A. RICKY (3/20/17): Right 0.58, Left 0.56. Past Surgical History:   Procedure Laterality Date    COLONOSCOPY N/A 2017    COLONOSCOPY performed by Nicolasa Leyva MD at [address] COLONOSCOPY N/A 2018    COLONOSCOPY performed by Nicolasa Leyva MD at [address] HX COLONOSCOPY      [facility] 2017    HX GI      colonoscopy x 3 - last 2017 - polyp    HX HEART CATHETERIZATION      3 heart stents    HX OTHER SURGICAL  2017    AICD    HX PACEMAKER      AICD           MEDICATIONS:     Current Outpatient Prescriptions   Medication Sig Dispense Refill    DULoxetine (CYMBALTA) 30 mg capsule Take 1 Cap by mouth daily.  (Patient taking differently: Take 30 mg by mouth daily (with dinner). ) 90 Cap 3    amiodarone (CORDARONE) 200 mg tablet Take 1 Tab by mouth daily. Indications: PREVENTION OF RECURRENT ATRIAL FIBRILLATION 30 Tab 6    insulin NPH/insulin regular (NOVOLIN 70/30) 100 unit/mL (70-30) injection 10 Units by SubCUTAneous route Daily (before breakfast).  insulin NPH/insulin regular (NOVOLIN 70/30) 100 unit/mL (70-30) injection 8 Units by SubCUTAneous route Daily (before dinner).  carvedilol (COREG) 3.125 mg tablet TAKE ONE TABLET BY MOUTH TWICE DAILY WITH MEALS 60 Tab 4    atorvastatin (LIPITOR) 10 mg tablet TAKE ONE TABLET BY MOUTH ONCE DAILY (Patient taking differently: Take 10 mg by mouth nightly. TAKE ONE TABLET BY MOUTH ONCE DAILY) 30 Tab 4    sacubitril-valsartan (ENTRESTO) 97 mg/103 mg tablet Take 1 Tab by mouth two (2) times a day. 180 Tab 4    patiromer calcium sorbitex (VELTASSA) 8.4 gram powder Take 8.4 g by mouth daily. 4 Packet 0    spironolactone (ALDACTONE) 25 mg tablet Take 1 Tab by mouth daily. (Patient taking differently: Take 25 mg by mouth daily (with dinner). ) 90 Tab 1    bumetanide (BUMEX) 1 mg tablet Take 0.5 mg by mouth as needed (fluid retention).  acetaminophen (TYLENOL EXTRA STRENGTH) 500 mg tablet Take 1,000 mg by mouth every six (6) hours as needed for Pain. Indications: BACK PAIN      Insulin Syringes, Disposable, 1 mL syrg Pt to take 10 units w/ breakfast and 8 units w/ dinner 500 Syringe 1    clopidogrel (PLAVIX) 75 mg tab Take 1 Tab by mouth daily. 30 Tab 4    aspirin delayed-release 81 mg tablet Take 1 Tab by mouth daily. 30 Tab 1       Allergies   Allergen Reactions    Heparin (Porcine) Other (comments)     Was told when in the hospital that if he received heparin again that it would be deadly.            SOCIAL HISTORY:     Social History   Substance Use Topics    Smoking status: Former Smoker     Packs/day: 2.00     Years: 45.00     Quit date: 1/20/2017    Smokeless tobacco: Never Used    Alcohol use No         FAMILY HISTORY:     Family History   Problem Relation Age of Onset    Hypertension Mother     Heart Disease Mother      MURMUR    Cancer Father      COLON    Colon Cancer Father     Other Sister      born totally handicapped/epileptic    Kidney Disease Sister       from renal shutdown    Heart Disease Brother     Other Brother      ?pancreatic cancer or ?pancreatitis    Cancer Maternal Uncle      toes and spread    Cancer Paternal Uncle      stomach    Heart Attack Maternal Grandfather 47    Cancer Paternal Grandfather      bone    Cancer Maternal Uncle      stomach    Cancer Maternal Uncle      lung    Anesth Problems Neg Hx              REVIEW OF SYMPTOMS:     Review of Symptoms:  Constitutional: Negative for fever, chills  HEENT: Negative for nosebleeds, tinnitus, and vision changes. Respiratory: Negative for cough, wheezing  Cardiovascular: Negative for orthopnea, syncope, and PND. Gastrointestinal: Negative for abdominal pain, diarrhea, melena. Genitourinary: Negative for dysuria  Musculoskeletal: + leg pain when walking long distance   Skin: Negative for rash  Heme: No problems bleeding. Neurological: Negative for speech change and focal weakness. PHYSICAL EXAM:     Physical Exam:  Visit Vitals    /78 (BP 1 Location: Left arm, BP Patient Position: Sitting)    Pulse 66    Resp 16    Ht 5' 10\" (1.778 m)    Wt 191 lb (86.6 kg)    SpO2 99%    BMI 27.41 kg/m2     Patient appears generally well, mood and affect are appropriate and pleasant. HEENT:  Hearing intact, non-icteric, normocephalic, atraumatic. Neck Exam: Supple, No JVD or carotid bruits. Lung Exam: Clear to auscultation, even breath sounds. Cardiac Exam: Regular rate and rhythm with no murmur  Abdomen: Soft, non-tender, normal bowel sounds. No bruits or masses. Extremities: Moves all ext well. No lower extremity edema.   Vascular: weak dorsalis pedis pulses bilaterally. Psych: Appropriate affect  Neuro - Grossly intact        LABS / OTHER STUDIES:       Lab Results   Component Value Date/Time    Sodium 140 03/08/2018 02:42 PM    Potassium 4.8 03/08/2018 02:42 PM    Chloride 106 03/08/2018 02:42 PM    CO2 28 03/08/2018 02:42 PM    Anion gap 6 03/08/2018 02:42 PM    Glucose 89 03/08/2018 02:42 PM    BUN 28 (H) 03/08/2018 02:42 PM    Creatinine 1.85 (H) 03/08/2018 02:42 PM    BUN/Creatinine ratio 15 03/08/2018 02:42 PM    GFR est AA 45 (L) 03/08/2018 02:42 PM    GFR est non-AA 37 (L) 03/08/2018 02:42 PM    Calcium 9.1 03/08/2018 02:42 PM    Bilirubin, total 0.4 03/05/2018 10:07 AM    AST (SGOT) 24 03/05/2018 10:07 AM    Alk. phosphatase 59 03/05/2018 10:07 AM    Protein, total 6.9 03/05/2018 10:07 AM    Albumin 4.2 03/05/2018 10:07 AM    Globulin 4.4 (H) 02/22/2017 03:16 AM    A-G Ratio 1.6 03/05/2018 10:07 AM    ALT (SGPT) 25 03/05/2018 10:07 AM     Lab Results   Component Value Date/Time    WBC 7.8 03/08/2018 02:42 PM    HGB 13.8 03/08/2018 02:42 PM    HCT 42.0 03/08/2018 02:42 PM    PLATELET 037 93/99/7157 02:42 PM    MCV 87.0 03/08/2018 02:42 PM       Lab Results   Component Value Date/Time    Cholesterol, total 188 03/05/2018 10:07 AM    HDL Cholesterol 39 (L) 03/05/2018 10:07 AM    LDL, calculated 122 (H) 03/05/2018 10:07 AM    VLDL, calculated 27 03/05/2018 10:07 AM    Triglyceride 137 03/05/2018 10:07 AM    CHOL/HDL Ratio 6.3 (H) 01/19/2017 03:50 AM             CARDIAC DIAGNOSTICS:     Cardiac Evaluation Includes:    A.  Echo (1/19/17):  EF 5-10% with severe GHK,. Mildly dil LA. Mild TR. PASP 46. B. Cath (1/20/17):  LM ost70. LAD m50. D1 80. LCx d70; OM1 99, OM2 90. RCA p100. No AVG. PAP  53/27/36. C.  PCI (2/9/17): pRCA 2.5x38 Xience + 2.75x12 Xience. ostLM 4.0x12 Xience post-dil with 4.5x12 NC. D.  Echo (2/13/17):  EF 10% with severe GHK, PSM. Dil LA. Mod MR. Mild to mod TR. Left pleural effusion.   E.  Echo (5/15/17): EF 25% with severe GHK and basl-mid inf AK, mildly dil LV, DD. Sev dil LA. Mod MR. Mild TR. PASP 35. F.  ICD (6/12/17, Anup): [employer]. ASSESSMENT AND PLAN:     Assessment and Plan:  1) Chronic systolic heart failure (EF 25%),   - ischemic cardiomyopathy, NYHA class II symptoms   - Continue coreg, entresto, aldactone  - Prn bumex      2) CAD s/p PCI  - Cont Coreg and statin   - denies anginal complaints   - DAPT being held for upcoming colorectal surgery in a few days - would resume aspirin as soon as safe after surgery. Would resume plavix when it is felt to be safe with a low bleeding risk.         3) CKD      4) PAF  - remains SR  - On Amiodarone  - Dr. Jacob Crane following   - No anticoag 2/2 GI bleed/AVMs      5) H/O lower GIB  - colonic polyp and AVM      6) Diabetes  - per PCP    7) Dyslipidemia  - cont statin     8) Preop ([facility])  - Mr. Konstantin Allen does have elevated cardiac risk with surgery, but is on optimal medical management and compensated from cardiac standpoint. He can proceed with his surgery on his cardiac meds. 9) See me back in 6 months. Patient expressed understanding of the plan - questions were answered. Norman Frost MD, [address], [address] [place] [address]  Ph: [phone]   Ph [phone]      ADDENDUM   7/12/2018  Echo 7/11/18 - LV mildly dilated. LVEF 25%. Akinesis of the basal-mid inferior wall(s). Grade 1 diastolic dysfunction. Mild-mod LAE. Mild MR. PASP 42. Sinus of valsalva was 4.3 cm, STJ was 3.4 and ascending aorta was 3.6 cm. Echo with stable findings. Echo similar to prior echo 2017. Will have my nurse call.

## 2018-03-09 NOTE — TELEPHONE ENCOUNTER
Dr Eva Quinn will have his note completed later this PM. It will be in [facility] care for all care givers to see. No answer at the number provided.

## 2018-03-09 NOTE — PERIOP NOTES
EKG done 1-11-18 reviewed by Dr. Corinne Almodovar - Anesthesia and cardiac clearance requested. Telephone call to [address] [place] [address] office to notify cardiac clearance needed.

## 2018-03-12 ENCOUNTER — TELEPHONE (OUTPATIENT)
Dept: CARDIOLOGY CLINIC | Age: 68
End: 2018-03-12

## 2018-03-12 NOTE — PERIOP NOTES
SPOKE WITH , SHE SAID DR. FAITH IS AWARE PATIENT NEEDED CLEARANCE AND SINCE PATIENT RECENTLY SAW HIS CARDIOLOGIST , HE WAS GOING TO CALL OVER TO CARDIOLOGIST TO GET THE CLEARANCE NOTE.

## 2018-03-13 ENCOUNTER — ANESTHESIA EVENT (OUTPATIENT)
Dept: SURGERY | Age: 68
End: 2018-03-13
Payer: MEDICARE

## 2018-03-13 ENCOUNTER — ANESTHESIA (OUTPATIENT)
Dept: SURGERY | Age: 68
End: 2018-03-13
Payer: MEDICARE

## 2018-03-13 ENCOUNTER — HOSPITAL ENCOUNTER (OUTPATIENT)
Age: 68
Setting detail: OUTPATIENT SURGERY
Discharge: HOME | End: 2018-03-13
Attending: COLON & RECTAL SURGERY | Admitting: COLON & RECTAL SURGERY
Payer: MEDICARE

## 2018-03-13 ENCOUNTER — DOCUMENTATION ONLY (OUTPATIENT)
Dept: CARDIOLOGY CLINIC | Age: 68
End: 2018-03-13

## 2018-03-13 VITALS
SYSTOLIC BLOOD PRESSURE: 148 MMHG | WEIGHT: 189 LBS | RESPIRATION RATE: 20 BRPM | HEART RATE: 70 BPM | DIASTOLIC BLOOD PRESSURE: 54 MMHG | TEMPERATURE: 98 F | BODY MASS INDEX: 27.06 KG/M2 | OXYGEN SATURATION: 91 % | HEIGHT: 70 IN

## 2018-03-13 DIAGNOSIS — G89.18 ACUTE POST-OPERATIVE PAIN: Primary | ICD-10-CM

## 2018-03-13 LAB
GLUCOSE BLD STRIP.AUTO-MCNC: 146 MG/DL (ref 65–100)
SERVICE CMNT-IMP: ABNORMAL

## 2018-03-13 PROCEDURE — 77030031139 HC SUT VCRL2 J&J -A: Performed by: COLON & RECTAL SURGERY

## 2018-03-13 PROCEDURE — 76210000016 HC OR PH I REC 1 TO 1.5 HR: Performed by: COLON & RECTAL SURGERY

## 2018-03-13 PROCEDURE — 74011250636 HC RX REV CODE- 250/636

## 2018-03-13 PROCEDURE — 77030020782 HC GWN BAIR PAWS FLX 3M -B

## 2018-03-13 PROCEDURE — 76210000020 HC REC RM PH II FIRST 0.5 HR: Performed by: COLON & RECTAL SURGERY

## 2018-03-13 PROCEDURE — 74011250636 HC RX REV CODE- 250/636: Performed by: ANESTHESIOLOGY

## 2018-03-13 PROCEDURE — 88302 TISSUE EXAM BY PATHOLOGIST: CPT | Performed by: COLON & RECTAL SURGERY

## 2018-03-13 PROCEDURE — 76010000138 HC OR TIME 0.5 TO 1 HR: Performed by: COLON & RECTAL SURGERY

## 2018-03-13 PROCEDURE — 82962 GLUCOSE BLOOD TEST: CPT

## 2018-03-13 PROCEDURE — 77030011640 HC PAD GRND REM COVD -A: Performed by: COLON & RECTAL SURGERY

## 2018-03-13 PROCEDURE — 76060000032 HC ANESTHESIA 0.5 TO 1 HR: Performed by: COLON & RECTAL SURGERY

## 2018-03-13 PROCEDURE — 74011000272 HC RX REV CODE- 272: Performed by: COLON & RECTAL SURGERY

## 2018-03-13 PROCEDURE — 77030018836 HC SOL IRR NACL ICUM -A: Performed by: COLON & RECTAL SURGERY

## 2018-03-13 PROCEDURE — 74011000250 HC RX REV CODE- 250

## 2018-03-13 PROCEDURE — 74011000250 HC RX REV CODE- 250: Performed by: COLON & RECTAL SURGERY

## 2018-03-13 PROCEDURE — 74011250637 HC RX REV CODE- 250/637: Performed by: COLON & RECTAL SURGERY

## 2018-03-13 RX ORDER — SODIUM CHLORIDE 0.9 % (FLUSH) 0.9 %
5-10 SYRINGE (ML) INJECTION AS NEEDED
Status: DISCONTINUED | OUTPATIENT
Start: 2018-03-13 | End: 2018-03-13 | Stop reason: HOSPADM

## 2018-03-13 RX ORDER — LIDOCAINE HYDROCHLORIDE 10 MG/ML
0.1 INJECTION, SOLUTION EPIDURAL; INFILTRATION; INTRACAUDAL; PERINEURAL AS NEEDED
Status: DISCONTINUED | OUTPATIENT
Start: 2018-03-13 | End: 2018-03-13 | Stop reason: HOSPADM

## 2018-03-13 RX ORDER — SODIUM CHLORIDE, SODIUM LACTATE, POTASSIUM CHLORIDE, CALCIUM CHLORIDE 600; 310; 30; 20 MG/100ML; MG/100ML; MG/100ML; MG/100ML
75 INJECTION, SOLUTION INTRAVENOUS CONTINUOUS
Status: DISCONTINUED | OUTPATIENT
Start: 2018-03-13 | End: 2018-03-13 | Stop reason: HOSPADM

## 2018-03-13 RX ORDER — MIDAZOLAM HYDROCHLORIDE 1 MG/ML
0.5 INJECTION, SOLUTION INTRAMUSCULAR; INTRAVENOUS
Status: DISCONTINUED | OUTPATIENT
Start: 2018-03-13 | End: 2018-03-13 | Stop reason: HOSPADM

## 2018-03-13 RX ORDER — ONDANSETRON 2 MG/ML
INJECTION INTRAMUSCULAR; INTRAVENOUS AS NEEDED
Status: DISCONTINUED | OUTPATIENT
Start: 2018-03-13 | End: 2018-03-13 | Stop reason: HOSPADM

## 2018-03-13 RX ORDER — BUPIVACAINE HYDROCHLORIDE AND EPINEPHRINE 5; 5 MG/ML; UG/ML
INJECTION, SOLUTION EPIDURAL; INTRACAUDAL; PERINEURAL AS NEEDED
Status: DISCONTINUED | OUTPATIENT
Start: 2018-03-13 | End: 2018-03-13 | Stop reason: HOSPADM

## 2018-03-13 RX ORDER — SODIUM CHLORIDE 9 MG/ML
50 INJECTION, SOLUTION INTRAVENOUS CONTINUOUS
Status: DISCONTINUED | OUTPATIENT
Start: 2018-03-13 | End: 2018-03-13 | Stop reason: HOSPADM

## 2018-03-13 RX ORDER — SODIUM CHLORIDE 0.9 % (FLUSH) 0.9 %
5-10 SYRINGE (ML) INJECTION EVERY 8 HOURS
Status: DISCONTINUED | OUTPATIENT
Start: 2018-03-13 | End: 2018-03-13 | Stop reason: HOSPADM

## 2018-03-13 RX ORDER — SODIUM CHLORIDE, SODIUM LACTATE, POTASSIUM CHLORIDE, CALCIUM CHLORIDE 600; 310; 30; 20 MG/100ML; MG/100ML; MG/100ML; MG/100ML
INJECTION, SOLUTION INTRAVENOUS
Status: DISCONTINUED | OUTPATIENT
Start: 2018-03-13 | End: 2018-03-13 | Stop reason: HOSPADM

## 2018-03-13 RX ORDER — MIDAZOLAM HYDROCHLORIDE 1 MG/ML
1 INJECTION, SOLUTION INTRAMUSCULAR; INTRAVENOUS AS NEEDED
Status: DISCONTINUED | OUTPATIENT
Start: 2018-03-13 | End: 2018-03-13 | Stop reason: HOSPADM

## 2018-03-13 RX ORDER — OXYCODONE HYDROCHLORIDE 5 MG/1
5 TABLET ORAL ONCE
Status: COMPLETED | OUTPATIENT
Start: 2018-03-13 | End: 2018-03-13

## 2018-03-13 RX ORDER — MORPHINE SULFATE 10 MG/ML
2 INJECTION, SOLUTION INTRAMUSCULAR; INTRAVENOUS
Status: DISCONTINUED | OUTPATIENT
Start: 2018-03-13 | End: 2018-03-13 | Stop reason: HOSPADM

## 2018-03-13 RX ORDER — BUPIVACAINE HYDROCHLORIDE AND EPINEPHRINE 2.5; 5 MG/ML; UG/ML
30 INJECTION, SOLUTION EPIDURAL; INFILTRATION; INTRACAUDAL; PERINEURAL ONCE
Status: DISCONTINUED | OUTPATIENT
Start: 2018-03-13 | End: 2018-03-13 | Stop reason: HOSPADM

## 2018-03-13 RX ORDER — ONDANSETRON 2 MG/ML
4 INJECTION INTRAMUSCULAR; INTRAVENOUS AS NEEDED
Status: DISCONTINUED | OUTPATIENT
Start: 2018-03-13 | End: 2018-03-13 | Stop reason: HOSPADM

## 2018-03-13 RX ORDER — DEXAMETHASONE SODIUM PHOSPHATE 4 MG/ML
INJECTION, SOLUTION INTRA-ARTICULAR; INTRALESIONAL; INTRAMUSCULAR; INTRAVENOUS; SOFT TISSUE AS NEEDED
Status: DISCONTINUED | OUTPATIENT
Start: 2018-03-13 | End: 2018-03-13 | Stop reason: HOSPADM

## 2018-03-13 RX ORDER — OXYCODONE AND ACETAMINOPHEN 5; 325 MG/1; MG/1
1 TABLET ORAL AS NEEDED
Status: DISCONTINUED | OUTPATIENT
Start: 2018-03-13 | End: 2018-03-13 | Stop reason: HOSPADM

## 2018-03-13 RX ORDER — FENTANYL CITRATE 50 UG/ML
50 INJECTION, SOLUTION INTRAMUSCULAR; INTRAVENOUS AS NEEDED
Status: DISCONTINUED | OUTPATIENT
Start: 2018-03-13 | End: 2018-03-13 | Stop reason: HOSPADM

## 2018-03-13 RX ORDER — FENTANYL CITRATE 50 UG/ML
25 INJECTION, SOLUTION INTRAMUSCULAR; INTRAVENOUS
Status: DISCONTINUED | OUTPATIENT
Start: 2018-03-13 | End: 2018-03-13 | Stop reason: HOSPADM

## 2018-03-13 RX ORDER — DIPHENHYDRAMINE HYDROCHLORIDE 50 MG/ML
12.5 INJECTION, SOLUTION INTRAMUSCULAR; INTRAVENOUS AS NEEDED
Status: DISCONTINUED | OUTPATIENT
Start: 2018-03-13 | End: 2018-03-13 | Stop reason: HOSPADM

## 2018-03-13 RX ORDER — HYDROMORPHONE HYDROCHLORIDE 2 MG/ML
INJECTION, SOLUTION INTRAMUSCULAR; INTRAVENOUS; SUBCUTANEOUS AS NEEDED
Status: DISCONTINUED | OUTPATIENT
Start: 2018-03-13 | End: 2018-03-13 | Stop reason: HOSPADM

## 2018-03-13 RX ORDER — HYDROMORPHONE HYDROCHLORIDE 1 MG/ML
0.2 INJECTION, SOLUTION INTRAMUSCULAR; INTRAVENOUS; SUBCUTANEOUS
Status: DISCONTINUED | OUTPATIENT
Start: 2018-03-13 | End: 2018-03-13 | Stop reason: HOSPADM

## 2018-03-13 RX ORDER — MIDAZOLAM HYDROCHLORIDE 1 MG/ML
INJECTION, SOLUTION INTRAMUSCULAR; INTRAVENOUS AS NEEDED
Status: DISCONTINUED | OUTPATIENT
Start: 2018-03-13 | End: 2018-03-13 | Stop reason: HOSPADM

## 2018-03-13 RX ORDER — OXYCODONE HYDROCHLORIDE 5 MG/1
5-10 TABLET ORAL
Qty: 40 TAB | Refills: 0 | Status: SHIPPED | OUTPATIENT
Start: 2018-03-13 | End: 2018-05-23

## 2018-03-13 RX ORDER — ACETAMINOPHEN 10 MG/ML
INJECTION, SOLUTION INTRAVENOUS AS NEEDED
Status: DISCONTINUED | OUTPATIENT
Start: 2018-03-13 | End: 2018-03-13 | Stop reason: HOSPADM

## 2018-03-13 RX ORDER — GLYCOPYRROLATE 0.2 MG/ML
INJECTION INTRAMUSCULAR; INTRAVENOUS AS NEEDED
Status: DISCONTINUED | OUTPATIENT
Start: 2018-03-13 | End: 2018-03-13 | Stop reason: HOSPADM

## 2018-03-13 RX ORDER — PROPOFOL 10 MG/ML
INJECTION, EMULSION INTRAVENOUS
Status: DISCONTINUED | OUTPATIENT
Start: 2018-03-13 | End: 2018-03-13 | Stop reason: HOSPADM

## 2018-03-13 RX ADMIN — SODIUM CHLORIDE, SODIUM LACTATE, POTASSIUM CHLORIDE, AND CALCIUM CHLORIDE 75 ML/HR: 600; 310; 30; 20 INJECTION, SOLUTION INTRAVENOUS at 12:43

## 2018-03-13 RX ADMIN — OXYCODONE HYDROCHLORIDE 5 MG: 5 TABLET ORAL at 15:12

## 2018-03-13 RX ADMIN — GLYCOPYRROLATE 0.2 MG: 0.2 INJECTION INTRAMUSCULAR; INTRAVENOUS at 13:16

## 2018-03-13 RX ADMIN — PROPOFOL 100 MCG/KG/MIN: 10 INJECTION, EMULSION INTRAVENOUS at 13:16

## 2018-03-13 RX ADMIN — ACETAMINOPHEN 1000 MG: 10 INJECTION, SOLUTION INTRAVENOUS at 13:27

## 2018-03-13 RX ADMIN — SODIUM CHLORIDE, SODIUM LACTATE, POTASSIUM CHLORIDE, CALCIUM CHLORIDE: 600; 310; 30; 20 INJECTION, SOLUTION INTRAVENOUS at 11:56

## 2018-03-13 RX ADMIN — MIDAZOLAM HYDROCHLORIDE 2 MG: 1 INJECTION, SOLUTION INTRAMUSCULAR; INTRAVENOUS at 13:07

## 2018-03-13 RX ADMIN — DEXAMETHASONE SODIUM PHOSPHATE 8 MG: 4 INJECTION, SOLUTION INTRA-ARTICULAR; INTRALESIONAL; INTRAMUSCULAR; INTRAVENOUS; SOFT TISSUE at 13:21

## 2018-03-13 RX ADMIN — ONDANSETRON 4 MG: 2 INJECTION INTRAMUSCULAR; INTRAVENOUS at 13:21

## 2018-03-13 RX ADMIN — MIDAZOLAM HYDROCHLORIDE 2 MG: 1 INJECTION, SOLUTION INTRAMUSCULAR; INTRAVENOUS at 13:15

## 2018-03-13 RX ADMIN — HYDROMORPHONE HYDROCHLORIDE 1 MG: 2 INJECTION, SOLUTION INTRAMUSCULAR; INTRAVENOUS; SUBCUTANEOUS at 13:23

## 2018-03-13 NOTE — ANESTHESIA POSTPROCEDURE EVALUATION
Post-Anesthesia Evaluation and Assessment    Patient: Black Andersen MRN: [id number]  SSN: [id number]    YOB: 1950  Age: 74 y.o. Sex: male       Cardiovascular Function/Vital Signs  Visit Vitals    /71    Pulse 67    Temp 36.7 °C (98 °F)    Resp 17    Ht 5' 10\" (1.778 m)    Wt 85.7 kg (189 lb)    SpO2 98%    BMI 27.12 kg/m2       Patient is status post general anesthesia for Procedure(s):  EXCISION HYPERTROPHIC ANAL PAPILLA (CHOICE ANESTH). Nausea/Vomiting: None    Postoperative hydration reviewed and adequate. Pain:  Pain Scale 1: Numeric (0 - 10) (03/13/18 1358)  Pain Intensity 1: 0 (03/13/18 1358)   Managed    Neurological Status:   Neuro (WDL): Within Defined Limits (03/13/18 1358)   At baseline    Mental Status and Level of Consciousness: Arousable    Pulmonary Status:   O2 Device: Nasal cannula (03/13/18 1357)   Adequate oxygenation and airway patent    Complications related to anesthesia: None    Post-anesthesia assessment completed.  No concerns    Signed By: Roxy Clancy MD     March 13, 2018

## 2018-03-13 NOTE — PROGRESS NOTES
Patient's wife returned patient assistance application for entresto on this date.  faxed the application to "[employer] on this date. Patient instructed to complete his cymbalta application with PCP office yet he returned it to [facility]. Faxed application for the patient.     Lucy Ridley, MSW, LCSW    Clinical    Jennifer Roman [phone]   [employer] ® ACP Facilitator

## 2018-03-13 NOTE — H&P
History and Physical (outpatient)    Patient: Janessa Kuhn. 74 y.o. male     Chief Complaint: Hypertrophic anal papilla. History of Present Illness: The patient is a 44-year-old male who underwent a colonoscopy performed by Dr. Adolph Escobar on 2/5/2018. The procedure was performed for colorectal cancer screening, and it revealed mild diverticulosis and several small polyps. All of the polyps that were removed proved to have been tubular adenomas. In the rectum/anal canal, below the dentate line, there was a polypoid lesion that was identified among a background of internal hemorrhoids. Because of its location, that lesion was not biopsied. The patient experiences some prolapse of tissue from the anus at times, and he recalls having had a small amount of rectal bleeding in February 2017. He usually moves his bowels 1 to 2 times per day, and he has no anorectal pain or fecal incontinence. Examination of the perineum in the office on 3/1/2018 revealed a prolapsing, pedunculated, polypoid lesion that appeared to be most consistent with a hypertrophic anal papilla. The lesion was reducible, and on anoscopy it was observed to have a narrow base of approximately 7 mm. There were also some enlarged internal hemorrhoids nearby. History:  Past Medical History:   Diagnosis Date    Arthritis     hands/fingers    CAD (coronary artery disease)     involving native coronary artery of native heart without angina pectoris    Cardiomyopathy ([address] 01/20/2017    A. Echo (1/19/17):  EF 5-10% with severe GHK,. Mildly dil LA. Mild TR. PASP 46./systemic cardiomyopathy    Cardiomyopathy ([address]     Chronic kidney disease     Chronic renal insufficiency 03/06/2017    Diabetes mellitus ([address] 3/6/2017    IDDM    Dyslipidemia 3/6/2017    A. FLP (1/19/17): Tot 120, , HDL 19, LDL 76 (no Rx).     H/O: GI bleed 3/6/2017    Heart failure (HCC)     chronic systolic CHF most recent [id number]    Hepatitis C 3/6/2017    Ill-defined condition     hypokalemia    Ill-defined condition     flu 2018     Paroxysmal atrial fibrillation ([facility] 3/6/2017    Peripheral vascular disease ([facility] [address] [facility] 2017    A. RICKY (3/20/17): Right 0.58, Left 0.56. Past Surgical History:   Procedure Laterality Date    COLONOSCOPY N/A 2017    COLONOSCOPY performed by Christina Armas. Deann Sprague MD at [address] COLONOSCOPY N/A 2018    COLONOSCOPY performed by Christina Armas. Deann Sprague MD at [facility] ENDOSCOPY    HX COLONOSCOPY      [facility] 2017    HX GI      colonoscopy x 3 - last 2017 - polyp    HX HEART CATHETERIZATION      3 heart stents    HX OTHER SURGICAL  2017    AICD    HX PACEMAKER      AICD       Family History   Problem Relation Age of Onset    Hypertension Mother     Heart Disease Mother      MURMUR    Cancer Father      COLON    Colon Cancer Father     Other Sister      born totally handicapped/epileptic    Kidney Disease Sister       from renal shutdown    Heart Disease Brother     Other Brother      ?pancreatic cancer or ?pancreatitis    Cancer Maternal Uncle      toes and spread    Cancer Paternal Uncle      stomach    Heart Attack Maternal Grandfather 47    Cancer Paternal Grandfather      bone    Cancer Maternal Uncle      stomach    Cancer Maternal Uncle      lung    Anesth Problems Neg Hx      Social History     Social History    Marital status:      Spouse name: N/A    Number of children: N/A    Years of education: N/A     Occupational History    Not on file. Social History Main Topics    Smoking status: Former Smoker     Packs/day: 2.00     Years: 45.00     Quit date: 2017    Smokeless tobacco: Never Used    Alcohol use No    Drug use: Yes     Special: Marijuana    Sexual activity: No     Other Topics Concern    Not on file     Social History Narrative       Allergies:    Allergies   Allergen Reactions    Heparin (Porcine) Other (comments)     Was told when in the hospital that if he received heparin again that it would be deadly. Current Medications:  Cannot display prior to admission medications because the patient has not been admitted in this contact. No current facility-administered medications for this encounter. Current Outpatient Prescriptions   Medication Sig    DULoxetine (CYMBALTA) 30 mg capsule Take 1 Cap by mouth daily. (Patient taking differently: Take 30 mg by mouth daily (with dinner). )    amiodarone (CORDARONE) 200 mg tablet Take 1 Tab by mouth daily. Indications: PREVENTION OF RECURRENT ATRIAL FIBRILLATION    insulin NPH/insulin regular (NOVOLIN 70/30) 100 unit/mL (70-30) injection 10 Units by SubCUTAneous route Daily (before breakfast).  insulin NPH/insulin regular (NOVOLIN 70/30) 100 unit/mL (70-30) injection 8 Units by SubCUTAneous route Daily (before dinner).  carvedilol (COREG) 3.125 mg tablet TAKE ONE TABLET BY MOUTH TWICE DAILY WITH MEALS    atorvastatin (LIPITOR) 10 mg tablet TAKE ONE TABLET BY MOUTH ONCE DAILY (Patient taking differently: Take 10 mg by mouth nightly. TAKE ONE TABLET BY MOUTH ONCE DAILY)    clopidogrel (PLAVIX) 75 mg tab Take 1 Tab by mouth daily.  sacubitril-valsartan (ENTRESTO) 97 mg/103 mg tablet Take 1 Tab by mouth two (2) times a day.  patiromer calcium sorbitex (VELTASSA) 8.4 gram powder Take 8.4 g by mouth daily.  spironolactone (ALDACTONE) 25 mg tablet Take 1 Tab by mouth daily. (Patient taking differently: Take 25 mg by mouth daily (with dinner). )    bumetanide (BUMEX) 1 mg tablet Take 0.5 mg by mouth as needed (fluid retention).  acetaminophen (TYLENOL EXTRA STRENGTH) 500 mg tablet Take 1,000 mg by mouth every six (6) hours as needed for Pain. Indications: BACK PAIN    aspirin delayed-release 81 mg tablet Take 1 Tab by mouth daily.     Insulin Syringes, Disposable, 1 mL syrg Pt to take 10 units w/ breakfast and 8 units w/ dinner            Physical Exam:  There were no vitals taken for this visit.  GENERAL:  No apparent distress. CHEST:  Pacemaker in place. LUNGS:  Clear to auscultation bilaterally. HEART:  Regular rate and rhythm with no murmurs, gallops, or rubs. NEUROLOGIC: Alert and oriented. No gross deficits. Alerts:    Hospital Problems  Date Reviewed: 3/9/2018    None          Laboratory:    No results for input(s): HGB, HCT, WBC, PLT, INR, BUN, CREA, K, CRCLT, HGBEXT, HCTEXT, PLTEXT in the last 72 hours. No lab exists for component: PTT, PT, INREXT    Assessment and Plan:  The polypoid lesion appears to be most consistent with a hypertrophic anal papilla, but excision is indicated for diagnostic and potentially therapeutic purposes. We have discussed the risks of the procedure and the nature of the recovery in detail, and he has agreed to proceed.

## 2018-03-13 NOTE — IP AVS SNAPSHOT
[address] [facility] [address] 
[phone] Patient: Akin Mullins. MRN: [id number] WXX:[id number] About your hospitalization You were admitted on:  March 13, 2018 You last received care in the:  [facility] PACU You were discharged on:  March 13, 2018 Why you were hospitalized Your primary diagnosis was:  Not on File Follow-up Information Follow up With Details Comments Contact Info Everette Schirmer, MD   [address] [id number] [phone] Kadeem Fitzpatrick MD Go on 3/27/2018 Appt at 9:30am.  [address] [facility] [address] 
[phone] Your Scheduled Appointments Thursday March 22, 2018 10:45 AM EDT  
REMOTE OFFICE VISIT with Paco Cee [facility] ([facility] SCHEDULING) [address] [facility] 600 3176 [facility]  
[phone] Thursday March 29, 2018  2:00 PM EDT Follow Up with JENI Haddad [address] [place] [address] ([facility]) [address] [place] [address] [address]  
[phone] Discharge Orders None A check vitaliy indicates which time of day the medication should be taken. My Medications START taking these medications Instructions Each Dose to Equal  
 Morning Noon Evening Bedtime  
 oxyCODONE IR 5 mg immediate release tablet Commonly known as:  Michael Phy Your last dose was: Your next dose is: Take 1-2 Tabs by mouth every four (4) hours as needed for Pain. Max Daily Amount: 60 mg.  
 5-10 mg CHANGE how you take these medications Instructions Each Dose to Equal  
 Morning Noon Evening Bedtime  
 atorvastatin 10 mg tablet Commonly known as:  LIPITOR What changed:   
- how much to take 
- how to take this - when to take this 
- additional instructions Your last dose was: Your next dose is: TAKE ONE TABLET BY MOUTH ONCE DAILY DULoxetine 30 mg capsule Commonly known as:  CYMBALTA What changed:  when to take this Your last dose was: Your next dose is: Take 1 Cap by mouth daily. 30 mg  
    
   
   
   
  
 spironolactone 25 mg tablet Commonly known as:  ALDACTONE What changed:  when to take this Your last dose was: Your next dose is: Take 1 Tab by mouth daily. 25 mg CONTINUE taking these medications Instructions Each Dose to Equal  
 Morning Noon Evening Bedtime  
 amiodarone 200 mg tablet Commonly known as:  CORDARONE Your last dose was: Your next dose is: Take 1 Tab by mouth daily. Indications: PREVENTION OF RECURRENT ATRIAL FIBRILLATION  
 200 mg  
    
   
   
   
  
 bumetanide 1 mg tablet Commonly known as:  Haley Huff Your last dose was: Your next dose is: Take 0.5 mg by mouth as needed (fluid retention). 0.5 mg  
    
   
   
   
  
 carvedilol 3.125 mg tablet Commonly known as:  Roselinda Burkitt Your last dose was: Your next dose is: TAKE ONE TABLET BY MOUTH TWICE DAILY WITH MEALS Insulin Syringes (Disposable) 1 mL Syrg Your last dose was: Your next dose is:    
   
   
 Pt to take 10 units w/ breakfast and 8 units w/ dinner * NovoLIN 70/30 U-100 Insulin 100 unit/mL (70-30) injection Generic drug:  insulin NPH/insulin regular Your last dose was: Your next dose is:    
   
   
 10 Units by SubCUTAneous route Daily (before breakfast). 10 Units * NovoLIN 70/30 U-100 Insulin 100 unit/mL (70-30) injection Generic drug:  insulin NPH/insulin regular Your last dose was: Your next dose is: 8 Units by SubCUTAneous route Daily (before dinner). 8 Units  
    
   
   
   
  
 patiromer calcium sorbitex 8.4 gram powder Commonly known as:  VELTASSA Your last dose was: Your next dose is: Take 8.4 g by mouth daily. 8.4 g  
    
   
   
   
  
 sacubitril-valsartan  mg tablet Commonly known as:  ENTRESTO Your last dose was: Your next dose is: Take 1 Tab by mouth two (2) times a day. 1 Tab TYLENOL EXTRA STRENGTH 500 mg tablet Generic drug:  acetaminophen Your last dose was: Your next dose is: Take 1,000 mg by mouth every six (6) hours as needed for Pain. Indications: BACK PAIN  
 1000 mg * Notice: This list has 2 medication(s) that are the same as other medications prescribed for you. Read the directions carefully, and ask your doctor or other care provider to review them with you. STOP taking these medications   
 aspirin delayed-release 81 mg tablet  
   
  
 clopidogrel 75 mg Tab Commonly known as:  PLAVIX Where to Get Your Medications Information on where to get these meds will be given to you by the nurse or doctor. ! Ask your nurse or doctor about these medications  
  oxyCODONE IR 5 mg immediate release tablet Discharge Instructions Post-Operative Instructions 1. Diet:  Consume a high fiber diet as tolerated. Such a diet may include fresh fruits, vegetables, and whole grain cereals and breads. You should also drink 8-10 glasses of water, juice, or other non-alcoholic beverages per day. 2. Fiber Supplements:  Take 1 dose of Metamucil or other over-the-counter fiber supplement (Citrucel, BeneFiber, etc.) as directed each morning and another dose each evening. 3. Medications: Take pain medication as prescribed.   Do not drive, drink alcohol, or operate machinery while taking prescription pain medication. Take docusate (non-prescription stool softener) twice per day as directed. Beginning on the second day after surgery, you may take ibuprofen as directed in addition to or instead of the prescription medication if there has been no significant bleeding. Do not take pain medication on an empty stomach. Do not take aspirin for 10 days following the procedure. You should restart your Plavix on 3/19/2018 if there has not been any significant bleeding. If you are not sure whether or not to take it, please call me. 4. Activity:  Do not engage in any heavy lifting or other strenuous activity until you have been seen for follow-up. You may walk as much as you want, and you may climb stairs. Frequent walking will help prevent constipation. 5. Sitz Baths (soaking):  Remove the dressing on the first day after surgery or just before your next bowel movement (whichever comes first), then begin performing sitz baths 3 times per day and after bowel movements. The water should be as hot as you can tolerate, and you should soak for no longer than 20 minutes at a time. Do not wipe the anal area with dry toilet tissue; instead, use baby wipes. After sitz baths, cover the anal area with a gauze dressing. You may also apply Neosporin, Neosporin + Pain Relief, or Nupercainal ointment (a topical anesthetic). 6. Constipation:  If more than one day passes without a bowel movement, take 1 Tablespoon of Milk of Magnesia. Repeat in 6 hours if you have no results. If taking the second dose of the Milk of Magnesia does not result in a bowel movement, take two to four 5 mg Dulcolax (bisacodyl) tablets. If you have not had a bowel movement by the next morning after taking the bisacodyl, call my office. 7. Bleeding:  A small amount of bright red bleeding can be expected for the next several days.   If bleeding appears to be greater than a cup or seems to be continuous, call my office. 8. Other Problems:  If you experience intolerable pain, fever (temperature of 101 or more), or inability to urinate, call my office. 9. Questions: If you have any questions about your post-operative condition or care, do not hesitate to call my office. 10. Other: For anything other than scheduling, please call [phone]. 11.  Follow-up:  Please return to my office at 9:30 AM on Tuesday 3/27/2018. If you need to change the appointment, please call [phone] on a weekday between 9:00 AM and noon. Carina Rdz M.D. 
[phone] 
 
______________________________________________________________________ Anesthesia Discharge Instructions After general anesthesia or intervenous sedation, for 24 hours or while taking prescription Narcotics: · Limit your activities · Do not drive or operate hazardous machinery · If you have not urinated within 8 hours after discharge, please contact your surgeon on call. · Do not make important personal or business decisions · Do not drink alcoholic beverages Report the following to your surgeon: 
· Excessive pain, swelling, redness or odor of or around the surgical area · Temperature over 100.5 degrees · Nausea and vomiting lasting longer than 4 hours or if unable to take medication · Any signs of decreased circulation or nerve impairment to extremity:  Change in color, persistent numbness, tingling, coldness or increased pain. · Any questions Introducing [facility]! Te Krishnan introduces [facility] patient portal. Now you can access parts of your medical record, email your doctor's office, and request medication refills online. 1. In your internet browser, go to [email or web address] [email or web address]" 2. Click on the First Time User? Click Here link in the Sign In box. You will see the New Member Sign Up page. 3. Enter your SWEEPiO Access Code exactly as it appears below. You will not need to use this code after youve completed the sign-up process. If you do not sign up before the expiration date, you must request a new code. · SWEEPiO Access Code: [email or web address] Expires: 5/6/2018 11:20 AM 
 
4. Enter the last four digits of your Social Security Number (xxxx) and Date of Birth (mm/dd/yyyy) as indicated and click Submit. You will be taken to the next sign-up page. 5. Create a Neighbor[place]t ID. This will be your SWEEPiO login ID and cannot be changed, so think of one that is secure and easy to remember. 6. Create a SWEE[employer]O password. You can change your password at any time. 7. Enter your Password Reset Question and Answer. This can be used at a later time if you forget your password. 8. Enter your e-mail address. You will receive e-mail notification when new information is available in [address] 9. Click Sign Up. You can now view and download portions of your medical record. 10. Click the Download Summary menu link to download a portable copy of your medical information. If you have questions, please visit the Frequently Asked Questions section of the SWEEPiO website. Remember, SWEEPiO is NOT to be used for urgent needs. For medical emergencies, dial 911. Now available from your iPhone and Android! Providers Seen During Your Hospitalization Provider Specialty Primary office phone David Lee MD Colon and Rectal Surgery [phone] Your Primary Care Physician (PCP) Primary Care Physician Office Phone Office Fax [address] [facility] [phone] You are allergic to the following Allergen Reactions Heparin (Porcine) Other (comments) Was told when in the hospital that if he received heparin again that it would be deadly. Recent Documentation Height Weight BMI Smoking Status 1.778 m 85.7 kg 27.12 kg/m2 Former Smoker Emergency Contacts Name Discharge Info Relation Home Work Mobile Ofelia Vora DISCHARGE CAREGIVER [3] Spouse [3] [phone] Patient Belongings The following personal items are in your possession at time of discharge: 
  Dental Appliances: Lowers, Uppers Discharge Instructions Attachments/References MEFS - OXYCODONE, RAPID RELEASE (ETH-OXYDOSE, OXY IR, ROXICODONE) - (BY MOUTH) (ENGLISH) Patient Handouts Oxycodone, Rapid Release (ETH-Oxydose, Oxy IR, Roxicodone) - (By mouth) Why this medicine is used:  
Treats moderate to severe pain. This medicine is a narcotic pain reliever. Contact a nurse or doctor right away if you have: 
· Fast or slow heart beat, shallow breathing, blue lips, skin or fingernails · Anxiety, restlessness, fever, sweating, muscle spasms, twitching, seeing or hearing things that are not there · Extreme weakness, shallow breathing, slow heartbeat · Severe confusion, lightheadedness, dizziness, fainting · Sweating or cold, clammy skin, seizures · Severe constipation, stomach pain, nausea, vomiting Common side effects: · Mild constipation · Sleepiness, tiredness © 2017 [address] Information is for End User's use only and may not be sold, redistributed or otherwise used for commercial purposes. Please provide this summary of care documentation to your next provider. Signatures-by signing, you are acknowledging that this After Visit Summary has been reviewed with you and you have received a copy. Patient Signature:  ____________________________________________________________ Date:  ____________________________________________________________  
  
Jeanette Sleeper Provider Signature:  ____________________________________________________________ Date:  ____________________________________________________________

## 2018-03-13 NOTE — BRIEF OP NOTE
BRIEF OPERATIVE NOTE    Date of Procedure:  3/13/2018   Preoperative Diagnosis:  Hypertrophic anal papilla  Postoperative Diagnosis:  Hemorrhoids and hypertrophic anal papilla. Procedure:  Single column hemorrhoidectomy. Surgeon:  Cassie Donovan MD  Assistant:  Leslie Mendez. ELISSA Nielsen  Anesthesia:  MAC with local (0.5% bupivacaine with epinephrine)  Estimated Blood Loss:  < 10 mL  Crystalloid:  1000 mL  Specimens:   ID Type Source Tests Collected by Time Destination   Hemorroids and hypertropic anal papilla Fresh Anus  Cassie Donovan MD 3/13/2018 133[facility]ology     Tubes and Drains:  None. Findings:  Prolapsing hypertrophic anal papilla on a column of anterior midline internal and external hemorrhoids. Complications: None apparent. Implants:  None.

## 2018-03-13 NOTE — ANESTHESIA PREPROCEDURE EVALUATION
Anesthetic History   No history of anesthetic complications            Review of Systems / Medical History  Patient summary reviewed, nursing notes reviewed and pertinent labs reviewed    Pulmonary  Within defined limits                 Neuro/Psych   Within defined limits           Cardiovascular          CHF  Dysrhythmias : atrial fibrillation  CAD         GI/Hepatic/Renal  Within defined limits              Endo/Other    Diabetes         Other Findings            Physical Exam    Airway  Mallampati: II  TM Distance: > 6 cm  Neck ROM: normal range of motion   Mouth opening: Normal     Cardiovascular  Regular rate and rhythm,  S1 and S2 normal,  no murmur, click, rub, or gallop             Dental  No notable dental hx       Pulmonary  Breath sounds clear to auscultation               Abdominal  GI exam deferred       Other Findings            Anesthetic Plan    ASA: 3  Anesthesia type: MAC          Induction: Intravenous  Anesthetic plan and risks discussed with: Patient

## 2018-03-13 NOTE — DISCHARGE INSTRUCTIONS
Post-Operative Instructions      1. Diet:  Consume a high fiber diet as tolerated. Such a diet may include fresh fruits, vegetables, and whole grain cereals and breads. You should also drink 8-10 glasses of water, juice, or other non-alcoholic beverages per day. 2. Fiber Supplements:  Take 1 dose of Metamucil or other over-the-counter fiber supplement (Citrucel, BeneFiber, etc.) as directed each morning and another dose each evening. 3. Medications: Take pain medication as prescribed. Do not drive, drink alcohol, or operate machinery while taking prescription pain medication. Take docusate (non-prescription stool softener) twice per day as directed. Beginning on the second day after surgery, you may take ibuprofen as directed in addition to or instead of the prescription medication if there has been no significant bleeding. Do not take pain medication on an empty stomach. Do not take aspirin for 10 days following the procedure. You should restart your Plavix on 3/19/2018 if there has not been any significant bleeding. If you are not sure whether or not to take it, please call me. 4. Activity:  Do not engage in any heavy lifting or other strenuous activity until you have been seen for follow-up. You may walk as much as you want, and you may climb stairs. Frequent walking will help prevent constipation. 5. Sitz Baths (soaking):  Remove the dressing on the first day after surgery or just before your next bowel movement (whichever comes first), then begin performing sitz baths 3 times per day and after bowel movements. The water should be as hot as you can tolerate, and you should soak for no longer than 20 minutes at a time. Do not wipe the anal area with dry toilet tissue; instead, use baby wipes. After sitz baths, cover the anal area with a gauze dressing. You may also apply Neosporin, Neosporin + Pain Relief, or Nupercainal ointment (a topical anesthetic).     6. Constipation:  If more than one day passes without a bowel movement, take 1 Tablespoon of Milk of Magnesia. Repeat in 6 hours if you have no results. If taking the second dose of the Milk of Magnesia does not result in a bowel movement, take two to four 5 mg Dulcolax (bisacodyl) tablets. If you have not had a bowel movement by the next morning after taking the bisacodyl, call my office. 7. Bleeding:  A small amount of bright red bleeding can be expected for the next several days. If bleeding appears to be greater than a cup or seems to be continuous, call my office. 8. Other Problems:  If you experience intolerable pain, fever (temperature of 101 or more), or inability to urinate, call my office. 9. Questions: If you have any questions about your post-operative condition or care, do not hesitate to call my office. 10. Other: For anything other than scheduling, please call [phone]. 11.  Follow-up:  Please return to my office at 9:30 AM on Tuesday 3/27/2018. If you need to change the appointment, please call [phone] on a weekday between 9:00 AM and noon. Anshu Rodriguez M.D.  [phone]    ______________________________________________________________________    Anesthesia Discharge Instructions    After general anesthesia or intervenous sedation, for 24 hours or while taking prescription Narcotics:  · Limit your activities  · Do not drive or operate hazardous machinery  · If you have not urinated within 8 hours after discharge, please contact your surgeon on call.   · Do not make important personal or business decisions  · Do not drink alcoholic beverages    Report the following to your surgeon:  · Excessive pain, swelling, redness or odor of or around the surgical area  · Temperature over 100.5 degrees  · Nausea and vomiting lasting longer than 4 hours or if unable to take medication  · Any signs of decreased circulation or nerve impairment to extremity:  Change in color, persistent numbness, tingling, coldness or increased pain.   · Any questions

## 2018-03-14 NOTE — OP NOTES
[address]  ACUTE CARE OP NOTE    Name:KAYLEN Neri  MR#: [id number]  : 1950  ACCOUNT #: [de-identified]    DATE OF SERVICE: 2018    PREOPERATIVE DIAGNOSIS:  Hypertrophic anal papilla. POSTOPERATIVE DIAGNOSES:  Hemorrhoids and hypertrophic anal papilla. PROCEDURES PERFORMED:  Single column hemorrhoidectomy with excision of hypertrophic anal papilla. SURGEON:  Keitha Pallas, MD.    Olya Mckee. Mitcheal Locks. ANESTHESIA:  MAC with local (0.5% bupivacaine with epinephrine). SPECIMENS REMOVED:  Hemorrhoids and hypertrophic anal papilla. TUBES AND DRAINS:  None. ESTIMATED BLOOD LOSS:  Less than 10 mL. CRYSTALLOID:  1000 mL. COMPLICATIONS:  None apparent. IMPLANTS:  None. INDICATION FOR PROCEDURE:  The patient is a 61-year-old male who underwent a colonoscopy performed by Dr. Marla Burgos on 2018. The procedure was performed for colorectal cancer screening, and it revealed mild diverticulosis and several small polyps. All of the polyps that were removed proved to have been tubular adenomas. In the rectum/anal canal, below the dentate line, there was a polypoid lesion that was identified among a background of internal hemorrhoids. Because of its location, that lesion was not biopsied. The patient experiences some prolapse of tissue from the anus at times, and he recalls having had a small amount of rectal bleeding in 2017. He usually moves his bowels 1-2 times per day and he has no anorectal pain or fecal incontinence. Examination of the perineum in the office on 2018 revealed a prolapsing, pedunculated, polypoid lesion that appeared to be most consistent with a hypertrophic anal papilla. The lesion was reducible, and on anoscopy it was observed to have a narrow base of approximately 7 mm. There were also some enlarged internal hemorrhoids nearby.   Excision was indicated for diagnostic and potentially therapeutic purposes. It was thought that with continued growth of this lesion and repeated prolapse, the patient would be more likely to experience significant bleeding, especially because he is normally on Plavix. The risks of surgery were explained in detail and the patient agreed to proceed. OPERATIVE FINDINGS:  There was a prolapsing hypertrophic anal papilla that was arising from the epithelium covering a column of anterior midline internal and external hemorrhoids. DESCRIPTION OF PROCEDURE:  After informed consent was obtained, the patient was taken to the operating room where standard monitoring devices were attached. He had elected to undergo the procedure under MAC. He was therefore allowed to position himself on the operating table. He was positioned prone supported by pillows, and because he was unable to extend his right upper extremity sufficiently, that extremity was left at his side. The lower extremities were fitted with pneumatic compression stockings. The buttocks were retracted bilaterally using tape. The patient was sedated by the anesthetist.  The perineum was prepped and draped in the usual sterile fashion. Visual inspection and digital rectal examination were performed. Exposure within the anal canal was facilitated using the large Hill-Benavidse retractor. The lesion described above was identified. Because the hypertrophic anal papilla was located on top of the hemorrhoidal column, it was necessary to excise that hemorrhoidal column in order to excise the papilla without excessive bleeding. 0.5% bupivacaine with epinephrine was infiltrated, then a 3-0 Vicryl suture was placed proximally to control the vascular pedicle of the internal hemorrhoidal component. The tissue complex was then sharply excised using scalpel and scissors. The resulting wound was closed with running and interrupted 3-0 Vicryl sutures.   Final inspection revealed good hemostasis and 0.5% bupivacaine with epinephrine was infiltrated to provide some postoperative analgesia. Three small strips of Gelfoam were inserted into the anal canal and an external dressing consisting of Xeroform, 4 x 4's, and an ABD was put in place. There were no apparent complications, and the patient appeared to have tolerated the procedure well. At its conclusion, he was extubated and transported in stable condition to the recovery room.         Boom Lyons MD       PG / GN  D: 03/14/2018 03:46     T: 03/14/2018 09:24  JOB #: [id number]  CC: Erlin Rodriguez MD  CC: Dileep Aguilar MD

## 2018-03-15 ENCOUNTER — DOCUMENTATION ONLY (OUTPATIENT)
Dept: FAMILY MEDICINE CLINIC | Age: 68
End: 2018-03-15

## 2018-03-15 NOTE — PROGRESS NOTES
[facility] was placed on Dr Ariana Doll to process.

## 2018-03-22 ENCOUNTER — OFFICE VISIT (OUTPATIENT)
Dept: CARDIOLOGY CLINIC | Age: 68
End: 2018-03-22

## 2018-03-22 DIAGNOSIS — Z95.810 CARDIAC DEFIBRILLATOR IN SITU: Primary | ICD-10-CM

## 2018-03-28 ENCOUNTER — TELEPHONE (OUTPATIENT)
Dept: CARDIOLOGY CLINIC | Age: 68
End: 2018-03-28

## 2018-03-28 NOTE — TELEPHONE ENCOUNTER
Telephone Call RE:  Appointment reminder     Outcome:     [x] Patient confirmed appointment   [] Patient rescheduled appointment for    [] Unable to reach   [] Left message              [] Other:       Kenny Kitchen

## 2018-03-29 ENCOUNTER — OFFICE VISIT (OUTPATIENT)
Dept: CARDIOLOGY CLINIC | Age: 68
End: 2018-03-29

## 2018-03-29 VITALS
WEIGHT: 192 LBS | BODY MASS INDEX: 27.55 KG/M2 | HEART RATE: 72 BPM | RESPIRATION RATE: 18 BRPM | SYSTOLIC BLOOD PRESSURE: 120 MMHG | DIASTOLIC BLOOD PRESSURE: 68 MMHG | OXYGEN SATURATION: 98 %

## 2018-03-29 DIAGNOSIS — I50.22 SYSTOLIC CHF, CHRONIC (HCC): Primary | ICD-10-CM

## 2018-03-29 DIAGNOSIS — I25.5 ISCHEMIC CARDIOMYOPATHY: ICD-10-CM

## 2018-03-29 DIAGNOSIS — R06.02 SOB (SHORTNESS OF BREATH): ICD-10-CM

## 2018-03-29 DIAGNOSIS — I25.10 CORONARY ARTERY DISEASE INVOLVING NATIVE CORONARY ARTERY OF NATIVE HEART WITHOUT ANGINA PECTORIS: ICD-10-CM

## 2018-03-29 DIAGNOSIS — I48.0 PAROXYSMAL ATRIAL FIBRILLATION (HCC): ICD-10-CM

## 2018-03-29 RX ORDER — CLOPIDOGREL BISULFATE 75 MG/1
TABLET ORAL
COMMUNITY
End: 2018-09-27

## 2018-03-29 RX ORDER — SPIRONOLACTONE 25 MG/1
25 TABLET ORAL DAILY
Qty: 90 TAB | Refills: 1 | Status: SHIPPED | OUTPATIENT
Start: 2018-03-29 | End: 2018-11-19

## 2018-03-29 RX ORDER — GUAIFENESIN 100 MG/5ML
81 LIQUID (ML) ORAL DAILY
COMMUNITY
End: 2020-01-01

## 2018-03-29 NOTE — COMMUNICATION BODY
[facility] Progress Note      DOS:  3/29/2018     PRIMARY CARE PHYSICIAN: Mandeep Blum MD  CARDIOLOGIST: Dr. Forest Siddiqui   EP: Shailesh Mcdonald MD       Impression / Plan:  1. Chronic systolic heart failure (EF 25%), d/t ICM, ACC/AHA Stage D, NYHA class II-III off milrinone sine May, activity limited by    Continue coreg, entresto, aldactone   Prn bumex   Labs today    Continue Veltassa   Continue Bumex PRN - has not needed in the past month    Encouraged to be more active    Continue daily weights, sodium and fluid restriction   Follow up in 2 months, sooner for problems    DMV form completed      2. CAD s/p PCI- he has seen Dr. Monse Dias    Continue ASA, Plavix, Coreg and statin per cardiology.         3. CKD- Cr stable 1.85   monitor      4. PAF-- remains SR   Amiodarone to 200 mg daily- LFT, TSH OK   Amio level per Dr [place] Rodriguez to follow    No anticoag 2/2 GI bleed/AVMs         5. PAD   Encouraged to exercise and consider cardiac rehab   Defer to Dr. Monse Dias or discussed seeing Dr. Kevin Rivas ( vascular surgeon)     6. H/O lower GIB/s/p colorectal surgery       7. Diabetes   Insulin management by PCP    8. XOL- per PCP    HF Education: Continue daily weights (in the morning, after voiding). Notify HF team of overnight weight gains > 2 lbs or weekly >5 lbs or if any of the following Sx. Continue to limit sodium intake & monitor your fluid intake (not to exceed 2L per day). I have discussed the diagnosis with  Telma Mccormick and the intended plan as seen in the above orders. Questions were answered concerning future plans. I have discussed medication side effects and warnings with the patient as well. Thank you for allowing me to participate in the care of this delightful  gentleman. Please do not hesitate to call with any questions. Opal Lobato,  RN, ACNP-BC, [facility]         Chief Complaint   Patient presents with    Follow Up Chronic Condition     HPI: 74y.o. year old male with a history of chronic systolic heart failure secondary to ICM. His history is also pertinent for CAD s/p PCI of LM,  of RCA, PAF (amiodarone)s/p ICD, Hep C, past GI bleed, and remote tobacco abuse. He presents to clinic today for follow up of his heart failure. He was last seen 2 months ago, at that time his amiodarone was decreased to 200 mg daily. proBNP was 9200. He is feeling well. Since his last visit he has undergone colorectal surgery for polyp/hemmerhoid removal. He has been seen by [facility] for genetic counseling. He has had no colon CA by multiple polyps and significant family hx. He has also seen Dr. Varun Aguilar as his new cardiologist.    He has been off Milrinone since End of May 2017 and feeling well. He remains active, he notes NAVARRO with significant exertion- if he is rushing and going up an incline. He does not exercise regularly but stays busy with errands, house and yard maintenance. He is not interested in cardiac rehab. His weight has remained stable, mild weight gain over the past several months  2/2 food intake. He has not used bumex for past 2 months. He has chronic claudication that is getting some worse, that limity. The sx resolve with rest.    Denies exertional chest pain, palpitations, orthopnea, PND, syncope, near syncope, no AICD shocks, no bleeding. He is compliant with medications, low NA diet. He limits his K rich food intake. He is sleeping well, appetite good. CARDIAC EVALUATION   ECHO (2/13/17): EF 10% severe GHK, PSM. Dil LA. Mod MR. Mild to mod TR. Left pleural effusion  ECHO (1/19/17): EF 5-10% with severe GHK,. Mildly dil LA. Mild TR. PASP 46. ECHO: (5/15/17) EF 25%, severe GHK, basal-mid inferior AK, severe LAE, Mod MR, Mild TR,      CATH (1/20/17): LM ost70. LAD m50. D1 80. LCx d70; OM1 99, OM2 90. RCA p100. No AVG. PAP 53/27/36. --- s/p PCI (2/9/17): pRCA 2.5x38 Xience + 2.75x12 Xience. ostLM 4.0x12 Xience post-dil with 4.5x12 NC.     ICD (6/12/17, Anup): [employer]    CARDIAC MRI 1/31/17:LVEF 10%, LVH, RV dilated RVEF 25% GHK, marked LAE, mod CB, mild MR, mod-severe TR,  The entire LAD territory : largely viable and should recover upon  revascularization. The entire RCA territory is largely viable and should recover  upon revascularization. The LCx territory demonstrate medium-size infarct with  limited viability. Large left-sided pleural effusion with passive atelectasis of the left lung. RICKY (3/20/17): R: 0.58, L: 0.56      ROS:    General:  negative  HEENT: Constant runny nose   Pulmonary: no cough, shortness of breath, or wheezing  Cardiac: Per HPI   GI:  Negative   Musculo: Arthritis, some neck pain  Neuro: negative  Skin:  negative      History:  Past Medical History:   Diagnosis Date    Arthritis     hands/fingers    CAD (coronary artery disease)     involving native coronary artery of native heart without angina pectoris    Cardiomyopathy ([address] 01/20/2017    A. Echo (1/19/17):  EF 5-10% with severe GHK,. Mildly dil LA. Mild TR. PASP 46./systemic cardiomyopathy    Chronic renal insufficiency 03/06/2017    Diabetes mellitus ([address] 3/6/2017    IDDM    Dyslipidemia 3/6/2017    A. FLP (1/19/17): Tot 120, , HDL 19, LDL 76 (no Rx).  H/O: GI bleed 3/6/2017    Hepatitis C 3/6/2017    Ill-defined condition     hypokalemia    Ill-defined condition     flu 1/2018     Paroxysmal atrial fibrillation ([address] 3/6/2017    Peripheral vascular disease ([address] 9/18/2017    A. RICKY (3/20/17): Right 0.58, Left 0.56. Past Surgical History:   Procedure Laterality Date    COLONOSCOPY N/A 2/17/2017    COLONOSCOPY performed by Kassandra Mcconnell. Christina Clark MD at [address] COLONOSCOPY N/A 2/5/2018    COLONOSCOPY performed by Kassandra Mcconnell.  Christina Clark MD at [facility] ENDOSCOPY    HX COLONOSCOPY      [facility] 2/2017    HX GI      colonoscopy x 3 - last 2/2017 - polyp    HX HEART CATHETERIZATION      3 heart stents    HX OTHER SURGICAL  05/2017    AICD    HX PACEMAKER      AICD     Social History     Social History    Marital status:      Spouse name: N/A    Number of children: N/A    Years of education: N/A     Occupational History    Not on file. Social History Main Topics    Smoking status: Former Smoker     Packs/day: 2.00     Years: 45.00     Quit date: 2017    Smokeless tobacco: Never Used    Alcohol use No    Drug use: Yes     Special: Marijuana    Sexual activity: No     Other Topics Concern    Not on file     Social History Narrative     Family History   Problem Relation Age of Onset    Hypertension Mother     Heart Disease Mother      MURMUR    Cancer Father      COLON    Colon Cancer Father     Other Sister      born totally handicapped/epileptic    Kidney Disease Sister       from renal shutdown    Heart Disease Brother     Other Brother      ?pancreatic cancer or ?pancreatitis    Cancer Maternal Uncle      toes and spread    Cancer Paternal Uncle      stomach    Heart Attack Maternal Grandfather 47    Cancer Paternal Grandfather      bone    Cancer Maternal Uncle      stomach    Cancer Maternal Uncle      lung    Anesth Problems Neg Hx        Current Medications:   Current Outpatient Prescriptions   Medication Sig Dispense Refill    spironolactone (ALDACTONE) 25 mg tablet Take 1 Tab by mouth daily. 90 Tab 1    clopidogrel (PLAVIX) 75 mg tab Take  by mouth.  aspirin 81 mg chewable tablet Take 81 mg by mouth daily.  DULoxetine (CYMBALTA) 30 mg capsule Take 1 Cap by mouth daily. (Patient taking differently: Take 30 mg by mouth daily (with dinner). ) 90 Cap 3    amiodarone (CORDARONE) 200 mg tablet Take 1 Tab by mouth daily. Indications: PREVENTION OF RECURRENT ATRIAL FIBRILLATION 30 Tab 6    insulin NPH/insulin regular (NOVOLIN 70/30) 100 unit/mL (70-30) injection 10 Units by SubCUTAneous route Daily (before breakfast).       insulin NPH/insulin regular (NOVOLIN 70/30) 100 unit/mL (70-30) injection 8 Units by SubCUTAneous route Daily (before dinner).  carvedilol (COREG) 3.125 mg tablet TAKE ONE TABLET BY MOUTH TWICE DAILY WITH MEALS 60 Tab 4    atorvastatin (LIPITOR) 10 mg tablet TAKE ONE TABLET BY MOUTH ONCE DAILY (Patient taking differently: Take 10 mg by mouth nightly. TAKE ONE TABLET BY MOUTH ONCE DAILY) 30 Tab 4    sacubitril-valsartan (ENTRESTO) 97 mg/103 mg tablet Take 1 Tab by mouth two (2) times a day. 180 Tab 4    patiromer calcium sorbitex (VELTASSA) 8.4 gram powder Take 8.4 g by mouth daily. 4 Packet 0    bumetanide (BUMEX) 1 mg tablet Take 0.5 mg by mouth as needed (fluid retention).  acetaminophen (TYLENOL EXTRA STRENGTH) 500 mg tablet Take 1,000 mg by mouth every six (6) hours as needed for Pain. Indications: BACK PAIN      Insulin Syringes, Disposable, 1 mL syrg Pt to take 10 units w/ breakfast and 8 units w/ dinner 500 Syringe 1    oxyCODONE IR (ROXICODONE) 5 mg immediate release tablet Take 1-2 Tabs by mouth every four (4) hours as needed for Pain. Max Daily Amount: 60 mg. 40 Tab 0       Allergies: Allergies   Allergen Reactions    Heparin (Porcine) Other (comments)     Was told when in the hospital that if he received heparin again that it would be deadly. Physical Exam:   Physical Exam   Constitutional: A&O x 2, well developed, WM in NAD  -older than his stated age   HENT[de-identified] Normocephalic and atraumatic. mucous membranes pink, moist   Neck: Neck supple. No lymphadenopathy   Cardiovascular: PMI nondisplaced, AICD R infraclavicular space, RRR, S1 & S2, 2/6 systolic murmur at apex, no S3, JVP 10 cm, mild  HJR. Pulmonary/Chest: Effort normal and breath sounds normal. No respiratory distress. Abdominal: Soft. He exhibits no distension. There is no tenderness. Neurological: He is alert and oriented to person, place, and time. Skin: Skin is warm and dry. Psychiatric: He has a normal mood and affect.        Vitals:    Visit Vitals    /68    Pulse 72    Resp 18  Wt 192 lb (87.1 kg)    SpO2 98%    BMI 27.55 kg/m2           Recent Labs:   Lab Results   Component Value Date/Time    WBC 7.8 03/08/2018 02:42 PM    HGB 13.8 03/08/2018 02:42 PM    HCT 42.0 03/08/2018 02:42 PM    PLATELET 404 91/02/0959 02:42 PM    MCV 87.0 03/08/2018 02:42 PM     Lab Results   Component Value Date/Time    TSH 3.040 12/04/2017 10:17 AM      Lab Results   Component Value Date/Time    BNP 1209 (H) 01/21/2017 12:25 PM    NT pro-BNP 6258 (H) 01/31/2017 04:52 AM    NT pro-BNP 6094 (H) 01/30/2017 04:16 AM    NT pro-BNP 6168 (H) 01/29/2017 04:18 AM    NT pro-BNP 6876 (H) 01/28/2017 03:38 AM    NT pro-BNP 5427 (H) 01/27/2017 03:56 AM      Lab Results   Component Value Date/Time    Sodium 140 03/08/2018 02:42 PM    Potassium 4.8 03/08/2018 02:42 PM    Chloride 106 03/08/2018 02:42 PM    CO2 28 03/08/2018 02:42 PM    Anion gap 6 03/08/2018 02:42 PM    Glucose 89 03/08/2018 02:42 PM    BUN 28 (H) 03/08/2018 02:42 PM    Creatinine 1.85 (H) 03/08/2018 02:42 PM    BUN/Creatinine ratio 15 03/08/2018 02:42 PM    GFR est AA 45 (L) 03/08/2018 02:42 PM    GFR est non-AA 37 (L) 03/08/2018 02:42 PM    Calcium 9.1 03/08/2018 02:42 PM    Bilirubin, total 0.4 03/05/2018 10:07 AM    ALT (SGPT) 25 03/05/2018 10:07 AM    AST (SGOT) 24 03/05/2018 10:07 AM    Alk.  phosphatase 59 03/05/2018 10:07 AM    Protein, total 6.9 03/05/2018 10:07 AM    Albumin 4.2 03/05/2018 10:07 AM    Globulin 4.4 (H) 02/22/2017 03:16 AM    A-G Ratio 1.6 03/05/2018 10:07 AM      Lab Results   Component Value Date/Time    Hemoglobin A1c 6.4 (H) 03/05/2018 10:07 AM    Hemoglobin A1c (POC) 6.4 08/17/2017 11:00 AM                  [facility] [address] [place] [address] [address]  [address] [place] [address]  [phone]  24 hour VAD/HF Pager: [phone]

## 2018-03-29 NOTE — LETTER
3/29/2018 3:05 PM 
 
Patient:  Michell Napier. YOB: 1950 Date of Visit: 3/29/2018 Dear Dean Becerra MD 
[address] [facility] [address] [facility] [phone] VIA Facsimile: [phone] Carlee Lesches, MD 
[address] [address] VIA In Basket Negro De La Vega MD 
[address] [facility] [address] VIA In Basket Dahlia Avitia MD 
[address] [facility] [address] [address] VIA In Basket 
 : Thank you for referring Mr. Chuck Michel to me for evaluation/treatment. Below are the relevant portions of my assessment and plan of care. [facility] Progress Note DOS:  3/29/2018 PRIMARY CARE PHYSICIAN: Carlee Lesches, MD 
CARDIOLOGIST: Dr. Dahlia Avitia  
EP: Lakesha Markham MD  
 
 
Impression / Plan: 1. Chronic systolic heart failure (EF 25%), d/t ICM, ACC/AHA Stage D, NYHA class II-III off milrinone sine May, activity limited by  
 Continue coreg, entresto, aldactone Prn bumex Labs today Continue Veltassa Continue Bumex PRN - has not needed in the past month Encouraged to be more active Continue daily weights, sodium and fluid restriction Follow up in 2 months, sooner for problems DMV form completed 
   
2. CAD s/p PCI- he has seen Dr. Je Beasley  
 Continue ASA, Plavix, Coreg and statin per cardiology.    
   
3. CKD- Cr stable 1.85 
 monitor 
   
4. PAF-- remains SR Amiodarone to 200 mg daily- LFT, TSH OK Amio level per Dr Jennifer Lovelace to follow No anticoag 2/2 GI bleed/AVMs 
  
   
5. PAD Encouraged to exercise and consider cardiac rehab Defer to Dr. Je Beasley or discussed seeing Dr. Jess Coates ( vascular surgeon) 6. H/O lower GIB/s/p colorectal surgery 7. Diabetes Insulin management by PCP 8. XOL- per PCP 
 
HF Education: Continue daily weights (in the morning, after voiding).   Notify HF team of overnight weight gains > 2 lbs or weekly >5 lbs or if any of the following Sx. Continue to limit sodium intake & monitor your fluid intake (not to exceed 2L per day). I have discussed the diagnosis with  Santy Michelle. and the intended plan as seen in the above orders. Questions were answered concerning future plans. I have discussed medication side effects and warnings with the patient as well. Thank you for allowing me to participate in the care of this delightful  gentleman. Please do not hesitate to call with any questions. Opal Salamanca RN, ACNP-BC, [facility] Chief Complaint Patient presents with  Follow Up Chronic Condition HPI: 74y.o. year old male with a history of chronic systolic heart failure secondary to ICM. His history is also pertinent for CAD s/p PCI of LM,  of RCA, PAF (amiodarone)s/p ICD, Hep C, past GI bleed, and remote tobacco abuse. He presents to clinic today for follow up of his heart failure. He was last seen 2 months ago, at that time his amiodarone was decreased to 200 mg daily. proBNP was 9200. He is feeling well. Since his last visit he has undergone colorectal surgery for polyp/hemmerhoid removal. He has been seen by [facility] for genetic counseling. He has had no colon CA by multiple polyps and significant family hx. He has also seen Dr. Marilyn Hamilton as his new cardiologist.  
 He has been off Milrinone since End of May 2017 and feeling well. He remains active, he notes NAVARRO with significant exertion- if he is rushing and going up an incline. He does not exercise regularly but stays busy with errands, house and yard maintenance. He is not interested in cardiac rehab. His weight has remained stable, mild weight gain over the past several months  2/2 food intake. He has not used bumex for past 2 months. He has chronic claudication that is getting some worse, that limity.  The sx resolve with rest. 
 
Denies exertional chest pain, palpitations, orthopnea, PND, syncope, near syncope, no AICD shocks, no bleeding. He is compliant with medications, low NA diet. He limits his K rich food intake. He is sleeping well, appetite good. CARDIAC EVALUATION  
ECHO (2/13/17): EF 10% severe GHK, PSM. Dil LA. Mod MR. Mild to mod TR. Left pleural effusion ECHO (1/19/17): EF 5-10% with severe GHK,. Mildly dil LA. Mild TR. PASP 46. ECHO: (5/15/17) EF 25%, severe GHK, basal-mid inferior AK, severe LAE, Mod MR, Mild TR, CATH (1/20/17): LM ost70. LAD m50. D1 80. LCx d70; OM1 99, OM2 90. RCA p100. No AVG. PAP 53/27/36. --- s/p PCI (2/9/17): pRCA 2.5x38 Xience + 2.75x12 Xience. ostLM 4.0x12 Xience post-dil with 4.5x12 NC. ICD (6/12/17, Anup): [address] CARDIAC MRI 1/31/17:LVEF 10%, LVH, RV dilated RVEF 25% GHK, marked LAE, mod CB, mild MR, mod-severe TR,  The entire LAD territory : largely viable and should recover upon 
revascularization. The entire RCA territory is largely viable and should recover 
upon revascularization. The LCx territory demonstrate medium-size infarct with 
limited viability. Large left-sided pleural effusion with passive atelectasis of the left lung. RICKY (3/20/17): R: 0.58, L: 0.56 
 
 
ROS:   
General:  negative HEENT: Constant runny nose Pulmonary: no cough, shortness of breath, or wheezing Cardiac: Per HPI  
GI:  Negative Musculo: Arthritis, some neck pain Neuro: negative Skin:  negative History: 
Past Medical History:  
Diagnosis Date  Arthritis   
 hands/fingers  CAD (coronary artery disease)   
 involving native coronary artery of native heart without angina pectoris  [facility] 01/20/2017 A. Echo (1/19/17):  EF 5-10% with severe GHK,. Mildly dil LA. Mild TR. PASP 46./systemic cardiomyopathy  Chronic renal insufficiency 03/06/2017  Diabetes mellitus ([address] 3/6/2017 IDDM  Dyslipidemia 3/6/2017 A. FLP (1/19/17): Tot 120, , HDL 19, LDL 76 (no Rx).  H/O: GI bleed 3/6/2017  Hepatitis C 3/6/2017  Ill-defined condition   
 hypokalemia  Ill-defined condition   
 flu 2018  Paroxysmal atrial fibrillation ([facility] 3/6/2017  Peripheral vascular disease ([facility] 2017 A. RICKY (3/20/17): Right 0.58, Left 0.56. Past Surgical History:  
Procedure Laterality Date  COLONOSCOPY N/A 2017 COLONOSCOPY performed by Fam Servin. Vani Asher MD at [facility] ENDOSCOPY  COLONOSCOPY N/A 2018 COLONOSCOPY performed by Fam Servin. Vani Asher MD at [facility] ENDOSCOPY  
 HX COLONOSCOPY [facility] 2017  HX GI    
 colonoscopy x 3 - last 2017 - polyp  HX HEART CATHETERIZATION    
 3 heart stents  HX OTHER SURGICAL  2017 AICD  HX PACEMAKER    
 AICD Social History Social History  Marital status:  Spouse name: N/A  
 Number of children: N/A  
 Years of education: N/A Occupational History  Not on file. Social History Main Topics  Smoking status: Former Smoker Packs/day: 2.00 Years: 45.00 Quit date: 2017  Smokeless tobacco: Never Used  Alcohol use No  
 Drug use: Yes Special: Marijuana  Sexual activity: No  
 
Other Topics Concern  Not on file Social History Narrative Family History Problem Relation Age of Onset  Hypertension Mother  Heart Disease Mother MURMUR  
 Cancer Father COLON  
 Colon Cancer Father  Other Sister   
  born totally handicapped/epileptic  Kidney Disease Sister   
   from renal shutdown  Heart Disease Brother  Other Brother ?pancreatic cancer or ?pancreatitis  Cancer Maternal Uncle   
  toes and spread  Cancer Paternal Uncle   
  stomach  
 Heart Attack Maternal Grandfather 47  Cancer Paternal Grandfather   
  bone  Cancer Maternal Uncle   
  stomach  Cancer Maternal Uncle   
  lung  Anesth Problems Neg Hx Current Medications:  
Current Outpatient Prescriptions Medication Sig Dispense Refill  spironolactone (ALDACTONE) 25 mg tablet Take 1 Tab by mouth daily. 90 Tab 1  clopidogrel (PLAVIX) 75 mg tab Take  by mouth.  aspirin 81 mg chewable tablet Take 81 mg by mouth daily.  DULoxetine (CYMBALTA) 30 mg capsule Take 1 Cap by mouth daily. (Patient taking differently: Take 30 mg by mouth daily (with dinner). ) 90 Cap 3  
 amiodarone (CORDARONE) 200 mg tablet Take 1 Tab by mouth daily. Indications: PREVENTION OF RECURRENT ATRIAL FIBRILLATION 30 Tab 6  
 insulin NPH/insulin regular (NOVOLIN 70/30) 100 unit/mL (70-30) injection 10 Units by SubCUTAneous route Daily (before breakfast).  insulin NPH/insulin regular (NOVOLIN 70/30) 100 unit/mL (70-30) injection 8 Units by SubCUTAneous route Daily (before dinner).  carvedilol (COREG) 3.125 mg tablet TAKE ONE TABLET BY MOUTH TWICE DAILY WITH MEALS 60 Tab 4  
 atorvastatin (LIPITOR) 10 mg tablet TAKE ONE TABLET BY MOUTH ONCE DAILY (Patient taking differently: Take 10 mg by mouth nightly. TAKE ONE TABLET BY MOUTH ONCE DAILY) 30 Tab 4  
 sacubitril-valsartan (ENTRESTO) 97 mg/103 mg tablet Take 1 Tab by mouth two (2) times a day. 180 Tab 4  patiromer calcium sorbitex (VELTASSA) 8.4 gram powder Take 8.4 g by mouth daily. 4 Packet 0  
 bumetanide (BUMEX) 1 mg tablet Take 0.5 mg by mouth as needed (fluid retention).  acetaminophen (TYLENOL EXTRA STRENGTH) 500 mg tablet Take 1,000 mg by mouth every six (6) hours as needed for Pain. Indications: BACK PAIN    
 Insulin Syringes, Disposable, 1 mL syrg Pt to take 10 units w/ breakfast and 8 units w/ dinner 500 Syringe 1  
 oxyCODONE IR (ROXICODONE) 5 mg immediate release tablet Take 1-2 Tabs by mouth every four (4) hours as needed for Pain. Max Daily Amount: 60 mg. 40 Tab 0 Allergies: Allergies Allergen Reactions  Heparin (Porcine) Other (comments) Was told when in the hospital that if he received heparin again that it would be deadly.   
 
 
Physical Exam:  
Physical Exam  
 Constitutional: A&O x 2, well developed, WM in NAD  -older than his stated age HENT[de-identified] Normocephalic and atraumatic. mucous membranes pink, moist  
Neck: Neck supple. No lymphadenopathy Cardiovascular: PMI nondisplaced, AICD R infraclavicular space, RRR, S1 & S2, 2/6 systolic murmur at apex, no S3, JVP 10 cm, mild  HJR. Pulmonary/Chest: Effort normal and breath sounds normal. No respiratory distress. Abdominal: Soft. He exhibits no distension. There is no tenderness. Neurological: He is alert and oriented to person, place, and time. Skin: Skin is warm and dry. Psychiatric: He has a normal mood and affect. Vitals:   
Visit Vitals  /68  Pulse 72  Resp 18  Wt 192 lb (87.1 kg)  SpO2 98%  BMI 27.55 kg/m2 Recent Labs:  
Lab Results Component Value Date/Time WBC 7.8 03/08/2018 02:42 PM  
 HGB 13.8 03/08/2018 02:42 PM  
 HCT 42.0 03/08/2018 02:42 PM  
 PLATELET 584 26/21/2237 02:42 PM  
 MCV 87.0 03/08/2018 02:42 PM  
 
Lab Results Component Value Date/Time TSH 3.040 12/04/2017 10:17 AM  
  
Lab Results Component Value Date/Time BNP 1209 (H) 01/21/2017 12:25 PM  
 NT pro-BNP 6258 (H) 01/31/2017 04:52 AM  
 NT pro-BNP 6094 (H) 01/30/2017 04:16 AM  
 NT pro-BNP 6168 (H) 01/29/2017 04:18 AM  
 NT pro-BNP 6876 (H) 01/28/2017 03:38 AM  
 NT pro-BNP 5427 (H) 01/27/2017 03:56 AM  
  
Lab Results Component Value Date/Time  Sodium 140 03/08/2018 02:42 PM  
 Potassium 4.8 03/08/2018 02:42 PM  
 Chloride 106 03/08/2018 02:42 PM  
 CO2 28 03/08/2018 02:42 PM  
 Anion gap 6 03/08/2018 02:42 PM  
 Glucose 89 03/08/2018 02:42 PM  
 BUN 28 (H) 03/08/2018 02:42 PM  
 Creatinine 1.85 (H) 03/08/2018 02:42 PM  
 BUN/Creatinine ratio 15 03/08/2018 02:42 PM  
 GFR est AA 45 (L) 03/08/2018 02:42 PM  
 GFR est non-AA 37 (L) 03/08/2018 02:42 PM  
 Calcium 9.1 03/08/2018 02:42 PM  
 Bilirubin, total 0.4 03/05/2018 10:07 AM  
 ALT (SGPT) 25 03/05/2018 10:07 AM  
 AST (SGOT) 24 03/05/2018 10:07 AM  
 Alk. phosphatase 59 03/05/2018 10:07 AM  
 Protein, total 6.9 03/05/2018 10:07 AM  
 Albumin 4.2 03/05/2018 10:07 AM  
 Globulin 4.4 (H) 02/22/2017 03:16 AM  
 A-G Ratio 1.6 03/05/2018 10:07 AM  
  
Lab Results Component Value Date/Time Hemoglobin A1c 6.4 (H) 03/05/2018 10:07 AM  
 Hemoglobin A1c (POC) 6.4 08/17/2017 11:00 AM  
  
 
 
 
 
 
 
JENI Ramachandran Roman [address] [place] [phone] 
[phone] 
85 hour VAD/HF Pager: [phone] If you have questions, please do not hesitate to call me. I look forward to following Mr. Holm Sunny along with you. Sincerely, JENI Ramachandran

## 2018-03-29 NOTE — MR AVS SNAPSHOT
[facility] 
 
 
 200 [facility] [address] [place] [address] 
[phone] Patient: Darleen Yates. MRN: [id number] Visit Information Date & Time Provider Department Dept. Phone Encounter #  
 3/29/2018  2:00 PM Ruy Gaines, [address] [id number] Follow-up Instructions Return in about 2 months (around 5/29/2018). Follow-up and Disposition History Your Appointments 6/5/2018  9:30 AM  
ESTABLISHED PATIENT with Gerri Tovar MD  
59[address] [facility] [address]) Appt Note: 3mo fuv  
 [address] [id number] [phone]  
  
   
 [address] [address]  
  
    
 7/25/2018 10:00 AM  
ESTABLISHED PATIENT with Darnell Plascencia MD  
[facility] ([address]) Appt Note: annual  
 [address] 1007 [facility]  
[phone]  
  
   
 354 [facility] Shivam [address] [place] [id number]  
  
    
 7/25/2018 10:00 AM  
PACEMAKER with PACEMAKER, STFRANCES  
[facility] ([facility]) Appt Note: jen sci ICD/stf/study  sherin [address] 600 [facility] [address]  
[phone]  
  
   
 [address] Shivam [address] [place] [phone]  
  
    
 9/27/2018 10:20 AM  
ESTABLISHED PATIENT with Kayla Diaz MD  
[facility] ([address]) Appt Note: 6 mo fu  
 [address] 1007 [facility]  
[address] [facility] Shivam [address] [address] Upcoming Health Maintenance Date Due  
 FOOT EXAM Q1 11/23/1962 EYE EXAM RETINAL OR DILATED Q1 11/23/1962 DTaP/Tdap/Td series (1 - Tdap) 11/23/1973 ZOSTER VACCINE AGE 60> 9/23/2012 GLAUCOMA SCREENING Q2Y 11/23/2017 HEMOGLOBIN A1C Q6M 9/5/2018 Pneumococcal 65+ Low/Medium Risk (2 of 2 - PPSV23) 11/15/2018 MICROALBUMIN Q1 12/4/2018 COLONOSCOPY 2/5/2019 LIPID PANEL Q1 3/5/2019 MEDICARE YEARLY EXAM 3/6/2019 Allergies as of 3/29/2018  Review Complete On: 3/29/2018 By: JENI Bunch Severity Noted Reaction Type Reactions Heparin (Porcine) High 02/07/2017    Other (comments) Was told when in the hospital that if he received heparin again that it would be deadly. Current Immunizations  Reviewed on 12/4/2017 Name Date Influenza Nasal Vaccine 11/15/2017 Pneumococcal Conjugate (PCV-13) 11/15/2017 Not reviewed this visit You Were Diagnosed With   
  
 Codes Comments Systolic CHF, chronic (HCC)    -  Primary ICD-10-CM: P94.98 ICD-9-CM: 428.22, 428.0 Coronary artery disease involving native coronary artery of native heart without angina pectoris     ICD-10-CM: I25.10 ICD-9-CM: 414.01 Paroxysmal atrial fibrillation (HCC)     ICD-10-CM: I48.0 ICD-9-CM: 427.31 Ischemic cardiomyopathy     ICD-10-CM: I25.5 ICD-9-CM: 414.8 SOB (shortness of breath)     ICD-10-CM: R06.02 
ICD-9-CM: 786.05 Vitals BP Pulse Resp Weight(growth percentile) SpO2 BMI  
 120/68 72 18 192 lb (87.1 kg) 98% 27.55 kg/m2 Smoking Status Former Smoker BMI and BSA Data Body Mass Index Body Surface Area  
 27.55 kg/m 2 2.07 m 2 Preferred Pharmacy Pharmacy Name Phone 500 [facility] 52, [address] [place] [phone] Your Updated Medication List  
  
   
This list is accurate as of 3/29/18  3:16 PM.  Always use your most recent med list.  
  
  
  
  
 amiodarone 200 mg tablet Commonly known as:  CORDARONE Take 1 Tab by mouth daily. Indications: PREVENTION OF RECURRENT ATRIAL FIBRILLATION  
  
 aspirin 81 mg chewable tablet Take 81 mg by mouth daily. atorvastatin 10 mg tablet Commonly known as:  LIPITOR TAKE ONE TABLET BY MOUTH ONCE DAILY  
  
 bumetanide 1 mg tablet Commonly known as:  Lenoratatiana Michelle Take 0.5 mg by mouth as needed (fluid retention). carvedilol 3.125 mg tablet Commonly known as:  COREG  
TAKE ONE TABLET BY MOUTH TWICE DAILY WITH MEALS DULoxetine 30 mg capsule Commonly known as:  CYMBALTA Take 1 Cap by mouth daily. Insulin Syringes (Disposable) 1 mL Syrg Pt to take 10 units w/ breakfast and 8 units w/ dinner * NovoLIN 70/30 U-100 Insulin 100 unit/mL (70-30) injection Generic drug:  insulin NPH/insulin regular 10 Units by SubCUTAneous route Daily (before breakfast). * NovoLIN 70/30 U-100 Insulin 100 unit/mL (70-30) injection Generic drug:  insulin NPH/insulin regular  
8 Units by SubCUTAneous route Daily (before dinner). oxyCODONE IR 5 mg immediate release tablet Commonly known as:  Brittany Render Take 1-2 Tabs by mouth every four (4) hours as needed for Pain. Max Daily Amount: 60 mg.  
  
 patiromer calcium sorbitex 8.4 gram powder Commonly known as:  VELTASSA Take 8.4 g by mouth daily. PLAVIX 75 mg Tab Generic drug:  clopidogrel Take  by mouth. sacubitril-valsartan  mg tablet Commonly known as:  ENTRESTO Take 1 Tab by mouth two (2) times a day. spironolactone 25 mg tablet Commonly known as:  ALDACTONE Take 1 Tab by mouth daily. TYLENOL EXTRA STRENGTH 500 mg tablet Generic drug:  acetaminophen Take 1,000 mg by mouth every six (6) hours as needed for Pain. Indications: BACK PAIN  
  
 * Notice: This list has 2 medication(s) that are the same as other medications prescribed for you. Read the directions carefully, and ask your doctor or other care provider to review them with you. We Performed the Following METABOLIC PANEL, BASIC [52194 CPT(R)] NT-PRO BNP [id number] CPT(R)] Follow-up Instructions Return in about 2 months (around 5/29/2018). Patient Instructions 1. Continue current meds 2. Get more exercise, consider cardiac rehab 3. See Dr Zeeshan Palacio to reassess leg circulation or see Dr. Jose Calles ( vascular surgeon) 4. Labs today Continue daily weights (in the morning, after passing your urine). Notify HF team of overnight weight gains > 2 lbs or weekly >5 lbs or if any of the following Sx. Continue to limit sodium intake & monitor your fluid intake (not to exceed 2L per day). Call us for any problems,  questions, or concerns. Introducing \[facility]\"" & HEALTH SERVICES! Rufus Vargas introduces [employer] patient portal. Now you can access parts of your medical record, email your doctor's office, and request medication refills online. 1. In your internet browser, go to https://"[employer]. [employer]/"[employer] 2. Click on the First Time User? Click Here link in the Sign In box. You will see the New Member Sign Up page. 3. Enter your [employer] Access Code exactly as it appears below. You will not need to use this code after youve completed the sign-up process. If you do not sign up before the expiration date, you must request a new code. · [employer] Access Code: [email or web address] Expires: 5/6/2018 11:20 AM 
 
4. Enter the last four digits of your Social Security Number (xxxx) and Date of Birth (mm/dd/yyyy) as indicated and click Submit. You will be taken to the next sign-up page. 5. Create a [employer] ID. This will be your [employer] login ID and cannot be changed, so think of one that is secure and easy to remember. 6. Create a [employer] password. You can change your password at any time. 7. Enter your Password Reset Question and Answer. This can be used at a later time if you forget your password. 8. Enter your e-mail address. You will receive e-mail notification when new information is available in [address] 9. Click Sign Up. You can now view and download portions of your medical record.  
10. Click the Download Summary menu link to download a portable copy of your medical information. If you have questions, please visit the Frequently Asked Questions section of the [employer] website. Remember, [employer] is NOT to be used for urgent needs. For medical emergencies, dial 911. Now available from your iPhone and Android! Please provide this summary of care documentation to your next provider. Your primary care clinician is listed as MICHAEL BALLARD. If you have any questions after today's visit, please call [phone].

## 2018-03-29 NOTE — PATIENT INSTRUCTIONS
1. Continue current meds  2. Get more exercise, consider cardiac rehab  3. See Dr Dayana Neff to reassess leg circulation or see Dr. Wan Middleton ( vascular surgeon)  4. Labs today   Continue daily weights (in the morning, after passing your urine). Notify HF team of overnight weight gains > 2 lbs or weekly >5 lbs or if any of the following Sx. Continue to limit sodium intake & monitor your fluid intake (not to exceed 2L per day). Call us for any problems,  questions, or concerns.

## 2018-03-29 NOTE — TELEPHONE ENCOUNTER
----- Message from Gemma Wu sent at 3/29/2018 12:19 PM EDT -----  Regarding: Dr. Annie Negron  Needs refill of  \"Spironolactone 25mg\" called to [facility] on [address]. Pts number is [phone]. Was prescribed by [facility], but has run out and he was told to go through PCP now.

## 2018-03-29 NOTE — PROGRESS NOTES
[facility] Progress Note      DOS:  3/29/2018     PRIMARY CARE PHYSICIAN: Emre Thakkar MD  CARDIOLOGIST: Dr. Chiquis Graham   EP: Abdirahman Cowart MD       Impression / Plan:  1. Chronic systolic heart failure (EF 25%), d/t ICM, ACC/AHA Stage D, NYHA class II-III off milrinone sine May, activity limited by    Continue coreg, entresto, aldactone   Prn bumex   Labs today    Continue Veltassa   Continue Bumex PRN - has not needed in the past month    Encouraged to be more active    Continue daily weights, sodium and fluid restriction   Follow up in 2 months, sooner for problems    DMV form completed      2. CAD s/p PCI- he has seen Dr. Karla Navarro    Continue ASA, Plavix, Coreg and statin per cardiology.         3. CKD- Cr stable 1.85   monitor      4. PAF-- remains SR   Amiodarone to 200 mg daily- LFT, TSH OK   Amio level per Dr Trini Ambriz to follow    No anticoag 2/2 GI bleed/AVMs         5. PAD   Encouraged to exercise and consider cardiac rehab   Defer to Dr. Karla Navarro or discussed seeing Dr. Aimee Griffith ( vascular surgeon)     6. H/O lower GIB/s/p colorectal surgery       7. Diabetes   Insulin management by PCP    8. XOL- per PCP    HF Education: Continue daily weights (in the morning, after voiding). Notify HF team of overnight weight gains > 2 lbs or weekly >5 lbs or if any of the following Sx. Continue to limit sodium intake & monitor your fluid intake (not to exceed 2L per day). I have discussed the diagnosis with  Marci Sandhu. and the intended plan as seen in the above orders. Questions were answered concerning future plans. I have discussed medication side effects and warnings with the patient as well. Thank you for allowing me to participate in the care of this delightful  gentleman. Please do not hesitate to call with any questions. Opal Carson RN, ACNP-BC, [facility]         Chief Complaint   Patient presents with    Follow Up Chronic Condition     HPI: 74y.o. year old male with a history of chronic systolic heart failure secondary to ICM. His history is also pertinent for CAD s/p PCI of LM,  of RCA, PAF (amiodarone)s/p ICD, Hep C, past GI bleed, and remote tobacco abuse. He presents to clinic today for follow up of his heart failure. He was last seen 2 months ago, at that time his amiodarone was decreased to 200 mg daily. proBNP was 9200. He is feeling well. Since his last visit he has undergone colorectal surgery for polyp/hemmerhoid removal. He has been seen by [facility] for genetic counseling. He has had no colon CA by multiple polyps and significant family hx. He has also seen Dr. Omari Velez as his new cardiologist.    He has been off Milrinone since End of May 2017 and feeling well. He remains active, he notes NAVARRO with significant exertion- if he is rushing and going up an incline. He does not exercise regularly but stays busy with errands, house and yard maintenance. He is not interested in cardiac rehab. His weight has remained stable, mild weight gain over the past several months  2/2 food intake. He has not used bumex for past 2 months. He has chronic claudication that is getting some worse, that limity. The sx resolve with rest.    Denies exertional chest pain, palpitations, orthopnea, PND, syncope, near syncope, no AICD shocks, no bleeding. He is compliant with medications, low NA diet. He limits his K rich food intake. He is sleeping well, appetite good. CARDIAC EVALUATION   ECHO (2/13/17): EF 10% severe GHK, PSM. Dil LA. Mod MR. Mild to mod TR. Left pleural effusion  ECHO (1/19/17): EF 5-10% with severe GHK,. Mildly dil LA. Mild TR. PASP 46. ECHO: (5/15/17) EF 25%, severe GHK, basal-mid inferior AK, severe LAE, Mod MR, Mild TR,      CATH (1/20/17): LM ost70. LAD m50. D1 80. LCx d70; OM1 99, OM2 90. RCA p100. No AVG. PAP 53/27/36. --- s/p PCI (2/9/17): pRCA 2.5x38 Xience + 2.75x12 Xience. ostLM 4.0x12 Xience post-dil with 4.5x12 NC.     ICD (6/12/17, Anup): [employer]    CARDIAC MRI 1/31/17:LVEF 10%, LVH, RV dilated RVEF 25% GHK, marked LAE, mod CB, mild MR, mod-severe TR,  The entire LAD territory : largely viable and should recover upon  revascularization. The entire RCA territory is largely viable and should recover  upon revascularization. The LCx territory demonstrate medium-size infarct with  limited viability. Large left-sided pleural effusion with passive atelectasis of the left lung. RICKY (3/20/17): R: 0.58, L: 0.56      ROS:    General:  negative  HEENT: Constant runny nose   Pulmonary: no cough, shortness of breath, or wheezing  Cardiac: Per HPI   GI:  Negative   Musculo: Arthritis, some neck pain  Neuro: negative  Skin:  negative      History:  Past Medical History:   Diagnosis Date    Arthritis     hands/fingers    CAD (coronary artery disease)     involving native coronary artery of native heart without angina pectoris    Cardiomyopathy ([address] 01/20/2017    A. Echo (1/19/17):  EF 5-10% with severe GHK,. Mildly dil LA. Mild TR. PASP 46./systemic cardiomyopathy    Chronic renal insufficiency 03/06/2017    Diabetes mellitus ([address] 3/6/2017    IDDM    Dyslipidemia 3/6/2017    A. FLP (1/19/17): Tot 120, , HDL 19, LDL 76 (no Rx).  H/O: GI bleed 3/6/2017    Hepatitis C 3/6/2017    Ill-defined condition     hypokalemia    Ill-defined condition     flu 1/2018     Paroxysmal atrial fibrillation ([address] 3/6/2017    Peripheral vascular disease ([address] 9/18/2017    A. RICKY (3/20/17): Right 0.58, Left 0.56. Past Surgical History:   Procedure Laterality Date    COLONOSCOPY N/A 2/17/2017    COLONOSCOPY performed by Karen Almonte. Adolph Escobar MD at [address] COLONOSCOPY N/A 2/5/2018    COLONOSCOPY performed by Karen Almonte.  Adolph Escobar MD at [facility] ENDOSCOPY    HX COLONOSCOPY      [facility] 2/2017    HX GI      colonoscopy x 3 - last 2/2017 - polyp    HX HEART CATHETERIZATION      3 heart stents    HX OTHER SURGICAL  05/2017    AICD    HX PACEMAKER      AICD     Social History     Social History    Marital status:      Spouse name: N/A    Number of children: N/A    Years of education: N/A     Occupational History    Not on file. Social History Main Topics    Smoking status: Former Smoker     Packs/day: 2.00     Years: 45.00     Quit date: 2017    Smokeless tobacco: Never Used    Alcohol use No    Drug use: Yes     Special: Marijuana    Sexual activity: No     Other Topics Concern    Not on file     Social History Narrative     Family History   Problem Relation Age of Onset    Hypertension Mother     Heart Disease Mother      MURMUR    Cancer Father      COLON    Colon Cancer Father     Other Sister      born totally handicapped/epileptic    Kidney Disease Sister       from renal shutdown    Heart Disease Brother     Other Brother      ?pancreatic cancer or ?pancreatitis    Cancer Maternal Uncle      toes and spread    Cancer Paternal Uncle      stomach    Heart Attack Maternal Grandfather 47    Cancer Paternal Grandfather      bone    Cancer Maternal Uncle      stomach    Cancer Maternal Uncle      lung    Anesth Problems Neg Hx        Current Medications:   Current Outpatient Prescriptions   Medication Sig Dispense Refill    spironolactone (ALDACTONE) 25 mg tablet Take 1 Tab by mouth daily. 90 Tab 1    clopidogrel (PLAVIX) 75 mg tab Take  by mouth.  aspirin 81 mg chewable tablet Take 81 mg by mouth daily.  DULoxetine (CYMBALTA) 30 mg capsule Take 1 Cap by mouth daily. (Patient taking differently: Take 30 mg by mouth daily (with dinner). ) 90 Cap 3    amiodarone (CORDARONE) 200 mg tablet Take 1 Tab by mouth daily. Indications: PREVENTION OF RECURRENT ATRIAL FIBRILLATION 30 Tab 6    insulin NPH/insulin regular (NOVOLIN 70/30) 100 unit/mL (70-30) injection 10 Units by SubCUTAneous route Daily (before breakfast).       insulin NPH/insulin regular (NOVOLIN 70/30) 100 unit/mL (70-30) injection 8 Units by SubCUTAneous route Daily (before dinner).  carvedilol (COREG) 3.125 mg tablet TAKE ONE TABLET BY MOUTH TWICE DAILY WITH MEALS 60 Tab 4    atorvastatin (LIPITOR) 10 mg tablet TAKE ONE TABLET BY MOUTH ONCE DAILY (Patient taking differently: Take 10 mg by mouth nightly. TAKE ONE TABLET BY MOUTH ONCE DAILY) 30 Tab 4    sacubitril-valsartan (ENTRESTO) 97 mg/103 mg tablet Take 1 Tab by mouth two (2) times a day. 180 Tab 4    patiromer calcium sorbitex (VELTASSA) 8.4 gram powder Take 8.4 g by mouth daily. 4 Packet 0    bumetanide (BUMEX) 1 mg tablet Take 0.5 mg by mouth as needed (fluid retention).  acetaminophen (TYLENOL EXTRA STRENGTH) 500 mg tablet Take 1,000 mg by mouth every six (6) hours as needed for Pain. Indications: BACK PAIN      Insulin Syringes, Disposable, 1 mL syrg Pt to take 10 units w/ breakfast and 8 units w/ dinner 500 Syringe 1    oxyCODONE IR (ROXICODONE) 5 mg immediate release tablet Take 1-2 Tabs by mouth every four (4) hours as needed for Pain. Max Daily Amount: 60 mg. 40 Tab 0       Allergies: Allergies   Allergen Reactions    Heparin (Porcine) Other (comments)     Was told when in the hospital that if he received heparin again that it would be deadly. Physical Exam:   Physical Exam   Constitutional: A&O x 2, well developed, WM in NAD  -older than his stated age   HENT[de-identified] Normocephalic and atraumatic. mucous membranes pink, moist   Neck: Neck supple. No lymphadenopathy   Cardiovascular: PMI nondisplaced, AICD R infraclavicular space, RRR, S1 & S2, 2/6 systolic murmur at apex, no S3, JVP 10 cm, mild  HJR. Pulmonary/Chest: Effort normal and breath sounds normal. No respiratory distress. Abdominal: Soft. He exhibits no distension. There is no tenderness. Neurological: He is alert and oriented to person, place, and time. Skin: Skin is warm and dry. Psychiatric: He has a normal mood and affect.        Vitals:    Visit Vitals    /68    Pulse 72    Resp 18  Wt 192 lb (87.1 kg)    SpO2 98%    BMI 27.55 kg/m2           Recent Labs:   Lab Results   Component Value Date/Time    WBC 7.8 03/08/2018 02:42 PM    HGB 13.8 03/08/2018 02:42 PM    HCT 42.0 03/08/2018 02:42 PM    PLATELET 815 92/88/8770 02:42 PM    MCV 87.0 03/08/2018 02:42 PM     Lab Results   Component Value Date/Time    TSH 3.040 12/04/2017 10:17 AM      Lab Results   Component Value Date/Time    BNP 1209 (H) 01/21/2017 12:25 PM    NT pro-BNP 6258 (H) 01/31/2017 04:52 AM    NT pro-BNP 6094 (H) 01/30/2017 04:16 AM    NT pro-BNP 6168 (H) 01/29/2017 04:18 AM    NT pro-BNP 6876 (H) 01/28/2017 03:38 AM    NT pro-BNP 5427 (H) 01/27/2017 03:56 AM      Lab Results   Component Value Date/Time    Sodium 140 03/08/2018 02:42 PM    Potassium 4.8 03/08/2018 02:42 PM    Chloride 106 03/08/2018 02:42 PM    CO2 28 03/08/2018 02:42 PM    Anion gap 6 03/08/2018 02:42 PM    Glucose 89 03/08/2018 02:42 PM    BUN 28 (H) 03/08/2018 02:42 PM    Creatinine 1.85 (H) 03/08/2018 02:42 PM    BUN/Creatinine ratio 15 03/08/2018 02:42 PM    GFR est AA 45 (L) 03/08/2018 02:42 PM    GFR est non-AA 37 (L) 03/08/2018 02:42 PM    Calcium 9.1 03/08/2018 02:42 PM    Bilirubin, total 0.4 03/05/2018 10:07 AM    ALT (SGPT) 25 03/05/2018 10:07 AM    AST (SGOT) 24 03/05/2018 10:07 AM    Alk.  phosphatase 59 03/05/2018 10:07 AM    Protein, total 6.9 03/05/2018 10:07 AM    Albumin 4.2 03/05/2018 10:07 AM    Globulin 4.4 (H) 02/22/2017 03:16 AM    A-G Ratio 1.6 03/05/2018 10:07 AM      Lab Results   Component Value Date/Time    Hemoglobin A1c 6.4 (H) 03/05/2018 10:07 AM    Hemoglobin A1c (POC) 6.4 08/17/2017 11:00 AM                  Michelle Ramirez, [facility] [address] [place] [address] [address]  [address] [place] [address]  [phone]  24 hour VAD/HF Pager: [phone]

## 2018-03-30 LAB
BUN SERPL-MCNC: 30 MG/DL (ref 8–27)
BUN/CREAT SERPL: 20 (ref 10–24)
CALCIUM SERPL-MCNC: 8.7 MG/DL (ref 8.6–10.2)
CHLORIDE SERPL-SCNC: 106 MMOL/L (ref 96–106)
CO2 SERPL-SCNC: 20 MMOL/L (ref 18–29)
CREAT SERPL-MCNC: 1.52 MG/DL (ref 0.76–1.27)
GFR SERPLBLD CREATININE-BSD FMLA CKD-EPI: 47 ML/MIN/1.73
GFR SERPLBLD CREATININE-BSD FMLA CKD-EPI: 55 ML/MIN/1.73
GLUCOSE SERPL-MCNC: 67 MG/DL (ref 65–99)
NT-PROBNP SERPL-MCNC: 658 PG/ML (ref 0–376)
POTASSIUM SERPL-SCNC: 4.5 MMOL/L (ref 3.5–5.2)
SODIUM SERPL-SCNC: 144 MMOL/L (ref 134–144)

## 2018-04-02 ENCOUNTER — TELEPHONE (OUTPATIENT)
Dept: CARDIOLOGY CLINIC | Age: 68
End: 2018-04-02

## 2018-04-02 NOTE — TELEPHONE ENCOUNTER
----- Message from JENI Pelayo sent at 4/2/2018 11:51 AM EDT -----  Labs stable  K4.5   continue current plan    I called patient and reviewed lab results, he states understanding, has no questions.

## 2018-04-04 ENCOUNTER — DOCUMENTATION ONLY (OUTPATIENT)
Dept: FAMILY MEDICINE CLINIC | Age: 68
End: 2018-04-04

## 2018-04-04 NOTE — PROGRESS NOTES
[facility] Patient Assistance Program form was received stating they needed additional income information from patient. Patient called and confirmed that this letter was received and they needed additional info. Case number and phone number for Stefany given to patient and he states he will call them. Form scanned in patient's chart.

## 2018-04-13 ENCOUNTER — TELEPHONE (OUTPATIENT)
Dept: CARDIOLOGY CLINIC | Age: 68
End: 2018-04-13

## 2018-04-13 NOTE — TELEPHONE ENCOUNTER
Patient calling to ask if Janell Arenas is delivered to our office-I advised he gets it delivered to his house-he states he will call now.

## 2018-04-17 RX ORDER — ATORVASTATIN CALCIUM 10 MG/1
TABLET, FILM COATED ORAL
Qty: 30 TAB | Refills: 4 | Status: SHIPPED | OUTPATIENT
Start: 2018-04-17 | End: 2018-06-07 | Stop reason: SDUPTHER

## 2018-04-29 NOTE — ROUTINE PROCESS
Bedside shift change report given to Jaxson (oncoming nurse) by Lele Lima RN (offgoing nurse). Report included the following information SBAR, Kardex, Intake/Output, MAR and Recent Results. WDL

## 2018-05-07 ENCOUNTER — DOCUMENTATION ONLY (OUTPATIENT)
Dept: FAMILY MEDICINE CLINIC | Age: 68
End: 2018-05-07

## 2018-05-07 NOTE — PROGRESS NOTES
[facility] Patient assistance program request was put on Dr [employer] to process

## 2018-05-15 ENCOUNTER — TELEPHONE (OUTPATIENT)
Dept: CARDIOLOGY CLINIC | Age: 68
End: 2018-05-15

## 2018-05-16 ENCOUNTER — TELEPHONE (OUTPATIENT)
Dept: CARDIOLOGY CLINIC | Age: 68
End: 2018-05-16

## 2018-05-23 ENCOUNTER — OFFICE VISIT (OUTPATIENT)
Dept: CARDIOLOGY CLINIC | Age: 68
End: 2018-05-23

## 2018-05-23 ENCOUNTER — TELEPHONE (OUTPATIENT)
Dept: CARDIOLOGY CLINIC | Age: 68
End: 2018-05-23

## 2018-05-23 VITALS
BODY MASS INDEX: 28.5 KG/M2 | RESPIRATION RATE: 20 BRPM | OXYGEN SATURATION: 97 % | TEMPERATURE: 98.7 F | WEIGHT: 198.6 LBS | HEART RATE: 76 BPM | DIASTOLIC BLOOD PRESSURE: 80 MMHG | SYSTOLIC BLOOD PRESSURE: 142 MMHG

## 2018-05-23 DIAGNOSIS — Z95.810 ICD (IMPLANTABLE CARDIOVERTER-DEFIBRILLATOR) IN PLACE: ICD-10-CM

## 2018-05-23 DIAGNOSIS — I73.9 PAD (PERIPHERAL ARTERY DISEASE) (HCC): ICD-10-CM

## 2018-05-23 DIAGNOSIS — I48.0 PAF (PAROXYSMAL ATRIAL FIBRILLATION) (HCC): ICD-10-CM

## 2018-05-23 DIAGNOSIS — I50.20 SYSTOLIC CONGESTIVE HEART FAILURE WITH REDUCED LEFT VENTRICULAR FUNCTION, NYHA CLASS 2 (HCC): ICD-10-CM

## 2018-05-23 DIAGNOSIS — I50.20 SYSTOLIC HEART FAILURE, ACC/AHA STAGE C (HCC): ICD-10-CM

## 2018-05-23 DIAGNOSIS — J44.9 CHRONIC OBSTRUCTIVE PULMONARY DISEASE, UNSPECIFIED COPD TYPE (HCC): ICD-10-CM

## 2018-05-23 DIAGNOSIS — I25.5 ISCHEMIC CARDIOMYOPATHY: ICD-10-CM

## 2018-05-23 DIAGNOSIS — N18.9 CHRONIC RENAL IMPAIRMENT, UNSPECIFIED CKD STAGE: ICD-10-CM

## 2018-05-23 DIAGNOSIS — R06.02 SOB (SHORTNESS OF BREATH): ICD-10-CM

## 2018-05-23 DIAGNOSIS — I50.22 SYSTOLIC CHF, CHRONIC (HCC): Primary | ICD-10-CM

## 2018-05-23 DIAGNOSIS — E55.9 VITAMIN D DEFICIENCY: ICD-10-CM

## 2018-05-23 DIAGNOSIS — I44.7 INCOMPLETE LEFT BUNDLE BRANCH BLOCK (LBBB): ICD-10-CM

## 2018-05-23 DIAGNOSIS — E87.5 HYPERKALEMIA: ICD-10-CM

## 2018-05-23 RX ORDER — ALBUTEROL SULFATE 90 UG/1
1 AEROSOL, METERED RESPIRATORY (INHALATION)
Qty: 1 INHALER | Refills: 0 | Status: SHIPPED | OUTPATIENT
Start: 2018-05-23 | End: 2018-09-27 | Stop reason: ALTCHOICE

## 2018-05-23 RX ORDER — CARVEDILOL 3.12 MG/1
TABLET ORAL
Qty: 60 TAB | Refills: 4 | Status: SHIPPED | OUTPATIENT
Start: 2018-05-23 | End: 2018-07-25

## 2018-05-23 RX ORDER — METOPROLOL SUCCINATE 50 MG/1
50 TABLET, EXTENDED RELEASE ORAL DAILY
Qty: 30 TAB | Refills: 3 | Status: SHIPPED | OUTPATIENT
Start: 2018-05-23 | End: 2018-09-11 | Stop reason: SDUPTHER

## 2018-05-23 NOTE — COMMUNICATION BODY
[facility] Progress Note      DOS:  5/23/2018     PRIMARY CARE PHYSICIAN: Miles Zuñiga MD  CARDIOLOGIST: Dr. Shefali Redd   EP: Lynn Kim MD       Impression / Plan:  1. Chronic systolic heart failure (EF 25%), d/t ICM, ACC/AHA Stage D, NYHA class IIb   Continue entresto, aldactone   Use Prn bumex- instructed to take 0.5 mg x 2 days to see if his wheezing improves   Labs today    Continue Veltassa   Echo at [facility] [place]. Iftikhar/CAV   Cardiac rehab if EF still low, otherwise he will qualify under CAD/stent and PAD   Continue daily weights, sodium and fluid restriction   Follow up by phone by Friday   Follow  Up in clinic in 2 months, sooner for problems            2. CAD s/p PCI- he has seen Dr. Flakita Lockwood    Continue ASA, Plavix, BB and statin per cardiology.         3. PAF-- remains SR   Amiodarone to 200 mg daily- LFT, TSH OK   Amio level per Dr Fam Valdes to follow    No anticoag 2/2 GI bleed/AVMs         4. PAD   Encouraged to exercise and consider cardiac rehab   Refer to  Dr. Jeimy Mcgill (vascular surgeon)     5. Suspect KELLY and COPD   Refer to pulmonary for eval of COPD and sleep study   Proventil  Inhaler prn - added     6. Incomplete LBBB  msec      7. CKD- Cr stable 1.52   Monitor    8. H/O lower GIB/s/p colorectal surgery       9. Diabetes recent A1c 6    Insulin management by PCP    10. Paraesthesias    11. XOL- per PCP/cardiology     HF Education: Continue daily weights (in the morning, after voiding). Notify HF team of overnight weight gains > 2 lbs or weekly >5 lbs or if any of the following Sx. Continue to limit sodium intake & monitor your fluid intake (not to exceed 2L per day). I have discussed the diagnosis with  Netta Hamilton and the intended plan as seen in the above orders. Questions were answered concerning future plans. I have discussed medication side effects and warnings with the patient as well.  Thank you for allowing me to participate in the care of this delightful  gentleman. Please do not hesitate to call with any questions. Opal De La Torre RN, ACNP-BC, [facility]         Chief Complaint   Patient presents with    Follow Up Chronic Condition     HPI: 74y.o. year old male with a history of chronic HFrEF (EF 25%) secondary to ICM- CAD s/p PCI of LM,  of RCA s/p AICD complicated by  PAF (amiodarone) , Hep C, past GI bleed, and remote tobacco abuse. He presents to clinic today for follow up of his heart failure. He was last seen 2 months ago, at which time his proBNP had improved to 658. He has been off Milrinone since End of May 2017 and feels  100% better since that time. His most recent echo 5/2017 shows EF 25%   He got a good  report from the colorectal surgeon. He has recovered and is not having any pain. He remains active at home and in the yard. Stable NAVARRO with prolonged walking or stair climbing. He notes some fatigue. He does not exercise regularly. He has not  attended cardiac rehab. His weight has remained stable at home (190 range), and he has not used bumex for past 4 months. He is limiting his sodium, K+,  and sugar, but not as conscious with healthy food choices or portion control. He has chronic claudication that is getting some worse, that limit his activity. He is walking in a different manner trying to not tense his legs, this has improved his distance. The sx resolve with rest.   He sleeps on one pillow and naps most days. He has not been evaluated for KELLY. Wife reports snoring and witnessed apnea. /70 at home. He is compliant with medications. Recent AICD check OK     Denies exertional chest pain, palpitations, orthopnea, PND, syncope, near syncope, no AICD shocks, no bleeding. Fernanda Mclean.  lives with his wife. He is expecting his 6th great grandchild tomorrow. He is retired from industrial pipe welding. He smoked 50 years- quit 1/2017. occasional etoh.  He enjoys old movies, visiting with is 2 adult children and grand children. He enjoys metal [employer] for [employer] relics. Review of Systems: :    General:  Denies fever, chills, fatigue negative  HEENT: Constant runny nose, sinus congestion    Pulmonary: Cough- clear phlegm worse in the morning, some wheezing when he lays down   Cardiac: Per HPI   GI:  Denies N/V, abdominal pain, no bleeding or dark stool    Musculo: Back pain off an on, Arthritis, some neck pain  Neuro: + paraesthesia in feet, Denies TIA or CVA sx, sz, no paraesthesias    Skin:  negative        CARDIAC EVALUATION   ECHO (2/13/17): EF 10% severe GHK, PSM. Dil LA. Mod MR. Mild to mod TR. Left pleural effusion  ECHO (1/19/17): EF 5-10% with severe GHK,. Mildly dil LA. Mild TR. PASP 46. ECHO: (5/15/17) EF 25%, severe GHK, basal-mid inferior AK, severe LAE, Mod MR, Mild TR,      CATH (1/20/17): LM ost70. LAD m50. D1 80. LCx d70; OM1 99, OM2 90. RCA p100. No AVG. PAP 53/27/36. --- s/p PCI (2/9/17): pRCA 2.5x38 Xience + 2.75x12 Xience. ostLM 4.0x12 Xience post-dil with 4.5x12 NC. ICD (6/12/17, Anup): [employer] single lead    CARDIAC MRI 1/31/17:LVEF 10%, LVH, RV dilated RVEF 25% GHK, marked LAE, mod CB, mild MR, mod-severe TR, The entire LAD territory : largely viable and should recover upon  revascularization. The entire RCA territory is largely viable and should recover  upon revascularization. The LCx territory demonstrate medium-size infarct with  limited viability. Large left-sided pleural effusion with passive atelectasis of the left lung. RICKY (3/20/17): R: 0.58, L: 0.56        History:  Past Medical History:   Diagnosis Date    Arthritis     hands/fingers    CAD (coronary artery disease)     involving native coronary artery of native heart without angina pectoris    Cardiomyopathy ([facility] 01/20/2017    A. Echo (1/19/17):  EF 5-10% with severe GHK,. Mildly dil LA. Mild TR.   PASP 46./systemic cardiomyopathy    Chronic renal insufficiency 03/06/2017    Diabetes mellitus ([facility] 3/6/2017    IDDM    Dyslipidemia 3/6/2017    A. FLP (17): Tot 120, , HDL 19, LDL 76 (no Rx).  H/O: GI bleed 3/6/2017    Hepatitis C 3/6/2017    Ill-defined condition     hypokalemia    Ill-defined condition     flu 2018     Paroxysmal atrial fibrillation ([facility] 3/6/2017    [facility] 2017    A. RICKY (3/20/17): Right 0.58, Left 0.56. Past Surgical History:   Procedure Laterality Date    COLONOSCOPY N/A 2017    COLONOSCOPY performed by Cheikh Ngo. Joseline Garduno MD at [address] COLONOSCOPY N/A 2018    COLONOSCOPY performed by Cheikh Ngo. Joseline Garduno MD at [address] HX COLONOSCOPY      [facility] 2017    HX GI      colonoscopy x 3 - last 2017 - polyp    HX HEART CATHETERIZATION      3 heart stents    HX OTHER SURGICAL  2017    AICD    HX PACEMAKER      AICD     Social History     Social History    Marital status:      Spouse name: N/A    Number of children: N/A    Years of education: N/A     Occupational History    Not on file.      Social History Main Topics    Smoking status: Former Smoker     Packs/day: 2.00     Years: 45.00     Quit date: 2017    Smokeless tobacco: Never Used    Alcohol use No    Drug use: Yes     Special: Marijuana    Sexual activity: No     Other Topics Concern    Not on file     Social History Narrative     Family History   Problem Relation Age of Onset    Hypertension Mother     Heart Disease Mother      MURMUR    Cancer Father      COLON    Colon Cancer Father     Other Sister      born totally handicapped/epileptic    Kidney Disease Sister       from renal shutdown    Heart Disease Brother     Other Brother      ?pancreatic cancer or ?pancreatitis    Cancer Maternal Uncle      toes and spread    Cancer Paternal Uncle      stomach    Heart Attack Maternal Grandfather 47    Cancer Paternal Grandfather      bone    Cancer Maternal Uncle      stomach    Cancer Maternal Uncle      lung    Anesth Problems Neg Hx        Current Medications:   Current Outpatient Prescriptions   Medication Sig Dispense Refill    atorvastatin (LIPITOR) 10 mg tablet TAKE ONE TABLET BY MOUTH ONCE DAILY 30 Tab 4    spironolactone (ALDACTONE) 25 mg tablet Take 1 Tab by mouth daily. 90 Tab 1    clopidogrel (PLAVIX) 75 mg tab Take  by mouth.  aspirin 81 mg chewable tablet Take 81 mg by mouth daily.  DULoxetine (CYMBALTA) 30 mg capsule Take 1 Cap by mouth daily. (Patient taking differently: Take 30 mg by mouth daily (with dinner). ) 90 Cap 3    amiodarone (CORDARONE) 200 mg tablet Take 1 Tab by mouth daily. Indications: PREVENTION OF RECURRENT ATRIAL FIBRILLATION 30 Tab 6    insulin NPH/insulin regular (NOVOLIN 70/30) 100 unit/mL (70-30) injection 10 Units by SubCUTAneous route Daily (before breakfast).  insulin NPH/insulin regular (NOVOLIN 70/30) 100 unit/mL (70-30) injection 8 Units by SubCUTAneous route Daily (before dinner).  carvedilol (COREG) 3.125 mg tablet TAKE ONE TABLET BY MOUTH TWICE DAILY WITH MEALS 60 Tab 4    sacubitril-valsartan (ENTRESTO) 97 mg/103 mg tablet Take 1 Tab by mouth two (2) times a day. 180 Tab 4    patiromer calcium sorbitex (VELTASSA) 8.4 gram powder Take 8.4 g by mouth daily. 4 Packet 0    bumetanide (BUMEX) 1 mg tablet Take 0.5 mg by mouth as needed (fluid retention).  acetaminophen (TYLENOL EXTRA STRENGTH) 500 mg tablet Take 1,000 mg by mouth every six (6) hours as needed for Pain. Indications: BACK PAIN      Insulin Syringes, Disposable, 1 mL syrg Pt to take 10 units w/ breakfast and 8 units w/ dinner 500 Syringe 1       Allergies: Allergies   Allergen Reactions    Heparin (Porcine) Other (comments)     Was told when in the hospital that if he received heparin again that it would be deadly.        Physical Exam:     Constitutional: A&O x 2, well developed, WM in NAD  -older than his stated age   HENT[de-identified] Normocephalic and atraumatic. mucous membranes pink, moist   Neck: Neck supple. No lymphadenopathy R carotid bruit   Cardiovascular: PMI nondisplaced, AICD R infraclavicular space, RRR, S1 & S2, 2/6 systolic murmur at apex, no S3, JVP ~10 cm, mild  HJR. Pulmonary/Chest: Effort normal, rhonchi on the left and end insp wheezing noted. .   Abdominal: Soft. Not distended, non-tender, + bowel sounds   Extremities: no edema, no cyanosis, feet warm , + femoral pulses, + bilateral femoral bruits, difficult to palpate distal pedal pulses    Neurological: A&O x 3 nofocal deficits    Skin: Skin is warm and dry. Psychiatric: He has a normal mood and affect.        Vitals:    Visit Vitals    /80    Pulse 76    Temp 98.7 °F (37.1 °C)    Resp 20    Wt 198 lb 9.6 oz (90.1 kg)    SpO2 97%    BMI 28.5 kg/m2           Recent Labs:   Lab Results   Component Value Date/Time    WBC 7.8 03/08/2018 02:42 PM    HGB 13.8 03/08/2018 02:42 PM    HCT 42.0 03/08/2018 02:42 PM    PLATELET 588 24/49/2703 02:42 PM    MCV 87.0 03/08/2018 02:42 PM     Lab Results   Component Value Date/Time    TSH 3.040 12/04/2017 10:17 AM      Lab Results   Component Value Date/Time    BNP 1209 (H) 01/21/2017 12:25 PM    NT pro-BNP 6258 (H) 01/31/2017 04:52 AM    NT pro-BNP 6094 (H) 01/30/2017 04:16 AM    NT pro-BNP 6168 (H) 01/29/2017 04:18 AM    NT pro-BNP 6876 (H) 01/28/2017 03:38 AM    NT pro-BNP 5427 (H) 01/27/2017 03:56 AM      Lab Results   Component Value Date/Time    Sodium 144 03/29/2018 12:00 AM    Potassium 4.5 03/29/2018 12:00 AM    Chloride 106 03/29/2018 12:00 AM    CO2 20 03/29/2018 12:00 AM    Anion gap 6 03/08/2018 02:42 PM    Glucose 67 03/29/2018 12:00 AM    BUN 30 (H) 03/29/2018 12:00 AM    Creatinine 1.52 (H) 03/29/2018 12:00 AM    BUN/Creatinine ratio 20 03/29/2018 12:00 AM    GFR est AA 55 (L) 03/29/2018 12:00 AM    GFR est non-AA 47 (L) 03/29/2018 12:00 AM    Calcium 8.7 03/29/2018 12:00 AM    Bilirubin, total 0.4 03/05/2018 10:07 AM    ALT (SGPT) 25 03/05/2018 10:07 AM    AST (SGOT) 24 03/05/2018 10:07 AM    Alk.  phosphatase 59 03/05/2018 10:07 AM    Protein, total 6.9 03/05/2018 10:07 AM    Albumin 4.2 03/05/2018 10:07 AM    Globulin 4.4 (H) 02/22/2017 03:16 AM    A-G Ratio 1.6 03/05/2018 10:07 AM      Lab Results   Component Value Date/Time    Hemoglobin A1c 6.4 (H) 03/05/2018 10:07 AM    Hemoglobin A1c (POC) 6.4 08/17/2017 11:00 AM              Lynn Haney, [address]  [address] [address]  [address] [place] [address]  [phone]  24 hour VAD/HF Pager: [phone]

## 2018-05-23 NOTE — MR AVS SNAPSHOT
[address] 
[phone] Patient: Prasad Kiser. MRN: [id number] CMK:[id number] Visit Information Date & Time Provider Department Dept. Phone Encounter #  
 5/23/2018 12:30 PM Jen Elias 3. [phone] [id number] Follow-up Instructions Return in about 6 weeks (around 7/4/2018). Your Appointments 6/5/2018  9:30 AM  
ESTABLISHED PATIENT with Karlee Jimenez MD  
[address] [facility]) Appt Note: 3mo fuv  
 [address] [address]  
[phone]  
  
   
 [address] [address]  
  
    
 7/25/2018 10:00 AM  
ESTABLISHED PATIENT with Kiana Diego MD  
[facility] ([facility]) Appt Note: annual  
 [address] [facility]  
[phone]  
  
   
 [address] [place] [address] Street [address]  
  
    
 7/25/2018 10:00 AM  
PACEMAKER with PACEMAKER, STFRANCES  
[facility] ([facility]) Appt Note: jen sci ICD/stf/study  sherin [address] [facility] [address]  
[phone]  
  
   
 [address] [facility] [address] [address]  
  
    
 9/27/2018 10:20 AM  
ESTABLISHED PATIENT with Andrea De León MD  
[facility] ([facility]) Appt Note: 6 mo fu  
 [address] [facility]  
[address] [place] [address] [facility] [address] Upcoming Health Maintenance Date Due  
 FOOT EXAM Q1 11/23/1962 EYE EXAM RETINAL OR DILATED Q1 11/23/1962 DTaP/Tdap/Td series (1 - Tdap) 11/23/1973 ZOSTER VACCINE AGE 60> 9/23/2012 GLAUCOMA SCREENING Q2Y 11/23/2017 Influenza Age 5 to Adult 8/1/2018 HEMOGLOBIN A1C Q6M 9/5/2018 Pneumococcal 65+ Low/Medium Risk (2 of 2 - PPSV23) 11/15/2018 MICROALBUMIN Q1 12/4/2018 COLONOSCOPY 2/5/2019 LIPID PANEL Q1 3/5/2019 MEDICARE YEARLY EXAM 3/6/2019 Allergies as of 5/23/2018  Review Complete On: 5/23/2018 By: JENI Crane Severity Noted Reaction Type Reactions Heparin (Porcine) High 02/07/2017    Other (comments) Was told when in the hospital that if he received heparin again that it would be deadly. Current Immunizations  Reviewed on 12/4/2017 Name Date Influenza Nasal Vaccine 11/15/2017 Pneumococcal Conjugate (PCV-13) 11/15/2017 Not reviewed this visit You Were Diagnosed With   
  
 Codes Comments Systolic CHF, chronic (HCC)    -  Primary ICD-10-CM: P89.40 ICD-9-CM: 428.22, 428.0 Systolic heart failure, ACC/AHA stage C (HCC)     ICD-10-CM: I50.20 ICD-9-CM: 428.20 Systolic congestive heart failure with reduced left ventricular function, NYHA class 2 (HCC)     ICD-10-CM: I50.20 ICD-9-CM: 428.20, 428.0 Ischemic cardiomyopathy     ICD-10-CM: I25.5 ICD-9-CM: 414.8 ICD (implantable cardioverter-defibrillator) in place     ICD-10-CM: Z95.810 ICD-9-CM: V45.02 Hyperkalemia     ICD-10-CM: E87.5 ICD-9-CM: 276.7 Vitamin D deficiency     ICD-10-CM: E55.9 ICD-9-CM: 268.9 Chronic renal impairment, unspecified CKD stage     ICD-10-CM: N18.9 ICD-9-CM: 585.9 SOB (shortness of breath)     ICD-10-CM: R06.02 
ICD-9-CM: 786.05   
 PAF (paroxysmal atrial fibrillation) (HCC)     ICD-10-CM: I48.0 ICD-9-CM: 427.31 Incomplete left bundle branch block (LBBB)     ICD-10-CM: I44.7 ICD-9-CM: 426.2 Chronic obstructive pulmonary disease, unspecified COPD type ([facility]     ICD-10-CM: J44.9 ICD-9-CM: 310 PAD (peripheral artery disease) (HCC)     ICD-10-CM: I73.9 ICD-9-CM: 443. 9 Vitals BP Pulse Temp Resp Weight(growth percentile) SpO2  
 142/80 76 98.7 °F (37.1 °C) 20 198 lb 9.6 oz (90.1 kg) 97% BMI Smoking Status 28.5 kg/m2 Former Smoker Vitals History BMI and BSA Data Body Mass Index Body Surface Area 28.5 kg/m 2 2.11 m 2 Preferred Pharmacy Pharmacy Name [facility] [phone] Your Updated Medication List  
  
   
This list is accurate as of 5/23/18  2:20 PM.  Always use your most recent med list.  
  
  
  
  
 albuterol 90 mcg/actuation inhaler Commonly known as:  PROVENTIL HFA, VENTOLIN HFA, PROAIR HFA Take 1 Puff by inhalation every four (4) hours as needed for Wheezing. Dr Apple Jacobs or Dr. Calin Morrison to refill  
  
 amiodarone 200 mg tablet Commonly known as:  CORDARONE Take 1 Tab by mouth daily. Indications: PREVENTION OF RECURRENT ATRIAL FIBRILLATION  
  
 aspirin 81 mg chewable tablet Take 81 mg by mouth daily. atorvastatin 10 mg tablet Commonly known as:  LIPITOR  
TAKE ONE TABLET BY MOUTH ONCE DAILY  
  
 bumetanide 1 mg tablet Commonly known as:  Rigoberto Engelford Take 0.5 mg by mouth as needed (fluid retention). carvedilol 3.125 mg tablet Commonly known as:  COREG  
TAKE ONE TABLET BY MOUTH TWICE DAILY WITH MEALS DULoxetine 30 mg capsule Commonly known as:  CYMBALTA Take 1 Cap by mouth daily. Insulin Syringes (Disposable) 1 mL Syrg Pt to take 10 units w/ breakfast and 8 units w/ dinner  
  
 metoprolol succinate 50 mg XL tablet Commonly known as:  TOPROL XL Take 1 Tab by mouth daily for 360 days. Indications: chronic heart failure * NovoLIN 70/30 U-100 Insulin 100 unit/mL (70-30) injection Generic drug:  insulin NPH/insulin regular 10 Units by SubCUTAneous route Daily (before breakfast). * NovoLIN 70/30 U-100 Insulin 100 unit/mL (70-30) injection Generic drug:  insulin NPH/insulin regular  
8 Units by SubCUTAneous route Daily (before dinner). patiromer calcium sorbitex 8.4 gram powder Commonly known as:  VELTASSA Take 8.4 g by mouth daily. PLAVIX 75 mg Tab Generic drug:  clopidogrel Take  by mouth. sacubitril-valsartan  mg tablet Commonly known as:  ENTRESTO Take 1 Tab by mouth two (2) times a day. spironolactone 25 mg tablet Commonly known as:  ALDACTONE Take 1 Tab by mouth daily. TYLENOL EXTRA STRENGTH 500 mg tablet Generic drug:  acetaminophen Take 1,000 mg by mouth every six (6) hours as needed for Pain. Indications: BACK PAIN  
  
 * Notice: This list has 2 medication(s) that are the same as other medications prescribed for you. Read the directions carefully, and ask your doctor or other care provider to review them with you. Prescriptions Sent to Pharmacy Refills  
 carvedilol (COREG) 3.125 mg tablet 4 Sig: TAKE ONE TABLET BY MOUTH TWICE DAILY WITH MEALS Class: Normal  
 Pharmacy: [address] [place] [address] Ph #: [phone]  
 metoprolol succinate (TOPROL XL) 50 mg XL tablet 3 Sig: Take 1 Tab by mouth daily for 360 days. Indications: chronic heart failure Class: Normal  
 Pharmacy: [facility] [address] Ph #: [phone] Route: Oral  
 albuterol (PROVENTIL HFA, VENTOLIN HFA, PROAIR HFA) 90 mcg/actuation inhaler 0 Sig: Take 1 Puff by inhalation every four (4) hours as needed for Wheezing. Dr Belinda Hodges or Dr. Favian Christopher to refill Class: Normal  
 Pharmacy: [facility] [address] [address] Ph #: [phone] Route: Inhalation We Performed the Following AMB POC EKG ROUTINE W/ 12 LEADS, INTER & REP [98714 CPT(R)] METABOLIC PANEL, BASIC [63621 CPT(R)] NT-PRO BNP [id number] CPT(R)] REFERRAL TO PULMONARY DISEASE [MGX77 Custom] Comments:  
 COPD, KELLY eval, on amidarone REFERRAL TO VASCULAR SURGERY [UQO545 Custom] Comments:  
 Alka Sauceda ( vascular at [facility]) VITAMIN D, 25-HYDROXY, TOTAL [[id number] Custom] Follow-up Instructions Return in about 6 weeks (around 7/4/2018). To-Do List   
 05/23/2018 ECHO:  2D ECHO COMPLETE ADULT (TTE) W OR WO CONTR Referral Information Referral ID Referred By Referred To  
  
 [id number] BRITTON MENDOZA [facility] [address] [facility] [address] [place], [address] [place] [address] Visits Status Start Date End Date 1 New Request 5/23/18 5/23/19 If your referral has a status of pending review or denied, additional information will be sent to support the outcome of this decision. Referral ID Referred By Referred To  
 [id number] BRITTON MENDOZA Not Available Visits Status Start Date End Date 1 New Request 5/23/18 5/23/19 If your referral has a status of pending review or denied, additional information will be sent to support the outcome of this decision. Patient Instructions   
-You  are doing well overall 
 
- take 1/2 tablet of bumex for 1-2 days to see if your wheezing improves- please call us Friday with an update -sooner if needed  
 
- add proventil inhaler if needed 
 
- STOP coreg and START Toprol XL 50 mg 
 
- See Dr. Katya Marquez (or associate ) for sleep apnea eval and COPD treatment - Labs and EKG today 
 
- see Dr. Suraj Muro (Vascular Surgeon) for your leg pains - Echo to be done at [address] [place] [address] 
 
- continue other meds Introducing [facility]! [facility] introduces [employer] patient portal. Now you can access parts of your medical record, email your doctor's office, and request medication refills online. 1. In your internet browser, go to [email or web address] 2. Click on the First Time User? Click Here link in the Sign In box. You will see the New Member Sign Up page. 3. Enter your [employer] Access Code exactly as it appears below. You will not need to use this code after youve completed the sign-up process.  If you do not sign up before the expiration date, you must request a new code. · [employer] Access Code: [email or web address] Expires: 8/21/2018  2:19 PM 
 
4. Enter the last four digits of your Social Security Number (xxxx) and Date of Birth (mm/dd/yyyy) as indicated and click Submit. You will be taken to the next sign-up page. 5. Create a [employer] ID. This will be your [employer] login ID and cannot be changed, so think of one that is secure and easy to remember. 6. Create a [employer] password. You can change your password at any time. 7. Enter your Password Reset Question and Answer. This can be used at a later time if you forget your password. 8. Enter your e-mail address. You will receive e-mail notification when new information is available in [address] 9. Click Sign Up. You can now view and download portions of your medical record. 10. Click the Download Summary menu link to download a portable copy of your medical information. If you have questions, please visit the Frequently Asked Questions section of the [employer] website. Remember, [employer] is NOT to be used for urgent needs. For medical emergencies, dial 911. Now available from your iPhone and Android! Please provide this summary of care documentation to your next provider. Your primary care clinician is listed as MICHAEL BALLARD. If you have any questions after today's visit, please call [phone].

## 2018-05-23 NOTE — PATIENT INSTRUCTIONS
-You  are doing well overall    - take 1/2 tablet of bumex for 1-2 days to see if your wheezing improves- please call us Friday with an update -sooner if needed     - add proventil inhaler if needed    - STOP coreg and START Toprol XL 50 mg    - See Dr. Nona Rivas (or associate ) for sleep apnea eval and COPD treatment    - Labs and EKG today    - see Dr. Lidia Casiano (Vascular Surgeon) for your leg pains    - Echo to be done at [address] [place] [address]     - continue other meds

## 2018-05-23 NOTE — PROGRESS NOTES
[facility] Progress Note      DOS:  5/23/2018     PRIMARY CARE PHYSICIAN: Edwardo Palm MD  CARDIOLOGIST: Dr. Lenora Matos   EP: Amanda Miranda MD       Impression / Plan:  1. Chronic systolic heart failure (EF 25%), d/t ICM, ACC/AHA Stage D, NYHA class IIb   Continue entresto, aldactone   Use Prn bumex- instructed to take 0.5 mg x 2 days to see if his wheezing improves   Labs today    Continue Veltassa   Echo at [facility]. Iftikhar/CAV   Cardiac rehab if EF still low, otherwise he will qualify under CAD/stent and PAD   Continue daily weights, sodium and fluid restriction   Follow up by phone by Friday   Follow  Up in clinic in 2 months, sooner for problems            2. CAD s/p PCI- he has seen Dr. Sandoval Smith    Continue ASA, Plavix, BB and statin per cardiology.         3. PAF-- remains SR   Amiodarone to 200 mg daily- LFT, TSH OK   Amio level per Dr Martins Said     Dr Martins Said to follow    No anticoag 2/2 GI bleed/AVMs         4. PAD   Encouraged to exercise and consider cardiac rehab   Refer to  Dr. Ranulfo Locke (vascular surgeon)     5. Suspect KELLY and COPD   Refer to pulmonary for eval of COPD and sleep study   Proventil  Inhaler prn - added     6. Incomplete LBBB  msec      7. CKD- Cr stable 1.52   Monitor    8. H/O lower GIB/s/p colorectal surgery       9. Diabetes recent A1c 6    Insulin management by PCP    10. Paraesthesias    11. XOL- per PCP/cardiology     HF Education: Continue daily weights (in the morning, after voiding). Notify HF team of overnight weight gains > 2 lbs or weekly >5 lbs or if any of the following Sx. Continue to limit sodium intake & monitor your fluid intake (not to exceed 2L per day). I have discussed the diagnosis with  Stiven Astorga. and the intended plan as seen in the above orders. Questions were answered concerning future plans. I have discussed medication side effects and warnings with the patient as well.  Thank you for allowing me to participate in the care of this delightful  gentleman. Please do not hesitate to call with any questions. Opal Suarez  RN, ACNP-BC, [facility]         Chief Complaint   Patient presents with    Follow Up Chronic Condition     HPI: 74y.o. year old male with a history of chronic HFrEF (EF 25%) secondary to ICM- CAD s/p PCI of LM,  of RCA s/p AICD complicated by  PAF (amiodarone) , Hep C, past GI bleed, and remote tobacco abuse. He presents to clinic today for follow up of his heart failure. He was last seen 2 months ago, at which time his proBNP had improved to 658. He has been off Milrinone since End of May 2017 and feels  100% better since that time. His most recent echo 5/2017 shows EF 25%   He got a good  report from the colorectal surgeon. He has recovered and is not having any pain. He remains active at home and in the yard. Stable NAVARRO with prolonged walking or stair climbing. He notes some fatigue. He does not exercise regularly. He has not  attended cardiac rehab. His weight has remained stable at home (190 range), and he has not used bumex for past 4 months. He is limiting his sodium, K+,  and sugar, but not as conscious with healthy food choices or portion control. He has chronic claudication that is getting some worse, that limit his activity. He is walking in a different manner trying to not tense his legs, this has improved his distance. The sx resolve with rest.   He sleeps on one pillow and naps most days. He has not been evaluated for KELLY. Wife reports snoring and witnessed apnea. /70 at home. He is compliant with medications. Recent AICD check OK     Denies exertional chest pain, palpitations, orthopnea, PND, syncope, near syncope, no AICD shocks, no bleeding. Fernanda Tan Rai.  lives with his wife. He is expecting his 6th great grandchild tomorrow. He is retired from industrial pipe welding. He smoked 50 years- quit 1/2017. occasional etoh.  He enjoys old movies, visiting with is 2 adult children and grand children. He enjoys metal [employer] for [employer] relics. Review of Systems: :    General:  Denies fever, chills, fatigue negative  HEENT: Constant runny nose, sinus congestion    Pulmonary: Cough- clear phlegm worse in the morning, some wheezing when he lays down   Cardiac: Per HPI   GI:  Denies N/V, abdominal pain, no bleeding or dark stool    Musculo: Back pain off an on, Arthritis, some neck pain  Neuro: + paraesthesia in feet, Denies TIA or CVA sx, sz, no paraesthesias    Skin:  negative        CARDIAC EVALUATION   ECHO (2/13/17): EF 10% severe GHK, PSM. Dil LA. Mod MR. Mild to mod TR. Left pleural effusion  ECHO (1/19/17): EF 5-10% with severe GHK,. Mildly dil LA. Mild TR. PASP 46. ECHO: (5/15/17) EF 25%, severe GHK, basal-mid inferior AK, severe LAE, Mod MR, Mild TR,      CATH (1/20/17): LM ost70. LAD m50. D1 80. LCx d70; OM1 99, OM2 90. RCA p100. No AVG. PAP 53/27/36. --- s/p PCI (2/9/17): pRCA 2.5x38 Xience + 2.75x12 Xience. ostLM 4.0x12 Xience post-dil with 4.5x12 NC. ICD (6/12/17, Anup): [employer] single lead    CARDIAC MRI 1/31/17:LVEF 10%, LVH, RV dilated RVEF 25% GHK, marked LAE, mod CB, mild MR, mod-severe TR, The entire LAD territory : largely viable and should recover upon  revascularization. The entire RCA territory is largely viable and should recover  upon revascularization. The LCx territory demonstrate medium-size infarct with  limited viability. Large left-sided pleural effusion with passive atelectasis of the left lung. RICKY (3/20/17): R: 0.58, L: 0.56        History:  Past Medical History:   Diagnosis Date    Arthritis     hands/fingers    CAD (coronary artery disease)     involving native coronary artery of native heart without angina pectoris    Cardiomyopathy ([facility] [address] 01/20/2017    A. Echo (1/19/17):  EF 5-10% with severe GHK,. Mildly dil LA. Mild TR.   PASP 46./systemic cardiomyopathy    Chronic renal insufficiency 03/06/2017    Diabetes mellitus ([facility] [address] 3/6/2017    IDDM    Dyslipidemia 3/6/2017    A. FLP (17): Tot 120, , HDL 19, LDL 76 (no Rx).  H/O: GI bleed 3/6/2017    Hepatitis C 3/6/2017    Ill-defined condition     hypokalemia    Ill-defined condition     flu 2018     Paroxysmal atrial fibrillation ([facility] [address] 3/6/2017    [facility] 2017    A. RICKY (3/20/17): Right 0.58, Left 0.56. Past Surgical History:   Procedure Laterality Date    COLONOSCOPY N/A 2017    COLONOSCOPY performed by Margie Espana. Thea Huber MD at [address] COLONOSCOPY N/A 2018    COLONOSCOPY performed by Margie Espana. Thea Huber MD at [address] HX COLONOSCOPY      [facility] 2017    HX GI      colonoscopy x 3 - last 2017 - polyp    HX HEART CATHETERIZATION      3 heart stents    HX OTHER SURGICAL  2017    AICD    HX PACEMAKER      AICD     Social History     Social History    Marital status:      Spouse name: N/A    Number of children: N/A    Years of education: N/A     Occupational History    Not on file.      Social History Main Topics    Smoking status: Former Smoker     Packs/day: 2.00     Years: 45.00     Quit date: 2017    Smokeless tobacco: Never Used    Alcohol use No    Drug use: Yes     Special: Marijuana    Sexual activity: No     Other Topics Concern    Not on file     Social History Narrative     Family History   Problem Relation Age of Onset    Hypertension Mother     Heart Disease Mother      MURMUR    Cancer Father      COLON    Colon Cancer Father     Other Sister      born totally handicapped/epileptic    Kidney Disease Sister       from renal shutdown    Heart Disease Brother     Other Brother      ?pancreatic cancer or ?pancreatitis    Cancer Maternal Uncle      toes and spread    Cancer Paternal Uncle      stomach    Heart Attack Maternal Grandfather 47    Cancer Paternal Grandfather      bone    Cancer Maternal Uncle      stomach    Cancer Maternal Uncle      lung    Anesth Problems Neg Hx        Current Medications:   Current Outpatient Prescriptions   Medication Sig Dispense Refill    atorvastatin (LIPITOR) 10 mg tablet TAKE ONE TABLET BY MOUTH ONCE DAILY 30 Tab 4    spironolactone (ALDACTONE) 25 mg tablet Take 1 Tab by mouth daily. 90 Tab 1    clopidogrel (PLAVIX) 75 mg tab Take  by mouth.  aspirin 81 mg chewable tablet Take 81 mg by mouth daily.  DULoxetine (CYMBALTA) 30 mg capsule Take 1 Cap by mouth daily. (Patient taking differently: Take 30 mg by mouth daily (with dinner). ) 90 Cap 3    amiodarone (CORDARONE) 200 mg tablet Take 1 Tab by mouth daily. Indications: PREVENTION OF RECURRENT ATRIAL FIBRILLATION 30 Tab 6    insulin NPH/insulin regular (NOVOLIN 70/30) 100 unit/mL (70-30) injection 10 Units by SubCUTAneous route Daily (before breakfast).  insulin NPH/insulin regular (NOVOLIN 70/30) 100 unit/mL (70-30) injection 8 Units by SubCUTAneous route Daily (before dinner).  carvedilol (COREG) 3.125 mg tablet TAKE ONE TABLET BY MOUTH TWICE DAILY WITH MEALS 60 Tab 4    sacubitril-valsartan (ENTRESTO) 97 mg/103 mg tablet Take 1 Tab by mouth two (2) times a day. 180 Tab 4    patiromer calcium sorbitex (VELTASSA) 8.4 gram powder Take 8.4 g by mouth daily. 4 Packet 0    bumetanide (BUMEX) 1 mg tablet Take 0.5 mg by mouth as needed (fluid retention).  acetaminophen (TYLENOL EXTRA STRENGTH) 500 mg tablet Take 1,000 mg by mouth every six (6) hours as needed for Pain. Indications: BACK PAIN      Insulin Syringes, Disposable, 1 mL syrg Pt to take 10 units w/ breakfast and 8 units w/ dinner 500 Syringe 1       Allergies: Allergies   Allergen Reactions    Heparin (Porcine) Other (comments)     Was told when in the hospital that if he received heparin again that it would be deadly.        Physical Exam:     Constitutional: A&O x 2, well developed, WM in NAD  -older than his stated age   HENT[de-identified] Normocephalic and atraumatic. mucous membranes pink, moist   Neck: Neck supple. No lymphadenopathy R carotid bruit   Cardiovascular: PMI nondisplaced, AICD R infraclavicular space, RRR, S1 & S2, 2/6 systolic murmur at apex, no S3, JVP ~10 cm, mild  HJR. Pulmonary/Chest: Effort normal, rhonchi on the left and end insp wheezing noted. .   Abdominal: Soft. Not distended, non-tender, + bowel sounds   Extremities: no edema, no cyanosis, feet warm , + femoral pulses, + bilateral femoral bruits, difficult to palpate distal pedal pulses    Neurological: A&O x 3 nofocal deficits    Skin: Skin is warm and dry. Psychiatric: He has a normal mood and affect.        Vitals:    Visit Vitals    /80    Pulse 76    Temp 98.7 °F (37.1 °C)    Resp 20    Wt 198 lb 9.6 oz (90.1 kg)    SpO2 97%    BMI 28.5 kg/m2           Recent Labs:   Lab Results   Component Value Date/Time    WBC 7.8 03/08/2018 02:42 PM    HGB 13.8 03/08/2018 02:42 PM    HCT 42.0 03/08/2018 02:42 PM    PLATELET 990 83/05/7092 02:42 PM    MCV 87.0 03/08/2018 02:42 PM     Lab Results   Component Value Date/Time    TSH 3.040 12/04/2017 10:17 AM      Lab Results   Component Value Date/Time    BNP 1209 (H) 01/21/2017 12:25 PM    NT pro-BNP 6258 (H) 01/31/2017 04:52 AM    NT pro-BNP 6094 (H) 01/30/2017 04:16 AM    NT pro-BNP 6168 (H) 01/29/2017 04:18 AM    NT pro-BNP 6876 (H) 01/28/2017 03:38 AM    NT pro-BNP 5427 (H) 01/27/2017 03:56 AM      Lab Results   Component Value Date/Time    Sodium 144 03/29/2018 12:00 AM    Potassium 4.5 03/29/2018 12:00 AM    Chloride 106 03/29/2018 12:00 AM    CO2 20 03/29/2018 12:00 AM    Anion gap 6 03/08/2018 02:42 PM    Glucose 67 03/29/2018 12:00 AM    BUN 30 (H) 03/29/2018 12:00 AM    Creatinine 1.52 (H) 03/29/2018 12:00 AM    BUN/Creatinine ratio 20 03/29/2018 12:00 AM    GFR est AA 55 (L) 03/29/2018 12:00 AM    GFR est non-AA 47 (L) 03/29/2018 12:00 AM    Calcium 8.7 03/29/2018 12:00 AM    Bilirubin, total 0.4 03/05/2018 10:07 AM    ALT (SGPT) 25 03/05/2018 10:07 AM    AST (SGOT) 24 03/05/2018 10:07 AM    Alk.  phosphatase 59 03/05/2018 10:07 AM    Protein, total 6.9 03/05/2018 10:07 AM    Albumin 4.2 03/05/2018 10:07 AM    Globulin 4.4 (H) 02/22/2017 03:16 AM    A-G Ratio 1.6 03/05/2018 10:07 AM      Lab Results   Component Value Date/Time    Hemoglobin A1c 6.4 (H) 03/05/2018 10:07 AM    Hemoglobin A1c (POC) 6.4 08/17/2017 11:00 AM              Alexandra Rdz, [address]  [facility]  [phone]  24 hour VAD/HF Pager: [phone]

## 2018-05-23 NOTE — LETTER
5/23/2018 5:33 PM 
 
Patient:  Tania Gilman. YOB: 1950 Date of Visit: 5/23/2018 Dear Dennis Mendoza MD 
[address] [facility] [address] [address] VIA Facsimile: [phone] Camden Ayala MD 
[address] [address] VIA In Basket Devonte Keen MD 
[address] [facility] [address] [address] VIA In Basket Margot Foy MD 
[address] [address] VIA In Basket Suma Almeida MD 
[address] [facility] [address] [facility] [address] VIA Facsimile: [phone] Adali Anastasiia, [address] [facility] [address] [facility] [address] VIA Facsimile: [phone] 
 : Thank you for referring Mr. Chuy Crane to me for evaluation/treatment. Below are the relevant portions of my assessment and plan of care. [facility] Progress Note DOS:  5/23/2018 PRIMARY CARE PHYSICIAN: Camden Ayala MD 
CARDIOLOGIST: Dr. Margot Foy  
EP: Ellen Garg MD  
 
 
Impression / Plan: 1. Chronic systolic heart failure (EF 25%), d/t ICM, ACC/AHA Stage D, NYHA class IIb Continue entresto, aldactone Use Prn bumex- instructed to take 0.5 mg x 2 days to see if his wheezing improves Labs today Continue [facility] at [address] [place] [address] Cardiac rehab if EF still low, otherwise he will qualify under CAD/stent and PAD Continue daily weights, sodium and fluid restriction Follow up by phone by Friday Follow  Up in clinic in 2 months, sooner for problems  
    
   
2. CAD s/p PCI- he has seen Dr. Vani Avila  
 Continue ASA, Plavix, BB and statin per cardiology.    
   
3. PAF-- remains SR Amiodarone to 200 mg daily- LFT, TSH OK Amio level per Dr Alecia Alford to follow No anticoag 2/2 GI bleed/AVMs 
  
   
4. PAD Encouraged to exercise and consider cardiac rehab Refer to  Dr. Ana Cristina Saleh (vascular surgeon) 5.  Suspect KELLY and COPD 
 Refer to pulmonary for eval of COPD and sleep study Proventil  Inhaler prn - added 6. Incomplete LBBB  msec 7. CKD- Cr stable 1.52 Monitor 8. H/O lower GIB/s/p colorectal surgery 9. Diabetes recent A1c 6 Insulin management by PCP 10. Paraesthesias 11. XOL- per PCP/cardiology HF Education: Continue daily weights (in the morning, after voiding). Notify HF team of overnight weight gains > 2 lbs or weekly >5 lbs or if any of the following Sx. Continue to limit sodium intake & monitor your fluid intake (not to exceed 2L per day). I have discussed the diagnosis with  Sky Quesada. and the intended plan as seen in the above orders. Questions were answered concerning future plans. I have discussed medication side effects and warnings with the patient as well. Thank you for allowing me to participate in the care of this delightful  gentleman. Please do not hesitate to call with any questions. Opal Villanueva RN, ACNP-BC, [facility] Chief Complaint Patient presents with  Follow Up Chronic Condition HPI: 74y.o. year old male with a history of chronic HFrEF (EF 25%) secondary to ICM- CAD s/p PCI of LM,  of RCA s/p AICD complicated by  PAF (amiodarone) , Hep C, past GI bleed, and remote tobacco abuse. He presents to clinic today for follow up of his heart failure. He was last seen 2 months ago, at which time his proBNP had improved to 658. He has been off Milrinone since End of May 2017 and feels  100% better since that time. His most recent echo 5/2017 shows EF 25% He got a good  report from the colorectal surgeon. He has recovered and is not having any pain. He remains active at home and in the yard. Stable NAVARRO with prolonged walking or stair climbing. He notes some fatigue. He does not exercise regularly. He has not  attended cardiac rehab.   
His weight has remained stable at home (190 range), and he has not used bumex for past 4 months. He is limiting his sodium, K+,  and sugar, but not as conscious with healthy food choices or portion control. He has chronic claudication that is getting some worse, that limit his activity. He is walking in a different manner trying to not tense his legs, this has improved his distance. The sx resolve with rest. 
 He sleeps on one pillow and naps most days. He has not been evaluated for KELLY. Wife reports snoring and witnessed apnea. /70 at home. He is compliant with medications. Recent AICD check OK Denies exertional chest pain, palpitations, orthopnea, PND, syncope, near syncope, no AICD shocks, no bleeding. Fernanda Walton.  lives with his wife. He is expecting his 6th great grandchild tomorrow. He is retired from industrial pipe welding. He smoked 50 years- quit 1/2017. occasional etoh. He enjoys old movies, visiting with is 2 adult children and grand children. He enjoys metal [employer] for [employer] relics. Review of Systems: :   
General:  Denies fever, chills, fatigue negative HEENT: Constant runny nose, sinus congestion Pulmonary: Cough- clear phlegm worse in the morning, some wheezing when he lays down Cardiac: Per HPI  
GI:  Denies N/V, abdominal pain, no bleeding or dark stool Musculo: Back pain off an on, Arthritis, some neck pain Neuro: + paraesthesia in feet, Denies TIA or CVA sx, sz, no paraesthesias Skin:  negative CARDIAC EVALUATION  
ECHO (2/13/17): EF 10% severe GHK, PSM. Dil LA. Mod MR. Mild to mod TR. Left pleural effusion ECHO (1/19/17): EF 5-10% with severe GHK,. Mildly dil LA. Mild TR. PASP 46. ECHO: (5/15/17) EF 25%, severe GHK, basal-mid inferior AK, severe LAE, Mod MR, Mild TR, CATH (1/20/17): LM ost70. LAD m50. D1 80. LCx d70; OM1 99, OM2 90. RCA p100. No AVG. PAP 53/27/36. --- s/p PCI (2/9/17): pRCA 2.5x38 Xience + 2.75x12 Xience. ostLM 4.0x12 Xience post-dil with 4.5x12 NC. ICD (6/12/17, Anup): [employer] single lead CARDIAC MRI 1/31/17:LVEF 10%, LVH, RV dilated RVEF 25% GHK, marked LAE, mod CB, mild MR, mod-severe TR, The entire LAD territory : largely viable and should recover upon 
revascularization. The entire RCA territory is largely viable and should recover 
upon revascularization. The LCx territory demonstrate medium-size infarct with 
limited viability. Large left-sided pleural effusion with passive atelectasis of the left lung. RICKY (3/20/17): R: 0.58, L: 0.56 History: 
Past Medical History:  
Diagnosis Date  Arthritis   
 hands/fingers  CAD (coronary artery disease)   
 involving native coronary artery of native heart without angina pectoris  Cardiomyopathy ([facility] [address] 01/20/2017 A. Echo (1/19/17):  EF 5-10% with severe GHK,. Mildly dil LA. Mild TR. PASP 46./systemic cardiomyopathy  Chronic renal insufficiency 03/06/2017  Diabetes mellitus ([facility] [address] 3/6/2017 IDDM  Dyslipidemia 3/6/2017 A. FLP (1/19/17): Tot 120, , HDL 19, LDL 76 (no Rx).  H/O: GI bleed 3/6/2017  Hepatitis C 3/6/2017  Ill-defined condition   
 hypokalemia  Ill-defined condition   
 flu 1/2018  Paroxysmal atrial fibrillation ([facility] [address] 3/6/2017  Peripheral vascular disease ([facility] [address] 9/18/2017 A. RICKY (3/20/17): Right 0.58, Left 0.56. Past Surgical History:  
Procedure Laterality Date  COLONOSCOPY N/A 2/17/2017 COLONOSCOPY performed by Lord Garcia. Joseph Jackson MD at [address] [place] [address] ENDOSCOPY  COLONOSCOPY N/A 2/5/2018 COLONOSCOPY performed by Lord Garcia. Joseph Jackson MD at [address] [place] [address] ENDOSCOPY  
 HX COLONOSCOPY [address] [place] [address] 2/2017  HX GI    
 colonoscopy x 3 - last 2/2017 - polyp  HX HEART CATHETERIZATION    
 3 heart stents  HX OTHER SURGICAL  05/2017 AICD  HX PACEMAKER    
 AICD Social History Social History  Marital status:  Spouse name: N/A  
 Number of children: N/A  
 Years of education: N/A Occupational History  Not on file. Social History Main Topics  Smoking status: Former Smoker Packs/day: 2.00 Years: 45.00 Quit date: 2017  Smokeless tobacco: Never Used  Alcohol use No  
 Drug use: Yes Special: Marijuana  Sexual activity: No  
 
Other Topics Concern  Not on file Social History Narrative Family History Problem Relation Age of Onset  Hypertension Mother  Heart Disease Mother MURMUR  
 Cancer Father COLON  
 Colon Cancer Father  Other Sister   
  born totally handicapped/epileptic  Kidney Disease Sister   
   from renal shutdown  Heart Disease Brother  Other Brother ?pancreatic cancer or ?pancreatitis  Cancer Maternal Uncle   
  toes and spread  Cancer Paternal Uncle   
  stomach  
 Heart Attack Maternal Grandfather 47  Cancer Paternal Grandfather   
  bone  Cancer Maternal Uncle   
  stomach  Cancer Maternal Uncle   
  lung  Anesth Problems Neg Hx Current Medications:  
Current Outpatient Prescriptions Medication Sig Dispense Refill  atorvastatin (LIPITOR) 10 mg tablet TAKE ONE TABLET BY MOUTH ONCE DAILY 30 Tab 4  
 spironolactone (ALDACTONE) 25 mg tablet Take 1 Tab by mouth daily. 90 Tab 1  clopidogrel (PLAVIX) 75 mg tab Take  by mouth.  aspirin 81 mg chewable tablet Take 81 mg by mouth daily.  DULoxetine (CYMBALTA) 30 mg capsule Take 1 Cap by mouth daily. (Patient taking differently: Take 30 mg by mouth daily (with dinner). ) 90 Cap 3  
 amiodarone (CORDARONE) 200 mg tablet Take 1 Tab by mouth daily. Indications: PREVENTION OF RECURRENT ATRIAL FIBRILLATION 30 Tab 6  
 insulin NPH/insulin regular (NOVOLIN 70/30) 100 unit/mL (70-30) injection 10 Units by SubCUTAneous route Daily (before breakfast).  insulin NPH/insulin regular (NOVOLIN 70/30) 100 unit/mL (70-30) injection 8 Units by SubCUTAneous route Daily (before dinner).  carvedilol (COREG) 3.125 mg tablet TAKE ONE TABLET BY MOUTH TWICE DAILY WITH MEALS 60 Tab 4  
 sacubitril-valsartan (ENTRESTO) 97 mg/103 mg tablet Take 1 Tab by mouth two (2) times a day. 180 Tab 4  patiromer calcium sorbitex (VELTASSA) 8.4 gram powder Take 8.4 g by mouth daily. 4 Packet 0  
 bumetanide (BUMEX) 1 mg tablet Take 0.5 mg by mouth as needed (fluid retention).  acetaminophen (TYLENOL EXTRA STRENGTH) 500 mg tablet Take 1,000 mg by mouth every six (6) hours as needed for Pain. Indications: BACK PAIN    
 Insulin Syringes, Disposable, 1 mL syrg Pt to take 10 units w/ breakfast and 8 units w/ dinner 500 Syringe 1 Allergies: Allergies Allergen Reactions  Heparin (Porcine) Other (comments) Was told when in the hospital that if he received heparin again that it would be deadly. Physical Exam:  
 
Constitutional: A&O x 2, well developed, WM in NAD  -older than his stated age HENT[de-identified] Normocephalic and atraumatic. mucous membranes pink, moist  
Neck: Neck supple. No lymphadenopathy R carotid bruit Cardiovascular: PMI nondisplaced, AICD R infraclavicular space, RRR, S1 & S2, 2/6 systolic murmur at apex, no S3, JVP ~10 cm, mild  HJR. Pulmonary/Chest: Effort normal, rhonchi on the left and end insp wheezing noted. .  
Abdominal: Soft. Not distended, non-tender, + bowel sounds Extremities: no edema, no cyanosis, feet warm , + femoral pulses, + bilateral femoral bruits, difficult to palpate distal pedal pulses Neurological: A&O x 3 nofocal deficits Skin: Skin is warm and dry. Psychiatric: He has a normal mood and affect. Vitals:   
Visit Vitals  /80  Pulse 76  Temp 98.7 °F (37.1 °C)  Resp 20  Wt 198 lb 9.6 oz (90.1 kg)  SpO2 97%  BMI 28.5 kg/m2 Recent Labs:  
Lab Results Component Value Date/Time  WBC 7.8 03/08/2018 02:42 PM  
 HGB 13.8 03/08/2018 02:42 PM  
 HCT 42.0 03/08/2018 02:42 PM  
 PLATELET 308 20/86/1959 02:42 PM  
 MCV 87.0 03/08/2018 02:42 PM  
 
Lab Results Component Value Date/Time TSH 3.040 12/04/2017 10:17 AM  
  
Lab Results Component Value Date/Time BNP 1209 (H) 01/21/2017 12:25 PM  
 NT pro-BNP 6258 (H) 01/31/2017 04:52 AM  
 NT pro-BNP 6094 (H) 01/30/2017 04:16 AM  
 NT pro-BNP 6168 (H) 01/29/2017 04:18 AM  
 NT pro-BNP 6876 (H) 01/28/2017 03:38 AM  
 NT pro-BNP 5427 (H) 01/27/2017 03:56 AM  
  
Lab Results Component Value Date/Time Sodium 144 03/29/2018 12:00 AM  
 Potassium 4.5 03/29/2018 12:00 AM  
 Chloride 106 03/29/2018 12:00 AM  
 CO2 20 03/29/2018 12:00 AM  
 Anion gap 6 03/08/2018 02:42 PM  
 Glucose 67 03/29/2018 12:00 AM  
 BUN 30 (H) 03/29/2018 12:00 AM  
 Creatinine 1.52 (H) 03/29/2018 12:00 AM  
 BUN/Creatinine ratio 20 03/29/2018 12:00 AM  
 GFR est AA 55 (L) 03/29/2018 12:00 AM  
 GFR est non-AA 47 (L) 03/29/2018 12:00 AM  
 Calcium 8.7 03/29/2018 12:00 AM  
 Bilirubin, total 0.4 03/05/2018 10:07 AM  
 ALT (SGPT) 25 03/05/2018 10:07 AM  
 AST (SGOT) 24 03/05/2018 10:07 AM  
 Alk. phosphatase 59 03/05/2018 10:07 AM  
 Protein, total 6.9 03/05/2018 10:07 AM  
 Albumin 4.2 03/05/2018 10:07 AM  
 Globulin 4.4 (H) 02/22/2017 03:16 AM  
 A-G Ratio 1.6 03/05/2018 10:07 AM  
  
Lab Results Component Value Date/Time Hemoglobin A1c 6.4 (H) 03/05/2018 10:07 AM  
 Hemoglobin A1c (POC) 6.4 08/17/2017 11:00 AM  
  
 
 
 
 
Vidal Angle, ACNP Jennifer Montebello Roman [address] [facility] [address] [address] 
[phone] 
07 hour VAD/HF Pager: [phone] If you have questions, please do not hesitate to call me. I look forward to following Mr. Eyad Castillo along with you. Sincerely, JENI Kearns

## 2018-05-23 NOTE — TELEPHONE ENCOUNTER
Telephone Call RE:  Appointment reminder     Outcome:     [x] Patient confirmed appointment   [] Patient rescheduled appointment for    [] Unable to reach   [] Left message              [] Other:     Patient confirmed appointment. Rescheduled him to today.       Lloyd Garcia

## 2018-05-24 ENCOUNTER — TELEPHONE (OUTPATIENT)
Dept: CARDIOLOGY CLINIC | Age: 68
End: 2018-05-24

## 2018-05-24 NOTE — TELEPHONE ENCOUNTER
Calvin Robles I scheduled patient to see Dr. Grzegorz Mendez for COPD and sleep evaluation    6-26-18  10:00am arrival for 10:30am appointment     [address] [place] [address]  [phone]    Patient will need a CXR prior to appointment. An packet with his order is being mailed from [facility]. He understands.     Records were faxed to [phone]

## 2018-05-25 ENCOUNTER — TELEPHONE (OUTPATIENT)
Dept: CARDIOLOGY CLINIC | Age: 68
End: 2018-05-25

## 2018-05-25 NOTE — TELEPHONE ENCOUNTER
Per MONICA Kim I scheduled patient an appointment with   Dr. Simi Rosenberg  6-13-18    2:00PM    Jennifer Montano  [address]  [place], [address] [place]    I left this information on patient's voice mail.

## 2018-05-31 LAB
25(OH)D3 SERPL-MCNC: 12 NG/ML
BUN SERPL-MCNC: 24 MG/DL (ref 8–27)
BUN/CREAT SERPL: 16 (ref 10–24)
CALCIUM SERPL-MCNC: 9.1 MG/DL (ref 8.6–10.2)
CHLORIDE SERPL-SCNC: 105 MMOL/L (ref 96–106)
CO2 SERPL-SCNC: 21 MMOL/L (ref 18–29)
CREAT SERPL-MCNC: 1.53 MG/DL (ref 0.76–1.27)
GFR SERPLBLD CREATININE-BSD FMLA CKD-EPI: 47 ML/MIN/1.73
GFR SERPLBLD CREATININE-BSD FMLA CKD-EPI: 54 ML/MIN/1.73
GLUCOSE SERPL-MCNC: 83 MG/DL (ref 65–99)
NT-PROBNP SERPL-MCNC: 1705 PG/ML (ref 0–376)
POTASSIUM SERPL-SCNC: 4 MMOL/L (ref 3.5–5.2)
SODIUM SERPL-SCNC: 142 MMOL/L (ref 134–144)

## 2018-06-01 ENCOUNTER — TELEPHONE (OUTPATIENT)
Dept: CARDIOLOGY CLINIC | Age: 68
End: 2018-06-01

## 2018-06-01 DIAGNOSIS — I50.22 SYSTOLIC CHF, CHRONIC (HCC): Primary | ICD-10-CM

## 2018-06-01 NOTE — TELEPHONE ENCOUNTER
----- Message from JENI Alvarado sent at 5/23/2018  2:16 PM EDT -----  Please arrange  Pulm eval - Dr. April walters for COPD and KELLY eval  Vascular surgery eval - Dr. Estrada Artist - PAD  Echo at [facility]- if they will not do pre cert- let me know     Cardiac rehab for either CHF, CAD//stent. Or PAD- surely one of them will be covered:)  Thank you       I called patient and he has declined cardiac rehab at this time. Patient called back, would like to try cardiac rehab-requested I send it to [facility] faxed request and office notes to [address].

## 2018-06-04 PROBLEM — E55.9 VITAMIN D DEFICIENCY: Status: ACTIVE | Noted: 2018-06-04

## 2018-06-04 RX ORDER — MELATONIN
2000 DAILY
COMMUNITY
End: 2019-04-29

## 2018-06-04 NOTE — PROGRESS NOTES
BMP stable  proBNP up as he appeared a little wet  Vit D low, please add 2000 units daily    Thank you

## 2018-06-05 ENCOUNTER — TELEPHONE (OUTPATIENT)
Dept: CARDIOLOGY CLINIC | Age: 68
End: 2018-06-05

## 2018-06-05 NOTE — TELEPHONE ENCOUNTER
----- Message from JENI Mccain sent at 6/4/2018  5:03 PM EDT -----  BMP stable  proBNP up as he appeared a little wet  Vit D low, please add 2000 units daily    Thank you    I called patient and reviewed lab results, he states understanding and has no questions.

## 2018-06-06 ENCOUNTER — HOSPITAL ENCOUNTER (OUTPATIENT)
Dept: LAB | Age: 68
Discharge: HOME | End: 2018-06-06
Payer: MEDICARE

## 2018-06-06 ENCOUNTER — OFFICE VISIT (OUTPATIENT)
Dept: FAMILY MEDICINE CLINIC | Age: 68
End: 2018-06-06

## 2018-06-06 VITALS
BODY MASS INDEX: 28.2 KG/M2 | WEIGHT: 197 LBS | DIASTOLIC BLOOD PRESSURE: 67 MMHG | RESPIRATION RATE: 20 BRPM | OXYGEN SATURATION: 93 % | HEIGHT: 70 IN | SYSTOLIC BLOOD PRESSURE: 120 MMHG | TEMPERATURE: 97.5 F | HEART RATE: 66 BPM

## 2018-06-06 DIAGNOSIS — R80.9 PROTEINURIA, UNSPECIFIED TYPE: ICD-10-CM

## 2018-06-06 DIAGNOSIS — I10 HYPERTENSION, UNSPECIFIED TYPE: ICD-10-CM

## 2018-06-06 DIAGNOSIS — E11.21 TYPE 2 DIABETES MELLITUS WITH NEPHROPATHY (HCC): Primary | ICD-10-CM

## 2018-06-06 DIAGNOSIS — E78.5 DYSLIPIDEMIA: ICD-10-CM

## 2018-06-06 PROCEDURE — 80053 COMPREHEN METABOLIC PANEL: CPT

## 2018-06-06 PROCEDURE — 83036 HEMOGLOBIN GLYCOSYLATED A1C: CPT

## 2018-06-06 PROCEDURE — 80061 LIPID PANEL: CPT

## 2018-06-06 NOTE — PROGRESS NOTES
Patient here for 3 month f/u, fasting labs. 1. Have you been to the ER, urgent care clinic since your last visit? Hospitalized since your last visit? No    2. Have you seen or consulted any other health care providers outside of the [facility] since your last visit? Include any pap smears or colon screening. No     Tania Gilman.  6/6/2018  Provider:   Danny:  Diabetes Report Card   1) Have you seen the eye doctor in past year?no    2) How would you  rate your Diabetic Diet?ok   3) How well do you take care of your feet?well   4) Do you keep your Primary Care Follow Up Appts? yes    5) Do you know your A1C goal?yes    6) Do you take your medications daily? yes    7) Do you check your blood sugars? yes    8) Have you gained weight?yes       9) Do you follow an exercise program?yes    10) Can you do better?yes      Lab Results   Component Value Date/Time    Cholesterol, total 188 03/05/2018 10:07 AM    HDL Cholesterol 39 (L) 03/05/2018 10:07 AM    LDL, calculated 122 (H) 03/05/2018 10:07 AM    Triglyceride 137 03/05/2018 10:07 AM    CHOL/HDL Ratio 6.3 (H) 01/19/2017 03:50 AM     Lab Results   Component Value Date/Time    Hemoglobin A1c 6.4 (H) 03/05/2018 10:07 AM    Hemoglobin A1c 6.2 (H) 12/04/2017 10:17 AM    Hemoglobin A1c 13.5 (H) 01/19/2017 05:05 PM    Glucose 83 05/23/2018 12:00 AM    Glucose (POC) 146 (H) 03/13/2018 12:11 PM    Microalb/Creat ratio (ug/mg creat.) 106.5 (H) 12/04/2017 10:17 AM    LDL, calculated 122 (H) 03/05/2018 10:07 AM    Creatinine 1.53 (H) 05/23/2018 12:00 AM        Lab Results   Component Value Date/Time    Microalb/Creat ratio (ug/mg creat.) 106.5 (H) 12/04/2017 10:17 AM      Chief Complaint   Patient presents with    Diabetes     3 month f/u, fasting     he is a 74y.o. year old male who presents for evaluation. See Diabetic Report Card listed above.      Patient Active Problem List    Diagnosis    Proteinuria    Vitamin D deficiency    Polyp of colon  Advance care planning    Type 2 diabetes mellitus with nephropathy ([facility] [address]    Hypokalemia    ICD (implantable cardioverter-defibrillator) in place    Peripheral vascular disease ([facility] [address]     A. RICKY (3/20/17): Right 0.58, Left 0.56.  Coronary artery disease involving native heart without angina pectoris    Systolic CHF, chronic (HCC)    Type 2 diabetes mellitus with complication (HCC)    Paroxysmal atrial fibrillation (HCC)    H/O: GI bleed    Hepatitis C    Chronic renal insufficiency    Dyslipidemia     A. FLP (1/19/17): Tot 120, , HDL 19, LDL 76 (no Rx).  Ischemic cardiomyopathy     A.  Echo (1/19/17):  EF 5-10% with severe GHK,. Mildly dil LA. Mild TR. PASP 46. B. Cath (1/20/17):  LM ost70. LAD m50. D1 80. LCx d70; OM1 99, OM2 90. RCA p100. No AVG. PAP  53/27/36. C.  PCI (2/9/17): pRCA 2.5x38 Xience + 2.75x12 Xience. ostLM 4.0x12 Xience post-dil with 4.5x12 NC. D.  Echo (2/13/17):  EF 10% with severe GHK, PSM. Dil LA. Mod MR. Mild to mod TR. Left pleural effusion. E.  Echo (5/15/17): EF 25% with severe GHK and basl-mid inf AK, mildly dil LV, DD. Sev dil LA. Mod MR. Mild TR. PASP 35. F.  ICD (6/12/17, Anup): [employer]. Reviewed PmHx, RxHx, FmHx, SocHx, AllgHx--dated and updated in the chart.     Review of Systems - negative except as listed above in the HPI    Objective:     Vitals:    06/06/18 1029   BP: 120/67   Pulse: 66   Resp: 20   Temp: 97.5 °F (36.4 °C)   SpO2: 93%   Weight: 197 lb (89.4 kg)   Height: 5' 10\" (1.778 m)     Physical Examination: General appearance - alert, well appearing, and in no distress  Neck - supple, no significant adenopathy  Chest - clear to auscultation, no wheezes, rales or rhonchi, symmetric air entry  Heart - normal rate, regular rhythm, normal S1, S2, no murmurs, rubs, clicks or gallops  Abdomen - soft, nontender, nondistended, no masses or organomegaly  Extremities - peripheral pulses normal, no pedal edema, no clubbing or cyanosis    Assessment/ Plan:   Diagnoses and all orders for this visit:    1. Type 2 diabetes mellitus with nephropathy (HCC)  -     LIPID PANEL  -     METABOLIC PANEL, COMPREHENSIVE  -     HEMOGLOBIN A1C WITH EAG  -     FUNDUS PHOTOGRAPHY  -at goal  I discussed with the patient the new \"Diabetes Full [place]\" outreach program.  Patient has agreed to participate and understands expectations and goals  Our brochure, along with the listed current labs and standards of care, was given to the patient. The patient also met with Luisa DOMINGUEZ), who will be contacting the patient outside of their appointments. 2. Dyslipidemia  -     LIPID PANEL  -     METABOLIC PANEL, COMPREHENSIVE  -goal LDL<70 due to CAD and DM hx    3. Hypertension, unspecified type  -     LIPID PANEL  -     METABOLIC PANEL, COMPREHENSIVE    4. Proteinuria, unspecified type  -stable       Follow-up Disposition:  Return in about 3 months (around 9/6/2018) for dm. Lab Results   Component Value Date/Time    Cholesterol, total 188 03/05/2018 10:07 AM    HDL Cholesterol 39 (L) 03/05/2018 10:07 AM    LDL, calculated 122 (H) 03/05/2018 10:07 AM    Triglyceride 137 03/05/2018 10:07 AM    CHOL/HDL Ratio 6.3 (H) 01/19/2017 03:50 AM     Lab Results   Component Value Date/Time    Hemoglobin A1c 6.4 (H) 03/05/2018 10:07 AM    Hemoglobin A1c 6.2 (H) 12/04/2017 10:17 AM    Hemoglobin A1c 13.5 (H) 01/19/2017 05:05 PM    Microalb/Creat ratio (ug/mg creat.) 106.5 (H) 12/04/2017 10:17 AM    LDL, calculated 122 (H) 03/05/2018 10:07 AM    Creatinine 1.53 (H) 05/23/2018 12:00 AM          Discussed with patient goal of Diabetes to include:  HgA1C <7, LDL cholesterol <70, Blood pressure <130/80. Discussed with patient diet and weight management and to get regular exercise. Recommend yearly eye exams and daily foot care. The patient understands and agrees with the plan.     I have discussed the diagnosis with the patient and the intended plan as seen in the above orders. The patient has received an after-visit summary and questions were answered concerning future plans. Medication Side Effects and Warnings were discussed with patient  Patient Labs were reviewed and or requested  Patient Past Records were reviewed and or requested    Kevan Bonilla M.D. [address] [facility]    There are no Patient Instructions on file for this visit.

## 2018-06-06 NOTE — MR AVS SNAPSHOT
[id number] [place] [address] 
[phone] Patient: Radha Nayak. MRN: [id number] Visit Information Date & Time Provider Department Dept. Phone Encounter #  
 6/6/2018 10:00 AM Tereza Macias MD [address] [facility] [phone] [id number] Follow-up Instructions Return in about 3 months (around 9/6/2018) for dm. Your Appointments 7/5/2018 11:00 AM  
Follow Up with JENI Mccain [address] ([address]) [facility] [address]  
[address] [address]  
  
    
 7/25/2018 10:00 AM  
ESTABLISHED PATIENT with Kimmie Frank MD  
[facility] ([address]) Appt Note: annual  
 [address] [facility] [address] Shivam [address] [facility]  
[phone]  
  
   
 320 [facility] Sh[address]am [address] [place] [id number]  
  
    
 7/25/2018 10:00 AM  
PACEMAKER with PACEMAKER, STFRANCES  
[facility] (SABRINA SCHEDULING) Appt Note: jen sci ICD/stf/study  sherin [address] Sh[address] 600 [facility] [address]  
[phone]  
  
   
 [address] [facility] [address] Shivam [address] [place] [id number]  
  
    
 9/27/2018 10:20 AM  
ESTABLISHED PATIENT with Neo Nation MD  
[facility] ([address]) Appt Note: 6 mo fu  
 [address] [facility] [address] Shviam [address] [place] [address]  
[address] [facility] [address] [address] Upcoming Health Maintenance Date Due  
 FOOT EXAM Q1 11/23/1962 EYE EXAM RETINAL OR DILATED Q1 11/23/1962 DTaP/Tdap/Td series (1 - Tdap) 11/23/1973 ZOSTER VACCINE AGE 60> 9/23/2012 GLAUCOMA SCREENING Q2Y 11/23/2017 Influenza Age 5 to Adult 8/1/2018 HEMOGLOBIN A1C Q6M 9/5/2018 Pneumococcal 65+ Low/Medium Risk (2 of 2 - PPSV23) 11/15/2018 MICROALBUMIN Q1 12/4/2018 COLONOSCOPY 2/5/2019 LIPID PANEL Q1 3/5/2019 MEDICARE YEARLY EXAM 3/6/2019 Allergies as of 6/6/2018  Review Complete On: 6/6/2018 By: Umang Rainey MD  
  
 Severity Noted Reaction Type Reactions Heparin (Porcine) High 02/07/2017    Other (comments) Was told when in the hospital that if he received heparin again that it would be deadly. Current Immunizations  Reviewed on 12/4/2017 Name Date Influenza Nasal Vaccine 11/15/2017 Pneumococcal Conjugate (PCV-13) 11/15/2017 Not reviewed this visit You Were Diagnosed With   
  
 Codes Comments Type 2 diabetes mellitus with nephropathy ([facility]    -  Primary ICD-10-CM: E11.21 
ICD-9-CM: 250.40, 583.81 Dyslipidemia     ICD-10-CM: E78.5 ICD-9-CM: 272.4 Hypertension, unspecified type     ICD-10-CM: I10 
ICD-9-CM: 401.9 Proteinuria, unspecified type     ICD-10-CM: R80.9 ICD-9-CM: 791.0 Vitals BP Pulse Temp Resp Height(growth percentile) Weight(growth percentile) 120/67 66 97.5 °F (36.4 °C) 20 5' 10\" (1.778 m) 197 lb (89.4 kg) SpO2 BMI Smoking Status 93% 28.27 kg/m2 Former Smoker Vitals History BMI and BSA Data Body Mass Index Body Surface Area  
 28.27 kg/m 2 2.1 m 2 Preferred Pharmacy Pharmacy Name Phone [address] [phone] Your Updated Medication List  
  
   
This list is accurate as of 6/6/18 10:50 AM.  Always use your most recent med list.  
  
  
  
  
 albuterol 90 mcg/actuation inhaler Commonly known as:  PROVENTIL HFA, VENTOLIN HFA, PROAIR HFA Take 1 Puff by inhalation every four (4) hours as needed for Wheezing. Dr Murali Paniagua or Dr. Windy Park to refill  
  
 amiodarone 200 mg tablet Commonly known as:  CORDARONE Take 1 Tab by mouth daily.  Indications: PREVENTION OF RECURRENT ATRIAL FIBRILLATION  
  
 aspirin 81 mg chewable tablet Take 81 mg by mouth daily. atorvastatin 10 mg tablet Commonly known as:  LIPITOR  
TAKE ONE TABLET BY MOUTH ONCE DAILY  
  
 bumetanide 1 mg tablet Commonly known as:  [place]ora Take 0.5 mg by mouth as needed (fluid retention). carvedilol 3.125 mg tablet Commonly known as:  COREG  
TAKE ONE TABLET BY MOUTH TWICE DAILY WITH MEALS DULoxetine 30 mg capsule Commonly known as:  CYMBALTA Take 1 Cap by mouth daily. Insulin Syringes (Disposable) 1 mL Syrg Pt to take 10 units w/ breakfast and 8 units w/ dinner  
  
 metoprolol succinate 50 mg XL tablet Commonly known as:  TOPROL XL Take 1 Tab by mouth daily for 360 days. Indications: chronic heart failure * NovoLIN 70/30 U-100 Insulin 100 unit/mL (70-30) injection Generic drug:  insulin NPH/insulin regular 10 Units by SubCUTAneous route Daily (before breakfast). * NovoLIN 70/30 U-100 Insulin 100 unit/mL (70-30) injection Generic drug:  insulin NPH/insulin regular  
8 Units by SubCUTAneous route Daily (before dinner). patiromer calcium sorbitex 8.4 gram powder Commonly known as:  VELTASSA Take 8.4 g by mouth daily. PLAVIX 75 mg Tab Generic drug:  clopidogrel Take  by mouth. sacubitril-valsartan  mg tablet Commonly known as:  ENTRESTO Take 1 Tab by mouth two (2) times a day. spironolactone 25 mg tablet Commonly known as:  ALDACTONE Take 1 Tab by mouth daily. TYLENOL EXTRA STRENGTH 500 mg tablet Generic drug:  acetaminophen Take 1,000 mg by mouth every six (6) hours as needed for Pain. Indications: BACK PAIN  
  
 VITAMIN D3 1,000 unit tablet Generic drug:  cholecalciferol Take 2 Tabs by mouth daily. * Notice: This list has 2 medication(s) that are the same as other medications prescribed for you. Read the directions carefully, and ask your doctor or other care provider to review them with you. We Performed the Following FUNDUS PHOTOGRAPHY [id number] CPT(R)] HEMOGLOBIN A1C WITH EAG [36459 CPT(R)] LIPID PANEL [30159 CPT(R)] METABOLIC PANEL, COMPREHENSIVE [76180 CPT(R)] Follow-up Instructions Return in about 3 months (around 9/6/2018) for dm. To-Do List   
 06/06/2018  1:00 PM  
(Arrive by 12:30 PM) Appointment with ECHO LAB [facility] at [facility] NON-INVASIVE CARD ([phone]) Please be prepared to remove everything from the waist up and put on a gown. Introducing [facility]! [employer] introduces [employer] patient portal. Now you can access parts of your medical record, email your doctor's office, and request medication refills online. 1. In your internet browser, go to [email or web address] 2. Click on the First Time User? Click Here link in the Sign In box. You will see the New Member Sign Up page. 3. Enter your [employer] Access Code exactly as it appears below. You will not need to use this code after youve completed the sign-up process. If you do not sign up before the expiration date, you must request a new code. · [employer] Access Code: [email or web address] Expires: 8/21/2018  2:19 PM 
 
4. Enter the last four digits of your Social Security Number (xxxx) and Date of Birth (mm/dd/yyyy) as indicated and click Submit. You will be taken to the next sign-up page. 5. Create a [employer] ID. This will be your [employer] login ID and cannot be changed, so think of one that is secure and easy to remember. 6. Create a [employer] password. You can change your password at any time. 7. Enter your Password Reset Question and Answer. This can be used at a later time if you forget your password. 8. Enter your e-mail address. You will receive e-mail notification when new information is available in [address] 9. Click Sign Up. You can now view and download portions of your medical record.  
10. Click the Download Summary menu link to download a portable copy of your medical information. If you have questions, please visit the Frequently Asked Questions section of the [employer] website. Remember, [employer] is NOT to be used for urgent needs. For medical emergencies, dial 911. Now available from your iPhone and Android! Please provide this summary of care documentation to your next provider. Your primary care clinician is listed as MICHAEL BALLARD. If you have any questions after today's visit, please call [phone].

## 2018-06-07 DIAGNOSIS — E78.5 DYSLIPIDEMIA: Primary | ICD-10-CM

## 2018-06-07 LAB
ALBUMIN SERPL-MCNC: 4.2 G/DL (ref 3.6–4.8)
ALBUMIN/GLOB SERPL: 1.8 {RATIO} (ref 1.2–2.2)
ALP SERPL-CCNC: 59 IU/L (ref 39–117)
ALT SERPL-CCNC: 10 IU/L (ref 0–44)
AST SERPL-CCNC: 12 IU/L (ref 0–40)
BILIRUB SERPL-MCNC: 1 MG/DL (ref 0–1.2)
BUN SERPL-MCNC: 31 MG/DL (ref 8–27)
BUN/CREAT SERPL: 17 (ref 10–24)
CALCIUM SERPL-MCNC: 9.1 MG/DL (ref 8.6–10.2)
CHLORIDE SERPL-SCNC: 102 MMOL/L (ref 96–106)
CHOLEST SERPL-MCNC: 225 MG/DL (ref 100–199)
CO2 SERPL-SCNC: 22 MMOL/L (ref 18–29)
CREAT SERPL-MCNC: 1.79 MG/DL (ref 0.76–1.27)
EST. AVERAGE GLUCOSE BLD GHB EST-MCNC: 137 MG/DL
GFR SERPLBLD CREATININE-BSD FMLA CKD-EPI: 39 ML/MIN/1.73
GFR SERPLBLD CREATININE-BSD FMLA CKD-EPI: 45 ML/MIN/1.73
GLOBULIN SER CALC-MCNC: 2.4 G/DL (ref 1.5–4.5)
GLUCOSE SERPL-MCNC: 151 MG/DL (ref 65–99)
HBA1C MFR BLD: 6.4 % (ref 4.8–5.6)
HDLC SERPL-MCNC: 34 MG/DL
INTERPRETATION, 910389: NORMAL
INTERPRETATION: NORMAL
LDLC SERPL CALC-MCNC: 156 MG/DL (ref 0–99)
Lab: NORMAL
PDF IMAGE, 910387: NORMAL
POTASSIUM SERPL-SCNC: 4.1 MMOL/L (ref 3.5–5.2)
PROT SERPL-MCNC: 6.6 G/DL (ref 6–8.5)
SODIUM SERPL-SCNC: 142 MMOL/L (ref 134–144)
TRIGL SERPL-MCNC: 176 MG/DL (ref 0–149)
VLDLC SERPL CALC-MCNC: 35 MG/DL (ref 5–40)

## 2018-06-07 RX ORDER — ATORVASTATIN CALCIUM 40 MG/1
TABLET, FILM COATED ORAL
Qty: 90 TAB | Refills: 1 | Status: SHIPPED | OUTPATIENT
Start: 2018-06-07 | End: 2019-04-02 | Stop reason: SDUPTHER

## 2018-06-07 NOTE — PROGRESS NOTES
Spoke to pt to review lab results. New cholesterol rx-Lipitor 40 mg sent in to pharm on file. Reviewed possible side effects and moa. Reviewed diet changes to help lower cholesterol. Glucose elevated from diabetes. Kidney fx stable   A1c at goal. Reviewed A1c of less than 7. Pt verbalizes understanding and agrees with plan.

## 2018-06-11 LAB
LEFT EYE DIABETIC RETINOPATHY: NORMAL
LEFT EYE IMAGE QUALITY: NORMAL
LEFT EYE MACULAR EDEMA: NORMAL
LEFT EYE OTHER RETINOPATHY: NORMAL
RESULT: NORMAL
RIGHT EYE DIABETIC RETINOPATHY: NORMAL
RIGHT EYE IMAGE QUALITY: NORMAL
RIGHT EYE MACULAR EDEMA: NORMAL
RIGHT EYE OTHER RETINOPATHY: NORMAL
SEVERITY: NORMAL

## 2018-06-18 ENCOUNTER — TELEPHONE (OUTPATIENT)
Dept: CARDIOLOGY CLINIC | Age: 68
End: 2018-06-18

## 2018-06-18 NOTE — TELEPHONE ENCOUNTER
Request for labs faxed to [place] with Dr. Tobias [facility]  #[phone]

## 2018-07-05 ENCOUNTER — OFFICE VISIT (OUTPATIENT)
Dept: CARDIOLOGY CLINIC | Age: 68
End: 2018-07-05

## 2018-07-05 VITALS
WEIGHT: 203 LBS | RESPIRATION RATE: 20 BRPM | DIASTOLIC BLOOD PRESSURE: 68 MMHG | SYSTOLIC BLOOD PRESSURE: 134 MMHG | HEART RATE: 60 BPM | BODY MASS INDEX: 29.06 KG/M2 | OXYGEN SATURATION: 97 % | HEIGHT: 70 IN | TEMPERATURE: 97.5 F

## 2018-07-05 DIAGNOSIS — I25.5 ISCHEMIC CARDIOMYOPATHY: Primary | ICD-10-CM

## 2018-07-05 DIAGNOSIS — I50.22 SYSTOLIC CHF, CHRONIC (HCC): ICD-10-CM

## 2018-07-05 DIAGNOSIS — E11.21 TYPE 2 DIABETES MELLITUS WITH NEPHROPATHY (HCC): ICD-10-CM

## 2018-07-05 DIAGNOSIS — I25.10 CORONARY ARTERY DISEASE INVOLVING NATIVE CORONARY ARTERY OF NATIVE HEART WITHOUT ANGINA PECTORIS: ICD-10-CM

## 2018-07-05 DIAGNOSIS — N18.9 CHRONIC RENAL IMPAIRMENT, UNSPECIFIED CKD STAGE: ICD-10-CM

## 2018-07-05 DIAGNOSIS — E11.8 TYPE 2 DIABETES MELLITUS WITH COMPLICATION, UNSPECIFIED WHETHER LONG TERM INSULIN USE: ICD-10-CM

## 2018-07-05 DIAGNOSIS — I48.0 PAROXYSMAL ATRIAL FIBRILLATION (HCC): ICD-10-CM

## 2018-07-05 DIAGNOSIS — I73.9 PERIPHERAL VASCULAR DISEASE (HCC): ICD-10-CM

## 2018-07-05 DIAGNOSIS — R06.09 DOE (DYSPNEA ON EXERTION): ICD-10-CM

## 2018-07-05 DIAGNOSIS — I73.9 CLAUDICATION (HCC): ICD-10-CM

## 2018-07-05 RX ORDER — IBUPROFEN 200 MG
1 TABLET ORAL EVERY 24 HOURS
Qty: 30 PATCH | Refills: 1 | Status: SHIPPED | OUTPATIENT
Start: 2018-07-05 | End: 2018-08-04

## 2018-07-05 NOTE — PROGRESS NOTES
[facility]  Note      DOS:  7/5/2018     PRIMARY CARE PHYSICIAN: Sai Harding MD  CARDIOLOGIST: Dr. Usha Alejandre   EP: Abbie Kaminski MD   Vascular: Dr. Bernal Leisure / Plan:  1. Chronic systolic heart failure (EF 25%), d/t ICM, ACC/AHA Stage D, NYHA class IIb   Continue entresto, aldactone, toprol    Use Prn bumex-   Labs today    Continue Veltassa   Echo at [address] since now optimized oGDMT- will follow up as it was ordered in May    Cardiac rehab if EF still low, otherwise he will qualify under CAD/stent and PAD   Continue daily weights, sodium and fluid restriction   Follow  Up in clinic in 2 months, sooner for problems    Encouraged to stop smoking   Labs today             2. PAD   Refer to  Dr. Kamar Franz as Mirna Patino. would like a surgeon at [facility]      Encouraged to exercise and consider cardiac rehab   Smoking cessation   DAPT, statin       3. CAD s/p PCI- he has seen Dr. Yogesh Palomino    Continue ASA, Plavix, BB and statin per cardiology.         4. Recurrent tobacco use   Counseled on smoking cessation   Nicoderm patch     5. PAF-- remains SR   Amiodarone to 200 mg daily- LFT, TSH OK   Amio level per Dr Sana Lind to follow    No anticoag 2/2 GI bleed/AVMs     6. Suspect KELLY and COPD   Refer to pulmonary for eval of COPD and sleep study- advised to reschedule appointment    Proventil  Inhaler prn - added     7. Incomplete LBBB  msec      8. CKD- Cr stable 1.79   Monitor    9. H/O lower GIB/s/p colorectal surgery negative genetics       10. Diabetes recent A1c 6    Insulin management by PCP    11. Paraesthesias 2/2 DM, PAD     12.    Liza Shelby- per PCP/cardiology             Chief Complaint   Patient presents with    Follow-up     HPI: 74y.o. year old male with a history of chronic HFrEF (EF 25%) secondary to ICM- CAD s/p PCI of LM,  of RCA s/p AICD complicated by  PAF (amiodarone) , Hep C, GI bleed (s/p colorectal surgery), and remote tobacco abuse. He presents to clinic today for follow up of his heart failure. He was last seen  ~ 6 weeks ago, pro BNP had increased to 1700 and he notes wheezing, so he was treated with prn bumex without improvement in the wheezing. His last echo  5/2017 shows EF 25%. He has not had the echo done that we ordered at the last visit. [facility] showed significant PAD, not amenable to  PTCA and the recommendations are for vascular surgery. He would like a surgeon at [facility]. He has activity limiting claudication, occurring with minimal activity. Stable NAVARRO with strenuous activity, energy level better. Unfortunately he has resumed smoking, and is smoking 1 ppd. He would like to resume the patch. Stable NAVARRO with prolonged walking or stair climbing. He notes some fatigue. He does not exercise regularly. He has not  attended cardiac rehab. His weight has remained stable at home (197 range), and he has not used bumex for past 6 months. He is limiting his sodium, K+,  and sugar, but not as conscious with healthy food choices or portion control. Glucose 125 range      He sleeps on one pillow and naps most days. He has not been evaluated for KELLY- appointment pending- he missed the appointment. Wife reports snoring and witnessed apnea. /70 range at home. He is compliant with medications. Denies exertional chest pain, palpitations, orthopnea, PND, syncope, near syncope, no AICD shocks, no bleeding. Fernanda Glasgow.  lives with his wife. He recently welcomed his 7th  great grandchild. He is retired from industrial pipe welding. He smoked 50 years- quit 1/2017. occasional etoh. He enjoys old movies, visiting with is 2 adult children and grand children. 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          Review of Systems: :    General:  Denies fever, chills, + fatigue  negative  HEENT: Post nasal gtt, Sinus congestion Pulmonary: Cough- after cigarette- clear phlegm worse in the morning, some wheezing- using proventil inhaler prn prior to bed   Cardiac: Per HPI   GI:  Denies N/V, abdominal pain, no bleeding or dark stool    Musculo: Arthritis, some neck pain  Neuro: + paraesthesia in feet, Denies TIA or CVA sx, sz,    Skin:  negative        CARDIAC EVALUATION   ECHO (2/13/17): EF 10% severe GHK, PSM. Dil LA. Mod MR. Mild to mod TR. Left pleural effusion  ECHO (1/19/17): EF 5-10% with severe GHK,. Mildly dil LA. Mild TR. PASP 46. ECHO: (5/15/17) EF 25%, severe GHK, basal-mid inferior AK, severe LAE, Mod MR, Mild TR,      CATH (1/20/17): LM ost70. LAD m50. D1 80. LCx d70; OM1 99, OM2 90. RCA p100. No AVG. PAP 53/27/36. --- s/p PCI (2/9/17): pRCA 2.5x38 Xience + 2.75x12 Xience. ostLM 4.0x12 Xience post-dil with 4.5x12 NC. ICD (6/12/17, Anup): [employer] single lead    CARDIAC MRI 1/31/17:LVEF 10%, LVH, RV dilated RVEF 25% GHK, marked LAE, mod CB, mild MR, mod-severe TR, The entire LAD territory : largely viable and should recover upon  revascularization. The entire RCA territory is largely viable and should recover  upon revascularization. The LCx territory demonstrate medium-size infarct with  limited viability. Large left-sided pleural effusion with passive atelectasis of the left lung. RICKY (3/20/17): R: 0.58, L: 0.56    EKG 5/3/18: SR,  ms, QTc 449 ms     History:  Past Medical History:   Diagnosis Date    Arthritis     hands/fingers    CAD (coronary artery disease)     involving native coronary artery of native heart without angina pectoris    Cardiomyopathy ([facility] 01/20/2017    A. Echo (1/19/17):  EF 5-10% with severe GHK,. Mildly dil LA. Mild TR. PASP 46./systemic cardiomyopathy    Chronic renal insufficiency 03/06/2017    Diabetes mellitus ([facility] 3/6/2017    IDDM    Dyslipidemia 3/6/2017    A. FLP (1/19/17): Tot 120, , HDL 19, LDL 76 (no Rx).     H/O: GI bleed 3/6/2017    Hepatitis C 3/6/2017    Ill-defined condition     hypokalemia    Ill-defined condition     flu 2018     Paroxysmal atrial fibrillation ([facility] 3/6/2017    Peripheral vascular disease ([facility] 2017    A. RICKY (3/20/17): Right 0.58, Left 0.56.  Vitamin D deficiency 2018     Past Surgical History:   Procedure Laterality Date    COLONOSCOPY N/A 2017    COLONOSCOPY performed by Pepito Kumar. Charley Cam MD at [address] COLONOSCOPY N/A 2018    COLONOSCOPY performed by Pepito Kumar. Charley Cam MD at [address] HX COLONOSCOPY      [facility] 2017    HX GI      colonoscopy x 3 - last 2017 - polyp    HX HEART CATHETERIZATION      3 heart stents    HX OTHER SURGICAL  2017    AICD    HX PACEMAKER      AICD     Social History     Social History    Marital status:      Spouse name: N/A    Number of children: N/A    Years of education: N/A     Occupational History    Not on file.      Social History Main Topics    Smoking status: Current Every Day Smoker     Packs/day: 1.00     Years: 45.00     Last attempt to quit: 2017    Smokeless tobacco: Never Used    Alcohol use No    Drug use: Yes     Special: Marijuana    Sexual activity: No     Other Topics Concern    Not on file     Social History Narrative     Family History   Problem Relation Age of Onset    Hypertension Mother     Heart Disease Mother      MURMUR    Cancer Father      COLON    Colon Cancer Father     Other Sister      born totally handicapped/epileptic    Kidney Disease Sister       from renal shutdown    Heart Disease Brother     Other Brother      ?pancreatic cancer or ?pancreatitis    Cancer Maternal Uncle      toes and spread    Cancer Paternal Uncle      stomach    Heart Attack Maternal Grandfather 47    Cancer Paternal Grandfather      bone    Cancer Maternal Uncle      stomach    Cancer Maternal Uncle      lung    Anesth Problems Neg Hx        Current Medications:   Current Outpatient Prescriptions Medication Sig Dispense Refill    atorvastatin (LIPITOR) 40 mg tablet TAKE ONE TABLET BY MOUTH ONCE DAILY  Indications: hyperlipidemia 90 Tab 1    cholecalciferol (VITAMIN D3) 1,000 unit tablet Take 2 Tabs by mouth daily.  metoprolol succinate (TOPROL XL) 50 mg XL tablet Take 1 Tab by mouth daily for 360 days. Indications: chronic heart failure 30 Tab 3    albuterol (PROVENTIL HFA, VENTOLIN HFA, PROAIR HFA) 90 mcg/actuation inhaler Take 1 Puff by inhalation every four (4) hours as needed for Wheezing. Dr Krishan Looney or Dr. Todd Jesus to refill 1 Inhaler 0    spironolactone (ALDACTONE) 25 mg tablet Take 1 Tab by mouth daily. 90 Tab 1    clopidogrel (PLAVIX) 75 mg tab Take  by mouth.  aspirin 81 mg chewable tablet Take 81 mg by mouth daily.  DULoxetine (CYMBALTA) 30 mg capsule Take 1 Cap by mouth daily. (Patient taking differently: Take 30 mg by mouth daily (with dinner). ) 90 Cap 3    amiodarone (CORDARONE) 200 mg tablet Take 1 Tab by mouth daily. Indications: PREVENTION OF RECURRENT ATRIAL FIBRILLATION 30 Tab 6    insulin NPH/insulin regular (NOVOLIN 70/30) 100 unit/mL (70-30) injection 10 Units by SubCUTAneous route Daily (before breakfast).  insulin NPH/insulin regular (NOVOLIN 70/30) 100 unit/mL (70-30) injection 8 Units by SubCUTAneous route Daily (before dinner).  sacubitril-valsartan (ENTRESTO) 97 mg/103 mg tablet Take 1 Tab by mouth two (2) times a day. 180 Tab 4    patiromer calcium sorbitex (VELTASSA) 8.4 gram powder Take 8.4 g by mouth daily. 4 Packet 0    bumetanide (BUMEX) 1 mg tablet Take 0.5 mg by mouth as needed (fluid retention).  acetaminophen (TYLENOL EXTRA STRENGTH) 500 mg tablet Take 1,000 mg by mouth every six (6) hours as needed for Pain.  Indications: BACK PAIN      carvedilol (COREG) 3.125 mg tablet TAKE ONE TABLET BY MOUTH TWICE DAILY WITH MEALS 60 Tab 4    Insulin Syringes, Disposable, 1 mL syrg Pt to take 10 units w/ breakfast and 8 units w/ dinner 500 Syringe 1       Allergies: Allergies   Allergen Reactions    Heparin (Porcine) Other (comments)     Was told when in the hospital that if he received heparin again that it would be deadly. Physical Exam:     Constitutional: A&O x 3, well developed, WM in NAD  -older than his stated age   HENT[de-identified] Normocephalic and atraumatic. mucous membranes pink, moist   Neck: Neck supple. No lymphadenopathy R carotid bruit   Cardiovascular: PMI nondisplaced, AICD R infraclavicular space, RRR, S1 & S2, 2/6 systolic murmur at apex, no S3, JVP ~10 cm, mild  HJR. Pulmonary/Chest: Effort normal, rhonchi on the left and end insp wheezing noted. .   Abdominal: Soft. Not distended, non-tender, + bowel sounds   Extremities: no edema, no cyanosis, feet warm , + femoral pulses, + bilateral femoral bruits, difficult to palpate distal pedal pulses    Neurological: A&O x 3 nofocal deficits    Skin: Skin is warm and dry. Psychiatric: He has a normal mood and affect.        Vitals:    Visit Vitals    /68 (BP 1 Location: Left arm, BP Patient Position: Sitting)    Pulse 60    Temp 97.5 °F (36.4 °C) (Oral)    Resp 20    Ht 5' 10\" (1.778 m)    Wt 203 lb (92.1 kg)    SpO2 97%    BMI 29.13 kg/m2           Recent Labs:   Lab Results   Component Value Date/Time    WBC 7.8 03/08/2018 02:42 PM    HGB 13.8 03/08/2018 02:42 PM    HCT 42.0 03/08/2018 02:42 PM    PLATELET 225 78/88/2437 02:42 PM    MCV 87.0 03/08/2018 02:42 PM     Lab Results   Component Value Date/Time    TSH 3.040 12/04/2017 10:17 AM      Lab Results   Component Value Date/Time    BNP 1209 (H) 01/21/2017 12:25 PM    NT pro-BNP 6258 (H) 01/31/2017 04:52 AM    NT pro-BNP 6094 (H) 01/30/2017 04:16 AM    NT pro-BNP 6168 (H) 01/29/2017 04:18 AM    NT pro-BNP 6876 (H) 01/28/2017 03:38 AM    NT pro-BNP 5427 (H) 01/27/2017 03:56 AM      Lab Results   Component Value Date/Time    Sodium 142 06/06/2018 10:55 AM    Potassium 4.1 06/06/2018 10:55 AM    Chloride 102 06/06/2018 10:55 AM CO2 22 06/06/2018 10:55 AM    Anion gap 6 03/08/2018 02:42 PM    Glucose 151 (H) 06/06/2018 10:55 AM    BUN 31 (H) 06/06/2018 10:55 AM    Creatinine 1.79 (H) 06/06/2018 10:55 AM    BUN/Creatinine ratio 17 06/06/2018 10:55 AM    GFR est AA 45 (L) 06/06/2018 10:55 AM    GFR est non-AA 39 (L) 06/06/2018 10:55 AM    Calcium 9.1 06/06/2018 10:55 AM    Bilirubin, total 1.0 06/06/2018 10:55 AM    ALT (SGPT) 10 06/06/2018 10:55 AM    AST (SGOT) 12 06/06/2018 10:55 AM    Alk. phosphatase 59 06/06/2018 10:55 AM    Protein, total 6.6 06/06/2018 10:55 AM    Albumin 4.2 06/06/2018 10:55 AM    Globulin 4.4 (H) 02/22/2017 03:16 AM    A-G Ratio 1.8 06/06/2018 10:55 AM      Lab Results   Component Value Date/Time    Hemoglobin A1c 6.4 (H) 06/06/2018 10:55 AM    Hemoglobin A1c (POC) 6.4 08/17/2017 11:00 AM          I have discussed the diagnosis with  Mary Lala. and the intended plan as seen in the above orders. Questions were answered concerning future plans, and written instructions provided. I have discussed medication side effects and warnings with the patient as well. Thank you for allowing me to participate in the care of this delightful gentleman. Please do not hesitate to call with any questions. Opal Hodge  RN, ACNP-BC, [address] [address] [phone]  24 hour VAD/HF Pager: [phone]

## 2018-07-05 NOTE — MR AVS SNAPSHOT
[facility] 
 
 
 [address] [facility] [address] 1400 [address] [place] [address] 
[phone] Patient: Zoila Rodriguez MRN: [id number] CPZ:[id number]/14/[id number] Visit Information Date & Time Provider Department Dept. Phone Encounter #  
 7/5/2018 11:00 AM Jen Ovalles 3. [phone] [id number] Follow-up Instructions Return in about 2 months (around 9/5/2018). Your Appointments 7/25/2018 10:00 AM  
ESTABLISHED PATIENT with Hailey Arriaga MD  
[facility] ([facility]) Appt Note: annual  
 [address] Sh[address] [facility] [address] [facility] [address]  
[phone]  
  
   
 [address] [facility] [address] [facility] [address] [address]  
  
    
 7/25/2018 10:00 AM  
PACEMAKER with PACEMAKER, STFRANCES  
[facility] (SABRINA SCHEDULING) Appt Note: jen sci ICD/stf/study  sherin [address] 600 [facility] [address] [address]  
[phone]  
  
   
 [address] Shivam [address] [place] [address]  
  
    
 9/6/2018  2:00 PM  
Any with Nargis Broussard MD  
[address] [place] [address] [facility] Appt Note: 3mo fuv  
 [address] [address] [id number] [phone]  
  
   
 [address] [address] [address]  
  
    
 9/27/2018 10:20 AM  
ESTABLISHED PATIENT with Christelle Houser MD  
[facility] ([facility]) Appt Note: 6 mo fu  
 [address]  
[address] [facility] Shivam [address] [address] Upcoming Health Maintenance Date Due  
 FOOT EXAM Q1 11/23/1962 DTaP/Tdap/Td series (1 - Tdap) 11/23/1973 ZOSTER VACCINE AGE 60> 9/23/2012 GLAUCOMA SCREENING Q2Y 11/23/2017 Influenza Age 5 to Adult 8/1/2018 Pneumococcal 65+ Low/Medium Risk (2 of 2 - PPSV23) 11/15/2018 MICROALBUMIN Q1 12/4/2018 HEMOGLOBIN A1C Q6M 12/6/2018 COLONOSCOPY 2/5/2019 MEDICARE YEARLY EXAM 3/6/2019 EYE EXAM RETINAL OR DILATED Q1 6/6/2019 LIPID PANEL Q1 6/6/2019 Allergies as of 7/5/2018  Review Complete On: 7/5/2018 By: JENI Shelton Severity Noted Reaction Type Reactions Heparin (Porcine) High 02/07/2017    Other (comments) Was told when in the hospital that if he received heparin again that it would be deadly. Current Immunizations  Reviewed on 12/4/2017 Name Date Influenza Nasal Vaccine 11/15/2017 Pneumococcal Conjugate (PCV-13) 11/15/2017 Not reviewed this visit You Were Diagnosed With   
  
 Codes Comments Ischemic cardiomyopathy    -  Primary ICD-10-CM: I25.5 ICD-9-CM: 414.8 Systolic CHF, chronic (HCC)     ICD-10-CM: I50.22 ICD-9-CM: 428.22, 428.0 Paroxysmal atrial fibrillation (HCC)     ICD-10-CM: I48.0 ICD-9-CM: 427.31 Coronary artery disease involving native coronary artery of native heart without angina pectoris     ICD-10-CM: I25.10 ICD-9-CM: 414.01 Chronic renal impairment, unspecified CKD stage     ICD-10-CM: N18.9 ICD-9-CM: 072. 9 Type 2 diabetes mellitus with complication, unspecified whether long term insulin use (HCC)     ICD-10-CM: E11.8 ICD-9-CM: 250.90 Peripheral vascular disease ([address]     ICD-10-CM: I73.9 ICD-9-CM: 443. 9 Type 2 diabetes mellitus with nephropathy (HCC)     ICD-10-CM: E11.21 
ICD-9-CM: 250.40, 583.81   
 NAVARRO (dyspnea on exertion)     ICD-10-CM: R06.09 
ICD-9-CM: 786.09 Claudication [facility])     ICD-10-CM: I73.9 ICD-9-CM: 443. 9 Vitals BP Pulse Temp Resp Height(growth percentile) Weight(growth percentile) 134/68 (BP 1 Location: Left arm, BP Patient Position: Sitting) 60 97.5 °F (36.4 °C) (Oral) 20 5' 10\" (1.778 m) 203 lb (92.1 kg) SpO2 BMI Smoking Status 97% 29.13 kg/m2 Current Every Day Smoker Vitals History BMI and BSA Data Body Mass Index Body Surface Area 29.13 kg/m 2 2.13 m 2 Preferred Pharmacy Pharmacy Name Phone [address], [address] [phone] Your Updated Medication List  
  
   
This list is accurate as of 7/5/18 12:04 PM.  Always use your most recent med list.  
  
  
  
  
 albuterol 90 mcg/actuation inhaler Commonly known as:  PROVENTIL HFA, VENTOLIN HFA, PROAIR HFA Take 1 Puff by inhalation every four (4) hours as needed for Wheezing. Dr Bianca Foster or Dr. Hallie Griffin to refill  
  
 amiodarone 200 mg tablet Commonly known as:  CORDARONE Take 1 Tab by mouth daily. Indications: PREVENTION OF RECURRENT ATRIAL FIBRILLATION  
  
 aspirin 81 mg chewable tablet Take 81 mg by mouth daily. atorvastatin 40 mg tablet Commonly known as:  LIPITOR  
TAKE ONE TABLET BY MOUTH ONCE DAILY  Indications: hyperlipidemia  
  
 bumetanide 1 mg tablet Commonly known as:  Rebollar Alnana Take 0.5 mg by mouth as needed (fluid retention). carvedilol 3.125 mg tablet Commonly known as:  COREG  
TAKE ONE TABLET BY MOUTH TWICE DAILY WITH MEALS DULoxetine 30 mg capsule Commonly known as:  CYMBALTA Take 1 Cap by mouth daily. Insulin Syringes (Disposable) 1 mL Syrg Pt to take 10 units w/ breakfast and 8 units w/ dinner  
  
 metoprolol succinate 50 mg XL tablet Commonly known as:  TOPROL XL Take 1 Tab by mouth daily for 360 days. Indications: chronic heart failure  
  
 nicotine 21 mg/24 hr  
Commonly known as:  NICODERM CQ  
1 Patch by TransDERmal route every twenty-four (24) hours for 30 days. Tapering dose  Indications: Smoking Cessation * NovoLIN 70/30 U-100 Insulin 100 unit/mL (70-30) injection Generic drug:  insulin NPH/insulin regular 10 Units by SubCUTAneous route Daily (before breakfast). * NovoLIN 70/30 U-100 Insulin 100 unit/mL (70-30) injection Generic drug:  insulin NPH/insulin regular  
8 Units by SubCUTAneous route Daily (before dinner). patiromer calcium sorbitex 8.4 gram powder Commonly known as:  VELTASSA Take 8.4 g by mouth daily. PLAVIX 75 mg Tab Generic drug:  clopidogrel Take  by mouth. sacubitril-valsartan  mg tablet Commonly known as:  ENTRESTO Take 1 Tab by mouth two (2) times a day. spironolactone 25 mg tablet Commonly known as:  ALDACTONE Take 1 Tab by mouth daily. TYLENOL EXTRA STRENGTH 500 mg tablet Generic drug:  acetaminophen Take 1,000 mg by mouth every six (6) hours as needed for Pain. Indications: BACK PAIN  
  
 VITAMIN D3 1,000 unit tablet Generic drug:  cholecalciferol Take 2 Tabs by mouth daily. * Notice: This list has 2 medication(s) that are the same as other medications prescribed for you. Read the directions carefully, and ask your doctor or other care provider to review them with you. Prescriptions Sent to Pharmacy Refills  
 nicotine (NICODERM CQ) 21 mg/24 hr 1 Si Patch by TransDERmal route every twenty-four (24) hours for 30 days. Tapering dose  Indications: Smoking Cessation Class: Normal  
 [facility] #: [phone] Route: TransDERmal  
  
We Performed the Following METABOLIC PANEL, BASIC [77457 CPT(R)] NT-PRO BNP T7571165 CPT(R)] REFERRAL TO VASCULAR SURGERY [BWQ042 Custom] Follow-up Instructions Return in about 2 months (around 2018). Referral Information Referral ID Referred By Referred To  
  
 [id number] [place], [address] [address] [address] [address] [facility], [address] Phone: [phone] Visits Status Start Date End Date 1 New Request 18 If your referral has a status of pending review or denied, additional information will be sent to support the outcome of this decision. Patient Instructions Continue same medications See lung doctors- COPD and sleep apnea Echo at [address] [facility] See vascular surgeon- Dr Rosa Pierre or associate at [address] [place] [address] today STOP smoking Consider cardiac rehab for your heart and legs Continue daily weights (in the morning, after passing your urine). Notify HF team of overnight weight gains > 2 lbs or weekly >5 lbs or if any of the following Sx. Continue to limit sodium intake & monitor your fluid intake (not to exceed 2L per day). Call us for any problems,  questions, or concerns. Introducing [facility]! [facility] introduces Appwapp patient portal. Now you can access parts of your medical record, email your doctor's office, and request medication refills online. 1. In your internet browser, go to [email or web address] 2. Click on the First Time User? Click Here link in the Sign In box. You will see the New Member Sign Up page. 3. Enter your Appwapp Access Code exactly as it appears below. You will not need to use this code after youve completed the sign-up process. If you do not sign up before the expiration date, you must request a new code. · Appwapp Access Code: [email or web address] Expires: 8/21/2018  2:19 PM 
 
4. Enter the last four digits of your Social Security Number (xxxx) and Date of Birth (mm/dd/yyyy) as indicated and click Submit. You will be taken to the next sign-up page. 5. Create a Appwapp ID. This will be your Appwapp login ID and cannot be changed, so think of one that is secure and easy to remember. 6. Create a Appwapp password. You can change your password at any time. 7. Enter your Password Reset Question and Answer. This can be used at a later time if you forget your password. 8. Enter your e-mail address. You will receive e-mail notification when new information is available in [address] 9. Click Sign Up. You can now view and download portions of your medical record. 10. Click the Download Summary menu link to download a portable copy of your medical information. If you have questions, please visit the Frequently Asked Questions section of the Gr[employer] website. Remember, Gr[employer] is NOT to be used for urgent needs. For medical emergencies, dial 911. Now available from your iPhone and Android! Please provide this summary of care documentation to your next provider. Your primary care clinician is listed as MICHAEL BALLARD. If you have any questions after today's visit, please call [phone].

## 2018-07-05 NOTE — COMMUNICATION BODY
[facility]  Note      DOS:  7/5/2018     PRIMARY CARE PHYSICIAN: Jose Dior MD  CARDIOLOGIST: Dr. Oren Garzon   EP: Cornelio Muhammad MD   Vascular: Dr. Abida Das / Plan:  1. Chronic systolic heart failure (EF 25%), d/t ICM, ACC/AHA Stage D, NYHA class IIb   Continue entresto, aldactone, toprol    Use Prn bumex-   Labs today    Continue Veltassa   Echo at [address] since now optimized oGDMT- will follow up as it was ordered in May    Cardiac rehab if EF still low, otherwise he will qualify under CAD/stent and PAD   Continue daily weights, sodium and fluid restriction   Follow  Up in clinic in 2 months, sooner for problems    Encouraged to stop smoking   Labs today             2. PAD   Refer to  Dr. Catina Urrutia as Preet Ernandez. would like a surgeon at [facility]      Encouraged to exercise and consider cardiac rehab   Smoking cessation   DAPT, statin       3. CAD s/p PCI- he has seen Dr. Ty Soria    Continue ASA, Plavix, BB and statin per cardiology.         4. Recurrent tobacco use   Counseled on smoking cessation   Nicoderm patch     5. PAF-- remains SR   Amiodarone to 200 mg daily- LFT, TSH OK   Amio level per Dr Dany August to follow    No anticoag 2/2 GI bleed/AVMs     6. Suspect KELLY and COPD   Refer to pulmonary for eval of COPD and sleep study- advised to reschedule appointment    Proventil  Inhaler prn - added     7. Incomplete LBBB  msec      8. CKD- Cr stable 1.79   Monitor    9. H/O lower GIB/s/p colorectal surgery negative genetics       10. Diabetes recent A1c 6    Insulin management by PCP    11. Paraesthesias 2/2 DM, PAD     12.    Fabián Underwood- per PCP/cardiology             Chief Complaint   Patient presents with    Follow-up     HPI: 74y.o. year old male with a history of chronic HFrEF (EF 25%) secondary to ICM- CAD s/p PCI of LM,  of RCA s/p AICD complicated by  PAF (amiodarone) , Hep C, GI bleed (s/p colorectal surgery), and remote tobacco abuse. He presents to clinic today for follow up of his heart failure. He was last seen  ~ 6 weeks ago, pro BNP had increased to 1700 and he notes wheezing, so he was treated with prn bumex without improvement in the wheezing. His last echo  5/2017 shows EF 25%. He has not had the echo done that we ordered at the last visit. Since last visit seen Dr. Zenaida Tran underwent angiogram that showed significant PAD, not amenable to  PTCA and the recommendations are for vascular surgery. He would like a surgeon at [facility]. He has activity limiting claudication, occurring with minimal activity. Stable NAVARRO with strenuous activity, energy level better. Unfortunately he has resumed smoking, and is smoking 1 ppd. He would like to resume the patch. Stable NAVARRO with prolonged walking or stair climbing. He notes some fatigue. He does not exercise regularly. He has not  attended cardiac rehab. His weight has remained stable at home (197 range), and he has not used bumex for past 6 months. He is limiting his sodium, K+,  and sugar, but not as conscious with healthy food choices or portion control. Glucose 125 range      He sleeps on one pillow and naps most days. He has not been evaluated for KELLY- appointment pending- he missed the appointment. Wife reports snoring and witnessed apnea. /70 range at home. He is compliant with medications. Denies exertional chest pain, palpitations, orthopnea, PND, syncope, near syncope, no AICD shocks, no bleeding. Fernanda Calderon.  lives with his wife. He recently welcomed his 7th  great grandchild. He is retired from industrial pipe welding. He smoked 50 years- quit 1/2017. occasional etoh. He enjoys old movies, visiting with is 2 adult children and grand children. 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          Review of Systems: :    General:  Denies fever, chills, + fatigue  negative  HEENT: Post nasal gtt, Sinus congestion Pulmonary: Cough- after cigarette- clear phlegm worse in the morning, some wheezing- using proventil inhaler prn prior to bed   Cardiac: Per HPI   GI:  Denies N/V, abdominal pain, no bleeding or dark stool    Musculo: Arthritis, some neck pain  Neuro: + paraesthesia in feet, Denies TIA or CVA sx, sz,    Skin:  negative        CARDIAC EVALUATION   ECHO (2/13/17): EF 10% severe GHK, PSM. Dil LA. Mod MR. Mild to mod TR. Left pleural effusion  ECHO (1/19/17): EF 5-10% with severe GHK,. Mildly dil LA. Mild TR. PASP 46. ECHO: (5/15/17) EF 25%, severe GHK, basal-mid inferior AK, severe LAE, Mod MR, Mild TR,      CATH (1/20/17): LM ost70. LAD m50. D1 80. LCx d70; OM1 99, OM2 90. RCA p100. No AVG. PAP 53/27/36. --- s/p PCI (2/9/17): pRCA 2.5x38 Xience + 2.75x12 Xience. ostLM 4.0x12 Xience post-dil with 4.5x12 NC. ICD (6/12/17, Anup): [employer] single lead    CARDIAC MRI 1/31/17:LVEF 10%, LVH, RV dilated RVEF 25% GHK, marked LAE, mod CB, mild MR, mod-severe TR, The entire LAD territory : largely viable and should recover upon  revascularization. The entire RCA territory is largely viable and should recover  upon revascularization. The LCx territory demonstrate medium-size infarct with  limited viability. Large left-sided pleural effusion with passive atelectasis of the left lung. RICKY (3/20/17): R: 0.58, L: 0.56    EKG 5/3/18: SR,  ms, QTc 449 ms     History:  Past Medical History:   Diagnosis Date    Arthritis     hands/fingers    CAD (coronary artery disease)     involving native coronary artery of native heart without angina pectoris    Cardiomyopathy ([facility] 01/20/2017    A. Echo (1/19/17):  EF 5-10% with severe GHK,. Mildly dil LA. Mild TR. PASP 46./systemic cardiomyopathy    Chronic renal insufficiency 03/06/2017    Diabetes mellitus ([facility] 3/6/2017    IDDM    Dyslipidemia 3/6/2017    A. FLP (1/19/17): Tot 120, , HDL 19, LDL 76 (no Rx).     H/O: GI bleed 3/6/2017    Hepatitis C 3/6/2017    Ill-defined condition     hypokalemia    Ill-defined condition     flu 2018     Paroxysmal atrial fibrillation ([facility] 3/6/2017    Peripheral vascular disease ([facility] 2017    A. RICKY (3/20/17): Right 0.58, Left 0.56.  Vitamin D deficiency 2018     Past Surgical History:   Procedure Laterality Date    COLONOSCOPY N/A 2017    COLONOSCOPY performed by Fran Lin. Theron Regalado MD at [address] COLONOSCOPY N/A 2018    COLONOSCOPY performed by Fran Lin. Theron Regalado MD at [address] HX COLONOSCOPY      [facility] 2017    HX GI      colonoscopy x 3 - last 2017 - polyp    HX HEART CATHETERIZATION      3 heart stents    HX OTHER SURGICAL  2017    AICD    HX PACEMAKER      AICD     Social History     Social History    Marital status:      Spouse name: N/A    Number of children: N/A    Years of education: N/A     Occupational History    Not on file.      Social History Main Topics    Smoking status: Current Every Day Smoker     Packs/day: 1.00     Years: 45.00     Last attempt to quit: 2017    Smokeless tobacco: Never Used    Alcohol use No    Drug use: Yes     Special: Marijuana    Sexual activity: No     Other Topics Concern    Not on file     Social History Narrative     Family History   Problem Relation Age of Onset    Hypertension Mother     Heart Disease Mother      MURMUR    Cancer Father      COLON    Colon Cancer Father     Other Sister      born totally handicapped/epileptic    Kidney Disease Sister       from renal shutdown    Heart Disease Brother     Other Brother      ?pancreatic cancer or ?pancreatitis    Cancer Maternal Uncle      toes and spread    Cancer Paternal Uncle      stomach    Heart Attack Maternal Grandfather 47    Cancer Paternal Grandfather      bone    Cancer Maternal Uncle      stomach    Cancer Maternal Uncle      lung    Anesth Problems Neg Hx        Current Medications:   Current Outpatient Prescriptions Medication Sig Dispense Refill    atorvastatin (LIPITOR) 40 mg tablet TAKE ONE TABLET BY MOUTH ONCE DAILY  Indications: hyperlipidemia 90 Tab 1    cholecalciferol (VITAMIN D3) 1,000 unit tablet Take 2 Tabs by mouth daily.  metoprolol succinate (TOPROL XL) 50 mg XL tablet Take 1 Tab by mouth daily for 360 days. Indications: chronic heart failure 30 Tab 3    albuterol (PROVENTIL HFA, VENTOLIN HFA, PROAIR HFA) 90 mcg/actuation inhaler Take 1 Puff by inhalation every four (4) hours as needed for Wheezing. Dr Bianca Foster or Dr. Hallie Griffin to refill 1 Inhaler 0    spironolactone (ALDACTONE) 25 mg tablet Take 1 Tab by mouth daily. 90 Tab 1    clopidogrel (PLAVIX) 75 mg tab Take  by mouth.  aspirin 81 mg chewable tablet Take 81 mg by mouth daily.  DULoxetine (CYMBALTA) 30 mg capsule Take 1 Cap by mouth daily. (Patient taking differently: Take 30 mg by mouth daily (with dinner). ) 90 Cap 3    amiodarone (CORDARONE) 200 mg tablet Take 1 Tab by mouth daily. Indications: PREVENTION OF RECURRENT ATRIAL FIBRILLATION 30 Tab 6    insulin NPH/insulin regular (NOVOLIN 70/30) 100 unit/mL (70-30) injection 10 Units by SubCUTAneous route Daily (before breakfast).  insulin NPH/insulin regular (NOVOLIN 70/30) 100 unit/mL (70-30) injection 8 Units by SubCUTAneous route Daily (before dinner).  sacubitril-valsartan (ENTRESTO) 97 mg/103 mg tablet Take 1 Tab by mouth two (2) times a day. 180 Tab 4    patiromer calcium sorbitex (VELTASSA) 8.4 gram powder Take 8.4 g by mouth daily. 4 Packet 0    bumetanide (BUMEX) 1 mg tablet Take 0.5 mg by mouth as needed (fluid retention).  acetaminophen (TYLENOL EXTRA STRENGTH) 500 mg tablet Take 1,000 mg by mouth every six (6) hours as needed for Pain.  Indications: BACK PAIN      carvedilol (COREG) 3.125 mg tablet TAKE ONE TABLET BY MOUTH TWICE DAILY WITH MEALS 60 Tab 4    Insulin Syringes, Disposable, 1 mL syrg Pt to take 10 units w/ breakfast and 8 units w/ dinner 500 Syringe 1       Allergies: Allergies   Allergen Reactions    Heparin (Porcine) Other (comments)     Was told when in the hospital that if he received heparin again that it would be deadly. Physical Exam:     Constitutional: A&O x 3, well developed, WM in NAD  -older than his stated age   HENT[de-identified] Normocephalic and atraumatic. mucous membranes pink, moist   Neck: Neck supple. No lymphadenopathy R carotid bruit   Cardiovascular: PMI nondisplaced, AICD R infraclavicular space, RRR, S1 & S2, 2/6 systolic murmur at apex, no S3, JVP ~10 cm, mild  HJR. Pulmonary/Chest: Effort normal, rhonchi on the left and end insp wheezing noted. .   Abdominal: Soft. Not distended, non-tender, + bowel sounds   Extremities: no edema, no cyanosis, feet warm , + femoral pulses, + bilateral femoral bruits, difficult to palpate distal pedal pulses    Neurological: A&O x 3 nofocal deficits    Skin: Skin is warm and dry. Psychiatric: He has a normal mood and affect.        Vitals:    Visit Vitals    /68 (BP 1 Location: Left arm, BP Patient Position: Sitting)    Pulse 60    Temp 97.5 °F (36.4 °C) (Oral)    Resp 20    Ht 5' 10\" (1.778 m)    Wt 203 lb (92.1 kg)    SpO2 97%    BMI 29.13 kg/m2           Recent Labs:   Lab Results   Component Value Date/Time    WBC 7.8 03/08/2018 02:42 PM    HGB 13.8 03/08/2018 02:42 PM    HCT 42.0 03/08/2018 02:42 PM    PLATELET 839 36/84/8328 02:42 PM    MCV 87.0 03/08/2018 02:42 PM     Lab Results   Component Value Date/Time    TSH 3.040 12/04/2017 10:17 AM      Lab Results   Component Value Date/Time    BNP 1209 (H) 01/21/2017 12:25 PM    NT pro-BNP 6258 (H) 01/31/2017 04:52 AM    NT pro-BNP 6094 (H) 01/30/2017 04:16 AM    NT pro-BNP 6168 (H) 01/29/2017 04:18 AM    NT pro-BNP 6876 (H) 01/28/2017 03:38 AM    NT pro-BNP 5427 (H) 01/27/2017 03:56 AM      Lab Results   Component Value Date/Time    Sodium 142 06/06/2018 10:55 AM    Potassium 4.1 06/06/2018 10:55 AM    Chloride 102 06/06/2018 10:55 AM CO2 22 06/06/2018 10:55 AM    Anion gap 6 03/08/2018 02:42 PM    Glucose 151 (H) 06/06/2018 10:55 AM    BUN 31 (H) 06/06/2018 10:55 AM    Creatinine 1.79 (H) 06/06/2018 10:55 AM    BUN/Creatinine ratio 17 06/06/2018 10:55 AM    GFR est AA 45 (L) 06/06/2018 10:55 AM    GFR est non-AA 39 (L) 06/06/2018 10:55 AM    Calcium 9.1 06/06/2018 10:55 AM    Bilirubin, total 1.0 06/06/2018 10:55 AM    ALT (SGPT) 10 06/06/2018 10:55 AM    AST (SGOT) 12 06/06/2018 10:55 AM    Alk. phosphatase 59 06/06/2018 10:55 AM    Protein, total 6.6 06/06/2018 10:55 AM    Albumin 4.2 06/06/2018 10:55 AM    Globulin 4.4 (H) 02/22/2017 03:16 AM    A-G Ratio 1.8 06/06/2018 10:55 AM      Lab Results   Component Value Date/Time    Hemoglobin A1c 6.4 (H) 06/06/2018 10:55 AM    Hemoglobin A1c (POC) 6.4 08/17/2017 11:00 AM          I have discussed the diagnosis with  Luciana Carrillo and the intended plan as seen in the above orders. Questions were answered concerning future plans, and written instructions provided. I have discussed medication side effects and warnings with the patient as well. Thank you for allowing me to participate in the care of this delightful gentleman. Please do not hesitate to call with any questions. Opal Mcintyre  RN, ACNP-BC, [address] [place] [address] [phone]  24 hour VAD/HF Pager: [phone]

## 2018-07-05 NOTE — PATIENT INSTRUCTIONS
Continue same medications    See lung doctors- COPD and sleep apnea    Echo at [address] [facility] [address]    See vascular surgeon- Dr Jennifer Gunter or associate at [address] today     STOP smoking    Consider cardiac rehab for your heart and legs      Continue daily weights (in the morning, after passing your urine). Notify HF team of overnight weight gains > 2 lbs or weekly >5 lbs or if any of the following Sx. Continue to limit sodium intake & monitor your fluid intake (not to exceed 2L per day). Call us for any problems,  questions, or concerns.

## 2018-07-05 NOTE — LETTER
7/5/2018 12:12 PM 
 
Patient:  Heath Hahn. YOB: 1950 Date of Visit: 7/5/2018 Dear Marcos Huerta MD 
[address] [address] VIA In Basket Elfego Ritchie MD 
[address] [facility] [address] VIA In Basket Terrie Dey MD 
[address] [address] VIA In Basket Tyrel Chirinos MD 
[address] [facility] [address] [facility] 7 [address] VIA Facsimile: [phone] 
 : Thank you for referring MrDayanara Crawford to me for evaluation/treatment. Below are the relevant portions of my assessment and plan of care. [place]  Note DOS:  7/5/2018 PRIMARY CARE PHYSICIAN: Marcos Huerta MD 
CARDIOLOGIST: Dr. Terrie Dey  
EP: Corey Gerber MD  
Vascular: Dr. Tameka Denny Impression / Plan: 1. Chronic systolic heart failure (EF 25%), d/t ICM, ACC/AHA Stage D, NYHA class IIb Continue entresto, aldactone, toprol Use Prn bumex-   Labs today Continue [place] Echo at [address] since now optimized oGDMT- will follow up as it was ordered in May Cardiac rehab if EF still low, otherwise he will qualify under CAD/stent and PAD Continue daily weights, sodium and fluid restriction Follow  Up in clinic in 2 months, sooner for problems Encouraged to stop smoking Labs today 2. PAD Refer to  Dr. Dilcia Kiser as Heath Hahn. would like a surgeon at [facility] Encouraged to exercise and consider cardiac rehab Smoking cessation DAPT, statin 3. CAD s/p PCI- he has seen Dr. Ajit Goldman  
 Continue ASA, Plavix, BB and statin per cardiology.    
   
4. Recurrent tobacco use Counseled on smoking cessation Nicoderm patch 5. PAF-- remains SR Amiodarone to 200 mg daily- LFT, TSH OK Amio level per Dr Breanne Brown to follow No anticoag 2/2 GI bleed/AVMs 6.  Suspect KELLY and COPD 
 Refer to pulmonary for eval of COPD and sleep study- advised to reschedule appointment Proventil  Inhaler prn - added 7. Incomplete LBBB  msec 8. CKD- Cr stable 1.79 Monitor 9. H/O lower GIB/s/p colorectal surgery negative genetics 10. Diabetes recent A1c 6 Insulin management by PCP 11. Paraesthesias 2/2 DM, PAD 12. XOL- per PCP/cardiology Chief Complaint Patient presents with  Follow-up HPI: 74y.o. year old male with a history of chronic HFrEF (EF 25%) secondary to ICM- CAD s/p PCI of LM,  of RCA s/p AICD complicated by  PAF (amiodarone) , Hep C, GI bleed (s/p colorectal surgery), and remote tobacco abuse. He presents to clinic today for follow up of his heart failure. He was last seen  ~ 6 weeks ago, pro BNP had increased to 1700 and he notes wheezing, so he was treated with prn bumex without improvement in the wheezing. His last echo  5/2017 shows EF 25%. He has not had the echo done that we ordered at the last visit. Since last visit seen Dr. Prasanth Gomez underwent angiogram that showed significant PAD, not amenable to  PTCA and the recommendations are for vascular surgery. He would like a surgeon at [facility]. He has activity limiting claudication, occurring with minimal activity. Stable NAVARRO with strenuous activity, energy level better. Unfortunately he has resumed smoking, and is smoking 1 ppd. He would like to resume the patch. Stable NAVARRO with prolonged walking or stair climbing. He notes some fatigue. He does not exercise regularly. He has not  attended cardiac rehab. His weight has remained stable at home (197 range), and he has not used bumex for past 6 months. He is limiting his sodium, K+,  and sugar, but not as conscious with healthy food choices or portion control. Glucose 125 range He sleeps on one pillow and naps most days.  He has not been evaluated for KELLY- appointment pending- he missed the appointment. Wife reports snoring and witnessed apnea. /70 range at home. He is compliant with medications. Denies exertional chest pain, palpitations, orthopnea, PND, syncope, near syncope, no AICD shocks, no bleeding. Fernanda Gaines.  lives with his wife. He recently welcomed his 7th  great grandchild. He is retired from industrial pipe welding. He smoked 50 years- quit 1/2017. occasional etoh. He enjoys old movies, visiting with is 2 adult children and grand children. He enjoys metal [employer] for [employer] relics. Review of Systems: :   
General:  Denies fever, chills, + fatigue  negative HEENT: Post nasal gtt, Sinus congestion Pulmonary: Cough- after cigarette- clear phlegm worse in the morning, some wheezing- using proventil inhaler prn prior to bed Cardiac: Per HPI  
GI:  Denies N/V, abdominal pain, no bleeding or dark stool Musculo: Arthritis, some neck pain Neuro: + paraesthesia in feet, Denies TIA or CVA sx, sz, Skin:  negative CARDIAC EVALUATION  
ECHO (2/13/17): EF 10% severe GHK, PSM. Dil LA. Mod MR. Mild to mod TR. Left pleural effusion ECHO (1/19/17): EF 5-10% with severe GHK,. Mildly dil LA. Mild TR. PASP 46. ECHO: (5/15/17) EF 25%, severe GHK, basal-mid inferior AK, severe LAE, Mod MR, Mild TR, CATH (1/20/17): LM ost70. LAD m50. D1 80. LCx d70; OM1 99, OM2 90. RCA p100. No AVG. PAP 53/27/36. --- s/p PCI (2/9/17): pRCA 2.5x38 Xience + 2.75x12 Xience. ostLM 4.0x12 Xience post-dil with 4.5x12 NC. ICD (6/12/17, Anup): [employer] single lead CARDIAC MRI 1/31/17:LVEF 10%, LVH, RV dilated RVEF 25% GHK, marked LAE, mod CB, mild MR, mod-severe TR, The entire LAD territory : largely viable and should recover upon 
revascularization. The entire RCA territory is largely viable and should recover 
upon revascularization. The LCx territory demonstrate medium-size infarct with limited viability. Large left-sided pleural effusion with passive atelectasis of the left lung. RICKY (3/20/17): R: 0.58, L: 0.56 EKG 5/3/18: SR,  ms, QTc 449 ms History: 
Past Medical History:  
Diagnosis Date  Arthritis   
 hands/fingers  CAD (coronary artery disease)   
 involving native coronary artery of native heart without angina pectoris  Cardiomyopathy ([facility] 01/20/2017 A. Echo (1/19/17):  EF 5-10% with severe GHK,. Mildly dil LA. Mild TR. PASP 46./systemic cardiomyopathy  Chronic renal insufficiency 03/06/2017  Diabetes mellitus ([facility] 3/6/2017 IDDM  Dyslipidemia 3/6/2017 A. FLP (1/19/17): Tot 120, , HDL 19, LDL 76 (no Rx).  H/O: GI bleed 3/6/2017  Hepatitis C 3/6/2017  Ill-defined condition   
 hypokalemia  Ill-defined condition   
 flu 1/2018  Paroxysmal atrial fibrillation ([facility] 3/6/2017  [facility] 9/18/2017 A. RICKY (3/20/17): Right 0.58, Left 0.56.  Vitamin D deficiency 6/4/2018 Past Surgical History:  
Procedure Laterality Date  COLONOSCOPY N/A 2/17/2017 COLONOSCOPY performed by Hammad Collazo. Margarita Bruno MD at [facility] ENDOSCOPY  COLONOSCOPY N/A 2/5/2018 COLONOSCOPY performed by Hammad Collazo. Margarita Bruno MD at [facility] ENDOSCOPY  
 HX COLONOSCOPY [facility] 2/2017  HX GI    
 colonoscopy x 3 - last 2/2017 - polyp  HX HEART CATHETERIZATION    
 3 heart stents  HX OTHER SURGICAL  05/2017 AICD  HX PACEMAKER    
 AICD Social History Social History  Marital status:  Spouse name: N/A  
 Number of children: N/A  
 Years of education: N/A Occupational History  Not on file. Social History Main Topics  Smoking status: Current Every Day Smoker Packs/day: 1.00 Years: 45.00 Last attempt to quit: 1/20/2017  Smokeless tobacco: Never Used  Alcohol use No  
 Drug use: Yes Special: Marijuana  Sexual activity: No  
 
Other Topics Concern  Not on file Social History Narrative Family History Problem Relation Age of Onset  Hypertension Mother  Heart Disease Mother MURMUR  
 Cancer Father COLON  
 Colon Cancer Father  Other Sister   
  born totally handicapped/epileptic  Kidney Disease Sister   
   from renal shutdown  Heart Disease Brother  Other Brother ?pancreatic cancer or ?pancreatitis  Cancer Maternal Uncle   
  toes and spread  Cancer Paternal Uncle   
  stomach  
 Heart Attack Maternal Grandfather 47  Cancer Paternal Grandfather   
  bone  Cancer Maternal Uncle   
  stomach  Cancer Maternal Uncle   
  lung  Anesth Problems Neg Hx Current Medications:  
Current Outpatient Prescriptions Medication Sig Dispense Refill  atorvastatin (LIPITOR) 40 mg tablet TAKE ONE TABLET BY MOUTH ONCE DAILY  Indications: hyperlipidemia 90 Tab 1  cholecalciferol (VITAMIN D3) 1,000 unit tablet Take 2 Tabs by mouth daily.  metoprolol succinate (TOPROL XL) 50 mg XL tablet Take 1 Tab by mouth daily for 360 days. Indications: chronic heart failure 30 Tab 3  
 albuterol (PROVENTIL HFA, VENTOLIN HFA, PROAIR HFA) 90 mcg/actuation inhaler Take 1 Puff by inhalation every four (4) hours as needed for Wheezing. Dr Shraddha Peng or Dr. Aquiles Sadler to refill 1 Inhaler 0  
 spironolactone (ALDACTONE) 25 mg tablet Take 1 Tab by mouth daily. 90 Tab 1  clopidogrel (PLAVIX) 75 mg tab Take  by mouth.  aspirin 81 mg chewable tablet Take 81 mg by mouth daily.  DULoxetine (CYMBALTA) 30 mg capsule Take 1 Cap by mouth daily. (Patient taking differently: Take 30 mg by mouth daily (with dinner). ) 90 Cap 3  
 amiodarone (CORDARONE) 200 mg tablet Take 1 Tab by mouth daily. Indications: PREVENTION OF RECURRENT ATRIAL FIBRILLATION 30 Tab 6  
 insulin NPH/insulin regular (NOVOLIN 70/30) 100 unit/mL (70-30) injection 10 Units by SubCUTAneous route Daily (before breakfast).  insulin NPH/insulin regular (NOVOLIN 70/30) 100 unit/mL (70-30) injection 8 Units by SubCUTAneous route Daily (before dinner).  sacubitril-valsartan (ENTRESTO) 97 mg/103 mg tablet Take 1 Tab by mouth two (2) times a day. 180 Tab 4  patiromer calcium sorbitex (VELTASSA) 8.4 gram powder Take 8.4 g by mouth daily. 4 Packet 0  
 bumetanide (BUMEX) 1 mg tablet Take 0.5 mg by mouth as needed (fluid retention).  acetaminophen (TYLENOL EXTRA STRENGTH) 500 mg tablet Take 1,000 mg by mouth every six (6) hours as needed for Pain. Indications: BACK PAIN    
 carvedilol (COREG) 3.125 mg tablet TAKE ONE TABLET BY MOUTH TWICE DAILY WITH MEALS 60 Tab 4  
 Insulin Syringes, Disposable, 1 mL syrg Pt to take 10 units w/ breakfast and 8 units w/ dinner 500 Syringe 1 Allergies: Allergies Allergen Reactions  Heparin (Porcine) Other (comments) Was told when in the hospital that if he received heparin again that it would be deadly. Physical Exam:  
 
Constitutional: A&O x 3, well developed, WM in NAD  -older than his stated age HENT[de-identified] Normocephalic and atraumatic. mucous membranes pink, moist  
Neck: Neck supple. No lymphadenopathy R carotid bruit Cardiovascular: PMI nondisplaced, AICD R infraclavicular space, RRR, S1 & S2, 2/6 systolic murmur at apex, no S3, JVP ~10 cm, mild  HJR. Pulmonary/Chest: Effort normal, rhonchi on the left and end insp wheezing noted. .  
Abdominal: Soft. Not distended, non-tender, + bowel sounds Extremities: no edema, no cyanosis, feet warm , + femoral pulses, + bilateral femoral bruits, difficult to palpate distal pedal pulses Neurological: A&O x 3 nofocal deficits Skin: Skin is warm and dry. Psychiatric: He has a normal mood and affect. Vitals:   
Visit Vitals  /68 (BP 1 Location: Left arm, BP Patient Position: Sitting)  Pulse 60  Temp 97.5 °F (36.4 °C) (Oral)  Resp 20  
 Ht 5' 10\" (1.778 m)  Wt 203 lb (92.1 kg)  SpO2 97%  BMI 29.13 kg/m2 Recent Labs:  
Lab Results Component Value Date/Time WBC 7.8 03/08/2018 02:42 PM  
 HGB 13.8 03/08/2018 02:42 PM  
 HCT 42.0 03/08/2018 02:42 PM  
 PLATELET 600 33/39/5181 02:42 PM  
 MCV 87.0 03/08/2018 02:42 PM  
 
Lab Results Component Value Date/Time TSH 3.040 12/04/2017 10:17 AM  
  
Lab Results Component Value Date/Time BNP 1209 (H) 01/21/2017 12:25 PM  
 NT pro-BNP 6258 (H) 01/31/2017 04:52 AM  
 NT pro-BNP 6094 (H) 01/30/2017 04:16 AM  
 NT pro-BNP 6168 (H) 01/29/2017 04:18 AM  
 NT pro-BNP 6876 (H) 01/28/2017 03:38 AM  
 NT pro-BNP 5427 (H) 01/27/2017 03:56 AM  
  
Lab Results Component Value Date/Time Sodium 142 06/06/2018 10:55 AM  
 Potassium 4.1 06/06/2018 10:55 AM  
 Chloride 102 06/06/2018 10:55 AM  
 CO2 22 06/06/2018 10:55 AM  
 Anion gap 6 03/08/2018 02:42 PM  
 Glucose 151 (H) 06/06/2018 10:55 AM  
 BUN 31 (H) 06/06/2018 10:55 AM  
 Creatinine 1.79 (H) 06/06/2018 10:55 AM  
 BUN/Creatinine ratio 17 06/06/2018 10:55 AM  
 GFR est AA 45 (L) 06/06/2018 10:55 AM  
 GFR est non-AA 39 (L) 06/06/2018 10:55 AM  
 Calcium 9.1 06/06/2018 10:55 AM  
 Bilirubin, total 1.0 06/06/2018 10:55 AM  
 ALT (SGPT) 10 06/06/2018 10:55 AM  
 AST (SGOT) 12 06/06/2018 10:55 AM  
 Alk. phosphatase 59 06/06/2018 10:55 AM  
 Protein, total 6.6 06/06/2018 10:55 AM  
 Albumin 4.2 06/06/2018 10:55 AM  
 Globulin 4.4 (H) 02/22/2017 03:16 AM  
 A-G Ratio 1.8 06/06/2018 10:55 AM  
  
Lab Results Component Value Date/Time Hemoglobin A1c 6.4 (H) 06/06/2018 10:55 AM  
 Hemoglobin A1c (POC) 6.4 08/17/2017 11:00 AM  
  
 
 
I have discussed the diagnosis with  Grady Faith. and the intended plan as seen in the above orders. Questions were answered concerning future plans, and written instructions provided. I have discussed medication side effects and warnings with the patient as well. Thank you for allowing me to participate in the care of this delightful gentleman. Please do not hesitate to call with any questions. Opal Boyd  RN, ACNP-BC, [facility] Jenniferzabrina Roman [address] [id number] 
[phone] 
89 hour VAD/HF Pager: [phone] If you have questions, please do not hesitate to call me. I look forward to following Mr. Obdulio Quinonez along with you. Sincerely, PATRICE NavaP

## 2018-07-06 LAB
BUN SERPL-MCNC: 29 MG/DL (ref 8–27)
BUN/CREAT SERPL: 18 (ref 10–24)
CALCIUM SERPL-MCNC: 9.5 MG/DL (ref 8.6–10.2)
CHLORIDE SERPL-SCNC: 105 MMOL/L (ref 96–106)
CO2 SERPL-SCNC: 23 MMOL/L (ref 20–29)
CREAT SERPL-MCNC: 1.6 MG/DL (ref 0.76–1.27)
GLUCOSE SERPL-MCNC: 93 MG/DL (ref 65–99)
NT-PROBNP SERPL-MCNC: 1907 PG/ML (ref 0–376)
POTASSIUM SERPL-SCNC: 4.4 MMOL/L (ref 3.5–5.2)
SODIUM SERPL-SCNC: 143 MMOL/L (ref 134–144)

## 2018-07-09 ENCOUNTER — TELEPHONE (OUTPATIENT)
Dept: CARDIOLOGY CLINIC | Age: 68
End: 2018-07-09

## 2018-07-09 NOTE — TELEPHONE ENCOUNTER
----- Message from Anastacio Shone, ACNP sent at 7/9/2018 11:53 AM EDT -----  Labs stable  Continue Current plan  Thank you    Left message for patient to return call with message as noted above. Patient returned call, I reviewed all labs, he states understanding and has no further questions.

## 2018-07-09 NOTE — TELEPHONE ENCOUNTER
----- Message from JENI Covington sent at 7/5/2018 11:58 AM EDT -----  Hello,   I had ordered an echo at his last visit and he had not completed it, so please follow up- to be done at [facility]    thank you     Echo scheduled for Wednesday 7/11/18 at 2 pm at [facility]. I notified patient's wife who will notify patient.

## 2018-07-11 ENCOUNTER — CLINICAL SUPPORT (OUTPATIENT)
Dept: CARDIOLOGY CLINIC | Age: 68
End: 2018-07-11

## 2018-07-11 DIAGNOSIS — I25.10 CORONARY ARTERY DISEASE INVOLVING NATIVE CORONARY ARTERY OF NATIVE HEART WITHOUT ANGINA PECTORIS: ICD-10-CM

## 2018-07-11 DIAGNOSIS — I73.9 PERIPHERAL VASCULAR DISEASE (HCC): ICD-10-CM

## 2018-07-11 DIAGNOSIS — Z95.810 ICD (IMPLANTABLE CARDIOVERTER-DEFIBRILLATOR) IN PLACE: ICD-10-CM

## 2018-07-11 DIAGNOSIS — I42.9 CARDIOMYOPATHY, UNSPECIFIED TYPE (HCC): Primary | ICD-10-CM

## 2018-07-11 DIAGNOSIS — E78.5 DYSLIPIDEMIA: ICD-10-CM

## 2018-07-11 DIAGNOSIS — I25.5 ISCHEMIC CARDIOMYOPATHY: ICD-10-CM

## 2018-07-11 DIAGNOSIS — I48.0 PAROXYSMAL ATRIAL FIBRILLATION (HCC): ICD-10-CM

## 2018-07-11 DIAGNOSIS — I50.22 SYSTOLIC CHF, CHRONIC (HCC): ICD-10-CM

## 2018-07-11 DIAGNOSIS — E87.6 HYPOKALEMIA: ICD-10-CM

## 2018-07-13 ENCOUNTER — TELEPHONE (OUTPATIENT)
Dept: CARDIOLOGY CLINIC | Age: 68
End: 2018-07-13

## 2018-07-13 NOTE — TELEPHONE ENCOUNTER
----- Message from Nghia Sutton MD sent at 7/12/2018 11:21 PM EDT -----  Regarding: please call patient  Please call patient. Echo 7/11/18 - LV mildly dilated. LVEF 25%. Akinesis of the basal-mid inferior wall(s). Grade 1 diastolic dysfunction. Mild-mod LAE. Mild MR. PASP 42. Sinus of valsalva was 4.3 cm, STJ was 3.4 and ascending aorta was 3.6 cm. Echo with stable findings. Echo similar to prior echo 2017. Will have my nurse call.      Thanks,  SK

## 2018-07-25 ENCOUNTER — OFFICE VISIT (OUTPATIENT)
Dept: CARDIOLOGY CLINIC | Age: 68
End: 2018-07-25

## 2018-07-25 ENCOUNTER — CLINICAL SUPPORT (OUTPATIENT)
Dept: CARDIOLOGY CLINIC | Age: 68
End: 2018-07-25

## 2018-07-25 VITALS
BODY MASS INDEX: 29.15 KG/M2 | SYSTOLIC BLOOD PRESSURE: 160 MMHG | RESPIRATION RATE: 16 BRPM | HEIGHT: 70 IN | DIASTOLIC BLOOD PRESSURE: 92 MMHG | OXYGEN SATURATION: 96 % | WEIGHT: 203.6 LBS | HEART RATE: 80 BPM

## 2018-07-25 DIAGNOSIS — Z95.810 ICD (IMPLANTABLE CARDIOVERTER-DEFIBRILLATOR) IN PLACE: Primary | ICD-10-CM

## 2018-07-25 DIAGNOSIS — I48.0 PAROXYSMAL ATRIAL FIBRILLATION (HCC): Primary | ICD-10-CM

## 2018-07-25 DIAGNOSIS — Z95.810 ICD (IMPLANTABLE CARDIOVERTER-DEFIBRILLATOR) IN PLACE: ICD-10-CM

## 2018-07-25 DIAGNOSIS — I25.10 CORONARY ARTERY DISEASE INVOLVING NATIVE CORONARY ARTERY OF NATIVE HEART WITHOUT ANGINA PECTORIS: ICD-10-CM

## 2018-07-25 DIAGNOSIS — I25.5 ISCHEMIC CARDIOMYOPATHY: ICD-10-CM

## 2018-07-25 NOTE — PROGRESS NOTES
Visit Vitals    BP (!) 160/92 (BP 1 Location: Left arm, BP Patient Position: Sitting)    Pulse 80    Resp 16    Ht 5' 10\" (1.778 m)    Wt 203 lb 9.6 oz (92.4 kg)    SpO2 96%    BMI 29.21 kg/m2     Medication changes made  per verbal order of Dr. Pearley Burkitt

## 2018-07-25 NOTE — PROGRESS NOTES
HISTORY OF PRESENTING ILLNESS      Fernanda Cuellar is a 74 y.o. male with ICM, LVEF 25%, CKD, PAF (Hx GIB), DM, Hepatitis C, CAD s/p  PCI, ICD here for follow up. He remains on amiodarone for AF. Creatinine earlier this month was 1.6. Device interrogation demonstrates normal functioning and he denies cardiac complaints at this time. ACTIVE PROBLEM LIST     Patient Active Problem List    Diagnosis Date Noted    Proteinuria 06/06/2018    Vitamin D deficiency 06/04/2018    Polyp of colon 03/05/2018    Advance care planning 03/05/2018    Type 2 diabetes mellitus with nephropathy ([facility] 01/11/2018    Hypokalemia 01/11/2018    ICD (implantable cardioverter-defibrillator) in place 12/04/2017    Peripheral vascular disease ([facility] 09/18/2017    Coronary artery disease involving native heart without angina pectoris 75/14/9162    Systolic CHF, chronic ([address] 08/17/2017    Type 2 diabetes mellitus with complication ([facility] 34/23/9186    Paroxysmal atrial fibrillation ([facility] 03/06/2017    H/O: GI bleed 03/06/2017    Hepatitis C 03/06/2017    Chronic renal insufficiency 03/06/2017    Dyslipidemia 03/06/2017    Ischemic cardiomyopathy 01/20/2017           PAST MEDICAL HISTORY     Past Medical History:   Diagnosis Date    Arthritis     hands/fingers    CAD (coronary artery disease)     involving native coronary artery of native heart without angina pectoris    Cardiomyopathy ([address] 01/20/2017    A. Echo (1/19/17):  EF 5-10% with severe GHK,. Mildly dil LA. Mild TR. PASP 46./systemic cardiomyopathy    Chronic renal insufficiency 03/06/2017    Diabetes mellitus ([address] 3/6/2017    IDDM    Dyslipidemia 3/6/2017    A. FLP (1/19/17): Tot 120, , HDL 19, LDL 76 (no Rx).     H/O: GI bleed 3/6/2017    Hepatitis C 3/6/2017    Ill-defined condition     hypokalemia    Ill-defined condition     flu 1/2018     Paroxysmal atrial fibrillation ([address] 3/6/2017    Peripheral vascular disease ([address] 2017    A. RICKY (3/20/17): Right 0.58, Left 0.56.  Vitamin D deficiency 2018           PAST SURGICAL HISTORY     Past Surgical History:   Procedure Laterality Date    COLONOSCOPY N/A 2017    COLONOSCOPY performed by [facility]. Suad Chavira MD at [address] [place] [address] COLONOSCOPY N/A 2018    COLONOSCOPY performed by [facility]. Suad Chavira MD at [address] [place] [address] HX COLONOSCOPY      [facility] 2017    HX GI      colonoscopy x 3 - last 2017 - polyp    HX HEART CATHETERIZATION      3 heart stents    HX OTHER SURGICAL  2017    AICD    HX PACEMAKER      AICD          ALLERGIES     Allergies   Allergen Reactions    Heparin (Porcine) Other (comments)     Was told when in the hospital that if he received heparin again that it would be deadly.           FAMILY HISTORY     Family History   Problem Relation Age of Onset    Hypertension Mother     Heart Disease Mother      MURMUR    Cancer Father      COLON    Colon Cancer Father     Other Sister      born totally handicapped/epileptic    Kidney Disease Sister       from renal shutdown    Heart Disease Brother     Other Brother      ?pancreatic cancer or ?pancreatitis    Cancer Maternal Uncle      toes and spread    Cancer Paternal Uncle      stomach    Heart Attack Maternal Grandfather 47    Cancer Paternal Grandfather      bone    Cancer Maternal Uncle      stomach    Cancer Maternal Uncle      lung    Anesth Problems Neg Hx     negative for cardiac disease       SOCIAL HISTORY     Social History     Social History    Marital status:      Spouse name: N/A    Number of children: N/A    Years of education: N/A     Social History Main Topics    Smoking status: Current Every Day Smoker     Packs/day: 1.00     Years: 45.00     Last attempt to quit: 2017    Smokeless tobacco: Never Used    Alcohol use No    Drug use: Yes     Special: Marijuana    Sexual activity: No     Other Topics Concern    Not on file     Social History Narrative         MEDICATIONS     Current Outpatient Prescriptions   Medication Sig    nicotine (NICODERM CQ) 21 mg/24 hr 1 Patch by TransDERmal route every twenty-four (24) hours for 30 days. Tapering dose  Indications: Smoking Cessation    atorvastatin (LIPITOR) 40 mg tablet TAKE ONE TABLET BY MOUTH ONCE DAILY  Indications: hyperlipidemia    cholecalciferol (VITAMIN D3) 1,000 unit tablet Take 2 Tabs by mouth daily.  carvedilol (COREG) 3.125 mg tablet TAKE ONE TABLET BY MOUTH TWICE DAILY WITH MEALS    metoprolol succinate (TOPROL XL) 50 mg XL tablet Take 1 Tab by mouth daily for 360 days. Indications: chronic heart failure    albuterol (PROVENTIL HFA, VENTOLIN HFA, PROAIR HFA) 90 mcg/actuation inhaler Take 1 Puff by inhalation every four (4) hours as needed for Wheezing. Dr Edward Tong or Dr. Pablo Blanchard to refill    spironolactone (ALDACTONE) 25 mg tablet Take 1 Tab by mouth daily.  clopidogrel (PLAVIX) 75 mg tab Take  by mouth.  aspirin 81 mg chewable tablet Take 81 mg by mouth daily.  DULoxetine (CYMBALTA) 30 mg capsule Take 1 Cap by mouth daily. (Patient taking differently: Take 30 mg by mouth daily (with dinner). )    amiodarone (CORDARONE) 200 mg tablet Take 1 Tab by mouth daily. Indications: PREVENTION OF RECURRENT ATRIAL FIBRILLATION    insulin NPH/insulin regular (NOVOLIN 70/30) 100 unit/mL (70-30) injection 10 Units by SubCUTAneous route Daily (before breakfast).  insulin NPH/insulin regular (NOVOLIN 70/30) 100 unit/mL (70-30) injection 8 Units by SubCUTAneous route Daily (before dinner).  sacubitril-valsartan (ENTRESTO) 97 mg/103 mg tablet Take 1 Tab by mouth two (2) times a day.  patiromer calcium sorbitex (VELTASSA) 8.4 gram powder Take 8.4 g by mouth daily.  bumetanide (BUMEX) 1 mg tablet Take 0.5 mg by mouth as needed (fluid retention).  acetaminophen (TYLENOL EXTRA STRENGTH) 500 mg tablet Take 1,000 mg by mouth every six (6) hours as needed for Pain.  Indications: BACK PAIN    Insulin Syringes, Disposable, 1 mL syrg Pt to take 10 units w/ breakfast and 8 units w/ dinner     No current facility-administered medications for this visit. I have reviewed the nurses notes, vitals, problem list, allergy list, medical history, family, social history and medications. REVIEW OF SYMPTOMS      General: Pt denies excessive weight gain or loss. Pt is able to conduct ADL's  HEENT: Denies blurred vision, headaches, hearing loss, epistaxis and difficulty swallowing. Respiratory: Denies cough, congestion, shortness of breath, NAVARRO, wheezing or stridor. Cardiovascular: Denies precordial pain, palpitations, edema or PND  Gastrointestinal: Denies poor appetite, indigestion, abdominal pain or blood in stool  Genitourinary: Denies hematuria, dysuria, increased urinary frequency  Musculoskeletal: Denies joint pain or swelling from muscles or joints  Neurologic: Denies tremor, paresthesias, headache, or sensory motor disturbance  Psychiatric: Denies confusion, insomnia, depression  Integumentray: Denies rash, itching or ulcers. Hematologic: Denies easy bruising, bleeding       PHYSICAL EXAMINATION      There were no vitals filed for this visit. General: Well developed, in no acute distress. HEENT: No jaundice, oral mucosa moist, no oral ulcers  Neck: Supple, no stiffness, no lymphadenopathy, supple  Heart:  Normal S1/S2 negative S3 or S4. Regular, no murmur, gallop or rub, no jugular venous distention  Respiratory: Clear bilaterally x 4, no wheezing or rales  Abdomen:   Soft, non-tender, bowel sounds are active.   Extremities:  No edema, normal cap refill, no cyanosis. Musculoskeletal: No clubbing, no deformities  Neuro: A&Ox3, speech clear, gait stable, cooperative, no focal neurologic deficits  Skin: Skin color is normal. No rashes or lesions.  Non diaphoretic, moist.  Vascular: 2+ pulses symmetric in all extremities       DIAGNOSTIC DATA      EKG: sinus rhythm        LABORATORY DATA      Lab Results   Component Value Date/Time    WBC 7.8 03/08/2018 02:42 PM    HGB 13.8 03/08/2018 02:42 PM    HCT 42.0 03/08/2018 02:42 PM    PLATELET 953 92/46/0116 02:42 PM    MCV 87.0 03/08/2018 02:42 PM      Lab Results   Component Value Date/Time    Sodium 143 07/05/2018 12:00 AM    Potassium 4.4 07/05/2018 12:00 AM    Chloride 105 07/05/2018 12:00 AM    CO2 23 07/05/2018 12:00 AM    Anion gap 6 03/08/2018 02:42 PM    Glucose 93 07/05/2018 12:00 AM    BUN 29 (H) 07/05/2018 12:00 AM    Creatinine 1.60 (H) 07/05/2018 12:00 AM    BUN/Creatinine ratio 18 07/05/2018 12:00 AM    GFR est AA 51 (L) 07/05/2018 12:00 AM    GFR est non-AA 45 (L) 07/05/2018 12:00 AM    Calcium 9.5 07/05/2018 12:00 AM    Bilirubin, total 1.0 06/06/2018 10:55 AM    AST (SGOT) 12 06/06/2018 10:55 AM    Alk. phosphatase 59 06/06/2018 10:55 AM    Protein, total 6.6 06/06/2018 10:55 AM    Albumin 4.2 06/06/2018 10:55 AM    Globulin 4.4 (H) 02/22/2017 03:16 AM    A-G Ratio 1.8 06/06/2018 10:55 AM    ALT (SGPT) 10 06/06/2018 10:55 AM           ASSESSMENT      1. Cardiomyopathy                          A. Ischemic                        B. NYHA class III  2. Atrial fibrillation                        A. Paroxysmal  3. CAD, native  4. Percutaneous transluminal angioplasty  5. ICD                         A. [employer]                        B. Single chamber          PLAN     Continue device checks. FOLLOW-UP       Thank you, Brayden Ulloa MD for allowing me to participate in the care of this extraordinarily pleasant male. Please do not hesitate to contact me for further questions/concerns.      MONICA French MD  Cardiac Electrophysiology / Cardiology    [facility]. [address] [facility], [address]  [phone] / [phone] Fax   [phone] / [phone] Fax

## 2018-09-06 ENCOUNTER — HOSPITAL ENCOUNTER (OUTPATIENT)
Dept: LAB | Age: 68
Discharge: HOME | End: 2018-09-06
Payer: MEDICARE

## 2018-09-06 ENCOUNTER — OFFICE VISIT (OUTPATIENT)
Dept: FAMILY MEDICINE CLINIC | Age: 68
End: 2018-09-06

## 2018-09-06 VITALS
DIASTOLIC BLOOD PRESSURE: 77 MMHG | BODY MASS INDEX: 29.18 KG/M2 | RESPIRATION RATE: 16 BRPM | HEIGHT: 70 IN | WEIGHT: 203.8 LBS | HEART RATE: 83 BPM | TEMPERATURE: 97.8 F | OXYGEN SATURATION: 92 % | SYSTOLIC BLOOD PRESSURE: 135 MMHG

## 2018-09-06 DIAGNOSIS — R80.9 PROTEINURIA, UNSPECIFIED TYPE: ICD-10-CM

## 2018-09-06 DIAGNOSIS — I50.22 SYSTOLIC CHF, CHRONIC (HCC): ICD-10-CM

## 2018-09-06 DIAGNOSIS — E11.21 TYPE 2 DIABETES MELLITUS WITH NEPHROPATHY (HCC): Primary | ICD-10-CM

## 2018-09-06 DIAGNOSIS — E78.5 DYSLIPIDEMIA: ICD-10-CM

## 2018-09-06 PROCEDURE — 80053 COMPREHEN METABOLIC PANEL: CPT

## 2018-09-06 PROCEDURE — 83036 HEMOGLOBIN GLYCOSYLATED A1C: CPT

## 2018-09-06 PROCEDURE — 80061 LIPID PANEL: CPT

## 2018-09-06 NOTE — PROGRESS NOTES
Chief Complaint   Patient presents with    CHF     Stopped all meds    Diabetes    Labs     fasting today-except trail mix     1. Have you been to the ER, urgent care clinic since your last visit? Hospitalized since your last visit? No    2. Have you seen or consulted any other health care providers outside of the [address] [place] [address] since your last visit? Include any pap smears or colon screening. No       Alonso Goldman.  9/6/2018  Provider:   Danny:  Diabetes Report Card   1) Have you seen the eye doctor in past year?yes    2) How would you  rate your Diabetic Diet? Stable   3) How well do you take care of your feet? Self care   4) Do you keep your Primary Care Follow Up Appts? yes    5) Do you know your A1C goal?yes    6) Do you take your medications daily?no    7) Do you check your blood sugars?no    8) Have you gained weight?no       9) Do you follow an exercise program?no    10) Can you do better?yes      Lab Results   Component Value Date/Time    Cholesterol, total 225 (H) 06/06/2018 10:55 AM    HDL Cholesterol 34 (L) 06/06/2018 10:55 AM    LDL, calculated 156 (H) 06/06/2018 10:55 AM    Triglyceride 176 (H) 06/06/2018 10:55 AM    CHOL/HDL Ratio 6.3 (H) 01/19/2017 03:50 AM     Lab Results   Component Value Date/Time    Hemoglobin A1c 6.4 (H) 06/06/2018 10:55 AM    Hemoglobin A1c 6.4 (H) 03/05/2018 10:07 AM    Hemoglobin A1c 6.2 (H) 12/04/2017 10:17 AM    Glucose 93 07/05/2018 12:00 AM    Glucose (POC) 146 (H) 03/13/2018 12:11 PM    Microalb/Creat ratio (ug/mg creat.) 106.5 (H) 12/04/2017 10:17 AM    LDL, calculated 156 (H) 06/06/2018 10:55 AM    Creatinine 1.60 (H) 07/05/2018 12:00 AM          Lab Results   Component Value Date/Time    Microalb/Creat ratio (ug/mg creat.) 106.5 (H) 12/04/2017 10:17 AM      Chief Complaint   Patient presents with    CHF     Stopped all meds    Diabetes    Labs     fasting today-except trail mix     he is a 74y.o. year old male who presents for evaluation. See Diabetic Report Card listed above. Patient Active Problem List    Diagnosis    Proteinuria    Vitamin D deficiency    Polyp of colon    Advance care planning    Type 2 diabetes mellitus with nephropathy [facility]    Hypokalemia    ICD (implantable cardioverter-defibrillator) in place    Peripheral vascular disease ([facility]     A. RICKY (3/20/17): Right 0.58, Left 0.56.  Coronary artery disease involving native heart without angina pectoris    Systolic CHF, chronic (HCC)    Type 2 diabetes mellitus with complication (HCC)    Paroxysmal atrial fibrillation (HCC)    H/O: GI bleed    Hepatitis C    Chronic renal insufficiency    Dyslipidemia     A. FLP (1/19/17): Tot 120, , HDL 19, LDL 76 (no Rx).  Ischemic cardiomyopathy     A.  Echo (1/19/17):  EF 5-10% with severe GHK,. Mildly dil LA. Mild TR. PASP 46. B. Cath (1/20/17):  LM ost70. LAD m50. D1 80. LCx d70; OM1 99, OM2 90. RCA p100. No AVG. PAP  53/27/36. C.  PCI (2/9/17): pRCA 2.5x38 Xience + 2.75x12 Xience. ostLM 4.0x12 Xience post-dil with 4.5x12 NC. D.  Echo (2/13/17):  EF 10% with severe GHK, PSM. Dil LA. Mod MR. Mild to mod TR. Left pleural effusion. E.  Echo (5/15/17): EF 25% with severe GHK and basl-mid inf AK, mildly dil LV, DD. Sev dil LA. Mod MR. Mild TR. PASP 35. F.  ICD (6/12/17, Anup): [employer]. Reviewed PmHx, RxHx, FmHx, SocHx, AllgHx--dated and updated in the chart.     Review of Systems - negative except as listed above in the HPI    Objective:     Vitals:    09/06/18 1410   BP: 135/77   Pulse: 83   Resp: 16   Temp: 97.8 °F (36.6 °C)   TempSrc: Oral   SpO2: 92%   Weight: 203 lb 12.8 oz (92.4 kg)   Height: 5' 10\" (1.778 m)     Physical Examination: General appearance - alert, well appearing, and in no distress  Chest - clear to auscultation, no wheezes, rales or rhonchi, symmetric air entry  Heart - normal rate, regular rhythm, normal S1, S2, no murmurs, rubs, clicks or gallops  Abdomen - soft, nontender, nondistended, no masses or organomegaly    Assessment/ Plan:   Diagnoses and all orders for this visit:    1. Type 2 diabetes mellitus with nephropathy (HCC)  -     LIPID PANEL  -     METABOLIC PANEL, COMPREHENSIVE  -     HEMOGLOBIN A1C WITH EAG   -at goal    2. Dyslipidemia  -     LIPID PANEL  -     METABOLIC PANEL, COMPREHENSIVE    3. Proteinuria, unspecified type  -     METABOLIC PANEL, COMPREHENSIVE    4. Systolic CHF, chronic (HCC)  -     METABOLIC PANEL, COMPREHENSIVE  -off all rx due to cost  -seeing cardio this month  -told pt to keep bumex and take with inc wt and edema       Follow-up Disposition:  Return in about 3 months (around 12/6/2018) for dm. Lab Results   Component Value Date/Time    Cholesterol, total 225 (H) 06/06/2018 10:55 AM    HDL Cholesterol 34 (L) 06/06/2018 10:55 AM    LDL, calculated 156 (H) 06/06/2018 10:55 AM    Triglyceride 176 (H) 06/06/2018 10:55 AM    CHOL/HDL Ratio 6.3 (H) 01/19/2017 03:50 AM     Lab Results   Component Value Date/Time    Hemoglobin A1c 6.4 (H) 06/06/2018 10:55 AM    Hemoglobin A1c 6.4 (H) 03/05/2018 10:07 AM    Hemoglobin A1c 6.2 (H) 12/04/2017 10:17 AM    Microalb/Creat ratio (ug/mg creat.) 106.5 (H) 12/04/2017 10:17 AM    LDL, calculated 156 (H) 06/06/2018 10:55 AM    Creatinine 1.60 (H) 07/05/2018 12:00 AM          Discussed with patient goal of Diabetes to include:  HgA1C <7, LDL cholesterol <100, Blood pressure <140/80. Discussed with patient diet and weight management and to get regular exercise. Recommend yearly eye exams and daily foot care. The patient understands and agrees with the plan. I have discussed the diagnosis with the patient and the intended plan as seen in the above orders. The patient has received an after-visit summary and questions were answered concerning future plans.      Medication Side Effects and Warnings were discussed with patient  Patient Labs were reviewed and or requested  Patient Past Records were reviewed and or requested    Faraz Kent M.D. [facility] [facility]    There are no Patient Instructions on file for this visit.

## 2018-09-06 NOTE — MR AVS SNAPSHOT
[address] [address] [facility] [address] 
[phone] Patient: Luciana Stoner. MRN: [id number] Visit Information Date & Time Provider Department Dept. Phone Encounter #  
 9/6/2018  2:00 PM Nasim Pablo MD [address] [facility] [phone] [id number] Follow-up Instructions Return in about 3 months (around 12/6/2018) for dm. Your Appointments 9/11/2018 12:30 PM  
Follow Up with Anastacio Shone, [facility] [address] [facility] Appt Note: HF follow up [address] [facility], [address], [address] [place] [address]  
  
    
 9/27/2018 10:20 AM  
ESTABLISHED PATIENT with Layne Moore MD  
[facility] ([facility] SCHEDULING) Appt Note: 6 mo fu  
 [address] [facility] [address]  
[phone]  
  
   
 [address] [facility] [address] [place] [address]  
  
    
 6/5/2019 10:15 AM  
PACEMAKER with PACEMAKERTAYANCES  
[facility] ([facility] SCHEDULING) Appt Note: Yuliet sci ICD   STUDY   DONT MOVE CALL CECIL [phone]  
 [address] [facility] [address] [address]  
[phone]  
  
   
 [address] [facility] [address] [place] [phone]  
  
    
  
 11/8/2018  1:00 PM  
REMOTE OFFICE VISIT with Moises Reid [facility] ([facility] SCHEDULING) Appt Note: Lat ICD   study [address] [facility]  
[address] [facility] [place] [address] [address] Upcoming Health Maintenance Date Due  
 FOOT EXAM Q1 11/23/1962 DTaP/Tdap/Td series (1 - Tdap) 11/23/1973 ZOSTER VACCINE AGE 60> 9/23/2012 GLAUCOMA SCREENING Q2Y 11/23/2017 COLONOSCOPY 2/5/2019 Influenza Age 5 to Adult 3/31/2019* Pneumococcal 65+ Low/Medium Risk (2 of 2 - PPSV23) 11/15/2018 MICROALBUMIN Q1 12/4/2018 HEMOGLOBIN A1C Q6M 12/6/2018 MEDICARE YEARLY EXAM 3/6/2019 EYE EXAM RETINAL OR DILATED Q1 6/6/2019 LIPID PANEL Q1 6/6/2019 *Topic was postponed. The date shown is not the original due date. Allergies as of 9/6/2018  Review Complete On: 9/6/2018 By: Jose Dior MD  
  
 Severity Noted Reaction Type Reactions Heparin (Porcine) High 02/07/2017    Other (comments) Was told when in the hospital that if he received heparin again that it would be deadly. Current Immunizations  Reviewed on 12/4/2017 Name Date Influenza Nasal Vaccine 11/15/2017 Pneumococcal Conjugate (PCV-13) 11/15/2017 Not reviewed this visit You Were Diagnosed With   
  
 Codes Comments Type 2 diabetes mellitus with nephropathy ([facility]    -  Primary ICD-10-CM: E11.21 
ICD-9-CM: 250.40, 583.81 Dyslipidemia     ICD-10-CM: E78.5 ICD-9-CM: 272.4 Proteinuria, unspecified type     ICD-10-CM: R80.9 ICD-9-CM: 791.0 Systolic CHF, chronic (HCC)     ICD-10-CM: I50.22 ICD-9-CM: 428.22, 428.0 Vitals BP Pulse Temp Resp Height(growth percentile) Weight(growth percentile) 135/77 83 97.8 °F (36.6 °C) (Oral) 16 5' 10\" (1.778 m) 203 lb 12.8 oz (92.4 kg) SpO2 BMI Smoking Status 92% 29.24 kg/m2 Current Every Day Smoker Vitals History BMI and BSA Data Body Mass Index Body Surface Area  
 29.24 kg/m 2 2.14 m 2 Preferred Pharmacy Pharmacy Name Phone [facility] [phone] Your Updated Medication List  
  
   
This list is accurate as of 9/6/18  2:28 PM.  Always use your most recent med list.  
  
  
  
  
 albuterol 90 mcg/actuation inhaler Commonly known as:  PROVENTIL HFA, VENTOLIN HFA, PROAIR HFA Take 1 Puff by inhalation every four (4) hours as needed for Wheezing. Dr Myron Miranda or Dr. Alicia Goodman to refill amiodarone 200 mg tablet Commonly known as:  CORDARONE Take 1 Tab by mouth daily. Indications: PREVENTION OF RECURRENT ATRIAL FIBRILLATION  
  
 aspirin 81 mg chewable tablet Take 81 mg by mouth daily. atorvastatin 40 mg tablet Commonly known as:  LIPITOR  
TAKE ONE TABLET BY MOUTH ONCE DAILY  Indications: hyperlipidemia  
  
 bumetanide 1 mg tablet Commonly known as:  Ruby Hodgson Take 0.5 mg by mouth as needed (fluid retention). DULoxetine 30 mg capsule Commonly known as:  CYMBALTA Take 1 Cap by mouth daily. Insulin Syringes (Disposable) 1 mL Syrg Pt to take 10 units w/ breakfast and 8 units w/ dinner  
  
 metoprolol succinate 50 mg XL tablet Commonly known as:  TOPROL XL Take 1 Tab by mouth daily for 360 days. Indications: chronic heart failure * NovoLIN 70/30 U-100 Insulin 100 unit/mL (70-30) injection Generic drug:  insulin NPH/insulin regular 10 Units by SubCUTAneous route Daily (before breakfast). * NovoLIN 70/30 U-100 Insulin 100 unit/mL (70-30) injection Generic drug:  insulin NPH/insulin regular  
8 Units by SubCUTAneous route Daily (before dinner). patiromer calcium sorbitex 8.4 gram powder Commonly known as:  VELTASSA Take 8.4 g by mouth daily. PLAVIX 75 mg Tab Generic drug:  clopidogrel Take  by mouth. sacubitril-valsartan  mg tablet Commonly known as:  ENTRESTO Take 1 Tab by mouth two (2) times a day. spironolactone 25 mg tablet Commonly known as:  ALDACTONE Take 1 Tab by mouth daily. TYLENOL EXTRA STRENGTH 500 mg tablet Generic drug:  acetaminophen Take 1,000 mg by mouth every six (6) hours as needed for Pain. Indications: BACK PAIN  
  
 VITAMIN D3 1,000 unit tablet Generic drug:  cholecalciferol Take 2 Tabs by mouth daily. * Notice: This list has 2 medication(s) that are the same as other medications prescribed for you.  Read the directions carefully, and ask your doctor or other care provider to review them with you. We Performed the Following HEMOGLOBIN A1C WITH EAG [81677 CPT(R)] LIPID PANEL [89385 CPT(R)] METABOLIC PANEL, COMPREHENSIVE [18420 CPT(R)] Follow-up Instructions Return in about 3 months (around 12/6/2018) for dm. Introducing [facility] & HE[facility]! [facility] introduces [employer] patient portal. Now you can access parts of your medical record, email your doctor's office, and request medication refills online. 1. In your internet browser, go to [email or web address] 2. Click on the First Time User? Click Here link in the Sign In box. You will see the New Member Sign Up page. 3. Enter your [employer] Access Code exactly as it appears below. You will not need to use this code after youve completed the sign-up process. If you do not sign up before the expiration date, you must request a new code. · [employer] Access Code: [email or web address] Expires: 12/5/2018  2:28 PM 
 
4. Enter the last four digits of your Social Security Number (xxxx) and Date of Birth (mm/dd/yyyy) as indicated and click Submit. You will be taken to the next sign-up page. 5. Create a [employer] ID. This will be your [employer] login ID and cannot be changed, so think of one that is secure and easy to remember. 6. Create a [employer] password. You can change your password at any time. 7. Enter your Password Reset Question and Answer. This can be used at a later time if you forget your password. 8. Enter your e-mail address. You will receive e-mail notification when new information is available in [address] 9. Click Sign Up. You can now view and download portions of your medical record. 10. Click the Download Summary menu link to download a portable copy of your medical information. If you have questions, please visit the Frequently Asked Questions section of the [employer] website.  Remember, [employer] is NOT to be used for urgent needs. For medical emergencies, dial 911. Now available from your iPhone and Android! Please provide this summary of care documentation to your next provider. Your primary care clinician is listed as MICHAEL BALLARD. If you have any questions after today's visit, please call [phone].

## 2018-09-07 LAB
ALBUMIN SERPL-MCNC: 4.4 G/DL (ref 3.6–4.8)
ALBUMIN/GLOB SERPL: 1.8 {RATIO} (ref 1.2–2.2)
ALP SERPL-CCNC: 59 IU/L (ref 39–117)
ALT SERPL-CCNC: 10 IU/L (ref 0–44)
AST SERPL-CCNC: 14 IU/L (ref 0–40)
BILIRUB SERPL-MCNC: 0.4 MG/DL (ref 0–1.2)
BUN SERPL-MCNC: 22 MG/DL (ref 8–27)
BUN/CREAT SERPL: 15 (ref 10–24)
CALCIUM SERPL-MCNC: 9.4 MG/DL (ref 8.6–10.2)
CHLORIDE SERPL-SCNC: 101 MMOL/L (ref 96–106)
CHOLEST SERPL-MCNC: 232 MG/DL (ref 100–199)
CO2 SERPL-SCNC: 24 MMOL/L (ref 20–29)
CREAT SERPL-MCNC: 1.45 MG/DL (ref 0.76–1.27)
EST. AVERAGE GLUCOSE BLD GHB EST-MCNC: 157 MG/DL
GLOBULIN SER CALC-MCNC: 2.5 G/DL (ref 1.5–4.5)
GLUCOSE SERPL-MCNC: 192 MG/DL (ref 65–99)
HBA1C MFR BLD: 7.1 % (ref 4.8–5.6)
HDLC SERPL-MCNC: 32 MG/DL
INTERPRETATION, 910389: NORMAL
INTERPRETATION: NORMAL
LDLC SERPL CALC-MCNC: 142 MG/DL (ref 0–99)
Lab: NORMAL
PDF IMAGE, 910387: NORMAL
POTASSIUM SERPL-SCNC: 4.6 MMOL/L (ref 3.5–5.2)
PROT SERPL-MCNC: 6.9 G/DL (ref 6–8.5)
SODIUM SERPL-SCNC: 142 MMOL/L (ref 134–144)
TRIGL SERPL-MCNC: 288 MG/DL (ref 0–149)
VLDLC SERPL CALC-MCNC: 58 MG/DL (ref 5–40)

## 2018-09-11 ENCOUNTER — OFFICE VISIT (OUTPATIENT)
Dept: CARDIOLOGY CLINIC | Age: 68
End: 2018-09-11

## 2018-09-11 VITALS
TEMPERATURE: 97.7 F | OXYGEN SATURATION: 97 % | HEART RATE: 68 BPM | SYSTOLIC BLOOD PRESSURE: 142 MMHG | HEIGHT: 70 IN | RESPIRATION RATE: 20 BRPM | BODY MASS INDEX: 29.43 KG/M2 | DIASTOLIC BLOOD PRESSURE: 88 MMHG | WEIGHT: 205.6 LBS

## 2018-09-11 DIAGNOSIS — R06.09 DOE (DYSPNEA ON EXERTION): ICD-10-CM

## 2018-09-11 DIAGNOSIS — I48.0 PAROXYSMAL ATRIAL FIBRILLATION (HCC): ICD-10-CM

## 2018-09-11 DIAGNOSIS — Z95.810 ICD (IMPLANTABLE CARDIOVERTER-DEFIBRILLATOR) IN PLACE: ICD-10-CM

## 2018-09-11 DIAGNOSIS — E11.8 TYPE 2 DIABETES MELLITUS WITH COMPLICATION, UNSPECIFIED WHETHER LONG TERM INSULIN USE: ICD-10-CM

## 2018-09-11 DIAGNOSIS — I25.5 ISCHEMIC CARDIOMYOPATHY: ICD-10-CM

## 2018-09-11 DIAGNOSIS — I73.9 PERIPHERAL VASCULAR DISEASE (HCC): ICD-10-CM

## 2018-09-11 DIAGNOSIS — Z91.14 NONCOMPLIANCE WITH MEDICATION REGIMEN: ICD-10-CM

## 2018-09-11 DIAGNOSIS — I50.22 SYSTOLIC CHF, CHRONIC (HCC): Primary | ICD-10-CM

## 2018-09-11 RX ORDER — METOPROLOL SUCCINATE 50 MG/1
50 TABLET, EXTENDED RELEASE ORAL DAILY
Qty: 30 TAB | Refills: 3 | Status: SHIPPED | OUTPATIENT
Start: 2018-09-11 | End: 2018-11-19

## 2018-09-11 NOTE — PROGRESS NOTES
[facility]  Note      DOS:  9/11/2018     PRIMARY CARE PHYSICIAN: Luba Garcia MD  CARDIOLOGIST: Dr. Holly Goodman   EP: Abdulaziz De Leon MD   Vascular: Dr. Anshu Gurrola     Impression/Plan:   Chronic systolic heart failure (EF 25%), d/t ICM, ACC/AHA Stage D, NYHA class IIb   Resume the Entresto 49/51  mg BID and Toprol XL 50mg daily            Hold on aldactone for now   He has not needed the Prn bumex-               Labs today    Cardiac rehab is still recommended and will be beneficial for the PAD   Continue daily weights, sodium and fluid restriction   Follow  Up in clinic in 1 months, sooner for problems    Encouraged to stop smoking- decrease by 1/2    Labs today     PAD   Refer to  Dr. Jennifer Gunter as pt  would like a surgeon at [facility]      Encouraged to exercise and consider cardiac rehab   Smoking cessation   Encouraged to resume DAPT, statin - he will discuss with Dr. Juan Gracia  CAD s/p PCI- he has seen Dr. Kenny Heath ASA, BB resumed             Encouraged to resume Plavix,             Encouraged to discuss statin with Dr. Juan Gracia on 9/27     PAF-- remains SR- Now off Amiodarone     Resume BB   Dr Niall ePlletier to follow    No anticoag 2/2 GI bleed/AVMs    Recurrent tobacco use   Counseled on smoking cessation   Nicoderm patch     Incomplete LBBB  msec    CKD- Cr stable 1.79   Monitor    Suspect KELLY and COPD             Encouraged him to see pulmonary for eval of COPD and sleep study-   Proventil  Inhaler prn -     Diabetes recent A1c 7.1    Insulin management by PCP            Consider Jardiance given his CAD- defer to Dr. Alexandra Michael- per PCP/cardiology     H/O lower GIB/s/p colorectal surgery negative genetics     Paraesthesias 2/2 DM, PAD - better     Med Non-compliance       Long  discussion about his meds, and need for taking HF meds. He met with SW to assist with enrolling in Medicare Part D and Pt asst from [employer]                Thank you for allowing me to participate in the care of this delightful  gentleman. Please do not hesitate to call with any questions. Opal Pugh RN, ACNP-BC, [facility]       Chief Complaint   Patient presents with    Follow-up     HPI: Harjeet Field. is a 74y.o. year old  male with a history of chronic HFrEF (EF 25%) secondary to ICM- CAD s/p PCI of LM,  of RCA s/p AICD complicated by  PAF (amiodarone) , Hep C, GI bleed (s/p colorectal surgery), and remote tobacco abuse. He presents to clinic today for follow up of his heart failure. He was last seen  2 months ago. His last echo 7/2018 showed : LVD, EF 25%, severe GHK, AK  basal-mid inferior  Wall. G1DD, mild- mod LAE, mild MR/TR, RVSP 42 mmHg. AO root dilation 4.3        Last visit changes:  proventil prn added and he uses some times at night for wheezing    Last ProBNP  1900 (7/2018): BMP 9/6/18: Cr 1.45   K 4.6  LFT OK     Since last visit he has been off of his meds. He was kicked out of Medicare part D and has not taken his CV meds since April. His last visit here in July he did tell us he was taking his meds, except ASA and insulin. He will be able to enroll in Part D coverage next month. Seen by PCP and Dr. Isabella Marin. No changes- he is taking the insulin and baby ASA. [place] Heart Failure Symptom Review     Breathing:   Stable NAVARRO with moderate exertion, stair climbing, (-) benopnea, never tested for KELLY- declined-  + cough moderate mostly in the am, clear  Plegm: some wheezing at night, persistent 1 ppd  Tobacco use. He tried the [employer] brand of Nicoderm and could not stop smoking. ADLs: Able to perform ADLs , denies  NAVARRO     Sleep: well, 8-10 hours, denies  Orthopnea- 1 pillow, denies  PND + Nocturia x 1, (+)  Snoring -  Naps ~ 3 times per week    Fatigue: waxes and wanes,   Decreased stamina, feels better off the meds    Weight: follows daily - stable at 200. He admits to gaining 10# since March 2/2 diet choices and amounts.     Fluid: denies abdominal bloating, LE edema      Meds: only taking insulin and ASA   Appetite/Diet:   Appetite:  Good, tries to eat in moderation. Does not add salt, eats out 3-4 times per week. Denies early satiety, nausea,      Diet:  limiting sodium -  flavoring with herbs     Activity :    Activity:   He tries to be active, and  has activity limiting claudication, occurring with minimal activity- resolves with rest.. He saw Dr. Radha Alford underwent angiogram that showed significant PAD, not amenable to  PTCA and the recommendations are for vascular surgery. He would like a surgeon at [facility]. Exercise: not regularly, has never attended cardiac rehab       Home BP:   130-140/70 range          Denies exertional chest pain, palpitations, lightheadedness, dizziness, syncope, near syncope, AICD shock, bleeding. Denies exertional chest pain, palpitations, orthopnea, PND, syncope, near syncope, no AICD shocks, no bleeding. Fernanda Cohen.  lives with his wife. He recently welcomed his 7th  great grandchild. He is retired from industrial pipe welding. He smoked 50 years- quit 1/2017, and has resumed. occasional etoh. He enjoys old movies, visiting with is 2 adult children and grand children. 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 Review of Systems: :    General:  Positive for fatigue, Denies fever, chills,   negative  HEENT: Denies epistaxsis   Pulmonary: Cough- after cigarette- clear phlegm worse in the morning, some wheezing- using proventil inhaler prn prior to bed   Cardiac: Per HPI   GI:  Denies N/V, abdominal pain, no bleeding or dark stool    Musculo: Arthritis, some neck pain  Neuro: + paraesthesia in feet- better , Denies TIA or CVA sx, sz,    Skin:  negative        CARDIAC EVALUATION   ECHO (2/13/17): EF 10% severe GHK, PSM. Dil LA. Mod MR. Mild to mod TR. Left pleural effusion  ECHO (1/19/17): EF 5-10% with severe GHK,. Mildly dil LA. Mild TR. PASP 46.   ECHO: (5/15/17) EF 25%, severe GHK, basal-mid inferior AK, severe LAE, Mod MR, Mild TR,  ECHO 7/11/2018: LVD, EF 25%, severe GHK, AK  basal-mid inferior  Wall. G1DD, mild- mod LAE, mild MR/TR, RVSP 42 mmHg. AO root dilation 4.3       CATH (1/20/17): LM ost70. LAD m50. D1 80. LCx d70; OM1 99, OM2 90. RCA p100. No AVG. PAP 53/27/36. --- s/p PCI (2/9/17): pRCA 2.5x38 Xience + 2.75x12 Xience. ostLM 4.0x12 Xience post-dil with 4.5x12 NC. ICD (6/12/17, Anup): [employer] single lead    CARDIAC MRI 1/31/17:LVEF 10%, LVH, RV dilated RVEF 25% GHK, marked LAE, mod CB, mild MR, mod-severe TR, The entire LAD territory : largely viable and should recover upon  revascularization. The entire RCA territory is largely viable and should recover  upon revascularization. The LCx territory demonstrate medium-size infarct with  limited viability. Large left-sided pleural effusion with passive atelectasis of the left lung. RICKY (3/20/17): R: 0.58, L: 0.56    LE arteriogm 6/2018: bilateral occluded SFA      EKG 5/3/18: SR,  ms, QTc 449 ms     History:  Past Medical History:   Diagnosis Date    Arthritis     hands/fingers    CAD (coronary artery disease)     involving native coronary artery of native heart without angina pectoris    Cardiomyopathy ([address] 01/20/2017    A. Echo (1/19/17):  EF 5-10% with severe GHK,. Mildly dil LA. Mild TR. PASP 46./systemic cardiomyopathy    Chronic renal insufficiency 03/06/2017    Diabetes mellitus ([address] 3/6/2017    IDDM    Dyslipidemia 3/6/2017    A. FLP (1/19/17): Tot 120, , HDL 19, LDL 76 (no Rx).  H/O: GI bleed 3/6/2017    Hepatitis C 3/6/2017    Ill-defined condition     hypokalemia    Ill-defined condition     flu 1/2018     Noncompliance with medication regimen 9/11/2018    Paroxysmal atrial fibrillation ([facility] 3/6/2017    Peripheral vascular disease ([facility] 9/18/2017    A. RICKY (3/20/17): Right 0.58, Left 0.56.     Vitamin D deficiency 6/4/2018     Past Surgical History:   Procedure Laterality Date    COLONOSCOPY N/A 2017    COLONOSCOPY performed by Idalmis Jo. Arturo Mcduffie MD at [address] COLONOSCOPY N/A 2018    COLONOSCOPY performed by Idalmis Jo. Arturo Mcduffie MD at [address] HX COLONOSCOPY      [facility] 2017    HX GI      colonoscopy x 3 - last 2017 - polyp    HX HEART CATHETERIZATION      3 heart stents    HX OTHER SURGICAL  2017    AICD    HX PACEMAKER      AICD     Social History     Social History    Marital status:      Spouse name: N/A    Number of children: N/A    Years of education: N/A     Occupational History    Not on file. Social History Main Topics    Smoking status: Current Every Day Smoker     Packs/day: 0.50     Years: 45.00     Last attempt to quit: 2017    Smokeless tobacco: Never Used      Comment: using Nicotine patches    Alcohol use 1.2 oz/week     2 Cans of beer per week    Drug use: Yes     Special: Marijuana    Sexual activity: No     Other Topics Concern    Not on file     Social History Narrative     Family History   Problem Relation Age of Onset    Hypertension Mother     Heart Disease Mother      MURMUR    Cancer Father      COLON    Colon Cancer Father     Other Sister      born totally handicapped/epileptic    Kidney Disease Sister       from renal shutdown    Heart Disease Brother     Other Brother      ?pancreatic cancer or ?pancreatitis    Cancer Maternal Uncle      toes and spread    Cancer Paternal Uncle      stomach    Heart Attack Maternal Grandfather 47    Cancer Paternal Grandfather      bone    Cancer Maternal Uncle      stomach    Cancer Maternal Uncle      lung    Anesth Problems Neg Hx        Current Medications:   Current Outpatient Prescriptions   Medication Sig Dispense Refill    aspirin 81 mg chewable tablet Take 81 mg by mouth daily.       insulin NPH/insulin regular (NOVOLIN 70/30) 100 unit/mL (70-30) injection 10 Units by SubCUTAneous route Daily (before breakfast).  insulin NPH/insulin regular (NOVOLIN 70/30) 100 unit/mL (70-30) injection 8 Units by SubCUTAneous route Daily (before dinner).  Insulin Syringes, Disposable, 1 mL syrg Pt to take 10 units w/ breakfast and 8 units w/ dinner 500 Syringe 1    atorvastatin (LIPITOR) 40 mg tablet TAKE ONE TABLET BY MOUTH ONCE DAILY  Indications: hyperlipidemia 90 Tab 1    cholecalciferol (VITAMIN D3) 1,000 unit tablet Take 2 Tabs by mouth daily.  metoprolol succinate (TOPROL XL) 50 mg XL tablet Take 1 Tab by mouth daily for 360 days. Indications: chronic heart failure 30 Tab 3    albuterol (PROVENTIL HFA, VENTOLIN HFA, PROAIR HFA) 90 mcg/actuation inhaler Take 1 Puff by inhalation every four (4) hours as needed for Wheezing. Dr Heriberto Kilgore or Dr. Raeann Phan to refill 1 Inhaler 0    spironolactone (ALDACTONE) 25 mg tablet Take 1 Tab by mouth daily. 90 Tab 1    clopidogrel (PLAVIX) 75 mg tab Take  by mouth.  DULoxetine (CYMBALTA) 30 mg capsule Take 1 Cap by mouth daily. (Patient taking differently: Take 30 mg by mouth daily (with dinner). ) 90 Cap 3    amiodarone (CORDARONE) 200 mg tablet Take 1 Tab by mouth daily. Indications: PREVENTION OF RECURRENT ATRIAL FIBRILLATION 30 Tab 6    sacubitril-valsartan (ENTRESTO) 97 mg/103 mg tablet Take 1 Tab by mouth two (2) times a day. 180 Tab 4    patiromer calcium sorbitex (VELTASSA) 8.4 gram powder Take 8.4 g by mouth daily. 4 Packet 0    bumetanide (BUMEX) 1 mg tablet Take 0.5 mg by mouth as needed (fluid retention).  acetaminophen (TYLENOL EXTRA STRENGTH) 500 mg tablet Take 1,000 mg by mouth every six (6) hours as needed for Pain. Indications: BACK PAIN         Allergies: Allergies   Allergen Reactions    Heparin (Porcine) Other (comments)     Was told when in the hospital that if he received heparin again that it would be deadly.      Visit Vitals    /88 (BP 1 Location: Left arm, BP Patient Position: Sitting)    Pulse 68    Temp 97.7 °F (36.5 °C) (Oral)    Resp 20    Ht 5' 10\" (1.778 m)    Wt 205 lb 9.6 oz (93.3 kg)    SpO2 97%    BMI 29.5 kg/m2       Physical Exam:     Constitutional:  A&O x 3, well developed, WM in NAD  -older than his stated age   HENT[de-identified]   Normocephalic and atraumatic. mucous membranes pink, moist   Neck:   Neck supple. No lymphadenopathy R carotid bruit   Cardiovascular:  PMI nondisplaced, AICD R infraclavicular space, RRR, S1 & S2, + S3,  2/6 systolic murmur at apex,   JVP ~10 cm, no  HJR. Pulmonary/Chest:  Effort normal, clear, no wheezing noted. .   Abdominal:   Soft. Not distended, non-tender, + bowel sounds   Extremities:   Distal calf vascular changes, no edema, no cyanosis, feet warm , + femoral pulses, + bilateral femoral bruits, difficult to palpate distal pedal pulses    Neurological:  A&O x 3 nofocal deficits    Skin:    Skin is warm and dry. Psychiatric:   He has a normal mood and affect.        Vitals:    Visit Vitals    /88 (BP 1 Location: Left arm, BP Patient Position: Sitting)    Pulse 68    Temp 97.7 °F (36.5 °C) (Oral)    Resp 20    Ht 5' 10\" (1.778 m)    Wt 205 lb 9.6 oz (93.3 kg)    SpO2 97%    BMI 29.5 kg/m2           Recent Labs:   Lab Results   Component Value Date/Time    WBC 7.8 03/08/2018 02:42 PM    HGB 13.8 03/08/2018 02:42 PM    HCT 42.0 03/08/2018 02:42 PM    PLATELET 030 19/43/9893 02:42 PM    MCV 87.0 03/08/2018 02:42 PM     Lab Results   Component Value Date/Time    TSH 3.040 12/04/2017 10:17 AM      Lab Results   Component Value Date/Time    BNP 1209 (H) 01/21/2017 12:25 PM    NT pro-BNP 6258 (H) 01/31/2017 04:52 AM    NT pro-BNP 6094 (H) 01/30/2017 04:16 AM    NT pro-BNP 6168 (H) 01/29/2017 04:18 AM    NT pro-BNP 6876 (H) 01/28/2017 03:38 AM    NT pro-BNP 5427 (H) 01/27/2017 03:56 AM    PROBNP 1907 (H) 07/05/2018 12:00 AM    PROBNP 1705 (H) 05/23/2018 12:00 AM    PROBNP 658 (H) 03/29/2018 12:00 AM    PROBNP 953 (H) 11/16/2017 12:00 AM    PROBNP 598 (H) 09/05/2017 05:20 PM      Lab Results   Component Value Date/Time    Sodium 142 09/06/2018 02:32 PM    Potassium 4.6 09/06/2018 02:32 PM    Chloride 101 09/06/2018 02:32 PM    CO2 24 09/06/2018 02:32 PM    Anion gap 6 03/08/2018 02:42 PM    Glucose 192 (H) 09/06/2018 02:32 PM    BUN 22 09/06/2018 02:32 PM    Creatinine 1.45 (H) 09/06/2018 02:32 PM    BUN/Creatinine ratio 15 09/06/2018 02:32 PM    GFR est AA 58 (L) 09/06/2018 02:32 PM    GFR est non-AA 50 (L) 09/06/2018 02:32 PM    Calcium 9.4 09/06/2018 02:32 PM    Bilirubin, total 0.4 09/06/2018 02:32 PM    ALT (SGPT) 10 09/06/2018 02:32 PM    AST (SGOT) 14 09/06/2018 02:32 PM    Alk. phosphatase 59 09/06/2018 02:32 PM    Protein, total 6.9 09/06/2018 02:32 PM    Albumin 4.4 09/06/2018 02:32 PM    Globulin 4.4 (H) 02/22/2017 03:16 AM    A-G Ratio 1.8 09/06/2018 02:32 PM      Lab Results   Component Value Date/Time    Hemoglobin A1c 7.1 (H) 09/06/2018 02:32 PM    Hemoglobin A1c (POC) 6.4 08/17/2017 11:00 AM          I have discussed the diagnosis with  Max Montiel. and the intended plan as seen in the above orders. Questions were answered concerning future plans, and written instructions provided. I have discussed medication side effects and warnings with the patient as well. Thank you for allowing me to participate in the care of this delightful gentleman. Please do not hesitate to call with any questions. Opal Mercer RN, ACNP-BC, [address] [phone]  24 hour VAD/HF Pager: [phone]

## 2018-09-11 NOTE — LETTER
9/11/2018 2:19 PM 
 
Patient:  Hina Rushing. YOB: 1950 Date of Visit: 9/11/2018 Dear Roseanne Escoto MD 
[address] [address] VIA In Basket Eldon Fraga MD 
[facility] [address] [facility] [place] [facility] [phone] VIA Facsimile: [phone] Aimee Pacheco MD 
[address] [address] VIA In Basket Sridevi Liu MD 
[address] [facility] [address] VIA In Basket 
 : Thank you for referring Mr. Jaspreet Davis to me for evaluation/treatment. Below are the relevant portions of my assessment and plan of care. If you have questions, please do not hesitate to call me. I look forward to following Mr. Kelli Sousa along with you. Sincerely, JENI Harris

## 2018-09-11 NOTE — MR AVS SNAPSHOT
[address] [facility], [address] 
[phone] Patient: Heath Hahn. MRN: [id number] OMQ:20/16/1466 Visit Information Date & Time Provider Department Dept. Phone Encounter #  
 9/11/2018 12:30 PM Yoselin Aguilar [id number] Follow-up Instructions Return in about 1 month (around 10/11/2018). Your Appointments 9/27/2018 10:20 AM  
ESTABLISHED PATIENT with Terrie Dey MD  
[facility] ([facility]) Appt Note: 6 mo fu  
 [address] [place] [address]  
[phone]  
  
   
 [address] [facility] [address] [address]  
  
    
 12/6/2018  1:20 PM  
ESTABLISHED PATIENT with Marcos Huerta MD  
[address] [facility]) Appt Note: 3mo fuv  
 [address] [address] [address] [address]  
[phone]  
  
   
 [address] [address] [address] [address]  
  
    
 6/5/2019 10:15 AM  
PACEMAKER with PACEMAKER, STANCES  
[facility] ([facility]) Appt Note: Yuliet sci ICD   STUDY   DONT MOVE CALL CECIL [phone]  
 [address] [place] [address]  
[phone]  
  
   
 [address]am [address] [place] [address]  
  
    
  
 11/8/2018  1:00 PM  
REMOTE OFFICE VISIT with Towana Ormond [facility] ([facility]) Appt Note: Lat ICD   study [address] [place] [address] Upcoming Health Maintenance Date Due  
 FOOT EXAM Q1 11/23/1962 DTaP/Tdap/Td series (1 - Tdap) 11/23/1973 ZOSTER VACCINE AGE 60> 9/23/2012 GLAUCOMA SCREENING Q2Y 11/23/2017 COLONOSCOPY 2/5/2019 Influenza Age 5 to Adult 3/31/2019* Pneumococcal 65+ Low/Medium Risk (2 of 2 - PPSV23) 11/15/2018 MICROALBUMIN Q1 12/4/2018 MEDICARE YEARLY EXAM 3/6/2019 HEMOGLOBIN A1C Q6M 3/6/2019 EYE EXAM RETINAL OR DILATED Q1 6/6/2019 LIPID PANEL Q1 9/6/2019 *Topic was postponed. The date shown is not the original due date. Allergies as of 9/11/2018  Review Complete On: 9/11/2018 By: JENI Ham Severity Noted Reaction Type Reactions Heparin (Porcine) High 02/07/2017    Other (comments) Was told when in the hospital that if he received heparin again that it would be deadly. Current Immunizations  Reviewed on 12/4/2017 Name Date Influenza Nasal Vaccine 11/15/2017 Pneumococcal Conjugate (PCV-13) 11/15/2017 Not reviewed this visit You Were Diagnosed With   
  
 Codes Comments Systolic CHF, chronic (HCC)    -  Primary ICD-10-CM: A08.83 ICD-9-CM: 428.22, 428.0 Ischemic cardiomyopathy     ICD-10-CM: I25.5 ICD-9-CM: 414.8 Paroxysmal atrial fibrillation (HCC)     ICD-10-CM: I48.0 ICD-9-CM: 427.31 Peripheral vascular disease ([address]     ICD-10-CM: I73.9 ICD-9-CM: 443. 9 Type 2 diabetes mellitus with complication, unspecified whether long term insulin use (HCC)     ICD-10-CM: E11.8 ICD-9-CM: 250.90 ICD (implantable cardioverter-defibrillator) in place     ICD-10-CM: Z95.810 ICD-9-CM: V45.02 Noncompliance with medication regimen     ICD-10-CM: Z91.14 
ICD-9-CM: V15.81   
 NAVARRO (dyspnea on exertion)     ICD-10-CM: R06.09 
ICD-9-CM: 786.09 Vitals BP Pulse Temp Resp Height(growth percentile) Weight(growth percentile) 142/88 (BP 1 Location: Left arm, BP Patient Position: Sitting) 68 97.7 °F (36.5 °C) (Oral) 20 5' 10\" (1.778 m) 205 lb 9.6 oz (93.3 kg) SpO2 BMI Smoking Status 97% 29.5 kg/m2 Current Every Day Smoker Vitals History BMI and BSA Data Body Mass Index Body Surface Area  
 29.5 kg/m 2 2.15 m 2 Preferred Pharmacy Pharmacy Name Phone [facility] [place] [phone] Your Updated Medication List  
  
   
This list is accurate as of 9/11/18  2:02 PM.  Always use your most recent med list.  
  
  
  
  
 albuterol 90 mcg/actuation inhaler Commonly known as:  PROVENTIL HFA, VENTOLIN HFA, PROAIR HFA Take 1 Puff by inhalation every four (4) hours as needed for Wheezing. Dr Shruthi Shah or Dr. Bhanu Lauren to refill  
  
 amiodarone 200 mg tablet Commonly known as:  CORDARONE Take 1 Tab by mouth daily. Indications: PREVENTION OF RECURRENT ATRIAL FIBRILLATION  
  
 aspirin 81 mg chewable tablet Take 81 mg by mouth daily. atorvastatin 40 mg tablet Commonly known as:  LIPITOR  
TAKE ONE TABLET BY MOUTH ONCE DAILY  Indications: hyperlipidemia  
  
 bumetanide 1 mg tablet Commonly known as:  [place] Take 0.5 mg by mouth as needed (fluid retention). DULoxetine 30 mg capsule Commonly known as:  CYMBALTA Take 1 Cap by mouth daily. Insulin Syringes (Disposable) 1 mL Syrg Pt to take 10 units w/ breakfast and 8 units w/ dinner  
  
 metoprolol succinate 50 mg XL tablet Commonly known as:  TOPROL XL Take 1 Tab by mouth daily for 360 days. Indications: chronic heart failure * NovoLIN 70/30 U-100 Insulin 100 unit/mL (70-30) injection Generic drug:  insulin NPH/insulin regular 10 Units by SubCUTAneous route Daily (before breakfast). * NovoLIN 70/30 U-100 Insulin 100 unit/mL (70-30) injection Generic drug:  insulin NPH/insulin regular  
8 Units by SubCUTAneous route Daily (before dinner). patiromer calcium sorbitex 8.4 gram powder Commonly known as:  VELTASSA Take 8.4 g by mouth daily. PLAVIX 75 mg Tab Generic drug:  clopidogrel Take  by mouth. sacubitril-valsartan 49-51 mg Tab tablet Commonly known as:  ENTRESTO Take 1 Tab by mouth two (2) times a day. Indications: chronic heart failure  
  
 spironolactone 25 mg tablet Commonly known as:  ALDACTONE Take 1 Tab by mouth daily. TYLENOL EXTRA STRENGTH 500 mg tablet Generic drug:  acetaminophen Take 1,000 mg by mouth every six (6) hours as needed for Pain. Indications: BACK PAIN  
  
 VITAMIN D3 1,000 unit tablet Generic drug:  cholecalciferol Take 2 Tabs by mouth daily. * Notice: This list has 2 medication(s) that are the same as other medications prescribed for you. Read the directions carefully, and ask your doctor or other care provider to review them with you. Prescriptions Sent to Pharmacy Refills  
 metoprolol succinate (TOPROL XL) 50 mg XL tablet 3 Sig: Take 1 Tab by mouth daily for 360 days. Indications: chronic heart failure Class: Normal  
 [facility] #: [phone] Route: Oral  
  
We Performed the Following METABOLIC PANEL, BASIC [34021 CPT(R)] NT-PRO BNP Y1031500 CPT(R)] Follow-up Instructions Return in about 1 month (around 10/11/2018). Patient Instructions Resume Entresto at 49/51 mg ( Ok to cut 97/103 mg in half), resume Toprol XL 50 mg  
STOP smoking CONTINUE CURRENT medications Take twice a day medications 12 hours apart with food Labs today BMP, proBNP See Dr. Holly Zeng 9/27 ask him about cholesterol and antiplatelet medication Future Appointments Date Time Provider Shelley Rucker 9/27/2018 10:20 AM MD BEULAH Grove [facility]  
11/8/2018 1:00 PM HAYLEE JUAREZ [facility]  
12/6/2018 1:20 PM Demetrice Pham MD [facility]  
6/5/2019 10:15 AM PACEMAKER, ALYSSA ARORASF [facility] SCH[facility] Limit WHITE foods (flour, sugar, pasta, rice, potatoes) Walk more- consider cardiac rehab this will help your heart and your legs Keep a positive attitude Consider Vascular surgery HF Education: Continue daily weights (in the morning, after voiding). Notify HF team of overnight weight gains > 2 lbs or weekly >5 lbs or if any of the following Sx. Continue to limit sodium intake & monitor your fluid intake . Introducing [facility] & HEALTH SERVICES! Alexus Abdullahi introduces AdTrib patient portal. Now you can access parts of your medical record, email your doctor's office, and request medication refills online. 1. In your internet browser, go to [email or web address] 2. Click on the First Time User? Click Here link in the Sign In box. You will see the New Member Sign Up page. 3. Enter your AdTrib Access Code exactly as it appears below. You will not need to use this code after youve completed the sign-up process. If you do not sign up before the expiration date, you must request a new code. · AdTrib Access Code: [email or web address] Expires: 12/5/2018  2:28 PM 
 
4. Enter the last four digits of your Social Security Number (xxxx) and Date of Birth (mm/dd/yyyy) as indicated and click Submit. You will be taken to the next sign-up page. 5. Create a AdTrib ID. This will be your AdTrib login ID and cannot be changed, so think of one that is secure and easy to remember. 6. Create a AdTrib password. You can change your password at any time. 7. Enter your Password Reset Question and Answer. This can be used at a later time if you forget your password. 8. Enter your e-mail address. You will receive e-mail notification when new information is available in [address] 9. Click Sign Up. You can now view and download portions of your medical record. 10. Click the Download Summary menu link to download a portable copy of your medical information. If you have questions, please visit the Frequently Asked Questions section of the AdTrib website. Remember, AdTrib is NOT to be used for urgent needs. For medical emergencies, dial 911. Now available from your iPhone and Android! Please provide this summary of care documentation to your next provider. Your primary care clinician is listed as MICHAEL BALLARD. If you have any questions after today's visit, please call [phone].

## 2018-09-11 NOTE — PROGRESS NOTES
Patient called and verified. Reviewed cholesterol labs and rec to restart meds. He states he is in the process of consulting with [facility] Dr. Cezar Barbosa about some medication changes and will discuss this and call our office back with all the medication changes. He is ok with starting back on cholesterol meds, but he will consult Dr. Cezar Barbosa for all his other meds.

## 2018-09-11 NOTE — PATIENT INSTRUCTIONS
Resume Entresto at 49/51 mg ( Ok to cut 97/103 mg in half), resume Toprol XL 50 mg   STOP smoking   CONTINUE CURRENT medications    Take twice a day medications 12 hours apart with food    Labs today BMP, proBNP     See Dr. Chan Her 9/27 ask him about cholesterol and antiplatelet medication   Future Appointments  Date Time Provider Shelley Alka   9/27/2018 10:20 AM Mega Saavedra MD [facility]   11/8/2018 1:00 PM REMOTE1, LEACH CAVSF [facility] SCHED   12/6/2018 1:20 PM Fortino Ford MD [facility] Mar[facility] Gottron   6/5/2019 10:15 AM PACEMAKER, STFRANCES CAV[facility] SCHED         Limit WHITE foods (flour, sugar, pasta, rice, potatoes)    Walk more- consider cardiac rehab this will help your heart and your legs     Keep a positive attitude     Consider Vascular surgery     HF Education: Continue daily weights (in the morning, after voiding). Notify HF team of overnight weight gains > 2 lbs or weekly >5 lbs or if any of the following Sx. Continue to limit sodium intake & monitor your fluid intake .

## 2018-09-13 LAB
BUN SERPL-MCNC: 22 MG/DL (ref 8–27)
BUN/CREAT SERPL: 15 (ref 10–24)
CALCIUM SERPL-MCNC: 9.3 MG/DL (ref 8.6–10.2)
CHLORIDE SERPL-SCNC: 102 MMOL/L (ref 96–106)
CO2 SERPL-SCNC: 21 MMOL/L (ref 20–29)
CREAT SERPL-MCNC: 1.5 MG/DL (ref 0.76–1.27)
GLUCOSE SERPL-MCNC: 133 MG/DL (ref 65–99)
NT-PROBNP SERPL-MCNC: 1399 PG/ML (ref 0–376)
POTASSIUM SERPL-SCNC: 4.7 MMOL/L (ref 3.5–5.2)
SODIUM SERPL-SCNC: 142 MMOL/L (ref 134–144)

## 2018-09-13 NOTE — PROGRESS NOTES
Labs stable  Continue Current plan-   Recheck labs in 7-10 days please.    I have CC\"d PCP and nephrology   Thank you

## 2018-09-14 ENCOUNTER — DOCUMENTATION ONLY (OUTPATIENT)
Dept: CARDIOLOGY CLINIC | Age: 68
End: 2018-09-14

## 2018-09-14 ENCOUNTER — TELEPHONE (OUTPATIENT)
Dept: CARDIOLOGY CLINIC | Age: 68
End: 2018-09-14

## 2018-09-14 DIAGNOSIS — I50.22 SYSTOLIC CHF, CHRONIC (HCC): Primary | ICD-10-CM

## 2018-09-14 DIAGNOSIS — R06.02 SHORTNESS OF BREATH: ICD-10-CM

## 2018-09-14 NOTE — PROGRESS NOTES
Patient's novartis patient assistance application for entresto faxed to [facility] on this date.     Kane Forbes, MSW, LCSW    Clinical    Jennifer Roman [phone]   Respecting Cho[employer] ® ACP Facilitator

## 2018-09-14 NOTE — TELEPHONE ENCOUNTER
----- Message from JENI Bejarano sent at 9/13/2018  9:07 AM EDT -----  Labs stable  Continue Current plan-   Recheck labs in 7-10 days please. I have CC\"d PCP and nephrology   Thank you    I called patient and reviewed all labs, he states understanding and has no questions. Labs ordered.

## 2018-09-20 ENCOUNTER — TELEPHONE (OUTPATIENT)
Dept: CARDIOLOGY CLINIC | Age: 68
End: 2018-09-20

## 2018-09-20 DIAGNOSIS — I50.22 SYSTOLIC CHF, CHRONIC (HCC): Primary | ICD-10-CM

## 2018-09-20 DIAGNOSIS — I25.5 ISCHEMIC CARDIOMYOPATHY: ICD-10-CM

## 2018-09-20 NOTE — TELEPHONE ENCOUNTER
Pt in for labs to be drawn. Confirmed he was taking his medications. He receives Entresto from [facility] and is concerned he may run out. Samples provided.  He has refiled paper work with [facility] and we are waiting for approval.

## 2018-09-21 ENCOUNTER — DOCUMENTATION ONLY (OUTPATIENT)
Dept: CARDIOLOGY CLINIC | Age: 68
End: 2018-09-21

## 2018-09-21 LAB
BUN SERPL-MCNC: 20 MG/DL (ref 8–27)
BUN/CREAT SERPL: 13 (ref 10–24)
CALCIUM SERPL-MCNC: 9.4 MG/DL (ref 8.6–10.2)
CHLORIDE SERPL-SCNC: 101 MMOL/L (ref 96–106)
CO2 SERPL-SCNC: 20 MMOL/L (ref 20–29)
CREAT SERPL-MCNC: 1.54 MG/DL (ref 0.76–1.27)
GLUCOSE SERPL-MCNC: 193 MG/DL (ref 65–99)
NT-PROBNP SERPL-MCNC: 1086 PG/ML (ref 0–376)
POTASSIUM SERPL-SCNC: 4 MMOL/L (ref 3.5–5.2)
SODIUM SERPL-SCNC: 142 MMOL/L (ref 134–144)

## 2018-09-21 NOTE — PROGRESS NOTES
called the patient to notify him he's been approved/renrolled in [facility] patient assistance program for free entresto. Provided him with the contact number to call [facility] to schedule his first shipment. He did not articulate any major questions/concerns.      Kane Forbes, MSW, LCSW    Clinical    Jennifer Roman [phone]   [employer] ® ACP Facilitator

## 2018-09-24 ENCOUNTER — TELEPHONE (OUTPATIENT)
Dept: CARDIOLOGY CLINIC | Age: 68
End: 2018-09-24

## 2018-09-24 NOTE — TELEPHONE ENCOUNTER
----- Message from JENI Francois sent at 9/24/2018  8:24 AM EDT -----  Labs stable  Continue Current plan- he is back on his meds:)    Thank you    I called patient and reviewed all labs, he states understanding. He is only taking insulin, entresto, aspiin, and metoprolol.

## 2018-09-27 ENCOUNTER — OFFICE VISIT (OUTPATIENT)
Dept: CARDIOLOGY CLINIC | Age: 68
End: 2018-09-27

## 2018-09-27 VITALS
SYSTOLIC BLOOD PRESSURE: 160 MMHG | HEIGHT: 70 IN | WEIGHT: 208 LBS | BODY MASS INDEX: 29.78 KG/M2 | HEART RATE: 60 BPM | DIASTOLIC BLOOD PRESSURE: 90 MMHG

## 2018-09-27 DIAGNOSIS — I25.5 ISCHEMIC CARDIOMYOPATHY: Primary | ICD-10-CM

## 2018-09-27 DIAGNOSIS — I25.10 CORONARY ARTERY DISEASE INVOLVING NATIVE CORONARY ARTERY OF NATIVE HEART WITHOUT ANGINA PECTORIS: ICD-10-CM

## 2018-09-27 DIAGNOSIS — I48.0 PAROXYSMAL ATRIAL FIBRILLATION (HCC): ICD-10-CM

## 2018-09-27 NOTE — MR AVS SNAPSHOT
[address] [facility] 600 [address] [facility] 
[phone] Patient: Galileo Calle. MRN: [id number] OMQ:88/49/[id number] Visit Information Date & Time Provider Department Dept. Phone Encounter #  
 9/27/2018 10:20 AM Nitin Yen MD [facility] Brenda Oh [phone] [id number] Your Appointments 10/9/2018 11:00 AM  
Follow Up with Hilma Lesches, [facility] [address] [facility] ([facility]) Appt Note: HF follow up [facility] [address]  
411 Replaced by [facility], [facility] [phone]  
  
    
 12/6/2018  1:20 PM  
ESTABLISHED PATIENT with Sandy Bary MD  
5900 [facility]) Appt Note: 3mo fuv  
 [address] [place] [address]  
[phone]  
  
   
 [address] [place] [address]  
  
    
 3/29/2019 10:20 AM  
ESTABLISHED PATIENT with Nitin Yen MD  
[facility] ([facility]) Appt Note: 6 mo fu  
 [address] [facility] [address] 600 [facility] [address] [phone]  
  
   
 [address] [facility] [address]am [address] [place] [address]  
  
    
 6/5/2019 10:15 AM  
PACEMAKER with PACEMAKERALYSSA  
[facility] ([facility] SCHEDULING) Appt Note: Yuliet sci ICD   STUDY   DONT MOVE CALL [facility] [phone]  
 [address] [facility] [address] [place] [address]  
[phone]  
  
   
 320 [facility] [address] 501 [facility] [address]  
  
    
  
 11/8/2018  1:00 PM  
REMOTE OFFICE VISIT with Bland Carrel [facility] ([facility]) Appt Note: Lat ICD   study [address] [facility] [address] 600 1900 [facility]  
[address] [place] [address] [address] Upcoming Health Maintenance Date Due  
 FOOT EXAM Q1 11/23/1962 DTaP/Tdap/Td series (1 - Tdap) 11/23/1973 Shingrix Vaccine Age 50> (1 of 2) 11/23/2002 GLAUCOMA SCREENING Q2Y 11/23/2017 COLONOSCOPY 2/5/2019 Influenza Age 5 to Adult 3/31/2019* Pneumococcal 65+ Low/Medium Risk (2 of 2 - PPSV23) 11/15/2018 MICROALBUMIN Q1 12/4/2018 MEDICARE YEARLY EXAM 3/6/2019 HEMOGLOBIN A1C Q6M 3/6/2019 EYE EXAM RETINAL OR DILATED Q1 6/6/2019 LIPID PANEL Q1 9/6/2019 *Topic was postponed. The date shown is not the original due date. Allergies as of 9/27/2018  Review Complete On: 9/27/2018 By: Usha Alejandre MD  
  
 Severity Noted Reaction Type Reactions Heparin (Porcine) High 02/07/2017    Other (comments) Was told when in the hospital that if he received heparin again that it would be deadly. Current Immunizations  Reviewed on 12/4/2017 Name Date Influenza Nasal Vaccine 11/15/2017 Pneumococcal Conjugate (PCV-13) 11/15/2017 Not reviewed this visit You Were Diagnosed With   
  
 Codes Comments Ischemic cardiomyopathy    -  Primary ICD-10-CM: I25.5 ICD-9-CM: 414.8 Paroxysmal atrial fibrillation (HCC)     ICD-10-CM: I48.0 ICD-9-CM: 427.31 Coronary artery disease involving native coronary artery of native heart without angina pectoris     ICD-10-CM: I25.10 ICD-9-CM: 414.01 Vitals BP Pulse Height(growth percentile) Weight(growth percentile) BMI Smoking Status 160/90 60 5' 10\" (1.778 m) 208 lb (94.3 kg) 29.84 kg/m2 Current Some Day Smoker Vitals History BMI and BSA Data Body Mass Index Body Surface Area  
 29.84 kg/m 2 2.16 m 2 Preferred Pharmacy Pharmacy Name Phone [address], [address] [place] [phone] Your Updated Medication List  
  
   
This list is accurate as of 9/27/18 10:41 AM.  Always use your most recent med list.  
  
  
  
  
 amiodarone 200 mg tablet Commonly known as:  CORDARONE  
 Take 1 Tab by mouth daily. Indications: PREVENTION OF RECURRENT ATRIAL FIBRILLATION  
  
 aspirin 81 mg chewable tablet Take 81 mg by mouth daily. atorvastatin 40 mg tablet Commonly known as:  LIPITOR  
TAKE ONE TABLET BY MOUTH ONCE DAILY  Indications: hyperlipidemia  
  
 bumetanide 1 mg tablet Commonly known as:  [place]athy [place] Take 0.5 mg by mouth as needed (fluid retention). DULoxetine 30 mg capsule Commonly known as:  CYMBALTA Take 1 Cap by mouth daily. Insulin Syringes (Disposable) 1 mL Syrg Pt to take 10 units w/ breakfast and 8 units w/ dinner  
  
 metoprolol succinate 50 mg XL tablet Commonly known as:  TOPROL XL Take 1 Tab by mouth daily for 360 days. Indications: chronic heart failure * NovoLIN 70/30 U-100 Insulin 100 unit/mL (70-30) injection Generic drug:  insulin NPH/insulin regular 10 Units by SubCUTAneous route Daily (before breakfast). * NovoLIN 70/30 U-100 Insulin 100 unit/mL (70-30) injection Generic drug:  insulin NPH/insulin regular  
8 Units by SubCUTAneous route Daily (before dinner). patiromer calcium sorbitex 8.4 gram powder Commonly known as:  VELTASSA Take 8.4 g by mouth daily. * sacubitril-valsartan 49-51 mg Tab tablet Commonly known as:  ENTRESTO Take 1 Tab by mouth two (2) times a day. Indications: chronic heart failure * sacubitril-valsartan  mg tablet Commonly known as:  ENTRESTO Take 1 Tab by mouth two (2) times a day. spironolactone 25 mg tablet Commonly known as:  ALDACTONE Take 1 Tab by mouth daily. TYLENOL EXTRA STRENGTH 500 mg tablet Generic drug:  acetaminophen Take 1,000 mg by mouth every six (6) hours as needed for Pain. Indications: BACK PAIN  
  
 VITAMIN D3 1,000 unit tablet Generic drug:  cholecalciferol Take 2 Tabs by mouth daily. * Notice:   This list has 4 medication(s) that are the same as other medications prescribed for you. Read the directions carefully, and ask your doctor or other care provider to review them with you. Prescriptions Printed Refills  
 sacubitril-valsartan (ENTRESTO) 97 mg/103 mg tablet 3 Sig: Take 1 Tab by mouth two (2) times a day. Class: Print Route: Oral  
  
Introducing [facility] & HEALTH SERVICES! Cathy Larsen introduces Datalot patient portal. Now you can access parts of your medical record, email your doctor's office, and request medication refills online. 1. In your internet browser, go to [email or web address] 2. Click on the First Time User? Click Here link in the Sign In box. You will see the New Member Sign Up page. 3. Enter your Datalot Access Code exactly as it appears below. You will not need to use this code after youve completed the sign-up process. If you do not sign up before the expiration date, you must request a new code. · Datalot Access Code: [email or web address] Expires: 12/5/2018  2:28 PM 
 
4. Enter the last four digits of your Social Security Number (xxxx) and Date of Birth (mm/dd/yyyy) as indicated and click Submit. You will be taken to the next sign-up page. 5. Create a Datalot ID. This will be your Datalot login ID and cannot be changed, so think of one that is secure and easy to remember. 6. Create a Datalot password. You can change your password at any time. 7. Enter your Password Reset Question and Answer. This can be used at a later time if you forget your password. 8. Enter your e-mail address. You will receive e-mail notification when new information is available in [address] 9. Click Sign Up. You can now view and download portions of your medical record. 10. Click the Download Summary menu link to download a portable copy of your medical information. If you have questions, please visit the Frequently Asked Questions section of the Datalot website.  Remember, Datalot is NOT to be used for urgent needs. For medical emergencies, dial 911. Now available from your iPhone and Android! Please provide this summary of care documentation to your next provider. Your primary care clinician is listed as MICHAEL BALLARD. If you have any questions after today's visit, please call [phone].

## 2018-09-27 NOTE — PROGRESS NOTES
Morteza Bolanos MD. [facility]              Patient: Preet Ernandez. : 1950      Today's Date: 2018            HISTORY OF PRESENT ILLNESS:     History of Present Illness:  Here for follow-up. He was kicked off of Medicare Part D for non-payments and he plans on re-enrolling next month. Has been off of some meds while off of insurance. No CP or SOB. Says he feels great. No sig leg pain unless he walks a great distance. BP a little high. PAST MEDICAL HISTORY:     Past Medical History:   Diagnosis Date    Arthritis     hands/fingers    CAD (coronary artery disease)     involving native coronary artery of native heart without angina pectoris    Cardiomyopathy ([facility] [address] 2017    A. Echo (17):  EF 5-10% with severe GHK,. Mildly dil LA. Mild TR. PASP 46./systemic cardiomyopathy    Chronic renal insufficiency 2017    Diabetes mellitus ([facility] [address] 3/6/2017    IDDM    Dyslipidemia 3/6/2017    A. FLP (17): Tot 120, , HDL 19, LDL 76 (no Rx).  H/O: GI bleed 3/6/2017    Hepatitis C 3/6/2017    Ill-defined condition     hypokalemia    Ill-defined condition     flu 2018     Noncompliance with medication regimen 2018    Paroxysmal atrial fibrillation ([facility] [address] 3/6/2017    Peripheral vascular disease ([facility] [address] 2017    A. RICKY (3/20/17): Right 0.58, Left 0.56.  Vitamin D deficiency 2018       Past Surgical History:   Procedure Laterality Date    COLONOSCOPY N/A 2017    COLONOSCOPY performed by Irene Tello. Michelene Kehr, MD at [address] COLONOSCOPY N/A 2018    COLONOSCOPY performed by Irene Tello.  Michelene Kehr, MD at [address] HX COLONOSCOPY      [facility] 2017    HX GI      colonoscopy x 3 - last 2017 - polyp    HX HEART CATHETERIZATION      3 heart stents    HX OTHER SURGICAL  2017    AICD    HX PACEMAKER      AICD           MEDICATIONS:     Current Outpatient Prescriptions   Medication Sig Dispense Refill    sacubitril-valsartan (ENTRESTO) 49 mg/51 mg tablet Take 1 Tab by mouth two (2) times a day. Indications: chronic heart failure 60 Tab 1    metoprolol succinate (TOPROL XL) 50 mg XL tablet Take 1 Tab by mouth daily for 360 days. Indications: chronic heart failure 30 Tab 3    aspirin 81 mg chewable tablet Take 81 mg by mouth daily.  insulin NPH/insulin regular (NOVOLIN 70/30) 100 unit/mL (70-30) injection 10 Units by SubCUTAneous route Daily (before breakfast).  insulin NPH/insulin regular (NOVOLIN 70/30) 100 unit/mL (70-30) injection 8 Units by SubCUTAneous route Daily (before dinner).  bumetanide (BUMEX) 1 mg tablet Take 0.5 mg by mouth as needed (fluid retention).  acetaminophen (TYLENOL EXTRA STRENGTH) 500 mg tablet Take 1,000 mg by mouth every six (6) hours as needed for Pain. Indications: BACK PAIN      atorvastatin (LIPITOR) 40 mg tablet TAKE ONE TABLET BY MOUTH ONCE DAILY  Indications: hyperlipidemia 90 Tab 1    cholecalciferol (VITAMIN D3) 1,000 unit tablet Take 2 Tabs by mouth daily.  spironolactone (ALDACTONE) 25 mg tablet Take 1 Tab by mouth daily. 90 Tab 1    DULoxetine (CYMBALTA) 30 mg capsule Take 1 Cap by mouth daily. (Patient taking differently: Take 30 mg by mouth daily (with dinner). ) 90 Cap 3    amiodarone (CORDARONE) 200 mg tablet Take 1 Tab by mouth daily. Indications: PREVENTION OF RECURRENT ATRIAL FIBRILLATION 30 Tab 6    patiromer calcium sorbitex (VELTASSA) 8.4 gram powder Take 8.4 g by mouth daily. 4 Packet 0    Insulin Syringes, Disposable, 1 mL syrg Pt to take 10 units w/ breakfast and 8 units w/ dinner 500 Syringe 1       Allergies   Allergen Reactions    Heparin (Porcine) Other (comments)     Was told when in the hospital that if he received heparin again that it would be deadly.              SOCIAL HISTORY:     Social History   Substance Use Topics    Smoking status: Current Some Day Smoker     Packs/day: 0.50     Years: 45.00     Last attempt to quit: 2017    Smokeless tobacco: Never Used      Comment: using Nicotine patches    Alcohol use No         FAMILY HISTORY:     Family History   Problem Relation Age of Onset    Hypertension Mother     Heart Disease Mother      MURMUR    Cancer Father      COLON    Colon Cancer Father     Other Sister      born totally handicapped/epileptic    Kidney Disease Sister       from renal shutdown    Heart Disease Brother     Other Brother      ?pancreatic cancer or ?pancreatitis    Cancer Maternal Uncle      toes and spread    Cancer Paternal Uncle      stomach    Heart Attack Maternal Grandfather 47    Cancer Paternal Grandfather      bone    Cancer Maternal Uncle      stomach    Cancer Maternal Uncle      lung    Anesth Problems Neg Hx                REVIEW OF SYMPTOMS:      Review of Symptoms:  Constitutional: Negative for fever, chills  HEENT: Negative for nosebleeds, tinnitus, and vision changes. Respiratory: Negative for cough, wheezing  Cardiovascular: Negative for orthopnea, syncope, and PND. Gastrointestinal: Negative for abdominal pain, diarrhea, melena. Genitourinary: Negative for dysuria  Musculoskeletal: + leg pain when walking long distance   Skin: Negative for rash  Heme: No problems bleeding. Neurological: Negative for speech change and focal weakness.               PHYSICAL EXAM:      Physical Exam:  Visit Vitals    /90    Pulse 60    Ht 5' 10\" (1.778 m)    Wt 208 lb (94.3 kg)    BMI 29.84 kg/m2       Patient appears generally well, mood and affect are appropriate and pleasant. HEENT:  Hearing intact, non-icteric, normocephalic, atraumatic. Neck Exam: Supple, No JVD  Lung Exam: Clear to auscultation, even breath sounds. Cardiac Exam: Regular rate and rhythm with no murmur  Abdomen: Soft, non-tender. Obese   Extremities: Moves all ext well. No lower extremity edema.   Psych: Appropriate affect  Neuro - Grossly intact           LABS / OTHER STUDIES:      Lab Results Component Value Date/Time    Sodium 142 09/20/2018 12:00 AM    Potassium 4.0 09/20/2018 12:00 AM    Chloride 101 09/20/2018 12:00 AM    CO2 20 09/20/2018 12:00 AM    Anion gap 6 03/08/2018 02:42 PM    Glucose 193 (H) 09/20/2018 12:00 AM    BUN 20 09/20/2018 12:00 AM    Creatinine 1.54 (H) 09/20/2018 12:00 AM    BUN/Creatinine ratio 13 09/20/2018 12:00 AM    GFR est AA 54 (L) 09/20/2018 12:00 AM    GFR est non-AA 47 (L) 09/20/2018 12:00 AM    Calcium 9.4 09/20/2018 12:00 AM    Bilirubin, total 0.4 09/06/2018 02:32 PM    AST (SGOT) 14 09/06/2018 02:32 PM    Alk. phosphatase 59 09/06/2018 02:32 PM    Protein, total 6.9 09/06/2018 02:32 PM    Albumin 4.4 09/06/2018 02:32 PM    Globulin 4.4 (H) 02/22/2017 03:16 AM    A-G Ratio 1.8 09/06/2018 02:32 PM    ALT (SGPT) 10 09/06/2018 02:32 PM     Lab Results   Component Value Date/Time    Cholesterol, total 232 (H) 09/06/2018 02:32 PM    HDL Cholesterol 32 (L) 09/06/2018 02:32 PM    LDL, calculated 142 (H) 09/06/2018 02:32 PM    VLDL, calculated 58 (H) 09/06/2018 02:32 PM    Triglyceride 288 (H) 09/06/2018 02:32 PM    CHOL/HDL Ratio 6.3 (H) 01/19/2017 03:50 AM     Lab Results   Component Value Date/Time    TSH 3.040 12/04/2017 10:17 AM                CARDIAC DIAGNOSTICS:      Cardiac Evaluation Includes:     A.  Echo (1/19/17):  EF 5-10% with severe GHK,.  Mildly dil LA.  Mild TR.  PASP 46. Harolyn Ruben (1/20/17):  LM ost70.  LAD m50.  D1 80.  LCx d70; OM1 99, OM2 90.  RCA p100.  No AVG.  PAP  53/27/36. C.  PCI (2/9/17): pRCA 2.5x38 Xience + 2.75x12 Xience.  ostLM 4.0x12 Xience post-dil with 4.5x12 NC. Theron Escobar (2/13/17):  EF 10% with severe GHK, PSM.  Dil LA.  Mod MR.  Mild to mod TR.  Left pleural effusion. Radha Tejal (5/15/17): EF 25% with severe GHK and basl-mid inf AK, mildly dil LV, DD.  Sev dil LA.  Mod MR.  Mild TR.  PASP 35. [facility] Anup[facility] [employer]. Echo 7/11/18 - LV mildly dilated. LVEF 25%. Akinesis of the basal-mid inferior wall(s).  Grade 1 diastolic dysfunction. Mild-mod LAE. Mild MR. PASP 42. Sinus of valsalva was 4.3 cm, STJ was 3.4 and ascending aorta was 3.6 cm.            ASSESSMENT AND PLAN:      Assessment and Plan:  1) Chronic systolic heart failure (EF 25%),   - ischemic cardiomyopathy, NYHA class II symptoms   - Continue coreg, entresto, aldactone (resume when he can)  - Prn bumex  - On 9/27/18, will increase Entresto to 97/103 BID - he is to see Dr. Mary Cordova in a couple of weeks       2) CAD s/p PCI  - Cont Coreg and statin   - denies anginal complaints   - cont ASA (he has stopped plavix due to cost - will keep him off of it going forward)       3) CKD      4) PAF  - remains SR  - On Amiodarone (asked him to resume when he can)   - Dr. Mandi Conner following   - No anticoag 2/2 GI bleed/AVMs      5) H/O lower GIB  - colonic polyp and AVM       6) Diabetes  - per PCP     7) Dyslipidemia  - cont statin      8) Med Non-compliance  - Had discussion about need for taking HF meds. He met with SW to assist with enrolling in Medicare Part D and Pt asst from [employer].    9) See me back in 6 months. Patient expressed understanding of the plan - questions were answered. Lives with his wife. Retired ().        Priscila Lewis MD, [address] [place] [address] Rd., [address]                                   [address]  Ph: [phone]                                                             Ph [phone]

## 2018-10-02 ENCOUNTER — TELEPHONE (OUTPATIENT)
Dept: CARDIOLOGY CLINIC | Age: 68
End: 2018-10-02

## 2018-10-02 NOTE — TELEPHONE ENCOUNTER
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 Explained he can not do this. Pt stated he understands.

## 2018-10-09 ENCOUNTER — OFFICE VISIT (OUTPATIENT)
Dept: CARDIOLOGY CLINIC | Age: 68
End: 2018-10-09

## 2018-10-09 VITALS
BODY MASS INDEX: 29.53 KG/M2 | OXYGEN SATURATION: 95 % | HEART RATE: 74 BPM | RESPIRATION RATE: 12 BRPM | SYSTOLIC BLOOD PRESSURE: 132 MMHG | WEIGHT: 205.8 LBS | DIASTOLIC BLOOD PRESSURE: 80 MMHG | TEMPERATURE: 97.6 F

## 2018-10-09 DIAGNOSIS — I73.9 CLAUDICATION (HCC): ICD-10-CM

## 2018-10-09 DIAGNOSIS — E11.21 TYPE 2 DIABETES MELLITUS WITH NEPHROPATHY (HCC): ICD-10-CM

## 2018-10-09 DIAGNOSIS — Z95.810 ICD (IMPLANTABLE CARDIOVERTER-DEFIBRILLATOR) IN PLACE: ICD-10-CM

## 2018-10-09 DIAGNOSIS — N18.30 CHRONIC RENAL IMPAIRMENT, STAGE 3 (MODERATE) (HCC): ICD-10-CM

## 2018-10-09 DIAGNOSIS — F17.200 TOBACCO DEPENDENCE SYNDROME: ICD-10-CM

## 2018-10-09 DIAGNOSIS — I25.5 ISCHEMIC CARDIOMYOPATHY: Primary | ICD-10-CM

## 2018-10-09 DIAGNOSIS — R06.02 SHORTNESS OF BREATH: ICD-10-CM

## 2018-10-09 DIAGNOSIS — I73.9 PERIPHERAL VASCULAR DISEASE (HCC): ICD-10-CM

## 2018-10-09 DIAGNOSIS — I48.0 PAROXYSMAL ATRIAL FIBRILLATION (HCC): ICD-10-CM

## 2018-10-09 DIAGNOSIS — I50.22 SYSTOLIC CHF, CHRONIC (HCC): ICD-10-CM

## 2018-10-09 DIAGNOSIS — Z91.14 NONCOMPLIANCE WITH MEDICATION REGIMEN: ICD-10-CM

## 2018-10-09 NOTE — MR AVS SNAPSHOT
[address] [place] [address] [address] 
[phone] Patient: Sendy Hayes. MRN: [id number] FVH:[id number]/[id number] Visit Information Date & Time Provider Department Dept. Phone Encounter #  
 10/9/2018 11:00 AM Loy Rodriguez Jen Love 3. [phone] [id number] Your Appointments 12/6/2018  1:20 PM  
ESTABLISHED PATIENT with Krys Hernandez MD  
[address] [facility]) Appt Note: 3mo fuv  
 [address] [address]  
[phone]  
  
   
 [address] [address]  
  
    
 3/29/2019 10:20 AM  
ESTABLISHED PATIENT with Savana Roberts MD  
[facility] ([facility]) Appt Note: 6 mo fu  
 [address] [facility] [id number] [phone]  
  
   
 [address] [facility] [address]  
  
    
 6/5/2019 10:15 AM  
PACEMAKER with PACEMAKER, STFRANCES  
[facility] ([facility] SCHEDULING) Appt Note: Yuliet sci ICD   STUDY   DONT MOVE CALL CECIL [phone]  
 [address] [facility] [id number] [phone]  
  
   
 [address] [place] [id number]  
  
    
  
 11/8/2018  1:00 PM  
REMOTE OFFICE VISIT with Roland Harding [facility] ([facility] SCHEDULING) Appt Note: Lat ICD   study [address] [place] [address] Upcoming Health Maintenance Date Due  
 FOOT EXAM Q1 11/23/1962 DTaP/Tdap/Td series (1 - Tdap) 11/23/1973 Shingrix Vaccine Age 50> (1 of 2) 11/23/2002 GLAUCOMA SCREENING Q2Y 11/23/2017 COLONOSCOPY 2/5/2019 Influenza Age 5 to Adult 3/31/2019* Pneumococcal 65+ Low/Medium Risk (2 of 2 - PPSV23) 11/15/2018 MICROALBUMIN Q1 12/4/2018 MEDICARE YEARLY EXAM 3/6/2019 HEMOGLOBIN A1C Q6M 3/6/2019 EYE EXAM RETINAL OR DILATED Q1 6/6/2019 LIPID PANEL Q1 9/6/2019 *Topic was postponed. The date shown is not the original due date. Allergies as of 10/9/2018  Review Complete On: 10/9/2018 By: JENI Sagastume Severity Noted Reaction Type Reactions Heparin (Porcine) High 02/07/2017    Other (comments) Was told when in the hospital that if he received heparin again that it would be deadly. Current Immunizations  Reviewed on 12/4/2017 Name Date Influenza Nasal Vaccine 11/15/2017 Pneumococcal Conjugate (PCV-13) 11/15/2017 Not reviewed this visit You Were Diagnosed With   
  
 Codes Comments Ischemic cardiomyopathy    -  Primary ICD-10-CM: I25.5 ICD-9-CM: 414.8 Systolic CHF, chronic (HCC)     ICD-10-CM: I50.22 ICD-9-CM: 428.22, 428.0 Paroxysmal atrial fibrillation (HCC)     ICD-10-CM: I48.0 ICD-9-CM: 427.31 Peripheral vascular disease ([facility] [address]     ICD-10-CM: I73.9 ICD-9-CM: 443. 9 Type 2 diabetes mellitus with nephropathy (HCC)     ICD-10-CM: E11.21 
ICD-9-CM: 250.40, 583.81 Chronic renal impairment, stage 3 (moderate) (HCC)     ICD-10-CM: N18.3 ICD-9-CM: 585.3 ICD (implantable cardioverter-defibrillator) in place     ICD-10-CM: Z95.810 ICD-9-CM: V45.02 Shortness of breath     ICD-10-CM: R06.02 
ICD-9-CM: 786.05 Tobacco dependence syndrome     ICD-10-CM: F17.200 ICD-9-CM: 305.1 Claudication [facility])     ICD-10-CM: I73.9 ICD-9-CM: 443.9 Noncompliance with medication regimen     ICD-10-CM: Z91.14 
ICD-9-CM: V15.81 Vitals BP Pulse Temp Resp Weight(growth percentile) SpO2  
 132/80 74 97.6 °F (36.4 °C) 12 205 lb 12.8 oz (93.4 kg) 95% BMI Smoking Status 29.53 kg/m2 Current Some Day Smoker BMI and BSA Data Body Mass Index Body Surface Area  
 29.53 kg/m 2 2.15 m 2 Preferred Pharmacy Pharmacy Name Phone [facility] [address] [phone] Your Updated Medication List  
  
   
This list is accurate as of 10/9/18 11:56 AM.  Always use your most recent med list.  
  
  
  
  
 amiodarone 200 mg tablet Commonly known as:  CORDARONE Take 1 Tab by mouth daily. Indications: PREVENTION OF RECURRENT ATRIAL FIBRILLATION  
  
 aspirin 81 mg chewable tablet Take 81 mg by mouth daily. atorvastatin 40 mg tablet Commonly known as:  LIPITOR  
TAKE ONE TABLET BY MOUTH ONCE DAILY  Indications: hyperlipidemia  
  
 bumetanide 1 mg tablet Commonly known as:  Seretha Martin Take 0.5 mg by mouth as needed (fluid retention). DULoxetine 30 mg capsule Commonly known as:  CYMBALTA Take 1 Cap by mouth daily. Insulin Syringes (Disposable) 1 mL Syrg Pt to take 10 units w/ breakfast and 8 units w/ dinner  
  
 metoprolol succinate 50 mg XL tablet Commonly known as:  TOPROL XL Take 1 Tab by mouth daily for 360 days. Indications: chronic heart failure * NovoLIN 70/30 U-100 Insulin 100 unit/mL (70-30) injection Generic drug:  insulin NPH/insulin regular 10 Units by SubCUTAneous route Daily (before breakfast). * NovoLIN 70/30 U-100 Insulin 100 unit/mL (70-30) injection Generic drug:  insulin NPH/insulin regular  
8 Units by SubCUTAneous route Daily (before dinner). patiromer calcium sorbitex 8.4 gram powder Commonly known as:  VELTASSA Take 8.4 g by mouth daily. sacubitril-valsartan  mg tablet Commonly known as:  ENTRESTO Take 1 Tab by mouth two (2) times a day. spironolactone 25 mg tablet Commonly known as:  ALDACTONE Take 1 Tab by mouth daily. TYLENOL EXTRA STRENGTH 500 mg tablet Generic drug:  acetaminophen Take 1,000 mg by mouth every six (6) hours as needed for Pain. Indications: BACK PAIN  
  
 VITAMIN D3 1,000 unit tablet Generic drug:  cholecalciferol Take 2 Tabs by mouth daily. * Notice: This list has 2 medication(s) that are the same as other medications prescribed for you. Read the directions carefully, and ask your doctor or other care provider to review them with you. We Performed the Following METABOLIC PANEL, BASIC [10094 CPT(R)] NT-PRO BNP [id number] CPT(R)] Patient Instructions You are doing OK Check with [facility] about the cost of plavix, lipitor, aldactone Take 1/2 tablet bumex today or tomorrow to see if your bloating/breathing gets better, OK to take on as needed basis. If you have to take bumex > 2x/week- call us so that we can monitor your labs. CONTINUE CURRENT medications Take twice a day medications 12 hours apart with food Labs today BMP, proBNP Decrease smoking by 1/2 again See Dr. Oren Shelton Phone: [phone]; 
See Dr. Allison Ford about diabetes Limit WHITE foods ( flour, sugar, pasta, rice, potatoes) Walk more Keep a positive attitude Get Flu shot HF Education: Continue daily weights (in the morning, after voiding). Notify HF team of overnight weight gains > 2 lbs or weekly >5 lbs or if any of the following Sx. Continue to limit sodium intake & monitor your fluid intake . Follow up 1 month, sooner if needed Introducing [facility]! [facility] introduces [employer] patient portal. Now you can access parts of your medical record, email your doctor's office, and request medication refills online. 1. In your internet browser, go to [email or web address] 2. Click on the First Time User? Click Here link in the Sign In box. You will see the New Member Sign Up page. 3. Enter your [employer] Access Code exactly as it appears below. You will not need to use this code after youve completed the sign-up process. If you do not sign up before the expiration date, you must request a new code. · [employer] Access Code: [email or web address] Expires: 12/5/2018  2:28 PM 
 
 4. Enter the last four digits of your Social Security Number (xxxx) and Date of Birth (mm/dd/yyyy) as indicated and click Submit. You will be taken to the next sign-up page. 5. Create a [employer] ID. This will be your [employer] login ID and cannot be changed, so think of one that is secure and easy to remember. 6. Create a [employer] password. You can change your password at any time. 7. Enter your Password Reset Question and Answer. This can be used at a later time if you forget your password. 8. Enter your e-mail address. You will receive e-mail notification when new information is available in [address] 9. Click Sign Up. You can now view and download portions of your medical record. 10. Click the Download Summary menu link to download a portable copy of your medical information. If you have questions, please visit the Frequently Asked Questions section of the [employer] website. Remember, [employer] is NOT to be used for urgent needs. For medical emergencies, dial 911. Now available from your iPhone and Android! Please provide this summary of care documentation to your next provider. Your primary care clinician is listed as MICHEAL BALLRAD. If you have any questions after today's visit, please call [phone].

## 2018-10-09 NOTE — PATIENT INSTRUCTIONS
You are doing OK   Check with [facility] about the cost of plavix, lipitor, aldactone    Take 1/2 tablet bumex today or tomorrow to see if your bloating/breathing gets better, OK to take on as needed basis. If you have to take bumex > 2x/week- call us so that we can monitor your labs. CONTINUE CURRENT medications    Take twice a day medications 12 hours apart with food    Labs today BMP, proBNP     Decrease smoking by 1/2 again    See Dr. Murphy [facility] Phone: [phone];  See Dr. Norah Granados about diabetes     Limit WHITE foods ( flour, sugar, pasta, rice, potatoes)    Walk more    Keep a positive attitude     Get Flu shot    HF Education: Continue daily weights (in the morning, after voiding). Notify HF team of overnight weight gains > 2 lbs or weekly >5 lbs or if any of the following Sx. Continue to limit sodium intake & monitor your fluid intake .     Follow up 1 month, sooner if needed

## 2018-10-09 NOTE — LETTER
10/9/2018 12:01 PM 
 
Patient:  Bran Venegas. YOB: 1950 Date of Visit: 10/9/2018 Dear Main Yanez MD 
[address] [address] [facility] [address] VIA In Basket Macho Pina MD 
[facility] [address] [address] VIA Facsimile: [phone] Birdia Ganser, MD 
[address] [facility] [address] [address] VIA In Basket Tiffanie Juarez MD 
[address] [facility] [address] [address] VIA In Basket Jose Eduardo Esqueda MD 
[facility] [address] [address] VIA Facsimile: [phone] 
 : 
 
 
Thank you for referring Mr. Eva Razo to me for evaluation/treatment. Below are the relevant portions of my assessment and plan of care. If you have questions, please do not hesitate to call me. I look forward to following Mr. Wendi Willis along with you. Sincerely, JENI Meier

## 2018-10-09 NOTE — PROGRESS NOTES
[facility]  Note      DOS:  10/9/2018     PRIMARY CARE PHYSICIAN: Shalonda Cobb MD  CARDIOLOGIST: Dr. Leopoldo Zelaya   EP: Lamarr Schaumann, MD     Impression/Plan:   Chronic systolic heart failure (EF 25%), d/t ICM, ACC/AHA , NYHA class IIb   Mild increased volume today- recommend he take bumex 0.5 mg x 1 today or tomorrow and then prn            Continue the Entresto 97/103 mg BID             Continue Toprol XL 50mg daily            Hold on aldactone for now hx hyperkalemia while on GDMT,  and not taking valtessa             Labs today    Cardiac rehab is still recommended and will be beneficial for the PAD   Continue daily weights, sodium and fluid restriction   Follow  Up in clinic in 1 months, sooner for problems    Encouraged to stop smoking- decrease by 1/2    Labs today       PAD- charles showed significant PAD, not amenable to  PTCA and the recommendations are for vascular surgery.  He would like to see [facility]    Refer to  Dr. Joe Velasquez as pt  would like a surgeon at [facility]      Encouraged to exercise and consider cardiac rehab   Smoking cessation   Encouraged to resume DAPT, statin -once medicare part D kicks in        CAD s/p PCI- he has seen Dr. Mamta Andre    Continue ASA, BB             Off plavix and statin 2/2 no part D      PAF-- remains SR- Now off Amiodarone     Cont BB   Dr Hugo Renee to follow    No anticoag 2/2 GI bleed/AVMs    Recurrent tobacco use he has decreased smoking to < 1 ppd   Counseled on smoking cessation   Nicoderm patch       Incomplete LBBB  msec    CKD- Cr better  1.54   Monitor    Suspect KELLY and COPD            Encouraged him to see pulmonary for eval of COPD and sleep study-   Proventil  Inhaler prn -     Diabetes recent A1c 7.1    Insulin management by PCP            Consider Jardiance given his CAD- defer to Dr. Cristin Donohue- per PCP/cardiology     H/O lower GIB/s/p colorectal surgery negative genetics     Paraesthesias 2/2 DM, PAD     Med Non-compliance       Pt asst from [employer] Encouraged to check on med cost at [facility]               Thank you for allowing me to participate in the care of this delightful  gentleman. Please do not hesitate to call with any questions. Opal Barrientos  RN, ACNP-BC, [facility]       Chief Complaint   Patient presents with    Follow Up Chronic Condition     HPI: Elzbieta Richardson. is a 74y.o. year old  male with a history of chronic HFrEF (EF 25%) secondary to ICM- CAD s/p PCI of LM,  of RCA s/p AICD complicated by  PAF (amiodarone) , Hep C, GI bleed (s/p colorectal surgery), and remote tobacco abuse. He presents to clinic today for follow up of his heart failure. He was last seen 1  months ago at which time Shirley Tran was resumed. His last ECHO  7/2018 showed : LVD, EF 25%, severe GHK, AK  basal-mid inferior wall. G1DD, mild- mod LAE, mild MR/TR, RVSP 42 mmHg. AO root dilation 4.3. CATH: (1/20/17): LM ost70. LAD m50. D1 80. LCx d70; OM1 99, OM2 90. RCA p100. No AVG. PAP 53/27/36. s/p PCI (2/9/17): pRCA 2.5x38 Xience + 2.75x12 Xience. ostLM 4.0x12 Xience post-dil with 4.5x12 NC. CARDIAC MRI 1/31/17:LVEF 10%, LVH, RV dilated RVEF 25% GHK, marked LAE, mod CB, mild MR, mod-severe TR       He saw Dr Ludy Torres 2 weeks ago and Entreso increased to 97/103 mg BID. He is not taking several of his meds due to cost and awaiting Medicare part D. He has decreased his smoking to < 1 ppd. Notes more activity limiting  claudication and has would like to see a vascular surgeon at [facility]. Mr. Joshua Young. Reports stable NAVARRO with moderate exertion, stair climbing, (-) benopnea, never tested for KELLY- declined. Denies cough or wheeze. He sleeps well, 8-10 hours, denies  Orthopnea, PND + Nocturia x 1, (+)  Snoring -  Naps ~ 3 times per week. Notes mild abdominal bloating, not taking bumex. He denies LE edema. Getting flu shot this week. Fatigue waxes and wanes.  Weight stable at home 205, diuresing with Entresto. Good, tries to eat in moderation. Does not add salt, eats out 3-4 times per week. Denies early satiety, nausea,        BP at home 140/85    Denies exertional chest pain, palpitations, lightheadedness, dizziness, syncope, near syncope, AICD shock, bleeding. Fernanda Kern.  lives with his wife. Nickname is Edwar. He recently welcomed his 7th great grandchild. He is retired from industrial pipe welding. He smoked 50 years- quit 1/2017, and has resumed. occasional etoh. He enjoys old movies, visiting with is 2 adult children and grand children. 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 Review of Systems: :    General:  Positive for fatigue, Denies fever, chills, negative  HEENT: Denies epistaxis, or angioedema   Pulmonary: Denies cough, wheeze   Cardiac: Per HPI   GI:  Denies N/V, abdominal pain, no bleeding or dark stool    Musculo: Arthritis, some neck pain  Neuro: + paraesthesia in feet-, Denies TIA or CVA sx, sz,    Skin:  negative      CARDIAC EVALUATION   ECHO (2/13/17): EF 10% severe GHK, PSM. Dil LA. Mod MR. Mild to mod TR. Left pleural effusion  ECHO (1/19/17): EF 5-10% with severe GHK,. Mildly dil LA. Mild TR. PASP 46. ECHO: (5/15/17) EF 25%, severe GHK, basal-mid inferior AK, severe LAE, Mod MR, Mild TR,  ECHO 7/11/2018: LVD, EF 25%, severe GHK, AK  basal-mid inferior  Wall. G1DD, mild- mod LAE, mild MR/TR, RVSP 42 mmHg. AO root dilation 4.3       CATH (1/20/17): LM ost70. LAD m50. D1 80. LCx d70; OM1 99, OM2 90. RCA p100. No AVG. PAP 53/27/36. --- s/p PCI (2/9/17): pRCA 2.5x38 Xience + 2.75x12 Xience. ostLM 4.0x12 Xience post-dil with 4.5x12 NC. ICD (6/12/17, Anup): [employer] single lead    CARDIAC MRI 1/31/17:LVEF 10%, LVH, RV dilated RVEF 25% GHK, marked LAE, mod CB, mild MR, mod-severe TR, The entire LAD territory : largely viable and should recover upon  revascularization.  The entire RCA territory is largely viable and should recover  upon revascularization. The LCx territory demonstrate medium-size infarct with  limited viability. Large left-sided pleural effusion with passive atelectasis of the left lung. RICKY (3/20/17): R: 0.58, L: 0.56    LE arteriogm 6/2018: bilateral occluded SFA      EKG 5/3/18: SR,  ms, QTc 449 ms     History:  Past Medical History:   Diagnosis Date    Arthritis     hands/fingers    CAD (coronary artery disease)     involving native coronary artery of native heart without angina pectoris    Cardiomyopathy ([facility] [address] 01/20/2017    A. Echo (1/19/17):  EF 5-10% with severe GHK,. Mildly dil LA. Mild TR. PASP 46./systemic cardiomyopathy    Chronic obstructive pulmonary disease (HCC)     Chronic renal insufficiency 03/06/2017    Congestive heart failure ([facility] [address]     Diabetes mellitus ([facility] [address] 3/6/2017    IDDM    Dyslipidemia 3/6/2017    A. FLP (1/19/17): Tot 120, , HDL 19, LDL 76 (no Rx).  H/O: GI bleed 3/6/2017    Hepatitis C 3/6/2017    Hypokalemia     ICD (implantable cardioverter-defibrillator) in place     Ill-defined condition     flu 1/2018     Noncompliance with medication regimen 9/11/2018    Paroxysmal atrial fibrillation ([facility] [address] 3/6/2017    Peripheral vascular disease ([facility] [address] 9/18/2017    A. RICKY (3/20/17): Right 0.58, Left 0.56.  Vitamin D deficiency 6/4/2018     Past Surgical History:   Procedure Laterality Date    COLONOSCOPY N/A 2/17/2017    COLONOSCOPY performed by Walker James. Ravi Peralta MD at [address] COLONOSCOPY N/A 2/5/2018    COLONOSCOPY performed by Walker James. Ravi Peralta MD at [facility] ENDOSCOPY    HX CORONARY STENT PLACEMENT      LM, RCA x 2     HX GI      colonoscopy x 3 - last 2/2017 - polyp    HX IMPLANTABLE CARDIOVERTER DEFIBRILLATOR       Social History     Social History    Marital status:      Spouse name: N/A    Number of children: N/A    Years of education: N/A     Occupational History    Not on file.      Social History Main Topics    Smoking status: Current Some Day Smoker     Packs/day: 0.50     Years: 45.00     Last attempt to quit: 2017    Smokeless tobacco: Never Used      Comment: using Nicotine patches    Alcohol use No    Drug use: Yes     Special: Marijuana    Sexual activity: No     Other Topics Concern    Not on file     Social History Narrative     Family History   Problem Relation Age of Onset    Hypertension Mother     Heart Disease Mother      MURMUR    Cancer Father      COLON    Colon Cancer Father     Other Sister      born totally handicapped/epileptic    Kidney Disease Sister       from renal shutdown    Heart Disease Brother     Other Brother      ?pancreatic cancer or ?pancreatitis    Cancer Maternal Uncle      toes and spread    Cancer Paternal Uncle      stomach    Heart Attack Maternal Grandfather 47    Cancer Paternal Grandfather      bone    Cancer Maternal Uncle      stomach    Cancer Maternal Uncle      lung    Anesth Problems Neg Hx        Current Medications:   Current Outpatient Prescriptions   Medication Sig Dispense Refill    sacubitril-valsartan (ENTRESTO) 97 mg/103 mg tablet Take 1 Tab by mouth two (2) times a day. 180 Tab 3    metoprolol succinate (TOPROL XL) 50 mg XL tablet Take 1 Tab by mouth daily for 360 days. Indications: chronic heart failure 30 Tab 3    aspirin 81 mg chewable tablet Take 81 mg by mouth daily.  insulin NPH/insulin regular (NOVOLIN 70/30) 100 unit/mL (70-30) injection 10 Units by SubCUTAneous route Daily (before breakfast).  insulin NPH/insulin regular (NOVOLIN 70/30) 100 unit/mL (70-30) injection 8 Units by SubCUTAneous route Daily (before dinner).  acetaminophen (TYLENOL EXTRA STRENGTH) 500 mg tablet Take 1,000 mg by mouth every six (6) hours as needed for Pain.  Indications: BACK PAIN      Insulin Syringes, Disposable, 1 mL syrg Pt to take 10 units w/ breakfast and 8 units w/ dinner 500 Syringe 1    atorvastatin (LIPITOR) 40 mg tablet TAKE ONE TABLET BY MOUTH ONCE DAILY  Indications: hyperlipidemia 90 Tab 1    cholecalciferol (VITAMIN D3) 1,000 unit tablet Take 2 Tabs by mouth daily.  spironolactone (ALDACTONE) 25 mg tablet Take 1 Tab by mouth daily. 90 Tab 1    DULoxetine (CYMBALTA) 30 mg capsule Take 1 Cap by mouth daily. (Patient taking differently: Take 30 mg by mouth daily (with dinner). ) 90 Cap 3    amiodarone (CORDARONE) 200 mg tablet Take 1 Tab by mouth daily. Indications: PREVENTION OF RECURRENT ATRIAL FIBRILLATION 30 Tab 6    patiromer calcium sorbitex (VELTASSA) 8.4 gram powder Take 8.4 g by mouth daily. 4 Packet 0    bumetanide (BUMEX) 1 mg tablet Take 0.5 mg by mouth as needed (fluid retention). Allergies: Allergies   Allergen Reactions    Heparin (Porcine) Other (comments)     Was told when in the hospital that if he received heparin again that it would be deadly. Visit Vitals    /80    Pulse 74    Temp 97.6 °F (36.4 °C)    Resp 12    Wt 205 lb 12.8 oz (93.4 kg)    SpO2 95%    BMI 29.53 kg/m2       Physical Exam:     Constitutional:  A&O x 3, well developed, WM in NAD  -older than his stated age   HENT[de-identified]   Normocephalic and atraumatic. mucous membranes pink, moist   Neck:   Neck supple. No lymphadenopathy R carotid bruit   Cardiovascular:  PMI nondisplaced, AICD R infraclavicular space, RRR, S1 & S2, + S3,  2/6 systolic murmur at apex,   JVP ~12 cm, +  HJR. Pulmonary/Chest:  Effort normal, clear, no wheezing noted. .   Abdominal:   Soft. Mildly distended, non-tender, + bowel sounds, mild hepatomegally   Extremities:   Distal calf vascular changes, no edema, no cyanosis, feet warm , + femoral pulses, + bilateral femoral bruits, difficult to palpate distal pedal pulses    Neurological:  A&O x 3 nofocal deficits    Skin:    Skin is warm and dry. Psychiatric:   He has a normal mood and affect.        Vitals:    Visit Vitals    /80    Pulse 74    Temp 97.6 °F (36.4 °C)    Resp 12    Wt 205 lb 12.8 oz (93.4 kg)    SpO2 95%    BMI 29.53 kg/m2           Recent Labs:   Lab Results   Component Value Date/Time    WBC 7.8 03/08/2018 02:42 PM    HGB 13.8 03/08/2018 02:42 PM    HCT 42.0 03/08/2018 02:42 PM    PLATELET 619 72/88/6497 02:42 PM    MCV 87.0 03/08/2018 02:42 PM     Lab Results   Component Value Date/Time    TSH 3.040 12/04/2017 10:17 AM      Lab Results   Component Value Date/Time    BNP 1209 (H) 01/21/2017 12:25 PM    NT pro-BNP 6258 (H) 01/31/2017 04:52 AM    NT pro-BNP 6094 (H) 01/30/2017 04:16 AM    NT pro-BNP 6168 (H) 01/29/2017 04:18 AM    NT pro-BNP 6876 (H) 01/28/2017 03:38 AM    NT pro-BNP 5427 (H) 01/27/2017 03:56 AM    PROBNP 1086 (H) 09/20/2018 12:00 AM    PROBNP 1399 (H) 09/12/2018 02:30 PM    PROBNP 1907 (H) 07/05/2018 12:00 AM    PROBNP 1705 (H) 05/23/2018 12:00 AM    PROBNP 658 (H) 03/29/2018 12:00 AM      Lab Results   Component Value Date/Time    Sodium 142 09/20/2018 12:00 AM    Potassium 4.0 09/20/2018 12:00 AM    Chloride 101 09/20/2018 12:00 AM    CO2 20 09/20/2018 12:00 AM    Anion gap 6 03/08/2018 02:42 PM    Glucose 193 (H) 09/20/2018 12:00 AM    BUN 20 09/20/2018 12:00 AM    Creatinine 1.54 (H) 09/20/2018 12:00 AM    BUN/Creatinine ratio 13 09/20/2018 12:00 AM    GFR est AA 54 (L) 09/20/2018 12:00 AM    GFR est non-AA 47 (L) 09/20/2018 12:00 AM    Calcium 9.4 09/20/2018 12:00 AM    Bilirubin, total 0.4 09/06/2018 02:32 PM    ALT (SGPT) 10 09/06/2018 02:32 PM    AST (SGOT) 14 09/06/2018 02:32 PM    Alk. phosphatase 59 09/06/2018 02:32 PM    Protein, total 6.9 09/06/2018 02:32 PM    Albumin 4.4 09/06/2018 02:32 PM    Globulin 4.4 (H) 02/22/2017 03:16 AM    A-G Ratio 1.8 09/06/2018 02:32 PM      Lab Results   Component Value Date/Time    Hemoglobin A1c 7.1 (H) 09/06/2018 02:32 PM    Hemoglobin A1c (POC) 6.4 08/17/2017 11:00 AM          I have discussed the diagnosis with  Joel Blackburn. and the intended plan as seen in the above orders.   Questions were answered concerning future plans, and written instructions provided. I have discussed medication side effects and warnings with the patient as well. Thank you for allowing me to participate in the care of this delightful gentleman. Please do not hesitate to call with any questions. Opal Franco  RN, ACNP-BC, [address] [id number] [phone]  24 hour VAD/HF Pager: [phone]

## 2018-10-10 LAB
BUN SERPL-MCNC: 25 MG/DL (ref 8–27)
BUN/CREAT SERPL: 19 (ref 10–24)
CALCIUM SERPL-MCNC: 9.1 MG/DL (ref 8.6–10.2)
CHLORIDE SERPL-SCNC: 104 MMOL/L (ref 96–106)
CO2 SERPL-SCNC: 21 MMOL/L (ref 20–29)
CREAT SERPL-MCNC: 1.29 MG/DL (ref 0.76–1.27)
GLUCOSE SERPL-MCNC: 155 MG/DL (ref 65–99)
NT-PROBNP SERPL-MCNC: 1277 PG/ML (ref 0–376)
POTASSIUM SERPL-SCNC: 4.3 MMOL/L (ref 3.5–5.2)
SODIUM SERPL-SCNC: 143 MMOL/L (ref 134–144)

## 2018-10-10 NOTE — PROGRESS NOTES
Pt notified,   Labs stable  Continue Current plan   Call for any problems or questions related to his HF>  Pt verbalized understanding   Thank you

## 2018-11-09 ENCOUNTER — TELEPHONE (OUTPATIENT)
Dept: CARDIOLOGY CLINIC | Age: 68
End: 2018-11-09

## 2018-11-09 NOTE — TELEPHONE ENCOUNTER
Left message for patient to call. Will need to do a manual transmission on the [facility] home monitoring unit since the remote ICD report did not come through.

## 2018-11-15 ENCOUNTER — TELEPHONE (OUTPATIENT)
Dept: CARDIOLOGY CLINIC | Age: 68
End: 2018-11-15

## 2018-11-15 NOTE — TELEPHONE ENCOUNTER
Call placed to patient to reschedule his missed appointment on November 6th.     Patient is scheduled w/ Kris Hagen NP on Monday November 19th at 3pm.

## 2018-11-19 ENCOUNTER — OFFICE VISIT (OUTPATIENT)
Dept: CARDIOLOGY CLINIC | Age: 68
End: 2018-11-19

## 2018-11-19 VITALS
HEART RATE: 78 BPM | SYSTOLIC BLOOD PRESSURE: 150 MMHG | BODY MASS INDEX: 29.2 KG/M2 | RESPIRATION RATE: 20 BRPM | WEIGHT: 204 LBS | HEIGHT: 70 IN | OXYGEN SATURATION: 98 % | DIASTOLIC BLOOD PRESSURE: 90 MMHG

## 2018-11-19 DIAGNOSIS — N18.30 CHRONIC RENAL IMPAIRMENT, STAGE 3 (MODERATE) (HCC): ICD-10-CM

## 2018-11-19 DIAGNOSIS — R06.09 DOE (DYSPNEA ON EXERTION): ICD-10-CM

## 2018-11-19 DIAGNOSIS — I73.9 PERIPHERAL VASCULAR DISEASE (HCC): ICD-10-CM

## 2018-11-19 DIAGNOSIS — F17.200 TOBACCO DEPENDENCE SYNDROME: ICD-10-CM

## 2018-11-19 DIAGNOSIS — G62.9 NEUROPATHY: ICD-10-CM

## 2018-11-19 DIAGNOSIS — I25.5 ISCHEMIC CARDIOMYOPATHY: Primary | ICD-10-CM

## 2018-11-19 DIAGNOSIS — I50.22 SYSTOLIC CHF, CHRONIC (HCC): ICD-10-CM

## 2018-11-19 DIAGNOSIS — Z91.14 NONCOMPLIANCE WITH MEDICATION REGIMEN: ICD-10-CM

## 2018-11-19 DIAGNOSIS — I25.10 CORONARY ARTERY DISEASE INVOLVING NATIVE CORONARY ARTERY OF NATIVE HEART WITHOUT ANGINA PECTORIS: ICD-10-CM

## 2018-11-19 DIAGNOSIS — I73.9 CLAUDICATION (HCC): ICD-10-CM

## 2018-11-19 DIAGNOSIS — E87.6 HYPOKALEMIA: ICD-10-CM

## 2018-11-19 RX ORDER — METOPROLOL SUCCINATE 100 MG/1
100 TABLET, EXTENDED RELEASE ORAL DAILY
Qty: 90 TAB | Refills: 1 | Status: SHIPPED | OUTPATIENT
Start: 2018-11-19 | End: 2019-04-02 | Stop reason: SDUPTHER

## 2018-11-19 NOTE — PROGRESS NOTES
[facility]  Note      DOS:  11/19/2018     PRIMARY CARE PHYSICIAN: Young Galarza MD  CARDIOLOGIST: Dr. Ish Lester   EP: Mary Kate Cabello MD       Impression/Plan:    Chronic systolic heart failure (EF 25%), d/t ICM, ACC/AHA , NYHA class IIb            Continue the Entresto 97/103 mg BID             Increase  Toprol  mg daily            Hold on aldactone for now hx hyperkalemia while on GDMT, and not taking valtessa             Prn bumex   Labs today    Cardiac rehab is still recommended and will be beneficial for the PAD   Continue daily weights, sodium and fluid restriction   Follow  Up in clinic in 2 weeks, sooner for problems    Encouraged to stop smoking- decrease by 1/2        HTN BP sub-opitmal today and diastolic elevated at home    Entresto   Increase TOPROL XL to 100 mg daily    PAD- charles showed significant PAD,not amenable to  PTCA and the recommendations are for vascular surgery.  He would like to see a vascular surgeon at . [facility]    Refer to  Dr. Olivia Reid or Dr. Elvis Ramirez as pt  would like a surgeon at [facility]      Encouraged to exercise and consider cardiac rehab   Smoking cessation   Encouraged to resume DAPT, statin -once medicare part D kicks in    CAD s/p PCI- he has seen Dr. Ross Cheatham    Continue ASA, BB             Off plavix and statin 2/2 no part D  - encouraged to see [employer],  [employer], or use cover my meds      PAF-- remains SR- Now off Amiodarone     Cont BB   Dr Mock Points to follow    No anticoag 2/2 GI bleed/AVMs    Recurrent tobacco use he has decreased smoking to < 1 ppd   Counseled on smoking cessation   Nicoderm patch       Incomplete LBBB  msec    CKD- Cr better  1.29   Monitor    Suspect KELLY and COPD            Encouraged him to see pulmonary for eval of COPD and sleep study-   Proventil  Inhaler prn -     Diabetes recent A1c 7.1    Insulin management by PCP            Consider Jardiance given his CAD- defer to Dr. Daya Patel- per PCP/cardiology H/O lower GIB/s/p colorectal surgery negative genetics     Paraesthesias/neuropathy 2/2 DM, PAD    Ck B12 and folate / ?vitamin B deficiency     Med Non-compliance       Pt asst from [employer] Encouraged to check on med cost at [place]              Thank you for allowing me to participate in the care of this delightful  gentleman. Please do not hesitate to call with any questions. Opal House RN, ACNP-BC, [facility]       Chief Complaint   Patient presents with    Follow-up    Fatigue     HPI: Alton Mcpherson is a 74y.o. year old  male with a history of chronic HFrEF (EF 25%) secondary to ICM- CAD s/p PCI of LM,  of RCA s/p AICD complicated by  PAF (amiodarone) , Hep C, GI bleed (s/p colorectal surgery), and remote tobacco abuse. He presents to clinic today for follow up of his heart failure. He was last seen 1  month ago. His last ECHO  7/2018 showed : LVD, EF 25%, severe GHK, AK  basal-mid inferior wall. G1DD, mild- mod LAE, mild MR/TR, RVSP 42 mmHg. AO root dilation 4.3. CATH: (1/20/17): LM ost70. LAD m50. D1 80. LCx d70; OM1 99, OM2 90. RCA p100. No AVG. PAP 53/27/36. s/p PCI (2/9/17): pRCA 2.5x38 Xience + 2.75x12 Xience. ostLM 4.0x12 Xience post-dil with 4.5x12 NC. CARDIAC MRI 1/31/17:LVEF 10%, LVH, RV dilated RVEF 25% GHK, marked LAE, mod CB, mild MR, mod-severe TR       He has been compliant with Entresto and Torpol XL, he remains off of aldactone and valtessa. Mr. Hipolito Jack. reports stable NAVARRO with moderate exertion, stair climbing. Fatigue waxes and wanes. His activity is mostly limited by claudication. He continues to smoke  < 1 ppd. Notes more activity limiting  claudication and  would like to see a vascular surgeon at [facility].- waiting for Medicare. He sleeps well, 8-10 hours,   + Nocturia x 1, (+)  Snoring -  Naps ~ 3 times per week. He has lost 10 # according to his home scale. He is diuresing with Entresto. He tries  to eat in moderation. Does not add salt, eats out 3-4 times per week. BP at home 130-135/85-90         Shahzad Arguelles (Spud) lives with his wife. He recently welcomed his 7th great grandchild. He is retired from industrial pipe welding. He smoked 50 years- quit 1/2017, and has resumed. occasional etoh. He enjoys old movies, visiting with is 2 adult children and grand children. 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 Review of Systems: :    General:  Positive for fatigue, Denies fever, chills, negative  HEENT: Denies epistaxis, or angioedema   Pulmonary: Denies cough, wheeze   Cardiac: Denies exertional chest pain, palpitations, lightheadedness, dizziness, syncope, near syncope, AICD shock, bleeding. GI:  Denies N/V, abdominal pain,early satiety,  no bleeding or dark stool    Musculo: Arthritis, some neck pain, myalgias from working on his car this weekend   Neuro: + paraesthesia in feet-, Denies TIA or CVA sx, sz,    Skin:  negative      CARDIAC EVALUATION   ECHO (2/13/17): EF 10% severe GHK, PSM. Dil LA. Mod MR. Mild to mod TR. Left pleural effusion  ECHO (1/19/17): EF 5-10% with severe GHK,. Mildly dil LA. Mild TR. PASP 46. ECHO: (5/15/17) EF 25%, severe GHK, basal-mid inferior AK, severe LAE, Mod MR, Mild TR,  ECHO 7/11/2018: LVD, EF 25%, severe GHK, AK  basal-mid inferior  Wall. G1DD, mild- mod LAE, mild MR/TR, RVSP 42 mmHg. AO root dilation 4.3       CATH (1/20/17): LM ost70. LAD m50. D1 80. LCx d70; OM1 99, OM2 90. RCA p100. No AVG. PAP 53/27/36. --- s/p PCI (2/9/17): pRCA 2.5x38 Xience + 2.75x12 Xience. ostLM 4.0x12 Xience post-dil with 4.5x12 NC. ICD (6/12/17, Anup): [employer] single lead    CARDIAC MRI 1/31/17:LVEF 10%, LVH, RV dilated RVEF 25% GHK, marked LAE, mod CB, mild MR, mod-severe TR, The entire LAD territory : largely viable and should recover upon  revascularization.  The entire RCA territory is largely viable and should recover  upon revascularization. The LCx territory demonstrate medium-size infarct with  limited viability. Large left-sided pleural effusion with passive atelectasis of the left lung. RICKY (3/20/17): R: 0.58, L: 0.56    LE arteriogm 6/2018: bilateral occluded SFA      EKG 5/3/18: SR,  ms, QTc 449 ms     History:  Past Medical History:   Diagnosis Date    Arthritis     hands/fingers    CAD (coronary artery disease)     involving native coronary artery of native heart without angina pectoris    Cardiomyopathy ([facility] 01/20/2017    A. Echo (1/19/17):  EF 5-10% with severe GHK,. Mildly dil LA. Mild TR. PASP 46./systemic cardiomyopathy    Chronic obstructive pulmonary disease (HCC)     Chronic renal insufficiency 03/06/2017    Claudication ([facility] 10/9/2018    Congestive heart failure ([facility]     Diabetes mellitus ([facility] 3/6/2017    IDDM    Dyslipidemia 3/6/2017    A. FLP (1/19/17): Tot 120, , HDL 19, LDL 76 (no Rx).  H/O: GI bleed 3/6/2017    Hepatitis C 3/6/2017    Hypokalemia     ICD (implantable cardioverter-defibrillator) in place     Ill-defined condition     flu 1/2018     Noncompliance with medication regimen 9/11/2018    Paroxysmal atrial fibrillation ([facility] 3/6/2017    Peripheral vascular disease ([facility] 9/18/2017    A. RICKY (3/20/17): Right 0.58, Left 0.56.     Tobacco dependence syndrome 10/9/2018    Vitamin D deficiency 6/4/2018     Past Surgical History:   Procedure Laterality Date    HX CORONARY STENT PLACEMENT      LM, RCA x 2     HX GI      colonoscopy x 3 - last 2/2017 - polyp    HX IMPLANTABLE CARDIOVERTER DEFIBRILLATOR       Social History     Socioeconomic History    Marital status:      Spouse name: Not on file    Number of children: Not on file    Years of education: Not on file    Highest education level: Not on file   Social Needs    Financial resource strain: Not on file    Food insecurity - worry: Not on file    Food insecurity - inability: Not on file   [place] Transportation needs - medical: Not on file   VHX needs - non-medical: Not on file   Occupational History    Not on file   Tobacco Use    Smoking status: Current Some Day Smoker     Packs/day: 0.50     Years: 45.00     Pack years: 22.50     Last attempt to quit: 2017     Years since quittin.8    Smokeless tobacco: Never Used    Tobacco comment: using Nicotine patches   Substance and Sexual Activity    Alcohol use: No    Drug use: Yes     Types: Marijuana    Sexual activity: No     Partners: Female   Other Topics Concern    Not on file   Social History Narrative    Not on file     Family History   Problem Relation Age of Onset    Hypertension Mother     Heart Disease Mother         MURMUR    Cancer Father         COLON    Colon Cancer Father     Other Sister         born totally handicapped/epileptic    Kidney Disease Sister          from renal shutdown    Heart Disease Brother     Other Brother         ?pancreatic cancer or ?pancreatitis    Cancer Maternal Uncle         toes and spread    Cancer Paternal Uncle         stomach    Heart Attack Maternal Grandfather 47    Cancer Paternal Grandfather         bone    Cancer Maternal Uncle         stomach    Cancer Maternal Uncle         lung    Anesth Problems Neg Hx        Current Medications:   Current Outpatient Medications   Medication Sig Dispense Refill    sacubitril-valsartan (ENTRESTO) 97 mg/103 mg tablet Take 1 Tab by mouth two (2) times a day. 180 Tab 3    metoprolol succinate (TOPROL XL) 50 mg XL tablet Take 1 Tab by mouth daily for 360 days. Indications: chronic heart failure 30 Tab 3    aspirin 81 mg chewable tablet Take 81 mg by mouth daily.  insulin NPH/insulin regular (NOVOLIN 70/30) 100 unit/mL (70-30) injection 10 Units by SubCUTAneous route Daily (before breakfast).  insulin NPH/insulin regular (NOVOLIN 70/30) 100 unit/mL (70-30) injection 8 Units by SubCUTAneous route Daily (before dinner).  Insulin Syringes, Disposable, 1 mL syrg Pt to take 10 units w/ breakfast and 8 units w/ dinner 500 Syringe 1    atorvastatin (LIPITOR) 40 mg tablet TAKE ONE TABLET BY MOUTH ONCE DAILY  Indications: hyperlipidemia 90 Tab 1    cholecalciferol (VITAMIN D3) 1,000 unit tablet Take 2 Tabs by mouth daily.  spironolactone (ALDACTONE) 25 mg tablet Take 1 Tab by mouth daily. 90 Tab 1    DULoxetine (CYMBALTA) 30 mg capsule Take 1 Cap by mouth daily. (Patient taking differently: Take 30 mg by mouth daily (with dinner). ) 90 Cap 3    amiodarone (CORDARONE) 200 mg tablet Take 1 Tab by mouth daily. Indications: PREVENTION OF RECURRENT ATRIAL FIBRILLATION 30 Tab 6    patiromer calcium sorbitex (VELTASSA) 8.4 gram powder Take 8.4 g by mouth daily. 4 Packet 0    bumetanide (BUMEX) 1 mg tablet Take 0.5 mg by mouth as needed (fluid retention).  acetaminophen (TYLENOL EXTRA STRENGTH) 500 mg tablet Take 1,000 mg by mouth every six (6) hours as needed for Pain. Indications: BACK PAIN         Allergies: Allergies   Allergen Reactions    Heparin (Porcine) Other (comments)     Was told when in the hospital that if he received heparin again that it would be deadly. Vitals:    Visit Vitals  /90 (BP 1 Location: Right arm, BP Patient Position: Sitting)   Pulse 78   Resp 20   Ht 5' 10\" (1.778 m)   Wt 204 lb (92.5 kg)   SpO2 98%   BMI 29.27 kg/m²       Physical Exam:     Constitutional:  A&O x 3, well developed, WM in NAD  -older than his stated age   HENT[de-identified]   Normocephalic and atraumatic. mucous membranes pink, moist   Neck:   Neck supple. No lymphadenopathy R carotid bruit   Cardiovascular:  PMI nondisplaced, AICD R infraclavicular space, RRR, S1 & S2, + S3,  2/6 systolic murmur at apex,   JVP ~12 cm, -  HJR. Pulmonary/Chest:  Effort normal,  faint end insp wheeze. no rhonchi or rales  Abdominal:   Soft.  Mildly distended, non-tender, + bowel sounds, mild hepatomegally   Extremities:   Distal calf vascular changes, no edema, no cyanosis, feet warm , + femoral pulses, + bilateral femoral bruits, difficult to palpate distal pedal pulses    Neurological:  A&O x 3 nofocal deficits    Skin:    Skin is warm and dry. Psychiatric:   He has a normal mood and affect. Recent Labs:   Lab Results   Component Value Date/Time    WBC 7.8 03/08/2018 02:42 PM    HGB 13.8 03/08/2018 02:42 PM    HCT 42.0 03/08/2018 02:42 PM    PLATELET 724 37/99/8977 02:42 PM    MCV 87.0 03/08/2018 02:42 PM     Lab Results   Component Value Date/Time    TSH 3.040 12/04/2017 10:17 AM      Lab Results   Component Value Date/Time    BNP 1,209 (H) 01/21/2017 12:25 PM    NT pro-BNP 6,258 (H) 01/31/2017 04:52 AM    NT pro-BNP 6,094 (H) 01/30/2017 04:16 AM    NT pro-BNP 6,168 (H) 01/29/2017 04:18 AM    NT pro-BNP 6,876 (H) 01/28/2017 03:38 AM    NT pro-BNP 5,427 (H) 01/27/2017 03:56 AM    PROBNP 1,277 (H) 10/09/2018 12:00 AM    PROBNP 1,086 (H) 09/20/2018 12:00 AM    PROBNP 1,399 (H) 09/12/2018 02:30 PM    PROBNP 1,907 (H) 07/05/2018 12:00 AM    PROBNP 1,705 (H) 05/23/2018 12:00 AM      Lab Results   Component Value Date/Time    Sodium 143 10/09/2018 12:00 AM    Potassium 4.3 10/09/2018 12:00 AM    Chloride 104 10/09/2018 12:00 AM    CO2 21 10/09/2018 12:00 AM    Anion gap 6 03/08/2018 02:42 PM    Glucose 155 (H) 10/09/2018 12:00 AM    BUN 25 10/09/2018 12:00 AM    Creatinine 1.29 (H) 10/09/2018 12:00 AM    BUN/Creatinine ratio 19 10/09/2018 12:00 AM    GFR est AA 67 10/09/2018 12:00 AM    GFR est non-AA 58 (L) 10/09/2018 12:00 AM    Calcium 9.1 10/09/2018 12:00 AM    Bilirubin, total 0.4 09/06/2018 02:32 PM    ALT (SGPT) 10 09/06/2018 02:32 PM    AST (SGOT) 14 09/06/2018 02:32 PM    Alk.  phosphatase 59 09/06/2018 02:32 PM    Protein, total 6.9 09/06/2018 02:32 PM    Albumin 4.4 09/06/2018 02:32 PM    Globulin 4.4 (H) 02/22/2017 03:16 AM    A-G Ratio 1.8 09/06/2018 02:32 PM      Lab Results   Component Value Date/Time    Hemoglobin A1c 7.1 (H) 09/06/2018 02:32 PM    Hemoglobin A1c (POC) 6.4 08/17/2017 11:00 AM          I have discussed the diagnosis with  Anel Pablo. and the intended plan as seen in the above orders. Questions were answered concerning future plans, and written instructions provided. I have discussed medication side effects and warnings with the patient as well. Thank you for allowing me to participate in the care of this delightful gentleman. Please do not hesitate to call with any questions. Opal Miller RN, ACNP-BC, [address] [place] [address]   24 hour VAD/HF Pager: [phone]

## 2018-11-19 NOTE — PATIENT INSTRUCTIONS
You are doing fair      INCREASE TOROL Xl to 100 mg daily      CONTINUE CURRENT medications  Watch the salt in your diet- if you develop fluid Ok to take bumex  if needed    continue to eat small portions,   Weight loss- continue     Take twice a day medications 12 hours apart with food    Labs today BMP, proBNP        Limit WHITE foods ( flour, sugar, pasta, rice, potatoes)    Walk more    Keep a positive attitude     Vascular surgery Dr Thea Bartlett or Dr Anselmo Hoskins    HF Education: Continue daily weights (in the morning, after voiding). Notify HF team of overnight weight gains > 2 lbs or weekly >5 lbs or if any of the following Sx. Continue to limit sodium intake & monitor your fluid intake .

## 2018-11-19 NOTE — LETTER
11/19/2018 3:55 PM 
 
Patient:  Bran Venegas. YOB: 1950 Date of Visit: 11/19/2018 Dear Main Yanez MD 
[address] [address] [facility] [address] VIA In Basket Macho Pina MD 
[facility] [address] [id number] VIA Facsimile: [phone] Shireen Marin MD 
[address] [facility] [address] [facility] [address] [address] VIA In Basket Tiffanie Juarez MD 
[address] [place] [address] [address] [facility] [phone] VIA In Basket 
 : Thank you for referring Mr. Eva Razo to me for evaluation/treatment. Below are the relevant portions of my assessment and plan of care. If you have questions, please do not hesitate to call me. I look forward to following Mr. Wendi Willis along with you. Sincerely, JENI Meier

## 2018-11-20 ENCOUNTER — OFFICE VISIT (OUTPATIENT)
Dept: CARDIOLOGY CLINIC | Age: 68
End: 2018-11-20

## 2018-11-20 ENCOUNTER — TELEPHONE (OUTPATIENT)
Dept: CARDIOLOGY CLINIC | Age: 68
End: 2018-11-20

## 2018-11-20 DIAGNOSIS — Z95.810 CARDIAC DEFIBRILLATOR IN SITU: Primary | ICD-10-CM

## 2018-11-20 LAB
BUN SERPL-MCNC: 14 MG/DL (ref 8–27)
BUN/CREAT SERPL: 10 (ref 10–24)
CALCIUM SERPL-MCNC: 8.8 MG/DL (ref 8.6–10.2)
CHLORIDE SERPL-SCNC: 102 MMOL/L (ref 96–106)
CO2 SERPL-SCNC: 22 MMOL/L (ref 20–29)
CREAT SERPL-MCNC: 1.38 MG/DL (ref 0.76–1.27)
FOLATE SERPL-MCNC: 8.9 NG/ML
GLUCOSE SERPL-MCNC: 115 MG/DL (ref 65–99)
POTASSIUM SERPL-SCNC: 3.9 MMOL/L (ref 3.5–5.2)
SODIUM SERPL-SCNC: 147 MMOL/L (ref 134–144)
VIT B12 SERPL-MCNC: 291 PG/ML (ref 232–1245)

## 2018-11-20 NOTE — TELEPHONE ENCOUNTER
----- Message from JENI Canales sent at 11/20/2018 10:29 AM EST -----  NA  And Cr up a bit- encourage po, and renal fn maybe related to elevated BP  B12 low normal and he may benefit from supplemental B complex vitamins. He is hesitant to take additional meds, but would offer. Continue Current plan  Thank you    I called patient and reviewed all lab results. He states understanding of directions, has no questions.

## 2018-11-20 NOTE — PROGRESS NOTES
NA  And Cr up a bit- encourage po, and renal fn maybe related to elevated BP  B12 low normal and he may benefit from supplemental B complex vitamins. He is hesitant to take additional meds, but would offer.    Continue Current plan  Thank you

## 2018-12-03 ENCOUNTER — OFFICE VISIT (OUTPATIENT)
Dept: CARDIOLOGY CLINIC | Age: 68
End: 2018-12-03

## 2018-12-03 VITALS
OXYGEN SATURATION: 97 % | BODY MASS INDEX: 29.29 KG/M2 | HEIGHT: 70 IN | WEIGHT: 204.6 LBS | DIASTOLIC BLOOD PRESSURE: 68 MMHG | SYSTOLIC BLOOD PRESSURE: 118 MMHG | TEMPERATURE: 97 F | HEART RATE: 58 BPM | RESPIRATION RATE: 24 BRPM

## 2018-12-03 DIAGNOSIS — E11.8 TYPE 2 DIABETES MELLITUS WITH COMPLICATION, UNSPECIFIED WHETHER LONG TERM INSULIN USE: ICD-10-CM

## 2018-12-03 DIAGNOSIS — I48.0 PAROXYSMAL ATRIAL FIBRILLATION (HCC): ICD-10-CM

## 2018-12-03 DIAGNOSIS — Z95.810 ICD (IMPLANTABLE CARDIOVERTER-DEFIBRILLATOR) IN PLACE: ICD-10-CM

## 2018-12-03 DIAGNOSIS — I25.10 CORONARY ARTERY DISEASE INVOLVING NATIVE CORONARY ARTERY OF NATIVE HEART WITHOUT ANGINA PECTORIS: ICD-10-CM

## 2018-12-03 DIAGNOSIS — I50.22 SYSTOLIC CHF, CHRONIC (HCC): ICD-10-CM

## 2018-12-03 DIAGNOSIS — N18.30 CHRONIC RENAL IMPAIRMENT, STAGE 3 (MODERATE) (HCC): ICD-10-CM

## 2018-12-03 DIAGNOSIS — I25.5 ISCHEMIC CARDIOMYOPATHY: Primary | ICD-10-CM

## 2018-12-03 DIAGNOSIS — R06.09 DOE (DYSPNEA ON EXERTION): ICD-10-CM

## 2018-12-03 DIAGNOSIS — I73.9 PERIPHERAL VASCULAR DISEASE (HCC): ICD-10-CM

## 2018-12-03 DIAGNOSIS — I73.9 CLAUDICATION (HCC): ICD-10-CM

## 2018-12-03 DIAGNOSIS — F17.200 TOBACCO DEPENDENCE SYNDROME: ICD-10-CM

## 2018-12-03 NOTE — PATIENT INSTRUCTIONS
You are doing fairly well   Stop smoking- at  [id number] reduce       CONTINUE CURRENT medications  Check Cover my Meds to see where the lowest cost meds are available    Take twice a day medications 12 hours apart with food    Labs today BMP, proBNP      Limit WHITE foods ( flour, sugar, pasta, rice, potatoes)    Walk more 5-10 minutes several times per day and increase your time     Keep a positive attitude       HF Education: Continue daily weights (in the morning, after voiding). Notify HF team of overnight weight gains > 2 lbs or weekly >5 lbs or if any of the following Sx. Continue to limit sodium intake & monitor your fluid intake .

## 2018-12-03 NOTE — PROGRESS NOTES
[facility]  Note      DOS:  12/3/2018     PRIMARY CARE PHYSICIAN: Cadence Gandhi MD  CARDIOLOGIST: Dr. Glen Trammell   EP: Larissa Good MD       Impression/Plan:    Chronic systolic heart failure (EF 25%), d/t ICM, ACC/AHA , NYHA class IIb            Continue the Entresto 97/103 mg BID             Continue  Toprol  mg daily            Hold on aldactone for now hx hyperkalemia while on GDMT, and he does not want to take valtessa             Prn bumex- has not needed    Labs today BMP, proBNP   Cardiac rehab is still recommended and will be beneficial for the PAD   Continue daily weights, sodium and fluid restriction   Follow  Up in clinic in 1 month    Encouraged to stop smoking- decrease by 1/2        HTN BP better on entresto and toprol XL    Continue meds        PAD- charles showed significant PAD,not amenable to  PTCA and the recommendations are for vascular surgery.  He would like to see a vascular surgeon at . [facility]    Refer to  Dr. Carolina Brooks or Dr. Marina Hsu as pt  would like a surgeon at [facility]      Encouraged to exercise and consider cardiac rehab   Smoking cessation   Encouraged to resume DAPT, statin -once medicare part D kicks in    CAD s/p PCI- he has seen Dr. Karyna Middleton    Continue ASA, BB             Off plavix and statin 2/2 no part D  - encouraged to see [employer],  [employer], or use cover my meds      PAF-- remains SR- Now off Amiodarone     Cont BB   Dr Cooper Humphrey to follow    No anticoag 2/2 GI bleed/AVMs    Recurrent tobacco use he has decreased smoking to < 1 ppd   Counseled on smoking cessation   Nicoderm patch       Incomplete LBBB  msec    CKD- Cr better  1.29   Monitor    Suspect KELLY and COPD            Encouraged him to see pulmonary for eval of COPD and sleep study-   Proventil  Inhaler prn -     Diabetes recent A1c 7.1    Insulin management by PCP            Consider Jardiance given his CAD- defer to Dr. Brianda Peralta- per PCP/cardiology     H/O lower GIB/s/p colorectal surgery negative genetics     Paraesthesias/neuropathy 2/2 DM, PAD    Vitamin B levels OK     Med Non-compliance       Pt asst from [employer] Encouraged to check on med cost at [facility], cover my meds or [employer]              Thank you for allowing me to participate in the care of this delightful  gentleman. Please do not hesitate to call with any questions. Opal Dominguez RN, ACNP-BC, [facility]       Chief Complaint   Patient presents with    Follow-up     HPI: Kellen Rosado is a 74y.o. year old  male with a history of chronic HFrEF (EF 25%) secondary to ICM- CAD s/p PCI of LM,  of RCA s/p AICD complicated by  PAF (amiodarone) , Hep C, GI bleed (s/p colorectal surgery), and remote tobacco abuse. He presents to clinic today for follow up of his heart failure. His last ECHO  2018 showed : LVD, EF 25%, severe GHK, AK  basal-mid inferior wall. G1DD, mild- mod LAE, mild MR/TR, RVSP 42 mmHg. AO root dilation 4.3. CATH: (17): LM ost70. LAD m50. D1 80. LCx d70; OM1 99, OM2 90. RCA p100. No AVG. PAP 53//36. s/p PCI (17): pRCA 2.5x38 Xience + 2.75x12 Xience. ostLM 4.0x12 Xience post-dil with 4.5x12 NC. CARDIAC MRI 17:LVEF 10%, LVH, RV dilated RVEF 25% GHK, marked LAE, mod CB, mild MR, mod-severe TR     He was last seen 3 weeks ago. Torpol was increased to 100 mg daily, and he took bumex for 2 days without any change in his sx or weight. Cr 1.38, K 3.9      He has been compliant with Entresto and Torpol XL, he remains off of aldactone and valtessa. Mr. Concetta Cat. reports stable NAVARRO with moderate exertion, stair climbing. Fatigue waxes and wanes. His activity is mostly limited by claudication. He continues to smoke  < 1 ppd. Notes more activity limiting  claudication and  would like to see a vascular surgeon at [facility].- waiting for Medicare. He sleeps well, 8-10 hours,   + Nocturia x 1, (+)  Snoring -  Naps ~ 3 times per week.    His weight is stable 195 at home,  has lost 10 # according to his home scale. He is diuresing with Entresto. He tries  to eat in moderation. Does not add salt, eats out 3-4 times per week. BP at home improving 116-120/75-80         Jack Delgado (Spud) lives with his wife. He recently welcomed his 7th great grandchild. He is retired from industrial pipe welding. He smoked 50 years- quit 1/2017, and has resumed. occasional etoh. He enjoys old movies, visiting with is 2 adult children and grand children. 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 Review of Systems: :    General:  Positive for fatigue, Denies fever, chills, negative  HEENT: Denies epistaxis, or angioedema   Pulmonary: Denies cough, wheeze   Cardiac: Denies exertional chest pain, palpitations, lightheadedness, dizziness, syncope, near syncope, AICD shock, bleeding. GI:  Denies N/V, abdominal pain,early satiety,  no bleeding or dark stool    Musculo: Arthritis, some neck pain at times   Neuro: + paraesthesia in feet-, Denies TIA or CVA sx, sz,    Skin:  negative      CARDIAC EVALUATION   ECHO (2/13/17): EF 10% severe GHK, PSM. Dil LA. Mod MR. Mild to mod TR. Left pleural effusion  ECHO (1/19/17): EF 5-10% with severe GHK,. Mildly dil LA. Mild TR. PASP 46. ECHO: (5/15/17) EF 25%, severe GHK, basal-mid inferior AK, severe LAE, Mod MR, Mild TR,  ECHO 7/11/2018: LVD, EF 25%, severe GHK, AK  basal-mid inferior  Wall. G1DD, mild- mod LAE, mild MR/TR, RVSP 42 mmHg. AO root dilation 4.3       CATH (1/20/17): LM ost70. LAD m50. D1 80. LCx d70; OM1 99, OM2 90. RCA p100. No AVG. PAP 53/27/36. --- s/p PCI (2/9/17): pRCA 2.5x38 Xience + 2.75x12 Xience. ostLM 4.0x12 Xience post-dil with 4.5x12 NC.     ICD (6/12/17, Anup): [employer] single lead    CARDIAC MRI 1/31/17:LVEF 10%, LVH, RV dilated RVEF 25% GHK, marked LAE, mod CB, mild MR, mod-severe TR, The entire LAD territory : largely viable and should recover upon  revascularization. The entire RCA territory is largely viable and should recover  upon revascularization. The LCx territory demonstrate medium-size infarct with  limited viability. Large left-sided pleural effusion with passive atelectasis of the left lung. RICKY (3/20/17): R: 0.58, L: 0.56    LE arteriogm 6/2018: bilateral occluded SFA      EKG 5/3/18: SR,  ms, QTc 449 ms     History:  Past Medical History:   Diagnosis Date    Arthritis     hands/fingers    CAD (coronary artery disease)     involving native coronary artery of native heart without angina pectoris    Cardiomyopathy ([address] 01/20/2017    A. Echo (1/19/17):  EF 5-10% with severe GHK,. Mildly dil LA. Mild TR. PASP 46./systemic cardiomyopathy    Chronic kidney disease     Chronic obstructive pulmonary disease (HCC)     Chronic renal insufficiency 03/06/2017    Claudication ([address] 10/9/2018    Congestive heart failure ([address]     Diabetes mellitus ([address] 3/6/2017    IDDM    Dyslipidemia 3/6/2017    A. FLP (1/19/17): Tot 120, , HDL 19, LDL 76 (no Rx).  H/O: GI bleed 3/6/2017    Hepatitis C 3/6/2017    Hypokalemia     ICD (implantable cardioverter-defibrillator) in place     Ill-defined condition     flu 1/2018     Neuropathy 11/19/2018    Noncompliance with medication regimen 9/11/2018    Paroxysmal atrial fibrillation ([address] 3/6/2017    Peripheral vascular disease ([address] 9/18/2017    A. RICKY (3/20/17): Right 0.58, Left 0.56.  Tobacco dependence syndrome 10/9/2018    Vitamin D deficiency 6/4/2018     Past Surgical History:   Procedure Laterality Date    COLONOSCOPY N/A 2/17/2017    COLONOSCOPY performed by Luly Vieira. Gamaliel Guillen MD at [address] COLONOSCOPY N/A 2/5/2018    COLONOSCOPY performed by Luly Vieira.  Gamaliel Guillen MD at [facility] ENDOSCOPY    HX CORONARY STENT PLACEMENT      LM, RCA x 2     HX GI      colonoscopy x 3 - last 2/2017 - polyp    HX IMPLANTABLE CARDIOVERTER DEFIBRILLATOR       Social History     Socioeconomic History    Marital status:      Spouse name: Not on file    Number of children: Not on file    Years of education: Not on file    Highest education level: Not on file   Social Needs    Financial resource strain: Not on file    Food insecurity - worry: Not on file    Food insecurity - inability: Not on file    Transportation needs - medical: Not on file   [employer] needs - non-medical: Not on file   Occupational History    Not on file   Tobacco Use    Smoking status: Current Some Day Smoker     Packs/day: 0.50     Years: 45.00     Pack years: 22.50     Last attempt to quit: 2017     Years since quittin.8    Smokeless tobacco: Never Used    Tobacco comment: using Nicotine patches   Substance and Sexual Activity    Alcohol use: No    Drug use: Yes     Types: Marijuana    Sexual activity: No     Partners: Female   Other Topics Concern    Not on file   Social History Narrative    Not on file     Family History   Problem Relation Age of Onset    Hypertension Mother     Heart Disease Mother         MURMUR    Cancer Father         COLON    Colon Cancer Father     Other Sister         born totally handicapped/epileptic    Kidney Disease Sister          from renal shutdown    Heart Disease Brother     Other Brother         ?pancreatic cancer or ?pancreatitis    Cancer Maternal Uncle         toes and spread    Cancer Paternal Uncle         stomach    Heart Attack Maternal Grandfather 47    Cancer Paternal Grandfather         bone    Cancer Maternal Uncle         stomach    Cancer Maternal Uncle         lung    Anesth Problems Neg Hx        Current Medications:   Current Outpatient Medications   Medication Sig Dispense Refill    metoprolol succinate (TOPROL-XL) 100 mg tablet Take 1 Tab by mouth daily for 360 days. 90 Tab 1    sacubitril-valsartan (ENTRESTO) 97 mg/103 mg tablet Take 1 Tab by mouth two (2) times a day.  180 Tab 3    aspirin 81 mg chewable tablet Take 81 mg by mouth daily.  insulin NPH/insulin regular (NOVOLIN 70/30) 100 unit/mL (70-30) injection 10 Units by SubCUTAneous route Daily (before breakfast).  insulin NPH/insulin regular (NOVOLIN 70/30) 100 unit/mL (70-30) injection 8 Units by SubCUTAneous route Daily (before dinner).  acetaminophen (TYLENOL EXTRA STRENGTH) 500 mg tablet Take 1,000 mg by mouth every six (6) hours as needed for Pain. Indications: BACK PAIN      Insulin Syringes, Disposable, 1 mL syrg Pt to take 10 units w/ breakfast and 8 units w/ dinner 500 Syringe 1    atorvastatin (LIPITOR) 40 mg tablet TAKE ONE TABLET BY MOUTH ONCE DAILY  Indications: hyperlipidemia 90 Tab 1    cholecalciferol (VITAMIN D3) 1,000 unit tablet Take 2 Tabs by mouth daily.  DULoxetine (CYMBALTA) 30 mg capsule Take 1 Cap by mouth daily. (Patient taking differently: Take 30 mg by mouth daily (with dinner). ) 90 Cap 3    amiodarone (CORDARONE) 200 mg tablet Take 1 Tab by mouth daily. Indications: PREVENTION OF RECURRENT ATRIAL FIBRILLATION 30 Tab 6    patiromer calcium sorbitex (VELTASSA) 8.4 gram powder Take 8.4 g by mouth daily. 4 Packet 0    bumetanide (BUMEX) 1 mg tablet Take 0.5 mg by mouth as needed (fluid retention). Allergies: Allergies   Allergen Reactions    Heparin (Porcine) Other (comments)     Was told when in the hospital that if he received heparin again that it would be deadly. Vitals:    Visit Vitals  /68 (BP 1 Location: Left arm, BP Patient Position: Sitting)   Pulse (!) 58   Temp 97 °F (36.1 °C) (Oral)   Resp 24   Ht 5' 10\" (1.778 m)   Wt 204 lb 9.6 oz (92.8 kg)   SpO2 97%   BMI 29.36 kg/m²       Physical Exam:     Constitutional:  A&O x 3, well developed, WM in NAD  -older than his stated age   HENT[de-identified]   Normocephalic and atraumatic. mucous membranes pink, moist   Neck:   Neck supple.  No lymphadenopathy R carotid bruit   Cardiovascular:  PMI nondisplaced, AICD R infraclavicular space, RRR, S1 & S2, + S3,  2/6 systolic murmur at apex,   JVP ~12 cm, -  HJR. Pulmonary/Chest:  Effort normal,  faint end insp wheeze. no rhonchi or rales  Abdominal:   Soft. Obese, , non-tender, + bowel sounds,    Extremities:   Distal calf vascular changes, no edema, no cyanosis, feet warm , + femoral pulses, + bilateral femoral bruits, difficult to palpate distal pedal pulses    Neurological:  A&O x 3 nofocal deficits    Skin:    Skin is warm and dry. Psychiatric:   He has a normal mood and affect. Recent Labs:   Lab Results   Component Value Date/Time    WBC 7.8 03/08/2018 02:42 PM    HGB 13.8 03/08/2018 02:42 PM    HCT 42.0 03/08/2018 02:42 PM    PLATELET 444 41/18/9272 02:42 PM    MCV 87.0 03/08/2018 02:42 PM     Lab Results   Component Value Date/Time    TSH 3.040 12/04/2017 10:17 AM      Lab Results   Component Value Date/Time    BNP 1,209 (H) 01/21/2017 12:25 PM    NT pro-BNP 6,258 (H) 01/31/2017 04:52 AM    NT pro-BNP 6,094 (H) 01/30/2017 04:16 AM    NT pro-BNP 6,168 (H) 01/29/2017 04:18 AM    NT pro-BNP 6,876 (H) 01/28/2017 03:38 AM    NT pro-BNP 5,427 (H) 01/27/2017 03:56 AM    PROBNP 1,277 (H) 10/09/2018 12:00 AM    PROBNP 1,086 (H) 09/20/2018 12:00 AM    PROBNP 1,399 (H) 09/12/2018 02:30 PM    PROBNP 1,907 (H) 07/05/2018 12:00 AM    PROBNP 1,705 (H) 05/23/2018 12:00 AM      Lab Results   Component Value Date/Time    Sodium 147 (H) 11/19/2018 12:00 AM    Potassium 3.9 11/19/2018 12:00 AM    Chloride 102 11/19/2018 12:00 AM    CO2 22 11/19/2018 12:00 AM    Anion gap 6 03/08/2018 02:42 PM    Glucose 115 (H) 11/19/2018 12:00 AM    BUN 14 11/19/2018 12:00 AM    Creatinine 1.38 (H) 11/19/2018 12:00 AM    BUN/Creatinine ratio 10 11/19/2018 12:00 AM    GFR est AA 62 11/19/2018 12:00 AM    GFR est non-AA 53 (L) 11/19/2018 12:00 AM    Calcium 8.8 11/19/2018 12:00 AM    Bilirubin, total 0.4 09/06/2018 02:32 PM    ALT (SGPT) 10 09/06/2018 02:32 PM    AST (SGOT) 14 09/06/2018 02:32 PM    Alk.  phosphatase 59 09/06/2018 02:32 PM    Protein, total 6.9 09/06/2018 02:32 PM    Albumin 4.4 09/06/2018 02:32 PM    Globulin 4.4 (H) 02/22/2017 03:16 AM    A-G Ratio 1.8 09/06/2018 02:32 PM      Lab Results   Component Value Date/Time    Hemoglobin A1c 7.1 (H) 09/06/2018 02:32 PM    Hemoglobin A1c (POC) 6.4 08/17/2017 11:00 AM          I have discussed the diagnosis with  Priscilla Kim and the intended plan as seen in the above orders. Questions were answered concerning future plans, and written instructions provided. I have discussed medication side effects and warnings with the patient as well. Thank you for allowing me to participate in the care of this delightful gentleman. Please do not hesitate to call with any questions. Opal Madsen RN, ACNP-BC, [address] [phone]  24 hour VAD/HF Pager: [phone]

## 2018-12-03 NOTE — LETTER
12/3/2018 4:05 PM 
 
Patient:  Mayra Alexander. YOB: 1950 Date of Visit: 12/3/2018 Dear Jaymei Melendrez MD 
[address]  [address] [facility] [address] VIA In Basket Mati Connelly MD 
[facility] [address] [address] VIA Facsimile: [phone] Dolph Cooks, MD 
[address] [facility] [address] [place] [address] VIA In Basket Gayla Carter MD 
[address] [place] [address] VIA In Basket Shawna Hollins MD 
[facility] [address] [address] VIA Facsimile: [phone] 
 : 
 
 
Thank you for referring Mr. Suad Acevedo to me for evaluation/treatment. Below are the relevant portions of my assessment and plan of care. If you have questions, please do not hesitate to call me. I look forward to following Mr. Thomson Nicely along with you. Sincerely, JENI Ley

## 2018-12-04 ENCOUNTER — TELEPHONE (OUTPATIENT)
Dept: CARDIOLOGY CLINIC | Age: 68
End: 2018-12-04

## 2018-12-04 LAB
BUN SERPL-MCNC: 22 MG/DL (ref 8–27)
BUN/CREAT SERPL: 18 (ref 10–24)
CALCIUM SERPL-MCNC: 9 MG/DL (ref 8.6–10.2)
CHLORIDE SERPL-SCNC: 106 MMOL/L (ref 96–106)
CO2 SERPL-SCNC: 21 MMOL/L (ref 20–29)
CREAT SERPL-MCNC: 1.23 MG/DL (ref 0.76–1.27)
GLUCOSE SERPL-MCNC: 109 MG/DL (ref 65–99)
POTASSIUM SERPL-SCNC: 4 MMOL/L (ref 3.5–5.2)
SODIUM SERPL-SCNC: 143 MMOL/L (ref 134–144)

## 2018-12-04 NOTE — TELEPHONE ENCOUNTER
----- Message from JENI Bettencourt sent at 12/4/2018 12:40 PM EST -----  Labs stable  Continue Current plan  Thank you    I called patient and reviewed all lab results. He states understanding, has no questions.

## 2018-12-06 ENCOUNTER — HOSPITAL ENCOUNTER (OUTPATIENT)
Dept: LAB | Age: 68
Discharge: HOME | End: 2018-12-06
Payer: MEDICARE

## 2018-12-06 ENCOUNTER — OFFICE VISIT (OUTPATIENT)
Dept: FAMILY MEDICINE CLINIC | Age: 68
End: 2018-12-06

## 2018-12-06 VITALS
SYSTOLIC BLOOD PRESSURE: 152 MMHG | OXYGEN SATURATION: 99 % | BODY MASS INDEX: 29.13 KG/M2 | RESPIRATION RATE: 18 BRPM | TEMPERATURE: 97.6 F | DIASTOLIC BLOOD PRESSURE: 86 MMHG | HEIGHT: 70 IN | WEIGHT: 203.44 LBS | HEART RATE: 59 BPM

## 2018-12-06 DIAGNOSIS — E78.5 DYSLIPIDEMIA: ICD-10-CM

## 2018-12-06 DIAGNOSIS — R80.9 PROTEINURIA, UNSPECIFIED TYPE: ICD-10-CM

## 2018-12-06 DIAGNOSIS — E11.8 TYPE 2 DIABETES MELLITUS WITH COMPLICATION, UNSPECIFIED WHETHER LONG TERM INSULIN USE: Primary | ICD-10-CM

## 2018-12-06 DIAGNOSIS — N18.30 CHRONIC RENAL IMPAIRMENT, STAGE 3 (MODERATE) (HCC): ICD-10-CM

## 2018-12-06 PROBLEM — E11.40 TYPE 2 DIABETES MELLITUS WITH DIABETIC NEUROPATHY (HCC): Status: ACTIVE | Noted: 2018-12-06

## 2018-12-06 PROCEDURE — 85025 COMPLETE CBC W/AUTO DIFF WBC: CPT

## 2018-12-06 PROCEDURE — 80061 LIPID PANEL: CPT

## 2018-12-06 PROCEDURE — 80053 COMPREHEN METABOLIC PANEL: CPT

## 2018-12-06 PROCEDURE — 83036 HEMOGLOBIN GLYCOSYLATED A1C: CPT

## 2018-12-06 PROCEDURE — 82043 UR ALBUMIN QUANTITATIVE: CPT

## 2018-12-06 PROCEDURE — 84443 ASSAY THYROID STIM HORMONE: CPT

## 2018-12-06 NOTE — PROGRESS NOTES
1. Have you been to the ER, urgent care clinic since your last visit? Hospitalized since your last visit? No 
 
2. Have you seen or consulted any other health care providers outside of the [address] [place] [address] since your last visit? Include any pap smears or colon screening. No  
Chief Complaint Patient presents with  Diabetes 3 mos fuv  
 
 
Addison Dates. 
12/6/2018 Provider:  
Danny:  Diabetes Report Card 1) Have you seen the eye doctor in past year?yes 2) How would you  rate your Diabetic Diet?fair 3) How well do you take care of your feet? yes 4) Do you keep your Primary Care Follow Up Appts? yes 5) Do you know your A1C goal?yes 6) Do you take your medications daily? yes 7) Do you check your blood sugars? yes 8) Have you gained weight?no 9) Do you follow an exercise program?yes 10) Can you do better?yes Lab Results Component Value Date/Time Cholesterol, total 232 (H) 09/06/2018 02:32 PM  
 HDL Cholesterol 32 (L) 09/06/2018 02:32 PM  
 LDL, calculated 142 (H) 09/06/2018 02:32 PM  
 Triglyceride 288 (H) 09/06/2018 02:32 PM  
 CHOL/HDL Ratio 6.3 (H) 01/19/2017 03:50 AM  
 
Lab Results Component Value Date/Time Hemoglobin A1c 7.1 (H) 09/06/2018 02:32 PM  
 Hemoglobin A1c 6.4 (H) 06/06/2018 10:55 AM  
 Hemoglobin A1c 6.4 (H) 03/05/2018 10:07 AM  
 Glucose 109 (H) 12/03/2018 12:00 AM  
 Glucose (POC) 146 (H) 03/13/2018 12:11 PM  
 Microalb/Creat ratio (ug/mg creat.) 106.5 (H) 12/04/2017 10:17 AM  
 LDL, calculated 142 (H) 09/06/2018 02:32 PM  
 Creatinine 1.23 12/03/2018 12:00 AM  
  
Lab Results Component Value Date/Time Microalb/Creat ratio (ug/mg creat.) 106.5 (H) 12/04/2017 10:17 AM  
  
Chief Complaint Patient presents with  Diabetes 3 mos fuv  
 
he is a 79y.o. year old male who presents for evaluation. See Diabetic Report Card listed above. Patient Active Problem List  
 Diagnosis  Type 2 diabetes mellitus with diabetic neuropathy (HCC)  Neuropathy  Claudication ([facility]  Tobacco dependence syndrome  Noncompliance with medication regimen  Proteinuria  Vitamin D deficiency  Polyp of colon  Advance care planning  Type 2 diabetes mellitus with nephropathy ([facility]  Hypokalemia  ICD (implantable cardioverter-defibrillator) in place  Peripheral vascular disease ([facility] A. RICKY (3/20/17): Right 0.58, Left 0.56.  Coronary artery disease involving native heart without angina pectoris  Systolic CHF, chronic ([facility]  Type 2 diabetes mellitus with complication ([facility]  Paroxysmal atrial fibrillation (HCC)  H/O: GI bleed  Hepatitis C  
 Chronic renal insufficiency  Dyslipidemia A. FLP (1/19/17): Tot 120, , HDL 19, LDL 76 (no Rx).  Ischemic cardiomyopathy A.  Echo (1/19/17):  EF 5-10% with severe GHK,. Mildly dil LA. Mild TR. PASP 46. B. Cath (1/20/17):  LM ost70. LAD m50. D1 80. LCx d70; OM1 99, OM2 90. RCA p100. No AVG. PAP  53/27/36. C.  PCI (2/9/17): pRCA 2.5x38 Xience + 2.75x12 Xience. ostLM 4.0x12 Xience post-dil with 4.5x12 NC. D.  Echo (2/13/17):  EF 10% with severe GHK, PSM. Dil LA. Mod MR. Mild to mod TR. Left pleural effusion. E.  Echo (5/15/17): EF 25% with severe GHK and basl-mid inf AK, mildly dil LV, DD. Sev dil LA. Mod MR. Mild TR. PASP 35. F.  ICD (6/12/17, Anup): [employer].  NAVARRO (dyspnea on exertion) Reviewed PmHx, RxHx, FmHx, SocHx, AllgHx--dated and updated in the chart. Review of Systems - negative except as listed above in the HPI Objective:  
 
Vitals:  
 12/06/18 1334 BP: 152/86 Pulse: (!) 59 Resp: 18 Temp: 97.6 °F (36.4 °C) TempSrc: Oral  
SpO2: 99% Weight: 203 lb 7 oz (92.3 kg) Height: 5' 10\" (1.778 m) Physical Examination: General appearance - alert, well appearing, and in no distress Neck - supple, no significant adenopathy Chest - clear to auscultation, no wheezes, rales or rhonchi, symmetric air entry Heart - normal rate, regular rhythm, normal S1, S2, no murmurs, rubs, clicks or gallops Abdomen - soft, nontender, nondistended, no masses or organomegaly Extremities - peripheral pulses normal, no pedal edema, no clubbing or cyanosis Assessment/ Plan:  
Diagnoses and all orders for this visit: 
 
1. Type 2 diabetes mellitus with complication, unspecified whether long term insulin use ([facility] -     LIPID PANEL 
-     METABOLIC PANEL, COMPREHENSIVE 
-     CBC WITH AUTOMATED DIFF 
-     TSH 3RD GENERATION 
-     MICROALBUMIN, UR, RAND W/ MICROALB/CREAT RATIO 
-     HEMOGLOBIN A1C WITH EAG 
-at goal 
 
2. Proteinuria, unspecified type -     LIPID PANEL 
-     METABOLIC PANEL, COMPREHENSIVE 3. Chronic renal impairment, stage 3 (moderate) (HCC) -     METABOLIC PANEL, COMPREHENSIVE 4. Dyslipidemia -     LIPID PANEL 
-     METABOLIC PANEL, COMPREHENSIVE Follow-up Disposition: 
Return in about 3 months (around 3/6/2019) for dm. Lab Results Component Value Date/Time Cholesterol, total 232 (H) 09/06/2018 02:32 PM  
 HDL Cholesterol 32 (L) 09/06/2018 02:32 PM  
 LDL, calculated 142 (H) 09/06/2018 02:32 PM  
 Triglyceride 288 (H) 09/06/2018 02:32 PM  
 CHOL/HDL Ratio 6.3 (H) 01/19/2017 03:50 AM  
 
Lab Results Component Value Date/Time Hemoglobin A1c 7.1 (H) 09/06/2018 02:32 PM  
 Hemoglobin A1c 6.4 (H) 06/06/2018 10:55 AM  
 Hemoglobin A1c 6.4 (H) 03/05/2018 10:07 AM  
 Microalb/Creat ratio (ug/mg creat.) 106.5 (H) 12/04/2017 10:17 AM  
 LDL, calculated 142 (H) 09/06/2018 02:32 PM  
 Creatinine 1.23 12/03/2018 12:00 AM  
  
 
 
Discussed with patient goal of Diabetes to include:  HgA1C <7, LDL cholesterol <100, Blood pressure <140/80. Discussed with patient diet and weight management and to get regular exercise. Recommend yearly eye exams and daily foot care. The patient understands and agrees with the plan. I have discussed the diagnosis with the patient and the intended plan as seen in the above orders. The patient has received an after-visit summary and questions were answered concerning future plans. Medication Side Effects and Warnings were discussed with patient Patient Labs were reviewed and or requested Patient Past Records were reviewed and or requested Domingo Garcia M.D. [address] [facility] There are no Patient Instructions on file for this visit.

## 2018-12-08 LAB
ALBUMIN SERPL-MCNC: 4.2 G/DL (ref 3.6–4.8)
ALBUMIN/CREAT UR: 1127.9 MG/G CREAT (ref 0–30)
ALBUMIN/GLOB SERPL: 1.8 {RATIO} (ref 1.2–2.2)
ALP SERPL-CCNC: 56 IU/L (ref 39–117)
ALT SERPL-CCNC: 6 IU/L (ref 0–44)
AST SERPL-CCNC: 11 IU/L (ref 0–40)
BASOPHILS # BLD AUTO: 0 X10E3/UL (ref 0–0.2)
BASOPHILS NFR BLD AUTO: 0 %
BILIRUB SERPL-MCNC: 0.4 MG/DL (ref 0–1.2)
BUN SERPL-MCNC: 16 MG/DL (ref 8–27)
BUN/CREAT SERPL: 13 (ref 10–24)
CALCIUM SERPL-MCNC: 9 MG/DL (ref 8.6–10.2)
CHLORIDE SERPL-SCNC: 106 MMOL/L (ref 96–106)
CHOLEST SERPL-MCNC: 199 MG/DL (ref 100–199)
CO2 SERPL-SCNC: 24 MMOL/L (ref 20–29)
CREAT SERPL-MCNC: 1.28 MG/DL (ref 0.76–1.27)
CREAT UR-MCNC: 54.8 MG/DL
EOSINOPHIL # BLD AUTO: 0.2 X10E3/UL (ref 0–0.4)
EOSINOPHIL NFR BLD AUTO: 3 %
ERYTHROCYTE [DISTWIDTH] IN BLOOD BY AUTOMATED COUNT: 15.2 % (ref 12.3–15.4)
EST. AVERAGE GLUCOSE BLD GHB EST-MCNC: 154 MG/DL
GLOBULIN SER CALC-MCNC: 2.3 G/DL (ref 1.5–4.5)
GLUCOSE SERPL-MCNC: 112 MG/DL (ref 65–99)
HBA1C MFR BLD: 7 % (ref 4.8–5.6)
HCT VFR BLD AUTO: 43.9 % (ref 37.5–51)
HDLC SERPL-MCNC: 30 MG/DL
HGB BLD-MCNC: 14.5 G/DL (ref 13–17.7)
IMM GRANULOCYTES # BLD: 0 X10E3/UL (ref 0–0.1)
IMM GRANULOCYTES NFR BLD: 0 %
INTERPRETATION, 910389: NORMAL
INTERPRETATION: NORMAL
LDLC SERPL CALC-MCNC: 140 MG/DL (ref 0–99)
LYMPHOCYTES # BLD AUTO: 1.1 X10E3/UL (ref 0.7–3.1)
LYMPHOCYTES NFR BLD AUTO: 15 %
Lab: NORMAL
MCH RBC QN AUTO: 27.2 PG (ref 26.6–33)
MCHC RBC AUTO-ENTMCNC: 33 G/DL (ref 31.5–35.7)
MCV RBC AUTO: 82 FL (ref 79–97)
MICROALBUMIN UR-MCNC: 618.1 UG/ML
MONOCYTES # BLD AUTO: 0.7 X10E3/UL (ref 0.1–0.9)
MONOCYTES NFR BLD AUTO: 10 %
NEUTROPHILS # BLD AUTO: 5.1 X10E3/UL (ref 1.4–7)
NEUTROPHILS NFR BLD AUTO: 72 %
PDF IMAGE, 910387: NORMAL
PLATELET # BLD AUTO: 150 X10E3/UL (ref 150–379)
POTASSIUM SERPL-SCNC: 4.4 MMOL/L (ref 3.5–5.2)
PROT SERPL-MCNC: 6.5 G/DL (ref 6–8.5)
RBC # BLD AUTO: 5.33 X10E6/UL (ref 4.14–5.8)
SODIUM SERPL-SCNC: 143 MMOL/L (ref 134–144)
TRIGL SERPL-MCNC: 147 MG/DL (ref 0–149)
TSH SERPL DL<=0.005 MIU/L-ACNC: 1.05 UIU/ML (ref 0.45–4.5)
VLDLC SERPL CALC-MCNC: 29 MG/DL (ref 5–40)
WBC # BLD AUTO: 7.1 X10E3/UL (ref 3.4–10.8)

## 2018-12-11 NOTE — PROGRESS NOTES
DM at goal, there is more protein in the urine and I would like to have the nephrologist see the pt as a baseline.  
 
Ayse Stratton

## 2018-12-31 NOTE — TELEPHONE ENCOUNTER
----- Message from Merritt Almonte sent at 12/31/2018 11:29 AM EST -----  Regarding: Dr. Dom Evans Telephone  Pt is requesting an immediate refill on insulin to be sent to [facility] (on file). Phone: [phone] Pt is completely out of his medication.

## 2019-01-01 ENCOUNTER — TELEPHONE (OUTPATIENT)
Dept: CARDIOLOGY CLINIC | Age: 69
End: 2019-01-01

## 2019-01-01 ENCOUNTER — ANESTHESIA EVENT (OUTPATIENT)
Dept: ENDOSCOPY | Age: 69
End: 2019-01-01
Payer: MEDICARE

## 2019-01-01 ENCOUNTER — OFFICE VISIT (OUTPATIENT)
Dept: CARDIOLOGY CLINIC | Age: 69
End: 2019-01-01

## 2019-01-01 ENCOUNTER — HOSPITAL ENCOUNTER (OUTPATIENT)
Dept: PREADMISSION TESTING | Age: 69
Discharge: HOME | End: 2019-11-14
Payer: MEDICARE

## 2019-01-01 ENCOUNTER — PATIENT OUTREACH (OUTPATIENT)
Dept: CARDIOLOGY CLINIC | Age: 69
End: 2019-01-01

## 2019-01-01 ENCOUNTER — ANESTHESIA EVENT (OUTPATIENT)
Dept: CARDIAC CATH/INVASIVE PROCEDURES | Age: 69
End: 2019-01-01
Payer: MEDICARE

## 2019-01-01 ENCOUNTER — PATIENT OUTREACH (OUTPATIENT)
Dept: FAMILY MEDICINE CLINIC | Age: 69
End: 2019-01-01

## 2019-01-01 ENCOUNTER — HOSPITAL ENCOUNTER (OUTPATIENT)
Dept: NON INVASIVE DIAGNOSTICS | Age: 69
Discharge: HOME | End: 2019-12-27
Payer: MEDICARE

## 2019-01-01 ENCOUNTER — CLINICAL SUPPORT (OUTPATIENT)
Dept: CARDIOLOGY CLINIC | Age: 69
End: 2019-01-01

## 2019-01-01 ENCOUNTER — ANESTHESIA (OUTPATIENT)
Dept: ENDOSCOPY | Age: 69
End: 2019-01-01
Payer: MEDICARE

## 2019-01-01 ENCOUNTER — OFFICE VISIT (OUTPATIENT)
Dept: FAMILY MEDICINE CLINIC | Age: 69
End: 2019-01-01

## 2019-01-01 ENCOUNTER — HOSPITAL ENCOUNTER (OUTPATIENT)
Dept: GENERAL RADIOLOGY | Age: 69
Discharge: HOME | End: 2019-11-14
Attending: INTERNAL MEDICINE
Payer: MEDICARE

## 2019-01-01 ENCOUNTER — HOSPITAL ENCOUNTER (OUTPATIENT)
Dept: NON INVASIVE DIAGNOSTICS | Age: 69
Discharge: HOME | End: 2019-09-27
Attending: SPECIALIST | Admitting: SPECIALIST
Payer: MEDICARE

## 2019-01-01 ENCOUNTER — OFFICE VISIT (OUTPATIENT)
Dept: ENDOCRINOLOGY | Age: 69
End: 2019-01-01

## 2019-01-01 ENCOUNTER — HOSPITAL ENCOUNTER (OUTPATIENT)
Age: 69
Setting detail: OUTPATIENT SURGERY
Discharge: HOME | End: 2019-11-21
Attending: INTERNAL MEDICINE | Admitting: INTERNAL MEDICINE
Payer: MEDICARE

## 2019-01-01 ENCOUNTER — HOSPITAL ENCOUNTER (OUTPATIENT)
Dept: CARDIAC CATH/INVASIVE PROCEDURES | Age: 69
Discharge: HOME | End: 2019-12-27
Attending: SPECIALIST | Admitting: SPECIALIST
Payer: MEDICARE

## 2019-01-01 ENCOUNTER — HOSPITAL ENCOUNTER (OUTPATIENT)
Dept: LAB | Age: 69
Discharge: HOME | End: 2019-09-25

## 2019-01-01 ENCOUNTER — ANESTHESIA (OUTPATIENT)
Dept: CARDIAC CATH/INVASIVE PROCEDURES | Age: 69
End: 2019-01-01
Payer: MEDICARE

## 2019-01-01 ENCOUNTER — HOSPITAL ENCOUNTER (OUTPATIENT)
Age: 69
Setting detail: OUTPATIENT SURGERY
Discharge: HOME | End: 2019-10-22
Attending: INTERNAL MEDICINE | Admitting: INTERNAL MEDICINE
Payer: MEDICARE

## 2019-01-01 ENCOUNTER — PATIENT OUTREACH (OUTPATIENT)
Dept: INTERNAL MEDICINE CLINIC | Age: 69
End: 2019-01-01

## 2019-01-01 ENCOUNTER — HOSPITAL ENCOUNTER (OUTPATIENT)
Age: 69
Setting detail: OBSERVATION
Discharge: HOME | End: 2019-12-17
Attending: INTERNAL MEDICINE | Admitting: INTERNAL MEDICINE
Payer: MEDICARE

## 2019-01-01 ENCOUNTER — APPOINTMENT (OUTPATIENT)
Dept: CARDIAC CATH/INVASIVE PROCEDURES | Age: 69
End: 2019-01-01
Attending: INTERNAL MEDICINE
Payer: MEDICARE

## 2019-01-01 VITALS
OXYGEN SATURATION: 98 % | HEIGHT: 70 IN | BODY MASS INDEX: 28.43 KG/M2 | HEART RATE: 68 BPM | WEIGHT: 198.6 LBS | SYSTOLIC BLOOD PRESSURE: 128 MMHG | DIASTOLIC BLOOD PRESSURE: 76 MMHG | RESPIRATION RATE: 16 BRPM

## 2019-01-01 VITALS
RESPIRATION RATE: 17 BRPM | WEIGHT: 203 LBS | HEIGHT: 70 IN | BODY MASS INDEX: 29.06 KG/M2 | HEART RATE: 68 BPM | OXYGEN SATURATION: 96 % | DIASTOLIC BLOOD PRESSURE: 68 MMHG | SYSTOLIC BLOOD PRESSURE: 128 MMHG

## 2019-01-01 VITALS
BODY MASS INDEX: 28.09 KG/M2 | HEIGHT: 70 IN | WEIGHT: 196.2 LBS | DIASTOLIC BLOOD PRESSURE: 76 MMHG | SYSTOLIC BLOOD PRESSURE: 144 MMHG | HEART RATE: 73 BPM

## 2019-01-01 VITALS
DIASTOLIC BLOOD PRESSURE: 62 MMHG | RESPIRATION RATE: 20 BRPM | HEART RATE: 80 BPM | TEMPERATURE: 97.5 F | WEIGHT: 201.2 LBS | BODY MASS INDEX: 28.8 KG/M2 | HEIGHT: 70 IN | SYSTOLIC BLOOD PRESSURE: 100 MMHG | OXYGEN SATURATION: 96 %

## 2019-01-01 VITALS
TEMPERATURE: 96.8 F | BODY MASS INDEX: 29.2 KG/M2 | OXYGEN SATURATION: 96 % | SYSTOLIC BLOOD PRESSURE: 134 MMHG | HEART RATE: 76 BPM | RESPIRATION RATE: 14 BRPM | WEIGHT: 203.5 LBS | DIASTOLIC BLOOD PRESSURE: 74 MMHG

## 2019-01-01 VITALS
TEMPERATURE: 98 F | SYSTOLIC BLOOD PRESSURE: 114 MMHG | OXYGEN SATURATION: 91 % | HEIGHT: 70 IN | WEIGHT: 210.32 LBS | RESPIRATION RATE: 19 BRPM | BODY MASS INDEX: 30.11 KG/M2 | HEART RATE: 72 BPM | DIASTOLIC BLOOD PRESSURE: 59 MMHG

## 2019-01-01 VITALS
RESPIRATION RATE: 18 BRPM | WEIGHT: 202 LBS | BODY MASS INDEX: 28.92 KG/M2 | SYSTOLIC BLOOD PRESSURE: 124 MMHG | DIASTOLIC BLOOD PRESSURE: 76 MMHG | OXYGEN SATURATION: 96 % | HEART RATE: 68 BPM | HEIGHT: 70 IN

## 2019-01-01 VITALS
TEMPERATURE: 97.8 F | WEIGHT: 202.2 LBS | HEIGHT: 70 IN | HEART RATE: 62 BPM | BODY MASS INDEX: 28.95 KG/M2 | OXYGEN SATURATION: 98 % | SYSTOLIC BLOOD PRESSURE: 138 MMHG | RESPIRATION RATE: 24 BRPM | DIASTOLIC BLOOD PRESSURE: 64 MMHG

## 2019-01-01 VITALS
OXYGEN SATURATION: 88 % | BODY MASS INDEX: 29.06 KG/M2 | WEIGHT: 203 LBS | HEIGHT: 70 IN | TEMPERATURE: 98 F | RESPIRATION RATE: 15 BRPM | SYSTOLIC BLOOD PRESSURE: 90 MMHG | HEART RATE: 69 BPM | DIASTOLIC BLOOD PRESSURE: 44 MMHG

## 2019-01-01 VITALS
RESPIRATION RATE: 18 BRPM | WEIGHT: 204 LBS | HEIGHT: 70 IN | TEMPERATURE: 98.2 F | HEART RATE: 70 BPM | SYSTOLIC BLOOD PRESSURE: 126 MMHG | BODY MASS INDEX: 29.2 KG/M2 | OXYGEN SATURATION: 95 % | DIASTOLIC BLOOD PRESSURE: 74 MMHG

## 2019-01-01 VITALS
HEIGHT: 70 IN | RESPIRATION RATE: 20 BRPM | OXYGEN SATURATION: 95 % | TEMPERATURE: 98 F | HEART RATE: 66 BPM | DIASTOLIC BLOOD PRESSURE: 63 MMHG | SYSTOLIC BLOOD PRESSURE: 97 MMHG | WEIGHT: 203 LBS | BODY MASS INDEX: 29.06 KG/M2

## 2019-01-01 VITALS
WEIGHT: 201.4 LBS | HEIGHT: 70 IN | DIASTOLIC BLOOD PRESSURE: 76 MMHG | HEART RATE: 76 BPM | SYSTOLIC BLOOD PRESSURE: 132 MMHG | RESPIRATION RATE: 18 BRPM | OXYGEN SATURATION: 95 % | BODY MASS INDEX: 28.83 KG/M2

## 2019-01-01 VITALS
SYSTOLIC BLOOD PRESSURE: 158 MMHG | BODY MASS INDEX: 29.63 KG/M2 | RESPIRATION RATE: 20 BRPM | HEART RATE: 65 BPM | DIASTOLIC BLOOD PRESSURE: 86 MMHG | HEIGHT: 70 IN | WEIGHT: 207 LBS

## 2019-01-01 VITALS
WEIGHT: 203.8 LBS | BODY MASS INDEX: 29.18 KG/M2 | SYSTOLIC BLOOD PRESSURE: 136 MMHG | OXYGEN SATURATION: 97 % | DIASTOLIC BLOOD PRESSURE: 90 MMHG | TEMPERATURE: 96.2 F | RESPIRATION RATE: 20 BRPM | HEIGHT: 70 IN | HEART RATE: 76 BPM

## 2019-01-01 VITALS
HEIGHT: 70 IN | TEMPERATURE: 98 F | RESPIRATION RATE: 18 BRPM | WEIGHT: 202.38 LBS | HEART RATE: 62 BPM | DIASTOLIC BLOOD PRESSURE: 67 MMHG | SYSTOLIC BLOOD PRESSURE: 124 MMHG | BODY MASS INDEX: 28.97 KG/M2 | OXYGEN SATURATION: 95 %

## 2019-01-01 DIAGNOSIS — I25.5 ISCHEMIC CARDIOMYOPATHY: ICD-10-CM

## 2019-01-01 DIAGNOSIS — E55.9 VITAMIN D DEFICIENCY: ICD-10-CM

## 2019-01-01 DIAGNOSIS — N18.30 CHRONIC RENAL IMPAIRMENT, STAGE 3 (MODERATE) (HCC): ICD-10-CM

## 2019-01-01 DIAGNOSIS — I50.20 SYSTOLIC HEART FAILURE, ACC/AHA STAGE C (HCC): ICD-10-CM

## 2019-01-01 DIAGNOSIS — I50.20 SYSTOLIC CONGESTIVE HEART FAILURE, UNSPECIFIED HF CHRONICITY (HCC): ICD-10-CM

## 2019-01-01 DIAGNOSIS — I25.10 CORONARY ARTERY DISEASE INVOLVING NATIVE CORONARY ARTERY OF NATIVE HEART WITHOUT ANGINA PECTORIS: ICD-10-CM

## 2019-01-01 DIAGNOSIS — R06.02 SOB (SHORTNESS OF BREATH): ICD-10-CM

## 2019-01-01 DIAGNOSIS — I48.0 PAROXYSMAL ATRIAL FIBRILLATION (HCC): ICD-10-CM

## 2019-01-01 DIAGNOSIS — E11.8 TYPE 2 DIABETES MELLITUS WITH COMPLICATION (HCC): Primary | ICD-10-CM

## 2019-01-01 DIAGNOSIS — I25.5 ISCHEMIC CARDIOMYOPATHY: Primary | ICD-10-CM

## 2019-01-01 DIAGNOSIS — R06.02 SHORTNESS OF BREATH: ICD-10-CM

## 2019-01-01 DIAGNOSIS — I73.9 PERIPHERAL VASCULAR DISEASE (HCC): ICD-10-CM

## 2019-01-01 DIAGNOSIS — I48.0 PAF (PAROXYSMAL ATRIAL FIBRILLATION) (HCC): Primary | ICD-10-CM

## 2019-01-01 DIAGNOSIS — I73.9 CLAUDICATION (HCC): ICD-10-CM

## 2019-01-01 DIAGNOSIS — E56.9 VITAMIN DEFICIENCY: ICD-10-CM

## 2019-01-01 DIAGNOSIS — I48.0 PAF (PAROXYSMAL ATRIAL FIBRILLATION) (HCC): ICD-10-CM

## 2019-01-01 DIAGNOSIS — I50.22 CHRONIC SYSTOLIC HEART FAILURE (HCC): ICD-10-CM

## 2019-01-01 DIAGNOSIS — I50.22 CHRONIC SYSTOLIC CONGESTIVE HEART FAILURE (HCC): Primary | ICD-10-CM

## 2019-01-01 DIAGNOSIS — E11.21 TYPE 2 DIABETES MELLITUS WITH NEPHROPATHY (HCC): ICD-10-CM

## 2019-01-01 DIAGNOSIS — N18.30 CKD (CHRONIC KIDNEY DISEASE) STAGE 3, GFR 30-59 ML/MIN (HCC): ICD-10-CM

## 2019-01-01 DIAGNOSIS — I48.91 ATRIAL FIBRILLATION, UNSPECIFIED TYPE (HCC): ICD-10-CM

## 2019-01-01 DIAGNOSIS — I50.22 CHRONIC SYSTOLIC CONGESTIVE HEART FAILURE, NYHA CLASS 3 (HCC): Primary | ICD-10-CM

## 2019-01-01 DIAGNOSIS — I50.22 CHRONIC SYSTOLIC CONGESTIVE HEART FAILURE (HCC): ICD-10-CM

## 2019-01-01 DIAGNOSIS — E11.8 TYPE 2 DIABETES MELLITUS WITH COMPLICATION, UNSPECIFIED WHETHER LONG TERM INSULIN USE: ICD-10-CM

## 2019-01-01 DIAGNOSIS — R06.09 DOE (DYSPNEA ON EXERTION): ICD-10-CM

## 2019-01-01 DIAGNOSIS — Z23 ENCOUNTER FOR IMMUNIZATION: ICD-10-CM

## 2019-01-01 DIAGNOSIS — I73.9 PERIPHERAL VASCULAR DISEASE, UNSPECIFIED (HCC): ICD-10-CM

## 2019-01-01 DIAGNOSIS — E55.9 VITAMIN D DEFICIENCY: Primary | ICD-10-CM

## 2019-01-01 DIAGNOSIS — E78.5 DYSLIPIDEMIA: ICD-10-CM

## 2019-01-01 DIAGNOSIS — I48.91 ATRIAL FIBRILLATION, UNSPECIFIED TYPE (HCC): Primary | ICD-10-CM

## 2019-01-01 DIAGNOSIS — E05.90 HYPERTHYROIDISM: Primary | ICD-10-CM

## 2019-01-01 DIAGNOSIS — J43.9 PULMONARY EMPHYSEMA, UNSPECIFIED EMPHYSEMA TYPE (HCC): ICD-10-CM

## 2019-01-01 DIAGNOSIS — I50.20 SYSTOLIC CONGESTIVE HEART FAILURE, UNSPECIFIED HF CHRONICITY (HCC): Primary | ICD-10-CM

## 2019-01-01 DIAGNOSIS — I50.20 SYSTOLIC HEART FAILURE, ACC/AHA STAGE C (HCC): Primary | ICD-10-CM

## 2019-01-01 DIAGNOSIS — Z95.810 ICD (IMPLANTABLE CARDIOVERTER-DEFIBRILLATOR) IN PLACE: Primary | ICD-10-CM

## 2019-01-01 DIAGNOSIS — I48.0 PAROXYSMAL ATRIAL FIBRILLATION (HCC): Primary | ICD-10-CM

## 2019-01-01 DIAGNOSIS — Z95.810 ICD (IMPLANTABLE CARDIOVERTER-DEFIBRILLATOR) IN PLACE: ICD-10-CM

## 2019-01-01 DIAGNOSIS — I50.20 SYSTOLIC CONGESTIVE HEART FAILURE WITH REDUCED LEFT VENTRICULAR FUNCTION, NYHA CLASS 2 (HCC): ICD-10-CM

## 2019-01-01 DIAGNOSIS — Z87.19 H/O: GI BLEED: ICD-10-CM

## 2019-01-01 DIAGNOSIS — Z95.810 CARDIAC DEFIBRILLATOR IN SITU: Primary | ICD-10-CM

## 2019-01-01 DIAGNOSIS — I44.7 INCOMPLETE LEFT BUNDLE BRANCH BLOCK (LBBB): ICD-10-CM

## 2019-01-01 DIAGNOSIS — N18.9 CHRONIC RENAL IMPAIRMENT, UNSPECIFIED CKD STAGE: ICD-10-CM

## 2019-01-01 LAB
25(OH)D3+25(OH)D2 SERPL-MCNC: 32.9 NG/ML (ref 30–100)
25(OH)D3+25(OH)D2 SERPL-MCNC: 36.2 NG/ML (ref 30–100)
ALBUMIN SERPL-MCNC: 3.7 G/DL (ref 3.5–5)
ALBUMIN SERPL-MCNC: 4.4 G/DL (ref 3.6–4.8)
ALBUMIN/GLOB SERPL: 1.1 {RATIO} (ref 1.1–2.2)
ALBUMIN/GLOB SERPL: 1.7 {RATIO} (ref 1.2–2.2)
ALP SERPL-CCNC: 60 IU/L (ref 39–117)
ALP SERPL-CCNC: 68 U/L (ref 45–117)
ALT SERPL-CCNC: 15 IU/L (ref 0–44)
ALT SERPL-CCNC: 22 U/L (ref 12–78)
ANION GAP SERPL CALC-SCNC: 4 MMOL/L (ref 5–15)
ANION GAP SERPL CALC-SCNC: 5 MMOL/L (ref 5–15)
APPEARANCE UR: CLEAR
AST SERPL-CCNC: 11 U/L (ref 15–37)
AST SERPL-CCNC: 13 IU/L (ref 0–40)
BACTERIA SPEC CULT: NORMAL
BACTERIA SPEC CULT: NORMAL
BACTERIA URNS QL MICRO: NEGATIVE /HPF
BASOPHILS # BLD: 0.1 K/UL (ref 0–0.1)
BASOPHILS NFR BLD: 1 % (ref 0–1)
BILIRUB SERPL-MCNC: 0.5 MG/DL (ref 0.2–1)
BILIRUB SERPL-MCNC: 0.6 MG/DL (ref 0–1.2)
BILIRUB UR QL: NEGATIVE
BUN SERPL-MCNC: 25 MG/DL (ref 8–27)
BUN SERPL-MCNC: 25 MG/DL (ref 8–27)
BUN SERPL-MCNC: 28 MG/DL (ref 8–27)
BUN SERPL-MCNC: 29 MG/DL (ref 6–20)
BUN SERPL-MCNC: 29 MG/DL (ref 8–27)
BUN SERPL-MCNC: 30 MG/DL (ref 8–27)
BUN SERPL-MCNC: 38 MG/DL (ref 8–27)
BUN SERPL-MCNC: 45 MG/DL (ref 6–20)
BUN/CREAT SERPL: 13 (ref 10–24)
BUN/CREAT SERPL: 14 (ref 10–24)
BUN/CREAT SERPL: 14 (ref 10–24)
BUN/CREAT SERPL: 14 (ref 12–20)
BUN/CREAT SERPL: 16 (ref 10–24)
BUN/CREAT SERPL: 16 (ref 10–24)
BUN/CREAT SERPL: 19 (ref 10–24)
BUN/CREAT SERPL: 21 (ref 12–20)
CALCIUM SERPL-MCNC: 8.7 MG/DL (ref 8.5–10.1)
CALCIUM SERPL-MCNC: 9.1 MG/DL (ref 8.5–10.1)
CALCIUM SERPL-MCNC: 9.3 MG/DL (ref 8.6–10.2)
CALCIUM SERPL-MCNC: 9.3 MG/DL (ref 8.6–10.2)
CALCIUM SERPL-MCNC: 9.4 MG/DL (ref 8.6–10.2)
CALCIUM SERPL-MCNC: 9.6 MG/DL (ref 8.6–10.2)
CALCIUM SERPL-MCNC: 9.8 MG/DL (ref 8.6–10.2)
CALCIUM SERPL-MCNC: 9.9 MG/DL (ref 8.6–10.2)
CHLORIDE SERPL-SCNC: 100 MMOL/L (ref 96–106)
CHLORIDE SERPL-SCNC: 102 MMOL/L (ref 96–106)
CHLORIDE SERPL-SCNC: 103 MMOL/L (ref 96–106)
CHLORIDE SERPL-SCNC: 105 MMOL/L (ref 96–106)
CHLORIDE SERPL-SCNC: 105 MMOL/L (ref 97–108)
CHLORIDE SERPL-SCNC: 107 MMOL/L (ref 97–108)
CHLORIDE SERPL-SCNC: 96 MMOL/L (ref 96–106)
CHLORIDE SERPL-SCNC: 96 MMOL/L (ref 96–106)
CO2 SERPL-SCNC: 22 MMOL/L (ref 20–29)
CO2 SERPL-SCNC: 23 MMOL/L (ref 20–29)
CO2 SERPL-SCNC: 23 MMOL/L (ref 20–29)
CO2 SERPL-SCNC: 25 MMOL/L (ref 21–32)
CO2 SERPL-SCNC: 26 MMOL/L (ref 21–32)
COLOR UR: ABNORMAL
CREAT SERPL-MCNC: 1.58 MG/DL (ref 0.76–1.27)
CREAT SERPL-MCNC: 1.83 MG/DL (ref 0.76–1.27)
CREAT SERPL-MCNC: 2 MG/DL (ref 0.76–1.27)
CREAT SERPL-MCNC: 2.02 MG/DL (ref 0.76–1.27)
CREAT SERPL-MCNC: 2.02 MG/DL (ref 0.76–1.27)
CREAT SERPL-MCNC: 2.06 MG/DL (ref 0.76–1.27)
CREAT SERPL-MCNC: 2.09 MG/DL (ref 0.7–1.3)
CREAT SERPL-MCNC: 2.15 MG/DL (ref 0.7–1.3)
DIFFERENTIAL METHOD BLD: ABNORMAL
ECHO LA MAJOR AXIS: 3.83 CM
ECHO LV INTERNAL DIMENSION DIASTOLIC: 4.69 CM (ref 4.2–5.9)
ECHO LV INTERNAL DIMENSION SYSTOLIC: 4.2 CM
ECHO LV IVSD: 0.9 CM (ref 0.6–1)
ECHO LV MASS 2D: 165.3 G (ref 88–224)
ECHO LV MASS INDEX 2D: 78.7 G/M2 (ref 49–115)
ECHO LV POSTERIOR WALL DIASTOLIC: 0.91 CM (ref 0.6–1)
EOSINOPHIL # BLD: 0.2 K/UL (ref 0–0.4)
EOSINOPHIL NFR BLD: 2 % (ref 0–7)
EPITH CASTS URNS QL MICRO: ABNORMAL /LPF
ERYTHROCYTE [DISTWIDTH] IN BLOOD BY AUTOMATED COUNT: 12.8 % (ref 11.5–14.5)
ERYTHROCYTE [DISTWIDTH] IN BLOOD BY AUTOMATED COUNT: 12.9 % (ref 11.5–14.5)
ERYTHROCYTE [DISTWIDTH] IN BLOOD BY AUTOMATED COUNT: 13 % (ref 11.5–14.5)
EST. AVERAGE GLUCOSE BLD GHB EST-MCNC: 237 MG/DL
GLOBULIN SER CALC-MCNC: 2.6 G/DL (ref 1.5–4.5)
GLOBULIN SER CALC-MCNC: 3.3 G/DL (ref 2–4)
GLUCOSE BLD STRIP.AUTO-MCNC: 173 MG/DL (ref 65–100)
GLUCOSE BLD STRIP.AUTO-MCNC: 185 MG/DL (ref 65–100)
GLUCOSE BLD STRIP.AUTO-MCNC: 191 MG/DL (ref 65–100)
GLUCOSE BLD STRIP.AUTO-MCNC: 232 MG/DL (ref 65–100)
GLUCOSE BLD STRIP.AUTO-MCNC: 255 MG/DL (ref 65–100)
GLUCOSE BLD STRIP.AUTO-MCNC: 276 MG/DL (ref 65–100)
GLUCOSE BLD STRIP.AUTO-MCNC: 280 MG/DL (ref 65–100)
GLUCOSE BLD STRIP.AUTO-MCNC: 313 MG/DL (ref 65–100)
GLUCOSE SERPL-MCNC: 124 MG/DL (ref 65–99)
GLUCOSE SERPL-MCNC: 151 MG/DL (ref 65–99)
GLUCOSE SERPL-MCNC: 171 MG/DL (ref 65–99)
GLUCOSE SERPL-MCNC: 219 MG/DL (ref 65–100)
GLUCOSE SERPL-MCNC: 223 MG/DL (ref 65–99)
GLUCOSE SERPL-MCNC: 227 MG/DL (ref 65–99)
GLUCOSE SERPL-MCNC: 300 MG/DL (ref 65–99)
GLUCOSE SERPL-MCNC: 315 MG/DL (ref 65–100)
GLUCOSE UR STRIP.AUTO-MCNC: >1000 MG/DL
HBA1C MFR BLD: 9.9 % (ref 4–5.6)
HCT VFR BLD AUTO: 40.6 % (ref 36.6–50.3)
HCT VFR BLD AUTO: 41.4 % (ref 36.6–50.3)
HCT VFR BLD AUTO: 41.6 % (ref 36.6–50.3)
HGB BLD-MCNC: 13.4 G/DL (ref 12.1–17)
HGB BLD-MCNC: 13.6 G/DL (ref 12.1–17)
HGB BLD-MCNC: 13.8 G/DL (ref 12.1–17)
HGB UR QL STRIP: ABNORMAL
HYALINE CASTS URNS QL MICRO: ABNORMAL /LPF (ref 0–5)
IMM GRANULOCYTES # BLD AUTO: 0.1 K/UL (ref 0–0.04)
IMM GRANULOCYTES NFR BLD AUTO: 1 % (ref 0–0.5)
KETONES UR QL STRIP.AUTO: NEGATIVE MG/DL
LEUKOCYTE ESTERASE UR QL STRIP.AUTO: NEGATIVE
LYMPHOCYTES # BLD: 1.1 K/UL (ref 0.8–3.5)
LYMPHOCYTES NFR BLD: 13 % (ref 12–49)
MAGNESIUM SERPL-MCNC: 1.8 MG/DL (ref 1.6–2.3)
MAGNESIUM SERPL-MCNC: 2 MG/DL (ref 1.6–2.3)
MAGNESIUM SERPL-MCNC: 2.1 MG/DL (ref 1.6–2.3)
MAGNESIUM SERPL-MCNC: 2.1 MG/DL (ref 1.6–2.3)
MCH RBC QN AUTO: 29.3 PG (ref 26–34)
MCH RBC QN AUTO: 29.6 PG (ref 26–34)
MCH RBC QN AUTO: 29.8 PG (ref 26–34)
MCHC RBC AUTO-ENTMCNC: 32.9 G/DL (ref 30–36.5)
MCHC RBC AUTO-ENTMCNC: 33 G/DL (ref 30–36.5)
MCHC RBC AUTO-ENTMCNC: 33.2 G/DL (ref 30–36.5)
MCV RBC AUTO: 88.3 FL (ref 80–99)
MCV RBC AUTO: 90.2 FL (ref 80–99)
MCV RBC AUTO: 90.2 FL (ref 80–99)
MONOCYTES # BLD: 0.8 K/UL (ref 0–1)
MONOCYTES NFR BLD: 9 % (ref 5–13)
NEUTS SEG # BLD: 6.3 K/UL (ref 1.8–8)
NEUTS SEG NFR BLD: 74 % (ref 32–75)
NITRITE UR QL STRIP.AUTO: NEGATIVE
NRBC # BLD: 0 K/UL (ref 0–0.01)
NRBC BLD-RTO: 0 PER 100 WBC
NT-PROBNP SERPL-MCNC: 1375 PG/ML (ref 0–376)
NT-PROBNP SERPL-MCNC: 1409 PG/ML (ref 0–376)
NT-PROBNP SERPL-MCNC: 1609 PG/ML (ref 0–376)
NT-PROBNP SERPL-MCNC: 709 PG/ML (ref 0–376)
NT-PROBNP SERPL-MCNC: 969 PG/ML (ref 0–376)
PH UR STRIP: 6.5 [PH] (ref 5–8)
PLATELET # BLD AUTO: 126 K/UL (ref 150–400)
PLATELET # BLD AUTO: 139 K/UL (ref 150–400)
PLATELET # BLD AUTO: 142 K/UL (ref 150–400)
PMV BLD AUTO: 10.2 FL (ref 8.9–12.9)
PMV BLD AUTO: 10.4 FL (ref 8.9–12.9)
PMV BLD AUTO: 9.8 FL (ref 8.9–12.9)
POTASSIUM SERPL-SCNC: 4.2 MMOL/L (ref 3.5–5.2)
POTASSIUM SERPL-SCNC: 4.2 MMOL/L (ref 3.5–5.2)
POTASSIUM SERPL-SCNC: 4.3 MMOL/L (ref 3.5–5.2)
POTASSIUM SERPL-SCNC: 4.5 MMOL/L (ref 3.5–5.1)
POTASSIUM SERPL-SCNC: 4.6 MMOL/L (ref 3.5–5.1)
POTASSIUM SERPL-SCNC: 4.6 MMOL/L (ref 3.5–5.2)
POTASSIUM SERPL-SCNC: 4.8 MMOL/L (ref 3.5–5.2)
POTASSIUM SERPL-SCNC: 4.8 MMOL/L (ref 3.5–5.2)
PROT SERPL-MCNC: 7 G/DL (ref 6.4–8.2)
PROT SERPL-MCNC: 7 G/DL (ref 6–8.5)
PROT UR STRIP-MCNC: 30 MG/DL
RBC # BLD AUTO: 4.5 M/UL (ref 4.1–5.7)
RBC # BLD AUTO: 4.59 M/UL (ref 4.1–5.7)
RBC # BLD AUTO: 4.71 M/UL (ref 4.1–5.7)
RBC #/AREA URNS HPF: ABNORMAL /HPF (ref 0–5)
RBC MORPH BLD: ABNORMAL
SERVICE CMNT-IMP: ABNORMAL
SERVICE CMNT-IMP: NORMAL
SODIUM SERPL-SCNC: 136 MMOL/L (ref 136–145)
SODIUM SERPL-SCNC: 136 MMOL/L (ref 136–145)
SODIUM SERPL-SCNC: 139 MMOL/L (ref 134–144)
SODIUM SERPL-SCNC: 140 MMOL/L (ref 134–144)
SODIUM SERPL-SCNC: 140 MMOL/L (ref 134–144)
SODIUM SERPL-SCNC: 142 MMOL/L (ref 134–144)
SODIUM SERPL-SCNC: 142 MMOL/L (ref 134–144)
SODIUM SERPL-SCNC: 143 MMOL/L (ref 134–144)
SP GR UR REFRACTOMETRY: 1.02 (ref 1–1.03)
T3 SERPL-MCNC: 89 NG/DL (ref 71–180)
T4 FREE SERPL-MCNC: 1.69 NG/DL (ref 0.82–1.77)
THYROPEROXIDASE AB SERPL-ACNC: 13 IU/ML (ref 0–34)
TSH RECEP AB SER-ACNC: <1.1 IU/L (ref 0–1.75)
TSH SERPL DL<=0.005 MIU/L-ACNC: 5.26 UIU/ML (ref 0.45–4.5)
TSI SER-ACNC: <0.1 IU/L (ref 0–0.55)
UA: UC IF INDICATED,UAUC: ABNORMAL
UROBILINOGEN UR QL STRIP.AUTO: 1 EU/DL (ref 0.2–1)
WBC # BLD AUTO: 7 K/UL (ref 4.1–11.1)
WBC # BLD AUTO: 7.6 K/UL (ref 4.1–11.1)
WBC # BLD AUTO: 8.6 K/UL (ref 4.1–11.1)
WBC URNS QL MICRO: ABNORMAL /HPF (ref 0–4)

## 2019-01-01 PROCEDURE — 36415 COLL VENOUS BLD VENIPUNCTURE: CPT

## 2019-01-01 PROCEDURE — C1769 GUIDE WIRE: HCPCS | Performed by: INTERNAL MEDICINE

## 2019-01-01 PROCEDURE — 82962 GLUCOSE BLOOD TEST: CPT

## 2019-01-01 PROCEDURE — C1887 CATHETER, GUIDING: HCPCS | Performed by: INTERNAL MEDICINE

## 2019-01-01 PROCEDURE — 99152 MOD SED SAME PHYS/QHP 5/>YRS: CPT | Performed by: SPECIALIST

## 2019-01-01 PROCEDURE — 93325 DOPPLER ECHO COLOR FLOW MAPG: CPT

## 2019-01-01 PROCEDURE — 74011000250 HC RX REV CODE- 250: Performed by: INTERNAL MEDICINE

## 2019-01-01 PROCEDURE — 74011000250 HC RX REV CODE- 250: Performed by: SPECIALIST

## 2019-01-01 PROCEDURE — 74011250636 HC RX REV CODE- 250/636: Performed by: NURSE ANESTHETIST, CERTIFIED REGISTERED

## 2019-01-01 PROCEDURE — 74011250636 HC RX REV CODE- 250/636: Performed by: SPECIALIST

## 2019-01-01 PROCEDURE — 81001 URINALYSIS AUTO W/SCOPE: CPT

## 2019-01-01 PROCEDURE — 83036 HEMOGLOBIN GLYCOSYLATED A1C: CPT

## 2019-01-01 PROCEDURE — 80053 COMPREHEN METABOLIC PANEL: CPT

## 2019-01-01 PROCEDURE — 99153 MOD SED SAME PHYS/QHP EA: CPT | Performed by: INTERNAL MEDICINE

## 2019-01-01 PROCEDURE — C1894 INTRO/SHEATH, NON-LASER: HCPCS | Performed by: INTERNAL MEDICINE

## 2019-01-01 PROCEDURE — 74011250636 HC RX REV CODE- 250/636: Performed by: INTERNAL MEDICINE

## 2019-01-01 PROCEDURE — 74011000250 HC RX REV CODE- 250: Performed by: NURSE ANESTHETIST, CERTIFIED REGISTERED

## 2019-01-01 PROCEDURE — 77030013992 HC SNR POLYP ENDOSC BSC -B: Performed by: INTERNAL MEDICINE

## 2019-01-01 PROCEDURE — 85025 COMPLETE CBC W/AUTO DIFF WBC: CPT

## 2019-01-01 PROCEDURE — 37224 HC PTA FEMPOP UNI: CPT | Performed by: INTERNAL MEDICINE

## 2019-01-01 PROCEDURE — 76060000033 HC ANESTHESIA 1 TO 1.5 HR

## 2019-01-01 PROCEDURE — 93312 ECHO TRANSESOPHAGEAL: CPT

## 2019-01-01 PROCEDURE — 99218 HC RM OBSERVATION: CPT

## 2019-01-01 PROCEDURE — 85027 COMPLETE CBC AUTOMATED: CPT

## 2019-01-01 PROCEDURE — 74011000258 HC RX REV CODE- 258: Performed by: INTERNAL MEDICINE

## 2019-01-01 PROCEDURE — 93320 DOPPLER ECHO COMPLETE: CPT

## 2019-01-01 PROCEDURE — 96374 THER/PROPH/DIAG INJ IV PUSH: CPT

## 2019-01-01 PROCEDURE — 74011636320 HC RX REV CODE- 636/320: Performed by: INTERNAL MEDICINE

## 2019-01-01 PROCEDURE — 75716 ARTERY X-RAYS ARMS/LEGS: CPT | Performed by: INTERNAL MEDICINE

## 2019-01-01 PROCEDURE — 74011250637 HC RX REV CODE- 250/637: Performed by: INTERNAL MEDICINE

## 2019-01-01 PROCEDURE — 71046 X-RAY EXAM CHEST 2 VIEWS: CPT

## 2019-01-01 PROCEDURE — 76040000007: Performed by: INTERNAL MEDICINE

## 2019-01-01 PROCEDURE — 74011636637 HC RX REV CODE- 636/637: Performed by: INTERNAL MEDICINE

## 2019-01-01 PROCEDURE — 76060000032 HC ANESTHESIA 0.5 TO 1 HR: Performed by: INTERNAL MEDICINE

## 2019-01-01 PROCEDURE — 99153 MOD SED SAME PHYS/QHP EA: CPT

## 2019-01-01 PROCEDURE — 80048 BASIC METABOLIC PNL TOTAL CA: CPT

## 2019-01-01 PROCEDURE — 77030013744: Performed by: INTERNAL MEDICINE

## 2019-01-01 PROCEDURE — 88305 TISSUE EXAM BY PATHOLOGIST: CPT

## 2019-01-01 PROCEDURE — 99152 MOD SED SAME PHYS/QHP 5/>YRS: CPT | Performed by: INTERNAL MEDICINE

## 2019-01-01 PROCEDURE — 77030010936 HC CLP LIG BSC -C: Performed by: INTERNAL MEDICINE

## 2019-01-01 PROCEDURE — 77030008684 HC TU ET CUF COVD -B: Performed by: ANESTHESIOLOGY

## 2019-01-01 PROCEDURE — 99152 MOD SED SAME PHYS/QHP 5/>YRS: CPT

## 2019-01-01 PROCEDURE — 77030021532 HC CATH ANGI DX IMPRS MRTM -B: Performed by: INTERNAL MEDICINE

## 2019-01-01 PROCEDURE — 77030026438 HC STYL ET INTUB CARD -A: Performed by: ANESTHESIOLOGY

## 2019-01-01 RX ORDER — TORSEMIDE 10 MG/1
10 TABLET ORAL DAILY
Status: ON HOLD | COMMUNITY
End: 2019-01-01

## 2019-01-01 RX ORDER — EPINEPHRINE 0.1 MG/ML
1 INJECTION INTRACARDIAC; INTRAVENOUS
Status: DISCONTINUED | OUTPATIENT
Start: 2019-01-01 | End: 2019-01-01 | Stop reason: HOSPADM

## 2019-01-01 RX ORDER — SODIUM CHLORIDE 9 MG/ML
1000 INJECTION, SOLUTION INTRAVENOUS CONTINUOUS
Status: DISCONTINUED | OUTPATIENT
Start: 2019-01-01 | End: 2019-01-01 | Stop reason: HOSPADM

## 2019-01-01 RX ORDER — FENTANYL CITRATE 50 UG/ML
25-200 INJECTION, SOLUTION INTRAMUSCULAR; INTRAVENOUS
Status: DISCONTINUED | OUTPATIENT
Start: 2019-01-01 | End: 2019-01-01 | Stop reason: HOSPADM

## 2019-01-01 RX ORDER — BISACODYL 5 MG
5 TABLET, DELAYED RELEASE (ENTERIC COATED) ORAL DAILY PRN
COMMUNITY
End: 2019-01-01

## 2019-01-01 RX ORDER — TORSEMIDE 10 MG/1
10 TABLET ORAL DAILY
Qty: 30 TAB | Refills: 0
Start: 2019-01-01 | End: 2019-01-01

## 2019-01-01 RX ORDER — POTASSIUM CHLORIDE 20 MEQ/1
TABLET, EXTENDED RELEASE ORAL
Qty: 60 TAB | Refills: 3 | Status: SHIPPED | OUTPATIENT
Start: 2019-01-01 | End: 2020-01-01 | Stop reason: SDUPTHER

## 2019-01-01 RX ORDER — SODIUM CHLORIDE 0.9 % (FLUSH) 0.9 %
5-40 SYRINGE (ML) INJECTION EVERY 8 HOURS
Status: DISCONTINUED | OUTPATIENT
Start: 2019-01-01 | End: 2019-01-01 | Stop reason: HOSPADM

## 2019-01-01 RX ORDER — SODIUM CHLORIDE 0.9 % (FLUSH) 0.9 %
5-40 SYRINGE (ML) INJECTION AS NEEDED
Status: CANCELLED | OUTPATIENT
Start: 2019-01-01

## 2019-01-01 RX ORDER — LIDOCAINE 50 MG/G
OINTMENT TOPICAL AS NEEDED
Status: DISCONTINUED | OUTPATIENT
Start: 2019-01-01 | End: 2019-01-01 | Stop reason: HOSPADM

## 2019-01-01 RX ORDER — LIDOCAINE HYDROCHLORIDE 20 MG/ML
JELLY TOPICAL
Status: COMPLETED | OUTPATIENT
Start: 2019-01-01 | End: 2019-01-01

## 2019-01-01 RX ORDER — MIDAZOLAM HYDROCHLORIDE 1 MG/ML
.5-1 INJECTION, SOLUTION INTRAMUSCULAR; INTRAVENOUS
Status: DISCONTINUED | OUTPATIENT
Start: 2019-01-01 | End: 2019-01-01 | Stop reason: HOSPADM

## 2019-01-01 RX ORDER — LIDOCAINE HYDROCHLORIDE 10 MG/ML
INJECTION INFILTRATION; PERINEURAL AS NEEDED
Status: DISCONTINUED | OUTPATIENT
Start: 2019-01-01 | End: 2019-01-01 | Stop reason: HOSPADM

## 2019-01-01 RX ORDER — INSULIN LISPRO 100 [IU]/ML
INJECTION, SOLUTION INTRAVENOUS; SUBCUTANEOUS
Status: DISCONTINUED | OUTPATIENT
Start: 2019-01-01 | End: 2019-01-01 | Stop reason: HOSPADM

## 2019-01-01 RX ORDER — METOPROLOL TARTRATE 5 MG/5ML
INJECTION INTRAVENOUS AS NEEDED
Status: DISCONTINUED | OUTPATIENT
Start: 2019-01-01 | End: 2019-01-01 | Stop reason: HOSPADM

## 2019-01-01 RX ORDER — FLUMAZENIL 0.1 MG/ML
0.2 INJECTION INTRAVENOUS
Status: DISCONTINUED | OUTPATIENT
Start: 2019-01-01 | End: 2019-01-01 | Stop reason: HOSPADM

## 2019-01-01 RX ORDER — TORSEMIDE 20 MG/1
10 TABLET ORAL AS NEEDED
Qty: 180 TAB | Refills: 1 | Status: SHIPPED | OUTPATIENT
Start: 2019-01-01 | End: 2020-01-01 | Stop reason: SDUPTHER

## 2019-01-01 RX ORDER — POTASSIUM CHLORIDE 750 MG/1
20 TABLET, FILM COATED, EXTENDED RELEASE ORAL 2 TIMES DAILY
Status: DISCONTINUED | OUTPATIENT
Start: 2019-01-01 | End: 2019-01-01 | Stop reason: HOSPADM

## 2019-01-01 RX ORDER — PROPOFOL 10 MG/ML
INJECTION, EMULSION INTRAVENOUS AS NEEDED
Status: DISCONTINUED | OUTPATIENT
Start: 2019-01-01 | End: 2019-01-01 | Stop reason: HOSPADM

## 2019-01-01 RX ORDER — SODIUM CHLORIDE 0.9 % (FLUSH) 0.9 %
5-40 SYRINGE (ML) INJECTION EVERY 8 HOURS
Status: CANCELLED | OUTPATIENT
Start: 2019-01-01

## 2019-01-01 RX ORDER — METOPROLOL SUCCINATE 50 MG/1
50 TABLET, EXTENDED RELEASE ORAL DAILY
Status: DISCONTINUED | OUTPATIENT
Start: 2019-01-01 | End: 2019-01-01 | Stop reason: HOSPADM

## 2019-01-01 RX ORDER — LIDOCAINE HYDROCHLORIDE 20 MG/ML
JELLY TOPICAL ONCE
Status: COMPLETED | OUTPATIENT
Start: 2019-01-01 | End: 2019-01-01

## 2019-01-01 RX ORDER — SODIUM CHLORIDE 9 MG/ML
100 INJECTION, SOLUTION INTRAVENOUS CONTINUOUS
Status: DISCONTINUED | OUTPATIENT
Start: 2019-01-01 | End: 2019-01-01 | Stop reason: HOSPADM

## 2019-01-01 RX ORDER — GUAIFENESIN 100 MG/5ML
81 LIQUID (ML) ORAL DAILY
Status: DISCONTINUED | OUTPATIENT
Start: 2019-01-01 | End: 2019-01-01 | Stop reason: HOSPADM

## 2019-01-01 RX ORDER — SODIUM CHLORIDE 0.9 % (FLUSH) 0.9 %
5-40 SYRINGE (ML) INJECTION AS NEEDED
Status: DISCONTINUED | OUTPATIENT
Start: 2019-01-01 | End: 2019-01-01 | Stop reason: HOSPADM

## 2019-01-01 RX ORDER — TORSEMIDE 20 MG/1
10 TABLET ORAL DAILY
Qty: 180 TAB | Refills: 1 | Status: SHIPPED | OUTPATIENT
Start: 2019-01-01 | End: 2019-01-01

## 2019-01-01 RX ORDER — INSULIN GLARGINE 100 [IU]/ML
INJECTION, SOLUTION SUBCUTANEOUS
Qty: 5 PEN | Refills: 1 | Status: ON HOLD | OUTPATIENT
Start: 2019-01-01 | End: 2019-01-01

## 2019-01-01 RX ORDER — TORSEMIDE 20 MG/1
10 TABLET ORAL DAILY PRN
Status: DISCONTINUED | OUTPATIENT
Start: 2019-01-01 | End: 2019-01-01 | Stop reason: HOSPADM

## 2019-01-01 RX ORDER — MIDAZOLAM HYDROCHLORIDE 1 MG/ML
INJECTION, SOLUTION INTRAMUSCULAR; INTRAVENOUS AS NEEDED
Status: DISCONTINUED | OUTPATIENT
Start: 2019-01-01 | End: 2019-01-01 | Stop reason: HOSPADM

## 2019-01-01 RX ORDER — SODIUM CHLORIDE 9 MG/ML
10 INJECTION INTRAMUSCULAR; INTRAVENOUS; SUBCUTANEOUS
Status: DISCONTINUED | OUTPATIENT
Start: 2019-01-01 | End: 2019-01-01 | Stop reason: HOSPADM

## 2019-01-01 RX ORDER — ONDANSETRON 2 MG/ML
4 INJECTION INTRAMUSCULAR; INTRAVENOUS
Status: DISCONTINUED | OUTPATIENT
Start: 2019-01-01 | End: 2019-01-01 | Stop reason: HOSPADM

## 2019-01-01 RX ORDER — LIDOCAINE HYDROCHLORIDE 20 MG/ML
15 SOLUTION OROPHARYNGEAL
Status: COMPLETED | OUTPATIENT
Start: 2019-01-01 | End: 2019-01-01

## 2019-01-01 RX ORDER — NALOXONE HYDROCHLORIDE 0.4 MG/ML
0.4 INJECTION, SOLUTION INTRAMUSCULAR; INTRAVENOUS; SUBCUTANEOUS
Status: DISCONTINUED | OUTPATIENT
Start: 2019-01-01 | End: 2019-01-01 | Stop reason: HOSPADM

## 2019-01-01 RX ORDER — MAGNESIUM SULFATE 100 %
4 CRYSTALS MISCELLANEOUS AS NEEDED
Status: DISCONTINUED | OUTPATIENT
Start: 2019-01-01 | End: 2019-01-01 | Stop reason: HOSPADM

## 2019-01-01 RX ORDER — INSULIN GLARGINE 100 [IU]/ML
10 INJECTION, SOLUTION SUBCUTANEOUS
Status: DISCONTINUED | OUTPATIENT
Start: 2019-01-01 | End: 2019-01-01 | Stop reason: HOSPADM

## 2019-01-01 RX ORDER — MIDAZOLAM HYDROCHLORIDE 1 MG/ML
.25-5 INJECTION, SOLUTION INTRAMUSCULAR; INTRAVENOUS
Status: DISCONTINUED | OUTPATIENT
Start: 2019-01-01 | End: 2019-01-01 | Stop reason: HOSPADM

## 2019-01-01 RX ORDER — CHOLECALCIFEROL (VITAMIN D3) 125 MCG
5000 CAPSULE ORAL DAILY
Qty: 30 CAP | Refills: 2 | Status: SHIPPED | OUTPATIENT
Start: 2019-01-01 | End: 2019-01-01 | Stop reason: SDUPTHER

## 2019-01-01 RX ORDER — DEXAMETHASONE SODIUM PHOSPHATE 4 MG/ML
INJECTION, SOLUTION INTRA-ARTICULAR; INTRALESIONAL; INTRAMUSCULAR; INTRAVENOUS; SOFT TISSUE AS NEEDED
Status: DISCONTINUED | OUTPATIENT
Start: 2019-01-01 | End: 2019-01-01 | Stop reason: HOSPADM

## 2019-01-01 RX ORDER — FENTANYL CITRATE 50 UG/ML
INJECTION, SOLUTION INTRAMUSCULAR; INTRAVENOUS AS NEEDED
Status: DISCONTINUED | OUTPATIENT
Start: 2019-01-01 | End: 2019-01-01 | Stop reason: HOSPADM

## 2019-01-01 RX ORDER — SODIUM CHLORIDE 9 MG/ML
25 INJECTION, SOLUTION INTRAVENOUS CONTINUOUS
Status: DISCONTINUED | OUTPATIENT
Start: 2019-01-01 | End: 2019-01-01 | Stop reason: HOSPADM

## 2019-01-01 RX ORDER — DIPHENHYDRAMINE HYDROCHLORIDE 50 MG/ML
INJECTION, SOLUTION INTRAMUSCULAR; INTRAVENOUS AS NEEDED
Status: DISCONTINUED | OUTPATIENT
Start: 2019-01-01 | End: 2019-01-01 | Stop reason: HOSPADM

## 2019-01-01 RX ORDER — LIDOCAINE HYDROCHLORIDE 20 MG/ML
INJECTION, SOLUTION EPIDURAL; INFILTRATION; INTRACAUDAL; PERINEURAL AS NEEDED
Status: DISCONTINUED | OUTPATIENT
Start: 2019-01-01 | End: 2019-01-01 | Stop reason: HOSPADM

## 2019-01-01 RX ORDER — ESMOLOL HYDROCHLORIDE 10 MG/ML
INJECTION INTRAVENOUS AS NEEDED
Status: DISCONTINUED | OUTPATIENT
Start: 2019-01-01 | End: 2019-01-01 | Stop reason: HOSPADM

## 2019-01-01 RX ORDER — ONDANSETRON 2 MG/ML
INJECTION INTRAMUSCULAR; INTRAVENOUS AS NEEDED
Status: DISCONTINUED | OUTPATIENT
Start: 2019-01-01 | End: 2019-01-01 | Stop reason: HOSPADM

## 2019-01-01 RX ORDER — CHOLECALCIFEROL (VITAMIN D3) 125 MCG
5000 CAPSULE ORAL DAILY
Qty: 30 CAP | Refills: 2 | Status: SHIPPED | OUTPATIENT
Start: 2019-01-01 | End: 2020-01-01 | Stop reason: DRUGHIGH

## 2019-01-01 RX ORDER — LIDOCAINE HYDROCHLORIDE 20 MG/ML
15 SOLUTION OROPHARYNGEAL AS NEEDED
Status: DISCONTINUED | OUTPATIENT
Start: 2019-01-01 | End: 2019-01-01 | Stop reason: HOSPADM

## 2019-01-01 RX ORDER — HYDROCODONE BITARTRATE AND ACETAMINOPHEN 5; 325 MG/1; MG/1
1 TABLET ORAL
Status: DISCONTINUED | OUTPATIENT
Start: 2019-01-01 | End: 2019-01-01 | Stop reason: HOSPADM

## 2019-01-01 RX ORDER — ROCURONIUM BROMIDE 10 MG/ML
INJECTION, SOLUTION INTRAVENOUS AS NEEDED
Status: DISCONTINUED | OUTPATIENT
Start: 2019-01-01 | End: 2019-01-01 | Stop reason: HOSPADM

## 2019-01-01 RX ORDER — MAGNESIUM CITRATE
296 SOLUTION, ORAL ORAL
COMMUNITY
End: 2019-01-01

## 2019-01-01 RX ORDER — INSULIN GLARGINE 100 [IU]/ML
10 INJECTION, SOLUTION SUBCUTANEOUS
Status: DISCONTINUED | OUTPATIENT
Start: 2019-01-01 | End: 2019-01-01

## 2019-01-01 RX ORDER — CEFAZOLIN SODIUM/WATER 2 G/20 ML
2 SYRINGE (ML) INTRAVENOUS ONCE
Status: COMPLETED | OUTPATIENT
Start: 2019-01-01 | End: 2019-01-01

## 2019-01-01 RX ORDER — INSULIN LISPRO 100 [IU]/ML
INJECTION, SOLUTION INTRAVENOUS; SUBCUTANEOUS
Status: DISCONTINUED | OUTPATIENT
Start: 2019-01-01 | End: 2019-01-01

## 2019-01-01 RX ORDER — MELATONIN
5000 DAILY
Status: DISCONTINUED | OUTPATIENT
Start: 2019-01-01 | End: 2019-01-01 | Stop reason: HOSPADM

## 2019-01-01 RX ORDER — LORATADINE 10 MG/1
10 TABLET ORAL DAILY
COMMUNITY
End: 2019-01-01

## 2019-01-01 RX ORDER — POLYETHYLENE GLYCOL 3350, SODIUM CHLORIDE, POTASSIUM CHLORIDE, SODIUM BICARBONATE, AND SODIUM SULFATE 240; 5.84; 2.98; 6.72; 22.72 G/4L; G/4L; G/4L; G/4L; G/4L
POWDER, FOR SOLUTION ORAL
Status: ON HOLD | COMMUNITY
Start: 2019-01-01 | End: 2019-01-01 | Stop reason: CLARIF

## 2019-01-01 RX ORDER — ATROPINE SULFATE 0.1 MG/ML
0.5 INJECTION INTRAVENOUS
Status: DISCONTINUED | OUTPATIENT
Start: 2019-01-01 | End: 2019-01-01 | Stop reason: HOSPADM

## 2019-01-01 RX ORDER — DIPHENHYDRAMINE HYDROCHLORIDE 50 MG/ML
25 INJECTION, SOLUTION INTRAMUSCULAR; INTRAVENOUS
Status: COMPLETED | OUTPATIENT
Start: 2019-01-01 | End: 2019-01-01

## 2019-01-01 RX ORDER — SODIUM CHLORIDE 9 MG/ML
50 INJECTION, SOLUTION INTRAVENOUS CONTINUOUS
Status: DISCONTINUED | OUTPATIENT
Start: 2019-01-01 | End: 2019-01-01 | Stop reason: HOSPADM

## 2019-01-01 RX ORDER — DIPHENHYDRAMINE HYDROCHLORIDE 50 MG/ML
25 INJECTION, SOLUTION INTRAMUSCULAR; INTRAVENOUS
Status: CANCELLED | OUTPATIENT
Start: 2019-01-01 | End: 2019-01-01

## 2019-01-01 RX ORDER — DEXTROMETHORPHAN/PSEUDOEPHED 2.5-7.5/.8
1.2 DROPS ORAL
Status: DISCONTINUED | OUTPATIENT
Start: 2019-01-01 | End: 2019-01-01 | Stop reason: HOSPADM

## 2019-01-01 RX ORDER — INSULIN GLARGINE 100 [IU]/ML
8 INJECTION, SOLUTION SUBCUTANEOUS
Status: DISCONTINUED | OUTPATIENT
Start: 2019-01-01 | End: 2019-01-01

## 2019-01-01 RX ORDER — INSULIN GLARGINE 100 [IU]/ML
INJECTION, SOLUTION SUBCUTANEOUS
Qty: 5 PEN | Refills: 2 | Status: SHIPPED | OUTPATIENT
Start: 2019-01-01 | End: 2019-01-01

## 2019-01-01 RX ORDER — SODIUM CHLORIDE 9 MG/ML
100 INJECTION, SOLUTION INTRAVENOUS CONTINUOUS
Status: CANCELLED | OUTPATIENT
Start: 2019-01-01

## 2019-01-01 RX ORDER — MORPHINE SULFATE 10 MG/ML
2 INJECTION, SOLUTION INTRAMUSCULAR; INTRAVENOUS
Status: DISCONTINUED | OUTPATIENT
Start: 2019-01-01 | End: 2019-01-01 | Stop reason: HOSPADM

## 2019-01-01 RX ORDER — LIDOCAINE 50 MG/G
OINTMENT TOPICAL
Status: DISCONTINUED | OUTPATIENT
Start: 2019-01-01 | End: 2019-01-01 | Stop reason: HOSPADM

## 2019-01-01 RX ORDER — METOPROLOL SUCCINATE 50 MG/1
50 TABLET, EXTENDED RELEASE ORAL DAILY
Qty: 90 TAB | Refills: 3 | Status: SHIPPED | OUTPATIENT
Start: 2019-01-01 | End: 2020-01-01

## 2019-01-01 RX ORDER — TORSEMIDE 20 MG/1
10 TABLET ORAL AS NEEDED
Qty: 180 TAB | Refills: 1
Start: 2019-01-01 | End: 2019-01-01

## 2019-01-01 RX ORDER — SUCCINYLCHOLINE CHLORIDE 20 MG/ML
INJECTION INTRAMUSCULAR; INTRAVENOUS AS NEEDED
Status: DISCONTINUED | OUTPATIENT
Start: 2019-01-01 | End: 2019-01-01 | Stop reason: HOSPADM

## 2019-01-01 RX ORDER — ATORVASTATIN CALCIUM 40 MG/1
40 TABLET, FILM COATED ORAL DAILY
Status: DISCONTINUED | OUTPATIENT
Start: 2019-01-01 | End: 2019-01-01 | Stop reason: HOSPADM

## 2019-01-01 RX ORDER — CLOPIDOGREL BISULFATE 75 MG/1
TABLET ORAL
COMMUNITY
End: 2019-01-01

## 2019-01-01 RX ORDER — DEXTROSE MONOHYDRATE 100 MG/ML
0-250 INJECTION, SOLUTION INTRAVENOUS AS NEEDED
Status: DISCONTINUED | OUTPATIENT
Start: 2019-01-01 | End: 2019-01-01 | Stop reason: HOSPADM

## 2019-01-01 RX ORDER — SODIUM CHLORIDE 9 MG/ML
INJECTION, SOLUTION INTRAVENOUS
Status: DISCONTINUED | OUTPATIENT
Start: 2019-01-01 | End: 2019-01-01 | Stop reason: HOSPADM

## 2019-01-01 RX ADMIN — FENTANYL CITRATE 50 MCG: 50 INJECTION, SOLUTION INTRAMUSCULAR; INTRAVENOUS at 12:59

## 2019-01-01 RX ADMIN — SACUBITRIL AND VALSARTAN 1 TABLET: 49; 51 TABLET, FILM COATED ORAL at 20:05

## 2019-01-01 RX ADMIN — METOPROLOL SUCCINATE 50 MG: 50 TABLET, EXTENDED RELEASE ORAL at 08:49

## 2019-01-01 RX ADMIN — LIDOCAINE HYDROCHLORIDE 40 MG: 20 INJECTION, SOLUTION EPIDURAL; INFILTRATION; INTRACAUDAL; PERINEURAL at 08:54

## 2019-01-01 RX ADMIN — HYDROCODONE BITARTRATE AND ACETAMINOPHEN 1 TABLET: 5; 325 TABLET ORAL at 01:27

## 2019-01-01 RX ADMIN — PROPOFOL 50 MG: 10 INJECTION, EMULSION INTRAVENOUS at 14:15

## 2019-01-01 RX ADMIN — SODIUM CHLORIDE 500 ML: 900 INJECTION, SOLUTION INTRAVENOUS at 17:00

## 2019-01-01 RX ADMIN — MELATONIN 5 TABLET: at 08:48

## 2019-01-01 RX ADMIN — LIDOCAINE HYDROCHLORIDE: 20 JELLY TOPICAL at 13:06

## 2019-01-01 RX ADMIN — MORPHINE SULFATE 2 MG: 10 INJECTION INTRAVENOUS at 01:49

## 2019-01-01 RX ADMIN — POTASSIUM CHLORIDE 20 MEQ: 750 TABLET, FILM COATED, EXTENDED RELEASE ORAL at 08:49

## 2019-01-01 RX ADMIN — MIDAZOLAM 2 MG: 1 INJECTION INTRAMUSCULAR; INTRAVENOUS at 08:02

## 2019-01-01 RX ADMIN — LIDOCAINE HYDROCHLORIDE 2 ML: 20 JELLY TOPICAL at 16:01

## 2019-01-01 RX ADMIN — LIDOCAINE: 50 OINTMENT TOPICAL at 15:43

## 2019-01-01 RX ADMIN — PROPOFOL 100 MG: 10 INJECTION, EMULSION INTRAVENOUS at 08:54

## 2019-01-01 RX ADMIN — SODIUM CHLORIDE 25 ML/HR: 900 INJECTION, SOLUTION INTRAVENOUS at 07:27

## 2019-01-01 RX ADMIN — LIDOCAINE HYDROCHLORIDE 15 ML: 20 SOLUTION ORAL; TOPICAL at 12:41

## 2019-01-01 RX ADMIN — Medication 10 ML: at 05:42

## 2019-01-01 RX ADMIN — MIDAZOLAM 1 MG: 1 INJECTION INTRAMUSCULAR; INTRAVENOUS at 12:59

## 2019-01-01 RX ADMIN — HYDROCODONE BITARTRATE AND ACETAMINOPHEN 1 TABLET: 5; 325 TABLET ORAL at 05:41

## 2019-01-01 RX ADMIN — MIDAZOLAM 1 MG: 1 INJECTION INTRAMUSCULAR; INTRAVENOUS at 15:47

## 2019-01-01 RX ADMIN — INSULIN GLARGINE 10 UNITS: 100 INJECTION, SOLUTION SUBCUTANEOUS at 20:06

## 2019-01-01 RX ADMIN — Medication 10 ML: at 21:29

## 2019-01-01 RX ADMIN — INSULIN LISPRO 2 UNITS: 100 INJECTION, SOLUTION INTRAVENOUS; SUBCUTANEOUS at 06:47

## 2019-01-01 RX ADMIN — PROPOFOL 50 MG: 10 INJECTION, EMULSION INTRAVENOUS at 14:23

## 2019-01-01 RX ADMIN — ESMOLOL HYDROCHLORIDE 20 MG: 10 INJECTION, SOLUTION INTRAVENOUS at 09:20

## 2019-01-01 RX ADMIN — MIDAZOLAM 1 MG: 1 INJECTION INTRAMUSCULAR; INTRAVENOUS at 13:05

## 2019-01-01 RX ADMIN — MIDAZOLAM 2 MG: 1 INJECTION INTRAMUSCULAR; INTRAVENOUS at 15:51

## 2019-01-01 RX ADMIN — LIDOCAINE: 50 OINTMENT TOPICAL at 15:35

## 2019-01-01 RX ADMIN — PROPOFOL 30 MG: 10 INJECTION, EMULSION INTRAVENOUS at 14:30

## 2019-01-01 RX ADMIN — ASPIRIN 81 MG 81 MG: 81 TABLET ORAL at 08:49

## 2019-01-01 RX ADMIN — METOPROLOL TARTRATE 2 MG: 5 INJECTION, SOLUTION INTRAVENOUS at 09:22

## 2019-01-01 RX ADMIN — INSULIN LISPRO 2 UNITS: 100 INJECTION, SOLUTION INTRAVENOUS; SUBCUTANEOUS at 21:29

## 2019-01-01 RX ADMIN — LIDOCAINE: 50 OINTMENT TOPICAL at 12:46

## 2019-01-01 RX ADMIN — PROPOFOL 50 MG: 10 INJECTION, EMULSION INTRAVENOUS at 14:35

## 2019-01-01 RX ADMIN — ONDANSETRON HYDROCHLORIDE 4 MG: 2 INJECTION, SOLUTION INTRAMUSCULAR; INTRAVENOUS at 09:14

## 2019-01-01 RX ADMIN — MIDAZOLAM 0.5 MG: 1 INJECTION INTRAMUSCULAR; INTRAVENOUS at 13:14

## 2019-01-01 RX ADMIN — ESMOLOL HYDROCHLORIDE 20 MG: 10 INJECTION, SOLUTION INTRAVENOUS at 08:47

## 2019-01-01 RX ADMIN — SODIUM CHLORIDE 100 ML/HR: 900 INJECTION, SOLUTION INTRAVENOUS at 09:50

## 2019-01-01 RX ADMIN — ATORVASTATIN CALCIUM 40 MG: 40 TABLET, FILM COATED ORAL at 08:49

## 2019-01-01 RX ADMIN — SUCCINYLCHOLINE CHLORIDE 120 MG: 20 INJECTION, SOLUTION INTRAMUSCULAR; INTRAVENOUS at 08:55

## 2019-01-01 RX ADMIN — HYDROCODONE BITARTRATE AND ACETAMINOPHEN 1 TABLET: 5; 325 TABLET ORAL at 20:05

## 2019-01-01 RX ADMIN — PROPOFOL 40 MG: 10 INJECTION, EMULSION INTRAVENOUS at 08:55

## 2019-01-01 RX ADMIN — APIXABAN 5 MG: 5 TABLET, FILM COATED ORAL at 10:27

## 2019-01-01 RX ADMIN — FENTANYL CITRATE 50 MCG: 50 INJECTION, SOLUTION INTRAMUSCULAR; INTRAVENOUS at 15:46

## 2019-01-01 RX ADMIN — PROPOFOL 100 MG: 10 INJECTION, EMULSION INTRAVENOUS at 14:12

## 2019-01-01 RX ADMIN — MIDAZOLAM 0.5 MG: 1 INJECTION INTRAMUSCULAR; INTRAVENOUS at 13:19

## 2019-01-01 RX ADMIN — FENTANYL CITRATE 50 MCG: 50 INJECTION, SOLUTION INTRAMUSCULAR; INTRAVENOUS at 09:19

## 2019-01-01 RX ADMIN — Medication 2 G: at 08:50

## 2019-01-01 RX ADMIN — SODIUM CHLORIDE: 900 INJECTION, SOLUTION INTRAVENOUS at 14:04

## 2019-01-01 RX ADMIN — PROPOFOL 50 MG: 10 INJECTION, EMULSION INTRAVENOUS at 14:41

## 2019-01-01 RX ADMIN — FENTANYL CITRATE 50 MCG: 50 INJECTION, SOLUTION INTRAMUSCULAR; INTRAVENOUS at 08:54

## 2019-01-01 RX ADMIN — SACUBITRIL AND VALSARTAN 1 TABLET: 49; 51 TABLET, FILM COATED ORAL at 08:49

## 2019-01-01 RX ADMIN — MORPHINE SULFATE 2 MG: 10 INJECTION INTRAVENOUS at 17:08

## 2019-01-01 RX ADMIN — ROCURONIUM BROMIDE 5 MG: 10 SOLUTION INTRAVENOUS at 08:54

## 2019-01-01 RX ADMIN — SODIUM CHLORIDE 10 ML: 9 INJECTION INTRAMUSCULAR; INTRAVENOUS; SUBCUTANEOUS at 13:21

## 2019-01-01 RX ADMIN — LIDOCAINE HYDROCHLORIDE 15 ML: 20 SOLUTION ORAL; TOPICAL at 15:32

## 2019-01-01 RX ADMIN — MIDAZOLAM 1 MG: 1 INJECTION INTRAMUSCULAR; INTRAVENOUS at 13:03

## 2019-01-01 RX ADMIN — POTASSIUM CHLORIDE 20 MEQ: 750 TABLET, FILM COATED, EXTENDED RELEASE ORAL at 20:05

## 2019-01-01 RX ADMIN — DEXAMETHASONE SODIUM PHOSPHATE 4 MG: 4 INJECTION, SOLUTION INTRAMUSCULAR; INTRAVENOUS at 09:14

## 2019-01-01 RX ADMIN — FENTANYL CITRATE 100 MCG: 50 INJECTION, SOLUTION INTRAMUSCULAR; INTRAVENOUS at 08:15

## 2019-01-08 ENCOUNTER — OFFICE VISIT (OUTPATIENT)
Dept: CARDIOLOGY CLINIC | Age: 69
End: 2019-01-08

## 2019-01-08 ENCOUNTER — TELEPHONE (OUTPATIENT)
Dept: CARDIOLOGY CLINIC | Age: 69
End: 2019-01-08

## 2019-01-08 VITALS
DIASTOLIC BLOOD PRESSURE: 72 MMHG | TEMPERATURE: 96.1 F | BODY MASS INDEX: 29.01 KG/M2 | HEIGHT: 70 IN | WEIGHT: 202.6 LBS | HEART RATE: 64 BPM | SYSTOLIC BLOOD PRESSURE: 114 MMHG | OXYGEN SATURATION: 98 % | RESPIRATION RATE: 20 BRPM

## 2019-01-08 DIAGNOSIS — R06.09 DOE (DYSPNEA ON EXERTION): ICD-10-CM

## 2019-01-08 DIAGNOSIS — I50.22 CHRONIC SYSTOLIC CONGESTIVE HEART FAILURE, NYHA CLASS 3 (HCC): ICD-10-CM

## 2019-01-08 DIAGNOSIS — I50.22 SYSTOLIC CHF, CHRONIC (HCC): ICD-10-CM

## 2019-01-08 DIAGNOSIS — Z95.810 ICD (IMPLANTABLE CARDIOVERTER-DEFIBRILLATOR) IN PLACE: ICD-10-CM

## 2019-01-08 DIAGNOSIS — I73.9 PERIPHERAL VASCULAR DISEASE (HCC): ICD-10-CM

## 2019-01-08 DIAGNOSIS — I25.5 ISCHEMIC CARDIOMYOPATHY: Primary | ICD-10-CM

## 2019-01-08 DIAGNOSIS — N18.30 CHRONIC RENAL IMPAIRMENT, STAGE 3 (MODERATE) (HCC): ICD-10-CM

## 2019-01-08 DIAGNOSIS — E11.8 TYPE 2 DIABETES MELLITUS WITH COMPLICATION, UNSPECIFIED WHETHER LONG TERM INSULIN USE: ICD-10-CM

## 2019-01-08 PROBLEM — I50.20 SYSTOLIC HEART FAILURE, ACC/AHA STAGE C (HCC): Status: ACTIVE | Noted: 2017-08-17

## 2019-01-08 NOTE — LETTER
1/8/2019 2:49 PM 
 
Patient:  Cori Padilla. YOB: 1950 Date of Visit: 1/8/2019 Dear Kenney Aden MD 
[address] [address] [facility] [address] VIA In Basket Renata Khan MD 
[facility] [address] [address] VIA Facsimile: [phone] Massimo Parks MD 
[address] [facility] [address] [address] [address] VIA In Basket Guy Kaur MD 
[address] [facility] [address] [address] VIA In Basket Lena Guajardo MD 
[facility] [address] [address] VIA Facsimile: [phone] 
 : 
 
 
Thank you for referring Mr. Tyree Tran to me for evaluation/treatment. Below are the relevant portions of my assessment and plan of care. If you have questions, please do not hesitate to call me. I look forward to following Mr. Galen Thomas along with you. Sincerely, JENI Pelayo

## 2019-01-08 NOTE — PATIENT INSTRUCTIONS
You are stable        CONTINUE CURRENT medications    Take twice a day medications 12 hours apart with food    Labs today BMP, proBNP     See Dr. Coleman Arrieta for your legs Andreas Lazcanoire [phone]  Cardiac rehab referral     Limit WHITE foods (flour, sugar, pasta, rice, potatoes)    Walk more  Decrease smoking     Keep a positive attitude       HF Education: Continue daily weights (in the morning, after voiding). Notify HF team of overnight weight gains > 2 lbs or weekly >5 lbs or if any of the following Sx. Continue to limit sodium intake & monitor your fluid intake .

## 2019-01-08 NOTE — PROGRESS NOTES
[facility]  Note      DOS:  1/8/2019     PRIMARY CARE PHYSICIAN: Nick Kim MD  CARDIOLOGIST: Dr. Lourdes Crowder   EP: Janeann Aschoff, MD       Impression/Plan:    Chronic systolic heart failure (EF 25%), d/t ICM, ACC/AHA , NYHA class III             Continue the Entresto 97/103 mg BID             Continue  Toprol  mg daily             Hold on aldactone for now hx hyperkalemia while on GDMT, and he does not want to take valtessa              Prn bumex- has not needed    Labs today BMP, proBNP   Referred for Cardiac rehab     Continue daily weights, sodium and fluid restriction   Follow  Up in clinic in 1 month    Encouraged to stop smoking- decrease by 1/2        HTN BP better on entresto and toprol XL    Continue meds     PAD- charles showed significant PAD,not amenable to  PTCA and the recommendations are for vascular surgery.  He would like to see a vascular surgeon at [facility]    Refer to  Dr. Corrinne Aase or Dr. Jonah Claros as pt  would like a surgeon at [facility]      Encouraged to exercise and referred to  cardiac rehab   Smoking cessation   Encouraged to resume DAPT, statin -once medicare part D kicks in    CAD s/p PCI-    Appointment with Dr. Lidia Alfaro in March     Continue ASA, BB               Off plavix and statin 2/2 no part D  - encouraged to see [employer],  [employer], or use cover my meds      PAF-- remains SR- Now off Amiodarone     Cont BB   Dr Ninoska Edmonds to follow    No anticoag 2/2 GI bleed/AVMs    Recurrent tobacco use    Counseled on smoking cessation   Nicoderm patch       Incomplete LBBB  msec    CKD- Cr better  1.28   Monitor    Suspect KELLY and COPD            Encouraged him to see pulmonary for eval of COPD and sleep study- he has declined    Proventil  Inhaler prn -     Diabetes recent A1c 7.1    Insulin management by PCP             Pt has declined Dillan Shaw- per PCP/cardiology     H/O lower GIB/s/p colorectal surgery negative genetics Paraesthesias/neuropathy 2/2 DM, PAD    Vitamin B levels OK     Med Non-compliance       Pt asst from [employer] Encouraged to check on med cost at [place], cover my meds or [employer]              Thank you for allowing me to participate in the care of this delightful  gentleman. Please do not hesitate to call with any questions. Opal Saavedra RN, [facility], [facility]       Chief Complaint   Patient presents with    Follow-up    Chest Congestion    Cough     productive of clear sputum     HPI: Stiven Daugherty is a 74y.o. year old  male with a history of chronic HFrEF (EF 25%) secondary to ICM- CAD s/p PCI of LM,  of RCA s/p AICD complicated by  PAF (amiodarone) , Hep C, GI bleed (s/p colorectal surgery), and remote tobacco abuse. He presents to clinic today for follow up of his heart failure. His last ECHO  7/2018 showed : LVD, EF 25%, severe GHK, AK  basal-mid inferior wall. G1DD, mild- mod LAE, mild MR/TR, RVSP 42 mmHg. AO root dilation 4.3. CATH: (1/20/17): LM ost70. LAD m50. D1 80. LCx d70; OM1 99, OM2 90. RCA p100. No AVG. PAP 53/27/36. s/p PCI (2/9/17): pRCA 2.5x38 Xience + 2.75x12 Xience. ostLM 4.0x12 Xience post-dil with 4.5x12 NC. CARDIAC MRI 1/31/17:LVEF 10%, LVH, RV dilated RVEF 25% GHK, marked LAE, mod CB, mild MR, mod-severe TR     He was last seen 1 month ago. Cr 1.28, K 4.4  Since his last visit he has seen Dr. Iggy Bowling who recommended he see a nephrologist for his proteinuria   He has also sustained a GLF when he missed the curb and cracked a rib. He has been compliant with Entresto and Torpol XL, he remains off of aldactone and valtessa. Mr. Brionna Bradford reports stable NAVARRO with moderate exertion, prolonged walking, stair climbing. Fatigue waxes and wanes. His activity is mostly limited by claudication. Recent bilateral hip pain he relates to the weather change    He continues to smoke  ~ 1 ppd.     He now has Welcare so would like to see a vascular surgeon at [facility]      He sleeps well, 8-10 hours,   + Nocturia x 1, (+)  Snoring -  Naps ~ 3 times per week. His weight is stable 197  at home,  has lost 10 # according to his home scale. He is diuresing with Entresto. He tries  to eat in moderation. Does not add salt, eats out 3-4 times per week. BP at home improving 117/72          Fernanda Rowell. (Sp[place]) lives with his wife. He recently welcomed his 7th great grandchild. He is retired from industrial pipe welding. He smoked 50 years- quit 1/2017, and has resumed. occasional etoh. He enjoys old movies, visiting with is 2 adult children and grand children. 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 Review of Systems: :    General:  Positive for fatigue, Denies fever, chills, negative  HEENT: Denies epistaxis, or angioedema   Pulmonary: Denies cough, wheeze left lower lateral chest pain   Cardiac: Denies exertional chest pain, palpitations, lightheadedness, dizziness, syncope, near syncope, AICD shock, bleeding. GI:  Denies N/V, abdominal pain,early satiety,  no bleeding or dark stool    Musculo: Arthritis, some neck pain at times   Neuro: + paraesthesia in feet-, Denies TIA or CVA sx, sz,    Skin:  Negative  Psy:   Feels a bit depressed after the holidays      CARDIAC EVALUATION   ECHO (2/13/17): EF 10% severe GHK, PSM. Dil LA. Mod MR. Mild to mod TR. Left pleural effusion  ECHO (1/19/17): EF 5-10% with severe GHK,. Mildly dil LA. Mild TR. PASP 46. ECHO: (5/15/17) EF 25%, severe GHK, basal-mid inferior AK, severe LAE, Mod MR, Mild TR,  ECHO 7/11/2018: LVD, EF 25%, severe GHK, AK  basal-mid inferior  Wall. G1DD, mild- mod LAE, mild MR/TR, RVSP 42 mmHg. AO root dilation 4.3       CATH (1/20/17): LM ost70. LAD m50. D1 80. LCx d70; OM1 99, OM2 90. RCA p100. No AVG. PAP 53/27/36. --- s/p PCI (2/9/17): pRCA 2.5x38 Xience + 2.75x12 Xience. ostLM 4.0x12 Xience post-dil with 4.5x12 NC.     ICD (6/12/17, Anup): [employer] single lead    CARDIAC MRI 1/31/17:LVEF 10%, LVH, RV dilated RVEF 25% GHK, marked LAE, mod CB, mild MR, mod-severe TR, The entire LAD territory : largely viable and should recover upon  revascularization. The entire RCA territory is largely viable and should recover  upon revascularization. The LCx territory demonstrate medium-size infarct with  limited viability. Large left-sided pleural effusion with passive atelectasis of the left lung. RICKY (3/20/17): R: 0.58, L: 0.56    LE arteriogm 6/2018: bilateral occluded SFA      EKG 5/3/18: SR,  ms, QTc 449 ms     History:  Past Medical History:   Diagnosis Date    Arthritis     hands/fingers    CAD (coronary artery disease)     involving native coronary artery of native heart without angina pectoris    Cardiomyopathy ([address] 01/20/2017    A. Echo (1/19/17):  EF 5-10% with severe GHK,. Mildly dil LA. Mild TR. PASP 46./systemic cardiomyopathy    Chronic kidney disease     Chronic obstructive pulmonary disease (HCC)     Chronic renal insufficiency 03/06/2017    Claudication ([address] 10/9/2018    Congestive heart failure ([address]     Diabetes mellitus ([address] 3/6/2017    IDDM    Dyslipidemia 3/6/2017    A. FLP (1/19/17): Tot 120, , HDL 19, LDL 76 (no Rx).  H/O: GI bleed 3/6/2017    Hepatitis C 3/6/2017    Hypokalemia     ICD (implantable cardioverter-defibrillator) in place     Ill-defined condition     flu 1/2018     Neuropathy 11/19/2018    Noncompliance with medication regimen 9/11/2018    Paroxysmal atrial fibrillation ([address] 3/6/2017    Peripheral vascular disease ([address] 9/18/2017    A. RICKY (3/20/17): Right 0.58, Left 0.56.  Tobacco dependence syndrome 10/9/2018    Vitamin D deficiency 6/4/2018     Past Surgical History:   Procedure Laterality Date    COLONOSCOPY N/A 2/17/2017    COLONOSCOPY performed by Ramonita Navarro.  Lisa Sheth MD at [address] COLONOSCOPY N/A 2/5/2018    COLONOSCOPY performed by Evangelist Zafar MD at [facility] ENDOSCOPY    HX CORONARY STENT PLACEMENT      LM, RCA x 2     HX GI      colonoscopy x 3 - last 2017 - polyp    HX IMPLANTABLE CARDIOVERTER DEFIBRILLATOR       Social History     Socioeconomic History    Marital status:      Spouse name: Not on file    Number of children: Not on file    Years of education: Not on file    Highest education level: Not on file   Social Needs    Financial resource strain: Not on file    Food insecurity - worry: Not on file    Food insecurity - inability: Not on file   ipvive needs - medical: Not on file   ipvive needs - non-medical: Not on file   Occupational History    Not on file   Tobacco Use    Smoking status: Current Some Day Smoker     Packs/day: 0.50     Years: 45.00     Pack years: 22.50     Last attempt to quit: 2017     Years since quittin.9    Smokeless tobacco: Never Used    Tobacco comment: using Nicotine patches   Substance and Sexual Activity    Alcohol use: No    Drug use: Yes     Types: Marijuana    Sexual activity: No     Partners: Female   Other Topics Concern    Not on file   Social History Narrative    Not on file     Family History   Problem Relation Age of Onset    Hypertension Mother     Heart Disease Mother         MURMUR    Cancer Father         COLON    Colon Cancer Father     Other Sister         born totally handicapped/epileptic    Kidney Disease Sister          from renal shutdown    Heart Disease Brother     Other Brother         ?pancreatic cancer or ?pancreatitis    Cancer Maternal Uncle         toes and spread    Cancer Paternal Uncle         stomach    Heart Attack Maternal Grandfather 47    Cancer Paternal Grandfather         bone    Cancer Maternal Uncle         stomach    Cancer Maternal Uncle         lung    Anesth Problems Neg Hx        Current Medications:   Current Outpatient Medications   Medication Sig Dispense Refill    insulin NPH/insulin regular (NOVOLIN 70/30 U-100 INSULIN) 100 unit/mL (70-30) injection INJECT 10 UNITS SUBCUTANEOUSLY IN THE MORNING AND 8 UNIT IN THE EVENING 5 Vial 5    metoprolol succinate (TOPROL-XL) 100 mg tablet Take 1 Tab by mouth daily for 360 days. 90 Tab 1    sacubitril-valsartan (ENTRESTO) 97 mg/103 mg tablet Take 1 Tab by mouth two (2) times a day. 180 Tab 3    aspirin 81 mg chewable tablet Take 81 mg by mouth daily.  insulin NPH/insulin regular (NOVOLIN 70/30) 100 unit/mL (70-30) injection 10 Units by SubCUTAneous route Daily (before breakfast).  insulin NPH/insulin regular (NOVOLIN 70/30) 100 unit/mL (70-30) injection 8 Units by SubCUTAneous route Daily (before dinner).  acetaminophen (TYLENOL EXTRA STRENGTH) 500 mg tablet Take 1,000 mg by mouth every six (6) hours as needed for Pain. Indications: BACK PAIN      Insulin Syringes, Disposable, 1 mL syrg Pt to take 10 units w/ breakfast and 8 units w/ dinner 500 Syringe 1    atorvastatin (LIPITOR) 40 mg tablet TAKE ONE TABLET BY MOUTH ONCE DAILY  Indications: hyperlipidemia 90 Tab 1    cholecalciferol (VITAMIN D3) 1,000 unit tablet Take 2 Tabs by mouth daily.  amiodarone (CORDARONE) 200 mg tablet Take 1 Tab by mouth daily. Indications: PREVENTION OF RECURRENT ATRIAL FIBRILLATION 30 Tab 6    bumetanide (BUMEX) 1 mg tablet Take 0.5 mg by mouth as needed (fluid retention). Allergies: Allergies   Allergen Reactions    Heparin (Porcine) Other (comments)     Was told when in the hospital that if he received heparin again that it would be deadly.        Vitals:    Visit Vitals  /72 (BP 1 Location: Left arm, BP Patient Position: Sitting)   Pulse 64   Temp 96.1 °F (35.6 °C)   Resp 20   Ht 5' 10\" (1.778 m)   Wt 202 lb 9.6 oz (91.9 kg)   SpO2 98%   BMI 29.07 kg/m²       Physical Exam:     Constitutional:  A&O x 3, well developed, WM in NAD  -older than his stated age   HENT[de-identified]   Normocephalic and atraumatic. mucous membranes pink, moist   Neck:   Neck supple. No lymphadenopathy R carotid bruit   Cardiovascular:  PMI nondisplaced, AICD R infraclavicular space, RRR, S1 & S2, + S3,  2/6 systolic murmur at apex,   JVP ~12 cm, +  HJR. Pulmonary/Chest:  Effort normal,  faint end insp wheeze. no rhonchi or rales  Abdominal:   Soft. Obese, , non-tender, + bowel sounds,    Extremities:   Distal calf vascular changes, no edema, no cyanosis, feet warm , + femoral pulses, + bilateral femoral bruits, difficult to palpate distal pedal pulses    Neurological:  A&O x 3 nofocal deficits    Skin:    Skin is warm and dry. Psychiatric:   He has a normal mood and affect.            Recent Labs:   Lab Results   Component Value Date/Time    WBC 7.1 12/06/2018 01:56 PM    HGB 14.5 12/06/2018 01:56 PM    HCT 43.9 12/06/2018 01:56 PM    PLATELET 237 26/92/8838 01:56 PM    MCV 82 12/06/2018 01:56 PM     Lab Results   Component Value Date/Time    TSH 1.050 12/06/2018 01:56 PM      Lab Results   Component Value Date/Time    BNP 1,209 (H) 01/21/2017 12:25 PM    NT pro-BNP 6,258 (H) 01/31/2017 04:52 AM    NT pro-BNP 6,094 (H) 01/30/2017 04:16 AM    NT pro-BNP 6,168 (H) 01/29/2017 04:18 AM    NT pro-BNP 6,876 (H) 01/28/2017 03:38 AM    NT pro-BNP 5,427 (H) 01/27/2017 03:56 AM    PROBNP 1,277 (H) 10/09/2018 12:00 AM    PROBNP 1,086 (H) 09/20/2018 12:00 AM    PROBNP 1,399 (H) 09/12/2018 02:30 PM    PROBNP 1,907 (H) 07/05/2018 12:00 AM    PROBNP 1,705 (H) 05/23/2018 12:00 AM      Lab Results   Component Value Date/Time    Sodium 143 12/06/2018 01:56 PM    Potassium 4.4 12/06/2018 01:56 PM    Chloride 106 12/06/2018 01:56 PM    CO2 24 12/06/2018 01:56 PM    Anion gap 6 03/08/2018 02:42 PM    Glucose 112 (H) 12/06/2018 01:56 PM    BUN 16 12/06/2018 01:56 PM    Creatinine 1.28 (H) 12/06/2018 01:56 PM    BUN/Creatinine ratio 13 12/06/2018 01:56 PM    GFR est AA 67 12/06/2018 01:56 PM    GFR est non-AA 58 (L) 12/06/2018 01:56 PM    Calcium 9.0 12/06/2018 01:56 PM    Bilirubin, total 0.4 12/06/2018 01:56 PM    ALT (SGPT) 6 12/06/2018 01:56 PM    AST (SGOT) 11 12/06/2018 01:56 PM    Alk. phosphatase 56 12/06/2018 01:56 PM    Protein, total 6.5 12/06/2018 01:56 PM    Albumin 4.2 12/06/2018 01:56 PM    Globulin 4.4 (H) 02/22/2017 03:16 AM    A-G Ratio 1.8 12/06/2018 01:56 PM      Lab Results   Component Value Date/Time    Hemoglobin A1c 7.0 (H) 12/06/2018 01:56 PM    Hemoglobin A1c (POC) 6.4 08/17/2017 11:00 AM          I have discussed the diagnosis with  Chaz Toussaint. and the intended plan as seen in the above orders. Questions were answered concerning future plans, and written instructions provided. I have discussed medication side effects and warnings with the patient as well. Thank you for allowing me to participate in the care of this delightful gentleman. Please do not hesitate to call with any questions. Opla De La Torre RN, ACNP-BC, [address] [address] [place] [facility]   24 hour VAD/HF Pager: [phone]

## 2019-01-09 ENCOUNTER — TELEPHONE (OUTPATIENT)
Dept: CARDIOLOGY CLINIC | Age: 69
End: 2019-01-09

## 2019-01-09 ENCOUNTER — TELEPHONE (OUTPATIENT)
Dept: CARDIAC REHAB | Age: 69
End: 2019-01-09

## 2019-01-09 LAB
BUN SERPL-MCNC: 20 MG/DL (ref 8–27)
BUN/CREAT SERPL: 17 (ref 10–24)
CALCIUM SERPL-MCNC: 9.2 MG/DL (ref 8.6–10.2)
CHLORIDE SERPL-SCNC: 104 MMOL/L (ref 96–106)
CO2 SERPL-SCNC: 19 MMOL/L (ref 20–29)
CREAT SERPL-MCNC: 1.19 MG/DL (ref 0.76–1.27)
GLUCOSE SERPL-MCNC: 142 MG/DL (ref 65–99)
POTASSIUM SERPL-SCNC: 4.4 MMOL/L (ref 3.5–5.2)
SODIUM SERPL-SCNC: 142 MMOL/L (ref 134–144)

## 2019-01-09 NOTE — TELEPHONE ENCOUNTER
----- Message from JENI Chou sent at 1/9/2019 11:36 AM EST -----  Labs stable  Continue Current plan  Thank you    I called patient and reviewed lab results, he states understanding. I also advised that I sent off referral for cardiac rehab. He states he will not do cardiac rehab.

## 2019-01-09 NOTE — TELEPHONE ENCOUNTER
1/9/2019 Cardiac Rehab: Called Dayanara Scottie Dineshrobbin. to discuss participation in the Cardiac Rehab Program. At this time . Raquel Cruz has declined to participate in Cardiac Rehab. I left our number with him in case he changes his mind.   Slime Adkins

## 2019-01-09 NOTE — TELEPHONE ENCOUNTER
New referral sent to vascular surgery. Recent office note faxed to # [phone]. Office needs to call patient to discuss payment arrangements since he is currently, in collection.

## 2019-01-29 PROBLEM — E56.9 VITAMIN DEFICIENCY: Status: ACTIVE | Noted: 2019-01-29

## 2019-02-13 ENCOUNTER — DOCUMENTATION ONLY (OUTPATIENT)
Dept: FAMILY MEDICINE CLINIC | Age: 69
End: 2019-02-13

## 2019-02-13 NOTE — PROGRESS NOTES
Faxed 12/06/18 office notes/lab results to Dr Tamara Moss per Gus request. Fax #[phone] confirmation received.

## 2019-03-04 ENCOUNTER — TELEPHONE (OUTPATIENT)
Dept: CARDIOLOGY CLINIC | Age: 69
End: 2019-03-04

## 2019-03-04 DIAGNOSIS — I25.5 ISCHEMIC CARDIOMYOPATHY: ICD-10-CM

## 2019-03-04 DIAGNOSIS — I50.20 SYSTOLIC HEART FAILURE, ACC/AHA STAGE C (HCC): Primary | ICD-10-CM

## 2019-03-04 DIAGNOSIS — R06.09 DOE (DYSPNEA ON EXERTION): ICD-10-CM

## 2019-03-04 NOTE — TELEPHONE ENCOUNTER
Patient called with report that he has a 3 pound weight gain since last visit, 2 pounds in last couple of days,  Associated with swelling \"head to feet\". He denies shortness of breath. He does have bumex to take as needed 0.5 mg.  I asked if he had taken it and he has taken 2 mg a day for past 3 days without any weight loss or symptom resolution. Please advise, thank you. I called patient, advised him per Rahul Ear:  Will draw labs, he would like to come to clinic, as he has a high balance at [facility].  I will draw labs for him tomorrow in [facility].

## 2019-03-21 NOTE — PROGRESS NOTES
[facility] Progress Note      NAME:  Fernanda Vargas. :   1950   MRN:   [id number]   PCP:  None  CARD:   Dr. Jeanie Irene    Date:  2017     Fernanda Vargas. is a 61 y.o. male with a who presented for further evaluation of severe CAD and systolic heart failure. Subjective:   He was initially seen at [facility] 3 years ago and told that he had heart failure with LVEF 30%. He was lost to follow up due to lack of insurance and has not been seen by a physician in the interim. He complains of progressive fatigue, NAVARRO, PND, and edema over the past year, prompting presentation to [facility]. He also complained of lower extremity bullae, weeping, and pain. He denied palpitations, presyncope, syncope, or chest pain. Past 24 hours:  Impella supported PCI to  of RCA and Left Main - hemodynamically stable  Impella in place at P6  Had 5 BMs yesterday - feels much better  SvO2 monitor wire exposed - SvO2 reading is not available    Objective:     Visit Vitals    BP 96/62 (BP 1 Location: Right arm, BP Patient Position: At rest)    Pulse 89    Temp 98 °F (36.7 °C)    Resp 19    Ht 5' 9\" (1.753 m)    Wt 78.8 kg (173 lb 11.6 oz)    SpO2 95%    BMI 25.65 kg/m2      Hemodynamics:  Cardiac Output 7.0 L/min  Cardiac Index 3.4 L/min/m2  PAP 52/27 34 mmHg  CVP 11 mmHg    General:  fatigued    HEENT: Normocephalic, EOMI, PERRLA, Hearing intact, trachea mid-line    Neck:  supple, no significant adenopathy, carotids upstroke normal bilaterally, no bruits    CVP:  11 cm      Heart:  Diminished PMI    Normal S1 and S2, S3 gallop    Impella at P6 - Flow 2.5 L    Murmur: 1/6 systolic murmur    Lungs: Diminished breath sounds left lower lung    Abdomen: soft, non-tender. Bowel sounds normal. +HSM    Extremity: Trace edema bilaterally    Neuro: Alert and oriented to person, place, and time; normal strength and tone. Normal symmetric reflexes.  Normal coordination and gait      1901 -  Date of Service: 03/19/2019    The patient is a 70-year-old woman with no previous history of skin cancer, but family history significant for father with skin cancer, here for skin cancer check.  Has not noticed any concerning lesions, no lesions have changed or become symptomatic.    On examination today, a fair complexioned, middle-aged woman with multiple grayish-brown to tannish-brown, stuck-on papules and plaques scattered on the forehead, bilateral temple and lateral cheeks area, especially on the left side.  No evidence of inflammation noticed.  There are similar lesions also noticed involving the mid sternum, inframammary fold, also scattered on the back and upper outer arm, a few lesions involving the thigh and calf also.  The rest of the examination, including the scalp, face, ears, neck, upper chest, abdomen, back, upper and lower extremities, including hands, feet, nails, hip, buttock and eyelids, show no other concerning lesions for malignancy.    DIAGNOSIS:  Multiple seborrheic keratoses.  Discussed the diagnosis with the patient.  Reassured as long as the lesions are stable without symptoms, certainly can be left alone.  Since the patient questioned about removal of various lesions on the face for cosmetic reasons, I advised the patient to consult Dr. Zambrano for the procedure.    In the meantime, certainly highly advised the patient regarding sun protection, self-examination given her fair complexion and family history of skin cancer.      Dictated By: Monserrat Gandhi MD  Signing Provider: MD TRACY Huitron/elyssa ([id number])  DD: 03/19/2019 15:29:14 TD: 03/20/2019 21:47:28    Copy Sent To:    0700  In: 4508.5 [P.O.:2160; I.V.:2348.5]  Out: 8925 [Urine:8925]  O2 Flow Rate (L/min): 3 l/min O2 Device: Nasal cannula  Temp (24hrs), Av.6 °F (37 °C), Min:98 °F (36.7 °C), Max:99.3 °F (37.4 °C)          Care Plan discussed with:    Comments   Patient x    Family      RN x    Care Manager                    Consultant:          Past History:     Past Medical History   Diagnosis Date    Cardiomyopathy ([address] 2017     A. Echo (17):  EF 5-10% with severe GHK,. Mildly dil LA. Mild TR. PASP 46. No past surgical history on file. Social History   Substance Use Topics    Smoking status: Not on file    Smokeless tobacco: Not on file    Alcohol use Not on file        No family history on file. Allergies: Allergies   Allergen Reactions    Heparin (Porcine) Unknown (comments)          Data Review:     CXR:   CXR Results  (Last 48 hours)               17 0423  XR CHEST PORT Final result    Impression:  IMPRESSION:        Slight improvement in left basilar fluid and atelectasis. Narrative:  EXAM:  XR CHEST PORT       INDICATION:  impella, CHF,       COMPARISON:  2/10/2017       FINDINGS:       A portable AP radiograph of the chest was obtained at 355 hours. The tip of the   leg PICC line remains in the region of the SVC. There is a catheter ascending   from the IVC the tip of which is in the region of the right hilar pulmonary   artery. There has been slight interval improvement in left basilar pleural   fluid and atelectasis. The right lung remains clear. The cardiomediastinal   silhouette is unchanged. 02/10/17 0429  XR CHEST PORT Final result    Impression:  IMPRESSION:    Unchanged life support lines and tubes including a an patella device. Slight   increase in left-sided pleural effusion may be accentuated by positional   differences.            Narrative:  INDICATION:    impella, CHF,       EXAMINATION:  AP CHEST, PORTABLE       COMPARISON: 2017 FINDINGS: Single AP portable view of the chest at 0353 hours demonstrates no   change in position of the lines and tubes. The cardiomediastinal silhouette is   stable. Slight increase in left pleural effusion though this could be due to or   accentuated by change in positioning of the patient. No evidence of   pneumothorax. 17 1827  XR CHEST PORT Final result    Impression:  IMPRESSION:    1. Cardiomegaly. 2. Left-sided airspace disease. 3. Impella device in expected position. 4. Femoral Purling-Val catheter with tip over the right pulmonary artery. Narrative:  EXAM:  XR CHEST PORT. INDICATION: Status post PCI within Impella support. COMPARISON: 2017. FINDINGS:    A portable AP radiograph of the chest was obtained at 1823 hours. Lines and tubes: The patient is on a cardiac monitor. There is a left arm PICC   line with tip over the superior vena cava which is unchanged. There is a new   intrapelvic device overlying the left ventricle and aortic outflow tract. There   is a Purling-Val catheter in the inferior vena cava with tip in the right   pulmonary artery. Lungs: There is left-sided airspace disease. Pleura: There is no pneumothorax or pleural effusion. Mediastinum: The cardiac silhouette is enlarged and the aorta is   atherosclerotic. Bones and soft tissues: The bones and soft tissues are grossly within normal   limits.                    Echocardiogram:   Echo Results  (Last 48 hours)               02/10/17 1349  2D ECHO COMPLETE ADULT (TTE) W OR WO CONTR Final result    Narrative:  [address]   [facility], [address] [place] [address] Ave   [phone]       Transthoracic Echocardiogram       Patient: Diaz Carrington   MRN: [id number]   ACCT #: [de-identified]   : 1950   Age: 61 years   Gender: Male   Height: 69 in   Weight: 189.6 lb   BSA: 2.02 m squared   BP: 112 / 78 mmHg   Study date: 10-Feb-2017   Status: Routine   Location: [facility] #: [id number]       Allergies: HEPARIN (PORCINE)       Ordering Physician:  Chanelle Hsu. Griffin Jacobs MD   Reading Group:  *[facility] Group   Technologist:  Leandra Cadena   Reading Physician:  Saturnino Moore MD       SUMMARY:   Left ventricle: What appears to be an Impella catheter is seen crossing   the aortic valve and ending in the LV cavity with continous flow by   Doppler out the LV outflow tract. The ventricle was moderately dilated. Systolic function was severely reduced. Ejection fraction was estimated to   be 10 %. There was severe diffuse hypokinesis. Left atrium: The atrium was moderately dilated. Mitral valve: There was moderate to severe regurgitation. Tricuspid valve: There was mild to moderate regurgitation with mildly   increased velocity suggesting RV systolic pressure about 15BWO above RA. INDICATIONS: Re eval impelia       PROCEDURE: This was a routine study. The study included complete 2D   imaging, complete spectral Doppler, and color Doppler. The heart rate was   81 bpm, at the start of the study. Systolic blood pressure was 112 mmHg,   at the start of the study. Diastolic blood pressure was 78 mmHg, at the   start of the study. LEFT VENTRICLE: What appears to be an Impella catheter is seen crossing   the aortic valve and ending in the LV cavity with continous flow by   Doppler out the LV outflow tract. The ventricle was moderately dilated. Systolic function was severely reduced. Ejection fraction was estimated to   be 10 %. There was severe diffuse hypokinesis. Wall thickness was normal.       RIGHT VENTRICLE: The size was normal. Systolic function was normal. Wall   thickness was normal. A catheter was present. LEFT ATRIUM: The atrium was moderately dilated. RIGHT ATRIUM: Size was normal. A catheter was present. MITRAL VALVE: There was mild thickening. DOPPLER: There was moderate to   severe regurgitation.        AORTIC VALVE: Not well visualized. TRICUSPID VALVE: Normal valve structure. DOPPLER: There was mild to   moderate regurgitation with mildly increased velocity suggesting RV   systolic pressure about 71STY above RA. PULMONIC VALVE: Not well visualized. AORTA: The root exhibited normal size. PERICARDIUM: There was no pericardial effusion. The pericardium was normal   in appearance. MEASUREMENT TABLES       M-mode measurements   Left atrium   (Reference normals)   AP dim   46 mm   (19-40)       SYSTEM MEASUREMENT TABLES       2D   Ao Diam: 3.94 cm   LA Diam: 4.6 cm   LAAs A2C: 38.06 cm2   LAAs A4C: 38.34 cm2   LAESV A-L A2C: 169.29 ml   LAESV A-L A4C: 173.45 ml   LAESV Index (A-L): 85.23 ml/m2   LAESV MOD A2C: 164.13 ml   LAESV MOD A4C: 158.79 ml   LAESV(A-L): 172.16 ml   LALs A2C: 7.26 cm   LALs A4C: 7.19 cm   %FS: 10.06 %   EDV(Teich): 212.15 ml   EF(Teich): 21.49 %   ESV(Teich): 166.56 ml   IVSd: 0.81 cm   LVIDd: 6.45 cm   LVIDs: 5.8 cm   LVPWd: 0.69 cm   SV(Teich): 45.59 ml   RA Diam: 6.09 cm   RVIDd: 4.12 cm       CW   AV Vmax: 0.86 m/s   AV maxP.96 mmHg   TR Vmax: 3.09 m/s   TR maxP.2 mmHg       PW   E' Lat: 0.09 m/s   E' Sept: 0.05 m/s   E/E' Lat: 11.9   E/E' Sept: 19.33   MV A Glenn: 0.72 m/s   MV Dec Meagher: 4.75 m/s2   MV DecT: 213.2 ms   MV E Glenn: 1.01 m/s   MV E/A Ratio: 1.41   MV PHT: 61.83 ms   MVA By PHT: 3.56 cm2       Prepared and E-signed by       Rogers Leahy MD   Signed 10-Feb-2017 16:24:30                 No results found for this visit on 17.       ECG:  EKG: ST with occasional PVC, NSIVCD, LAE    LABS:  Recent Results (from the past 24 hour(s))   GLUCOSE, POC    Collection Time: 02/10/17  1:31 PM   Result Value Ref Range    Glucose (POC) 141 (H) 65 - 100 mg/dL    Performed by MANNY Garza    Collection Time: 02/10/17  5:44 PM   Result Value Ref Range    Glucose (POC) 88 65 - 100 mg/dL    Performed by JOSE FRANCISCO COHEN    HGB & HCT    Collection Time: 02/10/17  6:33 PM   Result Value Ref Range    HGB 9.6 (L) 12.1 - 17.0 g/dL    HCT 31.0 (L) 36.6 - 50.3 %   LD    Collection Time: 02/10/17  6:33 PM   Result Value Ref Range     (H) 85 - 241 U/L   PTT    Collection Time: 02/10/17  6:33 PM   Result Value Ref Range    aPTT 46.2 (H) 22.1 - 32.5 sec    aPTT, therapeutic range     58.0 - 77.0 SECS   PTT    Collection Time: 02/10/17 10:05 PM   Result Value Ref Range    aPTT 66.4 (H) 22.1 - 32.5 sec    aPTT, therapeutic range     58.0 - 77.0 SECS   GLUCOSE, POC    Collection Time: 02/10/17 10:10 PM   Result Value Ref Range    Glucose (POC) 83 65 - 100 mg/dL    Performed by [address] [place] [address], POC    Collection Time: 02/10/17 11:14 PM   Result Value Ref Range    Glucose (POC) 116 (H) 65 - 100 mg/dL    Performed by Bill Grimes    HGB & HCT    Collection Time: 02/11/17  2:33 AM   Result Value Ref Range    HGB 9.3 (L) 12.1 - 17.0 g/dL    HCT 30.4 (L) 36.6 - 50.3 %   LD    Collection Time: 02/11/17  2:33 AM   Result Value Ref Range     (H) 85 - 241 U/L   PTT    Collection Time: 02/11/17  2:33 AM   Result Value Ref Range    aPTT 78.0 (H) 22.1 - 32.5 sec    aPTT, therapeutic range     58.0 - 92.3 SECS   METABOLIC PANEL, COMPREHENSIVE    Collection Time: 02/11/17  4:13 AM   Result Value Ref Range    Sodium 135 (L) 136 - 145 mmol/L    Potassium 3.5 3.5 - 5.1 mmol/L    Chloride 97 97 - 108 mmol/L    CO2 30 21 - 32 mmol/L    Anion gap 8 5 - 15 mmol/L    Glucose 186 (H) 65 - 100 mg/dL    BUN 35 (H) 6 - 20 MG/DL    Creatinine 1.62 (H) 0.70 - 1.30 MG/DL    BUN/Creatinine ratio 22 (H) 12 - 20      GFR est AA 52 (L) >60 ml/min/1.73m2    GFR est non-AA 43 (L) >60 ml/min/1.73m2    Calcium 8.5 8.5 - 10.1 MG/DL    Bilirubin, total 0.7 0.2 - 1.0 MG/DL    ALT (SGPT) 17 12 - 78 U/L    AST (SGOT) 22 15 - 37 U/L    Alk.  phosphatase 118 (H) 45 - 117 U/L    Protein, total 6.5 6.4 - 8.2 g/dL    Albumin 2.8 (L) 3.5 - 5.0 g/dL    Globulin 3.7 2.0 - 4.0 g/dL    A-G Ratio 0.8 (L) 1.1 - 2.2     MAGNESIUM    Collection Time: 02/11/17  4:13 AM   Result Value Ref Range    Magnesium 2.2 1.6 - 2.4 mg/dL   CBC W/O DIFF    Collection Time: 02/11/17  4:13 AM   Result Value Ref Range    WBC 7.6 4.1 - 11.1 K/uL    RBC 3.83 (L) 4.10 - 5.70 M/uL    HGB 9.2 (L) 12.1 - 17.0 g/dL    HCT 29.8 (L) 36.6 - 50.3 %    MCV 77.8 (L) 80.0 - 99.0 FL    MCH 24.0 (L) 26.0 - 34.0 PG    MCHC 30.9 30.0 - 36.5 g/dL    RDW 17.0 (H) 11.5 - 14.5 %    PLATELET 335 (L) 886 - 400 K/uL   PTT    Collection Time: 02/11/17  4:13 AM   Result Value Ref Range    aPTT 68.8 (H) 22.1 - 32.5 sec    aPTT, therapeutic range     58.0 - 77.0 SECS   POC VENOUS BLOOD GAS    Collection Time: 02/11/17  4:49 AM   Result Value Ref Range    Device: NASAL CANNULA      Flow rate (POC) 3 L/M    FIO2 (POC) 32 %    pH, venous (POC) 7.400 7.32 - 7.42      pCO2, venous (POC) 48.8 41 - 51 MMHG    pO2, venous (POC) 32 25 - 40 mmHg    HCO3, venous (POC) 30.3 (H) 23.0 - 28.0 MMOL/L    sO2, venous (POC) 60 (L) 65 - 88 %    Base excess, venous (POC) 5 mmol/L    Allens test (POC) N/A      Total resp.  rate 16      Site [facility]      Specimen type (POC) MIXED VENOUS     PTT    Collection Time: 02/11/17  8:14 AM   Result Value Ref Range    aPTT 71.7 (H) 22.1 - 32.5 sec    aPTT, therapeutic range     58.0 - 77.0 SECS   GLUCOSE, POC    Collection Time: 02/11/17  8:17 AM   Result Value Ref Range    Glucose (POC) 103 (H) 65 - 100 mg/dL    Performed by Tamera Casper    HGB & HCT    Collection Time: 02/11/17 11:50 AM   Result Value Ref Range    HGB 9.1 (L) 12.1 - 17.0 g/dL    HCT 29.3 (L) 36.6 - 50.3 %   LD    Collection Time: 02/11/17 11:50 AM   Result Value Ref Range     (H) 85 - 241 U/L   PTT    Collection Time: 02/11/17 11:53 AM   Result Value Ref Range    aPTT 72.3 (H) 22.1 - 32.5 sec    aPTT, therapeutic range     58.0 - 77.0 SECS   GLUCOSE, POC    Collection Time: 02/11/17 11:55 AM   Result Value Ref Range    Glucose (POC) 159 (H) 65 - 100 mg/dL    Performed by HIA Chalice Risk      All Micro Results     Procedure Component Value Units Date/Time    CULTURE, BLOOD, PAIRED [[id number]] Collected:  02/05/17 0322    Order Status:  Completed Specimen:  Blood Updated:  02/10/17 0542     Special Requests: NO SPECIAL REQUESTS        Culture result: NO GROWTH 5 DAYS       CULTURE, BODY FLUID Sherif Lombardo [[id number]] Collected:  01/26/17 1505    Order Status:  Completed Specimen:  Thoracentesis Updated:  01/30/17 1057     Special Requests: NO SPECIAL REQUESTS        GRAM STAIN OCCASIONAL  WBCS SEEN         NO ORGANISMS SEEN        Culture result: NO GROWTH 4 DAYS       CULTURE, Reynaldo Basurto STAIN [[id number]] Collected:  01/25/17 2134    Order Status:  Completed Specimen:  Leg Updated:  01/27/17 1440     Special Requests: NO SPECIAL REQUESTS        GRAM STAIN RARE  WBCS SEEN         NO ORGANISMS SEEN        Culture result: LIGHT  STREPTOCOCCI, BETA HEMOLYTIC GROUP C  . .. Penicillin and ampicillin are drugs of choice for treatment of beta-hemolytic streptococcal infections. Susceptibility testing of penicillins and beta-lactams approved by the FDA for treatment of beta-hemolytic streptococcal infections need not be performed routinely, because nonsusceptible isolates are extremely rare. CLSI 2012         MODERATE  BETTIE TROPICALIS           Abdominal Ultrasound 2/4/17  IMPRESSION: Distended gallbladder with gallbladder wall thickening,  pericholecystic fluid and tenderness to probe palpation during exam. Findings  are consistent with acalculus cholecystitis. Thoracentesis 1/26/17  Specimen Source   1: Pleural Fluid   CYTOLOGIC INTERPRETATION:   Scattered mesothelial cells with degenerative changes and mixed inflammatory cells   General Categorization   No cells diagnostic for malignancy   Specimen Adequacy   Satisfactory for evaluation       Cardiac MRI 1/31/17  IMPRESSION  1. Markedly dilated left ventricle with 3-D end-diastolic volume index to body  surface area of 153 mL/sq m.  Mild eccentric left ventricular hypertrophy with  3-D mass index to body surface area of 85 g/sq m. Severe left ventricular  systolic dysfunction. Severe global hypokinesis with regional variation. 3-D  LVEF 10%. 2. Moderately dilated right ventricle by 3-D volumetric assessment. RV end  diastolic volume indexed to body surface area of 138 mL/sq m. Moderate to severe  right ventricular systolic dysfunction. Global hypokinesis. 3-D RVEF 25%. 3. Apical a tethered mitral valve leaflets. Mild mitral regurgitation. 4. Moderately severe 3+ tricuspid regurgitation. 5. On LGE study, the anterior wall, anteroseptal wall, anteroapical wall, and  anterior lateral wall are largely viable without significant myocardial  infarction. The entire inferior wall and inferoseptal wall are completely viable  without any infarct. The base to mid inferolateral wall demonstrate a near  transmural greater than 75% thickness infarct with minimal or no viable  myocardium in this territory. The distal inferolateral wall, apical lateral wall  does not demonstrate any infarct and are largely viable. Based on the viability  imaging, the entire LAD territory is largely viable and should recover upon  revascularization. The entire RCA territory is largely viable and should recover  upon revascularization. The LCx territory demonstrate medium-size infarct with  limited viability. 6. Large left-sided pleural effusion with passive atelectasis of the left lung. 7. Dilated branch pulmonary arteries suggest pulmonary hypertension. 8. Markedly dilated left atrium measuring 59 x 55 mm. Moderately dilated right  atrium measuring 46 x 56 mm.     Medications reviewed:    Current Facility-Administered Medications   Medication Dose Route Frequency    potassium chloride 20 mEq in 50 ml IVPB  20 mEq IntraVENous Q1H    bisacodyl (DULCOLAX) suppository 10 mg  10 mg Rectal DAILY PRN    magnesium hydroxide (MILK OF MAGNESIA) 400 mg/5 mL oral suspension 30 mL  30 mL Oral DAILY PRN    morphine injection 1-2 mg  1-2 mg IntraVENous Q3H PRN    sodium chloride (NS) flush 5-10 mL  5-10 mL IntraVENous Q8H    sodium chloride (NS) flush 5-10 mL  5-10 mL IntraVENous PRN    hydrocortisone Sod Succ (PF) (SOLU-CORTEF) injection 100 mg  100 mg IntraVENous ONCE PRN    nitroglycerin (NITROSTAT) tablet 0.4 mg  0.4 mg SubLINGual Q5MIN PRN    clopidogrel (PLAVIX) tablet 75 mg  75 mg Oral DAILY    bivalirudin (ANGIOMAX) 250 mg in 0.9% sodium chloride (MBP/ADV) 50 mL  0.2-2.5 mg/kg/hr IntraVENous CONTINUOUS    dextrose 5% infusion  14 mL/hr IntraVENous CONTINUOUS    bumetanide (BUMEX) tablet 2 mg  2 mg Oral TID    docusate sodium (COLACE) capsule 100 mg  100 mg Oral DAILY PRN    piperacillin-tazobactam (ZOSYN) 3.375 g in 0.9% sodium chloride (MBP/ADV) 100 mL  3.375 g IntraVENous Q8H    nicotine (NICODERM CQ) 21 mg/24 hr patch 1 Patch  1 Patch TransDERmal DAILY    glimepiride (AMARYL) tablet 4 mg  4 mg Oral ACB    insulin glargine (LANTUS) injection 10 Units  10 Units SubCUTAneous DAILY    gabapentin (NEURONTIN) capsule 100 mg  100 mg Oral BID    milrinone (PRIMACOR) 20 MG/100 ML D5W infusion  0.375 mcg/kg/min IntraVENous CONTINUOUS    carvedilol (COREG) tablet 3.125 mg  3.125 mg Oral BID WITH MEALS    amiodarone (CORDARONE) tablet 200 mg  200 mg Oral Q12H    bacitracin 500 unit/gram packet 1 Packet  1 Packet Topical PRN    atorvastatin (LIPITOR) tablet 10 mg  10 mg Oral DAILY    0.9% sodium chloride infusion  10 mL/hr IntraVENous CONTINUOUS    acetaminophen (TYLENOL) tablet 650 mg  650 mg Oral Q6H PRN    aspirin delayed-release tablet 81 mg  81 mg Oral DAILY    glucagon (GLUCAGEN) injection 1 mg  1 mg IntraMUSCular PRN    glucose chewable tablet 16 g  4 Tab Oral PRN    insulin lispro (HUMALOG) injection   SubCUTAneous AC&HS    magnesium oxide (MAG-OX) tablet 400 mg  400 mg Oral DAILY    0.9% sodium chloride infusion  3 mL/hr IntraVENous CONTINUOUS    sodium chloride (NS) flush 5-10 mL  5-10 mL IntraVENous Q8H    sodium chloride (NS) flush 5-10 mL  5-10 mL IntraVENous PRN    albuterol (PROVENTIL VENTOLIN) nebulizer solution 2.5 mg  2.5 mg Nebulization Q4H PRN    diphenhydrAMINE (BENADRYL) capsule 25 mg  25 mg Oral QHS PRN    oxyCODONE-acetaminophen (PERCOCET) 5-325 mg per tablet 2 Tab  2 Tab Oral Q4H PRN    ELECTROLYTE REPLACEMENT PROTOCOL  1 Each Other PRN     HIDA Scan 2/7/17  Gallbladder emptying study was performed per protocol utilizing 5. 2mCi Tc-99 M  Choletec and 1.86 mcg CCK intravenously. There is normal tracer distribution  throughout the liver. Activity is noted in the gallbladder, common bile duct,  and small bowel. The gallbladder ejection fraction is 31% (normal greater than  or equal to 35%).    IMPRESSION: Abnormal gallbladder emptying study with an EF of only 31%.        IMPRESSION:   1. Acute on chronic systolic heart failure (ICM) - Stage D, NYHA Class IV  2. Severe native coronary artery disease - s/p PCI to RCA and Left Main with Impella support  3. Diabetes Mellitus, Hga1c 13.5  4. History of tobacco abuse  5. Cellulitis  6. CHACORTA on CKD3  7. Pulmonary HTN  8. Persistent atrial fibrillation  9. Hyponatremia  10. Left pleural effusion  11. Acalculous Cholecystitis       PLAN:   1. Increasee  IV milrinone 0.375 mcg/kg/min, Follow I/O, Replete electrolytes, Hold ACE-I due to CHACORTA on CKD. Continue low dose coreg and po bumex 2 mg tid, start spironolactone 25 mg daily  2. Continue ASA, clopidogrel, bivalrudin, statin, low dose BB  3. Continue Impella support at P6 - plan to remove on Monday with groin cutdown  4. Lantus and sliding scale insulin, accuchecks, diabetic education  5. IV cefazolin 1 gram q 8 hours for cellulitis and IV zosyn 3.375 g q 8 hours for choleycystitis  6.  Smoking cessation counseling, nicotine patch         Critical care was necessary to treat or prevent imminent or life threatening deterioration of the following conditions: cardiac failure, respiratory failure and CNS failure or compromise    Total Critical Care time spent:  33 minutes. There was no overlap with other services    Services Provided:  1. Telemetry review and 12 lead ECG interpretation  2. Hemodynamic interpretation, assessment, and management  3. Review and interpretation of CXR  4. Review and interpretation of lab values  5. Review and interpretation of microbiologic data and culture results  6. Review of medications and administration  7. Review and interpretation of nutrition requirements and management  8. Discussion of management withother consultants and services  9. Clinical update to family members    [place] [facility].  Chelle Wen MD, [place] [address] Director    [address] [facility]  [facility], [address] [place] [address], [address] [place] [address]  Office [phone]  Fax [phone]  24 hour VAD/HF Pager: [phone]

## 2019-03-29 ENCOUNTER — TELEPHONE (OUTPATIENT)
Dept: CARDIOLOGY CLINIC | Age: 69
End: 2019-03-29

## 2019-03-30 RX ORDER — TORSEMIDE 20 MG/1
20 TABLET ORAL DAILY
Qty: 60 TAB | Refills: 3 | Status: SHIPPED | OUTPATIENT
Start: 2019-03-30 | End: 2019-04-02 | Stop reason: SDUPTHER

## 2019-03-30 NOTE — TELEPHONE ENCOUNTER
Spoke with patient. He took an \"old\" bumex 0.5 mg without any results. Prescribed torsemide 20 mg daily. Instructed patient that he could take 2 tablets of torsemide 20 mg daily if he did not have a response from 1 tablet. We will see him in the clinic next week. Frances Mary will contact him Monday to schedule a good time.

## 2019-04-01 ENCOUNTER — TELEPHONE (OUTPATIENT)
Dept: CARDIOLOGY CLINIC | Age: 69
End: 2019-04-01

## 2019-04-02 ENCOUNTER — OFFICE VISIT (OUTPATIENT)
Dept: CARDIOLOGY CLINIC | Age: 69
End: 2019-04-02

## 2019-04-02 VITALS
OXYGEN SATURATION: 98 % | HEART RATE: 88 BPM | BODY MASS INDEX: 29.71 KG/M2 | DIASTOLIC BLOOD PRESSURE: 74 MMHG | SYSTOLIC BLOOD PRESSURE: 154 MMHG | RESPIRATION RATE: 20 BRPM | TEMPERATURE: 98.7 F | WEIGHT: 207.5 LBS | HEIGHT: 70 IN

## 2019-04-02 DIAGNOSIS — I48.0 PAROXYSMAL ATRIAL FIBRILLATION (HCC): ICD-10-CM

## 2019-04-02 DIAGNOSIS — I50.22 SYSTOLIC CHF, CHRONIC (HCC): ICD-10-CM

## 2019-04-02 DIAGNOSIS — I25.5 ISCHEMIC CARDIOMYOPATHY: Primary | ICD-10-CM

## 2019-04-02 DIAGNOSIS — E78.5 DYSLIPIDEMIA: ICD-10-CM

## 2019-04-02 RX ORDER — METOPROLOL SUCCINATE 100 MG/1
50 TABLET, EXTENDED RELEASE ORAL DAILY
Qty: 90 TAB | Refills: 1 | Status: SHIPPED | OUTPATIENT
Start: 2019-04-02 | End: 2019-01-01 | Stop reason: DRUGHIGH

## 2019-04-02 RX ORDER — CLOPIDOGREL BISULFATE 75 MG/1
75 TABLET ORAL DAILY
Qty: 90 TAB | Refills: 3 | Status: SHIPPED | OUTPATIENT
Start: 2019-04-02 | End: 2019-01-01

## 2019-04-02 RX ORDER — ATORVASTATIN CALCIUM 40 MG/1
TABLET, FILM COATED ORAL
Qty: 90 TAB | Refills: 3 | Status: SHIPPED | OUTPATIENT
Start: 2019-04-02 | End: 2020-01-01

## 2019-04-02 RX ORDER — TORSEMIDE 20 MG/1
40 TABLET ORAL DAILY
Qty: 120 TAB | Refills: 3 | Status: SHIPPED | OUTPATIENT
Start: 2019-04-02 | End: 2019-04-29

## 2019-04-02 NOTE — PATIENT INSTRUCTIONS
1. Increase torsemide to 40 mg daily 2. Reduce Toprol XL to 50 mg daily 3. REsume lipitor and plavix 4. Blood work today 5. Call the [facility] tomorrow to confirm the response of the increased torsemide dose 6. Follow up in the [facility] in 1 week

## 2019-04-02 NOTE — LETTER
4/2/2019 2:52 PM 
 
Patient:  Hina Rushing. YOB: 1950 Date of Visit: 4/2/2019 Dear Roseanne Escoto MD 
[address] [place] [address] [id number] [address] VIA In Basket 
 : Thank you for referring Mr. Jaspreet Davis to me for evaluation/treatment. Below are the relevant portions of my assessment and plan of care. If you have questions, please do not hesitate to call me. I look forward to following Mr. Kelli Sousa along with you. Sincerely, Ruby Riley MD

## 2019-04-02 NOTE — PROGRESS NOTES
[place]  Note DOS:  4/2/2019 PRIMARY CARE PHYSICIAN: Niels Waldrop MD 
CARDIOLOGIST: Dr. Negar Del Angel  
EP: Richie Schwarz MD  
 
 
Impression/Plan: 
 
Chronic systolic heart failure (EF 25%), d/t ICM, ACC/AHA , NYHA class IV Recommend admission - patient is not prepared today Continue the Entresto 97/103 mg BID Reduce  Toprol XL to 50 mg daily Hold on aldactone for now hx hyperkalemia while on GDMT, and he does not want to take valtessa Increase torsemide to 40 mg daily Labs today CMP, NT-proBNP, CBC Continue daily weights, sodium and fluid restriction Follow  Up in clinic in 1 week HTN - Stage 2 Continue meds Low sodium diet Increase torsemide to 40 mg daily PAD- charles showed significant PAD,not amenable to  PTCA and the recommendations are for vascular surgery. He would like to see a vascular surgeon at [facility], [place] Refer to  Dr. Nancy Lee or Dr. Rajwinder Galarza as pt  would like a surgeon at [facility] [place] Encouraged to exercise and referred to  cardiac rehab Smoking cessation Encouraged to resume DAPT, statin -once medicare part D kicks in 
 
CAD s/p PCI Appointment with Dr. Gabriela Rivas in March Continue ASA, BB Off plavix and statin - resumed PAF On amiodarone 200 mg daily Reduce Toprol XL to 50 mg daily Dr Shaniqua Ray to follow No anticoag 2/2 GI bleed/AVMs Recurrent tobacco use Counseled on smoking cessation Nicoderm patch Incomplete LBBB  msec Not eligible for CRT 
 
CKD3 Check CMP Suspect KELLY and COPD Encouraged him to see pulmonary for eval of COPD and sleep study- he has declined Proventil  Inhaler prn -  
 
Diabetes recent A1c 7.1 Insulin management by PCP Pt has declined [place] XOL- per PCP/cardiology H/O lower GIB/s/p colorectal surgery negative genetics Paraesthesias/neuropathy 2/2 DM, PAD  
 Vitamin B levels OK Med Non-compliance Pt asst from [employer] Encouraged to resume plavix and lipitor Chief Complaint Patient presents with  Follow-up  Leg Swelling  Shortness of Breath  Bloated  Cough  
  productive of \"clear, frothy\" sputum HPI: Beba Betters. is a 74y.o. year old  male with a history of chronic HFrEF (EF 25%) secondary to ICM- CAD s/p PCI of LM,  of RCA s/p AICD complicated by  PAF (amiodarone) , Hep C, GI bleed (s/p colorectal surgery), and remote tobacco abuse. He presents to clinic today for follow up of his heart failure. He presents to clinic today for progressive weight gain and swelling in his lower extremities since January. He was started on torsemide 20 mg daily over the weekend with little response. He also notes NAVARRO and fatigue. He does not add salt but admits to eating out 3-4 times a week. Fernanda Murillo. ([facility]) lives with his wife. He recently welcomed his 7th great grandchild. He is retired from industrial pipe welding. He smoked 50 years- quit 1/2017, and has resumed. occasional etoh. He enjoys old movies, visiting with is 2 adult children and grand children. He enjoys metal [employer] for [employer] relics, and spends his days helping his children. Review of Systems: :   
General:  Positive for fatigue, Denies fever, chills, negative HEENT: Denies epistaxis, or angioedema Pulmonary: Denies cough, wheeze left lower lateral chest pain  
Cardiac: Denies exertional chest pain, palpitations, lightheadedness, dizziness, syncope, near syncope, AICD shock, bleeding. GI:  Denies N/V, abdominal pain,early satiety,  no bleeding or dark stool Musculo: Arthritis, some neck pain at times Neuro: + paraesthesia in feet-, Denies TIA or CVA sx, sz, Skin:  Negative Psy:   Feels a bit depressed after the holidays CARDIAC EVALUATION  
 ECHO (2/13/17): EF 10% severe GHK, PSM. Dil LA. Mod MR. Mild to mod TR. Left pleural effusion ECHO (1/19/17): EF 5-10% with severe GHK,. Mildly dil LA. Mild TR. PASP 46. ECHO: (5/15/17) EF 25%, severe GHK, basal-mid inferior AK, severe LAE, Mod MR, Mild TR, ECHO 7/11/2018: LVD, EF 25%, severe GHK, AK  basal-mid inferior Wall. G1DD, mild- mod LAE, mild MR/TR, RVSP 42 mmHg. AO root dilation 4.3 CATH (1/20/17): LM ost70. LAD m50. D1 80. LCx d70; OM1 99, OM2 90. RCA p100. No AVG. PAP 53/27/36. --- s/p PCI (2/9/17): pRCA 2.5x38 Xience + 2.75x12 Xience. ostLM 4.0x12 Xience post-dil with 4.5x12 NC. ICD (6/12/17, Anup): [employer] single lead CARDIAC MRI 1/31/17:LVEF 10%, LVH, RV dilated RVEF 25% GHK, marked LAE, mod CB, mild MR, mod-severe TR, The entire LAD territory : largely viable and should recover upon 
revascularization. The entire RCA territory is largely viable and should recover 
upon revascularization. The LCx territory demonstrate medium-size infarct with 
limited viability. Large left-sided pleural effusion with passive atelectasis of the left lung. RICKY (3/20/17): R: 0.58, L: 0.56 LE arteriogm 6/2018: bilateral occluded SFA 
 
EKG 5/3/18: SR,  ms, QTc 449 ms History: 
Past Medical History:  
Diagnosis Date  Arthritis   
 hands/fingers  CAD (coronary artery disease)   
 involving native coronary artery of native heart without angina pectoris  Cardiomyopathy ([facility] 01/20/2017 A. Echo (1/19/17):  EF 5-10% with severe GHK,. Mildly dil LA. Mild TR. PASP 46./systemic cardiomyopathy  Chronic kidney disease  Chronic obstructive pulmonary disease ([facility]  Chronic renal insufficiency 03/06/2017  Chronic systolic congestive heart failure, NYHA class 3 ([facility] 1/8/2019  Claudication ([facility] 10/9/2018  Congestive heart failure ([facility]  Diabetes mellitus ([facility] 3/6/2017 IDDM  Dyslipidemia 3/6/2017 A.  FLP (17): Tot 120, , HDL 19, LDL 76 (no Rx).  H/O: GI bleed 3/6/2017  Hepatitis C 3/6/2017  Hypokalemia  ICD (implantable cardioverter-defibrillator) in place  Ill-defined condition   
 flu 2018  Neuropathy 2018  Noncompliance with medication regimen 2018  Paroxysmal atrial fibrillation ([facility] 3/6/2017  Peripheral vascular disease ([facility] 2017 A. RICKY (3/20/17): Right 0.58, Left 0.56.  Tobacco dependence syndrome 10/9/2018  Vitamin D deficiency 2018 Past Surgical History:  
Procedure Laterality Date  COLONOSCOPY N/A 2017 COLONOSCOPY performed by Jimena Borjas. Walton Boxer, MD at [facility] ENDOSCOPY  COLONOSCOPY N/A 2018 COLONOSCOPY performed by Jimena Borjas. Walton Boxer, MD at [facility] ENDOSCOPY  
 HX CORONARY STENT PLACEMENT    
 LM, RCA x 2   
 HX GI    
 colonoscopy x 3 - last 2017 - polyp  HX IMPLANTABLE CARDIOVERTER DEFIBRILLATOR Social History Socioeconomic History  Marital status:  Spouse name: Not on file  Number of children: Not on file  Years of education: Not on file  Highest education level: Not on file Occupational History  Not on file Social Needs  Financial resource strain: Not on file  Food insecurity:  
  Worry: Not on file Inability: Not on file  Transportation needs:  
  Medical: Not on file Non-medical: Not on file Tobacco Use  Smoking status: Current Some Day Smoker Packs/day: 0.50 Years: 45.00 Pack years: 22.50 Last attempt to quit: 2017 Years since quittin.1  Smokeless tobacco: Never Used  Tobacco comment: using Nicotine patches Substance and Sexual Activity  Alcohol use: No  
 Drug use: Yes Types: Marijuana  Sexual activity: Never Partners: Female Lifestyle  Physical activity:  
  Days per week: Not on file Minutes per session: Not on file  Stress: Not on file Relationships  Social connections:  
  Talks on phone: Not on file Gets together: Not on file Attends Jew service: Not on file Active member of club or organization: Not on file Attends meetings of clubs or organizations: Not on file Relationship status: Not on file  Intimate partner violence:  
  Fear of current or ex partner: Not on file Emotionally abused: Not on file Physically abused: Not on file Forced sexual activity: Not on file Other Topics Concern  Not on file Social History Narrative  Not on file Family History Problem Relation Age of Onset  Hypertension Mother  Heart Disease Mother MURMUR  
 Cancer Father COLON  
 Colon Cancer Father  Other Sister   
     born totally handicapped/epileptic  Kidney Disease Sister   
      from renal shutdown  Heart Disease Brother  Other Brother ?pancreatic cancer or ?pancreatitis  Cancer Maternal Uncle   
     toes and spread  Cancer Paternal Uncle   
     stomach  
 Heart Attack Maternal Grandfather 47  Cancer Paternal Grandfather   
     bone  Cancer Maternal Uncle   
     stomach  Cancer Maternal Uncle   
     lung  Anesth Problems Neg Hx Current Medications:  
Current Outpatient Medications Medication Sig Dispense Refill  torsemide (DEMADEX) 20 mg tablet Take 2 Tabs by mouth daily. 120 Tab 3  
 metoprolol succinate (TOPROL-XL) 100 mg tablet Take 0.5 Tabs by mouth daily for 360 days. Indications: chronic heart failure 90 Tab 1  
 insulin NPH/insulin regular (NOVOLIN 70/30 U-100 INSULIN) 100 unit/mL (70-30) injection INJECT 10 UNITS SUBCUTANEOUSLY IN THE MORNING AND 8 UNIT IN THE EVENING 5 Vial 5  
 sacubitril-valsartan (ENTRESTO) 97 mg/103 mg tablet Take 1 Tab by mouth two (2) times a day. 180 Tab 3  
 aspirin 81 mg chewable tablet Take 81 mg by mouth daily.     
 Insulin Syringes, Disposable, 1 mL syrg Pt to take 10 units w/ breakfast and 8 units w/ dinner 500 Syringe 1  
 atorvastatin (LIPITOR) 40 mg tablet TAKE ONE TABLET BY MOUTH ONCE DAILY  Indications: hyperlipidemia 90 Tab 1  cholecalciferol (VITAMIN D3) 1,000 unit tablet Take 2 Tabs by mouth daily.  amiodarone (CORDARONE) 200 mg tablet Take 1 Tab by mouth daily. Indications: PREVENTION OF RECURRENT ATRIAL FIBRILLATION 30 Tab 6  
 insulin NPH/insulin regular (NOVOLIN 70/30) 100 unit/mL (70-30) injection 10 Units by SubCUTAneous route Daily (before breakfast).  insulin NPH/insulin regular (NOVOLIN 70/30) 100 unit/mL (70-30) injection 8 Units by SubCUTAneous route Daily (before dinner).  acetaminophen (TYLENOL EXTRA STRENGTH) 500 mg tablet Take 1,000 mg by mouth every six (6) hours as needed for Pain. Indications: BACK PAIN Allergies: Allergies Allergen Reactions  Heparin (Porcine) Other (comments) Was told when in the hospital that if he received heparin again that it would be deadly. Vitals:   
Visit Vitals /74 (BP 1 Location: Left arm, BP Patient Position: Sitting) Pulse 88 Temp 98.7 °F (37.1 °C) (Oral) Resp 20 Ht 5' 10\" (1.778 m) Wt 207 lb 8 oz (94.1 kg) SpO2 98% BMI 29.77 kg/m² Physical Exam:  
 
Constitutional:  A&O x 3, well developed, WM in NAD  -older than his stated age HENT[de-identified]   Normocephalic and atraumatic. mucous membranes pink, moist  
Neck:   Neck supple. No lymphadenopathy R carotid bruit Cardiovascular:  PMI nondisplaced, AICD R infraclavicular space, RRR, S1 & S2, + S3,  2/6 systolic murmur at apex,   JVP ~12 cm, +  HJR. Pulmonary/Chest:  Effort normal,  faint end insp wheeze. no rhonchi or rales Abdominal:   Soft. Obese, , non-tender, + bowel sounds, Extremities:   Distal calf vascular changes, no edema, no cyanosis, feet warm , + femoral pulses, + bilateral femoral bruits, difficult to palpate distal pedal pulses Neurological:  A&O x 3 nofocal deficits Skin:    Skin is warm and dry. Psychiatric:   He has a normal mood and affect. Recent Labs:  
Lab Results Component Value Date/Time WBC 7.1 12/06/2018 01:56 PM  
 HGB 14.5 12/06/2018 01:56 PM  
 HCT 43.9 12/06/2018 01:56 PM  
 PLATELET 204 42/12/8614 01:56 PM  
 MCV 82 12/06/2018 01:56 PM  
 
Lab Results Component Value Date/Time TSH 1.050 12/06/2018 01:56 PM  
  
Lab Results Component Value Date/Time BNP 1,209 (H) 01/21/2017 12:25 PM  
 NT pro-BNP 6,258 (H) 01/31/2017 04:52 AM  
 NT pro-BNP 6,094 (H) 01/30/2017 04:16 AM  
 NT pro-BNP 6,168 (H) 01/29/2017 04:18 AM  
 NT pro-BNP 6,876 (H) 01/28/2017 03:38 AM  
 NT pro-BNP 5,427 (H) 01/27/2017 03:56 AM  
 PROBNP 1,277 (H) 10/09/2018 12:00 AM  
 PROBNP 1,086 (H) 09/20/2018 12:00 AM  
 PROBNP 1,399 (H) 09/12/2018 02:30 PM  
 PROBNP 1,907 (H) 07/05/2018 12:00 AM  
 PROBNP 1,705 (H) 05/23/2018 12:00 AM  
  
Lab Results Component Value Date/Time Sodium 142 01/08/2019 12:00 AM  
 Potassium 4.4 01/08/2019 12:00 AM  
 Chloride 104 01/08/2019 12:00 AM  
 CO2 19 (L) 01/08/2019 12:00 AM  
 Anion gap 6 03/08/2018 02:42 PM  
 Glucose 142 (H) 01/08/2019 12:00 AM  
 BUN 20 01/08/2019 12:00 AM  
 Creatinine 1.19 01/08/2019 12:00 AM  
 BUN/Creatinine ratio 17 01/08/2019 12:00 AM  
 GFR est AA 73 01/08/2019 12:00 AM  
 GFR est non-AA 63 01/08/2019 12:00 AM  
 Calcium 9.2 01/08/2019 12:00 AM  
 Bilirubin, total 0.4 12/06/2018 01:56 PM  
 ALT (SGPT) 6 12/06/2018 01:56 PM  
 AST (SGOT) 11 12/06/2018 01:56 PM  
 Alk. phosphatase 56 12/06/2018 01:56 PM  
 Protein, total 6.5 12/06/2018 01:56 PM  
 Albumin 4.2 12/06/2018 01:56 PM  
 Globulin 4.4 (H) 02/22/2017 03:16 AM  
 A-G Ratio 1.8 12/06/2018 01:56 PM  
  
Lab Results Component Value Date/Time Hemoglobin A1c 7.0 (H) 12/06/2018 01:56 PM  
 Hemoglobin A1c (POC) 6.4 08/17/2017 11:00 AM  
  
Thank you for letting us see him with you, Dorie Umanzor MD, Alisia Espinoza Chief of Cardiology, [facility] Medical Director Jennifer Roman [address], [address] Office [phone] Fax [phone]

## 2019-04-03 ENCOUNTER — TELEPHONE (OUTPATIENT)
Dept: CARDIOLOGY CLINIC | Age: 69
End: 2019-04-03

## 2019-04-03 DIAGNOSIS — R06.02 SHORTNESS OF BREATH: ICD-10-CM

## 2019-04-03 DIAGNOSIS — I50.20 SYSTOLIC HEART FAILURE, ACC/AHA STAGE C (HCC): Primary | ICD-10-CM

## 2019-04-03 LAB
ALBUMIN SERPL-MCNC: 4.1 G/DL (ref 3.6–4.8)
ALBUMIN/GLOB SERPL: 1.7 {RATIO} (ref 1.2–2.2)
ALP SERPL-CCNC: 60 IU/L (ref 39–117)
ALT SERPL-CCNC: 20 IU/L (ref 0–44)
AST SERPL-CCNC: 16 IU/L (ref 0–40)
BASOPHILS # BLD AUTO: 0 X10E3/UL (ref 0–0.2)
BASOPHILS NFR BLD AUTO: 1 %
BILIRUB SERPL-MCNC: 0.6 MG/DL (ref 0–1.2)
BUN SERPL-MCNC: 25 MG/DL (ref 8–27)
BUN/CREAT SERPL: 18 (ref 10–24)
CALCIUM SERPL-MCNC: 8.7 MG/DL (ref 8.6–10.2)
CHLORIDE SERPL-SCNC: 101 MMOL/L (ref 96–106)
CO2 SERPL-SCNC: 24 MMOL/L (ref 20–29)
CREAT SERPL-MCNC: 1.42 MG/DL (ref 0.76–1.27)
EOSINOPHIL # BLD AUTO: 0.1 X10E3/UL (ref 0–0.4)
EOSINOPHIL NFR BLD AUTO: 2 %
ERYTHROCYTE [DISTWIDTH] IN BLOOD BY AUTOMATED COUNT: 16.9 % (ref 12.3–15.4)
GLOBULIN SER CALC-MCNC: 2.4 G/DL (ref 1.5–4.5)
GLUCOSE SERPL-MCNC: 141 MG/DL (ref 65–99)
HCT VFR BLD AUTO: 33.1 % (ref 37.5–51)
HGB BLD-MCNC: 10.7 G/DL (ref 13–17.7)
IMM GRANULOCYTES # BLD AUTO: 0 X10E3/UL (ref 0–0.1)
IMM GRANULOCYTES NFR BLD AUTO: 0 %
LYMPHOCYTES # BLD AUTO: 0.8 X10E3/UL (ref 0.7–3.1)
LYMPHOCYTES NFR BLD AUTO: 13 %
MCH RBC QN AUTO: 25.2 PG (ref 26.6–33)
MCHC RBC AUTO-ENTMCNC: 32.3 G/DL (ref 31.5–35.7)
MCV RBC AUTO: 78 FL (ref 79–97)
MONOCYTES # BLD AUTO: 0.6 X10E3/UL (ref 0.1–0.9)
MONOCYTES NFR BLD AUTO: 10 %
NEUTROPHILS # BLD AUTO: 4.5 X10E3/UL (ref 1.4–7)
NEUTROPHILS NFR BLD AUTO: 74 %
NT-PROBNP SERPL-MCNC: ABNORMAL PG/ML (ref 0–376)
PLATELET # BLD AUTO: 215 X10E3/UL (ref 150–379)
POTASSIUM SERPL-SCNC: 3.9 MMOL/L (ref 3.5–5.2)
PROT SERPL-MCNC: 6.5 G/DL (ref 6–8.5)
RBC # BLD AUTO: 4.25 X10E6/UL (ref 4.14–5.8)
SODIUM SERPL-SCNC: 142 MMOL/L (ref 134–144)
WBC # BLD AUTO: 6 X10E3/UL (ref 3.4–10.8)

## 2019-04-03 RX ORDER — POTASSIUM CHLORIDE 20 MEQ/1
20 TABLET, EXTENDED RELEASE ORAL 2 TIMES DAILY
Qty: 60 TAB | Refills: 3 | Status: SHIPPED | OUTPATIENT
Start: 2019-04-03 | End: 2019-04-05 | Stop reason: SDUPTHER

## 2019-04-03 NOTE — TELEPHONE ENCOUNTER
Patient is calling to report that he lost 10 pounds overnight, now has no shortness of breath, no dizziness, decreased leg edema. He took 20 mg torsemide yesterday in the morning and 40 mg in the afternoon. Please advise, thank you. I called patient after speaking with Dr. Kaylee Zuñiga, advised him per Dr. Kaylee Zuñiga to take torsemide 40 mg per day, potassium chloride 20 mEQ BID, and have labs drawn tomorrow-patient will come to clinic to have labs drawn.

## 2019-04-04 ENCOUNTER — HOSPITAL ENCOUNTER (OUTPATIENT)
Dept: LAB | Age: 69
Discharge: HOME | End: 2019-04-04
Payer: MEDICARE

## 2019-04-04 LAB
ANION GAP SERPL CALC-SCNC: 7 MMOL/L (ref 5–15)
BUN SERPL-MCNC: 29 MG/DL (ref 6–20)
BUN/CREAT SERPL: 18 (ref 12–20)
CALCIUM SERPL-MCNC: 8.9 MG/DL (ref 8.5–10.1)
CHLORIDE SERPL-SCNC: 101 MMOL/L (ref 97–108)
CO2 SERPL-SCNC: 33 MMOL/L (ref 21–32)
CREAT SERPL-MCNC: 1.59 MG/DL (ref 0.7–1.3)
GLUCOSE SERPL-MCNC: 146 MG/DL (ref 65–100)
POTASSIUM SERPL-SCNC: 3.6 MMOL/L (ref 3.5–5.1)
SODIUM SERPL-SCNC: 141 MMOL/L (ref 136–145)

## 2019-04-04 PROCEDURE — 80048 BASIC METABOLIC PNL TOTAL CA: CPT

## 2019-04-05 ENCOUNTER — TELEPHONE (OUTPATIENT)
Dept: CARDIOLOGY CLINIC | Age: 69
End: 2019-04-05

## 2019-04-05 DIAGNOSIS — I50.20 SYSTOLIC HEART FAILURE, ACC/AHA STAGE C (HCC): ICD-10-CM

## 2019-04-05 DIAGNOSIS — R06.02 SHORTNESS OF BREATH: ICD-10-CM

## 2019-04-05 LAB — NT-PROBNP SERPL-MCNC: ABNORMAL PG/ML (ref 0–376)

## 2019-04-05 RX ORDER — POTASSIUM CHLORIDE 20 MEQ/1
20 TABLET, EXTENDED RELEASE ORAL 3 TIMES DAILY
Qty: 60 TAB | Refills: 3 | Status: SHIPPED | OUTPATIENT
Start: 2019-04-05 | End: 2019-01-01 | Stop reason: SDUPTHER

## 2019-04-05 NOTE — TELEPHONE ENCOUNTER
Reviewed labs with patient. Advised increase potassium to 20 meq TID. He will follow up in the [facility] on 4/10/19.

## 2019-04-09 ENCOUNTER — TELEPHONE (OUTPATIENT)
Dept: CARDIOLOGY CLINIC | Age: 69
End: 2019-04-09

## 2019-04-10 ENCOUNTER — OFFICE VISIT (OUTPATIENT)
Dept: CARDIOLOGY CLINIC | Age: 69
End: 2019-04-10

## 2019-04-10 VITALS
BODY MASS INDEX: 27.57 KG/M2 | DIASTOLIC BLOOD PRESSURE: 56 MMHG | TEMPERATURE: 96.7 F | SYSTOLIC BLOOD PRESSURE: 88 MMHG | WEIGHT: 192.6 LBS | HEART RATE: 76 BPM | HEIGHT: 70 IN | RESPIRATION RATE: 20 BRPM | OXYGEN SATURATION: 96 %

## 2019-04-10 DIAGNOSIS — Z87.19 H/O: GI BLEED: ICD-10-CM

## 2019-04-10 DIAGNOSIS — R06.02 SHORTNESS OF BREATH: Primary | ICD-10-CM

## 2019-04-10 DIAGNOSIS — E78.5 DYSLIPIDEMIA: ICD-10-CM

## 2019-04-10 DIAGNOSIS — I50.22 CHRONIC SYSTOLIC CONGESTIVE HEART FAILURE, NYHA CLASS 3 (HCC): ICD-10-CM

## 2019-04-10 NOTE — PROGRESS NOTES
[address] [facility] [place] [address] in [place], [place] Heart Failure Clinic Note Patient name: Alexei Mays Patient : 1950 Patient MRN: [id number] Date of service: 04/10/19 Primary care physician: Apolonia Butler MD 
Primary cardiologist: Dr. Eder Ivory CHIEF COMPLAINT: 
Chronic systolic heart failure PLAN: 
Continue current medical therapy for heart failure Continue current dose of toprol XL 50mg daily Half the dose of entresto 97/103mg twice daily due to hypotension and worsening renal function Cannot tolerate aldactone due to hyperkalemia; not interested to take valtessa Continue current dose of diuretic, torsemide 40mg daily Continue ASA, plavix and statin for CAD Check uric acid Continue amiodarone for PAF, check thyroid profile every 3 months and PFT/DLCO every year ICD interrogation every 3 months per routine, follow-up by Dr. Letty Booth Consider scheduling annual echocardiogram and EKG at the visit in 2019 Check CBC and iron profile due to drop of HGB Labs: CBC, BMP, LFT, pro-NT-BNP, iron profile, thyroid profile, vitamin D level, lipid profile, CPK Reinforced low salt diet Reinforced fluid restriction to 6 x 8oz glasses per day Discussed tobacco cessation and materials dispensed Discussed abstinence from illicit drugs Provided educational materials \"[facility]\" Advanced care plan present on the chart Counseled to obtain home scale and arm blood pressure cuff Document in diary BP/HR before and 2 hours after taking medications and daily weights Recommend flu vaccination annually and pneumonia vaccine every 5 years Follow-up with vascular surgery Dr. Timur Brownlee or Dr. Renetta Carver (patient will let us know if he decide to follow; prefers to research doctors first) Follow-up with GI on h/o GI bleed and colorectal surgery Follow-up with primary cardiologist 
Follow-up with EP cardiologist, Dr. Letty Booth Follow-up with PCP for diabetes management; patient declined [place] Patient declined referral to pulmonary for COPD and sleep study Return to AHF Clinic in early next week with NP/MD 
 
IMPRESSION: 
Fatigue Hypotension, drug induced Chronic systolic heart failure Stage C, NYHA class IIIA symptoms Coronary artery disease S/p impella assisted high risk PCI with MIKE to 100% p-RCA and 70% LM 2/9/17 by Dr. Bautista Fresh Ischemic cardiomyopathy, LVEF 25% HTN 
PAD not amenable to PTCA Incomplete LBBB, QRS 108ms CKD3 KELLY COPD S/p GI bleed and colorectal surgery Medical noncompliance CARDIAC IMAGING: 
Echo (7/11/18) LVEF 25%, akinesis of basal-mid inferior wall. PA pressure 42mmHg. ICD interrogation (12/6/18) no events, normal device function, good batteries HEMODYNAMICS: 
RHC not done CPEST not done 6MW not done HISTORY OF PRESENT ILLNESS: 
I had the pleasure of seeing Sharyn Catherine in [facility] at [address] [place] [address] in [facility]. Briefly, Sharyn Catherine is a 79 y.o. male with h/o CAD s/p PCI of LM,  of RCA, ischemic cardiomyopathy with LVEF 66%, s/p AICD complicated by  PAF (amiodarone), Hep C, GI bleed (s/p colorectal surgery), and remote tobacco abuse and PVD. INTERVAL HISTORY: 
Today, patient presents for routine clinic visit. Patient is doing alright, but c/o chronic fatigue and having no energy. His BP runs very low on increased dose of entresto, but he did not have symptoms of presyncope or syncope. Patient walked to our clinic from parking garage without having to slow down or stop. Patient can walk more than one block without symptoms of fatigue or shortness of breath. Patient can perform home activities without problem and routinely participates in daily walking for more than 15 minutes.   
 
Patient denies symptoms of volume overload, abdominal bloating or leg edema. Patient's weight remained stable. Patient denies weight gain or weight loss, or change of appetite. Patient denies irregular heart rate or palpitations. ICD has not fired. Patient denies other cardiac symptoms such as chest pain or leg pain with walking. Patient is compliant with fluid restriction and taking medications as prescribed. Patient manages medications himself. REVIEW OF SYSTEMS: 
General: Denies fever, night sweats. Ear, nose and throat: Denies difficulty hearing, sinus problems, runny nose, post-nasal drip, ringing in ears, mouth sores, loose teeth, ear pain, nosebleeds, sore throate, facial pain or numbess Cardiovascular: see above in the interval history Respiratory: Denies cough, wheezing, sputum production, hemoptysis. Gastrointestinal: Denies heartburn, constipation, intolerance to certain foods, diarrhea, abdominal pain, nausea, vomiting, difficulty swallowing, blood in stool Kidney and bladder: Denies painful urination, frequent urination, urgency, prostate problems and impotence Musculoskeletal: Denies joint pain, muscle weakness Skin and hair: Denies change in existing skin lesions, hair loss or increase, breast changes PHYSICAL EXAM: 
Vital signs:  
Visit Vitals BP (!) 88/56 (BP 1 Location: Right arm, BP Patient Position: Sitting) Pulse 76 Temp 96.7 °F (35.9 °C) (Oral) Resp 20 Ht 5' 10\" (1.778 m) Wt 192 lb 9.6 oz (87.4 kg) SpO2 96% BMI 27.64 kg/m² General: Patient is well developed, well-nourished in no acute distress HEENT: Normocephalic and atraumatic. No scleral icterus. Pupils are equal, round and reactive to light and accomodation. No conjunctival injection. Oropharynx is clear. Neck: Supple. No evidence of thyroid enlargements or lymphadenopathy. JVD: Cannot be appreciated Lungs: Breath sounds are equal and clear bilaterally. No wheezes, rhonchi, or rales. Heart: Regular rate and rhythm with normal S1 and S2.  No murmurs, gallops or rubs. Abdomen: Soft, no mass or tenderness. No organomegaly or hernia. Bowel sounds present. Genitourinary and rectal: deferred Extremities: No cyanosis, clubbing, or edema. Neurologic: No focal sensory or motor deficits are noted. Grossly intact. Psychiatric: Awake, alert an doriented x 3. Appropriate mood and affect. Skin: Warm, dry and well perfused. No lesions, nodules or rashes are noted. PAST MEDICAL HISTORY: 
Past Medical History:  
Diagnosis Date  Arthritis   
 hands/fingers  CAD (coronary artery disease)   
 involving native coronary artery of native heart without angina pectoris  Cardiomyopathy ([facility] 01/20/2017 A. Echo (1/19/17):  EF 5-10% with severe GHK,. Mildly dil LA. Mild TR. PASP 46./systemic cardiomyopathy  Chronic kidney disease  Chronic obstructive pulmonary disease ([address]  Chronic renal insufficiency 03/06/2017  Chronic systolic congestive heart failure, NYHA class 3 ([address] 1/8/2019  Claudication ([facility] 10/9/2018  Congestive heart failure ([address]  Diabetes mellitus ([facility] 3/6/2017 IDDM  Dyslipidemia 3/6/2017 A. FLP (1/19/17): Tot 120, , HDL 19, LDL 76 (no Rx).  H/O: GI bleed 3/6/2017  Hepatitis C 3/6/2017  Hypokalemia  ICD (implantable cardioverter-defibrillator) in place  Ill-defined condition   
 flu 1/2018  Neuropathy 11/19/2018  Noncompliance with medication regimen 9/11/2018  Paroxysmal atrial fibrillation ([address] 3/6/2017  Peripheral vascular disease ([facility] 9/18/2017 A. RICKY (3/20/17): Right 0.58, Left 0.56.  Tobacco dependence syndrome 10/9/2018  Vitamin D deficiency 6/4/2018 PAST SURGICAL HISTORY: 
Past Surgical History:  
Procedure Laterality Date  COLONOSCOPY N/A 2/17/2017 COLONOSCOPY performed by Luigi Godinez. Yanick Colmenares MD at [facility] ENDOSCOPY  COLONOSCOPY N/A 2/5/2018 COLONOSCOPY performed by Luigi Godinez. Yanick Colmenares MD at [address] [place] [address] LM, RCA x 2   
 HX GI    
 colonoscopy x 3 - last 2017 - polyp  HX IMPLANTABLE CARDIOVERTER DEFIBRILLATOR    
 
 
FAMILY HISTORY: 
Family History Problem Relation Age of Onset  Hypertension Mother  Heart Disease Mother MURMUR  
 Cancer Father COLON  
 Colon Cancer Father  Other Sister   
     born totally handicapped/epileptic  Kidney Disease Sister   
      from renal shutdown  Heart Disease Brother  Other Brother ?pancreatic cancer or ?pancreatitis  Cancer Maternal Uncle   
     toes and spread  Cancer Paternal Uncle   
     stomach  
 Heart Attack Maternal Grandfather 47  Cancer Paternal Grandfather   
     bone  Cancer Maternal Uncle   
     stomach  Cancer Maternal Uncle   
     lung  Anesth Problems Neg Hx SOCIAL HISTORY: 
Social History Socioeconomic History  Marital status:  Spouse name: Not on file  Number of children: Not on file  Years of education: Not on file  Highest education level: Not on file Tobacco Use  Smoking status: Current Some Day Smoker Packs/day: 0.50 Years: 45.00 Pack years: 22.50 Last attempt to quit: 2017 Years since quittin.2  Smokeless tobacco: Never Used  Tobacco comment: using Nicotine patches Substance and Sexual Activity  Alcohol use: No  
 Drug use: Yes Types: Marijuana  Sexual activity: Never Partners: Female LABORATORY RESULTS: 
  
Labs Latest Ref Rng & Units 2019 WBC 3.4 - 10.8 x10E3/uL - 6.0  
RBC 4.14 - 5.80 x10E6/uL - 4.25 Hemoglobin 13.0 - 17.7 g/dL - 10. 7(L) Hematocrit 37.5 - 51.0 % - 33. 1(L) MCV 79 - 97 fL - 78(L) Platelets 833 - 920 V92V7/DG - 215 Monocytes Not Estab. % - 10 Eosinophils Not Estab. % - 2 Basophils Not Estab. % - 1 Albumin 3.6 - 4.8 g/dL - 4.1 Calcium 8.5 - 10.1 MG/DL 8.9 8.7 SGOT 0 - 40 IU/L - 16 Glucose 65 - 100 mg/dL 146(H) 141(H) BUN 6 - 20 MG/DL 29(H) 25 Creatinine 0.70 - 1.30 MG/DL 1.59(H) 1.42(H) Sodium 136 - 145 mmol/L 141 142 Potassium 3.5 - 5.1 mmol/L 3.6 3.9 Some recent data might be hidden Lab Results Component Value Date/Time TSH 1.050 12/06/2018 01:56 PM  
 TSH 3.040 12/04/2017 10:17 AM  
 TSH 2.04 01/22/2017 11:11 AM  
 TSH 1.49 01/18/2017 11:12 PM  
 
 
CURRENT MEDICATIONS: 
 
Current Outpatient Medications:  
  potassium chloride (K-DUR, KLOR-CON) 20 mEq tablet, Take 1 Tab by mouth three (3) times daily. , Disp: 60 Tab, Rfl: 3 
  torsemide (DEMADEX) 20 mg tablet, Take 2 Tabs by mouth daily. , Disp: 120 Tab, Rfl: 3 
  metoprolol succinate (TOPROL-XL) 100 mg tablet, Take 0.5 Tabs by mouth daily for 360 days. Indications: chronic heart failure, Disp: 90 Tab, Rfl: 1 
  atorvastatin (LIPITOR) 40 mg tablet, TAKE ONE TABLET BY MOUTH ONCE DAILY  Indications: excessive fat in the blood, Disp: 90 Tab, Rfl: 3 
  clopidogrel (PLAVIX) 75 mg tab, Take 1 Tab by mouth daily. , Disp: 90 Tab, Rfl: 3 
  insulin NPH/insulin regular (NOVOLIN 70/30 U-100 INSULIN) 100 unit/mL (70-30) injection, INJECT 10 UNITS SUBCUTANEOUSLY IN THE MORNING AND 8 UNIT IN THE EVENING, Disp: 5 Vial, Rfl: 5 
  sacubitril-valsartan (ENTRESTO) 97 mg/103 mg tablet, Take 1 Tab by mouth two (2) times a day., Disp: 180 Tab, Rfl: 3   cholecalciferol (VITAMIN D3) 1,000 unit tablet, Take 2 Tabs by mouth daily. , Disp: , Rfl:  
  aspirin 81 mg chewable tablet, Take 81 mg by mouth daily. , Disp: , Rfl:  
  amiodarone (CORDARONE) 200 mg tablet, Take 1 Tab by mouth daily. Indications: PREVENTION OF RECURRENT ATRIAL FIBRILLATION, Disp: 30 Tab, Rfl: 6 
  insulin NPH/insulin regular (NOVOLIN 70/30) 100 unit/mL (70-30) injection, 10 Units by SubCUTAneous route Daily (before breakfast). , Disp: , Rfl:  
  insulin NPH/insulin regular (NOVOLIN 70/30) 100 unit/mL (70-30) injection, 8 Units by SubCUTAneous route Daily (before dinner). , Disp: , Rfl:  
   acetaminophen (TYLENOL EXTRA STRENGTH) 500 mg tablet, Take 1,000 mg by mouth every six (6) hours as needed for Pain. Indications: BACK PAIN, Disp: , Rfl:  
  Insulin Syringes, Disposable, 1 mL syrg, Pt to take 10 units w/ breakfast and 8 units w/ dinner, Disp: 500 Syringe, Rfl: 1 Thank you for your referral and allowing me to participate in this patient's care. Marilu Cee MD [address], [address] Phone: [phone] Fax: [phone]

## 2019-04-10 NOTE — PATIENT INSTRUCTIONS
Take 1/2 tab of Entresto twice daily to equal 49/51mg CONTINUE CURRENT medications Take twice a day medications 12 hours apart with food Labs today Follow up with [facility] next week ( Monday or Tuesday) Check BP at home in AM and 2 hours after take medication. Call [facility] clinic with BP readings. HF Education: Continue daily weights (in the morning, after voiding). Notify HF team of overnight weight gains > 2 lbs or weekly >5 lbs or if any of the following Sx. Continue to limit sodium intake & monitor your fluid intake . [facility]: Dr. Allison Munoz or Dr. Babita Menchaca

## 2019-04-11 ENCOUNTER — TELEPHONE (OUTPATIENT)
Dept: CARDIOLOGY CLINIC | Age: 69
End: 2019-04-11

## 2019-04-11 DIAGNOSIS — E05.90 HYPERTHYROIDISM: Primary | ICD-10-CM

## 2019-04-11 LAB
ALBUMIN SERPL-MCNC: 3.8 G/DL (ref 3.6–4.8)
ALP SERPL-CCNC: 56 IU/L (ref 39–117)
ALT SERPL-CCNC: 11 IU/L (ref 0–44)
AST SERPL-CCNC: 12 IU/L (ref 0–40)
BILIRUB DIRECT SERPL-MCNC: 0.23 MG/DL (ref 0–0.4)
BILIRUB SERPL-MCNC: 0.5 MG/DL (ref 0–1.2)
BUN SERPL-MCNC: 35 MG/DL (ref 8–27)
BUN/CREAT SERPL: 23 (ref 10–24)
CALCIUM SERPL-MCNC: 8.8 MG/DL (ref 8.6–10.2)
CHLORIDE SERPL-SCNC: 101 MMOL/L (ref 96–106)
CHOLEST SERPL-MCNC: 108 MG/DL (ref 100–199)
CK SERPL-CCNC: 42 U/L (ref 24–204)
CO2 SERPL-SCNC: 24 MMOL/L (ref 20–29)
CREAT SERPL-MCNC: 1.51 MG/DL (ref 0.76–1.27)
ERYTHROCYTE [DISTWIDTH] IN BLOOD BY AUTOMATED COUNT: 16.6 % (ref 12.3–15.4)
GLUCOSE SERPL-MCNC: 195 MG/DL (ref 65–99)
HCT VFR BLD AUTO: 39.5 % (ref 37.5–51)
HDLC SERPL-MCNC: 32 MG/DL
HGB BLD-MCNC: 12.3 G/DL (ref 13–17.7)
IRON SATN MFR SERPL: 9 % (ref 15–55)
IRON SERPL-MCNC: 29 UG/DL (ref 38–169)
LACTATE SERPL-MCNC: NORMAL MG/DL
LDLC SERPL CALC-MCNC: 59 MG/DL (ref 0–99)
MCH RBC QN AUTO: 25.3 PG (ref 26.6–33)
MCHC RBC AUTO-ENTMCNC: 31.1 G/DL (ref 31.5–35.7)
MCV RBC AUTO: 81 FL (ref 79–97)
MORPHOLOGY BLD-IMP: ABNORMAL
NT-PROBNP SERPL-MCNC: 9533 PG/ML (ref 0–376)
PLATELET # BLD AUTO: 110 X10E3/UL (ref 150–379)
POTASSIUM SERPL-SCNC: 4.5 MMOL/L (ref 3.5–5.2)
PROT SERPL-MCNC: 6.6 G/DL (ref 6–8.5)
RBC # BLD AUTO: 4.87 X10E6/UL (ref 4.14–5.8)
SODIUM SERPL-SCNC: 143 MMOL/L (ref 134–144)
T3FREE SERPL-MCNC: 3.9 PG/ML (ref 2–4.4)
T4 FREE SERPL-MCNC: 2.1 NG/DL (ref 0.82–1.77)
TIBC SERPL-MCNC: 309 UG/DL (ref 250–450)
TRIGL SERPL-MCNC: 86 MG/DL (ref 0–149)
TSH SERPL DL<=0.005 MIU/L-ACNC: 0.05 UIU/ML (ref 0.45–4.5)
UIBC SERPL-MCNC: 280 UG/DL (ref 111–343)
URATE SERPL-MCNC: 8.9 MG/DL (ref 3.7–8.6)
VLDLC SERPL CALC-MCNC: 17 MG/DL (ref 5–40)
WBC # BLD AUTO: 5.4 X10E3/UL (ref 3.4–10.8)

## 2019-04-11 NOTE — TELEPHONE ENCOUNTER
Patient calling with report of BP last night 120/65, this morning 150/80, HR 60. He states he took half dose of entresto this morning. He states his weight is stable at 196 (stable per his scale). He is not feeling nearly as bloated or short of breath. I called patient and advised him per JING Perez,:  Ingrid Passer!  Continue the 1/2 dose of Entresto twice a day and monitoring his BP in the morning and 2 hours after taking medications.  Ask him to bring his BP cuff to his next clinic visit.      he states understanding

## 2019-04-11 NOTE — PROGRESS NOTES
[facility] Progress Note      NAME:  Fernanda Zeng. :   1950   MRN:   [id number]   PCP:  None  CARD:   Dr. Venessa Chandler    Date:  2017     Fernanda Oates is a 61 y.o. male with a who presented for further evaluation of severe CAD and systolic heart failure. Subjective:   He was initially seen at [facility] 3 years ago and told that he had heart failure with LVEF 30%. He was lost to follow up due to lack of insurance and has not been seen by a physician in the interim. He complains of progressive fatigue, NAVARRO, PND, and edema over the past year, prompting presentation to [facility]. He also complains of lower extremity bullae, weeping, and pain. He denies palpitations, presyncope, syncope, or chest pain. Past 24 hours:  Denies dyspnea  Complains of fatigue  Appropriately concerned about risks of LM stent      Objective:     Visit Vitals    /64 (BP 1 Location: Right arm, BP Patient Position: Sitting)    Pulse 84    Temp 98 °F (36.7 °C)    Resp 18    Ht 5' 9\" (1.753 m)    Wt 85.6 kg (188 lb 11.4 oz)    SpO2 97%    BMI 27.87 kg/m2          General:  fatigued    HEENT: Normocephalic, EOMI, PERRLA, Hearing intact, trachea mid-line    Neck:  supple, no significant adenopathy, carotids upstroke normal bilaterally, no bruits    CVP:  10  cm  ( + ) HJR    Heart:  Diminished PMI    Normal S1 and S2, S3 gallop    Murmur: 2/6 systolic murmur    Lungs: Diminished breath sounds left lower lung    Abdomen: soft, non-tender. Bowel sounds normal. +HSM    Extremity: Warm, red on the right lower extremity with 2+ pitting edema bilaterally    Neuro: Alert and oriented to person, place, and time; normal strength and tone. Normal symmetric reflexes. Normal coordination and gait      1901 -  0700  In: 1974.8 [P.O.:1324;  I.V.:650.8]  Out: 3475 [Urine:3475]  O2 Flow Rate (L/min): 1 l/min O2 Device: Room air  Temp (24hrs), Av.2 °F (36.8 °C), Min:97.4 °F (36.3 °C), Patient refused Incentive Spirometry therapy. [facility] has educated the patient on the purpose of and need for this therapy as well as potential negative outcomes associated with deferring treatment.  Despite education, patient maintains refusal. Max:98.7 °F (37.1 °C)          Care Plan discussed with:    Comments   Patient x    Family      RN x    Care Manager                    Consultant:          Past History:     Past Medical History   Diagnosis Date    Cardiomyopathy ([address] 1/20/2017     A. Echo (1/19/17):  EF 5-10% with severe GHK,. Mildly dil LA. Mild TR. PASP 46. No past surgical history on file. Social History   Substance Use Topics    Smoking status: Not on file    Smokeless tobacco: Not on file    Alcohol use Not on file        No family history on file. Allergies:   No Known Allergies       Data Review:     CXR:   CXR Results  (Last 48 hours)    None            Echocardiogram:   Echo Results  (Last 48 hours)    None          No results found for this visit on 01/20/17. ECG:  EKG: ST with occasional PVC, NSIVCD, LAE    LABS:  Recent Results (from the past 24 hour(s))   GLUCOSE, POC    Collection Time: 02/01/17  9:40 PM   Result Value Ref Range    Glucose (POC) 90 65 - 100 mg/dL    Performed by Unkown     METABOLIC PANEL, COMPREHENSIVE    Collection Time: 02/02/17  4:29 AM   Result Value Ref Range    Sodium 133 (L) 136 - 145 mmol/L    Potassium 4.2 3.5 - 5.1 mmol/L    Chloride 97 97 - 108 mmol/L    CO2 29 21 - 32 mmol/L    Anion gap 7 5 - 15 mmol/L    Glucose 168 (H) 65 - 100 mg/dL    BUN 40 (H) 6 - 20 MG/DL    Creatinine 1.96 (H) 0.70 - 1.30 MG/DL    BUN/Creatinine ratio 20 12 - 20      GFR est AA 42 (L) >60 ml/min/1.73m2    GFR est non-AA 35 (L) >60 ml/min/1.73m2    Calcium 9.2 8.5 - 10.1 MG/DL    Bilirubin, total 0.5 0.2 - 1.0 MG/DL    ALT (SGPT) <6 (L) 12 - 78 U/L    AST (SGOT) 11 (L) 15 - 37 U/L    Alk.  phosphatase 99 45 - 117 U/L    Protein, total 6.9 6.4 - 8.2 g/dL    Albumin 2.6 (L) 3.5 - 5.0 g/dL    Globulin 4.3 (H) 2.0 - 4.0 g/dL    A-G Ratio 0.6 (L) 1.1 - 2.2     MAGNESIUM    Collection Time: 02/02/17  4:29 AM   Result Value Ref Range    Magnesium 1.8 1.6 - 2.4 mg/dL   CBC W/O DIFF    Collection Time: 02/02/17  4:29 AM   Result Value Ref Range    WBC 8.7 4.1 - 11.1 K/uL    RBC 4.14 4.10 - 5.70 M/uL    HGB 10.0 (L) 12.1 - 17.0 g/dL    HCT 31.8 (L) 36.6 - 50.3 %    MCV 76.8 (L) 80.0 - 99.0 FL    MCH 24.2 (L) 26.0 - 34.0 PG    MCHC 31.4 30.0 - 36.5 g/dL    RDW 16.4 (H) 11.5 - 14.5 %    PLATELET 650 (L) 494 - 400 K/uL   PTT    Collection Time: 02/02/17  4:29 AM   Result Value Ref Range    aPTT 28.1 22.1 - 32.5 sec    aPTT, therapeutic range     58.0 - 77.0 SECS   GLUCOSE, POC    Collection Time: 02/02/17  7:04 AM   Result Value Ref Range    Glucose (POC) 113 (H) 65 - 100 mg/dL    Performed by Jaguar Jordan    GLUCOSE, POC    Collection Time: 02/02/17 11:41 AM   Result Value Ref Range    Glucose (POC) 91 65 - 100 mg/dL    Performed by Héctor Mg 92, POC    Collection Time: 02/02/17  4:28 PM   Result Value Ref Range    Glucose (POC) 80 65 - 100 mg/dL    Performed by Unkown       All Micro Results     Procedure Component Value Units Date/Time    CULTURE, BODY FLUID Consuelo Umana STAIN [[id number]] Collected:  01/26/17 1505    Order Status:  Completed Specimen:  Thoracentesis Updated:  01/30/17 1057     Special Requests: NO SPECIAL REQUESTS        GRAM STAIN OCCASIONAL  WBCS SEEN         NO ORGANISMS SEEN        Culture result: NO GROWTH 4 DAYS       CULTURE, Melanie Martini STAIN [[id number]] Collected:  01/25/17 2134    Order Status:  Completed Specimen:  Leg Updated:  01/27/17 1440     Special Requests: NO SPECIAL REQUESTS        GRAM STAIN RARE  WBCS SEEN         NO ORGANISMS SEEN        Culture result: LIGHT  STREPTOCOCCI, BETA HEMOLYTIC GROUP C  . .. Penicillin and ampicillin are drugs of choice for treatment of beta-hemolytic streptococcal infections. Susceptibility testing of penicillins and beta-lactams approved by the FDA for treatment of beta-hemolytic streptococcal infections need not be performed routinely, because nonsusceptible isolates are extremely rare.  CLSI 2012         MODERATE  CANDIDA TROPICALIS             Specimen Source   1: Pleural Fluid   CYTOLOGIC INTERPRETATION:   Scattered mesothelial cells with degenerative changes and mixed inflammatory cells   General Categorization   No cells diagnostic for malignancy   Specimen Adequacy   Satisfactory for evaluation        IMPRESSION  1. Markedly dilated left ventricle with 3-D end-diastolic volume index to body  surface area of 153 mL/sq m. Mild eccentric left ventricular hypertrophy with  3-D mass index to body surface area of 85 g/sq m. Severe left ventricular  systolic dysfunction. Severe global hypokinesis with regional variation. 3-D  LVEF 10%. 2. Moderately dilated right ventricle by 3-D volumetric assessment. RV end  diastolic volume indexed to body surface area of 138 mL/sq m. Moderate to severe  right ventricular systolic dysfunction. Global hypokinesis. 3-D RVEF 25%. 3. Apical a tethered mitral valve leaflets. Mild mitral regurgitation. 4. Moderately severe 3+ tricuspid regurgitation. 5. On LGE study, the anterior wall, anteroseptal wall, anteroapical wall, and  anterior lateral wall are largely viable without significant myocardial  infarction. The entire inferior wall and inferoseptal wall are completely viable  without any infarct. The base to mid inferolateral wall demonstrate a near  transmural greater than 75% thickness infarct with minimal or no viable  myocardium in this territory. The distal inferolateral wall, apical lateral wall  does not demonstrate any infarct and are largely viable. Based on the viability  imaging, the entire LAD territory is largely viable and should recover upon  revascularization. The entire RCA territory is largely viable and should recover  upon revascularization. The LCx territory demonstrate medium-size infarct with  limited viability. 6. Large left-sided pleural effusion with passive atelectasis of the left lung. 7. Dilated branch pulmonary arteries suggest pulmonary hypertension.   8. Markedly dilated left atrium measuring 59 x 55 mm. Moderately dilated right  atrium measuring 46 x 56 mm. Medications reviewed:    Current Facility-Administered Medications   Medication Dose Route Frequency    nicotine (NICODERM CQ) 21 mg/24 hr patch 1 Patch  1 Patch TransDERmal DAILY    glimepiride (AMARYL) tablet 4 mg  4 mg Oral ACB    insulin glargine (LANTUS) injection 10 Units  10 Units SubCUTAneous DAILY    gabapentin (NEURONTIN) capsule 100 mg  100 mg Oral BID    bumetanide (BUMEX) tablet 2 mg  2 mg Oral TID    milrinone (PRIMACOR) 20 MG/100 ML D5W infusion  0.3 mcg/kg/min IntraVENous CONTINUOUS    carvedilol (COREG) tablet 3.125 mg  3.125 mg Oral BID WITH MEALS    amiodarone (CORDARONE) tablet 200 mg  200 mg Oral Q12H    bacitracin 500 unit/gram packet 1 Packet  1 Packet Topical PRN    atorvastatin (LIPITOR) tablet 10 mg  10 mg Oral DAILY    ceFAZolin (ANCEF) 1 g in 0.9% sodium chloride (MBP/ADV) 50 mL  1 g IntraVENous Q8H    0.9% sodium chloride infusion  10 mL/hr IntraVENous CONTINUOUS    acetaminophen (TYLENOL) tablet 650 mg  650 mg Oral Q6H PRN    aspirin delayed-release tablet 81 mg  81 mg Oral DAILY    glucagon (GLUCAGEN) injection 1 mg  1 mg IntraMUSCular PRN    glucose chewable tablet 16 g  4 Tab Oral PRN    insulin lispro (HUMALOG) injection   SubCUTAneous AC&HS    magnesium oxide (MAG-OX) tablet 400 mg  400 mg Oral DAILY    0.9% sodium chloride infusion  3 mL/hr IntraVENous CONTINUOUS    sodium chloride (NS) flush 5-10 mL  5-10 mL IntraVENous Q8H    sodium chloride (NS) flush 5-10 mL  5-10 mL IntraVENous PRN    albuterol (PROVENTIL VENTOLIN) nebulizer solution 2.5 mg  2.5 mg Nebulization Q4H PRN    diphenhydrAMINE (BENADRYL) capsule 25 mg  25 mg Oral QHS PRN    oxyCODONE-acetaminophen (PERCOCET) 5-325 mg per tablet 2 Tab  2 Tab Oral Q4H PRN    ELECTROLYTE REPLACEMENT PROTOCOL  1 Each Other PRN           IMPRESSION:   1.  Acute on chronic systolic heart failure (ICM) - Stage D, NYHA Class IV  2. Severe native coronary artery disease  3. Diabetes Mellitus, Hga1c 13.5  4. History of tobacco abuse  5. Cellulitis  6. CHACORTA on CKD3  7. Pulmonary HTN  8. Persistent atrial fibrillation  9. Hyponatremia  10. Left pleural effusion       PLAN:   1. Increase IV milrinone 0.3 mcg/kg/min, Follow I/O, Replete electrolytes, Hold ACE-I due to CHACORTA on CKD. Continue low dose coreg and IV bumex to 2 mg tid  2. Continue ASA, statin, low dose BB; too high risk for CABG d/t low LVEF and diabetes out of control  3. Viable LAD and RCA territory and limited LCx viability - discussed high risk Impella supported Left Main PCI with Mary López and Fernando Mclean  4. Discussed risks/benefits including death of left main stent as well as the risks of death with medical therapy alone - encouraged patient to discuss his ACP with his wife and family  11. Lantus and sliding scale insulin, accuchecks, diabetic education  6. IV cefazolin 1 gram q 8 hours  7. Smoking cessation counseling, nicotine patch         Thank you for letting me see him with you,    Dorie Patel MD, [place] [id number] Director  Jennifer Roman [address] [place] [address], [address] [place] [address], [address] [place] [address]  Office: [phone]  Fax: [phone]  24 hour VAD/HF Pager: [phone]

## 2019-04-11 NOTE — TELEPHONE ENCOUNTER
Lab alert iron saturation 9    I called patient , advised him per JING Winters:  Order Venofer infusion 200mg x2 doses at [facility] his labs results show TSH 0.049 & Free T4 2.10 he will need a Endocrine referal to Dr. Mckenna Cuello. Vandana Gin acid is elevated at 8.9 we will continue to monitor.       Venofer infusion orders faxed to [place]. I notified patient. Referal faxed to Dr. Mckenna Cuello.

## 2019-04-12 ENCOUNTER — TELEPHONE (OUTPATIENT)
Dept: CARDIOLOGY CLINIC | Age: 69
End: 2019-04-12

## 2019-04-12 NOTE — TELEPHONE ENCOUNTER
I spoke to patient regarding his appointment with Endocrinology. He is scheduled to see Dr. Aram Anthony  6-6-19  1:30 pm  [address]  He has been placed on their cancellation list as well.     This information has been mailed to patient per his request.

## 2019-04-15 ENCOUNTER — CLINICAL SUPPORT (OUTPATIENT)
Dept: CARDIOLOGY CLINIC | Age: 69
End: 2019-04-15

## 2019-04-15 VITALS
BODY MASS INDEX: 26.88 KG/M2 | OXYGEN SATURATION: 96 % | HEIGHT: 70 IN | HEART RATE: 88 BPM | DIASTOLIC BLOOD PRESSURE: 64 MMHG | SYSTOLIC BLOOD PRESSURE: 98 MMHG | WEIGHT: 187.8 LBS | TEMPERATURE: 97.4 F | RESPIRATION RATE: 24 BRPM

## 2019-04-15 DIAGNOSIS — I25.5 ISCHEMIC CARDIOMYOPATHY: ICD-10-CM

## 2019-04-15 DIAGNOSIS — I42.9 CARDIOMYOPATHY, UNSPECIFIED TYPE (HCC): ICD-10-CM

## 2019-04-15 DIAGNOSIS — E78.5 DYSLIPIDEMIA: ICD-10-CM

## 2019-04-15 DIAGNOSIS — G62.9 NEUROPATHY: ICD-10-CM

## 2019-04-15 DIAGNOSIS — I50.22 CHRONIC SYSTOLIC CONGESTIVE HEART FAILURE, NYHA CLASS 3 (HCC): Primary | ICD-10-CM

## 2019-04-15 DIAGNOSIS — E11.8 TYPE 2 DIABETES MELLITUS WITH COMPLICATION, UNSPECIFIED WHETHER LONG TERM INSULIN USE: ICD-10-CM

## 2019-04-15 NOTE — PATIENT INSTRUCTIONS
Please cont to take your medications as directed- no dose changes today    Please cont to be as active as tolerated    Please cont to eat a low sodium diet    Call the office if you have a weight gain of more then 2lbs overnight and 5lbs in 1 week    Please keep an eye on your weight    Please call the office if you notice more swelling or feel that the fluid pills are not helping.      Entresto 49/51mg (1/2 tab) twice a day

## 2019-04-15 NOTE — PROGRESS NOTES
Briefly, Lolita Joaquin is a 79 y.o. male with h/o CAD s/p PCI of LM,  of RCA, ischemic cardiomyopathy with LVEF 44%, s/p AICD complicated by  PAF (amiodarone), Hep C, GI bleed (s/p colorectal surgery), and remote tobacco abuse and PVD. Seen in clinic today for BP check since resuming his Entresto at 1/2 dose. He denies dizziness, leg edema has greatly improved and he is no longer SOB.      Visit Vitals  BP 98/64 (BP 1 Location: Left arm, BP Patient Position: Sitting)   Pulse 88   Temp 97.4 °F (36.3 °C) (Oral)   Resp 24   Ht 5' 10\" (1.778 m)   Wt 187 lb 12.8 oz (85.2 kg)   SpO2 96%   BMI 26.95 kg/m²     Orthostatics  Lyin/68,  84  Sittin/60, 84  Standin/62, 92    A/P:   Hypotension: drug induced  Improved  Cont 49/51mg of Entresto  No other changes to current regimen  Follow up in 2 weeks with Dr. Tejas Ambriz

## 2019-04-26 ENCOUNTER — TELEPHONE (OUTPATIENT)
Dept: CARDIOLOGY CLINIC | Age: 69
End: 2019-04-26

## 2019-04-29 ENCOUNTER — OFFICE VISIT (OUTPATIENT)
Dept: CARDIOLOGY CLINIC | Age: 69
End: 2019-04-29

## 2019-04-29 VITALS
HEIGHT: 70 IN | SYSTOLIC BLOOD PRESSURE: 86 MMHG | HEART RATE: 88 BPM | RESPIRATION RATE: 16 BRPM | WEIGHT: 184.2 LBS | TEMPERATURE: 96.8 F | BODY MASS INDEX: 26.37 KG/M2 | OXYGEN SATURATION: 97 % | DIASTOLIC BLOOD PRESSURE: 56 MMHG

## 2019-04-29 DIAGNOSIS — R06.02 SHORTNESS OF BREATH: ICD-10-CM

## 2019-04-29 DIAGNOSIS — F17.200 TOBACCO DEPENDENCE SYNDROME: ICD-10-CM

## 2019-04-29 DIAGNOSIS — I50.20 SYSTOLIC HEART FAILURE, ACC/AHA STAGE C (HCC): ICD-10-CM

## 2019-04-29 DIAGNOSIS — I48.0 PAROXYSMAL ATRIAL FIBRILLATION (HCC): ICD-10-CM

## 2019-04-29 DIAGNOSIS — R06.09 DOE (DYSPNEA ON EXERTION): ICD-10-CM

## 2019-04-29 DIAGNOSIS — Z91.14 NONCOMPLIANCE WITH MEDICATION REGIMEN: ICD-10-CM

## 2019-04-29 DIAGNOSIS — I50.22 CHRONIC SYSTOLIC CONGESTIVE HEART FAILURE, NYHA CLASS 3 (HCC): Primary | ICD-10-CM

## 2019-04-29 RX ORDER — AMIODARONE HYDROCHLORIDE 200 MG/1
200 TABLET ORAL 2 TIMES DAILY
Qty: 30 TAB | Refills: 6
Start: 2019-04-29 | End: 2019-05-14 | Stop reason: SDUPTHER

## 2019-04-29 RX ORDER — TORSEMIDE 20 MG/1
20 TABLET ORAL DAILY
Qty: 120 TAB | Refills: 3 | Status: SHIPPED | OUTPATIENT
Start: 2019-04-29 | End: 2019-05-03 | Stop reason: SDUPTHER

## 2019-04-29 RX ORDER — BUPROPION HYDROCHLORIDE 150 MG/1
150 TABLET ORAL
Qty: 30 TAB | Refills: 0 | Status: SHIPPED | OUTPATIENT
Start: 2019-04-29 | End: 2019-05-14

## 2019-04-29 RX ORDER — CHOLECALCIFEROL (VITAMIN D3) 125 MCG
5000 CAPSULE ORAL DAILY
Qty: 30 CAP | Refills: 2 | Status: SHIPPED | OUTPATIENT
Start: 2019-04-29 | End: 2019-01-01 | Stop reason: SDUPTHER

## 2019-04-29 RX ORDER — AMIODARONE HYDROCHLORIDE 200 MG/1
200 TABLET ORAL 2 TIMES DAILY
Qty: 60 TAB | Refills: 0 | Status: SHIPPED | OUTPATIENT
Start: 2019-04-29 | End: 2019-05-30 | Stop reason: SDUPTHER

## 2019-04-29 NOTE — PATIENT INSTRUCTIONS
Resume your Amiodarone 200mg twice daily to prevent atrial fibrillation     START Wellbutrin XL 150mg once a day to help quit smoking    DECREASE  Entresto 24/26mg twice a day and torsemide 20 mg daily    CONTINUE CURRENT medications    Take twice a day medications 12 hours apart with food    Labs today BMP, proBNP, cbc, iron profile    Limit WHITE foods ( flour, sugar, pasta, rice, potatoes)     Keep a positive attitude     Follow up with [facility] in 3 weeks    Future Appointments   Date Time Provider Shelley Rucker   5/3/2019 10:00 AM [facility] INFUSION NURSE [address]   5/10/2019 11:00 AM [facility] INFUSION NURSE 1 [facility] H   6/5/2019 10:15 AM PACEMAKER, [facility] SCHED   6/6/2019  1:30 PM Nahun Gomez MD RDE YENI [address] [facility] [address]     HF Education: Continue daily weights (in the morning, after voiding). Notify HF team of overnight weight gains > 2 lbs or weekly >5 lbs or if any of the following Sx. Continue to limit sodium intake & monitor your fluid intake . As well as daily monitoring of daily BP in morning and 2 hours after taking medications.

## 2019-04-29 NOTE — PROGRESS NOTES
Preet Ernandez. is a 79 y.o. male    Chief Complaint   Patient presents with    Follow-up     2 week f/u     1. Have you been to the ER, urgent care clinic since your last visit? Hospitalized since your last visit? No    2. Have you seen or consulted any other health care providers outside of the [address] [place] [address] since your last visit? Include any pap smears or colon screening.  No     Visit Vitals  BP 98/60 (BP 1 Location: Left arm, BP Patient Position: Sitting)   Pulse 88   Temp 96.8 °F (36 °C) (Oral)   Resp 16   Ht 5' 10\" (1.778 m)   Wt 184 lb 3.2 oz (83.6 kg)   SpO2 97%   BMI 26.43 kg/m²       Sindy Hannon LPN

## 2019-04-29 NOTE — PROGRESS NOTES
[address] Note    Patient name: Disha Workman.   Patient : 1950  Patient MRN: [id number]  Date of service: 19    Primary care physician: Gregoria Faith MD  Primary cardiologist: Dr. Cruzito West:  Chronic systolic heart failure    PLAN:  Continue current medical therapy for heart failure  Continue current dose of toprol XL 50mg daily  Half the dose of entresto 97/103mg twice daily due to hypotension and worsening renal function  Cannot tolerate aldactone due to hyperkalemia; not interested to take valtessa  Continue current dose of diuretic, torsemide 40mg daily  Continue ASA, plavix and statin for CAD  Continue amiodarone for PAF, check thyroid profile every 3 months and PFT/DLCO every year   ICD interrogation every 3 months per routine, follow-up by Dr. Darcie Mtz  Consider scheduling annual echocardiogram and EKG at the visit in 2019  Check CBC and iron due to drop in hgb and iron sat  Labs: CBC, BMP, pro-NT-BNP, iron profile   Reinforced low salt diet  Reinforced fluid restriction to 6 x 8oz glasses per day  Discussed tobacco cessation and materials dispensed  Discussed abstinence from illicit drugs  Advanced care plan present on the chart  Counseled to obtain home scale and arm blood pressure cuff  Document in diary BP/HR before and 2 hours after taking medications and daily weights  Recommend flu vaccination annually and pneumonia vaccine every 5 years  Follow up with Endocrinologist, Dr. Sally Perez in  (on cancellation list)  Patient declines referral with vascular surgery for PAD  Follow-up with GI on h/o GI bleed and colorectal surgery, repeat EGD/colosocopy wants date to stop Plavix  Follow-up with primary cardiologist  Follow-up with EP cardiologist, Dr. Jaclyn Alfredo with PCP for diabetes management; patient declined Jardiance  Patient declined referral to pulmonary for COPD and sleep study  [facility] with NP/MD    IMPRESSION:  Orthostatic  Fatigue  Hypotension, drug induced  Chronic systolic heart failure  Stage C, NYHA class IIIA symptoms  Coronary artery disease   S/p impella assisted high risk PCI with MIKE to 100% p-RCA and 70% LM 2/9/17 by Dr. Sheridan Brunson  Ischemic cardiomyopathy, LVEF 25%  HTN  PAD not amenable to PTCA  Incomplete LBBB, QRS 108ms  CKD3  KELLY  COPD  S/p GI bleed and colorectal surgery  Medical noncompliance    CARDIAC IMAGING:  Echo (7/11/18) LVEF 25%, akinesis of basal-mid inferior wall. PA pressure 42mmHg. ICD interrogation (12/6/18) no events, normal device function, good batteries    HEMODYNAMICS:  RHC not done  CPEST not done  6MW not done    HISTORY OF PRESENT ILLNESS:  I had the pleasure of seeing Grady Miller in [address] [facility] in [place]. Briefly, Grady Miller is a 79 y.o. male with h/o CAD s/p PCI of LM,  of RCA, ischemic cardiomyopathy with LVEF 20%, s/p AICD complicated by  PAF (amiodarone), Hep C, GI bleed (s/p colorectal surgery), and remote tobacco abuse and PVD. INTERVAL HISTORY:  Today, patient presents for routine clinic visit. Patient is doing alright, but c/o chronic fatigue and having no energy. His BP runs very low on increased dose of entresto, but he did not have symptoms of presyncope or syncope but does have lightheadedness at times. His reported home SBP from 90's-130s, he did not not bring his home BP cuff with him today. Patient walked to our clinic from parking garage without having to slow down or stop. Patient can walk more than one block without symptoms of fatigue or shortness of breath. Patient can perform home activities without problem and routinely participates in daily walking for more than 15 minutes. Only time he is dyspneic while getting in the bed at night.       Patient denies symptoms of volume overload, abdominal bloating or leg edema. Patient's weight lower today when compared to his previous visit, denies change of appetite. Patient denies irregular heart rate or palpitations. ICD has not fired. Patient denies other cardiac symptoms such as chest pain. Positive for claudication. Patient is compliant with fluid restriction and taking medications as prescribed. Patient stopped taking his amiodarone and Vitamin D- \"too many medications at once\"    REVIEW OF SYSTEMS:  General: Denies fever, night sweats. Ear, nose and throat: Denies difficulty hearing, sinus problems, runny nose, post-nasal drip, ringing in ears, mouth sores, loose teeth, ear pain, nosebleeds, sore throate, facial pain or numbess  Cardiovascular: see above in the interval history  Respiratory: Denies cough, wheezing, sputum production, hemoptysis. Gastrointestinal: Denies heartburn, constipation, intolerance to certain foods, diarrhea, abdominal pain, nausea, vomiting, difficulty swallowing, blood in stool  Kidney and bladder: Denies painful urination, frequent urination, urgency, prostate problems and impotence  Musculoskeletal: Denies joint pain, muscle weakness, +claudication   Skin and hair: Denies change in existing skin lesions, hair loss or increase, breast changes    PHYSICAL EXAM:  Vital signs:   Visit Vitals  BP (!) 86/56 (BP 1 Location: Left arm, BP Patient Position: Sitting)   Pulse 88   Temp 96.8 °F (36 °C) (Oral)   Resp 16   Ht 5' 10\" (1.778 m)   Wt 184 lb 3.2 oz (83.6 kg)   SpO2 97%   BMI 26.43 kg/m²     General: Patient is well developed, well-nourished in no acute distress  HEENT: Normocephalic and atraumatic. No scleral icterus. Pupils are equal, round and reactive to light and accomodation. No conjunctival injection. Oropharynx is clear. Neck: Supple. No evidence of thyroid enlargements or lymphadenopathy. JVD: Cannot be appreciated   Lungs: Breath sounds are equal and clear, diminished in posterior bases.  No wheezes, rhonchi, or rales.  Heart: Regular rate and rhythm with normal S1 and S2. No murmurs, gallops or rubs. Abdomen: Soft, no mass or tenderness. No organomegaly or hernia. Bowel sounds present. Genitourinary and rectal: deferred  Extremities: No cyanosis, clubbing, or edema. Neurologic: No focal sensory or motor deficits are noted. Grossly intact. Psychiatric: Awake, alert an doriented x 3. Appropriate mood and affect. Skin: Warm, dry and well perfused. No lesions, nodules or rashes are noted. PAST MEDICAL HISTORY:  Past Medical History:   Diagnosis Date    Arthritis     hands/fingers    CAD (coronary artery disease)     involving native coronary artery of native heart without angina pectoris    Cardiomyopathy ([facility] [address] 01/20/2017    A. Echo (1/19/17):  EF 5-10% with severe GHK,. Mildly dil LA. Mild TR. PASP 46./systemic cardiomyopathy    Chronic kidney disease     Chronic obstructive pulmonary disease (HCC)     Chronic renal insufficiency 03/06/2017    Chronic systolic congestive heart failure, NYHA class 3 ([address] 1/8/2019    Claudication ([address] 10/9/2018    Congestive heart failure ([address]     Diabetes mellitus ([address] 3/6/2017    IDDM    Dyslipidemia 3/6/2017    A. FLP (1/19/17): Tot 120, , HDL 19, LDL 76 (no Rx).  H/O: GI bleed 3/6/2017    Hepatitis C 3/6/2017    Hypokalemia     ICD (implantable cardioverter-defibrillator) in place     Ill-defined condition     flu 1/2018     Neuropathy 11/19/2018    Noncompliance with medication regimen 9/11/2018    Paroxysmal atrial fibrillation ([address] 3/6/2017    Peripheral vascular disease ([address] 9/18/2017    A. RICKY (3/20/17): Right 0.58, Left 0.56.  Tobacco dependence syndrome 10/9/2018    Vitamin D deficiency 6/4/2018       PAST SURGICAL HISTORY:  Past Surgical History:   Procedure Laterality Date    COLONOSCOPY N/A 2/17/2017    COLONOSCOPY performed by Luigi Godinez.  Yanick Colmenares MD at [address] COLONOSCOPY N/A 2/5/2018    COLONOSCOPY performed by Evangelist Shaw MD at [facility] ENDOSCOPY    HX CORONARY STENT PLACEMENT      LM, RCA x 2     HX GI      colonoscopy x 3 - last 2017 - polyp    HX IMPLANTABLE CARDIOVERTER DEFIBRILLATOR         FAMILY HISTORY:  Family History   Problem Relation Age of Onset    Hypertension Mother     Heart Disease Mother         MURMUR    Cancer Father         COLON    Colon Cancer Father     Other Sister         born totally handicapped/epileptic    Kidney Disease Sister          from renal shutdown    Heart Disease Brother     Other Brother         ?pancreatic cancer or ?pancreatitis    Cancer Maternal Uncle         toes and spread    Cancer Paternal Uncle         stomach    Heart Attack Maternal Grandfather 47    Cancer Paternal Grandfather         bone    Cancer Maternal Uncle         stomach    Cancer Maternal Uncle         lung    Anesth Problems Neg Hx        SOCIAL HISTORY:  Social History     Socioeconomic History    Marital status:      Spouse name: Not on file    Number of children: Not on file    Years of education: Not on file    Highest education level: Not on file   Tobacco Use    Smoking status: Current Some Day Smoker     Packs/day: 0.50     Years: 45.00     Pack years: 22.50     Last attempt to quit: 2017     Years since quittin.2    Smokeless tobacco: Never Used    Tobacco comment: using Nicotine patches   Substance and Sexual Activity    Alcohol use: No    Drug use: Yes     Types: Marijuana    Sexual activity: Never     Partners: Female       LABORATORY RESULTS:     Labs Latest Ref Rng & Units 4/10/2019 2019 2019   WBC 3.4 - 10.8 x10E3/uL 5.4 - 6.0   RBC 4.14 - 5.80 x10E6/uL 4.87 - 4.25   Hemoglobin 13.0 - 17.7 g/dL 12. 3(L) - 10. 7(L)   Hematocrit 37.5 - 51.0 % 39.5 - 33. 1(L)   MCV 79 - 97 fL 81 - 78(L)   Platelets 314 - 802 /(L) - 215   Monocytes Not Estab. % - - 10   Eosinophils Not Estab. % - - 2   Basophils Not Estab. % - - 1   Albumin 3.6 - 4.8 g/dL 3.8 - 4.1   Calcium 8.6 - 10.2 mg/dL 8.8 8.9 8.7   SGOT 0 - 40 IU/L 12 - 16   Glucose 65 - 99 mg/dL 195(H) 146(H) 141(H)   BUN 8 - 27 mg/dL 35(H) 29(H) 25   Creatinine 0.76 - 1.27 mg/dL 1.51(H) 1.59(H) 1.42(H)   Sodium 134 - 144 mmol/L 143 141 142   Potassium 3.5 - 5.2 mmol/L 4.5 3.6 3.9   TSH 0.450 - 4.500 uIU/mL 0.049(L) - -   Some recent data might be hidden     Lab Results   Component Value Date/Time    TSH 0.049 (L) 04/10/2019 12:00 AM    TSH 1.050 12/06/2018 01:56 PM    TSH 3.040 12/04/2017 10:17 AM    TSH 2.04 01/22/2017 11:11 AM    TSH 1.49 01/18/2017 11:12 PM       CURRENT MEDICATIONS:    Current Outpatient Medications:     potassium chloride (K-DUR, KLOR-CON) 20 mEq tablet, Take 1 Tab by mouth three (3) times daily. , Disp: 60 Tab, Rfl: 3    torsemide (DEMADEX) 20 mg tablet, Take 2 Tabs by mouth daily. , Disp: 120 Tab, Rfl: 3    metoprolol succinate (TOPROL-XL) 100 mg tablet, Take 0.5 Tabs by mouth daily for 360 days. Indications: chronic heart failure, Disp: 90 Tab, Rfl: 1    atorvastatin (LIPITOR) 40 mg tablet, TAKE ONE TABLET BY MOUTH ONCE DAILY  Indications: excessive fat in the blood, Disp: 90 Tab, Rfl: 3    clopidogrel (PLAVIX) 75 mg tab, Take 1 Tab by mouth daily. , Disp: 90 Tab, Rfl: 3    insulin NPH/insulin regular (NOVOLIN 70/30 U-100 INSULIN) 100 unit/mL (70-30) injection, INJECT 10 UNITS SUBCUTANEOUSLY IN THE MORNING AND 8 UNIT IN THE EVENING, Disp: 5 Vial, Rfl: 5    aspirin 81 mg chewable tablet, Take 81 mg by mouth daily. , Disp: , Rfl:     insulin NPH/insulin regular (NOVOLIN 70/30) 100 unit/mL (70-30) injection, 10 Units by SubCUTAneous route Daily (before breakfast). , Disp: , Rfl:     insulin NPH/insulin regular (NOVOLIN 70/30) 100 unit/mL (70-30) injection, 8 Units by SubCUTAneous route Daily (before dinner). , Disp: , Rfl:     Insulin Syringes, Disposable, 1 mL syrg, Pt to take 10 units w/ breakfast and 8 units w/ dinner, Disp: 500 Syringe, Rfl: 1    acetaminophen (TYLENOL EXTRA STRENGTH) 500 mg tablet, Take 1,000 mg by mouth every six (6) hours as needed for Pain. Indications: BACK PAIN, Disp: , Rfl:       Thank you for your referral and allowing me to participate in this patient's care. Sonny Head NP  [address] [place] [address], [address]  Phone: [phone]  Fax: [phone]    [facility] ATTENDING ADDENDUM    Patient was seen and examined in person. Data and notes were reviewed. I have discussed and agree with the plan as noted in the NP note above without further additions.     Hortensia Cui MD PhD  Brayden Crowder

## 2019-04-30 ENCOUNTER — TELEPHONE (OUTPATIENT)
Dept: CARDIOLOGY CLINIC | Age: 69
End: 2019-04-30

## 2019-04-30 DIAGNOSIS — I50.22 CHRONIC SYSTOLIC CONGESTIVE HEART FAILURE, NYHA CLASS 3 (HCC): Primary | ICD-10-CM

## 2019-04-30 DIAGNOSIS — R06.09 DOE (DYSPNEA ON EXERTION): ICD-10-CM

## 2019-04-30 LAB
BUN SERPL-MCNC: 60 MG/DL (ref 8–27)
BUN/CREAT SERPL: 29 (ref 10–24)
CALCIUM SERPL-MCNC: 9.6 MG/DL (ref 8.6–10.2)
CHLORIDE SERPL-SCNC: 98 MMOL/L (ref 96–106)
CO2 SERPL-SCNC: 23 MMOL/L (ref 20–29)
CREAT SERPL-MCNC: 2.09 MG/DL (ref 0.76–1.27)
ERYTHROCYTE [DISTWIDTH] IN BLOOD BY AUTOMATED COUNT: 16.1 % (ref 12.3–15.4)
GLUCOSE SERPL-MCNC: 183 MG/DL (ref 65–99)
HCT VFR BLD AUTO: 40.4 % (ref 37.5–51)
HGB BLD-MCNC: 12.9 G/DL (ref 13–17.7)
IRON SATN MFR SERPL: 28 % (ref 15–55)
IRON SERPL-MCNC: 69 UG/DL (ref 38–169)
MCH RBC QN AUTO: 25.3 PG (ref 26.6–33)
MCHC RBC AUTO-ENTMCNC: 31.9 G/DL (ref 31.5–35.7)
MCV RBC AUTO: 79 FL (ref 79–97)
NT-PROBNP SERPL-MCNC: 2777 PG/ML (ref 0–376)
PLATELET # BLD AUTO: 119 X10E3/UL (ref 150–379)
POTASSIUM SERPL-SCNC: 4.7 MMOL/L (ref 3.5–5.2)
RBC # BLD AUTO: 5.1 X10E6/UL (ref 4.14–5.8)
SODIUM SERPL-SCNC: 138 MMOL/L (ref 134–144)
TIBC SERPL-MCNC: 246 UG/DL (ref 250–450)
UIBC SERPL-MCNC: 177 UG/DL (ref 111–343)
WBC # BLD AUTO: 6.2 X10E3/UL (ref 3.4–10.8)

## 2019-04-30 RX ORDER — DIPHENHYDRAMINE HCL 25 MG
25 CAPSULE ORAL ONCE
Status: COMPLETED | OUTPATIENT
Start: 2019-05-03 | End: 2019-05-03

## 2019-04-30 RX ORDER — CLONIDINE HYDROCHLORIDE 0.1 MG/1
0.1 TABLET ORAL
Status: DISCONTINUED | OUTPATIENT
Start: 2019-05-03 | End: 2019-05-07 | Stop reason: HOSPADM

## 2019-04-30 RX ORDER — ACETAMINOPHEN 325 MG/1
650 TABLET ORAL ONCE
Status: COMPLETED | OUTPATIENT
Start: 2019-05-03 | End: 2019-05-03

## 2019-04-30 NOTE — TELEPHONE ENCOUNTER
I called patient and advised him per JING Santana:  Labs stable elevated Cr but decreased torsemide and entresto yesterday, continue current plan.  Please have labs rechecked in 2 weeks.  BMP, NTproBNP, and CBC. He states understanding, has no questions.

## 2019-05-03 ENCOUNTER — HOSPITAL ENCOUNTER (OUTPATIENT)
Dept: INFUSION THERAPY | Age: 69
Discharge: HOME | End: 2019-05-03
Payer: MEDICARE

## 2019-05-03 VITALS
SYSTOLIC BLOOD PRESSURE: 124 MMHG | TEMPERATURE: 97.7 F | RESPIRATION RATE: 16 BRPM | DIASTOLIC BLOOD PRESSURE: 72 MMHG | OXYGEN SATURATION: 98 % | HEART RATE: 78 BPM

## 2019-05-03 PROCEDURE — 74011250637 HC RX REV CODE- 250/637: Performed by: INTERNAL MEDICINE

## 2019-05-03 PROCEDURE — 74011250636 HC RX REV CODE- 250/636: Performed by: INTERNAL MEDICINE

## 2019-05-03 PROCEDURE — 96374 THER/PROPH/DIAG INJ IV PUSH: CPT

## 2019-05-03 PROCEDURE — 74011000258 HC RX REV CODE- 258: Performed by: INTERNAL MEDICINE

## 2019-05-03 RX ORDER — TORSEMIDE 20 MG/1
20 TABLET ORAL DAILY
Qty: 180 TAB | Refills: 1 | Status: SHIPPED | OUTPATIENT
Start: 2019-05-03 | End: 2019-05-14

## 2019-05-03 RX ADMIN — ACETAMINOPHEN 650 MG: 325 TABLET ORAL at 09:46

## 2019-05-03 RX ADMIN — DIPHENHYDRAMINE HYDROCHLORIDE 25 MG: 25 CAPSULE ORAL at 09:45

## 2019-05-03 RX ADMIN — FERUMOXYTOL 510 MG: 510 INJECTION INTRAVENOUS at 10:31

## 2019-05-03 NOTE — PROGRESS NOTES
PEDI [facility]MO VISIT NOTE 
   
0930 Patient arrives for [facility] 1/2 without acute problems. Please see connect care for complete assessment and education provided.  
  
24 gauge PIV placed to left wrist; + blood return noted. 
   
Vitals Signs: 
Patient Vitals for the past 12 hrs: 
 Temp Pulse Resp BP SpO2  
05/03/19 1130 97.7 °F (36.5 °C) 78 16 124/72 98 % 05/03/19 1128 97.8 °F (36.6 °C) 70 16 129/72   
05/03/19 1051 97.5 °F (36.4 °C) 67 16 118/69   
05/03/19 0939 97.8 °F (36.6 °C) 73 16 127/78 96 %  
 
 
  Medications: 
Verified by Jane Duke RN via [employer] 1. Tylenol PO 
2. Benadryl PO 
3. Feraheme IVPB 
  
Patient's PIV was flushed; removed and bandage placed over site. 1130 Vital signs stable throughout and prior to discharge, Patient tolerated treatment well and discharged without incident.  Patient/parent is aware of next [facility] appointment on 5/10/2019 at 10:00am.

## 2019-05-06 RX ORDER — ACETAMINOPHEN 325 MG/1
650 TABLET ORAL ONCE
Status: COMPLETED | OUTPATIENT
Start: 2019-05-10 | End: 2019-05-10

## 2019-05-06 RX ORDER — CLONIDINE HYDROCHLORIDE 0.1 MG/1
0.1 TABLET ORAL
Status: ACTIVE | OUTPATIENT
Start: 2019-05-10 | End: 2019-05-10

## 2019-05-06 RX ORDER — DIPHENHYDRAMINE HCL 25 MG
25 CAPSULE ORAL ONCE
Status: COMPLETED | OUTPATIENT
Start: 2019-05-10 | End: 2019-05-10

## 2019-05-10 ENCOUNTER — HOSPITAL ENCOUNTER (OUTPATIENT)
Dept: INFUSION THERAPY | Age: 69
Discharge: HOME | End: 2019-05-10
Payer: MEDICARE

## 2019-05-10 VITALS
SYSTOLIC BLOOD PRESSURE: 118 MMHG | DIASTOLIC BLOOD PRESSURE: 72 MMHG | TEMPERATURE: 97.9 F | RESPIRATION RATE: 16 BRPM | OXYGEN SATURATION: 99 % | HEART RATE: 72 BPM

## 2019-05-10 PROCEDURE — 74011250637 HC RX REV CODE- 250/637: Performed by: INTERNAL MEDICINE

## 2019-05-10 PROCEDURE — 74011000258 HC RX REV CODE- 258: Performed by: INTERNAL MEDICINE

## 2019-05-10 PROCEDURE — 74011250636 HC RX REV CODE- 250/636: Performed by: INTERNAL MEDICINE

## 2019-05-10 PROCEDURE — 96374 THER/PROPH/DIAG INJ IV PUSH: CPT

## 2019-05-10 RX ADMIN — DIPHENHYDRAMINE HYDROCHLORIDE 25 MG: 25 CAPSULE ORAL at 10:46

## 2019-05-10 RX ADMIN — FERUMOXYTOL 510 MG: 510 INJECTION INTRAVENOUS at 11:40

## 2019-05-10 RX ADMIN — ACETAMINOPHEN 650 MG: 325 TABLET ORAL at 10:46

## 2019-05-10 NOTE — PROGRESS NOTES
PEDI [facility] VISIT NOTE 
   
1040 Patient arrives for [facility] 2/2 without acute problems. Please see connect [facility] for complete assessment and education provided.  
  
24 gauge PIV placed to left wrist; + blood return noted. 
   
Vitals Signs: 
Patient Vitals for the past 12 hrs: 
 Temp Pulse Resp BP SpO2  
05/10/19 1220 97.9 °F (36.6 °C) 72 16 118/72   
05/10/19 1041 97.8 °F (36.6 °C) 75  114/59 99 %  
 
 
  Medications: 
Verified by Jane Duke RN via [employer] 1. Tylenol PO 
2. Benadryl PO 
3. Feraheme IVPB 
  
Patient's PIV was flushed; removed and bandage placed over site. Patient was monitored for 30 minutes post infusion. 1130 Vital signs stable throughout and prior to discharge, Patient tolerated treatment well and discharged without incident. Patient/parent is aware of next follow up appointment with physician.

## 2019-05-14 ENCOUNTER — OFFICE VISIT (OUTPATIENT)
Dept: CARDIOLOGY CLINIC | Age: 69
End: 2019-05-14

## 2019-05-14 VITALS
HEIGHT: 70 IN | TEMPERATURE: 97.9 F | DIASTOLIC BLOOD PRESSURE: 80 MMHG | HEART RATE: 66 BPM | WEIGHT: 184 LBS | OXYGEN SATURATION: 97 % | SYSTOLIC BLOOD PRESSURE: 146 MMHG | RESPIRATION RATE: 16 BRPM | BODY MASS INDEX: 26.34 KG/M2

## 2019-05-14 DIAGNOSIS — G62.9 NEUROPATHY: ICD-10-CM

## 2019-05-14 DIAGNOSIS — I50.22 CHRONIC SYSTOLIC CONGESTIVE HEART FAILURE, NYHA CLASS 3 (HCC): ICD-10-CM

## 2019-05-14 DIAGNOSIS — Z95.810 ICD (IMPLANTABLE CARDIOVERTER-DEFIBRILLATOR) IN PLACE: ICD-10-CM

## 2019-05-14 DIAGNOSIS — I73.9 PERIPHERAL VASCULAR DISEASE (HCC): ICD-10-CM

## 2019-05-14 DIAGNOSIS — I73.9 CLAUDICATION (HCC): ICD-10-CM

## 2019-05-14 DIAGNOSIS — E11.21 TYPE 2 DIABETES MELLITUS WITH NEPHROPATHY (HCC): ICD-10-CM

## 2019-05-14 DIAGNOSIS — Z87.19 H/O: GI BLEED: ICD-10-CM

## 2019-05-14 DIAGNOSIS — E78.5 DYSLIPIDEMIA: ICD-10-CM

## 2019-05-14 DIAGNOSIS — I25.10 CORONARY ARTERY DISEASE INVOLVING NATIVE CORONARY ARTERY OF NATIVE HEART WITHOUT ANGINA PECTORIS: ICD-10-CM

## 2019-05-14 DIAGNOSIS — I25.5 ISCHEMIC CARDIOMYOPATHY: Primary | ICD-10-CM

## 2019-05-14 DIAGNOSIS — I48.0 PAROXYSMAL ATRIAL FIBRILLATION (HCC): ICD-10-CM

## 2019-05-14 DIAGNOSIS — E56.9 VITAMIN DEFICIENCY: ICD-10-CM

## 2019-05-14 DIAGNOSIS — N18.30 CHRONIC RENAL IMPAIRMENT, STAGE 3 (MODERATE) (HCC): ICD-10-CM

## 2019-05-14 DIAGNOSIS — B18.2 CHRONIC HEPATITIS C WITHOUT HEPATIC COMA (HCC): ICD-10-CM

## 2019-05-14 DIAGNOSIS — I50.20 SYSTOLIC HEART FAILURE, ACC/AHA STAGE C (HCC): ICD-10-CM

## 2019-05-14 DIAGNOSIS — R06.09 DOE (DYSPNEA ON EXERTION): ICD-10-CM

## 2019-05-14 RX ORDER — BUPROPION HYDROCHLORIDE 300 MG/1
300 TABLET ORAL
Qty: 30 TAB | Refills: 3 | Status: SHIPPED | OUTPATIENT
Start: 2019-05-14 | End: 2019-05-22 | Stop reason: SDUPTHER

## 2019-05-14 RX ORDER — TORSEMIDE 20 MG/1
20 TABLET ORAL AS NEEDED
Qty: 180 TAB | Refills: 1 | Status: SHIPPED | OUTPATIENT
Start: 2019-05-14 | End: 2019-07-01

## 2019-05-14 RX ORDER — TORSEMIDE 20 MG/1
20 TABLET ORAL AS NEEDED
Qty: 180 TAB | Refills: 1 | Status: SHIPPED | OUTPATIENT
Start: 2019-05-14 | End: 2019-05-14 | Stop reason: SDUPTHER

## 2019-05-14 NOTE — TELEPHONE ENCOUNTER
Spoke with [facility] Supervisor,    MONICA Torres LPN                     Will you please call his [employer] RN ([phone]) and ask them to draw a CMP and Mg tomorrow? Maggy Cee! [employer] supervisor will have [address] go out and Draw today.
Mrs. Das

## 2019-05-14 NOTE — PROGRESS NOTES
[facility], [address] [place] [address] Note    Patient name: Lakesha Mackey.   Patient : 1950  Patient MRN: [id number]  Date of service: 19    Primary care physician: Ayan Gamino MD  Primary cardiologist: Dr. Pilar Aguilar:  Chronic systolic heart failure    PLAN:  Continue current medical therapy for heart failure  Continue current dose of toprol XL 50mg daily  Increase entresto 49/51mg twice daily   Cannot tolerate aldactone due to hyperkalemia; not interested to take veltassa  Decrease torsemide 20mg prn, increasing Entresto  Increase Wellbutrin XL to 300mg daily to assist with smoking cessation  Continue ASA, plavix and statin for CAD  Continue amiodarone for PAF, check thyroid profile every 3 months and PFT/DLCO every year   ICD interrogation every 3 months per routine, follow-up by Dr. Max Blood annual echocardiogram and EKG in 2019  Labs today: CBC, BMP, pro-NT-BNP  Reinforced low salt diet  Reinforced fluid restriction to 6 x 8oz glasses per day  Discussed tobacco cessation and materials dispensed  Advanced care plan present on the chart  Counseled to obtain home scale and arm blood pressure cuff  Document in diary BP/HR before and 2 hours after taking medications and daily weights  Recommend flu vaccination annually and pneumonia vaccine every 5 years  Follow up with Endocrinologist, Dr. Flaco Kendrick in  (on cancellation list)  Patient declines referral with vascular surgery for PAD  Follow-up with GI on h/o GI bleed and colorectal surgery, repeat EGD/colosocopy wants date to stop Plavix  Follow-up with primary cardiologist, Dr. Holly Zeng  Follow-up with EP cardiologist, Dr. Madelyn Montoya  Follow-up with PCP, Dr. Shorty Red. Valentina Love for congested cough and wheezing and diabetes management; patient declined Jardiance  Patient declined referral to pulmonary for COPD and sleep study  Return to [facility] in 4 weeks with NP/MD    IMPRESSION:  Chronic systolic heart failure  Stage C, NYHA class II symptoms  Coronary artery disease   S/p impella assisted high risk PCI with MIKE to 100% p-RCA and 70% LM 2/9/17 by Dr. Linwood Paulino  Ischemic cardiomyopathy, LVEF 25%  HTN  PAD not amenable to PTCA  Incomplete LBBB  CKD3  KELLY  COPD  S/p GI bleed and colorectal surgery  Medical noncompliance  Claudication  PAD  Hepatitis C    CARDIAC IMAGING:  Echo (7/11/18) LVEF 25%, akinesis of basal-mid inferior wall. PA pressure 42mmHg. ICD interrogation (12/6/18) no events, normal device function, good batteries    HEMODYNAMICS:  RHC not done  CPEST not done  6MW not done    HISTORY OF PRESENT ILLNESS:  I had the pleasure of seeing Disha Cristobal in [address] [place] [address] at [facility] in [place]. Briefly, Disha Cristobal is a 79 y.o. male with h/o CAD s/p PCI of LM,  of RCA, ischemic cardiomyopathy with LVEF 91%, s/p AICD complicated by  PAF (amiodarone), Hep C, GI bleed (s/p colorectal surgery), and remote tobacco abuse and PAD. INTERVAL HISTORY:  Today, patient presents for routine clinic visit. Patient is doing alright, but c/o congested cough with grey liquid sputum. Still smoking 3/54 ppd cigarettes. His BP is elevated today 146/80, he has been taking 100mg toprol xl the past 3 days rather than splitting into 50 mg because he forgot to split the pill in half. HR 60's. Denies symptoms of presyncope or syncope, or lightheadedness. He reprots not taking home BPs, he did not not bring his home BP cuff with him today. Patient walked to our clinic from parking garage without having to slow down or stop. Patient can walk more than one block without symptoms of fatigue or shortness of breath. Patient can perform home activities without problem and routinely participates in daily walking for more than 15 minutes. Only time he is dyspneic while getting in the bed at night.       Patient denies symptoms of volume overload, abdominal bloating or leg edema. Patient's weight lower today when compared to his previous visit, denies change of appetite. Patient denies irregular heart rate or palpitations. ICD has not fired. Patient denies other cardiac symptoms such as chest pain. Positive for claudication. Patient is compliant with fluid restriction and taking medications as prescribed. REVIEW OF SYSTEMS:  General: Denies fever, night sweats. Ear, nose and throat: Denies difficulty hearing, sinus problems, runny nose, post-nasal drip, ringing in ears, mouth sores, loose teeth, ear pain, nosebleeds, sore throate, facial pain or numbess  Cardiovascular: see above in the interval history  Respiratory: Positive for cough, wheezing, and sputum production, Denies hemoptysis. Gastrointestinal: Denies heartburn, constipation, intolerance to certain foods, diarrhea, abdominal pain, nausea, vomiting, difficulty swallowing, blood in stool  Kidney and bladder: Denies painful urination, frequent urination, urgency, prostate problems and impotence  Musculoskeletal: Denies joint pain, muscle weakness, +claudication   Skin and hair: Denies change in existing skin lesions, hair loss or increase, breast changes    PHYSICAL EXAM:  Vital signs:   Visit Vitals  /80 (BP 1 Location: Right arm, BP Patient Position: Sitting)   Pulse 66   Temp 97.9 °F (36.6 °C) (Oral)   Resp 16   Ht 5' 10\" (1.778 m)   Wt 184 lb (83.5 kg)   SpO2 97%   BMI 26.40 kg/m²     General: Patient is well developed, well-nourished in no acute distress  HEENT: Normocephalic and atraumatic. No scleral icterus. Pupils are equal, round and reactive to light and accomodation. No conjunctival injection. Oropharynx is clear. Neck: Supple. No evidence of thyroid enlargements or lymphadenopathy.   JVD: Cannot be appreciated   Lungs: Breath sounds are diminished in posterior bases with wheezes in upper posterior lobes, congested cough, no rhonchi, or rales.  Heart: Regular rate and rhythm with normal S1 and S2. No murmurs, gallops or rubs. Abdomen: Soft, no mass or tenderness. No organomegaly or hernia. Bowel sounds present. Genitourinary and rectal: deferred  Extremities: No cyanosis, clubbing, or edema. Neurologic: No focal sensory or motor deficits are noted. Grossly intact. Psychiatric: Awake, alert an doriented x 3. Appropriate mood and affect. Skin: Warm, dry and well perfused, flaky BLE . No lesions, nodules or rashes are noted. PAST MEDICAL HISTORY:  Past Medical History:   Diagnosis Date    Arthritis     hands/fingers    CAD (coronary artery disease)     involving native coronary artery of native heart without angina pectoris    Cardiomyopathy ([facility] 01/20/2017    A. Echo (1/19/17):  EF 5-10% with severe GHK,. Mildly dil LA. Mild TR. PASP 46./systemic cardiomyopathy    Chronic kidney disease     Chronic obstructive pulmonary disease (HCC)     Chronic renal insufficiency 03/06/2017    Chronic systolic congestive heart failure, NYHA class 3 ([address] 1/8/2019    Claudication ([address] 10/9/2018    Congestive heart failure ([address]     Diabetes mellitus ([address] 3/6/2017    IDDM    Dyslipidemia 3/6/2017    A. FLP (1/19/17): Tot 120, , HDL 19, LDL 76 (no Rx).  H/O: GI bleed 3/6/2017    Hepatitis C 3/6/2017    Hypokalemia     ICD (implantable cardioverter-defibrillator) in place     Ill-defined condition     flu 1/2018     Neuropathy 11/19/2018    Noncompliance with medication regimen 9/11/2018    Paroxysmal atrial fibrillation ([address] 3/6/2017    Peripheral vascular disease ([address] 9/18/2017    A. RICKY (3/20/17): Right 0.58, Left 0.56.  Tobacco dependence syndrome 10/9/2018    Vitamin D deficiency 6/4/2018       PAST SURGICAL HISTORY:  Past Surgical History:   Procedure Laterality Date    COLONOSCOPY N/A 2/17/2017    COLONOSCOPY performed by Hammad Collazo.  Margarita Bruno MD at [address] COLONOSCOPY N/A 2/5/2018    COLONOSCOPY performed by Rosalba Harmon. Juanis Vergara MD at [facility] ENDOSCOPY    HX CORONARY STENT PLACEMENT      LM, RCA x 2     HX GI      colonoscopy x 3 - last 2017 - polyp    HX IMPLANTABLE CARDIOVERTER DEFIBRILLATOR         FAMILY HISTORY:  Family History   Problem Relation Age of Onset    Hypertension Mother     Heart Disease Mother         MURMUR    Cancer Father         COLON    Colon Cancer Father     Other Sister         born totally handicapped/epileptic    Kidney Disease Sister          from renal shutdown    Heart Disease Brother     Other Brother         ?pancreatic cancer or ?pancreatitis    Cancer Maternal Uncle         toes and spread    Cancer Paternal Uncle         stomach    Heart Attack Maternal Grandfather 47    Cancer Paternal Grandfather         bone    Cancer Maternal Uncle         stomach    Cancer Maternal Uncle         lung    Anesth Problems Neg Hx        SOCIAL HISTORY:  Social History     Socioeconomic History    Marital status:      Spouse name: Not on file    Number of children: Not on file    Years of education: Not on file    Highest education level: Not on file   Tobacco Use    Smoking status: Current Some Day Smoker     Packs/day: 0.50     Years: 45.00     Pack years: 22.50     Last attempt to quit: 2017     Years since quittin.3    Smokeless tobacco: Never Used    Tobacco comment: using Nicotine patches   Substance and Sexual Activity    Alcohol use: No    Drug use: Yes     Types: Marijuana    Sexual activity: Never     Partners: Female       LABORATORY RESULTS:     Labs Latest Ref Rng & Units 2019 4/10/2019 2019 2019   WBC 3.4 - 10.8 x10E3/uL 6.2 5.4 - 6.0   RBC 4.14 - 5.80 x10E6/uL 5.10 4.87 - 4.25   Hemoglobin 13.0 - 17.7 g/dL 12. 9(L) 12. 3(L) - 10. 7(L)   Hematocrit 37.5 - 51.0 % 40.4 39.5 - 33. 1(L)   MCV 79 - 97 fL 79 81 - 78(L)   Platelets 641 - 129 V48H4/(L) 110(L) - 215   Monocytes Not Estab. % - - - 10   Eosinophils Not Estab. % - - - 2   Basophils Not Estab. % - - - 1   Albumin 3.6 - 4.8 g/dL - 3.8 - 4.1   Calcium 8.6 - 10.2 mg/dL 9.6 8.8 8.9 8.7   SGOT 0 - 40 IU/L - 12 - 16   Glucose 65 - 99 mg/dL 183(H) 195(H) 146(H) 141(H)   BUN 8 - 27 mg/dL 60(H) 35(H) 29(H) 25   Creatinine 0.76 - 1.27 mg/dL 2.09(H) 1.51(H) 1.59(H) 1.42(H)   Sodium 134 - 144 mmol/L 138 143 141 142   Potassium 3.5 - 5.2 mmol/L 4.7 4.5 3.6 3.9   TSH 0.450 - 4.500 uIU/mL - 0.049(L) - -   Some recent data might be hidden     Lab Results   Component Value Date/Time    TSH 0.049 (L) 04/10/2019 12:00 AM    TSH 1.050 12/06/2018 01:56 PM    TSH 3.040 12/04/2017 10:17 AM    TSH 2.04 01/22/2017 11:11 AM    TSH 1.49 01/18/2017 11:12 PM       CURRENT MEDICATIONS:    Current Outpatient Medications:     torsemide (DEMADEX) 20 mg tablet, Take 1 Tab by mouth daily. May take an extra tablet for weight gain or shortness of breath, Disp: 180 Tab, Rfl: 1    buPROPion XL (WELLBUTRIN XL) 150 mg tablet, Take 1 Tab by mouth every morning., Disp: 30 Tab, Rfl: 0    sacubitril-valsartan (ENTRESTO) 24 mg/26 mg tablet, Take 1 Tab by mouth two (2) times a day., Disp: 60 Tab, Rfl: 1    cholecalciferol (VITAMIN D3) 5,000 unit capsule, Take 1 Cap by mouth daily. , Disp: 30 Cap, Rfl: 2    amiodarone (CORDARONE) 200 mg tablet, Take 1 Tab by mouth two (2) times a day., Disp: 60 Tab, Rfl: 0    potassium chloride (K-DUR, KLOR-CON) 20 mEq tablet, Take 1 Tab by mouth three (3) times daily. , Disp: 60 Tab, Rfl: 3    metoprolol succinate (TOPROL-XL) 100 mg tablet, Take 0.5 Tabs by mouth daily for 360 days. Indications: chronic heart failure (Patient taking differently: Take 100 mg by mouth daily. Indications: chronic heart failure), Disp: 90 Tab, Rfl: 1    atorvastatin (LIPITOR) 40 mg tablet, TAKE ONE TABLET BY MOUTH ONCE DAILY  Indications: excessive fat in the blood, Disp: 90 Tab, Rfl: 3    clopidogrel (PLAVIX) 75 mg tab, Take 1 Tab by mouth daily. , Disp: 90 Tab, Rfl: 3    insulin NPH/insulin regular (NOVOLIN 70/30 U-100 INSULIN) 100 unit/mL (70-30) injection, INJECT 10 UNITS SUBCUTANEOUSLY IN THE MORNING AND 8 UNIT IN THE EVENING, Disp: 5 Vial, Rfl: 5    aspirin 81 mg chewable tablet, Take 81 mg by mouth daily. , Disp: , Rfl:     Insulin Syringes, Disposable, 1 mL syrg, Pt to take 10 units w/ breakfast and 8 units w/ dinner, Disp: 500 Syringe, Rfl: 1    acetaminophen (TYLENOL EXTRA STRENGTH) 500 mg tablet, Take 1,000 mg by mouth every six (6) hours as needed for Pain. Indications: BACK PAIN, Disp: , Rfl:       Thank you for your referral and allowing me to participate in this patient's care. Ema Simmons NP  [address] [place] [address], [place] [facility]  Phone: [phone]  Fax: [phone]      Patient seen and examined. Data and note reviewed. I have discussed and agree with the plans as noted. 79year old with a history of ICM and HFrEF who presents with persistent edema. Agree with increase of ARNI dose and use torsemide prn. Asked patient to track his daily weights and BPs at home as well as bring those readings to his next clinic visit. Will check renal function with labs today. Thank you for allowing us to participate in your patient's care. Dorie Stuart MD, [facility], [address] [place] [address]  Chief of Cardiology, [address] [place] [address] Director  [address] [place] [address]  [facility], [address] [place] [address], [address] [place] [address]  Office [phone]  Fax [phone]

## 2019-05-14 NOTE — PATIENT INSTRUCTIONS
INCREASE Wellbutrin XL to 300 daily, try taking at night to help with sleep    DECREASE torsemide to as needed, if taking daily please call    Continue taking toprol XL 50mg     CONTINUE CURRENT medications    Take twice a day medications 12 hours apart with food    Labs today     See GI for date of colonoscopy for date to stop plavix and follow up with PCP for cough/inhaler    Limit sodium to 2 grams daily and fluid intake to 6x 8 oz daily    Keep a positive attitude     Try to decrease cigarette smoking  Follow up in 4 weeks     HF Education: Continue daily weights (in the morning, after voiding). Notify HF team of overnight weight gains > 2 lbs or weekly >5 lbs or if any of the following Sx. Continue to limit sodium intake & monitor your fluid intake .       Please take BP in am and 2 hrs after medications

## 2019-05-15 ENCOUNTER — DOCUMENTATION ONLY (OUTPATIENT)
Dept: CARDIOLOGY CLINIC | Age: 69
End: 2019-05-15

## 2019-05-15 LAB
BUN SERPL-MCNC: 38 MG/DL (ref 8–27)
BUN/CREAT SERPL: 19 (ref 10–24)
CALCIUM SERPL-MCNC: 10 MG/DL (ref 8.6–10.2)
CHLORIDE SERPL-SCNC: 100 MMOL/L (ref 96–106)
CO2 SERPL-SCNC: 20 MMOL/L (ref 20–29)
CREAT SERPL-MCNC: 2.03 MG/DL (ref 0.76–1.27)
ERYTHROCYTE [DISTWIDTH] IN BLOOD BY AUTOMATED COUNT: 17.2 % (ref 12.3–15.4)
GLUCOSE SERPL-MCNC: 150 MG/DL (ref 65–99)
HCT VFR BLD AUTO: 42.3 % (ref 37.5–51)
HGB BLD-MCNC: 13.3 G/DL (ref 13–17.7)
MCH RBC QN AUTO: 25 PG (ref 26.6–33)
MCHC RBC AUTO-ENTMCNC: 31.4 G/DL (ref 31.5–35.7)
MCV RBC AUTO: 79 FL (ref 79–97)
NT-PROBNP SERPL-MCNC: 2881 PG/ML (ref 0–376)
PLATELET # BLD AUTO: 154 X10E3/UL (ref 150–379)
POTASSIUM SERPL-SCNC: 4.8 MMOL/L (ref 3.5–5.2)
RBC # BLD AUTO: 5.33 X10E6/UL (ref 4.14–5.8)
SODIUM SERPL-SCNC: 140 MMOL/L (ref 134–144)
WBC # BLD AUTO: 8.4 X10E3/UL (ref 3.4–10.8)

## 2019-05-15 NOTE — PROGRESS NOTES
Creatinine 2.03 and NTproBNP 2881, will continue to monitor after recent changes with torsemide and entresto  Continue Current plan for now.     Thank you

## 2019-05-22 ENCOUNTER — HOSPITAL ENCOUNTER (OUTPATIENT)
Dept: LAB | Age: 69
Discharge: HOME | End: 2019-05-22
Payer: MEDICARE

## 2019-05-22 ENCOUNTER — TELEPHONE (OUTPATIENT)
Dept: FAMILY MEDICINE CLINIC | Age: 69
End: 2019-05-22

## 2019-05-22 ENCOUNTER — OFFICE VISIT (OUTPATIENT)
Dept: FAMILY MEDICINE CLINIC | Age: 69
End: 2019-05-22

## 2019-05-22 VITALS
DIASTOLIC BLOOD PRESSURE: 71 MMHG | RESPIRATION RATE: 22 BRPM | HEART RATE: 60 BPM | OXYGEN SATURATION: 97 % | BODY MASS INDEX: 26.63 KG/M2 | SYSTOLIC BLOOD PRESSURE: 129 MMHG | TEMPERATURE: 97.9 F | WEIGHT: 186 LBS | HEIGHT: 70 IN

## 2019-05-22 DIAGNOSIS — Z71.89 ADVANCE CARE PLANNING: ICD-10-CM

## 2019-05-22 DIAGNOSIS — E11.40 TYPE 2 DIABETES MELLITUS WITH DIABETIC NEUROPATHY, WITHOUT LONG-TERM CURRENT USE OF INSULIN (HCC): ICD-10-CM

## 2019-05-22 DIAGNOSIS — I48.0 PAROXYSMAL ATRIAL FIBRILLATION (HCC): ICD-10-CM

## 2019-05-22 DIAGNOSIS — J43.9 PULMONARY EMPHYSEMA, UNSPECIFIED EMPHYSEMA TYPE (HCC): ICD-10-CM

## 2019-05-22 DIAGNOSIS — R80.9 PROTEINURIA, UNSPECIFIED TYPE: ICD-10-CM

## 2019-05-22 DIAGNOSIS — I50.20 SYSTOLIC HEART FAILURE, ACC/AHA STAGE C (HCC): ICD-10-CM

## 2019-05-22 DIAGNOSIS — I73.9 PERIPHERAL VASCULAR DISEASE (HCC): ICD-10-CM

## 2019-05-22 DIAGNOSIS — R06.09 DOE (DYSPNEA ON EXERTION): ICD-10-CM

## 2019-05-22 DIAGNOSIS — Z00.00 ROUTINE GENERAL MEDICAL EXAMINATION AT A HEALTH CARE FACILITY: Primary | ICD-10-CM

## 2019-05-22 PROCEDURE — 80061 LIPID PANEL: CPT

## 2019-05-22 PROCEDURE — 80053 COMPREHEN METABOLIC PANEL: CPT

## 2019-05-22 PROCEDURE — 83036 HEMOGLOBIN GLYCOSYLATED A1C: CPT

## 2019-05-22 RX ORDER — BUPROPION HYDROCHLORIDE 150 MG/1
150 TABLET ORAL
Qty: 30 TAB | Refills: 0 | Status: SHIPPED | OUTPATIENT
Start: 2019-05-22 | End: 2019-06-26 | Stop reason: ALTCHOICE

## 2019-05-22 RX ORDER — BUPROPION HYDROCHLORIDE 150 MG/1
150 TABLET ORAL
Qty: 30 TAB | Refills: 0 | Status: SHIPPED | OUTPATIENT
Start: 2019-05-22 | End: 2019-05-22 | Stop reason: SDUPTHER

## 2019-05-22 NOTE — PROGRESS NOTES
Patient ehre for copd f/u. Not fasting today. 1. Have you been to the ER, urgent care clinic since your last visit? Hospitalized since your last visit? No 
 
2. Have you seen or consulted any other health care providers outside of the [address] [place] [address] since your last visit? Include any pap smears or colon screening. No  
 
 
Medicare Wellness Exam: Chief Complaint Patient presents with  COPD  
 
he is a 79y.o. year old male who presents for evaluation for their Medicare Wellness Visit. Fall Screen is completed and assessed=yes Depression Screen is completed and assessed=yes Medication list reviewed and adjusted for accuracy=yes Immunizations reviewed and updated=yes Health/Preventative Screenings reviewed and updated=yes ADL Functions reviewed=yes Patient Active Problem List  
 Diagnosis  Vitamin deficiency  Chronic systolic congestive heart failure, NYHA class 3 ([facility]  Type 2 diabetes mellitus with diabetic neuropathy (HCC)  Neuropathy  Claudication ([facility]  Tobacco dependence syndrome  Noncompliance with medication regimen  Proteinuria  Vitamin D deficiency  Polyp of colon  Advance care planning  Type 2 diabetes mellitus with nephropathy ([facility]  Hypokalemia  ICD (implantable cardioverter-defibrillator) in place  Peripheral vascular disease ([facility] A. RICKY (3/20/17): Right 0.58, Left 0.56.  Coronary artery disease involving native heart without angina pectoris  Systolic heart failure, ACC/AHA stage C ([facility]  Type 2 diabetes mellitus with complication ([facility]  Paroxysmal atrial fibrillation (HCC)  H/O: GI bleed  Hepatitis C  
 Chronic renal insufficiency  Dyslipidemia A. FLP (1/19/17): Tot 120, , HDL 19, LDL 76 (no Rx).  Ischemic cardiomyopathy A.  Echo (1/19/17):  EF 5-10% with severe GHK,. Mildly dil LA. Mild TR. PASP 46. B.  Cath (1/20/17):  LM ost70. LAD m50. D1 80. LCx d70; OM1 99, OM2 90. RCA p100. No AVG. PAP  53/27/36. C.  PCI (2/9/17): pRCA 2.5x38 Xience + 2.75x12 Xience. ostLM 4.0x12 Xience post-dil with 4.5x12 NC. D.  Echo (2/13/17):  EF 10% with severe GHK, PSM. Dil LA. Mod MR. Mild to mod TR. Left pleural effusion. E.  Echo (5/15/17): EF 25% with severe GHK and basl-mid inf AK, mildly dil LV, DD. Sev dil LA. Mod MR. Mild TR. PASP 35. F.  ICD (6/12/17, Anup): [employer].  NAVARRO (dyspnea on exertion) Reviewed PmHx, RxHx, FmHx, SocHx, AllgHx and updated and dated in the chart. Review of Systems - negative except as listed above in the HPI Objective:  
 
Vitals:  
 05/22/19 2259 BP: 129/71 Pulse: 60 Resp: 22 Temp: 97.9 °F (36.6 °C) SpO2: 97% Weight: 186 lb (84.4 kg) Height: 5' 10\" (1.778 m) Physical Examination: General appearance - alert, well appearing, and in no distress Neck - supple, no significant adenopathy Chest - clear to auscultation, no wheezes, rales or rhonchi, symmetric air entry Heart - normal rate, regular rhythm, normal S1, S2, no murmurs, rubs, clicks or gallops Abdomen - soft, nontender, nondistended, no masses or organomegaly Assessment/ Plan:  
Diagnoses and all orders for this visit: 
 
1. Type 2 diabetes mellitus with diabetic neuropathy, without long-term current use of insulin ([facility] -     LIPID PANEL 
-     METABOLIC PANEL, COMPREHENSIVE 
-     HEMOGLOBIN A1C WITH EAG Lab Results Component Value Date/Time Hemoglobin A1c 7.0 (H) 12/06/2018 01:56 PM  
 Hemoglobin A1c (POC) 6.4 08/17/2017 11:00 AM  
 
-at goal 
 
2. Systolic heart failure, ACC/AHA stage C ([facility] -     LIPID PANEL 
-     METABOLIC PANEL, COMPREHENSIVE 
-     HEMOGLOBIN A1C WITH EAG 
-seeing cardio 3. Paroxysmal atrial fibrillation (HCC) -stable 4. NAVARRO (dyspnea on exertion) -due to copd and chf 
 
5. Peripheral vascular disease ([facility] -stable 6. Pulmonary emphysema, unspecified emphysema type ([facility] 
-     umeclidinium-vilanterol (ANORO ELLIPTA) 62.5-25 mcg/actuation inhaler; Take 1 Puff by inhalation daily. 
-add rx -dwp to dc smoking with the patch and wean off the wellbutrin since it is not working 7. Routine general medical examination at a health care facility 
-see below 8. Advance care planning 
-needs LW dwp 9. Proteinuria, unspecified type Other orders -     buPROPion XL (WELLBUTRIN XL) 150 mg tablet; Take 1 Tab by mouth every morning. 
 
  
 
-Pain evaluation performed in office 
-Cognitive Screen performed in office 
-Depression Screen, Fall risks (by up and go test)  and ADL functionality were addressed 
-Medication list updated and reviewed for any changes  
-A comprehensive review of medical issues and a plan was formulated 
-End of life planning was addressed with pt  
-Health Screenings for preventions were addressed and a plan was formulated 
-Shingles Vaccine was recommended 
-Discussed with patient cancer risk factors and appropriate screenings for age 
-Patient evaluated for colonoscopy and referred if needed per screeing criteria 
-Labs from previous visits were discussed with patient  
-Discussed with patient diet and exercise and formulated a plan as needed 
-An Advanced care plan was developed with the patient. 
-Alcohol screening performed and was negative - Follow-up and Dispositions · Return in about 6 months (around 11/22/2019) for dm. I have discussed the diagnosis with the patient and the intended plan as seen in the above orders. The patient understands and agrees with the plan. The patient has received an after-visit summary and questions were answered concerning future plans. Medication Side Effects and Warnings were discussed with patien Patient Labs were reviewed and or requested Patient Past Records were reviewed and or requested There are no Patient Instructions on file for this visit.  
 
 
Ariana Rueda M.D.

## 2019-05-22 NOTE — TELEPHONE ENCOUNTER
Patient requesting refill of Bupropion  mg     Rx  [facility] on [address]    Phone to reach patient [phone]

## 2019-05-23 ENCOUNTER — TELEPHONE (OUTPATIENT)
Dept: CARDIOLOGY CLINIC | Age: 69
End: 2019-05-23

## 2019-05-23 LAB
ALBUMIN SERPL-MCNC: 4 G/DL (ref 3.6–4.8)
ALBUMIN/GLOB SERPL: 1.5 {RATIO} (ref 1.2–2.2)
ALP SERPL-CCNC: 63 IU/L (ref 39–117)
ALT SERPL-CCNC: 16 IU/L (ref 0–44)
AST SERPL-CCNC: 13 IU/L (ref 0–40)
BILIRUB SERPL-MCNC: 0.3 MG/DL (ref 0–1.2)
BUN SERPL-MCNC: 21 MG/DL (ref 8–27)
BUN/CREAT SERPL: 14 (ref 10–24)
CALCIUM SERPL-MCNC: 9.3 MG/DL (ref 8.6–10.2)
CHLORIDE SERPL-SCNC: 101 MMOL/L (ref 96–106)
CHOLEST SERPL-MCNC: 124 MG/DL (ref 100–199)
CO2 SERPL-SCNC: 21 MMOL/L (ref 20–29)
CREAT SERPL-MCNC: 1.5 MG/DL (ref 0.76–1.27)
EST. AVERAGE GLUCOSE BLD GHB EST-MCNC: 163 MG/DL
GLOBULIN SER CALC-MCNC: 2.7 G/DL (ref 1.5–4.5)
GLUCOSE SERPL-MCNC: 165 MG/DL (ref 65–99)
HBA1C MFR BLD: 7.3 % (ref 4.8–5.6)
HDLC SERPL-MCNC: 38 MG/DL
INTERPRETATION, 910389: NORMAL
INTERPRETATION: NORMAL
LDLC SERPL CALC-MCNC: 60 MG/DL (ref 0–99)
Lab: NORMAL
PDF IMAGE, 910387: NORMAL
POTASSIUM SERPL-SCNC: 4.7 MMOL/L (ref 3.5–5.2)
PROT SERPL-MCNC: 6.7 G/DL (ref 6–8.5)
SODIUM SERPL-SCNC: 138 MMOL/L (ref 134–144)
TRIGL SERPL-MCNC: 128 MG/DL (ref 0–149)
VLDLC SERPL CALC-MCNC: 26 MG/DL (ref 5–40)

## 2019-05-23 RX ORDER — SACUBITRIL AND VALSARTAN 49; 51 MG/1; MG/1
TABLET, FILM COATED ORAL
COMMUNITY
Start: 2019-05-14 | End: 2019-06-26 | Stop reason: SDUPTHER

## 2019-05-23 NOTE — TELEPHONE ENCOUNTER
Called and confirmed with patient: still taking:  sacubitril-valsartan (ENTRESTO) 49 mg/51 mg tablet  Reconciled/replaced to med list.

## 2019-05-23 NOTE — TELEPHONE ENCOUNTER
Patient calling stating he saw Dr. Shanna Simental yesterday and Dr. Shanna Simental stopped his entresto but it wasn't discussed with him. I see it was stopped but do not see anything in notes. Please advise, thank you.

## 2019-05-23 NOTE — TELEPHONE ENCOUNTER
I did not stop it, I pulled it off list because pt stated he was no longer taking, keep taking rx at this point

## 2019-05-23 NOTE — PROGRESS NOTES
After reviewing your labs, I believe they are within normal 
limits for your age and let us stay with our current plan of action. If you have any questions, feel free to email thru [facility], or give us  
a call back. Mayra Gilman M.D. Good Help to Those in Need \"You maybe whatever you resolve to be\"

## 2019-05-30 RX ORDER — AMIODARONE HYDROCHLORIDE 200 MG/1
200 TABLET ORAL 2 TIMES DAILY
Qty: 60 TAB | Refills: 0 | Status: SHIPPED | OUTPATIENT
Start: 2019-05-30 | End: 2019-07-12 | Stop reason: SDUPTHER

## 2019-06-05 ENCOUNTER — CLINICAL SUPPORT (OUTPATIENT)
Dept: CARDIOLOGY CLINIC | Age: 69
End: 2019-06-05

## 2019-06-05 DIAGNOSIS — Z95.810 AUTOMATIC IMPLANTABLE CARDIAC DEFIBRILLATOR IN SITU: Primary | ICD-10-CM

## 2019-06-25 ENCOUNTER — TELEPHONE (OUTPATIENT)
Dept: CARDIOLOGY CLINIC | Age: 69
End: 2019-06-25

## 2019-06-26 ENCOUNTER — OFFICE VISIT (OUTPATIENT)
Dept: CARDIOLOGY CLINIC | Age: 69
End: 2019-06-26

## 2019-06-26 ENCOUNTER — TELEPHONE (OUTPATIENT)
Dept: CARDIOLOGY CLINIC | Age: 69
End: 2019-06-26

## 2019-06-26 VITALS
RESPIRATION RATE: 20 BRPM | HEART RATE: 65 BPM | HEIGHT: 70 IN | SYSTOLIC BLOOD PRESSURE: 148 MMHG | BODY MASS INDEX: 28.06 KG/M2 | OXYGEN SATURATION: 97 % | DIASTOLIC BLOOD PRESSURE: 82 MMHG | WEIGHT: 196 LBS | TEMPERATURE: 98 F

## 2019-06-26 DIAGNOSIS — G62.9 NEUROPATHY: ICD-10-CM

## 2019-06-26 DIAGNOSIS — I50.22 CHRONIC SYSTOLIC CONGESTIVE HEART FAILURE, NYHA CLASS 3 (HCC): ICD-10-CM

## 2019-06-26 DIAGNOSIS — I25.10 CORONARY ARTERY DISEASE INVOLVING NATIVE CORONARY ARTERY OF NATIVE HEART WITHOUT ANGINA PECTORIS: ICD-10-CM

## 2019-06-26 DIAGNOSIS — I73.9 CLAUDICATION (HCC): ICD-10-CM

## 2019-06-26 DIAGNOSIS — I48.0 PAROXYSMAL ATRIAL FIBRILLATION (HCC): ICD-10-CM

## 2019-06-26 DIAGNOSIS — I73.9 PERIPHERAL VASCULAR DISEASE (HCC): ICD-10-CM

## 2019-06-26 DIAGNOSIS — E78.5 DYSLIPIDEMIA: ICD-10-CM

## 2019-06-26 DIAGNOSIS — E11.21 TYPE 2 DIABETES MELLITUS WITH NEPHROPATHY (HCC): ICD-10-CM

## 2019-06-26 DIAGNOSIS — Z91.14 NONCOMPLIANCE WITH MEDICATION REGIMEN: Primary | ICD-10-CM

## 2019-06-26 DIAGNOSIS — B18.2 CHRONIC HEPATITIS C WITHOUT HEPATIC COMA (HCC): ICD-10-CM

## 2019-06-26 DIAGNOSIS — Z95.810 ICD (IMPLANTABLE CARDIOVERTER-DEFIBRILLATOR) IN PLACE: ICD-10-CM

## 2019-06-26 DIAGNOSIS — R06.09 DOE (DYSPNEA ON EXERTION): ICD-10-CM

## 2019-06-26 DIAGNOSIS — I44.7 INCOMPLETE LEFT BUNDLE BRANCH BLOCK (LBBB): ICD-10-CM

## 2019-06-26 DIAGNOSIS — I25.5 ISCHEMIC CARDIOMYOPATHY: ICD-10-CM

## 2019-06-26 DIAGNOSIS — I50.20 SYSTOLIC HEART FAILURE, ACC/AHA STAGE C (HCC): ICD-10-CM

## 2019-06-26 RX ORDER — SACUBITRIL AND VALSARTAN 49; 51 MG/1; MG/1
2 TABLET, FILM COATED ORAL 2 TIMES DAILY
Qty: 60 TAB | Refills: 11 | Status: SHIPPED | OUTPATIENT
Start: 2019-06-26 | End: 2019-06-27

## 2019-06-26 NOTE — PROGRESS NOTES
[address] Note    Patient name: Rosa Lama. Patient : 1950  Patient MRN: [id number]  Date of service: 19    Primary care physician: Cookie Mcnally MD  Primary cardiologist: Dr. Clemente Arroyo:  Chief Complaint   Patient presents with    CHF       PLAN:  Continue current medical therapy for heart failure  Continue current dose of toprol XL 50mg daily  Continue entresto 49/51mg twice daily, will uptitrate if possible on next clinic visit/pending labs  Cannot tolerate aldactone due to hyperkalemia; not interested in taking veltassa  Take torsemide 20mg daily x2 days, for 3-5 lb weight loss.   Call office with weight/BP's Friday AM  Stop Wellbutrin XL to 300mg, not working for pt  OK to start nicotine patches per PCP   Continue ASA, plavix and statin for CAD  Continue amiodarone for PAF, check thyroid profile every 3 months and PFT/DLCO every year   ICD interrogation every 3 months per routine, follow-up by Dr. Jan Hein annual echocardiogram and EKG in 2019  Labs today: CBC, BMP, pro-NT-BNP  Reinforced low salt diet  Reinforced fluid restriction to 6 x 8oz glasses per day  Discussed tobacco cessation and materials dispensed  Advanced care plan present on the chart  Counseled to obtain home scale and arm blood pressure cuff  Document in diary BP/HR before and 2 hours after taking medications and daily weights  Recommend flu vaccination annually and pneumonia vaccine every 5 years  Follow up with Endocrinologist, Dr. Harrison Lira in  (on cancellation list) rescheduled/ cancelled original appointment   Patient declines referral with vascular surgery for PAD  Follow-up with GI on h/o GI bleed and colorectal surgery, Dr. Mahi Bundy, repeat EGD/colosocopy wants date to stop Plavix  Follow-up with primary cardiologist, Dr. Rueda Left  Follow-up with EP cardiologist, Dr. Ranjan Jordan with PCP,  Dr. Shanna Diaz for congested cough and wheezing and diabetes management; patient declined Jardiance  Patient declined referral to pulmonary for COPD and sleep study  Return to [facility] in 2-4 weeks with NP/MD    IMPRESSION:  Chronic systolic heart failure  Stage C, NYHA class II symptoms  Coronary artery disease   S/p impella assisted high risk PCI with MIKE to 100% p-RCA and 70% LM 2/9/17 by Dr. Lulú Garcia  Ischemic cardiomyopathy, LVEF 25%  HTN  PAD not amenable to PTCA  Incomplete LBBB  CKD3  KELLY  COPD  S/p GI bleed and colorectal surgery  Medical noncompliance  Claudication  PAD  Hepatitis C    CARDIAC IMAGING:  Echo (7/11/18) LVEF 25%, akinesis of basal-mid inferior wall. PA pressure 42mmHg. ICD interrogation (12/6/18) no events, normal device function, good batteries    HEMODYNAMICS:  RHC not done  CPEST not done  6MW not done    HISTORY OF PRESENT ILLNESS:  I had the pleasure of seeing Alonso Maldonado in [address] [facility] in [address] [facility]. Briefly, Alonso Maldonado is a 79 y.o. male with h/o CAD s/p PCI of LM,  of RCA, ischemic cardiomyopathy with LVEF 67%, s/p AICD complicated by  PAF (amiodarone), Hep C, GI bleed (s/p colorectal surgery), current tobacco abuse and PAD. INTERVAL HISTORY:  Today, patient presents for routine clinic visit. Patient is doing alright, but c/o congested cough with thin liquid sputum. Still smoking 10-12/day cigarettes. His BP is elevated today 148/82, he has been taking all of his medications. HR 60's. Denies symptoms of presyncope or syncope, or lightheadedness. He reports not taking home BPs, he did not not bring his home BP cuff with him today. Patient walked to our clinic from parking garage without having to slow down or stop. Patient can walk more than one block without symptoms of fatigue or shortness of breath.  Patient can perform home activities without problem and routinely participates in daily walking for more than 15 minutes. He reports NAVARRO with fast walking only time he is dyspneic while doing yard work, walking up  7 stairs at his house, getting in the bed at night. Patient denies symptoms of volume overload, although his has significant abdominal bloating without leg edema. He reports increased appetite and attributes this to his 10lbs weight gain over the last month. Patient denies irregular heart rate or palpitations. ICD has not fired. Patient denies other cardiac symptoms such as chest pain. Positive for claudication. Patient is not compliant with fluid restriction or always taking medications as prescribed. Discussed importance of medication compliance. REVIEW OF SYSTEMS:  General: Denies fever, night sweats. Ear, nose and throat: Denies difficulty hearing, sinus problems, runny nose, post-nasal drip, ringing in ears, mouth sores, loose teeth, ear pain, nosebleeds, sore throate, facial pain or numbess  Cardiovascular: see above in the interval history  Respiratory: Positive for cough, wheezing, and sputum production, Denies hemoptysis. Gastrointestinal: Denies heartburn, constipation, intolerance to certain foods, diarrhea, abdominal pain, nausea, vomiting, difficulty swallowing, blood in stool  Kidney and bladder: Denies painful urination, frequent urination, urgency, prostate problems and impotence  Musculoskeletal: Denies joint pain, muscle weakness, +claudication   Skin and hair: Denies change in existing skin lesions, hair loss or increase, breast changes    PHYSICAL EXAM:  Vital signs:   Visit Vitals  /82 (BP 1 Location: Right arm, BP Patient Position: Sitting)   Pulse 65   Temp 98 °F (36.7 °C) (Oral)   Resp 20   Ht 5' 10\" (1.778 m)   Wt 196 lb (88.9 kg)   SpO2 97%   BMI 28.12 kg/m²     General: Patient is well developed, well-nourished in no acute distress  HEENT: Normocephalic and atraumatic. No scleral icterus.  Pupils are equal, round and reactive to light and accomodation. No conjunctival injection. Oropharynx is clear. Neck: Supple. No evidence of thyroid enlargements or lymphadenopathy. JVD: Cannot be appreciated   Lungs: Breath sounds are clear but diminished in posterior bases, +congested cough, no rhonchi, wheezing or rales. Heart: Regular rate and rhythm with normal S1 and S2. No murmurs, gallops or rubs. ICD in RU chest  Abdomen: Soft, no mass or tenderness. No organomegaly or hernia. Bowel sounds present. Genitourinary and rectal: deferred  Extremities: No cyanosis, clubbing, or edema. Neurologic: No focal sensory or motor deficits are noted. Grossly intact. Psychiatric: Awake, alert and oriented x 3. Appropriate mood and affect. Skin: Warm, dry and well perfused, flaky BLE, pink/yasir . No lesions, nodules or rashes are noted. PAST MEDICAL HISTORY:  Past Medical History:   Diagnosis Date    Arthritis     hands/fingers    CAD (coronary artery disease)     involving native coronary artery of native heart without angina pectoris    Cardiomyopathy ([address] 01/20/2017    A. Echo (1/19/17):  EF 5-10% with severe GHK,. Mildly dil LA. Mild TR. PASP 46./systemic cardiomyopathy    Chronic kidney disease     Chronic obstructive pulmonary disease (HCC)     Chronic renal insufficiency 03/06/2017    Chronic systolic congestive heart failure, NYHA class 3 ([address] 1/8/2019    Claudication ([address] 10/9/2018    Congestive heart failure ([address]     Diabetes mellitus ([address] 3/6/2017    IDDM    Dyslipidemia 3/6/2017    A. FLP (1/19/17): Tot 120, , HDL 19, LDL 76 (no Rx).  H/O: GI bleed 3/6/2017    Hepatitis C 3/6/2017    Hypokalemia     ICD (implantable cardioverter-defibrillator) in place     Ill-defined condition     flu 1/2018     Neuropathy 11/19/2018    Noncompliance with medication regimen 9/11/2018    Paroxysmal atrial fibrillation ([address] 3/6/2017    Peripheral vascular disease ([address] 9/18/2017    A. RICKY (3/20/17):   Right 0.58, Left 0.56.    Tobacco dependence syndrome 10/9/2018    Vitamin D deficiency 2018       PAST SURGICAL HISTORY:  Past Surgical History:   Procedure Laterality Date    COLONOSCOPY N/A 2017    COLONOSCOPY performed by Momo Connelly. Niranjan Vogel MD at [facility] COLONOSCOPY N/A 2018    COLONOSCOPY performed by Momo Connelly.  Niranjan Vogel MD at [facility] ENDOSCOPY    HX CORONARY STENT PLACEMENT      LM, RCA x 2     HX GI      colonoscopy x 3 - last 2017 - polyp    HX IMPLANTABLE CARDIOVERTER DEFIBRILLATOR         FAMILY HISTORY:  Family History   Problem Relation Age of Onset    Hypertension Mother     Heart Disease Mother         MURMUR    Cancer Father         COLON    Colon Cancer Father     Other Sister         born totally handicapped/epileptic    Kidney Disease Sister          from renal shutdown    Heart Disease Brother     Other Brother         ?pancreatic cancer or ?pancreatitis    Cancer Maternal Uncle         toes and spread    Cancer Paternal Uncle         stomach    Heart Attack Maternal Grandfather 47    Cancer Paternal Grandfather         bone    Cancer Maternal Uncle         stomach    Cancer Maternal Uncle         lung    Anesth Problems Neg Hx        SOCIAL HISTORY:  Social History     Socioeconomic History    Marital status:      Spouse name: Not on file    Number of children: Not on file    Years of education: Not on file    Highest education level: Not on file   Tobacco Use    Smoking status: Current Some Day Smoker     Packs/day: 0.50     Years: 45.00     Pack years: 22.50     Last attempt to quit: 2017     Years since quittin.4    Smokeless tobacco: Never Used    Tobacco comment: using Nicotine patches   Substance and Sexual Activity    Alcohol use: No    Drug use: Yes     Types: Marijuana    Sexual activity: Never     Partners: Female       LABORATORY RESULTS:     Labs Latest Ref Rng & Units 2019   WBC 3.4 - 10.8 x10E3/uL - 8.4 6. 2   RBC 4.14 - 5.80 x10E6/uL - 5.33 5.10   Hemoglobin 13.0 - 17.7 g/dL - 13.3 12. 9(L)   Hematocrit 37.5 - 51.0 % - 42.3 40.4   MCV 79 - 97 fL - 79 79   Platelets 296 - 006 [id number]/FI - 154 119(L)   Albumin 3.6 - 4.8 g/dL 4.0 - -   Calcium 8.6 - 10.2 mg/dL 9.3 10.0 9.6   SGOT 0 - 40 IU/L 13 - -   Glucose 65 - 99 mg/dL 165(H) 150(H) 183(H)   BUN 8 - 27 mg/dL 21 38(H) 60(H)   Creatinine 0.76 - 1.27 mg/dL 1.50(H) 2.03(H) 2.09(H)   Sodium 134 - 144 mmol/L 138 140 138   Potassium 3.5 - 5.2 mmol/L 4.7 4.8 4.7   Some recent data might be hidden     Lab Results   Component Value Date/Time    TSH 0.049 (L) 04/10/2019 12:00 AM    TSH 1.050 12/06/2018 01:56 PM    TSH 3.040 12/04/2017 10:17 AM    TSH 2.04 01/22/2017 11:11 AM    TSH 1.49 01/18/2017 11:12 PM       CURRENT MEDICATIONS:    Current Outpatient Medications:     ENTRESTO 49-51 mg tab tablet, Take 2 Tabs by mouth two (2) times a day., Disp: 60 Tab, Rfl: 11    amiodarone (CORDARONE) 200 mg tablet, Take 1 Tab by mouth two (2) times a day., Disp: 60 Tab, Rfl: 0    umeclidinium-vilanterol (ANORO ELLIPTA) 62.5-25 mcg/actuation inhaler, Take 1 Puff by inhalation daily. , Disp: 1 Inhaler, Rfl: 5    torsemide (DEMADEX) 20 mg tablet, Take 1 Tab by mouth as needed for Other (weight gain 2 lbs in one day or 5 lbs in one week). , Disp: 180 Tab, Rfl: 1    cholecalciferol (VITAMIN D3) 5,000 unit capsule, Take 1 Cap by mouth daily. , Disp: 30 Cap, Rfl: 2    potassium chloride (K-DUR, KLOR-CON) 20 mEq tablet, Take 1 Tab by mouth three (3) times daily. , Disp: 60 Tab, Rfl: 3    metoprolol succinate (TOPROL-XL) 100 mg tablet, Take 0.5 Tabs by mouth daily for 360 days. Indications: chronic heart failure (Patient taking differently: Take 100 mg by mouth daily.  Indications: chronic heart failure), Disp: 90 Tab, Rfl: 1    atorvastatin (LIPITOR) 40 mg tablet, TAKE ONE TABLET BY MOUTH ONCE DAILY  Indications: excessive fat in the blood, Disp: 90 Tab, Rfl: 3    clopidogrel (PLAVIX) 75 mg tab, Take 1 Tab by mouth daily. , Disp: 90 Tab, Rfl: 3    insulin NPH/insulin regular (NOVOLIN 70/30 U-100 INSULIN) 100 unit/mL (70-30) injection, INJECT 10 UNITS SUBCUTANEOUSLY IN THE MORNING AND 8 UNIT IN THE EVENING, Disp: 5 Vial, Rfl: 5    aspirin 81 mg chewable tablet, Take 81 mg by mouth daily. , Disp: , Rfl:     Insulin Syringes, Disposable, 1 mL syrg, Pt to take 10 units w/ breakfast and 8 units w/ dinner, Disp: 500 Syringe, Rfl: 1    acetaminophen (TYLENOL EXTRA STRENGTH) 500 mg tablet, Take 1,000 mg by mouth every six (6) hours as needed for Pain. Indications: BACK PAIN, Disp: , Rfl:       Thank you for your referral and allowing me to participate in this patient's care.     Milton Duarte NP  [address] [place] [address], [address]  Phone: [phone]  Fax: [phone]

## 2019-06-26 NOTE — PATIENT INSTRUCTIONS
STOP taking the wellbutrin -have PCP start nicotine patches     Take torsemide when get ome today and tomorrow AM, call with weight and BP on Friday AM.  If you don't loss 3lbs plan to make sooner appointment in clinic    CONTINUE CURRENT medications    Take twice a day medications 12 hours apart with food    Labs today BMP, proBNP, Mag     ECHO will be scheduled - they will call you    See GI for colonoscopy, Endocrinology regarding hyperthyroidism    Limit WHITE foods ( flour, sugar, pasta, rice, potatoes), sodium, fluid  Stay active as tolerated- try to avoid the heat this summer    Follow up with [facility] in 2-4 weeks    HF Education: Continue daily weights (in the morning, after voiding). Notify HF team of overnight weight gains > 2 lbs or weekly >5 lbs or if any of the following Sx. Continue to limit sodium intake & monitor your fluid intake .

## 2019-06-27 LAB
BUN SERPL-MCNC: 22 MG/DL (ref 8–27)
BUN/CREAT SERPL: 15 (ref 10–24)
CALCIUM SERPL-MCNC: 9.2 MG/DL (ref 8.6–10.2)
CHLORIDE SERPL-SCNC: 101 MMOL/L (ref 96–106)
CO2 SERPL-SCNC: 19 MMOL/L (ref 20–29)
CREAT SERPL-MCNC: 1.46 MG/DL (ref 0.76–1.27)
ERYTHROCYTE [DISTWIDTH] IN BLOOD BY AUTOMATED COUNT: 18.9 % (ref 12.3–15.4)
GLUCOSE SERPL-MCNC: 184 MG/DL (ref 65–99)
HCT VFR BLD AUTO: 43 % (ref 37.5–51)
HGB BLD-MCNC: 14.6 G/DL (ref 13–17.7)
MAGNESIUM SERPL-MCNC: 2 MG/DL (ref 1.6–2.3)
MCH RBC QN AUTO: 27.5 PG (ref 26.6–33)
MCHC RBC AUTO-ENTMCNC: 34 G/DL (ref 31.5–35.7)
MCV RBC AUTO: 81 FL (ref 79–97)
NT-PROBNP SERPL-MCNC: 4271 PG/ML (ref 0–376)
PLATELET # BLD AUTO: 152 X10E3/UL (ref 150–450)
POTASSIUM SERPL-SCNC: 4.4 MMOL/L (ref 3.5–5.2)
RBC # BLD AUTO: 5.3 X10E6/UL (ref 4.14–5.8)
SODIUM SERPL-SCNC: 142 MMOL/L (ref 134–144)
WBC # BLD AUTO: 8.4 X10E3/UL (ref 3.4–10.8)

## 2019-06-27 RX ORDER — SACUBITRIL AND VALSARTAN 49; 51 MG/1; MG/1
1 TABLET, FILM COATED ORAL 2 TIMES DAILY
Qty: 60 TAB | Refills: 11
Start: 2019-06-27 | End: 2019-07-17 | Stop reason: DRUGHIGH

## 2019-06-28 ENCOUNTER — TELEPHONE (OUTPATIENT)
Dept: CARDIOLOGY CLINIC | Age: 69
End: 2019-06-28

## 2019-06-28 NOTE — TELEPHONE ENCOUNTER
Patient called. Vini Angel was given prescription at last appt. He said told to call to report his weight. Today is 191 pounds. Please call him. He wanted to report weight to [place] and to Maria C.     [phone]

## 2019-06-28 NOTE — TELEPHONE ENCOUNTER
Missed a phone call from the patient and called him back. He shared the entresto phone line is down but he will continue to call - he denies any major questions/concerns at this time.     Michael Marie, MSW, LCSW    Clinical    Jennifer [facility] [phone]

## 2019-06-28 NOTE — TELEPHONE ENCOUNTER
I returned call to patient, he states he has lost 7 pounds, he feels better, no shortness of breath, no swelling, he is feeling good, just \"eating like crazy\".

## 2019-07-01 ENCOUNTER — TELEPHONE (OUTPATIENT)
Dept: CARDIOLOGY CLINIC | Age: 69
End: 2019-07-01

## 2019-07-01 RX ORDER — TORSEMIDE 20 MG/1
10 TABLET ORAL DAILY
Qty: 180 TAB | Refills: 1
Start: 2019-07-01 | End: 2019-01-01

## 2019-07-01 NOTE — TELEPHONE ENCOUNTER
I called patient and advised him per JING Bourne:  Please have him take 10mg torsemide daily, if he gains >2 lb overnight or >5lb in week take an extra 10mg torsemide.  Continue daily weights and BPs. He states understanding, has no further questions.

## 2019-07-11 ENCOUNTER — HOSPITAL ENCOUNTER (OUTPATIENT)
Dept: NON INVASIVE DIAGNOSTICS | Age: 69
Discharge: HOME | End: 2019-07-11
Attending: NURSE PRACTITIONER
Payer: MEDICARE

## 2019-07-11 VITALS — HEIGHT: 70 IN | BODY MASS INDEX: 28.12 KG/M2

## 2019-07-11 DIAGNOSIS — E11.21 TYPE 2 DIABETES MELLITUS WITH NEPHROPATHY (HCC): ICD-10-CM

## 2019-07-11 DIAGNOSIS — I50.22 CHRONIC SYSTOLIC CONGESTIVE HEART FAILURE, NYHA CLASS 3 (HCC): ICD-10-CM

## 2019-07-11 DIAGNOSIS — I48.0 PAROXYSMAL ATRIAL FIBRILLATION (HCC): ICD-10-CM

## 2019-07-11 DIAGNOSIS — E78.5 DYSLIPIDEMIA: ICD-10-CM

## 2019-07-11 DIAGNOSIS — R06.09 DOE (DYSPNEA ON EXERTION): ICD-10-CM

## 2019-07-11 DIAGNOSIS — Z95.810 ICD (IMPLANTABLE CARDIOVERTER-DEFIBRILLATOR) IN PLACE: ICD-10-CM

## 2019-07-11 DIAGNOSIS — G62.9 NEUROPATHY: ICD-10-CM

## 2019-07-11 DIAGNOSIS — I25.10 CORONARY ARTERY DISEASE INVOLVING NATIVE CORONARY ARTERY OF NATIVE HEART WITHOUT ANGINA PECTORIS: ICD-10-CM

## 2019-07-11 DIAGNOSIS — B18.2 CHRONIC HEPATITIS C WITHOUT HEPATIC COMA (HCC): ICD-10-CM

## 2019-07-11 DIAGNOSIS — I73.9 PERIPHERAL VASCULAR DISEASE (HCC): ICD-10-CM

## 2019-07-11 DIAGNOSIS — I25.5 ISCHEMIC CARDIOMYOPATHY: ICD-10-CM

## 2019-07-11 DIAGNOSIS — I44.7 INCOMPLETE LEFT BUNDLE BRANCH BLOCK (LBBB): ICD-10-CM

## 2019-07-11 DIAGNOSIS — I73.9 CLAUDICATION (HCC): ICD-10-CM

## 2019-07-11 DIAGNOSIS — I50.20 SYSTOLIC HEART FAILURE, ACC/AHA STAGE C (HCC): ICD-10-CM

## 2019-07-11 LAB
ECHO AO ROOT DIAM: 4.12 CM
ECHO AV AREA PEAK VELOCITY: 3.5 CM2
ECHO AV PEAK GRADIENT: 4.3 MMHG
ECHO AV PEAK VELOCITY: 104.04 CM/S
ECHO EST RA PRESSURE: 3 MMHG
ECHO LA AREA 4C: 22.1 CM2
ECHO LA MAJOR AXIS: 4.48 CM
ECHO LA TO AORTIC ROOT RATIO: 1.09
ECHO LA VOL 2C: 84.96 ML (ref 18–58)
ECHO LA VOL 4C: 62.57 ML (ref 18–58)
ECHO LA VOL BP: 80.32 ML (ref 18–58)
ECHO LV E' LATERAL VELOCITY: 3 CM/S
ECHO LV E' SEPTAL VELOCITY: 4.39 CM/S
ECHO LV EDV A2C: 163.8 ML
ECHO LV EDV A4C: 229.1 ML
ECHO LV EDV BP: 199.1 ML (ref 67–155)
ECHO LV EJECTION FRACTION A2C: 43 %
ECHO LV EJECTION FRACTION A4C: 25 %
ECHO LV EJECTION FRACTION BIPLANE: 35.3 % (ref 55–100)
ECHO LV ESV A2C: 92.7 ML
ECHO LV ESV A4C: 171.4 ML
ECHO LV ESV BP: 128.9 ML (ref 22–58)
ECHO LV INTERNAL DIMENSION DIASTOLIC: 5.32 CM (ref 4.2–5.9)
ECHO LV INTERNAL DIMENSION SYSTOLIC: 4.8 CM
ECHO LV IVSD: 1.11 CM (ref 0.6–1)
ECHO LV MASS 2D: 304.1 G (ref 88–224)
ECHO LV POSTERIOR WALL DIASTOLIC: 1.27 CM (ref 0.6–1)
ECHO LVOT DIAM: 2.29 CM
ECHO LVOT PEAK GRADIENT: 3.1 MMHG
ECHO LVOT PEAK VELOCITY: 87.4 CM/S
ECHO MV A VELOCITY: 27.13 CM/S
ECHO MV AREA PHT: 5.8 CM2
ECHO MV E DECELERATION TIME (DT): 130.3 MS
ECHO MV E VELOCITY: 121.25 CM/S
ECHO MV E/A RATIO: 4.47
ECHO MV E/E' LATERAL: 40.42
ECHO MV E/E' RATIO (AVERAGED): 34.02
ECHO MV E/E' SEPTAL: 27.62
ECHO MV PRESSURE HALF TIME (PHT): 37.8 MS
ECHO MV REGURGITANT PEAK GRADIENT: 171.1 MMHG
ECHO MV REGURGITANT PEAK VELOCITY: 654.02 CM/S
ECHO PULMONARY ARTERY SYSTOLIC PRESSURE (PASP): 43.2 MMHG
ECHO PV MAX VELOCITY: 127.1 CM/S
ECHO PV PEAK GRADIENT: 6.5 MMHG
ECHO RIGHT VENTRICULAR SYSTOLIC PRESSURE (RVSP): 43.2 MMHG
ECHO RV INTERNAL DIMENSION: 4.58 CM
ECHO RV TAPSE: 2.38 CM (ref 1.5–2)
ECHO TV REGURGITANT MAX VELOCITY: 317.16 CM/S
ECHO TV REGURGITANT PEAK GRADIENT: 40.2 MMHG

## 2019-07-11 PROCEDURE — 93306 TTE W/DOPPLER COMPLETE: CPT

## 2019-07-12 ENCOUNTER — TELEPHONE (OUTPATIENT)
Dept: CARDIOLOGY CLINIC | Age: 69
End: 2019-07-12

## 2019-07-12 RX ORDER — AMIODARONE HYDROCHLORIDE 200 MG/1
200 TABLET ORAL 2 TIMES DAILY
Qty: 60 TAB | Refills: 0 | Status: SHIPPED | OUTPATIENT
Start: 2019-07-12 | End: 2019-07-18

## 2019-07-12 NOTE — TELEPHONE ENCOUNTER
Patient states he is all out of medication. He states if Dr. Jimbo Amaral, wants him to keep taking it, he will need a refill. Please let patient know!    Phone: [phone]

## 2019-07-17 ENCOUNTER — OFFICE VISIT (OUTPATIENT)
Dept: CARDIOLOGY CLINIC | Age: 69
End: 2019-07-17

## 2019-07-17 VITALS
WEIGHT: 195.2 LBS | RESPIRATION RATE: 20 BRPM | SYSTOLIC BLOOD PRESSURE: 134 MMHG | HEIGHT: 70 IN | BODY MASS INDEX: 27.94 KG/M2 | TEMPERATURE: 98.4 F | OXYGEN SATURATION: 96 % | HEART RATE: 73 BPM | DIASTOLIC BLOOD PRESSURE: 80 MMHG

## 2019-07-17 DIAGNOSIS — F17.200 TOBACCO DEPENDENCE SYNDROME: ICD-10-CM

## 2019-07-17 DIAGNOSIS — E11.21 TYPE 2 DIABETES MELLITUS WITH NEPHROPATHY (HCC): ICD-10-CM

## 2019-07-17 DIAGNOSIS — E11.8 TYPE 2 DIABETES MELLITUS WITH COMPLICATION, UNSPECIFIED WHETHER LONG TERM INSULIN USE: ICD-10-CM

## 2019-07-17 DIAGNOSIS — I50.22 CHRONIC SYSTOLIC CONGESTIVE HEART FAILURE, NYHA CLASS 3 (HCC): ICD-10-CM

## 2019-07-17 DIAGNOSIS — I73.9 PERIPHERAL VASCULAR DISEASE (HCC): ICD-10-CM

## 2019-07-17 DIAGNOSIS — N18.30 CHRONIC RENAL IMPAIRMENT, STAGE 3 (MODERATE) (HCC): ICD-10-CM

## 2019-07-17 DIAGNOSIS — Z91.14 NONCOMPLIANCE WITH MEDICATION REGIMEN: ICD-10-CM

## 2019-07-17 DIAGNOSIS — I25.5 ISCHEMIC CARDIOMYOPATHY: Primary | ICD-10-CM

## 2019-07-17 DIAGNOSIS — I48.0 PAROXYSMAL ATRIAL FIBRILLATION (HCC): ICD-10-CM

## 2019-07-17 DIAGNOSIS — I25.10 CORONARY ARTERY DISEASE INVOLVING NATIVE CORONARY ARTERY OF NATIVE HEART WITHOUT ANGINA PECTORIS: ICD-10-CM

## 2019-07-17 DIAGNOSIS — R06.02 SOB (SHORTNESS OF BREATH): ICD-10-CM

## 2019-07-17 DIAGNOSIS — I50.20 SYSTOLIC HEART FAILURE, ACC/AHA STAGE C (HCC): ICD-10-CM

## 2019-07-17 DIAGNOSIS — G62.9 NEUROPATHY: ICD-10-CM

## 2019-07-17 DIAGNOSIS — I44.7 INCOMPLETE LEFT BUNDLE BRANCH BLOCK (LBBB): ICD-10-CM

## 2019-07-17 DIAGNOSIS — I73.9 CLAUDICATION (HCC): ICD-10-CM

## 2019-07-17 DIAGNOSIS — E78.5 DYSLIPIDEMIA: ICD-10-CM

## 2019-07-17 DIAGNOSIS — R06.09 DOE (DYSPNEA ON EXERTION): ICD-10-CM

## 2019-07-17 DIAGNOSIS — B18.2 CHRONIC HEPATITIS C WITHOUT HEPATIC COMA (HCC): ICD-10-CM

## 2019-07-17 NOTE — PROGRESS NOTES
[facility] in [place], [address] [place] [address] Note    Patient name: Ivett Phan.   Patient : 1950  Patient MRN: [id number]  Date of service: 19    Primary care physician: Moraima Ross MD  Primary cardiologist: Dr. Natasha Kirkpatrick:  Chief Complaint   Patient presents with    CHF       PLAN:  Continue current medical therapy for heart failure  Continue current dose of toprol XL 50mg daily  Increase Entresto 97/103 mg twice daily  Cannot tolerate aldactone due to hyperkalemia; not interested in taking veltassa  Decrease torsemide to 10mg daily   OK to start nicotine patches per PCP   Continue ASA, plavix and statin for CAD  Continue amiodarone for PAF, check thyroid profile every 3 months and PFT/DLCO every year   ICD interrogation last check 18, follow-up with Dr. Rita Delvalle  EKG today  Discuss ECHO results from 19- LVEF 21-25%, LVGH, mild MR, mild TR, mild pulm HTN, est PA 45mmHg, LVIDd 5.32cm, Tapse 2.38cm  Labs today: CBC, CMP, pro-NT-BNP, TFT's   Reinforced low salt diet  Reinforced fluid restriction to 6 x 8oz glasses per day  Discussed tobacco cessation   Advanced care plan present on the chart  Counseled to obtain home scale and arm blood pressure cuff  Document in diary BP/HR before and 2 hours after taking medications and daily weights  Recommend flu vaccination annually and pneumonia vaccine every 5 years  Follow up with Endocrinologist, Dr. Shraddha Arriaga rescheduled/ cancelled original appointment for 19  Patient declines referral with vascular surgery for PAD  Follow-up with GI on h/o GI bleed and colorectal surgery, Dr. Zafar Braden, repeat EGD/colosocopy wants date to stop Plavix  Follow-up with primary cardiologist, Dr. Angeles Peralta  Follow-up with EP cardiologist, Dr. Rita Delvalle for follow up and interrogation of ICD  Follow-up with PCP, Dr. Chloe Mariee. [facility] for diabetes management; patient declined Jardiance  Patient declined referral to pulmonary for COPD and sleep study  Return to [facility] in 4 weeks with NP/MD    IMPRESSION:  Chronic systolic heart failure  Stage C, NYHA class II symptoms  Coronary artery disease   S/p impella assisted high risk PCI with MIKE to 100% p-RCA and 70% LM 2/9/17 by Dr. Vineet Rueda  Ischemic cardiomyopathy, LVEF 25%  HTN  PAD not amenable to PTCA  Incomplete LBBB  CKD3  KELLY  COPD  S/p GI bleed and colorectal surgery  Medical noncompliance  Claudication  PAD  Hepatitis C    CARDIAC IMAGING:  Echo (7/11/18) LVEF 25%, akinesis of basal-mid inferior wall. PA pressure 42mmHg. ECHO (7/11/19) 21-25%, LVGH, mild MR, mild TR, mild pulm HTN, est PA 45mmHg, LVIDd 5.32cm, Tapse 2.38cm  ICD interrogation (12/6/18) no events, normal device function, good batteries    HEMODYNAMICS:  RHC not done  CPEST not done  6MW not done    HISTORY OF PRESENT ILLNESS:  I had the pleasure of seeing Roxy Rodriges in [facility] in [facility]. Briefly, Roxy Rodriges is a 79 y.o. male with h/o CAD s/p PCI of LM,  of RCA, ischemic cardiomyopathy with LVEF 58%, s/p AICD complicated by  PAF (amiodarone), Hep C, GI bleed (s/p colorectal surgery), current tobacco abuse and PAD. INTERVAL HISTORY:  Today, patient presents for routine clinic visit. Patient is doing better. Has increased cigarette smoking to 1ppd. He did not take his morning medications prior to clinic visit, BP is 134/80. He has been compliant with medications. Denies symptoms of presyncope, syncope, lightheadedness, dizziness, orthopnea, PND, nocturia, CP, palpitations, or SOB. Patient walked to our clinic from parking lot without having to slow down or stop. Patient can walk more than one block without symptoms of fatigue or shortness of breath. Patient can perform home activities without a problem and routinely participates in daily walking for more than 15 minutes.  He reports NAVARRO with fast walking as the only time he is dyspneic or while doing yard work. Patient denies symptoms of volume overload, although his has significant abdominal fat he attributes to overeating. He reports increased appetite and denies any dietary discrepancies from a low Na+/CHO diet attributing his 10lbs weight gain over the last 2months. His weight has not changed from the previous clinic visit. Patient denies irregular heart rate or palpitations. ICD has not fired. Patient denies other cardiac symptoms such as chest pain. Positive for claudication. Patient is compliant with fluid restriction or always taking medications as prescribed. Discussed importance of medication compliance. He reports not taking home BPs, he did not not bring his home BP cuff with him today. REVIEW OF SYSTEMS:  General: Denies fever, night sweats. Ear, nose and throat: Denies difficulty hearing, sinus problems, runny nose, post-nasal drip, ringing in ears, mouth sores, loose teeth, ear pain, nosebleeds, sore throate, facial pain or numbess  Cardiovascular: see above in the interval history  Respiratory: Positive for wheezing Denies hemoptysis. Gastrointestinal: Denies heartburn, constipation, intolerance to certain foods, diarrhea, abdominal pain, nausea, vomiting, difficulty swallowing, blood in stool  Kidney and bladder: Denies painful urination, frequent urination, urgency, prostate problems and impotence  Musculoskeletal: Denies joint pain, muscle weakness, +claudication   Skin and hair: Denies change in existing skin lesions, hair loss or increase, breast changes    PHYSICAL EXAM:  Vital signs:   Visit Vitals  /80 (BP 1 Location: Right arm, BP Patient Position: Sitting)   Pulse 73   Temp 98.4 °F (36.9 °C) (Oral)   Resp 20   Ht 5' 10\" (1.778 m)   Wt 195 lb 3.2 oz (88.5 kg)   SpO2 96%   BMI 28.01 kg/m²     General: Patient is well developed, well-nourished in no acute distress  HEENT: Normocephalic and atraumatic. No scleral icterus. Pupils are equal, round and reactive to light and accomodation. No conjunctival injection. Oropharynx is clear. Neck: Supple. No evidence of thyroid enlargements or lymphadenopathy. JVD: Cannot be appreciated   Lungs: Breath sounds are clear but diminished in posterior bases, no rhonchi, wheezing or rales. Heart: Regular rate and rhythm with normal S1 and S2. No murmurs, gallops or rubs. ICD in RU chest  Abdomen: Soft, no mass or tenderness. No organomegaly or hernia. Bowel sounds present. Genitourinary and rectal: deferred  Extremities: No cyanosis, clubbing, or edema. Neurologic: No focal sensory or motor deficits are noted. Grossly intact. Psychiatric: Awake, alert and oriented x 3. Appropriate mood and affect. Skin: Warm, dry and well perfused, flaky BLE, pink/yasir . No lesions, nodules or rashes are noted. PAST MEDICAL HISTORY:  Past Medical History:   Diagnosis Date    Arthritis     hands/fingers    CAD (coronary artery disease)     involving native coronary artery of native heart without angina pectoris    Cardiomyopathy ([facility]) 01/20/2017    A. Echo (1/19/17):  EF 5-10% with severe GHK,. Mildly dil LA. Mild TR. PASP 46./systemic cardiomyopathy    Chronic kidney disease     Chronic obstructive pulmonary disease (HCC)     Chronic renal insufficiency 03/06/2017    Chronic systolic congestive heart failure, NYHA class 3 ([facility]) 1/8/2019    Claudication ([facility]) 10/9/2018    Congestive heart failure ([facility])     Diabetes mellitus ([facility]) 3/6/2017    IDDM    Dyslipidemia 3/6/2017    A. FLP (1/19/17): Tot 120, , HDL 19, LDL 76 (no Rx).     H/O: GI bleed 3/6/2017    Hepatitis C 3/6/2017    Hypokalemia     ICD (implantable cardioverter-defibrillator) in place     Ill-defined condition     flu 1/2018     Neuropathy 11/19/2018    Noncompliance with medication regimen 9/11/2018    Paroxysmal atrial fibrillation ([facility]) 3/6/2017    Peripheral vascular disease ([facility]) 2017    A. RICKY (3/20/17): Right 0.58, Left 0.56.  Tobacco dependence syndrome 10/9/2018    Vitamin D deficiency 2018       PAST SURGICAL HISTORY:  Past Surgical History:   Procedure Laterality Date    COLONOSCOPY N/A 2017    COLONOSCOPY performed by Anil Segovia. Aldo Shook MD at [address] COLONOSCOPY N/A 2018    COLONOSCOPY performed by Anil Segovia.  Aldo Shook MD at [facility] ENDOSCOPY    HX CORONARY STENT PLACEMENT      LM, RCA x 2     HX GI      colonoscopy x 3 - last 2017 - polyp    HX IMPLANTABLE CARDIOVERTER DEFIBRILLATOR         FAMILY HISTORY:  Family History   Problem Relation Age of Onset    Hypertension Mother     Heart Disease Mother         MURMUR    Cancer Father         COLON    Colon Cancer Father     Other Sister         born totally handicapped/epileptic    Kidney Disease Sister          from renal shutdown    Heart Disease Brother     Other Brother         ?pancreatic cancer or ?pancreatitis    Cancer Maternal Uncle         toes and spread    Cancer Paternal Uncle         stomach    Heart Attack Maternal Grandfather 47    Cancer Paternal Grandfather         bone    Cancer Maternal Uncle         stomach    Cancer Maternal Uncle         lung    Anesth Problems Neg Hx        SOCIAL HISTORY:  Social History     Socioeconomic History    Marital status:      Spouse name: Not on file    Number of children: Not on file    Years of education: Not on file    Highest education level: Not on file   Tobacco Use    Smoking status: Current Some Day Smoker     Packs/day: 0.50     Years: 45.00     Pack years: 22.50     Last attempt to quit: 2017     Years since quittin.4    Smokeless tobacco: Never Used    Tobacco comment: using Nicotine patches   Substance and Sexual Activity    Alcohol use: No    Drug use: Yes     Types: Marijuana    Sexual activity: Never     Partners: Female       LABORATORY RESULTS:     Labs Latest Ref Rng & Units 2019 5/22/2019   WBC 3.4 - 10.8 x10E3/uL 8.4 -   RBC 4.14 - 5.80 x10E6/uL 5.30 -   Hemoglobin 13.0 - 17.7 g/dL 14.6 -   Hematocrit 37.5 - 51.0 % 43.0 -   MCV 79 - 97 fL 81 -   Platelets 508 - 348 X08B8/ -   Albumin 3.6 - 4.8 g/dL - 4.0   Calcium 8.6 - 10.2 mg/dL 9.2 9.3   SGOT 0 - 40 IU/L - 13   Glucose 65 - 99 mg/dL 184(H) 165(H)   BUN 8 - 27 mg/dL 22 21   Creatinine 0.76 - 1.27 mg/dL 1.46(H) 1.50(H)   Sodium 134 - 144 mmol/L 142 138   Potassium 3.5 - 5.2 mmol/L 4.4 4.7   Some recent data might be hidden     Lab Results   Component Value Date/Time    TSH 0.049 (L) 04/10/2019 12:00 AM    TSH 1.050 12/06/2018 01:56 PM    TSH 3.040 12/04/2017 10:17 AM    TSH 2.04 01/22/2017 11:11 AM    TSH 1.49 01/18/2017 11:12 PM       CURRENT MEDICATIONS:    Current Outpatient Medications:     amiodarone (CORDARONE) 200 mg tablet, Take 1 Tab by mouth two (2) times a day., Disp: 60 Tab, Rfl: 0    torsemide (DEMADEX) 20 mg tablet, Take 0.5 Tabs by mouth daily. , Disp: 180 Tab, Rfl: 1    ENTRESTO 49-51 mg tab tablet, Take 1 Tab by mouth two (2) times a day., Disp: 60 Tab, Rfl: 11    umeclidinium-vilanterol (ANORO ELLIPTA) 62.5-25 mcg/actuation inhaler, Take 1 Puff by inhalation daily. , Disp: 1 Inhaler, Rfl: 5    cholecalciferol (VITAMIN D3) 5,000 unit capsule, Take 1 Cap by mouth daily. , Disp: 30 Cap, Rfl: 2    potassium chloride (K-DUR, KLOR-CON) 20 mEq tablet, Take 1 Tab by mouth three (3) times daily. , Disp: 60 Tab, Rfl: 3    metoprolol succinate (TOPROL-XL) 100 mg tablet, Take 0.5 Tabs by mouth daily for 360 days. Indications: chronic heart failure (Patient taking differently: Take 100 mg by mouth daily. Indications: chronic heart failure), Disp: 90 Tab, Rfl: 1    atorvastatin (LIPITOR) 40 mg tablet, TAKE ONE TABLET BY MOUTH ONCE DAILY  Indications: excessive fat in the blood, Disp: 90 Tab, Rfl: 3    clopidogrel (PLAVIX) 75 mg tab, Take 1 Tab by mouth daily. , Disp: 90 Tab, Rfl: 3    insulin NPH/insulin regular (Temitope Santillan 70/30 U-100 INSULIN) 100 unit/mL (70-30) injection, INJECT 10 UNITS SUBCUTANEOUSLY IN THE MORNING AND 8 UNIT IN THE EVENING, Disp: 5 Vial, Rfl: 5    aspirin 81 mg chewable tablet, Take 81 mg by mouth daily. , Disp: , Rfl:     acetaminophen (TYLENOL EXTRA STRENGTH) 500 mg tablet, Take 1,000 mg by mouth every six (6) hours as needed for Pain. Indications: BACK PAIN, Disp: , Rfl:     Insulin Syringes, Disposable, 1 mL syrg, Pt to take 10 units w/ breakfast and 8 units w/ dinner, Disp: 500 Syringe, Rfl: 1      Thank you for your referral and allowing me to participate in this patient's care.     José Miguel Ordoñez NP  [address] [place] [address], [address]  Phone: [phone]  Fax: [phone]

## 2019-07-17 NOTE — PATIENT INSTRUCTIONS
INCREASE Entresto 97/103 twice daily - you can take 2 tablets of the 49/51 twice daily until you receive the 97/103    DECREASE Torsemide to 10mg daily    CONTINUE CURRENT medications    Take twice a day medications 12 hours apart with food    Labs today CMP, proBNP, CBC, TFT's    Follow up with GI- Dr. Nguyen Membreno and Dr. Leong Piprox foods ( flour, sugar, pasta, rice, potatoes) sodium and fluid restriction    increase physical activity     Stop smoking!! Keep a positive attitude     Follow up with [facility] in 4 weeks    HF Education: Continue daily weights (in the morning, after voiding). Notify HF team of overnight weight gains > 2 lbs or weekly >5 lbs or if any of the following Sx. Continue to limit sodium intake & monitor your fluid intake .

## 2019-07-18 ENCOUNTER — TELEPHONE (OUTPATIENT)
Dept: CARDIOLOGY CLINIC | Age: 69
End: 2019-07-18

## 2019-07-18 ENCOUNTER — DOCUMENTATION ONLY (OUTPATIENT)
Dept: CARDIOLOGY CLINIC | Age: 69
End: 2019-07-18

## 2019-07-18 DIAGNOSIS — I50.20 SYSTOLIC HEART FAILURE, ACC/AHA STAGE C (HCC): Primary | ICD-10-CM

## 2019-07-18 LAB
ALBUMIN SERPL-MCNC: 4.5 G/DL (ref 3.6–4.8)
ALBUMIN/GLOB SERPL: 1.6 {RATIO} (ref 1.2–2.2)
ALP SERPL-CCNC: 61 IU/L (ref 39–117)
ALT SERPL-CCNC: 17 IU/L (ref 0–44)
AST SERPL-CCNC: 12 IU/L (ref 0–40)
BILIRUB SERPL-MCNC: 0.6 MG/DL (ref 0–1.2)
BUN SERPL-MCNC: 39 MG/DL (ref 8–27)
BUN/CREAT SERPL: 21 (ref 10–24)
CALCIUM SERPL-MCNC: 9.6 MG/DL (ref 8.6–10.2)
CHLORIDE SERPL-SCNC: 101 MMOL/L (ref 96–106)
CO2 SERPL-SCNC: 20 MMOL/L (ref 20–29)
CREAT SERPL-MCNC: 1.82 MG/DL (ref 0.76–1.27)
ERYTHROCYTE [DISTWIDTH] IN BLOOD BY AUTOMATED COUNT: 18.3 % (ref 12.3–15.4)
GLOBULIN SER CALC-MCNC: 2.9 G/DL (ref 1.5–4.5)
GLUCOSE SERPL-MCNC: 191 MG/DL (ref 65–99)
HCT VFR BLD AUTO: 43.2 % (ref 37.5–51)
HGB BLD-MCNC: 14.4 G/DL (ref 13–17.7)
MCH RBC QN AUTO: 28.5 PG (ref 26.6–33)
MCHC RBC AUTO-ENTMCNC: 33.3 G/DL (ref 31.5–35.7)
MCV RBC AUTO: 85 FL (ref 79–97)
NT-PROBNP SERPL-MCNC: 1957 PG/ML (ref 0–376)
PLATELET # BLD AUTO: 151 X10E3/UL (ref 150–450)
POTASSIUM SERPL-SCNC: 4.3 MMOL/L (ref 3.5–5.2)
PROT SERPL-MCNC: 7.4 G/DL (ref 6–8.5)
RBC # BLD AUTO: 5.06 X10E6/UL (ref 4.14–5.8)
SODIUM SERPL-SCNC: 140 MMOL/L (ref 134–144)
T3FREE SERPL-MCNC: 2.9 PG/ML (ref 2–4.4)
T4 FREE SERPL-MCNC: 2.27 NG/DL (ref 0.82–1.77)
TSH SERPL DL<=0.005 MIU/L-ACNC: 0.13 UIU/ML (ref 0.45–4.5)
WBC # BLD AUTO: 8.8 X10E3/UL (ref 3.4–10.8)

## 2019-07-19 NOTE — TELEPHONE ENCOUNTER
I called patient, advised him per JING [place]:  Lab result creatinine is elevated at 1.8, hold the torsemide and only take for weight gain greater than 2lbs in 24hr or greater than 5 lbs in a week.  Repeat BMP in week.  Call with any questions. Stop amiodarone due to Hyperthyroid; TSH 0.129, Free T4 2.27.  Will interrogate device on next clinic visit. Ketan Aguilar TFT's in 4-6 weeks.  Patient to see endocrine; Dr. Mitra Nichole on 9/6/19. Encourage follow up with Dr. Sunny Choudhary. He states understanding of all instructions, has no questions. Lab order placed.

## 2019-07-24 ENCOUNTER — TELEPHONE (OUTPATIENT)
Dept: CARDIOLOGY CLINIC | Age: 69
End: 2019-07-24

## 2019-07-24 NOTE — TELEPHONE ENCOUNTER
Telephone Call RE:  Lab Reminder      Outcome:     [x] Patient verbalizes understanding    [] Unable to reach   [] Left message              []       Get Ojeda

## 2019-07-26 NOTE — TELEPHONE ENCOUNTER
Discussed lab results from yesterday and creatinine increased from 1.8 to 2.02. Advised pt to drink more fluids this weekend and repeat labs on Monday morning (BMP and NTproBNP) if creatinine remains elevated will decrease entresto dose. Changed torsemide to prn for SOB or weight gain.

## 2019-08-12 NOTE — TELEPHONE ENCOUNTER
Patient would like to speak with you about possibly rescheduling his appointment on 8/14 for an earlier time that day. Thanks!     Phone: [phone]

## 2019-08-14 NOTE — PROGRESS NOTES
Room # 3    Follow up requested per Mary Vargas NP ([facility]) concerned re: EKG done 7/17/19     Fatigue    Visit Vitals  /76 (BP 1 Location: Left arm, BP Patient Position: Sitting)   Pulse 76   Resp 18   Ht 5' 10\" (1.778 m)   Wt 201 lb 6.4 oz (91.4 kg)   SpO2 95%   BMI 28.90 kg/m²

## 2019-08-14 NOTE — PROGRESS NOTES
HISTORY OF PRESENTING ILLNESS      Estil F Theodis Kawasaki. is a 79 y.o. male with ICM, LVEF 25%, CKD, PAF (Hx GIB), DM, Hepatitis C, CAD s/p  PCI, single chamber [employer] ICD presenting for follow-up to discuss possible EKG changes on a recent EKG performed during a heart failure clinic visit. His EKG showed sinus rhythm with an incomplete left bundle branch block. Previous EKGs show similar findings. His amiodarone was discontinued recently. He had been on amiodarone for atrial fibrillation. He denies worsened fatigue, palpitations, lower extremity edema. Device interrogation fails to reveal recurrent tachycardia.        ACTIVE PROBLEM LIST     Patient Active Problem List    Diagnosis Date Noted    Vitamin deficiency 01/29/2019    Chronic systolic congestive heart failure, NYHA class 3 ([address] 01/08/2019    Type 2 diabetes mellitus with diabetic neuropathy ([address] 12/06/2018    Neuropathy 11/19/2018    Claudication ([address] 10/09/2018    Tobacco dependence syndrome 10/09/2018    Noncompliance with medication regimen 09/11/2018    Proteinuria 06/06/2018    Vitamin D deficiency 06/04/2018    Polyp of colon 03/05/2018    Advance care planning 03/05/2018    Type 2 diabetes mellitus with nephropathy ([address] 01/11/2018    Hypokalemia 01/11/2018    ICD (implantable cardioverter-defibrillator) in place 12/04/2017    Peripheral vascular disease ([address] 09/18/2017    Coronary artery disease involving native heart without angina pectoris 23/11/8646    Systolic heart failure, ACC/AHA stage C ([address] 08/17/2017    Type 2 diabetes mellitus with complication ([address] 27/96/2880    Paroxysmal atrial fibrillation ([address] 03/06/2017    H/O: GI bleed 03/06/2017    Hepatitis C 03/06/2017    Chronic renal insufficiency 03/06/2017    Dyslipidemia 03/06/2017    Ischemic cardiomyopathy 01/20/2017    NAVARRO (dyspnea on exertion) 01/19/2017           PAST MEDICAL HISTORY     Past Medical History:   Diagnosis Date  Arthritis     hands/fingers    CAD (coronary artery disease)     involving native coronary artery of native heart without angina pectoris    Cardiomyopathy ([facility] 01/20/2017    A. Echo (1/19/17):  EF 5-10% with severe GHK,. Mildly dil LA. Mild TR. PASP 46./systemic cardiomyopathy    Chronic kidney disease     Chronic obstructive pulmonary disease (HCC)     Chronic renal insufficiency 03/06/2017    Chronic systolic congestive heart failure, NYHA class 3 ([address] 1/8/2019    Claudication ([address] 10/9/2018    Congestive heart failure ([address]     Diabetes mellitus ([address] 3/6/2017    IDDM    Dyslipidemia 3/6/2017    A. FLP (1/19/17): Tot 120, , HDL 19, LDL 76 (no Rx).  H/O: GI bleed 3/6/2017    Hepatitis C 3/6/2017    Hypokalemia     ICD (implantable cardioverter-defibrillator) in place     Ill-defined condition     flu 1/2018     Neuropathy 11/19/2018    Noncompliance with medication regimen 9/11/2018    Paroxysmal atrial fibrillation ([address] 3/6/2017    Peripheral vascular disease ([address] 9/18/2017    A. RICKY (3/20/17): Right 0.58, Left 0.56.  Tobacco dependence syndrome 10/9/2018    Vitamin D deficiency 6/4/2018           PAST SURGICAL HISTORY     Past Surgical History:   Procedure Laterality Date    COLONOSCOPY N/A 2/17/2017    COLONOSCOPY performed by Minor Older. Nguyen Membreno MD at [address] COLONOSCOPY N/A 2/5/2018    COLONOSCOPY performed by Minor Older. Nguyen Membreno MD at [facility] ENDOSCOPY    HX CORONARY STENT PLACEMENT      LM, RCA x 2     HX GI      colonoscopy x 3 - last 2/2017 - polyp    HX IMPLANTABLE CARDIOVERTER DEFIBRILLATOR            ALLERGIES     Allergies   Allergen Reactions    Heparin (Porcine) Other (comments)     Was told when in the hospital that if he received heparin again that it would be deadly.           FAMILY HISTORY     Family History   Problem Relation Age of Onset    Hypertension Mother     Heart Disease Mother         MURMUR    Cancer Father         COLON    Colon Cancer Father     Other Sister         born totally handicapped/epileptic    Kidney Disease Sister          from renal shutdown    Heart Disease Brother     Other Brother         ?pancreatic cancer or ?pancreatitis    Cancer Maternal Uncle         toes and spread    Cancer Paternal Uncle         stomach    Heart Attack Maternal Grandfather 47    Cancer Paternal Grandfather         bone    Cancer Maternal Uncle         stomach    Cancer Maternal Uncle         lung    Anesth Problems Neg Hx     negative for cardiac disease       SOCIAL HISTORY     Social History     Socioeconomic History    Marital status:      Spouse name: Not on file    Number of children: Not on file    Years of education: Not on file    Highest education level: Not on file   Tobacco Use    Smoking status: Current Some Day Smoker     Packs/day: 0.50     Years: 45.00     Pack years: 22.50     Last attempt to quit: 2017     Years since quittin.5    Smokeless tobacco: Never Used    Tobacco comment: using Nicotine patches   Substance and Sexual Activity    Alcohol use: No    Drug use: Yes     Types: Marijuana    Sexual activity: Never     Partners: Female         MEDICATIONS     Current Outpatient Medications   Medication Sig    cholecalciferol (VITAMIN D3) 5,000 unit capsule Take 1 Cap by mouth daily.  torsemide (DEMADEX) 20 mg tablet Take 0.5 Tabs by mouth as needed (SOB or weight >2lbs).  sacubitril-valsartan (ENTRESTO) 97 mg/103 mg tablet Take 1 Tab by mouth two (2) times a day.  umeclidinium-vilanterol (ANORO ELLIPTA) 62.5-25 mcg/actuation inhaler Take 1 Puff by inhalation daily.  potassium chloride (K-DUR, KLOR-CON) 20 mEq tablet Take 1 Tab by mouth three (3) times daily. (Patient taking differently: Take 20 mEq by mouth two (2) times a day.)    metoprolol succinate (TOPROL-XL) 100 mg tablet Take 0.5 Tabs by mouth daily for 360 days.  Indications: chronic heart failure    atorvastatin (LIPITOR) 40 mg tablet TAKE ONE TABLET BY MOUTH ONCE DAILY  Indications: excessive fat in the blood    insulin NPH/insulin regular (NOVOLIN 70/30 U-100 INSULIN) 100 unit/mL (70-30) injection INJECT 10 UNITS SUBCUTANEOUSLY IN THE MORNING AND 8 UNIT IN THE EVENING (Patient taking differently: INJECT 80  UNITS SUBCUTANEOUSLY IN THE MORNING AND 20 UNITS IN THE EVENING)    aspirin 81 mg chewable tablet Take 81 mg by mouth daily.  acetaminophen (TYLENOL EXTRA STRENGTH) 500 mg tablet Take 1,000 mg by mouth every six (6) hours as needed for Pain. Indications: BACK PAIN    Insulin Syringes, Disposable, 1 mL syrg Pt to take 10 units w/ breakfast and 8 units w/ dinner     No current facility-administered medications for this visit. I have reviewed the nurses notes, vitals, problem list, allergy list, medical history, family, social history and medications. REVIEW OF SYMPTOMS      General: Pt denies excessive weight gain or loss. Pt is able to conduct ADL's  HEENT: Denies blurred vision, headaches, hearing loss, epistaxis and difficulty swallowing. Respiratory: Denies cough, congestion, shortness of breath, NAVARRO, wheezing or stridor. Cardiovascular: Denies precordial pain, palpitations, edema or PND  Gastrointestinal: Denies poor appetite, indigestion, abdominal pain or blood in stool  Genitourinary: Denies hematuria, dysuria, increased urinary frequency  Musculoskeletal: Denies joint pain or swelling from muscles or joints  Neurologic: Denies tremor, paresthesias, headache, or sensory motor disturbance  Psychiatric: Denies confusion, insomnia, depression  Integumentray: Denies rash, itching or ulcers. Hematologic: Denies easy bruising, bleeding       PHYSICAL EXAMINATION      Vitals:    08/14/19 1343   BP: 132/76   Pulse: 76   Resp: 18   SpO2: 95%   Weight: 201 lb 6.4 oz (91.4 kg)   Height: 5' 10\" (1.778 m)     General: Well developed, in no acute distress.   HEENT: No jaundice, oral mucosa moist, no oral ulcers  Neck: Supple, no stiffness, no lymphadenopathy, supple  Heart:  Normal S1/S2 negative S3 or S4. Regular, no murmur, gallop or rub, no jugular venous distention  Respiratory: Clear bilaterally x 4, no wheezing or rales  Abdomen:   Soft, non-tender, bowel sounds are active.   Extremities:  No edema, normal cap refill, no cyanosis. Musculoskeletal: No clubbing, no deformities  Neuro: A&Ox3, speech clear, gait stable, cooperative, no focal neurologic deficits  Skin: Skin color is normal. No rashes or lesions. Non diaphoretic, moist.  Vascular: 2+ pulses symmetric in all extremities       DIAGNOSTIC DATA      EKG:        LABORATORY DATA      Lab Results   Component Value Date/Time    WBC 8.8 07/17/2019 12:00 AM    HGB 14.4 07/17/2019 12:00 AM    HCT 43.2 07/17/2019 12:00 AM    PLATELET 118 25/43/0701 12:00 AM    MCV 85 07/17/2019 12:00 AM      Lab Results   Component Value Date/Time    Sodium 142 07/29/2019 12:00 AM    Potassium 4.8 07/29/2019 12:00 AM    Chloride 103 07/29/2019 12:00 AM    CO2 23 07/29/2019 12:00 AM    Anion gap 7 04/04/2019 01:44 PM    Glucose 151 (H) 07/29/2019 12:00 AM    BUN 30 (H) 07/29/2019 12:00 AM    Creatinine 1.83 (H) 07/29/2019 12:00 AM    BUN/Creatinine ratio 16 07/29/2019 12:00 AM    GFR est AA 43 (L) 07/29/2019 12:00 AM    GFR est non-AA 38 (L) 07/29/2019 12:00 AM    Calcium 9.4 07/29/2019 12:00 AM    Bilirubin, total 0.6 07/17/2019 12:00 AM    AST (SGOT) 12 07/17/2019 12:00 AM    Alk. phosphatase 61 07/17/2019 12:00 AM    Protein, total 7.4 07/17/2019 12:00 AM    Albumin 4.5 07/17/2019 12:00 AM    Globulin 4.4 (H) 02/22/2017 03:16 AM    A-G Ratio 1.6 07/17/2019 12:00 AM    ALT (SGPT) 17 07/17/2019 12:00 AM           ASSESSMENT      1. Cardiomyopathy                          N. Ischemic                        B. NYHA class II  2. Atrial fibrillation                        A. Paroxysmal  3. CAD, native  4. Percutaneous transluminal angioplasty  5. ICD                         [address]                        J. Single chamber  6. History of GI bleed  7. Incomplete LBBB       PLAN     At this time upgrade of patient's single-chamber ICD to a CRT-D system is not warranted. However will discuss with patient the option of left atrial appendage occlusion. FOLLOW-UP     1 year      Thank you, Savanna Mchugh MD and Dr. Liz Mcintyre for allowing me to participate in the care of this extraordinarily pleasant male. Please do not hesitate to contact me for further questions/concerns.          Erlin Brantley MD  Cardiac Electrophysiology / Cardiology    [address]  [address]  [phone] / [phone] Fax   [phone] / [phone] Fax

## 2019-08-16 NOTE — TELEPHONE ENCOUNTER
Called patient to discuss watchman procedure to reduce stroke risk. He is interested in discuss the procedure further. Will forward to scheduling.     Mary Saenz, MONICA

## 2019-08-20 NOTE — PATIENT INSTRUCTIONS
Medication changes:    TONIGHT Take an extra dose of torsemide- Half a tablet by mouth    TOMORROW Take a full tablet of torsemide by mouth THEN RESUME your regular dose of torsemide half a tablet daily    It is okay for you to start a nicotine patch from a cardiac standpoint if you would like to. Discuss with your primary care provider. Testing Ordered:    Lab work in clinic today (CMP, Magnesium, Vitamin D, NT Pro-BNP)    Please call our office with an update on your weight this Friday (8/23/19)     Follow up 3-4 weeks with Lynn Larson NP      Please monitor your blood pressures daily prior to medications and 2 hours after taking medications. Bring a written record of your blood pressures to your next appointment. Please monitor your weights daily upon waking and after using the bathroom. Keep a written records of your weights and bring to your next appointment. If you have a weight gain of 2 or more pounds overnight OR 5 or more pounds in one week, please contact our office. Stopping Smoking: Care Instructions  Your Care Instructions  Cigarette smokers crave the nicotine in cigarettes. Giving it up is much harder than simply changing a habit. Your body has to stop craving the nicotine. It is hard to quit, but you can do it. There are many tools that people use to quit smoking. You may find that combining tools works best for you. There are several steps to quitting. First you get ready to quit. Then you get support to help you. After that, you learn new skills and behaviors to become a nonsmoker. For many people, a necessary step is getting and using medicine. Your doctor will help you set up the plan that best meets your needs. You may want to attend a smoking cessation program to help you quit smoking. When you choose a program, look for one that has proven success. Ask your doctor for ideas.  You will greatly increase your chances of success if you take medicine as well as get counseling or join a cessation program.  Some of the changes you feel when you first quit tobacco are uncomfortable. Your body will miss the nicotine at first, and you may feel short-tempered and grumpy. You may have trouble sleeping or concentrating. Medicine can help you deal with these symptoms. You may struggle with changing your smoking habits and rituals. The last step is the tricky one: Be prepared for the smoking urge to continue for a time. This is a lot to deal with, but keep at it. You will feel better. Follow-up care is a key part of your treatment and safety. Be sure to make and go to all appointments, and call your doctor if you are having problems. It's also a good idea to know your test results and keep a list of the medicines you take. How can you care for yourself at home? · Ask your family, friends, and coworkers for support. You have a better chance of quitting if you have help and support. · Join a support group, such as Nicotine Anonymous, for people who are trying to quit smoking. · Consider signing up for a smoking cessation program, such as the American Lung Association's Freedom from Smoking program.  · Get text messaging support. Go to the website at www.smokefree. gov to sign up for the [facility] program.  · Set a quit date. Pick your date carefully so that it is not right in the middle of a big deadline or stressful time. Once you quit, do not even take a puff. Get rid of all ashtrays and lighters after your last cigarette. Clean your house and your clothes so that they do not smell of smoke. · Learn how to be a nonsmoker. Think about ways you can avoid those things that make you reach for a cigarette. ? Avoid situations that put you at greatest risk for smoking. For some people, it is hard to have a drink with friends without smoking. For others, they might skip a coffee break with coworkers who smoke. ? Change your daily routine.  Take a different route to work or eat a meal in a different place. · Cut down on stress. Calm yourself or release tension by doing an activity you enjoy, such as reading a book, taking a hot bath, or gardening. · Talk to your doctor or pharmacist about nicotine replacement therapy, which replaces the nicotine in your body. You still get nicotine but you do not use tobacco. Nicotine replacement products help you slowly reduce the amount of nicotine you need. These products come in several forms, many of them available over-the-counter:  ? Nicotine patches  ? Nicotine gum and lozenges  ? Nicotine inhaler  · Ask your doctor about bupropion (Wellbutrin) or varenicline (Chantix), which are prescription medicines. They do not contain nicotine. They help you by reducing withdrawal symptoms, such as stress and anxiety. · Some people find hypnosis, acupuncture, and massage helpful for ending the smoking habit. · Eat a healthy diet and get regular exercise. Having healthy habits will help your body move past its craving for nicotine. · Be prepared to keep trying. Most people are not successful the first few times they try to quit. Do not get mad at yourself if you smoke again. Make a list of things you learned and think about when you want to try again, such as next week, next month, or next year. Where can you learn more? Go to http://erik-dianna.info/. Enter Z561 in the search box to learn more about \"Stopping Smoking: Care Instructions. \"  Current as of: September 26, 2018  Content Version: 12.1  © 0361-9346 [employer], [employer]. Care instructions adapted under license by [employer] (which disclaims liability or warranty for this information). If you have questions about a medical condition or this instruction, always ask your healthcare professional. [address] any warranty or liability for your use of this information.

## 2019-08-20 NOTE — PROGRESS NOTES
[facility] in [facility], [address] [facility] Note    Patient name: Saba Lua.   Patient : 1950  Patient MRN: [id number]  Date of service: 19    Primary care physician: Claire Ramos MD  Primary cardiologist: Dr. Lucinda Johnson:  Chief Complaint   Patient presents with    CHF       PLAN:  Continue current medical therapy for heart failure  Continue current dose of toprol XL 50mg daily  Continue Entresto 97/103 mg twice daily  Cannot tolerate aldactone due to hyperkalemia; not interested in taking veltassa  Increase torsemide to10mg daily, take additional 10mg prn for SOB/edema/weight gain  Encourage albuterol inhaler use prn for SOB/wheezing  OK to start nicotine patches per PCP   Continue ASA, plavix and statin for CAD  Stopped amiodarone due to Hyperthyroidism, check thyroid profile every 3 months and PFT/DLCO every year   ICD interrogation last check 18, follow-up with Dr. Caryle Kitten  ECHO  19- LVEF 21-25%, LVGH, mild MR, mild TR, mild pulm HTN, est PA 45mmHg, LVIDd 5.32cm, Tapse 2.38cm  Labs today: CBC, CMP, pro-NT-BNP, mag  Reinforced low salt diet, not adhering to low Na+ diet  Reinforced fluid restriction to 6 x 8oz glasses per day  Discussed tobacco cessation, currently smoking 1ppd  Advanced care plan present on the chart  Counseled to obtain home scale and arm blood pressure cuff  Document in diary BP/HR before and 2 hours after taking medications and daily weights  Recommend flu vaccination annually and pneumonia vaccine every 5 years  Follow up with Endocrinologist, Dr. Benny Runner rescheduled/ cancelled original appointment for 19  Patient declines referral with vascular surgery for PAD  Follow-up with GI on h/o GI bleed and colorectal surgery, Dr. Grady Figueroa, repeat EGD/colosocopy wants date to stop Plavix  Follow-up with primary cardiologist, Dr. Mackenzie Finley  Follow-up with EP cardiologist, Dr. Caryle Kitten for follow up potential watchmen procedure  Follow-up with PCP, Dr. Tejal Dunham, for diabetes management; patient declined Jardiance  Patient declined referral to pulmonary for COPD and sleep study  Return to [facility] in 4 weeks with NP/MD    IMPRESSION:  Chronic systolic heart failure  Stage C, NYHA class II symptoms  Coronary artery disease   S/p impella assisted high risk PCI with MIKE to 100% p-RCA and 70% LM 2/9/17 by Dr. Adeel Robison  Ischemic cardiomyopathy, LVEF 25%  HTN  PAD not amenable to PTCA  Incomplete LBBB  CKD3  KELLY  COPD  S/p GI bleed and colorectal surgery  Medical noncompliance  Claudication  PAD  Hepatitis C    CARDIAC IMAGING:  Echo (7/11/18) LVEF 25%, akinesis of basal-mid inferior wall. PA pressure 42mmHg. ECHO (7/11/19) 21-25%, LVGH, mild MR, mild TR, mild pulm HTN, est PA 45mmHg, LVIDd 5.32cm, Tapse 2.38cm  ICD interrogation (12/6/18) no events, normal device function, good batteries    HEMODYNAMICS:  RHC not done  CPEST not done  6MW not done    HISTORY OF PRESENT ILLNESS:  I had the pleasure of seeing Saba Gibson in [facility] at [address] in [address] [facility]. Briefly, Saba Gibson is a 79 y.o. male with h/o CAD s/p PCI of LM,  of RCA, ischemic cardiomyopathy with LVEF 32%, s/p AICD complicated by  PAF (amiodarone), Hep C, GI bleed (s/p colorectal surgery), current tobacco abuse and PAD. INTERVAL HISTORY:  Today, patient presents for routine clinic visit. Patient is doing better. Continues to smoke >1ppd of cigarettes, attributes this to increased life stressors. Reports couple of recent family deaths. Denies symptoms of presyncope, syncope, lightheadedness, dizziness, orthopnea, PND, nocturia, CP, palpitations, or SOB. Patient walked to our clinic from parking lot without having to slow down or stop. Patient can walk more than one block without symptoms of fatigue or shortness of breath.  Patient can perform home activities without a problem and routinely participates in daily walking for more than 15 minutes. He reports NAVARRO with fast walking as the only time he is dyspneic or while doing yard work. Patient denies symptoms of volume overload, although his has significant abdominal fat he attributes to overeating. He reports increased appetite and reports dietary discrepancies  attributing his 10lbs weight gain over the last 3 months. His weight has not changed from the previous clinic visit. Patient denies irregular heart rate or palpitations. ICD has not fired. Patient denies other cardiac symptoms such as chest pain. Positive for claudication. Patient is not compliant with fluid restriction or always taking medications as prescribed. Discussed importance of medication compliance. He reports not taking home BPs, he did not not bring his home BP cuff with him today. REVIEW OF SYSTEMS:  General: Denies fever, night sweats. Ear, nose and throat: Denies difficulty hearing, sinus problems, runny nose, post-nasal drip, ringing in ears, mouth sores, loose teeth, ear pain, nosebleeds, sore throate, facial pain or numbess  Cardiovascular: see above in the interval history  Respiratory: Positive for wheezing, non productive cough, Denies hemoptysis.   Gastrointestinal: Denies heartburn, constipation, intolerance to certain foods, diarrhea, abdominal pain, nausea, vomiting, difficulty swallowing, blood in stool  Kidney and bladder: Denies painful urination, frequent urination, urgency, prostate problems and impotence  Musculoskeletal: Denies joint pain, muscle weakness, +claudication   Skin and hair: Denies change in existing skin lesions, hair loss or increase, breast changes    PHYSICAL EXAM:  Vital signs:   Visit Vitals  /64 (BP 1 Location: Left arm, BP Patient Position: Sitting)   Pulse 62   Temp 97.8 °F (36.6 °C) (Oral)   Resp 24   Ht 5' 10\" (1.778 m)   Wt 202 lb 3.2 oz (91.7 kg)   SpO2 98%   BMI 29.01 kg/m²     General: Patient is well developed, well-nourished in no acute distress  HEENT: Normocephalic and atraumatic. No scleral icterus. Pupils are equal, round and reactive to light and accomodation. No conjunctival injection. Oropharynx is clear. Neck: Supple. No evidence of thyroid enlargements or lymphadenopathy. JVD: Cannot be appreciated   Lungs: Scattered wheezing throughout, crackles in posterior bases,  no rhonchi or rales. Heart: Regular rate and rhythm with normal S1 and S2. No murmurs, gallops or rubs. ICD in RU chest  Abdomen: Soft, obese, rotund, no mass or tenderness. No organomegaly or hernia. Bowel sounds present. Genitourinary and rectal: deferred  Extremities: No cyanosis, clubbing, or edema. flaky BLE, pink/yasir   Neurologic: No focal sensory or motor deficits are noted. Grossly intact. Psychiatric: Awake, alert and oriented x 3. Appropriate mood and + flat affect. Skin: Warm, dry and well perfused, No lesions, nodules or rashes are noted. PAST MEDICAL HISTORY:  Past Medical History:   Diagnosis Date    Arthritis     hands/fingers    CAD (coronary artery disease)     involving native coronary artery of native heart without angina pectoris    Cardiomyopathy ([address] 01/20/2017    A. Echo (1/19/17):  EF 5-10% with severe GHK,. Mildly dil LA. Mild TR. PASP 46./systemic cardiomyopathy    Chronic kidney disease     Chronic obstructive pulmonary disease (HCC)     Chronic renal insufficiency 03/06/2017    Chronic systolic congestive heart failure, NYHA class 3 ([address] 1/8/2019    Claudication ([address] 10/9/2018    Congestive heart failure ([address]     Diabetes mellitus ([address] 3/6/2017    IDDM    Dyslipidemia 3/6/2017    A. FLP (1/19/17): Tot 120, , HDL 19, LDL 76 (no Rx).     H/O: GI bleed 3/6/2017    Hepatitis C 3/6/2017    Hypokalemia     ICD (implantable cardioverter-defibrillator) in place     Ill-defined condition     flu 1/2018     Neuropathy 11/19/2018    Noncompliance with medication regimen 2018    Paroxysmal atrial fibrillation ([facility] 3/6/2017    Peripheral vascular disease ([facility] 2017    A. RICKY (3/20/17): Right 0.58, Left 0.56.  Tobacco dependence syndrome 10/9/2018    Vitamin D deficiency 2018       PAST SURGICAL HISTORY:  Past Surgical History:   Procedure Laterality Date    COLONOSCOPY N/A 2017    COLONOSCOPY performed by Jennifer Gutierrez. Maikel North MD at [facility] COLONOSCOPY N/A 2018    COLONOSCOPY performed by Jennifer Gutierrez.  Maikel North MD at [facility] ENDOSCOPY    HX CORONARY STENT PLACEMENT      LM, RCA x 2     HX GI      colonoscopy x 3 - last 2017 - polyp    HX IMPLANTABLE CARDIOVERTER DEFIBRILLATOR         FAMILY HISTORY:  Family History   Problem Relation Age of Onset    Hypertension Mother     Heart Disease Mother         MURMUR    Cancer Father         COLON    Colon Cancer Father     Other Sister         born totally handicapped/epileptic    Kidney Disease Sister          from renal shutdown    Heart Disease Brother     Other Brother         ?pancreatic cancer or ?pancreatitis    Cancer Maternal Uncle         toes and spread    Cancer Paternal Uncle         stomach    Heart Attack Maternal Grandfather 47    Cancer Paternal Grandfather         bone    Cancer Maternal Uncle         stomach    Cancer Maternal Uncle         lung    Anesth Problems Neg Hx        SOCIAL HISTORY:  Social History     Socioeconomic History    Marital status:      Spouse name: Not on file    Number of children: Not on file    Years of education: Not on file    Highest education level: Not on file   Tobacco Use    Smoking status: Current Some Day Smoker     Packs/day: 0.50     Years: 45.00     Pack years: 22.50     Last attempt to quit: 2017     Years since quittin.5    Smokeless tobacco: Never Used    Tobacco comment: using Nicotine patches   Substance and Sexual Activity    Alcohol use: No    Drug use: Yes     Types: Marijuana    Sexual activity: Never     Partners: Female       LABORATORY RESULTS:     Labs Latest Ref Rng & Units 7/29/2019 7/25/2019 7/17/2019 6/26/2019   WBC 3.4 - 10.8 x10E3/uL - - 8.8 8.4   RBC 4.14 - 5.80 x10E6/uL - - 5.06 5.30   Hemoglobin 13.0 - 17.7 g/dL - - 14.4 14.6   Hematocrit 37.5 - 51.0 % - - 43.2 43.0   MCV 79 - 97 fL - - 85 81   Platelets 243 - 856 S49I8/GD - - 151 152   Albumin 3.6 - 4.8 g/dL - - 4.5 -   Calcium 8.6 - 10.2 mg/dL 9.4 9.9 9.6 9.2   SGOT 0 - 40 IU/L - - 12 -   Glucose 65 - 99 mg/dL 151(H) 223(H) 191(H) 184(H)   BUN 8 - 27 mg/dL 30(H) 38(H) 39(H) 22   Creatinine 0.76 - 1.27 mg/dL 1.83(H) 2.02(H) 1.82(H) 1.46(H)   Sodium 134 - 144 mmol/L 142 140 140 142   Potassium 3.5 - 5.2 mmol/L 4.8 4.3 4.3 4.4   TSH 0.450 - 4.500 uIU/mL - - 0.129(L) -   Some recent data might be hidden     Lab Results   Component Value Date/Time    TSH 0.129 (L) 07/17/2019 12:00 AM    TSH 0.049 (L) 04/10/2019 12:00 AM    TSH 1.050 12/06/2018 01:56 PM    TSH 3.040 12/04/2017 10:17 AM    TSH 2.04 01/22/2017 11:11 AM    TSH 1.49 01/18/2017 11:12 PM       CURRENT MEDICATIONS:    Current Outpatient Medications:     cholecalciferol (VITAMIN D3) 5,000 unit capsule, Take 1 Cap by mouth daily. , Disp: 30 Cap, Rfl: 2    torsemide (DEMADEX) 20 mg tablet, Take 0.5 Tabs by mouth as needed (SOB or weight >2lbs). , Disp: 180 Tab, Rfl: 1    sacubitril-valsartan (ENTRESTO) 97 mg/103 mg tablet, Take 1 Tab by mouth two (2) times a day., Disp: 180 Tab, Rfl: 3    umeclidinium-vilanterol (ANORO ELLIPTA) 62.5-25 mcg/actuation inhaler, Take 1 Puff by inhalation daily. , Disp: 1 Inhaler, Rfl: 5    potassium chloride (K-DUR, KLOR-CON) 20 mEq tablet, Take 1 Tab by mouth three (3) times daily. (Patient taking differently: Take 20 mEq by mouth two (2) times a day.), Disp: 60 Tab, Rfl: 3    metoprolol succinate (TOPROL-XL) 100 mg tablet, Take 0.5 Tabs by mouth daily for 360 days.  Indications: chronic heart failure, Disp: 90 Tab, Rfl: 1    atorvastatin (LIPITOR) 40 mg tablet, TAKE ONE TABLET BY MOUTH ONCE DAILY  Indications: excessive fat in the blood, Disp: 90 Tab, Rfl: 3    insulin NPH/insulin regular (NOVOLIN 70/30 U-100 INSULIN) 100 unit/mL (70-30) injection, INJECT 10 UNITS SUBCUTANEOUSLY IN THE MORNING AND 8 UNIT IN THE EVENING (Patient taking differently: INJECT 80  UNITS SUBCUTANEOUSLY IN THE MORNING AND 20 UNITS IN THE EVENING), Disp: 5 Vial, Rfl: 5    aspirin 81 mg chewable tablet, Take 81 mg by mouth daily. , Disp: , Rfl:     acetaminophen (TYLENOL EXTRA STRENGTH) 500 mg tablet, Take 1,000 mg by mouth every six (6) hours as needed for Pain. Indications: BACK PAIN, Disp: , Rfl:     Insulin Syringes, Disposable, 1 mL syrg, Pt to take 10 units w/ breakfast and 8 units w/ dinner, Disp: 500 Syringe, Rfl: 1      Thank you for your referral and allowing me to participate in this patient's care.     Branden Samano NP  [address] [place] [address], [address] [facility]  Phone: [phone]  Fax: [phone]

## 2019-08-26 NOTE — TELEPHONE ENCOUNTER
Contacted patient for status update of weight per JING Diaz NP. Per patient his weight today is 198lb on his home scale. He stated his normal weight is about \"912-200HJ. \" he denies any swelling or shortness of breath. Patient was advised to take increased torsemide dosage last week, but reported no significant change in weight with the increased dose. Informed patient will notify provider and contact him with any further recommendations. Poornima Jeffers RN.

## 2019-08-27 NOTE — TELEPHONE ENCOUNTER
Recent labs and weights reviewed with JAN Peacock NP. Rosalba Yeung NP recommended V.O.R.B patient continue current dose of torsemide 10mg by mouth daily, continue to monitor daily weights and contact our office with changes, and have a repeat BMP and NT ProBNP drawn next week. Orders placed. Contacted patient to notify. Patient reported weight today was 194lbs. He denied symptoms of dizziness, lightheadedness, shortness of breath, or swelling. Patient was advised to continue to monitor daily weights and contact our office if he has a 2-3lb weight gain overnight or 5lb weight gain in a week, if he notices a significant decrease in weight, or if he develops any of the aforementioned symptoms. Patient encouraged to limit salt intake and monitor fluid intake. Patient verbalized understanding and had no further questions. He confirmed he will go to lab at [facility] next week for repeat labs. Lab orders faxed. Ivette Fregoso RN.

## 2019-09-04 NOTE — PROGRESS NOTES
HISTORY OF PRESENTING ILLNESS      Fernanda Rey is a 79 y.o. male with ICM, LVEF 25%, CKD, PAF (Hx GIB), DM, Hepatitis C, CAD s/p  PCI, single chamber [employer] ICD presenting for follow-up to discuss possible EKG changes on a recent EKG performed during a heart failure clinic visit. His EKG showed sinus rhythm with an incomplete left bundle branch block. Previous EKGs show similar findings. His amiodarone was discontinued recently. He had been on amiodarone for atrial fibrillation. He has a history of GI bleeding in the past which is precluded anticoagulation.        ACTIVE PROBLEM LIST     Patient Active Problem List    Diagnosis Date Noted    Vitamin deficiency 01/29/2019    Chronic systolic congestive heart failure, NYHA class 3 ([address] 01/08/2019    Type 2 diabetes mellitus with diabetic neuropathy ([address] 12/06/2018    Neuropathy 11/19/2018    Claudication ([address] 10/09/2018    Tobacco dependence syndrome 10/09/2018    Noncompliance with medication regimen 09/11/2018    Proteinuria 06/06/2018    Vitamin D deficiency 06/04/2018    Polyp of colon 03/05/2018    Advance care planning 03/05/2018    Type 2 diabetes mellitus with nephropathy ([address] 01/11/2018    Hypokalemia 01/11/2018    ICD (implantable cardioverter-defibrillator) in place 12/04/2017    Peripheral vascular disease ([address] 09/18/2017    Coronary artery disease involving native heart without angina pectoris 35/84/5861    Systolic heart failure, ACC/AHA stage C ([address] 08/17/2017    Type 2 diabetes mellitus with complication ([address] 90/60/4953    Paroxysmal atrial fibrillation ([address] 03/06/2017    H/O: GI bleed 03/06/2017    Hepatitis C 03/06/2017    Chronic renal insufficiency 03/06/2017    Dyslipidemia 03/06/2017    Ischemic cardiomyopathy 01/20/2017    NAVARRO (dyspnea on exertion) 01/19/2017           PAST MEDICAL HISTORY     Past Medical History:   Diagnosis Date    Arthritis     hands/fingers    CAD (coronary artery disease)     involving native coronary artery of native heart without angina pectoris    Cardiomy[facility] 01/20/2017    A. Echo (1/19/17):  EF 5-10% with severe GHK,. Mildly dil LA. Mild TR. PASP 46./systemic cardiomyopathy    Chronic kidney disease     Chronic obstructive pulmonary disease (HCC)     Chronic renal insufficiency 03/06/2017    Chronic systolic congestive heart failure, NYHA class 3 ([address] 1/8/2019    Claudication ([address] 10/9/2018    Congestive heart failure ([address]     Diabetes mellitus ([address] 3/6/2017    IDDM    Dyslipidemia 3/6/2017    A. FLP (1/19/17): Tot 120, , HDL 19, LDL 76 (no Rx).  H/O: GI bleed 3/6/2017    Hepatitis C 3/6/2017    Hypokalemia     ICD (implantable cardioverter-defibrillator) in place     Ill-defined condition     flu 1/2018     Neuropathy 11/19/2018    Noncompliance with medication regimen 9/11/2018    Paroxysmal atrial fibrillation ([address] 3/6/2017    Peripheral vascular disease ([address] 9/18/2017    A. RICKY (3/20/17): Right 0.58, Left 0.56.  Tobacco dependence syndrome 10/9/2018    Vitamin D deficiency 6/4/2018           PAST SURGICAL HISTORY     Past Surgical History:   Procedure Laterality Date    COLONOSCOPY N/A 2/17/2017    COLONOSCOPY performed by Ramon Bliss. Jeanette Franco MD at [address] COLONOSCOPY N/A 2/5/2018    COLONOSCOPY performed by Ramon Bliss. Jeanette Franco MD at [facility] ENDOSCOPY    HX CORONARY STENT PLACEMENT      LM, RCA x 2     HX GI      colonoscopy x 3 - last 2/2017 - polyp    HX IMPLANTABLE CARDIOVERTER DEFIBRILLATOR            ALLERGIES     Allergies   Allergen Reactions    Heparin (Porcine) Other (comments)     Was told when in the hospital that if he received heparin again that it would be deadly.           FAMILY HISTORY     Family History   Problem Relation Age of Onset    Hypertension Mother     Heart Disease Mother         MURMUR    Cancer Father         COLON    Colon Cancer Father     Other Sister born totally handicapped/epileptic    Kidney Disease Sister          from renal shutdown    Heart Disease Brother     Other Brother         ?pancreatic cancer or ?pancreatitis    Cancer Maternal Uncle         toes and spread    Cancer Paternal Uncle         stomach    Heart Attack Maternal Grandfather 47    Cancer Paternal Grandfather         bone    Cancer Maternal Uncle         stomach    Cancer Maternal Uncle         lung    Anesth Problems Neg Hx     negative for cardiac disease       SOCIAL HISTORY     Social History     Socioeconomic History    Marital status:      Spouse name: Not on file    Number of children: Not on file    Years of education: Not on file    Highest education level: Not on file   Tobacco Use    Smoking status: Current Some Day Smoker     Packs/day: 0.50     Years: 45.00     Pack years: 22.50     Last attempt to quit: 2017     Years since quittin.6    Smokeless tobacco: Never Used    Tobacco comment: using Nicotine patches   Substance and Sexual Activity    Alcohol use: No    Drug use: Yes     Types: Marijuana    Sexual activity: Never     Partners: Female         MEDICATIONS     Current Outpatient Medications   Medication Sig    torsemide (DEMADEX) 20 mg tablet Take 0.5 Tabs by mouth daily.  cholecalciferol (VITAMIN D3) 5,000 unit capsule Take 1 Cap by mouth daily.  sacubitril-valsartan (ENTRESTO) 97 mg/103 mg tablet Take 1 Tab by mouth two (2) times a day.  umeclidinium-vilanterol (ANORO ELLIPTA) 62.5-25 mcg/actuation inhaler Take 1 Puff by inhalation daily.  potassium chloride (K-DUR, KLOR-CON) 20 mEq tablet Take 1 Tab by mouth three (3) times daily. (Patient taking differently: Take 20 mEq by mouth two (2) times a day.)    metoprolol succinate (TOPROL-XL) 100 mg tablet Take 0.5 Tabs by mouth daily for 360 days.  Indications: chronic heart failure    atorvastatin (LIPITOR) 40 mg tablet TAKE ONE TABLET BY MOUTH ONCE DAILY Indications: excessive fat in the blood    insulin NPH/insulin regular (NOVOLIN 70/30 U-100 INSULIN) 100 unit/mL (70-30) injection INJECT 10 UNITS SUBCUTANEOUSLY IN THE MORNING AND 8 UNIT IN THE EVENING (Patient taking differently: INJECT 80  UNITS SUBCUTANEOUSLY IN THE MORNING AND 20 UNITS IN THE EVENING)    aspirin 81 mg chewable tablet Take 81 mg by mouth daily.  acetaminophen (TYLENOL EXTRA STRENGTH) 500 mg tablet Take 1,000 mg by mouth every six (6) hours as needed for Pain. Indications: BACK PAIN    Insulin Syringes, Disposable, 1 mL syrg Pt to take 10 units w/ breakfast and 8 units w/ dinner     No current facility-administered medications for this visit. I have reviewed the nurses notes, vitals, problem list, allergy list, medical history, family, social history and medications. REVIEW OF SYMPTOMS      General: Pt denies excessive weight gain or loss. Pt is able to conduct ADL's  HEENT: Denies blurred vision, headaches, hearing loss, epistaxis and difficulty swallowing. Respiratory: Denies cough, congestion, shortness of breath, NAVARRO, wheezing or stridor. Cardiovascular: Denies precordial pain, palpitations, edema or PND  Gastrointestinal: Denies poor appetite, indigestion, abdominal pain or blood in stool  Genitourinary: Denies hematuria, dysuria, increased urinary frequency  Musculoskeletal: Denies joint pain or swelling from muscles or joints  Neurologic: Denies tremor, paresthesias, headache, or sensory motor disturbance  Psychiatric: Denies confusion, insomnia, depression  Integumentray: Denies rash, itching or ulcers. Hematologic: Denies easy bruising, bleeding       PHYSICAL EXAMINATION      There were no vitals filed for this visit. General: Well developed, in no acute distress. HEENT: No jaundice, oral mucosa moist, no oral ulcers  Neck: Supple, no stiffness, no lymphadenopathy, supple  Heart:  Normal S1/S2 negative S3 or S4.  Regular, no murmur, gallop or rub, no jugular venous distention  Respiratory: Clear bilaterally x 4, no wheezing or rales  Abdomen:   Soft, non-tender, bowel sounds are active.   Extremities:  No edema, normal cap refill, no cyanosis. Musculoskeletal: No clubbing, no deformities  Neuro: A&Ox3, speech clear, gait stable, cooperative, no focal neurologic deficits  Skin: Skin color is normal. No rashes or lesions. Non diaphoretic, moist.  Vascular: 2+ pulses symmetric in all extremities       DIAGNOSTIC DATA      EKG:        LABORATORY DATA      Lab Results   Component Value Date/Time    WBC 8.8 07/17/2019 12:00 AM    HGB 14.4 07/17/2019 12:00 AM    HCT 43.2 07/17/2019 12:00 AM    PLATELET 680 24/27/4735 12:00 AM    MCV 85 07/17/2019 12:00 AM      Lab Results   Component Value Date/Time    Sodium 140 08/20/2019 12:00 AM    Potassium 4.6 08/20/2019 12:00 AM    Chloride 96 08/20/2019 12:00 AM    CO2 22 08/20/2019 12:00 AM    Anion gap 7 04/04/2019 01:44 PM    Glucose 227 (H) 08/20/2019 12:00 AM    BUN 25 08/20/2019 12:00 AM    Creatinine 2.00 (H) 08/20/2019 12:00 AM    BUN/Creatinine ratio 13 08/20/2019 12:00 AM    GFR est AA 39 (L) 08/20/2019 12:00 AM    GFR est non-AA 34 (L) 08/20/2019 12:00 AM    Calcium 9.8 08/20/2019 12:00 AM    Bilirubin, total 0.6 08/20/2019 12:00 AM    AST (SGOT) 13 08/20/2019 12:00 AM    Alk. phosphatase 60 08/20/2019 12:00 AM    Protein, total 7.0 08/20/2019 12:00 AM    Albumin 4.4 08/20/2019 12:00 AM    Globulin 4.4 (H) 02/22/2017 03:16 AM    A-G Ratio 1.7 08/20/2019 12:00 AM    ALT (SGPT) 15 08/20/2019 12:00 AM           ASSESSMENT      1. Cardiomyopathy                          A. Ischemic                        B. NYHA class II  2. Atrial fibrillation                        A. Paroxysmal  3. CAD, native  4. Percutaneous transluminal angioplasty  5. ICD                         Q. [address]                        W. Single chamber  6. History of GI bleed  7.  Incomplete LBBB       PLAN     The patient has a CHADSVASC/CHADS 2 score of 3. He/she should avoid long term anticoagulation past/current medical history of GI bleeding. The patient feels strongly that anticoagulation and documented stroke risk greatly impacts his/her quality of life. The patient is a candidate for Watchman, a left atrial appendage closure (LAAC) device to reduce risk of thromboembolism. The patient has need for anticoagulation and is considered a high risk for stroke, but has a relative contraindication for long term anticoagulation. The patient is suitable for short term warfarin but deemed unable to take long term oral anticoagulation following the conclusion of shared decision making interaction with the patient. I have discussed at length the risks/benefits and alternatives of this procedure with regards to stroke risk versus bleeding risk. The patient understands that anticoagulation will be maintained in a variety of forms during the first year and agrees with plan. Risks include but not limited to: infection, bleeding, vessel injury, cardiac perforation at times requiring drainage or surgery, heart failure, migration of the device, emergency surgery, myocardial infarction, stroke and death. He wishes to proceed. Plan for LUÍS prior for ANGE sizing/morphology       FOLLOW-UP     Post procedure      Thank you, Grady Galo MD and Dr. Shon Camacho for allowing me to participate in the care of this extraordinarily pleasant male. Please do not hesitate to contact me for further questions/concerns.          Shayna Montero MD  Cardiac Electrophysiology / Cardiology    [place] [id number].  [address] [place] [address], [facility], [address] [place] [address], [address] [place] [address]  [phone] / [phone] Fax   [phone] / [phone] Fax

## 2019-09-04 NOTE — PROGRESS NOTES
Room # 2    Denies any cardiac complaints at this time.     Visit Vitals  /76 (BP 1 Location: Left arm, BP Patient Position: Sitting)   Pulse 68   Resp 16   Ht 5' 10\" (1.778 m)   Wt 198 lb 9.6 oz (90.1 kg)   SpO2 98%   BMI 28.50 kg/m²

## 2019-09-06 NOTE — PROGRESS NOTES
CONSULTATION REQUESTED BY: Baldemar Montes NP Heart Failure Kathi Benavides MD ,     REASON FOR CONSULT: Evaluation of hyperthyroidism    CHIEF COMPLAINT: Hyperthyroidism    HISTORY OF PRESENT ILLNESS:   Collin Cobian is a 79 y.o. male with a PMHx as noted below who was referred to our endocrinology clinic for evaluation of Hyperthyroidism. Patient was noted for hyperthyroidism since April 2019,  No thyroid autoantibodies or associated imaging for review,  Patient has no personal history of prior thyroid disorders. Notably family history for thyroid disorders is positive for likely hypothyroidism in his mother,  Patient reports palpitations, tremors that was worse few months ago,  Has hx of atrial fibrillation, with implantable device,  He denies weight loss, reports weight gain,   Blood pressure and heart rate today are normal, however he is on a beta blocker,  Note that he is was amiodarone, discontinued one month ago,  Patient has no further concerns and is hoping to learn what is causing this thyroid dysfunction. Review of most recent thyroid function:  Lab Results   Component Value Date    TSH 0.129 (L) 07/17/2019    TSH 0.049 (L) 04/10/2019    TSH 1.050 12/06/2018    FT4 2.27 (H) 07/17/2019    FT4 2.10 (H) 04/10/2019      Thyroid Lab Key:  TSILT = Thyroid stimulating antibodies  TRALT = TSH Receptor Antibodies  TMCLT = TPO antibodies  T3LT = Total T3 levels  760993 = Direct FT4  489174 = Free T3    PAST MEDICAL/SURGICAL HISTORY:   Past Medical History:   Diagnosis Date    Arthritis     hands/fingers    CAD (coronary artery disease)     involving native coronary artery of native heart without angina pectoris    Cardiomyopathy ([facility] 01/20/2017    A. Echo (1/19/17):  EF 5-10% with severe GHK,. Mildly dil LA. Mild TR.   PASP 46./systemic cardiomyopathy    Chronic kidney disease     Chronic obstructive pulmonary disease (HCC)     Chronic renal insufficiency 03/06/2017    Chronic systolic congestive heart failure, NYHA class 3 ([facility] 1/8/2019    Claudication ([facility] 10/9/2018    Congestive heart failure ([facility]     Diabetes mellitus ([facility] 3/6/2017    IDDM    Dyslipidemia 3/6/2017    A. FLP (1/19/17): Tot 120, , HDL 19, LDL 76 (no Rx).  H/O: GI bleed 3/6/2017    Hepatitis C 3/6/2017    Hypokalemia     ICD (implantable cardioverter-defibrillator) in place     Ill-defined condition     flu 1/2018     Neuropathy 11/19/2018    Noncompliance with medication regimen 9/11/2018    Paroxysmal atrial fibrillation ([facility] 3/6/2017    Peripheral vascular disease ([facility] 9/18/2017    A. RICKY (3/20/17): Right 0.58, Left 0.56.  Tobacco dependence syndrome 10/9/2018    Vitamin D deficiency 6/4/2018     Past Surgical History:   Procedure Laterality Date    COLONOSCOPY N/A 2/17/2017    COLONOSCOPY performed by Jennifer Trujillo MD at [address] COLONOSCOPY N/A 2/5/2018    COLONOSCOPY performed by Jennifer Trujillo MD at [facility] ENDOSCOPY    HX CORONARY STENT PLACEMENT      LM, RCA x 2     HX GI      colonoscopy x 3 - last 2/2017 - polyp    HX IMPLANTABLE CARDIOVERTER DEFIBRILLATOR         ALLERGIES:   Allergies   Allergen Reactions    Heparin (Porcine) Other (comments)     Was told when in the hospital that if he received heparin again that it would be deadly. MEDICATIONS ON ADMISSION:     Current Outpatient Medications:     clopidogrel (PLAVIX) 75 mg tab, Take  by mouth., Disp: , Rfl:     torsemide (DEMADEX) 20 mg tablet, Take 0.5 Tabs by mouth daily. , Disp: 180 Tab, Rfl: 1    cholecalciferol (VITAMIN D3) 5,000 unit capsule, Take 1 Cap by mouth daily. , Disp: 30 Cap, Rfl: 2    sacubitril-valsartan (ENTRESTO) 97 mg/103 mg tablet, Take 1 Tab by mouth two (2) times a day., Disp: 180 Tab, Rfl: 3    umeclidinium-vilanterol (ANORO ELLIPTA) 62.5-25 mcg/actuation inhaler, Take 1 Puff by inhalation daily. , Disp: 1 Inhaler, Rfl: 5    potassium chloride (K-DUR, KLOR-CON) 20 mEq tablet, Take 1 Tab by mouth three (3) times daily. (Patient taking differently: Take 20 mEq by mouth two (2) times a day.), Disp: 60 Tab, Rfl: 3    metoprolol succinate (TOPROL-XL) 100 mg tablet, Take 0.5 Tabs by mouth daily for 360 days. Indications: chronic heart failure, Disp: 90 Tab, Rfl: 1    atorvastatin (LIPITOR) 40 mg tablet, TAKE ONE TABLET BY MOUTH ONCE DAILY  Indications: excessive fat in the blood, Disp: 90 Tab, Rfl: 3    insulin NPH/insulin regular (NOVOLIN 70/30 U-100 INSULIN) 100 unit/mL (70-30) injection, INJECT 10 UNITS SUBCUTANEOUSLY IN THE MORNING AND 8 UNIT IN THE EVENING (Patient taking differently: No sig reported), Disp: 5 Vial, Rfl: 5    aspirin 81 mg chewable tablet, Take 81 mg by mouth daily. , Disp: , Rfl:     acetaminophen (TYLENOL EXTRA STRENGTH) 500 mg tablet, Take 1,000 mg by mouth every six (6) hours as needed for Pain.  Indications: BACK PAIN, Disp: , Rfl:     Insulin Syringes, Disposable, 1 mL syrg, Pt to take 10 units w/ breakfast and 8 units w/ dinner, Disp: 500 Syringe, Rfl: 1    SOCIAL HISTORY:   Social History     Socioeconomic History    Marital status:      Spouse name: Not on file    Number of children: Not on file    Years of education: Not on file    Highest education level: Not on file   Occupational History    Not on file   Social Needs    Financial resource strain: Not on file    Food insecurity:     Worry: Not on file     Inability: Not on file    Transportation needs:     Medical: Not on file     Non-medical: Not on file   Tobacco Use    Smoking status: Current Some Day Smoker     Packs/day: 0.50     Years: 45.00     Pack years: 22.50     Last attempt to quit: 2017     Years since quittin.6    Smokeless tobacco: Never Used    Tobacco comment: using Nicotine patches   Substance and Sexual Activity    Alcohol use: No    Drug use: Yes     Types: Marijuana    Sexual activity: Never     Partners: Female   Lifestyle    Physical activity: Days per week: Not on file     Minutes per session: Not on file    Stress: Not on file   Relationships    Social connections:     Talks on phone: Not on file     Gets together: Not on file     Attends L[employer] service: Not on file     Active member of club or organization: Not on file     Attends meetings of clubs or organizations: Not on file     Relationship status: Not on file    Intimate partner violence:     Fear of current or ex partner: Not on file     Emotionally abused: Not on file     Physically abused: Not on file     Forced sexual activity: Not on file   Other Topics Concern    Not on file   Social History Narrative    Not on file       FAMILY HISTORY:  Family History   Problem Relation Age of Onset    Hypertension Mother     Heart Disease Mother         MURMUR    Cancer Father         COLON    Colon Cancer Father     Other Sister         born totally handicapped/epileptic    Kidney Disease Sister          from renal shutdown    Heart Disease Brother     Other Brother         ?pancreatic cancer or ?pancreatitis    Cancer Maternal Uncle         toes and spread    Cancer Paternal Uncle         stomach    Heart Attack Maternal Grandfather 47    Cancer Paternal Grandfather         bone    Cancer Maternal Uncle         stomach    Cancer Maternal Uncle         lung    Anesth Problems Neg Hx        REVIEW OF SYSTEMS: Complete ROS assessed and noted for that which is described above, all else are negative.   Eyes: normal  ENT: normal  CVS: normal  Resp: normal  GI: normal  : normal  GYN: normal  Endocrine: normal  Integument: normal  Musculoskeletal: normal  Neuro: normal  Psych: normal      PHYSICAL EXAMINATION:    VITAL SIGNS:  Visit Vitals  /76   Pulse 73   Ht 5' 10\" (1.778 m)   Wt 196 lb 3.2 oz (89 kg)   BMI 28.15 kg/m²       GENERAL: NCAT, Sitting comfortably, NAD  EYES: EOMI, non-icteric, no proptosis  Ear/Nose/Throat: NCAT, no inflammation  LYMPH NODES: No LAD  CARDIOVASCULAR: S1 S2, RRR, No murmur, 2+ radial pulses  RESPIRATORY: CTA b/l, no wheeze/rales  GASTROINTESTINAL: soft, NT, ND  MUSCULOSKELETAL: Normal ROM, no atrophy  SKIN: warm, no edema/rash/ or other skin changes  NEUROLOGIC: 5/5 power all extremities, AAOx3, no tremor   PSYCHIATRIC: Normal affect, Normal insight and judgement    REVIEW OF LABORATORY AND RADIOLOGY DATA:   Labs and documentation have been reviewed as described above. ASSESSMENT AND PLAN:   Saba Gibson is a 79 y.o. male with a PMHx as noted above who was referred to our endocrinology clinic for evaluation of hyperthyroidism. Hyperthyroidism    Based on the patients symptoms and thyroid function which I have reviewed, it does suggest hyperthyroidism. We have reviewed the general causes of hyperthyroidism together which include subacute thyroiditis, graves disease, and toxic nodules. We also discussed the general workup which is may involve confirming the abnormal labs, checking for antibodies, +/- a thyroid uptake and scan, which would only be performed as needed. Additionally we discussed the different treatments for hyperthyroidism which include methimazole, [place] treatment, and surgery, in addition to the appropriateness of each of these therapies depending on the cause with respect to achieving permanent remission of disease. Patient had been on amiodarone, and so it is not clear if patient has an inflammatory thyroiditis or whether there is actual increased activity of the thyroid gland and imaging may be warranted. We will start by checking for thyroid autoantibodies that could suggest underlying graves disease for example, in which case a trial of methimazole would be warranted, however if negative and thyroid levels remain elevated then a thyroid uptake and scan may better narrow down his diagnosis.      Today we will obtain:  TSH, FT4, TT3, Thyroid receptor antibodies to be collected today,  Already on a beta blocker at the present time,  Will review results and determine if therapy to be started, or if imaging would be preferred before starting therapy,    Plan to have patient RTC in 2 months with Benjamin SUAREZ  [address] [facility] Endocrinology  [address] [place] [address]

## 2019-09-06 NOTE — PATIENT INSTRUCTIONS
Labs today, I will call with results and plan. It is possible that we may do some imaging of your thyroid gland if needed. * Remember to have your blood work collected at the laboratory 3 days before your next visit using the lab sheet provided during your visit. See you NOV 15th at 3:50 PM      Eric SUAREZ  39 [facility] Endocrinology  [address] [place] [address]

## 2019-09-09 NOTE — PROGRESS NOTES
TSH up to 5.2  Patient appears to have had a thyroiditis,  I had reviewed the thyroiditis graph with him on his visit and his labs appear to be following this course,  With consideration to his TSH being very mild, and his thyroid levels being normal, we will monitor,  He is reminded of prelabs before next visit,     Robert Scott.  [address] [facility] Endocrinology  [address] [place] [address]

## 2019-09-13 NOTE — PROGRESS NOTES
Spoke with pt concerning LUÍS procedure. (Pt [id number]). Instructions given to pt per VO of Dr. Rosa Maria Hicks. Pt NPO after midnight; hold none and take all other meds with sip of water in AM; have someone available to drive pt to and from procedure; pack a bag in case an overnight stay is warranted. LUÍS scheduled for 12:30 on 9/27. Pt advised to arrive 2hrs prior to procedure for prep. Labs BMP in cc. Pt expressed understanding. Opportunities for questions, clarifications, and concerns provided.

## 2019-09-16 NOTE — TELEPHONE ENCOUNTER
Recommended lab work not yet received. Contacted patient to discuss. He stated he has not had lab work drawn, but will present to lab magdy at [facility] this week for draw. Patient also reported he has had diffculty receiving vitamin D and potassium refills from his pharmacy. Informed patient potassium refill send 9/14/19. He stated he will present to pharmacy to . Noted Vitamin D prescription 30 day supply with 2 additional refills sent 8/12/19 by JING Soto NP to [address] [place] [address] on [facility] in [place]. Verified correct pharmacy with patient. He confirmed and said the pharmacy told him \"the problem is on the doctor's end. \" Informed patient will contact the pharmacy to clarify issue. Patient verbalized understanding and had no further questions. [place]. Per pharmacy tech refills for potassium and vitamin d are ready for pickup. Contacted patient to notify. He reported he also needs atorvastatin filled. Noticed prescription does have refill on file. Patient advised to contact [facility] [place] [address] for refill. Patient verbalized understanding and had no further questions. Will await lab results. Hafsa Monson RN.

## 2019-09-17 NOTE — TELEPHONE ENCOUNTER
Rema Murrieta from [facility] heart failure called asking about the LUÍS procedure. Confirmed date/time. Orders for bmp per Dr. Ramirez BROOKE.   Dx: pre LUÍS/watchman

## 2019-09-18 NOTE — TELEPHONE ENCOUNTER
I called patient to notify him per [facility] that all labs are stable, no changes at this time. He states understanding.

## 2019-09-18 NOTE — TELEPHONE ENCOUNTER
Contacted patient and notified of results and recommendations. Patient strongly encouraged to monitor CHO intake and restrict sodium intake. Discussed the possibility of adding jardiance. Patient not interested at this time. Patient encouraged to contact [facility] with any questions or concerns. Patient verbalized understanding and had no further questions at this time. Jameel Caballero RN.

## 2019-09-18 NOTE — PROGRESS NOTES
Have pt take torsemide as needed for increased SOB or edema. Creatinine is elevated at 2.02 and PNBP is decreased at 709. Blood glucose elevated at 300- please monitor diet habits and watch CHO intake. Increased weight gain is more than likely from diet, try to limit CHO and Na+. Might want to think about adding jardiance to medications to help with diabetes and heart failure. Can send a message to Dr. Jt Mehta office.

## 2019-09-18 NOTE — TELEPHONE ENCOUNTER
----- Message from Caemlia Abraham NP sent at 9/18/2019  9:12 AM EDT -----  Have pt take torsemide as needed for increased SOB or edema. Creatinine is elevated at 2.02 and PNBP is decreased at 709. Blood glucose elevated at 300- please monitor diet habits and watch CHO intake. Increased weight gain is more than likely from diet, try to limit CHO and Na+. Might want to think about adding jardiance to medications to help with diabetes and heart failure. Can send a message to Dr. Jt Mehta office.

## 2019-09-24 NOTE — TELEPHONE ENCOUNTER
Patient states that he needs a new prescription for amiodarone 200 mg sent to his pharmacy. Pharmacy confirmed.      Phone:  [phone]

## 2019-09-24 NOTE — TELEPHONE ENCOUNTER
Called patient, ID verified using two patient identifiers. Advised patient that Amiodarone had been discontinued on 7/26/19 per JING Sams NP at The [address] [place] [address] due to abnormal Thyroid labs. Medication is not on his current medication list. Patient states he has still been taking the Amiodarone and has seen the endocrinologist and his Thyroid labs are ok. He has an appointment with Gabbi Shukla NP tomorrow and will discuss his Amiodarone and endocrinologist visit with her then.

## 2019-09-25 NOTE — PROGRESS NOTES
Unique Suggs. is a 79 y.o. male who presents for routine immunizations. He denies any symptoms , reactions or allergies that would exclude them from being immunized today. Risks and adverse reactions were discussed and the VIS was given to them. All questions were addressed. He was observed for 15 min post injection. There were no reactions observed.     Shoshana Mcneal

## 2019-09-25 NOTE — PATIENT INSTRUCTIONS
Stop taking Amiodarone at home    Take Metoprolol (toprol XL) 50mg daily - whole tablet     CONTINUE CURRENT medications    Labs today - will call with abnormal results    Check blood glucose levels     Limit WHITE foods ( flour, sugar, pasta, rice, potatoes)    Limit Sodium less than 2000mg daily     limit tobacco use    Keep a positive attitude     Follow up with [facility] in 2-3 months with NP     HF Education: Continue daily weights (in the morning, after voiding).   Notify HF team of overnight weight gains > 2-3 lbs over night or>5 lbs in 7 days or if any of the following

## 2019-09-25 NOTE — PROGRESS NOTES
[facility] in [place], [address] [place] [address] Note    Patient name: Nancy Bentley.   Patient : 1950  Patient MRN: [id number]  Date of service: 19    Primary care physician: Priya Allen MD  Primary cardiologist: Dr. Dayo Mcdonough:  Chief Complaint   Patient presents with    CHF    Bloated    Constipation       PLAN:  Continue current medical therapy for heart failure  Continue current dose of toprol XL 50mg daily  Continue Entresto 97/103 mg twice daily  Cannot tolerate aldactone due to hyperkalemia; not interested in taking veltassa  Torsemide 10mg prn for SOB/edema/weight gain  Encourage albuterol inhaler use prn for SOB/wheezing  OK to start nicotine patches per PCP   Continue ASA, plavix and statin for CAD  Stopped amiodarone due to Hyperthyroidism, check thyroid profile every 3 months and PFT/DLCO every year   ICD interrogation last check 18, follow-up with Dr. Nano Cuevas  ECHO  19- LVEF 21-25%, LVGH, mild MR, mild TR, mild pulm HTN, est PA 45mmHg, LVIDd 5.32cm, Tapse 2.38cm  LUÍS scheduled for 19- candidate for Watchman   Labs today: CBC, BMP, pro-NT-BNP, mag  Reinforced low salt diet, not adhering to low Na+/CHO diet  Reinforced fluid restriction to 6 x 8oz glasses per day  Discussed tobacco cessation, currently smoking 1ppd  Advanced care plan present on the chart  Counseled to obtain home scale and arm blood pressure cuff  Document in diary BP/HR before and 2 hours after taking medications and daily weights  Received flu vaccination today   Recommend  pneumonia vaccine every 5 years   Follow up with Endocrinologist, Dr. Nancy Mcclain, for hyperthyroidism (2019)  Patient declines referral with vascular surgery for PAD  Follow-up with GI on h/o GI bleed and colorectal surgery, Dr. Rajesh Araujo, repeat EGD/colosocopy (10/27/19)  Follow-up with primary cardiologist, Dr. Ryan Jain  Follow-up with EP cardiologist, Dr. Nano Cuevas for follow up potential watchmen procedure  Follow-up with PCP, Dr. Sharon Lopez, for diabetes management; patient interested in [facility]  Patient declined referral to pulmonary for COPD and sleep study  Return to [facility] in 4 weeks with NP/MD    IMPRESSION:  Chronic systolic heart failure  Stage C, NYHA class II symptoms  Coronary artery disease   S/p impella assisted high risk PCI with MIKE to 100% p-RCA and 70% LM 2/9/17 by Dr. Yamila John  Ischemic cardiomyopathy, LVEF 25%  HTN  PAD not amenable to PTCA  Incomplete LBBB  CKD3  KELLY  COPD  S/p GI bleed and colorectal surgery  Medical noncompliance  Claudication  PAD  Hepatitis C    CARDIAC IMAGING:  Echo (7/11/18) LVEF 25%, akinesis of basal-mid inferior wall. PA pressure 42mmHg. ECHO (7/11/19) 21-25%, LVGH, mild MR, mild TR, mild pulm HTN, est PA 45mmHg, LVIDd 5.32cm, Tapse 2.38cm  ICD interrogation (12/6/18) no events, normal device function, good batteries    HEMODYNAMICS:  RHC not done  CPEST not done  6MW not done    HISTORY OF PRESENT ILLNESS:  I had the pleasure of seeing Unique Robertson in [address] at [facility] in [facility]. Briefly, Unique Robertson is a 79 y.o. male with h/o CAD s/p PCI of LM,  of RCA, ischemic cardiomyopathy with LVEF 02%, s/p AICD complicated by  PAF (amiodarone), Hep C, GI bleed (s/p colorectal surgery), current tobacco abuse and PAD. INTERVAL HISTORY:  Today, patient presents for routine clinic visit. Patient is doing better. Continues to smoke >1ppd of cigarettes, attributes this to increased life stressors. Denies symptoms of presyncope, syncope, lightheadedness, dizziness, orthopnea, PND, nocturia, CP, palpitations, or SOB. Patient walked to our clinic from parking lot without having to slow down or stop. Patient can walk more than one block without symptoms of fatigue or shortness of breath.  Patient can perform home activities without a problem and routinely participates in daily walking for more than 15 minutes. He reports NAVARRO with fast walking as the only time he is dyspneic or while doing yard work. Patient denies symptoms of volume overload, although his has significant abdominal fat he attributes to overeating. He reports increased appetite and reports dietary discrepancies attributing his weight gain over the last 3 months. He has gained 7lbs from last clinic visit on 9/6/19. Patient denies irregular heart rate or palpitations. ICD has not fired. He has a LUÍS scheduled on 9/27/19 with Dr. Ynes Hensley. Patient denies other cardiac symptoms such as chest pain. Positive for claudication. Patient is not compliant with fluid restriction or always taking medications as prescribed. He recently was taking amiodarone called for a refill, was not taking the atorvastatin. Discussed importance of medication compliance. He reports not taking home BPs, he did not not bring his home BP cuff with him today. REVIEW OF SYSTEMS:  General: Denies fever, night sweats. Ear, nose and throat: Denies difficulty hearing, sinus problems, runny nose, post-nasal drip, ringing in ears, mouth sores, loose teeth, ear pain, nosebleeds, sore throate, facial pain or numbess  Cardiovascular: see above in the interval history  Respiratory: Positive for wheezing, non productive cough, Denies hemoptysis.   Gastrointestinal: Denies heartburn, Positive for constipation, intolerance to certain foods, diarrhea, abdominal pain, nausea, vomiting, difficulty swallowing, blood in stool  Kidney and bladder: Denies painful urination, frequent urination, urgency, prostate problems and impotence  Musculoskeletal: Denies joint pain, muscle weakness, +claudication  Skin and hair: Denies change in existing skin lesions, hair loss or increase, breast changes    PHYSICAL EXAM:  Vital signs:   Visit Vitals  /90 (BP 1 Location: Right arm, BP Patient Position: Sitting)   Pulse 76   Temp 96.2 °F (35.7 °C) (Oral)   Resp 20   Ht 5' 10\" (1.778 m)   Wt 203 lb 12.8 oz (92.4 kg)   SpO2 97%   BMI 29.24 kg/m²     General: Patient is well developed, well-nourished in no acute distress  HEENT: Normocephalic and atraumatic. No scleral icterus. Pupils are equal, round and reactive to light and accomodation. No conjunctival injection. Oropharynx is clear. Neck: Supple. No evidence of thyroid enlargements or lymphadenopathy. JVD: Cannot be appreciated   Lungs: Scattered wheezing throughout, no rhonchi or rales. Heart: Regular rate and rhythm with normal S1 and S2. No murmurs, gallops or rubs. ICD in RU chest  Abdomen: Soft, obese, rotund, no mass or tenderness. No organomegaly or hernia. Bowel sounds present. Genitourinary and rectal: deferred  Extremities: No cyanosis, clubbing, or edema. flaky BLE, pink/yasir   Neurologic: No focal sensory or motor deficits are noted. Grossly intact. Psychiatric: Awake, alert and oriented x 3. Appropriate mood and affect  Skin: Warm, dry and well perfused, No lesions, nodules or rashes are noted. PAST MEDICAL HISTORY:  Past Medical History:   Diagnosis Date    Arthritis     hands/fingers    CAD (coronary artery disease)     involving native coronary artery of native heart without angina pectoris    Cardiomyopathy ([address] 01/20/2017    A. Echo (1/19/17):  EF 5-10% with severe GHK,. Mildly dil LA. Mild TR. PASP 46./systemic cardiomyopathy    Chronic kidney disease     Chronic obstructive pulmonary disease (HCC)     Chronic renal insufficiency 03/06/2017    Chronic systolic congestive heart failure, NYHA class 3 ([address] 1/8/2019    Claudication ([address] 10/9/2018    Congestive heart failure ([address]     Diabetes mellitus ([address] 3/6/2017    IDDM    Dyslipidemia 3/6/2017    A. FLP (1/19/17): Tot 120, , HDL 19, LDL 76 (no Rx).     H/O: GI bleed 3/6/2017    Hepatitis C 3/6/2017    Hypokalemia     ICD (implantable cardioverter-defibrillator) in place     Ill-defined condition     flu 2018     Neuropathy 2018    Noncompliance with medication regimen 2018    Paroxysmal atrial fibrillation ([facility] 3/6/2017    Peripheral vascular disease ([facility] 2017    A. RICKY (3/20/17): Right 0.58, Left 0.56.  Tobacco dependence syndrome 10/9/2018    Vitamin D deficiency 2018       PAST SURGICAL HISTORY:  Past Surgical History:   Procedure Laterality Date    COLONOSCOPY N/A 2017    COLONOSCOPY performed by Wiley Reyes. Sonya Wilder MD at [facility] COLONOSCOPY N/A 2018    COLONOSCOPY performed by Wiley Reyes.  Sonya Wilder MD at [facility] ENDOSCOPY    HX CORONARY STENT PLACEMENT      LM, RCA x 2     HX GI      colonoscopy x 3 - last 2017 - polyp    HX IMPLANTABLE CARDIOVERTER DEFIBRILLATOR         FAMILY HISTORY:  Family History   Problem Relation Age of Onset    Hypertension Mother     Heart Disease Mother         MURMUR    Cancer Father         COLON    Colon Cancer Father     Other Sister         born totally handicapped/epileptic    Kidney Disease Sister          from renal shutdown    Heart Disease Brother     Other Brother         ?pancreatic cancer or ?pancreatitis    Cancer Maternal Uncle         toes and spread    Cancer Paternal Uncle         stomach    Heart Attack Maternal Grandfather 47    Cancer Paternal Grandfather         bone    Cancer Maternal Uncle         stomach    Cancer Maternal Uncle         lung    Anesth Problems Neg Hx        SOCIAL HISTORY:  Social History     Socioeconomic History    Marital status:      Spouse name: Not on file    Number of children: Not on file    Years of education: Not on file    Highest education level: Not on file   Tobacco Use    Smoking status: Current Some Day Smoker     Packs/day: 0.50     Years: 45.00     Pack years: 22.50     Last attempt to quit: 2017     Years since quittin.6    Smokeless tobacco: Never Used    Tobacco comment: using Nicotine patches Substance and Sexual Activity    Alcohol use: No    Drug use: Yes     Types: Marijuana    Sexual activity: Never     Partners: Female       LABORATORY RESULTS:     Labs Latest Ref Rng & Units 9/17/2019 9/6/2019 8/20/2019 7/29/2019   Albumin 3.6 - 4.8 g/dL - - 4.4 -   Calcium 8.6 - 10.2 mg/dL 9.3 - 9.8 9.4   SGOT 0 - 40 IU/L - - 13 -   Glucose 65 - 99 mg/dL 300(H) - 227(H) 151(H)   BUN 8 - 27 mg/dL 28(H) - 25 30(H)   Creatinine 0.76 - 1.27 mg/dL 2.02(H) - 2.00(H) 1.83(H)   Sodium 134 - 144 mmol/L 139 - 140 142   Potassium 3.5 - 5.2 mmol/L 4.2 - 4.6 4.8   TSH 0.450 - 4.500 uIU/mL - 5.260(H) - -   Some recent data might be hidden     Lab Results   Component Value Date/Time    TSH 5.260 (H) 09/06/2019 01:51 PM    TSH 0.129 (L) 07/17/2019 12:00 AM    TSH 0.049 (L) 04/10/2019 12:00 AM    TSH 1.050 12/06/2018 01:56 PM    TSH 3.040 12/04/2017 10:17 AM    TSH 2.04 01/22/2017 11:11 AM    TSH 1.49 01/18/2017 11:12 PM       CURRENT MEDICATIONS:    Current Outpatient Medications:     loratadine (CLARITIN) 10 mg tablet, Take 10 mg by mouth daily. , Disp: , Rfl:     torsemide (DEMADEX) 20 mg tablet, Take 0.5 Tabs by mouth as needed (for shortness of breath or edema). FYI dose decrease by JING Gloria NP on 9/18/19, Disp: 180 Tab, Rfl: 1    potassium chloride (K-DUR, KLOR-CON) 20 mEq tablet, TAKE 1 TABLET BY MOUTH TWICE DAILY, Disp: 60 Tab, Rfl: 3    metoprolol succinate (TOPROL-XL) 50 mg XL tablet, Take 1 Tab by mouth daily. , Disp: 90 Tab, Rfl: 3    clopidogrel (PLAVIX) 75 mg tab, Take  by mouth., Disp: , Rfl:     cholecalciferol (VITAMIN D3) 5,000 unit capsule, Take 1 Cap by mouth daily. , Disp: 30 Cap, Rfl: 2    sacubitril-valsartan (ENTRESTO) 97 mg/103 mg tablet, Take 1 Tab by mouth two (2) times a day., Disp: 180 Tab, Rfl: 3    umeclidinium-vilanterol (ANORO ELLIPTA) 62.5-25 mcg/actuation inhaler, Take 1 Puff by inhalation daily. , Disp: 1 Inhaler, Rfl: 5    atorvastatin (LIPITOR) 40 mg tablet, TAKE ONE TABLET BY MOUTH ONCE DAILY  Indications: excessive fat in the blood, Disp: 90 Tab, Rfl: 3    insulin NPH/insulin regular (NOVOLIN 70/30 U-100 INSULIN) 100 unit/mL (70-30) injection, INJECT 10 UNITS SUBCUTANEOUSLY IN THE MORNING AND 8 UNIT IN THE EVENING (Patient taking differently: No sig reported), Disp: 5 Vial, Rfl: 5    aspirin 81 mg chewable tablet, Take 81 mg by mouth daily. , Disp: , Rfl:     acetaminophen (TYLENOL EXTRA STRENGTH) 500 mg tablet, Take 1,000 mg by mouth every six (6) hours as needed for Pain. Indications: BACK PAIN, Disp: , Rfl:     Insulin Syringes, Disposable, 1 mL syrg, Pt to take 10 units w/ breakfast and 8 units w/ dinner, Disp: 500 Syringe, Rfl: 1    PEG 3350-Electrolytes (COLYTE) 240-22.72-6.72 -5.84 gram solution, , Disp: , Rfl:       Thank you for your referral and allowing me to participate in this patient's care.     Ronan Strickland NP  [address] [place] [address], [address]  Phone: [phone]  Fax: [phone]

## 2019-09-26 NOTE — TELEPHONE ENCOUNTER
I called patient, advised him per JING Archer:  Labs stable, PNBP elevated at 1609, take torsemide 10mg daily, limit sodium in diet less than 2000mg and fluids 6x8 oz daily.  Continue daily weights, take extra dose of torsemide for weight 2-3 lbs overnight or 5lbs in 7 days. He states understanding and has no questions.

## 2019-09-27 NOTE — PROCEDURES
Karla Marc MD. [facility]              Patient: Dorna Harada. : 1950      Today's Date: 2019      BRIEF LUÍS PROCEDURE NOTE    Date of Procedure: 2019   Preoperative Diagnosis: Paroxysmal Afib  Postoperative Diagnosis: Measurements taken for Watchman device   Procedure: LUÍS  Cardiologist: Marcellus Roth MD  Anesthesia: local + IV sedation  Estimated Blood Loss: None  Specimens Removed: None  Grafts, transplants, or devices implanted: None  Findings: ANGE measurements taken for Watchman device. See full LUÍS note.   Complications: none

## 2019-09-27 NOTE — DISCHARGE INSTRUCTIONS
AFTER YOU TRANSESOPHAGEAL ECHOCARDIOGRAM    Be sure someone else drives you home. You may feel drowsy for several hours. Do not eat or drink for at least two hours after your procedure. Your throat will be numb and there is a risk you might have difficulty swallowing for a while. Be careful when you do eat or drink for the first time especially with hot fluids since you could easily burn your throat. Call your doctor if:    · You are bleeding from your throat or mouth. · You have trouble breathing all of a sudden. · You have chest pain or any pain that spreads to your neck, jaw, or arms. · You have questions or concerns. · You have a fever greater than 101°F.        Special Instructions:    No driving for 24 hours.

## 2019-09-27 NOTE — PROGRESS NOTES
TRANSFER - IN REPORT:    [facility]  [facility] [place]unit) for ordered procedure      Report consisted of patients Situation, Background, Assessment and   Recommendations(SBAR). Information from the following report(s) SBAR was reviewed with the receiving nurse. Opportunity for questions and clarification was provided. Assessment completed upon patients arrival to unit and care assumed.

## 2019-09-27 NOTE — H&P
Karla Marc MD. [facility] [place]              Patient: Dorna Harada. : 1950      Today's Date: 2019            HISTORY OF PRESENT ILLNESS:     History of Present Illness:  PAF and history of GI bleeding  - he is being considered for a Watchman device   - Plan for LUÍS prior for ANGE sizing/morphology    No contraindications to LUÍS. PAST MEDICAL HISTORY:     Past Medical History:   Diagnosis Date    Arthritis     hands/fingers    CAD (coronary artery disease)     involving native coronary artery of native heart without angina pectoris    Cardiomyopathy ([address] 2017    A. Echo (17):  EF 5-10% with severe GHK,. Mildly dil LA. Mild TR. PASP 46./systemic cardiomyopathy    Chronic kidney disease     Chronic obstructive pulmonary disease (HCC)     Chronic renal insufficiency 2017    Chronic systolic congestive heart failure, NYHA class 3 ([address] 2019    Claudication ([address] 10/9/2018    Congestive heart failure ([address]     Diabetes mellitus ([address] 3/6/2017    IDDM    Dyslipidemia 3/6/2017    A. FLP (17): Tot 120, , HDL 19, LDL 76 (no Rx).  H/O: GI bleed 3/6/2017    Hepatitis C 3/6/2017    Hypokalemia     ICD (implantable cardioverter-defibrillator) in place     Ill-defined condition     flu 2018     Neuropathy 2018    Noncompliance with medication regimen 2018    Paroxysmal atrial fibrillation ([address] 3/6/2017    Peripheral vascular disease ([address] 2017    A. RICKY (3/20/17): Right 0.58, Left 0.56.  Tobacco dependence syndrome 10/9/2018    Vitamin D deficiency 2018         Past Surgical History:   Procedure Laterality Date    CARDIAC SURG PROCEDURE UNLIST      CORONARY STENT    COLONOSCOPY N/A 2017    COLONOSCOPY performed by Micheline Hernández. Mauri Collado MD at [address] COLONOSCOPY N/A 2018    COLONOSCOPY performed by Micheline Hernández.  Mauri Collado MD at [facility] ENDOSCOPY    HX CORONARY STENT PLACEMENT      LM, RCA x 2     HX GI      colonoscopy x 3 - last 2/2017 - polyp    HX IMPLANTABLE CARDIOVERTER DEFIBRILLATOR      HX OTHER SURGICAL      HX PACEMAKER      AICD         MEDICATIONS:       No current facility-administered medications on file prior to encounter. Current Outpatient Medications on File Prior to Encounter   Medication Sig Dispense Refill    loratadine (CLARITIN) 10 mg tablet Take 10 mg by mouth daily.  torsemide (DEMADEX) 20 mg tablet Take 0.5 Tabs by mouth as needed (for shortness of breath or edema). FYI dose decrease by JING Lazar NP on 9/18/19 180 Tab 1    potassium chloride (K-DUR, KLOR-CON) 20 mEq tablet TAKE 1 TABLET BY MOUTH TWICE DAILY 60 Tab 3    metoprolol succinate (TOPROL-XL) 50 mg XL tablet Take 1 Tab by mouth daily. 90 Tab 3    clopidogrel (PLAVIX) 75 mg tab Take  by mouth.  cholecalciferol (VITAMIN D3) 5,000 unit capsule Take 1 Cap by mouth daily. 30 Cap 2    sacubitril-valsartan (ENTRESTO) 97 mg/103 mg tablet Take 1 Tab by mouth two (2) times a day. 180 Tab 3    umeclidinium-vilanterol (ANORO ELLIPTA) 62.5-25 mcg/actuation inhaler Take 1 Puff by inhalation daily. 1 Inhaler 5    atorvastatin (LIPITOR) 40 mg tablet TAKE ONE TABLET BY MOUTH ONCE DAILY  Indications: excessive fat in the blood 90 Tab 3    insulin NPH/insulin regular (NOVOLIN 70/30 U-100 INSULIN) 100 unit/mL (70-30) injection INJECT 10 UNITS SUBCUTANEOUSLY IN THE MORNING AND 8 UNIT IN THE EVENING (Patient taking differently: No sig reported) 5 Vial 5    aspirin 81 mg chewable tablet Take 81 mg by mouth daily.  acetaminophen (TYLENOL EXTRA STRENGTH) 500 mg tablet Take 1,000 mg by mouth every six (6) hours as needed for Pain.  Indications: BACK PAIN      Insulin Syringes, Disposable, 1 mL syrg Pt to take 10 units w/ breakfast and 8 units w/ dinner 500 Syringe 1       Allergies   Allergen Reactions    Heparin (Porcine) Other (comments)     Was told when in the hospital that if he received heparin again that it would be deadly. SOCIAL HISTORY:     Social History     Tobacco Use    Smoking status: Current Some Day Smoker     Packs/day: 1.00     Years: 45.00     Pack years: 45.00     Last attempt to quit: 2017     Years since quittin.6    Smokeless tobacco: Never Used    Tobacco comment: using Nicotine patches   Substance Use Topics    Alcohol use: No    Drug use: Not Currently         FAMILY HISTORY:     Family History   Problem Relation Age of Onset    Hypertension Mother     Heart Disease Mother         MURMUR    Cancer Father         COLON    Colon Cancer Father     Other Sister         born totally handicapped/epileptic    Kidney Disease Sister          from renal shutdown    Heart Disease Brother     Other Brother         ?pancreatic cancer or ?pancreatitis    Cancer Maternal Uncle         toes and spread    Cancer Paternal Uncle         stomach    Heart Attack Maternal Grandfather 47    Cancer Paternal Grandfather         bone    Cancer Maternal Uncle         stomach    Cancer Maternal Uncle         lung    Anesth Problems Neg Hx               REVIEW OF SYMPTOMS:      Review of Symptoms:  Constitutional: Negative for fever   HEENT: Negative for vision changes. Respiratory: Negative for productive cough  Cardiovascular: Negative for syncope    Gastrointestinal: Negative for abdominal pain, melena  Genitourinary: Negative for dysuria  Skin: Negative for rash  Heme: No problems bleeding. Neuro - no speech changes or focal weaknesses          PHYSICAL EXAM:      Physical Exam:  Vitals pending   Patient appears generally well, mood and affect are appropriate and pleasant. HEENT:  Hearing intact, non-icteric, normocephalic, atraumatic. Neck Exam: Supple, No JVD  Lung Exam: Clear to auscultation, even breath sounds. Cardiac Exam: Regular rate and rhythm with no murmur  Abdomen: Soft, non-tender. Obese   Extremities: Moves all ext well. No lower extremity edema.   Psych: Appropriate affect  Neuro - Grossly intact           LABS / OTHER STUDIES:      Lab Results   Component Value Date/Time    Sodium 142 09/25/2019 12:00 AM    Potassium 4.8 09/25/2019 12:00 AM    Chloride 105 09/25/2019 12:00 AM    CO2 22 09/25/2019 12:00 AM    Anion gap 7 04/04/2019 01:44 PM    Glucose 124 (H) 09/25/2019 12:00 AM    BUN 25 09/25/2019 12:00 AM    Creatinine 1.58 (H) 09/25/2019 12:00 AM    BUN/Creatinine ratio 16 09/25/2019 12:00 AM    GFR est AA 52 (L) 09/25/2019 12:00 AM    GFR est non-AA 45 (L) 09/25/2019 12:00 AM    Calcium 9.3 09/25/2019 12:00 AM    Bilirubin, total 0.6 08/20/2019 12:00 AM    AST (SGOT) 13 08/20/2019 12:00 AM    Alk. phosphatase 60 08/20/2019 12:00 AM    Protein, total 7.0 08/20/2019 12:00 AM    Albumin 4.4 08/20/2019 12:00 AM    Globulin 4.4 (H) 02/22/2017 03:16 AM    A-G Ratio 1.7 08/20/2019 12:00 AM    ALT (SGPT) 15 08/20/2019 12:00 AM       Lab Results   Component Value Date/Time    WBC 8.8 07/17/2019 12:00 AM    HGB 14.4 07/17/2019 12:00 AM    HCT 43.2 07/17/2019 12:00 AM    PLATELET 587 72/59/0976 12:00 AM    MCV 85 07/17/2019 12:00 AM               ASSESSMENT AND PLAN:      Assessment and Plan:  PAF and history of GI bleeding  - he is being considered for a Watchman device   - Plan for LUÍS prior for ANGE sizing/morphology  - Reviewed risks (esophageal injury, resp complications, etc) and he agrees to proceed      Brennan Garg MD, [address] [place] [address], [address]                [id number] [address]  [address], [address] [place] [address], [address] [place] [address]  Ph: [phone]                                                             [phone]

## 2019-09-27 NOTE — PROGRESS NOTES
1350    TRANSFER - IN REPORT:    Verbal report received from [facility] RN on Fernanda Moon.  being received from [facility] 1st Floor for routine progression of care      Report consisted of patients Situation, Background, Assessment and   Recommendations(SBAR). Information from the following report(s) Procedure Summary was reviewed with the receiving nurse. Opportunity for questions and clarification was provided. Assessment completed upon patients arrival to unit and care assumed. 1420    Discharge instructions reviewed with patient and family. Voiced understanding. Patient given copy of discharge instructions to take home. 1500    Pt discharged via wheelchair with Kelly Lara RN. Personal belongings with patient upon discharge.

## 2019-09-27 NOTE — PROGRESS NOTES
TRANSFER - OUT REPORT:    Verbal report given to [facility] on Estil F Lacy Congress. being transferred to [facility] for routine post - op       Report consisted of patient's Situation, Background, Assessment and   Recommendations(SBAR). Information from the following report(s) SBAR was reviewed with the receiving nurse. Opportunity for questions and clarification was provided.       Patient transported with:   Monitor  Registered Nurse

## 2019-09-28 NOTE — LETTER
"Progress Note:    S: Feels a bit better  Nausea during night  Able to take in clear liquids this AM  No Pain    O:  Recent Labs     09/26/19  1641   WBC 12.8*   RBC 6.50*   HEMOGLOBIN 17.4*   HEMATOCRIT 53.2*   MCV 81.8   MCH 26.8*   MCHC 32.7*   RDW 42.6   PLATELETCT 170   MPV 12.7     Recent Labs     09/26/19  1641   SODIUM 137   POTASSIUM 3.3*   CHLORIDE 98   CO2 19*   GLUCOSE 104*   BUN 12   CREATININE 0.60   CALCIUM 9.8         Current Facility-Administered Medications   Medication Dose   • ibuprofen (MOTRIN) tablet 600 mg  600 mg   • metoclopramide (REGLAN) injection 10 mg  10 mg   • ondansetron (ZOFRAN ODT) dispertab 4 mg  4 mg   • ondansetron (ZOFRAN) syringe/vial injection 4 mg  4 mg   • promethazine (PHENERGAN) suppository 25 mg  25 mg   • famotidine (PEPCID) tablet 20 mg  20 mg    Or   • famotidine (PEPCID) injection 20 mg  20 mg   • LORazepam (ATIVAN) injection 1 mg  1 mg       PE:  /75   Pulse 80   Temp 37 °C (98.6 °F) (Temporal)   Resp 17   Ht 1.803 m (5' 11\")   Wt (!) 147.7 kg (325 lb 9.9 oz)   SpO2 93%     Intake/Output Summary (Last 24 hours) at 9/28/2019 1243  Last data filed at 9/28/2019 1200  Gross per 24 hour   Intake 3700 ml   Output 401 ml   Net 3299 ml       Abd soft    Rads:  CT-ABDOMEN-PELVIS WITH   Final Result      No acute abdominal or pelvic findings.      CT-HEAD W/O   Final Result      Normal CT scan of the head without contrast.               INTERPRETING LOCATION:  [address] [place] [address], [address]      [id number]    (Results Pending)       A: There are no active hospital problems to display for this patient.        P: Recheck labs in AM  Await MRCP  Appreciate GI input    John Ganser M.D.  [facility]  " 2017 10:56 AM 
 
Patient:  Анна Dallas. YOB: 1950 Date of Visit: 2017 Dear Fabricio Morel MD 
[address]  [address] [address] VIA In Basket Katherine Das MD 
[facility] [address] VIA Facsimile: [phone] Manasa Brown MD 
566 [facility] [phone] [facility] 99 [address] VIA In Basket 
 : Thank you for referring Mr. Ramonita Santos to me for evaluation/treatment. Below are the relevant portions of my assessment and plan of care. [facility] Note NAME:  Анна Dallas. :   1950 MRN:   [id number] PCP:  Fabricio Morel MD 
 
Date:  2017 IMPRESSION/PLAN: 
 
ICM, EF 10%, home inotrope Continue milrinone, Entresto, Coreg, and Spironolactone BP marginal today - repeat in 2 weeks and hopefully increase Entresto at that time Decrease Bumex to 1 mg qOD 
TTE after 90 days of OMM to eval for ICD Follow up in 2 weeks CAD s/p PCI Continue ASA, Plavix, Coreg and statin Claudication ABIs c/w moderate PAD bilaterally CKD Followed by renal 
BMP pending PAF Remains in NSR Continue amiodarone, Coreg H/O lower GIB Will need colonic polyp and AVM treated at some point - denies melena H/O Tobacco Abuse Pt doing well - has stopped using nicotine patch Diabetes Insulin management by PCP Diabetic neuropathy Continue neurontin 300 mg TID and Cymbalta 30 mg daily Hepatitis C Undetectable viral load, will repeat HCV RNA with next lab draw per hepatologist recommendations Subjective:  
 
Mr. Nathen Vinson is a 60 yo, C male w/ PMH CAD s/p PCI to left main and  of RCA 0n 17,  ICM (LVEF 10%), PAF, nicotine addiction, Hep C (undetectable viral load), h/o GIB w/ colonic polyp and AVM, who presents today for follow up. He reports overall he is doing better every day.   He is doing well on the milrinone and compliant with wearing his LifeVest.  He denies any shocks. He is not smoking and has stopped using the nicotine patch. He denies drug or alcohol use. He is compliant with all his medications. He is staying busy, working around his house, doing yard work, taking out [employer]. He weighs himself daily and has noticed a gradual increase in his weight, but attributes this to a \"great appetite\" and increased PO intake . He reports claudication, occasional NAVARRO, AM dizziness, cough, trace lower extremity edema. He denies CP, palpitations, orthopnea, syncope, PND. He continues to watch his Na+ intake and fluid status. ROS:  
Reports: claudication, NAVARRO, dizziness, cough, trace lower extremity edema Denies: CP, palpitations, orthopnea, syncope, PND, nausea, vomiting, early satiety, bowel or bladder changes, fevers, chills, malaise. Objective:  
 
Visit Vitals  BP 94/60 (BP 1 Location: Right arm, BP Patient Position: Sitting)  Pulse 76  Temp 97.8 °F (36.6 °C) (Oral)  Resp 20  
 Ht 5' 10\" (1.778 m)  Wt 168 lb 6.4 oz (76.4 kg)  SpO2 98%  BMI 24.16 kg/m2 Gen:   WA, WN, NAD HEENT:  EOMI, no obvious oral lesions Neck:  supple, (-) adenopathy (-) JVD 
CVS:     Distant heart sounds, S1/S2 Pul:  CTA b/l (-) r,r,w 
Abd:  +BS, soft, NT Ext:  (-) edema Neuro:   No obvious deficits Past History:  
 
Past Medical History:  
Diagnosis Date  Cardiomyopathy ([facility] 1/20/2017 A. Echo (1/19/17):  EF 5-10% with severe GHK,. Mildly dil LA. Mild TR. PASP 46.  
 Chronic renal insufficiency 3/6/2017  Diabetes mellitus ([facility] 3/6/2017  Dyslipidemia 3/6/2017 A. FLP (1/19/17): Tot 120, , HDL 19, LDL 76 (no Rx).  H/O: GI bleed 3/6/2017  Hepatitis C 3/6/2017  Paroxysmal atrial fibrillation ([facility] 3/6/2017 No family history on file. Past Surgical History:  
Procedure Laterality Date  COLONOSCOPY N/A 2/17/2017 COLONOSCOPY performed by Ivelisse Wakefield. Giovanni Ramirez MD at [facility] ENDOSCOPY Social History Substance Use Topics  Smoking status: Former Smoker Packs/day: 2.00 Years: 45.00 Quit date: 1/20/2017  Smokeless tobacco: Not on file  Alcohol use No  
  
 
No family history on file. Allergies: Allergies Allergen Reactions  Heparin (Porcine) Unknown (comments) Data Review:  
 
 
CMP:  
Lab Results Component Value Date/Time Glucose 204 02/27/2017 01:30 PM  
 Sodium 134 02/27/2017 01:30 PM  
 Potassium 4.6 02/27/2017 01:30 PM  
 Chloride 93 02/27/2017 01:30 PM  
 CO2 22 02/27/2017 01:30 PM  
 BUN 31 02/27/2017 01:30 PM  
 Creatinine 1.64 02/27/2017 01:30 PM  
 Calcium 9.2 02/27/2017 01:30 PM  
 Anion gap 9 02/22/2017 03:16 AM  
 BUN/Creatinine ratio 19 02/27/2017 01:30 PM  
 Bilirubin, total 0.5 02/22/2017 03:16 AM  
 ALT (SGPT) 21 02/22/2017 03:16 AM  
 AST (SGOT) 14 02/22/2017 03:16 AM  
 Alk. phosphatase 99 02/22/2017 03:16 AM  
 Protein, total 7.3 02/22/2017 03:16 AM  
  02/14/2017 03:03 AM  
 Albumin 2.9 02/22/2017 03:16 AM  
 Globulin 4.4 02/22/2017 03:16 AM  
 A-G Ratio 0.7 02/22/2017 03:16 AM  
, PHOS:  
Lab Results Component Value Date/Time Phosphorus 2.5 01/23/2017 12:09 PM  
 
 
Medications reviewed: 
 
Current Outpatient Prescriptions Medication Sig  bumetanide (BUMEX) 2 mg tablet Take 0.5 Tabs by mouth daily.  gabapentin (NEURONTIN) 300 mg capsule Take 900 mg by mouth three (3) times daily.  DULoxetine (CYMBALTA) 30 mg capsule Take 1 Cap by mouth daily.  sacubitril-valsartan (ENTRESTO) 24 mg/26 mg tablet Take 1 Tab by mouth two (2) times a day.  amiodarone (CORDARONE) 200 mg tablet Take 1 Tab by mouth every twelve (12) hours.  aspirin delayed-release 81 mg tablet Take 1 Tab by mouth daily.  atorvastatin (LIPITOR) 10 mg tablet Take 1 Tab by mouth daily.  carvedilol (COREG) 3.125 mg tablet Take 1 Tab by mouth two (2) times daily (with meals).  clopidogrel (PLAVIX) 75 mg tab Take 1 Tab by mouth daily.  magnesium oxide (MAG-OX) 400 mg tablet Take 1 Tab by mouth daily.  spironolactone (ALDACTONE) 25 mg tablet Take 1 Tab by mouth daily.  milrinone (PRIMACOR) 20 mg/100 mL (200 mcg/mL) infusion 28.425 mcg/min by IntraVENous route continuous.  insulin NPH/insulin regular (NOVOLIN 70/30, HUMULIN 70/30) 100 unit/mL (70-30) injection 10 units w/ breakfast 
8 units w/ dinner  Insulin Syringes, Disposable, 1 mL syrg Pt to take 10 units w/ breakfast and 8 units w/ dinner  nicotine (NICODERM CQ) 7 mg/24 hr 1 Patch by TransDERmal route every twenty-four (24) hours for 30 days. No current facility-administered medications for this visit. Follow-up Disposition: 
Return in about 2 weeks (around 4/25/2017). Thank you for letting us see Mr Dustin Velazquez with you, Smith Sanchez, [facility] 1721 If you have questions, please do not hesitate to call me. I look forward to following Mr. Dustin Velazquez along with you. Sincerely, Nii Salmeron MD

## 2019-10-22 NOTE — DISCHARGE INSTRUCTIONS
[address]  [address] [facility], [address] [place] [address]    EGD/COLON DISCHARGE INSTRUCTIONS    Marvelyn Goldmann  [id number]  1950    Discomfort:  Sore throat- throat lozenges or warm salt water gargle  redness at IV site- apply warm compress to area; if redness or soreness persist- contact your physician  Gaseous discomfort- walking, belching will help relieve any discomfort  You may not operate a vehicle for 12 hours  You may not engage in an occupation involving machinery or appliances for rest of today  You may not drink alcoholic beverages for at least 12 hours  Avoid making any critical decisions for at least 24 hour  DIET  You may resume your regular diet - however -  remember your colon is empty and a heavy meal will produce gas. Avoid these foods:  vegetables, fried / greasy foods, carbonated drinks    ACTIVITY  You may resume your normal daily activities   Spend the remainder of the day resting -  avoid any strenuous activity. CALL M.D. ANY SIGN OF   Increasing pain, nausea, vomiting  Abdominal distension (swelling)  New increased bleeding (oral or rectal)  Fever (chills)  Pain in chest area  Bloody discharge from nose or mouth  Shortness of breath    Follow-up Instructions:   Call Dr. Leonila Duran for any questions or problems. Telephone # [phone]    ENDOSCOPY FINDINGS:   Your endoscopy was normal. Your colonoscopy showed one small polyp, which was removed. We will contact you about the pathology results. Your next colonoscopy will be due in 3 years. Please maintain a high fiber diet. Your may resume your Plavix on Friday. Signed By: Bladimir Valdez. Mackenzie Moore MD     10/22/2019  2:46 PM     Patient Education        Colon Polyps: Care Instructions  Your Care Instructions    Colon polyps are growths in the colon or the rectum. The cause of most colon polyps is not known, and most people who get them do not have any problems. But a certain kind can turn into cancer.  For this reason, regular testing for colon polyps is important for people as they get older. It is also important for anyone who has an increased risk for colon cancer. Polyps are usually found through routine colon cancer screening tests. Although most colon polyps are not cancerous, they are usually removed and then tested for cancer. Screening for colon cancer saves lives because the cancer can usually be cured if it is caught early. If you have a polyp that is the type that can turn into cancer, you may need more tests to examine your entire colon. The doctor will remove any other polyps that he or she finds, and you will be tested more often. Follow-up care is a key part of your treatment and safety. Be sure to make and go to all appointments, and call your doctor if you are having problems. It's also a good idea to know your test results and keep a list of the medicines you take. How can you care for yourself at home? Regular exams to look for colon polyps are the best way to prevent polyps from turning into colon cancer. These can include stool tests, sigmoidoscopy, colonoscopy, and CT colonography. Talk with your doctor about a testing schedule that is right for you. To prevent polyps  There is no home treatment that can prevent colon polyps. But these steps may help lower your risk for cancer. · Stay active. Being active can help you get to and stay at a healthy weight. Try to exercise on most days of the week. Walking is a good choice. · Eat well. Choose a variety of vegetables, fruits, legumes (such as peas and beans), fish, poultry, and whole grains. · Do not smoke. If you need help quitting, talk to your doctor about stop-smoking programs and medicines. These can increase your chances of quitting for good. · If you drink alcohol, limit how much you drink. Limit alcohol to 2 drinks a day for men and 1 drink a day for women. When should you call for help?   Call your doctor now or seek immediate medical care if:    · You have severe belly pain.     · Your stools are maroon or very bloody.    Watch closely for changes in your health, and be sure to contact your doctor if:    · You have a fever.     · You have nausea or vomiting.     · You have a change in bowel habits (new constipation or diarrhea).     · Your symptoms get worse or are not improving as expected. Where can you learn more? Go to http://erik-dianna.info/. Enter 95 [id number] in the search box to learn more about \"Colon Polyps: Care Instructions. \"  Current as of: December 19, 2018  Content Version: 12.2  © 4531-5025 [employer]. Care instructions adapted under license by [employer] (which disclaims liability or warranty for this information). If you have questions about a medical condition or this instruction, always ask your healthcare professional. [address] any warranty or liability for your use of this information.

## 2019-10-22 NOTE — ANESTHESIA PREPROCEDURE EVALUATION
Anesthetic History No history of anesthetic complications Review of Systems / Medical History Patient summary reviewed, nursing notes reviewed and pertinent labs reviewed Pulmonary COPD Neuro/Psych Within defined limits Cardiovascular CHF Dysrhythmias : atrial fibrillation CAD and cardiac stents GI/Hepatic/Renal 
Within defined limits Endo/Other Diabetes Other Findings Physical Exam 
 
Airway Mallampati: II 
TM Distance: > 6 cm Neck ROM: normal range of motion Mouth opening: Normal 
 
 Cardiovascular Regular rate and rhythm,  S1 and S2 normal,  no murmur, click, rub, or gallop Dental 
No notable dental hx Pulmonary Breath sounds clear to auscultation Abdominal 
GI exam deferred Other Findings Anesthetic Plan ASA: 3 Anesthesia type: MAC Induction: Intravenous Anesthetic plan and risks discussed with: Patient

## 2019-10-22 NOTE — ROUTINE PROCESS
Ariadne Tracey  1950  [id number]    Situation:  Verbal report received from: WILLIAM Garrison  Procedure: Procedure(s) with comments:  COLONOSCOPY  ESOPHAGOGASTRODUODENOSCOPY (EGD)  ENDOSCOPIC POLYPECTOMY  RESOLUTION CLIP - x1 clip Lot # [id number] expires 6/26/2022    Background:    Preoperative diagnosis: HX OF COLON POLYPS, HX OF DUODENUM POLYPS  Postoperative diagnosis: 1. - Normal EGD  2. - Diverticulosis  3. - Rectal Polyp  4. - Internal Hemorrhoids    :  Dr. Gina Christie  Assistant(s): Endoscopy Technician-1: Arsen Monge  Endoscopy RN-1: Dhara Amaral RN    Specimens:   ID Type Source Tests Collected by Time Destination   1 : Rectal Polyp Preservative   Pita Baez MD 10/22/2019 1441 Pathology     H. Pylori  no    Assessment:  Intra-procedure medications       Anesthesia gave intra-procedure sedation and medications, see anesthesia flow sheet yes    Intravenous fluids:    300  NS @ KVO     Vital signs stable yes    Abdominal assessment: round and soft yes    Recommendation:  Discharge patient per MD order yes.   Return to floor no  Family or Friend : wife  Permission to share finding with family or friend yes

## 2019-10-22 NOTE — PROCEDURES
[address]  174 [facility], [address] [place] [address]        Colonoscopy Operative Report    Felton Tamayo  [id number]  1950      Procedure Type:   Colonoscopy with polypectomy (snare cautery)     Indications:    Personal history of colon polyps (screening only)         Pre-operative Diagnosis: see indication above    Post-operative Diagnosis:  See findings below    :  Raffi Mccain. Olivia Moore MD      Referring Provider: Corey Garcia MD      Sedation:  MAC anesthesia Propofol      Procedure Details:  After informed consent was obtained with all risks and benefits of procedure explained and preoperative exam completed, the patient was taken to the endoscopy suite and placed in the left lateral decubitus position. Upon sequential sedation as per above, a digital rectal exam was performed demonstrating internal hemorrhoids. The Olympus pediatric videocolonoscope  was inserted in the rectum and carefully advanced to the cecum, which was identified by the ileocecal valve and appendiceal orifice. The cecum was identified by the ileocecal valve and appendiceal orifice. The quality of preparation was good. The colonoscope was slowly withdrawn with careful evaluation between folds. Retroflexion in the rectum was completed . Findings:   Rectum: single sessile 6-7 mm polyp remove with hot snare (proximal rectum). A single Resolution 360 clip was placed to close mucosal defect  Sigmoid: mild diverticulosis  Descending Colon: normal  Transverse Colon: normal  Ascending Colon: normal  Cecum: normal  Terminal Ileum: not intubated      Specimen Removed:  1. Rectal polyp    Complications: None. EBL:  None. Impression:     1. Rectal polyp - removed  2. Mild sigmoid diverticulosis  3. Small internal hemorrhoids    Recommendations:  1. Follow up surgical pathology  2. Repeat colonoscopy in 3 years  3. High fiber diet education  4.  Restart Plavix on 10/25/19    Signed By: Raffi Mccain. Gretel Edgar MD     10/22/2019  2:50 PM

## 2019-10-22 NOTE — PROGRESS NOTES

## 2019-10-22 NOTE — PROCEDURES
[facility], [address] [place] [address]          Esophago- Gastroduodenoscopy (EGD) Procedure Note    Dinesh Montiel  1950  [id number]      Procedure: Endoscopic Gastroduodenoscopy --diagnostic    Indication: history of multiple colon tubular adenomas    Pre-operative Diagnosis: see indication above    Post-operative Diagnosis: see findings below    : Zuleima Mejía. Valentine Qiu MD    Referring Provider:  Louie Whipple MD      Anesthesia/Sedation:  MAC anesthesia Propofol        Procedure Details     After informed consent was obtained for the procedure, with all risks and benefits of procedure explained the patient was taken to the endoscopy suite and placed in the left lateral decubitus position. Following sequential administration of sedation as per above, the endoscope was inserted into the mouth and advanced under direct vision to second portion of the duodenum. A careful inspection was made as the gastroscope was withdrawn, including a retroflexed view of the proximal stomach; findings and interventions are described below. Findings:   Esophagus:normal  Stomach: normal   Duodenum: normal, ampulla could not be visualized and was likely hidden underneath a fold      Therapies:  none    Specimens: none         EBL: None      Complications:   None; patient tolerated the procedure well. Impression:    -Normal upper endoscopy, with no endoscopic evidence of neoplasia or mucosal abnormality. Recommendations:  Proceed to colonoscopy    Signed By: Zuleima Mejía.  Valentine Qiu MD     10/22/2019  2:48 PM

## 2019-10-22 NOTE — H&P
[address]  174 [facility], [address] [place] [address]      History and Physical       NAME:  Ezra Reyes. :   1950   MRN:   [id number]             History of Present Illness:  Patient is a 79 y.o. who is seen for a history of colon polyps. Last colonoscopy was performed last year at which time 14 polyps were removed. He presents for colonoscopy and EGD (to rule out duodenal polyps). PMH:  Past Medical History:   Diagnosis Date    Arthritis     hands/fingers    CAD (coronary artery disease)     involving native coronary artery of native heart without angina pectoris    Cardiomyopathy ([facility] 2017    A. Echo (17):  EF 5-10% with severe GHK,. Mildly dil LA. Mild TR. PASP 46./systemic cardiomyopathy    Chronic kidney disease     Chronic obstructive pulmonary disease (HCC)     Chronic renal insufficiency 2017    Chronic systolic congestive heart failure, NYHA class 3 ([address] 2019    Claudication ([address] 10/9/2018    Congestive heart failure ([address]     Diabetes mellitus ([address] 3/6/2017    IDDM    Dyslipidemia 3/6/2017    A. FLP (17): Tot 120, , HDL 19, LDL 76 (no Rx).  H/O: GI bleed 3/6/2017    Hepatitis C 3/6/2017    Hypokalemia     ICD (implantable cardioverter-defibrillator) in place     Ill-defined condition     flu 2018     Neuropathy 2018    Noncompliance with medication regimen 2018    Paroxysmal atrial fibrillation ([address] 3/6/2017    Peripheral vascular disease ([facility] 2017    A. RICKY (3/20/17): Right 0.58, Left 0.56.  Tobacco dependence syndrome 10/9/2018    Vitamin D deficiency 2018       PSH:  Past Surgical History:   Procedure Laterality Date    CARDIAC SURG PROCEDURE UNLIST      CORONARY STENT    COLONOSCOPY N/A 2017    COLONOSCOPY performed by Claudia Morfin. Sofya Ramirez MD at [address] COLONOSCOPY N/A 2018    COLONOSCOPY performed by Claudia Morfin.  Sofya Ramirez MD at [facility] ENDOSCOPY    HX CORONARY STENT PLACEMENT      LM, RCA x 2     HX GI      colonoscopy x 3 - last 2/2017 - polyp    HX IMPLANTABLE CARDIOVERTER DEFIBRILLATOR      HX OTHER SURGICAL      HX PACEMAKER      AICD       Allergies: Allergies   Allergen Reactions    Heparin (Porcine) Other (comments)     Was told when in the hospital that if he received heparin again that it would be deadly. Home Medications:  Prior to Admission Medications   Prescriptions Last Dose Informant Patient Reported? Taking? Insulin Syringes, Disposable, 1 mL syrg 10/20/2019  No No   Sig: Pt to take 10 units w/ breakfast and 8 units w/ dinner   acetaminophen (TYLENOL EXTRA STRENGTH) 500 mg tablet Unknown at Unknown time  Yes No   Sig: Take 1,000 mg by mouth every six (6) hours as needed for Pain. Indications: BACK PAIN   aspirin 81 mg chewable tablet 10/20/2019  Yes No   Sig: Take 81 mg by mouth daily. atorvastatin (LIPITOR) 40 mg tablet 10/20/2019  No No   Sig: TAKE ONE TABLET BY MOUTH ONCE DAILY  Indications: excessive fat in the blood   bisacodyl (DULCOLAX, BISACODYL,) 5 mg EC tablet 10/20/2019  Yes Yes   Sig: Take 5 mg by mouth daily as needed for Constipation. cholecalciferol (VITAMIN D3) 5,000 unit capsule 10/20/2019  No No   Sig: Take 1 Cap by mouth daily. clopidogrel (PLAVIX) 75 mg tab 10/10/2019  Yes No   Sig: Take  by mouth. insulin NPH/insulin regular (NOVOLIN 70/30 U-100 INSULIN) 100 unit/mL (70-30) injection 10/20/2019  No No   Sig: INJECT 10 UNITS SUBCUTANEOUSLY IN THE MORNING AND 8 UNIT IN THE EVENING   Patient taking differently: No sig reported   loratadine (CLARITIN) 10 mg tablet 10/20/2019  Yes No   Sig: Take 10 mg by mouth daily. magnesium citrate solution 10/20/2019  Yes Yes   Sig: Take 296 mL by mouth now.   metoprolol succinate (TOPROL-XL) 50 mg XL tablet 10/20/2019  No No   Sig: Take 1 Tab by mouth daily.    potassium chloride (K-DUR, KLOR-CON) 20 mEq tablet 10/20/2019  No No   Sig: TAKE 1 TABLET BY MOUTH TWICE DAILY sacubitril-valsartan (ENTRESTO) 97 mg/103 mg tablet 10/20/2019  No No   Sig: Take 1 Tab by mouth two (2) times a day. torsemide (DEMADEX) 20 mg tablet 10/20/2019  No No   Sig: Take 0.5 Tabs by mouth as needed (for shortness of breath or edema). FYI dose decrease by JING Nicole NP on 19   umeclidinium-vilanterol (ANORO ELLIPTA) 62.5-25 mcg/actuation inhaler 10/20/2019  No No   Sig: Take 1 Puff by inhalation daily. Facility-Administered Medications: None       Hospital Medications:  No current facility-administered medications for this encounter.         Social History:  Social History     Tobacco Use    Smoking status: Current Some Day Smoker     Packs/day: 1.00     Years: 45.00     Pack years: 45.00     Last attempt to quit: 2017     Years since quittin.7    Smokeless tobacco: Never Used    Tobacco comment: using Nicotine patches   Substance Use Topics    Alcohol use: No       Family History:  Family History   Problem Relation Age of Onset    Hypertension Mother     Heart Disease Mother         MURMUR    Cancer Father         COLON    Colon Cancer Father     Other Sister         born totally handicapped/epileptic    Kidney Disease Sister          from renal shutdown    Heart Disease Brother     Other Brother         ?pancreatic cancer or ?pancreatitis    Cancer Maternal Uncle         toes and spread    Cancer Paternal Uncle         stomach    Heart Attack Maternal Grandfather 47    Cancer Paternal Grandfather         bone    Cancer Maternal Uncle         stomach    Cancer Maternal Uncle         lung    Anesth Problems Neg Hx              Review of Systems:      Constitutional: negative fever, negative chills, negative weight loss  Eyes:   negative visual changes  ENT:   negative sore throat, tongue or lip swelling  Respiratory:  negative cough, negative dyspnea  Cards:  negative for chest pain, palpitations, lower extremity edema  GI:   See HPI  :  negative for frequency, dysuria  Integument:  negative for rash and pruritus  Heme:  negative for easy bruising and gum/nose bleeding  Musculoskel: negative for myalgias,  back pain and muscle weakness  Neuro: negative for headaches, dizziness, vertigo  Psych:  negative for feelings of anxiety, depression       Objective:     Patient Vitals for the past 8 hrs:   BP Temp Pulse Resp SpO2 Weight   10/22/19 1324 124/60 97.7 °F (36.5 °C) 77 16 95 % 92.3 kg (203 lb 8 oz)     No intake/output data recorded. No intake/output data recorded. EXAM:     NEURO-a&o   HEENT-wnl   LUNGS-clear    COR-regular rate and rhythym     ABD-soft , no tenderness, no rebound, good bs     EXT-no edema     Data Review     No results for input(s): WBC, HGB, HCT, PLT, HGBEXT, HCTEXT, PLTEXT in the last 72 hours. No results for input(s): NA, K, CL, CO2, BUN, CREA, GLU, PHOS, CA in the last 72 hours. No results for input(s): SGOT, GPT, AP, TBIL, TP, ALB, GLOB, GGT, AML, LPSE in the last 72 hours. No lab exists for component: AMYP, HLPSE  No results for input(s): INR, PTP, APTT, INREXT in the last 72 hours.        Assessment:   · History of colon polyps     Patient Active Problem List   Diagnosis Code    NAVARRO (dyspnea on exertion) R06.09    Ischemic cardiomyopathy I25.5    Paroxysmal atrial fibrillation (HCC) I48.0    H/O: GI bleed Z87.19    Hepatitis C B19.20    Chronic renal insufficiency N18.9    Dyslipidemia E78.5    Coronary artery disease involving native heart without angina pectoris Z81.01    Systolic heart failure, ACC/AHA stage C (HCC) I50.20    Type 2 diabetes mellitus with complication (HCC) N67.3    Peripheral vascular disease (HCC) I73.9    ICD (implantable cardioverter-defibrillator) in place Z95.810    Type 2 diabetes mellitus with nephropathy (HCC) E11.21    Hypokalemia E87.6    Polyp of colon K63.5    Advance care planning Z71.89    Vitamin D deficiency E55.9    Proteinuria R80.9    Noncompliance with medication regimen Z91.14    Claudication ([facility] I73.9    Tobacco dependence syndrome F17.200    Neuropathy G62.9    Type 2 diabetes mellitus with diabetic neuropathy (HCC) E11.40    Chronic systolic congestive heart failure, NYHA class 3 (HCC) I50.22    Vitamin deficiency E56.9     Plan:   · Endoscopic procedure with MAC     Signed By: Dino Mitchell.  Alex Engel MD     10/22/2019  2:10 PM

## 2019-10-23 NOTE — ANESTHESIA POSTPROCEDURE EVALUATION
Procedure(s): 
COLONOSCOPY 
ESOPHAGOGASTRODUODENOSCOPY (EGD) ENDOSCOPIC POLYPECTOMY RESOLUTION CLIP. MAC Anesthesia Post Evaluation Patient location during evaluation: PACU Patient participation: complete - patient participated Level of consciousness: awake Pain management: adequate Airway patency: patent Anesthetic complications: no 
Cardiovascular status: hemodynamically stable Respiratory status: acceptable Hydration status: acceptable Comments: I have seen and evaluated the patient. The patient is ready for PACU discharge. [address], DO Vitals Value Taken Time /69 10/22/2019  3:19 PM  
Temp Pulse 74 10/22/2019  3:23 PM  
Resp 0 10/22/2019  3:29 PM  
SpO2 96 % 10/22/2019  3:15 PM  
Vitals shown include unvalidated device data.

## 2019-11-14 NOTE — PERIOP NOTES
PT INSTRUCTED TO CALL SURGEON'S OFFICE FOR PRE OP MEDICATION DIRECTIONS.     DIRECTIONS TO HAVE CXR COMPLETED GIVEN TO PT

## 2019-11-18 NOTE — PROGRESS NOTES
Called and spoke to patient. (Estil ) Patient states understanding of lab results per Dr Ronald Boggs: A1c high will f/u 11/26/19.

## 2019-11-21 PROBLEM — Z95.818 PRESENCE OF WATCHMAN LEFT ATRIAL APPENDAGE CLOSURE DEVICE: Status: ACTIVE | Noted: 2019-01-01

## 2019-11-21 NOTE — TELEPHONE ENCOUNTER
Patient called and stated he had the watchmen procedure done today and the doctor noticed a small blood clot. The doctor prescribed eliquis 5mg twice daily. Patient is currently on plavix and did not know if he should continue both medications. I spoke to Rosi Abdullahi NP and she stated is ok to take both plavix and eliquis. I called patient back and explained. He expressed understanding.

## 2019-11-21 NOTE — ROUTINE PROCESS
Cardiac Cath Lab Recovery Arrival Note:      Tej Dress. arrived to Cardiac Cath Lab, Recovery Area. Staff introduced to patient. Patient identifiers verified with NAME and DATE OF BIRTH. Procedure verified with patient. Consent forms reviewed and signed by patient or authorized representative and verified. Allergies verified. Patient and family oriented to department. Patient and family informed of procedure and plan of care. Questions answered with review. Patient prepped for procedure, per orders from physician, prior to arrival.    Patient on cardiac monitor, non-invasive blood pressure, SPO2 monitor. On room air. Patient is A&Ox 3. Patient reports no CP. Patient in stretcher, in low position, with side rails up, call bell within reach, patient instructed to call if assistance as needed.     Patient prep in: [facility] Recovery Area, [address]  Prep by: DON

## 2019-11-21 NOTE — H&P
HISTORY OF PRESENTING ILLNESS      Fernanda Reyes. is a 79 y.o. male with ICM, LVEF 25%, CKD, PAF (Hx GIB), DM, Hepatitis C, CAD s/p  PCI, single chamber [employer] ICD presenting for follow-up to discuss possible EKG changes on a recent EKG performed during a heart failure clinic visit.  His EKG showed sinus rhythm with an incomplete left bundle branch block.  Previous EKGs show similar findings.  His amiodarone was discontinued recently. [place] been on amiodarone for atrial fibrillation.   He has a history of GI bleeding in the past which is precluded anticoagulation.         ACTIVE PROBLEM LIST           Patient Active Problem List     Diagnosis Date Noted    Vitamin deficiency 01/29/2019    Chronic systolic congestive heart failure, NYHA class 3 ([address] 01/08/2019    Type 2 diabetes mellitus with diabetic neuropathy ([address] 12/06/2018    Neuropathy 11/19/2018    Claudication ([address] 10/09/2018    Tobacco dependence syndrome 10/09/2018    Noncompliance with medication regimen 09/11/2018    Proteinuria 06/06/2018    Vitamin D deficiency 06/04/2018    Polyp of colon 03/05/2018    Advance care planning 03/05/2018    Type 2 diabetes mellitus with nephropathy ([address] 01/11/2018    Hypokalemia 01/11/2018    ICD (implantable cardioverter-defibrillator) in place 12/04/2017    Peripheral vascular disease ([address] 09/18/2017    Coronary artery disease involving native heart without angina pectoris 72/01/0746    Systolic heart failure, ACC/AHA stage C ([address] 08/17/2017    Type 2 diabetes mellitus with complication ([address] 56/30/0737    Paroxysmal atrial fibrillation ([address] 03/06/2017    H/O: GI bleed 03/06/2017    Hepatitis C 03/06/2017    Chronic renal insufficiency 03/06/2017    Dyslipidemia 03/06/2017    Ischemic cardiomyopathy 01/20/2017    NAVARRO (dyspnea on exertion) 01/19/2017             PAST MEDICAL HISTORY           Past Medical History:   Diagnosis Date    Arthritis       hands/fingers    CAD (coronary artery disease)       involving native coronary artery of native heart without angina pectoris    Cardiomyopathy ([address] 01/20/2017     A. Echo (1/19/17):  EF 5-10% with severe GHK,. Mildly dil LA. Mild TR. PASP 46./systemic cardiomyopathy    Chronic kidney disease      Chronic obstructive pulmonary disease (HCC)      Chronic renal insufficiency 03/06/2017    Chronic systolic congestive heart failure, NYHA class 3 ([address] 1/8/2019    Claudication ([address] 10/9/2018    Congestive heart failure ([address]      Diabetes mellitus ([address] 3/6/2017     IDDM    Dyslipidemia 3/6/2017     A. FLP (1/19/17): Tot 120, , HDL 19, LDL 76 (no Rx).  H/O: GI bleed 3/6/2017    Hepatitis C 3/6/2017    Hypokalemia      ICD (implantable cardioverter-defibrillator) in place      Ill-defined condition       flu 1/2018     Neuropathy 11/19/2018    Noncompliance with medication regimen 9/11/2018    Paroxysmal atrial fibrillation ([address] 3/6/2017    Peripheral vascular disease ([address] 9/18/2017     A. RICKY (3/20/17): Right 0.58, Left 0.56.  Tobacco dependence syndrome 10/9/2018    Vitamin D deficiency 6/4/2018             PAST SURGICAL HISTORY            Past Surgical History:   Procedure Laterality Date    COLONOSCOPY N/A 2/17/2017     COLONOSCOPY performed by Raffi Mccain. Olivia Moore MD at [facility] ENDOSCOPY    COLONOSCOPY N/A 2/5/2018     COLONOSCOPY performed by Raffi Mccain.  Olivia Moore MD at [facility] ENDOSCOPY    HX CORONARY STENT PLACEMENT         LM, RCA x 2     HX GI         colonoscopy x 3 - last 2/2017 - polyp    HX IMPLANTABLE CARDIOVERTER DEFIBRILLATOR                 ALLERGIES            Allergies   Allergen Reactions    Heparin (Porcine) Other (comments)       Was told when in the hospital that if he received heparin again that it would be deadly.            FAMILY HISTORY            Family History   Problem Relation Age of Onset    Hypertension Mother      Heart Disease Mother       MURMUR    Cancer Father           COLON    Colon Cancer Father      Other Sister           born totally handicapped/epileptic    Kidney Disease Sister            from renal shutdown    Heart Disease Brother      Other Brother           ?pancreatic cancer or ?pancreatitis    Cancer Maternal Uncle           toes and spread    Cancer Paternal Uncle           stomach    Heart Attack Maternal Grandfather 47    Cancer Paternal Grandfather           bone    Cancer Maternal Uncle           stomach    Cancer Maternal Uncle           lung    Anesth Problems Neg Hx      negative for cardiac disease         SOCIAL HISTORY      Social History               Socioeconomic History    Marital status:        Spouse name: Not on file    Number of children: Not on file    Years of education: Not on file    Highest education level: Not on file   Tobacco Use    Smoking status: Current Some Day Smoker       Packs/day: 0.50       Years: 45.00       Pack years: 22.50       Last attempt to quit: 2017       Years since quittin.6    Smokeless tobacco: Never Used    Tobacco comment: using Nicotine patches   Substance and Sexual Activity    Alcohol use: No    Drug use: Yes       Types: Marijuana    Sexual activity: Never       Partners: Female               MEDICATIONS           Current Outpatient Medications   Medication Sig    torsemide (DEMADEX) 20 mg tablet Take 0.5 Tabs by mouth daily.  cholecalciferol (VITAMIN D3) 5,000 unit capsule Take 1 Cap by mouth daily.  sacubitril-valsartan (ENTRESTO) 97 mg/103 mg tablet Take 1 Tab by mouth two (2) times a day.  umeclidinium-vilanterol (ANORO ELLIPTA) 62.5-25 mcg/actuation inhaler Take 1 Puff by inhalation daily.  potassium chloride (K-DUR, KLOR-CON) 20 mEq tablet Take 1 Tab by mouth three (3) times daily.  (Patient taking differently: Take 20 mEq by mouth two (2) times a day.)    metoprolol succinate (TOPROL-XL) 100 mg tablet Take 0.5 Tabs by mouth daily for 360 days. Indications: chronic heart failure    atorvastatin (LIPITOR) 40 mg tablet TAKE ONE TABLET BY MOUTH ONCE DAILY  Indications: excessive fat in the blood    insulin NPH/insulin regular (NOVOLIN 70/30 U-100 INSULIN) 100 unit/mL (70-30) injection INJECT 10 UNITS SUBCUTANEOUSLY IN THE MORNING AND 8 UNIT IN THE EVENING (Patient taking differently: INJECT 80  UNITS SUBCUTANEOUSLY IN THE MORNING AND 20 UNITS IN THE EVENING)    aspirin 81 mg chewable tablet Take 81 mg by mouth daily.  acetaminophen (TYLENOL EXTRA STRENGTH) 500 mg tablet Take 1,000 mg by mouth every six (6) hours as needed for Pain. Indications: BACK PAIN    Insulin Syringes, Disposable, 1 mL syrg Pt to take 10 units w/ breakfast and 8 units w/ dinner      No current facility-administered medications for this visit.          I have reviewed the nurses notes, vitals, problem list, allergy list, medical history, family, social history and medications.         REVIEW OF SYMPTOMS      General: Pt denies excessive weight gain or loss. Pt is able to conduct ADL's  HEENT: Denies blurred vision, headaches, hearing loss, epistaxis and difficulty swallowing. Respiratory: Denies cough, congestion, shortness of breath, NAVARRO, wheezing or stridor. Cardiovascular: Denies precordial pain, palpitations, edema or PND  Gastrointestinal: Denies poor appetite, indigestion, abdominal pain or blood in stool  Genitourinary: Denies hematuria, dysuria, increased urinary frequency  Musculoskeletal: Denies joint pain or swelling from muscles or joints  Neurologic: Denies tremor, paresthesias, headache, or sensory motor disturbance  Psychiatric: Denies confusion, insomnia, depression  Integumentray: Denies rash, itching or ulcers. Hematologic: Denies easy bruising, bleeding         PHYSICAL EXAMINATION      There were no vitals filed for this visit. General: Well developed, in no acute distress.   HEENT: No jaundice, oral mucosa moist, no oral ulcers  Neck: Supple, no stiffness, no lymphadenopathy, supple  Heart:  Normal S1/S2 negative S3 or S4. Regular, no murmur, gallop or rub, no jugular venous distention  Respiratory: Clear bilaterally x 4, no wheezing or rales  Abdomen:   Soft, non-tender, bowel sounds are active.   Extremities:  No edema, normal cap refill, no cyanosis. Musculoskeletal: No clubbing, no deformities  Neuro: A&Ox3, speech clear, gait stable, cooperative, no focal neurologic deficits  Skin: Skin color is normal. No rashes or lesions. Non diaphoretic, moist.  Vascular: 2+ pulses symmetric in all extremities         DIAGNOSTIC DATA      EKG:          LABORATORY DATA            Lab Results   Component Value Date/Time     WBC 8.8 07/17/2019 12:00 AM     HGB 14.4 07/17/2019 12:00 AM     HCT 43.2 07/17/2019 12:00 AM     PLATELET 920 40/04/2092 12:00 AM     MCV 85 07/17/2019 12:00 AM            Lab Results   Component Value Date/Time     Sodium 140 08/20/2019 12:00 AM     Potassium 4.6 08/20/2019 12:00 AM     Chloride 96 08/20/2019 12:00 AM     CO2 22 08/20/2019 12:00 AM     Anion gap 7 04/04/2019 01:44 PM     Glucose 227 (H) 08/20/2019 12:00 AM     BUN 25 08/20/2019 12:00 AM     Creatinine 2.00 (H) 08/20/2019 12:00 AM     BUN/Creatinine ratio 13 08/20/2019 12:00 AM     GFR est AA 39 (L) 08/20/2019 12:00 AM     GFR est non-AA 34 (L) 08/20/2019 12:00 AM     Calcium 9.8 08/20/2019 12:00 AM     Bilirubin, total 0.6 08/20/2019 12:00 AM     AST (SGOT) 13 08/20/2019 12:00 AM     Alk. phosphatase 60 08/20/2019 12:00 AM     Protein, total 7.0 08/20/2019 12:00 AM     Albumin 4.4 08/20/2019 12:00 AM     Globulin 4.4 (H) 02/22/2017 03:16 AM     A-G Ratio 1.7 08/20/2019 12:00 AM     ALT (SGPT) 15 08/20/2019 12:00 AM             ASSESSMENT      1. Cardiomyopathy                          D. Ischemic                        B. NYHA class II  2. Atrial fibrillation                        A. Paroxysmal  3. CAD, native  4.  Percutaneous transluminal angioplasty  5. ICD                         [address]                        A. Single chamber  6. History of GI bleed  7.  Incomplete LBBB         PLAN      Watchman        Courtney Grimes MD  Cardiac Electrophysiology / Cardiology     [address] [place] [address], [id number] [place] [address], [address]  [address]  [phone] / [phone] Fax                                    [phone] / [phone] Fax

## 2019-11-21 NOTE — ANESTHESIA POSTPROCEDURE EVALUATION
Post-Anesthesia Evaluation and Assessment Patient: Kelvin Moss MRN: [id number]  SSN: [id number] YOB: 1950  Age: 79 y.o. Sex: male I have evaluated the patient and they are stable and ready for discharge from the PACU. Cardiovascular Function/Vital Signs Visit Vitals /62 (BP 1 Location: Right arm, BP Patient Position: At rest) Pulse 73 Temp 36.7 °C (98 °F) Resp 16 Ht 5' 10\" (1.778 m) Wt 92.1 kg (203 lb) SpO2 96% BMI 29.13 kg/m² Patient is status post * No anesthesia type entered * anesthesia for Procedure(s): 
Ep Procedure Cancelled. Nausea/Vomiting: None Postoperative hydration reviewed and adequate. Pain: 
Pain Scale 1: Numeric (0 - 10) (11/21/19 0932) Pain Intensity 1: 0 (11/21/19 0932) Managed Neurological Status: At baseline Mental Status, Level of Consciousness: Alert and  oriented to person, place, and time Pulmonary Status:  
O2 Device: Room air (11/21/19 0932) Adequate oxygenation and airway patent Complications related to anesthesia: None Post-anesthesia assessment completed. No concerns Signed By: Precious Levy MD   
 November 21, 2019 Procedure(s): 
Ep Procedure Cancelled. general 
 
<BSHSIANPOST> Vitals Value Taken Time /47 11/21/2019  9:48 AM  
Temp Pulse 69 11/21/2019 10:00 AM  
Resp SpO2 90 % 11/21/2019 10:00 AM  
Vitals shown include unvalidated device data.

## 2019-11-21 NOTE — Clinical Note
Patient transported with a Registered Nurse. Patient transported to: [facility]. Report to be given at bedside in the holding area.

## 2019-11-21 NOTE — ANESTHESIA PREPROCEDURE EVALUATION
Relevant Problems No relevant active problems Anesthetic History No history of anesthetic complications Review of Systems / Medical History Patient summary reviewed, nursing notes reviewed and pertinent labs reviewed Pulmonary COPD Neuro/Psych  
 
seizures Cardiovascular CHF: NYHA Classification III Pacemaker, past MI and CAD Exercise tolerance: <4 METS 
  
GI/Hepatic/Renal 
Within defined limits Hepatitis: type C Renal disease: CRI Liver disease Endo/Other Within defined limits Diabetes: type 2 Arthritis Other Findings Physical Exam 
 
Airway Mallampati: II 
TM Distance: > 6 cm Neck ROM: normal range of motion Mouth opening: Normal 
 
 Cardiovascular Regular rate and rhythm,  S1 and S2 normal,  no murmur, click, rub, or gallop Dental 
 
Dentition: Full lower dentures and Full upper dentures Pulmonary Breath sounds clear to auscultation Abdominal 
GI exam deferred Other Findings Anesthetic Plan ASA: 4 Anesthesia type: general 
 
Monitoring Plan: Arterial line Induction: Intravenous Anesthetic plan and risks discussed with: Patient

## 2019-11-21 NOTE — ANESTHESIA PROCEDURE NOTES
Arterial Line Placement Start time: 11/21/2019 9:00 AM 
End time: 11/21/2019 9:03 AM 
Performed by: Emma Steward CRNA Authorized by: Bertha Forman MD  
 
Pre-Procedure Preanesthetic Checklist: patient identified, risks and benefits discussed, anesthesia consent, site marked, patient being monitored, timeout performed and patient being monitored Timeout Time: 08:58 Procedure:  
Prep:  Chlorhexidine Seldinger Technique?: Yes Orientation:  Left Location:  Radial artery Number of attempts:  1 Assessment:  
Post-procedure:  Line secured and sterile dressing applied Patient Tolerance:  Patient tolerated the procedure well with no immediate complications

## 2019-11-21 NOTE — ANESTHESIA PROCEDURE NOTES
Arterial Line Placement Start time: 11/21/2019 9:05 AM 
End time: 11/21/2019 9:10 AM 
Performed by: Miguel Fleming MD 
Authorized by: Miguel Fleming MD  
 
Pre-Procedure Indications:  Arterial pressure monitoring and blood sampling Preanesthetic Checklist: patient identified, risks and benefits discussed, anesthesia consent, site marked, patient being monitored, timeout performed and patient being monitored Timeout Time: 09:05 Procedure:  
Prep:  ChloraPrep and alcohol Seldinger Technique?: Yes Orientation:  Left Location:  Radial artery Catheter size:  20 G Number of attempts:  1 Cont Cardiac Output Sensor: No   
 
Assessment:  
Post-procedure:  Line secured and sterile dressing applied Patient Tolerance:  Patient tolerated the procedure well with no immediate complications

## 2019-11-21 NOTE — PROGRESS NOTES
TRANSFER - IN REPORT:    Verbal report received from Edna on [address].  being received from procedure room for routine progression of care. Report consisted of patients Situation, Background, Assessment and Recommendations(SBAR). Information from the following report(s) SBAR was reviewed with the receiving clinician. Opportunity for questions and clarification was provided. Assessment completed upon patients arrival to [address] [place] [address] and care assumed. Cardiac Cath Lab Recovery Arrival Note:    [address] arrived to [facility] recovery area. Patient procedure= Case cxcld--pt. Has clot. Patient on cardiac monitor, non-invasive blood pressure, SPO2 monitor. On room air. IV  of NS on pump at [facility] ml/hr. Patient status doing well without problems. Patient is A&Ox 3. Patient reports no CP. No change in patient status. Continue to monitor patient and status.

## 2019-11-21 NOTE — Clinical Note
prepped with ChloraPrep and draped. Wet prep solution applied at: 906. Wet prep solution dried at: 912. Wet prep elapsed drying time: 6 mins.

## 2019-11-21 NOTE — PROCEDURES
Procedure aborted due to finding of thrombus along coumadin ridge. Plan for trial of eliquis; repeat LUÍS in 3 weeks and repeat attempt for WATCHMAN in future.        Юлия Willis MD

## 2019-11-22 NOTE — PROGRESS NOTES
Hospital Discharge Follow-Up Date/Time:  2019 10:20 AM 
 
Patient was admitted to [facility] on 19 and discharged on 19 for scheduled Watchman procedure. The physician discharge summary was not available at the time of outreach. Patient was contacted within one business days of discharge. Top Challenges reviewed with the provider PCP- asked for his input about notes about him being nonadherent. Advance Care Planning:  
Does patient have an Advance Directive:  reviewed and needs to be updated - AMD dated 3/12/18- states that he wants all life-saving measures except not to open chest cavity- [facility] on file dated 17. Sent correction message to team to clarify-correct. Cardiology- Advance HF clinic NP- asked to clarify directions for taking torsemide. Method of communication with provider :staff message Inpatient RRAT score: N/A  Outpatient procedure Was this a readmission? no  
 
Care Transition Nurse (CTN) contacted the patient by telephone to perform post hospital discharge assessment. Verified name and  with patient as identifiers. Provided introduction to self, and explanation of the CTN role. Patient did not receive hospital discharge instructions- he received AVS but there were no specific post procedure instructions related to site care, next steps, etc.  CTN reviewed discharge instructions and red flags with patient who verbalized understanding. Patient given an opportunity to ask questions and does not have any further questions or concerns at this time. The patient agrees to contact the PCP office for questions related to their healthcare. CTN provided contact information for future reference. Disease Specific:   CHF Patients top risk factors for readmission:  financial, lack of knowledge about disease, level of motivation, medical condition, medication management Home Health orders at discharge: none Durable Medical Equipment ordered at discharge: none Medication(s):  
New Medications at Discharge: Eliquis 5 mg BID Changed Medications at Discharge: None Discontinued Medications at Discharge: None Medication reconciliation was performed with patient, who verbalizes understanding of administration of home medications. There were no barriers to obtaining medications identified at this time. Refer to goals section about cost of medications. Also reached out to NP in advance HF clinic to clarify the directions for his torsemide 20 mg tablets. Referral to Pharm D needed: no  
 
Current Outpatient Medications Medication Sig  
 apixaban (ELIQUIS) 5 mg tablet Take 1 Tab by mouth two (2) times a day.  torsemide (DEMADEX) 20 mg tablet Take 0.5 Tabs by mouth as needed (for shortness of breath or edema). FYI dose decrease by JING Fisher NP on 9/18/19  potassium chloride (K-DUR, KLOR-CON) 20 mEq tablet TAKE 1 TABLET BY MOUTH TWICE DAILY  metoprolol succinate (TOPROL-XL) 50 mg XL tablet Take 1 Tab by mouth daily.  clopidogrel (PLAVIX) 75 mg tab Take  by mouth.  cholecalciferol (VITAMIN D3) 5,000 unit capsule Take 1 Cap by mouth daily.  sacubitril-valsartan (ENTRESTO) 97 mg/103 mg tablet Take 1 Tab by mouth two (2) times a day.  umeclidinium-vilanterol (ANORO ELLIPTA) 62.5-25 mcg/actuation inhaler Take 1 Puff by inhalation daily.  atorvastatin (LIPITOR) 40 mg tablet TAKE ONE TABLET BY MOUTH ONCE DAILY  Indications: excessive fat in the blood  insulin NPH/insulin regular (NOVOLIN 70/30 U-100 INSULIN) 100 unit/mL (70-30) injection INJECT 10 UNITS SUBCUTANEOUSLY IN THE MORNING AND 8 UNIT IN THE EVENING (Patient taking differently: No sig reported)  aspirin 81 mg chewable tablet Take 81 mg by mouth daily.  Insulin Syringes, Disposable, 1 mL syrg Pt to take 10 units w/ breakfast and 8 units w/ dinner No current facility-administered medications for this visit. There are no discontinued medications. [facility] follow up appointment(s):  
Future Appointments Date Time Provider Shelley Gaonai 11/26/2019  2:10 PM Amber Richey MD [facility] SCHED  
11/29/2019 10:00 AM VASCULAR, [facility] SCHED  
12/3/2019 11:00 AM Helena Gabriel NP [facility] SCHED  
12/5/2019 11:30 AM REMOTE1, LEACH [facility] [place] SCHED  
12/11/2019 11:00 AM Eva Batista  [address]  
5/11/2020 11:45 AM PACEMAKER, STFRANCES [facility] SCHED  
8/19/2020  9:00 AM Maribel Hebert MD [address] [place] [facility] follow up appointment(s): None at this time [facility]:  n/a  
 
 
Goals Chronic Disease  Identification of barriers to adherence to a plan of care such as inability to afford medications, lack of insurance, lack of transportation, etc.   
  11/22/19-   Review of EMR - some notes about adherence with medications and about affording medications- reached out to MSW in advance HF clinic to see if she has insight to share and/or if she can assist.   
He shared with me today- the 30 days supply of Eliquis cost him over $120- relayed this to EP provider's nurse- [facility] if he needs to stay on Eliquis past the initial 30 days - office will need to help out with samples until after January 1st- relayed this information to the vendor to talk with cardiac cath lab-EP Lab at [facility] to make sure they are aware. Reached out to advance HF clinic- they are helping him with renewal for PA for Entresto. He does have Part D. She relays that his income is above guidelines for medicaid assistance- Medication Assistance with medicare. Sent referral ask our MSW to help with screening and look for PA options for his other medications. Reached out to PHYSICIAN'S [facility] [employer] department about notes about previous FA application. Will determine next steps if available. [facility] Post Hospitalization  Attends follow-up appointments as directed. 11/22/19-   
PCP- Dr. Kristina Cuevas  Tuesday, 11/26 at 2:10. Cardiology- Primary- Dr. Ady Samayoa- has not seen since 9/27/18- will clarify who managing for                                      primary  - per Dr. Ady Samayoa- he will be. Will coordinate f/u appt with him. EP- Dr. Alyssa Bentley- office will call to schedule LUÍS procedure in 3 weeks. Device- dual lead PPM-ICD, remote due on 12/5 at 11:30. Advance HF clinic - Dr. Karen Quintana PAD- Dr. Salvadore Lennox- will get vascular test done on 11/29- at 10 am, see Salvadore Lennox on                                  12/11 at 11 am - [place] office location. [facility]  Knowledge and adherence of prescribed medication (ie. action, side effects, missed dose, etc.).   
  11/22/19- Mr. Sridhar Feng states that his 30 days supply of Eliquis costs over $120- refer to other goals about plan. Also noted that there is documentation about medication adherence- may be due to cost of medications- he does have Part D. Will assess further and reached out to MSW for assistance. [facility]  
  
  Understands red flags post discharge. 11/22/19- spoke with Mr. Sridhar Feng- he had fair understanding and now better understanding of plan regarding yesterday's cancelled Watchman procedure- explained mechanism of action for anti-coagulants and anti-platelet- he is to take both until next LUÍS can be done- approx 3 weeks.     LL[facility]

## 2019-11-26 PROBLEM — N18.30 CKD (CHRONIC KIDNEY DISEASE) STAGE 3, GFR 30-59 ML/MIN (HCC): Status: ACTIVE | Noted: 2019-01-01

## 2019-11-26 NOTE — PROGRESS NOTES
Kelvin Moss is a 81 y.o. male , id x 2(name and ). Reviewed record, history, and  medications. Chief Complaint Patient presents with  Diabetes 6 month f/u Vitals:  
 19 1421 BP: 126/74 Pulse: 70 Resp: 18 Temp: 98.2 °F (36.8 °C) TempSrc: Oral  
SpO2: 95% Weight: 204 lb (92.5 kg) Height: 5' 10\" (1.778 m) PainSc:   0 - No pain Coordination of Care Questionnaire:  
1) Have you been to an emergency room, urgent care, or hospitalized since your last visit? Yes, [facility] 10/22/19 endoscopy procedure. 2. Have seen or consulted any other health care provider since your last visit? NO 
 
3) Do you have an Advanced Directive/ Living Will in place? Yes If yes, do we have a copy on file YES 
 
 
3 most recent PHQ Screens 2019 PHQ Not Done - Little interest or pleasure in doing things Not at all Feeling down, depressed, irritable, or hopeless Not at all Total Score PHQ 2 0 Trouble falling or staying asleep, or sleeping too much - Feeling tired or having little energy - Poor appetite, weight loss, or overeating - Feeling bad about yourself - or that you are a failure or have let yourself or your family down - Trouble concentrating on things such as school, work, reading, or watching TV - Moving or speaking so slowly that other people could have noticed; or the opposite being so fidgety that others notice - Thoughts of being better off dead, or hurting yourself in some way -  
PHQ 9 Score - How difficult have these problems made it for you to do your work, take care of your home and get along with others - Patient is accompanied by self I have received verbal consent from Kelvin Moss to discuss any/all medical information while they are present in the room. Chief Complaint Patient presents with  Diabetes 6 month f/u He is a 81 y.o. male who presents for evalution. Reviewed PmHx, RxHx, FmHx, SocHx, AllgHx and updated and dated in the chart. Patient Active Problem List  
 Diagnosis  Presence of Watchman left atrial appendage closure device  CKD (chronic kidney disease) stage 3, GFR 30-59 ml/min ([facility])  Vitamin deficiency  Chronic systolic congestive heart failure, NYHA class 3 ([facility]  Type 2 diabetes mellitus with diabetic neuropathy ([facility])  Neuropathy  Claudication ([facility]  Tobacco dependence syndrome  Noncompliance with medication regimen  Proteinuria  Vitamin D deficiency  Polyp of colon  Advance care planning  Type 2 diabetes mellitus with nephropathy ([facility]  Hypokalemia  ICD (implantable cardioverter-defibrillator) in place  Peripheral vascular disease ([facility] A. RICKY (3/20/17): Right 0.58, Left 0.56.  Coronary artery disease involving native heart without angina pectoris  Systolic heart failure, ACC/AHA stage C ([facility]  Type 2 diabetes mellitus with complication ([facility]  Paroxysmal atrial fibrillation (HCC)  H/O: GI bleed  Hepatitis C  
 Chronic renal insufficiency  Dyslipidemia A. FLP (1/19/17): Tot 120, , HDL 19, LDL 76 (no Rx).  Ischemic cardiomyopathy A.  Echo (1/19/17):  EF 5-10% with severe GHK,. Mildly dil LA. Mild TR. PASP 46. B. Cath (1/20/17):  LM ost70. LAD m50. D1 80. LCx d70; OM1 99, OM2 90. RCA p100. No AVG. PAP  53/27/36. C.  PCI (2/9/17): pRCA 2.5x38 Xience + 2.75x12 Xience. ostLM 4.0x12 Xience post-dil with 4.5x12 NC. D.  Echo (2/13/17):  EF 10% with severe GHK, PSM. Dil LA. Mod MR. Mild to mod TR. Left pleural effusion. E.  Echo (5/15/17): EF 25% with severe GHK and basl-mid inf AK, mildly dil LV, DD. Sev dil LA. Mod MR. Mild TR. PASP 35. F.  ICD (6/12/17, Anup): [employer].  NAVARRO (dyspnea on exertion) Review of Systems - negative except as listed above in the HPI Objective:  
 
Vitals:  
 11/26/19 1421 BP: 126/74 Pulse: 70 Resp: 18 Temp: 98.2 °F (36.8 °C) TempSrc: Oral  
SpO2: 95% Weight: 204 lb (92.5 kg) Height: 5' 10\" (1.778 m) Physical Examination: General appearance - alert, well appearing, and in no distress Chest - clear to auscultation, no wheezes, rales or rhonchi, symmetric air entry Heart - normal rate, regular rhythm, normal S1, S2, no murmurs, rubs, clicks or gallops Assessment/ Plan:  
Diagnoses and all orders for this visit: 
 
1. Type 2 diabetes mellitus with complication (HCC) 
-     insulin glargine (LANTUS,BASAGLAR) 100 unit/mL (3 mL) inpn; 30 units every morning 
-     pen needle, diabetic (NOVOFINE PLUS) 32 gauge x 1/6\" ndle; 1 Each by Does Not Apply route daily. 
-dc daily insulin and add new rx to get better control Lab Results Component Value Date/Time Hemoglobin A1c 9.9 (H) 11/14/2019 03:48 PM  
 Hemoglobin A1c (POC) 6.4 08/17/2017 11:00 AM  
 
-cannot take metformin or actos and would avoid renal rx for now due to medical issues -dwp diet 2. CKD (chronic kidney disease) stage 3, GFR 30-59 ml/min ([facility]) 
--see labs 3. Systolic heart failure, ACC/AHA stage C ([facility] -seeing cardio and stable Follow-up and Dispositions · Return in about 3 months (around 2/26/2020). I have discussed the diagnosis with the patient and the intended plan as seen in the above orders. The patient understands and agrees with the plan. The patient has received an after-visit summary and questions were answered concerning future plans. Medication Side Effects and Warnings were discussed with patient Patient Labs were reviewed and or requested: 
Patient Past Records were reviewed and or requested Cathy Lopez M.D. There are no Patient Instructions on file for this visit.

## 2019-12-02 NOTE — PROGRESS NOTES
Zoie Momin. is a 81 y.o. male Chief Complaint Patient presents with  
[facility] Follow Up  
  11/21/19 for arron  Other  
  ICD, HF  
 Cardiomyopathy  Irregular Heart Beat PAF Visit Vitals /76 (BP 1 Location: Left arm, BP Patient Position: Sitting) Pulse 68 Resp 18 Ht 5' 10\" (1.778 m) Wt 202 lb (91.6 kg) SpO2 96% BMI 28.98 kg/m² 1. Have you been to the ER, urgent care clinic since your last visit? Hospitalized since your last visit? YES, arron 11/21/19. 2. Have you seen or consulted any other health care providers outside of the [address] [place] [address] since your last visit? Include any pap smears or colon screening.  No

## 2019-12-02 NOTE — PROGRESS NOTES
Jason Calloway MD. [facility] Patient: Zeeshan Arambula. : 1950 Today's Date: 2019 HISTORY OF PRESENT ILLNESS:  
 
History of Present Illness: 
Here for follow-up. Stable cardiac wise. No CP or sig SOB. Feels well overall. No bleeding. Has chronic claudication complaints. PAST MEDICAL HISTORY:  
 
Past Medical History:  
Diagnosis Date  Arthritis   
 hands/fingers-OSTEO  
 CAD (coronary artery disease)   
 involving native coronary artery of native heart without angina pectoris  Cardiomyopathy ([address] 2017 A. Echo (17):  EF 5-10% with severe GHK,. Mildly dil LA. Mild TR. PASP 46./systemic cardiomyopathy  Chronic kidney disease  Chronic obstructive pulmonary disease ([address]  Chronic renal insufficiency 2017  Chronic systolic congestive heart failure, NYHA class 3 ([address] 2019  Claudication ([address] 10/9/2018  Congestive heart failure ([address]  Diabetes mellitus ([address] 3/6/2017 IDDM  Dyslipidemia 3/6/2017 A. FLP (17): Tot 120, , HDL 19, LDL 76 (no Rx).  H/O: GI bleed 3/6/2017  Hepatitis C 3/6/2017  Hypokalemia  ICD (implantable cardioverter-defibrillator) in place  Ill-defined condition   
 flu 2018  Neuropathy 2018  Noncompliance with medication regimen 2018  Paroxysmal atrial fibrillation ([address] 3/6/2017  Peripheral vascular disease ([address] 2017 A. RICKY (3/20/17): Right 0.58, Left 0.56.  Seizures ([address] WITH HEPARIN  
 Tobacco dependence syndrome 10/9/2018  Vitamin D deficiency 2018 Past Surgical History:  
Procedure Laterality Date  CARDIAC SURG PROCEDURE UNLIST CORONARY STENTX 3 2 CORONARY, & 1 FEMORAL  
 COLONOSCOPY N/A 2017 COLONOSCOPY performed by Peterson Xavier. Angela Mcbride MD at [address] [place] [address] ENDOSCOPY  COLONOSCOPY N/A 2018 COLONOSCOPY performed by Peterson Xavier. Angela Mcbride MD at [address] [place] [address] ENDOSCOPY  COLONOSCOPY N/A 10/22/2019 COLONOSCOPY performed by Kadie Steiner MD at [facility] ENDOSCOPY  
 HX CORONARY STENT PLACEMENT    
 LM, RCA x 2   
 HX ENDOSCOPY  10/22/2019 [facility]   
 HX GI    
 colonoscopy x 3 - last 2/2017 - polyp  HX IMPLANTABLE CARDIOVERTER DEFIBRILLATOR  HX OTHER SURGICAL    
 HX PACEMAKER    
 AICD  VASCULAR SURGERY PROCEDURE UNLIST  2017 STENT - RIGHT FEMORAL   
 
 
 
 
MEDICATIONS:  
 
Current Outpatient Medications Medication Sig Dispense Refill  torsemide (DEMADEX) 10 mg tablet Take 1 Tab by mouth daily. 30 Tab 0  
 insulin glargine (LANTUS,BASAGLAR) 100 unit/mL (3 mL) inpn 30 units every morning 5 Pen 1  
 pen needle, diabetic (NOVOFINE PLUS) 32 gauge x 1/6\" ndle 1 Each by Does Not Apply route daily. 100 Pen Needle 1  
 apixaban (ELIQUIS) 5 mg tablet Take 1 Tab by mouth two (2) times a day. 60 Tab 2  
 torsemide (DEMADEX) 20 mg tablet Take 0.5 Tabs by mouth as needed (for shortness of breath or edema). FYI dose decrease by JING De Anda NP on 9/18/19 (Patient taking differently: Take 10 mg by mouth daily. FYI dose decrease by JING De Anda NP on 9/18/19) 180 Tab 1  potassium chloride (K-DUR, KLOR-CON) 20 mEq tablet TAKE 1 TABLET BY MOUTH TWICE DAILY 60 Tab 3  
 metoprolol succinate (TOPROL-XL) 50 mg XL tablet Take 1 Tab by mouth daily. 90 Tab 3  clopidogrel (PLAVIX) 75 mg tab Take  by mouth.  cholecalciferol (VITAMIN D3) 5,000 unit capsule Take 1 Cap by mouth daily. 30 Cap 2  
 sacubitril-valsartan (ENTRESTO) 97 mg/103 mg tablet Take 1 Tab by mouth two (2) times a day. 180 Tab 3  
 umeclidinium-vilanterol (ANORO ELLIPTA) 62.5-25 mcg/actuation inhaler Take 1 Puff by inhalation daily. 1 Inhaler 5  
 atorvastatin (LIPITOR) 40 mg tablet TAKE ONE TABLET BY MOUTH ONCE DAILY  Indications: excessive fat in the blood 90 Tab 3  
 aspirin 81 mg chewable tablet Take 81 mg by mouth daily.  Insulin Syringes, Disposable, 1 mL syrg Pt to take 10 units w/ breakfast and 8 units w/ dinner 500 Syringe 1 Allergies Allergen Reactions  Heparin (Porcine) Other (comments) Was told when in the hospital that if he received heparin again that it would be deadly. SOCIAL HISTORY:  
 
Social History Tobacco Use  Smoking status: Current Every Day Smoker Packs/day: 1.00 Years: 55.00 Pack years: 55.00  Smokeless tobacco: Never Used  Tobacco comment:    
Substance Use Topics  Alcohol use: Yes Comment: 1 BEER WEEKLY  Drug use: Not Currently FAMILY HISTORY:  
 
Family History Problem Relation Age of Onset  Hypertension Mother  Heart Disease Mother MURMUR  
 Cancer Father COLON  
 Colon Cancer Father  Other Sister   
     born totally handicapped/epileptic  Kidney Disease Sister   
      from renal shutdown  Heart Disease Brother  Other Brother ?pancreatic cancer or ?pancreatitis  Cancer Brother PANCREATIC? AND COLON  
 Cancer Maternal Uncle   
     toes and spread  Cancer Paternal Uncle   
     stomach  
 Heart Attack Maternal Grandfather 47  Cancer Paternal Grandfather   
     bone  Cancer Maternal Uncle   
     stomach  Cancer Maternal Uncle   
     lung  No Known Problems Son  No Known Problems Son  Anesth Problems Neg Hx   
 
 
 
  
REVIEW OF SYMPTOMS:  
  
Review of Symptoms: 
Constitutional: Negative for fever HEENT: Negative for vision changes. + tinnitus Respiratory: + chronic cough Cardiovascular: Negative for syncope Gastrointestinal: Negative for abdominal pain, melena Genitourinary: Negative for dysuria Skin: Negative for rash Heme: No problems bleeding. Neuro - no speech changes or focal weaknesses + claudication  
  
  
  
PHYSICAL EXAM:  
  
Physical Exam: 
Vitals pending Patient appears generally well, mood and affect are appropriate and pleasant. HEENT:  Hearing intact, non-icteric, normocephalic, atraumatic. Neck Exam: Supple, No JVD Lung Exam: Clear to auscultation, even breath sounds. Cardiac Exam: Regular rate and rhythm with no murmur Abdomen: Soft, non-tender. Obese  
Extremities: Moves all ext well. No lower extremity edema. Psych: Appropriate affect Neuro - Grossly intact 
  
  
  
LABS / OTHER STUDIES:  
  
Lab Results Component Value Date/Time Sodium 136 11/14/2019 03:48 PM  
 Potassium 4.6 11/14/2019 03:48 PM  
 Chloride 105 11/14/2019 03:48 PM  
 CO2 26 11/14/2019 03:48 PM  
 Anion gap 5 11/14/2019 03:48 PM  
 Glucose 315 (H) 11/14/2019 03:48 PM  
 BUN 29 (H) 11/14/2019 03:48 PM  
 Creatinine 2.09 (H) 11/14/2019 03:48 PM  
 BUN/Creatinine ratio 14 11/14/2019 03:48 PM  
 GFR est AA 39 (L) 11/14/2019 03:48 PM  
 GFR est non-AA 32 (L) 11/14/2019 03:48 PM  
 Calcium 8.7 11/14/2019 03:48 PM  
 Bilirubin, total 0.5 11/14/2019 03:48 PM  
 AST (SGOT) 11 (L) 11/14/2019 03:48 PM  
 Alk. phosphatase 68 11/14/2019 03:48 PM  
 Protein, total 7.0 11/14/2019 03:48 PM  
 Albumin 3.7 11/14/2019 03:48 PM  
 Globulin 3.3 11/14/2019 03:48 PM  
 A-G Ratio 1.1 11/14/2019 03:48 PM  
 ALT (SGPT) 22 11/14/2019 03:48 PM  
 
 
Lab Results Component Value Date/Time WBC 8.6 11/14/2019 03:48 PM  
 HGB 13.6 11/14/2019 03:48 PM  
 HCT 41.4 11/14/2019 03:48 PM  
 PLATELET 609 (L) 76/98/6536 03:48 PM  
 MCV 90.2 11/14/2019 03:48 PM  
 
 
Lab Results Component Value Date/Time Cholesterol, total 124 05/22/2019 10:16 AM  
 HDL Cholesterol 38 (L) 05/22/2019 10:16 AM  
 LDL, calculated 60 05/22/2019 10:16 AM  
 VLDL, calculated 26 05/22/2019 10:16 AM  
 Triglyceride 128 05/22/2019 10:16 AM  
 CHOL/HDL Ratio 6.3 (H) 01/19/2017 03:50 AM  
 
 
Lab Results Component Value Date/Time  TSH 5.260 (H) 09/06/2019 01:51 PM  
 
 
  
CARDIAC DIAGNOSTICS:  
  
Cardiac Evaluation Includes: 
  
 Mely Levine (1/19/17):  EF 5-10% with severe GHK,.  Mildly dil LA.  Mild TR.  PASP 46. Hans Samuels (1/20/17):  LM ost70.  LAD m50.  D1 80.  LCx d70; OM1 99, OM2 90.  RCA p100.  No AVG.  PAP  53/27/36. C.  PCI (2/9/17): pRCA 2.5x38 Xience + 2.75x12 Xience.  ostLM 4.0x12 Xience post-dil with 4.5x12 NC. Valerie Umanzor (2/13/17):  EF 10% with severe GHK, PSM.  Dil LA.  Mod MR.  Mild to mod TR.  Left pleural effusion. Sumaya Bean (5/15/17): EF 25% with severe GHK and basl-mid inf AK, mildly dil LV, DD.  Sev dil LA.  Mod MR.  Mild TR.  PASP 35. Rene Byrne (6/12/17, Anup): Kristina Patino. 
  
Echo 7/11/18 - LV mildly dilated.  LVEF 25%.  Akinesis of the basal-mid inferior wall(s). Grade 1 diastolic dysfunction.  Mild-mod LAE.  Mild MR.  PASP 42.   Sinus of valsalva was 4.3 cm, STJ was 3.4 and ascending aorta was 3.6 cm.  
  
Echo 7/11/19 - LVEF 21-25%, PASP 45 mmHg LUÍS 9/27/19 - LVEF 20-25%, NAGE measurements taken RICKY 11/29/19 - Right RICKY 0.39; Left RICKY 0.60 
 
  
  
ASSESSMENT AND PLAN:  
  
Assessment and Plan: 
1) Chronic systolic heart failure (EF 25%), d/t ICM, NYHA class II-III 
- Continue the Entresto 97/103 mg BID, Toprol XL   
- Hold on aldactone for now hx hyperkalemia while on GDMT, and he does not want to take valtessa - Volume compensated -- continue Torsemide 10 mg daily 2) HTN  
- BP OK 
- cont meds 3) PAD 
- prior angio showed significant PAD,not amenable to PTCA and the recommendations were for vascular surgery. - He sees Dr. Fabricio Kinsey soon 4) CAD s/p PCI  
- given smoking, high risk of recurrent events  
- cont statin, BB, ASA (along with Eliquis for now) 
- stopping plavix since on ASA and Eliquis, but may resume plavix later when he is off of [address]  
 
5) PAF 
- Continue Toprol XL  
- Watchman device cancelled 11/21/19 --> Eliquis started then with plans for re-attempt in 3 weeks ---> Given GI bleeding risk, would like him off of [facility]  --> have emailed Dr. Lili Keys to see when he can re-attempt the Watchman 6) Recurrent tobacco use - Counseled on smoking cessation   
7) CKD3 
  
8) Suspect KELLY and COPD 
- Previously encouraged him to see pulmonary for eval of COPD and sleep study- he has declined   
9) Diabetes recent A1c 7.1  
- Pt declined Jardiance in past 
  
10) H/O lower GIB / s/p colorectal surgery  
- colonic polyp and AVM   
 
11) Paraesthesias/neuropathy 2/2 DM, PAD  
  
12) History of Med Non-compliance 
- he says he has been compliant with meds lately 13) See me back in 6 months.  Patient expressed understanding of the plan - questions were answered.    
Lives with his wife. Retired ().  [place] [employer] history.   
 
  
Jimy Osorio MD, [address], [address]                [id number] [facility]. [address], [address] [place] [address] Ph: [phone]                                                              [phone]

## 2019-12-06 NOTE — PATIENT INSTRUCTIONS
No medication changes today Testing Ordered: 
Labs ordered today - we will call you with any abnormal results Follow up with Rey Armenta in three months to see NP or MD 
 
 
Please monitor your blood pressures daily prior to medications and 2 hours after taking medications. Bring a written record of your blood pressures to your next appointment. Please monitor your weights daily upon waking and after using the bathroom. Keep a written records of your weights and bring to your next appointment. If you have a weight gain of 3 or more pounds overnight OR 5 or more pounds in one week please contact our office. Thank you for allowing us the privilege of being a part of your healthcare team! Please do not hesitate to contact our office at [phone] with any questions or concerns. Low Sodium Diet (2,000 Milligram): Care Instructions Your Care Instructions Too much sodium causes your body to hold on to extra water. This can raise your blood pressure and force your heart and kidneys to work harder. In very serious cases, this could cause you to be put in the hospital. It might even be life-threatening. By limiting sodium, you will feel better and lower your risk of serious problems. The most common source of sodium is salt. People get most of the salt in their diet from canned, prepared, and packaged foods. Fast food and restaurant meals also are very high in sodium. Your doctor will probably limit your sodium to less than 2,000 milligrams (mg) a day. This limit counts all the sodium in prepared and packaged foods and any salt you add to your food. Follow-up care is a key part of your treatment and safety. Be sure to make and go to all appointments, and call your doctor if you are having problems. It's also a good idea to know your test results and keep a list of the medicines you take. How can you care for yourself at home? Read food labels · Read labels on cans and food packages. The labels tell you how much sodium is in each serving. Make sure that you look at the serving size. If you eat more than the serving size, you have eaten more sodium. · Food labels also tell you the Percent Daily Value for sodium. Choose products with low Percent Daily Values for sodium. · Be aware that sodium can come in forms other than salt, including monosodium glutamate (MSG), sodium citrate, and sodium bicarbonate (baking soda). MSG is often added to Asian food. When you eat out, you can sometimes ask for food without MSG or added salt. Buy low-sodium foods · Buy foods that are labeled \"unsalted\" (no salt added), \"sodium-free\" (less than 5 mg of sodium per serving), or \"low-sodium\" (less than 140 mg of sodium per serving). Foods labeled \"reduced-sodium\" and \"light sodium\" may still have too much sodium. Be sure to read the label to see how much sodium you are getting. · Buy fresh vegetables, or frozen vegetables without added sauces. Buy low-sodium versions of canned vegetables, soups, and other canned goods. Prepare low-sodium meals · Cut back on the amount of salt you use in cooking. This will help you adjust to the taste. Do not add salt after cooking. One teaspoon of salt has about 2,300 mg of sodium. · Take the salt shaker off the table. · Flavor your food with garlic, lemon juice, onion, vinegar, herbs, and spices. Do not use soy sauce, lite soy sauce, steak sauce, onion salt, garlic salt, celery salt, mustard, or ketchup on your food. · Use low-sodium salad dressings, sauces, and ketchup. Or make your own salad dressings and sauces without adding salt. · Use less salt (or none) when recipes call for it. You can often use half the salt a recipe calls for without losing flavor. Other foods such as rice, pasta, and grains do not need added salt. · Rinse canned vegetables, and cook them in fresh water. This removes somebut not allof the salt. · Avoid water that is naturally high in sodium or that has been treated with water softeners, which add sodium. Call your local water company to find out the sodium content of your water supply. If you buy bottled water, read the label and choose a sodium-free brand. Avoid high-sodium foods · Avoid eating: 
? Smoked, cured, salted, and canned meat, fish, and poultry. ? Ham, stone, hot dogs, and luncheon meats. ? Regular, hard, and processed cheese and regular peanut butter. ? Crackers with salted tops, and other salted snack foods such as pretzels, chips, and salted popcorn. ? Frozen prepared meals, unless labeled low-sodium. ? Canned and dried soups, broths, and bouillon, unless labeled sodium-free or low-sodium. ? Canned vegetables, unless labeled sodium-free or low-sodium. ? Western [place]ha fries, pizza, tacos, and other fast foods. ? Pickles, olives, ketchup, and other condiments, especially soy sauce, unless labeled sodium-free or low-sodium. Where can you learn more? Go to http://erik-dianna.info/. Enter J467 in the search box to learn more about \"Low Sodium Diet (2,000 Milligram): Care Instructions. \" Current as of: November 7, 2018 Content Version: 12.2 © 1633-4147 [employer]. Care instructions adapted under license by [employer] (which disclaims liability or warranty for this information). If you have questions about a medical condition or this instruction, always ask your healthcare professional. [place] [address] any warranty or liability for your use of this information.

## 2019-12-06 NOTE — PROGRESS NOTES
[address] [facility] [place] [address] [facility] [place] in [place], [place] Heart Failure Clinic Note Patient name: Ezra Brown Patient : 1950 Patient MRN: [id number] Date of service: 19 Primary care physician: Reynaldo Aguiar MD 
Primary cardiologist: Dr. Cherry Cavazos CHIEF COMPLAINT: 
Chief Complaint Patient presents with  CHF PLAN: 
Continue current medical therapy for heart failure Continue current dose of toprol XL 50mg daily Continue Entresto 97/103 mg twice daily Cannot tolerate aldactone due to hyperkalemia; not interested in taking veltassa Torsemide 10mg prn for SOB/edema/weight gain Encourage albuterol inhaler use prn for SOB/wheezing Continue ASA, plavix and statin for CAD Stopped amiodarone due to Hyperthyroidism, check thyroid profile every 3 months and PFT/DLCO every year ICD interrogation last check 18, follow-up with Dr. Shukri Bridges ECHO  19- LVEF 21-25%, LVGH, mild MR, mild TR, mild pulm HTN, est PA 45mmHg, LVIDd 5.32cm, Tapse 2.38cm Watchman aborted due to hrombus along coumadin ridge, continue eliquis 5mg BID, plans to be re-evaluated by Dr. Shukri Bridges in 3 months Labs today: BMP, pro-NT-BNP, mag, Vitamin D Reinforced low salt diet, not adhering to low Na+/CHO diet Reinforced fluid restriction to 6 x 8oz glasses per day Discussed tobacco cessation, currently smoking 1ppd Advanced care plan present on the chart Counseled to obtain home scale and arm blood pressure cuff Document in diary BP/HR before and 2 hours after taking medications and daily weights Received flu vaccination Recommend  pneumonia vaccine every 5 years Follow up with Endocrinologist, Dr. Lieutenant Ness, for hyperthyroidism Patient declines referral with vascular surgery for PAD Follow-up with primary cardiologist, Dr. Cherry Cavazos Follow-up with EP cardiologist, Dr. Shukri Bridges for follow up potential watchmen procedure Follow-up with PCP, Dr. Oscar Wu, for diabetes management; patient interested in "[employer] Patient declined referral to pulmonary for COPD and sleep study Return to [facility] in 2-3 months with NP/MD 
 
IMPRESSION: 
Chronic systolic heart failure Stage C, NYHA class II symptoms Coronary artery disease S/p impella assisted high risk PCI with MIKE to 100% p-RCA and 70% LM 2/9/17 by Dr. Dara Soriano Ischemic cardiomyopathy, LVEF 25% HTN 
PAD not amenable to PTCA Incomplete LBBB 
CKD3 KELLY COPD S/p GI bleed and colorectal surgery Medical noncompliance Claudication PAD Hepatitis C 
 
CARDIAC IMAGING: 
Echo (7/11/18) LVEF 25%, akinesis of basal-mid inferior wall. PA pressure 42mmHg. ECHO (7/11/19) 21-25%, LVGH, mild MR, mild TR, mild pulm HTN, est PA 45mmHg, LVIDd 5.32cm, Tapse 2.38cm ICD interrogation (12/6/18) no events, normal device function, good batteries HEMODYNAMICS: 
RHC not done CPEST not done 6MW not done HISTORY OF PRESENT ILLNESS: 
I had the pleasure of seeing Shireen Melgar in [address] [place] [address] at [facility] in [place]. Briefly, Shireen Melgar is a 81 y.o. male with h/o CAD s/p PCI of LM,  of RCA, ischemic cardiomyopathy with LVEF 74%, s/p AICD complicated by  PAF (amiodarone), Hep C, GI bleed (s/p colorectal surgery), current tobacco abuse and PAD. INTERVAL HISTORY: 
Today, patient presents for routine clinic visit. Patient is doing better. Continues to smoke >1ppd of cigarettes, attributes this to increased life stressors. Denies symptoms of presyncope, syncope, lightheadedness, dizziness, orthopnea, PND, nocturia, CP, palpitations, or SOB. Patient walked to our clinic from parking lot without having to slow down or stop. Patient can walk more than one block without symptoms of fatigue or shortness of breath.  Patient can perform home activities without a problem and routinely participates in daily walking for more than 15 minutes. He reports NAVARRO with fast walking as the only time he is dyspneic or while doing yard work. Patient denies symptoms of volume overload, although his has significant abdominal fat he attributes to overeating. He reports increased appetite and reports dietary discrepancies attributing his weight gain over the last 3 months. He has gained 7lbs (201lbs)from last clinic visit on 9/6/19,no changes. Patient denies irregular heart rate or palpitations. ICD has not fired. He has a ULÍS scheduled on 9/27/19 with Dr. Mindy Beltre. Patient denies other cardiac symptoms such as chest pain. Positive for claudication. Has been referred tp Dr. Jenny Jeffers for PAD/PVD. Patient is not compliant with fluid restriction or always taking medications as prescribed. Discussed importance of medication compliance and smoking cessation. He reports not taking home BPs, he did not not bring his home BP cuff with him today. REVIEW OF SYSTEMS: 
General: Denies fever, night sweats. Ear, nose and throat: Denies difficulty hearing, sinus problems, runny nose, post-nasal drip, ringing in ears, mouth sores, loose teeth, ear pain, nosebleeds, sore throate, facial pain or numbess Cardiovascular: see above in the interval history Respiratory: +non productive cough, Denies hemoptysis. Gastrointestinal: Denies heartburn, constipation, intolerance to certain foods, diarrhea, abdominal pain, nausea, vomiting, difficulty swallowing, blood in stool Kidney and bladder: Denies painful urination, frequent urination, urgency, prostate problems and impotence Musculoskeletal: Denies joint pain, muscle weakness, +claudication Skin and hair: Denies change in existing skin lesions, hair loss or increase, breast changes PHYSICAL EXAM: 
Vital signs:  
Visit Vitals /62 (BP 1 Location: Right arm, BP Patient Position: Sitting) Temp 97.5 °F (36.4 °C) (Oral) Resp 20 Ht 5' 10\" (1.778 m) Wt 201 lb 3.2 oz (91.3 kg) BMI 28.87 kg/m² General: Patient is well developed, well-nourished in no acute distress HEENT: Normocephalic and atraumatic. No scleral icterus. Pupils are equal, round and reactive to light and accomodation. No conjunctival injection. Oropharynx is clear. Neck: Supple. No evidence of thyroid enlargements or lymphadenopathy. JVD: Cannot be appreciated Lungs: clear bilateral lobes, no rhonchi or rales. Heart: Regular rate and rhythm with normal S1 and S2. No murmurs, gallops or rubs. ICD in RU chest 
Abdomen: Soft, obese, rotund, no mass or tenderness. No organomegaly or hernia. Bowel sounds present. Genitourinary and rectal: deferred Extremities: No cyanosis, clubbing, or edema. flaky BLE, pink/yasir Neurologic: No focal sensory or motor deficits are noted. Grossly intact. Psychiatric: Awake, alert and oriented x 3. Appropriate mood and affect Skin: Warm, dry and well perfused, No lesions, nodules or rashes are noted. PAST MEDICAL HISTORY: 
Past Medical History:  
Diagnosis Date  Arthritis   
 hands/fingers-OSTEO  
 CAD (coronary artery disease)   
 involving native coronary artery of native heart without angina pectoris  Cardiomyopathy ([address] 01/20/2017 A. Echo (1/19/17):  EF 5-10% with severe GHK,. Mildly dil LA. Mild TR. PASP 46./systemic cardiomyopathy  Chronic kidney disease  Chronic obstructive pulmonary disease ([address]  Chronic renal insufficiency 03/06/2017  Chronic systolic congestive heart failure, NYHA class 3 ([address] 1/8/2019  Claudication ([address] 10/9/2018  Congestive heart failure ([address]  Diabetes mellitus ([address] 3/6/2017 IDDM  Dyslipidemia 3/6/2017 A. FLP (1/19/17): Tot 120, , HDL 19, LDL 76 (no Rx).  H/O: GI bleed 3/6/2017  Hepatitis C 3/6/2017  Hypokalemia  ICD (implantable cardioverter-defibrillator) in place  Ill-defined condition   
 flu 1/2018  Neuropathy 11/19/2018  Noncompliance with medication regimen 2018  Paroxysmal atrial fibrillation ([facility] 3/6/2017  Peripheral vascular disease ([facility] 2017 A. RICKY (3/20/17): Right 0.58, Left 0.56.  Seizures ([facility] WITH HEPARIN  
 Tobacco dependence syndrome 10/9/2018  Vitamin D deficiency 2018 PAST SURGICAL HISTORY: 
Past Surgical History:  
Procedure Laterality Date  CARDIAC SURG PROCEDURE UNLIST CORONARY STENTX 3 2 CORONARY, & 1 FEMORAL  
 COLONOSCOPY N/A 2017 COLONOSCOPY performed by Naeem Armstrong. Salas Tarango MD at [facility] ENDOSCOPY  COLONOSCOPY N/A 2018 COLONOSCOPY performed by Naeem Armstrong. Salas Tarango MD at [facility] ENDOSCOPY  COLONOSCOPY N/A 10/22/2019 COLONOSCOPY performed by Kiesha Ventura MD at [facility] ENDOSCOPY  
 HX CORONARY STENT PLACEMENT    
 LM, RCA x 2   
 HX ENDOSCOPY  10/22/2019 [facility]   
 HX GI    
 colonoscopy x 3 - last 2017 - polyp  HX IMPLANTABLE CARDIOVERTER DEFIBRILLATOR  HX OTHER SURGICAL    
 HX PACEMAKER    
 AICD  VASCULAR SURGERY PROCEDURE UNLIST  2017 STENT - RIGHT FEMORAL   
 
 
FAMILY HISTORY: 
Family History Problem Relation Age of Onset  Hypertension Mother  Heart Disease Mother MURMUR  
 Cancer Father COLON  
 Colon Cancer Father  Other Sister   
     born totally handicapped/epileptic  Kidney Disease Sister   
      from renal shutdown  Heart Disease Brother  Other Brother ?pancreatic cancer or ?pancreatitis  Cancer Brother PANCREATIC? AND COLON  
 Cancer Maternal Uncle   
     toes and spread  Cancer Paternal Uncle   
     stomach  
 Heart Attack Maternal Grandfather 47  Cancer Paternal Grandfather   
     bone  Cancer Maternal Uncle   
     stomach  Cancer Maternal Uncle   
     lung  No Known Problems Son  No Known Problems Son  Anesth Problems Neg Hx SOCIAL HISTORY: 
Social History Socioeconomic History  Marital status:  Spouse name: Not on file  Number of children: Not on file  Years of education: Not on file  Highest education level: Not on file Tobacco Use  Smoking status: Current Every Day Smoker Packs/day: 1.00 Years: 55.00 Pack years: 55.00  Smokeless tobacco: Never Used  Tobacco comment:    
Substance and Sexual Activity  Alcohol use: Yes Comment: 1 BEER WEEKLY  Drug use: Not Currently  Sexual activity: Yes  
  Partners: Female LABORATORY RESULTS: 
  
Labs Latest Ref Rng & Units 11/14/2019 WBC 4.1 - 11.1 K/uL 8.6  
RBC 4.10 - 5.70 M/uL 4.59 Hemoglobin 12.1 - 17.0 g/dL 13.6 Hematocrit 36.6 - 50.3 % 41.4 MCV 80.0 - 99.0 FL 90.2 Platelets 547 - 480 K/uL 126(L) Lymphocytes 12 - 49 % 13 Monocytes 5 - 13 % 9 Eosinophils 0 - 7 % 2 Basophils 0 - 1 % 1 Albumin 3.5 - 5.0 g/dL 3.7 Calcium 8.5 - 10.1 MG/DL 8.7 SGOT 15 - 37 U/L 11(L) Glucose 65 - 100 mg/dL 315(H) BUN 6 - 20 MG/DL 29(H) Creatinine 0.70 - 1.30 MG/DL 2.09(H) Sodium 136 - 145 mmol/L 136 Potassium 3.5 - 5.1 mmol/L 4.6 Some recent data might be hidden Lab Results Component Value Date/Time TSH 5.260 (H) 09/06/2019 01:51 PM  
 TSH 0.129 (L) 07/17/2019 12:00 AM  
 TSH 0.049 (L) 04/10/2019 12:00 AM  
 TSH 1.050 12/06/2018 01:56 PM  
 TSH 3.040 12/04/2017 10:17 AM  
 TSH 2.04 01/22/2017 11:11 AM  
 TSH 1.49 01/18/2017 11:12 PM  
 
 
CURRENT MEDICATIONS: 
 
Current Outpatient Medications:  
  umeclidinium-vilanterol (ANORO ELLIPTA) 62.5-25 mcg/actuation inhaler, Take 1 Puff by inhalation daily. , Disp: 1 Inhaler, Rfl: 5 
  torsemide (DEMADEX) 10 mg tablet, Take 1 Tab by mouth daily. , Disp: 30 Tab, Rfl: 0 
  cholecalciferol (VITAMIN D3) (5000 Units /125 mcg) capsule, Take 1 Cap by mouth daily. , Disp: 30 Cap, Rfl: 2 
  insulin glargine (LANTUS,BASAGLAR) 100 unit/mL (3 mL) inpn, 30 units every morning, Disp: 5 Pen, Rfl: 1   pen needle, diabetic (NOVOFINE PLUS) 32 gauge x 1/6\" ndle, 1 Each by Does Not Apply route daily. , Disp: 100 Pen Needle, Rfl: 1 
  apixaban (ELIQUIS) 5 mg tablet, Take 1 Tab by mouth two (2) times a day., Disp: 60 Tab, Rfl: 2 
  torsemide (DEMADEX) 20 mg tablet, Take 0.5 Tabs by mouth as needed (for shortness of breath or edema). FYI dose decrease by JING Gregory NP on 9/18/19 (Patient taking differently: Take 10 mg by mouth daily. FYI dose decrease by JING Gregory NP on 9/18/19), Disp: 180 Tab, Rfl: 1 
  potassium chloride (K-DUR, KLOR-CON) 20 mEq tablet, TAKE 1 TABLET BY MOUTH TWICE DAILY, Disp: 60 Tab, Rfl: 3 
  metoprolol succinate (TOPROL-XL) 50 mg XL tablet, Take 1 Tab by mouth daily. , Disp: 90 Tab, Rfl: 3 
  sacubitril-valsartan (ENTRESTO) 97 mg/103 mg tablet, Take 1 Tab by mouth two (2) times a day., Disp: 180 Tab, Rfl: 3 
  atorvastatin (LIPITOR) 40 mg tablet, TAKE ONE TABLET BY MOUTH ONCE DAILY  Indications: excessive fat in the blood, Disp: 90 Tab, Rfl: 3 
  aspirin 81 mg chewable tablet, Take 81 mg by mouth daily. , Disp: , Rfl:  
  Insulin Syringes, Disposable, 1 mL syrg, Pt to take 10 units w/ breakfast and 8 units w/ dinner, Disp: 500 Syringe, Rfl: 1 Thank you for your referral and allowing me to participate in this patient's care. Cristy Roman [phone], [address] Phone: [phone] Fax: [phone]

## 2019-12-09 NOTE — TELEPHONE ENCOUNTER
I called patient and notified him per JING Archer:  Labs stable, continue daily weights, call if need anything   Continue Current plan      He states understanding and has no questions.

## 2019-12-11 NOTE — PROGRESS NOTES
CardioVascular Consult Patient: Arvind Abdalla. MRN: [id number]  SSN: [id number] YOB: 1950  Age: 81 y.o. Sex: male Subjective:  
  
Primary Care Provider: Jess Fung MD 
 
 
Arvind Abdalla. is a 81 y.o.  male who presents for assessment of claudication. This consultation was requested by Mary Putnam and Monse Acevedo. The patient has had claudication since his heart started acting up 2-3 years ago. The right leg hurts more and with minimal activity, especially if walking fast. He underwent a procedure last year with vascular surgery on [facility] which did not help. In fact, he found the procedure extremely painful and refuses to go back. He reports a stent to the right SFA with bleeding into the thigh. No edema. No rest pain. No ulceration. No chest pain. Mild COPD. Cannot afford inhalers. Calves hurt most. Hips on the outside. Numbness shins. No ulcerations. No erythema. Past Medical History:  
Diagnosis Date  Arthritis   
 hands/fingers-OSTEO  
 CAD (coronary artery disease)   
 involving native coronary artery of native heart without angina pectoris  Cardiomyopathy ([address] 01/20/2017 A. Echo (1/19/17):  EF 5-10% with severe GHK,. Mildly dil LA. Mild TR. PASP 46./systemic cardiomyopathy  Chronic kidney disease  Chronic obstructive pulmonary disease ([address]  Chronic renal insufficiency 03/06/2017  Chronic systolic congestive heart failure, NYHA class 3 ([address] 1/8/2019  Claudication ([address] 10/9/2018  Congestive heart failure ([address]  Diabetes mellitus ([address] 3/6/2017 IDDM  Dyslipidemia 3/6/2017 A. FLP (1/19/17): Tot 120, , HDL 19, LDL 76 (no Rx).  H/O: GI bleed 3/6/2017  Hepatitis C 3/6/2017  Hypokalemia  ICD (implantable cardioverter-defibrillator) in place  Ill-defined condition   
 flu 1/2018  Neuropathy 11/19/2018  Noncompliance with medication regimen 9/11/2018  Paroxysmal atrial fibrillation ([facility] 3/6/2017  Peripheral vascular disease ([facility] 2017 A. RICKY (3/20/17): Right 0.58, Left 0.56.  Seizures ([facility] WITH HEPARIN  
 Tobacco dependence syndrome 10/9/2018  Vitamin D deficiency 2018 Past Surgical History:  
Procedure Laterality Date  CARDIAC SURG PROCEDURE UNLIST CORONARY STENTX 3 2 CORONARY, & 1 FEMORAL  
 COLONOSCOPY N/A 2017 COLONOSCOPY performed by Fredo Reis. Price Ibrahim MD at [facility] ENDOSCOPY  COLONOSCOPY N/A 2018 COLONOSCOPY performed by Frdeo Reis. Price Ibrahim MD at [facility] ENDOSCOPY  COLONOSCOPY N/A 10/22/2019 COLONOSCOPY performed by Carolina Kulkarni MD at [facility] ENDOSCOPY  
 HX CORONARY STENT PLACEMENT    
 LM, RCA x 2   
 HX ENDOSCOPY  10/22/2019 [facility]   
 HX GI    
 colonoscopy x 3 - last 2017 - polyp  HX IMPLANTABLE CARDIOVERTER DEFIBRILLATOR  HX OTHER SURGICAL    
 HX PACEMAKER    
 AICD  VASCULAR SURGERY PROCEDURE UNLIST  2017 STENT - RIGHT FEMORAL Family History Problem Relation Age of Onset  Hypertension Mother  Heart Disease Mother MURMUR  
 Cancer Father COLON  
 Colon Cancer Father  Other Sister   
     born totally handicapped/epileptic  Kidney Disease Sister   
      from renal shutdown  Heart Disease Brother  Other Brother ?pancreatic cancer or ?pancreatitis  Cancer Brother PANCREATIC? AND COLON  
 Cancer Maternal Uncle   
     toes and spread  Cancer Paternal Uncle   
     stomach  
 Heart Attack Maternal Grandfather 47  Cancer Paternal Grandfather   
     bone  Cancer Maternal Uncle   
     stomach  Cancer Maternal Uncle   
     lung  No Known Problems Son  No Known Problems Son  Anesth Problems Neg Hx Social History Tobacco Use  Smoking status: Current Every Day Smoker Packs/day: 1.00 Years: 55.00 Pack years: 55.00  Smokeless tobacco: Never Used  Tobacco comment:    
Substance Use Topics  Alcohol use: Yes Comment: 1 BEER WEEKLY Current Outpatient Medications Medication Sig  torsemide (DEMADEX) 10 mg tablet Take 10 mg by mouth daily.  umeclidinium-vilanterol (ANORO ELLIPTA) 62.5-25 mcg/actuation inhaler Take 1 Puff by inhalation daily.  cholecalciferol (VITAMIN D3) (5000 Units /125 mcg) capsule Take 1 Cap by mouth daily.  insulin glargine (LANTUS,BASAGLAR) 100 unit/mL (3 mL) inpn 30 units every morning  pen needle, diabetic (NOVOFINE PLUS) 32 gauge x 1/6\" ndle 1 Each by Does Not Apply route daily.  apixaban (ELIQUIS) 5 mg tablet Take 1 Tab by mouth two (2) times a day.  torsemide (DEMADEX) 20 mg tablet Take 0.5 Tabs by mouth as needed (for shortness of breath or edema). FYI dose decrease by JING Gudino NP on 9/18/19 (Patient taking differently: Take 10 mg by mouth daily. FYI dose decrease by JING Gudino NP on 9/18/19)  potassium chloride (K-DUR, KLOR-CON) 20 mEq tablet TAKE 1 TABLET BY MOUTH TWICE DAILY  metoprolol succinate (TOPROL-XL) 50 mg XL tablet Take 1 Tab by mouth daily.  sacubitril-valsartan (ENTRESTO) 97 mg/103 mg tablet Take 1 Tab by mouth two (2) times a day.  atorvastatin (LIPITOR) 40 mg tablet TAKE ONE TABLET BY MOUTH ONCE DAILY  Indications: excessive fat in the blood  aspirin 81 mg chewable tablet Take 81 mg by mouth daily.  Insulin Syringes, Disposable, 1 mL syrg Pt to take 10 units w/ breakfast and 8 units w/ dinner No current facility-administered medications for this visit. Allergies Allergen Reactions  Heparin (Porcine) Other (comments) Was told when in the hospital that if he received heparin again that it would be deadly. Review of Symptoms: 
Constitutional: No fevers or chills. HEET: No trauma. No visual changes. No hearing loss. No sore throat. Neck: No adenopathy or swelling. Heart: No chest pain or palpitations. Lungs: No shortness of breath. No cough. Abdomen: No pain. No nausea of vomiting. No BRBPR or melena. No diarrhea. Urology: No dysuria or hematuria. Ext: No edema. Claudication Skin: No acute rashes or ulcers. Endocrine: No heat or cold intolerance. Neuro: No transient neurologic deficits. Subjective:  
 
Visit Vitals /68 (BP 1 Location: Left arm, BP Patient Position: Sitting) Pulse 68 Resp 17 Ht 5' 10\" (1.778 m) Wt 203 lb (92.1 kg) SpO2 96% BMI 29.13 kg/m² Physical Exam: 
Head: Normocephalic, atraumatic. Eyes: Pupils equal, round, reactive to light and accomodation. , Extra ocular muscles intact. Sclera anicteric. Ears: Grossly responsive to sound. Neck: No adenopathy. No bruits. Throat: No sores or erythema. Heart: Regular rate and rhythm. Normal S1 and S2. No murmurs, gallops, or rub. Lungs: Clear to auscultation bilaterally. No wheezing, rales, or rhonchi. Abdomen: Soft, non-tender. No guarding or rebound. No hepatosplenomegaly. Bowel sounds active. Ext: No edema. No ulceration. Diminished pulses. Varicosities. Skin: Normal coloration. Warm and dry. No rash. Loss of hair over the calves. Neuro: Cranial nerves II through XII intact. Motor and sensory grossly intact. Affect: Appropriate. Alert and interactive. 11/21/2019 RICKY: 
Interpretation Summary · Right:  Significant peripheral vascular disease with a severe reduction in the resting RICKY (0.39). · Left:  Significant peripheral vascular disease with a moderate reduction in the resting RICKY (0.60). Lower Extremity Arterial Findings RICKY Right: Abnormal monophasic Doppler waveforms are present within the dorsalis pedis artery. No flow is detected within the expected position of the posterior tibial artery. The pulse volume recording obtained at the ankle is damped. The resting ankle/brachial index is severely reduced at 0.39. There was no flow detected in the right great toe. Left: Abnormal monophasic Doppler waveforms are demonstrated within the dorsalis pedis and posterior tibial arteries. The pulse volume recording obtained at the ankle is damped. The resting ankle/brachial index is moderately reduced at 0.60. The great toe index is significantly reduced at 0.31. Assessment/Plan ICD-10-CM ICD-9-CM 1. PAF (paroxysmal atrial fibrillation) ([facility]) I48.0 427.31 AMB POC EKG ROUTINE W/ 12 LEADS, INTER & REP 2. Chronic systolic congestive heart failure ([facility]) I50.22 428.22 AMB POC EKG ROUTINE W/ 12 LEADS, INTER & REP  
  428.0 3. Ischemic cardiomyopathy I25.5 414.8 4. Paroxysmal atrial fibrillation ([facility]) I48.0 427.31   
5. Coronary artery disease involving native coronary artery of native heart without angina pectoris I25.10 414.01   
6. Systolic heart failure, ACC/AHA stage C ([facility]) I50.20 428.20 7. Peripheral vascular disease ([facility]) I73.9 443.9 8. Type 2 diabetes mellitus with nephropathy ([facility]) E11.21 250.40   
  583.81 9. CKD (chronic kidney disease) stage 3, GFR 30-59 ml/min ([facility]) N18.3 585.3 10. Claudication ([facility]) I73.9 443.9 Mr. Rita Batista is a 28-year-old white male with lifestyle limiting claudication and evidence of advanced peripheral arterial disease by ankle-brachial indices. He is previously undergone right lower extremity revascularization procedure which was not beneficial, and quite frankly painful. Long conversation with him regarding the pathophysiology of peripheral arterial disease, and treatment options include medication and revitalization, and the natural history of PAD with potential ulceration and limb loss. We reviewed regular walking program; however, at this time the patient is unable to walk any significant distance. We also discussed diet and tobacco cessation. Finally we discussed imaging studies of lower extremity with an eye towards percutaneous or surgical revitalization. Given the significant limitations experiencing he would like to proceed with lower extremity interim. The patient was recently scheduled for Watchman procedure which was aborted due to left atrial appendage thrombus. This required treatment with Eliquis. We will hold this transiently for his procedure as he would like to proceed as soon as possible. Janae Ryan MD 
12/11/2019, 11:56 AM 
 
[facility] [address] Office: 
[address] [facility] [address] P: [phone] F: [phone] [facility] Office: 
[address] [facility] [address] [place] [id number] [facility] P: [phone] F: [phone]

## 2019-12-11 NOTE — PATIENT INSTRUCTIONS
's nurse Calin Later will call you to schedule your procedure Per Rema Mcpherson do not take Eliquis day prior procedure

## 2019-12-12 NOTE — TELEPHONE ENCOUNTER
2 pt identifiers used  Pt informed procedure scheduled for 10:45am on Monday, 12/16/19 at [address] [place] [address] and to be there 2 hours early. Confirmed with pt he had received instructions & he responded yes he had received instructions from Dr. Nara Hunter. He informed writer he did not have any questions. Pt instructed to call if he had any questions or concerns.  He verbalized his understanding

## 2019-12-13 NOTE — TELEPHONE ENCOUNTER
Spoke to pt about LUÍS. He was currently driving and unable to discuss details. Let him know I would mail him instructions and information and we could discuss this later. Spoke with pt concerning LUÍS procedure. (Pt IDx2). Instructions given to pt per VO of Dr. Ilya Putnam. Pt NPO after 5:30am; take all meds with sip of water in AM; have someone available to drive pt to and from procedure; pack a bag in case an overnight stay is warranted. LUÍS scheduled for 1pm on 12/27/19. Pt advised to arrive 1.5rs prior to procedure for prep. Labs in cc. Pt expressed understanding. Opportunities for questions, clarifications, and concerns provided.

## 2019-12-16 PROBLEM — I73.9 PAD (PERIPHERAL ARTERY DISEASE) (HCC): Status: ACTIVE | Noted: 2019-01-01

## 2019-12-16 NOTE — PROGRESS NOTES
1315 -   Cardiac Cath Lab Procedure Area Arrival Note:    Cherlynn Burkitt Gwynn Manifold. arrived to Cardiac Cath Lab, Procedure Area. Patient identifiers verified with NAME and DATE OF BIRTH. Procedure verified with patient. Consent forms verified. Allergies verified. Patient informed of procedure and plan of care. Questions answered with review. Patient voiced understanding of procedure and plan of care. Patient on cardiac monitor, non-invasive blood pressure, SPO2 monitor. Placed on O2 @ 3 lpm via NC.  IV of NS on pump at 25 ml/hr. Patient status doing well without problems. Patient is A&Ox 4. Patient reports no discomfort at this time. Patient medicated during procedure with orders obtained and verified by Dr. Sabra Cedeño. Refer to patients Cardiac Cath Lab PROCEDURE REPORT for vital signs, assessment, status, and response during procedure, printed at end of case. Printed report on chart or scanned into chart. 1505 - TRANSFER - OUT REPORT:    Verbal report given to [employer]) on [place] [facility].  being transferred to (unit) for routine post - op       Report consisted of patients Situation, Background, Assessment and   Recommendations(SBAR). Information from the following report(s) Procedure Summary and MAR was reviewed with the receiving nurse. Lines:   Peripheral IV 12/16/19 Left Arm (Active)   Site Assessment Clean, dry, & intact 12/16/2019 10:15 AM   Phlebitis Assessment 0 12/16/2019 10:15 AM   Infiltration Assessment 0 12/16/2019 10:15 AM   Dressing Status Clean, dry, & intact 12/16/2019 10:15 AM   Dressing Type Transparent;Tape 12/16/2019 10:15 AM   Hub Color/Line Status Pink; Infusing 12/16/2019 10:15 AM   Action Taken Open ports on tubing capped 12/16/2019 10:15 AM   Alcohol Cap Used Yes 12/16/2019 10:15 AM        Opportunity for questions and clarification was provided.       Patient transported with:   [employer]

## 2019-12-16 NOTE — PROGRESS NOTES
SBAR report received from [facility] and Pt care [facility].

## 2019-12-16 NOTE — Clinical Note
Vessel: left CFA, SFA, popliteal, peroneal, DARA and dorsalis pedis, Power injection to the artery. Single view taken.

## 2019-12-16 NOTE — PROGRESS NOTES
Interventional Cardiology Progress Note    Name: Madelyn Mann. : 1950  Date: 2019      Mr. Sridhar Feng is a 16-year-old white male with congestive heart failure, paroxysmal atrial fibrillation, diabetes mellitus, and advanced peripheral arterial disease who presents with worsening claudication. Patient has a history of percutaneous stent placement of the right SFA approximately 1.5 years ago. Unfortunately, he has developed recurrence of pain, feeling that he got no benefit from the procedure. Pain now occurs with minimal activity and occasionally at rest.  No ulcerations. Right lower extremity hurts worse than the left. RICKY on 2019 are 0.39 on the right and 0.6 on the left. He presents for further assessment. Abdominal urogram with bilateral lower extremity angiogram via left groin ultrasound access. 5 Western Suha sheath upsized to 6 Western Suha. Manual pressure applied for hemostasis. Tolerated well. 110 cc contrast.    Abdominal aorta: Patent with moderate diffuse disease. There is mild aneurysmal dilatation above the bifurcation. The renal arteries are patent with 50% narrowings bilaterally. Right lower extremity: The right iliac artery has a moderate narrowing of 50% in its proximal segment. There is moderate diffuse disease of 30 to 50% in the external iliac and common femoral artery. The superficial femoral artery has extensive disease and stenting from the proximal to the distal section. There is a faint stub in the proximal stent. Extensive collaterals are noted to the distal foot with no clear outflow into the SFA or popliteal.  Faint reconstitution of segments of the popliteal are noted. The anterior tibial artery extends to the ankle but exhibits focal disease of 70% distally. Peroneal artery is occluded proximally with faint reconstitution of the mid vessel.   The posterior tibial artery reconstitute proximally via collaterals and extends to the ankle where it truncates. Left lower extremity: The left common iliac artery has moderate disease of 30%. The external iliac has moderate disease of 50 to 60% in the proximal segment. The common femoral artery also exhibits moderate diffuse of 30 to 50%. The superficial femoral artery is occluded proxy with a short segment occlusion. Reconstitution of the proximal SFA is noted via collaterals from the profunda. Severe disease is again noted in the popliteal and trifurcation vessels. The peroneal and posterior tibial are severely diseased throughout their course extend to the ankle. The anterior tibial is occluded in its proximal and mid section; it reconstitutes distally via collaterals. As our focus was the right lower extremity, we placed a destination sheath across the leg bifurcation and probed the right superficial femoral artery at the site of occlusion. Multiple wires and catheters were utilized with partial progress; however it was clear that the occlusion had hardened and was quite extensive. It was felt that further attempts from above will be unsuccessful. Consideration will be given towards pedal access versus surgical revascularization. Assessment and plan:  Mr. Kelvin Prader is a 28-year-old white male with extensive peripheral arterial disease that is lifestyle limiting and now occurring at rest.  He is at risk for tissue loss as he is developing pain at rest.  We will discuss further options with patient including further attempts at aggressive percutaneous intervention versus surgical revascularization.       Janae Ryan MD  [phone]  12/16/19

## 2019-12-16 NOTE — H&P
History and Physical    Patient: Halley Gary MRN: [id number]  SSN: [id number]    YOB: 1950  Age: 81 y.o. Sex: male      Subjective:      Halley Gary is a 81 y.o. male who presents for assessment of claudication and peripheral arterial disease. The patient has a history of ischemic cardiomyopathy, diabetes mellitus, paroxysmal atrial fibrillation, and advanced peripheral arterial disease. He underwent percutaneous convention with stent placement to the right superficial femoral artery approxi-1.5 years ago. Unfortunately has had progression of disease and symptoms now occurring with minimal activity. Recent ankle-brachial indices in the office were 0.39 on the right and 0.6 on the left. He presents for lower extremity angiogram.    Past Medical History:   Diagnosis Date    Arthritis     hands/fingers-OSTEO    CAD (coronary artery disease)     involving native coronary artery of native heart without angina pectoris    Cardiomyopathy ([address] 01/20/2017    A. Echo (1/19/17):  EF 5-10% with severe GHK,. Mildly dil LA. Mild TR. PASP 46./systemic cardiomyopathy    Chronic kidney disease     Chronic obstructive pulmonary disease (HCC)     Chronic renal insufficiency 03/06/2017    Chronic systolic congestive heart failure, NYHA class 3 ([address] 1/8/2019    Claudication ([address] 10/9/2018    Congestive heart failure ([address]     Diabetes mellitus ([address] 3/6/2017    IDDM    Dyslipidemia 3/6/2017    A. FLP (1/19/17): Tot 120, , HDL 19, LDL 76 (no Rx).  H/O: GI bleed 3/6/2017    Hepatitis C 3/6/2017    Hypokalemia     ICD (implantable cardioverter-defibrillator) in place     Ill-defined condition     flu 1/2018     Neuropathy 11/19/2018    Noncompliance with medication regimen 9/11/2018    Paroxysmal atrial fibrillation ([address] 3/6/2017    Peripheral vascular disease ([address] 9/18/2017    A. RICKY (3/20/17): Right 0.58, Left 0.56.     Seizures ([address]     WITH HEPARIN  Tobacco dependence syndrome 10/9/2018    Vitamin D deficiency 2018     Past Surgical History:   Procedure Laterality Date    CARDIAC SURG PROCEDURE UNLIST      CORONARY STENTX 3 2 CORONARY, & 1 FEMORAL    COLONOSCOPY N/A 2017    COLONOSCOPY performed by Mickie Santo. Carolyn Chicas MD at [address] COLONOSCOPY N/A 2018    COLONOSCOPY performed by Mickie Santo. Carolyn Chicas MD at [facility] ENDOSCOPY    COLONOSCOPY N/A 10/22/2019    COLONOSCOPY performed by Anna Molina MD at [facility] ENDOSCOPY    HX CORONARY STENT PLACEMENT      LM, RCA x 2     HX ENDOSCOPY  10/22/2019    [facility]     HX GI      colonoscopy x 3 - last 2017 - polyp    HX IMPLANTABLE CARDIOVERTER DEFIBRILLATOR      HX OTHER SURGICAL      HX PACEMAKER      AICD    VASCULAR SURGERY PROCEDURE UNLIST  2017    STENT - RIGHT FEMORAL       Family History   Problem Relation Age of Onset    Hypertension Mother     Heart Disease Mother         MURMUR    Cancer Father         COLON    Colon Cancer Father     Other Sister         born totally handicapped/epileptic    Kidney Disease Sister          from renal shutdown    Heart Disease Brother     Other Brother         ?pancreatic cancer or ?pancreatitis    Cancer Brother         PANCREATIC? AND COLON    Cancer Maternal Uncle         toes and spread    Cancer Paternal Uncle         stomach    Heart Attack Maternal Grandfather 47    Cancer Paternal Grandfather         bone    Cancer Maternal Uncle         stomach    Cancer Maternal Uncle         lung    No Known Problems Son     No Known Problems Son     Anesth Problems Neg Hx      Social History     Tobacco Use    Smoking status: Current Every Day Smoker     Packs/day: 1.00     Years: 55.00     Pack years: 55.00    Smokeless tobacco: Never Used    Tobacco comment:     Substance Use Topics    Alcohol use: Yes     Comment: 1 BEER WEEKLY      Prior to Admission medications    Medication Sig Start Date End Date Taking?  Authorizing Provider   insulin NPH/insulin regular (NOVOLIN 70/30 U-100 INSULIN) 100 unit/mL (70-30) injection 10 Units by SubCUTAneous route Daily (before breakfast). Yes Provider, Historical   insulin NPH/insulin regular (NOVOLIN 70/30 U-100 INSULIN) 100 unit/mL (70-30) injection 8 Units by SubCUTAneous route Daily (before dinner). Yes Provider, Historical   apixaban (ELIQUIS) 5 mg tablet Take 1 Tab by mouth two (2) times a day. 11/21/19  Yes Dahlia Santillan MD   torsemide [facility]) 20 mg tablet Take 0.5 Tabs by mouth as needed (for shortness of breath or edema). FYI dose decrease by JING Choi NP on 9/18/19  Patient taking differently: Take 10 mg by mouth daily. FYI dose decrease by JING Choi NP on 9/18/19 9/18/19  Yes Rebekah Gomez NP   sacubitril-valsartan (ENTRESTO) 97 mg/103 mg tablet Take 1 Tab by mouth two (2) times a day. 7/17/19  Yes Rebekah Gomez NP   cholecalciferol (VITAMIN D3) (5000 Units /125 mcg) capsule Take 1 Cap by mouth daily. 12/2/19   Rebekah Gomez NP   pen needle, diabetic (NOVOFINE PLUS) 32 gauge x 1/6\" ndle 1 Each by Does Not Apply route daily. 11/26/19   Thee Ronquillo MD   potassium chloride (K-DUR, KLOR-CON) 20 mEq tablet TAKE 1 TABLET BY MOUTH TWICE DAILY 9/14/19   Alessia Paredes MD   metoprolol succinate (TOPROL-XL) 50 mg XL tablet Take 1 Tab by mouth daily. 9/9/19   Rebekah Gomez NP   atorvastatin (LIPITOR) 40 mg tablet TAKE ONE TABLET BY MOUTH ONCE DAILY  Indications: excessive fat in the blood 4/2/19   Alessia Paredes MD   aspirin 81 mg chewable tablet Take 81 mg by mouth daily. Provider, Historical   Insulin Syringes, Disposable, 1 mL syrg Pt to take 10 units w/ breakfast and 8 units w/ dinner 2/00/75   GREGORY Colon        Allergies   Allergen Reactions    Heparin (Porcine) Other (comments)     Was told when in the hospital that if he received heparin again that it would be deadly.        Review of Systems:  A comprehensive review of systems was negative except for that written in the History of Present Illness. Objective:     Vitals:    12/16/19 1502 12/16/19 1515 12/16/19 1530 12/16/19 1545   BP: 168/89 150/69 135/70 151/65   Pulse: 68 63 63 62   Resp: 12      Temp:       SpO2: 100% 96% 92% 93%   Weight:       Height:            Physical Exam:  General:  Alert, cooperative, no distress, appears stated age. Eyes:  Conjunctivae/corneas clear. PERRL, EOMs intact. Fundi benign   Ears:  Normal TMs and external ear canals both ears. Nose: Nares normal. Septum midline. Mucosa normal. No drainage or sinus tenderness. Mouth/Throat: Lips, mucosa, and tongue normal. Teeth and gums normal.   Neck: Supple, symmetrical, trachea midline, no adenopathy, thyroid: no enlargment/tenderness/nodules, no carotid bruit and no JVD. Back:   Symmetric, no curvature. ROM normal. No CVA tenderness. Lungs:   Diminished bilaterally   Heart:  Irregular rate and rhythm, autumn   Abdomen:   Soft, non-tender. Bowel sounds normal. No masses,  No organomegaly. Extremities: Extremities normal, atraumatic, no cyanosis or edema. Pulses: Severely diminished   Skin: Skin color, texture, turgor normal. No rashes or lesions   Lymph nodes: Cervical, supraclavicular, and axillary nodes normal.   Neurologic: CNII-XII intact. Normal strength, sensation and reflexes throughout. 12/16/19   INVASIVE VASCULAR PROCEDURE 12/16/2019 12/16/2019    Narrative Mr. Sridhar Feng is a 53-year-old white male with congestive heart failure,   paroxysmal atrial fibrillation, diabetes mellitus, and advanced peripheral   arterial disease who presents with worsening claudication. Patient has a   history of percutaneous stent placement of the right SFA approximately 1.5   years ago. Unfortunately, he has developed recurrence of pain, feeling   that he got no benefit from the procedure. Pain now occurs with minimal   activity and occasionally at rest.  No ulcerations.   Right lower extremity   hurts worse than the left. RICKY on 11/29/2019 are 0.39 on the right and   0.6 on the left. He presents for further assessment. Abdominal urogram with bilateral lower extremity angiogram via left groin   ultrasound access. 5 Western Suha sheath upsized to 6 Western Suha. Manual pressure   applied for hemostasis. Tolerated well. 110 cc contrast.    Abdominal aorta: Patent with moderate diffuse disease. There is mild   aneurysmal dilatation above the bifurcation. The renal arteries are   patent with 50% narrowings bilaterally. Right lower extremity: The right iliac artery has a moderate narrowing of   50% in its proximal segment. There is moderate diffuse disease of 30 to   50% in the external iliac and common femoral artery. The superficial   femoral artery has extensive disease and stenting from the proximal to the   distal section. There is a faint stub in the proximal stent. Extensive   collaterals are noted to the distal foot with no clear outflow into the   SFA or popliteal.  Faint reconstitution of segments of the popliteal are   noted. The anterior tibial artery extends to the ankle but exhibits focal   disease of 70% distally. Peroneal artery is occluded proximally with   faint reconstitution of the mid vessel. The posterior tibial artery   reconstitute proximally via collaterals and extends to the ankle where it   truncates. Left lower extremity: The left common iliac artery has moderate disease of   30%. The external iliac has moderate disease of 50 to 60% in the proximal   segment. The common femoral artery also exhibits moderate diffuse of 30   to 50%. The superficial femoral artery is occluded proxy with a short   segment occlusion. Reconstitution of the proximal SFA is noted via   collaterals from the profunda. Severe disease is again noted in the   popliteal and trifurcation vessels.   The peroneal and posterior tibial are   severely diseased throughout their course extend to the ankle. The   anterior tibial is occluded in its proximal and mid section; it   reconstitutes distally via collaterals. As our focus was the right lower extremity, we placed a destination sheath   across the leg bifurcation and probed the right superficial femoral artery   at the site of occlusion. Multiple wires and catheters were utilized with   partial progress; however it was clear that the occlusion had hardened and   was quite extensive. It was felt that further attempts from above will be   unsuccessful. Consideration will be given towards pedal access versus   surgical revascularization. Assessment and plan:  Mr. Maty Reis is a 82-year-old white male with extensive peripheral   arterial disease that is lifestyle limiting and now occurring at rest.  He   is at risk for tissue loss as he is developing pain at rest.  We will   discuss further options with patient including further attempts at   aggressive percutaneous intervention versus surgical revascularization. Signed by: Eva Batista MD     Assessment:     Hospital Problems  Date Reviewed: 12/11/2019          Codes Class Noted POA    PAD (peripheral artery disease) ([facility] ICD-10-CM: I73.9  ICD-9-CM: 443.9  12/16/2019 Unknown              Plan:     Mr. Maty Reis is a 82-year-old white male with advanced peripheral arterial disease. He underwent lower extremity angiogram today showing occlusion of the right superficial femoral artery stent. Disease is quite extensive with no significant filling of the popliteal artery. Trifurcation vessels are also severely diseased. He will be observed overnight for hydration. We will confer with our surgical colleagues to discuss treatment options.       Edson Rowe MD  12/16/2019, 3:52 PM    [facility] Office:  [address] [place] [address]   [address] [facility] [address]  [address]  P: [phone]  F: 9 [facility] Office:  [address] [place] [id number] [facility]  P: [phone]  F: [phone]

## 2019-12-16 NOTE — PROGRESS NOTES
1700 - SHEATH PULL NOTE:    Patient informed of procedure with questions answered with review. Sheath site prepped with Chloraprep swab. 6 fr sheath in left femoral artery pulled by Mason Rodrigues RN. Hand hold and Quick clot & 4x4, with manual compression to site. Initially encountered difficulty obtaining hemostasis with hand hold and enlisted additional help from Kacy, technician. Hemostasis obtained with hand hold/manual compression at site. Patient tolerated well. No change in status. Handhold for 30 minutes. No change at site. Quick clot dressing applied to site. No bleeding, no hematoma, no pain/discomfort at site. Groin instructions provided with review. Continue to monitor procedure site and patient status. *Advised patient to keep head flat and extremity flat to decrease risk of bleeding. *Recommended that patient not drink for ONE HOUR post sheath pull completion. *Recommended that patient not eat for TWO HOURS post sheath pull completion. *Instructed patient on rationale for delay of PO products to decrease risk for aspiration and if additional treatment to procedure site is required. Patient verbalized understanding of instructions with review.

## 2019-12-16 NOTE — PROGRESS NOTES
Spoke to Dr. Miguel Camarena regarding difficulty obtaining hemostasis - if necessary may use femostop and place patient in CCU / unit bed. 1730 - Hemostasis achieved with manual compression and Dr. Symone Payan came to bedside to see patient. Dr. Miguel Camarena updated. 18 - Spoke with Dr. Miguel Camarena and he would recommend a femostop for patient since he moves freqently and has vascular disease with high risk for bleeding. Will transfer to CCU and place femostop. 1800 - Report given to Fernando Escoto RN.

## 2019-12-16 NOTE — PROGRESS NOTES
TRANSFER - OUT REPORT:    Verbal report given to [facility] ROHITH VILLA RN on [address]. being transferred to CCU 23 for routine progression of care       Report consisted of patients Situation, Background, Assessment and   Recommendations(SBAR). Information from the following report(s) MAR, Procedure, Post Procedure Cardiac rhythm and vital signs was reviewed with the receiving nurse. Opportunity for questions and clarification was provided.

## 2019-12-16 NOTE — Clinical Note
Vessel: right CFA, SFA, popliteal, peroneal and DARA, Power injection to the artery. Single view taken.

## 2019-12-16 NOTE — PROGRESS NOTES
5876 -   Cardiac Cath Lab Recovery Arrival Note:      Angel Mask. arrived to Cardiac Cath Lab, Recovery Area. Staff introduced to patient. Patient identifiers verified with NAME and DATE OF BIRTH. Procedure verified with patient. Consent forms reviewed and signed by patient or authorized representative and verified. Allergies verified. Patient and family oriented to department. Patient and family informed of procedure and plan of care. Questions answered with review. Patient prepped for procedure, per orders from physician, prior to arrival.    Patient on cardiac monitor, non-invasive blood pressure, SPO2 monitor. On room air. Patient is A&Ox 4. Patient reports no complaints. Patient in stretcher, in low position, with side rails up, call bell within reach, patient instructed to call if assistance as needed. Patient prep in: [address], [place] [address]. Family in: waiting room.    Prep by: Vicki Beavers RN

## 2019-12-16 NOTE — PROGRESS NOTES
Femstop placed as per Dr Margaret Green. Pt verbalized understanding and tolerating well.     Femstop=47

## 2019-12-16 NOTE — PROGRESS NOTES
1510 - TRANSFER - IN REPORT:    Verbal report received from [facility] on [address].  being received from procedure for routine progression of care. Report consisted of patients Situation, Background, Assessment and Recommendations(SBAR). Information from the following report(s) Procedure Summary was reviewed with the receiving clinician. Opportunity for questions and clarification was provided. Assessment completed upon patients arrival to [address] [place] [address] and care assumed. Cardiac Cath Lab Recovery Arrival Note:    [address]. arrived to [facility] recovery area. Patient procedure= angiogram. Patient on cardiac monitor, non-invasive blood pressure, SPO2 monitor. On O2 @ 2 lpm via nasal cannula. IV  of 0.9% normal saline on pump at 100 ml/hr. Patient status doing well without problems. Patient is A&Ox 4. Patient reports no complaints. PROCEDURE SITE CHECK:    #6 fr sheath in left femoral artery in place and capped, no pain/discomfort reported at procedure site. No change in patient status. Continue to monitor patient and status.

## 2019-12-17 NOTE — DISCHARGE SUMMARY
Discharge Summary     Patient: Rufus Palacios MRN: [id number]  SSN: [id number]    YOB: 1950  Age: 81 y.o.   Sex: male       Admit Date: 12/16/2019    Discharge Date: 12/17/2019      Admission Diagnoses: Peripheral vascular disease, unspecified ([facility] [I73.9];PAD (peripheral artery disease) ([facility] [I73.9]    Discharge Diagnoses:   Problem List as of 12/17/2019 Date Reviewed: 12/11/2019          Codes Class Noted - Resolved    PAD (peripheral artery disease) ([place] [address] ICD-10-CM: I73.9  ICD-9-CM: 443.9  12/16/2019 - Present        Presence of Watchman left atrial appendage closure device ICD-10-CM: Z95.818  ICD-9-CM: V45.09  11/21/2019 - Present        CKD (chronic kidney disease) stage 3, GFR 30-59 ml/min ([facility]) ICD-10-CM: N18.3  ICD-9-CM: 585.3  11/26/2019 - Present        Vitamin deficiency ICD-10-CM: E56.9  ICD-9-CM: 269.2  1/29/2019 - Present        Chronic systolic congestive heart failure, NYHA class 3 ([place] [address].) ICD-10-CM: I50.22  ICD-9-CM: 428.22, 428.0  1/8/2019 - Present        Type 2 diabetes mellitus with diabetic neuropathy ([facility] ICD-10-CM: E11.40  ICD-9-CM: 250.60, 357.2  12/6/2018 - Present        Neuropathy ICD-10-CM: G62.9  ICD-9-CM: 355.9  11/19/2018 - Present        Claudication ([facility] ICD-10-CM: I73.9  ICD-9-CM: 443.9  10/9/2018 - Present        Tobacco dependence syndrome ICD-10-CM: F17.200  ICD-9-CM: 305.1  10/9/2018 - Present        Noncompliance with medication regimen ICD-10-CM: Z91.14  ICD-9-CM: V15.81  9/11/2018 - Present        Proteinuria ICD-10-CM: R80.9  ICD-9-CM: 791.0  6/6/2018 - Present        Vitamin D deficiency ICD-10-CM: E55.9  ICD-9-CM: 268.9  6/4/2018 - Present        Polyp of colon ICD-10-CM: K63.5  ICD-9-CM: 211.3  3/5/2018 - Present        Advance care planning ICD-10-CM: Z71.89  ICD-9-CM: V65.49  3/5/2018 - Present        Type 2 diabetes mellitus with nephropathy ([facility] ICD-10-CM: E11.21  ICD-9-CM: 250.40, 583.81  1/11/2018 - Present        Hypokalemia ICD-10-CM: E87.6  ICD-9-CM: 276.8  1/11/2018 - Present        ICD (implantable cardioverter-defibrillator) in place ICD-10-CM: Z95.810  ICD-9-CM: V45.02  12/4/2017 - Present        Peripheral vascular disease ([facility] ICD-10-CM: I73.9  ICD-9-CM: 443.9  9/18/2017 - Present    Overview Signed 9/18/2017  3:05 PM by MD JOSSIE Henderson  RICKY (3/20/17): Right 0.58, Left 0.56. Coronary artery disease involving native heart without angina pectoris ICD-10-CM: I25.10  ICD-9-CM: 414.01  8/17/2017 - Present        Systolic heart failure, ACC/AHA stage C (HCC) ICD-10-CM: I50.20  ICD-9-CM: 428.20  8/17/2017 - Present        Type 2 diabetes mellitus with complication (HCC) BGE-25-DM: E11.8  ICD-9-CM: 250.90  8/17/2017 - Present        Paroxysmal atrial fibrillation (HCC) ICD-10-CM: I48.0  ICD-9-CM: 427.31  3/6/2017 - Present        H/O: GI bleed ICD-10-CM: Z87.19  ICD-9-CM: V12.79  3/6/2017 - Present        Hepatitis C ICD-10-CM: B19.20  ICD-9-CM: 070.70  3/6/2017 - Present        Chronic renal insufficiency ICD-10-CM: N18.9  ICD-9-CM: 585.9  3/6/2017 - Present        Dyslipidemia ICD-10-CM: E78.5  ICD-9-CM: 272.4  3/6/2017 - Present    Overview Signed 3/6/2017  1:47 PM by MD JOSSIE Henderson FLP (1/19/17): Tot 120, , HDL 19, LDL 76 (no Rx). Ischemic cardiomyopathy ICD-10-CM: I25.5  ICD-9-CM: 414.8  1/20/2017 - Present    Overview Addendum 9/18/2017  2:24 PM by MD JOSSIE Henderson Echo (1/19/17):  EF 5-10% with severe GHK,. Mildly dil LA. Mild TR. PASP 46. B. Cath (1/20/17):  LM ost70. LAD m50. D1 80. LCx d70; OM1 99, OM2 90. RCA p100. No AVG. PAP  53/27/36. C.  PCI (2/9/17): pRCA 2.5x38 Xience + 2.75x12 Xience. ostLM 4.0x12 Xience post-dil with 4.5x12 NC. D.  Echo (2/13/17):  EF 10% with severe GHK, PSM. Dil LA. Mod MR. Mild to mod TR. Left pleural effusion. E.  Echo (5/15/17): EF 25% with severe GHK and basl-mid inf AK, mildly dil LV, DD. Sev dil LA. Mod MR. Mild TR.   PASP 28.  F.  ICD (6/12/17, Anup): [employer]. NAVARRO (dyspnea on exertion) ICD-10-CM: R06.09  ICD-9-CM: 786.09  1/19/2017 - Present        RESOLVED: ICD (implantable cardioverter-defibrillator), single, in situ ICD-10-CM: Z95.810  ICD-9-CM: V45.02  7/12/2017 - 9/18/2017        RESOLVED: Diabetes mellitus ([facility] ICD-10-CM: E11.9  ICD-9-CM: 250.00  3/6/2017 - 8/17/2017        RESOLVED: Systolic heart failure ([facility] ICD-10-CM: I50.20  ICD-9-CM: 428.20  1/20/2017 - 3/6/2017        RESOLVED: Acute systolic (congestive) heart failure ([facility] ICD-10-CM: I50.21  ICD-9-CM: 428.21, 428.0  1/19/2017 - 3/6/2017        RESOLVED: Bilateral lower extremity edema ICD-10-CM: R60.0  ICD-9-CM: 782.3  1/19/2017 - 3/6/2017        RESOLVED: Acute renal failure (ARF) ([facility] ICD-10-CM: N17.9  ICD-9-CM: 584.9  1/19/2017 - 3/6/2017        RESOLVED: Hyperglycemia due to type 2 diabetes mellitus ([facility] ICD-10-CM: E11.65  ICD-9-CM: 250.00  1/19/2017 - 3/6/2017        RESOLVED: Venous stasis dermatitis of both lower extremities ICD-10-CM: I87.2  ICD-9-CM: 454.1  1/19/2017 - 3/6/2017        RESOLVED: Hypoxia ICD-10-CM: R09.02  ICD-9-CM: 799.02  1/19/2017 - 3/6/2017        RESOLVED: Pulmonary edema ICD-10-CM: J81.1  ICD-9-CM: 624  1/19/2017 - 3/6/2017        RESOLVED: Sinus tachycardia ICD-10-CM: R00.0  ICD-9-CM: 427.89  1/19/2017 - 3/6/2017               Discharge Condition: Good    Hospital Course: Lower extremity angiogram for Pad and worsening claudication. 12/16/19   INVASIVE VASCULAR PROCEDURE 12/16/2019 12/16/2019    Narrative Mr. Jt Giraldo is a 72-year-old white male with congestive heart failure,   paroxysmal atrial fibrillation, diabetes mellitus, and advanced peripheral   arterial disease who presents with worsening claudication. Patient has a   history of percutaneous stent placement of the right SFA approximately 1.5   years ago. Unfortunately, he has developed recurrence of pain, feeling   that he got no benefit from the procedure. Pain now occurs with minimal   activity and occasionally at rest.  No ulcerations. Right lower extremity   hurts worse than the left. RICKY on 11/29/2019 are 0.39 on the right and   0.6 on the left. He presents for further assessment. Abdominal urogram with bilateral lower extremity angiogram via left groin   ultrasound access. 5 Western Suha sheath upsized to 6 Western Suha. Manual pressure   applied for hemostasis. Tolerated well. 110 cc contrast.    Abdominal aorta: Patent with moderate diffuse disease. There is mild   aneurysmal dilatation above the bifurcation. The renal arteries are   patent with 50% narrowings bilaterally. Right lower extremity: The right iliac artery has a moderate narrowing of   50% in its proximal segment. There is moderate diffuse disease of 30 to   50% in the external iliac and common femoral artery. The superficial   femoral artery has extensive disease and stenting from the proximal to the   distal section. There is a faint stub in the proximal stent. Extensive   collaterals are noted to the distal foot with no clear outflow into the   SFA or popliteal.  Faint reconstitution of segments of the popliteal are   noted. The anterior tibial artery extends to the ankle but exhibits focal   disease of 70% distally. Peroneal artery is occluded proximally with   faint reconstitution of the mid vessel. The posterior tibial artery   reconstitute proximally via collaterals and extends to the ankle where it   truncates. Left lower extremity: The left common iliac artery has moderate disease of   30%. The external iliac has moderate disease of 50 to 60% in the proximal   segment. The common femoral artery also exhibits moderate diffuse of 30   to 50%. The superficial femoral artery is occluded proxy with a short   segment occlusion. Reconstitution of the proximal SFA is noted via   collaterals from the profunda.   Severe disease is again noted in the   popliteal and trifurcation vessels. The peroneal and posterior tibial are   severely diseased throughout their course extend to the ankle. The   anterior tibial is occluded in its proximal and mid section; it   reconstitutes distally via collaterals. As our focus was the right lower extremity, we placed a destination sheath   across the leg bifurcation and probed the right superficial femoral artery   at the site of occlusion. Multiple wires and catheters were utilized with   partial progress; however it was clear that the occlusion had hardened and   was quite extensive. It was felt that further attempts from above will be   unsuccessful. Consideration will be given towards pedal access versus   surgical revascularization. Assessment and plan:  Mr. Maru Card is a 53-year-old white male with extensive peripheral   arterial disease that is lifestyle limiting and now occurring at rest.  He   is at risk for tissue loss as he is developing pain at rest.  We will   discuss further options with patient including further attempts at   aggressive percutaneous intervention versus surgical revascularization. Signed by: Paola Mcmullen MD         Consults: Vascular Surgery    Significant Diagnostic Studies: angiography: TINA    Disposition: home    Discharge Medications:   Current Discharge Medication List      CONTINUE these medications which have NOT CHANGED    Details   !! insulin NPH/insulin regular (NOVOLIN 70/30 U-100 INSULIN) 100 unit/mL (70-30) injection 10 Units by SubCUTAneous route Daily (before breakfast). !! insulin NPH/insulin regular (NOVOLIN 70/30 U-100 INSULIN) 100 unit/mL (70-30) injection 8 Units by SubCUTAneous route Daily (before dinner). sacubitril-valsartan (ENTRESTO) 49 mg/51 mg tablet Take 1 Tab by mouth two (2) times a day. apixaban (ELIQUIS) 5 mg tablet Take 1 Tab by mouth two (2) times a day.   Qty: 60 Tab, Refills: 2      torsemide (DEMADEX) 20 mg tablet Take 0.5 Tabs by mouth as needed (for shortness of breath or edema). FYI dose decrease by JING Ruth NP on 9/18/19  Qty: 180 Tab, Refills: 1      cholecalciferol (VITAMIN D3) (5000 Units /125 mcg) capsule Take 1 Cap by mouth daily. Qty: 30 Cap, Refills: 2    Associated Diagnoses: Vitamin D deficiency      pen needle, diabetic (NOVOFINE PLUS) 32 gauge x 1/6\" ndle 1 Each by Does Not Apply route daily. Qty: 100 Pen Needle, Refills: 1    Associated Diagnoses: Type 2 diabetes mellitus with complication (HCC)      potassium chloride (K-DUR, KLOR-CON) 20 mEq tablet TAKE 1 TABLET BY MOUTH TWICE DAILY  Qty: 60 Tab, Refills: 3    Comments: Please consider 90 day supplies to promote better adherence  Associated Diagnoses: Systolic heart failure, ACC/AHA stage C ([address] Shortness of breath      metoprolol succinate (TOPROL-XL) 50 mg XL tablet Take 1 Tab by mouth daily. Qty: 90 Tab, Refills: 3    Comments: Please advise patient not to cut pill with adjusted dose      atorvastatin (LIPITOR) 40 mg tablet TAKE ONE TABLET BY MOUTH ONCE DAILY  Indications: excessive fat in the blood  Qty: 90 Tab, Refills: 3    Associated Diagnoses: Dyslipidemia      aspirin 81 mg chewable tablet Take 81 mg by mouth daily. Insulin Syringes, Disposable, 1 mL syrg Pt to take 10 units w/ breakfast and 8 units w/ dinner  Qty: 500 Syringe, Refills: 1       !! - Potential duplicate medications found. Please discuss with provider. STOP taking these medications       sacubitril-valsartan (ENTRESTO) 97 mg/103 mg tablet Comments:   Reason for Stopping:               Activity: Activity as tolerated  Diet: Diabetic Diet  Wound Care: Keep wound clean and dry    Follow-up Appointments   Procedures    FOLLOW UP VISIT Appointment in: Two Weeks Dr. Javier Bowling at [facility]     Dr. Javier Bowling at [facility]     Standing Status:   Standing     Number of Occurrences:   1     Order Specific Question:   Appointment in     Answer:    Two Weeks       Signed By: Javier Bowling MD     December 17, 2019

## 2019-12-17 NOTE — PROGRESS NOTES
Problem: Falls - Risk of  Goal: *Absence of Falls  Description  Document [place] Fall Risk and appropriate interventions in the flowsheet. Outcome: Progressing Towards Goal  Note: Fall Risk Interventions:            Medication Interventions: Evaluate medications/consider consulting pharmacy                   Problem: Patient Education: Go to Patient Education Activity  Goal: Patient/Family Education  Outcome: Progressing Towards Goal     Problem: Pain  Goal: *Control of Pain  Outcome: Progressing Towards Goal  Goal: *PALLIATIVE CARE:  Alleviation of Pain  Outcome: Progressing Towards Goal     Problem: Pressure Injury - Risk of  Goal: *Prevention of pressure injury  Description  Document Trey Scale and appropriate interventions in the flowsheet. Outcome: Progressing Towards Goal  Note: Pressure Injury Interventions:             Activity Interventions: Pressure redistribution bed/mattress(bed type)         Nutrition Interventions: Document food/fluid/supplement intake                     Problem: Cath Lab Procedures: Post-Cath Day 1  Goal: Off Pathway (Use only if patient is Off Pathway)  Outcome: Progressing Towards Goal  Goal: Activity/Safety  Outcome: Progressing Towards Goal  Goal: Diagnostic Test/Procedures  Outcome: Progressing Towards Goal  Goal: Nutrition/Diet  Outcome: Progressing Towards Goal  Goal: Discharge Planning  Outcome: Progressing Towards Goal  Goal: Medications  Outcome: Progressing Towards Goal  Goal: Respiratory  Outcome: Progressing Towards Goal  Goal: Treatments/Interventions/Procedures  Outcome: Progressing Towards Goal  Goal: Psychosocial  Outcome: Progressing Towards Goal

## 2019-12-17 NOTE — PROGRESS NOTES
I have reviewed discharge instructions with the patient. The patient verbalized understanding. Pt refused wheelchair escort. Wife will be picking him up. Cath sites look good.

## 2019-12-17 NOTE — PROGRESS NOTES
1930 Bedside and Verbal shift change report given to Cat RN (oncoming nurse) by Bernard Davis RN (offgoing nurse). Report included the following information SBAR.     2100 Pt refused bath and bed change    0000 Femstop deflated per protocol during shift. No air left at 0000. Kept on groin per Jessica Blackwood MD's orders. Will continue to monitor closely. 0400 Labs drawn    0730 Bedside and Verbal shift change report given to Grace Hart (oncoming nurse) by Cat RN (offgoing nurse). Report included the following information SBAR.

## 2019-12-17 NOTE — PROGRESS NOTES
TRANSFER - IN REPORT:    Verbal report received from [facility]) on Fernanda Torres.  being received from Cath Lab(unit) for routine progression of care      Report consisted of patients Situation, Background, Assessment and   Recommendations(SBAR). Information from the following report(s) SBAR, Kardex, [facility] and Recent Results was reviewed with the receiving nurse. Opportunity for questions and clarification was provided. Assessment completed upon patients arrival to unit and care assumed. 2000:  Bedside shift change report given to Glo RN (oncoming nurse) by Kostas Aviles RN (offgoing nurse). Report included the following information SBAR, Kardex, MAR and Recent Results.      Primary Nurse Shanelle Degroot, WILLIAM and Lenard Myers RN performed a dual skin assessment on this patient No impairment noted    Current Bed:     DOROTHY Dotson      Trey score is 23

## 2019-12-17 NOTE — CONSULTS
Vascular Surgery  --full note to follow  --82 yo patient with multiple medical problems known to us for PVD; I saw him as 2nd opinion in 2018  --he had early failure of percutaneous revascularization and was being prepared for fem-pop bypass but chose not go forward because he was upset and had pain  --images suggest fem-at as best bypass option  --he will decide and contact us as outpatient.

## 2019-12-17 NOTE — DIABETES MGMT
[facility]    [facility] Consult Note    Recommendations/ Comments: Consult received for medication recommendations due to pt being on mixed insuiln at home. Novolin 70/30, 10 units each AM and 8 units each PM; TDD 18 units. This provides 12 units of basal insulin and 6 units of meal time insulin. A1c elevated indicative of need for adjustment in home medication. BG's yesterday > 200 mg/dL. FBG today 195 mg/dL with addition of Lantus 10 units at bedtime last night  Carb Consistent added to current diet order today    Please consider the following:  Increase Lantus to 12 units  Add lispro 3 units AC TID    Current hospital DM medication:   Lantus 10 units at bedtime  Lispro normal sensitivity correction scale      DTC will continue to follow patient as needed. ____________________________    Consult received for:   []           Hospital Medication Recommendations                 [x]           Hospital Blood Glucose Management    Chart reviewed and initial evaluation complete on Fernanda Ventura .    Patient is a 81 y.o. male with known DM on Novolin 70/30, 10 units each Am and 8 untis each PM      A1c:   Lab Results   Component Value Date/Time    Hemoglobin A1c 9.9 (H) 11/14/2019 03:48 PM       Recent Glucose Results:   Lab Results   Component Value Date/Time     (H) 12/17/2019 04:04 AM    GLUCPOC 191 (H) 12/17/2019 06:39 AM    GLUCPOC 232 (H) 12/16/2019 09:29 PM    GLUCPOC 255 (H) 12/16/2019 09:21 PM        Lab Results   Component Value Date/Time    Creatinine 2.15 (H) 12/17/2019 04:04 AM       Active Orders   Diet    DIET CARDIAC Regular        PO intake:   Patient Vitals for the past 72 hrs:   % Diet Eaten   12/17/19 0846 90 %       Thank you.   Maribel Horne RN, CDE

## 2019-12-17 NOTE — PROGRESS NOTES
ARNOLD Plan:    Patient discharging today; wife to transport home        Observation notice provided in writing to patient and/or caregiver as well as verbal explanation of the policy. Patients who are in outpatient status also receive the Observation notice.       Skye Toledo MPH

## 2019-12-18 NOTE — PROGRESS NOTES
Social Work Assessment Date of referral: 11-22-19 Referral received from: Heather Acevedo RN 
Reason for referral: Medication assistance Previous  Referral:  No 
 
Income Information (if needed): N/A Support System: Spouse · Medication Cost assistance needed: (ANORO ELLIPTA) 62.5-25 mcg/actuation inhaler- cost for medication $108.00 · Eloquis- He has 2 free coupons for medication that will last him throughout his doughnut hole phase. · Suze Baeza- He is currently receiving patient medication assistance through the [facility] for this med. · Lantus- recently prescribed med cost $508.00, [address] has a generic brand that is affordable. He contacted his PCP to make them aware of the cost of medication and to see if they can prescribe him the generic brand that he can afford. Referral to LTC/Medicaid needed: N/A Referral to Medicaid extra Help/LIS program needed: Patient does not qualify for program due to over income limit Small home repair needed: N/A Other: SW will mail patient a blank copy of AMD docuement and Your Right to Decide packet. Goals Addressed This Visit's Progress  Identification of barriers to adherence to a plan of care such as inability to afford medications, lack of insurance, lack of transportation, etc.     
   
12/18/2019  11:30am 
SW contacted patient to inquire about assistance needed. Patient reported that he is in the doughnut hole and can not afford to pay for some of his medication co-pays, which includes, an inhaler, Eloquis, Entresto and Lantus. He does not qualify for medicaid at this time. · (ANORO ELLIPTA) 62.5-25 mcg/actuation inhaler- cost for medication $108.00 · Eloquis- He has 2 free coupons for medication that will last him throughout his doughnut hole phase. · Suze Baeza- He is currently receiving patient medication assistance through the [facility] for this med. · Lantus- recently prescribed med cost $508.00, [address] has a generic brand that is affordable. He contacted his PCP to make them aware of the cost of medication and to see if they can prescribe him the generic brand that he can afford. DONOVAN will mail patient an [employer] medication assistance form to review/ complete for possible participation in their program.  Leslie Maury will contact at a later date and offer assistance if needed. [address] [place] [address] Team  12/13/2019 2:30pm 
It was reported that patient is in need of assistance with paying for his medications. SW called and left a detail message requesting a call back to discuss needs. DONOVAN will attempt contact at a later date if call is not returned. [address] [place] [address] Team  11/22/19-   Review of EMR - some notes about adherence with medications and about affording medications- reached out to MSW in [facility] clinic to see if she has insight to share and/or if she can assist.   
He shared with me today- the 30 days supply of Eliquis cost him over $120- relayed this to EP provider's nurse- he is most likely in [facility] and if he needs to stay on Eliquis past the initial 30 days - office will need to help out with samples until after January 1st- relayed this information to the vendor to talk with cardiac cath lab-EP Lab at [facility] to make sure they are aware. Reached out to advance [facility] clinic- they are helping him with renewal for PA for Entresto. He does have Part D. She relays that his income is above guidelines for medicaid assistance- Medication Assistance with medicare. Sent referral ask our MSW to help with screening and look for PA options for his other medications. Reached out to [facility] [employer] department about notes about previous FA application. Will determine next steps if available.        LLC  
  
  Prepare patients and caregivers for end of life decisions (ie. need for hospice, pain management, symptom relief, advance directives etc.)     
   
12/18/2019 11:30am 
SW discussed and reviewed with patient his current advance medical directive on file. SW informed patient that his document is not valid/legal due to incompletion. Patient has his self-listed as his secondary agent and there are no witness signatures. Patient stated that he and his spouse Carlton Massey) completed this form while in the hospital.  At that time, his spouse reported that she did not feel comfortable with making the final decision. He and his spouse have not had any further discussion about his wishes or her capability to make decision on his behalf. SW educated patient on the importance of having this document in place, and offered to have an AMD specialist contact him for further assistance with completing a new document. Patient reported that he is not interested in discussing or completing a new document at this time, however he was receptive to having SW mail him AMD education material to review. SW will mail patient a copy of Your Right to Decide packet and follow up with him at a later date to confirm receipt of information and to offer assistance if needed. [address] [place] [address] Team  12/13/2019  2:30pm 
Patient has an advance medical directive on file however, it is incomplete. Patient's wishes are unclear and their are no witness signatures on the document. Patient has a signed DDNR on file and his spouse, Carlton Massey is listed as his healthcare decision maker. SW will follow up with patient within 3-5 days to review, discuss AMD and offer assistance to complete a new form. [address] [place] [address] Team

## 2019-12-18 NOTE — ACP (ADVANCE CARE PLANNING)
Non-Provider Advance Care Planning (ACP) Note Date of ACP Conversation: 12/18/2019 Persons included in Conversation: Patient Length of ACP Conversation in minutes: 15min Conversation requested by: 
  Authorized Decision Maker (if patient is incapable of making informed decisions): This person is: 
Spouse Chelle Calderon) General ACP for ALL Patients with Decision Making Capacity: 
 
Review of Existing Advance Directive: (Select questions covered) Patient has his spouse Chelle Calderon) list as his primary agent and himself listed a secondary agent. Patient reported that when he and his wife completed the form, she stated that she did not feel as though she could make the final decision. Since completing the form, they have not discussed this topic any further. SW informed patient that his document is not valid and encourage to create a new one. Interventions Provided: 
Provided ACP educational materials: Your Right to [employer] Recommended completion of Advance Directive after review of materials, conversation with prospective healthcare agent about (and others as needed), and readiness to complete a written plan Referral to Provider for additional medical information/discussion Reviewed patient's existing Advance Directive and prior conversations/process related to its completion Provided Advance Directive Form for patient to review/complete independently, per patient request 
Recommended communicating the plan and providing copies for the healthcare agent, personal physician, and others as appropriate.

## 2019-12-18 NOTE — PROGRESS NOTES
Care Transitions Nurse Note: 
Goals Chronic Disease  Identification of barriers to adherence to a plan of care such as inability to afford medications, lack of insurance, lack of transportation, etc.   
  12/18/19- clarified with Mr. Omar Jansen that he may not be able to use the card for the free month's supply for Eliquis- he has already filled a prescription through insurance. He also cannot use more than one voucher in a lifetime. Asked him to check now if his pharmacy can fill his Eliquis and use the voucher before he is out of his Eliquis. He may need to reach out to cardiology office for samples. Will work with SW to screen for eligibility for medication assistance through [employer]. [facility]  
 
12/18/2019  11:30am 
SW contacted patient to inquire about assistance needed. Patient reported that he is in the doughnut hole and can not afford to pay for some of his medication co-pays, which includes, an inhaler, Eloquis, Entresto and Lantus. He does not qualify for medicaid at this time. · (ANORO ELLIPTA) 62.5-25 mcg/actuation inhaler- cost for medication $108.00 · Eloquis- He has 2 free coupons for medication that will last him throughout his doughnut hole phase. · Gillian Gayle- He is currently receiving patient medication assistance through the Advance HF clinic for this med. · Lantus- recently prescribed med cost $508.00, [address] has a generic brand that is affordable. He contacted his PCP to make them aware of the cost of medication and to see if they can prescribe him the generic brand that he can afford. DONOVAN will mail patient an [employer] medication assistance form to review/ complete for possible participation in their program.  Aziza Mulligan will contact at a later date and offer assistance if needed. [address] Care Transitions Team  12/13/2019 2:30pm 
It was reported that patient is in need of assistance with paying for his medications. SW called and left a detail message requesting a call back to discuss needs. SW will attempt contact at a later date if call is not returned. [address] Care Transitions Team  11/22/19-   Review of EMR - some notes about adherence with medications and about affording medications- reached out to MSW in advance HF clinic to see if she has insight to share and/or if she can assist.   
He shared with me today- the 30 days supply of Eliquis cost him over $120- relayed this to EP provider's nurse- he is most likely in donut hole and if he needs to stay on Eliquis past the initial 30 days - office will need to help out with samples until after January 1st- relayed this information to the vendor to talk with cardiac cath lab-EP Lab at [facility] to make sure they are aware. Reached out to advance HF clinic- they are helping him with renewal for PA for Entresto. He does have Part D. She relays that his income is above guidelines for medicaid assistance- Medication Assistance with medicare. Sent referral ask our MSW to help with screening and look for PA options for his other medications. Reached out to [facility] about notes about previous FA application. Will determine next steps if available. LLC  Prepare patients and caregivers for end of life decisions (ie. need for hospice, pain management, symptom relief, advance directives etc.)   
  12/18/19- spoke with Mr. Nathan Umana- he states he feels that he is not depressed, but does feel that he is spending a lot of his time going to and seeing doctors and getting tests done. States that he is dealing well with \"all that is going on\". Asked him to reach out to me or someone if he feels overwhelmed and/or needs help; always available to talk. Encouraged him to talk with Dr. Leslie Summers and other healthcare providers as well. There are resources that can help.    [facility]  
 
12/18/2019 11:30am 
 SW discussed and reviewed with patient his current advance medical directive on file. SW informed patient that his document is not valid/legal due to incompletion. Patient has his self-listed as his secondary agent and there are no witness signatures. Patient stated that he and his spouse Marysol Mcmahon) completed this form while in the hospital.  At that time, his spouse reported that she did not feel comfortable with making the final decision. He and his spouse have not had any further discussion about his wishes or her capability to make decision on his behalf. SW educated patient on the importance of having this document in place, and offered to have an AMD specialist contact him for further assistance with completing a new document. Patient reported that he is not interested in discussing or completing a new document at this time, however he was receptive to having SW mail him AMD education material to review. SW will mail patient a copy of Your Right to Decide packet and follow up with him at a later date to confirm receipt of information and to offer assistance if needed. [address] [address] Team  12/13/2019  2:30pm 
Patient has an advance medical directive on file however, it is incomplete. Patient's wishes are unclear and their are no witness signatures on the document. Patient has a signed DDNR on file and his spouse, Marysol Mcmahon is listed as his healthcare decision maker. SW will follow up with patient within 3-5 days to review, discuss AMD and offer assistance to complete a new form. [address] [place] [address] Team  Post Hospitalization  Attends follow-up appointments as directed. 12/18/19- Future Appointments Date Time Provider Shelley Rucker 12/27/2019  1:00 PM STRESS LAB 1 [facility]  
2/24/2020  2:10 PM Naye Massey MD [facility] [address]  
 3/10/2020 10:15 AM Monica Guillermo NP [facility] SABRINA SCHED  
5/11/2020 11:45 AM PACEMAKER, [facility] SABRINA SCHED  
6/3/2020  1:00 PM Kecia Gandhi MD [address]  
8/19/2020  9:00 AM Lobo Fregoso MD [address]  
 
11/22/19-   
PCP- Dr. Justine Ashton  Tuesday, 11/26 at 2:10. Attended-[facility] Cardiology-  
Primary- Dr. Elvira Romberg- has not seen since 9/27/18- will clarify who managing for   primary  - per Dr. Elvira Romberg- he will be. Will coordinate f/u appt with him. Attended on 12/2.  [facility] EP- Dr. Deidre Aquino- office will call to schedule LUÍS procedure in 3 weeks. Scheduled for LUÍS on 12/27 at 1 pm.    
                             Device- dual lead PPM-ICD, remote due on 12/5 at 11:30. Advance HF clinic - Dr. Mariella Claude- attended- on 12/6-llc PAD- Dr. Es Mcdonald- will get vascular test done on 11/29- at 10 am, see Es Mcdonald on                                  12/11 at 11 am - [place] office location. Attended-[facility]  
  
  Knowledge and adherence of prescribed medication (ie. action, side effects, missed dose, etc.).   
  12/18/19- Mr. Lilly Moran states that he is taking all medications as prescribed- clarified that he is to continue the  mg BID dose of Enresto. AVS listed as a different strength. Refer to goal under identification of barriers. [facility]  
 
11/22/19- Mr. Lilly Moran states that his 30 days supply of Eliquis costs over $120- refer to other goals about plan. Also noted that there is documentation about medication adherence- may be due to cost of medications- he does have Part D. Will assess further and reached out to MSW for assistance. LLC  
  
  Understands red flags post discharge. 12/18/19- spoke with Mr. Lilly Moran- he said he is doing \"good\"- still has some NAVARRO with exertion and distances-also affected by PAD -pain. He said his angio procedure site is clear. He is pondering whether or not he will he have the PAD bypass surgery. Reviewed medications- no changes made- clarified that he is to continue the higher dose of Shaniqua Chazlaleonardo  
 
11/22/19- spoke with Dayanara Jesus Tony- he had fair understanding and now better understanding of plan regarding yesterday's cancelled Watchman procedure- explained mechanism of action for anti-coagulants and anti-platelet- he is to take both until next LUÍS can be done- approx 3 weeks.     LLC

## 2019-12-27 NOTE — PROCEDURES
Clinton Tran MD. [facility]              Patient: Tasha Zaragoza. 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 See full LUÍS note. Complications: none    Reviewed images with Dr. Yuliet Wright. Can repeat LUÍS in 6 months to re-image.

## 2019-12-27 NOTE — PROGRESS NOTES
TRANSFER - IN REPORT:    Verbal report received from [facility]) on [address] [facility] [address] [facility]  being received from [facility]) for ordered procedure      Report consisted of patients Situation, Background, Assessment and   Recommendations(SBAR). Information from the following report(s) SBAR was reviewed with the receiving nurse. Opportunity for questions and clarification was provided. Assessment completed upon patients arrival to unit and care assumed.

## 2019-12-27 NOTE — PROGRESS NOTES
TRANSFER - OUT REPORT:    Verbal report given to [facility] RACHEAL) on [address]. being transferred to [facility]) for ordered procedure       Report consisted of patient's Situation, Background, Assessment and   Recommendations(SBAR). Information from the following report(s) SBAR was reviewed with the receiving nurse. Opportunity for questions and clarification was provided.       Patient transported with:   Registered Nurse

## 2019-12-27 NOTE — H&P
Ernie García MD. [facility]              Patient: Khang Head. : 1950      Today's Date: 2019        HISTORY OF PRESENT ILLNESS:     History of Present Illness:  Mr. Emre Blanc is here for a LUÍS today in preparation for a Watchman device. He has been compliant with his Eliquis BID past 30 days. PAST MEDICAL HISTORY:     Past Medical History:   Diagnosis Date    Arthritis     hands/fingers-OSTEO    CAD (coronary artery disease)     involving native coronary artery of native heart without angina pectoris    Cardiomyopathy ([address] 2017    A. Echo (17):  EF 5-10% with severe GHK,. Mildly dil LA. Mild TR. PASP 46./systemic cardiomyopathy    Chronic kidney disease     Chronic obstructive pulmonary disease (HCC)     Chronic renal insufficiency 2017    Chronic systolic congestive heart failure, NYHA class 3 ([address] 2019    Claudication ([address] 10/9/2018    Congestive heart failure ([address]     Diabetes mellitus ([address] 3/6/2017    IDDM    Dyslipidemia 3/6/2017    A. FLP (17): Tot 120, , HDL 19, LDL 76 (no Rx).  H/O: GI bleed 3/6/2017    Hepatitis C 3/6/2017    Hypokalemia     ICD (implantable cardioverter-defibrillator) in place     Ill-defined condition     flu 2018     Neuropathy 2018    Noncompliance with medication regimen 2018    Paroxysmal atrial fibrillation ([address] 3/6/2017    Peripheral vascular disease ([address] 2017    A. RICKY (3/20/17): Right 0.58, Left 0.56.  Seizures ([address]     WITH HEPARIN    Tobacco dependence syndrome 10/9/2018    Vitamin D deficiency 2018         Past Surgical History:   Procedure Laterality Date    CARDIAC SURG PROCEDURE UNLIST      CORONARY STENTX 3 2 CORONARY, & 1 FEMORAL    COLONOSCOPY N/A 2017    COLONOSCOPY performed by Dino Mitchell. Alex Engel MD at [address] COLONOSCOPY N/A 2018    COLONOSCOPY performed by Dino Mitchell.  Alex Engel MD at [facility]. N/A 10/22/2019    COLONOSCOPY performed by Denilson Huang MD at [facility] ENDOSCOPY    HX CORONARY STENT PLACEMENT      LM, RCA x 2     HX ENDOSCOPY  10/22/2019    [facility]     HX GI      colonoscopy x 3 - last 2/2017 - polyp    HX IMPLANTABLE CARDIOVERTER DEFIBRILLATOR      HX OTHER SURGICAL      HX PACEMAKER      AICD    VASCULAR SURGERY PROCEDURE UNLIST  2017    STENT - RIGHT FEMORAL          MEDICATIONS:     No current facility-administered medications on file prior to encounter. Current Outpatient Medications on File Prior to Encounter   Medication Sig Dispense Refill    sacubitril-valsartan (ENTRESTO) 97 mg/103 mg tablet Take 1 Tab by mouth two (2) times a day.  insulin NPH/insulin regular (NOVOLIN 70/30 U-100 INSULIN) 100 unit/mL (70-30) injection 10 Units by SubCUTAneous route Daily (before breakfast).  insulin NPH/insulin regular (NOVOLIN 70/30 U-100 INSULIN) 100 unit/mL (70-30) injection 8 Units by SubCUTAneous route Daily (before dinner).  cholecalciferol (VITAMIN D3) (5000 Units /125 mcg) capsule Take 1 Cap by mouth daily. 30 Cap 2    apixaban (ELIQUIS) 5 mg tablet Take 1 Tab by mouth two (2) times a day. 60 Tab 2    torsemide (DEMADEX) 20 mg tablet Take 0.5 Tabs by mouth as needed (for shortness of breath or edema). FYI dose decrease by JING Mcdaniels NP on 9/18/19 (Patient taking differently: Take 10 mg by mouth daily. FYI dose decrease by JING Mdcaniels, NP on 9/18/19) 180 Tab 1    potassium chloride (K-DUR, KLOR-CON) 20 mEq tablet TAKE 1 TABLET BY MOUTH TWICE DAILY 60 Tab 3    metoprolol succinate (TOPROL-XL) 50 mg XL tablet Take 1 Tab by mouth daily. 90 Tab 3    atorvastatin (LIPITOR) 40 mg tablet TAKE ONE TABLET BY MOUTH ONCE DAILY  Indications: excessive fat in the blood 90 Tab 3    aspirin 81 mg chewable tablet Take 81 mg by mouth daily.       Insulin Syringes, Disposable, 1 mL syrg Pt to take 10 units w/ breakfast and 8 units w/ dinner 500 Syringe 1    pen needle, diabetic (NOVOFINE PLUS) 32 gauge x /6\" ndle 1 Each by Does Not Apply route daily. 100 Pen Needle 1       Allergies   Allergen Reactions    Heparin (Porcine) Other (comments)     Was told when in the hospital that if he received heparin again that it would be deadly. SOCIAL HISTORY:     Social History     Tobacco Use    Smoking status: Current Every Day Smoker     Packs/day: 1.00     Years: 55.00     Pack years: 55.00    Smokeless tobacco: Never Used    Tobacco comment:     Substance Use Topics    Alcohol use: Yes     Comment: 1 BEER WEEKLY    Drug use: Not Currently         FAMILY HISTORY:     Family History   Problem Relation Age of Onset    Hypertension Mother     Heart Disease Mother         MURMUR    Cancer Father         COLON    Colon Cancer Father     Other Sister         born totally handicapped/epileptic    Kidney Disease Sister          from renal shutdown    Heart Disease Brother     Other Brother         ?pancreatic cancer or ?pancreatitis    Cancer Brother         PANCREATIC? AND COLON    Cancer Maternal Uncle         toes and spread    Cancer Paternal Uncle         stomach    Heart Attack Maternal Grandfather 47    Cancer Paternal Grandfather         bone    Cancer Maternal Uncle         stomach    Cancer Maternal Uncle         lung    No Known Problems Son     No Known Problems Son     Anesth Problems Neg Hx                REVIEW OF SYMPTOMS:      Review of Symptoms:  Constitutional: Negative for fever   HEENT: Negative for vision changes. + tinnitus   Respiratory: + chronic cough   Cardiovascular: Negative for syncope   Gastrointestinal: Negative for abdominal pain, melena  Genitourinary: Negative for dysuria  Skin: Negative for rash  Heme: No problems bleeding.   Neuro - no speech changes or focal weaknesses  + claudication            PHYSICAL EXAM:      Physical Exam:  Visit Vitals  /64 (BP 1 Location: Right arm, BP Patient Position: At rest)   Pulse 62   Temp 98 °F (36.7 °C)   Resp 16   Ht 5' 10\" (1.778 m)   Wt 202 lb 6.1 oz (91.8 kg)   SpO2 98%   BMI 29.04 kg/m²       Patient appears generally well, mood and affect are appropriate and pleasant. HEENT:  Hearing intact, non-icteric, normocephalic, atraumatic. Neck Exam: Supple, No JVD  Lung Exam: Clear to auscultation, even breath sounds. Cardiac Exam: Regular rate and rhythm with no murmur  Abdomen: Soft, non-tender. Obese   Extremities: Moves all ext well. No lower extremity edema. Psych: Appropriate affect  Neuro - Grossly intact           LABS / OTHER STUDIES:      Lab Results   Component Value Date/Time    Sodium 136 12/17/2019 04:04 AM    Potassium 4.5 12/17/2019 04:04 AM    Chloride 107 12/17/2019 04:04 AM    CO2 25 12/17/2019 04:04 AM    Anion gap 4 (L) 12/17/2019 04:04 AM    Glucose 219 (H) 12/17/2019 04:04 AM    BUN 45 (H) 12/17/2019 04:04 AM    Creatinine 2.15 (H) 12/17/2019 04:04 AM    BUN/Creatinine ratio 21 (H) 12/17/2019 04:04 AM    GFR est AA 37 (L) 12/17/2019 04:04 AM    GFR est non-AA 31 (L) 12/17/2019 04:04 AM    Calcium 9.1 12/17/2019 04:04 AM    Bilirubin, total 0.5 11/14/2019 03:48 PM    AST (SGOT) 11 (L) 11/14/2019 03:48 PM    Alk. phosphatase 68 11/14/2019 03:48 PM    Protein, total 7.0 11/14/2019 03:48 PM    Albumin 3.7 11/14/2019 03:48 PM    Globulin 3.3 11/14/2019 03:48 PM    A-G Ratio 1.1 11/14/2019 03:48 PM    ALT (SGPT) 22 11/14/2019 03:48 PM     Lab Results   Component Value Date/Time    WBC 7.0 12/17/2019 04:04 AM    HGB 13.4 12/17/2019 04:04 AM    HCT 40.6 12/17/2019 04:04 AM    PLATELET 033 (L) 96/82/6585 04:04 AM    MCV 90.2 12/17/2019 04:04 AM          CARDIAC DIAGNOSTICS:      Cardiac Evaluation Includes:     A.  Echo (1/19/17):  EF 5-10% with severe GHK,.  Mildly dil LA.  Mild TR.  PASP 46. [place] Rodriguez (1/20/17):  LM ost70.  LAD m50.  D1 80.  LCx d70; OM1 99, OM2 90.  RCA p100.  No AVG.  PAP  53/27/36.   C.  PCI (2/9/17): pRCA 2.5x38 Xience + 2.75x12 Xience.  ostLM 4.0x12 Xience post-dil with 4.5x12 NC. Pilo Chu (2/13/17):  EF 10% with severe GHK, PSM.  Dil LA.  Mod MR.  Mild to mod TR.  Left pleural effusion. Stef Suresh (5/15/17): EF 25% with severe GHK and basl-mid inf AK, mildly dil LV, DD.  Sev dil LA.  Mod MR.  Mild TR.  PASP 35. Marsh[employer]na How (6/12/17, Anup): [employer].     Echo 7/11/18 - LV mildly dilated.  LVEF 25%.  Akinesis of the basal-mid inferior wall(s). Grade 1 diastolic dysfunction.  Mild-mod LAE.  Mild MR.  PASP 42.   Sinus of valsalva was 4.3 cm, STJ was 3.4 and ascending aorta was 3.6 cm.      Echo 7/11/19 - LVEF 21-25%, PASP 45 mmHg     LUÍS 9/27/19 - LVEF 20-25%, ANGE measurements taken      RICKY 11/29/19 - Right RICKY 0.39; Left RICKY 0.60           ASSESSMENT AND PLAN:      Assessment and Plan:  PAF  - Watchman device cancelled 11/21/19 --> Eliquis started then with plans for re-attempt after 3-4 weeks ---> Given GI bleeding risk, would like him [facility] [address] [place] [address] [facility]  - will repeat LUÍS today to rule out ANGE clot and then plan for Watchman accordingly         Camryn Zapata MD, [address] [place], [address] [id number] [address]iste.  [address] [place] [address], [address] [place] [address], [address] [place] [address]  Ph: [phone]                                                              [phone]

## 2019-12-27 NOTE — PROGRESS NOTES
1345    Patient arrived. ID and allergies verified verbally with patient. Pt voices understanding of procedure to be performed. Consent obtained. Pt prepped for procedure. 1415    Difficulty registering patient on first floor due to insurance  Patient arrived at [facility] at [phone]    MD at bedside  Will be doing case      3:24 PM    TRANSFER - OUT REPORT:    Verbal report given to WILLIAM Moya on Fernanda Cheema.  being transferred to [facility] for ordered procedure       Report consisted of patients Situation, Background, Assessment and   Recommendations(SBAR). Information from the following report(s) SBAR was reviewed with the receiving nurse. Lines:   Peripheral IV 12/27/19 Right Hand (Active)        Opportunity for questions and clarification was provided. Patient transported with:   Registered Nurse        [address] [place] [address] REPORT:    Verbal report received from [facility] on [facility].  being received from [facility] for routine progression of care      Report consisted of patients Situation, Background, Assessment and   Recommendations(SBAR). Information from the following report(s) Procedure Summary was reviewed with the receiving nurse. Opportunity for questions and clarification was provided. Assessment completed upon patients arrival to unit and care assumed. 4:16 PM      Patient awake and oriented to 4  Denies pain  Family updated    4:34 PM    Gag reflex present    Discharge instructions reviewed with patient and family. Voiced understanding. Patient given copy of discharge instructions to take home. [id number]    Patient dangled at bedside  Patient ambulated without assist  No complaints    Ice chips tolerated w/o incident    5:22 PM    Pt discharged via wheelchair with Luann Lorenzo. RN. Personal belongings with patient upon discharge.

## 2019-12-27 NOTE — TELEPHONE ENCOUNTER
Spoke with Abelardo Rice from hospital scheduling. Patient LUÍS has not been approved with ICM diagnosis. Patient currently awaiting new diagnosis code for LUÍS to be done. CHF, i50.22 given per VO of Dr. Rakesh Paiz. Re-ordered with proper ICD code.

## 2019-12-27 NOTE — DISCHARGE INSTRUCTIONS
AFTER YOU TRANSESOPHAGEAL ECHOCARDIOGRAM    Be sure someone else drives you home. You may feel drowsy for several hours. Do not eat or drink for at least two hours after your procedure. Your throat will be numb and there is a risk you might have difficulty swallowing for a while. Be careful when you do eat or drink for the first time especially with hot fluids since you could easily burn your throat. Call 911 if:    · You are bleeding from your throat or mouth. · You have trouble breathing all of a sudden. · You have chest pain or any pain that spreads to your neck, jaw, or arms. Call Dr. Alem Zarate if:    · You have questions or concerns. · You have a fever greater than 101°F.      Special Instructions:    No driving for 24 hours. You may start eating and drinking at [facility] off with soft foods - ice cream, yogurt, pudding, milkshakes and progress to macaroni and cheese, mashed potatoes, etc.   Do not drink anything hot at first as you may still be numb and burn yourself.

## 2020-01-01 ENCOUNTER — PATIENT OUTREACH (OUTPATIENT)
Dept: INTERNAL MEDICINE CLINIC | Age: 70
End: 2020-01-01

## 2020-01-01 ENCOUNTER — OFFICE VISIT (OUTPATIENT)
Dept: CARDIOLOGY CLINIC | Age: 70
End: 2020-01-01

## 2020-01-01 ENCOUNTER — APPOINTMENT (OUTPATIENT)
Dept: CT IMAGING | Age: 70
DRG: 180 | End: 2020-01-01
Attending: NURSE PRACTITIONER
Payer: MEDICARE

## 2020-01-01 ENCOUNTER — ANESTHESIA (OUTPATIENT)
Dept: CARDIAC CATH/INVASIVE PROCEDURES | Age: 70
DRG: 287 | End: 2020-01-01
Payer: MEDICARE

## 2020-01-01 ENCOUNTER — HOSPITAL ENCOUNTER (INPATIENT)
Age: 70
LOS: 3 days | Discharge: HOSPICE | DRG: 180 | End: 2020-07-25
Attending: EMERGENCY MEDICINE | Admitting: FAMILY MEDICINE
Payer: MEDICARE

## 2020-01-01 ENCOUNTER — OFFICE VISIT (OUTPATIENT)
Dept: FAMILY MEDICINE CLINIC | Age: 70
End: 2020-01-01

## 2020-01-01 ENCOUNTER — HOSPITAL ENCOUNTER (OUTPATIENT)
Dept: NON INVASIVE DIAGNOSTICS | Age: 70
Discharge: HOME | End: 2020-06-15
Attending: NURSE PRACTITIONER
Payer: MEDICARE

## 2020-01-01 ENCOUNTER — ANESTHESIA EVENT (OUTPATIENT)
Dept: CARDIAC CATH/INVASIVE PROCEDURES | Age: 70
DRG: 287 | End: 2020-01-01
Payer: MEDICARE

## 2020-01-01 ENCOUNTER — TELEPHONE (OUTPATIENT)
Dept: CARDIOLOGY CLINIC | Age: 70
End: 2020-01-01

## 2020-01-01 ENCOUNTER — APPOINTMENT (OUTPATIENT)
Dept: GENERAL RADIOLOGY | Age: 70
DRG: 180 | End: 2020-01-01
Payer: MEDICARE

## 2020-01-01 ENCOUNTER — APPOINTMENT (OUTPATIENT)
Dept: NUCLEAR MEDICINE | Age: 70
DRG: 180 | End: 2020-01-01
Attending: INTERNAL MEDICINE
Payer: MEDICARE

## 2020-01-01 ENCOUNTER — APPOINTMENT (OUTPATIENT)
Dept: MRI IMAGING | Age: 70
DRG: 180 | End: 2020-01-01
Attending: STUDENT IN AN ORGANIZED HEALTH CARE EDUCATION/TRAINING PROGRAM
Payer: MEDICARE

## 2020-01-01 ENCOUNTER — HOSPITAL ENCOUNTER (INPATIENT)
Age: 70
LOS: 1 days | Discharge: HOME | DRG: 287 | End: 2020-02-18
Attending: INTERNAL MEDICINE | Admitting: INTERNAL MEDICINE
Payer: MEDICARE

## 2020-01-01 ENCOUNTER — HOSPITAL ENCOUNTER (OUTPATIENT)
Dept: PREADMISSION TESTING | Age: 70
Discharge: HOME | End: 2020-02-14
Payer: MEDICARE

## 2020-01-01 ENCOUNTER — APPOINTMENT (OUTPATIENT)
Dept: CARDIAC CATH/INVASIVE PROCEDURES | Age: 70
DRG: 287 | End: 2020-01-01
Attending: INTERNAL MEDICINE
Payer: MEDICARE

## 2020-01-01 ENCOUNTER — DOCUMENTATION ONLY (OUTPATIENT)
Dept: CARDIOLOGY CLINIC | Age: 70
End: 2020-01-01

## 2020-01-01 ENCOUNTER — APPOINTMENT (OUTPATIENT)
Dept: GENERAL RADIOLOGY | Age: 70
DRG: 180 | End: 2020-01-01
Attending: INTERNAL MEDICINE
Payer: MEDICARE

## 2020-01-01 ENCOUNTER — DOCUMENTATION ONLY (OUTPATIENT)
Dept: FAMILY MEDICINE CLINIC | Age: 70
End: 2020-01-01

## 2020-01-01 ENCOUNTER — APPOINTMENT (OUTPATIENT)
Dept: GENERAL RADIOLOGY | Age: 70
DRG: 180 | End: 2020-01-01
Attending: NURSE PRACTITIONER
Payer: MEDICARE

## 2020-01-01 ENCOUNTER — VIRTUAL VISIT (OUTPATIENT)
Dept: FAMILY MEDICINE CLINIC | Age: 70
End: 2020-01-01

## 2020-01-01 ENCOUNTER — APPOINTMENT (OUTPATIENT)
Dept: ULTRASOUND IMAGING | Age: 70
DRG: 180 | End: 2020-01-01
Attending: FAMILY MEDICINE
Payer: MEDICARE

## 2020-01-01 VITALS
WEIGHT: 205 LBS | OXYGEN SATURATION: 96 % | TEMPERATURE: 98.4 F | HEIGHT: 70 IN | BODY MASS INDEX: 29.35 KG/M2 | SYSTOLIC BLOOD PRESSURE: 130 MMHG | HEART RATE: 75 BPM | DIASTOLIC BLOOD PRESSURE: 61 MMHG | RESPIRATION RATE: 20 BRPM

## 2020-01-01 VITALS
WEIGHT: 199 LBS | HEART RATE: 66 BPM | OXYGEN SATURATION: 92 % | BODY MASS INDEX: 28.49 KG/M2 | RESPIRATION RATE: 16 BRPM | TEMPERATURE: 97.7 F | SYSTOLIC BLOOD PRESSURE: 101 MMHG | DIASTOLIC BLOOD PRESSURE: 45 MMHG | HEIGHT: 70 IN

## 2020-01-01 VITALS
BODY MASS INDEX: 28.75 KG/M2 | DIASTOLIC BLOOD PRESSURE: 82 MMHG | SYSTOLIC BLOOD PRESSURE: 132 MMHG | HEIGHT: 70 IN | TEMPERATURE: 97.6 F | WEIGHT: 200.8 LBS | HEART RATE: 72 BPM | OXYGEN SATURATION: 97 % | RESPIRATION RATE: 16 BRPM

## 2020-01-01 VITALS
SYSTOLIC BLOOD PRESSURE: 142 MMHG | TEMPERATURE: 99.1 F | RESPIRATION RATE: 20 BRPM | BODY MASS INDEX: 27.37 KG/M2 | WEIGHT: 191.2 LBS | OXYGEN SATURATION: 97 % | DIASTOLIC BLOOD PRESSURE: 72 MMHG | HEART RATE: 97 BPM | HEIGHT: 70 IN

## 2020-01-01 VITALS
HEIGHT: 70 IN | TEMPERATURE: 97.5 F | WEIGHT: 204 LBS | SYSTOLIC BLOOD PRESSURE: 150 MMHG | BODY MASS INDEX: 29.2 KG/M2 | HEART RATE: 76 BPM | DIASTOLIC BLOOD PRESSURE: 75 MMHG

## 2020-01-01 VITALS
WEIGHT: 189.4 LBS | SYSTOLIC BLOOD PRESSURE: 136 MMHG | BODY MASS INDEX: 27.18 KG/M2 | DIASTOLIC BLOOD PRESSURE: 76 MMHG | TEMPERATURE: 98.4 F | OXYGEN SATURATION: 96 % | RESPIRATION RATE: 16 BRPM | HEART RATE: 101 BPM

## 2020-01-01 VITALS
WEIGHT: 191.14 LBS | HEIGHT: 70 IN | SYSTOLIC BLOOD PRESSURE: 142 MMHG | BODY MASS INDEX: 27.36 KG/M2 | DIASTOLIC BLOOD PRESSURE: 72 MMHG

## 2020-01-01 VITALS
HEIGHT: 70 IN | BODY MASS INDEX: 25.91 KG/M2 | TEMPERATURE: 97.9 F | OXYGEN SATURATION: 92 % | DIASTOLIC BLOOD PRESSURE: 73 MMHG | WEIGHT: 181 LBS | RESPIRATION RATE: 16 BRPM | SYSTOLIC BLOOD PRESSURE: 115 MMHG | HEART RATE: 96 BPM

## 2020-01-01 DIAGNOSIS — E55.9 VITAMIN D DEFICIENCY: ICD-10-CM

## 2020-01-01 DIAGNOSIS — N18.30 CHRONIC RENAL IMPAIRMENT, STAGE 3 (MODERATE) (HCC): ICD-10-CM

## 2020-01-01 DIAGNOSIS — Z95.810 ICD (IMPLANTABLE CARDIOVERTER-DEFIBRILLATOR) IN PLACE: ICD-10-CM

## 2020-01-01 DIAGNOSIS — Z71.6 TOBACCO ABUSE COUNSELING: ICD-10-CM

## 2020-01-01 DIAGNOSIS — E87.1 HYPONATREMIA: ICD-10-CM

## 2020-01-01 DIAGNOSIS — E78.5 DYSLIPIDEMIA: ICD-10-CM

## 2020-01-01 DIAGNOSIS — I50.20 SYSTOLIC HEART FAILURE, ACC/AHA STAGE C (HCC): ICD-10-CM

## 2020-01-01 DIAGNOSIS — R52 PAIN: ICD-10-CM

## 2020-01-01 DIAGNOSIS — Z95.810 CARDIAC DEFIBRILLATOR IN SITU: Primary | ICD-10-CM

## 2020-01-01 DIAGNOSIS — I73.9 CLAUDICATION (HCC): ICD-10-CM

## 2020-01-01 DIAGNOSIS — Z79.4 TYPE 2 DIABETES MELLITUS WITH DIABETIC NEUROPATHY, WITH LONG-TERM CURRENT USE OF INSULIN (HCC): ICD-10-CM

## 2020-01-01 DIAGNOSIS — R06.09 DOE (DYSPNEA ON EXERTION): ICD-10-CM

## 2020-01-01 DIAGNOSIS — I48.0 PAROXYSMAL ATRIAL FIBRILLATION (HCC): ICD-10-CM

## 2020-01-01 DIAGNOSIS — F17.210 CONTINUOUS DEPENDENCE ON CIGARETTE SMOKING: ICD-10-CM

## 2020-01-01 DIAGNOSIS — E11.8 TYPE 2 DIABETES MELLITUS WITH COMPLICATION (HCC): Primary | ICD-10-CM

## 2020-01-01 DIAGNOSIS — I73.9 PAD (PERIPHERAL ARTERY DISEASE) (HCC): ICD-10-CM

## 2020-01-01 DIAGNOSIS — I73.9 PERIPHERAL VASCULAR DISEASE (HCC): ICD-10-CM

## 2020-01-01 DIAGNOSIS — E11.40 TYPE 2 DIABETES MELLITUS WITH DIABETIC NEUROPATHY, WITH LONG-TERM CURRENT USE OF INSULIN (HCC): ICD-10-CM

## 2020-01-01 DIAGNOSIS — I73.9 PERIPHERAL VASCULAR DISEASE (HCC): Primary | ICD-10-CM

## 2020-01-01 DIAGNOSIS — R79.89 ELEVATED BRAIN NATRIURETIC PEPTIDE (BNP) LEVEL: ICD-10-CM

## 2020-01-01 DIAGNOSIS — Z71.89 ADVANCED CARE PLANNING/COUNSELING DISCUSSION: ICD-10-CM

## 2020-01-01 DIAGNOSIS — C34.92 SMALL CELL CARCINOMA OF LEFT LUNG (HCC): ICD-10-CM

## 2020-01-01 DIAGNOSIS — I25.5 ISCHEMIC CARDIOMYOPATHY: ICD-10-CM

## 2020-01-01 DIAGNOSIS — N18.30 CKD (CHRONIC KIDNEY DISEASE) STAGE 3, GFR 30-59 ML/MIN (HCC): ICD-10-CM

## 2020-01-01 DIAGNOSIS — B19.20 HEPATITIS C VIRUS INFECTION WITHOUT HEPATIC COMA, UNSPECIFIED CHRONICITY: ICD-10-CM

## 2020-01-01 DIAGNOSIS — R06.02 SOB (SHORTNESS OF BREATH): ICD-10-CM

## 2020-01-01 DIAGNOSIS — I25.5 ISCHEMIC CARDIOMYOPATHY: Primary | ICD-10-CM

## 2020-01-01 DIAGNOSIS — F17.200 TOBACCO DEPENDENCE SYNDROME: ICD-10-CM

## 2020-01-01 DIAGNOSIS — I50.9 STAGE C CHRONIC COMBINED CONGESTIVE HEART FAILURE (HCC): ICD-10-CM

## 2020-01-01 DIAGNOSIS — R06.02 SHORTNESS OF BREATH: ICD-10-CM

## 2020-01-01 DIAGNOSIS — R06.02 SHORTNESS OF BREATH: Primary | ICD-10-CM

## 2020-01-01 DIAGNOSIS — Z71.89 GOALS OF CARE, COUNSELING/DISCUSSION: ICD-10-CM

## 2020-01-01 DIAGNOSIS — I50.22 NYHA CLASS 2 AND ACA/AHA STAGE C CHRONIC SYSTOLIC CONGESTIVE HEART FAILURE: ICD-10-CM

## 2020-01-01 DIAGNOSIS — I50.20 SYSTOLIC HEART FAILURE, ACC/AHA STAGE C (HCC): Primary | ICD-10-CM

## 2020-01-01 DIAGNOSIS — G62.9 NEUROPATHY: ICD-10-CM

## 2020-01-01 DIAGNOSIS — I50.22 NYHA CLASS 2 AND ACC/AHA STAGE C CHRONIC SYSTOLIC CONGESTIVE HEART FAILURE (HCC): ICD-10-CM

## 2020-01-01 DIAGNOSIS — Z91.14 NONCOMPLIANCE WITH MEDICATION REGIMEN: ICD-10-CM

## 2020-01-01 DIAGNOSIS — Z87.19 H/O: GI BLEED: ICD-10-CM

## 2020-01-01 DIAGNOSIS — J90 PLEURAL EFFUSION: Primary | ICD-10-CM

## 2020-01-01 DIAGNOSIS — R63.0 POOR APPETITE: ICD-10-CM

## 2020-01-01 DIAGNOSIS — D49.1 NEOPLASM OF LUNG: ICD-10-CM

## 2020-01-01 DIAGNOSIS — Z71.89 DNR (DO NOT RESUSCITATE) DISCUSSION: ICD-10-CM

## 2020-01-01 DIAGNOSIS — Z71.1 CONCERN ABOUT END OF LIFE: ICD-10-CM

## 2020-01-01 DIAGNOSIS — C78.7 METASTASIS TO LIVER (HCC): ICD-10-CM

## 2020-01-01 DIAGNOSIS — J44.1 CHRONIC OBSTRUCTIVE PULMONARY DISEASE WITH ACUTE EXACERBATION (HCC): ICD-10-CM

## 2020-01-01 DIAGNOSIS — I50.22 CHRONIC SYSTOLIC CONGESTIVE HEART FAILURE, NYHA CLASS 3 (HCC): ICD-10-CM

## 2020-01-01 DIAGNOSIS — C78.7 LIVER METASTASES (HCC): ICD-10-CM

## 2020-01-01 LAB
25(OH)D3+25(OH)D2 SERPL-MCNC: 26.9 NG/ML (ref 30–100)
25(OH)D3+25(OH)D2 SERPL-MCNC: 34.9 NG/ML (ref 30–100)
ABO + RH BLD: NORMAL
ALBUMIN SERPL-MCNC: 3.2 G/DL (ref 3.5–5)
ALBUMIN SERPL-MCNC: 3.6 G/DL (ref 3.5–5)
ALBUMIN SERPL-MCNC: 4.2 G/DL (ref 3.8–4.8)
ALBUMIN/GLOB SERPL: 0.7 {RATIO} (ref 1.1–2.2)
ALBUMIN/GLOB SERPL: 1.3 {RATIO} (ref 1.1–2.2)
ALBUMIN/GLOB SERPL: 1.7 {RATIO} (ref 1.2–2.2)
ALP SERPL-CCNC: 141 U/L (ref 45–117)
ALP SERPL-CCNC: 57 U/L (ref 45–117)
ALP SERPL-CCNC: 71 IU/L (ref 39–117)
ALT SERPL-CCNC: 13 U/L (ref 12–78)
ALT SERPL-CCNC: 31 U/L (ref 12–78)
ALT SERPL-CCNC: 8 IU/L (ref 0–44)
ANION GAP SERPL CALC-SCNC: 10 MMOL/L (ref 5–15)
ANION GAP SERPL CALC-SCNC: 11 MMOL/L (ref 5–15)
ANION GAP SERPL CALC-SCNC: 6 MMOL/L (ref 5–15)
ANION GAP SERPL CALC-SCNC: 8 MMOL/L (ref 5–15)
ANION GAP SERPL CALC-SCNC: 8 MMOL/L (ref 5–15)
ANION GAP SERPL CALC-SCNC: 9 MMOL/L (ref 5–15)
ANION GAP SERPL CALC-SCNC: 9 MMOL/L (ref 5–15)
APPEARANCE FLD: ABNORMAL
APPEARANCE UR: CLEAR
APPEARANCE UR: CLEAR
AST SERPL-CCNC: 13 IU/L (ref 0–40)
AST SERPL-CCNC: 52 U/L (ref 15–37)
AST SERPL-CCNC: 9 U/L (ref 15–37)
ATRIAL RATE: 111 BPM
AV VELOCITY RATIO: 0.79
BACTERIA SPEC CULT: NORMAL
BACTERIA SPEC CULT: NORMAL
BACTERIA URNS QL MICRO: NEGATIVE /HPF
BACTERIA URNS QL MICRO: NEGATIVE /HPF
BASOPHILS # BLD: 0 K/UL (ref 0–0.1)
BASOPHILS # BLD: 0.1 K/UL (ref 0–0.1)
BASOPHILS NFR BLD: 1 % (ref 0–1)
BILIRUB SERPL-MCNC: 0.5 MG/DL (ref 0.2–1)
BILIRUB SERPL-MCNC: 0.5 MG/DL (ref 0.2–1)
BILIRUB SERPL-MCNC: 0.5 MG/DL (ref 0–1.2)
BILIRUB UR QL CFM: NEGATIVE
BILIRUB UR QL: NEGATIVE
BLOOD GROUP ANTIBODIES SERPL: NORMAL
BNP SERPL-MCNC: ABNORMAL PG/ML
BODY FLD TYPE: NORMAL
BUN SERPL-MCNC: 14 MG/DL (ref 8–27)
BUN SERPL-MCNC: 20 MG/DL (ref 6–20)
BUN SERPL-MCNC: 20 MG/DL (ref 6–20)
BUN SERPL-MCNC: 23 MG/DL (ref 6–20)
BUN SERPL-MCNC: 24 MG/DL (ref 6–20)
BUN SERPL-MCNC: 24 MG/DL (ref 6–20)
BUN SERPL-MCNC: 27 MG/DL (ref 6–20)
BUN SERPL-MCNC: 33 MG/DL (ref 6–20)
BUN SERPL-MCNC: 37 MG/DL (ref 8–27)
BUN/CREAT SERPL: 10 (ref 10–24)
BUN/CREAT SERPL: 11 (ref 12–20)
BUN/CREAT SERPL: 13 (ref 12–20)
BUN/CREAT SERPL: 14 (ref 12–20)
BUN/CREAT SERPL: 15 (ref 12–20)
BUN/CREAT SERPL: 20 (ref 10–24)
BUN/CREAT SERPL: 8 (ref 12–20)
CALCIUM SERPL-MCNC: 8.4 MG/DL (ref 8.5–10.1)
CALCIUM SERPL-MCNC: 8.6 MG/DL (ref 8.5–10.1)
CALCIUM SERPL-MCNC: 8.6 MG/DL (ref 8.5–10.1)
CALCIUM SERPL-MCNC: 8.9 MG/DL (ref 8.5–10.1)
CALCIUM SERPL-MCNC: 9 MG/DL (ref 8.5–10.1)
CALCIUM SERPL-MCNC: 9 MG/DL (ref 8.5–10.1)
CALCIUM SERPL-MCNC: 9.1 MG/DL (ref 8.5–10.1)
CALCIUM SERPL-MCNC: 9.1 MG/DL (ref 8.6–10.2)
CALCIUM SERPL-MCNC: 9.4 MG/DL (ref 8.6–10.2)
CALCULATED P AXIS, ECG09: 60 DEGREES
CALCULATED R AXIS, ECG10: 40 DEGREES
CALCULATED T AXIS, ECG11: 49 DEGREES
CHLORIDE SERPL-SCNC: 100 MMOL/L (ref 96–106)
CHLORIDE SERPL-SCNC: 105 MMOL/L (ref 97–108)
CHLORIDE SERPL-SCNC: 86 MMOL/L (ref 97–108)
CHLORIDE SERPL-SCNC: 89 MMOL/L (ref 97–108)
CHLORIDE SERPL-SCNC: 90 MMOL/L (ref 97–108)
CHLORIDE SERPL-SCNC: 90 MMOL/L (ref 97–108)
CHLORIDE SERPL-SCNC: 92 MMOL/L (ref 97–108)
CHLORIDE SERPL-SCNC: 92 MMOL/L (ref 97–108)
CHLORIDE SERPL-SCNC: 97 MMOL/L (ref 96–106)
CHOLEST SERPL-MCNC: 216 MG/DL (ref 100–199)
CO2 SERPL-SCNC: 20 MMOL/L (ref 20–29)
CO2 SERPL-SCNC: 22 MMOL/L (ref 20–29)
CO2 SERPL-SCNC: 23 MMOL/L (ref 21–32)
CO2 SERPL-SCNC: 24 MMOL/L (ref 21–32)
CO2 SERPL-SCNC: 24 MMOL/L (ref 21–32)
CO2 SERPL-SCNC: 25 MMOL/L (ref 21–32)
CO2 SERPL-SCNC: 25 MMOL/L (ref 21–32)
CO2 SERPL-SCNC: 26 MMOL/L (ref 21–32)
CO2 SERPL-SCNC: 28 MMOL/L (ref 21–32)
COLOR FLD: YELLOW
COLOR UR: ABNORMAL
COLOR UR: ABNORMAL
COMMENT, HOLDF: NORMAL
CREAT SERPL-MCNC: 1.35 MG/DL (ref 0.7–1.3)
CREAT SERPL-MCNC: 1.4 MG/DL (ref 0.76–1.27)
CREAT SERPL-MCNC: 1.45 MG/DL (ref 0.7–1.3)
CREAT SERPL-MCNC: 1.59 MG/DL (ref 0.7–1.3)
CREAT SERPL-MCNC: 1.65 MG/DL (ref 0.7–1.3)
CREAT SERPL-MCNC: 1.76 MG/DL (ref 0.7–1.3)
CREAT SERPL-MCNC: 1.84 MG/DL (ref 0.76–1.27)
CREAT SERPL-MCNC: 2.45 MG/DL (ref 0.7–1.3)
CREAT SERPL-MCNC: 4.17 MG/DL (ref 0.7–1.3)
CREAT UR-MCNC: 143 MG/DL
CREAT UR-MCNC: 201 MG/DL
DIAGNOSIS, 93000: NORMAL
DIFFERENTIAL METHOD BLD: ABNORMAL
ECHO AV PEAK GRADIENT: 4.2 MMHG
ECHO AV PEAK VELOCITY: 102.78 CM/S
ECHO LA AREA 4C: 21 CM2
ECHO LA VOL 4C: 65.78 ML (ref 18–58)
ECHO LA VOLUME INDEX A4C: 32.13 ML/M2 (ref 16–28)
ECHO LV INTERNAL DIMENSION DIASTOLIC: 5.12 CM (ref 4.2–5.9)
ECHO LV INTERNAL DIMENSION SYSTOLIC: 4.82 CM
ECHO LV IVSD: 1.15 CM (ref 0.6–1)
ECHO LV MASS 2D: 268.5 G (ref 88–224)
ECHO LV MASS INDEX 2D: 131.1 G/M2 (ref 49–115)
ECHO LV POSTERIOR WALL DIASTOLIC: 1.14 CM (ref 0.6–1)
ECHO LVOT PEAK GRADIENT: 2.6 MMHG
ECHO LVOT PEAK VELOCITY: 81.08 CM/S
ECHO RV INTERNAL DIMENSION: 3.24 CM
ECHO RV TAPSE: 2.27 CM (ref 1.5–2)
ECHO TV REGURGITANT MAX VELOCITY: 319.74 CM/S
ECHO TV REGURGITANT PEAK GRADIENT: 40.9 MMHG
EOSINOPHIL # BLD: 0.1 K/UL (ref 0–0.4)
EOSINOPHIL NFR BLD: 1 % (ref 0–7)
EOSINOPHIL NFR BLD: 2 % (ref 0–7)
EPITH CASTS URNS QL MICRO: ABNORMAL /LPF
EPITH CASTS URNS QL MICRO: ABNORMAL /LPF
ERYTHROCYTE [DISTWIDTH] IN BLOOD BY AUTOMATED COUNT: 12.7 % (ref 11.5–14.5)
ERYTHROCYTE [DISTWIDTH] IN BLOOD BY AUTOMATED COUNT: 13 % (ref 11.6–15.4)
ERYTHROCYTE [DISTWIDTH] IN BLOOD BY AUTOMATED COUNT: 14.4 % (ref 11.5–14.5)
ERYTHROCYTE [DISTWIDTH] IN BLOOD BY AUTOMATED COUNT: 14.6 % (ref 11.5–14.5)
ERYTHROCYTE [DISTWIDTH] IN BLOOD BY AUTOMATED COUNT: 14.8 % (ref 11.5–14.5)
ERYTHROCYTE [DISTWIDTH] IN BLOOD BY AUTOMATED COUNT: 15.1 % (ref 11.5–14.5)
EST. AVERAGE GLUCOSE BLD GHB EST-MCNC: 280 MG/DL
EST. AVERAGE GLUCOSE BLD GHB EST-MCNC: 315 MG/DL
GLOBULIN SER CALC-MCNC: 2.5 G/DL (ref 1.5–4.5)
GLOBULIN SER CALC-MCNC: 2.8 G/DL (ref 2–4)
GLOBULIN SER CALC-MCNC: 4.6 G/DL (ref 2–4)
GLUCOSE BLD STRIP.AUTO-MCNC: 103 MG/DL (ref 65–100)
GLUCOSE BLD STRIP.AUTO-MCNC: 111 MG/DL (ref 65–100)
GLUCOSE BLD STRIP.AUTO-MCNC: 111 MG/DL (ref 65–100)
GLUCOSE BLD STRIP.AUTO-MCNC: 112 MG/DL (ref 65–100)
GLUCOSE BLD STRIP.AUTO-MCNC: 124 MG/DL (ref 65–100)
GLUCOSE BLD STRIP.AUTO-MCNC: 132 MG/DL (ref 65–100)
GLUCOSE BLD STRIP.AUTO-MCNC: 134 MG/DL (ref 65–100)
GLUCOSE BLD STRIP.AUTO-MCNC: 147 MG/DL (ref 65–100)
GLUCOSE BLD STRIP.AUTO-MCNC: 149 MG/DL (ref 65–100)
GLUCOSE BLD STRIP.AUTO-MCNC: 167 MG/DL (ref 65–100)
GLUCOSE BLD STRIP.AUTO-MCNC: 426 MG/DL (ref 65–100)
GLUCOSE BLD STRIP.AUTO-MCNC: 431 MG/DL (ref 65–100)
GLUCOSE BLD STRIP.AUTO-MCNC: 74 MG/DL (ref 65–100)
GLUCOSE BLD STRIP.AUTO-MCNC: 80 MG/DL (ref 65–100)
GLUCOSE BLD STRIP.AUTO-MCNC: 83 MG/DL (ref 65–100)
GLUCOSE BLD STRIP.AUTO-MCNC: 87 MG/DL (ref 65–100)
GLUCOSE FLD-MCNC: 123 MG/DL
GLUCOSE SERPL-MCNC: 105 MG/DL (ref 65–100)
GLUCOSE SERPL-MCNC: 135 MG/DL (ref 65–100)
GLUCOSE SERPL-MCNC: 158 MG/DL (ref 65–100)
GLUCOSE SERPL-MCNC: 219 MG/DL (ref 65–99)
GLUCOSE SERPL-MCNC: 312 MG/DL (ref 65–100)
GLUCOSE SERPL-MCNC: 393 MG/DL (ref 65–99)
GLUCOSE SERPL-MCNC: 72 MG/DL (ref 65–100)
GLUCOSE SERPL-MCNC: 73 MG/DL (ref 65–100)
GLUCOSE SERPL-MCNC: 99 MG/DL (ref 65–100)
GLUCOSE UR STRIP.AUTO-MCNC: >1000 MG/DL
GLUCOSE UR STRIP.AUTO-MCNC: NEGATIVE MG/DL
GRAN CASTS URNS QL MICRO: ABNORMAL /LPF
HBA1C MFR BLD: 11.4 % (ref 4–5.6)
HBA1C MFR BLD: 12.6 % (ref 4.8–5.6)
HCT VFR BLD AUTO: 40.2 % (ref 36.6–50.3)
HCT VFR BLD AUTO: 40.3 % (ref 36.6–50.3)
HCT VFR BLD AUTO: 40.4 % (ref 36.6–50.3)
HCT VFR BLD AUTO: 40.6 % (ref 36.6–50.3)
HCT VFR BLD AUTO: 41.9 % (ref 37.5–51)
HCT VFR BLD AUTO: 46.6 % (ref 36.6–50.3)
HDLC SERPL-MCNC: 30 MG/DL
HGB BLD-MCNC: 13.1 G/DL (ref 12.1–17)
HGB BLD-MCNC: 13.5 G/DL (ref 12.1–17)
HGB BLD-MCNC: 13.6 G/DL (ref 12.1–17)
HGB BLD-MCNC: 13.7 G/DL (ref 12.1–17)
HGB BLD-MCNC: 14.3 G/DL (ref 13–17.7)
HGB BLD-MCNC: 16 G/DL (ref 12.1–17)
HGB UR QL STRIP: ABNORMAL
HGB UR QL STRIP: NEGATIVE
HYALINE CASTS URNS QL MICRO: ABNORMAL /LPF (ref 0–5)
IMM GRANULOCYTES # BLD AUTO: 0 K/UL (ref 0–0.04)
IMM GRANULOCYTES # BLD AUTO: 0.1 K/UL (ref 0–0.04)
IMM GRANULOCYTES NFR BLD AUTO: 0 % (ref 0–0.5)
IMM GRANULOCYTES NFR BLD AUTO: 1 % (ref 0–0.5)
KETONES UR QL STRIP.AUTO: NEGATIVE MG/DL
KETONES UR QL STRIP.AUTO: NEGATIVE MG/DL
LACTATE SERPL-SCNC: 1.8 MMOL/L (ref 0.4–2)
LDH FLD L TO P-CCNC: 610 U/L
LDLC SERPL CALC-MCNC: 147 MG/DL (ref 0–99)
LEUKOCYTE ESTERASE UR QL STRIP.AUTO: NEGATIVE
LEUKOCYTE ESTERASE UR QL STRIP.AUTO: NEGATIVE
LIPASE SERPL-CCNC: 201 U/L (ref 73–393)
LVFS 2D: 5.82 %
LYMPHOCYTES # BLD: 0.4 K/UL (ref 0.8–3.5)
LYMPHOCYTES # BLD: 0.5 K/UL (ref 0.8–3.5)
LYMPHOCYTES # BLD: 0.5 K/UL (ref 0.8–3.5)
LYMPHOCYTES # BLD: 0.6 K/UL (ref 0.8–3.5)
LYMPHOCYTES # BLD: 0.8 K/UL (ref 0.8–3.5)
LYMPHOCYTES NFR BLD: 13 % (ref 12–49)
LYMPHOCYTES NFR BLD: 5 % (ref 12–49)
LYMPHOCYTES NFR BLD: 5 % (ref 12–49)
LYMPHOCYTES NFR BLD: 7 % (ref 12–49)
LYMPHOCYTES NFR BLD: 8 % (ref 12–49)
LYMPHOCYTES NFR FLD: 50 %
MAGNESIUM SERPL-MCNC: 1.8 MG/DL (ref 1.6–2.3)
MAGNESIUM SERPL-MCNC: 1.8 MG/DL (ref 1.6–2.3)
MCH RBC QN AUTO: 27 PG (ref 26–34)
MCH RBC QN AUTO: 27.2 PG (ref 26–34)
MCH RBC QN AUTO: 27.3 PG (ref 26–34)
MCH RBC QN AUTO: 27.3 PG (ref 26–34)
MCH RBC QN AUTO: 28.2 PG (ref 26.6–33)
MCH RBC QN AUTO: 28.9 PG (ref 26–34)
MCHC RBC AUTO-ENTMCNC: 32.6 G/DL (ref 30–36.5)
MCHC RBC AUTO-ENTMCNC: 33.5 G/DL (ref 30–36.5)
MCHC RBC AUTO-ENTMCNC: 33.7 G/DL (ref 30–36.5)
MCHC RBC AUTO-ENTMCNC: 33.7 G/DL (ref 30–36.5)
MCHC RBC AUTO-ENTMCNC: 34.1 G/DL (ref 31.5–35.7)
MCHC RBC AUTO-ENTMCNC: 34.3 G/DL (ref 30–36.5)
MCV RBC AUTO: 79.5 FL (ref 80–99)
MCV RBC AUTO: 80.6 FL (ref 80–99)
MCV RBC AUTO: 80.8 FL (ref 80–99)
MCV RBC AUTO: 80.9 FL (ref 80–99)
MCV RBC AUTO: 83 FL (ref 79–97)
MCV RBC AUTO: 88.5 FL (ref 80–99)
MESOTHL CELL NFR FLD: 30 %
MONOCYTES # BLD: 0.5 K/UL (ref 0–1)
MONOCYTES # BLD: 0.6 K/UL (ref 0–1)
MONOCYTES # BLD: 0.8 K/UL (ref 0–1)
MONOCYTES # BLD: 0.8 K/UL (ref 0–1)
MONOCYTES # BLD: 1.1 K/UL (ref 0–1)
MONOCYTES NFR BLD: 10 % (ref 5–13)
MONOCYTES NFR BLD: 10 % (ref 5–13)
MONOCYTES NFR BLD: 11 % (ref 5–13)
MONOCYTES NFR BLD: 8 % (ref 5–13)
MONOCYTES NFR BLD: 8 % (ref 5–13)
MONOS+MACROS NFR FLD: 12 %
NEUTROPHILS NFR FLD: 8 %
NEUTS SEG # BLD: 4.6 K/UL (ref 1.8–8)
NEUTS SEG # BLD: 6.1 K/UL (ref 1.8–8)
NEUTS SEG # BLD: 6.2 K/UL (ref 1.8–8)
NEUTS SEG # BLD: 6.6 K/UL (ref 1.8–8)
NEUTS SEG # BLD: 8.7 K/UL (ref 1.8–8)
NEUTS SEG NFR BLD: 76 % (ref 32–75)
NEUTS SEG NFR BLD: 79 % (ref 32–75)
NEUTS SEG NFR BLD: 82 % (ref 32–75)
NEUTS SEG NFR BLD: 83 % (ref 32–75)
NEUTS SEG NFR BLD: 84 % (ref 32–75)
NITRITE UR QL STRIP.AUTO: NEGATIVE
NITRITE UR QL STRIP.AUTO: NEGATIVE
NRBC # BLD: 0 K/UL (ref 0–0.01)
NRBC BLD-RTO: 0 PER 100 WBC
NT-PROBNP SERPL-MCNC: 3183 PG/ML (ref 0–376)
NT-PROBNP SERPL-MCNC: 608 PG/ML (ref 0–376)
NUC CELL # FLD: 217 /CU MM
OSMOLALITY SERPL: 270 MOSM/KG H2O
OSMOLALITY UR: 607 MOSM/KG H2O
P-R INTERVAL, ECG05: 168 MS
PH FLD: 7.2 [PH]
PH UR STRIP: 5.5 [PH] (ref 5–8)
PH UR STRIP: 5.5 [PH] (ref 5–8)
PLATELET # BLD AUTO: 123 K/UL (ref 150–400)
PLATELET # BLD AUTO: 177 X10E3/UL (ref 150–450)
PLATELET # BLD AUTO: 189 K/UL (ref 150–400)
PLATELET # BLD AUTO: 202 K/UL (ref 150–400)
PLATELET # BLD AUTO: 210 K/UL (ref 150–400)
PLATELET # BLD AUTO: 227 K/UL (ref 150–400)
PMV BLD AUTO: 10.6 FL (ref 8.9–12.9)
PMV BLD AUTO: 9 FL (ref 8.9–12.9)
PMV BLD AUTO: 9.4 FL (ref 8.9–12.9)
PMV BLD AUTO: 9.5 FL (ref 8.9–12.9)
PMV BLD AUTO: 9.6 FL (ref 8.9–12.9)
POTASSIUM SERPL-SCNC: 3.8 MMOL/L (ref 3.5–5.1)
POTASSIUM SERPL-SCNC: 3.8 MMOL/L (ref 3.5–5.2)
POTASSIUM SERPL-SCNC: 3.9 MMOL/L (ref 3.5–5.1)
POTASSIUM SERPL-SCNC: 3.9 MMOL/L (ref 3.5–5.1)
POTASSIUM SERPL-SCNC: 4 MMOL/L (ref 3.5–5.1)
POTASSIUM SERPL-SCNC: 4.2 MMOL/L (ref 3.5–5.2)
POTASSIUM SERPL-SCNC: 4.3 MMOL/L (ref 3.5–5.1)
POTASSIUM SERPL-SCNC: 4.3 MMOL/L (ref 3.5–5.1)
POTASSIUM SERPL-SCNC: 4.9 MMOL/L (ref 3.5–5.1)
PROCALCITONIN SERPL-MCNC: 0.95 NG/ML
PROT FLD-MCNC: 2.6 G/DL
PROT SERPL-MCNC: 6.4 G/DL (ref 6.4–8.2)
PROT SERPL-MCNC: 6.7 G/DL (ref 6–8.5)
PROT SERPL-MCNC: 7.8 G/DL (ref 6.4–8.2)
PROT UR STRIP-MCNC: 30 MG/DL
PROT UR STRIP-MCNC: >300 MG/DL
PROT UR-MCNC: 226 MG/DL (ref 0–11.9)
Q-T INTERVAL, ECG07: 368 MS
QRS DURATION, ECG06: 114 MS
QTC CALCULATION (BEZET), ECG08: 500 MS
RBC # BLD AUTO: 4.54 M/UL (ref 4.1–5.7)
RBC # BLD AUTO: 5 M/UL (ref 4.1–5.7)
RBC # BLD AUTO: 5 M/UL (ref 4.1–5.7)
RBC # BLD AUTO: 5.02 M/UL (ref 4.1–5.7)
RBC # BLD AUTO: 5.07 X10E6/UL (ref 4.14–5.8)
RBC # BLD AUTO: 5.86 M/UL (ref 4.1–5.7)
RBC # FLD: >100 /CU MM
RBC #/AREA URNS HPF: ABNORMAL /HPF (ref 0–5)
RBC #/AREA URNS HPF: ABNORMAL /HPF (ref 0–5)
RBC MORPH BLD: ABNORMAL
RBC MORPH BLD: ABNORMAL
SAMPLES BEING HELD,HOLD: NORMAL
SERVICE CMNT-IMP: ABNORMAL
SERVICE CMNT-IMP: NORMAL
SODIUM SERPL-SCNC: 121 MMOL/L (ref 136–145)
SODIUM SERPL-SCNC: 122 MMOL/L (ref 136–145)
SODIUM SERPL-SCNC: 124 MMOL/L (ref 136–145)
SODIUM SERPL-SCNC: 124 MMOL/L (ref 136–145)
SODIUM SERPL-SCNC: 125 MMOL/L (ref 136–145)
SODIUM SERPL-SCNC: 125 MMOL/L (ref 136–145)
SODIUM SERPL-SCNC: 137 MMOL/L (ref 134–144)
SODIUM SERPL-SCNC: 138 MMOL/L (ref 134–144)
SODIUM SERPL-SCNC: 139 MMOL/L (ref 136–145)
SODIUM UR-SCNC: 10 MMOL/L
SP GR UR REFRACTOMETRY: 1.02 (ref 1–1.03)
SP GR UR REFRACTOMETRY: 1.02 (ref 1–1.03)
SPECIMEN EXP DATE BLD: NORMAL
SPECIMEN SOURCE FLD: ABNORMAL
SPECIMEN SOURCE FLD: NORMAL
T3FREE SERPL-MCNC: 2.1 PG/ML (ref 2–4.4)
T4 FREE SERPL-MCNC: 1.46 NG/DL (ref 0.82–1.77)
TRIGL SERPL-MCNC: 194 MG/DL (ref 0–149)
TROPONIN I SERPL-MCNC: <0.05 NG/ML
TSH SERPL DL<=0.005 MIU/L-ACNC: 1.25 UIU/ML (ref 0.45–4.5)
UA: UC IF INDICATED,UAUC: ABNORMAL
UR CULT HOLD, URHOLD: NORMAL
UROBILINOGEN UR QL STRIP.AUTO: 0.2 EU/DL (ref 0.2–1)
UROBILINOGEN UR QL STRIP.AUTO: 0.2 EU/DL (ref 0.2–1)
VENTRICULAR RATE, ECG03: 111 BPM
VLDLC SERPL CALC-MCNC: 39 MG/DL (ref 5–40)
WBC # BLD AUTO: 10.5 K/UL (ref 4.1–11.1)
WBC # BLD AUTO: 6.1 K/UL (ref 4.1–11.1)
WBC # BLD AUTO: 7.2 X10E3/UL (ref 3.4–10.8)
WBC # BLD AUTO: 7.6 K/UL (ref 4.1–11.1)
WBC # BLD AUTO: 7.7 K/UL (ref 4.1–11.1)
WBC # BLD AUTO: 7.9 K/UL (ref 4.1–11.1)
WBC URNS QL MICRO: ABNORMAL /HPF (ref 0–4)
WBC URNS QL MICRO: ABNORMAL /HPF (ref 0–4)

## 2020-01-01 PROCEDURE — 82570 ASSAY OF URINE CREATININE: CPT

## 2020-01-01 PROCEDURE — 82962 GLUCOSE BLOOD TEST: CPT

## 2020-01-01 PROCEDURE — 65660000000 HC RM CCU STEPDOWN

## 2020-01-01 PROCEDURE — 94640 AIRWAY INHALATION TREATMENT: CPT

## 2020-01-01 PROCEDURE — 74011636320 HC RX REV CODE- 636/320: Performed by: INTERNAL MEDICINE

## 2020-01-01 PROCEDURE — 85025 COMPLETE CBC W/AUTO DIFF WBC: CPT

## 2020-01-01 PROCEDURE — 77030013797 HC KT TRNSDUC PRSSR EDWD -A: Performed by: INTERNAL MEDICINE

## 2020-01-01 PROCEDURE — 74011250637 HC RX REV CODE- 250/637: Performed by: NURSE PRACTITIONER

## 2020-01-01 PROCEDURE — 99152 MOD SED SAME PHYS/QHP 5/>YRS: CPT

## 2020-01-01 PROCEDURE — 94761 N-INVAS EAR/PLS OXIMETRY MLT: CPT

## 2020-01-01 PROCEDURE — 74011000258 HC RX REV CODE- 258: Performed by: INTERNAL MEDICINE

## 2020-01-01 PROCEDURE — 83930 ASSAY OF BLOOD OSMOLALITY: CPT

## 2020-01-01 PROCEDURE — 74011250636 HC RX REV CODE- 250/636: Performed by: STUDENT IN AN ORGANIZED HEALTH CARE EDUCATION/TRAINING PROGRAM

## 2020-01-01 PROCEDURE — 74011250636 HC RX REV CODE- 250/636: Performed by: INTERNAL MEDICINE

## 2020-01-01 PROCEDURE — 74011000250 HC RX REV CODE- 250: Performed by: STUDENT IN AN ORGANIZED HEALTH CARE EDUCATION/TRAINING PROGRAM

## 2020-01-01 PROCEDURE — 88341 IMHCHEM/IMCYTCHM EA ADD ANTB: CPT

## 2020-01-01 PROCEDURE — 74011250636 HC RX REV CODE- 250/636: Performed by: NURSE ANESTHETIST, CERTIFIED REGISTERED

## 2020-01-01 PROCEDURE — 89050 BODY FLUID CELL COUNT: CPT

## 2020-01-01 PROCEDURE — 76942 ECHO GUIDE FOR BIOPSY: CPT

## 2020-01-01 PROCEDURE — 80048 BASIC METABOLIC PNL TOTAL CA: CPT

## 2020-01-01 PROCEDURE — 83036 HEMOGLOBIN GLYCOSYLATED A1C: CPT

## 2020-01-01 PROCEDURE — 87015 SPECIMEN INFECT AGNT CONCNTJ: CPT

## 2020-01-01 PROCEDURE — 77030014115

## 2020-01-01 PROCEDURE — C1729 CATH, DRAINAGE: HCPCS

## 2020-01-01 PROCEDURE — 94760 N-INVAS EAR/PLS OXIMETRY 1: CPT

## 2020-01-01 PROCEDURE — 87205 SMEAR GRAM STAIN: CPT

## 2020-01-01 PROCEDURE — 74011000250 HC RX REV CODE- 250: Performed by: INTERNAL MEDICINE

## 2020-01-01 PROCEDURE — 76060000034 HC ANESTHESIA 1.5 TO 2 HR: Performed by: INTERNAL MEDICINE

## 2020-01-01 PROCEDURE — 32554 ASPIRATE PLEURA W/O IMAGING: CPT

## 2020-01-01 PROCEDURE — 97535 SELF CARE MNGMENT TRAINING: CPT

## 2020-01-01 PROCEDURE — 74011250637 HC RX REV CODE- 250/637: Performed by: INTERNAL MEDICINE

## 2020-01-01 PROCEDURE — 83935 ASSAY OF URINE OSMOLALITY: CPT

## 2020-01-01 PROCEDURE — 94664 DEMO&/EVAL PT USE INHALER: CPT

## 2020-01-01 PROCEDURE — 74011636320 HC RX REV CODE- 636/320: Performed by: RADIOLOGY

## 2020-01-01 PROCEDURE — 77010033678 HC OXYGEN DAILY

## 2020-01-01 PROCEDURE — 81001 URINALYSIS AUTO W/SCOPE: CPT

## 2020-01-01 PROCEDURE — 74011250637 HC RX REV CODE- 250/637: Performed by: STUDENT IN AN ORGANIZED HEALTH CARE EDUCATION/TRAINING PROGRAM

## 2020-01-01 PROCEDURE — 84157 ASSAY OF PROTEIN OTHER: CPT

## 2020-01-01 PROCEDURE — 86900 BLOOD TYPING SEROLOGIC ABO: CPT

## 2020-01-01 PROCEDURE — C1894 INTRO/SHEATH, NON-LASER: HCPCS | Performed by: INTERNAL MEDICINE

## 2020-01-01 PROCEDURE — 74011000250 HC RX REV CODE- 250: Performed by: NURSE ANESTHETIST, CERTIFIED REGISTERED

## 2020-01-01 PROCEDURE — 83605 ASSAY OF LACTIC ACID: CPT

## 2020-01-01 PROCEDURE — 36415 COLL VENOUS BLD VENIPUNCTURE: CPT

## 2020-01-01 PROCEDURE — 88333 PATH CONSLTJ SURG CYTO XM 1: CPT

## 2020-01-01 PROCEDURE — 88112 CYTOPATH CELL ENHANCE TECH: CPT

## 2020-01-01 PROCEDURE — 83615 LACTATE (LD) (LDH) ENZYME: CPT

## 2020-01-01 PROCEDURE — 97161 PT EVAL LOW COMPLEX 20 MIN: CPT

## 2020-01-01 PROCEDURE — 77030008684 HC TU ET CUF COVD -B: Performed by: ANESTHESIOLOGY

## 2020-01-01 PROCEDURE — 77030020506 HC NDL TRNSPTL NRG BAYL -F: Performed by: INTERNAL MEDICINE

## 2020-01-01 PROCEDURE — 99285 EMERGENCY DEPT VISIT HI MDM: CPT

## 2020-01-01 PROCEDURE — 88307 TISSUE EXAM BY PATHOLOGIST: CPT

## 2020-01-01 PROCEDURE — 33340 PERQ CLSR TCAT L ATR APNDGE: CPT | Performed by: INTERNAL MEDICINE

## 2020-01-01 PROCEDURE — 77030039046 HC PAD DEFIB RADIOTRNSPNT CNMD -B: Performed by: INTERNAL MEDICINE

## 2020-01-01 PROCEDURE — 0FB03ZX EXCISION OF LIVER, PERCUTANEOUS APPROACH, DIAGNOSTIC: ICD-10-PCS | Performed by: RADIOLOGY

## 2020-01-01 PROCEDURE — 83986 ASSAY PH BODY FLUID NOS: CPT

## 2020-01-01 PROCEDURE — 74177 CT ABD & PELVIS W/CONTRAST: CPT

## 2020-01-01 PROCEDURE — 77030026438 HC STYL ET INTUB CARD -A: Performed by: ANESTHESIOLOGY

## 2020-01-01 PROCEDURE — 97165 OT EVAL LOW COMPLEX 30 MIN: CPT

## 2020-01-01 PROCEDURE — 80053 COMPREHEN METABOLIC PANEL: CPT

## 2020-01-01 PROCEDURE — 83880 ASSAY OF NATRIURETIC PEPTIDE: CPT

## 2020-01-01 PROCEDURE — 88342 IMHCHEM/IMCYTCHM 1ST ANTB: CPT

## 2020-01-01 PROCEDURE — 93005 ELECTROCARDIOGRAM TRACING: CPT

## 2020-01-01 PROCEDURE — 71045 X-RAY EXAM CHEST 1 VIEW: CPT

## 2020-01-01 PROCEDURE — 84484 ASSAY OF TROPONIN QUANT: CPT

## 2020-01-01 PROCEDURE — A9503 TC99M MEDRONATE: HCPCS

## 2020-01-01 PROCEDURE — 84145 PROCALCITONIN (PCT): CPT

## 2020-01-01 PROCEDURE — 77030013140 HC MSK NEB VYRM -A

## 2020-01-01 PROCEDURE — 83690 ASSAY OF LIPASE: CPT

## 2020-01-01 PROCEDURE — 74011636637 HC RX REV CODE- 636/637: Performed by: STUDENT IN AN ORGANIZED HEALTH CARE EDUCATION/TRAINING PROGRAM

## 2020-01-01 PROCEDURE — 84300 ASSAY OF URINE SODIUM: CPT

## 2020-01-01 PROCEDURE — 93312 ECHO TRANSESOPHAGEAL: CPT

## 2020-01-01 PROCEDURE — 65270000029 HC RM PRIVATE

## 2020-01-01 PROCEDURE — 74011000250 HC RX REV CODE- 250: Performed by: RADIOLOGY

## 2020-01-01 PROCEDURE — C1760 CLOSURE DEV, VASC: HCPCS | Performed by: INTERNAL MEDICINE

## 2020-01-01 PROCEDURE — 84156 ASSAY OF PROTEIN URINE: CPT

## 2020-01-01 PROCEDURE — 74011250636 HC RX REV CODE- 250/636: Performed by: NURSE PRACTITIONER

## 2020-01-01 PROCEDURE — 77030003503 HC NDL BIOP TISS BD -B

## 2020-01-01 PROCEDURE — 93306 TTE W/DOPPLER COMPLETE: CPT

## 2020-01-01 PROCEDURE — 77030005402 HC CATH RAD ART LN KT TELE -B: Performed by: INTERNAL MEDICINE

## 2020-01-01 PROCEDURE — 0W993ZZ DRAINAGE OF RIGHT PLEURAL CAVITY, PERCUTANEOUS APPROACH: ICD-10-PCS | Performed by: INTERNAL MEDICINE

## 2020-01-01 PROCEDURE — 77030004532 HC CATH ANGI DX IMP BSC -A: Performed by: INTERNAL MEDICINE

## 2020-01-01 PROCEDURE — C1769 GUIDE WIRE: HCPCS | Performed by: INTERNAL MEDICINE

## 2020-01-01 PROCEDURE — 97116 GAIT TRAINING THERAPY: CPT

## 2020-01-01 PROCEDURE — 88305 TISSUE EXAM BY PATHOLOGIST: CPT

## 2020-01-01 PROCEDURE — 87040 BLOOD CULTURE FOR BACTERIA: CPT

## 2020-01-01 PROCEDURE — B2151ZZ FLUOROSCOPY OF LEFT HEART USING LOW OSMOLAR CONTRAST: ICD-10-PCS | Performed by: INTERNAL MEDICINE

## 2020-01-01 PROCEDURE — 97530 THERAPEUTIC ACTIVITIES: CPT

## 2020-01-01 PROCEDURE — 82945 GLUCOSE OTHER FLUID: CPT

## 2020-01-01 PROCEDURE — 74011250636 HC RX REV CODE- 250/636: Performed by: FAMILY MEDICINE

## 2020-01-01 PROCEDURE — 74011636637 HC RX REV CODE- 636/637: Performed by: INTERNAL MEDICINE

## 2020-01-01 PROCEDURE — 71275 CT ANGIOGRAPHY CHEST: CPT

## 2020-01-01 RX ORDER — LIDOCAINE HYDROCHLORIDE 10 MG/ML
0.1 INJECTION, SOLUTION EPIDURAL; INFILTRATION; INTRACAUDAL; PERINEURAL AS NEEDED
Status: CANCELLED | OUTPATIENT
Start: 2020-01-01

## 2020-01-01 RX ORDER — ERGOCALCIFEROL 1.25 MG/1
50000 CAPSULE ORAL
Status: DISCONTINUED | OUTPATIENT
Start: 2020-01-01 | End: 2020-01-01 | Stop reason: HOSPADM

## 2020-01-01 RX ORDER — INSULIN LISPRO 100 [IU]/ML
INJECTION, SOLUTION INTRAVENOUS; SUBCUTANEOUS EVERY 6 HOURS
Status: DISCONTINUED | OUTPATIENT
Start: 2020-01-01 | End: 2020-01-01 | Stop reason: HOSPADM

## 2020-01-01 RX ORDER — INSULIN GLARGINE 100 [IU]/ML
0.4 INJECTION, SOLUTION SUBCUTANEOUS
Status: DISCONTINUED | OUTPATIENT
Start: 2020-01-01 | End: 2020-01-01

## 2020-01-01 RX ORDER — HYDROMORPHONE HYDROCHLORIDE 1 MG/ML
0.5 INJECTION, SOLUTION INTRAMUSCULAR; INTRAVENOUS; SUBCUTANEOUS
Status: CANCELLED | OUTPATIENT
Start: 2020-01-01

## 2020-01-01 RX ORDER — SODIUM CHLORIDE 0.9 % (FLUSH) 0.9 %
5-40 SYRINGE (ML) INJECTION EVERY 8 HOURS
Status: DISCONTINUED | OUTPATIENT
Start: 2020-01-01 | End: 2020-01-01 | Stop reason: HOSPADM

## 2020-01-01 RX ORDER — FENTANYL 25 UG/1
1 PATCH TRANSDERMAL
Status: DISCONTINUED | OUTPATIENT
Start: 2020-01-01 | End: 2020-01-01

## 2020-01-01 RX ORDER — MIDAZOLAM HYDROCHLORIDE 1 MG/ML
.5-5 INJECTION, SOLUTION INTRAMUSCULAR; INTRAVENOUS
Status: DISCONTINUED | OUTPATIENT
Start: 2020-01-01 | End: 2020-01-01

## 2020-01-01 RX ORDER — ACETAMINOPHEN 325 MG/1
650 TABLET ORAL ONCE
Status: CANCELLED | OUTPATIENT
Start: 2020-01-01 | End: 2020-01-01

## 2020-01-01 RX ORDER — BUPROPION HYDROCHLORIDE 150 MG/1
150 TABLET, EXTENDED RELEASE ORAL 2 TIMES DAILY
Qty: 60 TAB | Refills: 5 | Status: SHIPPED | OUTPATIENT
Start: 2020-01-01

## 2020-01-01 RX ORDER — IPRATROPIUM BROMIDE 0.5 MG/2.5ML
0.5 SOLUTION RESPIRATORY (INHALATION)
Status: DISCONTINUED | OUTPATIENT
Start: 2020-01-01 | End: 2020-01-01

## 2020-01-01 RX ORDER — PROMETHAZINE HYDROCHLORIDE 25 MG/1
25 TABLET ORAL
Status: DISCONTINUED | OUTPATIENT
Start: 2020-01-01 | End: 2020-01-01 | Stop reason: HOSPADM

## 2020-01-01 RX ORDER — GLYCOPYRROLATE 0.2 MG/ML
INJECTION INTRAMUSCULAR; INTRAVENOUS AS NEEDED
Status: DISCONTINUED | OUTPATIENT
Start: 2020-01-01 | End: 2020-01-01 | Stop reason: HOSPADM

## 2020-01-01 RX ORDER — TORSEMIDE 20 MG/1
10 TABLET ORAL DAILY
Status: DISCONTINUED | OUTPATIENT
Start: 2020-01-01 | End: 2020-01-01

## 2020-01-01 RX ORDER — TORSEMIDE 20 MG/1
10 TABLET ORAL AS NEEDED
Status: DISCONTINUED | OUTPATIENT
Start: 2020-01-01 | End: 2020-01-01 | Stop reason: HOSPADM

## 2020-01-01 RX ORDER — LIDOCAINE HYDROCHLORIDE 20 MG/ML
INJECTION, SOLUTION EPIDURAL; INFILTRATION; INTRACAUDAL; PERINEURAL AS NEEDED
Status: DISCONTINUED | OUTPATIENT
Start: 2020-01-01 | End: 2020-01-01 | Stop reason: HOSPADM

## 2020-01-01 RX ORDER — MORPHINE SULFATE 10 MG/ML
2 INJECTION, SOLUTION INTRAMUSCULAR; INTRAVENOUS
Status: CANCELLED | OUTPATIENT
Start: 2020-01-01

## 2020-01-01 RX ORDER — BUDESONIDE 0.5 MG/2ML
500 INHALANT ORAL
Status: DISCONTINUED | OUTPATIENT
Start: 2020-01-01 | End: 2020-01-01

## 2020-01-01 RX ORDER — ATORVASTATIN CALCIUM 20 MG/1
40 TABLET, FILM COATED ORAL DAILY
Status: DISCONTINUED | OUTPATIENT
Start: 2020-01-01 | End: 2020-01-01 | Stop reason: HOSPADM

## 2020-01-01 RX ORDER — METOPROLOL SUCCINATE 50 MG/1
25 TABLET, EXTENDED RELEASE ORAL DAILY
Qty: 90 TAB | Refills: 3 | Status: SHIPPED | OUTPATIENT
Start: 2020-01-01 | End: 2020-01-01

## 2020-01-01 RX ORDER — MORPHINE SULFATE 15 MG/1
15 TABLET ORAL EVERY 6 HOURS
Status: DISCONTINUED | OUTPATIENT
Start: 2020-01-01 | End: 2020-01-01 | Stop reason: HOSPADM

## 2020-01-01 RX ORDER — TOLVAPTAN 15 MG/1
15 TABLET ORAL ONCE
Status: COMPLETED | OUTPATIENT
Start: 2020-01-01 | End: 2020-01-01

## 2020-01-01 RX ORDER — NEOSTIGMINE METHYLSULFATE 1 MG/ML
INJECTION INTRAVENOUS AS NEEDED
Status: DISCONTINUED | OUTPATIENT
Start: 2020-01-01 | End: 2020-01-01 | Stop reason: HOSPADM

## 2020-01-01 RX ORDER — DIPHENHYDRAMINE HYDROCHLORIDE 50 MG/ML
12.5 INJECTION, SOLUTION INTRAMUSCULAR; INTRAVENOUS AS NEEDED
Status: CANCELLED | OUTPATIENT
Start: 2020-01-01 | End: 2020-01-01

## 2020-01-01 RX ORDER — LEVOFLOXACIN 5 MG/ML
500 INJECTION, SOLUTION INTRAVENOUS ONCE
Status: COMPLETED | OUTPATIENT
Start: 2020-01-01 | End: 2020-01-01

## 2020-01-01 RX ORDER — ONDANSETRON 2 MG/ML
INJECTION INTRAMUSCULAR; INTRAVENOUS AS NEEDED
Status: DISCONTINUED | OUTPATIENT
Start: 2020-01-01 | End: 2020-01-01 | Stop reason: HOSPADM

## 2020-01-01 RX ORDER — MAGNESIUM SULFATE 100 %
4 CRYSTALS MISCELLANEOUS AS NEEDED
Status: DISCONTINUED | OUTPATIENT
Start: 2020-01-01 | End: 2020-01-01 | Stop reason: HOSPADM

## 2020-01-01 RX ORDER — CEFAZOLIN SODIUM 1 G/3ML
INJECTION, POWDER, FOR SOLUTION INTRAMUSCULAR; INTRAVENOUS AS NEEDED
Status: DISCONTINUED | OUTPATIENT
Start: 2020-01-01 | End: 2020-01-01 | Stop reason: HOSPADM

## 2020-01-01 RX ORDER — ERGOCALCIFEROL 1.25 MG/1
50000 CAPSULE ORAL
Qty: 12 CAP | Refills: 0 | Status: SHIPPED | OUTPATIENT
Start: 2020-01-01 | End: 2020-01-01

## 2020-01-01 RX ORDER — IBUPROFEN 200 MG
1 TABLET ORAL DAILY
Status: DISCONTINUED | OUTPATIENT
Start: 2020-01-01 | End: 2020-01-01 | Stop reason: HOSPADM

## 2020-01-01 RX ORDER — MIDAZOLAM HYDROCHLORIDE 1 MG/ML
1 INJECTION, SOLUTION INTRAMUSCULAR; INTRAVENOUS AS NEEDED
Status: CANCELLED | OUTPATIENT
Start: 2020-01-01

## 2020-01-01 RX ORDER — LIDOCAINE HYDROCHLORIDE 10 MG/ML
10 INJECTION, SOLUTION EPIDURAL; INFILTRATION; INTRACAUDAL; PERINEURAL
Status: COMPLETED | OUTPATIENT
Start: 2020-01-01 | End: 2020-01-01

## 2020-01-01 RX ORDER — MORPHINE SULFATE 2 MG/ML
4 INJECTION, SOLUTION INTRAMUSCULAR; INTRAVENOUS ONCE
Status: COMPLETED | OUTPATIENT
Start: 2020-01-01 | End: 2020-01-01

## 2020-01-01 RX ORDER — SODIUM CHLORIDE 9 MG/ML
INJECTION, SOLUTION INTRAVENOUS
Status: DISCONTINUED | OUTPATIENT
Start: 2020-01-01 | End: 2020-01-01 | Stop reason: HOSPADM

## 2020-01-01 RX ORDER — DEXTROSE 50 % IN WATER (D50W) INTRAVENOUS SYRINGE
25-50 AS NEEDED
Status: DISCONTINUED | OUTPATIENT
Start: 2020-01-01 | End: 2020-01-01 | Stop reason: HOSPADM

## 2020-01-01 RX ORDER — IPRATROPIUM BROMIDE 0.5 MG/2.5ML
0.5 SOLUTION RESPIRATORY (INHALATION)
Status: DISCONTINUED | OUTPATIENT
Start: 2020-01-01 | End: 2020-01-01 | Stop reason: HOSPADM

## 2020-01-01 RX ORDER — SODIUM CHLORIDE 0.9 % (FLUSH) 0.9 %
5-40 SYRINGE (ML) INJECTION EVERY 8 HOURS
Status: CANCELLED | OUTPATIENT
Start: 2020-01-01

## 2020-01-01 RX ORDER — FENTANYL CITRATE 50 UG/ML
50 INJECTION, SOLUTION INTRAMUSCULAR; INTRAVENOUS AS NEEDED
Status: CANCELLED | OUTPATIENT
Start: 2020-01-01

## 2020-01-01 RX ORDER — SODIUM CHLORIDE, SODIUM LACTATE, POTASSIUM CHLORIDE, CALCIUM CHLORIDE 600; 310; 30; 20 MG/100ML; MG/100ML; MG/100ML; MG/100ML
100 INJECTION, SOLUTION INTRAVENOUS CONTINUOUS
Status: CANCELLED | OUTPATIENT
Start: 2020-01-01

## 2020-01-01 RX ORDER — TORSEMIDE 20 MG/1
20 TABLET ORAL DAILY
Status: DISCONTINUED | OUTPATIENT
Start: 2020-01-01 | End: 2020-01-01

## 2020-01-01 RX ORDER — MIDAZOLAM HYDROCHLORIDE 1 MG/ML
0.5 INJECTION, SOLUTION INTRAMUSCULAR; INTRAVENOUS
Status: CANCELLED | OUTPATIENT
Start: 2020-01-01

## 2020-01-01 RX ORDER — POLYETHYLENE GLYCOL 3350 17 G/17G
17 POWDER, FOR SOLUTION ORAL
Qty: 15 PACKET | Refills: 0 | Status: SHIPPED | OUTPATIENT
Start: 2020-01-01 | End: 2020-08-08

## 2020-01-01 RX ORDER — SODIUM CHLORIDE 0.9 % (FLUSH) 0.9 %
5-40 SYRINGE (ML) INJECTION AS NEEDED
Status: DISCONTINUED | OUTPATIENT
Start: 2020-01-01 | End: 2020-01-01 | Stop reason: HOSPADM

## 2020-01-01 RX ORDER — MORPHINE SULFATE 2 MG/ML
2 INJECTION, SOLUTION INTRAMUSCULAR; INTRAVENOUS
Status: DISCONTINUED | OUTPATIENT
Start: 2020-01-01 | End: 2020-01-01

## 2020-01-01 RX ORDER — POTASSIUM CHLORIDE 20 MEQ/1
TABLET, EXTENDED RELEASE ORAL
Qty: 60 TAB | Refills: 3 | Status: SHIPPED | OUTPATIENT
Start: 2020-01-01 | End: 2020-01-01

## 2020-01-01 RX ORDER — SODIUM CHLORIDE 0.9 % (FLUSH) 0.9 %
5-40 SYRINGE (ML) INJECTION AS NEEDED
Status: CANCELLED | OUTPATIENT
Start: 2020-01-01

## 2020-01-01 RX ORDER — SODIUM CHLORIDE 9 MG/ML
25 INJECTION, SOLUTION INTRAVENOUS CONTINUOUS
Status: CANCELLED | OUTPATIENT
Start: 2020-01-01 | End: 2020-01-01

## 2020-01-01 RX ORDER — IPRATROPIUM BROMIDE AND ALBUTEROL SULFATE 2.5; .5 MG/3ML; MG/3ML
3 SOLUTION RESPIRATORY (INHALATION)
Status: DISCONTINUED | OUTPATIENT
Start: 2020-01-01 | End: 2020-01-01

## 2020-01-01 RX ORDER — TORSEMIDE 20 MG/1
10 TABLET ORAL AS NEEDED
Qty: 180 TAB | Refills: 1 | Status: SHIPPED | OUTPATIENT
Start: 2020-01-01 | End: 2020-01-01

## 2020-01-01 RX ORDER — INSULIN GLARGINE 100 [IU]/ML
0.2 INJECTION, SOLUTION SUBCUTANEOUS
Status: DISCONTINUED | OUTPATIENT
Start: 2020-01-01 | End: 2020-01-01

## 2020-01-01 RX ORDER — POLYETHYLENE GLYCOL 3350 17 G/17G
17 POWDER, FOR SOLUTION ORAL DAILY PRN
Status: DISCONTINUED | OUTPATIENT
Start: 2020-01-01 | End: 2020-01-01 | Stop reason: HOSPADM

## 2020-01-01 RX ORDER — PROPOFOL 10 MG/ML
INJECTION, EMULSION INTRAVENOUS AS NEEDED
Status: DISCONTINUED | OUTPATIENT
Start: 2020-01-01 | End: 2020-01-01 | Stop reason: HOSPADM

## 2020-01-01 RX ORDER — ARFORMOTEROL TARTRATE 15 UG/2ML
15 SOLUTION RESPIRATORY (INHALATION)
Status: DISCONTINUED | OUTPATIENT
Start: 2020-01-01 | End: 2020-01-01

## 2020-01-01 RX ORDER — MORPHINE SULFATE 2 MG/ML
2 INJECTION, SOLUTION INTRAMUSCULAR; INTRAVENOUS
Status: DISCONTINUED | OUTPATIENT
Start: 2020-01-01 | End: 2020-01-01 | Stop reason: HOSPADM

## 2020-01-01 RX ORDER — ROPIVACAINE HYDROCHLORIDE 5 MG/ML
30 INJECTION, SOLUTION EPIDURAL; INFILTRATION; PERINEURAL AS NEEDED
Status: CANCELLED | OUTPATIENT
Start: 2020-01-01

## 2020-01-01 RX ORDER — SODIUM CHLORIDE, SODIUM LACTATE, POTASSIUM CHLORIDE, CALCIUM CHLORIDE 600; 310; 30; 20 MG/100ML; MG/100ML; MG/100ML; MG/100ML
100 INJECTION, SOLUTION INTRAVENOUS CONTINUOUS
Status: CANCELLED | OUTPATIENT
Start: 2020-01-01 | End: 2020-01-01

## 2020-01-01 RX ORDER — LEVOFLOXACIN 5 MG/ML
250 INJECTION, SOLUTION INTRAVENOUS EVERY 24 HOURS
Status: DISCONTINUED | OUTPATIENT
Start: 2020-01-01 | End: 2020-01-01 | Stop reason: HOSPADM

## 2020-01-01 RX ORDER — FENTANYL CITRATE 50 UG/ML
25-100 INJECTION, SOLUTION INTRAMUSCULAR; INTRAVENOUS
Status: DISCONTINUED | OUTPATIENT
Start: 2020-01-01 | End: 2020-01-01

## 2020-01-01 RX ORDER — SACUBITRIL AND VALSARTAN 49; 51 MG/1; MG/1
1 TABLET, FILM COATED ORAL 2 TIMES DAILY
Qty: 180 TAB | Refills: 0 | Status: SHIPPED | OUTPATIENT
Start: 2020-01-01 | End: 2020-01-01

## 2020-01-01 RX ORDER — CLOPIDOGREL BISULFATE 75 MG/1
TABLET ORAL
Qty: 90 TAB | Refills: 0 | Status: ON HOLD | OUTPATIENT
Start: 2020-01-01 | End: 2020-01-01

## 2020-01-01 RX ORDER — ONDANSETRON 2 MG/ML
4 INJECTION INTRAMUSCULAR; INTRAVENOUS
Status: DISCONTINUED | OUTPATIENT
Start: 2020-01-01 | End: 2020-01-01 | Stop reason: HOSPADM

## 2020-01-01 RX ORDER — HYDROCODONE BITARTRATE AND ACETAMINOPHEN 5; 325 MG/1; MG/1
1 TABLET ORAL
Status: DISCONTINUED | OUTPATIENT
Start: 2020-01-01 | End: 2020-01-01 | Stop reason: HOSPADM

## 2020-01-01 RX ORDER — METOPROLOL SUCCINATE 25 MG/1
25 TABLET, EXTENDED RELEASE ORAL DAILY
Status: DISCONTINUED | OUTPATIENT
Start: 2020-01-01 | End: 2020-01-01 | Stop reason: HOSPADM

## 2020-01-01 RX ORDER — ROCURONIUM BROMIDE 10 MG/ML
INJECTION, SOLUTION INTRAVENOUS AS NEEDED
Status: DISCONTINUED | OUTPATIENT
Start: 2020-01-01 | End: 2020-01-01 | Stop reason: HOSPADM

## 2020-01-01 RX ORDER — MORPHINE SULFATE 2 MG/ML
2 INJECTION, SOLUTION INTRAMUSCULAR; INTRAVENOUS ONCE
Status: COMPLETED | OUTPATIENT
Start: 2020-01-01 | End: 2020-01-01

## 2020-01-01 RX ORDER — UMECLIDINIUM BROMIDE AND VILANTEROL TRIFENATATE 62.5; 25 UG/1; UG/1
1 POWDER RESPIRATORY (INHALATION) DAILY
Qty: 1 INHALER | Refills: 5 | Status: SHIPPED | OUTPATIENT
Start: 2020-01-01

## 2020-01-01 RX ORDER — SUCCINYLCHOLINE CHLORIDE 20 MG/ML
INJECTION INTRAMUSCULAR; INTRAVENOUS AS NEEDED
Status: DISCONTINUED | OUTPATIENT
Start: 2020-01-01 | End: 2020-01-01 | Stop reason: HOSPADM

## 2020-01-01 RX ORDER — AMLODIPINE BESYLATE 2.5 MG/1
2.5 TABLET ORAL DAILY
Qty: 90 TAB | Refills: 2 | Status: SHIPPED | OUTPATIENT
Start: 2020-01-01 | End: 2020-01-01 | Stop reason: ALTCHOICE

## 2020-01-01 RX ORDER — ONDANSETRON 2 MG/ML
4 INJECTION INTRAMUSCULAR; INTRAVENOUS AS NEEDED
Status: CANCELLED | OUTPATIENT
Start: 2020-01-01

## 2020-01-01 RX ORDER — INSULIN GLARGINE 100 [IU]/ML
0.2 INJECTION, SOLUTION SUBCUTANEOUS
Status: DISCONTINUED | OUTPATIENT
Start: 2020-01-01 | End: 2020-01-01 | Stop reason: HOSPADM

## 2020-01-01 RX ORDER — FENTANYL CITRATE 50 UG/ML
25 INJECTION, SOLUTION INTRAMUSCULAR; INTRAVENOUS
Status: CANCELLED | OUTPATIENT
Start: 2020-01-01

## 2020-01-01 RX ORDER — FONDAPARINUX SODIUM 2.5 MG/.5ML
2.5 INJECTION SUBCUTANEOUS EVERY 24 HOURS
Status: DISCONTINUED | OUTPATIENT
Start: 2020-01-01 | End: 2020-01-01 | Stop reason: HOSPADM

## 2020-01-01 RX ORDER — ACETAMINOPHEN 325 MG/1
650 TABLET ORAL
Status: DISCONTINUED | OUTPATIENT
Start: 2020-01-01 | End: 2020-01-01 | Stop reason: HOSPADM

## 2020-01-01 RX ORDER — HYDROCODONE BITARTRATE AND ACETAMINOPHEN 5; 325 MG/1; MG/1
1 TABLET ORAL
Status: DISCONTINUED | OUTPATIENT
Start: 2020-01-01 | End: 2020-01-01

## 2020-01-01 RX ORDER — POTASSIUM CHLORIDE 750 MG/1
20 TABLET, FILM COATED, EXTENDED RELEASE ORAL 2 TIMES DAILY
Status: DISCONTINUED | OUTPATIENT
Start: 2020-01-01 | End: 2020-01-01 | Stop reason: HOSPADM

## 2020-01-01 RX ADMIN — POTASSIUM CHLORIDE 20 MEQ: 750 TABLET, FILM COATED, EXTENDED RELEASE ORAL at 17:45

## 2020-01-01 RX ADMIN — Medication 10 ML: at 06:04

## 2020-01-01 RX ADMIN — MIDAZOLAM HYDROCHLORIDE 1 MG: 1 INJECTION, SOLUTION INTRAMUSCULAR; INTRAVENOUS at 15:27

## 2020-01-01 RX ADMIN — FONDAPARINUX SODIUM 2.5 MG: 2.5 INJECTION, SOLUTION SUBCUTANEOUS at 16:42

## 2020-01-01 RX ADMIN — MORPHINE SULFATE 2 MG: 2 INJECTION, SOLUTION INTRAMUSCULAR; INTRAVENOUS at 00:57

## 2020-01-01 RX ADMIN — MORPHINE SULFATE 2 MG: 2 INJECTION, SOLUTION INTRAMUSCULAR; INTRAVENOUS at 21:34

## 2020-01-01 RX ADMIN — MORPHINE SULFATE 15 MG: 15 TABLET ORAL at 00:31

## 2020-01-01 RX ADMIN — MORPHINE SULFATE 2 MG: 2 INJECTION, SOLUTION INTRAMUSCULAR; INTRAVENOUS at 02:57

## 2020-01-01 RX ADMIN — MORPHINE SULFATE 4 MG: 2 INJECTION, SOLUTION INTRAMUSCULAR; INTRAVENOUS at 11:25

## 2020-01-01 RX ADMIN — SODIUM CHLORIDE: 900 INJECTION, SOLUTION INTRAVENOUS at 09:39

## 2020-01-01 RX ADMIN — Medication 10 ML: at 21:22

## 2020-01-01 RX ADMIN — POTASSIUM CHLORIDE 20 MEQ: 750 TABLET, FILM COATED, EXTENDED RELEASE ORAL at 08:43

## 2020-01-01 RX ADMIN — Medication 10 ML: at 06:13

## 2020-01-01 RX ADMIN — ONDANSETRON HYDROCHLORIDE 4 MG: 2 INJECTION, SOLUTION INTRAMUSCULAR; INTRAVENOUS at 09:52

## 2020-01-01 RX ADMIN — MORPHINE SULFATE 2 MG: 2 INJECTION, SOLUTION INTRAMUSCULAR; INTRAVENOUS at 12:14

## 2020-01-01 RX ADMIN — ARFORMOTEROL TARTRATE 15 MCG: 15 SOLUTION RESPIRATORY (INHALATION) at 19:55

## 2020-01-01 RX ADMIN — SACUBITRIL AND VALSARTAN 1 TABLET: 49; 51 TABLET, FILM COATED ORAL at 09:42

## 2020-01-01 RX ADMIN — ARFORMOTEROL TARTRATE 15 MCG: 15 SOLUTION RESPIRATORY (INHALATION) at 07:17

## 2020-01-01 RX ADMIN — MORPHINE SULFATE 2 MG: 2 INJECTION, SOLUTION INTRAMUSCULAR; INTRAVENOUS at 11:47

## 2020-01-01 RX ADMIN — SUCCINYLCHOLINE CHLORIDE 80 MG: 20 INJECTION, SOLUTION INTRAMUSCULAR; INTRAVENOUS at 08:45

## 2020-01-01 RX ADMIN — IPRATROPIUM BROMIDE 0.5 MG: 0.5 SOLUTION RESPIRATORY (INHALATION) at 04:00

## 2020-01-01 RX ADMIN — LIDOCAINE HYDROCHLORIDE 10 ML: 10 INJECTION, SOLUTION EPIDURAL; INFILTRATION; INTRACAUDAL; PERINEURAL at 16:02

## 2020-01-01 RX ADMIN — MORPHINE SULFATE 2 MG: 2 INJECTION, SOLUTION INTRAMUSCULAR; INTRAVENOUS at 03:07

## 2020-01-01 RX ADMIN — ROCURONIUM BROMIDE 45 MG: 10 SOLUTION INTRAVENOUS at 08:56

## 2020-01-01 RX ADMIN — IOPAMIDOL 100 ML: 755 INJECTION, SOLUTION INTRAVENOUS at 10:05

## 2020-01-01 RX ADMIN — MORPHINE SULFATE 2 MG: 2 INJECTION, SOLUTION INTRAMUSCULAR; INTRAVENOUS at 21:21

## 2020-01-01 RX ADMIN — POTASSIUM CHLORIDE 20 MEQ: 750 TABLET, FILM COATED, EXTENDED RELEASE ORAL at 09:23

## 2020-01-01 RX ADMIN — IOPAMIDOL 100 ML: 755 INJECTION, SOLUTION INTRAVENOUS at 11:09

## 2020-01-01 RX ADMIN — MORPHINE SULFATE 2 MG: 2 INJECTION, SOLUTION INTRAMUSCULAR; INTRAVENOUS at 06:33

## 2020-01-01 RX ADMIN — MORPHINE SULFATE 2 MG: 2 INJECTION, SOLUTION INTRAMUSCULAR; INTRAVENOUS at 17:12

## 2020-01-01 RX ADMIN — INSULIN GLARGINE 32 UNITS: 100 INJECTION, SOLUTION SUBCUTANEOUS at 21:09

## 2020-01-01 RX ADMIN — CEFAZOLIN 2 G: 330 INJECTION, POWDER, FOR SOLUTION INTRAMUSCULAR; INTRAVENOUS at 08:51

## 2020-01-01 RX ADMIN — DIATRIZOATE MEGLUMINE AND DIATRIZOATE SODIUM 30 ML: 660; 100 LIQUID ORAL; RECTAL at 06:58

## 2020-01-01 RX ADMIN — MORPHINE SULFATE 2 MG: 2 INJECTION, SOLUTION INTRAMUSCULAR; INTRAVENOUS at 16:04

## 2020-01-01 RX ADMIN — FONDAPARINUX SODIUM 2.5 MG: 2.5 INJECTION, SOLUTION SUBCUTANEOUS at 14:03

## 2020-01-01 RX ADMIN — MORPHINE SULFATE 2 MG: 2 INJECTION, SOLUTION INTRAMUSCULAR; INTRAVENOUS at 10:01

## 2020-01-01 RX ADMIN — MORPHINE SULFATE 2 MG: 2 INJECTION, SOLUTION INTRAMUSCULAR; INTRAVENOUS at 09:42

## 2020-01-01 RX ADMIN — IPRATROPIUM BROMIDE 0.5 MG: 0.5 SOLUTION RESPIRATORY (INHALATION) at 20:18

## 2020-01-01 RX ADMIN — MORPHINE SULFATE 15 MG: 15 TABLET ORAL at 12:14

## 2020-01-01 RX ADMIN — INSULIN GLARGINE 32 UNITS: 100 INJECTION, SOLUTION SUBCUTANEOUS at 21:34

## 2020-01-01 RX ADMIN — PROPOFOL 150 MG: 10 INJECTION, EMULSION INTRAVENOUS at 08:45

## 2020-01-01 RX ADMIN — MORPHINE SULFATE 2 MG: 2 INJECTION, SOLUTION INTRAMUSCULAR; INTRAVENOUS at 05:26

## 2020-01-01 RX ADMIN — SACUBITRIL AND VALSARTAN 1 TABLET: 49; 51 TABLET, FILM COATED ORAL at 17:45

## 2020-01-01 RX ADMIN — LIDOCAINE HYDROCHLORIDE 100 MG: 20 INJECTION, SOLUTION EPIDURAL; INFILTRATION; INTRACAUDAL; PERINEURAL at 08:45

## 2020-01-01 RX ADMIN — IPRATROPIUM BROMIDE 0.5 MG: 0.5 SOLUTION RESPIRATORY (INHALATION) at 23:37

## 2020-01-01 RX ADMIN — METOPROLOL SUCCINATE 25 MG: 25 TABLET, EXTENDED RELEASE ORAL at 09:23

## 2020-01-01 RX ADMIN — SACUBITRIL AND VALSARTAN 1 TABLET: 49; 51 TABLET, FILM COATED ORAL at 17:12

## 2020-01-01 RX ADMIN — BUDESONIDE 500 MCG: 0.5 SUSPENSION RESPIRATORY (INHALATION) at 14:29

## 2020-01-01 RX ADMIN — Medication 10 ML: at 14:08

## 2020-01-01 RX ADMIN — ERGOCALCIFEROL 50000 UNITS: 1.25 CAPSULE ORAL at 08:52

## 2020-01-01 RX ADMIN — TORSEMIDE 20 MG: 20 TABLET ORAL at 11:24

## 2020-01-01 RX ADMIN — SACUBITRIL AND VALSARTAN 1 TABLET: 49; 51 TABLET, FILM COATED ORAL at 08:52

## 2020-01-01 RX ADMIN — GLYCOPYRROLATE 0.6 MG: 0.2 INJECTION, SOLUTION INTRAMUSCULAR; INTRAVENOUS at 09:46

## 2020-01-01 RX ADMIN — BIVALIRUDIN 1.75 MG/KG/HR: 250 INJECTION, POWDER, LYOPHILIZED, FOR SOLUTION INTRAVENOUS at 09:32

## 2020-01-01 RX ADMIN — IPRATROPIUM BROMIDE 0.5 MG: 0.5 SOLUTION RESPIRATORY (INHALATION) at 11:31

## 2020-01-01 RX ADMIN — Medication 10 ML: at 21:37

## 2020-01-01 RX ADMIN — MORPHINE SULFATE 15 MG: 15 TABLET ORAL at 00:09

## 2020-01-01 RX ADMIN — MORPHINE SULFATE 15 MG: 15 TABLET ORAL at 06:12

## 2020-01-01 RX ADMIN — SACUBITRIL AND VALSARTAN 1 TABLET: 49; 51 TABLET, FILM COATED ORAL at 10:01

## 2020-01-01 RX ADMIN — INSULIN GLARGINE 16 UNITS: 100 INJECTION, SOLUTION SUBCUTANEOUS at 22:37

## 2020-01-01 RX ADMIN — MORPHINE SULFATE 15 MG: 15 TABLET ORAL at 17:45

## 2020-01-01 RX ADMIN — HUMAN INSULIN 15 UNITS: 100 INJECTION, SOLUTION SUBCUTANEOUS at 08:13

## 2020-01-01 RX ADMIN — IPRATROPIUM BROMIDE 0.5 MG: 0.5 SOLUTION RESPIRATORY (INHALATION) at 07:17

## 2020-01-01 RX ADMIN — METOPROLOL SUCCINATE 25 MG: 25 TABLET, EXTENDED RELEASE ORAL at 08:43

## 2020-01-01 RX ADMIN — Medication 10 ML: at 05:26

## 2020-01-01 RX ADMIN — MORPHINE SULFATE 2 MG: 2 INJECTION, SOLUTION INTRAMUSCULAR; INTRAVENOUS at 21:10

## 2020-01-01 RX ADMIN — SACUBITRIL AND VALSARTAN 1 TABLET: 49; 51 TABLET, FILM COATED ORAL at 18:29

## 2020-01-01 RX ADMIN — LEVOFLOXACIN 250 MG: 5 INJECTION, SOLUTION INTRAVENOUS at 08:52

## 2020-01-01 RX ADMIN — MORPHINE SULFATE 2 MG: 2 INJECTION, SOLUTION INTRAMUSCULAR; INTRAVENOUS at 16:42

## 2020-01-01 RX ADMIN — Medication 10 ML: at 17:13

## 2020-01-01 RX ADMIN — MORPHINE SULFATE 15 MG: 15 TABLET ORAL at 18:10

## 2020-01-01 RX ADMIN — MIDAZOLAM HYDROCHLORIDE 1 MG: 1 INJECTION, SOLUTION INTRAMUSCULAR; INTRAVENOUS at 15:20

## 2020-01-01 RX ADMIN — ROCURONIUM BROMIDE 5 MG: 10 SOLUTION INTRAVENOUS at 08:45

## 2020-01-01 RX ADMIN — POTASSIUM CHLORIDE 20 MEQ: 750 TABLET, FILM COATED, EXTENDED RELEASE ORAL at 08:50

## 2020-01-01 RX ADMIN — IPRATROPIUM BROMIDE 0.5 MG: 0.5 SOLUTION RESPIRATORY (INHALATION) at 21:28

## 2020-01-01 RX ADMIN — ATORVASTATIN CALCIUM 40 MG: 20 TABLET, FILM COATED ORAL at 08:43

## 2020-01-01 RX ADMIN — LEVOFLOXACIN 500 MG: 5 INJECTION, SOLUTION INTRAVENOUS at 11:24

## 2020-01-01 RX ADMIN — PROMETHAZINE HYDROCHLORIDE 25 MG: 25 TABLET ORAL at 02:40

## 2020-01-01 RX ADMIN — MORPHINE SULFATE 2 MG: 2 INJECTION, SOLUTION INTRAMUSCULAR; INTRAVENOUS at 14:03

## 2020-01-01 RX ADMIN — MORPHINE SULFATE 15 MG: 15 TABLET ORAL at 06:04

## 2020-01-01 RX ADMIN — ATORVASTATIN CALCIUM 40 MG: 20 TABLET, FILM COATED ORAL at 09:23

## 2020-01-01 RX ADMIN — MORPHINE SULFATE 2 MG: 2 INJECTION, SOLUTION INTRAMUSCULAR; INTRAVENOUS at 07:40

## 2020-01-01 RX ADMIN — ATORVASTATIN CALCIUM 40 MG: 20 TABLET, FILM COATED ORAL at 08:50

## 2020-01-01 RX ADMIN — METOPROLOL SUCCINATE 25 MG: 25 TABLET, EXTENDED RELEASE ORAL at 08:50

## 2020-01-01 RX ADMIN — IPRATROPIUM BROMIDE 0.5 MG: 0.5 SOLUTION RESPIRATORY (INHALATION) at 14:14

## 2020-01-01 RX ADMIN — FENTANYL CITRATE 25 MCG: 50 INJECTION, SOLUTION INTRAMUSCULAR; INTRAVENOUS at 15:21

## 2020-01-01 RX ADMIN — LEVOFLOXACIN 250 MG: 5 INJECTION, SOLUTION INTRAVENOUS at 09:24

## 2020-01-01 RX ADMIN — POTASSIUM CHLORIDE 20 MEQ: 750 TABLET, FILM COATED, EXTENDED RELEASE ORAL at 18:11

## 2020-01-01 RX ADMIN — MORPHINE SULFATE 2 MG: 2 INJECTION, SOLUTION INTRAMUSCULAR; INTRAVENOUS at 19:20

## 2020-01-01 RX ADMIN — MORPHINE SULFATE 2 MG: 2 INJECTION, SOLUTION INTRAMUSCULAR; INTRAVENOUS at 09:48

## 2020-01-01 RX ADMIN — Medication 10 ML: at 21:10

## 2020-01-01 RX ADMIN — FENTANYL CITRATE 25 MCG: 50 INJECTION, SOLUTION INTRAMUSCULAR; INTRAVENOUS at 15:22

## 2020-01-01 RX ADMIN — ARFORMOTEROL TARTRATE 15 MCG: 15 SOLUTION RESPIRATORY (INHALATION) at 14:29

## 2020-01-01 RX ADMIN — POTASSIUM CHLORIDE 20 MEQ: 750 TABLET, FILM COATED, EXTENDED RELEASE ORAL at 17:12

## 2020-01-01 RX ADMIN — INSULIN LISPRO 2 UNITS: 100 INJECTION, SOLUTION INTRAVENOUS; SUBCUTANEOUS at 01:09

## 2020-01-01 RX ADMIN — MORPHINE SULFATE 15 MG: 15 TABLET ORAL at 12:52

## 2020-01-01 RX ADMIN — TOLVAPTAN 15 MG: 15 TABLET ORAL at 09:28

## 2020-01-01 RX ADMIN — BUDESONIDE 500 MCG: 0.5 SUSPENSION RESPIRATORY (INHALATION) at 19:55

## 2020-01-01 RX ADMIN — BUDESONIDE 500 MCG: 0.5 SUSPENSION RESPIRATORY (INHALATION) at 07:17

## 2020-01-01 RX ADMIN — NEOSTIGMINE METHYLSULFATE 5 MG: 1 INJECTION, SOLUTION INTRAVENOUS at 09:46

## 2020-01-01 RX ADMIN — FONDAPARINUX SODIUM 2.5 MG: 2.5 INJECTION, SOLUTION SUBCUTANEOUS at 17:22

## 2020-01-01 RX ADMIN — SODIUM CHLORIDE: 900 INJECTION, SOLUTION INTRAVENOUS at 08:45

## 2020-01-08 NOTE — ACP (ADVANCE CARE PLANNING)
Non-Provider Advance Care Planning (ACP) Note    Date of ACP Conversation: 12/18/2019  Persons included in Conversation: patient  Length of ACP Conversation in minutes: 16 minutes    Conversation requested by: Other:     Authorized Decision Maker (if patient is incapable of making informed decisions): This person is: Other Legally Authorized Decision Maker (e.g. Next of Kin)   Spouse- Bridgette Rudd       General ACP for ALL Patients with Decision Making Capacity:    Review of Existing Advance Directive: (Select questions covered)  DONOVAN discussed and reviewed with patient his current advance medical directive on file. SW informed patient that his document is invalid/illlegal due to incompletion. Patient has his self-listed as his secondary agent and there are no witness signatures. Patient stated that he and his spouse Bridgette Rudd) completed this form while in the hospital.  At that time, his spouse reported that she did not feel comfortable with making the final decision. He and his spouse have not had any further discussion about his wishes or her capability to make decision on his behalf. DONOVAN educated patient on the importance of having this document in place, and offered to have an AMD specialist contact him for further assistance with completing a new document. Patient reported that he is not interested in discussing or completing a new document at this time, however he was receptive to having SW mail him AMD education material to review. Interventions Provided:  Provided ACP educational materials:  Advance Directive Form and Your Right to [employer]    .

## 2020-01-09 NOTE — TELEPHONE ENCOUNTER
Patient called stating he cannot afford his medication-eliquis-he would like to know what to do-I advised that he needs to speak with Dr. Emily Roper, who ordered it, but I will send him patient assistance paperwork. He will call Dr. Emily Roper' office.

## 2020-01-14 NOTE — PROGRESS NOTES
Patient has graduated from the Care Transitions program on 1/14/20. Unable to reach the patient. CTN DONOVAN closed the referral.  No further Care Transition Social Work follow up scheduled. Goals Addressed This Visit's Progress  COMPLETED: Identification of barriers to adherence to a plan of care such as inability to afford medications, lack of insurance, lack of transportation, etc.     
   
1/14/2020 SW never received call back from patient for f/u to inquire about progress made with medication assistance. It was noted in chart by RN on 1/9/20, that she mailed patient  medication assistance paperwork for Eliquis to complete and advised him to f/u with Dr. Salvador Leslie for further instruction. SW previously discussed with patient the option for Medicare Extra Help, but patient stated due to his/spouse income he does not qualify. Corry Olivier [facility] Care Transitions Team  [address] Team 
[phone]   
 
1/8/2020  11:00am 
It is noted that patient is taking all of his medications as prescribed. He was able to have his PCP approve generic brand for Lantus. SW attempted to contact patient for update and offer additional assistance if needed.  answered the phone and stated that patient was not available and immediately hung up. SW attempted a 2nd call and left a detailed voicemail requesting a call back by the end of the week. Corry Olivier [facility] Care Transitions Team  [address] [facility] Care Coordination Team 
[phone] 
 
 
12/18/19- clarified with Mr. Dilan Ortiz that he may not be able to use the card for the free month's supply for Eliquis- he has already filled a prescription through insurance. He also cannot use more than one voucher in a lifetime. Asked him to check now if his pharmacy can fill his Eliquis and use the voucher before he is out of his Eliquis.   He may need to reach out to cardiology office for samples. Will work with SW to screen for eligibility for medication assistance through [employer]. [facility]  
 
12/18/2019  11:30am 
SW contacted patient to inquire about assistance needed. Patient reported that he is in the doughnut hole and can not afford to pay for some of his medication co-pays, which includes, an inhaler, Eloquis, Entresto and Lantus. He does not qualify for medicaid at this time. · (ANORO ELLIPTA) 62.5-25 mcg/actuation inhaler- cost for medication $108.00 · Eloquis- He has 2 free coupons for medication that will last him throughout his doughnut hole phase. · Carmen Schwartz- He is currently receiving patient medication assistance through the Advance HF clinic for this med. · Lantus- recently prescribed med cost $508.00, [employer] has a generic brand that is affordable. He contacted his PCP to make them aware of the cost of medication and to see if they can prescribe him the generic brand that he can afford. DONOVAN will mail patient an Anoro [employer] medication assistance form to review/ complete for possible participation in their program.  Kim Funes will contact at a later date and offer assistance if needed. [address] [facility] [address] [facility] [address] [facility] Team  12/13/2019 2:30pm 
It was reported that patient is in need of assistance with paying for his medications. SW called and left a detail message requesting a call back to discuss needs. DONOVAN will attempt contact at a later date if call is not returned. [address] [facility] Team  11/22/19-   Review of EMR - some notes about adherence with medications and about affording medications- reached out to MSW in advance HF clinic to see if she has insight to share and/or if she can assist.   
He shared with me today- the 30 days supply of Eliquis cost him over $120- relayed this to EP provider's nurse- he is most likely in donut hole and if he needs to stay on Eliquis past the initial 30 days - office will need to help out with samples until after January 1st- relayed this information to the vendor to talk with cardiac cath lab-EP [facility] at [facility] to make sure they are aware. Reached out to advance HF clinic- they are helping him with renewal for PA for Entresto. He does have Part D. She relays that his income is above guidelines for medicaid assistance- Medication Assistance with medicare. Sent referral ask our MSW to help with screening and look for PA options for his other medications. Reached out to [facility] department about notes about previous FA application. Will determine next steps if available. [facility]  
  
  COMPLETED: Prepare patients and caregivers for end of life decisions (ie. need for hospice, pain management, symptom relief, advance directives etc.)     
   
1/8/2020 11:00am 
SW attempted to contact patient to acknowledge receipt of AMD information that was mailed to him for review and completion.  answered the phone and stated that patient was not available and immediately hung up. SW attempted a 2nd call and left a detailed voicemail requesting a call back by the end of the week. SW consulted with ACP specialist regarding patient's incomplete document. AMD was flagged in the in chart to reflect status. SW will provide update regarding patient's interest in completing AMD at a later date if call is returned. Corry Olivier [facility] Care Transitions Team  [address] [facility] Coordination Team 
[phone] 
 
12/18/19- spoke with Mr. Dilan Ortiz- he states he feels that he is not depressed, but does feel that he is spending a lot of his time going to and seeing doctors and getting tests done. States that he is dealing well with \"all that is going on\".   Asked him to reach out to me or someone if he feels overwhelmed and/or needs help; always available to talk. Encouraged him to talk with Dr. Sierra Guzman and other healthcare providers as well. There are resources that can help. [facility]  
 
12/18/2019 11:30am 
SW discussed and reviewed with patient his current advance medical directive on file. SW informed patient that his document is not valid/legal due to incompletion. Patient has his self-listed as his secondary agent and there are no witness signatures. Patient stated that he and his spouse Onelia Uriostegui) completed this form while in the hospital.  At that time, his spouse reported that she did not feel comfortable with making the final decision. He and his spouse have not had any further discussion about his wishes or her capability to make decision on his behalf. SW educated patient on the importance of having this document in place, and offered to have an AMD specialist contact him for further assistance with completing a new document. Patient reported that he is not interested in discussing or completing a new document at this time, however he was receptive to having SW mail him AMD education material to review. SW will mail patient a copy of Your Right to Decide packet and follow up with him at a later date to confirm receipt of information and to offer assistance if needed. [address] Care Transitions Team  12/13/2019  2:30pm 
Patient has an advance medical directive on file however, it is incomplete. Patient's wishes are unclear and their are no witness signatures on the document. Patient has a signed DDNR on file and his spouse, Onelia Uriostegui is listed as his healthcare decision maker. SW will follow up with patient within 3-5 days to review, discuss AMD and offer assistance to complete a new form. [address] Care Transitions Team  Patient has Care Transitions  contact information for any further questions, concerns, or needs. Patients upcoming visits:   
Future Appointments Date Time Provider Shelley Alka 2/24/2020  2:10 PM Nigel Martínez MD [facility] SCHED  
3/10/2020 10:15 AM Olga Vivar NP [facility] SCHED  
5/11/2020 11:45 AM PACEMAKER, [facility] SCHED  
6/3/2020  1:00 PM Chuck Lee MD [address]  
8/19/2020  9:00 AM Terrence Hebert MD [address] [facility]

## 2020-01-15 NOTE — TELEPHONE ENCOUNTER
Patient states that he was referred to a vascular surgeon and needs the test he had done of his of his ankle brachial index put on a disk for him to be able to take to the surgeon. Please advise.      Phone: [phone]

## 2020-01-17 NOTE — PROGRESS NOTES
Pt dropped off [facility] Patient Noman paperwork to be completed please. Pt asked if you would fax all that he left to [facility] and also keep him a copy to  when completed. Fax number is [phone]. Phone number for Fernanda to call when ready is [phone]. Left in Emily's bin up front. Thanks.

## 2020-01-17 NOTE — TELEPHONE ENCOUNTER
2 pt identifiers used  Pt informed unsure if tech can do a CD & tech has already left for the day. I will know something Monday. Offered written results & he reports he will check with his physician to see if that is OK & we will discuss further on Monday.

## 2020-01-17 NOTE — TELEPHONE ENCOUNTER
The patient is calling to inquire about the results that he needs a copy of to take to the vascular surgeon on 1/21. Please advise.      Phone: [phone]

## 2020-01-20 NOTE — TELEPHONE ENCOUNTER
2 pt identifiers used  Informed pt per Zechariah Adler, vascular tech, no images to burnfor RICKY. Pt informed & [facility] . He reports he will contact physan and either they or he will call. Fax number also given to pt.

## 2020-01-21 NOTE — PROGRESS NOTES
[facility] application completed and order for Anoro attached. Faxed to  ([phone]) confirmation received. Called patient. (Estil) and informed application sent and original copy left up front for p/u. Elizabeth Samuels

## 2020-02-17 NOTE — ADVANCED PRACTICE NURSE
[facility] referral complete for A1C 11.4. Msg to JAN Armstrong NP with Dr Mandi Conner team and PCP to inform.

## 2020-02-18 NOTE — ANESTHESIA PREPROCEDURE EVALUATION
Relevant Problems No relevant active problems Anesthetic History No history of anesthetic complications Review of Systems / Medical History Patient summary reviewed, nursing notes reviewed and pertinent labs reviewed Pulmonary Within defined limits COPD Smoker Neuro/Psych Within defined limits 
seizures Cardiovascular Within defined limits CHF Dysrhythmias Pacemaker, CAD, PAD, cardiac stents and hyperlipidemia Exercise tolerance: <4 METS Comments: Echo (1/19/17):  EF 5-10% with severe GHK,. Mildly dil LA. Mild TR. PASP 46./systemic cardiomyopathy GI/Hepatic/Renal 
Within defined limits Hepatitis: type C Renal disease: CRI 
PUD Endo/Other Within defined limits Diabetes: type 1 Arthritis Other Findings Physical Exam 
 
Airway Mallampati: III 
TM Distance: 4 - 6 cm Neck ROM: normal range of motion Mouth opening: Normal 
 
 Cardiovascular Regular rate and rhythm,  S1 and S2 normal,  no murmur, click, rub, or gallop Dental 
 
Dentition: Full lower dentures and Full upper dentures Pulmonary Breath sounds clear to auscultation Abdominal 
GI exam deferred Other Findings Anesthetic Plan ASA: 4 Anesthesia type: general 
 
Monitoring Plan: Arterial line and LUÍS Induction: Intravenous Anesthetic plan and risks discussed with: Patient

## 2020-02-18 NOTE — ANESTHESIA PROCEDURE NOTES
Arterial Line Placement Start time: 2/18/2020 9:09 AM 
End time: 2/18/2020 9:19 AM 
Performed by: Steve Schmidt MD 
Authorized by: Steve Schmidt MD  
 
Pre-Procedure Indications:  Arterial pressure monitoring and blood sampling Preanesthetic Checklist: patient identified, risks and benefits discussed, anesthesia consent, site marked, patient being monitored, timeout performed and patient being monitored Procedure:  
Prep:  Chlorhexidine Seldinger Technique?: Yes Orientation:  Left Location:  Radial artery Catheter size:  22 G Number of attempts:  1 Assessment:  
Post-procedure:  Line secured and sterile dressing applied Patient Tolerance:  Patient tolerated the procedure well with no immediate complications

## 2020-02-18 NOTE — Clinical Note
Transseptal Cath Performed check box under hemodynamic and ICE and Fluoro, [place] 98cm via a guiding sheath.

## 2020-02-18 NOTE — Clinical Note
Sheath #1: Dressed using 4 X 4, non-adherent and transparent dressing. Site: clean, dry, & intact, no bleeding and no hematoma.

## 2020-02-18 NOTE — ANESTHESIA PROCEDURE NOTES
LUÍS 
 
 
 
Procedure Details: probe placement, image aquisition & interpretation Site marked Risks and benefits discussed with the patient and plans are to proceed Procedure Note Performed by: Staci Henriquez MD 
Authorized by: Staci Henriquez MD  
 
 
Indications: assessment of surgical repair and suspected pericardial effusion Modalities: 2D, CF, CWD, contrast, PWD Probe Type: multiplane Insertion: atraumatic Patient Status: intubated Echocardiographic and Doppler Measurements Aorta  Size  Diam(cm)  Dissection PlaqueThick(mm)  Plaque Mobile Ascending normal   0-3 No  
 Arch normal    No  
 Descending normal   >3 No  
 
 
 
 Valves  Annulus  Stenosis  Area/Grad  Regurg  Leaflet Morph  Leaflet Motion Aortic normal none  0 normal normal  
 Mitral dilated   3+ normal normal  
 Tricuspid normal none  2+ normal normal  
 
 
 
 Atria  Size  SEC (smoke)  Thrombus  Tumor  Device Rt Atrium dilated No No No No  
 Lt Atrium dilated No No No No  
 
Interatrial Septum Morphology: normal 
 
Interventricular Septum Morphology: normal 
 
Ventricle  Cavity Size  Cavity Dimension Hypertrophy  Thrombus  Gloal FXN  EF  
 RV normal  No no normal   
 LV normal  Yes No normal 30 Regional Function 
(1 = normal, 2 = mildly hypokinetic, 3 = severely hypokinetic, 4 = akinetic, 5 = dyskinetic) LAV - [place] ME LAV = 0  ME LAV = 90  ME LAV = 130 Basal Sept:2 Basal Ant:2 Basal Post:3 Mid Sept:2 Mid Ant:3 Mid Post:4 Apical Sept:3 Apical Ant:3 Basal Ant Sept:2 Basal Lat:2 Basal Inf:2 Mid Ant Sept:3 Mid Lat:2 Mid Inf:3 Apical Lat:3 Apical Inf:4   
 
 
Pericardium: normal 
Effusion: normal 
 
Post Intervention Follow-up Study Ventricular Regional Function: improved Valve  Function  Regurgitation  Area Aortic Mitral     
 Tricuspid Prosthetic Complications: None Comments:  On imaging, ANGE was measured to be 21238 mm in size but on fluoro the ANGE looked too small for watchman device hence the case was cancelled. The pericardial effusion was stable at 5mm and the Lv ef was 35% in the presence of 3+ MR.

## 2020-02-18 NOTE — ANESTHESIA POSTPROCEDURE EVALUATION
Procedure(s): WATCHMAN ANGE CLOSURE DEVICE. general 
 
Anesthesia Post Evaluation Patient location during evaluation: PACU Note status: Adequate. Level of consciousness: responsive to verbal stimuli and sleepy but conscious Pain management: satisfactory to patient Airway patency: patent Anesthetic complications: no 
Cardiovascular status: acceptable Respiratory status: acceptable Hydration status: acceptable Comments: +Post-Anesthesia Evaluation and Assessment Patient: Galileo Art MRN: [id number]  SSN: [id number] YOB: 1950  Age: 81 y.o. Sex: male Cardiovascular Function/Vital Signs /75 (BP 1 Location: Right arm, BP Patient Position: At rest)   Pulse 96   Temp 36.5 °C (97.7 °F)   Resp 16   Ht 5' 10\" (1.778 m)   Wt 90.3 kg (199 lb)   SpO2 90%   BMI 28.55 kg/m² Patient is status post Procedure(s): WATCHMAN ANGE CLOSURE DEVICE. Nausea/Vomiting: Controlled. Postoperative hydration reviewed and adequate. Pain: 
Pain Scale 1: Numeric (0 - 10) (02/18/20 0737) Pain Intensity 1: 0 (02/18/20 0737) Managed. Neurological Status: At baseline. Mental Status and Level of Consciousness: Arousable. Pulmonary Status:  
O2 Device: Nasal cannula (02/18/20 1015) Adequate oxygenation and airway patent. Complications related to anesthesia: None Post-anesthesia assessment completed. No concerns. I have evaluated the patient and the patient is stable and ready to be discharged from PACU . Signed By: Aditi Oconnell MD  
 2/18/2020 Vitals Value Taken Time /65 2/18/2020 10:37 AM  
Temp Pulse 79 2/18/2020 10:38 AM  
Resp SpO2 99 % 2/18/2020 10:38 AM  
Vitals shown include unvalidated device data.

## 2020-02-18 NOTE — PROGRESS NOTES
1305: Lorna ambulated @ 7418 (time) approximately 100 feet. Patient tolerated ambulation without adverse advents. right groin (right/left, groin/arm)  without bleeding or hematoma noted.

## 2020-02-18 NOTE — PROGRESS NOTES
I have reviewed discharge instructions with the patient and spouse. The patient and spouse verbalized understanding. Copy of discharge instructions given to patient. Patient wheelchaired out via nurse. The wife is present to take him home.

## 2020-02-18 NOTE — H&P
HISTORY OF PRESENTING ILLNESS Randall Mclaughlin is a 81 y.o. male with ICM, LVEF 25%, CKD, PAF (Hx GIB), DM, Hepatitis C, CAD s/p  PCI, single chamber [employer] ICD presenting for follow-up to discuss possible EKG changes on a recent EKG performed during a heart failure clinic visit.  His EKG showed sinus rhythm with an incomplete left bundle branch block.  Previous EKGs show similar findings.  His amiodarone was discontinued recently. Gaudencio Foster had been on amiodarone for atrial fibrillation. He has a history of GI bleeding in the past which is precluded anticoagulation. 
  
  
 ACTIVE PROBLEM LIST  
  
    
Patient Active Problem List  
  Diagnosis Date Noted  Vitamin deficiency 01/29/2019  Chronic systolic congestive heart failure, NYHA class 3 ([address] 01/08/2019  Type 2 diabetes mellitus with diabetic neuropathy ([address] 12/06/2018  Neuropathy 11/19/2018  Claudication ([address] 10/09/2018  Tobacco dependence syndrome 10/09/2018  Noncompliance with medication regimen 09/11/2018  Proteinuria 06/06/2018  Vitamin D deficiency 06/04/2018  Polyp of colon 03/05/2018  Advance care planning 03/05/2018  Type 2 diabetes mellitus with nephropathy ([address] 01/11/2018  Hypokalemia 01/11/2018  ICD (implantable cardioverter-defibrillator) in place 12/04/2017  Peripheral vascular disease ([address] 09/18/2017  Coronary artery disease involving native heart without angina pectoris 08/17/2017  Systolic heart failure, ACC/AHA stage C ([address] 08/17/2017  Type 2 diabetes mellitus with complication ([address] 36/33/4981  Paroxysmal atrial fibrillation ([address] 03/06/2017  H/O: GI bleed 03/06/2017  Hepatitis C 03/06/2017  Chronic renal insufficiency 03/06/2017  Dyslipidemia 03/06/2017  Ischemic cardiomyopathy 01/20/2017  
 NAVARRO (dyspnea on exertion) 01/19/2017  
  
  
  
 PAST MEDICAL HISTORY  
  
    
Past Medical History:  
Diagnosis Date  Arthritis    
   hands/fingers  CAD (coronary artery disease)    
  involving native coronary artery of native heart without angina pectoris  Cardiomyopathy ([facility] 01/20/2017  
  A. Echo (1/19/17):  EF 5-10% with severe GHK,. Mildly dil LA. Mild TR. PASP 46./systemic cardiomyopathy  Chronic kidney disease    
 Chronic obstructive pulmonary disease (HCC)    
 Chronic renal insufficiency 03/06/2017  Chronic systolic congestive heart failure, NYHA class 3 ([address] 1/8/2019  Claudication ([address] 10/9/2018  Congestive heart failure ([address]    
 Diabetes mellitus ([address] 3/6/2017  
  IDDM  Dyslipidemia 3/6/2017  
  A. FLP (1/19/17): Tot 120, , HDL 19, LDL 76 (no Rx).  H/O: GI bleed 3/6/2017  Hepatitis C 3/6/2017  Hypokalemia    
 ICD (implantable cardioverter-defibrillator) in place    
 Ill-defined condition    
  flu 1/2018  Neuropathy 11/19/2018  Noncompliance with medication regimen 9/11/2018  Paroxysmal atrial fibrillation ([address] 3/6/2017  Peripheral vascular disease ([address] 9/18/2017  
  A. RICKY (3/20/17): Right 0.58, Left 0.56.  Tobacco dependence syndrome 10/9/2018  Vitamin D deficiency 6/4/2018  
  
  
  
 PAST SURGICAL HISTORY  
  
     
Past Surgical History:  
Procedure Laterality Date  COLONOSCOPY N/A 2/17/2017  
  COLONOSCOPY performed by Enrico Woods. Rachael Merino MD at [facility] ENDOSCOPY  COLONOSCOPY N/A 2/5/2018  
  COLONOSCOPY performed by Enrico Woods. Rachael Merino MD at [address] HX CORONARY STENT PLACEMENT      
  LM, RCA x 2   
 HX GI      
  colonoscopy x 3 - last 2/2017 - polyp  HX IMPLANTABLE CARDIOVERTER DEFIBRILLATOR      
  
  
  
 ALLERGIES  
  
     
Allergies Allergen Reactions  Heparin (Porcine) Other (comments)  
    Was told when in the hospital that if he received heparin again that it would be deadly.  
  
  
  
FAMILY HISTORY  
  
     
Family History Problem Relation Age of Onset  Hypertension Mother    
 Heart Disease Mother    
       MURMUR  
 Cancer Father    
      COLON  
 Colon Cancer Father    
 Other Sister    
      born totally handicapped/epileptic  Kidney Disease Sister    
       from renal shutdown  Heart Disease Brother    
 Other Brother    
      ?pancreatic cancer or ?pancreatitis  Cancer Maternal Uncle    
      toes and spread  Cancer Paternal Uncle    
      stomach  
 Heart Attack Maternal Grandfather 47  Cancer Paternal Grandfather    
      bone  Cancer Maternal Uncle    
      stomach  Cancer Maternal Uncle    
      lung  Anesth Problems Neg Hx    
 negative for cardiac disease 
  
  
 SOCIAL HISTORY  
  
Social History  
  
  
     
Socioeconomic History  Marital status:   
    Spouse name: Not on file  Number of children: Not on file  Years of education: Not on file  Highest education level: Not on file Tobacco Use  Smoking status: Current Some Day Smoker  
    Packs/day: 0.50  
    Years: 45.00  
    Pack years: 22.50  
    Last attempt to quit: 2017  
    Years since quittin.6  Smokeless tobacco: Never Used  Tobacco comment: using Nicotine patches Substance and Sexual Activity  Alcohol use: No  
 Drug use: Yes  
    Types: Marijuana  Sexual activity: Never  
    Partners: Female  
  
  
  
  
MEDICATIONS  
  
    
Current Outpatient Medications Medication Sig  torsemide (DEMADEX) 20 mg tablet Take 0.5 Tabs by mouth daily.  cholecalciferol (VITAMIN D3) 5,000 unit capsule Take 1 Cap by mouth daily.  sacubitril-valsartan (ENTRESTO) 97 mg/103 mg tablet Take 1 Tab by mouth two (2) times a day.  umeclidinium-vilanterol (ANORO ELLIPTA) 62.5-25 mcg/actuation inhaler Take 1 Puff by inhalation daily.  potassium chloride (K-DUR, KLOR-CON) 20 mEq tablet Take 1 Tab by mouth three (3) times daily. (Patient taking differently: Take 20 mEq by mouth two (2) times a day.)  metoprolol succinate (TOPROL-XL) 100 mg tablet Take 0.5 Tabs by mouth daily for 360 days. Indications: chronic heart failure  atorvastatin (LIPITOR) 40 mg tablet TAKE ONE TABLET BY MOUTH ONCE DAILY  Indications: excessive fat in the blood  insulin NPH/insulin regular (NOVOLIN 70/30 U-100 INSULIN) 100 unit/mL (70-30) injection INJECT 10 UNITS SUBCUTANEOUSLY IN THE MORNING AND 8 UNIT IN THE EVENING (Patient taking differently: INJECT 80  UNITS SUBCUTANEOUSLY IN THE MORNING AND 20 UNITS IN THE EVENING)  aspirin 81 mg chewable tablet Take 81 mg by mouth daily.  acetaminophen (TYLENOL EXTRA STRENGTH) 500 mg tablet Take 1,000 mg by mouth every six (6) hours as needed for Pain. Indications: BACK PAIN  
 Insulin Syringes, Disposable, 1 mL syrg Pt to take 10 units w/ breakfast and 8 units w/ dinner  
  
No current facility-administered medications for this visit.   
  
  
I have reviewed the nurses notes, vitals, problem list, allergy list, medical history, family, social history and medications. 
  
  
 REVIEW OF SYMPTOMS General: Pt denies excessive weight gain or loss. Pt is able to conduct ADL's HEENT: Denies blurred vision, headaches, hearing loss, epistaxis and difficulty swallowing. Respiratory: Denies cough, congestion, shortness of breath, NAVARRO, wheezing or stridor. Cardiovascular: Denies precordial pain, palpitations, edema or PND Gastrointestinal: Denies poor appetite, indigestion, abdominal pain or blood in stool Genitourinary: Denies hematuria, dysuria, increased urinary frequency Musculoskeletal: Denies joint pain or swelling from muscles or joints Neurologic: Denies tremor, paresthesias, headache, or sensory motor disturbance Psychiatric: Denies confusion, insomnia, depression Integumentray: Denies rash, itching or ulcers. Hematologic: Denies easy bruising, bleeding 
  
  
 PHYSICAL EXAMINATION There were no vitals filed for this visit. General: Well developed, in no acute distress. HEENT: No jaundice, oral mucosa moist, no oral ulcers Neck: Supple, no stiffness, no lymphadenopathy, supple Heart:  Normal S1/S2 negative S3 or S4. Regular, no murmur, gallop or rub, no jugular venous distention Respiratory: Clear bilaterally x 4, no wheezing or rales Abdomen:   Soft, non-tender, bowel sounds are active.  
Extremities:  No edema, normal cap refill, no cyanosis. Musculoskeletal: No clubbing, no deformities Neuro: A&Ox3, speech clear, gait stable, cooperative, no focal neurologic deficits Skin: Skin color is normal. No rashes or lesions. Non diaphoretic, moist. 
Vascular: 2+ pulses symmetric in all extremities 
  
  
 DIAGNOSTIC DATA EKG:  
  
  
 LABORATORY DATA Lab Results Component Value Date/Time  
  WBC 8.8 07/17/2019 12:00 AM  
  HGB 14.4 07/17/2019 12:00 AM  
  HCT 43.2 07/17/2019 12:00 AM  
  PLATELET 156 95/58/5655 12:00 AM  
  MCV 85 07/17/2019 12:00 AM  
  
     
Lab Results Component Value Date/Time  
  Sodium 140 08/20/2019 12:00 AM  
  Potassium 4.6 08/20/2019 12:00 AM  
  Chloride 96 08/20/2019 12:00 AM  
  CO2 22 08/20/2019 12:00 AM  
  Anion gap 7 04/04/2019 01:44 PM  
  Glucose 227 (H) 08/20/2019 12:00 AM  
  BUN 25 08/20/2019 12:00 AM  
  Creatinine 2.00 (H) 08/20/2019 12:00 AM  
  BUN/Creatinine ratio 13 08/20/2019 12:00 AM  
  GFR est AA 39 (L) 08/20/2019 12:00 AM  
  GFR est non-AA 34 (L) 08/20/2019 12:00 AM  
  Calcium 9.8 08/20/2019 12:00 AM  
  Bilirubin, total 0.6 08/20/2019 12:00 AM  
  AST (SGOT) 13 08/20/2019 12:00 AM  
  Alk. phosphatase 60 08/20/2019 12:00 AM  
  Protein, total 7.0 08/20/2019 12:00 AM  
  Albumin 4.4 08/20/2019 12:00 AM  
  Globulin 4.4 (H) 02/22/2017 03:16 AM  
  A-G Ratio 1.7 08/20/2019 12:00 AM  
  ALT (SGPT) 15 08/20/2019 12:00 AM  
  
  
  
 ASSESSMENT 1. Cardiomyopathy   
                      E. Ischemic 
                      B. NYHA class II 2. Atrial fibrillation                       J. Paroxysmal 
3. CAD, native 4. Percutaneous transluminal angioplasty 5. ICD  
                      [address] 
                      B. Single chamber 6. History of GI bleed 7. Incomplete LBBB 
  
  
 PLAN  
  
Watchman 
  
  
 
  
Rocío Velásquez MD 
Cardiac Electrophysiology / Cardiology 
  
[address], [address] [place] [address], [address] 
[phone] / [phone] Fax                                    [phone] / [phone] Fax

## 2020-02-18 NOTE — PROCEDURES
Cardiac Electrophysiology Report PATIENT INFORMATION Patient Name: Mary Hawk MRN: [id number]           Study Date: 2020 YOB: 1950   Age: 81 y.o. Gender: male Procedure:  Endocardial Left Atrial Appendage Best Jeffries Referring Physician:  Zenobia Hicks MD and Dr. Chan Her STAFF Duty Name Electrophysiologist Blair Evangelista MD  
Monitor Anesthesia Service Circulator Oziel Gracia RN; Demarco Taveras RN; Cheryl Gay RN; Carol Torres RN  
 
 
PATIENT HISTORY Mayi Romero Jr. is a 81 y. o. male with ICM, LVEF 25%, CKD, PAF (Hx GIB), DM, Hepatitis C, CAD s/p  PCI, single chamber [employer] ICD and history of GI bleeding in the past which is precluded anticoagulation. During planned attempt for WATCHMAN in the past, he was noted to have a mass along the superior aspect of his ANGE however this was later felt to be unlikely to represent thrombus. He now presents for reattempt at [facility]. PROCEDURE The patient was brought to the Cardiac Electrophysiology laboratory in a post-absorptive, fasting state. Informed consent was obtained. A peripheral IV was in place. Continuous electrocardiographic, blood pressure, O2 saturation and  CO2 monitoring was initiated. Self-adhesive cardioversion patches were positioned on the chest. 500 cc of normal saline was administered intravenously. General anesthesia was effectuated by the anesthesia service. Intraoperative transesophageal imaging was performed by the anesthesia service. There was no evidence of visualized thrombus in the left atrial appendage. Measurements of the ANGE ostium width and ANGE depth were performed. The patient was then prepped and draped in the usual sterile fashion. Both groins were infiltrated with a 50/50 mixture of Lidocaine (1%) and bupivicaine (0.5%).  Vascular access was obtained and an SL1 sheath and an 8F sheath were placed in the right common femoral vein using the modified Seldinger technique. Through the SL1 sheath, a 0.032, 145-cm Omicia wire was advanced to the level of the superior vena cava. After the guidewire was withdrawn, a Suraj transseptal needle was introduced into the sheath. The needle/sheath assembly was withdrawn under fluoroscopic and intraoperative LUÍS guidance until the tip of the sheath prolapsed into the fossa ovalis. Appropriate positioning was confirmed with multiple fluoroscopic views and LUÍS imaging. Transseptal access was obtained following delivery of RF energy with the [facility] needle. Systemic heparinization was initiated and the sheath was flushed continuously with heparinized saline. Anticoagulation status was monitored with frequent ACT measurements. Heparin was given in interrupted doses to maintain an ACT > 300 sec. The dilator was advanced over the wire, followed by the sheath. Mean left arterial pressure was measured and was found to be 11 mm Hg. The 0.032 wire was then exchanged for the [facility] wire which was positioned into the left superior pulmonary vein. The dilator was then removed over the wire. A [employer] double curve access sheath was then advanced over the wire into the left atrium. A 6F pigtail was then advanced over the wire and was then positioned into the left atrial appendage. Arteriogram of the left atrial appendage was performed however demonstrated an ANGE with inadequate depth to accommodate a 21 mm WATCHMAN device. There was no pericardial effusion noted at the conclusion of the procedure. All catheters and sheaths were removed and hemostasis obtained by utilizing suture mediated closure devices. The patient was extubated, hemodynamically stable, tolerated the procedure well and was transferred in stable condition. There were no immediate complications encountered during the procedure. There was minimal blood loss and no specimen were removed.   
 
 
 
IMAGING  
 
 
 
 
 
FINDINGS  
 1. The baseline ECG revealed sinus rhythm 2. LUÍS imaging demonstrated normal ANGE morphology. 3. ANGE arteriorgram demonstrated inadequate depth to accommodate WATCHMAN deployment RADIOLOGY SUMMARY Total   
Fluoro Time (minutes)  4.0 Dose Area Product (mGy)  399 CONCLUSIONS 1. Unable to place WATCHMAN due to inadequate ANGE depth despite LUÍS imaging reflecting the contrary 2. Consider epicardial ligation 3. Follow up with Mary Spring MD and Dr. Rony Salas as scheduled. Thank you, Mary Spring MD and Dr. Rony Salas for allowing me to participate in the care of this extraordinarily pleasant male. Solo Abraham MD 
Cardiac Electrophysiology / Cardiology [address], [address] [place] [address], [address] 
[phone] / [phone] Fax   [phone] / [phone] Fax

## 2020-02-18 NOTE — ROUTINE PROCESS
Cardiac Cath Lab Recovery Arrival Note: 
 
 
Khadar Flood. arrived to Cardiac Cath Lab, Recovery Area. Staff introduced to patient. Patient identifiers verified with NAME and DATE OF BIRTH. Procedure verified with patient. Consent forms reviewed and signed by patient or authorized representative and verified. Allergies verified. Patient informed of procedure and plan of care. Questions answered with review. Patient prepped for procedure, per orders from physician, prior to arrival. 
 
Patient on cardiac monitor, non-invasive blood pressure, SPO2 monitor. Patient is A&Ox 4. Patient reports no pain, no chest pain, occasional shortness of breath, no n/v. Patient in stretcher, in low position, with side rails up, call bell within reach, patient instructed to call of assistance as needed. Patient prep in: [facility] Recovery Area, Bed# 3. Family in: Wife: Cj Gaitan [phone].   
Prep by: Aurelio Brooke RN

## 2020-02-18 NOTE — PROGRESS NOTES
TRANSFER - IN REPORT: 
 
Verbal report received from yvette Clarke on Fernanda [employer] Loi[employer] Procedure shelby/attempted watchman procedure , from the Cardiac Cath lab, for routine progression of care. Report consisted of patients Situation, Background, Assessment and Recommendations(SBAR). Information from the following report(s) Procedure Summary, MAR and Recent Results was reviewed with the receiving clinician. Opportunity for questions and clarification was provided. Assessment completed upon patients arrival to [address] [place] [address] and care assumed. Cardiac Cath Lab Recovery Arrival Note: 
 
 Bj Fernandez. arrived to [facility] recovery area. Patient procedure= SHELBY/attempted watchman. Patient on cardiac monitor, non-invasive blood pressure, Patient status doing well without problems. Patient is A&Ox 4. Patient reports no pain, no chest pain, no n/v. Procedure site without any bleeding and no hematoma.

## 2020-02-18 NOTE — Clinical Note
TRANSFER - OUT REPORT:     Verbal report given to: CRNA. Report consisted of patient's Situation, Background, Assessment and   Recommendations(SBAR). Opportunity for questions and clarification was provided. Patient transported with a Registered Nurse.

## 2020-02-19 NOTE — TELEPHONE ENCOUNTER
Maximiliano Snowden, transitional care nurse from [facility], is requesting to speak with you about patient.      Phone: [phone]

## 2020-02-19 NOTE — PROGRESS NOTES
Hospital Discharge Follow-Up Date/Time:  2020 11:38am 
[employer] Care Transition Nurse Phone- [phone] Patient was admitted to [facility] on  and discharged same day for attempted placement of Watchman device. Patient was contacted within 1 business days of discharge. Top Challenges reviewed with the provider Patient reports he was told after procedure he could stop taking Eliquis. Will need to clarify this with cards. Patient is not sure if he will resume taking Plavix. Labs- A1C from  was 11.4 ( Of note when asked about his A1C, patient states that he ate a half a package of Oreo cookies a couple of days ago) Advance Care Planning:  
Does patient have an Advance Directive:  has DDNR on chart and unwitnessed AMD  
  
 
 
Was this a readmission? no  
 
Care Transition Nurse (CTN) contacted the patient by telephone to perform post hospital discharge assessment. Verified name and  with patient as identifiers. Provided introduction to self, and explanation of the CTN role. Patient received hospital discharge instructions. CTN reviewed discharge instructions and red flags with patient who verbalized understanding. Patient given an opportunity to ask questions and does not have any further questions or concerns at this time. The patient agrees to contact the PCP office for questions related to their healthcare. CTN provided contact information for future reference. Patients top risk factors for readmission:  ineffective coping, medical condition Medication(s):  
New Medications at Discharge: none Changed Medications at Discharge: none Discontinued Medications at Discharge: none Will clarify with cards whether patient is to stop Eliquis and if he will resume Plavix Medication reconciliation was performed with patient, who verbalizes understanding of administration of home medications.   There were no barriers to obtaining medications identified at this time. Patient has completed PAP paperwork for Entresto and insulin. Referral to Pharm D needed: no  
 
Current Outpatient Medications Medication Sig  
 sacubitril-valsartan (ENTRESTO) 97 mg/103 mg tablet Take 1 Tab by mouth two (2) times a day.  insulin NPH/insulin regular (NOVOLIN 70/30 U-100 INSULIN) 100 unit/mL (70-30) injection 10 Units by SubCUTAneous route Daily (before breakfast).  insulin NPH/insulin regular (NOVOLIN 70/30 U-100 INSULIN) 100 unit/mL (70-30) injection 8 Units by SubCUTAneous route Daily (before dinner).  cholecalciferol (VITAMIN D3) (5000 Units /125 mcg) capsule Take 1 Cap by mouth daily.  pen needle, diabetic (NOVOFINE PLUS) 32 gauge x 1/6\" ndle 1 Each by Does Not Apply route daily.  torsemide (DEMADEX) 20 mg tablet Take 0.5 Tabs by mouth as needed (for shortness of breath or edema). FYI dose decrease by JING Subramanian NP on 9/18/19 (Patient taking differently: Take 10 mg by mouth daily. FYI dose decrease by JING Subramanian NP on 9/18/19)  potassium chloride (K-DUR, KLOR-CON) 20 mEq tablet TAKE 1 TABLET BY MOUTH TWICE DAILY  metoprolol succinate (TOPROL-XL) 50 mg XL tablet Take 1 Tab by mouth daily.  atorvastatin (LIPITOR) 40 mg tablet TAKE ONE TABLET BY MOUTH ONCE DAILY  Indications: excessive fat in the blood  aspirin 81 mg chewable tablet Take 81 mg by mouth daily.  Insulin Syringes, Disposable, 1 mL syrg Pt to take 10 units w/ breakfast and 8 units w/ dinner  insulin NPH/insulin regular (NOVOLIN 70/30 U-100 INSULIN) 100 unit/mL (70-30) injection INJECT 10 UNITS SUBCUTANEOUSLY IN THE MORNING AND 8 UNITS IN THE EVENING  
 apixaban (ELIQUIS) 5 mg tablet Take 1 Tab by mouth two (2) times a day. No current facility-administered medications for this visit. There are no discontinued medications. [facility] follow up appointment(s):  
Future Appointments Date Time Provider Shelley Rucker 2/24/2020  2:10 PM Shaniqua Cramer MD [facility] SCHED  
3/10/2020 10:15 AM Isaac Zuniga NP [facility] SCHED  
5/11/2020 11:45 AM PACEMAKER, STHENRRY [facility] SCHED  
6/3/2020  1:00 PM Severo Gaster, MD [address]  
8/19/2020  9:00 AM Lesly Hebert MD [address] [facility] follow up appointment(s): none

## 2020-02-21 NOTE — PROGRESS NOTES
CTN contacted Dr. Escobar Figueroa office via in basket received message from Del Cazares NP and was advised that patient was given the option of continuing the Eliquis and discussed risks/benefits. If he continues Eliquis, he would stop aspirin. If he chooses not to take Eliquis, continue aspirin. No plavix. CTN contact Mr. Dilan Ortiz in reference to him discontinuing Eliquis and taking ASA. Patient had previously stated that he had discussed taking Eliquis with Dr. Escobar Figueroa and decided not to continue. The above information was relayed to the patient. He confirms today that he does not want to take Eliquis. He also sites cost as one of the issues. He is offered assistance with completing PAP paperwork if cost is the barrier, Patient declines stating \"I am sick of all this paperwork, I don't want to lift another pen\"  and \"I have the paperwork here and if I decide to start taking it I will fill it out\". Patient is frustrated but pleasant and appreciates the call and information. He confirms that he will continue to take ASA daily for now and if he has any question or wants to discuss this further he will contact Dr. Escobar Figueroa or Dr. Rigoberto Pittman office.

## 2020-02-24 NOTE — PROGRESS NOTES
Patient here for dm f/u, not fasting. Patient was not able to afford lantus , would need another if desired by Dr. Krishan Looney. 1. Have you been to the ER, urgent care clinic since your last visit? Hospitalized since your last visit? No    2. Have you seen or consulted any other health care providers outside of the [address] [place] [address] since your last visit? Include any pap smears or colon screening. No     Max Montiel.  2/24/2020  Provider:   Danny:  Diabetes Report Card   1) Have you seen the eye doctor in past year?no    2) How would you  rate your Diabetic Diet?good   3) How well do you take care of your feet?well   4) Do you keep your Primary Care Follow Up Appts? yes    5) Do you know your A1C goal?yes    6) Do you take your medications daily? yes    7) Do you check your blood sugars? yes    8) Have you gained weight?no       9) Do you follow an exercise program?yes   10) Can you do better?yes      Lab Results   Component Value Date/Time    Cholesterol, total 124 05/22/2019 10:16 AM    HDL Cholesterol 38 (L) 05/22/2019 10:16 AM    LDL, calculated 60 05/22/2019 10:16 AM    Triglyceride 128 05/22/2019 10:16 AM    CHOL/HDL Ratio 6.3 (H) 01/19/2017 03:50 AM     Lab Results   Component Value Date/Time    Hemoglobin A1c 11.4 (H) 02/14/2020 12:04 PM    Hemoglobin A1c 9.9 (H) 11/14/2019 03:48 PM    Hemoglobin A1c 7.3 (H) 05/22/2019 10:16 AM    Glucose 312 (H) 02/14/2020 12:04 PM    Glucose (POC) 426 (H) 02/18/2020 08:12 AM    Microalb/Creat ratio (ug/mg creat.) 1,127.9 (H) 12/06/2018 01:56 PM    LDL, calculated 60 05/22/2019 10:16 AM    Creatinine 1.76 (H) 02/14/2020 12:04 PM      Lab Results   Component Value Date/Time    Microalb/Creat ratio (ug/mg creat.) 1,127.9 (H) 12/06/2018 01:56 PM      Chief Complaint   Patient presents with    Diabetes     not fasting     he is a 81y.o. year old male who presents for evaluation. See Diabetic Report Card listed above.      Patient Active Problem List Diagnosis    PAD (peripheral artery disease) (HCC)    Presence of Watchman left atrial appendage closure device    CKD (chronic kidney disease) stage 3, GFR 30-59 ml/min (HCC)    Vitamin deficiency    Chronic systolic congestive heart failure, NYHA class 3 (HCC)    Type 2 diabetes mellitus with diabetic neuropathy (HCC)    Neuropathy    Claudication (HCC)    Tobacco dependence syndrome    Noncompliance with medication regimen    Proteinuria    Vitamin D deficiency    Polyp of colon    Advance care planning    Type 2 diabetes mellitus with nephropathy ([facility]    Hypokalemia    ICD (implantable cardioverter-defibrillator) in place    Peripheral vascular disease ([facility]     A. RICKY (3/20/17): Right 0.58, Left 0.56.  Coronary artery disease involving native heart without angina pectoris    Systolic heart failure, ACC/AHA stage C (HCC)    Type 2 diabetes mellitus with complication (HCC)    Paroxysmal atrial fibrillation (HCC)    H/O: GI bleed    Hepatitis C    Chronic renal insufficiency    Dyslipidemia     A. FLP (1/19/17): Tot 120, , HDL 19, LDL 76 (no Rx).  Ischemic cardiomyopathy     A.  Echo (1/19/17):  EF 5-10% with severe GHK,. Mildly dil LA. Mild TR. PASP 46. B. Cath (1/20/17):  LM ost70. LAD m50. D1 80. LCx d70; OM1 99, OM2 90. RCA p100. No AVG. PAP  53/27/36. C.  PCI (2/9/17): pRCA 2.5x38 Xience + 2.75x12 Xience. ostLM 4.0x12 Xience post-dil with 4.5x12 NC. D.  Echo (2/13/17):  EF 10% with severe GHK, PSM. Dil LA. Mod MR. Mild to mod TR. Left pleural effusion. E.  Echo (5/15/17): EF 25% with severe GHK and basl-mid inf AK, mildly dil LV, DD. Sev dil LA. Mod MR. Mild TR. PASP 35. F.  ICD (6/12/17, Anup): [employer].  NAVARRO (dyspnea on exertion)       Reviewed PmHx, RxHx, FmHx, SocHx, AllgHx--dated and updated in the chart.     Review of Systems - negative except as listed above in the HPI    Objective:     Vitals:    02/24/20 1412   BP: 130/61 Pulse: 75   Resp: 20   Temp: 98.4 °F (36.9 °C)   SpO2: 96%   Weight: 205 lb (93 kg)   Height: 5' 10\" (1.778 m)     Physical Examination: General appearance - alert, well appearing, and in no distress  Chest - clear to auscultation, no wheezes, rales or rhonchi, symmetric air entry  Heart - normal rate, regular rhythm, normal S1, S2, no murmurs, rubs, clicks or gallops    Assessment/ Plan:   Diagnoses and all orders for this visit:    1. Type 2 diabetes mellitus with complication (HCC)  -     insulin NPH/insulin regular (NOVOLIN 70/30 U-100 INSULIN) 100 unit/mL (70-30) injection; INJECT 40 UNITS SUBCUTANEOUSLY IN THE MORNING AND 20 UNITS IN THE EVENING  -has been out of insulin  -labs next OV    2. Paroxysmal atrial fibrillation ([facility])  3. PAD (peripheral artery disease) ([facility] [address] [facility]  4. Dyslipidemia  5. Chronic renal impairment, stage 3 (moderate) ([facility])  6. Systolic heart failure, ACC/AHA stage C ([facility] [address]  7. CKD (chronic kidney disease) stage 3, GFR 30-59 ml/min ([facility])  -all stable and seeing  MD         Follow-up and Dispositions    · Return in about 3 months (around 5/24/2020) for dm. Lab Results   Component Value Date/Time    Cholesterol, total 124 05/22/2019 10:16 AM    HDL Cholesterol 38 (L) 05/22/2019 10:16 AM    LDL, calculated 60 05/22/2019 10:16 AM    Triglyceride 128 05/22/2019 10:16 AM    CHOL/HDL Ratio 6.3 (H) 01/19/2017 03:50 AM     Lab Results   Component Value Date/Time    Hemoglobin A1c 11.4 (H) 02/14/2020 12:04 PM    Hemoglobin A1c 9.9 (H) 11/14/2019 03:48 PM    Hemoglobin A1c 7.3 (H) 05/22/2019 10:16 AM    Microalb/Creat ratio (ug/mg creat.) 1,127.9 (H) 12/06/2018 01:56 PM    LDL, calculated 60 05/22/2019 10:16 AM    Creatinine 1.76 (H) 02/14/2020 12:04 PM          Discussed with patient goal of Diabetes to include:  HgA1C <7, LDL cholesterol <100, Blood pressure <140/80. Discussed with patient diet and weight management and to get regular exercise. Recommend yearly eye exams and daily foot care. The patient understands and agrees with the plan. I have discussed the diagnosis with the patient and the intended plan as seen in the above orders. The patient has received an after-visit summary and questions were answered concerning future plans. Medication Side Effects and Warnings were discussed with patient  Patient Labs were reviewed and or requested  Patient Past Records were reviewed and or requested    Faraz Kent M.D. [address] [facility]    There are no Patient Instructions on file for this visit.

## 2020-03-06 NOTE — TELEPHONE ENCOUNTER
Patient returned call he confirmed appt, has no flu like symptoms and has not traveled outside of country.

## 2020-03-06 NOTE — TELEPHONE ENCOUNTER
LVM for patient for Tuesday's appt. I requested patient call back to confirm.     Please ask patient about travel/exposure/flu

## 2020-03-10 PROBLEM — I50.21: Status: ACTIVE | Noted: 2017-08-17

## 2020-03-10 NOTE — TELEPHONE ENCOUNTER
Tosha Kang with advanced heart failure is requesting to speak with the nurse regarding this patient. Please advise.     Phone: [phone] option 2

## 2020-03-10 NOTE — PROGRESS NOTES
[address] [facility] in [place], [place] Heart Failure Clinic Note Patient name: Preet Ernandez. Patient : 1950 Patient MRN: [id number] Date of service: 03/10/20 Primary care physician: Yoseph Rothman MD 
Primary cardiologist: Dr. Ty Soria CHIEF COMPLAINT: 
Chief Complaint Patient presents with  CHF PLAN: 
Continue current medical therapy for heart failure Continue current dose of toprol XL 50mg daily Continue Entresto 97/103 mg twice daily Cannot tolerate aldactone due to hyperkalemia; not interested in taking veltassa Continue Torsemide 10mg prn,  pt taking daily? Encourage albuterol inhaler use prn for SOB/wheezing Continue ASA, plavix and statin for CAD Start norvasc 2.5mg po daily for elevated 's Stopped amiodarone due to Hyperthyroidism, check thyroid profile every 3 months and PFT/DLCO every year ICD interrogation last check (19), follow-up with Dr. Dany August ECHO  19- LVEF 21-25%, LVGH, mild MR, mild TR, mild pulm HTN, est PA 45mmHg, LVIDd 5.32cm, Tapse 2.38cm Watchman aborted due to thrombus along coumadin ridge, not taking eliquis 5mg d/t to cost, only on ASA 81mg- f/u with Dr. Dany August Labs today: CBC, BMP, pro-NT-BNP, mag, Vitamin D-may adjust medications pending lab rsults Reinforced low salt diet, not adhering to low Na+/CHO diet Reinforced fluid restriction to 6 x 8oz glasses per day Discussed tobacco cessation, currently smoking 1ppd Advanced care plan present on the chart Counseled to obtain home scale and arm blood pressure cuff Document in diary BP/HR before and 2 hours after taking medications Discussed importance of daily weights, record weights and bring to next clinic visit Uncontrolled diabetes, HGBA1c 11.4 on (2020) Recently ran out of insulin, started on NPH by Dr. Myron Miranda Received flu vaccination Recommend  pneumonia vaccine every 5 years Follow up with Endocrinologist, Dr. Alvin Recinos, for hyperthyroidism Patient declines vascular surgery for PAD Follow-up with primary cardiologist, Dr. Ladarius العلي Follow-up with EP cardiologist, Dr. Tarah Paz Follow-up with PCP, Dr. Erica Brumfield, for diabetes management; 
Patient declined referral to pulmonary for COPD and sleep study Return to [facility] in 2-3 months with NP/MD 
 
IMPRESSION: 
Chronic systolic heart failure Stage C, NYHA class II symptoms Coronary artery disease S/p impella assisted high risk PCI with MIKE to 100% p-RCA and 70% LM 2/9/17 by Dr. Bob Palomino Ischemic cardiomyopathy, LVEF 25% HTN 
PAD not amenable to PTCA Incomplete LBBB 
CKD3 KELLY COPD S/p GI bleed and colorectal surgery Medical noncompliance Claudication PAD Hepatitis C 
 
CARDIAC IMAGING: 
Echo (7/11/18) LVEF 25%, akinesis of basal-mid inferior wall. PA pressure 42mmHg. ECHO (7/11/19) 21-25%, LVGH, mild MR, mild TR, mild pulm HTN, est PA 45mmHg, LVIDd 5.32cm, Tapse 2.38cm ICD interrogation (12/6/18) no events, normal device function, good batteries HEMODYNAMICS: 
RHC not done CPEST not done 6MW not done HISTORY OF PRESENT ILLNESS: 
[facility]. Briefly, Bina Roldan is a 81 y.o. male with h/o CAD s/p PCI of LM,  of RCA, ischemic cardiomyopathy with LVEF 08%, s/p AICD complicated by  PAF (amiodarone), Hep C, GI bleed (s/p colorectal surgery), current tobacco abuse and PAD. INTERVAL HISTORY: 
Today, patient presents for routine clinic visit. Patient is doing better. Continues to smoke >1ppd of cigarettes, attributes this to increased life stressors. Denies symptoms of presyncope, syncope, lightheadedness, dizziness, orthopnea, PND, nocturia, CP, palpitations, or SOB.   
 
Patient walked to our clinic from parking lot without having to slow down or stop. Patient can walk more than one block without symptoms of fatigue or shortness of breath. Patient can perform home activities without a problem and routinely participates in daily walking for more than 15 minutes. He reports NAVARRO with fast walking as the only time he is dyspneic or while doing yard work. Patient denies symptoms of volume overload, although his has significant abdominal fat he attributes to overeating. He reports increased appetite and reports dietary discrepancies attributing his weight gain over the last 3 months. His weight is stable at 200lbs from last clinic visit. He is taking torsemide 10mg daily. Patient denies irregular heart rate or palpitations. ICD has not fired. He was being worked up for [employer], procedure was aborted due to thrombus along the coumadin ridge. He is not taking eliquis d/t to cost, only on ASA 81mg. Patient denies other cardiac symptoms such as chest pain. Positive for claudication. Has been referred tp Dr. Sienna Pena for PAD/PVD and has decided not to pursue surgical intervention for his PAD at this time. Patient is not compliant with fluid restriction or always taking medications as prescribed. Discussed importance of medication compliance and smoking cessation. He reports not taking home BPs, he did not not bring his home BP cuff with him today. REVIEW OF SYSTEMS: 
General: Denies fever, night sweats. Ear, nose and throat: Denies difficulty hearing, sinus problems, runny nose, post-nasal drip, ringing in ears, mouth sores, loose teeth, ear pain, nosebleeds, sore throate, facial pain or numbess Cardiovascular: see above in the interval history Respiratory: +non productive congested cough, Denies hemoptysis. Gastrointestinal: Denies heartburn, constipation, intolerance to certain foods, diarrhea, abdominal pain, nausea, vomiting, difficulty swallowing, blood in stool Kidney and bladder: Denies painful urination, frequent urination, urgency, prostate problems and impotence Musculoskeletal: Denies joint pain, muscle weakness, +claudication Skin and hair: Denies change in existing skin lesions, hair loss or increase, breast changes PHYSICAL EXAM: 
Vital signs:  
 
Visit Vitals /82 (BP 1 Location: Left arm, BP Patient Position: Sitting) Pulse 72 Temp 97.6 °F (36.4 °C) (Oral) Resp 16 Ht 5' 10\" (1.778 m) Wt 200 lb 12.8 oz (91.1 kg) SpO2 97% BMI 28.81 kg/m² General: Patient is well developed, well-nourished in no acute distress HEENT: Normocephalic and atraumatic. No scleral icterus. Pupils are equal, round and reactive to light and accomodation. No conjunctival injection. Oropharynx is clear. Neck: Supple. No evidence of thyroid enlargements or lymphadenopathy. JVD: Cannot be appreciated Lungs: scattered wheezing, congested cough Heart: Regular rate and rhythm with normal S1 and S2. No murmurs, gallops or rubs. ICD in RU chest 
Abdomen: Soft, obese, rotund, no mass or tenderness. No organomegaly or hernia. Bowel sounds present. Genitourinary and rectal: deferred Extremities: No cyanosis, clubbing, or edema. flaky BLE, pink/yasir Neurologic: No focal sensory or motor deficits are noted. Grossly intact. Psychiatric: Awake, alert and oriented x 3. Appropriate mood and affect Skin: Warm, dry and well perfused, No lesions, nodules or rashes are noted. PAST MEDICAL HISTORY: 
Past Medical History:  
Diagnosis Date  Arthritis   
 hands/fingers-OSTEO  
 CAD (coronary artery disease)   
 involving native coronary artery of native heart without angina pectoris  Cardiomyopathy ([facility] [address] [address] 01/20/2017 A. Echo (1/19/17):  EF 5-10% with severe GHK,. Mildly dil LA. Mild TR. PASP 46./systemic cardiomyopathy  Chronic kidney disease  Chronic obstructive pulmonary disease ([facility] [address]  Chronic renal insufficiency 03/06/2017  Chronic systolic congestive heart failure, NYHA class 3 ([address] 2019  Claudication ([address] 10/9/2018  Congestive heart failure ([address]  Diabetes mellitus ([address] 3/6/2017 IDDM  Dyslipidemia 3/6/2017 A. FLP (17): Tot 120, , HDL 19, LDL 76 (no Rx).  H/O: GI bleed 3/6/2017  Hepatitis C 3/6/2017  Hypokalemia  ICD (implantable cardioverter-defibrillator) in place  Ill-defined condition   
 flu 2018  Neuropathy 2018  Noncompliance with medication regimen 2018  Paroxysmal atrial fibrillation ([address] 3/6/2017  Peripheral vascular disease ([address] 2017 A. RICKY (3/20/17): Right 0.58, Left 0.56.  Seizures ([address] WITH HEPARIN  
 Tobacco dependence syndrome 10/9/2018  Vitamin D deficiency 2018 PAST SURGICAL HISTORY: 
Past Surgical History:  
Procedure Laterality Date  CARDIAC SURG PROCEDURE UNLIST CORONARY STENTX 3 2 CORONARY, & 1 FEMORAL  
 COLONOSCOPY N/A 2017 COLONOSCOPY performed by Princess Andre. Gloria Miller MD at [facility] ENDOSCOPY  COLONOSCOPY N/A 2018 COLONOSCOPY performed by Princess Andre. Gloria Miller MD at [facility] ENDOSCOPY  COLONOSCOPY N/A 10/22/2019 COLONOSCOPY performed by Chasity Bay MD at [facility] ENDOSCOPY  
 HX CORONARY STENT PLACEMENT    
 LM, RCA x 2   
 HX ENDOSCOPY  10/22/2019 [facility]   
 HX GI    
 colonoscopy x 3 - last 2017 - polyp  HX IMPLANTABLE CARDIOVERTER DEFIBRILLATOR  HX OTHER SURGICAL    
 HX PACEMAKER    
 AICD  VASCULAR SURGERY PROCEDURE UNLIST  2017 STENT - RIGHT FEMORAL   
 
 
FAMILY HISTORY: 
Family History Problem Relation Age of Onset  Hypertension Mother  Heart Disease Mother MURMUR  
 Cancer Father COLON  
 Colon Cancer Father  Other Sister   
     born totally handicapped/epileptic  Kidney Disease Sister   
      from renal shutdown  Heart Disease Brother  Other Brother ?pancreatic cancer or ?pancreatitis  Cancer Brother PANCREATIC? AND COLON  
 Cancer Maternal Uncle   
     toes and spread  Cancer Paternal Uncle   
     stomach  
 Heart Attack Maternal Grandfather 47  Cancer Paternal Grandfather   
     bone  Cancer Maternal Uncle   
     stomach  Cancer Maternal Uncle   
     lung  No Known Problems Son  No Known Problems Son  Anesth Problems Neg Hx SOCIAL HISTORY: 
Social History Socioeconomic History  Marital status:  Spouse name: Not on file  Number of children: Not on file  Years of education: Not on file  Highest education level: Not on file Tobacco Use  Smoking status: Current Every Day Smoker Packs/day: 1.00 Years: 55.00 Pack years: 55.00  Smokeless tobacco: Never Used  Tobacco comment:    
Substance and Sexual Activity  Alcohol use: Yes Comment: 1 BEER WEEKLY  Drug use: Not Currently  Sexual activity: Yes  
  Partners: Female LABORATORY RESULTS: 
  
Labs Latest Ref Rng & Units 2/14/2020 WBC 4.1 - 11.1 K/uL 6.1  
RBC 4.10 - 5.70 M/uL 4.54 Hemoglobin 12.1 - 17.0 g/dL 13.1 Hematocrit 36.6 - 50.3 % 40.2 MCV 80.0 - 99.0 FL 88.5 Platelets 754 - 847 K/uL 123(L) Lymphocytes 12 - 49 % 13 Monocytes 5 - 13 % 8 Eosinophils 0 - 7 % 2 Basophils 0 - 1 % 1 Albumin 3.5 - 5.0 g/dL 3.6 Calcium 8.5 - 10.1 MG/DL 9.1 SGOT 15 - 37 U/L 9(L) Glucose 65 - 100 mg/dL 312(H) BUN 6 - 20 MG/DL 23(H) Creatinine 0.70 - 1.30 MG/DL 1.76(H) Sodium 136 - 145 mmol/L 139 Potassium 3.5 - 5.1 mmol/L 3.8 Some recent data might be hidden Lab Results Component Value Date/Time TSH 5.260 (H) 09/06/2019 01:51 PM  
 TSH 0.129 (L) 07/17/2019 12:00 AM  
 TSH 0.049 (L) 04/10/2019 12:00 AM  
 TSH 1.050 12/06/2018 01:56 PM  
 TSH 3.040 12/04/2017 10:17 AM  
 TSH 2.04 01/22/2017 11:11 AM  
 TSH 1.49 01/18/2017 11:12 PM  
 
 
CURRENT MEDICATIONS: 
 
Current Outpatient Medications:   umeclidinium-vilanteroL (ANORO ELLIPTA) 62.5-25 mcg/actuation inhaler, Take 1 Puff by inhalation daily. , Disp: , Rfl:  
  insulin NPH/insulin regular (NOVOLIN 70/30 U-100 INSULIN) 100 unit/mL (70-30) injection, INJECT 40 UNITS SUBCUTANEOUSLY IN THE MORNING AND 20 UNITS IN THE EVENING (Patient taking differently: INJECT 20 UNITS SUBCUTANEOUSLY IN THE MORNING AND 10 UNITS IN THE EVENING), Disp: 10 mL, Rfl: 5 
  sacubitril-valsartan (ENTRESTO) 97 mg/103 mg tablet, Take 1 Tab by mouth two (2) times a day., Disp: , Rfl:  
  cholecalciferol (VITAMIN D3) (5000 Units /125 mcg) capsule, Take 1 Cap by mouth daily. , Disp: 30 Cap, Rfl: 2 
  pen needle, diabetic (NOVOFINE PLUS) 32 gauge x 1/6\" ndle, 1 Each by Does Not Apply route daily. , Disp: 100 Pen Needle, Rfl: 1 
  torsemide (DEMADEX) 20 mg tablet, Take 0.5 Tabs by mouth as needed (for shortness of breath or edema). FYI dose decrease by JING Mobley NP on 9/18/19 (Patient taking differently: Take 10 mg by mouth daily. FYI dose decrease by JING Mobley NP on 9/18/19), Disp: 180 Tab, Rfl: 1 
  potassium chloride (K-DUR, KLOR-CON) 20 mEq tablet, TAKE 1 TABLET BY MOUTH TWICE DAILY, Disp: 60 Tab, Rfl: 3 
  metoprolol succinate (TOPROL-XL) 50 mg XL tablet, Take 1 Tab by mouth daily. , Disp: 90 Tab, Rfl: 3 
  atorvastatin (LIPITOR) 40 mg tablet, TAKE ONE TABLET BY MOUTH ONCE DAILY  Indications: excessive fat in the blood, Disp: 90 Tab, Rfl: 3 
  aspirin 81 mg chewable tablet, Take 81 mg by mouth daily. , Disp: , Rfl:  
  Insulin Syringes, Disposable, 1 mL syrg, Pt to take 10 units w/ breakfast and 8 units w/ dinner, Disp: 500 Syringe, Rfl: 1 Thank you for your referral and allowing me to participate in this patient's care. Lesvia Roman [address] [place] [address] [address] Phone: [phone] Fax: [phone]

## 2020-03-10 NOTE — TELEPHONE ENCOUNTER
Spoke with Shara Holcomb at HF clinic. Patient had failed Watchman attempt 2/18/2020 and states he was told he no longer needs to take Eliquis, that Aspirin is all he needs to take daily. Discussed that unable to locate a note that states he no longer needs Eliquis. Will forward to both Dr. Britney Brower and Dr. Thalia Powell for recommendation on continuing Eliquis at this time. Should he need to continue Eliquis, patient will need help with applying for patient assistance.

## 2020-03-10 NOTE — PATIENT INSTRUCTIONS
Medication changes: 
 
Start norvasc (amylodipine) 2.5mg, take 1 tablet daily Please take this to your pharmacy to notify them of the change in medications. Testing Ordered: 
 
Labs- with call with abnormal results Other Recommendations:  
  
Ensure your drinking an adequate amount of water with a goal of 6-8 eight ounce glasses (1.5-2 liters) of fluid daily. Your urine should be clear and light yellow straw colored. If your blood pressure begins to consistently run below 90/60 and/or you begin to experience dizziness or lightheadedness, please contact the [facility] at [phone]. Follow up with MD/NPin 2-3 months with [facility] Please monitor your blood pressures daily prior to medications and 2 hours after taking medications. Bring a written record of your blood pressures to your next appointment. Please monitor your weights daily upon waking and after using the bathroom. Keep a written records of your weights and bring to your next appointment. If you have a weight gain of 3 or more pounds overnight OR 5 or more pounds in one week please contact our office. Thank you for allowing us the privilege of being a part of your healthcare team! Please do not hesitate to contact our office at [phone] with any questions or concerns. Learning About Heart Failure Zones What are heart failure zones? Heart failure zones give you an easy way to see changes in your heart failure symptoms. They also tell you when you need to get help. Check every day to see which zone you are in. Green zone. You are doing well. This is where you want to be. · Your weight is stable. This means it is not going up or down. · You breathe easily. · You are sleeping well. You are able to lie flat without shortness of breath. · You can do your usual activities. Yellow zone. Be careful. Your symptoms are changing. Call your doctor. · You have new or increased shortness of breath. · You are dizzy or lightheaded, or you feel like you may faint. · You have sudden weight gain, such as more than 2 to 3 pounds in a day or 5 pounds in a week. (Your doctor may suggest a different range of weight gain.) · You have increased swelling in your legs, ankles, or feet. · You are so tired or weak that you cannot do your usual activities. · You are not sleeping well. Shortness of breath wakes you up at night. You need extra pillows. Your doctor's name: ____________________________________________________________ Your doctor's contact information: _________________________________________________ Red zone. This is an emergency. Call 911. You have symptoms of sudden heart failure, such as: 
· You have severe trouble breathing. · You cough up pink, foamy mucus. · You have a new irregular or fast heartbeat. You have symptoms of a heart attack. These may include: · Chest pain or pressure, or a strange feeling in the chest. 
· Sweating. · Shortness of breath. · Nausea or vomiting. · Pain, pressure, or a strange feeling in the back, neck, jaw, or upper belly or in one or both shoulders or arms. · Lightheadedness or sudden weakness. · A fast or irregular heartbeat. If you have symptoms of a heart attack: After you call 911, the  may tell you to chew 1 adult-strength or 2 to 4 low-dose aspirin. Wait for an ambulance. Do not try to drive yourself. Follow-up care is a key part of your treatment and safety. Be sure to make and go to all appointments, and call your doctor if you are having problems. It's also a good idea to know your test results and keep a list of the medicines you take. Where can you learn more? Go to http://freeman.info/. Enter T174 in the search box to learn more about \"Learning About Heart Failure Zones. \" Current as of: April 9, 2019 Content Version: 12.2 © 5056-8075 [employer]. Care instructions adapted under license by [employer] (which disclaims liability or warranty for this information). If you have questions about a medical condition or this instruction, always ask your healthcare professional. [address] any warranty or liability for your use of this information. Stopping Smoking: Care Instructions Your Care Instructions Cigarette smokers crave the nicotine in cigarettes. Giving it up is much harder than simply changing a habit. Your body has to stop craving the nicotine. It is hard to quit, but you can do it. There are many tools that people use to quit smoking. You may find that combining tools works best for you. There are several steps to quitting. First you get ready to quit. Then you get support to help you. After that, you learn new skills and behaviors to become a nonsmoker. For many people, a necessary step is getting and using medicine. Your doctor will help you set up the plan that best meets your needs. You may want to attend a smoking cessation program to help you quit smoking. When you choose a program, look for one that has proven success. Ask your doctor for ideas. You will greatly increase your chances of success if you take medicine as well as get counseling or join a cessation program. 
Some of the changes you feel when you first quit tobacco are uncomfortable. Your body will miss the nicotine at first, and you may feel short-tempered and grumpy. You may have trouble sleeping or concentrating. Medicine can help you deal with these symptoms. You may struggle with changing your smoking habits and rituals. The last step is the tricky one: Be prepared for the smoking urge to continue for a time. This is a lot to deal with, but keep at it. You will feel better. Follow-up care is a key part of your treatment and safety.  Be sure to make and go to all appointments, and call your doctor if you are having problems. It's also a good idea to know your test results and keep a list of the medicines you take. How can you care for yourself at home? Ask your family, friends, and coworkers for support. You have a better chance of quitting if you have help and support. Join a support group, such as Nicotine Anonymous, for people who are trying to quit smoking. Consider signing up for a smoking cessation program, such as the American [employer] Association's Freedom from Smoking program. 
Get text messaging support. Go to the website at www.smokefree. gov to sign up for the [facility] program. 
Set a quit date. Pick your date carefully so that it is not right in the middle of a big deadline or stressful time. Once you quit, do not even take a puff. Get rid of all ashtrays and lighters after your last cigarette. Clean your house and your clothes so that they do not smell of smoke. Learn how to be a nonsmoker. Think about ways you can avoid those things that make you reach for a cigarette. Avoid situations that put you at greatest risk for smoking. For some people, it is hard to have a drink with friends without smoking. For others, they might skip a coffee break with coworkers who smoke. Change your daily routine. Take a different route to work or eat a meal in a different place. Cut down on stress. Calm yourself or release tension by doing an activity you enjoy, such as reading a book, taking a hot bath, or gardening. Talk to your doctor or pharmacist about nicotine replacement therapy, which replaces the nicotine in your body. You still get nicotine but you do not use tobacco. Nicotine replacement products help you slowly reduce the amount of nicotine you need. These products come in several forms, many of them available over-the-counter: 
Nicotine patches Nicotine gum and lozenges Nicotine inhaler Ask your doctor about bupropion (Wellbutrin) or varenicline (Chantix), which are prescription medicines. They do not contain nicotine. They help you by reducing withdrawal symptoms, such as stress and anxiety. Some people find hypnosis, acupuncture, and massage helpful for ending the smoking habit. Eat a healthy diet and get regular exercise. Having healthy habits will help your body move past its craving for nicotine. Be prepared to keep trying. Most people are not successful the first few times they try to quit. Do not get mad at yourself if you smoke again. Make a list of things you learned and think about when you want to try again, such as next week, next month, or next year. Where can you learn more? Go to http://erik-dianna.info/. Enter U496 in the search box to learn more about \"Stopping Smoking: Care Instructions. \" Current as of: September 26, 2018 Content Version: 12.2 © 2015-1356 [employer]. Care instructions adapted under license by [employer] (which disclaims liability or warranty for this information). If you have questions about a medical condition or this instruction, always ask your healthcare professional. [place] [address] any warranty or liability for your use of this information. Low Sodium Diet (2,000 Milligram): Care Instructions Your Care Instructions Too much sodium causes your body to hold on to extra water. This can raise your blood pressure and force your heart and kidneys to work harder. In very serious cases, this could cause you to be put in the hospital. It might even be life-threatening. By limiting sodium, you will feel better and lower your risk of serious problems. The most common source of sodium is salt. People get most of the salt in their diet from canned, prepared, and packaged foods. Fast food and restaurant meals also are very high in sodium.  Your doctor will probably limit your sodium to less than 2,000 milligrams (mg) a day. This limit counts all the sodium in prepared and packaged foods and any salt you add to your food. Follow-up care is a key part of your treatment and safety. Be sure to make and go to all appointments, and call your doctor if you are having problems. It's also a good idea to know your test results and keep a list of the medicines you take. How can you care for yourself at home? Read food labels · Read labels on cans and food packages. The labels tell you how much sodium is in each serving. Make sure that you look at the serving size. If you eat more than the serving size, you have eaten more sodium. · Food labels also tell you the Percent Daily Value for sodium. Choose products with low Percent Daily Values for sodium. · Be aware that sodium can come in forms other than salt, including monosodium glutamate (MSG), sodium citrate, and sodium bicarbonate (baking soda). MSG is often added to Asian food. When you eat out, you can sometimes ask for food without MSG or added salt. Buy low-sodium foods · Buy foods that are labeled \"unsalted\" (no salt added), \"sodium-free\" (less than 5 mg of sodium per serving), or \"low-sodium\" (less than 140 mg of sodium per serving). Foods labeled \"reduced-sodium\" and \"light sodium\" may still have too much sodium. Be sure to read the label to see how much sodium you are getting. · Buy fresh vegetables, or frozen vegetables without added sauces. Buy low-sodium versions of canned vegetables, soups, and other canned goods. Prepare low-sodium meals · Cut back on the amount of salt you use in cooking. This will help you adjust to the taste. Do not add salt after cooking. One teaspoon of salt has about 2,300 mg of sodium. · Take the salt shaker off the table. · Flavor your food with garlic, lemon juice, onion, vinegar, herbs, and spices.  Do not use soy sauce, lite soy sauce, steak sauce, onion salt, garlic salt, celery salt, mustard, or ketchup on your food. · Use low-sodium salad dressings, sauces, and ketchup. Or make your own salad dressings and sauces without adding salt. · Use less salt (or none) when recipes call for it. You can often use half the salt a recipe calls for without losing flavor. Other foods such as rice, pasta, and grains do not need added salt. · Rinse canned vegetables, and cook them in fresh water. This removes somebut not allof the salt. · Avoid water that is naturally high in sodium or that has been treated with water softeners, which add sodium. Call your local water company to find out the sodium content of your water supply. If you buy bottled water, read the label and choose a sodium-free brand. Avoid high-sodium foods · Avoid eating: 
? Smoked, cured, salted, and canned meat, fish, and poultry. ? Ham, stone, hot dogs, and luncheon meats. ? Regular, hard, and processed cheese and regular peanut butter. ? Crackers with salted tops, and other salted snack foods such as pretzels, chips, and salted popcorn. ? Frozen prepared meals, unless labeled low-sodium. ? Canned and dried soups, broths, and bouillon, unless labeled sodium-free or low-sodium. ? Canned vegetables, unless labeled sodium-free or low-sodium. ? Western Suha fries, pizza, tacos, and other fast foods. ? Pickles, olives, ketchup, and other condiments, especially soy sauce, unless labeled sodium-free or low-sodium. Where can you learn more? Go to http://erik-dianna.info/. Enter R916 in the search box to learn more about \"Low Sodium Diet (2,000 Milligram): Care Instructions. \" Current as of: November 7, 2018 Content Version: 12.2 © 9784-3965 [employer]. Care instructions adapted under license by [employer] (which disclaims liability or warranty for this information).  If you have questions about a medical condition or this instruction, always ask your healthcare professional. [place] [address] any warranty or liability for your use of this information.

## 2020-03-13 NOTE — TELEPHONE ENCOUNTER
Pt called and needs to speak with you regarding the Anoro. Pt needs new script faxed with 3 refills.   Fax# [phone]

## 2020-03-24 NOTE — PROGRESS NOTES
Patient has graduated from the Transitions of Care Coordination  program on 3/24/2020. Patient/family has the ability to self-manage at this time Care management goals have been completed. Patient was not referred to the [facility] team for further management. Patient has Care Transition Nurse's contact information for any further questions, concerns, or needs.   Patients upcoming visits:    Future Appointments   Date Time Provider Shelley Rucker   5/11/2020 11:45 AM PACEMAKER, [facility] SCHED   5/26/2020  9:40 AM Maribel Tyler MD [facility] SCHED   6/3/2020  1:00 PM Olivia Garcia MD [address]   6/10/2020 10:55 AM Arthur Campbell NP [facility] SCHED   8/19/2020  9:00 AM Alexandrea Hebert MD [address] [facility]

## 2020-06-10 PROBLEM — F17.210 CONTINUOUS DEPENDENCE ON CIGARETTE SMOKING: Status: ACTIVE | Noted: 2020-01-01

## 2020-06-10 NOTE — PATIENT INSTRUCTIONS
Medication changes: 
 
Restart taking Entresto at a lower dose 49/51mg twice daily- you can take 1/2 tablet of the 97/103mg tablet to equal the 49/51 dose Restart taking the metoprolol (toprol XL)  at 25mg daily - you can take 1/2 tablet of the 50mg tablet to equal to 25mg dose Resume the rest of your home medications and contact your PCP, Jamie Brown MD and endocrinologist, Dr. Ginnie Ormond Please take this to your pharmacy to notify them of the change in medications. Testing Ordered: 
 
[facility] will notify you of any abnormal results ECHO and EKG - scheduling will assist with scheduling these test the day of [facility] Other Recommendations:  
  
Ensure your drinking an adequate amount of water with a goal of 6-8 eight ounce glasses (1.5-2 liters) of fluid daily. Your urine should be clear and light yellow straw colored. If your blood pressure begins to consistently run below 90/60 and/or you begin to experience dizziness or lightheadedness, please contact the Jennifer Jon at [phone]. Follow up with Dr. Jessica Gordon in 2 weeks with Jennifer Jon in office Please monitor your blood pressures daily prior to medications and 2 hours after taking medications. Bring a written record of your blood pressures to your next appointment. Please monitor your weights daily upon waking and after using the bathroom. Keep a written records of your weights and bring to your next appointment. If you have a weight gain of 3 or more pounds overnight OR 5 or more pounds in one week please contact our office. Thank you for allowing us the privilege of being a part of your healthcare team! Please do not hesitate to contact our office at [phone] with any questions or concerns. Heart Failure Patients and COVID-19 March 31, 2020 in [address]. A Statement from the [address] The Heart Failure Society of [facility] wants to ensure that heart failure patients are appropriately informed during the novel Coronavirus COVID-19 pandemic. COVID-19 symptoms vary among infected people and can include cough, fever, shortness of breath, muscle aches, profound fatigue, loss of sense of smell and taste, and diarrhea. Not every infected person will have symptoms which is why social distancing is imperative. Most people have only mild symptoms, but others have severe symptoms that require hospitalization and occasionally intensive care. Because you are a patient with a heart failure, you are at higher risk of getting very sick if you contract Coronavirus and, therefore, it is important to practice good hand hygiene, social distancing, and staying at home as much as possible. If you are experiencing symptoms of COVID-19, please call your primary healthcare clinician. For your safety and the safety of others, and to reduce potential exposures to the Coronavirus, please do not go to an urgent care clinic or emergency room for non-urgent or non-emergency issues unless you have been instructed to do so by your primary healthcare clinician. However, if you have life-threatening symptoms like difficulty breathing, call 911. We want to reduce the spread of the Coronavirus as much as possible and make sure our healthcare resources are not overwhelmed with non-urgent cases. You may have noticed that your healthcare clinicians have converted your in-person appointments to telephone or video calls, and some hospitals are not allowing visitors. The goal is to keep patients from shantel the Coronavirus and to limit the number of healthcare workers in the hospital. If you are sick and need to be seen or get lab testing, you should still do so; otherwise, you can help by 1. Learning how to use your smartphone or computer to participate in telehealth video visits 2. Keeping track of missed visits and studies and working with your team to reschedule them once social distancing measures are relaxed Also, do not stop any of your medications unless instructed by your healthcare team to do so. It is important that you have an adequate supply of your heart failure medications and that you request extended duration of supplies and refills from your providers during these times. We have a lot to learn about COVID-19 and currently there are several ongoing clinical trials to discover therapies that might help treat the infection and vaccines that can prevent the infection. The [facility] and other scientific institutions are working hard, with our patients in mind, to get through this pandemic as quickly as possible. Finally, we recommend that you get your information from trusted, professional healthcare sources including national societies like the [employer], federal agencies like the [employer], and your local hospitals and health departments. Please access updated patient information on the [address] [facility] [phone]) [facility]. We wish you safety and health during this challenging time. Learning About Heart Failure Zones What are heart failure zones? Heart failure zones give you an easy way to see changes in your heart failure symptoms. They also tell you when you need to get help. Check every day to see which zone you are in. Green zone. You are doing well. This is where you want to be. · Your weight is stable. It's not going up or down. · You breathe easily. · You are sleeping well. You are able to lie flat without shortness of breath. · You can do your usual activities. Yellow zone. Be careful. Your symptoms are changing. Call your doctor. · You have new or increased shortness of breath. · You are dizzy or lightheaded, or you feel like you may faint.  
· You have sudden weight gain, such as more than 2 to 3 pounds in a day or 5 pounds in a week. (Your doctor may suggest a different range of weight gain.) · You have increased swelling in your legs, ankles, or feet. · You are so tired or weak that you can't do your usual activities. · You are not sleeping well. Shortness of breath wakes you up at night. You need extra pillows. Red zone. This is an emergency. Call 911. You have symptoms of sudden heart failure. For example: · You have severe trouble breathing. · You cough up pink, foamy mucus. · You have a new irregular or fast heartbeat. You have symptoms of a heart attack. These may include: · Chest pain or pressure, or a strange feeling in the chest. 
· Sweating. · Shortness of breath. · Nausea or vomiting. · Pain, pressure, or a strange feeling in the back, neck, jaw, or upper belly or in one or both shoulders or arms. · Lightheadedness or sudden weakness. · A fast or irregular heartbeat. If you have symptoms of a heart attack: After you call 911, the  may tell you to chew 1 adult-strength or 2 to 4 low-dose aspirin. Wait for an ambulance. Do not try to drive yourself. Follow-up care is a key part of your treatment and safety. Be sure to make and go to all appointments, and call your doctor if you are having problems. It's also a good idea to know your test results and keep a list of the medicines you take. Low Sodium Diet (2,000 Milligram): Care Instructions Your Care Instructions Too much sodium causes your body to hold on to extra water. This can raise your blood pressure and force your heart and kidneys to work harder. In very serious cases, this could cause you to be put in the hospital. It might even be life-threatening. By limiting sodium, you will feel better and lower your risk of serious problems. The most common source of sodium is salt. People get most of the salt in their diet from canned, prepared, and packaged foods.  Fast food and restaurant meals also are very high in sodium. Your doctor will probably limit your sodium to less than 2,000 milligrams (mg) a day. This limit counts all the sodium in prepared and packaged foods and any salt you add to your food. Follow-up care is a key part of your treatment and safety. Be sure to make and go to all appointments, and call your doctor if you are having problems. It's also a good idea to know your test results and keep a list of the medicines you take. How can you care for yourself at home? Read food labels · Read labels on cans and food packages. The labels tell you how much sodium is in each serving. Make sure that you look at the serving size. If you eat more than the serving size, you have eaten more sodium. · Food labels also tell you the Percent Daily Value for sodium. Choose products with low Percent Daily Values for sodium. · Be aware that sodium can come in forms other than salt, including monosodium glutamate (MSG), sodium citrate, and sodium bicarbonate (baking soda). MSG is often added to Asian food. When you eat out, you can sometimes ask for food without MSG or added salt. Buy low-sodium foods · Buy foods that are labeled \"unsalted\" (no salt added), \"sodium-free\" (less than 5 mg of sodium per serving), or \"low-sodium\" (less than 140 mg of sodium per serving). Foods labeled \"reduced-sodium\" and \"light sodium\" may still have too much sodium. Be sure to read the label to see how much sodium you are getting. · Buy fresh vegetables, or frozen vegetables without added sauces. Buy low-sodium versions of canned vegetables, soups, and other canned goods. Prepare low-sodium meals · Cut back on the amount of salt you use in cooking. This will help you adjust to the taste. Do not add salt after cooking. One teaspoon of salt has about 2,300 mg of sodium. · Take the salt shaker off the table.  
· Flavor your food with garlic, lemon juice, onion, vinegar, herbs, and spices. Do not use soy sauce, lite soy sauce, steak sauce, onion salt, garlic salt, celery salt, mustard, or ketchup on your food. · Use low-sodium salad dressings, sauces, and ketchup. Or make your own salad dressings and sauces without adding salt. · Use less salt (or none) when recipes call for it. You can often use half the salt a recipe calls for without losing flavor. Other foods such as rice, pasta, and grains do not need added salt. · Rinse canned vegetables, and cook them in fresh water. This removes somebut not allof the salt. · Avoid water that is naturally high in sodium or that has been treated with water softeners, which add sodium. Call your local water company to find out the sodium content of your water supply. If you buy bottled water, read the label and choose a sodium-free brand. Avoid high-sodium foods · Avoid eating: 
? Smoked, cured, salted, and canned meat, fish, and poultry. ? Ham, stone, hot dogs, and luncheon meats. ? Regular, hard, and processed cheese and regular peanut butter. ? Crackers with salted tops, and other salted snack foods such as pretzels, chips, and salted popcorn. ? Frozen prepared meals, unless labeled low-sodium. ? Canned and dried soups, broths, and bouillon, unless labeled sodium-free or low-sodium. ? Canned vegetables, unless labeled sodium-free or low-sodium. ? [employer] fries, pizza, tacos, and other fast foods. ? Pickles, olives, ketchup, and other condiments, especially soy sauce, unless labeled sodium-free or low-sodium. Where can you learn more? Go to http://erik-dianna.info/ Enter A951 in the search box to learn more about \"Low Sodium Diet (2,000 Milligram): Care Instructions. \" Current as of: August 22, 2019               Content Version: 12.5 © 4449-7039 [employer]. Care instructions adapted under license by [employer] (which disclaims liability or warranty for this information).  If you have questions about a medical condition or this instruction, always ask your healthcare professional. [place] [address] any warranty or liability for your use of this information.

## 2020-06-10 NOTE — PROGRESS NOTES
[address] [facility] in [place], [place] Heart Failure Clinic Note Patient name: Melody Suárez. Patient : 1950 Patient MRN: [id number] Date of service: 06/10/20 Primary care physician: Demetrice Wesley MD 
Primary cardiologist: Dr. Kacie Graham CHIEF COMPLAINT: 
Chief Complaint Patient presents with  CHF PLAN: 
Continue current medical therapy for heart failure Resume toprol XL at lower dose of 25mg daily Resume Entresto at lower dose of 49/51mg twice daily Cannot tolerate aldactone due to hyperkalemia; not interested in taking veltassa Continue Torsemide 10mg prn for weight gain or edema Encourage albuterol inhaler use prn for SOB/wheezing Continue ASA, plavix and statin for CAD  
D/C norvasc 2.5mg Stopped amiodarone due to Hyperthyroidism, check thyroid profile every 3 months and PFT/DLCO every year ICD interrogation last check (19), follow-up with Dr. Kristina Roman ECHO  19- LVEF 21-25%, LVGH, mild MR, mild TR, mild pulm HTN, est PA 45mmHg, LVIDd 5.32cm, Tapse 2.38cm Schedule repeat ECHO and EKG in 2020 Watchman aborted due to thrombus along coumadin ridge, not taking eliquis 5mg d/t to cost, only on ASA 81mg- f/u with Dr. Kristina Roman Labs today: CBC, CMP, pro-NT-BNP, mag, Vitamin D, HgbA1C-may adjust medications pending lab rsults Reinforced low salt diet, not adhering to low Na+/CHO diet Reinforced fluid restriction to 6 x 8oz glasses per day Discussed tobacco cessation at length, currently smoking 1ppd Advanced care plan present on the chart Counseled to obtain home scale and arm blood pressure cuff Document in diary BP/HR before and 2 hours after taking medications Discussed importance of daily weights, record weights and bring to next clinic visit Uncontrolled diabetes, HGBA1c 11.4 on (2020) stopped taking medications 2 months ago, check HgbA1c, recommend follow up with Dr. Yoseph Vaughn  
flu vaccination current Recommend  pneumonia vaccine every 5 years Follow up with Endocrinologist, Dr. Shmuel Mcmahon, for hyperthyroidism, missed appointment on (11/15/19) Patient declines vascular surgery for PAD Follow-up with primary cardiologist, Dr. Jeanette Fleming (8/12/20) Follow-up with EP cardiologist, Dr. Cash Hope (8/19/20) Follow-up with PCP, Dr. Jacqueline Jesus, for diabetes management; missed last appointment (5/14/20) Patient declined referral to pulmonary for COPD and sleep study Return to [facility] in 2-3 weeks with Dr. Sheila Kelly IMPRESSION: 
Chronic systolic heart failure Stage C, NYHA class II symptoms Coronary artery disease S/p impella assisted high risk PCI with MIKE to 100% p-RCA and 70% LM 2/9/17 by Dr. Flory Pino Ischemic cardiomyopathy, LVEF 25% HTN 
PAD not amenable to PTCA Incomplete LBBB 
CKD3 KELLY COPD S/p GI bleed and colorectal surgery Medical noncompliance Claudication PAD Hepatitis C 
 
CARDIAC IMAGING: 
Echo (7/11/18) LVEF 25%, akinesis of basal-mid inferior wall. PA pressure 42mmHg. ECHO (7/11/19) 21-25%, LVGH, mild MR, mild TR, mild pulm HTN, est PA 45mmHg, LVIDd 5.32cm, Tapse 2.38cm ICD interrogation (12/6/18) no events, normal device function, good batteries HEMODYNAMICS: 
RHC not done CPEST not done 6MW not done HISTORY OF PRESENT ILLNESS: 
I had the pleasure of seeing Kiran Hurt in [address] [facility] at [address] [place] [address] in [place]. Briefly, Kiran Hurt is a 81 y.o. male with h/o CAD s/p PCI of LM,  of RCA, ischemic cardiomyopathy with LVEF 96%, s/p AICD complicated by  PAF (amiodarone), Hep C, GI bleed (s/p colorectal surgery), current tobacco abuse and PAD. INTERVAL HISTORY: 
Today, Mr. Zoila Galvez presents for routine clinic visit.   2 months ago he stopped doing blood glucose checks, daily weights, and BP monitoring and taking all medications except for the Anoro Ellipta inhaler because he could not breathe at night. He has missed appointments with his PCP and endocrinology. He continues to smoke 1/2-1pack of cigarettes daily due to increased stress from 2020. He has been following social distancing and staying at home. Denies symptoms of presyncope, syncope, lightheadedness, dizziness, orthopnea, PND, nocturia, CP, palpitations, or ICD has not fired. He reports NAVARRO with fast walking as the only time he is dyspneic or while doing yard work. Patient denies symptoms of volume overload, although his has significant abdominal fat he attributes to overeating. His weight lower today at 191lbs. He was being worked up for [employer], procedure was aborted due to thrombus along the coumadin ridge. He is not taking eliquis d/t to cost, only on ASA 81mg. Patient denies other cardiac symptoms such as chest pain. Positive for claudication. Has been referred to Dr. Bert Plasencia for PAD/PVD and has decided not to pursue surgical intervention for his PAD at this time. Patient is not compliant with fluid restriction or always taking medications as prescribed. Discussed extensively the importance of medication compliance and smoking cessation. REVIEW OF SYSTEMS: 
General: Denies fever, night sweats. Ear, nose and throat: Denies difficulty hearing, sinus problems, runny nose, post-nasal drip, ringing in ears, mouth sores, loose teeth, ear pain, nosebleeds, sore throate, facial pain or numbess Cardiovascular: see above in the interval history Respiratory: +non productive congested cough, wheezing at night Denies hemoptysis. Gastrointestinal: Denies heartburn, constipation, intolerance to certain foods, diarrhea, abdominal pain, nausea, vomiting, difficulty swallowing, blood in stool Kidney and bladder: Denies painful urination, frequent urination, urgency, prostate problems and impotence Musculoskeletal: Denies joint pain, muscle weakness, +claudication Skin and hair: Denies change in existing skin lesions, hair loss or increase, breast changes PHYSICAL EXAM: 
Vital signs:  
 
Visit Vitals /72 (BP 1 Location: Right arm, BP Patient Position: Sitting) Pulse 97 Temp 99.1 °F (37.3 °C) (Oral) Resp 20 Ht 5' 10\" (1.778 m) Wt 191 lb 3.2 oz (86.7 kg) SpO2 97% BMI 27.43 kg/m² General: Patient is well developed, well-nourished in no acute distress HEENT: Normocephalic and atraumatic. No scleral icterus. Pupils are equal, round and reactive to light and accomodation. No conjunctival injection. Oropharynx is clear. Neck: Supple. No evidence of thyroid enlargements or lymphadenopathy. JVD: Cannot be appreciated Lungs: scattered wheezing, congested cough Heart: Regular rate and rhythm with normal S1 and S2. No murmurs, gallops or rubs. ICD in RU chest 
Abdomen: Soft, obese, rotund, no mass or tenderness. No organomegaly or hernia. Bowel sounds present. Genitourinary and rectal: deferred Extremities: No cyanosis, clubbing, or edema. flaky BLE, pink/yasir Neurologic: No focal sensory or motor deficits are noted. Grossly intact. Psychiatric: Awake, alert and oriented x 3. Appropriate mood and affect Skin: Warm, dry and well perfused, No lesions, nodules or rashes are noted. PAST MEDICAL HISTORY: 
Past Medical History:  
Diagnosis Date  Arthritis   
 hands/fingers-OSTEO  
 CAD (coronary artery disease)   
 involving native coronary artery of native heart without angina pectoris  Cardiomyopathy ([facility] 01/20/2017 A. Echo (1/19/17):  EF 5-10% with severe GHK,. Mildly dil LA. Mild TR. PASP 46./systemic cardiomyopathy  Chronic kidney disease  Chronic obstructive pulmonary disease ([address]  Chronic renal insufficiency 03/06/2017  Chronic systolic congestive heart failure, NYHA class 3 ([address] 1/8/2019  Claudication ([address] 10/9/2018  Congestive heart failure ([facility]  Diabetes mellitus ([facility] [address] [facility] 3/6/2017 IDDM  Dyslipidemia 3/6/2017 A. FLP (17): Tot 120, , HDL 19, LDL 76 (no Rx).  H/O: GI bleed 3/6/2017  Hepatitis C 3/6/2017  Hypokalemia  ICD (implantable cardioverter-defibrillator) in place  Ill-defined condition   
 flu 2018  Neuropathy 2018  Noncompliance with medication regimen 2018  Paroxysmal atrial fibrillation ([facility] 3/6/2017  Peripheral vascular disease ([facility] 2017 A. RICKY (3/20/17): Right 0.58, Left 0.56.  Seizures ([facility] WITH HEPARIN  
 Tobacco dependence syndrome 10/9/2018  Vitamin D deficiency 2018 PAST SURGICAL HISTORY: 
Past Surgical History:  
Procedure Laterality Date  CARDIAC SURG PROCEDURE UNLIST CORONARY STENTX 3 2 CORONARY, & 1 FEMORAL  
 COLONOSCOPY N/A 2017 COLONOSCOPY performed by Sarah Ndiaye. Anil Romano MD at [facility] ENDOSCOPY  COLONOSCOPY N/A 2018 COLONOSCOPY performed by Sarah Ndiaye. Anil Romano MD at [facility] ENDOSCOPY  COLONOSCOPY N/A 10/22/2019 COLONOSCOPY performed by Fatoumata Ruffin MD at [facility] ENDOSCOPY  
 HX CORONARY STENT PLACEMENT    
 LM, RCA x 2   
 HX ENDOSCOPY  10/22/2019 [facility]   
 HX GI    
 colonoscopy x 3 - last 2017 - polyp  HX IMPLANTABLE CARDIOVERTER DEFIBRILLATOR  HX OTHER SURGICAL    
 HX PACEMAKER    
 AICD  VASCULAR SURGERY PROCEDURE UNLIST  2017 STENT - RIGHT FEMORAL   
 
 
FAMILY HISTORY: 
Family History Problem Relation Age of Onset  Hypertension Mother  Heart Disease Mother MURMUR  
 Cancer Father COLON  
 Colon Cancer Father  Other Sister   
     born totally handicapped/epileptic  Kidney Disease Sister   
      from renal shutdown  Heart Disease Brother  Other Brother ?pancreatic cancer or ?pancreatitis  Cancer Brother PANCREATIC? AND COLON  
 Cancer Maternal Uncle   
     toes and spread  Cancer Paternal Uncle   
     stomach  
 Heart Attack Maternal Grandfather 47  Cancer Paternal Grandfather   
     bone  Cancer Maternal Uncle   
     stomach  Cancer Maternal Uncle   
     lung  No Known Problems Son  No Known Problems Son  Anesth Problems Neg Hx SOCIAL HISTORY: 
Social History Socioeconomic History  Marital status:  Spouse name: Not on file  Number of children: Not on file  Years of education: Not on file  Highest education level: Not on file Tobacco Use  Smoking status: Current Every Day Smoker Packs/day: 1.00 Years: 55.00 Pack years: 55.00  Smokeless tobacco: Never Used  Tobacco comment:    
Substance and Sexual Activity  Alcohol use: Yes Comment: 1 BEER WEEKLY  Drug use: Not Currently  Sexual activity: Yes  
  Partners: Female LABORATORY RESULTS: 
  
No flowsheet data found. Lab Results Component Value Date/Time TSH 5.260 (H) 09/06/2019 01:51 PM  
 TSH 0.129 (L) 07/17/2019 12:00 AM  
 TSH 0.049 (L) 04/10/2019 12:00 AM  
 TSH 1.050 12/06/2018 01:56 PM  
 TSH 3.040 12/04/2017 10:17 AM  
 TSH 2.04 01/22/2017 11:11 AM  
 TSH 1.49 01/18/2017 11:12 PM  
 
 
CURRENT MEDICATIONS: 
 
Current Outpatient Medications:  
  umeclidinium-vilanteroL (Anoro Ellipta) 62.5-25 mcg/actuation inhaler, Take 1 Puff by inhalation daily. , Disp: 1 Inhaler, Rfl: 5 
  amLODIPine (NORVASC) 2.5 mg tablet, Take 1 Tab by mouth daily.  Indications: high blood pressure, Disp: 90 Tab, Rfl: 2 
  potassium chloride (K-DUR, KLOR-CON) 20 mEq tablet, TAKE 1 TABLET BY MOUTH TWICE DAILY, Disp: 60 Tab, Rfl: 3 
  insulin NPH/insulin regular (NOVOLIN 70/30 U-100 INSULIN) 100 unit/mL (70-30) injection, INJECT 40 UNITS SUBCUTANEOUSLY IN THE MORNING AND 20 UNITS IN THE EVENING (Patient taking differently: INJECT 20 UNITS SUBCUTANEOUSLY IN THE MORNING AND 10 UNITS IN THE EVENING), Disp: 10 mL, Rfl: 5 
  sacubitril-valsartan (ENTRESTO) 97 mg/103 mg tablet, Take 1 Tab by mouth two (2) times a day., Disp: , Rfl:  
   cholecalciferol (VITAMIN D3) (5000 Units /125 mcg) capsule, Take 1 Cap by mouth daily. , Disp: 30 Cap, Rfl: 2 
  pen needle, diabetic (NOVOFINE PLUS) 32 gauge x 1/6\" ndle, 1 Each by Does Not Apply route daily. , Disp: 100 Pen Needle, Rfl: 1 
  torsemide (DEMADEX) 20 mg tablet, Take 0.5 Tabs by mouth as needed (for shortness of breath or edema). FYI dose decrease by C. Nicki Mcburney, NP on 9/18/19 (Patient taking differently: Take 10 mg by mouth daily. FYI dose decrease by C. Nicki Mcburney, NP on 9/18/19), Disp: 180 Tab, Rfl: 1 
  metoprolol succinate (TOPROL-XL) 50 mg XL tablet, Take 1 Tab by mouth daily. , Disp: 90 Tab, Rfl: 3 
  atorvastatin (LIPITOR) 40 mg tablet, TAKE ONE TABLET BY MOUTH ONCE DAILY  Indications: excessive fat in the blood, Disp: 90 Tab, Rfl: 3 
  aspirin 81 mg chewable tablet, Take 81 mg by mouth daily. , Disp: , Rfl:  
  Insulin Syringes, Disposable, 1 mL syrg, Pt to take 10 units w/ breakfast and 8 units w/ dinner, Disp: 500 Syringe, Rfl: 1 Thank you for your referral and allowing me to participate in this patient's care. MONICA Gonzalez [id number], [address] [facility] Phone: [phone] Fax: [phone]

## 2020-06-11 NOTE — PROGRESS NOTES
NTproBNP is elevated at 3183, resuming Entresto at 49/51mg should help, continue daily weights, low Na+ diet and fluid restriction 1.5-2 liters. Glucose is elevated at 393 please resume insulin regimen and contact Dr. Akhil Olguin. Vitamin D is low at 26.9, start ergocalciferol [id number] weekly for 12 weeks. Cholesterol is elevated 216, triglycerides elevated 194, HDL is low at 30, LDL is elevated at 147 please resume atorvastatin 40mg QHS and follow up with Dr. Stas Rogers. Continue checking BP daily and record in journal with weights to bring to next clinic visit.

## 2020-06-11 NOTE — TELEPHONE ENCOUNTER
----- Message from Candi San NP sent at 6/11/2020 11:04 AM EDT -----  NTproBNP is elevated at 3183, resuming Entresto at 49/51mg should help, continue daily weights, low Na+ diet and fluid restriction 1.5-2 liters. Glucose is elevated at 393 please resume insulin regimen and contact Dr. Edmar Sams. Vitamin D is low at 26.9, start ergocalciferol [id number] weekly for 12 weeks. Cholesterol is elevated 216, triglycerides elevated 194, HDL is low at 30, LDL is elevated at 147 please resume atorvastatin 40mg QHS and follow up with Dr. Rebecca Putnam. Continue checking BP daily and record in journal with weights to bring to next clinic visit. I called patient, reviewed all information, he states understanding, has no further questions.

## 2020-06-23 NOTE — PROGRESS NOTES
[facility] in [address], [place] Heart Failure Clinic Note Patient name: Shefali Jones Patient : 1950 Patient MRN: [id number] Date of service: 20 Primary care physician: Valeriano Mortensen MD 
Primary cardiologist: Dr. Graciela Vega CHIEF COMPLAINT: 
Chief Complaint Patient presents with  Cardiomyopathy  Shortness of Breath  Follow-up IMPRESSION/PLAN: 
1. Chronic systolic heart failure d/t ICM, Stage C, NYHA Class II, LVEF 20-25% Continue current medical therapy for heart failure Continue current dose of toprol XL 25 mg daily Continue Entresto 97/103 mg, take 0.5 tablet twice daily Cannot tolerate aldactone due to hyperkalemia; not interested in taking veltassa Continue Torsemide 10 mg prn,  pt taking daily Reinforced low salt diet, not adhering to low Na+/CHO diet Reinforced fluid restriction to 6 x 8oz glasses per day Advanced care plan present on the chart Counseled to obtain home scale and arm blood pressure cuff Document in diary BP/HR before and 2 hours after taking medications Discussed importance of daily weights, record weights and bring to next clinic visit Received flu vaccination Recommend  pneumonia vaccine every 5 years 2. CAD s/p Impella assisted hrPCI - MIKE to 100%pRCA and 70% LM - 17 by Dr. Mary Blackwood Continue ASA, plavix and statin 3. HTN Continue entresto, norvasc, and torsemide 4. PAD not amenable to PTCA with intermittent claudication Encourage exercise 5. CKD3 Recent creatine 1.4 6. KELLY Does not wish to undergo a sleep study 7. COPD Encourage albuterol inhaler use for dyspnea/wheezing 8. S/p GI bleed and colorectal surgery 9. Hepatitis C - spontaneous clearance Evaluated by Dr. Augusto Manuel in 2017 
 
10. PAF Watchman aborted due to thrombus along coumadin ridge, not taking eliquis 5mg d/t to cost, only on ASA 81mg- f/u with Dr. Adeline Salamanca Stopped amiodarone due to Hyperthyroidism, check thyroid profile every 3 months and PFT/DLCO every year No recent episodes Not on anticoagulation d/t cost 
 
11. High Risk of SCD - s/p AICD ([facility]) Followed by Dr. Marcellus Win 12. T2DM - Hga1c 11.4 on 2/14/2020 On NPH Recommend dapagliflozin 10 mg daily 13. History of hyperthyroidism TSH 5.26, total T3 89, free T4 1.69 - 9/6/2019 Recheck TFTs today Follow up with Dr. Madelyn Johnson 14. History of tobacco abuse - actively smoking 1.5 ppd Smoking cessation counseling, > 10 minutes Start Wellbutrin  mg BID 15. HLD 
 , HDL 30, ,  = 6/10/2020 Resumed atorvastatin 40 mg daily 16. History of Medication Non-compliance Counseled patient about the importance of medical therapy to prevent cardiac events Encouraged patient to reach out to our office if he has barriers to medication access 17. Depression - PHQ9 Start Wellbutrin  mg BID 
 
EKG 
6/23/2020 Sinus  Rhythm  
-Incomplete left bundle branch block.  
 -Left atrial enlargement.  
 -  Nonspecific T-abnormality. CARDIAC IMAGING: 
Echo (7/11/18) LVEF 25%, akinesis of basal-mid inferior wall. PA pressure 42mmHg. ECHO (7/11/19) 21-25%, LVGH, mild MR, mild TR, mild pulm HTN, est PA 45mmHg, LVIDd 5.32cm, Tapse 2.38cm ICD interrogation (12/6/18) no events, normal device function, good batteries ECHO (6/15/2020)  LVEF 20-25%, trace MR 
 
HEMODYNAMICS: 
RHC not done CPEST not done 6MW not done HISTORY OF PRESENT ILLNESS: 
I had the pleasure of seeing Edmar Polanco in [address] [facility] at [facility] in [place]. Briefly, Edmar Polanco is a 81 y.o. male with h/o CAD s/p PCI of LM,  of RCA, ischemic cardiomyopathy with LVEF 27%, s/p AICD complicated by  PAF (amiodarone), Hep C, GI bleed (s/p colorectal surgery), current tobacco abuse and PAD. INTERVAL HISTORY: 
 Today, Mr. Kvng Knowles presents for routine clinic visit. Several months ago he stopped doing blood glucose checks, daily weights, and BP monitoring and taking all medications except for the Anoro Ellipta inhaler because he could not breathe at night. He has missed appointments with his PCP and endocrinology. He continues to smoke 1.5 packs of cigarettes daily due to increased stress from 2020. He has been following social distancing and staying at home. Denies symptoms of presyncope, syncope, lightheadedness, dizziness, orthopnea, PND, nocturia, CP, and palpitations. His ICD has not fired. He reports NAVARRO with walking or while doing yard work. Patient denies symptoms of volume overload, although his has significant abdominal fat he attributes to overeating. He was being worked up for [employer], procedure was aborted due to thrombus along the coumadin ridge. He is not taking eliquis d/t to cost, only on ASA 81mg. Patient denies other cardiac symptoms such as chest pain. Positive for claudication. Has been referred to Dr. Luisana Arzola for PAD/PVD and has decided not to pursue surgical intervention for his PAD at this time. Patient is not compliant with fluid restriction or always taking medications as prescribed. Discussed extensively the importance of medication compliance and smoking cessation. Mr. Kvng Knowles denies depression but has a PHQ of 9 on screening. Interrogation of [email or web address]) 6/23/2020 No ventricular arrhythmias detected REVIEW OF SYSTEMS: 
General: Denies fever, night sweats. Ear, nose and throat: Denies difficulty hearing, sinus problems, runny nose, post-nasal drip, ringing in ears, mouth sores, loose teeth, ear pain, nosebleeds, sore throate, facial pain or numbess Cardiovascular: see above in the interval history Respiratory: +non productive congested cough, wheezing at night Denies hemoptysis. Gastrointestinal: Denies heartburn, constipation, intolerance to certain foods, diarrhea, abdominal pain, nausea, vomiting, difficulty swallowing, blood in stool Kidney and bladder: Denies painful urination, frequent urination, urgency, prostate problems and impotence Musculoskeletal: Denies joint pain, muscle weakness, +claudication Skin and hair: Denies change in existing skin lesions, hair loss or increase, breast changes PHYSICAL EXAM: 
Vital signs:  
 
Visit Vitals /76 (BP 1 Location: Right arm, BP Patient Position: At rest) Pulse (!) 101 Temp 98.4 °F (36.9 °C) Resp 16 Wt 189 lb 6.4 oz (85.9 kg) SpO2 96% BMI 27.18 kg/m² General: Patient is well developed, well-nourished in no acute distress HEENT: Normocephalic and atraumatic. No scleral icterus. Pupils are equal, round and reactive to light and accomodation. No conjunctival injection. Oropharynx is clear. Neck: Supple. No evidence of thyroid enlargements or lymphadenopathy. JVD: Cannot be appreciated Lungs: Clear to auscultation Heart: Regular rate and rhythm with normal S1 and S2. No murmurs, gallops or rubs. ICD in RU chest 
Abdomen: Soft, obese, rotund, no mass or tenderness. No organomegaly or hernia. Bowel sounds present. Genitourinary and rectal: deferred Extremities: No cyanosis, clubbing, or edema. flaky BLE, pink/yasir Neurologic: No focal sensory or motor deficits are noted. Grossly intact. Psychiatric: Awake, alert and oriented x 3. Appropriate mood and affect Skin: Warm, dry and well perfused, No lesions, nodules or rashes are noted. PAST MEDICAL HISTORY: 
Past Medical History:  
Diagnosis Date  Arthritis   
 hands/fingers-OSTEO  
 CAD (coronary artery disease)   
 involving native coronary artery of native heart without angina pectoris  Cardiomyopathy ([facility] 01/20/2017 A. Echo (1/19/17):  EF 5-10% with severe GHK,. Mildly dil LA. Mild TR. PASP 46./systemic cardiomyopathy  Chronic kidney disease  Chronic obstructive pulmonary disease ([address]  Chronic renal insufficiency 03/06/2017  Chronic systolic congestive heart failure, NYHA class 3 ([address] 1/8/2019  Claudication ([address] 10/9/2018  Congestive heart failure ([address]  Diabetes mellitus ([address] 3/6/2017 IDDM  Dyslipidemia 3/6/2017 A. FLP (1/19/17): Tot 120, , HDL 19, LDL 76 (no Rx).  H/O: GI bleed 3/6/2017  Hepatitis C 3/6/2017  Hypokalemia  ICD (implantable cardioverter-defibrillator) in place  Ill-defined condition   
 flu 1/2018  Neuropathy 11/19/2018  Noncompliance with medication regimen 9/11/2018  Paroxysmal atrial fibrillation ([address] 3/6/2017  Peripheral vascular disease ([facility] [address] 9/18/2017 A. RICKY (3/20/17): Right 0.58, Left 0.56.  Seizures ([address] WITH HEPARIN  
 Tobacco dependence syndrome 10/9/2018  Vitamin D deficiency 6/4/2018 PAST SURGICAL HISTORY: 
Past Surgical History:  
Procedure Laterality Date  CARDIAC SURG PROCEDURE UNLIST CORONARY STENTX 3 2 CORONARY, & 1 FEMORAL  
 COLONOSCOPY N/A 2/17/2017 COLONOSCOPY performed by Mitchell Coombs. Aurelio Olsen MD at [facility] ENDOSCOPY  COLONOSCOPY N/A 2/5/2018 COLONOSCOPY performed by Mitchell Coombs. Aurelio Olsen MD at [facility] ENDOSCOPY  COLONOSCOPY N/A 10/22/2019 COLONOSCOPY performed by Darcy Saleh MD at [facility] ENDOSCOPY  
 HX CORONARY STENT PLACEMENT    
 LM, RCA x 2   
 HX ENDOSCOPY  10/22/2019 [facility]   
 HX GI    
 colonoscopy x 3 - last 2/2017 - polyp  HX IMPLANTABLE CARDIOVERTER DEFIBRILLATOR  HX OTHER SURGICAL    
 HX PACEMAKER    
 AICD  VASCULAR SURGERY PROCEDURE UNLIST  2017 STENT - RIGHT FEMORAL   
 
 
FAMILY HISTORY: 
Family History Problem Relation Age of Onset  Hypertension Mother  Heart Disease Mother MURMUR  
 Cancer Father COLON  
 Colon Cancer Father  Other Sister   
     born totally handicapped/epileptic  Kidney Disease Sister  from renal shutdown  Heart Disease Brother  Other Brother ?pancreatic cancer or ?pancreatitis  Cancer Brother PANCREATIC? AND COLON  
 Cancer Maternal Uncle   
     toes and spread  Cancer Paternal Uncle   
     stomach  
 Heart Attack Maternal Grandfather 47  Cancer Paternal Grandfather   
     bone  Cancer Maternal Uncle   
     stomach  Cancer Maternal Uncle   
     lung  No Known Problems Son  No Known Problems Son  Anesth Problems Neg Hx SOCIAL HISTORY: 
Social History Socioeconomic History  Marital status:  Spouse name: Not on file  Number of children: Not on file  Years of education: Not on file  Highest education level: Not on file Tobacco Use  Smoking status: Current Every Day Smoker Packs/day: 1.00 Years: 55.00 Pack years: 55.00  Smokeless tobacco: Never Used  Tobacco comment:    
Substance and Sexual Activity  Alcohol use: Yes Comment: 1 BEER WEEKLY  Drug use: Not Currently  Sexual activity: Yes  
  Partners: Female LABORATORY RESULTS: 
  
Labs Latest Ref Rng & Units 6/10/2020 WBC 3.4 - 10.8 x10E3/uL 7.2  
RBC 4.14 - 5.80 x10E6/uL 5.07 Hemoglobin 13.0 - 17.7 g/dL 14.3 Hematocrit 37.5 - 51.0 % 41.9 MCV 79 - 97 fL 83 Platelets 989 - 748 N61D5/ Albumin 3.8 - 4.8 g/dL 4.2 Calcium 8.6 - 10.2 mg/dL 9.1 Glucose 65 - 99 mg/dL 393(H) BUN 8 - 27 mg/dL 14 Creatinine 0.76 - 1.27 mg/dL 1.40(H) Sodium 134 - 144 mmol/L 137 Potassium 3.5 - 5.2 mmol/L 3.8 Some recent data might be hidden Lab Results Component Value Date/Time TSH 5.260 (H) 2019 01:51 PM  
 TSH 0.129 (L) 2019 12:00 AM  
 TSH 0.049 (L) 04/10/2019 12:00 AM  
 TSH 1.050 2018 01:56 PM  
 TSH 3.040 2017 10:17 AM  
 TSH 2.04 2017 11:11 AM  
 TSH 1.49 2017 11:12 PM  
 
 
CURRENT MEDICATIONS: 
 
Current Outpatient Medications:   umeclidinium-vilanteroL (Anoro Ellipta) 62.5-25 mcg/actuation inhaler, Take 1 Puff by inhalation daily. , Disp: 1 Inhaler, Rfl: 5 
  pen needle, diabetic (NOVOFINE PLUS) 32 gauge x 1/6\" ndle, 1 Each by Does Not Apply route daily. , Disp: 100 Pen Needle, Rfl: 1 
  aspirin 81 mg chewable tablet, Take 81 mg by mouth daily. , Disp: , Rfl:  
  Insulin Syringes, Disposable, 1 mL syrg, Pt to take 10 units w/ breakfast and 8 units w/ dinner, Disp: 500 Syringe, Rfl: 1   ergocalciferol (ERGOCALCIFEROL) 1,250 mcg (50,000 unit) capsule, Take 1 Cap by mouth every seven (7) days. Indications: vitamin D deficiency (high dose therapy), Disp: 12 Cap, Rfl: 0 
  metoprolol succinate (TOPROL-XL) 50 mg XL tablet, Take 0.5 Tabs by mouth daily. , Disp: 90 Tab, Rfl: 3 
  sacubitriL-valsartan (Entresto) 49-51 mg tab tablet, Take 1 Tab by mouth two (2) times a day., Disp: 180 Tab, Rfl: 0 
  potassium chloride (K-DUR, KLOR-CON) 20 mEq tablet, TAKE 1 TABLET BY MOUTH TWICE DAILY, Disp: 60 Tab, Rfl: 3 
  insulin NPH/insulin regular (NOVOLIN 70/30 U-100 INSULIN) 100 unit/mL (70-30) injection, INJECT 40 UNITS SUBCUTANEOUSLY IN THE MORNING AND 20 UNITS IN THE EVENING (Patient taking differently: INJECT 10 UNITS SUBCUTANEOUSLY IN THE MORNING AND 8 UNITS IN THE EVENING), Disp: 10 mL, Rfl: 5 
  torsemide (DEMADEX) 20 mg tablet, Take 0.5 Tabs by mouth as needed (for shortness of breath or edema). FYI dose decrease by JING Cotter NP on 9/18/19 (Patient taking differently: Take 10 mg by mouth daily. FYI dose decrease by JING Cotter NP on 9/18/19), Disp: 180 Tab, Rfl: 1 
  atorvastatin (LIPITOR) 40 mg tablet, TAKE ONE TABLET BY MOUTH ONCE DAILY  Indications: excessive fat in the blood, Disp: 90 Tab, Rfl: 3 Thank you for your referral and allowing me to participate in this patient's care. MD Jennifer Lugo [address] [facility], [address] Phone: [phone] Fax: [phone]

## 2020-06-23 NOTE — LETTER
6/23/2020 2:54 PM 
 
Patient:  Susan Stratton. YOB: 1950 Date of Visit: 6/23/2020 Dear Elaina Aquino MD 
[address] [facility] [address] VIA In Basket Ramya Gayle MD 
[address] [facility] [address] [facility] [address] [id number] VIA In Basket Donna Logan MD 
[address] [place] [address] [address] [place] [address] VIA In Basket: Thank you for referring Mr. Joana Broussard to me for evaluation/treatment. Below are the relevant portions of my assessment and plan of care. If you have questions, please do not hesitate to call me. I look forward to following Mr. Kvng Knowles along with you. Sincerely, Calvin Russell MD

## 2020-06-23 NOTE — PATIENT INSTRUCTIONS
Medication changes: 
 
Start Wellbutrin  mg, 1 tablet twice daily Please take this to your pharmacy to notify them of the change in medications. Testing Ordered: 
 
Check TSH, free T3, free T4 Other Recommendations:  
  
Ensure your drinking an adequate amount of water with a goal of 6-8 eight ounce glasses (1.5-2 liters) of fluid daily. Your urine should be clear and light yellow straw colored. If your blood pressure begins to consistently run below 90/60 and/or you begin to experience dizziness or lightheadedness, [facility] at [phone]. Follow up in 6 weeks with [facility] [address] Please monitor your blood pressures daily prior to medications and 2 hours after taking medications. Bring a written record of your blood pressures to your next appointment. Please monitor your weights daily upon waking and after using the bathroom. Keep a written records of your weights and bring to your next appointment. If you have a weight gain of 3 or more pounds overnight OR 5 or more pounds in one week please contact our office. Thank you for allowing us the privilege of being a part of your healthcare team! Please do not hesitate to contact our office at [phone] with any questions or concerns. Depression and Chronic Disease: Care Instructions Your Care Instructions A chronic disease is one that you have for a long time. Some chronic diseases can be controlled, but they usually cannot be cured. Depression is common in people with chronic diseases, but it often goes unnoticed. Many people have concerns about seeking treatment for a mental health problem. You may think it's a sign of weakness, or you don't want people to know about it. It's important to overcome these reasons for not seeking treatment. Treating depression or anxiety is good for your health. Follow-up care is a key part of your treatment and safety. Be sure to make and go to all appointments, and call your doctor if you are having problems. It's also a good idea to know your test results and keep a list of the medicines you take. How can you care for yourself at home? Watch for symptoms of depression The symptoms of depression are often subtle at first. You may think they are caused by your disease rather than depression. Or you may think it is normal to be depressed when you have a chronic disease. If you are depressed you may: · Feel sad or hopeless. · Feel guilty or worthless. · Not enjoy the things you used to enjoy. · Feel hopeless, as though life is not worth living. · Have trouble thinking or remembering. · Have low energy, and you may not eat or sleep well. · Pull away from others. · Think often about death or killing yourself. (Keep the numbers for these national suicide hotlines: 7-854-741-TALK [1-596.835.7552] and 2-956-QPVOFCE [1-489.567.9929]. ) Get treatment By treating your depression, you can feel more hopeful and have more energy. If you feel better, you may take better care of yourself, so your health may improve. · Talk to your doctor if you have any changes in mood during treatment for your disease. · Ask your doctor for help. Counseling, antidepressant medicine, or a combination of the two can help most people with depression. Often a combination works best. Counseling can also help you cope with having a chronic disease. When should you call for help? [id number] anytime you think you may need emergency care. For example, call if: 
· You feel like hurting yourself or someone else. · Someone you know has depression and is about to attempt or is attempting suicide. Call your doctor now or seek immediate medical care if: 
· You hear voices. · Someone you know has depression and: 
? Starts to give away his or her possessions. ? Uses illegal drugs or drinks alcohol heavily. ? Talks or writes about death, including writing suicide notes or talking about guns, knives, or pills. ? Starts to spend a lot of time alone. ? Acts very aggressively or suddenly appears calm. Watch closely for changes in your health, and be sure to contact your doctor if: 
· You do not get better as expected. Where can you learn more? Go to http://erik-dianna.info/ Enter D319 in the search box to learn more about \"Depression and Chronic Disease: Care Instructions. \" Current as of: January 31, 2020               Content Version: 12.5 © 3046-0940 Healthwise, [employer]. Care instructions adapted under license by [employer] (which disclaims liability or warranty for this information). If you have questions about a medical condition or this instruction, always ask your healthcare professional. [address] any warranty or liability for your use of this information.

## 2020-07-20 NOTE — TELEPHONE ENCOUNTER
Patient called with back pain under right shoulder blade for a month, chest discomfort, \"sweats\", vomiting, diarrhea, cough. He states he has a virtual appt with his PCP in 2 days. I advised he needs to go to urgent care today to be COVID tested. He states he will go today.

## 2020-07-22 PROBLEM — E87.6 HYPOKALEMIA: Status: RESOLVED | Noted: 2018-01-11 | Resolved: 2020-01-01

## 2020-07-22 PROBLEM — Z86.19 HISTORY OF HEPATITIS C: Status: ACTIVE | Noted: 2017-03-06

## 2020-07-22 PROBLEM — D49.1 NEOPLASM OF LUNG: Status: ACTIVE | Noted: 2020-01-01

## 2020-07-22 PROBLEM — D75.829 HIT (HEPARIN-INDUCED THROMBOCYTOPENIA): Status: ACTIVE | Noted: 2020-01-01

## 2020-07-22 PROBLEM — R06.02 SHORTNESS OF BREATH: Status: ACTIVE | Noted: 2020-01-01

## 2020-07-22 PROBLEM — E56.9 VITAMIN DEFICIENCY: Status: RESOLVED | Noted: 2019-01-29 | Resolved: 2020-01-01

## 2020-07-22 PROBLEM — C78.7 LIVER METASTASES (HCC): Status: ACTIVE | Noted: 2020-01-01

## 2020-07-22 NOTE — ACP (ADVANCE CARE PLANNING)
7/22/2020  1:39 PM    Advance Care Planning     Advance Care Planning Activator (Inpatient)  Conversation Note      Date of ACP Conversation: 07/22/20     Conversation Conducted with:   Patient with capacity    ACP Activator: [address] :    Current Designated Health Care Decision Maker:   Primary Decision Maker: Era Kaminski - [phone]  . Care Preferences    Ventilation: \"If you were in your present state of health and suddenly became very ill and were unable to breathe on your own, what would your preference be about the use of a ventilator (breathing machine) if it were available to you? \"      If patient would desire the use of a ventilator? no    \"If your health worsens and it becomes clear that your chance of recovery is unlikely, what would your preference be about the use of a ventilator (breathing machine) if it were available to you? \"     Would the patient desire the use of a ventilator (breathing machine)? NO      Resuscitation  \"CPR works best to restart the heart when there is a sudden event, like a heart attack, in someone who is otherwise healthy. Unfortunately, CPR does not typically restart the heart for people who have serious health conditions or who are very sick. \"    \"In the event your heart stopped as a result of an underlying serious health condition, would you want attempts to be made to restart your heart (answer \"yes\" for attempt to resuscitate) or would you prefer a natural death (answer \"no\" for do not attempt to resuscitate)? \" \"NO CPR, a couple of shocks and medications. \"      [x] Yes  [] No   Educated Patient regarding differences between Advance Directives and portable DNR orders.     Length of ACP Conversation in minutes:  15  Conversation Outcomes:  [x] ACP discussion completed  [] Existing advance directive reviewed with patient; no changes to patient's previously recorded wishes     [] New Advance Directive completed   [] Portable Do Not Resuscitate prepared for Provider review and signature  [] POLST/POST/MOLST/MOST prepared for Provider review and signature      Follow-up plan:    [x] Schedule follow-up conversation to continue planning. Patient answered no when asked if he had AD. Patient has one in chart and DNR.   [] Referred individual to Provider for additional questions/concerns   [] Advised patient/agent/surrogate to review completed ACP document and update if needed with changes in condition, patient preferences or care setting     [] This note routed to one or more involved healthcare providers

## 2020-07-22 NOTE — PROGRESS NOTES
1510 Pt placed in [address] [facility] [facility] for liver BX. Pt A&0x3 has had nothing to eat or drink today. Pt ASA 2 Airway 2 Lungs coarse and wheezing bi lat. He just received a neb treatment in ER prior to coming to [address] [facility]. S1 and S2 Sinus Tach. 110. 1515 Dr Bernardo Arriaga in to obtain consent for liver Bx with sedation. Time out performed at 1520 and sedation started. Pathology present in room and Dr. Bernardo Arriaga obtained samples. Total sedation was Versed 2 mg and Fent 50 mcg. 1540 gauze and Tegaderm placed over rt upper abdomen. 65 Pt wife brought back to [address] [facility] and Dr. Bernardo Arriaga explained finding to pt and wife. 1615 TRANSFER - OUT REPORT:    Verbal report given to Yessenia Pichardo RN(name) on [facility].  being transferred to [facility](unit) for routine progression of care       Report consisted of patients Situation, Background, Assessment and   Recommendations(SBAR). Information from the following report(s) SBAR and Procedure Summary was reviewed with the receiving nurse. Lines:   Peripheral IV 07/22/20 Right Antecubital (Active)   Site Assessment Clean, dry, & intact 07/22/20 1355   Phlebitis Assessment 0 07/22/20 1355   Infiltration Assessment 0 07/22/20 1355   Dressing Status Clean, dry, & intact 07/22/20 1355   Dressing Type Transparent 07/22/20 1355   Hub Color/Line Status Pink 07/22/20 1355        Opportunity for questions and clarification was provided.       Patient transported with:   O2 @ 3 liters

## 2020-07-22 NOTE — PROGRESS NOTES
Rey Mayer. is a 81 y.o. male who was seen by synchronous (real-time) audio-video technology on 7/22/2020 for No chief complaint on file. I spent at least 15 minutes on this visit with this established patient. 712  Subjective:   He is having a several week pmh of shortness of breath  Now can only take 1/2 breaths, can't move around  Pain in the upper right back  Has pmh of copd and on his Anoro inhaler, not helping  Denies fever    Prior to Admission medications    Medication Sig Start Date End Date Taking? Authorizing Provider   buPROPion SR [facility] SR) 150 mg SR tablet Take 1 Tab by mouth two (2) times a day. 6/23/20   Dianne Mondragon MD   torsemide (DEMADEX) 20 mg tablet Take 0.5 Tabs by mouth as needed (for shortness of breath or edema). FYI dose decrease by JING Moore NP on 9/18/19 6/23/20   Dianne Mondragon MD   ergocalciferol (ERGOCALCIFEROL) 1,250 mcg (50,000 unit) capsule Take 1 Cap by mouth every seven (7) days. Indications: vitamin D deficiency (high dose therapy) 6/11/20   Inderjit Bowling NP   metoprolol succinate (TOPROL-XL) 50 mg XL tablet Take 0.5 Tabs by mouth daily. 6/11/20   Inderjit Bowling NP   sacubitriL-valsartan Buel Kareem) 49-51 mg tab tablet Take 1 Tab by mouth two (2) times a day. 6/11/20   Inderjit Bowling NP   umeclidinium-vilanteroL (Anoro Ellipta) 62.5-25 mcg/actuation inhaler Take 1 Puff by inhalation daily. 3/19/20   Corey Garcia MD   potassium chloride (K-DUR, KLOR-CON) 20 mEq tablet TAKE 1 TABLET BY MOUTH TWICE DAILY 3/10/20   Inderjit Bowling NP   insulin NPH/insulin regular (NOVOLIN 70/30 U-100 INSULIN) 100 unit/mL (70-30) injection INJECT 40 UNITS SUBCUTANEOUSLY IN THE MORNING AND 20 UNITS IN THE EVENING  Patient taking differently: INJECT 10 UNITS SUBCUTANEOUSLY IN THE MORNING AND 8 UNITS IN THE EVENING 2/24/20   Corey Garcia MD   pen needle, diabetic (NOVOFINE PLUS) 32 gauge x 1/6\" ndle 1 Each by Does Not Apply route daily. 11/26/19   Porfirio oWods MD   atorvastatin (LIPITOR) 40 mg tablet TAKE ONE TABLET BY MOUTH ONCE DAILY  Indications: excessive fat in the blood 4/2/19   Killian Douglas MD   aspirin 81 mg chewable tablet Take 81 mg by mouth daily.     Provider, Historical   Insulin Syringes, Disposable, 1 mL syrg Pt to take 10 units w/ breakfast and 8 units w/ dinner 2/32/90   GREGORY Ceballos     Patient Active Problem List    Diagnosis Date Noted    PAD (peripheral artery disease) ([facility] 12/16/2019     Priority: 1 - One    Presence of Watchman left atrial appendage closure device 11/21/2019     Priority: 1 - One    Continuous dependence on cigarette smoking 06/10/2020    CKD (chronic kidney disease) stage 3, GFR 30-59 ml/min ([address] 11/26/2019    Vitamin deficiency 01/29/2019    Chronic systolic congestive heart failure, NYHA class 3 ([address] 01/08/2019    Type 2 diabetes mellitus with diabetic neuropathy ([address] 12/06/2018    Neuropathy 11/19/2018    Claudication ([address] 10/09/2018    Tobacco dependence syndrome 10/09/2018    Noncompliance with medication regimen 09/11/2018    Proteinuria 06/06/2018    Vitamin D deficiency 06/04/2018    Polyp of colon 03/05/2018    Advance care planning 03/05/2018    Type 2 diabetes mellitus with nephropathy ([address] 01/11/2018    Hypokalemia 01/11/2018    ICD (implantable cardioverter-defibrillator) in place 12/04/2017    Peripheral vascular disease ([address] 09/18/2017    Coronary artery disease involving native heart without angina pectoris 08/17/2017    NYHA class 2 and ACC/AHA stage C chronic systolic congestive heart failure ([address] 08/17/2017    Type 2 diabetes mellitus with complication ([address] 75/57/6995    Paroxysmal atrial fibrillation ([address] 03/06/2017    H/O: GI bleed 03/06/2017    Hepatitis C 03/06/2017    Chronic renal insufficiency 03/06/2017    Dyslipidemia 03/06/2017    Ischemic cardiomyopathy 01/20/2017    NAVARRO (dyspnea on exertion) 01/19/2017     Current Outpatient Medications   Medication Sig Dispense Refill    buPROPion SR (WELLBUTRIN SR) 150 mg SR tablet Take 1 Tab by mouth two (2) times a day. 60 Tab 5    torsemide (DEMADEX) 20 mg tablet Take 0.5 Tabs by mouth as needed (for shortness of breath or edema). FYI dose decrease by JING Warren NP on 9/18/19 180 Tab 1    ergocalciferol (ERGOCALCIFEROL) 1,250 mcg (50,000 unit) capsule Take 1 Cap by mouth every seven (7) days. Indications: vitamin D deficiency (high dose therapy) 12 Cap 0    metoprolol succinate (TOPROL-XL) 50 mg XL tablet Take 0.5 Tabs by mouth daily. 90 Tab 3    sacubitriL-valsartan (Entresto) 49-51 mg tab tablet Take 1 Tab by mouth two (2) times a day. 180 Tab 0    umeclidinium-vilanteroL (Anoro Ellipta) 62.5-25 mcg/actuation inhaler Take 1 Puff by inhalation daily. 1 Inhaler 5    potassium chloride (K-DUR, KLOR-CON) 20 mEq tablet TAKE 1 TABLET BY MOUTH TWICE DAILY 60 Tab 3    insulin NPH/insulin regular (NOVOLIN 70/30 U-100 INSULIN) 100 unit/mL (70-30) injection INJECT 40 UNITS SUBCUTANEOUSLY IN THE MORNING AND 20 UNITS IN THE EVENING (Patient taking differently: INJECT 10 UNITS SUBCUTANEOUSLY IN THE MORNING AND 8 UNITS IN THE EVENING) 10 mL 5    pen needle, diabetic (NOVOFINE PLUS) 32 gauge x 1/6\" ndle 1 Each by Does Not Apply route daily. 100 Pen Needle 1    atorvastatin (LIPITOR) 40 mg tablet TAKE ONE TABLET BY MOUTH ONCE DAILY  Indications: excessive fat in the blood 90 Tab 3    aspirin 81 mg chewable tablet Take 81 mg by mouth daily.  Insulin Syringes, Disposable, 1 mL syrg Pt to take 10 units w/ breakfast and 8 units w/ dinner 500 Syringe 1       ROS  A comprehensive review of system was obtained and negative except findings in the HPI      Objective:   No flowsheet data found.    General: alert, cooperative, no distress   Mental  status: normal mood, behavior, speech, dress, motor activity, and thought processes, able to follow commands   HENT: NCAT   Neck: no visualized mass Resp: Positive for respiratory distress   Neuro: no gross deficits   Skin: no discoloration or lesions of concern on visible areas   Psychiatric: normal affect, consistent with stated mood, no evidence of hallucinations     Additional exam findings: short of breath during conversation    Diagnoses and all orders for this visit:    1. Shortness of breath    2. Chronic obstructive pulmonary disease with acute exacerbation [facility])      Referral to ER for eval and CXR  Must r/o pneumonia  Patient agrees and wife will drive him to ER to be seen    We discussed the expected course, resolution and complications of the diagnosis(es) in detail. Medication risks, benefits, costs, interactions, and alternatives were discussed as indicated. I advised him to contact the office if his condition worsens, changes or fails to improve as anticipated. He expressed understanding with the diagnosis(es) and plan. Fernanda Rodriguez, who was evaluated through a patient-initiated, synchronous (real-time) audio-video encounter, and/or his healthcare decision maker, is aware that it is a billable service, with coverage as determined by his insurance carrier. He provided verbal consent to proceed: Yes, and patient identification was verified. It was conducted pursuant to the emergency declaration under the [facility], [address] [place] [address] authority and the [employer] and [employer] Act. A caregiver was present when appropriate. Ability to conduct physical exam was limited. I was in the office. The patient was at home.       Long Wilson NP

## 2020-07-22 NOTE — PROGRESS NOTES
[address] family [facility] to get order for different pain medication as pt is reporting 5/10 despite being given pain medication. Orders being placed. 1900 Bedside shift report given to oncoming RN, Estela Yoon. Report included SBAR, MAR, Procedure, Telemetry.

## 2020-07-22 NOTE — ED PROVIDER NOTES
This is a 73-year-old male who presents by private vehicle to the emergency room with complaints of increased chest pain, shortness of breath and back pain. Patient states his chest pain and shortness of breath started about a month ago and has worsened. Went to his PCP this morning who sent him to the ED for evaluation. Patient is currently complaining of chest pain, shortness of breath at rest stating he cannot walk secondary to increased dyspnea on exertion, back pain. Denies any dizziness, nausea or vomiting, fever or chills. Denies any recent COVID exposure. Has a history of cardiac stents in the past, is not on anticoagulation. Patient also has a history of COPD but remains a current smoker. IV drug abuse in the remote past.  There are no further complaints at this time. José Miguel Ferrell MD  Past Medical History:  No date: Arthritis      Comment:  hands/fingers-OSTEO  No date: CAD (coronary artery disease)      Comment:  involving native coronary artery of native heart without               angina pectoris  01/20/2017: Cardiomyopathy ([facility] [address]      Comment:  JOSSIE  Echo (1/19/17):  EF 5-10% with severe GHK,. Mildly                dil LA. Mild TR. PASP 46./systemic cardiomyopathy  No date: Chronic kidney disease  No date: Chronic obstructive pulmonary disease ([facility] [address] [address]  03/06/2017: Chronic renal insufficiency  4/1/2777: Chronic systolic congestive heart failure, NYHA class 3   ([facility]  10/9/2018: Claudication ([facility]  No date: Congestive heart failure ([facility] [address]  3/6/2017: Diabetes mellitus ([facility] [address]      Comment:  IDDM  3/6/2017: Dyslipidemia      Comment:  JOSSIE FLP (1/19/17):   Tot 120, , HDL 19, LDL 76 (no                Rx).  3/6/2017: H/O: GI bleed  3/6/2017: Hepatitis C  No date: Hypokalemia  No date: ICD (implantable cardioverter-defibrillator) in place  No date: Ill-defined condition      Comment:  flu 1/2018 11/19/2018: Neuropathy  9/11/2018: Noncompliance with medication regimen  3/6/2017: Paroxysmal atrial fibrillation ([facility]  9/18/2017: Peripheral vascular disease ([facility]      Comment:  A.  RICKY (3/20/17): Right 0.58, Left 0.56. No date: Seizures ([facility]      Comment:  WITH HEPARIN  10/9/2018: Tobacco dependence syndrome  6/4/2018: Vitamin D deficiency  Past Surgical History:  No date: CARDIAC SURG PROCEDURE UNLIST      Comment:  CORONARY STENTX 3 2 CORONARY, & 1 FEMORAL  2/17/2017: COLONOSCOPY; N/A      Comment:  COLONOSCOPY performed by Inessa Copeland MD at [facility]                ENDOSCOPY  2/5/2018: COLONOSCOPY; N/A      Comment:  COLONOSCOPY performed by Inessa Copeland MD at [facility]                ENDOSCOPY  10/22/2019: COLONOSCOPY; N/A      Comment:  COLONOSCOPY performed by Denilson Huang MD at [facility]                ENDOSCOPY  No date: HX CORONARY STENT PLACEMENT      Comment:  LM, RCA x 2   10/22/2019: HX ENDOSCOPY      Comment:  [facility]   No date: HX GI      Comment:  colonoscopy x 3 - last 2/2017 - polyp  No date: HX IMPLANTABLE CARDIOVERTER DEFIBRILLATOR  No date: HX OTHER SURGICAL  No date: HX PACEMAKER      Comment:  AICD  2017: VASCULAR SURGERY PROCEDURE UNLIST      Comment:  STENT - RIGHT FEMORAL              Past Medical History:   Diagnosis Date    Arthritis     hands/fingers-OSTEO    CAD (coronary artery disease)     involving native coronary artery of native heart without angina pectoris    Cardiomyopathy ([facility] 01/20/2017    A. Echo (1/19/17):  EF 5-10% with severe GHK,. Mildly dil LA. Mild TR. PASP 46./systemic cardiomyopathy    Chronic kidney disease     Chronic obstructive pulmonary disease (HCC)     Chronic renal insufficiency 03/06/2017    Chronic systolic congestive heart failure, NYHA class 3 ([facility] 1/8/2019    Claudication ([address] 10/9/2018    Congestive heart failure ([facility]     Diabetes mellitus ([facility] 3/6/2017    IDDM    Dyslipidemia 3/6/2017    A. FLP (1/19/17): Tot 120, , HDL 19, LDL 76 (no Rx).     H/O: GI bleed 3/6/2017    Hepatitis C 3/6/2017    Hypokalemia     ICD (implantable cardioverter-defibrillator) in place     Ill-defined condition     flu 2018     Neuropathy 2018    Noncompliance with medication regimen 2018    Paroxysmal atrial fibrillation ([address] 3/6/2017    Peripheral vascular disease ([address] 2017    A. RICKY (3/20/17): Right 0.58, Left 0.56.  Seizures ([facility] [address]     WITH HEPARIN    Tobacco dependence syndrome 10/9/2018    Vitamin D deficiency 2018       Past Surgical History:   Procedure Laterality Date    CARDIAC SURG PROCEDURE UNLIST      CORONARY STENTX 3 2 CORONARY, & 1 FEMORAL    COLONOSCOPY N/A 2017    COLONOSCOPY performed by Minor Trujillo. Naveed Baeza MD at [facility] COLONOSCOPY N/A 2018    COLONOSCOPY performed by Minor Trujillo. Naveed Baeza MD at [facility] ENDOSCOPY    COLONOSCOPY N/A 10/22/2019    COLONOSCOPY performed by Yanick Warren MD at [facility] ENDOSCOPY    HX CORONARY STENT PLACEMENT      LM, RCA x 2     HX ENDOSCOPY  10/22/2019    [facility]     HX GI      colonoscopy x 3 - last 2017 - polyp    HX IMPLANTABLE CARDIOVERTER DEFIBRILLATOR      HX OTHER SURGICAL      HX PACEMAKER      AICD    VASCULAR SURGERY PROCEDURE UNLIST  2017    STENT - RIGHT FEMORAL          Family History:   Problem Relation Age of Onset    Hypertension Mother     Heart Disease Mother         MURMUR    Cancer Father         COLON    Colon Cancer Father     Other Sister         born totally handicapped/epileptic    Kidney Disease Sister          from renal shutdown    Heart Disease Brother     Other Brother         ?pancreatic cancer or ?pancreatitis    Cancer Brother         PANCREATIC?  AND COLON    Cancer Maternal Uncle         toes and spread    Cancer Paternal Uncle         stomach    Heart Attack Maternal Grandfather 47    Cancer Paternal Grandfather         bone    Cancer Maternal Uncle         stomach    Cancer Maternal Uncle         lung    No Known Problems Son     No Known Problems Son     Anesth Problems Neg Hx Social History     Socioeconomic History    Marital status:      Spouse name: Not on file    Number of children: Not on file    Years of education: Not on file    Highest education level: Not on file   Occupational History    Not on file   Social Needs    Financial resource strain: Not on file    Food insecurity     Worry: Not on file     Inability: Not on file    Transportation needs     Medical: Not on file     Non-medical: Not on file   Tobacco Use    Smoking status: Current Every Day Smoker     Packs/day: 1.00     Years: 55.00     Pack years: 55.00    Smokeless tobacco: Never Used    Tobacco comment:     Substance and Sexual Activity    Alcohol use: Yes     Comment: 1 BEER WEEKLY    Drug use: Not Currently    Sexual activity: Yes     Partners: Female   Lifestyle    Physical activity     Days per week: Not on file     Minutes per session: Not on file    Stress: Not on file   Relationships    Social connections     Talks on phone: Not on file     Gets together: Not on file     Attends Christian service: Not on file     Active member of club or organization: Not on file     Attends meetings of clubs or organizations: Not on file     Relationship status: Not on file    Intimate partner violence     Fear of current or ex partner: Not on file     Emotionally abused: Not on file     Physically abused: Not on file     Forced sexual activity: Not on file   Other Topics Concern    Not on file   Social History Narrative    Not on file         ALLERGIES: Heparin (porcine)    Review of Systems   Constitutional: Positive for activity change (can not ambulate due to \"can't breathe \"). Negative for appetite change, chills, fatigue and fever. HENT: Negative for congestion, ear discharge, ear pain, sinus pressure, sinus pain, sore throat and trouble swallowing. Eyes: Negative for photophobia, pain, redness, itching and visual disturbance. Respiratory: Positive for shortness of breath. Negative for chest tightness. Cardiovascular: Positive for chest pain. Negative for palpitations. Gastrointestinal: Negative for abdominal distention, abdominal pain, nausea and vomiting. Endocrine: Negative. Genitourinary: Negative for difficulty urinating, frequency and urgency. Musculoskeletal: Positive for back pain. Negative for neck pain and neck stiffness. Skin: Negative for color change, pallor, rash and wound. Allergic/Immunologic: Negative. Neurological: Negative for dizziness, syncope, weakness and headaches. Hematological: Does not bruise/bleed easily. Psychiatric/Behavioral: Negative for behavioral problems. The patient is not nervous/anxious. Vitals:    07/22/20 0933   BP: (!) 150/93   Pulse: (!) 109   Resp: 20   Temp: 98.7 °F (37.1 °C)   SpO2: 98%   Weight: 81.2 kg (179 lb)   Height: 5' 10\" (1.778 m)            Physical Exam  Vitals signs and nursing note reviewed. Constitutional:       General: He is not in acute distress. Appearance: Normal appearance. He is well-developed. HENT:      Head: Normocephalic and atraumatic. Right Ear: External ear normal.      Left Ear: External ear normal.      Nose: Nose normal.      Mouth/Throat:      Mouth: Mucous membranes are moist.   Eyes:      General:         Right eye: No discharge. Left eye: No discharge. Conjunctiva/sclera: Conjunctivae normal.      Pupils: Pupils are equal, round, and reactive to light. Neck:      Musculoskeletal: Normal range of motion and neck supple. Vascular: No JVD. Trachea: No tracheal deviation. Cardiovascular:      Rate and Rhythm: Regular rhythm. Tachycardia present. Pulses: Normal pulses. Heart sounds: Normal heart sounds. No murmur. No gallop. Pulmonary:      Effort: No respiratory distress. Breath sounds: Wheezing present. No rales. Comments: SOB at rest, tachypnea  Chest:      Chest wall: No tenderness.    Abdominal:      General: Bowel sounds are normal. There is no distension. Palpations: Abdomen is soft. Tenderness: There is no abdominal tenderness. There is no guarding or rebound. Genitourinary:     Comments: Negative    Musculoskeletal: Normal range of motion. General: No tenderness. Skin:     General: Skin is warm and dry. Capillary Refill: Capillary refill takes less than 2 seconds. Coloration: Skin is not pale. Findings: No erythema or rash. Neurological:      General: No focal deficit present. Mental Status: He is alert and oriented to person, place, and time. Motor: No weakness. Coordination: Coordination normal.   Psychiatric:         Mood and Affect: Mood normal.         Behavior: Behavior normal.         Thought Content: Thought content normal.         Judgment: Judgment normal.          MDM  Number of Diagnoses or Management Options  Elevated brain natriuretic peptide (BNP) level: new and requires workup  Hyponatremia: new and requires workup  Neoplasm of lung: new and requires workup  Pleural effusion: new and requires workup  SOB (shortness of breath): new and requires workup  Diagnosis management comments: Differential diagnosis includes dissection, MI, PE, COPD exacerbation and others. Admit to the hospital service for further evaluation and treatment. Patient in agreement with plan of care. Discussed with Dr. Isabella Woodall who is in agreement with plan of care. Amount and/or Complexity of Data Reviewed  Clinical lab tests: ordered and reviewed  Tests in the radiology section of CPT®: ordered and reviewed  Discuss the patient with other providers: yes (Dr. Isabella Woodall  )         [facility] Serve for Admission  11:06 AM    ED Room Number: ER08/08  Patient Name and age:  Elicia Miranda. 81 y.o.  male  Working Diagnosis:   1. Pleural effusion    2. Hyponatremia    3. SOB (shortness of breath)    4.  Neoplasm of lung        COVID-19 Suspicion:  no    Code Status:  Full Code  Readmission: no  Isolation Requirements:  no  Recommended Level of Care:  telemetry  Department:[facility] ED - [phone]  Other:  Oncology consulted, NPO for possible biopsy today       Procedures

## 2020-07-22 NOTE — CONSULTS
Sasha Candelario MD. [facility] [place]              Patient: Farideh Ross. : 1950      Today's Date: 2020            HISTORY OF PRESENT ILLNESS:     History of Present Illness:  Reason for consult:  CHF exacerbation, LVEF 20-25%       Fernanda Sequeira is a 81 y.o. male with PMHx of CAD, COPD, CHF (EF-20%), T2DM, CKD3, and hx of GIB who presents to the ED complaining of increasing SOB and shoulder / chest pain. The pt states that about a month ago he began to have back and R shoulder pain. He also has had worsening dyspnea on exertion with wheezing, night sweats, cough with gray sputum, diarrhea, decreased PO intake and abdominal 'soreness'. Symptoms progressed and he felt poorly and stopped all his medications 2 weeks ago. He currently denies any fevers, chills, lower extremity edema     Workup today shows a new large lung mass with metastatic disease (see below). CXR 20 - New asymmetric right-sided airspace disease with right pleural effusion    Chest CTA 20 - There is a large right upper lobe mass lesion. Extensive mediastinal lymphadenopathy. Numerous hepatic hypodensities consistent with metastatic disease. Mesenteric and retroperitoneal lymphadenopathy as well. There is a moderate to large right-sided pleural effusion. PAST MEDICAL HISTORY:     Past Medical History:   Diagnosis Date    Arthritis     hands/fingers-OSTEO    CAD (coronary artery disease)     involving native coronary artery of native heart without angina pectoris    Cardiomyopathy ([address] 2017    A. Echo (17):  EF 5-10% with severe GHK,. Mildly dil LA. Mild TR.   PASP 46./systemic cardiomyopathy    Chronic kidney disease     Chronic obstructive pulmonary disease (HCC)     Chronic renal insufficiency 2017    Chronic systolic congestive heart failure, NYHA class 3 ([address] 2019    Claudication ([address] 10/9/2018    Congestive heart failure ([address]     Diabetes mellitus ([facility] 3/6/2017    IDDM    Dyslipidemia 3/6/2017    A. FLP (1/19/17): Tot 120, , HDL 19, LDL 76 (no Rx).  H/O: GI bleed 3/6/2017    Hepatitis C 3/6/2017    Hypokalemia     ICD (implantable cardioverter-defibrillator) in place     Ill-defined condition     flu 1/2018     Neuropathy 11/19/2018    Noncompliance with medication regimen 9/11/2018    Paroxysmal atrial fibrillation ([facility] 3/6/2017    Peripheral vascular disease ([facility] 9/18/2017    A. RICKY (3/20/17): Right 0.58, Left 0.56.  Seizures ([facility]     WITH HEPARIN    Tobacco dependence syndrome 10/9/2018    Vitamin D deficiency 6/4/2018    Vitamin deficiency 1/29/2019       Past Surgical History:   Procedure Laterality Date    CARDIAC SURG PROCEDURE UNLIST      CORONARY STENTX 3 2 CORONARY, & 1 FEMORAL    COLONOSCOPY N/A 2/17/2017    COLONOSCOPY performed by Claudia Morfin. Sofya Ramirez MD at [address] COLONOSCOPY N/A 2/5/2018    COLONOSCOPY performed by Claudia Morfin. Sofya Ramirez MD at [address] COLONOSCOPY N/A 10/22/2019    COLONOSCOPY performed by Renetta To MD at [address] [place] [address], RCA x 2     HX ENDOSCOPY  10/22/2019    [facility]     HX GI      colonoscopy x 3 - last 2/2017 - polyp    HX IMPLANTABLE CARDIOVERTER DEFIBRILLATOR      HX OTHER SURGICAL      HX PACEMAKER      AICD    VASCULAR SURGERY PROCEDURE UNLIST  2017    STENT - RIGHT FEMORAL        MEDICATIONS:       No current facility-administered medications on file prior to encounter. Current Outpatient Medications on File Prior to Encounter   Medication Sig Dispense Refill    insulin NPH/insulin regular (NovoLIN 70/30 U-100 Insulin) 100 unit/mL (70-30) injection 20 Units by SubCUTAneous route Daily (before breakfast).  insulin NPH/insulin regular (NovoLIN 70/30 U-100 Insulin) 100 unit/mL (70-30) injection 10 Units by SubCUTAneous route Daily (before dinner).       buPROPion SR (WELLBUTRIN SR) 150 mg SR tablet Take 1 Tab by mouth two (2) times a day. 60 Tab 5    torsemide (DEMADEX) 20 mg tablet Take 0.5 Tabs by mouth as needed (for shortness of breath or edema). FYI dose decrease by JING Gudino NP on 19 180 Tab 1    ergocalciferol (ERGOCALCIFEROL) 1,250 mcg (50,000 unit) capsule Take 1 Cap by mouth every seven (7) days. Indications: vitamin D deficiency (high dose therapy) 12 Cap 0    metoprolol succinate (TOPROL-XL) 50 mg XL tablet Take 0.5 Tabs by mouth daily. 90 Tab 3    sacubitriL-valsartan (Entresto) 49-51 mg tab tablet Take 1 Tab by mouth two (2) times a day. 180 Tab 0    umeclidinium-vilanteroL (Anoro Ellipta) 62.5-25 mcg/actuation inhaler Take 1 Puff by inhalation daily. 1 Inhaler 5    potassium chloride (K-DUR, KLOR-CON) 20 mEq tablet TAKE 1 TABLET BY MOUTH TWICE DAILY 60 Tab 3    aspirin 81 mg chewable tablet Take 81 mg by mouth daily.  atorvastatin (LIPITOR) 40 mg tablet TAKE ONE TABLET BY MOUTH ONCE DAILY  Indications: excessive fat in the blood 90 Tab 3       Allergies   Allergen Reactions    Heparin (Porcine) Other (comments)     Was told when in the hospital that if he received heparin again that it would be deadly. SOCIAL HISTORY:     Social History     Tobacco Use    Smoking status: Current Every Day Smoker     Packs/day: 1.00     Years: 55.00     Pack years: 55.00    Smokeless tobacco: Never Used    Tobacco comment:     Substance Use Topics    Alcohol use: Yes     Comment: 1 BEER WEEKLY    Drug use: Not Currently           FAMILY HISTORY:     Family History   Problem Relation Age of Onset    Hypertension Mother     Heart Disease Mother         MURMUR    Cancer Father         COLON    Colon Cancer Father     Other Sister         born totally handicapped/epileptic    Kidney Disease Sister          from renal shutdown    Heart Disease Brother     Other Brother         ?pancreatic cancer or ?pancreatitis    Cancer Brother         PANCREATIC?  AND COLON    Cancer Maternal Uncle         toes and spread    Cancer Paternal Uncle         stomach    Heart Attack Maternal Grandfather 47    Cancer Paternal Grandfather         bone    Cancer Maternal Uncle         stomach    Cancer Maternal Uncle         lung    No Known Problems Son     No Known Problems Son     Anesth Problems Neg Hx            REVIEW OF SYMPTOMS:     Review of Symptoms:  Constitutional: Negative for fever, chills  HEENT: Negative for nosebleeds   Respiratory: + cough, wheezing   Cardiovascular: Negative for  Syncope   Gastrointestinal: + abdominal pain, diarrhea    Genitourinary: Negative for dysuria  Musculoskeletal: + shoulder pain   Skin: Negative for rash  Heme: No problems bleeding. Neurological: Negative for speech change and focal weakness. PHYSICAL EXAM:     Physical Exam:  Visit Vitals  BP (!) 148/92   Pulse (!) 106   Temp 98.7 °F (37.1 °C)   Resp 16   Ht 5' 10\" (1.778 m)   Wt 179 lb (81.2 kg)   SpO2 99%   BMI 25.68 kg/m²     Patient appears in NAD  HEENT:  Hearing intact, non-icteric, normocephalic, atraumatic. Neck Exam: Supple, Hard to assess JVD (large beard)   Lung Exam: diminished at right base   Cardiac Exam: Regular rate and rhythm with no murmur    Abdomen: Soft, non-tender, normal bowel sounds. No bruits or masses. Extremities: Moves all ext well. No lower extremity edema. Vascular: weak dorsalis pedis pulses bilaterally.   Psych: Appropriate affect  Neuro - Grossly intact        LABS / OTHER STUDIES:     Recent Results (from the past 24 hour(s))   EKG, 12 LEAD, INITIAL    Collection Time: 07/22/20  9:39 AM   Result Value Ref Range    Ventricular Rate 111 BPM    Atrial Rate 111 BPM    P-R Interval 168 ms    QRS Duration 114 ms    Q-T Interval 368 ms    QTC Calculation (Bezet) 500 ms    Calculated P Axis 60 degrees    Calculated R Axis 40 degrees    Calculated T Axis 49 degrees    Diagnosis       Sinus tachycardia  Possible Left atrial enlargement  Incomplete left bundle branch block  Abnormal ECG  When compared with ECG of 09-FEB-2017 20:25,  fusion complexes are no longer present  Nonspecific T wave abnormality no longer evident in Inferior leads  Confirmed by Karma Queen MD., Chago Miranda ([id number]) on 7/22/2020 12:06:16 PM     SAMPLES BEING HELD    Collection Time: 07/22/20  9:57 AM   Result Value Ref Range    SAMPLES BEING HELD 1bl,1rd,1sst     COMMENT        Add-on orders for these samples will be processed based on acceptable specimen integrity and analyte stability, which may vary by analyte. CBC WITH AUTOMATED DIFF    Collection Time: 07/22/20  9:57 AM   Result Value Ref Range    WBC 7.6 4.1 - 11.1 K/uL    RBC 5.86 (H) 4.10 - 5.70 M/uL    HGB 16.0 12.1 - 17.0 g/dL    HCT 46.6 36.6 - 50.3 %    MCV 79.5 (L) 80.0 - 99.0 FL    MCH 27.3 26.0 - 34.0 PG    MCHC 34.3 30.0 - 36.5 g/dL    RDW 14.6 (H) 11.5 - 14.5 %    PLATELET 564 605 - 208 K/uL    MPV 9.4 8.9 - 12.9 FL    NRBC 0.0 0  WBC    ABSOLUTE NRBC 0.00 0.00 - 0.01 K/uL    NEUTROPHILS 82 (H) 32 - 75 %    LYMPHOCYTES 7 (L) 12 - 49 %    MONOCYTES 8 5 - 13 %    EOSINOPHILS 1 0 - 7 %    BASOPHILS 1 0 - 1 %    IMMATURE GRANULOCYTES 1 (H) 0.0 - 0.5 %    ABS. NEUTROPHILS 6.2 1.8 - 8.0 K/UL    ABS. LYMPHOCYTES 0.5 (L) 0.8 - 3.5 K/UL    ABS. MONOCYTES 0.6 0.0 - 1.0 K/UL    ABS. EOSINOPHILS 0.1 0.0 - 0.4 K/UL    ABS. BASOPHILS 0.1 0.0 - 0.1 K/UL    ABS. IMM.  GRANS. 0.1 (H) 0.00 - 0.04 K/UL    DF SMEAR SCANNED      RBC COMMENTS MICROCYTOSIS  1+       METABOLIC PANEL, COMPREHENSIVE    Collection Time: 07/22/20  9:57 AM   Result Value Ref Range    Sodium 124 (L) 136 - 145 mmol/L    Potassium 3.9 3.5 - 5.1 mmol/L    Chloride 90 (L) 97 - 108 mmol/L    CO2 23 21 - 32 mmol/L    Anion gap 11 5 - 15 mmol/L    Glucose 158 (H) 65 - 100 mg/dL    BUN 20 6 - 20 MG/DL    Creatinine 1.35 (H) 0.70 - 1.30 MG/DL    BUN/Creatinine ratio 15 12 - 20      GFR est AA >60 >60 ml/min/1.73m2    GFR est non-AA 53 (L) >60 ml/min/1.73m2    Calcium 9.0 8.5 - 10.1 MG/DL Bilirubin, total 0.5 0.2 - 1.0 MG/DL    ALT (SGPT) 31 12 - 78 U/L    AST (SGOT) 52 (H) 15 - 37 U/L    Alk. phosphatase 141 (H) 45 - 117 U/L    Protein, total 7.8 6.4 - 8.2 g/dL    Albumin 3.2 (L) 3.5 - 5.0 g/dL    Globulin 4.6 (H) 2.0 - 4.0 g/dL    A-G Ratio 0.7 (L) 1.1 - 2.2     LIPASE    Collection Time: 07/22/20  9:57 AM   Result Value Ref Range    Lipase 201 73 - 393 U/L   LACTIC ACID    Collection Time: 07/22/20 10:59 AM   Result Value Ref Range    Lactic acid 1.8 0.4 - 2.0 MMOL/L   TROPONIN I    Collection Time: 07/22/20 10:59 AM   Result Value Ref Range    Troponin-I, Qt. <0.05 <0.05 ng/mL   NT-PRO BNP    Collection Time: 07/22/20 10:59 AM   Result Value Ref Range    NT pro-BNP 10,129 (H) <125 PG/ML           CARDIAC DIAGNOSTICS:     Cardiac Evaluation Includes:    Cath (1/20/17):  LM ost70.  LAD m50.  D1 80.  LCx d70; OM1 99, OM2 90.  RCA p100.  No AVG.  PAP  53/27/36. --->   PCI (2/9/17): pRCA 2.5x38 Xience + 2.75x12 Xience.  ostLM 4.0x12 Xience post-dil with 4.5x12 NC. ICD (6/12/17, Anup): [employer]. ECHO (6/15/2020)  LVEF 20-25%, trace MR    CXR 7/22/20 - New asymmetric right-sided airspace disease with right pleural effusion    Chest CTA 7/22/20 - There is a large right upper lobe mass lesion. Extensive mediastinal lymphadenopathy. Numerous hepatic hypodensities consistent with metastatic disease. Mesenteric and retroperitoneal lymphadenopathy as well. There is a moderate to large right-sided pleural effusion.          ASSESSMENT AND PLAN:     Assessment and Plan:    1) SOB with New right sided lung mass and associated right pleural effusion and Hyponatremia    - Mass is suspicious for cancer ---- workup per medicine team   - Has a right sided pleural effusion - consider pulmonary evaluation for thoracentesis for comfort   - Low Na+ likely from SIADH -- His Na+ is low off of all diuretics past 2 weeks     2) Acute on chronic systolic heart failure d/t ICM, Stage C, NYHA Class II, LVEF 20-25%   - proBNP now 10k on admission  - Is higher than before   - Diuretics held on admission due to hyponatremia -- however he has been off of his diuretics for 2 weeks.   ---->  would like to continue his diuretics if possible ---> Would ask Nephrology to assist and help manage volume and Na+  -  He stopped Toprol XL and Entresto 2 weeks ago -- would start these meds gradually (he already received [employer]). 3) CAD s/p Impella assisted hrPCI - MIKE to 100%pRCA and 70% LM - 2/19/17   - Continue ASA, statin    4) HTN  - adjust meds as needed     5) PAD not amenable to PTCA with intermittent claudication    6) CKD3    7) S/p GI bleed and colorectal surgery    8) PAF        Watchman aborted due to thrombus along coumadin ridge, not taking eliquis 5mg d/t to cost, only on ASA 81mg- f/u with Dr. Bob Peralta amiodarone due to Hyperthyroidism, check thyroid profile every 3 months and PFT/DLCO every year         Not on anticoagulation d/t cost    9) High Risk of SCD - s/p AICD  - will need to check if device MRI compatible since plans for brain MRI           Hasmukh Mack MD, [place] [id number]  [address] [facility], [address]  [address]  [address] [place] [address], [facility] [address]  Ph: [phone]   Ph [phone]

## 2020-07-22 NOTE — H&P
[address] [facility] [address] [place] [address]   Office [phone]  Fax [phone]       Admission H&P     Name: Matt Iyer. MRN: [id number]  Sex: Male   YOB: 1950  Age: 81 y.o. PCP: Fabián Castillo MD     Source of Information: patient, medical records    Chief complaint: chest pain, SOB    History of Present Illness  Anila Acosta Apryl. is a 81 y.o. male with PMHx of CHF, CAD, paroxismal a-fib COPD, T2DM, CKD3, and hx of GIB who presents to the ED complaining of increasing SOB and chest pain. The pt states that about a month ago he began to have back and R shoulder pain treated with Tylenol. The last 2 wks he has taken 1g Tylenol Q4H. Over the last month he also complains of worsening dyspnea on exertion with wheezing, night sweats, cough with gray sputum, diarrhea, decreased PO intake and abdominal 'soreness'. Pt also states he has had some weight loss this year but it was intentional, about 19lbs over about 2 months. His shoulder pain and other symptoms were initially controlled with tylenol however they progressed to the point where he made a virtual appointment with his PCP today and was sent to the ED due to concern for pneumonia. He currently denies any fevers, chills, lower extremity edema. The follows with [facility] heart failure clinic- Dr. Neville Urbano.       COVID Questions:   Experiencing any of the following symptoms: fever, chills, cough, SOB, diarrhea, URI symptoms. Any Sick contacts with fever, cough, diarrhea, SOB, URI symptoms. No  Traveled out of state or out of country. No  Lives with Wife. Has been staying at home. Yes    In the ED:  Vitals: Temp 98.7   /93      RR 20   SatO2  98% on RA  Labs: CBC 7.6/16/210. Na 124, Cl 90, Cr 1.35 (Bl 2), BNP 10,129  Imaging: CTA chest- No PE. Lung neoplasm with widespread metastatic disease including hepatic mets and malignant R pleural effusion.   Treatment: Morphine 4mg IV    EKG: Sinus tach, QtC 500, incomplete LBBB      Review of Systems  Review of Systems   Constitutional: Positive for activity change, appetite change, chills and fatigue. Negative for diaphoresis and fever. HENT: Negative for congestion. Respiratory: Positive for cough, shortness of breath and wheezing. Cardiovascular: Negative for chest pain and leg swelling. Gastrointestinal: Positive for abdominal pain, diarrhea and nausea. Negative for vomiting. Genitourinary: Negative for dysuria and hematuria. Musculoskeletal: Negative for arthralgias and joint swelling. Skin: Negative for rash and wound. Neurological: Negative for dizziness, weakness and headaches. Home Medications   Prior to Admission medications    Medication Sig Start Date End Date Taking? Authorizing Provider   buPROPion SR [facility] SR) 150 mg SR tablet Take 1 Tab by mouth two (2) times a day. 6/23/20   Juan Oro MD   torsemide (DEMADEX) 20 mg tablet Take 0.5 Tabs by mouth as needed (for shortness of breath or edema). FYI dose decrease by JING Sheets NP on 9/18/19 6/23/20   Juan Oro MD   ergocalciferol (ERGOCALCIFEROL) 1,250 mcg (50,000 unit) capsule Take 1 Cap by mouth every seven (7) days. Indications: vitamin D deficiency (high dose therapy) 6/11/20   Davis Irene NP   metoprolol succinate (TOPROL-XL) 50 mg XL tablet Take 0.5 Tabs by mouth daily. 6/11/20   Davis Irene NP   sacubitriL-valsartan Elmira Kand) 49-51 mg tab tablet Take 1 Tab by mouth two (2) times a day. 6/11/20   Davis Irene NP   umeclidinium-vilanteroL (Anoro Ellipta) 62.5-25 mcg/actuation inhaler Take 1 Puff by inhalation daily.  3/19/20   Elle Gonzalez MD   potassium chloride (K-DUR, KLOR-CON) 20 mEq tablet TAKE 1 TABLET BY MOUTH TWICE DAILY 3/10/20   Davis Irene NP   insulin NPH/insulin regular (NOVOLIN 70/30 U-100 INSULIN) 100 unit/mL (70-30) injection INJECT 40 UNITS SUBCUTANEOUSLY IN THE MORNING AND 20 UNITS IN THE EVENING  Patient taking differently: INJECT 10 UNITS SUBCUTANEOUSLY IN THE MORNING AND 8 UNITS IN THE EVENING 2/24/20   Lowell Lizama MD   pen needle, diabetic (NOVOFINE PLUS) 32 gauge x 1/6\" ndle 1 Each by Does Not Apply route daily. 11/26/19   Lowell Lizama MD   atorvastatin (LIPITOR) 40 mg tablet TAKE ONE TABLET BY MOUTH ONCE DAILY  Indications: excessive fat in the blood 4/2/19   Guadalupe Nayak MD   aspirin 81 mg chewable tablet Take 81 mg by mouth daily. Provider, Historical   Insulin Syringes, Disposable, 1 mL syrg Pt to take 10 units w/ breakfast and 8 units w/ dinner 4/22/67   GREGORY Mkceon       Allergies  Allergies   Allergen Reactions    Heparin (Porcine) Other (comments)     Was told when in the hospital that if he received heparin again that it would be deadly. Past Medical History  Past Medical History:   Diagnosis Date    Arthritis     hands/fingers-OSTEO    CAD (coronary artery disease)     involving native coronary artery of native heart without angina pectoris    Cardiomyopathy ([facility] 01/20/2017    A. Echo (1/19/17):  EF 5-10% with severe GHK,. Mildly dil LA. Mild TR. PASP 46./systemic cardiomyopathy    Chronic kidney disease     Chronic obstructive pulmonary disease (HCC)     Chronic renal insufficiency 03/06/2017    Chronic systolic congestive heart failure, NYHA class 3 ([address] 1/8/2019    Claudication ([address] 10/9/2018    Congestive heart failure ([address]     Diabetes mellitus ([address] 3/6/2017    IDDM    Dyslipidemia 3/6/2017    A. FLP (1/19/17): Tot 120, , HDL 19, LDL 76 (no Rx).  H/O: GI bleed 3/6/2017    Hepatitis C 3/6/2017    Hypokalemia     ICD (implantable cardioverter-defibrillator) in place     Ill-defined condition     flu 1/2018     Neuropathy 11/19/2018    Noncompliance with medication regimen 9/11/2018    Paroxysmal atrial fibrillation ([address] 3/6/2017    Peripheral vascular disease ([address] 9/18/2017    A. RICKY (3/20/17):   Right 0.58, Left 0.56.  Seizures ([address]     WITH HEPARIN    Tobacco dependence syndrome 10/9/2018    Vitamin D deficiency 2018       Previous Hospitalization(s)  Past Surgical History:   Procedure Laterality Date    CARDIAC SURG PROCEDURE UNLIST      CORONARY STENTX 3 2 CORONARY, & 1 FEMORAL    COLONOSCOPY N/A 2017    COLONOSCOPY performed by Adia Byrne. Dianne Beck MD at [address] COLONOSCOPY N/A 2018    COLONOSCOPY performed by Adia Byrne. Dianne Beck MD at [facility] ENDOSCOPY    COLONOSCOPY N/A 10/22/2019    COLONOSCOPY performed by Leslie De León MD at [facility] ENDOSCOPY    HX CORONARY STENT PLACEMENT      LM, RCA x 2     HX ENDOSCOPY  10/22/2019    [facility]     HX GI      colonoscopy x 3 - last 2017 - polyp    HX IMPLANTABLE CARDIOVERTER DEFIBRILLATOR      HX OTHER SURGICAL      HX PACEMAKER      AICD    VASCULAR SURGERY PROCEDURE UNLIST  2017    STENT - RIGHT FEMORAL        Family History  Family History   Problem Relation Age of Onset    Hypertension Mother     Heart Disease Mother         MURMUR    Cancer Father         COLON    Colon Cancer Father     Other Sister         born totally handicapped/epileptic    Kidney Disease Sister          from renal shutdown    Heart Disease Brother     Other Brother         ?pancreatic cancer or ?pancreatitis    Cancer Brother         PANCREATIC? AND COLON    Cancer Maternal Uncle         toes and spread    Cancer Paternal Uncle         stomach    Heart Attack Maternal Grandfather 47    Cancer Paternal Grandfather         bone    Cancer Maternal Uncle         stomach    Cancer Maternal Uncle         lung    No Known Problems Son     No Known Problems Son     Anesth Problems Neg Hx        Social History  Alcohol history: No recent use  Smoking history: Smokes 2 ppd x 50 years  Illicit drug history: Extensive hx of IV cocaine, meth, and opioids. Denies any in the last 20yrs  Living arrangement: patient with his wife in a house.   23 yrs.  Ambulates: Independently      Vital Signs  Visit Vitals  BP (!) 140/91   Pulse (!) 107   Temp 98.7 °F (37.1 °C)   Resp 21   Ht 5' 10\" (1.778 m)   Wt 179 lb (81.2 kg)   SpO2 96%   BMI 25.68 kg/m²       Physical Exam  General: No acute distress. Alert. Cooperative. Head: Normocephalic. Atraumatic. Ears:              Hearing grossly intact. Respiratory: Bilateral upper lobe expiratory wheezing. No r/r/c.   Cardiovascular: RRR. Normal S1,S2. No m/r/g. Pulses 2+ throughout. GI: + bowel sounds. Sub-xiphoid and epigastric region tender to palpation. No rebound tenderness or guarding. Nondistended. Extremities: Absent LE edema. Distal pulses intact. Pain to R scapula on palpation   Musculoskeletal: Full ROM in all extremities. Skin: Warm, dry. No rashes. Neuro: CN II-XII grossly intact. Strength 5/5 in all extremities. Laboratory Data  Recent Results (from the past 8 hour(s))   SAMPLES BEING HELD    Collection Time: 07/22/20  9:57 AM   Result Value Ref Range    SAMPLES BEING HELD 1bl,1rd,1sst     COMMENT        Add-on orders for these samples will be processed based on acceptable specimen integrity and analyte stability, which may vary by analyte. CBC WITH AUTOMATED DIFF    Collection Time: 07/22/20  9:57 AM   Result Value Ref Range    WBC 7.6 4.1 - 11.1 K/uL    RBC 5.86 (H) 4.10 - 5.70 M/uL    HGB 16.0 12.1 - 17.0 g/dL    HCT 46.6 36.6 - 50.3 %    MCV 79.5 (L) 80.0 - 99.0 FL    MCH 27.3 26.0 - 34.0 PG    MCHC 34.3 30.0 - 36.5 g/dL    RDW 14.6 (H) 11.5 - 14.5 %    PLATELET 275 329 - 633 K/uL    MPV 9.4 8.9 - 12.9 FL    NRBC 0.0 0  WBC    ABSOLUTE NRBC 0.00 0.00 - 0.01 K/uL    NEUTROPHILS 82 (H) 32 - 75 %    LYMPHOCYTES 7 (L) 12 - 49 %    MONOCYTES 8 5 - 13 %    EOSINOPHILS 1 0 - 7 %    BASOPHILS 1 0 - 1 %    IMMATURE GRANULOCYTES 1 (H) 0.0 - 0.5 %    ABS. NEUTROPHILS 6.2 1.8 - 8.0 K/UL    ABS. LYMPHOCYTES 0.5 (L) 0.8 - 3.5 K/UL    ABS. MONOCYTES 0.6 0.0 - 1.0 K/UL    ABS.  EOSINOPHILS 0.1 0.0 - 0.4 K/UL    ABS. BASOPHILS 0.1 0.0 - 0.1 K/UL    ABS. IMM. GRANS. 0.1 (H) 0.00 - 0.04 K/UL    DF SMEAR SCANNED      RBC COMMENTS MICROCYTOSIS  1+       METABOLIC PANEL, COMPREHENSIVE    Collection Time: 07/22/20  9:57 AM   Result Value Ref Range    Sodium 124 (L) 136 - 145 mmol/L    Potassium 3.9 3.5 - 5.1 mmol/L    Chloride 90 (L) 97 - 108 mmol/L    CO2 23 21 - 32 mmol/L    Anion gap 11 5 - 15 mmol/L    Glucose 158 (H) 65 - 100 mg/dL    BUN 20 6 - 20 MG/DL    Creatinine 1.35 (H) 0.70 - 1.30 MG/DL    BUN/Creatinine ratio 15 12 - 20      GFR est AA >60 >60 ml/min/1.73m2    GFR est non-AA 53 (L) >60 ml/min/1.73m2    Calcium 9.0 8.5 - 10.1 MG/DL    Bilirubin, total 0.5 0.2 - 1.0 MG/DL    ALT (SGPT) 31 12 - 78 U/L    AST (SGOT) 52 (H) 15 - 37 U/L    Alk. phosphatase 141 (H) 45 - 117 U/L    Protein, total 7.8 6.4 - 8.2 g/dL    Albumin 3.2 (L) 3.5 - 5.0 g/dL    Globulin 4.6 (H) 2.0 - 4.0 g/dL    A-G Ratio 0.7 (L) 1.1 - 2.2     LIPASE    Collection Time: 07/22/20  9:57 AM   Result Value Ref Range    Lipase 201 73 - 393 U/L   LACTIC ACID    Collection Time: 07/22/20 10:59 AM   Result Value Ref Range    Lactic acid 1.8 0.4 - 2.0 MMOL/L   TROPONIN I    Collection Time: 07/22/20 10:59 AM   Result Value Ref Range    Troponin-I, Qt. <0.05 <0.05 ng/mL   NT-PRO BNP    Collection Time: 07/22/20 10:59 AM   Result Value Ref Range    NT pro-BNP 10,129 (H) <125 PG/ML       Imaging  CXR Results  (Last 48 hours)               07/22/20 1012  XR CHEST PORT Final result    Impression:  IMPRESSION: New asymmetric right-sided airspace disease with right pleural   effusion       Narrative:  EXAM: XR CHEST PORT       INDICATION: Shortness of breath       COMPARISON: 11/14/2019       FINDINGS: A portable AP radiograph of the chest was obtained at 1009 hours. The   patient is on a cardiac monitor. There is a pacemaker in place without lead   fracture. Asymmetric right-sided airspace disease is noted with a small right   pleural effusion. The lungs are hyperinflated. CT Results  (Last 48 hours)               07/22/20 1027  CTA CHEST W OR W WO CONT Final result    Impression:  IMPRESSION:    Imaging findings consistent with lung neoplasm with widespread metastatic   disease. Hilar and mediastinal lymphadenopathy. Hepatic metastatic metastases. Retroperitoneal and mesenteric lymphadenopathy as well. There is significant attenuation of pulmonary arterial vessels extending to the   right upper lobe without pulmonary embolism identified. Small possibly malignant   right-sided pleural effusion. Hepatic mass lesions are amenable to percutaneous sampling. The findings were called to Norm Zelaya on 7/22/2020 at 10:50 AM by Dr. Yani Dunbar. [phone]           Narrative:  Clinical history: r/o pe   INDICATION:   r/o pe   COMPARISON: 1/19/2017       TECHNIQUE: CT of the chest with  IV contrast , Isovue-370 is performed. Axial   images from the thoracic inlet to the level of the upper abdomen are obtained. Manual post-processing of the images and coronal reformatting is also performed. CT dose reduction was achieved through use of a standardized protocol tailored   for this examination and automatic exposure control for dose modulation. Multiplanar reformatted imaging was performed. Sagittal and coronal reformatting. 3-D Postprocessing of imaging was performed. 3-D MIP reconstructed images were obtained in the coronal plane. FINDINGS:    There is a large right upper lobe mass lesion. Extensive mediastinal   lymphadenopathy. Numerous hepatic hypodensities consistent with metastatic   disease. Mesenteric and retroperitoneal lymphadenopathy as well. There is a   moderate to large right-sided pleural effusion. There is significant attenuation   of pulmonary arterial vessels extending to the right upper lobe. Cardiac pacer   in place. Left basilar atelectasis. Cardiomegaly is mild. . The aorta is normal   in course and caliber. Hydropic gallbladder. Assessment and Plan     Fernanda Thompson. is a 81 y.o. male with PMHx of CHF, CAD, paroxismal a-fib COPD, T2DM, CKD3, and hx of GIB who is admitted for dyspnea and CHF exacerbation. Dyspnea 2/2 likely malignant pleural effusion in the setting of COPD: CT chest w/ findings consistent w/ lung neoplasm w/ widespread metastatic disease, small R-sided pleural effusion. Low suspicion for infectious process.  -admit to telemetry  -consult to interventional radiology w/ plan for biopsy of mass       -NPO for procedure, can progress to cardiac diet once completed  -consult to oncology  -consult to palliative care  -atrovent Q4H  -brovana & pulmicort BID  -O2 via nasal canula PRN to maintain O2 sats >88%  -BCx, procal pending    Systolic heart failure w/ reduced ejection fraction (20-25%), NYHA Class III: Pt has ICD. POA BNP 10,129. Echo (6/15/20) w/ LVEF 20-25%. Pt follows at [place] [id number] (Dr. Destini Mcconnell). -consult to cardiology  -consult to nephrology  -will hold on diuresis as pt is hyponatremic  -strict Is/Os  -daily weights  -continue home entresto 49-51mg  -continue home metoprolol 25mg daily    Hyponatremia: POA Na 124. Possible etiologies include malignancy vs CHF vs diuretic use.  -hold home torsemide  -consult to nephrology  -trend BMP Q12H  -will get urine lytes    CAD:  -continue home atorvastatin 40mg daily  -hold home ASA 81mg    T2DM: A1C 6/10/20 12.6. Per med rec pt takes NPH/regular 70/30 10u QAM, 8U QPM.  -Holding home insulin 70/30  -SSI Q6H, Lantus 32 Units QHS (weight-based)  -Q6H glucose checks.  -Hypoglycemia protocols ordered. -Goal glucose concentration >70, <140 pre-meal and <180 with all random glucoses. CKD3: POA creatinine 1.35, GFR 53  -continue home KCl 20meq tab BID     Tobacco Use Disorder: Pt has 100 pack year smoking history. -current 2ppd user  -nicotine patch while in the hospital    FEN/GI - NPO. Activity - Out of bed with assistance  DVT prophylaxis - fondaparinux - pt has hx of HIT  GI prophylaxis - Not indicated at this time  Fall prophylaxis - Not indicated at this time. Disposition - Admit to Telemetry. Plan to d/c to Home. Consulting PT, OT and CM  Code Status - Partial. Pt is DNI. Discussed with patient / caregivers. Next of Kin Name and [address]  Spouse - [phone]    Patient Fernanda Flood. will be discussed with Dr. Alexi Chacko.     11:59 AM, 07/22/20  Zelda Mcpherson MD  Family Medicine Resident       For Billing    Chief Complaint   Patient presents with    Shortness of 11 [facility] Problems  Date Reviewed: 7/22/2020          Codes Class Noted POA    Shortness of breath ICD-10-CM: R06.02  ICD-9-CM: 786.05  7/22/2020 Unknown        Neoplasm of lung ICD-10-CM: D49.1  ICD-9-CM: 239.1  7/22/2020 Unknown

## 2020-07-22 NOTE — ED NOTES
TRANSFER - OUT REPORT:    Verbal report given to Mamta Gary RN (name) on Matt Iyer.  being transferred to [facility] [id number] (unit) for routine progression of care       Report consisted of patients Situation, Background, Assessment and   Recommendations(SBAR). Information from the following report(s) SBAR, Kardex, ED Summary, [facility] and Recent Results was reviewed with the receiving nurse. Lines:   Peripheral IV 07/22/20 Right Antecubital (Active)   Site Assessment Clean, dry, & intact 07/22/20 1355   Phlebitis Assessment 0 07/22/20 1355   Infiltration Assessment 0 07/22/20 1355   Dressing Status Clean, dry, & intact 07/22/20 1355   Dressing Type Transparent 07/22/20 1355   Hub Color/Line Status Pink 07/22/20 1355        Opportunity for questions and clarification was provided.       Patient transported with:   Monitor  Registered Nurse

## 2020-07-22 NOTE — PROGRESS NOTES
7/22/2020  1:31 PM  Case management note    Reason for Admission:   SOB, r/o covid 19, new cancer diagnosis  Patient came to ED for chest & back pain and SOB x 1 week. Patient and spouse live in single story home with 3 steps to enter. Patient is independent and drives. [employer] [facility] [place]                   RUR Score:          11%           Plan for utilizing home health: To be evaluated by PT/OT    PCP: First and Last name:  Reinaldo Anaya   Name of Practice:    Are you a current patient: Yes/No: yes   Approximate date of last visit: 7/22/2020   Can you participate in a virtual visit with your PCP: yes                    Current Advanced Directive/Advance Care Plan: no ad, acp note to chart                         Transition of Care Plan:                  1. Home with family assistance  2. PCP follow up  3. Palliative consult  4. AD planning  5.  CM to follow through discharge    Care Management Interventions  Mode of Transport at Discharge: Self  Current Support Network: Lives with Spouse  Confirm Follow Up Transport: Family  The Plan for Transition of Care is Related to the Following Treatment Goals : SOB, new cancer diagnosis, r/o covid 19  Discharge Location  Discharge Placement: Home with family assistance  Juni Panda

## 2020-07-22 NOTE — PROGRESS NOTES
TRANSFER - IN REPORT:    Verbal report received from [place], ED(name) on Fernanda Swenson.  being received from ED (unit) for routine progression of care      Report consisted of patients Situation, Background, Assessment and   Recommendations(SBAR). Information from the following report(s) SBAR, Kardex, ED Summary, Intake/Output and MAR was reviewed with the receiving nurse. Opportunity for questions and clarification was provided. Assessment completed upon patients arrival to unit and care assumed. Pt. DID NOT COME STRAIGHT FROM ER. WENT FOR BIOPSY AND THEN CAME TO UNIT. TRANSFER - IN REPORT:    Verbal report received from [address] [place] [address],[address], RN (name) on Bianca Moraes.  being received from Radiology (unit) for routine post - op      Report consisted of patients Situation, Background, Assessment and   Recommendations(SBAR). Information from the following report(s) Procedure Summary and MAR was reviewed with the receiving nurse. Opportunity for questions and clarification was provided. Assessment completed upon patients arrival to unit and care assumed.

## 2020-07-22 NOTE — PROGRESS NOTES
Spiritual Care Assessment/Progress Note  [address]      NAME: Rebeka Cornell MRN: [id number]  AGE: 81 y.o. SEX: male  Advent Affiliation: Mosque   Language: English     7/22/2020     Total Time (in minutes): 5     Spiritual Assessment begun in [facility] EMERGENCY DEPT through conversation with:         []Patient        [] Family    [] Friend(s)        Reason for Consult: Request by staff     Spiritual beliefs: (Please include comment if needed)     [] Identifies with a juan tradition:         [] Supported by a juan community:            [] Claims no spiritual orientation:           [] Seeking spiritual identity:                [] Adheres to an individual form of spirituality:           [x] Not able to assess:                           Identified resources for coping:      [] Prayer                               [] Music                  [] Guided Imagery     [] Family/friends                 [] Pet visits     [] Devotional reading                         [x] Unknown     [] Other:                                  Interventions offered during this visit: (See comments for more details)    Patient Interventions: Initial visit(Attempted)           Plan of Care:     [] Support spiritual and/or cultural needs    [] Support AMD and/or advance care planning process      [] Support grieving process   [] Coordinate Rites and/or Rituals    [] Coordination with community clergy   [] No spiritual needs identified at this time   [] Detailed Plan of Care below (See Comments)  [] Make referral to Music Therapy  [] Make referral to Pet Therapy     [] Make referral to Addiction services  [] Make referral to [facility]  [] Make referral to Spiritual Care Partner  [] No future visits requested        [x] Follow up visits as needed     Comments: responded to request fro  to the ER. Doctors were gowning up to enter Mr. Rolo De Souza room. Consutled with staff who indicated he was being admitted. Chaplains will follow as able and/or needed.   Visited by: Lucila Sweet., MS., [address] [facility] [address] [place] ([id number])

## 2020-07-22 NOTE — PROGRESS NOTES
Admission Medication Reconciliation:     Information obtained from:    Patient via phone call to room ER 8  RxQuery data available¹:  YES    Comments/Recommendations:   Patient able to confirm name, , allergies, and preferred pharmacy  Updated PTA medication list  The patient has not taken any medications in the last two weeks due to illness. ¹RxQuery pharmacy benefit data reflects medications filled and processed through the patient's insurance, however this data does NOT capture whether the medication was picked up or is currently being taken by the patient. Prior to Admission Medications   Prescriptions Last Dose Informant Taking?   aspirin 81 mg chewable tablet 2020 Self Yes   Sig: Take 81 mg by mouth daily. atorvastatin (LIPITOR) 40 mg tablet 2020 Self No   Sig: TAKE ONE TABLET BY MOUTH ONCE DAILY  Indications: excessive fat in the blood   buPROPion SR (WELLBUTRIN SR) 150 mg SR tablet 2020 Self Yes   Sig: Take 1 Tab by mouth two (2) times a day. ergocalciferol (ERGOCALCIFEROL) 1,250 mcg (50,000 unit) capsule 2020 Self Yes   Sig: Take 1 Cap by mouth every seven (7) days. Indications: vitamin D deficiency (high dose therapy)   insulin NPH/insulin regular (NovoLIN 70/30 U-100 Insulin) 100 unit/mL (70-30) injection 2020 Self Yes   Si Units by SubCUTAneous route Daily (before breakfast). insulin NPH/insulin regular (NovoLIN 70/30 U-100 Insulin) 100 unit/mL (70-30) injection 2020 Self Yes   Sig: 10 Units by SubCUTAneous route Daily (before dinner). metoprolol succinate (TOPROL-XL) 50 mg XL tablet 2020 Self Yes   Sig: Take 0.5 Tabs by mouth daily. potassium chloride (K-DUR, KLOR-CON) 20 mEq tablet 2020 Self Yes   Sig: TAKE 1 TABLET BY MOUTH TWICE DAILY   sacubitriL-valsartan (Entresto) 49-51 mg tab tablet 2020 Self Yes   Sig: Take 1 Tab by mouth two (2) times a day.    torsemide (DEMADEX) 20 mg tablet  Self Yes   Sig: Take 0.5 Tabs by mouth as needed (for shortness of breath or edema). FYI dose decrease by JING Solomon NP on 9/18/19   umeclidinium-vilanteroL (Anoro Ellipta) 62.5-25 mcg/actuation inhaler 7/8/2020 Self Yes   Sig: Take 1 Puff by inhalation daily. Facility-Administered Medications: None         Please contact the main inpatient pharmacy with any questions or concerns at [phone] and we will direct you to the clinical pharmacist covering this patient's care while in-house.    Gayla Dewitt, PharmD, BCPS

## 2020-07-22 NOTE — ED TRIAGE NOTES
Left message advising  Pt arrives with the c/c chest and back pain for about one month and increased shortness of breath x one week, PCP referred pt to ED at this time.

## 2020-07-23 NOTE — PROGRESS NOTES
[id number] [facility] RESIDENCY PROGRAM  PROGRESS NOTE     7/23/2020  PCP: Lizz Morgan MD     Assessment/Plan:     Earl Mcgovern is a 81 y.o. male with PMHx of CHF, CAD, paroxismal a-fib COPD, T2DM, CKD3, and hx of GIB who is admitted for dyspnea and CHF exacerbation. Overnight Events: No acute events overnight. Patient has required 10 mg morphine IV overnight.       Dyspnea 2/2 likely malignant pleural effusion in the setting of COPD: CT chest w/ findings consistent w/ lung neoplasm w/ widespread metastatic disease, small R-sided pleural effusion. Pt underwent liver biopsy 7/22, awaiting pathology results. Lower suspicion for infectious process, however procalcitonin is elevated at 0.95.   -consult to oncology, appreciate recs       - Will need CT Abd/pelvis and brain MRI for staging   -consult to palliative care, appreciate recs  -consider consult to pulmonology for therapeutic thoracentesis if pleural effusion worsens   -atrovent Q4H  -brovana & pulmicort BID  -O2 via nasal canula PRN to maintain O2 sats >88%  -Started Levaquin to cover for possible PNA given elevated procalcitonin and WBC with left shift in the setting of pleural effusion and end-stage COPD  -BCx pending  -Pain management with fentanyl 25 mcg/hr patch and morphine 2 mg IV I2O PRN     Systolic heart failure w/ reduced ejection fraction (20-25%), NYHA Class II: Pt has ICD. POA BNP 10,129. Echo (6/15/20) w/ LVEF 20-25%. Pt follows at [facility] (Dr. Aliyah Valladares). Per chart, patient had not been taking his prescribed medications in the 2 weeks prior to admission.   -consult to cardiology, appreciate recs        -torsemide 20 mg was re-started   -strict Is/Os  -daily weights  -continue home entresto 49-51mg  -continue home metoprolol 25mg daily     Hyponatremia: POA Na 124. Possible etiologies include malignancy (SIADH) vs CHF.   -consult to nephrology, appreciate recs  -torsemide was re-started today  -trend BMP Q12H     CAD:  -continue home atorvastatin 40mg daily  -hold home ASA 81mg     T2DM: A1C 6/10/20 12.6. Per med rec pt takes NPH/regular 70/30 10u QAM, 8U QPM.  -Holding home insulin 70/30  -SSI Q6H, Lantus 32 Units QHS (weight-based)  -Q6H glucose checks.  -Hypoglycemia protocols ordered. -Goal glucose concentration >70, <140 pre-meal and <180 with all random glucoses.     CKD3: Creatinine 1.35, GFR 44  -continue home KCl 20meq tab BID     Tobacco Use Disorder: Pt has 100 pack year smoking history. -current 2ppd user  -nicotine patch while in the hospital     FEN/GI - Cardiac diet   DVT prophylaxis - fondaparinux - pt has hx of HIT  GI prophylaxis - Not indicated at this time  Code Status - Partial. Pt is DNI. Discussed with patient / caregivers. Next of Kin Name and [address],  Spouse - [phone]    Fernanda AMADOR Madan [facility]. discussed with Dr. Jyotsna Lazar. Subjective:   Pt was seen and examined at bedside. Afebrile and hemodynamically stable. Concerns overnight include: continued chest pain and SOB. Patient also reports dizziness, abdominal fullness, and right upper pain associated with the site of his liver biopsy yesterday. Denies fever, chills, nausea, vomiting.      Objective:   Physical examination  Patient Vitals for the past 24 hrs:   Temp Pulse Resp BP SpO2   07/23/20 0516     92 %   07/23/20 0315 98.6 °F (37 °C) 98 16 135/75 92 %   07/22/20 2337     92 %   07/22/20 2300  (!) 105      07/22/20 2226 97.9 °F (36.6 °C) (!) 104 18 128/82 92 %   07/22/20 1955     94 %   07/22/20 1948 97.9 °F (36.6 °C) (!) 106 20 136/83 94 %   07/22/20 1618 97.9 °F (36.6 °C) (!) 108 20 163/89 95 %   07/22/20 1600  (!) 108  138/88 97 %   07/22/20 1555  (!) 109  140/90 98 %   07/22/20 1550  (!) 105 23 133/88 97 %   07/22/20 1545  (!) 106 21 136/87 97 %   07/22/20 1540  (!) 107 22 142/84 98 %   07/22/20 1535  (!) 106 22 140/89 98 %   07/22/20 1530  (!) 106 16 (!) 148/92 99 % 20 1525  (!) 108 25 (!) 145/93 99 %   20 1520  (!) 108 21 (!) 153/100 99 %   20 1518  (!) 106 19 (!) 150/99 99 %   20 1515  (!) 106 20 (!) 152/97 99 %   20 1511  (!) 106  (!) 146/99    20 1400  (!) 104 20 (!) 157/100 93 %   20 1315  (!) 102 22 (!) 153/98 93 %   20 1100  (!) 107 21 (!) 140/91 96 %   20 1030  (!) 108  141/82 95 %   20 1008     96 %   20 0933 98.7 °F (37.1 °C) (!) 109 20 (!) 150/93 98 %      Temp (24hrs), Av.2 °F (36.8 °C), Min:97.9 °F (36.6 °C), Max:98.7 °F (37.1 °C)     O2 Flow Rate (L/min): 3 l/min   O2 Device: Nasal cannula      General:   Alert, cooperative, no acute distress, sitting upright in bedside chair    Head:   Atraumatic   Eyes:   Conjunctivae clear   ENT:  Oral mucosa normal   Neck:  Supple, trachea midline   Lungs:   Wheezing noted in right upper lobe, bibasilar crackles with diminished breath sounds in right lung base. Heart:   Normal rate, regular rhythm, no murmur   Abdomen:    Normoactive bowel sounds. Moderate abdominal distention, no tenderness, rebound, or guarding    Extremities:  No edema    Pulses:  Symmetric all extremities   Skin:  Warm and dry    No rashes or lesions   Neurologic:  Alert and oriented x4   No focal deficits     Data Review:     CBC:  Recent Labs     20  0957   WBC 7.6   HGB 16.0   HCT 46.6        Metabolic Panel:  Recent Labs     20  1805 20  0957   * 124*   K 4.0 3.9   CL 92* 90*   CO2 24 23   BUN 20 20   CREA 1.45* 1.35*   * 158*   CA 8.6 9.0   ALB  --  3.2*   TBILI  --  0.5   ALT  --  31     Micro:  Lab Results   Component Value Date/Time    Culture result: MRSA NOT PRESENT 2020 11:45 AM    Culture result:  2020 11:45 AM         Screening of patient nares for MRSA is for surveillance purposes and, if positive, to facilitate isolation considerations in high risk settings.  It is not intended for automatic decolonization interventions per se as regimens are not sufficiently effective to warrant routine use. Culture result: MRSA NOT PRESENT 11/14/2019 03:48 PM    Culture result:  11/14/2019 03:48 PM         Screening of patient nares for MRSA is for surveillance purposes and, if positive, to facilitate isolation considerations in high risk settings. It is not intended for automatic decolonization interventions per se as regimens are not sufficiently effective to warrant routine use. Imaging:  Cta Chest W Or W Wo Cont    Result Date: 7/22/2020  Clinical history: r/o pe INDICATION:   r/o pe COMPARISON: 1/19/2017 TECHNIQUE: CT of the chest with  IV contrast , Isovue-370 is performed. Axial images from the thoracic inlet to the level of the upper abdomen are obtained. Manual post-processing of the images and coronal reformatting is also performed. CT dose reduction was achieved through use of a standardized protocol tailored for this examination and automatic exposure control for dose modulation. Multiplanar reformatted imaging was performed. Sagittal and coronal reformatting. 3-D Postprocessing of imaging was performed. 3-D MIP reconstructed images were obtained in the coronal plane. FINDINGS: There is a large right upper lobe mass lesion. Extensive mediastinal lymphadenopathy. Numerous hepatic hypodensities consistent with metastatic disease. Mesenteric and retroperitoneal lymphadenopathy as well. There is a moderate to large right-sided pleural effusion. There is significant attenuation of pulmonary arterial vessels extending to the right upper lobe. Cardiac pacer in place. Left basilar atelectasis. Cardiomegaly is mild. . The aorta is normal in course and caliber. Hydropic gallbladder. IMPRESSION: Imaging findings consistent with lung neoplasm with widespread metastatic disease. Hilar and mediastinal lymphadenopathy. Hepatic metastatic metastases. Retroperitoneal and mesenteric lymphadenopathy as well.  There is significant attenuation of pulmonary arterial vessels extending to the right upper lobe without pulmonary embolism identified. Small possibly malignant right-sided pleural effusion. Hepatic mass lesions are amenable to percutaneous sampling. The findings were called to Maddi Concepcion on 7/22/2020 at 10:50 AM by Dr. Kushal Fuchs. [address] [facility]    Result Date: 7/22/2020  CLINICAL HISTORY: Liver mass INDICATION: Liver mass FINDINGS: The patient was informed of the risks and benefits of the procedure. Risks including the risk of bleeding and the need for possible post biopsy embolization were discussed with the patient. After obtaining informed consent, the patient was brought to the ultrasonography suite and placed in the supine position. Ultrasonography through the abdomen was obtained so that a liver biopsy could be performed. The skin overlying the region of the mass was sterilely prepped and draped. One percent lidocaine was then injected into the subcutaneous tissues overlying the lesion. Next, a 18-gauge Temno core biopsy needle was advanced into the lesion utilizing intermittent ultrasonographic guidance. The patient was hemodynamically stable after the completion of the procedure and did not report any significant symptom. PROCEDURE:Successful ultrasound-guided liver mass biopsy : Kvng Fuchs MD. PROCEDURE: Ultrasound-guided liver mass biopsy PREPROCEDURAL DIAGNOSIS: Liver lesion POSTPROCEDURAL DIAGNOSIS: Liver lesion ESTIMATED BLOOD LOSS: None. SPECIMENS REMOVED: 6, 18-gauge Core biopsy specimens COMPLICATIONS: None. IMPRESSION: Successful ultrasound-guided liver mass biopsy preliminary results consistent with neoplasm. Please see detailed pathology report for further information. Xr Chest Port    Result Date: 7/22/2020  EXAM: XR CHEST PORT INDICATION: Shortness of breath COMPARISON: 11/14/2019 FINDINGS: A portable AP radiograph of the chest was obtained at 1009 hours.  The patient is on a cardiac monitor. There is a pacemaker in place without lead fracture. Asymmetric right-sided airspace disease is noted with a small right pleural effusion. The lungs are hyperinflated. IMPRESSION: New asymmetric right-sided airspace disease with right pleural effusion    Medications reviewed  Current Facility-Administered Medications   Medication Dose Route Frequency    sodium chloride (NS) flush 5-40 mL  5-40 mL IntraVENous Q8H    sodium chloride (NS) flush 5-40 mL  5-40 mL IntraVENous PRN    polyethylene glycol (MIRALAX) packet 17 g  17 g Oral DAILY PRN    nicotine (NICODERM CQ) 21 mg/24 hr patch 1 Patch  1 Patch TransDERmal DAILY    fondaparinux (ARIXTRA) injection 2.5 mg  2.5 mg SubCUTAneous Q24H    arformoteroL (BROVANA) neb solution 15 mcg  15 mcg Nebulization BID RT    budesonide (PULMICORT) 500 mcg/2 ml nebulizer suspension  500 mcg Nebulization BID RT    ipratropium (ATROVENT) 0.02 % nebulizer solution 0.5 mg  0.5 mg Nebulization Q4H RT    sacubitriL-valsartan (ENTRESTO) 49-51 mg tablet 1 Tab  1 Tab Oral BID    metoprolol succinate (TOPROL-XL) XL tablet 25 mg  25 mg Oral DAILY    potassium chloride SR (KLOR-CON 10) tablet 20 mEq  20 mEq Oral BID    atorvastatin (LIPITOR) tablet 40 mg  40 mg Oral DAILY    ergocalciferol capsule 50,000 Units  50,000 Units Oral Q7D    glucose chewable tablet 16 g  4 Tab Oral PRN    dextrose (D50W) injection syrg 12.5-25 g  25-50 mL IntraVENous PRN    glucagon (GLUCAGEN) injection 1 mg  1 mg IntraMUSCular PRN    insulin glargine (LANTUS) injection 32 Units  0.4 Units/kg SubCUTAneous QHS    insulin lispro (HUMALOG) injection   SubCUTAneous Q6H    morphine injection 2 mg  2 mg IntraVENous Q2H PRN         Signed:   Minerva Magallon DO  Family Medicine Resident      Attending note:    Attending note to follow

## 2020-07-23 NOTE — TELEPHONE ENCOUNTER
Returned call to patient. Patient reports he had an xray which showed stage IV cancer. Patient will be having additional tests with oncology. Follow up appt with [facility] 8/4/2020.  PAULETTE Lott

## 2020-07-23 NOTE — PROGRESS NOTES
OCCUPATIONAL THERAPY EVALUATION/DISCHARGE  Patient: Earl Mcgovern (80 y.o. male)  Date: 7/23/2020  Primary Diagnosis: Shortness of breath [R06.02]  Neoplasm of lung [D49.1]       Precautions:        ASSESSMENT  Based on the objective data described below, the patient presents with independence with ADL tasks and transfers. Patient at baseline for task and currently does not require acute care OT services. Patient spouse in room and both patient and spouse agree with no further therapy needs. Will complete orders. .      Functional Outcome Measure: The patient scored 90/100 on the Barthel Index  outcome measure. Other factors to consider for discharge: none     PLAN :    Recommendation for discharge: (in order for the patient to meet his/her long term goals)  No skilled occupational therapy/ follow up rehabilitation needs identified at this time. This discharge recommendation:  Has been made in collaboration with the attending provider and/or case management    IF patient discharges home will need the following DME: none       SUBJECTIVE:   Patient stated I thank you for coming in.    OBJECTIVE DATA SUMMARY:   HISTORY:   Past Medical History:   Diagnosis Date    Arthritis     hands/fingers-OSTEO    CAD (coronary artery disease)     involving native coronary artery of native heart without angina pectoris    Cardiomyopathy ([facility] [address] 01/20/2017    A. Echo (1/19/17):  EF 5-10% with severe GHK,. Mildly dil LA. Mild TR. PASP 46./systemic cardiomyopathy    Chronic kidney disease     Chronic obstructive pulmonary disease (HCC)     Chronic renal insufficiency 03/06/2017    Chronic systolic congestive heart failure, NYHA class 3 ([address] 1/8/2019    Claudication ([address] 10/9/2018    Congestive heart failure ([address]     Diabetes mellitus ([facility] [address] 3/6/2017    IDDM    Dyslipidemia 3/6/2017    A. FLP (1/19/17): Tot 120, , HDL 19, LDL 76 (no Rx).     H/O: GI bleed 3/6/2017    Hepatitis C 3/6/2017    Hypokalemia     ICD (implantable cardioverter-defibrillator) in place     Ill-defined condition     flu 1/2018     Neuropathy 11/19/2018    Noncompliance with medication regimen 9/11/2018    Paroxysmal atrial fibrillation ([facility] [address] 3/6/2017    Peripheral vascular disease ([facility] [address] 9/18/2017    A. RICKY (3/20/17): Right 0.58, Left 0.56.  Seizures ([facility] [address]     WITH HEPARIN    Tobacco dependence syndrome 10/9/2018    Vitamin D deficiency 6/4/2018    Vitamin deficiency 1/29/2019     Past Surgical History:   Procedure Laterality Date    CARDIAC SURG PROCEDURE UNLIST      CORONARY STENTX 3 2 CORONARY, & 1 FEMORAL    COLONOSCOPY N/A 2/17/2017    COLONOSCOPY performed by Elsy Bennett. Connie Archuleta MD at [address] COLONOSCOPY N/A 2/5/2018    COLONOSCOPY performed by Elsy Bennett. Connie Archuleta MD at [facility]. N/A 10/22/2019    COLONOSCOPY performed by Dedrick Manjarrez MD at [facility] ENDOSCOPY    HX CORONARY STENT PLACEMENT      LM, RCA x 2     HX ENDOSCOPY  10/22/2019    [facility]     HX GI      colonoscopy x 3 - last 2/2017 - polyp    HX IMPLANTABLE CARDIOVERTER DEFIBRILLATOR      HX OTHER SURGICAL      HX PACEMAKER      AICD    VASCULAR SURGERY PROCEDURE UNLIST  2017    STENT - RIGHT FEMORAL        Prior Level of Function/Environment/Context: independent  Expanded or extensive additional review of patient history:   Home Situation  Home Environment: Private residence  One/Two Story Residence: One story  Living Alone: No  Support Systems: Spouse/Significant Other/Partner, Child(duane)  Patient Expects to be Discharged to[de-identified] Private residence  Current DME Used/Available at Home: Cane, straight    Hand dominance: Right    EXAMINATION OF PERFORMANCE DEFICITS:  Cognitive/Behavioral Status:  Neurologic State: Alert  Orientation Level: Oriented X4  Cognition: Appropriate decision making; Appropriate for age attention/concentration; Appropriate safety awareness; Follows commands             Skin: intact as seen    Edema: none noted     Hearing: Auditory  Auditory Impairment: None    Vision/Perceptual:                                     Range of Motion:  AROM: Within functional limits  PROM: Within functional limits                      Strength:  Strength: Within functional limits                Coordination:  Coordination: Within functional limits            Tone & Sensation:   normal tone, sensation intact                         Balance:  Sitting: Intact  Standing: Intact; Without support    Functional Mobility and Transfers for ADLs:  Bed Mobility:  Rolling: Independent  Supine to Sit: Independent  Sit to Supine: Independent  Scooting: Independent    Transfers:  Sit to Stand: Independent  Stand to Sit: Independent  Bed to Chair: Independent  Bathroom Mobility: Independent  Toilet Transfer : Independent    ADL Assessment:  Feeding: Independent    Oral Facial Hygiene/Grooming: Independent    Bathing: Independent    Upper Body Dressing: Independent    Lower Body Dressing: Independent    Toileting: Independent                ADL Intervention and task modifications:                                          Therapeutic Exercise:  . Functional Measure:  Barthel Index:    Bathin  Bladder: 10  Bowels: 10  Groomin  Dressing: 10  Feeding: 10  Mobility: 15  Stairs: 0  Toilet Use: 10  Transfer (Bed to Chair and Back): 15  Total: 90/100        The Barthel ADL Index: Guidelines  1. The index should be used as a record of what a patient does, not as a record of what a patient could do. 2. The main aim is to establish degree of independence from any help, physical or verbal, however minor and for whatever reason. 3. The need for supervision renders the patient not independent. 4. A patient's performance should be established using the best available evidence. Asking the patient, friends/relatives and nurses are the usual sources, but direct observation and common sense are also important. However direct testing is not needed.   5. Usually the patient's performance over the preceding 24-48 hours is important, but occasionally longer periods will be relevant. 6. Middle categories imply that the patient supplies over 50 per cent of the effort. 7. Use of aids to be independent is allowed. Azalea Malagon., Barthel, D.W. (9415). Functional evaluation: the Barthel Index. [facility] (14)2. LUCIANA Stuart, Simon Jacobo., Dacia Lopez., [place], [address] (1999). Measuring the change indisability after inpatient rehabilitation; comparison of the responsiveness of the Barthel Index and Functional [place] Measure. Journal of Neurology, Neurosurgery, and Psychiatry, 66(4), 782-433. Katlin Quinonez, N.[place].A, MOJGAN Rangel, & Lai Silveira M.A. (2004.) Assessment of post-stroke quality of life in cost-effectiveness studies: The usefulness of the Barthel Index and the EuroQoL-5D. Quality of Life Research, 15, 787-62         Occupational Therapy Evaluation Charge Determination   History Examination Decision-Making   LOW Complexity : Brief history review  LOW Complexity : 1-3 performance deficits relating to physical, cognitive , or psychosocial skils that result in activity limitations and / or participation restrictions  LOW Complexity : No comorbidities that affect functional and no verbal or physical assistance needed to complete eval tasks       Based on the above components, the patient evaluation is determined to be of the following complexity level: LOW   Pain Rating:      Activity Tolerance:   Good for ADL tasks, complains of fatigue with increased ambulation/home tasks  Please refer to the flowsheet for vital signs taken during this treatment. After treatment patient left in no apparent distress:    Supine in bed, Call bell within reach and Caregiver / family present    COMMUNICATION/EDUCATION:   The patients plan of care was discussed with: Physical therapist and Registered nurse.      Thank you for this referral.  Sulaiman Glass Guero, OTR/L  Time Calculation: 20 mins

## 2020-07-23 NOTE — PROGRESS NOTES
Device form for MRI safe cardiac device completed and signed by Dr Barbara Her faxed to MRI- confirmation received  MRI aware

## 2020-07-23 NOTE — PROCEDURES
[address]  [address]  [address] [address]  [address]  [phone]    Elicia Miranda.  1950  [id number]      Date of Procedure:7/23/2020    Preoperative diagnosis: Pleural effusion    Procedure: Thoracentesis    Indication: Pleural effusion    :  Marlen Saenz MD    Assistant(s): None    Anesthesia/Sedation:  None      Procedure Details:  After infomed consent was obtained for the procedure, with all risks and benefits of procedure explained to the patient, he was placed in the sitting position at the edge of the bed leaning over a table. The US was used to localize a pocket of fluid amenable to drainage. The site was marked and cleaned and prepped in the usual fashion. The subcutaneous tissue was numbed with lidocaine and the finder needle was advanced under continuous aspiration until fluid was aspirated. The needle was retracted and the tract was numbed. The thoracentesis catheter was advanced over the needle under continuous aspiration until fluid was aspirated. The catheter was fully advanced as the needle was retracted. 1000cc of serous fluid was aspirated until fluid stopped flowing. Fluid was sent for routine studies. Complications:   None noted; patient tolerated the procedure well.     EBL:  Minimal           Impression:   S/P right sided thoracentesis    Recommendations:   Await CXR and study results      Ousmane Phelps MD  7/23/2020  4:04 PM

## 2020-07-23 NOTE — NURSE NAVIGATOR
Chart reviewed by Heart Failure Nurse Navigator. Heart Failure database completed. Patient admitted with increased shortness of breath. Patient with large lung mass, evidence of extensive metastatic disease, and large pleural effusion. EF:  20 by 25%. ACEi/ARB/ARNi: Entresto 49/51 mg BID. BB: Metoprolol succinate 25 mg daily. Aldosterone Antagonist: Contraindicated due to hyperkalemia per last office visit note from Dr. Edmundo Ponce. Obstructive Sleep Apnea Screening:   STOP-BANG score:   Referred to Sleep Medicine:     CRT Not indicated. NYHA Functional Class II. Heart Failure Teach Back in Patient Education. Heart Failure Avoiding Triggers on Discharge Instructions. Cardiologist: Dr. Benji Pretty    Advanced HF Cardiologist: Dr. Kelley Roberson discharge follow up phone call to be made within 48-72 hours of discharge.

## 2020-07-23 NOTE — ACP (ADVANCE CARE PLANNING)
Responded to request received from Case Management for and Advance Medical Directive (AMD) on 4 Post SUrg Ortho. Mr. Cleveland Cherry goes by "[employer]. He indicated he is pretty sure he would want the AMD he started but did not finish. I had consulted with Palliative Care prior to visiting Mr. Cleveland Cherry. They indicated they would address the AMD.  Assured Spud that the Palliative Care team planned on visiting and will continue the AMD conversation. Consulted with Palliative Care NP [facility].     Visited by: Quintin Nguyen., MS., [address] [facility] [place] ([id number])

## 2020-07-23 NOTE — PROGRESS NOTES
Spiritual Care Assessment/Progress Note  [address]      NAME: Bryant Loomis MRN: [id number]  AGE: 81 y.o. SEX: male  Jewish Affiliation: Zoroastrianism   Language: English     7/23/2020     Total Time (in minutes): 61     Spiritual Assessment begun in [facility] 4M POST SURG ORT 2 through conversation with:         [x]Patient        [x] Family    [] Friend(s)        Reason for Consult: Request by staff     Spiritual beliefs: (Please include comment if needed)     [x] Identifies with a juan tradition:    Hoahaoism     [] Supported by a juan community:            [] Claims no spiritual orientation:           [] Seeking spiritual identity:                [] Adheres to an individual form of spirituality:           [] Not able to assess:                           Identified resources for coping:      [x] Prayer                               [] Music                  [] Guided Imagery     [x] Family/friends                 [] Pet visits     [] Devotional reading                         [] Unknown     [] Other:                                           Interventions offered during this visit: (See comments for more details)    Patient Interventions: Initial visit(Attempted)           Plan of Care:     [] Support spiritual and/or cultural needs    [] Support AMD and/or advance care planning process      [] Support grieving process   [] Coordinate Rites and/or Rituals    [] Coordination with community clergy   [] No spiritual needs identified at this time   [] Detailed Plan of Care below (See Comments)  [] Make referral to Music Therapy  [] Make referral to Pet Therapy     [] Make referral to Addiction services  [] Make referral to [facility]  [] Make referral to Spiritual Care Partner  [] No future visits requested        [x] Follow up visits as needed     Comments: Responded to request received from Case Management for and Advance Medical Directive (AMD) on 4 Post SUrg Ortho.   Mr. Cleveland Cherry goes by Edwar.  He indicated he is pretty sure he would want the AMD he started but did not finish. I had consulted with Palliative Care prior to visiting Mr. Rita Batista. They indicated they would address the AMD.  Assured Edwar that the Palliative Care team planned on visiting and will continue the AMD conversation. Consulted with Palliative Care NP [facility]. Edwar shared he has a strong Sabianism juan and belief in God. He does not attend Jew much though he has conversations with God all the time. He share he is not afraid to die, even though he is wanting to stay here for a while longer. His wife of 23 years is Heriberto Main.  Provided a listening ear as they both shared some of their life. Edwar asked for prayer for continued strength with God for him and his family. Provided spiritual presence and prayer. Advised of  Availability.   Visited by: Lashay Eugene., MS., [address] [facility] [address] [place] ([id number])

## 2020-07-23 NOTE — PROGRESS NOTES
PHYSICAL THERAPY EVALUATION/DISCHARGE  Patient: Marilu Gonzales (80 y.o. male)  Date: 7/23/2020  Primary Diagnosis: Shortness of breath [R06.02]  Neoplasm of lung [D49.1]       Precautions: universal         ASSESSMENT  Based on the objective data described below, the patient presents with independent with ambulation without assistive device and independent with all functional mobility. Patient at his base line level of function, spouse in the room during evaluation also agreed patient does not need any therapy. Reviewed all safety precaution and home exercise program with the patient, verbalized understanding. Skilled physical therapy is not indicated at this time. Functional Outcome Measure: The patient scored 90/100 on the barthel nindex outcome measure. Other factors to consider for discharge: none     Further skilled acute physical therapy is not indicated at this time. PLAN :  Recommendation for discharge: (in order for the patient to meet his/her long term goals)  No skilled physical therapy/ follow up rehabilitation needs identified at this time. This discharge recommendation:  Has been made in collaboration with the attending provider and/or case management    IF patient discharges home will need the following DME: none       SUBJECTIVE:   Patient stated I can walk.     OBJECTIVE DATA SUMMARY:   HISTORY:    Past Medical History:   Diagnosis Date    Arthritis     hands/fingers-OSTEO    CAD (coronary artery disease)     involving native coronary artery of native heart without angina pectoris    Cardiomyopathy ([facility] [address] 01/20/2017    A. Echo (1/19/17):  EF 5-10% with severe GHK,. Mildly dil LA. Mild TR.   PASP 46./systemic cardiomyopathy    Chronic kidney disease     Chronic obstructive pulmonary disease (HCC)     Chronic renal insufficiency 03/06/2017    Chronic systolic congestive heart failure, NYHA class 3 ([address] 1/8/2019    Claudication ([facility] [address] 10/9/2018    Congestive heart failure ([facility] [address]     Diabetes mellitus ([facility] [address] 3/6/2017    IDDM    Dyslipidemia 3/6/2017    A. FLP (1/19/17): Tot 120, , HDL 19, LDL 76 (no Rx).  H/O: GI bleed 3/6/2017    Hepatitis C 3/6/2017    Hypokalemia     ICD (implantable cardioverter-defibrillator) in place     Ill-defined condition     flu 1/2018     Neuropathy 11/19/2018    Noncompliance with medication regimen 9/11/2018    Paroxysmal atrial fibrillation ([facility] [address] 3/6/2017    Peripheral vascular disease ([facility] [address] 9/18/2017    A. RICKY (3/20/17): Right 0.58, Left 0.56.  Seizures ([facility] [address]     WITH HEPARIN    Tobacco dependence syndrome 10/9/2018    Vitamin D deficiency 6/4/2018    Vitamin deficiency 1/29/2019     Past Surgical History:   Procedure Laterality Date    CARDIAC SURG PROCEDURE UNLIST      CORONARY STENTX 3 2 CORONARY, & 1 FEMORAL    COLONOSCOPY N/A 2/17/2017    COLONOSCOPY performed by Peterson Xavier. Angela Mcbride MD at [address] COLONOSCOPY N/A 2/5/2018    COLONOSCOPY performed by Peterson Xavier. Angela Mcbride MD at [address] COLONOSCOPY N/A 10/22/2019    COLONOSCOPY performed by Geena Patel MD at [address] [place] [address], RCA x 2     HX ENDOSCOPY  10/22/2019    [facility]     HX GI      colonoscopy x 3 - last 2/2017 - polyp    HX IMPLANTABLE CARDIOVERTER DEFIBRILLATOR      HX OTHER SURGICAL      HX PACEMAKER      AICD    VASCULAR SURGERY PROCEDURE UNLIST  2017    STENT - RIGHT FEMORAL        Prior level of function: Independent community ambulator without assistive device.   Personal factors and/or comorbidities impacting plan of care:     Home Situation  Home Environment: Private residence  One/Two Story Residence: One story  Living Alone: No  Support Systems: Spouse/Significant Other/Partner, Child(duane)  Patient Expects to be Discharged to[de-identified] Private residence  Current DME Used/Available at Home: Cane, straight    EXAMINATION/PRESENTATION/DECISION MAKING:   Critical Behavior:  Neurologic State: Alert  Orientation Level: Oriented X4  Cognition: Appropriate decision making, Appropriate for age attention/concentration, Appropriate safety awareness, Follows commands     Hearing: Auditory  Auditory Impairment: None    Range Of Motion:  AROM: Within functional limits           PROM: Within functional limits           Strength:    Strength: Within functional limits                    Tone & Sensation:                                  Coordination:  Coordination: Within functional limits  Vision:      Functional Mobility:  Bed Mobility:  Rolling: Independent  Supine to Sit: Independent  Sit to Supine: Independent  Scooting: Independent  Transfers:  Sit to Stand: Independent  Stand to Sit: Independent  Stand Pivot Transfers: Independent     Bed to Chair: Independent              Balance:   Sitting: Intact  Standing: Intact; Without support  Ambulation/Gait Training:  Distance (ft): 25 Feet (ft)(ad amelie)     Ambulation - Level of Assistance: Independent     Gait Description (WDL): Within defined limits                          Therapeutic Exercises:    Instructed patient to continue active range of motion exercise on both legs while up on chair or on bed. Functional Measure:  Barthel Index:    Bathin  Bladder: 10  Bowels: 10  Groomin  Dressing: 10  Feeding: 10  Mobility: 15  Stairs: 0  Toilet Use: 10  Transfer (Bed to Chair and Back): 15  Total: 90/100       The Barthel ADL Index: Guidelines  1. The index should be used as a record of what a patient does, not as a record of what a patient could do. 2. The main aim is to establish degree of independence from any help, physical or verbal, however minor and for whatever reason. 3. The need for supervision renders the patient not independent. 4. A patient's performance should be established using the best available evidence. Asking the patient, friends/relatives and nurses are the usual sources, but direct observation and common sense are also important. However direct testing is not needed. 5. Usually the patient's performance over the preceding 24-48 hours is important, but occasionally longer periods will be relevant. 6. Middle categories imply that the patient supplies over 50 per cent of the effort. 7. Use of aids to be independent is allowed. Donna House., Barthel, D.W. ([id number]). Functional evaluation: the Barthel Index. [address] [facility] (14)2. LUCIANA Vogt, Orville Ramirez., Wellington Figueroa., Rosa Donaldson, [facility] (). Measuring the change indisability after inpatient rehabilitation; comparison of the responsiveness of the Barthel Index and Functional [place] Measure. Journal of Neurology, Neurosurgery, and Psychiatry, 66(4), 030-131. CORRIE Ruvalcaba, MOJGAN Rangel, & Harmony Pinzon, M.A. (2004.) Assessment of post-stroke quality of life in cost-effectiveness studies: The usefulness of the Barthel Index and the EuroQoL-5D. Quality of Life Research, 15, 841-27          Physical Therapy Evaluation Charge Determination   History Examination Presentation Decision-Making   LOW Complexity : Zero comorbidities / personal factors that will impact the outcome / POC LOW Complexity : 1-2 Standardized tests and measures addressing body structure, function, activity limitation and / or participation in recreation  LOW Complexity : Stable, uncomplicated  Other outcome measures barthel  LOW       Based on the above components, the patient evaluation is determined to be of the following complexity level: LOW     Pain Ratin/10    Activity Tolerance:   Good  Please refer to the flowsheet for vital signs taken during this treatment. After treatment patient left in no apparent distress:   Supine in bed, Heels elevated for pressure relief, Call bell within reach, Caregiver / family present and Side rails x 3    COMMUNICATION/EDUCATION:   The patients plan of care was discussed with: Occupational therapist and Registered nurse.      Fall prevention education was provided and the patient/caregiver indicated understanding. Thank you for this referral.  Amira Cook PT ,[facility].    Time Calculation: 28 mins

## 2020-07-23 NOTE — CONSULTS
Palliative Medicine Consult  [place]: [phone] ([phone])    Patient Name: Angel Fulton. YOB: 1950    Date of Initial Consult: 7/23/2020  Reason for Consult: Goals of care discussion  Requesting Provider: Dr. Atkinson  Primary Care Physician: Itzel Heck MD     SUMMARY:   Angel Puente is a 81 y.o. with a past history of ischemic cardiomyopathy, CHF, EF 20-25%, A. fib, advanced PAD, CAD, AICD, COPD, + active tobacco use, remote history of polysubstance abuse, ho was admitted on 7/22/2020 from PCPs office with a diagnosis of pleural effusion and new lung mass. Current medical issues leading to Palliative Medicine involvement include: Goals of care discussion and assistance with pain management in a patient who is newly diagnosed with what is likely widely metastatic lung cancer. Patient presented to the ER with CC of worsening chest and back pain x1 month, with increased shortness of breath x1 week. In ER patient was noted to be alert and oriented. He was tachypneic and tachycardic. CXR + small pleural effusion and asymmetrical right airspace disease. CTA chest + lung neoplasm with widely spread metastatic disease, hilar and mediastinal lymph and adenopathy hepatic mets and retroperitoneal and mesenteric lymphadenopathy. Patient admitted. Course of hospitalization: Patient has had severe uncontrolled pain resulting in multiple adjustments to pain treatment plan. 7/12 underwent liver biopsy. 7/23 bone scan +Right hilar mass compatible with malignancy, no bone lesions identified. Oncology team Dr. Meme Fontanez and Helen Forrester NP following, ordered MRI for staging and treatment recommendations/options. Psychosocial: Originally from [place].  x2,  to current wife x25 years. He has 3 children from previous marriage, current wife also has children from her previous marriage. Multiple grandchildren. PALLIATIVE DIAGNOSES:   1.  Advance care planning discussion  2. Goals of care discussion  3. DNR discussion  4. Pain, cancer related  Poor appetite     PLAN:   1. Prior to visit I completed extensive chart review. I also spoke with oncology NP Kelly Aguila,  Dr. Fallon hunt/ family medicine resident and   Glenda Hurd who met with patient earlier in day. 2. I met with patient and his wife. Introduced myself and role of palliative medicine. Assessed his understanding of hospitalization. 3. Advanced care planning discussion-patient has a partially completed AMD in the EMR, for which he signed but had no witnesses. I made a copy for him to review, we agreed that I would return tomorrow 7/24, to complete a new AMD.  4. ACP portion of EMR updated  5. DNR discussion-patient has a partial CODE STATUS, indicates that he would want chest compressions and shock, but does not want to be intubated. He has a durable DNR in the EMR. We will plan to readdress CODE STATUS during next visit. 6. Goals of care discussion-ultimately patient wants to return home, however, in regards to treatment, patient does not yet know what his options are. Once MRI resulted and oncology has reviewed and made treatment recommendations, palliative team will follow-up with [address] discussion. 7. Pain due to cancer-patient reported pain 0 out of 10 at rest, stated this is a significant improvement, that it had been 10 out of 10. Patient does tell me that when he moves a particular way, he does get sharp pain under her right shoulder blade. See palliative ESAS for detailed information regarding pain. · Current pain regimen includes:   Fentanyl 25 mcg patch, which was just  Started today,  7/23 at 0900, only had on x 8 hours at time of visit . He also has order for Morphine IR 2mg IV every 2 hours as needed. Over the past 24 hours, patient has received a total of 22mg of IV Morphine (he has had multiple other prn Morphine orders that have since been discontinued). .  · I discontinued the fentanyl patch and ordered morphine IR 15 mg orally every 6 hours scheduled. He for breakthrough pain he still has the morphine 2 mg IV every 2 hours as needed. · Team will follow up tomorrow on effectiveness of adjustments  8. Patient would benefit from outpatient palliative medicine services after discharge. Patient agreed to referral.    9. Poor appetite-patient reported appetite has diminished, also noted to have had intentional 19 pound weight loss over the past couple of months. We will continue to monitor  10. Initial consult note routed to primary continuity provider and/or primary health care team members  6. Communicated plan of care with: Palliative IDT, [facility] Team, Dr. Prashanth Avila / TREATMENT PREFERENCES:     GOALS OF CARE:       TREATMENT PREFERENCES:   Code Status: Partial Code    Advance Care Planning:  [x] [facility] Interdisciplinary Team has updated the ACP Navigator with Health Care Decision Maker and Patient Capacity      Primary Decision Maker: Yecenia Daly - [phone]  Advance Care Planning 7/23/2020   Patient's Healthcare Decision Maker is: Named in scanned ACP document   Primary Decision Maker Name -   Primary Decision Maker Phone Number -   Primary Decision Maker Relationship to Patient -   Confirm Advance Directive Yes, on file   Patient Would Like to Complete Advance Directive -   Does the patient have other document types -             Other Instructions: Other:    As far as possible, the palliative care team has discussed with patient / health care proxy about goals of care / treatment preferences for patient.      HISTORY:     History obtained from: Medical records/patient    CHIEF COMPLAINT: Denies    HPI/SUBJECTIVE:    The patient is:   [x] Verbal and participatory  [] Non-participatory due to:     Patient reported pain at rest 0/10, stated significant improvement from the constant 10 out of 10 pain he had been experiencing. Patient stated over the past couple months he had had intentional weight loss, however now he reports poor appetite. Patient acknowledges fatigue and weakness. Clinical Pain Assessment (nonverbal scale for severity on nonverbal patients):   Clinical Pain Assessment  Severity: 0  Location: under right shoulder blade  Character: burning ache  Duration: weeks  Effect: unable to get comfortable, unable to sleep  Factors: movement  Frequency: now intermittent, had been continuous          Duration: for how long has pt been experiencing pain (e.g., 2 days, 1 month, years)  Frequency: how often pain is an issue (e.g., several times per day, once every few days, constant)     FUNCTIONAL ASSESSMENT:     Palliative Performance Scale (PPS):  PPS: 40       PSYCHOSOCIAL/SPIRITUAL SCREENING:     Palliative IDT has assessed this patient for cultural preferences / practices and a referral made as appropriate to needs (Cultural Services, Patient Advocacy, Ethics, etc.)    Any spiritual / Evangelical concerns:  [] Yes /  [x] No    Caregiver Burnout:  [] Yes /  [x] No /  [] No Caregiver Present      Anticipatory grief assessment:   [x] Normal  / [] Maladaptive       ESAS Anxiety: Anxiety: 0    ESAS Depression: Depression: 0        REVIEW OF SYSTEMS:     Positive and pertinent negative findings in ROS are noted above in HPI. The following systems were [x] reviewed / [] unable to be reviewed as noted in HPI  Other findings are noted below. Systems: constitutional, ears/nose/mouth/throat, respiratory, gastrointestinal, genitourinary, musculoskeletal, integumentary, neurologic, psychiatric, endocrine. Positive findings noted below.   Modified ESAS Completed by: provider   Fatigue: 4 Drowsiness: 0   Depression: 0 Pain: 0   Anxiety: 0 Nausea: 0   Anorexia: 4 Dyspnea: 2                    PHYSICAL EXAM:     From RN flowsheet:  Wt Readings from Last 3 Encounters:   07/23/20 184 lb 11.9 oz (83.8 kg)   06/23/20 189 lb 6.4 oz (85.9 kg)   06/15/20 191 lb 2.2 oz (86.7 kg)     Blood pressure 115/76, pulse 88, temperature 98.5 °F (36.9 °C), resp. rate 16, height 5' 10\" (1.778 m), weight 184 lb 11.9 oz (83.8 kg), SpO2 90 %. Pain Scale 1: Numeric (0 - 10)  Pain Intensity 1: 0  Pain Onset 1: acute  Pain Location 1: Shoulder  Pain Orientation 1: Right  Pain Description 1: Aching  Pain Intervention(s) 1: Medication (see MAR)  Last bowel movement, if known:     Constitutional: Appears older than stated age, awake, alert, pleasant, NAD  Eyes: pupils equal, anicteric  ENMT: no nasal discharge, moist mucous membranes  Cardiovascular: regular rhythm, distal pulses intact  Respiratory: breathing not labored, symmetric  Gastrointestinal: soft non-tender, +bowel sounds  Musculoskeletal: no deformity, no tenderness to palpation  Skin: warm, dry  Neurologic: following commands, moving all extremities  Psychiatric: full affect, no hallucinations  Other:       HISTORY:     Principal Problem:    Shortness of breath (7/22/2020)    Active Problems:    Presence of Watchman left atrial appendage closure device (11/21/2019)      PAD (peripheral artery disease) ([facility] [address] (12/16/2019)      Ischemic cardiomyopathy (1/20/2017)      Overview: A.  Echo (1/19/17):  EF 5-10% with severe GHK,. Mildly dil LA. Mild TR. PASP 46. B. Cath (1/20/17):  LM ost70. LAD m50. D1 80. LCx d70; OM1 99, OM2 90. RCA p100. No AVG. PAP  53/27/36. C.  PCI (2/9/17): pRCA 2.5x38 Xience + 2.75x12 Xience. ostLM 4.0x12       Xience post-dil with 4.5x12 NC. D.  Echo (2/13/17):  EF 10% with severe GHK, PSM. Dil LA. Mod MR. Mild       to mod TR. Left pleural effusion. E.  Echo (5/15/17): EF 25% with severe GHK and basl-mid inf AK, mildly dil       LV, DD. Sev dil LA. Mod MR. Mild TR. PASP 35. F.  ICD (6/12/17, Anup): [employer].       Paroxysmal atrial fibrillation ([address] (3/6/2017)      H/O: GI bleed (3/6/2017)      Dyslipidemia (3/6/2017) Overview: A. FLP (1/19/17): Tot 120, , HDL 19, LDL 76 (no Rx). Coronary artery disease involving native heart without angina pectoris (8/17/2017)      Peripheral vascular disease ([address] (9/18/2017)      Overview: A. RICKY (3/20/17): Right 0.58, Left 0.56. ICD (implantable cardioverter-defibrillator) in place (12/4/2017)      Tobacco dependence syndrome (10/9/2018)      Type 2 diabetes mellitus with diabetic neuropathy ([address] (12/6/2018)      Chronic systolic congestive heart failure, NYHA class 3 ([address] (1/8/2019)      CKD (chronic kidney disease) stage 3, GFR 30-59 ml/min ([address] (11/26/2019)      Neoplasm of lung (7/22/2020)      Liver metastases ([address] (7/22/2020)      Past Medical History:   Diagnosis Date    Arthritis     hands/fingers-OSTEO    CAD (coronary artery disease)     involving native coronary artery of native heart without angina pectoris    Cardiomyopathy ([address] 01/20/2017    A. Echo (1/19/17):  EF 5-10% with severe GHK,. Mildly dil LA. Mild TR. PASP 46./systemic cardiomyopathy    Chronic kidney disease     Chronic obstructive pulmonary disease (HCC)     Chronic renal insufficiency 03/06/2017    Chronic systolic congestive heart failure, NYHA class 3 ([address] 1/8/2019    Claudication ([address] 10/9/2018    Congestive heart failure ([address]     Diabetes mellitus ([address] 3/6/2017    IDDM    Dyslipidemia 3/6/2017    A. FLP (1/19/17): Tot 120, , HDL 19, LDL 76 (no Rx).  H/O: GI bleed 3/6/2017    Hepatitis C 3/6/2017    Hypokalemia     ICD (implantable cardioverter-defibrillator) in place     Ill-defined condition     flu 1/2018     Neuropathy 11/19/2018    Noncompliance with medication regimen 9/11/2018    Paroxysmal atrial fibrillation ([address] 3/6/2017    Peripheral vascular disease ([address] 9/18/2017    A. RICKY (3/20/17): Right 0.58, Left 0.56.     Seizures ([facility] [address]     WITH HEPARIN    Tobacco dependence syndrome 10/9/2018    Vitamin D deficiency 6/4/2018    Vitamin deficiency 2019      Past Surgical History:   Procedure Laterality Date    CARDIAC SURG PROCEDURE UNLIST      CORONARY STENTX 3 2 CORONARY, & 1 FEMORAL    COLONOSCOPY N/A 2017    COLONOSCOPY performed by Minor Trujillo. Naveed Baeza MD at [address] COLONOSCOPY N/A 2018    COLONOSCOPY performed by Minor Trujillo. Naveed Baeza MD at [facility] ENDOSCOPY    COLONOSCOPY N/A 10/22/2019    COLONOSCOPY performed by Yanick Warren MD at [facility] ENDOSCOPY    HX CORONARY STENT PLACEMENT      LM, RCA x 2     HX ENDOSCOPY  10/22/2019    [facility]     HX GI      colonoscopy x 3 - last 2017 - polyp    HX IMPLANTABLE CARDIOVERTER DEFIBRILLATOR      HX OTHER SURGICAL      HX PACEMAKER      AICD    VASCULAR SURGERY PROCEDURE UNLIST  2017    STENT - RIGHT FEMORAL       Family History   Problem Relation Age of Onset    Hypertension Mother     Heart Disease Mother         MURMUR    Cancer Father         COLON    Colon Cancer Father     Other Sister         born totally handicapped/epileptic    Kidney Disease Sister          from renal shutdown    Heart Disease Brother     Other Brother         ?pancreatic cancer or ?pancreatitis    Cancer Brother         PANCREATIC? AND COLON    Cancer Maternal Uncle         toes and spread    Cancer Paternal Uncle         stomach    Heart Attack Maternal Grandfather 47    Cancer Paternal Grandfather         bone    Cancer Maternal Uncle         stomach    Cancer Maternal Uncle         lung    No Known Problems Son     No Known Problems Son     Anesth Problems Neg Hx       History reviewed, no pertinent family history.   Social History     Tobacco Use    Smoking status: Current Every Day Smoker     Packs/day: 1.00     Years: 55.00     Pack years: 55.00    Smokeless tobacco: Never Used    Tobacco comment:     Substance Use Topics    Alcohol use: Yes     Comment: 1 BEER WEEKLY     Allergies   Allergen Reactions    Heparin (Porcine) Other (comments)     Was told when in the hospital that if he received heparin again that it would be deadly.       Current Facility-Administered Medications   Medication Dose Route Frequency    levoFLOXacin (LEVAQUIN) 250 mg in D5W IVPB  250 mg IntraVENous Q24H    torsemide (DEMADEX) tablet 10 mg  10 mg Oral PRN    ipratropium (ATROVENT) 0.02 % nebulizer solution 0.5 mg  0.5 mg Nebulization Q4H PRN    morphine IR (MS IR) tablet 15 mg  15 mg Oral Q6H    promethazine (PHENERGAN) tablet 25 mg  25 mg Oral Q6H PRN    sodium chloride (NS) flush 5-40 mL  5-40 mL IntraVENous Q8H    sodium chloride (NS) flush 5-40 mL  5-40 mL IntraVENous PRN    polyethylene glycol (MIRALAX) packet 17 g  17 g Oral DAILY PRN    nicotine (NICODERM CQ) 21 mg/24 hr patch 1 Patch  1 Patch TransDERmal DAILY    fondaparinux (ARIXTRA) injection 2.5 mg  2.5 mg SubCUTAneous Q24H    sacubitriL-valsartan (ENTRESTO) 49-51 mg tablet 1 Tab  1 Tab Oral BID    metoprolol succinate (TOPROL-XL) XL tablet 25 mg  25 mg Oral DAILY    potassium chloride SR (KLOR-CON 10) tablet 20 mEq  20 mEq Oral BID    atorvastatin (LIPITOR) tablet 40 mg  40 mg Oral DAILY    ergocalciferol capsule 50,000 Units  50,000 Units Oral Q7D    glucose chewable tablet 16 g  4 Tab Oral PRN    dextrose (D50W) injection syrg 12.5-25 g  25-50 mL IntraVENous PRN    glucagon (GLUCAGEN) injection 1 mg  1 mg IntraMUSCular PRN    insulin glargine (LANTUS) injection 32 Units  0.4 Units/kg SubCUTAneous QHS    insulin lispro (HUMALOG) injection   SubCUTAneous Q6H    morphine injection 2 mg  2 mg IntraVENous Q2H PRN          LAB AND IMAGING FINDINGS:     Lab Results   Component Value Date/Time    WBC 7.9 07/24/2020 05:51 AM    HGB 13.6 07/24/2020 05:51 AM    PLATELET 822 61/89/0465 05:51 AM     Lab Results   Component Value Date/Time    Sodium 122 (L) 07/24/2020 05:51 AM    Potassium 4.3 07/24/2020 05:51 AM    Chloride 89 (L) 07/24/2020 05:51 AM    CO2 25 07/24/2020 05:51 AM    BUN 27 (H) 07/24/2020 05:51 AM    Creatinine 2.45 (H) 07/24/2020 05:51 AM    Calcium 8.9 07/24/2020 05:51 AM    Magnesium 1.8 06/10/2020 12:00 AM    Phosphorus 2.5 (L) 01/23/2017 12:09 PM      Lab Results   Component Value Date/Time    Alk. phosphatase 141 (H) 07/22/2020 09:57 AM    Protein, total 7.8 07/22/2020 09:57 AM    Albumin 3.2 (L) 07/22/2020 09:57 AM    Globulin 4.6 (H) 07/22/2020 09:57 AM     Lab Results   Component Value Date/Time    INR 1.3 (H) 02/22/2017 03:16 AM    Prothrombin time 12.9 (H) 02/22/2017 03:16 AM    aPTT 28.2 02/15/2017 04:24 AM      Lab Results   Component Value Date/Time    Iron 69 04/29/2019 11:56 AM    TIBC 246 (L) 04/29/2019 11:56 AM    Iron % saturation 28 04/29/2019 11:56 AM      No results found for: PH, PCO2, PO2  No components found for: [address] [place] [address]   Lab Results   Component Value Date/Time    CK 51 01/18/2017 11:11 PM    CK - MB 2.9 01/18/2017 11:11 PM                Total time: 70 min  Counseling / coordination time, spent as noted above: 60 min  > 50% counseling / coordination?: yes    Prolonged service was provided for  []30 min   []75 min in face to face time in the presence of the patient, spent as noted above. Time Start:   Time End:   Note: this can only be billed with 50254 (initial) or 44582 (follow up). If multiple start / stop times, list each separately. Patients chart was reviewed. I spoke with Dr. Kalie Marie with family medicine regarding patients pain medication regimen, and was Venkat Nissen with any adjustments I make to regimen. I also spoke with Oncology, liver bx pathology still pending and MRI planned for tomorrow, recommend waiting to get more information to help guide goals of care discussion. It is very possible that the Fentanyl patch is providing some degree of analgesia s/p 8 hours since first started. However, likely not yet reached full dose effect/steady state. Patient is able to take medications orally without issue.      I discontinued Fentanyl Patch and order Morphine IR 15mg orally every 6 hours. He continues to have Morphine 2mg IV every 2 hours as needed. I was unable to find patients nurse, Gen THOMAS, but spoke with charge nurse regarding adjustments. Palliative team will follow up on effectiveness of pain regimen tomorrow. Please contact me via perfect serve if you have any questions or concerns.

## 2020-07-23 NOTE — PROGRESS NOTES
[facility] [place] at [address], [address] [facility] [address] [facility]  Carine Statoner: [phone]  F: [phone]      Reason for Visit:   Stacie Singh is a 81 y.o. male who is seen in consultation at the request of  Dea Pena NP for evaluation of ? Lung mass. Hematology / Oncology Treatment History:   · 7/22/2020 Presented with SOB and back pain to ED; CTA chest revealed RUL lung mass, extensive mediastinal, mesenteric and retroperitoneal lymphadenopathy and numerous hepatic hypodensities   · 7/22/2020 Liver bx: path pending  · 7/23/2020 CT A/P and brain MRI pending    History of Present Illness:     Dimitry Bird was admitted on 7/22/2020 from the ED when he presented after being referred by PCP with c/o chest and back pain present x 1 month associated with worsening SOB. ED CAT CHEST revealed RUL lung mass, extensive mediastinal, mesenteric and retroperitoneal lymphadenopathy and numerous hepatic hypodensities . ED labs Na 124 Creat 1.35 AST 52 BNP 10,129 Admitted for further eval and management. PMHx  CAD, COPD, CHF, DM, CKD hx of GIB    Mr Ancil Sever reports pain in rt scapula area that radiates to around to rt rib area and through to chest area. Rates pain 9/10. Pain present x 2-3 weeks; thought he had just pulled something in his back. Was using salonpas patches at home and Tylenol 500 mg every 4 hours without improvement. Had pain med in ED and pain was relieved. Had a virtual appt with PCP this am was directed to ED. Having SOB x weeks associated with cough. Does not use O2 at home. Having nausea at home due to coughing. Reports 19# intentional weight loss over the last 2 months.      Smoking Hx : 2 ppd x 50 yrs  ETOH: quit 25 yrs ago    Colonoscopy: current: approx 1 yr ago; goes every 3 yrs for polyps   EGD: has had unsure of last one    Family Hx of cancer:  Father: colon cancer  Brother colon cancer  Paternal uncle : stomach cancer  Maternal Uncle : lung cancer  Maternal Uncle :  bone cancer   Maternal uncle : stomach cancer    Retired: worked as a    x 23 yrs; wife Ian Woods at bedside. Has 2 boys from first marriage; 15 grandchildren and 6 great grandchildren      Interval History:     Still having pain under rt scapula that radiates under the rt rib area. Still having SOB ; thinks a lot of SOB is from abd area pushing up on his lungs. Wife at bedside. Past Medical History:   Diagnosis Date    Arthritis     hands/fingers-OSTEO    CAD (coronary artery disease)     involving native coronary artery of native heart without angina pectoris    Cardiomyopathy ([address] 01/20/2017    A. Echo (1/19/17):  EF 5-10% with severe GHK,. Mildly dil LA. Mild TR. PASP 46./systemic cardiomyopathy    Chronic kidney disease     Chronic obstructive pulmonary disease (HCC)     Chronic renal insufficiency 03/06/2017    Chronic systolic congestive heart failure, NYHA class 3 ([address] 1/8/2019    Claudication ([address] 10/9/2018    Congestive heart failure ([address]     Diabetes mellitus ([address] 3/6/2017    IDDM    Dyslipidemia 3/6/2017    A. FLP (1/19/17): Tot 120, , HDL 19, LDL 76 (no Rx).  H/O: GI bleed 3/6/2017    Hepatitis C 3/6/2017    Hypokalemia     ICD (implantable cardioverter-defibrillator) in place     Ill-defined condition     flu 1/2018     Neuropathy 11/19/2018    Noncompliance with medication regimen 9/11/2018    Paroxysmal atrial fibrillation ([address] 3/6/2017    Peripheral vascular disease ([address] 9/18/2017    A. RICKY (3/20/17): Right 0.58, Left 0.56.  Seizures ([address]     WITH HEPARIN    Tobacco dependence syndrome 10/9/2018    Vitamin D deficiency 6/4/2018    Vitamin deficiency 1/29/2019      Past Surgical History:   Procedure Laterality Date    CARDIAC SURG PROCEDURE UNLIST      CORONARY STENTX 3 2 CORONARY, & 1 FEMORAL    COLONOSCOPY N/A 2/17/2017    COLONOSCOPY performed by Yenny Yu.  Ravi Luna MD at [address] COLONOSCOPY N/A 2018    COLONOSCOPY performed by Raffi Mccain. Olivia Moore MD at [facility] ENDOSCOPY    COLONOSCOPY N/A 10/22/2019    COLONOSCOPY performed by Eddi Avila MD at [facility] ENDOSCOPY    HX CORONARY STENT PLACEMENT      LM, RCA x 2     HX ENDOSCOPY  10/22/2019    [facility]     HX GI      colonoscopy x 3 - last 2017 - polyp    HX IMPLANTABLE CARDIOVERTER DEFIBRILLATOR      HX OTHER SURGICAL      HX PACEMAKER      AICD    VASCULAR SURGERY PROCEDURE UNLIST  2017    STENT - RIGHT FEMORAL       Social History     Tobacco Use    Smoking status: Current Every Day Smoker     Packs/day: 1.00     Years: 55.00     Pack years: 55.00    Smokeless tobacco: Never Used    Tobacco comment:     Substance Use Topics    Alcohol use: Yes     Comment: 1 BEER WEEKLY      Family History   Problem Relation Age of Onset    Hypertension Mother     Heart Disease Mother         MURMUR    Cancer Father         COLON    Colon Cancer Father     Other Sister         born totally handicapped/epileptic    Kidney Disease Sister          from renal shutdown    Heart Disease Brother     Other Brother         ?pancreatic cancer or ?pancreatitis    Cancer Brother         PANCREATIC?  AND COLON    Cancer Maternal Uncle         toes and spread    Cancer Paternal Uncle         stomach    Heart Attack Maternal Grandfather 47    Cancer Paternal Grandfather         bone    Cancer Maternal Uncle         stomach    Cancer Maternal Uncle         lung    No Known Problems Son     No Known Problems Son     Anesth Problems Neg Hx      Current Facility-Administered Medications   Medication Dose Route Frequency    fentaNYL (DURAGESIC) 25 mcg/hr patch 1 Patch  1 Patch TransDERmal Q72H    [START ON 2020] levoFLOXacin (LEVAQUIN) 250 mg in D5W IVPB  250 mg IntraVENous Q24H    torsemide (DEMADEX) tablet 10 mg  10 mg Oral PRN    torsemide (DEMADEX) tablet 20 mg  20 mg Oral DAILY    sodium chloride (NS) flush 5-40 mL  5-40 mL IntraVENous Q8H    sodium chloride (NS) flush 5-40 mL  5-40 mL IntraVENous PRN    polyethylene glycol (MIRALAX) packet 17 g  17 g Oral DAILY PRN    nicotine (NICODERM CQ) 21 mg/24 hr patch 1 Patch  1 Patch TransDERmal DAILY    fondaparinux (ARIXTRA) injection 2.5 mg  2.5 mg SubCUTAneous Q24H    arformoteroL (BROVANA) neb solution 15 mcg  15 mcg Nebulization BID RT    budesonide (PULMICORT) 500 mcg/2 ml nebulizer suspension  500 mcg Nebulization BID RT    ipratropium (ATROVENT) 0.02 % nebulizer solution 0.5 mg  0.5 mg Nebulization Q4H RT    sacubitriL-valsartan (ENTRESTO) 49-51 mg tablet 1 Tab  1 Tab Oral BID    metoprolol succinate (TOPROL-XL) XL tablet 25 mg  25 mg Oral DAILY    potassium chloride SR (KLOR-CON 10) tablet 20 mEq  20 mEq Oral BID    atorvastatin (LIPITOR) tablet 40 mg  40 mg Oral DAILY    ergocalciferol capsule 50,000 Units  50,000 Units Oral Q7D    glucose chewable tablet 16 g  4 Tab Oral PRN    dextrose (D50W) injection syrg 12.5-25 g  25-50 mL IntraVENous PRN    glucagon (GLUCAGEN) injection 1 mg  1 mg IntraMUSCular PRN    insulin glargine (LANTUS) injection 32 Units  0.4 Units/kg SubCUTAneous QHS    insulin lispro (HUMALOG) injection   SubCUTAneous Q6H    morphine injection 2 mg  2 mg IntraVENous Q2H PRN      Allergies   Allergen Reactions    Heparin (Porcine) Other (comments)     Was told when in the hospital that if he received heparin again that it would be deadly. Review of Systems: A complete review of systems was obtained, negative except as described above.       Physical Exam:     Visit Vitals  /74   Pulse (!) 101   Temp 97.9 °F (36.6 °C)   Resp 19   Ht 5' 10\" (1.778 m)   Wt 81.2 kg (179 lb)   SpO2 90%   BMI 25.68 kg/m²     ECOG PS: 1-2  General: appears older than stated age; no acute distress  Eyes: PERRLA, anicteric sclerae  HENT: Atraumatic with normal appearance of ears and nose; OP clear  Neck: Supple; no thyromegaly   Lymphatic: No cervical, supraclavicular, or axillary adenopathy  Respiratory: diminished breath sounds bilaterally R> L; no wheezing or rhonchi noted; normal respiratory effort: O2 in use  CV: Normal rate, regular rhythm, no murmurs, no peripheral edema  GI: Soft, nontender, nondistended, no masses, no hepatomegaly, no splenomegaly  MS:  Digits without clubbing or cyanosis. Skin: No rashes, ecchymoses, or petechiae. Normal temperature, turgor, and texture. Neuro/Psych: Alert, oriented, appropriate affect, normal judgment/insight      Results:     Lab Results   Component Value Date/Time    WBC 7.7 07/23/2020 05:12 AM    HGB 13.7 07/23/2020 05:12 AM    HCT 40.6 07/23/2020 05:12 AM    PLATELET 360 91/14/7770 05:12 AM    MCV 80.9 07/23/2020 05:12 AM    ABS. NEUTROPHILS 6.1 07/23/2020 05:12 AM     Lab Results   Component Value Date/Time    Sodium 125 (L) 07/23/2020 05:12 AM    Potassium 3.9 07/23/2020 05:12 AM    Chloride 92 (L) 07/23/2020 05:12 AM    CO2 25 07/23/2020 05:12 AM    Glucose 105 (H) 07/23/2020 05:12 AM    BUN 24 (H) 07/23/2020 05:12 AM    Creatinine 1.59 (H) 07/23/2020 05:12 AM    GFR est AA 53 (L) 07/23/2020 05:12 AM    GFR est non-AA 44 (L) 07/23/2020 05:12 AM    Calcium 8.6 07/23/2020 05:12 AM    Glucose (POC) 134 (H) 07/23/2020 11:19 AM     Lab Results   Component Value Date/Time    Bilirubin, total 0.5 07/22/2020 09:57 AM    ALT (SGPT) 31 07/22/2020 09:57 AM    Alk. phosphatase 141 (H) 07/22/2020 09:57 AM    Protein, total 7.8 07/22/2020 09:57 AM    Albumin 3.2 (L) 07/22/2020 09:57 AM    Globulin 4.6 (H) 07/22/2020 09:57 AM     Lab Results   Component Value Date/Time    Iron % saturation 28 04/29/2019 11:56 AM    TIBC 246 (L) 04/29/2019 11:56 AM    Vitamin B12 291 11/19/2018 12:00 AM    Folate 8.9 11/19/2018 12:00 AM     (H) 02/14/2017 03:03 AM    TSH 1.250 06/23/2020 12:00 AM    Lipase 201 07/22/2020 09:57 AM    Hep C  virus Ab Interp.  REACTIVE (A) 02/14/2017 04:29 PM    Hepatitis C virus Ab >11.90 02/14/2017 04:29 PM     Lab Results   Component Value Date/Time    INR 1.3 (H) 02/22/2017 03:16 AM    aPTT 28.2 02/15/2017 04:24 AM     Lab Results   Component Value Date/Time     (H) 02/14/2017 03:03 AM     7/22/2020 XR CHEST   IMPRESSION: New asymmetric right-sided airspace disease with right pleural  effusion    7/22/2020 CTA CHEST   IMPRESSION:   Imaging findings consistent with lung neoplasm with widespread metastatic  disease. Hilar and mediastinal lymphadenopathy. Hepatic metastatic metastases. Retroperitoneal and mesenteric lymphadenopathy as well.     There is significant attenuation of pulmonary arterial vessels extending to the  right upper lobe without pulmonary embolism identified. Small possibly malignant  right-sided pleural effusion.     Hepatic mass lesions are amenable to percutaneous sampling.    7/23/2020 NM Bone scan  IMPRESSION: No evidence of osseous metastatic disease. Right hilar mass is  compatible with malignancy. 7/23/2020 CT A/P  IMPRESSION:  1. Too numerous to count hepatic metastases.     2.  Retroperitoneal adenopathy in the upper abdomen.     3. Distended gallbladder. Dilated common bile duct. The etiology is unclear but  may be secondary to parapancreatic adenopathy. Recommend correlation with serum  alkaline phosphatase and signs and symptoms of cholecystitis.     4. Moderate right pleural effusion. 7/24/2020 MRI Brain W WO cont  Pending        Assessment and Recommendations:     1. RUL lung mass, extensive mediastinal, mesenteric and retroperitoneal  lymphadenopathy and numerous hepatic hyodensities  Concerning for widespread metastatic disease likely from lung primary  7/22/ liver bx : path pending  -CT A/P as above  -MRI brain;  ICD is compatible with MRI; appreciate  cardiology colleagues assistance; scheduled for tomorrow 7/24  -NM Bone scan : No evidence of osseous metastatic disease    2.  Dyspnea  Likely 2/2 to new lung mass and large rt pleural effusion  Consulted pulmonary to assess for possible thoracentesis and to establish care as outpatient  Continue with supportive care      3. HFrEF/CAD   ECHO 6/15/20 LVEF 20-15%  Cardiology following    4. CHACORTA on CKD/ Hyponatremia  Concern for SIADH. Nephrology following    5. T2DM  Management per primary team      6. Pain  Continue prn medication. Palliative consulted. Would benefit from outpatient palliative follow up, given likely metastatic lung cancer. 7. Distended gallbladder  Noted on CT A/P  Not currently symptomatic although Alk phos is elevated:   Continue to monitor for symptoms and consider GI consult or General surgery consult if symptomatic    8. Goals of care  Palliative team consulted      Plan reviewed with Dr Ct Brand.        Signed By: Preet Ross NP

## 2020-07-23 NOTE — PROGRESS NOTES
0700 Shift report received from Iliana Woodard [place]. SBAR, Kardex, I & O, labs, telemetry. Stated pt started CT contrast at 0700 and CT was scheduled for 0900.     0930 Called CT. CT stated not notified that pt had started CT contrast. Coming to get pt right now for scan. 1020 Pt off unit for CT.     1045 Pt wife arrived. Asking about hospice and pain management once pt is discharged. Will have MD follow up. Sent her to room to await pt return from CT.     1115 Pt returned from CT. Started IV abx.     1145 Pain medication administered. Asked pt if needing anything else. Asked for \"prayers\". Offered to have  come speak with him and his wife. Pt declined at this time. Told him that he can request that at any time during his stay. [address] [facility] to inquire about time and checklist. Austin Baig they just received cards form and have to check with  of ICD for compatibility. Said likely not going to be done today. Will have pt complete MRI checklist and send to MRI. 1330 MRI called to say on the schedule for tomorrow at 1415     1400 Administered pain medication.  present to speak with pt (had been trying to see pt since yesterday in ED but unable due to various testings). Pulm MD entered while in room. Left UA in bathroom for pt to complete when able. 1530 Entered room to obtain labs. Pulm performing thoracentesis. Urine obtained. Pleural fluid obtained. Labs obtained when pulm done. Walked down to lab with appropriate labels. 1600 Administered pain medication. 1825 Tubed MRI checklist down to radiology. Copy was placed on pt chart at nursing station. Found nicotine patch in pt bed. Contact pharm to issue new one. 2030 Pt wife leaving. Pt has nausea per wife. Called MD. Orders received. Upon assessment pt no longer experiencing nausea. New nicotine patch applied. Pt resting comfortably.      2300 Bedside shift change report given to Carlos Wall RN (oncoming nurse) by Jt Heath (offgoing nurse). Report included the following information SBAR, Kardex, Procedure Summary, Intake/Output and MAR.

## 2020-07-23 NOTE — PROGRESS NOTES
Cardiology Progress Note       4th floor   NAME:  Bianca Moraes. :   1950   MRN:   [id number]     Assessment/Plan:   1. Dyspnea with new R sided lung mass and pleural effusion: Will need CT A/P, brain MRI, whole body bine scan. Phoodeez confirmed device is MRI compatible. 2.  A/C systolic CHF d/t ICM, Stage C, NYHA Class II, LVEF 20-25% , s/p ICD for SCD prevention. Resumed toprol and entresto. Resume demedex. 3.  Hyponatremia: - Low Na+ likely from SIADH -- His Na+ is low off of all diuretics past 2 weeks   4. CAD s/p Impella assisted hrPCI - MIKE to 100%pRCA and 70% LM - 17 . Cont asa/statin. 5.  HTN  6. PAF:   Watchman aborted due to thrombus along coumadin ridge, not taking eliquis 5mg d/t to cost, only on ASA 81mg- f/u with Dr. Ebenezer Tenorio. Stopped amiodarone due to Hyperthyroidism, check thyroid profile every 3 months and PFT/DLCO every year. Not on anticoagulation d/t cost.   7.  CKD stage III: nephrology to see. CARDIOLOGY ATTENDING  Patient personally seen and examined. All the elements of history and examination were personally performed. Assessment and plan was discussed and agree as written above    hrt RRR. Lungs diminished at base. No edema. Seems stable cardiac wise and has tolerated resumption of cardiac meds. Would like to resume torsemide when OK by Nephrology. Mercedez Alford MD, [facility]            Subjective:   Bhavik العراقي Jr. is a 81 y. o. male with PMHx of CAD, COPD, CHF (EF-20%), T2DM, CKD3, and hx of GIB who presents to the ED complaining of increasing SOB and shoulder / chest pain.       The pt states that about a month ago he began to [facility] and R shoulder pain. He also has had worsening dyspnea on exertion with wheezing, night sweats, cough with gray sputum, diarrhea, decreased PO intake and abdominal 'soreness'.  Symptoms progressed and he felt poorly and stopped all his medications 2 weeks ago.        Workup in ER  showed a new large lung mass with metastatic disease.      CXR 7/22/20 - New asymmetric right-sided airspace disease with right pleural effusion     Chest CTA 7/22/20 - There is a large right upper lobe mass lesion. Extensive mediastinal lymphadenopathy. Numerous hepatic hypodensities consistent with metastatic disease. Mesenteric and retroperitoneal lymphadenopathy as well. There is a moderate to large right-sided pleural effusion.      RUL lung mass, extensive mediastinal, mesenteric and retroperitoneal  lymphadenopathy and numerous hepatic hyodensities  Concerning for widespread metastatic disease likely from lung primary  7/22/ liver bx : path pending        Cardiac ROS: Patient denies any exertional chest pain, palpitations, syncope, orthopnea, edema or paroxysmal nocturnal dyspnea. Previous Cardiac Eval  ECHO (2/13/17): EF 10% severe GHK, PSM. Dil LA. Mod MR. Mild to mod TR. Left pleural effusion  ECHO (1/19/17): EF 5-10% with severe GHK,. Mildly dil LA. Mild TR. PASP 46. ECHO: (5/15/17) EF 25%, severe GHK, basal-mid inferior AK, severe LAE, Mod MR, Mild TR,  ECHO 7/11/2018: LVD, EF 25%, severe GHK, AK  basal-mid inferior  Wall. G1DD, mild- mod LAE, mild MR/TR, RVSP 42 mmHg. AO root dilation 4.3       CATH (1/20/17): LM ost70. LAD m50. D1 80. LCx d70; OM1 99, OM2 90. RCA p100. No AVG. PAP 53/27/36. --- s/p PCI (2/9/17): pRCA 2.5x38 Xience + 2.75x12 Xience. ostLM 4.0x12 Xience post-dil with 4.5x12 NC. ICD (6/12/17, Anup): [employer] single lead    CARDIAC MRI 1/31/17:LVEF 10%, LVH, RV dilated RVEF 25% GHK, marked LAE, mod CB, mild MR, mod-severe TR, The entire LAD territory : largely viable and should recover upon  revascularization. The entire RCA territory is largely viable and should recover  upon revascularization. The LCx territory demonstrate medium-size infarct with  limited viability. Large left-sided pleural effusion with passive atelectasis of the left lung.     RICKY (3/20/17): R: 0.58, L: 0.56    LE arteriogm 2018: bilateral occluded SFA      Review of Systems: No nausea, indigestion, vomiting, pain, cough, sputum. No bleeding. Taking po. Objective:     Visit Vitals  /77   Pulse 90   Temp 97.9 °F (36.6 °C)   Resp 20   Ht 5' 10\" (1.778 m)   Wt 179 lb (81.2 kg)   SpO2 96%   BMI 25.68 kg/m²    O2 Flow Rate (L/min): 3 l/min O2 Device: Nasal cannula    Temp (24hrs), Av.2 °F (36.8 °C), Min:97.9 °F (36.6 °C), Max:98.7 °F (37.1 °C)       07 -  1900  In: -   Out: 200 [Urine:200]    No intake/output data recorded. TELE: SR/ST     General: AAOx3 cooperative, no acute distress. HEENT: Atraumatic. Pink and moist.  Anicteric sclerae. Neck : Supple. Lungs: R side diminished. Heart: Regular rhythm, no murmur, no rubs, no gallops. No JVD. No carotid bruits. Abdomen: Soft, non-distended, non-tender. + Bowel sounds. Extremities: No edema, no clubbing, no cyanosis. Neurologic: Grossly intact. Alert and oriented X 3. No acute neurological distress. Psych: Good insight. Not anxious or agitated. Care Plan discussed with:    Comments   Patient x    Family      RN x    Care Manager                    Consultant:  x        Data Review:     No lab exists for component: ITNL   Recent Labs     20  1059   555 [address] <0.05     Recent Labs     20  0512 20  1805 20  0957   * 125* 124*   K 3.9 4.0 3.9   CL 92* 92* 90*   CO2 25 24 23   BUN 24* 20 20   CREA 1.59* 1.45* 1.35*   * 135* 158*   ALB  --   --  3.2*   WBC 7.7  --  7.6   HGB 13.7  --  16.0   HCT 40.6  --  46.6     --  210     No results for input(s): INR, PTP, APTT, INREXT in the last 72 hours.     Medications reviewed  Current Facility-Administered Medications   Medication Dose Route Frequency    sodium chloride (NS) flush 5-40 mL  5-40 mL IntraVENous Q8H    sodium chloride (NS) flush 5-40 mL  5-40 mL IntraVENous PRN    polyethylene glycol (MIRALAX) packet 17 g  17 g Oral DAILY PRN    nicotine (NICODERM CQ) 21 mg/24 hr patch 1 Patch  1 Patch TransDERmal DAILY    fondaparinux (ARIXTRA) injection 2.5 mg  2.5 mg SubCUTAneous Q24H    arformoteroL (BROVANA) neb solution 15 mcg  15 mcg Nebulization BID RT    budesonide (PULMICORT) 500 mcg/2 ml nebulizer suspension  500 mcg Nebulization BID RT    ipratropium (ATROVENT) 0.02 % nebulizer solution 0.5 mg  0.5 mg Nebulization Q4H RT    sacubitriL-valsartan (ENTRESTO) 49-51 mg tablet 1 Tab  1 Tab Oral BID    metoprolol succinate (TOPROL-XL) XL tablet 25 mg  25 mg Oral DAILY    potassium chloride SR (KLOR-CON 10) tablet 20 mEq  20 mEq Oral BID    atorvastatin (LIPITOR) tablet 40 mg  40 mg Oral DAILY    ergocalciferol capsule 50,000 Units  50,000 Units Oral Q7D    glucose chewable tablet 16 g  4 Tab Oral PRN    dextrose (D50W) injection syrg 12.5-25 g  25-50 mL IntraVENous PRN    glucagon (GLUCAGEN) injection 1 mg  1 mg IntraMUSCular PRN    insulin glargine (LANTUS) injection 32 Units  0.4 Units/kg SubCUTAneous QHS    insulin lispro (HUMALOG) injection   SubCUTAneous Q6H    morphine injection 2 mg  2 mg IntraVENous Q2H PRN         Teagan Pederson NP

## 2020-07-23 NOTE — CONSULTS
[address]     Name:  Cristino Iglesias  MR#:  [id number]  :  1950  ACCOUNT #:  [de-identified]  DATE OF SERVICE:  2020    NEPHROLOGY CONSULTATION    REQUESTING PHYSICIAN:  Dr. Jd Bentley. CHIEF COMPLAINT:  Elevated creatinine. HISTORY OF PRESENT ILLNESS:  The patient is a 61-year-old white male with multiple medical problems including chronic cardiorenal syndrome. He has seen us in the past but has not been seen recently in the office. His baseline creatinine in 2020 was 1.4, and creatinine was 1.4 and stable on admission this time but has trended up during this hospitalization to 1.59, prompting consultation. He was admitted to the hospital last night with chest pain and shortness of breath. He has been having fullness of his abdomen and some back pain. CT scan of the chest showed new probable lung neoplasm with metastatic disease. We have been asked to assist in management of his diuretics and kidney dysfunction. REVIEW OF SYSTEMS:  CONSTITUTIONAL:  Positive for weakness. No fever. GI:  Positive for nausea. Positive for abdominal fullness. :  He has noted a decrease in urine output and slow urine flow. CARDIOVASCULAR:  Positive for chest pain. Positive for shortness of breath. No lower extremity edema. All other systems reviewed and are negative except as per history of present illness. PAST MEDICAL HISTORY:  Chronic kidney disease, stage III; coronary artery disease; ischemic cardiomyopathy; type 2 diabetes mellitus; hypertension; peripheral vascular disease; paroxysmal atrial fibrillation; hepatitis C; hyperlipidemia; congestive heart failure; tobacco abuse. FAMILY HISTORY:  Sister had kidney failure. SOCIAL HISTORY:  He has prior history of polysubstance abuse and 100-pack-year history of smoking. PHYSICAL EXAMINATION:  VITAL SIGNS:  Temperature 97.9, pulse 101, blood pressure 118/74.   GENERAL:  Chronically-ill white male who appears anxious. HEENT:  Eyes are anicteric. Extraocular movements intact. ENMT, mucous membranes dry. There is no epistaxis. NECK:  Nontender. There is no mass. HEART:  Regular. There is no lower extremity edema. LUNGS:  Revealed diminished breath sounds at the bases with fair effort. GI:  Abdomen is protuberant. Minimal tenderness. Bowel sounds are active. SKIN:  Warm with normal turgor. There is no rash. MUSCULOSKELETAL:  There is no cyanosis or clubbing. There is no synovitis in fingers or wrists bilaterally. LABORATORY:  Sodium 125, potassium 3.9, chloride 92, bicarb 25, BUN 24, creatinine 1.59. White count 7.7, hemoglobin 13.7, platelets 904. RADIOGRAPHIC STUDIES:  CT scan of the chest is as per history of present illness. ASSESSMENT:  Worsening renal function, consistent with acute kidney injury superimposed on chronic kidney disease stage III. Underlying chronic kidney disease is felt to be due to atherosclerosis and cardiorenal syndrome and the worsening renal function may be due to exacerbation of cardiorenal syndrome and contrast nephropathy. He appears to be in a volume excess state. PLAN:  1. We would continue loop diuretic for now and monitor renal function. 2.  Urinalysis on this admission shows more protein than previous. We will check quantitative urinary protein. 3.  Try to avoid nephrotoxins as much as possible. 4.  Monitor serial labs. 5.  We will follow with you to assist in his management during this hospitalization.       Jeremy Cain MD      [id number]  D:  07/23/2020 11:45  T:  07/23/2020 13:59  JOB #:  [id number]

## 2020-07-23 NOTE — CONSULTS
Name: Bryant Loomis Hospital: [facility] [address]   : 1950 Admit Date: 2020   Phone: [phone]  Room: [facility]/   PCP: Kary Berry MD  MRN: [id number]   Date: 2020  Code: Partial Code          Chart and notes reviewed. Data reviewed. I review the patient's current medications in the medical record at each encounter. I have evaluated and examined the patient. HPI:    4:11 PM       History was obtained from patient. I was asked by Davy Phan to see Bryant Loomis in consultation for a chief complaint of pleural effusion. History of Present Illness:  Mr. Cleveland Cherry is a 80 yo with a history of COPD, CAD, COPD, sCHF, CKD, DM and tobacco abuse who presented with chest and back pain, found to have a CT chest with a RUL mass, LAD (mediastinal, mesenteric, and retroperitoneal) and numerous hepatic lesions. Pain is in the right scapula area and radiates through the right side of his chest. He has COPD and on Anoro at home, but has had progressive shortness of breath. He is getting nebs here and he says they are not helpful, would like to make PRN. Does not use O2 at home. Has an intermittent cough that is dry. Lost about 20lbs over the past 2 months. Has gotten a liver biopsy of the live lesions since admission. Labs: WBC 7.7, Hgb 13.7, , INR 1.3, cr 1.59,     Images:  CTA chest hilar and mediastinal LAD, hepatic mets, retroperitoneal and mesenteric LAD, R sided effusion        Past Medical History:   Diagnosis Date    Arthritis     hands/fingers-OSTEO    CAD (coronary artery disease)     involving native coronary artery of native heart without angina pectoris    Cardiomyopathy ([facility] [address] 2017    A. Echo (17):  EF 5-10% with severe GHK,. Mildly dil LA. Mild TR.   PASP 46./systemic cardiomyopathy    Chronic kidney disease     Chronic obstructive pulmonary disease (HCC)     Chronic renal insufficiency 2017    Chronic systolic congestive heart failure, NYHA class 3 ([address] 2019    Claudication ([address] 10/9/2018    Congestive heart failure ([address]     Diabetes mellitus ([address] 3/6/2017    IDDM    Dyslipidemia 3/6/2017    A. FLP (17): Tot 120, , HDL 19, LDL 76 (no Rx).  H/O: GI bleed 3/6/2017    Hepatitis C 3/6/2017    Hypokalemia     ICD (implantable cardioverter-defibrillator) in place     Ill-defined condition     flu 2018     Neuropathy 2018    Noncompliance with medication regimen 2018    Paroxysmal atrial fibrillation ([address] 3/6/2017    Peripheral vascular disease ([address] 2017    A. RICKY (3/20/17): Right 0.58, Left 0.56.  Seizures ([address]     WITH HEPARIN    Tobacco dependence syndrome 10/9/2018    Vitamin D deficiency 2018    Vitamin deficiency 2019       Past Surgical History:   Procedure Laterality Date    CARDIAC SURG PROCEDURE UNLIST      CORONARY STENTX 3 2 CORONARY, & 1 FEMORAL    COLONOSCOPY N/A 2017    COLONOSCOPY performed by Ryan Sosa. Kandice Garcia MD at [address] COLONOSCOPY N/A 2018    COLONOSCOPY performed by Ryan Sosa.  Kandice Garcia MD at [facility] ENDOSCOPY    COLONOSCOPY N/A 10/22/2019    COLONOSCOPY performed by Navjot Coombs MD at [facility] ENDOSCOPY    HX CORONARY STENT PLACEMENT      LM, RCA x 2     HX ENDOSCOPY  10/22/2019    [facility]     HX GI      colonoscopy x 3 - last 2017 - polyp    HX IMPLANTABLE CARDIOVERTER DEFIBRILLATOR      HX OTHER SURGICAL      HX PACEMAKER      AICD    VASCULAR SURGERY PROCEDURE UNLIST  2017    STENT - RIGHT FEMORAL        Family History   Problem Relation Age of Onset    Hypertension Mother     Heart Disease Mother         MURMUR    Cancer Father         COLON    Colon Cancer Father     Other Sister         born totally handicapped/epileptic    Kidney Disease Sister          from renal shutdown    Heart Disease Brother     Other Brother         ?pancreatic cancer or ?pancreatitis    Cancer Brother PANCREATIC? AND COLON    Cancer Maternal Uncle         toes and spread    Cancer Paternal Uncle         stomach    Heart Attack Maternal Grandfather 47    Cancer Paternal Grandfather         bone    Cancer Maternal Uncle         stomach    Cancer Maternal Uncle         lung    No Known Problems Son     No Known Problems Son     Anesth Problems Neg Hx        Social History     Tobacco Use    Smoking status: Current Every Day Smoker     Packs/day: 1.00     Years: 55.00     Pack years: 55.00    Smokeless tobacco: Never Used    Tobacco comment:     Substance Use Topics    Alcohol use: Yes     Comment: 1 BEER WEEKLY       Allergies   Allergen Reactions    Heparin (Porcine) Other (comments)     Was told when in the hospital that if he received heparin again that it would be deadly.        Current Facility-Administered Medications   Medication Dose Route Frequency    fentaNYL (DURAGESIC) 25 mcg/hr patch 1 Patch  1 Patch TransDERmal Q72H    [START ON 7/24/2020] levoFLOXacin (LEVAQUIN) 250 mg in D5W IVPB  250 mg IntraVENous Q24H    torsemide (DEMADEX) tablet 10 mg  10 mg Oral PRN    torsemide (DEMADEX) tablet 20 mg  20 mg Oral DAILY    ipratropium (ATROVENT) 0.02 % nebulizer solution 0.5 mg  0.5 mg Nebulization Q4H PRN    sodium chloride (NS) flush 5-40 mL  5-40 mL IntraVENous Q8H    sodium chloride (NS) flush 5-40 mL  5-40 mL IntraVENous PRN    polyethylene glycol (MIRALAX) packet 17 g  17 g Oral DAILY PRN    nicotine (NICODERM CQ) 21 mg/24 hr patch 1 Patch  1 Patch TransDERmal DAILY    fondaparinux (ARIXTRA) injection 2.5 mg  2.5 mg SubCUTAneous Q24H    sacubitriL-valsartan (ENTRESTO) 49-51 mg tablet 1 Tab  1 Tab Oral BID    metoprolol succinate (TOPROL-XL) XL tablet 25 mg  25 mg Oral DAILY    potassium chloride SR (KLOR-CON 10) tablet 20 mEq  20 mEq Oral BID    atorvastatin (LIPITOR) tablet 40 mg  40 mg Oral DAILY    ergocalciferol capsule 50,000 Units  50,000 Units Oral Q7D    glucose chewable tablet 16 g  4 Tab Oral PRN    dextrose (D50W) injection syrg 12.5-25 g  25-50 mL IntraVENous PRN    glucagon (GLUCAGEN) injection 1 mg  1 mg IntraMUSCular PRN    insulin glargine (LANTUS) injection 32 Units  0.4 Units/kg SubCUTAneous QHS    insulin lispro (HUMALOG) injection   SubCUTAneous Q6H    morphine injection 2 mg  2 mg IntraVENous Q2H PRN         REVIEW OF SYSTEMS   12 point ROS negative except as stated in the HPI. Physical Exam:   Visit Vitals  /64   Pulse (!) 102   Temp 98 °F (36.7 °C)   Resp 23   Ht 5' 10\" (1.778 m)   Wt 81.2 kg (179 lb)   SpO2 90%   BMI 25.68 kg/m²       General:  Alert, cooperative, no distress, appears stated age. Head:  Normocephalic, without obvious abnormality, atraumatic. Eyes:  Conjunctivae/corneas clear. Nose: Nares normal. Septum midline. Mucosa normal.    Throat: Lips, mucosa, and tongue normal.    Neck: Supple, symmetrical, trachea midline, no adenopathy. Lungs:   Clear to auscultation bilaterally, diminished in the right base   Chest wall:  No tenderness or deformity. Heart:  Regular rate and rhythm, S1, S2 normal, no murmur, click, rub or gallop. Abdomen:   Soft, non-tender. Bowel sounds normal.   Extremities: Extremities normal, atraumatic, no cyanosis or edema. Pulses: 2+ and symmetric all extremities.    Skin: Skin color, texture, turgor normal. No rashes or lesions       Neurologic: Grossly nonfocal       Lab Results   Component Value Date/Time    Sodium 125 (L) 07/23/2020 05:12 AM    Potassium 3.9 07/23/2020 05:12 AM    Chloride 92 (L) 07/23/2020 05:12 AM    CO2 25 07/23/2020 05:12 AM    BUN 24 (H) 07/23/2020 05:12 AM    Creatinine 1.59 (H) 07/23/2020 05:12 AM    Glucose 105 (H) 07/23/2020 05:12 AM    Calcium 8.6 07/23/2020 05:12 AM    Magnesium 1.8 06/10/2020 12:00 AM    Phosphorus 2.5 (L) 01/23/2017 12:09 PM    Lactic acid 1.8 07/22/2020 10:59 AM       Lab Results   Component Value Date/Time    WBC 7.7 07/23/2020 05:12 AM    HGB 13.7 07/23/2020 05:12 AM    PLATELET 409 49/99/5569 05:12 AM    MCV 80.9 07/23/2020 05:12 AM       Lab Results   Component Value Date/Time    INR 1.3 (H) 02/22/2017 03:16 AM    aPTT 28.2 02/15/2017 04:24 AM    Alk. phosphatase 141 (H) 07/22/2020 09:57 AM    Protein, total 7.8 07/22/2020 09:57 AM    Albumin 3.2 (L) 07/22/2020 09:57 AM    Globulin 4.6 (H) 07/22/2020 09:57 AM       Lab Results   Component Value Date/Time    Iron 69 04/29/2019 11:56 AM    TIBC 246 (L) 04/29/2019 11:56 AM    Iron % saturation 28 04/29/2019 11:56 AM       Lab Results   Component Value Date/Time    TSH 1.250 06/23/2020 12:00 AM        No results found for: PH, PHI, PCO2, PCO2I, PO2, PO2I, HCO3, HCO3I, FIO2, FIO2I    Lab Results   Component Value Date/Time    CK 51 01/18/2017 11:11 PM    CK-MB Index 5.7 (H) 01/18/2017 11:11 PM    Troponin-I, Qt. <0.05 07/22/2020 10:59 AM    BNP 1,209 (H) 01/21/2017 12:25 PM        Lab Results   Component Value Date/Time    Culture result: NO GROWTH AFTER 19 HOURS 07/22/2020 10:31 AM    Culture result: MRSA NOT PRESENT 02/14/2020 11:45 AM    Culture result:  02/14/2020 11:45 AM         Screening of patient nares for MRSA is for surveillance purposes and, if positive, to facilitate isolation considerations in high risk settings. It is not intended for automatic decolonization interventions per se as regimens are not sufficiently effective to warrant routine use.        Lab Results   Component Value Date/Time    Hepatitis B surface Ag 0.25 02/14/2017 04:29 PM       Lab Results   Component Value Date/Time    Vancomycin,trough 20.2 (HH) 01/24/2017 03:56 AM    CK 51 01/18/2017 11:11 PM       Lab Results   Component Value Date/Time    Color YELLOW/STRAW 07/23/2020 07:10 AM    Appearance CLEAR 07/23/2020 07:10 AM    Specific gravity 1.025 07/23/2020 07:10 AM    pH (UA) 5.5 07/23/2020 07:10 AM    Protein >300 (A) 07/23/2020 07:10 AM    Glucose Negative 07/23/2020 07:10 AM    Ketone Negative 07/23/2020 07:10 AM Bilirubin NEGATIVE  02/14/2020 12:04 PM    Blood TRACE (A) 07/23/2020 07:10 AM    Urobilinogen 0.2 07/23/2020 07:10 AM    Nitrites Negative 07/23/2020 07:10 AM    Leukocyte Esterase Negative 07/23/2020 07:10 AM    WBC 0-4 07/23/2020 07:10 AM    RBC 0-5 07/23/2020 07:10 AM    Bacteria Negative 07/23/2020 07:10 AM       IMPRESSION  · Pleural effusion  · Pulmonary nodule  · Mets to the liver  · COPD, not in exacerbation  · Acute respiratory failure with hypoxia  · Tobacco use    PLAN  · Goal sats 88% or higher, wean O2 as able  · Will need exercise oximetry closer to discharge  · Thoracentesis completed today  · Follow-up CXR  · Will need follow-up CXR as an outpatient to assess for reaccumulation  · Make nebs prn as he says they are not helpful, but if he notices a change in his breathing will reschedule  · Follow-up liver biopsy  · Would benefit from outpatient pulmonary follow-up      Thank you for allowing us to participate in the care of this patient. We will be happy to follow along in his/her progress with you.     Wayne Palacios MD

## 2020-07-23 NOTE — CONSULTS
Consult dict    CHACORTA on CKD 3 possibly cardiorenal syndrome or contrast nephropathy  Chronic cardiorenal syndrome  New dx lung Ca with mets    Rec:  Supportive care  Continue diuretics.  Seems vol +  Serial labs  Check urine p/c ratio  Avoid nephrotoxins if possible

## 2020-07-24 NOTE — PROGRESS NOTES
[facility] [place] at [facility]  [address]  Len[place] Drop: [phone]  F: [phone]      Reason for Visit:   Arvind Abdalla. is a 81 y.o. male who is seen in consultation at the request of  Rodrigo Sanchez NP for evaluation of ? Lung mass. Hematology / Oncology Treatment History:   · 7/22/2020 Presented with SOB and back pain to ED; CTA chest revealed RUL lung mass, extensive mediastinal, mesenteric and retroperitoneal lymphadenopathy and numerous hepatic hypodensities   · 7/22/2020 Liver bx: Undifferentiated small cell carcinoma  · 7/23/2020 CT A/P and brain MRI pending    History of Present Illness:     Sherin Zee was admitted on 7/22/2020 from the ED when he presented after being referred by PCP with c/o chest and back pain present x 1 month associated with worsening SOB. ED CAT CHEST revealed RUL lung mass, extensive mediastinal, mesenteric and retroperitoneal lymphadenopathy and numerous hepatic hypodensities . ED labs Na 124 Creat 1.35 AST 52 BNP 10,129 Admitted for further eval and management. PMHx  CAD, COPD, CHF, DM, CKD hx of GIB    Mr Jess Gómez reports pain in rt scapula area that radiates to around to rt rib area and through to chest area. Rates pain 9/10. Pain present x 2-3 weeks; thought he had just pulled something in his back. Was using salonpas patches at home and Tylenol 500 mg every 4 hours without improvement. Had pain med in ED and pain was relieved. Had a virtual appt with PCP this am was directed to ED. Having SOB x weeks associated with cough. Does not use O2 at home. Having nausea at home due to coughing. Reports 19# intentional weight loss over the last 2 months.      Smoking Hx : 2 ppd x 50 yrs  ETOH: quit 25 yrs ago    Colonoscopy: current: approx 1 yr ago; goes every 3 yrs for polyps   EGD: has had unsure of last one    Family Hx of cancer:  Father: colon cancer  Brother colon cancer  Paternal uncle : stomach cancer  Maternal Uncle : lung cancer  Maternal Uncle :  bone cancer   Maternal uncle : stomach cancer    Retired: worked as a    x 23 yrs; wife Lizet Quezada at bedside. Has 2 boys from first marriage; 15 grandchildren and 6 great grandchildren      Interval History:     Still having pain under rt scapula that radiates under the rt rib area. Still having SOB; thinks a lot of SOB is from abd area pushing up on his lungs. States he is not up on his feet more than 50% of the day due to PVD causing leg pain. He and his wife have discussed his situation and he states he is not interested in palliative chemotherapy. He wants to pursue comfort care. Wife at bedside. Past Medical History:   Diagnosis Date    Arthritis     hands/fingers-OSTEO    CAD (coronary artery disease)     involving native coronary artery of native heart without angina pectoris    Cardiomyopathy ([address] 01/20/2017    A. Echo (1/19/17):  EF 5-10% with severe GHK,. Mildly dil LA. Mild TR. PASP 46./systemic cardiomyopathy    Chronic kidney disease     Chronic obstructive pulmonary disease (HCC)     Chronic renal insufficiency 03/06/2017    Chronic systolic congestive heart failure, NYHA class 3 ([address] 1/8/2019    Claudication ([address] 10/9/2018    Congestive heart failure ([address]     Diabetes mellitus ([address] 3/6/2017    IDDM    Dyslipidemia 3/6/2017    A. FLP (1/19/17): Tot 120, , HDL 19, LDL 76 (no Rx).  H/O: GI bleed 3/6/2017    Hepatitis C 3/6/2017    Hypokalemia     ICD (implantable cardioverter-defibrillator) in place     Ill-defined condition     flu 1/2018     Neuropathy 11/19/2018    Noncompliance with medication regimen 9/11/2018    Paroxysmal atrial fibrillation ([address] 3/6/2017    Peripheral vascular disease ([address] 9/18/2017    A. RICKY (3/20/17): Right 0.58, Left 0.56.     Seizures ([address]     WITH HEPARIN    Tobacco dependence syndrome 10/9/2018    Vitamin D deficiency 6/4/2018    Vitamin deficiency 2019      Past Surgical History:   Procedure Laterality Date    CARDIAC SURG PROCEDURE UNLIST      CORONARY STENTX 3 2 CORONARY, & 1 FEMORAL    COLONOSCOPY N/A 2017    COLONOSCOPY performed by Vicente Garcia. Ramsey Quiñones MD at [facility] COLONOSCOPY N/A 2018    COLONOSCOPY performed by Vicente Garcia. Ramsey Quiñones MD at [facility] ENDOSCOPY    COLONOSCOPY N/A 10/22/2019    COLONOSCOPY performed by Faraz Bentley MD at [facility] ENDOSCOPY    HX CORONARY STENT PLACEMENT      LM, RCA x 2     HX ENDOSCOPY  10/22/2019    [facility]     HX GI      colonoscopy x 3 - last 2017 - polyp    HX IMPLANTABLE CARDIOVERTER DEFIBRILLATOR      HX OTHER SURGICAL      HX PACEMAKER      AICD    VASCULAR SURGERY PROCEDURE UNLIST  2017    STENT - RIGHT FEMORAL       Social History     Tobacco Use    Smoking status: Current Every Day Smoker     Packs/day: 1.00     Years: 55.00     Pack years: 55.00    Smokeless tobacco: Never Used    Tobacco comment:     Substance Use Topics    Alcohol use: Yes     Comment: 1 BEER WEEKLY      Family History   Problem Relation Age of Onset    Hypertension Mother     Heart Disease Mother         MURMUR    Cancer Father         COLON    Colon Cancer Father     Other Sister         born totally handicapped/epileptic    Kidney Disease Sister          from renal shutdown    Heart Disease Brother     Other Brother         ?pancreatic cancer or ?pancreatitis    Cancer Brother         PANCREATIC?  AND COLON    Cancer Maternal Uncle         toes and spread    Cancer Paternal Uncle         stomach    Heart Attack Maternal Grandfather 47    Cancer Paternal Grandfather         bone    Cancer Maternal Uncle         stomach    Cancer Maternal Uncle         lung    No Known Problems Son     No Known Problems Son     Anesth Problems Neg Hx      Current Facility-Administered Medications   Medication Dose Route Frequency    levoFLOXacin (LEVAQUIN) 250 mg in D5W IVPB  250 mg IntraVENous Q24H    torsemide (DEMADEX) tablet 10 mg  10 mg Oral PRN    ipratropium (ATROVENT) 0.02 % nebulizer solution 0.5 mg  0.5 mg Nebulization Q4H PRN    morphine IR (MS IR) tablet 15 mg  15 mg Oral Q6H    promethazine (PHENERGAN) tablet 25 mg  25 mg Oral Q6H PRN    sodium chloride (NS) flush 5-40 mL  5-40 mL IntraVENous Q8H    sodium chloride (NS) flush 5-40 mL  5-40 mL IntraVENous PRN    polyethylene glycol (MIRALAX) packet 17 g  17 g Oral DAILY PRN    nicotine (NICODERM CQ) 21 mg/24 hr patch 1 Patch  1 Patch TransDERmal DAILY    fondaparinux (ARIXTRA) injection 2.5 mg  2.5 mg SubCUTAneous Q24H    sacubitriL-valsartan (ENTRESTO) 49-51 mg tablet 1 Tab  1 Tab Oral BID    metoprolol succinate (TOPROL-XL) XL tablet 25 mg  25 mg Oral DAILY    potassium chloride SR (KLOR-CON 10) tablet 20 mEq  20 mEq Oral BID    atorvastatin (LIPITOR) tablet 40 mg  40 mg Oral DAILY    ergocalciferol capsule 50,000 Units  50,000 Units Oral Q7D    glucose chewable tablet 16 g  4 Tab Oral PRN    dextrose (D50W) injection syrg 12.5-25 g  25-50 mL IntraVENous PRN    glucagon (GLUCAGEN) injection 1 mg  1 mg IntraMUSCular PRN    insulin glargine (LANTUS) injection 32 Units  0.4 Units/kg SubCUTAneous QHS    insulin lispro (HUMALOG) injection   SubCUTAneous Q6H    morphine injection 2 mg  2 mg IntraVENous Q2H PRN      Allergies   Allergen Reactions    Heparin (Porcine) Other (comments)     Was told when in the hospital that if he received heparin again that it would be deadly. Review of Systems: A complete review of systems was obtained, negative except as described above.       Physical Exam:     Visit Vitals  /64 (BP 1 Location: Left arm, BP Patient Position: At rest)   Pulse 60   Temp 98.1 °F (36.7 °C)   Resp 16   Ht 5' 10\" (1.778 m)   Wt 184 lb 11.9 oz (83.8 kg)   SpO2 97%   BMI 26.51 kg/m²     ECOG PS: 3  General: appears older than stated age; no acute distress  Eyes: PERRLA, anicteric sclerae  HENT: Atraumatic with normal appearance of ears and nose; OP clear  Neck: Supple; no thyromegaly   Lymphatic: No cervical, supraclavicular, or axillary adenopathy  Respiratory: diminished breath sounds bilaterally R> L; no wheezing or rhonchi noted; normal respiratory effort: O2 in use  CV: Normal rate, regular rhythm, no murmurs, no peripheral edema  GI: Soft, nontender, nondistended, no masses, no hepatomegaly, no splenomegaly  MS:  Digits without clubbing or cyanosis. Skin: No rashes, ecchymoses, or petechiae. Normal temperature, turgor, and texture. Neuro/Psych: Alert, oriented, appropriate affect, normal judgment/insight      Results:     Lab Results   Component Value Date/Time    WBC 7.9 07/24/2020 05:51 AM    HGB 13.6 07/24/2020 05:51 AM    HCT 40.4 07/24/2020 05:51 AM    PLATELET 682 34/27/8905 05:51 AM    MCV 80.8 07/24/2020 05:51 AM    ABS. NEUTROPHILS 6.6 07/24/2020 05:51 AM     Lab Results   Component Value Date/Time    Sodium 122 (L) 07/24/2020 05:51 AM    Potassium 4.3 07/24/2020 05:51 AM    Chloride 89 (L) 07/24/2020 05:51 AM    CO2 25 07/24/2020 05:51 AM    Glucose 73 07/24/2020 05:51 AM    BUN 27 (H) 07/24/2020 05:51 AM    Creatinine 2.45 (H) 07/24/2020 05:51 AM    GFR est AA 32 (L) 07/24/2020 05:51 AM    GFR est non-AA 27 (L) 07/24/2020 05:51 AM    Calcium 8.9 07/24/2020 05:51 AM    Glucose (POC) 74 07/24/2020 12:34 PM     Lab Results   Component Value Date/Time    Bilirubin, total 0.5 07/22/2020 09:57 AM    ALT (SGPT) 31 07/22/2020 09:57 AM    Alk.  phosphatase 141 (H) 07/22/2020 09:57 AM    Protein, total 7.8 07/22/2020 09:57 AM    Albumin 3.2 (L) 07/22/2020 09:57 AM    Globulin 4.6 (H) 07/22/2020 09:57 AM     Lab Results   Component Value Date/Time    Iron % saturation 28 04/29/2019 11:56 AM    TIBC 246 (L) 04/29/2019 11:56 AM    Vitamin B12 291 11/19/2018 12:00 AM    Folate 8.9 11/19/2018 12:00 AM     (H) 02/14/2017 03:03 AM    TSH 1.250 06/23/2020 12:00 AM    Lipase 201 07/22/2020 09:57 AM    Hep C  virus Ab Interp. REACTIVE (A) 02/14/2017 04:29 PM    Hepatitis C virus Ab >11.90 02/14/2017 04:29 PM     Lab Results   Component Value Date/Time    INR 1.3 (H) 02/22/2017 03:16 AM    aPTT 28.2 02/15/2017 04:24 AM     Lab Results   Component Value Date/Time     (H) 02/14/2017 03:03 AM     7/22/2020 XR CHEST   IMPRESSION: New asymmetric right-sided airspace disease with right pleural  effusion    7/22/2020 CTA CHEST   IMPRESSION:   Imaging findings consistent with lung neoplasm with widespread metastatic  disease. Hilar and mediastinal lymphadenopathy. Hepatic metastatic metastases. Retroperitoneal and mesenteric lymphadenopathy as well.     There is significant attenuation of pulmonary arterial vessels extending to the  right upper lobe without pulmonary embolism identified. Small possibly malignant  right-sided pleural effusion.     Hepatic mass lesions are amenable to percutaneous sampling.    7/23/2020 NM Bone scan  IMPRESSION: No evidence of osseous metastatic disease. Right hilar mass is  compatible with malignancy. 7/23/2020 CT A/P  IMPRESSION:  1. Too numerous to count hepatic metastases. 2.  Retroperitoneal adenopathy in the upper abdomen. 3.  Distended gallbladder. Dilated common bile duct. The etiology is unclear but  may be secondary to parapancreatic adenopathy. Recommend correlation with serum  alkaline phosphatase and signs and symptoms of cholecystitis. 4.  Moderate right pleural effusion. Assessment and Recommendations:     1. Small cell carcinoma of lung, left: Extensive stage (Stage IV). With extensive mediastinal, mesenteric and retroperitoneal lymphadenopathy and liver metastases. -NM Bone scan : No evidence of osseous metastatic disease. This disease is not curable, but is treatable with palliative chemotherapy. Pt states he and his wife have discussed his case and he does not want to pursue palliative treatment. He would prefer to pursue comfort care.  I discussed that without treatment his prognosis would be a few weeks and with treatment prognosis is 6-12 months or more; albeit with higher complications possible due to his CHF. Pt wants to pursue hospice. -- Pt wants to pursue hospice, which is reasonable given his comorbidities and incurable cancer. -- Hospice evaluation  -- No need for brain MRI. ICD turned off.  -- I will sign off, but please call if questions/concerns. 2. Dyspnea  Likely 2/2 to new lung mass and large rt pleural effusion  Consulted pulmonary to assess for possible thoracentesis and to establish care as outpatient  Continue with supportive care    3. HFrEF/CAD   ECHO 6/15/20 LVEF 20-15%  Cardiology following    4. CHACORTA on CKD/ Hyponatremia  Concern for SIADH. Nephrology following    5. T2DM  Management per primary team    6. Pain  Continue prn medication. Palliative consulted. Would benefit from outpatient palliative follow up, given likely metastatic lung cancer. 7. Distended gallbladder  Noted on CT A/P  Not currently symptomatic although Alk phos is elevated:   Continue to monitor for symptoms and consider GI consult or General surgery consult if symptomatic    8. Goals of care: Incurable. Pt wants to pursue hospice. Appreciate palliative team assistance.       Signed By: Davina Castañeda MD

## 2020-07-24 NOTE — PROGRESS NOTES
7/24/2020 4:38 PM AVS with medications sent to [facility] via [employer] [facility]. 7/24/2020 3:52 PM Pt and pt's wife have complete intake session with DR GENNARO YOON [facility], [place] yin. [facility] has accepted pt and planning to admit pt at home after 2PM on 7/25. Pt is aware and agreeable. Pt's wife will transport pt home on 7/25. Pt signed 2nd IMM letter. 7/24/2020 1:26 PM Hospice order received. Met with pt and pt's wife, choice of hospice agency provided. Pt signed choice letter for [facility], referral sent via [employer]. CM spoke with [facility] liaison who will be to [facility] in 30 mins to meet with pt and pt's wife at bedside. 7/24/2020 8:18 AM EMR reviewed and spoke with primary team. CM will follow for [address] discussion from Palliative. LYNN Solis      Transitions of Care Plan:  1. Home with hospice through [facility] on 7/25 after 2PM   2. Family will transport home  3. Pt is a VBP and has a RUR score of 11%    Nursing on day of discharge please call [facility] to notify of discharge [phone].     Care Management Interventions  Mode of Transport at Discharge: Self  Current Support Network: Lives with Spouse  Confirm Follow Up Transport: Family  The Plan for Transition of Care is Related to the Following Treatment Goals : hospice  The Patient and/or Patient Representative was Provided with a Choice of Provider and Agrees with the Discharge Plan?: Yes  Freedom of Choice List was Provided with Basic Dialogue that Supports the Patient's Individualized Plan of Care/Goals, Treatment Preferences and Shares the Quality Data Associated with the Providers?: Yes  Discharge Location  Discharge Placement: Home with hospice

## 2020-07-24 NOTE — PROGRESS NOTES
Cardiology Progress Note       4th floor   NAME:  Gunnar Markham. :   1950   MRN:   [id number]     Assessment/Plan:   1. Dyspnea with new R sided lung mass and pleural effusion: Early Izaiah MRI pending , s/p R thoracentesis . On nasal canula 2 liters,   2. A/C systolic CHF d/t ICM, Stage C, NYHA Class II, LVEF 20-25% , s/p ICD for SCD prevention. Resumed toprol and entresto. Stop demedex and entresto. Given worsening Na and renal function. Try resuming entresto tomorrow at a lower dose if renal function better in am.   3.  Hyponatremia: - Low Na+ likely from SIADH -- His Na+ is low off of all diuretics past 2 weeks   4. CAD s/p Impella assisted hrPCI - MIKE to 100%pRCA and 70% LM - 17 . Cont asa/statin. 5.  HTN: stable   6. PAF:   Watchman aborted due to thrombus along coumadin ridge, not taking eliquis 5mg d/t to cost, only on ASA 81mg- f/u with Dr. Karen Emanuel. Stopped amiodarone due to Hyperthyroidism, check thyroid profile every 3 months and PFT/DLCO every year. Not on anticoagulation d/t cost.   7.  CKD stage III: nephrology following , ? Dose tolvaptan today. CARDIOLOGY ATTENDING  Patient personally seen and examined. All the elements of history and examination were personally performed. Assessment and plan was discussed and agree as written above    He feels pain / weakness from cancer is getting worse. Breathing is OK. Hrt RRR with no murmur. Lungs clear, no edema     CHF ---- will cut back Entresto and hold torsemide due to CHACORTA. Defer diuretics at this time to Nephrology in setting of hyponatremia. Will turn off of ICD if patient decides to go home with hospice. We will see back Monday -- please call if any questions over the weekend      Shara Monte MD, [facility]              Subjective:   Antonino Curran Jr. is a 81 y. o. male with PMHx of CAD, COPD, CHF (EF-20%), T2DM, CKD3, and hx of GIB who presents to the ED complaining of increasing SOB and shoulder / chest pain.       The pt states that about a month ago he began to [facility] and R shoulder pain. He also has had worsening dyspnea on exertion with wheezing, night sweats, cough with gray sputum, diarrhea, decreased PO intake and abdominal 'soreness'. Symptoms progressed and he felt poorly and stopped all his medications 2 weeks ago.        Workup in ER  showed a new large lung mass with metastatic disease.      CXR 7/22/20 - New asymmetric right-sided airspace disease with right pleural effusion     Chest CTA 7/22/20 - There is a large right upper lobe mass lesion. Extensive mediastinal lymphadenopathy. Numerous hepatic hypodensities consistent with metastatic disease. Mesenteric and retroperitoneal lymphadenopathy as well. There is a moderate to large right-sided pleural effusion.      RUL lung mass, extensive mediastinal, mesenteric and retroperitoneal  lymphadenopathy and numerous hepatic hyodensities  Concerning for widespread metastatic disease likely from lung primary  7/22/ liver bx : path pending        Cardiac ROS: Patient denies any exertional chest pain, palpitations, syncope, orthopnea, edema or paroxysmal nocturnal dyspnea. Previous Cardiac Eval  ECHO (2/13/17): EF 10% severe GHK, PSM. Dil LA. Mod MR. Mild to mod TR. Left pleural effusion  ECHO (1/19/17): EF 5-10% with severe GHK,. Mildly dil LA. Mild TR. PASP 46. ECHO: (5/15/17) EF 25%, severe GHK, basal-mid inferior AK, severe LAE, Mod MR, Mild TR,  ECHO 7/11/2018: LVD, EF 25%, severe GHK, AK  basal-mid inferior  Wall. G1DD, mild- mod LAE, mild MR/TR, RVSP 42 mmHg. AO root dilation 4.3       CATH (1/20/17): LM ost70. LAD m50. D1 80. LCx d70; OM1 99, OM2 90. RCA p100. No AVG. PAP 53/27/36. --- s/p PCI (2/9/17): pRCA 2.5x38 Xience + 2.75x12 Xience. ostLM 4.0x12 Xience post-dil with 4.5x12 NC.     ICD (6/12/17, Anup): [employer] single lead    CARDIAC MRI 1/31/17:LVEF 10%, LVH, RV dilated RVEF 25% GHK, marked LAE, mod CB, mild MR, mod-severe TR, The entire LAD territory : largely viable and should recover upon  revascularization. The entire RCA territory is largely viable and should recover  upon revascularization. The LCx territory demonstrate medium-size infarct with  limited viability. Large left-sided pleural effusion with passive atelectasis of the left lung. RICKY (3/20/17): R: 0.58, L: 0.56    LE arteriogm 2018: bilateral occluded SFA      Review of Systems: chest and back pain. No nausea, indigestion, vomiting, cough, sputum. No bleeding. Poor po intake. Objective:     Visit Vitals  /76 (BP 1 Location: Left arm, BP Patient Position: At rest)   Pulse 88   Temp 98.5 °F (36.9 °C)   Resp 16   Ht 5' 10\" (1.778 m)   Wt 184 lb 11.9 oz (83.8 kg)   SpO2 90%   BMI 26.51 kg/m²    O2 Flow Rate (L/min): 2 l/min O2 Device: Nasal cannula    Temp (24hrs), Av.1 °F (36.7 °C), Min:97.9 °F (36.6 °C), Max:98.5 °F (36.9 °C)      No intake/output data recorded.  1901 -  0700  In: -   Out: 200 [Urine:200]  TELE: SR/ST     General: AAOx3 cooperative, no acute distress. HEENT: Atraumatic. Pink and moist.  Anicteric sclerae. Neck : Supple. Lungs: R side diminished. Heart: Regular rhythm, no murmur, no rubs, no gallops. No JVD. No carotid bruits. Abdomen: Soft, non-distended, non-tender. + Bowel sounds. Extremities: No edema, no clubbing, no cyanosis. Neurologic: Grossly intact. Alert and oriented X 3. No acute neurological distress. Psych: Good insight. Not anxious or agitated.         Care Plan discussed with:    Comments   Patient x    Family      RN x    Care Manager                    Consultant:  x        Data Review:     No lab exists for component: ITNL   Recent Labs     20  1059   TROIQ <0.05     Recent Labs     20  0551 20  1545 20  0512  20  0957   * 124* 125*   < > 124*   K 4.3 4.3 3.9   < > 3.9   CL 89* 90* 92*   < > 90*   CO2 25 24 25   < > 23   BUN 27* 24* 24*   < > 20   CREA 2.45* 1.65* 1.59*   < > 1.35*   GLU 73 99 105*   < > 158*   ALB  --   --   --   --  3.2*   WBC 7.9  --  7.7  --  7.6   HGB 13.6  --  13.7  --  16.0   HCT 40.4  --  40.6  --  46.6     --  202  --  210    < > = values in this interval not displayed. No results for input(s): INR, PTP, APTT, INREXT, INREXT in the last 72 hours.     Medications reviewed  Current Facility-Administered Medications   Medication Dose Route Frequency    tolvaptan (SAMSCA) tablet 15 mg  15 mg Oral ONCE    levoFLOXacin (LEVAQUIN) 250 mg in D5W IVPB  250 mg IntraVENous Q24H    torsemide (DEMADEX) tablet 10 mg  10 mg Oral PRN    ipratropium (ATROVENT) 0.02 % nebulizer solution 0.5 mg  0.5 mg Nebulization Q4H PRN    morphine IR (MS IR) tablet 15 mg  15 mg Oral Q6H    promethazine (PHENERGAN) tablet 25 mg  25 mg Oral Q6H PRN    sodium chloride (NS) flush 5-40 mL  5-40 mL IntraVENous Q8H    sodium chloride (NS) flush 5-40 mL  5-40 mL IntraVENous PRN    polyethylene glycol (MIRALAX) packet 17 g  17 g Oral DAILY PRN    nicotine (NICODERM CQ) 21 mg/24 hr patch 1 Patch  1 Patch TransDERmal DAILY    fondaparinux (ARIXTRA) injection 2.5 mg  2.5 mg SubCUTAneous Q24H    sacubitriL-valsartan (ENTRESTO) 49-51 mg tablet 1 Tab  1 Tab Oral BID    metoprolol succinate (TOPROL-XL) XL tablet 25 mg  25 mg Oral DAILY    potassium chloride SR (KLOR-CON 10) tablet 20 mEq  20 mEq Oral BID    atorvastatin (LIPITOR) tablet 40 mg  40 mg Oral DAILY    ergocalciferol capsule 50,000 Units  50,000 Units Oral Q7D    glucose chewable tablet 16 g  4 Tab Oral PRN    dextrose (D50W) injection syrg 12.5-25 g  25-50 mL IntraVENous PRN    glucagon (GLUCAGEN) injection 1 mg  1 mg IntraMUSCular PRN    insulin glargine (LANTUS) injection 32 Units  0.4 Units/kg SubCUTAneous QHS    insulin lispro (HUMALOG) injection   SubCUTAneous Q6H    morphine injection 2 mg  2 mg IntraVENous Q2H PRN         Yasmeen Alcantara NP

## 2020-07-24 NOTE — DISCHARGE INSTRUCTIONS
Scarlett Payne. / [id number] : 1950    Admission date: 2020 Discharge date: 2020 8:54 PM     Please bring this form with you to show your care provider at your follow-up appointment. Primary care provider:  Veronique Mchugh MD    Discharging provider:  Kvng Sahu MD  - Family Medicine Resident  Augustine Edwards MD - Family Medicine Attending      You have been admitted to the hospital with the following diagnoses:    ACUTE DIAGNOSES:  Shortness of breath [R06.02]  Neoplasm of lung [D49.1]    . . . . . . . . . . . . . . . . . . . . . . . . . . . . . . . . . . . . . . . . . . . . . . . . . . . . . . . . . . . . . . . . . . . . . . . .   You are well enough to be discharged from the hospital. However, because you were inpatient in a hospital, you are at greater risk of having been exposed to the coronavirus. PLEASE stay inside and self-quarantine for 14 days to prevent further spread of the coronavirus. . . . . . . . . . . . . . . . . . . . . . . . . . . . . . . . . . . . . . . . . . . . . . . . . . . . . . . . . . . . . . . . . . . . . . . . .   . . . . . . . . . . . . . . . . . . . . . . . . . . . . . . . . . . . . . . . . . . . . . . . . . . . . . . . . . . . . . . . . . . . . . . . Theron Curz FOLLOW-UP CARE RECOMMENDATIONS:    Appointments  Follow-up Information    None            Follow-up tests needed: none    Pending test results: At the time of your discharge the following test results are still pending: liver biopsy, brain MRI. Please make sure you review these results with your outpatient follow-up provider(s). DIET/what to eat:  Diabetic Diet    ACTIVITY:  Activity as tolerated    Wound care: none    Equipment needed:  none    Specific symptoms to watch for: chest pain, shortness of breath, fever, chills, nausea, vomiting, diarrhea, change in mentation, falling, weakness, bleeding. What to do if new or unexpected symptoms occur?     If you experience any of the above symptoms (or should other concerns or questions arise after discharge) please call your primary care physician. Return to the emergency room if you cannot get hold of your doctor. · It is very important that you keep your follow-up appointment(s). · Please bring discharge papers, medication list (and/or medication bottles) to your follow-up appointments for review by your outpatient provider(s). · Please check the list of medications and be sure it includes every medication (even non-prescription medications) that your provider wants you to take. · It is important that you take the medication exactly as they are prescribed. · Keep your medication in the bottles provided by the pharmacist and keep a list of the medication names, dosages, and times to be taken in your wallet. · Do not take other medications without consulting your doctor. · If you have any questions about your medications or other instructions, please talk to your nurse or care provider before you leave the hospital.     Information obtained by:     I understand that if any problems occur once I am at home I am to contact my physician. These instructions were explained to me and I had the opportunity to ask questions. I understand and acknowledge receipt of the instructions indicated above.                                                                                                                                                Physician's or R.N.'s Signature                                                                  Date/Time                                                                                                                                              Patient or Representative Signature                                                          Date/Time

## 2020-07-24 NOTE — PROGRESS NOTES
Patient refused MRI after the pacemaker rep came in and turned off his pacemaker. Nurse has been aware and his MRI will be cancelled.

## 2020-07-24 NOTE — PROGRESS NOTES
Ansley 71. YOB: 1950          Assessment & Plan:   CHACORTA, worse. Cardiorenal syndrome + contrast + ?diuretics  Hyponatremia, worse  CKD 3, not regularly following with nephrology  New dx apparent lung cancer with mets  Pleural effusion s/p thoracentesis  Ischemic CMP  HFrEF  DM2  HTN  HL  Tobacco abuse    Rec:  Hold loop diuretics  Tolvaptan 15 mg x 1  May need to back off Entresto       Subjective:   CC: f/u CHACORTA  HPI: Renal function worse. Torsemide held. Na down to 122. Thoracentesis yesterday.   ROS: Less sob, no n/v  Current Facility-Administered Medications   Medication Dose Route Frequency    levoFLOXacin (LEVAQUIN) 250 mg in D5W IVPB  250 mg IntraVENous Q24H    torsemide (DEMADEX) tablet 10 mg  10 mg Oral PRN    ipratropium (ATROVENT) 0.02 % nebulizer solution 0.5 mg  0.5 mg Nebulization Q4H PRN    morphine IR (MS IR) tablet 15 mg  15 mg Oral Q6H    promethazine (PHENERGAN) tablet 25 mg  25 mg Oral Q6H PRN    sodium chloride (NS) flush 5-40 mL  5-40 mL IntraVENous Q8H    sodium chloride (NS) flush 5-40 mL  5-40 mL IntraVENous PRN    polyethylene glycol (MIRALAX) packet 17 g  17 g Oral DAILY PRN    nicotine (NICODERM CQ) 21 mg/24 hr patch 1 Patch  1 Patch TransDERmal DAILY    fondaparinux (ARIXTRA) injection 2.5 mg  2.5 mg SubCUTAneous Q24H    sacubitriL-valsartan (ENTRESTO) 49-51 mg tablet 1 Tab  1 Tab Oral BID    metoprolol succinate (TOPROL-XL) XL tablet 25 mg  25 mg Oral DAILY    potassium chloride SR (KLOR-CON 10) tablet 20 mEq  20 mEq Oral BID    atorvastatin (LIPITOR) tablet 40 mg  40 mg Oral DAILY    ergocalciferol capsule 50,000 Units  50,000 Units Oral Q7D    glucose chewable tablet 16 g  4 Tab Oral PRN    dextrose (D50W) injection syrg 12.5-25 g  25-50 mL IntraVENous PRN    glucagon (GLUCAGEN) injection 1 mg  1 mg IntraMUSCular PRN    insulin glargine (LANTUS) injection 32 Units  0.4 Units/kg SubCUTAneous QHS    insulin lispro (HUMALOG) injection   SubCUTAneous Q6H    morphine injection 2 mg  2 mg IntraVENous Q2H PRN          Objective:     Vitals:  Blood pressure 115/76, pulse 88, temperature 98.5 °F (36.9 °C), resp. rate 16, height 5' 10\" (1.778 m), weight 83.8 kg (184 lb 11.9 oz), SpO2 90 %. Temp (24hrs), Av.1 °F (36.7 °C), Min:97.9 °F (36.6 °C), Max:98.5 °F (36.9 °C)      Intake and Output:  No intake/output data recorded.  1901 -  0700  In: -   Out: 200 [Urine:200]    Physical Exam:               GENERAL ASSESSMENT: Chronically ill NAD  CHEST: Dec at bases  HEART: S1S2  ABDOMEN: Soft,NT, protuberant  EXTREMITY: no EDEMA  NEURO: Grossly non focal          ECG/rhythm:    Data Review      No results for input(s): TNIPOC in the last 72 hours. No lab exists for component: ITNL   Recent Labs     20  1059   TROIQ <0.05     Recent Labs     20  0551 20  1545 20  0512  20  0957   * 124* 125*   < > 124*   K 4.3 4.3 3.9   < > 3.9   CL 89* 90* 92*   < > 90*   CO2 25 24 25   < > 23   BUN 27* 24* 24*   < > 20   CREA 2.45* 1.65* 1.59*   < > 1.35*   GLU 73 99 105*   < > 158*   CA 8.9 8.4* 8.6   < > 9.0   ALB  --   --   --   --  3.2*   WBC 7.9  --  7.7  --  7.6   HGB 13.6  --  13.7  --  16.0   HCT 40.4  --  40.6  --  46.6     --  202  --  210    < > = values in this interval not displayed. No results for input(s): INR, PTP, APTT, INREXT in the last 72 hours. Needs: urine analysis, urine sodium, protein and creatinine  Lab Results   Component Value Date/Time    Sodium,urine random 10 2020 07:10 AM    Creatinine, urine 143.00 2020 03:40 PM           : Dusty Goodman MD  2020        [facility]:  [email or web address]. [email or web address]  Julia Gann office:  [address] [address] [place] [address]  Phone: [phone]  Fax :     [phone]    [place] office:  Josephine Delaney [address] [facility], [address] [place] [address]  Phone - [phone]  Fax - [phone]

## 2020-07-24 NOTE — PROGRESS NOTES
[facility] RESIDENCY PROGRAM  PROGRESS NOTE     7/25/2020  PCP: Leslie Parry MD     Assessment/Plan:     Shireen Herron. is a 81 y.o. male with PMHx of CHF, CAD, paroxismal a-fib COPD, T2DM, CKD3, and hx of GIB who is admitted for dyspnea and CHF exacerbation. 24-hour Events: No acute events overnight. Pt stated desire to discharge on hospice.     Dyspnea 2/2 likely malignant pleural effusion in the setting of COPD: CT chest w/ findings consistent w/ lung neoplasm w/ widespread metastatic disease, small R-sided pleural effusion. Ct A/P w/ numerous hepatic metastases and distended gallbladder/CBD. Bone scan w/o evidence of bony metastases. Pt underwent liver biopsy 7/22, awaiting pathology results. Lower suspicion for infectious process, however procalcitonin is elevated at 0.95. BCx NGTD. -pt to discharge w/ [facility] this afternoon  -consult to oncology, pt declines brain MRI at this time  -consult to palliative care - appreciate recs       -morphine IR 15mg PO Q6H and morphine 2 mg IV q2h PRN       -miralax PRN       -palliative will continue follow and adjust pain regimen as needed       -hospice for pain control on discharge  - consult to pulmonary - appreciate recs       - can consider placing a pleural catheter for comfort       -O2 via nasal canula PRN to maintain O2 sats >88%  -d/c levaquin on discharge     Systolic heart failure w/ reduced ejection fraction (20-25%), NYHA Class II: Pt has ICD. POA BNP 10,129. Echo (6/15/20) w/ LVEF 20-25%. Pt follows at [place] [address] (Dr. Evangelista Foreman). Per chart, patient had not been taking his prescribed medications in the 2 weeks prior to admission. -AICD deactivated yesterday  -d/c torsemide due to CHACORTA  -strict Is/Os  -daily weights  -continue home entresto 49-51mg   -continue home metoprolol 25mg daily   -d/c home meds on discharge    CHACORTA on CKD3: Creatinine 1.65>2.45>4. 17. Elevated urine protein.  Pt restarted on torsemide, also s/p CT abd/chest/pelvis w/ contrast.  -Q12 BMP  -continue home KCl 20meq tab BID - d/c on discharge     Hyponatremia likely 2/2 SIADH: POA Na 124, stable. FENa of 0.1%, prerenal. S/p torsemide x1.  -hold torsemide  -trend BMP Q12H     CAD:  -continue home atorvastatin 40mg daily - d/c on discharge  -hold home ASA 81mg     T2DM: A1C 6/10/20 12.6. Per med rec pt takes NPH/regular 70/30 10u QAM, 8U QPM.  -Holding home insulin 70/30 - do not restart on d/c  -SSI Q6H, Lantus 32 Units QHS (weight-based)  -Q6H glucose checks.  -Hypoglycemia protocols ordered. -Goal glucose concentration >70, <140 pre-meal and <180 with all random glucoses. Tobacco Use Disorder: Pt has 100 pack year smoking history. -current 2ppd user  -nicotine patch while in the hospital     FEN/GI - Cardiac diet   DVT prophylaxis - fondaparinux - pt has hx of HIT  GI prophylaxis - Not indicated at this time  Code Status - Partial. Pt is DNI. Discussed with patient / caregivers. Next of Kin Name and [address] [facility] [address] [address],  Spouse - [phone]    Fernanda Salmeron. discussed with Dr. Satish Faria. Subjective:   Pt was seen and examined at bedside. Afebrile and hemodynamically stable. Pt doing well overnight, states his desire to discharge on hospice. Pain well controlled on current regimen. Denies fever, chills, nausea, SOB, chest pain.      Objective:   Physical examination  Patient Vitals for the past 24 hrs:   Temp Pulse Resp BP SpO2   20 0836 97.9 °F (36.6 °C) (!) 102 16 105/70 90 %   20 0747  (!) 101      20 0314 98 °F (36.7 °C) 100 16 102/65 90 %   20 2323 97.9 °F (36.6 °C) (!) 101 16 128/56 94 %   20 2130     (!) 86 %   20 2101 98.3 °F (36.8 °C) 100 16 110/71 93 %   20 1511 98.1 °F (36.7 °C) 60 16 108/64 97 %   20 1500  (!) 101      20 1130 98.4 °F (36.9 °C) 84 17 108/66 92 %      Temp (24hrs), Av.1 °F (36.7 °C), Min:97.9 °F (36.6 °C), Max:98.4 °F (36.9 °C)     O2 Flow Rate (L/min): 2 l/min   O2 Device: Nasal cannula    Date 07/24/20 0700 - 07/25/20 0659 07/25/20 0700 - 07/26/20 0659   Shift [id number] 1900-0659 24 Hour Total 0700-1859 1900-0659 24 Hour Total   INTAKE   P.O. 220  220        P. O. 220  220      I. V.(mL/kg/hr) 50(0.1) 0(0) 50(0)        Volume (levoFLOXacin (LEVAQUIN) 250 mg in D5W IVPB) 50 0 50      Shift Total(mL/kg) 270(3.3) 0(0) 270(3.3)      OUTPUT   Urine(mL/kg/hr)           Urine Occurrence(s) 2 x  2 x      Stool           Stool Occurrence(s) 0 x  0 x      Shift Total(mL/kg)          0 270      Weight (kg) 82.1 82.1 82.1 82.1 82.1 82.1     General:   Alert, cooperative, no acute distress, sitting upright in bedside chair    Head:   Atraumatic   Eyes:   Conjunctivae clear   ENT:  Oral mucosa normal   Lungs:   Wheezing noted in b/l upper lung fields R>L, good air movement throughout. Heart:   Normal rate, regular rhythm, no murmur   Abdomen:    Normoactive bowel sounds. Moderate abdominal distention, no tenderness, rebound, or guarding    Extremities:  No edema. R shoulder non-tender to palpation    Pulses:  Symmetric all extremities   Skin:  Warm and dry    No rashes or lesions   Neurologic:  Alert and oriented x4   No focal deficits     Data Review:     CBC:  Recent Labs     07/25/20  0453 07/24/20  0551 07/23/20  0512   WBC 10.5 7.9 7.7   HGB 13.5 13.6 13.7   HCT 40.3 40.4 40.6    946 498     Metabolic Panel:  Recent Labs     07/25/20  0453 07/24/20  0551 07/23/20  1545  07/22/20  0957   * 122* 124*   < > 124*   K 4.9 4.3 4.3   < > 3.9   CL 86* 89* 90*   < > 90*   CO2 26 25 24   < > 23   BUN 33* 27* 24*   < > 20   CREA 4.17* 2.45* 1.65*   < > 1.35*   GLU 72 73 99   < > 158*   CA 9.0 8.9 8.4*   < > 9.0   ALB  --   --   --   --  3.2*   TBILI  --   --   --   --  0.5   ALT  --   --   --   --  31    < > = values in this interval not displayed.      Micro:  Lab Results   Component Value Date/Time    Culture result: NO GROWTH AFTER 19 HOURS 07/23/2020 03:30 PM    Culture result: NO GROWTH 3 DAYS 07/22/2020 10:31 AM    Culture result: MRSA NOT PRESENT 02/14/2020 11:45 AM    Culture result:  02/14/2020 11:45 AM         Screening of patient nares for MRSA is for surveillance purposes and, if positive, to facilitate isolation considerations in high risk settings. It is not intended for automatic decolonization interventions per se as regimens are not sufficiently effective to warrant routine use. Imaging:  Cta Chest W Or W Wo Cont    Result Date: 7/22/2020  Clinical history: r/o pe INDICATION:   r/o pe COMPARISON: 1/19/2017 TECHNIQUE: CT of the chest with  IV contrast , Isovue-370 is performed. Axial images from the thoracic inlet to the level of the upper abdomen are obtained. Manual post-processing of the images and coronal reformatting is also performed. CT dose reduction was achieved through use of a standardized protocol tailored for this examination and automatic exposure control for dose modulation. Multiplanar reformatted imaging was performed. Sagittal and coronal reformatting. 3-D Postprocessing of imaging was performed. 3-D MIP reconstructed images were obtained in the coronal plane. FINDINGS: There is a large right upper lobe mass lesion. Extensive mediastinal lymphadenopathy. Numerous hepatic hypodensities consistent with metastatic disease. Mesenteric and retroperitoneal lymphadenopathy as well. There is a moderate to large right-sided pleural effusion. There is significant attenuation of pulmonary arterial vessels extending to the right upper lobe. Cardiac pacer in place. Left basilar atelectasis. Cardiomegaly is mild. . The aorta is normal in course and caliber. Hydropic gallbladder. IMPRESSION: Imaging findings consistent with lung neoplasm with widespread metastatic disease. Hilar and mediastinal lymphadenopathy. Hepatic metastatic metastases. Retroperitoneal and mesenteric lymphadenopathy as well. There is significant attenuation of pulmonary arterial vessels extending to the right upper lobe without pulmonary embolism identified. Small possibly malignant right-sided pleural effusion. Hepatic mass lesions are amenable to percutaneous sampling. The findings were called to Joyce Keen on 7/22/2020 at 10:50 AM by Dr. Milind Dudley. [address] [facility]    Result Date: 7/22/2020  CLINICAL HISTORY: Liver mass INDICATION: Liver mass FINDINGS: The patient was informed of the risks and benefits of the procedure. Risks including the risk of bleeding and the need for possible post biopsy embolization were discussed with the patient. After obtaining informed consent, the patient was brought to the ultrasonography suite and placed in the supine position. Ultrasonography through the abdomen was obtained so that a liver biopsy could be performed. The skin overlying the region of the mass was sterilely prepped and draped. One percent lidocaine was then injected into the subcutaneous tissues overlying the lesion. Next, a 18-gauge Temno core biopsy needle was advanced into the lesion utilizing intermittent ultrasonographic guidance. The patient was hemodynamically stable after the completion of the procedure and did not report any significant symptom. PROCEDURE:Successful ultrasound-guided liver mass biopsy : Kvng Dudley MD. PROCEDURE: Ultrasound-guided liver mass biopsy PREPROCEDURAL DIAGNOSIS: Liver lesion POSTPROCEDURAL DIAGNOSIS: Liver lesion ESTIMATED BLOOD LOSS: None. SPECIMENS REMOVED: 6, 18-gauge Core biopsy specimens COMPLICATIONS: None. IMPRESSION: Successful ultrasound-guided liver mass biopsy preliminary results consistent with neoplasm. Please see detailed pathology report for further information.     Xr Chest Port    Result Date: 7/22/2020  EXAM: XR CHEST PORT INDICATION: Shortness of breath COMPARISON: 11/14/2019 FINDINGS: A portable AP radiograph of the chest was obtained at 1009 hours. The patient is on a cardiac monitor. There is a pacemaker in place without lead fracture. Asymmetric right-sided airspace disease is noted with a small right pleural effusion. The lungs are hyperinflated. IMPRESSION: New asymmetric right-sided airspace disease with right pleural effusion    Medications reviewed  Current Facility-Administered Medications   Medication Dose Route Frequency    insulin glargine (LANTUS) injection 16 Units  0.2 Units/kg SubCUTAneous QHS    levoFLOXacin (LEVAQUIN) 250 mg in D5W IVPB  250 mg IntraVENous Q24H    torsemide (DEMADEX) tablet 10 mg  10 mg Oral PRN    ipratropium (ATROVENT) 0.02 % nebulizer solution 0.5 mg  0.5 mg Nebulization Q4H PRN    morphine IR (MS IR) tablet 15 mg  15 mg Oral Q6H    promethazine (PHENERGAN) tablet 25 mg  25 mg Oral Q6H PRN    sodium chloride (NS) flush 5-40 mL  5-40 mL IntraVENous Q8H    sodium chloride (NS) flush 5-40 mL  5-40 mL IntraVENous PRN    polyethylene glycol (MIRALAX) packet 17 g  17 g Oral DAILY PRN    nicotine (NICODERM CQ) 21 mg/24 hr patch 1 Patch  1 Patch TransDERmal DAILY    fondaparinux (ARIXTRA) injection 2.5 mg  2.5 mg SubCUTAneous Q24H    sacubitriL-valsartan (ENTRESTO) 49-51 mg tablet 1 Tab  1 Tab Oral BID    metoprolol succinate (TOPROL-XL) XL tablet 25 mg  25 mg Oral DAILY    potassium chloride SR (KLOR-CON 10) tablet 20 mEq  20 mEq Oral BID    atorvastatin (LIPITOR) tablet 40 mg  40 mg Oral DAILY    ergocalciferol capsule 50,000 Units  50,000 Units Oral Q7D    glucose chewable tablet 16 g  4 Tab Oral PRN    dextrose (D50W) injection syrg 12.5-25 g  25-50 mL IntraVENous PRN    glucagon (GLUCAGEN) injection 1 mg  1 mg IntraMUSCular PRN    insulin lispro (HUMALOG) injection   SubCUTAneous Q6H    morphine injection 2 mg  2 mg IntraVENous Q2H PRN         Signed:   Jorge Bryant DO  Family Medicine Resident      Attending note:    Attending note to follow

## 2020-07-24 NOTE — PROGRESS NOTES
Problem: Falls - Risk of  Goal: *Absence of Falls  Description: Document Maribel Yan Fall Risk and appropriate interventions in the flowsheet. Outcome: Progressing Towards Goal  Note: Fall Risk Interventions:            Medication Interventions: Teach patient to arise slowly, Patient to call before getting OOB, Utilize gait belt for transfers/ambulation    Elimination Interventions: Call light in reach, Patient to call for help with toileting needs, Stay With Me (per policy), Urinal in reach              Problem: Patient Education: Go to Patient Education Activity  Goal: Patient/Family Education  Outcome: Progressing Towards Goal     Problem: Pressure Injury - Risk of  Goal: *Prevention of pressure injury  Description: Document Trey Scale and appropriate interventions in the flowsheet.   Outcome: Progressing Towards Goal  Note: Pressure Injury Interventions:  Sensory Interventions: Keep linens dry and wrinkle-free, Minimize linen layers         Activity Interventions: Increase time out of bed    Mobility Interventions: Pressure redistribution bed/mattress (bed type)    Nutrition Interventions: Document food/fluid/supplement intake, Offer support with meals,snacks and hydration                     Problem: Patient Education: Go to Patient Education Activity  Goal: Patient/Family Education  Outcome: Progressing Towards Goal

## 2020-07-24 NOTE — PROGRESS NOTES
Nutrition:    7/24: RD consult received for decreased appetite/new cancer diagnosis. Supplements added TID. Noted pt is going home with hospice. No aggressive nutrition interventions appropriate at this time. Please consult again if needed.     [place], [address] [facility]  [phone]

## 2020-07-24 NOTE — PROGRESS NOTES
[facility]: [facility]   : 1950 Admit Date: 2020   Phone: [phone]  Room: [facility]   PCP: Reynaldo Aguiar MD  MRN: [id number]   Date: 2020  Code: Partial Code          Chart and notes reviewed. Data reviewed. I review the patient's current medications in the medical record at each encounter. I have evaluated and examined the patient. History of Present Illness:  Mr. Shasta Francois is a 80 yo with a history of COPD, CAD, COPD, sCHF, CKD, DM and tobacco abuse who presented with chest and back pain, found to have a CT chest with a RUL mass, LAD (mediastinal, mesenteric, and retroperitoneal) and numerous hepatic lesions. Pain is in the right scapula area and radiates through the right side of his chest. He has COPD and on Anoro at home, but has had progressive shortness of breath. He is getting nebs here and he says they are not helpful, would like to make PRN. Does not use O2 at home. Has an intermittent cough that is dry. Lost about 20lbs over the past 2 months. Has gotten a liver biopsy of the live lesions since admission. Labs: WBC 7.7, Hgb 13.7, , INR 1.3, cr 1.59,     Images:  CTA chest hilar and mediastinal LAD, hepatic mets, retroperitoneal and mesenteric LAD, R sided effusion    Interval history  Afebrile  Sats 90% on 2L  BP stable  Na 122 - worse  Creat 2.45 - worse  Thoracentesis with 1000ml serous fluid off - effusion is exudate (, protein 2.6)    ROS: States he initially felt better after thoracentesis, but he is now become more SOB and feels like the fluid may be reaccumulating. Denies CP. Denies abd pain. Denies fever or chills. Denies LE pain/swelling. Past Medical History:   Diagnosis Date    Arthritis     hands/fingers-OSTEO    CAD (coronary artery disease)     involving native coronary artery of native heart without angina pectoris    Cardiomyopathy ([facility] [address] 2017    A.   Echo (17):  EF 5-10% with severe GHK,.  Mildly dil LA. Mild TR. PASP 46./systemic cardiomyopathy    Chronic kidney disease     Chronic obstructive pulmonary disease (HCC)     Chronic renal insufficiency 03/06/2017    Chronic systolic congestive heart failure, NYHA class 3 ([address] 1/8/2019    Claudication ([address] 10/9/2018    Congestive heart failure ([address]     Diabetes mellitus ([address] 3/6/2017    IDDM    Dyslipidemia 3/6/2017    A. FLP (1/19/17): Tot 120, , HDL 19, LDL 76 (no Rx).  H/O: GI bleed 3/6/2017    Hepatitis C 3/6/2017    Hypokalemia     ICD (implantable cardioverter-defibrillator) in place     Ill-defined condition     flu 1/2018     Neuropathy 11/19/2018    Noncompliance with medication regimen 9/11/2018    Paroxysmal atrial fibrillation ([address] 3/6/2017    Peripheral vascular disease ([facility] [address] 9/18/2017    A. RICKY (3/20/17): Right 0.58, Left 0.56.  Seizures ([address]     WITH HEPARIN    Tobacco dependence syndrome 10/9/2018    Vitamin D deficiency 6/4/2018    Vitamin deficiency 1/29/2019       Past Surgical History:   Procedure Laterality Date    CARDIAC SURG PROCEDURE UNLIST      CORONARY STENTX 3 2 CORONARY, & 1 FEMORAL    COLONOSCOPY N/A 2/17/2017    COLONOSCOPY performed by Mani Galloway. Con Dugan MD at [address] COLONOSCOPY N/A 2/5/2018    COLONOSCOPY performed by Mani Galloway.  Con Dugan MD at [address] COLONOSCOPY N/A 10/22/2019    COLONOSCOPY performed by Mary Alice Hebert MD at [facility] ENDOSCOPY    HX CORONARY STENT PLACEMENT      LM, RCA x 2     HX ENDOSCOPY  10/22/2019    [facility]     HX GI      colonoscopy x 3 - last 2/2017 - polyp    HX IMPLANTABLE CARDIOVERTER DEFIBRILLATOR      HX OTHER SURGICAL      HX PACEMAKER      AICD    VASCULAR SURGERY PROCEDURE UNLIST  2017    STENT - RIGHT FEMORAL        Family History   Problem Relation Age of Onset    Hypertension Mother     Heart Disease Mother         MURMUR    Cancer Father         COLON    Colon Cancer Father     Other Sister         born totally handicapped/epileptic    Kidney Disease Sister          from renal shutdown    Heart Disease Brother     Other Brother         ?pancreatic cancer or ?pancreatitis    Cancer Brother         PANCREATIC? AND COLON    Cancer Maternal Uncle         toes and spread    Cancer Paternal Uncle         stomach    Heart Attack Maternal Grandfather 47    Cancer Paternal Grandfather         bone    Cancer Maternal Uncle         stomach    Cancer Maternal Uncle         lung    No Known Problems Son     No Known Problems Son     Anesth Problems Neg Hx        Social History     Tobacco Use    Smoking status: Current Every Day Smoker     Packs/day: 1.00     Years: 55.00     Pack years: 55.00    Smokeless tobacco: Never Used    Tobacco comment:     Substance Use Topics    Alcohol use: Yes     Comment: 1 BEER WEEKLY       Allergies   Allergen Reactions    Heparin (Porcine) Other (comments)     Was told when in the hospital that if he received heparin again that it would be deadly.        Current Facility-Administered Medications   Medication Dose Route Frequency    tolvaptan (SAMSCA) tablet 15 mg  15 mg Oral ONCE    levoFLOXacin (LEVAQUIN) 250 mg in D5W IVPB  250 mg IntraVENous Q24H    torsemide (DEMADEX) tablet 10 mg  10 mg Oral PRN    ipratropium (ATROVENT) 0.02 % nebulizer solution 0.5 mg  0.5 mg Nebulization Q4H PRN    morphine IR (MS IR) tablet 15 mg  15 mg Oral Q6H    promethazine (PHENERGAN) tablet 25 mg  25 mg Oral Q6H PRN    sodium chloride (NS) flush 5-40 mL  5-40 mL IntraVENous Q8H    sodium chloride (NS) flush 5-40 mL  5-40 mL IntraVENous PRN    polyethylene glycol (MIRALAX) packet 17 g  17 g Oral DAILY PRN    nicotine (NICODERM CQ) 21 mg/24 hr patch 1 Patch  1 Patch TransDERmal DAILY    fondaparinux (ARIXTRA) injection 2.5 mg  2.5 mg SubCUTAneous Q24H    sacubitriL-valsartan (ENTRESTO) 49-51 mg tablet 1 Tab  1 Tab Oral BID    metoprolol succinate (TOPROL-XL) XL tablet 25 mg  25 mg Oral DAILY    potassium chloride SR (KLOR-CON 10) tablet 20 mEq  20 mEq Oral BID    atorvastatin (LIPITOR) tablet 40 mg  40 mg Oral DAILY    ergocalciferol capsule 50,000 Units  50,000 Units Oral Q7D    glucose chewable tablet 16 g  4 Tab Oral PRN    dextrose (D50W) injection syrg 12.5-25 g  25-50 mL IntraVENous PRN    glucagon (GLUCAGEN) injection 1 mg  1 mg IntraMUSCular PRN    insulin glargine (LANTUS) injection 32 Units  0.4 Units/kg SubCUTAneous QHS    insulin lispro (HUMALOG) injection   SubCUTAneous Q6H    morphine injection 2 mg  2 mg IntraVENous Q2H PRN         REVIEW OF SYSTEMS   12 point ROS negative except as stated in the HPI. Physical Exam:   Visit Vitals  /76 (BP 1 Location: Left arm, BP Patient Position: At rest)   Pulse 88   Temp 98.5 °F (36.9 °C)   Resp 16   Ht 5' 10\" (1.778 m)   Wt 83.8 kg (184 lb 11.9 oz)   SpO2 90%   BMI 26.51 kg/m²       General:  Alert, cooperative, no distress, appears stated age. Head:  Normocephalic, without obvious abnormality, atraumatic. Eyes:  Conjunctivae/corneas clear. Nose: Nares normal. Septum midline. Mucosa normal.    Throat: Lips, mucosa, and tongue normal.    Neck: Supple, symmetrical, trachea midline, no adenopathy. Lungs:   Trace scattered wheeze, otherwise mostly clear to auscultation bilaterally, diminished in the right base   Chest wall:  No tenderness or deformity. Heart:  Regular rate and rhythm, S1, S2 normal, no murmur, click, rub or gallop. Abdomen:   Soft, non-tender. Bowel sounds normal.   Extremities: Extremities normal, atraumatic, no cyanosis or edema. Pulses: 2+ and symmetric all extremities.    Skin: Skin color, texture, turgor normal. No rashes or lesions   Neurologic: Grossly nonfocal       Lab Results   Component Value Date/Time    Sodium 122 (L) 07/24/2020 05:51 AM    Potassium 4.3 07/24/2020 05:51 AM    Chloride 89 (L) 07/24/2020 05:51 AM    CO2 25 07/24/2020 05:51 AM    BUN 27 (H) 07/24/2020 05:51 AM Creatinine 2.45 (H) 07/24/2020 05:51 AM    Glucose 73 07/24/2020 05:51 AM    Calcium 8.9 07/24/2020 05:51 AM    Magnesium 1.8 06/10/2020 12:00 AM    Phosphorus 2.5 (L) 01/23/2017 12:09 PM    Lactic acid 1.8 07/22/2020 10:59 AM       Lab Results   Component Value Date/Time    WBC 7.9 07/24/2020 05:51 AM    HGB 13.6 07/24/2020 05:51 AM    PLATELET 142 18/46/3980 05:51 AM    MCV 80.8 07/24/2020 05:51 AM       Lab Results   Component Value Date/Time    INR 1.3 (H) 02/22/2017 03:16 AM    aPTT 28.2 02/15/2017 04:24 AM    Alk. phosphatase 141 (H) 07/22/2020 09:57 AM    Protein, total 7.8 07/22/2020 09:57 AM    Albumin 3.2 (L) 07/22/2020 09:57 AM    Globulin 4.6 (H) 07/22/2020 09:57 AM       Lab Results   Component Value Date/Time    Iron 69 04/29/2019 11:56 AM    TIBC 246 (L) 04/29/2019 11:56 AM    Iron % saturation 28 04/29/2019 11:56 AM       Lab Results   Component Value Date/Time    TSH 1.250 06/23/2020 12:00 AM        No results found for: PH, PHI, PCO2, PCO2I, PO2, PO2I, HCO3, HCO3I, FIO2, FIO2I    Lab Results   Component Value Date/Time    CK 51 01/18/2017 11:11 PM    CK-MB Index 5.7 (H) 01/18/2017 11:11 PM    Troponin-I, Qt. <0.05 07/22/2020 10:59 AM    BNP 1,209 (H) 01/21/2017 12:25 PM        Lab Results   Component Value Date/Time    Culture result: PENDING 07/23/2020 03:30 PM    Culture result: NO GROWTH 2 DAYS 07/22/2020 10:31 AM    Culture result: MRSA NOT PRESENT 02/14/2020 11:45 AM    Culture result:  02/14/2020 11:45 AM         Screening of patient nares for MRSA is for surveillance purposes and, if positive, to facilitate isolation considerations in high risk settings. It is not intended for automatic decolonization interventions per se as regimens are not sufficiently effective to warrant routine use.        Lab Results   Component Value Date/Time    Hepatitis B surface Ag 0.25 02/14/2017 04:29 PM       Lab Results   Component Value Date/Time    Vancomycin,trough 20.2 (HH) 01/24/2017 03:56 AM    CK 51 01/18/2017 11:11 PM       Lab Results   Component Value Date/Time    Color YELLOW/STRAW 07/23/2020 07:10 AM    Appearance CLEAR 07/23/2020 07:10 AM    Specific gravity 1.025 07/23/2020 07:10 AM    pH (UA) 5.5 07/23/2020 07:10 AM    Protein >300 (A) 07/23/2020 07:10 AM    Glucose Negative 07/23/2020 07:10 AM    Ketone Negative 07/23/2020 07:10 AM    Bilirubin NEGATIVE  02/14/2020 12:04 PM    Blood TRACE (A) 07/23/2020 07:10 AM    Urobilinogen 0.2 07/23/2020 07:10 AM    Nitrites Negative 07/23/2020 07:10 AM    Leukocyte Esterase Negative 07/23/2020 07:10 AM    WBC 0-4 07/23/2020 07:10 AM    RBC 0-5 07/23/2020 07:10 AM    Bacteria Negative 07/23/2020 07:10 AM       Images: personally visualized and report reviewed    CXR (7/23/2020):  Diminished right-sided pleural effusion. Persistent opacity in the midlung    IMPRESSION  · Pleural effusion - exudative. · Pulmonary mass  · Mets to the liver  · COPD, not in exacerbation  · Acute respiratory failure with hypoxia  · Tobacco use    PLAN  · Goal sats 88% or higher, wean O2 as able  · Will need exercise oximetry closer to discharge  · F/u pleural fluid cytology/cx  · Repeat CXR. If effusion reaccumulates quickly and proves to be malignant, would consider PleurX catheter. Discussed this with patient.   · Make nebs prn as he says they are not helpful, but if he notices a change in his breathing will reschedule  · Follow-up liver biopsy  · Brain MRI planned for today  · Would benefit from outpatient pulmonary follow-up      [place], [place]

## 2020-07-24 NOTE — PROGRESS NOTES
Saw Mr. Yeboah Query wife in the hallway of 4 post surg. She shared Spud, her  has decided not to have any treatment and he is hopeful to be able to go home. She share a bit of her thoughts and feelings. She also shared she will continue to support his decisions. Provided listening presence and assurance of prayers.   Visited by: Keshia Turcios, MS., [facility] ([id number])

## 2020-07-24 NOTE — PROGRESS NOTES
[facility] RESIDENCY PROGRAM  PROGRESS NOTE     7/24/2020  PCP: Danne Babinski, MD     Assessment/Plan:     Zoie Issa is a 81 y.o. male with PMHx of CHF, CAD, paroxismal a-fib COPD, T2DM, CKD3, and hx of GIB who is admitted for dyspnea and CHF exacerbation. 24-hour Events: No acute events overnight. S/p thoracentesis yesterday. He received 18mg of PRN morphine yesterday.     Dyspnea 2/2 likely malignant pleural effusion in the setting of COPD: CT chest w/ findings consistent w/ lung neoplasm w/ widespread metastatic disease, small R-sided pleural effusion. Ct A/P w/ numerous hepatic metastases and distended gallbladder/CBD. Bone scan w/o evidence of bony metastases. Pt underwent liver biopsy 7/22, awaiting pathology results. Lower suspicion for infectious process, however procalcitonin is elevated at 0.95. BCx NGTD. -consult to oncology, appreciate recs       -pending MRI brain - pt's ICD is compatible w/ MRI  -consult to palliative care - appreciate recs       -morphine IR 15mg PO Q6H and morphine 2 mg IV q2h PRN       -miralax PRN       -palliative will continue follow and adjust pain regimen as needed  - consult to pulmonary - appreciate recs       - s/p R-sided thoroscentesis w/ resolution of pleural effusion on xray       - exudative pleural fluid, likely 2/2 malignany        - atrovent Q4H PRN; dc brovana & pulmicort       -O2 via nasal canula PRN to maintain O2 sats >88%  -continue Levaquin to cover for possible PNA given elevated procal and WBC w/ left shift in the setting of pleural effusion and end-stage COPD     Systolic heart failure w/ reduced ejection fraction (20-25%), NYHA Class II: Pt has ICD. POA BNP 10,129. Echo (6/15/20) w/ LVEF 20-25%. Pt follows at [place] [id number] (Dr. Chivo Anguiano).  Per chart, patient had not been taking his prescribed medications in the 2 weeks prior to admission.   -consult to cardiology, appreciate recs        -torsemide 20 mg        -pt's ICD MRI-compatible  -strict Is/Os  -daily weights  -continue home entresto 49-51mg  -continue home metoprolol 25mg daily     CHACORTA on CKD3: Creatinine 1.65>2. 45. Elevated urine protein. -pt restarted on torsemide, also s/p CT abd/chest/pelvis w/ contrast  -Q12 BMP  -continue home KCl 20meq tab BID  -per nephro will get quantitative urine protein - appreciate recs     Hyponatremia likely 2/2 SIADH: POA Na 124, stable. FENa of 0.1%, prerenal.  -per nephrology continue torsemide for now - appreciate recs  -trend BMP Q12H     CAD:  -continue home atorvastatin 40mg daily  -hold home ASA 81mg     T2DM: A1C 6/10/20 12.6. Per med rec pt takes NPH/regular 70/30 10u QAM, 8U QPM.  -Holding home insulin 70/30  -SSI Q6H, Lantus 32 Units QHS (weight-based)  -Q6H glucose checks.  -Hypoglycemia protocols ordered. -Goal glucose concentration >70, <140 pre-meal and <180 with all random glucoses. Tobacco Use Disorder: Pt has 100 pack year smoking history. -current 2ppd user  -nicotine patch while in the hospital     FEN/GI - Cardiac diet   DVT prophylaxis - fondaparinux - pt has hx of HIT  GI prophylaxis - Not indicated at this time  Code Status - Partial. Pt is DNI. Discussed with patient / caregivers. Next of Kin Name and [address],  Spouse - [phone]    Fernanda Curry. discussed with Dr. Michael Cade. Subjective:   Pt was seen and examined at bedside. Afebrile and hemodynamically stable. Concerns overnight include: wheezing, one episode of vomiting w/ scant blood. Pain well controlled on current regimen. Denies fever, chills, nausea, SOB, chest pain.      Objective:   Physical examination  Patient Vitals for the past 24 hrs:   Temp Pulse Resp BP SpO2   07/24/20 0744 98.5 °F (36.9 °C) 88 16 115/76 90 %   07/24/20 0311 98.2 °F (36.8 °C) 88 16 105/57 91 %   07/23/20 2343 98 °F (36.7 °C) 92 16 111/66 92 %   07/23/20 2300  93      07/23/20 1933 97.9 °F (36.6 °C) 97 20 100/65 91 % 20 1502 98 °F (36.7 °C) (!) 102 23 113/64 90 %   20 1500  (!) 103      20 1120  (!) 101 19 118/74 90 %      Temp (24hrs), Av.1 °F (36.7 °C), Min:97.9 °F (36.6 °C), Max:98.5 °F (36.9 °C)     O2 Flow Rate (L/min): 2 l/min   O2 Device: Nasal cannula    Date 20 - 20 - 20 0659   Shift 1695-5936 1968-6885 24 Hour Total 9570-6376 3611-8398 24 Hour Total   INTAKE   Shift Total(mL/kg)         OUTPUT   Urine(mL/kg/hr) 200(0.2)  200(0.1)        Urine Voided 200  200      Shift Total(mL/kg) 200(2.4)  200(2.4)      NET -200  -200      Weight (kg) 83.8 83.8 83.8 83.8 83.8 83.8     General:   Alert, cooperative, no acute distress, sitting upright in bedside chair    Head:   Atraumatic   Eyes:   Conjunctivae clear   ENT:  Oral mucosa normal   Lungs:   Wheezing noted in all lung fields, good air movement throughout. Heart:   Normal rate, regular rhythm, no murmur   Abdomen:    Normoactive bowel sounds. Moderate abdominal distention, no tenderness, rebound, or guarding    Extremities:  No edema. R shoulder non-tender to palpation    Pulses:  Symmetric all extremities   Skin:  Warm and dry    No rashes or lesions   Neurologic:  Alert and oriented x4   No focal deficits     Data Review:     CBC:  Recent Labs     20  0551 20  0512 20  0957   WBC 7.9 7.7 7.6   HGB 13.6 13.7 16.0   HCT 40.4 40.6 46.6    693 944     Metabolic Panel:  Recent Labs     20  0551 20  1545 20  0512  20  0957   * 124* 125*   < > 124*   K 4.3 4.3 3.9   < > 3.9   CL 89* 90* 92*   < > 90*   CO2 25 24 25   < > 23   BUN 27* 24* 24*   < > 20   CREA 2.45* 1.65* 1.59*   < > 1.35*   GLU 73 99 105*   < > 158*   CA 8.9 8.4* 8.6   < > 9.0   ALB  --   --   --   --  3.2*   TBILI  --   --   --   --  0.5   ALT  --   --   --   --  31    < > = values in this interval not displayed.      Micro:  Lab Results   Component Value Date/Time    Culture result: PENDING 07/23/2020 03:30 PM    Culture result: NO GROWTH 2 DAYS 07/22/2020 10:31 AM    Culture result: MRSA NOT PRESENT 02/14/2020 11:45 AM    Culture result:  02/14/2020 11:45 AM         Screening of patient nares for MRSA is for surveillance purposes and, if positive, to facilitate isolation considerations in high risk settings. It is not intended for automatic decolonization interventions per se as regimens are not sufficiently effective to warrant routine use. Imaging:  Cta Chest W Or W Wo Cont    Result Date: 7/22/2020  Clinical history: r/o pe INDICATION:   r/o pe COMPARISON: 1/19/2017 TECHNIQUE: CT of the chest with  IV contrast , Isovue-370 is performed. Axial images from the thoracic inlet to the level of the upper abdomen are obtained. Manual post-processing of the images and coronal reformatting is also performed. CT dose reduction was achieved through use of a standardized protocol tailored for this examination and automatic exposure control for dose modulation. Multiplanar reformatted imaging was performed. Sagittal and coronal reformatting. 3-D Postprocessing of imaging was performed. 3-D MIP reconstructed images were obtained in the coronal plane. FINDINGS: There is a large right upper lobe mass lesion. Extensive mediastinal lymphadenopathy. Numerous hepatic hypodensities consistent with metastatic disease. Mesenteric and retroperitoneal lymphadenopathy as well. There is a moderate to large right-sided pleural effusion. There is significant attenuation of pulmonary arterial vessels extending to the right upper lobe. Cardiac pacer in place. Left basilar atelectasis. Cardiomegaly is mild. . The aorta is normal in course and caliber. Hydropic gallbladder. IMPRESSION: Imaging findings consistent with lung neoplasm with widespread metastatic disease. Hilar and mediastinal lymphadenopathy. Hepatic metastatic metastases. Retroperitoneal and mesenteric lymphadenopathy as well.  There is significant attenuation of pulmonary arterial vessels extending to the right upper lobe without pulmonary embolism identified. Small possibly malignant right-sided pleural effusion. Hepatic mass lesions are amenable to percutaneous sampling. The findings were called to Stefano Middleton on 7/22/2020 at 10:50 AM by Dr. Odilia Cottrell. [facility]    Result Date: 7/22/2020  CLINICAL HISTORY: Liver mass INDICATION: Liver mass FINDINGS: The patient was informed of the risks and benefits of the procedure. Risks including the risk of bleeding and the need for possible post biopsy embolization were discussed with the patient. After obtaining informed consent, the patient was brought to the ultrasonography suite and placed in the supine position. Ultrasonography through the abdomen was obtained so that a liver biopsy could be performed. The skin overlying the region of the mass was sterilely prepped and draped. One percent lidocaine was then injected into the subcutaneous tissues overlying the lesion. Next, a 18-gauge Temno core biopsy needle was advanced into the lesion utilizing intermittent ultrasonographic guidance. The patient was hemodynamically stable after the completion of the procedure and did not report any significant symptom. PROCEDURE:Successful ultrasound-guided liver mass biopsy : Kvng Cottrell MD. PROCEDURE: Ultrasound-guided liver mass biopsy PREPROCEDURAL DIAGNOSIS: Liver lesion POSTPROCEDURAL DIAGNOSIS: Liver lesion ESTIMATED BLOOD LOSS: None. SPECIMENS REMOVED: 6, 18-gauge Core biopsy specimens COMPLICATIONS: None. IMPRESSION: Successful ultrasound-guided liver mass biopsy preliminary results consistent with neoplasm. Please see detailed pathology report for further information. Xr Chest Port    Result Date: 7/22/2020  EXAM: XR CHEST PORT INDICATION: Shortness of breath COMPARISON: 11/14/2019 FINDINGS: A portable AP radiograph of the chest was obtained at 1009 hours.  The patient is on a cardiac monitor. There is a pacemaker in place without lead fracture. Asymmetric right-sided airspace disease is noted with a small right pleural effusion. The lungs are hyperinflated. IMPRESSION: New asymmetric right-sided airspace disease with right pleural effusion    Medications reviewed  Current Facility-Administered Medications   Medication Dose Route Frequency    levoFLOXacin (LEVAQUIN) 250 mg in D5W IVPB  250 mg IntraVENous Q24H    torsemide (DEMADEX) tablet 10 mg  10 mg Oral PRN    ipratropium (ATROVENT) 0.02 % nebulizer solution 0.5 mg  0.5 mg Nebulization Q4H PRN    morphine IR (MS IR) tablet 15 mg  15 mg Oral Q6H    promethazine (PHENERGAN) tablet 25 mg  25 mg Oral Q6H PRN    sodium chloride (NS) flush 5-40 mL  5-40 mL IntraVENous Q8H    sodium chloride (NS) flush 5-40 mL  5-40 mL IntraVENous PRN    polyethylene glycol (MIRALAX) packet 17 g  17 g Oral DAILY PRN    nicotine (NICODERM CQ) 21 mg/24 hr patch 1 Patch  1 Patch TransDERmal DAILY    fondaparinux (ARIXTRA) injection 2.5 mg  2.5 mg SubCUTAneous Q24H    sacubitriL-valsartan (ENTRESTO) 49-51 mg tablet 1 Tab  1 Tab Oral BID    metoprolol succinate (TOPROL-XL) XL tablet 25 mg  25 mg Oral DAILY    potassium chloride SR (KLOR-CON 10) tablet 20 mEq  20 mEq Oral BID    atorvastatin (LIPITOR) tablet 40 mg  40 mg Oral DAILY    ergocalciferol capsule 50,000 Units  50,000 Units Oral Q7D    glucose chewable tablet 16 g  4 Tab Oral PRN    dextrose (D50W) injection syrg 12.5-25 g  25-50 mL IntraVENous PRN    glucagon (GLUCAGEN) injection 1 mg  1 mg IntraMUSCular PRN    insulin glargine (LANTUS) injection 32 Units  0.4 Units/kg SubCUTAneous QHS    insulin lispro (HUMALOG) injection   SubCUTAneous Q6H    morphine injection 2 mg  2 mg IntraVENous Q2H PRN         Signed:   Leena Shafer DO  Family Medicine Resident      Attending note:    Attending note to follow

## 2020-07-24 NOTE — PROGRESS NOTES
Patient told this RN that around 0230 he had a \"fit of regurgitation\" and he saw a \"spot of something pink\". Pt thought is was blood at first but he also noted it was not bright or dark red just pink.     0622:  This RN called family medicine and made them aware

## 2020-07-25 NOTE — ROUTINE PROCESS
I have reviewed discharge instructions with the patient. The patient verbalized understanding. [facility] called to inform of discharge.

## 2020-07-25 NOTE — PROGRESS NOTES
7/25/2020 
9:33 AM 
 
CM called [facility] to confirm admission this date. Liaison- Kathe Guzman confirmed pt will be admitted after 2pm today. Family will provide transportation. Nursing notified.  
  
Transitions of Care Plan: 1. Home with hospice through [facility] on 7/25 after 2PM  
2. Family will transport home 3. Pt is a VBP and has a RUR score of 11% 
  
Nursing on day of discharge please call [facility] to notify of discharge [phone]. 
  
Care Management Interventions Mode of Transport at Discharge: Self Transition of Care Consult (CM Consult): Home Hospice Current Support Network: Lives with Spouse Confirm Follow Up Transport: Family The Plan for Transition of Care is Related to the Following Treatment Goals : hospice The Patient and/or Patient Representative was Provided with a Choice of Provider and Agrees with the Discharge Plan?: Yes Freedom of Choice List was Provided with Basic Dialogue that Supports the Patient's Individualized Plan of Care/Goals, Treatment Preferences and Shares the Quality Data Associated with the Providers?: Yes Discharge Location Discharge Placement: Home with hospice Jose Membreno

## 2020-07-25 NOTE — PROGRESS NOTES
Bedside and Verbal shift change report given to Adeline (oncoming nurse) by ERICK Gresham RN (offgoing nurse). Report given with SBAR, Kardex, Intake/Output, MAR and Recent Results.

## 2020-07-25 NOTE — DISCHARGE SUMMARY
Jennifer Leo [address]  Office [phone] Fax [phone] Discharge / Transfer / Off-Service Note Name: Marilu Gonzales MRN: [id number]  Sex: Male YOB: 1950  Age: 81 y.o. PCP: Magdalena Aguirre MD  
 
Date of admission: 7/22/2020 Date of discharge/transfer: 7/25/2020 Attending physician at admission: Abdoulaye Mathur. Attending physician at discharge/transfer: No att. providers found Resident physician at discharge/transfer: Geovanna Roberts MD 
  
Consultants during hospitalization IP CONSULT TO ONCOLOGY 
IP CONSULT TO PALLIATIVE CARE - PROVIDER 
IP CONSULT TO NEPHROLOGY 
IP CONSULT TO CARDIOLOGY 
IP CONSULT TO PULMONOLOGY Admission diagnoses Shortness of breath [R06.02] Neoplasm of lung [D49.1] Recommended follow-up after discharge 1. PCP-Yousuf Diaz MD 
2. [facility] Things to follow up on with PCP:  
-cancer diagnosis 
-hospice care 
-end of life discussion 
 ---------------------------------------------------------------------------------------------------------------------------- The patient was well enough to be discharged from the hospital. However, because they were inpatient in a hospital, they are at greater risk of having been exposed to the coronavirus. PLEASE stay inside and self-quarantine for 14 days to prevent further spread of the coronavirus. 
------------------------------------------------------------------------------------------------------------------ Medication Changes: START: 
-pain regimen per hospice STOP: 
-aspirin 
-atorvastatin 
-entresto 
-ergocalciferol 
-metoprolol 
-insulin -KCl 
-torsemide CHANGES: 
-none No other changes were made to your medications, please take all your other home medicines as previously prescribed. History of Present Illness As per admitting provider, Dr. Atkinson:  
Marilu Cali. is a 81 y.o. male with PMHx of CHF, CAD, paroxismal a-fib COPD, T2DM, CKD3, and hx of GIB who presents to the ED complaining of increasing SOB and chest pain.   
  
The pt states that about a month ago he began to have back and R shoulder pain treated with Tylenol. The last 2 wks he has taken 1g Tylenol Q4H. Over the last month he also complains of worsening dyspnea on exertion with wheezing, night sweats, cough with gray sputum, diarrhea, decreased PO intake and abdominal 'soreness'. Pt also states he has had some weight loss this year but it was intentional, about 19lbs over about 2 months. His shoulder pain and other symptoms were initially controlled with tylenol however they progressed to the point where he made a virtual appointment with his PCP today and was sent to the ED due to concern for pneumonia.  
  
He currently denies any fevers, chills, lower extremity edema. The [facility]- Dr. Orozco Files Questions:  
Experiencing any of the following symptoms: fever, chills, cough, SOB, diarrhea, URI symptoms.  
Any Sick contacts with fever, cough, diarrhea, SOB, URI symptoms. No 
Traveled out of state or out of country. No 
Lives with Wife. Has been staying at home. Yes 
  In the ED: 
Vitals: Temp 98.7   /93      RR 20   SatO2  98% on RA Labs: CBC 7.6/16/210. Na 124, Cl 90, Cr 1.35 (Bl 2), BNP 10,129 Imaging: CTA chest- No PE. Lung neoplasm with widespread metastatic disease including hepatic mets and malignant R pleural effusion. Treatment: Morphine 4mg IV 
  
EKG: Sinus tach, QtC 500, incomplete LBBB Hospital course Deb Pickett Jr. is a 81 y. o. male with PMHx of CHF, CAD, paroxismal a-fib COPD, T2DM, CKD3, and hx of GIB who is admitted for dyspnea and CHF exacerbation. 
  
Dyspnea 2/2 likely malignant pleural effusion in the setting of COPD: CT chest w/ findings consistent w/ lung neoplasm w/ widespread metastatic disease, small R-sided pleural effusion. Ct A/P w/ numerous hepatic metastases and distended gallbladder/CBD. Bone scan w/o evidence of bony metastases. Pt underwent liver biopsy 7/22, awaiting pathology results. S/p thoroscentesis while in the hospital. 
-pt to discharge w/ [facility] hospice and follow with them for pain management and end of life care 
-pt declined brain MRI for staging 
-per pulmonology can consider a pleural catheter if R-sided pleural effusion re-accumulates 
-pt on levaquin while in the hospital d/c on discharge 
  
Systolic heart failure w/ reduced ejection fraction (20-25%), NYHA Class II: Pt has ICD. POA BNP 10,129. Echo (6/15/20) w/ LVEF 20-25%. Pt follows at [facility] [place] [facility] [place] (Dr. Ty Rios). Per chart, patient had not been taking his prescribed medications in the 2 weeks prior to admission. -AICD deactivated 
-d/c torsemide, entresto, metoprolol on discharge 
  
CHACORTA on CKD3: Creatinine 1.65>2.45>4. 17. Elevated urine protein. Pt restarted on torsemide, also s/p CT abd/chest/pelvis w/ contrast. 
-d/c home KCl and torsemide on discharge 
  
Hyponatremia likely 2/2 SIADH: POA Na 124, stable. FENa of 0.1%, prerenal. S/p torsemide x1. 
-hold torsemide on discharge 
  
CAD: 
-continue home atorvastatin 40mg daily - d/c on discharge 
-d/c home ASA and atorvastatin on discharge 
  
T2DM: A1C 6/10/20 12.6. Per med rec pt takes NPH/regular 70/30 10u QAM, 8U QPM. 
-Holding home insulin 70/30 - do not restart on d/c 
  
Tobacco Use Disorder: Pt has 100 pack year smoking history. -current 2ppd user 
-nicotine patch while in the hospital 
 
 
 
Physical exam at discharge: 
 
[facility]. Patient Vitals for the past 12 hrs: 
 Temp Pulse Resp BP SpO2  
07/25/20 1154 97.9 °F (36.6 °C) 96 16 115/73 92 %  
07/25/20 0836 97.9 °F (36.6 °C) (!) 102 16 105/70 90 % 07/25/20 0747  (!) 101    General:   Alert, cooperative, no acute distress, sitting upright in bedside chair Head:   Atraumatic Eyes:   Conjunctivae clear ENT:  Oral mucosa normal  
Lungs:   Wheezing noted in b/l upper lung fields R>L, good air movement throughout. Heart:   Normal rate, regular rhythm, no murmur Abdomen:    Normoactive bowel sounds. Moderate abdominal distention, no tenderness, rebound, or guarding Extremities:  No edema. R shoulder non-tender to palpation Pulses:  Symmetric all extremities Skin:  Warm and dry No rashes or lesions Neurologic:  Alert and oriented x4 No focal deficits Condition at discharge: Hospice Labs Recent Labs  
  07/25/20 
0453 07/24/20 
0551 07/23/20 
7836 WBC 10.5 7.9 7.7 HGB 13.5 13.6 13.7 HCT 40.3 40.4 40.6  189 202 Recent Labs  
  07/25/20 
0453 07/24/20 
0551 07/23/20 
1545 * 122* 124* K 4.9 4.3 4.3 CL 86* 89* 90* CO2 26 25 24 BUN 33* 27* 24* CREA 4.17* 2.45* 1.65* GLU 72 73 99  
CA 9.0 8.9 8.4* No results for input(s): ALT, AP, TBIL, TBILI, TP, ALB, GLOB, GGT, AML, LPSE in the last 72 hours. No lab exists for component: SGOT, GPT, AMYP, HLPSE Recent Labs  
  07/25/20 
1152 07/25/20 
0604 07/25/20 
0007 07/24/20 
2149 07/24/20 
1745 GLUCPOC 147* 111* 80 83 87 Micro: 
Lab Results Component Value Date/Time Culture result: NO GROWTH 2 DAYS 07/23/2020 03:30 PM  
 Culture result: NO GROWTH 3 DAYS 07/22/2020 10:31 AM  
 Culture result: MRSA NOT PRESENT 02/14/2020 11:45 AM  
 Culture result:  02/14/2020 11:45 AM  
      Screening of patient nares for MRSA is for surveillance purposes and, if positive, to facilitate isolation considerations in high risk settings. It is not intended for automatic decolonization interventions per se as regimens are not sufficiently effective to warrant routine use. Imaging: 
Nm Bone Scan Wh Body Result Date: 7/23/2020 EXAM: NM BONE SCAN WH BODY INDICATION: lung cancer staging, upper back pain COMPARISON: None IMAGING CORRELATION: CTA chest from July 22, 2020 TRACER: 26.3 mCi of Tc-99m MDP. TECHNIQUE: Imaging of the whole body was performed from the anterior and posterior projections following intravenous administration of  Tc-99m MDP and appropriate delay. Additionally, lateral views of the skull and cervical spine were obtained. FINDINGS: There is increased soft tissue uptake in the right perihilar region, corresponding to the large hilar mass seen on CT. No abnormal osseous tracer activity is evident. There is no evidence for fracture or metastatic disease. Incidental renal imaging shows no abnormality. IMPRESSION: No evidence of osseous metastatic disease. Right hilar mass is compatible with malignancy. Cta Chest W Or W Wo Cont Result Date: 7/22/2020 Clinical history: r/o pe INDICATION:   r/o pe COMPARISON: 1/19/2017 TECHNIQUE: CT of the chest with  IV contrast , Isovue-370 is performed. Axial images from the thoracic inlet to the level of the upper abdomen are obtained. Manual post-processing of the images and coronal reformatting is also performed. CT dose reduction was achieved through use of a standardized protocol tailored for this examination and automatic exposure control for dose modulation. Multiplanar reformatted imaging was performed. Sagittal and coronal reformatting. 3-D Postprocessing of imaging was performed. 3-D MIP reconstructed images were obtained in the coronal plane. FINDINGS: There is a large right upper lobe mass lesion. Extensive mediastinal lymphadenopathy. Numerous hepatic hypodensities consistent with metastatic disease. Mesenteric and retroperitoneal lymphadenopathy as well. There is a moderate to large right-sided pleural effusion. There is significant attenuation of pulmonary arterial vessels extending to the right upper lobe. Cardiac pacer in place. Left basilar atelectasis. Cardiomegaly is mild. . The aorta is normal in course and caliber. Hydropic gallbladder. IMPRESSION: Imaging findings consistent with lung neoplasm with widespread metastatic disease. Hilar and mediastinal lymphadenopathy. Hepatic metastatic metastases. Retroperitoneal and mesenteric lymphadenopathy as well. There is significant attenuation of pulmonary arterial vessels extending to the right upper lobe without pulmonary embolism identified. Small possibly malignant right-sided pleural effusion. Hepatic mass lesions are amenable to percutaneous sampling. The findings were called to Norm Zelaya on 7/22/2020 at 10:50 AM by Dr. Yani Dunbar. [address] Ct Abd Pelv W Cont Result Date: 7/23/2020 EXAM: CT ABD PELV W CONT INDICATION: staging scans for new dx lung mass COMPARISON: Chest CT dated 6/22/2020 CONTRAST: 100 mL of Isovue-370. TECHNIQUE: Following the uneventful intravenous administration of contrast, thin axial images were obtained through the abdomen and pelvis. Coronal and sagittal reconstructions were generated. Oral contrast was administered. CT dose reduction was achieved through use of a standardized protocol tailored for this examination and automatic exposure control for dose modulation. FINDINGS: LOWER THORAX: There is moderate right pleural effusion. Pacer wires in the heart LIVER: Diffuse liver metastases too numerous to count BILIARY TREE: Gallbladder is distended. Small amount of pericholecystic fluid. The common bile duct is dilated measuring 13 mm above the pancreatic head and 10 mm within the pancreatic head. CBD is not dilated. SPLEEN: within normal limits. PANCREAS: Mass effect in the pancreas with adjacent enlarged intracranial lymph node. ADRENALS: Unremarkable. KIDNEYS: Hypodensities in the left kidney with Hounsfield density slightly greater than expected for simple cyst. STOMACH: Unremarkable SMALL BOWEL: Unremarkable COLON: Colonic diverticulosis in the sigmoid colon.  APPENDIX: Not visualized PERITONEUM: No ascites or pneumoperitoneum. RETROPERITONEUM: No lymphadenopathy or aortic aneurysm. Atherosclerotic disease noted large retroperitoneal lymph nodes in the upper abdomen. Largest measures approximately 4.5 x 2.6 cm REPRODUCTIVE ORGANS: Unremarkable URINARY BLADDER: No mass or calculus. BONES: No destructive bone lesion. ABDOMINAL WALL: No mass or hernia. ADDITIONAL COMMENTS: N/A IMPRESSION: 1. Too numerous to count hepatic metastases. 2.  Retroperitoneal adenopathy in the upper abdomen. 3.  Distended gallbladder. Dilated common bile duct. The etiology is unclear but may be secondary to parapancreatic adenopathy. Recommend correlation with serum alkaline phosphatase and signs and symptoms of cholecystitis. 4.  Moderate right pleural effusion. Us Guide Bx Keeley Perc Result Date: 7/22/2020 CLINICAL HISTORY: Liver mass INDICATION: Liver mass FINDINGS: The patient was informed of the risks and benefits of the procedure. Risks including the risk of bleeding and the need for possible post biopsy embolization were discussed with the patient. After obtaining informed consent, the patient was brought to the ultrasonography suite and placed in the supine position. Ultrasonography through the abdomen was obtained so that a liver biopsy could be performed. The skin overlying the region of the mass was sterilely prepped and draped. One percent lidocaine was then injected into the subcutaneous tissues overlying the lesion. Next, a 18-gauge Temno core biopsy needle was advanced into the lesion utilizing intermittent ultrasonographic guidance. The patient was hemodynamically stable after the completion of the procedure and did not report any significant symptom. PROCEDURE:Successful ultrasound-guided liver mass biopsy : Kvng Haines MD. PROCEDURE: Ultrasound-guided liver mass biopsy PREPROCEDURAL DIAGNOSIS: Liver lesion POSTPROCEDURAL DIAGNOSIS: Liver lesion ESTIMATED BLOOD LOSS: None. SPECIMENS REMOVED: 6, 18-gauge Core biopsy specimens COMPLICATIONS: None. IMPRESSION: Successful ultrasound-guided liver mass biopsy preliminary results consistent with neoplasm. Please see detailed pathology report for further information. Xr Chest [facility] Result Date: 7/24/2020 EXAM: XR CHEST PORT INDICATION: Reevaluate pleural effusion; increased shortness of breath COMPARISON: 7/23/2020 FINDINGS: A portable AP radiograph of the chest was obtained at 1539 hours. The patient is on a cardiac monitor. There is a persistent right perihilar lung airspace process. The pacemaker obscures part of this process. The right hilum remains prominent. The left lung is clear. IMPRESSION: No significant right pleural effusion identified. Persistent right hilar prominence and right perihilar airspace disease Xr Chest [facility] Result Date: 7/23/2020 INDICATION: Post procedure. s/p right sided thoracentesis Additional history: COMPARISON: Previous chest xray, yesterday. LIMITATIONS: Portable technique. Emili Bean FINDINGS: Single frontal view of the chest. . Lines/tubes/surgical: Cardiac assist device in the right chest appears unchanged. Heart/mediastinum: Calcifications in the aortic arch. Lungs/pleura: Diminished right-sided pleural effusion without visible pneumothorax. Right midlung opacity in continuity with the mediastinum. Chronic appearing lung changes. Additional Comments: None. Emili Bean IMPRESSION: 1. Diminished right-sided pleural effusion. 2. Persistent opacity in the midlung. Xr Chest [facility] Result Date: 7/22/2020 EXAM: XR CHEST PORT INDICATION: Shortness of breath COMPARISON: 11/14/2019 FINDINGS: A portable AP radiograph of the chest was obtained at 1009 hours. The patient is on a cardiac monitor. There is a pacemaker in place without lead fracture. Asymmetric right-sided airspace disease is noted with a small right pleural effusion. The lungs are hyperinflated. IMPRESSION: New asymmetric right-sided airspace disease with right pleural effusion Procedures / Diagnostic Studies · Liver biopsy · CT chest/abdomen · Bone scan Chronic diagnoses Problem List as of 7/25/2020 Date Reviewed: 7/22/2020 Codes Class Noted - Resolved PAD (peripheral artery disease) (HCC) ICD-10-CM: I73.9 ICD-9-CM: 443.9  12/16/2019 - Present Presence of Watchman left atrial appendage closure device ICD-10-CM: Z95.818 ICD-9-CM: V45.09  11/21/2019 - Present * (Principal) Shortness of breath ICD-10-CM: R06.02 
ICD-9-CM: 786.05  7/22/2020 - Present Neoplasm of lung ICD-10-CM: D49.1 ICD-9-CM: 239.1  7/22/2020 - Present Liver metastases (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 
ICD-9-CM: V15.81  9/11/2018 - Present Proteinuria ICD-10-CM: R80.9 ICD-9-CM: 791.0  6/6/2018 - Present Polyp of colon ICD-10-CM: K63.5 ICD-9-CM: 211.3  3/5/2018 - Present Advance care planning ICD-10-CM: Z71.89 ICD-9-CM: V65.49  3/5/2018 - Present  
   
 ICD (implantable cardioverter-defibrillator) in place ICD-10-CM: Z95.810 ICD-9-CM: V45.02  12/4/2017 - Present Peripheral vascular disease ([facility] ICD-10-CM: I73.9 ICD-9-CM: 443.9  9/18/2017 - Present Overview Signed 9/18/2017  3:05 PM by MD SILVER Simpson.  RICKY (3/20/17): Right 0.58, Left 0.56. Coronary artery disease involving native heart without angina pectoris ICD-10-CM: I25.10 ICD-9-CM: 414.01  8/17/2017 - Present Paroxysmal atrial fibrillation (HCC) ICD-10-CM: I48.0 ICD-9-CM: 427.31  3/6/2017 - Present H/O: GI bleed ICD-10-CM: Z87.19 ICD-9-CM: V12.79  3/6/2017 - Present History of hepatitis C ICD-10-CM: Z86.19 ICD-9-CM: V12.09  3/6/2017 - Present Dyslipidemia ICD-10-CM: E78.5 ICD-9-CM: 272.4  3/6/2017 - Present Overview Signed 3/6/2017  1:47 PM by MD SILVER Simpson. FLP (1/19/17): Tot 120, , HDL 19, LDL 76 (no Rx). Ischemic cardiomyopathy ICD-10-CM: I25.5 ICD-9-CM: 414.8  1/20/2017 - Present Overview Addendum 9/18/2017  2:24 PM by MD SILVER Simpson. Echo (1/19/17):  EF 5-10% with severe GHK,. Mildly dil LA. Mild TR. PASP 46. B. Cath (1/20/17):  LM ost70. LAD m50. D1 80. LCx d70; OM1 99, OM2 90. RCA p100. No AVG. PAP  53/27/36. C.  PCI (2/9/17): pRCA 2.5x38 Xience + 2.75x12 Xience. ostLM 4.0x12 Xience post-dil with 4.5x12 NC. D.  Echo (2/13/17):  EF 10% with severe GHK, PSM. Dil LA. Mod MR. Mild to mod TR. Left pleural effusion. E.  Echo (5/15/17): EF 25% with severe GHK and basl-mid inf AK, mildly dil LV, DD. Sev dil LA. Mod MR. Mild TR. PASP 35. F.  ICD (6/12/17, Anup): [employer]. RESOLVED: Vitamin deficiency ICD-10-CM: E56.9 ICD-9-CM: 269.2  1/29/2019 - 7/22/2020 RESOLVED: Hypokalemia ICD-10-CM: E87.6 ICD-9-CM: 276.8  1/11/2018 - 7/22/2020 RESOLVED: ICD (implantable cardioverter-defibrillator), single, in situ ICD-10-CM: Z95.810 ICD-9-CM: V45.02  7/12/2017 - 9/18/2017 RESOLVED: Diabetes mellitus [facility] ICD-10-CM: E11.9 ICD-9-CM: 250.00  3/6/2017 - 8/17/2017 RESOLVED: Systolic heart failure (HCC) ICD-10-CM: I50.20 ICD-9-CM: 428.20  1/20/2017 - 3/6/2017 RESOLVED: Acute systolic (congestive) heart failure (HCC) ICD-10-CM: I50.21 ICD-9-CM: 428.21, 428.0  1/19/2017 - 3/6/2017 RESOLVED: Bilateral lower extremity edema ICD-10-CM: R60.0 ICD-9-CM: 782.3  1/19/2017 - 3/6/2017 RESOLVED: Acute renal failure (ARF) (HCC) ICD-10-CM: N17.9 ICD-9-CM: 584.9  1/19/2017 - 3/6/2017 RESOLVED: Hyperglycemia due to type 2 diabetes mellitus ([email or web address] ICD-10-CM: E11.65 ICD-9-CM: 250.00  1/19/2017 - 3/6/2017 RESOLVED: Venous stasis dermatitis of both lower extremities ICD-10-CM: I87.2 ICD-9-CM: 454.1  1/19/2017 - 3/6/2017 RESOLVED: Hypoxia ICD-10-CM: R09.02 
ICD-9-CM: 799.02  1/19/2017 - 3/6/2017 RESOLVED: Pulmonary edema ICD-10-CM: J81.1 ICD-9-CM: 795  1/19/2017 - 3/6/2017 RESOLVED: Sinus tachycardia ICD-10-CM: R00.0 ICD-9-CM: 427.89  1/19/2017 - 3/6/2017 Discharge Medication List as of 7/25/2020 12:43 PM  
  
START taking these medications Details  
polyethylene glycol (MIRALAX) 17 gram packet Take 1 Packet by mouth daily as needed for Constipation for up to 15 days. , Normal, Disp-15 Packet,R-0  
  
  
CONTINUE these medications which have NOT CHANGED Details buPROPion SR (WELLBUTRIN SR) 150 mg SR tablet Take 1 Tab by mouth two (2) times a day., Normal, Disp-60 Tab,R-5  
  
umeclidinium-vilanteroL (Anoro Ellipta) 62.5-25 mcg/actuation inhaler Take 1 Puff by inhalation daily. , Normal, Disp-1 Inhaler,R-5 STOP taking these medications  
  
 insulin NPH/insulin regular (NovoLIN 70/30 U-100 Insulin) 100 unit/mL (70-30) injection Comments:  
Reason for Stopping:   
   
 insulin NPH/insulin regular (NovoLIN 70/30 U-100 Insulin) 100 unit/mL (70-30) injection Comments:  
Reason for Stopping:   
   
 torsemide (DEMADEX) 20 mg tablet Comments:  
Reason for Stopping:   
   
 ergocalciferol (ERGOCALCIFEROL) 1,250 mcg (50,000 unit) capsule Comments:  
Reason for Stopping: metoprolol succinate (TOPROL-XL) 50 mg XL tablet Comments:  
Reason for Stopping:   
   
 sacubitriL-valsartan (Entresto) 49-51 mg tab tablet Comments:  
Reason for Stopping:   
   
 potassium chloride (K-DUR, KLOR-CON) 20 mEq tablet Comments:  
Reason for Stopping:   
   
 atorvastatin (LIPITOR) 40 mg tablet Comments:  
Reason for Stopping:   
   
 aspirin 81 mg chewable tablet Comments:  
Reason for Stopping:   
   
  
 
  
Diet:  Regular diet. Activity:  As tolerated Disposition: Home Hospice Discharge instructions to patient/family Please seek medical attention for any new or worsening symptoms particularly fever, chest pain, shortness of breath, abdominal pain, nausea, vomiting Follow up plans/appointments Follow-up Information Follow up With Specialties Details Why Contact Info [address] [place] [address] [address] [place] [address] [phone] Rafa Dorsey DO Family Medicine On 7/29/2020 Your virtual follow-up appointment will be with Dr. Wm Daniels on Wednesday, July 29th at [address] [facility] [address] [address] [facility] [address] [phone] Lizz Morgan MD [address] [facility] [address] [id number] [phone] Hernandez Howell MD 
Family Medicine Resident For Billing Chief Complaint Patient presents with  Shortness of Breath Hospital Problems  Date Reviewed: 7/22/2020 Codes Class Noted POA  
 PAD (peripheral artery disease) ([facility] ICD-10-CM: I73.9 ICD-9-CM: 443.9  12/16/2019 Yes Presence of Watchman left atrial appendage closure device ICD-10-CM: Z95.818 ICD-9-CM: V45.09  11/21/2019 Yes * (Principal) Shortness of breath ICD-10-CM: R06.02 
ICD-9-CM: 786.05  7/22/2020 Unknown Neoplasm of lung ICD-10-CM: D49.1 ICD-9-CM: 239.1  7/22/2020 Unknown Liver metastases ([facility] ICD-10-CM: C78.7 ICD-9-CM: 197.7  7/22/2020 Unknown CKD (chronic kidney disease) stage 3, GFR 30-59 ml/min ([facility]) ICD-10-CM: N18.3 ICD-9-CM: 585.3  11/26/2019 Yes Chronic systolic congestive heart failure, NYHA class 3 ([facility]) ICD-10-CM: I50.22 ICD-9-CM: 428.22, 428.0  1/8/2019 Yes Type 2 diabetes mellitus with diabetic neuropathy ([facility]) ICD-10-CM: E11.40 ICD-9-CM: 250.60, 357.2  12/6/2018 Yes Tobacco dependence syndrome ICD-10-CM: F17.200 ICD-9-CM: 305.1  10/9/2018 Yes  
   
 ICD (implantable cardioverter-defibrillator) in place ICD-10-CM: Z95.810 ICD-9-CM: V45.02  12/4/2017 Yes Peripheral vascular disease ([facility] ICD-10-CM: I73.9 ICD-9-CM: 443.9  9/18/2017 Yes Overview Signed 9/18/2017  3:05 PM by MD JOSSIE Coffey  RICKY (3/20/17): Right 0.58, Left 0.56. Coronary artery disease involving native heart without angina pectoris ICD-10-CM: I25.10 ICD-9-CM: 414.01  8/17/2017 Yes Paroxysmal atrial fibrillation (HCC) ICD-10-CM: I48.0 ICD-9-CM: 427.31  3/6/2017 Yes  
   
 H/O: GI bleed ICD-10-CM: Z87.19 ICD-9-CM: V12.79  3/6/2017 Yes Dyslipidemia ICD-10-CM: E78.5 ICD-9-CM: 272.4  3/6/2017 Yes Overview Signed 3/6/2017  1:47 PM by MD JOSSIE Coffey FLP (1/19/17): Tot 120, , HDL 19, LDL 76 (no Rx). Ischemic cardiomyopathy ICD-10-CM: I25.5 ICD-9-CM: 414.8  1/20/2017 Yes Overview Addendum 9/18/2017  2:24 PM by MD JOSSIE Coffey Echo (1/19/17):  EF 5-10% with severe GHK,. Mildly dil LA. Mild TR. PASP 46. B. Cath (1/20/17):  LM ost70. LAD m50. D1 80. LCx d70; OM1 99, OM2 90. RCA p100. No AVG. PAP  53/27/36. C.  PCI (2/9/17): pRCA 2.5x38 Xience + 2.75x12 Xience. ostLM 4.0x12 Xience post-dil with 4.5x12 NC. D.  Echo (2/13/17):  EF 10% with severe GHK, PSM. Dil LA. Mod MR. Mild to mod TR. Left pleural effusion. E.  Echo (5/15/17): EF 25% with severe GHK and basl-mid inf AK, mildly dil LV, DD. Sev dil LA. Mod MR. Mild TR. PASP 35. F.  ICD (6/12/17, Anup): [employer].

## 2020-07-25 NOTE — PROGRESS NOTES
Palliative Medicine Consult  Har[place]et: [phone] ([phone])    Patient Name: Nicolasa Morrow. YOB: 1950    Date of Initial Consult: 7/23/2020  Reason for Consult: Goals of care discussion  Requesting Provider: Dr. [facility]  Primary Care Physician: Ashley Luo MD     SUMMARY:   Nicolasa Treviño is a 81 y.o. with a past history of ischemic cardiomyopathy, CHF, EF 20-25%, A. fib, advanced PAD, CAD, AICD, COPD, + active tobacco use, remote history of polysubstance abuse, ho was admitted on 7/22/2020 from PCPs office with a diagnosis of pleural effusion and new lung mass. Current medical issues leading to Palliative Medicine involvement include: Goals of care discussion and assistance with pain management in a patient who is newly diagnosed with what is likely widely metastatic lung cancer. Patient presented to the ER with CC of worsening chest and back pain x1 month, with increased shortness of breath x1 week. In ER patient was noted to be alert and oriented. He was tachypneic and tachycardic. CXR + small pleural effusion and asymmetrical right airspace disease. CTA chest + lung neoplasm with widely spread metastatic disease, hilar and mediastinal lymph and adenopathy hepatic mets and retroperitoneal and mesenteric lymphadenopathy. Patient admitted. Course of hospitalization: Patient has had severe uncontrolled pain resulting in multiple adjustments to pain treatment plan. 7/12 underwent liver biopsy. 7/23 bone scan +Right hilar mass compatible with malignancy, no bone lesions identified. Oncology team Dr. Maci Puckett and Marce Garnica NP following, ordered MRI for staging and treatment recommendations/options. Psychosocial: Originally from [place].  x2,  to current wife x25 years. He has 3 children from previous marriage, current wife also has children from her previous marriage. Multiple grandchildren. PALLIATIVE DIAGNOSES:   1.  Advance care planning discussion  2. Goals of care discussion  3. DNR discussion  4. Pain, cancer related  5. Poor appetite  6. Concern for end of life     PLAN:   1. Prior to visit I completed chart review for updated information. 2. FU visit to assess effectiveness of adjustments made to pain treatment plan. Current regimen includes morphine IR 15 mg orally every 6 hours scheduled, with morphine 2mg IV every 2 hours as needed. Since he started morphine IR he has used prn IV morphine x3. Patient reporting pain better at time of visit, but does get intermittent breakthrough pain that is severe. 3. Patient made decision to be discharged home on Hospice, either later today or tomorrow. Hospice will manage patients pain once home, at this time I do not recommend any further adjustments. 4. Oncology informed patient of squamous cell carcinomas dx, that it is fast growing and aggressive, patient declines treatments and interventions, has decided that he wants to go home with Hospice. Patent had MRI scheduled, he informed me that he had changed his mind, does NOT want MRI, no longer sees the point. 5. Patient elected to have AICD deactivated, "[employer] came to bedside to deactivate  6. DNR discussion- Discussed CPR, showed patient copy of Durable DNR he completed in 2017. We discussed CPR in setting of terminal cancer. Patient indicated that he spoke with attending and agreed to DNR. Has  Durable DNR at home        7. Poor appetite-patient reported appetite has diminished, also noted to have had intentional 19 pound weight loss over the past couple of months. Patient being discharged home with Hospice, no recommendations at this time. 8. Initial consult note routed to primary continuity provider and/or primary health care team members  9.  Communicated plan of care with: Willie Clifford [facility] Team     GOALS OF CARE / TREATMENT PREFERENCES:     GOALS OF CARE:  Patient/Health Care Proxy Stated Goals: Comfort    TREATMENT PREFERENCES:   Code Status: DNR    Advance Care Planning:  [x] [facility] Interdisciplinary Team has updated the ACP Navigator with Health Care Decision Maker and Patient Capacity      Primary Decision Maker: Arianna Schmidt - [phone]  Advance Care Planning 7/23/2020   Patient's Healthcare Decision Maker is: Named in scanned ACP document   Primary Decision Maker Name -   Primary Decision Maker Phone Number -   Primary Decision Maker Relationship to Patient -   Confirm Advance Directive Yes, on file   Patient Would Like to Complete Advance Directive -   Does the patient have other document types -       Medical Interventions: Comfort measures     Other Instructions:   Artificially Administered Nutrition: No feeding tube     Other:    As far as possible, the palliative care team has discussed with patient / health care proxy about goals of care / treatment preferences for patient.      HISTORY:     History obtained from: Medical records/patient    CHIEF COMPLAINT: Denies    HPI/SUBJECTIVE:    The patient is:   [x] Verbal and participatory  [] Non-participatory due to:     Patient reported ready to go home    Clinical Pain Assessment (nonverbal scale for severity on nonverbal patients):   Clinical Pain Assessment  Severity: 0  Location: under right shoulder blade  Character: burning ache  Duration: weeks  Effect: unable to get comfortable, unable to sleep  Factors: movement  Frequency: now intermittent, had been continuous          Duration: for how long has pt been experiencing pain (e.g., 2 days, 1 month, years)  Frequency: how often pain is an issue (e.g., several times per day, once every few days, constant)     FUNCTIONAL ASSESSMENT:     Palliative Performance Scale (PPS):  PPS: 40       PSYCHOSOCIAL/SPIRITUAL SCREENING:     Palliative IDT has assessed this patient for cultural preferences / practices and a referral made as appropriate to needs (Cultural Services, Patient Advocacy, Ethics, etc.)    Any spiritual / Latter day concerns:  [] Yes /  [x] No    Caregiver Burnout:  [] Yes /  [x] No /  [] No Caregiver Present      Anticipatory grief assessment:   [x] Normal  / [] Maladaptive       ESAS Anxiety: Anxiety: 0    ESAS Depression: Depression: 0        REVIEW OF SYSTEMS:     Positive and pertinent negative findings in ROS are noted above in HPI. The following systems were [x] reviewed / [] unable to be reviewed as noted in HPI  Other findings are noted below. Systems: constitutional, ears/nose/mouth/throat, respiratory, gastrointestinal, genitourinary, musculoskeletal, integumentary, neurologic, psychiatric, endocrine. Positive findings noted below. Modified ESAS Completed by: provider   Fatigue: 4 Drowsiness: 0   Depression: 0 Pain: 0   Anxiety: 0 Nausea: 0   Anorexia: 4 Dyspnea: 0           Stool Occurrence(s): 0        PHYSICAL EXAM:     From RN flowsheet:  Wt Readings from Last 3 Encounters:   07/23/20 184 lb 11.9 oz (83.8 kg)   06/23/20 189 lb 6.4 oz (85.9 kg)   06/15/20 191 lb 2.2 oz (86.7 kg)     Blood pressure 110/71, pulse 100, temperature 98.3 °F (36.8 °C), resp. rate 16, height 5' 10\" (1.778 m), weight 184 lb 11.9 oz (83.8 kg), SpO2 (!) 86 %.     Pain Scale 1: Numeric (0 - 10)  Pain Intensity 1: 7  Pain Onset 1: acute  Pain Location 1: Abdomen, Back  Pain Orientation 1: Right  Pain Description 1: Aching  Pain Intervention(s) 1: Medication (see MAR)  Last bowel movement, if known:     Constitutional: Appears older than stated age, awake, alert, pleasant, NAD  Eyes: pupils equal, anicteric  ENMT: no nasal discharge, moist mucous membranes  Cardiovascular: regular rhythm, distal pulses intact  Respiratory: breathing not labored, symmetric  Gastrointestinal: soft non-tender, +bowel sounds  Musculoskeletal: no deformity, no tenderness to palpation  Skin: warm, dry  Neurologic: following commands, moving all extremities  Psychiatric: full affect, no hallucinations  Other:       HISTORY:     Principal Problem:    Shortness of breath (7/22/2020)    Active Problems:    Presence of Watchman left atrial appendage closure device (11/21/2019)      PAD (peripheral artery disease) ([facility] (12/16/2019)      Ischemic cardiomyopathy (1/20/2017)      Overview: A.  Echo (1/19/17):  EF 5-10% with severe GHK,. Mildly dil LA. Mild TR. PASP 46. B. Cath (1/20/17):  LM ost70. LAD m50. D1 80. LCx d70; OM1 99, OM2 90. RCA p100. No AVG. PAP  53/27/36. C.  PCI (2/9/17): pRCA 2.5x38 Xience + 2.75x12 Xience. ostLM 4.0x12       Xience post-dil with 4.5x12 NC. D.  Echo (2/13/17):  EF 10% with severe GHK, PSM. Dil LA. Mod MR. Mild       to mod TR. Left pleural effusion. E.  Echo (5/15/17): EF 25% with severe GHK and basl-mid inf AK, mildly dil       LV, DD. Sev dil LA. Mod MR. Mild TR. PASP 35. F.  ICD (6/12/17, Anup): [employer]. Paroxysmal atrial fibrillation ([address] (3/6/2017)      H/O: GI bleed (3/6/2017)      Dyslipidemia (3/6/2017)      Overview: A. FLP (1/19/17): Tot 120, , HDL 19, LDL 76 (no Rx). Coronary artery disease involving native heart without angina pectoris (8/17/2017)      Peripheral vascular disease ([facility] [address] (9/18/2017)      Overview: A. RICKY (3/20/17): Right 0.58, Left 0.56.       ICD (implantable cardioverter-defibrillator) in place (12/4/2017)      Tobacco dependence syndrome (10/9/2018)      Type 2 diabetes mellitus with diabetic neuropathy ([address] (12/6/2018)      Chronic systolic congestive heart failure, NYHA class 3 ([address] (1/8/2019)      CKD (chronic kidney disease) stage 3, GFR 30-59 ml/min ([facility] [address] (11/26/2019)      Neoplasm of lung (7/22/2020)      Liver metastases ([facility] [address] (7/22/2020)      Past Medical History:   Diagnosis Date    Arthritis     hands/fingers-OSTEO    CAD (coronary artery disease)     involving native coronary artery of native heart without angina pectoris    Cardiomyopathy ([facility] [address] 01/20/2017    A. Echo (1/19/17):  EF 5-10% with severe GHK,. Mildly dil LA. Mild TR. PASP 46./systemic cardiomyopathy    Chronic kidney disease     Chronic obstructive pulmonary disease (HCC)     Chronic renal insufficiency 03/06/2017    Chronic systolic congestive heart failure, NYHA class 3 ([address] 1/8/2019    Claudication ([address] 10/9/2018    Congestive heart failure ([address]     Diabetes mellitus ([address] 3/6/2017    IDDM    Dyslipidemia 3/6/2017    A. FLP (1/19/17): Tot 120, , HDL 19, LDL 76 (no Rx).  H/O: GI bleed 3/6/2017    Hepatitis C 3/6/2017    Hypokalemia     ICD (implantable cardioverter-defibrillator) in place     Ill-defined condition     flu 1/2018     Neuropathy 11/19/2018    Noncompliance with medication regimen 9/11/2018    Paroxysmal atrial fibrillation ([address] 3/6/2017    Peripheral vascular disease ([address] 9/18/2017    A. RICKY (3/20/17): Right 0.58, Left 0.56.  Seizures ([address]     WITH HEPARIN    Tobacco dependence syndrome 10/9/2018    Vitamin D deficiency 6/4/2018    Vitamin deficiency 1/29/2019      Past Surgical History:   Procedure Laterality Date    CARDIAC SURG PROCEDURE UNLIST      CORONARY STENTX 3 2 CORONARY, & 1 FEMORAL    COLONOSCOPY N/A 2/17/2017    COLONOSCOPY performed by Israel Aviles. Gretel Edgar MD at [address] COLONOSCOPY N/A 2/5/2018    COLONOSCOPY performed by Israel Aviles.  Gretel Edgar MD at [address] COLONOSCOPY N/A 10/22/2019    COLONOSCOPY performed by Sherif Washington MD at [facility] ENDOSCOPY    HX CORONARY STENT PLACEMENT      LM, RCA x 2     HX ENDOSCOPY  10/22/2019    [facility]     HX GI      colonoscopy x 3 - last 2/2017 - polyp    HX IMPLANTABLE CARDIOVERTER DEFIBRILLATOR      HX OTHER SURGICAL      HX PACEMAKER      AICD    VASCULAR SURGERY PROCEDURE UNLIST  2017    STENT - RIGHT FEMORAL       Family History   Problem Relation Age of Onset    Hypertension Mother     Heart Disease Mother         MURMUR    Cancer Father         COLON    Colon Cancer Father     Other Sister         born totally handicapped/epileptic    Kidney Disease Sister          from renal shutdown    Heart Disease Brother     Other Brother         ?pancreatic cancer or ?pancreatitis    Cancer Brother         PANCREATIC? AND COLON    Cancer Maternal Uncle         toes and spread    Cancer Paternal Uncle         stomach    Heart Attack Maternal Grandfather 47    Cancer Paternal Grandfather         bone    Cancer Maternal Uncle         stomach    Cancer Maternal Uncle         lung    No Known Problems Son     No Known Problems Son     Anesth Problems Neg Hx       History reviewed, no pertinent family history. Social History     Tobacco Use    Smoking status: Current Every Day Smoker     Packs/day: 1.00     Years: 55.00     Pack years: 55.00    Smokeless tobacco: Never Used    Tobacco comment:     Substance Use Topics    Alcohol use: Yes     Comment: 1 BEER WEEKLY     Allergies   Allergen Reactions    Heparin (Porcine) Other (comments)     Was told when in the hospital that if he received heparin again that it would be deadly.       Current Facility-Administered Medications   Medication Dose Route Frequency    levoFLOXacin (LEVAQUIN) 250 mg in D5W IVPB  250 mg IntraVENous Q24H    torsemide (DEMADEX) tablet 10 mg  10 mg Oral PRN    ipratropium (ATROVENT) 0.02 % nebulizer solution 0.5 mg  0.5 mg Nebulization Q4H PRN    morphine IR (MS IR) tablet 15 mg  15 mg Oral Q6H    promethazine (PHENERGAN) tablet 25 mg  25 mg Oral Q6H PRN    sodium chloride (NS) flush 5-40 mL  5-40 mL IntraVENous Q8H    sodium chloride (NS) flush 5-40 mL  5-40 mL IntraVENous PRN    polyethylene glycol (MIRALAX) packet 17 g  17 g Oral DAILY PRN    nicotine (NICODERM CQ) 21 mg/24 hr patch 1 Patch  1 Patch TransDERmal DAILY    fondaparinux (ARIXTRA) injection 2.5 mg  2.5 mg SubCUTAneous Q24H    sacubitriL-valsartan (ENTRESTO) 49-51 mg tablet 1 Tab  1 Tab Oral BID    metoprolol succinate (TOPROL-XL) XL tablet 25 mg  25 mg Oral DAILY    potassium chloride SR (KLOR-CON 10) tablet 20 mEq  20 mEq Oral BID    atorvastatin (LIPITOR) tablet 40 mg  40 mg Oral DAILY    ergocalciferol capsule 50,000 Units  50,000 Units Oral Q7D    glucose chewable tablet 16 g  4 Tab Oral PRN    dextrose (D50W) injection syrg 12.5-25 g  25-50 mL IntraVENous PRN    glucagon (GLUCAGEN) injection 1 mg  1 mg IntraMUSCular PRN    insulin glargine (LANTUS) injection 32 Units  0.4 Units/kg SubCUTAneous QHS    insulin lispro (HUMALOG) injection   SubCUTAneous Q6H    morphine injection 2 mg  2 mg IntraVENous Q2H PRN          LAB AND IMAGING FINDINGS:     Lab Results   Component Value Date/Time    WBC 7.9 07/24/2020 05:51 AM    HGB 13.6 07/24/2020 05:51 AM    PLATELET 585 11/11/4103 05:51 AM     Lab Results   Component Value Date/Time    Sodium 122 (L) 07/24/2020 05:51 AM    Potassium 4.3 07/24/2020 05:51 AM    Chloride 89 (L) 07/24/2020 05:51 AM    CO2 25 07/24/2020 05:51 AM    BUN 27 (H) 07/24/2020 05:51 AM    Creatinine 2.45 (H) 07/24/2020 05:51 AM    Calcium 8.9 07/24/2020 05:51 AM    Magnesium 1.8 06/10/2020 12:00 AM    Phosphorus 2.5 (L) 01/23/2017 12:09 PM      Lab Results   Component Value Date/Time    Alk.  phosphatase 141 (H) 07/22/2020 09:57 AM    Protein, total 7.8 07/22/2020 09:57 AM    Albumin 3.2 (L) 07/22/2020 09:57 AM    Globulin 4.6 (H) 07/22/2020 09:57 AM     Lab Results   Component Value Date/Time    INR 1.3 (H) 02/22/2017 03:16 AM    Prothrombin time 12.9 (H) 02/22/2017 03:16 AM    aPTT 28.2 02/15/2017 04:24 AM      Lab Results   Component Value Date/Time    Iron 69 04/29/2019 11:56 AM    TIBC 246 (L) 04/29/2019 11:56 AM    Iron % saturation 28 04/29/2019 11:56 AM      No results found for: PH, PCO2, PO2  No components found for: [place]   Lab Results   Component Value Date/Time    CK 51 01/18/2017 11:11 PM    CK - MB 2.9 01/18/2017 11:11 PM                Total time: 35 min  Counseling / coordination time, spent as noted above: 25 min  > 50% counseling / coordination?: yes    Prolonged service was provided for  []30 min   []75 min in face to face time in the presence of the patient, spent as noted above. Time Start:   Time End:   Note: this can only be billed with 15544 (initial) or 43328 (follow up). If multiple start / stop times, list each separately.

## 2020-07-27 LAB
BACTERIA SPEC CULT: NORMAL
BACTERIA SPEC CULT: NORMAL
GRAM STN SPEC: NORMAL
GRAM STN SPEC: NORMAL
SERVICE CMNT-IMP: NORMAL
SERVICE CMNT-IMP: NORMAL

## 2022-02-05 NOTE — TELEPHONE ENCOUNTER
Left message for patient to return call on home and cell phones regarding lab results:  Good per Ron Thornton, recheck in one month, placed on lab list.    Patient returned call, I reviewed his lab results, he states understanding and has no questions.
36.7

## 2022-04-21 NOTE — PROGRESS NOTES
Pre-discharge follow up visit with patient. Reflected on the experience of this hospital stay and his spiritual growth while hospitalized. Provided spoken prayer.     [facility]  [phone]) Opioid Pregnancy And Lactation Text: These medications can lead to premature delivery and should be avoided during pregnancy. These medications are also present in breast milk in small amounts.

## 2023-01-12 NOTE — MR AVS SNAPSHOT
Visit Information Date & Time Provider Department Dept. Phone Encounter #  
 9/21/2017  9:00 AM Devan Yadav MD [address] [id number] Follow-up Instructions Return in about 2 weeks (around 10/5/2017) for heart failure follow up. Your Appointments 12/4/2017  9:30 AM  
ESTABLISHED PATIENT with Demario Bauman MD  
[address] [facility] [address]) Appt Note: 4mo fuv for labs [address] [address] [address]  
[phone]  
  
   
 [address] [address] [address] [address]  
  
    
 7/25/2018 10:00 AM  
ESTABLISHED PATIENT with Emelina Preston MD  
[facility] ([address]) Appt Note: annual  
 [address] [place] [address] [address] [facility]  
[address] [facility] [place] [address] [address] Upcoming Health Maintenance Date Due  
 FOOT EXAM Q1 11/23/1962 MICROALBUMIN Q1 11/23/1962 EYE EXAM RETINAL OR DILATED Q1 11/23/1962 Pneumococcal 19-64 Medium Risk (1 of 1 - PPSV23) 11/23/1971 DTaP/Tdap/Td series (1 - Tdap) 11/23/1973 ZOSTER VACCINE AGE 60> 9/23/2012 INFLUENZA AGE 9 TO ADULT 8/1/2017 LIPID PANEL Q1 1/19/2018 FOBT Q 1 YEAR AGE 50-75 2/16/2018 HEMOGLOBIN A1C Q6M 2/17/2018 Allergies as of 9/21/2017  Review Complete On: 9/21/2017 By: Sheril Barthel Severity Noted Reaction Type Reactions Heparin (Porcine)  02/07/2017    Unknown (comments) Current Immunizations  Reviewed on 2/14/2017 No immunizations on file. Not reviewed this visit You Were Diagnosed With   
  
 Codes Comments Ischemic cardiomyopathy    -  Primary ICD-10-CM: I25.5 ICD-9-CM: 414.8 Systolic CHF, chronic (HCC)     ICD-10-CM: I50.22 ICD-9-CM: 428.22, 428.0 Peripheral vascular disease ([facility] [address]     ICD-10-CM: I73.9 ICD-9-CM: 443. 9 Chronic renal insufficiency, unspecified stage     ICD-10-CM: N18.9 ICD-9-CM: 877. 9 Vitals BP Pulse Temp Resp Height(growth percentile) Weight(growth percentile) 128/64 (BP 1 Location: Right arm, BP Patient Position: Sitting) 75 97.8 °F (36.6 °C) (Oral) 20 5' 10\" (1.778 m) 179 lb 9.6 oz (81.5 kg) SpO2 BMI Smoking Status 91% 25.77 kg/m2 Former Smoker Vitals History BMI and BSA Data Body Mass Index Body Surface Area 25.77 kg/m 2 2.01 m 2 Preferred Pharmacy Pharmacy Name Phone [facility] [place] [phone] Your Updated Medication List  
  
   
This list is accurate as of: 9/21/17  9:25 AM.  Always use your most recent med list.  
  
  
  
  
 amiodarone 200 mg tablet Commonly known as:  CORDARONE  
TAKE ONE TABLET BY MOUTH EVERY 12 HOURS  
  
 aspirin delayed-release 81 mg tablet Take 1 Tab by mouth daily. atorvastatin 10 mg tablet Commonly known as:  LIPITOR  
TAKE ONE TABLET BY MOUTH ONCE DAILY  
  
 bumetanide 1 mg tablet Commonly known as:  Pamalee Garter Take 1 mg by mouth as needed. carvedilol 3.125 mg tablet Commonly known as:  COREG  
TAKE ONE TABLET BY MOUTH TWICE DAILY WITH MEALS  
  
 clopidogrel 75 mg Tab Commonly known as:  PLAVIX Take 1 Tab by mouth daily. DULoxetine 30 mg capsule Commonly known as:  CYMBALTA Take 1 Cap by mouth daily. insulin NPH/insulin regular 100 unit/mL (70-30) injection Commonly known as:  NOVOLIN 70/30, HUMULIN 70/30  
10 Units by SubCUTAneous route two (2) times a day. Take 10 units in the morning and 8 units in the evening. Insulin Syringes (Disposable) 1 mL Syrg Pt to take 10 units w/ breakfast and 8 units w/ dinner  
  
 milrinone 20 mg/100 mL (200 mcg/mL) infusion Commonly known as:  PRIMACOR  
15.16 mcg/min by IntraVENous route continuous. NORMAL SALINE FLUSH 0.9 % Generic drug:  sodium chloride 5-10 mL by IntraVENous Push route as needed. patiromer calcium sorbitex 8.4 gram powder Commonly known as:  VELTASSA Take 8.4 g by mouth daily. sacubitril-valsartan 49-51 mg Tab tablet Commonly known as:  ENTRESTO Take 1 Tab by mouth two (2) times a day. spironolactone 25 mg tablet Commonly known as:  ALDACTONE Take 1 Tab by mouth daily. TYLENOL EXTRA STRENGTH 500 mg tablet Generic drug:  acetaminophen Take 1,000 mg by mouth every six (6) hours as needed for Pain. Indications: BACK PAIN We Performed the Following METABOLIC PANEL, BASIC [71670 CPT(R)] Follow-up Instructions Return in about 2 weeks (around 10/5/2017) for heart failure follow up. To-Do List   
 09/21/2017 12:00 PM  
  Appointment with Griselda Sellar, RN at [place] [address]  
  
 09/25/2017 To Be Determined Appointment with Griselda Sellar, RN at [place] [address]  
  
 10/02/2017 To Be Determined Appointment with Griselda Sellar, RN at [place] [address]  
  
 10/09/2017 To Be Determined Appointment with Griselda Sellar, RN at [place] [address]  
  
 10/16/2017 To Be Determined Appointment with Griselda Sellar, RN at [place] [address] Patient Instructions We will follow up on your lab results and call you when they are in. If your kidney function is stable, we will decrease the dose of your milrinone. Continue to weigh yourself every day and watch your salt and fluid intake. Call us if you gain more than 2 lbs in a day or 5 lbs in a week, or have any worsening shortness of breath or swelling. Learning About Heart Failure Zones What are heart failure zones? Heart failure zones give you an easy way to see changes in your heart failure symptoms. They also tell you when you need to get help. Check every day to see which zone you are in. Green zone. You are doing well. This is where you want to be. · Your weight is stable. This means it is not going up or down. · You breathe easily. · You are sleeping well. You are able to lie flat without shortness of breath. · You can do your usual activities. Yellow zone. Be careful. Your symptoms are changing. Call your doctor. · You have new or increased shortness of breath. · You are dizzy or lightheaded, or you feel like you may faint. · You have sudden weight gain, such as more than 2 to 3 pounds in a day or 5 pounds in a week. (Your doctor may suggest a different range of weight gain.) · You have increased swelling in your legs, ankles, or feet. · You are so tired or weak that you cannot do your usual activities. · You are not sleeping well. Shortness of breath wakes you up at night. You need extra pillows. Your doctor's name: ____________________________________________________________ Your doctor's contact information: _________________________________________________ Red zone. This is an emergency. Call 911. You have symptoms of sudden heart failure, such as: 
· You have severe trouble breathing. · You cough up pink, foamy mucus. · You have a new irregular or fast heartbeat. You have symptoms of a heart attack. These may include: · Chest pain or pressure, or a strange feeling in the chest. 
· Sweating. · Shortness of breath. · Nausea or vomiting. · Pain, pressure, or a strange feeling in the back, neck, jaw, or upper belly or in one or both shoulders or arms. · Lightheadedness or sudden weakness. · A fast or irregular heartbeat. If you have symptoms of a heart attack: After you call 911, the  may tell you to chew 1 adult-strength or 2 to 4 low-dose aspirin. Wait for an ambulance. Do not try to drive yourself. Follow-up care is a key part of your treatment and safety.  Be sure to make and go to all appointments, and call your doctor if you are having problems. It's also a good idea to know your test results and keep a list of the medicines you take. Where can you learn more? Go to http://erik-dianna.info/. Enter T174 in the search box to learn more about \"Learning About Heart Failure Zones. \" Current as of: February 23, 2017 Content Version: 11.3 © 2476-2522 [employer]. Care instructions adapted under license by [employer] (which disclaims liability or warranty for this information). If you have questions about a medical condition or this instruction, always ask your healthcare professional. [place] [address] any warranty or liability for your use of this information. Peripheral Arterial Disease of the Leg: Care Instructions Your Care Instructions Peripheral arterial disease (PAD) occurs when the blood vessels (arteries) that supply blood to the legs, belly, pelvis, arms, or neck get too narrow. This reduces blood flow to that area. The legs are affected most often. Fatty buildup (plaque) in the arteries usually is the cause of PAD. This buildup is also called \"hardening\" of the arteries. Your risk of PAD increases if you smoke or have high cholesterol, high blood pressure, diabetes, or a family history of PAD. One of the main symptoms of PAD is intermittent claudication. This is a tight, aching, or squeezing pain in the calf, foot, thigh, or buttock that occurs during exercise. The pain usually gets worse during exercise and goes away when you rest. But as PAD gets worse, you may feel pain even at rest. 
Medicines and lifestyle changes may help your symptoms and lower your risk of heart attack and stroke. In some cases, surgery or other treatment is needed. It is important that you follow up with your doctor. Follow-up care is a key part of your treatment and safety.  Be sure to make and go to all appointments, and call your doctor if you are having problems. It's also a good idea to know your test results and keep a list of the medicines you take. How can you care for yourself at home? · Do not smoke. Smoking can make PAD worse. If you need help quitting, talk to your doctor about stop-smoking programs and medicines. These can increase your chances of quitting for good. · Take your medicines exactly as prescribed. Call your doctor if you think you are having a problem with your medicine. · If you take a blood thinner, such as aspirin, be sure to get instructions about how to take your medicine safely. Blood thinners can cause serious bleeding problems. · Ask your doctor if a cardiac rehab program is right for you. Cardiac rehab can help you make lifestyle changes. In cardiac rehab, a team of health professionals provides education and support to help you make new, healthy habits. · Eat heart-healthy foods such as fruits, vegetables, whole grains, fish, lean meats, and low-fat or nonfat dairy foods. Limit sodium, sugar, and alcohol. · If your doctor recommends it, get more exercise. Walking is a good choice. Bit by bit, increase the amount you walk every day. Try for at least 30 minutes on most days of the week. · Stay at a healthy weight. Lose weight if you need to. · Take good care of your feet. ¨ Treat cuts and scrapes on your legs right away. Poor blood flow prevents (or slows) quick healing of even small cuts or scrapes. This is even more important if you have diabetes. ¨ Avoid shoes that are too tight or that rub your feet. Shoes should be comfortable and fit well. ¨ Avoid socks or stockings that are tight enough to leave elastic-band marks on your legs. Tight socks can make circulation problems worse. ¨ Keep your feet clean and moisturized to prevent drying and cracking. Place cotton or lamb's wool between your toes to prevent rubbing and to absorb moisture.  
¨ If you have a sore on your leg or foot, keep it dry and cover it with a nonstick bandage until you see your doctor. When should you call for help? Call 911 anytime you think you may need emergency care. For example, call if: 
· You have symptoms of a heart attack. These may include: ¨ Chest pain or pressure, or a strange feeling in the chest. 
¨ Sweating. ¨ Shortness of breath. ¨ Nausea or vomiting. ¨ Pain, pressure, or a strange feeling in the back, neck, jaw, or upper belly or in one or both shoulders or arms. ¨ Lightheadedness or sudden weakness. ¨ A fast or irregular heartbeat. After you call 911, the  may tell you to chew 1 adult-strength or 2 to 4 low-dose aspirin. Wait for an ambulance. Do not try to drive yourself. · You have sudden, severe leg pain, and your leg is cool and pale. · You have symptoms of a stroke. These may include: 
¨ Sudden numbness, tingling, weakness, or loss of movement in your face, arm, or leg, especially on only one side of your body. ¨ Sudden vision changes. ¨ Sudden trouble speaking. ¨ Sudden confusion or trouble understanding simple statements. ¨ Sudden problems with walking or balance. ¨ A sudden, severe headache that is different from past headaches. Call your doctor now or seek immediate medical care if: 
· You have leg pain that does not go away even if you rest. 
· Your leg pain changes or gets worse. For example, if you have more pain with normal activity or the same pain with decreased activity, you should call. · You have an open sore on your leg or foot that is infected. Signs of infection include: 
¨ Increased pain, swelling, warmth, or redness. ¨ Red streaks leading from the sore. ¨ Pus draining from the sore. ¨ A fever. Watch closely for changes in your health, and be sure to contact your doctor if you have any problems. Where can you learn more? Go to http://erik-dianna.info/. Enter [phone] [id number] in the search box to learn more about \"Peripheral Arterial Disease of the Leg: Care Instructions. \" 
 Current as of: July 19, 2016 Content Version: 11.3 © 0888-4911 [employer]. Care instructions adapted under license by [email or web address] (which disclaims liability or warranty for this information). If you have questions about a medical condition or this instruction, always ask your healthcare professional. [address] any warranty or liability for your use of this information. Introducing [facility]! Shweta Shah introduces [employer]Center patient portal. Now you can access parts of your medical record, email your doctor's office, and request medication refills online. 1. In your internet browser, go to [email or web address] 2. Click on the First Time User? Click Here link in the Sign In box. You will see the New Member Sign Up page. 3. Enter your VeriCenter Access Code exactly as it appears below. You will not need to use this code after youve completed the sign-up process. If you do not sign up before the expiration date, you must request a new code. · VeriCenter Access Code: [email or web address] Expires: 10/22/2017 12:10 PM 
 
4. Enter the last four digits of your Social Security Number (xxxx) and Date of Birth (mm/dd/yyyy) as indicated and click Submit. You will be taken to the next sign-up page. 5. Create a VeriCenter ID. This will be your VeriCenter login ID and cannot be changed, so think of one that is secure and easy to remember. 6. Create a Ver[employer]Center password. You can change your password at any time. 7. Enter your Password Reset Question and Answer. This can be used at a later time if you forget your password. 8. Enter your e-mail address. You will receive e-mail notification when new information is available in [address] 9. Click Sign Up. You can now view and download portions of your medical record. 10. Click the Download Summary menu link to download a portable copy of your medical information. If you have questions, please visit the Frequently Asked Questions section of the [email or web address] website. Remember, [employer] is NOT to be used for urgent needs. For medical emergencies, dial 911. Now available from your iPhone and Android! Please provide this summary of care documentation to your next provider. Your primary care clinician is listed as MICHAEL BALLARD. If you have any questions after today's visit, please call [phone]. Detail Level: Zone Continue Regimen: Isotretinoin 100mg daily. Consider d/c next month

## 2023-12-11 NOTE — PROGRESS NOTES
Problem: Falls - Risk of  Goal: *Absence of falls  Nonskid socks, fall band, belongings and call bell within reach     Problem: Pressure Ulcer - Risk of  Goal: *Prevention of pressure ulcer  Outcome: Progressing Towards Goal  Turning q2h, monitoring glucose ACHS, on TCS bed, OOB to chair    Problem: Heart Failure: Day 4  Goal: Activity/Safety  Outcome: Progressing Towards Goal  Lines removed yesterday remains on milrinone   Goal: Nutrition/Diet  Outcome: Progressing Towards Goal  Tolerating diet without nausea or vomiting Not applicable

## 2025-03-28 NOTE — PROGRESS NOTES
1240- Patient arrived to unit. Assessment performed. Patient has impella in left groin and MAC/ [place] in right groin. Patient aware he needs to keep bilateral legs straight. Impella rep Nancy at bedside. Placement signal lost d/t minor clotting. Nancy aspirated and flushed site- will continue to assess. 1310- ECHO at bedside. Nancy at bedside to assess placement- Impella in good position. Paged Dr. Pablo Hartley to clarify Angiomax order. 26- Dr. Fanny Arriaga returned page- wants angio changed to 1mg/kg/hr- discussed that order is not the normal titration/ ptt order- wants the normal impella protocol followed- Jerrel Goodell paged- spoke to via telephone- will order Impella order set. Primary Nurse Eric Johnson RN and Mallika Shaw RN performed a dual skin assessment on this patient Impairment noted- see wound doc flow sheet  Trey score is 18    1500- Dr. Kathy Singer at bedside. Discussed dye received intra-op. MD wants to continue bumex as is and am labs. 1545-Dr. Hernández at bedside- stating SVR is in the 1800s- verbal order to increase milrinone to 0.375- performed by RN. PTT 86.1. Discussed with Pharmacy that rate was decreased to 1mg at 1415- will redraw PTT at 1615    1800- CI consistenly greater than 2.5- P level decreased to 6. Will continue to monitor    0345- Stiven Funk at bedside. Discussed P level, PTT and labs. Leave at P6 overnight. 2000- Bedside and Verbal shift change report given to Doctor Kimmie Zendejas (oncoming nurse) by Abrahan Vivas RN (offgoing nurse). Report given with SBAR, Kardex, Intake/Output and MAR. Hemostasis: Drysol

## (undated) DEVICE — NEEDLE HYPO 18GA L1.5IN PNK S STL HUB POLYPR SHLD REG BVL

## (undated) DEVICE — SOLUTION IV 1000ML 0.9% SOD CHL

## (undated) DEVICE — Z DISCONTINUED NO SUB IDED SET EXTN W/ 4 W STPCOCK M SPIN LOK 36IN

## (undated) DEVICE — HI-TORQUE STEELCORE 18 PERIPHERAL GUIDEWIRE 300 CM: Brand: HI-TORQUE STEELCORE

## (undated) DEVICE — REM POLYHESIVE ADULT PATIENT RETURN ELECTRODE: Brand: VALLEYLAB

## (undated) DEVICE — KIT MFLD ISOLATN NACL CNTRST PRT TBNG SPIK W/ PRSS TRNSDUC

## (undated) DEVICE — SET EXTN TBNG L BOR 4 W STPCOCK ST 32IN PRIMING VOL 6ML

## (undated) DEVICE — 1200 GUARD II KIT W/5MM TUBE W/O VAC TUBE: Brand: GUARDIAN

## (undated) DEVICE — SOLIDIFIER FLUID 3000 CC ABSORB

## (undated) DEVICE — Z INACTIVE NO USAGE TURNOVER KIT RM CLEANOP

## (undated) DEVICE — SUTURE MCRYL SZ 3-0 L27IN ABSRB UD L24MM PS-1 3/8 CIR PRIM Y936H

## (undated) DEVICE — TRAY PREP DRY W/ PREM GLV 2 APPL 6 SPNG 2 UNDPD 1 OVERWRAP

## (undated) DEVICE — SURGICAL PROCEDURE PACK BASIN MAJ SET CUST NO CAUT

## (undated) DEVICE — RADIFOCUS GLIDEWIRE ADVANTAGE GUIDEWIRE: Brand: GLIDEWIRE ADVANTAGE

## (undated) DEVICE — PRESSURE MONITORING SET: Brand: TRUWAVE

## (undated) DEVICE — MASK O2 O2 LN L7FT CO2 LN L10FT FEM BG 750ML M CONC ADPT

## (undated) DEVICE — DESTINATION RENAL GUIDING SHEATH: Brand: DESTINATION

## (undated) DEVICE — QUILTED PREMIUM COMFORT UNDERPAD,EXTRA HEAVY: Brand: WINGS

## (undated) DEVICE — BLADE ASSEMB CLP HAIR FINE --

## (undated) DEVICE — CONNECTOR TBNG AUX H2O JET DISP FOR OLY 160/180 SER

## (undated) DEVICE — FORCEPS BX L240CM JAW DIA2.8MM L CAP W/ NDL MIC MESH TOOTH

## (undated) DEVICE — KIT IV STRT W CHLORAPREP PD 1ML

## (undated) DEVICE — WORKING LENGTH 235CM, WORKING CHANNEL 2.8MM: Brand: RESOLUTION 360 CLIP

## (undated) DEVICE — NEONATAL-ADULT SPO2 SENSOR: Brand: NELLCOR

## (undated) DEVICE — GAUZE SPONGES,12 PLY: Brand: CURITY

## (undated) DEVICE — CATHETER GUID TURNPIKE 135CM DIA2.9X2.9X1.6FR 0.014IN

## (undated) DEVICE — TORFLEX TRANSSEPTAL SHEATH; TRANSSEPTAL DILATOR; J-TIP GUIDEWIRE: Brand: TORFLEX TRANSSEPTAL GUIDING SHEATH

## (undated) DEVICE — Device: Brand: PADPRO

## (undated) DEVICE — Device

## (undated) DEVICE — SNARE ENDOSCP M L240CM W27MM SHTH DIA2.4MM CHN 2.8MM OVL

## (undated) DEVICE — ROCKER SWITCH PENCIL BLADE ELECTRODE, HOLSTER: Brand: EDGE

## (undated) DEVICE — BAG BELONG PT PERS CLEAR HANDL

## (undated) DEVICE — 40418 TRENDELENBURG ONE-STEP ARM PROTECTORS LARGE (1 PAIR): Brand: 40418 TRENDELENBURG ONE-STEP ARM PROTECTORS LARGE (1 PAIR)

## (undated) DEVICE — SYR 50ML SLIP TIP NSAF LF STRL --

## (undated) DEVICE — KIT HND CTRL 3 W STPCOCK ROT END 54IN PREM HI PRSS TBNG AT

## (undated) DEVICE — INTENDED FOR TISSUE SEPARATION, AND OTHER PROCEDURES THAT REQUIRE A SHARP SURGICAL BLADE TO PUNCTURE OR CUT.: Brand: BARD-PARKER ® CARBON RIB-BACK BLADES

## (undated) DEVICE — STERILE POLYISOPRENE POWDER-FREE SURGICAL GLOVES: Brand: PROTEXIS

## (undated) DEVICE — RADIFOCUS GLIDEWIRE: Brand: GLIDEWIRE

## (undated) DEVICE — CATHETERIZATION KIT 20 GAX1.75 IN RADIAL ARTERY

## (undated) DEVICE — NEEDLE HYPO 25GA L1.5IN BVL ORIENTED ECLIPSE

## (undated) DEVICE — CATH DIAG IMPLS PIG 6FRX100CM -- IMPULSE 16599-40

## (undated) DEVICE — Device: Brand: RFP-100A CONNECTOR CABLE

## (undated) DEVICE — SET ADMIN 16ML TBNG L100IN 2 Y INJ SITE IV PIGGY BK DISP

## (undated) DEVICE — CATH SUPP SNGL 0.035INX135CM -- TRAILBLAZER - ORDER BY PK/5

## (undated) DEVICE — VALVE ANGIO ID0.11IN HEMSTAT MTL GUID WIRE INSRT TOOL AND

## (undated) DEVICE — KENDALL RADIOLUCENT FOAM MONITORING ELECTRODE -RECTANGULAR SHAPE: Brand: KENDALL

## (undated) DEVICE — ANGIOGRAPHIC CATHETER: Brand: IMPULSE™

## (undated) DEVICE — ABDOMINAL PAD: Brand: DERMACEA

## (undated) DEVICE — ROCKER SWITCH PENCIL HOLSTER: Brand: VALLEYLAB

## (undated) DEVICE — Device: Brand: PROTRACK PIGTAIL WIRE

## (undated) DEVICE — CANN NASAL O2 CAPNOGRAPHY AD -- FILTERLINE

## (undated) DEVICE — TRAP SURG QUAD PARABOLA SLOT DSGN SFTY SCRN TRAPEASE

## (undated) DEVICE — TUBING HYDR IRR --

## (undated) DEVICE — 3M™ TEGADERM™ TRANSPARENT FILM DRESSING FRAME STYLE, 1626W, 4 IN X 4-3/4 IN (10 CM X 12 CM), 50/CT 4CT/CASE: Brand: 3M™ TEGADERM™

## (undated) DEVICE — PACK PROCEDURE SURG HRT CATH

## (undated) DEVICE — DRAPE PRB US TRNSDCR 6X96IN --

## (undated) DEVICE — Device: Brand: NRG TRANSSEPTAL NEEDLE

## (undated) DEVICE — ANGIOGRAPHY KIT

## (undated) DEVICE — PROCEDURE KIT FLUID MGMT CUST MAINFOLD STRL

## (undated) DEVICE — ENDO CARRY-ON PROCEDURE KIT INCLUDES ENZYMATIC SPONGE, GAUZE, BIOHAZARD LABEL, TRAY, LUBRICANT, DIRTY SCOPE LABEL, WATER LABEL, TRAY, DRAWSTRING PAD, AND DEFENDO 4-PIECE KIT.: Brand: ENDO CARRY-ON PROCEDURE KIT

## (undated) DEVICE — DBD-PACK,LAPAROTOMY,2 REINFORCED GOWNS: Brand: MEDLINE

## (undated) DEVICE — AIRLIFE™ U/CONNECT-IT OXYGEN TUBING 7 FEET (2.1 M) CRUSH-RESISTANT OXYGEN TUBING, VINYL TIPPED: Brand: AIRLIFE™

## (undated) DEVICE — .035 TORQUE DEVICE: Brand: TORQUE DEVICE

## (undated) DEVICE — SUTURE PROL SZ 6-0 L30IN NONABSORBABLE BLU L13MM RB-2 1/2 8711H

## (undated) DEVICE — TOWEL SURG W17XL27IN STD BLU COT NONFENESTRATED PREWASHED

## (undated) DEVICE — SUTURE PROL SZ 5-0 L30IN NONABSORBABLE BLU L13MM RB-2 1/2 8710H

## (undated) DEVICE — CATH IV AUTOGRD BC BLU 22GA 25 -- INSYTE

## (undated) DEVICE — WRISTBAND ID AD W1XL11.5IN RED POLY ALRG PREPRINTED PERM

## (undated) DEVICE — CHECK-FLO PERFORMER INTRODUCER: Brand: PERFORMER

## (undated) DEVICE — BAG SPEC BIOHZD LF 2MIL 6X10IN -- CONVERT TO ITEM 357326

## (undated) DEVICE — 3M™ DURAPORE™ SURGICAL TAPE 1538-3, 3 INCH X 10 YARD (7,5CM X 9,1M), 4 ROLLS/BOX: Brand: 3M™ DURAPORE™

## (undated) DEVICE — ACCESS SHEATH WITH DILATOR: Brand: WATCHMAN® ACCESS SYSTEM

## (undated) DEVICE — Z DISCONTINUED USE 2751540 TUBING IRRIG L10IN DISP PMP ENDOGATOR

## (undated) DEVICE — CATHETER ANGIO AD 5FR L65CM DIA0.049IN GWIRE 0.035IN SHEP

## (undated) DEVICE — MEDI-VAC NON-CONDUCTIVE SUCTION TUBING: Brand: CARDINAL HEALTH

## (undated) DEVICE — RADIFOCUS GLIDECATH: Brand: GLIDECATH

## (undated) DEVICE — CONTAINER SPEC 20 ML LID NEUT BUFF FORMALIN 10 % POLYPR STS

## (undated) DEVICE — BW-412T DISP COMBO CLEANING BRUSH: Brand: SINGLE USE COMBINATION CLEANING BRUSH

## (undated) DEVICE — SUTURE VCRL SZ 3-0 L27IN ABSRB UD L26MM SH 1/2 CIR J416H

## (undated) DEVICE — KIT MED IMAG CNTRST AGNT W/ IOPAMIDOL REUSE

## (undated) DEVICE — Device: Brand: MIRACLEBROS 12

## (undated) DEVICE — PERCLOSE PROGLIDE™ SUTURE-MEDIATED CLOSURE SYSTEM: Brand: PERCLOSE PROGLIDE™

## (undated) DEVICE — STERILE POLYISOPRENE POWDER-FREE SURGICAL GLOVES WITH EMOLLIENT COATING: Brand: PROTEXIS

## (undated) DEVICE — AGENT HEMSTAT W4XL4IN OXIDIZED REGENERATED CELOS ABSRB SFT

## (undated) DEVICE — PINNACLE INTRODUCER SHEATH: Brand: PINNACLE

## (undated) DEVICE — INTENDED TO STANDARDIZE OR CAMERAS TO ALLOW FOR THE USE OF THE OR CAMERA COVER: Brand: ASPEN® O.R. CAMERA COVER

## (undated) DEVICE — DEVON™ KNEE AND BODY STRAP 60" X 3" (1.5 M X 7.6 CM): Brand: DEVON

## (undated) DEVICE — GOWN,SIRUS,NONRNF,SETINSLV,XL,20/CS: Brand: MEDLINE

## (undated) DEVICE — CAPSULE ENDOSCP L26.2MM DIA11.4MM BIOCOMPATIBLE PLAS SB 3

## (undated) DEVICE — LIGHT HANDLE: Brand: DEVON

## (undated) DEVICE — SYRINGE MED 20ML STD CLR PLAS LUERLOCK TIP N CTRL DISP

## (undated) DEVICE — SYR 50ML LR LCK 1ML GRAD NSAF --

## (undated) DEVICE — SNARE ENDOSCP L240CM LOOP W27MM SHTH DIA2.4MM WRK CHN 2.8MM

## (undated) DEVICE — SUTURE VCRL SZ 0 L18IN ABSRB VLT L40MM CT 1/2 CIR J752D

## (undated) DEVICE — SPONGE LAP 18X18IN STRL -- 5/PK

## (undated) DEVICE — FORCEPS SURG L5IN S STL STR SERR HEMSTAT MOSQ

## (undated) DEVICE — INFECTION CONTROL KIT SYS

## (undated) DEVICE — (D)PREP SKN CHLRAPRP APPL 26ML -- CONVERT TO ITEM 371833

## (undated) DEVICE — SYR 10ML LUER LOK 1/5ML GRAD --

## (undated) DEVICE — BAG RED 3PLY 2MIL 30X40 IN

## (undated) DEVICE — TOWEL,OR,DSP,ST,BLUE,STD,2/PK,40PK/CS: Brand: MEDLINE